# Patient Record
Sex: MALE | Race: OTHER | HISPANIC OR LATINO
[De-identification: names, ages, dates, MRNs, and addresses within clinical notes are randomized per-mention and may not be internally consistent; named-entity substitution may affect disease eponyms.]

---

## 2021-03-24 ENCOUNTER — APPOINTMENT (OUTPATIENT)
Dept: SURGERY | Facility: CLINIC | Age: 56
End: 2021-03-24
Payer: MEDICAID

## 2021-03-24 VITALS
SYSTOLIC BLOOD PRESSURE: 153 MMHG | HEART RATE: 85 BPM | HEIGHT: 71 IN | WEIGHT: 208 LBS | TEMPERATURE: 97.8 F | OXYGEN SATURATION: 97 % | DIASTOLIC BLOOD PRESSURE: 117 MMHG | BODY MASS INDEX: 29.12 KG/M2

## 2021-03-24 PROCEDURE — 99203 OFFICE O/P NEW LOW 30 MIN: CPT

## 2021-03-24 PROCEDURE — 99072 ADDL SUPL MATRL&STAF TM PHE: CPT

## 2021-03-24 RX ORDER — BICTEGRAVIR SODIUM, EMTRICITABINE, AND TENOFOVIR ALAFENAMIDE FUMARATE 30; 120; 15 MG/1; MG/1; MG/1
TABLET ORAL
Refills: 0 | Status: ACTIVE | COMMUNITY

## 2021-03-30 ENCOUNTER — OUTPATIENT (OUTPATIENT)
Dept: OUTPATIENT SERVICES | Facility: HOSPITAL | Age: 56
LOS: 1 days | Discharge: HOME | End: 2021-03-30
Payer: MEDICAID

## 2021-03-30 VITALS
RESPIRATION RATE: 18 BRPM | DIASTOLIC BLOOD PRESSURE: 100 MMHG | OXYGEN SATURATION: 96 % | HEART RATE: 81 BPM | HEIGHT: 71.5 IN | SYSTOLIC BLOOD PRESSURE: 140 MMHG | TEMPERATURE: 98 F | WEIGHT: 248.46 LBS

## 2021-03-30 DIAGNOSIS — E78.5 HYPERLIPIDEMIA, UNSPECIFIED: ICD-10-CM

## 2021-03-30 DIAGNOSIS — Z01.818 ENCOUNTER FOR OTHER PREPROCEDURAL EXAMINATION: ICD-10-CM

## 2021-03-30 DIAGNOSIS — K64.8 OTHER HEMORRHOIDS: ICD-10-CM

## 2021-03-30 DIAGNOSIS — I10 ESSENTIAL (PRIMARY) HYPERTENSION: ICD-10-CM

## 2021-03-30 DIAGNOSIS — E11.9 TYPE 2 DIABETES MELLITUS WITHOUT COMPLICATIONS: ICD-10-CM

## 2021-03-30 DIAGNOSIS — K64.9 UNSPECIFIED HEMORRHOIDS: ICD-10-CM

## 2021-03-30 LAB
ANION GAP SERPL CALC-SCNC: 5 MMOL/L — SIGNIFICANT CHANGE UP (ref 5–17)
APTT BLD: 32.5 SEC — SIGNIFICANT CHANGE UP (ref 27.5–35.5)
BASOPHILS # BLD AUTO: 0.04 K/UL — SIGNIFICANT CHANGE UP (ref 0–0.2)
BASOPHILS NFR BLD AUTO: 0.5 % — SIGNIFICANT CHANGE UP (ref 0–2)
BUN SERPL-MCNC: 15 MG/DL — SIGNIFICANT CHANGE UP (ref 7–23)
CALCIUM SERPL-MCNC: 9.5 MG/DL — SIGNIFICANT CHANGE UP (ref 8.5–10.1)
CHLORIDE SERPL-SCNC: 106 MMOL/L — SIGNIFICANT CHANGE UP (ref 96–108)
CO2 SERPL-SCNC: 30 MMOL/L — SIGNIFICANT CHANGE UP (ref 22–31)
CREAT SERPL-MCNC: 1.04 MG/DL — SIGNIFICANT CHANGE UP (ref 0.5–1.3)
EOSINOPHIL # BLD AUTO: 0.02 K/UL — SIGNIFICANT CHANGE UP (ref 0–0.5)
EOSINOPHIL NFR BLD AUTO: 0.2 % — SIGNIFICANT CHANGE UP (ref 0–6)
GLUCOSE SERPL-MCNC: 113 MG/DL — HIGH (ref 70–99)
HCT VFR BLD CALC: 47.4 % — SIGNIFICANT CHANGE UP (ref 39–50)
HGB BLD-MCNC: 15.6 G/DL — SIGNIFICANT CHANGE UP (ref 13–17)
IMM GRANULOCYTES NFR BLD AUTO: 0.5 % — SIGNIFICANT CHANGE UP (ref 0–1.5)
INR BLD: 1.05 RATIO — SIGNIFICANT CHANGE UP (ref 0.88–1.16)
LYMPHOCYTES # BLD AUTO: 3.66 K/UL — HIGH (ref 1–3.3)
LYMPHOCYTES # BLD AUTO: 44.2 % — HIGH (ref 13–44)
MCHC RBC-ENTMCNC: 29.4 PG — SIGNIFICANT CHANGE UP (ref 27–34)
MCHC RBC-ENTMCNC: 32.9 GM/DL — SIGNIFICANT CHANGE UP (ref 32–36)
MCV RBC AUTO: 89.4 FL — SIGNIFICANT CHANGE UP (ref 80–100)
MONOCYTES # BLD AUTO: 0.9 K/UL — SIGNIFICANT CHANGE UP (ref 0–0.9)
MONOCYTES NFR BLD AUTO: 10.9 % — SIGNIFICANT CHANGE UP (ref 2–14)
NEUTROPHILS # BLD AUTO: 3.62 K/UL — SIGNIFICANT CHANGE UP (ref 1.8–7.4)
NEUTROPHILS NFR BLD AUTO: 43.7 % — SIGNIFICANT CHANGE UP (ref 43–77)
NRBC # BLD: 0 /100 WBCS — SIGNIFICANT CHANGE UP (ref 0–0)
PLATELET # BLD AUTO: 255 K/UL — SIGNIFICANT CHANGE UP (ref 150–400)
POTASSIUM SERPL-MCNC: 3.9 MMOL/L — SIGNIFICANT CHANGE UP (ref 3.5–5.3)
POTASSIUM SERPL-SCNC: 3.9 MMOL/L — SIGNIFICANT CHANGE UP (ref 3.5–5.3)
PROTHROM AB SERPL-ACNC: 12.2 SEC — SIGNIFICANT CHANGE UP (ref 10.6–13.6)
RBC # BLD: 5.3 M/UL — SIGNIFICANT CHANGE UP (ref 4.2–5.8)
RBC # FLD: 13.6 % — SIGNIFICANT CHANGE UP (ref 10.3–14.5)
SODIUM SERPL-SCNC: 141 MMOL/L — SIGNIFICANT CHANGE UP (ref 135–145)
WBC # BLD: 8.28 K/UL — SIGNIFICANT CHANGE UP (ref 3.8–10.5)
WBC # FLD AUTO: 8.28 K/UL — SIGNIFICANT CHANGE UP (ref 3.8–10.5)

## 2021-03-30 PROCEDURE — 93010 ELECTROCARDIOGRAM REPORT: CPT

## 2021-03-30 NOTE — H&P PST ADULT - NSICDXPROBLEM_GEN_ALL_CORE_FT
PROBLEM DIAGNOSES  Problem: Hemorrhoids  Assessment and Plan: scheduled for hemorrhoidectomy    Problem: HTN (hypertension)  Assessment and Plan: continue meds      Problem: DM (diabetes mellitus)  Assessment and Plan: continue meds      Problem: HLD (hyperlipidemia)  Assessment and Plan: continue meds      Problem: Preoperative examination  Assessment and Plan: Preop instructions provided including NPO status. Hibiclens wash for infection control. Patient aware to stop NSAID, OTC herbals  for 7-10 days, needs to be accoumpanied by adult upon discharge.  Patient verbalized understanding  medical clearance  patient instructed on having covid19 swab 3 days prior to surgery

## 2021-03-30 NOTE — H&P PST ADULT - HISTORY OF PRESENT ILLNESS
56 yo male , PMH - htn, dm, hld, HIV c/o painful hemorrhoids - scheduled for hemorrhoidectomy    denies recent travels in the past 30 days. No fever, SOB, cough, flu like symptoms or body rash- covid screen

## 2021-03-30 NOTE — H&P PST ADULT - NSANTHOSAYNRD_GEN_A_CORE
denies/No. KACEY screening performed.  STOP BANG Legend: 0-2 = LOW Risk; 3-4 = INTERMEDIATE Risk; 5-8 = HIGH Risk

## 2021-03-30 NOTE — H&P PST ADULT - ASSESSMENT
hemorrhoid  CAPRINI SCORE    AGE RELATED RISK FACTORS                                                       MOBILITY RELATED FACTORS  [x ] Age 41-60 years                                            (1 Point)                  [ ] Bed rest                                                        (1 Point)  [ ] Age: 61-74 years                                           (2 Points)                [ ] Plaster cast                                                   (2 Points)  [ ] Age= 75 years                                              (3 Points)                 [ ] Bed bound for more than 72 hours                   (2 Points)    DISEASE RELATED RISK FACTORS                                               GENDER SPECIFIC FACTORS  [ ] Edema in the lower extremities                       (1 Point)                  [ ] Pregnancy                                                     (1 Point)  [ ] Varicose veins                                               (1 Point)                  [ ] Post-partum < 6 weeks                                   (1 Point)             [x ] BMI > 25 Kg/m2                                            (1 Point)                  [ ] Hormonal therapy  or oral contraception            (1 Point)                 [ ] Sepsis (in the previous month)                        (1 Point)                  [ ] History of pregnancy complications  [ ] Pneumonia or serious lung disease                                               [ ] Unexplained or recurrent                       (1 Point)           (in the previous month)                               (1 Point)  [ ] Abnormal pulmonary function test                     (1 Point)                 SURGERY RELATED RISK FACTORS  [ ] Acute myocardial infarction                              (1 Point)                 [ ]  Section                                            (1 Point)  [ ] Congestive heart failure (in the previous month)  (1 Point)                 [ ] Minor surgery                                                 (1 Point)   [ ] Inflammatory bowel disease                             (1 Point)                 [ ] Arthroscopic surgery                                        (2 Points)  [ ] Central venous access                                    (2 Points)                [ x] General surgery lasting more than 45 minutes   (2 Points)       [ ] Stroke (in the previous month)                          (5 Points)               [ ] Elective arthroplasty                                        (5 Points)                                                                                                                                               HEMATOLOGY RELATED FACTORS                                                 TRAUMA RELATED RISK FACTORS  [ ] Prior episodes of VTE                                     (3 Points)                 [ ] Fracture of the hip, pelvis, or leg                       (5 Points)  [ ] Positive family history for VTE                         (3 Points)                 [ ] Acute spinal cord injury (in the previous month)  (5 Points)  [ ] Prothrombin 12098 A                                      (3 Points)                 [ ] Paralysis  (less than 1 month)                          (5 Points)  [ ] Factor V Leiden                                             (3 Points)                 [ ] Multiple Trauma within 1 month                         (5 Points)  [ ] Lupus anticoagulants                                     (3 Points)                                                           [ ] Anticardiolipin antibodies                                (3 Points)                                                       [ ] High homocysteine in the blood                      (3 Points)                                             [ ] Other congenital or acquired thrombophilia       (3 Points)                                                [ ] Heparin induced thrombocytopenia                  (3 Points)                                          Total Score [    4      ]  Caprini Score 0-2: Low risk, No VTE Prophylaxis required for most patient's, encourage ambulation  Caprini Score 3-6: At Risk, Pharmacologic VTE prophylaxis is indicated for most patients ( in the absence of a contraindication)  Caprini Score Greater than or = 7: High Risk , pharmacologic VTE is indicated for most patients ( in the absence of a contraindication)    Caprini score indicates that the patient is high risk for VTE event ( score 6 or greater). Surgical patient's in this group will benefit from both pharmacologic prophylaxis and intermittent compression devices . Surgical team will determine the balance between VTE  risk and bleeding risk and other clinical considerations

## 2021-03-31 PROBLEM — I10 ESSENTIAL (PRIMARY) HYPERTENSION: Chronic | Status: ACTIVE | Noted: 2021-03-30

## 2021-03-31 PROBLEM — E78.5 HYPERLIPIDEMIA, UNSPECIFIED: Chronic | Status: ACTIVE | Noted: 2021-03-30

## 2021-04-03 ENCOUNTER — APPOINTMENT (OUTPATIENT)
Dept: DISASTER EMERGENCY | Facility: CLINIC | Age: 56
End: 2021-04-03

## 2021-04-04 LAB — SARS-COV-2 N GENE NPH QL NAA+PROBE: NOT DETECTED

## 2021-04-05 ENCOUNTER — TRANSCRIPTION ENCOUNTER (OUTPATIENT)
Age: 56
End: 2021-04-05

## 2021-04-06 ENCOUNTER — OUTPATIENT (OUTPATIENT)
Dept: OUTPATIENT SERVICES | Facility: HOSPITAL | Age: 56
LOS: 1 days | Discharge: HOME | End: 2021-04-06
Payer: MEDICAID

## 2021-04-06 ENCOUNTER — APPOINTMENT (OUTPATIENT)
Dept: SURGERY | Facility: HOSPITAL | Age: 56
End: 2021-04-06

## 2021-04-06 ENCOUNTER — RESULT REVIEW (OUTPATIENT)
Age: 56
End: 2021-04-06

## 2021-04-06 VITALS
WEIGHT: 248.46 LBS | HEIGHT: 70 IN | SYSTOLIC BLOOD PRESSURE: 157 MMHG | OXYGEN SATURATION: 99 % | HEART RATE: 87 BPM | DIASTOLIC BLOOD PRESSURE: 109 MMHG | RESPIRATION RATE: 14 BRPM | TEMPERATURE: 99 F

## 2021-04-06 VITALS
HEART RATE: 74 BPM | OXYGEN SATURATION: 99 % | RESPIRATION RATE: 18 BRPM | DIASTOLIC BLOOD PRESSURE: 90 MMHG | SYSTOLIC BLOOD PRESSURE: 145 MMHG

## 2021-04-06 LAB — GLUCOSE BLDC GLUCOMTR-MCNC: 110 MG/DL — HIGH (ref 70–99)

## 2021-04-06 PROCEDURE — 88305 TISSUE EXAM BY PATHOLOGIST: CPT | Mod: 26

## 2021-04-06 PROCEDURE — 45171 EXC RECT TUM TRANSANAL PART: CPT

## 2021-04-06 RX ORDER — SODIUM CHLORIDE 9 MG/ML
3 INJECTION INTRAMUSCULAR; INTRAVENOUS; SUBCUTANEOUS EVERY 8 HOURS
Refills: 0 | Status: DISCONTINUED | OUTPATIENT
Start: 2021-04-06 | End: 2021-04-06

## 2021-04-06 RX ORDER — SODIUM CHLORIDE 9 MG/ML
1000 INJECTION, SOLUTION INTRAVENOUS
Refills: 0 | Status: DISCONTINUED | OUTPATIENT
Start: 2021-04-06 | End: 2021-04-06

## 2021-04-06 RX ORDER — FENTANYL CITRATE 50 UG/ML
25 INJECTION INTRAVENOUS
Refills: 0 | Status: DISCONTINUED | OUTPATIENT
Start: 2021-04-06 | End: 2021-04-06

## 2021-04-06 RX ORDER — ONDANSETRON 8 MG/1
4 TABLET, FILM COATED ORAL ONCE
Refills: 0 | Status: DISCONTINUED | OUTPATIENT
Start: 2021-04-06 | End: 2021-04-06

## 2021-04-06 NOTE — ASU PATIENT PROFILE, ADULT - PMH
DM (diabetes mellitus)    HLD (hyperlipidemia)    HTN (hypertension)     Anxiety    Depressive disorder    DM (diabetes mellitus)    HIV infection    HLD (hyperlipidemia)    HTN (hypertension)

## 2021-04-06 NOTE — BRIEF OPERATIVE NOTE - NSICDXBRIEFPREOP_GEN_ALL_CORE_FT
PRE-OP DIAGNOSIS:  Hemorrhoids 06-Apr-2021 11:07:45  Dewayne Kelsey  Anal lesion 06-Apr-2021 11:08:01  Dewayne Kelsey

## 2021-04-06 NOTE — ASU DISCHARGE PLAN (ADULT/PEDIATRIC) - CALL YOUR DOCTOR IF YOU HAVE ANY OF THE FOLLOWING:
Bleeding that does not stop/Swelling that gets worse/Fever greater than (need to indicate Fahrenheit or Celsius)/Wound/Surgical Site with redness, or foul smelling discharge or pus/Nausea and vomiting that does not stop

## 2021-04-07 PROBLEM — B20 HUMAN IMMUNODEFICIENCY VIRUS [HIV] DISEASE: Chronic | Status: ACTIVE | Noted: 2021-04-06

## 2021-04-07 PROBLEM — F41.9 ANXIETY DISORDER, UNSPECIFIED: Chronic | Status: ACTIVE | Noted: 2021-04-06

## 2021-04-07 LAB — SURGICAL PATHOLOGY STUDY: SIGNIFICANT CHANGE UP

## 2021-04-19 DIAGNOSIS — E78.5 HYPERLIPIDEMIA, UNSPECIFIED: ICD-10-CM

## 2021-04-19 DIAGNOSIS — Z87.891 PERSONAL HISTORY OF NICOTINE DEPENDENCE: ICD-10-CM

## 2021-04-19 DIAGNOSIS — C44.520 SQUAMOUS CELL CARCINOMA OF ANAL SKIN: ICD-10-CM

## 2021-04-19 DIAGNOSIS — F41.9 ANXIETY DISORDER, UNSPECIFIED: ICD-10-CM

## 2021-04-19 DIAGNOSIS — E11.9 TYPE 2 DIABETES MELLITUS WITHOUT COMPLICATIONS: ICD-10-CM

## 2021-04-19 DIAGNOSIS — Z21 ASYMPTOMATIC HUMAN IMMUNODEFICIENCY VIRUS [HIV] INFECTION STATUS: ICD-10-CM

## 2021-04-19 DIAGNOSIS — Z79.84 LONG TERM (CURRENT) USE OF ORAL HYPOGLYCEMIC DRUGS: ICD-10-CM

## 2021-04-19 DIAGNOSIS — N40.0 BENIGN PROSTATIC HYPERPLASIA WITHOUT LOWER URINARY TRACT SYMPTOMS: ICD-10-CM

## 2021-04-19 DIAGNOSIS — E66.9 OBESITY, UNSPECIFIED: ICD-10-CM

## 2021-04-19 DIAGNOSIS — I10 ESSENTIAL (PRIMARY) HYPERTENSION: ICD-10-CM

## 2021-04-21 ENCOUNTER — APPOINTMENT (OUTPATIENT)
Dept: SURGERY | Facility: CLINIC | Age: 56
End: 2021-04-21
Payer: MEDICAID

## 2021-04-21 VITALS
HEIGHT: 71 IN | TEMPERATURE: 97.6 F | HEART RATE: 90 BPM | BODY MASS INDEX: 29.12 KG/M2 | OXYGEN SATURATION: 98 % | WEIGHT: 208 LBS | SYSTOLIC BLOOD PRESSURE: 146 MMHG | DIASTOLIC BLOOD PRESSURE: 100 MMHG

## 2021-04-21 PROCEDURE — 99024 POSTOP FOLLOW-UP VISIT: CPT

## 2021-05-04 ENCOUNTER — OUTPATIENT (OUTPATIENT)
Dept: OUTPATIENT SERVICES | Facility: HOSPITAL | Age: 56
LOS: 1 days | Discharge: HOME | End: 2021-05-04

## 2021-05-04 DIAGNOSIS — C18.9 MALIGNANT NEOPLASM OF COLON, UNSPECIFIED: ICD-10-CM

## 2021-05-05 ENCOUNTER — NON-APPOINTMENT (OUTPATIENT)
Age: 56
End: 2021-05-05

## 2021-05-05 ENCOUNTER — RESULT REVIEW (OUTPATIENT)
Age: 56
End: 2021-05-05

## 2021-05-05 ENCOUNTER — LABORATORY RESULT (OUTPATIENT)
Age: 56
End: 2021-05-05

## 2021-05-05 ENCOUNTER — APPOINTMENT (OUTPATIENT)
Dept: HEMATOLOGY ONCOLOGY | Facility: CLINIC | Age: 56
End: 2021-05-05
Payer: MEDICAID

## 2021-05-05 VITALS
BODY MASS INDEX: 34.41 KG/M2 | OXYGEN SATURATION: 99 % | DIASTOLIC BLOOD PRESSURE: 90 MMHG | RESPIRATION RATE: 16 BRPM | HEART RATE: 79 BPM | TEMPERATURE: 98 F | SYSTOLIC BLOOD PRESSURE: 156 MMHG | WEIGHT: 245.82 LBS | HEIGHT: 70.98 IN

## 2021-05-05 DIAGNOSIS — Z80.3 FAMILY HISTORY OF MALIGNANT NEOPLASM OF BREAST: ICD-10-CM

## 2021-05-05 DIAGNOSIS — Z80.42 FAMILY HISTORY OF MALIGNANT NEOPLASM OF PROSTATE: ICD-10-CM

## 2021-05-05 DIAGNOSIS — F12.90 CANNABIS USE, UNSPECIFIED, UNCOMPLICATED: ICD-10-CM

## 2021-05-05 LAB
ALBUMIN SERPL ELPH-MCNC: 4.7 G/DL
ALP BLD-CCNC: 69 U/L
ALT SERPL-CCNC: 25 U/L
ANION GAP SERPL CALC-SCNC: 12 MMOL/L
AST SERPL-CCNC: 20 U/L
BASOPHILS # BLD AUTO: 0.04 K/UL — SIGNIFICANT CHANGE UP (ref 0–0.2)
BASOPHILS NFR BLD AUTO: 0.5 % — SIGNIFICANT CHANGE UP (ref 0–2)
BILIRUB SERPL-MCNC: 0.3 MG/DL
BUN SERPL-MCNC: 14 MG/DL
CALCIUM SERPL-MCNC: 9.7 MG/DL
CD3 CELLS # BLD: 2985 /UL
CD3 CELLS NFR BLD: 86 %
CD3+CD4+ CELLS # BLD: 947 /UL
CD3+CD4+ CELLS NFR BLD: 27 %
CD3+CD4+ CELLS/CD3+CD8+ CLL SPEC: 0.47 RATIO
CD3+CD8+ CELLS # SPEC: 2011 /UL
CD3+CD8+ CELLS NFR BLD: 58 %
CHLORIDE SERPL-SCNC: 101 MMOL/L
CO2 SERPL-SCNC: 28 MMOL/L
CREAT SERPL-MCNC: 1.08 MG/DL
EOSINOPHIL # BLD AUTO: 0.02 K/UL — SIGNIFICANT CHANGE UP (ref 0–0.5)
EOSINOPHIL NFR BLD AUTO: 0.2 % — SIGNIFICANT CHANGE UP (ref 0–6)
GLUCOSE SERPL-MCNC: 103 MG/DL
HCT VFR BLD CALC: 46.4 % — SIGNIFICANT CHANGE UP (ref 39–50)
HGB BLD-MCNC: 15.5 G/DL — SIGNIFICANT CHANGE UP (ref 13–17)
IMM GRANULOCYTES NFR BLD AUTO: 0.4 % — SIGNIFICANT CHANGE UP (ref 0–1.5)
LYMPHOCYTES # BLD AUTO: 3.84 K/UL — HIGH (ref 1–3.3)
LYMPHOCYTES # BLD AUTO: 45.4 % — HIGH (ref 13–44)
MCHC RBC-ENTMCNC: 29.8 PG — SIGNIFICANT CHANGE UP (ref 27–34)
MCHC RBC-ENTMCNC: 33.4 G/DL — SIGNIFICANT CHANGE UP (ref 32–36)
MCV RBC AUTO: 89.1 FL — SIGNIFICANT CHANGE UP (ref 80–100)
MONOCYTES # BLD AUTO: 0.84 K/UL — SIGNIFICANT CHANGE UP (ref 0–0.9)
MONOCYTES NFR BLD AUTO: 9.9 % — SIGNIFICANT CHANGE UP (ref 2–14)
NEUTROPHILS # BLD AUTO: 3.69 K/UL — SIGNIFICANT CHANGE UP (ref 1.8–7.4)
NEUTROPHILS NFR BLD AUTO: 43.6 % — SIGNIFICANT CHANGE UP (ref 43–77)
NRBC # BLD: 0 /100 WBCS — SIGNIFICANT CHANGE UP (ref 0–0)
PLATELET # BLD AUTO: 226 K/UL — SIGNIFICANT CHANGE UP (ref 150–400)
POTASSIUM SERPL-SCNC: 4.1 MMOL/L
PROT SERPL-MCNC: 7.5 G/DL
RBC # BLD: 5.21 M/UL — SIGNIFICANT CHANGE UP (ref 4.2–5.8)
RBC # FLD: 13.7 % — SIGNIFICANT CHANGE UP (ref 10.3–14.5)
SODIUM SERPL-SCNC: 141 MMOL/L
WBC # BLD: 8.46 K/UL — SIGNIFICANT CHANGE UP (ref 3.8–10.5)
WBC # FLD AUTO: 8.46 K/UL — SIGNIFICANT CHANGE UP (ref 3.8–10.5)

## 2021-05-05 PROCEDURE — 99072 ADDL SUPL MATRL&STAF TM PHE: CPT

## 2021-05-05 PROCEDURE — 99205 OFFICE O/P NEW HI 60 MIN: CPT

## 2021-05-06 PROBLEM — Z80.3 FAMILY HISTORY OF MALIGNANT NEOPLASM OF BREAST: Status: ACTIVE | Noted: 2021-05-06

## 2021-05-06 PROBLEM — Z80.42 FAMILY HISTORY OF MALIGNANT NEOPLASM OF PROSTATE: Status: ACTIVE | Noted: 2021-05-06

## 2021-05-06 PROBLEM — F12.90 USES MARIJUANA: Status: ACTIVE | Noted: 2021-05-06

## 2021-05-06 LAB
HBV SURFACE AB SER QL: NONREACTIVE
HBV SURFACE AG SER QL: NONREACTIVE
HIV1 RNA # SERPL NAA+PROBE: NORMAL
HIV1 RNA # SERPL NAA+PROBE: NORMAL COPIES/ML
VIRAL LOAD INTERP: NORMAL
VIRAL LOAD LOG: NORMAL LG COP/ML

## 2021-05-06 RX ORDER — ALPRAZOLAM 2 MG/1
TABLET ORAL
Refills: 0 | Status: ACTIVE | COMMUNITY

## 2021-05-06 RX ORDER — ZOLPIDEM TARTRATE 10 MG/1
10 TABLET ORAL
Refills: 0 | Status: ACTIVE | COMMUNITY

## 2021-05-06 NOTE — CONSULT LETTER
[Dear  ___] : Dear  [unfilled], [Consult Letter:] : I had the pleasure of evaluating your patient, [unfilled]. [Please see my note below.] : Please see my note below. [Consult Closing:] : Thank you very much for allowing me to participate in the care of this patient.  If you have any questions, please do not hesitate to contact me. [Sincerely,] : Sincerely, [FreeTextEntry3] : Andres Smith MD, FACP \par  of Medicine \par Division of Hematology/Oncology\par [facility]\par [facility] \par [address]\[place] [address]\par Tel: [phone]\par Fax: [phone]\par \par \par

## 2021-05-06 NOTE — HISTORY OF PRESENT ILLNESS
[Disease: _____________________] : Disease: [unfilled] [T: ___] : T[unfilled] [N: ___] : N[unfilled] [M: ___] : M[unfilled] [de-identified] : In 2020 at age of 55 patient had his screening colonoscopy performed by Dr. Lawrence in June 2020 that as per patient was informed to be within normal limits.  Subsequently patient had felt a bump in the perirectal area and at the time was told by GI that this could be a hemorrhoid and was treated as such.\par His symptoms did improve and was eventually referred to Dr. Lipscomb for further evaluation and in March 2021 patient was seen by Dr. 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  Patient did not have full excision of the tumor rather a excisional biopsy.\par \par \par Of note patient has a history of prostate cancer treated with radiation in 2016 at the [facility] patient received 45 treatments at that time and did not go undergo any surgery and or chemotherapy.\par

## 2021-05-07 ENCOUNTER — NON-APPOINTMENT (OUTPATIENT)
Age: 56
End: 2021-05-07

## 2021-05-10 ENCOUNTER — APPOINTMENT (OUTPATIENT)
Dept: RADIATION ONCOLOGY | Facility: CLINIC | Age: 56
End: 2021-05-10
Payer: MEDICAID

## 2021-05-10 LAB
HBV CORE IGG+IGM SER QL: REACTIVE
HCV AB SER QL: ABNORMAL
HCV S/CO RATIO: 3.92 S/CO

## 2021-05-10 PROCEDURE — 99203 OFFICE O/P NEW LOW 30 MIN: CPT | Mod: 25,GC

## 2021-05-10 RX ORDER — OXYCODONE AND ACETAMINOPHEN 5; 325 MG/1; MG/1
5-325 TABLET ORAL
Qty: 20 | Refills: 0 | Status: DISCONTINUED | COMMUNITY
Start: 2021-04-06 | End: 2021-05-10

## 2021-05-10 NOTE — VITALS
[Maximal Pain Intensity: 0/10] : 0/10 [Least Pain Intensity: 0/10] : 0/10 [90: Able to carry normal activity; minor signs or symptoms of disease.] : 90: Able to carry normal activity; minor signs or symptoms of disease.  [8 - Distress Level] : Distress Level: 8

## 2021-05-12 ENCOUNTER — APPOINTMENT (OUTPATIENT)
Dept: CT IMAGING | Facility: CLINIC | Age: 56
End: 2021-05-12
Payer: MEDICAID

## 2021-05-12 ENCOUNTER — TRANSCRIPTION ENCOUNTER (OUTPATIENT)
Age: 56
End: 2021-05-12

## 2021-05-12 ENCOUNTER — OUTPATIENT (OUTPATIENT)
Dept: OUTPATIENT SERVICES | Facility: HOSPITAL | Age: 56
LOS: 1 days | End: 2021-05-12

## 2021-05-12 ENCOUNTER — RESULT REVIEW (OUTPATIENT)
Age: 56
End: 2021-05-12

## 2021-05-12 PROCEDURE — 74177 CT ABD & PELVIS W/CONTRAST: CPT | Mod: 26

## 2021-05-12 PROCEDURE — 71260 CT THORAX DX C+: CPT | Mod: 26

## 2021-05-15 NOTE — REASON FOR VISIT
[Spouse] : spouse [Consideration of Curative Therapy] : consideration of curative therapy for [Other: ___] : [unfilled]

## 2021-05-21 NOTE — PHYSICAL EXAM
[General Appearance - Alert] : alert [General Appearance - In No Acute Distress] : in no acute distress [Sclera] : the sclera and conjunctiva were normal [Hearing Threshold Finger Rub Not Glenn] : hearing was normal [] : no respiratory distress [Skin Color & Pigmentation] : normal skin color and pigmentation [Oriented To Time, Place, And Person] : oriented to person, place, and time [FreeTextEntry1] : 3 cm well lateralized left sided anal mass. condyloma

## 2021-05-21 NOTE — ADDENDUM
[FreeTextEntry1] : I saw and examined the patient with the resident/ACP.  In summary, Mr. CARL ROWLEY is a 55 year male with a history of prostate cancer s/p RT in 2016.  I reviewed the records, images, and repeated the key components of the physical exam.  We discussed the potential acute and chronic side effects of RT to the pelvis. He has no evidence of metastatic disease on re-staging.  We will await his MRI.  Given the previous RT it may be challenging to treat him to a definitive dose of RT.  We could potentially treat with external beam RT to a smaller volume (tumor plus a small margin) or consider endorectal brachytherapy.  We also discussed APR   We will discuss at tumor board. CHRISTOPHER\par

## 2021-05-21 NOTE — DISEASE MANAGEMENT
[Clinical] : TNM Stage: c [N/A] : Currently not applicable [FreeTextEntry4] : anal ca [TTNM] : x [NTNM] : x [MTNM] : x

## 2021-05-21 NOTE — HISTORY OF PRESENT ILLNESS
[FreeTextEntry1] : This is a 55 year old male with a history of prostate cancer treated with radiation in 2016 at the [facility] 81 gy / 45 Fractions who presented with a bump in the perirectal area in 2020 that was initially treated as a hemorrhoid , but eventually biopsied in 2021 revealing anal carcinoma. \par He had excisional biopsy 4/6 with doctor Andres Cortez.Pathology revealed invasive basaloid squamous carcinoma, Chloacoogenic carcinoma, 1 centimeter with focal surface erosion. The tumor involved the deep margin. \par pending staging CT CAP 5/12 and seeing Dr. monique 5/26\par Doctor Diego noted a plan for chemoradiation with xeloda and [facility]\par HIV + , hepatitis C weakly positive without RNA test to confirm 5/5/21, HBV core Ab +, \par \par \par Today: Notes diagnosis related anxiety. Has approx. 20 unintentional weight gain past year related to COVID. Denies GI/ symptoms.\par \par

## 2021-05-21 NOTE — REVIEW OF SYSTEMS
[Recent Change In Weight] : ~T recent weight change [Nocturia] : nocturia [Urinary Frequency] : urinary frequency [Anxiety] : anxiety [Negative] : Musculoskeletal [Fever] : no fever [Chills] : no chills [Fatigue] : no fatigue [Visual Disturbances] : no visual disturbances [Loss of Hearing] : no loss of hearing [Chest Pain] : no chest pain [Palpitations] : no palpitations [Shortness Of Breath] : no shortness of breath [Cough] : no cough [Swollen Glands] : no swollen glands [Skin Rash] : no skin rash

## 2021-06-03 ENCOUNTER — OUTPATIENT (OUTPATIENT)
Dept: OUTPATIENT SERVICES | Facility: HOSPITAL | Age: 56
LOS: 1 days | Discharge: HOME | End: 2021-06-03

## 2021-06-03 DIAGNOSIS — C18.9 MALIGNANT NEOPLASM OF COLON, UNSPECIFIED: ICD-10-CM

## 2021-06-04 ENCOUNTER — APPOINTMENT (OUTPATIENT)
Dept: HEMATOLOGY ONCOLOGY | Facility: CLINIC | Age: 56
End: 2021-06-04
Payer: MEDICAID

## 2021-06-04 VITALS
WEIGHT: 244.71 LBS | RESPIRATION RATE: 16 BRPM | SYSTOLIC BLOOD PRESSURE: 151 MMHG | HEART RATE: 100 BPM | TEMPERATURE: 98 F | OXYGEN SATURATION: 97 % | DIASTOLIC BLOOD PRESSURE: 102 MMHG | BODY MASS INDEX: 34.15 KG/M2

## 2021-06-04 VITALS — DIASTOLIC BLOOD PRESSURE: 95 MMHG | SYSTOLIC BLOOD PRESSURE: 141 MMHG

## 2021-06-04 PROCEDURE — 99215 OFFICE O/P EST HI 40 MIN: CPT

## 2021-06-04 NOTE — PHYSICAL EXAM
[Fully active, able to carry on all pre-disease performance without restriction] : Status 0 - Fully active, able to carry on all pre-disease performance without restriction [Obese] : obese [Normal] : affect appropriate [de-identified] : anicteric  [de-identified] : normal respiratory effort, no audible wheeze [de-identified] : reg rate

## 2021-06-04 NOTE — HISTORY OF PRESENT ILLNESS
[Disease: _____________________] : Disease: [unfilled] [T: ___] : T[unfilled] [N: ___] : N[unfilled] [M: ___] : M[unfilled] [AJCC Stage: ____] : AJCC Stage: [unfilled] [de-identified] : In 2020 at age of 55 patient had his screening colonoscopy performed by Dr. Lawrence in June 2020 that as per patient was informed to be within normal limits.  Subsequently patient had felt a bump in the perirectal area and at the time was told by GI that this could be a hemorrhoid and was treated as such.\par His symptoms did improve and was eventually referred to Dr. Lipscomb for further evaluation and in March 2021 patient was seen by Dr. 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  Patient did not have full excision of the tumor rather a excisional biopsy.\par \par \par Of note patient has a history of prostate cancer treated with radiation in 2016 at the [facility] patient received 45 treatments at that time and did not go undergo any surgery and or chemotherapy.\par  [de-identified] : Yoon comes with this wife for follow up and has no new complaints and is anxious about next steps\par no rectal pain

## 2021-06-07 ENCOUNTER — NON-APPOINTMENT (OUTPATIENT)
Age: 56
End: 2021-06-07

## 2021-06-10 ENCOUNTER — OUTPATIENT (OUTPATIENT)
Dept: OUTPATIENT SERVICES | Facility: HOSPITAL | Age: 56
LOS: 1 days | End: 2021-06-10

## 2021-06-10 ENCOUNTER — APPOINTMENT (OUTPATIENT)
Dept: MRI IMAGING | Facility: CLINIC | Age: 56
End: 2021-06-10
Payer: MEDICAID

## 2021-06-10 PROCEDURE — 72197 MRI PELVIS W/O & W/DYE: CPT | Mod: 26

## 2021-06-18 ENCOUNTER — OUTPATIENT (OUTPATIENT)
Dept: OUTPATIENT SERVICES | Facility: HOSPITAL | Age: 56
LOS: 1 days | Discharge: HOME | End: 2021-06-18
Payer: MEDICAID

## 2021-06-18 PROCEDURE — 77263 THER RADIOLOGY TX PLNG CPLX: CPT

## 2021-06-28 ENCOUNTER — NON-APPOINTMENT (OUTPATIENT)
Age: 56
End: 2021-06-28

## 2021-06-28 PROCEDURE — 77334 RADIATION TREATMENT AID(S): CPT | Mod: 26

## 2021-07-20 PROCEDURE — 77300 RADIATION THERAPY DOSE PLAN: CPT | Mod: 26

## 2021-07-20 PROCEDURE — 77338 DESIGN MLC DEVICE FOR IMRT: CPT | Mod: 26

## 2021-07-20 PROCEDURE — 77301 RADIOTHERAPY DOSE PLAN IMRT: CPT | Mod: 26

## 2021-07-27 ENCOUNTER — NON-APPOINTMENT (OUTPATIENT)
Age: 56
End: 2021-07-27

## 2021-07-27 ENCOUNTER — APPOINTMENT (OUTPATIENT)
Dept: HEMATOLOGY ONCOLOGY | Facility: CLINIC | Age: 56
End: 2021-07-27

## 2021-07-27 ENCOUNTER — RESULT REVIEW (OUTPATIENT)
Age: 56
End: 2021-07-27

## 2021-07-27 ENCOUNTER — OUTPATIENT (OUTPATIENT)
Dept: OUTPATIENT SERVICES | Facility: HOSPITAL | Age: 56
LOS: 1 days | Discharge: HOME | End: 2021-07-27

## 2021-07-27 DIAGNOSIS — C18.9 MALIGNANT NEOPLASM OF COLON, UNSPECIFIED: ICD-10-CM

## 2021-07-27 LAB
ALBUMIN SERPL ELPH-MCNC: 5 G/DL
ALP BLD-CCNC: 82 U/L
ALT SERPL-CCNC: 21 U/L
ANION GAP SERPL CALC-SCNC: 11 MMOL/L
AST SERPL-CCNC: 20 U/L
BASOPHILS # BLD AUTO: 0.04 K/UL — SIGNIFICANT CHANGE UP (ref 0–0.2)
BASOPHILS NFR BLD AUTO: 0.6 % — SIGNIFICANT CHANGE UP (ref 0–2)
BILIRUB SERPL-MCNC: 0.2 MG/DL
BUN SERPL-MCNC: 14 MG/DL
CALCIUM SERPL-MCNC: 10.5 MG/DL
CHLORIDE SERPL-SCNC: 100 MMOL/L
CO2 SERPL-SCNC: 30 MMOL/L
CREAT SERPL-MCNC: 0.99 MG/DL
EOSINOPHIL # BLD AUTO: 0.03 K/UL — SIGNIFICANT CHANGE UP (ref 0–0.5)
EOSINOPHIL NFR BLD AUTO: 0.4 % — SIGNIFICANT CHANGE UP (ref 0–6)
GLUCOSE SERPL-MCNC: 111 MG/DL
HCT VFR BLD CALC: 45.5 % — SIGNIFICANT CHANGE UP (ref 39–50)
HGB BLD-MCNC: 15.2 G/DL — SIGNIFICANT CHANGE UP (ref 13–17)
IMM GRANULOCYTES NFR BLD AUTO: 0.3 % — SIGNIFICANT CHANGE UP (ref 0–1.5)
LYMPHOCYTES # BLD AUTO: 2.94 K/UL — SIGNIFICANT CHANGE UP (ref 1–3.3)
LYMPHOCYTES # BLD AUTO: 41.1 % — SIGNIFICANT CHANGE UP (ref 13–44)
MCHC RBC-ENTMCNC: 29.4 PG — SIGNIFICANT CHANGE UP (ref 27–34)
MCHC RBC-ENTMCNC: 33.4 G/DL — SIGNIFICANT CHANGE UP (ref 32–36)
MCV RBC AUTO: 88 FL — SIGNIFICANT CHANGE UP (ref 80–100)
MONOCYTES # BLD AUTO: 0.64 K/UL — SIGNIFICANT CHANGE UP (ref 0–0.9)
MONOCYTES NFR BLD AUTO: 9 % — SIGNIFICANT CHANGE UP (ref 2–14)
NEUTROPHILS # BLD AUTO: 3.48 K/UL — SIGNIFICANT CHANGE UP (ref 1.8–7.4)
NEUTROPHILS NFR BLD AUTO: 48.6 % — SIGNIFICANT CHANGE UP (ref 43–77)
NRBC # BLD: 0 /100 WBCS — SIGNIFICANT CHANGE UP (ref 0–0)
PLATELET # BLD AUTO: 275 K/UL — SIGNIFICANT CHANGE UP (ref 150–400)
POTASSIUM SERPL-SCNC: 4.6 MMOL/L
PROT SERPL-MCNC: 7.5 G/DL
RBC # BLD: 5.17 M/UL — SIGNIFICANT CHANGE UP (ref 4.2–5.8)
RBC # FLD: 13 % — SIGNIFICANT CHANGE UP (ref 10.3–14.5)
SODIUM SERPL-SCNC: 140 MMOL/L
WBC # BLD: 7.15 K/UL — SIGNIFICANT CHANGE UP (ref 3.8–10.5)
WBC # FLD AUTO: 7.15 K/UL — SIGNIFICANT CHANGE UP (ref 3.8–10.5)

## 2021-07-27 PROCEDURE — 77014: CPT | Mod: 26

## 2021-07-28 ENCOUNTER — RESULT REVIEW (OUTPATIENT)
Age: 56
End: 2021-07-28

## 2021-07-28 ENCOUNTER — APPOINTMENT (OUTPATIENT)
Dept: INFUSION THERAPY | Facility: HOSPITAL | Age: 56
End: 2021-07-28

## 2021-07-28 LAB
BASOPHILS # BLD AUTO: 0.04 K/UL — SIGNIFICANT CHANGE UP (ref 0–0.2)
BASOPHILS NFR BLD AUTO: 0.6 % — SIGNIFICANT CHANGE UP (ref 0–2)
EOSINOPHIL # BLD AUTO: 0.02 K/UL — SIGNIFICANT CHANGE UP (ref 0–0.5)
EOSINOPHIL NFR BLD AUTO: 0.3 % — SIGNIFICANT CHANGE UP (ref 0–6)
HCT VFR BLD CALC: 44.9 % — SIGNIFICANT CHANGE UP (ref 39–50)
HGB BLD-MCNC: 15.7 G/DL — SIGNIFICANT CHANGE UP (ref 13–17)
IMM GRANULOCYTES NFR BLD AUTO: 0.6 % — SIGNIFICANT CHANGE UP (ref 0–1.5)
LYMPHOCYTES # BLD AUTO: 2.37 K/UL — SIGNIFICANT CHANGE UP (ref 1–3.3)
LYMPHOCYTES # BLD AUTO: 35.6 % — SIGNIFICANT CHANGE UP (ref 13–44)
MCHC RBC-ENTMCNC: 30 PG — SIGNIFICANT CHANGE UP (ref 27–34)
MCHC RBC-ENTMCNC: 35 G/DL — SIGNIFICANT CHANGE UP (ref 32–36)
MCV RBC AUTO: 85.9 FL — SIGNIFICANT CHANGE UP (ref 80–100)
MONOCYTES # BLD AUTO: 0.74 K/UL — SIGNIFICANT CHANGE UP (ref 0–0.9)
MONOCYTES NFR BLD AUTO: 11.1 % — SIGNIFICANT CHANGE UP (ref 2–14)
NEUTROPHILS # BLD AUTO: 3.45 K/UL — SIGNIFICANT CHANGE UP (ref 1.8–7.4)
NEUTROPHILS NFR BLD AUTO: 51.8 % — SIGNIFICANT CHANGE UP (ref 43–77)
NRBC # BLD: 0 /100 WBCS — SIGNIFICANT CHANGE UP (ref 0–0)
PLATELET # BLD AUTO: 262 K/UL — SIGNIFICANT CHANGE UP (ref 150–400)
RBC # BLD: 5.23 M/UL — SIGNIFICANT CHANGE UP (ref 4.2–5.8)
RBC # FLD: 12.8 % — SIGNIFICANT CHANGE UP (ref 10.3–14.5)
WBC # BLD: 6.66 K/UL — SIGNIFICANT CHANGE UP (ref 3.8–10.5)
WBC # FLD AUTO: 6.66 K/UL — SIGNIFICANT CHANGE UP (ref 3.8–10.5)

## 2021-07-28 PROCEDURE — 77014: CPT | Mod: 26

## 2021-07-29 ENCOUNTER — NON-APPOINTMENT (OUTPATIENT)
Age: 56
End: 2021-07-29

## 2021-07-29 DIAGNOSIS — R11.2 NAUSEA WITH VOMITING, UNSPECIFIED: ICD-10-CM

## 2021-07-29 DIAGNOSIS — Z51.11 ENCOUNTER FOR ANTINEOPLASTIC CHEMOTHERAPY: ICD-10-CM

## 2021-07-29 PROCEDURE — 77014: CPT | Mod: 26

## 2021-07-30 PROCEDURE — 77014: CPT | Mod: 26

## 2021-08-02 VITALS
HEART RATE: 99 BPM | RESPIRATION RATE: 17 BRPM | BODY MASS INDEX: 32.53 KG/M2 | SYSTOLIC BLOOD PRESSURE: 141 MMHG | TEMPERATURE: 94.4 F | DIASTOLIC BLOOD PRESSURE: 104 MMHG | OXYGEN SATURATION: 99 % | WEIGHT: 233.12 LBS

## 2021-08-02 PROCEDURE — 77014: CPT | Mod: 26

## 2021-08-02 PROCEDURE — 77427 RADIATION TX MANAGEMENT X5: CPT

## 2021-08-02 NOTE — REVIEW OF SYSTEMS
[Anal Pain: Grade 1 - Mild pain] : Anal Pain: Grade 1 - Mild pain [Constipation: Grade 1 - Occasional or intermittent symptoms; occasional use of stool softeners, laxatives, dietary modification, or enema] : Constipation: Grade 1 - Occasional or intermittent symptoms; occasional use of stool softeners, laxatives, dietary modification, or enema [Diarrhea: Grade 0] : Diarrhea: Grade 0 [Nausea: Grade 0] : Nausea: Grade 0 [Rectal Pain: Grade 1 - Mild pain] : Rectal Pain: Grade 1 - Mild pain [Vomiting: Grade 0] : Vomiting: Grade 0 [Fatigue: Grade 1 - Fatigue relieved by rest] : Fatigue: Grade 1 - Fatigue relieved by rest [Hematuria: Grade 0] : Hematuria: Grade 0 [Urinary Incontinence: Grade 0] : Urinary Incontinence: Grade 0  [Urinary Retention: Grade 0] : Urinary Retention: Grade 0 [Urinary Tract Pain: Grade 0] : Urinary Tract Pain: Grade 0 [Urinary Urgency: Grade 0] : Urinary Urgency: Grade 0 [Urinary Frequency: Grade 1 - Present] : Urinary Frequency: Grade 1 - Present [Skin Hyperpigmentation: Grade 0] : Skin Hyperpigmentation: Grade 0 [Dermatitis Radiation: Grade 0] : Dermatitis Radiation: Grade 0

## 2021-08-03 PROCEDURE — 77014: CPT | Mod: 26

## 2021-08-04 PROCEDURE — 77014: CPT | Mod: 26

## 2021-08-05 PROCEDURE — 77014: CPT | Mod: 26

## 2021-08-06 ENCOUNTER — RESULT REVIEW (OUTPATIENT)
Age: 56
End: 2021-08-06

## 2021-08-06 ENCOUNTER — APPOINTMENT (OUTPATIENT)
Dept: HEMATOLOGY ONCOLOGY | Facility: CLINIC | Age: 56
End: 2021-08-06
Payer: MEDICAID

## 2021-08-06 VITALS
DIASTOLIC BLOOD PRESSURE: 88 MMHG | RESPIRATION RATE: 16 BRPM | BODY MASS INDEX: 32.61 KG/M2 | WEIGHT: 233.69 LBS | OXYGEN SATURATION: 99 % | SYSTOLIC BLOOD PRESSURE: 136 MMHG | TEMPERATURE: 98 F | HEART RATE: 109 BPM

## 2021-08-06 LAB
BASOPHILS # BLD AUTO: 0 K/UL — SIGNIFICANT CHANGE UP (ref 0–0.2)
BASOPHILS NFR BLD AUTO: 0 % — SIGNIFICANT CHANGE UP (ref 0–2)
EOSINOPHIL # BLD AUTO: 0 K/UL — SIGNIFICANT CHANGE UP (ref 0–0.5)
EOSINOPHIL NFR BLD AUTO: 0 % — SIGNIFICANT CHANGE UP (ref 0–6)
HCT VFR BLD CALC: 42.9 % — SIGNIFICANT CHANGE UP (ref 39–50)
HGB BLD-MCNC: 14.5 G/DL — SIGNIFICANT CHANGE UP (ref 13–17)
LYMPHOCYTES # BLD AUTO: 0.91 K/UL — LOW (ref 1–3.3)
LYMPHOCYTES # BLD AUTO: 31 % — SIGNIFICANT CHANGE UP (ref 13–44)
MCHC RBC-ENTMCNC: 29.7 PG — SIGNIFICANT CHANGE UP (ref 27–34)
MCHC RBC-ENTMCNC: 33.8 G/DL — SIGNIFICANT CHANGE UP (ref 32–36)
MCV RBC AUTO: 87.9 FL — SIGNIFICANT CHANGE UP (ref 80–100)
MONOCYTES # BLD AUTO: 0.35 K/UL — SIGNIFICANT CHANGE UP (ref 0–0.9)
MONOCYTES NFR BLD AUTO: 12 % — SIGNIFICANT CHANGE UP (ref 2–14)
NEUTROPHILS # BLD AUTO: 1.66 K/UL — LOW (ref 1.8–7.4)
NEUTROPHILS NFR BLD AUTO: 57 % — SIGNIFICANT CHANGE UP (ref 43–77)
NRBC # BLD: 0 /100 — SIGNIFICANT CHANGE UP (ref 0–0)
NRBC # BLD: SIGNIFICANT CHANGE UP /100 WBCS (ref 0–0)
PLAT MORPH BLD: NORMAL — SIGNIFICANT CHANGE UP
PLATELET # BLD AUTO: 198 K/UL — SIGNIFICANT CHANGE UP (ref 150–400)
RBC # BLD: 4.88 M/UL — SIGNIFICANT CHANGE UP (ref 4.2–5.8)
RBC # FLD: 12.8 % — SIGNIFICANT CHANGE UP (ref 10.3–14.5)
RBC BLD AUTO: SIGNIFICANT CHANGE UP
WBC # BLD: 2.92 K/UL — LOW (ref 3.8–10.5)
WBC # FLD AUTO: 2.92 K/UL — LOW (ref 3.8–10.5)

## 2021-08-06 PROCEDURE — 77014: CPT | Mod: 26

## 2021-08-06 PROCEDURE — 99214 OFFICE O/P EST MOD 30 MIN: CPT

## 2021-08-09 LAB
ALBUMIN SERPL ELPH-MCNC: 4.5 G/DL
ALP BLD-CCNC: 73 U/L
ALT SERPL-CCNC: 26 U/L
ANION GAP SERPL CALC-SCNC: 12 MMOL/L
AST SERPL-CCNC: 21 U/L
BILIRUB SERPL-MCNC: 0.2 MG/DL
BUN SERPL-MCNC: 15 MG/DL
CALCIUM SERPL-MCNC: 9.4 MG/DL
CHLORIDE SERPL-SCNC: 103 MMOL/L
CO2 SERPL-SCNC: 26 MMOL/L
CREAT SERPL-MCNC: 0.78 MG/DL
GLUCOSE SERPL-MCNC: 108 MG/DL
POTASSIUM SERPL-SCNC: 4.2 MMOL/L
PROT SERPL-MCNC: 6.9 G/DL
SODIUM SERPL-SCNC: 140 MMOL/L

## 2021-08-09 PROCEDURE — 77014: CPT | Mod: 26

## 2021-08-09 NOTE — HISTORY OF PRESENT ILLNESS
[Disease: _____________________] : Disease: [unfilled] [T: ___] : T[unfilled] [N: ___] : N[unfilled] [M: ___] : M[unfilled] [AJCC Stage: ____] : AJCC Stage: [unfilled] [de-identified] : In 2020 at age of 55 patient had his screening colonoscopy performed by Dr. Lawrence in June 2020 that as per patient was informed to be within normal limits.  Subsequently patient had felt a bump in the perirectal area and at the time was told by GI that this could be a hemorrhoid and was treated as such.\par His symptoms did improve and was eventually referred to Dr. Avilez for further evaluation and in March 2021 patient was seen by Dr. 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  Patient did not have full excision of the tumor rather a excisional biopsy.\par \par \par Of note patient has a history of prostate cancer treated with radiation in 2016 at the [facility] patient received 45 treatments at that time and did not go undergo any surgery and or chemotherapy.\par  [de-identified] : Invasive basaloid squamous carcinoma [de-identified] : Patient started radiation therapy with Dr. Bolaños on 7/27/21 with a modified schedule due to previous radiation treatment to prostate.  Radiation treatments will continue ountil 8/16/21.  Patient started Xeloda and received Mitomycin on 7/28/21.  Given the modified XRT plan, patient will not receive the second dose of Gerry and will continue on the Xeloda for 2-3 weeks after completing the XRT.  Patient reports today that overall he feels well.  He admits to some mild kandy-anal burning discomfort that started this week.  Patient has his next appointment with Dr. Ley on 8/10 but is scheduled for XRT later today so it was encouraged that he let the staff at XRT know today about the discomfort he is feeling and let them recommend treatment.  Patient denies significant fatigue, abdominal pain, change in appetite, diarrhea, constipation, rectal bleeding.  Patient denies any skin changes or pain to hands or feet.  Patient has not started using Udderly Smooth cream yet and was encouraged to start today as a preventative measure.

## 2021-08-09 NOTE — PHYSICAL EXAM
[Fully active, able to carry on all pre-disease performance without restriction] : Status 0 - Fully active, able to carry on all pre-disease performance without restriction [Obese] : obese [Normal] : affect appropriate [de-identified] : anicteric  [de-identified] : normal respiratory effort, no audible wheeze [de-identified] : reg rate

## 2021-08-10 ENCOUNTER — NON-APPOINTMENT (OUTPATIENT)
Age: 56
End: 2021-08-10

## 2021-08-10 VITALS
TEMPERATURE: 97 F | SYSTOLIC BLOOD PRESSURE: 148 MMHG | OXYGEN SATURATION: 98 % | DIASTOLIC BLOOD PRESSURE: 98 MMHG | RESPIRATION RATE: 16 BRPM | HEART RATE: 99 BPM

## 2021-08-10 VITALS — BODY MASS INDEX: 32.41 KG/M2 | WEIGHT: 232.25 LBS

## 2021-08-10 PROCEDURE — 77014: CPT | Mod: 26

## 2021-08-10 PROCEDURE — 77427 RADIATION TX MANAGEMENT X5: CPT

## 2021-08-11 PROCEDURE — 77014: CPT | Mod: 26

## 2021-08-12 PROCEDURE — 77014: CPT | Mod: 26

## 2021-08-13 PROCEDURE — 77014: CPT | Mod: 26

## 2021-08-16 ENCOUNTER — NON-APPOINTMENT (OUTPATIENT)
Age: 56
End: 2021-08-16

## 2021-08-16 VITALS
SYSTOLIC BLOOD PRESSURE: 138 MMHG | OXYGEN SATURATION: 100 % | DIASTOLIC BLOOD PRESSURE: 96 MMHG | WEIGHT: 105.5 LBS | RESPIRATION RATE: 17 BRPM | BODY MASS INDEX: 32.22 KG/M2 | HEART RATE: 111 BPM

## 2021-08-16 PROCEDURE — 77014: CPT | Mod: 26

## 2021-08-16 PROCEDURE — 77427 RADIATION TX MANAGEMENT X5: CPT

## 2021-08-16 NOTE — REVIEW OF SYSTEMS
[Anal Pain: Grade 1 - Mild pain] : Anal Pain: Grade 1 - Mild pain [Constipation: Grade 1 - Occasional or intermittent symptoms; occasional use of stool softeners, laxatives, dietary modification, or enema] : Constipation: Grade 1 - Occasional or intermittent symptoms; occasional use of stool softeners, laxatives, dietary modification, or enema [Diarrhea: Grade 0] : Diarrhea: Grade 0 [Nausea: Grade 0] : Nausea: Grade 0 [Rectal Pain: Grade 1 - Mild pain] : Rectal Pain: Grade 1 - Mild pain [Vomiting: Grade 0] : Vomiting: Grade 0 [Fatigue: Grade 1 - Fatigue relieved by rest] : Fatigue: Grade 1 - Fatigue relieved by rest [Hematuria: Grade 0] : Hematuria: Grade 0 [Urinary Tract Pain: Grade 0] : Urinary Tract Pain: Grade 0 [Urinary Frequency: Grade 1 - Present] : Urinary Frequency: Grade 1 - Present [Skin Hyperpigmentation: Grade 0] : Skin Hyperpigmentation: Grade 0 [Dermatitis Radiation: Grade 0] : Dermatitis Radiation: Grade 0

## 2021-08-23 ENCOUNTER — APPOINTMENT (OUTPATIENT)
Dept: RADIATION ONCOLOGY | Facility: CLINIC | Age: 56
End: 2021-08-23
Payer: MEDICAID

## 2021-08-23 ENCOUNTER — NON-APPOINTMENT (OUTPATIENT)
Age: 56
End: 2021-08-23

## 2021-08-23 VITALS
HEART RATE: 112 BPM | OXYGEN SATURATION: 100 % | RESPIRATION RATE: 16 BRPM | WEIGHT: 225.2 LBS | HEIGHT: 70.98 IN | SYSTOLIC BLOOD PRESSURE: 123 MMHG | DIASTOLIC BLOOD PRESSURE: 90 MMHG | TEMPERATURE: 97.2 F | BODY MASS INDEX: 31.53 KG/M2

## 2021-08-23 PROCEDURE — 99024 POSTOP FOLLOW-UP VISIT: CPT | Mod: GC

## 2021-08-23 NOTE — HISTORY OF PRESENT ILLNESS
[FreeTextEntry1] : This is a 55 year old male with a history of prostate cancer treated with radiation in 2016 at the [facility] 81 gy / 45 Fractions who presented with a bump in the perirectal area in 2020 that was initially treated as a hemorrhoid , but eventually biopsied in 2021 revealing anal carcinoma. \par He had excisional biopsy 4/6 with doctor Andres Cortez.Pathology revealed invasive basaloid squamous carcinoma, Chloacogenic carcinoma, 1 centimeter with focal surface erosion. The tumor involved the deep margin. \par pending staging CT [facility] 5/12 and seeing Dr. monique 5/26\par \par 8/16/21: 15/15 fx  fatigue, rectal/anal pain 4-7/10 partially relieved with extra-strength tylenol, worsening urinary frequency, constipation relieved with colace. Appetite good, wt. stable. Plan to continue on capecitabine 2-3 wks post RT per med/onc.\par \par 8/10/21: Patient presents today for on treatment visit. He has completed 11/15 fx. Today he reports mild discomfort to his anus and has constipation.\par \par 8/2/21 Presents today for OTV completed 5/15 fx.

## 2021-08-23 NOTE — VITALS
[Least Pain Intensity: 0/10] : 0/10 [Pain Description/Quality: ___] : Pain description/quality: [unfilled] [Pain Duration: ___] : Pain duration: [unfilled] [Pain Location: ___] : Pain Location: [unfilled] [OTC] : OTC [90: Able to carry normal activity; minor signs or symptoms of disease.] : 90: Able to carry normal activity; minor signs or symptoms of disease.  [ECOG Performance Status: 1 - Restricted in physically strenuous activity but ambulatory and able to carry out work of a light or sedentary nature] : Performance Status: 1 - Restricted in physically strenuous activity but ambulatory and able to carry out work of a light or sedentary nature, e.g., light house work, office work [Maximal Pain Intensity: 4/10] : 4/10 [Pain Interferes with ADLs] : Pain does not interfere with activities of daily living

## 2021-08-23 NOTE — DISEASE MANAGEMENT
[Clinical] : TNM Stage: c [N/A] : Currently not applicable [FreeTextEntry4] : anal ca [TTNM] : x [NTNM] : x [de-identified] : Pelvis/Anus [MTNM] : x

## 2021-08-23 NOTE — PHYSICAL EXAM
[General Appearance - Alert] : alert [General Appearance - In No Acute Distress] : in no acute distress [de-identified] : mild erythema perianal area

## 2021-08-25 ENCOUNTER — APPOINTMENT (OUTPATIENT)
Dept: INFUSION THERAPY | Facility: HOSPITAL | Age: 56
End: 2021-08-25

## 2021-08-25 ENCOUNTER — NON-APPOINTMENT (OUTPATIENT)
Age: 56
End: 2021-08-25

## 2021-08-26 ENCOUNTER — OUTPATIENT (OUTPATIENT)
Dept: OUTPATIENT SERVICES | Facility: HOSPITAL | Age: 56
LOS: 1 days | Discharge: HOME | End: 2021-08-26

## 2021-08-26 DIAGNOSIS — C18.9 MALIGNANT NEOPLASM OF COLON, UNSPECIFIED: ICD-10-CM

## 2021-08-27 ENCOUNTER — RESULT REVIEW (OUTPATIENT)
Age: 56
End: 2021-08-27

## 2021-08-27 ENCOUNTER — APPOINTMENT (OUTPATIENT)
Dept: HEMATOLOGY ONCOLOGY | Facility: CLINIC | Age: 56
End: 2021-08-27
Payer: MEDICAID

## 2021-08-27 VITALS
SYSTOLIC BLOOD PRESSURE: 137 MMHG | DIASTOLIC BLOOD PRESSURE: 91 MMHG | TEMPERATURE: 98 F | RESPIRATION RATE: 16 BRPM | OXYGEN SATURATION: 98 % | HEART RATE: 108 BPM | BODY MASS INDEX: 31.69 KG/M2 | WEIGHT: 227.08 LBS

## 2021-08-27 DIAGNOSIS — G89.18 OTHER ACUTE POSTPROCEDURAL PAIN: ICD-10-CM

## 2021-08-27 LAB
ALBUMIN SERPL ELPH-MCNC: 4.3 G/DL
ALP BLD-CCNC: 72 U/L
ALT SERPL-CCNC: 48 U/L
ANION GAP SERPL CALC-SCNC: 9 MMOL/L
AST SERPL-CCNC: 24 U/L
BASOPHILS # BLD AUTO: 0.02 K/UL — SIGNIFICANT CHANGE UP (ref 0–0.2)
BASOPHILS NFR BLD AUTO: 0.3 % — SIGNIFICANT CHANGE UP (ref 0–2)
BILIRUB SERPL-MCNC: 0.2 MG/DL
BUN SERPL-MCNC: 18 MG/DL
CALCIUM SERPL-MCNC: 9.9 MG/DL
CHLORIDE SERPL-SCNC: 100 MMOL/L
CO2 SERPL-SCNC: 30 MMOL/L
CREAT SERPL-MCNC: 1 MG/DL
EOSINOPHIL # BLD AUTO: 0.02 K/UL — SIGNIFICANT CHANGE UP (ref 0–0.5)
EOSINOPHIL NFR BLD AUTO: 0.3 % — SIGNIFICANT CHANGE UP (ref 0–6)
GLUCOSE SERPL-MCNC: 130 MG/DL
HCT VFR BLD CALC: 40.8 % — SIGNIFICANT CHANGE UP (ref 39–50)
HGB BLD-MCNC: 14 G/DL — SIGNIFICANT CHANGE UP (ref 13–17)
IMM GRANULOCYTES NFR BLD AUTO: 0.9 % — SIGNIFICANT CHANGE UP (ref 0–1.5)
LYMPHOCYTES # BLD AUTO: 0.52 K/UL — LOW (ref 1–3.3)
LYMPHOCYTES # BLD AUTO: 8.2 % — LOW (ref 13–44)
MCHC RBC-ENTMCNC: 30.5 PG — SIGNIFICANT CHANGE UP (ref 27–34)
MCHC RBC-ENTMCNC: 34.3 G/DL — SIGNIFICANT CHANGE UP (ref 32–36)
MCV RBC AUTO: 88.9 FL — SIGNIFICANT CHANGE UP (ref 80–100)
MONOCYTES # BLD AUTO: 0.83 K/UL — SIGNIFICANT CHANGE UP (ref 0–0.9)
MONOCYTES NFR BLD AUTO: 13.1 % — SIGNIFICANT CHANGE UP (ref 2–14)
NEUTROPHILS # BLD AUTO: 4.9 K/UL — SIGNIFICANT CHANGE UP (ref 1.8–7.4)
NEUTROPHILS NFR BLD AUTO: 77.2 % — HIGH (ref 43–77)
NRBC # BLD: 0 /100 WBCS — SIGNIFICANT CHANGE UP (ref 0–0)
PLATELET # BLD AUTO: 190 K/UL — SIGNIFICANT CHANGE UP (ref 150–400)
POTASSIUM SERPL-SCNC: 4.4 MMOL/L
PROT SERPL-MCNC: 6.7 G/DL
RBC # BLD: 4.59 M/UL — SIGNIFICANT CHANGE UP (ref 4.2–5.8)
RBC # FLD: 14.3 % — SIGNIFICANT CHANGE UP (ref 10.3–14.5)
SODIUM SERPL-SCNC: 140 MMOL/L
WBC # BLD: 6.35 K/UL — SIGNIFICANT CHANGE UP (ref 3.8–10.5)
WBC # FLD AUTO: 6.35 K/UL — SIGNIFICANT CHANGE UP (ref 3.8–10.5)

## 2021-08-27 PROCEDURE — 99215 OFFICE O/P EST HI 40 MIN: CPT

## 2021-08-28 NOTE — ASSESSMENT
[Work-up necessary to assess local, regional or metastatic recurrence] : Work-up necessary to assess local, regional or metastatic recurrence [FreeTextEntry1] : PTE

## 2021-08-28 NOTE — HISTORY OF PRESENT ILLNESS
[FreeTextEntry1] : This is a 56 year old male with a history of prostate cancer treated with radiation in 2016 at the [facility] 81 gy / 45 Fractions who presented with a bump in the perirectal area in 2020 that was initially treated as a hemorrhoid , but eventually biopsied in 2021 revealing anal carcinoma. \par He had excisional biopsy 4/6 with doctor Andres Cortez.Pathology revealed invasive basaloid squamous carcinoma, Chloacogenic carcinoma, 1 centimeter with focal surface erosion. The tumor involved the deep margin. \par pending staging CT [facility] 5/12 and seeing Dr. monique 5/26\par \par Completed RT 8/16/21 to pelvis/anus with 5400 cGy.\par \par 8/19/21: Patient report 10/10 constant anal pain and pressure with skin peeling to beau BARGER. Rx for Oxycodone and silvadene given. Advised to take nsaids as well. F/u scheduled for 8/23/21\par \par Today: Patient still in pain.  Continue above and gave a steroid suppository, occasional bleeding.  Gave Rx for more pain meds, suggested he take nsaids with meals.  Will see back in a few weeks.  Dr. Cortez will see him this week.\par \par \par \par \par

## 2021-08-28 NOTE — PHYSICAL EXAM
[FreeTextEntry1] : He has erythema and mosit desquamation in the perianal area, slin in groin is healing well, no breakdown.  No other abnormalities seen.

## 2021-08-28 NOTE — VITALS
[Maximal Pain Intensity: 7/10] : 7/10 [Least Pain Intensity: 3/10] : 3/10 [OTC] : OTC [Opioid] : opioid [NSAID/Non-Opioid] : NSAID/Non-Opioid [80: Normal activity with effort; some signs or symptoms of disease.] : 80: Normal activity with effort; some signs or symptoms of disease.  [ECOG Performance Status: 1 - Restricted in physically strenuous activity but ambulatory and able to carry out work of a light or sedentary nature] : Performance Status: 1 - Restricted in physically strenuous activity but ambulatory and able to carry out work of a light or sedentary nature, e.g., light house work, office work

## 2021-08-31 PROBLEM — G89.18 POST-OP PAIN: Status: RESOLVED | Noted: 2021-04-06 | Resolved: 2021-08-31

## 2021-08-31 RX ORDER — OXYCODONE HYDROCHLORIDE 10 MG/1
10 TABLET, FILM COATED, EXTENDED RELEASE ORAL
Qty: 30 | Refills: 0 | Status: DISCONTINUED | COMMUNITY
Start: 2021-08-27 | End: 2021-08-31

## 2021-08-31 NOTE — HISTORY OF PRESENT ILLNESS
[Disease: _____________________] : Disease: [unfilled] [T: ___] : T[unfilled] [N: ___] : N[unfilled] [M: ___] : M[unfilled] [AJCC Stage: ____] : AJCC Stage: [unfilled] [Treatment Protocol] : Treatment Protocol [de-identified] : In 2020 at age of 55 patient had his screening colonoscopy performed by Dr. Lawrence in June 2020 that as per patient was informed to be within normal limits.  Subsequently patient had felt a bump in the perirectal area and at the time was told by GI that this could be a hemorrhoid and was treated as such.\par His symptoms did improve and was eventually referred to Dr. Avilez for further evaluation and in March 2021 patient was seen by Dr. 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  Patient did not have full excision of the tumor rather a excisional biopsy.\par \par \par Of note patient has a history of prostate cancer treated with radiation in 2016 at the [facility] patient received 45 treatments at that time and did not go undergo any surgery and or chemotherapy.\par  [FreeTextEntry1] : Mitomycin Day#1, modified schedule RT with Xeloda  [de-identified] : Invasive basaloid squamous carcinoma [de-identified] : Patient presents today after completing 15/15 RT on 8/16/21.  Patient had worsening kandy-anal pain with skin breakdown last week that required a short course of opioids and topical Silvadene.  Patient followed up with Dr. Bolaños on 8/23/21 using oxycodone

## 2021-08-31 NOTE — PHYSICAL EXAM
[Obese] : obese [Normal] : affect appropriate [Restricted in physically strenuous activity but ambulatory and able to carry out work of a light or sedentary nature] : Status 1- Restricted in physically strenuous activity but ambulatory and able to carry out work of a light or sedentary nature, e.g., light house work, office work [de-identified] : anicteric  [de-identified] : normal respiratory effort, no audible wheeze [de-identified] : reg rate

## 2021-09-02 NOTE — DISEASE MANAGEMENT
[Clinical] : TNM Stage: c [N/A] : Currently not applicable [FreeTextEntry4] : anal ca [TTNM] : x [NTNM] : x [MTNM] : x [de-identified] : 0014 [de-identified] : 2383 [de-identified] : Pelvis/Anus

## 2021-09-02 NOTE — HISTORY OF PRESENT ILLNESS
[FreeTextEntry1] : This is a 55 year old male with a history of prostate cancer treated with radiation in 2016 at the [facility] 81 gy / 45 Fractions who presented with a bump in the perirectal area in 2020 that was initially treated as a hemorrhoid , but eventually biopsied in 2021 revealing anal carcinoma. \par He had excisional biopsy 4/6 with doctor Andres Cortez.Pathology revealed invasive basaloid squamous carcinoma, Chloacoogenic carcinoma, 1 centimeter with focal surface erosion. The tumor involved the deep margin. \par pending staging CT CAP 5/12 and seeing Dr. monique 5/26\par \par 8/10/21: Patient presents today for on treatment visit. He has completed 11/15 fx. Today he reports mild discomfort to his anus and has constipation.\par \par 8/2/21 Presents today for OTV completed 5/15 fx.

## 2021-09-02 NOTE — VITALS
[Maximal Pain Intensity: 4/10] : 4/10 [Least Pain Intensity: 0/10] : 0/10 [Pain Description/Quality: ___] : Pain description/quality: [unfilled] [Pain Duration: ___] : Pain duration: [unfilled] [Pain Location: ___] : Pain Location: [unfilled] [NoTreatment Scheduled] : no treatment scheduled [90: Able to carry normal activity; minor signs or symptoms of disease.] : 90: Able to carry normal activity; minor signs or symptoms of disease.  [Pain Interferes with ADLs] : Pain does not interfere with activities of daily living

## 2021-09-02 NOTE — DISEASE MANAGEMENT
[Clinical] : TNM Stage: c [N/A] : Currently not applicable [FreeTextEntry4] : anal ca [TTNM] : x [NTNM] : x [MTNM] : x [de-identified] : 4275 [de-identified] : 8080 [de-identified] : Pelvis/Anus

## 2021-09-02 NOTE — HISTORY OF PRESENT ILLNESS
[FreeTextEntry1] : This is a 55 year old male with a history of prostate cancer treated with radiation in 2016 at the [facility] 81 gy / 45 Fractions who presented with a bump in the perirectal area in 2020 that was initially treated as a hemorrhoid , but eventually biopsied in 2021 revealing anal carcinoma. \par He had excisional biopsy 4/6 with doctor Andres Cortez.Pathology revealed invasive basaloid squamous carcinoma, Chloacoogenic carcinoma, 1 centimeter with focal surface erosion. The tumor involved the deep margin. \par pending staging CT CAP 5/12 and seeing Dr. monique 5/26\par \par Presents today for OTV completed 5/15 fx.

## 2021-09-02 NOTE — VITALS
[Least Pain Intensity: 0/10] : 0/10 [Pain Description/Quality: ___] : Pain description/quality: [unfilled] [Pain Duration: ___] : Pain duration: [unfilled] [Pain Location: ___] : Pain Location: [unfilled] [NoTreatment Scheduled] : no treatment scheduled [90: Able to carry normal activity; minor signs or symptoms of disease.] : 90: Able to carry normal activity; minor signs or symptoms of disease.  [Maximal Pain Intensity: 3/10] : 3/10 [OTC] : OTC [ECOG Performance Status: 1 - Restricted in physically strenuous activity but ambulatory and able to carry out work of a light or sedentary nature] : Performance Status: 1 - Restricted in physically strenuous activity but ambulatory and able to carry out work of a light or sedentary nature, e.g., light house work, office work [Pain Interferes with ADLs] : Pain does not interfere with activities of daily living

## 2021-09-08 ENCOUNTER — RESULT REVIEW (OUTPATIENT)
Age: 56
End: 2021-09-08

## 2021-09-08 ENCOUNTER — APPOINTMENT (OUTPATIENT)
Dept: HEMATOLOGY ONCOLOGY | Facility: CLINIC | Age: 56
End: 2021-09-08
Payer: MEDICAID

## 2021-09-08 ENCOUNTER — APPOINTMENT (OUTPATIENT)
Dept: RADIATION ONCOLOGY | Facility: CLINIC | Age: 56
End: 2021-09-08
Payer: MEDICAID

## 2021-09-08 VITALS
SYSTOLIC BLOOD PRESSURE: 143 MMHG | TEMPERATURE: 96.3 F | RESPIRATION RATE: 17 BRPM | WEIGHT: 230.38 LBS | HEART RATE: 92 BPM | OXYGEN SATURATION: 98 % | DIASTOLIC BLOOD PRESSURE: 99 MMHG | BODY MASS INDEX: 32.15 KG/M2

## 2021-09-08 VITALS
TEMPERATURE: 97.9 F | HEART RATE: 93 BPM | WEIGHT: 229.72 LBS | DIASTOLIC BLOOD PRESSURE: 93 MMHG | BODY MASS INDEX: 32.16 KG/M2 | OXYGEN SATURATION: 98 % | SYSTOLIC BLOOD PRESSURE: 130 MMHG | HEIGHT: 70.94 IN | RESPIRATION RATE: 18 BRPM

## 2021-09-08 LAB
ALBUMIN SERPL ELPH-MCNC: 4.3 G/DL
ALP BLD-CCNC: 88 U/L
ALT SERPL-CCNC: 28 U/L
ANION GAP SERPL CALC-SCNC: 10 MMOL/L
AST SERPL-CCNC: 24 U/L
BASOPHILS # BLD AUTO: 0 K/UL — SIGNIFICANT CHANGE UP (ref 0–0.2)
BASOPHILS NFR BLD AUTO: 0 % — SIGNIFICANT CHANGE UP (ref 0–2)
BILIRUB SERPL-MCNC: <0.2 MG/DL
BUN SERPL-MCNC: 22 MG/DL
CALCIUM SERPL-MCNC: 9.8 MG/DL
CHLORIDE SERPL-SCNC: 103 MMOL/L
CO2 SERPL-SCNC: 30 MMOL/L
CREAT SERPL-MCNC: 1 MG/DL
EOSINOPHIL # BLD AUTO: 0.1 K/UL — SIGNIFICANT CHANGE UP (ref 0–0.5)
EOSINOPHIL NFR BLD AUTO: 2 % — SIGNIFICANT CHANGE UP (ref 0–6)
GLUCOSE SERPL-MCNC: 109 MG/DL
HCT VFR BLD CALC: 41.5 % — SIGNIFICANT CHANGE UP (ref 39–50)
HGB BLD-MCNC: 13.8 G/DL — SIGNIFICANT CHANGE UP (ref 13–17)
LYMPHOCYTES # BLD AUTO: 0.65 K/UL — LOW (ref 1–3.3)
LYMPHOCYTES # BLD AUTO: 13 % — SIGNIFICANT CHANGE UP (ref 13–44)
MCHC RBC-ENTMCNC: 30.2 PG — SIGNIFICANT CHANGE UP (ref 27–34)
MCHC RBC-ENTMCNC: 33.3 G/DL — SIGNIFICANT CHANGE UP (ref 32–36)
MCV RBC AUTO: 90.8 FL — SIGNIFICANT CHANGE UP (ref 80–100)
METAMYELOCYTES # FLD: 1 % — HIGH (ref 0–0)
MONOCYTES # BLD AUTO: 0.45 K/UL — SIGNIFICANT CHANGE UP (ref 0–0.9)
MONOCYTES NFR BLD AUTO: 9 % — SIGNIFICANT CHANGE UP (ref 2–14)
MYELOCYTES NFR BLD: 1 % — HIGH (ref 0–0)
NEUTROPHILS # BLD AUTO: 3.71 K/UL — SIGNIFICANT CHANGE UP (ref 1.8–7.4)
NEUTROPHILS NFR BLD AUTO: 74 % — SIGNIFICANT CHANGE UP (ref 43–77)
NRBC # BLD: 0 /100 — SIGNIFICANT CHANGE UP (ref 0–0)
NRBC # BLD: SIGNIFICANT CHANGE UP /100 WBCS (ref 0–0)
PLAT MORPH BLD: NORMAL — SIGNIFICANT CHANGE UP
PLATELET # BLD AUTO: 216 K/UL — SIGNIFICANT CHANGE UP (ref 150–400)
POTASSIUM SERPL-SCNC: 4.7 MMOL/L
PROT SERPL-MCNC: 6.6 G/DL
RBC # BLD: 4.57 M/UL — SIGNIFICANT CHANGE UP (ref 4.2–5.8)
RBC # FLD: 14.7 % — HIGH (ref 10.3–14.5)
RBC BLD AUTO: SIGNIFICANT CHANGE UP
SODIUM SERPL-SCNC: 143 MMOL/L
WBC # BLD: 5.02 K/UL — SIGNIFICANT CHANGE UP (ref 3.8–10.5)
WBC # FLD AUTO: 5.02 K/UL — SIGNIFICANT CHANGE UP (ref 3.8–10.5)

## 2021-09-08 PROCEDURE — 99024 POSTOP FOLLOW-UP VISIT: CPT | Mod: GC

## 2021-09-08 PROCEDURE — 99214 OFFICE O/P EST MOD 30 MIN: CPT

## 2021-09-08 NOTE — REVIEW OF SYSTEMS
[Fatigue] : fatigue [Anxiety] : anxiety [Negative] : Allergic/Immunologic [FreeTextEntry7] : anal pain

## 2021-09-08 NOTE — HISTORY OF PRESENT ILLNESS
[Disease: _____________________] : Disease: [unfilled] [T: ___] : T[unfilled] [N: ___] : N[unfilled] [M: ___] : M[unfilled] [AJCC Stage: ____] : AJCC Stage: [unfilled] [Treatment Protocol] : Treatment Protocol [de-identified] : In 2020 at age of 55 patient had his screening colonoscopy performed by Dr. Lawrence in June 2020 that as per patient was informed to be within normal limits.  Subsequently patient had felt a bump in the perirectal area and at the time was told by GI that this could be a hemorrhoid and was treated as such.\par His symptoms did improve and was eventually referred to Dr. Avilez for further evaluation and in March 2021 patient was seen by Dr. 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  Patient did not have full excision of the tumor rather a excisional biopsy.\par \par \par Of note patient has a history of prostate cancer treated with radiation in 2016 at the [facility] patient received 45 treatments at that time and did not go undergo any surgery and or chemotherapy.\par  [de-identified] : Invasive basaloid squamous carcinoma [FreeTextEntry1] : Mitomycin Day#1, modified schedule RT with Xeloda completed 8/16 (condensed RT course) [de-identified] : pain better using fentanyl patch and prn oxycodone\par seen by rad onc earlier , cont topical therapy as instructed \par no fever or chills \par no diarrhea

## 2021-09-08 NOTE — VITALS
[Maximal Pain Intensity: 10/10] : 10/10 [Least Pain Intensity: 7/10] : 7/10 [Opioid] : opioid [70: Cares for self; unalbe to carry on normal activity or do active work.] : 70: Cares for self; unable to carry on normal activity or do active work.

## 2021-09-08 NOTE — REVIEW OF SYSTEMS
[Constipation: Grade 0] : Constipation: Grade 0 [Diarrhea: Grade 0] : Diarrhea: Grade 0 [Dysphagia: Grade 0] : Dysphagia: Grade 0 [Nausea: Grade 0] : Nausea: Grade 0 [Vomiting: Grade 0] : Vomiting: Grade 0 [Urinary Incontinence: Grade 0] : Urinary Incontinence: Grade 0  [Hematuria: Grade 0] : Hematuria: Grade 0

## 2021-09-08 NOTE — PHYSICAL EXAM
[Restricted in physically strenuous activity but ambulatory and able to carry out work of a light or sedentary nature] : Status 1- Restricted in physically strenuous activity but ambulatory and able to carry out work of a light or sedentary nature, e.g., light house work, office work [Obese] : obese [Normal] : affect appropriate [de-identified] : anicteric  [de-identified] : normal respiratory effort, no audible wheeze [de-identified] : reg rate

## 2021-09-14 NOTE — DISEASE MANAGEMENT
[Clinical] : TNM Stage: c [N/A] : Currently not applicable [FreeTextEntry4] : anal ca [TTNM] : x [NTNM] : x [MTNM] : x [de-identified] : 2801 [de-identified] : Pelvis/Anus

## 2021-09-14 NOTE — ADDENDUM
[FreeTextEntry1] : I reviewed above and agree.  Patient doing better, skin healng.  Continue supportive management and restart chemotherapy next week.  Se back in 4-6 weeks.\par CHRISTOPHER\par

## 2021-09-14 NOTE — HISTORY OF PRESENT ILLNESS
[FreeTextEntry1] : This is a 55 year old male with a history of prostate cancer treated with radiation in 2016 at the [facility] 81 gy / 45 Fractions who presented with a bump in the perirectal area in 2020 that was initially treated as a hemorrhoid , but eventually biopsied in 2021 revealing anal carcinoma. \par He had excisional biopsy 4/6 with doctor Andres Cortez.Pathology revealed invasive basaloid squamous carcinoma, Chloacoogenic carcinoma, 1 centimeter with focal surface erosion. The tumor involved the deep margin. \par pending staging CT CAP 5/12 and seeing Dr. monique 5/26\par \par Completed treatment on 8/16/21 5400cGy for anal cancer. \par \par Today he presents with 7 out of 10 pain on the perianal skin.  He is taking two 10 mg tablets of oxycodone a day.  He was prescribed to take 10 mg oxycodone q 6 hrs.  He states that his perianal skin is healing well.

## 2021-09-14 NOTE — PHYSICAL EXAM
[Normal] : well developed, well nourished, in no acute distress [FreeTextEntry1] : perianal hyperpigmentation and moist desquamation\par \par groin folds with hyper pigmentation. No moist desquamation.

## 2021-09-27 ENCOUNTER — APPOINTMENT (OUTPATIENT)
Dept: RADIATION ONCOLOGY | Facility: CLINIC | Age: 56
End: 2021-09-27

## 2021-10-06 ENCOUNTER — OUTPATIENT (OUTPATIENT)
Dept: OUTPATIENT SERVICES | Facility: HOSPITAL | Age: 56
LOS: 1 days | Discharge: HOME | End: 2021-10-06

## 2021-10-06 DIAGNOSIS — C18.9 MALIGNANT NEOPLASM OF COLON, UNSPECIFIED: ICD-10-CM

## 2021-10-08 ENCOUNTER — RESULT REVIEW (OUTPATIENT)
Age: 56
End: 2021-10-08

## 2021-10-08 ENCOUNTER — APPOINTMENT (OUTPATIENT)
Dept: HEMATOLOGY ONCOLOGY | Facility: CLINIC | Age: 56
End: 2021-10-08
Payer: MEDICAID

## 2021-10-08 VITALS
WEIGHT: 220 LBS | DIASTOLIC BLOOD PRESSURE: 98 MMHG | RESPIRATION RATE: 16 BRPM | TEMPERATURE: 98 F | HEART RATE: 87 BPM | OXYGEN SATURATION: 98 % | BODY MASS INDEX: 30.73 KG/M2 | SYSTOLIC BLOOD PRESSURE: 137 MMHG

## 2021-10-08 LAB
BASOPHILS # BLD AUTO: 0.04 K/UL — SIGNIFICANT CHANGE UP (ref 0–0.2)
BASOPHILS NFR BLD AUTO: 0.6 % — SIGNIFICANT CHANGE UP (ref 0–2)
EOSINOPHIL # BLD AUTO: 0.11 K/UL — SIGNIFICANT CHANGE UP (ref 0–0.5)
EOSINOPHIL NFR BLD AUTO: 1.5 % — SIGNIFICANT CHANGE UP (ref 0–6)
HCT VFR BLD CALC: 41.2 % — SIGNIFICANT CHANGE UP (ref 39–50)
HGB BLD-MCNC: 13.8 G/DL — SIGNIFICANT CHANGE UP (ref 13–17)
IMM GRANULOCYTES NFR BLD AUTO: 0.8 % — SIGNIFICANT CHANGE UP (ref 0–1.5)
LYMPHOCYTES # BLD AUTO: 1.79 K/UL — SIGNIFICANT CHANGE UP (ref 1–3.3)
LYMPHOCYTES # BLD AUTO: 25.1 % — SIGNIFICANT CHANGE UP (ref 13–44)
MCHC RBC-ENTMCNC: 30.9 PG — SIGNIFICANT CHANGE UP (ref 27–34)
MCHC RBC-ENTMCNC: 33.5 G/DL — SIGNIFICANT CHANGE UP (ref 32–36)
MCV RBC AUTO: 92.4 FL — SIGNIFICANT CHANGE UP (ref 80–100)
MONOCYTES # BLD AUTO: 1.06 K/UL — HIGH (ref 0–0.9)
MONOCYTES NFR BLD AUTO: 14.9 % — HIGH (ref 2–14)
NEUTROPHILS # BLD AUTO: 4.06 K/UL — SIGNIFICANT CHANGE UP (ref 1.8–7.4)
NEUTROPHILS NFR BLD AUTO: 57.1 % — SIGNIFICANT CHANGE UP (ref 43–77)
NRBC # BLD: 0 /100 WBCS — SIGNIFICANT CHANGE UP (ref 0–0)
PLATELET # BLD AUTO: 268 K/UL — SIGNIFICANT CHANGE UP (ref 150–400)
RBC # BLD: 4.46 M/UL — SIGNIFICANT CHANGE UP (ref 4.2–5.8)
RBC # FLD: 17.2 % — HIGH (ref 10.3–14.5)
WBC # BLD: 7.12 K/UL — SIGNIFICANT CHANGE UP (ref 3.8–10.5)
WBC # FLD AUTO: 7.12 K/UL — SIGNIFICANT CHANGE UP (ref 3.8–10.5)

## 2021-10-08 PROCEDURE — 99214 OFFICE O/P EST MOD 30 MIN: CPT

## 2021-10-08 RX ORDER — FENTANYL 25 UG/H
25 PATCH, EXTENDED RELEASE TRANSDERMAL
Qty: 5 | Refills: 0 | Status: DISCONTINUED | COMMUNITY
Start: 2021-08-31 | End: 2021-10-08

## 2021-10-09 LAB
ALBUMIN SERPL ELPH-MCNC: 4.5 G/DL
ALP BLD-CCNC: 85 U/L
ALT SERPL-CCNC: 31 U/L
ANION GAP SERPL CALC-SCNC: 11 MMOL/L
AST SERPL-CCNC: 23 U/L
BILIRUB SERPL-MCNC: 0.4 MG/DL
BUN SERPL-MCNC: 18 MG/DL
CALCIUM SERPL-MCNC: 9.8 MG/DL
CHLORIDE SERPL-SCNC: 102 MMOL/L
CO2 SERPL-SCNC: 28 MMOL/L
CREAT SERPL-MCNC: 1.08 MG/DL
GLUCOSE SERPL-MCNC: 116 MG/DL
POTASSIUM SERPL-SCNC: 4.9 MMOL/L
PROT SERPL-MCNC: 7.4 G/DL
SODIUM SERPL-SCNC: 141 MMOL/L

## 2021-10-11 ENCOUNTER — APPOINTMENT (OUTPATIENT)
Dept: RADIATION ONCOLOGY | Facility: CLINIC | Age: 56
End: 2021-10-11
Payer: MEDICAID

## 2021-10-11 PROCEDURE — 99024 POSTOP FOLLOW-UP VISIT: CPT | Mod: GC

## 2021-10-12 NOTE — PHYSICAL EXAM
[Normal] : well developed, well nourished, in no acute distress [de-identified] : limited due to telephonic visit

## 2021-10-12 NOTE — REVIEW OF SYSTEMS
[Constipation: Grade 0] : Constipation: Grade 0 [Diarrhea: Grade 0] : Diarrhea: Grade 0 [Dysphagia: Grade 0] : Dysphagia: Grade 0 [Nausea: Grade 0] : Nausea: Grade 0 [Vomiting: Grade 0] : Vomiting: Grade 0 [Hematuria: Grade 0] : Hematuria: Grade 0 [Urinary Incontinence: Grade 0] : Urinary Incontinence: Grade 0  [Patient Intake Form Reviewed] : Patient intake form was reviewed

## 2021-10-12 NOTE — HISTORY OF PRESENT ILLNESS
[Home] : at home, [unfilled] , at the time of the visit. [Medical Office: ([facility])___] : at the medical office located in  [Verbal consent obtained from patient] : the patient, [unfilled] [FreeTextEntry1] : This is a 56 year old male with a history of prostate cancer treated with radiation in 2016 at the [facility] 81 gy / 45 Fractions who presented with a bump in the perirectal area in 2020 that was initially treated as a hemorrhoid , but eventually biopsied in 2021 revealing anal carcinoma. He had excisional biopsy 4/6 with doctor Andres Cortez.Pathology revealed invasive basaloid squamous carcinoma, Chloacoogenic carcinoma, 1 centimeter with focal surface erosion. The tumor involved the deep margin. He completed completed treatment on 8/16/21 to 5400cGy/ 15 fractions for anal cancer. \par \par He reports that he still feels rectal pain especially with BM and also rectal pressure. Pain is improved but still requires 10 mg oxycodone q6. He was previously on fentanyl patch for a week which worked well. Reports that this brings the pain to 2/10. Notes burning sensation with bowel movements. No urinary complaints. Reports his per-anal skin is sensitive and dry.\par \par He is to start cycle of xeloda M-F x 2 weeks 9/20 - 10/1

## 2021-10-12 NOTE — VITALS
[Maximal Pain Intensity: 9/10] : 9/10 [Least Pain Intensity: 0/10] : 0/10 [Pain Duration: ___] : Pain duration: [unfilled] [90: Able to carry normal activity; minor signs or symptoms of disease.] : 90: Able to carry normal activity; minor signs or symptoms of disease.

## 2021-10-19 NOTE — HISTORY OF PRESENT ILLNESS
[Disease: _____________________] : Disease: [unfilled] [T: ___] : T[unfilled] [N: ___] : N[unfilled] [M: ___] : M[unfilled] [AJCC Stage: ____] : AJCC Stage: [unfilled] [Treatment Protocol] : Treatment Protocol [de-identified] : In 2020 at age of 55 patient had his screening colonoscopy performed by Dr. Lawrence in June 2020 that as per patient was informed to be within normal limits.  Subsequently patient had felt a bump in the perirectal area and at the time was told by GI that this could be a hemorrhoid and was treated as such.\par His symptoms did improve and was eventually referred to Dr. Avilez for further evaluation and in March 2021 patient was seen by Dr. 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  Patient did not have full excision of the tumor rather a excisional biopsy.\par \par \par Of note patient has a history of prostate cancer treated with radiation in 2016 at the [facility] patient received 45 treatments at that time and did not go undergo any surgery and or chemotherapy.\par  [de-identified] : Invasive basaloid squamous carcinoma [FreeTextEntry1] : Mitomycin Day#1, modified schedule RT with Xeloda completed 8/16 (condensed RT course) [de-identified] : rectal pain improved but not resolved , hasn't d/w rad onc , was under the impression he didn't need to see them anymore\par fatigue improving\par pain with BM \par

## 2021-10-19 NOTE — PHYSICAL EXAM
[Restricted in physically strenuous activity but ambulatory and able to carry out work of a light or sedentary nature] : Status 1- Restricted in physically strenuous activity but ambulatory and able to carry out work of a light or sedentary nature, e.g., light house work, office work [Obese] : obese [Normal] : affect appropriate [de-identified] : anicteric  [de-identified] : normal respiratory effort, no audible wheeze [de-identified] : reg rate

## 2021-10-27 ENCOUNTER — NON-APPOINTMENT (OUTPATIENT)
Age: 56
End: 2021-10-27

## 2021-10-28 ENCOUNTER — NON-APPOINTMENT (OUTPATIENT)
Age: 56
End: 2021-10-28

## 2021-11-11 ENCOUNTER — RESULT REVIEW (OUTPATIENT)
Age: 56
End: 2021-11-11

## 2021-11-11 ENCOUNTER — APPOINTMENT (OUTPATIENT)
Dept: MRI IMAGING | Facility: CLINIC | Age: 56
End: 2021-11-11
Payer: MEDICAID

## 2021-11-11 ENCOUNTER — OUTPATIENT (OUTPATIENT)
Dept: OUTPATIENT SERVICES | Facility: HOSPITAL | Age: 56
LOS: 1 days | End: 2021-11-11

## 2021-11-11 PROCEDURE — 74183 MRI ABD W/O CNTR FLWD CNTR: CPT | Mod: 26

## 2021-11-11 PROCEDURE — 72197 MRI PELVIS W/O & W/DYE: CPT | Mod: 26

## 2021-11-15 ENCOUNTER — APPOINTMENT (OUTPATIENT)
Dept: CT IMAGING | Facility: CLINIC | Age: 56
End: 2021-11-15
Payer: MEDICAID

## 2021-11-15 ENCOUNTER — RESULT REVIEW (OUTPATIENT)
Age: 56
End: 2021-11-15

## 2021-11-15 ENCOUNTER — OUTPATIENT (OUTPATIENT)
Dept: OUTPATIENT SERVICES | Facility: HOSPITAL | Age: 56
LOS: 1 days | Discharge: HOME | End: 2021-11-15

## 2021-11-15 ENCOUNTER — OUTPATIENT (OUTPATIENT)
Dept: OUTPATIENT SERVICES | Facility: HOSPITAL | Age: 56
LOS: 1 days | End: 2021-11-15

## 2021-11-15 DIAGNOSIS — C18.9 MALIGNANT NEOPLASM OF COLON, UNSPECIFIED: ICD-10-CM

## 2021-11-15 PROCEDURE — 71260 CT THORAX DX C+: CPT | Mod: 26

## 2021-11-17 ENCOUNTER — APPOINTMENT (OUTPATIENT)
Dept: SURGERY | Facility: CLINIC | Age: 56
End: 2021-11-17
Payer: MEDICAID

## 2021-11-17 VITALS
DIASTOLIC BLOOD PRESSURE: 101 MMHG | HEIGHT: 70 IN | BODY MASS INDEX: 31.5 KG/M2 | OXYGEN SATURATION: 97 % | WEIGHT: 220 LBS | HEART RATE: 98 BPM | SYSTOLIC BLOOD PRESSURE: 142 MMHG | TEMPERATURE: 98.7 F

## 2021-11-17 PROCEDURE — 99213 OFFICE O/P EST LOW 20 MIN: CPT

## 2021-11-18 ENCOUNTER — APPOINTMENT (OUTPATIENT)
Dept: HEMATOLOGY ONCOLOGY | Facility: CLINIC | Age: 56
End: 2021-11-18
Payer: MEDICAID

## 2021-11-18 VITALS
RESPIRATION RATE: 17 BRPM | DIASTOLIC BLOOD PRESSURE: 98 MMHG | BODY MASS INDEX: 30.83 KG/M2 | HEART RATE: 102 BPM | HEIGHT: 70.87 IN | OXYGEN SATURATION: 96 % | SYSTOLIC BLOOD PRESSURE: 148 MMHG | TEMPERATURE: 97.7 F | WEIGHT: 220.24 LBS

## 2021-11-18 PROCEDURE — 99203 OFFICE O/P NEW LOW 30 MIN: CPT

## 2021-11-18 RX ORDER — CAPECITABINE 500 MG/1
500 TABLET ORAL
Qty: 56 | Refills: 0 | Status: DISCONTINUED | COMMUNITY
Start: 2021-07-27 | End: 2021-11-18

## 2021-11-24 ENCOUNTER — OUTPATIENT (OUTPATIENT)
Dept: OUTPATIENT SERVICES | Facility: HOSPITAL | Age: 56
LOS: 1 days | Discharge: HOME | End: 2021-11-24
Payer: MEDICAID

## 2021-11-24 VITALS
WEIGHT: 223.99 LBS | RESPIRATION RATE: 18 BRPM | HEART RATE: 102 BPM | DIASTOLIC BLOOD PRESSURE: 91 MMHG | TEMPERATURE: 98 F | OXYGEN SATURATION: 98 % | SYSTOLIC BLOOD PRESSURE: 125 MMHG | HEIGHT: 71 IN

## 2021-11-24 DIAGNOSIS — B20 HUMAN IMMUNODEFICIENCY VIRUS [HIV] DISEASE: ICD-10-CM

## 2021-11-24 DIAGNOSIS — K60.3 ANAL FISTULA: ICD-10-CM

## 2021-11-24 DIAGNOSIS — K62.9 DISEASE OF ANUS AND RECTUM, UNSPECIFIED: Chronic | ICD-10-CM

## 2021-11-24 DIAGNOSIS — I10 ESSENTIAL (PRIMARY) HYPERTENSION: ICD-10-CM

## 2021-11-24 DIAGNOSIS — Z01.818 ENCOUNTER FOR OTHER PREPROCEDURAL EXAMINATION: ICD-10-CM

## 2021-11-24 DIAGNOSIS — E11.9 TYPE 2 DIABETES MELLITUS WITHOUT COMPLICATIONS: ICD-10-CM

## 2021-11-24 LAB
ANION GAP SERPL CALC-SCNC: 4 MMOL/L — LOW (ref 5–17)
BASOPHILS # BLD AUTO: 0.04 K/UL — SIGNIFICANT CHANGE UP (ref 0–0.2)
BASOPHILS NFR BLD AUTO: 0.6 % — SIGNIFICANT CHANGE UP (ref 0–2)
BUN SERPL-MCNC: 19 MG/DL — SIGNIFICANT CHANGE UP (ref 7–23)
CALCIUM SERPL-MCNC: 9 MG/DL — SIGNIFICANT CHANGE UP (ref 8.5–10.1)
CHLORIDE SERPL-SCNC: 107 MMOL/L — SIGNIFICANT CHANGE UP (ref 96–108)
CO2 SERPL-SCNC: 32 MMOL/L — HIGH (ref 22–31)
CREAT SERPL-MCNC: 1.02 MG/DL — SIGNIFICANT CHANGE UP (ref 0.5–1.3)
EOSINOPHIL # BLD AUTO: 0.1 K/UL — SIGNIFICANT CHANGE UP (ref 0–0.5)
EOSINOPHIL NFR BLD AUTO: 1.5 % — SIGNIFICANT CHANGE UP (ref 0–6)
GLUCOSE SERPL-MCNC: 128 MG/DL — HIGH (ref 70–99)
HCT VFR BLD CALC: 39 % — SIGNIFICANT CHANGE UP (ref 39–50)
HGB BLD-MCNC: 12.9 G/DL — LOW (ref 13–17)
IMM GRANULOCYTES NFR BLD AUTO: 0.4 % — SIGNIFICANT CHANGE UP (ref 0–1.5)
LYMPHOCYTES # BLD AUTO: 1.76 K/UL — SIGNIFICANT CHANGE UP (ref 1–3.3)
LYMPHOCYTES # BLD AUTO: 25.6 % — SIGNIFICANT CHANGE UP (ref 13–44)
MCHC RBC-ENTMCNC: 30.8 PG — SIGNIFICANT CHANGE UP (ref 27–34)
MCHC RBC-ENTMCNC: 33.1 GM/DL — SIGNIFICANT CHANGE UP (ref 32–36)
MCV RBC AUTO: 93.1 FL — SIGNIFICANT CHANGE UP (ref 80–100)
MONOCYTES # BLD AUTO: 0.9 K/UL — SIGNIFICANT CHANGE UP (ref 0–0.9)
MONOCYTES NFR BLD AUTO: 13.1 % — SIGNIFICANT CHANGE UP (ref 2–14)
NEUTROPHILS # BLD AUTO: 4.05 K/UL — SIGNIFICANT CHANGE UP (ref 1.8–7.4)
NEUTROPHILS NFR BLD AUTO: 58.8 % — SIGNIFICANT CHANGE UP (ref 43–77)
NRBC # BLD: 0 /100 WBCS — SIGNIFICANT CHANGE UP (ref 0–0)
PLATELET # BLD AUTO: 261 K/UL — SIGNIFICANT CHANGE UP (ref 150–400)
POTASSIUM SERPL-MCNC: 3.6 MMOL/L — SIGNIFICANT CHANGE UP (ref 3.5–5.3)
POTASSIUM SERPL-SCNC: 3.6 MMOL/L — SIGNIFICANT CHANGE UP (ref 3.5–5.3)
RBC # BLD: 4.19 M/UL — LOW (ref 4.2–5.8)
RBC # FLD: 13.6 % — SIGNIFICANT CHANGE UP (ref 10.3–14.5)
SODIUM SERPL-SCNC: 143 MMOL/L — SIGNIFICANT CHANGE UP (ref 135–145)
WBC # BLD: 6.88 K/UL — SIGNIFICANT CHANGE UP (ref 3.8–10.5)
WBC # FLD AUTO: 6.88 K/UL — SIGNIFICANT CHANGE UP (ref 3.8–10.5)

## 2021-11-24 PROCEDURE — 93010 ELECTROCARDIOGRAM REPORT: CPT

## 2021-11-24 RX ORDER — SODIUM CHLORIDE 9 MG/ML
3 INJECTION INTRAMUSCULAR; INTRAVENOUS; SUBCUTANEOUS EVERY 8 HOURS
Refills: 0 | Status: DISCONTINUED | OUTPATIENT
Start: 2021-11-26 | End: 2021-12-10

## 2021-11-24 NOTE — H&P PST ADULT - PROBLEM SELECTOR PLAN 4
Preop instructions provided including NPO status. Hibiclens wash for infection control. Patient aware to stop NSAID, OTC herbals  for 7-10 days, needs to be accompanied  by adult upon discharge.  Patient verbalized understanding  anesthesiologist to review pst labs, ekg, medical clearances and optimization for surgery

## 2021-11-24 NOTE — H&P PST ADULT - NSANTHOSAYNRD_GEN_A_CORE
No. KACEY screening performed.  STOP BANG Legend: 0-2 = LOW Risk; 3-4 = INTERMEDIATE Risk; 5-8 = HIGH Risk

## 2021-11-24 NOTE — H&P PST ADULT - ASSESSMENT
anal fistula   CAPRINI SCORE    AGE RELATED RISK FACTORS                                                       MOBILITY RELATED FACTORS  [x ] Age 41-60 years                                            (1 Point)                  [ ] Bed rest                                                        (1 Point)  [ ] Age: 61-74 years                                           (2 Points)                [ ] Plaster cast                                                   (2 Points)  [ ] Age= 75 years                                              (3 Points)                 [ ] Bed bound for more than 72 hours                   (2 Points)    DISEASE RELATED RISK FACTORS                                               GENDER SPECIFIC FACTORS  [ ] Edema in the lower extremities                       (1 Point)                  [ ] Pregnancy                                                     (1 Point)  [ ] Varicose veins                                               (1 Point)                  [ ] Post-partum < 6 weeks                                   (1 Point)             x[ ] BMI > 25 Kg/m2                                            (1 Point)                  [ ] Hormonal therapy  or oral contraception            (1 Point)                 [ ] Sepsis (in the previous month)                        (1 Point)                  [ ] History of pregnancy complications  [ ] Pneumonia or serious lung disease                                               [ ] Unexplained or recurrent                       (1 Point)           (in the previous month)                               (1 Point)  [ ] Abnormal pulmonary function test                     (1 Point)                 SURGERY RELATED RISK FACTORS  [ ] Acute myocardial infarction                              (1 Point)                 [ ]  Section                                            (1 Point)  [ ] Congestive heart failure (in the previous month)  (1 Point)                 x[ ] Minor surgery                                                 (1 Point)   [ ] Inflammatory bowel disease                             (1 Point)                 [ ] Arthroscopic surgery                                        (2 Points)  [ ] Central venous access                                    (2 Points)                [ ] General surgery lasting more than 45 minutes   (2 Points)       [ ] Stroke (in the previous month)                          (5 Points)               [ ] Elective arthroplasty                                        (5 Points)                                                                                                                                               HEMATOLOGY RELATED FACTORS                                                 TRAUMA RELATED RISK FACTORS  [ ] Prior episodes of VTE                                     (3 Points)                 [ ] Fracture of the hip, pelvis, or leg                       (5 Points)  [ ] Positive family history for VTE                         (3 Points)                 [ ] Acute spinal cord injury (in the previous month)  (5 Points)  [ ] Prothrombin 15434 A                                      (3 Points)                 [ ] Paralysis  (less than 1 month)                          (5 Points)  [ ] Factor V Leiden                                             (3 Points)                 [ ] Multiple Trauma within 1 month                         (5 Points)  [ ] Lupus anticoagulants                                     (3 Points)                                                           [ ] Anticardiolipin antibodies                                (3 Points)                                                       [ ] High homocysteine in the blood                      (3 Points)                                             [ ] Other congenital or acquired thrombophilia       (3 Points)                                                [ ] Heparin induced thrombocytopenia                  (3 Points)                                          Total Score [  3        ]  Caprini Score 0-2: Low risk, No VTE Prophylaxis required for most patient's, encourage ambulation  Caprini Score 3-6: At Risk, Pharmacologic VTE prophylaxis is indicated for most patients ( in the absence of a contraindication)  Caprini Score Greater than or = 7: High Risk , pharmacologic VTE is indicated for most patients ( in the absence of a contraindication)    Caprini score indicates that the patient is high risk for VTE event ( score 6 or greater). Surgical patient's in this group will benefit from both pharmacologic prophylaxis and intermittent compression devices . Surgical team will determine the balance between VTE  risk and bleeding risk and other clinical considerations

## 2021-11-24 NOTE — H&P PST ADULT - NSICDXPASTMEDICALHX_GEN_ALL_CORE_FT
PAST MEDICAL HISTORY:  Anxiety     Depressive disorder     DM (diabetes mellitus)     HIV infection     HLD (hyperlipidemia)     HTN (hypertension)

## 2021-11-24 NOTE — H&P PST ADULT - HISTORY OF PRESENT ILLNESS
55 yo male with anal fistula - scheduled for anal fistulotomy   denies recent travels in the past 30 days. No fever, SOB, cough, flu like symptoms or body rash- covid screen

## 2021-11-25 ENCOUNTER — TRANSCRIPTION ENCOUNTER (OUTPATIENT)
Age: 56
End: 2021-11-25

## 2021-11-26 ENCOUNTER — APPOINTMENT (OUTPATIENT)
Dept: SURGERY | Facility: HOSPITAL | Age: 56
End: 2021-11-26

## 2021-11-26 ENCOUNTER — RESULT REVIEW (OUTPATIENT)
Age: 56
End: 2021-11-26

## 2021-11-26 ENCOUNTER — OUTPATIENT (OUTPATIENT)
Dept: OUTPATIENT SERVICES | Facility: HOSPITAL | Age: 56
LOS: 1 days | Discharge: HOME | End: 2021-11-26
Payer: MEDICAID

## 2021-11-26 VITALS
HEART RATE: 91 BPM | OXYGEN SATURATION: 99 % | HEIGHT: 71 IN | RESPIRATION RATE: 14 BRPM | SYSTOLIC BLOOD PRESSURE: 118 MMHG | DIASTOLIC BLOOD PRESSURE: 83 MMHG | TEMPERATURE: 98 F | WEIGHT: 223.99 LBS

## 2021-11-26 VITALS
HEART RATE: 92 BPM | OXYGEN SATURATION: 98 % | TEMPERATURE: 99 F | SYSTOLIC BLOOD PRESSURE: 147 MMHG | DIASTOLIC BLOOD PRESSURE: 94 MMHG | RESPIRATION RATE: 14 BRPM

## 2021-11-26 DIAGNOSIS — K62.9 DISEASE OF ANUS AND RECTUM, UNSPECIFIED: Chronic | ICD-10-CM

## 2021-11-26 LAB — GLUCOSE BLDC GLUCOMTR-MCNC: 110 MG/DL — HIGH (ref 70–99)

## 2021-11-26 PROCEDURE — 45300 PROCTOSIGMOIDOSCOPY DX: CPT

## 2021-11-26 PROCEDURE — 45160 EXCISION OF RECTAL LESION: CPT

## 2021-11-26 PROCEDURE — 88305 TISSUE EXAM BY PATHOLOGIST: CPT | Mod: 26

## 2021-11-26 RX ORDER — LIDOCAINE 4 G/100G
1 CREAM TOPICAL
Qty: 30 | Refills: 0
Start: 2021-11-26

## 2021-11-26 RX ORDER — FENTANYL CITRATE 50 UG/ML
25 INJECTION INTRAVENOUS
Refills: 0 | Status: DISCONTINUED | OUTPATIENT
Start: 2021-11-26 | End: 2021-11-26

## 2021-11-26 RX ORDER — FENTANYL CITRATE 50 UG/ML
50 INJECTION INTRAVENOUS
Refills: 0 | Status: DISCONTINUED | OUTPATIENT
Start: 2021-11-26 | End: 2021-11-26

## 2021-11-26 RX ORDER — METOCLOPRAMIDE HCL 10 MG
10 TABLET ORAL ONCE
Refills: 0 | Status: DISCONTINUED | OUTPATIENT
Start: 2021-11-26 | End: 2021-11-26

## 2021-11-26 RX ORDER — METOPROLOL TARTRATE 50 MG
3 TABLET ORAL ONCE
Refills: 0 | Status: COMPLETED | OUTPATIENT
Start: 2021-11-26 | End: 2021-11-26

## 2021-11-26 RX ORDER — ACETAMINOPHEN 500 MG
1000 TABLET ORAL ONCE
Refills: 0 | Status: COMPLETED | OUTPATIENT
Start: 2021-11-26 | End: 2021-11-26

## 2021-11-26 RX ORDER — SODIUM CHLORIDE 9 MG/ML
1000 INJECTION, SOLUTION INTRAVENOUS
Refills: 0 | Status: DISCONTINUED | OUTPATIENT
Start: 2021-11-26 | End: 2021-11-26

## 2021-11-26 RX ADMIN — SODIUM CHLORIDE 75 MILLILITER(S): 9 INJECTION, SOLUTION INTRAVENOUS at 09:52

## 2021-11-26 RX ADMIN — Medication 400 MILLIGRAM(S): at 09:43

## 2021-11-26 RX ADMIN — FENTANYL CITRATE 25 MICROGRAM(S): 50 INJECTION INTRAVENOUS at 09:45

## 2021-11-26 RX ADMIN — FENTANYL CITRATE 25 MICROGRAM(S): 50 INJECTION INTRAVENOUS at 10:16

## 2021-11-26 RX ADMIN — Medication 1000 MILLIGRAM(S): at 10:16

## 2021-11-26 RX ADMIN — Medication 3 MILLIGRAM(S): at 09:46

## 2021-11-26 NOTE — BRIEF OPERATIVE NOTE - NSICDXBRIEFPROCEDURE_GEN_ALL_CORE_FT
PROCEDURES:  Rectal biopsy 26-Nov-2021 09:11:40  Dewayne Kelsey  Sigmoidoscopy 26-Nov-2021 09:11:54  Dewayne Kelsey

## 2021-11-26 NOTE — ASU DISCHARGE PLAN (ADULT/PEDIATRIC) - NURSING INSTRUCTIONS
Rest today, Follow up with Dr. Avilez as planned. Frequent hand washing advised, to prevent infection.

## 2021-11-29 ENCOUNTER — APPOINTMENT (OUTPATIENT)
Dept: RADIATION ONCOLOGY | Facility: CLINIC | Age: 56
End: 2021-11-29
Payer: MEDICAID

## 2021-11-29 LAB — SURGICAL PATHOLOGY STUDY: SIGNIFICANT CHANGE UP

## 2021-11-29 PROCEDURE — 99213 OFFICE O/P EST LOW 20 MIN: CPT

## 2021-11-29 NOTE — REVIEW OF SYSTEMS
[Anal Pain: Grade 2 - Moderate pain; limiting instrumental ADL] : Anal Pain: Grade 2 - Moderate pain; limiting instrumental ADL [Constipation: Grade 0] : Constipation: Grade 0 [Diarrhea: Grade 0] : Diarrhea: Grade 0 [Rectal Pain: Grade 2 - Moderate pain; limiting instrumental ADL] : Rectal Pain: Grade 2 - Moderate pain; limiting instrumental ADL

## 2021-11-29 NOTE — VITALS
[Maximal Pain Intensity: 10/10] : 10/10 [Least Pain Intensity: 3/10] : 3/10 [Pain Description/Quality: ___] : Pain description/quality: [unfilled] [Pain Duration: ___] : Pain duration: [unfilled] [Pain Location: ___] : Pain Location: [unfilled] [Pain Interferes with ADLs] : Pain interferes with activities of daily living. [Opioid] : opioid [70: Cares for self; unalbe to carry on normal activity or do active work.] : 70: Cares for self; unable to carry on normal activity or do active work.

## 2021-12-01 ENCOUNTER — APPOINTMENT (OUTPATIENT)
Dept: HEMATOLOGY ONCOLOGY | Facility: CLINIC | Age: 56
End: 2021-12-01
Payer: MEDICAID

## 2021-12-01 PROCEDURE — 99213 OFFICE O/P EST LOW 20 MIN: CPT | Mod: 95

## 2021-12-01 RX ORDER — OXYCODONE 10 MG/1
10 TABLET ORAL
Qty: 30 | Refills: 0 | Status: DISCONTINUED | COMMUNITY
Start: 2021-08-19 | End: 2021-12-01

## 2021-12-01 NOTE — PHYSICAL EXAM
[General Appearance - Alert] : alert [General Appearance - In No Acute Distress] : in no acute distress [Sclera] : the sclera and conjunctiva were normal [Normal Oral Mucosa] : normal oral mucosa [] : no respiratory distress [Auscultation Breath Sounds / Voice Sounds] : lungs were clear to auscultation bilaterally [Heart Rate And Rhythm] : heart rate was normal and rhythm regular [Heart Sounds] : normal S1 and S2 [Edema] : there was no peripheral edema [Bowel Sounds] : normal bowel sounds [Abdomen Soft] : soft [Abdomen Tenderness] : non-tender [Skin Color & Pigmentation] : normal skin color and pigmentation [No Focal Deficits] : no focal deficits [Oriented To Time, Place, And Person] : oriented to person, place, and time [Affect] : the affect was normal [FreeTextEntry1] : patient declined exam due to severe pain

## 2021-12-01 NOTE — HISTORY OF PRESENT ILLNESS
[FreeTextEntry1] : 56yoF with anal cancer presents for initial palliative care visit, referred by Oncology.  \par PMH significant for prostate cancer s/p RT in 2016, HIV on Biktarvy, \par \par \par In 2020 at age of 55 patient had his screening colonoscopy performed by Dr. Lawrence in June 2020 that as per patient was informed to be within normal limits. Subsequently patient had felt a bump in the perirectal area and at the time was told by GI that this could be a hemorrhoid and was treated as such.\par His symptoms did improve and was eventually referred to Dr. Avilez for further evaluation and in March 2021 patient was seen by Dr. 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 Patient did not have full excision of the tumor rather a excisional biopsy.\par \par Main reason for which patient is referred is pain which has been an issue for him for the last 3 months. The pain is localized to the anus/rectum. It is severe, not well controlled. Worse with defecation, sitting.  At its worst the pain is 10/10. Rates it as 8/10 presently. \par \par He is currently using Oxycodone IR 20mg PRN, takes this every 6 hours.  He was previously tried on transdermal fentanyl 25mcg/hr  and oxycontin but he states that neither helped his pain. He no longer uses either. \par Has tried using anusol suppository without relief. \par \par He was evaluated by colorectal surgery recently and will be going for a potential fistulotomy next week. \par \par ROS:\par +hot flushes from prostate ca therapy\par +uses dulcolax daily as well as metamucil, he has soft BMs daily\par +anxiety d/o - uses alprazolam 1mg PRN, olanzapine 10mg QHS\par +insomnia - is on long-standing ambien 10mg QHS \par Appetite is good\par \par Patient is , lives with his wife. \par He is not presently driving, uses a car service to get to medical appointments. \par Quit smoking tobacco about two years ago. No EtOH. \par Passes time by watching TV. \par \par Psych[facility]par \par I-Stop Ref#: [id number]

## 2021-12-01 NOTE — DATA REVIEWED
[FreeTextEntry1] : MRI Pelvis (11/2021): \par FINDINGS: No residual anal mass. There is focal T2 hypointense scarring at the region of anal mass, in the left anterior aspect (23:26).\par At the region of previous mass, there is focal thinning of the anal internal sphincter and a tubular fluid signal intensity, extending anteroinferiorly within the intersphincteric space, approximately 1.8 cm in length, without definite extension into perianal skin. Mild surrounding enhancement.\par \par No suspicious enlarged lymph nodes. No focal abnormality in the other visceral organs or osseous structures.\par \par IMPRESSION:\par No residual tumor, with focal scarring. No suspicious lymphadenopathy.\par Findings suspicious for active left anterior intersphincteric sinus tract, without definite skin opening. Clinical correlation is recommended.\par

## 2021-12-01 NOTE — ASSESSMENT
[FreeTextEntry1] : 56yoM with:\par \par 1. Anal Cancer - s/p 5400cGy/ 15 fractions completed on 8/16/21 with Mitomycin Day #1. Xeloda discontinued 2/2 GI issues. Follow up with Med Onc, Rad Onc. \par \par 2. Anal pain, acute, 2/2 possible fistula - Increase frequency of Oxycodone IR 20mg usage to [id number]. Patient to go to OR with Dr. Avilez for possible fistulotomy next week. \par \par 3. Anxiety d/o - follows closely with psychiatry. \par \par 4. Encounter for Palliative Care - Emotional support provided.\par \par Follow up in 2 weeks, call sooner with questions or issues

## 2021-12-01 NOTE — REASON FOR VISIT
[Follow-Up] : a follow-up visit [Home] : at home, [unfilled] , at the time of the visit. [Medical Office: ([facility])___] : at the medical office located in  [Verbal consent obtained from patient] : the patient, [unfilled]

## 2021-12-02 DIAGNOSIS — Z85.46 PERSONAL HISTORY OF MALIGNANT NEOPLASM OF PROSTATE: ICD-10-CM

## 2021-12-02 DIAGNOSIS — E66.9 OBESITY, UNSPECIFIED: ICD-10-CM

## 2021-12-02 DIAGNOSIS — K62.89 OTHER SPECIFIED DISEASES OF ANUS AND RECTUM: ICD-10-CM

## 2021-12-02 DIAGNOSIS — F32.A DEPRESSION, UNSPECIFIED: ICD-10-CM

## 2021-12-02 DIAGNOSIS — Z85.048 PERSONAL HISTORY OF OTHER MALIGNANT NEOPLASM OF RECTUM, RECTOSIGMOID JUNCTION, AND ANUS: ICD-10-CM

## 2021-12-02 DIAGNOSIS — E11.9 TYPE 2 DIABETES MELLITUS WITHOUT COMPLICATIONS: ICD-10-CM

## 2021-12-02 DIAGNOSIS — E78.5 HYPERLIPIDEMIA, UNSPECIFIED: ICD-10-CM

## 2021-12-02 DIAGNOSIS — Z79.84 LONG TERM (CURRENT) USE OF ORAL HYPOGLYCEMIC DRUGS: ICD-10-CM

## 2021-12-02 DIAGNOSIS — I10 ESSENTIAL (PRIMARY) HYPERTENSION: ICD-10-CM

## 2021-12-02 DIAGNOSIS — Z87.891 PERSONAL HISTORY OF NICOTINE DEPENDENCE: ICD-10-CM

## 2021-12-02 DIAGNOSIS — Z21 ASYMPTOMATIC HUMAN IMMUNODEFICIENCY VIRUS [HIV] INFECTION STATUS: ICD-10-CM

## 2021-12-02 DIAGNOSIS — Z79.82 LONG TERM (CURRENT) USE OF ASPIRIN: ICD-10-CM

## 2021-12-02 DIAGNOSIS — F41.9 ANXIETY DISORDER, UNSPECIFIED: ICD-10-CM

## 2021-12-02 NOTE — ADDENDUM
[FreeTextEntry1] : Patient was doing better but biopsy has caused pain again.  He has pain meds and understands it may take awhile for the area to heal.  No cancer on the biopsy.  Will follow along.\shreyas WHITE

## 2021-12-02 NOTE — HISTORY OF PRESENT ILLNESS
[FreeTextEntry1] : This is a 56 year old male with a history of prostate cancer treated with radiation in 2016 at the [facility] 81 gy / 45 Fractions who presented with a bump in the perirectal area in 2020 that was initially treated as a hemorrhoid , but eventually biopsied in 2021 revealing anal carcinoma. He had excisional biopsy 4/6 with doctor Andres Cortez.Pathology revealed invasive basaloid squamous carcinoma, Chloacoogenic carcinoma, 1 centimeter with focal surface erosion. The tumor involved the deep margin. He completed completed treatment on 8/16/21 to 5400cGy/ 15 fractions for anal cancer. \par \par He has started Xeloda M-F x 2 weeks 9/20 - 10/1.\par \par 11/29/2021 - \par Patient returns for followup.\par \par Interval Scans MRI from 11/11/2021 shows no residual tumor, with focal scarring. No suspicious lymphadenopathy. There was noted concern for left anterior intersphincteric sinus tract. CT Chest interim shows no metastatic disease. \par \par Patient seen by Dr. Sams for palliative care and pain w/ BM. Also seen by Dr. Avilez - Gen Surg and possible plan for fistulectomy. Reports his per-anal skin is sensitive and dry.  \par He has increase pain secondary to biopsy of the rectal ulcer last week.  Pathology shows benign colorectal tissue with ulcer, granulation tissue and fibrosis.  He reports that he still feels rectal pain especially with BM and also rectal pressure. Pain is improved but still requires 10 mg oxycodone q6.

## 2021-12-03 NOTE — HISTORY OF PRESENT ILLNESS
[FreeTextEntry1] : 56yoF with anal cancer presents for follow-up palliative care visit, referred by Oncology.  \par PMH significant for prostate cancer s/p RT in 2016, HIV on Biktarvy\par \par In 2020 at age of 55 patient had his screening colonoscopy performed by Dr. Lawrence in June 2020 that as per patient was informed to be within normal limits. Subsequently patient had felt a bump in the perirectal area and at the time was told by GI that this could be a hemorrhoid and was treated as such.\par His symptoms did improve and was eventually referred to Dr. Avilez for further evaluation and in March 2021 patient was seen by Dr. Avilez and underwent an exam and biopsy in April that revealed evidence of anal squamous cell carcinoma. Patient was subsequently referred for evaluation and treatment. Patient did not have full excision of the tumor rather a excisional biopsy.\par \par Main reason for which patient is referred is pain which has been an issue for him for the last 3 months. The pain is localized to the anus/rectum. It is severe, not well controlled. Worse with defecation, sitting. At its worst the pain is 10/10. Rates it as 8/10 presently. \par \par He is currently using Oxycodone IR 20mg PRN, takes this every 6 hours. He was previously tried on transdermal fentanyl 25mcg/hr and oxycontin but he states that neither helped his pain. He no longer uses either. \par Has tried using anusol suppository without relief. \par \par He was evaluated by colorectal surgery recently and will be going for a potential fistulotomy next week. \par \par Interval History (12/1/21):\par Patient presents for follow-up, seen via TeleMedicine.  \par He is s/p biopsy of rectal ulcer and sigmoidoscopy with Dr. Avilez on 11/26/21. He is awaiting results. Since the procedure, he has been experiencing a rise in rectal pain.  The pain necessitates the use of PRN oxycodone IR 20mg q3-4h ATC. Pain gets as high as 10/10, as low as 2/10 for approximately 3 hours after PRN use.\par \par ROS:\par + appetite is good\par +hot flushes from prostate ca therapy\par +uses dulcolax daily as well as metamucil, he has soft BMs daily\par +anxiety d/o - uses alprazolam 1mg PRN, olanzapine 10mg QHS\par +insomnia - is on long-standing ambien 10mg QHS \par Denies nausea/vomiting \par \par Patient is , lives with his wife. \par He is not presently driving, uses a car service to get to medical appointments. \par Quit smoking tobacco about two years ago. No EtOH. \par Passes time by watching TV. \par \par Psychiatrist: Dr. Eric Yin ([facility])\par Urologist: Dr. Jung \par \par I-Stop Ref#: [id number]

## 2021-12-03 NOTE — ASSESSMENT
[FreeTextEntry1] : 56yoM with:\par \par 1. Anal Cancer - s/p 5400cGy/ 15 fractions completed on 8/16/21 with Mitomycin Day #1. Xeloda discontinued 2/2 GI issues. Follow up with Med Onc, Rad Onc. \par \par 2. Anal pain, acute- Patient with increased rectal pain due to procedure.  Will c/w Oxycodone IR 20mg usage [id number] and will re-evaluate in 2 weeks.  Should PRN usage remain ATC, discussed consideration of need for long-acting opioid. \par \par 3. Anxiety d/o - Follows closely with psychiatry. \par \par 4. Encounter for Palliative Care - Emotional support provided.\par \par Follow up in 2 weeks, call sooner with questions or issues

## 2021-12-08 ENCOUNTER — APPOINTMENT (OUTPATIENT)
Dept: SURGERY | Facility: CLINIC | Age: 56
End: 2021-12-08
Payer: MEDICAID

## 2021-12-08 VITALS
HEART RATE: 94 BPM | OXYGEN SATURATION: 96 % | DIASTOLIC BLOOD PRESSURE: 91 MMHG | RESPIRATION RATE: 17 BRPM | WEIGHT: 220 LBS | SYSTOLIC BLOOD PRESSURE: 143 MMHG | TEMPERATURE: 98.1 F | HEIGHT: 70 IN | BODY MASS INDEX: 31.5 KG/M2

## 2021-12-08 PROCEDURE — 99024 POSTOP FOLLOW-UP VISIT: CPT

## 2021-12-10 ENCOUNTER — APPOINTMENT (OUTPATIENT)
Dept: HEMATOLOGY ONCOLOGY | Facility: CLINIC | Age: 56
End: 2021-12-10
Payer: MEDICAID

## 2021-12-10 PROCEDURE — 99214 OFFICE O/P EST MOD 30 MIN: CPT | Mod: 95

## 2021-12-12 NOTE — HISTORY OF PRESENT ILLNESS
[Disease: _____________________] : Disease: [unfilled] [T: ___] : T[unfilled] [N: ___] : N[unfilled] [M: ___] : M[unfilled] [AJCC Stage: ____] : AJCC Stage: [unfilled] [Treatment Protocol] : Treatment Protocol [Home] : at home, [unfilled] , at the time of the visit. [Medical Office: ([facility])___] : at the medical office located in  [Verbal consent obtained from patient] : the patient, [unfilled] [de-identified] : In 2020 at age of 55 patient had his screening colonoscopy performed by Dr. Lawrence in June 2020 that as per patient was informed to be within normal limits.  Subsequently patient had felt a bump in the perirectal area and at the time was told by GI that this could be a hemorrhoid and was treated as such.\par His symptoms did improve and was eventually referred to Dr. Avilez for further evaluation and in March 2021 patient was seen by Dr. 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  Patient did not have full excision of the tumor rather a excisional biopsy.\par \par \par Of note patient has a history of prostate cancer treated with radiation in 2016 at the [facility] patient received 45 treatments at that time and did not go undergo any surgery and or chemotherapy.\par  [de-identified] : Invasive basaloid squamous carcinoma [FreeTextEntry1] : Mitomycin Day#1, modified schedule RT with Xeloda completed 8/16 (condensed RT course) [de-identified] : Patient presents today for follow up after completing treatment.  He recently had an EUA by Dr. Avilez on 11/26/21 for evaluation of persistent kandy-anal pain and a 4 x 4 cm ulcer was found but no fissure or fistula.  A biopsy was performed and was negative for dysplasia or malignancy.  Patient states that he was told that sxs were related to his previous RT and he should follow up with Dr. Bolaños.  He had an appointment on 11/29/21 with Dr. Bolaños but states that no new recommendations were made.  He met with Dr. Galan's Palliative Care team last week and his opioids were increased with some relief but he still reports episodes of 10/10 pain.  He states that pain is worse with BM's and with standing for extended periods of time.  He continues on a bowel regimen and has soft BM's.  He also performs Sitz bathes 2-3 times a day which provide relief while in the bath only.  Denies abdominal pain, diarrhea, constipation, rectal bleeding or melena.\par

## 2021-12-12 NOTE — PHYSICAL EXAM
[Restricted in physically strenuous activity but ambulatory and able to carry out work of a light or sedentary nature] : Status 1- Restricted in physically strenuous activity but ambulatory and able to carry out work of a light or sedentary nature, e.g., light house work, office work [Obese] : obese [Normal] : affect appropriate [de-identified] : anicteric  [de-identified] : no JVD [de-identified] : normal respiratory effort, no audible wheeze

## 2021-12-15 ENCOUNTER — APPOINTMENT (OUTPATIENT)
Dept: HEMATOLOGY ONCOLOGY | Facility: CLINIC | Age: 56
End: 2021-12-15
Payer: MEDICAID

## 2021-12-15 PROCEDURE — 99213 OFFICE O/P EST LOW 20 MIN: CPT | Mod: 95

## 2021-12-15 NOTE — REASON FOR VISIT
[Follow-Up] : a follow-up visit [Home] : at home, [unfilled] , at the time of the visit. [Medical Office: ([facility])___] : at the medical office located in  [Verbal consent obtained from patient] : the patient, [unfilled]

## 2021-12-16 ENCOUNTER — RESULT REVIEW (OUTPATIENT)
Age: 56
End: 2021-12-16

## 2021-12-16 NOTE — HISTORY OF PRESENT ILLNESS
[FreeTextEntry1] : 56yoF with anal cancer presents for follow-up palliative care visit, referred by Oncology.  \par PMH significant for prostate cancer s/p RT in 2016, HIV on Biktarvy\par \par In 2020 at age of 55 patient had his screening colonoscopy performed by Dr. Lawrence in June 2020 that as per patient was informed to be within normal limits. Subsequently patient had felt a bump in the perirectal area and at the time was told by GI that this could be a hemorrhoid and was treated as such.\par His symptoms did improve and was eventually referred to Dr. Avilez for further evaluation and in March 2021 patient was seen by Dr. Avilez and underwent an exam and biopsy in April that revealed evidence of anal squamous cell carcinoma. Patient was subsequently referred for evaluation and treatment. Patient did not have full excision of the tumor rather a excisional biopsy.\par \par Main reason for which patient is referred is pain which has been an issue for him for the last 3 months. The pain is localized to the anus/rectum. It is severe, not well controlled. Worse with defecation, sitting. At its worst the pain is 10/10. Rates it as 8/10 presently. \par \par He is currently using Oxycodone IR 20mg PRN, takes this every 6 hours. He was previously tried on transdermal fentanyl 25mcg/hr and oxycontin but he states that neither helped his pain. He no longer uses either. \par Has tried using anusol suppository without relief. \par \par Interval History (12/15/21):\par Patient presents for follow-up, seen via TeleMedicine.  \par He recently had an EUA by Dr. Avilez on 11/26/21 for evaluation of persistent kandy-anal pain and a 4 x 4 cm ulcer was found but no fissure or fistula. A biopsy was performed and was negative for dysplasia or malignancy.  The pain continues to necessitate the use of PRN oxycodone IR 20mg q3-4h ATC. Pain gets as high as 10/10, as low as 2/10 for approximately 3 hours after PRN use.  He states that pain is worse with BM's and with standing for extended periods of time. He continues on a bowel regimen and has soft BM's. He also performs Sitz bathes 2-3 times a day which provide relief while in the bath only. \par \par ROS:\par + appetite is good\par +hot flushes from prostate ca therapy\par +uses dulcolax daily as well as metamucil, he has soft BMs daily\par +anxiety d/o - uses alprazolam 1mg PRN, olanzapine 10mg QHS\par +insomnia - is on long-standing ambien 10mg QHS \par Denies nausea/vomiting \par \par Patient is , lives with his wife. \par He is not presently driving, uses a car service to get to medical appointments. \par Quit smoking tobacco about two years ago. No EtOH. \par Passes time by watching TV. \par \par Psychiatrist: Dr. Eric Yin ([facility])\par Urologist: Dr. Jung \par \par I-Stop Ref#:  [id number]

## 2021-12-16 NOTE — ASSESSMENT
[FreeTextEntry1] : 56yoM with:\par \par 1. Anal Cancer - s/p 5400cGy/ 15 fractions completed on 8/16/21 with Mitomycin Day #1. Xeloda discontinued 2/2 GI issues. Follow up with Med Onc, Rad Onc. \par \par 2. Anal pain, acute- Given sustained ATC IR opioid requirements, will start MS ER 15mg BID.  C/w Oxycodone IR 20mg usage [id number].\par \par 3. Anxiety d/o - Follows closely with psychiatry. \par \par 4. Encounter for Palliative Care - Emotional support provided.\par \par Follow up in 2 weeks, call sooner with questions or issues

## 2021-12-23 ENCOUNTER — NON-APPOINTMENT (OUTPATIENT)
Age: 56
End: 2021-12-23

## 2021-12-23 ENCOUNTER — OUTPATIENT (OUTPATIENT)
Dept: OUTPATIENT SERVICES | Facility: HOSPITAL | Age: 56
LOS: 1 days | Discharge: HOME | End: 2021-12-23

## 2021-12-23 DIAGNOSIS — C18.9 MALIGNANT NEOPLASM OF COLON, UNSPECIFIED: ICD-10-CM

## 2021-12-23 DIAGNOSIS — K62.9 DISEASE OF ANUS AND RECTUM, UNSPECIFIED: Chronic | ICD-10-CM

## 2021-12-27 ENCOUNTER — APPOINTMENT (OUTPATIENT)
Dept: HEMATOLOGY ONCOLOGY | Facility: CLINIC | Age: 56
End: 2021-12-27
Payer: MEDICAID

## 2021-12-27 PROCEDURE — 99213 OFFICE O/P EST LOW 20 MIN: CPT | Mod: 95

## 2021-12-28 PROBLEM — Z00.00 ENCOUNTER FOR PREVENTIVE HEALTH EXAMINATION: Noted: 2021-12-28

## 2021-12-29 ENCOUNTER — APPOINTMENT (OUTPATIENT)
Dept: WOUND CARE | Facility: CLINIC | Age: 56
End: 2021-12-29
Payer: MEDICAID

## 2021-12-29 VITALS — HEIGHT: 71 IN | BODY MASS INDEX: 30.8 KG/M2 | TEMPERATURE: 97.1 F | WEIGHT: 220 LBS

## 2021-12-29 PROCEDURE — 99203 OFFICE O/P NEW LOW 30 MIN: CPT

## 2021-12-30 NOTE — HISTORY OF PRESENT ILLNESS
[FreeTextEntry1] : 56yoF with anal cancer presents for follow-up palliative care visit, referred by Oncology.  \par PMH significant for prostate cancer s/p RT in 2016, HIV on Biktarvy.\par \par In 2020 at age of 55 patient had his screening colonoscopy performed by Dr. Lawrence in June 2020 that as per patient was informed to be within normal limits. Subsequently patient had felt a bump in the perirectal area and at the time was told by GI that this could be a hemorrhoid and was treated as such.\par His symptoms did improve and was eventually referred to Dr. Avilez for further evaluation and in March 2021 patient was seen by Dr. Avilez and underwent an exam and biopsy in April that revealed evidence of anal squamous cell carcinoma. Patient was subsequently referred for evaluation and treatment. Patient did not have full excision of the tumor rather a excisional biopsy.\par \par Main reason for which patient is referred is pain which has been an issue for him for the last 3 months. The pain is localized to the anus/rectum. It is severe, not well controlled. Worse with defecation, sitting. At its worst the pain is 10/10. Rates it as 8/10 presently. \par \par He is currently using Oxycodone IR 20mg PRN, takes this every 6 hours. He was previously tried on transdermal fentanyl 25mcg/hr and oxycontin but he states that neither helped his pain. He no longer uses either. \par Has tried using anusol suppository without relief. \par \par Interval History (12/27/21):\par Patient presents for follow-up, seen via TeleMedicine.  \par He recently had an EUA by Dr. Avilez on 11/26/21 for evaluation of persistent kandy-anal pain and a 4 x 4 cm ulcer was found but no fissure or fistula. A biopsy was performed and was negative for dysplasia or malignancy.  He will be seeing Dr. Kim on 12/29/21 for evaluation of hyperbaric oxygen treatment.  \par \par He is now on MS ER 15mg BID and reports minimal improvement.  The pain continues to necessitate the use of PRN oxycodone IR 20mg q4h ATC. Pain gets as high as 10/10, as low as 2/10 for approximately 3 hours after PRN use.  He states that pain is worse with BM's and with standing for extended periods of time. He continues on a bowel regimen and has soft BM's. He also performs Sitz bathes 2-3 times a day which provide relief while in the bath only. \par \par ROS:\par + appetite is good\par +hot flushes from prostate ca therapy\par +uses dulcolax daily as well as metamucil, he has soft BMs daily\par +anxiety d/o - uses alprazolam 1mg PRN, olanzapine 10mg QHS\par +insomnia - is on long-standing ambien 10mg QHS \par Denies nausea/vomiting \par \par Patient is , lives with his wife. \par He is not presently driving, uses a car service to get to medical appointments. \par Quit smoking tobacco about two years ago. No EtOH. \par Passes time by watching TV. \par \par Psychiatrist: Dr. Eric Yin ([facility])\par Urologist: Dr. Jung \par \par I-Stop Ref#:  [id number]

## 2021-12-30 NOTE — ASSESSMENT
[FreeTextEntry1] : 56yoM with:\par \par 1. Anal Cancer - s/p 5400cGy/ 15 fractions completed on 8/16/21 with Mitomycin Day #1. Xeloda discontinued 2/2 GI issues. Follow up with Med Onc, Rad Onc. \par \par 2. Anal pain, acute- Given sustained ATC IR opioid requirements, will increase MS ER to 30mg BID.  C/w Oxycodone IR 20mg [id number].\par - He will be evaluated for potential hyperbaric treatment.\par \par 3. Anxiety d/o - Follows closely with psychiatry. \par \par 4. Encounter for Palliative Care - Emotional support provided.\par \par Follow up in 2 weeks, call sooner with questions or issues

## 2022-01-11 NOTE — REASON FOR VISIT
[Follow-Up] : a follow-up visit [Home] : at home, [unfilled] , at the time of the visit. [Medical Office: ([facility])___] : at the medical office located in  [Verbal consent obtained from patient] : the patient, [unfilled] other

## 2022-01-12 ENCOUNTER — APPOINTMENT (OUTPATIENT)
Dept: HEMATOLOGY ONCOLOGY | Facility: CLINIC | Age: 57
End: 2022-01-12
Payer: MEDICAID

## 2022-01-12 ENCOUNTER — APPOINTMENT (OUTPATIENT)
Dept: SURGERY | Facility: CLINIC | Age: 57
End: 2022-01-12
Payer: MEDICAID

## 2022-01-12 VITALS
TEMPERATURE: 97.1 F | OXYGEN SATURATION: 98 % | BODY MASS INDEX: 31.5 KG/M2 | DIASTOLIC BLOOD PRESSURE: 100 MMHG | WEIGHT: 220 LBS | SYSTOLIC BLOOD PRESSURE: 137 MMHG | HEIGHT: 70 IN | HEART RATE: 110 BPM

## 2022-01-12 PROCEDURE — 99213 OFFICE O/P EST LOW 20 MIN: CPT | Mod: 95

## 2022-01-12 PROCEDURE — 99024 POSTOP FOLLOW-UP VISIT: CPT

## 2022-01-12 RX ORDER — NALOXONE HYDROCHLORIDE 4 MG/.1ML
4 SPRAY NASAL
Qty: 1 | Refills: 0 | Status: ACTIVE | COMMUNITY
Start: 2022-01-12 | End: 1900-01-01

## 2022-01-12 NOTE — ASSESSMENT
[FreeTextEntry1] : 56yoM with:\par \par 1. Anal Cancer - s/p 5400cGy/ 15 fractions completed on 8/16/21 with Mitomycin Day #1. Xeloda discontinued 2/2 GI issues. Follow up with Med Onc, Rad Onc. \par \par 2. Anal pain, acute- Given sustained ATC IR opioid requirements, will increase MS ER to 30mg TD.  C/w Oxycodone IR 20mg [id number] PRN.\par - He will be evaluated for potential hyperbaric treatment. Has CT scan upcoming.\par \par 3. Anxiety d/o - Follows closely with psychiatry. \par \par 4. Encounter for Palliative Care - Emotional support provided.\par \par Follow up in 2 weeks, call sooner with questions or issues

## 2022-01-12 NOTE — HISTORY OF PRESENT ILLNESS
[FreeTextEntry1] : 56yoF with anal cancer presents for follow-up palliative care visit, referred by Oncology.  \par PMH significant for prostate cancer s/p RT in 2016, HIV on Biktarvy.\par \par In 2020 at age of 55 patient had his screening colonoscopy performed by Dr. Lawrence in June 2020 that as per patient was informed to be within normal limits. Subsequently patient had felt a bump in the perirectal area and at the time was told by GI that this could be a hemorrhoid and was treated as such.\par His symptoms did improve and was eventually referred to Dr. Avilez for further evaluation and in March 2021 patient was seen by Dr. Avilez and underwent an exam and biopsy in April that revealed evidence of anal squamous cell carcinoma. Patient was subsequently referred for evaluation and treatment. Patient did not have full excision of the tumor rather a excisional biopsy.\par \par Main reason for which patient is referred is pain which has been an issue for him for the last 3 months. The pain is localized to the anus/rectum. It is severe, not well controlled. Worse with defecation, sitting. At its worst the pain is 10/10. Rates it as 8/10 presently. \par \par He is currently using Oxycodone IR 20mg PRN, takes this every 6 hours. He was previously tried on transdermal fentanyl 25mcg/hr and oxycontin but he states that neither helped his pain. He no longer uses either. \par Has tried using anusol suppository without relief. \par \par Underwent EUA by Dr. Avilez on 11/26/21 for evaluation of persistent kandy-anal pain and a 4 x 4 cm ulcer was found but no fissure or fistula. A biopsy was performed and was negative for dysplasia or malignancy.  \par \par Interval History (1/12/22):\par Patient presents for follow-up, seen via TeleMedicine.  \par He saw Dr. Kim on 12/29/21 for evaluation of hyperbaric oxygen treatment. He was seen by Dr. Avilez today, underwent exam.  No active malignancy on bx from 11/26.  He is ordered for CT scan.  \par \par He is now on MS ER 30mg BID and continues to report minimal improvement.  The pain continues to necessitate the use of PRN oxycodone IR 20mg q4h ATC. Pain gets as high as 10/10, as low as 2/10 for approximately 3 hours after PRN use.  He states that pain is worse with BM's and with standing for extended periods of time. He continues on a bowel regimen and has soft BM's. He also performs Sitz bathes 2-3 times a day which provide relief while in the bath only. \par \par ROS:\par + appetite is good\par +hot flushes from prostate ca therapy\par +uses colace, dulcolax, prune juice daily as well as metamucil, he has soft BMs daily\par +anxiety d/o - uses alprazolam 1mg PRN, olanzapine 10mg QHS\par +insomnia - is on long-standing ambien 10mg QHS \par Denies nausea/vomiting \par \par Patient is , lives with his wife. \par He is not presently driving, uses a car service to get to medical appointments. \par Quit smoking tobacco about two years ago. No EtOH. \par Passes time by watching TV. \par \par Psychiatrist: Dr. Eric Yin ([facility])\par Urologist: Dr. Jung \par \par I-Stop Ref#:  [id number]

## 2022-01-18 ENCOUNTER — APPOINTMENT (OUTPATIENT)
Dept: CT IMAGING | Facility: IMAGING CENTER | Age: 57
End: 2022-01-18
Payer: MEDICAID

## 2022-01-18 ENCOUNTER — OUTPATIENT (OUTPATIENT)
Dept: OUTPATIENT SERVICES | Facility: HOSPITAL | Age: 57
LOS: 1 days | End: 2022-01-18
Payer: MEDICAID

## 2022-01-18 DIAGNOSIS — K62.9 DISEASE OF ANUS AND RECTUM, UNSPECIFIED: Chronic | ICD-10-CM

## 2022-01-18 DIAGNOSIS — C21.0 MALIGNANT NEOPLASM OF ANUS, UNSPECIFIED: ICD-10-CM

## 2022-01-18 PROCEDURE — 74177 CT ABD & PELVIS W/CONTRAST: CPT

## 2022-01-18 PROCEDURE — 74177 CT ABD & PELVIS W/CONTRAST: CPT | Mod: 26

## 2022-01-18 PROCEDURE — 82565 ASSAY OF CREATININE: CPT

## 2022-01-25 ENCOUNTER — OUTPATIENT (OUTPATIENT)
Dept: OUTPATIENT SERVICES | Facility: HOSPITAL | Age: 57
LOS: 1 days | Discharge: HOME | End: 2022-01-25

## 2022-01-25 ENCOUNTER — APPOINTMENT (OUTPATIENT)
Dept: RADIATION ONCOLOGY | Facility: CLINIC | Age: 57
End: 2022-01-25
Payer: MEDICAID

## 2022-01-25 VITALS
TEMPERATURE: 96.8 F | RESPIRATION RATE: 17 BRPM | HEART RATE: 112 BPM | WEIGHT: 215 LBS | BODY MASS INDEX: 30.85 KG/M2 | SYSTOLIC BLOOD PRESSURE: 138 MMHG | OXYGEN SATURATION: 97 % | DIASTOLIC BLOOD PRESSURE: 91 MMHG

## 2022-01-25 DIAGNOSIS — K62.9 DISEASE OF ANUS AND RECTUM, UNSPECIFIED: Chronic | ICD-10-CM

## 2022-01-25 DIAGNOSIS — C18.9 MALIGNANT NEOPLASM OF COLON, UNSPECIFIED: ICD-10-CM

## 2022-01-25 PROCEDURE — 99213 OFFICE O/P EST LOW 20 MIN: CPT | Mod: GC

## 2022-01-25 RX ORDER — HYDROCORTISONE 2.5% 25 MG/G
2.5 CREAM TOPICAL TWICE DAILY
Qty: 2 | Refills: 2 | Status: DISCONTINUED | COMMUNITY
Start: 2021-10-28 | End: 2022-01-25

## 2022-01-25 RX ORDER — HYDROCORTISONE ACETATE 25 MG/1
25 SUPPOSITORY RECTAL
Qty: 6 | Refills: 0 | Status: DISCONTINUED | COMMUNITY
Start: 2021-08-19 | End: 2022-01-25

## 2022-01-25 RX ORDER — SILVER SULFADIAZINE 10 MG/G
1 CREAM TOPICAL TWICE DAILY
Qty: 1 | Refills: 2 | Status: DISCONTINUED | COMMUNITY
Start: 2021-08-19 | End: 2022-01-25

## 2022-01-25 RX ORDER — HYDROCORTISONE ACETATE 25 MG/1
25 SUPPOSITORY RECTAL TWICE DAILY
Qty: 40 | Refills: 2 | Status: DISCONTINUED | COMMUNITY
Start: 2021-10-11 | End: 2022-01-25

## 2022-01-25 RX ORDER — ONDANSETRON 8 MG/1
8 TABLET ORAL
Qty: 15 | Refills: 0 | Status: DISCONTINUED | COMMUNITY
Start: 2021-07-27 | End: 2022-01-25

## 2022-01-25 NOTE — VITALS
[Maximal Pain Intensity: 9/10] : 9/10 [Pain Location: ___] : Pain Location: [unfilled] [Opioid] : opioid [70: Cares for self; unalbe to carry on normal activity or do active work.] : 70: Cares for self; unable to carry on normal activity or do active work.

## 2022-01-25 NOTE — REASON FOR VISIT
[Follow-Up] : a follow-up visit [Home] : at home, [unfilled] , at the time of the visit. [Medical Office: ([facility])___] : at the medical office located in  [Verbal consent obtained from patient] : the patient, [unfilled]

## 2022-01-26 ENCOUNTER — APPOINTMENT (OUTPATIENT)
Dept: HEMATOLOGY ONCOLOGY | Facility: CLINIC | Age: 57
End: 2022-01-26
Payer: MEDICAID

## 2022-01-26 PROCEDURE — 99214 OFFICE O/P EST MOD 30 MIN: CPT | Mod: 95

## 2022-01-26 NOTE — HISTORY OF PRESENT ILLNESS
[FreeTextEntry1] : 56yoF with anal cancer presents for follow-up palliative care visit, referred by Oncology.  \par PMH significant for prostate cancer s/p RT in 2016, HIV on Biktarvy.\par \par In 2020 at age of 55 patient had his screening colonoscopy performed by Dr. Lawrence in June 2020 that as per patient was informed to be within normal limits. Subsequently patient had felt a bump in the perirectal area and at the time was told by GI that this could be a hemorrhoid and was treated as such.\par His symptoms did improve and was eventually referred to Dr. Avilez for further evaluation and in March 2021 patient was seen by Dr. Avilez and underwent an exam and biopsy in April that revealed evidence of anal squamous cell carcinoma. Patient was subsequently referred for evaluation and treatment. Patient did not have full excision of the tumor rather a excisional biopsy.\par \par Main reason for which patient is referred is pain which has been an issue for him for the last 3 months. The pain is localized to the anus/rectum. It is severe, not well controlled. Worse with defecation, sitting. At its worst the pain is 10/10. Rates it as 8/10 presently. \par \par He is currently using Oxycodone IR 20mg PRN, takes this every 6 hours. He was previously tried on transdermal fentanyl 25mcg/hr and oxycontin but he states that neither helped his pain. He no longer uses either. \par Has tried using anusol suppository without relief. \par \par Underwent EUA by Dr. Avilez on 11/26/21 for evaluation of persistent kandy-anal pain and a 4 x 4 cm ulcer was found but no fissure or fistula. A biopsy was performed and was negative for dysplasia or malignancy.  \par \par Interval History:\par Patient presents for follow-up, seen via TeleMedicine.  He saw Dr. Kim on 12/29/21 for evaluation of hyperbaric oxygen treatment and is scheduled to follow-up with him tomorrow.  HBOT is pending insurance authorization. No active malignancy on bx from 11/26/21.\par \par Patient reports worsening pain in recent days.  He is now on MS ER 30mg TID and continues to report minimal improvement.  The pain continues to necessitate the use of PRN oxycodone IR 20mg q4h ATC. Pain gets as high as 10/10, as low as 4/10 for approximately 3 hours after PRN use. He states that pain is worse with BM's and with standing for extended periods of time. He continues on a bowel regimen and has soft BM's. He also performs Sitz bathes 2-3 times a day which provide relief while in the bath only. \par \par ROS:\par + appetite is good\par + hot flushes from prostate ca therapy\par +uses colace, dulcolax, prune juice daily as well as metamucil, he has soft BMs daily\par +anxiety d/o - uses alprazolam 1mg PRN, olanzapine 10mg QHS\par +insomnia - is on long-standing ambien 10mg QHS \par Denies nausea/vomiting.\par \par Patient is , lives with his wife. \par He is not presently driving, uses a car service to get to medical appointments. \par Quit smoking tobacco about two years ago. No EtOH. \par Passes time by watching TV. \par \par [facility]par Urologist: Dr. Jung \par \par I-Stop Ref#: [id number]

## 2022-01-26 NOTE — ASSESSMENT
[FreeTextEntry1] : 56yoM with:\par \par 1. Anal Cancer - s/p 5400cGy/ 15 fractions completed on 8/16/21 with Mitomycin Day #1. Xeloda discontinued 2/2 GI issues. Follow up with Med Onc, Rad Onc, wound care. \par \par 2. Anal pain, acute- Given sustained ATC IR opioid requirements, will increase MS ER to 45mg TID. Given worsening pain, may increase Oxycodone IR to 30mg q4h temporarily and until treatment is started.   If no improvement, will consider short acting opioid rotation.\par - Insurance is pending for potential hyperbaric treatment. \par \par 3. Anxiety d/o - Follows closely with psychiatry. \par \par 4. Encounter for Palliative Care - Emotional support provided.\par \par Follow up in 2 weeks, call sooner with questions or issues.

## 2022-01-27 ENCOUNTER — APPOINTMENT (OUTPATIENT)
Dept: WOUND CARE | Facility: CLINIC | Age: 57
End: 2022-01-27
Payer: MEDICAID

## 2022-01-27 PROCEDURE — 99204 OFFICE O/P NEW MOD 45 MIN: CPT

## 2022-01-27 NOTE — HISTORY OF PRESENT ILLNESS
[FreeTextEntry1] : 57 yo male was recently treated for anal Carcinoma using the Lorenzo Protocol\par He reports having a repeat anal biopsy by Dr Avilez several weeks ago that was negative for recurrent anal cancer\par He complains of localized pain and bleeding at anal site\par He inquires about role of HBO- he reports being refereed by RT MD\par He has had prior treatment with RT for prostate cancer and is currently using Lupron\par Reports that he has not visited Dr Avilez recently , but has an appointment in 2 wks

## 2022-01-27 NOTE — ASSESSMENT
[FreeTextEntry1] : advised to return to Dr held to ensure that anal ulcer is RT reklated and not a recurrent anal cancer \par \par Will RTO once this is done \par Discussed the role of HBO with RT related wounds and the length of treatment needed \par Denies a PMH of PTX , or seizures , or ear problems\par He reports some relief with use of Sitz baths , and we discussed the potential use of hydrocortisone suppositories

## 2022-01-27 NOTE — PHYSICAL EXAM
[de-identified] : well developed , good hygiene [de-identified] : not labored [de-identified] : with anal area effaced , a wound is noted at anal verge@ 6 o'clock,  [de-identified] : no bleeding noted from anal wound [de-identified] : ambulatory [de-identified] : laineytoos [de-identified] : conversant [Normal Thyroid] : the thyroid was normal [Normal Breath Sounds] : Normal breath sounds [Normal Heart Sounds] : normal heart sounds [Please See PDF for [employer]] : Please See PDF for [employer].

## 2022-01-29 LAB
ALBUMIN SERPL ELPH-MCNC: 4.3 G/DL
ALP BLD-CCNC: 97 U/L
ALT SERPL-CCNC: 9 U/L
ANION GAP SERPL CALC-SCNC: 17 MMOL/L
AST SERPL-CCNC: 16 U/L
BASOPHILS # BLD AUTO: 0.02 K/UL
BASOPHILS NFR BLD AUTO: 0.2 %
BILIRUB SERPL-MCNC: <0.2 MG/DL
BUN SERPL-MCNC: 13 MG/DL
CALCIUM SERPL-MCNC: 9.5 MG/DL
CEA SERPL-MCNC: 0.8 NG/ML
CHLORIDE SERPL-SCNC: 97 MMOL/L
CO2 SERPL-SCNC: 25 MMOL/L
CREAT SERPL-MCNC: 0.86 MG/DL
EOSINOPHIL # BLD AUTO: 0.01 K/UL
EOSINOPHIL NFR BLD AUTO: 0.1 %
GLUCOSE SERPL-MCNC: 142 MG/DL
HCT VFR BLD CALC: 40 %
HGB BLD-MCNC: 12.9 G/DL
IMM GRANULOCYTES NFR BLD AUTO: 0.5 %
LYMPHOCYTES # BLD AUTO: 1.35 K/UL
LYMPHOCYTES NFR BLD AUTO: 15.9 %
MAN DIFF?: NORMAL
MCHC RBC-ENTMCNC: 29.3 PG
MCHC RBC-ENTMCNC: 32.3 GM/DL
MCV RBC AUTO: 90.7 FL
MONOCYTES # BLD AUTO: 0.85 K/UL
MONOCYTES NFR BLD AUTO: 10 %
NEUTROPHILS # BLD AUTO: 6.23 K/UL
NEUTROPHILS NFR BLD AUTO: 73.3 %
PLATELET # BLD AUTO: 363 K/UL
POTASSIUM SERPL-SCNC: 3.6 MMOL/L
PROT SERPL-MCNC: 7.8 G/DL
RBC # BLD: 4.41 M/UL
RBC # FLD: 12.6 %
SODIUM SERPL-SCNC: 139 MMOL/L
WBC # FLD AUTO: 8.5 K/UL

## 2022-01-29 NOTE — HISTORY OF PRESENT ILLNESS
[FreeTextEntry1] : This is a 56 year old male with a history of prostate cancer treated with radiation in 2016 at the [facility] 81 gy / 45 Fractions who presented with a bump in the perirectal area in 2020 that was initially treated as a hemorrhoid , but eventually biopsied in 2021 revealing anal carcinoma. He had excisional biopsy 4/6 with doctor Andres Cortez.Pathology revealed invasive basaloid squamous carcinoma, Chloacoogenic carcinoma, 1 centimeter with focal surface erosion. The tumor involved the deep margin. He completed completed treatment on 8/16/21 to 5400cGy/ 15 fractions for anal cancer. \par \par He has started Xeloda M-F x 2 weeks 9/20 - 10/1.\par \par 11/29/2021 - \par Patient returns for followup.\par \par Interval Scans MRI from 11/11/2021 shows no residual tumor, with focal scarring. No suspicious lymphadenopathy. There was noted concern for left anterior intersphincteric sinus tract. CT Chest interim shows no metastatic disease. \par \par Patient seen by Dr. Sams for palliative care and pain w/ BM. Also seen by Dr. Avilez - Gen Surg and possible plan for fistulectomy. Reports his per-anal skin is sensitive and dry.  \par He has increase pain secondary to biopsy of the rectal ulcer last week.  Pathology shows benign colorectal tissue with ulcer, granulation tissue and fibrosis.  He reports that he still feels rectal pain especially with BM and also rectal pressure. Pain is improved but still requires 10 mg oxycodone q6.\par \par 1/25/22:  Mr Turcios presents today for routine follow up.  He is still experiencing rectal pain for which he is using MS ER, oxycodone and Sitz bathes.  He continues on his bowel regimen and soft BMs.\par

## 2022-01-29 NOTE — ADDENDUM
[FreeTextEntry1] : I was present with the Resident during the key/critical portions of the visit. I discussed the case with the Resident and agree with the findings and plan as documented in the Resident 's note, unless noted below.\par We will work with wound care to get him hyperbaric oxygen treatments.  In the interim we will avoid any biopsies unless there are changes seen on imaging.\par [facility]\par

## 2022-01-29 NOTE — REVIEW OF SYSTEMS
[Fatigue] : fatigue [Constipation: Grade 1 - Occasional or intermittent symptoms; occasional use of stool softeners, laxatives, dietary modification, or enema] : Constipation: Grade 1 - Occasional or intermittent symptoms; occasional use of stool softeners, laxatives, dietary modification, or enema [Fatigue: Grade 1 - Fatigue relieved by rest] : Fatigue: Grade 1 - Fatigue relieved by rest [Hematuria: Grade 0] : Hematuria: Grade 0 [Urinary Tract Pain: Grade 0] : Urinary Tract Pain: Grade 0 [Fever] : no fever [Chills] : no chills [Night Sweats] : no night sweats [Recent Change In Weight] : ~T no recent weight change

## 2022-01-29 NOTE — PHYSICAL EXAM
[Normal] : normal heart rate and rhythm, normal S1 and S2, and no murmurs present [de-identified] : standing due to rectal pain [FreeTextEntry1] : skin surrounding rectal area with mild erythema

## 2022-01-31 NOTE — ASSESSMENT
[FreeTextEntry1] : \par Patient here to day for Hyperbaric Oxygen treatment evaluation for delayed radiation injury to anal canal. \par \par  Patient reports skin at the anal canal is sensitive and dry. \par He has increase pain and bleeding.  Pathology shows benign colorectal tissue with ulcer, granulation tissue and fibrosis. He reports that he still feels rectal pain especially with BM and also rectal pressure. Pain  improved with  10 mg oxycodone q6.\par Patient is Candidate for HBO treatment\par Discussed the role of HBO with RT related wounds and the length of treatment needed \par Denies a PMH of PTX , or seizures , or ear problems\par He reports some relief with use of Sitz baths , and we discussed the potential use of hydrocortisone suppositories\par Chest Xray clear\par Thorough education and orientation provided to the patient\par Patient in agreement, signed all the consents\par Await or auth

## 2022-01-31 NOTE — HISTORY OF PRESENT ILLNESS
[FreeTextEntry1] : 55 yo male was recently treated for anal Carcinoma using the Lorenzo Protocol\par He reports having a repeat anal biopsy by Dr Avilez several weeks ago that was negative for recurrent anal cancer\par He complains of localized pain and bleeding at anal site\par He inquires about role of HBO- he reports being refereed by RT MD\par He has had prior treatment with RT for prostate cancer and is currently using Lupron\par Patient saw Dr. Bolaños radioncologist and was cleared to start HBO treatment\par \par

## 2022-01-31 NOTE — PHYSICAL EXAM
[de-identified] : WNL [de-identified] : SHERRIEL [de-identified] : well developed , good hygiene [de-identified] : not labored [de-identified] : with anal area effaced , a wound is noted at anal verge@ 6 o'clock,  [de-identified] : no bleeding noted from anal wound [de-identified] : ambulatory [de-identified] : laineytoos [de-identified] : conversant

## 2022-02-01 ENCOUNTER — NON-APPOINTMENT (OUTPATIENT)
Age: 57
End: 2022-02-01

## 2022-02-01 RX ORDER — SILVER SULFADIAZINE 10 MG/G
1 CREAM TOPICAL TWICE DAILY
Qty: 1 | Refills: 0 | Status: ACTIVE | COMMUNITY
Start: 2022-02-01 | End: 1900-01-01

## 2022-02-07 ENCOUNTER — OUTPATIENT (OUTPATIENT)
Dept: OUTPATIENT SERVICES | Facility: HOSPITAL | Age: 57
LOS: 1 days | End: 2022-02-07
Payer: MEDICAID

## 2022-02-07 ENCOUNTER — APPOINTMENT (OUTPATIENT)
Dept: HYPERBARIC MEDICINE | Facility: CLINIC | Age: 57
End: 2022-02-07
Payer: MEDICAID

## 2022-02-07 VITALS
OXYGEN SATURATION: 99 % | TEMPERATURE: 209.66 F | DIASTOLIC BLOOD PRESSURE: 90 MMHG | HEART RATE: 100 BPM | SYSTOLIC BLOOD PRESSURE: 154 MMHG | RESPIRATION RATE: 18 BRPM

## 2022-02-07 VITALS
OXYGEN SATURATION: 99 % | RESPIRATION RATE: 18 BRPM | HEART RATE: 80 BPM | SYSTOLIC BLOOD PRESSURE: 164 MMHG | TEMPERATURE: 209.66 F | DIASTOLIC BLOOD PRESSURE: 70 MMHG

## 2022-02-07 DIAGNOSIS — K62.9 DISEASE OF ANUS AND RECTUM, UNSPECIFIED: Chronic | ICD-10-CM

## 2022-02-07 PROCEDURE — 99183 HYPERBARIC OXYGEN THERAPY: CPT

## 2022-02-07 PROCEDURE — 82962 GLUCOSE BLOOD TEST: CPT

## 2022-02-07 PROCEDURE — G0277: CPT

## 2022-02-08 ENCOUNTER — APPOINTMENT (OUTPATIENT)
Dept: HYPERBARIC MEDICINE | Facility: CLINIC | Age: 57
End: 2022-02-08
Payer: MEDICAID

## 2022-02-08 ENCOUNTER — OUTPATIENT (OUTPATIENT)
Dept: OUTPATIENT SERVICES | Facility: HOSPITAL | Age: 57
LOS: 1 days | End: 2022-02-08
Payer: MEDICAID

## 2022-02-08 VITALS
RESPIRATION RATE: 18 BRPM | HEART RATE: 76 BPM | TEMPERATURE: 209.66 F | DIASTOLIC BLOOD PRESSURE: 88 MMHG | SYSTOLIC BLOOD PRESSURE: 132 MMHG | OXYGEN SATURATION: 98 %

## 2022-02-08 DIAGNOSIS — K62.9 DISEASE OF ANUS AND RECTUM, UNSPECIFIED: Chronic | ICD-10-CM

## 2022-02-08 DIAGNOSIS — M79.673 PAIN IN UNSPECIFIED FOOT: ICD-10-CM

## 2022-02-08 PROCEDURE — 99183 HYPERBARIC OXYGEN THERAPY: CPT

## 2022-02-08 PROCEDURE — G0277: CPT

## 2022-02-08 PROCEDURE — 82962 GLUCOSE BLOOD TEST: CPT

## 2022-02-08 NOTE — REASON FOR VISIT
[Follow-Up] : a follow-up visit [Home] : at home, [unfilled] , at the time of the visit. [Medical Office: ([facility])___] : at the medical office located in  [Verbal consent obtained from patient] : the patient, [unfilled]

## 2022-02-09 ENCOUNTER — APPOINTMENT (OUTPATIENT)
Dept: HYPERBARIC MEDICINE | Facility: CLINIC | Age: 57
End: 2022-02-09
Payer: MEDICAID

## 2022-02-09 ENCOUNTER — APPOINTMENT (OUTPATIENT)
Dept: HEMATOLOGY ONCOLOGY | Facility: CLINIC | Age: 57
End: 2022-02-09
Payer: MEDICAID

## 2022-02-09 ENCOUNTER — OUTPATIENT (OUTPATIENT)
Dept: OUTPATIENT SERVICES | Facility: HOSPITAL | Age: 57
LOS: 1 days | End: 2022-02-09
Payer: MEDICAID

## 2022-02-09 VITALS
RESPIRATION RATE: 16 BRPM | OXYGEN SATURATION: 98 % | TEMPERATURE: 208.76 F | HEART RATE: 100 BPM | DIASTOLIC BLOOD PRESSURE: 100 MMHG | SYSTOLIC BLOOD PRESSURE: 156 MMHG

## 2022-02-09 DIAGNOSIS — R22.1 LOCALIZED SWELLING, MASS AND LUMP, NECK: ICD-10-CM

## 2022-02-09 DIAGNOSIS — K62.9 DISEASE OF ANUS AND RECTUM, UNSPECIFIED: Chronic | ICD-10-CM

## 2022-02-09 PROCEDURE — G0277: CPT

## 2022-02-09 PROCEDURE — 82962 GLUCOSE BLOOD TEST: CPT

## 2022-02-09 PROCEDURE — 99214 OFFICE O/P EST MOD 30 MIN: CPT | Mod: 95

## 2022-02-09 PROCEDURE — 99183 HYPERBARIC OXYGEN THERAPY: CPT

## 2022-02-09 NOTE — ADDENDUM
[FreeTextEntry1] : pt was given brief intro about HBOT daily routine and how to minimize risk of Barotrauma using different methods by Technician prior to tx. \par Pt's TM evaluated by MD prior to tx.\par \par Pt's BGL low. MD notified.as per order  Pt given 23g of glucose via Apple juice prior to tx and dive for tx.\par Pt descended to 2.0 ANALIA @ 1.5 PSI/min without incident in chamber #3\par Pt resting @ depth with chest rise and fall observed throughout tx. \par Pt ascended from 2.0 ANALIA @ 1.5 PSI/min without incident. \par Pt tolerated tx well.\par \par

## 2022-02-09 NOTE — ASSESSMENT
[No change from previous assessment] : No change from previous assessment [Time MD/Provider assessed Patient:_______] : Time MD/Provider assessed Patient: [unfilled] [Patient prepared for dive] : Patient prepared for dive [Patient undergoing HBO treatment for __________] : Patient undergoing HBO treatment for [unfilled] [Patient descended without problem for 9 minutes] : Patient descended without problem for 9 minutes [No dizziness or thirst] :  No dizziness or thirst [No ear problems] : No ear problems [Vital signs stable] : Vital signs stable [Tolerating dive well] : Tolerating dive well [No Chest Pain, shortness of breath] : No Chest Pain, shortness of breath [Respiratory Rate Stable] : Respiratory Rate Stable [No chest pain, shortness of breath, or ear pain] :  No chest pain, shortness of breath, or ear pain  [Tolerated Ascent well] : Tolerated Ascent well [Vital Signs stable] : Vital Signs stable [A physician was present throughout the entire HBOT] : A physician was present throughout the entire HBOT [Yes] : Yes [Clinically Stable] : Clinically stable [Continue Treatment Plan] : Continue treatment plan

## 2022-02-09 NOTE — PROCEDURE
[Outpatient] : Outpatient [Ambulatory] : Patient is ambulatory. [THIS CHAMBER HAS BEEN CLEANED / DISINFECTED] : This chamber has been cleaned / disinfected according to local and hospital policy and procedure prior to this treatment. [Patient demonstrated and verbalized proper technique for using air break mask] : Patient demonstrated and verbalized proper technique for using air break mask [Patient educated on the risks of SMOKING prior to HBOT with understanding] : Patient educated on the risks of SMOKING prior to HBOT with understanding [Patient educated on the risks of CONSUMING ALCOHOL prior to HBOT with understanding] : Patient educated on the risks of CONSUMING ALCOHOL prior to HBOT with understanding [100% Cotton] : 100% cotton [Empty all pockets] : empty all pockets [No hair oils, wigs, hairpieces, pins] : no hair oils, wigs, hairpieces, pins  [Pre tx medications] : pre tx medications  [No make-up, creams] : no make-up, creams  [No jewelry] : no jewelry  [No matches, cigarettes, lighters] : no matches, cigarettes, lighters  [Hearing aid removed] : hearing aid removed [Dentures removed] : dentures removed [Ground bracelet on pt's wrist] : ground bracelet on pt's wrist  [Contacts removed] : contacts removed  [Remove nail polish] : remove nail polish  [No reading material] : no reading material  [Bra, undergarments removed] : bra, undergarments removed  [No contraindicated dressings] : no contraindicated dressings [Ground Wire - VISUAL Verification - Intact/Free of Obstruction] : Ground Wire - VISUAL Verification - Intact/Free of Obstruction  [Ground Continuity - Verified < 1 ohm w/ Wrist Strap Ernesto] : Ground Continuity - Verified < 1 ohm w/ Wrist Strap Ernesto [Number: ___] : Number: [unfilled] [Diagnosis: ___] : Diagnosis: [unfilled] [____] : Post-Dive: Time - [unfilled] [___] : Post-Dive: Value - [unfilled] mg/dL [Clear all fields] : clear all fields [I have examined and evaluated this patient today, including ears and lungs and have cleared the patient] : I have examined and evaluated this patient today, including ears and lungs and have cleared the patient to continue HBOT as prescribed.  [Time MD/Provider assessed Patient: _____] : Time MD/Provider assessed Patient: [unfilled] [I have ordered 1 HBOT session at the indicated protocol as seen below] : I have ordered 1 HBOT session at the indicated protocol as seen below [] : No [FreeTextEntry1] : 2.0 [FreeTextEntry3] : 90 mins [[facility]] : 7591 [facility]35 [FreeTextEntry9] : 8193 [de-identified] : [id number] [de-identified] : 110 Mins/ 4 Units [de-identified] : Md

## 2022-02-10 ENCOUNTER — APPOINTMENT (OUTPATIENT)
Dept: PAIN MANAGEMENT | Facility: CLINIC | Age: 57
End: 2022-02-10
Payer: MEDICAID

## 2022-02-10 ENCOUNTER — APPOINTMENT (OUTPATIENT)
Dept: HYPERBARIC MEDICINE | Facility: CLINIC | Age: 57
End: 2022-02-10
Payer: MEDICAID

## 2022-02-10 ENCOUNTER — OUTPATIENT (OUTPATIENT)
Dept: OUTPATIENT SERVICES | Facility: HOSPITAL | Age: 57
LOS: 1 days | End: 2022-02-10
Payer: MEDICAID

## 2022-02-10 VITALS
RESPIRATION RATE: 16 BRPM | DIASTOLIC BLOOD PRESSURE: 94 MMHG | HEART RATE: 116 BPM | SYSTOLIC BLOOD PRESSURE: 157 MMHG | TEMPERATURE: 209.48 F

## 2022-02-10 VITALS
HEART RATE: 66 BPM | OXYGEN SATURATION: 99 % | TEMPERATURE: 209.66 F | SYSTOLIC BLOOD PRESSURE: 122 MMHG | RESPIRATION RATE: 18 BRPM | DIASTOLIC BLOOD PRESSURE: 88 MMHG

## 2022-02-10 DIAGNOSIS — L59.8 OTHER SPECIFIED DISORDERS OF THE SKIN AND SUBCUTANEOUS TISSUE RELATED TO RADIATION: ICD-10-CM

## 2022-02-10 DIAGNOSIS — K62.9 DISEASE OF ANUS AND RECTUM, UNSPECIFIED: Chronic | ICD-10-CM

## 2022-02-10 PROCEDURE — 99214 OFFICE O/P EST MOD 30 MIN: CPT | Mod: 95

## 2022-02-10 PROCEDURE — 82962 GLUCOSE BLOOD TEST: CPT

## 2022-02-10 PROCEDURE — G0277: CPT

## 2022-02-10 PROCEDURE — 99183 HYPERBARIC OXYGEN THERAPY: CPT

## 2022-02-10 NOTE — DATA REVIEWED
[FreeTextEntry1] : EXAM: MR PELVIS RECTAL ANAL WAW IC\par \par PROCEDURE DATE: 11/11/2021\par \par \par IMPRESSION:\par No residual tumor, with focal scarring. No suspicious lymphadenopathy.\par Findings suspicious for active left anterior intersphincteric sinus tract, without definite skin opening. Clinical correlation is recommended.\par \par \par ---\par \par EXAM: CT ABDOMEN AND PELVIS OC IC\par \par \par PROCEDURE DATE: 01/18/2022\par \par \par \par INTERPRETATION: CLINICAL INFORMATION: Prostate cancer in 2016. Anal cancer status post treatment in 8/2021.\par \par  \par \par IMPRESSION:\par * Air and fluid in the region of the left intersphincteric space likely corresponding with intersphincteric tract present on prior MRI.\par * No evidence of metastatic disease or suspicious adenopathy in the abdomen and pelvis.\par \par \par \par ---\par \par \par EXAM: CT CHEST IC\par \par PROCEDURE DATE: 11/15/2021\par  \par IMPRESSION:\par No thoracic metastatic disease.

## 2022-02-10 NOTE — HISTORY OF PRESENT ILLNESS
[_______] : [unfilled] [8] : a current pain level of 8/10 [10] : a minimum pain level of 10/10 [7] : a maximum pain level of 7/10 [Sharp] : sharp [Burning] : burning [FreeTextEntry1] : Telehealth visit given COVID19 outbreak and precautions. \par \par Patient consented to discussing health information with myself and the MD and understands HIPAA limitations. \par \par HPI - Mr. CARL ROWLEY 56yoF referred by Dr Colon with anal cancer PMH significant for prostate cancer s/p RT in 2016, HIV (Viral load undetectable) on Biktarvy in March 2021 was seen by Dr. Avilez and underwent an exam and biopsy in April that revealed evidence of anal squamous cell carcinoma. Patient was subsequently referred for evaluation and treatment. Patient did not have full excision of the tumor rather a excisional biopsy. Main reason for which patient is referred is pain which has been an issue for him for the last 3 months. The pain is localized to the anus/rectum. It is severe, not well controlled. Worse with defecation, sitting. At its worst the pain is 10/10. Rates it as 8/10 presently. \par \par He is currently using Oxycodone 30 mg po q 4 hrs, but he states this does not adequately control the pain.  Has tried using anusol suppository without relief. \par \par Patient is , lives with his wife. He is not presently driving, uses a car service to get to medical appointments. Quit smoking tobacco about two years ago. No EtOH.  HBOT is pending insurance authorization. No active malignancy on bx from 11/26/21.\par \par Bowel movements noted to be irregular goes 3-4 days without BM at times, despite using stool softeners, lactulose\par \par \par

## 2022-02-10 NOTE — PROCEDURE
[FreeTextEntry1] : 2.0 [FreeTextEntry3] : 90 mins [FreeTextEntry5] : 1442 [facility] 0741 [FreeTextEntry9] : 8595 [de-identified] : 8958 [de-identified] : 110 Mins/ 4 Units [Outpatient] : Outpatient [Ambulatory] : Patient is ambulatory. [] : Yes [THIS CHAMBER HAS BEEN CLEANED / DISINFECTED] : This chamber has been cleaned / disinfected according to local and hospital policy and procedure prior to this treatment. [____] : Post-Dive: Time - [unfilled] [___] : Post-Dive: Value - [unfilled] mg/dL [I have examined and evaluated this patient today, including ears and lungs and have cleared the patient] : I have examined and evaluated this patient today, including ears and lungs and have cleared the patient to continue HBOT as prescribed.  [Time MD/Provider assessed Patient: _____] : Time MD/Provider assessed Patient: [unfilled] [I have ordered 1 HBOT session at the indicated protocol as seen below] : I have ordered 1 HBOT session at the indicated protocol as seen below [Patient demonstrated and verbalized proper technique for using air break mask] : Patient demonstrated and verbalized proper technique for using air break mask [Patient educated on the risks of SMOKING prior to HBOT with understanding] : Patient educated on the risks of SMOKING prior to HBOT with understanding [Patient educated on the risks of CONSUMING ALCOHOL prior to HBOT with understanding] : Patient educated on the risks of CONSUMING ALCOHOL prior to HBOT with understanding [100% Cotton] : 100% cotton [Empty all pockets] : empty all pockets [No hair oils, wigs, hairpieces, pins] : no hair oils, wigs, hairpieces, pins  [Pre tx medications] : pre tx medications  [No make-up, creams] : no make-up, creams  [No jewelry] : no jewelry  [No matches, cigarettes, lighters] : no matches, cigarettes, lighters  [Hearing aid removed] : hearing aid removed [Dentures removed] : dentures removed [Ground bracelet on pt's wrist] : ground bracelet on pt's wrist  [Contacts removed] : contacts removed  [Remove nail polish] : remove nail polish  [No reading material] : no reading material  [Bra, undergarments removed] : bra, undergarments removed  [No contraindicated dressings] : no contraindicated dressings [Ground Wire - VISUAL Verification - Intact/Free of Obstruction] : Ground Wire - VISUAL Verification - Intact/Free of Obstruction  [Ground Continuity - Verified < 1 ohm w/ Wrist Strap Ernesto] : Ground Continuity - Verified < 1 ohm w/ Wrist Strap Ernesto [Clear all fields] : clear all fields [Number: ___] : Number: [unfilled] [Diagnosis: ___] : Diagnosis: [unfilled]

## 2022-02-10 NOTE — ASSESSMENT
[FreeTextEntry1] : Mr. CARL ROWLEY is a 56 year M suffering from perianal and rectal pain, that based upon today's subjective complaints, physical examination, and imaging review, is likely secondary to anal cancer\par \par >> Medications\par \par Continue with oral analgesic regimen in place\par \par Trial Gabapentin 300 mg po qhs\par \par Relistor 150 mg po take 3 tablets orally 30 minutes before breakfast for opiate induced constipation\par  \par >> Interventions\par  \par Candidate for Ganglion of Impar block under fluoroscopic guidance. Success rate and possible complications discussed with patient in detail.  \par \par >> Therapy and Other Modalities\par \par Recommended foam donut cushion\par  \par >> Imaging and Other Studies\par \par I have personally reviewed the images in detail together with the patient today, and I have answered all questions regarding this condition to the best of my ability, to the patient's satisfaction. \par \par >> Consults\par \par None ordered\par \par Verbal consent was given by/on:Patient 2/10/22\par \par Patient location: Home\par Physician location: Office\par Reason for Telehealth visit: Unable to travel at this time\par This service took place using a two way audio and visual platform. The patient and Dr. Felipe were both able to see each other and communicate through video. There were no barriers to communication. Greater then 50% of the time spent in the encounter involved counseling and coordination of care. \par \par Time spent on visit: 30 minutes

## 2022-02-10 NOTE — PHYSICAL EXAM
Patient was poked in right eye at [phone] today. Redness noted to eye and reports vision \"looks white. \" Denies LOC.
[de-identified] : Physical Exam (Telemedicine): \par Gen: AAOX3, NAD\par HEENT: PERRLA, EOMI, NCAT, NP\par Pulmonary: No Signs of Respiratory Distress\par MSK: AROM WFL, Limitations none\par Neurological: Grossly neurologically intact, Noted deficits none\par Gait: Normal, Non-antalgic, Sans AD\par Derm: No Rash, (-)Lesions, (-)Erythema, (-)Cyanosis \par Psych: Calm, Cooperative, Conversational\par \par Disclaimer: This is a limited examination for the purposes of conducting a telemedicine visit during the COVID-19 pandemic. Physical exam maneuvers were modified as necessary to allow patient to self perform. A focused physician physical examination will be performed during in person visits and should be referred to to determine need for further testing, interventions or degree of disability.

## 2022-02-10 NOTE — CONSULT LETTER
[Dear  ___] : Dear  [unfilled], [Consult Letter:] : I had the pleasure of evaluating your patient, [unfilled]. [Please see my note below.] : Please see my note below. [Consult Closing:] : Thank you very much for allowing me to participate in the care of this patient.  If you have any questions, please do not hesitate to contact me. [Sincerely,] : Sincerely, [FreeTextEntry3] : Wagner Felipe DO

## 2022-02-10 NOTE — ADDENDUM
[FreeTextEntry1] : Pt descended to 2.0 ANALIA @ 1.5 PSI/min without incident in chamber #3\par Pt resting @ depth with chest rise and fall observed throughout tx. \par Pt ascended from 2.0 ANALIA @ 1.5 PSI/min without incident. \par Pt tolerated tx well.\par \par

## 2022-02-11 ENCOUNTER — APPOINTMENT (OUTPATIENT)
Dept: HYPERBARIC MEDICINE | Facility: CLINIC | Age: 57
End: 2022-02-11
Payer: MEDICAID

## 2022-02-11 ENCOUNTER — OUTPATIENT (OUTPATIENT)
Dept: OUTPATIENT SERVICES | Facility: HOSPITAL | Age: 57
LOS: 1 days | End: 2022-02-11
Payer: MEDICAID

## 2022-02-11 VITALS
RESPIRATION RATE: 18 BRPM | HEART RATE: 76 BPM | DIASTOLIC BLOOD PRESSURE: 78 MMHG | TEMPERATURE: 209.66 F | OXYGEN SATURATION: 98 % | SYSTOLIC BLOOD PRESSURE: 162 MMHG

## 2022-02-11 VITALS
DIASTOLIC BLOOD PRESSURE: 88 MMHG | SYSTOLIC BLOOD PRESSURE: 157 MMHG | HEART RATE: 100 BPM | TEMPERATURE: 209.66 F | RESPIRATION RATE: 18 BRPM | OXYGEN SATURATION: 99 %

## 2022-02-11 VITALS
SYSTOLIC BLOOD PRESSURE: 155 MMHG | RESPIRATION RATE: 18 BRPM | OXYGEN SATURATION: 98 % | HEART RATE: 88 BPM | DIASTOLIC BLOOD PRESSURE: 78 MMHG | TEMPERATURE: 209.66 F

## 2022-02-11 DIAGNOSIS — K62.9 DISEASE OF ANUS AND RECTUM, UNSPECIFIED: Chronic | ICD-10-CM

## 2022-02-11 PROBLEM — R22.1 THROAT SWELLING: Status: ACTIVE | Noted: 2022-02-11

## 2022-02-11 PROCEDURE — 82962 GLUCOSE BLOOD TEST: CPT

## 2022-02-11 PROCEDURE — 99183 HYPERBARIC OXYGEN THERAPY: CPT

## 2022-02-11 PROCEDURE — G0277: CPT

## 2022-02-11 NOTE — HISTORY OF PRESENT ILLNESS
[FreeTextEntry1] : 56yoF with anal cancer presents for follow-up palliative care visit, referred by Oncology.  \par PMH significant for prostate cancer s/p RT in 2016, HIV on Biktarvy.\par \par In 2020 at age of 55 patient had his screening colonoscopy performed by Dr. Lawrence in June 2020 that as per patient was informed to be within normal limits. Subsequently patient had felt a bump in the perirectal area and at the time was told by GI that this could be a hemorrhoid and was treated as such.\par His symptoms did improve and was eventually referred to Dr. Avilez for further evaluation and in March 2021 patient was seen by Dr. Avilez and underwent an exam and biopsy in April that revealed evidence of anal squamous cell carcinoma. Patient was subsequently referred for evaluation and treatment. Patient did not have full excision of the tumor rather a excisional biopsy.\par \par Main reason for which patient is referred is pain which has been an issue for him for the last 3 months. The pain is localized to the anus/rectum. It is severe, not well controlled. Worse with defecation, sitting. At its worst the pain is 10/10. Rates it as 8/10 presently. \par \par He is currently using Oxycodone IR 20mg PRN, takes this every 6 hours. He was previously tried on transdermal fentanyl 25mcg/hr and oxycontin but he states that neither helped his pain. He no longer uses either. \par Has tried using anusol suppository without relief. \par \par Underwent EUA by Dr. Avilez on 11/26/21 for evaluation of persistent kandy-anal pain and a 4 x 4 cm ulcer was found but no fissure or fistula. A biopsy was performed and was negative for dysplasia or malignancy.  \par \par Interval History:\par Patient presents for follow-up, seen via TeleMedicine.  No active malignancy on bx from 11/26/21.  He started HBOT yesterday. \par \par Rectal pain remains severe. He is now on MS ER 30/30/45mg and continues to report minimal improvement.  The pain continues to necessitate the use of PRN oxycodone IR 30mg q4h ATC. Pain gets as high as 10/10, as low as 4/10 for approximately 3 hours after PRN use. He states that pain is worse with BM's and with standing for extended periods of time. He also performs Sitz bathes 2-3 times a day which provide relief while in the bath only. \par \par Constipation has been an issue. He is requiring Lactulose QD. \par \par ROS:\par + appetite is good\par + hot flushes from prostate ca therapy\par +uses colace, dulcolax, prune juice daily and PRN Lactulose daily\par +anxiety d/o - uses alprazolam 1mg PRN, olanzapine 10mg QHS\par +insomnia - is on long-standing ambien 10mg QHS \par Denies nausea/vomiting.\par All other ROS as outlined or noncontributory. \par \par Patient is , lives with his wife. \par He is not presently driving, uses a car service to get to medical appointments. \par Quit smoking tobacco about two years ago. No EtOH. \par Passes time by watching TV. \par \par [facility]par Urologist: Dr. Jung \par \par I-Stop Ref#: [id number] no

## 2022-02-11 NOTE — ASSESSMENT
[FreeTextEntry1] : 56yoM with:\par \par 1. Anal Cancer - s/p 5400cGy/ 15 fractions completed on 8/16/21 with Mitomycin Day #1. Xeloda discontinued 2/2 GI issues. Follow up with Med Onc, Rad Onc, wound care. \par \par 2. Rectal pain, acute - receiving HBOT.\par -  Given sustained ATC IR opioid requirements, will increase MS ER to 45mg TID. Given worsening pain, may increase Oxycodone IR to 30mg q4h temporarily and until treatment is started.   If no improvement, will consider short acting opioid rotation.\par - Telepain consult previously requested for evaluation of nerve block. Patient is now in agreement to proceed.\par \par 3. Anxiety d/o - Follows closely with psychiatry. \par \par 4. Opioid-induced constipation -  C/w Lactulose daily. \par \par 5. Encounter for Palliative Care - Emotional support provided.\par \par Follow up in 2 weeks, call sooner with questions or issues.

## 2022-02-14 ENCOUNTER — APPOINTMENT (OUTPATIENT)
Dept: HYPERBARIC MEDICINE | Facility: CLINIC | Age: 57
End: 2022-02-14
Payer: MEDICAID

## 2022-02-14 ENCOUNTER — OUTPATIENT (OUTPATIENT)
Dept: OUTPATIENT SERVICES | Facility: HOSPITAL | Age: 57
LOS: 1 days | End: 2022-02-14
Payer: MEDICAID

## 2022-02-14 VITALS
RESPIRATION RATE: 18 BRPM | OXYGEN SATURATION: 99 % | SYSTOLIC BLOOD PRESSURE: 158 MMHG | TEMPERATURE: 209.12 F | DIASTOLIC BLOOD PRESSURE: 88 MMHG | HEART RATE: 78 BPM

## 2022-02-14 VITALS
TEMPERATURE: 208.76 F | RESPIRATION RATE: 18 BRPM | SYSTOLIC BLOOD PRESSURE: 170 MMHG | HEART RATE: 76 BPM | DIASTOLIC BLOOD PRESSURE: 88 MMHG | OXYGEN SATURATION: 99 %

## 2022-02-14 DIAGNOSIS — K62.9 DISEASE OF ANUS AND RECTUM, UNSPECIFIED: Chronic | ICD-10-CM

## 2022-02-14 PROCEDURE — G0277: CPT

## 2022-02-14 PROCEDURE — 82962 GLUCOSE BLOOD TEST: CPT

## 2022-02-14 PROCEDURE — 99183 HYPERBARIC OXYGEN THERAPY: CPT

## 2022-02-14 NOTE — PROCEDURE
[Outpatient] : Outpatient [Ambulatory] : Patient is ambulatory. [THIS CHAMBER HAS BEEN CLEANED / DISINFECTED] : This chamber has been cleaned / disinfected according to local and hospital policy and procedure prior to this treatment. [____] : Post-Dive: Time - [unfilled] [___] : Post-Dive: Value - [unfilled] mg/dL [I have examined and evaluated this patient today, including ears and lungs and have cleared the patient] : I have examined and evaluated this patient today, including ears and lungs and have cleared the patient to continue HBOT as prescribed.  [Time MD/Provider assessed Patient: _____] : Time MD/Provider assessed Patient: [unfilled] [I have ordered 1 HBOT session at the indicated protocol as seen below] : I have ordered 1 HBOT session at the indicated protocol as seen below [Patient demonstrated and verbalized proper technique for using air break mask] : Patient demonstrated and verbalized proper technique for using air break mask [Patient educated on the risks of SMOKING prior to HBOT with understanding] : Patient educated on the risks of SMOKING prior to HBOT with understanding [Patient educated on the risks of CONSUMING ALCOHOL prior to HBOT with understanding] : Patient educated on the risks of CONSUMING ALCOHOL prior to HBOT with understanding [100% Cotton] : 100% cotton [Empty all pockets] : empty all pockets [No hair oils, wigs, hairpieces, pins] : no hair oils, wigs, hairpieces, pins  [Pre tx medications] : pre tx medications  [No make-up, creams] : no make-up, creams  [No jewelry] : no jewelry  [No matches, cigarettes, lighters] : no matches, cigarettes, lighters  [Hearing aid removed] : hearing aid removed [Dentures removed] : dentures removed [Ground bracelet on pt's wrist] : ground bracelet on pt's wrist  [Contacts removed] : contacts removed  [Remove nail polish] : remove nail polish  [No reading material] : no reading material  [Bra, undergarments removed] : bra, undergarments removed  [No contraindicated dressings] : no contraindicated dressings [Ground Wire - VISUAL Verification - Intact/Free of Obstruction] : Ground Wire - VISUAL Verification - Intact/Free of Obstruction  [Ground Continuity - Verified < 1 ohm w/ Wrist Strap Ernesto] : Ground Continuity - Verified < 1 ohm w/ Wrist Strap Ernesto [Diagnosis: ___] : Diagnosis: [unfilled] [Number: ___] : Number: [unfilled] [Clear all fields] : clear all fields [] : No [FreeTextEntry1] : 2.0 [FreeTextEntry3] : 90 mins [FreeTextEntry5] : 9236 [FreeTextEntry7] : 9886 [FreeTextEntry9] : 7803 [de-identified] : 1546 [de-identified] : 110 Mins/ 4 Units

## 2022-02-14 NOTE — PROCEDURE
[Outpatient] : Outpatient [Ambulatory] : Patient is ambulatory. [THIS CHAMBER HAS BEEN CLEANED / DISINFECTED] : This chamber has been cleaned / disinfected according to local and hospital policy and procedure prior to this treatment. [____] : Post-Dive: Time - [unfilled] [___] : Post-Dive: Value - [unfilled] mg/dL [I have examined and evaluated this patient today, including ears and lungs and have cleared the patient] : I have examined and evaluated this patient today, including ears and lungs and have cleared the patient to continue HBOT as prescribed.  [Time MD/Provider assessed Patient: _____] : Time MD/Provider assessed Patient: [unfilled] [I have ordered 1 HBOT session at the indicated protocol as seen below] : I have ordered 1 HBOT session at the indicated protocol as seen below [Patient demonstrated and verbalized proper technique for using air break mask] : Patient demonstrated and verbalized proper technique for using air break mask [Patient educated on the risks of SMOKING prior to HBOT with understanding] : Patient educated on the risks of SMOKING prior to HBOT with understanding [Patient educated on the risks of CONSUMING ALCOHOL prior to HBOT with understanding] : Patient educated on the risks of CONSUMING ALCOHOL prior to HBOT with understanding [100% Cotton] : 100% cotton [Empty all pockets] : empty all pockets [No hair oils, wigs, hairpieces, pins] : no hair oils, wigs, hairpieces, pins  [Pre tx medications] : pre tx medications  [No make-up, creams] : no make-up, creams  [No jewelry] : no jewelry  [No matches, cigarettes, lighters] : no matches, cigarettes, lighters  [Hearing aid removed] : hearing aid removed [Dentures removed] : dentures removed [Ground bracelet on pt's wrist] : ground bracelet on pt's wrist  [Contacts removed] : contacts removed  [Remove nail polish] : remove nail polish  [No reading material] : no reading material  [Bra, undergarments removed] : bra, undergarments removed  [No contraindicated dressings] : no contraindicated dressings [Ground Continuity - Verified < 1 ohm w/ Wrist Strap Ernesto] : Ground Continuity - Verified < 1 ohm w/ Wrist Strap Ernesto [Ground Wire - VISUAL Verification - Intact/Free of Obstruction] : Ground Wire - VISUAL Verification - Intact/Free of Obstruction  [Clear all fields] : clear all fields [Diagnosis: ___] : Diagnosis: [unfilled] [Number: ___] : Number: [unfilled] [] : No [FreeTextEntry1] : 2.0 [FreeTextEntry3] : 90 Mins [FreeTextEntry5] : 7930 [FreeTextEntry7] : 7237 [de-identified] : 1542 [FreeTextEntry9] : 5027 [de-identified] : 110 Mins/ 4 Units

## 2022-02-14 NOTE — PROCEDURE
[Outpatient] : Outpatient [Ambulatory] : Patient is ambulatory. [THIS CHAMBER HAS BEEN CLEANED / DISINFECTED] : This chamber has been cleaned / disinfected according to local and hospital policy and procedure prior to this treatment. [____] : Post-Dive: Time - [unfilled] [___] : Post-Dive: Value - [unfilled] mg/dL [Patient demonstrated and verbalized proper technique for using air break mask] : Patient demonstrated and verbalized proper technique for using air break mask [Patient educated on the risks of SMOKING prior to HBOT with understanding] : Patient educated on the risks of SMOKING prior to HBOT with understanding [Patient educated on the risks of CONSUMING ALCOHOL prior to HBOT with understanding] : Patient educated on the risks of CONSUMING ALCOHOL prior to HBOT with understanding [100% Cotton] : 100% cotton [Empty all pockets] : empty all pockets [No hair oils, wigs, hairpieces, pins] : no hair oils, wigs, hairpieces, pins  [Pre tx medications] : pre tx medications  [No make-up, creams] : no make-up, creams  [No jewelry] : no jewelry  [No matches, cigarettes, lighters] : no matches, cigarettes, lighters  [Hearing aid removed] : hearing aid removed [Dentures removed] : dentures removed [Ground bracelet on pt's wrist] : ground bracelet on pt's wrist  [Contacts removed] : contacts removed  [Remove nail polish] : remove nail polish  [No reading material] : no reading material  [Bra, undergarments removed] : bra, undergarments removed  [No contraindicated dressings] : no contraindicated dressings [Ground Wire - VISUAL Verification - Intact/Free of Obstruction] : Ground Wire - VISUAL Verification - Intact/Free of Obstruction  [Ground Continuity - Verified < 1 ohm w/ Wrist Strap Ernesto] : Ground Continuity - Verified < 1 ohm w/ Wrist Strap Ernesto [Clear all fields] : clear all fields [Diagnosis: ___] : Diagnosis: [unfilled] [Number: ___] : Number: [unfilled] [I have examined and evaluated this patient today, including ears and lungs and have cleared the patient] : I have examined and evaluated this patient today, including ears and lungs and have cleared the patient to continue HBOT as prescribed.  [Time MD/Provider assessed Patient: _____] : Time MD/Provider assessed Patient: [unfilled] [I have ordered 1 HBOT session at the indicated protocol as seen below] : I have ordered 1 HBOT session at the indicated protocol as seen below [] : No [FreeTextEntry1] : 2.0 [FreeTextEntry3] : 90 mins [FreeTextEntry5] : 3794 [facility] : 2389 [FreeTextEntry9] : 8428 [de-identified] : 2127 [de-identified] : 110 Mins/ 4 Units

## 2022-02-14 NOTE — ADDENDUM
[FreeTextEntry1] : Pt's BGL low. MD notified.as per order  Pt given 23g of glucose via Apple juice prior to tx and dive for tx.\par \par Pt descended to 2.0 ANALIA @ 1.5 PSI/min without incident in chamber #3\par Pt resting @ depth with chest rise and fall observed throughout tx. \par Pt ascended from 2.0 ANALIA @ 1.5 PSI/min without incident. \par Pt tolerated tx well.\par \par

## 2022-02-15 ENCOUNTER — OUTPATIENT (OUTPATIENT)
Dept: OUTPATIENT SERVICES | Facility: HOSPITAL | Age: 57
LOS: 1 days | End: 2022-02-15
Payer: MEDICAID

## 2022-02-15 ENCOUNTER — APPOINTMENT (OUTPATIENT)
Dept: HYPERBARIC MEDICINE | Facility: CLINIC | Age: 57
End: 2022-02-15
Payer: MEDICAID

## 2022-02-15 VITALS
HEART RATE: 98 BPM | TEMPERATURE: 209.66 F | OXYGEN SATURATION: 98 % | SYSTOLIC BLOOD PRESSURE: 162 MMHG | DIASTOLIC BLOOD PRESSURE: 88 MMHG | RESPIRATION RATE: 18 BRPM

## 2022-02-15 VITALS
HEART RATE: 100 BPM | DIASTOLIC BLOOD PRESSURE: 88 MMHG | OXYGEN SATURATION: 99 % | RESPIRATION RATE: 18 BRPM | SYSTOLIC BLOOD PRESSURE: 158 MMHG | TEMPERATURE: 209.66 F

## 2022-02-15 DIAGNOSIS — K62.9 DISEASE OF ANUS AND RECTUM, UNSPECIFIED: Chronic | ICD-10-CM

## 2022-02-15 PROCEDURE — G0277: CPT

## 2022-02-15 PROCEDURE — 82962 GLUCOSE BLOOD TEST: CPT

## 2022-02-15 PROCEDURE — 99183 HYPERBARIC OXYGEN THERAPY: CPT

## 2022-02-15 NOTE — ADDENDUM
[FreeTextEntry1] : .Pt descended to 2.0 ANALIA @ 1.5 PSI/min without incident in chamber #3\par Pt resting @ depth with chest rise and fall observed throughout tx. \par Pt ascended from 2.0 ANALIA @ 1.5 PSI/min without incident. \par Pt tolerated tx well.\par \par  Yes

## 2022-02-15 NOTE — PROCEDURE
[Outpatient] : Outpatient [Ambulatory] : Patient is ambulatory. [THIS CHAMBER HAS BEEN CLEANED / DISINFECTED] : This chamber has been cleaned / disinfected according to local and hospital policy and procedure prior to this treatment. [____] : Post-Dive: Time - [unfilled] [___] : Post-Dive: Value - [unfilled] mg/dL [I have examined and evaluated this patient today, including ears and lungs and have cleared the patient] : I have examined and evaluated this patient today, including ears and lungs and have cleared the patient to continue HBOT as prescribed.  [Time MD/Provider assessed Patient: _____] : Time MD/Provider assessed Patient: [unfilled] [I have ordered 1 HBOT session at the indicated protocol as seen below] : I have ordered 1 HBOT session at the indicated protocol as seen below [Patient demonstrated and verbalized proper technique for using air break mask] : Patient demonstrated and verbalized proper technique for using air break mask [Patient educated on the risks of SMOKING prior to HBOT with understanding] : Patient educated on the risks of SMOKING prior to HBOT with understanding [Patient educated on the risks of CONSUMING ALCOHOL prior to HBOT with understanding] : Patient educated on the risks of CONSUMING ALCOHOL prior to HBOT with understanding [100% Cotton] : 100% cotton [Empty all pockets] : empty all pockets [No hair oils, wigs, hairpieces, pins] : no hair oils, wigs, hairpieces, pins  [Pre tx medications] : pre tx medications  [No make-up, creams] : no make-up, creams  [No jewelry] : no jewelry  [No matches, cigarettes, lighters] : no matches, cigarettes, lighters  [Hearing aid removed] : hearing aid removed [Dentures removed] : dentures removed [Ground bracelet on pt's wrist] : ground bracelet on pt's wrist  [Contacts removed] : contacts removed  [Remove nail polish] : remove nail polish  [No reading material] : no reading material  [Bra, undergarments removed] : bra, undergarments removed  [No contraindicated dressings] : no contraindicated dressings [Ground Wire - VISUAL Verification - Intact/Free of Obstruction] : Ground Wire - VISUAL Verification - Intact/Free of Obstruction  [Ground Continuity - Verified < 1 ohm w/ Wrist Strap Ernesto] : Ground Continuity - Verified < 1 ohm w/ Wrist Strap Ernesto [Clear all fields] : clear all fields [Number: ___] : Number: [unfilled] [Diagnosis: ___] : Diagnosis: [unfilled] [] : No [FreeTextEntry1] : 2.0 [FreeTextEntry3] : 90 mins [FreeTextEntry5] : 1359 [facility] : 4499 [FreeTextEntry9] : 4357 [de-identified] : 0396 [de-identified] : 110 Mins/ 4 Units

## 2022-02-15 NOTE — PROCEDURE
[Outpatient] : Outpatient [Ambulatory] : Patient is ambulatory. [THIS CHAMBER HAS BEEN CLEANED / DISINFECTED] : This chamber has been cleaned / disinfected according to local and hospital policy and procedure prior to this treatment. [____] : Post-Dive: Time - [unfilled] [___] : Post-Dive: Value - [unfilled] mg/dL [I have examined and evaluated this patient today, including ears and lungs and have cleared the patient] : I have examined and evaluated this patient today, including ears and lungs and have cleared the patient to continue HBOT as prescribed.  [Time MD/Provider assessed Patient: _____] : Time MD/Provider assessed Patient: [unfilled] [I have ordered 1 HBOT session at the indicated protocol as seen below] : I have ordered 1 HBOT session at the indicated protocol as seen below [Patient demonstrated and verbalized proper technique for using air break mask] : Patient demonstrated and verbalized proper technique for using air break mask [Patient educated on the risks of SMOKING prior to HBOT with understanding] : Patient educated on the risks of SMOKING prior to HBOT with understanding [Patient educated on the risks of CONSUMING ALCOHOL prior to HBOT with understanding] : Patient educated on the risks of CONSUMING ALCOHOL prior to HBOT with understanding [100% Cotton] : 100% cotton [Empty all pockets] : empty all pockets [No hair oils, wigs, hairpieces, pins] : no hair oils, wigs, hairpieces, pins  [Pre tx medications] : pre tx medications  [No make-up, creams] : no make-up, creams  [No jewelry] : no jewelry  [No matches, cigarettes, lighters] : no matches, cigarettes, lighters  [Hearing aid removed] : hearing aid removed [Dentures removed] : dentures removed [Ground bracelet on pt's wrist] : ground bracelet on pt's wrist  [Contacts removed] : contacts removed  [Remove nail polish] : remove nail polish  [No reading material] : no reading material  [Bra, undergarments removed] : bra, undergarments removed  [No contraindicated dressings] : no contraindicated dressings [Ground Wire - VISUAL Verification - Intact/Free of Obstruction] : Ground Wire - VISUAL Verification - Intact/Free of Obstruction  [Ground Continuity - Verified < 1 ohm w/ Wrist Strap Ernesto] : Ground Continuity - Verified < 1 ohm w/ Wrist Strap Ernesto [Number: ___] : Number: [unfilled] [Diagnosis: ___] : Diagnosis: [unfilled] [Clear all fields] : clear all fields [] : No [FreeTextEntry1] : 2.0 [FreeTextEntry3] : 90 Mins [FreeTextEntry5] : 9214 [FreeTextEntry7] : 0844 [FreeTextEntry9] : 7304 [de-identified] : 1549 [de-identified] : 110 Mins/ 4 Units

## 2022-02-15 NOTE — PROCEDURE
[Outpatient] : Outpatient [Ambulatory] : Patient is ambulatory. [THIS CHAMBER HAS BEEN CLEANED / DISINFECTED] : This chamber has been cleaned / disinfected according to local and hospital policy and procedure prior to this treatment. [____] : Post-Dive: Time - [unfilled] [___] : Post-Dive: Value - [unfilled] mg/dL [I have examined and evaluated this patient today, including ears and lungs and have cleared the patient] : I have examined and evaluated this patient today, including ears and lungs and have cleared the patient to continue HBOT as prescribed.  [Time MD/Provider assessed Patient: _____] : Time MD/Provider assessed Patient: [unfilled] [I have ordered 1 HBOT session at the indicated protocol as seen below] : I have ordered 1 HBOT session at the indicated protocol as seen below [Patient demonstrated and verbalized proper technique for using air break mask] : Patient demonstrated and verbalized proper technique for using air break mask [Patient educated on the risks of SMOKING prior to HBOT with understanding] : Patient educated on the risks of SMOKING prior to HBOT with understanding [Patient educated on the risks of CONSUMING ALCOHOL prior to HBOT with understanding] : Patient educated on the risks of CONSUMING ALCOHOL prior to HBOT with understanding [100% Cotton] : 100% cotton [Empty all pockets] : empty all pockets [No hair oils, wigs, hairpieces, pins] : no hair oils, wigs, hairpieces, pins  [Pre tx medications] : pre tx medications  [No make-up, creams] : no make-up, creams  [No jewelry] : no jewelry  [No matches, cigarettes, lighters] : no matches, cigarettes, lighters  [Hearing aid removed] : hearing aid removed [Dentures removed] : dentures removed [Ground bracelet on pt's wrist] : ground bracelet on pt's wrist  [Contacts removed] : contacts removed  [Remove nail polish] : remove nail polish  [No reading material] : no reading material  [Bra, undergarments removed] : bra, undergarments removed  [No contraindicated dressings] : no contraindicated dressings [Ground Wire - VISUAL Verification - Intact/Free of Obstruction] : Ground Wire - VISUAL Verification - Intact/Free of Obstruction  [Ground Continuity - Verified < 1 ohm w/ Wrist Strap Ernesto] : Ground Continuity - Verified < 1 ohm w/ Wrist Strap Ernesto [Clear all fields] : clear all fields [Number: ___] : Number: [unfilled] [Diagnosis: ___] : Diagnosis: [unfilled] [] : No [FreeTextEntry1] : 2.0 [FreeTextEntry3] : 90 mins [FreeTextEntry5] : 0660 [facility] : 0960 [FreeTextEntry9] : 1116 [de-identified] : 7019 [de-identified] : 110 Mins/ 4 Units

## 2022-02-16 ENCOUNTER — OUTPATIENT (OUTPATIENT)
Dept: OUTPATIENT SERVICES | Facility: HOSPITAL | Age: 57
LOS: 1 days | End: 2022-02-16
Payer: MEDICAID

## 2022-02-16 ENCOUNTER — APPOINTMENT (OUTPATIENT)
Dept: HYPERBARIC MEDICINE | Facility: CLINIC | Age: 57
End: 2022-02-16
Payer: MEDICAID

## 2022-02-16 VITALS
DIASTOLIC BLOOD PRESSURE: 88 MMHG | TEMPERATURE: 209.66 F | HEART RATE: 88 BPM | RESPIRATION RATE: 18 BRPM | SYSTOLIC BLOOD PRESSURE: 156 MMHG | OXYGEN SATURATION: 99 %

## 2022-02-16 VITALS
TEMPERATURE: 206.96 F | SYSTOLIC BLOOD PRESSURE: 136 MMHG | HEART RATE: 88 BPM | DIASTOLIC BLOOD PRESSURE: 88 MMHG | RESPIRATION RATE: 16 BRPM | OXYGEN SATURATION: 98 %

## 2022-02-16 DIAGNOSIS — K62.9 DISEASE OF ANUS AND RECTUM, UNSPECIFIED: Chronic | ICD-10-CM

## 2022-02-16 DIAGNOSIS — M79.673 PAIN IN UNSPECIFIED FOOT: ICD-10-CM

## 2022-02-16 PROCEDURE — G0277: CPT

## 2022-02-16 PROCEDURE — 82962 GLUCOSE BLOOD TEST: CPT

## 2022-02-16 PROCEDURE — 99183 HYPERBARIC OXYGEN THERAPY: CPT

## 2022-02-16 NOTE — ADDENDUM
[FreeTextEntry1] : .Pt descended to 2.0 ANALIA @ 1.5 PSI/min without incident in chamber #3\par Pt resting @ depth with chest rise and fall observed throughout tx. \par 58 mins into tx pt notified he has to use bathroom and would like to come out. NP notified. as per pt's request pt Pt ascended from 2.0 ANALIA @ 1.5 PSI/min .\par \par \par

## 2022-02-16 NOTE — PROCEDURE
[Outpatient] : Outpatient [Ambulatory] : Patient is ambulatory. [THIS CHAMBER HAS BEEN CLEANED / DISINFECTED] : This chamber has been cleaned / disinfected according to local and hospital policy and procedure prior to this treatment. [____] : Post-Dive: Time - [unfilled] [___] : Post-Dive: Value - [unfilled] mg/dL [I have examined and evaluated this patient today, including ears and lungs and have cleared the patient] : I have examined and evaluated this patient today, including ears and lungs and have cleared the patient to continue HBOT as prescribed.  [Time MD/Provider assessed Patient: _____] : Time MD/Provider assessed Patient: [unfilled] [I have ordered 1 HBOT session at the indicated protocol as seen below] : I have ordered 1 HBOT session at the indicated protocol as seen below [Patient demonstrated and verbalized proper technique for using air break mask] : Patient demonstrated and verbalized proper technique for using air break mask [Patient educated on the risks of SMOKING prior to HBOT with understanding] : Patient educated on the risks of SMOKING prior to HBOT with understanding [Patient educated on the risks of CONSUMING ALCOHOL prior to HBOT with understanding] : Patient educated on the risks of CONSUMING ALCOHOL prior to HBOT with understanding [100% Cotton] : 100% cotton [Empty all pockets] : empty all pockets [No hair oils, wigs, hairpieces, pins] : no hair oils, wigs, hairpieces, pins  [Pre tx medications] : pre tx medications  [No make-up, creams] : no make-up, creams  [No jewelry] : no jewelry  [No matches, cigarettes, lighters] : no matches, cigarettes, lighters  [Hearing aid removed] : hearing aid removed [Dentures removed] : dentures removed [Ground bracelet on pt's wrist] : ground bracelet on pt's wrist  [Contacts removed] : contacts removed  [Remove nail polish] : remove nail polish  [No reading material] : no reading material  [Bra, undergarments removed] : bra, undergarments removed  [No contraindicated dressings] : no contraindicated dressings [Ground Wire - VISUAL Verification - Intact/Free of Obstruction] : Ground Wire - VISUAL Verification - Intact/Free of Obstruction  [Ground Continuity - Verified < 1 ohm w/ Wrist Strap Ernesto] : Ground Continuity - Verified < 1 ohm w/ Wrist Strap Ernesto [Clear all fields] : clear all fields [Diagnosis: ___] : Diagnosis: [unfilled] [Number: ___] : Number: [unfilled] [] : No [FreeTextEntry1] : 2.0 [FreeTextEntry3] : 58 Mins [FreeTextEntry5] : 8083 [FreeTextEntry7] : 7424 [FreeTextEntry9] : 8262 [de-identified] : 6046 [de-identified] : 78  Mins/  3Units

## 2022-02-17 ENCOUNTER — OUTPATIENT (OUTPATIENT)
Dept: OUTPATIENT SERVICES | Facility: HOSPITAL | Age: 57
LOS: 1 days | End: 2022-02-17
Payer: MEDICAID

## 2022-02-17 ENCOUNTER — APPOINTMENT (OUTPATIENT)
Dept: HYPERBARIC MEDICINE | Facility: CLINIC | Age: 57
End: 2022-02-17
Payer: MEDICAID

## 2022-02-17 VITALS
SYSTOLIC BLOOD PRESSURE: 160 MMHG | TEMPERATURE: 208.94 F | OXYGEN SATURATION: 99 % | RESPIRATION RATE: 18 BRPM | DIASTOLIC BLOOD PRESSURE: 88 MMHG | HEART RATE: 102 BPM

## 2022-02-17 VITALS
TEMPERATURE: 208.58 F | HEART RATE: 106 BPM | DIASTOLIC BLOOD PRESSURE: 80 MMHG | SYSTOLIC BLOOD PRESSURE: 156 MMHG | OXYGEN SATURATION: 99 % | RESPIRATION RATE: 18 BRPM

## 2022-02-17 DIAGNOSIS — L59.8 OTHER SPECIFIED DISORDERS OF THE SKIN AND SUBCUTANEOUS TISSUE RELATED TO RADIATION: ICD-10-CM

## 2022-02-17 DIAGNOSIS — K62.9 DISEASE OF ANUS AND RECTUM, UNSPECIFIED: Chronic | ICD-10-CM

## 2022-02-17 PROCEDURE — G0277: CPT

## 2022-02-17 PROCEDURE — 82962 GLUCOSE BLOOD TEST: CPT

## 2022-02-17 PROCEDURE — 99183 HYPERBARIC OXYGEN THERAPY: CPT

## 2022-02-17 NOTE — PROCEDURE
[Outpatient] : Outpatient [Ambulatory] : Patient is ambulatory. [THIS CHAMBER HAS BEEN CLEANED / DISINFECTED] : This chamber has been cleaned / disinfected according to local and hospital policy and procedure prior to this treatment. [____] : Post-Dive: Time - [unfilled] [___] : Post-Dive: Value - [unfilled] mg/dL [I have examined and evaluated this patient today, including ears and lungs and have cleared the patient] : I have examined and evaluated this patient today, including ears and lungs and have cleared the patient to continue HBOT as prescribed.  [Time MD/Provider assessed Patient: _____] : Time MD/Provider assessed Patient: [unfilled] [I have ordered 1 HBOT session at the indicated protocol as seen below] : I have ordered 1 HBOT session at the indicated protocol as seen below [Patient demonstrated and verbalized proper technique for using air break mask] : Patient demonstrated and verbalized proper technique for using air break mask [Patient educated on the risks of SMOKING prior to HBOT with understanding] : Patient educated on the risks of SMOKING prior to HBOT with understanding [Patient educated on the risks of CONSUMING ALCOHOL prior to HBOT with understanding] : Patient educated on the risks of CONSUMING ALCOHOL prior to HBOT with understanding [100% Cotton] : 100% cotton [Empty all pockets] : empty all pockets [No hair oils, wigs, hairpieces, pins] : no hair oils, wigs, hairpieces, pins  [Pre tx medications] : pre tx medications  [No make-up, creams] : no make-up, creams  [No jewelry] : no jewelry  [No matches, cigarettes, lighters] : no matches, cigarettes, lighters  [Hearing aid removed] : hearing aid removed [Dentures removed] : dentures removed [Ground bracelet on pt's wrist] : ground bracelet on pt's wrist  [Contacts removed] : contacts removed  [Remove nail polish] : remove nail polish  [No reading material] : no reading material  [Bra, undergarments removed] : bra, undergarments removed  [No contraindicated dressings] : no contraindicated dressings [Ground Wire - VISUAL Verification - Intact/Free of Obstruction] : Ground Wire - VISUAL Verification - Intact/Free of Obstruction  [Ground Continuity - Verified < 1 ohm w/ Wrist Strap Ernesto] : Ground Continuity - Verified < 1 ohm w/ Wrist Strap Ernesto [Diagnosis: ___] : Diagnosis: [unfilled] [Number: ___] : Number: [unfilled] [Clear all fields] : clear all fields [] : No [FreeTextEntry1] : 2.0 [FreeTextEntry3] : 90 mins [FreeTextEntry5] : 1 [FreeTextEntry9] : 4414 [FreeTextEntry7] : 5358 [de-identified] : 8389 [de-identified] : 110  Mins/4 units

## 2022-02-17 NOTE — ADDENDUM
[FreeTextEntry1] : Pt descended to 2.0 ANALIA @ 1.75 PSI/min without incident in chamber #3\par Pt resting @ depth with chest rise and fall observed throughout tx. \par Pt ascended from 2.0 ANALAI @ 1.75 PSI/min without incident. \par Pt tolerated tx well.\par \par \par \par

## 2022-02-18 ENCOUNTER — APPOINTMENT (OUTPATIENT)
Dept: HYPERBARIC MEDICINE | Facility: CLINIC | Age: 57
End: 2022-02-18
Payer: MEDICAID

## 2022-02-18 ENCOUNTER — OUTPATIENT (OUTPATIENT)
Dept: OUTPATIENT SERVICES | Facility: HOSPITAL | Age: 57
LOS: 1 days | End: 2022-02-18
Payer: MEDICAID

## 2022-02-18 VITALS
SYSTOLIC BLOOD PRESSURE: 170 MMHG | OXYGEN SATURATION: 99 % | RESPIRATION RATE: 18 BRPM | DIASTOLIC BLOOD PRESSURE: 88 MMHG | TEMPERATURE: 209.84 F | HEART RATE: 90 BPM

## 2022-02-18 VITALS
OXYGEN SATURATION: 99 % | TEMPERATURE: 209.48 F | SYSTOLIC BLOOD PRESSURE: 172 MMHG | HEART RATE: 88 BPM | DIASTOLIC BLOOD PRESSURE: 88 MMHG | RESPIRATION RATE: 18 BRPM

## 2022-02-18 DIAGNOSIS — K62.9 DISEASE OF ANUS AND RECTUM, UNSPECIFIED: Chronic | ICD-10-CM

## 2022-02-18 PROCEDURE — G0277: CPT

## 2022-02-18 PROCEDURE — 82962 GLUCOSE BLOOD TEST: CPT

## 2022-02-18 PROCEDURE — 99183 HYPERBARIC OXYGEN THERAPY: CPT

## 2022-02-18 NOTE — ADDENDUM
[FreeTextEntry1] : Pt descended to 2.0 ANALIA @ 1.75 PSI/min without incident in chamber #3\par Pt resting @ depth with chest rise and fall observed throughout tx. \par Pt ascended from 2.0 ANALIA @ 1.75 PSI/min without incident. \par Pt tolerated tx well.\par \par \par \par

## 2022-02-18 NOTE — PROCEDURE
[Outpatient] : Outpatient [Ambulatory] : Patient is ambulatory. [THIS CHAMBER HAS BEEN CLEANED / DISINFECTED] : This chamber has been cleaned / disinfected according to local and hospital policy and procedure prior to this treatment. [____] : Post-Dive: Time - [unfilled] [___] : Post-Dive: Value - [unfilled] mg/dL [I have examined and evaluated this patient today, including ears and lungs and have cleared the patient] : I have examined and evaluated this patient today, including ears and lungs and have cleared the patient to continue HBOT as prescribed.  [Time MD/Provider assessed Patient: _____] : Time MD/Provider assessed Patient: [unfilled] [I have ordered 1 HBOT session at the indicated protocol as seen below] : I have ordered 1 HBOT session at the indicated protocol as seen below [Patient demonstrated and verbalized proper technique for using air break mask] : Patient demonstrated and verbalized proper technique for using air break mask [Patient educated on the risks of SMOKING prior to HBOT with understanding] : Patient educated on the risks of SMOKING prior to HBOT with understanding [Patient educated on the risks of CONSUMING ALCOHOL prior to HBOT with understanding] : Patient educated on the risks of CONSUMING ALCOHOL prior to HBOT with understanding [100% Cotton] : 100% cotton [Empty all pockets] : empty all pockets [No hair oils, wigs, hairpieces, pins] : no hair oils, wigs, hairpieces, pins  [Pre tx medications] : pre tx medications  [No make-up, creams] : no make-up, creams  [No jewelry] : no jewelry  [No matches, cigarettes, lighters] : no matches, cigarettes, lighters  [Hearing aid removed] : hearing aid removed [Dentures removed] : dentures removed [Ground bracelet on pt's wrist] : ground bracelet on pt's wrist  [Contacts removed] : contacts removed  [Remove nail polish] : remove nail polish  [No reading material] : no reading material  [Bra, undergarments removed] : bra, undergarments removed  [No contraindicated dressings] : no contraindicated dressings [Ground Wire - VISUAL Verification - Intact/Free of Obstruction] : Ground Wire - VISUAL Verification - Intact/Free of Obstruction  [Ground Continuity - Verified < 1 ohm w/ Wrist Strap Ernesto] : Ground Continuity - Verified < 1 ohm w/ Wrist Strap Ernesto [Diagnosis: ___] : Diagnosis: [unfilled] [Number: ___] : Number: [unfilled] [] : No [Clear all fields] : clear all fields [FreeTextEntry1] : 2.0 [FreeTextEntry3] : 90 mins [FreeTextEntry5] : 7486 [FreeTextEntry7] : 0099 [FreeTextEntry9] : 1520 [de-identified] : 1535 [de-identified] : 110  Mins/4 units

## 2022-02-22 ENCOUNTER — OUTPATIENT (OUTPATIENT)
Dept: OUTPATIENT SERVICES | Facility: HOSPITAL | Age: 57
LOS: 1 days | End: 2022-02-22
Payer: MEDICAID

## 2022-02-22 ENCOUNTER — APPOINTMENT (OUTPATIENT)
Dept: HYPERBARIC MEDICINE | Facility: CLINIC | Age: 57
End: 2022-02-22
Payer: MEDICAID

## 2022-02-22 VITALS
HEART RATE: 102 BPM | SYSTOLIC BLOOD PRESSURE: 158 MMHG | TEMPERATURE: 208.94 F | DIASTOLIC BLOOD PRESSURE: 78 MMHG | OXYGEN SATURATION: 98 % | RESPIRATION RATE: 16 BRPM

## 2022-02-22 VITALS
DIASTOLIC BLOOD PRESSURE: 90 MMHG | SYSTOLIC BLOOD PRESSURE: 142 MMHG | OXYGEN SATURATION: 99 % | RESPIRATION RATE: 18 BRPM | TEMPERATURE: 209.48 F | HEART RATE: 98 BPM

## 2022-02-22 DIAGNOSIS — K62.9 DISEASE OF ANUS AND RECTUM, UNSPECIFIED: Chronic | ICD-10-CM

## 2022-02-22 DIAGNOSIS — M79.673 PAIN IN UNSPECIFIED FOOT: ICD-10-CM

## 2022-02-22 PROCEDURE — G0277: CPT

## 2022-02-22 PROCEDURE — 99183 HYPERBARIC OXYGEN THERAPY: CPT

## 2022-02-22 PROCEDURE — 82962 GLUCOSE BLOOD TEST: CPT

## 2022-02-23 ENCOUNTER — APPOINTMENT (OUTPATIENT)
Dept: HEMATOLOGY ONCOLOGY | Facility: CLINIC | Age: 57
End: 2022-02-23
Payer: MEDICAID

## 2022-02-23 ENCOUNTER — APPOINTMENT (OUTPATIENT)
Dept: HYPERBARIC MEDICINE | Facility: CLINIC | Age: 57
End: 2022-02-23
Payer: MEDICAID

## 2022-02-23 ENCOUNTER — OUTPATIENT (OUTPATIENT)
Dept: OUTPATIENT SERVICES | Facility: HOSPITAL | Age: 57
LOS: 1 days | End: 2022-02-23
Payer: MEDICAID

## 2022-02-23 VITALS
OXYGEN SATURATION: 99 % | HEART RATE: 58 BPM | RESPIRATION RATE: 18 BRPM | TEMPERATURE: 208.94 F | SYSTOLIC BLOOD PRESSURE: 148 MMHG | DIASTOLIC BLOOD PRESSURE: 80 MMHG

## 2022-02-23 VITALS
TEMPERATURE: 209.66 F | HEART RATE: 68 BPM | RESPIRATION RATE: 18 BRPM | SYSTOLIC BLOOD PRESSURE: 140 MMHG | DIASTOLIC BLOOD PRESSURE: 90 MMHG | OXYGEN SATURATION: 99 %

## 2022-02-23 DIAGNOSIS — K62.9 DISEASE OF ANUS AND RECTUM, UNSPECIFIED: Chronic | ICD-10-CM

## 2022-02-23 PROCEDURE — G0277: CPT

## 2022-02-23 PROCEDURE — 82962 GLUCOSE BLOOD TEST: CPT

## 2022-02-23 PROCEDURE — 99183 HYPERBARIC OXYGEN THERAPY: CPT

## 2022-02-23 PROCEDURE — 99213 OFFICE O/P EST LOW 20 MIN: CPT | Mod: 95

## 2022-02-23 RX ORDER — DOCUSATE SODIUM 100 MG/1
100 CAPSULE ORAL 3 TIMES DAILY
Qty: 90 | Refills: 5 | Status: ACTIVE | COMMUNITY
Start: 2022-02-23 | End: 1900-01-01

## 2022-02-23 NOTE — REASON FOR VISIT
[Follow-Up] : a follow-up visit [Home] : at home, [unfilled] , at the time of the visit. [Medical Office: ([facility])___] : at the medical office located in  [Verbal consent obtained from patient] : the patient, [unfilled]

## 2022-02-23 NOTE — ASSESSMENT
[No change from previous assessment] : No change from previous assessment [Time MD/Provider assessed Patient:_______] : Time MD/Provider assessed Patient: [unfilled] [Patient prepared for dive] : Patient prepared for dive [Patient undergoing HBO treatment for __________] : Patient undergoing HBO treatment for [unfilled] [Patient descended without problem for 9 minutes] : Patient descended without problem for 9 minutes [No dizziness or thirst] :  No dizziness or thirst [No ear problems] : No ear problems [Vital signs stable] : Vital signs stable [Tolerating dive well] : Tolerating dive well [No Chest Pain, shortness of breath] : No Chest Pain, shortness of breath [Respiratory Rate Stable] : Respiratory Rate Stable [No chest pain, shortness of breath, or ear pain] :  No chest pain, shortness of breath, or ear pain  [Tolerated Ascent well] : Tolerated Ascent well [Vital Signs stable] : Vital Signs stable [A physician was present throughout the entire HBOT] : A physician was present throughout the entire HBOT [Yes] : Yes [Continue Treatment Plan] : Continue treatment plan [Clinically Stable] : Clinically stable

## 2022-02-23 NOTE — PROCEDURE
[Outpatient] : Outpatient [Ambulatory] : Patient is ambulatory. [THIS CHAMBER HAS BEEN CLEANED / DISINFECTED] : This chamber has been cleaned / disinfected according to local and hospital policy and procedure prior to this treatment. [____] : Post-Dive: Time - [unfilled] [___] : Post-Dive: Value - [unfilled] mg/dL [I have examined and evaluated this patient today, including ears and lungs and have cleared the patient] : I have examined and evaluated this patient today, including ears and lungs and have cleared the patient to continue HBOT as prescribed.  [Time MD/Provider assessed Patient: _____] : Time MD/Provider assessed Patient: [unfilled] [I have ordered 1 HBOT session at the indicated protocol as seen below] : I have ordered 1 HBOT session at the indicated protocol as seen below [Patient demonstrated and verbalized proper technique for using air break mask] : Patient demonstrated and verbalized proper technique for using air break mask [Patient educated on the risks of SMOKING prior to HBOT with understanding] : Patient educated on the risks of SMOKING prior to HBOT with understanding [Patient educated on the risks of CONSUMING ALCOHOL prior to HBOT with understanding] : Patient educated on the risks of CONSUMING ALCOHOL prior to HBOT with understanding [100% Cotton] : 100% cotton [Empty all pockets] : empty all pockets [No hair oils, wigs, hairpieces, pins] : no hair oils, wigs, hairpieces, pins  [Pre tx medications] : pre tx medications  [No make-up, creams] : no make-up, creams  [No jewelry] : no jewelry  [No matches, cigarettes, lighters] : no matches, cigarettes, lighters  [Hearing aid removed] : hearing aid removed [Dentures removed] : dentures removed [Ground bracelet on pt's wrist] : ground bracelet on pt's wrist  [Contacts removed] : contacts removed  [Remove nail polish] : remove nail polish  [No reading material] : no reading material  [Bra, undergarments removed] : bra, undergarments removed  [No contraindicated dressings] : no contraindicated dressings [Ground Wire - VISUAL Verification - Intact/Free of Obstruction] : Ground Wire - VISUAL Verification - Intact/Free of Obstruction  [Ground Continuity - Verified < 1 ohm w/ Wrist Strap Ernesto] : Ground Continuity - Verified < 1 ohm w/ Wrist Strap Ernesto [Diagnosis: ___] : Diagnosis: [unfilled] [Number: ___] : Number: [unfilled] [] : No [Clear all fields] : clear all fields [FreeTextEntry3] : 90 mins [FreeTextEntry1] : 2.0 [FreeTextEntry5] : 3778 [FreeTextEntry7] : 0388 [FreeTextEntry9] : 0243 [de-identified] : 1540 [de-identified] : 110  Mins/4 units

## 2022-02-23 NOTE — ASSESSMENT
[FreeTextEntry1] : 56yoM with:\par \par 1. Anal Cancer - s/p 5400cGy/ 15 fractions completed on 8/16/21 with Mitomycin Day #1. Xeloda discontinued 2/2 GI issues. Follow up with Med Onc, Rad Onc, wound care. \par \par 2. Rectal pain, acute - receiving HBOT.\par -Advised patient to increase MS ER to 45mg TID. c/w PRN Oxycodone IR 30mg, start Gabapentin 300mg QHS as recommended by Dr. Felipe.\par -Appreciate pain management input re: nerve block. Patient to follow up.  \par \par 3. Anxiety d/o - Follows closely with psychiatry. \par \par 4. Opioid-induced constipation -  C/w Lactulose daily. \par \par 5. Encounter for Palliative Care - Emotional support provided.\par \par Follow up in 2 weeks, call sooner with questions or issues.

## 2022-02-23 NOTE — HISTORY OF PRESENT ILLNESS
[FreeTextEntry1] : 56yoF with anal cancer presents for follow-up palliative care visit, referred by Oncology.  \par PMH significant for prostate cancer s/p RT in 2016, HIV on Biktarvy.\par \par In 2020 at age of 55 patient had his screening colonoscopy performed by Dr. Lawrence in June 2020 that as per patient was informed to be within normal limits. Subsequently patient had felt a bump in the perirectal area and at the time was told by GI that this could be a hemorrhoid and was treated as such.\par His symptoms did improve and was eventually referred to Dr. Avilez for further evaluation and in March 2021 patient was seen by Dr. Avilez and underwent an exam and biopsy in April that revealed evidence of anal squamous cell carcinoma. Patient was subsequently referred for evaluation and treatment. Patient did not have full excision of the tumor rather a excisional biopsy.\par \par Main reason for which patient is referred is pain which has been an issue for him for the last 3 months. The pain is localized to the anus/rectum. It is severe, not well controlled. Worse with defecation, sitting. At its worst the pain is 10/10. Rates it as 8/10 presently. \par \par He is currently using Oxycodone IR 20mg PRN, takes this every 6 hours. He was previously tried on transdermal fentanyl 25mcg/hr and oxycontin but he states that neither helped his pain. He no longer uses either. \par Has tried using anusol suppository without relief. \par \par Underwent EUA by Dr. Avilez on 11/26/21 for evaluation of persistent kandy-anal pain and a 4 x 4 cm ulcer was found but no fissure or fistula. A biopsy was performed and was negative for dysplasia or malignancy.  \par \par Interval History (2/23/22):\par Patient presents for follow-up, seen via TeleMedicine. He is on week 3 of HBOT, total of 6 weeks is the duration of treatment. \par \par Rectal pain remains severe. He remains on MS ER 30/30/45mg, did not increase to 45mg TID as previously recommended, and continues to report minimal improvement. The pain continues to necessitate the use of PRN oxycodone IR 30mg q4h ATC. Pain gets as high as 10/10, as low as 4/10 for approximately 3 hours after PRN use. He states that pain is worse with BM's and with standing for extended periods of time. He also performs Sitz bathes 2-3 times a day which provide relief while in the bath only. \par \par Met with telepain service, gabapentin 300mg QHS was prescribed, patient hasn't picked up yet. \par \par Constipation has been an issue. He is requiring Lactulose QD. \par \par ROS:\par + appetite is good\par + hot flushes from prostate ca therapy\par +uses colace, dulcolax, prune juice daily and PRN Lactulose daily\par +anxiety d/o - uses alprazolam 1mg PRN, olanzapine 10mg QHS\par +insomnia - is on long-standing ambien 10mg QHS \par Denies nausea/vomiting.\par All other ROS as outlined or noncontributory. \par \par Patient is , lives with his wife. \par He is not presently driving, uses a car service to get to medical appointments. \par Quit smoking tobacco about two years ago. No EtOH. \par Passes time by watching TV. \par \par [facility]par Urologist: Dr. Jung \par \par I-Stop Ref#: [id number]

## 2022-02-23 NOTE — ADDENDUM
[FreeTextEntry1] : Pt descended to 2.0 ANALIA @ 1.5 PSI/min without incident in chamber #3\par Pt resting @ depth with chest rise and fall observed throughout tx. \par Pt ascended from 2.0 ANALIA @ 1.5 PSI/min without incident. \par Pt tolerated tx well.\par \par \par \par

## 2022-02-24 ENCOUNTER — APPOINTMENT (OUTPATIENT)
Dept: HYPERBARIC MEDICINE | Facility: CLINIC | Age: 57
End: 2022-02-24
Payer: MEDICAID

## 2022-02-24 ENCOUNTER — OUTPATIENT (OUTPATIENT)
Dept: OUTPATIENT SERVICES | Facility: HOSPITAL | Age: 57
LOS: 1 days | End: 2022-02-24
Payer: MEDICAID

## 2022-02-24 VITALS
RESPIRATION RATE: 18 BRPM | DIASTOLIC BLOOD PRESSURE: 78 MMHG | OXYGEN SATURATION: 100 % | TEMPERATURE: 209.12 F | SYSTOLIC BLOOD PRESSURE: 122 MMHG | HEART RATE: 66 BPM

## 2022-02-24 VITALS
DIASTOLIC BLOOD PRESSURE: 88 MMHG | TEMPERATURE: 209.66 F | HEART RATE: 100 BPM | OXYGEN SATURATION: 98 % | RESPIRATION RATE: 18 BRPM | SYSTOLIC BLOOD PRESSURE: 146 MMHG

## 2022-02-24 DIAGNOSIS — L59.8 OTHER SPECIFIED DISORDERS OF THE SKIN AND SUBCUTANEOUS TISSUE RELATED TO RADIATION: ICD-10-CM

## 2022-02-24 DIAGNOSIS — K62.9 DISEASE OF ANUS AND RECTUM, UNSPECIFIED: Chronic | ICD-10-CM

## 2022-02-24 PROCEDURE — 99183 HYPERBARIC OXYGEN THERAPY: CPT

## 2022-02-24 PROCEDURE — G0277: CPT

## 2022-02-24 PROCEDURE — 82962 GLUCOSE BLOOD TEST: CPT

## 2022-02-24 NOTE — PROCEDURE
[Outpatient] : Outpatient [Ambulatory] : Patient is ambulatory. [THIS CHAMBER HAS BEEN CLEANED / DISINFECTED] : This chamber has been cleaned / disinfected according to local and hospital policy and procedure prior to this treatment. [____] : Post-Dive: Time - [unfilled] [___] : Post-Dive: Value - [unfilled] mg/dL [I have examined and evaluated this patient today, including ears and lungs and have cleared the patient] : I have examined and evaluated this patient today, including ears and lungs and have cleared the patient to continue HBOT as prescribed.  [Time MD/Provider assessed Patient: _____] : Time MD/Provider assessed Patient: [unfilled] [I have ordered 1 HBOT session at the indicated protocol as seen below] : I have ordered 1 HBOT session at the indicated protocol as seen below [Patient demonstrated and verbalized proper technique for using air break mask] : Patient demonstrated and verbalized proper technique for using air break mask [Patient educated on the risks of SMOKING prior to HBOT with understanding] : Patient educated on the risks of SMOKING prior to HBOT with understanding [Patient educated on the risks of CONSUMING ALCOHOL prior to HBOT with understanding] : Patient educated on the risks of CONSUMING ALCOHOL prior to HBOT with understanding [100% Cotton] : 100% cotton [Empty all pockets] : empty all pockets [No hair oils, wigs, hairpieces, pins] : no hair oils, wigs, hairpieces, pins  [Pre tx medications] : pre tx medications  [No make-up, creams] : no make-up, creams  [No jewelry] : no jewelry  [No matches, cigarettes, lighters] : no matches, cigarettes, lighters  [Hearing aid removed] : hearing aid removed [Dentures removed] : dentures removed [Ground bracelet on pt's wrist] : ground bracelet on pt's wrist  [Contacts removed] : contacts removed  [Remove nail polish] : remove nail polish  [No reading material] : no reading material  [Bra, undergarments removed] : bra, undergarments removed  [No contraindicated dressings] : no contraindicated dressings [Ground Wire - VISUAL Verification - Intact/Free of Obstruction] : Ground Wire - VISUAL Verification - Intact/Free of Obstruction  [Ground Continuity - Verified < 1 ohm w/ Wrist Strap Ernesto] : Ground Continuity - Verified < 1 ohm w/ Wrist Strap Ernesto [Diagnosis: ___] : Diagnosis: [unfilled] [Number: ___] : Number: [unfilled] [Clear all fields] : clear all fields [] : No [FreeTextEntry1] : 2.0 [FreeTextEntry3] : 90 mins [FreeTextEntry5] : 7940 [FreeTextEntry7] : 6268 [[facility]] : 3240 [de-identified] : 1544 [de-identified] : 110  Mins/4 units

## 2022-02-25 ENCOUNTER — OUTPATIENT (OUTPATIENT)
Dept: OUTPATIENT SERVICES | Facility: HOSPITAL | Age: 57
LOS: 1 days | End: 2022-02-25
Payer: MEDICAID

## 2022-02-25 ENCOUNTER — APPOINTMENT (OUTPATIENT)
Dept: HYPERBARIC MEDICINE | Facility: CLINIC | Age: 57
End: 2022-02-25
Payer: MEDICAID

## 2022-02-25 VITALS
RESPIRATION RATE: 18 BRPM | OXYGEN SATURATION: 99 % | DIASTOLIC BLOOD PRESSURE: 68 MMHG | SYSTOLIC BLOOD PRESSURE: 150 MMHG | HEART RATE: 78 BPM | TEMPERATURE: 208.94 F

## 2022-02-25 VITALS
DIASTOLIC BLOOD PRESSURE: 90 MMHG | OXYGEN SATURATION: 99 % | HEART RATE: 88 BPM | SYSTOLIC BLOOD PRESSURE: 135 MMHG | TEMPERATURE: 208.94 F | RESPIRATION RATE: 18 BRPM

## 2022-02-25 DIAGNOSIS — K62.9 DISEASE OF ANUS AND RECTUM, UNSPECIFIED: Chronic | ICD-10-CM

## 2022-02-25 DIAGNOSIS — M79.673 PAIN IN UNSPECIFIED FOOT: ICD-10-CM

## 2022-02-25 PROCEDURE — 99183 HYPERBARIC OXYGEN THERAPY: CPT

## 2022-02-25 PROCEDURE — G0277: CPT

## 2022-02-25 PROCEDURE — 82962 GLUCOSE BLOOD TEST: CPT

## 2022-02-28 ENCOUNTER — OUTPATIENT (OUTPATIENT)
Dept: OUTPATIENT SERVICES | Facility: HOSPITAL | Age: 57
LOS: 1 days | End: 2022-02-28
Payer: MEDICAID

## 2022-02-28 ENCOUNTER — APPOINTMENT (OUTPATIENT)
Dept: HYPERBARIC MEDICINE | Facility: CLINIC | Age: 57
End: 2022-02-28
Payer: MEDICAID

## 2022-02-28 VITALS
TEMPERATURE: 208.58 F | RESPIRATION RATE: 16 BRPM | SYSTOLIC BLOOD PRESSURE: 147 MMHG | DIASTOLIC BLOOD PRESSURE: 97 MMHG | HEART RATE: 102 BPM

## 2022-02-28 VITALS
OXYGEN SATURATION: 99 % | SYSTOLIC BLOOD PRESSURE: 156 MMHG | HEART RATE: 66 BPM | TEMPERATURE: 208.94 F | DIASTOLIC BLOOD PRESSURE: 88 MMHG | RESPIRATION RATE: 18 BRPM

## 2022-02-28 DIAGNOSIS — K62.9 DISEASE OF ANUS AND RECTUM, UNSPECIFIED: Chronic | ICD-10-CM

## 2022-02-28 PROCEDURE — 82962 GLUCOSE BLOOD TEST: CPT

## 2022-02-28 PROCEDURE — 99183 HYPERBARIC OXYGEN THERAPY: CPT

## 2022-02-28 PROCEDURE — G0277: CPT

## 2022-02-28 NOTE — PROCEDURE
[Outpatient] : Outpatient [Ambulatory] : Patient is ambulatory. [THIS CHAMBER HAS BEEN CLEANED / DISINFECTED] : This chamber has been cleaned / disinfected according to local and hospital policy and procedure prior to this treatment. [____] : Post-Dive: Time - [unfilled] [___] : Post-Dive: Value - [unfilled] mg/dL [I have examined and evaluated this patient today, including ears and lungs and have cleared the patient] : I have examined and evaluated this patient today, including ears and lungs and have cleared the patient to continue HBOT as prescribed.  [Time MD/Provider assessed Patient: _____] : Time MD/Provider assessed Patient: [unfilled] [I have ordered 1 HBOT session at the indicated protocol as seen below] : I have ordered 1 HBOT session at the indicated protocol as seen below [Patient demonstrated and verbalized proper technique for using air break mask] : Patient demonstrated and verbalized proper technique for using air break mask [Patient educated on the risks of SMOKING prior to HBOT with understanding] : Patient educated on the risks of SMOKING prior to HBOT with understanding [Patient educated on the risks of CONSUMING ALCOHOL prior to HBOT with understanding] : Patient educated on the risks of CONSUMING ALCOHOL prior to HBOT with understanding [100% Cotton] : 100% cotton [Empty all pockets] : empty all pockets [No hair oils, wigs, hairpieces, pins] : no hair oils, wigs, hairpieces, pins  [Pre tx medications] : pre tx medications  [No make-up, creams] : no make-up, creams  [No jewelry] : no jewelry  [No matches, cigarettes, lighters] : no matches, cigarettes, lighters  [Hearing aid removed] : hearing aid removed [Dentures removed] : dentures removed [Ground bracelet on pt's wrist] : ground bracelet on pt's wrist  [Contacts removed] : contacts removed  [Remove nail polish] : remove nail polish  [No reading material] : no reading material  [Bra, undergarments removed] : bra, undergarments removed  [No contraindicated dressings] : no contraindicated dressings [Ground Wire - VISUAL Verification - Intact/Free of Obstruction] : Ground Wire - VISUAL Verification - Intact/Free of Obstruction  [Ground Continuity - Verified < 1 ohm w/ Wrist Strap Ernesto] : Ground Continuity - Verified < 1 ohm w/ Wrist Strap Ernesto [Clear all fields] : clear all fields [Number: ___] : Number: [unfilled] [Diagnosis: ___] : Diagnosis: [unfilled] [] : No [FreeTextEntry1] : 2.0 [FreeTextEntry3] : 90 mins [FreeTextEntry5] : 0945 [FreeTextEntry7] : 9635 [FreeTextEntry9] : 1520 [de-identified] : 1533 [de-identified] : 110  Mins/4 units

## 2022-03-01 ENCOUNTER — APPOINTMENT (OUTPATIENT)
Dept: HYPERBARIC MEDICINE | Facility: CLINIC | Age: 57
End: 2022-03-01
Payer: MEDICAID

## 2022-03-01 ENCOUNTER — OUTPATIENT (OUTPATIENT)
Dept: OUTPATIENT SERVICES | Facility: HOSPITAL | Age: 57
LOS: 1 days | End: 2022-03-01
Payer: MEDICAID

## 2022-03-01 VITALS
RESPIRATION RATE: 18 BRPM | TEMPERATURE: 209.12 F | DIASTOLIC BLOOD PRESSURE: 80 MMHG | OXYGEN SATURATION: 99 % | SYSTOLIC BLOOD PRESSURE: 121 MMHG | HEART RATE: 108 BPM

## 2022-03-01 VITALS
TEMPERATURE: 209.12 F | DIASTOLIC BLOOD PRESSURE: 78 MMHG | HEART RATE: 88 BPM | SYSTOLIC BLOOD PRESSURE: 154 MMHG | RESPIRATION RATE: 18 BRPM

## 2022-03-01 DIAGNOSIS — L59.8 OTHER SPECIFIED DISORDERS OF THE SKIN AND SUBCUTANEOUS TISSUE RELATED TO RADIATION: ICD-10-CM

## 2022-03-01 DIAGNOSIS — K62.9 DISEASE OF ANUS AND RECTUM, UNSPECIFIED: Chronic | ICD-10-CM

## 2022-03-01 DIAGNOSIS — M79.673 PAIN IN UNSPECIFIED FOOT: ICD-10-CM

## 2022-03-01 PROCEDURE — 82962 GLUCOSE BLOOD TEST: CPT

## 2022-03-01 PROCEDURE — G0277: CPT

## 2022-03-01 PROCEDURE — 99183 HYPERBARIC OXYGEN THERAPY: CPT

## 2022-03-02 ENCOUNTER — APPOINTMENT (OUTPATIENT)
Dept: HYPERBARIC MEDICINE | Facility: CLINIC | Age: 57
End: 2022-03-02
Payer: MEDICAID

## 2022-03-02 ENCOUNTER — OUTPATIENT (OUTPATIENT)
Dept: OUTPATIENT SERVICES | Facility: HOSPITAL | Age: 57
LOS: 1 days | End: 2022-03-02
Payer: MEDICAID

## 2022-03-02 VITALS
OXYGEN SATURATION: 98 % | DIASTOLIC BLOOD PRESSURE: 90 MMHG | SYSTOLIC BLOOD PRESSURE: 148 MMHG | RESPIRATION RATE: 18 BRPM | HEART RATE: 94 BPM | TEMPERATURE: 209.48 F

## 2022-03-02 VITALS
OXYGEN SATURATION: 98 % | DIASTOLIC BLOOD PRESSURE: 70 MMHG | HEART RATE: 78 BPM | TEMPERATURE: 209.84 F | SYSTOLIC BLOOD PRESSURE: 110 MMHG | RESPIRATION RATE: 18 BRPM

## 2022-03-02 DIAGNOSIS — K62.9 DISEASE OF ANUS AND RECTUM, UNSPECIFIED: Chronic | ICD-10-CM

## 2022-03-02 PROCEDURE — 82962 GLUCOSE BLOOD TEST: CPT

## 2022-03-02 PROCEDURE — G0277: CPT

## 2022-03-02 PROCEDURE — 99183 HYPERBARIC OXYGEN THERAPY: CPT

## 2022-03-02 NOTE — PROCEDURE
[Outpatient] : Outpatient [Ambulatory] : Patient is ambulatory. [THIS CHAMBER HAS BEEN CLEANED / DISINFECTED] : This chamber has been cleaned / disinfected according to local and hospital policy and procedure prior to this treatment. [____] : Post-Dive: Time - [unfilled] [___] : Post-Dive: Value - [unfilled] mg/dL [I have examined and evaluated this patient today, including ears and lungs and have cleared the patient] : I have examined and evaluated this patient today, including ears and lungs and have cleared the patient to continue HBOT as prescribed.  [Time MD/Provider assessed Patient: _____] : Time MD/Provider assessed Patient: [unfilled] [I have ordered 1 HBOT session at the indicated protocol as seen below] : I have ordered 1 HBOT session at the indicated protocol as seen below [Patient demonstrated and verbalized proper technique for using air break mask] : Patient demonstrated and verbalized proper technique for using air break mask [Patient educated on the risks of SMOKING prior to HBOT with understanding] : Patient educated on the risks of SMOKING prior to HBOT with understanding [Patient educated on the risks of CONSUMING ALCOHOL prior to HBOT with understanding] : Patient educated on the risks of CONSUMING ALCOHOL prior to HBOT with understanding [100% Cotton] : 100% cotton [Empty all pockets] : empty all pockets [No hair oils, wigs, hairpieces, pins] : no hair oils, wigs, hairpieces, pins  [Pre tx medications] : pre tx medications  [No make-up, creams] : no make-up, creams  [No jewelry] : no jewelry  [No matches, cigarettes, lighters] : no matches, cigarettes, lighters  [Hearing aid removed] : hearing aid removed [Dentures removed] : dentures removed [Ground bracelet on pt's wrist] : ground bracelet on pt's wrist  [Contacts removed] : contacts removed  [Remove nail polish] : remove nail polish  [No reading material] : no reading material  [Bra, undergarments removed] : bra, undergarments removed  [No contraindicated dressings] : no contraindicated dressings [Ground Wire - VISUAL Verification - Intact/Free of Obstruction] : Ground Wire - VISUAL Verification - Intact/Free of Obstruction  [Ground Continuity - Verified < 1 ohm w/ Wrist Strap Ernesto] : Ground Continuity - Verified < 1 ohm w/ Wrist Strap Ernesto [Diagnosis: ___] : Diagnosis: [unfilled] [Number: ___] : Number: [unfilled] [] : No [Clear all fields] : clear all fields [FreeTextEntry1] : 2.0 [FreeTextEntry3] : 90 mins [FreeTextEntry5] : 2029 [FreeTextEntry7] : 9284 [FreeTextEntry9] : 2369 [de-identified] : 5113 [de-identified] : 110  Mins/4 units

## 2022-03-03 ENCOUNTER — APPOINTMENT (OUTPATIENT)
Dept: HYPERBARIC MEDICINE | Facility: CLINIC | Age: 57
End: 2022-03-03
Payer: MEDICAID

## 2022-03-03 ENCOUNTER — OUTPATIENT (OUTPATIENT)
Dept: OUTPATIENT SERVICES | Facility: HOSPITAL | Age: 57
LOS: 1 days | End: 2022-03-03
Payer: MEDICAID

## 2022-03-03 VITALS
SYSTOLIC BLOOD PRESSURE: 136 MMHG | DIASTOLIC BLOOD PRESSURE: 88 MMHG | HEART RATE: 88 BPM | OXYGEN SATURATION: 98 % | RESPIRATION RATE: 18 BRPM | TEMPERATURE: 208.94 F

## 2022-03-03 VITALS
RESPIRATION RATE: 18 BRPM | OXYGEN SATURATION: 98 % | HEART RATE: 88 BPM | DIASTOLIC BLOOD PRESSURE: 74 MMHG | TEMPERATURE: 208.94 F | SYSTOLIC BLOOD PRESSURE: 114 MMHG

## 2022-03-03 DIAGNOSIS — K62.9 DISEASE OF ANUS AND RECTUM, UNSPECIFIED: Chronic | ICD-10-CM

## 2022-03-03 DIAGNOSIS — L59.8 OTHER SPECIFIED DISORDERS OF THE SKIN AND SUBCUTANEOUS TISSUE RELATED TO RADIATION: ICD-10-CM

## 2022-03-03 PROCEDURE — 82962 GLUCOSE BLOOD TEST: CPT

## 2022-03-03 PROCEDURE — 99183 HYPERBARIC OXYGEN THERAPY: CPT

## 2022-03-03 PROCEDURE — G0277: CPT

## 2022-03-04 ENCOUNTER — APPOINTMENT (OUTPATIENT)
Dept: HYPERBARIC MEDICINE | Facility: CLINIC | Age: 57
End: 2022-03-04
Payer: MEDICAID

## 2022-03-04 ENCOUNTER — OUTPATIENT (OUTPATIENT)
Dept: OUTPATIENT SERVICES | Facility: HOSPITAL | Age: 57
LOS: 1 days | End: 2022-03-04
Payer: MEDICAID

## 2022-03-04 VITALS
RESPIRATION RATE: 18 BRPM | SYSTOLIC BLOOD PRESSURE: 128 MMHG | OXYGEN SATURATION: 99 % | DIASTOLIC BLOOD PRESSURE: 86 MMHG | TEMPERATURE: 209.66 F | HEART RATE: 88 BPM

## 2022-03-04 VITALS
OXYGEN SATURATION: 99 % | DIASTOLIC BLOOD PRESSURE: 98 MMHG | SYSTOLIC BLOOD PRESSURE: 144 MMHG | TEMPERATURE: 209.48 F | RESPIRATION RATE: 18 BRPM | HEART RATE: 92 BPM

## 2022-03-04 DIAGNOSIS — K62.9 DISEASE OF ANUS AND RECTUM, UNSPECIFIED: Chronic | ICD-10-CM

## 2022-03-04 PROCEDURE — 99183 HYPERBARIC OXYGEN THERAPY: CPT

## 2022-03-04 PROCEDURE — G0277: CPT

## 2022-03-04 PROCEDURE — 82962 GLUCOSE BLOOD TEST: CPT

## 2022-03-04 NOTE — PROCEDURE
[Outpatient] : Outpatient [Ambulatory] : Patient is ambulatory. [THIS CHAMBER HAS BEEN CLEANED / DISINFECTED] : This chamber has been cleaned / disinfected according to local and hospital policy and procedure prior to this treatment. [____] : Post-Dive: Time - [unfilled] [___] : Post-Dive: Value - [unfilled] mg/dL [I have examined and evaluated this patient today, including ears and lungs and have cleared the patient] : I have examined and evaluated this patient today, including ears and lungs and have cleared the patient to continue HBOT as prescribed.  [Time MD/Provider assessed Patient: _____] : Time MD/Provider assessed Patient: [unfilled] [I have ordered 1 HBOT session at the indicated protocol as seen below] : I have ordered 1 HBOT session at the indicated protocol as seen below [Patient demonstrated and verbalized proper technique for using air break mask] : Patient demonstrated and verbalized proper technique for using air break mask [Patient educated on the risks of SMOKING prior to HBOT with understanding] : Patient educated on the risks of SMOKING prior to HBOT with understanding [Patient educated on the risks of CONSUMING ALCOHOL prior to HBOT with understanding] : Patient educated on the risks of CONSUMING ALCOHOL prior to HBOT with understanding [100% Cotton] : 100% cotton [Empty all pockets] : empty all pockets [No hair oils, wigs, hairpieces, pins] : no hair oils, wigs, hairpieces, pins  [Pre tx medications] : pre tx medications  [No make-up, creams] : no make-up, creams  [No jewelry] : no jewelry  [No matches, cigarettes, lighters] : no matches, cigarettes, lighters  [Hearing aid removed] : hearing aid removed [Dentures removed] : dentures removed [Ground bracelet on pt's wrist] : ground bracelet on pt's wrist  [Contacts removed] : contacts removed  [Remove nail polish] : remove nail polish  [No reading material] : no reading material  [Bra, undergarments removed] : bra, undergarments removed  [No contraindicated dressings] : no contraindicated dressings [Ground Wire - VISUAL Verification - Intact/Free of Obstruction] : Ground Wire - VISUAL Verification - Intact/Free of Obstruction  [Ground Continuity - Verified < 1 ohm w/ Wrist Strap Ernesto] : Ground Continuity - Verified < 1 ohm w/ Wrist Strap Ernesto [Diagnosis: ___] : Diagnosis: [unfilled] [Number: ___] : Number: [unfilled] [Clear all fields] : clear all fields [] : No [FreeTextEntry1] : 2.0 [FreeTextEntry3] : 90 mins [[facility] : 5212 [[facility]] : 1676 [FreeTextEntry9] : 8599 [de-identified] : 8418 [de-identified] : 110  Mins/4 units

## 2022-03-04 NOTE — PROCEDURE
[Outpatient] : Outpatient [Ambulatory] : Patient is ambulatory. [THIS CHAMBER HAS BEEN CLEANED / DISINFECTED] : This chamber has been cleaned / disinfected according to local and hospital policy and procedure prior to this treatment. [____] : Post-Dive: Time - [unfilled] [___] : Post-Dive: Value - [unfilled] mg/dL [I have examined and evaluated this patient today, including ears and lungs and have cleared the patient] : I have examined and evaluated this patient today, including ears and lungs and have cleared the patient to continue HBOT as prescribed.  [Time MD/Provider assessed Patient: _____] : Time MD/Provider assessed Patient: [unfilled] [I have ordered 1 HBOT session at the indicated protocol as seen below] : I have ordered 1 HBOT session at the indicated protocol as seen below [Patient demonstrated and verbalized proper technique for using air break mask] : Patient demonstrated and verbalized proper technique for using air break mask [Patient educated on the risks of SMOKING prior to HBOT with understanding] : Patient educated on the risks of SMOKING prior to HBOT with understanding [Patient educated on the risks of CONSUMING ALCOHOL prior to HBOT with understanding] : Patient educated on the risks of CONSUMING ALCOHOL prior to HBOT with understanding [100% Cotton] : 100% cotton [Empty all pockets] : empty all pockets [No hair oils, wigs, hairpieces, pins] : no hair oils, wigs, hairpieces, pins  [Pre tx medications] : pre tx medications  [No make-up, creams] : no make-up, creams  [No jewelry] : no jewelry  [No matches, cigarettes, lighters] : no matches, cigarettes, lighters  [Hearing aid removed] : hearing aid removed [Dentures removed] : dentures removed [Ground bracelet on pt's wrist] : ground bracelet on pt's wrist  [Contacts removed] : contacts removed  [Remove nail polish] : remove nail polish  [No reading material] : no reading material  [Bra, undergarments removed] : bra, undergarments removed  [No contraindicated dressings] : no contraindicated dressings [Ground Wire - VISUAL Verification - Intact/Free of Obstruction] : Ground Wire - VISUAL Verification - Intact/Free of Obstruction  [Ground Continuity - Verified < 1 ohm w/ Wrist Strap Ernesto] : Ground Continuity - Verified < 1 ohm w/ Wrist Strap Ernesto [Diagnosis: ___] : Diagnosis: [unfilled] [Number: ___] : Number: [unfilled] [Clear all fields] : clear all fields [] : No [FreeTextEntry1] : 2.0 [FreeTextEntry3] : 90 mins [FreeTextEntry5] : 5752 [FreeTextEntry5] : 9193 [FreeTextEntry9] : 5727 [de-identified] : 3889 [de-identified] : 110  Mins/4 units

## 2022-03-04 NOTE — PROCEDURE
[Outpatient] : Outpatient [Ambulatory] : Patient is ambulatory. [THIS CHAMBER HAS BEEN CLEANED / DISINFECTED] : This chamber has been cleaned / disinfected according to local and hospital policy and procedure prior to this treatment. [____] : Post-Dive: Time - [unfilled] [___] : Post-Dive: Value - [unfilled] mg/dL [I have examined and evaluated this patient today, including ears and lungs and have cleared the patient] : I have examined and evaluated this patient today, including ears and lungs and have cleared the patient to continue HBOT as prescribed.  [Time MD/Provider assessed Patient: _____] : Time MD/Provider assessed Patient: [unfilled] [I have ordered 1 HBOT session at the indicated protocol as seen below] : I have ordered 1 HBOT session at the indicated protocol as seen below [Patient demonstrated and verbalized proper technique for using air break mask] : Patient demonstrated and verbalized proper technique for using air break mask [Patient educated on the risks of SMOKING prior to HBOT with understanding] : Patient educated on the risks of SMOKING prior to HBOT with understanding [Patient educated on the risks of CONSUMING ALCOHOL prior to HBOT with understanding] : Patient educated on the risks of CONSUMING ALCOHOL prior to HBOT with understanding [100% Cotton] : 100% cotton [Empty all pockets] : empty all pockets [No hair oils, wigs, hairpieces, pins] : no hair oils, wigs, hairpieces, pins  [Pre tx medications] : pre tx medications  [No make-up, creams] : no make-up, creams  [No jewelry] : no jewelry  [No matches, cigarettes, lighters] : no matches, cigarettes, lighters  [Hearing aid removed] : hearing aid removed [Dentures removed] : dentures removed [Ground bracelet on pt's wrist] : ground bracelet on pt's wrist  [Contacts removed] : contacts removed  [Remove nail polish] : remove nail polish  [No reading material] : no reading material  [Bra, undergarments removed] : bra, undergarments removed  [No contraindicated dressings] : no contraindicated dressings [Ground Wire - VISUAL Verification - Intact/Free of Obstruction] : Ground Wire - VISUAL Verification - Intact/Free of Obstruction  [Ground Continuity - Verified < 1 ohm w/ Wrist Strap Ernesto] : Ground Continuity - Verified < 1 ohm w/ Wrist Strap Ernesto [Diagnosis: ___] : Diagnosis: [unfilled] [Number: ___] : Number: [unfilled] [Clear all fields] : clear all fields [] : No [FreeTextEntry1] : 2.0 [FreeTextEntry3] : 90 mins [FreeTextEntry5] : 5935 [FreeTextEntry7] : 6103 [FreeTextEntry9] : 1528 [de-identified] : 1533 [de-identified] : 110  Mins/4 units

## 2022-03-04 NOTE — PROCEDURE
[Outpatient] : Outpatient [Ambulatory] : Patient is ambulatory. [THIS CHAMBER HAS BEEN CLEANED / DISINFECTED] : This chamber has been cleaned / disinfected according to local and hospital policy and procedure prior to this treatment. [____] : Post-Dive: Time - [unfilled] [___] : Post-Dive: Value - [unfilled] mg/dL [I have examined and evaluated this patient today, including ears and lungs and have cleared the patient] : I have examined and evaluated this patient today, including ears and lungs and have cleared the patient to continue HBOT as prescribed.  [Time MD/Provider assessed Patient: _____] : Time MD/Provider assessed Patient: [unfilled] [I have ordered 1 HBOT session at the indicated protocol as seen below] : I have ordered 1 HBOT session at the indicated protocol as seen below [Patient demonstrated and verbalized proper technique for using air break mask] : Patient demonstrated and verbalized proper technique for using air break mask [Patient educated on the risks of SMOKING prior to HBOT with understanding] : Patient educated on the risks of SMOKING prior to HBOT with understanding [Patient educated on the risks of CONSUMING ALCOHOL prior to HBOT with understanding] : Patient educated on the risks of CONSUMING ALCOHOL prior to HBOT with understanding [100% Cotton] : 100% cotton [Empty all pockets] : empty all pockets [No hair oils, wigs, hairpieces, pins] : no hair oils, wigs, hairpieces, pins  [Pre tx medications] : pre tx medications  [No make-up, creams] : no make-up, creams  [No jewelry] : no jewelry  [No matches, cigarettes, lighters] : no matches, cigarettes, lighters  [Hearing aid removed] : hearing aid removed [Dentures removed] : dentures removed [Ground bracelet on pt's wrist] : ground bracelet on pt's wrist  [Contacts removed] : contacts removed  [Remove nail polish] : remove nail polish  [No reading material] : no reading material  [Bra, undergarments removed] : bra, undergarments removed  [No contraindicated dressings] : no contraindicated dressings [Ground Wire - VISUAL Verification - Intact/Free of Obstruction] : Ground Wire - VISUAL Verification - Intact/Free of Obstruction  [Ground Continuity - Verified < 1 ohm w/ Wrist Strap Ernesto] : Ground Continuity - Verified < 1 ohm w/ Wrist Strap Ernesto [Clear all fields] : clear all fields [Diagnosis: ___] : Diagnosis: [unfilled] [Number: ___] : Number: [unfilled] [] : No [FreeTextEntry1] : 2.0 [FreeTextEntry3] : 90 mins [FreeTextEntry5] : 3517 [facility] : 8017 [FreeTextEntry9] : 6632 [de-identified] : 1718 [de-identified] : 110  Mins/4 units

## 2022-03-05 DIAGNOSIS — M79.673 PAIN IN UNSPECIFIED FOOT: ICD-10-CM

## 2022-03-07 ENCOUNTER — APPOINTMENT (OUTPATIENT)
Dept: HYPERBARIC MEDICINE | Facility: CLINIC | Age: 57
End: 2022-03-07
Payer: MEDICAID

## 2022-03-07 ENCOUNTER — APPOINTMENT (OUTPATIENT)
Dept: HEMATOLOGY ONCOLOGY | Facility: CLINIC | Age: 57
End: 2022-03-07

## 2022-03-07 ENCOUNTER — OUTPATIENT (OUTPATIENT)
Dept: OUTPATIENT SERVICES | Facility: HOSPITAL | Age: 57
LOS: 1 days | End: 2022-03-07
Payer: MEDICAID

## 2022-03-07 VITALS
OXYGEN SATURATION: 99 % | SYSTOLIC BLOOD PRESSURE: 138 MMHG | DIASTOLIC BLOOD PRESSURE: 78 MMHG | HEART RATE: 99 BPM | RESPIRATION RATE: 18 BRPM | TEMPERATURE: 208.94 F

## 2022-03-07 VITALS
DIASTOLIC BLOOD PRESSURE: 78 MMHG | TEMPERATURE: 208.94 F | HEART RATE: 94 BPM | RESPIRATION RATE: 18 BRPM | SYSTOLIC BLOOD PRESSURE: 154 MMHG | OXYGEN SATURATION: 98 %

## 2022-03-07 DIAGNOSIS — K62.9 DISEASE OF ANUS AND RECTUM, UNSPECIFIED: Chronic | ICD-10-CM

## 2022-03-07 PROCEDURE — G0277: CPT

## 2022-03-07 PROCEDURE — 82962 GLUCOSE BLOOD TEST: CPT

## 2022-03-07 PROCEDURE — 99183 HYPERBARIC OXYGEN THERAPY: CPT

## 2022-03-08 ENCOUNTER — OUTPATIENT (OUTPATIENT)
Dept: OUTPATIENT SERVICES | Facility: HOSPITAL | Age: 57
LOS: 1 days | End: 2022-03-08
Payer: MEDICAID

## 2022-03-08 ENCOUNTER — OUTPATIENT (OUTPATIENT)
Dept: OUTPATIENT SERVICES | Facility: HOSPITAL | Age: 57
LOS: 1 days | Discharge: HOME | End: 2022-03-08

## 2022-03-08 ENCOUNTER — APPOINTMENT (OUTPATIENT)
Dept: HYPERBARIC MEDICINE | Facility: CLINIC | Age: 57
End: 2022-03-08
Payer: MEDICAID

## 2022-03-08 VITALS
DIASTOLIC BLOOD PRESSURE: 80 MMHG | TEMPERATURE: 208.94 F | HEART RATE: 98 BPM | RESPIRATION RATE: 18 BRPM | OXYGEN SATURATION: 99 % | SYSTOLIC BLOOD PRESSURE: 124 MMHG

## 2022-03-08 VITALS
DIASTOLIC BLOOD PRESSURE: 74 MMHG | OXYGEN SATURATION: 98 % | SYSTOLIC BLOOD PRESSURE: 128 MMHG | HEART RATE: 88 BPM | TEMPERATURE: 208.76 F | RESPIRATION RATE: 18 BRPM

## 2022-03-08 DIAGNOSIS — L59.8 OTHER SPECIFIED DISORDERS OF THE SKIN AND SUBCUTANEOUS TISSUE RELATED TO RADIATION: ICD-10-CM

## 2022-03-08 DIAGNOSIS — K62.9 DISEASE OF ANUS AND RECTUM, UNSPECIFIED: Chronic | ICD-10-CM

## 2022-03-08 DIAGNOSIS — C18.9 MALIGNANT NEOPLASM OF COLON, UNSPECIFIED: ICD-10-CM

## 2022-03-08 PROCEDURE — G0277: CPT

## 2022-03-08 PROCEDURE — 82962 GLUCOSE BLOOD TEST: CPT

## 2022-03-08 PROCEDURE — 99183 HYPERBARIC OXYGEN THERAPY: CPT

## 2022-03-09 ENCOUNTER — APPOINTMENT (OUTPATIENT)
Dept: HYPERBARIC MEDICINE | Facility: CLINIC | Age: 57
End: 2022-03-09
Payer: MEDICAID

## 2022-03-09 ENCOUNTER — APPOINTMENT (OUTPATIENT)
Dept: HEMATOLOGY ONCOLOGY | Facility: CLINIC | Age: 57
End: 2022-03-09
Payer: MEDICAID

## 2022-03-09 ENCOUNTER — OUTPATIENT (OUTPATIENT)
Dept: OUTPATIENT SERVICES | Facility: HOSPITAL | Age: 57
LOS: 1 days | End: 2022-03-09
Payer: MEDICAID

## 2022-03-09 VITALS
DIASTOLIC BLOOD PRESSURE: 76 MMHG | HEART RATE: 98 BPM | SYSTOLIC BLOOD PRESSURE: 138 MMHG | OXYGEN SATURATION: 99 % | TEMPERATURE: 208.94 F | RESPIRATION RATE: 18 BRPM

## 2022-03-09 VITALS
DIASTOLIC BLOOD PRESSURE: 90 MMHG | OXYGEN SATURATION: 99 % | RESPIRATION RATE: 18 BRPM | SYSTOLIC BLOOD PRESSURE: 134 MMHG | HEART RATE: 88 BPM | TEMPERATURE: 209.66 F

## 2022-03-09 DIAGNOSIS — K62.9 DISEASE OF ANUS AND RECTUM, UNSPECIFIED: Chronic | ICD-10-CM

## 2022-03-09 PROCEDURE — 82962 GLUCOSE BLOOD TEST: CPT

## 2022-03-09 PROCEDURE — 99214 OFFICE O/P EST MOD 30 MIN: CPT | Mod: 95

## 2022-03-09 PROCEDURE — G0277: CPT

## 2022-03-09 PROCEDURE — 99183 HYPERBARIC OXYGEN THERAPY: CPT

## 2022-03-09 RX ORDER — OXYCODONE HYDROCHLORIDE 30 MG/1
30 TABLET ORAL
Qty: 90 | Refills: 0 | Status: DISCONTINUED | COMMUNITY
Start: 2021-11-08 | End: 2022-03-09

## 2022-03-09 RX ORDER — MORPHINE SULFATE 15 MG/1
15 TABLET, FILM COATED, EXTENDED RELEASE ORAL
Qty: 90 | Refills: 0 | Status: DISCONTINUED | COMMUNITY
Start: 2022-01-26 | End: 2022-03-09

## 2022-03-09 NOTE — PROCEDURE
[Outpatient] : Outpatient [Ambulatory] : Patient is ambulatory. [THIS CHAMBER HAS BEEN CLEANED / DISINFECTED] : This chamber has been cleaned / disinfected according to local and hospital policy and procedure prior to this treatment. [____] : Post-Dive: Time - [unfilled] [___] : Post-Dive: Value - [unfilled] mg/dL [I have examined and evaluated this patient today, including ears and lungs and have cleared the patient] : I have examined and evaluated this patient today, including ears and lungs and have cleared the patient to continue HBOT as prescribed.  [Time MD/Provider assessed Patient: _____] : Time MD/Provider assessed Patient: [unfilled] [I have ordered 1 HBOT session at the indicated protocol as seen below] : I have ordered 1 HBOT session at the indicated protocol as seen below [Patient demonstrated and verbalized proper technique for using air break mask] : Patient demonstrated and verbalized proper technique for using air break mask [Patient educated on the risks of SMOKING prior to HBOT with understanding] : Patient educated on the risks of SMOKING prior to HBOT with understanding [Patient educated on the risks of CONSUMING ALCOHOL prior to HBOT with understanding] : Patient educated on the risks of CONSUMING ALCOHOL prior to HBOT with understanding [100% Cotton] : 100% cotton [Empty all pockets] : empty all pockets [No hair oils, wigs, hairpieces, pins] : no hair oils, wigs, hairpieces, pins  [Pre tx medications] : pre tx medications  [No make-up, creams] : no make-up, creams  [No jewelry] : no jewelry  [No matches, cigarettes, lighters] : no matches, cigarettes, lighters  [Hearing aid removed] : hearing aid removed [Dentures removed] : dentures removed [Ground bracelet on pt's wrist] : ground bracelet on pt's wrist  [Contacts removed] : contacts removed  [Remove nail polish] : remove nail polish  [No reading material] : no reading material  [Bra, undergarments removed] : bra, undergarments removed  [No contraindicated dressings] : no contraindicated dressings [Ground Wire - VISUAL Verification - Intact/Free of Obstruction] : Ground Wire - VISUAL Verification - Intact/Free of Obstruction  [Ground Continuity - Verified < 1 ohm w/ Wrist Strap Ernesto] : Ground Continuity - Verified < 1 ohm w/ Wrist Strap Ernesto [Diagnosis: ___] : Diagnosis: [unfilled] [Number: ___] : Number: [unfilled] [Clear all fields] : clear all fields [] : No [FreeTextEntry1] : 2.0 [FreeTextEntry3] : 90 mins [FreeTextEntry7] : 4474 [FreeTextEntry5] : 0071 [FreeTextEntry9] : 2169 [de-identified] : 5488 [de-identified] : 110  Mins/4 units

## 2022-03-10 ENCOUNTER — OUTPATIENT (OUTPATIENT)
Dept: OUTPATIENT SERVICES | Facility: HOSPITAL | Age: 57
LOS: 1 days | End: 2022-03-10
Payer: MEDICAID

## 2022-03-10 ENCOUNTER — APPOINTMENT (OUTPATIENT)
Dept: HYPERBARIC MEDICINE | Facility: CLINIC | Age: 57
End: 2022-03-10
Payer: MEDICAID

## 2022-03-10 VITALS
HEART RATE: 86 BPM | TEMPERATURE: 208.76 F | SYSTOLIC BLOOD PRESSURE: 126 MMHG | RESPIRATION RATE: 18 BRPM | OXYGEN SATURATION: 98 % | DIASTOLIC BLOOD PRESSURE: 78 MMHG

## 2022-03-10 VITALS
TEMPERATURE: 210.02 F | SYSTOLIC BLOOD PRESSURE: 116 MMHG | HEART RATE: 90 BPM | OXYGEN SATURATION: 98 % | DIASTOLIC BLOOD PRESSURE: 76 MMHG | RESPIRATION RATE: 18 BRPM

## 2022-03-10 DIAGNOSIS — L59.8 OTHER SPECIFIED DISORDERS OF THE SKIN AND SUBCUTANEOUS TISSUE RELATED TO RADIATION: ICD-10-CM

## 2022-03-10 DIAGNOSIS — M79.673 PAIN IN UNSPECIFIED FOOT: ICD-10-CM

## 2022-03-10 DIAGNOSIS — K62.9 DISEASE OF ANUS AND RECTUM, UNSPECIFIED: Chronic | ICD-10-CM

## 2022-03-10 PROCEDURE — 82962 GLUCOSE BLOOD TEST: CPT

## 2022-03-10 PROCEDURE — G0277: CPT

## 2022-03-10 PROCEDURE — 99183 HYPERBARIC OXYGEN THERAPY: CPT

## 2022-03-11 ENCOUNTER — APPOINTMENT (OUTPATIENT)
Dept: HYPERBARIC MEDICINE | Facility: CLINIC | Age: 57
End: 2022-03-11
Payer: MEDICAID

## 2022-03-11 ENCOUNTER — OUTPATIENT (OUTPATIENT)
Dept: OUTPATIENT SERVICES | Facility: HOSPITAL | Age: 57
LOS: 1 days | End: 2022-03-11
Payer: MEDICAID

## 2022-03-11 VITALS
DIASTOLIC BLOOD PRESSURE: 78 MMHG | RESPIRATION RATE: 18 BRPM | HEART RATE: 88 BPM | TEMPERATURE: 208.76 F | OXYGEN SATURATION: 98 % | SYSTOLIC BLOOD PRESSURE: 154 MMHG

## 2022-03-11 VITALS
DIASTOLIC BLOOD PRESSURE: 78 MMHG | OXYGEN SATURATION: 98 % | RESPIRATION RATE: 18 BRPM | TEMPERATURE: 208.94 F | HEART RATE: 90 BPM | SYSTOLIC BLOOD PRESSURE: 124 MMHG

## 2022-03-11 DIAGNOSIS — K62.9 DISEASE OF ANUS AND RECTUM, UNSPECIFIED: Chronic | ICD-10-CM

## 2022-03-11 PROCEDURE — 99183 HYPERBARIC OXYGEN THERAPY: CPT

## 2022-03-11 PROCEDURE — G0277: CPT

## 2022-03-11 PROCEDURE — 82962 GLUCOSE BLOOD TEST: CPT

## 2022-03-11 NOTE — REASON FOR VISIT
[Follow-Up] : a follow-up visit [Home] : at home, [unfilled] , at the time of the visit. [Medical Office: ([facility])___] : at the medical office located in  [Verbal consent obtained from patient] : the patient, [unfilled] [Spouse] : spouse

## 2022-03-12 NOTE — ASSESSMENT
[FreeTextEntry1] : 56yoM with:\par \par 1. Anal Cancer - s/p 5400cGy/ 15 fractions completed on 8/16/21 with Mitomycin Day #1. Xeloda discontinued 2/2 GI issues. Follow up with Med Onc, Rad Onc, wound care. \par \par 2. Rectal pain, acute - receiving HBOT.\par - Increase MS ER to 60mg TID. D/c PRN Oxycodone IR 30mg, start Hydromorphone 4-8mg q4h PRN. Will adjust Gabapentin to 100/100/300mg.\par - Co-prescribed intranasal narcan.\par -Appreciate pain management input re: nerve block. Patient to follow up.  \par - Patient instructed to reach out to Dr. Avilez's office ASAP re: rectal bleeding.  Labs ordered.\par \par 3. Anxiety d/o - Follows closely with psychiatry. \par \par 4. Opioid-induced constipation -  C/w Relistor daily.\par \par 5. Encounter for Palliative Care - Emotional support provided.\par \par Follow up in 1 week, call sooner with questions or issues.

## 2022-03-12 NOTE — PROCEDURE
[Outpatient] : Outpatient [Ambulatory] : Patient is ambulatory. [THIS CHAMBER HAS BEEN CLEANED / DISINFECTED] : This chamber has been cleaned / disinfected according to local and hospital policy and procedure prior to this treatment. [____] : Post-Dive: Time - [unfilled] [___] : Post-Dive: Value - [unfilled] mg/dL [I have examined and evaluated this patient today, including ears and lungs and have cleared the patient] : I have examined and evaluated this patient today, including ears and lungs and have cleared the patient to continue HBOT as prescribed.  [Time MD/Provider assessed Patient: _____] : Time MD/Provider assessed Patient: [unfilled] [I have ordered 1 HBOT session at the indicated protocol as seen below] : I have ordered 1 HBOT session at the indicated protocol as seen below [Patient demonstrated and verbalized proper technique for using air break mask] : Patient demonstrated and verbalized proper technique for using air break mask [Patient educated on the risks of SMOKING prior to HBOT with understanding] : Patient educated on the risks of SMOKING prior to HBOT with understanding [Patient educated on the risks of CONSUMING ALCOHOL prior to HBOT with understanding] : Patient educated on the risks of CONSUMING ALCOHOL prior to HBOT with understanding [100% Cotton] : 100% cotton [Empty all pockets] : empty all pockets [No hair oils, wigs, hairpieces, pins] : no hair oils, wigs, hairpieces, pins  [Pre tx medications] : pre tx medications  [No make-up, creams] : no make-up, creams  [No jewelry] : no jewelry  [No matches, cigarettes, lighters] : no matches, cigarettes, lighters  [Hearing aid removed] : hearing aid removed [Dentures removed] : dentures removed [Ground bracelet on pt's wrist] : ground bracelet on pt's wrist  [Contacts removed] : contacts removed  [Remove nail polish] : remove nail polish  [No reading material] : no reading material  [Bra, undergarments removed] : bra, undergarments removed  [No contraindicated dressings] : no contraindicated dressings [Ground Wire - VISUAL Verification - Intact/Free of Obstruction] : Ground Wire - VISUAL Verification - Intact/Free of Obstruction  [Ground Continuity - Verified < 1 ohm w/ Wrist Strap Ernesto] : Ground Continuity - Verified < 1 ohm w/ Wrist Strap Ernesto [Diagnosis: ___] : Diagnosis: [unfilled] [Number: ___] : Number: [unfilled] [Clear all fields] : clear all fields [] : No [FreeTextEntry1] : 2.0 [FreeTextEntry3] : 90 mins [FreeTextEntry5] : 7007 [FreeTextEntry7] : 4479 [FreeTextEntry9] : 9421 [de-identified] : 7621 [de-identified] : 110  Mins/4 units

## 2022-03-12 NOTE — END OF VISIT
[FreeTextEntry3] : Pain remains high despite current opioid regimen. Will continue to titrate up dose slowly to meet patient's pain requirements.

## 2022-03-12 NOTE — PROCEDURE
[Outpatient] : Outpatient [Ambulatory] : Patient is ambulatory. [THIS CHAMBER HAS BEEN CLEANED / DISINFECTED] : This chamber has been cleaned / disinfected according to local and hospital policy and procedure prior to this treatment. [____] : Post-Dive: Time - [unfilled] [___] : Post-Dive: Value - [unfilled] mg/dL [I have examined and evaluated this patient today, including ears and lungs and have cleared the patient] : I have examined and evaluated this patient today, including ears and lungs and have cleared the patient to continue HBOT as prescribed.  [Time MD/Provider assessed Patient: _____] : Time MD/Provider assessed Patient: [unfilled] [I have ordered 1 HBOT session at the indicated protocol as seen below] : I have ordered 1 HBOT session at the indicated protocol as seen below [Patient demonstrated and verbalized proper technique for using air break mask] : Patient demonstrated and verbalized proper technique for using air break mask [Patient educated on the risks of SMOKING prior to HBOT with understanding] : Patient educated on the risks of SMOKING prior to HBOT with understanding [Patient educated on the risks of CONSUMING ALCOHOL prior to HBOT with understanding] : Patient educated on the risks of CONSUMING ALCOHOL prior to HBOT with understanding [100% Cotton] : 100% cotton [Empty all pockets] : empty all pockets [No hair oils, wigs, hairpieces, pins] : no hair oils, wigs, hairpieces, pins  [Pre tx medications] : pre tx medications  [No make-up, creams] : no make-up, creams  [No jewelry] : no jewelry  [No matches, cigarettes, lighters] : no matches, cigarettes, lighters  [Hearing aid removed] : hearing aid removed [Dentures removed] : dentures removed [Ground bracelet on pt's wrist] : ground bracelet on pt's wrist  [Contacts removed] : contacts removed  [Remove nail polish] : remove nail polish  [No reading material] : no reading material  [Bra, undergarments removed] : bra, undergarments removed  [No contraindicated dressings] : no contraindicated dressings [Ground Wire - VISUAL Verification - Intact/Free of Obstruction] : Ground Wire - VISUAL Verification - Intact/Free of Obstruction  [Ground Continuity - Verified < 1 ohm w/ Wrist Strap Ernesto] : Ground Continuity - Verified < 1 ohm w/ Wrist Strap Ernesto [Diagnosis: ___] : Diagnosis: [unfilled] [Number: ___] : Number: [unfilled] [Clear all fields] : clear all fields [] : No [FreeTextEntry1] : 2.0 [FreeTextEntry3] : 90 mins [FreeTextEntry5] : 1666 [facility] 6302 [FreeTextEntry9] : 1648 [de-identified] : 6725 [de-identified] : 110  Mins/4 units

## 2022-03-12 NOTE — PROCEDURE
[Outpatient] : Outpatient [Ambulatory] : Patient is ambulatory. [THIS CHAMBER HAS BEEN CLEANED / DISINFECTED] : This chamber has been cleaned / disinfected according to local and hospital policy and procedure prior to this treatment. [____] : Post-Dive: Time - [unfilled] [___] : Post-Dive: Value - [unfilled] mg/dL [I have examined and evaluated this patient today, including ears and lungs and have cleared the patient] : I have examined and evaluated this patient today, including ears and lungs and have cleared the patient to continue HBOT as prescribed.  [Time MD/Provider assessed Patient: _____] : Time MD/Provider assessed Patient: [unfilled] [I have ordered 1 HBOT session at the indicated protocol as seen below] : I have ordered 1 HBOT session at the indicated protocol as seen below [Patient demonstrated and verbalized proper technique for using air break mask] : Patient demonstrated and verbalized proper technique for using air break mask [Patient educated on the risks of SMOKING prior to HBOT with understanding] : Patient educated on the risks of SMOKING prior to HBOT with understanding [Patient educated on the risks of CONSUMING ALCOHOL prior to HBOT with understanding] : Patient educated on the risks of CONSUMING ALCOHOL prior to HBOT with understanding [100% Cotton] : 100% cotton [Empty all pockets] : empty all pockets [No hair oils, wigs, hairpieces, pins] : no hair oils, wigs, hairpieces, pins  [Pre tx medications] : pre tx medications  [No make-up, creams] : no make-up, creams  [No jewelry] : no jewelry  [No matches, cigarettes, lighters] : no matches, cigarettes, lighters  [Hearing aid removed] : hearing aid removed [Dentures removed] : dentures removed [Ground bracelet on pt's wrist] : ground bracelet on pt's wrist  [Contacts removed] : contacts removed  [Remove nail polish] : remove nail polish  [No reading material] : no reading material  [Bra, undergarments removed] : bra, undergarments removed  [No contraindicated dressings] : no contraindicated dressings [Ground Wire - VISUAL Verification - Intact/Free of Obstruction] : Ground Wire - VISUAL Verification - Intact/Free of Obstruction  [Ground Continuity - Verified < 1 ohm w/ Wrist Strap Ernesto] : Ground Continuity - Verified < 1 ohm w/ Wrist Strap Ernesto [Clear all fields] : clear all fields [Diagnosis: ___] : Diagnosis: [unfilled] [Number: ___] : Number: [unfilled] [] : No [FreeTextEntry1] : 2.0 [FreeTextEntry3] : 90 mins [FreeTextEntry5] : 7148 [FreeTextEntry7] : 0627 [FreeTextEntry9] : 4248 [de-identified] : 8343 [de-identified] : 110  Mins/4 units

## 2022-03-12 NOTE — HISTORY OF PRESENT ILLNESS
[FreeTextEntry1] : 56yoF with anal cancer presents for follow-up palliative care visit, referred by Oncology.  \par PMH significant for prostate cancer s/p RT in 2016, HIV on Biktarvy.\par \par In 2020 at age of 55 patient had his screening colonoscopy performed by Dr. Lawrence in June 2020 that as per patient was informed to be within normal limits. Subsequently patient had felt a bump in the perirectal area and at the time was told by GI that this could be a hemorrhoid and was treated as such.\par His symptoms did improve and was eventually referred to Dr. Avilez for further evaluation and in March 2021 patient was seen by Dr. Avilez and underwent an exam and biopsy in April that revealed evidence of anal squamous cell carcinoma. Patient was subsequently referred for evaluation and treatment. Patient did not have full excision of the tumor rather a excisional biopsy.\par \par Main reason for which patient is referred is pain which has been an issue for him for the last 3 months. The pain is localized to the anus/rectum. It is severe, not well controlled. Worse with defecation, sitting. At its worst the pain is 10/10. Rates it as 8/10 presently. \par \par He is currently using Oxycodone IR 20mg PRN, takes this every 6 hours. He was previously tried on transdermal fentanyl 25mcg/hr and oxycontin but he states that neither helped his pain. He no longer uses either. \par Has tried using anusol suppository without relief. \par \par Underwent EUA by Dr. Avilez on 11/26/21 for evaluation of persistent kandy-anal pain and a 4 x 4 cm ulcer was found but no fissure or fistula. A biopsy was performed and was negative for dysplasia or malignancy.  \par \par Interval History:\par Patient presents for follow-up, seen via TeleMedicine. He is on week 4 of HBOT, total of 6 weeks is the duration of treatment. \par \par Rectal pain remains severe. He has been taking MS ER 45mg q6h and Oxycodone IR  30mg every 4 hours but the pain has heightened.  Pain gets as high as 10/10, as low as 6/10 for approximately 3 hours after PRN use. He states that pain is worse with BM's and with standing for extended periods of time. He also performs Sitz bathes 2-3 times a day which provide relief while in the bath only. \par \par Met with telepain service, gabapentin 300mg QHS was prescribed, he trialed but stopped as he found no effect.\par \par ROS:\par + appetite is good\par + hot flushes from prostate ca therapy\par +constipation - prune juice and relistor daily\par +anxiety d/o - uses alprazolam 1mg PRN, olanzapine 10mg QHS\par +insomnia - is on long-standing ambien 10mg QHS \par Denies nausea/vomiting.\par All other ROS as outlined or noncontributory. \par \par Patient is , lives with his wife. \par He is not presently driving, uses a car service to get to medical appointments. \par Quit smoking tobacco about two years ago. No EtOH. \par Passes time by watching TV. \par \par Psychiatrist: Dr. Eric Yin ([facility])\par Urologist: Dr. Jung \par \par I-Stop Ref#:  [id number]

## 2022-03-14 ENCOUNTER — OUTPATIENT (OUTPATIENT)
Dept: OUTPATIENT SERVICES | Facility: HOSPITAL | Age: 57
LOS: 1 days | End: 2022-03-14
Payer: MEDICAID

## 2022-03-14 ENCOUNTER — APPOINTMENT (OUTPATIENT)
Dept: HYPERBARIC MEDICINE | Facility: CLINIC | Age: 57
End: 2022-03-14
Payer: MEDICAID

## 2022-03-14 VITALS
SYSTOLIC BLOOD PRESSURE: 134 MMHG | HEART RATE: 88 BPM | RESPIRATION RATE: 18 BRPM | DIASTOLIC BLOOD PRESSURE: 78 MMHG | OXYGEN SATURATION: 98 % | TEMPERATURE: 208.76 F

## 2022-03-14 DIAGNOSIS — M79.673 PAIN IN UNSPECIFIED FOOT: ICD-10-CM

## 2022-03-14 DIAGNOSIS — K62.9 DISEASE OF ANUS AND RECTUM, UNSPECIFIED: Chronic | ICD-10-CM

## 2022-03-14 PROCEDURE — 82962 GLUCOSE BLOOD TEST: CPT

## 2022-03-14 PROCEDURE — G0277: CPT

## 2022-03-14 PROCEDURE — 99183 HYPERBARIC OXYGEN THERAPY: CPT

## 2022-03-14 NOTE — REASON FOR VISIT
[Follow-Up] : a follow-up visit [Home] : at home, [unfilled] , at the time of the visit. [Medical Office: ([facility])___] : at the medical office located in  [Verbal consent obtained from patient] : the patient, [unfilled]

## 2022-03-14 NOTE — ADDENDUM
[FreeTextEntry1] : Pt's BGL low. MD notified.as per order  Pt given 23g of glucose via Apple juice prior to tx and dive for tx.\par \par Pt descended to 2.0 ANALIA @ 1.5 PSI/min without incident in chamber #3\par Pt resting @ depth with chest rise and fall observed throughout tx. \par Pt ascended from 2.0 ANALIA @ 1.5 PSI/min without incident. \par Pt tolerated tx well.\par \par \par \par

## 2022-03-14 NOTE — PROCEDURE
[Outpatient] : Outpatient [Ambulatory] : Patient is ambulatory. [THIS CHAMBER HAS BEEN CLEANED / DISINFECTED] : This chamber has been cleaned / disinfected according to local and hospital policy and procedure prior to this treatment. [____] : Post-Dive: Time - [unfilled] [___] : Post-Dive: Value - [unfilled] mg/dL [I have examined and evaluated this patient today, including ears and lungs and have cleared the patient] : I have examined and evaluated this patient today, including ears and lungs and have cleared the patient to continue HBOT as prescribed.  [Time MD/Provider assessed Patient: _____] : Time MD/Provider assessed Patient: [unfilled] [I have ordered 1 HBOT session at the indicated protocol as seen below] : I have ordered 1 HBOT session at the indicated protocol as seen below [Patient demonstrated and verbalized proper technique for using air break mask] : Patient demonstrated and verbalized proper technique for using air break mask [Patient educated on the risks of SMOKING prior to HBOT with understanding] : Patient educated on the risks of SMOKING prior to HBOT with understanding [Patient educated on the risks of CONSUMING ALCOHOL prior to HBOT with understanding] : Patient educated on the risks of CONSUMING ALCOHOL prior to HBOT with understanding [100% Cotton] : 100% cotton [Empty all pockets] : empty all pockets [No hair oils, wigs, hairpieces, pins] : no hair oils, wigs, hairpieces, pins  [Pre tx medications] : pre tx medications  [No make-up, creams] : no make-up, creams  [No jewelry] : no jewelry  [No matches, cigarettes, lighters] : no matches, cigarettes, lighters  [Hearing aid removed] : hearing aid removed [Dentures removed] : dentures removed [Ground bracelet on pt's wrist] : ground bracelet on pt's wrist  [Contacts removed] : contacts removed  [Remove nail polish] : remove nail polish  [No reading material] : no reading material  [Bra, undergarments removed] : bra, undergarments removed  [No contraindicated dressings] : no contraindicated dressings [Ground Wire - VISUAL Verification - Intact/Free of Obstruction] : Ground Wire - VISUAL Verification - Intact/Free of Obstruction  [Ground Continuity - Verified < 1 ohm w/ Wrist Strap Ernesto] : Ground Continuity - Verified < 1 ohm w/ Wrist Strap Ernesto [Diagnosis: ___] : Diagnosis: [unfilled] [Number: ___] : Number: [unfilled] [Clear all fields] : clear all fields [] : No [FreeTextEntry1] : 2.0 [FreeTextEntry3] : 90 mins [FreeTextEntry5] : 9225 [FreeTextEntry7] : 6961 [FreeTextEntry9] : 9302 [de-identified] : 9046 [de-identified] : 110  Mins/4 units

## 2022-03-15 ENCOUNTER — OUTPATIENT (OUTPATIENT)
Dept: OUTPATIENT SERVICES | Facility: HOSPITAL | Age: 57
LOS: 1 days | End: 2022-03-15
Payer: MEDICAID

## 2022-03-15 ENCOUNTER — APPOINTMENT (OUTPATIENT)
Dept: HEMATOLOGY ONCOLOGY | Facility: CLINIC | Age: 57
End: 2022-03-15
Payer: MEDICAID

## 2022-03-15 ENCOUNTER — APPOINTMENT (OUTPATIENT)
Dept: HYPERBARIC MEDICINE | Facility: CLINIC | Age: 57
End: 2022-03-15
Payer: MEDICAID

## 2022-03-15 VITALS
TEMPERATURE: 208.94 F | DIASTOLIC BLOOD PRESSURE: 84 MMHG | SYSTOLIC BLOOD PRESSURE: 136 MMHG | RESPIRATION RATE: 18 BRPM | OXYGEN SATURATION: 98 % | HEART RATE: 88 BPM

## 2022-03-15 VITALS
OXYGEN SATURATION: 98 % | SYSTOLIC BLOOD PRESSURE: 128 MMHG | TEMPERATURE: 208.76 F | DIASTOLIC BLOOD PRESSURE: 80 MMHG | HEART RATE: 102 BPM | RESPIRATION RATE: 18 BRPM

## 2022-03-15 DIAGNOSIS — L59.8 OTHER SPECIFIED DISORDERS OF THE SKIN AND SUBCUTANEOUS TISSUE RELATED TO RADIATION: ICD-10-CM

## 2022-03-15 DIAGNOSIS — K62.9 DISEASE OF ANUS AND RECTUM, UNSPECIFIED: Chronic | ICD-10-CM

## 2022-03-15 PROCEDURE — G0277: CPT

## 2022-03-15 PROCEDURE — 99214 OFFICE O/P EST MOD 30 MIN: CPT | Mod: 95

## 2022-03-15 PROCEDURE — 99183 HYPERBARIC OXYGEN THERAPY: CPT

## 2022-03-15 PROCEDURE — 82962 GLUCOSE BLOOD TEST: CPT

## 2022-03-15 NOTE — PROCEDURE
[Outpatient] : Outpatient [Ambulatory] : Patient is ambulatory. [THIS CHAMBER HAS BEEN CLEANED / DISINFECTED] : This chamber has been cleaned / disinfected according to local and hospital policy and procedure prior to this treatment. [____] : Post-Dive: Time - [unfilled] [___] : Post-Dive: Value - [unfilled] mg/dL [I have examined and evaluated this patient today, including ears and lungs and have cleared the patient] : I have examined and evaluated this patient today, including ears and lungs and have cleared the patient to continue HBOT as prescribed.  [Time MD/Provider assessed Patient: _____] : Time MD/Provider assessed Patient: [unfilled] [I have ordered 1 HBOT session at the indicated protocol as seen below] : I have ordered 1 HBOT session at the indicated protocol as seen below [Patient demonstrated and verbalized proper technique for using air break mask] : Patient demonstrated and verbalized proper technique for using air break mask [Patient educated on the risks of SMOKING prior to HBOT with understanding] : Patient educated on the risks of SMOKING prior to HBOT with understanding [Patient educated on the risks of CONSUMING ALCOHOL prior to HBOT with understanding] : Patient educated on the risks of CONSUMING ALCOHOL prior to HBOT with understanding [100% Cotton] : 100% cotton [Empty all pockets] : empty all pockets [No hair oils, wigs, hairpieces, pins] : no hair oils, wigs, hairpieces, pins  [Pre tx medications] : pre tx medications  [No make-up, creams] : no make-up, creams  [No jewelry] : no jewelry  [No matches, cigarettes, lighters] : no matches, cigarettes, lighters  [Hearing aid removed] : hearing aid removed [Dentures removed] : dentures removed [Ground bracelet on pt's wrist] : ground bracelet on pt's wrist  [Contacts removed] : contacts removed  [Remove nail polish] : remove nail polish  [No reading material] : no reading material  [Bra, undergarments removed] : bra, undergarments removed  [No contraindicated dressings] : no contraindicated dressings [Ground Wire - VISUAL Verification - Intact/Free of Obstruction] : Ground Wire - VISUAL Verification - Intact/Free of Obstruction  [Ground Continuity - Verified < 1 ohm w/ Wrist Strap Ernesto] : Ground Continuity - Verified < 1 ohm w/ Wrist Strap Ernesto [Diagnosis: ___] : Diagnosis: [unfilled] [Number: ___] : Number: [unfilled] [Clear all fields] : clear all fields [] : No [FreeTextEntry1] : 2.0 [FreeTextEntry3] : 90 mins [FreeTextEntry5] : 7539 [FreeTextEntry9] : 2447 [FreeTextEntry7] : 1207 [de-identified] : 9233 [de-identified] : 110  Mins/4 units

## 2022-03-16 ENCOUNTER — OUTPATIENT (OUTPATIENT)
Dept: OUTPATIENT SERVICES | Facility: HOSPITAL | Age: 57
LOS: 1 days | End: 2022-03-16
Payer: MEDICAID

## 2022-03-16 ENCOUNTER — APPOINTMENT (OUTPATIENT)
Dept: HYPERBARIC MEDICINE | Facility: CLINIC | Age: 57
End: 2022-03-16
Payer: MEDICAID

## 2022-03-16 VITALS
HEART RATE: 88 BPM | RESPIRATION RATE: 18 BRPM | TEMPERATURE: 209.48 F | OXYGEN SATURATION: 99 % | DIASTOLIC BLOOD PRESSURE: 90 MMHG | SYSTOLIC BLOOD PRESSURE: 136 MMHG

## 2022-03-16 VITALS
SYSTOLIC BLOOD PRESSURE: 111 MMHG | RESPIRATION RATE: 18 BRPM | OXYGEN SATURATION: 98 % | DIASTOLIC BLOOD PRESSURE: 74 MMHG | TEMPERATURE: 209.66 F | HEART RATE: 98 BPM

## 2022-03-16 DIAGNOSIS — M79.673 PAIN IN UNSPECIFIED FOOT: ICD-10-CM

## 2022-03-16 DIAGNOSIS — L59.8 OTHER SPECIFIED DISORDERS OF THE SKIN AND SUBCUTANEOUS TISSUE RELATED TO RADIATION: ICD-10-CM

## 2022-03-16 DIAGNOSIS — K62.9 DISEASE OF ANUS AND RECTUM, UNSPECIFIED: Chronic | ICD-10-CM

## 2022-03-16 PROCEDURE — 99183 HYPERBARIC OXYGEN THERAPY: CPT

## 2022-03-16 PROCEDURE — G0277: CPT

## 2022-03-16 PROCEDURE — 82962 GLUCOSE BLOOD TEST: CPT

## 2022-03-16 NOTE — PROCEDURE
[Outpatient] : Outpatient [Ambulatory] : Patient is ambulatory. [THIS CHAMBER HAS BEEN CLEANED / DISINFECTED] : This chamber has been cleaned / disinfected according to local and hospital policy and procedure prior to this treatment. [____] : Post-Dive: Time - [unfilled] [___] : Post-Dive: Value - [unfilled] mg/dL [I have examined and evaluated this patient today, including ears and lungs and have cleared the patient] : I have examined and evaluated this patient today, including ears and lungs and have cleared the patient to continue HBOT as prescribed.  [Time MD/Provider assessed Patient: _____] : Time MD/Provider assessed Patient: [unfilled] [I have ordered 1 HBOT session at the indicated protocol as seen below] : I have ordered 1 HBOT session at the indicated protocol as seen below [Patient demonstrated and verbalized proper technique for using air break mask] : Patient demonstrated and verbalized proper technique for using air break mask [Patient educated on the risks of SMOKING prior to HBOT with understanding] : Patient educated on the risks of SMOKING prior to HBOT with understanding [Patient educated on the risks of CONSUMING ALCOHOL prior to HBOT with understanding] : Patient educated on the risks of CONSUMING ALCOHOL prior to HBOT with understanding [100% Cotton] : 100% cotton [Empty all pockets] : empty all pockets [No hair oils, wigs, hairpieces, pins] : no hair oils, wigs, hairpieces, pins  [Pre tx medications] : pre tx medications  [No make-up, creams] : no make-up, creams  [No jewelry] : no jewelry  [No matches, cigarettes, lighters] : no matches, cigarettes, lighters  [Hearing aid removed] : hearing aid removed [Dentures removed] : dentures removed [Ground bracelet on pt's wrist] : ground bracelet on pt's wrist  [Contacts removed] : contacts removed  [Remove nail polish] : remove nail polish  [No reading material] : no reading material  [Bra, undergarments removed] : bra, undergarments removed  [No contraindicated dressings] : no contraindicated dressings [Ground Wire - VISUAL Verification - Intact/Free of Obstruction] : Ground Wire - VISUAL Verification - Intact/Free of Obstruction  [Ground Continuity - Verified < 1 ohm w/ Wrist Strap Ernesto] : Ground Continuity - Verified < 1 ohm w/ Wrist Strap Ernesto [Diagnosis: ___] : Diagnosis: [unfilled] [Number: ___] : Number: [unfilled] [Clear all fields] : clear all fields [] : No [FreeTextEntry1] : 2.0 [FreeTextEntry3] : 90 mins [FreeTextEntry5] : 8137 [FreeB[facility] Scott & White [facility] PflugervilletEntry7] : 8247 [FreeTextEntry9] : 9867 [de-identified] : 5547 [de-identified] : 110  Mins/4 units

## 2022-03-17 ENCOUNTER — APPOINTMENT (OUTPATIENT)
Dept: HYPERBARIC MEDICINE | Facility: CLINIC | Age: 57
End: 2022-03-17

## 2022-03-17 ENCOUNTER — INPATIENT (INPATIENT)
Facility: HOSPITAL | Age: 57
LOS: 15 days | Discharge: HOME | End: 2022-04-02
Attending: INTERNAL MEDICINE | Admitting: INTERNAL MEDICINE
Payer: MEDICAID

## 2022-03-17 ENCOUNTER — NON-APPOINTMENT (OUTPATIENT)
Age: 57
End: 2022-03-17

## 2022-03-17 VITALS
OXYGEN SATURATION: 94 % | DIASTOLIC BLOOD PRESSURE: 73 MMHG | HEIGHT: 71 IN | RESPIRATION RATE: 22 BRPM | HEART RATE: 113 BPM | SYSTOLIC BLOOD PRESSURE: 102 MMHG | TEMPERATURE: 99 F

## 2022-03-17 DIAGNOSIS — K62.9 DISEASE OF ANUS AND RECTUM, UNSPECIFIED: Chronic | ICD-10-CM

## 2022-03-17 DIAGNOSIS — L59.8 OTHER SPECIFIED DISORDERS OF THE SKIN AND SUBCUTANEOUS TISSUE RELATED TO RADIATION: ICD-10-CM

## 2022-03-17 DIAGNOSIS — A41.9 SEPSIS, UNSPECIFIED ORGANISM: ICD-10-CM

## 2022-03-17 LAB
ALBUMIN SERPL ELPH-MCNC: 3.6 G/DL — SIGNIFICANT CHANGE UP (ref 3.3–5)
ALP SERPL-CCNC: 83 U/L — SIGNIFICANT CHANGE UP (ref 40–120)
ALT FLD-CCNC: 10 U/L — SIGNIFICANT CHANGE UP (ref 4–41)
ANION GAP SERPL CALC-SCNC: 13 MMOL/L — SIGNIFICANT CHANGE UP (ref 7–14)
APPEARANCE UR: CLEAR — SIGNIFICANT CHANGE UP
APTT BLD: 31.4 SEC — SIGNIFICANT CHANGE UP (ref 27–36.3)
AST SERPL-CCNC: 16 U/L — SIGNIFICANT CHANGE UP (ref 4–40)
B PERT DNA SPEC QL NAA+PROBE: SIGNIFICANT CHANGE UP
B PERT+PARAPERT DNA PNL SPEC NAA+PROBE: SIGNIFICANT CHANGE UP
BASOPHILS # BLD AUTO: 0.02 K/UL — SIGNIFICANT CHANGE UP (ref 0–0.2)
BASOPHILS NFR BLD AUTO: 0.2 % — SIGNIFICANT CHANGE UP (ref 0–2)
BILIRUB SERPL-MCNC: 0.3 MG/DL — SIGNIFICANT CHANGE UP (ref 0.2–1.2)
BILIRUB UR-MCNC: NEGATIVE — SIGNIFICANT CHANGE UP
BLD GP AB SCN SERPL QL: NEGATIVE — SIGNIFICANT CHANGE UP
BLOOD GAS VENOUS COMPREHENSIVE RESULT: SIGNIFICANT CHANGE UP
BORDETELLA PARAPERTUSSIS (RAPRVP): SIGNIFICANT CHANGE UP
BUN SERPL-MCNC: 13 MG/DL — SIGNIFICANT CHANGE UP (ref 7–23)
C PNEUM DNA SPEC QL NAA+PROBE: SIGNIFICANT CHANGE UP
CALCIUM SERPL-MCNC: 9.7 MG/DL — SIGNIFICANT CHANGE UP (ref 8.4–10.5)
CHLORIDE SERPL-SCNC: 101 MMOL/L — SIGNIFICANT CHANGE UP (ref 98–107)
CO2 SERPL-SCNC: 26 MMOL/L — SIGNIFICANT CHANGE UP (ref 22–31)
COLOR SPEC: YELLOW — SIGNIFICANT CHANGE UP
CREAT SERPL-MCNC: 0.82 MG/DL — SIGNIFICANT CHANGE UP (ref 0.5–1.3)
DIFF PNL FLD: NEGATIVE — SIGNIFICANT CHANGE UP
EGFR: 104 ML/MIN/1.73M2 — SIGNIFICANT CHANGE UP
EOSINOPHIL # BLD AUTO: 0.01 K/UL — SIGNIFICANT CHANGE UP (ref 0–0.5)
EOSINOPHIL NFR BLD AUTO: 0.1 % — SIGNIFICANT CHANGE UP (ref 0–6)
FLUAV SUBTYP SPEC NAA+PROBE: SIGNIFICANT CHANGE UP
FLUBV RNA SPEC QL NAA+PROBE: SIGNIFICANT CHANGE UP
GLUCOSE BLDC GLUCOMTR-MCNC: 115 MG/DL — HIGH (ref 70–99)
GLUCOSE SERPL-MCNC: 104 MG/DL — HIGH (ref 70–99)
GLUCOSE UR QL: NEGATIVE — SIGNIFICANT CHANGE UP
HADV DNA SPEC QL NAA+PROBE: SIGNIFICANT CHANGE UP
HCOV 229E RNA SPEC QL NAA+PROBE: SIGNIFICANT CHANGE UP
HCOV HKU1 RNA SPEC QL NAA+PROBE: SIGNIFICANT CHANGE UP
HCOV NL63 RNA SPEC QL NAA+PROBE: SIGNIFICANT CHANGE UP
HCOV OC43 RNA SPEC QL NAA+PROBE: SIGNIFICANT CHANGE UP
HCT VFR BLD CALC: 36.2 % — LOW (ref 39–50)
HGB BLD-MCNC: 11.4 G/DL — LOW (ref 13–17)
HMPV RNA SPEC QL NAA+PROBE: SIGNIFICANT CHANGE UP
HPIV1 RNA SPEC QL NAA+PROBE: SIGNIFICANT CHANGE UP
HPIV2 RNA SPEC QL NAA+PROBE: SIGNIFICANT CHANGE UP
HPIV3 RNA SPEC QL NAA+PROBE: SIGNIFICANT CHANGE UP
HPIV4 RNA SPEC QL NAA+PROBE: SIGNIFICANT CHANGE UP
IANC: 5.93 K/UL — SIGNIFICANT CHANGE UP (ref 1.5–8.5)
IMM GRANULOCYTES NFR BLD AUTO: 0.7 % — SIGNIFICANT CHANGE UP (ref 0–1.5)
INR BLD: 1.26 RATIO — HIGH (ref 0.88–1.16)
KETONES UR-MCNC: NEGATIVE — SIGNIFICANT CHANGE UP
LEUKOCYTE ESTERASE UR-ACNC: NEGATIVE — SIGNIFICANT CHANGE UP
LYMPHOCYTES # BLD AUTO: 1.46 K/UL — SIGNIFICANT CHANGE UP (ref 1–3.3)
LYMPHOCYTES # BLD AUTO: 17.1 % — SIGNIFICANT CHANGE UP (ref 13–44)
M PNEUMO DNA SPEC QL NAA+PROBE: SIGNIFICANT CHANGE UP
MCHC RBC-ENTMCNC: 27.9 PG — SIGNIFICANT CHANGE UP (ref 27–34)
MCHC RBC-ENTMCNC: 31.5 GM/DL — LOW (ref 32–36)
MCV RBC AUTO: 88.7 FL — SIGNIFICANT CHANGE UP (ref 80–100)
MONOCYTES # BLD AUTO: 1.08 K/UL — HIGH (ref 0–0.9)
MONOCYTES NFR BLD AUTO: 12.6 % — SIGNIFICANT CHANGE UP (ref 2–14)
NEUTROPHILS # BLD AUTO: 5.93 K/UL — SIGNIFICANT CHANGE UP (ref 1.8–7.4)
NEUTROPHILS NFR BLD AUTO: 69.3 % — SIGNIFICANT CHANGE UP (ref 43–77)
NITRITE UR-MCNC: NEGATIVE — SIGNIFICANT CHANGE UP
NRBC # BLD: 0 /100 WBCS — SIGNIFICANT CHANGE UP
NRBC # FLD: 0 K/UL — SIGNIFICANT CHANGE UP
PH UR: 6.5 — SIGNIFICANT CHANGE UP (ref 5–8)
PLATELET # BLD AUTO: 413 K/UL — HIGH (ref 150–400)
POTASSIUM SERPL-MCNC: 4.1 MMOL/L — SIGNIFICANT CHANGE UP (ref 3.5–5.3)
POTASSIUM SERPL-SCNC: 4.1 MMOL/L — SIGNIFICANT CHANGE UP (ref 3.5–5.3)
PROT SERPL-MCNC: 7.3 G/DL — SIGNIFICANT CHANGE UP (ref 6–8.3)
PROT UR-MCNC: ABNORMAL
PROTHROM AB SERPL-ACNC: 14.7 SEC — HIGH (ref 10.5–13.4)
RAPID RVP RESULT: SIGNIFICANT CHANGE UP
RBC # BLD: 4.08 M/UL — LOW (ref 4.2–5.8)
RBC # FLD: 13.4 % — SIGNIFICANT CHANGE UP (ref 10.3–14.5)
RH IG SCN BLD-IMP: NEGATIVE — SIGNIFICANT CHANGE UP
RSV RNA SPEC QL NAA+PROBE: SIGNIFICANT CHANGE UP
RV+EV RNA SPEC QL NAA+PROBE: SIGNIFICANT CHANGE UP
SARS-COV-2 RNA SPEC QL NAA+PROBE: SIGNIFICANT CHANGE UP
SODIUM SERPL-SCNC: 140 MMOL/L — SIGNIFICANT CHANGE UP (ref 135–145)
SP GR SPEC: 1.02 — SIGNIFICANT CHANGE UP (ref 1–1.05)
UROBILINOGEN FLD QL: SIGNIFICANT CHANGE UP
WBC # BLD: 8.56 K/UL — SIGNIFICANT CHANGE UP (ref 3.8–10.5)
WBC # FLD AUTO: 8.56 K/UL — SIGNIFICANT CHANGE UP (ref 3.8–10.5)

## 2022-03-17 PROCEDURE — 99223 1ST HOSP IP/OBS HIGH 75: CPT | Mod: GC

## 2022-03-17 PROCEDURE — 74177 CT ABD & PELVIS W/CONTRAST: CPT | Mod: 26,MA

## 2022-03-17 PROCEDURE — 99285 EMERGENCY DEPT VISIT HI MDM: CPT

## 2022-03-17 RX ORDER — MORPHINE SULFATE 50 MG/1
60 CAPSULE, EXTENDED RELEASE ORAL EVERY 8 HOURS
Refills: 0 | Status: DISCONTINUED | OUTPATIENT
Start: 2022-03-17 | End: 2022-03-23

## 2022-03-17 RX ORDER — HYDROMORPHONE HYDROCHLORIDE 2 MG/ML
0.5 INJECTION INTRAMUSCULAR; INTRAVENOUS; SUBCUTANEOUS ONCE
Refills: 0 | Status: DISCONTINUED | OUTPATIENT
Start: 2022-03-17 | End: 2022-03-17

## 2022-03-17 RX ORDER — BICTEGRAVIR SODIUM, EMTRICITABINE, AND TENOFOVIR ALAFENAMIDE FUMARATE 30; 120; 15 MG/1; MG/1; MG/1
1 TABLET ORAL DAILY
Refills: 0 | Status: DISCONTINUED | OUTPATIENT
Start: 2022-03-18 | End: 2022-03-30

## 2022-03-17 RX ORDER — LOSARTAN POTASSIUM 100 MG/1
50 TABLET, FILM COATED ORAL DAILY
Refills: 0 | Status: DISCONTINUED | OUTPATIENT
Start: 2022-03-18 | End: 2022-03-18

## 2022-03-17 RX ORDER — DOCUSATE SODIUM 100 MG
0 CAPSULE ORAL
Qty: 0 | Refills: 0 | DISCHARGE

## 2022-03-17 RX ORDER — ZOLPIDEM TARTRATE 10 MG/1
5 TABLET ORAL AT BEDTIME
Refills: 0 | Status: DISCONTINUED | OUTPATIENT
Start: 2022-03-17 | End: 2022-03-24

## 2022-03-17 RX ORDER — SODIUM CHLORIDE 9 MG/ML
1000 INJECTION, SOLUTION INTRAVENOUS ONCE
Refills: 0 | Status: COMPLETED | OUTPATIENT
Start: 2022-03-17 | End: 2022-03-17

## 2022-03-17 RX ORDER — OLANZAPINE 15 MG/1
1 TABLET, FILM COATED ORAL
Qty: 0 | Refills: 0 | DISCHARGE

## 2022-03-17 RX ORDER — PIPERACILLIN AND TAZOBACTAM 4; .5 G/20ML; G/20ML
3.38 INJECTION, POWDER, LYOPHILIZED, FOR SOLUTION INTRAVENOUS ONCE
Refills: 0 | Status: COMPLETED | OUTPATIENT
Start: 2022-03-17 | End: 2022-03-17

## 2022-03-17 RX ORDER — SENNA PLUS 8.6 MG/1
2 TABLET ORAL
Refills: 0 | Status: DISCONTINUED | OUTPATIENT
Start: 2022-03-17 | End: 2022-03-30

## 2022-03-17 RX ORDER — ASPIRIN/CALCIUM CARB/MAGNESIUM 324 MG
81 TABLET ORAL DAILY
Refills: 0 | Status: DISCONTINUED | OUTPATIENT
Start: 2022-03-17 | End: 2022-03-18

## 2022-03-17 RX ORDER — HYDROMORPHONE HYDROCHLORIDE 2 MG/ML
8 INJECTION INTRAMUSCULAR; INTRAVENOUS; SUBCUTANEOUS EVERY 4 HOURS
Refills: 0 | Status: DISCONTINUED | OUTPATIENT
Start: 2022-03-17 | End: 2022-03-18

## 2022-03-17 RX ORDER — OLANZAPINE 15 MG/1
10 TABLET, FILM COATED ORAL AT BEDTIME
Refills: 0 | Status: DISCONTINUED | OUTPATIENT
Start: 2022-03-17 | End: 2022-03-30

## 2022-03-17 RX ORDER — HYDROMORPHONE HYDROCHLORIDE 2 MG/ML
0 INJECTION INTRAMUSCULAR; INTRAVENOUS; SUBCUTANEOUS
Qty: 0 | Refills: 0 | DISCHARGE

## 2022-03-17 RX ORDER — HYDROCHLOROTHIAZIDE 25 MG
25 TABLET ORAL DAILY
Refills: 0 | Status: DISCONTINUED | OUTPATIENT
Start: 2022-03-18 | End: 2022-03-18

## 2022-03-17 RX ORDER — GABAPENTIN 400 MG/1
300 CAPSULE ORAL AT BEDTIME
Refills: 0 | Status: DISCONTINUED | OUTPATIENT
Start: 2022-03-17 | End: 2022-03-22

## 2022-03-17 RX ORDER — IOHEXOL 300 MG/ML
30 INJECTION, SOLUTION INTRAVENOUS ONCE
Refills: 0 | Status: COMPLETED | OUTPATIENT
Start: 2022-03-17 | End: 2022-03-17

## 2022-03-17 RX ORDER — MORPHINE SULFATE 50 MG/1
0 CAPSULE, EXTENDED RELEASE ORAL
Qty: 0 | Refills: 0 | DISCHARGE

## 2022-03-17 RX ORDER — METOPROLOL TARTRATE 50 MG
50 TABLET ORAL DAILY
Refills: 0 | Status: DISCONTINUED | OUTPATIENT
Start: 2022-03-18 | End: 2022-03-30

## 2022-03-17 RX ORDER — ACETAMINOPHEN 500 MG
1000 TABLET ORAL ONCE
Refills: 0 | Status: COMPLETED | OUTPATIENT
Start: 2022-03-17 | End: 2022-03-17

## 2022-03-17 RX ORDER — VANCOMYCIN HCL 1 G
1000 VIAL (EA) INTRAVENOUS ONCE
Refills: 0 | Status: COMPLETED | OUTPATIENT
Start: 2022-03-17 | End: 2022-03-17

## 2022-03-17 RX ORDER — FENTANYL CITRATE 50 UG/ML
50 INJECTION INTRAVENOUS ONCE
Refills: 0 | Status: DISCONTINUED | OUTPATIENT
Start: 2022-03-17 | End: 2022-03-17

## 2022-03-17 RX ORDER — ATORVASTATIN CALCIUM 80 MG/1
10 TABLET, FILM COATED ORAL AT BEDTIME
Refills: 0 | Status: DISCONTINUED | OUTPATIENT
Start: 2022-03-17 | End: 2022-03-30

## 2022-03-17 RX ORDER — LACTULOSE 10 G/15ML
10 SOLUTION ORAL
Refills: 0 | Status: DISCONTINUED | OUTPATIENT
Start: 2022-03-17 | End: 2022-03-20

## 2022-03-17 RX ORDER — METFORMIN HYDROCHLORIDE 850 MG/1
1 TABLET ORAL
Qty: 0 | Refills: 0 | DISCHARGE

## 2022-03-17 RX ORDER — LOSARTAN POTASSIUM 100 MG/1
1 TABLET, FILM COATED ORAL
Qty: 0 | Refills: 0 | DISCHARGE

## 2022-03-17 RX ORDER — METOPROLOL TARTRATE 50 MG
1 TABLET ORAL
Qty: 0 | Refills: 0 | DISCHARGE

## 2022-03-17 RX ORDER — GABAPENTIN 400 MG/1
0 CAPSULE ORAL
Qty: 0 | Refills: 0 | DISCHARGE

## 2022-03-17 RX ORDER — POLYETHYLENE GLYCOL 3350 17 G/17G
17 POWDER, FOR SOLUTION ORAL
Refills: 0 | Status: DISCONTINUED | OUTPATIENT
Start: 2022-03-17 | End: 2022-03-28

## 2022-03-17 RX ORDER — GABAPENTIN 400 MG/1
200 CAPSULE ORAL
Refills: 0 | Status: DISCONTINUED | OUTPATIENT
Start: 2022-03-17 | End: 2022-03-22

## 2022-03-17 RX ORDER — HYDROMORPHONE HYDROCHLORIDE 2 MG/ML
1 INJECTION INTRAMUSCULAR; INTRAVENOUS; SUBCUTANEOUS
Qty: 0 | Refills: 0 | DISCHARGE

## 2022-03-17 RX ORDER — ALPRAZOLAM 0.25 MG
1 TABLET ORAL DAILY
Refills: 0 | Status: DISCONTINUED | OUTPATIENT
Start: 2022-03-17 | End: 2022-03-24

## 2022-03-17 RX ADMIN — FENTANYL CITRATE 50 MICROGRAM(S): 50 INJECTION INTRAVENOUS at 16:15

## 2022-03-17 RX ADMIN — FENTANYL CITRATE 50 MICROGRAM(S): 50 INJECTION INTRAVENOUS at 18:41

## 2022-03-17 RX ADMIN — PIPERACILLIN AND TAZOBACTAM 200 GRAM(S): 4; .5 INJECTION, POWDER, LYOPHILIZED, FOR SOLUTION INTRAVENOUS at 13:43

## 2022-03-17 RX ADMIN — Medication 250 MILLIGRAM(S): at 15:01

## 2022-03-17 RX ADMIN — SODIUM CHLORIDE 1000 MILLILITER(S): 9 INJECTION, SOLUTION INTRAVENOUS at 13:11

## 2022-03-17 RX ADMIN — HYDROMORPHONE HYDROCHLORIDE 0.5 MILLIGRAM(S): 2 INJECTION INTRAMUSCULAR; INTRAVENOUS; SUBCUTANEOUS at 20:08

## 2022-03-17 RX ADMIN — LACTULOSE 10 GRAM(S): 10 SOLUTION ORAL at 23:51

## 2022-03-17 RX ADMIN — Medication 1000 MILLIGRAM(S): at 15:05

## 2022-03-17 RX ADMIN — FENTANYL CITRATE 50 MICROGRAM(S): 50 INJECTION INTRAVENOUS at 13:08

## 2022-03-17 RX ADMIN — Medication 1000 MILLIGRAM(S): at 14:53

## 2022-03-17 RX ADMIN — HYDROMORPHONE HYDROCHLORIDE 8 MILLIGRAM(S): 2 INJECTION INTRAMUSCULAR; INTRAVENOUS; SUBCUTANEOUS at 23:52

## 2022-03-17 RX ADMIN — Medication 400 MILLIGRAM(S): at 13:25

## 2022-03-17 RX ADMIN — FENTANYL CITRATE 50 MICROGRAM(S): 50 INJECTION INTRAVENOUS at 15:40

## 2022-03-17 RX ADMIN — IOHEXOL 30 MILLILITER(S): 300 INJECTION, SOLUTION INTRAVENOUS at 13:25

## 2022-03-17 RX ADMIN — PIPERACILLIN AND TAZOBACTAM 3.38 GRAM(S): 4; .5 INJECTION, POWDER, LYOPHILIZED, FOR SOLUTION INTRAVENOUS at 14:53

## 2022-03-17 RX ADMIN — FENTANYL CITRATE 50 MICROGRAM(S): 50 INJECTION INTRAVENOUS at 15:05

## 2022-03-17 NOTE — H&P ADULT - HISTORY OF PRESENT ILLNESS
57 y/o M w/ PMH rectal cancer s/p radiation, prostate cancer s/p radiation, HIV on biktarvy, diverticulosis w/ h/o diverticulitis, anxiety, depression, HTN, chronic constipation presents with uncontrollable rectal pain, bleeding, subjective fevers, chills, diaphoresis, and difficulty ambulating. Patient has been told he is cancer free w/ last radiation therapy in August 2021. Since then he has been having increased rectal pain uncontrolled by pain regimen managed by Dr. Colon (60 mg morphine ER q8, dilaudid 8mg PO q4 PRN). Pain has progressively worsened over the past number of months and is now associated with new onset difficulty ambulating. At baseline, patient walks "slowly" on his own without assistance from his house to the car. Needs assistance from wife with everyday activities such as dressing himself. Over the past few days patient has required use of cane to ambulate. Patient endorses one episode of significant bowel incontinence about 3 weeks ago and multiple small episodes since then. Denies any urinary incontinence. Patient consistently has blood in the toilet after bowel movement and blood on toilet paper. Over the past 3 days patient has had subjective fevers, however has taken multiple measurements and highest has been . Additionally patient has been diaphoretic and has the chills.  55 y/o M w/ PMH rectal cancer s/p radiation, prostate cancer s/p radiation, HIV on biktarvy, diverticulosis w/ h/o diverticulitis, anxiety, depression, HTN, chronic constipation presents with uncontrollable rectal pain, bleeding, subjective fevers, chills, diaphoresis, and difficulty ambulating. Patient has been told he is cancer free w/ last radiation therapy in August 2021. Since then he has been having increased rectal pain uncontrolled by pain regimen managed by Dr. Colon (60 mg morphine ER q8, dilaudid 8mg PO q4 PRN). Pain has progressively worsened over the past number of months and is now associated with new onset difficulty ambulating. At baseline, patient walks "slowly" on his own without assistance from his house to the car. Needs assistance from wife with everyday activities such as dressing himself. Over the past few days patient has required use of cane to ambulate. Patient endorses one episode of significant bowel incontinence about 3 weeks ago and multiple small episodes since then. Denies any urinary incontinence. Patient consistently has blood in the toilet after bowel movement and blood on toilet paper. Over the past 3 days patient has had subjective fevers, however has taken multiple measurements and highest has been . Additionally patient has been diaphoretic and has the chills. Patient also has chronic constipation for which he is prescribed lactulose, however this has not worked for him. Has not moved his bowels in 3 days. Denies chest pain, SOB, cough, N/V, headache, rash, numbness including saddle anesthesia, dysuria, hematuria.  57 y/o M with PMH anal cancer (dx 4/2021) s/p radiation and chemo (last 8/2021) complicated by an ulcer, prostate cancer (dx 2016) s/p radiation (no longer requiring tx), HIV on Biktarvy, HTN, diverticulosis w/ h/o diverticulitis, anxiety, depression, and chronic constipation presents with uncontrollable rectal pain with bleeding, subjective fevers, chills, diaphoresis, and difficulty ambulating. Patient was told he is cancer free after last radiation therapy in August 2021. Since then, though, he has been having increased rectal pain uncontrolled by pain regimen managed by Dr. Colon (PO morphine ER 60 mg q8h, PO dilaudid 4-8 mg q4h PRN). Pain has progressively worsened over the past number of months and is now associated with new-onset difficulty ambulating. At baseline, patient walks "slowly" on his own without assistance from his house to the car. Needs assistance from wife with everyday activities such as dressing himself. Over the past few days, patient has required use of cane to ambulate. Patient endorses one episode of significant bowel incontinence about 3 weeks ago and multiple small episodes since then. Denies any urinary incontinence. Patient consistently has blood in the toilet bowl, no clots, and blood on toilet paper after bowel movements. Over the past 3 days, patient has had subjective fevers, however has taken multiple measurements of temp and highest has been 99-100F. Additionally patient has been having chills and diaphoresis. Patient also has chronic constipation for which he is prescribed lactulose, however this has not worked for him. Has not moved his bowels in 3 days. Denies chest pain, SOB, cough, N/V, headache, rash, numbness including saddle anesthesia, dysuria, or hematuria.

## 2022-03-17 NOTE — H&P ADULT - NSHPPHYSICALEXAM_GEN_ALL_CORE
Vital Signs Last 24 Hrs  T(C): 37.3 (17 Mar 2022 22:22), Max: 37.8 (17 Mar 2022 13:00)  T(F): 99.2 (17 Mar 2022 22:22), Max: 100 (17 Mar 2022 13:00)  HR: 114 (17 Mar 2022 22:22) (99 - 114)  BP: 134/94 (17 Mar 2022 22:22) (102/73 - 134/94)  BP(mean): --  RR: 17 (17 Mar 2022 22:22) (17 - 22)  SpO2: 97% (17 Mar 2022 22:22) (94% - 99%)    PHYSICAL EXAM:  GENERAL: Moderately distressed 2/2 pain  HEAD:  Atraumatic, Normocephalic  EYES: EOMI, PERRLA, conjunctiva and sclera clear  ENMT: No tonsillar erythema, exudates, or enlargement; Moist mucous membranes, Good dentition  NECK: Supple, No JVD  NERVOUS SYSTEM: AOX3, motor and sensation grossly intact in b/l UE and b/l LE  PSYCHIATRIC: Appropriate affect and mood  CHEST/LUNG: Clear to auscultation bilaterally; No rales, rhonchi, wheezing, or rubs  HEART: Regular rate and rhythm; No murmurs, rubs, or gallops. No LE edema  ABDOMEN: Soft, epigastric tenderness, bowel sounds present  RECTAL: uniformly surrounded bu erythematous indurated skin, yellow drainage around anal verge, ELISA deferred 2/2 pain  EXTREMITIES:  2+ Peripheral Pulses, No clubbing, cyanosis  SKIN: No rashes or lesions Vital Signs Last 24 Hrs  T(C): 37.3 (17 Mar 2022 22:22), Max: 37.8 (17 Mar 2022 13:00)  T(F): 99.2 (17 Mar 2022 22:22), Max: 100 (17 Mar 2022 13:00)  HR: 114 (17 Mar 2022 22:22) (99 - 114)  BP: 134/94 (17 Mar 2022 22:22) (102/73 - 134/94)  BP(mean): --  RR: 17 (17 Mar 2022 22:22) (17 - 22)  SpO2: 97% (17 Mar 2022 22:22) (94% - 99%)    PHYSICAL EXAM:  General: Awake and alert.  In moderate distress 2/2 rectal pain.  Head: Normocephalic, atraumatic.    Eyes: PERRL.  EOMI.  No scleral icterus.  No conjunctival pallor.  Mouth: Moist MM.  No oral thrush.  No oropharyngeal exudates.    Neck: Supple.  Full range of motion.  No JVD.  No LAD.  No thyromegaly.  Trachea midline.    Heart: Borderline tachycardic, regular rhythm.  Normal S1 and S2.  No murmurs, rubs, or gallops.  No LE edema b/l.   Lungs: Nonlabored breathing.  Good inspiratory effort.  CTAB.  No wheezes, crackles, or rhonchi.    Abdomen: BS+, soft, mild epigastric tenderness with no rebound or guarding, nondistended.  No hepatomegaly.   Rectal: Uniformly surrounded by erythematous indurated skin, no fluctuance, yellow drainage around anal verge, ELISA deferred 2/2 pain.  No gross blood.   Skin: Warm and dry.  No rashes.  Extremities: No cyanosis.  2+ peripheral pulses b/l.  Musculoskeletal: No joint deformities.  No spinal or paraspinal tenderness.  Neuro: A&Ox3.  CN II-XII intact.  5/5 motor strength in UE b/l and RLE, 4/5 motor strength in LLE.  Tactile sensation intact in UE and LE b/l.  No focal deficits.  Psychiatric: Full affect, normal mood, speech clear, good insight.  Denies SI or HI.

## 2022-03-17 NOTE — H&P ADULT - NSHPSOCIALHISTORY_GEN_ALL_CORE
Lives at home with wife. Needs assistance with some ADLs. Denies alcohol, tobacco, or recreational drug use.

## 2022-03-17 NOTE — CONSULT NOTE ADULT - ASSESSMENT
56 year old male with history of anal squamous cell carcinoma s/p resection (presumed hemorrhoid at the time Dr. Avilez 4/2021), dede protocol, c/b chronic ulcer and pain s/p biopsy (11/2021 Dr. Avilez) who presents with worsening pain and drainage. Pain uncontrolled with current management. CT with finding of possible fistula     PLAN:   - No surgical intervention  - Recommend symptomatic pain control  - Pelvic MRI to declinate fistula tract  - Plan discussed with Attending, Dr. Fatmata Ryan MD, PGY 3  A Team Surgery  [id number]

## 2022-03-17 NOTE — H&P ADULT - PROBLEM SELECTOR PLAN 9
continue atorvastatin 10 BPs soft upon arrival to ED. Will continue metoprolol 50 given tachycardia and hold HCTZ and losartan. -denies SI or HI    Plan:  -continue olanzapine 10 mg daily

## 2022-03-17 NOTE — ED PROVIDER NOTE - OBJECTIVE STATEMENT
56M hx of anal Ca has been on radiation and chemo last session was aug 2021, presents to the ED for rectal bleeding, pain, and yellowish discharge x months, acutely worse over past few weeks, now with decreased functional status (walks slowly with assistance) 56M hx of anal Ca has been on radiation and chemo last session was aug 2021, presents to the ED for rectal bleeding, pain, and yellowish discharge x months, acutely worse over past few weeks, now with decreased functional status (walks slowly with assistance). Denies bowel/bladder incontinence, saddle anesthesia, numbness/tingling of legs. Denies fevers at home. Took 81mg asa at 730am. Takes 60mg morphine ER q8, dialudid 4mg PO, gabapentin. Denies nausea, vomiting.

## 2022-03-17 NOTE — ED ADULT NURSE REASSESSMENT NOTE - NS ED NURSE REASSESS COMMENT FT1
Pt A&O x 4. Wife verbalized to writer that patient took his home Zyprexa, ambien and Potassium at 9:40. MD decker made aware. Wife encouraged to notify provider prior to taking meds. Admission dispo pending. Pain management in progress. Stretcher in lowest position, wheels locked, appropriate side rails in place, call bell in reach.

## 2022-03-17 NOTE — H&P ADULT - PROBLEM SELECTOR PLAN 10
DVT Prophylaxis: hold 2/2 anal bleed  Diet: regular  Code Status: full code  Dispo: pending clinical course continue atorvastatin 10 -continue Xanax 1 mg daily PRN

## 2022-03-17 NOTE — ED PROVIDER NOTE - PHYSICAL EXAMINATION
GENERAL: middle aged male, lying in bed, appears uncomfortable. Tachycardic otherwise Vital signs are within normal limits  EYES: Conjunctiva noninjected or pale, sclera anicteric  HENT: NC/AT, moist mucous membranes  NECK: Supple, trachea midline  LUNG: Nonlabored respirations, no wheezes, rales  CV: tachy rate, Pulses- Radial: 2+ bilateral and equal  ABDOMEN: Nondistended, nontender, no guarding., patient able to feel rectal thermometer and hold thermometer in place  RECTAL: Exam chaperoned by Dr. Marcano.  -Inspection: No external fissures. No external hemorrhoids. External radiation skin changes, loose anal sphincter but tone present.  MSK: No visible deformities, nontender extremities, able to move UE/LE w/o restriction  SKIN: No rashes, bruises  NEURO: AAOx4 (to person, place, time, event), no tremor, sensation intact to touch LE b/l  PSYCH: Normal mood and affect

## 2022-03-17 NOTE — CONSULT NOTE ADULT - SUBJECTIVE AND OBJECTIVE BOX
GENERAL SURGERY CONSULT NOTE  --------------------------------------------------------------------------------------------  HPI:  56 year old male with history of anal squamous cell carcinoma s/p resection (presumed hemorrhoid at the time Dr. Avilez 2021), dede protocol, c/b chronic ulcer and pain s/p biopsy (2021 Dr. Avilez) who presents with worsening pain and drainage. Patient reports pain has been ongoing since the treatments, progressively worsen to the point where he has pain with ambulation, moving bowels, associated with subjective fevers.       PAST MEDICAL & SURGICAL HISTORY:  DM (diabetes mellitus)  HTN (hypertension)  HLD (hyperlipidemia)  HIV infection  Depressive disorder  Anxiety  Anal lesion    FAMILY HISTORY:    SOCIAL HISTORY:   Denies smoking     ALLERGIES: No Known Allergies    HOME MEDICATIONS:    CURRENT MEDICATIONS  MEDICATIONS (STANDING):   MEDICATIONS (PRN):  --------------------------------------------------------------------------------------------    Vitals:   T(C): 37.8 (22 @ 13:00), Max: 37.8 (22 @ 13:00)  HR: 105 (22 @ 15:38) (99 - 113)  BP: 124/94 (22 @ 15:38) (102/73 - 124/94)  RR: 18 (22 @ 15:38) (18 - 22)  SpO2: 99% (22 @ 15:38) (94% - 99%)  CAPILLARY BLOOD GLUCOSE      POCT Blood Glucose.: 108 mg/dL (17 Mar 2022 12:35)    Height (cm): 180.3 ( @ 12:23)    PHYSICAL EXAM:   General: NAD, Lying in bed comfortably  Neuro: Alert and answering questions appropriately   HEENT: Grossly normal, EOMI  Cardio: tachycardia to 100-110s  Resp: Good effort, no signs of respiratory distress  Rectal: Too tender for ELISA, erythematous around anal verge, chronic skin changes, induration, yellow drainage from the verge, cannot visualize and fistulas. Ulcer at the verge.   Vascular: All 4 extremities warm  Musculoskeletal: All 4 extremities moving spontaneously, no limitations  --------------------------------------------------------------------------------------------    LABS  CBC ( 13:30)                              11.4<L>                         8.56    )----------------(  413<H>     69.3  % Neutrophils, 17.1  % Lymphocytes, ANC: 5.93                                36.2<L>    BMP ( @ 13:30)             140     |  101     |  13    		Ca++ --      Ca 9.7                ---------------------------------( 104<H>		Mg --                 4.1     |  26      |  0.82  			Ph --        LFTs ( 13:30)      TPro 7.3 / Alb 3.6 / TBili 0.3 / DBili -- / AST 16 / ALT 10 / AlkPhos 83    Coags ( @ 13:30)  aPTT 31.4 / INR 1.26<H> / PT 14.7<H>        VBG ( 13:30)     7.39 / 51 / 43 / 31<H> / 4.9<H> / 72.9%     Lactate: 1.5    --------------------------------------------------------------------------------------------    MICROBIOLOGY  Urinalysis ( @ 14:48):     Color: Yellow / Appearance: Clear / S.021 / pH: 6.5 / Gluc: Negative / Ketones: Negative / Bili: Negative / Urobili: <2 mg/dL / Protein :Trace<!> / Nitrites: Negative / Leuk.Est: Negative / RBC: 1 / WBC: 1 / Sq Epi:  / Non Sq Epi: 0 / Bacteria Negative   MANY MUCOUS THREADS      --------------------------------------------------------------------------------------------  IMAGING  < from: CT Abdomen and Pelvis w/ Oral Cont and w/ IV Cont (22 @ 15:54) >  IMPRESSION:  Limited evaluation for residual sinus tract. A small droplet of gas is   noted in the left intersphincteric space with associated edema, similar   in appearance to prior CT dated 2022. There is an appearance   concerning for residual tract or collection as described above. MRI is   recommended to better delineate possible fistulae.        --- End of Report ---    < end of copied text >      --------------------------------------------------------------------------------------------

## 2022-03-17 NOTE — H&P ADULT - NSHPREVIEWOFSYSTEMS_GEN_ALL_CORE
CONSTITUTIONAL: No objective fever, weight loss, or fatigue  EYES: No eye pain, visual disturbances, or discharge  ENMT:  No difficulty hearing, tinnitus, vertigo; No sinus or throat pain  RESPIRATORY: No cough, wheezing, chills or hemoptysis; No shortness of breath  CARDIOVASCULAR: No chest pain, palpitations, dizziness, or leg swelling  GASTROINTESTINAL: + abdominal pain. + hematochezia. + constipation. No nausea, vomiting, or hematemesis; No diarrhea. No melena   GENITOURINARY: No dysuria, frequency, hematuria, or incontinence  NEUROLOGICAL: No headaches, loss of strength, numbness, or tremors  SKIN: No itching, burning, rashes, or lesions   LYMPH NODES: No enlarged glands  ENDOCRINE: No heat or cold intolerance; No polydipsia or polyuria  MUSCULOSKELETAL: No joint pain or swelling;   HEME/LYMPH: No easy bruising, or bleeding gums  ALLERGY AND IMMUNOLOGIC: No hives or eczema Constitutional: +Subjective fevers, diaphoresis, and chills. No generalized weakness.  Eyes: No visual changes, double vision, or eye pain  Ears, Nose, Mouth, Throat: No runny nose, sinus pain, ear pain, tinnitus, sore throat, dysphagia, or odynophagia  Cardiovascular: No chest pain, palpitations, or LE edema  Respiratory: No cough, wheezing, hemoptysis, or shortness of breath  Gastrointestinal: +Rectal pain, hematochezia, bowel incontinence, chronic constipation. No abdominal pain, nausea/vomiting, diarrhea/constipation, hematemesis, melena, or BRBPR.  Genitourinary: No dysuria, frequency, urgency, or hematuria  Musculoskeletal: No back pain or joint pain, swelling, or decreased ROM  Skin: No pruritus or rashes  Neurologic: No syncope, seizures, headache, paresthesias, numbness, or limb weakness  Psychiatric: +Anxiety and depression. No SI, HI, or agitation.   Endocrine: No heat/cold intolerance, mood swings, sweats, polydipsia, or polyuria  Hematologic/lymphatic: No purpura, petechia, or prolonged or excessive bleeding after dental extraction / injury  Allergic/Immunologic: No anaphylaxis or allergic response to materials, foods, animals    Positives and pertinent negatives noted and all other systems negative.

## 2022-03-17 NOTE — H&P ADULT - PROBLEM SELECTOR PLAN 12
DVT Prophylaxis: hold pharmacologic ppx in setting of recent hematochezia  Diet: regular  Code Status: full code  Dispo: pending clinical course

## 2022-03-17 NOTE — ED ADULT NURSE NOTE - OBJECTIVE STATEMENT
Received pt to [facility] from home with c/o worsening rectal pain, bleeding and yellow discharge x several months. Pt has hx of prostate CA, and rectal CA last treatment August 2021. Pt reports pain radiating to legs causing difficulty ambulating secondary to pain. Pt is A&OX4, skin warm dry, + strong regular radial pulses bi laterally. #20g IV placed to L forearm, lab work collected as ordered. Pt reports hx of HTN, DM, HIV and has been compliant with medications. MD's at bedside for eval, will continue to monitor.

## 2022-03-17 NOTE — H&P ADULT - PROBLEM SELECTOR PLAN 6
continue home xanax 1mg PRN -continue olanzapine 10mg daily -patient compliant with Biktarvy    Plan:  -c/w Biktarvy, CD4 count 947 in 5/2021  -check CD4 count and HIV viral load in AM

## 2022-03-17 NOTE — H&P ADULT - PROBLEM SELECTOR PLAN 2
-patient meets SIRS criteria w/ tachycardia and tachypnea  -subjective fevers and chills at home, however afebrile in ED and per patient all measured temps at home <100  -no infectious symptoms  -blood and urine cultures drawn  -received 1 dose vanc/zosyn in ED    Plan: given HIV + would continue empiric antibiotics pending culture results -patient meets SIRS criteria w/ tachycardia and tachypnea  -subjective fevers and chills at home, however afebrile in ED and per patient all measured temps at home <100  -no infectious symptoms  -blood and urine cultures drawn  -received 1 dose vanc/zosyn in ED    Plan: given HIV and possible fistula would continue empiric antibiotics pending culture results with zosyn -pt reporting blood in toilet bowl and on toilet paper with BMs, though no clots, and associated with increased rectal pain  -no gross blood on rectal exam  -Hgb 11.4 on admission, was 12.9 on 1/25/22 and 11/24/21  -GI consult (emailed) for possible endoscopic evaluation  -monitor H/H  -keep active type and screen  -hold pt's home ASA and pharmacologic DVT ppx  -f/u MRI pelvis

## 2022-03-17 NOTE — H&P ADULT - ASSESSMENT
57 y/o M w/ PMH rectal cancer s/p radiation, prostate cancer s/p radiation, HIV on biktarvy, diverticulosis w/ h/o diverticulitis, anxiety, depression, HTN, chronic constipation presents with uncontrollable rectal pain, bleeding, subjective fevers, chills, diaphoresis, and difficulty ambulating. 57 y/o M with PMH anal cancer (dx 4/2021) s/p radiation and chemo (last 8/2021) complicated by an ulcer, prostate cancer (dx 2016) s/p radiation (no longer requiring tx), HIV on Biktarvy, HTN, diverticulosis w/ h/o diverticulitis, anxiety, depression, and chronic constipation presents with uncontrollable rectal pain with bleeding, subjective fevers, chills, diaphoresis, and difficulty ambulating.

## 2022-03-17 NOTE — H&P ADULT - NSHPLABSRESULTS_GEN_ALL_CORE
LABS:                        11.4   8.56  )-----------( 413      ( 17 Mar 2022 13:30 )             36.2     17 Mar 2022 13:30    140    |  101    |  13     ----------------------------<  104    4.1     |  26     |  0.82     Ca    9.7        17 Mar 2022 13:30    TPro  7.3    /  Alb  3.6    /  TBili  0.3    /  DBili  x      /  AST  16     /  ALT  10     /  AlkPhos  83     17 Mar 2022 13:30    PT/INR - ( 17 Mar 2022 13:30 )   PT: 14.7 sec;   INR: 1.26 ratio         PTT - ( 17 Mar 2022 13:30 )  PTT:31.4 sec  CAPILLARY BLOOD GLUCOSE      POCT Blood Glucose.: 115 mg/dL (17 Mar 2022 23:30)  POCT Blood Glucose.: 108 mg/dL (17 Mar 2022 12:35)    BLOOD CULTURE    RADIOLOGY & ADDITIONAL TESTS:    Imaging Personally Reviewed:  [ ] YES   CT A/P w/ ORAL AND IV CONTRAST    IMPRESSION:  Limited evaluation for residual sinus tract. A small droplet of gas is noted in the left intersphincteric space with associated edema, similar in appearance to prior CT dated 1/18/2022. There is an appearance concerning for residual tract or collection as described above. MRI is recommended to better delineate possible fistulae. EKG personally reviewed.  Sinus tach with frequent PVCs at 110 bpm, right axis deviation, QTc 465 ms.     Labs personally reviewed.                        11.4   8.56  )-----------( 413      ( 17 Mar 2022 13:30 )             36.2     17 Mar 2022 13:30    140    |  101    |  13     ----------------------------<  104    4.1     |  26     |  0.82     Ca    9.7        17 Mar 2022 13:30    TPro  7.3    /  Alb  3.6    /  TBili  0.3    /  DBili  x      /  AST  16     /  ALT  10     /  AlkPhos  83     17 Mar 2022 13:30    PT/INR - ( 17 Mar 2022 13:30 )   PT: 14.7 sec;   INR: 1.26 ratio     PTT - ( 17 Mar 2022 13:30 )  PTT:31.4 sec    CAPILLARY BLOOD GLUCOSE  POCT Blood Glucose.: 115 mg/dL (17 Mar 2022 23:30)  POCT Blood Glucose.: 108 mg/dL (17 Mar 2022 12:35)    Imaging personally reviewed.  CT A/P w/ ORAL AND IV CONTRAST    IMPRESSION:  Limited evaluation for residual sinus tract. A small droplet of gas is noted in the left intersphincteric space with associated edema, similar in appearance to prior CT dated 1/18/2022. There is an appearance concerning for residual tract or collection as described above. MRI is recommended to better delineate possible fistulae.

## 2022-03-17 NOTE — ED PROVIDER NOTE - ATTENDING CONTRIBUTION TO CARE
Dr. Marcano: 57 yo male with PMH prostate cancer (no longer being treated), separate anal cancer s/p resection and chemo/radiation (last given 7 months ago at [facility]), in ED with "months" of slow rectal bleeding and yellow discharge, worse recently, associated with severe pain that makes walking difficult.  At baseline pt has intermittent rectal incontinence due to post-surgical changes, and this is NOT worse today than at baseline.  He also has opioid-induced constipation and this is NOT worse today than at baseline.  He has no urinary incontinence or retention, no groin/saddle anesthesia, and no focal weakness.  Weakness in legs is attributed to pain, as pt has recently been able to ambulate short distances to get into doctor's office visits.  Wife feels that pt has had fever at home, but no measured fever at home.  On exam pt unwell appearing, in moderate distress due to pain, heart tachycardic, lungs CTAB, abd NTND, extremities without swelling, strength grossly intact in all extremities when laying in bed and skin without rash.  Rectal exam showing post-radiation changes to buttocks and post-surgical anal sphincter.  There is small amount of serous drainage from rectum, but no blood noted.  Rectal temp 100.0 in ED today.

## 2022-03-17 NOTE — H&P ADULT - PROBLEM SELECTOR PLAN 7
continue home zolpidem 10mg PRN continue home xanax 1mg PRN -BPs soft upon arrival to ED    Plan:  -c/w pt's home metoprolol with hold parameters but hold pt's home HCTZ and losartan pending clinical improvement

## 2022-03-17 NOTE — ED PROVIDER NOTE - CLINICAL SUMMARY MEDICAL DECISION MAKING FREE TEXT BOX
56M hx of anal cancer s/p chemo (08/2021) on RT here for rectal bleeding and discharge w/o symptomatic anemia. No fevers at home. Hemodynamically stable though tachycardic. No lower extremity motor or sensory changes, bowel or bladder dysfunction, saddle anesthesia. Visible scant yellowish discharge from rectum. Eval for pelvic deep space infection vs intrabad abscess. Patient meets sepsis criteria by vitals (rectal 100F). Unlikely central neurologic such as meninigitis or encephalitis. Eval for metabolic derrangement. Check labs, lactate, UA, CXs, CXR, CT a/p with IV and oral contrast, screening EKG, hydrate, empiric abxs -> antipyretics, additional crystalloid infusion as clinically warranted.

## 2022-03-17 NOTE — H&P ADULT - PROBLEM SELECTOR PLAN 1
11/1/2023      Leah Anderson MD  Physical Medicine and Rehabilitation  [address] [address]  Dept: [phone]  Dept Fax: [phone]        RE: Consultation for [facility]        Dear No primary care provider on file.,    Thank you very much for the opportunity to see your patient. Attached please find a summary from your patient's recent visit. I appreciate the chance to take care of your patient with you. Please feel free to call me with any questions or concerns. Sincerely,        Kristan Camp.  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 MRI for better evaluation of fistula  -GI consult (emailed) for possible endoscopic management of radiation proctitis  -Palliative consult for pain management -patient with acute on chronic rectal pain associated w/ hematochezia, yellow drainage, and constipation  -h/o anal cancer (invasive basaloid squamous carcinoma) s/p radiation and chemo (last 8/2021) complicated by occurrence of ulcer  -chronically on MS Contin 60 mg q8hrs and PO dilaudid 4-8 mg q4hrs PRN, which has not been adequately controlling the pain  -pain likely in setting of multiple radiation treatments to the area due to h/o anal cancer and prostate cancer, possibly radiation-induced proctitis  -CTAP on admission showed a tiny focus of gas in the left intersphincteric space with associated edema, similar   in appearance to prior CT dated 1/18/2022, and an appearance concerning for residual tract or collection, measuring approximately 3.2 x 1.7 cm.    Plan:   -pain control with MS Contin 60 mg q8hrs and IV dilaudid 0.5 mg q4hrs PRN for moderate pain and 1 mg q4hrs PRN for severe pain  -aggressive bowel regimen with Senna, Miralax, and lactulose, possibly add Relistor   -General surgery consulted, appreciate recs. MRI pelvis w/wo contrast for better evaluation of possible fistula.  -GI consult (emailed) for possible endoscopic management of radiation proctitis and evaluation for hematochezia  -Palliative consult for pain management  -s/p Vanc and Zosyn x1 dose each in ED, will c/w Vanc and Zosyn for now given subjective fevers/chills and possible fistula vs. abscess

## 2022-03-17 NOTE — PATIENT PROFILE ADULT - FALL HARM RISK - HARM RISK INTERVENTIONS
Assistance with ambulation/Assistance OOB with selected safe patient handling equipment/Communicate Risk of Fall with Harm to all staff/Discuss with provider need for PT consult/Monitor gait and stability/Provide patient with walking aids - walker, cane, crutches/Reinforce activity limits and safety measures with patient and family/Review medications for side effects contributing to fall risk/Sit up slowly, dangle for a short time, stand at bedside before walking/Tailored Fall Risk Interventions/Toileting schedule using arm’s reach rule for commode and bathroom/Visual Cue: Yellow wristband and red socks/Bed in lowest position, wheels locked, appropriate side rails in place/Call bell, personal items and telephone in reach/Instruct patient to call for assistance before getting out of bed or chair/Non-slip footwear when patient is out of bed/[place] to call system/Physically safe environment - no spills, clutter or unnecessary equipment/Purposeful Proactive Rounding/Room/bathroom lighting operational, light cord in reach

## 2022-03-17 NOTE — H&P ADULT - NSICDXPASTMEDICALHX_GEN_ALL_CORE_FT
PAST MEDICAL HISTORY:  Anal cancer     Anxiety     Depressive disorder     Diverticulosis     HIV infection     HLD (hyperlipidemia)     HTN (hypertension)     Prostate cancer

## 2022-03-17 NOTE — H&P ADULT - PROBLEM SELECTOR PLAN 3
-acute on chronic gait instability over the past 3 days  -neuro exam benign, 5/5 in UE b/l, 5/5 strength in RLE, and 4/5 strength in LLE which he states is his baseline  -usually ambulates without assistance, now is using cane  -more likely 2/2 anal pain radiating down legs, less likely due to other causes such as new mets    Plan: PT eval, monitor pending pain control, consider imaging or spine if symptoms don't improve -acute on chronic gait instability over the past 3 days  -neuro exam benign, 5/5 in UE b/l, 5/5 strength in RLE, and 4/5 strength in LLE which he states is his baseline  -usually ambulates without assistance, now is using cane  -more likely 2/2 anal pain radiating down legs, less likely due to other causes such as new mets    Plan: PT alaina, monitor pending pain control, MR lumbosacral spine -patient meets SIRS criteria w/ tachycardia and tachypnea  -subjective fevers and chills at home, however afebrile in ED and per patient all measured temps at home <100F  -blood and urine cultures drawn  -s/p Vanc and Zosyn x1 dose each in ED    Plan:   -will c/w Vanc and Zosyn for now given subjective fevers/chills and possible fistula vs. abscess  -f/u Vanc trough  -f/u blood and urine cultures  -f/u MRI pelvis and L spine w/wo contrast

## 2022-03-17 NOTE — H&P ADULT - PROBLEM SELECTOR PLAN 11
DVT Prophylaxis: hold 2/2 anal bleed  Diet: regular  Code Status: full code  Dispo: pending clinical course -continue home Zolpidem 10 mg qHS PRN

## 2022-03-17 NOTE — ED PROVIDER NOTE - NS ED ROS FT
CONSTITUTIONAL: No fevers, chills, fatigue  ENT: No nasal congestion, runny nose, sore throat  CV: No chest pain  PULM: No cough, shortness of breath  GI: + abdominal pain, rectal discharge, bloody stools. No nausea, vomiting, constipation  : No dysuria, polyuria, hematuria  SKIN: No rashes, swelling  MSK: No back pain, flank pain  NEURO: No headache  PSYCH: No SI, hallucinations

## 2022-03-17 NOTE — H&P ADULT - PROBLEM SELECTOR PLAN 8
-continue home metoprolol 50, hctz 25, and losartan 50 BPs soft upon arrival to ED. Will continue metoprolol 50 given tachycardia and hold HCTZ and losartan. continue home zolpidem 10mg PRN -continue atorvastatin 10 mg qHS

## 2022-03-17 NOTE — H&P ADULT - PROBLEM SELECTOR PLAN 5
-continue olanzapine 10mg daily -patient compliant with biktarvy, will continue inpatient. CD4 count 947 in 5/21  -CD4 count in AM -likely anemia of chronic disease from malignancy vs KAREN, however patient also with recent hematochezia, no active bleed  -Hgb 11.4 on admission, was 12.9 on 1/25/22 and 11/24/21    Plan:   -plan as above for hematochezia  -check iron studies, folate, vitamin B12

## 2022-03-17 NOTE — ED PROVIDER NOTE - PROGRESS NOTE DETAILS
Received pt on sign out. pt pending surgery evaluation. pt asking  for pain med Elias PGY3: surgery no intervention, given fever w/ tachycardia, concern for possible pelvic infection at level of purulence. Discussed w/ hospitalist, started pt on broad abx, plan for admission.

## 2022-03-17 NOTE — H&P ADULT - PROBLEM SELECTOR PLAN 4
-patient compliant with biktarvy, will continue inpatient  -CD4 count in AM -patient compliant with biktarvy, will continue inpatient. CD4 count 947 in 5/21  -CD4 count in AM -likely ACD from malignancy vs KAREN, however patient does have blood in toilet after bowel movements and blood on toilet paper  -Hgb 11.4 from 12.9 in 1/22, 13.8 in 10/21  -possible slow bleed from radiation proctitis    Plan: iron studies in AM, GI consult -acute on chronic gait instability over the past 3 days  -neuro exam benign, 5/5 in UE b/l, 5/5 strength in RLE, and 4/5 strength in LLE, which he states is his baseline  -usually ambulates without assistance, now is using cane  -more likely 2/2 rectal pain radiating down legs, less likely due to other causes such as new mets    Plan:   -f/u MRI pelvis and L spine w/wo contrast  -PT eval  -fall risk protocol

## 2022-03-17 NOTE — H&P ADULT - ATTENDING COMMENTS
57 y/o M with PMH anal cancer (dx 4/2021) s/p radiation and chemo (last 8/2021) complicated by an ulcer, prostate cancer (dx 2016) s/p radiation (no longer requiring tx), HIV on Biktarvy, HTN, diverticulosis w/ h/o diverticulitis, anxiety, depression, and chronic constipation presents with uncontrollable rectal pain with hematochezia. Surgery following. CTAP showing possible fistula vs. abscess. MRI pelvis pending. On Vanc and Zosyn. GI consult pending for possible endoscopic evaluation for hematochezia, likely from radiation proctitis. Palliative consult placed for pain management. MRI L spine also pending to r/o cord compression/cauda equina causing new gait instability with b/l LE pain.

## 2022-03-18 ENCOUNTER — APPOINTMENT (OUTPATIENT)
Dept: HYPERBARIC MEDICINE | Facility: CLINIC | Age: 57
End: 2022-03-18

## 2022-03-18 DIAGNOSIS — F41.9 ANXIETY DISORDER, UNSPECIFIED: ICD-10-CM

## 2022-03-18 DIAGNOSIS — K59.00 CONSTIPATION, UNSPECIFIED: ICD-10-CM

## 2022-03-18 DIAGNOSIS — F32.9 MAJOR DEPRESSIVE DISORDER, SINGLE EPISODE, UNSPECIFIED: ICD-10-CM

## 2022-03-18 DIAGNOSIS — B20 HUMAN IMMUNODEFICIENCY VIRUS [HIV] DISEASE: ICD-10-CM

## 2022-03-18 DIAGNOSIS — D64.9 ANEMIA, UNSPECIFIED: ICD-10-CM

## 2022-03-18 DIAGNOSIS — K60.3 ANAL FISTULA: ICD-10-CM

## 2022-03-18 DIAGNOSIS — E78.5 HYPERLIPIDEMIA, UNSPECIFIED: ICD-10-CM

## 2022-03-18 DIAGNOSIS — R26.81 UNSTEADINESS ON FEET: ICD-10-CM

## 2022-03-18 DIAGNOSIS — Z85.048 PERSONAL HISTORY OF OTHER MALIGNANT NEOPLASM OF RECTUM, RECTOSIGMOID JUNCTION, AND ANUS: ICD-10-CM

## 2022-03-18 DIAGNOSIS — G47.00 INSOMNIA, UNSPECIFIED: ICD-10-CM

## 2022-03-18 DIAGNOSIS — R65.10 SYSTEMIC INFLAMMATORY RESPONSE SYNDROME (SIRS) OF NON-INFECTIOUS ORIGIN WITHOUT ACUTE ORGAN DYSFUNCTION: ICD-10-CM

## 2022-03-18 DIAGNOSIS — Z29.9 ENCOUNTER FOR PROPHYLACTIC MEASURES, UNSPECIFIED: ICD-10-CM

## 2022-03-18 DIAGNOSIS — R53.81 OTHER MALAISE: ICD-10-CM

## 2022-03-18 DIAGNOSIS — K92.1 MELENA: ICD-10-CM

## 2022-03-18 DIAGNOSIS — I10 ESSENTIAL (PRIMARY) HYPERTENSION: ICD-10-CM

## 2022-03-18 DIAGNOSIS — K62.89 OTHER SPECIFIED DISEASES OF ANUS AND RECTUM: ICD-10-CM

## 2022-03-18 DIAGNOSIS — Z51.5 ENCOUNTER FOR PALLIATIVE CARE: ICD-10-CM

## 2022-03-18 LAB
4/8 RATIO: 0.28 RATIO — LOW (ref 0.9–3.6)
ABS CD8: 855 /UL — HIGH (ref 142–740)
ALBUMIN SERPL ELPH-MCNC: 3.4 G/DL — SIGNIFICANT CHANGE UP (ref 3.3–5)
ALP SERPL-CCNC: 69 U/L — SIGNIFICANT CHANGE UP (ref 40–120)
ALT FLD-CCNC: 9 U/L — SIGNIFICANT CHANGE UP (ref 4–41)
ANION GAP SERPL CALC-SCNC: 10 MMOL/L — SIGNIFICANT CHANGE UP (ref 7–14)
AST SERPL-CCNC: 14 U/L — SIGNIFICANT CHANGE UP (ref 4–40)
BASOPHILS # BLD AUTO: 0.03 K/UL — SIGNIFICANT CHANGE UP (ref 0–0.2)
BASOPHILS NFR BLD AUTO: 0.4 % — SIGNIFICANT CHANGE UP (ref 0–2)
BILIRUB SERPL-MCNC: 0.4 MG/DL — SIGNIFICANT CHANGE UP (ref 0.2–1.2)
BUN SERPL-MCNC: 10 MG/DL — SIGNIFICANT CHANGE UP (ref 7–23)
CALCIUM SERPL-MCNC: 9.1 MG/DL — SIGNIFICANT CHANGE UP (ref 8.4–10.5)
CD16+CD56+ CELLS NFR BLD: 4 % — LOW (ref 5–23)
CD16+CD56+ CELLS NFR SPEC: 51 /UL — LOW (ref 71–410)
CD19 BLASTS SPEC-ACNC: 6 % — SIGNIFICANT CHANGE UP (ref 6–24)
CD19 BLASTS SPEC-ACNC: 74 /UL — LOW (ref 84–469)
CD3 BLASTS SPEC-ACNC: 1071 /UL — SIGNIFICANT CHANGE UP (ref 672–1870)
CD3 BLASTS SPEC-ACNC: 90 % — HIGH (ref 59–83)
CD4 %: 20 % — LOW (ref 30–62)
CD8 %: 71 % — HIGH (ref 12–36)
CHLORIDE SERPL-SCNC: 102 MMOL/L — SIGNIFICANT CHANGE UP (ref 98–107)
CO2 SERPL-SCNC: 25 MMOL/L — SIGNIFICANT CHANGE UP (ref 22–31)
CREAT SERPL-MCNC: 0.68 MG/DL — SIGNIFICANT CHANGE UP (ref 0.5–1.3)
CULTURE RESULTS: NO GROWTH — SIGNIFICANT CHANGE UP
EGFR: 110 ML/MIN/1.73M2 — SIGNIFICANT CHANGE UP
EOSINOPHIL # BLD AUTO: 0 K/UL — SIGNIFICANT CHANGE UP (ref 0–0.5)
EOSINOPHIL NFR BLD AUTO: 0 % — SIGNIFICANT CHANGE UP (ref 0–6)
GLUCOSE SERPL-MCNC: 150 MG/DL — HIGH (ref 70–99)
HCT VFR BLD CALC: 32.9 % — LOW (ref 39–50)
HGB BLD-MCNC: 10.6 G/DL — LOW (ref 13–17)
IANC: 5.99 K/UL — SIGNIFICANT CHANGE UP (ref 1.5–8.5)
IMM GRANULOCYTES NFR BLD AUTO: 0.5 % — SIGNIFICANT CHANGE UP (ref 0–1.5)
LYMPHOCYTES # BLD AUTO: 1.14 K/UL — SIGNIFICANT CHANGE UP (ref 1–3.3)
LYMPHOCYTES # BLD AUTO: 14.1 % — SIGNIFICANT CHANGE UP (ref 13–44)
MAGNESIUM SERPL-MCNC: 2.1 MG/DL — SIGNIFICANT CHANGE UP (ref 1.6–2.6)
MCHC RBC-ENTMCNC: 27.7 PG — SIGNIFICANT CHANGE UP (ref 27–34)
MCHC RBC-ENTMCNC: 32.2 GM/DL — SIGNIFICANT CHANGE UP (ref 32–36)
MCV RBC AUTO: 85.9 FL — SIGNIFICANT CHANGE UP (ref 80–100)
MONOCYTES # BLD AUTO: 0.9 K/UL — SIGNIFICANT CHANGE UP (ref 0–0.9)
MONOCYTES NFR BLD AUTO: 11.1 % — SIGNIFICANT CHANGE UP (ref 2–14)
MRSA PCR RESULT.: SIGNIFICANT CHANGE UP
NEUTROPHILS # BLD AUTO: 5.99 K/UL — SIGNIFICANT CHANGE UP (ref 1.8–7.4)
NEUTROPHILS NFR BLD AUTO: 73.9 % — SIGNIFICANT CHANGE UP (ref 43–77)
NRBC # BLD: 0 /100 WBCS — SIGNIFICANT CHANGE UP
NRBC # FLD: 0 K/UL — SIGNIFICANT CHANGE UP
PHOSPHATE SERPL-MCNC: 3.6 MG/DL — SIGNIFICANT CHANGE UP (ref 2.5–4.5)
PLATELET # BLD AUTO: 374 K/UL — SIGNIFICANT CHANGE UP (ref 150–400)
POTASSIUM SERPL-MCNC: 3.8 MMOL/L — SIGNIFICANT CHANGE UP (ref 3.5–5.3)
POTASSIUM SERPL-SCNC: 3.8 MMOL/L — SIGNIFICANT CHANGE UP (ref 3.5–5.3)
PROCALCITONIN SERPL-MCNC: 0.02 NG/ML — SIGNIFICANT CHANGE UP (ref 0.02–0.1)
PROT SERPL-MCNC: 6.7 G/DL — SIGNIFICANT CHANGE UP (ref 6–8.3)
RBC # BLD: 3.83 M/UL — LOW (ref 4.2–5.8)
RBC # FLD: 13.3 % — SIGNIFICANT CHANGE UP (ref 10.3–14.5)
S AUREUS DNA NOSE QL NAA+PROBE: SIGNIFICANT CHANGE UP
SODIUM SERPL-SCNC: 137 MMOL/L — SIGNIFICANT CHANGE UP (ref 135–145)
SPECIMEN SOURCE: SIGNIFICANT CHANGE UP
T-CELL CD4 SUBSET PNL BLD: 239 /UL — LOW (ref 489–1457)
WBC # BLD: 8.1 K/UL — SIGNIFICANT CHANGE UP (ref 3.8–10.5)
WBC # FLD AUTO: 8.1 K/UL — SIGNIFICANT CHANGE UP (ref 3.8–10.5)

## 2022-03-18 PROCEDURE — 99222 1ST HOSP IP/OBS MODERATE 55: CPT | Mod: GC

## 2022-03-18 PROCEDURE — 99223 1ST HOSP IP/OBS HIGH 75: CPT

## 2022-03-18 PROCEDURE — 99233 SBSQ HOSP IP/OBS HIGH 50: CPT | Mod: GC

## 2022-03-18 RX ORDER — HYDROMORPHONE HYDROCHLORIDE 2 MG/ML
1 INJECTION INTRAMUSCULAR; INTRAVENOUS; SUBCUTANEOUS EVERY 4 HOURS
Refills: 0 | Status: DISCONTINUED | OUTPATIENT
Start: 2022-03-18 | End: 2022-03-18

## 2022-03-18 RX ORDER — HYDROMORPHONE HYDROCHLORIDE 2 MG/ML
0.5 INJECTION INTRAMUSCULAR; INTRAVENOUS; SUBCUTANEOUS
Refills: 0 | Status: DISCONTINUED | OUTPATIENT
Start: 2022-03-18 | End: 2022-03-20

## 2022-03-18 RX ORDER — VANCOMYCIN HCL 1 G
1500 VIAL (EA) INTRAVENOUS ONCE
Refills: 0 | Status: COMPLETED | OUTPATIENT
Start: 2022-03-18 | End: 2022-03-18

## 2022-03-18 RX ORDER — VANCOMYCIN HCL 1 G
1500 VIAL (EA) INTRAVENOUS EVERY 12 HOURS
Refills: 0 | Status: DISCONTINUED | OUTPATIENT
Start: 2022-03-18 | End: 2022-03-19

## 2022-03-18 RX ORDER — PSYLLIUM SEED (WITH DEXTROSE)
1 POWDER (GRAM) ORAL
Refills: 0 | Status: DISCONTINUED | OUTPATIENT
Start: 2022-03-18 | End: 2022-03-28

## 2022-03-18 RX ORDER — HYDROMORPHONE HYDROCHLORIDE 2 MG/ML
1 INJECTION INTRAMUSCULAR; INTRAVENOUS; SUBCUTANEOUS
Refills: 0 | Status: DISCONTINUED | OUTPATIENT
Start: 2022-03-18 | End: 2022-03-20

## 2022-03-18 RX ORDER — VANCOMYCIN HCL 1 G
VIAL (EA) INTRAVENOUS
Refills: 0 | Status: DISCONTINUED | OUTPATIENT
Start: 2022-03-18 | End: 2022-03-19

## 2022-03-18 RX ORDER — HYDROMORPHONE HYDROCHLORIDE 2 MG/ML
0.5 INJECTION INTRAMUSCULAR; INTRAVENOUS; SUBCUTANEOUS EVERY 4 HOURS
Refills: 0 | Status: DISCONTINUED | OUTPATIENT
Start: 2022-03-18 | End: 2022-03-18

## 2022-03-18 RX ORDER — ACETAMINOPHEN 500 MG
650 TABLET ORAL EVERY 6 HOURS
Refills: 0 | Status: DISCONTINUED | OUTPATIENT
Start: 2022-03-18 | End: 2022-03-30

## 2022-03-18 RX ORDER — HYDROMORPHONE HYDROCHLORIDE 2 MG/ML
0.5 INJECTION INTRAMUSCULAR; INTRAVENOUS; SUBCUTANEOUS ONCE
Refills: 0 | Status: DISCONTINUED | OUTPATIENT
Start: 2022-03-18 | End: 2022-03-18

## 2022-03-18 RX ORDER — METHYLNALTREXONE BROMIDE 12 MG/.6ML
8 INJECTION, SOLUTION SUBCUTANEOUS DAILY
Refills: 0 | Status: DISCONTINUED | OUTPATIENT
Start: 2022-03-18 | End: 2022-03-19

## 2022-03-18 RX ORDER — PIPERACILLIN AND TAZOBACTAM 4; .5 G/20ML; G/20ML
3.38 INJECTION, POWDER, LYOPHILIZED, FOR SOLUTION INTRAVENOUS EVERY 8 HOURS
Refills: 0 | Status: COMPLETED | OUTPATIENT
Start: 2022-03-18 | End: 2022-03-25

## 2022-03-18 RX ADMIN — Medication 300 MILLIGRAM(S): at 12:39

## 2022-03-18 RX ADMIN — BICTEGRAVIR SODIUM, EMTRICITABINE, AND TENOFOVIR ALAFENAMIDE FUMARATE 1 TABLET(S): 30; 120; 15 TABLET ORAL at 14:08

## 2022-03-18 RX ADMIN — SENNA PLUS 2 TABLET(S): 8.6 TABLET ORAL at 04:10

## 2022-03-18 RX ADMIN — Medication 1 PACKET(S): at 21:28

## 2022-03-18 RX ADMIN — ZOLPIDEM TARTRATE 5 MILLIGRAM(S): 10 TABLET ORAL at 23:09

## 2022-03-18 RX ADMIN — HYDROMORPHONE HYDROCHLORIDE 8 MILLIGRAM(S): 2 INJECTION INTRAMUSCULAR; INTRAVENOUS; SUBCUTANEOUS at 04:10

## 2022-03-18 RX ADMIN — LACTULOSE 10 GRAM(S): 10 SOLUTION ORAL at 05:18

## 2022-03-18 RX ADMIN — MORPHINE SULFATE 60 MILLIGRAM(S): 50 CAPSULE, EXTENDED RELEASE ORAL at 22:40

## 2022-03-18 RX ADMIN — POLYETHYLENE GLYCOL 3350 17 GRAM(S): 17 POWDER, FOR SOLUTION ORAL at 04:10

## 2022-03-18 RX ADMIN — LACTULOSE 10 GRAM(S): 10 SOLUTION ORAL at 22:40

## 2022-03-18 RX ADMIN — ATORVASTATIN CALCIUM 10 MILLIGRAM(S): 80 TABLET, FILM COATED ORAL at 22:23

## 2022-03-18 RX ADMIN — POLYETHYLENE GLYCOL 3350 17 GRAM(S): 17 POWDER, FOR SOLUTION ORAL at 17:49

## 2022-03-18 RX ADMIN — PIPERACILLIN AND TAZOBACTAM 25 GRAM(S): 4; .5 INJECTION, POWDER, LYOPHILIZED, FOR SOLUTION INTRAVENOUS at 15:05

## 2022-03-18 RX ADMIN — Medication 50 MILLIGRAM(S): at 04:10

## 2022-03-18 RX ADMIN — MORPHINE SULFATE 60 MILLIGRAM(S): 50 CAPSULE, EXTENDED RELEASE ORAL at 16:15

## 2022-03-18 RX ADMIN — HYDROMORPHONE HYDROCHLORIDE 1 MILLIGRAM(S): 2 INJECTION INTRAMUSCULAR; INTRAVENOUS; SUBCUTANEOUS at 14:30

## 2022-03-18 RX ADMIN — Medication 1 MILLIGRAM(S): at 02:03

## 2022-03-18 RX ADMIN — HYDROMORPHONE HYDROCHLORIDE 0.5 MILLIGRAM(S): 2 INJECTION INTRAMUSCULAR; INTRAVENOUS; SUBCUTANEOUS at 07:54

## 2022-03-18 RX ADMIN — MORPHINE SULFATE 60 MILLIGRAM(S): 50 CAPSULE, EXTENDED RELEASE ORAL at 05:18

## 2022-03-18 RX ADMIN — PIPERACILLIN AND TAZOBACTAM 25 GRAM(S): 4; .5 INJECTION, POWDER, LYOPHILIZED, FOR SOLUTION INTRAVENOUS at 04:11

## 2022-03-18 RX ADMIN — GABAPENTIN 300 MILLIGRAM(S): 400 CAPSULE ORAL at 22:23

## 2022-03-18 RX ADMIN — LACTULOSE 10 GRAM(S): 10 SOLUTION ORAL at 17:51

## 2022-03-18 RX ADMIN — HYDROMORPHONE HYDROCHLORIDE 1 MILLIGRAM(S): 2 INJECTION INTRAMUSCULAR; INTRAVENOUS; SUBCUTANEOUS at 21:55

## 2022-03-18 RX ADMIN — HYDROMORPHONE HYDROCHLORIDE 1 MILLIGRAM(S): 2 INJECTION INTRAMUSCULAR; INTRAVENOUS; SUBCUTANEOUS at 12:52

## 2022-03-18 RX ADMIN — HYDROMORPHONE HYDROCHLORIDE 1 MILLIGRAM(S): 2 INJECTION INTRAMUSCULAR; INTRAVENOUS; SUBCUTANEOUS at 16:55

## 2022-03-18 RX ADMIN — OLANZAPINE 10 MILLIGRAM(S): 15 TABLET, FILM COATED ORAL at 22:23

## 2022-03-18 RX ADMIN — MORPHINE SULFATE 60 MILLIGRAM(S): 50 CAPSULE, EXTENDED RELEASE ORAL at 14:08

## 2022-03-18 RX ADMIN — LACTULOSE 10 GRAM(S): 10 SOLUTION ORAL at 12:39

## 2022-03-18 RX ADMIN — HYDROMORPHONE HYDROCHLORIDE 1 MILLIGRAM(S): 2 INJECTION INTRAMUSCULAR; INTRAVENOUS; SUBCUTANEOUS at 21:25

## 2022-03-18 RX ADMIN — SENNA PLUS 2 TABLET(S): 8.6 TABLET ORAL at 17:48

## 2022-03-18 RX ADMIN — GABAPENTIN 200 MILLIGRAM(S): 400 CAPSULE ORAL at 12:40

## 2022-03-18 RX ADMIN — Medication 300 MILLIGRAM(S): at 21:55

## 2022-03-18 RX ADMIN — GABAPENTIN 200 MILLIGRAM(S): 400 CAPSULE ORAL at 08:02

## 2022-03-18 RX ADMIN — HYDROMORPHONE HYDROCHLORIDE 1 MILLIGRAM(S): 2 INJECTION INTRAMUSCULAR; INTRAVENOUS; SUBCUTANEOUS at 19:12

## 2022-03-18 RX ADMIN — METHYLNALTREXONE BROMIDE 8 MILLIGRAM(S): 12 INJECTION, SOLUTION SUBCUTANEOUS at 22:41

## 2022-03-18 NOTE — CONSULT NOTE ADULT - PROBLEM SELECTOR RECOMMENDATION 6
Thank you for allowing us to participate in your patient's care. We will continue to follow with you. Please page [phone] for any q's or c's.     Terri Roberts D.O.   Palliative Medicine

## 2022-03-18 NOTE — CONSULT NOTE ADULT - ASSESSMENT
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 Patient also w/ small amounts of bloody stool, possibly from radiation associated vascular ectasia. However would not perform endoscopic evaluation at this time, given 1) stable Hg; 2) recent radiation therapy (<1 year) makes risk of treatment (i.e. APC) leading to deep ulceration and fistulous tract; and 3) ongoing fistulous tract may be made worse by procedure. In the interim, patient may benefit from sucralfate enemas for RAVE, however unlikely to tolerate it due to pain.     Recommendations:  - MRI pelvis  - sucralfate enemas as tolerated   - no plans for endoscopic therapy  - f/u surgery recs; can consider colorectal surgery evaluation of fistulous tract  - ensure patients stools remain soft; can include metamucil 2 tbsp bid as needed  - rest of care per primary team    GI will continue to follow.     All recommendations are tentative until note is attested by attending.     Keith Kitchen, PGY5  Gastroenterology/Hepatology Fellow  Available on [employer] Teams  [phone] ([employer] Short Range Pager)  [phone] (Long Range Pager)    After 5pm, please contact the on-call GI fellow. [phone] 57 yo M with prostate cancer s/p XRT, anal squamous cell carcinoma s/p resection/radiation and chemo (last XRT 8/2021), HIV on HAART, diverticulosis, BMI > 25 who presented with worsening rectal pain.     #Rectal pain: likely from perianal fistula and/or chronic ulcer from prior treatment (radiation vs resection of SCC).   #Rectal bleeding: with small amounts of bloody stool, possibly from radiation associated vascular ectasia or ulceration.     Recommendations:  - MRI pelvis  - can consider sucralfate enemas for possible RAVE if ongoing/worsening bleeding; however, given minimal bleeding at this time and significant anorectal pain, would defer enemas for now  - f/u surgery recs; can consider colorectal surgery evaluation for additional input re: management of suspected fistulous tract  - ensure patients stools remain soft; can include metamucil 2 tbsp bid as needed  - would defer endoscopic evaluation at this time, given 1) stable Hg; 2) recent radiation therapy (<1 year) with increased risk of treatment (i.e. APC) leading to deep ulceration and fistulous tract formation; and 3) ongoing fistulous tract may be made worse by procedure  - rest of care per primary team    GI will continue to follow.     All recommendations are tentative until note is attested by attending.     Keith Kitchen, PGY5  Gastroenterology/Hepatology Fellow  Available on [employer] Teams  [phone] ([employer] Short Range Pager)  [phone] (Long Range Pager)    After 5pm, please contact the on-call GI fellow. [phone]

## 2022-03-18 NOTE — CONSULT NOTE ADULT - PROBLEM SELECTOR RECOMMENDATION 2
Last BM 4 days ago. Patient reports difficulty with BM despite aggressive bowel regimen medications.   > On relistor daily per outpatient records- 450mg PO qday in AM   > can continue miralax bid, senna 2 tabs bid

## 2022-03-18 NOTE — CHART NOTE - NSCHARTNOTEFT_GEN_A_CORE
03/09/2022	03/09/2022	morphine sulf er 60 mg tablet	90	30	PadronNeena zhu	[id number]	Insurance	[facility]  03/09/2022	03/09/2022	hydromorphone 4 mg tablet	90	7	PadNeena escalona	[id number]	Insurance	[facility]  02/09/2022	02/23/2022	morphine sulf er 30 mg tablet	90	30	PadronNeena zhu	[id number]	Insurance	[facility]  02/09/2022	02/10/2022	oxycodone hcl (ir) 30 mg tab	90	15	PadNeena escalona	[id number]	Insurance	[facility]  01/26/2022	01/31/2022	oxycodone hcl (ir) 30 mg tab	90	15	PadNeena escalona	[id number]	Insurance	[facility]  01/26/2022	01/31/2022	morphine sulf er 15 mg tablet	90	30	PadNeena escalona	[id number]	Insurance	[facility]  01/12/2022	01/24/2022	oxycodone hcl (ir) 20 mg tab	90	15	Patricia Colon)	[id number]	Insurance	[facility]  01/12/2022	01/24/2022	morphine sulf er 30 mg tablet	90	30	Patricia Colon)	[id number]	Insurance	[facility]  12/27/2021	01/03/2022	oxycodone hcl (ir) 20 mg tab	90	15	Neena Borrego	[id number]	Insurance	[facility]  12/27/2021	01/03/2022	morphine sulf er 30 mg tablet	60	30	Neena Borrego	[id number]	Insurance	[facility]  12/15/2021	12/20/2021	oxycodone hcl (ir) 20 mg tab	90	15	Neena Borrego	[id number]	Insurance	[facility]  12/15/2021	12/20/2021	morphine sulf er 15 mg tablet	60	30	Neena Borrego	[id number]	Insurance	[facility]  12/01/2021	12/03/2021	oxycodone hcl (ir) 20 mg tab	90	15	Neena Borrego	[id number]	Insurance	[facility]  11/18/2021	11/18/2021	oxycodone hcl (ir) 20 mg tab	90	15	Patricia Colon)	[id number]	Insurance	[facility]  10/08/2021	10/08/2021	oxycodone hcl 10 mg tablet	84	21	Andres Smith	[id number]	Insurance	[facility]  08/31/2021	09/02/2021	fentanyl 25 mcg/hr patch	5	15	Andres Smith	[id number]	[facility]  08/27/2021	08/31/2021	oxycodone hcl 10 mg tablet	84	21	Andres Smith	[id number]	[facility]  08/27/2021	08/31/2021	oxycontin er 10 mg tablet	30	15	Andres Smith	[id number]	[facility]    Patient Name: David Barroso Date: 1965  Address: [address] [place] [id number]: Male  Rx Written	Rx Dispensed	Drug	Quantity	Days Supply	Prescriber Name	Prescriber Olga #	Payment Method	Dispenser  10/26/2021	02/21/2022	zolpidem tartrate 10 mg tablet	30	30	Hanh Yin	[id number]	[facility]  02/10/2022	02/21/2022	alprazolam 1 mg tablet	90	30	Hanh Yin	[id number]	[facility]  10/26/2021	01/21/2022	zolpidem tartrate 10 mg tablet	30	30	Hanh Yin	[id number]	[facility]  01/11/2022	01/21/2022	alprazolam 1 mg tablet	90	30	Hanh Yin	[id number]	[facility]  10/26/2021	12/23/2021	zolpidem tartrate 10 mg tablet	30	30	Hanh Yin	[id number]	[facility]  12/02/2021	12/06/2021	alprazolam 1 mg tablet	90	30	Hanh [id number]	[facility]  10/26/2021	11/24/2021	zolpidem tartrate 10 mg tablet	30	30	Hanh Yin	[id number]	[facility]  10/26/2021	10/26/2021	zolpidem tartrate 10 mg tablet	30	30	AnnamariaHanh medina MD	[id number]	[facility]  10/26/2021	10/26/2021	alprazolam 1 mg tablet	90	30	Hanh Yin MD	[id number]	[facility]  08/18/2021	09/27/2021	zolpidem tartrate 10 mg tablet	30	30	AnnamariaHanh medina MD	[id number]	[facility]  09/23/2021	09/27/2021	alprazolam 1 mg tablet	90	30	AnnamariaHanh medina MD	[id number]	[facility]  08/18/2021	08/25/2021	alprazolam 1 mg tablet	90	30	Hanh Yin MD	[id number]	[facility]  08/18/2021	08/25/2021	zolpidem tartrate 10 mg tablet	30	30	AnnamariaHanh medina MD	[id number]	[facility]  03/31/2021	07/26/2021	zolpidem tartrate 10 mg tablet	30	30	AnnamariaHanh medina MD	[id number]	[facility]  07/07/2021	07/26/2021	alprazolam 1 mg tablet	90	30	Hanh Yin MD	[id number]	[facility]  03/31/2021	06/28/2021	zolpidem tartrate 10 mg tablet	30	30	Hanh Yin MD	[id number]	[facility]  06/03/2021	06/28/2021	alprazolam 1 mg tablet	90	30	Hanh Yin MD	[id number]	[facility]  03/31/2021	05/28/2021	zolpidem tartrate 10 mg tablet	30	30	AnnamariaHanh medina MD	[id number]	[facility]  04/29/2021	05/28/2021	alprazolam 1 mg tablet	90	30	Annamaria, Hanh N MD	[id number]	Insurance	[facility]  03/31/2021	04/26/2021	alprazolam 1 mg tablet	90	30	AnnamariaHanh medina	[id number]	Insurance	[facility]  03/31/2021	04/26/2021	zolpidem tartrate 10 mg tablet	30	30	AnnamariaHanh medina	[id number]	[facility]  11/05/2020	03/26/2021	zolpidem tartrate 10 mg tablet	30	30	AnnamariaHanh medina	[id number]	[facility]  03/03/2021	03/26/2021	alprazolam 1 mg tablet	90	30	AnnamariaHanh medina	[id number]	[facility]    Patient Name: David Barroso Date: 1965  Address: [address] [place] [id number]: Male  Rx Written	Rx Dispensed	Drug	Quantity	Days Supply	Prescriber Name	Prescriber Olga #	Payment Method	Dispenser  11/08/2021	11/09/2021	oxycodone hcl 10 mg tablet	30	7	Eh Valencia MD	[id number]	Medicaid	[id number]  10/28/2021	10/28/2021	oxycodone hcl 10 mg tablet	30	7	Estephanie Alvarez I, SAEED	[id number]	Medicaid	[id number]  08/25/2021	08/25/2021	oxycodone hcl 5 mg tablet	30	5	More Leonard Agpcnp-Bc	[id number]	Medicaid	[id number]  08/19/2021	08/19/2021	oxycodone hcl 5 mg tablet	30	5	Lou Merchant MD	[id number]	Medicaid	[id number]  04/06/2021	04/07/2021	oxycodone-acetaminophen 5-325 mg tablet	20	4	Dewayne Kelsey [id number]	Medicaid	[id number]

## 2022-03-18 NOTE — CONSULT NOTE ADULT - ASSESSMENT
57 y/o M w/ PMH rectal cancer s/p radiation, prostate cancer s/p radiation, HIV on biktarvy, diverticulosis w/ h/o diverticulitis, anxiety, depression, HTN, chronic constipation presents with uncontrollable rectal pain, bleeding, subjective fevers, chills, diaphoresis, and difficulty ambulating. Palliative consulted for symptom management in setting of rectal cancer.

## 2022-03-18 NOTE — PROGRESS NOTE ADULT - PROBLEM SELECTOR PLAN 11
DVT Prophylaxis: hold 2/2 anal bleed  Diet: regular  Code Status: full code  Dispo: pending clinical course

## 2022-03-18 NOTE — PHYSICAL THERAPY INITIAL EVALUATION ADULT - ADDITIONAL COMMENTS
Patient lives with wife in apartment, no steps to negotiate. patient was independent in ambulation with cane. patient was getting assist from wife with ADL.

## 2022-03-18 NOTE — CONSULT NOTE ADULT - PROBLEM SELECTOR RECOMMENDATION 4
>appreciate GI recs - consider sucralafate enemas if ongoing bleeding if patient can tolerate   > f/u MRI   > f/u colorectal surgery reconsult  > On IV abx   > pain management as above

## 2022-03-18 NOTE — PROGRESS NOTE ADULT - PROBLEM SELECTOR PLAN 2
-patient meets SIRS criteria w/ tachycardia and tachypnea  -subjective fevers and chills at home, however afebrile in ED and per patient all measured temps at home <100  -no infectious symptoms  -blood and urine cultures drawn  -received 1 dose vanc/zosyn in ED    Plan: given HIV and possible fistula would continue empiric antibiotics pending culture results with zosyn

## 2022-03-18 NOTE — PROGRESS NOTE ADULT - ASSESSMENT
55 y/o M w/ PMH rectal cancer s/p radiation, prostate cancer s/p radiation, HIV on biktarvy, diverticulosis w/ h/o diverticulitis, anxiety, depression, HTN, chronic constipation presents with uncontrollable rectal pain, bleeding, subjective fevers, chills, diaphoresis, and difficulty ambulating.

## 2022-03-18 NOTE — PROGRESS NOTE ADULT - ATTENDING COMMENTS
A 55 y/o M with PMH anal cancer (dx 4/2021) s/p radiation and chemo (last 8/2021) complicated by an ulcer, prostate cancer (dx 2016) s/p radiation (no longer requiring tx), HIV on Biktarvy, HTN, diverticulosis w/ h/o diverticulitis, anxiety, depression, and chronic constipation presents with uncontrollable rectal pain with hematochezia. Surgery following. CTAP showing possible fistula vs. abscess. MRI pelvis pending. On Vanc and Zosyn, will deescalate antibiotics pending blood cultures. GI consult pending for possible endoscopic evaluation for hematochezia, likely from radiation proctitis. Palliative consult placed for pain management. MRI L spine also pending to r/o cord compression/cauda equina causing new gait instability with b/l LE pain. DC pending hospital course.

## 2022-03-18 NOTE — PROGRESS NOTE ADULT - PROBLEM SELECTOR PLAN 3
-acute on chronic gait instability over the past 3 days  -neuro exam benign, 5/5 in UE b/l, 5/5 strength in RLE, and 4/5 strength in LLE which he states is his baseline  -usually ambulates without assistance, now is using cane  -more likely 2/2 anal pain radiating down legs, less likely due to other causes such as new mets    Plan: PT alaina, monitor pending pain control, MR lumbosacral spine -acute on chronic gait instability over the past 3 days  -neuro exam benign, 5/5 in UE b/l, 5/5 strength in RLE, and 4/5 strength in LLE which he states is his baseline  -usually ambulates without assistance, now is using cane  -more likely 2/2 anal pain radiating down legs, less likely due to other causes such as new mets    Plan: PT alaina, monitor pending pain control,

## 2022-03-18 NOTE — PROGRESS NOTE ADULT - PROBLEM SELECTOR PLAN 5
-patient compliant with biktarvy, will continue inpatient. CD4 count 947 in 5/21  -CD4 count in AM -patient compliant with biktarvy, will continue inpatient. CD4 count 947 in 5/21  F/u CD4 count

## 2022-03-18 NOTE — PROGRESS NOTE ADULT - SUBJECTIVE AND OBJECTIVE BOX
SURGERY PROGRESS NOTE:    ========================================  A TEAM PAGER   ========================================    Subjective:  Pt continues to have perianal pain this AM. Is passing flatus and having bowel movements.      Objective:    PE:  Gen: NAD  Resp: airway patent, respirations unlabored, no increased WoB  CVS: RRR  Abd: soft, ND, NT, no rebound or guarding  Ext: no edema, WWP  Neuro: AAOx3, no focal deficits    Vital Signs Last 24 Hrs  T(C): 37.3 (18 Mar 2022 04:34), Max: 37.8 (17 Mar 2022 13:00)  T(F): 99.1 (18 Mar 2022 04:34), Max: 100 (17 Mar 2022 13:00)  HR: 118 (18 Mar 2022 04:34) (99 - 118)  BP: 148/91 (18 Mar 2022 04:34) (102/73 - 148/91)  BP(mean): --  RR: 18 (18 Mar 2022 04:34) (17 - 22)  SpO2: 95% (18 Mar 2022 04:34) (94% - 99%)    I&O's Detail      Daily Height in cm: 182.88 (18 Mar 2022 00:12)    Daily     MEDICATIONS  (STANDING):  atorvastatin 10 milliGRAM(s) Oral at bedtime  bictegravir 50 mG/emtricitabine 200 mG/tenofovir alafenamide 25 mG (BIKTARVY) 1 Tablet(s) Oral daily  gabapentin 200 milliGRAM(s) Oral <User Schedule>  gabapentin 300 milliGRAM(s) Oral at bedtime  lactulose Syrup 10 Gram(s) Oral four times a day  metoprolol succinate ER 50 milliGRAM(s) Oral daily  morphine ER Tablet 60 milliGRAM(s) Oral every 8 hours  OLANZapine 10 milliGRAM(s) Oral at bedtime  piperacillin/tazobactam IVPB.. 3.375 Gram(s) IV Intermittent every 8 hours  polyethylene glycol 3350 17 Gram(s) Oral two times a day  senna 2 Tablet(s) Oral two times a day    MEDICATIONS  (PRN):  acetaminophen     Tablet .. 650 milliGRAM(s) Oral every 6 hours PRN Temp greater or equal to 38C (100.4F), Mild Pain (1 - 3)  ALPRAZolam 1 milliGRAM(s) Oral daily PRN anxiety  HYDROmorphone  Injectable 1 milliGRAM(s) IV Push every 4 hours PRN Severe Pain (7 - 10)  HYDROmorphone  Injectable 0.5 milliGRAM(s) IV Push every 4 hours PRN Moderate Pain (4 - 6)  zolpidem 5 milliGRAM(s) Oral at bedtime PRN Insomnia  zolpidem 5 milliGRAM(s) Oral at bedtime PRN Insomnia      LABS:                        11.4   8.56  )-----------( 413      ( 17 Mar 2022 13:30 )             36.2     03-17    140  |  101  |  13  ----------------------------<  104<H>  4.1   |  26  |  0.82    Ca    9.7      17 Mar 2022 13:30    TPro  7.3  /  Alb  3.6  /  TBili  0.3  /  DBili  x   /  AST  16  /  ALT  10  /  AlkPhos  83  03-17    PT/INR - ( 17 Mar 2022 13:30 )   PT: 14.7 sec;   INR: 1.26 ratio         PTT - ( 17 Mar 2022 13:30 )  PTT:31.4 sec  Urinalysis Basic - ( 17 Mar 2022 14:48 )    Color: Yellow / Appearance: Clear / S.021 / pH: x  Gluc: x / Ketone: Negative  / Bili: Negative / Urobili: <2 mg/dL   Blood: x / Protein: Trace / Nitrite: Negative   Leuk Esterase: Negative / RBC: 1 /HPF / WBC 1 /HPF   Sq Epi: x / Non Sq Epi: 0 /HPF / Bacteria: Negative        RADIOLOGY & ADDITIONAL STUDIES:

## 2022-03-18 NOTE — PROGRESS NOTE ADULT - ASSESSMENT
56yM w/ Possible perianal fistula    - c/w current medical management  - Pt will require pelvic MRI to assess fro fistula  - No acute surgical intervention at this time  - Please page [phone] w/ any questions    JW Holden PGY-3

## 2022-03-18 NOTE — CONSULT NOTE ADULT - SUBJECTIVE AND OBJECTIVE BOX
[facility] and Palliative Care  Terri Roberts, Palliative Care Attending  Contact Info: Page [phone] (including Nights/Weekends), message on Microsoft Teams (Terri Roberts), or leave  at Palliative Office [phone] (non-urgent)     HPI:  55 y/o M with PMH anal cancer (dx 2021) s/p radiation and chemo (last 2021) complicated by an ulcer, prostate cancer (dx 2016) s/p radiation (no longer requiring tx), HIV on Biktarvy, HTN, diverticulosis w/ h/o diverticulitis, anxiety, depression, and chronic constipation presents with uncontrollable rectal pain with bleeding, subjective fevers, chills, diaphoresis, and difficulty ambulating. Patient was told he is cancer free after last radiation therapy in 2021. Since then, though, he has been having increased rectal pain uncontrolled by pain regimen managed by Dr. Colon (PO morphine ER 60 mg q8h, PO dilaudid 4-8 mg q4h PRN). Pain has progressively worsened over the past number of months and is now associated with new-onset difficulty ambulating. At baseline, patient walks "slowly" on his own without assistance from his house to the car. Needs assistance from wife with everyday activities such as dressing himself. Over the past few days, patient has required use of cane to ambulate. Patient endorses one episode of significant bowel incontinence about 3 weeks ago and multiple small episodes since then. Denies any urinary incontinence. Patient consistently has blood in the toilet bowl, no clots, and blood on toilet paper after bowel movements. Over the past 3 days, patient has had subjective fevers, however has taken multiple measurements of temp and highest has been 99-100F. Additionally patient has been having chills and diaphoresis. Patient also has chronic constipation for which he is prescribed lactulose, however this has not worked for him. Has not moved his bowels in 3 days. Denies chest pain, SOB, cough, N/V, headache, rash, numbness including saddle anesthesia, dysuria, or hematuria.  (17 Mar 2022 23:24)    > 3/18: Met with patient at bedside, introduced team and role of palliative care in hospital. Patient expressed understanding. He shared that he has been experiencing rectal pain x4 months, described as sharp radiating to his groin, constant but is relieved with IV pain medications. Discussed trialing IV dilaudid 1mg as patient states 0.5mg is only partially alleviating his pain. Patient reports difficulty ambulating and chronic constipation.     Over the 24 hours, patient required PRNs of IV dilaudid 0.5mg x2, PO dilaudid 8mg x2, Xanax 1mg, IV fentanyl 50mcg x3.     PERTINENT PM/SXH:   DM (diabetes mellitus)    HTN (hypertension)    HLD (hyperlipidemia)    HIV infection    Depressive disorder    Anxiety    Prostate cancer    Anal cancer    Diverticulosis      No significant past surgical history    Anal lesion      FAMILY HISTORY:  No pertinent family history in first degree relatives      ITEMS NOT CHECKED ARE NOT PRESENT    SOCIAL HISTORY:   Significant other/partner[x ]  Children[ ]  Mu-ism/Spirituality:  Substance hx:  [ ]   Tobacco hx:  [ ]   Alcohol hx: [ ]   Home Opioid hx: see separate chart note [ ] I-Stop Reference No:  [id number]  Living Situation: [x ]Home  [ ]Long term care  [ ]Rehab [ ]Other    ADVANCE DIRECTIVES:    MOLST  [ ]  Living Will  [ ]   DECISION MAKER(s): Patient with decision making capacity at this time. He is , no children.   [ ] Health Care Proxy(s)  [ ] Surrogate(s)  [ ] Guardian           Name(s): Trinity (wife) Phone Number(s): [phone]    BASELINE (I)ADL(s) (prior to admission):  [place]: [ ]Total  [x ] Moderate [ ]Dependent    Allergies    No Known Allergies    Intolerances    MEDICATIONS  (STANDING):  atorvastatin 10 milliGRAM(s) Oral at bedtime  bictegravir 50 mG/emtricitabine 200 mG/tenofovir alafenamide 25 mG (BIKTARVY) 1 Tablet(s) Oral daily  gabapentin 200 milliGRAM(s) Oral <User Schedule>  gabapentin 300 milliGRAM(s) Oral at bedtime  lactulose Syrup 10 Gram(s) Oral four times a day  metoprolol succinate ER 50 milliGRAM(s) Oral daily  morphine ER Tablet 60 milliGRAM(s) Oral every 8 hours  OLANZapine 10 milliGRAM(s) Oral at bedtime  piperacillin/tazobactam IVPB.. 3.375 Gram(s) IV Intermittent every 8 hours  polyethylene glycol 3350 17 Gram(s) Oral two times a day  senna 2 Tablet(s) Oral two times a day  vancomycin  IVPB      vancomycin  IVPB 1500 milliGRAM(s) IV Intermittent every 12 hours    MEDICATIONS  (PRN):  acetaminophen     Tablet .. 650 milliGRAM(s) Oral every 6 hours PRN Temp greater or equal to 38C (100.4F), Mild Pain (1 - 3)  ALPRAZolam 1 milliGRAM(s) Oral daily PRN anxiety  HYDROmorphone  Injectable 1 milliGRAM(s) IV Push every 4 hours PRN Severe Pain (7 - 10)  HYDROmorphone  Injectable 0.5 milliGRAM(s) IV Push every 4 hours PRN Moderate Pain (4 - 6)  zolpidem 5 milliGRAM(s) Oral at bedtime PRN Insomnia  zolpidem 5 milliGRAM(s) Oral at bedtime PRN Insomnia    PRESENT SYMPTOMS: [ ]Unable to obtain due to poor mentation   Source if other than patient:  [ ]Family   [ ]Team     Pain: [x ]yes [ ]no  QOL impact - difficulty ambulating   Location -    rectal pain, b/l leg pain                Aggravating factors - pain is constant, no pattern identified   Quality - sharp and stabbing   Radiation - rectal pain radiates to anterior groin   Timing- constant   Severity (0-10 scale): did not describe   Minimal acceptable level (0-10 scale): did not describe     PAIN AD Score:     [email or web address] (press ctrl +  left click to view)    Dyspnea:                           [ ]Mild [ ]Moderate [ ]Severe  Anxiety:                             [ ]Mild [ ]Moderate [ ]Severe  Fatigue:                             [ ]Mild [ ]Moderate [ ]Severe  Nausea:                             [ ]Mild [ ]Moderate [ ]Severe  Loss of appetite:              [ ]Mild [ ]Moderate [ ]Severe  Constipation:                    [ ]Mild [ ]Moderate [ x]Severe    Other Symptoms:  [x ]All other review of systems negative     Palliative Performance Status Version 2:      60   %    http://npcrc.org/files/news/palliative_performance_scale_ppsv2.pdf  PHYSICAL EXAM:  Vital Signs Last 24 Hrs  T(C): 37.3 (18 Mar 2022 12:25), Max: 37.3 (17 Mar 2022 22:22)  T(F): 99.2 (18 Mar 2022 12:25), Max: 99.2 (17 Mar 2022 22:22)  HR: 103 (18 Mar 2022 12:25) (103 - 118)  BP: 125/87 (18 Mar 2022 12:25) (124/94 - 148/91)  BP(mean): --  RR: 18 (18 Mar 2022 12:25) (17 - 18)  SpO2: 99% (18 Mar 2022 12:25) (95% - 99%) I&O's Summary    GENERAL:  [x ]Alert  [x ]Oriented x4   [ ]Lethargic  [ ]Cachexia  [ ]Unarousable  [x ]Verbal  [ ]Non-Verbal  Behavioral:   [ ] Anxiety  [ ] Delirium [ ] Agitation [x ] Other  HEENT:  [ x]Normal   [ ]Dry mouth   [ ]ET Tube/Trach  [ ]Oral lesions  PULMONARY:   [x ]Clear [ ]Tachypnea  [ ]Audible excessive secretions   [ ]Rhonchi        [ ]Right [ ]Left [ ]Bilateral  [ ]Crackles        [ ]Right [ ]Left [ ]Bilateral  [ ]Wheezing     [ ]Right [ ]Left [ ]Bilateral  [ ]Diminished breath sounds [ ]right [ ]left [ ]bilateral  CARDIOVASCULAR:    [ ]Regular [ ]Irregular [x ]Tachy  [ ]Vladimir [ ]Murmur [ ]Other  GASTROINTESTINAL:  [ x]Soft  [ ]Distended   [ ]+BS  [ ]Non tender [ ]Tender  [ ]PEG [ ]OGT/ NGT  Last BM: 3/14   GENITOURINARY: Uniformly surrounded by erythematous indurated skin, no fluctuance, yellow drainage around anal verge [ [ [ [ ]Normal [ ] Incontinent   [ ]Oliguria/Anuria   [ ][place]ahu  MUSCULOSKELETAL:   [ ]Normal   [ ]Weakness  [ ]Bed/Wheelchair bound [ ]Edema  NEUROLOGIC:   [x ]No focal deficits  [ ]Cognitive impairment  [ ]Dysphagia [ ]Dysarthria [ ]Paresis [ ]Other   SKIN: please see flowsheets   [ ]Normal    [ ]Rash  [ ]Pressure ulcer(s)       Present on admission [ ]y [ ]n    CRITICAL CARE:  [ ] Shock Present  [ ]Septic [ ]Cardiogenic [ ]Neurologic [ ]Hypovolemic  [ ]  Vasopressors [ ]  Inotropes   [ ]Respiratory failure present [ ]Mechanical ventilation [ ]Non-invasive ventilatory support [ ]High flow  [ ]Acute  [ ]Chronic [ ]Hypoxic  [ ]Hypercarbic [ ]Other  [ ]Other organ failure     LABS:                        10.6   8.10  )-----------( 374      ( 18 Mar 2022 10:06 )             32.9   03-18    137  |  102  |  10  ----------------------------<  150<H>  3.8   |  25  |  0.68    Ca    9.1      18 Mar 2022 10:06  Phos  3.6     -  Mg     2.10     -18    TPro  6.7  /  Alb  3.4  /  TBili  0.4  /  DBili  x   /  AST  14  /  ALT  9   /  AlkPhos  69  -18  PT/INR - ( 17 Mar 2022 13:30 )   PT: 14.7 sec;   INR: 1.26 ratio         PTT - ( 17 Mar 2022 13:30 )  PTT:31.4 sec    Urinalysis Basic - ( 17 Mar 2022 14:48 )    Color: Yellow / Appearance: Clear / S.021 / pH: x  Gluc: x / Ketone: Negative  / Bili: Negative / Urobili: <2 mg/dL   Blood: x / Protein: Trace / Nitrite: Negative   Leuk Esterase: Negative / RBC: 1 /HPF / WBC 1 /HPF   Sq Epi: x / Non Sq Epi: 0 /HPF / Bacteria: Negative      RADIOLOGY & ADDITIONAL STUDIES: < from: CT Abdomen and Pelvis w/ Oral Cont and w/ IV Cont (22 @ 15:54) >    IMPRESSION:  Limited evaluation for residual sinus tract. A small droplet of gas is   noted in the left intersphincteric space with associated edema, similar   in appearance to prior CT dated 2022. There is an appearance   concerning for residual tract or collection as described above. MRI is   recommended to better delineate possible fistulae.    < end of copied text >      PROTEIN CALORIE MALNUTRITION PRESENT: [ ]mild [ ]moderate [ ]severe [ ]underweight [ ]morbid obesity  https://www.andeal.org/vault/5600/web/files/ONC/Table_Clinical%20Characteristics%20to%20Document%20Malnutrition-White%20JV%20et%20al%2020.pdf    Height (cm): 182.9 (22 @ 00:12), 180.3 (21 @ 06:50), 180.3 (21 @ 10:02)  Weight (kg): 95 (22 @ 00:12), 101.6 (21 @ 06:50), 101.6 (21 @ 10:02)  BMI (kg/m2): 28.4 (22 @ 00:12), 31.3 (21 @ 06:50), 31.3 (21 @ 10:02)    [ ]PPSV2 < or = to 30% [ ]significant weight loss  [ ]poor nutritional intake  [ ]anasarca      [ ]Artificial Nutrition      REFERRALS:   [ ]Chaplaincy  [ ]Hospice  [ ]Child Life  [ ]Social Work  [ ]Case management [ ]Holistic Therapy

## 2022-03-18 NOTE — CONSULT NOTE ADULT - SUBJECTIVE AND OBJECTIVE BOX
HPI:  55 yo M w/ PMHx prostate Ca (s/p XRT), rectal ca (s/p XRT, last 2021 w/ no further cancer directed therapy), HIV, diverticulosis, anxiety, depression, morbid obesity p/w worsening rectal pain x 2 months. Patient states for past few months having worsening rectal pain. Pain w/ bowel movements and w/o bowel movements. Has small amounts of blood mixed with soft stools during bowel movements. Patient also having mucous discharge even without bowel movements, prompting him to wear pads all day. No abd pain. No nausea/vomiting.   Patient had MRI 2021 c/f possible fistulous tract vs sinus from intersphincter source. CT 2022 showing same thing.     On admission patient tachycardic, otherweise HDS. No leukocytosis. Given vanc/zosyn and admitted to medicine.     Allergies:  No Known Allergies      Home Medications:    Hospital Medications:  acetaminophen     Tablet .. 650 milliGRAM(s) Oral every 6 hours PRN  ALPRAZolam 1 milliGRAM(s) Oral daily PRN  atorvastatin 10 milliGRAM(s) Oral at bedtime  bictegravir 50 mG/emtricitabine 200 mG/tenofovir alafenamide 25 mG (BIKTARVY) 1 Tablet(s) Oral daily  gabapentin 200 milliGRAM(s) Oral <User Schedule>  gabapentin 300 milliGRAM(s) Oral at bedtime  HYDROmorphone  Injectable 1 milliGRAM(s) IV Push every 4 hours PRN  HYDROmorphone  Injectable 0.5 milliGRAM(s) IV Push every 4 hours PRN  lactulose Syrup 10 Gram(s) Oral four times a day  metoprolol succinate ER 50 milliGRAM(s) Oral daily  morphine ER Tablet 60 milliGRAM(s) Oral every 8 hours  OLANZapine 10 milliGRAM(s) Oral at bedtime  piperacillin/tazobactam IVPB.. 3.375 Gram(s) IV Intermittent every 8 hours  polyethylene glycol 3350 17 Gram(s) Oral two times a day  senna 2 Tablet(s) Oral two times a day  vancomycin  IVPB      zolpidem 5 milliGRAM(s) Oral at bedtime PRN  zolpidem 5 milliGRAM(s) Oral at bedtime PRN      PMHX/PSHX:  DM (diabetes mellitus)    HTN (hypertension)    HLD (hyperlipidemia)    HIV infection    Depressive disorder    Anxiety    Prostate cancer    Anal cancer    Diverticulosis    No significant past surgical history    Anal lesion        Family history:  No pertinent family history in first degree relatives        Denies family history of colon cancer/polyps, stomach cancer/polyps, pancreatic cancer/masses, liver cancer/disease, ovarian cancer and endometrial cancer.    Social History:   Tob: Denies  EtOH: Denies  Illicit Drugs: Denies    ROS:     General:  No wt loss, fevers, chills, night sweats, fatigue  Eyes:  Good vision, no reported pain  ENT:  No sore throat, pain, runny nose, dysphagia  CV:  No pain, palpitations, hypo/hypertension  Pulm:  No dyspnea, cough, tachypnea, wheezing  GI:  see HPI  :  No pain, bleeding, incontinence, nocturia  Muscle:  No pain, weakness  Neuro:  No weakness, tingling, memory problems  Psych:  No fatigue, insomnia, mood problems, depression  Endocrine:  No polyuria, polydipsia, cold/heat intolerance  Heme:  No petechiae, ecchymosis, easy bruisability  Skin:  No rash, tattoos, scars, edema    PHYSICAL EXAM:     GENERAL:  No acute distress  HEENT:  NCAT, no scleral icterus   CHEST:  no respiratory distress  HEART:  Regular rate and rhythm  ABDOMEN:  Soft, non-tender, non-distended, normoactive bowel sounds,  no masses  RECTAL: perianal fistula w/ mucous discharge at 6 o'clock from anus; unable to tolerate ELISA due to pain  EXTREMITIES: No edema  SKIN:  No rash/erythema/ecchymoses/petechiae/wounds/abscess/warm/dry  NEURO:  Alert and oriented x 3, no asterixis    Vital Signs:  Vital Signs Last 24 Hrs  T(C): 37.3 (18 Mar 2022 04:34), Max: 37.8 (17 Mar 2022 13:00)  T(F): 99.1 (18 Mar 2022 04:34), Max: 100 (17 Mar 2022 13:00)  HR: 118 (18 Mar 2022 04:34) (99 - 118)  BP: 148/91 (18 Mar 2022 04:34) (102/73 - 148/91)  BP(mean): --  RR: 18 (18 Mar 2022 04:34) (17 - 22)  SpO2: 95% (18 Mar 2022 04:34) (94% - 99%)  Daily Height in cm: 182.88 (18 Mar 2022 00:12)    Daily     LABS:                        11.4   8.56  )-----------( 413      ( 17 Mar 2022 13:30 )             36.2     Mean Cell Volume: 88.7 fL (22 @ 13:30)        140  |  101  |  13  ----------------------------<  104<H>  4.1   |  26  |  0.82    Ca    9.7      17 Mar 2022 13:30    TPro  7.3  /  Alb  3.6  /  TBili  0.3  /  DBili  x   /  AST  16  /  ALT  10  /  AlkPhos  83      LIVER FUNCTIONS - ( 17 Mar 2022 13:30 )  Alb: 3.6 g/dL / Pro: 7.3 g/dL / ALK PHOS: 83 U/L / ALT: 10 U/L / AST: 16 U/L / GGT: x           PT/INR - ( 17 Mar 2022 13:30 )   PT: 14.7 sec;   INR: 1.26 ratio         PTT - ( 17 Mar 2022 13:30 )  PTT:31.4 sec  Urinalysis Basic - ( 17 Mar 2022 14:48 )    Color: Yellow / Appearance: Clear / S.021 / pH: x  Gluc: x / Ketone: Negative  / Bili: Negative / Urobili: <2 mg/dL   Blood: x / Protein: Trace / Nitrite: Negative   Leuk Esterase: Negative / RBC: 1 /HPF / WBC 1 /HPF   Sq Epi: x / Non Sq Epi: 0 /HPF / Bacteria: Negative                              11.4   8.56  )-----------( 413      ( 17 Mar 2022 13:30 )             36.2       Imaging:  CT A/P 3/17/22  FINDINGS:  LOWER CHEST: Linear atelectasis in the right middle lobe and lingula.    LIVER: Within normal limits.  BILE DUCTS: Normal caliber.  GALLBLADDER: Within normal limits.  SPLEEN: Within normal limits.  PANCREAS: Within normal limits.  ADRENALS: Within normal limits.  KIDNEYS/URETERS: No hydronephrosis. Bilateral cysts. Additional   subcentimeter hypodense foci are too small to characterize.    BLADDER: Within normal limits.  REPRODUCTIVE ORGANS: Prostate gland is within normal limits.    BOWEL: No bowel obstruction. Appendix is within normal limits. A tiny   focus of gas is again noted in the left intersphincteric space (2:133)   with associated edema. An appearance concerning for residual tract or   collection is noted, best seen on sagittal imaging (602:88), measuring   approximately 3.2 x 1.7 cm.  PERITONEUM: No ascites.  VESSELS: Atherosclerotic changes. IVC filter. Replaced right hepatic   artery arising from the superior mesenteric artery.  RETROPERITONEUM/LYMPH NODES: No lymphadenopathy.  ABDOMINAL WALL: Within normal limits.  BONES: Degenerative changes.    IMPRESSION:  Limited evaluation for residual sinus tract. A small droplet of gas is   noted in the left intersphincteric space with associated edema, similar   in appearance to prior CT dated 2022. There is an appearance   concerning for residual tract or collection as described above. MRI is   recommended to better delineate possible fistulae.           HPI:  57 yo M with prostate cancer s/p XRT, anal squamous cell carcinoma s/p resection/radiation and chemo (last XRT 2021), HIV on HAART, diverticulosis, BMI > 25 who presented with worsening rectal pain.     Patient states for past few months he has been having worsening rectal pain. Pain w/ bowel movements and w/o bowel movements. Has small amounts of blood mixed with soft stools during bowel movements. Patient also having mucous discharge even without bowel movements, prompting him to wear pads all day. No abd pain. No nausea/vomiting.   Patient had MRI 2021 c/f possible fistulous tract vs sinus from intersphincteric source. CT 2022 showing similar findings.     On admission patient tachycardic, otherwise HDS. No leukocytosis. Given vanc/zosyn and admitted to medicine.     Allergies:  No Known Allergies      Home Medications:    Hospital Medications:  acetaminophen     Tablet .. 650 milliGRAM(s) Oral every 6 hours PRN  ALPRAZolam 1 milliGRAM(s) Oral daily PRN  atorvastatin 10 milliGRAM(s) Oral at bedtime  bictegravir 50 mG/emtricitabine 200 mG/tenofovir alafenamide 25 mG (BIKTARVY) 1 Tablet(s) Oral daily  gabapentin 200 milliGRAM(s) Oral <User Schedule>  gabapentin 300 milliGRAM(s) Oral at bedtime  HYDROmorphone  Injectable 1 milliGRAM(s) IV Push every 4 hours PRN  HYDROmorphone  Injectable 0.5 milliGRAM(s) IV Push every 4 hours PRN  lactulose Syrup 10 Gram(s) Oral four times a day  metoprolol succinate ER 50 milliGRAM(s) Oral daily  morphine ER Tablet 60 milliGRAM(s) Oral every 8 hours  OLANZapine 10 milliGRAM(s) Oral at bedtime  piperacillin/tazobactam IVPB.. 3.375 Gram(s) IV Intermittent every 8 hours  polyethylene glycol 3350 17 Gram(s) Oral two times a day  senna 2 Tablet(s) Oral two times a day  vancomycin  IVPB      zolpidem 5 milliGRAM(s) Oral at bedtime PRN  zolpidem 5 milliGRAM(s) Oral at bedtime PRN      PMHX/PSHX:  DM (diabetes mellitus)    HTN (hypertension)    HLD (hyperlipidemia)    HIV infection    Depressive disorder    Anxiety    Prostate cancer    Anal cancer    Diverticulosis    No significant past surgical history    Anal lesion      Family history:  No pertinent family history in first degree relatives    Social History:   Tob: Denies  EtOH: Denies  Illicit Drugs: Denies    ROS:   14-point ROS reviewed and negative except as per HPI above    PHYSICAL EXAM:     GENERAL:  No acute distress  HEENT:  NCAT, no scleral icterus   CHEST:  no respiratory distress  HEART:  CTAB Regular rate and rhythm, no murmurs  ABDOMEN:  Soft, non-tender, non-distended, normoactive bowel sounds,  no masses  RECTAL: perianal fistula w/ mucous discharge at 6 o'clock from anus; unable to tolerate ELISA due to pain  EXTREMITIES: No edema  SKIN:  No rash/erythema/ecchymoses/petechiae/wounds/abscess/warm/dry  NEURO/PSYCH: Appropriate affect, calm, alert and oriented x 3, no asterixis    Vital Signs:  Vital Signs Last 24 Hrs  T(C): 37.3 (18 Mar 2022 04:34), Max: 37.8 (17 Mar 2022 13:00)  T(F): 99.1 (18 Mar 2022 04:34), Max: 100 (17 Mar 2022 13:00)  HR: 118 (18 Mar 2022 04:34) (99 - 118)  BP: 148/91 (18 Mar 2022 04:34) (102/73 - 148/91)  BP(mean): --  RR: 18 (18 Mar 2022 04:34) (17 - 22)  SpO2: 95% (18 Mar 2022 04:34) (94% - 99%)  Daily Height in cm: 182.88 (18 Mar 2022 00:12)    Daily     LABS:                        11.4   8.56  )-----------( 413      ( 17 Mar 2022 13:30 )             36.2     Mean Cell Volume: 88.7 fL (- @ 13:30)        140  |  101  |  13  ----------------------------<  104<H>  4.1   |  26  |  0.82    Ca    9.7      17 Mar 2022 13:30    TPro  7.3  /  Alb  3.6  /  TBili  0.3  /  DBili  x   /  AST  16  /  ALT  10  /  AlkPhos  83      LIVER FUNCTIONS - ( 17 Mar 2022 13:30 )  Alb: 3.6 g/dL / Pro: 7.3 g/dL / ALK PHOS: 83 U/L / ALT: 10 U/L / AST: 16 U/L / GGT: x           PT/INR - ( 17 Mar 2022 13:30 )   PT: 14.7 sec;   INR: 1.26 ratio         PTT - ( 17 Mar 2022 13:30 )  PTT:31.4 sec  Urinalysis Basic - ( 17 Mar 2022 14:48 )    Color: Yellow / Appearance: Clear / S.021 / pH: x  Gluc: x / Ketone: Negative  / Bili: Negative / Urobili: <2 mg/dL   Blood: x / Protein: Trace / Nitrite: Negative   Leuk Esterase: Negative / RBC: 1 /HPF / WBC 1 /HPF   Sq Epi: x / Non Sq Epi: 0 /HPF / Bacteria: Negative                              11.4   8.56  )-----------( 413      ( 17 Mar 2022 13:30 )             36.2       Imaging:  CT A/P 3/17/22  FINDINGS:  LOWER CHEST: Linear atelectasis in the right middle lobe and lingula.    LIVER: Within normal limits.  BILE DUCTS: Normal caliber.  GALLBLADDER: Within normal limits.  SPLEEN: Within normal limits.  PANCREAS: Within normal limits.  ADRENALS: Within normal limits.  KIDNEYS/URETERS: No hydronephrosis. Bilateral cysts. Additional   subcentimeter hypodense foci are too small to characterize.    BLADDER: Within normal limits.  REPRODUCTIVE ORGANS: Prostate gland is within normal limits.    BOWEL: No bowel obstruction. Appendix is within normal limits. A tiny   focus of gas is again noted in the left intersphincteric space (2:133)   with associated edema. An appearance concerning for residual tract or   collection is noted, best seen on sagittal imaging (602:88), measuring   approximately 3.2 x 1.7 cm.  PERITONEUM: No ascites.  VESSELS: Atherosclerotic changes. IVC filter. Replaced right hepatic   artery arising from the superior mesenteric artery.  RETROPERITONEUM/LYMPH NODES: No lymphadenopathy.  ABDOMINAL WALL: Within normal limits.  BONES: Degenerative changes.    IMPRESSION:  Limited evaluation for residual sinus tract. A small droplet of gas is   noted in the left intersphincteric space with associated edema, similar   in appearance to prior CT dated 2022. There is an appearance   concerning for residual tract or collection as described above. MRI is   recommended to better delineate possible fistulae.

## 2022-03-18 NOTE — PHYSICAL THERAPY INITIAL EVALUATION ADULT - PERTINENT HX OF CURRENT PROBLEM, REHAB EVAL
57 y/o M with PMH anal cancer (dx 4/2021) s/p radiation and chemo (last 8/2021) complicated by an ulcer, prostate cancer (dx 2016) s/p radiation (no longer requiring tx), HIV on Biktarvy, HTN, diverticulosis w/ h/o diverticulitis, anxiety, depression, and chronic constipation presents with uncontrollable rectal pain with bleeding, subjective fevers, chills, diaphoresis, and difficulty ambulating.

## 2022-03-18 NOTE — CONSULT NOTE ADULT - PROBLEM SELECTOR RECOMMENDATION 3
Patient with increasing difficulty walking. He requires assistance with ADLs  > Please assist with ADLs  > fall precautions

## 2022-03-18 NOTE — PROGRESS NOTE ADULT - PROBLEM SELECTOR PLAN 9
BPs soft upon arrival to ED. Will continue metoprolol 50 given tachycardia and hold HCTZ and losartan.

## 2022-03-18 NOTE — PROGRESS NOTE ADULT - SUBJECTIVE AND OBJECTIVE BOX
*************************************  Yeison Johnson M.D.| PGY-1  Department of Internal Medicine  *************************************      Patient is a 56y old  Male who presents with a chief complaint of rectal pain/difficulty ambulating (17 Mar 2022 23:24)      SUBJECTIVE / OVERNIGHT EVENTS:    MEDICATIONS  (STANDING):  atorvastatin 10 milliGRAM(s) Oral at bedtime  bictegravir 50 mG/emtricitabine 200 mG/tenofovir alafenamide 25 mG (BIKTARVY) 1 Tablet(s) Oral daily  gabapentin 200 milliGRAM(s) Oral <User Schedule>  gabapentin 300 milliGRAM(s) Oral at bedtime  HYDROmorphone   Tablet 8 milliGRAM(s) Oral every 4 hours  HYDROmorphone  Injectable 0.5 milliGRAM(s) IV Push once  lactulose Syrup 10 Gram(s) Oral four times a day  metoprolol succinate ER 50 milliGRAM(s) Oral daily  morphine ER Tablet 60 milliGRAM(s) Oral every 8 hours  OLANZapine 10 milliGRAM(s) Oral at bedtime  piperacillin/tazobactam IVPB.. 3.375 Gram(s) IV Intermittent every 8 hours  polyethylene glycol 3350 17 Gram(s) Oral two times a day  senna 2 Tablet(s) Oral two times a day    MEDICATIONS  (PRN):  acetaminophen     Tablet .. 650 milliGRAM(s) Oral every 6 hours PRN Temp greater or equal to 38C (100.4F), Mild Pain (1 - 3)  ALPRAZolam 1 milliGRAM(s) Oral daily PRN anxiety  zolpidem 5 milliGRAM(s) Oral at bedtime PRN Insomnia  zolpidem 5 milliGRAM(s) Oral at bedtime PRN Insomnia      CAPILLARY BLOOD GLUCOSE      POCT Blood Glucose.: 115 mg/dL (17 Mar 2022 23:30)  POCT Blood Glucose.: 108 mg/dL (17 Mar 2022 12:35)    I&O's Summary      Vital Signs Last 24 Hrs  T(C): 37.3 (18 Mar 2022 04:34), Max: 37.8 (17 Mar 2022 13:00)  T(F): 99.1 (18 Mar 2022 04:34), Max: 100 (17 Mar 2022 13:00)  HR: 118 (18 Mar 2022 04:34) (99 - 118)  BP: 148/91 (18 Mar 2022 04:34) (102/73 - 148/91)  BP(mean): --  RR: 18 (18 Mar 2022 04:34) (17 - 22)  SpO2: 95% (18 Mar 2022 04:34) (94% - 99%)    PHYSICAL EXAM:  GENERAL: NAD, well-developed, well-nourished  HEAD: Atraumatic, Normocephalic  EYES: EOMI, PERRLA, conjunctiva and sclera clear  NECK: Supple, No JVD  CHEST/LUNG: Clear to auscultation bilaterally; No wheezes or crackles  HEART: Normal S1/S2; Regular rate and rhythm; No murmurs, rubs, or gallops  ABDOMEN: Soft, Nontender, Nondistended; Bowel sounds present  EXTREMITIES: 2+ Peripheral Pulses; No clubbing, cyanosis, or edema  PSYCH: A&Ox3  NEUROLOGY: no focal neurologic deficit  SKIN: No rashes or lesions    LABS:                        11.4   8.56  )-----------( 413      ( 17 Mar 2022 13:30 )             36.2      03-17    140  |  101  |  13  ----------------------------<  104<H>  4.1   |  26  |  0.82    Ca    9.7      17 Mar 2022 13:30    TPro  7.3  /  Alb  3.6  /  TBili  0.3  /  DBili  x   /  AST  16  /  ALT  10  /  AlkPhos  83  03-17    PT/INR - ( 17 Mar 2022 13:30 )   PT: 14.7 sec;   INR: 1.26 ratio         PTT - ( 17 Mar 2022 13:30 )  PTT:31.4 sec      Urinalysis Basic - ( 17 Mar 2022 14:48 )    Color: Yellow / Appearance: Clear / S.021 / pH: x  Gluc: x / Ketone: Negative  / Bili: Negative / Urobili: <2 mg/dL   Blood: x / Protein: Trace / Nitrite: Negative   Leuk Esterase: Negative / RBC: 1 /HPF / WBC 1 /HPF   Sq Epi: x / Non Sq Epi: 0 /HPF / Bacteria: Negative        RADIOLOGY & ADDITIONAL TESTS:    Imaging Personally Reviewed:    Consultant(s) Notes Reviewed:      Care Discussed with Consultants/Other Providers:   09/25/19 1337 Sheets,Kenia
1326 PT ARRIVED TO PACU ON 10L VIA MASK, PT SHACKING. PT REACTIVE TO
TACTILE STIMULI.
 
1328 O2 REMOVED AND PT REPORTS BEING COLD, WARM BLACKETS GIVEN. PT
ALSO REPORT PAIN 3-4/10 AND TOLERABLE.
 
1336 PT RESTING IN BED AND REPORTS FEELING WARMER. PT AWAKE OFF AND
ON. PLAN OF CARE DISCUSSED. *************************************  Yeison Johnson M.D.| PGY-1  Department of Internal Medicine  *************************************      Patient is a 56y old  Male who presents with a chief complaint of rectal pain/difficulty ambulating (17 Mar 2022 23:24)      SUBJECTIVE / OVERNIGHT EVENTS: Patient feels much better than he has. He states that he has no pain at rest right now. Endoses no BM in 4 days, typically his BMs are with blood adn there is blood on toilet paper.         MEDICATIONS  (STANDING):  atorvastatin 10 milliGRAM(s) Oral at bedtime  bictegravir 50 mG/emtricitabine 200 mG/tenofovir alafenamide 25 mG (BIKTARVY) 1 Tablet(s) Oral daily  gabapentin 200 milliGRAM(s) Oral <User Schedule>  gabapentin 300 milliGRAM(s) Oral at bedtime  HYDROmorphone   Tablet 8 milliGRAM(s) Oral every 4 hours  HYDROmorphone  Injectable 0.5 milliGRAM(s) IV Push once  lactulose Syrup 10 Gram(s) Oral four times a day  metoprolol succinate ER 50 milliGRAM(s) Oral daily  morphine ER Tablet 60 milliGRAM(s) Oral every 8 hours  OLANZapine 10 milliGRAM(s) Oral at bedtime  piperacillin/tazobactam IVPB.. 3.375 Gram(s) IV Intermittent every 8 hours  polyethylene glycol 3350 17 Gram(s) Oral two times a day  senna 2 Tablet(s) Oral two times a day    MEDICATIONS  (PRN):  acetaminophen     Tablet .. 650 milliGRAM(s) Oral every 6 hours PRN Temp greater or equal to 38C (100.4F), Mild Pain (1 - 3)  ALPRAZolam 1 milliGRAM(s) Oral daily PRN anxiety  zolpidem 5 milliGRAM(s) Oral at bedtime PRN Insomnia  zolpidem 5 milliGRAM(s) Oral at bedtime PRN Insomnia      CAPILLARY BLOOD GLUCOSE      POCT Blood Glucose.: 115 mg/dL (17 Mar 2022 23:30)  POCT Blood Glucose.: 108 mg/dL (17 Mar 2022 12:35)    I&O's Summary      Vital Signs Last 24 Hrs  T(C): 37.3 (18 Mar 2022 04:34), Max: 37.8 (17 Mar 2022 13:00)  T(F): 99.1 (18 Mar 2022 04:34), Max: 100 (17 Mar 2022 13:00)  HR: 118 (18 Mar 2022 04:34) (99 - 118)  BP: 148/91 (18 Mar 2022 04:34) (102/73 - 148/91)  BP(mean): --  RR: 18 (18 Mar 2022 04:34) (17 - 22)  SpO2: 95% (18 Mar 2022 04:34) (94% - 99%)    PHYSICAL EXAM:  GENERAL: NAD, well-developed, well-nourished  HEAD: Atraumatic, Normocephalic  EYES: EOMI, PERRLA, conjunctiva and sclera clear  NECK: Supple, No JVD  CHEST/LUNG: Clear to auscultation bilaterally; No wheezes or crackles  HEART: Normal S1/S2; Regular rate and rhythm; No murmurs, rubs, or gallops  ABDOMEN: Soft, Nontender, Nondistended; Bowel sounds present  EXTREMITIES: 2+ Peripheral Pulses; No clubbing, cyanosis, or edema  PSYCH: A&Ox3  NEUROLOGY: no focal neurologic deficit  SKIN: No rashes or lesions  rectal Deferred,      LABS:                        11.4   8.56  )-----------( 413      ( 17 Mar 2022 13:30 )             36.2      03-17    140  |  101  |  13  ----------------------------<  104<H>  4.1   |  26  |  0.82    Ca    9.7      17 Mar 2022 13:30    TPro  7.3  /  Alb  3.6  /  TBili  0.3  /  DBili  x   /  AST  16  /  ALT  10  /  AlkPhos  83  03-17    PT/INR - ( 17 Mar 2022 13:30 )   PT: 14.7 sec;   INR: 1.26 ratio         PTT - ( 17 Mar 2022 13:30 )  PTT:31.4 sec      Urinalysis Basic - ( 17 Mar 2022 14:48 )    Color: Yellow / Appearance: Clear / S.021 / pH: x  Gluc: x / Ketone: Negative  / Bili: Negative / Urobili: <2 mg/dL   Blood: x / Protein: Trace / Nitrite: Negative   Leuk Esterase: Negative / RBC: 1 /HPF / WBC 1 /HPF   Sq Epi: x / Non Sq Epi: 0 /HPF / Bacteria: Negative        RADIOLOGY & ADDITIONAL TESTS:    Imaging Personally Reviewed:    Consultant(s) Notes Reviewed:      Care Discussed with Consultants/Other Providers:

## 2022-03-18 NOTE — PROGRESS NOTE ADULT - PROBLEM SELECTOR PLAN 1
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 MRI for better evaluation of fistula  -GI consult (emailed) for possible endoscopic management of radiation proctitis  -Palliative consult for pain management

## 2022-03-18 NOTE — CONSULT NOTE ADULT - PROBLEM SELECTOR RECOMMENDATION 5
PMH anal cancer (dx 4/2021) s/p radiation and chemo (last 8/2021) complicated by an ulcer  > Pt completed radiation and chemo (8/2021) at [facility]

## 2022-03-18 NOTE — CONSULT NOTE ADULT - PROBLEM SELECTOR RECOMMENDATION 9
Patient follows with outpatient palliative team: Dr. Colon and Neena Borrego at [facility].   > At home, patient on MS Contin 60mg q8hrs and PO dilaudid 4-8mg q4h prn. He was receiving hyperbaric oxygen treatments outpatient.   > Continue MS Contin 60mg tid   > Will liberalize frequency of IV dilaudid 0.5mg q3h prn moderate pain and IV dilaudid 1mg q3h prn severe pain. PLEASE ENSURE PATIENT IS PREMEDICATED PRIOR TO IMAGING.   > Patient's gabapentin was adjusted on 3/15 to 200mg/200mg/300mg. If neuropathic pain remains uncontrolled, can consider increase to 300mg tid possibly on 3/21.  > Pt may benefit from nerve block as outpatient for improved symptom control but he was receiving HBOT.

## 2022-03-18 NOTE — PROGRESS NOTE ADULT - PROBLEM SELECTOR PLAN 4
-likely ACD from malignancy vs KAREN, however patient does have blood in toilet after bowel movements and blood on toilet paper  -Hgb 11.4 from 12.9 in 1/22, 13.8 in 10/21  -possible slow bleed from radiation proctitis    Plan: iron studies in AM, GI consult -likely ACD from malignancy vs KAREN, however patient does have blood in toilet after bowel movements and blood on toilet paper  -Hgb 11.4 from 12.9 in 1/22, 13.8 in 10/21  -possible slow bleed from radiation proctitis    Plan:  F/u Iron Studies

## 2022-03-19 LAB
A1C WITH ESTIMATED AVERAGE GLUCOSE RESULT: 5.8 % — HIGH (ref 4–5.6)
ALBUMIN SERPL ELPH-MCNC: 3.3 G/DL — SIGNIFICANT CHANGE UP (ref 3.3–5)
ALP SERPL-CCNC: 69 U/L — SIGNIFICANT CHANGE UP (ref 40–120)
ALT FLD-CCNC: 11 U/L — SIGNIFICANT CHANGE UP (ref 4–41)
ANION GAP SERPL CALC-SCNC: 9 MMOL/L — SIGNIFICANT CHANGE UP (ref 7–14)
AST SERPL-CCNC: 14 U/L — SIGNIFICANT CHANGE UP (ref 4–40)
BASOPHILS # BLD AUTO: 0.03 K/UL — SIGNIFICANT CHANGE UP (ref 0–0.2)
BASOPHILS NFR BLD AUTO: 0.4 % — SIGNIFICANT CHANGE UP (ref 0–2)
BILIRUB SERPL-MCNC: 0.3 MG/DL — SIGNIFICANT CHANGE UP (ref 0.2–1.2)
BUN SERPL-MCNC: 8 MG/DL — SIGNIFICANT CHANGE UP (ref 7–23)
CALCIUM SERPL-MCNC: 9.2 MG/DL — SIGNIFICANT CHANGE UP (ref 8.4–10.5)
CHLORIDE SERPL-SCNC: 103 MMOL/L — SIGNIFICANT CHANGE UP (ref 98–107)
CO2 SERPL-SCNC: 27 MMOL/L — SIGNIFICANT CHANGE UP (ref 22–31)
CREAT SERPL-MCNC: 0.82 MG/DL — SIGNIFICANT CHANGE UP (ref 0.5–1.3)
EGFR: 104 ML/MIN/1.73M2 — SIGNIFICANT CHANGE UP
EOSINOPHIL # BLD AUTO: 0.02 K/UL — SIGNIFICANT CHANGE UP (ref 0–0.5)
EOSINOPHIL NFR BLD AUTO: 0.3 % — SIGNIFICANT CHANGE UP (ref 0–6)
ESTIMATED AVERAGE GLUCOSE: 120 — SIGNIFICANT CHANGE UP
FERRITIN SERPL-MCNC: 480 NG/ML — HIGH (ref 30–400)
FOLATE SERPL-MCNC: 20 NG/ML — HIGH (ref 3.1–17.5)
GLUCOSE SERPL-MCNC: 117 MG/DL — HIGH (ref 70–99)
HCT VFR BLD CALC: 31.5 % — LOW (ref 39–50)
HGB BLD-MCNC: 10.6 G/DL — LOW (ref 13–17)
IANC: 4.95 K/UL — SIGNIFICANT CHANGE UP (ref 1.5–8.5)
IMM GRANULOCYTES NFR BLD AUTO: 0.4 % — SIGNIFICANT CHANGE UP (ref 0–1.5)
IRON SATN MFR SERPL: 17 % — SIGNIFICANT CHANGE UP (ref 14–50)
IRON SATN MFR SERPL: 30 UG/DL — LOW (ref 45–165)
LYMPHOCYTES # BLD AUTO: 1.35 K/UL — SIGNIFICANT CHANGE UP (ref 1–3.3)
LYMPHOCYTES # BLD AUTO: 18.2 % — SIGNIFICANT CHANGE UP (ref 13–44)
MAGNESIUM SERPL-MCNC: 2.3 MG/DL — SIGNIFICANT CHANGE UP (ref 1.6–2.6)
MCHC RBC-ENTMCNC: 28.7 PG — SIGNIFICANT CHANGE UP (ref 27–34)
MCHC RBC-ENTMCNC: 33.7 GM/DL — SIGNIFICANT CHANGE UP (ref 32–36)
MCV RBC AUTO: 85.4 FL — SIGNIFICANT CHANGE UP (ref 80–100)
MONOCYTES # BLD AUTO: 1.02 K/UL — HIGH (ref 0–0.9)
MONOCYTES NFR BLD AUTO: 13.8 % — SIGNIFICANT CHANGE UP (ref 2–14)
NEUTROPHILS # BLD AUTO: 4.95 K/UL — SIGNIFICANT CHANGE UP (ref 1.8–7.4)
NEUTROPHILS NFR BLD AUTO: 66.9 % — SIGNIFICANT CHANGE UP (ref 43–77)
NRBC # BLD: 0 /100 WBCS — SIGNIFICANT CHANGE UP
NRBC # FLD: 0 K/UL — SIGNIFICANT CHANGE UP
PHOSPHATE SERPL-MCNC: 4.1 MG/DL — SIGNIFICANT CHANGE UP (ref 2.5–4.5)
PLATELET # BLD AUTO: 393 K/UL — SIGNIFICANT CHANGE UP (ref 150–400)
POTASSIUM SERPL-MCNC: 4.2 MMOL/L — SIGNIFICANT CHANGE UP (ref 3.5–5.3)
POTASSIUM SERPL-SCNC: 4.2 MMOL/L — SIGNIFICANT CHANGE UP (ref 3.5–5.3)
PROT SERPL-MCNC: 7 G/DL — SIGNIFICANT CHANGE UP (ref 6–8.3)
RBC # BLD: 3.69 M/UL — LOW (ref 4.2–5.8)
RBC # FLD: 13.2 % — SIGNIFICANT CHANGE UP (ref 10.3–14.5)
SODIUM SERPL-SCNC: 139 MMOL/L — SIGNIFICANT CHANGE UP (ref 135–145)
TIBC SERPL-MCNC: 175 UG/DL — LOW (ref 220–430)
TRANSFERRIN SERPL-MCNC: 156 MG/DL — LOW (ref 200–360)
UIBC SERPL-MCNC: 145 UG/DL — SIGNIFICANT CHANGE UP (ref 110–370)
VANCOMYCIN TROUGH SERPL-MCNC: 13.1 UG/ML — SIGNIFICANT CHANGE UP (ref 10–20)
VIT B12 SERPL-MCNC: 461 PG/ML — SIGNIFICANT CHANGE UP (ref 200–900)
WBC # BLD: 7.4 K/UL — SIGNIFICANT CHANGE UP (ref 3.8–10.5)
WBC # FLD AUTO: 7.4 K/UL — SIGNIFICANT CHANGE UP (ref 3.8–10.5)

## 2022-03-19 PROCEDURE — 99223 1ST HOSP IP/OBS HIGH 75: CPT

## 2022-03-19 PROCEDURE — 99222 1ST HOSP IP/OBS MODERATE 55: CPT

## 2022-03-19 PROCEDURE — 99233 SBSQ HOSP IP/OBS HIGH 50: CPT | Mod: GC

## 2022-03-19 RX ORDER — CHLORHEXIDINE GLUCONATE 213 G/1000ML
1 SOLUTION TOPICAL DAILY
Refills: 0 | Status: DISCONTINUED | OUTPATIENT
Start: 2022-03-19 | End: 2022-04-02

## 2022-03-19 RX ADMIN — HYDROMORPHONE HYDROCHLORIDE 1 MILLIGRAM(S): 2 INJECTION INTRAMUSCULAR; INTRAVENOUS; SUBCUTANEOUS at 10:13

## 2022-03-19 RX ADMIN — MORPHINE SULFATE 60 MILLIGRAM(S): 50 CAPSULE, EXTENDED RELEASE ORAL at 13:02

## 2022-03-19 RX ADMIN — ATORVASTATIN CALCIUM 10 MILLIGRAM(S): 80 TABLET, FILM COATED ORAL at 21:29

## 2022-03-19 RX ADMIN — GABAPENTIN 200 MILLIGRAM(S): 400 CAPSULE ORAL at 12:01

## 2022-03-19 RX ADMIN — MORPHINE SULFATE 60 MILLIGRAM(S): 50 CAPSULE, EXTENDED RELEASE ORAL at 06:24

## 2022-03-19 RX ADMIN — HYDROMORPHONE HYDROCHLORIDE 1 MILLIGRAM(S): 2 INJECTION INTRAMUSCULAR; INTRAVENOUS; SUBCUTANEOUS at 06:24

## 2022-03-19 RX ADMIN — HYDROMORPHONE HYDROCHLORIDE 1 MILLIGRAM(S): 2 INJECTION INTRAMUSCULAR; INTRAVENOUS; SUBCUTANEOUS at 10:30

## 2022-03-19 RX ADMIN — SENNA PLUS 2 TABLET(S): 8.6 TABLET ORAL at 05:59

## 2022-03-19 RX ADMIN — Medication 1 PACKET(S): at 05:59

## 2022-03-19 RX ADMIN — POLYETHYLENE GLYCOL 3350 17 GRAM(S): 17 POWDER, FOR SOLUTION ORAL at 06:00

## 2022-03-19 RX ADMIN — BICTEGRAVIR SODIUM, EMTRICITABINE, AND TENOFOVIR ALAFENAMIDE FUMARATE 1 TABLET(S): 30; 120; 15 TABLET ORAL at 21:29

## 2022-03-19 RX ADMIN — HYDROMORPHONE HYDROCHLORIDE 1 MILLIGRAM(S): 2 INJECTION INTRAMUSCULAR; INTRAVENOUS; SUBCUTANEOUS at 21:38

## 2022-03-19 RX ADMIN — MORPHINE SULFATE 60 MILLIGRAM(S): 50 CAPSULE, EXTENDED RELEASE ORAL at 13:03

## 2022-03-19 RX ADMIN — HYDROMORPHONE HYDROCHLORIDE 1 MILLIGRAM(S): 2 INJECTION INTRAMUSCULAR; INTRAVENOUS; SUBCUTANEOUS at 16:20

## 2022-03-19 RX ADMIN — PIPERACILLIN AND TAZOBACTAM 25 GRAM(S): 4; .5 INJECTION, POWDER, LYOPHILIZED, FOR SOLUTION INTRAVENOUS at 16:03

## 2022-03-19 RX ADMIN — HYDROMORPHONE HYDROCHLORIDE 1 MILLIGRAM(S): 2 INJECTION INTRAMUSCULAR; INTRAVENOUS; SUBCUTANEOUS at 22:38

## 2022-03-19 RX ADMIN — Medication 300 MILLIGRAM(S): at 06:24

## 2022-03-19 RX ADMIN — OLANZAPINE 10 MILLIGRAM(S): 15 TABLET, FILM COATED ORAL at 21:29

## 2022-03-19 RX ADMIN — Medication 50 MILLIGRAM(S): at 05:59

## 2022-03-19 RX ADMIN — PIPERACILLIN AND TAZOBACTAM 25 GRAM(S): 4; .5 INJECTION, POWDER, LYOPHILIZED, FOR SOLUTION INTRAVENOUS at 00:03

## 2022-03-19 RX ADMIN — PIPERACILLIN AND TAZOBACTAM 25 GRAM(S): 4; .5 INJECTION, POWDER, LYOPHILIZED, FOR SOLUTION INTRAVENOUS at 08:40

## 2022-03-19 RX ADMIN — HYDROMORPHONE HYDROCHLORIDE 1 MILLIGRAM(S): 2 INJECTION INTRAMUSCULAR; INTRAVENOUS; SUBCUTANEOUS at 07:24

## 2022-03-19 RX ADMIN — MORPHINE SULFATE 60 MILLIGRAM(S): 50 CAPSULE, EXTENDED RELEASE ORAL at 21:38

## 2022-03-19 RX ADMIN — CHLORHEXIDINE GLUCONATE 1 APPLICATION(S): 213 SOLUTION TOPICAL at 06:35

## 2022-03-19 RX ADMIN — HYDROMORPHONE HYDROCHLORIDE 1 MILLIGRAM(S): 2 INJECTION INTRAMUSCULAR; INTRAVENOUS; SUBCUTANEOUS at 16:03

## 2022-03-19 RX ADMIN — GABAPENTIN 300 MILLIGRAM(S): 400 CAPSULE ORAL at 21:29

## 2022-03-19 RX ADMIN — ZOLPIDEM TARTRATE 5 MILLIGRAM(S): 10 TABLET ORAL at 21:58

## 2022-03-19 RX ADMIN — GABAPENTIN 200 MILLIGRAM(S): 400 CAPSULE ORAL at 08:40

## 2022-03-19 RX ADMIN — LACTULOSE 10 GRAM(S): 10 SOLUTION ORAL at 05:59

## 2022-03-19 NOTE — PROGRESS NOTE ADULT - PROBLEM SELECTOR PLAN 4
-likely ACD from malignancy vs KAREN, however patient does have blood in toilet after bowel movements and blood on toilet paper  -Hgb 11.4 from 12.9 in 1/22, 13.8 in 10/21  -likely 2/2 to AOCD    Plan:  Trend Hb, transfuse for <7

## 2022-03-19 NOTE — PROGRESS NOTE ADULT - PROBLEM SELECTOR PLAN 3
-acute on chronic gait instability over the past 3 days  -neuro exam benign, 5/5 in UE b/l, 5/5 strength in RLE, and 4/5 strength in LLE which he states is his baseline  -usually ambulates without assistance, now is using cane  -more likely 2/2 anal pain radiating down legs, less likely due to other causes such as new mets    Plan: PT aliana, monitor pending pain control,

## 2022-03-19 NOTE — PROGRESS NOTE ADULT - PROBLEM SELECTOR PLAN 5
-patient compliant with biktarvy, will continue inpatient. CD4 count 947 in 5/21, now 239  - ID consulted, appreciate recs

## 2022-03-19 NOTE — PROGRESS NOTE ADULT - SUBJECTIVE AND OBJECTIVE BOX
Patient well known to me . Initially had rectal biopsy with diagnosis of squamous cell CA  Treated with Lorenzo  protocol. Subsequently developed pelvic/perianal pain for which he underwent EUA and biopsy of left sided  rectal ulcer at anal verge. There was no evidence of recurrent tumor or transsphinteric fistula . Patient continues to be treated for chronic pain and bleeding . Ct on this admission was reviewed with findings of inflammation/Fistula?. Awaiting MRI for further clarification. May consider repeat tEUA. DHeld

## 2022-03-19 NOTE — PROGRESS NOTE ADULT - PROBLEM SELECTOR PLAN 2
-when admitted, patient met SIRS criteria w/ tachycardia and tachypnea  -subjective fevers and chills at home, however afebrile in ED and per patient all measured temps at home <100  -no infectious symptoms  -received 1 dose vanc/zosyn in ED    Plan: given HIV and possible fistula would continue empiric antibiotics pending culture results with zosyn    [ ] assuming no new signs of infection, will DC abx after cultures come back negative

## 2022-03-19 NOTE — CONSULT NOTE ADULT - SUBJECTIVE AND OBJECTIVE BOX
Patient is a 56 year old male who presents with a chief complaint of rectal pain and bleeding, difficulty ambulating (19 Mar 2022 11:29)    HPI:  Patient is a 56 year old male with PMH anal cancer (dx 2021) s/p radiation and chemotherapy (last 2021) complicated by an ulcer, prostate cancer (dx 2016) s/p radiation (no longer requiring tx), HIV on Biktarvy, HTN, diverticulosis w/ h/o diverticulitis, anxiety, depression, and chronic constipation who presents with uncontrollable rectal pain with bleeding, subjective fevers, chills, diaphoresis, and difficulty ambulating. Patient was told he is cancer free after last radiation therapy in 2021. Since then, though, he has been having increased rectal pain uncontrolled by pain regimen. Pain has progressively worsened over the past number of months and is now associated with new-onset difficulty ambulating. At baseline, patient walks "slowly" on his own without assistance from his house to the car. Needs assistance from wife with everyday activities such as dressing himself. Over the past few days, patient has required use of cane to ambulate. Patient endorses one episode of significant bowel incontinence about 3 weeks ago and multiple small episodes since then. Denies any urinary incontinence. Patient consistently has blood in the toilet bowl, no clots, and blood on toilet paper after bowel movements. Over the past 3 days, patient has had subjective fevers, however has taken multiple measurements of temp and highest has been 99-100F. Additionally patient has been having chills and diaphoresis. He denies chest pain, SOB, cough, N/V, headache, rash, numbness including saddle anesthesia, dysuria, or hematuria.     Labs were unremarkable. Procalcitonin was normal. CD4 count was 239. UA negative. BCx x 2 showed NGTD. UCx negative. RVP negative. MRSA PCR negative. HIV viral load in process. CT abdomen/pelvis w/ PO contrast showed a residual sinus tract w/ a small droplet of gas is noted in the left intersphincteric space with associated edema, similar in appearance to prior CT dated 2022. There is an appearance concerning for residual tract or collection as described above. He was started on oral biktarvy, IV vancomycin, and IV zosyn. MRI of the L-spine and abdomen/pelvis w/ IV contrast is ordered.     prior hospital charts reviewed [  ]  primary team notes reviewed [  ]  other consultant notes reviewed [  ]    PAST MEDICAL & SURGICAL HISTORY:  HTN (hypertension)    HLD (hyperlipidemia)    HIV infection    Depressive disorder    Anxiety    Prostate cancer    Anal cancer    Diverticulosis    Anal lesion        Allergies  No Known Allergies    ANTIMICROBIALS (past 90 days)  MEDICATIONS  (STANDING):  bictegravir 50 mG/emtricitabine 200 mG/tenofovir alafenamide 25 mG (BIKTARVY)   1 Tablet(s) Oral (22 @ 14:08)    piperacillin/tazobactam IVPB..   25 mL/Hr IV Intermittent (22 @ 08:40)   25 mL/Hr IV Intermittent (22 @ 00:03)   25 mL/Hr IV Intermittent (22 @ 15:05)   25 mL/Hr IV Intermittent (22 @ 04:11)    piperacillin/tazobactam IVPB...   200 mL/Hr IV Intermittent (22 @ 13:43)    vancomycin  IVPB   300 mL/Hr IV Intermittent (22 @ 12:39)    vancomycin  IVPB   300 mL/Hr IV Intermittent (22 @ 06:24)   300 mL/Hr IV Intermittent (22 @ 21:55)    vancomycin  IVPB.   250 mL/Hr IV Intermittent (22 @ 15:01)        bictegravir 50 mG/emtricitabine 200 mG/tenofovir alafenamide 25 mG (BIKTARVY) 1 daily  piperacillin/tazobactam IVPB.. 3.375 every 8 hours  vancomycin  IVPB    vancomycin  IVPB 1500 every 12 hours    OTHER MEDS: MEDICATIONS  (STANDING):  acetaminophen     Tablet .. 650 every 6 hours PRN  ALPRAZolam 1 daily PRN  atorvastatin 10 at bedtime  gabapentin 200 <User Schedule>  gabapentin 300 at bedtime  HYDROmorphone  Injectable 1 every 3 hours PRN  HYDROmorphone  Injectable 0.5 every 3 hours PRN  lactulose Syrup 10 four times a day  metoprolol succinate ER 50 daily  morphine ER Tablet 60 every 8 hours  OLANZapine 10 at bedtime  polyethylene glycol 3350 17 two times a day  psyllium Powder 1 two times a day  senna 2 two times a day  zolpidem 5 at bedtime PRN  zolpidem 5 at bedtime PRN    SOCIAL HISTORY:       FAMILY HISTORY:  No pertinent family history in first degree relatives      REVIEW OF SYSTEMS  [  ] ROS unobtainable because:    [  ] All other systems negative except as noted below:	    Constitutional:  [ ] fever [ ] chills  [ ] weight loss  [ ] weakness  Skin:  [ ] rash [ ] phlebitis	  Eyes: [ ] icterus [ ] pain  [ ] discharge	  ENMT: [ ] sore throat  [ ] thrush [ ] ulcers [ ] exudates  Respiratory: [ ] dyspnea [ ] hemoptysis [ ] cough [ ] sputum	  Cardiovascular:  [ ] chest pain [ ] palpitations [ ] edema	  Gastrointestinal:  [ ] nausea [ ] vomiting [ ] diarrhea [ ] constipation [ ] pain	  Genitourinary:  [ ] dysuria [ ] frequency [ ] hematuria [ ] discharge [ ] flank pain  [ ] incontinence  Musculoskeletal:  [ ] myalgias [ ] arthralgias [ ] arthritis  [ ] back pain  Neurological:  [ ] headache [ ] seizures  [ ] confusion/altered mental status  Psychiatric:  [ ] anxiety [ ] depression	  Hematology/Lymphatics:  [ ] lymphadenopathy  Endocrine:  [ ] adrenal [ ] thyroid  Allergic/Immunologic:	 [ ] transplant [ ] seasonal    Vital Signs Last 24 Hrs  T(F): 98.4 (22 @ 05:50), Max: 100 (22 @ 13:00)  Vital Signs Last 24 Hrs  HR: 108 (22 @ 05:50) (96 - 108)  BP: 125/92 (22 @ 05:50) (125/87 - 131/90)  RR: 17 (22 @ 05:50)  SpO2: 97% (22 @ 05:50) (95% - 99%)  Wt(kg): --    PHYSICAL EXAM:  Constitutional: non-toxic, no distress  HEAD/EYES: anicteric, no conjunctival injection  ENT:  supple, no thrush  Cardiovascular:   normal S1, S2, no murmur, no edema  Respiratory:  clear BS bilaterally, no wheezes, no rales  GI:  soft, non-tender, normal bowel sounds  :  no davis, no CVA tenderness  Musculoskeletal:  no synovitis, normal ROM  Neurologic: awake and alert, normal strength, no focal findings  Skin:  no rash, no erythema, no phlebitis  Heme/Onc: no lymphadenopathy   Psychiatric:  awake, alert, appropriate mood                            10.6   7.40  )-----------( 393      ( 19 Mar 2022 05:34 )             31.5       139  |  103  |  8   ----------------------------<  117<H>  4.2   |  27  |  0.82    Ca    9.2      19 Mar 2022 05:34  Phos  4.1       Mg     2.30         TPro  7.0  /  Alb  3.3  /  TBili  0.3  /  DBili  x   /  AST  14  /  ALT  11  /  AlkPhos  69      Urinalysis Basic - ( 17 Mar 2022 14:48 )    Color: Yellow / Appearance: Clear / S.021 / pH: x  Gluc: x / Ketone: Negative  / Bili: Negative / Urobili: <2 mg/dL   Blood: x / Protein: Trace / Nitrite: Negative   Leuk Esterase: Negative / RBC: 1 /HPF / WBC 1 /HPF   Sq Epi: x / Non Sq Epi: 0 /HPF / Bacteria: Negative    MICROBIOLOGY:Vancomycin Level, Trough: 13.1 ( @ 05:34)    Culture - Urine (collected 17 Mar 2022 14:38)  Source: Clean Catch Clean Catch (Midstream)  Final Report (18 Mar 2022 13:29):    No growth    Culture - Blood (collected 17 Mar 2022 14:37)  Source: .Blood Blood-Peripheral  Preliminary Report (18 Mar 2022 15:01):    No growth to date.    Culture - Blood (collected 17 Mar 2022 14:37)  Source: .Blood Blood-Peripheral  Preliminary Report (18 Mar 2022 15:01):    No growth to date.              Rapid RVP Result: NotDetec ( @ 13:34)      RADIOLOGY:  imaging below personally reviewed and agree with findings Patient is a 56 year old male who presents with a chief complaint of rectal pain and bleeding, difficulty ambulating (19 Mar 2022 11:29)    HPI:  Patient is a 56 year old male with PMH anal cancer (dx 2021) s/p radiation and chemotherapy (last 2021) complicated by an ulcer, prostate cancer (dx 2016) s/p radiation (no longer requiring tx), HIV on Biktarvy, HTN, diverticulosis w/ h/o diverticulitis, anxiety, depression, and chronic constipation who presents with uncontrollable rectal pain with bleeding, subjective fevers, chills, diaphoresis, and difficulty ambulating. Patient was told he is cancer free after last radiation therapy in 2021. Since then, though, he has been having increased rectal pain uncontrolled by pain regimen. Pain has progressively worsened over the past number of months and is now associated with new-onset difficulty ambulating. At baseline, patient walks "slowly" on his own without assistance from his house to the car. Needs assistance from wife with everyday activities such as dressing himself. Over the past few days, patient has required use of cane to ambulate. Patient endorses one episode of significant bowel incontinence about 3 weeks ago and multiple small episodes since then. Denies any urinary incontinence. Patient consistently has blood in the toilet bowl, no clots, and blood on toilet paper after bowel movements. Over the past 3 days, patient has had subjective fevers, however has taken multiple measurements of temp and highest has been 99-100F.  Additionally patient has been having chills and diaphoresis. He denies chest pain, SOB, cough, N/V, headache, rash, numbness including saddle anesthesia, dysuria, or hematuria. tmax in the ED was 100.     Labs were unremarkable. Procalcitonin was normal. CD4 count was 239 w/ last CD4 cell count in May 2021 947, UA negative. BCx x 2 showed NGTD. UCx negative. RVP negative. MRSA PCR negative. HIV viral load in process. CT abdomen/pelvis w/ PO contrast showed a residual sinus tract w/ a small droplet of gas is noted in the left intersphincteric space with associated edema, similar in appearance to prior CT dated 2022. There is an appearance concerning for residual tract or collection as described above. He was started on oral biktarvy, IV vancomycin, and IV zosyn. MRI of the L-spine and abdomen/pelvis w/ IV contrast is ordered. ID was consulted for HIV management.     prior hospital charts reviewed [x]  primary team notes reviewed [x]  other consultant notes reviewed [x]    PAST MEDICAL & SURGICAL HISTORY:  HTN (hypertension)  HLD (hyperlipidemia)  HIV infection  Depressive disorder  Anxiety  Prostate cancer  Anal cancer  Diverticulosis  Anal lesion        Allergies  No Known Allergies    ANTIMICROBIALS (past 90 days)  MEDICATIONS  (STANDING):  bictegravir 50 mG/emtricitabine 200 mG/tenofovir alafenamide 25 mG (BIKTARVY)   1 Tablet(s) Oral (22 @ 14:08)    piperacillin/tazobactam IVPB..   25 mL/Hr IV Intermittent (22 @ 08:40)   25 mL/Hr IV Intermittent (22 @ 00:03)   25 mL/Hr IV Intermittent (22 @ 15:05)   25 mL/Hr IV Intermittent (22 @ 04:11)    piperacillin/tazobactam IVPB...   200 mL/Hr IV Intermittent (22 @ 13:43)    vancomycin  IVPB   300 mL/Hr IV Intermittent (22 @ 12:39)    vancomycin  IVPB   300 mL/Hr IV Intermittent (22 @ 06:24)   300 mL/Hr IV Intermittent (22 @ 21:55)    vancomycin  IVPB.   250 mL/Hr IV Intermittent (22 @ 15:01)        bictegravir 50 mG/emtricitabine 200 mG/tenofovir alafenamide 25 mG (BIKTARVY) 1 daily  piperacillin/tazobactam IVPB.. 3.375 every 8 hours  vancomycin  IVPB    vancomycin  IVPB 1500 every 12 hours    OTHER MEDS: MEDICATIONS  (STANDING):  acetaminophen     Tablet .. 650 every 6 hours PRN  ALPRAZolam 1 daily PRN  atorvastatin 10 at bedtime  gabapentin 200 <User Schedule>  gabapentin 300 at bedtime  HYDROmorphone  Injectable 1 every 3 hours PRN  HYDROmorphone  Injectable 0.5 every 3 hours PRN  lactulose Syrup 10 four times a day  metoprolol succinate ER 50 daily  morphine ER Tablet 60 every 8 hours  OLANZapine 10 at bedtime  polyethylene glycol 3350 17 two times a day  psyllium Powder 1 two times a day  senna 2 two times a day  zolpidem 5 at bedtime PRN  zolpidem 5 at bedtime PRN    SOCIAL HISTORY:   does not smoke, drink alcohol, or use recreational drugs     FAMILY HISTORY:  No pertinent family history in first degree relatives      REVIEW OF SYSTEMS  [  ] ROS unobtainable because:    [x] All other systems negative except as noted below:	    Constitutional:  [ ] fever [x] chills  [ ] weight loss  [x] weakness  Skin:  [ ] rash [ ] phlebitis	  Eyes: [ ] icterus [ ] pain  [ ] discharge	  ENMT: [ ] sore throat  [ ] thrush [ ] ulcers [ ] exudates  Respiratory: [ ] dyspnea [ ] hemoptysis [ ] cough [ ] sputum	  Cardiovascular:  [ ] chest pain [ ] palpitations [ ] edema	  Gastrointestinal:  [ ] nausea [ ] vomiting [ ] diarrhea [ ] constipation [ ] pain [x] rectal bleeding 	  Genitourinary:  [ ] dysuria [ ] frequency [ ] hematuria [ ] discharge [ ] flank pain  [ ] incontinence  Musculoskeletal:  [ ] myalgias [ ] arthralgias [ ] arthritis  [ ] back pain  Neurological:  [ ] headache [ ] seizures  [ ] confusion/altered mental status  Psychiatric:  [ ] anxiety [ ] depression	  Hematology/Lymphatics:  [ ] lymphadenopathy  Endocrine:  [ ] adrenal [ ] thyroid  Allergic/Immunologic:	 [ ] transplant [ ] seasonal    Vital Signs Last 24 Hrs  T(F): 98.4 (22 @ 05:50), Max: 100 (22 @ 13:00)  Vital Signs Last 24 Hrs  HR: 108 (22 @ 05:50) (96 - 108)  BP: 125/92 (22 @ 05:50) (125/87 - 131/90)  RR: 17 (22 @ 05:50)  SpO2: 97% (22 @ 05:50) (95% - 99%)  Wt(kg): --    PHYSICAL EXAM:  Constitutional: non-toxic, no distress  HEAD/EYES: anicteric, no conjunctival injection  ENT:  supple, no thrush  Cardiovascular:   normal S1, S2, no murmur, no edema  Respiratory:  clear BS bilaterally, no wheezes, no rales  GI:  soft, non-tender, normal bowel sounds  :  no CVA tenderness  Musculoskeletal:  no synovitis, normal ROM  Neurologic: awake and alert, normal strength, no focal findings  Skin:  no rash, no erythema, no phlebitis  Heme/Onc: no lymphadenopathy   Psychiatric:  awake, alert, appropriate mood                            10.6   7.40  )-----------( 393      ( 19 Mar 2022 05:34 )             31.5       139  |  103  |  8   ----------------------------<  117<H>  4.2   |  27  |  0.82    Ca    9.2      19 Mar 2022 05:34  Phos  4.1       Mg     2.30         TPro  7.0  /  Alb  3.3  /  TBili  0.3  /  DBili  x   /  AST  14  /  ALT  11  /  AlkPhos  69      Urinalysis Basic - ( 17 Mar 2022 14:48 )    Color: Yellow / Appearance: Clear / S.021 / pH: x  Gluc: x / Ketone: Negative  / Bili: Negative / Urobili: <2 mg/dL   Blood: x / Protein: Trace / Nitrite: Negative   Leuk Esterase: Negative / RBC: 1 /HPF / WBC 1 /HPF   Sq Epi: x / Non Sq Epi: 0 /HPF / Bacteria: Negative    MICROBIOLOGY:    Vancomycin Level, Trough: 13.1 ( @ 05:34)    Culture - Urine (collected 17 Mar 2022 14:38)  Source: Clean Catch Clean Catch (Midstream)  Final Report (18 Mar 2022 13:29):    No growth    Culture - Blood (collected 17 Mar 2022 14:37)  Source: .Blood Blood-Peripheral  Preliminary Report (18 Mar 2022 15:01):    No growth to date.    Culture - Blood (collected 17 Mar 2022 14:37)  Source: .Blood Blood-Peripheral  Preliminary Report (18 Mar 2022 15:01):    No growth to date.      Rapid RVP Result: NotDetec ( @ 13:34)    Prior microbiology    21 CD4 947   21 HBV sAb -, HBV sAg -, HBV cAb +, HCV Ab +        RADIOLOGY:    < from: CT Abdomen and Pelvis w/ Oral Cont and w/ IV Cont (22 @ 15:54) >  IMPRESSION:  Limited evaluation for residual sinus tract. A small droplet of gas is   noted in the left intersphincteric space with associated edema, similar   in appearance to prior CT dated 2022. There is an appearance   concerning for residual tract or collection as described above. MRI is   recommended to better delineate possible fistulae.        --- End of Report ---      < end of copied text >   Patient is a 56 year old male who presents with a chief complaint of rectal pain and bleeding, difficulty ambulating (19 Mar 2022 11:29)    HPI:  Patient is a 56 year old male with PMH anal cancer (dx 2021) s/p radiation and chemotherapy (last 2021) complicated by an ulcer, prostate cancer (dx 2016) s/p radiation (no longer requiring tx), HIV on Biktarvy, HTN, diverticulosis w/ h/o diverticulitis, anxiety, depression, and chronic constipation who presents with uncontrollable rectal pain with bleeding, subjective fevers, chills, diaphoresis, and difficulty ambulating. Patient was told he is cancer free after last radiation therapy in 2021. Since then, though, he has been having increased rectal pain uncontrolled by pain regimen. Pain has progressively worsened over the past number of months and is now associated with new-onset difficulty ambulating. At baseline, patient walks "slowly" on his own without assistance from his house to the car. Needs assistance from wife with everyday activities such as dressing himself. Over the past few days, patient has required use of cane to ambulate. Patient endorses one episode of significant bowel incontinence about 3 weeks ago and multiple small episodes since then. Denies any urinary incontinence. Patient consistently has blood in the toilet bowl, no clots, and blood on toilet paper after bowel movements. Over the past 3 days, patient has had subjective fevers, however has taken multiple measurements of temp and highest has been 99-100F.  Additionally patient has been having chills and diaphoresis. He denies chest pain, SOB, cough, N/V, headache, rash, numbness including saddle anesthesia, dysuria, or hematuria. tmax in the ED was 100.     Labs were unremarkable. Procalcitonin was normal. CD4 count was 239 w/ last CD4 cell count in May 2021 947, UA negative. BCx x 2 showed NGTD. UCx negative. RVP negative. MRSA PCR negative. HIV viral load in process. CT abdomen/pelvis w/ PO contrast showed a residual sinus tract w/ a small droplet of gas is noted in the left intersphincteric space with associated edema, similar in appearance to prior CT dated 2022. There is an appearance concerning for residual tract or collection as described above. He was started on oral biktarvy, IV vancomycin, and IV zosyn. MRI of the L-spine and abdomen/pelvis w/ IV contrast is ordered. ID was consulted for HIV management.     prior hospital charts reviewed [x]  primary team notes reviewed [x]  other consultant notes reviewed [x]    PAST MEDICAL & SURGICAL HISTORY:  HTN (hypertension)  HLD (hyperlipidemia)  HIV infection  Depressive disorder  Anxiety  Prostate cancer  Anal cancer  Diverticulosis  Anal lesion        Allergies  No Known Allergies    ANTIMICROBIALS (past 90 days)  MEDICATIONS  (STANDING):  bictegravir 50 mG/emtricitabine 200 mG/tenofovir alafenamide 25 mG (BIKTARVY)   1 Tablet(s) Oral (22 @ 14:08)    piperacillin/tazobactam IVPB..   25 mL/Hr IV Intermittent (22 @ 08:40)   25 mL/Hr IV Intermittent (22 @ 00:03)   25 mL/Hr IV Intermittent (22 @ 15:05)   25 mL/Hr IV Intermittent (22 @ 04:11)    piperacillin/tazobactam IVPB...   200 mL/Hr IV Intermittent (22 @ 13:43)    vancomycin  IVPB   300 mL/Hr IV Intermittent (22 @ 12:39)    vancomycin  IVPB   300 mL/Hr IV Intermittent (22 @ 06:24)   300 mL/Hr IV Intermittent (22 @ 21:55)    vancomycin  IVPB.   250 mL/Hr IV Intermittent (22 @ 15:01)        bictegravir 50 mG/emtricitabine 200 mG/tenofovir alafenamide 25 mG (BIKTARVY) 1 daily  piperacillin/tazobactam IVPB.. 3.375 every 8 hours  vancomycin  IVPB    vancomycin  IVPB 1500 every 12 hours    OTHER MEDS: MEDICATIONS  (STANDING):  acetaminophen     Tablet .. 650 every 6 hours PRN  ALPRAZolam 1 daily PRN  atorvastatin 10 at bedtime  gabapentin 200 <User Schedule>  gabapentin 300 at bedtime  HYDROmorphone  Injectable 1 every 3 hours PRN  HYDROmorphone  Injectable 0.5 every 3 hours PRN  lactulose Syrup 10 four times a day  metoprolol succinate ER 50 daily  morphine ER Tablet 60 every 8 hours  OLANZapine 10 at bedtime  polyethylene glycol 3350 17 two times a day  psyllium Powder 1 two times a day  senna 2 two times a day  zolpidem 5 at bedtime PRN  zolpidem 5 at bedtime PRN    SOCIAL HISTORY:   does not smoke, drink alcohol, or use recreational drugs     FAMILY HISTORY:  No pertinent family history in first degree relatives      REVIEW OF SYSTEMS  [  ] ROS unobtainable because:    [x] All other systems negative except as noted below:	    Constitutional:  [ ] fever [x] chills  [ ] weight loss  [x] weakness  Skin:  [ ] rash [ ] phlebitis	  Eyes: [ ] icterus [ ] pain  [ ] discharge	  ENMT: [ ] sore throat  [ ] thrush [ ] ulcers [ ] exudates  Respiratory: [ ] dyspnea [ ] hemoptysis [ ] cough [ ] sputum	  Cardiovascular:  [ ] chest pain [ ] palpitations [ ] edema	  Gastrointestinal:  [ ] nausea [ ] vomiting [ ] diarrhea [ ] constipation [ ] pain [x] rectal bleeding 	  Genitourinary:  [ ] dysuria [ ] frequency [ ] hematuria [ ] discharge [ ] flank pain  [ ] incontinence  Musculoskeletal:  [ ] myalgias [ ] arthralgias [ ] arthritis  [ ] back pain  Neurological:  [ ] headache [ ] seizures  [ ] confusion/altered mental status  Psychiatric:  [ ] anxiety [ ] depression	  Hematology/Lymphatics:  [ ] lymphadenopathy  Endocrine:  [ ] adrenal [ ] thyroid  Allergic/Immunologic:	 [ ] transplant [ ] seasonal    Vital Signs Last 24 Hrs  T(F): 98.4 (22 @ 05:50), Max: 100 (22 @ 13:00)  Vital Signs Last 24 Hrs  HR: 108 (22 @ 05:50) (96 - 108)  BP: 125/92 (22 @ 05:50) (125/87 - 131/90)  RR: 17 (22 @ 05:50)  SpO2: 97% (22 @ 05:50) (95% - 99%)  Wt(kg): --    PHYSICAL EXAM:  Constitutional: non-toxic, no distress  HEAD/EYES: anicteric, no conjunctival injection  ENT:  supple, no thrush  Cardiovascular:   normal S1, S2, no murmur, no edema  Respiratory:  clear BS bilaterally, no wheezes, no rales  GI:  soft, non-tender, normal bowel sounds  : + b/l groin tenderness to palpation   Musculoskeletal: gait instability due to pain   Neurologic: awake and alert, normal strength, no focal findings  Skin:  no rash, no erythema, no phlebitis  Heme/Onc: no lymphadenopathy   Psychiatric:  awake, alert, appropriate mood                            10.6   7.40  )-----------( 393      ( 19 Mar 2022 05:34 )             31.5       139  |  103  |  8   ----------------------------<  117<H>  4.2   |  27  |  0.82    Ca    9.2      19 Mar 2022 05:34  Phos  4.1       Mg     2.30         TPro  7.0  /  Alb  3.3  /  TBili  0.3  /  DBili  x   /  AST  14  /  ALT  11  /  AlkPhos  69      Urinalysis Basic - ( 17 Mar 2022 14:48 )    Color: Yellow / Appearance: Clear / S.021 / pH: x  Gluc: x / Ketone: Negative  / Bili: Negative / Urobili: <2 mg/dL   Blood: x / Protein: Trace / Nitrite: Negative   Leuk Esterase: Negative / RBC: 1 /HPF / WBC 1 /HPF   Sq Epi: x / Non Sq Epi: 0 /HPF / Bacteria: Negative    MICROBIOLOGY:    Vancomycin Level, Trough: 13.1 ( @ 05:34)    Culture - Urine (collected 17 Mar 2022 14:38)  Source: Clean Catch Clean Catch (Midstream)  Final Report (18 Mar 2022 13:29):    No growth    Culture - Blood (collected 17 Mar 2022 14:37)  Source: .Blood Blood-Peripheral  Preliminary Report (18 Mar 2022 15:01):    No growth to date.    Culture - Blood (collected 17 Mar 2022 14:37)  Source: .Blood Blood-Peripheral  Preliminary Report (18 Mar 2022 15:01):    No growth to date.      Rapid RVP Result: NotDetec ( @ 13:34)    Prior microbiology    21 CD4 947   21 HBV sAb -, HBV sAg -, HBV cAb +, HCV Ab +        RADIOLOGY:    < from: CT Abdomen and Pelvis w/ Oral Cont and w/ IV Cont (22 @ 15:54) >  IMPRESSION:  Limited evaluation for residual sinus tract. A small droplet of gas is   noted in the left intersphincteric space with associated edema, similar   in appearance to prior CT dated 2022. There is an appearance   concerning for residual tract or collection as described above. MRI is   recommended to better delineate possible fistulae.        --- End of Report ---      < end of copied text >   Patient is a 56 year old male who presents with a chief complaint of rectal pain and bleeding, difficulty ambulating (19 Mar 2022 11:29)    HPI:  Patient is a 56 year old male with PMH anal cancer (dx 2021) s/p radiation and chemotherapy (last 2021) complicated by an ulcer, prostate cancer (dx 2016) s/p radiation (no longer requiring tx), HIV on Biktarvy, HTN, diverticulosis w/ h/o diverticulitis, anxiety, depression, and chronic constipation who presents with uncontrollable rectal pain with bleeding, subjective fevers, chills, diaphoresis, and difficulty ambulating. Patient was told he is cancer free after last radiation therapy in 2021. Since then, though, he has been having increased rectal pain uncontrolled by pain regimen. Pain has progressively worsened over the past number of months and is now associated with new-onset difficulty ambulating. At baseline, patient walks "slowly" on his own without assistance from his house to the car. Needs assistance from wife with everyday activities such as dressing himself. Over the past few days, patient has required use of cane to ambulate. Patient endorses one episode of significant bowel incontinence about 3 weeks ago and multiple small episodes since then. Denies any urinary incontinence. Patient consistently has blood in the toilet bowl, no clots, and blood on toilet paper after bowel movements. Over the past 3 days, patient has had subjective fevers, however has taken multiple measurements of temp and highest has been 99-100F.  Additionally patient has been having chills and diaphoresis. He denies chest pain, SOB, cough, N/V, headache, rash, numbness including saddle anesthesia, dysuria, or hematuria. tmax in the ED was 100.     Labs were unremarkable. Procalcitonin was normal. CD4 count was 239 w/ last CD4 cell count in May 2021 947, UA negative. BCx x 2 showed NGTD. UCx negative. RVP negative. MRSA PCR negative. HIV viral load in process. CT abdomen/pelvis w/ PO contrast showed a residual sinus tract w/ a small droplet of gas is noted in the left intersphincteric space with associated edema, similar in appearance to prior CT dated 2022. There is an appearance concerning for residual tract or collection as described above. He was started on oral biktarvy, IV vancomycin, and IV zosyn. MRI of the L-spine and abdomen/pelvis w/ IV contrast is ordered. ID was consulted for HIV management.     prior hospital charts reviewed [x]  primary team notes reviewed [x]  other consultant notes reviewed [x]    PAST MEDICAL & SURGICAL HISTORY:  HTN (hypertension)  HLD (hyperlipidemia)  HIV infection  Depressive disorder  Anxiety  Prostate cancer  Anal cancer  Diverticulosis  Anal lesion        Allergies  No Known Allergies    ANTIMICROBIALS (past 90 days)  MEDICATIONS  (STANDING):  bictegravir 50 mG/emtricitabine 200 mG/tenofovir alafenamide 25 mG (BIKTARVY)   1 Tablet(s) Oral (22 @ 14:08)    piperacillin/tazobactam IVPB..   25 mL/Hr IV Intermittent (22 @ 08:40)   25 mL/Hr IV Intermittent (22 @ 00:03)   25 mL/Hr IV Intermittent (22 @ 15:05)   25 mL/Hr IV Intermittent (22 @ 04:11)    piperacillin/tazobactam IVPB...   200 mL/Hr IV Intermittent (22 @ 13:43)    vancomycin  IVPB   300 mL/Hr IV Intermittent (22 @ 12:39)    vancomycin  IVPB   300 mL/Hr IV Intermittent (22 @ 06:24)   300 mL/Hr IV Intermittent (22 @ 21:55)    vancomycin  IVPB.   250 mL/Hr IV Intermittent (22 @ 15:01)        bictegravir 50 mG/emtricitabine 200 mG/tenofovir alafenamide 25 mG (BIKTARVY) 1 daily  piperacillin/tazobactam IVPB.. 3.375 every 8 hours  vancomycin  IVPB    vancomycin  IVPB 1500 every 12 hours    OTHER MEDS: MEDICATIONS  (STANDING):  acetaminophen     Tablet .. 650 every 6 hours PRN  ALPRAZolam 1 daily PRN  atorvastatin 10 at bedtime  gabapentin 200 <User Schedule>  gabapentin 300 at bedtime  HYDROmorphone  Injectable 1 every 3 hours PRN  HYDROmorphone  Injectable 0.5 every 3 hours PRN  lactulose Syrup 10 four times a day  metoprolol succinate ER 50 daily  morphine ER Tablet 60 every 8 hours  OLANZapine 10 at bedtime  polyethylene glycol 3350 17 two times a day  psyllium Powder 1 two times a day  senna 2 two times a day  zolpidem 5 at bedtime PRN  zolpidem 5 at bedtime PRN    SOCIAL HISTORY:   , lives with wife, does not smoke, drink alcohol, or use recreational drugs     FAMILY HISTORY:  No recent febrile illness in family members      REVIEW OF SYSTEMS  [  ] ROS unobtainable because:    [x] All other systems negative except as noted below:	    Constitutional:  [ ] fever [x] chills  [ ] weight loss  [x] weakness  Skin:  [ ] rash [ ] phlebitis	  Eyes: [ ] icterus [ ] pain  [ ] discharge	  ENMT: [ ] sore throat  [ ] thrush [ ] ulcers [ ] exudates  Respiratory: [ ] dyspnea [ ] hemoptysis [ ] cough [ ] sputum	  Cardiovascular:  [ ] chest pain [ ] palpitations [ ] edema	  Gastrointestinal:  [ ] nausea [ ] vomiting [ ] diarrhea [ ] constipation [ ] pain [x] rectal bleeding 	  Genitourinary:  [ ] dysuria [ ] frequency [ ] hematuria [ ] discharge [ ] flank pain  [ ] incontinence  Musculoskeletal:  [ ] myalgias [ ] arthralgias [ ] arthritis  [ ] back pain  Neurological:  [ ] headache [ ] seizures  [ ] confusion/altered mental status  Psychiatric:  [ ] anxiety [ ] depression	  Hematology/Lymphatics:  [ ] lymphadenopathy  Endocrine:  [ ] adrenal [ ] thyroid  Allergic/Immunologic:	 [ ] transplant [ ] seasonal    Vital Signs Last 24 Hrs  T(F): 98.4 (22 @ 05:50), Max: 100 (22 @ 13:00)  Vital Signs Last 24 Hrs  HR: 108 (22 @ 05:50) (96 - 108)  BP: 125/92 (22 @ 05:50) (125/87 - 131/90)  RR: 17 (22 @ 05:50)  SpO2: 97% (22 @ 05:50) (95% - 99%)  Wt(kg): --    PHYSICAL EXAM:  Constitutional: non-toxic, no distress  HEAD: atraumatic, normocephalic  EYES: anicteric, no conjunctival injection  ENT:  supple, no thrush  Cardiovascular:   normal S1, S2, no murmur, no edema  Respiratory:  clear BS bilaterally, no wheezes, no rales  GI:  soft, non-tender, normal bowel sounds  : + b/l groin tenderness to palpation   Musculoskeletal: gait instability due to pain   Neurologic: awake and alert, normal strength, no focal findings  Skin:  no rash, no erythema  vascular: no phlebitis  Heme/Onc: no lymphadenopathy   Psychiatric:  awake, alert, appropriate mood                            10.6   7.40  )-----------( 393      ( 19 Mar 2022 05:34 )             31.5       139  |  103  |  8   ----------------------------<  117<H>  4.2   |  27  |  0.82    Ca    9.2      19 Mar 2022 05:34  Phos  4.1       Mg     2.30         TPro  7.0  /  Alb  3.3  /  TBili  0.3  /  DBili  x   /  AST  14  /  ALT  11  /  AlkPhos  69      Urinalysis Basic - ( 17 Mar 2022 14:48 )    Color: Yellow / Appearance: Clear / S.021 / pH: x  Gluc: x / Ketone: Negative  / Bili: Negative / Urobili: <2 mg/dL   Blood: x / Protein: Trace / Nitrite: Negative   Leuk Esterase: Negative / RBC: 1 /HPF / WBC 1 /HPF   Sq Epi: x / Non Sq Epi: 0 /HPF / Bacteria: Negative    MICROBIOLOGY:    Vancomycin Level, Trough: 13.1 ( @ 05:34)    Culture - Urine (collected 17 Mar 2022 14:38)  Source: Clean Catch Clean Catch (Midstream)  Final Report (18 Mar 2022 13:29):    No growth    Culture - Blood (collected 17 Mar 2022 14:37)  Source: .Blood Blood-Peripheral  Preliminary Report (18 Mar 2022 15:01):    No growth to date.    Culture - Blood (collected 17 Mar 2022 14:37)  Source: .Blood Blood-Peripheral  Preliminary Report (18 Mar 2022 15:01):    No growth to date.      Rapid RVP Result: NotDetec ( @ 13:34)    Prior microbiology    21 CD4 947   21 HBV sAb -, HBV sAg -, HBV cAb +, HCV Ab +        RADIOLOGY:    < from: CT Abdomen and Pelvis w/ Oral Cont and w/ IV Cont (22 @ 15:54) >  IMPRESSION:  Limited evaluation for residual sinus tract. A small droplet of gas is   noted in the left intersphincteric space with associated edema, similar   in appearance to prior CT dated 2022. There is an appearance   concerning for residual tract or collection as described above. MRI is   recommended to better delineate possible fistulae.        --- End of Report ---      < end of copied text >

## 2022-03-19 NOTE — CONSULT NOTE ADULT - ATTENDING COMMENTS
#Anal SCC s/p resection, RT, and chemo  #Prostate ca s/p XRT  #Acute on chronic anal pain attributed to chronic ulcer from SCC treatment    --MR pelvis to further evaluation perianal region given concern for possible fistulous tract  --Pain management per primary team/surgery  --No plan for endoscopic evaluation at this time, as noted above  --Consider colorectal surgery consultation for additional recommendations
56 m with HTN< HIV on Biktarvy, last VL:UD and CD4>900, prostate ca 2016 s/p radiation, anal cancer (dx 4/2021) s/p radiation and chemotherapy (last 8/2021) complicated by an ulcer,  p/w rectal bleeding and pain, also b/l inguinal pain  afebrile, tmax: 100 but tachy, WBC normal  CT: Limited evaluation for residual sinus tract. A small droplet of gas is noted in the left intersphincteric space with associated edema, similar in appearance to prior CT dated 1/18/2022. There is an appearance concerning for residual tract  blood and urine cx negative  CD4 now 239    HIV on Biktarvy  anal Ca s/p chemo and RT with rectal ulcer, now with rectal pain and bleeding, CT with unchanged gas and edema in the L intersphincteric space, ?residual tract  b/l inguinal pain, unclear etiology    * pelvis MRI with liberty  * c/w zosyn until the MRI is done  * c/w biktarvy  * discontinue vanco  * f/u the HIV VL  * f/u with surgery    The above assessment and plan was discussed with the primary team    Denise Gordon MD  contact on teams  After 5pm and on weekends call [phone]

## 2022-03-19 NOTE — PROGRESS NOTE ADULT - PROBLEM SELECTOR PLAN 11
DVT Prophylaxis: hold 2/2 anal bleed  Diet: regular  Code Status: full code  Dispo: pending clinical course, PT said Home with Home PT

## 2022-03-19 NOTE — PROGRESS NOTE ADULT - SUBJECTIVE AND OBJECTIVE BOX
*************************************  Yeison Johnson M.D.| PGY-1  Department of Internal Medicine  *************************************      Patient is a 56y old  Male who presents with a chief complaint of Rectal pain and bleeding, difficulty ambulating (19 Mar 2022 08:56)      SUBJECTIVE / OVERNIGHT EVENTS: Patient said his pain is well controlled. however, he is having multiple bowel movements which are bloody and painful (his baseline) We Dc the methylnaltrexone to dec # of bowel movements.     MEDICATIONS  (STANDING):  atorvastatin 10 milliGRAM(s) Oral at bedtime  bictegravir 50 mG/emtricitabine 200 mG/tenofovir alafenamide 25 mG (BIKTARVY) 1 Tablet(s) Oral daily  chlorhexidine 2% Cloths 1 Application(s) Topical daily  gabapentin 200 milliGRAM(s) Oral <User Schedule>  gabapentin 300 milliGRAM(s) Oral at bedtime  lactulose Syrup 10 Gram(s) Oral four times a day  methylnaltrexone Injectable 8 milliGRAM(s) SubCutaneous daily  metoprolol succinate ER 50 milliGRAM(s) Oral daily  morphine ER Tablet 60 milliGRAM(s) Oral every 8 hours  OLANZapine 10 milliGRAM(s) Oral at bedtime  piperacillin/tazobactam IVPB.. 3.375 Gram(s) IV Intermittent every 8 hours  polyethylene glycol 3350 17 Gram(s) Oral two times a day  psyllium Powder 1 Packet(s) Oral two times a day  senna 2 Tablet(s) Oral two times a day  vancomycin  IVPB      vancomycin  IVPB 1500 milliGRAM(s) IV Intermittent every 12 hours    MEDICATIONS  (PRN):  acetaminophen     Tablet .. 650 milliGRAM(s) Oral every 6 hours PRN Temp greater or equal to 38C (100.4F), Mild Pain (1 - 3)  ALPRAZolam 1 milliGRAM(s) Oral daily PRN anxiety  HYDROmorphone  Injectable 1 milliGRAM(s) IV Push every 3 hours PRN Severe Pain (7 - 10)  HYDROmorphone  Injectable 0.5 milliGRAM(s) IV Push every 3 hours PRN Moderate Pain (4 - 6)  zolpidem 5 milliGRAM(s) Oral at bedtime PRN Insomnia  zolpidem 5 milliGRAM(s) Oral at bedtime PRN Insomnia      CAPILLARY BLOOD GLUCOSE        I&O's Summary    18 Mar 2022 07:01  -  19 Mar 2022 07:00  --------------------------------------------------------  IN: 0 mL / OUT: 1 mL / NET: -1 mL        Vital Signs Last 24 Hrs  T(C): 36.9 (19 Mar 2022 05:50), Max: 37.3 (18 Mar 2022 12:25)  T(F): 98.4 (19 Mar 2022 05:50), Max: 99.2 (18 Mar 2022 12:25)  HR: 108 (19 Mar 2022 05:50) (96 - 108)  BP: 125/92 (19 Mar 2022 05:50) (125/87 - 131/90)  BP(mean): --  RR: 17 (19 Mar 2022 05:50) (17 - 18)  SpO2: 97% (19 Mar 2022 05:50) (95% - 99%)    PHYSICAL EXAM:  GENERAL: NAD, well-developed, well-nourished  HEAD: Atraumatic, Normocephalic  EYES: EOMI, PERRLA, conjunctiva and sclera clear  NECK: Supple, No JVD  CHEST/LUNG: Clear to auscultation bilaterally; No wheezes or crackles  HEART: Normal S1/S2; Regular rate and rhythm; No murmurs, rubs, or gallops  ABDOMEN: Soft, Nontender, Nondistended; Bowel sounds present  EXTREMITIES: 2+ Peripheral Pulses; No clubbing, cyanosis, or edema  PSYCH: A&Ox3  NEUROLOGY: no focal neurologic deficit  SKIN: No rashes or lesions    LABS:                        10.6   7.40  )-----------( 393      ( 19 Mar 2022 05:34 )             31.5      03-19    139  |  103  |  8   ----------------------------<  117<H>  4.2   |  27  |  0.82    Ca    9.2      19 Mar 2022 05:34  Phos  4.1     -  Mg     2.30         TPro  7.0  /  Alb  3.3  /  TBili  0.3  /  DBili  x   /  AST  14  /  ALT  11  /  AlkPhos  69  -    PT/INR - ( 17 Mar 2022 13:30 )   PT: 14.7 sec;   INR: 1.26 ratio         PTT - ( 17 Mar 2022 13:30 )  PTT:31.4 sec      Urinalysis Basic - ( 17 Mar 2022 14:48 )    Color: Yellow / Appearance: Clear / S.021 / pH: x  Gluc: x / Ketone: Negative  / Bili: Negative / Urobili: <2 mg/dL   Blood: x / Protein: Trace / Nitrite: Negative   Leuk Esterase: Negative / RBC: 1 /HPF / WBC 1 /HPF   Sq Epi: x / Non Sq Epi: 0 /HPF / Bacteria: Negative        RADIOLOGY & ADDITIONAL TESTS:    Imaging Personally Reviewed:    Consultant(s) Notes Reviewed:      Care Discussed with Consultants/Other Providers:

## 2022-03-19 NOTE — PROGRESS NOTE ADULT - ASSESSMENT
57 y/o M w/ PMH rectal cancer s/p radiation, prostate cancer s/p radiation, HIV on biktarvy, diverticulosis w/ h/o diverticulitis, anxiety, depression, HTN, chronic constipation presents with uncontrollable rectal pain, bleeding, subjective fevers, chills, diaphoresis, and difficulty ambulating.

## 2022-03-19 NOTE — CONSULT NOTE ADULT - ASSESSMENT
Assessment:  Patient is a 56 year old male with PMH anal cancer (dx 4/2021) s/p radiation and chemotherapy (last 8/2021) complicated by an ulcer, prostate cancer (dx 2016) s/p radiation (no longer requiring tx), HIV on Biktarvy, HTN, diverticulosis w/ h/o diverticulitis, anxiety, depression, and chronic constipation who presents with uncontrollable rectal pain with bleeding, subjective fevers, chills, diaphoresis, and difficulty ambulating. Patient was told he is cancer free after last radiation therapy in August 2021. Since then, though, he has been having increased rectal pain uncontrolled by pain regimen. Pain has progressively worsened over the past number of months and is now associated with new-onset difficulty ambulating. At baseline, patient walks "slowly" on his own without assistance from his house to the car. Needs assistance from wife with everyday activities such as dressing himself. Over the past few days, patient has required use of cane to ambulate. Patient endorses one episode of significant bowel incontinence about 3 weeks ago and multiple small episodes since then. Denies any urinary incontinence. Patient consistently has blood in the toilet bowl, no clots, and blood on toilet paper after bowel movements. Over the past 3 days, patient has had subjective fevers, however has taken multiple measurements of temp and highest has been 99-100F.  Additionally patient has been having chills and diaphoresis. ID was consulted for HIV management.     Plan:  # HIV infection  - c/w PO biktarvy for now  - HIV viral load in process  - check gonorrhea/chlamydia   - pending MRI of the abdomen/pelvis w/ and w/o contrast    # Suspected rectal fistula and ulcer  - c/w IV vancomycin and IV zosyn for now  - check GI PCR  - colorectal surgery and GI on board   - monitor fever curve  - follow white count and renal function daily  - ID will continue to follow    Case not yet discussed w/ attending      Wang Sims MD PGY-5  Fellow, Infectious Diseases   Pager: [phone]  If no response, after 5pm and on weekends: Call [phone]   Assessment:  Patient is a 56 year old male with PMH anal cancer (dx 4/2021) s/p radiation and chemotherapy (last 8/2021) complicated by an ulcer, prostate cancer (dx 2016) s/p radiation (no longer requiring tx), HIV on Biktarvy, HTN, diverticulosis w/ h/o diverticulitis, anxiety, depression, and chronic constipation who presents with uncontrollable rectal pain with bleeding, subjective fevers, chills, diaphoresis, and difficulty ambulating. Patient was told he is cancer free after last radiation therapy in August 2021. Since then, though, he has been having increased rectal pain uncontrolled by pain regimen. Pain has progressively worsened over the past number of months and is now associated with new-onset difficulty ambulating. At baseline, patient walks "slowly" on his own without assistance from his house to the car. Needs assistance from wife with everyday activities such as dressing himself. Over the past few days, patient has required use of cane to ambulate. Patient endorses one episode of significant bowel incontinence about 3 weeks ago and multiple small episodes since then. Denies any urinary incontinence. Patient consistently has blood in the toilet bowl, no clots, and blood on toilet paper after bowel movements. Over the past 3 days, patient has had subjective fevers, however has taken multiple measurements of temp and highest has been 99-100F.  Additionally patient has been having chills and diaphoresis. ID was consulted for HIV management.     Plan:  # HIV infection  - c/w PO biktarvy for now  - HIV viral load in process  - patient wants to follow in ID clinic on [address], can make appointment on discharge   - pending MRI of the abdomen/pelvis w/ and w/o contrast    # Suspected rectal fistula and ulcer  - c/w IV zosyn for now  - discontinue IV vancomycin  - colorectal surgery and GI on board   - monitor fever curve  - follow white count and renal function daily  - ID will continue to follow  - plan of care discussed w/ primary team     Patient seen w/ attending MD Wang Troy MD PGY-5  Fellow, Infectious Diseases   Pager: [phone]  If no response, after 5pm and on weekends: Call [phone]

## 2022-03-19 NOTE — CONSULT NOTE ADULT - REASON FOR ADMISSION
Rectal pain and bleeding, difficulty ambulating

## 2022-03-19 NOTE — PROGRESS NOTE ADULT - ATTENDING COMMENTS
A 55 y/o M with PMH anal cancer (dx 4/2021) s/p radiation and chemo (last 8/2021) complicated by an ulcer, prostate cancer (dx 2016) s/p radiation (no longer requiring tx), HIV on Biktarvy, HTN, diverticulosis w/ h/o diverticulitis, anxiety, depression, and chronic constipation presents with uncontrollable rectal pain with hematochezia. Surgery following. CTAP showing possible fistula vs. abscess. MRI pelvis pending- to f/u with Colorectal surgery once MRI results. IV consulted, appreciate recs- can d/c Vanco and continue empiric IV Zosyn for now given colonic fistula. Also following for low CD4 counts, continue Biktarvy. GI consult pending for possible endoscopic evaluation for hematochezia, likely from radiation proctitis. Palliative following for pain management. MRI L spine also pending to r/o cord compression/cauda equina (low suspicion for cauda equina, M/S intact) causing new gait instability with b/l LE pain. DC pending hospital course, PT evaluation.

## 2022-03-20 LAB
ALBUMIN SERPL ELPH-MCNC: 3.3 G/DL — SIGNIFICANT CHANGE UP (ref 3.3–5)
ALP SERPL-CCNC: 68 U/L — SIGNIFICANT CHANGE UP (ref 40–120)
ALT FLD-CCNC: 10 U/L — SIGNIFICANT CHANGE UP (ref 4–41)
ANION GAP SERPL CALC-SCNC: 11 MMOL/L — SIGNIFICANT CHANGE UP (ref 7–14)
AST SERPL-CCNC: 12 U/L — SIGNIFICANT CHANGE UP (ref 4–40)
BASOPHILS # BLD AUTO: 0.03 K/UL — SIGNIFICANT CHANGE UP (ref 0–0.2)
BASOPHILS NFR BLD AUTO: 0.4 % — SIGNIFICANT CHANGE UP (ref 0–2)
BILIRUB SERPL-MCNC: 0.3 MG/DL — SIGNIFICANT CHANGE UP (ref 0.2–1.2)
BUN SERPL-MCNC: 9 MG/DL — SIGNIFICANT CHANGE UP (ref 7–23)
CALCIUM SERPL-MCNC: 9.3 MG/DL — SIGNIFICANT CHANGE UP (ref 8.4–10.5)
CHLORIDE SERPL-SCNC: 103 MMOL/L — SIGNIFICANT CHANGE UP (ref 98–107)
CO2 SERPL-SCNC: 25 MMOL/L — SIGNIFICANT CHANGE UP (ref 22–31)
CREAT SERPL-MCNC: 0.85 MG/DL — SIGNIFICANT CHANGE UP (ref 0.5–1.3)
EGFR: 103 ML/MIN/1.73M2 — SIGNIFICANT CHANGE UP
EOSINOPHIL # BLD AUTO: 0.03 K/UL — SIGNIFICANT CHANGE UP (ref 0–0.5)
EOSINOPHIL NFR BLD AUTO: 0.4 % — SIGNIFICANT CHANGE UP (ref 0–6)
FERRITIN SERPL-MCNC: 488 NG/ML — HIGH (ref 30–400)
GLUCOSE SERPL-MCNC: 104 MG/DL — HIGH (ref 70–99)
HCT VFR BLD CALC: 32.9 % — LOW (ref 39–50)
HGB BLD-MCNC: 11 G/DL — LOW (ref 13–17)
IANC: 5.25 K/UL — SIGNIFICANT CHANGE UP (ref 1.5–8.5)
IMM GRANULOCYTES NFR BLD AUTO: 0.5 % — SIGNIFICANT CHANGE UP (ref 0–1.5)
IRON SATN MFR SERPL: 21 % — SIGNIFICANT CHANGE UP (ref 14–50)
IRON SATN MFR SERPL: 35 UG/DL — LOW (ref 45–165)
LYMPHOCYTES # BLD AUTO: 1.49 K/UL — SIGNIFICANT CHANGE UP (ref 1–3.3)
LYMPHOCYTES # BLD AUTO: 18.4 % — SIGNIFICANT CHANGE UP (ref 13–44)
MCHC RBC-ENTMCNC: 28.5 PG — SIGNIFICANT CHANGE UP (ref 27–34)
MCHC RBC-ENTMCNC: 33.4 GM/DL — SIGNIFICANT CHANGE UP (ref 32–36)
MCV RBC AUTO: 85.2 FL — SIGNIFICANT CHANGE UP (ref 80–100)
MONOCYTES # BLD AUTO: 1.26 K/UL — HIGH (ref 0–0.9)
MONOCYTES NFR BLD AUTO: 15.6 % — HIGH (ref 2–14)
NEUTROPHILS # BLD AUTO: 5.25 K/UL — SIGNIFICANT CHANGE UP (ref 1.8–7.4)
NEUTROPHILS NFR BLD AUTO: 64.7 % — SIGNIFICANT CHANGE UP (ref 43–77)
NRBC # BLD: 0 /100 WBCS — SIGNIFICANT CHANGE UP
NRBC # FLD: 0 K/UL — SIGNIFICANT CHANGE UP
PLATELET # BLD AUTO: 401 K/UL — HIGH (ref 150–400)
POTASSIUM SERPL-MCNC: 3.8 MMOL/L — SIGNIFICANT CHANGE UP (ref 3.5–5.3)
POTASSIUM SERPL-SCNC: 3.8 MMOL/L — SIGNIFICANT CHANGE UP (ref 3.5–5.3)
PROT SERPL-MCNC: 6.9 G/DL — SIGNIFICANT CHANGE UP (ref 6–8.3)
RBC # BLD: 3.86 M/UL — LOW (ref 4.2–5.8)
RBC # FLD: 13.4 % — SIGNIFICANT CHANGE UP (ref 10.3–14.5)
SODIUM SERPL-SCNC: 139 MMOL/L — SIGNIFICANT CHANGE UP (ref 135–145)
TIBC SERPL-MCNC: 169 UG/DL — LOW (ref 220–430)
UIBC SERPL-MCNC: 134 UG/DL — SIGNIFICANT CHANGE UP (ref 110–370)
WBC # BLD: 8.1 K/UL — SIGNIFICANT CHANGE UP (ref 3.8–10.5)
WBC # FLD AUTO: 8.1 K/UL — SIGNIFICANT CHANGE UP (ref 3.8–10.5)

## 2022-03-20 PROCEDURE — 99233 SBSQ HOSP IP/OBS HIGH 50: CPT | Mod: GC

## 2022-03-20 RX ORDER — LACTULOSE 10 G/15ML
10 SOLUTION ORAL
Refills: 0 | Status: DISCONTINUED | OUTPATIENT
Start: 2022-03-20 | End: 2022-03-20

## 2022-03-20 RX ORDER — HYDROMORPHONE HYDROCHLORIDE 2 MG/ML
1.5 INJECTION INTRAMUSCULAR; INTRAVENOUS; SUBCUTANEOUS
Refills: 0 | Status: DISCONTINUED | OUTPATIENT
Start: 2022-03-20 | End: 2022-03-22

## 2022-03-20 RX ORDER — HYDROMORPHONE HYDROCHLORIDE 2 MG/ML
1 INJECTION INTRAMUSCULAR; INTRAVENOUS; SUBCUTANEOUS
Refills: 0 | Status: DISCONTINUED | OUTPATIENT
Start: 2022-03-20 | End: 2022-03-24

## 2022-03-20 RX ORDER — METHYLNALTREXONE BROMIDE 12 MG/.6ML
12 INJECTION, SOLUTION SUBCUTANEOUS DAILY
Refills: 0 | Status: COMPLETED | OUTPATIENT
Start: 2022-03-20 | End: 2022-03-22

## 2022-03-20 RX ADMIN — ATORVASTATIN CALCIUM 10 MILLIGRAM(S): 80 TABLET, FILM COATED ORAL at 21:05

## 2022-03-20 RX ADMIN — HYDROMORPHONE HYDROCHLORIDE 1 MILLIGRAM(S): 2 INJECTION INTRAMUSCULAR; INTRAVENOUS; SUBCUTANEOUS at 16:27

## 2022-03-20 RX ADMIN — MORPHINE SULFATE 60 MILLIGRAM(S): 50 CAPSULE, EXTENDED RELEASE ORAL at 13:25

## 2022-03-20 RX ADMIN — OLANZAPINE 10 MILLIGRAM(S): 15 TABLET, FILM COATED ORAL at 21:05

## 2022-03-20 RX ADMIN — PIPERACILLIN AND TAZOBACTAM 25 GRAM(S): 4; .5 INJECTION, POWDER, LYOPHILIZED, FOR SOLUTION INTRAVENOUS at 23:43

## 2022-03-20 RX ADMIN — HYDROMORPHONE HYDROCHLORIDE 1.5 MILLIGRAM(S): 2 INJECTION INTRAMUSCULAR; INTRAVENOUS; SUBCUTANEOUS at 21:04

## 2022-03-20 RX ADMIN — PIPERACILLIN AND TAZOBACTAM 25 GRAM(S): 4; .5 INJECTION, POWDER, LYOPHILIZED, FOR SOLUTION INTRAVENOUS at 00:28

## 2022-03-20 RX ADMIN — MORPHINE SULFATE 60 MILLIGRAM(S): 50 CAPSULE, EXTENDED RELEASE ORAL at 22:15

## 2022-03-20 RX ADMIN — MORPHINE SULFATE 60 MILLIGRAM(S): 50 CAPSULE, EXTENDED RELEASE ORAL at 06:42

## 2022-03-20 RX ADMIN — Medication 50 MILLIGRAM(S): at 06:42

## 2022-03-20 RX ADMIN — BICTEGRAVIR SODIUM, EMTRICITABINE, AND TENOFOVIR ALAFENAMIDE FUMARATE 1 TABLET(S): 30; 120; 15 TABLET ORAL at 21:05

## 2022-03-20 RX ADMIN — HYDROMORPHONE HYDROCHLORIDE 1.5 MILLIGRAM(S): 2 INJECTION INTRAMUSCULAR; INTRAVENOUS; SUBCUTANEOUS at 21:19

## 2022-03-20 RX ADMIN — CHLORHEXIDINE GLUCONATE 1 APPLICATION(S): 213 SOLUTION TOPICAL at 11:34

## 2022-03-20 RX ADMIN — GABAPENTIN 300 MILLIGRAM(S): 400 CAPSULE ORAL at 21:05

## 2022-03-20 RX ADMIN — LACTULOSE 10 GRAM(S): 10 SOLUTION ORAL at 11:38

## 2022-03-20 RX ADMIN — SENNA PLUS 2 TABLET(S): 8.6 TABLET ORAL at 17:20

## 2022-03-20 RX ADMIN — GABAPENTIN 200 MILLIGRAM(S): 400 CAPSULE ORAL at 08:46

## 2022-03-20 RX ADMIN — HYDROMORPHONE HYDROCHLORIDE 1 MILLIGRAM(S): 2 INJECTION INTRAMUSCULAR; INTRAVENOUS; SUBCUTANEOUS at 05:53

## 2022-03-20 RX ADMIN — HYDROMORPHONE HYDROCHLORIDE 1 MILLIGRAM(S): 2 INJECTION INTRAMUSCULAR; INTRAVENOUS; SUBCUTANEOUS at 17:18

## 2022-03-20 RX ADMIN — MORPHINE SULFATE 60 MILLIGRAM(S): 50 CAPSULE, EXTENDED RELEASE ORAL at 13:31

## 2022-03-20 RX ADMIN — PIPERACILLIN AND TAZOBACTAM 25 GRAM(S): 4; .5 INJECTION, POWDER, LYOPHILIZED, FOR SOLUTION INTRAVENOUS at 07:58

## 2022-03-20 RX ADMIN — PIPERACILLIN AND TAZOBACTAM 25 GRAM(S): 4; .5 INJECTION, POWDER, LYOPHILIZED, FOR SOLUTION INTRAVENOUS at 16:14

## 2022-03-20 RX ADMIN — GABAPENTIN 200 MILLIGRAM(S): 400 CAPSULE ORAL at 12:44

## 2022-03-20 RX ADMIN — HYDROMORPHONE HYDROCHLORIDE 1 MILLIGRAM(S): 2 INJECTION INTRAMUSCULAR; INTRAVENOUS; SUBCUTANEOUS at 04:53

## 2022-03-20 NOTE — PROGRESS NOTE ADULT - ATTENDING COMMENTS
A 57 y/o M with PMH anal cancer (dx 4/2021) s/p radiation and chemo (last 8/2021) complicated by an ulcer, prostate cancer (dx 2016) s/p radiation (no longer requiring tx), HIV on Biktarvy, HTN, diverticulosis w/ h/o diverticulitis, anxiety, depression, and chronic constipation presents with uncontrollable rectal pain with hematochezia. Surgery following. CTAP showing possible fistula vs. abscess. MRI pelvis pending- to f/u with Colorectal surgery once MRI results. IV consulted, appreciate recs- can d/c Vanco and continue empiric IV Zosyn for now given colonic fistula. ID also following for low CD4 counts, continue Biktarvy. GI consult pending for possible endoscopic evaluation for hematochezia, likely from radiation proctitis. Palliative following for pain management. MRI L spine was also ordered given c/f gait problems (although gait issue likely d/t the pain limiting mobility, r/o cord compression, low suspicion for cauda equina, M/S intact), DC pending hospital course, PT evaluation. A 57 y/o M with PMH anal cancer (dx 4/2021) s/p radiation and chemo (last 8/2021) complicated by an ulcer, prostate cancer (dx 2016) s/p radiation (no longer requiring tx), HIV on Biktarvy, HTN, diverticulosis w/ h/o diverticulitis, anxiety, depression, and chronic constipation presents with uncontrollable rectal pain with hematochezia. GI/Surgery following. CTAP showing possible fistula vs. abscess. MRI pelvis pending- to f/u with Colorectal surgery once MRI results. IV consulted, appreciate recs- can d/c Vanco and continue empiric IV Zosyn for now given colonic fistula. ID also following for low CD4 counts, continue Biktarvy. Palliative following for pain management. MRI L spine was also ordered given c/f gait problems (although gait issue likely d/t the pain limiting mobility, r/o cord compression, low suspicion for cauda equina, M/S intact), DC pending hospital course, PT evaluation. A 57 y/o M with PMH anal cancer (dx 4/2021) s/p radiation and chemo (last 8/2021) complicated by an ulcer, prostate cancer (dx 2016) s/p radiation (no longer requiring tx), HIV on Biktarvy, HTN, diverticulosis w/ h/o diverticulitis, anxiety, depression, and chronic constipation presents with uncontrollable rectal pain with hematochezia. GI/Surgery following. CTAP showing possible fistula vs. abscess. MRI pelvis pending- to f/u with Colorectal surgery once MRI results. ID following, can d/c Vanco and continue empiric IV Zosyn for now given colonic fistula. ID also following for low CD4 counts, continue Biktarvy. Palliative following for pain management. MRI L spine was also ordered given c/f gait problems (although gait issue likely d/t the pain limiting mobility, r/o cord compression, low suspicion for cauda equina, M/S intact), DC pending hospital course, PT evaluation.

## 2022-03-20 NOTE — PROGRESS NOTE ADULT - PROBLEM SELECTOR PLAN 1
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 MRI for better evaluation of fistula  -GI consult (emailed) for possible endoscopic management of radiation proctitis  -Palliative consult for pain management    - cw bowel regimen (home relistor now given iv)

## 2022-03-20 NOTE — PROGRESS NOTE ADULT - PROBLEM SELECTOR PLAN 3
-acute on chronic gait instability over the past 3 days  -neuro exam benign, 5/5 in UE b/l, 5/5 strength in RLE, and 4/5 strength in LLE which he states is his baseline  -usually ambulates without assistance, now is using cane  -more likely 2/2 anal pain radiating down legs, less likely due to other causes such as new mets    Plan: PT alaina, monitor pending pain control,

## 2022-03-20 NOTE — PROGRESS NOTE ADULT - PROBLEM SELECTOR PLAN 2
-when admitted, patient met SIRS criteria w/ tachycardia and tachypnea  -subjective fevers and chills at home, however afebrile in ED and per patient all measured temps at home <100  -no infectious symptoms  -received 1 dose vanc/zosyn in ED    Plan: given HIV and possible fistula would continue empiric antibiotics pending culture results with zosyn    [ ] assuming no new signs of infection, will DC abx after cultures come back negative -when admitted, patient met SIRS criteria w/ tachycardia and tachypnea  -subjective fevers and chills at home, however afebrile in ED and per patient all measured temps at home <100  -no infectious symptoms  -received 1 dose vanc/zosyn in ED    Plan: given HIV and possible fistula would continue empiric antibiotics pending culture results with zosyn    [ ] assuming no new signs of infection, will DC abx after MRI  come back negative

## 2022-03-20 NOTE — DIETITIAN INITIAL EVALUATION ADULT. - PERTINENT MEDS FT
MEDICATIONS  (STANDING):  atorvastatin 10 milliGRAM(s) Oral at bedtime  bictegravir 50 mG/emtricitabine 200 mG/tenofovir alafenamide 25 mG (BIKTARVY) 1 Tablet(s) Oral daily  chlorhexidine 2% Cloths 1 Application(s) Topical daily  gabapentin 200 milliGRAM(s) Oral <User Schedule>  gabapentin 300 milliGRAM(s) Oral at bedtime  lactulose Syrup 10 Gram(s) Oral four times a day  metoprolol succinate ER 50 milliGRAM(s) Oral daily  morphine ER Tablet 60 milliGRAM(s) Oral every 8 hours  OLANZapine 10 milliGRAM(s) Oral at bedtime  piperacillin/tazobactam IVPB.. 3.375 Gram(s) IV Intermittent every 8 hours  polyethylene glycol 3350 17 Gram(s) Oral two times a day  psyllium Powder 1 Packet(s) Oral two times a day  senna 2 Tablet(s) Oral two times a day

## 2022-03-20 NOTE — DIETITIAN INITIAL EVALUATION ADULT. - OTHER INFO
57 y/o male admitted with dx sepsis. Visited with pt to obtain nutrition hx. Pt said he is eating well. NKFA. He has had chronic issues with constipation but said that the bowel regimen that he is ordered presently is giving him diarrhea; request reducing number of laxative medications to prevent ongoing episodes of diarrhea. Encouraged pt to drink plenty of fluids for both hydration and to prevent constipation. Pt said his UBW is 220 lbs as of a few months ago with current dosing weight of 209 lbs. Pt said his issue with constipation sometimes left him feeling "full" so he would reduce his oral intake at times. Pt presents at mild risk for malnutrition based on <75% of estimated nutrition needs met over 1 week PTA with 5% weight loss over 3 mos PTA. He declined ONS saying that he is eating well at present. Pt without additional nutrition related issues or concerns at this time. RDN services to remain available as needed.

## 2022-03-20 NOTE — PROGRESS NOTE ADULT - SUBJECTIVE AND OBJECTIVE BOX
Thalia Jara MD   PGY3 Internal Medicine  Available on [employer] Teams     SUBJECTIVE / OVERNIGHT EVENTS:      No acute events overnight. Patient seen and evaluated at bedside. No fever/chills.  Denies SOB at rest, chest pain, palpitations, abdominal pain, nausea/vomiting  Dilaudid 1 x 4 for pain   MEDICATIONS  (STANDING):  atorvastatin 10 milliGRAM(s) Oral at bedtime  bictegravir 50 mG/emtricitabine 200 mG/tenofovir alafenamide 25 mG (BIKTARVY) 1 Tablet(s) Oral daily  chlorhexidine 2% Cloths 1 Application(s) Topical daily  gabapentin 200 milliGRAM(s) Oral <User Schedule>  gabapentin 300 milliGRAM(s) Oral at bedtime  lactulose Syrup 10 Gram(s) Oral four times a day  metoprolol succinate ER 50 milliGRAM(s) Oral daily  morphine ER Tablet 60 milliGRAM(s) Oral every 8 hours  OLANZapine 10 milliGRAM(s) Oral at bedtime  piperacillin/tazobactam IVPB.. 3.375 Gram(s) IV Intermittent every 8 hours  polyethylene glycol 3350 17 Gram(s) Oral two times a day  psyllium Powder 1 Packet(s) Oral two times a day  senna 2 Tablet(s) Oral two times a day    MEDICATIONS  (PRN):  acetaminophen     Tablet .. 650 milliGRAM(s) Oral every 6 hours PRN Temp greater or equal to 38C (100.4F), Mild Pain (1 - 3)  ALPRAZolam 1 milliGRAM(s) Oral daily PRN anxiety  HYDROmorphone  Injectable 1 milliGRAM(s) IV Push every 3 hours PRN Severe Pain (7 - 10)  HYDROmorphone  Injectable 0.5 milliGRAM(s) IV Push every 3 hours PRN Moderate Pain (4 - 6)  zolpidem 5 milliGRAM(s) Oral at bedtime PRN Insomnia  zolpidem 5 milliGRAM(s) Oral at bedtime PRN Insomnia      CAPILLARY BLOOD GLUCOSE        I&O's Summary      PHYSICAL EXAM:  Vital Signs Last 24 Hrs  T(C): 36.9 (20 Mar 2022 06:35), Max: 37.2 (19 Mar 2022 21:15)  T(F): 98.5 (20 Mar 2022 06:35), Max: 98.9 (19 Mar 2022 21:15)  HR: 99 (20 Mar 2022 06:35) (96 - 109)  BP: 115/83 (20 Mar 2022 06:35) (115/83 - 140/94)  BP(mean): --  RR: 17 (20 Mar 2022 06:35) (17 - 18)  SpO2: 100% (20 Mar 2022 06:35) (97% - 100%)    ____________________  PHYSICAL EXAM:  · CONSTITUTIONAL:	Well-developed, well nourished  · NECK:	No bruits; no thyromegaly. No JVD  ·RESPIRATORY:   Clear to auscultation. Good air movement.  no rales,rhonchi or wheeze. No increased work of breathing.   · CARDIOVASCULAR	RRR  no m/r/g  . GASTROINTESTINAL:  Soft, non tender. No organomegaly. No guarding.  . GENITOURINARY:  No suprapubic tenderness. No costrovertrebral angle tenderness.   . EXTREMITIES: Warm. No cyanosis, clubbing or edema. DP/PT pulses intact.  · NEUROLOGICAL:   alert and oriented x 3; 5/5 strength in b/l extremities. No sensory deficits.   · SKIN:	No lesions; no rash  . LYMPH NODES:	No lymphadedenopathy  · MUSCULOSKELETAL:   No calf tenderness  no joint swelling    LABS:                        11.0   8.10  )-----------( 401      ( 20 Mar 2022 07:24 )             32.9     03-20    139  |  103  |  9   ----------------------------<  104<H>  3.8   |  25  |  0.85    Ca    9.3      20 Mar 2022 07:24  Phos  4.1     03-19  Mg     2.30     03-19    TPro  6.9  /  Alb  3.3  /  TBili  0.3  /  DBili  x   /  AST  12  /  ALT  10  /  AlkPhos  68  03-20              Culture - Urine (collected 17 Mar 2022 14:38)  Source: Clean Catch Clean Catch (Midstream)  Final Report (18 Mar 2022 13:29):    No growth    Culture - Blood (collected 17 Mar 2022 14:37)  Source: .Blood Blood-Peripheral  Preliminary Report (18 Mar 2022 15:01):    No growth to date.    Culture - Blood (collected 17 Mar 2022 14:37)  Source: .Blood Blood-Peripheral  Preliminary Report (18 Mar 2022 15:01):    No growth to date.

## 2022-03-21 ENCOUNTER — APPOINTMENT (OUTPATIENT)
Dept: HYPERBARIC MEDICINE | Facility: CLINIC | Age: 57
End: 2022-03-21

## 2022-03-21 LAB
ANION GAP SERPL CALC-SCNC: 9 MMOL/L — SIGNIFICANT CHANGE UP (ref 7–14)
BUN SERPL-MCNC: 10 MG/DL — SIGNIFICANT CHANGE UP (ref 7–23)
CALCIUM SERPL-MCNC: 8.9 MG/DL — SIGNIFICANT CHANGE UP (ref 8.4–10.5)
CHLORIDE SERPL-SCNC: 104 MMOL/L — SIGNIFICANT CHANGE UP (ref 98–107)
CO2 SERPL-SCNC: 27 MMOL/L — SIGNIFICANT CHANGE UP (ref 22–31)
CREAT SERPL-MCNC: 0.8 MG/DL — SIGNIFICANT CHANGE UP (ref 0.5–1.3)
EGFR: 105 ML/MIN/1.73M2 — SIGNIFICANT CHANGE UP
GLUCOSE SERPL-MCNC: 110 MG/DL — HIGH (ref 70–99)
HCT VFR BLD CALC: 33.6 % — LOW (ref 39–50)
HGB BLD-MCNC: 10.7 G/DL — LOW (ref 13–17)
HIV-1 VIRAL LOAD RESULT: SIGNIFICANT CHANGE UP
HIV1 RNA # SERPL NAA+PROBE: SIGNIFICANT CHANGE UP COPIES/ML
HIV1 RNA SER-IMP: SIGNIFICANT CHANGE UP
HIV1 RNA SERPL NAA+PROBE-ACNC: SIGNIFICANT CHANGE UP
HIV1 RNA SERPL NAA+PROBE-LOG#: SIGNIFICANT CHANGE UP LG COP/ML
MAGNESIUM SERPL-MCNC: 2.5 MG/DL — SIGNIFICANT CHANGE UP (ref 1.6–2.6)
MCHC RBC-ENTMCNC: 27.6 PG — SIGNIFICANT CHANGE UP (ref 27–34)
MCHC RBC-ENTMCNC: 31.8 GM/DL — LOW (ref 32–36)
MCV RBC AUTO: 86.8 FL — SIGNIFICANT CHANGE UP (ref 80–100)
NRBC # BLD: 0 /100 WBCS — SIGNIFICANT CHANGE UP
NRBC # FLD: 0 K/UL — SIGNIFICANT CHANGE UP
PHOSPHATE SERPL-MCNC: 4 MG/DL — SIGNIFICANT CHANGE UP (ref 2.5–4.5)
PLATELET # BLD AUTO: 388 K/UL — SIGNIFICANT CHANGE UP (ref 150–400)
POTASSIUM SERPL-MCNC: 3.8 MMOL/L — SIGNIFICANT CHANGE UP (ref 3.5–5.3)
POTASSIUM SERPL-SCNC: 3.8 MMOL/L — SIGNIFICANT CHANGE UP (ref 3.5–5.3)
RBC # BLD: 3.87 M/UL — LOW (ref 4.2–5.8)
RBC # FLD: 13.4 % — SIGNIFICANT CHANGE UP (ref 10.3–14.5)
SODIUM SERPL-SCNC: 140 MMOL/L — SIGNIFICANT CHANGE UP (ref 135–145)
WBC # BLD: 7.53 K/UL — SIGNIFICANT CHANGE UP (ref 3.8–10.5)
WBC # FLD AUTO: 7.53 K/UL — SIGNIFICANT CHANGE UP (ref 3.8–10.5)

## 2022-03-21 PROCEDURE — 72197 MRI PELVIS W/O & W/DYE: CPT | Mod: 26

## 2022-03-21 PROCEDURE — 99232 SBSQ HOSP IP/OBS MODERATE 35: CPT

## 2022-03-21 PROCEDURE — 99233 SBSQ HOSP IP/OBS HIGH 50: CPT

## 2022-03-21 PROCEDURE — 72158 MRI LUMBAR SPINE W/O & W/DYE: CPT | Mod: 26

## 2022-03-21 RX ADMIN — HYDROMORPHONE HYDROCHLORIDE 1.5 MILLIGRAM(S): 2 INJECTION INTRAMUSCULAR; INTRAVENOUS; SUBCUTANEOUS at 20:12

## 2022-03-21 RX ADMIN — HYDROMORPHONE HYDROCHLORIDE 1.5 MILLIGRAM(S): 2 INJECTION INTRAMUSCULAR; INTRAVENOUS; SUBCUTANEOUS at 06:39

## 2022-03-21 RX ADMIN — Medication 50 MILLIGRAM(S): at 05:34

## 2022-03-21 RX ADMIN — ATORVASTATIN CALCIUM 10 MILLIGRAM(S): 80 TABLET, FILM COATED ORAL at 21:03

## 2022-03-21 RX ADMIN — HYDROMORPHONE HYDROCHLORIDE 1.5 MILLIGRAM(S): 2 INJECTION INTRAMUSCULAR; INTRAVENOUS; SUBCUTANEOUS at 10:01

## 2022-03-21 RX ADMIN — GABAPENTIN 300 MILLIGRAM(S): 400 CAPSULE ORAL at 21:03

## 2022-03-21 RX ADMIN — METHYLNALTREXONE BROMIDE 12 MILLIGRAM(S): 12 INJECTION, SOLUTION SUBCUTANEOUS at 13:33

## 2022-03-21 RX ADMIN — Medication 1 PACKET(S): at 18:35

## 2022-03-21 RX ADMIN — HYDROMORPHONE HYDROCHLORIDE 1.5 MILLIGRAM(S): 2 INJECTION INTRAMUSCULAR; INTRAVENOUS; SUBCUTANEOUS at 13:42

## 2022-03-21 RX ADMIN — MORPHINE SULFATE 60 MILLIGRAM(S): 50 CAPSULE, EXTENDED RELEASE ORAL at 13:33

## 2022-03-21 RX ADMIN — HYDROMORPHONE HYDROCHLORIDE 1 MILLIGRAM(S): 2 INJECTION INTRAMUSCULAR; INTRAVENOUS; SUBCUTANEOUS at 16:16

## 2022-03-21 RX ADMIN — GABAPENTIN 200 MILLIGRAM(S): 400 CAPSULE ORAL at 12:03

## 2022-03-21 RX ADMIN — HYDROMORPHONE HYDROCHLORIDE 1.5 MILLIGRAM(S): 2 INJECTION INTRAMUSCULAR; INTRAVENOUS; SUBCUTANEOUS at 09:46

## 2022-03-21 RX ADMIN — MORPHINE SULFATE 60 MILLIGRAM(S): 50 CAPSULE, EXTENDED RELEASE ORAL at 14:03

## 2022-03-21 RX ADMIN — PIPERACILLIN AND TAZOBACTAM 25 GRAM(S): 4; .5 INJECTION, POWDER, LYOPHILIZED, FOR SOLUTION INTRAVENOUS at 08:50

## 2022-03-21 RX ADMIN — SENNA PLUS 2 TABLET(S): 8.6 TABLET ORAL at 18:34

## 2022-03-21 RX ADMIN — GABAPENTIN 200 MILLIGRAM(S): 400 CAPSULE ORAL at 08:50

## 2022-03-21 RX ADMIN — MORPHINE SULFATE 60 MILLIGRAM(S): 50 CAPSULE, EXTENDED RELEASE ORAL at 05:31

## 2022-03-21 RX ADMIN — POLYETHYLENE GLYCOL 3350 17 GRAM(S): 17 POWDER, FOR SOLUTION ORAL at 18:34

## 2022-03-21 RX ADMIN — BICTEGRAVIR SODIUM, EMTRICITABINE, AND TENOFOVIR ALAFENAMIDE FUMARATE 1 TABLET(S): 30; 120; 15 TABLET ORAL at 18:35

## 2022-03-21 RX ADMIN — PIPERACILLIN AND TAZOBACTAM 25 GRAM(S): 4; .5 INJECTION, POWDER, LYOPHILIZED, FOR SOLUTION INTRAVENOUS at 23:41

## 2022-03-21 RX ADMIN — OLANZAPINE 10 MILLIGRAM(S): 15 TABLET, FILM COATED ORAL at 21:02

## 2022-03-21 RX ADMIN — HYDROMORPHONE HYDROCHLORIDE 1 MILLIGRAM(S): 2 INJECTION INTRAMUSCULAR; INTRAVENOUS; SUBCUTANEOUS at 16:31

## 2022-03-21 RX ADMIN — PIPERACILLIN AND TAZOBACTAM 25 GRAM(S): 4; .5 INJECTION, POWDER, LYOPHILIZED, FOR SOLUTION INTRAVENOUS at 16:16

## 2022-03-21 RX ADMIN — MORPHINE SULFATE 60 MILLIGRAM(S): 50 CAPSULE, EXTENDED RELEASE ORAL at 21:02

## 2022-03-21 RX ADMIN — ZOLPIDEM TARTRATE 5 MILLIGRAM(S): 10 TABLET ORAL at 21:02

## 2022-03-21 RX ADMIN — HYDROMORPHONE HYDROCHLORIDE 1.5 MILLIGRAM(S): 2 INJECTION INTRAMUSCULAR; INTRAVENOUS; SUBCUTANEOUS at 13:57

## 2022-03-21 RX ADMIN — CHLORHEXIDINE GLUCONATE 1 APPLICATION(S): 213 SOLUTION TOPICAL at 12:03

## 2022-03-21 RX ADMIN — HYDROMORPHONE HYDROCHLORIDE 1.5 MILLIGRAM(S): 2 INJECTION INTRAMUSCULAR; INTRAVENOUS; SUBCUTANEOUS at 06:54

## 2022-03-21 RX ADMIN — HYDROMORPHONE HYDROCHLORIDE 1.5 MILLIGRAM(S): 2 INJECTION INTRAMUSCULAR; INTRAVENOUS; SUBCUTANEOUS at 19:57

## 2022-03-21 NOTE — PROGRESS NOTE ADULT - PROBLEM SELECTOR PLAN 5
- Patient compliant with biktarvy, will continue inpatient. CD4 count 947 in 5/21, now 239  - Undetectable viral load   - ID consulted, rec continuing Biktarvy

## 2022-03-21 NOTE — PROGRESS NOTE ADULT - PROBLEM SELECTOR PLAN 2
-when admitted, patient met SIRS criteria w/ tachycardia and tachypnea  -subjective fevers and chills at home, however afebrile in ED and per patient all measured temps at home <100  -no infectious symptoms  -received 1 dose vanc/zosyn in ED    Plan: given HIV and possible fistula would continue empiric antibiotics pending culture results with zosyn    [ ] assuming no new signs of infection, will DC abx after MRI come back negative

## 2022-03-21 NOTE — PROGRESS NOTE ADULT - PROBLEM SELECTOR PLAN 11
- SCDs for now given reported blood in his stool   - Will consider starting Lovenox MRI abdomen neg and

## 2022-03-21 NOTE — CHART NOTE - NSCHARTNOTEFT_GEN_A_CORE
GI following for hematochezia and rectal pain. As per previous note, no plans for endoscopic evaluation given stable Hg, relatively recent radiation therapy. Awaiting further workup w/ pelvic MRI and surgery follow up.     GI will sign off. Please reconsult as necessary.     All recommendations are tentative until note is attested by attending.     Keith Kitchen, PGY5  Gastroenterology/Hepatology Fellow  Available on [employer] Teams  [phone] ([employer] Short Range Pager)  [phone] (Long Range Pager)    After 5pm, please contact the on-call GI fellow. [phone]

## 2022-03-21 NOTE — PROGRESS NOTE ADULT - ASSESSMENT
56 m with HTN< HIV on Biktarvy, last VL:UD and CD4>900, prostate ca 2016 s/p radiation, anal cancer (dx 4/2021) s/p radiation and chemotherapy (last 8/2021) complicated by an ulcer,  p/w rectal bleeding and pain, also b/l inguinal pain  afebrile, tmax: 100 but tachy, WBC normal  CT: Limited evaluation for residual sinus tract. A small droplet of gas is noted in the left intersphincteric space with associated edema, similar in appearance to prior CT dated 1/18/2022. There is an appearance concerning for residual tract  blood and urine cx negative  CD4 now 239    HIV on Biktarvy, VL UD, CD4 now 239 (was >900 before)  anal Ca s/p chemo and RT with rectal ulcer, now with rectal pain and bleeding, CT with unchanged gas and edema in the L intersphincteric space, ?residual tract  b/l inguinal pain, unclear etiology, LS spine MRI with disc bulges with L4-L5, mild to mod spinal stenosis    * f/u the pelvis MRI with liberty  * c/w zosyn until the MRI is done, if no evidece of infection discontinue zosyn  * c/w biktarvy  * f/u the HIV VL  * f/u with surgery    The above assessment and plan was discussed with the primary team    Denise Gordon MD  contact on teams  After 5pm and on weekends call [phone] .

## 2022-03-21 NOTE — PROGRESS NOTE ADULT - SUBJECTIVE AND OBJECTIVE BOX
Follow Up:  HIV, rectal bleeding, ?collection    Interval History: pt afebrile, no more rectal bleeding, still with b/l inguinal pain    ROS:      All other systems negative    Constitutional: no fever, no chills  Cardiovascular:  no chest pain, no palpitation  Respiratory:  no SOB, no cough  GI:  no abd pain, no vomiting, no more rectal bleed  urinary: no dysuria, no hematuria, no flank pain  musculoskeletal: b/l inguinal pain  skin:  no rash  neurology:  no headache, no seizure      Allergies  No Known Allergies        ANTIMICROBIALS:  bictegravir 50 mG/emtricitabine 200 mG/tenofovir alafenamide 25 mG (BIKTARVY) 1 daily  piperacillin/tazobactam IVPB.. 3.375 every 8 hours      OTHER MEDS:  acetaminophen     Tablet .. 650 milliGRAM(s) Oral every 6 hours PRN  ALPRAZolam 1 milliGRAM(s) Oral daily PRN  atorvastatin 10 milliGRAM(s) Oral at bedtime  chlorhexidine 2% Cloths 1 Application(s) Topical daily  gabapentin 200 milliGRAM(s) Oral <User Schedule>  gabapentin 300 milliGRAM(s) Oral at bedtime  HYDROmorphone  Injectable 1.5 milliGRAM(s) IV Push every 3 hours PRN  HYDROmorphone  Injectable 1 milliGRAM(s) IV Push every 3 hours PRN  methylnaltrexone Injectable 12 milliGRAM(s) SubCutaneous daily  metoprolol succinate ER 50 milliGRAM(s) Oral daily  morphine ER Tablet 60 milliGRAM(s) Oral every 8 hours  OLANZapine 10 milliGRAM(s) Oral at bedtime  polyethylene glycol 3350 17 Gram(s) Oral two times a day  psyllium Powder 1 Packet(s) Oral two times a day  senna 2 Tablet(s) Oral two times a day  zolpidem 5 milliGRAM(s) Oral at bedtime PRN  zolpidem 5 milliGRAM(s) Oral at bedtime PRN      Vital Signs Last 24 Hrs  T(C): 36.6 (21 Mar 2022 09:23), Max: 36.9 (20 Mar 2022 21:11)  T(F): 97.9 (21 Mar 2022 09:23), Max: 98.5 (20 Mar 2022 21:11)  HR: 100 (21 Mar 2022 09:23) (100 - 109)  BP: 138/92 (21 Mar 2022 09:23) (138/92 - 145/90)  BP(mean): --  RR: 17 (21 Mar 2022 09:23) (16 - 18)  SpO2: 98% (21 Mar 2022 09:23) (98% - 99%)    Physical Exam:  General:    NAD,  non toxic  Cardio:     regular S1, S2  Respiratory:    clear b/l,    no wheezing  abd:     soft,   BS +,   no tenderness  :   no CVAT,  no suprapubic tenderness,   no  davis  Musculoskeletal:   no joint swelling  vascular: no phlebitis  Skin:    no rash                          10.7   7.53  )-----------( 388      ( 21 Mar 2022 06:39 )             33.6       03-21    140  |  104  |  10  ----------------------------<  110<H>  3.8   |  27  |  0.80    Ca    8.9      21 Mar 2022 06:39  Phos  4.0     03-21  Mg     2.50     03-21    TPro  6.9  /  Alb  3.3  /  TBili  0.3  /  DBili  x   /  AST  12  /  ALT  10  /  AlkPhos  68  03-20          MICROBIOLOGY:  v  Clean Catch Clean Catch (Midstream)  03-17-22   No growth  --  --      .Blood Blood-Peripheral  03-17-22   No growth to date.  --  --        HIV-1 RNA Quantitative, Viral Load Log: NOT DET. lg /mL (03-18-22 @ 12:31)    Rapid RVP Result: NotDetec (03-17 @ 13:34)        RADIOLOGY:  Images independently visualized and reviewed personally, findings as below  < from: MR Lumbar Spine w/wo IV Cont (03.21.22 @ 09:11) >  IMPRESSION:  Disc bulges with L4-L5 mild to moderate spinal stenosis.      < end of copied text >  < from: CT Abdomen and Pelvis w/ Oral Cont and w/ IV Cont (03.17.22 @ 15:54) >  IMPRESSION:  Limited evaluation for residual sinus tract. A small droplet of gas is   noted in the left intersphincteric space with associated edema, similar   in appearance to prior CT dated 1/18/2022. There is an appearance   concerning for residual tract or collection as described above. MRI is   recommended to better delineate possible fistulae.    < end of copied text >

## 2022-03-21 NOTE — PROGRESS NOTE ADULT - ASSESSMENT
55 y/o M w/ PMH rectal cancer s/p radiation, prostate cancer s/p radiation, HIV on biktarvy, diverticulosis w/ h/o diverticulitis, anxiety, depression, HTN, chronic constipation presents with uncontrollable rectal pain, bleeding, and difficulty ambulating.

## 2022-03-21 NOTE — HISTORY OF PRESENT ILLNESS
[FreeTextEntry1] : 56yoF with anal cancer presents for follow-up palliative care visit, referred by Oncology.  \par PMH significant for prostate cancer s/p RT in 2016, HIV on Biktarvy.\par \par In 2020 at age of 55 patient had his screening colonoscopy performed by Dr. Lawrence in June 2020 that as per patient was informed to be within normal limits. Subsequently patient had felt a bump in the perirectal area and at the time was told by GI that this could be a hemorrhoid and was treated as such.\par His symptoms did improve and was eventually referred to Dr. Avilez for further evaluation and in March 2021 patient was seen by Dr. Avilez and underwent an exam and biopsy in April that revealed evidence of anal squamous cell carcinoma. Patient was subsequently referred for evaluation and treatment. Patient did not have full excision of the tumor rather a excisional biopsy.\par \par Main reason for which patient is referred is pain which has been an issue for him for the last 3 months. The pain is localized to the anus/rectum. It is severe, not well controlled. Worse with defecation, sitting. At its worst the pain is 10/10. Rates it as 8/10 presently. \par \par He is currently using Oxycodone IR 20mg PRN, takes this every 6 hours. He was previously tried on transdermal fentanyl 25mcg/hr and oxycontin but he states that neither helped his pain. He no longer uses either. \par Has tried using anusol suppository without relief. \par \par Underwent EUA by Dr. Avilez on 11/26/21 for evaluation of persistent kandy-anal pain and a 4 x 4 cm ulcer was found but no fissure or fistula. A biopsy was performed and was negative for dysplasia or malignancy.  \par \par Interval History:\par Patient presents for follow-up, seen via TeleMedicine. He is on week 5 of HBOT, total of 6 weeks is the duration of treatment. \par \par Rectal pain remains severe. He has been taking MS ER 60mg q6h and Hydromorphone 4-8mg every 4 hours but the pain has heightened.  Pain gets as high as 10/10, as low as 6/10 for approximately 3 hours after PRN use. He states that pain is worse with BM's and with standing for extended periods of time. He also performs Sitz bathes 2-3 times a day which provide relief while in the bath only. \par \par Met with telepain service, gabapentin 300mg QHS was prescribed, he trialed but stopped as he found no effect.\par \par He has not seen Dr. Manuel since initiation of HBOT.\par \par Current pain regimen:\par MS ER 60mg TID\par PRN Hydromorphone 4-8mg q4h \par Gabapentin 100/100/300mg\par \par ROS:\par + appetite is good\par + hot flushes from prostate ca therapy\par +constipation - prune juice and relistor daily\par +anxiety d/o - uses alprazolam 1mg PRN, olanzapine 10mg QHS\par +insomnia - is on long-standing ambien 10mg QHS \par Denies nausea/vomiting.\par All other ROS as outlined or noncontributory. \par \par Patient is , lives with his wife. \par He is not presently driving, uses a car service to get to medical appointments. \par Quit smoking tobacco about two years ago. No EtOH. \par Passes time by watching TV. \par \par Psychiatrist: Dr. Eric Yin ([facility])\par Urologist: Dr. Jung \par \par I-Stop Ref#: [id number]

## 2022-03-21 NOTE — PROGRESS NOTE ADULT - SUBJECTIVE AND OBJECTIVE BOX
Patient is a 56y old  Male who presents with a chief complaint of Rectal pain and bleeding, difficulty ambulating (20 Mar 2022 11:27)      SUBJECTIVE / OVERNIGHT EVENTS: No acute events overnight. Pt has no new complaints- still having rectal pain. Denies abdominal pain, N/V    ADDITIONAL REVIEW OF SYSTEMS:    MEDICATIONS  (STANDING):  atorvastatin 10 milliGRAM(s) Oral at bedtime  bictegravir 50 mG/emtricitabine 200 mG/tenofovir alafenamide 25 mG (BIKTARVY) 1 Tablet(s) Oral daily  chlorhexidine 2% Cloths 1 Application(s) Topical daily  gabapentin 200 milliGRAM(s) Oral <User Schedule>  gabapentin 300 milliGRAM(s) Oral at bedtime  methylnaltrexone Injectable 12 milliGRAM(s) SubCutaneous daily  metoprolol succinate ER 50 milliGRAM(s) Oral daily  morphine ER Tablet 60 milliGRAM(s) Oral every 8 hours  OLANZapine 10 milliGRAM(s) Oral at bedtime  piperacillin/tazobactam IVPB.. 3.375 Gram(s) IV Intermittent every 8 hours  polyethylene glycol 3350 17 Gram(s) Oral two times a day  psyllium Powder 1 Packet(s) Oral two times a day  senna 2 Tablet(s) Oral two times a day    MEDICATIONS  (PRN):  acetaminophen     Tablet .. 650 milliGRAM(s) Oral every 6 hours PRN Temp greater or equal to 38C (100.4F), Mild Pain (1 - 3)  ALPRAZolam 1 milliGRAM(s) Oral daily PRN anxiety  HYDROmorphone  Injectable 1.5 milliGRAM(s) IV Push every 3 hours PRN Severe Pain (7 - 10)  HYDROmorphone  Injectable 1 milliGRAM(s) IV Push every 3 hours PRN Moderate Pain (4 - 6)  zolpidem 5 milliGRAM(s) Oral at bedtime PRN Insomnia  zolpidem 5 milliGRAM(s) Oral at bedtime PRN Insomnia      CAPILLARY BLOOD GLUCOSE        I&O's Summary    21 Mar 2022 07:01  -  21 Mar 2022 13:51  --------------------------------------------------------  IN: 300 mL / OUT: 0 mL / NET: 300 mL        PHYSICAL EXAM:  Vital Signs Last 24 Hrs  T(C): 36.6 (21 Mar 2022 09:23), Max: 36.9 (20 Mar 2022 21:11)  T(F): 97.9 (21 Mar 2022 09:23), Max: 98.5 (20 Mar 2022 21:11)  HR: 100 (21 Mar 2022 09:23) (100 - 109)  BP: 138/92 (21 Mar 2022 09:23) (138/92 - 145/90)  BP(mean): --  RR: 17 (21 Mar 2022 09:23) (16 - 18)  SpO2: 98% (21 Mar 2022 09:23) (98% - 99%)  CONSTITUTIONAL: NAD, well-developed, well-groomed  EYES: PERRLA; conjunctiva and sclera clear  ENMT: Moist oral mucosa, no pharyngeal injection or exudates; normal dentition  NECK: Supple, no palpable masses; no thyromegaly  RESPIRATORY: Normal respiratory effort; lungs are clear to auscultation bilaterally  CARDIOVASCULAR: Regular rate and rhythm, normal S1 and S2, no murmur/rub/gallop; No lower extremity edema; Peripheral pulses are 2+ bilaterally  ABDOMEN: Nontender to palpation, normoactive bowel sounds, no rebound/guarding; No hepatosplenomegaly  MUSCULOSKELETAL:  Normal gait; no clubbing or cyanosis of digits; no joint swelling or tenderness to palpation  PSYCH: A+O to person, place, and time; affect appropriate  NEUROLOGY: CN 2-12 are intact and symmetric; no gross sensory deficits   SKIN: No rashes; no palpable lesions    LABS:                        10.7   7.53  )-----------( 388      ( 21 Mar 2022 06:39 )             33.6     03-21    140  |  104  |  10  ----------------------------<  110<H>  3.8   |  27  |  0.80    Ca    8.9      21 Mar 2022 06:39  Phos  4.0     03-21  Mg     2.50     03-21    TPro  6.9  /  Alb  3.3  /  TBili  0.3  /  DBili  x   /  AST  12  /  ALT  10  /  AlkPhos  68  03-20                RADIOLOGY & ADDITIONAL TESTS:  Results Reviewed:   Imaging Personally Reviewed:  Electrocardiogram Personally Reviewed:    COORDINATION OF CARE:  Care Discussed with Consultants/Other Providers [Y/N]:  Prior or Outpatient Records Reviewed [Y/N]:

## 2022-03-21 NOTE — PROGRESS NOTE ADULT - PROBLEM SELECTOR PLAN 1
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 MRI for better evaluation of fistula  -GI consult (emailed) for possible endoscopic management of radiation proctitis  -Palliative consult for pain management  - cw bowel regimen (home relistor now given iv)

## 2022-03-21 NOTE — PROGRESS NOTE ADULT - PROBLEM SELECTOR PLAN 1
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 MRI for better evaluation of fistula  -Palliative consult for pain management  - c/w bowel regimen (home relistor now given iv)

## 2022-03-21 NOTE — PROGRESS NOTE ADULT - PROBLEM SELECTOR PLAN 2
-when admitted, patient met SIRS criteria w/ tachycardia and tachypnea  -subjective fevers and chills at home, however afebrile in ED and per patient all measured temps at home <100  -no infectious symptoms  -received 1 dose vanc/zosyn in ED    Plan: given HIV and possible fistula would continue empiric antibiotics pending culture results with zosyn.  ID consulted  Assuming no new signs of infection, will DC abx if MRI negative

## 2022-03-21 NOTE — PROGRESS NOTE ADULT - SUBJECTIVE AND OBJECTIVE BOX
%%%%INCOMPLETE NOTE%%%%%     Dr. Cristy Izquierdo, PGY-1    Patient is a 56y old  Male who presents with a chief complaint of Rectal pain and bleeding, difficulty ambulating (20 Mar 2022 11:27)    Subjective: No acute events overnight. Patient seen and examined at bedside. Denies chest pain, abdominal pain, cough, n/v, sob. Breathing comfortably on room air.    MEDICATIONS  (STANDING):  atorvastatin 10 milliGRAM(s) Oral at bedtime  bictegravir 50 mG/emtricitabine 200 mG/tenofovir alafenamide 25 mG (BIKTARVY) 1 Tablet(s) Oral daily  chlorhexidine 2% Cloths 1 Application(s) Topical daily  gabapentin 200 milliGRAM(s) Oral <User Schedule>  gabapentin 300 milliGRAM(s) Oral at bedtime  methylnaltrexone Injectable 12 milliGRAM(s) SubCutaneous daily  metoprolol succinate ER 50 milliGRAM(s) Oral daily  morphine ER Tablet 60 milliGRAM(s) Oral every 8 hours  OLANZapine 10 milliGRAM(s) Oral at bedtime  piperacillin/tazobactam IVPB.. 3.375 Gram(s) IV Intermittent every 8 hours  polyethylene glycol 3350 17 Gram(s) Oral two times a day  psyllium Powder 1 Packet(s) Oral two times a day  senna 2 Tablet(s) Oral two times a day    MEDICATIONS  (PRN):  acetaminophen     Tablet .. 650 milliGRAM(s) Oral every 6 hours PRN Temp greater or equal to 38C (100.4F), Mild Pain (1 - 3)  ALPRAZolam 1 milliGRAM(s) Oral daily PRN anxiety  HYDROmorphone  Injectable 1.5 milliGRAM(s) IV Push every 3 hours PRN Severe Pain (7 - 10)  HYDROmorphone  Injectable 1 milliGRAM(s) IV Push every 3 hours PRN Moderate Pain (4 - 6)  zolpidem 5 milliGRAM(s) Oral at bedtime PRN Insomnia  zolpidem 5 milliGRAM(s) Oral at bedtime PRN Insomnia        Objective:     Vitals: Vital Signs Last 24 Hrs  T(C): 36.7 (03-21-22 @ 05:58), Max: 36.9 (03-20-22 @ 13:16)  T(F): 98 (03-21-22 @ 05:58), Max: 98.5 (03-20-22 @ 13:16)  HR: 103 (03-21-22 @ 05:58) (89 - 109)  BP: 145/90 (03-21-22 @ 05:58) (120/78 - 145/90)  BP(mean): --  RR: 16 (03-21-22 @ 05:58) (16 - 18)  SpO2: 98% (03-21-22 @ 05:58) (97% - 99%)            I&O's Summary      PHYSICAL EXAM:  GENERAL: NAD, well-developed, well-nourished  HEAD: Atraumatic, Normocephalic  EYES: EOMI, PERRLA, conjunctiva and sclera clear  NECK: Supple, No JVD  CHEST/LUNG: Clear to auscultation bilaterally; No wheezes or crackles  HEART: Normal S1/S2; Regular rate and rhythm; No murmurs, rubs, or gallops  ABDOMEN: Soft, Nontender, Nondistended; Bowel sounds present  EXTREMITIES: 2+ Peripheral Pulses; No clubbing, cyanosis, or edema  PSYCH: A&Ox3  NEUROLOGY: no focal neurologic deficit  SKIN: No rashes or lesions    LABS:                        10.7   7.53  )-----------( 388      ( 21 Mar 2022 06:39 )             33.6                         11.0   8.10  )-----------( 401      ( 20 Mar 2022 07:24 )             32.9                         10.6   7.40  )-----------( 393      ( 19 Mar 2022 05:34 )             31.5     Hgb Trend: 10.7<--, 11.0<--, 10.6<--, 10.6<--, 11.4<--  03-20    139  |  103  |  9   ----------------------------<  104<H>  3.8   |  25  |  0.85  03-19    139  |  103  |  8   ----------------------------<  117<H>  4.2   |  27  |  0.82  03-18    137  |  102  |  10  ----------------------------<  150<H>  3.8   |  25  |  0.68    Ca    9.3      20 Mar 2022 07:24  Ca    9.2      19 Mar 2022 05:34  Ca    9.1      18 Mar 2022 10:06    TPro  6.9  /  Alb  3.3  /  TBili  0.3  /  DBili  x   /  AST  12  /  ALT  10  /  AlkPhos  68  03-20  TPro  7.0  /  Alb  3.3  /  TBili  0.3  /  DBili  x   /  AST  14  /  ALT  11  /  AlkPhos  69  03-19  TPro  6.7  /  Alb  3.4  /  TBili  0.4  /  DBili  x   /  AST  14  /  ALT  9   /  AlkPhos  69  03-18    Creatinine Trend: 0.85<--, 0.82<--, 0.68<--, 0.82<--                    CAPILLARY BLOOD GLUCOSE

## 2022-03-21 NOTE — ASSESSMENT
[FreeTextEntry1] : 56yoM with:\par \par 1. Anal Cancer - s/p 5400cGy/ 15 fractions completed on 8/16/21 with Mitomycin Day #1. Xeloda discontinued 2/2 GI issues. Follow up with Med Onc, Rad Onc, wound care. \par \par 2. Rectal pain, acute - Pain remains uncontrolled.\par - Currently receiving HBOT.\par - Patient instructed to contact Dr. Manuel's office to follow-up.  Information provided.  \par - C/w MS ER 60mg TID, PRN Hydromorphone 4-8mg q4h PRN. Will alter Gabapentin to 200/200/300mg.\par - Co-prescribed intranasal narcan.\par - Appreciate pain management input re: nerve block. Patient to follow up once HBOT complete. \par \par 3. Rectal bleeding - Patient previously instructed to reach out to Dr. Avilez's office re: rectal bleeding. Re-recommended patent contact Dr. Avilez's office.  Will confer with Dr. Avilez regarding recommendations.   Labs pending. \par \par 4. Anxiety d/o - Follows closely with psychiatry. \par \par 5. Opioid-induced constipation -  C/w Relistor daily.\par \par 6. Encounter for Palliative Care - Emotional support provided.\par \par Follow up in 1 week, call sooner with questions or issues.

## 2022-03-21 NOTE — PROGRESS NOTE ADULT - PROBLEM SELECTOR PLAN 3
-acute on chronic gait instability over the past 3 days  -neuro exam benign, 5/5 in UE b/l, 5/5 strength in RLE, and 4/5 strength in LLE which he states is his baseline  -usually ambulates without assistance, now is using cane  -more likely 2/2 anal pain radiating down legs, less likely due to other causes such as new mets    Plan: PT alaina, monitor pending pain control

## 2022-03-22 ENCOUNTER — APPOINTMENT (OUTPATIENT)
Dept: HYPERBARIC MEDICINE | Facility: CLINIC | Age: 57
End: 2022-03-22

## 2022-03-22 LAB
ALBUMIN SERPL ELPH-MCNC: 3.4 G/DL — SIGNIFICANT CHANGE UP (ref 3.3–5)
ALP SERPL-CCNC: 70 U/L — SIGNIFICANT CHANGE UP (ref 40–120)
ALT FLD-CCNC: 13 U/L — SIGNIFICANT CHANGE UP (ref 4–41)
ANION GAP SERPL CALC-SCNC: 10 MMOL/L — SIGNIFICANT CHANGE UP (ref 7–14)
AST SERPL-CCNC: 15 U/L — SIGNIFICANT CHANGE UP (ref 4–40)
BASOPHILS # BLD AUTO: 0.03 K/UL — SIGNIFICANT CHANGE UP (ref 0–0.2)
BASOPHILS NFR BLD AUTO: 0.4 % — SIGNIFICANT CHANGE UP (ref 0–2)
BILIRUB SERPL-MCNC: 0.2 MG/DL — SIGNIFICANT CHANGE UP (ref 0.2–1.2)
BLD GP AB SCN SERPL QL: NEGATIVE — SIGNIFICANT CHANGE UP
BUN SERPL-MCNC: 10 MG/DL — SIGNIFICANT CHANGE UP (ref 7–23)
CALCIUM SERPL-MCNC: 8.8 MG/DL — SIGNIFICANT CHANGE UP (ref 8.4–10.5)
CHLORIDE SERPL-SCNC: 102 MMOL/L — SIGNIFICANT CHANGE UP (ref 98–107)
CO2 SERPL-SCNC: 26 MMOL/L — SIGNIFICANT CHANGE UP (ref 22–31)
CREAT SERPL-MCNC: 0.88 MG/DL — SIGNIFICANT CHANGE UP (ref 0.5–1.3)
CULTURE RESULTS: SIGNIFICANT CHANGE UP
CULTURE RESULTS: SIGNIFICANT CHANGE UP
EGFR: 102 ML/MIN/1.73M2 — SIGNIFICANT CHANGE UP
EOSINOPHIL # BLD AUTO: 0.03 K/UL — SIGNIFICANT CHANGE UP (ref 0–0.5)
EOSINOPHIL NFR BLD AUTO: 0.4 % — SIGNIFICANT CHANGE UP (ref 0–6)
GLUCOSE SERPL-MCNC: 101 MG/DL — HIGH (ref 70–99)
HCT VFR BLD CALC: 33 % — LOW (ref 39–50)
HGB BLD-MCNC: 10.7 G/DL — LOW (ref 13–17)
IANC: 5.2 K/UL — SIGNIFICANT CHANGE UP (ref 1.5–8.5)
IMM GRANULOCYTES NFR BLD AUTO: 0.6 % — SIGNIFICANT CHANGE UP (ref 0–1.5)
LYMPHOCYTES # BLD AUTO: 1.54 K/UL — SIGNIFICANT CHANGE UP (ref 1–3.3)
LYMPHOCYTES # BLD AUTO: 19.5 % — SIGNIFICANT CHANGE UP (ref 13–44)
MAGNESIUM SERPL-MCNC: 2.3 MG/DL — SIGNIFICANT CHANGE UP (ref 1.6–2.6)
MCHC RBC-ENTMCNC: 28.2 PG — SIGNIFICANT CHANGE UP (ref 27–34)
MCHC RBC-ENTMCNC: 32.4 GM/DL — SIGNIFICANT CHANGE UP (ref 32–36)
MCV RBC AUTO: 86.8 FL — SIGNIFICANT CHANGE UP (ref 80–100)
MONOCYTES # BLD AUTO: 1.04 K/UL — HIGH (ref 0–0.9)
MONOCYTES NFR BLD AUTO: 13.2 % — SIGNIFICANT CHANGE UP (ref 2–14)
NEUTROPHILS # BLD AUTO: 5.2 K/UL — SIGNIFICANT CHANGE UP (ref 1.8–7.4)
NEUTROPHILS NFR BLD AUTO: 65.9 % — SIGNIFICANT CHANGE UP (ref 43–77)
NRBC # BLD: 0 /100 WBCS — SIGNIFICANT CHANGE UP
NRBC # FLD: 0 K/UL — SIGNIFICANT CHANGE UP
PHOSPHATE SERPL-MCNC: 4.1 MG/DL — SIGNIFICANT CHANGE UP (ref 2.5–4.5)
PLATELET # BLD AUTO: 398 K/UL — SIGNIFICANT CHANGE UP (ref 150–400)
POTASSIUM SERPL-MCNC: 3.5 MMOL/L — SIGNIFICANT CHANGE UP (ref 3.5–5.3)
POTASSIUM SERPL-SCNC: 3.5 MMOL/L — SIGNIFICANT CHANGE UP (ref 3.5–5.3)
PROT SERPL-MCNC: 6.9 G/DL — SIGNIFICANT CHANGE UP (ref 6–8.3)
RBC # BLD: 3.8 M/UL — LOW (ref 4.2–5.8)
RBC # FLD: 13.5 % — SIGNIFICANT CHANGE UP (ref 10.3–14.5)
RH IG SCN BLD-IMP: NEGATIVE — SIGNIFICANT CHANGE UP
SODIUM SERPL-SCNC: 138 MMOL/L — SIGNIFICANT CHANGE UP (ref 135–145)
SPECIMEN SOURCE: SIGNIFICANT CHANGE UP
SPECIMEN SOURCE: SIGNIFICANT CHANGE UP
WBC # BLD: 7.89 K/UL — SIGNIFICANT CHANGE UP (ref 3.8–10.5)
WBC # FLD AUTO: 7.89 K/UL — SIGNIFICANT CHANGE UP (ref 3.8–10.5)

## 2022-03-22 PROCEDURE — 99233 SBSQ HOSP IP/OBS HIGH 50: CPT

## 2022-03-22 PROCEDURE — 99232 SBSQ HOSP IP/OBS MODERATE 35: CPT

## 2022-03-22 RX ORDER — GABAPENTIN 400 MG/1
300 CAPSULE ORAL
Refills: 0 | Status: DISCONTINUED | OUTPATIENT
Start: 2022-03-22 | End: 2022-03-30

## 2022-03-22 RX ORDER — HYDROMORPHONE HYDROCHLORIDE 2 MG/ML
2 INJECTION INTRAMUSCULAR; INTRAVENOUS; SUBCUTANEOUS
Refills: 0 | Status: DISCONTINUED | OUTPATIENT
Start: 2022-03-22 | End: 2022-03-24

## 2022-03-22 RX ADMIN — GABAPENTIN 200 MILLIGRAM(S): 400 CAPSULE ORAL at 09:04

## 2022-03-22 RX ADMIN — HYDROMORPHONE HYDROCHLORIDE 2 MILLIGRAM(S): 2 INJECTION INTRAMUSCULAR; INTRAVENOUS; SUBCUTANEOUS at 22:10

## 2022-03-22 RX ADMIN — PIPERACILLIN AND TAZOBACTAM 25 GRAM(S): 4; .5 INJECTION, POWDER, LYOPHILIZED, FOR SOLUTION INTRAVENOUS at 07:56

## 2022-03-22 RX ADMIN — MORPHINE SULFATE 60 MILLIGRAM(S): 50 CAPSULE, EXTENDED RELEASE ORAL at 22:12

## 2022-03-22 RX ADMIN — HYDROMORPHONE HYDROCHLORIDE 1.5 MILLIGRAM(S): 2 INJECTION INTRAMUSCULAR; INTRAVENOUS; SUBCUTANEOUS at 02:31

## 2022-03-22 RX ADMIN — HYDROMORPHONE HYDROCHLORIDE 2 MILLIGRAM(S): 2 INJECTION INTRAMUSCULAR; INTRAVENOUS; SUBCUTANEOUS at 18:00

## 2022-03-22 RX ADMIN — HYDROMORPHONE HYDROCHLORIDE 2 MILLIGRAM(S): 2 INJECTION INTRAMUSCULAR; INTRAVENOUS; SUBCUTANEOUS at 22:25

## 2022-03-22 RX ADMIN — HYDROMORPHONE HYDROCHLORIDE 2 MILLIGRAM(S): 2 INJECTION INTRAMUSCULAR; INTRAVENOUS; SUBCUTANEOUS at 14:06

## 2022-03-22 RX ADMIN — GABAPENTIN 300 MILLIGRAM(S): 400 CAPSULE ORAL at 22:12

## 2022-03-22 RX ADMIN — ATORVASTATIN CALCIUM 10 MILLIGRAM(S): 80 TABLET, FILM COATED ORAL at 22:12

## 2022-03-22 RX ADMIN — MORPHINE SULFATE 60 MILLIGRAM(S): 50 CAPSULE, EXTENDED RELEASE ORAL at 05:37

## 2022-03-22 RX ADMIN — Medication 50 MILLIGRAM(S): at 05:40

## 2022-03-22 RX ADMIN — HYDROMORPHONE HYDROCHLORIDE 1.5 MILLIGRAM(S): 2 INJECTION INTRAMUSCULAR; INTRAVENOUS; SUBCUTANEOUS at 02:17

## 2022-03-22 RX ADMIN — OLANZAPINE 10 MILLIGRAM(S): 15 TABLET, FILM COATED ORAL at 22:12

## 2022-03-22 RX ADMIN — MORPHINE SULFATE 60 MILLIGRAM(S): 50 CAPSULE, EXTENDED RELEASE ORAL at 14:45

## 2022-03-22 RX ADMIN — ZOLPIDEM TARTRATE 5 MILLIGRAM(S): 10 TABLET ORAL at 22:21

## 2022-03-22 RX ADMIN — MORPHINE SULFATE 60 MILLIGRAM(S): 50 CAPSULE, EXTENDED RELEASE ORAL at 23:12

## 2022-03-22 RX ADMIN — HYDROMORPHONE HYDROCHLORIDE 2 MILLIGRAM(S): 2 INJECTION INTRAMUSCULAR; INTRAVENOUS; SUBCUTANEOUS at 10:15

## 2022-03-22 RX ADMIN — HYDROMORPHONE HYDROCHLORIDE 2 MILLIGRAM(S): 2 INJECTION INTRAMUSCULAR; INTRAVENOUS; SUBCUTANEOUS at 10:46

## 2022-03-22 RX ADMIN — HYDROMORPHONE HYDROCHLORIDE 1.5 MILLIGRAM(S): 2 INJECTION INTRAMUSCULAR; INTRAVENOUS; SUBCUTANEOUS at 09:19

## 2022-03-22 RX ADMIN — HYDROMORPHONE HYDROCHLORIDE 2 MILLIGRAM(S): 2 INJECTION INTRAMUSCULAR; INTRAVENOUS; SUBCUTANEOUS at 17:30

## 2022-03-22 RX ADMIN — GABAPENTIN 300 MILLIGRAM(S): 400 CAPSULE ORAL at 13:26

## 2022-03-22 RX ADMIN — METHYLNALTREXONE BROMIDE 12 MILLIGRAM(S): 12 INJECTION, SOLUTION SUBCUTANEOUS at 13:26

## 2022-03-22 RX ADMIN — HYDROMORPHONE HYDROCHLORIDE 1.5 MILLIGRAM(S): 2 INJECTION INTRAMUSCULAR; INTRAVENOUS; SUBCUTANEOUS at 09:40

## 2022-03-22 RX ADMIN — PIPERACILLIN AND TAZOBACTAM 25 GRAM(S): 4; .5 INJECTION, POWDER, LYOPHILIZED, FOR SOLUTION INTRAVENOUS at 17:32

## 2022-03-22 RX ADMIN — MORPHINE SULFATE 60 MILLIGRAM(S): 50 CAPSULE, EXTENDED RELEASE ORAL at 14:13

## 2022-03-22 RX ADMIN — BICTEGRAVIR SODIUM, EMTRICITABINE, AND TENOFOVIR ALAFENAMIDE FUMARATE 1 TABLET(S): 30; 120; 15 TABLET ORAL at 05:41

## 2022-03-22 RX ADMIN — HYDROMORPHONE HYDROCHLORIDE 2 MILLIGRAM(S): 2 INJECTION INTRAMUSCULAR; INTRAVENOUS; SUBCUTANEOUS at 14:45

## 2022-03-22 RX ADMIN — CHLORHEXIDINE GLUCONATE 1 APPLICATION(S): 213 SOLUTION TOPICAL at 11:40

## 2022-03-22 NOTE — PROGRESS NOTE ADULT - SUBJECTIVE AND OBJECTIVE BOX
[facility] Geriatrics and Palliative Care  Terri Roberts Palliative Care Attending  Contact Info: Page [phone] (including Nights/Weekends), message on Microsoft Teams (Terri Roberts), or leave VM at Palliative Office [phone] (non-urgent)     SUBJECTIVE AND OBJECTIVE:  INTERVAL HPI/OVERNIGHT EVENTS:    DNR on chart:   Allergies    No Known Allergies    Intolerances    MEDICATIONS  (STANDING):  atorvastatin 10 milliGRAM(s) Oral at bedtime  bictegravir 50 mG/emtricitabine 200 mG/tenofovir alafenamide 25 mG (BIKTARVY) 1 Tablet(s) Oral daily  chlorhexidine 2% Cloths 1 Application(s) Topical daily  gabapentin 200 milliGRAM(s) Oral <User Schedule>  gabapentin 300 milliGRAM(s) Oral at bedtime  methylnaltrexone Injectable 12 milliGRAM(s) SubCutaneous daily  metoprolol succinate ER 50 milliGRAM(s) Oral daily  morphine ER Tablet 60 milliGRAM(s) Oral every 8 hours  OLANZapine 10 milliGRAM(s) Oral at bedtime  piperacillin/tazobactam IVPB.. 3.375 Gram(s) IV Intermittent every 8 hours  polyethylene glycol 3350 17 Gram(s) Oral two times a day  psyllium Powder 1 Packet(s) Oral two times a day  senna 2 Tablet(s) Oral two times a day    MEDICATIONS  (PRN):  acetaminophen     Tablet .. 650 milliGRAM(s) Oral every 6 hours PRN Temp greater or equal to 38C (100.4F), Mild Pain (1 - 3)  ALPRAZolam 1 milliGRAM(s) Oral daily PRN anxiety  HYDROmorphone  Injectable 1 milliGRAM(s) IV Push every 3 hours PRN Moderate Pain (4 - 6)  HYDROmorphone  Injectable 2 milliGRAM(s) IV Push every 3 hours PRN Severe Pain (7 - 10)  zolpidem 5 milliGRAM(s) Oral at bedtime PRN Insomnia  zolpidem 5 milliGRAM(s) Oral at bedtime PRN Insomnia      ITEMS UNCHECKED ARE NOT PRESENT    PRESENT SYMPTOMS: [ ]Unable to obtain due to poor mentation   Source if other than patient:  [ ]Family   [ ]Team     Pain:  [ ]yes [ ]no  QOL impact -   Location -                    Aggravating factors -  Quality -  Radiation -  Timing-  Severity (0-10 scale):  Minimal acceptable level (0-10 scale):     Dyspnea:                           [ ]Mild [ ]Moderate [ ]Severe  Anxiety:                             [ ]Mild [ ]Moderate [ ]Severe  Fatigue:                             [ ]Mild [ ]Moderate [ ]Severe  Nausea:                             [ ]Mild [ ]Moderate [ ]Severe  Loss of appetite:              [ ]Mild [ ]Moderate [ ]Severe  Constipation:                    [ ]Mild [ ]Moderate [ ]Severe    PAIN AD Score:	  [email or web address]/[email or web address] (Ctrl + left click to view)    Other Symptoms:  [ ]All other review of systems negative     Palliative Performance Status Version 2:         %      [email or web address]/files/[email or web address]_[email or web address]  PHYSICAL EXAM:  Vital Signs Last 24 Hrs  T(C): 37.1 (22 Mar 2022 05:43), Max: 37.1 (22 Mar 2022 00:14)  T(F): 98.7 (22 Mar 2022 05:43), Max: 98.7 (22 Mar 2022 00:14)  HR: 103 (22 Mar 2022 05:43) (95 - 103)  BP: 121/78 (22 Mar 2022 05:43) (121/78 - 146/87)  BP(mean): --  RR: 16 (22 Mar 2022 05:43) (16 - 18)  SpO2: 96% (22 Mar 2022 05:43) (96% - 98%) I&O's Summary    21 Mar 2022 07:01  -  22 Mar 2022 07:00  --------------------------------------------------------  IN: 900 mL / OUT: 0 mL / NET: 900 mL    22 Mar 2022 07:01  -  22 Mar 2022 11:06  --------------------------------------------------------  IN: 300 mL / OUT: 0 mL / NET: 300 mL       GENERAL:  [ ]Alert  [ ]Oriented x   [ ]Lethargic  [ ]Cachexia  [ ]Unarousable  [ ]Verbal  [ ]Non-Verbal  Behavioral:   [ ]Anxiety  [ ]Delirium [ ]Agitation [ ]Other  HEENT:  [ ]Normal   [ ]Dry mouth   [ ]ET Tube/Trach  [ ]Oral lesions  PULMONARY:   [ ]Clear [ ]Tachypnea  [ ]Audible excessive secretions   [ ]Rhonchi        [ ]Right [ ]Left [ ]Bilateral  [ ]Crackles        [ ]Right [ ]Left [ ]Bilateral  [ ]Wheezing     [ ]Right [ ]Left [ ]Bilateral  [ ]Diminished BS [ ] Right [ ]Left [ ]Bilateral  CARDIOVASCULAR:    [ ]Regular [ ]Irregular [ ]Tachy  [ ]Vladimir [ ]Murmur [ ]Other  GASTROINTESTINAL:  [ ]Soft  [ ]Distended   [ ]+BS  [ ]Non tender [ ]Tender  [ ]PEG [ ]OGT/ NGT   Last BM:    GENITOURINARY:  [ ]Normal [ ]Incontinent   [ ]Oliguria/Anuria   [ ]Sahu  MUSCULOSKELETAL:   [ ]Normal   [ ]Weakness  [ ]Bed/Wheelchair bound [ ]Edema  NEUROLOGIC:   [ ]No focal deficits  [ ] Cognitive impairment  [ ] Dysphagia [ ]Dysarthria [ ] Paresis [ ]Other   SKIN:   [ ]Normal  [ ]Rash   [ ]Pressure ulcer(s) [ ]y [ ]n present on admission    CRITICAL CARE:  [ ]Shock Present  [ ]Septic [ ]Cardiogenic [ ]Neurologic [ ]Hypovolemic  [ ]Vasopressors [ ]Inotropes  [ ]Respiratory failure present [ ]Mechanical Ventilation [ ]Non-invasive ventilatory support [ ]High-Flow  [ ]Acute  [ ]Chronic [ ]Hypoxic  [ ]Hypercarbic [ ]Other  [ ]Other organ failure     LABS:                        10.7   7.89  )-----------( 398      ( 22 Mar 2022 06:57 )             33.0   03-22    138  |  102  |  10  ----------------------------<  101<H>  3.5   |  26  |  0.88    Ca    8.8      22 Mar 2022 06:57  Phos  4.1     03-22  Mg     2.30     03-22    TPro  6.9  /  Alb  3.4  /  TBili  0.2  /  DBili  x   /  AST  15  /  ALT  13  /  AlkPhos  70  03-22        RADIOLOGY & ADDITIONAL STUDIES:    Protein Calorie Malnutrition Present: [ ]mild [ ]moderate [ ]severe [ ]underweight [ ]morbid obesity  https://www.andeal.org/vault/2440/web/files/ONC/Table_Clinical%20Characteristics%20to%20Document%20Malnutrition-White%20JV%20et%20al%202012.pdf    Height (cm): 182.9 (03-18-22 @ 00:12), 180.3 (11-26-21 @ 06:50), 180.3 (11-24-21 @ 10:02)  Weight (kg): 95 (03-18-22 @ 00:12), 101.6 (11-26-21 @ 06:50), 101.6 (11-24-21 @ 10:02)  BMI (kg/m2): 28.4 (03-18-22 @ 00:12), 31.3 (11-26-21 @ 06:50), 31.3 (11-24-21 @ 10:02)    [ ]PPSV2 < or = 30%  [ ]significant weight loss [ ]poor nutritional intake [ ]anasarca    [ ]Artificial Nutrition    REFERRALS:   [ ]Chaplaincy  [ ]Hospice  [ ]Child Life  [ ]Social Work  [ ]Case management [ ]Holistic Therapy      [facility] Geriatrics and Palliative Care  Terri Roberts, Palliative Care Attending  Contact Info: Page [phone] (including Nights/Weekends), message on Microsoft Teams (Terri Roberts), or leave  at Palliative Office [phone] (non-urgent)     SUBJECTIVE AND OBJECTIVE: Patient seen this morning sitting in recliner as his bed was being changed. He appeared uncomfortable from the rectal pain and reports that it is painful to sit and ambulate. He had MRI this AM and is awaiting results.     INTERVAL HPI/OVERNIGHT EVENTS:  > 3/22: Over the past 24 hours, patient required PRNs of IV dilaudid 1.5mg x5, IV dilaudid 1mg x1, and ambien 5mg x1.     DNR on chart:   Allergies    No Known Allergies    Intolerances    MEDICATIONS  (STANDING):  atorvastatin 10 milliGRAM(s) Oral at bedtime  bictegravir 50 mG/emtricitabine 200 mG/tenofovir alafenamide 25 mG (BIKTARVY) 1 Tablet(s) Oral daily  chlorhexidine 2% Cloths 1 Application(s) Topical daily  gabapentin 200 milliGRAM(s) Oral <User Schedule>  gabapentin 300 milliGRAM(s) Oral at bedtime  methylnaltrexone Injectable 12 milliGRAM(s) SubCutaneous daily  metoprolol succinate ER 50 milliGRAM(s) Oral daily  morphine ER Tablet 60 milliGRAM(s) Oral every 8 hours  OLANZapine 10 milliGRAM(s) Oral at bedtime  piperacillin/tazobactam IVPB.. 3.375 Gram(s) IV Intermittent every 8 hours  polyethylene glycol 3350 17 Gram(s) Oral two times a day  psyllium Powder 1 Packet(s) Oral two times a day  senna 2 Tablet(s) Oral two times a day    MEDICATIONS  (PRN):  acetaminophen     Tablet .. 650 milliGRAM(s) Oral every 6 hours PRN Temp greater or equal to 38C (100.4F), Mild Pain (1 - 3)  ALPRAZolam 1 milliGRAM(s) Oral daily PRN anxiety  HYDROmorphone  Injectable 1 milliGRAM(s) IV Push every 3 hours PRN Moderate Pain (4 - 6)  HYDROmorphone  Injectable 2 milliGRAM(s) IV Push every 3 hours PRN Severe Pain (7 - 10)  zolpidem 5 milliGRAM(s) Oral at bedtime PRN Insomnia  zolpidem 5 milliGRAM(s) Oral at bedtime PRN Insomnia      ITEMS UNCHECKED ARE NOT PRESENT    PRESENT SYMPTOMS: [ ]Unable to obtain due to poor mentation   Source if other than patient:  [ ]Family   [ ]Team     Pain: [x ]yes [ ]no  QOL impact - difficulty ambulating   Location -    rectal pain, b/l leg pain                Aggravating factors - pain is constant, no pattern identified   Quality - sharp and stabbing   Radiation - rectal pain radiates to anterior groin   Timing- constant   Severity (0-10 scale): 9  Minimal acceptable level (0-10 scale): 3    Dyspnea:                           [ ]Mild [ ]Moderate [ ]Severe  Anxiety:                             [ ]Mild [ ]Moderate [ ]Severe  Fatigue:                             [ ]Mild [ ]Moderate [ ]Severe  Nausea:                             [ ]Mild [ ]Moderate [ ]Severe  Loss of appetite:              [ ]Mild [ ]Moderate [ ]Severe  Constipation:                    [ ]Mild [x ]Moderate [ ]Severe    PAIN AD Score:	  [email or web address]/cog/painad.pdf (Ctrl + left click to view)    Other Symptoms: difficulty sitting, ambulating   [ x]All other review of systems negative     Palliative Performance Status Version 2:     70    %      http://npcrc.org/files/news/palliative_performance_scale_[email or web address].pdf  PHYSICAL EXAM:  Vital Signs Last 24 Hrs  T(C): 37.1 (22 Mar 2022 05:43), Max: 37.1 (22 Mar 2022 00:14)  T(F): 98.7 (22 Mar 2022 05:43), Max: 98.7 (22 Mar 2022 00:14)  HR: 103 (22 Mar 2022 05:43) (95 - 103)  BP: 121/78 (22 Mar 2022 05:43) (121/78 - 146/87)  BP(mean): --  RR: 16 (22 Mar 2022 05:43) (16 - 18)  SpO2: 96% (22 Mar 2022 05:43) (96% - 98%) I&O's Summary    21 Mar 2022 07:01  -  22 Mar 2022 07:00  --------------------------------------------------------  IN: 900 mL / OUT: 0 mL / NET: 900 mL    22 Mar 2022 07:01  -  22 Mar 2022 11:06  --------------------------------------------------------  IN: 300 mL / OUT: 0 mL / NET: 300 mL       GENERAL:  [x ]Alert  [x ]Oriented x4   [ ]Lethargic  [ ]Cachexia  [ ]Unarousable  [x ]Verbal  [ ]Non-Verbal  Behavioral:   [ ] Anxiety  [ ] Delirium [ ] Agitation [x ] Other  HEENT:  [ x]Normal   [ ]Dry mouth   [ ]ET Tube/Trach  [ ]Oral lesions  PULMONARY:   [x ]Clear [ ]Tachypnea  [ ]Audible excessive secretions   [ ]Rhonchi        [ ]Right [ ]Left [ ]Bilateral  [ ]Crackles        [ ]Right [ ]Left [ ]Bilateral  [ ]Wheezing     [ ]Right [ ]Left [ ]Bilateral  [ ]Diminished breath sounds [ ]right [ ]left [ ]bilateral  CARDIOVASCULAR:    [ ]Regular [ ]Irregular [x ]Tachy  [ ]Vladimir [ ]Murmur [ ]Other  GASTROINTESTINAL:  [ x]Soft  [ ]Distended   [ ]+BS  [ ]Non tender [ ]Tender  [ ]PEG [ ]OGT/ NGT  Last BM: 3/18  GENITOURINARY: Uniformly surrounded by erythematous indurated skin, no fluctuance, yellow drainage around anal verge [ [ [ [ ]Normal [ ] Incontinent   [ ]Oliguria/Anuria   [ ]Sahu  MUSCULOSKELETAL:   [ x]Normal   [ ]Weakness  [ ]Bed/Wheelchair bound [ ]Edema  NEUROLOGIC:   [x ]No focal deficits  [ ]Cognitive impairment  [ ]Dysphagia [ ]Dysarthria [ ]Paresis [ ]Other   SKIN: please see flowsheets   [ ]Normal    [ ]Rash  [ ]Pressure ulcer(s)       Present on admission [ ]y [ ]n    CRITICAL CARE:  [ ] Shock Present  [ ]Septic [ ]Cardiogenic [ ]Neurologic [ ]Hypovolemic  [ ]  Vasopressors [ ]  Inotropes   [ ]Respiratory failure present [ ]Mechanical ventilation [ ]Non-invasive ventilatory support [ ]High flow  [ ]Acute  [ ]Chronic [ ]Hypoxic  [ ]Hypercarbic [ ]Other  [ ]Other organ failure     LABS:                        10.7   7.89  )-----------( 398      ( 22 Mar 2022 06:57 )             33.0   03-22    138  |  102  |  10  ----------------------------<  101<H>  3.5   |  26  |  0.88    Ca    8.8      22 Mar 2022 06:57  Phos  4.1     03-22  Mg     2.30     03-22    TPro  6.9  /  Alb  3.4  /  TBili  0.2  /  DBili  x   /  AST  15  /  ALT  13  /  AlkPhos  70  03-22        RADIOLOGY & ADDITIONAL STUDIES: < from: MR Lumbar Spine w/wo IV Cont (03.21.22 @ 09:11) >  FINDINGS:  VERTEBRAL BODIES AND DISCS:  Normal in height. Scattered Schmorl's nodes.   No abnormal enhancement. Increased T1 signal is seen involving L5 and the   visualized sacrum which may be secondary to prior radiation.  ALIGNMENT:  No subluxations.  L1-L2 LEVEL:  Normal.  L2-L3 LEVEL:  Normal.  L3-L4 LEVEL:  Mild disc bulge. Patent neural foramina  L4-L5 LEVEL:  Diffuse disc bulge with bilateral facet arthrosis. Mild   foraminal narrowing more prominent on the right. Mild to moderate   combined degenerative/developmental spinal stenosis.  L5-S1 LEVEL:  Mild disc bulge more prominent on the left. Facet arthrosis   with mild foraminal stenosis.  SPINAL CANAL:  No other intradural or extradural defects are seen. No   abnormal enhancement.  CONUS MEDULLARIS:  Normal. Terminates at the L1 level  MISCELLANEOUS:  Bilateral renal cysts      IMPRESSION:  Disc bulges with L4-L5 mild to moderate spinal stenosis.    < end of copied text >      Protein Calorie Malnutrition Present: [x ]mild per dietary recs (3/20) [ ]moderate [ ]severe [ ]underweight [ ]morbid obesity  https://www.andeal.org/vault/2440/web/files/ONC/Table_Clinical%20Characteristics%20to%20Document%20Malnutrition-White%20JV%20et%20al%202012.pdf    Height (cm): 182.9 (03-18-22 @ 00:12), 180.3 (11-26-21 @ 06:50), 180.3 (11-24-21 @ 10:02)  Weight (kg): 95 (03-18-22 @ 00:12), 101.6 (11-26-21 @ 06:50), 101.6 (11-24-21 @ 10:02)  BMI (kg/m2): 28.4 (03-18-22 @ 00:12), 31.3 (11-26-21 @ 06:50), 31.3 (11-24-21 @ 10:02)    [ ]PPSV2 < or = 30%  [ ]significant weight loss [ ]poor nutritional intake [ ]anasarca    [ ]Artificial Nutrition    REFERRALS:   [ ]Chaplaincy  [ ]Hospice  [ ]Child Life  [ ]Social Work  [ ]Case management [ ]Holistic Therapy

## 2022-03-22 NOTE — PROGRESS NOTE ADULT - ASSESSMENT
56yM w/ Possible perianal fistula    Recommendations:  - c/w current medical management  - Will review MRI  - No acute surgical intervention at this time    - Please page [phone] w/ any questions

## 2022-03-22 NOTE — PROGRESS NOTE ADULT - PROBLEM SELECTOR PLAN 5
PMH anal cancer (dx 4/2021) s/p radiation and chemo (last 8/2021) complicated by an ulcer  > Pt completed radiation and chemo (8/2021) at [facility].

## 2022-03-22 NOTE — PROGRESS NOTE ADULT - PROBLEM SELECTOR PLAN 1
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 f/u MRI read  -Palliative consult for pain management  - c/w bowel regimen (home relistor now given iv)

## 2022-03-22 NOTE — PROGRESS NOTE ADULT - PROBLEM SELECTOR PLAN 1
Patient follows with outpatient palliative team: Dr. Colon and Neena Borrego at [facility].   > At home, patient on MS Contin 60mg q8hrs and PO dilaudid 4-8mg q4h prn. He was receiving hyperbaric oxygen treatments outpatient.   > Continue MS Contin 60mg tid   > On IV dilaudid 1mg q3 hr prn moderate pain   > increase severe PRN to 2mg IV dilaudid q3 hr on 3/22   > added sitz baths   > increased gabapentin to 300mg tid (9am, 1pm, 9pm) on 3/22   > Pt may benefit from nerve block as outpatient for improved symptom control but he was receiving HBOT.

## 2022-03-22 NOTE — PROGRESS NOTE ADULT - PROBLEM SELECTOR PLAN 3
-acute on chronic gait instability over the past 3 days  -neuro exam benign, 5/5 in UE b/l, 5/5 strength in RLE, and 4/5 strength in LLE which he states is his baseline  -usually ambulates without assistance, now is using cane  -more likely 2/2 anal pain radiating down legs, less likely due to other causes such as new mets    Plan: PT eval rec home PT

## 2022-03-22 NOTE — PROGRESS NOTE ADULT - SUBJECTIVE AND OBJECTIVE BOX
A Team Progress Note  [id number]    24h Events:   - MRI performed    Subjective:   Patient seen at bedside this AM. Reports pain is stable, still having trouble sitting/walking, getting dilaudid 1.5mg prn, acetaminophen. Blood on toilet paper. +/+ bowel function, no n/v, no f/n/c.    Objective:  Vital Signs  T(C): 37.1 (03-22 @ 05:43), Max: 37.1 (03-22 @ 00:14)  HR: 103 (03-22 @ 05:43) (95 - 103)  BP: 121/78 (03-22 @ 05:43) (121/78 - 146/87)  RR: 16 (03-22 @ 05:43) (16 - 18)  SpO2: 96% (03-22 @ 05:43) (96% - 98%)      I&O's Detail    03-21-22 @ 07:01  -  03-22-22 @ 07:00  --------------------------------------------------------  IN: 900 mL / OUT: 0 mL / NET: 900 mL        Physical Exam:  GEN: resting in bed comfortably in NAD  RESP: no increased WOB  ABD: soft, non-distended, non-tender  EXTR: warm, well-perfused    Labs:                        10.7   7.89  )-----------( 398      ( 22 Mar 2022 06:57 )             33.0   03-22    138  |  102  |  10  ----------------------------<  101<H>  3.5   |  26  |  0.88    Ca    8.8      22 Mar 2022 06:57  Phos  4.1     03-22  Mg     2.30     03-22    TPro  6.9  /  Alb  3.4  /  TBili  0.2  /  DBili  x   /  AST  15  /  ALT  13  /  AlkPhos  70  03-22    CAPILLARY BLOOD GLUCOSE          Medications:   MEDICATIONS  (STANDING):  atorvastatin 10 milliGRAM(s) Oral at bedtime  bictegravir 50 mG/emtricitabine 200 mG/tenofovir alafenamide 25 mG (BIKTARVY) 1 Tablet(s) Oral daily  chlorhexidine 2% Cloths 1 Application(s) Topical daily  gabapentin 200 milliGRAM(s) Oral <User Schedule>  gabapentin 300 milliGRAM(s) Oral at bedtime  methylnaltrexone Injectable 12 milliGRAM(s) SubCutaneous daily  metoprolol succinate ER 50 milliGRAM(s) Oral daily  morphine ER Tablet 60 milliGRAM(s) Oral every 8 hours  OLANZapine 10 milliGRAM(s) Oral at bedtime  piperacillin/tazobactam IVPB.. 3.375 Gram(s) IV Intermittent every 8 hours  polyethylene glycol 3350 17 Gram(s) Oral two times a day  psyllium Powder 1 Packet(s) Oral two times a day  senna 2 Tablet(s) Oral two times a day    MEDICATIONS  (PRN):  acetaminophen     Tablet .. 650 milliGRAM(s) Oral every 6 hours PRN Temp greater or equal to 38C (100.4F), Mild Pain (1 - 3)  ALPRAZolam 1 milliGRAM(s) Oral daily PRN anxiety  HYDROmorphone  Injectable 1.5 milliGRAM(s) IV Push every 3 hours PRN Severe Pain (7 - 10)  HYDROmorphone  Injectable 1 milliGRAM(s) IV Push every 3 hours PRN Moderate Pain (4 - 6)  zolpidem 5 milliGRAM(s) Oral at bedtime PRN Insomnia  zolpidem 5 milliGRAM(s) Oral at bedtime PRN Insomnia      Imaging:

## 2022-03-22 NOTE — PROGRESS NOTE ADULT - PROBLEM SELECTOR PLAN 3
Patient with increasing difficulty walking. He requires assistance with ADLs  > Please assist with ADLs  > fall precautions.  > pain control as above

## 2022-03-22 NOTE — PROGRESS NOTE ADULT - NSPROGADDITIONALINFOA_GEN_ALL_CORE
3/22/22 3:30pm - Tried calling patient's wife Trinity to give updates (patient gave verbal consent to discuss his medical care in full) - no answer and left voicemail. Pt informed me that his wife will arrive later this afternoon. Will try calling again tomorrow to update. 3/22/22 3:30pm - Tried calling patient's wife Trinity to give updates (patient gave verbal consent to discuss his medical care in full) - no answer and left voicemail. Pt informed me that his wife will arrive later this afternoon. Will try calling again tomorrow to update if I don't see her today

## 2022-03-22 NOTE — PROGRESS NOTE ADULT - PROBLEM SELECTOR PLAN 2
Patient reports difficulty with BM despite aggressive bowel regimen medications.   > Last BM 3/18. continue to monitor BMs   > On relistor daily per outpatient records- 450mg PO qday in AM   > continue miralax bid, senna 2 tabs bid.

## 2022-03-22 NOTE — PROGRESS NOTE ADULT - SUBJECTIVE AND OBJECTIVE BOX
Patient is a 56y old  Male who presents with a chief complaint of Rectal pain and bleeding, difficulty ambulating (22 Mar 2022 11:06)      SUBJECTIVE / OVERNIGHT EVENTS: No acute events overnight. Pt has no new complaints. Pain well controlled. Still noticing some blood on toilet paper, none in stool per pt.     ADDITIONAL REVIEW OF SYSTEMS:    MEDICATIONS  (STANDING):  atorvastatin 10 milliGRAM(s) Oral at bedtime  bictegravir 50 mG/emtricitabine 200 mG/tenofovir alafenamide 25 mG (BIKTARVY) 1 Tablet(s) Oral daily  chlorhexidine 2% Cloths 1 Application(s) Topical daily  gabapentin 300 milliGRAM(s) Oral <User Schedule>  metoprolol succinate ER 50 milliGRAM(s) Oral daily  morphine ER Tablet 60 milliGRAM(s) Oral every 8 hours  OLANZapine 10 milliGRAM(s) Oral at bedtime  piperacillin/tazobactam IVPB.. 3.375 Gram(s) IV Intermittent every 8 hours  polyethylene glycol 3350 17 Gram(s) Oral two times a day  psyllium Powder 1 Packet(s) Oral two times a day  senna 2 Tablet(s) Oral two times a day    MEDICATIONS  (PRN):  acetaminophen     Tablet .. 650 milliGRAM(s) Oral every 6 hours PRN Temp greater or equal to 38C (100.4F), Mild Pain (1 - 3)  ALPRAZolam 1 milliGRAM(s) Oral daily PRN anxiety  HYDROmorphone  Injectable 1 milliGRAM(s) IV Push every 3 hours PRN Moderate Pain (4 - 6)  HYDROmorphone  Injectable 2 milliGRAM(s) IV Push every 3 hours PRN Severe Pain (7 - 10)  zolpidem 5 milliGRAM(s) Oral at bedtime PRN Insomnia  zolpidem 5 milliGRAM(s) Oral at bedtime PRN Insomnia      CAPILLARY BLOOD GLUCOSE        I&O's Summary    21 Mar 2022 07:01  -  22 Mar 2022 07:00  --------------------------------------------------------  IN: 900 mL / OUT: 0 mL / NET: 900 mL    22 Mar 2022 07:01  -  22 Mar 2022 13:57  --------------------------------------------------------  IN: 300 mL / OUT: 0 mL / NET: 300 mL        PHYSICAL EXAM:  Vital Signs Last 24 Hrs  T(C): 36.9 (22 Mar 2022 13:15), Max: 37.1 (22 Mar 2022 00:14)  T(F): 98.4 (22 Mar 2022 13:15), Max: 98.7 (22 Mar 2022 00:14)  HR: 97 (22 Mar 2022 13:15) (95 - 103)  BP: 127/81 (22 Mar 2022 13:15) (121/78 - 146/87)  BP(mean): --  RR: 16 (22 Mar 2022 13:15) (16 - 18)  SpO2: 99% (22 Mar 2022 13:15) (96% - 99%)  CONSTITUTIONAL: NAD, well-developed, well-groomed  EYES: PERRLA; conjunctiva and sclera clear  ENMT: Moist oral mucosa, no pharyngeal injection or exudates; normal dentition  NECK: Supple, no palpable masses; no thyromegaly  RESPIRATORY: Normal respiratory effort; lungs are clear to auscultation bilaterally  CARDIOVASCULAR: Regular rate and rhythm, normal S1 and S2, no murmur/rub/gallop; No lower extremity edema; Peripheral pulses are 2+ bilaterally  ABDOMEN: Nontender to palpation, normoactive bowel sounds, no rebound/guarding; No hepatosplenomegaly  MUSCULOSKELETAL:  Normal gait; no clubbing or cyanosis of digits; no joint swelling or tenderness to palpation  PSYCH: A+O to person, place, and time; affect appropriate  NEUROLOGY: CN 2-12 are intact and symmetric; no gross sensory deficits   SKIN: No rashes; no palpable lesions    LABS:                        10.7   7.89  )-----------( 398      ( 22 Mar 2022 06:57 )             33.0     03-22    138  |  102  |  10  ----------------------------<  101<H>  3.5   |  26  |  0.88    Ca    8.8      22 Mar 2022 06:57  Phos  4.1     03-22  Mg     2.30     03-22    TPro  6.9  /  Alb  3.4  /  TBili  0.2  /  DBili  x   /  AST  15  /  ALT  13  /  AlkPhos  70  03-22                RADIOLOGY & ADDITIONAL TESTS:  Results Reviewed:   Imaging Personally Reviewed:  Electrocardiogram Personally Reviewed:    COORDINATION OF CARE:  Care Discussed with Consultants/Other Providers [Y/N]:  Prior or Outpatient Records Reviewed [Y/N]:

## 2022-03-22 NOTE — PROGRESS NOTE ADULT - PROBLEM SELECTOR PLAN 4
>appreciate GI recs - consider sucralafate enemas if ongoing bleeding if patient can tolerate   > f/u colorectal surgery recs after MRI   > On IV abx   > pain management as above.

## 2022-03-22 NOTE — PROGRESS NOTE ADULT - PROBLEM SELECTOR PLAN 11
- SCDs for now given reported blood in his stool   - Will consider starting Lovenox if MRI abdomen neg

## 2022-03-22 NOTE — PROGRESS NOTE ADULT - ASSESSMENT
56 m with HTN< HIV on Biktarvy, last VL:UD and CD4>900, prostate ca 2016 s/p radiation, anal cancer (dx 4/2021) s/p radiation and chemotherapy (last 8/2021) complicated by an ulcer,  p/w rectal bleeding and pain, also b/l inguinal pain  afebrile, tmax: 100 but tachy, WBC normal  CT: Limited evaluation for residual sinus tract. A small droplet of gas is noted in the left intersphincteric space with associated edema, similar in appearance to prior CT dated 1/18/2022. There is an appearance concerning for residual tract  blood and urine cx negative  CD4 now 239    HIV on Biktarvy, VL UD, CD4 now 239 (was >900 before)  anal Ca s/p chemo and RT with rectal ulcer, now with rectal pain and bleeding, CT with unchanged gas and edema in the L intersphincteric space, ?residual tract  b/l inguinal pain, unclear etiology, LS spine MRI with disc bulges with L4-L5, mild to mod spinal stenosis    * f/u the pelvis MRI with liberty  *  if no evidence of infection on pelvis MRI, discontinue zosyn, started 3/18, now day 5  * c/w biktarvy  * f/u with surgery    The above assessment and plan was discussed with the primary team    Denise Gordon MD  contact on teams  After 5pm and on weekends call [phone]

## 2022-03-22 NOTE — PROGRESS NOTE ADULT - SUBJECTIVE AND OBJECTIVE BOX
Follow Up:  HIV, rectal bleeding, ?collection    Interval History: pt afebrile, no acute events    ROS:      All other systems negative    Constitutional: no fever, no chills  Cardiovascular:  no chest pain, no palpitation  Respiratory:  no SOB, no cough  GI:  no abd pain, no vomiting, no more rectal bleed  urinary: no dysuria, no hematuria, no flank pain  musculoskeletal: b/l inguinal pain  skin:  no rash  neurology:  no headache, no seizure      Allergies  No Known Allergies        ANTIMICROBIALS:  bictegravir 50 mG/emtricitabine 200 mG/tenofovir alafenamide 25 mG (BIKTARVY) 1 daily  piperacillin/tazobactam IVPB.. 3.375 every 8 hours      OTHER MEDS:  acetaminophen     Tablet .. 650 milliGRAM(s) Oral every 6 hours PRN  ALPRAZolam 1 milliGRAM(s) Oral daily PRN  atorvastatin 10 milliGRAM(s) Oral at bedtime  chlorhexidine 2% Cloths 1 Application(s) Topical daily  gabapentin 300 milliGRAM(s) Oral <User Schedule>  HYDROmorphone  Injectable 1 milliGRAM(s) IV Push every 3 hours PRN  HYDROmorphone  Injectable 2 milliGRAM(s) IV Push every 3 hours PRN  metoprolol succinate ER 50 milliGRAM(s) Oral daily  morphine ER Tablet 60 milliGRAM(s) Oral every 8 hours  OLANZapine 10 milliGRAM(s) Oral at bedtime  polyethylene glycol 3350 17 Gram(s) Oral two times a day  psyllium Powder 1 Packet(s) Oral two times a day  senna 2 Tablet(s) Oral two times a day  zolpidem 5 milliGRAM(s) Oral at bedtime PRN  zolpidem 5 milliGRAM(s) Oral at bedtime PRN      Vital Signs Last 24 Hrs  T(C): 36.9 (22 Mar 2022 13:15), Max: 37.1 (22 Mar 2022 00:14)  T(F): 98.4 (22 Mar 2022 13:15), Max: 98.7 (22 Mar 2022 00:14)  HR: 97 (22 Mar 2022 13:15) (95 - 103)  BP: 127/81 (22 Mar 2022 13:15) (121/78 - 146/87)  BP(mean): --  RR: 16 (22 Mar 2022 13:15) (16 - 18)  SpO2: 99% (22 Mar 2022 13:15) (96% - 99%)    Physical Exam:  General:    NAD,  non toxic  Cardio:     regular S1, S2  Respiratory:    clear b/l,    no wheezing  abd:     soft,   BS +,   no tenderness  :   no CVAT,  no suprapubic tenderness,   no  davis  Musculoskeletal:   no joint swelling  vascular: no phlebitis  Skin:    no rash                          10.7   7.89  )-----------( 398      ( 22 Mar 2022 06:57 )             33.0       03-22    138  |  102  |  10  ----------------------------<  101<H>  3.5   |  26  |  0.88    Ca    8.8      22 Mar 2022 06:57  Phos  4.1     03-22  Mg     2.30     03-22    TPro  6.9  /  Alb  3.4  /  TBili  0.2  /  DBili  x   /  AST  15  /  ALT  13  /  AlkPhos  70  03-22          MICROBIOLOGY:  v  Clean Catch Clean Catch (Midstream)  03-17-22   No growth  --  --      .Blood Blood-Peripheral  03-17-22   No growth to date.  --  --        HIV-1 RNA Quantitative, Viral Load Log: NOT DET. lg /mL (03-18-22 @ 12:31)    Rapid RVP Result: NotDetec (03-17 @ 13:34)        RADIOLOGY:  Images independently visualized and reviewed personally, findings as below  < from: MR Lumbar Spine w/wo IV Cont (03.21.22 @ 09:11) >  IMPRESSION:  Disc bulges with L4-L5 mild to moderate spinal stenosis.    < end of copied text >  < from: CT Abdomen and Pelvis w/ Oral Cont and w/ IV Cont (03.17.22 @ 15:54) >    IMPRESSION:  Limited evaluation for residual sinus tract. A small droplet of gas is   noted in the left intersphincteric space with associated edema, similar   in appearance to prior CT dated 1/18/2022. There is an appearance   concerning for residual tract or collection as described above. MRI is   recommended to better delineate possible fistulae.    < end of copied text >

## 2022-03-22 NOTE — PROGRESS NOTE ADULT - ASSESSMENT
complete note to follow  > increase severe PRN to 2mg IV dilaudid   > added sitz baths  57 y/o M w/ PMH rectal cancer s/p radiation, prostate cancer s/p radiation, HIV on biktarvy, diverticulosis w/ h/o diverticulitis, anxiety, depression, HTN, chronic constipation presents with uncontrollable rectal pain, bleeding, subjective fevers, chills, diaphoresis, and difficulty ambulating. Palliative consulted for symptom management in setting of rectal cancer.

## 2022-03-23 ENCOUNTER — APPOINTMENT (OUTPATIENT)
Dept: HYPERBARIC MEDICINE | Facility: CLINIC | Age: 57
End: 2022-03-23

## 2022-03-23 ENCOUNTER — APPOINTMENT (OUTPATIENT)
Dept: HEMATOLOGY ONCOLOGY | Facility: CLINIC | Age: 57
End: 2022-03-23

## 2022-03-23 DIAGNOSIS — K61.0 ANAL ABSCESS: ICD-10-CM

## 2022-03-23 LAB
ANION GAP SERPL CALC-SCNC: 10 MMOL/L — SIGNIFICANT CHANGE UP (ref 7–14)
BASOPHILS # BLD AUTO: 0.02 K/UL — SIGNIFICANT CHANGE UP (ref 0–0.2)
BASOPHILS NFR BLD AUTO: 0.3 % — SIGNIFICANT CHANGE UP (ref 0–2)
BUN SERPL-MCNC: 8 MG/DL — SIGNIFICANT CHANGE UP (ref 7–23)
CALCIUM SERPL-MCNC: 9 MG/DL — SIGNIFICANT CHANGE UP (ref 8.4–10.5)
CHLORIDE SERPL-SCNC: 104 MMOL/L — SIGNIFICANT CHANGE UP (ref 98–107)
CO2 SERPL-SCNC: 26 MMOL/L — SIGNIFICANT CHANGE UP (ref 22–31)
CREAT SERPL-MCNC: 0.8 MG/DL — SIGNIFICANT CHANGE UP (ref 0.5–1.3)
EGFR: 105 ML/MIN/1.73M2 — SIGNIFICANT CHANGE UP
EOSINOPHIL # BLD AUTO: 0.03 K/UL — SIGNIFICANT CHANGE UP (ref 0–0.5)
EOSINOPHIL NFR BLD AUTO: 0.4 % — SIGNIFICANT CHANGE UP (ref 0–6)
GLUCOSE SERPL-MCNC: 106 MG/DL — HIGH (ref 70–99)
HCT VFR BLD CALC: 32 % — LOW (ref 39–50)
HGB BLD-MCNC: 10.8 G/DL — LOW (ref 13–17)
IANC: 4.76 K/UL — SIGNIFICANT CHANGE UP (ref 1.5–8.5)
IMM GRANULOCYTES NFR BLD AUTO: 0.6 % — SIGNIFICANT CHANGE UP (ref 0–1.5)
LYMPHOCYTES # BLD AUTO: 1.23 K/UL — SIGNIFICANT CHANGE UP (ref 1–3.3)
LYMPHOCYTES # BLD AUTO: 17.6 % — SIGNIFICANT CHANGE UP (ref 13–44)
MAGNESIUM SERPL-MCNC: 2.3 MG/DL — SIGNIFICANT CHANGE UP (ref 1.6–2.6)
MCHC RBC-ENTMCNC: 28.8 PG — SIGNIFICANT CHANGE UP (ref 27–34)
MCHC RBC-ENTMCNC: 33.8 GM/DL — SIGNIFICANT CHANGE UP (ref 32–36)
MCV RBC AUTO: 85.3 FL — SIGNIFICANT CHANGE UP (ref 80–100)
MONOCYTES # BLD AUTO: 0.9 K/UL — SIGNIFICANT CHANGE UP (ref 0–0.9)
MONOCYTES NFR BLD AUTO: 12.9 % — SIGNIFICANT CHANGE UP (ref 2–14)
NEUTROPHILS # BLD AUTO: 4.76 K/UL — SIGNIFICANT CHANGE UP (ref 1.8–7.4)
NEUTROPHILS NFR BLD AUTO: 68.2 % — SIGNIFICANT CHANGE UP (ref 43–77)
NRBC # BLD: 0 /100 WBCS — SIGNIFICANT CHANGE UP
NRBC # FLD: 0 K/UL — SIGNIFICANT CHANGE UP
PHOSPHATE SERPL-MCNC: 3.7 MG/DL — SIGNIFICANT CHANGE UP (ref 2.5–4.5)
PLATELET # BLD AUTO: 388 K/UL — SIGNIFICANT CHANGE UP (ref 150–400)
POTASSIUM SERPL-MCNC: 3.9 MMOL/L — SIGNIFICANT CHANGE UP (ref 3.5–5.3)
POTASSIUM SERPL-SCNC: 3.9 MMOL/L — SIGNIFICANT CHANGE UP (ref 3.5–5.3)
RBC # BLD: 3.75 M/UL — LOW (ref 4.2–5.8)
RBC # FLD: 13.5 % — SIGNIFICANT CHANGE UP (ref 10.3–14.5)
SODIUM SERPL-SCNC: 140 MMOL/L — SIGNIFICANT CHANGE UP (ref 135–145)
WBC # BLD: 6.98 K/UL — SIGNIFICANT CHANGE UP (ref 3.8–10.5)
WBC # FLD AUTO: 6.98 K/UL — SIGNIFICANT CHANGE UP (ref 3.8–10.5)

## 2022-03-23 PROCEDURE — 99232 SBSQ HOSP IP/OBS MODERATE 35: CPT

## 2022-03-23 PROCEDURE — 99233 SBSQ HOSP IP/OBS HIGH 50: CPT

## 2022-03-23 RX ORDER — MORPHINE SULFATE 50 MG/1
90 CAPSULE, EXTENDED RELEASE ORAL EVERY 8 HOURS
Refills: 0 | Status: DISCONTINUED | OUTPATIENT
Start: 2022-03-23 | End: 2022-03-24

## 2022-03-23 RX ADMIN — MORPHINE SULFATE 90 MILLIGRAM(S): 50 CAPSULE, EXTENDED RELEASE ORAL at 21:14

## 2022-03-23 RX ADMIN — HYDROMORPHONE HYDROCHLORIDE 2 MILLIGRAM(S): 2 INJECTION INTRAMUSCULAR; INTRAVENOUS; SUBCUTANEOUS at 11:38

## 2022-03-23 RX ADMIN — HYDROMORPHONE HYDROCHLORIDE 2 MILLIGRAM(S): 2 INJECTION INTRAMUSCULAR; INTRAVENOUS; SUBCUTANEOUS at 13:30

## 2022-03-23 RX ADMIN — HYDROMORPHONE HYDROCHLORIDE 2 MILLIGRAM(S): 2 INJECTION INTRAMUSCULAR; INTRAVENOUS; SUBCUTANEOUS at 04:46

## 2022-03-23 RX ADMIN — CHLORHEXIDINE GLUCONATE 1 APPLICATION(S): 213 SOLUTION TOPICAL at 11:40

## 2022-03-23 RX ADMIN — MORPHINE SULFATE 60 MILLIGRAM(S): 50 CAPSULE, EXTENDED RELEASE ORAL at 05:51

## 2022-03-23 RX ADMIN — HYDROMORPHONE HYDROCHLORIDE 2 MILLIGRAM(S): 2 INJECTION INTRAMUSCULAR; INTRAVENOUS; SUBCUTANEOUS at 04:31

## 2022-03-23 RX ADMIN — HYDROMORPHONE HYDROCHLORIDE 2 MILLIGRAM(S): 2 INJECTION INTRAMUSCULAR; INTRAVENOUS; SUBCUTANEOUS at 09:00

## 2022-03-23 RX ADMIN — ZOLPIDEM TARTRATE 5 MILLIGRAM(S): 10 TABLET ORAL at 21:29

## 2022-03-23 RX ADMIN — BICTEGRAVIR SODIUM, EMTRICITABINE, AND TENOFOVIR ALAFENAMIDE FUMARATE 1 TABLET(S): 30; 120; 15 TABLET ORAL at 21:14

## 2022-03-23 RX ADMIN — PIPERACILLIN AND TAZOBACTAM 25 GRAM(S): 4; .5 INJECTION, POWDER, LYOPHILIZED, FOR SOLUTION INTRAVENOUS at 16:41

## 2022-03-23 RX ADMIN — HYDROMORPHONE HYDROCHLORIDE 2 MILLIGRAM(S): 2 INJECTION INTRAMUSCULAR; INTRAVENOUS; SUBCUTANEOUS at 16:41

## 2022-03-23 RX ADMIN — HYDROMORPHONE HYDROCHLORIDE 2 MILLIGRAM(S): 2 INJECTION INTRAMUSCULAR; INTRAVENOUS; SUBCUTANEOUS at 20:41

## 2022-03-23 RX ADMIN — GABAPENTIN 300 MILLIGRAM(S): 400 CAPSULE ORAL at 08:12

## 2022-03-23 RX ADMIN — MORPHINE SULFATE 60 MILLIGRAM(S): 50 CAPSULE, EXTENDED RELEASE ORAL at 06:51

## 2022-03-23 RX ADMIN — ATORVASTATIN CALCIUM 10 MILLIGRAM(S): 80 TABLET, FILM COATED ORAL at 21:15

## 2022-03-23 RX ADMIN — PIPERACILLIN AND TAZOBACTAM 25 GRAM(S): 4; .5 INJECTION, POWDER, LYOPHILIZED, FOR SOLUTION INTRAVENOUS at 08:11

## 2022-03-23 RX ADMIN — MORPHINE SULFATE 90 MILLIGRAM(S): 50 CAPSULE, EXTENDED RELEASE ORAL at 13:28

## 2022-03-23 RX ADMIN — PIPERACILLIN AND TAZOBACTAM 25 GRAM(S): 4; .5 INJECTION, POWDER, LYOPHILIZED, FOR SOLUTION INTRAVENOUS at 01:22

## 2022-03-23 RX ADMIN — HYDROMORPHONE HYDROCHLORIDE 2 MILLIGRAM(S): 2 INJECTION INTRAMUSCULAR; INTRAVENOUS; SUBCUTANEOUS at 08:17

## 2022-03-23 RX ADMIN — MORPHINE SULFATE 90 MILLIGRAM(S): 50 CAPSULE, EXTENDED RELEASE ORAL at 22:14

## 2022-03-23 RX ADMIN — HYDROMORPHONE HYDROCHLORIDE 2 MILLIGRAM(S): 2 INJECTION INTRAMUSCULAR; INTRAVENOUS; SUBCUTANEOUS at 20:56

## 2022-03-23 RX ADMIN — OLANZAPINE 10 MILLIGRAM(S): 15 TABLET, FILM COATED ORAL at 21:15

## 2022-03-23 RX ADMIN — HYDROMORPHONE HYDROCHLORIDE 2 MILLIGRAM(S): 2 INJECTION INTRAMUSCULAR; INTRAVENOUS; SUBCUTANEOUS at 17:00

## 2022-03-23 RX ADMIN — Medication 50 MILLIGRAM(S): at 05:55

## 2022-03-23 RX ADMIN — GABAPENTIN 300 MILLIGRAM(S): 400 CAPSULE ORAL at 12:51

## 2022-03-23 RX ADMIN — GABAPENTIN 300 MILLIGRAM(S): 400 CAPSULE ORAL at 21:14

## 2022-03-23 NOTE — PROGRESS NOTE ADULT - PROBLEM SELECTOR PLAN 1
-patient with acute on chronic rectal pain associated w/ hematochezia and constipation  -pain in setting of multiple radiation treatments to the area due to h/o anal cancer and prostate cancer  -chronically on MS contin 60mg q8 and dilaudid PO 8mg q4, which has not been adequately controlling the pain  -pain associated with yellow drainage from the area  -CT scan limited for evaluation of sinus tract  - MRI showing perianal abscess   -surgery consulted, f/u recs   -Palliative consulted for pain management  - c/w bowel regimen (home relistor now given iv)

## 2022-03-23 NOTE — PROGRESS NOTE ADULT - PROBLEM SELECTOR PLAN 4
>appreciate GI recs - consider sucralafate enemas if ongoing bleeding if patient can tolerate   >colorectal surgery recs appreciated: no surgical intervention at this time   > On IV abx. Appreciate ID recs. If no infection seen on MRI, can d/c IV abx.   > pain management as above. MRI a/p: A 2.9 cm left perianal abscess. No evidence of associated fistula.  >appreciate GI recs - consider sucralafate enemas if ongoing bleeding if patient can tolerate   > f/u colorectal surgery recs   > On IV abx   > pain management as above.

## 2022-03-23 NOTE — PROGRESS NOTE ADULT - PROBLEM SELECTOR PLAN 5
PMH anal cancer (dx 4/2021) s/p radiation and chemo (last 8/2021) complicated by an ulcer  > Pt completed radiation and chemo (8/2021) at [facility].

## 2022-03-23 NOTE — PROGRESS NOTE ADULT - ASSESSMENT
56yM w/ Possible perianal fistula    Recommendations:  - c/w current medical management  - Will review MRI  - No acute surgical intervention at this time    - Please page [phone] w/ any questions

## 2022-03-23 NOTE — CHART NOTE - NSCHARTNOTEFT_GEN_A_CORE
Per discussion with surgery this PM, tentative plan for OR for drainage of perianal abscess on 3/25.

## 2022-03-23 NOTE — PROGRESS NOTE ADULT - SUBJECTIVE AND OBJECTIVE BOX
[facility] Geriatrics and Palliative Care  Terri Roberts Palliative Care Attending  Contact Info: Page [phone] (including Nights/Weekends), message on Microsoft Teams (Terri Roberts), or leave  at Palliative Office [phone] (non-urgent)     SUBJECTIVE AND OBJECTIVE: Patient     INTERVAL HPI/OVERNIGHT EVENTS:  > 3/22: Over the past 24 hours, patient required PRNs of IV dilaudid 1.5mg x5, IV dilaudid 1mg x1, and ambien 5mg x1.   > 3/23: Over the past 24 hours, patient required PRNs of IV dilaudid 2mg x6.     DNR on chart:   Allergies    No Known Allergies    Intolerances    MEDICATIONS  (STANDING):  atorvastatin 10 milliGRAM(s) Oral at bedtime  bictegravir 50 mG/emtricitabine 200 mG/tenofovir alafenamide 25 mG (BIKTARVY) 1 Tablet(s) Oral daily  chlorhexidine 2% Cloths 1 Application(s) Topical daily  gabapentin 300 milliGRAM(s) Oral <User Schedule>  metoprolol succinate ER 50 milliGRAM(s) Oral daily  morphine ER Tablet 90 milliGRAM(s) Oral every 8 hours  OLANZapine 10 milliGRAM(s) Oral at bedtime  piperacillin/tazobactam IVPB.. 3.375 Gram(s) IV Intermittent every 8 hours  polyethylene glycol 3350 17 Gram(s) Oral two times a day  psyllium Powder 1 Packet(s) Oral two times a day  senna 2 Tablet(s) Oral two times a day    MEDICATIONS  (PRN):  acetaminophen     Tablet .. 650 milliGRAM(s) Oral every 6 hours PRN Temp greater or equal to 38C (100.4F), Mild Pain (1 - 3)  ALPRAZolam 1 milliGRAM(s) Oral daily PRN anxiety  HYDROmorphone  Injectable 1 milliGRAM(s) IV Push every 3 hours PRN Moderate Pain (4 - 6)  HYDROmorphone  Injectable 2 milliGRAM(s) IV Push every 3 hours PRN Severe Pain (7 - 10)  zolpidem 5 milliGRAM(s) Oral at bedtime PRN Insomnia  zolpidem 5 milliGRAM(s) Oral at bedtime PRN Insomnia      ITEMS UNCHECKED ARE NOT PRESENT    PRESENT SYMPTOMS: [ ]Unable to obtain due to poor mentation   Source if other than patient:  [ ]Family   [ ]Team     Pain:  [ ]yes [ ]no  QOL impact -   Location -                    Aggravating factors -  Quality -  Radiation -  Timing-  Severity (0-10 scale):  Minimal acceptable level (0-10 scale):     Dyspnea:                           [ ]Mild [ ]Moderate [ ]Severe  Anxiety:                             [ ]Mild [ ]Moderate [ ]Severe  Fatigue:                             [ ]Mild [ ]Moderate [ ]Severe  Nausea:                             [ ]Mild [ ]Moderate [ ]Severe  Loss of appetite:              [ ]Mild [ ]Moderate [ ]Severe  Constipation:                    [ ]Mild [ ]Moderate [ ]Severe    PAIN AD Score:	  [email or web address] (Ctrl + left click to view)    Other Symptoms:  [ ]All other review of systems negative     Palliative Performance Status Version 2:         %      http://npcrc.org/files/news/palliative_performance_scale_ppsv2.pdf  PHYSICAL EXAM:  Vital Signs Last 24 Hrs  T(C): 37.3 (23 Mar 2022 10:00), Max: 37.3 (23 Mar 2022 10:00)  T(F): 99.1 (23 Mar 2022 10:00), Max: 99.1 (23 Mar 2022 10:00)  HR: 102 (23 Mar 2022 10:00) (94 - 104)  BP: 132/90 (23 Mar 2022 10:00) (119/77 - 132/90)  BP(mean): 99 (23 Mar 2022 10:00) (99 - 99)  RR: 18 (23 Mar 2022 10:00) (16 - 18)  SpO2: 97% (23 Mar 2022 10:00) (96% - 99%) I&O's Summary    22 Mar 2022 07:01  -  23 Mar 2022 07:00  --------------------------------------------------------  IN: 900 mL / OUT: 550 mL / NET: 350 mL       GENERAL:  [ ]Alert  [ ]Oriented x   [ ]Lethargic  [ ]Cachexia  [ ]Unarousable  [ ]Verbal  [ ]Non-Verbal  Behavioral:   [ ]Anxiety  [ ]Delirium [ ]Agitation [ ]Other  HEENT:  [ ]Normal   [ ]Dry mouth   [ ]ET Tube/Trach  [ ]Oral lesions  PULMONARY:   [ ]Clear [ ]Tachypnea  [ ]Audible excessive secretions   [ ]Rhonchi        [ ]Right [ ]Left [ ]Bilateral  [ ]Crackles        [ ]Right [ ]Left [ ]Bilateral  [ ]Wheezing     [ ]Right [ ]Left [ ]Bilateral  [ ]Diminished BS [ ] Right [ ]Left [ ]Bilateral  CARDIOVASCULAR:    [ ]Regular [ ]Irregular [ ]Tachy  [ ]Vladimir [ ]Murmur [ ]Other  GASTROINTESTINAL:  [ ]Soft  [ ]Distended   [ ]+BS  [ ]Non tender [ ]Tender  [ ]PEG [ ]OGT/ NGT   Last BM:    GENITOURINARY:  [ ]Normal [ ]Incontinent   [ ]Oliguria/Anuria   [ ]Sahu  MUSCULOSKELETAL:   [ ]Normal   [ ]Weakness  [ ]Bed/Wheelchair bound [ ]Edema  NEUROLOGIC:   [ ]No focal deficits  [ ] Cognitive impairment  [ ] Dysphagia [ ]Dysarthria [ ] Paresis [ ]Other   SKIN:   [ ]Normal  [ ]Rash   [ ]Pressure ulcer(s) [ ]y [ ]n present on admission    CRITICAL CARE:  [ ]Shock Present  [ ]Septic [ ]Cardiogenic [ ]Neurologic [ ]Hypovolemic  [ ]Vasopressors [ ]Inotropes  [ ]Respiratory failure present [ ]Mechanical Ventilation [ ]Non-invasive ventilatory support [ ]High-Flow  [ ]Acute  [ ]Chronic [ ]Hypoxic  [ ]Hypercarbic [ ]Other  [ ]Other organ failure     LABS:                        10.8   6.98  )-----------( 388      ( 23 Mar 2022 06:34 )             32.0   03-23    140  |  104  |  8   ----------------------------<  106<H>  3.9   |  26  |  0.80    Ca    9.0      23 Mar 2022 06:34  Phos  3.7     03-23  Mg     2.30     03-23    TPro  6.9  /  Alb  3.4  /  TBili  0.2  /  DBili  x   /  AST  15  /  ALT  13  /  AlkPhos  70  03-22        RADIOLOGY & ADDITIONAL STUDIES:    Protein Calorie Malnutrition Present: [ ]mild [ ]moderate [ ]severe [ ]underweight [ ]morbid obesity  https://www.andeal.org/vault/8613/web/files/ONC/Table_Clinical%20Characteristics%20to%20Document%20Malnutrition-White%20JV%20et%20al%515228.pdf    Height (cm): 182.9 (03-18-22 @ 00:12), 180.3 (11-26-21 @ 06:50), 180.3 (11-24-21 @ 10:02)  Weight (kg): 95 (03-18-22 @ 00:12), 101.6 (11-26-21 @ 06:50), 101.6 (11-24-21 @ 10:02)  BMI (kg/m2): 28.4 (03-18-22 @ 00:12), 31.3 (11-26-21 @ 06:50), 31.3 (11-24-21 @ 10:02)    [ ]PPSV2 < or = 30%  [ ]significant weight loss [ ]poor nutritional intake [ ]anasarca    [ ]Artificial Nutrition    REFERRALS:   [ ]Chaplaincy  [ ]Hospice  [ ]Child Life  [ ]Social Work  [ ]Case management [ ]Holistic Therapy    [facility] Geriatrics and Palliative Care  Terri Roberts, Palliative Care Attending  Contact Info: Page [phone] (including Nights/Weekends), message on Microsoft Teams (Terri Roberts), or leave  at Palliative Office [phone] (non-urgent)     SUBJECTIVE AND OBJECTIVE: Patient seen this morning, lying in bed, NAD but reports rectal pain is severe but improved with IV dilaudid. Explained that MS Contin was increased this morning.     INTERVAL HPI/OVERNIGHT EVENTS:  > 3/22: Over the past 24 hours, patient required PRNs of IV dilaudid 1.5mg x5, IV dilaudid 1mg x1, and ambien 5mg x1.   > 3/23: Over the past 24 hours, patient required PRNs of IV dilaudid 2mg x6.     DNR on chart:   Allergies    No Known Allergies    Intolerances    MEDICATIONS  (STANDING):  atorvastatin 10 milliGRAM(s) Oral at bedtime  bictegravir 50 mG/emtricitabine 200 mG/tenofovir alafenamide 25 mG (BIKTARVY) 1 Tablet(s) Oral daily  chlorhexidine 2% Cloths 1 Application(s) Topical daily  gabapentin 300 milliGRAM(s) Oral <User Schedule>  metoprolol succinate ER 50 milliGRAM(s) Oral daily  morphine ER Tablet 90 milliGRAM(s) Oral every 8 hours  OLANZapine 10 milliGRAM(s) Oral at bedtime  piperacillin/tazobactam IVPB.. 3.375 Gram(s) IV Intermittent every 8 hours  polyethylene glycol 3350 17 Gram(s) Oral two times a day  psyllium Powder 1 Packet(s) Oral two times a day  senna 2 Tablet(s) Oral two times a day    MEDICATIONS  (PRN):  acetaminophen     Tablet .. 650 milliGRAM(s) Oral every 6 hours PRN Temp greater or equal to 38C (100.4F), Mild Pain (1 - 3)  ALPRAZolam 1 milliGRAM(s) Oral daily PRN anxiety  HYDROmorphone  Injectable 1 milliGRAM(s) IV Push every 3 hours PRN Moderate Pain (4 - 6)  HYDROmorphone  Injectable 2 milliGRAM(s) IV Push every 3 hours PRN Severe Pain (7 - 10)  zolpidem 5 milliGRAM(s) Oral at bedtime PRN Insomnia  zolpidem 5 milliGRAM(s) Oral at bedtime PRN Insomnia      ITEMS UNCHECKED ARE NOT PRESENT    PRESENT SYMPTOMS: [ ]Unable to obtain due to poor mentation   Source if other than patient:  [ ]Family   [ ]Team     Pain: [x ]yes [ ]no  QOL impact - difficulty ambulating   Location -    rectal pain, b/l leg pain                Aggravating factors - pain is constant, no pattern identified   Quality - sharp and stabbing   Radiation - rectal pain radiates to anterior groin   Timing- constant   Severity (0-10 scale): 9  Minimal acceptable level (0-10 scale):     Dyspnea:                           [ ]Mild [ ]Moderate [ ]Severe  Anxiety:                             [ ]Mild [ ]Moderate [ ]Severe  Fatigue:                             [ ]Mild [ ]Moderate [ ]Severe  Nausea:                             [ ]Mild [ ]Moderate [ ]Severe  Loss of appetite:              [ ]Mild [ ]Moderate [ ]Severe  Constipation:                    [ ]Mild [ ]Moderate [ ]Severe    PAIN AD Score:	  [email or web address] (Ctrl + left click to view)    Other Symptoms:  [ ]All other review of systems negative     Palliative Performance Status Version 2:   70      %      http://npcrc.org/files/news/palliative_performance_scale_ppsv2.pdf  PHYSICAL EXAM:  Vital Signs Last 24 Hrs  T(C): 37.3 (23 Mar 2022 10:00), Max: 37.3 (23 Mar 2022 10:00)  T(F): 99.1 (23 Mar 2022 10:00), Max: 99.1 (23 Mar 2022 10:00)  HR: 102 (23 Mar 2022 10:00) (94 - 104)  BP: 132/90 (23 Mar 2022 10:00) (119/77 - 132/90)  BP(mean): 99 (23 Mar 2022 10:00) (99 - 99)  RR: 18 (23 Mar 2022 10:00) (16 - 18)  SpO2: 97% (23 Mar 2022 10:00) (96% - 99%) I&O's Summary    22 Mar 2022 07:01  -  23 Mar 2022 07:00  --------------------------------------------------------  IN: 900 mL / OUT: 550 mL / NET: 350 mL       GENERAL:  [x ]Alert  [x ]Oriented x4   [ ]Lethargic  [ ]Cachexia  [ ]Unarousable  [x ]Verbal  [ ]Non-Verbal  Behavioral:   [ ] Anxiety  [ ] Delirium [ ] Agitation [x ] Other  HEENT:  [ x]Normal   [ ]Dry mouth   [ ]ET Tube/Trach  [ ]Oral lesions  PULMONARY:   [x ]Clear [ ]Tachypnea  [ ]Audible excessive secretions   [ ]Rhonchi        [ ]Right [ ]Left [ ]Bilateral  [ ]Crackles        [ ]Right [ ]Left [ ]Bilateral  [ ]Wheezing     [ ]Right [ ]Left [ ]Bilateral  [ ]Diminished breath sounds [ ]right [ ]left [ ]bilateral  CARDIOVASCULAR:    [ ]Regular [ ]Irregular [x ]Tachy  [ ]Vladimir [ ]Murmur [ ]Other  GASTROINTESTINAL:  [ x]Soft  [ ]Distended   [ ]+BS  [ ]Non tender [ ]Tender  [ ]PEG [ ]OGT/ NGT  Last BM: 3/22  GENITOURINARY: please see flowsheets   [ ]Normal [ ] Incontinent   [ ]Oliguria/Anuria   [ ]Sahu  MUSCULOSKELETAL:   [ x]Normal   [ ]Weakness  [ ]Bed/Wheelchair bound [ ]Edema  NEUROLOGIC:   [x ]No focal deficits  [ ]Cognitive impairment  [ ]Dysphagia [ ]Dysarthria [ ]Paresis [ ]Other   SKIN: please see flowsheets   [ ]Normal    [ ]Rash  [ ]Pressure ulcer(s)       Present on admission [ ]y [ ]n    CRITICAL CARE:  [ ] Shock Present  [ ]Septic [ ]Cardiogenic [ ]Neurologic [ ]Hypovolemic  [ ]  Vasopressors [ ]  Inotropes   [ ]Respiratory failure present [ ]Mechanical ventilation [ ]Non-invasive ventilatory support [ ]High flow  [ ]Acute  [ ]Chronic [ ]Hypoxic  [ ]Hypercarbic [ ]Other  [ ]Other organ failure     LABS:                        10.8   6.98  )-----------( 388      ( 23 Mar 2022 06:34 )             32.0   03-23    140  |  104  |  8   ----------------------------<  106<H>  3.9   |  26  |  0.80    Ca    9.0      23 Mar 2022 06:34  Phos  3.7     03-23  Mg     2.30     03-23    TPro  6.9  /  Alb  3.4  /  TBili  0.2  /  DBili  x   /  AST  15  /  ALT  13  /  AlkPhos  70  03-22        RADIOLOGY & ADDITIONAL STUDIES: < from: MR Pelvis/Rectal/Anal w/wo IV Cont (03.21.22 @ 09:11) >  A 2.9 cm left perianal abscess. No evidence of associated fistula.    Protein Calorie Malnutrition Present: [ x]mild [ ]moderate [ ]severe [ ]underweight [ ]morbid obesity  https://www.andeal.org/vault/2440/web/files/ONC/Table_Clinical%20Characteristics%20to%20Document%20Malnutrition-White%20JV%20et%20al%319683.pdf    Height (cm): 182.9 (03-18-22 @ 00:12), 180.3 (11-26-21 @ 06:50), 180.3 (11-24-21 @ 10:02)  Weight (kg): 95 (03-18-22 @ 00:12), 101.6 (11-26-21 @ 06:50), 101.6 (11-24-21 @ 10:02)  BMI (kg/m2): 28.4 (03-18-22 @ 00:12), 31.3 (11-26-21 @ 06:50), 31.3 (11-24-21 @ 10:02)    [ ]PPSV2 < or = 30%  [ ]significant weight loss [ ]poor nutritional intake [ ]anasarca    [ ]Artificial Nutrition    REFERRALS:   [ ]Chaplaincy  [ ]Hospice  [ ]Child Life  [ ]Social Work  [ ]Case management [ ]Holistic Therapy

## 2022-03-23 NOTE — PROGRESS NOTE ADULT - PROBLEM SELECTOR PLAN 1
Patient follows with outpatient palliative team: Dr. Colon and Neena Borrego at [facility].   > At home, patient on MS Contin 60mg q8hrs and PO dilaudid 4-8mg q4h prn. He was receiving hyperbaric oxygen treatments outpatient.   > Increase MS Contin 60mg tid to 90mg TID   > On IV dilaudid 1mg q3 hr prn moderate pain   > increase severe PRN to 2mg IV dilaudid q3 hr on 3/22. Has utilized PRNs x6.   > added sitz baths   > increased gabapentin to 300mg tid (9am, 1pm, 9pm) on 3/22   > Pt may benefit from nerve block as outpatient for improved symptom control but he was receiving HBOT. Patient follows with outpatient palliative team: Dr. Colon and Neena Borrego at [facility].   > At home, patient on MS Contin 60mg q8hrs and PO dilaudid 4-8mg q4h prn. He was receiving hyperbaric oxygen treatments outpatient.   > Increase MS Contin 60mg tid to 90mg TID   > On IV dilaudid 1mg q3 hr prn moderate pain   > Continue severe PRNs 2mg IV dilaudid q3 hr on 3/22. Has utilized PRNs x6.   > added sitz baths   > Continue gabapentin to 300mg tid (9am, 1pm, 9pm) on 3/22   > Pt may benefit from nerve block as outpatient for improved symptom control but he was receiving HBOT.

## 2022-03-23 NOTE — PROGRESS NOTE ADULT - SUBJECTIVE AND OBJECTIVE BOX
Patient is a 56y old  Male who presents with a chief complaint of Rectal pain and bleeding, difficulty ambulating (23 Mar 2022 13:36)      SUBJECTIVE / OVERNIGHT EVENTS:    ADDITIONAL REVIEW OF SYSTEMS:    MEDICATIONS  (STANDING):  atorvastatin 10 milliGRAM(s) Oral at bedtime  bictegravir 50 mG/emtricitabine 200 mG/tenofovir alafenamide 25 mG (BIKTARVY) 1 Tablet(s) Oral daily  chlorhexidine 2% Cloths 1 Application(s) Topical daily  gabapentin 300 milliGRAM(s) Oral <User Schedule>  metoprolol succinate ER 50 milliGRAM(s) Oral daily  morphine ER Tablet 90 milliGRAM(s) Oral every 8 hours  OLANZapine 10 milliGRAM(s) Oral at bedtime  piperacillin/tazobactam IVPB.. 3.375 Gram(s) IV Intermittent every 8 hours  polyethylene glycol 3350 17 Gram(s) Oral two times a day  psyllium Powder 1 Packet(s) Oral two times a day  senna 2 Tablet(s) Oral two times a day    MEDICATIONS  (PRN):  acetaminophen     Tablet .. 650 milliGRAM(s) Oral every 6 hours PRN Temp greater or equal to 38C (100.4F), Mild Pain (1 - 3)  ALPRAZolam 1 milliGRAM(s) Oral daily PRN anxiety  HYDROmorphone  Injectable 1 milliGRAM(s) IV Push every 3 hours PRN Moderate Pain (4 - 6)  HYDROmorphone  Injectable 2 milliGRAM(s) IV Push every 3 hours PRN Severe Pain (7 - 10)  zolpidem 5 milliGRAM(s) Oral at bedtime PRN Insomnia  zolpidem 5 milliGRAM(s) Oral at bedtime PRN Insomnia      CAPILLARY BLOOD GLUCOSE        I&O's Summary    22 Mar 2022 07:01  -  23 Mar 2022 07:00  --------------------------------------------------------  IN: 900 mL / OUT: 550 mL / NET: 350 mL        PHYSICAL EXAM:  Vital Signs Last 24 Hrs  T(C): 37.3 (23 Mar 2022 10:00), Max: 37.3 (23 Mar 2022 10:00)  T(F): 99.1 (23 Mar 2022 10:00), Max: 99.1 (23 Mar 2022 10:00)  HR: 102 (23 Mar 2022 10:00) (94 - 104)  BP: 132/90 (23 Mar 2022 10:00) (119/77 - 132/90)  BP(mean): 99 (23 Mar 2022 10:00) (99 - 99)  RR: 18 (23 Mar 2022 10:00) (16 - 18)  SpO2: 97% (23 Mar 2022 10:00) (96% - 98%)  CONSTITUTIONAL: NAD, well-developed, well-groomed  EYES: PERRLA; conjunctiva and sclera clear  ENMT: Moist oral mucosa, no pharyngeal injection or exudates; normal dentition  NECK: Supple, no palpable masses; no thyromegaly  RESPIRATORY: Normal respiratory effort; lungs are clear to auscultation bilaterally  CARDIOVASCULAR: Regular rate and rhythm, normal S1 and S2, no murmur/rub/gallop; No lower extremity edema; Peripheral pulses are 2+ bilaterally  ABDOMEN: Nontender to palpation, normoactive bowel sounds, no rebound/guarding; No hepatosplenomegaly  MUSCULOSKELETAL:  Normal gait; no clubbing or cyanosis of digits; no joint swelling or tenderness to palpation  PSYCH: A+O to person, place, and time; affect appropriate  NEUROLOGY: CN 2-12 are intact and symmetric; no gross sensory deficits   SKIN: No rashes; no palpable lesions    LABS:                        10.8   6.98  )-----------( 388      ( 23 Mar 2022 06:34 )             32.0     03-23    140  |  104  |  8   ----------------------------<  106<H>  3.9   |  26  |  0.80    Ca    9.0      23 Mar 2022 06:34  Phos  3.7     03-23  Mg     2.30     03-23    TPro  6.9  /  Alb  3.4  /  TBili  0.2  /  DBili  x   /  AST  15  /  ALT  13  /  AlkPhos  70  03-22                RADIOLOGY & ADDITIONAL TESTS:  Results Reviewed:   Imaging Personally Reviewed:  Electrocardiogram Personally Reviewed:    COORDINATION OF CARE:  Care Discussed with Consultants/Other Providers [Y/N]:  Prior or Outpatient Records Reviewed [Y/N]:

## 2022-03-23 NOTE — PROGRESS NOTE ADULT - ASSESSMENT
56 m with HTN< HIV on Biktarvy, last VL:UD and CD4>900, prostate ca 2016 s/p radiation, anal cancer (dx 4/2021) s/p radiation and chemotherapy (last 8/2021) complicated by an ulcer,  p/w rectal bleeding and pain, also b/l inguinal pain  afebrile, tmax: 100 but tachy, WBC normal  CT: Limited evaluation for residual sinus tract. A small droplet of gas is noted in the left intersphincteric space with associated edema, similar in appearance to prior CT dated 1/18/2022. There is an appearance concerning for residual tract  blood and urine cx negative  CD4 now 239    HIV on Biktarvy, VL UD, CD4 now 239 (was >900 before)  anal Ca s/p chemo and RT with rectal ulcer, now with rectal pain and bleeding, CT with unchanged gas and edema in the L intersphincteric space, ?residual tract, MRI showed a 2.9 cm perianal abscess, no fistula  b/l inguinal pain, unclear etiology, LS spine MRI with disc bulges with L4-L5, mild to mod spinal stenosis    * f/u with colorectal regarding perianal abscess and if needs to be drained  * c/w zosyn, started 3/18, now day 6, if no drainage will do a total 2 weeks when ready for discharge, can switch to PO cipro 500 q 12 and flagyl 500 q 12 to complete the course  * c/w biktarvy      The above assessment and plan was discussed with the primary team    Denise Gordon MD  contact on teams  After 5pm and on weekends call [phone]

## 2022-03-23 NOTE — PROGRESS NOTE ADULT - NSPROGADDITIONALINFOA_GEN_ALL_CORE
3/23/22 @ 2pm- I spoke with patient's wife Trinity via phone. Informed her of MRI results, including perianal abscess and L4-L5 disc bulges + spinal stenosis. Colorectal surgery recs still pending. ID following and discharge antibiotic recs received. PT rec home PT which wife is in agreement with

## 2022-03-23 NOTE — PROGRESS NOTE ADULT - PROBLEM SELECTOR PLAN 2
Patient reports difficulty with BM despite aggressive bowel regimen medications.   > Last BM 3/22. continue to monitor BMs   > On relistor daily per outpatient records- 450mg PO qday in AM   > continue miralax bid, senna 2 tabs bid.

## 2022-03-23 NOTE — PROGRESS NOTE ADULT - PROBLEM SELECTOR PLAN 6
Thank you for allowing us to participate in your patient's care. We will continue to follow with you. Please page [phone] for any q's or c's.     Terri Roberts D.O.   Palliative Medicine.

## 2022-03-23 NOTE — PROGRESS NOTE ADULT - SUBJECTIVE AND OBJECTIVE BOX
Follow Up:  HIV, rectal bleeding, ?collection    Interval History: pt afebrile, no acute events, pelvis MRI showed a 2.9 cm perianal abscess    ROS:      All other systems negative    Constitutional: no fever, no chills  Cardiovascular:  no chest pain, no palpitation  Respiratory:  no SOB, no cough  GI:  no abd pain, no vomiting, improved rectal pain  urinary: no dysuria, no hematuria, no flank pain  musculoskeletal: b/l inguinal pain  skin:  no rash  neurology:  no headache, no seizure          Allergies  No Known Allergies        ANTIMICROBIALS:  bictegravir 50 mG/emtricitabine 200 mG/tenofovir alafenamide 25 mG (BIKTARVY) 1 daily  piperacillin/tazobactam IVPB.. 3.375 every 8 hours      OTHER MEDS:  acetaminophen     Tablet .. 650 milliGRAM(s) Oral every 6 hours PRN  ALPRAZolam 1 milliGRAM(s) Oral daily PRN  atorvastatin 10 milliGRAM(s) Oral at bedtime  chlorhexidine 2% Cloths 1 Application(s) Topical daily  gabapentin 300 milliGRAM(s) Oral <User Schedule>  HYDROmorphone  Injectable 1 milliGRAM(s) IV Push every 3 hours PRN  HYDROmorphone  Injectable 2 milliGRAM(s) IV Push every 3 hours PRN  metoprolol succinate ER 50 milliGRAM(s) Oral daily  morphine ER Tablet 90 milliGRAM(s) Oral every 8 hours  OLANZapine 10 milliGRAM(s) Oral at bedtime  polyethylene glycol 3350 17 Gram(s) Oral two times a day  psyllium Powder 1 Packet(s) Oral two times a day  senna 2 Tablet(s) Oral two times a day  zolpidem 5 milliGRAM(s) Oral at bedtime PRN  zolpidem 5 milliGRAM(s) Oral at bedtime PRN      Vital Signs Last 24 Hrs  T(C): 37.3 (23 Mar 2022 10:00), Max: 37.3 (23 Mar 2022 10:00)  T(F): 99.1 (23 Mar 2022 10:00), Max: 99.1 (23 Mar 2022 10:00)  HR: 102 (23 Mar 2022 10:00) (94 - 104)  BP: 132/90 (23 Mar 2022 10:00) (119/77 - 132/90)  BP(mean): 99 (23 Mar 2022 10:00) (99 - 99)  RR: 18 (23 Mar 2022 10:00) (16 - 18)  SpO2: 97% (23 Mar 2022 10:00) (96% - 98%)    Physical Exam:  General:    NAD,  non toxic  Cardio:     regular S1, S2  Respiratory:    clear b/l,    no wheezing  abd:     soft,   BS +,   no tenderness  :   no CVAT,  no suprapubic tenderness,   no  davis  Musculoskeletal:   no joint swelling  vascular: no phlebitis  Skin:    no rash                        10.8   6.98  )-----------( 388      ( 23 Mar 2022 06:34 )             32.0       03-23    140  |  104  |  8   ----------------------------<  106<H>  3.9   |  26  |  0.80    Ca    9.0      23 Mar 2022 06:34  Phos  3.7     03-23  Mg     2.30     03-23    TPro  6.9  /  Alb  3.4  /  TBili  0.2  /  DBili  x   /  AST  15  /  ALT  13  /  AlkPhos  70  03-22          MICROBIOLOGY:  v  Clean Catch Clean Catch (Midstream)  03-17-22   No growth  --  --      .Blood Blood-Peripheral  03-17-22   No Growth Final  --  --      .Blood Blood-Peripheral  03-17-22   No Growth Final  --  --        HIV-1 RNA Quantitative, Viral Load Log: NOT DET. lg /mL (03-18-22 @ 12:31)    Rapid RVP Result: NotDetec (03-17 @ 13:34)        RADIOLOGY:  Images independently visualized and reviewed personally, findings as below  < from: MR Pelvis/Rectal/Anal w/wo IV Cont (03.21.22 @ 09:11) >  IMPRESSION:  A 2.9 cm left perianal abscess. No evidence of associated fistula.      < end of copied text >

## 2022-03-23 NOTE — PROGRESS NOTE ADULT - SUBJECTIVE AND OBJECTIVE BOX
A Team Progress Note  [id number]    Subjective:   Patient seen at bedside this AM. Reports pain is stable in perineum and perianal area. No n/v, tolerating diet.    Objective:  Vital Signs  T(C): 37 (03-23 @ 05:42), Max: 37.2 (03-22 @ 14:15)  HR: 97 (03-23 @ 05:42) (94 - 104)  BP: 119/77 (03-23 @ 05:42) (119/77 - 132/86)  RR: 16 (03-23 @ 05:42) (16 - 17)  SpO2: 98% (03-23 @ 05:42) (96% - 99%)  03-22-22 @ 07:01  -  03-23-22 @ 07:00  --------------------------------------------------------  IN:  Total IN: 0 mL    OUT:    Voided (mL): 550 mL  Total OUT: 550 mL    Total NET: -550 mL          I&O's Detail    03-22-22 @ 07:01  -  03-23-22 @ 07:00  --------------------------------------------------------  IN: 900 mL / OUT: 550 mL / NET: 350 mL        Physical Exam:  GEN: resting in bed comfortably in NAD  RESP: no increased WOB  ABD: soft, non-distended, non-tender  EXTR: warm, well-perfused    Labs:                        10.8   6.98  )-----------( 388      ( 23 Mar 2022 06:34 )             32.0   03-23    140  |  104  |  8   ----------------------------<  106<H>  3.9   |  26  |  0.80    Ca    9.0      23 Mar 2022 06:34  Phos  3.7     03-23  Mg     2.30     03-23    TPro  6.9  /  Alb  3.4  /  TBili  0.2  /  DBili  x   /  AST  15  /  ALT  13  /  AlkPhos  70  03-22    CAPILLARY BLOOD GLUCOSE          Medications:   MEDICATIONS  (STANDING):  atorvastatin 10 milliGRAM(s) Oral at bedtime  bictegravir 50 mG/emtricitabine 200 mG/tenofovir alafenamide 25 mG (BIKTARVY) 1 Tablet(s) Oral daily  chlorhexidine 2% Cloths 1 Application(s) Topical daily  gabapentin 300 milliGRAM(s) Oral <User Schedule>  metoprolol succinate ER 50 milliGRAM(s) Oral daily  morphine ER Tablet 60 milliGRAM(s) Oral every 8 hours  OLANZapine 10 milliGRAM(s) Oral at bedtime  piperacillin/tazobactam IVPB.. 3.375 Gram(s) IV Intermittent every 8 hours  polyethylene glycol 3350 17 Gram(s) Oral two times a day  psyllium Powder 1 Packet(s) Oral two times a day  senna 2 Tablet(s) Oral two times a day    MEDICATIONS  (PRN):  acetaminophen     Tablet .. 650 milliGRAM(s) Oral every 6 hours PRN Temp greater or equal to 38C (100.4F), Mild Pain (1 - 3)  ALPRAZolam 1 milliGRAM(s) Oral daily PRN anxiety  HYDROmorphone  Injectable 2 milliGRAM(s) IV Push every 3 hours PRN Severe Pain (7 - 10)  HYDROmorphone  Injectable 1 milliGRAM(s) IV Push every 3 hours PRN Moderate Pain (4 - 6)  zolpidem 5 milliGRAM(s) Oral at bedtime PRN Insomnia  zolpidem 5 milliGRAM(s) Oral at bedtime PRN Insomnia      Imaging:

## 2022-03-24 ENCOUNTER — TRANSCRIPTION ENCOUNTER (OUTPATIENT)
Age: 57
End: 2022-03-24

## 2022-03-24 ENCOUNTER — APPOINTMENT (OUTPATIENT)
Dept: HYPERBARIC MEDICINE | Facility: CLINIC | Age: 57
End: 2022-03-24

## 2022-03-24 LAB
ANION GAP SERPL CALC-SCNC: 9 MMOL/L — SIGNIFICANT CHANGE UP (ref 7–14)
BASOPHILS # BLD AUTO: 0.04 K/UL — SIGNIFICANT CHANGE UP (ref 0–0.2)
BASOPHILS NFR BLD AUTO: 0.5 % — SIGNIFICANT CHANGE UP (ref 0–2)
BUN SERPL-MCNC: 12 MG/DL — SIGNIFICANT CHANGE UP (ref 7–23)
CALCIUM SERPL-MCNC: 9.1 MG/DL — SIGNIFICANT CHANGE UP (ref 8.4–10.5)
CHLORIDE SERPL-SCNC: 99 MMOL/L — SIGNIFICANT CHANGE UP (ref 98–107)
CO2 SERPL-SCNC: 26 MMOL/L — SIGNIFICANT CHANGE UP (ref 22–31)
CREAT SERPL-MCNC: 0.85 MG/DL — SIGNIFICANT CHANGE UP (ref 0.5–1.3)
EGFR: 103 ML/MIN/1.73M2 — SIGNIFICANT CHANGE UP
EOSINOPHIL # BLD AUTO: 0.03 K/UL — SIGNIFICANT CHANGE UP (ref 0–0.5)
EOSINOPHIL NFR BLD AUTO: 0.4 % — SIGNIFICANT CHANGE UP (ref 0–6)
GLUCOSE SERPL-MCNC: 91 MG/DL — SIGNIFICANT CHANGE UP (ref 70–99)
HCT VFR BLD CALC: 33 % — LOW (ref 39–50)
HGB BLD-MCNC: 11.1 G/DL — LOW (ref 13–17)
IANC: 4.79 K/UL — SIGNIFICANT CHANGE UP (ref 1.8–7.4)
IMM GRANULOCYTES NFR BLD AUTO: 0.5 % — SIGNIFICANT CHANGE UP (ref 0–1.5)
LYMPHOCYTES # BLD AUTO: 1.75 K/UL — SIGNIFICANT CHANGE UP (ref 1–3.3)
LYMPHOCYTES # BLD AUTO: 23.2 % — SIGNIFICANT CHANGE UP (ref 13–44)
MAGNESIUM SERPL-MCNC: 2.3 MG/DL — SIGNIFICANT CHANGE UP (ref 1.6–2.6)
MCHC RBC-ENTMCNC: 28.8 PG — SIGNIFICANT CHANGE UP (ref 27–34)
MCHC RBC-ENTMCNC: 33.6 GM/DL — SIGNIFICANT CHANGE UP (ref 32–36)
MCV RBC AUTO: 85.7 FL — SIGNIFICANT CHANGE UP (ref 80–100)
MONOCYTES # BLD AUTO: 0.9 K/UL — SIGNIFICANT CHANGE UP (ref 0–0.9)
MONOCYTES NFR BLD AUTO: 11.9 % — SIGNIFICANT CHANGE UP (ref 2–14)
NEUTROPHILS # BLD AUTO: 4.79 K/UL — SIGNIFICANT CHANGE UP (ref 1.8–7.4)
NEUTROPHILS NFR BLD AUTO: 63.5 % — SIGNIFICANT CHANGE UP (ref 43–77)
NRBC # BLD: 0 /100 WBCS — SIGNIFICANT CHANGE UP
NRBC # FLD: 0 K/UL — SIGNIFICANT CHANGE UP
PHOSPHATE SERPL-MCNC: 3.9 MG/DL — SIGNIFICANT CHANGE UP (ref 2.5–4.5)
PLATELET # BLD AUTO: 409 K/UL — HIGH (ref 150–400)
POTASSIUM SERPL-MCNC: 3.7 MMOL/L — SIGNIFICANT CHANGE UP (ref 3.5–5.3)
POTASSIUM SERPL-SCNC: 3.7 MMOL/L — SIGNIFICANT CHANGE UP (ref 3.5–5.3)
RBC # BLD: 3.85 M/UL — LOW (ref 4.2–5.8)
RBC # FLD: 13.6 % — SIGNIFICANT CHANGE UP (ref 10.3–14.5)
SARS-COV-2 RNA SPEC QL NAA+PROBE: SIGNIFICANT CHANGE UP
SARS-COV-2 RNA SPEC QL NAA+PROBE: SIGNIFICANT CHANGE UP
SODIUM SERPL-SCNC: 134 MMOL/L — LOW (ref 135–145)
WBC # BLD: 7.55 K/UL — SIGNIFICANT CHANGE UP (ref 3.8–10.5)
WBC # FLD AUTO: 7.55 K/UL — SIGNIFICANT CHANGE UP (ref 3.8–10.5)

## 2022-03-24 PROCEDURE — 99233 SBSQ HOSP IP/OBS HIGH 50: CPT

## 2022-03-24 PROCEDURE — 99232 SBSQ HOSP IP/OBS MODERATE 35: CPT

## 2022-03-24 RX ORDER — HYDROMORPHONE HYDROCHLORIDE 2 MG/ML
1 INJECTION INTRAMUSCULAR; INTRAVENOUS; SUBCUTANEOUS
Refills: 0 | Status: DISCONTINUED | OUTPATIENT
Start: 2022-03-24 | End: 2022-03-28

## 2022-03-24 RX ORDER — MORPHINE SULFATE 50 MG/1
90 CAPSULE, EXTENDED RELEASE ORAL EVERY 8 HOURS
Refills: 0 | Status: DISCONTINUED | OUTPATIENT
Start: 2022-03-24 | End: 2022-03-30

## 2022-03-24 RX ORDER — HYDROMORPHONE HYDROCHLORIDE 2 MG/ML
2 INJECTION INTRAMUSCULAR; INTRAVENOUS; SUBCUTANEOUS
Refills: 0 | Status: DISCONTINUED | OUTPATIENT
Start: 2022-03-24 | End: 2022-03-25

## 2022-03-24 RX ORDER — ZOLPIDEM TARTRATE 10 MG/1
5 TABLET ORAL AT BEDTIME
Refills: 0 | Status: DISCONTINUED | OUTPATIENT
Start: 2022-03-24 | End: 2022-03-30

## 2022-03-24 RX ORDER — ALPRAZOLAM 0.25 MG
1 TABLET ORAL DAILY
Refills: 0 | Status: DISCONTINUED | OUTPATIENT
Start: 2022-03-24 | End: 2022-03-30

## 2022-03-24 RX ORDER — SODIUM CHLORIDE 9 MG/ML
1000 INJECTION INTRAMUSCULAR; INTRAVENOUS; SUBCUTANEOUS
Refills: 0 | Status: DISCONTINUED | OUTPATIENT
Start: 2022-03-24 | End: 2022-03-28

## 2022-03-24 RX ADMIN — MORPHINE SULFATE 90 MILLIGRAM(S): 50 CAPSULE, EXTENDED RELEASE ORAL at 05:42

## 2022-03-24 RX ADMIN — MORPHINE SULFATE 90 MILLIGRAM(S): 50 CAPSULE, EXTENDED RELEASE ORAL at 13:26

## 2022-03-24 RX ADMIN — HYDROMORPHONE HYDROCHLORIDE 2 MILLIGRAM(S): 2 INJECTION INTRAMUSCULAR; INTRAVENOUS; SUBCUTANEOUS at 13:54

## 2022-03-24 RX ADMIN — HYDROMORPHONE HYDROCHLORIDE 2 MILLIGRAM(S): 2 INJECTION INTRAMUSCULAR; INTRAVENOUS; SUBCUTANEOUS at 15:54

## 2022-03-24 RX ADMIN — PIPERACILLIN AND TAZOBACTAM 25 GRAM(S): 4; .5 INJECTION, POWDER, LYOPHILIZED, FOR SOLUTION INTRAVENOUS at 17:16

## 2022-03-24 RX ADMIN — ZOLPIDEM TARTRATE 5 MILLIGRAM(S): 10 TABLET ORAL at 22:24

## 2022-03-24 RX ADMIN — HYDROMORPHONE HYDROCHLORIDE 2 MILLIGRAM(S): 2 INJECTION INTRAMUSCULAR; INTRAVENOUS; SUBCUTANEOUS at 11:00

## 2022-03-24 RX ADMIN — GABAPENTIN 300 MILLIGRAM(S): 400 CAPSULE ORAL at 13:26

## 2022-03-24 RX ADMIN — BICTEGRAVIR SODIUM, EMTRICITABINE, AND TENOFOVIR ALAFENAMIDE FUMARATE 1 TABLET(S): 30; 120; 15 TABLET ORAL at 22:20

## 2022-03-24 RX ADMIN — HYDROMORPHONE HYDROCHLORIDE 2 MILLIGRAM(S): 2 INJECTION INTRAMUSCULAR; INTRAVENOUS; SUBCUTANEOUS at 01:16

## 2022-03-24 RX ADMIN — ATORVASTATIN CALCIUM 10 MILLIGRAM(S): 80 TABLET, FILM COATED ORAL at 22:20

## 2022-03-24 RX ADMIN — MORPHINE SULFATE 90 MILLIGRAM(S): 50 CAPSULE, EXTENDED RELEASE ORAL at 14:26

## 2022-03-24 RX ADMIN — HYDROMORPHONE HYDROCHLORIDE 2 MILLIGRAM(S): 2 INJECTION INTRAMUSCULAR; INTRAVENOUS; SUBCUTANEOUS at 06:31

## 2022-03-24 RX ADMIN — POLYETHYLENE GLYCOL 3350 17 GRAM(S): 17 POWDER, FOR SOLUTION ORAL at 06:05

## 2022-03-24 RX ADMIN — GABAPENTIN 300 MILLIGRAM(S): 400 CAPSULE ORAL at 21:00

## 2022-03-24 RX ADMIN — HYDROMORPHONE HYDROCHLORIDE 2 MILLIGRAM(S): 2 INJECTION INTRAMUSCULAR; INTRAVENOUS; SUBCUTANEOUS at 10:27

## 2022-03-24 RX ADMIN — PIPERACILLIN AND TAZOBACTAM 25 GRAM(S): 4; .5 INJECTION, POWDER, LYOPHILIZED, FOR SOLUTION INTRAVENOUS at 08:53

## 2022-03-24 RX ADMIN — HYDROMORPHONE HYDROCHLORIDE 2 MILLIGRAM(S): 2 INJECTION INTRAMUSCULAR; INTRAVENOUS; SUBCUTANEOUS at 18:37

## 2022-03-24 RX ADMIN — MORPHINE SULFATE 90 MILLIGRAM(S): 50 CAPSULE, EXTENDED RELEASE ORAL at 06:42

## 2022-03-24 RX ADMIN — HYDROMORPHONE HYDROCHLORIDE 2 MILLIGRAM(S): 2 INJECTION INTRAMUSCULAR; INTRAVENOUS; SUBCUTANEOUS at 06:16

## 2022-03-24 RX ADMIN — MORPHINE SULFATE 90 MILLIGRAM(S): 50 CAPSULE, EXTENDED RELEASE ORAL at 22:24

## 2022-03-24 RX ADMIN — OLANZAPINE 10 MILLIGRAM(S): 15 TABLET, FILM COATED ORAL at 22:20

## 2022-03-24 RX ADMIN — GABAPENTIN 300 MILLIGRAM(S): 400 CAPSULE ORAL at 08:53

## 2022-03-24 RX ADMIN — ZOLPIDEM TARTRATE 5 MILLIGRAM(S): 10 TABLET ORAL at 01:11

## 2022-03-24 RX ADMIN — PIPERACILLIN AND TAZOBACTAM 25 GRAM(S): 4; .5 INJECTION, POWDER, LYOPHILIZED, FOR SOLUTION INTRAVENOUS at 01:08

## 2022-03-24 RX ADMIN — Medication 1 MILLIGRAM(S): at 19:35

## 2022-03-24 RX ADMIN — CHLORHEXIDINE GLUCONATE 1 APPLICATION(S): 213 SOLUTION TOPICAL at 12:31

## 2022-03-24 RX ADMIN — Medication 50 MILLIGRAM(S): at 05:43

## 2022-03-24 RX ADMIN — HYDROMORPHONE HYDROCHLORIDE 2 MILLIGRAM(S): 2 INJECTION INTRAMUSCULAR; INTRAVENOUS; SUBCUTANEOUS at 01:31

## 2022-03-24 NOTE — DISCHARGE NOTE PROVIDER - NSDCMRMEDTOKEN_GEN_ALL_CORE_FT
ALPRAZOLAM 1MG TAB:   aspirin 81 mg oral tablet: 1 tab(s) orally once a day  atorvastatin 10 mg oral tablet: 1 tab(s) orally once a day  Biktarvy oral tablet: 1 tab(s) orally once a day  GABAPENTIN 100MG CAP: 2 cap(s) orally 2 times a day  GABAPENTIN 300MG CAP: 1 cap(s) orally once a day (at bedtime)  HYDROMORPHONE HCL 4MG TAB: 2 tab(s) orally every 4 hours  LACTULOSE 10 GM/15 ML SOLUTION: TAKE 15 ML EVERY 6 HOURS AS NEEDED UNTIL HAVING REGULAR BOWEL MOVEMENTS  losartan 25 mg oral tablet: 2 tab(s) orally once a day  metFORMIN:   metoprolol-hydrochlorothiazide 50 mg-25 mg oral tablet: 1 tab(s) orally once a day  MORPHINE SULFATE ER 60MG/12 TAB: 1 tab(s) orally every 8 hours  OLANZapine 10 mg oral tablet: 1 tab(s) orally once a day (at bedtime)  Relistor 150 mg oral tablet: 3 tab(s) orally once a day (in the morning)  STOOL SOFTENER 100MG CAP: 1 cap(s) orally 3 times a day  zolpidem 10 mg oral tablet: 1 tab(s) orally once a day (at bedtime)   ALPRAZOLAM 1MG TAB:   aspirin 81 mg oral tablet: 1 tab(s) orally once a day  atorvastatin 10 mg oral tablet: 1 tab(s) orally once a day  Biktarvy oral tablet: 1 tab(s) orally once a day  GABAPENTIN 100MG CAP: 2 cap(s) orally 2 times a day  GABAPENTIN 300MG CAP: 1 cap(s) orally once a day (at bedtime)  HYDROmorphone 4 mg oral tablet: 1 tab orally every 4 hours, As needed, Moderate Pain and/or severe pain  ISTOP [id number] MDD:6 tabs  HYDROMORPHONE HCL 4MG TAB: 2 tab(s) orally every 4 hours  LACTULOSE 10 GM/15 ML SOLUTION: TAKE 15 ML EVERY 6 HOURS AS NEEDED UNTIL HAVING REGULAR BOWEL MOVEMENTS  losartan 25 mg oral tablet: 2 tab(s) orally once a day  metFORMIN:   metoprolol-hydrochlorothiazide 50 mg-25 mg oral tablet: 1 tab(s) orally once a day  MORPHINE SULFATE ER 60MG/12 TAB: 1 tab(s) orally every 8 hours  MS Contin 30 mg oral tablet, extended release: 3 tabs orally every 8 hours   ISTOP [id number] MDD:9 tabs  PRICE CHECK [id number]  OLANZapine 10 mg oral tablet: 1 tab(s) orally once a day (at bedtime)  Relistor 150 mg oral tablet: 3 tab(s) orally once a day (in the morning)  STOOL SOFTENER 100MG CAP: 1 cap(s) orally 3 times a day  zolpidem 10 mg oral tablet: 1 tab(s) orally once a day (at bedtime)   acetaminophen 325 mg oral tablet: 2 tab(s) orally every 6 hours, As needed, Temp greater or equal to 38C (100.4F), Mild Pain (1 - 3)  ALPRAZolam 1 mg oral tablet: 1 tab(s) orally once a day, As needed, anxiety  atorvastatin 10 mg oral tablet: 1 tab(s) orally once a day  bictegravir/emtricitabine/tenofovir 50 mg-200 mg-25 mg oral tablet: 1 tab(s) orally once a day  gabapentin 300 mg oral capsule: 1 cap(s) orally 3 times a day   HYDROmorphone 4 mg oral tablet: 1 tab orally every 4 hours, As needed, Moderate Pain and/or severe pain  ISTOP [id number] MDD:6 tabs  metFORMIN:   metoprolol succinate 50 mg oral tablet, extended release: 1 tab(s) orally once a day  MS Contin 30 mg oral tablet, extended release: 3 tabs orally every 8 hours   ISTOP [id number] MDD:9 tabs  PRICE CHECK [id number]  OLANZapine 10 mg oral tablet: 1 tab(s) orally once a day (at bedtime)  polyethylene glycol 3350 oral powder for reconstitution: 17 gram(s) orally 2 times a day  senna oral tablet: 2 tab(s) orally once a day (at bedtime)  zolpidem 10 mg oral tablet: 1 tab(s) orally once a day (at bedtime)

## 2022-03-24 NOTE — DISCHARGE NOTE PROVIDER - NSFOLLOWUPCLINICS_GEN_ALL_ED_FT
[facility] - Infectious [facility]  Infectious Disease  [address], [place] [address]  Phone: [phone]  Fax:

## 2022-03-24 NOTE — PROGRESS NOTE ADULT - PROBLEM SELECTOR PLAN 1
-patient with acute on chronic rectal pain associated w/ hematochezia and constipation  -pain in setting of multiple radiation treatments to the area due to h/o anal cancer and prostate cancer  -chronically on MS contin 60mg q8 and dilaudid PO 8mg q4, which has not been adequately controlling the pain  -pain associated with yellow drainage from the area  -CT scan limited for evaluation of sinus tract  - MRI showing perianal abscess. colorectal surgery planning for EUA tomorrow  - Continue zosyn for now. ID rec Cipro and flagyl on discharge   - Continue IV Dilaudid prn. Will transition to PO after surgery   - Palliative consult following  for pain management  - c/w bowel regimen (home relistor now given iv)

## 2022-03-24 NOTE — PROGRESS NOTE ADULT - ASSESSMENT
56yM w/ Possible perianal fistula    Recommendations:  - c/w current medical management  - Will review MRI  - Plan for EUA tomorrow for perirectal abscess    - Please page [phone] w/ any questions   56yM w/ Possible perianal fistula    Recommendations:  - c/w current medical management  - Plan for EUA tomorrow for perirectal abscess    - Please page [phone] w/ any questions

## 2022-03-24 NOTE — DISCHARGE NOTE PROVIDER - HOSPITAL COURSE
55 y/o M w/ PMH rectal cancer s/p radiation, prostate cancer s/p radiation, HIV on biktarvy, diverticulosis w/ h/o diverticulitis, anxiety, depression, HTN, chronic constipation presents with uncontrollable rectal pain, bleeding, subjective fevers, chills, diaphoresis, and difficulty ambulating.    1. Rectal or anal pain secondary to anal & prostate cancer  - Acute on chronic rectal pain associated w/ hematochezia and constipation s/p multiple radiation treatments to the area due to h/o anal cancer and prostate cancer  - Chronically on MS contin 60mg q8 and Dilaudid PO 8mg q4, which has not been adequately controlling the pain  - MRI perianal abscess- OR 3/25 severe induration seen circumferentially with ulcerated mucosa and recessed cavity on left side inferior to true lumen. No definite abscess seen therefore no drainage or sphincterotomy was performed.   - S/p lap colostomy (3/30) POD # 2    - Completed IV course of Zosyn  - Continue taking MS CONTIN 90 mg TID and DILAUDID 4mg PO q4hr prn moderate to severe pain upon discharge   - Continue with sitz baths  - Continue gabapentin 300mg tid    - Continue with wound care at site of colostomy  - Follow up with Dr. Avilez from surgery in 2 weeks    2. SIRS (systemic inflammatory response syndrome)  - When admitted, patient met SIRS criteria w/ tachycardia and tachypnea; now improved  - Completed 7 day course of IV Zosyn (3/25 - 3/31)  - Seen by infectious disease specialist  - Bcx negative thus far     3. Gait instability  - Acute on chronic gait instability likely in the setting of anal pain radiating down from legs  - Neuro exam benign, 5/5 in UE b/l, 5/5 strength in RLE, and 4/5 strength in LLE which is patient's baseline  - Ambulating with cane  - Seen by PT; recommend home with home PT and rolling walker    Anemia  - likely ACD from malignancy vs KAREN, however patient does have blood in toilet after bowel movements and blood on toilet paper  - Hgb 11.4 from 12.9 in 1/22, 13.8 in 10/21  - likely 2/2 to AOCD    HIV disease  - patient compliant with biktarvy, will continue inpatient; CD4 count 947 in 5/21, now 239  - undetectable viral load   - ID following, recs continuing Biktarvy    Major depression  - continue olanzapine 10mg daily    Anxiety  - continue home xanax 1mg PRN    Insomnia   - continue home zolpidem 10mg PRN    HTN (hypertension)  - BPs soft upon arrival to ED; will continue metoprolol 50 given tachycardia and hold HCTZ and losartan    HLD (hyperlipidemia)  - continue atorvastatin 10    Prophylactic measure  - SCDs for now given reported blood in his stool   - will consider starting Lovenox if MRI abdomen neg   55 y/o M w/ PMH rectal cancer s/p radiation, prostate cancer s/p radiation, HIV on biktarvy, diverticulosis w/ h/o diverticulitis, anxiety, depression, HTN, chronic constipation presents with uncontrollable rectal pain, bleeding, subjective fevers, chills, diaphoresis, and difficulty ambulating.    1. Rectal or anal pain secondary to anal & prostate cancer  - Acute on chronic rectal pain associated w/ hematochezia and constipation s/p multiple radiation treatments to the area due to h/o anal cancer and prostate cancer  - Chronically on MS contin 60mg q8 and Dilaudid PO 8mg q4, which has not been adequately controlling the pain  - MRI perianal abscess- OR 3/25 severe induration seen circumferentially with ulcerated mucosa and recessed cavity on left side inferior to true lumen. No definite abscess seen therefore no drainage or sphincterotomy was performed.   - S/p lap colostomy (3/30)   - Completed IV course of Zosyn  - Continue taking MS CONTIN 90 mg TID and DILAUDID 4mg PO q4hr prn moderate to severe pain upon discharge   - Continue with sitz baths  - Continue gabapentin 300mg tid    - Seen by wound care nurse; continue with wound care at site of colostomy  - Follow up with Dr. Avilez from surgery in 2 weeks    2. SIRS (systemic inflammatory response syndrome)  - When admitted, patient met SIRS criteria w/ tachycardia and tachypnea; now improved  - Completed 7 day course of IV Zosyn (3/25 - 3/31)  - Seen by infectious disease specialist  - Bcx negative thus far     3. Gait instability  - Acute on chronic gait instability likely in the setting of anal pain radiating down from legs  - Neuro exam benign, 5/5 in UE b/l, 5/5 strength in RLE, and 4/5 strength in LLE which is patient's baseline  - Ambulating with cane  - Seen by PT; recommend home with home PT and rolling walker    4. HTN (hypertension)  - Continue metoprolol 50 mg  - Held HCTZ and losartan given tachycardia    5. Anemia  - Likely ACD from malignancy vs KAREN, however patient does have blood in toilet after bowel movements and blood on toilet paper  - Hgb 11.3 from 12.9 in 1/22, 13.8 in 10/21  - likely 2/2 to AOCD    6. HIV disease  - Patient compliant with biktarvy, will continue inpatient  - CD4 count 947 in 5/21, now 239  - Undetectable viral load   - Seen by jesse DODD continuing Biktarvy    7. Major depression  - continue olanzapine 10mg daily    8. Anxiety  - continue home xanax 1mg PRN    9. Insomnia   - continue home zolpidem 10mg PRN    10. HLD (hyperlipidemia)  - Continue atorvastatin 10 mg 55 y/o M w/ PMH rectal cancer s/p radiation, prostate cancer s/p radiation, HIV on biktarvy, diverticulosis w/ h/o diverticulitis, anxiety, depression, HTN, chronic constipation presents with uncontrollable rectal pain, bleeding, subjective fevers, chills, diaphoresis, and difficulty ambulating.    1. Rectal or anal pain secondary to anal & prostate cancer  - Acute on chronic rectal pain associated w/ hematochezia and constipation s/p multiple radiation treatments to the area due to h/o anal cancer and prostate cancer  - Chronically on MS contin 60mg q8 and Dilaudid PO 8mg q4, which has not been adequately controlling the pain  - MRI perianal abscess- OR 3/25 severe induration seen circumferentially with ulcerated mucosa and recessed cavity on left side inferior to true lumen. No definite abscess seen therefore no drainage or sphincterotomy was performed.   - S/p lap colostomy (3/30)   - Completed IV course of Zosyn  - Continue taking MS CONTIN 90 mg TID and DILAUDID 4mg PO q4hr prn moderate to severe pain upon discharge   - Continue with sitz baths  - Continue gabapentin 300mg tid    - Seen by wound care nurse; continue with wound care at site of colostomy  - Follow up with Dr. Avilez from surgery in 2 weeks    2. SIRS (systemic inflammatory response syndrome)  - When admitted, patient met SIRS criteria w/ tachycardia and tachypnea; now improved  - Completed 7 day course of IV Zosyn (3/25 - 3/31)  - Seen by infectious disease specialist  - Bcx negative thus far     3. Gait instability  - Acute on chronic gait instability likely in the setting of anal pain radiating down from legs  - Neuro exam benign, 5/5 in UE b/l, 5/5 strength in RLE, and 4/5 strength in LLE which is patient's baseline  - Ambulating with cane  - Seen by PT; recommend home with home PT and rolling walker    4. HTN (hypertension)  - Continue metoprolol 50 mg  - Held HCTZ and losartan given tachycardia    5. Anemia  - Likely ACD from malignancy vs KAREN, however patient does have blood in toilet after bowel movements and blood on toilet paper  - Hgb 11.3 from 12.9 in 1/22, 13.8 in 10/21  - likely 2/2 to AOCD    6. HIV disease  - Patient compliant with biktarvy, will continue inpatient  - CD4 count 947 in 5/21, now 239  - Undetectable viral load   - Seen by jesse DODD continuing Biktarvy    7. Major depression  - continue olanzapine 10mg daily    8. Anxiety  - continue home xanax 1mg PRN    9. Insomnia   - continue home zolpidem 10mg PRN    10. HLD (hyperlipidemia)  - Continue atorvastatin 10 mg    Discussed with  ___ on ____. Pt stable for discharge. Medications reviewed. 57 y/o M w/ PMH rectal cancer s/p radiation, prostate cancer s/p radiation, HIV on biktarvy, diverticulosis w/ h/o diverticulitis, anxiety, depression, HTN, chronic constipation presents with uncontrollable rectal pain, bleeding, subjective fevers, chills, diaphoresis, and difficulty ambulating.    1. Rectal or anal pain secondary to anal & prostate cancer  - Acute on chronic rectal pain associated w/ hematochezia and constipation s/p multiple radiation treatments to the area due to h/o anal cancer and prostate cancer  - Chronically on MS contin 60mg q8 and Dilaudid PO 8mg q4, which has not been adequately controlling the pain  - MRI perianal abscess- OR 3/25 severe induration seen circumferentially with ulcerated mucosa and recessed cavity on left side inferior to true lumen. No definite abscess seen therefore no drainage or sphincterotomy was performed.   - S/p lap colostomy (3/30)   - Completed IV course of Zosyn  - Continue taking MS CONTIN 90 mg TID and DILAUDID 4mg PO q4hr prn moderate to severe pain upon discharge   - Continue with sitz baths  - Continue gabapentin 300mg tid    - Seen by wound care nurse; continue with wound care at site of colostomy  - Follow up with Dr. Avilez from surgery in 2 weeks    2. SIRS (systemic inflammatory response syndrome)  - When admitted, patient met SIRS criteria w/ tachycardia and tachypnea; now improved  - Completed 7 day course of IV Zosyn (3/25 - 3/31)  - Seen by infectious disease specialist  - Bcx negative thus far     3. Gait instability  - Acute on chronic gait instability likely in the setting of anal pain radiating down from legs  - Neuro exam benign, 5/5 in UE b/l, 5/5 strength in RLE, and 4/5 strength in LLE which is patient's baseline  - Ambulating with cane  - Seen by PT; recommend home with home PT and rolling walker    4. HTN (hypertension)  - Continue metoprolol 50 mg  - Held HCTZ and losartan given tachycardia    5. Anemia  - Likely ACD from malignancy vs KAREN, however patient does have blood in toilet after bowel movements and blood on toilet paper  - Hgb 11.3 from 12.9 in 1/22, 13.8 in 10/21  - likely 2/2 to AOCD    6. HIV disease  - Patient compliant with biktarvy, will continue inpatient  - CD4 count 947 in 5/21, now 239  - Undetectable viral load   - Seen by jesse DODD continuing Biktarvy    7. Major depression  - continue olanzapine 10mg daily    8. Anxiety  - continue home xanax 1mg PRN    9. Insomnia   - continue home zolpidem 10mg PRN    10. HLD (hyperlipidemia)  - Continue atorvastatin 10 mg    Discussed with Dr. Caceres on 4/2/22. Pt stable for discharge. Medications reviewed.

## 2022-03-24 NOTE — DISCHARGE NOTE PROVIDER - NSDCCPCAREPLAN_GEN_ALL_CORE_FT
PRINCIPAL DISCHARGE DIAGNOSIS  Diagnosis: Rectal or anal pain  Assessment and Plan of Treatment: Your rectal/anal pain is likely secondary to your prostated and rectal cancer. It was found that you have an area of induration (hardness) and ulceration in your rectum area. As a result, you had a surgery done and had an ostomy bag placed so that your stool can come through there instead. Follow directions provided to you by wound care nurse for proper management of the ostomy bag. Make sure to note if the stool becomes bloody or liquidy and contact your provider immediately.   Continue taking you pain medication as needed to help.  Take sitz bath for extra relief as well.  Follow up with Dr. Avilez from surgery in 2 weeks.      SECONDARY DISCHARGE DIAGNOSES  Diagnosis: SIRS (systemic inflammatory response syndrome)  Assessment and Plan of Treatment: While you were at the hospital, you had a fever and your heart rate was high. You were on IV antibiotics during your stay and you completed the full course. Your blood cultueres came back negative which means the infection has cleared.  Continue following up with your outpatient provider for further evaluation.  If you develop fevers, chills, nausea, vomiting, dizziness, cough, or any other symptoms, contact your doctor immediately.    Diagnosis: Gait instability  Assessment and Plan of Treatment: Your difficulty in walking in likely secondary to the rectal pain you have that comes down your legs. This is all likely due to your cancer.  You were seen and evaluated by physical therapy while at the hospital. Continue to follow up with physical therapy outpatient.  Use your cane or rolling walker for assistance to walk.    Diagnosis: HTN (hypertension)  Assessment and Plan of Treatment: Continue taking your metoprolol as directed.  Your losartan and hydrochlorothiazide was held while in the hospital because your heart rate was high. Follow up with your primary care doctor in regards to continuing these medications.    Diagnosis: Anemia  Assessment and Plan of Treatment: Your hemoglobin (levels of blood in the body) was slightly lower than normal but not low to the point where you would need more blood. This is likely secondary to your cancer.  Follow up with your primary care doctor to ensure the values remain stable.   See your doctor immediately if you develop dizziness, weakness, or bleeding from any place.    Diagnosis: HIV disease  Assessment and Plan of Treatment: Continue taking your Biktarvy and follow up with your outpatient provider.    Diagnosis: Major depression  Assessment and Plan of Treatment: Continue taking Zolpidem as directed and follow up with your outpatient provider.    Diagnosis: Anxiety  Assessment and Plan of Treatment: Continue taking your Xanax as directed anf follow up with your outpatient provider.    Diagnosis: Insomnia  Assessment and Plan of Treatment: Continue with your Zolpidem. Follow up with your outpatient provider for further evalaution and treatment.    Diagnosis: HLD (hyperlipidemia)  Assessment and Plan of Treatment: Continue with your atorvastatin. Follow up with your outpatient provider for further evalaution and treatment.     PRINCIPAL DISCHARGE DIAGNOSIS  Diagnosis: Rectal or anal pain  Assessment and Plan of Treatment: Your rectal/anal pain is likely secondary to your prostate and rectal cancer. It was found that you have an area of induration (hardness) and ulceration in your rectum area. As a result, you had a surgery done and had an ostomy bag placed so that your stool can come through there instead. Follow directions provided to you by wound care nurse for proper management of the ostomy bag. Make sure to note if the stool becomes bloody or liquidy and contact your provider immediately.   Continue taking your pain medication as needed to help.  Take sitz bath for extra relief as well.  Follow up with Dr. Avilez from surgery in 2 weeks.      SECONDARY DISCHARGE DIAGNOSES  Diagnosis: SIRS (systemic inflammatory response syndrome)  Assessment and Plan of Treatment: While you were at the hospital, you had a fever and your heart rate was high. You were on IV antibiotics during your stay and you completed the full course. Your blood cultueres were negative meaning you did not have any bloodstream infection.   Continue following up with your outpatient provider for further evaluation.  If you develop fevers, chills, nausea, vomiting, dizziness, cough, or any other symptoms, contact your doctor immediately.    Diagnosis: Gait instability  Assessment and Plan of Treatment: Your difficulty in walking in likely secondary to the rectal pain you have that comes down your legs. This is all likely due to your cancer.  You were seen and evaluated by physical therapy while at the hospital. Continue to follow up with physical therapy outpatient.  Use your cane or rolling walker for assistance to walk.    Diagnosis: HTN (hypertension)  Assessment and Plan of Treatment: Continue taking your metoprolol as directed.  Your losartan and hydrochlorothiazide was held while in the hospital. Follow up with your primary care doctor in regards to restarting these medications.    Diagnosis: Anemia  Assessment and Plan of Treatment: Your hemoglobin (levels of blood in the body) was slightly lower than normal but not low to the point where you would need more blood. This is likely secondary to your cancer.  Follow up with your primary care doctor to ensure the values remain stable.   See your doctor immediately if you develop dizziness, weakness, or bleeding from any place.    Diagnosis: HIV disease  Assessment and Plan of Treatment: Continue taking your Biktarvy and follow up with your outpatient provider.    Diagnosis: Major depression  Assessment and Plan of Treatment: Continue taking Zolpidem as directed and follow up with your outpatient provider.    Diagnosis: Anxiety  Assessment and Plan of Treatment: Continue taking your Xanax as directed anf follow up with your outpatient provider.    Diagnosis: Insomnia  Assessment and Plan of Treatment: Continue with your Zolpidem. Follow up with your outpatient provider for further evalaution and treatment.    Diagnosis: HLD (hyperlipidemia)  Assessment and Plan of Treatment: Continue with your atorvastatin. Follow up with your outpatient provider for further evalaution and treatment.     PRINCIPAL DISCHARGE DIAGNOSIS  Diagnosis: Rectal or anal pain  Assessment and Plan of Treatment: Your rectal/anal pain is likely secondary to your prostate and rectal cancer. It was found that you have an area of induration (hardness) and ulceration in your rectum area. As a result, you had a surgery done and had an ostomy bag placed so that your stool can come through there instead. Follow directions provided to you by wound care nurse for proper management of the ostomy bag. Make sure to note if the stool becomes bloody or liquidy and contact your provider immediately.   Continue taking your pain medication as needed to help.  Take sitz bath for extra relief as well.  Follow up with Dr. Avilez from surgery in 2 weeks.      SECONDARY DISCHARGE DIAGNOSES  Diagnosis: HIV disease  Assessment and Plan of Treatment: Continue taking your Biktarvy and follow up with your outpatient provider.    Diagnosis: SIRS (systemic inflammatory response syndrome)  Assessment and Plan of Treatment: While you were at the hospital, you had a fever and your heart rate was high. You were on IV antibiotics during your stay and you completed the full course. Your blood cultueres were negative meaning you did not have any bloodstream infection.   Continue following up with your outpatient provider for further evaluation.  If you develop fevers, chills, nausea, vomiting, dizziness, cough, or any other symptoms, contact your doctor immediately.    Diagnosis: Major depression  Assessment and Plan of Treatment: Continue taking Zolpidem as directed and follow up with your outpatient provider.    Diagnosis: Anxiety  Assessment and Plan of Treatment: Continue taking your Xanax as directed anf follow up with your outpatient provider.    Diagnosis: Insomnia  Assessment and Plan of Treatment: Continue with your Zolpidem. Follow up with your outpatient provider for further evalaution and treatment.    Diagnosis: HTN (hypertension)  Assessment and Plan of Treatment: Continue taking your metoprolol as directed.  Your losartan and hydrochlorothiazide was held while in the hospital. Follow up with your primary care doctor in regards to restarting these medications.    Diagnosis: Gait instability  Assessment and Plan of Treatment: Your difficulty in walking in likely secondary to the rectal pain you have that comes down your legs. This is all likely due to your cancer.  You were seen and evaluated by physical therapy while at the hospital. Continue to follow up with physical therapy outpatient.  Use your cane or rolling walker for assistance to walk.    Diagnosis: Anemia  Assessment and Plan of Treatment: Your hemoglobin (levels of blood in the body) was slightly lower than normal but not low to the point where you would need more blood. This is likely secondary to your cancer.  Follow up with your primary care doctor to ensure the values remain stable.   See your doctor immediately if you develop dizziness, weakness, or bleeding from any place.    Diagnosis: HLD (hyperlipidemia)  Assessment and Plan of Treatment: Continue with your atorvastatin. Follow up with your outpatient provider for further evalaution and treatment.

## 2022-03-24 NOTE — DISCHARGE NOTE PROVIDER - CARE PROVIDERS DIRECT ADDRESSES
[email or web address]_Unknown,[email or web address]

## 2022-03-24 NOTE — DISCHARGE NOTE PROVIDER - DETAILS OF MALNUTRITION DIAGNOSIS/DIAGNOSES
This patient has been assessed with a concern for Malnutrition and was treated during this hospitalization for the following Nutrition diagnosis/diagnoses:     -  03/20/2022: Mild protein-calorie malnutrition

## 2022-03-24 NOTE — CHART NOTE - NSCHARTNOTEFT_GEN_A_CORE
Preop Dx: Transphincteric abscess  Surgeon: Dr. Avilez  Procedure: Exam under anesthesia, incision and drainage of abscess    Vital Signs Last 24 Hrs  T(C): 37.3 (24 Mar 2022 10:00), Max: 37.5 (23 Mar 2022 17:01)  T(F): 99.2 (24 Mar 2022 10:00), Max: 99.5 (23 Mar 2022 17:01)  HR: 100 (24 Mar 2022 10:00) (91 - 101)  BP: 126/76 (24 Mar 2022 10:00) (126/76 - 145/89)  BP(mean): --  RR: 18 (24 Mar 2022 10:00) (17 - 18)  SpO2: 100% (24 Mar 2022 10:00) (98% - 100%)                        11.1   7.55  )-----------( 409      ( 24 Mar 2022 07:45 )             33.0     03-24    134<L>  |  99  |  12  ----------------------------<  91  3.7   |  26  |  0.85    Ca    9.1      24 Mar 2022 07:45  Phos  3.9     03-24  Mg     2.30     03-24        Daily     Daily     Type and Screen: 3/22 (will need one drawn am 3/25)  Allergies: No Known Allergies      A/P: 56y Male     - OR 3/25/2022 for EUA with Dr. Avilez  - NPO past midnight, except medications  - IVF while NPO  - Consent to be signed  - Please draw coag panel, BMP with Mg, Ph, CBC, Type and Screen, and Have covid within 72 hours

## 2022-03-24 NOTE — PROGRESS NOTE ADULT - ASSESSMENT
56 m with HTN< HIV on Biktarvy, last VL:UD and CD4>900, prostate ca 2016 s/p radiation, anal cancer (dx 4/2021) s/p radiation and chemotherapy (last 8/2021) complicated by an ulcer,  p/w rectal bleeding and pain, also b/l inguinal pain  afebrile, tmax: 100 but tachy, WBC normal  CT: Limited evaluation for residual sinus tract. A small droplet of gas is noted in the left intersphincteric space with associated edema, similar in appearance to prior CT dated 1/18/2022. There is an appearance concerning for residual tract  blood and urine cx negative  CD4 now 239    HIV on Biktarvy, VL UD, CD4 now 239 (was >900 before)  anal Ca s/p chemo and RT with rectal ulcer, now with rectal pain and bleeding, CT with unchanged gas and edema in the L intersphincteric space, ?residual tract, MRI showed a 2.9 cm perianal abscess, no fistula  b/l inguinal pain, unclear etiology, LS spine MRI with disc bulges with L4-L5, mild to mod spinal stenosis      Overall HIV, perirectal abscess.     PLAN;   * S/P colorectal eval, plan for drainage, please send cx.   * c/w zosyn,  * c/w biktarvy      Deric Banerjee  Please contact through MS Teams   If no response or past 5 pm/weekend call [phone].

## 2022-03-24 NOTE — DISCHARGE NOTE PROVIDER - CARE PROVIDER_API CALL
Rick Avilez)  ColonRectal Surgery; Surgery  [address] [place] [address]  Phone: [phone]  Fax: [phone]  Established Patient  Follow Up Time: 2 weeks   Rick Avilez)  ColonRectal Surgery; Surgery  [address] [place] [address]  Phone: [phone]  Fax: [phone]  Established Patient  Follow Up Time: 2 weeks    Shirley Hopson  Phone: [phone]  Fax: (   )    -  Follow Up Time:    Rick Avilez)  ColonRectal Surgery; Surgery  [address] [place] [address]  Phone: [phone]  Fax: [phone]  Established Patient  Follow Up Time: 2 weeks    Shirley Hopson  Phone: [phone]  Fax: (   )    -  Follow Up Time:     Patricia Doty)  [facility]; Internal Medicine  [address] [place] [address]  Phone: [phone]  Fax: [phone]  Follow Up Time:

## 2022-03-24 NOTE — DISCHARGE NOTE PROVIDER - NSDCFUSCHEDAPPT_GEN_ALL_CORE_FT
CARL ROWLEY ; 03/24/2022 ; [facility] [address] ; 03/25/2022 ; [facility] [address] ; 03/28/2022 ; [facility] [address] ; 03/29/2022 ; [facility] [address] ; 03/30/2022 ; [facility] [address] ; 03/31/2022 ; [facility] [address] ; 04/01/2022 ; [facility] [address]

## 2022-03-24 NOTE — PROGRESS NOTE ADULT - PROBLEM SELECTOR PLAN 2
-when admitted, patient met SIRS criteria w/ tachycardia and tachypnea  -subjective fevers and chills at home, however afebrile in ED and per patient all measured temps at home <100  -no infectious symptoms  -received 1 dose vanc/zosyn in ED    Plan: given HIV and possible fistula would continue empiric antibiotics pending culture results with zosyn.  ID consulted

## 2022-03-24 NOTE — PROGRESS NOTE ADULT - SUBJECTIVE AND OBJECTIVE BOX
Patient is a 56y old  Male who presents with a chief complaint of Rectal pain and bleeding, difficulty ambulating (24 Mar 2022 12:20)      SUBJECTIVE / OVERNIGHT EVENTS: No acute events overnight. Pt has no new complaints     ADDITIONAL REVIEW OF SYSTEMS:    MEDICATIONS  (STANDING):  atorvastatin 10 milliGRAM(s) Oral at bedtime  bictegravir 50 mG/emtricitabine 200 mG/tenofovir alafenamide 25 mG (BIKTARVY) 1 Tablet(s) Oral daily  chlorhexidine 2% Cloths 1 Application(s) Topical daily  gabapentin 300 milliGRAM(s) Oral <User Schedule>  metoprolol succinate ER 50 milliGRAM(s) Oral daily  morphine ER Tablet 90 milliGRAM(s) Oral every 8 hours  OLANZapine 10 milliGRAM(s) Oral at bedtime  piperacillin/tazobactam IVPB.. 3.375 Gram(s) IV Intermittent every 8 hours  polyethylene glycol 3350 17 Gram(s) Oral two times a day  psyllium Powder 1 Packet(s) Oral two times a day  senna 2 Tablet(s) Oral two times a day    MEDICATIONS  (PRN):  acetaminophen     Tablet .. 650 milliGRAM(s) Oral every 6 hours PRN Temp greater or equal to 38C (100.4F), Mild Pain (1 - 3)  ALPRAZolam 1 milliGRAM(s) Oral daily PRN anxiety  HYDROmorphone  Injectable 1 milliGRAM(s) IV Push every 3 hours PRN Moderate Pain (4 - 6)  HYDROmorphone  Injectable 2 milliGRAM(s) IV Push every 3 hours PRN Severe Pain (7 - 10)  zolpidem 5 milliGRAM(s) Oral at bedtime PRN Insomnia      CAPILLARY BLOOD GLUCOSE        I&O's Summary      PHYSICAL EXAM:  Vital Signs Last 24 Hrs  T(C): 37.3 (24 Mar 2022 10:00), Max: 37.5 (23 Mar 2022 17:01)  T(F): 99.2 (24 Mar 2022 10:00), Max: 99.5 (23 Mar 2022 17:01)  HR: 100 (24 Mar 2022 10:00) (91 - 101)  BP: 126/76 (24 Mar 2022 10:00) (126/76 - 145/89)  BP(mean): --  RR: 18 (24 Mar 2022 10:00) (17 - 18)  SpO2: 100% (24 Mar 2022 10:00) (98% - 100%)  CONSTITUTIONAL: NAD, well-developed, well-groomed  EYES: PERRLA; conjunctiva and sclera clear  ENMT: Moist oral mucosa, no pharyngeal injection or exudates; normal dentition  NECK: Supple, no palpable masses; no thyromegaly  RESPIRATORY: Normal respiratory effort; lungs are clear to auscultation bilaterally  CARDIOVASCULAR: Regular rate and rhythm, normal S1 and S2, no murmur/rub/gallop; No lower extremity edema; Peripheral pulses are 2+ bilaterally  ABDOMEN: Nontender to palpation, normoactive bowel sounds, no rebound/guarding; No hepatosplenomegaly  MUSCULOSKELETAL:  Normal gait; no clubbing or cyanosis of digits; no joint swelling or tenderness to palpation  PSYCH: A+O to person, place, and time; affect appropriate  NEUROLOGY: CN 2-12 are intact and symmetric; no gross sensory deficits   SKIN: No rashes; no palpable lesions    LABS:                        11.1   7.55  )-----------( 409      ( 24 Mar 2022 07:45 )             33.0     03-24    134<L>  |  99  |  12  ----------------------------<  91  3.7   |  26  |  0.85    Ca    9.1      24 Mar 2022 07:45  Phos  3.9     03-24  Mg     2.30     03-24                  RADIOLOGY & ADDITIONAL TESTS:  Results Reviewed:   Imaging Personally Reviewed:  Electrocardiogram Personally Reviewed:    COORDINATION OF CARE:  Care Discussed with Consultants/Other Providers [Y/N]:  Prior or Outpatient Records Reviewed [Y/N]:

## 2022-03-24 NOTE — DISCHARGE NOTE PROVIDER - PROVIDER TOKENS
PROVIDER:[TOKEN:[[id number]],FOLLOWUP:[2 weeks],ESTABLISHEDPATIENT:[T]] PROVIDER:[TOKEN:[[id number]],FOLLOWUP:[2 weeks],ESTABLISHEDPATIENT:[T]],FREE:[LAST:[Mark Anthony],FIRST:[Shirley],PHONE:[phone]],FAX:[(   )    -]] PROVIDER:[TOKEN:[[id number]],FOLLOWUP:[2 weeks],ESTABLISHEDPATIENT:[T]],FREE:[LAST:[Mark Anthony],FIRST:[Ogryan],PHONE:[phone]],FAX:[(   )    -]],PROVIDER:[TOKEN:[[id number]]]

## 2022-03-24 NOTE — PROGRESS NOTE ADULT - SUBJECTIVE AND OBJECTIVE BOX
A Team Progress Note  [id number]    Subjective:   Patient seen at bedside this AM. Pain is stable, last bm day before yesterday. No n/v, tolerating diet, no f/n/c, chest pain or shortness of breath.    Objective:  Vital Signs  T(C): 37.3 (03-24 @ 10:00), Max: 37.5 (03-23 @ 17:01)  HR: 100 (03-24 @ 10:00) (91 - 101)  BP: 126/76 (03-24 @ 10:00) (126/76 - 145/89)  RR: 18 (03-24 @ 10:00) (17 - 18)  SpO2: 100% (03-24 @ 10:00) (98% - 100%)    I&O's Detail      Physical Exam:  GEN: resting in bed comfortably in NAD  RESP: no increased WOB  ABD: non-distended  EXTR: warm, well-perfused    Labs:                        11.1   7.55  )-----------( 409      ( 24 Mar 2022 07:45 )             33.0   03-24    134<L>  |  99  |  12  ----------------------------<  91  3.7   |  26  |  0.85    Ca    9.1      24 Mar 2022 07:45  Phos  3.9     03-24  Mg     2.30     03-24      CAPILLARY BLOOD GLUCOSE          Medications:   MEDICATIONS  (STANDING):  atorvastatin 10 milliGRAM(s) Oral at bedtime  bictegravir 50 mG/emtricitabine 200 mG/tenofovir alafenamide 25 mG (BIKTARVY) 1 Tablet(s) Oral daily  chlorhexidine 2% Cloths 1 Application(s) Topical daily  gabapentin 300 milliGRAM(s) Oral <User Schedule>  metoprolol succinate ER 50 milliGRAM(s) Oral daily  morphine ER Tablet 90 milliGRAM(s) Oral every 8 hours  OLANZapine 10 milliGRAM(s) Oral at bedtime  piperacillin/tazobactam IVPB.. 3.375 Gram(s) IV Intermittent every 8 hours  polyethylene glycol 3350 17 Gram(s) Oral two times a day  psyllium Powder 1 Packet(s) Oral two times a day  senna 2 Tablet(s) Oral two times a day    MEDICATIONS  (PRN):  acetaminophen     Tablet .. 650 milliGRAM(s) Oral every 6 hours PRN Temp greater or equal to 38C (100.4F), Mild Pain (1 - 3)  ALPRAZolam 1 milliGRAM(s) Oral daily PRN anxiety  HYDROmorphone  Injectable 1 milliGRAM(s) IV Push every 3 hours PRN Moderate Pain (4 - 6)  HYDROmorphone  Injectable 2 milliGRAM(s) IV Push every 3 hours PRN Severe Pain (7 - 10)  zolpidem 5 milliGRAM(s) Oral at bedtime PRN Insomnia      Imaging:

## 2022-03-24 NOTE — PROGRESS NOTE ADULT - SUBJECTIVE AND OBJECTIVE BOX
56y old  Male who presents with a chief complaint of Rectal pain and bleeding, difficulty ambulating (24 Mar 2022 13:27)      Interval history:  Afebrile, pain in rectal area, constipated because of pain.       Allergies:   No Known Allergies      Antimicrobials:  bictegravir 50 mG/emtricitabine 200 mG/tenofovir alafenamide 25 mG (BIKTARVY) 1 Tablet(s) Oral daily  piperacillin/tazobactam IVPB.. 3.375 Gram(s) IV Intermittent every 8 hours      REVIEW OF SYSTEMS:  No chest pain   No SOB  No N/V  No rash.       Vital Signs Last 24 Hrs  T(C): 37.3 (03-24-22 @ 10:00), Max: 37.3 (03-24-22 @ 05:47)  T(F): 99.2 (03-24-22 @ 10:00), Max: 99.2 (03-24-22 @ 05:47)  HR: 100 (03-24-22 @ 10:00) (92 - 101)  BP: 126/76 (03-24-22 @ 10:00) (126/76 - 143/60)  BP(mean): --  RR: 18 (03-24-22 @ 10:00) (17 - 18)  SpO2: 100% (03-24-22 @ 10:00) (98% - 100%)      PHYSICAL EXAM:  Patient in no acute distress. Alert, awake.  Cardiovascular: S1S2 normal.  Lungs: + air entry B/L lung fields.  Gastrointestinal: soft, nontender, nondistended.  Extremities: no edema.  IV sites not inflamed.                             11.1   7.55  )-----------( 409      ( 24 Mar 2022 07:45 )             33.0   03-24    134<L>  |  99  |  12  ----------------------------<  91  3.7   |  26  |  0.85    Ca    9.1      24 Mar 2022 07:45  Phos  3.9     03-24  Mg     2.30     03-24

## 2022-03-25 ENCOUNTER — APPOINTMENT (OUTPATIENT)
Dept: HYPERBARIC MEDICINE | Facility: CLINIC | Age: 57
End: 2022-03-25

## 2022-03-25 ENCOUNTER — RESULT REVIEW (OUTPATIENT)
Age: 57
End: 2022-03-25

## 2022-03-25 DIAGNOSIS — K62.6 ULCER OF ANUS AND RECTUM: ICD-10-CM

## 2022-03-25 LAB
ALBUMIN SERPL ELPH-MCNC: 3 G/DL — LOW (ref 3.3–5)
ALP SERPL-CCNC: 72 U/L — SIGNIFICANT CHANGE UP (ref 40–120)
ALT FLD-CCNC: 11 U/L — SIGNIFICANT CHANGE UP (ref 4–41)
ANION GAP SERPL CALC-SCNC: 9 MMOL/L — SIGNIFICANT CHANGE UP (ref 7–14)
AST SERPL-CCNC: 12 U/L — SIGNIFICANT CHANGE UP (ref 4–40)
BASOPHILS # BLD AUTO: 0.03 K/UL — SIGNIFICANT CHANGE UP (ref 0–0.2)
BASOPHILS NFR BLD AUTO: 0.4 % — SIGNIFICANT CHANGE UP (ref 0–2)
BILIRUB SERPL-MCNC: 0.3 MG/DL — SIGNIFICANT CHANGE UP (ref 0.2–1.2)
BLD GP AB SCN SERPL QL: NEGATIVE — SIGNIFICANT CHANGE UP
BUN SERPL-MCNC: 9 MG/DL — SIGNIFICANT CHANGE UP (ref 7–23)
CALCIUM SERPL-MCNC: 9.2 MG/DL — SIGNIFICANT CHANGE UP (ref 8.4–10.5)
CHLORIDE SERPL-SCNC: 105 MMOL/L — SIGNIFICANT CHANGE UP (ref 98–107)
CO2 SERPL-SCNC: 25 MMOL/L — SIGNIFICANT CHANGE UP (ref 22–31)
CREAT SERPL-MCNC: 0.81 MG/DL — SIGNIFICANT CHANGE UP (ref 0.5–1.3)
EGFR: 104 ML/MIN/1.73M2 — SIGNIFICANT CHANGE UP
EOSINOPHIL # BLD AUTO: 0.04 K/UL — SIGNIFICANT CHANGE UP (ref 0–0.5)
EOSINOPHIL NFR BLD AUTO: 0.5 % — SIGNIFICANT CHANGE UP (ref 0–6)
GLUCOSE SERPL-MCNC: 102 MG/DL — HIGH (ref 70–99)
HCT VFR BLD CALC: 34.5 % — LOW (ref 39–50)
HGB BLD-MCNC: 11 G/DL — LOW (ref 13–17)
IANC: 4.96 K/UL — SIGNIFICANT CHANGE UP (ref 1.8–7.4)
IMM GRANULOCYTES NFR BLD AUTO: 0.7 % — SIGNIFICANT CHANGE UP (ref 0–1.5)
INR BLD: 1.3 RATIO — HIGH (ref 0.88–1.16)
LYMPHOCYTES # BLD AUTO: 2.24 K/UL — SIGNIFICANT CHANGE UP (ref 1–3.3)
LYMPHOCYTES # BLD AUTO: 27 % — SIGNIFICANT CHANGE UP (ref 13–44)
MAGNESIUM SERPL-MCNC: 2.2 MG/DL — SIGNIFICANT CHANGE UP (ref 1.6–2.6)
MCHC RBC-ENTMCNC: 28.1 PG — SIGNIFICANT CHANGE UP (ref 27–34)
MCHC RBC-ENTMCNC: 31.9 GM/DL — LOW (ref 32–36)
MCV RBC AUTO: 88.2 FL — SIGNIFICANT CHANGE UP (ref 80–100)
MONOCYTES # BLD AUTO: 0.96 K/UL — HIGH (ref 0–0.9)
MONOCYTES NFR BLD AUTO: 11.6 % — SIGNIFICANT CHANGE UP (ref 2–14)
NEUTROPHILS # BLD AUTO: 4.96 K/UL — SIGNIFICANT CHANGE UP (ref 1.8–7.4)
NEUTROPHILS NFR BLD AUTO: 59.8 % — SIGNIFICANT CHANGE UP (ref 43–77)
NRBC # BLD: 0 /100 WBCS — SIGNIFICANT CHANGE UP
NRBC # FLD: 0 K/UL — SIGNIFICANT CHANGE UP
PHOSPHATE SERPL-MCNC: 3.9 MG/DL — SIGNIFICANT CHANGE UP (ref 2.5–4.5)
PLATELET # BLD AUTO: 374 K/UL — SIGNIFICANT CHANGE UP (ref 150–400)
POTASSIUM SERPL-MCNC: 4 MMOL/L — SIGNIFICANT CHANGE UP (ref 3.5–5.3)
POTASSIUM SERPL-SCNC: 4 MMOL/L — SIGNIFICANT CHANGE UP (ref 3.5–5.3)
PROT SERPL-MCNC: 6.4 G/DL — SIGNIFICANT CHANGE UP (ref 6–8.3)
PROTHROM AB SERPL-ACNC: 15.1 SEC — HIGH (ref 10.5–13.4)
RBC # BLD: 3.91 M/UL — LOW (ref 4.2–5.8)
RBC # FLD: 13.6 % — SIGNIFICANT CHANGE UP (ref 10.3–14.5)
RH IG SCN BLD-IMP: NEGATIVE — SIGNIFICANT CHANGE UP
SODIUM SERPL-SCNC: 139 MMOL/L — SIGNIFICANT CHANGE UP (ref 135–145)
WBC # BLD: 8.29 K/UL — SIGNIFICANT CHANGE UP (ref 3.8–10.5)
WBC # FLD AUTO: 8.29 K/UL — SIGNIFICANT CHANGE UP (ref 3.8–10.5)

## 2022-03-25 PROCEDURE — 99233 SBSQ HOSP IP/OBS HIGH 50: CPT

## 2022-03-25 PROCEDURE — 88305 TISSUE EXAM BY PATHOLOGIST: CPT | Mod: 26

## 2022-03-25 PROCEDURE — 88342 IMHCHEM/IMCYTCHM 1ST ANTB: CPT | Mod: 26

## 2022-03-25 PROCEDURE — 88312 SPECIAL STAINS GROUP 1: CPT | Mod: 26

## 2022-03-25 PROCEDURE — 45300 PROCTOSIGMOIDOSCOPY DX: CPT | Mod: GC

## 2022-03-25 RX ORDER — FENTANYL CITRATE 50 UG/ML
25 INJECTION INTRAVENOUS
Refills: 0 | Status: DISCONTINUED | OUTPATIENT
Start: 2022-03-25 | End: 2022-03-25

## 2022-03-25 RX ORDER — LANOLIN ALCOHOL/MO/W.PET/CERES
3 CREAM (GRAM) TOPICAL AT BEDTIME
Refills: 0 | Status: DISCONTINUED | OUTPATIENT
Start: 2022-03-25 | End: 2022-03-30

## 2022-03-25 RX ORDER — ONDANSETRON 8 MG/1
4 TABLET, FILM COATED ORAL ONCE
Refills: 0 | Status: DISCONTINUED | OUTPATIENT
Start: 2022-03-25 | End: 2022-03-25

## 2022-03-25 RX ORDER — FENTANYL CITRATE 50 UG/ML
50 INJECTION INTRAVENOUS
Refills: 0 | Status: DISCONTINUED | OUTPATIENT
Start: 2022-03-25 | End: 2022-03-25

## 2022-03-25 RX ORDER — HYDROMORPHONE HYDROCHLORIDE 2 MG/ML
3 INJECTION INTRAMUSCULAR; INTRAVENOUS; SUBCUTANEOUS
Refills: 0 | Status: DISCONTINUED | OUTPATIENT
Start: 2022-03-25 | End: 2022-03-30

## 2022-03-25 RX ORDER — HYDROMORPHONE HYDROCHLORIDE 2 MG/ML
0.5 INJECTION INTRAMUSCULAR; INTRAVENOUS; SUBCUTANEOUS
Refills: 0 | Status: DISCONTINUED | OUTPATIENT
Start: 2022-03-25 | End: 2022-03-25

## 2022-03-25 RX ADMIN — SENNA PLUS 2 TABLET(S): 8.6 TABLET ORAL at 19:38

## 2022-03-25 RX ADMIN — GABAPENTIN 300 MILLIGRAM(S): 400 CAPSULE ORAL at 11:36

## 2022-03-25 RX ADMIN — PIPERACILLIN AND TAZOBACTAM 25 GRAM(S): 4; .5 INJECTION, POWDER, LYOPHILIZED, FOR SOLUTION INTRAVENOUS at 01:24

## 2022-03-25 RX ADMIN — MORPHINE SULFATE 90 MILLIGRAM(S): 50 CAPSULE, EXTENDED RELEASE ORAL at 21:58

## 2022-03-25 RX ADMIN — HYDROMORPHONE HYDROCHLORIDE 3 MILLIGRAM(S): 2 INJECTION INTRAMUSCULAR; INTRAVENOUS; SUBCUTANEOUS at 12:01

## 2022-03-25 RX ADMIN — SODIUM CHLORIDE 75 MILLILITER(S): 9 INJECTION INTRAMUSCULAR; INTRAVENOUS; SUBCUTANEOUS at 00:00

## 2022-03-25 RX ADMIN — OLANZAPINE 10 MILLIGRAM(S): 15 TABLET, FILM COATED ORAL at 21:53

## 2022-03-25 RX ADMIN — CHLORHEXIDINE GLUCONATE 1 APPLICATION(S): 213 SOLUTION TOPICAL at 11:37

## 2022-03-25 RX ADMIN — ZOLPIDEM TARTRATE 5 MILLIGRAM(S): 10 TABLET ORAL at 21:58

## 2022-03-25 RX ADMIN — HYDROMORPHONE HYDROCHLORIDE 3 MILLIGRAM(S): 2 INJECTION INTRAMUSCULAR; INTRAVENOUS; SUBCUTANEOUS at 20:04

## 2022-03-25 RX ADMIN — Medication 50 MILLIGRAM(S): at 05:57

## 2022-03-25 RX ADMIN — ATORVASTATIN CALCIUM 10 MILLIGRAM(S): 80 TABLET, FILM COATED ORAL at 21:53

## 2022-03-25 RX ADMIN — HYDROMORPHONE HYDROCHLORIDE 2 MILLIGRAM(S): 2 INJECTION INTRAMUSCULAR; INTRAVENOUS; SUBCUTANEOUS at 00:05

## 2022-03-25 RX ADMIN — MORPHINE SULFATE 90 MILLIGRAM(S): 50 CAPSULE, EXTENDED RELEASE ORAL at 14:59

## 2022-03-25 RX ADMIN — HYDROMORPHONE HYDROCHLORIDE 3 MILLIGRAM(S): 2 INJECTION INTRAMUSCULAR; INTRAVENOUS; SUBCUTANEOUS at 16:08

## 2022-03-25 RX ADMIN — HYDROMORPHONE HYDROCHLORIDE 3 MILLIGRAM(S): 2 INJECTION INTRAMUSCULAR; INTRAVENOUS; SUBCUTANEOUS at 19:20

## 2022-03-25 RX ADMIN — GABAPENTIN 300 MILLIGRAM(S): 400 CAPSULE ORAL at 21:05

## 2022-03-25 RX ADMIN — Medication 1 PACKET(S): at 19:37

## 2022-03-25 RX ADMIN — HYDROMORPHONE HYDROCHLORIDE 3 MILLIGRAM(S): 2 INJECTION INTRAMUSCULAR; INTRAVENOUS; SUBCUTANEOUS at 22:49

## 2022-03-25 RX ADMIN — HYDROMORPHONE HYDROCHLORIDE 2 MILLIGRAM(S): 2 INJECTION INTRAMUSCULAR; INTRAVENOUS; SUBCUTANEOUS at 05:07

## 2022-03-25 RX ADMIN — HYDROMORPHONE HYDROCHLORIDE 3 MILLIGRAM(S): 2 INJECTION INTRAMUSCULAR; INTRAVENOUS; SUBCUTANEOUS at 23:49

## 2022-03-25 RX ADMIN — HYDROMORPHONE HYDROCHLORIDE 2 MILLIGRAM(S): 2 INJECTION INTRAMUSCULAR; INTRAVENOUS; SUBCUTANEOUS at 01:05

## 2022-03-25 RX ADMIN — MORPHINE SULFATE 90 MILLIGRAM(S): 50 CAPSULE, EXTENDED RELEASE ORAL at 05:58

## 2022-03-25 RX ADMIN — BICTEGRAVIR SODIUM, EMTRICITABINE, AND TENOFOVIR ALAFENAMIDE FUMARATE 1 TABLET(S): 30; 120; 15 TABLET ORAL at 21:53

## 2022-03-25 RX ADMIN — HYDROMORPHONE HYDROCHLORIDE 2 MILLIGRAM(S): 2 INJECTION INTRAMUSCULAR; INTRAVENOUS; SUBCUTANEOUS at 06:07

## 2022-03-25 NOTE — PROGRESS NOTE ADULT - SUBJECTIVE AND OBJECTIVE BOX
[facility] and Palliative Care  Terri Roberts, Palliative Care Attending  Contact Info: Page [phone] (including Nights/Weekends), message on Microsoft Teams (Terri Roberts), or leave  at Palliative Office [phone] (non-urgent)     SUBJECTIVE AND OBJECTIVE: Patient seen this AM after going to OR. He reports severe rectal pain and needs to have a BM but is afraid that he will push gauze out.     INTERVAL HPI/OVERNIGHT EVENTS:  > 3/22: Over the past 24 hours, patient required PRNs of IV dilaudid 1.5mg x5, IV dilaudid 1mg x1, and ambien 5mg x1.   > 3/23: Over the past 24 hours, patient required PRNs of IV dilaudid 2mg x6.   > 3/25: s/p possible perianal abscess. Over the past 24 hours, patient required PRNs of IV dilaudid 2mg x5, ambien x1, xanax x1.     DNR on chart:   Allergies    No Known Allergies    Intolerances    MEDICATIONS  (STANDING):  atorvastatin 10 milliGRAM(s) Oral at bedtime  bictegravir 50 mG/emtricitabine 200 mG/tenofovir alafenamide 25 mG (BIKTARVY) 1 Tablet(s) Oral daily  chlorhexidine 2% Cloths 1 Application(s) Topical daily  gabapentin 300 milliGRAM(s) Oral <User Schedule>  metoprolol succinate ER 50 milliGRAM(s) Oral daily  morphine ER Tablet 90 milliGRAM(s) Oral every 8 hours  OLANZapine 10 milliGRAM(s) Oral at bedtime  polyethylene glycol 3350 17 Gram(s) Oral two times a day  psyllium Powder 1 Packet(s) Oral two times a day  senna 2 Tablet(s) Oral two times a day  sodium chloride 0.9%. 1000 milliLiter(s) (75 mL/Hr) IV Continuous <Continuous>    MEDICATIONS  (PRN):  acetaminophen     Tablet .. 650 milliGRAM(s) Oral every 6 hours PRN Temp greater or equal to 38C (100.4F), Mild Pain (1 - 3)  ALPRAZolam 1 milliGRAM(s) Oral daily PRN anxiety  fentaNYL    Injectable 25 MICROGram(s) IV Push every 5 minutes PRN Mild Pain (1 - 3)  fentaNYL    Injectable 50 MICROGram(s) IV Push every 5 minutes PRN Severe Pain (7 - 10)  HYDROmorphone  Injectable 0.5 milliGRAM(s) IV Push every 10 minutes PRN Moderate Pain (4 - 6)  HYDROmorphone  Injectable 3 milliGRAM(s) IV Push every 3 hours PRN Severe Pain (7 - 10)  HYDROmorphone  Injectable 1 milliGRAM(s) IV Push every 3 hours PRN Moderate Pain (4 - 6)  ondansetron Injectable 4 milliGRAM(s) IV Push once PRN Nausea and/or Vomiting  zolpidem 5 milliGRAM(s) Oral at bedtime PRN Insomnia      ITEMS UNCHECKED ARE NOT PRESENT    PRESENT SYMPTOMS: [ ]Unable to obtain due to poor mentation   Source if other than patient:  [ ]Family   [ ]Team     Pain: [x ]yes [ ]no  QOL impact - difficulty ambulating   Location -    rectal pain, b/l leg pain                Aggravating factors - pain is constant, no pattern identified   Quality - sharp and stabbing   Radiation - rectal pain radiates to anterior groin   Timing- constant   Severity (0-10 scale): 9  Minimal acceptable level (0-10 scale): 2    Dyspnea:                           [ ]Mild [ ]Moderate [ ]Severe  Anxiety:                             [ ]Mild [ ]Moderate [ ]Severe  Fatigue:                             [ ]Mild [ ]Moderate [ ]Severe  Nausea:                             [ ]Mild [ ]Moderate [ ]Severe  Loss of appetite:              [ ]Mild [ ]Moderate [ ]Severe  Constipation:                    [ ]Mild [ ]Moderate [ ]Severe    PAIN AD Score:	  http://geriatrictoolkit.[email or web address]/painad.pdf (Ctrl + left click to view)    Other Symptoms:  [x ]All other review of systems negative     Palliative Performance Status Version 2:     70    %      http://npcrc.org/files/news/palliative_performance_scale_ppsv2.pdf  PHYSICAL EXAM:  Vital Signs Last 24 Hrs  T(C): 36.3 (25 Mar 2022 10:55), Max: 37.4 (24 Mar 2022 21:22)  T(F): 97.3 (25 Mar 2022 10:55), Max: 99.4 (24 Mar 2022 21:22)  HR: 106 (25 Mar 2022 10:55) (93 - 112)  BP: 151/94 (25 Mar 2022 10:55) (118/80 - 167/106)  BP(mean): 111 (25 Mar 2022 10:00) (108 - 126)  RR: 18 (25 Mar 2022 10:55) (13 - 22)  SpO2: 97% (25 Mar 2022 10:55) (95% - 100%) I&O's Summary     GENERAL:  [x ]Alert  [x ]Oriented x4   [ ]Lethargic  [ ]Cachexia  [ ]Unarousable  [x ]Verbal  [ ]Non-Verbal  Behavioral:   [ ] Anxiety  [ ] Delirium [ ] Agitation [x ] Other  HEENT:  [ x]Normal   [ ]Dry mouth   [ ]ET Tube/Trach  [ ]Oral lesions  PULMONARY:   [x ]Clear [ ]Tachypnea  [ ]Audible excessive secretions   [ ]Rhonchi        [ ]Right [ ]Left [ ]Bilateral  [ ]Crackles        [ ]Right [ ]Left [ ]Bilateral  [ ]Wheezing     [ ]Right [ ]Left [ ]Bilateral  [ ]Diminished breath sounds [ ]right [ ]left [ ]bilateral  CARDIOVASCULAR:    [ ]Regular [ ]Irregular [x ]Tachy  [ ]Vladimir [ ]Murmur [ ]Other  GASTROINTESTINAL:  [ x]Soft  [ ]Distended   [ ]+BS  [ ]Non tender [ ]Tender  [ ]PEG [ ]OGT/ NGT  Last BM: 3/22  GENITOURINARY: please see flowsheets   [ ]Normal [ ] Incontinent   [ ]Oliguria/Anuria   [ ]Sahu  MUSCULOSKELETAL:   [ x]Normal   [ ]Weakness  [ ]Bed/Wheelchair bound [ ]Edema  NEUROLOGIC:   [x ]No focal deficits  [ ]Cognitive impairment  [ ]Dysphagia [ ]Dysarthria [ ]Paresis [ ]Other   SKIN: please see flowsheets   [ ]Normal    [ ]Rash  [ ]Pressure ulcer(s)       Present on admission [ ]y [ ]n    CRITICAL CARE:  [ ] Shock Present  [ ]Septic [ ]Cardiogenic [ ]Neurologic [ ]Hypovolemic  [ ]  Vasopressors [ ]  Inotropes   [ ]Respiratory failure present [ ]Mechanical ventilation [ ]Non-invasive ventilatory support [ ]High flow  [ ]Acute  [ ]Chronic [ ]Hypoxic  [ ]Hypercarbic [ ]Other  [ ]Other organ failure     LABS:                        11.0   8.29  )-----------( 374      ( 25 Mar 2022 05:12 )             34.5   03-25    139  |  105  |  9   ----------------------------<  102<H>  4.0   |  25  |  0.81    Ca    9.2      25 Mar 2022 05:12  Phos  3.9     03-25  Mg     2.20     03-25    TPro  6.4  /  Alb  3.0<L>  /  TBili  0.3  /  DBili  x   /  AST  12  /  ALT  11  /  AlkPhos  72  03-25  PT/INR - ( 25 Mar 2022 05:12 )   PT: 15.1 sec;   INR: 1.30 ratio         RADIOLOGY & ADDITIONAL STUDIES: n/a     Protein Calorie Malnutrition Present: [ ]mild [ ]moderate [ ]severe [ ]underweight [ ]morbid obesity  https://www.andeal.org/vault/2440/web/files/ONC/Table_Clinical%20Characteristics%20to%20Document%20Malnutrition-White%20JV%20et%20al%202012.pdf    Height (cm): 180.3 (03-25-22 @ 07:42), 180.3 (11-26-21 @ 06:50), 180.3 (11-24-21 @ 10:02)  Weight (kg): 95.3 (03-25-22 @ 07:42), 101.6 (11-26-21 @ 06:50), 101.6 (11-24-21 @ 10:02)  BMI (kg/m2): 29.3 (03-25-22 @ 07:42), 31.3 (11-26-21 @ 06:50), 31.3 (11-24-21 @ 10:02)    [ ]PPSV2 < or = 30%  [ ]significant weight loss [ ]poor nutritional intake [ ]anasarca    [ ]Artificial Nutrition    REFERRALS:   [ ]Chaplaincy  [ ]Hospice  [ ]Child Life  [ x]Social Work  [ ]Case management [ ]Holistic Therapy

## 2022-03-25 NOTE — BRIEF OPERATIVE NOTE - OPERATION/FINDINGS
Exam under anesthesia with rectal biopsy and rigid sigmoidoscopy  Severe induration seen circumferentially with ulcerated mucosa and recessed cavity on left side inferior to true lumen. No definite abscess seen therefore no drainage or sphincterotomy was performed.

## 2022-03-25 NOTE — PROGRESS NOTE ADULT - NSPROGADDITIONALINFOA_GEN_ALL_CORE
3/25/22 @1:30 pm- I spoke to patient's wife, Trinity. I informed her of findings in OR today. I also told her that pt likely will need to go for second procedure, ? diverting colostomy. Still waiting for follow up recs from Surgery and definitive plan.

## 2022-03-25 NOTE — CHART NOTE - NSCHARTNOTEFT_GEN_A_CORE
POST-OPERATIVE NOTE    Subjective:  Patient is s/p EUA with sigmoidoscopy. Patient denies acute onset pain, fevers, NVD, chills, lightheadedness, SOB, CP. Recovering appropriately.     Vital Signs Last 24 Hrs  T(C): 36.3 (25 Mar 2022 10:55), Max: 37.4 (24 Mar 2022 21:22)  T(F): 97.3 (25 Mar 2022 10:55), Max: 99.4 (24 Mar 2022 21:22)  HR: 106 (25 Mar 2022 10:55) (93 - 112)  BP: 151/94 (25 Mar 2022 10:55) (118/80 - 167/106)  BP(mean): 111 (25 Mar 2022 10:00) (108 - 126)  RR: 18 (25 Mar 2022 10:55) (13 - 22)  SpO2: 97% (25 Mar 2022 10:55) (95% - 100%)  I&O's Detail    bictegravir 50 mG/emtricitabine 200 mG/tenofovir alafenamide 25 mG (BIKTARVY) 1  bictegravir 50 mG/emtricitabine 200 mG/tenofovir alafenamide 25 mG (BIKTARVY) 1  metoprolol succinate ER 50    PAST MEDICAL & SURGICAL HISTORY:  HTN (hypertension)    HLD (hyperlipidemia)    HIV infection    Depressive disorder    Anxiety    Prostate cancer    Anal cancer    Diverticulosis    Anal lesion          Physical Exam:  General: NAD, resting comfortably in bed  Pulmonary: Nonlabored breathing, no respiratory distress  Cardiovascular: NSR  Abdominal: soft, NT/ND  Extremities: WWP      LABS:                        11.0   8.29  )-----------( 374      ( 25 Mar 2022 05:12 )             34.5     03-25    139  |  105  |  9   ----------------------------<  102<H>  4.0   |  25  |  0.81    Ca    9.2      25 Mar 2022 05:12  Phos  3.9     03-25  Mg     2.20     03-25    TPro  6.4  /  Alb  3.0<L>  /  TBili  0.3  /  DBili  x   /  AST  12  /  ALT  11  /  AlkPhos  72  03-25    PT/INR - ( 25 Mar 2022 05:12 )   PT: 15.1 sec;   INR: 1.30 ratio           CAPILLARY BLOOD GLUCOSE          Radiology and Additional Studies:    Assessment:  The patient is a 56y Male who is now several hours post-op from an EUA. no sphincterotomy was performed and no abscess was appreciated or drained.     Plan:  - Pain control as needed  - DVT ppx  - OOB and ambulating as tolerated  - F/u AM labs      A Team   Pager [phone]

## 2022-03-25 NOTE — PROGRESS NOTE ADULT - PROBLEM SELECTOR PLAN 1
Patient follows with outpatient palliative team: Dr. Colon and Neena Borrego at [facility].   > At home, patient on MS Contin 60mg q8hrs and PO dilaudid 4-8mg q4h prn. He was receiving hyperbaric oxygen treatments outpatient.   > Continue MS Contin 60mg tid to 90mg TID   > On IV dilaudid 1mg q3 hr prn moderate pain   > Increase severe PRNs to 3mg IV dilaudid q3 hr on 3/25. Has utilized PRNs x5  > added sitz baths   > Continue gabapentin to 300mg tid (9am, 1pm, 9pm) on 3/22   > Pt may benefit from nerve block as outpatient for improved symptom control but he was receiving HBOT.

## 2022-03-25 NOTE — PROGRESS NOTE ADULT - SUBJECTIVE AND OBJECTIVE BOX
A Team Progress Note  [id number]    Subjective:   No acute events overnight  Stable pain, no bowel movement overnight, passing gas, no  n/v, no f/n/c, no chest pain or shortness of breath.    Objective:  Vital Signs  T(C): 36.6 (03-25 @ 07:38), Max: 37.4 (03-24 @ 21:22)  HR: 99 (03-25 @ 07:38) (93 - 101)  BP: 135/91 (03-25 @ 07:38) (118/80 - 138/94)  RR: 16 (03-25 @ 07:38) (16 - 18)  SpO2: 96% (03-25 @ 07:38) (96% - 100%)    I&O's Detail      Physical Exam:  GEN: resting in bed comfortably in NAD  RESP: no increased WOB  ABD: soft, non-distended, non-tender  EXTR: warm, well-perfused    Labs:                        11.0   8.29  )-----------( 374      ( 25 Mar 2022 05:12 )             34.5   03-25    139  |  105  |  9   ----------------------------<  102<H>  4.0   |  25  |  0.81    Ca    9.2      25 Mar 2022 05:12  Phos  3.9     03-25  Mg     2.20     03-25    TPro  6.4  /  Alb  3.0<L>  /  TBili  0.3  /  DBili  x   /  AST  12  /  ALT  11  /  AlkPhos  72  03-25    CAPILLARY BLOOD GLUCOSE          Medications:   MEDICATIONS  (STANDING):  atorvastatin 10 milliGRAM(s) Oral at bedtime  bictegravir 50 mG/emtricitabine 200 mG/tenofovir alafenamide 25 mG (BIKTARVY) 1 Tablet(s) Oral daily  chlorhexidine 2% Cloths 1 Application(s) Topical daily  gabapentin 300 milliGRAM(s) Oral <User Schedule>  metoprolol succinate ER 50 milliGRAM(s) Oral daily  morphine ER Tablet 90 milliGRAM(s) Oral every 8 hours  OLANZapine 10 milliGRAM(s) Oral at bedtime  polyethylene glycol 3350 17 Gram(s) Oral two times a day  psyllium Powder 1 Packet(s) Oral two times a day  senna 2 Tablet(s) Oral two times a day  sodium chloride 0.9%. 1000 milliLiter(s) (75 mL/Hr) IV Continuous <Continuous>    MEDICATIONS  (PRN):  acetaminophen     Tablet .. 650 milliGRAM(s) Oral every 6 hours PRN Temp greater or equal to 38C (100.4F), Mild Pain (1 - 3)  ALPRAZolam 1 milliGRAM(s) Oral daily PRN anxiety  HYDROmorphone  Injectable 1 milliGRAM(s) IV Push every 3 hours PRN Moderate Pain (4 - 6)  HYDROmorphone  Injectable 2 milliGRAM(s) IV Push every 3 hours PRN Severe Pain (7 - 10)  zolpidem 5 milliGRAM(s) Oral at bedtime PRN Insomnia      Imaging:

## 2022-03-25 NOTE — PROGRESS NOTE ADULT - PROBLEM SELECTOR PLAN 1
-patient with acute on chronic rectal pain associated w/ hematochezia and constipation  -pain in setting of multiple radiation treatments to the area due to h/o anal cancer and prostate cancer  -chronically on MS contin 60mg q8 and Dilaudid PO 8mg q4, which has not been adequately controlling the pain  -pain associated with yellow drainage from the area  - MRI showing perianal abscess- pt went to OR today 3/25- per OR note - severe induration seen circumferentially with ulcerated mucosa and recessed cavity on left side inferior to true lumen. No definite abscess seen therefore no drainage or sphincterotomy was performed. Surgery spoke with ACP stating pt will need diverting colostomy  - Continue zosyn for now. ID following  - Continue IV Dilaudid prn  - Palliative consult following  for pain management  - c/w bowel regimen (home relistor now given iv)

## 2022-03-25 NOTE — PROGRESS NOTE ADULT - PROBLEM SELECTOR PLAN 4
MRI a/p: A 2.9 cm left perianal abscess. No evidence of associated fistula.  > Colorectal surgery note reviewed- rectal ulcer found intraoperatively   > appreciate GI recs - consider sucralafate enemas if ongoing bleeding if patient can tolerate   > f/u colorectal surgery recs   > On IV abx   > pain management as above.

## 2022-03-25 NOTE — PROGRESS NOTE ADULT - SUBJECTIVE AND OBJECTIVE BOX
Patient is a 56y old  Male who presents with a chief complaint of Rectal pain and bleeding, difficulty ambulating (25 Mar 2022 09:06)      SUBJECTIVE / OVERNIGHT EVENTS: Pt went to OR today for drainage of perianal abscess. Per operative note -> severe induration seen circumferentially with ulcerated mucosa and recessed cavity on left side inferior to true lumen. No definite abscess seen therefore no drainage or sphincterotomy was performed.    Surgery consult spoke with ACP stating pt will need diverting colostomy     ADDITIONAL REVIEW OF SYSTEMS:    MEDICATIONS  (STANDING):  atorvastatin 10 milliGRAM(s) Oral at bedtime  bictegravir 50 mG/emtricitabine 200 mG/tenofovir alafenamide 25 mG (BIKTARVY) 1 Tablet(s) Oral daily  chlorhexidine 2% Cloths 1 Application(s) Topical daily  gabapentin 300 milliGRAM(s) Oral <User Schedule>  metoprolol succinate ER 50 milliGRAM(s) Oral daily  morphine ER Tablet 90 milliGRAM(s) Oral every 8 hours  OLANZapine 10 milliGRAM(s) Oral at bedtime  polyethylene glycol 3350 17 Gram(s) Oral two times a day  psyllium Powder 1 Packet(s) Oral two times a day  senna 2 Tablet(s) Oral two times a day  sodium chloride 0.9%. 1000 milliLiter(s) (75 mL/Hr) IV Continuous <Continuous>    MEDICATIONS  (PRN):  acetaminophen     Tablet .. 650 milliGRAM(s) Oral every 6 hours PRN Temp greater or equal to 38C (100.4F), Mild Pain (1 - 3)  ALPRAZolam 1 milliGRAM(s) Oral daily PRN anxiety  fentaNYL    Injectable 25 MICROGram(s) IV Push every 5 minutes PRN Mild Pain (1 - 3)  fentaNYL    Injectable 50 MICROGram(s) IV Push every 5 minutes PRN Severe Pain (7 - 10)  HYDROmorphone  Injectable 0.5 milliGRAM(s) IV Push every 10 minutes PRN Moderate Pain (4 - 6)  HYDROmorphone  Injectable 3 milliGRAM(s) IV Push every 3 hours PRN Severe Pain (7 - 10)  HYDROmorphone  Injectable 1 milliGRAM(s) IV Push every 3 hours PRN Moderate Pain (4 - 6)  ondansetron Injectable 4 milliGRAM(s) IV Push once PRN Nausea and/or Vomiting  zolpidem 5 milliGRAM(s) Oral at bedtime PRN Insomnia      CAPILLARY BLOOD GLUCOSE        I&O's Summary      PHYSICAL EXAM:  Vital Signs Last 24 Hrs  T(C): 36.3 (25 Mar 2022 10:55), Max: 37.4 (24 Mar 2022 21:22)  T(F): 97.3 (25 Mar 2022 10:55), Max: 99.4 (24 Mar 2022 21:22)  HR: 106 (25 Mar 2022 10:55) (93 - 112)  BP: 151/94 (25 Mar 2022 10:55) (118/80 - 167/106)  BP(mean): 111 (25 Mar 2022 10:00) (108 - 126)  RR: 18 (25 Mar 2022 10:55) (13 - 22)  SpO2: 97% (25 Mar 2022 10:55) (95% - 100%)  CONSTITUTIONAL: NAD, well-developed, well-groomed  EYES: PERRLA; conjunctiva and sclera clear  ENMT: Moist oral mucosa, no pharyngeal injection or exudates; normal dentition  NECK: Supple, no palpable masses; no thyromegaly  RESPIRATORY: Normal respiratory effort; lungs are clear to auscultation bilaterally  CARDIOVASCULAR: Regular rate and rhythm, normal S1 and S2, no murmur/rub/gallop; No lower extremity edema; Peripheral pulses are 2+ bilaterally  ABDOMEN: Nontender to palpation, normoactive bowel sounds, no rebound/guarding; No hepatosplenomegaly  MUSCULOSKELETAL:  Normal gait; no clubbing or cyanosis of digits; no joint swelling or tenderness to palpation  PSYCH: A+O to person, place, and time; affect appropriate  NEUROLOGY: CN 2-12 are intact and symmetric; no gross sensory deficits   SKIN: No rashes; no palpable lesions    LABS:                        11.0   8.29  )-----------( 374      ( 25 Mar 2022 05:12 )             34.5     03-25    139  |  105  |  9   ----------------------------<  102<H>  4.0   |  25  |  0.81    Ca    9.2      25 Mar 2022 05:12  Phos  3.9     03-25  Mg     2.20     03-25    TPro  6.4  /  Alb  3.0<L>  /  TBili  0.3  /  DBili  x   /  AST  12  /  ALT  11  /  AlkPhos  72  03-25    PT/INR - ( 25 Mar 2022 05:12 )   PT: 15.1 sec;   INR: 1.30 ratio                     RADIOLOGY & ADDITIONAL TESTS:  Results Reviewed:   Imaging Personally Reviewed:  Electrocardiogram Personally Reviewed:    COORDINATION OF CARE:  Care Discussed with Consultants/Other Providers [Y/N]:  Prior or Outpatient Records Reviewed [Y/N]:

## 2022-03-25 NOTE — PROGRESS NOTE ADULT - ASSESSMENT
56yM w/ Possible perianal fistula    Recommendations:  - OR today  - Maintain antibiotics per ID    - Please page [phone] w/ any questions

## 2022-03-25 NOTE — PROGRESS NOTE ADULT - PROBLEM SELECTOR PLAN 2
Patient reports difficulty with BM. Currently needs to have BM but is hesitant to go post operatively.   > Last BM 3/23. continue to monitor BMs   > On relistor daily per outpatient records- 450mg PO qday in AM   > continue miralax bid, senna 2 tabs bid.

## 2022-03-25 NOTE — PROGRESS NOTE ADULT - PROBLEM SELECTOR PLAN 5
PMH anal cancer (dx 4/2021) s/p radiation and chemo (last 8/2021) complicated by an ulcer  > Pt completed radiation and chemo (8/2021) at [facility].

## 2022-03-25 NOTE — PROGRESS NOTE ADULT - PROBLEM SELECTOR PLAN 6
Thank you for allowing us to participate in your patient's care. We will continue to follow with you. Please page [phone] for any q's or c's.     Terri Roberts D.O.   Palliative Medicine.

## 2022-03-25 NOTE — PRE-OP CHECKLIST - LOOSE TEETH
Pre-Visit Chart Review  For Appointment Scheduled on 5-26-17    There are no preventive care reminders to display for this patient.                
no

## 2022-03-26 LAB
ANION GAP SERPL CALC-SCNC: 10 MMOL/L — SIGNIFICANT CHANGE UP (ref 7–14)
BUN SERPL-MCNC: 8 MG/DL — SIGNIFICANT CHANGE UP (ref 7–23)
C TRACH RRNA SPEC QL NAA+PROBE: SIGNIFICANT CHANGE UP
CALCIUM SERPL-MCNC: 9.1 MG/DL — SIGNIFICANT CHANGE UP (ref 8.4–10.5)
CHLORIDE SERPL-SCNC: 101 MMOL/L — SIGNIFICANT CHANGE UP (ref 98–107)
CO2 SERPL-SCNC: 27 MMOL/L — SIGNIFICANT CHANGE UP (ref 22–31)
CREAT SERPL-MCNC: 0.79 MG/DL — SIGNIFICANT CHANGE UP (ref 0.5–1.3)
EGFR: 105 ML/MIN/1.73M2 — SIGNIFICANT CHANGE UP
GLUCOSE SERPL-MCNC: 107 MG/DL — HIGH (ref 70–99)
HCT VFR BLD CALC: 32.6 % — LOW (ref 39–50)
HGB BLD-MCNC: 10.7 G/DL — LOW (ref 13–17)
MAGNESIUM SERPL-MCNC: 2.3 MG/DL — SIGNIFICANT CHANGE UP (ref 1.6–2.6)
MCHC RBC-ENTMCNC: 28.3 PG — SIGNIFICANT CHANGE UP (ref 27–34)
MCHC RBC-ENTMCNC: 32.8 GM/DL — SIGNIFICANT CHANGE UP (ref 32–36)
MCV RBC AUTO: 86.2 FL — SIGNIFICANT CHANGE UP (ref 80–100)
N GONORRHOEA RRNA SPEC QL NAA+PROBE: SIGNIFICANT CHANGE UP
NRBC # BLD: 0 /100 WBCS — SIGNIFICANT CHANGE UP
NRBC # FLD: 0 K/UL — SIGNIFICANT CHANGE UP
PHOSPHATE SERPL-MCNC: 3.8 MG/DL — SIGNIFICANT CHANGE UP (ref 2.5–4.5)
PLATELET # BLD AUTO: 391 K/UL — SIGNIFICANT CHANGE UP (ref 150–400)
POTASSIUM SERPL-MCNC: 3.7 MMOL/L — SIGNIFICANT CHANGE UP (ref 3.5–5.3)
POTASSIUM SERPL-SCNC: 3.7 MMOL/L — SIGNIFICANT CHANGE UP (ref 3.5–5.3)
RBC # BLD: 3.78 M/UL — LOW (ref 4.2–5.8)
RBC # FLD: 13.5 % — SIGNIFICANT CHANGE UP (ref 10.3–14.5)
SODIUM SERPL-SCNC: 138 MMOL/L — SIGNIFICANT CHANGE UP (ref 135–145)
SPECIMEN SOURCE: SIGNIFICANT CHANGE UP
WBC # BLD: 7.86 K/UL — SIGNIFICANT CHANGE UP (ref 3.8–10.5)
WBC # FLD AUTO: 7.86 K/UL — SIGNIFICANT CHANGE UP (ref 3.8–10.5)

## 2022-03-26 PROCEDURE — 99232 SBSQ HOSP IP/OBS MODERATE 35: CPT

## 2022-03-26 RX ORDER — PIPERACILLIN AND TAZOBACTAM 4; .5 G/20ML; G/20ML
3.38 INJECTION, POWDER, LYOPHILIZED, FOR SOLUTION INTRAVENOUS EVERY 8 HOURS
Refills: 0 | Status: DISCONTINUED | OUTPATIENT
Start: 2022-03-26 | End: 2022-03-30

## 2022-03-26 RX ADMIN — SENNA PLUS 2 TABLET(S): 8.6 TABLET ORAL at 06:43

## 2022-03-26 RX ADMIN — HYDROMORPHONE HYDROCHLORIDE 3 MILLIGRAM(S): 2 INJECTION INTRAMUSCULAR; INTRAVENOUS; SUBCUTANEOUS at 02:06

## 2022-03-26 RX ADMIN — OLANZAPINE 10 MILLIGRAM(S): 15 TABLET, FILM COATED ORAL at 21:41

## 2022-03-26 RX ADMIN — HYDROMORPHONE HYDROCHLORIDE 3 MILLIGRAM(S): 2 INJECTION INTRAMUSCULAR; INTRAVENOUS; SUBCUTANEOUS at 20:17

## 2022-03-26 RX ADMIN — POLYETHYLENE GLYCOL 3350 17 GRAM(S): 17 POWDER, FOR SOLUTION ORAL at 19:22

## 2022-03-26 RX ADMIN — HYDROMORPHONE HYDROCHLORIDE 3 MILLIGRAM(S): 2 INJECTION INTRAMUSCULAR; INTRAVENOUS; SUBCUTANEOUS at 23:51

## 2022-03-26 RX ADMIN — MORPHINE SULFATE 90 MILLIGRAM(S): 50 CAPSULE, EXTENDED RELEASE ORAL at 18:11

## 2022-03-26 RX ADMIN — MORPHINE SULFATE 90 MILLIGRAM(S): 50 CAPSULE, EXTENDED RELEASE ORAL at 21:41

## 2022-03-26 RX ADMIN — HYDROMORPHONE HYDROCHLORIDE 3 MILLIGRAM(S): 2 INJECTION INTRAMUSCULAR; INTRAVENOUS; SUBCUTANEOUS at 12:31

## 2022-03-26 RX ADMIN — GABAPENTIN 300 MILLIGRAM(S): 400 CAPSULE ORAL at 13:06

## 2022-03-26 RX ADMIN — PIPERACILLIN AND TAZOBACTAM 25 GRAM(S): 4; .5 INJECTION, POWDER, LYOPHILIZED, FOR SOLUTION INTRAVENOUS at 13:09

## 2022-03-26 RX ADMIN — MORPHINE SULFATE 90 MILLIGRAM(S): 50 CAPSULE, EXTENDED RELEASE ORAL at 06:51

## 2022-03-26 RX ADMIN — Medication 1 PACKET(S): at 06:43

## 2022-03-26 RX ADMIN — GABAPENTIN 300 MILLIGRAM(S): 400 CAPSULE ORAL at 21:42

## 2022-03-26 RX ADMIN — PIPERACILLIN AND TAZOBACTAM 25 GRAM(S): 4; .5 INJECTION, POWDER, LYOPHILIZED, FOR SOLUTION INTRAVENOUS at 21:43

## 2022-03-26 RX ADMIN — PIPERACILLIN AND TAZOBACTAM 25 GRAM(S): 4; .5 INJECTION, POWDER, LYOPHILIZED, FOR SOLUTION INTRAVENOUS at 02:32

## 2022-03-26 RX ADMIN — HYDROMORPHONE HYDROCHLORIDE 3 MILLIGRAM(S): 2 INJECTION INTRAMUSCULAR; INTRAVENOUS; SUBCUTANEOUS at 03:06

## 2022-03-26 RX ADMIN — CHLORHEXIDINE GLUCONATE 1 APPLICATION(S): 213 SOLUTION TOPICAL at 13:05

## 2022-03-26 RX ADMIN — HYDROMORPHONE HYDROCHLORIDE 3 MILLIGRAM(S): 2 INJECTION INTRAMUSCULAR; INTRAVENOUS; SUBCUTANEOUS at 16:40

## 2022-03-26 RX ADMIN — SENNA PLUS 2 TABLET(S): 8.6 TABLET ORAL at 19:22

## 2022-03-26 RX ADMIN — HYDROMORPHONE HYDROCHLORIDE 3 MILLIGRAM(S): 2 INJECTION INTRAMUSCULAR; INTRAVENOUS; SUBCUTANEOUS at 13:00

## 2022-03-26 RX ADMIN — HYDROMORPHONE HYDROCHLORIDE 3 MILLIGRAM(S): 2 INJECTION INTRAMUSCULAR; INTRAVENOUS; SUBCUTANEOUS at 09:21

## 2022-03-26 RX ADMIN — HYDROMORPHONE HYDROCHLORIDE 3 MILLIGRAM(S): 2 INJECTION INTRAMUSCULAR; INTRAVENOUS; SUBCUTANEOUS at 14:01

## 2022-03-26 RX ADMIN — HYDROMORPHONE HYDROCHLORIDE 3 MILLIGRAM(S): 2 INJECTION INTRAMUSCULAR; INTRAVENOUS; SUBCUTANEOUS at 18:11

## 2022-03-26 RX ADMIN — ZOLPIDEM TARTRATE 5 MILLIGRAM(S): 10 TABLET ORAL at 21:52

## 2022-03-26 RX ADMIN — Medication 50 MILLIGRAM(S): at 06:43

## 2022-03-26 RX ADMIN — MORPHINE SULFATE 90 MILLIGRAM(S): 50 CAPSULE, EXTENDED RELEASE ORAL at 15:27

## 2022-03-26 RX ADMIN — GABAPENTIN 300 MILLIGRAM(S): 400 CAPSULE ORAL at 09:24

## 2022-03-26 RX ADMIN — Medication 1 PACKET(S): at 19:22

## 2022-03-26 RX ADMIN — MORPHINE SULFATE 90 MILLIGRAM(S): 50 CAPSULE, EXTENDED RELEASE ORAL at 14:11

## 2022-03-26 RX ADMIN — ATORVASTATIN CALCIUM 10 MILLIGRAM(S): 80 TABLET, FILM COATED ORAL at 21:43

## 2022-03-26 RX ADMIN — BICTEGRAVIR SODIUM, EMTRICITABINE, AND TENOFOVIR ALAFENAMIDE FUMARATE 1 TABLET(S): 30; 120; 15 TABLET ORAL at 21:41

## 2022-03-26 RX ADMIN — HYDROMORPHONE HYDROCHLORIDE 3 MILLIGRAM(S): 2 INJECTION INTRAMUSCULAR; INTRAVENOUS; SUBCUTANEOUS at 20:02

## 2022-03-26 NOTE — PROGRESS NOTE ADULT - PROBLEM SELECTOR PLAN 2
-when admitted, patient met SIRS criteria w/ tachycardia and tachypnea  -subjective fevers and chills at home, however afebrile in ED and per patient all measured temps at home <100  -no infectious symptoms  -received 1 dose vanc/zosyn in ED  - given HIV and possible fistula was continued on empiric antibiotics, completed 7d course of zosyn on 3/25, Final bcx 3/17 neg    - Had fever 3/25 night, Bcx ordered.   - Pt recently completed 7d course of zosyn on 3/25 but zosyn was re-ordered per rec of ID & Colorect sx.   -ID following f/u for further rec

## 2022-03-26 NOTE — PROGRESS NOTE ADULT - SUBJECTIVE AND OBJECTIVE BOX
Patient is a 56y old  Male who presents with a chief complaint of Rectal pain and bleeding, difficulty ambulating (25 Mar 2022 09:06)      SUBJECTIVE / OVERNIGHT EVENTS:  Pt noted to have fever O/N (100.4), Bcx ordered. Pt recently completed 7d course of zosyn on 3/25 but zosyn was re-ordered per rec of ID & Colorect sx.   At time of encounter pt afebrile, has low grade tachycardia       ADDITIONAL REVIEW OF SYSTEMS:    MEDICATIONS  (STANDING):  atorvastatin 10 milliGRAM(s) Oral at bedtime  bictegravir 50 mG/emtricitabine 200 mG/tenofovir alafenamide 25 mG (BIKTARVY) 1 Tablet(s) Oral daily  chlorhexidine 2% Cloths 1 Application(s) Topical daily  gabapentin 300 milliGRAM(s) Oral <User Schedule>  metoprolol succinate ER 50 milliGRAM(s) Oral daily  morphine ER Tablet 90 milliGRAM(s) Oral every 8 hours  OLANZapine 10 milliGRAM(s) Oral at bedtime  piperacillin/tazobactam IVPB.. 3.375 Gram(s) IV Intermittent every 8 hours  polyethylene glycol 3350 17 Gram(s) Oral two times a day  psyllium Powder 1 Packet(s) Oral two times a day  senna 2 Tablet(s) Oral two times a day  sodium chloride 0.9%. 1000 milliLiter(s) (75 mL/Hr) IV Continuous <Continuous>    MEDICATIONS  (PRN):  acetaminophen     Tablet .. 650 milliGRAM(s) Oral every 6 hours PRN Temp greater or equal to 38C (100.4F), Mild Pain (1 - 3)  ALPRAZolam 1 milliGRAM(s) Oral daily PRN anxiety  HYDROmorphone  Injectable 1 milliGRAM(s) IV Push every 3 hours PRN Moderate Pain (4 - 6)  HYDROmorphone  Injectable 3 milliGRAM(s) IV Push every 3 hours PRN Severe Pain (7 - 10)  melatonin 3 milliGRAM(s) Oral at bedtime PRN Insomnia  zolpidem 5 milliGRAM(s) Oral at bedtime PRN Insomnia      CAPILLARY BLOOD GLUCOSE        I&O's Summary      PHYSICAL EXAM:  Vital Signs Last 24 Hrs  T(C): 37.2 (26 Mar 2022 13:00), Max: 38 (26 Mar 2022 01:36)  T(F): 99 (26 Mar 2022 13:00), Max: 100.4 (26 Mar 2022 01:36)  HR: 100 (26 Mar 2022 13:00) (96 - 105)  BP: 141/85 (26 Mar 2022 13:00) (129/94 - 149/101)  BP(mean): --  RR: 18 (26 Mar 2022 13:00) (17 - 18)  SpO2: 98% (26 Mar 2022 13:00) (95% - 98%)    CONSTITUTIONAL: NAD, well-developed, well-groomed  EYES: PERRLA; conjunctiva and sclera clear  ENMT: Moist oral mucosa, no pharyngeal injection or exudates; normal dentition  NECK: Supple, no palpable masses; no thyromegaly  RESPIRATORY: Normal respiratory effort; lungs are clear to auscultation bilaterally  CARDIOVASCULAR: low grade tachycardia, normal S1 and S2, no murmur/rub/gallop; No lower extremity edema; Peripheral pulses are 2+ bilaterally  ABDOMEN: Nontender to palpation, normoactive bowel sounds, no rebound/guarding; No hepatosplenomegaly  MUSCULOSKELETAL:  Normal gait; no clubbing or cyanosis of digits; no joint swelling or tenderness to palpation  PSYCH: A+O to person, place, and time; affect appropriate  NEUROLOGY: CN 2-12 are intact and symmetric; no gross sensory deficits   SKIN: No rashes; no palpable lesions    LABS:                             10.7   7.86  )-----------( 391      ( 26 Mar 2022 07:14 )             32.6     03-26    138  |  101  |  8   ----------------------------<  107<H>  3.7   |  27  |  0.79    Ca    9.1      26 Mar 2022 07:14  Phos  3.8     03-26  Mg     2.30     03-26    TPro  6.4  /  Alb  3.0<L>  /  TBili  0.3  /  DBili  x   /  AST  12  /  ALT  11  /  AlkPhos  72  03-25    PT/INR - ( 25 Mar 2022 05:12 )   PT: 15.1 sec;   INR: 1.30 ratio               RADIOLOGY & ADDITIONAL TESTS:  Results Reviewed:   Imaging Personally Reviewed:  Electrocardiogram Personally Reviewed:    COORDINATION OF CARE:  Care Discussed with Consultants/Other Providers [Y/N]:  Prior or Outpatient Records Reviewed [Y/N]:

## 2022-03-26 NOTE — PROGRESS NOTE ADULT - PROBLEM SELECTOR PLAN 1
-patient with acute on chronic rectal pain associated w/ hematochezia and constipation  -pain in setting of multiple radiation treatments to the area due to h/o anal cancer and prostate cancer  -chronically on MS contin 60mg q8 and Dilaudid PO 8mg q4, which has not been adequately controlling the pain  -pain associated with yellow drainage from the area  - MRI showing perianal abscess- pt went to OR today 3/25- per OR note - severe induration seen circumferentially with ulcerated mucosa and recessed cavity on left side inferior to true lumen. No definite abscess seen therefore no drainage or sphincterotomy was performed. Surgery spoke with ACP stating pt will need diverting colostomy  - Continue zosyn for now. ID following  - Continue IV Dilaudid prn  - Palliative consult following  for pain management  - c/w bowel regimen (home relistor now given iv)    - Waiting for follow up recs from Surgery and definitive plan rg diverting colostomy

## 2022-03-26 NOTE — CHART NOTE - NSCHARTNOTEFT_GEN_A_CORE
Notified by RN that patient  has an oral temp of 100.4 F at 0135 and repeat temp at  0148 am is 99 F. 55 y/o M w/ PMH rectal cancer s/p radiation, prostate cancer s/p radiation, HIV on biktarvy, diverticulosis w/ h/o diverticulitis, anxiety, depression, HTN, chronic constipation presents with uncontrollable rectal pain, bleeding, subjective fevers, chills, diaphoresis, and difficulty ambulating. MRI pelvis, rectal and Anal showed perianal abscess. Patient is s/p Exam under anesthesia with rectal biopsy and rigid sigmoidoscopy which showed severe induration seen circumferentially with ulcerated mucosa and recessed cavity on left side inferior to true lumen. No definite abscess seen therefore no drainage or sphincterotomy was performed. Patient was on Zosyn empirically and was completed 7 day course on 3/25/22.  Chart reviewed, ID  and colorectal surgery has recommended to continue Zosyn.  Will send blood culture x2 . Reordered Zosyn. Recommended RN to repeat vital signs after an hour. Team to follow up with ID in Am.    T(C): 37.2 (26 Mar 2022 01:48), Max: 38 (26 Mar 2022 01:36)  T(F): 99 (26 Mar 2022 01:48), Max: 100.4 (26 Mar 2022 01:36)  HR: 96 (26 Mar 2022 01:36) (96 - 112)  BP: 133/95 (26 Mar 2022 01:36) (118/80 - 167/106)  BP(mean): 111 (25 Mar 2022 10:00) (108 - 126)  RR: 18 (26 Mar 2022 01:36) (13 - 22)  SpO2: 95% (26 Mar 2022 01:36) (95% - 98%)

## 2022-03-26 NOTE — PROGRESS NOTE ADULT - PROBLEM SELECTOR PLAN 3
-acute on chronic gait instability over the past 3 days  -neuro exam benign, 5/5 in UE b/l, 5/5 strength in RLE, and 4/5 strength in LLE which he states is his baseline  -usually ambulates without assistance, now is using cane  -more likely 2/2 anal pain radiating down legs, less likely due to other causes such as new mets  - PT rec home PT

## 2022-03-26 NOTE — PROGRESS NOTE ADULT - PROBLEM SELECTOR PLAN 4
-likely AOCD from malignancy vs KAREN, however patient does have blood in toilet after bowel movements and blood on toilet paper  -Trend Hb, transfuse for <7

## 2022-03-26 NOTE — PROGRESS NOTE ADULT - ASSESSMENT
56yM s/p eua showing no abscess but showing cavity inferior to true lumen, induration, ulceration.    Recommendations:  - abx per ID  - fu fever workup  - patient will likely need diverting ostomy for healing, to be discussed with patient by attending    - Please page [phone] w/ any questions

## 2022-03-26 NOTE — PROGRESS NOTE ADULT - SUBJECTIVE AND OBJECTIVE BOX
A Team Progress Note  [id number]    Subjective:   Patient seen at bedside this AM. Pain is improving, no bowel movement, tolerating diet. Had fever 100.4, no chest pain or shortness of breath.    Objective:  Vital Signs  T(C): 37.4 (03-26 @ 06:37), Max: 38 (03-26 @ 01:36)  HR: 105 (03-26 @ 06:37) (96 - 106)  BP: 146/89 (03-26 @ 06:37) (133/95 - 154/92)  RR: 18 (03-26 @ 06:37) (13 - 22)  SpO2: 96% (03-26 @ 06:37) (95% - 98%)    I&O's Detail      Physical Exam:  GEN: resting in bed comfortably in NAD  RESP: no increased WOB  ABD: dressing taken off, no bleeding  EXTR: warm, well-perfused    Labs:                        10.7   7.86  )-----------( 391      ( 26 Mar 2022 07:14 )             32.6   03-26    138  |  101  |  8   ----------------------------<  107<H>  3.7   |  27  |  0.79    Ca    9.1      26 Mar 2022 07:14  Phos  3.8     03-26  Mg     2.30     03-26    TPro  6.4  /  Alb  3.0<L>  /  TBili  0.3  /  DBili  x   /  AST  12  /  ALT  11  /  AlkPhos  72  03-25    CAPILLARY BLOOD GLUCOSE          Medications:   MEDICATIONS  (STANDING):  atorvastatin 10 milliGRAM(s) Oral at bedtime  bictegravir 50 mG/emtricitabine 200 mG/tenofovir alafenamide 25 mG (BIKTARVY) 1 Tablet(s) Oral daily  chlorhexidine 2% Cloths 1 Application(s) Topical daily  gabapentin 300 milliGRAM(s) Oral <User Schedule>  metoprolol succinate ER 50 milliGRAM(s) Oral daily  morphine ER Tablet 90 milliGRAM(s) Oral every 8 hours  OLANZapine 10 milliGRAM(s) Oral at bedtime  piperacillin/tazobactam IVPB.. 3.375 Gram(s) IV Intermittent every 8 hours  polyethylene glycol 3350 17 Gram(s) Oral two times a day  psyllium Powder 1 Packet(s) Oral two times a day  senna 2 Tablet(s) Oral two times a day  sodium chloride 0.9%. 1000 milliLiter(s) (75 mL/Hr) IV Continuous <Continuous>    MEDICATIONS  (PRN):  acetaminophen     Tablet .. 650 milliGRAM(s) Oral every 6 hours PRN Temp greater or equal to 38C (100.4F), Mild Pain (1 - 3)  ALPRAZolam 1 milliGRAM(s) Oral daily PRN anxiety  HYDROmorphone  Injectable 1 milliGRAM(s) IV Push every 3 hours PRN Moderate Pain (4 - 6)  HYDROmorphone  Injectable 3 milliGRAM(s) IV Push every 3 hours PRN Severe Pain (7 - 10)  melatonin 3 milliGRAM(s) Oral at bedtime PRN Insomnia  zolpidem 5 milliGRAM(s) Oral at bedtime PRN Insomnia      Imaging:

## 2022-03-27 LAB
ANION GAP SERPL CALC-SCNC: 11 MMOL/L — SIGNIFICANT CHANGE UP (ref 7–14)
BUN SERPL-MCNC: 8 MG/DL — SIGNIFICANT CHANGE UP (ref 7–23)
CALCIUM SERPL-MCNC: 9.2 MG/DL — SIGNIFICANT CHANGE UP (ref 8.4–10.5)
CHLORIDE SERPL-SCNC: 101 MMOL/L — SIGNIFICANT CHANGE UP (ref 98–107)
CO2 SERPL-SCNC: 26 MMOL/L — SIGNIFICANT CHANGE UP (ref 22–31)
CREAT SERPL-MCNC: 0.76 MG/DL — SIGNIFICANT CHANGE UP (ref 0.5–1.3)
EGFR: 106 ML/MIN/1.73M2 — SIGNIFICANT CHANGE UP
GLUCOSE SERPL-MCNC: 110 MG/DL — HIGH (ref 70–99)
HCT VFR BLD CALC: 33.2 % — LOW (ref 39–50)
HGB BLD-MCNC: 10.8 G/DL — LOW (ref 13–17)
MAGNESIUM SERPL-MCNC: 2.3 MG/DL — SIGNIFICANT CHANGE UP (ref 1.6–2.6)
MCHC RBC-ENTMCNC: 28.1 PG — SIGNIFICANT CHANGE UP (ref 27–34)
MCHC RBC-ENTMCNC: 32.5 GM/DL — SIGNIFICANT CHANGE UP (ref 32–36)
MCV RBC AUTO: 86.5 FL — SIGNIFICANT CHANGE UP (ref 80–100)
NRBC # BLD: 0 /100 WBCS — SIGNIFICANT CHANGE UP
NRBC # FLD: 0 K/UL — SIGNIFICANT CHANGE UP
PHOSPHATE SERPL-MCNC: 3.4 MG/DL — SIGNIFICANT CHANGE UP (ref 2.5–4.5)
PLATELET # BLD AUTO: 387 K/UL — SIGNIFICANT CHANGE UP (ref 150–400)
POTASSIUM SERPL-MCNC: 4.1 MMOL/L — SIGNIFICANT CHANGE UP (ref 3.5–5.3)
POTASSIUM SERPL-SCNC: 4.1 MMOL/L — SIGNIFICANT CHANGE UP (ref 3.5–5.3)
RBC # BLD: 3.84 M/UL — LOW (ref 4.2–5.8)
RBC # FLD: 13.7 % — SIGNIFICANT CHANGE UP (ref 10.3–14.5)
SODIUM SERPL-SCNC: 138 MMOL/L — SIGNIFICANT CHANGE UP (ref 135–145)
WBC # BLD: 8.55 K/UL — SIGNIFICANT CHANGE UP (ref 3.8–10.5)
WBC # FLD AUTO: 8.55 K/UL — SIGNIFICANT CHANGE UP (ref 3.8–10.5)

## 2022-03-27 PROCEDURE — 99232 SBSQ HOSP IP/OBS MODERATE 35: CPT

## 2022-03-27 RX ADMIN — GABAPENTIN 300 MILLIGRAM(S): 400 CAPSULE ORAL at 21:14

## 2022-03-27 RX ADMIN — GABAPENTIN 300 MILLIGRAM(S): 400 CAPSULE ORAL at 13:14

## 2022-03-27 RX ADMIN — GABAPENTIN 300 MILLIGRAM(S): 400 CAPSULE ORAL at 09:35

## 2022-03-27 RX ADMIN — ZOLPIDEM TARTRATE 5 MILLIGRAM(S): 10 TABLET ORAL at 22:31

## 2022-03-27 RX ADMIN — BICTEGRAVIR SODIUM, EMTRICITABINE, AND TENOFOVIR ALAFENAMIDE FUMARATE 1 TABLET(S): 30; 120; 15 TABLET ORAL at 22:29

## 2022-03-27 RX ADMIN — HYDROMORPHONE HYDROCHLORIDE 3 MILLIGRAM(S): 2 INJECTION INTRAMUSCULAR; INTRAVENOUS; SUBCUTANEOUS at 18:27

## 2022-03-27 RX ADMIN — OLANZAPINE 10 MILLIGRAM(S): 15 TABLET, FILM COATED ORAL at 21:14

## 2022-03-27 RX ADMIN — HYDROMORPHONE HYDROCHLORIDE 3 MILLIGRAM(S): 2 INJECTION INTRAMUSCULAR; INTRAVENOUS; SUBCUTANEOUS at 13:30

## 2022-03-27 RX ADMIN — HYDROMORPHONE HYDROCHLORIDE 3 MILLIGRAM(S): 2 INJECTION INTRAMUSCULAR; INTRAVENOUS; SUBCUTANEOUS at 09:58

## 2022-03-27 RX ADMIN — POLYETHYLENE GLYCOL 3350 17 GRAM(S): 17 POWDER, FOR SOLUTION ORAL at 17:30

## 2022-03-27 RX ADMIN — HYDROMORPHONE HYDROCHLORIDE 3 MILLIGRAM(S): 2 INJECTION INTRAMUSCULAR; INTRAVENOUS; SUBCUTANEOUS at 13:15

## 2022-03-27 RX ADMIN — Medication 1 PACKET(S): at 05:37

## 2022-03-27 RX ADMIN — SENNA PLUS 2 TABLET(S): 8.6 TABLET ORAL at 17:28

## 2022-03-27 RX ADMIN — MORPHINE SULFATE 90 MILLIGRAM(S): 50 CAPSULE, EXTENDED RELEASE ORAL at 05:29

## 2022-03-27 RX ADMIN — MORPHINE SULFATE 90 MILLIGRAM(S): 50 CAPSULE, EXTENDED RELEASE ORAL at 21:45

## 2022-03-27 RX ADMIN — HYDROMORPHONE HYDROCHLORIDE 3 MILLIGRAM(S): 2 INJECTION INTRAMUSCULAR; INTRAVENOUS; SUBCUTANEOUS at 00:06

## 2022-03-27 RX ADMIN — PIPERACILLIN AND TAZOBACTAM 25 GRAM(S): 4; .5 INJECTION, POWDER, LYOPHILIZED, FOR SOLUTION INTRAVENOUS at 05:36

## 2022-03-27 RX ADMIN — PIPERACILLIN AND TAZOBACTAM 25 GRAM(S): 4; .5 INJECTION, POWDER, LYOPHILIZED, FOR SOLUTION INTRAVENOUS at 13:19

## 2022-03-27 RX ADMIN — Medication 1 PACKET(S): at 17:31

## 2022-03-27 RX ADMIN — ATORVASTATIN CALCIUM 10 MILLIGRAM(S): 80 TABLET, FILM COATED ORAL at 21:14

## 2022-03-27 RX ADMIN — HYDROMORPHONE HYDROCHLORIDE 3 MILLIGRAM(S): 2 INJECTION INTRAMUSCULAR; INTRAVENOUS; SUBCUTANEOUS at 22:38

## 2022-03-27 RX ADMIN — POLYETHYLENE GLYCOL 3350 17 GRAM(S): 17 POWDER, FOR SOLUTION ORAL at 05:37

## 2022-03-27 RX ADMIN — Medication 3 MILLIGRAM(S): at 21:14

## 2022-03-27 RX ADMIN — Medication 50 MILLIGRAM(S): at 05:35

## 2022-03-27 RX ADMIN — HYDROMORPHONE HYDROCHLORIDE 3 MILLIGRAM(S): 2 INJECTION INTRAMUSCULAR; INTRAVENOUS; SUBCUTANEOUS at 05:29

## 2022-03-27 RX ADMIN — PIPERACILLIN AND TAZOBACTAM 25 GRAM(S): 4; .5 INJECTION, POWDER, LYOPHILIZED, FOR SOLUTION INTRAVENOUS at 21:14

## 2022-03-27 RX ADMIN — MORPHINE SULFATE 90 MILLIGRAM(S): 50 CAPSULE, EXTENDED RELEASE ORAL at 13:14

## 2022-03-27 RX ADMIN — HYDROMORPHONE HYDROCHLORIDE 3 MILLIGRAM(S): 2 INJECTION INTRAMUSCULAR; INTRAVENOUS; SUBCUTANEOUS at 05:44

## 2022-03-27 RX ADMIN — SENNA PLUS 2 TABLET(S): 8.6 TABLET ORAL at 05:37

## 2022-03-27 RX ADMIN — HYDROMORPHONE HYDROCHLORIDE 3 MILLIGRAM(S): 2 INJECTION INTRAMUSCULAR; INTRAVENOUS; SUBCUTANEOUS at 09:43

## 2022-03-27 RX ADMIN — CHLORHEXIDINE GLUCONATE 1 APPLICATION(S): 213 SOLUTION TOPICAL at 13:19

## 2022-03-27 NOTE — CHART NOTE - NSCHARTNOTEFT_GEN_A_CORE
56M admitted w/ uncontrollable rectal pain, bleeding, subjective fevers, chills, diaphoresis, and difficulty ambulating.  Followed by GI and ID, completed 7d course IV Zosyn (3/17-3/25)    Patient febrile 100.4 on 3/26 early AM, BCx sent (NGTD), restarted IV Zosyn.  Patient has remained afebrile with Temp 98-99.6 since that time.    ID contacted today for further recommendations, recs per Dr. Lawler as follows:  - Continue IV Zosyn for now  - ID to follow tomorrow for further recs    Will continue to monitor    Celsa Gonzalez NP-BC  Department of Medicine  In House Pager #[phone]

## 2022-03-27 NOTE — PROGRESS NOTE ADULT - PROBLEM SELECTOR PLAN 9
BP saft on arrival, currently stable   Continue metoprolol 50 given tachycardia  Hold HCTZ and losartan.

## 2022-03-27 NOTE — PROGRESS NOTE ADULT - SUBJECTIVE AND OBJECTIVE BOX
Patient is a 56y old  Male who presents with a chief complaint of Rectal pain and bleeding, difficulty ambulating (25 Mar 2022 09:06)      SUBJECTIVE / OVERNIGHT EVENTS:  Afebrile today,  s/p fever on night of 3/25.  Continued on Abx. Pt reports no compliants otherwise     ADDITIONAL REVIEW OF SYSTEMS:    MEDICATIONS  (STANDING):  atorvastatin 10 milliGRAM(s) Oral at bedtime  bictegravir 50 mG/emtricitabine 200 mG/tenofovir alafenamide 25 mG (BIKTARVY) 1 Tablet(s) Oral daily  chlorhexidine 2% Cloths 1 Application(s) Topical daily  gabapentin 300 milliGRAM(s) Oral <User Schedule>  metoprolol succinate ER 50 milliGRAM(s) Oral daily  morphine ER Tablet 90 milliGRAM(s) Oral every 8 hours  OLANZapine 10 milliGRAM(s) Oral at bedtime  piperacillin/tazobactam IVPB.. 3.375 Gram(s) IV Intermittent every 8 hours  polyethylene glycol 3350 17 Gram(s) Oral two times a day  psyllium Powder 1 Packet(s) Oral two times a day  senna 2 Tablet(s) Oral two times a day  sodium chloride 0.9%. 1000 milliLiter(s) (75 mL/Hr) IV Continuous <Continuous>    MEDICATIONS  (PRN):  acetaminophen     Tablet .. 650 milliGRAM(s) Oral every 6 hours PRN Temp greater or equal to 38C (100.4F), Mild Pain (1 - 3)  ALPRAZolam 1 milliGRAM(s) Oral daily PRN anxiety  HYDROmorphone  Injectable 1 milliGRAM(s) IV Push every 3 hours PRN Moderate Pain (4 - 6)  HYDROmorphone  Injectable 3 milliGRAM(s) IV Push every 3 hours PRN Severe Pain (7 - 10)  melatonin 3 milliGRAM(s) Oral at bedtime PRN Insomnia  zolpidem 5 milliGRAM(s) Oral at bedtime PRN Insomnia      CAPILLARY BLOOD GLUCOSE        I&O's Summary      PHYSICAL EXAM:  Vital Signs Last 24 Hrs  T(C): 37.4 (27 Mar 2022 05:29), Max: 37.6 (26 Mar 2022 20:00)  T(F): 99.4 (27 Mar 2022 05:29), Max: 99.6 (26 Mar 2022 20:00)  HR: 101 (27 Mar 2022 06:41) (100 - 110)  BP: 131/92 (27 Mar 2022 05:29) (116/86 - 147/93)  BP(mean): --  RR: 17 (27 Mar 2022 05:29) (17 - 18)  SpO2: 97% (27 Mar 2022 05:29) (95% - 98%)      CONSTITUTIONAL: NAD, well-developed, well-groomed  EYES: PERRLA; conjunctiva and sclera clear  ENMT: Moist oral mucosa, no pharyngeal injection or exudates; normal dentition  NECK: Supple, no palpable masses; no thyromegaly  RESPIRATORY: Normal respiratory effort; lungs are clear to auscultation bilaterally  CARDIOVASCULAR: low grade tachycardia, normal S1 and S2, no murmur/rub/gallop; No lower extremity edema; Peripheral pulses are 2+ bilaterally  ABDOMEN: Nontender to palpation, normoactive bowel sounds, no rebound/guarding; No hepatosplenomegaly  MUSCULOSKELETAL:  Normal gait; no clubbing or cyanosis of digits; no joint swelling or tenderness to palpation  PSYCH: A+O to person, place, and time; affect appropriate  NEUROLOGY: CN 2-12 are intact and symmetric; no gross sensory deficits   SKIN: No rashes; no palpable lesions    LABS:                               10.8   8.55  )-----------( 387      ( 27 Mar 2022 08:19 )             33.2     03-27    138  |  101  |  8   ----------------------------<  110<H>  4.1   |  26  |  0.76    Ca    9.2      27 Mar 2022 07:42  Phos  3.4     03-27  Mg     2.30     03-27        RADIOLOGY & ADDITIONAL TESTS:  Results Reviewed:   Imaging Personally Reviewed:  Electrocardiogram Personally Reviewed:    COORDINATION OF CARE:  Care Discussed with Consultants/Other Providers [Y/N]:  Prior or Outpatient Records Reviewed [Y/N]:

## 2022-03-27 NOTE — PROGRESS NOTE ADULT - PROBLEM SELECTOR PLAN 1
-patient with acute on chronic rectal pain associated w/ hematochezia and constipation  -pain in setting of multiple radiation treatments to the area due to h/o anal cancer and prostate cancer  -chronically on MS contin 60mg q8 and Dilaudid PO 8mg q4, which has not been adequately controlling the pain  -pain associated with yellow drainage from the area  - MRI showing perianal abscess- pt went to OR today 3/25- per OR note - severe induration seen circumferentially with ulcerated mucosa and recessed cavity on left side inferior to true lumen. No definite abscess seen therefore no drainage or sphincterotomy was performed. Surgery spoke with ACP stating pt will need diverting colostomy, f/u Surg rg when  - Continue zosyn for now. ID following and will re-eval in AM   - Continue IV Dilaudid prn  - Palliative consult following  for pain management  - c/w bowel regimen (home relistor now given iv)

## 2022-03-27 NOTE — PROGRESS NOTE ADULT - PROBLEM SELECTOR PLAN 2
-when admitted, patient met SIRS criteria w/ tachycardia and tachypnea  -subjective fevers and chills at home, however afebrile in ED and per patient all measured temps at home <100  -no infectious symptoms  -received 1 dose vanc/zosyn in ED  - given HIV and possible fistula was continued on empiric antibiotics, completed 7d course of zosyn on 3/25, Final bcx 3/17 neg  - Had fever on night of 3/25, Bcx ordered, neg thus far   - Pt recently completed 7d course of zosyn on 3/25 but zosyn was re-ordered per rec of ID & Colorect sx.   -ID following f/u, ID to re-eval need for continued zosyn

## 2022-03-28 ENCOUNTER — APPOINTMENT (OUTPATIENT)
Dept: HYPERBARIC MEDICINE | Facility: CLINIC | Age: 57
End: 2022-03-28

## 2022-03-28 LAB
ANION GAP SERPL CALC-SCNC: 8 MMOL/L — SIGNIFICANT CHANGE UP (ref 7–14)
BUN SERPL-MCNC: 10 MG/DL — SIGNIFICANT CHANGE UP (ref 7–23)
CALCIUM SERPL-MCNC: 9 MG/DL — SIGNIFICANT CHANGE UP (ref 8.4–10.5)
CHLORIDE SERPL-SCNC: 102 MMOL/L — SIGNIFICANT CHANGE UP (ref 98–107)
CO2 SERPL-SCNC: 26 MMOL/L — SIGNIFICANT CHANGE UP (ref 22–31)
CREAT SERPL-MCNC: 0.77 MG/DL — SIGNIFICANT CHANGE UP (ref 0.5–1.3)
EGFR: 106 ML/MIN/1.73M2 — SIGNIFICANT CHANGE UP
GLUCOSE SERPL-MCNC: 102 MG/DL — HIGH (ref 70–99)
HCT VFR BLD CALC: 31.6 % — LOW (ref 39–50)
HGB BLD-MCNC: 10.4 G/DL — LOW (ref 13–17)
MAGNESIUM SERPL-MCNC: 2.1 MG/DL — SIGNIFICANT CHANGE UP (ref 1.6–2.6)
MCHC RBC-ENTMCNC: 28.4 PG — SIGNIFICANT CHANGE UP (ref 27–34)
MCHC RBC-ENTMCNC: 32.9 GM/DL — SIGNIFICANT CHANGE UP (ref 32–36)
MCV RBC AUTO: 86.3 FL — SIGNIFICANT CHANGE UP (ref 80–100)
NRBC # BLD: 0 /100 WBCS — SIGNIFICANT CHANGE UP
NRBC # FLD: 0 K/UL — SIGNIFICANT CHANGE UP
PHOSPHATE SERPL-MCNC: 3.6 MG/DL — SIGNIFICANT CHANGE UP (ref 2.5–4.5)
PLATELET # BLD AUTO: 393 K/UL — SIGNIFICANT CHANGE UP (ref 150–400)
POTASSIUM SERPL-MCNC: 3.9 MMOL/L — SIGNIFICANT CHANGE UP (ref 3.5–5.3)
POTASSIUM SERPL-SCNC: 3.9 MMOL/L — SIGNIFICANT CHANGE UP (ref 3.5–5.3)
RBC # BLD: 3.66 M/UL — LOW (ref 4.2–5.8)
RBC # FLD: 13.7 % — SIGNIFICANT CHANGE UP (ref 10.3–14.5)
SODIUM SERPL-SCNC: 136 MMOL/L — SIGNIFICANT CHANGE UP (ref 135–145)
WBC # BLD: 8.05 K/UL — SIGNIFICANT CHANGE UP (ref 3.8–10.5)
WBC # FLD AUTO: 8.05 K/UL — SIGNIFICANT CHANGE UP (ref 3.8–10.5)

## 2022-03-28 PROCEDURE — 99232 SBSQ HOSP IP/OBS MODERATE 35: CPT

## 2022-03-28 PROCEDURE — 99233 SBSQ HOSP IP/OBS HIGH 50: CPT

## 2022-03-28 RX ORDER — POLYETHYLENE GLYCOL 3350 17 G/17G
17 POWDER, FOR SOLUTION ORAL
Refills: 0 | Status: DISCONTINUED | OUTPATIENT
Start: 2022-03-28 | End: 2022-03-30

## 2022-03-28 RX ORDER — HYDROMORPHONE HYDROCHLORIDE 2 MG/ML
8 INJECTION INTRAMUSCULAR; INTRAVENOUS; SUBCUTANEOUS EVERY 4 HOURS
Refills: 0 | Status: DISCONTINUED | OUTPATIENT
Start: 2022-03-28 | End: 2022-03-30

## 2022-03-28 RX ORDER — LACTULOSE 10 G/15ML
10 SOLUTION ORAL EVERY 6 HOURS
Refills: 0 | Status: DISCONTINUED | OUTPATIENT
Start: 2022-03-28 | End: 2022-03-30

## 2022-03-28 RX ADMIN — HYDROMORPHONE HYDROCHLORIDE 3 MILLIGRAM(S): 2 INJECTION INTRAMUSCULAR; INTRAVENOUS; SUBCUTANEOUS at 02:40

## 2022-03-28 RX ADMIN — HYDROMORPHONE HYDROCHLORIDE 3 MILLIGRAM(S): 2 INJECTION INTRAMUSCULAR; INTRAVENOUS; SUBCUTANEOUS at 09:01

## 2022-03-28 RX ADMIN — HYDROMORPHONE HYDROCHLORIDE 3 MILLIGRAM(S): 2 INJECTION INTRAMUSCULAR; INTRAVENOUS; SUBCUTANEOUS at 12:14

## 2022-03-28 RX ADMIN — MORPHINE SULFATE 90 MILLIGRAM(S): 50 CAPSULE, EXTENDED RELEASE ORAL at 22:20

## 2022-03-28 RX ADMIN — HYDROMORPHONE HYDROCHLORIDE 3 MILLIGRAM(S): 2 INJECTION INTRAMUSCULAR; INTRAVENOUS; SUBCUTANEOUS at 12:30

## 2022-03-28 RX ADMIN — ATORVASTATIN CALCIUM 10 MILLIGRAM(S): 80 TABLET, FILM COATED ORAL at 21:59

## 2022-03-28 RX ADMIN — GABAPENTIN 300 MILLIGRAM(S): 400 CAPSULE ORAL at 21:52

## 2022-03-28 RX ADMIN — SENNA PLUS 2 TABLET(S): 8.6 TABLET ORAL at 17:07

## 2022-03-28 RX ADMIN — OLANZAPINE 10 MILLIGRAM(S): 15 TABLET, FILM COATED ORAL at 21:59

## 2022-03-28 RX ADMIN — POLYETHYLENE GLYCOL 3350 17 GRAM(S): 17 POWDER, FOR SOLUTION ORAL at 17:07

## 2022-03-28 RX ADMIN — GABAPENTIN 300 MILLIGRAM(S): 400 CAPSULE ORAL at 08:43

## 2022-03-28 RX ADMIN — POLYETHYLENE GLYCOL 3350 17 GRAM(S): 17 POWDER, FOR SOLUTION ORAL at 21:59

## 2022-03-28 RX ADMIN — HYDROMORPHONE HYDROCHLORIDE 3 MILLIGRAM(S): 2 INJECTION INTRAMUSCULAR; INTRAVENOUS; SUBCUTANEOUS at 23:05

## 2022-03-28 RX ADMIN — MORPHINE SULFATE 90 MILLIGRAM(S): 50 CAPSULE, EXTENDED RELEASE ORAL at 13:07

## 2022-03-28 RX ADMIN — MORPHINE SULFATE 90 MILLIGRAM(S): 50 CAPSULE, EXTENDED RELEASE ORAL at 06:14

## 2022-03-28 RX ADMIN — Medication 50 MILLIGRAM(S): at 06:06

## 2022-03-28 RX ADMIN — HYDROMORPHONE HYDROCHLORIDE 3 MILLIGRAM(S): 2 INJECTION INTRAMUSCULAR; INTRAVENOUS; SUBCUTANEOUS at 22:05

## 2022-03-28 RX ADMIN — HYDROMORPHONE HYDROCHLORIDE 3 MILLIGRAM(S): 2 INJECTION INTRAMUSCULAR; INTRAVENOUS; SUBCUTANEOUS at 19:20

## 2022-03-28 RX ADMIN — ZOLPIDEM TARTRATE 5 MILLIGRAM(S): 10 TABLET ORAL at 23:01

## 2022-03-28 RX ADMIN — SENNA PLUS 2 TABLET(S): 8.6 TABLET ORAL at 05:38

## 2022-03-28 RX ADMIN — CHLORHEXIDINE GLUCONATE 1 APPLICATION(S): 213 SOLUTION TOPICAL at 12:19

## 2022-03-28 RX ADMIN — LACTULOSE 10 GRAM(S): 10 SOLUTION ORAL at 19:23

## 2022-03-28 RX ADMIN — HYDROMORPHONE HYDROCHLORIDE 3 MILLIGRAM(S): 2 INJECTION INTRAMUSCULAR; INTRAVENOUS; SUBCUTANEOUS at 05:39

## 2022-03-28 RX ADMIN — POLYETHYLENE GLYCOL 3350 17 GRAM(S): 17 POWDER, FOR SOLUTION ORAL at 05:38

## 2022-03-28 RX ADMIN — Medication 1 PACKET(S): at 05:38

## 2022-03-28 RX ADMIN — HYDROMORPHONE HYDROCHLORIDE 3 MILLIGRAM(S): 2 INJECTION INTRAMUSCULAR; INTRAVENOUS; SUBCUTANEOUS at 15:37

## 2022-03-28 RX ADMIN — PIPERACILLIN AND TAZOBACTAM 25 GRAM(S): 4; .5 INJECTION, POWDER, LYOPHILIZED, FOR SOLUTION INTRAVENOUS at 22:49

## 2022-03-28 RX ADMIN — HYDROMORPHONE HYDROCHLORIDE 3 MILLIGRAM(S): 2 INJECTION INTRAMUSCULAR; INTRAVENOUS; SUBCUTANEOUS at 15:55

## 2022-03-28 RX ADMIN — PIPERACILLIN AND TAZOBACTAM 25 GRAM(S): 4; .5 INJECTION, POWDER, LYOPHILIZED, FOR SOLUTION INTRAVENOUS at 05:40

## 2022-03-28 RX ADMIN — BICTEGRAVIR SODIUM, EMTRICITABINE, AND TENOFOVIR ALAFENAMIDE FUMARATE 1 TABLET(S): 30; 120; 15 TABLET ORAL at 21:59

## 2022-03-28 RX ADMIN — HYDROMORPHONE HYDROCHLORIDE 3 MILLIGRAM(S): 2 INJECTION INTRAMUSCULAR; INTRAVENOUS; SUBCUTANEOUS at 08:44

## 2022-03-28 RX ADMIN — GABAPENTIN 300 MILLIGRAM(S): 400 CAPSULE ORAL at 12:14

## 2022-03-28 RX ADMIN — HYDROMORPHONE HYDROCHLORIDE 3 MILLIGRAM(S): 2 INJECTION INTRAMUSCULAR; INTRAVENOUS; SUBCUTANEOUS at 19:05

## 2022-03-28 RX ADMIN — PIPERACILLIN AND TAZOBACTAM 25 GRAM(S): 4; .5 INJECTION, POWDER, LYOPHILIZED, FOR SOLUTION INTRAVENOUS at 13:08

## 2022-03-28 NOTE — PROGRESS NOTE ADULT - ASSESSMENT
56yM s/p eua showing no abscess but showing cavity inferior to true lumen, induration, ulceration. Discussion had with patient to have a colostomy for diversion of stool to allow area to heal. Patient in agreement.    Recommendations:  - abx per ID  - patient on schedule for a laparoscopic loop colostomy on Wednesday 3/30  - please optimize  - Preop laps COVID and type and screen within 72 hours, bmp with mg, ph and cbc    - Please page [phone] w/ any questions

## 2022-03-28 NOTE — PROGRESS NOTE ADULT - SUBJECTIVE AND OBJECTIVE BOX
A Team Progress Note  [id number]    Subjective:   Patient seen at bedside this AM. Stable amount of pain. no f/n/c, no chest pain or shortness of breath. No bowel movement since Friday 3/25. No n/v, passing gas.    Objective:  Vital Signs  T(C): 37.4 (03-28 @ 05:14), Max: 37.4 (03-28 @ 05:14)  HR: 107 (03-28 @ 05:14) (98 - 107)  BP: 126/95 (03-28 @ 06:36) (117/71 - 153/98)  RR: 18 (03-28 @ 05:14) (18 - 18)  SpO2: 100% (03-28 @ 05:14) (97% - 100%)    I&O's Detail      Physical Exam:  GEN: resting in bed comfortably in NAD  RESP: no increased WOB  EXTR: warm, well-perfused    Labs:                        10.4   8.05  )-----------( 393      ( 28 Mar 2022 06:29 )             31.6   03-28    136  |  102  |  10  ----------------------------<  102<H>  3.9   |  26  |  0.77    Ca    9.0      28 Mar 2022 06:29  Phos  3.6     03-28  Mg     2.10     03-28      CAPILLARY BLOOD GLUCOSE          Medications:   MEDICATIONS  (STANDING):  atorvastatin 10 milliGRAM(s) Oral at bedtime  bictegravir 50 mG/emtricitabine 200 mG/tenofovir alafenamide 25 mG (BIKTARVY) 1 Tablet(s) Oral daily  chlorhexidine 2% Cloths 1 Application(s) Topical daily  gabapentin 300 milliGRAM(s) Oral <User Schedule>  metoprolol succinate ER 50 milliGRAM(s) Oral daily  morphine ER Tablet 90 milliGRAM(s) Oral every 8 hours  OLANZapine 10 milliGRAM(s) Oral at bedtime  piperacillin/tazobactam IVPB.. 3.375 Gram(s) IV Intermittent every 8 hours  polyethylene glycol 3350 17 Gram(s) Oral two times a day  psyllium Powder 1 Packet(s) Oral two times a day  senna 2 Tablet(s) Oral two times a day    MEDICATIONS  (PRN):  acetaminophen     Tablet .. 650 milliGRAM(s) Oral every 6 hours PRN Temp greater or equal to 38C (100.4F), Mild Pain (1 - 3)  ALPRAZolam 1 milliGRAM(s) Oral daily PRN anxiety  HYDROmorphone  Injectable 1 milliGRAM(s) IV Push every 3 hours PRN Moderate Pain (4 - 6)  HYDROmorphone  Injectable 3 milliGRAM(s) IV Push every 3 hours PRN Severe Pain (7 - 10)  melatonin 3 milliGRAM(s) Oral at bedtime PRN Insomnia  zolpidem 5 milliGRAM(s) Oral at bedtime PRN Insomnia      Imaging:

## 2022-03-28 NOTE — PROGRESS NOTE ADULT - PROBLEM SELECTOR PLAN 2
-when admitted, patient met SIRS criteria w/ tachycardia and tachypnea  -subjective fevers and chills at home, however afebrile in ED and per patient all measured temps at home <100  -no infectious symptoms  -received 1 dose vanc/zosyn in ED  - given HIV and possible fistula was continued on empiric antibiotics, completed 7d course of zosyn on 3/25, Final bcx 3/17 neg  - Had fever on night of 3/25, Bcx ordered, neg thus far   - Pt recently completed 7d course of zosyn on 3/25 but zosyn was re-ordered per rec of ID & Colorect sx.   -ID following f/u, ID to re-eval need for continued zosyn no BM 3 days - increase miralax to tid, ATC lactulose until BM, c/w Senna; d/c Metamucil

## 2022-03-28 NOTE — CHART NOTE - NSCHARTNOTEFT_GEN_A_CORE
Source: Patient [x ]    Family [ ]     other [x ] chart review    Patient seen for nutrition f/u for mild malnutrition. 56 year old male with a PMH of rectal cancer s/p radiation, prostate cancer s/p radiation, HIV on biktarvy, diverticulosis w/ h/o diverticulitis, anxiety, depression, HTN, chronic constipation presents with uncontrollable rectal pain, bleeding, subjective fevers, chills, diaphoresis, and difficulty ambulating, plan for laparoscopic loop colostomy (3/30) per chart.    Patient reports good appetite, noted with intakes 51-75% and % per RN flow sheet. Patient amenable to consume ensure max (150 kcal, 30 g pro) to meet protein needs. Reports no N/V. Reports constipation, on bowel regimen and psyllium per chart. No chewing/swallowing difficulties reported. No edema or pressure injuries noted per RN flow sheet.    Diet : Diet, Regular (03-18-22 @ 02:25)    Current Weight: Weight (kg): 95.3 (03-25 @ 07:42)  95 kg (3/18)  Weight Change: stable weight    Pertinent Medications: MEDICATIONS  (STANDING):  atorvastatin 10 milliGRAM(s) Oral at bedtime  bictegravir 50 mG/emtricitabine 200 mG/tenofovir alafenamide 25 mG (BIKTARVY) 1 Tablet(s) Oral daily  chlorhexidine 2% Cloths 1 Application(s) Topical daily  gabapentin 300 milliGRAM(s) Oral <User Schedule>  metoprolol succinate ER 50 milliGRAM(s) Oral daily  morphine ER Tablet 90 milliGRAM(s) Oral every 8 hours  OLANZapine 10 milliGRAM(s) Oral at bedtime  piperacillin/tazobactam IVPB.. 3.375 Gram(s) IV Intermittent every 8 hours  polyethylene glycol 3350 17 Gram(s) Oral two times a day  psyllium Powder 1 Packet(s) Oral two times a day  senna 2 Tablet(s) Oral two times a day    MEDICATIONS  (PRN):  acetaminophen     Tablet .. 650 milliGRAM(s) Oral every 6 hours PRN Temp greater or equal to 38C (100.4F), Mild Pain (1 - 3)  ALPRAZolam 1 milliGRAM(s) Oral daily PRN anxiety  HYDROmorphone  Injectable 1 milliGRAM(s) IV Push every 3 hours PRN Moderate Pain (4 - 6)  HYDROmorphone  Injectable 3 milliGRAM(s) IV Push every 3 hours PRN Severe Pain (7 - 10)  melatonin 3 milliGRAM(s) Oral at bedtime PRN Insomnia  zolpidem 5 milliGRAM(s) Oral at bedtime PRN Insomnia    Pertinent Labs:  03-28 Na136 mmol/L Glu 102 mg/dL<H> K+ 3.9 mmol/L Cr  0.77 mg/dL BUN 10 mg/dL 03-28 Phos 3.6 mg/dL 03-25 Alb 3.0 g/dL<L>    Estimated Needs:   [x ] no change since previous assessment  [ ] recalculated:     Previous Nutrition Diagnosis: Mild malnutrition    Nutrition Diagnosis is [x ] ongoing  [ ] resolved [ ] not applicable     Recommend  - continue diet as ordered. Consider low fiber therapeutic diet after planned colostomy.  - consider addition of ensure max 1x daily (150 kcal, 30 g pro)  - obtain weekly weight and document PO intake to monitor trend    Monitoring and Evaluation:   [x ] PO intake [ x] Tolerance to diet prescription [x ] weights [x ] follow up per protocol

## 2022-03-28 NOTE — PROGRESS NOTE ADULT - ASSESSMENT
55 y/o M w/ PMH rectal cancer s/p radiation, prostate cancer s/p radiation, HIV on biktarvy, diverticulosis w/ h/o diverticulitis, anxiety, depression, HTN, chronic constipation presents with uncontrollable rectal pain, bleeding, subjective fevers, chills, diaphoresis, and difficulty ambulating. Palliative consulted for symptom management in setting of rectal cancer.

## 2022-03-28 NOTE — PROGRESS NOTE ADULT - SUBJECTIVE AND OBJECTIVE BOX
56y old  Male who presents with a chief complaint of Rectal pain and bleeding, difficulty ambulating (28 Mar 2022 17:32)      Interval history:  Afebrile, still with significant pain in rectal area but improving, + constipation.       Allergies:   No Known Allergies      Antimicrobials:  bictegravir 50 mG/emtricitabine 200 mG/tenofovir alafenamide 25 mG (BIKTARVY) 1 Tablet(s) Oral daily  piperacillin/tazobactam IVPB.. 3.375 Gram(s) IV Intermittent every 8 hours      REVIEW OF SYSTEMS:  No chest pain   No SOB  No abdominal pain  No rash.       Vital Signs Last 24 Hrs  T(C): 37.1 (03-28-22 @ 17:47), Max: 37.4 (03-28-22 @ 05:14)  T(F): 98.7 (03-28-22 @ 17:47), Max: 99.3 (03-28-22 @ 05:14)  HR: 94 (03-28-22 @ 17:47) (94 - 107)  BP: 132/90 (03-28-22 @ 17:47) (121/81 - 153/98)  BP(mean): --  RR: 18 (03-28-22 @ 17:47) (18 - 18)  SpO2: 98% (03-28-22 @ 17:47) (95% - 100%)      PHYSICAL EXAM:  Patient in no acute distress. Alert, awake.  Cardiovascular: S1S2 normal.  Lungs: + air entry B/L lung fields.  Gastrointestinal: soft, nontender, nondistended.  perirectal area with significant induration, tender.   Extremities: no edema.  IV sites not inflamed.                           10.4   8.05  )-----------( 393      ( 28 Mar 2022 06:29 )             31.6   03-28    136  |  102  |  10  ----------------------------<  102<H>  3.9   |  26  |  0.77    Ca    9.0      28 Mar 2022 06:29  Phos  3.6     03-28  Mg     2.10     03-28        Culture - Blood (collected 26 Mar 2022 06:53)  Source: .Blood Blood-Venous  Preliminary Report (27 Mar 2022 07:01):    No growth to date.    Culture - Blood (collected 26 Mar 2022 06:53)  Source: .Blood Blood-Peripheral  Preliminary Report (27 Mar 2022 07:01):    No growth to date.

## 2022-03-28 NOTE — PROGRESS NOTE ADULT - PROBLEM SELECTOR PLAN 1
-patient with acute on chronic rectal pain associated w/ hematochezia and constipation  -pain in setting of multiple radiation treatments to the area due to h/o anal cancer and prostate cancer  -chronically on MS contin 60mg q8 and Dilaudid PO 8mg q4, which has not been adequately controlling the pain  -pain associated with yellow drainage from the area  - MRI showing perianal abscess- pt went to OR today 3/25- per OR note - severe induration seen circumferentially with ulcerated mucosa and recessed cavity on left side inferior to true lumen. No definite abscess seen therefore no drainage or sphincterotomy was performed. Surgery spoke with ACP stating pt will need diverting colostomy, f/u Surg rg when  - Continue zosyn for now. ID following and will re-eval in AM   - Continue IV Dilaudid prn  - Palliative consult following  for pain management  - c/w bowel regimen (home relistor now given iv) -patient with acute on chronic rectal pain associated w/ hematochezia and constipation  -pain in setting of multiple radiation treatments to the area due to h/o anal cancer and prostate cancer  -chronically on MS contin 60mg q8 and Dilaudid PO 8mg q4, which has not been adequately controlling the pain  -pain associated with yellow drainage from the area  - MRI showing perianal abscess- pt went to OR today 3/25- per OR note - severe induration seen circumferentially with ulcerated mucosa and recessed cavity on left side inferior to true lumen. No definite abscess seen therefore no drainage or sphincterotomy was performed. Surgery recommend diverting colostomy -  pt agrees  - Continue zosyn for now  - Continue IV Dilaudid prn  - Palliative consult following  for pain management  - c/w bowel regimen (home relistor now given iv) ????

## 2022-03-28 NOTE — PROGRESS NOTE ADULT - PROBLEM SELECTOR PLAN 1
Patient follows with outpatient palliative team: Dr. Colon and Neena Borrego at [facility].   > At home, patient on MS Contin 60mg q8hrs and PO dilaudid 4-8mg q4h prn. He was receiving hyperbaric oxygen treatments outpatient.   > Continue MS Contin 60mg tid to 90mg TID   > Transitioned moderate PRN to his home dilaudid of 8mg q4hr prn   > Continue severe PRNs to 3mg IV dilaudid q3 hr on 3/25. Has utilized PRNs x7  > added sitz baths   > Continue gabapentin to 300mg tid (9am, 1pm, 9pm)   > Pt may benefit from nerve block as outpatient for improved symptom control but he was receiving HBOT.

## 2022-03-28 NOTE — PROGRESS NOTE ADULT - PROBLEM SELECTOR PLAN 5
- Patient compliant with biktarvy, will continue inpatient. CD4 count 947 in 5/21, now 239  - Undetectable viral load   - ID consulted, rec continuing Biktarvy -likely AOCD from malignancy vs KAREN, however patient does have blood in toilet after bowel movements and blood on toilet paper  -Trend Hb, transfuse for <7

## 2022-03-28 NOTE — PROGRESS NOTE ADULT - SUBJECTIVE AND OBJECTIVE BOX
[facility] and Palliative [facility]  Terri Roberts, Palliative Care Attending  Contact Info: Page [phone] (including Nights/Weekends), message on Microsoft Teams (Terri Roberts), or leave  at Palliative Office [phone] (non-urgent)     SUBJECTIVE AND OBJECTIVE: Patient seen this morning and in the afternoon with wife, Trinity, at bedside. Patient reports improved pain control but is having constipation. He states he has not had a BM for over 3 days.     INTERVAL HPI/OVERNIGHT EVENTS:  > 3/22: Over the past 24 hours, patient required PRNs of IV dilaudid 1.5mg x5, IV dilaudid 1mg x1, and ambien 5mg x1.   > 3/23: Over the past 24 hours, patient required PRNs of IV dilaudid 2mg x6.   > 3/25: s/p possible perianal abscess. Over the past 24 hours, patient required PRNs of IV dilaudid 2mg x5, ambien x1, xanax x1.   > 3/28: Over the past 24 hours, patient required PRNs of 3mg IV dilaudid x7.     DNR on chart:   Allergies    No Known Allergies    Intolerances    MEDICATIONS  (STANDING):  atorvastatin 10 milliGRAM(s) Oral at bedtime  bictegravir 50 mG/emtricitabine 200 mG/tenofovir alafenamide 25 mG (BIKTARVY) 1 Tablet(s) Oral daily  chlorhexidine 2% Cloths 1 Application(s) Topical daily  gabapentin 300 milliGRAM(s) Oral <User Schedule>  metoprolol succinate ER 50 milliGRAM(s) Oral daily  morphine ER Tablet 90 milliGRAM(s) Oral every 8 hours  OLANZapine 10 milliGRAM(s) Oral at bedtime  piperacillin/tazobactam IVPB.. 3.375 Gram(s) IV Intermittent every 8 hours  polyethylene glycol 3350 17 Gram(s) Oral two times a day  psyllium Powder 1 Packet(s) Oral two times a day  senna 2 Tablet(s) Oral two times a day    MEDICATIONS  (PRN):  acetaminophen     Tablet .. 650 milliGRAM(s) Oral every 6 hours PRN Temp greater or equal to 38C (100.4F), Mild Pain (1 - 3)  ALPRAZolam 1 milliGRAM(s) Oral daily PRN anxiety  HYDROmorphone  Injectable 3 milliGRAM(s) IV Push every 3 hours PRN Severe Pain (7 - 10)  HYDROmorphone  Injectable 1 milliGRAM(s) IV Push every 3 hours PRN Moderate Pain (4 - 6)  melatonin 3 milliGRAM(s) Oral at bedtime PRN Insomnia  zolpidem 5 milliGRAM(s) Oral at bedtime PRN Insomnia      ITEMS UNCHECKED ARE NOT PRESENT    PRESENT SYMPTOMS: [ ]Unable to obtain due to poor mentation   Source if other than patient:  [ ]Family   [ ]Team     Pain: [x ]yes [ ]no  QOL impact - difficulty ambulating   Location -    rectal pain, b/l leg pain                Aggravating factors - pain is constant, no pattern identified   Quality - sharp and stabbing   Radiation - rectal pain radiates to anterior groin   Timing- constant   Severity (0-10 scale): 8  Minimal acceptable level (0-10 scale): 2    Dyspnea:                           [ ]Mild [ ]Moderate [ ]Severe  Anxiety:                             [ ]Mild [ ]Moderate [ ]Severe  Fatigue:                             [ ]Mild [ ]Moderate [ ]Severe  Nausea:                             [ ]Mild [ ]Moderate [ ]Severe  Loss of appetite:              [ ]Mild [ ]Moderate [ ]Severe  Constipation:                    [ ]Mild [ ]Moderate [ ]Severe    PAIN AD Score:	  [email or web address] (Ctrl + left click to view)    Other Symptoms:  [ x]All other review of systems negative     Palliative Performance Status Version 2:  70       %      [email or web address]/[email or web address]_[email or web address]  PHYSICAL EXAM:  Vital Signs Last 24 Hrs  T(C): 36.7 (28 Mar 2022 14:18), Max: 37.4 (28 Mar 2022 05:14)  T(F): 98.1 (28 Mar 2022 14:18), Max: 99.3 (28 Mar 2022 05:14)  HR: 98 (28 Mar 2022 14:18) (98 - 107)  BP: 142/92 (28 Mar 2022 17:00) (121/81 - 153/98)  BP(mean): --  RR: 18 (28 Mar 2022 14:18) (18 - 18)  SpO2: 95% (28 Mar 2022 14:18) (95% - 100%) I&O's Summary     GENERAL:  [x ]Alert  [x ]Oriented x4   [ ]Lethargic  [ ]Cachexia  [ ]Unarousable  [x ]Verbal  [ ]Non-Verbal  Behavioral:   [ ] Anxiety  [ ] Delirium [ ] Agitation [x ] Other  HEENT:  [ x]Normal   [ ]Dry mouth   [ ]ET Tube/Trach  [ ]Oral lesions  PULMONARY:   [x ]Clear [ ]Tachypnea  [ ]Audible excessive secretions   [ ]Rhonchi        [ ]Right [ ]Left [ ]Bilateral  [ ]Crackles        [ ]Right [ ]Left [ ]Bilateral  [ ]Wheezing     [ ]Right [ ]Left [ ]Bilateral  [ ]Diminished breath sounds [ ]right [ ]left [ ]bilateral  CARDIOVASCULAR:    [ ]Regular [ ]Irregular [x ]Tachy  [ ]Vladimir [ ]Murmur [ ]Other  GASTROINTESTINAL:  [ x]Soft  [ ]Distended   [ ]+BS  [ ]Non tender [ ]Tender  [ ]PEG [ ]OGT/ NGT  Last BM: 3/24  GENITOURINARY: please see flowsheets   [ ]Normal [ ] Incontinent   [ ]Oliguria/Anuria   [ ]Sahu  MUSCULOSKELETAL:   [ x]Normal   [ ]Weakness  [ ]Bed/Wheelchair bound [ ]Edema  NEUROLOGIC:   [x ]No focal deficits  [ ]Cognitive impairment  [ ]Dysphagia [ ]Dysarthria [ ]Paresis [ ]Other   SKIN: please see flowsheets   [ ]Normal    [ ]Rash  [ ]Pressure ulcer(s)       Present on admission [ ]y [ ]n    CRITICAL CARE:  [ ] Shock Present  [ ]Septic [ ]Cardiogenic [ ]Neurologic [ ]Hypovolemic  [ ]  Vasopressors [ ]  Inotropes   [ ]Respiratory failure present [ ]Mechanical ventilation [ ]Non-invasive ventilatory support [ ]High flow  [ ]Acute  [ ]Chronic [ ]Hypoxic  [ ]Hypercarbic [ ]Other  [ ]Other organ failure     LABS:                        10.4   8.05  )-----------( 393      ( 28 Mar 2022 06:29 )             31.6   03-28    136  |  102  |  10  ----------------------------<  102<H>  3.9   |  26  |  0.77    Ca    9.0      28 Mar 2022 06:29  Phos  3.6     03-28  Mg     2.10     03-28    RADIOLOGY & ADDITIONAL STUDIES: n/a    Protein Calorie Malnutrition Present: [x ]mild [ ]moderate [ ]severe [ ]underweight [ ]morbid obesity  https://www.andeal.org/vault/2440/web/files/ONC/Table_Clinical%20Characteristics%20to%20Document%20Malnutrition-White%20JV%20et%20al%202012.pdf    Height (cm): 180.3 (03-25-22 @ 07:42), 180.3 (11-26-21 @ 06:50), 180.3 (11-24-21 @ 10:02)  Weight (kg): 95.3 (03-25-22 @ 07:42), 101.6 (11-26-21 @ 06:50), 101.6 (11-24-21 @ 10:02)  BMI (kg/m2): 29.3 (03-25-22 @ 07:42), 31.3 (11-26-21 @ 06:50), 31.3 (11-24-21 @ 10:02)    [ ]PPSV2 < or = 30%  [ ]significant weight loss [ ]poor nutritional intake [ ]anasarca    [ ]Artificial Nutrition    REFERRALS:   [ ]Chaplaincy  [ ]Hospice  [ ]Child Life  [ ]Social Work  [ ]Case management [ ]Holistic Therapy

## 2022-03-28 NOTE — PROGRESS NOTE ADULT - PROBLEM SELECTOR PLAN 4
MRI a/p: A 2.9 cm left perianal abscess. No evidence of associated fistula.  > Colorectal surgery note reviewed- rectal ulcer found intraoperatively   > appreciate GI recs - consider sucralafate enemas if ongoing bleeding if patient can tolerate   > f/u colorectal surgery recs- plan for possible diverting colostomy on 3/30   > On IV abx- appreciate ID recs   > pain management as above.

## 2022-03-28 NOTE — PROGRESS NOTE ADULT - PROBLEM SELECTOR PLAN 6
-continue olanzapine 10mg daily - Patient compliant with biktarvy, will continue inpatient. CD4 count 947 in 5/21, now 239  - Undetectable viral load   - ID consulted, rec continuing Biktarvy

## 2022-03-28 NOTE — PROGRESS NOTE ADULT - PROBLEM SELECTOR PLAN 2
Patient reports difficulty with BM. Currently needs to have BM but is hesitant to go post operatively.   > Last BM 3/24. continue to monitor BMs   > On relistor daily per outpatient records- 450mg PO qday in AM   > continue miralax bid, senna 2 tabs bid  > added lactulose 10grams PO q6hrs (Special instructions: please give until patient has BM)   > d/c metamucil

## 2022-03-28 NOTE — PROGRESS NOTE ADULT - PROBLEM SELECTOR PLAN 6
Thank you for allowing us to participate in your patient's care. We will continue to follow with you. Please page [phone] for any q's or c's.     Terri Roberts D.O.   Palliative Medicine.

## 2022-03-28 NOTE — PROGRESS NOTE ADULT - SUBJECTIVE AND OBJECTIVE BOX
Patient is a 56y old  Male who presents with a chief complaint of Rectal pain and bleeding, difficulty ambulating (27 Mar 2022 13:24)      SUBJECTIVE / OVERNIGHT EVENTS:  Restarted Zosyn over weekend....    MEDICATIONS  (STANDING):  atorvastatin 10 milliGRAM(s) Oral at bedtime  bictegravir 50 mG/emtricitabine 200 mG/tenofovir alafenamide 25 mG (BIKTARVY) 1 Tablet(s) Oral daily  chlorhexidine 2% Cloths 1 Application(s) Topical daily  gabapentin 300 milliGRAM(s) Oral <User Schedule>  metoprolol succinate ER 50 milliGRAM(s) Oral daily  morphine ER Tablet 90 milliGRAM(s) Oral every 8 hours  OLANZapine 10 milliGRAM(s) Oral at bedtime  piperacillin/tazobactam IVPB.. 3.375 Gram(s) IV Intermittent every 8 hours  polyethylene glycol 3350 17 Gram(s) Oral two times a day  psyllium Powder 1 Packet(s) Oral two times a day  senna 2 Tablet(s) Oral two times a day    MEDICATIONS  (PRN):  acetaminophen     Tablet .. 650 milliGRAM(s) Oral every 6 hours PRN Temp greater or equal to 38C (100.4F), Mild Pain (1 - 3)  ALPRAZolam 1 milliGRAM(s) Oral daily PRN anxiety  HYDROmorphone  Injectable 1 milliGRAM(s) IV Push every 3 hours PRN Moderate Pain (4 - 6)  HYDROmorphone  Injectable 3 milliGRAM(s) IV Push every 3 hours PRN Severe Pain (7 - 10)  melatonin 3 milliGRAM(s) Oral at bedtime PRN Insomnia  zolpidem 5 milliGRAM(s) Oral at bedtime PRN Insomnia      CAPILLARY BLOOD GLUCOSE          I&O's Summary      Vital Signs Last 24 Hrs  T(C): 37.4 (28 Mar 2022 05:14), Max: 37.4 (28 Mar 2022 05:14)  T(F): 99.3 (28 Mar 2022 05:14), Max: 99.3 (28 Mar 2022 05:14)  HR: 107 (28 Mar 2022 05:14) (98 - 107)  BP: 126/95 (28 Mar 2022 06:36) (117/71 - 153/98)  BP(mean): --  RR: 18 (28 Mar 2022 05:14) (17 - 18)  SpO2: 100% (28 Mar 2022 05:14) (97% - 100%)    CONSTITUTIONAL: ....  EYES: PERRLA; conjunctiva and sclera clear  ENMT: Moist oral mucosa, no pharyngeal injection or exudates; normal dentition  NECK: Supple, no palpable masses; no thyromegaly  RESPIRATORY: Normal respiratory effort; lungs are clear to auscultation bilaterally  CARDIOVASCULAR: low grade tachycardia, normal S1 and S2, no murmur/rub/gallop; No lower extremity edema; Peripheral pulses are 2+ bilaterally  ABDOMEN: Nontender to palpation, normoactive bowel sounds, no rebound/guarding; No hepatosplenomegaly  MUSCULOSKELETAL:  Normal gait; no clubbing or cyanosis of digits; no joint swelling or tenderness to palpation  PSYCH: A+O to person, place, and time; affect appropriate  NEUROLOGY: CN 2-12 are intact and symmetric; no gross sensory deficits   SKIN: No rashes; no palpable lesions    LABS:                        10.4   8.05  )-----------( 393      ( 28 Mar 2022 06:29 )             31.6     03-28    136  |  102  |  10  ----------------------------<  102<H>  3.9   |  26  |  0.77    Ca    9.0      28 Mar 2022 06:29  Phos  3.6     03-28  Mg     2.10     03-28                RADIOLOGY & ADDITIONAL TESTS:    Imaging Personally Reviewed:    Consultant(s) Notes Reviewed:      Care Discussed with Consultants/Other Providers: RN, SW, CM, ACP re overall care  Patient is a 56y old  Male who presents with a chief complaint of Rectal pain and bleeding, difficulty ambulating (27 Mar 2022 13:24)      SUBJECTIVE / OVERNIGHT EVENTS:  Restarted Zosyn over weekend.  Seen earlier, c/o very painful rectal area, radiates into groin. No BM 3 days, very worried about painful BM.   No chest pain, SOB, nausea, vomiting, dysuria. Tolerating PO.    MEDICATIONS  (STANDING):  atorvastatin 10 milliGRAM(s) Oral at bedtime  bictegravir 50 mG/emtricitabine 200 mG/tenofovir alafenamide 25 mG (BIKTARVY) 1 Tablet(s) Oral daily  chlorhexidine 2% Cloths 1 Application(s) Topical daily  gabapentin 300 milliGRAM(s) Oral <User Schedule>  metoprolol succinate ER 50 milliGRAM(s) Oral daily  morphine ER Tablet 90 milliGRAM(s) Oral every 8 hours  OLANZapine 10 milliGRAM(s) Oral at bedtime  piperacillin/tazobactam IVPB.. 3.375 Gram(s) IV Intermittent every 8 hours  polyethylene glycol 3350 17 Gram(s) Oral two times a day  psyllium Powder 1 Packet(s) Oral two times a day  senna 2 Tablet(s) Oral two times a day    MEDICATIONS  (PRN):  acetaminophen     Tablet .. 650 milliGRAM(s) Oral every 6 hours PRN Temp greater or equal to 38C (100.4F), Mild Pain (1 - 3)  ALPRAZolam 1 milliGRAM(s) Oral daily PRN anxiety  HYDROmorphone  Injectable 1 milliGRAM(s) IV Push every 3 hours PRN Moderate Pain (4 - 6)  HYDROmorphone  Injectable 3 milliGRAM(s) IV Push every 3 hours PRN Severe Pain (7 - 10)  melatonin 3 milliGRAM(s) Oral at bedtime PRN Insomnia  zolpidem 5 milliGRAM(s) Oral at bedtime PRN Insomnia      CAPILLARY BLOOD GLUCOSE          I&O's Summary      Vital Signs Last 24 Hrs  T(C): 37.4 (28 Mar 2022 05:14), Max: 37.4 (28 Mar 2022 05:14)  T(F): 99.3 (28 Mar 2022 05:14), Max: 99.3 (28 Mar 2022 05:14)  HR: 107 (28 Mar 2022 05:14) (98 - 107)  BP: 126/95 (28 Mar 2022 06:36) (117/71 - 153/98)  BP(mean): --  RR: 18 (28 Mar 2022 05:14) (17 - 18)  SpO2: 100% (28 Mar 2022 05:14) (97% - 100%)    CONSTITUTIONAL: lying on side in bed, resting  RESPIRATORY: Normal respiratory effort; lungs are clear to auscultation bilaterally  CARDIOVASCULAR: low grade tachycardia, normal S1 and S2, no murmur/rub/gallop; No lower extremity edema; Peripheral pulses are 2+ bilaterally  ABDOMEN: Nontender to palpation, normoactive bowel sounds, no rebound/guarding  MUSCULOSKELETAL:  no clubbing or cyanosis of digits; no joint swelling or tenderness to palpation  PSYCH: A+O to person, place, and time; affect appropriate  NEUROLOGY: CN 2-12 are intact and symmetric; no gross sensory deficits   SKIN: No rashes; no palpable lesions    LABS:                        10.4   8.05  )-----------( 393      ( 28 Mar 2022 06:29 )             31.6     03-28    136  |  102  |  10  ----------------------------<  102<H>  3.9   |  26  |  0.77    Ca    9.0      28 Mar 2022 06:29  Phos  3.6     03-28  Mg     2.10     03-28                RADIOLOGY & ADDITIONAL TESTS:    Imaging Personally Reviewed:    Consultant(s) Notes Reviewed:      Care Discussed with Consultants/Other Providers: RN, SW, CM, ACP re overall care

## 2022-03-28 NOTE — PROGRESS NOTE ADULT - ASSESSMENT
56M rectal cancer s/p radiation, prostate cancer s/p radiation, HIV on biktarvy, diverticulosis w/ h/o diverticulitis, anxiety, depression, HTN, chronic constipation presents with uncontrollable rectal pain, bleeding, subjective fevers, chills, diaphoresis, and difficulty ambulating.

## 2022-03-28 NOTE — PROGRESS NOTE ADULT - PROBLEM SELECTOR PLAN 4
-likely AOCD from malignancy vs KAREN, however patient does have blood in toilet after bowel movements and blood on toilet paper  -Trend Hb, transfuse for <7 -acute on chronic gait instability over the past 3 days  -neuro exam benign, 5/5 in UE b/l, 5/5 strength in RLE, and 4/5 strength in LLE which he states is his baseline  -usually ambulates without assistance, now is using cane  -more likely 2/2 anal pain radiating down legs, less likely due to other causes such as new mets  - PT rec home PT

## 2022-03-28 NOTE — PROGRESS NOTE ADULT - PROBLEM SELECTOR PLAN 3
-acute on chronic gait instability over the past 3 days  -neuro exam benign, 5/5 in UE b/l, 5/5 strength in RLE, and 4/5 strength in LLE which he states is his baseline  -usually ambulates without assistance, now is using cane  -more likely 2/2 anal pain radiating down legs, less likely due to other causes such as new mets  - PT rec home PT given HIV and possible fistula was continued on empiric antibiotics, completed 7d course of zosyn on 3/25, Final bcx 3/17 neg  - Had fever on night of 3/25, Bcx ordered, neg thus far   - Pt recently completed 7d course of zosyn on 3/25 but zosyn reordered 3/26 for low grade temp --> d/w ID continue for now, may still proceed with surgery as planned Wed as long as remains stable

## 2022-03-28 NOTE — PROGRESS NOTE ADULT - PROBLEM SELECTOR PLAN 5
PMH anal cancer (dx 4/2021) s/p radiation and chemo (last 8/2021) complicated by an ulcer  > Pt completed radiation and chemo (8/2021) at [facility].  > f/u path results from rectal ulcer biopsy

## 2022-03-28 NOTE — PROGRESS NOTE ADULT - PROBLEM SELECTOR PLAN 9
BP saft on arrival, currently stable   Continue metoprolol 50 given tachycardia  Hold HCTZ and losartan. continue home zolpidem 10mg PRN

## 2022-03-28 NOTE — PROGRESS NOTE ADULT - ASSESSMENT
56 m with HTN< HIV on Biktarvy, last VL:UD and CD4>900, prostate ca 2016 s/p radiation, anal cancer (dx 4/2021) s/p radiation and chemotherapy (last 8/2021) complicated by an ulcer,  p/w rectal bleeding and pain, also b/l inguinal pain  afebrile, tmax: 100 but tachy, WBC normal  CT: Limited evaluation for residual sinus tract. A small droplet of gas is noted in the left intersphincteric space with associated edema, similar in appearance to prior CT dated 1/18/2022. There is an appearance concerning for residual tract  blood and urine cx negative  CD4 now 239    HIV on Biktarvy, VL UD, CD4 now 239 (was >900 before)  anal Ca s/p chemo and RT with rectal ulcer, now with rectal pain and bleeding, CT with unchanged gas and edema in the L intersphincteric space, ?residual tract, MRI showed a 2.9 cm perianal abscess, no fistula  b/l inguinal pain, unclear etiology, LS spine MRI with disc bulges with L4-L5, mild to mod spinal stenosis      Overall HIV, perirectal abscess,   pt went for drainage of perirectal abscess, discussed with colorectal, they did not find abscess but a sinus tract with ulceration of the rectal area.       PLAN;   * colorectal following, plan for ostomy.   * c/w zosyn,  * c/w biktarvy  * urine gc/chl negative   * repeat blood cx in lab.       Plan discussed with Medicine and surgery team.     Deric Banerjee  Please contact through MS Teams   If no response or past 5 pm/weekend call [phone].

## 2022-03-29 ENCOUNTER — APPOINTMENT (OUTPATIENT)
Dept: HYPERBARIC MEDICINE | Facility: CLINIC | Age: 57
End: 2022-03-29

## 2022-03-29 ENCOUNTER — TRANSCRIPTION ENCOUNTER (OUTPATIENT)
Age: 57
End: 2022-03-29

## 2022-03-29 DIAGNOSIS — R00.0 TACHYCARDIA, UNSPECIFIED: ICD-10-CM

## 2022-03-29 LAB
ANION GAP SERPL CALC-SCNC: 11 MMOL/L — SIGNIFICANT CHANGE UP (ref 7–14)
BASOPHILS # BLD AUTO: 0.03 K/UL — SIGNIFICANT CHANGE UP (ref 0–0.2)
BASOPHILS NFR BLD AUTO: 0.4 % — SIGNIFICANT CHANGE UP (ref 0–2)
BLD GP AB SCN SERPL QL: NEGATIVE — SIGNIFICANT CHANGE UP
BUN SERPL-MCNC: 8 MG/DL — SIGNIFICANT CHANGE UP (ref 7–23)
CALCIUM SERPL-MCNC: 8.8 MG/DL — SIGNIFICANT CHANGE UP (ref 8.4–10.5)
CHLORIDE SERPL-SCNC: 101 MMOL/L — SIGNIFICANT CHANGE UP (ref 98–107)
CO2 SERPL-SCNC: 26 MMOL/L — SIGNIFICANT CHANGE UP (ref 22–31)
CREAT SERPL-MCNC: 0.76 MG/DL — SIGNIFICANT CHANGE UP (ref 0.5–1.3)
EGFR: 106 ML/MIN/1.73M2 — SIGNIFICANT CHANGE UP
EOSINOPHIL # BLD AUTO: 0.04 K/UL — SIGNIFICANT CHANGE UP (ref 0–0.5)
EOSINOPHIL NFR BLD AUTO: 0.5 % — SIGNIFICANT CHANGE UP (ref 0–6)
GLUCOSE SERPL-MCNC: 98 MG/DL — SIGNIFICANT CHANGE UP (ref 70–99)
HCT VFR BLD CALC: 32.2 % — LOW (ref 39–50)
HGB BLD-MCNC: 10.2 G/DL — LOW (ref 13–17)
IANC: 4.92 K/UL — SIGNIFICANT CHANGE UP (ref 1.8–7.4)
IMM GRANULOCYTES NFR BLD AUTO: 0.5 % — SIGNIFICANT CHANGE UP (ref 0–1.5)
LYMPHOCYTES # BLD AUTO: 1.57 K/UL — SIGNIFICANT CHANGE UP (ref 1–3.3)
LYMPHOCYTES # BLD AUTO: 20.7 % — SIGNIFICANT CHANGE UP (ref 13–44)
MAGNESIUM SERPL-MCNC: 2.1 MG/DL — SIGNIFICANT CHANGE UP (ref 1.6–2.6)
MCHC RBC-ENTMCNC: 27.7 PG — SIGNIFICANT CHANGE UP (ref 27–34)
MCHC RBC-ENTMCNC: 31.7 GM/DL — LOW (ref 32–36)
MCV RBC AUTO: 87.5 FL — SIGNIFICANT CHANGE UP (ref 80–100)
MONOCYTES # BLD AUTO: 0.98 K/UL — HIGH (ref 0–0.9)
MONOCYTES NFR BLD AUTO: 12.9 % — SIGNIFICANT CHANGE UP (ref 2–14)
NEUTROPHILS # BLD AUTO: 4.92 K/UL — SIGNIFICANT CHANGE UP (ref 1.8–7.4)
NEUTROPHILS NFR BLD AUTO: 65 % — SIGNIFICANT CHANGE UP (ref 43–77)
NRBC # BLD: 0 /100 WBCS — SIGNIFICANT CHANGE UP
NRBC # FLD: 0 K/UL — SIGNIFICANT CHANGE UP
PHOSPHATE SERPL-MCNC: 3.5 MG/DL — SIGNIFICANT CHANGE UP (ref 2.5–4.5)
PLATELET # BLD AUTO: 368 K/UL — SIGNIFICANT CHANGE UP (ref 150–400)
POTASSIUM SERPL-MCNC: 3.8 MMOL/L — SIGNIFICANT CHANGE UP (ref 3.5–5.3)
POTASSIUM SERPL-SCNC: 3.8 MMOL/L — SIGNIFICANT CHANGE UP (ref 3.5–5.3)
RBC # BLD: 3.68 M/UL — LOW (ref 4.2–5.8)
RBC # FLD: 13.6 % — SIGNIFICANT CHANGE UP (ref 10.3–14.5)
RH IG SCN BLD-IMP: NEGATIVE — SIGNIFICANT CHANGE UP
SARS-COV-2 RNA SPEC QL NAA+PROBE: SIGNIFICANT CHANGE UP
SODIUM SERPL-SCNC: 138 MMOL/L — SIGNIFICANT CHANGE UP (ref 135–145)
WBC # BLD: 7.58 K/UL — SIGNIFICANT CHANGE UP (ref 3.8–10.5)
WBC # FLD AUTO: 7.58 K/UL — SIGNIFICANT CHANGE UP (ref 3.8–10.5)

## 2022-03-29 PROCEDURE — 99232 SBSQ HOSP IP/OBS MODERATE 35: CPT

## 2022-03-29 PROCEDURE — 93970 EXTREMITY STUDY: CPT | Mod: 26

## 2022-03-29 PROCEDURE — 99232 SBSQ HOSP IP/OBS MODERATE 35: CPT | Mod: GC,57

## 2022-03-29 RX ADMIN — HYDROMORPHONE HYDROCHLORIDE 3 MILLIGRAM(S): 2 INJECTION INTRAMUSCULAR; INTRAVENOUS; SUBCUTANEOUS at 09:15

## 2022-03-29 RX ADMIN — POLYETHYLENE GLYCOL 3350 17 GRAM(S): 17 POWDER, FOR SOLUTION ORAL at 13:17

## 2022-03-29 RX ADMIN — HYDROMORPHONE HYDROCHLORIDE 3 MILLIGRAM(S): 2 INJECTION INTRAMUSCULAR; INTRAVENOUS; SUBCUTANEOUS at 08:52

## 2022-03-29 RX ADMIN — PIPERACILLIN AND TAZOBACTAM 25 GRAM(S): 4; .5 INJECTION, POWDER, LYOPHILIZED, FOR SOLUTION INTRAVENOUS at 22:20

## 2022-03-29 RX ADMIN — PIPERACILLIN AND TAZOBACTAM 25 GRAM(S): 4; .5 INJECTION, POWDER, LYOPHILIZED, FOR SOLUTION INTRAVENOUS at 05:49

## 2022-03-29 RX ADMIN — HYDROMORPHONE HYDROCHLORIDE 3 MILLIGRAM(S): 2 INJECTION INTRAMUSCULAR; INTRAVENOUS; SUBCUTANEOUS at 18:16

## 2022-03-29 RX ADMIN — HYDROMORPHONE HYDROCHLORIDE 3 MILLIGRAM(S): 2 INJECTION INTRAMUSCULAR; INTRAVENOUS; SUBCUTANEOUS at 18:33

## 2022-03-29 RX ADMIN — HYDROMORPHONE HYDROCHLORIDE 3 MILLIGRAM(S): 2 INJECTION INTRAMUSCULAR; INTRAVENOUS; SUBCUTANEOUS at 15:15

## 2022-03-29 RX ADMIN — ATORVASTATIN CALCIUM 10 MILLIGRAM(S): 80 TABLET, FILM COATED ORAL at 22:23

## 2022-03-29 RX ADMIN — MORPHINE SULFATE 90 MILLIGRAM(S): 50 CAPSULE, EXTENDED RELEASE ORAL at 13:35

## 2022-03-29 RX ADMIN — HYDROMORPHONE HYDROCHLORIDE 3 MILLIGRAM(S): 2 INJECTION INTRAMUSCULAR; INTRAVENOUS; SUBCUTANEOUS at 22:18

## 2022-03-29 RX ADMIN — Medication 50 MILLIGRAM(S): at 06:36

## 2022-03-29 RX ADMIN — HYDROMORPHONE HYDROCHLORIDE 3 MILLIGRAM(S): 2 INJECTION INTRAMUSCULAR; INTRAVENOUS; SUBCUTANEOUS at 04:21

## 2022-03-29 RX ADMIN — GABAPENTIN 300 MILLIGRAM(S): 400 CAPSULE ORAL at 09:41

## 2022-03-29 RX ADMIN — HYDROMORPHONE HYDROCHLORIDE 3 MILLIGRAM(S): 2 INJECTION INTRAMUSCULAR; INTRAVENOUS; SUBCUTANEOUS at 11:55

## 2022-03-29 RX ADMIN — OLANZAPINE 10 MILLIGRAM(S): 15 TABLET, FILM COATED ORAL at 22:20

## 2022-03-29 RX ADMIN — GABAPENTIN 300 MILLIGRAM(S): 400 CAPSULE ORAL at 12:21

## 2022-03-29 RX ADMIN — HYDROMORPHONE HYDROCHLORIDE 3 MILLIGRAM(S): 2 INJECTION INTRAMUSCULAR; INTRAVENOUS; SUBCUTANEOUS at 05:21

## 2022-03-29 RX ADMIN — CHLORHEXIDINE GLUCONATE 1 APPLICATION(S): 213 SOLUTION TOPICAL at 12:05

## 2022-03-29 RX ADMIN — GABAPENTIN 300 MILLIGRAM(S): 400 CAPSULE ORAL at 21:18

## 2022-03-29 RX ADMIN — HYDROMORPHONE HYDROCHLORIDE 3 MILLIGRAM(S): 2 INJECTION INTRAMUSCULAR; INTRAVENOUS; SUBCUTANEOUS at 02:14

## 2022-03-29 RX ADMIN — BICTEGRAVIR SODIUM, EMTRICITABINE, AND TENOFOVIR ALAFENAMIDE FUMARATE 1 TABLET(S): 30; 120; 15 TABLET ORAL at 22:20

## 2022-03-29 RX ADMIN — MORPHINE SULFATE 90 MILLIGRAM(S): 50 CAPSULE, EXTENDED RELEASE ORAL at 06:36

## 2022-03-29 RX ADMIN — MORPHINE SULFATE 90 MILLIGRAM(S): 50 CAPSULE, EXTENDED RELEASE ORAL at 22:24

## 2022-03-29 RX ADMIN — HYDROMORPHONE HYDROCHLORIDE 3 MILLIGRAM(S): 2 INJECTION INTRAMUSCULAR; INTRAVENOUS; SUBCUTANEOUS at 12:15

## 2022-03-29 RX ADMIN — SENNA PLUS 2 TABLET(S): 8.6 TABLET ORAL at 06:32

## 2022-03-29 RX ADMIN — POLYETHYLENE GLYCOL 3350 17 GRAM(S): 17 POWDER, FOR SOLUTION ORAL at 09:40

## 2022-03-29 RX ADMIN — HYDROMORPHONE HYDROCHLORIDE 3 MILLIGRAM(S): 2 INJECTION INTRAMUSCULAR; INTRAVENOUS; SUBCUTANEOUS at 01:14

## 2022-03-29 RX ADMIN — SENNA PLUS 2 TABLET(S): 8.6 TABLET ORAL at 18:20

## 2022-03-29 RX ADMIN — HYDROMORPHONE HYDROCHLORIDE 3 MILLIGRAM(S): 2 INJECTION INTRAMUSCULAR; INTRAVENOUS; SUBCUTANEOUS at 15:32

## 2022-03-29 RX ADMIN — ZOLPIDEM TARTRATE 5 MILLIGRAM(S): 10 TABLET ORAL at 22:23

## 2022-03-29 RX ADMIN — HYDROMORPHONE HYDROCHLORIDE 3 MILLIGRAM(S): 2 INJECTION INTRAMUSCULAR; INTRAVENOUS; SUBCUTANEOUS at 21:18

## 2022-03-29 RX ADMIN — PIPERACILLIN AND TAZOBACTAM 25 GRAM(S): 4; .5 INJECTION, POWDER, LYOPHILIZED, FOR SOLUTION INTRAVENOUS at 13:17

## 2022-03-29 NOTE — PROGRESS NOTE ADULT - PROBLEM SELECTOR PLAN 1
-patient with acute on chronic rectal pain associated w/ hematochezia and constipation  -pain in setting of multiple radiation treatments to the area due to h/o anal cancer and prostate cancer  -chronically on MS contin 60mg q8 and Dilaudid PO 8mg q4, which has not been adequately controlling the pain  -pain associated with yellow drainage from the area  - MRI showing perianal abscess- pt went to OR today 3/25- per OR note - severe induration seen circumferentially with ulcerated mucosa and recessed cavity on left side inferior to true lumen. No definite abscess seen therefore no drainage or sphincterotomy was performed. Surgery recommend diverting colostomy -  pt agrees  - Continue zosyn for now  - Continue IV Dilaudid prn  - Palliative consult following  for pain management  - c/w bowel regimen (home relistor now given iv) ???? acute on chronic rectal pain associated w/ hematochezia and constipation, s/p multiple radiation treatments to the area due to h/o anal cancer and prostate cancer; chronically on MS contin 60mg q8 and Dilaudid PO 8mg q4, which has not been adequately controlling the pain  - MRI perianal abscess- OR 3/25 severe induration seen circumferentially with ulcerated mucosa and recessed cavity on left side inferior to true lumen. No definite abscess seen therefore no drainage or sphincterotomy was performed. Surgery recommend diverting colostomy -  pt agrees, scheduled for 3/20  - Continue zosyn for now  - Palliative consult helping optimize pain management  - c/w bowel regimen (was on home Relistor)

## 2022-03-29 NOTE — PROGRESS NOTE ADULT - PROBLEM SELECTOR PLAN 2
no BM 3 days - increase miralax to tid, ATC lactulose until BM, c/w Senna; d/c Metamucil intermittent, most likely d/t pain  will check BLE US preop (d/w US [phone], will take down now....) --> if US negative for DVT, pt will be optimized for this important surgery

## 2022-03-29 NOTE — PROGRESS NOTE ADULT - ASSESSMENT
56 m with HTN< HIV on Biktarvy, last VL:UD and CD4>900, prostate ca 2016 s/p radiation, anal cancer (dx 4/2021) s/p radiation and chemotherapy (last 8/2021) complicated by an ulcer,  p/w rectal bleeding and pain, also b/l inguinal pain  afebrile, tmax: 100 but tachy, WBC normal  CT: Limited evaluation for residual sinus tract. A small droplet of gas is noted in the left intersphincteric space with associated edema, similar in appearance to prior CT dated 1/18/2022. There is an appearance concerning for residual tract  blood and urine cx negative  CD4 now 239    HIV on Biktarvy, VL UD, CD4 now 239 (was >900 before)  anal Ca s/p chemo and RT with rectal ulcer, now with rectal pain and bleeding, CT with unchanged gas and edema in the L intersphincteric space, ?residual tract, MRI showed a 2.9 cm perianal abscess, no fistula  b/l inguinal pain, unclear etiology, LS spine MRI with disc bulges with L4-L5, mild to mod spinal stenosis  s/p Exam under anesthesia with rectal biopsy and rigid sigmoidoscopy 3/25, Severe induration seen circumferentially with ulcerated mucosa and recessed cavity on left side inferior to true lumen. No definite abscess seen therefore no drainage or sphincterotomy was performed, but now plan for loop colostomy tomorrow      * c/w zosyn for now as there is plan for OR tomorrow, started 3/18, if no evidence of infection in OR then will discontinue antibiotics soon  * c/w biktarvy      The above assessment and plan was discussed with the primary team    Denise Gordon MD  contact on teams  After 5pm and on weekends call [phone]

## 2022-03-29 NOTE — PROGRESS NOTE ADULT - PROBLEM SELECTOR PLAN 3
given HIV and possible fistula was continued on empiric antibiotics, completed 7d course of zosyn on 3/25, Final bcx 3/17 neg  - Had fever on night of 3/25, Bcx ordered, neg thus far   - Pt recently completed 7d course of zosyn on 3/25 but zosyn reordered 3/26 for low grade temp --> d/w ID continue for now, may still proceed with surgery as planned Wed as long as remains stable +BM 3/28 - c/w miralax tid, Senna

## 2022-03-29 NOTE — PROGRESS NOTE ADULT - SUBJECTIVE AND OBJECTIVE BOX
Patient is a 56y old  Male who presents with a chief complaint of Rectal pain and bleeding, difficulty ambulating (28 Mar 2022 18:00)    SUBJECTIVE / OVERNIGHT EVENTS:  ....    MEDICATIONS  (STANDING):  atorvastatin 10 milliGRAM(s) Oral at bedtime  bictegravir 50 mG/emtricitabine 200 mG/tenofovir alafenamide 25 mG (BIKTARVY) 1 Tablet(s) Oral daily  chlorhexidine 2% Cloths 1 Application(s) Topical daily  gabapentin 300 milliGRAM(s) Oral <User Schedule>  lactulose Syrup 10 Gram(s) Oral every 6 hours  metoprolol succinate ER 50 milliGRAM(s) Oral daily  morphine ER Tablet 90 milliGRAM(s) Oral every 8 hours  OLANZapine 10 milliGRAM(s) Oral at bedtime  piperacillin/tazobactam IVPB.. 3.375 Gram(s) IV Intermittent every 8 hours  polyethylene glycol 3350 17 Gram(s) Oral <User Schedule>  senna 2 Tablet(s) Oral two times a day    MEDICATIONS  (PRN):  acetaminophen     Tablet .. 650 milliGRAM(s) Oral every 6 hours PRN Temp greater or equal to 38C (100.4F), Mild Pain (1 - 3)  ALPRAZolam 1 milliGRAM(s) Oral daily PRN anxiety  HYDROmorphone   Tablet 8 milliGRAM(s) Oral every 4 hours PRN Moderate Pain (4 - 6)  HYDROmorphone  Injectable 3 milliGRAM(s) IV Push every 3 hours PRN Severe Pain (7 - 10)  melatonin 3 milliGRAM(s) Oral at bedtime PRN Insomnia  zolpidem 5 milliGRAM(s) Oral at bedtime PRN Insomnia    Vital Signs Last 24 Hrs  T(C): 37.3 (29 Mar 2022 08:53), Max: 37.3 (28 Mar 2022 10:00)  T(F): 99.1 (29 Mar 2022 08:53), Max: 99.2 (28 Mar 2022 10:00)  HR: 104 (29 Mar 2022 08:53) (94 - 108)  BP: 129/85 (29 Mar 2022 08:53) (121/81 - 142/92)  BP(mean): --  RR: 18 (29 Mar 2022 08:53) (18 - 18)  SpO2: 97% (29 Mar 2022 08:53) (95% - 98%)    CONSTITUTIONAL: lying on side in bed, resting  RESPIRATORY: Normal respiratory effort; lungs are clear to auscultation bilaterally  CARDIOVASCULAR: low grade tachycardia, normal S1 and S2, no murmur/rub/gallop; No lower extremity edema; Peripheral pulses are 2+ bilaterally  ABDOMEN: Nontender to palpation, normoactive bowel sounds, no rebound/guarding  MUSCULOSKELETAL:  no clubbing or cyanosis of digits; no joint swelling or tenderness to palpation  PSYCH: A+O to person, place, and time; affect appropriate  NEUROLOGY: CN 2-12 are intact and symmetric; no gross sensory deficits   SKIN: No rashes; no palpable lesions    LABS:                        10.2   7.58  )-----------( 368      ( 29 Mar 2022 07:34 )             32.2     03-29    138  |  101  |  8   ----------------------------<  98  3.8   |  26  |  0.76    Ca    8.8      29 Mar 2022 07:34  Phos  3.5     03-29  Mg     2.10     03-29    RADIOLOGY & ADDITIONAL TESTS:    Imaging Personally Reviewed:    Consultant(s) Notes Reviewed:      Care Discussed with Consultants/Other Providers: RN, SW, CM, ACP re overall care  Patient is a 56y old  Male who presents with a chief complaint of Rectal pain and bleeding, difficulty ambulating (28 Mar 2022 18:00)    SUBJECTIVE / OVERNIGHT EVENTS:  In good spirits this morning. Had liquidy BMs, were painful but feels some relief. No chest pain, SOB, nausea, vomiting, dysuria. Eating/drinking normally.    MEDICATIONS  (STANDING):  atorvastatin 10 milliGRAM(s) Oral at bedtime  bictegravir 50 mG/emtricitabine 200 mG/tenofovir alafenamide 25 mG (BIKTARVY) 1 Tablet(s) Oral daily  chlorhexidine 2% Cloths 1 Application(s) Topical daily  gabapentin 300 milliGRAM(s) Oral <User Schedule>  lactulose Syrup 10 Gram(s) Oral every 6 hours  metoprolol succinate ER 50 milliGRAM(s) Oral daily  morphine ER Tablet 90 milliGRAM(s) Oral every 8 hours  OLANZapine 10 milliGRAM(s) Oral at bedtime  piperacillin/tazobactam IVPB.. 3.375 Gram(s) IV Intermittent every 8 hours  polyethylene glycol 3350 17 Gram(s) Oral <User Schedule>  senna 2 Tablet(s) Oral two times a day    MEDICATIONS  (PRN):  acetaminophen     Tablet .. 650 milliGRAM(s) Oral every 6 hours PRN Temp greater or equal to 38C (100.4F), Mild Pain (1 - 3)  ALPRAZolam 1 milliGRAM(s) Oral daily PRN anxiety  HYDROmorphone   Tablet 8 milliGRAM(s) Oral every 4 hours PRN Moderate Pain (4 - 6)  HYDROmorphone  Injectable 3 milliGRAM(s) IV Push every 3 hours PRN Severe Pain (7 - 10)  melatonin 3 milliGRAM(s) Oral at bedtime PRN Insomnia  zolpidem 5 milliGRAM(s) Oral at bedtime PRN Insomnia    Vital Signs Last 24 Hrs  T(C): 37.3 (29 Mar 2022 08:53), Max: 37.3 (28 Mar 2022 10:00)  T(F): 99.1 (29 Mar 2022 08:53), Max: 99.2 (28 Mar 2022 10:00)  HR: 104 (29 Mar 2022 08:53) (94 - 108)  BP: 129/85 (29 Mar 2022 08:53) (121/81 - 142/92)  BP(mean): --  RR: 18 (29 Mar 2022 08:53) (18 - 18)  SpO2: 97% (29 Mar 2022 08:53) (95% - 98%)    CONSTITUTIONAL: lying on side in bed, resting  RESPIRATORY: Normal respiratory effort; lungs are clear to auscultation bilaterally  CARDIOVASCULAR: low grade tachycardia, normal S1 and S2, no murmur/rub/gallop; No lower extremity edema; Peripheral pulses are 2+ bilaterally  ABDOMEN: Nontender to palpation, normoactive bowel sounds, no rebound/guarding  MUSCULOSKELETAL:  no clubbing or cyanosis of digits; no joint swelling or tenderness to palpation  PSYCH: A+O to person, place, and time; affect appropriate  NEUROLOGY: CN 2-12 are intact and symmetric; no gross sensory deficits   SKIN: No rashes; no palpable lesions    LABS:                        10.2   7.58  )-----------( 368      ( 29 Mar 2022 07:34 )             32.2     03-29    138  |  101  |  8   ----------------------------<  98  3.8   |  26  |  0.76    Ca    8.8      29 Mar 2022 07:34  Phos  3.5     03-29  Mg     2.10     03-29    RADIOLOGY & ADDITIONAL TESTS:    Imaging Personally Reviewed:    Consultant(s) Notes Reviewed:      Care Discussed with Consultants/Other Providers: RN, SW, CM, ACP re overall care

## 2022-03-29 NOTE — PROGRESS NOTE ADULT - SUBJECTIVE AND OBJECTIVE BOX
Follow Up:  HIV, rectal bleeding, ?collection    Interval History: s/p Exam under anesthesia with rectal biopsy and rigid sigmoidoscopy 3/25, Severe induration seen circumferentially with ulcerated mucosa and recessed cavity on left side inferior to true lumen. No definite abscess seen therefore no drainage or sphincterotomy was performed, but now plan for loop colostomy tomorrow    ROS:      All other systems negative    Constitutional: no fever, no chills  Cardiovascular:  no chest pain, no palpitation  Respiratory:  no SOB, no cough  GI:  no abd pain, no vomiting, improved rectal pain  urinary: no dysuria, no hematuria, no flank pain  musculoskeletal: b/l inguinal pain  skin:  no rash  neurology:  no headache, no seizure  Allergies  No Known Allergies        ANTIMICROBIALS:  bictegravir 50 mG/emtricitabine 200 mG/tenofovir alafenamide 25 mG (BIKTARVY) 1 daily  piperacillin/tazobactam IVPB.. 3.375 every 8 hours      OTHER MEDS:  acetaminophen     Tablet .. 650 milliGRAM(s) Oral every 6 hours PRN  ALPRAZolam 1 milliGRAM(s) Oral daily PRN  atorvastatin 10 milliGRAM(s) Oral at bedtime  chlorhexidine 2% Cloths 1 Application(s) Topical daily  gabapentin 300 milliGRAM(s) Oral <User Schedule>  HYDROmorphone   Tablet 8 milliGRAM(s) Oral every 4 hours PRN  HYDROmorphone  Injectable 3 milliGRAM(s) IV Push every 3 hours PRN  lactulose Syrup 10 Gram(s) Oral every 6 hours  melatonin 3 milliGRAM(s) Oral at bedtime PRN  metoprolol succinate ER 50 milliGRAM(s) Oral daily  morphine ER Tablet 90 milliGRAM(s) Oral every 8 hours  OLANZapine 10 milliGRAM(s) Oral at bedtime  polyethylene glycol 3350 17 Gram(s) Oral <User Schedule>  senna 2 Tablet(s) Oral two times a day  zolpidem 5 milliGRAM(s) Oral at bedtime PRN      Vital Signs Last 24 Hrs  T(C): 37.1 (29 Mar 2022 15:14), Max: 37.3 (29 Mar 2022 08:53)  T(F): 98.8 (29 Mar 2022 15:14), Max: 99.1 (29 Mar 2022 08:53)  HR: 105 (29 Mar 2022 15:14) (94 - 115)  BP: 140/88 (29 Mar 2022 15:14) (127/92 - 142/97)  BP(mean): --  RR: 17 (29 Mar 2022 15:14) (17 - 20)  SpO2: 97% (29 Mar 2022 15:14) (96% - 98%)    Physical Exam:  General:    NAD,  non toxic  Cardio:     regular S1, S2  Respiratory:    clear b/l,    no wheezing  abd:     soft,   BS +,   no tenderness  :   no CVAT,  no suprapubic tenderness,   no  davis  Musculoskeletal:   no joint swelling  vascular: no phlebitis  Skin:    no rash                        10.2   7.58  )-----------( 368      ( 29 Mar 2022 07:34 )             32.2       03-29    138  |  101  |  8   ----------------------------<  98  3.8   |  26  |  0.76    Ca    8.8      29 Mar 2022 07:34  Phos  3.5     03-29  Mg     2.10     03-29            MICROBIOLOGY:  v  .Blood Blood-Peripheral  03-26-22   No growth to date.  --  --      Clean Catch Clean Catch (Midstream)  03-17-22   No growth  --  --      .Blood Blood-Peripheral  03-17-22   No Growth Final  --  --      .Blood Blood-Peripheral  03-17-22   No Growth Final  --  --        HIV-1 RNA Quantitative, Viral Load Log: NOT DET. lg /mL (03-18-22 @ 12:31)          RADIOLOGY:  Images independently visualized and reviewed personally, findings as below  < from: MR Pelvis/Rectal/Anal w/wo IV Cont (03.21.22 @ 09:11) >  IMPRESSION:  A 2.9 cm left perianal abscess. No evidence of associated fistula.      < end of copied text >

## 2022-03-30 ENCOUNTER — APPOINTMENT (OUTPATIENT)
Dept: HYPERBARIC MEDICINE | Facility: CLINIC | Age: 57
End: 2022-03-30

## 2022-03-30 LAB
ANION GAP SERPL CALC-SCNC: 11 MMOL/L — SIGNIFICANT CHANGE UP (ref 7–14)
APTT BLD: 31.9 SEC — SIGNIFICANT CHANGE UP (ref 27–36.3)
BUN SERPL-MCNC: 10 MG/DL — SIGNIFICANT CHANGE UP (ref 7–23)
CALCIUM SERPL-MCNC: 9.2 MG/DL — SIGNIFICANT CHANGE UP (ref 8.4–10.5)
CHLORIDE SERPL-SCNC: 102 MMOL/L — SIGNIFICANT CHANGE UP (ref 98–107)
CO2 SERPL-SCNC: 26 MMOL/L — SIGNIFICANT CHANGE UP (ref 22–31)
CREAT SERPL-MCNC: 0.78 MG/DL — SIGNIFICANT CHANGE UP (ref 0.5–1.3)
EGFR: 105 ML/MIN/1.73M2 — SIGNIFICANT CHANGE UP
GLUCOSE SERPL-MCNC: 117 MG/DL — HIGH (ref 70–99)
HCT VFR BLD CALC: 34.3 % — LOW (ref 39–50)
HGB BLD-MCNC: 11 G/DL — LOW (ref 13–17)
INR BLD: 1.29 RATIO — HIGH (ref 0.88–1.16)
MAGNESIUM SERPL-MCNC: 2.1 MG/DL — SIGNIFICANT CHANGE UP (ref 1.6–2.6)
MCHC RBC-ENTMCNC: 27.7 PG — SIGNIFICANT CHANGE UP (ref 27–34)
MCHC RBC-ENTMCNC: 32.1 GM/DL — SIGNIFICANT CHANGE UP (ref 32–36)
MCV RBC AUTO: 86.4 FL — SIGNIFICANT CHANGE UP (ref 80–100)
NRBC # BLD: 0 /100 WBCS — SIGNIFICANT CHANGE UP
NRBC # FLD: 0 K/UL — SIGNIFICANT CHANGE UP
PHOSPHATE SERPL-MCNC: 3.7 MG/DL — SIGNIFICANT CHANGE UP (ref 2.5–4.5)
PLATELET # BLD AUTO: 387 K/UL — SIGNIFICANT CHANGE UP (ref 150–400)
POTASSIUM SERPL-MCNC: 3.9 MMOL/L — SIGNIFICANT CHANGE UP (ref 3.5–5.3)
POTASSIUM SERPL-SCNC: 3.9 MMOL/L — SIGNIFICANT CHANGE UP (ref 3.5–5.3)
PROTHROM AB SERPL-ACNC: 15 SEC — HIGH (ref 10.5–13.4)
RBC # BLD: 3.97 M/UL — LOW (ref 4.2–5.8)
RBC # FLD: 13.7 % — SIGNIFICANT CHANGE UP (ref 10.3–14.5)
SODIUM SERPL-SCNC: 139 MMOL/L — SIGNIFICANT CHANGE UP (ref 135–145)
WBC # BLD: 9.23 K/UL — SIGNIFICANT CHANGE UP (ref 3.8–10.5)
WBC # FLD AUTO: 9.23 K/UL — SIGNIFICANT CHANGE UP (ref 3.8–10.5)

## 2022-03-30 PROCEDURE — 44320 COLOSTOMY: CPT | Mod: GC

## 2022-03-30 PROCEDURE — 99232 SBSQ HOSP IP/OBS MODERATE 35: CPT

## 2022-03-30 PROCEDURE — 99232 SBSQ HOSP IP/OBS MODERATE 35: CPT | Mod: GC

## 2022-03-30 RX ORDER — HYDROMORPHONE HYDROCHLORIDE 2 MG/ML
1 INJECTION INTRAMUSCULAR; INTRAVENOUS; SUBCUTANEOUS
Refills: 0 | Status: DISCONTINUED | OUTPATIENT
Start: 2022-03-30 | End: 2022-03-30

## 2022-03-30 RX ORDER — FENTANYL CITRATE 50 UG/ML
50 INJECTION INTRAVENOUS ONCE
Refills: 0 | Status: DISCONTINUED | OUTPATIENT
Start: 2022-03-30 | End: 2022-03-30

## 2022-03-30 RX ORDER — METOPROLOL TARTRATE 50 MG
50 TABLET ORAL DAILY
Refills: 0 | Status: DISCONTINUED | OUTPATIENT
Start: 2022-03-30 | End: 2022-04-02

## 2022-03-30 RX ORDER — HYDROMORPHONE HYDROCHLORIDE 2 MG/ML
2 INJECTION INTRAMUSCULAR; INTRAVENOUS; SUBCUTANEOUS ONCE
Refills: 0 | Status: DISCONTINUED | OUTPATIENT
Start: 2022-03-30 | End: 2022-03-30

## 2022-03-30 RX ORDER — LACTULOSE 10 G/15ML
10 SOLUTION ORAL EVERY 6 HOURS
Refills: 0 | Status: DISCONTINUED | OUTPATIENT
Start: 2022-03-30 | End: 2022-03-31

## 2022-03-30 RX ORDER — SENNA PLUS 8.6 MG/1
2 TABLET ORAL
Refills: 0 | Status: DISCONTINUED | OUTPATIENT
Start: 2022-03-30 | End: 2022-03-31

## 2022-03-30 RX ORDER — GABAPENTIN 400 MG/1
300 CAPSULE ORAL
Refills: 0 | Status: DISCONTINUED | OUTPATIENT
Start: 2022-03-30 | End: 2022-04-02

## 2022-03-30 RX ORDER — MORPHINE SULFATE 50 MG/1
90 CAPSULE, EXTENDED RELEASE ORAL ONCE
Refills: 0 | Status: DISCONTINUED | OUTPATIENT
Start: 2022-03-30 | End: 2022-03-30

## 2022-03-30 RX ORDER — HYDROMORPHONE HYDROCHLORIDE 2 MG/ML
8 INJECTION INTRAMUSCULAR; INTRAVENOUS; SUBCUTANEOUS EVERY 4 HOURS
Refills: 0 | Status: DISCONTINUED | OUTPATIENT
Start: 2022-03-30 | End: 2022-03-31

## 2022-03-30 RX ORDER — PIPERACILLIN AND TAZOBACTAM 4; .5 G/20ML; G/20ML
3.38 INJECTION, POWDER, LYOPHILIZED, FOR SOLUTION INTRAVENOUS EVERY 8 HOURS
Refills: 0 | Status: DISCONTINUED | OUTPATIENT
Start: 2022-03-30 | End: 2022-03-31

## 2022-03-30 RX ORDER — HYDROMORPHONE HYDROCHLORIDE 2 MG/ML
0.5 INJECTION INTRAMUSCULAR; INTRAVENOUS; SUBCUTANEOUS
Refills: 0 | Status: DISCONTINUED | OUTPATIENT
Start: 2022-03-30 | End: 2022-03-30

## 2022-03-30 RX ORDER — OLANZAPINE 15 MG/1
10 TABLET, FILM COATED ORAL AT BEDTIME
Refills: 0 | Status: DISCONTINUED | OUTPATIENT
Start: 2022-03-30 | End: 2022-04-02

## 2022-03-30 RX ORDER — POLYETHYLENE GLYCOL 3350 17 G/17G
17 POWDER, FOR SOLUTION ORAL
Refills: 0 | Status: DISCONTINUED | OUTPATIENT
Start: 2022-03-30 | End: 2022-03-31

## 2022-03-30 RX ORDER — LANOLIN ALCOHOL/MO/W.PET/CERES
3 CREAM (GRAM) TOPICAL AT BEDTIME
Refills: 0 | Status: DISCONTINUED | OUTPATIENT
Start: 2022-03-30 | End: 2022-04-02

## 2022-03-30 RX ORDER — ZOLPIDEM TARTRATE 10 MG/1
5 TABLET ORAL AT BEDTIME
Refills: 0 | Status: DISCONTINUED | OUTPATIENT
Start: 2022-03-30 | End: 2022-03-31

## 2022-03-30 RX ORDER — ONDANSETRON 8 MG/1
4 TABLET, FILM COATED ORAL ONCE
Refills: 0 | Status: DISCONTINUED | OUTPATIENT
Start: 2022-03-30 | End: 2022-03-30

## 2022-03-30 RX ORDER — MORPHINE SULFATE 50 MG/1
90 CAPSULE, EXTENDED RELEASE ORAL EVERY 8 HOURS
Refills: 0 | Status: DISCONTINUED | OUTPATIENT
Start: 2022-03-30 | End: 2022-03-31

## 2022-03-30 RX ORDER — BICTEGRAVIR SODIUM, EMTRICITABINE, AND TENOFOVIR ALAFENAMIDE FUMARATE 30; 120; 15 MG/1; MG/1; MG/1
1 TABLET ORAL DAILY
Refills: 0 | Status: DISCONTINUED | OUTPATIENT
Start: 2022-03-30 | End: 2022-04-02

## 2022-03-30 RX ORDER — ALPRAZOLAM 0.25 MG
1 TABLET ORAL DAILY
Refills: 0 | Status: DISCONTINUED | OUTPATIENT
Start: 2022-03-30 | End: 2022-03-31

## 2022-03-30 RX ORDER — HYDROMORPHONE HYDROCHLORIDE 2 MG/ML
3 INJECTION INTRAMUSCULAR; INTRAVENOUS; SUBCUTANEOUS
Refills: 0 | Status: DISCONTINUED | OUTPATIENT
Start: 2022-03-30 | End: 2022-03-31

## 2022-03-30 RX ORDER — ONDANSETRON 8 MG/1
4 TABLET, FILM COATED ORAL ONCE
Refills: 0 | Status: COMPLETED | OUTPATIENT
Start: 2022-03-30 | End: 2022-03-30

## 2022-03-30 RX ORDER — ATORVASTATIN CALCIUM 80 MG/1
10 TABLET, FILM COATED ORAL AT BEDTIME
Refills: 0 | Status: DISCONTINUED | OUTPATIENT
Start: 2022-03-30 | End: 2022-04-02

## 2022-03-30 RX ORDER — ACETAMINOPHEN 500 MG
650 TABLET ORAL EVERY 6 HOURS
Refills: 0 | Status: DISCONTINUED | OUTPATIENT
Start: 2022-03-30 | End: 2022-04-02

## 2022-03-30 RX ADMIN — HYDROMORPHONE HYDROCHLORIDE 2 MILLIGRAM(S): 2 INJECTION INTRAMUSCULAR; INTRAVENOUS; SUBCUTANEOUS at 17:45

## 2022-03-30 RX ADMIN — ATORVASTATIN CALCIUM 10 MILLIGRAM(S): 80 TABLET, FILM COATED ORAL at 22:44

## 2022-03-30 RX ADMIN — Medication 50 MILLIGRAM(S): at 06:23

## 2022-03-30 RX ADMIN — HYDROMORPHONE HYDROCHLORIDE 3 MILLIGRAM(S): 2 INJECTION INTRAMUSCULAR; INTRAVENOUS; SUBCUTANEOUS at 12:59

## 2022-03-30 RX ADMIN — MORPHINE SULFATE 90 MILLIGRAM(S): 50 CAPSULE, EXTENDED RELEASE ORAL at 06:23

## 2022-03-30 RX ADMIN — FENTANYL CITRATE 50 MICROGRAM(S): 50 INJECTION INTRAVENOUS at 17:45

## 2022-03-30 RX ADMIN — HYDROMORPHONE HYDROCHLORIDE 3 MILLIGRAM(S): 2 INJECTION INTRAMUSCULAR; INTRAVENOUS; SUBCUTANEOUS at 01:43

## 2022-03-30 RX ADMIN — HYDROMORPHONE HYDROCHLORIDE 3 MILLIGRAM(S): 2 INJECTION INTRAMUSCULAR; INTRAVENOUS; SUBCUTANEOUS at 13:20

## 2022-03-30 RX ADMIN — HYDROMORPHONE HYDROCHLORIDE 3 MILLIGRAM(S): 2 INJECTION INTRAMUSCULAR; INTRAVENOUS; SUBCUTANEOUS at 04:49

## 2022-03-30 RX ADMIN — HYDROMORPHONE HYDROCHLORIDE 3 MILLIGRAM(S): 2 INJECTION INTRAMUSCULAR; INTRAVENOUS; SUBCUTANEOUS at 21:34

## 2022-03-30 RX ADMIN — HYDROMORPHONE HYDROCHLORIDE 3 MILLIGRAM(S): 2 INJECTION INTRAMUSCULAR; INTRAVENOUS; SUBCUTANEOUS at 03:49

## 2022-03-30 RX ADMIN — HYDROMORPHONE HYDROCHLORIDE 3 MILLIGRAM(S): 2 INJECTION INTRAMUSCULAR; INTRAVENOUS; SUBCUTANEOUS at 08:49

## 2022-03-30 RX ADMIN — Medication 50 MILLIGRAM(S): at 18:55

## 2022-03-30 RX ADMIN — PIPERACILLIN AND TAZOBACTAM 25 GRAM(S): 4; .5 INJECTION, POWDER, LYOPHILIZED, FOR SOLUTION INTRAVENOUS at 06:04

## 2022-03-30 RX ADMIN — HYDROMORPHONE HYDROCHLORIDE 2 MILLIGRAM(S): 2 INJECTION INTRAMUSCULAR; INTRAVENOUS; SUBCUTANEOUS at 18:00

## 2022-03-30 RX ADMIN — GABAPENTIN 300 MILLIGRAM(S): 400 CAPSULE ORAL at 21:33

## 2022-03-30 RX ADMIN — PIPERACILLIN AND TAZOBACTAM 25 GRAM(S): 4; .5 INJECTION, POWDER, LYOPHILIZED, FOR SOLUTION INTRAVENOUS at 22:58

## 2022-03-30 RX ADMIN — ONDANSETRON 4 MILLIGRAM(S): 8 TABLET, FILM COATED ORAL at 21:55

## 2022-03-30 RX ADMIN — BICTEGRAVIR SODIUM, EMTRICITABINE, AND TENOFOVIR ALAFENAMIDE FUMARATE 1 TABLET(S): 30; 120; 15 TABLET ORAL at 22:44

## 2022-03-30 RX ADMIN — HYDROMORPHONE HYDROCHLORIDE 3 MILLIGRAM(S): 2 INJECTION INTRAMUSCULAR; INTRAVENOUS; SUBCUTANEOUS at 09:15

## 2022-03-30 RX ADMIN — HYDROMORPHONE HYDROCHLORIDE 1 MILLIGRAM(S): 2 INJECTION INTRAMUSCULAR; INTRAVENOUS; SUBCUTANEOUS at 17:15

## 2022-03-30 RX ADMIN — HYDROMORPHONE HYDROCHLORIDE 3 MILLIGRAM(S): 2 INJECTION INTRAMUSCULAR; INTRAVENOUS; SUBCUTANEOUS at 22:34

## 2022-03-30 RX ADMIN — FENTANYL CITRATE 50 MICROGRAM(S): 50 INJECTION INTRAVENOUS at 17:30

## 2022-03-30 RX ADMIN — PIPERACILLIN AND TAZOBACTAM 25 GRAM(S): 4; .5 INJECTION, POWDER, LYOPHILIZED, FOR SOLUTION INTRAVENOUS at 13:09

## 2022-03-30 RX ADMIN — HYDROMORPHONE HYDROCHLORIDE 3 MILLIGRAM(S): 2 INJECTION INTRAMUSCULAR; INTRAVENOUS; SUBCUTANEOUS at 00:43

## 2022-03-30 RX ADMIN — HYDROMORPHONE HYDROCHLORIDE 1 MILLIGRAM(S): 2 INJECTION INTRAMUSCULAR; INTRAVENOUS; SUBCUTANEOUS at 17:00

## 2022-03-30 RX ADMIN — MORPHINE SULFATE 90 MILLIGRAM(S): 50 CAPSULE, EXTENDED RELEASE ORAL at 17:48

## 2022-03-30 RX ADMIN — ZOLPIDEM TARTRATE 5 MILLIGRAM(S): 10 TABLET ORAL at 22:57

## 2022-03-30 RX ADMIN — HYDROMORPHONE HYDROCHLORIDE 1 MILLIGRAM(S): 2 INJECTION INTRAMUSCULAR; INTRAVENOUS; SUBCUTANEOUS at 16:58

## 2022-03-30 RX ADMIN — CHLORHEXIDINE GLUCONATE 1 APPLICATION(S): 213 SOLUTION TOPICAL at 12:59

## 2022-03-30 RX ADMIN — OLANZAPINE 10 MILLIGRAM(S): 15 TABLET, FILM COATED ORAL at 22:44

## 2022-03-30 RX ADMIN — HYDROMORPHONE HYDROCHLORIDE 1 MILLIGRAM(S): 2 INJECTION INTRAMUSCULAR; INTRAVENOUS; SUBCUTANEOUS at 16:44

## 2022-03-30 NOTE — ADVANCED PRACTICE NURSE CONSULT - ASSESSMENT
Patient AxOX4, overview of surgery with need for colostomy reviewed, stoma and pouch samples shown and discussed. Frequency of pouch change and emptying reviewed. Educational resources provided. Questions answered.     Abdomen: Round with folds by the belt line, no scar tissue noted. Patient with rectal pain, difficulty sitting or having bed elevated if on back.   Stoma nery placed to LUQ and LLQ away from creases and folds, within rectus muscle, away from belt line, within patients visual field.     Patient AxOX4, overview of surgery with need for colostomy reviewed, stoma and pouch samples shown and discussed. Frequency of pouch change and emptying reviewed. Educational resources provided. Questions answered. Consent signed in chart.    Abdomen: Round with folds by the belt line, no scar tissue noted. Patient with rectal pain, difficulty sitting or having bed elevated if on back.   Stoma nery placed to LUQ and LLQ away from creases and folds, within rectus muscle, away from belt line, within patients visual field.

## 2022-03-30 NOTE — PROGRESS NOTE ADULT - PROBLEM SELECTOR PLAN 4
given HIV and possible fistula was continued on empiric antibiotics, completed 7d course of zosyn on 3/25, Final bcx 3/17 neg  - Had fever on night of 3/25, Bcx ordered, neg thus far   - Pt recently completed 7d course of zosyn on 3/25 but zosyn reordered 3/26 for low grade temp --> d/w ID continue for now, may still proceed with surgery as planned Wed as long as remains stable given HIV and possible fistula was continued on empiric antibiotics, completed 7d course of zosyn on 3/25, Final bcx 3/17 neg  - Had fever on night of 3/25, Bcx ordered, neg thus far   - Pt recently completed 7d course of zosyn on 3/25 but zosyn reordered 3/26 for low grade temp --> appreciate ID f/u stable on zosyn for OR

## 2022-03-30 NOTE — PROGRESS NOTE ADULT - PROBLEM SELECTOR PLAN 1
acute on chronic rectal pain associated w/ hematochezia and constipation, s/p multiple radiation treatments to the area due to h/o anal cancer and prostate cancer; chronically on MS contin 60mg q8 and Dilaudid PO 8mg q4, which has not been adequately controlling the pain  - MRI perianal abscess- OR 3/25 severe induration seen circumferentially with ulcerated mucosa and recessed cavity on left side inferior to true lumen. No definite abscess seen therefore no drainage or sphincterotomy was performed. Surgery recommend diverting colostomy -  pt agrees, scheduled for 3/20  - Continue zosyn for now  - Palliative consult helping optimize pain management  - c/w bowel regimen (was on home Relistor) acute on chronic rectal pain associated w/ hematochezia and constipation, s/p multiple radiation treatments to the area due to h/o anal cancer and prostate cancer; chronically on MS contin 60mg q8 and Dilaudid PO 8mg q4, which has not been adequately controlling the pain  - MRI perianal abscess- OR 3/25 severe induration seen circumferentially with ulcerated mucosa and recessed cavity on left side inferior to true lumen. No definite abscess seen therefore no drainage or sphincterotomy was performed. Surgery recommend diverting colostomy -  pt agrees, scheduled for 3/30 - pt optimized for procedure  - Continue zosyn for now  - Palliative consult helping optimize pain management  - c/w bowel regimen (was on home Relistor)

## 2022-03-30 NOTE — PROGRESS NOTE ADULT - SUBJECTIVE AND OBJECTIVE BOX
Follow Up:  HIV, rectal bleeding, ?collection    Interval History: pt afebrile and plan for OR today    ROS:      All other systems negative    Constitutional: no fever, no chills  Cardiovascular:  no chest pain, no palpitation  Respiratory:  no SOB, no cough  GI:  no abd pain, no vomiting, improved rectal pain  urinary: no dysuria, no hematuria, no flank pain  musculoskeletal: b/l inguinal pain  skin:  no rash  neurology:  no headache, no seizure      Allergies  No Known Allergies        ANTIMICROBIALS:  bictegravir 50 mG/emtricitabine 200 mG/tenofovir alafenamide 25 mG (BIKTARVY) 1 daily  piperacillin/tazobactam IVPB.. 3.375 every 8 hours      OTHER MEDS:  acetaminophen     Tablet .. 650 milliGRAM(s) Oral every 6 hours PRN  ALPRAZolam 1 milliGRAM(s) Oral daily PRN  atorvastatin 10 milliGRAM(s) Oral at bedtime  chlorhexidine 2% Cloths 1 Application(s) Topical daily  gabapentin 300 milliGRAM(s) Oral <User Schedule>  HYDROmorphone   Tablet 8 milliGRAM(s) Oral every 4 hours PRN  HYDROmorphone  Injectable 0.5 milliGRAM(s) IV Push every 10 minutes PRN  HYDROmorphone  Injectable 1 milliGRAM(s) IV Push every 10 minutes PRN  HYDROmorphone  Injectable 3 milliGRAM(s) IV Push every 3 hours PRN  lactulose Syrup 10 Gram(s) Oral every 6 hours  melatonin 3 milliGRAM(s) Oral at bedtime PRN  metoprolol succinate ER 50 milliGRAM(s) Oral daily  morphine ER Tablet 90 milliGRAM(s) Oral every 8 hours  OLANZapine 10 milliGRAM(s) Oral at bedtime  ondansetron Injectable 4 milliGRAM(s) IV Push once PRN  polyethylene glycol 3350 17 Gram(s) Oral <User Schedule>  senna 2 Tablet(s) Oral two times a day  zolpidem 5 milliGRAM(s) Oral at bedtime PRN      Vital Signs Last 24 Hrs  T(C): 37.2 (30 Mar 2022 13:41), Max: 37.8 (29 Mar 2022 22:02)  T(F): 98.9 (30 Mar 2022 13:41), Max: 100 (29 Mar 2022 22:02)  HR: 106 (30 Mar 2022 13:45) (96 - 112)  BP: 132/88 (30 Mar 2022 13:45) (131/92 - 186/91)  BP(mean): --  RR: 16 (30 Mar 2022 13:45) (16 - 19)  SpO2: 97% (30 Mar 2022 13:45) (96% - 98%)    Physical Exam:  General:    NAD,  non toxic  Cardio:     regular S1, S2  Respiratory:    clear b/l,    no wheezing  abd:     soft,   BS +,   no tenderness  :   no CVAT,  no suprapubic tenderness,   no  davis  Musculoskeletal:   no joint swelling  vascular: no phlebitis  Skin:    no rash                          11.0   9.23  )-----------( 387      ( 30 Mar 2022 03:52 )             34.3       03-30    139  |  102  |  10  ----------------------------<  117<H>  3.9   |  26  |  0.78    Ca    9.2      30 Mar 2022 03:52  Phos  3.7     03-30  Mg     2.10     03-30            MICROBIOLOGY:  v  .Blood Blood-Peripheral  03-26-22   No growth to date.  --  --      Clean Catch Clean Catch (Midstream)  03-17-22   No growth  --  --      .Blood Blood-Peripheral  03-17-22   No Growth Final  --  --      .Blood Blood-Peripheral  03-17-22   No Growth Final  --  --        HIV-1 RNA Quantitative, Viral Load Log: NOT DET. lg /mL (03-18-22 @ 12:31)          RADIOLOGY:  Images independently visualized and reviewed personally, findings as below  < from: US Duplex Venous Lower Ext Complete, Bilateral (03.29.22 @ 17:51) >  IMPRESSION:  No evidence of deep venous thrombosis in either lower extremity.    < end of copied text >  < from: MR Pelvis/Rectal/Anal w/wo IV Cont (03.21.22 @ 09:11) >  IMPRESSION:  A 2.9 cm left perianal abscess. No evidence of associated fistula.      < end of copied text >

## 2022-03-30 NOTE — BRIEF OPERATIVE NOTE - NSICDXBRIEFPROCEDURE_GEN_ALL_CORE_FT
PROCEDURES:  Laparoscopic colostomy 30-Mar-2022 16:03:32  Irving De La Fuente  
PROCEDURES:  Exam under anesthesia, anus 25-Mar-2022 09:15:00  Job Santamaria  Sigmoidoscopy 25-Mar-2022 09:15:11  Job Santamaria  Rectal biopsy 25-Mar-2022 09:15:19  Job Santamaria

## 2022-03-30 NOTE — PROGRESS NOTE ADULT - SUBJECTIVE AND OBJECTIVE BOX
Patient is a 56y old  Male who presents with a chief complaint of Rectal pain and bleeding, difficulty ambulating (30 Mar 2022 07:37)    SUBJECTIVE / OVERNIGHT EVENTS:  ...    MEDICATIONS  (STANDING):  atorvastatin 10 milliGRAM(s) Oral at bedtime  bictegravir 50 mG/emtricitabine 200 mG/tenofovir alafenamide 25 mG (BIKTARVY) 1 Tablet(s) Oral daily  chlorhexidine 2% Cloths 1 Application(s) Topical daily  gabapentin 300 milliGRAM(s) Oral <User Schedule>  lactulose Syrup 10 Gram(s) Oral every 6 hours  metoprolol succinate ER 50 milliGRAM(s) Oral daily  morphine ER Tablet 90 milliGRAM(s) Oral every 8 hours  OLANZapine 10 milliGRAM(s) Oral at bedtime  piperacillin/tazobactam IVPB.. 3.375 Gram(s) IV Intermittent every 8 hours  polyethylene glycol 3350 17 Gram(s) Oral <User Schedule>  senna 2 Tablet(s) Oral two times a day    MEDICATIONS  (PRN):  acetaminophen     Tablet .. 650 milliGRAM(s) Oral every 6 hours PRN Temp greater or equal to 38C (100.4F), Mild Pain (1 - 3)  ALPRAZolam 1 milliGRAM(s) Oral daily PRN anxiety  HYDROmorphone   Tablet 8 milliGRAM(s) Oral every 4 hours PRN Moderate Pain (4 - 6)  HYDROmorphone  Injectable 3 milliGRAM(s) IV Push every 3 hours PRN Severe Pain (7 - 10)  melatonin 3 milliGRAM(s) Oral at bedtime PRN Insomnia  zolpidem 5 milliGRAM(s) Oral at bedtime PRN Insomnia    I&O's Summary    29 Mar 2022 07:01  -  30 Mar 2022 07:00  --------------------------------------------------------  IN: 920 mL / OUT: 625 mL / NET: 295 mL    Vital Signs Last 24 Hrs  T(C): 37.3 (30 Mar 2022 06:02), Max: 37.8 (29 Mar 2022 22:02)  T(F): 99.2 (30 Mar 2022 06:02), Max: 100 (29 Mar 2022 22:02)  HR: 107 (30 Mar 2022 06:02) (96 - 115)  BP: 131/92 (30 Mar 2022 06:02) (131/92 - 142/97)  BP(mean): --  RR: 17 (30 Mar 2022 06:02) (17 - 20)  SpO2: 98% (30 Mar 2022 06:02) (96% - 98%)    CONSTITUTIONAL: lying on side in bed, resting  RESPIRATORY: Normal respiratory effort; lungs are clear to auscultation bilaterally  CARDIOVASCULAR: low grade tachycardia, normal S1 and S2, no murmur/rub/gallop; No lower extremity edema; Peripheral pulses are 2+ bilaterally  ABDOMEN: Nontender to palpation, normoactive bowel sounds, no rebound/guarding  MUSCULOSKELETAL:  no clubbing or cyanosis of digits; no joint swelling or tenderness to palpation  PSYCH: A+O to person, place, and time; affect appropriate  NEUROLOGY: CN 2-12 are intact and symmetric; no gross sensory deficits   SKIN: No rashes; no palpable lesions    LABS:                        11.0   9.23  )-----------( 387      ( 30 Mar 2022 03:52 )             34.3     03-30    139  |  102  |  10  ----------------------------<  117<H>  3.9   |  26  |  0.78    Ca    9.2      30 Mar 2022 03:52  Phos  3.7     03-30  Mg     2.10     03-30    PT/INR - ( 30 Mar 2022 03:52 )   PT: 15.0 sec;   INR: 1.29 ratio    PTT - ( 30 Mar 2022 03:52 )  PTT:31.9 sec    RADIOLOGY & ADDITIONAL TESTS:    Imaging Personally Reviewed:    Consultant(s) Notes Reviewed:      Care Discussed with Consultants/Other Providers: RN, SW, CM, ACP re overall care  Patient is a 56y old  Male who presents with a chief complaint of Rectal pain and bleeding, difficulty ambulating (30 Mar 2022 07:37)    SUBJECTIVE / OVERNIGHT EVENTS:  No problems reported over night. Seen earlier, was in pain after having liquidy BM. Eager for surgery.    MEDICATIONS  (STANDING):  atorvastatin 10 milliGRAM(s) Oral at bedtime  bictegravir 50 mG/emtricitabine 200 mG/tenofovir alafenamide 25 mG (BIKTARVY) 1 Tablet(s) Oral daily  chlorhexidine 2% Cloths 1 Application(s) Topical daily  gabapentin 300 milliGRAM(s) Oral <User Schedule>  lactulose Syrup 10 Gram(s) Oral every 6 hours  metoprolol succinate ER 50 milliGRAM(s) Oral daily  morphine ER Tablet 90 milliGRAM(s) Oral every 8 hours  OLANZapine 10 milliGRAM(s) Oral at bedtime  piperacillin/tazobactam IVPB.. 3.375 Gram(s) IV Intermittent every 8 hours  polyethylene glycol 3350 17 Gram(s) Oral <User Schedule>  senna 2 Tablet(s) Oral two times a day    MEDICATIONS  (PRN):  acetaminophen     Tablet .. 650 milliGRAM(s) Oral every 6 hours PRN Temp greater or equal to 38C (100.4F), Mild Pain (1 - 3)  ALPRAZolam 1 milliGRAM(s) Oral daily PRN anxiety  HYDROmorphone   Tablet 8 milliGRAM(s) Oral every 4 hours PRN Moderate Pain (4 - 6)  HYDROmorphone  Injectable 3 milliGRAM(s) IV Push every 3 hours PRN Severe Pain (7 - 10)  melatonin 3 milliGRAM(s) Oral at bedtime PRN Insomnia  zolpidem 5 milliGRAM(s) Oral at bedtime PRN Insomnia    I&O's Summary    29 Mar 2022 07:01  -  30 Mar 2022 07:00  --------------------------------------------------------  IN: 920 mL / OUT: 625 mL / NET: 295 mL    Vital Signs Last 24 Hrs  T(C): 37.3 (30 Mar 2022 06:02), Max: 37.8 (29 Mar 2022 22:02)  T(F): 99.2 (30 Mar 2022 06:02), Max: 100 (29 Mar 2022 22:02)  HR: 107 (30 Mar 2022 06:02) (96 - 115)  BP: 131/92 (30 Mar 2022 06:02) (131/92 - 142/97)  BP(mean): --  RR: 17 (30 Mar 2022 06:02) (17 - 20)  SpO2: 98% (30 Mar 2022 06:02) (96% - 98%)    CONSTITUTIONAL: lying on side in bed, uncomfortable from pain  RESPIRATORY: Normal respiratory effort; lungs are clear to auscultation bilaterally  CARDIOVASCULAR: low grade tachycardia, normal S1 and S2, no murmur/rub/gallop; No lower extremity edema; Peripheral pulses are 2+ bilaterally  ABDOMEN: Nontender to palpation, normoactive bowel sounds, no rebound/guarding  MUSCULOSKELETAL:  no clubbing or cyanosis of digits; no joint swelling or tenderness to palpation  PSYCH: A+O to person, place, and time; affect appropriate  NEUROLOGY: CN 2-12 are intact and symmetric; no gross sensory deficits   SKIN: No rashes; no palpable lesions    LABS:                        11.0   9.23  )-----------( 387      ( 30 Mar 2022 03:52 )             34.3     03-30    139  |  102  |  10  ----------------------------<  117<H>  3.9   |  26  |  0.78    Ca    9.2      30 Mar 2022 03:52  Phos  3.7     03-30  Mg     2.10     03-30    PT/INR - ( 30 Mar 2022 03:52 )   PT: 15.0 sec;   INR: 1.29 ratio    PTT - ( 30 Mar 2022 03:52 )  PTT:31.9 sec    RADIOLOGY & ADDITIONAL TESTS:    Imaging Personally Reviewed:    Consultant(s) Notes Reviewed:      Care Discussed with Consultants/Other Providers: RN, SW, CM, ACP re overall care

## 2022-03-30 NOTE — PROGRESS NOTE ADULT - ASSESSMENT
56yM s/p eua showing no abscess but showing cavity inferior to true lumen, induration, ulceration. Discussion had with patient to have a colostomy for diversion of stool to allow area to heal. Patient in agreement.    Recommendations:  - abx per ID  - patient on schedule for a laparoscopic loop colostomy on Wednesday 3/30  - NPO  - Will ask ostomy nurses to see pt    - Please page [phone] w/ any questions

## 2022-03-30 NOTE — PROGRESS NOTE ADULT - ASSESSMENT
56 m with HTN< HIV on Biktarvy, last VL:UD and CD4>900, prostate ca 2016 s/p radiation, anal cancer (dx 4/2021) s/p radiation and chemotherapy (last 8/2021) complicated by an ulcer,  p/w rectal bleeding and pain, also b/l inguinal pain  afebrile, tmax: 100 but tachy, WBC normal  CT: Limited evaluation for residual sinus tract. A small droplet of gas is noted in the left intersphincteric space with associated edema, similar in appearance to prior CT dated 1/18/2022. There is an appearance concerning for residual tract  blood and urine cx negative  CD4 now 239    HIV on Biktarvy, VL UD, CD4 now 239 (was >900 before)  anal Ca s/p chemo and RT with rectal ulcer, now with rectal pain and bleeding, CT with unchanged gas and edema in the L intersphincteric space, ?residual tract, MRI showed a 2.9 cm perianal abscess, no fistula  b/l inguinal pain, unclear etiology, LS spine MRI with disc bulges with L4-L5, mild to mod spinal stenosis  s/p Exam under anesthesia with rectal biopsy and rigid sigmoidoscopy 3/25, Severe induration seen circumferentially with ulcerated mucosa and recessed cavity on left side inferior to true lumen. No definite abscess seen therefore no drainage or sphincterotomy was performed, but now plan for loop colostomy today      * c/w zosyn for now as pt is going to OR for loop colostomy but if no evidence of infection will discontinue soon as there was no abscess when pt went for exam under anesthesia and sigmoidoscopy on 3/25  * c/w biktarvy      The above assessment and plan was discussed with the primary team    Denise Gordon MD  contact on teams  After 5pm and on weekends call [phone]

## 2022-03-30 NOTE — BRIEF OPERATIVE NOTE - NSICDXBRIEFPOSTOP_GEN_ALL_CORE_FT
POST-OP DIAGNOSIS:  Rectal ulcer 25-Mar-2022 09:16:33  Job Santamaria  
POST-OP DIAGNOSIS:  Rectal ulcer 25-Mar-2022 09:16:33  Job Santamaria

## 2022-03-30 NOTE — BRIEF OPERATIVE NOTE - NSICDXBRIEFPREOP_GEN_ALL_CORE_FT
PRE-OP DIAGNOSIS:  Rectal ulcer 30-Mar-2022 16:03:43  Irving De La Fuente  
PRE-OP DIAGNOSIS:  Abscess, perirectal 25-Mar-2022 09:16:05  Job Santamaria

## 2022-03-30 NOTE — BRIEF OPERATIVE NOTE - OPERATION/FINDINGS
Laparoscopic mobilization of sigmoid colon, which was able to be brought to abdominal wall without tension  Ostomy created  Sigmoid brought up through ostomy  Sigmoid colostomy matured

## 2022-03-30 NOTE — PROGRESS NOTE ADULT - PROBLEM SELECTOR PLAN 2
intermittent, most likely d/t pain  will check BLE US preop (d/w US [id number], will take down now....) --> if US negative for DVT, pt will be optimized for this important surgery intermittent, most likely d/t pain  BLE US negative

## 2022-03-30 NOTE — PROGRESS NOTE ADULT - SUBJECTIVE AND OBJECTIVE BOX
A Team Progress Note  [id number]    Subjective:   Patient seen at bedside this AM. Reports stable symptoms, had a small bm. NPO since midnight. No fevers subjectively.    Objective:  Vital Signs  T(C): 37.3 (03-30 @ 06:02), Max: 37.8 (03-29 @ 22:02)  HR: 107 (03-30 @ 06:02) (96 - 115)  BP: 131/92 (03-30 @ 06:02) (129/85 - 142/97)  RR: 17 (03-30 @ 06:02) (17 - 20)  SpO2: 98% (03-30 @ 06:02) (96% - 98%)  03-29-22 @ 07:01  -  03-30-22 @ 07:00  --------------------------------------------------------  IN:  Total IN: 0 mL    OUT:    Voided (mL): 625 mL  Total OUT: 625 mL    Total NET: -625 mL          I&O's Detail    03-29-22 @ 07:01  -  03-30-22 @ 07:00  --------------------------------------------------------  IN: 920 mL / OUT: 625 mL / NET: 295 mL        Physical Exam:  GEN: resting in bed comfortably in NAD  RESP: no increased WOB  ABD: non distended  EXTR: warm, well-perfused    Labs:                        11.0   9.23  )-----------( 387      ( 30 Mar 2022 03:52 )             34.3   03-30    139  |  102  |  10  ----------------------------<  117<H>  3.9   |  26  |  0.78    Ca    9.2      30 Mar 2022 03:52  Phos  3.7     03-30  Mg     2.10     03-30      CAPILLARY BLOOD GLUCOSE          Medications:   MEDICATIONS  (STANDING):  atorvastatin 10 milliGRAM(s) Oral at bedtime  bictegravir 50 mG/emtricitabine 200 mG/tenofovir alafenamide 25 mG (BIKTARVY) 1 Tablet(s) Oral daily  chlorhexidine 2% Cloths 1 Application(s) Topical daily  gabapentin 300 milliGRAM(s) Oral <User Schedule>  lactulose Syrup 10 Gram(s) Oral every 6 hours  metoprolol succinate ER 50 milliGRAM(s) Oral daily  morphine ER Tablet 90 milliGRAM(s) Oral every 8 hours  OLANZapine 10 milliGRAM(s) Oral at bedtime  piperacillin/tazobactam IVPB.. 3.375 Gram(s) IV Intermittent every 8 hours  polyethylene glycol 3350 17 Gram(s) Oral <User Schedule>  senna 2 Tablet(s) Oral two times a day    MEDICATIONS  (PRN):  acetaminophen     Tablet .. 650 milliGRAM(s) Oral every 6 hours PRN Temp greater or equal to 38C (100.4F), Mild Pain (1 - 3)  ALPRAZolam 1 milliGRAM(s) Oral daily PRN anxiety  HYDROmorphone   Tablet 8 milliGRAM(s) Oral every 4 hours PRN Moderate Pain (4 - 6)  HYDROmorphone  Injectable 3 milliGRAM(s) IV Push every 3 hours PRN Severe Pain (7 - 10)  melatonin 3 milliGRAM(s) Oral at bedtime PRN Insomnia  zolpidem 5 milliGRAM(s) Oral at bedtime PRN Insomnia      Imaging:

## 2022-03-31 ENCOUNTER — APPOINTMENT (OUTPATIENT)
Dept: HYPERBARIC MEDICINE | Facility: CLINIC | Age: 57
End: 2022-03-31

## 2022-03-31 LAB
ANION GAP SERPL CALC-SCNC: 12 MMOL/L — SIGNIFICANT CHANGE UP (ref 7–14)
BASOPHILS # BLD AUTO: 0.01 K/UL — SIGNIFICANT CHANGE UP (ref 0–0.2)
BASOPHILS NFR BLD AUTO: 0.1 % — SIGNIFICANT CHANGE UP (ref 0–2)
BUN SERPL-MCNC: 12 MG/DL — SIGNIFICANT CHANGE UP (ref 7–23)
CALCIUM SERPL-MCNC: 9.3 MG/DL — SIGNIFICANT CHANGE UP (ref 8.4–10.5)
CHLORIDE SERPL-SCNC: 100 MMOL/L — SIGNIFICANT CHANGE UP (ref 98–107)
CO2 SERPL-SCNC: 26 MMOL/L — SIGNIFICANT CHANGE UP (ref 22–31)
CREAT SERPL-MCNC: 0.73 MG/DL — SIGNIFICANT CHANGE UP (ref 0.5–1.3)
CULTURE RESULTS: SIGNIFICANT CHANGE UP
CULTURE RESULTS: SIGNIFICANT CHANGE UP
EGFR: 107 ML/MIN/1.73M2 — SIGNIFICANT CHANGE UP
EOSINOPHIL # BLD AUTO: 0 K/UL — SIGNIFICANT CHANGE UP (ref 0–0.5)
EOSINOPHIL NFR BLD AUTO: 0 % — SIGNIFICANT CHANGE UP (ref 0–6)
GLUCOSE SERPL-MCNC: 130 MG/DL — HIGH (ref 70–99)
HCT VFR BLD CALC: 34.5 % — LOW (ref 39–50)
HGB BLD-MCNC: 11.2 G/DL — LOW (ref 13–17)
IANC: 9.25 K/UL — HIGH (ref 1.8–7.4)
IMM GRANULOCYTES NFR BLD AUTO: 0.5 % — SIGNIFICANT CHANGE UP (ref 0–1.5)
LYMPHOCYTES # BLD AUTO: 1.14 K/UL — SIGNIFICANT CHANGE UP (ref 1–3.3)
LYMPHOCYTES # BLD AUTO: 9.9 % — LOW (ref 13–44)
MAGNESIUM SERPL-MCNC: 2.1 MG/DL — SIGNIFICANT CHANGE UP (ref 1.6–2.6)
MCHC RBC-ENTMCNC: 27.8 PG — SIGNIFICANT CHANGE UP (ref 27–34)
MCHC RBC-ENTMCNC: 32.5 GM/DL — SIGNIFICANT CHANGE UP (ref 32–36)
MCV RBC AUTO: 85.6 FL — SIGNIFICANT CHANGE UP (ref 80–100)
MONOCYTES # BLD AUTO: 1.02 K/UL — HIGH (ref 0–0.9)
MONOCYTES NFR BLD AUTO: 8.9 % — SIGNIFICANT CHANGE UP (ref 2–14)
NEUTROPHILS # BLD AUTO: 9.25 K/UL — HIGH (ref 1.8–7.4)
NEUTROPHILS NFR BLD AUTO: 80.6 % — HIGH (ref 43–77)
NRBC # BLD: 0 /100 WBCS — SIGNIFICANT CHANGE UP
NRBC # FLD: 0 K/UL — SIGNIFICANT CHANGE UP
PHOSPHATE SERPL-MCNC: 3.5 MG/DL — SIGNIFICANT CHANGE UP (ref 2.5–4.5)
PLATELET # BLD AUTO: 420 K/UL — HIGH (ref 150–400)
POTASSIUM SERPL-MCNC: 3.8 MMOL/L — SIGNIFICANT CHANGE UP (ref 3.5–5.3)
POTASSIUM SERPL-SCNC: 3.8 MMOL/L — SIGNIFICANT CHANGE UP (ref 3.5–5.3)
RBC # BLD: 4.03 M/UL — LOW (ref 4.2–5.8)
RBC # FLD: 13.6 % — SIGNIFICANT CHANGE UP (ref 10.3–14.5)
SODIUM SERPL-SCNC: 138 MMOL/L — SIGNIFICANT CHANGE UP (ref 135–145)
SPECIMEN SOURCE: SIGNIFICANT CHANGE UP
SPECIMEN SOURCE: SIGNIFICANT CHANGE UP
WBC # BLD: 11.48 K/UL — HIGH (ref 3.8–10.5)
WBC # FLD AUTO: 11.48 K/UL — HIGH (ref 3.8–10.5)

## 2022-03-31 PROCEDURE — 99233 SBSQ HOSP IP/OBS HIGH 50: CPT

## 2022-03-31 PROCEDURE — 99232 SBSQ HOSP IP/OBS MODERATE 35: CPT

## 2022-03-31 RX ORDER — ENOXAPARIN SODIUM 100 MG/ML
40 INJECTION SUBCUTANEOUS EVERY 24 HOURS
Refills: 0 | Status: DISCONTINUED | OUTPATIENT
Start: 2022-04-01 | End: 2022-04-02

## 2022-03-31 RX ORDER — HYDROMORPHONE HYDROCHLORIDE 2 MG/ML
4 INJECTION INTRAMUSCULAR; INTRAVENOUS; SUBCUTANEOUS
Refills: 0 | Status: DISCONTINUED | OUTPATIENT
Start: 2022-03-31 | End: 2022-04-01

## 2022-03-31 RX ORDER — ACETAMINOPHEN 500 MG
1000 TABLET ORAL ONCE
Refills: 0 | Status: COMPLETED | OUTPATIENT
Start: 2022-03-31 | End: 2022-03-31

## 2022-03-31 RX ORDER — SENNA PLUS 8.6 MG/1
2 TABLET ORAL AT BEDTIME
Refills: 0 | Status: DISCONTINUED | OUTPATIENT
Start: 2022-03-31 | End: 2022-04-02

## 2022-03-31 RX ORDER — HYDROMORPHONE HYDROCHLORIDE 2 MG/ML
8 INJECTION INTRAMUSCULAR; INTRAVENOUS; SUBCUTANEOUS EVERY 4 HOURS
Refills: 0 | Status: DISCONTINUED | OUTPATIENT
Start: 2022-03-31 | End: 2022-04-01

## 2022-03-31 RX ORDER — HYDROMORPHONE HYDROCHLORIDE 2 MG/ML
3 INJECTION INTRAMUSCULAR; INTRAVENOUS; SUBCUTANEOUS
Refills: 0 | Status: DISCONTINUED | OUTPATIENT
Start: 2022-03-31 | End: 2022-03-31

## 2022-03-31 RX ORDER — ZOLPIDEM TARTRATE 10 MG/1
5 TABLET ORAL AT BEDTIME
Refills: 0 | Status: DISCONTINUED | OUTPATIENT
Start: 2022-03-31 | End: 2022-04-02

## 2022-03-31 RX ORDER — POLYETHYLENE GLYCOL 3350 17 G/17G
17 POWDER, FOR SOLUTION ORAL
Refills: 0 | Status: DISCONTINUED | OUTPATIENT
Start: 2022-03-31 | End: 2022-04-02

## 2022-03-31 RX ORDER — MORPHINE SULFATE 50 MG/1
90 CAPSULE, EXTENDED RELEASE ORAL EVERY 8 HOURS
Refills: 0 | Status: DISCONTINUED | OUTPATIENT
Start: 2022-03-31 | End: 2022-04-02

## 2022-03-31 RX ORDER — ENOXAPARIN SODIUM 100 MG/ML
40 INJECTION SUBCUTANEOUS ONCE
Refills: 0 | Status: COMPLETED | OUTPATIENT
Start: 2022-03-31 | End: 2022-03-31

## 2022-03-31 RX ORDER — ALPRAZOLAM 0.25 MG
1 TABLET ORAL DAILY
Refills: 0 | Status: DISCONTINUED | OUTPATIENT
Start: 2022-03-31 | End: 2022-04-02

## 2022-03-31 RX ADMIN — PIPERACILLIN AND TAZOBACTAM 25 GRAM(S): 4; .5 INJECTION, POWDER, LYOPHILIZED, FOR SOLUTION INTRAVENOUS at 06:24

## 2022-03-31 RX ADMIN — Medication 50 MILLIGRAM(S): at 06:30

## 2022-03-31 RX ADMIN — MORPHINE SULFATE 90 MILLIGRAM(S): 50 CAPSULE, EXTENDED RELEASE ORAL at 09:07

## 2022-03-31 RX ADMIN — HYDROMORPHONE HYDROCHLORIDE 3 MILLIGRAM(S): 2 INJECTION INTRAMUSCULAR; INTRAVENOUS; SUBCUTANEOUS at 00:38

## 2022-03-31 RX ADMIN — PIPERACILLIN AND TAZOBACTAM 25 GRAM(S): 4; .5 INJECTION, POWDER, LYOPHILIZED, FOR SOLUTION INTRAVENOUS at 13:54

## 2022-03-31 RX ADMIN — HYDROMORPHONE HYDROCHLORIDE 3 MILLIGRAM(S): 2 INJECTION INTRAMUSCULAR; INTRAVENOUS; SUBCUTANEOUS at 06:23

## 2022-03-31 RX ADMIN — ENOXAPARIN SODIUM 40 MILLIGRAM(S): 100 INJECTION SUBCUTANEOUS at 19:47

## 2022-03-31 RX ADMIN — POLYETHYLENE GLYCOL 3350 17 GRAM(S): 17 POWDER, FOR SOLUTION ORAL at 13:55

## 2022-03-31 RX ADMIN — LACTULOSE 10 GRAM(S): 10 SOLUTION ORAL at 06:31

## 2022-03-31 RX ADMIN — SENNA PLUS 2 TABLET(S): 8.6 TABLET ORAL at 06:23

## 2022-03-31 RX ADMIN — GABAPENTIN 300 MILLIGRAM(S): 400 CAPSULE ORAL at 13:54

## 2022-03-31 RX ADMIN — MORPHINE SULFATE 90 MILLIGRAM(S): 50 CAPSULE, EXTENDED RELEASE ORAL at 18:32

## 2022-03-31 RX ADMIN — HYDROMORPHONE HYDROCHLORIDE 8 MILLIGRAM(S): 2 INJECTION INTRAMUSCULAR; INTRAVENOUS; SUBCUTANEOUS at 04:16

## 2022-03-31 RX ADMIN — HYDROMORPHONE HYDROCHLORIDE 3 MILLIGRAM(S): 2 INJECTION INTRAMUSCULAR; INTRAVENOUS; SUBCUTANEOUS at 10:37

## 2022-03-31 RX ADMIN — HYDROMORPHONE HYDROCHLORIDE 4 MILLIGRAM(S): 2 INJECTION INTRAMUSCULAR; INTRAVENOUS; SUBCUTANEOUS at 14:09

## 2022-03-31 RX ADMIN — ATORVASTATIN CALCIUM 10 MILLIGRAM(S): 80 TABLET, FILM COATED ORAL at 22:04

## 2022-03-31 RX ADMIN — Medication 400 MILLIGRAM(S): at 03:09

## 2022-03-31 RX ADMIN — MORPHINE SULFATE 90 MILLIGRAM(S): 50 CAPSULE, EXTENDED RELEASE ORAL at 23:11

## 2022-03-31 RX ADMIN — GABAPENTIN 300 MILLIGRAM(S): 400 CAPSULE ORAL at 08:56

## 2022-03-31 RX ADMIN — Medication 3 MILLIGRAM(S): at 22:04

## 2022-03-31 RX ADMIN — HYDROMORPHONE HYDROCHLORIDE 3 MILLIGRAM(S): 2 INJECTION INTRAMUSCULAR; INTRAVENOUS; SUBCUTANEOUS at 11:02

## 2022-03-31 RX ADMIN — CHLORHEXIDINE GLUCONATE 1 APPLICATION(S): 213 SOLUTION TOPICAL at 11:49

## 2022-03-31 RX ADMIN — SENNA PLUS 2 TABLET(S): 8.6 TABLET ORAL at 19:07

## 2022-03-31 RX ADMIN — HYDROMORPHONE HYDROCHLORIDE 4 MILLIGRAM(S): 2 INJECTION INTRAMUSCULAR; INTRAVENOUS; SUBCUTANEOUS at 19:04

## 2022-03-31 RX ADMIN — HYDROMORPHONE HYDROCHLORIDE 4 MILLIGRAM(S): 2 INJECTION INTRAMUSCULAR; INTRAVENOUS; SUBCUTANEOUS at 18:32

## 2022-03-31 RX ADMIN — MORPHINE SULFATE 90 MILLIGRAM(S): 50 CAPSULE, EXTENDED RELEASE ORAL at 23:41

## 2022-03-31 RX ADMIN — Medication 1000 MILLIGRAM(S): at 04:09

## 2022-03-31 RX ADMIN — HYDROMORPHONE HYDROCHLORIDE 3 MILLIGRAM(S): 2 INJECTION INTRAMUSCULAR; INTRAVENOUS; SUBCUTANEOUS at 01:38

## 2022-03-31 RX ADMIN — HYDROMORPHONE HYDROCHLORIDE 4 MILLIGRAM(S): 2 INJECTION INTRAMUSCULAR; INTRAVENOUS; SUBCUTANEOUS at 22:13

## 2022-03-31 RX ADMIN — GABAPENTIN 300 MILLIGRAM(S): 400 CAPSULE ORAL at 22:04

## 2022-03-31 RX ADMIN — MORPHINE SULFATE 90 MILLIGRAM(S): 50 CAPSULE, EXTENDED RELEASE ORAL at 00:38

## 2022-03-31 RX ADMIN — HYDROMORPHONE HYDROCHLORIDE 3 MILLIGRAM(S): 2 INJECTION INTRAMUSCULAR; INTRAVENOUS; SUBCUTANEOUS at 05:23

## 2022-03-31 RX ADMIN — SENNA PLUS 2 TABLET(S): 8.6 TABLET ORAL at 22:05

## 2022-03-31 RX ADMIN — POLYETHYLENE GLYCOL 3350 17 GRAM(S): 17 POWDER, FOR SOLUTION ORAL at 08:56

## 2022-03-31 RX ADMIN — BICTEGRAVIR SODIUM, EMTRICITABINE, AND TENOFOVIR ALAFENAMIDE FUMARATE 1 TABLET(S): 30; 120; 15 TABLET ORAL at 11:49

## 2022-03-31 RX ADMIN — HYDROMORPHONE HYDROCHLORIDE 4 MILLIGRAM(S): 2 INJECTION INTRAMUSCULAR; INTRAVENOUS; SUBCUTANEOUS at 15:05

## 2022-03-31 RX ADMIN — HYDROMORPHONE HYDROCHLORIDE 8 MILLIGRAM(S): 2 INJECTION INTRAMUSCULAR; INTRAVENOUS; SUBCUTANEOUS at 03:16

## 2022-03-31 RX ADMIN — ZOLPIDEM TARTRATE 5 MILLIGRAM(S): 10 TABLET ORAL at 23:14

## 2022-03-31 RX ADMIN — OLANZAPINE 10 MILLIGRAM(S): 15 TABLET, FILM COATED ORAL at 22:04

## 2022-03-31 RX ADMIN — HYDROMORPHONE HYDROCHLORIDE 4 MILLIGRAM(S): 2 INJECTION INTRAMUSCULAR; INTRAVENOUS; SUBCUTANEOUS at 22:28

## 2022-03-31 NOTE — PROGRESS NOTE ADULT - ASSESSMENT
56 m with HTN< HIV on Biktarvy, last VL:UD and CD4>900, prostate ca 2016 s/p radiation, anal cancer (dx 4/2021) s/p radiation and chemotherapy (last 8/2021) complicated by an ulcer,  p/w rectal bleeding and pain, also b/l inguinal pain  afebrile, tmax: 100 but tachy, WBC normal  CT: Limited evaluation for residual sinus tract. A small droplet of gas is noted in the left intersphincteric space with associated edema, similar in appearance to prior CT dated 1/18/2022. There is an appearance concerning for residual tract  blood and urine cx negative  CD4 now 239    HIV on Biktarvy, VL UD, CD4 now 239 (was >900 before)  anal Ca s/p chemo and RT with rectal ulcer, now with rectal pain and bleeding, CT with unchanged gas and edema in the L intersphincteric space, ?residual tract, MRI showed a 2.9 cm perianal abscess, no fistula  b/l inguinal pain, unclear etiology, LS spine MRI with disc bulges with L4-L5, mild to mod spinal stenosis  s/p Exam under anesthesia with rectal biopsy and rigid sigmoidoscopy 3/25, Severe induration seen circumferentially with ulcerated mucosa and recessed cavity on left side inferior to true lumen. No definite abscess seen therefore no drainage or sphincterotomy was performed, but now plan for loop colostomy today      * s/p OR for loop colostomy 3/30   * there was ?abscess on CT and MRI so pt has been on zosyn but the exam under anesthesia and sigmoidoscopy 3/25 did show any abscess so no drainage was done, anyway pt has been on zosyn since 3/17, will complete a 2 week course today,  discontinue the zosyn  * c/w biktarvy  * will sign off, please call with questions      The above assessment and plan was discussed with the primary team    Denise Gordon MD  contact on teams  After 5pm and on weekends call [phone]

## 2022-03-31 NOTE — PROGRESS NOTE ADULT - SUBJECTIVE AND OBJECTIVE BOX
Patient is a 56y old  Male who presents with a chief complaint of Rectal pain and bleeding, difficulty ambulating (30 Mar 2022 16:11)    SUBJECTIVE / OVERNIGHT EVENTS:  S/p colostomy yesterday, blood from ostomy, pain....    MEDICATIONS  (STANDING):  atorvastatin 10 milliGRAM(s) Oral at bedtime  bictegravir 50 mG/emtricitabine 200 mG/tenofovir alafenamide 25 mG (BIKTARVY) 1 Tablet(s) Oral daily  chlorhexidine 2% Cloths 1 Application(s) Topical daily  gabapentin 300 milliGRAM(s) Oral <User Schedule>  metoprolol succinate ER 50 milliGRAM(s) Oral daily  morphine ER Tablet 90 milliGRAM(s) Oral every 8 hours  OLANZapine 10 milliGRAM(s) Oral at bedtime  piperacillin/tazobactam IVPB.. 3.375 Gram(s) IV Intermittent every 8 hours  polyethylene glycol 3350 17 Gram(s) Oral <User Schedule>  senna 2 Tablet(s) Oral two times a day    MEDICATIONS  (PRN):  acetaminophen     Tablet .. 650 milliGRAM(s) Oral every 6 hours PRN Temp greater or equal to 38C (100.4F), Mild Pain (1 - 3)  ALPRAZolam 1 milliGRAM(s) Oral daily PRN anxiety  HYDROmorphone   Tablet 8 milliGRAM(s) Oral every 4 hours PRN Moderate Pain (4 - 6)  HYDROmorphone  Injectable 3 milliGRAM(s) IV Push every 3 hours PRN Severe Pain (7 - 10)  melatonin 3 milliGRAM(s) Oral at bedtime PRN Insomnia  zolpidem 5 milliGRAM(s) Oral at bedtime PRN Insomnia    I&O's Summary    30 Mar 2022 07:01  -  31 Mar 2022 07:00  --------------------------------------------------------  IN: 0 mL / OUT: 420 mL / NET: -420 mL    Vital Signs Last 24 Hrs  T(C): 36.7 (31 Mar 2022 06:20), Max: 37.2 (30 Mar 2022 13:41)  T(F): 98.1 (31 Mar 2022 06:20), Max: 98.9 (30 Mar 2022 13:41)  HR: 90 (31 Mar 2022 06:20) (90 - 114)  BP: 106/76 (31 Mar 2022 06:20) (106/76 - 186/91)  BP(mean): 121 (30 Mar 2022 19:00) (104 - 129)  RR: 18 (31 Mar 2022 06:20) (12 - 39)  SpO2: 100% (31 Mar 2022 06:20) (94% - 100%)    CONSTITUTIONAL: lying on side in bed, uncomfortable from pain  RESPIRATORY: Normal respiratory effort; lungs are clear to auscultation bilaterally  CARDIOVASCULAR: low grade tachycardia, normal S1 and S2, no murmur/rub/gallop; No lower extremity edema; Peripheral pulses are 2+ bilaterally  ABDOMEN: Nontender to palpation, normoactive bowel sounds, no rebound/guarding  MUSCULOSKELETAL:  no clubbing or cyanosis of digits; no joint swelling or tenderness to palpation  PSYCH: A+O to person, place, and time; affect appropriate  NEUROLOGY: CN 2-12 are intact and symmetric; no gross sensory deficits   SKIN: No rashes; no palpable lesions    LABS:                        11.0   9.23  )-----------( 387      ( 30 Mar 2022 03:52 )             34.3     03-30    139  |  102  |  10  ----------------------------<  117<H>  3.9   |  26  |  0.78    Ca    9.2      30 Mar 2022 03:52  Phos  3.7     03-30  Mg     2.10     03-30    PT/INR - ( 30 Mar 2022 03:52 )   PT: 15.0 sec;   INR: 1.29 ratio    PTT - ( 30 Mar 2022 03:52 )  PTT:31.9 sec    RADIOLOGY & ADDITIONAL TESTS:    Imaging Personally Reviewed:    Consultant(s) Notes Reviewed:      Care Discussed with Consultants/Other Providers: RN, SW, CM, ACP, surgery re overall care  Patient is a 56y old  Male who presents with a chief complaint of Rectal pain and bleeding, difficulty ambulating (30 Mar 2022 16:11)    SUBJECTIVE / OVERNIGHT EVENTS:  S/p colostomy yesterday, blood from ostomy, pain from surgical site. No other complaints.    MEDICATIONS  (STANDING):  atorvastatin 10 milliGRAM(s) Oral at bedtime  bictegravir 50 mG/emtricitabine 200 mG/tenofovir alafenamide 25 mG (BIKTARVY) 1 Tablet(s) Oral daily  chlorhexidine 2% Cloths 1 Application(s) Topical daily  gabapentin 300 milliGRAM(s) Oral <User Schedule>  metoprolol succinate ER 50 milliGRAM(s) Oral daily  morphine ER Tablet 90 milliGRAM(s) Oral every 8 hours  OLANZapine 10 milliGRAM(s) Oral at bedtime  piperacillin/tazobactam IVPB.. 3.375 Gram(s) IV Intermittent every 8 hours  polyethylene glycol 3350 17 Gram(s) Oral <User Schedule>  senna 2 Tablet(s) Oral two times a day    MEDICATIONS  (PRN):  acetaminophen     Tablet .. 650 milliGRAM(s) Oral every 6 hours PRN Temp greater or equal to 38C (100.4F), Mild Pain (1 - 3)  ALPRAZolam 1 milliGRAM(s) Oral daily PRN anxiety  HYDROmorphone   Tablet 8 milliGRAM(s) Oral every 4 hours PRN Moderate Pain (4 - 6)  HYDROmorphone  Injectable 3 milliGRAM(s) IV Push every 3 hours PRN Severe Pain (7 - 10)  melatonin 3 milliGRAM(s) Oral at bedtime PRN Insomnia  zolpidem 5 milliGRAM(s) Oral at bedtime PRN Insomnia    I&O's Summary    30 Mar 2022 07:01  -  31 Mar 2022 07:00  --------------------------------------------------------  IN: 0 mL / OUT: 420 mL / NET: -420 mL    Vital Signs Last 24 Hrs  T(C): 36.7 (31 Mar 2022 06:20), Max: 37.2 (30 Mar 2022 13:41)  T(F): 98.1 (31 Mar 2022 06:20), Max: 98.9 (30 Mar 2022 13:41)  HR: 90 (31 Mar 2022 06:20) (90 - 114)  BP: 106/76 (31 Mar 2022 06:20) (106/76 - 186/91)  BP(mean): 121 (30 Mar 2022 19:00) (104 - 129)  RR: 18 (31 Mar 2022 06:20) (12 - 39)  SpO2: 100% (31 Mar 2022 06:20) (94% - 100%)    CONSTITUTIONAL: lying on side in bed, uncomfortable from pain  RESPIRATORY: Normal respiratory effort; lungs are clear to auscultation bilaterally  CARDIOVASCULAR: low grade tachycardia, normal S1 and S2, no murmur/rub/gallop; No lower extremity edema; Peripheral pulses are 2+ bilaterally  ABDOMEN: +BS, ND, +ostomy with brown stool in pouch, mild tenderness  MUSCULOSKELETAL:  no clubbing or cyanosis of digits; no joint swelling or tenderness to palpation  PSYCH: A+O to person, place, and time; affect appropriate  NEUROLOGY: CN 2-12 are intact and symmetric; no gross sensory deficits   SKIN: No rashes; no palpable lesions    LABS:                        11.0   9.23  )-----------( 387      ( 30 Mar 2022 03:52 )             34.3     03-30    139  |  102  |  10  ----------------------------<  117<H>  3.9   |  26  |  0.78    Ca    9.2      30 Mar 2022 03:52  Phos  3.7     03-30  Mg     2.10     03-30    PT/INR - ( 30 Mar 2022 03:52 )   PT: 15.0 sec;   INR: 1.29 ratio    PTT - ( 30 Mar 2022 03:52 )  PTT:31.9 sec    RADIOLOGY & ADDITIONAL TESTS:    Imaging Personally Reviewed:    Consultant(s) Notes Reviewed:      Care Discussed with Consultants/Other Providers: RN, SW, CM, ACP, surgery re overall care

## 2022-03-31 NOTE — PROGRESS NOTE ADULT - ASSESSMENT
56M rectal cancer s/p radiation, prostate cancer s/p radiation, HIV on biktarvy, diverticulosis w/ h/o diverticulitis, anxiety, depression, HTN, chronic constipation presents with uncontrollable rectal pain, bleeding, subjective fevers, chills, diaphoresis, and difficulty ambulating.  3/30 s/p lap colostomy

## 2022-03-31 NOTE — PROGRESS NOTE ADULT - PROBLEM SELECTOR PLAN 6
Thank you for allowing us to participate in your patient's care. We will continue to follow with you. Please page [phone] for any q's or c's.     Terri Roberts D.O.   Palliative Medicine.

## 2022-03-31 NOTE — PROGRESS NOTE ADULT - ASSESSMENT
56yM s/p eua showing no abscess but showing cavity inferior to true lumen, induration, ulceration. Patient now POD 1 s/p sigmoid colostomy creation    Recommendations:  - abx per ID  - advance to CLD  -monitor UOP  -mointor ostomy output  -OOB      - Please page [phone] w/ any questions

## 2022-03-31 NOTE — PROGRESS NOTE ADULT - SUBJECTIVE AND OBJECTIVE BOX
Follow Up:  HIV, rectal bleeding, ?collection    Interval History: s/p OR and ostomy creation, had some bleeding in the ostomy today    ROS:      All other systems negative    Constitutional: no fever, no chills  Cardiovascular:  no chest pain, no palpitation  Respiratory:  no SOB, no cough  GI: some bleeding in the osteomy  urinary: no dysuria, no hematuria, no flank pain  musculoskeletal: b/l inguinal pain  skin:  no rash  neurology:  no headache, no seizure      Allergies  No Known Allergies        ANTIMICROBIALS:  bictegravir 50 mG/emtricitabine 200 mG/tenofovir alafenamide 25 mG (BIKTARVY) 1 daily  piperacillin/tazobactam IVPB.. 3.375 every 8 hours      OTHER MEDS:  acetaminophen     Tablet .. 650 milliGRAM(s) Oral every 6 hours PRN  ALPRAZolam 1 milliGRAM(s) Oral daily PRN  atorvastatin 10 milliGRAM(s) Oral at bedtime  chlorhexidine 2% Cloths 1 Application(s) Topical daily  gabapentin 300 milliGRAM(s) Oral <User Schedule>  HYDROmorphone   Tablet 8 milliGRAM(s) Oral every 4 hours PRN  HYDROmorphone  Injectable 4 milliGRAM(s) IV Push every 3 hours PRN  melatonin 3 milliGRAM(s) Oral at bedtime PRN  metoprolol succinate ER 50 milliGRAM(s) Oral daily  morphine ER Tablet 90 milliGRAM(s) Oral every 8 hours  OLANZapine 10 milliGRAM(s) Oral at bedtime  polyethylene glycol 3350 17 Gram(s) Oral <User Schedule>  senna 2 Tablet(s) Oral two times a day  zolpidem 5 milliGRAM(s) Oral at bedtime PRN      Vital Signs Last 24 Hrs  T(C): 37.3 (31 Mar 2022 13:49), Max: 37.3 (31 Mar 2022 13:49)  T(F): 99.1 (31 Mar 2022 13:49), Max: 99.1 (31 Mar 2022 13:49)  HR: 94 (31 Mar 2022 13:49) (90 - 114)  BP: 153/99 (31 Mar 2022 13:49) (106/76 - 174/97)  BP(mean): 121 (30 Mar 2022 19:00) (104 - 129)  RR: 18 (31 Mar 2022 13:49) (12 - 39)  SpO2: 97% (31 Mar 2022 13:49) (94% - 100%)    Physical Exam:  General:    NAD,  non toxic  Cardio:     regular S1, S2  Respiratory:    clear b/l,    no wheezing  abd:     soft,   BS +,   no tenderness  :   no CVAT,  no suprapubic tenderness,   no  davis  Musculoskeletal:   no joint swelling  vascular: no phlebitis  Skin:    no rash                          11.2   11.48 )-----------( 420      ( 31 Mar 2022 08:30 )             34.5       03-31    138  |  100  |  12  ----------------------------<  130<H>  3.8   |  26  |  0.73    Ca    9.3      31 Mar 2022 08:30  Phos  3.5     03-31  Mg     2.10     03-31            MICROBIOLOGY:  v  .Blood Blood-Peripheral  03-26-22   No Growth Final  --  --      Clean Catch Clean Catch (Midstream)  03-17-22   No growth  --  --      .Blood Blood-Peripheral  03-17-22   No Growth Final  --  --      .Blood Blood-Peripheral  03-17-22   No Growth Final  --  --        HIV-1 RNA Quantitative, Viral Load Log: NOT DET. lg /mL (03-18-22 @ 12:31)          RADIOLOGY:  Images independently visualized and reviewed personally, findings as below  < from: US Duplex Venous Lower Ext Complete, Bilateral (03.29.22 @ 17:51) >  IMPRESSION:  No evidence of deep venous thrombosis in either lower extremity.    < end of copied text >  < from: MR Pelvis/Rectal/Anal w/wo IV Cont (03.21.22 @ 09:11) >    IMPRESSION:  A 2.9 cm left perianal abscess. No evidence of associated fistula.    < end of copied text >

## 2022-03-31 NOTE — ADVANCED PRACTICE NURSE CONSULT - ASSESSMENT
Patient AxOx4, ready and willing to learn. Actively participating in pouch change. Overview of surgical procedure and need of ostomy reinforced. Terms defined utilized: Ostomy, Colostomy, wafer, pouch, stoma. Frequency of pouch change and emptying discussed, teach back method utilized. Stool consistency with Colostomy reviewed. Importance of hydration reinforced.  Patient able to show back how to open, empty and close pouch. Removed ostomy pouch using pull and push technique, cleansed skin with water, patted dry. Taught patient how to properly assess stoma viability and peristomal skin. Stoma difficulty to see due to location, advised to use a mirror during pouch change. Patient actively participated in stoma measurement, creating oval template, cutting wafer, applying barrier ring, applying pouch. Educated on need for rubber cathter to anchor stoma and timeframe for removal. Demonstrated use of powder and LBF for irritated peristomal skin. Reviewed s/s to report to MD. Informed that visiting nurse would follow up after discharge to set up account for supplies.     Stoma size: 3/4" x 1 3/4" See A&I flowsheet for full stoma assessment details.

## 2022-03-31 NOTE — PROGRESS NOTE ADULT - PROBLEM SELECTOR PLAN 1
acute on chronic rectal pain associated w/ hematochezia and constipation, s/p multiple radiation treatments to the area due to h/o anal cancer and prostate cancer; chronically on MS contin 60mg q8 and Dilaudid PO 8mg q4, which has not been adequately controlling the pain  - MRI perianal abscess- OR 3/25 severe induration seen circumferentially with ulcerated mucosa and recessed cavity on left side inferior to true lumen. No definite abscess seen therefore no drainage or sphincterotomy was performed. Surgery recommend diverting colostomy -  pt agrees, scheduled for 3/30 - pt optimized for procedure  - Continue zosyn for now  - Palliative consult helping optimize pain management  - c/w bowel regimen (was on home Relistor) acute on chronic rectal pain associated w/ hematochezia and constipation, s/p multiple radiation treatments to the area due to h/o anal cancer and prostate cancer; chronically on MS contin 60mg q8 and Dilaudid PO 8mg q4, which has not been adequately controlling the pain  - MRI perianal abscess- OR 3/25 severe induration seen circumferentially with ulcerated mucosa and recessed cavity on left side inferior to true lumen. No definite abscess seen therefore no drainage or sphincterotomy was performed.   3/30 s/p lap colostomy --> post op management per surgery, advance diet as tolerates  - Palliative consult helping optimize pain management acute on chronic rectal pain associated w/ hematochezia and constipation, s/p multiple radiation treatments to the area due to h/o anal cancer and prostate cancer; chronically on MS contin 60mg q8 and Dilaudid PO 8mg q4, which has not been adequately controlling the pain  - MRI perianal abscess- OR 3/25 severe induration seen circumferentially with ulcerated mucosa and recessed cavity on left side inferior to true lumen. No definite abscess seen therefore no drainage or sphincterotomy was performed.   3/30 s/p lap colostomy --> post op management per surgery, advance diet as tolerates  - d/w surgery team - ok to start lovenox for DVT prophylaxis  - Palliative consult helping optimize pain management

## 2022-03-31 NOTE — PROGRESS NOTE ADULT - SUBJECTIVE AND OBJECTIVE BOX
A Team (General Surgery) Daily Progress Note    SUBJECTIVE:  Pt seen and examined in AM. No acute events overnight. Patient denies acute onset abdominal pain, NVD, fevers, chills, lightheadedness, SOB, CP. -flatus/-BM. NPO    OBJECTIVE:  Vital Signs Last 24 Hrs  T(C): 37.3 (31 Mar 2022 13:49), Max: 37.3 (31 Mar 2022 13:49)  T(F): 99.1 (31 Mar 2022 13:49), Max: 99.1 (31 Mar 2022 13:49)  HR: 94 (31 Mar 2022 13:49) (90 - 114)  BP: 153/99 (31 Mar 2022 13:49) (106/76 - 174/97)  BP(mean): 121 (30 Mar 2022 19:00) (104 - 129)  RR: 18 (31 Mar 2022 13:49) (12 - 32)  SpO2: 97% (31 Mar 2022 13:49) (94% - 100%)    I&O's Detail    30 Mar 2022 07:01  -  31 Mar 2022 07:00  --------------------------------------------------------  IN:  Total IN: 0 mL    OUT:    Voided (mL): 420 mL  Total OUT: 420 mL    Total NET: -420 mL        Exam:  GEN: A&Ox3, NAD  HEENT: atraumatic, normocephalic  CV: no JVD  RESP: no increased work of breathing, no use of accessory muscles  GI/ABD: soft, appropriately tender, mildly distended, ostomy viable and pink, incisions c/d/i  EXTREMITIES: warm, pink, well-perfused                        11.2   11.48 )-----------( 420      ( 31 Mar 2022 08:30 )             34.5       03-31    138  |  100  |  12  ----------------------------<  130<H>  3.8   |  26  |  0.73    Ca    9.3      31 Mar 2022 08:30  Phos  3.5     03-31  Mg     2.10     03-31        PT/INR - ( 30 Mar 2022 03:52 )   PT: 15.0 sec;   INR: 1.29 ratio         PTT - ( 30 Mar 2022 03:52 )  PTT:31.9 sec           A Team (General Surgery) Daily Progress Note    SUBJECTIVE:  Pt seen and examined in AM. No acute events overnight. Patient denies acute onset abdominal pain, NVD, fevers, chills, lightheadedness, SOB, CP. +flatus/+BM. NPO    OBJECTIVE:  Vital Signs Last 24 Hrs  T(C): 37.3 (31 Mar 2022 13:49), Max: 37.3 (31 Mar 2022 13:49)  T(F): 99.1 (31 Mar 2022 13:49), Max: 99.1 (31 Mar 2022 13:49)  HR: 94 (31 Mar 2022 13:49) (90 - 114)  BP: 153/99 (31 Mar 2022 13:49) (106/76 - 174/97)  BP(mean): 121 (30 Mar 2022 19:00) (104 - 129)  RR: 18 (31 Mar 2022 13:49) (12 - 32)  SpO2: 97% (31 Mar 2022 13:49) (94% - 100%)    I&O's Detail    30 Mar 2022 07:01  -  31 Mar 2022 07:00  --------------------------------------------------------  IN:  Total IN: 0 mL    OUT:    Voided (mL): 420 mL  Total OUT: 420 mL    Total NET: -420 mL        Exam:  GEN: A&Ox3, NAD  HEENT: atraumatic, normocephalic  CV: no JVD  RESP: no increased work of breathing, no use of accessory muscles  GI/ABD: soft, appropriately tender, mildly distended, ostomy viable and pink, incisions c/d/i  EXTREMITIES: warm, pink, well-perfused                        11.2   11.48 )-----------( 420      ( 31 Mar 2022 08:30 )             34.5       03-31    138  |  100  |  12  ----------------------------<  130<H>  3.8   |  26  |  0.73    Ca    9.3      31 Mar 2022 08:30  Phos  3.5     03-31  Mg     2.10     03-31        PT/INR - ( 30 Mar 2022 03:52 )   PT: 15.0 sec;   INR: 1.29 ratio         PTT - ( 30 Mar 2022 03:52 )  PTT:31.9 sec

## 2022-03-31 NOTE — PROGRESS NOTE ADULT - SUBJECTIVE AND OBJECTIVE BOX
Minimal incisional pain. Gas in stoma bag.,  For stoma training and eventual discharge. Advance diet. DH

## 2022-03-31 NOTE — PROGRESS NOTE ADULT - PROBLEM SELECTOR PLAN 5
PMH anal cancer (dx 4/2021) s/p radiation and chemo (last 8/2021) complicated by an ulcer  > Pt completed radiation and chemo (8/2021) at [facility].  > f/u path results from rectal ulcer biopsy 3/25

## 2022-03-31 NOTE — PROGRESS NOTE ADULT - PROBLEM SELECTOR PLAN 4
given HIV and possible fistula was continued on empiric antibiotics, completed 7d course of zosyn on 3/25, Final bcx 3/17 neg  - Had fever on night of 3/25, Bcx ordered, neg thus far   - Pt recently completed 7d course of zosyn on 3/25 but zosyn reordered 3/26 for low grade temp --> appreciate ID f/u stable on zosyn for OR given HIV and possible fistula was continued on empiric antibiotics, completed 7d course of zosyn on 3/25, Final bcx 3/17 neg  - Had fever on night of 3/25, Bcx ordered, neg thus far   - Pt recently completed 7d course of zosyn on 3/25 but zosyn reordered 3/26 for low grade temp --> appreciate ID - d/c zosyn today

## 2022-03-31 NOTE — PROGRESS NOTE ADULT - PROBLEM SELECTOR PLAN 2
Patient reports difficulty with BM. Currently needs to have BM but is hesitant to go post operatively.   > Last BM 3/24, but now s/p diverting colostomy 3/31   > On relistor daily per outpatient records- 450mg PO qday in AM   > continue miralax bid, senna 2 tabs bid  > added lactulose 10grams PO q6hrs (Special instructions: please give until patient has BM)   > d/c metamucil

## 2022-03-31 NOTE — PROGRESS NOTE ADULT - PROBLEM SELECTOR PLAN 4
MRI a/p: A 2.9 cm left perianal abscess. No evidence of associated fistula.  > Colorectal surgery note reviewed- rectal ulcer found intraoperatively   > appreciate GI recs - consider sucralafate enemas if ongoing bleeding if patient can tolerate   > s/p diverting colostomy on 3/30   > On IV abx- appreciate ID recs   > pain management as above.

## 2022-03-31 NOTE — PROGRESS NOTE ADULT - PROBLEM SELECTOR PLAN 3
+BM 3/28 - c/w miralax tid, Senna improved on Miralax, senna  now with colostomy, taper down aldo/senna

## 2022-03-31 NOTE — PROGRESS NOTE ADULT - SUBJECTIVE AND OBJECTIVE BOX
[facility] Geriatrics and Palliative Care  Terri Roberts, Palliative Care Attending  Contact Info: Page [phone] (including Nights/Weekends), message on Microsoft Teams (Terri Roberts), or leave  at Palliative Office [phone] (non-urgent)     SUBJECTIVE AND OBJECTIVE: Patient seen this morning, c/o burning pain as his colostomy bag was leaking and was just cleaned up. He c/o pain and discomfort at site of colostomy.     INTERVAL HPI/OVERNIGHT EVENTS:  > 3/22: Over the past 24 hours, patient required PRNs of IV dilaudid 1.5mg x5, IV dilaudid 1mg x1, and ambien 5mg x1.   > 3/23: Over the past 24 hours, patient required PRNs of IV dilaudid 2mg x6.   > 3/25: s/p possible perianal abscess. Over the past 24 hours, patient required PRNs of IV dilaudid 2mg x5, ambien x1, xanax x1.   > 3/28: Over the past 24 hours, patient required PRNs of 3mg IV dilaudid x7.   > 3/31: s/p diverting colostomy 3/20. Over the past 24 hours, patient required PRNs of IV dilaudid 3mg x5.     DNR on chart:   Allergies    No Known Allergies    Intolerances    MEDICATIONS  (STANDING):  atorvastatin 10 milliGRAM(s) Oral at bedtime  bictegravir 50 mG/emtricitabine 200 mG/tenofovir alafenamide 25 mG (BIKTARVY) 1 Tablet(s) Oral daily  chlorhexidine 2% Cloths 1 Application(s) Topical daily  gabapentin 300 milliGRAM(s) Oral <User Schedule>  metoprolol succinate ER 50 milliGRAM(s) Oral daily  morphine ER Tablet 90 milliGRAM(s) Oral every 8 hours  OLANZapine 10 milliGRAM(s) Oral at bedtime  piperacillin/tazobactam IVPB.. 3.375 Gram(s) IV Intermittent every 8 hours  polyethylene glycol 3350 17 Gram(s) Oral <User Schedule>  senna 2 Tablet(s) Oral two times a day    MEDICATIONS  (PRN):  acetaminophen     Tablet .. 650 milliGRAM(s) Oral every 6 hours PRN Temp greater or equal to 38C (100.4F), Mild Pain (1 - 3)  ALPRAZolam 1 milliGRAM(s) Oral daily PRN anxiety  HYDROmorphone   Tablet 8 milliGRAM(s) Oral every 4 hours PRN Moderate Pain (4 - 6)  HYDROmorphone  Injectable 4 milliGRAM(s) IV Push every 3 hours PRN Severe Pain (7 - 10)  melatonin 3 milliGRAM(s) Oral at bedtime PRN Insomnia  zolpidem 5 milliGRAM(s) Oral at bedtime PRN Insomnia      ITEMS UNCHECKED ARE NOT PRESENT    PRESENT SYMPTOMS: [ ]Unable to obtain due to poor mentation   Source if other than patient:  [ ]Family   [ ]Team     Pain: [x ]yes [ ]no  QOL impact - difficulty ambulating   Location -    rectal pain, b/l leg pain                Aggravating factors - pain is constant, no pattern identified   Quality - sharp and stabbing   Radiation - rectal pain radiates to anterior groin   Timing- constant   Severity (0-10 scale): 8  Minimal acceptable level (0-10 scale): 2    Dyspnea:                           [ ]Mild [ ]Moderate [ ]Severe  Anxiety:                             [ ]Mild [ ]Moderate [ ]Severe  Fatigue:                             [ ]Mild [ ]Moderate [ ]Severe  Nausea:                             [ ]Mild [ ]Moderate [ ]Severe  Loss of appetite:              [ ]Mild [ ]Moderate [ ]Severe  Constipation:                    [ ]Mild [ ]Moderate [ ]Severe    PAIN AD Score:	  [email or web address] (Ctrl + left click to view)    Other Symptoms:  [ ]All other review of systems negative     Palliative Performance Status Version 2:   70      %      [email or web address]/files/[email or web address]_performance_scale_[email or web address]  PHYSICAL EXAM:  Vital Signs Last 24 Hrs  T(C): 37.3 (31 Mar 2022 13:49), Max: 37.3 (31 Mar 2022 13:49)  T(F): 99.1 (31 Mar 2022 13:49), Max: 99.1 (31 Mar 2022 13:49)  HR: 94 (31 Mar 2022 13:49) (90 - 114)  BP: 153/99 (31 Mar 2022 13:49) (106/76 - 174/97)  BP(mean): 121 (30 Mar 2022 19:00) (104 - 129)  RR: 18 (31 Mar 2022 13:49) (12 - 26)  SpO2: 97% (31 Mar 2022 13:49) (94% - 100%) I&O's Summary    30 Mar 2022 07:01  -  31 Mar 2022 07:00  --------------------------------------------------------  IN: 0 mL / OUT: 420 mL / NET: -420 mL       GENERAL:  [x ]Alert  [x ]Oriented x4   [ ]Lethargic  [ ]Cachexia  [ ]Unarousable  [x ]Verbal  [ ]Non-Verbal  Behavioral:   [ ] Anxiety  [ ] Delirium [ ] Agitation [x ] Other  HEENT:  [ x]Normal   [ ]Dry mouth   [ ]ET Tube/Trach  [ ]Oral lesions  PULMONARY:   [x ]Clear [ ]Tachypnea  [ ]Audible excessive secretions   [ ]Rhonchi        [ ]Right [ ]Left [ ]Bilateral  [ ]Crackles        [ ]Right [ ]Left [ ]Bilateral  [ ]Wheezing     [ ]Right [ ]Left [ ]Bilateral  [ ]Diminished breath sounds [ ]right [ ]left [ ]bilateral  CARDIOVASCULAR:    [ ]Regular [ ]Irregular [x ]Tachy  [ ]Vladimir [ ]Murmur [ ]Other  GASTROINTESTINAL: +colostomy   [ x]Soft  [ ]Distended   [ ]+BS  [ ]Non tender [ ]Tender  [ ]PEG [ ]OGT/ NGT  Last BM: 3/24  GENITOURINARY: please see flowsheets   [ ]Normal [ ] Incontinent   [ ]Oliguria/Anuria   [ ]Sahu  MUSCULOSKELETAL:   [ x]Normal   [ ]Weakness  [ ]Bed/Wheelchair bound [ ]Edema  NEUROLOGIC:   [x ]No focal deficits  [ ]Cognitive impairment  [ ]Dysphagia [ ]Dysarthria [ ]Paresis [ ]Other   SKIN: please see flowsheets   [ ]Normal    [ ]Rash  [ ]Pressure ulcer(s)       Present on admission [ ]y [ ]n    CRITICAL CARE:  [ ] Shock Present  [ ]Septic [ ]Cardiogenic [ ]Neurologic [ ]Hypovolemic  [ ]  Vasopressors [ ]  Inotropes   [ ]Respiratory failure present [ ][facility]  LABS:                        11.2   11.48 )-----------( 420      ( 31 Mar 2022 08:30 )             34.5   03-31    138  |  100  |  12  ----------------------------<  130<H>  3.8   |  26  |  0.73    Ca    9.3      31 Mar 2022 08:30  Phos  3.5     03-31  Mg     2.10     03-31    PT/INR - ( 30 Mar 2022 03:52 )   PT: 15.0 sec;   INR: 1.29 ratio         PTT - ( 30 Mar 2022 03:52 )  PTT:31.9 sec      RADIOLOGY & ADDITIONAL STUDIES: n/a     Protein Calorie Malnutrition Present: [ ]mild [ ]moderate [ ]severe [ ]underweight [ ]morbid obesity  https://www.andeal.org/vault/2440/web/files/ONC/Table_Clinical%20Characteristics%20to%20Document%20Malnutrition-White%20JV%20et%20al%202012.pdf    Height (cm): 180.3 (03-30-22 @ 13:35), 180.3 (11-26-21 @ 06:50), 180.3 (11-24-21 @ 10:02)  Weight (kg): 95.3 (03-30-22 @ 13:35), 101.6 (11-26-21 @ 06:50), 101.6 (11-24-21 @ 10:02)  BMI (kg/m2): 29.3 (03-30-22 @ 13:35), 31.3 (11-26-21 @ 06:50), 31.3 (11-24-21 @ 10:02)    [ ]PPSV2 < or = 30%  [ ]significant weight loss [ ]poor nutritional intake [ ]anasarca    [ ]Artificial Nutrition    REFERRALS:   [ ]Chaplaincy  [ ]Hospice  [ ]Child Life  [ ]Social Work  [ ]Case management [ ]Holistic Therapy

## 2022-03-31 NOTE — PROGRESS NOTE ADULT - PROBLEM SELECTOR PLAN 1
Patient follows with outpatient palliative team: Dr. Colon and Neena Borrego at [facility].   > At home, patient on MS Contin 60mg q8hrs and PO dilaudid 4-8mg q4h prn. He was receiving hyperbaric oxygen treatments outpatient.   > Continue MS Contin 90mg TID   > Transitioned moderate PRN to his home dilaudid of 8mg q4hr prn   > Increase PRNs to IV dilaudid 4mg q3hr prn severe pain   > added sitz baths   > Continue gabapentin to 300mg tid (9am, 1pm, 9pm)   > Pt may benefit from nerve block as outpatient for improved symptom control but he was receiving HBOT.

## 2022-03-31 NOTE — CHART NOTE - NSCHARTNOTEFT_GEN_A_CORE
Notified by RN that Pt noted w/ bleeding from Colostomy. Pt assessed at bedside, he continues to have 8/10 pain around colostomy site and is hungry but otherwise asymptomatic. Denies F/C, N/V, CP, SOB, Dizziness, diaphoresis. Colostomy bag without stool or air, dressing clean, man underneath patient w/ moderate amount of blood likely from colostomy given Dry blood noted on abdomen inferior to colostomy though no blood in colostomy bag itself. Site appropriately tender to palpation. Pt in NAD, VSS. Surgery made aware, will f/u recs. Will continue to monitor. Notified by RN that Pt noted w/ bleeding from Colostomy. Pt assessed at bedside, he continues to have 8/10 pain around colostomy site and is hungry but otherwise asymptomatic. Denies F/C, N/V, CP, SOB, Dizziness, diaphoresis. Colostomy bag without stool or air, dressing clean, man underneath patient w/ moderate amount of blood likely from colostomy given Dry blood noted on abdomen inferior to colostomy though no blood in colostomy bag itself. No active bleeding. Site appropriately tender to palpation. Pt in NAD, VSS. Surgery made aware, will f/u recs. Will continue to monitor. Notified by RN that Pt noted w/ bleeding from Colostomy. Pt assessed at bedside, he continues to have 8/10 pain around colostomy site and is hungry but otherwise asymptomatic. Denies F/C, N/V, CP, SOB, Dizziness, diaphoresis. Colostomy bag without stool or air, dressing clean, man underneath patient w/ moderate amount of blood likely from colostomy given Dry blood noted on abdomen inferior to colostomy though no blood in colostomy bag itself. Dressing incompletely adheres to abdomen. No active bleeding. Site appropriately tender to palpation. Pt in NAD, VSS. Surgery made aware, will f/u recs. Will continue to monitor.

## 2022-04-01 ENCOUNTER — TRANSCRIPTION ENCOUNTER (OUTPATIENT)
Age: 57
End: 2022-04-01

## 2022-04-01 ENCOUNTER — APPOINTMENT (OUTPATIENT)
Dept: HYPERBARIC MEDICINE | Facility: CLINIC | Age: 57
End: 2022-04-01

## 2022-04-01 LAB
ANION GAP SERPL CALC-SCNC: 12 MMOL/L — SIGNIFICANT CHANGE UP (ref 7–14)
BASOPHILS # BLD AUTO: 0.04 K/UL — SIGNIFICANT CHANGE UP (ref 0–0.2)
BASOPHILS NFR BLD AUTO: 0.4 % — SIGNIFICANT CHANGE UP (ref 0–2)
BUN SERPL-MCNC: 10 MG/DL — SIGNIFICANT CHANGE UP (ref 7–23)
CALCIUM SERPL-MCNC: 9.2 MG/DL — SIGNIFICANT CHANGE UP (ref 8.4–10.5)
CHLORIDE SERPL-SCNC: 99 MMOL/L — SIGNIFICANT CHANGE UP (ref 98–107)
CO2 SERPL-SCNC: 25 MMOL/L — SIGNIFICANT CHANGE UP (ref 22–31)
CREAT SERPL-MCNC: 0.79 MG/DL — SIGNIFICANT CHANGE UP (ref 0.5–1.3)
EGFR: 105 ML/MIN/1.73M2 — SIGNIFICANT CHANGE UP
EOSINOPHIL # BLD AUTO: 0.02 K/UL — SIGNIFICANT CHANGE UP (ref 0–0.5)
EOSINOPHIL NFR BLD AUTO: 0.2 % — SIGNIFICANT CHANGE UP (ref 0–6)
GLUCOSE SERPL-MCNC: 101 MG/DL — HIGH (ref 70–99)
HCT VFR BLD CALC: 34.7 % — LOW (ref 39–50)
HGB BLD-MCNC: 11.3 G/DL — LOW (ref 13–17)
IANC: 7 K/UL — SIGNIFICANT CHANGE UP (ref 1.8–7.4)
IMM GRANULOCYTES NFR BLD AUTO: 0.5 % — SIGNIFICANT CHANGE UP (ref 0–1.5)
LYMPHOCYTES # BLD AUTO: 2.1 K/UL — SIGNIFICANT CHANGE UP (ref 1–3.3)
LYMPHOCYTES # BLD AUTO: 20.1 % — SIGNIFICANT CHANGE UP (ref 13–44)
MAGNESIUM SERPL-MCNC: 2 MG/DL — SIGNIFICANT CHANGE UP (ref 1.6–2.6)
MCHC RBC-ENTMCNC: 28.2 PG — SIGNIFICANT CHANGE UP (ref 27–34)
MCHC RBC-ENTMCNC: 32.6 GM/DL — SIGNIFICANT CHANGE UP (ref 32–36)
MCV RBC AUTO: 86.5 FL — SIGNIFICANT CHANGE UP (ref 80–100)
MONOCYTES # BLD AUTO: 1.22 K/UL — HIGH (ref 0–0.9)
MONOCYTES NFR BLD AUTO: 11.7 % — SIGNIFICANT CHANGE UP (ref 2–14)
NEUTROPHILS # BLD AUTO: 7 K/UL — SIGNIFICANT CHANGE UP (ref 1.8–7.4)
NEUTROPHILS NFR BLD AUTO: 67.1 % — SIGNIFICANT CHANGE UP (ref 43–77)
NRBC # BLD: 0 /100 WBCS — SIGNIFICANT CHANGE UP
NRBC # FLD: 0 K/UL — SIGNIFICANT CHANGE UP
PHOSPHATE SERPL-MCNC: 3.6 MG/DL — SIGNIFICANT CHANGE UP (ref 2.5–4.5)
PLATELET # BLD AUTO: 408 K/UL — HIGH (ref 150–400)
POTASSIUM SERPL-MCNC: 3.7 MMOL/L — SIGNIFICANT CHANGE UP (ref 3.5–5.3)
POTASSIUM SERPL-SCNC: 3.7 MMOL/L — SIGNIFICANT CHANGE UP (ref 3.5–5.3)
RBC # BLD: 4.01 M/UL — LOW (ref 4.2–5.8)
RBC # FLD: 13.9 % — SIGNIFICANT CHANGE UP (ref 10.3–14.5)
SODIUM SERPL-SCNC: 136 MMOL/L — SIGNIFICANT CHANGE UP (ref 135–145)
WBC # BLD: 10.43 K/UL — SIGNIFICANT CHANGE UP (ref 3.8–10.5)
WBC # FLD AUTO: 10.43 K/UL — SIGNIFICANT CHANGE UP (ref 3.8–10.5)

## 2022-04-01 PROCEDURE — 99233 SBSQ HOSP IP/OBS HIGH 50: CPT

## 2022-04-01 RX ORDER — HYDROMORPHONE HYDROCHLORIDE 2 MG/ML
1 INJECTION INTRAMUSCULAR; INTRAVENOUS; SUBCUTANEOUS
Qty: 42 | Refills: 0
Start: 2022-04-01 | End: 2022-04-07

## 2022-04-01 RX ORDER — HYDROMORPHONE HYDROCHLORIDE 2 MG/ML
4 INJECTION INTRAMUSCULAR; INTRAVENOUS; SUBCUTANEOUS
Refills: 0 | Status: DISCONTINUED | OUTPATIENT
Start: 2022-04-01 | End: 2022-04-02

## 2022-04-01 RX ORDER — HYDROMORPHONE HYDROCHLORIDE 2 MG/ML
4 INJECTION INTRAMUSCULAR; INTRAVENOUS; SUBCUTANEOUS EVERY 4 HOURS
Refills: 0 | Status: DISCONTINUED | OUTPATIENT
Start: 2022-04-01 | End: 2022-04-02

## 2022-04-01 RX ORDER — HYDROMORPHONE HYDROCHLORIDE 2 MG/ML
8 INJECTION INTRAMUSCULAR; INTRAVENOUS; SUBCUTANEOUS EVERY 4 HOURS
Refills: 0 | Status: DISCONTINUED | OUTPATIENT
Start: 2022-04-01 | End: 2022-04-02

## 2022-04-01 RX ORDER — MORPHINE SULFATE 50 MG/1
3 CAPSULE, EXTENDED RELEASE ORAL
Qty: 90 | Refills: 0
Start: 2022-04-01 | End: 2022-04-10

## 2022-04-01 RX ADMIN — CHLORHEXIDINE GLUCONATE 1 APPLICATION(S): 213 SOLUTION TOPICAL at 11:01

## 2022-04-01 RX ADMIN — BICTEGRAVIR SODIUM, EMTRICITABINE, AND TENOFOVIR ALAFENAMIDE FUMARATE 1 TABLET(S): 30; 120; 15 TABLET ORAL at 11:00

## 2022-04-01 RX ADMIN — MORPHINE SULFATE 90 MILLIGRAM(S): 50 CAPSULE, EXTENDED RELEASE ORAL at 07:39

## 2022-04-01 RX ADMIN — ZOLPIDEM TARTRATE 5 MILLIGRAM(S): 10 TABLET ORAL at 21:54

## 2022-04-01 RX ADMIN — HYDROMORPHONE HYDROCHLORIDE 8 MILLIGRAM(S): 2 INJECTION INTRAMUSCULAR; INTRAVENOUS; SUBCUTANEOUS at 22:02

## 2022-04-01 RX ADMIN — GABAPENTIN 300 MILLIGRAM(S): 400 CAPSULE ORAL at 08:59

## 2022-04-01 RX ADMIN — HYDROMORPHONE HYDROCHLORIDE 8 MILLIGRAM(S): 2 INJECTION INTRAMUSCULAR; INTRAVENOUS; SUBCUTANEOUS at 21:47

## 2022-04-01 RX ADMIN — ATORVASTATIN CALCIUM 10 MILLIGRAM(S): 80 TABLET, FILM COATED ORAL at 21:48

## 2022-04-01 RX ADMIN — HYDROMORPHONE HYDROCHLORIDE 8 MILLIGRAM(S): 2 INJECTION INTRAMUSCULAR; INTRAVENOUS; SUBCUTANEOUS at 06:25

## 2022-04-01 RX ADMIN — ENOXAPARIN SODIUM 40 MILLIGRAM(S): 100 INJECTION SUBCUTANEOUS at 21:47

## 2022-04-01 RX ADMIN — SENNA PLUS 2 TABLET(S): 8.6 TABLET ORAL at 21:48

## 2022-04-01 RX ADMIN — GABAPENTIN 300 MILLIGRAM(S): 400 CAPSULE ORAL at 21:47

## 2022-04-01 RX ADMIN — HYDROMORPHONE HYDROCHLORIDE 8 MILLIGRAM(S): 2 INJECTION INTRAMUSCULAR; INTRAVENOUS; SUBCUTANEOUS at 05:55

## 2022-04-01 RX ADMIN — HYDROMORPHONE HYDROCHLORIDE 4 MILLIGRAM(S): 2 INJECTION INTRAMUSCULAR; INTRAVENOUS; SUBCUTANEOUS at 09:15

## 2022-04-01 RX ADMIN — Medication 50 MILLIGRAM(S): at 06:14

## 2022-04-01 RX ADMIN — HYDROMORPHONE HYDROCHLORIDE 4 MILLIGRAM(S): 2 INJECTION INTRAMUSCULAR; INTRAVENOUS; SUBCUTANEOUS at 03:43

## 2022-04-01 RX ADMIN — POLYETHYLENE GLYCOL 3350 17 GRAM(S): 17 POWDER, FOR SOLUTION ORAL at 05:55

## 2022-04-01 RX ADMIN — HYDROMORPHONE HYDROCHLORIDE 4 MILLIGRAM(S): 2 INJECTION INTRAMUSCULAR; INTRAVENOUS; SUBCUTANEOUS at 09:00

## 2022-04-01 RX ADMIN — HYDROMORPHONE HYDROCHLORIDE 4 MILLIGRAM(S): 2 INJECTION INTRAMUSCULAR; INTRAVENOUS; SUBCUTANEOUS at 03:28

## 2022-04-01 RX ADMIN — GABAPENTIN 300 MILLIGRAM(S): 400 CAPSULE ORAL at 12:09

## 2022-04-01 RX ADMIN — HYDROMORPHONE HYDROCHLORIDE 4 MILLIGRAM(S): 2 INJECTION INTRAMUSCULAR; INTRAVENOUS; SUBCUTANEOUS at 12:09

## 2022-04-01 RX ADMIN — MORPHINE SULFATE 90 MILLIGRAM(S): 50 CAPSULE, EXTENDED RELEASE ORAL at 23:50

## 2022-04-01 RX ADMIN — OLANZAPINE 10 MILLIGRAM(S): 15 TABLET, FILM COATED ORAL at 21:48

## 2022-04-01 RX ADMIN — HYDROMORPHONE HYDROCHLORIDE 8 MILLIGRAM(S): 2 INJECTION INTRAMUSCULAR; INTRAVENOUS; SUBCUTANEOUS at 18:04

## 2022-04-01 RX ADMIN — HYDROMORPHONE HYDROCHLORIDE 8 MILLIGRAM(S): 2 INJECTION INTRAMUSCULAR; INTRAVENOUS; SUBCUTANEOUS at 17:04

## 2022-04-01 RX ADMIN — MORPHINE SULFATE 90 MILLIGRAM(S): 50 CAPSULE, EXTENDED RELEASE ORAL at 07:09

## 2022-04-01 RX ADMIN — MORPHINE SULFATE 90 MILLIGRAM(S): 50 CAPSULE, EXTENDED RELEASE ORAL at 14:54

## 2022-04-01 RX ADMIN — HYDROMORPHONE HYDROCHLORIDE 4 MILLIGRAM(S): 2 INJECTION INTRAMUSCULAR; INTRAVENOUS; SUBCUTANEOUS at 12:23

## 2022-04-01 NOTE — PROGRESS NOTE ADULT - SUBJECTIVE AND OBJECTIVE BOX
[facility] Geriatrics and Palliative Care  Terri Roberts, Palliative Care Attending  Contact Info: Page [phone] (including Nights/Weekends), message on Microsoft Teams (Terri Roberts), or leave  at Palliative Office [phone] (non-urgent)     SUBJECTIVE AND OBJECTIVE: Patient seen this morning lying in bed, no acute distress, stated pain is better controlled. Encouraged patient to take PO meds.     INTERVAL HPI/OVERNIGHT EVENTS:  > 3/22: Over the past 24 hours, patient required PRNs of IV dilaudid 1.5mg x5, IV dilaudid 1mg x1, and ambien 5mg x1.   > 3/23: Over the past 24 hours, patient required PRNs of IV dilaudid 2mg x6.   > 3/25: s/p possible perianal abscess. Over the past 24 hours, patient required PRNs of IV dilaudid 2mg x5, ambien x1, xanax x1.   > 3/28: Over the past 24 hours, patient required PRNs of 3mg IV dilaudid x7.   > 3/31: s/p diverting colostomy 3/20. Over the past 24 hours, patient required PRNs of IV dilaudid 3mg x5.   > 4/1: Over the past 24 hours, patient required PRNs of IV dilaudid 4mg x4 and 3mg IV dilaudid x1.     Allergies    No Known Allergies    Intolerances    MEDICATIONS  (STANDING):  atorvastatin 10 milliGRAM(s) Oral at bedtime  bictegravir 50 mG/emtricitabine 200 mG/tenofovir alafenamide 25 mG (BIKTARVY) 1 Tablet(s) Oral daily  chlorhexidine 2% Cloths 1 Application(s) Topical daily  enoxaparin Injectable 40 milliGRAM(s) SubCutaneous every 24 hours  gabapentin 300 milliGRAM(s) Oral <User Schedule>  metoprolol succinate ER 50 milliGRAM(s) Oral daily  morphine ER Tablet 90 milliGRAM(s) Oral every 8 hours  OLANZapine 10 milliGRAM(s) Oral at bedtime  polyethylene glycol 3350 17 Gram(s) Oral two times a day  senna 2 Tablet(s) Oral at bedtime    MEDICATIONS  (PRN):  acetaminophen     Tablet .. 650 milliGRAM(s) Oral every 6 hours PRN Temp greater or equal to 38C (100.4F), Mild Pain (1 - 3)  ALPRAZolam 1 milliGRAM(s) Oral daily PRN anxiety  HYDROmorphone   Tablet 8 milliGRAM(s) Oral every 4 hours PRN Severe Pain (7 - 10)  HYDROmorphone   Tablet 4 milliGRAM(s) Oral every 4 hours PRN Moderate Pain (4 - 6)  HYDROmorphone  Injectable 4 milliGRAM(s) IV Push every 3 hours PRN severe refractory pain  melatonin 3 milliGRAM(s) Oral at bedtime PRN Insomnia  zolpidem 5 milliGRAM(s) Oral at bedtime PRN Insomnia      ITEMS UNCHECKED ARE NOT PRESENT    PRESENT SYMPTOMS: [ ]Unable to obtain due to poor mentation   Source if other than patient:  [ ]Family   [ ]Team     Pain: [x ]yes [ ]no  QOL impact - difficulty ambulating   Location -    rectal pain, b/l leg pain                Aggravating factors - pain is constant, no pattern identified   Quality - sharp and stabbing   Radiation - rectal pain radiates to anterior groin   Timing- constant   Severity (0-10 scale): 8  Minimal acceptable level (0-10 scale): 0    Dyspnea:                           [ ]Mild [ ]Moderate [ ]Severe  Anxiety:                             [ ]Mild [ ]Moderate [ ]Severe  Fatigue:                             [ ]Mild [ ]Moderate [ ]Severe  Nausea:                             [ ]Mild [ ]Moderate [ ]Severe  Loss of appetite:              [ ]Mild [ ]Moderate [ ]Severe  Constipation:                    [ ]Mild [ ]Moderate [ ]Severe    PAIN AD Score:	  [email or web address]/[email or web address] (Ctrl + left click to view)    Other Symptoms:  [ x]All other review of systems negative     Palliative Performance Status Version 2:    70     %      http://npcrc.org/files/news/palliative_performance_scale_ppsv2.pdf  PHYSICAL EXAM:  Vital Signs Last 24 Hrs  T(C): 37 (01 Apr 2022 12:09), Max: 37.4 (31 Mar 2022 21:52)  T(F): 98.6 (01 Apr 2022 12:09), Max: 99.3 (31 Mar 2022 21:52)  HR: 101 (01 Apr 2022 12:09) (89 - 103)  BP: 141/98 (01 Apr 2022 12:09) (118/79 - 157/96)  BP(mean): --  RR: 18 (01 Apr 2022 12:09) (16 - 18)  SpO2: 96% (01 Apr 2022 12:09) (95% - 100%) I&O's Summary    31 Mar 2022 07:01  -  01 Apr 2022 07:00  --------------------------------------------------------  IN: 480 mL / OUT: 800 mL / NET: -320 mL       GENERAL:  [x ]Alert  [x ]Oriented x4   [ ]Lethargic  [ ]Cachexia  [ ]Unarousable  [x ]Verbal  [ ]Non-Verbal  Behavioral:   [ ] Anxiety  [ ] Delirium [ ] Agitation [x ] Other  HEENT:  [ x]Normal   [ ]Dry mouth   [ ]ET Tube/Trach  [ ]Oral lesions  PULMONARY:   [x ]Clear [ ]Tachypnea  [ ]Audible excessive secretions   [ ]Rhonchi        [ ]Right [ ]Left [ ]Bilateral  [ ]Crackles        [ ]Right [ ]Left [ ]Bilateral  [ ]Wheezing     [ ]Right [ ]Left [ ]Bilateral  [ ]Diminished breath sounds [ ]right [ ]left [ ]bilateral  CARDIOVASCULAR:    [ ]Regular [ ]Irregular [x ]Tachy  [ ]Vladimir [ ]Murmur [ ]Other  GASTROINTESTINAL: +colostomy   [ x]Soft  [ ]Distended   [ ]+BS  [ ]Non tender [ ]Tender  [ ]PEG [ ]OGT/ NGT  Last BM: +colostomy bag in place with stool noted   GENITOURINARY: please see flowsheets   [ ]Normal [ ] Incontinent   [ ]Oliguria/Anuria   [ ]Sahu  MUSCULOSKELETAL:   [ x]Normal   [ ]Weakness  [ ]Bed/Wheelchair bound [ ]Edema  NEUROLOGIC:   [x ]No focal deficits  [ ]Cognitive impairment  [ ]Dysphagia [ ]Dysarthria [ ]Paresis [ ]Other   SKIN: please see flowsheets   [ ]Normal    [ ]Rash  [ ]Pressure ulcer(s)       Present on admission [ ]y [ ]n    CRITICAL CARE:  [ ] Shock Present  [ ]Septic [ ]Cardiogenic [ ]Neurologic [ ]Hypovolemic  [ ]  Vasopressors [ ]  Inotropes   [ ]Respiratory failure present [ ][facility]    LABS:                        11.3   10.43 )-----------( 408      ( 01 Apr 2022 07:45 )             34.7   04-01    136  |  99  |  10  ----------------------------<  101<H>  3.7   |  25  |  0.79    Ca    9.2      01 Apr 2022 07:45  Phos  3.6     04-01  Mg     2.00     04-01      RADIOLOGY & ADDITIONAL STUDIES: n/a     Protein Calorie Malnutrition Present: [ ]mild [ ]moderate [ ]severe [ ]underweight [ ]morbid obesity  https://www.andeal.org/vault/2440/web/files/ONC/Table_Clinical%20Characteristics%20to%20Document%20Malnutrition-White%20JV%20et%20al%202012.pdf    Height (cm): 180.3 (03-30-22 @ 13:35), 180.3 (11-26-21 @ 06:50), 180.3 (11-24-21 @ 10:02)  Weight (kg): 95.3 (03-30-22 @ 13:35), 101.6 (11-26-21 @ 06:50), 101.6 (11-24-21 @ 10:02)  BMI (kg/m2): 29.3 (03-30-22 @ 13:35), 31.3 (11-26-21 @ 06:50), 31.3 (11-24-21 @ 10:02)    [ ]PPSV2 < or = 30%  [ ]significant weight loss [ ]poor nutritional intake [ ]anasarca    [ ]Artificial Nutrition    REFERRALS:   [ ]Chaplaincy  [ ]Hospice  [ ]Child Life  [ ]Social Work  [ ]Case management [ ]Holistic Therapy

## 2022-04-01 NOTE — PROGRESS NOTE ADULT - PROBLEM SELECTOR PLAN 1
acute on chronic rectal pain associated w/ hematochezia and constipation, s/p multiple radiation treatments to the area due to h/o anal cancer and prostate cancer; chronically on MS contin 60mg q8 and Dilaudid PO 8mg q4, which has not been adequately controlling the pain  - MRI perianal abscess- OR 3/25 severe induration seen circumferentially with ulcerated mucosa and recessed cavity on left side inferior to true lumen. No definite abscess seen therefore no drainage or sphincterotomy was performed.   3/30 s/p lap colostomy --> post op management per surgery, advance diet as tolerates  - d/w surgery team - ok to start lovenox for DVT prophylaxis  - Palliative consult helping optimize pain management

## 2022-04-01 NOTE — CHART NOTE - NSCHARTNOTEFT_GEN_A_CORE
Patient tolerating regular diet without N/V and has output in ostomy, bowel function. From surgical standpoint patient may be discharged with outpatient follow up with Dr. Avilez in two weeks.  Patient has confirmed he received ostomy education postoperatively.    Discussed with team and Dr. Avilez.    Job NICK Team Surgery  [id number]

## 2022-04-01 NOTE — PROGRESS NOTE ADULT - PROBLEM SELECTOR PLAN 3
Patient ambulating out of bed better. Pain improved.   > Please assist with ADLs  > fall precautions.  > pain control as above

## 2022-04-01 NOTE — PROGRESS NOTE ADULT - SUBJECTIVE AND OBJECTIVE BOX
A Team Progress Note  [id number]    Subjective:   Patient seen at bedside this AM. Patient reports no solid food yet but has had gas in ostomy bag as well as stool. No n/v. No f/n/c. Pain is stable but improving at the surgical site.    Objective:  Vital Signs  T(C): 37.3 (04-01 @ 05:50), Max: 37.4 (03-31 @ 21:52)  HR: 100 (04-01 @ 05:50) (89 - 101)  BP: 136/96 (04-01 @ 05:50) (118/79 - 157/96)  RR: 18 (04-01 @ 05:50) (16 - 18)  SpO2: 96% (04-01 @ 05:50) (96% - 100%)  03-31-22 @ 07:01  -  04-01-22 @ 07:00  --------------------------------------------------------  IN:  Total IN: 0 mL    OUT:    Voided (mL): 800 mL  Total OUT: 800 mL    Total NET: -800 mL          I&O's Detail    03-31-22 @ 07:01  -  04-01-22 @ 07:00  --------------------------------------------------------  IN: 480 mL / OUT: 800 mL / NET: -320 mL        Physical Exam:  GEN: resting in bed comfortably in NAD  RESP: no increased WOB  ABD: soft, non-distended, tender near ostomy bag, gas in ostomy bag  EXTR: warm, well-perfused    Labs:                        11.2   11.48 )-----------( 420      ( 31 Mar 2022 08:30 )             34.5   03-31    138  |  100  |  12  ----------------------------<  130<H>  3.8   |  26  |  0.73    Ca    9.3      31 Mar 2022 08:30  Phos  3.5     03-31  Mg     2.10     03-31      CAPILLARY BLOOD GLUCOSE          Medications:   MEDICATIONS  (STANDING):  atorvastatin 10 milliGRAM(s) Oral at bedtime  bictegravir 50 mG/emtricitabine 200 mG/tenofovir alafenamide 25 mG (BIKTARVY) 1 Tablet(s) Oral daily  chlorhexidine 2% Cloths 1 Application(s) Topical daily  enoxaparin Injectable 40 milliGRAM(s) SubCutaneous every 24 hours  gabapentin 300 milliGRAM(s) Oral <User Schedule>  metoprolol succinate ER 50 milliGRAM(s) Oral daily  morphine ER Tablet 90 milliGRAM(s) Oral every 8 hours  OLANZapine 10 milliGRAM(s) Oral at bedtime  polyethylene glycol 3350 17 Gram(s) Oral two times a day  senna 2 Tablet(s) Oral at bedtime    MEDICATIONS  (PRN):  acetaminophen     Tablet .. 650 milliGRAM(s) Oral every 6 hours PRN Temp greater or equal to 38C (100.4F), Mild Pain (1 - 3)  ALPRAZolam 1 milliGRAM(s) Oral daily PRN anxiety  HYDROmorphone   Tablet 8 milliGRAM(s) Oral every 4 hours PRN Moderate Pain (4 - 6)  HYDROmorphone  Injectable 4 milliGRAM(s) IV Push every 3 hours PRN Severe Pain (7 - 10)  melatonin 3 milliGRAM(s) Oral at bedtime PRN Insomnia  zolpidem 5 milliGRAM(s) Oral at bedtime PRN Insomnia      Imaging:

## 2022-04-01 NOTE — CHART NOTE - NSCHARTNOTESELECT_GEN_ALL_CORE
Event Note
GI/Event Note
ISTOP
Nutrition Services
Post Operative Check
Surgery Update
postop check/Event Note
preop

## 2022-04-01 NOTE — PROGRESS NOTE ADULT - SUBJECTIVE AND OBJECTIVE BOX
Patient is a 56y old  Male who presents with a chief complaint of Rectal pain and bleeding, difficulty ambulating (01 Apr 2022 12:43)      INTERVAL HPI/OVERNIGHT EVENTS:  Seen by me this morning, doing well, wife at bedside. Having some pain. Completed zosyn yesterday per ID recs.    Review of Systems: 12 point review of systems otherwise negative    MEDICATIONS  (STANDING):  atorvastatin 10 milliGRAM(s) Oral at bedtime  bictegravir 50 mG/emtricitabine 200 mG/tenofovir alafenamide 25 mG (BIKTARVY) 1 Tablet(s) Oral daily  chlorhexidine 2% Cloths 1 Application(s) Topical daily  enoxaparin Injectable 40 milliGRAM(s) SubCutaneous every 24 hours  gabapentin 300 milliGRAM(s) Oral <User Schedule>  metoprolol succinate ER 50 milliGRAM(s) Oral daily  morphine ER Tablet 90 milliGRAM(s) Oral every 8 hours  OLANZapine 10 milliGRAM(s) Oral at bedtime  polyethylene glycol 3350 17 Gram(s) Oral two times a day  senna 2 Tablet(s) Oral at bedtime    MEDICATIONS  (PRN):  acetaminophen     Tablet .. 650 milliGRAM(s) Oral every 6 hours PRN Temp greater or equal to 38C (100.4F), Mild Pain (1 - 3)  ALPRAZolam 1 milliGRAM(s) Oral daily PRN anxiety  HYDROmorphone   Tablet 8 milliGRAM(s) Oral every 4 hours PRN Severe Pain (7 - 10)  HYDROmorphone   Tablet 4 milliGRAM(s) Oral every 4 hours PRN Moderate Pain (4 - 6)  HYDROmorphone  Injectable 4 milliGRAM(s) IV Push every 3 hours PRN severe refractory pain  melatonin 3 milliGRAM(s) Oral at bedtime PRN Insomnia  zolpidem 5 milliGRAM(s) Oral at bedtime PRN Insomnia      Allergies    No Known Allergies    Intolerances          Vital Signs Last 24 Hrs  T(C): 37.3 (01 Apr 2022 17:35), Max: 37.4 (31 Mar 2022 21:52)  T(F): 99.1 (01 Apr 2022 17:35), Max: 99.3 (31 Mar 2022 21:52)  HR: 103 (01 Apr 2022 17:35) (93 - 103)  BP: 145/92 (01 Apr 2022 17:35) (118/89 - 145/92)  BP(mean): --  RR: 18 (01 Apr 2022 17:35) (16 - 18)  SpO2: 96% (01 Apr 2022 17:35) (95% - 100%)  CAPILLARY BLOOD GLUCOSE          03-31 @ 07:01  -  04-01 @ 07:00  --------------------------------------------------------  IN: 480 mL / OUT: 800 mL / NET: -320 mL        Physical Exam:    Daily     Daily   General:  Well appearing, NAD, not cachetic  HEENT:  Nonicteric, PERRLA  CV:  RRR, no murmur, no JVD  Lungs:  CTA B/L, no wheezes, rales, rhonchi  Abdomen:  Soft, non-tender, no distended, positive BS, functioning colostomy  Extremities:  2+ pulses, no c/c, no edema  Skin:  Warm and dry, no rashes  :  No davis  Neuro:  AAOx3, non-focal, CN II-XII grossly intact  No Restraints    LABS:                        11.3   10.43 )-----------( 408      ( 01 Apr 2022 07:45 )             34.7     04-01    136  |  99  |  10  ----------------------------<  101<H>  3.7   |  25  |  0.79    Ca    9.2      01 Apr 2022 07:45  Phos  3.6     04-01  Mg     2.00     04-01              RADIOLOGY & ADDITIONAL TESTS:  Reviewed by me

## 2022-04-01 NOTE — ADVANCED PRACTICE NURSE CONSULT - RECOMMEDATIONS
Supplies for home at bedside.     Please contact Wound/Ostomy Care Service Line if we can be of further assistance (ext [phone]). 
Please reconsult post op for colostomy education.     Please contact Wound/Ostomy Care Service Line if we can be of further assistance (ext [phone]). 
Supplies for home provided at bedside:   Stoma powder- item #[id number]  for home use  Liquid barrier film  for home use  Skin barrier ring- item # [id number]  One piece drainable pouch- item #[id number]    Appointment scheduled for tomorrow with wife.   Please contact Wound/Ostomy Care Service Line if we can be of further assistance (ext [phone]).

## 2022-04-01 NOTE — PROGRESS NOTE ADULT - PROBLEM SELECTOR PLAN 6
Thank you for allowing us to participate in your patient's care. We will continue to follow with you. Please page [phone] for any q's or c's.     Terri Roberts D.O.   Palliative Medicine.

## 2022-04-01 NOTE — PROGRESS NOTE ADULT - PROBLEM SELECTOR PLAN 4
given HIV and possible fistula was continued on empiric antibiotics, completed 7d course of zosyn on 3/25, Final bcx 3/17 neg  - Had fever on night of 3/25, Bcx ordered, neg thus far   - Pt recently completed 7d course of zosyn on 3/25 but zosyn reordered 3/26 for low grade temp --> appreciate ID - d/c zosyn today

## 2022-04-01 NOTE — PROGRESS NOTE ADULT - PROBLEM SELECTOR PLAN 2
Patient now s/p colostomy 3/31, doing well. Stool noted in bag.   > continue miralax bid, senna 2 tabs bid while patient is on opioids

## 2022-04-01 NOTE — PROGRESS NOTE ADULT - PROBLEM SELECTOR PLAN 1
Patient follows with outpatient palliative team: Dr. Colon and Neena Borrego at [facility].   > At home, patient on MS Contin 60mg q8hrs and PO dilaudid 4-8mg q4h prn. He was receiving hyperbaric oxygen treatments outpatient.   > Continue MS Contin 90mg TID   > Transitioned moderate PRN to 4mg PO Dilaudid q4hr, severe PRN to 8mg PO dilaudid q4hhr, severe refractory pain 4mg IV dilaudid q3hr. Patient encouraged to take PO meds.   > ANTICIPATORY DISCHARGE MEDS: MS CONTIN 90MG TID, DILAUDID 4mg PO q4hr prn moderate to severe pain. PLEASE SEND TO [facility] IF BEING DISCHARGED   > added sitz baths   > Continue gabapentin to 300mg tid (9am, 1pm, 9pm)   > Pt may benefit from nerve block as outpatient for improved symptom control but he was receiving HBOT.

## 2022-04-01 NOTE — PROGRESS NOTE ADULT - PROBLEM SELECTOR PLAN 4
MRI a/p: A 2.9 cm left perianal abscess. No evidence of associated fistula.  > Colorectal surgery note reviewed- rectal ulcer found intraoperatively   > appreciate GI recs - consider sucralafate enemas if ongoing bleeding if patient can tolerate   > s/p diverting colostomy on 3/30   > completed course of IV abx   > pain management as above.

## 2022-04-01 NOTE — PROGRESS NOTE ADULT - PROBLEM SELECTOR PLAN 5
PMH anal cancer (dx 4/2021) s/p radiation and chemo (last 8/2021) complicated by an ulcer  > Pt completed radiation and chemo (8/2021) at [facility].  > f/u path results from rectal ulcer biopsy 3/25

## 2022-04-01 NOTE — DISCHARGE NOTE NURSING/CASE MANAGEMENT/SOCIAL WORK - NSDCPEFALRISK_GEN_ALL_CORE
For information on Fall & Injury Prevention, visit: [email or web address] OR  [email or web address] OR  https://www.cdc.gov/steadi/patient.html

## 2022-04-01 NOTE — DISCHARGE NOTE NURSING/CASE MANAGEMENT/SOCIAL WORK - FLU SEASON?
Yes... No pt presented for eval of a left sided headache in setting of hx of migraines and 12 week pregnant. neuro exam wnl, pt felt better after meds. pt ambulated in ed, eager for dc

## 2022-04-01 NOTE — ADVANCED PRACTICE NURSE CONSULT - ASSESSMENT
Patient emptied pouch into toilet independently. Patient able to change pouch with minimal reinforcement, wife included in teaching.  Removed ostomy pouch, cleansed skin with water, patted dry. Reinforced patient how to properly assess stoma viability and peristomal skin. Patient actively participated in stoma measurement, creating oval template, cutting waffer, applying barrier ring, applying pouch. Intimacy and traveling with an ostomy reviewed.     Stoma size: 3/4" x 1 3/4" See A&I flowsheet for full stoma assessment details.

## 2022-04-01 NOTE — ADVANCED PRACTICE NURSE CONSULT - REASON FOR CONSULT
Patient seen for follow up colostomy teaching, wife, Trinity, at bedside participating in care. 
Patient seen for pre-op marking. Patient with H/O HTN< HIV on Biktarvy, last VL:UD and CD4>900, prostate ca 2016 s/p radiation, anal cancer (dx 4/2021) s/p radiation and chemotherapy (last 8/2021) complicated by an ulcer,  p/w rectal bleeding and pain, also b/l inguinal pain scheduled for colostomy. 
Patient seen for initial colostomy teaching.

## 2022-04-01 NOTE — DISCHARGE NOTE NURSING/CASE MANAGEMENT/SOCIAL WORK - PATIENT PORTAL LINK FT
You can access the FollowMyHealth Patient Portal offered by [facility] by registering at the following website: [email or web address]. By joining [facility]’s FollowMyHealth portal, you will also be able to view your health information using other applications (apps) compatible with our system.

## 2022-04-01 NOTE — PROGRESS NOTE ADULT - ASSESSMENT
56yM s/p eua showing no abscess but showing cavity inferior to true lumen, induration, ulceration. s/p 3/30 sigmoid colostomy creation    Recommendations:  - abx per ID  - regular diet  - monitor UOP  - mointor ostomy output  - OOB      - Please page [phone] w/ any questions   56yM s/p eua showing no abscess but showing cavity inferior to true lumen, induration, ulceration. s/p 3/30 sigmoid colostomy creation    Recommendations:  - Continue LRD  - Care per primary team  - No contraindication to discharge with pain regimen from surgical perspective  - Please have patient follow up with Dr. Avilez as an outpatient    - Please page [phone] w/ any questions

## 2022-04-02 VITALS
SYSTOLIC BLOOD PRESSURE: 139 MMHG | HEART RATE: 114 BPM | DIASTOLIC BLOOD PRESSURE: 91 MMHG | TEMPERATURE: 98 F | OXYGEN SATURATION: 96 % | RESPIRATION RATE: 18 BRPM

## 2022-04-02 LAB
ANION GAP SERPL CALC-SCNC: 11 MMOL/L — SIGNIFICANT CHANGE UP (ref 7–14)
BASOPHILS # BLD AUTO: 0.04 K/UL — SIGNIFICANT CHANGE UP (ref 0–0.2)
BASOPHILS NFR BLD AUTO: 0.5 % — SIGNIFICANT CHANGE UP (ref 0–2)
BUN SERPL-MCNC: 13 MG/DL — SIGNIFICANT CHANGE UP (ref 7–23)
CALCIUM SERPL-MCNC: 9 MG/DL — SIGNIFICANT CHANGE UP (ref 8.4–10.5)
CHLORIDE SERPL-SCNC: 101 MMOL/L — SIGNIFICANT CHANGE UP (ref 98–107)
CO2 SERPL-SCNC: 26 MMOL/L — SIGNIFICANT CHANGE UP (ref 22–31)
CREAT SERPL-MCNC: 0.82 MG/DL — SIGNIFICANT CHANGE UP (ref 0.5–1.3)
EGFR: 104 ML/MIN/1.73M2 — SIGNIFICANT CHANGE UP
EOSINOPHIL # BLD AUTO: 0.03 K/UL — SIGNIFICANT CHANGE UP (ref 0–0.5)
EOSINOPHIL NFR BLD AUTO: 0.4 % — SIGNIFICANT CHANGE UP (ref 0–6)
GLUCOSE SERPL-MCNC: 99 MG/DL — SIGNIFICANT CHANGE UP (ref 70–99)
HCT VFR BLD CALC: 33.2 % — LOW (ref 39–50)
HGB BLD-MCNC: 10.6 G/DL — LOW (ref 13–17)
IANC: 5.56 K/UL — SIGNIFICANT CHANGE UP (ref 1.8–7.4)
IMM GRANULOCYTES NFR BLD AUTO: 0.6 % — SIGNIFICANT CHANGE UP (ref 0–1.5)
LYMPHOCYTES # BLD AUTO: 1.37 K/UL — SIGNIFICANT CHANGE UP (ref 1–3.3)
LYMPHOCYTES # BLD AUTO: 17.1 % — SIGNIFICANT CHANGE UP (ref 13–44)
MAGNESIUM SERPL-MCNC: 2 MG/DL — SIGNIFICANT CHANGE UP (ref 1.6–2.6)
MCHC RBC-ENTMCNC: 28 PG — SIGNIFICANT CHANGE UP (ref 27–34)
MCHC RBC-ENTMCNC: 31.9 GM/DL — LOW (ref 32–36)
MCV RBC AUTO: 87.8 FL — SIGNIFICANT CHANGE UP (ref 80–100)
MONOCYTES # BLD AUTO: 0.98 K/UL — HIGH (ref 0–0.9)
MONOCYTES NFR BLD AUTO: 12.2 % — SIGNIFICANT CHANGE UP (ref 2–14)
NEUTROPHILS # BLD AUTO: 5.56 K/UL — SIGNIFICANT CHANGE UP (ref 1.8–7.4)
NEUTROPHILS NFR BLD AUTO: 69.2 % — SIGNIFICANT CHANGE UP (ref 43–77)
NRBC # BLD: 0 /100 WBCS — SIGNIFICANT CHANGE UP
NRBC # FLD: 0 K/UL — SIGNIFICANT CHANGE UP
PHOSPHATE SERPL-MCNC: 3.2 MG/DL — SIGNIFICANT CHANGE UP (ref 2.5–4.5)
PLATELET # BLD AUTO: 389 K/UL — SIGNIFICANT CHANGE UP (ref 150–400)
POTASSIUM SERPL-MCNC: 3.6 MMOL/L — SIGNIFICANT CHANGE UP (ref 3.5–5.3)
POTASSIUM SERPL-SCNC: 3.6 MMOL/L — SIGNIFICANT CHANGE UP (ref 3.5–5.3)
RBC # BLD: 3.78 M/UL — LOW (ref 4.2–5.8)
RBC # FLD: 13.9 % — SIGNIFICANT CHANGE UP (ref 10.3–14.5)
SODIUM SERPL-SCNC: 138 MMOL/L — SIGNIFICANT CHANGE UP (ref 135–145)
WBC # BLD: 8.03 K/UL — SIGNIFICANT CHANGE UP (ref 3.8–10.5)
WBC # FLD AUTO: 8.03 K/UL — SIGNIFICANT CHANGE UP (ref 3.8–10.5)

## 2022-04-02 PROCEDURE — 99239 HOSP IP/OBS DSCHRG MGMT >30: CPT

## 2022-04-02 RX ORDER — ASPIRIN/CALCIUM CARB/MAGNESIUM 324 MG
1 TABLET ORAL
Qty: 0 | Refills: 0 | DISCHARGE

## 2022-04-02 RX ORDER — ALPRAZOLAM 0.25 MG
0 TABLET ORAL
Qty: 0 | Refills: 0 | DISCHARGE

## 2022-04-02 RX ORDER — METOPROLOL SUCCINATE AND HYDROCHLOROTHIAZIDE 100; 12.5 MG/1; MG/1
1 TABLET, FILM COATED ORAL
Qty: 0 | Refills: 0 | DISCHARGE

## 2022-04-02 RX ORDER — LOSARTAN POTASSIUM 100 MG/1
2 TABLET, FILM COATED ORAL
Qty: 0 | Refills: 0 | DISCHARGE

## 2022-04-02 RX ORDER — METOPROLOL TARTRATE 50 MG
1 TABLET ORAL
Qty: 30 | Refills: 0
Start: 2022-04-02 | End: 2022-05-01

## 2022-04-02 RX ORDER — GABAPENTIN 400 MG/1
1 CAPSULE ORAL
Qty: 0 | Refills: 0 | DISCHARGE

## 2022-04-02 RX ORDER — SENNA PLUS 8.6 MG/1
2 TABLET ORAL
Qty: 0 | Refills: 0 | DISCHARGE
Start: 2022-04-02

## 2022-04-02 RX ORDER — LACTULOSE 10 G/15ML
0 SOLUTION ORAL
Qty: 0 | Refills: 0 | DISCHARGE

## 2022-04-02 RX ORDER — MORPHINE SULFATE 50 MG/1
1 CAPSULE, EXTENDED RELEASE ORAL
Qty: 0 | Refills: 0 | DISCHARGE

## 2022-04-02 RX ORDER — ALPRAZOLAM 0.25 MG
1 TABLET ORAL
Refills: 0 | DISCHARGE
Start: 2022-04-02

## 2022-04-02 RX ORDER — METHYLNALTREXONE BROMIDE 12 MG/.6ML
3 INJECTION, SOLUTION SUBCUTANEOUS
Qty: 0 | Refills: 0 | DISCHARGE

## 2022-04-02 RX ORDER — BICTEGRAVIR SODIUM, EMTRICITABINE, AND TENOFOVIR ALAFENAMIDE FUMARATE 30; 120; 15 MG/1; MG/1; MG/1
1 TABLET ORAL
Qty: 0 | Refills: 0 | DISCHARGE

## 2022-04-02 RX ORDER — POLYETHYLENE GLYCOL 3350 17 G/17G
17 POWDER, FOR SOLUTION ORAL
Qty: 0 | Refills: 0 | DISCHARGE
Start: 2022-04-02

## 2022-04-02 RX ORDER — DOCUSATE SODIUM 100 MG
1 CAPSULE ORAL
Qty: 0 | Refills: 0 | DISCHARGE

## 2022-04-02 RX ORDER — BICTEGRAVIR SODIUM, EMTRICITABINE, AND TENOFOVIR ALAFENAMIDE FUMARATE 30; 120; 15 MG/1; MG/1; MG/1
1 TABLET ORAL
Qty: 0 | Refills: 0 | DISCHARGE
Start: 2022-04-02

## 2022-04-02 RX ORDER — ALPRAZOLAM 0.25 MG
1 TABLET ORAL
Qty: 0 | Refills: 0 | DISCHARGE
Start: 2022-04-02

## 2022-04-02 RX ORDER — HYDROMORPHONE HYDROCHLORIDE 2 MG/ML
2 INJECTION INTRAMUSCULAR; INTRAVENOUS; SUBCUTANEOUS
Qty: 0 | Refills: 0 | DISCHARGE

## 2022-04-02 RX ORDER — GABAPENTIN 400 MG/1
2 CAPSULE ORAL
Qty: 0 | Refills: 0 | DISCHARGE

## 2022-04-02 RX ORDER — ACETAMINOPHEN 500 MG
2 TABLET ORAL
Qty: 0 | Refills: 0 | DISCHARGE
Start: 2022-04-02

## 2022-04-02 RX ORDER — GABAPENTIN 400 MG/1
1 CAPSULE ORAL
Qty: 90 | Refills: 0
Start: 2022-04-02 | End: 2022-05-01

## 2022-04-02 RX ADMIN — GABAPENTIN 300 MILLIGRAM(S): 400 CAPSULE ORAL at 08:48

## 2022-04-02 RX ADMIN — MORPHINE SULFATE 90 MILLIGRAM(S): 50 CAPSULE, EXTENDED RELEASE ORAL at 15:33

## 2022-04-02 RX ADMIN — MORPHINE SULFATE 90 MILLIGRAM(S): 50 CAPSULE, EXTENDED RELEASE ORAL at 06:57

## 2022-04-02 RX ADMIN — HYDROMORPHONE HYDROCHLORIDE 8 MILLIGRAM(S): 2 INJECTION INTRAMUSCULAR; INTRAVENOUS; SUBCUTANEOUS at 01:53

## 2022-04-02 RX ADMIN — CHLORHEXIDINE GLUCONATE 1 APPLICATION(S): 213 SOLUTION TOPICAL at 11:49

## 2022-04-02 RX ADMIN — HYDROMORPHONE HYDROCHLORIDE 8 MILLIGRAM(S): 2 INJECTION INTRAMUSCULAR; INTRAVENOUS; SUBCUTANEOUS at 02:08

## 2022-04-02 RX ADMIN — HYDROMORPHONE HYDROCHLORIDE 8 MILLIGRAM(S): 2 INJECTION INTRAMUSCULAR; INTRAVENOUS; SUBCUTANEOUS at 13:04

## 2022-04-02 RX ADMIN — HYDROMORPHONE HYDROCHLORIDE 8 MILLIGRAM(S): 2 INJECTION INTRAMUSCULAR; INTRAVENOUS; SUBCUTANEOUS at 09:48

## 2022-04-02 RX ADMIN — HYDROMORPHONE HYDROCHLORIDE 8 MILLIGRAM(S): 2 INJECTION INTRAMUSCULAR; INTRAVENOUS; SUBCUTANEOUS at 14:04

## 2022-04-02 RX ADMIN — BICTEGRAVIR SODIUM, EMTRICITABINE, AND TENOFOVIR ALAFENAMIDE FUMARATE 1 TABLET(S): 30; 120; 15 TABLET ORAL at 11:48

## 2022-04-02 RX ADMIN — HYDROMORPHONE HYDROCHLORIDE 8 MILLIGRAM(S): 2 INJECTION INTRAMUSCULAR; INTRAVENOUS; SUBCUTANEOUS at 08:48

## 2022-04-02 RX ADMIN — GABAPENTIN 300 MILLIGRAM(S): 400 CAPSULE ORAL at 13:04

## 2022-04-02 RX ADMIN — Medication 50 MILLIGRAM(S): at 05:44

## 2022-04-02 RX ADMIN — POLYETHYLENE GLYCOL 3350 17 GRAM(S): 17 POWDER, FOR SOLUTION ORAL at 05:40

## 2022-04-02 NOTE — PROGRESS NOTE ADULT - PROBLEM SELECTOR PLAN 5
-acute on chronic gait instability over the past 3 days  -neuro exam benign, 5/5 in UE b/l, 5/5 strength in RLE, and 4/5 strength in LLE which he states is his baseline  -usually ambulates without assistance, now is using cane  -more likely 2/2 anal pain radiating down legs, less likely due to other causes such as new mets  - PT rec home PT with RW

## 2022-04-02 NOTE — PROVIDER CONTACT NOTE (OTHER) - NAME OF MD/NP/PA/DO NOTIFIED:
Amando TIPTON
Team A pager
Amando, NP
Amy Diaz ACP
Dr Johnson
MONICA - Le Siddiqui
MONICA - Le Siddiqui
ACP Bernice
Nadiya Wade

## 2022-04-02 NOTE — PROVIDER CONTACT NOTE (OTHER) - BACKGROUND
Pt admitted to unit for Neutropenia.
patient admitted for sepsis
Dx: sepsis  PMH: HIV, HLD, prostate ca
Dx: sepsis  PMH: HIV, HLD, prostate ca
Patient admitted for sepsis  PMH prostate cancer, anal cancer, HTN
Pt had rectal EUA today.
admitted with uncontrollable rectal pain/bleeding
pt has hypertension
Patient admitted for sepsis  PMH of prostate cancer, anal cancer, HTN

## 2022-04-02 NOTE — PROVIDER CONTACT NOTE (OTHER) - SITUATION
blood pressure 141/103
pt previously constipated, now on aggressive bowel regimen complaining of multiple BMs, asking to d/c some medication
patient tachycardic with a HR of 106
RN received phone call from lab regarding a cancelled test Pneumocystis J PCR. Cancel quantity not sufficient. JOSE Queen Made informed.
Pulse 115
/100
HR: 104
HR: 115

## 2022-04-02 NOTE — PROVIDER CONTACT NOTE (OTHER) - ASSESSMENT
Dressing is soiled.
HR: 104 - pt in pain, vital taken prior to administration of Dilaudid
HR: 104 - pt in pain, vital taken prior to administration of Dilaudid
Patient assessed, in no acute distress
pt denies headache, pt is in pain
Patient AxOx4. No complaints of dizziness, or shortness of breath. . /84
Patient assessed, in no acute distress
Pt currently stable awaiting discharge sometime today.
A&Ox4, reports multiple soft BMs, denies diarrhea/watery stool

## 2022-04-02 NOTE — PROGRESS NOTE ADULT - SUBJECTIVE AND OBJECTIVE BOX
Patient is a 56y old  Male who presents with a chief complaint of Rectal pain and bleeding, difficulty ambulating (01 Apr 2022 21:36)      INTERVAL HPI/OVERNIGHT EVENTS:  Seen by me this morning, doing well. Some pain in abdomen but overall under control with current pain regimen. Patient and wife learned how to take care of ostomy (supplies given as well). Eager to going home today.    Review of Systems: 12 point review of systems otherwise negative    MEDICATIONS  (STANDING):  atorvastatin 10 milliGRAM(s) Oral at bedtime  bictegravir 50 mG/emtricitabine 200 mG/tenofovir alafenamide 25 mG (BIKTARVY) 1 Tablet(s) Oral daily  chlorhexidine 2% Cloths 1 Application(s) Topical daily  enoxaparin Injectable 40 milliGRAM(s) SubCutaneous every 24 hours  gabapentin 300 milliGRAM(s) Oral <User Schedule>  metoprolol succinate ER 50 milliGRAM(s) Oral daily  morphine ER Tablet 90 milliGRAM(s) Oral every 8 hours  OLANZapine 10 milliGRAM(s) Oral at bedtime  polyethylene glycol 3350 17 Gram(s) Oral two times a day  senna 2 Tablet(s) Oral at bedtime    MEDICATIONS  (PRN):  acetaminophen     Tablet .. 650 milliGRAM(s) Oral every 6 hours PRN Temp greater or equal to 38C (100.4F), Mild Pain (1 - 3)  ALPRAZolam 1 milliGRAM(s) Oral daily PRN anxiety  HYDROmorphone   Tablet 8 milliGRAM(s) Oral every 4 hours PRN Severe Pain (7 - 10)  HYDROmorphone   Tablet 4 milliGRAM(s) Oral every 4 hours PRN Moderate Pain (4 - 6)  HYDROmorphone  Injectable 4 milliGRAM(s) IV Push every 3 hours PRN severe refractory pain  melatonin 3 milliGRAM(s) Oral at bedtime PRN Insomnia  zolpidem 5 milliGRAM(s) Oral at bedtime PRN Insomnia      Allergies    No Known Allergies    Intolerances          Vital Signs Last 24 Hrs  T(C): 36.7 (02 Apr 2022 10:42), Max: 37.5 (01 Apr 2022 21:38)  T(F): 98.1 (02 Apr 2022 10:42), Max: 99.5 (01 Apr 2022 21:38)  HR: 115 (02 Apr 2022 10:42) (103 - 115)  BP: 130/92 (02 Apr 2022 10:42) (126/94 - 156/100)  BP(mean): --  RR: 17 (02 Apr 2022 10:42) (17 - 18)  SpO2: 98% (02 Apr 2022 10:42) (96% - 98%)  CAPILLARY BLOOD GLUCOSE            Physical Exam:    Daily     Daily   General:  Well appearing, NAD, not cachetic  HEENT:  Nonicteric, PERRLA  CV:  RRR, no murmur, no JVD  Lungs:  CTA B/L, no wheezes, rales, rhonchi  Abdomen:  Soft, non-tender, no distended, positive BS, functioning ostomy  Extremities:  2+ pulses, no c/c, no edema  Skin:  Warm and dry, no rashes  :  No davis  Neuro:  AAOx3, non-focal, CN II-XII grossly intact  No Restraints    LABS:                        10.6   8.03  )-----------( 389      ( 02 Apr 2022 06:29 )             33.2     04-02    138  |  101  |  13  ----------------------------<  99  3.6   |  26  |  0.82    Ca    9.0      02 Apr 2022 06:29  Phos  3.2     04-02  Mg     2.00     04-02              RADIOLOGY & ADDITIONAL TESTS:  Reviewed by me

## 2022-04-02 NOTE — PROGRESS NOTE ADULT - PROBLEM SELECTOR PROBLEM 1
Rectal or anal pain

## 2022-04-02 NOTE — PROGRESS NOTE ADULT - PROBLEM SELECTOR PLAN 12
continue atorvastatin 10    # DVT PPx  LMWH    D/w ACP and patient  Planned for discharge home today with HPT/RW/HC for ostomy care  CORS f/up as outpatient in 2 weeks

## 2022-04-02 NOTE — PROVIDER CONTACT NOTE (OTHER) - REASON
Cancel Test
Pt's dressing is soiled with blood. Pt is wondering how he will have BM
/100
HR: 104
HR: 115
Pulse 115
blood pressure 141/103
patient tachycardic
pt complaining of multiple BMs

## 2022-04-02 NOTE — PROVIDER CONTACT NOTE (OTHER) - RECOMMENDATIONS
Notify Provider
Provider returned page and said it is okay to change dressing -- gauze and paper tape. Pt can have a BM as he usually does.
Notify MD
continue to monitor
continue to monitor
continue to monitor patient's HR and status
relieve pain with pain medication

## 2022-04-02 NOTE — PROGRESS NOTE ADULT - REASON FOR ADMISSION
Rectal pain and bleeding, difficulty ambulating
rectal pain/difficulty ambulating
Rectal pain and bleeding, difficulty ambulating
rectal pain/difficulty ambulating
Rectal pain and bleeding, difficulty ambulating

## 2022-04-02 NOTE — PROGRESS NOTE ADULT - NUTRITIONAL ASSESSMENT
This patient has been assessed with a concern for Malnutrition and has been determined to have a diagnosis/diagnoses of Mild protein-calorie malnutrition.    This patient is being managed with:   Diet NPO after Midnight-     NPO Start Date: 24-Mar-2022   NPO Start Time: 23:59  Entered: Mar 24 2022 12:18PM    Diet Regular-  Entered: Mar 18 2022  2:25AM    
This patient has been assessed with a concern for Malnutrition and has been determined to have a diagnosis/diagnoses of Mild protein-calorie malnutrition.    This patient is being managed with:   Diet Regular-  Entered: Mar 18 2022  2:25AM    
This patient has been assessed with a concern for Malnutrition and has been determined to have a diagnosis/diagnoses of Mild protein-calorie malnutrition.    This patient is being managed with:   Diet Regular-  Entered: Mar 18 2022  2:25AM    
This patient has been assessed with a concern for Malnutrition and has been determined to have a diagnosis/diagnoses of Mild protein-calorie malnutrition.    This patient is being managed with:   Diet Clear Liquid-  Supplement Feeding Modality:  Oral  Ensure Clear Cans or Servings Per Day:  3       Frequency:  Three Times a day  Entered: Mar 30 2022  4:02PM    
This patient has been assessed with a concern for Malnutrition and has been determined to have a diagnosis/diagnoses of Mild protein-calorie malnutrition.    This patient is being managed with:   Diet Clear Liquid-  Supplement Feeding Modality:  Oral  Ensure Clear Cans or Servings Per Day:  3       Frequency:  Three Times a day  Entered: Mar 30 2022  4:02PM    
This patient has been assessed with a concern for Malnutrition and has been determined to have a diagnosis/diagnoses of Mild protein-calorie malnutrition.    This patient is being managed with:   Diet Regular-  Entered: Mar 18 2022  2:25AM    
This patient has been assessed with a concern for Malnutrition and has been determined to have a diagnosis/diagnoses of Mild protein-calorie malnutrition.    This patient is being managed with:   Diet Regular-  Entered: Mar 18 2022  2:25AM    
This patient has been assessed with a concern for Malnutrition and has been determined to have a diagnosis/diagnoses of Mild protein-calorie malnutrition.    This patient is being managed with:   Diet Clear Liquid-  Supplement Feeding Modality:  Oral  Ensure Clear Cans or Servings Per Day:  3       Frequency:  Three Times a day  Entered: Mar 30 2022  4:02PM    
This patient has been assessed with a concern for Malnutrition and has been determined to have a diagnosis/diagnoses of Mild protein-calorie malnutrition.    This patient is being managed with:   Diet NPO after Midnight-     NPO Start Date: 24-Mar-2022   NPO Start Time: 23:59  Entered: Mar 24 2022 12:18PM    Diet Regular-  Entered: Mar 18 2022  2:25AM    
This patient has been assessed with a concern for Malnutrition and has been determined to have a diagnosis/diagnoses of Mild protein-calorie malnutrition.    This patient is being managed with:   Diet NPO after Midnight-     NPO Start Date: 29-Mar-2022   NPO Start Time: 23:59  Entered: Mar 29 2022  7:12PM    Diet Regular-  Supplement Feeding Modality:  Oral  Ensure Max Cans or Servings Per Day:  1       Frequency:  Daily  Entered: Mar 28 2022  6:27PM    
This patient has been assessed with a concern for Malnutrition and has been determined to have a diagnosis/diagnoses of Mild protein-calorie malnutrition.    This patient is being managed with:   Diet Regular-  Entered: Mar 18 2022  2:25AM    
This patient has been assessed with a concern for Malnutrition and has been determined to have a diagnosis/diagnoses of Mild protein-calorie malnutrition.    This patient is being managed with:   Diet Regular-  Fiber/Residue Restricted (LOWFIBER)  Entered: Mar 31 2022  5:52PM    
This patient has been assessed with a concern for Malnutrition and has been determined to have a diagnosis/diagnoses of Mild protein-calorie malnutrition.    This patient is being managed with:   Diet Regular-  Entered: Mar 18 2022  2:25AM    
This patient has been assessed with a concern for Malnutrition and has been determined to have a diagnosis/diagnoses of Mild protein-calorie malnutrition.    This patient is being managed with:   Diet NPO after Midnight-     NPO Start Date: 24-Mar-2022   NPO Start Time: 23:59  Entered: Mar 24 2022 12:18PM    Diet Regular-  Entered: Mar 18 2022  2:25AM    
This patient has been assessed with a concern for Malnutrition and has been determined to have a diagnosis/diagnoses of Mild protein-calorie malnutrition.    This patient is being managed with:   Diet Regular-  Entered: Mar 18 2022  2:25AM    
This patient has been assessed with a concern for Malnutrition and has been determined to have a diagnosis/diagnoses of Mild protein-calorie malnutrition.    This patient is being managed with:   Diet Regular-  Entered: Mar 18 2022  2:25AM    
This patient has been assessed with a concern for Malnutrition and has been determined to have a diagnosis/diagnoses of Mild protein-calorie malnutrition.    This patient is being managed with:   Diet Regular-  Supplement Feeding Modality:  Oral  Ensure Max Cans or Servings Per Day:  1       Frequency:  Daily  Entered: Mar 28 2022  6:27PM    
This patient has been assessed with a concern for Malnutrition and has been determined to have a diagnosis/diagnoses of Mild protein-calorie malnutrition.    This patient is being managed with:   Diet Regular-  Entered: Mar 18 2022  2:25AM    
This patient has been assessed with a concern for Malnutrition and has been determined to have a diagnosis/diagnoses of Mild protein-calorie malnutrition.    This patient is being managed with:   Diet Regular-  Fiber/Residue Restricted (LOWFIBER)  Entered: Mar 31 2022  5:52PM    
This patient has been assessed with a concern for Malnutrition and has been determined to have a diagnosis/diagnoses of Mild protein-calorie malnutrition.    This patient is being managed with:   Diet Regular-  Fiber/Residue Restricted (LOWFIBER)  Entered: Mar 31 2022  5:52PM    
This patient has been assessed with a concern for Malnutrition and has been determined to have a diagnosis/diagnoses of Mild protein-calorie malnutrition.    This patient is being managed with:   Diet NPO after Midnight-     NPO Start Date: 29-Mar-2022   NPO Start Time: 23:59  Entered: Mar 29 2022  7:12PM    Diet Regular-  Supplement Feeding Modality:  Oral  Ensure Max Cans or Servings Per Day:  1       Frequency:  Daily  Entered: Mar 28 2022  6:27PM    
This patient has been assessed with a concern for Malnutrition and has been determined to have a diagnosis/diagnoses of Mild protein-calorie malnutrition.    This patient is being managed with:   Diet Regular-  Entered: Mar 18 2022  2:25AM    

## 2022-04-02 NOTE — PROGRESS NOTE ADULT - PROBLEM SELECTOR PLAN 4
given HIV and possible fistula was continued on empiric antibiotics, completed 7d course of zosyn on 3/25, Final bcx 3/17 neg  - Had fever on night of 3/25, Bcx ordered, neg thus far   - Pt recently completed 7d course of zosyn on 3/25 but zosyn reordered 3/26 for low grade temp --> appreciate ID - d/c zosyn 3/31

## 2022-04-02 NOTE — PROGRESS NOTE ADULT - PROVIDER SPECIALTY LIST ADULT
Infectious Disease
Surgery
Colorectal Surgery
Infectious Disease
Palliative Care
Palliative Care
Surgery
Colorectal Surgery
Infectious Disease
Infectious Disease
Colorectal Surgery
Colorectal Surgery
Infectious Disease
Infectious Disease
Surgery
Colorectal Surgery
Colorectal Surgery
Hospitalist
Palliative Care
Palliative Care
Hospitalist
Palliative Care
Palliative Care
Hospitalist
Hospitalist
Internal Medicine
Internal Medicine
Hospitalist
Internal Medicine
Hospitalist
Internal Medicine
Hospitalist

## 2022-04-02 NOTE — PROGRESS NOTE ADULT - PROBLEM SELECTOR PLAN 1
acute on chronic rectal pain associated w/ hematochezia and constipation, s/p multiple radiation treatments to the area due to h/o anal cancer and prostate cancer; chronically on MS contin 60mg q8 and Dilaudid PO 8mg q4, which has not been adequately controlling the pain  - MRI perianal abscess- OR 3/25 severe induration seen circumferentially with ulcerated mucosa and recessed cavity on left side inferior to true lumen. No definite abscess seen therefore no drainage or sphincterotomy was performed.   3/30 s/p lap colostomy --> post op management per surgery, advance diet as tolerated  - apprec CORS recs - ok for discharge home with outpatient follow up in 2 weeks  - Palliative consult helping optimize pain management-apprec recs, to be discharged on MS contin and dilaudid

## 2022-04-02 NOTE — PROVIDER CONTACT NOTE (OTHER) - DATE AND TIME:
02-Apr-2022 03:30
02-Apr-2022 09:45
29-Mar-2022 08:55
29-Mar-2022 11:57
24-Mar-2022 13:43
28-Mar-2022 15:29
02-Apr-2022 10:46
19-Mar-2022 10:46

## 2022-04-02 NOTE — PROGRESS NOTE ADULT - PROBLEM SELECTOR PLAN 11
Continue metoprolol XL 50 given tachycardia  Hold HCTZ and losartan (to be held on discharge as well)

## 2022-04-06 ENCOUNTER — OUTPATIENT (OUTPATIENT)
Dept: OUTPATIENT SERVICES | Facility: HOSPITAL | Age: 57
LOS: 1 days | Discharge: HOME | End: 2022-04-06

## 2022-04-06 DIAGNOSIS — K62.9 DISEASE OF ANUS AND RECTUM, UNSPECIFIED: Chronic | ICD-10-CM

## 2022-04-06 DIAGNOSIS — C18.9 MALIGNANT NEOPLASM OF COLON, UNSPECIFIED: ICD-10-CM

## 2022-04-11 ENCOUNTER — APPOINTMENT (OUTPATIENT)
Dept: HEMATOLOGY ONCOLOGY | Facility: CLINIC | Age: 57
End: 2022-04-11
Payer: MEDICAID

## 2022-04-11 PROCEDURE — 99214 OFFICE O/P EST MOD 30 MIN: CPT | Mod: 95

## 2022-04-11 RX ORDER — GABAPENTIN 100 MG/1
100 CAPSULE ORAL
Qty: 60 | Refills: 1 | Status: DISCONTINUED | COMMUNITY
Start: 2022-03-09 | End: 2022-04-11

## 2022-04-11 NOTE — REASON FOR VISIT
[Follow-Up] : a follow-up visit [Home] : at home, [unfilled] , at the time of the visit. [Medical Office: ([facility])___] : at the medical office located in  [Verbal consent obtained from patient] : the patient, [unfilled]

## 2022-04-13 ENCOUNTER — APPOINTMENT (OUTPATIENT)
Dept: SURGERY | Facility: CLINIC | Age: 57
End: 2022-04-13
Payer: MEDICAID

## 2022-04-13 VITALS
HEART RATE: 136 BPM | SYSTOLIC BLOOD PRESSURE: 152 MMHG | TEMPERATURE: 208.04 F | DIASTOLIC BLOOD PRESSURE: 124 MMHG | WEIGHT: 215 LBS | OXYGEN SATURATION: 98 % | HEIGHT: 70 IN | BODY MASS INDEX: 30.78 KG/M2

## 2022-04-13 PROCEDURE — 99024 POSTOP FOLLOW-UP VISIT: CPT

## 2022-04-13 RX ORDER — BLOOD SUGAR DIAGNOSTIC
STRIP MISCELLANEOUS
Qty: 50 | Refills: 0 | Status: ACTIVE | COMMUNITY
Start: 2021-10-25

## 2022-04-13 RX ORDER — DOCUSATE SODIUM 100 MG/1
100 TABLET ORAL
Qty: 90 | Refills: 0 | Status: ACTIVE | COMMUNITY
Start: 2022-02-23

## 2022-04-13 RX ORDER — BICALUTAMIDE 50 MG/1
50 TABLET ORAL
Qty: 30 | Refills: 0 | Status: ACTIVE | COMMUNITY
Start: 2021-10-25

## 2022-04-13 RX ORDER — ISOPROPYL ALCOHOL 70 ML/100ML
70 SWAB TOPICAL
Qty: 100 | Refills: 0 | Status: ACTIVE | COMMUNITY
Start: 2021-10-25

## 2022-04-19 ENCOUNTER — APPOINTMENT (OUTPATIENT)
Dept: RADIATION ONCOLOGY | Facility: CLINIC | Age: 57
End: 2022-04-19
Payer: MEDICAID

## 2022-04-19 VITALS
OXYGEN SATURATION: 98 % | TEMPERATURE: 95.18 F | DIASTOLIC BLOOD PRESSURE: 115 MMHG | WEIGHT: 215 LBS | HEART RATE: 144 BPM | RESPIRATION RATE: 20 BRPM | HEIGHT: 70 IN | SYSTOLIC BLOOD PRESSURE: 165 MMHG | BODY MASS INDEX: 30.78 KG/M2

## 2022-04-19 PROCEDURE — 99211 OFF/OP EST MAY X REQ PHY/QHP: CPT

## 2022-04-19 NOTE — VITALS
[Maximal Pain Intensity: 8/10] : 8/10 [Pain Location: ___] : Pain Location: [unfilled] [Pain Interferes with ADLs] : Pain interferes with activities of daily living. [Opioid] : opioid [70: Cares for self; unalbe to carry on normal activity or do active work.] : 70: Cares for self; unable to carry on normal activity or do active work. [ECOG Performance Status: 1 - Restricted in physically strenuous activity but ambulatory and able to carry out work of a light or sedentary nature] : Performance Status: 1 - Restricted in physically strenuous activity but ambulatory and able to carry out work of a light or sedentary nature, e.g., light house work, office work

## 2022-04-20 ENCOUNTER — FORM ENCOUNTER (OUTPATIENT)
Age: 57
End: 2022-04-20

## 2022-04-21 ENCOUNTER — APPOINTMENT (OUTPATIENT)
Dept: INFECTIOUS DISEASE | Facility: CLINIC | Age: 57
End: 2022-04-21
Payer: MEDICAID

## 2022-04-21 ENCOUNTER — OUTPATIENT (OUTPATIENT)
Dept: OUTPATIENT SERVICES | Facility: HOSPITAL | Age: 57
LOS: 1 days | End: 2022-04-21
Payer: MEDICAID

## 2022-04-21 ENCOUNTER — LABORATORY RESULT (OUTPATIENT)
Age: 57
End: 2022-04-21

## 2022-04-21 ENCOUNTER — APPOINTMENT (OUTPATIENT)
Dept: INFECTIOUS DISEASE | Facility: CLINIC | Age: 57
End: 2022-04-21

## 2022-04-21 VITALS
DIASTOLIC BLOOD PRESSURE: 99 MMHG | HEART RATE: 119 BPM | WEIGHT: 215 LBS | HEIGHT: 70 IN | TEMPERATURE: 99.5 F | BODY MASS INDEX: 30.78 KG/M2 | OXYGEN SATURATION: 97 % | SYSTOLIC BLOOD PRESSURE: 150 MMHG | RESPIRATION RATE: 20 BRPM

## 2022-04-21 DIAGNOSIS — Z87.891 PERSONAL HISTORY OF NICOTINE DEPENDENCE: ICD-10-CM

## 2022-04-21 DIAGNOSIS — B97.89 OTHER VIRAL AGENTS AS THE CAUSE OF DISEASES CLASSIFIED ELSEWHERE: ICD-10-CM

## 2022-04-21 DIAGNOSIS — Z85.46 PERSONAL HISTORY OF MALIGNANT NEOPLASM OF PROSTATE: ICD-10-CM

## 2022-04-21 DIAGNOSIS — K62.5 HEMORRHAGE OF ANUS AND RECTUM: ICD-10-CM

## 2022-04-21 DIAGNOSIS — K62.9 DISEASE OF ANUS AND RECTUM, UNSPECIFIED: Chronic | ICD-10-CM

## 2022-04-21 PROCEDURE — 99215 OFFICE O/P EST HI 40 MIN: CPT

## 2022-04-21 PROCEDURE — 87900 PHENOTYPE INFECT AGENT DRUG: CPT

## 2022-04-21 PROCEDURE — 86481 TB AG RESPONSE T-CELL SUSP: CPT

## 2022-04-21 PROCEDURE — 80053 COMPREHEN METABOLIC PANEL: CPT

## 2022-04-21 PROCEDURE — 86359 T CELLS TOTAL COUNT: CPT

## 2022-04-21 PROCEDURE — 87536 HIV-1 QUANT&REVRSE TRNSCRPJ: CPT

## 2022-04-21 PROCEDURE — 87591 N.GONORRHOEAE DNA AMP PROB: CPT

## 2022-04-21 PROCEDURE — 83036 HEMOGLOBIN GLYCOSYLATED A1C: CPT

## 2022-04-21 PROCEDURE — 85025 COMPLETE CBC W/AUTO DIFF WBC: CPT

## 2022-04-21 PROCEDURE — 87491 CHLMYD TRACH DNA AMP PROBE: CPT

## 2022-04-21 PROCEDURE — 86803 HEPATITIS C AB TEST: CPT

## 2022-04-21 PROCEDURE — 81381 HLA I TYPING 1 ALLELE HR: CPT

## 2022-04-21 PROCEDURE — 90732 PPSV23 VACC 2 YRS+ SUBQ/IM: CPT

## 2022-04-21 PROCEDURE — G0463: CPT | Mod: 25

## 2022-04-21 PROCEDURE — 86709 HEPATITIS A IGM ANTIBODY: CPT

## 2022-04-21 PROCEDURE — 86706 HEP B SURFACE ANTIBODY: CPT

## 2022-04-21 PROCEDURE — 80061 LIPID PANEL: CPT

## 2022-04-21 PROCEDURE — 36415 COLL VENOUS BLD VENIPUNCTURE: CPT

## 2022-04-21 PROCEDURE — 86704 HEP B CORE ANTIBODY TOTAL: CPT

## 2022-04-21 PROCEDURE — 82306 VITAMIN D 25 HYDROXY: CPT

## 2022-04-21 PROCEDURE — 87521 HEPATITIS C PROBE&RVRS TRNSC: CPT

## 2022-04-21 PROCEDURE — 86593 SYPHILIS TEST NON-TREP QUANT: CPT

## 2022-04-21 PROCEDURE — 86708 HEPATITIS A ANTIBODY: CPT

## 2022-04-21 PROCEDURE — G0009: CPT

## 2022-04-21 PROCEDURE — 87340 HEPATITIS B SURFACE AG IA: CPT

## 2022-04-21 PROCEDURE — 86360 T CELL ABSOLUTE COUNT/RATIO: CPT

## 2022-04-21 PROCEDURE — 86592 SYPHILIS TEST NON-TREP QUAL: CPT

## 2022-04-21 PROCEDURE — 86780 TREPONEMA PALLIDUM: CPT

## 2022-04-21 NOTE — PHYSICAL EXAM
[General Appearance - Alert] : alert [General Appearance - In No Acute Distress] : in no acute distress [Sclera] : the sclera and conjunctiva were normal [Outer Ear] : the ears and nose were normal in appearance [Neck Appearance] : the appearance of the neck was normal [] : no respiratory distress [Auscultation Breath Sounds / Voice Sounds] : lungs were clear to auscultation bilaterally [Heart Sounds] : normal S1 and S2 [Edema] : there was no peripheral edema [Bowel Sounds] : normal bowel sounds [Abdomen Soft] : soft [Costovertebral Angle Tenderness] : no CVA tenderness [FreeTextEntry1] : ostomy [No Palpable Adenopathy] : no palpable adenopathy [Musculoskeletal - Swelling] : no joint swelling [No Focal Deficits] : no focal deficits [Affect] : the affect was normal

## 2022-04-21 NOTE — HISTORY OF PRESENT ILLNESS
[FreeTextEntry1] : 56 m with HTN, HIV diagnosed 1992 on Biktarvy, last VL:UD and CD4>900, prostate ca 2016 s/p radiation, anal cancer (dx 4/2021) s/p radiation and chemotherapy (last 8/2021) complicated by an ulcer,  was admitted forrectal bleeding and pain, also inguinal pain\par CT: Limited evaluation for residual sinus tract. A small droplet of gas is noted in the left intersphincteric space with associated edema, similar in appearance to prior CT dated 1/18/2022. There is an appearance concerning for residual tract\par LS spine MRI with disc bulges with L4-L5, mild to mod spinal stenosis\par s/p Exam under anesthesia with rectal biopsy and rigid sigmoidoscopy 3/25, Severe induration seen circumferentially with ulcerated mucosa and recessed cavity on left side inferior to true lumen. No definite abscess seen therefore no drainage or sphincterotomy was performed, but pt received a 2 week course of zosyn anyway\par s/p loop colostomy 3/30/22 now here for f/u still with some rectal bleeding and pain, his HIV doctor retired and he wants to f/u here\par

## 2022-04-21 NOTE — ASSESSMENT
[FreeTextEntry1] : 56 m with HTN, HIV diagnosed 1992 on Biktarvy, last VL:UD and CD4>900, prostate ca 2016 s/p radiation, anal cancer (dx 4/2021) s/p radiation and chemotherapy (last 8/2021) complicated by an ulcer,  was admitted forrectal bleeding and pain, also inguinal pain\par CT: Limited evaluation for residual sinus tract. A small droplet of gas is noted in the left intersphincteric space with associated edema, similar in appearance to prior CT dated 1/18/2022. There is an appearance concerning for residual tract\par LS spine MRI with disc bulges with L4-L5, mild to mod spinal stenosis\par s/p Exam under anesthesia with rectal biopsy and rigid sigmoidoscopy 3/25, Severe induration seen circumferentially with ulcerated mucosa and recessed cavity on left side inferior to true lumen. No definite abscess seen therefore no drainage or sphincterotomy was performed, but pt received a 2 week course of zosyn anyway\par s/p loop colostomy 3/30/22 now here for f/u still with some rectal bleeding and pain, his HIV doctor retired and he wants to f/u here\par \par \par HIV on Biktarvy, VL UD, CD4 now 239 (was >900 before)\par will check annual labs and c/w biktarvy\par \par anal Ca s/p chemo and RT with rectal ulcer, was admitted for rectal pain and bleeding, there was ?abscess so s/p 2 weeks of zosyn in the hospital but exam under anesthesia just showed ulcer and induration no abscess s/o laparoscopic colostomy\par still has some bleeding and pain but needs f/u with surgery there was no abscess on exam in the hospital and received antibiotics, will not give antibiotics now\par \par HTN, will refer to medicine\par \par HCM: new to us\par s/p flu and COVID vaccine\par will give prevnar today\par

## 2022-04-21 NOTE — REVIEW OF SYSTEMS
[Fever] : no fever [Chills] : no chills [Eye Pain] : no eye pain [Red Eyes] : eyes not red [Earache] : no earache [Loss Of Hearing] : no hearing loss [Chest Pain] : no chest pain [Palpitations] : no palpitations [Shortness Of Breath] : no shortness of breath [Wheezing] : no wheezing [Abdominal Pain] : no abdominal pain [Vomiting] : no vomiting [Dysuria] : no dysuria [Joint Swelling] : no joint swelling [Skin Lesions] : no skin lesions [Confused] : no confusion [Convulsions] : no convulsions [Suicidal] : not suicidal [Easy Bleeding] : no tendency for easy bleeding [Easy Bruising] : no tendency for easy bruising [Negative] : Heme/Lymph [FreeTextEntry7] : still with slight rectal bleeding and pain

## 2022-04-24 NOTE — ASSESSMENT
[FreeTextEntry1] : 56yoM with:\par \par 1. Anal Cancer - s/p 5400cGy/ 15 fractions completed on 8/16/21 with Mitomycin Day #1. Xeloda discontinued 2/2 GI issues. Follow up with Med Onc, Rad Onc, wound care. \par \par 2. Rectal pain\par - Increase MS ER to 100mg TID. C/w PRN Hydromorphone 8mg q4h PRN, Gabapentin 300mg TID. Will monitor pain closely and adjust opioids as pain allows\par - Appreciate pain management input re: nerve block. Patient to follow up once HBOT complete. \par - Narcan co-prescribed previously. \par   \par 3. Perineal ulcer/abscess - follow-up with Dr. Avilez. \par \par 4. Anxiety d/o - Follows closely with psychiatry. \par \par 5. Opioid-induced constipation -  C/w Relistor daily.\par \par 6. Encounter for Palliative Care - Emotional support provided.\par \par Follow up in 2 weeks, call sooner with questions or issues.

## 2022-04-24 NOTE — DATA REVIEWED
[FreeTextEntry1] : MR Lumbar Spine 03/21/2022\par \par FINDINGS:\par VERTEBRAL BODIES AND DISCS: Normal in height. Scattered Schmorl's nodes. No abnormal enhancement. Increased T1 signal is seen involving L5 and the visualized sacrum which may be secondary to prior radiation.\par ALIGNMENT: No subluxations.\par L1-L2 LEVEL: Normal.\par L2-L3 LEVEL: Normal.\par L3-L4 LEVEL: Mild disc bulge. Patent neural foramina\par L4-L5 LEVEL: Diffuse disc bulge with bilateral facet arthrosis. Mild foraminal narrowing more prominent on the right. Mild to moderate combined degenerative/developmental spinal stenosis.\par L5-S1 LEVEL: Mild disc bulge more prominent on the left. Facet arthrosis with mild foraminal stenosis.\par SPINAL CANAL: No other intradural or extradural defects are seen. No abnormal enhancement.\par CONUS MEDULLARIS: Normal. Terminates at the L1 level\par MISCELLANEOUS: Bilateral renal cysts\par \par \par IMPRESSION:\par Disc bulges with L4-L5 mild to moderate spinal stenosis.\par \par \par MR PELVIS RECTAL ANAL WAW IC 03/21/2022\par \par \par BOWEL: No evidence of a perianal fistula. No evidence of recurrent tumor in the anal canal. A 2.9 x 2.4 x 2.1 cm collection of fluid and gas in the left intersphincteric space (7: 24, 8:22) consistent with a perirenal abscess.\par \par REPRODUCTIVE ORGANS: Within normal limits.\par BLADDER: Within normal limits.\par LYMPH NODES: No pelvic lymphadenopathy.\par \par VISUALIZED PORTIONS:\par PERITONEUM: No ascites.\par VESSELS: Within normal limits.\par ABDOMINAL WALL: Subcutaneous edema in the musculature of the pelvis, perineum and subcutaneous tissues of the buttocks.\par BONES: Within normal limits.\par \par IMPRESSION:\par A 2.9 cm left perianal abscess. No evidence of associated fistula.

## 2022-04-24 NOTE — HISTORY OF PRESENT ILLNESS
[FreeTextEntry1] : 56yoF with anal cancer presents for follow-up palliative care visit, referred by Oncology.  \par PMH significant for prostate cancer s/p RT in 2016, HIV on Biktarvy.\par \par In 2020 at age of 55 patient had his screening colonoscopy performed by Dr. Lawrence in June 2020 that as per patient was informed to be within normal limits. Subsequently patient had felt a bump in the perirectal area and at the time was told by GI that this could be a hemorrhoid and was treated as such.\par His symptoms did improve and was eventually referred to Dr. Avilez for further evaluation and in March 2021 patient was seen by Dr. Avilez and underwent an exam and biopsy in April that revealed evidence of anal squamous cell carcinoma. Patient was subsequently referred for evaluation and treatment. Patient did not have full excision of the tumor rather a excisional biopsy.\par \par Main reason for which patient is referred is pain which has been an issue for him for the last 3 months. The pain is localized to the anus/rectum. It is severe, not well controlled. Worse with defecation, sitting. At its worst the pain is 10/10. Rates it as 8/10 presently. \par \par He is currently using Oxycodone IR 20mg PRN, takes this every 6 hours. He was previously tried on transdermal fentanyl 25mcg/hr and oxycontin but he states that neither helped his pain. He no longer uses either. \par Has tried using anusol suppository without relief. \par \par Underwent EUA by Dr. Avilez on 11/26/21 for evaluation of persistent kandy-anal pain and a 4 x 4 cm ulcer was found but no fissure or fistula. A biopsy was performed and was negative for dysplasia or malignancy.  \par \par Interval History:\par Patient presents for follow-up, seen via TeleMedicine. He was admitted [facility] (3/17-4/2/2022) for acute on chronic rectal pain associated w/ hematochezia and constipation.  MRI c/f perianal abscess.  He went to the OR on 3/25; severe induration seen circumferentially with ulcerated mucosa and recessed cavity on left side. No definite abscess seen therefore no drainage or sphincterotomy performed. He is s/p diverting colostomy 3/30.  He was discharged home with [facility] in place. \par \par Rectal pain remains high but is improved.  He is now on MS ER 90mg TID with PRN Hydromorphone 8mg every 4 hours.   He also performs Sitz bathes 2-3 times a day which provide relief while in the bath only. \par \par He will be starting PT.\par \par Current pain regimen:\par MS ER 90mg TID\par PRN Hydromorphone 8mg q4h \par Gabapentin 300mg TID\par Sitz baths\par \par ROS:\par + appetite is good\par +constipation - prune juice and relistor daily\par +anxiety d/o - uses alprazolam 1mg PRN, olanzapine 10mg QHS\par +insomnia - is on long-standing ambien 10mg QHS \par Denies nausea/vomiting.\par All other ROS as outlined or noncontributory. \par \par Patient is , lives with his wife. \par He is not presently driving, uses a car service to get to medical appointments. \par Quit smoking tobacco about two years ago. No EtOH. \par Passes time by watching TV. \par \par Psychiatrist: Dr. Eric Yin ([facility])\par Urologist: Dr. Jung \par \par I-Stop Ref#: [id number]

## 2022-04-24 NOTE — END OF VISIT
[Time Spent: ___ minutes] : I have spent [unfilled] minutes of time on the encounter. [FreeTextEntry3] : Agree with NP assessment and plan as outlined above.

## 2022-04-25 ENCOUNTER — APPOINTMENT (OUTPATIENT)
Dept: HEMATOLOGY ONCOLOGY | Facility: CLINIC | Age: 57
End: 2022-04-25
Payer: MEDICAID

## 2022-04-25 ENCOUNTER — APPOINTMENT (OUTPATIENT)
Dept: HEMATOLOGY ONCOLOGY | Facility: CLINIC | Age: 57
End: 2022-04-25

## 2022-04-25 ENCOUNTER — NON-APPOINTMENT (OUTPATIENT)
Age: 57
End: 2022-04-25

## 2022-04-25 PROCEDURE — 99213 OFFICE O/P EST LOW 20 MIN: CPT | Mod: 95

## 2022-04-25 NOTE — REASON FOR VISIT
[Follow-Up] : a follow-up visit [Home] : at home, [unfilled] , at the time of the visit. [Medical Office: ([facility])___] : at the medical office located in  [Verbal consent obtained from patient] : the patient, [unfilled]

## 2022-04-26 DIAGNOSIS — Z85.46 PERSONAL HISTORY OF MALIGNANT NEOPLASM OF PROSTATE: ICD-10-CM

## 2022-04-26 DIAGNOSIS — Z23 ENCOUNTER FOR IMMUNIZATION: ICD-10-CM

## 2022-04-26 DIAGNOSIS — C21.0 MALIGNANT NEOPLASM OF ANUS, UNSPECIFIED: ICD-10-CM

## 2022-04-26 DIAGNOSIS — B20 HUMAN IMMUNODEFICIENCY VIRUS [HIV] DISEASE: ICD-10-CM

## 2022-04-26 DIAGNOSIS — K62.5 HEMORRHAGE OF ANUS AND RECTUM: ICD-10-CM

## 2022-04-26 DIAGNOSIS — Z87.891 PERSONAL HISTORY OF NICOTINE DEPENDENCE: ICD-10-CM

## 2022-04-27 NOTE — PROVIDER CONTACT NOTE (OTHER) - ACTION/TREATMENT ORDERED:
Patient is here in f/u He is here with his wife AN  service was used  He was recently adm to the [facility] hsevere anemia and rectal bleed Flex sig showed rectal varices He underwent embolization of the superior rectal artery He also recived 2 units of blood  He was then admitted for worsening ascites He was found to be pancytopenic Paracentesis was held off  He now presents with worsening edema feet and ascites
team aware, relistor D/C. Pt educated he is able to refuse stool softeners but they are beneficial due to his s/e of his pain medications. Pt will remain on standing bowel regimen and can refuse PRN
No additional orders.
Provider aware, no intervention at this time
ACP notified and states no interventions at this time. Will continue to monitor
ACP notified and states no interventions at this time. Will continue to monitor
MD aware, will continue to monitor BP trend. Recheck BP in 1 hr
continue to monitor patient's HR and status
relieve pain with pain medication
As per PA Bernice will f/u and inform RN.

## 2022-04-28 NOTE — HISTORY OF PRESENT ILLNESS
[FreeTextEntry1] : 56yoF with anal cancer presents for follow-up palliative care visit, referred by Oncology.  \par PMH significant for prostate cancer s/p RT in 2016, HIV on Biktarvy.\par \par In 2020 at age of 55 patient had his screening colonoscopy performed by Dr. Lawrence in June 2020 that as per patient was informed to be within normal limits. Subsequently patient had felt a bump in the perirectal area and at the time was told by GI that this could be a hemorrhoid and was treated as such.\par His symptoms did improve and was eventually referred to Dr. Avilez for further evaluation and in March 2021 patient was seen by Dr. Avilez and underwent an exam and biopsy in April that revealed evidence of anal squamous cell carcinoma. Patient was subsequently referred for evaluation and treatment. Patient did not have full excision of the tumor rather a excisional biopsy.\par \par Main reason for which patient is referred is pain which has been an issue for him for the last 3 months. The pain is localized to the anus/rectum. It is severe, not well controlled. Worse with defecation, sitting. At its worst the pain is 10/10. Rates it as 8/10 presently. \par \par He is currently using Oxycodone IR 20mg PRN, takes this every 6 hours. He was previously tried on transdermal fentanyl 25mcg/hr and oxycontin but he states that neither helped his pain. He no longer uses either. \par Has tried using anusol suppository without relief. \par \shreyas Underwent EUA by Dr. Avilez on 11/26/21 for evaluation of persistent kandy-anal pain and a 4 x 4 cm ulcer was found but no fissure or fistula. A biopsy was performed and was negative for dysplasia or malignancy.  \par \par  He was admitted [facility] (3/17-4/2/2022) for acute on chronic rectal pain associated w/ hematochezia and constipation.  MRI c/f perianal abscess.  He went to the OR on 3/25; severe induration seen circumferentially with ulcerated mucosa and recessed cavity on left side. No definite abscess seen therefore no drainage or sphincterotomy performed. He is s/p diverting colostomy 3/30. \par \par Interval History (4/25/22): \par Patient presents for follow-up, seen via TeleMedicine. Rectal pain remains high but is improved from previous.  He is now on MS ER 100mg TID with PRN Hydromorphone 8mg every 4 hours.   He also performs Sitz bathes 2-3 times a day which provide relief while in the bath only.  Was informed by home care PT services that he does not require their services. \par \par Current pain regimen:\par MS ER 100mg TID\par PRN Hydromorphone 8mg q4h \par Gabapentin 300mg TID\par Sitz baths\par \par ROS:\par + appetite is good\par +constipation - prune juice and miralax, sometimes his stool is hard coming out into the ostomy\par +anxiety d/o - uses alprazolam 1mg PRN, olanzapine 10mg QHS\par +insomnia - is on long-standing ambien 10mg QHS \par Denies nausea/vomiting.\par All other ROS as outlined or noncontributory. \par \par Patient is , lives with his wife. \par He is not presently driving, uses a car service to get to medical appointments. \par Quit smoking tobacco about two years ago. No EtOH. \par Passes time by watching TV. \par \par Psychiatrist: Dr. Eric Yin ([facility])\par Urologist: Dr. Jung \par \par I-Stop Ref#: [id number]

## 2022-04-28 NOTE — ASSESSMENT
[FreeTextEntry1] : 56yoM with:\par \par 1. Anal Cancer - s/p 5400cGy/ 15 fractions completed on 8/16/21 with Mitomycin Day #1. Xeloda discontinued 2/2 GI issues. Follow up with Med Onc, Rad Onc, wound care. \par \par 2. Rectal pain\par - C/w MS ER 100mg TID. C/w PRN Hydromorphone 8mg q4h PRN,Increase gabapentin regimen to 300/300/600.\par - Appreciate pain management input re: nerve block. Discussed this with patient but he wishes to defer any interventional procedure at this time, he is hoping that this will improve with time.\par - Narcan co-prescribed previously. \par   \par 3. Perineal ulcer/abscess - follow-up with Dr. Avilez. \par \par 4. Anxiety d/o - Follows closely with psychiatry. \par \par 5. Opioid-induced constipation -  C/w Relistor daily.\par \par 6. Encounter for Palliative Care - Emotional support provided.\par \par Follow up in 2 weeks, call sooner with questions or issues.

## 2022-05-01 NOTE — PHYSICAL EXAM
[Normal] : well developed, well nourished, in no acute distress [de-identified] : sitting uncomfortably.

## 2022-05-01 NOTE — REVIEW OF SYSTEMS
[Fatigue] : fatigue [Constipation: Grade 1 - Occasional or intermittent symptoms; occasional use of stool softeners, laxatives, dietary modification, or enema] : Constipation: Grade 1 - Occasional or intermittent symptoms; occasional use of stool softeners, laxatives, dietary modification, or enema [Fatigue: Grade 1 - Fatigue relieved by rest] : Fatigue: Grade 1 - Fatigue relieved by rest [Hematuria: Grade 0] : Hematuria: Grade 0 [Urinary Tract Pain: Grade 0] : Urinary Tract Pain: Grade 0 [Negative] : Allergic/Immunologic [Diarrhea: Grade 0] : Diarrhea: Grade 0 [Dyspepsia: Grade 0] : Dyspepsia: Grade 0 [Dysphagia: Grade 0] : Dysphagia: Grade 0 [Esophagitis: Grade 0] : Esophagitis: Grade 0 [Fecal Incontinence: Grade 0] : Fecal Incontinence: Grade 0 [Gastroparesis: Grade 0] : Gastroparesis: Grade 0 [Nausea: Grade 0] : Nausea: Grade 0 [Proctitis: Grade 0] : Proctitis: Grade 0 [Rectal Pain: Grade 3 - Severe pain; limiting self care ADL] : Rectal Pain: Grade 3 - Severe pain; limiting self care ADL [Small Intestinal Obstruction: Grade 0] : Small Intestinal Obstruction: Grade 0 [Vomiting: Grade 0] : Vomiting: Grade 0 [Fever] : no fever [Chills] : no chills [Night Sweats] : no night sweats [Recent Change In Weight] : ~T no recent weight change [FreeTextEntry7] : LLQ ostomy

## 2022-05-01 NOTE — ADDENDUM
[FreeTextEntry1] : I was present with the Resident during the key/critical portions of the visit. I discussed the case with the Resident and agree with the findings and plan as documented in the Resident 's note, unless noted below.\par Unfortunately, the HB treatment did not seem to make much of an improvement.  He will continue to follow-up with palliative care.\par ROGE

## 2022-05-01 NOTE — HISTORY OF PRESENT ILLNESS
[FreeTextEntry1] : This is a 56 year old male with a history of prostate cancer treated with radiation in 2016 at the [facility] 81 gy / 45 Fractions who presented with a bump in the perirectal area in 2020 that was initially treated as a hemorrhoid , but eventually biopsied in 2021 revealing anal carcinoma. He had excisional biopsy 4/6 with doctor Andres Cortez.Pathology revealed invasive basaloid squamous carcinoma, Chloacoogenic carcinoma, 1 centimeter with focal surface erosion. The tumor involved the deep margin. He completed completed treatment on 8/16/21 to 5400cGy/ 15 fractions for anal cancer. Post tx biopsies negative, \par \par He has started Xeloda M-F x 2 weeks 9/20 - 10/1. d/c 2/2 Gi issues. \par \par 11/29/2021 - \par Patient returns for followup.\par \par Interval Scans MRI from 11/11/2021 shows no residual tumor, with focal scarring. No suspicious lymphadenopathy. There was noted concern for left anterior intersphincteric sinus tract. CT Chest interim shows no metastatic disease. \par \par Patient seen by Dr. Sams for palliative care and pain w/ BM. Also seen by Dr. Avilez - Gen Surg and possible plan for fistulectomy. Reports his per-anal skin is sensitive and dry.  \par He has increase pain secondary to biopsy of the rectal ulcer last week.  Pathology shows benign colorectal tissue with ulcer, granulation tissue and fibrosis.  He reports that he still feels rectal pain especially with BM and also rectal pressure. Pain is improved but still requires 10 mg oxycodone q6.\par \par 1/25/22:  Mr Turcios presents today for routine follow up.  He is still experiencing rectal pain for which he is using MS ER, oxycodone and Sitz bathes.  He continues on his bowel regimen and soft BMs.\par \par 4/19/22 - continued 8/10 pain on oxy/ hydromorphone 8mg q4h, gabapentin 300 TID, having HBO. 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 Managing colostomy ok. Endorses malodorous, purulent drainage (wearing Depends pad). Still able to complete ADL.

## 2022-05-04 ENCOUNTER — APPOINTMENT (OUTPATIENT)
Dept: INTERNAL MEDICINE | Facility: CLINIC | Age: 57
End: 2022-05-04
Payer: MEDICAID

## 2022-05-04 VITALS
BODY MASS INDEX: 30.49 KG/M2 | SYSTOLIC BLOOD PRESSURE: 162 MMHG | OXYGEN SATURATION: 97 % | HEART RATE: 106 BPM | WEIGHT: 213 LBS | HEIGHT: 70 IN | DIASTOLIC BLOOD PRESSURE: 102 MMHG | TEMPERATURE: 97.8 F

## 2022-05-04 DIAGNOSIS — M79.2 NEURALGIA AND NEURITIS, UNSPECIFIED: ICD-10-CM

## 2022-05-04 DIAGNOSIS — T66.XXXS RADIATION SICKNESS, UNSPECIFIED, SEQUELA: ICD-10-CM

## 2022-05-04 PROCEDURE — 99205 OFFICE O/P NEW HI 60 MIN: CPT

## 2022-05-05 NOTE — ASSESSMENT
[FreeTextEntry1] : multiple ongoing medical issues as noted\par receives comprehensive follow-up\par BP has been elevated and is high today

## 2022-05-05 NOTE — HISTORY OF PRESENT ILLNESS
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

## 2022-05-05 NOTE — PHYSICAL EXAM
[No Acute Distress] : no acute distress [No JVD] : no jugular venous distention [Clear to Auscultation] : lungs were clear to auscultation bilaterally [Regular Rhythm] : with a regular rhythm [No Edema] : there was no peripheral edema [Soft] : abdomen soft [No CVA Tenderness] : no CVA  tenderness [No Joint Swelling] : no joint swelling [No Rash] : no rash [No Focal Deficits] : no focal deficits [Alert and Oriented x3] : oriented to person, place, and time [de-identified] : colostomy lower abdomen [FreeTextEntry1] : dsg intact

## 2022-05-05 NOTE — HEALTH RISK ASSESSMENT
[Former] : Former [No] : In the past 12 months have you used drugs other than those required for medical reasons? No [No falls in past year] : Patient reported no falls in the past year [0] : 2) Feeling down, depressed, or hopeless: Not at all (0) [YearQuit] : 2019 [Audit-CScore] : 0 [de-identified] : None [de-identified] : Regular [[id number]] : 0

## 2022-05-05 NOTE — REVIEW OF SYSTEMS
[Fever] : no fever [Earache] : no earache [Chest Pain] : no chest pain [Shortness Of Breath] : no shortness of breath [Abdominal Pain] : no abdominal pain [Joint Pain] : no joint pain [Itching] : no itching [Headache] : no headache

## 2022-05-05 NOTE — PLAN
[FreeTextEntry1] : to start norvasc for management of HTN.\par labs done at ID clinic were reviewed\par EKG done previously with sinus rhythm RBBB\par RTC 6 weeks

## 2022-05-09 ENCOUNTER — NON-APPOINTMENT (OUTPATIENT)
Age: 57
End: 2022-05-09

## 2022-05-11 ENCOUNTER — OUTPATIENT (OUTPATIENT)
Dept: OUTPATIENT SERVICES | Facility: HOSPITAL | Age: 57
LOS: 1 days | Discharge: HOME | End: 2022-05-11

## 2022-05-11 DIAGNOSIS — K62.9 DISEASE OF ANUS AND RECTUM, UNSPECIFIED: Chronic | ICD-10-CM

## 2022-05-11 DIAGNOSIS — C18.9 MALIGNANT NEOPLASM OF COLON, UNSPECIFIED: ICD-10-CM

## 2022-05-16 ENCOUNTER — APPOINTMENT (OUTPATIENT)
Dept: HEMATOLOGY ONCOLOGY | Facility: CLINIC | Age: 57
End: 2022-05-16

## 2022-05-16 ENCOUNTER — APPOINTMENT (OUTPATIENT)
Dept: HEMATOLOGY ONCOLOGY | Facility: CLINIC | Age: 57
End: 2022-05-16
Payer: MEDICAID

## 2022-05-16 PROCEDURE — 99214 OFFICE O/P EST MOD 30 MIN: CPT | Mod: 95

## 2022-05-16 RX ORDER — MORPHINE SULFATE 30 MG/1
30 TABLET, FILM COATED, EXTENDED RELEASE ORAL
Qty: 90 | Refills: 0 | Status: DISCONTINUED | COMMUNITY
Start: 2022-01-21 | End: 2022-05-16

## 2022-05-16 RX ORDER — METHYLPREDNISOLONE 4 MG/1
4 TABLET ORAL
Qty: 1 | Refills: 0 | Status: DISCONTINUED | COMMUNITY
Start: 2022-02-11 | End: 2022-05-16

## 2022-05-16 RX ORDER — LACTULOSE 10 G/15ML
10 SOLUTION ORAL
Qty: 1 | Refills: 0 | Status: DISCONTINUED | COMMUNITY
Start: 2022-02-01 | End: 2022-05-16

## 2022-05-16 RX ORDER — HYDROMORPHONE HYDROCHLORIDE 4 MG/1
4 TABLET ORAL
Qty: 42 | Refills: 0 | Status: DISCONTINUED | COMMUNITY
Start: 2022-04-01 | End: 2022-05-16

## 2022-05-16 RX ORDER — OXYCODONE 20 MG/1
20 TABLET ORAL
Qty: 90 | Refills: 0 | Status: DISCONTINUED | COMMUNITY
Start: 2022-01-17 | End: 2022-05-16

## 2022-05-16 RX ORDER — IBUPROFEN 600 MG/1
600 TABLET, FILM COATED ORAL
Qty: 90 | Refills: 0 | Status: DISCONTINUED | COMMUNITY
Start: 2021-12-06 | End: 2022-05-16

## 2022-05-20 NOTE — ASSESSMENT
[FreeTextEntry1] : 56yoM with:\par \par 1. Anal Cancer - s/p 5400cGy/ 15 fractions completed on 8/16/21 with Mitomycin Day #1. Xeloda discontinued 2/2 GI issues. Follow up with Med Onc, Rad Onc, wound care. \par \par 2. Rectal pain\par - C/w MS ER 100mg TID. C/w PRN Hydromorphone 8mg q4h.  Increase gabapentin regimen to 600/300/600mg.  Will monitor pain closely and assess ability to decrease opioids.\par - Appreciate pain management input re: nerve block. Discussed this with patient as a current option but he wishes to defer any interventional procedure at this time, he is hoping that this will improve with time.  \par - Narcan co-prescribed previously. \par   \par 3. Perineal ulcer/abscess - follow-up with Dr. Avilez. \par \par 4. Anxiety d/o - Follows closely with psychiatry. \par \par 5. Opioid-induced constipation -  C/w prune juice and colace as this is keeping the ostomy output soft.\par \par 6. Encounter for Palliative Care - Emotional support provided.\par \par Follow up in 2 weeks, call sooner with questions or issues.

## 2022-05-20 NOTE — HISTORY OF PRESENT ILLNESS
[FreeTextEntry1] : 56yoF with anal cancer presents for follow-up palliative care visit, referred by Oncology.  \par PMH significant for prostate cancer s/p RT in 2016, HIV on Biktarvy.\par \par In 2020 at age of 55 patient had his screening colonoscopy performed by Dr. Lawrence in June 2020 that as per patient was informed to be within normal limits. Subsequently patient had felt a bump in the perirectal area and at the time was told by GI that this could be a hemorrhoid and was treated as such.\par His symptoms did improve and was eventually referred to Dr. Avilez for further evaluation and in March 2021 patient was seen by Dr. Avilez and underwent an exam and biopsy in April that revealed evidence of anal squamous cell carcinoma. Patient was subsequently referred for evaluation and treatment. Patient did not have full excision of the tumor rather a excisional biopsy.\par \par Main reason for which patient is referred is pain which has been an issue for him for the last 3 months. The pain is localized to the anus/rectum. It is severe, not well controlled. Worse with defecation, sitting. At its worst the pain is 10/10. Rates it as 8/10 presently. \par \par He is currently using Oxycodone IR 20mg PRN, takes this every 6 hours. He was previously tried on transdermal fentanyl 25mcg/hr and oxycontin but he states that neither helped his pain. He no longer uses either. \par Has tried using anusol suppository without relief. \par \shreyas Underwent EUA by Dr. Avilez on 11/26/21 for evaluation of persistent kandy-anal pain and a 4 x 4 cm ulcer was found but no fissure or fistula. A biopsy was performed and was negative for dysplasia or malignancy.  \par \par  He was admitted [facility] (3/17-4/2/2022) for acute on chronic rectal pain associated w/ hematochezia and constipation.  MRI c/f perianal abscess.  He went to the OR on 3/25; severe induration seen circumferentially with ulcerated mucosa and recessed cavity on left side. No definite abscess seen therefore no drainage or sphincterotomy performed. He is s/p diverting colostomy 3/30. \par \par Interval History:\par Patient presents for follow-up, seen via TeleMedicine. Rectal and left leg pain remains high but has improved overall since last visit.   He remains on MS ER 100mg TID with PRN Hydromorphone 8mg every 4 hours.  He tolerated dose increase of Gabapentin 300/300/600mg and would like to trial increase of morning dose. He also performs Sitz bathes 2-3 times a day which provide relief while in the bath only.  \par \par Current pain regimen:\par MS ER 100mg TID\par PRN Hydromorphone 8mg q4h \par Gabapentin 300/300/600mg \par Sitz baths\par \par ROS:\par + appetite is good\par + constipation - improved with ostomy- using prune juice and docusate (no longer using Relistor)\par + anxiety d/o - uses alprazolam 1mg PRN, olanzapine 10mg QHS\par + insomnia - is on long-standing ambien 10mg QHS \par Denies nausea/vomiting.\par All other ROS as outlined or noncontributory. \par \par Patient is , lives with his wife. \par He is not presently driving, uses a car service to get to medical appointments. \par Quit smoking tobacco about two years ago. No EtOH. \par Passes time by watching TV. \par \par Psychiatrist: Dr. Eric Yin ([facility])\par Urologist: Dr. Jung \par \par I-Stop Ref#: [id number]

## 2022-05-30 NOTE — DATA REVIEWED
[FreeTextEntry1] : MRI Pelvis (11/2021): \par FINDINGS: No residual anal mass. There is focal T2 hypointense scarring at the region of anal mass, in the left anterior aspect (23:26).\par At the region of previous mass, there is focal thinning of the anal internal sphincter and a tubular fluid signal intensity, extending anteroinferiorly within the intersphincteric space, approximately 1.8 cm in length, without definite extension into perianal skin. Mild surrounding enhancement.\par \par No suspicious enlarged lymph nodes. No focal abnormality in the other visceral organs or osseous structures.\par \par IMPRESSION:\par No residual tumor, with focal scarring. No suspicious lymphadenopathy.\par Findings suspicious for active left anterior intersphincteric sinus tract, without definite skin opening. Clinical correlation is recommended.\par  No

## 2022-05-31 ENCOUNTER — APPOINTMENT (OUTPATIENT)
Dept: HEMATOLOGY ONCOLOGY | Facility: CLINIC | Age: 57
End: 2022-05-31
Payer: MEDICAID

## 2022-05-31 PROCEDURE — 99214 OFFICE O/P EST MOD 30 MIN: CPT | Mod: 95

## 2022-05-31 RX ORDER — HYDROMORPHONE HYDROCHLORIDE 2 MG/1
2 TABLET ORAL
Qty: 30 | Refills: 0 | Status: ACTIVE | COMMUNITY
Start: 2022-05-31 | End: 1900-01-01

## 2022-05-31 RX ORDER — MORPHINE SULFATE 30 MG/1
30 TABLET, FILM COATED, EXTENDED RELEASE ORAL 3 TIMES DAILY
Qty: 42 | Refills: 0 | Status: DISCONTINUED | COMMUNITY
Start: 2021-12-15 | End: 2022-05-31

## 2022-05-31 RX ORDER — HYDROMORPHONE HYDROCHLORIDE 8 MG/1
8 TABLET ORAL
Qty: 84 | Refills: 0 | Status: DISCONTINUED | COMMUNITY
Start: 2022-03-09 | End: 2022-05-31

## 2022-06-01 NOTE — REASON FOR VISIT
[Follow-Up] : a follow-up visit [Home] : at home, [unfilled] , at the time of the visit. [Medical Office: ([facility])___] : at the medical office located in  [Verbal consent obtained from patient] : the patient, [unfilled] [Spouse] : spouse

## 2022-06-02 NOTE — HISTORY OF PRESENT ILLNESS
[FreeTextEntry1] : 56yoF with anal cancer presents for follow-up palliative care visit, referred by Oncology.  \par PMH significant for prostate cancer s/p RT in 2016, HIV on Biktarvy.\par \par In 2020 at age of 55 patient had his screening colonoscopy performed by Dr. Lawrence in June 2020 that as per patient was informed to be within normal limits. Subsequently patient had felt a bump in the perirectal area and at the time was told by GI that this could be a hemorrhoid and was treated as such.\par His symptoms did improve and was eventually referred to Dr. Avilez for further evaluation and in March 2021 patient was seen by Dr. Avilez and underwent an exam and biopsy in April that revealed evidence of anal squamous cell carcinoma. Patient was subsequently referred for evaluation and treatment. Patient did not have full excision of the tumor rather a excisional biopsy.\par \par Main reason for which patient is referred is pain which has been an issue for him for the last 3 months. The pain is localized to the anus/rectum. It is severe, not well controlled. Worse with defecation, sitting. At its worst the pain is 10/10. Rates it as 8/10 presently. \par \par He is currently using Oxycodone IR 20mg PRN, takes this every 6 hours. He was previously tried on transdermal fentanyl 25mcg/hr and oxycontin but he states that neither helped his pain. He no longer uses either. \par Has tried using anusol suppository without relief. \par \par Underwent EUA by Dr. Avilez on 11/26/21 for evaluation of persistent kandy-anal pain and a 4 x 4 cm ulcer was found but no fissure or fistula. A biopsy was performed and was negative for dysplasia or malignancy.  \par \par  He was admitted [facility] (3/17-4/2/2022) for acute on chronic rectal pain associated w/ hematochezia and constipation.  MRI c/f perianal abscess.  He went to the OR on 3/25; severe induration seen circumferentially with ulcerated mucosa and recessed cavity on left side. No definite abscess seen therefore no drainage or sphincterotomy performed. He is s/p diverting colostomy 3/30. \par \par Interval History:\par Patient presents for follow-up, seen via TeleMedicine. \par Pain has improved significantly.  He has initiated opioid weaning on his own accord.  He decreased MS ER 100mg TID to BID.  Spouse reports episode of nausea and diarrhea yesterday.   Has use one dose of PRN Hydromorphone in last few days.  Patient denies rectal pain, endorses "discomfort".  Currently 0/10.\par \par Current pain regimen:\par MS ER 100mg BID\par PRN Hydromorphone 8mg q4h (has not used) \par Gabapentin 300/300/600mg \par Sitz baths\par \par ROS:\par + appetite is good\par + constipation - improved with ostomy- using prune juice and docusate (no longer using Relistor)\par + anxiety d/o - uses alprazolam 1mg PRN, olanzapine 10mg QHS\par + insomnia - is on long-standing ambien 10mg QHS \par Denies nausea/vomiting.\par All other ROS as outlined or noncontributory. \par \par Patient is , lives with his wife. \par He is not presently driving, uses a car service to get to medical appointments. \par Quit smoking tobacco about two years ago. No EtOH. \par Passes time by watching TV. \par \par Psychiatrist: Dr. Eric Yin ([facility])\par Urologist: Dr. Jung \par \par I-Stop Ref#: [id number]

## 2022-06-02 NOTE — ASSESSMENT
[FreeTextEntry1] : 56yoM with:\par \par 1. Anal Cancer - s/p 5400cGy/ 15 fractions completed on 8/16/21 with Mitomycin Day #1. Xeloda discontinued 2/2 GI issues. Follow up with Med Onc, Rad Onc, wound care. \par \par 2. Rectal pain - Significant improvement\par - Patient has initiated opioid taper but appears to have experienced withdrawal symptoms yesterday.   Discussed recommendations for slow taper.  Decrease MS ER to 60mg TID x1 week.  \par - May utilize PRN Hydromorphone 2mg. \par - Discussed S/S of opioid withdrawal. \par - C/w Gabapentin regimen to 600/300/600mg.  \par - Narcan co-prescribed previously. \par   \par 3. Perineal ulcer/abscess - follow-up with Dr. Avilez. \par \par 4. Anxiety d/o - Follows closely with psychiatry. \par \par 5. Opioid-induced constipation -  C/w prune juice and colace as this is keeping the ostomy output soft.\par \par 6. Encounter for Palliative Care - Emotional support provided.\par \par Follow up in 1 week, call sooner with questions or issues.

## 2022-06-07 ENCOUNTER — APPOINTMENT (OUTPATIENT)
Dept: HEMATOLOGY ONCOLOGY | Facility: CLINIC | Age: 57
End: 2022-06-07
Payer: MEDICAID

## 2022-06-07 PROCEDURE — 99213 OFFICE O/P EST LOW 20 MIN: CPT | Mod: 95

## 2022-06-07 NOTE — HISTORY OF PRESENT ILLNESS
[FreeTextEntry1] : 56yoF with anal cancer presents for follow-up palliative care visit, referred by Oncology.  \par PMH significant for prostate cancer s/p RT in 2016, HIV on Biktarvy.\par \par In 2020 at age of 55 patient had his screening colonoscopy performed by Dr. Lawrence in June 2020 that as per patient was informed to be within normal limits. Subsequently patient had felt a bump in the perirectal area and at the time was told by GI that this could be a hemorrhoid and was treated as such.\par His symptoms did improve and was eventually referred to Dr. Avilez for further evaluation and in March 2021 patient was seen by Dr. Avilez and underwent an exam and biopsy in April that revealed evidence of anal squamous cell carcinoma. Patient was subsequently referred for evaluation and treatment. Patient did not have full excision of the tumor rather a excisional biopsy.\par \par Main reason for which patient is referred is pain which has been an issue for him for the last 3 months. The pain is localized to the anus/rectum. It is severe, not well controlled. Worse with defecation, sitting. At its worst the pain is 10/10. Rates it as 8/10 presently. \par \par He is currently using Oxycodone IR 20mg PRN, takes this every 6 hours. He was previously tried on transdermal fentanyl 25mcg/hr and oxycontin but he states that neither helped his pain. He no longer uses either. \par Has tried using anusol suppository without relief. \par \par Underwent EUA by Dr. Avilez on 11/26/21 for evaluation of persistent kandy-anal pain and a 4 x 4 cm ulcer was found but no fissure or fistula. A biopsy was performed and was negative for dysplasia or malignancy.  \par \par  He was admitted [facility] (3/17-4/2/2022) for acute on chronic rectal pain associated w/ hematochezia and constipation.  MRI c/f perianal abscess.  He went to the OR on 3/25; severe induration seen circumferentially with ulcerated mucosa and recessed cavity on left side. No definite abscess seen therefore no drainage or sphincterotomy performed. He is s/p diverting colostomy 3/30. \par \par Interval History:\par Patient presents for follow-up, seen via TeleMedicine. \par Pain has improved significantly and he is eager to wean opioids as tolerated.  MS ER 60mg TID was recommended x1 week. However, he was able to decrease to BID dosing. Denies withdrawal symptoms   Patient denies rectal pain, endorses "discomfort".  Currently 0/10.\par \par Current pain regimen:\par MS ER 60mg BID\par PRN Hydromorphone 2mg q4h (using once or twice a day) \par Gabapentin 600mg BID\par Sitz baths\par \par ROS:\par + appetite is good\par + constipation - improved with ostomy- using prune juice and docusate (no longer using Relistor)\par + anxiety d/o - uses alprazolam 1mg PRN, olanzapine 10mg QHS\par + insomnia - is on long-standing ambien 10mg QHS \par Denies nausea/vomiting, diarrhea.\par All other ROS as outlined or noncontributory. \par \par Patient is , lives with his wife. \par He is not presently driving, uses a car service to get to medical appointments. \par Quit smoking tobacco about two years ago. No EtOH. \par Passes time by watching TV. \par \par Psychiatrist: Dr. Eric Yin ([facility])\par Urologist: Dr. Jung \par \par I-Stop Ref#: [id number]

## 2022-06-07 NOTE — ASSESSMENT
[FreeTextEntry1] : 56yoM with:\par \par 1. Anal Cancer - s/p 5400cGy/ 15 fractions completed on 8/16/21 with Mitomycin Day #1. Xeloda discontinued 2/2 GI issues. Follow up with Med Onc, Rad Onc, wound care. \par \par 2. Rectal pain - Significant improvement\par - Recommend patient decrease MS ER to 30mg TID but he wishes to decrease to MS ER 30mg BID.  Discussed how this may impact pain and to alter to MS ER 30mg TID should pain levels rise.\par - May utilize PRN Hydromorphone 2mg. \par - Discussed S/S of opioid withdrawal. \par - C/w Gabapentin regimen to 600/300/600mg.  \par - Narcan co-prescribed previously. \par \par 3. Physical debility - would benefit from PT.  Patient defers currently but will consider.\par   \par 4. Perineal ulcer/abscess - follow-up with Dr. Avilez. \par \par 5. Anxiety d/o - Follows closely with psychiatry. \par \par 6. Opioid-induced constipation -  C/w prune juice and colace as this is keeping the ostomy output soft.\par \par 7. Encounter for Palliative Care - Emotional support provided.\par \par Follow up in 1-2 weeks, call sooner with questions or issues.

## 2022-06-07 NOTE — REASON FOR VISIT
[Follow-Up] : a follow-up visit [Spouse] : spouse [Home] : at home, [unfilled] , at the time of the visit. [Medical Office: ([facility])___] : at the medical office located in  [Verbal consent obtained from patient] : the patient, [unfilled]

## 2022-06-10 ENCOUNTER — APPOINTMENT (OUTPATIENT)
Dept: INTERNAL MEDICINE | Facility: CLINIC | Age: 57
End: 2022-06-10
Payer: MEDICAID

## 2022-06-10 ENCOUNTER — OUTPATIENT (OUTPATIENT)
Dept: OUTPATIENT SERVICES | Facility: HOSPITAL | Age: 57
LOS: 1 days | Discharge: HOME | End: 2022-06-10

## 2022-06-10 VITALS
HEART RATE: 138 BPM | DIASTOLIC BLOOD PRESSURE: 89 MMHG | SYSTOLIC BLOOD PRESSURE: 128 MMHG | TEMPERATURE: 96.3 F | OXYGEN SATURATION: 97 %

## 2022-06-10 DIAGNOSIS — K62.9 DISEASE OF ANUS AND RECTUM, UNSPECIFIED: Chronic | ICD-10-CM

## 2022-06-10 DIAGNOSIS — M89.8X5 OTHER SPECIFIED DISORDERS OF BONE, THIGH: ICD-10-CM

## 2022-06-10 DIAGNOSIS — C18.9 MALIGNANT NEOPLASM OF COLON, UNSPECIFIED: ICD-10-CM

## 2022-06-10 PROCEDURE — 99214 OFFICE O/P EST MOD 30 MIN: CPT | Mod: 25

## 2022-06-12 NOTE — ASSESSMENT
[FreeTextEntry1] : etiology L femur in not clear\par given hx anal cancer will check x ray L remur\par blood sent for routine labs

## 2022-06-12 NOTE — HISTORY OF PRESENT ILLNESS
[FreeTextEntry1] : c/o several week hx Lfemur pain - hx trauma\par still has intermittent drainage rectai  region and plans on seeing proctologist again

## 2022-06-12 NOTE — REVIEW OF SYSTEMS
[Fever] : no fever [Chest Pain] : no chest pain [Orthopena] : no orthopnea [Shortness Of Breath] : no shortness of breath [Joint Pain] : joint pain

## 2022-06-12 NOTE — HEALTH RISK ASSESSMENT
[Never] : Never [No] : In the past 12 months have you used drugs other than those required for medical reasons? No [No falls in past year] : Patient reported no falls in the past year [0] : 2) Feeling down, depressed, or hopeless: Not at all (0) [Audit-CScore] : 0 [de-identified] : little walking [de-identified] : healty [[id number]] : 0

## 2022-06-13 ENCOUNTER — NON-APPOINTMENT (OUTPATIENT)
Age: 57
End: 2022-06-13

## 2022-06-14 ENCOUNTER — NON-APPOINTMENT (OUTPATIENT)
Age: 57
End: 2022-06-14

## 2022-06-15 ENCOUNTER — APPOINTMENT (OUTPATIENT)
Dept: SURGERY | Facility: CLINIC | Age: 57
End: 2022-06-15

## 2022-06-17 ENCOUNTER — APPOINTMENT (OUTPATIENT)
Dept: HEMATOLOGY ONCOLOGY | Facility: CLINIC | Age: 57
End: 2022-06-17

## 2022-06-22 ENCOUNTER — APPOINTMENT (OUTPATIENT)
Dept: SURGERY | Facility: CLINIC | Age: 57
End: 2022-06-22

## 2022-06-22 VITALS
HEIGHT: 70 IN | SYSTOLIC BLOOD PRESSURE: 130 MMHG | HEART RATE: 130 BPM | OXYGEN SATURATION: 96 % | WEIGHT: 213 LBS | DIASTOLIC BLOOD PRESSURE: 95 MMHG | TEMPERATURE: 97.5 F | BODY MASS INDEX: 30.49 KG/M2

## 2022-06-22 PROCEDURE — 99024 POSTOP FOLLOW-UP VISIT: CPT

## 2022-06-27 RX ORDER — MORPHINE SULFATE 30 MG/1
30 TABLET, FILM COATED, EXTENDED RELEASE ORAL
Qty: 84 | Refills: 0 | Status: DISCONTINUED | COMMUNITY
Start: 2022-05-31 | End: 2022-06-27

## 2022-07-04 LAB
ALBUMIN SERPL ELPH-MCNC: 4 G/DL
ALP BLD-CCNC: 110 U/L
ALT SERPL-CCNC: 10 U/L
ANION GAP SERPL CALC-SCNC: 12 MMOL/L
AST SERPL-CCNC: 12 U/L
BASOPHILS # BLD AUTO: 0.04 K/UL
BASOPHILS NFR BLD AUTO: 0.5 %
BILIRUB SERPL-MCNC: 0.2 MG/DL
BUN SERPL-MCNC: 13 MG/DL
CALCIUM SERPL-MCNC: 9.3 MG/DL
CHLORIDE SERPL-SCNC: 101 MMOL/L
CHOLEST SERPL-MCNC: 119 MG/DL
CO2 SERPL-SCNC: 26 MMOL/L
CREAT SERPL-MCNC: 0.87 MG/DL
EGFR: 102 ML/MIN/1.73M2
EOSINOPHIL # BLD AUTO: 0.03 K/UL
EOSINOPHIL NFR BLD AUTO: 0.3 %
GLUCOSE SERPL-MCNC: 109 MG/DL
HCT VFR BLD CALC: 37.7 %
HDLC SERPL-MCNC: 33 MG/DL
HGB BLD-MCNC: 11.7 G/DL
IMM GRANULOCYTES NFR BLD AUTO: 0.5 %
LDLC SERPL CALC-MCNC: 60 MG/DL
LYMPHOCYTES # BLD AUTO: 1.58 K/UL
LYMPHOCYTES NFR BLD AUTO: 17.9 %
MAN DIFF?: NORMAL
MCHC RBC-ENTMCNC: 27.2 PG
MCHC RBC-ENTMCNC: 31 GM/DL
MCV RBC AUTO: 87.7 FL
MONOCYTES # BLD AUTO: 0.95 K/UL
MONOCYTES NFR BLD AUTO: 10.8 %
NEUTROPHILS # BLD AUTO: 6.18 K/UL
NEUTROPHILS NFR BLD AUTO: 70 %
NONHDLC SERPL-MCNC: 86 MG/DL
PLATELET # BLD AUTO: 423 K/UL
POTASSIUM SERPL-SCNC: 4.6 MMOL/L
PROT SERPL-MCNC: 7.1 G/DL
RBC # BLD: 4.3 M/UL
RBC # FLD: 15.9 %
SODIUM SERPL-SCNC: 139 MMOL/L
T4 FREE SERPL-MCNC: 1.2 NG/DL
TRIGL SERPL-MCNC: 131 MG/DL
TSH SERPL-ACNC: 2.26 UIU/ML
WBC # FLD AUTO: 8.82 K/UL

## 2022-07-05 ENCOUNTER — APPOINTMENT (OUTPATIENT)
Dept: COLORECTAL SURGERY | Facility: CLINIC | Age: 57
End: 2022-07-05

## 2022-07-05 VITALS
RESPIRATION RATE: 16 BRPM | HEART RATE: 102 BPM | WEIGHT: 213 LBS | DIASTOLIC BLOOD PRESSURE: 87 MMHG | HEIGHT: 71 IN | OXYGEN SATURATION: 98 % | SYSTOLIC BLOOD PRESSURE: 116 MMHG | TEMPERATURE: 96.9 F | BODY MASS INDEX: 29.82 KG/M2

## 2022-07-05 PROCEDURE — 99204 OFFICE O/P NEW MOD 45 MIN: CPT

## 2022-07-05 RX ORDER — METRONIDAZOLE 375 MG/1
375 CAPSULE ORAL
Qty: 14 | Refills: 0 | Status: DISCONTINUED | OUTPATIENT
Start: 2022-04-25 | End: 2022-07-05

## 2022-07-05 RX ORDER — METOPROLOL TARTRATE 75 MG/1
TABLET, FILM COATED ORAL
Refills: 0 | Status: DISCONTINUED | COMMUNITY
End: 2022-07-05

## 2022-07-05 RX ORDER — LACTULOSE 10 G/15ML
10 SOLUTION ORAL; RECTAL
Qty: 473 | Refills: 0 | Status: DISCONTINUED | COMMUNITY
Start: 2022-03-09 | End: 2022-07-05

## 2022-07-05 RX ORDER — LOSARTAN POTASSIUM 50 MG/1
50 TABLET, FILM COATED ORAL
Refills: 0 | Status: DISCONTINUED | COMMUNITY
End: 2022-07-05

## 2022-07-05 RX ORDER — PRAMOXINE HYDROCHLORIDE 150 MG/15G
1 AEROSOL, FOAM RECTAL TWICE DAILY
Qty: 1 | Refills: 3 | Status: DISCONTINUED | COMMUNITY
Start: 2022-04-19 | End: 2022-07-05

## 2022-07-05 RX ORDER — METFORMIN HYDROCHLORIDE 500 MG/1
500 TABLET, COATED ORAL
Refills: 0 | Status: DISCONTINUED | COMMUNITY
End: 2022-07-05

## 2022-07-05 NOTE — CONSULT LETTER
[Dear  ___] : Dear  [unfilled], [Consult Letter:] : I had the pleasure of evaluating your patient, [unfilled]. [Please see my note below.] : Please see my note below. [Consult Closing:] : Thank you very much for allowing me to participate in the care of this patient.  If you have any questions, please do not hesitate to contact me. [Sincerely,] : Sincerely, [FreeTextEntry2] : Rick Avilez [FreeTextEntry3] : Angel Betancourt MD FACS\par Chief Colon and Rectal Surgery\par [facility]

## 2022-07-05 NOTE — REVIEW OF SYSTEMS
[Chills] : chills [Feeling Poorly] : feeling poorly [Shortness Of Breath] : shortness of breath [As Noted in HPI] : as noted in HPI [Anxiety] : anxiety [Depression] : depression [Negative] : Heme/Lymph [FreeTextEntry5] : HTN, elevated cholesterol [FreeTextEntry8] : Urinary frequency, Hx prostate cancer [FreeTextEntry9] : Leg pain

## 2022-07-05 NOTE — HISTORY OF PRESENT ILLNESS
[FreeTextEntry1] : Patient is a 57 yo male here per Dr. Avilez for consistent rectal pain.  He states that back in 2020 he developed rectal pain and initially thought it was a hemorrhoid.  After treatments did not work the area was biopsied in 2021 and found to be anal cancer.  He did have the tumor excised followed by chemo and radiation.  He also has a history of prostate cancer back in 2016 for which he had radiation also.  After these treatments he began to develop painful lumps.  They were evaluated by Dr. Avilez but no treatment was done to them.  The area continued to become progressively worse and he eventually had a diverting ostomy done this year in March.  The patient still complains of severe pain, fevers, discharge with foul odor.  The discharge is mixed with blood at times.  He is also currently getting Lupron injections for recurrent prostate cancer.\par \par Colostomy functioning without event. Patient reports intermittent fevers no specific aggravating factors

## 2022-07-05 NOTE — PHYSICAL EXAM
[Normal Breath Sounds] : Normal breath sounds [Normal Heart Sounds] : normal heart sounds [Alert] : alert [Oriented to Person] : oriented to person [Oriented to Place] : oriented to place [Oriented to Time] : oriented to time [Anxious] : anxious [de-identified] : round, +BS, +Ostomy [de-identified] : Normal Male [de-identified] : NC/AT [de-identified] : Ambulates with cane [de-identified] : Intact

## 2022-07-05 NOTE — ASSESSMENT
[FreeTextEntry1] : Severe perianal pain and discomfort after chemoradiation for anal cancer. Patient with history of radiation therapy for prostate cancer\par -No obvious infection causing acute perianal disease.\par -Patient has attempted hyperbaric oxygen therapy with no relief\par -Changes likely secondary to radiation.\par -Will perform examination under anesthesia to determine any internal connection\par -CT scan of abdomen and pelvis for possible underlying disease\par -We discussed the possibility of proctectomy flap closure. We'll discuss further after imaging and examination under anesthesia\par -I discussed the case with Dr. Avilez.  Patient requested examination under anesthesia by me prior to following up with Dr. Avilez So that full recommendations could be

## 2022-07-05 NOTE — REASON FOR VISIT
[Consultation] : a consultation visit [Family Member] : family member [FreeTextEntry1] : Patient intake forms

## 2022-07-10 ENCOUNTER — APPOINTMENT (OUTPATIENT)
Dept: CT IMAGING | Facility: IMAGING CENTER | Age: 57
End: 2022-07-10

## 2022-07-10 ENCOUNTER — RESULT REVIEW (OUTPATIENT)
Age: 57
End: 2022-07-10

## 2022-07-10 ENCOUNTER — OUTPATIENT (OUTPATIENT)
Dept: OUTPATIENT SERVICES | Facility: HOSPITAL | Age: 57
LOS: 1 days | End: 2022-07-10
Payer: MEDICAID

## 2022-07-10 DIAGNOSIS — C21.0 MALIGNANT NEOPLASM OF ANUS, UNSPECIFIED: ICD-10-CM

## 2022-07-10 DIAGNOSIS — K62.9 DISEASE OF ANUS AND RECTUM, UNSPECIFIED: Chronic | ICD-10-CM

## 2022-07-10 PROCEDURE — 74177 CT ABD & PELVIS W/CONTRAST: CPT

## 2022-07-10 PROCEDURE — 74177 CT ABD & PELVIS W/CONTRAST: CPT | Mod: 26

## 2022-07-12 LAB — SARS-COV-2 N GENE NPH QL NAA+PROBE: NOT DETECTED

## 2022-07-14 ENCOUNTER — APPOINTMENT (OUTPATIENT)
Dept: COLORECTAL SURGERY | Facility: CLINIC | Age: 57
End: 2022-07-14

## 2022-07-14 PROCEDURE — 45330 DIAGNOSTIC SIGMOIDOSCOPY: CPT

## 2022-07-14 NOTE — HISTORY OF PRESENT ILLNESS
[FreeTextEntry1] : Patient is a 55 yo male here per Dr. Avilez for consistent rectal pain.  He states that back in 2020 he developed rectal pain and initially thought it was a hemorrhoid.  After treatments did not work the area was biopsied in 2021 and found to be anal cancer.  He did have the tumor excised followed by chemo and radiation.  He also has a history of prostate cancer back in 2016 for which he had radiation also.  After these treatments he began to develop painful lumps.  They were evaluated by Dr. Avilez but no treatment was done to them.  The area continued to become progressively worse and he eventually had a diverting ostomy done this year in March.  The patient still complains of severe pain, fevers, discharge with foul odor.  The discharge is mixed with blood at times.  He is also currently getting Lupron injections for recurrent prostate cancer.\par \par Colostomy functioning without event. Patient reports intermittent fevers no specific aggravating factors\par \par July 14, 2022-patient presents today for examination under anesthesia and sigmoidoscopy to evaluate anal canal. No change since previous visit.

## 2022-07-14 NOTE — ASSESSMENT
[FreeTextEntry1] : Anal cancer with pelvic cavity/abscess\par -Cavity likely secondary to radiation treatment and progression of tumor. This is a severe cavity that likely will not heal. Given patient's severe symptomatology, I believe the only treatment option would be abdominal perineal resection with flap closure\par -We'll discuss further with patient to determine if he wishes to proceed\par -Patient will require plastic surgery evaluation\par -All questions answered\par -Case discussed with Dr. maria

## 2022-07-14 NOTE — PROCEDURE
[FreeTextEntry1] : External anal exam-no mass or lesion\par Digital rectal exam large cavity noted in the left lateral posterior region chronic and indurated\par Flexible sigmoidoscopy-procedure performed in standard fashion under direct vision with an adult colonoscope. Patient tolerated without event there were no complications\par -Sigmoidoscope introduced in the anus and advanced to the sigmoid colon proximal colon appeared viable and healthy\par -Distal colon showed separation of the mucosa with the underlying cavity likely to the sacrum

## 2022-07-25 ENCOUNTER — APPOINTMENT (OUTPATIENT)
Dept: INTERNAL MEDICINE | Facility: CLINIC | Age: 57
End: 2022-07-25

## 2022-07-25 VITALS
WEIGHT: 189 LBS | SYSTOLIC BLOOD PRESSURE: 124 MMHG | OXYGEN SATURATION: 100 % | DIASTOLIC BLOOD PRESSURE: 89 MMHG | HEIGHT: 71 IN | BODY MASS INDEX: 26.46 KG/M2 | HEART RATE: 115 BPM | TEMPERATURE: 97.2 F

## 2022-07-25 DIAGNOSIS — Z01.818 ENCOUNTER FOR OTHER PREPROCEDURAL EXAMINATION: ICD-10-CM

## 2022-07-25 PROCEDURE — 99214 OFFICE O/P EST MOD 30 MIN: CPT

## 2022-07-25 NOTE — ASSESSMENT
[Patient Requires Further Testing] : Patient requires further testing [Cardiology consultation] : Cardiology consultation [FreeTextEntry4] : preop- BP stable\par EKG reviewed - abnormal- will need cardiac clearance\par labs from one month ago normal\par will follow\par

## 2022-07-25 NOTE — HISTORY OF PRESENT ILLNESS
[No Pertinent Pulmonary History] : no history of asthma, COPD, sleep apnea, or smoking [No Adverse Anesthesia Reaction] : no adverse anesthesia reaction in self or family member [Aortic Stenosis] : no aortic stenosis [Atrial Fibrillation] : no atrial fibrillation [Coronary Artery Disease] : no coronary artery disease [Recent Myocardial Infarction] : no recent myocardial infarction [Implantable Device/Pacemaker] : no implantable device/pacemaker [Chronic Anticoagulation] : no chronic anticoagulation [Chronic Kidney Disease] : no chronic kidney disease [Diabetes] : no diabetes [FreeTextEntry1] : Colostomy  [FreeTextEntry2] : 08/15/2022 [FreeTextEntry3] : Dr. Angel Betancourt [FreeTextEntry4] : preop for colostomy to be made permanent + history or rectal cancer\par PMHx HTN, HIV, prostate cancer, rectal cancer\par \par   [FreeTextEntry7] : EKG- abnormal\par will need cariology clearance\par

## 2022-07-25 NOTE — PHYSICAL EXAM
[No Acute Distress] : no acute distress [Well Nourished] : well nourished [Well Developed] : well developed [Well-Appearing] : well-appearing [Normal Sclera/Conjunctiva] : normal sclera/conjunctiva [PERRL] : pupils equal round and reactive to light [EOMI] : extraocular movements intact [Normal Outer Ear/Nose] : the outer ears and nose were normal in appearance [Normal Oropharynx] : the oropharynx was normal [No JVD] : no jugular venous distention [No Lymphadenopathy] : no lymphadenopathy [Supple] : supple [Thyroid Normal, No Nodules] : the thyroid was normal and there were no nodules present [No Respiratory Distress] : no respiratory distress  [No Accessory Muscle Use] : no accessory muscle use [Clear to Auscultation] : lungs were clear to auscultation bilaterally [Normal Rate] : normal rate  [Regular Rhythm] : with a regular rhythm [Normal S1, S2] : normal S1 and S2 [No Murmur] : no murmur heard [No Carotid Bruits] : no carotid bruits [No Abdominal Bruit] : a ~M bruit was not heard ~T in the abdomen [No Varicosities] : no varicosities [Pedal Pulses Present] : the pedal pulses are present [No Edema] : there was no peripheral edema [No Palpable Aorta] : no palpable aorta [No Extremity Clubbing/Cyanosis] : no extremity clubbing/cyanosis [Soft] : abdomen soft [Non Tender] : non-tender [Non-distended] : non-distended [No Masses] : no abdominal mass palpated [No HSM] : no HSM [Normal Bowel Sounds] : normal bowel sounds [Normal Posterior Cervical Nodes] : no posterior cervical lymphadenopathy [Normal Anterior Cervical Nodes] : no anterior cervical lymphadenopathy [No CVA Tenderness] : no CVA  tenderness [No Spinal Tenderness] : no spinal tenderness [No Joint Swelling] : no joint swelling [Grossly Normal Strength/Tone] : grossly normal strength/tone [No Rash] : no rash [Coordination Grossly Intact] : coordination grossly intact [No Focal Deficits] : no focal deficits [Normal Gait] : normal gait [Deep Tendon Reflexes (DTR)] : deep tendon reflexes were 2+ and symmetric [Normal Affect] : the affect was normal [Normal Insight/Judgement] : insight and judgment were intact [de-identified] : rectal ulcers

## 2022-07-26 ENCOUNTER — APPOINTMENT (OUTPATIENT)
Dept: CARDIOLOGY | Facility: CLINIC | Age: 57
End: 2022-07-26

## 2022-07-27 ENCOUNTER — APPOINTMENT (OUTPATIENT)
Dept: CARDIOLOGY | Facility: CLINIC | Age: 57
End: 2022-07-27

## 2022-07-28 ENCOUNTER — NON-APPOINTMENT (OUTPATIENT)
Age: 57
End: 2022-07-28

## 2022-08-02 ENCOUNTER — APPOINTMENT (OUTPATIENT)
Dept: CARDIOLOGY | Facility: CLINIC | Age: 57
End: 2022-08-02

## 2022-08-02 ENCOUNTER — APPOINTMENT (OUTPATIENT)
Dept: COLORECTAL SURGERY | Facility: CLINIC | Age: 57
End: 2022-08-02

## 2022-08-02 VITALS
BODY MASS INDEX: 26.46 KG/M2 | OXYGEN SATURATION: 96 % | HEART RATE: 112 BPM | WEIGHT: 189 LBS | TEMPERATURE: 97.3 F | SYSTOLIC BLOOD PRESSURE: 132 MMHG | DIASTOLIC BLOOD PRESSURE: 84 MMHG | HEIGHT: 71 IN

## 2022-08-02 PROCEDURE — 93000 ELECTROCARDIOGRAM COMPLETE: CPT

## 2022-08-02 PROCEDURE — 99205 OFFICE O/P NEW HI 60 MIN: CPT | Mod: 25

## 2022-08-02 PROCEDURE — 93306 TTE W/DOPPLER COMPLETE: CPT

## 2022-08-02 PROCEDURE — 99442: CPT

## 2022-08-02 NOTE — HISTORY OF PRESENT ILLNESS
[Preoperative Visit] : for a medical evaluation prior to surgery [Scheduled Procedure ___] : a [unfilled] [Date of Surgery ___] : on [unfilled] [FreeTextEntry1] : CARL ROWLEY 57 year M HIV positive, anal ca, prostate ca treated with XRT and chemo though no anthracyclines. No prior cardiac hx. No DM but prior longstanding smoker now abated. Denies exertional chest pain. Mild  dyspnea, (NYHA class 2-3) likely pulmonary origin; no chest pain (CCS class ); No palpitations; No syncope/presyncope; no claudication No peripheral edema. Lengthy discussion regarding pharmacologic stress nuclear testing. Not currently symptomatic. Of greater concern is his wheezing and potentially adverse pulmonary status and outcomes. RCRI score is 1-2 (associated with moderate periop MACE risk) and there are no prohibitive cardiac contraindications to contemplated colostomy revision. Preop pulmonary/anesthesia evaluation may be helpful. Echo shows no SHYANN or RVH despite EKG right axis and possible RVH voltage. LVEF >60% thickened pericardium without effusion possibly residual pricarditis scarring.

## 2022-08-02 NOTE — ASSESSMENT
[FreeTextEntry1] : CARL ROWLEY 57 year M HIV positive, anal ca, prostate ca treated with XRT and chemo though no anthracyclines. No prior cardiac hx. No DM but prior longstanding smoker now abated. Denies exertional chest pain. Mild  dyspnea, (NYHA class 2-3) likely pulmonary origin; no chest pain (CCS class ); No palpitations; No syncope/presyncope; no claudication No peripheral edema. Lengthy discussion regarding pharmacologic stress nuclear testing. Not currently symptomatic. Of greater concern is his wheezing and potentially adverse pulmonary status and outcomes. RCRI score is 1-2 (associated with moderate periop MACE risk) and there are no prohibitive cardiac contraindications to contemplated colostomy revision. Preop pulmonary/anesthesia evaluation may be helpful. Echo shows no SHYANN or RVH despite EKG right axis and possible RVH voltage. LVEF >60% thickened pericardium without effusion possibly residual pricarditis scarring.

## 2022-08-02 NOTE — PHYSICAL EXAM
[General Appearance - Well Developed] : well developed [Normal Appearance] : normal appearance [Well Groomed] : well groomed [General Appearance - Well Nourished] : well nourished [No Deformities] : no deformities [General Appearance - In No Acute Distress] : no acute distress [Normal Conjunctiva] : the conjunctiva exhibited no abnormalities [Eyelids - No Xanthelasma] : the eyelids demonstrated no xanthelasmas [Normal Oral Mucosa] : normal oral mucosa [No Oral Pallor] : no oral pallor [No Oral Cyanosis] : no oral cyanosis [Exaggerated Use Of Accessory Muscles For Inspiration] : no accessory muscle use [Heart Rate And Rhythm] : heart rate and rhythm were normal [Heart Sounds] : normal S1 and S2 [Murmurs] : no murmurs present [Nail Clubbing] : no clubbing of the fingernails [Cyanosis, Localized] : no localized cyanosis [Petechial Hemorrhages (___cm)] : no petechial hemorrhages [] : no ischemic changes [FreeTextEntry1] : no rub, no JVD

## 2022-08-04 ENCOUNTER — APPOINTMENT (OUTPATIENT)
Dept: PLASTIC SURGERY | Facility: CLINIC | Age: 57
End: 2022-08-04

## 2022-08-04 VITALS
BODY MASS INDEX: 27.44 KG/M2 | WEIGHT: 196 LBS | HEIGHT: 71 IN | TEMPERATURE: 98.1 F | HEART RATE: 107 BPM | SYSTOLIC BLOOD PRESSURE: 134 MMHG | DIASTOLIC BLOOD PRESSURE: 83 MMHG | OXYGEN SATURATION: 100 %

## 2022-08-04 PROCEDURE — 99204 OFFICE O/P NEW MOD 45 MIN: CPT

## 2022-08-09 NOTE — CONSULT LETTER
[Dear  ___] : Dear  [unfilled], [Consult Letter:] : I had the pleasure of evaluating your patient, [unfilled]. [Please see my note below.] : Please see my note below. [Consult Closing:] : Thank you very much for allowing me to participate in the care of this patient.  If you have any questions, please do not hesitate to contact me. [Sincerely,] : Sincerely, [FreeTextEntry3] : German Riggs MD, FACS

## 2022-08-15 RX ORDER — NEOMYCIN SULFATE 500 MG/1
500 TABLET ORAL
Qty: 3 | Refills: 0 | Status: ACTIVE | COMMUNITY
Start: 2022-08-15 | End: 1900-01-01

## 2022-08-16 ENCOUNTER — NON-APPOINTMENT (OUTPATIENT)
Age: 57
End: 2022-08-16

## 2022-08-16 ENCOUNTER — INPATIENT (INPATIENT)
Facility: HOSPITAL | Age: 57
LOS: 3 days | Discharge: HOME | End: 2022-08-20
Attending: STUDENT IN AN ORGANIZED HEALTH CARE EDUCATION/TRAINING PROGRAM | Admitting: STUDENT IN AN ORGANIZED HEALTH CARE EDUCATION/TRAINING PROGRAM

## 2022-08-16 VITALS
SYSTOLIC BLOOD PRESSURE: 129 MMHG | DIASTOLIC BLOOD PRESSURE: 78 MMHG | RESPIRATION RATE: 18 BRPM | OXYGEN SATURATION: 99 % | HEART RATE: 140 BPM | TEMPERATURE: 99 F

## 2022-08-16 DIAGNOSIS — K62.9 DISEASE OF ANUS AND RECTUM, UNSPECIFIED: Chronic | ICD-10-CM

## 2022-08-16 DIAGNOSIS — K62.5 HEMORRHAGE OF ANUS AND RECTUM: ICD-10-CM

## 2022-08-16 LAB
ALBUMIN SERPL ELPH-MCNC: 2.6 G/DL — LOW (ref 3.3–5)
ALP SERPL-CCNC: 71 U/L — SIGNIFICANT CHANGE UP (ref 40–120)
ALT FLD-CCNC: 11 U/L — SIGNIFICANT CHANGE UP (ref 4–41)
ANION GAP SERPL CALC-SCNC: 11 MMOL/L — SIGNIFICANT CHANGE UP (ref 7–14)
ANISOCYTOSIS BLD QL: SLIGHT — SIGNIFICANT CHANGE UP
AST SERPL-CCNC: 13 U/L — SIGNIFICANT CHANGE UP (ref 4–40)
BASE EXCESS BLDV CALC-SCNC: -3.5 MMOL/L — LOW (ref -2–3)
BASOPHILS # BLD AUTO: 0 K/UL — SIGNIFICANT CHANGE UP (ref 0–0.2)
BASOPHILS NFR BLD AUTO: 0 % — SIGNIFICANT CHANGE UP (ref 0–2)
BILIRUB SERPL-MCNC: 1.1 MG/DL — SIGNIFICANT CHANGE UP (ref 0.2–1.2)
BLOOD GAS VENOUS COMPREHENSIVE RESULT: SIGNIFICANT CHANGE UP
BUN SERPL-MCNC: 9 MG/DL — SIGNIFICANT CHANGE UP (ref 7–23)
CALCIUM SERPL-MCNC: 7.9 MG/DL — LOW (ref 8.4–10.5)
CHLORIDE BLDV-SCNC: 104 MMOL/L — SIGNIFICANT CHANGE UP (ref 96–108)
CHLORIDE SERPL-SCNC: 101 MMOL/L — SIGNIFICANT CHANGE UP (ref 98–107)
CO2 BLDV-SCNC: 23.4 MMOL/L — SIGNIFICANT CHANGE UP (ref 22–26)
CO2 SERPL-SCNC: 22 MMOL/L — SIGNIFICANT CHANGE UP (ref 22–31)
CREAT SERPL-MCNC: 0.91 MG/DL — SIGNIFICANT CHANGE UP (ref 0.5–1.3)
EGFR: 99 ML/MIN/1.73M2 — SIGNIFICANT CHANGE UP
EOSINOPHIL # BLD AUTO: 0 K/UL — SIGNIFICANT CHANGE UP (ref 0–0.5)
EOSINOPHIL NFR BLD AUTO: 0 % — SIGNIFICANT CHANGE UP (ref 0–6)
FLUAV AG NPH QL: SIGNIFICANT CHANGE UP
FLUBV AG NPH QL: SIGNIFICANT CHANGE UP
GAS PNL BLDV: 133 MMOL/L — LOW (ref 136–145)
GAS PNL BLDV: SIGNIFICANT CHANGE UP
GLUCOSE BLDV-MCNC: 229 MG/DL — HIGH (ref 70–99)
GLUCOSE SERPL-MCNC: 218 MG/DL — HIGH (ref 70–99)
HCO3 BLDV-SCNC: 22 MMOL/L — SIGNIFICANT CHANGE UP (ref 22–29)
HCT VFR BLD CALC: 31.6 % — LOW (ref 39–50)
HCT VFR BLD CALC: 32.5 % — LOW (ref 39–50)
HCT VFR BLDA CALC: 37 % — LOW (ref 39–51)
HGB BLD CALC-MCNC: 12.2 G/DL — LOW (ref 13–17)
HGB BLD-MCNC: 10.6 G/DL — LOW (ref 13–17)
HGB BLD-MCNC: 10.7 G/DL — LOW (ref 13–17)
HYPOCHROMIA BLD QL: SLIGHT — SIGNIFICANT CHANGE UP
IANC: 23.52 K/UL — HIGH (ref 1.8–7.4)
LACTATE BLDV-MCNC: 2.3 MMOL/L — HIGH (ref 0.5–2)
LIDOCAIN IGE QN: 14 U/L — SIGNIFICANT CHANGE UP (ref 7–60)
LYMPHOCYTES # BLD AUTO: 0.66 K/UL — LOW (ref 1–3.3)
LYMPHOCYTES # BLD AUTO: 2.6 % — LOW (ref 13–44)
MACROCYTES BLD QL: SLIGHT — SIGNIFICANT CHANGE UP
MCHC RBC-ENTMCNC: 28 PG — SIGNIFICANT CHANGE UP (ref 27–34)
MCHC RBC-ENTMCNC: 28.6 PG — SIGNIFICANT CHANGE UP (ref 27–34)
MCHC RBC-ENTMCNC: 32.9 GM/DL — SIGNIFICANT CHANGE UP (ref 32–36)
MCHC RBC-ENTMCNC: 33.5 GM/DL — SIGNIFICANT CHANGE UP (ref 32–36)
MCV RBC AUTO: 85.1 FL — SIGNIFICANT CHANGE UP (ref 80–100)
MCV RBC AUTO: 85.2 FL — SIGNIFICANT CHANGE UP (ref 80–100)
MONOCYTES # BLD AUTO: 0.66 K/UL — SIGNIFICANT CHANGE UP (ref 0–0.9)
MONOCYTES NFR BLD AUTO: 2.6 % — SIGNIFICANT CHANGE UP (ref 2–14)
NEUTROPHILS # BLD AUTO: 24.23 K/UL — HIGH (ref 1.8–7.4)
NEUTROPHILS NFR BLD AUTO: 94.8 % — HIGH (ref 43–77)
NRBC # BLD: 0 /100 WBCS — SIGNIFICANT CHANGE UP (ref 0–0)
NRBC # FLD: 0 K/UL — SIGNIFICANT CHANGE UP (ref 0–0)
PCO2 BLDV: 41 MMHG — LOW (ref 42–55)
PH BLDV: 7.34 — SIGNIFICANT CHANGE UP (ref 7.32–7.43)
PLAT MORPH BLD: NORMAL — SIGNIFICANT CHANGE UP
PLATELET # BLD AUTO: 243 K/UL — SIGNIFICANT CHANGE UP (ref 150–400)
PLATELET # BLD AUTO: 309 K/UL — SIGNIFICANT CHANGE UP (ref 150–400)
PLATELET COUNT - ESTIMATE: NORMAL — SIGNIFICANT CHANGE UP
PO2 BLDV: 35 MMHG — SIGNIFICANT CHANGE UP
POIKILOCYTOSIS BLD QL AUTO: SIGNIFICANT CHANGE UP
POLYCHROMASIA BLD QL SMEAR: SLIGHT — SIGNIFICANT CHANGE UP
POTASSIUM BLDV-SCNC: 4.4 MMOL/L — SIGNIFICANT CHANGE UP (ref 3.5–5.1)
POTASSIUM SERPL-MCNC: 4 MMOL/L — SIGNIFICANT CHANGE UP (ref 3.5–5.3)
POTASSIUM SERPL-SCNC: 4 MMOL/L — SIGNIFICANT CHANGE UP (ref 3.5–5.3)
PROT SERPL-MCNC: 5.7 G/DL — LOW (ref 6–8.3)
RBC # BLD: 3.71 M/UL — LOW (ref 4.2–5.8)
RBC # BLD: 3.82 M/UL — LOW (ref 4.2–5.8)
RBC # FLD: 14.6 % — HIGH (ref 10.3–14.5)
RBC # FLD: 15.1 % — HIGH (ref 10.3–14.5)
RBC BLD AUTO: ABNORMAL
RSV RNA NPH QL NAA+NON-PROBE: SIGNIFICANT CHANGE UP
SAO2 % BLDV: 71.1 % — SIGNIFICANT CHANGE UP
SARS-COV-2 RNA SPEC QL NAA+PROBE: SIGNIFICANT CHANGE UP
SODIUM SERPL-SCNC: 134 MMOL/L — LOW (ref 135–145)
TROPONIN T, HIGH SENSITIVITY RESULT: 11 NG/L — SIGNIFICANT CHANGE UP
WBC # BLD: 18.21 K/UL — HIGH (ref 3.8–10.5)
WBC # BLD: 25.56 K/UL — HIGH (ref 3.8–10.5)
WBC # FLD AUTO: 18.21 K/UL — HIGH (ref 3.8–10.5)
WBC # FLD AUTO: 25.56 K/UL — HIGH (ref 3.8–10.5)

## 2022-08-16 PROCEDURE — 71045 X-RAY EXAM CHEST 1 VIEW: CPT | Mod: 26

## 2022-08-16 PROCEDURE — 99222 1ST HOSP IP/OBS MODERATE 55: CPT

## 2022-08-16 PROCEDURE — 99291 CRITICAL CARE FIRST HOUR: CPT

## 2022-08-16 RX ORDER — BICTEGRAVIR SODIUM, EMTRICITABINE, AND TENOFOVIR ALAFENAMIDE FUMARATE 30; 120; 15 MG/1; MG/1; MG/1
1 TABLET ORAL DAILY
Refills: 0 | Status: DISCONTINUED | OUTPATIENT
Start: 2022-08-16 | End: 2022-08-20

## 2022-08-16 RX ORDER — OLANZAPINE 15 MG/1
5 TABLET, FILM COATED ORAL DAILY
Refills: 0 | Status: DISCONTINUED | OUTPATIENT
Start: 2022-08-16 | End: 2022-08-17

## 2022-08-16 RX ORDER — CALCIUM GLUCONATE 100 MG/ML
2 VIAL (ML) INTRAVENOUS ONCE
Refills: 0 | Status: COMPLETED | OUTPATIENT
Start: 2022-08-16 | End: 2022-08-16

## 2022-08-16 RX ORDER — HYDROMORPHONE HYDROCHLORIDE 2 MG/ML
1 INJECTION INTRAMUSCULAR; INTRAVENOUS; SUBCUTANEOUS EVERY 4 HOURS
Refills: 0 | Status: DISCONTINUED | OUTPATIENT
Start: 2022-08-16 | End: 2022-08-19

## 2022-08-16 RX ORDER — ACETAMINOPHEN 500 MG
1000 TABLET ORAL EVERY 6 HOURS
Refills: 0 | Status: COMPLETED | OUTPATIENT
Start: 2022-08-16 | End: 2022-08-17

## 2022-08-16 RX ORDER — HYDROMORPHONE HYDROCHLORIDE 2 MG/ML
0.5 INJECTION INTRAMUSCULAR; INTRAVENOUS; SUBCUTANEOUS EVERY 4 HOURS
Refills: 0 | Status: DISCONTINUED | OUTPATIENT
Start: 2022-08-16 | End: 2022-08-19

## 2022-08-16 RX ORDER — SODIUM CHLORIDE 9 MG/ML
1000 INJECTION, SOLUTION INTRAVENOUS
Refills: 0 | Status: DISCONTINUED | OUTPATIENT
Start: 2022-08-16 | End: 2022-08-18

## 2022-08-16 RX ORDER — CHLORHEXIDINE GLUCONATE 213 G/1000ML
1 SOLUTION TOPICAL DAILY
Refills: 0 | Status: DISCONTINUED | OUTPATIENT
Start: 2022-08-16 | End: 2022-08-17

## 2022-08-16 RX ADMIN — Medication 1000 MILLIGRAM(S): at 23:48

## 2022-08-16 RX ADMIN — HYDROMORPHONE HYDROCHLORIDE 1 MILLIGRAM(S): 2 INJECTION INTRAMUSCULAR; INTRAVENOUS; SUBCUTANEOUS at 16:30

## 2022-08-16 RX ADMIN — HYDROMORPHONE HYDROCHLORIDE 1 MILLIGRAM(S): 2 INJECTION INTRAMUSCULAR; INTRAVENOUS; SUBCUTANEOUS at 21:19

## 2022-08-16 RX ADMIN — Medication 400 MILLIGRAM(S): at 23:34

## 2022-08-16 RX ADMIN — OLANZAPINE 5 MILLIGRAM(S): 15 TABLET, FILM COATED ORAL at 22:03

## 2022-08-16 RX ADMIN — Medication 400 MILLIGRAM(S): at 17:09

## 2022-08-16 RX ADMIN — Medication 200 GRAM(S): at 16:09

## 2022-08-16 RX ADMIN — HYDROMORPHONE HYDROCHLORIDE 1 MILLIGRAM(S): 2 INJECTION INTRAMUSCULAR; INTRAVENOUS; SUBCUTANEOUS at 16:45

## 2022-08-16 RX ADMIN — HYDROMORPHONE HYDROCHLORIDE 1 MILLIGRAM(S): 2 INJECTION INTRAMUSCULAR; INTRAVENOUS; SUBCUTANEOUS at 21:45

## 2022-08-16 RX ADMIN — BICTEGRAVIR SODIUM, EMTRICITABINE, AND TENOFOVIR ALAFENAMIDE FUMARATE 1 TABLET(S): 30; 120; 15 TABLET ORAL at 22:03

## 2022-08-16 NOTE — H&P ADULT - NSHPPHYSICALEXAM_GEN_ALL_CORE
PHYSICAL EXAM:  GENERAL: NAD, well-groomed, well-developed  HEAD:  Atraumatic, Normocephalic  EYES: EOMI, PERRLA, conjunctiva and sclera clear  ENMT: No tonsillar erythema, exudates, or enlargement; Moist mucous membranes  NECK: Supple, No JVD, Normal thyroid  HEART: Regular rate and rhythm; No murmurs, rubs, or gallops  RESPIRATORY: CTA B/L, No W/R/R  ABDOMEN: Soft, Nontender, ostomy with SS bowel sweat  Rectum: Large clot removed from rectum, clot in rectal vault, no arterial spurting noted, packing with surgicel and kerlix at bedside.

## 2022-08-16 NOTE — ED ADULT NURSE NOTE - NSIMPLEMENTINTERV_GEN_ALL_ED
Implemented All Universal Safety Interventions:  [place] to call system. Call bell, personal items and telephone within reach. Instruct patient to call for assistance. Room bathroom lighting operational. Non-slip footwear when patient is off stretcher. Physically safe environment: no spills, clutter or unnecessary equipment. Stretcher in lowest position, wheels locked, appropriate side rails in place.

## 2022-08-16 NOTE — PATIENT PROFILE ADULT - FALL HARM RISK - RISK INTERVENTIONS

## 2022-08-16 NOTE — ED ADULT TRIAGE NOTE - CHIEF COMPLAINT QUOTE
Pt brought in by EMS from [facility] as a transfer for a GI bleed. Pt has a hx rectal ca, pt received 2 units of blood prior to ED arrival. Pt brought directly to room 18

## 2022-08-16 NOTE — ED PROVIDER NOTE - CLINICAL SUMMARY MEDICAL DECISION MAKING FREE TEXT BOX
Pt is a 58 yo m hx of colon ca s/p resection with ostomy , HIV who presents as transfer from [facility] for rectal bleeding. will attempt to control bleeding and consulted IR and surgery. Will get blood transfusion.

## 2022-08-16 NOTE — H&P ADULT - ASSESSMENT
57 year old male with Hx rectal/prostate cancer s/p radiation, hx of sigmoid colostomy in March with Dr. Avilez, now presents with lower GI bleed that began at 9pm yesterday 8/15    - admit to Dr. Betancourt, [facility]  - SICU admission  - hemorrhage watch for serial CBC  - monitor packing in rectum (kerlix and surgicel) and change as necessary  - hold off on repeat CTA for now (OSH shows no blush); however if patient continuing to bleed obtain CTA  - transfuse PRN  - hold DVT ppx  - IR consult for possible embolization  - no acute surgical intervention at this time.     d/w Dr. Betancourt, CRS attending    [facility], [id number]

## 2022-08-16 NOTE — ED PROVIDER NOTE - PROGRESS NOTE DETAILS
Sharath Goldberg, PGY1 Surgery consulted and now at bedside. Rectum was packed and bleeding was controlled. Sharath Goldberg, PGY1 Spoke with IR who was unaware of pt transfer. pt is not currently an IR candidate in setting of nec fac found on outside CT. Sharath Goldberg, PGY1 Massive transfusion protocol called and beign set up

## 2022-08-16 NOTE — PATIENT PROFILE ADULT - NSPROHMSYMPCOND_GEN_A_NUR
[FreeTextEntry1] : Complete 10 days of antibiotic. Provide ibuprofen as needed for pain or fever. If no improvement within 48 hours return for re-evaluation. Follow up in 2-3 wks for tympanometry.\par D/C Omnicef and start Augmentin as Rx none

## 2022-08-16 NOTE — CONSULT NOTE ADULT - CONSULT REQUESTED BY NAME
COMMENTS:  Receiving full enteral fortified feeds and MCT oil. On Vitamin D supplementation (initiated on 9/7). Last alk phos (10/3) increased to 573 and further increase to 723 on 10/17. Normal Ca and phosp    PLANS:  - Continue to maximize nutrition as able  - Continue vitamin D supplementation dose at  600u/day.  - Follow nutritional labs in one week- ordered for 10/24   Dre Serafin

## 2022-08-16 NOTE — ED ADULT NURSE REASSESSMENT NOTE - NS ED NURSE REASSESS COMMENT FT1
Mobile Critical Care RN: patient is 57/M with PMHx of HIV, rectal and prostate ca s/p sigmoid colostomy in March. patient transferred from [facility] for lower GI bleed. patient received 2UPRBC prior to arrival and upon arrival to ER, patient tachycardic to 150s and bleeding from rectum, pale and diaphoretic. mass transfusion protocol begun. patient received additional 2UPRBC, 2 FFP and 1 PLT. patient remained a&o x4 complaining of leg pain. on 3LNC with no apparent respiratory distress. now sinus tach to 120, normotensive 120s/80s. PIV x3. LR at 125 ml/hr. blood bowel movement and bloody output noted from colostomy. voids to urinal. to be transferred to SICU.

## 2022-08-16 NOTE — ED ADULT NURSE NOTE - OBJECTIVE STATEMENT
A04 56 y/o male transferred from [facility] for GI bleed. Patient has been having a hx of rectal bleeding s/p radiation for prostate and rectal CA. Patent have bloody diarrhea. Blood also noted to ostomy bag. Patient complains of abdominal pain. Patient received 2 units prior to arrival to ED. Patient pale and tachycardic. Patient denies shortness of breath, chest pain, dizziness, nausea, vomiting. Labs sent. Transfusion started.

## 2022-08-16 NOTE — CONSULT NOTE ADULT - ASSESSMENT
ASSESSMENT:  57y Male with Hx rectal cancer s/p radiation, now presents with active lower GI bleeding requiring 4pRBC, 1 FFP, 1 plt. SICU consulted for hemodynamic monitoring.     NEUROLOGIC   - Pain control:   -     RESPIRATORY   - Monitor SpO2 goal >92%    CARDIOVASCULAR   - S/p 4pRBC, 1 FFP, 1 plt  - Monitor hemodynamics and transfuse as needed  - MAP <65    GASTROINTESTINAL   - Diet: NPO    /RENAL   - IV fluids: LR @ 125cc/hr  - Strict I/Os  - Monitor BMP qd  - Maintain davis catheter, strict Is/Os  - Monitor electrolytes, replete PRN    HEMATOLOGIC  - Monitor H/H   - Holding DVT prophylaxis 2/2 active bleed    INFECTIOUS DISEASE  - Febrile to 100.8F  - Tylenol ATC    ENDOCRINE  - Monitor gluc    LINES  - IJ CVC (  /  )  - A line (  /  )  - Davis (  /  )  - PIV     DISPO: SICU     ASSESSMENT:  57y Male with Hx rectal cancer s/p radiation, now presents with active lower GI bleeding requiring 4pRBC, 1 FFP, 1 plt. SICU consulted for hemodynamic monitoring.     NEUROLOGIC   - Pain control: Dilaudid PRN    RESPIRATORY   - Monitor SpO2 goal >92%    CARDIOVASCULAR   - S/p 4pRBC, 1 FFP, 1 plt  - Monitor hemodynamics and transfuse as needed  - MAP <65    GASTROINTESTINAL   - Diet: NPO except meds    /RENAL   - IV fluids: LR @ 125cc/hr  - Strict I/Os  - Monitor BMP qd  - Monitor electrolytes, replete PRN    HEMATOLOGIC  - Monitor H/H   - Holding DVT prophylaxis 2/2 active bleed    INFECTIOUS DISEASE  - Febrile to 100.8F  - Tylenol ATC    ENDOCRINE  - Monitor gluc    LINES  - PIV     DISPO: SICU

## 2022-08-16 NOTE — ED PROVIDER NOTE - OBJECTIVE STATEMENT
Pt is a 58 yo m hx of colon ca s/p resection with ostomy , HIV who presents as transfer from [facility] for rectal bleeding. Pt received 2 units of prbc, was normotensive but tachy to 140's. Pt has blood from rectum and ostomy bad, and feels week. Hgb was 8.7 and transferred for IR alaina in [facility] - dr nguyen and ir accepting

## 2022-08-16 NOTE — CONSULT NOTE ADULT - SUBJECTIVE AND OBJECTIVE BOX
SICU Consult Note       	    HPI: 57 year old male with Hx rectal/prostate cancer s/p radiation, now presents with lower GI bleed that began at 9pm yesterday 8/15. Patient was transferred from [facility] s/p 2 unit PRBC. MTP initiated and patient received additional 2 PRBC, 1 FFP, and 1 plt. Patient awaiting abdominal peritoneal resection with Dr. Betancourt on August 24th. SICU consulted for hemodynamic monitoring.    --------------------------------------------------------------------------------------    VITAL SIGNS:  ICU Vital Signs Last 24 Hrs  T(C): 37.2 (16 Aug 2022 13:38), Max: 37.2 (16 Aug 2022 13:38)  T(F): 98.9 (16 Aug 2022 13:38), Max: 98.9 (16 Aug 2022 13:38)  HR: 140 (16 Aug 2022 13:38) (140 - 140)  BP: 129/78 (16 Aug 2022 13:38) (129/78 - 129/78)  BP(mean): --  ABP: --  ABP(mean): --  RR: 18 (16 Aug 2022 13:38) (18 - 18)  SpO2: 99% (16 Aug 2022 13:38) (99% - 99%)    O2 Parameters below as of 16 Aug 2022 13:38  Patient On (Oxygen Delivery Method): nasal cannula    --------------------------------------------------------------------------------------    EXAM    NEURO: A/O x4  HEENT: NC/AT  RESPIRATORY: nonlabored respirations, normal CW expansion  CARDIO: RRR, S1S2  ABDOMEN: soft, appropriately tender, ostomy bag with clotted blood, active bleeding from rectum tamponaded with surgicel and kerflix  EXTREMITIES: normal strength, no deformities

## 2022-08-16 NOTE — H&P ADULT - HISTORY OF PRESENT ILLNESS
57 year old male with Hx rectal/prostate cancer s/p radiation, hx of sigmoid colostomy in March with Dr. Avilez, now presents with lower GI bleed that began at 9pm yesterday 8/15. Patient was transferred from [facility], s/p 2 unit PRBC. MTP initiated and patient received additional 2 PRBC, 1 FFP, and 1 plt. At time of encounter, tachycardic to 130s, SBP 100s. Mentating appropriately, denies dizziness. Large clot from rectum removed, and rectum packed with kerlix wrapped in surgicel. Patient scheduled abdominal peritoneal resection with Dr. Betancourt on August 24th.

## 2022-08-16 NOTE — ED ADULT NURSE NOTE - CHIEF COMPLAINT QUOTE
Pt brought in by EMS from [facility] as a transfer for a GI bleed. Pt has a hx rectal ca, pt received 2 units of blood prior to ED arrival. Pt brought directly to room 18

## 2022-08-16 NOTE — ED PROVIDER NOTE - ATTENDING CONTRIBUTION TO CARE
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

## 2022-08-17 LAB
ANION GAP SERPL CALC-SCNC: 14 MMOL/L — SIGNIFICANT CHANGE UP (ref 7–14)
ANION GAP SERPL CALC-SCNC: 9 MMOL/L — SIGNIFICANT CHANGE UP (ref 7–14)
APTT BLD: 27.6 SEC — SIGNIFICANT CHANGE UP (ref 27–36.3)
BUN SERPL-MCNC: 7 MG/DL — SIGNIFICANT CHANGE UP (ref 7–23)
BUN SERPL-MCNC: 9 MG/DL — SIGNIFICANT CHANGE UP (ref 7–23)
CALCIUM SERPL-MCNC: 8.3 MG/DL — LOW (ref 8.4–10.5)
CALCIUM SERPL-MCNC: 9 MG/DL — SIGNIFICANT CHANGE UP (ref 8.4–10.5)
CHLORIDE SERPL-SCNC: 104 MMOL/L — SIGNIFICANT CHANGE UP (ref 98–107)
CHLORIDE SERPL-SCNC: 99 MMOL/L — SIGNIFICANT CHANGE UP (ref 98–107)
CO2 SERPL-SCNC: 24 MMOL/L — SIGNIFICANT CHANGE UP (ref 22–31)
CO2 SERPL-SCNC: 24 MMOL/L — SIGNIFICANT CHANGE UP (ref 22–31)
CREAT SERPL-MCNC: 0.53 MG/DL — SIGNIFICANT CHANGE UP (ref 0.5–1.3)
CREAT SERPL-MCNC: 0.67 MG/DL — SIGNIFICANT CHANGE UP (ref 0.5–1.3)
EGFR: 110 ML/MIN/1.73M2 — SIGNIFICANT CHANGE UP
EGFR: 118 ML/MIN/1.73M2 — SIGNIFICANT CHANGE UP
GLUCOSE SERPL-MCNC: 133 MG/DL — HIGH (ref 70–99)
GLUCOSE SERPL-MCNC: 142 MG/DL — HIGH (ref 70–99)
HCT VFR BLD CALC: 28.3 % — LOW (ref 39–50)
HCT VFR BLD CALC: 32.1 % — LOW (ref 39–50)
HGB BLD-MCNC: 10.8 G/DL — LOW (ref 13–17)
HGB BLD-MCNC: 9.7 G/DL — LOW (ref 13–17)
INR BLD: 1.82 RATIO — HIGH (ref 0.88–1.16)
MAGNESIUM SERPL-MCNC: 1.7 MG/DL — SIGNIFICANT CHANGE UP (ref 1.6–2.6)
MAGNESIUM SERPL-MCNC: 1.9 MG/DL — SIGNIFICANT CHANGE UP (ref 1.6–2.6)
MCHC RBC-ENTMCNC: 28.2 PG — SIGNIFICANT CHANGE UP (ref 27–34)
MCHC RBC-ENTMCNC: 28.8 PG — SIGNIFICANT CHANGE UP (ref 27–34)
MCHC RBC-ENTMCNC: 33.6 GM/DL — SIGNIFICANT CHANGE UP (ref 32–36)
MCHC RBC-ENTMCNC: 34.3 GM/DL — SIGNIFICANT CHANGE UP (ref 32–36)
MCV RBC AUTO: 83.8 FL — SIGNIFICANT CHANGE UP (ref 80–100)
MCV RBC AUTO: 84 FL — SIGNIFICANT CHANGE UP (ref 80–100)
NRBC # BLD: 0 /100 WBCS — SIGNIFICANT CHANGE UP (ref 0–0)
NRBC # BLD: 0 /100 WBCS — SIGNIFICANT CHANGE UP (ref 0–0)
NRBC # FLD: 0 K/UL — SIGNIFICANT CHANGE UP (ref 0–0)
NRBC # FLD: 0 K/UL — SIGNIFICANT CHANGE UP (ref 0–0)
PHOSPHATE SERPL-MCNC: 3.2 MG/DL — SIGNIFICANT CHANGE UP (ref 2.5–4.5)
PHOSPHATE SERPL-MCNC: 3.7 MG/DL — SIGNIFICANT CHANGE UP (ref 2.5–4.5)
PLATELET # BLD AUTO: 267 K/UL — SIGNIFICANT CHANGE UP (ref 150–400)
PLATELET # BLD AUTO: 282 K/UL — SIGNIFICANT CHANGE UP (ref 150–400)
POTASSIUM SERPL-MCNC: 3.3 MMOL/L — LOW (ref 3.5–5.3)
POTASSIUM SERPL-MCNC: 3.7 MMOL/L — SIGNIFICANT CHANGE UP (ref 3.5–5.3)
POTASSIUM SERPL-SCNC: 3.3 MMOL/L — LOW (ref 3.5–5.3)
POTASSIUM SERPL-SCNC: 3.7 MMOL/L — SIGNIFICANT CHANGE UP (ref 3.5–5.3)
PROTHROM AB SERPL-ACNC: 21.2 SEC — HIGH (ref 10.5–13.4)
RBC # BLD: 3.37 M/UL — LOW (ref 4.2–5.8)
RBC # BLD: 3.83 M/UL — LOW (ref 4.2–5.8)
RBC # FLD: 14.7 % — HIGH (ref 10.3–14.5)
RBC # FLD: 15.1 % — HIGH (ref 10.3–14.5)
SODIUM SERPL-SCNC: 137 MMOL/L — SIGNIFICANT CHANGE UP (ref 135–145)
SODIUM SERPL-SCNC: 137 MMOL/L — SIGNIFICANT CHANGE UP (ref 135–145)
WBC # BLD: 14.65 K/UL — HIGH (ref 3.8–10.5)
WBC # BLD: 15.56 K/UL — HIGH (ref 3.8–10.5)
WBC # FLD AUTO: 14.65 K/UL — HIGH (ref 3.8–10.5)
WBC # FLD AUTO: 15.56 K/UL — HIGH (ref 3.8–10.5)

## 2022-08-17 PROCEDURE — 99232 SBSQ HOSP IP/OBS MODERATE 35: CPT

## 2022-08-17 PROCEDURE — 99232 SBSQ HOSP IP/OBS MODERATE 35: CPT | Mod: GC

## 2022-08-17 RX ORDER — POTASSIUM CHLORIDE 20 MEQ
10 PACKET (EA) ORAL
Refills: 0 | Status: COMPLETED | OUTPATIENT
Start: 2022-08-17 | End: 2022-08-17

## 2022-08-17 RX ORDER — MAGNESIUM SULFATE 500 MG/ML
2 VIAL (ML) INJECTION ONCE
Refills: 0 | Status: COMPLETED | OUTPATIENT
Start: 2022-08-17 | End: 2022-08-17

## 2022-08-17 RX ORDER — ONDANSETRON 8 MG/1
4 TABLET, FILM COATED ORAL EVERY 6 HOURS
Refills: 0 | Status: DISCONTINUED | OUTPATIENT
Start: 2022-08-17 | End: 2022-08-19

## 2022-08-17 RX ORDER — LANOLIN ALCOHOL/MO/W.PET/CERES
3 CREAM (GRAM) TOPICAL AT BEDTIME
Refills: 0 | Status: DISCONTINUED | OUTPATIENT
Start: 2022-08-17 | End: 2022-08-20

## 2022-08-17 RX ORDER — OLANZAPINE 15 MG/1
5 TABLET, FILM COATED ORAL AT BEDTIME
Refills: 0 | Status: DISCONTINUED | OUTPATIENT
Start: 2022-08-17 | End: 2022-08-20

## 2022-08-17 RX ORDER — AMLODIPINE BESYLATE 2.5 MG/1
5 TABLET ORAL DAILY
Refills: 0 | Status: DISCONTINUED | OUTPATIENT
Start: 2022-08-17 | End: 2022-08-20

## 2022-08-17 RX ORDER — METOPROLOL TARTRATE 50 MG
50 TABLET ORAL DAILY
Refills: 0 | Status: DISCONTINUED | OUTPATIENT
Start: 2022-08-17 | End: 2022-08-20

## 2022-08-17 RX ORDER — ATORVASTATIN CALCIUM 80 MG/1
10 TABLET, FILM COATED ORAL AT BEDTIME
Refills: 0 | Status: DISCONTINUED | OUTPATIENT
Start: 2022-08-17 | End: 2022-08-20

## 2022-08-17 RX ORDER — ENOXAPARIN SODIUM 100 MG/ML
40 INJECTION SUBCUTANEOUS EVERY 24 HOURS
Refills: 0 | Status: DISCONTINUED | OUTPATIENT
Start: 2022-08-17 | End: 2022-08-20

## 2022-08-17 RX ADMIN — Medication 25 GRAM(S): at 04:12

## 2022-08-17 RX ADMIN — CHLORHEXIDINE GLUCONATE 1 APPLICATION(S): 213 SOLUTION TOPICAL at 11:17

## 2022-08-17 RX ADMIN — HYDROMORPHONE HYDROCHLORIDE 1 MILLIGRAM(S): 2 INJECTION INTRAMUSCULAR; INTRAVENOUS; SUBCUTANEOUS at 09:52

## 2022-08-17 RX ADMIN — Medication 50 MILLIGRAM(S): at 06:18

## 2022-08-17 RX ADMIN — HYDROMORPHONE HYDROCHLORIDE 0.5 MILLIGRAM(S): 2 INJECTION INTRAMUSCULAR; INTRAVENOUS; SUBCUTANEOUS at 17:03

## 2022-08-17 RX ADMIN — Medication 1000 MILLIGRAM(S): at 06:40

## 2022-08-17 RX ADMIN — Medication 100 MILLIEQUIVALENT(S): at 06:18

## 2022-08-17 RX ADMIN — OLANZAPINE 5 MILLIGRAM(S): 15 TABLET, FILM COATED ORAL at 22:26

## 2022-08-17 RX ADMIN — BICTEGRAVIR SODIUM, EMTRICITABINE, AND TENOFOVIR ALAFENAMIDE FUMARATE 1 TABLET(S): 30; 120; 15 TABLET ORAL at 11:17

## 2022-08-17 RX ADMIN — HYDROMORPHONE HYDROCHLORIDE 0.5 MILLIGRAM(S): 2 INJECTION INTRAMUSCULAR; INTRAVENOUS; SUBCUTANEOUS at 20:55

## 2022-08-17 RX ADMIN — Medication 100 MILLIEQUIVALENT(S): at 05:14

## 2022-08-17 RX ADMIN — ONDANSETRON 4 MILLIGRAM(S): 8 TABLET, FILM COATED ORAL at 16:40

## 2022-08-17 RX ADMIN — Medication 100 MILLIEQUIVALENT(S): at 04:12

## 2022-08-17 RX ADMIN — HYDROMORPHONE HYDROCHLORIDE 1 MILLIGRAM(S): 2 INJECTION INTRAMUSCULAR; INTRAVENOUS; SUBCUTANEOUS at 02:35

## 2022-08-17 RX ADMIN — HYDROMORPHONE HYDROCHLORIDE 0.5 MILLIGRAM(S): 2 INJECTION INTRAMUSCULAR; INTRAVENOUS; SUBCUTANEOUS at 16:33

## 2022-08-17 RX ADMIN — ATORVASTATIN CALCIUM 10 MILLIGRAM(S): 80 TABLET, FILM COATED ORAL at 22:27

## 2022-08-17 RX ADMIN — Medication 3 MILLIGRAM(S): at 23:03

## 2022-08-17 RX ADMIN — Medication 400 MILLIGRAM(S): at 11:16

## 2022-08-17 RX ADMIN — HYDROMORPHONE HYDROCHLORIDE 1 MILLIGRAM(S): 2 INJECTION INTRAMUSCULAR; INTRAVENOUS; SUBCUTANEOUS at 10:15

## 2022-08-17 RX ADMIN — Medication 400 MILLIGRAM(S): at 06:18

## 2022-08-17 RX ADMIN — HYDROMORPHONE HYDROCHLORIDE 1 MILLIGRAM(S): 2 INJECTION INTRAMUSCULAR; INTRAVENOUS; SUBCUTANEOUS at 02:50

## 2022-08-17 RX ADMIN — AMLODIPINE BESYLATE 5 MILLIGRAM(S): 2.5 TABLET ORAL at 14:05

## 2022-08-17 RX ADMIN — HYDROMORPHONE HYDROCHLORIDE 0.5 MILLIGRAM(S): 2 INJECTION INTRAMUSCULAR; INTRAVENOUS; SUBCUTANEOUS at 21:25

## 2022-08-17 NOTE — PROGRESS NOTE ADULT - SUBJECTIVE AND OBJECTIVE BOX
Subjective:  No acute overnight events.  Patient seen and examined at bedside with surgical team during morning rounds.  Pain well controlled. No nausea/vomiting.    Exam:  General: lying in bed in NAD  Skin: No pallor or jaundice noted.  Head: NCAT, no visible lesions   Chest: no visible deformity, nonlabored respirations   Rectum: multiple clots in rectal vault, no arterial spurting noted, packing with surgicel and kerlix at bedside.    T(C): 36.9 (08-17-22 @ 08:00), Max: 39.1 (08-16-22 @ 14:40)  HR: 95 (08-17-22 @ 08:00) (95 - 140)  BP: 125/97 (08-17-22 @ 08:00) (93/66 - 154/97)  RR: 15 (08-17-22 @ 08:00) (14 - 21)  SpO2: 95% (08-17-22 @ 08:00) (93% - 100%)      08-16-22 @ 07:01  -  08-17-22 @ 07:00  --------------------------------------------------------  IN: 2325 mL / OUT: 2750 mL / NET: -425 mL    08-17-22 @ 07:01  -  08-17-22 @ 09:56  --------------------------------------------------------  IN: 125 mL / OUT: 0 mL / NET: 125 mL        LABS:  cret                        9.7    14.65 )-----------( 267      ( 17 Aug 2022 01:00 )             28.3     08-17    137  |  104  |  9   ----------------------------<  142<H>  3.7   |  24  |  0.67    Ca    8.3<L>      17 Aug 2022 01:00  Phos  3.7     08-17  Mg     1.70     08-17    TPro  5.7<L>  /  Alb  2.6<L>  /  TBili  1.1  /  DBili  x   /  AST  13  /  ALT  11  /  AlkPhos  71  08-16    PT/INR - ( 17 Aug 2022 01:00 )   PT: 21.2 sec;   INR: 1.82 ratio         PTT - ( 17 Aug 2022 01:00 )  PTT:27.6 sec      acetaminophen   IVPB .. 1000 milliGRAM(s) IV Intermittent every 6 hours  atorvastatin 10 milliGRAM(s) Oral at bedtime  bictegravir 50 mG/emtricitabine 200 mG/tenofovir alafenamide 25 mG (BIKTARVY) 1 Tablet(s) Oral daily  chlorhexidine 4% Liquid 1 Application(s) Topical daily  HYDROmorphone  Injectable 1 milliGRAM(s) IV Push every 4 hours PRN  HYDROmorphone  Injectable 0.5 milliGRAM(s) IV Push every 4 hours PRN  lactated ringers. 1000 milliLiter(s) IV Continuous <Continuous>  metoprolol succinate ER 50 milliGRAM(s) Oral daily  OLANZapine 5 milliGRAM(s) Oral daily

## 2022-08-17 NOTE — PROGRESS NOTE ADULT - ATTENDING COMMENTS
GI bleed  a.  No further bleeding observed  b.  Restart SQH    Acute posthemorrhage anemia  a.  S/P 4 units pRBC  b.  Hgb 9.8    Malnutrition  a.  NPO  b.  Discuss with primary team re: restarting diet    Hypertension  a.  Restart lopressor, amlodipine

## 2022-08-17 NOTE — CHART NOTE - NSCHARTNOTEFT_GEN_A_CORE
Others' Prescriptions  Patient Name: David TurciosBirth Date: 1965  Address: [address] [place] [id number]: Male  Rx Written	Rx Dispensed	Drug	Quantity	Days Supply	Prescriber Name	Prescriber Olga #	Payment Method	Dispenser  04/05/2022	04/09/2022	hydromorphone 8 mg tablet	50	8	Patricia Colon)	[id number]	Medicaid	[id number]  11/08/2021	11/09/2021	oxycodone hcl 10 mg tablet	30	7	Eh Valencia MD	[id number]	Medicaid	[id number]  10/28/2021	10/28/2021	oxycodone hcl 10 mg tablet	30	7	Estephanie Alvarez I, NP	[id number]	Medicaid	[id number]  08/25/2021	08/25/2021	oxycodone hcl 5 mg tablet	30	5	More Leonard Agpcnp-Bc	[id number]	Medicaid	[id number]  08/19/2021	08/19/2021	oxycodone hcl 5 mg tablet	30	5	Lou Merchant MD	[id number]	Medicaid	[id number]    Patient Name: David TurciosBirth Date: 1965  Address: [address] [place] [id number]: Male  Rx Written	Rx Dispensed	Drug	Quantity	Days Supply	Prescriber Name	Prescriber Olga #	Payment Method	Dispenser  05/31/2022	06/03/2022	morphine sulf er 30 mg tablet	84	14	Neena Borrego	[id number]	Insurance	[facility]  05/31/2022	06/03/2022	hydromorphone 2 mg tablet	30	3	Neena Borrego	[id number]	Insurance	[facility]  05/16/2022	05/18/2022	hydromorphone 8 mg tablet	84	14	Neena Borrego	[id number]	Insurance	[facility]  05/16/2022	05/18/2022	morphine sulf er 100 mg tablet	90	30	PadNeena escalona	[id number]	Insurance	[facility]  04/25/2022	05/03/2022	hydromorphone 8 mg tablet	84	14	Patricia Colon)	[id number]	Insurance	[facility]  04/11/2022	04/18/2022	morphine sulf er 100 mg tablet	90	30	Neena Borrego	[id number]	Insurance	[facility]  04/11/2022	04/18/2022	hydromorphone 8 mg tablet	84	14	Neena Borrego	[id number]	Insurance	[facility]  03/09/2022	03/09/2022	morphine sulf er 60 mg tablet	90	30	PadNeena escalona	[id number]	Insurance	[facility]  03/09/2022	03/09/2022	hydromorphone 4 mg tablet	90	7	Neena Borrego	[id number]	Insurance	[facility]  02/09/2022	02/23/2022	morphine sulf er 30 mg tablet	90	30	Neena Borrego	[id number]	Insurance	[facility]  02/09/2022	02/10/2022	oxycodone hcl (ir) 30 mg tab	90	15	Neena Borrego	[id number]	Insurance	[facility]  01/26/2022	01/31/2022	oxycodone hcl (ir) 30 mg tab	90	15	Neena Borrego	[id number]	Insurance	[facility]  01/26/2022	01/31/2022	morphine sulf er 15 mg tablet	90	30	Neena Borrego	[id number]	Insurance	[facility]  01/12/2022	01/24/2022	oxycodone hcl (ir) 20 mg tab	90	15	Patricia Colon)	[id number]	Insurance	[facility]  01/12/2022	01/24/2022	morphine sulf er 30 mg tablet	90	30	Patricia Colon)	[id number]	Insurance	[facility]  12/27/2021	01/03/2022	oxycodone hcl (ir) 20 mg tab	90	15	Neena Borrego	[id number]	Insurance	[facility]  12/27/2021	01/03/2022	morphine sulf er 30 mg tablet	60	30	PadronNeena zhu	[id number]	Insurance	[facility]  12/15/2021	12/20/2021	oxycodone hcl (ir) 20 mg tab	90	15	ElianNeena zhu	[id number]	Insurance	[facility]  12/15/2021	12/20/2021	morphine sulf er 15 mg tablet	60	30	ElianNeena zhu	[id number]	Insurance	[facility]  12/01/2021	12/03/2021	oxycodone hcl (ir) 20 mg tab	90	15	AlbertoNeena escalona	[id number]	Insurance	[facility]  11/18/2021	11/18/2021	oxycodone hcl (ir) 20 mg tab	90	15	Patricia Colon)	[id number]	[facility]  10/08/2021	10/08/2021	oxycodone hcl 10 mg tablet	84	21	Andres Smith	[id number]	Insurance	[facility]  08/31/2021	09/02/2021	fentanyl 25 mcg/hr patch	5	15	Andres Smith	[id number]	[facility]  08/27/2021	08/31/2021	oxycodone hcl (ir) 10 mg tab	84	21	Andres Smith	[id number]	[facility]  08/27/2021	08/31/2021	oxycontin er 10 mg tablet	30	15	Andres Smith	[id number]	[place]	[facility]    Patient Name: David TurciosBirth Date: 1965  Address: [address] [place] [id number]: Male  Rx Written	Rx Dispensed	Drug	Quantity	Days Supply	Prescriber Name	Prescriber Olga #	Payment Method	Dispenser  04/01/2022	04/02/2022	morphine sulf er 30 mg tablet	90	10	Bernice Longoria)	[id number]	[facility]  04/01/2022	04/02/2022	hydromorphone 4 mg tablet	42	7	Bernice Longoria)	[id number]	Insurance	[facility]    Patient Name: David TurciosBirth Date: 1965  Address: [address] [place] [id number]: Male  Rx Written	Rx Dispensed	Drug	Quantity	Days Supply	Prescriber Name	Prescriber Olga #	Payment Method	Dispenser  06/21/2022	07/29/2022	zolpidem tartrate 10 mg tablet	30	30	Hanh [id number]	[facility]  07/22/2022	07/29/2022	alprazolam 1 mg tablet	120	30	Hanh Yin	[id number]	[facility]  06/21/2022	06/27/2022	zolpidem tartrate 10 mg tablet	30	30	AnnamariaHanh medina	[id number]	[facility]  06/21/2022	06/27/2022	alprazolam 1 mg tablet	90	30	Hanh Yin	[id number]	[facility]  03/15/2022	05/25/2022	zolpidem tartrate 10 mg tablet	30	30	AnnamariaHanh medina	[id number]	[facility]  05/24/2022	05/25/2022	alprazolam 1 mg tablet	90	30	AnnamariaHanh medina	[id number]	[facility]  03/15/2022	05/04/2022	zolpidem tartrate 10 mg tablet	30	30	AnnamariaHanh [id number]	[facility]  04/15/2022	05/04/2022	alprazolam 1 mg tablet	90	30	AnnamariaaHnh medina	[id number]	[facility]  03/15/2022	03/23/2022	alprazolam 1 mg tablet	90	30	AnnamariaHanh medina	[id number]	[facility]  03/15/2022	03/23/2022	zolpidem tartrate 10 mg tablet	30	30	AnnamariaHanh [id number]	[facility]  10/26/2021	02/21/2022	zolpidem tartrate 10 mg tablet	30	30	AnnamariaHanh medina	[id number]	[facility]  02/10/2022	02/21/2022	alprazolam 1 mg tablet	90	30	AnnamariaHanh medina	[id number]	[facility]  10/26/2021	01/21/2022	zolpidem tartrate 10 mg tablet	30	30	Hanh Yin	[id number]	[facility]  01/11/2022	01/21/2022	alprazolam 1 mg tablet	90	30	Hanh Yin	[id number]	[facility]  10/26/2021	12/23/2021	zolpidem tartrate 10 mg tablet	30	30	Hanh Yin	[id number]	[facility]  12/02/2021	12/06/2021	alprazolam 1 mg tablet	90	30	Hanh Yin	[id number]	[facility]  10/26/2021	11/24/2021	zolpidem tartrate 10 mg tablet	30	30	Hanh Yin	[id number]	[facility]  10/26/2021	10/26/2021	zolpidem tartrate 10 mg tablet	30	30	AnnamariaHanh medina MD	[id number]	[facility]  10/26/2021	10/26/2021	alprazolam 1 mg tablet	90	30	AnnamariaHanh medina MD	[id number]	[facility]  08/18/2021	09/27/2021	zolpidem tartrate 10 mg tablet	30	30	AnnamariaHanh medina MD	[id number]	[facility]  09/23/2021	09/27/2021	alprazolam 1 mg tablet	90	30	Hanh Yin MD	[id number]	[facility]  08/18/2021	08/25/2021	alprazolam 1 mg tablet	90	30	Hanh Yin MD	[id number]	[facility]  08/18/2021	08/25/2021	zolpidem tartrate 10 mg tablet	30	30	AnnamariaHanh medina MD	[id number]	[facility]
Surgery Transfer Acceptance Note    Patient seen in 8S 865A after transfer from SICU. Patient is stable on the floor and in no acute distress at this time. Due to recent bleeding activity, will hold DVT prophylaxis for 24 hrs.    SUBJECTIVE:   Pt seen and examined at bedside. Patient comfortable and in no-apparent distress. Reports being hungry and thirsty as his main complaints at this time. No nausea, vomiting. Pain is controlled. +Gas/+BM in ostomy bag. Currently NPO    OBJECTIVE:  Vital Signs Last 24 Hrs  T(C): 36.8 (17 Aug 2022 13:48), Max: 39.1 (16 Aug 2022 14:40)  T(F): 98.3 (17 Aug 2022 13:48), Max: 102.4 (16 Aug 2022 14:40)  HR: 109 (17 Aug 2022 13:48) (95 - 125)  BP: 149/98 (17 Aug 2022 13:48) (93/66 - 154/97)  BP(mean): 105 (17 Aug 2022 12:00) (77 - 113)  RR: 17 (17 Aug 2022 13:48) (14 - 21)  SpO2: 100% (17 Aug 2022 13:48) (93% - 100%)    Physical Exam:  General Appearance: Appears well, NAD  Respiratory: No labored breathing  CV: Pulse regularly present  Abdomen: Soft, nontender, stool/gas in ostomy bag, Anal packing was changed this AM, Now moderately stained with blood.    LABS:                        9.7    14.65 )-----------( 267      ( 17 Aug 2022 01:00 )             28.3     08-17    137  |  104  |  9   ----------------------------<  142<H>  3.7   |  24  |  0.67    Ca    8.3<L>      17 Aug 2022 01:00  Phos  3.7     08-17  Mg     1.70     08-17    TPro  5.7<L>  /  Alb  2.6<L>  /  TBili  1.1  /  DBili  x   /  AST  13  /  ALT  11  /  AlkPhos  71  08-16    PT/INR - ( 17 Aug 2022 01:00 )   PT: 21.2 sec;   INR: 1.82 ratio   PTT - ( 17 Aug 2022 01:00 )  PTT:27.6 sec    MEDICATIONS  (STANDING):  amLODIPine   Tablet 5 milliGRAM(s) Oral daily  atorvastatin 10 milliGRAM(s) Oral at bedtime  bictegravir 50 mG/emtricitabine 200 mG/tenofovir alafenamide 25 mG (BIKTARVY) 1 Tablet(s) Oral daily  lactated ringers. 1000 milliLiter(s) (100 mL/Hr) IV Continuous <Continuous>  metoprolol succinate ER 50 milliGRAM(s) Oral daily  OLANZapine 5 milliGRAM(s) Oral at bedtime    MEDICATIONS  (PRN):  HYDROmorphone  Injectable 1 milliGRAM(s) IV Push every 4 hours PRN Severe Pain (7 - 10)  HYDROmorphone  Injectable 0.5 milliGRAM(s) IV Push every 4 hours PRN Moderate Pain (4 - 6)    Assessment:   Patient is a 57 year old male with Hx rectal/prostate cancer s/p radiation, hx of sigmoid colostomy in March with Dr. Avilez, now presents with lower GI bleed that began at 9pm yesterday 8/15    Plan:  - monitor packing in rectum (kerlix and surgicel) and change as necessary  - hold off on repeat CTA for now (OSH shows no blush); however if patient continuing to bleed obtain CTA  - transfuse PRN; currently hemodynamically stable  - hold DVT ppx for 24 hours
Case discussed with IR attendings. Dr Pagan discussed case with Dr. Avilez with discussion with Dr. Kumar. CTA was reviewed and there is no active extravasation to target for embolization at this time. Please reach out to IR if patient condition destabilizes and obtain another CTA.
Patient will be transferred from SICU to [address] [place] [address], signed out to [facility]. Per primary team, continue holding DVT prophylaxis x 24 hours.

## 2022-08-17 NOTE — PROGRESS NOTE ADULT - SUBJECTIVE AND OBJECTIVE BOX
Reason for consult: h/o prostate cancer    HPI:  57 year old male with Hx rectal/prostate cancer s/p radiation, hx of sigmoid colostomy in March with Dr. Avilez, now presents with lower GI bleed that began at 9pm yesterday 8/15. Patient was transferred from [facility], s/p 2 unit PRBC. MTP initiated and patient received additional 2 PRBC, 1 FFP, and 1 plt. At time of encounter, tachycardic to 130s, SBP 100s. Mentating appropriately, denies dizziness. Large clot from rectum removed, and rectum packed with kerlix wrapped in surgicel. Patient scheduled abdominal peritoneal resection with Dr. Betancourt on August 24th.    (16 Aug 2022 15:07)    Hematology/Oncology called to see patient who is under care of Dr. Manuel Mathews of [facility] for the management of prostate cancer. Patient had a history of [place] 7 prostate cancer in 04/2014 s/p IMRT, with recurrence in 01/2021. He has been receiving Lupron every 3 months and his PSA has been undetectable since 10/2021. Patient seen this afternoon, reports feeling fine aside from rectal bleeding.       PAST MEDICAL & SURGICAL HISTORY:      FAMILY HISTORY:      Alochol: Denied  Smoking: Nonsmoker  Drug Use: Denied  Marital Status:         Allergies    Allergy Status Unknown    Intolerances        MEDICATIONS  (STANDING):  amLODIPine   Tablet 5 milliGRAM(s) Oral daily  atorvastatin 10 milliGRAM(s) Oral at bedtime  bictegravir 50 mG/emtricitabine 200 mG/tenofovir alafenamide 25 mG (BIKTARVY) 1 Tablet(s) Oral daily  lactated ringers. 1000 milliLiter(s) (100 mL/Hr) IV Continuous <Continuous>  metoprolol succinate ER 50 milliGRAM(s) Oral daily  OLANZapine 5 milliGRAM(s) Oral at bedtime    MEDICATIONS  (PRN):  HYDROmorphone  Injectable 1 milliGRAM(s) IV Push every 4 hours PRN Severe Pain (7 - 10)  HYDROmorphone  Injectable 0.5 milliGRAM(s) IV Push every 4 hours PRN Moderate Pain (4 - 6)      ROS  No fever, sweats, chills  No epistaxis, HA, sore throat  No CP, SOB, cough, sputum  No n/v/d, abd pain,  +Rectal bleeding   No edema  No rash  No anxiety  No back pain, joint pain  No dysuria, hematuria    T(C): 37.1 (08-17-22 @ 15:31), Max: 37.8 (08-17-22 @ 00:00)  HR: 112 (08-17-22 @ 15:31) (95 - 112)  BP: 144/80 (08-17-22 @ 15:31) (93/66 - 154/97)  RR: 16 (08-17-22 @ 15:31) (14 - 21)  SpO2: 100% (08-17-22 @ 15:31) (93% - 100%)  Wt(kg): --    PE  NAD  Awake, alert  Anicteric, MMM  RRR  CTAB  Abd soft, NT, ND. Colostomy bag in place, stoma clean and dry   No c/c/e  No rash grossly  FROM                          9.7    14.65 )-----------( 267      ( 17 Aug 2022 01:00 )             28.3       08-17    137  |  104  |  9   ----------------------------<  142<H>  3.7   |  24  |  0.67    Ca    8.3<L>      17 Aug 2022 01:00  Phos  3.7     08-17  Mg     1.70     08-17    TPro  5.7<L>  /  Alb  2.6<L>  /  TBili  1.1  /  DBili  x   /  AST  13  /  ALT  11  /  AlkPhos  71  08-16   Patient requests all Lab and Radiology Results on their Discharge Instructions

## 2022-08-17 NOTE — PROGRESS NOTE ADULT - ASSESSMENT
57 year old male with Hx rectal/prostate cancer s/p radiation, hx of sigmoid colostomy in March with Dr. Avilez, now presents with lower GI bleed that began at 9pm yesterday 8/15    Plan:  - monitor packing in rectum (kerlix and surgicel) and change as necessary  - hold off on repeat CTA for now (OSH shows no blush); however if patient continuing to bleed obtain CTA  - transfuse PRN  - hold DVT ppx for 24 hours   - no acute surgical intervention at this time.  - appreciate SICU care    [facility], [id number]

## 2022-08-17 NOTE — PROGRESS NOTE ADULT - ASSESSMENT
57 year old male with Hx rectal/prostate cancer s/p radiation, hx of sigmoid colostomy in March with Dr. Avilez, now presents with lower GI bleed that began at 9pm yesterday 8/15. Patient was transferred from [facility], s/p 2 unit PRBC. MTP initiated and patient received additional 2 PRBC, 1 FFP, and 1 plt. At time of encounter, tachycardic to 130s, SBP 100s. Mentating appropriately, denies dizziness. Large clot from rectum removed, and rectum packed with kerlix wrapped in surgicel. Patient scheduled abdominal peritoneal resection with Dr. Betancourt on August 24th.     Hematology/Oncology called to see patient who is under care of Dr. Manuel Mathews of [facility] for the management of prostate cancer. Patient had a history of [place] 7 prostate cancer in 04/2014 s/p IMRT, with recurrence in 01/2021. He has been receiving Lupron every 3 months and his PSA has been undetectable since 10/2021. Patient seen this afternoon, reports feeling fine aside from rectal bleeding.     1. Prostate cancer with history of recurrence   - Receives Lupron q3 months, last dose 07/13/22  - PSA undetectable since 10/2021   - Continued management with Dr. Mathews after discharge     2. Anemia, lower GI bleed, h/o sigmoid colostomy    - Transferred to [facility] after multiple transfusions at [facility]   - Per IR no embolization at this time   - Per colorectal surgery no acute surgical intervention at this time   - Transfuse to maintain hgb >7.0 grams     Constance Brush PA-C  Hematology/Oncology  [facility]  [phone] (office)  [phone] (alt office)  Evenings and weekends please call MD on call or office

## 2022-08-17 NOTE — PROGRESS NOTE ADULT - SUBJECTIVE AND OBJECTIVE BOX
INTERVAL EVENTS:  - No transfusion overnight  - Rectal Packing remained in place  - Resumed biktarvy, olanzapine, metoprolol, atorvastatin    SUBJECTIVE/ROS:  [ ] A ten-point review of systems was otherwise negative except as noted.  [ ] Due to altered mental status/intubation, subjective information were not able to be obtained from the patient. History was obtained, to the extent possible, from review of the chart and collateral sources of information.    ICU Vital Signs Last 24 Hrs  T(C): 37.1 (16 Aug 2022 20:00), Max: 39.1 (16 Aug 2022 14:40)  T(F): 98.7 (16 Aug 2022 20:00), Max: 102.4 (16 Aug 2022 14:40)  HR: 106 (16 Aug 2022 23:00) (104 - 140)  BP: 107/71 (16 Aug 2022 23:00) (93/66 - 154/97)  BP(mean): 83 (16 Aug 2022 23:00) (77 - 113)  ABP: --  ABP(mean): --  RR: 16 (16 Aug 2022 23:00) (15 - 21)  SpO2: 96% (16 Aug 2022 23:00) (96% - 100%)    O2 Parameters below as of 16 Aug 2022 19:00  Patient On (Oxygen Delivery Method): room air        I&O's Detail    16 Aug 2022 07:01  -  17 Aug 2022 01:35  --------------------------------------------------------  IN:    IV PiggyBack: 200 mL    Lactated Ringers: 1125 mL  Total IN: 1325 mL    OUT:    Colostomy (mL): 50 mL    Voided (mL): 1750 mL  Total OUT: 1800 mL    Total NET: -475 mL        PHYSICAL EXAM:  GENERAL: NAD, well-groomed, well-developed  HEAD:  Atraumatic, Normocephalic  EYES: EOMI, PERRLA, conjunctiva and sclera clear  ENMT: No tonsillar erythema, exudates, or enlargement; Moist mucous membranes  NECK: Supple, No JVD, Normal thyroid  HEART: Regular rate and rhythm; No murmurs, rubs, or gallops  RESPIRATORY: CTA B/L, No W/R/R  ABDOMEN: Soft, Nontender, ostomy with SS bowel sweat  Rectum: Large clot removed from rectum, clot in rectal vault, no arterial spurting noted, packing with surgicel and kerlix at bedside.      ACCESS DEVICES:  [ x] Peripheral IV  [ ] Central Venous Line	[ ] R	[ ] L	[ ] IJ	[ ] Fem	[ ] SC	Placed:   [ ] Arterial Line		[ ] R	[ ] L	[ ] Fem	[ ] Rad	[ ] Ax	Placed:   [ ] PICC:					[ ] Mediport  [ ] Urinary Catheter, Date Placed:   [ ] Necessity of urinary, arterial, and venous catheters discussed    OTHER MEDICATIONS:  chlorhexidine 4% Liquid 1 Application(s) Topical daily      CODE STATUS:     IMAGING:

## 2022-08-17 NOTE — PROGRESS NOTE ADULT - ASSESSMENT
ASSESSMENT:  57y Male with Hx rectal cancer s/p radiation, now presents with active lower GI bleeding requiring 4pRBC, 1 FFP, 1 plt. SICU consulted for hemodynamic monitoring.     NEUROLOGIC   - Pain control: Dilaudid PRN  - olanzapine (home med)    RESPIRATORY   - Monitor SpO2 goal >92%    CARDIOVASCULAR   - S/p 4pRBC, 1 FFP, 1 plt  - No further transfusion overnight   - Monitor hemodynamics and transfuse as needed  - MAP <65 unsupported   - Tachy 100s, home metoprolol restated  - Home atorvastatin restarted   - Home Amlodipine HELD     GASTROINTESTINAL   - Diet: NPO except meds    /RENAL   - IV fluids: LR @ 125cc/hr  - Strict I/Os  - Monitor BMP qd  - Monitor electrolytes, replete PRN    HEMATOLOGIC  - Monitor H/H   - Holding DVT prophylaxis 2/2 active bleed    INFECTIOUS DISEASE  - Afebrile since 8/16  - Home Biktarvy restarted   - Tylenol ATC    ENDOCRINE  - Monitor gluc    LINES  - PIV     DISPO: SICU ASSESSMENT:  57y Male with Hx rectal cancer s/p radiation, now presents with active lower GI bleeding requiring 4pRBC, 1 FFP, 1 plt. SICU consulted for hemodynamic monitoring.     NEUROLOGIC   - Pain control: Dilaudid PRN  - olanzapine (home med)    RESPIRATORY   - Monitor SpO2 goal >92%    CARDIOVASCULAR   - S/p 4pRBC, 1 FFP, 1 plt  - No further transfusion overnight   - Monitor hemodynamics and transfuse as needed  - MAP <65 unsupported   - Tachy 100s, home metoprolol restated  - Home atorvastatin restarted   - Home Amlodipine HELD     GASTROINTESTINAL   - Diet: NPO except meds    /RENAL   - IV fluids: LR @ 125cc/hr  - Strict I/Os  - Monitor BMP qd  - Monitor electrolytes, replete PRN    HEMATOLOGIC  - Monitor H/H   - consider restarting DVT prophylaxis    INFECTIOUS DISEASE  - Afebrile since 8/16  - Home Biktarvy restarted     ENDOCRINE  - Monitor gluc    LINES  - PIV     DISPO: SICU

## 2022-08-18 DIAGNOSIS — F41.9 ANXIETY DISORDER, UNSPECIFIED: ICD-10-CM

## 2022-08-18 DIAGNOSIS — I10 ESSENTIAL (PRIMARY) HYPERTENSION: ICD-10-CM

## 2022-08-18 DIAGNOSIS — Z29.9 ENCOUNTER FOR PROPHYLACTIC MEASURES, UNSPECIFIED: ICD-10-CM

## 2022-08-18 DIAGNOSIS — B20 HUMAN IMMUNODEFICIENCY VIRUS [HIV] DISEASE: ICD-10-CM

## 2022-08-18 DIAGNOSIS — C61 MALIGNANT NEOPLASM OF PROSTATE: ICD-10-CM

## 2022-08-18 DIAGNOSIS — K92.2 GASTROINTESTINAL HEMORRHAGE, UNSPECIFIED: ICD-10-CM

## 2022-08-18 DIAGNOSIS — C20 MALIGNANT NEOPLASM OF RECTUM: ICD-10-CM

## 2022-08-18 LAB
A1C WITH ESTIMATED AVERAGE GLUCOSE RESULT: 5.3 % — SIGNIFICANT CHANGE UP (ref 4–5.6)
ANION GAP SERPL CALC-SCNC: 13 MMOL/L — SIGNIFICANT CHANGE UP (ref 7–14)
APTT BLD: 26.9 SEC — LOW (ref 27–36.3)
BUN SERPL-MCNC: 10 MG/DL — SIGNIFICANT CHANGE UP (ref 7–23)
CALCIUM SERPL-MCNC: 8.9 MG/DL — SIGNIFICANT CHANGE UP (ref 8.4–10.5)
CHLORIDE SERPL-SCNC: 99 MMOL/L — SIGNIFICANT CHANGE UP (ref 98–107)
CO2 SERPL-SCNC: 25 MMOL/L — SIGNIFICANT CHANGE UP (ref 22–31)
CREAT SERPL-MCNC: 0.56 MG/DL — SIGNIFICANT CHANGE UP (ref 0.5–1.3)
EGFR: 116 ML/MIN/1.73M2 — SIGNIFICANT CHANGE UP
ESTIMATED AVERAGE GLUCOSE: 105 — SIGNIFICANT CHANGE UP
GLUCOSE SERPL-MCNC: 110 MG/DL — HIGH (ref 70–99)
HCT VFR BLD CALC: 29.8 % — LOW (ref 39–50)
HGB BLD-MCNC: 9.8 G/DL — LOW (ref 13–17)
INR BLD: 1.55 RATIO — HIGH (ref 0.88–1.16)
MAGNESIUM SERPL-MCNC: 1.9 MG/DL — SIGNIFICANT CHANGE UP (ref 1.6–2.6)
MCHC RBC-ENTMCNC: 27.8 PG — SIGNIFICANT CHANGE UP (ref 27–34)
MCHC RBC-ENTMCNC: 32.9 GM/DL — SIGNIFICANT CHANGE UP (ref 32–36)
MCV RBC AUTO: 84.4 FL — SIGNIFICANT CHANGE UP (ref 80–100)
NRBC # BLD: 0 /100 WBCS — SIGNIFICANT CHANGE UP (ref 0–0)
NRBC # FLD: 0 K/UL — SIGNIFICANT CHANGE UP (ref 0–0)
PHOSPHATE SERPL-MCNC: 3.4 MG/DL — SIGNIFICANT CHANGE UP (ref 2.5–4.5)
PLATELET # BLD AUTO: 301 K/UL — SIGNIFICANT CHANGE UP (ref 150–400)
POTASSIUM SERPL-MCNC: 3.2 MMOL/L — LOW (ref 3.5–5.3)
POTASSIUM SERPL-SCNC: 3.2 MMOL/L — LOW (ref 3.5–5.3)
PROTHROM AB SERPL-ACNC: 18.1 SEC — HIGH (ref 10.5–13.4)
RBC # BLD: 3.53 M/UL — LOW (ref 4.2–5.8)
RBC # FLD: 15.1 % — HIGH (ref 10.3–14.5)
SODIUM SERPL-SCNC: 137 MMOL/L — SIGNIFICANT CHANGE UP (ref 135–145)
WBC # BLD: 13.15 K/UL — HIGH (ref 3.8–10.5)
WBC # FLD AUTO: 13.15 K/UL — HIGH (ref 3.8–10.5)

## 2022-08-18 PROCEDURE — 93010 ELECTROCARDIOGRAM REPORT: CPT

## 2022-08-18 PROCEDURE — 99223 1ST HOSP IP/OBS HIGH 75: CPT | Mod: GC

## 2022-08-18 RX ORDER — ACETAMINOPHEN 500 MG
1000 TABLET ORAL EVERY 6 HOURS
Refills: 0 | Status: COMPLETED | OUTPATIENT
Start: 2022-08-18 | End: 2022-08-18

## 2022-08-18 RX ORDER — POTASSIUM CHLORIDE 20 MEQ
40 PACKET (EA) ORAL EVERY 4 HOURS
Refills: 0 | Status: COMPLETED | OUTPATIENT
Start: 2022-08-18 | End: 2022-08-18

## 2022-08-18 RX ADMIN — Medication 400 MILLIGRAM(S): at 06:21

## 2022-08-18 RX ADMIN — HYDROMORPHONE HYDROCHLORIDE 0.5 MILLIGRAM(S): 2 INJECTION INTRAMUSCULAR; INTRAVENOUS; SUBCUTANEOUS at 02:12

## 2022-08-18 RX ADMIN — Medication 40 MILLIEQUIVALENT(S): at 16:25

## 2022-08-18 RX ADMIN — Medication 50 MILLIGRAM(S): at 05:06

## 2022-08-18 RX ADMIN — HYDROMORPHONE HYDROCHLORIDE 0.5 MILLIGRAM(S): 2 INJECTION INTRAMUSCULAR; INTRAVENOUS; SUBCUTANEOUS at 06:13

## 2022-08-18 RX ADMIN — AMLODIPINE BESYLATE 5 MILLIGRAM(S): 2.5 TABLET ORAL at 13:58

## 2022-08-18 RX ADMIN — ENOXAPARIN SODIUM 40 MILLIGRAM(S): 100 INJECTION SUBCUTANEOUS at 11:19

## 2022-08-18 RX ADMIN — HYDROMORPHONE HYDROCHLORIDE 0.5 MILLIGRAM(S): 2 INJECTION INTRAMUSCULAR; INTRAVENOUS; SUBCUTANEOUS at 06:43

## 2022-08-18 RX ADMIN — Medication 1000 MILLIGRAM(S): at 12:37

## 2022-08-18 RX ADMIN — Medication 400 MILLIGRAM(S): at 23:27

## 2022-08-18 RX ADMIN — Medication 1000 MILLIGRAM(S): at 23:57

## 2022-08-18 RX ADMIN — Medication 400 MILLIGRAM(S): at 17:17

## 2022-08-18 RX ADMIN — BICTEGRAVIR SODIUM, EMTRICITABINE, AND TENOFOVIR ALAFENAMIDE FUMARATE 1 TABLET(S): 30; 120; 15 TABLET ORAL at 11:55

## 2022-08-18 RX ADMIN — Medication 1000 MILLIGRAM(S): at 18:02

## 2022-08-18 RX ADMIN — Medication 40 MILLIEQUIVALENT(S): at 11:19

## 2022-08-18 RX ADMIN — Medication 400 MILLIGRAM(S): at 11:20

## 2022-08-18 RX ADMIN — Medication 1000 MILLIGRAM(S): at 06:51

## 2022-08-18 RX ADMIN — ATORVASTATIN CALCIUM 10 MILLIGRAM(S): 80 TABLET, FILM COATED ORAL at 23:27

## 2022-08-18 RX ADMIN — HYDROMORPHONE HYDROCHLORIDE 0.5 MILLIGRAM(S): 2 INJECTION INTRAMUSCULAR; INTRAVENOUS; SUBCUTANEOUS at 01:42

## 2022-08-18 RX ADMIN — Medication 3 MILLIGRAM(S): at 23:27

## 2022-08-18 RX ADMIN — OLANZAPINE 5 MILLIGRAM(S): 15 TABLET, FILM COATED ORAL at 23:27

## 2022-08-18 NOTE — CONSULT NOTE ADULT - PROBLEM SELECTOR RECOMMENDATION 7
- cont atorvastatin 10 mg QHS  - would hold off aspirin given recent GIB, pending procedure, and no significant cardiac history currently

## 2022-08-18 NOTE — PROGRESS NOTE ADULT - SUBJECTIVE AND OBJECTIVE BOX
Overnight Events:   A TEAM Surgery Progress Note  Patient is a 57y old  Male who presents with a chief complaint of Rectal Bleeding (18 Aug 2022 09:12)    INTERVAL EVENTS: No acute events overnight.    SUBJECTIVE: Patient seen and examined at bedside with surgical team, patient without complaints. Denies abdominal pain. Denies nausea, vomiting. Denies blood per rectum o/n.  Denies fever, chills, CP, SOB.    OBJECTIVE:    Vital Signs Last 24 Hrs  T(C): 37.5 (18 Aug 2022 09:53), Max: 37.5 (18 Aug 2022 09:53)  T(F): 99.5 (18 Aug 2022 09:53), Max: 99.5 (18 Aug 2022 09:53)  HR: 94 (18 Aug 2022 09:53) (94 - 113)  BP: 133/100 (18 Aug 2022 09:53) (127/87 - 149/98)  BP(mean): --  RR: 18 (18 Aug 2022 09:53) (16 - 18)  SpO2: 100% (18 Aug 2022 09:53) (99% - 100%)    Parameters below as of 18 Aug 2022 09:53  Patient On (Oxygen Delivery Method): room air    I&O's Detail    17 Aug 2022 07:01  -  18 Aug 2022 07:00  --------------------------------------------------------  IN:    IV PiggyBack: 50 mL    Lactated Ringers: 525 mL  Total IN: 575 mL    OUT:    Colostomy (mL): 50 mL    Voided (mL): 2500 mL  Total OUT: 2550 mL    Total NET: -1975 mL      MEDICATIONS  (STANDING):  acetaminophen   IVPB .. 1000 milliGRAM(s) IV Intermittent every 6 hours  amLODIPine   Tablet 5 milliGRAM(s) Oral daily  atorvastatin 10 milliGRAM(s) Oral at bedtime  bictegravir 50 mG/emtricitabine 200 mG/tenofovir alafenamide 25 mG (BIKTARVY) 1 Tablet(s) Oral daily  enoxaparin Injectable 40 milliGRAM(s) SubCutaneous every 24 hours  lactated ringers. 1000 milliLiter(s) (100 mL/Hr) IV Continuous <Continuous>  melatonin 3 milliGRAM(s) Oral at bedtime  metoprolol succinate ER 50 milliGRAM(s) Oral daily  OLANZapine 5 milliGRAM(s) Oral at bedtime  potassium chloride   Powder 40 milliEquivalent(s) Oral every 4 hours    MEDICATIONS  (PRN):  HYDROmorphone  Injectable 1 milliGRAM(s) IV Push every 4 hours PRN Severe Pain (7 - 10)  HYDROmorphone  Injectable 0.5 milliGRAM(s) IV Push every 4 hours PRN Moderate Pain (4 - 6)  ondansetron Injectable 4 milliGRAM(s) IV Push every 6 hours PRN Nausea and/or Vomiting      PHYSICAL EXAM:  Constitutional: A&Ox3, NAD  Respiratory: Unlabored breathing  Abdomen: Soft, nondistended, NTTP. No rebound or guarding. Ostomy pink with gas, scant stool in bag.  Extremities: WWP, VASQUEZ spontaneously    LABS:                        9.8    13.15 )-----------( 301      ( 18 Aug 2022 06:05 )             29.8     08-18    137  |  99  |  10  ----------------------------<  110<H>  3.2<L>   |  25  |  0.56    Ca    8.9      18 Aug 2022 06:05  Phos  3.4     08-18  Mg     1.90     08-18    TPro  5.7<L>  /  Alb  2.6<L>  /  TBili  1.1  /  DBili  x   /  AST  13  /  ALT  11  /  AlkPhos  71  08-16    PT/INR - ( 18 Aug 2022 06:05 )   PT: 18.1 sec;   INR: 1.55 ratio         PTT - ( 18 Aug 2022 06:05 )  PTT:26.9 sec  LIVER FUNCTIONS - ( 16 Aug 2022 14:00 )  Alb: 2.6 g/dL / Pro: 5.7 g/dL / ALK PHOS: 71 U/L / ALT: 11 U/L / AST: 13 U/L / GGT: x             ABO Interpretation: O (08-18-22 @ 07:35)

## 2022-08-18 NOTE — CONSULT NOTE ADULT - PROBLEM SELECTOR RECOMMENDATION 9
Pt with BRBPR noted during initial admission and in his ostomy bag during initial admission, likely lower GIB requiring MTP at outside hospital, now s/p 4 units pRBC, 1 unit FFP, and 1 unit PLT. Patient denies having prior episodes of GIB in his past history, no new medications nor NSAID usage to exacerbate a bleed  - Transfuse PRN Hgb>7  - Had some clots noted in rectum initially after transfer  - Has not needed transfusions since transfer to [facility], Hgb stabilized around 10s currently, no blood noted in patient's ostomy bag this morning  - Planned for abdominal peritoneal resection and permanent colostomy    #Medical Clearance  RCRI score of Pt with BRBPR noted during initial admission and in his ostomy bag during initial admission, likely lower GIB requiring MTP at outside hospital, now s/p 4 units pRBC, 1 unit FFP, and 1 unit PLT. Patient denies having prior episodes of GIB in his past history, no new medications nor NSAID usage to exacerbate a bleed  - Transfuse PRN Hgb>7  - Had some clots noted in rectum initially after transfer  - Has not needed transfusions since transfer to [facility], Hgb stabilized around 10s currently, no blood noted in patient's ostomy bag this morning  - Planned for abdominal peritoneal resection and permanent colostomy    #Medical Clearance  RCRI score of 1 (Class II Risk, 6.0% 30-day risk of death, MI, or cardiac arrest). Patient able to walk up 2 flights of stairs without difficulties. No significant cardiac nor pulmonary past medical history for patient. Per outpatient chart review, patient was also seen by cardiology outpatient for EKG c/f right axis deviation. Cleared by cardiology outpatient with echo showing LVEF>60%, no RVH, and thickened pericardium possibly representing residual pericarditis scarring.   Patient is medically optimized for abdominal peritoneal resection and permanent colostomy.

## 2022-08-18 NOTE — CONSULT NOTE ADULT - ASSESSMENT
57M with HIV (on Biktarvy), hx of rectal/prostate cancer s/p radiation and sigmoid colostomy in March presenting as a transfer from [place] for lower GI Bleed, s/p 4 units of pRBC, 1 unit FFP, and 1 unit of PLT at outside hospital, requiring no transfusions since transfer, planned for abdominal peritoneal resection on August 24th, Medicine consulted for medical clearance.    ***INCOMPLETE NOTE until attending signature*** 57M with HIV (on Biktarvy), hx of rectal/prostate cancer s/p radiation and sigmoid colostomy in March presenting as a transfer from [place] for lower GI Bleed, s/p 4 units of pRBC, 1 unit FFP, and 1 unit of PLT at outside hospital, requiring no transfusions since transfer, planned for abdominal peritoneal resection and permanent colostomy, Medicine consulted for medical clearance.    ***INCOMPLETE NOTE until attending signature***

## 2022-08-18 NOTE — CONSULT NOTE ADULT - PROBLEM/RECOMMENDATION-4
Problem: INFECTION - ADULT  Goal: Absence or prevention of progression during hospitalization  Description: INTERVENTIONS:  - Assess and monitor for signs and symptoms of infection  - Monitor lab/diagnostic results  - Monitor all insertion sites, i e  indwelling lines, tubes, and drains  - Monitor endotracheal if appropriate and nasal secretions for changes in amount and color  - [place] appropriate cooling/warming therapies per order  - Administer medications as ordered  - Instruct and encourage patient and family to use good hand hygiene technique  - Identify and instruct in appropriate isolation precautions for identified infection/condition  Outcome: Progressing     Problem: Knowledge Deficit  Goal: Patient/family/caregiver demonstrates understanding of disease process, treatment plan, medications, and discharge instructions  Description: Complete learning assessment and assess knowledge base    Interventions:  - Provide teaching at level of understanding  - Provide teaching via preferred learning methods  Outcome: Progressing
DISPLAY PLAN FREE TEXT

## 2022-08-18 NOTE — PROGRESS NOTE ADULT - ASSESSMENT
57 year old male with Hx rectal/prostate cancer s/p radiation, hx of sigmoid colostomy in March with Dr. Avilez, now presents with lower GI bleed that began at 9pm 8/15. patient was admitted to SICU for hemodynamic monitoring s/p- S/p 4pRBC, 1 FFP, 1 plt. Patient stable and transferred to floor on 8/18    Plan:  - monitor packing in rectum (kerlix and surgicel) and change as necessary  -IVF: LR 100ml/hr  -Pain Control with PRN dilaudid  -Nausea control PRN   -Diet: NPO  -DVT Prophylaxis: Lovenox  -OOB and ambulate  -Dispo:     A Team Surgery, [id number]   57 year old male with Hx rectal/prostate cancer s/p radiation, hx of sigmoid colostomy in March with Dr. Avilez, now presents with lower GI bleed that began at 9pm 8/15. patient was admitted to SICU for hemodynamic monitoring s/p- S/p 4pRBC, 1 FFP, 1 plt. Patient stable and transferred to floor on 8/18    Plan:  - OR planning, medicine consult for clearance  - Monitor packing in rectum (kerlix and surgicel) and change daily  - IVF: LR 100ml/hr  - Pain Control with Tylenol, PRN dilaudid  - Nausea control PRN   - Diet: NPO  - DVT Prophylaxis: Lovenox  - OOB and ambulate  - Dispo:     A Team Surgery  [id number]

## 2022-08-18 NOTE — CONSULT NOTE ADULT - SUBJECTIVE AND OBJECTIVE BOX
CARL ROWLEY  [id number]    HISTORY OF PRESENT ILLNESS:  Patient is a 57 year-old male with HIV (on Biktarvy), hx of rectal/prostate cancer s/p radiation and sigmoid colostomy in March presenting as a transfer from [place] for lower GI Bleed. Per H&P, patient reportedly received 2 units of pRBC initially at [facility], with subsequent activation of MTP and an additional 2 units of pRBC, 1 FFP, and 1 PLT. After his transfer, patient was seen by surgery and noted to be tachycardic to 130s and SBP of 100s but mentating appropriately and without significant complaints of dizziness. A large clot was removed from his rectum and rectum packed with Kerlix wrapped in Surgicel.     HOSPITAL COURSE:  Patient initially admitted to SICU for hemorrhage watch. Multi-specialty discussion held where it was determined that there was no active extravasation seen on patient's CTA to target for embolization by IR.  DVT prophylaxis held for 24 hours due to recent bleeding and patient transferred to floors from SICU after no acute events.   Per heme/onc note, patient is scheduled for abdominal peritoneal resection with Dr. Betancourt on August 24th.     PAST MEDICAL & SURGICAL HISTORY:  History of [place] 7 Prostate cancer in 2014 s/p IMRT, with recurrence in 2021 on Lupron every 3 months with undetectable PSA.  History of rectal cancer also s/p radiation therapy.  History of HIV on Biktarvy.    Surgical history of sigmoid colostomy (March 2022).      Review of Systems:  Gen:  No fevers/chills, weight loss  HEENT: No blurry vision, no difficulty swallowing, no oral or nasal ulcers  CVS: No chest pain/palpitations  Resp: No SOB/wheezing  GI: No N/V/C/D/abdominal pain  MSK:  Skin: No new rashes  Neuro: No headaches    MEDICATIONS  (STANDING):  acetaminophen   IVPB .. 1000 milliGRAM(s) IV Intermittent every 6 hours  amLODIPine   Tablet 5 milliGRAM(s) Oral daily  atorvastatin 10 milliGRAM(s) Oral at bedtime  bictegravir 50 mG/emtricitabine 200 mG/tenofovir alafenamide 25 mG (BIKTARVY) 1 Tablet(s) Oral daily  enoxaparin Injectable 40 milliGRAM(s) SubCutaneous every 24 hours  lactated ringers. 1000 milliLiter(s) (100 mL/Hr) IV Continuous <Continuous>  melatonin 3 milliGRAM(s) Oral at bedtime  metoprolol succinate ER 50 milliGRAM(s) Oral daily  OLANZapine 5 milliGRAM(s) Oral at bedtime    MEDICATIONS  (PRN):  HYDROmorphone  Injectable 1 milliGRAM(s) IV Push every 4 hours PRN Severe Pain (7 - 10)  HYDROmorphone  Injectable 0.5 milliGRAM(s) IV Push every 4 hours PRN Moderate Pain (4 - 6)  ondansetron Injectable 4 milliGRAM(s) IV Push every 6 hours PRN Nausea and/or Vomiting      Allergies    Allergy Status Unknown    Intolerances        PERTINENT MEDICATION HISTORY:  Alprazolam 0.5 mg TID PRN  Amlodipine 5 mg QD  ASA 81 mg QD  Atorvastatin 10 mg QHS  Biktarvy [id number] QD  Hydromorphone 8 mg Q4H PRN  Metoprolol Succinate ER 50 mg QD  Olanzapine 5 mg QD  Zolpidem 10 mg QHS      SOCIAL HISTORY:  OCCUPATION:  TRAVEL HISTORY:    FAMILY HISTORY:      Vital Signs Last 24 Hrs  T(C): 37.1 (18 Aug 2022 05:01), Max: 37.3 (17 Aug 2022 21:30)  T(F): 98.8 (18 Aug 2022 05:01), Max: 99.2 (17 Aug 2022 21:30)  HR: 106 (18 Aug 2022 05:01) (100 - 113)  BP: 127/87 (18 Aug 2022 05:01) (127/87 - 149/98)  BP(mean): 105 (17 Aug 2022 12:00) (103 - 105)  RR: 18 (18 Aug 2022 05:01) (16 - 18)  SpO2: 99% (18 Aug 2022 05:01) (96% - 100%)    Parameters below as of 18 Aug 2022 05:01  Patient On (Oxygen Delivery Method): room air      Physical Exam:  General: No apparent distress  HEENT: EOMI, MMM  CVS: +S1/S2, RRR, no murmurs/rubs/gallops  Resp: CTA b/l. No crackles/wheezing  GI: Soft, NT/ND +BS  MSK:  Neuro: AAOx3  Skin: no visible rashes    LABS:                        9.8    13.15 )-----------( 301      ( 18 Aug 2022 06:05 )             29.8     08-18    137  |  99  |  10  ----------------------------<  110<H>  3.2<L>   |  25  |  0.56    Ca    8.9      18 Aug 2022 06:05  Phos  3.4     08-18  Mg     1.90     08-18    TPro  5.7<L>  /  Alb  2.6<L>  /  TBili  1.1  /  DBili  x   /  AST  13  /  ALT  11  /  AlkPhos  71  08-16    PT/INR - ( 18 Aug 2022 06:05 )   PT: 18.1 sec;   INR: 1.55 ratio         PTT - ( 18 Aug 2022 06:05 )  PTT:26.9 sec      RADIOLOGY & ADDITIONAL STUDIES:     CARL ROWLEY  [id number]    HISTORY OF PRESENT ILLNESS:  Patient is a 57 year-old male with HIV (on Biktarvy), anxiety, hx of rectal/prostate cancer s/p radiation and sigmoid colostomy in March presenting as a transfer from [place] for lower GI Bleed. Per H&P, patient reportedly received 2 units of pRBC initially at [facility], with subsequent activation of MTP and an additional 2 units of pRBC, 1 FFP, and 1 PLT. After his transfer, patient was seen by surgery and noted to be tachycardic to 130s and SBP of 100s but mentating appropriately and without significant complaints of dizziness. A large clot was removed from his rectum and rectum packed with Kerlix wrapped in Surgicel.     HOSPITAL COURSE:  Patient initially admitted to SICU for hemorrhage watch. Multi-specialty discussion held where it was determined that there was no active extravasation seen on patient's CTA to target for embolization by IR.  DVT prophylaxis held for 24 hours due to recent bleeding and patient transferred to floors from SICU after no acute events.   Per heme/onc note, patient is scheduled for abdominal peritoneal resection with Dr. Betancourt on August 24th.     PAST MEDICAL & SURGICAL HISTORY:  History of [place] 7 Prostate cancer in 2014 s/p IMRT, with recurrence in 2021 on Lupron every 3 months with undetectable PSA.  History of rectal cancer also s/p radiation therapy.  History of HIV on Biktarvy.    Surgical history of sigmoid colostomy (March 2022).      Review of Systems:  Gen:  No fevers/chills, weight loss; +fatigue, tiredness  HEENT: No blurry vision, no difficulty swallowing, no oral or nasal ulcers  CVS: No chest pain/palpitations  Resp: No SOB/wheezing  GI: No current nausea/vomiting (+nonbloody vomiting on original transfer day), +BRBPR  MSK: No joint pain, no joint stiffness  Skin: No new rashes, itchiness  Neuro: No headaches, dizziness    MEDICATIONS  (STANDING):  acetaminophen   IVPB .. 1000 milliGRAM(s) IV Intermittent every 6 hours  amLODIPine   Tablet 5 milliGRAM(s) Oral daily  atorvastatin 10 milliGRAM(s) Oral at bedtime  bictegravir 50 mG/emtricitabine 200 mG/tenofovir alafenamide 25 mG (BIKTARVY) 1 Tablet(s) Oral daily  enoxaparin Injectable 40 milliGRAM(s) SubCutaneous every 24 hours  lactated ringers. 1000 milliLiter(s) (100 mL/Hr) IV Continuous <Continuous>  melatonin 3 milliGRAM(s) Oral at bedtime  metoprolol succinate ER 50 milliGRAM(s) Oral daily  OLANZapine 5 milliGRAM(s) Oral at bedtime    MEDICATIONS  (PRN):  HYDROmorphone  Injectable 1 milliGRAM(s) IV Push every 4 hours PRN Severe Pain (7 - 10)  HYDROmorphone  Injectable 0.5 milliGRAM(s) IV Push every 4 hours PRN Moderate Pain (4 - 6)  ondansetron Injectable 4 milliGRAM(s) IV Push every 6 hours PRN Nausea and/or Vomiting      Allergies    Allergy Status Unknown    Intolerances        PERTINENT MEDICATION HISTORY:  Alprazolam 0.5 mg TID PRN  Amlodipine 5 mg QD  ASA 81 mg QD  Atorvastatin 10 mg QHS  Biktarvy [id number] QD  Hydromorphone 8 mg Q4H PRN  Metoprolol Succinate ER 50 mg QD  Olanzapine 5 mg QD  Zolpidem 10 mg QHS      SOCIAL HISTORY:  History of smoking (quit 3 years ago, around 1 ppd until then, unknown pack years)  Denies history of alcohol use  History of IVDU (heroin, last used around 15 years ago)    OCCUPATION:  TRAVEL HISTORY:    FAMILY HISTORY:  No significant FH    Vital Signs Last 24 Hrs  T(C): 37.1 (18 Aug 2022 05:01), Max: 37.3 (17 Aug 2022 21:30)  T(F): 98.8 (18 Aug 2022 05:01), Max: 99.2 (17 Aug 2022 21:30)  HR: 106 (18 Aug 2022 05:01) (100 - 113)  BP: 127/87 (18 Aug 2022 05:01) (127/87 - 149/98)  BP(mean): 105 (17 Aug 2022 12:00) (103 - 105)  RR: 18 (18 Aug 2022 05:01) (16 - 18)  SpO2: 99% (18 Aug 2022 05:01) (96% - 100%)    Parameters below as of 18 Aug 2022 05:01  Patient On (Oxygen Delivery Method): room air      Physical Exam:  General: No apparent distress, was sleeping and easily awoken  HEENT: EOMI, MMM  CVS: +S1/S2, RRR, no murmurs/rubs/gallops  Resp: CTA b/l. No crackles/wheezing  GI: Soft, NT/ND; relatively empty colostomy bag with stool, no blood/dark stools seen in bag  MSK: No noticeable joint swelling  Neuro: AAOx3  Skin: no visible rashes    LABS:                        9.8    13.15 )-----------( 301      ( 18 Aug 2022 06:05 )             29.8     08-18    137  |  99  |  10  ----------------------------<  110<H>  3.2<L>   |  25  |  0.56    Ca    8.9      18 Aug 2022 06:05  Phos  3.4     08-18  Mg     1.90     08-18    TPro  5.7<L>  /  Alb  2.6<L>  /  TBili  1.1  /  DBili  x   /  AST  13  /  ALT  11  /  AlkPhos  71  08-16    PT/INR - ( 18 Aug 2022 06:05 )   PT: 18.1 sec;   INR: 1.55 ratio         PTT - ( 18 Aug 2022 06:05 )  PTT:26.9 sec      RADIOLOGY & ADDITIONAL STUDIES:     CARL ROWLEY  [id number]    HISTORY OF PRESENT ILLNESS:  Patient is a 57 year-old male with HIV (on Biktarvy), anxiety, hx of rectal/prostate cancer s/p radiation and sigmoid colostomy in March and drainage of a rectal abscess presenting as a transfer from [place] for lower GI Bleed. Per H&P, patient reportedly received 2 units of pRBC initially at [facility], with subsequent activation of MTP and an additional 2 units of pRBC, 1 FFP, and 1 PLT. After his transfer, patient was seen by surgery and noted to be tachycardic to 130s and SBP of 100s but mentating appropriately and without significant complaints of dizziness. A large clot was removed from his rectum and rectum packed with Kerlix wrapped in Surgicel.     HOSPITAL COURSE:  Patient initially admitted to SICU for hemorrhage watch. Multi-specialty discussion held where it was determined that there was no active extravasation seen on patient's CTA to target for embolization by IR.  DVT prophylaxis held for 24 hours due to recent bleeding and patient transferred to floors from SICU after no acute events.   Per heme/onc note, patient is scheduled for abdominal peritoneal resection with Dr. Betancourt on August 24th.     PAST MEDICAL & SURGICAL HISTORY:  History of [place] 7 Prostate cancer in 2014 s/p IMRT, with recurrence in 2021 on Lupron every 3 months with undetectable PSA.  History of rectal cancer also s/p radiation therapy.  History of HIV on Biktarvy.    Surgical history of sigmoid colostomy (March 2022).      Review of Systems:  Gen:  No fevers/chills, weight loss; +fatigue, tiredness  HEENT: No blurry vision, no difficulty swallowing, no oral or nasal ulcers  CVS: No chest pain/palpitations  Resp: No SOB/wheezing  GI: No current nausea/vomiting (+nonbloody vomiting on original transfer day), +BRBPR  MSK: No joint pain, no joint stiffness  Skin: No new rashes, itchiness  Neuro: No headaches, dizziness    MEDICATIONS  (STANDING):  acetaminophen   IVPB .. 1000 milliGRAM(s) IV Intermittent every 6 hours  amLODIPine   Tablet 5 milliGRAM(s) Oral daily  atorvastatin 10 milliGRAM(s) Oral at bedtime  bictegravir 50 mG/emtricitabine 200 mG/tenofovir alafenamide 25 mG (BIKTARVY) 1 Tablet(s) Oral daily  enoxaparin Injectable 40 milliGRAM(s) SubCutaneous every 24 hours  lactated ringers. 1000 milliLiter(s) (100 mL/Hr) IV Continuous <Continuous>  melatonin 3 milliGRAM(s) Oral at bedtime  metoprolol succinate ER 50 milliGRAM(s) Oral daily  OLANZapine 5 milliGRAM(s) Oral at bedtime    MEDICATIONS  (PRN):  HYDROmorphone  Injectable 1 milliGRAM(s) IV Push every 4 hours PRN Severe Pain (7 - 10)  HYDROmorphone  Injectable 0.5 milliGRAM(s) IV Push every 4 hours PRN Moderate Pain (4 - 6)  ondansetron Injectable 4 milliGRAM(s) IV Push every 6 hours PRN Nausea and/or Vomiting      Allergies    Allergy Status Unknown    Intolerances        PERTINENT MEDICATION HISTORY:  Alprazolam 0.5 mg TID PRN  Amlodipine 5 mg QD  ASA 81 mg QD  Atorvastatin 10 mg QHS  Biktarvy [id number] QD  Hydromorphone 8 mg Q4H PRN  Metoprolol Succinate ER 50 mg QD  Olanzapine 5 mg QD  Zolpidem 10 mg QHS      SOCIAL HISTORY:  History of smoking (quit 3 years ago, around 1 ppd until then, unknown pack years)  Denies history of alcohol use  History of IVDU (heroin, last used around 15 years ago)    OCCUPATION:  TRAVEL HISTORY:    FAMILY HISTORY:  No significant FH    Vital Signs Last 24 Hrs  T(C): 37.1 (18 Aug 2022 05:01), Max: 37.3 (17 Aug 2022 21:30)  T(F): 98.8 (18 Aug 2022 05:01), Max: 99.2 (17 Aug 2022 21:30)  HR: 106 (18 Aug 2022 05:01) (100 - 113)  BP: 127/87 (18 Aug 2022 05:01) (127/87 - 149/98)  BP(mean): 105 (17 Aug 2022 12:00) (103 - 105)  RR: 18 (18 Aug 2022 05:01) (16 - 18)  SpO2: 99% (18 Aug 2022 05:01) (96% - 100%)    Parameters below as of 18 Aug 2022 05:01  Patient On (Oxygen Delivery Method): room air      Physical Exam:  General: No apparent distress, was sleeping and easily awoken  HEENT: EOMI, MMM  CVS: +S1/S2, RRR, no murmurs/rubs/gallops  Resp: CTA b/l. No crackles/wheezing  GI: Soft, NT/ND; relatively empty colostomy bag with stool, no blood/dark stools seen in bag  MSK: No noticeable joint swelling  Neuro: AAOx3  Skin: no visible rashes    LABS:                        9.8    13.15 )-----------( 301      ( 18 Aug 2022 06:05 )             29.8     08-18    137  |  99  |  10  ----------------------------<  110<H>  3.2<L>   |  25  |  0.56    Ca    8.9      18 Aug 2022 06:05  Phos  3.4     08-18  Mg     1.90     08-18    TPro  5.7<L>  /  Alb  2.6<L>  /  TBili  1.1  /  DBili  x   /  AST  13  /  ALT  11  /  AlkPhos  71  08-16    PT/INR - ( 18 Aug 2022 06:05 )   PT: 18.1 sec;   INR: 1.55 ratio         PTT - ( 18 Aug 2022 06:05 )  PTT:26.9 sec      RADIOLOGY & ADDITIONAL STUDIES:

## 2022-08-18 NOTE — CONSULT NOTE ADULT - ATTENDING COMMENTS
I agree with the detailed interval history, physical, and plan, which I have reviewed and edited where appropriate'; also agree with notes/assessment with my team on service.  I have personally examined the patient.  I was physically present for the key portions of the evaluation and management (E/M) service provided.  I reviewed all the pertinent data.  The patient is a critical care patient with life threatening hemodynamic and metabolic instability in SICU.  The SICU team has a constant risk benefit analyzes discussion and coordinating care with the primary team and all consultants.   The patient is in SICU with the chief complaint and diagnosis mentioned in the note.   The plan will be specified in the note.  57y Male with Hx rectal cancer s/p radiation with active lower GI bleed. SICU consulted for hemodynamic monitoring.   EXAM  NEURO: A/O x4  RESPIRATORY: clear  CARDIO: RRR,  ABDOMEN: soft, appropriately tender, ostomy bag with clotted blood,   EXTREMITIES: normal   NEUROLOGIC; Pain control:  -  Dilaudid PRN  RESPIRATORY   - Monitor SpO2 goal >92%  CARDIOVASCULAR   - MAP <65  GASTROINTESTINAL   - Diet: NPO except meds  /RENAL   - IV fluids: LR @ 125cc/hr  HEMATOLOGIC; acute blood loss anemia  - Holding DVT prophylaxis   INFECTIOUS DISEASE  - Tylenol ATC  ENDOCRINE  - Monitor gluc  DISPO: SICU
57 y.o male with HIV (on Biktarvy), hx of rectal/prostate cancer s/p radiation and sigmoid colostomy in March presenting as a transfer from [place] for rectal bleeding, s/p multiple blood products, now bleeding resolved and Hgb is stable. Planned for abdominoperineal resection this hospitalization.  I reviewed recent EKG and TTE. Seen by cardiology OP recently for cardiac clearance. He denies any chest pain, shortness of breath with METS>4 and RCRI score of 1 (6.0% 30day risk of MACE). Per NSQIP calculator, he remained average/above average risk for other complications (sepsis, pneumonia, ileus) due to his systemic illness and type of surgery.   -Patient does not need further cardiac workup and can proceed with planned surgical intervention.  -He is medically optimized from chronic medical conditions. Recommend continuing amlodipine and metoprolol perioperatively.   -Post operative incentive spirometry, dvt ppx per primary team.   -Rest of medical conditions per resident note.

## 2022-08-18 NOTE — CONSULT NOTE ADULT - PROBLEM SELECTOR RECOMMENDATION 6
History of anxiety for which he takes History of anxiety and insomnia for which he takes alprazolam  - cont Alprazolam 1 mg TID PRN  - cont Zyprexa 5 mg QHS if QTc<500  - cont Zolpidem 10mg QHS PRN insomnia

## 2022-08-18 NOTE — CONSULT NOTE ADULT - PROBLEM SELECTOR RECOMMENDATION 3
Patient currently receiving Lupron Q3 months with undetectable PSA since 2021  - continued f/u with heme/onc outpatient (Dr. Mathews)

## 2022-08-18 NOTE — CONSULT NOTE ADULT - PROBLEM SELECTOR RECOMMENDATION 2
Patient with history of anal/rectal cancer s/p radiation and sigmoid colostomy. Patient with history of anal/rectal cancer s/p tumor resection and subsequent chemotherapy and radiation therapy, with continuing pain afterwards d/t draining abscess now s/p abscess drainage and diverting loop ileostomy in March  - see above for medical clearance for abdominal peritoneal resection and permanent colostomy  - Dilaudid PRN   - f/u heme/onc Patient with history of anal/rectal cancer s/p tumor resection and subsequent chemotherapy and radiation therapy, with continuing pain afterwards d/t draining abscess now s/p abscess drainage and diverting loop ileostomy in March  - see above for medical clearance for abdominal peritoneal resection and permanent colostomy  - can continue with dilaudid 8 mg oral tablet Q4H PRN for longer pain control, IV 2 mg for breakthrough pain  - f/u heme/onc

## 2022-08-19 ENCOUNTER — TRANSCRIPTION ENCOUNTER (OUTPATIENT)
Age: 57
End: 2022-08-19

## 2022-08-19 LAB
ANION GAP SERPL CALC-SCNC: 12 MMOL/L — SIGNIFICANT CHANGE UP (ref 7–14)
BUN SERPL-MCNC: 10 MG/DL — SIGNIFICANT CHANGE UP (ref 7–23)
CALCIUM SERPL-MCNC: 8.9 MG/DL — SIGNIFICANT CHANGE UP (ref 8.4–10.5)
CHLORIDE SERPL-SCNC: 99 MMOL/L — SIGNIFICANT CHANGE UP (ref 98–107)
CO2 SERPL-SCNC: 25 MMOL/L — SIGNIFICANT CHANGE UP (ref 22–31)
CREAT SERPL-MCNC: 0.56 MG/DL — SIGNIFICANT CHANGE UP (ref 0.5–1.3)
EGFR: 116 ML/MIN/1.73M2 — SIGNIFICANT CHANGE UP
GLUCOSE SERPL-MCNC: 113 MG/DL — HIGH (ref 70–99)
HCT VFR BLD CALC: 31.8 % — LOW (ref 39–50)
HGB BLD-MCNC: 10.8 G/DL — LOW (ref 13–17)
MAGNESIUM SERPL-MCNC: 2 MG/DL — SIGNIFICANT CHANGE UP (ref 1.6–2.6)
MCHC RBC-ENTMCNC: 28.6 PG — SIGNIFICANT CHANGE UP (ref 27–34)
MCHC RBC-ENTMCNC: 34 GM/DL — SIGNIFICANT CHANGE UP (ref 32–36)
MCV RBC AUTO: 84.4 FL — SIGNIFICANT CHANGE UP (ref 80–100)
NRBC # BLD: 0 /100 WBCS — SIGNIFICANT CHANGE UP (ref 0–0)
NRBC # FLD: 0 K/UL — SIGNIFICANT CHANGE UP (ref 0–0)
PHOSPHATE SERPL-MCNC: 3 MG/DL — SIGNIFICANT CHANGE UP (ref 2.5–4.5)
PLATELET # BLD AUTO: 349 K/UL — SIGNIFICANT CHANGE UP (ref 150–400)
POTASSIUM SERPL-MCNC: 3.2 MMOL/L — LOW (ref 3.5–5.3)
POTASSIUM SERPL-SCNC: 3.2 MMOL/L — LOW (ref 3.5–5.3)
RBC # BLD: 3.77 M/UL — LOW (ref 4.2–5.8)
RBC # FLD: 14.9 % — HIGH (ref 10.3–14.5)
SODIUM SERPL-SCNC: 136 MMOL/L — SIGNIFICANT CHANGE UP (ref 135–145)
WBC # BLD: 8.75 K/UL — SIGNIFICANT CHANGE UP (ref 3.8–10.5)
WBC # FLD AUTO: 8.75 K/UL — SIGNIFICANT CHANGE UP (ref 3.8–10.5)

## 2022-08-19 RX ORDER — ACETAMINOPHEN 500 MG
975 TABLET ORAL EVERY 6 HOURS
Refills: 0 | Status: DISCONTINUED | OUTPATIENT
Start: 2022-08-19 | End: 2022-08-20

## 2022-08-19 RX ORDER — ASPIRIN/CALCIUM CARB/MAGNESIUM 324 MG
81 TABLET ORAL DAILY
Refills: 0 | Status: DISCONTINUED | OUTPATIENT
Start: 2022-08-19 | End: 2022-08-20

## 2022-08-19 RX ORDER — POTASSIUM CHLORIDE 20 MEQ
40 PACKET (EA) ORAL EVERY 4 HOURS
Refills: 0 | Status: COMPLETED | OUTPATIENT
Start: 2022-08-19 | End: 2022-08-19

## 2022-08-19 RX ORDER — OXYCODONE HYDROCHLORIDE 5 MG/1
2.5 TABLET ORAL EVERY 6 HOURS
Refills: 0 | Status: DISCONTINUED | OUTPATIENT
Start: 2022-08-19 | End: 2022-08-20

## 2022-08-19 RX ADMIN — AMLODIPINE BESYLATE 5 MILLIGRAM(S): 2.5 TABLET ORAL at 13:21

## 2022-08-19 RX ADMIN — Medication 3 MILLIGRAM(S): at 21:27

## 2022-08-19 RX ADMIN — Medication 81 MILLIGRAM(S): at 12:13

## 2022-08-19 RX ADMIN — ENOXAPARIN SODIUM 40 MILLIGRAM(S): 100 INJECTION SUBCUTANEOUS at 12:11

## 2022-08-19 RX ADMIN — Medication 975 MILLIGRAM(S): at 06:15

## 2022-08-19 RX ADMIN — Medication 40 MILLIEQUIVALENT(S): at 13:21

## 2022-08-19 RX ADMIN — ATORVASTATIN CALCIUM 10 MILLIGRAM(S): 80 TABLET, FILM COATED ORAL at 21:28

## 2022-08-19 RX ADMIN — Medication 975 MILLIGRAM(S): at 06:45

## 2022-08-19 RX ADMIN — BICTEGRAVIR SODIUM, EMTRICITABINE, AND TENOFOVIR ALAFENAMIDE FUMARATE 1 TABLET(S): 30; 120; 15 TABLET ORAL at 12:11

## 2022-08-19 RX ADMIN — OLANZAPINE 5 MILLIGRAM(S): 15 TABLET, FILM COATED ORAL at 21:28

## 2022-08-19 RX ADMIN — Medication 40 MILLIEQUIVALENT(S): at 09:16

## 2022-08-19 RX ADMIN — Medication 50 MILLIGRAM(S): at 05:49

## 2022-08-19 NOTE — PROGRESS NOTE ADULT - ASSESSMENT
57 year old male with Hx rectal/prostate cancer s/p radiation, hx of sigmoid colostomy in March with Dr. Avilez, now presents with lower GI bleed that began at 9pm 8/15. patient was admitted to SICU for hemodynamic monitoring s/p- S/p 4pRBC, 1 FFP, 1 plt. Patient stable and transferred to floor on 8/18    Plan:  - OR planning for next week LAR, cleared by medicine   - Monitor packing in rectum (kerlix and surgicel) and change daily  - IVF: LR 100ml/hr  - Pain Control with Tylenol, PRN dilaudid  - Nausea control PRN   - Diet: Reg  - DVT Prophylaxis: Lovenox  - OOB and ambulate  - Dispo:     A Team Surgery  [id number]    57 year old male with Hx rectal/prostate cancer s/p radiation, hx of sigmoid colostomy in March with Dr. Avilez, now presents with lower GI bleed that began at 9pm 8/15. patient was admitted to SICU for hemodynamic monitoring s/p- S/p 4pRBC, 1 FFP, 1 plt. Patient stable and transferred to floor on 8/18    PLAN:  - OR planning for next week LAR, cleared by medicine   - Remove rectal packing today and monitor for bleeding  - Pain Control with Tylenol, PRN oxycodone  - OOB/ Ambulate  - Diet: Reg  - DVT Prophylaxis: Lovenox    A Team Surgery  [id number]    57 year old male with Hx rectal/prostate cancer s/p radiation, hx of sigmoid colostomy in March with Dr. Avilez, now presents with lower GI bleed that began at 9pm 8/15. patient was admitted to SICU for hemodynamic monitoring s/p- S/p 4pRBC, 1 FFP, 1 plt. Patient stable and transferred to floor on 8/18    PLAN:  - OR planning for next week APR, cleared by medicine   - Remove rectal packing today and monitor for bleeding  - Pain Control with Tylenol, PRN oxycodone  - OOB/ Ambulate  - Diet: Reg  - DVT Prophylaxis: Lovenox    A Team Surgery  [id number]

## 2022-08-19 NOTE — PROGRESS NOTE ADULT - SUBJECTIVE AND OBJECTIVE BOX
Patient is a 57y old  Male who presents with a chief complaint of Rectal Bleeding (19 Aug 2022 09:37)      MEDICATIONS  (STANDING):  acetaminophen     Tablet .. 975 milliGRAM(s) Oral every 6 hours  amLODIPine   Tablet 5 milliGRAM(s) Oral daily  aspirin enteric coated 81 milliGRAM(s) Oral daily  atorvastatin 10 milliGRAM(s) Oral at bedtime  bictegravir 50 mG/emtricitabine 200 mG/tenofovir alafenamide 25 mG (BIKTARVY) 1 Tablet(s) Oral daily  enoxaparin Injectable 40 milliGRAM(s) SubCutaneous every 24 hours  melatonin 3 milliGRAM(s) Oral at bedtime  metoprolol succinate ER 50 milliGRAM(s) Oral daily  OLANZapine 5 milliGRAM(s) Oral at bedtime    MEDICATIONS  (PRN):  oxyCODONE    IR 2.5 milliGRAM(s) Oral every 6 hours PRN Moderate Pain (4 - 6)      ROS  No fever, sweats, chills  No epistaxis, HA, sore throat  No CP, SOB, cough, sputum  No n/v/d, abd pain, melena, hematochezia  No edema  No rash  No anxiety  No back pain, joint pain  No bleeding, bruising  No dysuria, hematuria    Vital Signs Last 24 Hrs  T(C): 36.7 (19 Aug 2022 13:29), Max: 37.2 (18 Aug 2022 17:14)  T(F): 98 (19 Aug 2022 13:29), Max: 98.9 (18 Aug 2022 17:14)  HR: 97 (19 Aug 2022 13:29) (91 - 99)  BP: 134/97 (19 Aug 2022 13:29) (128/97 - 137/106)  BP(mean): --  RR: 18 (19 Aug 2022 13:29) (17 - 18)  SpO2: 99% (19 Aug 2022 13:29) (98% - 100%)    Parameters below as of 19 Aug 2022 13:29  Patient On (Oxygen Delivery Method): room air        PE  NAD  Awake, alert  Anicteric, MMM  RRR  CTAB  Abd soft, NT, ND  No c/c/e  No rash grossly  FROM                          10.8   8.75  )-----------( 349      ( 19 Aug 2022 06:15 )             31.8       08-19    136  |  99  |  10  ----------------------------<  113<H>  3.2<L>   |  25  |  0.56    Ca    8.9      19 Aug 2022 06:15  Phos  3.0     08-19  Mg     2.00     08-19

## 2022-08-19 NOTE — PROGRESS NOTE ADULT - ASSESSMENT
57 year old male   with prostate cancer:  Hematology/Oncology called to see patient who is under care of Dr. Manuel Mathews of [facility] for the management of prostate cancer. Patient had a history of [place] 7 prostate cancer in 04/2014 s/p IMRT, with recurrence in 01/2021. He has been receiving Lupron every 3 months and his PSA has been undetectable since 10/2021. Patient seen this afternoon, reports feeling fine aside from rectal bleeding.     with anorectal squamous cell cancer diagnosed in 2021 treated with RT and mitomycin/xeloda    1. Prostate cancer with history of recurrence   - Receives Lupron q3 months, last dose 07/13/22  - PSA undetectable since 10/2021   - Continued management with Dr. Mathews after discharge     2. Anemia, lower GI bleed, h/o sigmoid colostomy    hx of radiation proctitis? from tx of anal cancer  f/u CRS recommendations        Alphonse De Leon  Hematology/Oncology  [facility]  [phone] (office)

## 2022-08-19 NOTE — DISCHARGE NOTE PROVIDER - NSCORESITESY/N_GEN_A_CORE_RD
Encounter Date: 1/14/2019       History     Chief Complaint   Patient presents with    Allergies     pt's mother reports pt has bilateral eye swelling & itching every morning for 1 week; pt has been recieving Loratadine every morning which get rid of the symptoms throughout the day; pt currently denies any problems & no swelling noted; pt's mother reports that pt needs to see allergy specialist     Chief complaint:  Itchy watery eyes    History of present illness:  Patient is a 10-year-old female who is presented by her mother for 1 week of bilateral eye itching and crusting in the mornings.  Reports this is intermittent and resolved throughout the day.  She is currently using loratadine 10 mg p.o. q.d. for this problem.  Denies fever, chills, runny nose congestion, cough, ear pain, rash. Reports associated sneezing.          Review of patient's allergies indicates:  No Known Allergies  No past medical history on file.  No past surgical history on file.  Family History   Problem Relation Age of Onset    No Known Problems Mother     No Known Problems Father     Hypertension Maternal Grandmother      Social History     Tobacco Use    Smoking status: Never Smoker    Tobacco comment: Pt is not a passive smoker.   Substance Use Topics    Alcohol use: Not on file    Drug use: Not on file     Review of Systems   Constitutional: Negative for appetite change, chills and fever.   HENT: Negative for congestion, ear discharge, ear pain, nosebleeds, postnasal drip, rhinorrhea, sinus pressure, sneezing, sore throat and voice change.    Eyes: Positive for discharge and itching. Negative for pain, redness and visual disturbance.   Respiratory: Negative for cough, shortness of breath and wheezing.    Cardiovascular: Negative for chest pain, palpitations and leg swelling.   Gastrointestinal: Negative for abdominal pain, constipation, diarrhea, nausea and vomiting.   Endocrine: Negative for polydipsia, polyphagia and polyuria.    Genitourinary: Negative for dysuria, frequency, hematuria, urgency, vaginal bleeding, vaginal discharge and vaginal pain.   Musculoskeletal: Negative for arthralgias and myalgias.   Skin: Negative for rash and wound.   Neurological: Negative for dizziness, weakness and headaches.   Hematological: Negative for adenopathy. Does not bruise/bleed easily.       Physical Exam     Initial Vitals [01/14/19 2257]   BP Pulse Resp Temp SpO2   (!) 127/66 83 22 98.5 °F (36.9 °C) 98 %      MAP       --         Physical Exam    Nursing note and vitals reviewed.  Constitutional: Vital signs are normal. She appears well-developed and well-nourished.   HENT:   Head: Normocephalic and atraumatic. No signs of injury.   Right Ear: Tympanic membrane and external ear normal.   Left Ear: Tympanic membrane and external ear normal.   Nose: Nose normal. No nasal discharge.   Mouth/Throat: Mucous membranes are moist. Dentition is normal. No dental caries. No tonsillar exudate. Oropharynx is clear. Pharynx is normal.   Eyes: Conjunctivae, EOM and lids are normal. Pupils are equal, round, and reactive to light. Right eye exhibits no discharge. Left eye exhibits no discharge.   Visual acuity:  Left equals 20/25; right 20/25; both 20/20   Neck: Full passive range of motion without pain. Neck supple. No neck rigidity.   Cardiovascular: Normal rate, regular rhythm, S1 normal and S2 normal.   No murmur heard.  Pulmonary/Chest: Effort normal and breath sounds normal. No stridor. No respiratory distress. Air movement is not decreased. She has no wheezes. She has no rhonchi. She has no rales. She exhibits no retraction.   Abdominal: Soft. She exhibits no distension.   Musculoskeletal: She exhibits no edema, tenderness, deformity or signs of injury.   Lymphadenopathy: No occipital adenopathy is present.     She has no cervical adenopathy.   Neurological: She is alert.   Skin: Skin is warm and dry. Capillary refill takes less than 2 seconds.         ED  Course   Procedures  Labs Reviewed - No data to display       Imaging Results    None                APC / Resident Notes:   Initial assessment:  10-year-old female with itchy watery eyes in the a.m. only, no visual changes, no photophobia, no foreign body sensation    Differential diagnosis includes conjunctivitis, foreign body, corneal abrasion, corneal ulceration    Physical assessment reveals an atraumatic afebrile 10-year-old female with a grossly normal eye exam.  She denies photophobia or foreign body sensation.  I feel corneal abrasion or ulceration or unlikely as this foreign body.  As the patient has a new cat I believe this is most likely allergic conjunctivitis.  Mother was previously dosing the patient with 10 mg of loratadine q.day.  We have directed her to use Zyrtec 2.5 mg q.day instead.  She should also use over-the-counter Zaditor drops as directed on the package.  Return for any worsening or changes in condition.  Follow up with pediatric allergy service referral provided.    Care is provided jointly by Dr. Burnham and MAHESH.                 Clinical Impression:   The encounter diagnosis was Allergic conjunctivitis, unspecified laterality.      Disposition:   Disposition: Discharged  Condition: Stable                        Олег Bhat, [facility]  01/15/19 0059     No

## 2022-08-19 NOTE — PROGRESS NOTE ADULT - SUBJECTIVE AND OBJECTIVE BOX
A TEAM Surgery Progress Note  Patient is a 57y old  Male who presents with a chief complaint of Rectal Bleeding (19 Aug 2022 03:14)    INTERVAL EVENTS: Patient is HD#3 here with rectal bleeding, now resolved. No acute events overnight.    SUBJECTIVE: Patient seen and examined at bedside with surgical team, patient without complaints. Denies further bleeding per rectum. Tolerating regular diet. Ostomy functioning. Denies fever, chills, CP, SOB nausea, vomiting, abdominal pain.    OBJECTIVE:    Vital Signs Last 24 Hrs  T(C): 36.8 (19 Aug 2022 05:30), Max: 37.5 (18 Aug 2022 09:53)  T(F): 98.3 (19 Aug 2022 05:30), Max: 99.5 (18 Aug 2022 09:53)  HR: 91 (19 Aug 2022 05:30) (88 - 98)  BP: 130/82 (19 Aug 2022 05:30) (128/97 - 139/90)  BP(mean): --  RR: 18 (19 Aug 2022 05:30) (17 - 18)  SpO2: 100% (19 Aug 2022 05:30) (98% - 100%)    Parameters below as of 19 Aug 2022 05:30  Patient On (Oxygen Delivery Method): room air    I&O's Detail    18 Aug 2022 07:01  -  19 Aug 2022 07:00  --------------------------------------------------------  IN:    IV PiggyBack: 100 mL    Oral Fluid: 480 mL  Total IN: 580 mL    OUT:    Colostomy (mL): 10 mL    Voided (mL): 1700 mL  Total OUT: 1710 mL    Total NET: -1130 mL      MEDICATIONS  (STANDING):  acetaminophen     Tablet .. 975 milliGRAM(s) Oral every 6 hours  amLODIPine   Tablet 5 milliGRAM(s) Oral daily  aspirin enteric coated 81 milliGRAM(s) Oral daily  atorvastatin 10 milliGRAM(s) Oral at bedtime  bictegravir 50 mG/emtricitabine 200 mG/tenofovir alafenamide 25 mG (BIKTARVY) 1 Tablet(s) Oral daily  enoxaparin Injectable 40 milliGRAM(s) SubCutaneous every 24 hours  melatonin 3 milliGRAM(s) Oral at bedtime  metoprolol succinate ER 50 milliGRAM(s) Oral daily  OLANZapine 5 milliGRAM(s) Oral at bedtime  potassium chloride    Tablet ER 40 milliEquivalent(s) Oral every 4 hours    MEDICATIONS  (PRN):  oxyCODONE    IR 2.5 milliGRAM(s) Oral every 6 hours PRN Moderate Pain (4 - 6)      PHYSICAL EXAM:  Constitutional: A&Ox3, NAD  Respiratory: Unlabored breathing  Abdomen: Soft, nondistended, NTTP. No rebound or guarding. Ostomy pink with gas and stool in bag.  Extremities: WWP, VASQUEZ spontaneously    LABS:                        10.8   8.75  )-----------( 349      ( 19 Aug 2022 06:15 )             31.8     08-19    136  |  99  |  10  ----------------------------<  113<H>  3.2<L>   |  25  |  0.56    Ca    8.9      19 Aug 2022 06:15  Phos  3.0     08-19  Mg     2.00     08-19      PT/INR - ( 18 Aug 2022 06:05 )   PT: 18.1 sec;   INR: 1.55 ratio         PTT - ( 18 Aug 2022 06:05 )  PTT:26.9 sec          IMAGING:

## 2022-08-19 NOTE — DISCHARGE NOTE PROVIDER - NSDCCPCAREPLAN_GEN_ALL_CORE_FT
PRINCIPAL DISCHARGE DIAGNOSIS  Diagnosis: Rectal bleeding  Assessment and Plan of Treatment: ************  DIET: Return to your usual diet.  NOTIFY YOUR SURGEON IF YOU HAVE: any bleeding that does not stop, any pus draining from your wound(s), any fever (over 100.4 F) persistent nausea/vomiting, or if your pain is not controlled on your discharge pain medications, unable to urinate.  FOLLOW UP:  1. Please follow up with your primary care physician in one week regarding your hospitalization, bring copies of your discharge paperwork.  2. Please follow up with your surgeon, Dr. Betancourt. Call [phone] to make an appointment.       PRINCIPAL DISCHARGE DIAGNOSIS  Diagnosis: Rectal bleeding  Assessment and Plan of Treatment: Patient is currently hemostatic without need for packing. Labs have remained stable and he is hemodynamically stable.  DIET: Return to your usual diet.  NOTIFY YOUR SURGEON IF YOU HAVE: any bleeding that does not stop, any pus draining from your wound(s), any fever (over 100.4 F) persistent nausea/vomiting, or if your pain is not controlled on your discharge pain medications, unable to urinate.  FOLLOW UP:  1. Please follow up with your primary care physician in one week regarding your hospitalization, bring copies of your discharge paperwork.  2. Please follow up with your surgeon, Dr. Betancourt. Call [phone] to make an appointment.

## 2022-08-19 NOTE — DISCHARGE NOTE PROVIDER - NSDCMRMEDTOKEN_GEN_ALL_CORE_FT
ALPRAZolam 0.5 mg oral tablet: 1 tab(s) orally 3 times a day, As Needed  amLODIPine 5 mg oral tablet: 1 tab(s) orally once a day  aspirin 81 mg oral tablet:   atorvastatin 10 mg oral tablet: 1 tab(s) orally once a day  Biktarvy 30 mg-120 mg-15 mg oral tablet: 1 tab(s) orally once a day  HYDROmorphone 8 mg oral tablet: 1 tab(s) orally every 4 hours, As Needed  metoprolol succinate 50 mg oral tablet, extended release: 1 tab(s) orally once a day  OLANZapine 5 mg oral tablet: 1 tab(s) orally once a day  zolpidem 10 mg oral tablet: 1 tab(s) orally once a day (at bedtime)   acetaminophen 325 mg oral tablet: 3 tab(s) orally every 6 hours MDD:4 grams as needed for moderate  ALPRAZolam 0.5 mg oral tablet: 1 tab(s) orally 3 times a day, As Needed  amLODIPine 5 mg oral tablet: 1 tab(s) orally once a day  aspirin 81 mg oral tablet: orally once a day  atorvastatin 10 mg oral tablet: 1 tab(s) orally once a day  Biktarvy 30 mg-120 mg-15 mg oral tablet: 1 tab(s) orally once a day  HYDROmorphone 8 mg oral tablet: 1 tab(s) orally every 4 hours, As Needed  metoprolol succinate 50 mg oral tablet, extended release: 1 tab(s) orally once a day  OLANZapine 5 mg oral tablet: 1 tab(s) orally once a day  zolpidem 10 mg oral tablet: 1 tab(s) orally once a day (at bedtime)

## 2022-08-19 NOTE — DISCHARGE NOTE PROVIDER - HOSPITAL COURSE
This patient is a 57 year old male with Hx rectal/prostate cancer s/p radiation, hx of sigmoid colostomy in March with Dr. Avilez, scheduled for abdominal peritoneal resection with Dr. Betancourt on August 24th.  Presented to [facility] ED on 8/16/22 with lower GI bleed that began at 9pm 8/15. Patient was transferred from [facility], s/p 2 unit PRBC. MTP initiated and patient received additional 2 PRBC, 1 FFP, and 1 plt. In [facility] ED, patient tachycardic to 130s, SBP 100s. Mentating appropriately, denies dizziness. Large clot from rectum removed, and rectum packed with kerlix wrapped in surgicel. Admitted to colorectal surgery service and transferred to SICU for hemorrhage watch. Hospital day#1-2 patient remained hemodynamically stable without further rectal bleeding. Patient deemed stable from transfer from ICU to floors HD#2. Rectal packing changed and replaced HD#2, no blood per rectum. Hospital day#3 diet advanced to regular which he tolerated welll. Now patient is ambulating, voiding, tolerating a regular diet.     *****INCOMPLETE     Patient has been deemed stable for discharge home with follow up as an outpatient.    This patient is a 57 year old male with Hx rectal/prostate cancer s/p radiation, hx of sigmoid colostomy in March with Dr. Avilez, scheduled for abdominal peritoneal resection with Dr. Betancourt on August 24th.  Presented to [facility] ED on 8/16/22 with lower GI bleed that began at 9pm 8/15. Patient was transferred from [facility], s/p 2 unit PRBC. MTP initiated and patient received additional 2 PRBC, 1 FFP, and 1 plt. In [facility] ED, patient tachycardic to 130s, SBP 100s. Mentating appropriately, denies dizziness. Large clot from rectum removed, and rectum packed with kerlix wrapped in surgicel. Admitted to colorectal surgery service and transferred to SICU for hemorrhage watch. Hospital day#1-2 patient remained hemodynamically stable without further rectal bleeding. Patient deemed stable from transfer from ICU to floors HD#2. Rectal packing changed and replaced HD#2, no blood per rectum. Hospital day#3 diet advanced to regular which he tolerated welll. On the day of discharge patient is ambulating, voiding, tolerating a regular diet and has been hemostatic.      Patient has been deemed stable for discharge home with follow up as an outpatient.     `ICU Vital Signs Last 24 Hrs  T(C): 36.8 (20 Aug 2022 13:24), Max: 37 (20 Aug 2022 06:00)  T(F): 98.2 (20 Aug 2022 13:24), Max: 98.6 (20 Aug 2022 06:00)  HR: 100 (20 Aug 2022 13:24) (90 - 100)  BP: 116/93 (20 Aug 2022 13:24) (116/93 - 154/101)  BP(mean): --  ABP: --  ABP(mean): --  RR: 18 (20 Aug 2022 13:24) (18 - 19)  SpO2: 100% (20 Aug 2022 13:24) (96% - 100%)    O2 Parameters below as of 20 Aug 2022 13:24  Patient On (Oxygen Delivery Method): room air

## 2022-08-19 NOTE — DISCHARGE NOTE PROVIDER - NSDCCAREPROVSEEN_GEN_ALL_CORE_FT
ID Focus Note    The antibiotic plan for discharge will be as follows:    Cefepime 2 grams IV Q 24 hours  Vancomycin 750 mg IV Q 48 hours with next dose due on 8/31    CBC, creat, vancomycin trough Q Mon and Thurs  Goal trough for the vancomycin will be between 10-15   Fax the results to Dr Spangler at [phone]    Mell Farias PA-C     Angel Betancourt

## 2022-08-19 NOTE — DISCHARGE NOTE PROVIDER - CARE PROVIDER_API CALL
Angel Betancourt)  ColonRectal Surgery; Surgery  [facility], [address] [place] [address]  Phone: [phone]  Fax: [phone]  Follow Up Time: 1 week

## 2022-08-20 ENCOUNTER — TRANSCRIPTION ENCOUNTER (OUTPATIENT)
Age: 57
End: 2022-08-20

## 2022-08-20 VITALS
SYSTOLIC BLOOD PRESSURE: 116 MMHG | HEART RATE: 100 BPM | TEMPERATURE: 98 F | RESPIRATION RATE: 18 BRPM | DIASTOLIC BLOOD PRESSURE: 93 MMHG | OXYGEN SATURATION: 100 %

## 2022-08-20 LAB
ANION GAP SERPL CALC-SCNC: 10 MMOL/L — SIGNIFICANT CHANGE UP (ref 7–14)
BUN SERPL-MCNC: 10 MG/DL — SIGNIFICANT CHANGE UP (ref 7–23)
CALCIUM SERPL-MCNC: 9 MG/DL — SIGNIFICANT CHANGE UP (ref 8.4–10.5)
CHLORIDE SERPL-SCNC: 100 MMOL/L — SIGNIFICANT CHANGE UP (ref 98–107)
CO2 SERPL-SCNC: 26 MMOL/L — SIGNIFICANT CHANGE UP (ref 22–31)
CREAT SERPL-MCNC: 0.57 MG/DL — SIGNIFICANT CHANGE UP (ref 0.5–1.3)
EGFR: 115 ML/MIN/1.73M2 — SIGNIFICANT CHANGE UP
GLUCOSE SERPL-MCNC: 104 MG/DL — HIGH (ref 70–99)
HCT VFR BLD CALC: 33.2 % — LOW (ref 39–50)
HGB BLD-MCNC: 11.1 G/DL — LOW (ref 13–17)
MAGNESIUM SERPL-MCNC: 2 MG/DL — SIGNIFICANT CHANGE UP (ref 1.6–2.6)
MCHC RBC-ENTMCNC: 28.4 PG — SIGNIFICANT CHANGE UP (ref 27–34)
MCHC RBC-ENTMCNC: 33.4 GM/DL — SIGNIFICANT CHANGE UP (ref 32–36)
MCV RBC AUTO: 84.9 FL — SIGNIFICANT CHANGE UP (ref 80–100)
NRBC # BLD: 0 /100 WBCS — SIGNIFICANT CHANGE UP (ref 0–0)
NRBC # FLD: 0 K/UL — SIGNIFICANT CHANGE UP (ref 0–0)
PHOSPHATE SERPL-MCNC: 3.3 MG/DL — SIGNIFICANT CHANGE UP (ref 2.5–4.5)
PLATELET # BLD AUTO: 376 K/UL — SIGNIFICANT CHANGE UP (ref 150–400)
POTASSIUM SERPL-MCNC: 3.4 MMOL/L — LOW (ref 3.5–5.3)
POTASSIUM SERPL-SCNC: 3.4 MMOL/L — LOW (ref 3.5–5.3)
RBC # BLD: 3.91 M/UL — LOW (ref 4.2–5.8)
RBC # FLD: 15.1 % — HIGH (ref 10.3–14.5)
SODIUM SERPL-SCNC: 136 MMOL/L — SIGNIFICANT CHANGE UP (ref 135–145)
WBC # BLD: 6.87 K/UL — SIGNIFICANT CHANGE UP (ref 3.8–10.5)
WBC # FLD AUTO: 6.87 K/UL — SIGNIFICANT CHANGE UP (ref 3.8–10.5)

## 2022-08-20 RX ORDER — ACETAMINOPHEN 500 MG
3 TABLET ORAL
Qty: 84 | Refills: 0
Start: 2022-08-20 | End: 2022-08-26

## 2022-08-20 RX ORDER — POTASSIUM CHLORIDE 20 MEQ
20 PACKET (EA) ORAL EVERY 4 HOURS
Refills: 0 | Status: COMPLETED | OUTPATIENT
Start: 2022-08-20 | End: 2022-08-20

## 2022-08-20 RX ADMIN — Medication 20 MILLIEQUIVALENT(S): at 13:07

## 2022-08-20 RX ADMIN — BICTEGRAVIR SODIUM, EMTRICITABINE, AND TENOFOVIR ALAFENAMIDE FUMARATE 1 TABLET(S): 30; 120; 15 TABLET ORAL at 12:39

## 2022-08-20 RX ADMIN — Medication 20 MILLIEQUIVALENT(S): at 17:21

## 2022-08-20 RX ADMIN — ENOXAPARIN SODIUM 40 MILLIGRAM(S): 100 INJECTION SUBCUTANEOUS at 11:27

## 2022-08-20 RX ADMIN — Medication 50 MILLIGRAM(S): at 05:45

## 2022-08-20 RX ADMIN — Medication 975 MILLIGRAM(S): at 06:14

## 2022-08-20 RX ADMIN — OXYCODONE HYDROCHLORIDE 2.5 MILLIGRAM(S): 5 TABLET ORAL at 03:12

## 2022-08-20 RX ADMIN — AMLODIPINE BESYLATE 5 MILLIGRAM(S): 2.5 TABLET ORAL at 13:07

## 2022-08-20 RX ADMIN — Medication 975 MILLIGRAM(S): at 05:44

## 2022-08-20 RX ADMIN — Medication 975 MILLIGRAM(S): at 00:41

## 2022-08-20 RX ADMIN — Medication 975 MILLIGRAM(S): at 00:11

## 2022-08-20 RX ADMIN — Medication 81 MILLIGRAM(S): at 11:27

## 2022-08-20 RX ADMIN — OXYCODONE HYDROCHLORIDE 2.5 MILLIGRAM(S): 5 TABLET ORAL at 03:42

## 2022-08-20 NOTE — PROGRESS NOTE ADULT - REASON FOR ADMISSION
Rectal Bleeding

## 2022-08-20 NOTE — DISCHARGE NOTE NURSING/CASE MANAGEMENT/SOCIAL WORK - NSDCPEFALRISK_GEN_ALL_CORE
For information on Fall & Injury Prevention, visit: [email or web address] OR  [email or web address] OR  https://www.cdc.gov/steadi/patient.html

## 2022-08-20 NOTE — PROGRESS NOTE ADULT - SUBJECTIVE AND OBJECTIVE BOX
Overnight events:  -None GENERAL SURGERY PROGRESS NOTE      SUBJECTIVE  Patient was resting comfortably in bed. Denies pain. ostomy is +/+. Denies bleeding from below    OVERNIGHT EVENTS:  NAEON.    10-point review of systems completed and negative except as noted above.      OBJECTIVE    MEDICATIONS  acetaminophen     Tablet .. 975 milliGRAM(s) Oral every 6 hours  amLODIPine   Tablet 5 milliGRAM(s) Oral daily  aspirin enteric coated 81 milliGRAM(s) Oral daily  atorvastatin 10 milliGRAM(s) Oral at bedtime  bictegravir 50 mG/emtricitabine 200 mG/tenofovir alafenamide 25 mG (BIKTARVY) 1 Tablet(s) Oral daily  enoxaparin Injectable 40 milliGRAM(s) SubCutaneous every 24 hours  melatonin 3 milliGRAM(s) Oral at bedtime  metoprolol succinate ER 50 milliGRAM(s) Oral daily  OLANZapine 5 milliGRAM(s) Oral at bedtime  oxyCODONE    IR 2.5 milliGRAM(s) Oral every 6 hours PRN      PHYSICAL EXAM  T(C): 37 (08-20-22 @ 06:00), Max: 37 (08-20-22 @ 06:00)  HR: 100 (08-20-22 @ 06:00) (90 - 100)  BP: 126/85 (08-20-22 @ 06:00) (126/85 - 154/101)  RR: 19 (08-20-22 @ 06:00) (18 - 19)  SpO2: 98% (08-20-22 @ 06:00) (96% - 100%)    08-19-22 @ 07:01  -  08-20-22 @ 07:00  --------------------------------------------------------  IN: 500 mL / OUT: 1510 mL / NET: -1010 mL        General: Appears well, NAD  Neuro: AAOx3  CHEST: Clear to auscultation bilaterally  CV: Regular rate and rhythm  Abdomen: soft, nontender, nondistended, no rebound or guarding. Ostomy Bag in place with gas and stool.   Rectum: Hemostatic.   Extremities: Grossly symmetric    LABS                        11.1   6.87  )-----------( 376      ( 20 Aug 2022 06:00 )             33.2     08-20    136  |  100  |  10  ----------------------------<  104<H>  3.4<L>   |  26  |  0.57    Ca    9.0      20 Aug 2022 06:00  Phos  3.3     08-20  Mg     2.00     08-20            RADIOLOGY & ADDITIONAL STUDIES

## 2022-08-20 NOTE — PROGRESS NOTE ADULT - ASSESSMENT
57 year old male with Hx rectal/prostate cancer s/p radiation, hx of sigmoid colostomy in March with Dr. Avilez, now presents with lower GI bleed that began at 9pm 8/15. patient was admitted to SICU for hemodynamic monitoring s/p- S/p 4pRBC, 1 FFP, 1 plt. Patient stable and transferred to floor on 8/18    PLAN:  - OR planning for next week APR, cleared by medicine   - Remove rectal packing today and monitor for bleeding  - Pain Control with Tylenol, PRN oxycodone  - OOB/ Ambulate  - Diet: Reg  - DVT Prophylaxis: Lovenox    A Team Surgery  [id number]    57 year old male with Hx rectal/prostate cancer s/p radiation, hx of sigmoid colostomy in March with Dr. Avilez, now presents with lower GI bleed that began at 9pm 8/15. patient was admitted to SICU for hemodynamic monitoring s/p- S/p 4pRBC, 1 FFP, 1 plt. Patient stable and transferred to floor on 8/18    PLAN:  - OR planning for next week APR, cleared by medicine   - Rectum hemostatic.  - Pain Control with Tylenol, PRN oxycodone  - OOB/ Ambulate  - Diet: Reg  - DVT Prophylaxis: Lovenox    A Team Surgery  [id number]

## 2022-08-20 NOTE — DISCHARGE NOTE NURSING/CASE MANAGEMENT/SOCIAL WORK - PATIENT PORTAL LINK FT
You can access the FollowMyHealth Patient Portal offered by [facility] by registering at the following website: [email or web address]. By joining [employer]’s FollowMyHealth portal, you will also be able to view your health information using other applications (apps) compatible with our system.

## 2022-08-21 LAB
CULTURE RESULTS: SIGNIFICANT CHANGE UP
CULTURE RESULTS: SIGNIFICANT CHANGE UP
SPECIMEN SOURCE: SIGNIFICANT CHANGE UP
SPECIMEN SOURCE: SIGNIFICANT CHANGE UP

## 2022-08-23 ENCOUNTER — TRANSCRIPTION ENCOUNTER (OUTPATIENT)
Age: 57
End: 2022-08-23

## 2022-08-24 ENCOUNTER — INPATIENT (INPATIENT)
Facility: HOSPITAL | Age: 57
LOS: 8 days | Discharge: HOME | End: 2022-09-02
Attending: STUDENT IN AN ORGANIZED HEALTH CARE EDUCATION/TRAINING PROGRAM | Admitting: STUDENT IN AN ORGANIZED HEALTH CARE EDUCATION/TRAINING PROGRAM

## 2022-08-24 ENCOUNTER — TRANSCRIPTION ENCOUNTER (OUTPATIENT)
Age: 57
End: 2022-08-24

## 2022-08-24 ENCOUNTER — NON-APPOINTMENT (OUTPATIENT)
Age: 57
End: 2022-08-24

## 2022-08-24 ENCOUNTER — APPOINTMENT (OUTPATIENT)
Dept: PLASTIC SURGERY | Facility: HOSPITAL | Age: 57
End: 2022-08-24

## 2022-08-24 ENCOUNTER — APPOINTMENT (OUTPATIENT)
Dept: COLORECTAL SURGERY | Facility: HOSPITAL | Age: 57
End: 2022-08-24

## 2022-08-24 ENCOUNTER — RESULT REVIEW (OUTPATIENT)
Age: 57
End: 2022-08-24

## 2022-08-24 VITALS
WEIGHT: 186.07 LBS | HEART RATE: 129 BPM | SYSTOLIC BLOOD PRESSURE: 122 MMHG | TEMPERATURE: 98 F | DIASTOLIC BLOOD PRESSURE: 82 MMHG | RESPIRATION RATE: 16 BRPM | HEIGHT: 71 IN | OXYGEN SATURATION: 97 %

## 2022-08-24 DIAGNOSIS — C21.0 MALIGNANT NEOPLASM OF ANUS, UNSPECIFIED: ICD-10-CM

## 2022-08-24 DIAGNOSIS — K62.9 DISEASE OF ANUS AND RECTUM, UNSPECIFIED: Chronic | ICD-10-CM

## 2022-08-24 LAB
APTT BLD: 24.8 SEC — LOW (ref 27–36.3)
GAS PNL BLDA: SIGNIFICANT CHANGE UP
GLUCOSE BLDC GLUCOMTR-MCNC: 117 MG/DL — HIGH (ref 70–99)
INR BLD: 1.4 RATIO — HIGH (ref 0.88–1.16)
PROTHROM AB SERPL-ACNC: 16.3 SEC — HIGH (ref 10.5–13.4)

## 2022-08-24 PROCEDURE — 44146 PARTIAL REMOVAL OF COLON: CPT | Mod: 22

## 2022-08-24 PROCEDURE — 93010 ELECTROCARDIOGRAM REPORT: CPT

## 2022-08-24 PROCEDURE — 88307 TISSUE EXAM BY PATHOLOGIST: CPT | Mod: 26

## 2022-08-24 PROCEDURE — 88304 TISSUE EXAM BY PATHOLOGIST: CPT | Mod: 26

## 2022-08-24 PROCEDURE — 15734 MUSCLE-SKIN GRAFT TRUNK: CPT

## 2022-08-24 DEVICE — CLIP APPLIER ETHICON LIGACLIP 11.5" MEDIUM: Type: IMPLANTABLE DEVICE | Status: FUNCTIONAL

## 2022-08-24 DEVICE — STAPLER COVIDIEN GIA 80-3.0MM PURPLE: Type: IMPLANTABLE DEVICE | Status: FUNCTIONAL

## 2022-08-24 DEVICE — CLIP APPLIER COVIDIEN SURGICLIP III 9" SM: Type: IMPLANTABLE DEVICE | Status: FUNCTIONAL

## 2022-08-24 DEVICE — LIGATING CLIPS WECK HORIZON MEDIUM (BLUE) 24: Type: IMPLANTABLE DEVICE | Status: FUNCTIONAL

## 2022-08-24 DEVICE — LIGATING CLIPS WECK HORIZON LARGE (ORANGE) 24: Type: IMPLANTABLE DEVICE | Status: FUNCTIONAL

## 2022-08-24 DEVICE — STAPLER COVIDIEN GIA 80-3.0MM PURPLE RELOAD: Type: IMPLANTABLE DEVICE | Status: FUNCTIONAL

## 2022-08-24 RX ORDER — HYDROMORPHONE HYDROCHLORIDE 2 MG/ML
0.5 INJECTION INTRAMUSCULAR; INTRAVENOUS; SUBCUTANEOUS
Refills: 0 | Status: DISCONTINUED | OUTPATIENT
Start: 2022-08-24 | End: 2022-08-25

## 2022-08-24 RX ORDER — NALOXONE HYDROCHLORIDE 4 MG/.1ML
0.1 SPRAY NASAL
Refills: 0 | Status: DISCONTINUED | OUTPATIENT
Start: 2022-08-24 | End: 2022-08-27

## 2022-08-24 RX ORDER — CHLORHEXIDINE GLUCONATE 213 G/1000ML
1 SOLUTION TOPICAL DAILY
Refills: 0 | Status: DISCONTINUED | OUTPATIENT
Start: 2022-08-24 | End: 2022-09-02

## 2022-08-24 RX ORDER — PIPERACILLIN AND TAZOBACTAM 4; .5 G/20ML; G/20ML
3.38 INJECTION, POWDER, LYOPHILIZED, FOR SOLUTION INTRAVENOUS EVERY 8 HOURS
Refills: 0 | Status: DISCONTINUED | OUTPATIENT
Start: 2022-08-25 | End: 2022-09-02

## 2022-08-24 RX ORDER — ATORVASTATIN CALCIUM 80 MG/1
1 TABLET, FILM COATED ORAL
Qty: 0 | Refills: 0 | DISCHARGE

## 2022-08-24 RX ORDER — ONDANSETRON 8 MG/1
4 TABLET, FILM COATED ORAL ONCE
Refills: 0 | Status: DISCONTINUED | OUTPATIENT
Start: 2022-08-24 | End: 2022-08-25

## 2022-08-24 RX ORDER — ONDANSETRON 8 MG/1
4 TABLET, FILM COATED ORAL EVERY 6 HOURS
Refills: 0 | Status: DISCONTINUED | OUTPATIENT
Start: 2022-08-24 | End: 2022-08-27

## 2022-08-24 RX ORDER — SODIUM CHLORIDE 9 MG/ML
1000 INJECTION, SOLUTION INTRAVENOUS
Refills: 0 | Status: DISCONTINUED | OUTPATIENT
Start: 2022-08-24 | End: 2022-08-25

## 2022-08-24 RX ORDER — HYDROMORPHONE HYDROCHLORIDE 2 MG/ML
30 INJECTION INTRAMUSCULAR; INTRAVENOUS; SUBCUTANEOUS
Refills: 0 | Status: DISCONTINUED | OUTPATIENT
Start: 2022-08-24 | End: 2022-08-24

## 2022-08-24 RX ORDER — SODIUM CHLORIDE 9 MG/ML
1000 INJECTION, SOLUTION INTRAVENOUS
Refills: 0 | Status: DISCONTINUED | OUTPATIENT
Start: 2022-08-24 | End: 2022-08-26

## 2022-08-24 RX ORDER — HEPARIN SODIUM 5000 [USP'U]/ML
5000 INJECTION INTRAVENOUS; SUBCUTANEOUS EVERY 12 HOURS
Refills: 0 | Status: DISCONTINUED | OUTPATIENT
Start: 2022-08-24 | End: 2022-08-31

## 2022-08-24 RX ORDER — ONDANSETRON 8 MG/1
4 TABLET, FILM COATED ORAL EVERY 6 HOURS
Refills: 0 | Status: DISCONTINUED | OUTPATIENT
Start: 2022-08-24 | End: 2022-09-02

## 2022-08-24 RX ORDER — FENTANYL CITRATE 50 UG/ML
25 INJECTION INTRAVENOUS
Refills: 0 | Status: DISCONTINUED | OUTPATIENT
Start: 2022-08-24 | End: 2022-08-25

## 2022-08-24 RX ORDER — HYDROMORPHONE HYDROCHLORIDE 2 MG/ML
30 INJECTION INTRAMUSCULAR; INTRAVENOUS; SUBCUTANEOUS
Refills: 0 | Status: DISCONTINUED | OUTPATIENT
Start: 2022-08-24 | End: 2022-08-25

## 2022-08-24 RX ORDER — NALOXONE HYDROCHLORIDE 4 MG/.1ML
0.1 SPRAY NASAL
Refills: 0 | Status: DISCONTINUED | OUTPATIENT
Start: 2022-08-24 | End: 2022-09-02

## 2022-08-24 RX ORDER — PIPERACILLIN AND TAZOBACTAM 4; .5 G/20ML; G/20ML
3.38 INJECTION, POWDER, LYOPHILIZED, FOR SOLUTION INTRAVENOUS ONCE
Refills: 0 | Status: COMPLETED | OUTPATIENT
Start: 2022-08-24 | End: 2022-08-24

## 2022-08-24 RX ORDER — ASPIRIN/CALCIUM CARB/MAGNESIUM 324 MG
0 TABLET ORAL
Qty: 0 | Refills: 0 | DISCHARGE

## 2022-08-24 RX ADMIN — PIPERACILLIN AND TAZOBACTAM 200 GRAM(S): 4; .5 INJECTION, POWDER, LYOPHILIZED, FOR SOLUTION INTRAVENOUS at 23:17

## 2022-08-24 RX ADMIN — CHLORHEXIDINE GLUCONATE 1 APPLICATION(S): 213 SOLUTION TOPICAL at 13:16

## 2022-08-24 RX ADMIN — SODIUM CHLORIDE 30 MILLILITER(S): 9 INJECTION, SOLUTION INTRAVENOUS at 13:16

## 2022-08-24 RX ADMIN — HYDROMORPHONE HYDROCHLORIDE 30 MILLILITER(S): 2 INJECTION INTRAMUSCULAR; INTRAVENOUS; SUBCUTANEOUS at 22:41

## 2022-08-24 NOTE — H&P ADULT - NSHPPHYSICALEXAM_GEN_ALL_CORE
PHYSICAL EXAM:  Constitutional: A&Ox3, NAD  Respiratory: Unlabored breathing  Abdomen: Soft, nondistended, NTTP. No rebound or guarding. Ostomy pink with gas and stool in bag.      EKG in PACU showing sinus tachycardia 121 BPM

## 2022-08-24 NOTE — BRIEF OPERATIVE NOTE - OPERATION/FINDINGS
Lower midline laparotomy incision. The loop ileostomy was stapled on the distal nonfucntional end approximately 5 cm from the fascia. The peritoneal attachments of the sigmoid colon and rectum were divided bilaterally. The bilateral ureters were identified and protected throughout the duration of the operation. Once the sacral promontory was reached, the rectum was divided posteriorly along the mesorectal excision plane. The lateral stalks were taken as well as the anterior pelvic attachments. The dissection was carried out approximately to the levators. The perineal portion of the procedure commenced and the anus and rectum were dissected free from the ischiorectal fossa. Anteriorly there was a large abscess cavity that was identified preoperatively. After 360 degree dissection was accomplished, the rectum and sigmoid colon were delivered through the perineal wound. The specimen was sent to pathology.    Our plastic surgery colleagues then proceeded with VRAM flap coverage, this will be dictated separately. Lower midline laparotomy incision. The loop ileostomy was stapled on the distal nonfucntional end approximately 5 cm from the fascia. The peritoneal attachments of the sigmoid colon and rectum were divided bilaterally. The bilateral ureters were identified and protected throughout the duration of the operation. Once the sacral promontory was reached, the rectum was divided posteriorly along the mesorectal excision plane. The lateral stalks were taken as well as the anterior pelvic attachments. The dissection was carried out approximately to the levators. The perineal portion of the procedure commenced and the anus and rectum were dissected free from the ischiorectal fossa. Anteriorly there was a large abscess cavity that was identified preoperatively. After 360 degree dissection was accomplished, the rectum and sigmoid colon were delivered through the perineal wound. The specimen was sent to pathology.    The old colostomy was taken down, approximately 6 cm excised, and a new end colostomy was fashioned in Breanna manner.    Our plastic surgery colleagues then proceeded with VRAM flap coverage, this will be dictated separately.

## 2022-08-24 NOTE — H&P ADULT - ASSESSMENT
57 year old male with Hx rectal/prostate cancer s/p radiation, hx of sigmoid colostomy in March with Dr. Avilez, s/p hospitalization and SICU admission for lower GIB, discharged 8/19, now presenting for scheduled APR.    Plan:  - plan for APR today  - monitor vitals and tachycardia     A Team Surgery [id number]

## 2022-08-24 NOTE — H&P ADULT - HISTORY OF PRESENT ILLNESS
57M with Hx rectal/prostate cancer s/p radiation, hx of sigmoid colostomy in March with Dr. Avilez presented to [facility] ED on 8/16/22 with lower GI bleed that began at 9pm 8/15. Patient was transferred from [facility], s/p 2 unit PRBC. MTP initiated and patient received additional 2 PRBC, 1 FFP, and 1 plt. In [facility] ED, patient tachycardic to 130s, SBP 100s. Mentating appropriately, denies dizziness. Large clot from rectum removed, and rectum packed with kerlix wrapped in surgicel. Admitted to colorectal surgery service and transferred to SICU for hemorrhage watch. Patient discharged from hospital and now presents to [facility] for scheduled abdominal peritoneal resection with Dr. Betancourt.  Patient in usual state of health per himself and wife at bedside and has no complaints at this time.     57M with Hx rectal/prostate cancer s/p radiation, hx of sigmoid colostomy in March with Dr. Avilez presented to [facility] ED on 8/16/22 with lower GI bleed that began at 9pm 8/15. Patient was transferred from [facility], s/p 2 unit PRBC. MTP initiated, amitted to colorectal surgery service and transferred to SICU for hemorrhage watch. Patient discharged from hospital and now presents to [facility] for scheduled abdominal peritoneal resection with Dr. Betancourt.  Patient in usual state of health per himself and wife at bedside and has no complaints at this time.

## 2022-08-24 NOTE — H&P ADULT - ATTENDING COMMENTS
s/p Chemoradiation for anal cancer and radiation for prostate cancer.  Pt now with severe perianal pain, mucus drainage, bleeding and perianal abscess.  Pt is s/p colostomy/proximal diversion with minimal symptomatic relief  -Palliative APR today w/ flap reconstruction per plastic surgery  -risks and benefits have been reviewed at length  -all questions answered

## 2022-08-25 ENCOUNTER — NON-APPOINTMENT (OUTPATIENT)
Age: 57
End: 2022-08-25

## 2022-08-25 LAB
ANION GAP SERPL CALC-SCNC: 11 MMOL/L — SIGNIFICANT CHANGE UP (ref 7–14)
BUN SERPL-MCNC: 7 MG/DL — SIGNIFICANT CHANGE UP (ref 7–23)
CALCIUM SERPL-MCNC: 8.6 MG/DL — SIGNIFICANT CHANGE UP (ref 8.4–10.5)
CHLORIDE SERPL-SCNC: 100 MMOL/L — SIGNIFICANT CHANGE UP (ref 98–107)
CO2 SERPL-SCNC: 25 MMOL/L — SIGNIFICANT CHANGE UP (ref 22–31)
CREAT SERPL-MCNC: 0.61 MG/DL — SIGNIFICANT CHANGE UP (ref 0.5–1.3)
EGFR: 113 ML/MIN/1.73M2 — SIGNIFICANT CHANGE UP
GLUCOSE SERPL-MCNC: 174 MG/DL — HIGH (ref 70–99)
HCT VFR BLD CALC: 31.3 % — LOW (ref 39–50)
HGB BLD-MCNC: 10.2 G/DL — LOW (ref 13–17)
MAGNESIUM SERPL-MCNC: 2.1 MG/DL — SIGNIFICANT CHANGE UP (ref 1.6–2.6)
MCHC RBC-ENTMCNC: 28.3 PG — SIGNIFICANT CHANGE UP (ref 27–34)
MCHC RBC-ENTMCNC: 32.6 GM/DL — SIGNIFICANT CHANGE UP (ref 32–36)
MCV RBC AUTO: 86.7 FL — SIGNIFICANT CHANGE UP (ref 80–100)
NRBC # BLD: 0 /100 WBCS — SIGNIFICANT CHANGE UP (ref 0–0)
NRBC # FLD: 0 K/UL — SIGNIFICANT CHANGE UP (ref 0–0)
PHOSPHATE SERPL-MCNC: 2.3 MG/DL — LOW (ref 2.5–4.5)
PLATELET # BLD AUTO: 432 K/UL — HIGH (ref 150–400)
POTASSIUM SERPL-MCNC: 3.8 MMOL/L — SIGNIFICANT CHANGE UP (ref 3.5–5.3)
POTASSIUM SERPL-SCNC: 3.8 MMOL/L — SIGNIFICANT CHANGE UP (ref 3.5–5.3)
RBC # BLD: 3.61 M/UL — LOW (ref 4.2–5.8)
RBC # FLD: 15.9 % — HIGH (ref 10.3–14.5)
SODIUM SERPL-SCNC: 136 MMOL/L — SIGNIFICANT CHANGE UP (ref 135–145)
WBC # BLD: 16.39 K/UL — HIGH (ref 3.8–10.5)
WBC # FLD AUTO: 16.39 K/UL — HIGH (ref 3.8–10.5)

## 2022-08-25 RX ORDER — HYDROMORPHONE HYDROCHLORIDE 2 MG/ML
0.25 INJECTION INTRAMUSCULAR; INTRAVENOUS; SUBCUTANEOUS ONCE
Refills: 0 | Status: DISCONTINUED | OUTPATIENT
Start: 2022-08-25 | End: 2022-08-25

## 2022-08-25 RX ORDER — LANOLIN ALCOHOL/MO/W.PET/CERES
3 CREAM (GRAM) TOPICAL ONCE
Refills: 0 | Status: COMPLETED | OUTPATIENT
Start: 2022-08-25 | End: 2022-08-25

## 2022-08-25 RX ORDER — ACETAMINOPHEN 500 MG
1000 TABLET ORAL EVERY 6 HOURS
Refills: 0 | Status: COMPLETED | OUTPATIENT
Start: 2022-08-25 | End: 2022-08-26

## 2022-08-25 RX ORDER — GABAPENTIN 400 MG/1
300 CAPSULE ORAL THREE TIMES A DAY
Refills: 0 | Status: DISCONTINUED | OUTPATIENT
Start: 2022-08-25 | End: 2022-08-27

## 2022-08-25 RX ORDER — MORPHINE SULFATE 50 MG/1
60 CAPSULE, EXTENDED RELEASE ORAL EVERY 8 HOURS
Refills: 0 | Status: DISCONTINUED | OUTPATIENT
Start: 2022-08-25 | End: 2022-08-28

## 2022-08-25 RX ORDER — HYDROMORPHONE HYDROCHLORIDE 2 MG/ML
4 INJECTION INTRAMUSCULAR; INTRAVENOUS; SUBCUTANEOUS EVERY 4 HOURS
Refills: 0 | Status: DISCONTINUED | OUTPATIENT
Start: 2022-08-25 | End: 2022-08-28

## 2022-08-25 RX ORDER — POTASSIUM CHLORIDE 20 MEQ
10 PACKET (EA) ORAL ONCE
Refills: 0 | Status: COMPLETED | OUTPATIENT
Start: 2022-08-25 | End: 2022-08-25

## 2022-08-25 RX ORDER — HYDROMORPHONE HYDROCHLORIDE 2 MG/ML
1 INJECTION INTRAMUSCULAR; INTRAVENOUS; SUBCUTANEOUS EVERY 4 HOURS
Refills: 0 | Status: DISCONTINUED | OUTPATIENT
Start: 2022-08-25 | End: 2022-08-28

## 2022-08-25 RX ADMIN — HYDROMORPHONE HYDROCHLORIDE 4 MILLIGRAM(S): 2 INJECTION INTRAMUSCULAR; INTRAVENOUS; SUBCUTANEOUS at 11:42

## 2022-08-25 RX ADMIN — PIPERACILLIN AND TAZOBACTAM 25 GRAM(S): 4; .5 INJECTION, POWDER, LYOPHILIZED, FOR SOLUTION INTRAVENOUS at 22:39

## 2022-08-25 RX ADMIN — Medication 63.75 MILLIMOLE(S): at 14:04

## 2022-08-25 RX ADMIN — GABAPENTIN 300 MILLIGRAM(S): 400 CAPSULE ORAL at 22:42

## 2022-08-25 RX ADMIN — HYDROMORPHONE HYDROCHLORIDE 1 MILLIGRAM(S): 2 INJECTION INTRAMUSCULAR; INTRAVENOUS; SUBCUTANEOUS at 19:10

## 2022-08-25 RX ADMIN — Medication 100 MILLIEQUIVALENT(S): at 13:03

## 2022-08-25 RX ADMIN — HYDROMORPHONE HYDROCHLORIDE 30 MILLILITER(S): 2 INJECTION INTRAMUSCULAR; INTRAVENOUS; SUBCUTANEOUS at 02:28

## 2022-08-25 RX ADMIN — HYDROMORPHONE HYDROCHLORIDE 4 MILLIGRAM(S): 2 INJECTION INTRAMUSCULAR; INTRAVENOUS; SUBCUTANEOUS at 13:11

## 2022-08-25 RX ADMIN — HYDROMORPHONE HYDROCHLORIDE 4 MILLIGRAM(S): 2 INJECTION INTRAMUSCULAR; INTRAVENOUS; SUBCUTANEOUS at 18:22

## 2022-08-25 RX ADMIN — HYDROMORPHONE HYDROCHLORIDE 0.25 MILLIGRAM(S): 2 INJECTION INTRAMUSCULAR; INTRAVENOUS; SUBCUTANEOUS at 07:30

## 2022-08-25 RX ADMIN — HEPARIN SODIUM 5000 UNIT(S): 5000 INJECTION INTRAVENOUS; SUBCUTANEOUS at 05:26

## 2022-08-25 RX ADMIN — HYDROMORPHONE HYDROCHLORIDE 1 MILLIGRAM(S): 2 INJECTION INTRAMUSCULAR; INTRAVENOUS; SUBCUTANEOUS at 14:51

## 2022-08-25 RX ADMIN — PIPERACILLIN AND TAZOBACTAM 25 GRAM(S): 4; .5 INJECTION, POWDER, LYOPHILIZED, FOR SOLUTION INTRAVENOUS at 14:04

## 2022-08-25 RX ADMIN — Medication 400 MILLIGRAM(S): at 09:57

## 2022-08-25 RX ADMIN — MORPHINE SULFATE 60 MILLIGRAM(S): 50 CAPSULE, EXTENDED RELEASE ORAL at 13:07

## 2022-08-25 RX ADMIN — HYDROMORPHONE HYDROCHLORIDE 30 MILLILITER(S): 2 INJECTION INTRAMUSCULAR; INTRAVENOUS; SUBCUTANEOUS at 08:26

## 2022-08-25 RX ADMIN — HYDROMORPHONE HYDROCHLORIDE 30 MILLILITER(S): 2 INJECTION INTRAMUSCULAR; INTRAVENOUS; SUBCUTANEOUS at 07:15

## 2022-08-25 RX ADMIN — PIPERACILLIN AND TAZOBACTAM 25 GRAM(S): 4; .5 INJECTION, POWDER, LYOPHILIZED, FOR SOLUTION INTRAVENOUS at 05:27

## 2022-08-25 RX ADMIN — Medication 3 MILLIGRAM(S): at 01:07

## 2022-08-25 RX ADMIN — HYDROMORPHONE HYDROCHLORIDE 4 MILLIGRAM(S): 2 INJECTION INTRAMUSCULAR; INTRAVENOUS; SUBCUTANEOUS at 15:47

## 2022-08-25 RX ADMIN — HYDROMORPHONE HYDROCHLORIDE 0.5 MILLIGRAM(S): 2 INJECTION INTRAMUSCULAR; INTRAVENOUS; SUBCUTANEOUS at 10:24

## 2022-08-25 RX ADMIN — HYDROMORPHONE HYDROCHLORIDE 1 MILLIGRAM(S): 2 INJECTION INTRAMUSCULAR; INTRAVENOUS; SUBCUTANEOUS at 14:04

## 2022-08-25 RX ADMIN — HYDROMORPHONE HYDROCHLORIDE 0.25 MILLIGRAM(S): 2 INJECTION INTRAMUSCULAR; INTRAVENOUS; SUBCUTANEOUS at 05:51

## 2022-08-25 RX ADMIN — HYDROMORPHONE HYDROCHLORIDE 1 MILLIGRAM(S): 2 INJECTION INTRAMUSCULAR; INTRAVENOUS; SUBCUTANEOUS at 20:08

## 2022-08-25 RX ADMIN — MORPHINE SULFATE 60 MILLIGRAM(S): 50 CAPSULE, EXTENDED RELEASE ORAL at 22:38

## 2022-08-25 RX ADMIN — Medication 400 MILLIGRAM(S): at 17:36

## 2022-08-25 RX ADMIN — HEPARIN SODIUM 5000 UNIT(S): 5000 INJECTION INTRAVENOUS; SUBCUTANEOUS at 17:37

## 2022-08-25 RX ADMIN — HYDROMORPHONE HYDROCHLORIDE 1 MILLIGRAM(S): 2 INJECTION INTRAMUSCULAR; INTRAVENOUS; SUBCUTANEOUS at 23:27

## 2022-08-25 NOTE — PROGRESS NOTE ADULT - SUBJECTIVE AND OBJECTIVE BOX
Patient is a 57y old  Male who presents with a chief complaint of scheduled APR (25 Aug 2022 11:34)    Patient known to [facility] Dr. Mathews for the management of prostate cancer. Seen this morning, s/p APR 08/24. He feels well but of course with post-op abdominal pain. No other complaints.     MEDICATIONS  (STANDING):  acetaminophen   IVPB .. 1000 milliGRAM(s) IV Intermittent every 6 hours  chlorhexidine 2% Cloths 1 Application(s) Topical daily  heparin   Injectable 5000 Unit(s) SubCutaneous every 12 hours  lactated ringers. 1000 milliLiter(s) (100 mL/Hr) IV Continuous <Continuous>  morphine ER Tablet 60 milliGRAM(s) Oral every 8 hours  piperacillin/tazobactam IVPB.. 3.375 Gram(s) IV Intermittent every 8 hours    MEDICATIONS  (PRN):  HYDROmorphone   Tablet 4 milliGRAM(s) Oral every 4 hours PRN Moderate- Severe Pain (4 - 10)  HYDROmorphone  Injectable 1 milliGRAM(s) IV Push every 4 hours PRN Severe Breakthrough Pain (7 - 10)  naloxone Injectable 0.1 milliGRAM(s) IV Push every 3 minutes PRN For ANY of the following changes in patient status:  A. RR LESS THAN 10 breaths per minute, B. Oxygen saturation LESS THAN 90%, C. Sedation score of 6  naloxone Injectable 0.1 milliGRAM(s) IV Push every 3 minutes PRN For ANY of the following changes in patient status:  A. RR LESS THAN 10 breaths per minute, B. Oxygen saturation LESS THAN 90%, C. Sedation score of 6  ondansetron Injectable 4 milliGRAM(s) IV Push every 6 hours PRN Nausea  ondansetron Injectable 4 milliGRAM(s) IV Push every 6 hours PRN Nausea        Vital Signs Last 24 Hrs  T(C): 37.1 (25 Aug 2022 14:00), Max: 37.3 (25 Aug 2022 06:00)  T(F): 98.8 (25 Aug 2022 14:00), Max: 99.1 (25 Aug 2022 06:00)  HR: 108 (25 Aug 2022 14:00) (88 - 108)  BP: 126/88 (25 Aug 2022 14:00) (117/75 - 138/91)  BP(mean): 92 (25 Aug 2022 02:00) (86 - 103)  RR: 20 (25 Aug 2022 14:00) (12 - 20)  SpO2: 99% (25 Aug 2022 14:00) (97% - 100%)    Parameters below as of 25 Aug 2022 14:00  Patient On (Oxygen Delivery Method): room air        PE  NAD  Awake, alert  Anicteric, MMM  RRR  CTAB  Abdomen with dressing and ostomy, clean, dry, and intact  No c/c/e  No rash grossly                          10.2   16.39 )-----------( 432      ( 25 Aug 2022 09:45 )             31.3       08-25    136  |  100  |  7   ----------------------------<  174<H>  3.8   |  25  |  0.61    Ca    8.6      25 Aug 2022 09:45  Phos  2.3     08-25  Mg     2.10     08-25

## 2022-08-25 NOTE — PROGRESS NOTE ADULT - ASSESSMENT
A/P: 57y Male with Hx rectal/prostate cancer s/p radiation, hx of sigmoid colostomy in March with Dr. Avilez, s/p hospitalization and SICU admission for lower GIB, discharged 8/19, now s/p APR with end colostomy creation, VRAM flap closure.    -Strict bedrest  -DVT prophylaxis w/ SQDH.   -Zosyn  -Strict I&O's  -Monitor TALI drain output  -Analgesia -PCA  -LR 100ml/hr  -Sahu  -Diet:CLD A/P: 57y Male with Hx rectal/prostate cancer s/p radiation, hx of sigmoid colostomy in March with Dr. Avilez, s/p hospitalization and SICU admission for lower GIB, discharged 8/19, now s/p APR with end colostomy creation, VRAM flap closure.    - Pain service for increase vs change of PCA  - instructed on use of IS  - Diet: CLD  - await GI function   - Strict I&O's  - Monitor TALI drain output  - LR 100ml/hr  - continue Sahu due to low anastamosis   - Zosyn  - Strict bedrest as per plastics   - DVT prophylaxis w/ SQDH.       A Team Surgery   [id number]

## 2022-08-25 NOTE — CHART NOTE - NSCHARTNOTEFT_GEN_A_CORE
General Surgery Post op Check    Pt seen and examined without complaints. Pain is controlled. Denies SOB/CP/N/V.     Vital Signs Last 24 Hrs  T(C): 37.2 (25 Aug 2022 02:52), Max: 37.2 (25 Aug 2022 02:52)  T(F): 99 (25 Aug 2022 02:52), Max: 99 (25 Aug 2022 02:52)  HR: 97 (25 Aug 2022 02:52) (88 - 129)  BP: 134/83 (25 Aug 2022 02:52) (117/75 - 138/91)  BP(mean): 92 (25 Aug 2022 02:00) (86 - 103)  RR: 18 (25 Aug 2022 02:52) (12 - 20)  SpO2: 98% (25 Aug 2022 02:52) (97% - 100%)    Parameters below as of 25 Aug 2022 02:52  Patient On (Oxygen Delivery Method): room air        I&O's Summary    24 Aug 2022 07:01  -  25 Aug 2022 03:05  --------------------------------------------------------  IN: 550 mL / OUT: 835 mL / NET: -285 mL        Physical Exam  Gen: NAD, A&Ox3  Pulm: No respiratory distress, no subcostal retractions  CV: RRR, no JVD  Abd: Soft, appropriately tender in RLQ and LLQ, ND, midline abthera holding suction  Drains: TALI x 2 with SS output  Extremities:  FROM, warm and well perfused, equal bilateral muscle strength      A/P: 57y Male with Hx rectal/prostate cancer s/p radiation, hx of sigmoid colostomy in March with Dr. Avilez, s/p hospitalization and SICU admission for lower GIB, discharged 8/19, now s/p APR with end colostomy creation, VRAM flap closure.    -Strict bedrest  -DVT prophylaxis w/ SQDH.   -Zosyn  -Strict I&O's  -Monitor TALI drain output  -Analgesia -PCA  -LR 100ml/hr  -Sahu  -Diet:CLD      Robbin Gamboa, PGY-1

## 2022-08-25 NOTE — PROGRESS NOTE ADULT - ASSESSMENT
1. Prostate cancer with history of recurrence   - Receives Lupron q3 months, last dose 07/13/22  - PSA undetectable since 10/2021   - Continued management with Dr. Mathews after discharge     2. Anorectal squamous cell ca, lower GI bleed   - Dx 2021, treated with RT and mitomycin/xeloda  - s/p APR 08/24  - Continued management per CRS  - Follow up with oncologist after discharge       Constance Brush PA-C  Hematology/Oncology  [facility]  [phone] (office)  [phone] (alt office)  Evenings and weekends please call MD on call or office

## 2022-08-25 NOTE — PROGRESS NOTE ADULT - SUBJECTIVE AND OBJECTIVE BOX
General Surgery Post op Check    Pt seen and examined without complaints. Pain is controlled. Denies SOB/CP/N/V.     Vital Signs Last 24 Hrs  T(C): 37.2 (25 Aug 2022 02:52), Max: 37.2 (25 Aug 2022 02:52)  T(F): 99 (25 Aug 2022 02:52), Max: 99 (25 Aug 2022 02:52)  HR: 97 (25 Aug 2022 02:52) (88 - 129)  BP: 134/83 (25 Aug 2022 02:52) (117/75 - 138/91)  BP(mean): 92 (25 Aug 2022 02:00) (86 - 103)  RR: 18 (25 Aug 2022 02:52) (12 - 20)  SpO2: 98% (25 Aug 2022 02:52) (97% - 100%)    Parameters below as of 25 Aug 2022 02:52  Patient On (Oxygen Delivery Method): room air        I&O's Summary    24 Aug 2022 07:01  -  25 Aug 2022 03:05  --------------------------------------------------------  IN: 550 mL / OUT: 835 mL / NET: -285 mL        Physical Exam  Gen: NAD, A&Ox3  Pulm: No respiratory distress, no subcostal retractions  CV: RRR, no JVD  Abd: Soft, appropriately tender in RLQ and LLQ, ND, midline abthera holding suction  Drains: TALI x 2 with SS output  Extremities:  FROM, warm and well perfused, equal bilateral muscle strength      A/P: 57y Male with Hx rectal/prostate cancer s/p radiation, hx of sigmoid colostomy in March with Dr. Avilez, s/p hospitalization and SICU admission for lower GIB, discharged 8/19, now s/p APR with end colostomy creation, VRAM flap closure.    -Strict bedrest  -DVT prophylaxis w/ SQDH.   -Zosyn  -Strict I&O's  -Monitor TALI drain output  -Analgesia -PCA  -LR 100ml/hr  -Sahu  -Diet:CLD      Robbin Gamboa, PGY-1     Overnight events:  - POC  - PCA stopped function at 4:50 and pt was in pain, given 0.25 dilaudid until PCA can be replaced by pain service A Team Surgery Progress Note     Overnight events:  - POC  - PCA stopped function at 4:50 and pt was in pain, given 0.25 dilaudid until PCA can be replaced by pain service.    S: Patient resting comfortably on morning rounds. Post-operative abdominal pain as expected. PCA not covering post-surgical pain well enough. Denies nausea or vomiting. No new symptoms reported. No flatus or BM overnight.     MEDICATIONS  (STANDING):  acetaminophen   IVPB .. 1000 milliGRAM(s) IV Intermittent every 6 hours  chlorhexidine 2% Cloths 1 Application(s) Topical daily  heparin   Injectable 5000 Unit(s) SubCutaneous every 12 hours  HYDROmorphone PCA (1 mG/mL) 30 milliLiter(s) PCA Continuous PCA Continuous  lactated ringers. 1000 milliLiter(s) (30 mL/Hr) IV Continuous <Continuous>  lactated ringers. 1000 milliLiter(s) (100 mL/Hr) IV Continuous <Continuous>  piperacillin/tazobactam IVPB.. 3.375 Gram(s) IV Intermittent every 8 hours    MEDICATIONS  (PRN):  HYDROmorphone PCA (1 mG/mL) Rescue Clinician Bolus 0.5 milliGRAM(s) IV Push every 15 minutes PRN for Pain Scale GREATER THAN 6  naloxone Injectable 0.1 milliGRAM(s) IV Push every 3 minutes PRN For ANY of the following changes in patient status:  A. RR LESS THAN 10 breaths per minute, B. Oxygen saturation LESS THAN 90%, C. Sedation score of 6  naloxone Injectable 0.1 milliGRAM(s) IV Push every 3 minutes PRN For ANY of the following changes in patient status:  A. RR LESS THAN 10 breaths per minute, B. Oxygen saturation LESS THAN 90%, C. Sedation score of 6  ondansetron Injectable 4 milliGRAM(s) IV Push every 6 hours PRN Nausea  ondansetron Injectable 4 milliGRAM(s) IV Push every 6 hours PRN Nausea      Physical Exam:    T(C): 37.3 (08-25-22 @ 06:00), Max: 37.3 (08-25-22 @ 06:00)  HR: 98 (08-25-22 @ 06:00) (88 - 129)  BP: 133/84 (08-25-22 @ 06:00) (117/75 - 138/91)  RR: 17 (08-25-22 @ 06:00) (12 - 20)  SpO2: 98% (08-25-22 @ 06:00) (97% - 100%)      08-24-22 @ 07:01  -  08-25-22 @ 07:00  --------------------------------------------------------  IN: 550 mL / OUT: 1165 mL / NET: -615 mL      General: NAD, AOx3    Abdominal: Soft, minimally distended, appropriately tender to palpation, dressings C/D/I         LABS:    Pending

## 2022-08-25 NOTE — PROGRESS NOTE ADULT - SUBJECTIVE AND OBJECTIVE BOX
Anesthesia Pain Management Service- Attending Addendum    SUBJECTIVE: Patient's pain control inadequate    Therapy:	  [ X] IV PCA	   [ ] Epidural           [ ] s/p Spinal Opoid              [ ] Postpartum infusion	  [ ] Patient controlled regional anesthesia (PCRA)    [ ] prn Analgesics    Allergies    Allergy Status Unknown  No Known Allergies    Intolerances      MEDICATIONS  (STANDING):  acetaminophen   IVPB .. 1000 milliGRAM(s) IV Intermittent every 6 hours  chlorhexidine 2% Cloths 1 Application(s) Topical daily  heparin   Injectable 5000 Unit(s) SubCutaneous every 12 hours  lactated ringers. 1000 milliLiter(s) (100 mL/Hr) IV Continuous <Continuous>  morphine ER Tablet 60 milliGRAM(s) Oral every 8 hours  piperacillin/tazobactam IVPB.. 3.375 Gram(s) IV Intermittent every 8 hours  potassium chloride  10 mEq/100 mL IVPB 10 milliEquivalent(s) IV Intermittent once    MEDICATIONS  (PRN):  HYDROmorphone   Tablet 4 milliGRAM(s) Oral every 4 hours PRN Moderate- Severe Pain (4 - 10)  HYDROmorphone  Injectable 1 milliGRAM(s) IV Push every 4 hours PRN Severe Breakthrough Pain (7 - 10)  naloxone Injectable 0.1 milliGRAM(s) IV Push every 3 minutes PRN For ANY of the following changes in patient status:  A. RR LESS THAN 10 breaths per minute, B. Oxygen saturation LESS THAN 90%, C. Sedation score of 6  naloxone Injectable 0.1 milliGRAM(s) IV Push every 3 minutes PRN For ANY of the following changes in patient status:  A. RR LESS THAN 10 breaths per minute, B. Oxygen saturation LESS THAN 90%, C. Sedation score of 6  ondansetron Injectable 4 milliGRAM(s) IV Push every 6 hours PRN Nausea  ondansetron Injectable 4 milliGRAM(s) IV Push every 6 hours PRN Nausea      OBJECTIVE:   [X] No new signs     [ ] Other:    Side Effects:  [X ] None			[ ] Other:      ASSESSMENT/PLAN  -Discontinue current therapy. Patient follows with palliative care taking up to 100 mg MSContin TID. He likely will need his baseline pain medication with supplemental PRNs. Will d/c the PCA to provide long acting opiates and supplement with PRN oxycodone. Please contact his palliative care doctor for further pain recommendations.     [ ] Therapy changed to:    [ ] IV PCA       [ ] Epidural     [ X] prn Analgesics     Comments: Pain management per primary team, APS to sign off    Note entered after patient seen

## 2022-08-25 NOTE — PATIENT PROFILE ADULT - FALL HARM RISK - RISK INTERVENTIONS
Assistance OOB with selected safe patient handling equipment/Assistance with ambulation/Communicate Fall Risk and Risk Factors to all staff, patient, and family/Monitor gait and stability/Reinforce activity limits and safety measures with patient and family/Sit up slowly, dangle for a short time, stand at bedside before walking/Use of alarms - bed, chair and/or voice tab/Visual Cue: Yellow wristband/Bed in lowest position, wheels locked, appropriate side rails in place/Call bell, personal items and telephone in reach/Instruct patient to call for assistance before getting out of bed or chair/Non-slip footwear when patient is out of bed/[place] to call system/Physically safe environment - no spills, clutter or unnecessary equipment/Purposeful Proactive Rounding/Room/bathroom lighting operational, light cord in reach

## 2022-08-25 NOTE — PROGRESS NOTE ADULT - SUBJECTIVE AND OBJECTIVE BOX
Anesthesia Pain Management Service    SUBJECTIVE: Patient reports pain is uncontrolled and IV PCA is not helping. States he takes MSContin ER 100mg TID at home for cancer pain and prescribed by an oncology provider at [facility]. He also states he takes Xanax and smokes marijuana about once a week to help manage the cancer-related pain. Patient tolerating clear liquid diet, no nausea or vomiting.    Pain Scale Score	At rest: _9/10__ 	With Activity: ___ 	[X ] Refer to charted pain scores    THERAPY:    [ ] IV PCA Morphine		[ ] 5 mg/mL	[ ] 1 mg/mL  [X ] IV PCA Hydromorphone	[ ] 5 mg/mL	[X ] 1 mg/mL  [ ] IV PCA Fentanyl		[ ] 50 micrograms/mL    Demand dose __0.2_ lockout __6_ (minutes) Continuous Rate _0__ Total: _4.8__   mg used (in past 24 hrs)      MEDICATIONS  (STANDING):  acetaminophen   IVPB .. 1000 milliGRAM(s) IV Intermittent every 6 hours  chlorhexidine 2% Cloths 1 Application(s) Topical daily  heparin   Injectable 5000 Unit(s) SubCutaneous every 12 hours  lactated ringers. 1000 milliLiter(s) (30 mL/Hr) IV Continuous <Continuous>  lactated ringers. 1000 milliLiter(s) (100 mL/Hr) IV Continuous <Continuous>  morphine ER Tablet 60 milliGRAM(s) Oral every 8 hours  piperacillin/tazobactam IVPB.. 3.375 Gram(s) IV Intermittent every 8 hours    MEDICATIONS  (PRN):  HYDROmorphone   Tablet 4 milliGRAM(s) Oral every 4 hours PRN Moderate- Severe Pain (4 - 10)  HYDROmorphone  Injectable 1 milliGRAM(s) IV Push every 4 hours PRN Severe Breakthrough Pain (7 - 10)  naloxone Injectable 0.1 milliGRAM(s) IV Push every 3 minutes PRN For ANY of the following changes in patient status:  A. RR LESS THAN 10 breaths per minute, B. Oxygen saturation LESS THAN 90%, C. Sedation score of 6  naloxone Injectable 0.1 milliGRAM(s) IV Push every 3 minutes PRN For ANY of the following changes in patient status:  A. RR LESS THAN 10 breaths per minute, B. Oxygen saturation LESS THAN 90%, C. Sedation score of 6  ondansetron Injectable 4 milliGRAM(s) IV Push every 6 hours PRN Nausea  ondansetron Injectable 4 milliGRAM(s) IV Push every 6 hours PRN Nausea      OBJECTIVE: Patient laying in bed.    Sedation Score:	[ X] Alert	[ ] Drowsy 	[ ] Arousable	[ ] Asleep	[ ] Unresponsive    Side Effects:	[X ] None	[ ] Nausea	[ ] Vomiting	[ ] Pruritus  		[ ] Other:    Vital Signs Last 24 Hrs  T(C): 36.5 (25 Aug 2022 09:20), Max: 37.3 (25 Aug 2022 06:00)  T(F): 97.7 (25 Aug 2022 09:20), Max: 99.1 (25 Aug 2022 06:00)  HR: 99 (25 Aug 2022 09:20) (88 - 129)  BP: 118/80 (25 Aug 2022 09:20) (117/75 - 138/91)  BP(mean): 92 (25 Aug 2022 02:00) (86 - 103)  RR: 18 (25 Aug 2022 09:20) (12 - 20)  SpO2: 99% (25 Aug 2022 09:20) (97% - 100%)    Parameters below as of 25 Aug 2022 09:20  Patient On (Oxygen Delivery Method): room air        ASSESSMENT/ PLAN    Therapy to  be:	[ ] Continue   [ X] Discontinued   [X ] Change to prn Analgesics    Documentation and Verification of current medications:   [X] Done	[ ] Not done, not elligible    Comments: After reviewing IStop, patient has multiple prescriptions for opioids which does show previous prescription for MSContin 100mg TID last dispensed 05/18/2022 and PO Dilaudid 8mg Q4H. Discussed patient with Anesthesia pain attending. Discussed patient with primary team, IV PCA discontinued and recommended transfer to Palliative care for cancer pain management. Ordered MSContin 60mg Q8H, PO Dilaudid 4mg Q4H PRN moderate to severe pain, and Dilaudid 1mg IVP Q4H PRN severe breakthrough pain. PRN Oral/IV opioids and/or Adjuvant non-opioid medication to be ordered at this point.    Progress Note written now but Patient was seen earlier. Anesthesia Pain Management Service    SUBJECTIVE: Patient reports pain is uncontrolled and IV PCA is not helping. States he takes MSContin ER 100mg TID at home for cancer pain and prescribed by an oncology provider at [facility]. He also states he takes Xanax and smokes marijuana about once a week to help manage the cancer-related pain. Patient tolerating clear liquid diet, no nausea or vomiting.    Pain Scale Score	At rest: _9/10__ 	With Activity: ___ 	[X ] Refer to charted pain scores    THERAPY:    [ ] IV PCA Morphine		[ ] 5 mg/mL	[ ] 1 mg/mL  [X ] IV PCA Hydromorphone	[ ] 5 mg/mL	[X ] 1 mg/mL  [ ] IV PCA Fentanyl		[ ] 50 micrograms/mL    Demand dose __0.2_ lockout __6_ (minutes) Continuous Rate _0__ Total: _4.8__   mg used (in past 24 hrs)      MEDICATIONS  (STANDING):  acetaminophen   IVPB .. 1000 milliGRAM(s) IV Intermittent every 6 hours  chlorhexidine 2% Cloths 1 Application(s) Topical daily  heparin   Injectable 5000 Unit(s) SubCutaneous every 12 hours  lactated ringers. 1000 milliLiter(s) (30 mL/Hr) IV Continuous <Continuous>  lactated ringers. 1000 milliLiter(s) (100 mL/Hr) IV Continuous <Continuous>  morphine ER Tablet 60 milliGRAM(s) Oral every 8 hours  piperacillin/tazobactam IVPB.. 3.375 Gram(s) IV Intermittent every 8 hours    MEDICATIONS  (PRN):  HYDROmorphone   Tablet 4 milliGRAM(s) Oral every 4 hours PRN Moderate- Severe Pain (4 - 10)  HYDROmorphone  Injectable 1 milliGRAM(s) IV Push every 4 hours PRN Severe Breakthrough Pain (7 - 10)  naloxone Injectable 0.1 milliGRAM(s) IV Push every 3 minutes PRN For ANY of the following changes in patient status:  A. RR LESS THAN 10 breaths per minute, B. Oxygen saturation LESS THAN 90%, C. Sedation score of 6  naloxone Injectable 0.1 milliGRAM(s) IV Push every 3 minutes PRN For ANY of the following changes in patient status:  A. RR LESS THAN 10 breaths per minute, B. Oxygen saturation LESS THAN 90%, C. Sedation score of 6  ondansetron Injectable 4 milliGRAM(s) IV Push every 6 hours PRN Nausea  ondansetron Injectable 4 milliGRAM(s) IV Push every 6 hours PRN Nausea      OBJECTIVE: Patient laying in bed.    Sedation Score:	[ X] Alert	[ ] Drowsy 	[ ] Arousable	[ ] Asleep	[ ] Unresponsive    Side Effects:	[X ] None	[ ] Nausea	[ ] Vomiting	[ ] Pruritus  		[ ] Other:    Vital Signs Last 24 Hrs  T(C): 36.5 (25 Aug 2022 09:20), Max: 37.3 (25 Aug 2022 06:00)  T(F): 97.7 (25 Aug 2022 09:20), Max: 99.1 (25 Aug 2022 06:00)  HR: 99 (25 Aug 2022 09:20) (88 - 129)  BP: 118/80 (25 Aug 2022 09:20) (117/75 - 138/91)  BP(mean): 92 (25 Aug 2022 02:00) (86 - 103)  RR: 18 (25 Aug 2022 09:20) (12 - 20)  SpO2: 99% (25 Aug 2022 09:20) (97% - 100%)    Parameters below as of 25 Aug 2022 09:20  Patient On (Oxygen Delivery Method): room air        ASSESSMENT/ PLAN    Therapy to  be:	[ ] Continue   [ X] Discontinued   [X ] Change to prn Analgesics    Documentation and Verification of current medications:   [X] Done	[ ] Not done, not elligible    Comments: After reviewing IStop, patient has multiple prescriptions for opioids which does show previous prescription for MSContin 100mg TID last dispensed 05/18/2022 and PO Dilaudid 8mg Q4H. Discussed patient with Anesthesia pain attending. Discussed patient with primary team, IV PCA discontinued and recommended transfer to Palliative care for cancer pain management. Ordered MSContin 60mg Q8H, PO Dilaudid 4mg Q4H PRN moderate to severe pain, and Dilaudid 1mg IVP Q4H PRN severe breakthrough pain. PRN Oral/IV opioids and/or Adjuvant non-opioid medication to be ordered at this point.    [x] [place]  Reviewed  Zolpidem tartrate 10mg tablet. Quantity 30 tablets for 30 days. Last dispensed 07/29/2022.  Alprazolam 1mg tablet. Quantity 120 tablets for 30 days. Last dispensed 07/29/2022.  Morphine sulfate ER 30mg tablet. Quantity 84 tablets for 14 days. Last dispensed 06/03/2022.  Hydromorphone 2mg tablet. Quantity 30 tablets for 3 days. Last dispensed 06/03/2022.  Hydromorphone 8mg tablet. Quantity 84 tablets for 14 days. Last dispensed 05/18/2022.  Morphine sulfate ER 100mg tablet. Quantity 90 tablets for 30 days. Last dispensed 05/18/2022.  Hydromorphone 4mg tablet. Quantity 42 tablets for 7 days. Last dispensed 04/02/2022.  Morphine sulfate ER 60mg tablet. Quantity 90 tablets for 30 days. Last dispensed 03/09/2022.  Oxycodone hcl IR 30mg tablet. Quantity 90 tablets for 15 days. Last dispensed 02/10/2022.  Morphine sulfate ER 15mg tablet. Quantity 90 tablets for 30 days. Last dispensed 01/31/2022.  Oxycodone hcl IR 20mg tablet. Quantity 90 tablets for 15 days. Last dispensed 01/24/2022.  Fentanyl 25mcg/hr patch. Quantity 5 patches for 15 days. Last dispensed 09/02/2021.  Oxycodone hcl IR 10mg tablet. Quantity 84 tablets for 21 days. Last dispensed 08/31/2021.  OxyContin ER 10mg tablet. Quantity 30 tablets for 15 days. Last dispensed 08/31/2021.    Progress Note written now but Patient was seen earlier.

## 2022-08-26 LAB
ANION GAP SERPL CALC-SCNC: 8 MMOL/L — SIGNIFICANT CHANGE UP (ref 7–14)
BUN SERPL-MCNC: 5 MG/DL — LOW (ref 7–23)
CALCIUM SERPL-MCNC: 8.7 MG/DL — SIGNIFICANT CHANGE UP (ref 8.4–10.5)
CHLORIDE SERPL-SCNC: 101 MMOL/L — SIGNIFICANT CHANGE UP (ref 98–107)
CO2 SERPL-SCNC: 29 MMOL/L — SIGNIFICANT CHANGE UP (ref 22–31)
CREAT SERPL-MCNC: 0.7 MG/DL — SIGNIFICANT CHANGE UP (ref 0.5–1.3)
EGFR: 108 ML/MIN/1.73M2 — SIGNIFICANT CHANGE UP
GLUCOSE SERPL-MCNC: 129 MG/DL — HIGH (ref 70–99)
HCT VFR BLD CALC: 31 % — LOW (ref 39–50)
HGB BLD-MCNC: 9.8 G/DL — LOW (ref 13–17)
MAGNESIUM SERPL-MCNC: 2 MG/DL — SIGNIFICANT CHANGE UP (ref 1.6–2.6)
MCHC RBC-ENTMCNC: 27.8 PG — SIGNIFICANT CHANGE UP (ref 27–34)
MCHC RBC-ENTMCNC: 31.6 GM/DL — LOW (ref 32–36)
MCV RBC AUTO: 87.8 FL — SIGNIFICANT CHANGE UP (ref 80–100)
NRBC # BLD: 0 /100 WBCS — SIGNIFICANT CHANGE UP (ref 0–0)
NRBC # FLD: 0 K/UL — SIGNIFICANT CHANGE UP (ref 0–0)
PHOSPHATE SERPL-MCNC: 2.8 MG/DL — SIGNIFICANT CHANGE UP (ref 2.5–4.5)
PLATELET # BLD AUTO: 426 K/UL — HIGH (ref 150–400)
POTASSIUM SERPL-MCNC: 4.5 MMOL/L — SIGNIFICANT CHANGE UP (ref 3.5–5.3)
POTASSIUM SERPL-SCNC: 4.5 MMOL/L — SIGNIFICANT CHANGE UP (ref 3.5–5.3)
RBC # BLD: 3.53 M/UL — LOW (ref 4.2–5.8)
RBC # FLD: 16.2 % — HIGH (ref 10.3–14.5)
SODIUM SERPL-SCNC: 138 MMOL/L — SIGNIFICANT CHANGE UP (ref 135–145)
WBC # BLD: 16.41 K/UL — HIGH (ref 3.8–10.5)
WBC # FLD AUTO: 16.41 K/UL — HIGH (ref 3.8–10.5)

## 2022-08-26 PROCEDURE — 93010 ELECTROCARDIOGRAM REPORT: CPT

## 2022-08-26 RX ORDER — ACETAMINOPHEN 500 MG
1000 TABLET ORAL EVERY 6 HOURS
Refills: 0 | Status: COMPLETED | OUTPATIENT
Start: 2022-08-26 | End: 2022-08-27

## 2022-08-26 RX ADMIN — HYDROMORPHONE HYDROCHLORIDE 4 MILLIGRAM(S): 2 INJECTION INTRAMUSCULAR; INTRAVENOUS; SUBCUTANEOUS at 16:21

## 2022-08-26 RX ADMIN — PIPERACILLIN AND TAZOBACTAM 25 GRAM(S): 4; .5 INJECTION, POWDER, LYOPHILIZED, FOR SOLUTION INTRAVENOUS at 06:53

## 2022-08-26 RX ADMIN — GABAPENTIN 300 MILLIGRAM(S): 400 CAPSULE ORAL at 13:11

## 2022-08-26 RX ADMIN — Medication 400 MILLIGRAM(S): at 21:30

## 2022-08-26 RX ADMIN — HYDROMORPHONE HYDROCHLORIDE 1 MILLIGRAM(S): 2 INJECTION INTRAMUSCULAR; INTRAVENOUS; SUBCUTANEOUS at 06:00

## 2022-08-26 RX ADMIN — HEPARIN SODIUM 5000 UNIT(S): 5000 INJECTION INTRAVENOUS; SUBCUTANEOUS at 05:26

## 2022-08-26 RX ADMIN — HYDROMORPHONE HYDROCHLORIDE 1 MILLIGRAM(S): 2 INJECTION INTRAMUSCULAR; INTRAVENOUS; SUBCUTANEOUS at 23:05

## 2022-08-26 RX ADMIN — HYDROMORPHONE HYDROCHLORIDE 1 MILLIGRAM(S): 2 INJECTION INTRAMUSCULAR; INTRAVENOUS; SUBCUTANEOUS at 00:03

## 2022-08-26 RX ADMIN — Medication 400 MILLIGRAM(S): at 06:56

## 2022-08-26 RX ADMIN — HYDROMORPHONE HYDROCHLORIDE 4 MILLIGRAM(S): 2 INJECTION INTRAMUSCULAR; INTRAVENOUS; SUBCUTANEOUS at 10:00

## 2022-08-26 RX ADMIN — MORPHINE SULFATE 60 MILLIGRAM(S): 50 CAPSULE, EXTENDED RELEASE ORAL at 21:30

## 2022-08-26 RX ADMIN — MORPHINE SULFATE 60 MILLIGRAM(S): 50 CAPSULE, EXTENDED RELEASE ORAL at 05:21

## 2022-08-26 RX ADMIN — GABAPENTIN 300 MILLIGRAM(S): 400 CAPSULE ORAL at 21:30

## 2022-08-26 RX ADMIN — HYDROMORPHONE HYDROCHLORIDE 1 MILLIGRAM(S): 2 INJECTION INTRAMUSCULAR; INTRAVENOUS; SUBCUTANEOUS at 16:55

## 2022-08-26 RX ADMIN — HEPARIN SODIUM 5000 UNIT(S): 5000 INJECTION INTRAVENOUS; SUBCUTANEOUS at 19:40

## 2022-08-26 RX ADMIN — HYDROMORPHONE HYDROCHLORIDE 4 MILLIGRAM(S): 2 INJECTION INTRAMUSCULAR; INTRAVENOUS; SUBCUTANEOUS at 15:22

## 2022-08-26 RX ADMIN — GABAPENTIN 300 MILLIGRAM(S): 400 CAPSULE ORAL at 05:26

## 2022-08-26 RX ADMIN — Medication 400 MILLIGRAM(S): at 13:11

## 2022-08-26 RX ADMIN — Medication 400 MILLIGRAM(S): at 00:07

## 2022-08-26 RX ADMIN — HYDROMORPHONE HYDROCHLORIDE 1 MILLIGRAM(S): 2 INJECTION INTRAMUSCULAR; INTRAVENOUS; SUBCUTANEOUS at 23:25

## 2022-08-26 RX ADMIN — MORPHINE SULFATE 60 MILLIGRAM(S): 50 CAPSULE, EXTENDED RELEASE ORAL at 13:10

## 2022-08-26 RX ADMIN — HYDROMORPHONE HYDROCHLORIDE 4 MILLIGRAM(S): 2 INJECTION INTRAMUSCULAR; INTRAVENOUS; SUBCUTANEOUS at 09:08

## 2022-08-26 RX ADMIN — PIPERACILLIN AND TAZOBACTAM 25 GRAM(S): 4; .5 INJECTION, POWDER, LYOPHILIZED, FOR SOLUTION INTRAVENOUS at 23:05

## 2022-08-26 RX ADMIN — PIPERACILLIN AND TAZOBACTAM 25 GRAM(S): 4; .5 INJECTION, POWDER, LYOPHILIZED, FOR SOLUTION INTRAVENOUS at 15:23

## 2022-08-26 RX ADMIN — HYDROMORPHONE HYDROCHLORIDE 4 MILLIGRAM(S): 2 INJECTION INTRAMUSCULAR; INTRAVENOUS; SUBCUTANEOUS at 19:39

## 2022-08-26 RX ADMIN — HYDROMORPHONE HYDROCHLORIDE 1 MILLIGRAM(S): 2 INJECTION INTRAMUSCULAR; INTRAVENOUS; SUBCUTANEOUS at 05:27

## 2022-08-26 RX ADMIN — HYDROMORPHONE HYDROCHLORIDE 1 MILLIGRAM(S): 2 INJECTION INTRAMUSCULAR; INTRAVENOUS; SUBCUTANEOUS at 16:40

## 2022-08-26 RX ADMIN — Medication 63.75 MILLIMOLE(S): at 15:22

## 2022-08-26 NOTE — PHYSICAL THERAPY INITIAL EVALUATION ADULT - GENERAL OBSERVATIONS, REHAB EVAL
Pt encountered in semisupine position, no distress, AxOX4, with +IV, +davis, and abduction pillow. Pt agreeable to participate in PT evaluation.

## 2022-08-26 NOTE — PROGRESS NOTE ADULT - SUBJECTIVE AND OBJECTIVE BOX
Plastic Surgery Progress Note (pg [facility]: [id number], [facility]: [phone])    SUBJECTIVE  The patient was seen and examined.     OBJECTIVE  ___________________________________________________  VITAL SIGNS / I&O's   Vital Signs Last 24 Hrs  T(C): 37.6 (26 Aug 2022 05:38), Max: 37.6 (26 Aug 2022 05:38)  T(F): 99.6 (26 Aug 2022 05:38), Max: 99.6 (26 Aug 2022 05:38)  HR: 110 (26 Aug 2022 05:38) (99 - 111)  BP: 124/80 (26 Aug 2022 05:38) (116/65 - 135/84)  BP(mean): --  RR: 18 (26 Aug 2022 05:38) (18 - 20)  SpO2: 96% (26 Aug 2022 05:38) (96% - 99%)    Parameters below as of 26 Aug 2022 05:38  Patient On (Oxygen Delivery Method): room air          25 Aug 2022 07:01  -  26 Aug 2022 07:00  --------------------------------------------------------  IN:  Total IN: 0 mL    OUT:    Bulb (mL): 70 mL    Bulb (mL): 15 mL    Indwelling Catheter - Urethral (mL): 3550 mL  Total OUT: 3635 mL    Total NET: -3635 mL        ___________________________________________________  PHYSICAL EXAM    -- CONSTITUTIONAL: NAD, lying in bed  -- NEURO: Awake, alert  -- PULM: Non-labored respirations  -- ABDOMEN: incision is c/d/i, no erythema, JPx2 to suction s/s output,   -- PERINEUM: flap warm, incisions intact, good color and cap refill, healthy appearing, area of darker skin on posterior surface from prior tattoo, dressing c/d/i      ___________________________________________________  LABS                        9.8    16.41 )-----------( [id number]      ( 26 Aug 2022 05:38 )             31.0     26 Aug 2022 05:38    138    |  101    |  5      ----------------------------<  129    4.5     |  29     |  0.70     Ca    8.7        26 Aug 2022 05:38  Phos  2.8       26 Aug 2022 05:38  Mg     2.00      26 Aug 2022 05:38      PT/INR - ( 24 Aug 2022 14:39 )   PT: 16.3 sec;   INR: 1.40 ratio         PTT - ( 24 Aug 2022 14:39 )  PTT:24.8 sec  CAPILLARY BLOOD GLUCOSE              ___________________________________________________  MEDICATIONS  (STANDING):  chlorhexidine 2% Cloths 1 Application(s) Topical daily  gabapentin 300 milliGRAM(s) Oral three times a day  heparin   Injectable 5000 Unit(s) SubCutaneous every 12 hours  lactated ringers. 1000 milliLiter(s) (100 mL/Hr) IV Continuous <Continuous>  morphine ER Tablet 60 milliGRAM(s) Oral every 8 hours  piperacillin/tazobactam IVPB.. 3.375 Gram(s) IV Intermittent every 8 hours    MEDICATIONS  (PRN):  HYDROmorphone   Tablet 4 milliGRAM(s) Oral every 4 hours PRN Moderate- Severe Pain (4 - 10)  HYDROmorphone  Injectable 1 milliGRAM(s) IV Push every 4 hours PRN Severe Breakthrough Pain (7 - 10)  naloxone Injectable 0.1 milliGRAM(s) IV Push every 3 minutes PRN For ANY of the following changes in patient status:  A. RR LESS THAN 10 breaths per minute, B. Oxygen saturation LESS THAN 90%, C. Sedation score of 6  naloxone Injectable 0.1 milliGRAM(s) IV Push every 3 minutes PRN For ANY of the following changes in patient status:  A. RR LESS THAN 10 breaths per minute, B. Oxygen saturation LESS THAN 90%, C. Sedation score of 6  ondansetron Injectable 4 milliGRAM(s) IV Push every 6 hours PRN Nausea  ondansetron Injectable 4 milliGRAM(s) IV Push every 6 hours PRN Nausea          Assessment & Plan   57y Male with Hx rectal/prostate cancer s/p radiation and colostomy, lower GIB, now s/p APR with end colostomy creation, VRAM flap closure on 8/24    - flap healthy-appearing  - bacitracin, xeroform, abd pads over flap  - JPx2 suction, drain care  - NO SITTING- patient may lay on back or stand  - assistance with ambulating and moving from laying to standing  - continue excellent care per primary team        Shaye Funez  Plastic & Reconstructive Surgery, PGY-1  #[id number]

## 2022-08-26 NOTE — PHYSICAL THERAPY INITIAL EVALUATION ADULT - DID THE PATIENT HAVE SURGERY?
s/p Lower midline laparotomy incision,  The perineal portion of the procedure commenced and the anus and rectum were dissected free from the ischiorectal fossa, The old colostomy was taken down, approximately 6 cm excised, and a new end colostomy was fashioned in Breanna manner./yes

## 2022-08-26 NOTE — PROVIDER CONTACT NOTE (OTHER) - ASSESSMENT
AOX4, resting in bed, no c/o chest pain, palpitation, dizziness or SOB. No acute distress noted. c/o pain at surgical site. IV Diluadid given for pain.

## 2022-08-26 NOTE — PHYSICAL THERAPY INITIAL EVALUATION ADULT - NSPTDISCHREC_GEN_A_CORE
Home; no skilled PT required; will keep pt on PT Program while @ [facility] to prevent weakness/deconditioning.

## 2022-08-26 NOTE — PHYSICAL THERAPY INITIAL EVALUATION ADULT - PERTINENT HX OF CURRENT PROBLEM, REHAB EVAL
57 year old male with Hx rectal/prostate cancer s/p radiation, hx of sigmoid colostomy in March with Dr. Avilez, s/p hospitalization and SICU admission for lower GIB, discharged 8/19, now presenting for scheduled APR.

## 2022-08-26 NOTE — PROVIDER CONTACT NOTE (OTHER) - ASSESSMENT
Pt. A&OX4; resting comfortably in bed; laying flat. Denies any chest pain, palpiations, SOB or any dizziness.  B/P: 114/84  HR: 115  RR: 18  O2: 97% on RA  Temp.: 98.4 Pt. A&OX4; resting comfortably in bed; laying flat. Denies any chest pain, palpitations, SOB or any dizziness.  B/P: 114/84  HR: 115  RR: 18  O2: 97% on RA  Temp.: 98.4 Pt. A&OX4; resting comfortably in bed; laying flat. Denies any chest pain, palpitations, SOB or any dizziness.  B/P: 114/84  HR: 115  RR: 18  O2 sat: 97% on RA  Temp.: 98.4  RR: 18  O2: 97% on RA

## 2022-08-26 NOTE — PHYSICAL THERAPY INITIAL EVALUATION ADULT - PATIENT PROFILE REVIEW, REHAB EVAL
ACTIVITY: Ambulate with Assistance- CANNOT SIT; please assist from moving from lying to standing to minimize time patient is sitting on graft during transition from lying to standing and back to lying on back; Spoke with RN Megan Pineda prior to PT evaluation--> Pt OK for PT consult/OOB activity./yes

## 2022-08-26 NOTE — PHYSICAL THERAPY INITIAL EVALUATION ADULT - BALANCE DISTURBANCE, IDENTIFIED IMPAIRMENT CONTRIBUTE, REHAB EVAL
Attempted to reach the group home/Ms. Arzola/Jenny and received VM.  Left message to call back.     decreased strength

## 2022-08-26 NOTE — PROGRESS NOTE ADULT - SUBJECTIVE AND OBJECTIVE BOX
Surgery Progress Note    SUBJECTIVE:   Pt seen and examined at bedside. Patient comfortable and in no-apparent distress. No nausea, vomiting. Pain is better controlled than yesterday. Tolerating CLD. Patient would like to work with PT today for OOB to standing per PRS recommendations.    OBJECTIVE:  Vital Signs Last 24 Hrs  T(C): 37.6 (26 Aug 2022 05:38), Max: 37.6 (26 Aug 2022 05:38)  T(F): 99.6 (26 Aug 2022 05:38), Max: 99.6 (26 Aug 2022 05:38)  HR: 110 (26 Aug 2022 05:38) (99 - 111)  BP: 124/80 (26 Aug 2022 05:38) (116/65 - 135/84)  BP(mean): --  RR: 18 (26 Aug 2022 05:38) (18 - 20)  SpO2: 96% (26 Aug 2022 05:38) (96% - 99%)    Parameters below as of 26 Aug 2022 05:38  Patient On (Oxygen Delivery Method): room air    Physical Exam:  General Appearance: Appears well, NAD  Respiratory: No labored breathing  CV: Pulse regularly present  Abdomen: Soft, appropriately tender to palpation around the incision, Incision c/d/i, uncovered, Ostomy pink with no output this AM    LABS:                        9.8    16.41 )-----------( 426      ( 26 Aug 2022 05:38 )             31.0     08-26    138  |  101  |  5<L>  ----------------------------<  129<H>  4.5   |  29  |  0.70    Ca    8.7      26 Aug 2022 05:38  Phos  2.8     08-26  Mg     2.00     08-26      PT/INR - ( 24 Aug 2022 14:39 )   PT: 16.3 sec;   INR: 1.40 ratio    PTT - ( 24 Aug 2022 14:39 )  PTT:24.8 sec      RADIOLOGY & ADDITIONAL STUDIES:      INs and OUTs:    08-25-22 @ 07:01  -  08-26-22 @ 07:00  --------------------------------------------------------  IN: 0 mL / OUT: 3635 mL / NET: -3635 mL        MEDICATIONS  (STANDING):  chlorhexidine 2% Cloths 1 Application(s) Topical daily  gabapentin 300 milliGRAM(s) Oral three times a day  heparin   Injectable 5000 Unit(s) SubCutaneous every 12 hours  lactated ringers. 1000 milliLiter(s) (100 mL/Hr) IV Continuous <Continuous>  morphine ER Tablet 60 milliGRAM(s) Oral every 8 hours  piperacillin/tazobactam IVPB.. 3.375 Gram(s) IV Intermittent every 8 hours    MEDICATIONS  (PRN):  HYDROmorphone   Tablet 4 milliGRAM(s) Oral every 4 hours PRN Moderate- Severe Pain (4 - 10)  HYDROmorphone  Injectable 1 milliGRAM(s) IV Push every 4 hours PRN Severe Breakthrough Pain (7 - 10)  naloxone Injectable 0.1 milliGRAM(s) IV Push every 3 minutes PRN For ANY of the following changes in patient status:  A. RR LESS THAN 10 breaths per minute, B. Oxygen saturation LESS THAN 90%, C. Sedation score of 6  naloxone Injectable 0.1 milliGRAM(s) IV Push every 3 minutes PRN For ANY of the following changes in patient status:  A. RR LESS THAN 10 breaths per minute, B. Oxygen saturation LESS THAN 90%, C. Sedation score of 6  ondansetron Injectable 4 milliGRAM(s) IV Push every 6 hours PRN Nausea  ondansetron Injectable 4 milliGRAM(s) IV Push every 6 hours PRN Nausea

## 2022-08-26 NOTE — PHYSICAL THERAPY INITIAL EVALUATION ADULT - ADDITIONAL COMMENTS
Pt lives in an apartment with his wife with no steps to negotiate; bedroom is on the ground floor. Prior to hospital admission pt was completely independent and used a single axis cane with ambulation. Pt's wife assist him with his ADL's. He denies any recent falls.    Pt left comfortable in bed, NAD, all lines intact, all precautions maintained, with call bell in reach, and RN aware.

## 2022-08-26 NOTE — PROGRESS NOTE ADULT - ASSESSMENT
A/P: 57y Male with Hx rectal/prostate cancer s/p radiation, hx of sigmoid colostomy in March with Dr. Avilez, s/p hospitalization and SICU admission for lower GIB, discharged 8/19, now s/p APR with end colostomy creation, VRAM flap closure.    Plan:  - Work with PT for OOB WITHOUT sitting per PRS recommendations  - Diet: LRD for lunch  - await GI function   - Strict I&O's  - Monitor TALI drain output  - LR 100ml/hr  - continue Sahu due to low anastamosis   - Zosyn  - DVT prophylaxis w/ SQDH.

## 2022-08-27 LAB
ANION GAP SERPL CALC-SCNC: 9 MMOL/L — SIGNIFICANT CHANGE UP (ref 7–14)
ANION GAP SERPL CALC-SCNC: 9 MMOL/L — SIGNIFICANT CHANGE UP (ref 7–14)
APPEARANCE UR: CLEAR — SIGNIFICANT CHANGE UP
BILIRUB UR-MCNC: NEGATIVE — SIGNIFICANT CHANGE UP
BUN SERPL-MCNC: 5 MG/DL — LOW (ref 7–23)
BUN SERPL-MCNC: 6 MG/DL — LOW (ref 7–23)
CALCIUM SERPL-MCNC: 8.2 MG/DL — LOW (ref 8.4–10.5)
CALCIUM SERPL-MCNC: 8.5 MG/DL — SIGNIFICANT CHANGE UP (ref 8.4–10.5)
CHLORIDE SERPL-SCNC: 100 MMOL/L — SIGNIFICANT CHANGE UP (ref 98–107)
CHLORIDE SERPL-SCNC: 99 MMOL/L — SIGNIFICANT CHANGE UP (ref 98–107)
CO2 SERPL-SCNC: 26 MMOL/L — SIGNIFICANT CHANGE UP (ref 22–31)
CO2 SERPL-SCNC: 27 MMOL/L — SIGNIFICANT CHANGE UP (ref 22–31)
COLOR SPEC: COLORLESS — SIGNIFICANT CHANGE UP
CREAT SERPL-MCNC: 0.45 MG/DL — LOW (ref 0.5–1.3)
CREAT SERPL-MCNC: 0.5 MG/DL — SIGNIFICANT CHANGE UP (ref 0.5–1.3)
DIFF PNL FLD: ABNORMAL
EGFR: 120 ML/MIN/1.73M2 — SIGNIFICANT CHANGE UP
EGFR: 124 ML/MIN/1.73M2 — SIGNIFICANT CHANGE UP
GLUCOSE SERPL-MCNC: 112 MG/DL — HIGH (ref 70–99)
GLUCOSE SERPL-MCNC: 122 MG/DL — HIGH (ref 70–99)
GLUCOSE UR QL: NEGATIVE — SIGNIFICANT CHANGE UP
HCT VFR BLD CALC: 27.4 % — LOW (ref 39–50)
HCT VFR BLD CALC: 28.4 % — LOW (ref 39–50)
HGB BLD-MCNC: 8.9 G/DL — LOW (ref 13–17)
HGB BLD-MCNC: 9.2 G/DL — LOW (ref 13–17)
KETONES UR-MCNC: NEGATIVE — SIGNIFICANT CHANGE UP
LEUKOCYTE ESTERASE UR-ACNC: NEGATIVE — SIGNIFICANT CHANGE UP
MAGNESIUM SERPL-MCNC: 1.9 MG/DL — SIGNIFICANT CHANGE UP (ref 1.6–2.6)
MAGNESIUM SERPL-MCNC: 1.9 MG/DL — SIGNIFICANT CHANGE UP (ref 1.6–2.6)
MCHC RBC-ENTMCNC: 28.2 PG — SIGNIFICANT CHANGE UP (ref 27–34)
MCHC RBC-ENTMCNC: 28.7 PG — SIGNIFICANT CHANGE UP (ref 27–34)
MCHC RBC-ENTMCNC: 32.4 GM/DL — SIGNIFICANT CHANGE UP (ref 32–36)
MCHC RBC-ENTMCNC: 32.5 GM/DL — SIGNIFICANT CHANGE UP (ref 32–36)
MCV RBC AUTO: 86.7 FL — SIGNIFICANT CHANGE UP (ref 80–100)
MCV RBC AUTO: 88.5 FL — SIGNIFICANT CHANGE UP (ref 80–100)
NITRITE UR-MCNC: NEGATIVE — SIGNIFICANT CHANGE UP
NRBC # BLD: 0 /100 WBCS — SIGNIFICANT CHANGE UP (ref 0–0)
NRBC # BLD: 0 /100 WBCS — SIGNIFICANT CHANGE UP (ref 0–0)
NRBC # FLD: 0 K/UL — SIGNIFICANT CHANGE UP (ref 0–0)
NRBC # FLD: 0 K/UL — SIGNIFICANT CHANGE UP (ref 0–0)
PH UR: 7.5 — SIGNIFICANT CHANGE UP (ref 5–8)
PHOSPHATE SERPL-MCNC: 2.1 MG/DL — LOW (ref 2.5–4.5)
PHOSPHATE SERPL-MCNC: 2.7 MG/DL — SIGNIFICANT CHANGE UP (ref 2.5–4.5)
PLATELET # BLD AUTO: 384 K/UL — SIGNIFICANT CHANGE UP (ref 150–400)
PLATELET # BLD AUTO: 395 K/UL — SIGNIFICANT CHANGE UP (ref 150–400)
POTASSIUM SERPL-MCNC: 3.3 MMOL/L — LOW (ref 3.5–5.3)
POTASSIUM SERPL-MCNC: 3.6 MMOL/L — SIGNIFICANT CHANGE UP (ref 3.5–5.3)
POTASSIUM SERPL-SCNC: 3.3 MMOL/L — LOW (ref 3.5–5.3)
POTASSIUM SERPL-SCNC: 3.6 MMOL/L — SIGNIFICANT CHANGE UP (ref 3.5–5.3)
PROT UR-MCNC: ABNORMAL
RBC # BLD: 3.16 M/UL — LOW (ref 4.2–5.8)
RBC # BLD: 3.21 M/UL — LOW (ref 4.2–5.8)
RBC # FLD: 15.8 % — HIGH (ref 10.3–14.5)
RBC # FLD: 16 % — HIGH (ref 10.3–14.5)
SODIUM SERPL-SCNC: 135 MMOL/L — SIGNIFICANT CHANGE UP (ref 135–145)
SODIUM SERPL-SCNC: 135 MMOL/L — SIGNIFICANT CHANGE UP (ref 135–145)
SP GR SPEC: 1.01 — SIGNIFICANT CHANGE UP (ref 1.01–1.05)
UROBILINOGEN FLD QL: SIGNIFICANT CHANGE UP
WBC # BLD: 16.28 K/UL — HIGH (ref 3.8–10.5)
WBC # BLD: 18.22 K/UL — HIGH (ref 3.8–10.5)
WBC # FLD AUTO: 16.28 K/UL — HIGH (ref 3.8–10.5)
WBC # FLD AUTO: 18.22 K/UL — HIGH (ref 3.8–10.5)

## 2022-08-27 PROCEDURE — 71045 X-RAY EXAM CHEST 1 VIEW: CPT | Mod: 26

## 2022-08-27 RX ORDER — BICTEGRAVIR SODIUM, EMTRICITABINE, AND TENOFOVIR ALAFENAMIDE FUMARATE 30; 120; 15 MG/1; MG/1; MG/1
1 TABLET ORAL DAILY
Refills: 0 | Status: DISCONTINUED | OUTPATIENT
Start: 2022-08-27 | End: 2022-09-02

## 2022-08-27 RX ORDER — GABAPENTIN 400 MG/1
600 CAPSULE ORAL
Refills: 0 | Status: DISCONTINUED | OUTPATIENT
Start: 2022-08-27 | End: 2022-09-02

## 2022-08-27 RX ORDER — METOPROLOL TARTRATE 50 MG
50 TABLET ORAL DAILY
Refills: 0 | Status: DISCONTINUED | OUTPATIENT
Start: 2022-08-27 | End: 2022-09-02

## 2022-08-27 RX ORDER — AMLODIPINE BESYLATE 2.5 MG/1
5 TABLET ORAL DAILY
Refills: 0 | Status: DISCONTINUED | OUTPATIENT
Start: 2022-08-27 | End: 2022-09-02

## 2022-08-27 RX ORDER — GABAPENTIN 400 MG/1
300 CAPSULE ORAL
Refills: 0 | Status: DISCONTINUED | OUTPATIENT
Start: 2022-08-27 | End: 2022-09-02

## 2022-08-27 RX ORDER — METOPROLOL TARTRATE 50 MG
50 TABLET ORAL DAILY
Refills: 0 | Status: DISCONTINUED | OUTPATIENT
Start: 2022-08-27 | End: 2022-08-27

## 2022-08-27 RX ORDER — AMLODIPINE BESYLATE 2.5 MG/1
5 TABLET ORAL DAILY
Refills: 0 | Status: DISCONTINUED | OUTPATIENT
Start: 2022-08-27 | End: 2022-08-27

## 2022-08-27 RX ORDER — POTASSIUM CHLORIDE 20 MEQ
20 PACKET (EA) ORAL
Refills: 0 | Status: COMPLETED | OUTPATIENT
Start: 2022-08-27 | End: 2022-08-27

## 2022-08-27 RX ADMIN — GABAPENTIN 600 MILLIGRAM(S): 400 CAPSULE ORAL at 17:43

## 2022-08-27 RX ADMIN — GABAPENTIN 300 MILLIGRAM(S): 400 CAPSULE ORAL at 06:02

## 2022-08-27 RX ADMIN — BICTEGRAVIR SODIUM, EMTRICITABINE, AND TENOFOVIR ALAFENAMIDE FUMARATE 1 TABLET(S): 30; 120; 15 TABLET ORAL at 15:04

## 2022-08-27 RX ADMIN — HYDROMORPHONE HYDROCHLORIDE 4 MILLIGRAM(S): 2 INJECTION INTRAMUSCULAR; INTRAVENOUS; SUBCUTANEOUS at 12:36

## 2022-08-27 RX ADMIN — HYDROMORPHONE HYDROCHLORIDE 1 MILLIGRAM(S): 2 INJECTION INTRAMUSCULAR; INTRAVENOUS; SUBCUTANEOUS at 09:35

## 2022-08-27 RX ADMIN — PIPERACILLIN AND TAZOBACTAM 25 GRAM(S): 4; .5 INJECTION, POWDER, LYOPHILIZED, FOR SOLUTION INTRAVENOUS at 07:56

## 2022-08-27 RX ADMIN — Medication 400 MILLIGRAM(S): at 03:22

## 2022-08-27 RX ADMIN — HEPARIN SODIUM 5000 UNIT(S): 5000 INJECTION INTRAVENOUS; SUBCUTANEOUS at 06:02

## 2022-08-27 RX ADMIN — MORPHINE SULFATE 60 MILLIGRAM(S): 50 CAPSULE, EXTENDED RELEASE ORAL at 23:07

## 2022-08-27 RX ADMIN — HYDROMORPHONE HYDROCHLORIDE 4 MILLIGRAM(S): 2 INJECTION INTRAMUSCULAR; INTRAVENOUS; SUBCUTANEOUS at 17:44

## 2022-08-27 RX ADMIN — GABAPENTIN 300 MILLIGRAM(S): 400 CAPSULE ORAL at 23:08

## 2022-08-27 RX ADMIN — HYDROMORPHONE HYDROCHLORIDE 4 MILLIGRAM(S): 2 INJECTION INTRAMUSCULAR; INTRAVENOUS; SUBCUTANEOUS at 02:30

## 2022-08-27 RX ADMIN — MORPHINE SULFATE 60 MILLIGRAM(S): 50 CAPSULE, EXTENDED RELEASE ORAL at 05:57

## 2022-08-27 RX ADMIN — HEPARIN SODIUM 5000 UNIT(S): 5000 INJECTION INTRAVENOUS; SUBCUTANEOUS at 17:43

## 2022-08-27 RX ADMIN — HYDROMORPHONE HYDROCHLORIDE 4 MILLIGRAM(S): 2 INJECTION INTRAMUSCULAR; INTRAVENOUS; SUBCUTANEOUS at 18:25

## 2022-08-27 RX ADMIN — Medication 400 MILLIGRAM(S): at 09:15

## 2022-08-27 RX ADMIN — Medication 20 MILLIEQUIVALENT(S): at 07:57

## 2022-08-27 RX ADMIN — MORPHINE SULFATE 60 MILLIGRAM(S): 50 CAPSULE, EXTENDED RELEASE ORAL at 15:10

## 2022-08-27 RX ADMIN — HYDROMORPHONE HYDROCHLORIDE 4 MILLIGRAM(S): 2 INJECTION INTRAMUSCULAR; INTRAVENOUS; SUBCUTANEOUS at 13:30

## 2022-08-27 RX ADMIN — HYDROMORPHONE HYDROCHLORIDE 4 MILLIGRAM(S): 2 INJECTION INTRAMUSCULAR; INTRAVENOUS; SUBCUTANEOUS at 01:33

## 2022-08-27 RX ADMIN — Medication 85 MILLIMOLE(S): at 12:36

## 2022-08-27 RX ADMIN — HYDROMORPHONE HYDROCHLORIDE 1 MILLIGRAM(S): 2 INJECTION INTRAMUSCULAR; INTRAVENOUS; SUBCUTANEOUS at 09:14

## 2022-08-27 RX ADMIN — HYDROMORPHONE HYDROCHLORIDE 1 MILLIGRAM(S): 2 INJECTION INTRAMUSCULAR; INTRAVENOUS; SUBCUTANEOUS at 15:04

## 2022-08-27 RX ADMIN — PIPERACILLIN AND TAZOBACTAM 25 GRAM(S): 4; .5 INJECTION, POWDER, LYOPHILIZED, FOR SOLUTION INTRAVENOUS at 15:09

## 2022-08-27 RX ADMIN — Medication 20 MILLIEQUIVALENT(S): at 05:57

## 2022-08-27 RX ADMIN — PIPERACILLIN AND TAZOBACTAM 25 GRAM(S): 4; .5 INJECTION, POWDER, LYOPHILIZED, FOR SOLUTION INTRAVENOUS at 23:08

## 2022-08-27 RX ADMIN — HYDROMORPHONE HYDROCHLORIDE 1 MILLIGRAM(S): 2 INJECTION INTRAMUSCULAR; INTRAVENOUS; SUBCUTANEOUS at 15:30

## 2022-08-27 NOTE — PROGRESS NOTE ADULT - ASSESSMENT
A/P: 57y Male with Hx rectal/prostate cancer s/p radiation, hx of sigmoid colostomy in March with Dr. Avilez, s/p hospitalization and SICU admission for lower GIB, discharged 8/19, now s/p APR with end colostomy creation, VRAM flap closure on 8/24.    Plan:  - Work with PT for OOB WITHOUT sitting per PRS recommendations  - Diet: LRD for lunch  - +BM, + Flatus  - Strict I&O's  - Monitor TALI drain output  - LR 100ml/hr  - continue Sahu due to low anastamosis   - Zosyn  - Restarting home BP and HIV medications.  - DVT prophylaxis w/ SQDH.

## 2022-08-27 NOTE — PROGRESS NOTE ADULT - SUBJECTIVE AND OBJECTIVE BOX
GENERAL SURGERY PROGRESS NOTE    SUBJECTIVE  Reports feeling better this morning but was unable to sleep well. +/+, denies nausea or vomiting. Sahu is in place, seen by PT yesterday.    OVERNIGHT EVENTS:  Was febrile to 100.9 that self resolved 99.1 on repeat temp  Tachycardia to 132; remains Tachy 125. Currently on Tylenol, Gabapentin, Dilauded for pain; LR - 100  Sepsis work up: U/A was negative, CXR, BCx x2,     10-point review of systems completed and negative except as noted above.      OBJECTIVE    MEDICATIONS  chlorhexidine 2% Cloths 1 Application(s) Topical daily  gabapentin 300 milliGRAM(s) Oral three times a day  heparin   Injectable 5000 Unit(s) SubCutaneous every 12 hours  HYDROmorphone   Tablet 4 milliGRAM(s) Oral every 4 hours PRN  HYDROmorphone  Injectable 1 milliGRAM(s) IV Push every 4 hours PRN  morphine ER Tablet 60 milliGRAM(s) Oral every 8 hours  naloxone Injectable 0.1 milliGRAM(s) IV Push every 3 minutes PRN  naloxone Injectable 0.1 milliGRAM(s) IV Push every 3 minutes PRN  ondansetron Injectable 4 milliGRAM(s) IV Push every 6 hours PRN  ondansetron Injectable 4 milliGRAM(s) IV Push every 6 hours PRN  piperacillin/tazobactam IVPB.. 3.375 Gram(s) IV Intermittent every 8 hours  sodium phosphate IVPB 30 milliMole(s) IV Intermittent once      PHYSICAL EXAM  T(C): 37.5 (22 @ 10:34), Max: 38.3 (22 @ 22:05)  HR: 126 (22 @ 10:34) (109 - 132)  BP: 118/83 (22 @ 10:34) (115/79 - 136/79)  RR: 18 (22 @ 10:34) (17 - 19)  SpO2: 98% (22 @ 10:34) (98% - 99%)    22 @ 07:01  -  22 @ 07:00  --------------------------------------------------------  IN: 0 mL / OUT: 2818 mL / NET: -2818 mL    22 @ 07:01  -  22 @ 11:43  --------------------------------------------------------  IN: 360 mL / OUT: 600 mL / NET: -240 mL        Physical Exam:  General Appearance: Appears well, NAD  Respiratory: No labored breathing  CV: Pulse regularly present  Abdomen: Soft, appropriately tender to palpation around the incision, Incision c/d/i, uncovered, Ostomy pink with no output this AM    LABS                        8.9    . )-----------( 395      ( 27 Aug 2022 07:32 )             27.4     08    135  |  100  |  5<L>  ----------------------------<  112<H>  3.6   |  26  |  0.45<L>    Ca    8.5      27 Aug 2022 07:32  Phos  2.1       Mg     1.90             Urinalysis Basic - ( 27 Aug 2022 03:30 )    Color: Colorless / Appearance: Clear / S.010 / pH: x  Gluc: x / Ketone: Negative  / Bili: Negative / Urobili: <2 mg/dL   Blood: x / Protein: Trace / Nitrite: Negative   Leuk Esterase: Negative / RBC: 35 /HPF / WBC 2 /HPF   Sq Epi: x / Non Sq Epi: 2 /HPF / Bacteria: Negative        RADIOLOGY & ADDITIONAL STUDIES

## 2022-08-27 NOTE — PROGRESS NOTE ADULT - ASSESSMENT
57y Male with Hx rectal/prostate cancer s/p radiation and colostomy, lower GIB, now s/p APR with end colostomy creation, VRAM flap closure on 8/24    - flap healthy-appearing  - bacitracin, xeroform, abd pads over flap  - JPx2 suction, drain care  - NO SITTING- patient may lay on back or stand  - assistance with ambulating and moving from laying to standing  - f/u fever workup  - please re start home meds    PRS  [id number]

## 2022-08-27 NOTE — PROGRESS NOTE ADULT - SUBJECTIVE AND OBJECTIVE BOX
Plastic Surgery Progress Note (pg [facility]: [id number], [facility]: [phone])    SUBJECTIVE  The patient was seen and examined. pt with fever, tachycardiac     OBJECTIVE  ___________________________________________________  VITAL SIGNS / I&O's   Vital Signs Last 24 Hrs  T(C): 37.4 (27 Aug 2022 05:58), Max: 38.3 (26 Aug 2022 22:05)  T(F): 99.3 (27 Aug 2022 05:58), Max: 100.9 (26 Aug 2022 22:05)  HR: 125 (27 Aug 2022 05:58) (109 - 132)  BP: 136/79 (27 Aug 2022 05:58) (115/79 - 136/79)  BP(mean): --  RR: 19 (27 Aug 2022 05:58) (17 - 19)  SpO2: 98% (27 Aug 2022 05:58) (98% - 99%)    Parameters below as of 27 Aug 2022 05:58  Patient On (Oxygen Delivery Method): room air          26 Aug 2022 07:01  -  27 Aug 2022 07:00  --------------------------------------------------------  IN:  Total IN: 0 mL    OUT:    Bulb (mL): 24 mL    Bulb (mL): 19 mL    Indwelling Catheter - Urethral (mL): 2775 mL  Total OUT: 2818 mL    Total NET: -2818 mL        ___________________________________________________  PHYSICAL EXAM    -- CONSTITUTIONAL: NAD, lying in bed  -- NEURO: Awake, alert  -- PULM: Non-labored respirations  -- ABDOMEN: incision is c/d/i, no erythema, JPx2 to suction s/s output,   -- PERINEUM: flap warm, incisions intact, good color and cap refill, healthy appearing, area of darker skin on posterior surface from prior tattoo, dressing c/d/i    ___________________________________________________  LABS                        8.9    18.22 )-----------( 395      ( 27 Aug 2022 07:32 )             27.4     27 Aug 2022 07:32    135    |  100    |  5      ----------------------------<  112    3.6     |  26     |  0.45     Ca    8.5        27 Aug 2022 07:32  Phos  2.1       27 Aug 2022 07:32  Mg     1.90      27 Aug 2022 07:32        CAPILLARY BLOOD GLUCOSE            Urinalysis Basic - ( 27 Aug 2022 03:30 )    Color: Colorless / Appearance: Clear / S.010 / pH: x  Gluc: x / Ketone: Negative  / Bili: Negative / Urobili: <2 mg/dL   Blood: x / Protein: Trace / Nitrite: Negative   Leuk Esterase: Negative / RBC: 35 /HPF / WBC 2 /HPF   Sq Epi: x / Non Sq Epi: 2 /HPF / Bacteria: Negative      ___________________________________________________  MICRO  Recent Cultures:    ___________________________________________________  MEDICATIONS  (STANDING):  chlorhexidine 2% Cloths 1 Application(s) Topical daily  gabapentin 300 milliGRAM(s) Oral three times a day  heparin   Injectable 5000 Unit(s) SubCutaneous every 12 hours  morphine ER Tablet 60 milliGRAM(s) Oral every 8 hours  piperacillin/tazobactam IVPB.. 3.375 Gram(s) IV Intermittent every 8 hours    MEDICATIONS  (PRN):  HYDROmorphone   Tablet 4 milliGRAM(s) Oral every 4 hours PRN Moderate- Severe Pain (4 - 10)  HYDROmorphone  Injectable 1 milliGRAM(s) IV Push every 4 hours PRN Severe Breakthrough Pain (7 - 10)  naloxone Injectable 0.1 milliGRAM(s) IV Push every 3 minutes PRN For ANY of the following changes in patient status:  A. RR LESS THAN 10 breaths per minute, B. Oxygen saturation LESS THAN 90%, C. Sedation score of 6  naloxone Injectable 0.1 milliGRAM(s) IV Push every 3 minutes PRN For ANY of the following changes in patient status:  A. RR LESS THAN 10 breaths per minute, B. Oxygen saturation LESS THAN 90%, C. Sedation score of 6  ondansetron Injectable 4 milliGRAM(s) IV Push every 6 hours PRN Nausea  ondansetron Injectable 4 milliGRAM(s) IV Push every 6 hours PRN Nausea

## 2022-08-28 LAB
ALBUMIN SERPL ELPH-MCNC: 2.5 G/DL — LOW (ref 3.3–5)
ALP SERPL-CCNC: 76 U/L — SIGNIFICANT CHANGE UP (ref 40–120)
ALT FLD-CCNC: 5 U/L — SIGNIFICANT CHANGE UP (ref 4–41)
ANION GAP SERPL CALC-SCNC: 7 MMOL/L — SIGNIFICANT CHANGE UP (ref 7–14)
AST SERPL-CCNC: 9 U/L — SIGNIFICANT CHANGE UP (ref 4–40)
BILIRUB SERPL-MCNC: 0.2 MG/DL — SIGNIFICANT CHANGE UP (ref 0.2–1.2)
BUN SERPL-MCNC: 5 MG/DL — LOW (ref 7–23)
CALCIUM SERPL-MCNC: 8.6 MG/DL — SIGNIFICANT CHANGE UP (ref 8.4–10.5)
CHLORIDE SERPL-SCNC: 101 MMOL/L — SIGNIFICANT CHANGE UP (ref 98–107)
CO2 SERPL-SCNC: 29 MMOL/L — SIGNIFICANT CHANGE UP (ref 22–31)
CREAT SERPL-MCNC: 0.49 MG/DL — LOW (ref 0.5–1.3)
EGFR: 120 ML/MIN/1.73M2 — SIGNIFICANT CHANGE UP
GLUCOSE SERPL-MCNC: 112 MG/DL — HIGH (ref 70–99)
HCT VFR BLD CALC: 29.8 % — LOW (ref 39–50)
HGB BLD-MCNC: 9.4 G/DL — LOW (ref 13–17)
MAGNESIUM SERPL-MCNC: 2 MG/DL — SIGNIFICANT CHANGE UP (ref 1.6–2.6)
MCHC RBC-ENTMCNC: 27.6 PG — SIGNIFICANT CHANGE UP (ref 27–34)
MCHC RBC-ENTMCNC: 31.5 GM/DL — LOW (ref 32–36)
MCV RBC AUTO: 87.6 FL — SIGNIFICANT CHANGE UP (ref 80–100)
NRBC # BLD: 0 /100 WBCS — SIGNIFICANT CHANGE UP (ref 0–0)
NRBC # FLD: 0 K/UL — SIGNIFICANT CHANGE UP (ref 0–0)
PHOSPHATE SERPL-MCNC: 2.8 MG/DL — SIGNIFICANT CHANGE UP (ref 2.5–4.5)
PLATELET # BLD AUTO: 421 K/UL — HIGH (ref 150–400)
POTASSIUM SERPL-MCNC: 3.6 MMOL/L — SIGNIFICANT CHANGE UP (ref 3.5–5.3)
POTASSIUM SERPL-SCNC: 3.6 MMOL/L — SIGNIFICANT CHANGE UP (ref 3.5–5.3)
PROT SERPL-MCNC: 6.1 G/DL — SIGNIFICANT CHANGE UP (ref 6–8.3)
RBC # BLD: 3.4 M/UL — LOW (ref 4.2–5.8)
RBC # FLD: 15.8 % — HIGH (ref 10.3–14.5)
SODIUM SERPL-SCNC: 137 MMOL/L — SIGNIFICANT CHANGE UP (ref 135–145)
WBC # BLD: 16.59 K/UL — HIGH (ref 3.8–10.5)
WBC # FLD AUTO: 16.59 K/UL — HIGH (ref 3.8–10.5)

## 2022-08-28 PROCEDURE — 93010 ELECTROCARDIOGRAM REPORT: CPT

## 2022-08-28 RX ORDER — HYDROMORPHONE HYDROCHLORIDE 2 MG/ML
8 INJECTION INTRAMUSCULAR; INTRAVENOUS; SUBCUTANEOUS EVERY 6 HOURS
Refills: 0 | Status: DISCONTINUED | OUTPATIENT
Start: 2022-08-28 | End: 2022-09-02

## 2022-08-28 RX ORDER — MORPHINE SULFATE 50 MG/1
100 CAPSULE, EXTENDED RELEASE ORAL EVERY 12 HOURS
Refills: 0 | Status: DISCONTINUED | OUTPATIENT
Start: 2022-08-28 | End: 2022-09-02

## 2022-08-28 RX ORDER — HYDROMORPHONE HYDROCHLORIDE 2 MG/ML
1 INJECTION INTRAMUSCULAR; INTRAVENOUS; SUBCUTANEOUS EVERY 6 HOURS
Refills: 0 | Status: DISCONTINUED | OUTPATIENT
Start: 2022-08-28 | End: 2022-09-02

## 2022-08-28 RX ORDER — LIDOCAINE 4 G/100G
1 CREAM TOPICAL EVERY 24 HOURS
Refills: 0 | Status: COMPLETED | OUTPATIENT
Start: 2022-08-28 | End: 2022-09-01

## 2022-08-28 RX ADMIN — HYDROMORPHONE HYDROCHLORIDE 8 MILLIGRAM(S): 2 INJECTION INTRAMUSCULAR; INTRAVENOUS; SUBCUTANEOUS at 18:10

## 2022-08-28 RX ADMIN — BICTEGRAVIR SODIUM, EMTRICITABINE, AND TENOFOVIR ALAFENAMIDE FUMARATE 1 TABLET(S): 30; 120; 15 TABLET ORAL at 11:45

## 2022-08-28 RX ADMIN — HYDROMORPHONE HYDROCHLORIDE 1 MILLIGRAM(S): 2 INJECTION INTRAMUSCULAR; INTRAVENOUS; SUBCUTANEOUS at 11:55

## 2022-08-28 RX ADMIN — HYDROMORPHONE HYDROCHLORIDE 4 MILLIGRAM(S): 2 INJECTION INTRAMUSCULAR; INTRAVENOUS; SUBCUTANEOUS at 11:55

## 2022-08-28 RX ADMIN — MORPHINE SULFATE 100 MILLIGRAM(S): 50 CAPSULE, EXTENDED RELEASE ORAL at 21:08

## 2022-08-28 RX ADMIN — Medication 50 MILLIGRAM(S): at 06:14

## 2022-08-28 RX ADMIN — HYDROMORPHONE HYDROCHLORIDE 4 MILLIGRAM(S): 2 INJECTION INTRAMUSCULAR; INTRAVENOUS; SUBCUTANEOUS at 09:05

## 2022-08-28 RX ADMIN — HYDROMORPHONE HYDROCHLORIDE 1 MILLIGRAM(S): 2 INJECTION INTRAMUSCULAR; INTRAVENOUS; SUBCUTANEOUS at 11:44

## 2022-08-28 RX ADMIN — HYDROMORPHONE HYDROCHLORIDE 8 MILLIGRAM(S): 2 INJECTION INTRAMUSCULAR; INTRAVENOUS; SUBCUTANEOUS at 15:32

## 2022-08-28 RX ADMIN — HEPARIN SODIUM 5000 UNIT(S): 5000 INJECTION INTRAVENOUS; SUBCUTANEOUS at 18:56

## 2022-08-28 RX ADMIN — MORPHINE SULFATE 60 MILLIGRAM(S): 50 CAPSULE, EXTENDED RELEASE ORAL at 13:33

## 2022-08-28 RX ADMIN — AMLODIPINE BESYLATE 5 MILLIGRAM(S): 2.5 TABLET ORAL at 06:15

## 2022-08-28 RX ADMIN — HEPARIN SODIUM 5000 UNIT(S): 5000 INJECTION INTRAVENOUS; SUBCUTANEOUS at 06:15

## 2022-08-28 RX ADMIN — PIPERACILLIN AND TAZOBACTAM 25 GRAM(S): 4; .5 INJECTION, POWDER, LYOPHILIZED, FOR SOLUTION INTRAVENOUS at 15:33

## 2022-08-28 RX ADMIN — GABAPENTIN 600 MILLIGRAM(S): 400 CAPSULE ORAL at 13:42

## 2022-08-28 RX ADMIN — MORPHINE SULFATE 60 MILLIGRAM(S): 50 CAPSULE, EXTENDED RELEASE ORAL at 06:15

## 2022-08-28 RX ADMIN — GABAPENTIN 300 MILLIGRAM(S): 400 CAPSULE ORAL at 21:09

## 2022-08-28 RX ADMIN — LIDOCAINE 1 PATCH: 4 CREAM TOPICAL at 21:08

## 2022-08-28 RX ADMIN — PIPERACILLIN AND TAZOBACTAM 25 GRAM(S): 4; .5 INJECTION, POWDER, LYOPHILIZED, FOR SOLUTION INTRAVENOUS at 06:15

## 2022-08-28 RX ADMIN — PIPERACILLIN AND TAZOBACTAM 25 GRAM(S): 4; .5 INJECTION, POWDER, LYOPHILIZED, FOR SOLUTION INTRAVENOUS at 22:33

## 2022-08-28 RX ADMIN — GABAPENTIN 300 MILLIGRAM(S): 400 CAPSULE ORAL at 06:15

## 2022-08-28 NOTE — PROGRESS NOTE ADULT - SUBJECTIVE AND OBJECTIVE BOX
INTERVAL HPI/OVERNIGHT EVENTS:  Patient is a 57yMale  still c/o pain  vandana diet    Vital Signs Last 24 Hrs  T(C): 36.9 (28 Aug 2022 09:10), Max: 37.5 (27 Aug 2022 10:34)  T(F): 98.5 (28 Aug 2022 09:10), Max: 99.5 (27 Aug 2022 10:34)  HR: 114 (28 Aug 2022 09:10) (114 - 128)  BP: 114/90 (28 Aug 2022 09:10) (114/90 - 147/88)  BP(mean): --  RR: 18 (28 Aug 2022 09:10) (16 - 18)  SpO2: 98% (28 Aug 2022 09:10) (95% - 99%)    Parameters below as of 28 Aug 2022 09:10  Patient On (Oxygen Delivery Method): room air       @ 07:01  -   @ 07:00  --------------------------------------------------------  IN: 720 mL / OUT: 4380 mL / NET: -3660 mL        PHYSICAL EXAM:  Constitutional: well developed, well nourished, NAD  Eyes: anicteric  ENMT: normal facies, symmetric  Neck: supple  Respiratory: CTA bilat  Cardiovascular: RRR  Gastrointestinal: abdomen soft, nondistended. No obvious masses. No peritonitis, incision c/d/i, colostomy pink/viable  Extremities: FROM, warm  Neurological: intact, non-focal  Skin: no gross lesions  Lymph Nodes: no gross adenopathy  Musculoskeletal: equal strength bilateral upper and lower extremities  Psychiatric: oriented x 3; appropriate          LABS:                        9.4    16.59 )-----------( 421      ( 28 Aug 2022 08:27 )             29.8         137  |  101  |  5<L>  ----------------------------<  112<H>  3.6   |  29  |  0.49<L>    Ca    8.6      28 Aug 2022 07:03  Phos  2.8       Mg     2.00         TPro  6.1  /  Alb  2.5<L>  /  TBili  0.2  /  DBili  x   /  AST  9   /  ALT  5   /  AlkPhos  76        Urinalysis Basic - ( 27 Aug 2022 03:30 )    Color: Colorless / Appearance: Clear / S.010 / pH: x  Gluc: x / Ketone: Negative  / Bili: Negative / Urobili: <2 mg/dL   Blood: x / Protein: Trace / Nitrite: Negative   Leuk Esterase: Negative / RBC: 35 /HPF / WBC 2 /HPF   Sq Epi: x / Non Sq Epi: 2 /HPF / Bacteria: Negative      Magnesium, Serum: 2.00 mg/dL ( @ 07:03)  Phosphorus Level, Serum: 2.8 mg/dL ( @ 07:03)

## 2022-08-28 NOTE — PROGRESS NOTE ADULT - SUBJECTIVE AND OBJECTIVE BOX
Follow up consult for Acute Pain Management     SUBJECTIVE:  The patient states he is having 8/10 pain at surgical site. Wife is at bedside. Patient states he is still having pain and the PO Dilaudid 4mg helps for 2-3 hours, IVP Dilaudid 1mg helps for 3 hours. Patient's wife states he takes MS Contin 100mg 1-2x/day PRN at home and not as ordered which is TID. He also takes PO Dilaudid 8mg 2x/day at most but it is prescribed 6x/day for him. Patient's opioids are prescribed by his oncology team at [facility] and has mostly tele visits which is why patient does not have a specific oncologist he follows. Patient and his wife state he wants to eventually wean off all the opioids which is why he does not take the medications frequently at home and while inpatient.   		  OBJECTIVE: Pain service called by [facility] for assistance with uncontrolled pain. Patient is sitting up in bed with wife present. Patient awake and alert this time as he was uncertain what medications he has been taking at home when last seen by pain service on 8/25. Patient's wife was able to clarify and confirm home medications.    Pain Score:   (X) Refer to pain scores    Therapy:	[ ] IV PCA	[ ] Epidural   [ ] s/p Spinal Opioid	[ ] Peripheral nerve block  (x) PRN Oral/IV opioids and or Adjuvant non-opioid medications  	  Vital Signs Last 24 Hrs  T(C): 36.7 (28 Aug 2022 13:41), Max: 37.3 (27 Aug 2022 17:53)  T(F): 98.1 (28 Aug 2022 13:41), Max: 99.2 (27 Aug 2022 17:53)  HR: 119 (28 Aug 2022 13:41) (114 - 128)  BP: 140/91 (28 Aug 2022 13:41) (114/90 - 147/88)  BP(mean): --  RR: 18 (28 Aug 2022 13:41) (16 - 18)  SpO2: 98% (28 Aug 2022 13:41) (95% - 99%)    Parameters below as of 28 Aug 2022 13:41  Patient On (Oxygen Delivery Method): room air        ( x) Alert & Oriented     ( ) No motor/sensory block     ( ) Nausea     ( ) Pruritis     ( ) Headache    ASSESSMENT/ PLAN    Therapy to  be:	[x ] Continue   [ ] Discontinued      Documentation and Verification of current medications:   [X] Done	[ ] Not done, not elligible    Comments: Discussed patient with Anesthesia pain attending. MSContin increased to 100mg Q12H, PO Dilaudid increased to 8mg Q6H PRN moderate-severe pain, IVP Dilaudid 1mg Q6H PRN severe breakthrough pain ordered. Recommend continuous pulse oximetry. Recommend palliative care consult. PRN Oral/IV opioids and/or Adjuvant non-opioid medication to be ordered at this point.  Pain service to sign off, no further recommendations for pain medications, at this time.  May call pain service if needed.    Progress Note written now but Patient was seen earlier.

## 2022-08-28 NOTE — PROGRESS NOTE ADULT - SUBJECTIVE AND OBJECTIVE BOX
Plastic Surgery Progress Note (pg [facility]: [id number], [facility]: [phone])    SUBJECTIVE  The patient was seen and examined. No acute events overnight.    OBJECTIVE  ___________________________________________________  VITAL SIGNS / I&O's   Vital Signs Last 24 Hrs  T(C): 37.1 (28 Aug 2022 06:24), Max: 37.5 (27 Aug 2022 10:34)  T(F): 98.7 (28 Aug 2022 06:24), Max: 99.5 (27 Aug 2022 10:34)  HR: 123 (28 Aug 2022 06:24) (120 - 128)  BP: 123/90 (28 Aug 2022 06:24) (118/83 - 147/88)  BP(mean): --  RR: 16 (28 Aug 2022 06:24) (16 - 18)  SpO2: 97% (28 Aug 2022 06:24) (95% - 99%)    Parameters below as of 28 Aug 2022 06:24  Patient On (Oxygen Delivery Method): room air          27 Aug 2022 07:01  -  28 Aug 2022 07:00  --------------------------------------------------------  IN:    Oral Fluid: 720 mL  Total IN: 720 mL    OUT:    Bulb (mL): 10 mL    Bulb (mL): 10 mL    Indwelling Catheter - Urethral (mL): 4360 mL  Total OUT: 4380 mL    Total NET: -3660 mL        ___________________________________________________  PHYSICAL EXAM    -- CONSTITUTIONAL: NAD, lying in bed  -- NEURO: Awake, alert  -- PULM: Non-labored respirations  -- ABDOMEN: incision is c/d/i, no erythema, JPx2 to suction s/s output,   -- PERINEUM: flap warm, incisions intact, stable good color and cap refill, healthy appearing, area of darker skin on posterior surface from prior tattoo, dressing c/d/i    ___________________________________________________  LABS                        8.9    18.22 )-----------( 395      ( 27 Aug 2022 07:32 )             27.4     28 Aug 2022 07:03    137    |  101    |  5      ----------------------------<  112    3.6     |  29     |  0.49     Ca    8.6        28 Aug 2022 07:03  Phos  2.8       28 Aug 2022 07:03  Mg     2.00      28 Aug 2022 07:03    TPro  6.1    /  Alb  2.5    /  TBili  0.2    /  DBili  x      /  AST  9      /  ALT  5      /  AlkPhos  76     28 Aug 2022 07:03      CAPILLARY BLOOD GLUCOSE            Urinalysis Basic - ( 27 Aug 2022 03:30 )    Color: Colorless / Appearance: Clear / S.010 / pH: x  Gluc: x / Ketone: Negative  / Bili: Negative / Urobili: <2 mg/dL   Blood: x / Protein: Trace / Nitrite: Negative   Leuk Esterase: Negative / RBC: 35 /HPF / WBC 2 /HPF   Sq Epi: x / Non Sq Epi: 2 /HPF / Bacteria: Negative      ___________________________________________________  MICRO  Recent Cultures:    ___________________________________________________  MEDICATIONS  (STANDING):  amLODIPine   Tablet 5 milliGRAM(s) Oral daily  bictegravir 50 mG/emtricitabine 200 mG/tenofovir alafenamide 25 mG (BIKTARVY) 1 Tablet(s) Oral daily  chlorhexidine 2% Cloths 1 Application(s) Topical daily  gabapentin 600 milliGRAM(s) Oral <User Schedule>  gabapentin 300 milliGRAM(s) Oral <User Schedule>  heparin   Injectable 5000 Unit(s) SubCutaneous every 12 hours  metoprolol succinate ER 50 milliGRAM(s) Oral daily  morphine ER Tablet 60 milliGRAM(s) Oral every 8 hours  piperacillin/tazobactam IVPB.. 3.375 Gram(s) IV Intermittent every 8 hours    MEDICATIONS  (PRN):  HYDROmorphone   Tablet 4 milliGRAM(s) Oral every 4 hours PRN Moderate- Severe Pain (4 - 10)  HYDROmorphone  Injectable 1 milliGRAM(s) IV Push every 4 hours PRN Severe Breakthrough Pain (7 - 10)  naloxone Injectable 0.1 milliGRAM(s) IV Push every 3 minutes PRN For ANY of the following changes in patient status:  A. RR LESS THAN 10 breaths per minute, B. Oxygen saturation LESS THAN 90%, C. Sedation score of 6  ondansetron Injectable 4 milliGRAM(s) IV Push every 6 hours PRN Nausea

## 2022-08-28 NOTE — PROGRESS NOTE ADULT - ASSESSMENT
57y Male with Hx rectal/prostate cancer s/p radiation and colostomy, lower GIB, now s/p APR with end colostomy creation, VRAM flap closure on 8/24    - flap healthy-appearing  - bacitracin, xeroform, abd pads over flap  - JPx2 suction, drain care  - NO SITTING- patient may lay on back or stand  - assistance with ambulating and moving from laying to standing  - f/u fever workup?CT ABd  - please re start home meds    PRS  [id number]

## 2022-08-29 LAB
ALBUMIN SERPL ELPH-MCNC: 2.6 G/DL — LOW (ref 3.3–5)
ALP SERPL-CCNC: 80 U/L — SIGNIFICANT CHANGE UP (ref 40–120)
ALT FLD-CCNC: 12 U/L — SIGNIFICANT CHANGE UP (ref 4–41)
ANION GAP SERPL CALC-SCNC: 9 MMOL/L — SIGNIFICANT CHANGE UP (ref 7–14)
APPEARANCE UR: ABNORMAL
AST SERPL-CCNC: 17 U/L — SIGNIFICANT CHANGE UP (ref 4–40)
BACTERIA # UR AUTO: ABNORMAL
BILIRUB SERPL-MCNC: 0.2 MG/DL — SIGNIFICANT CHANGE UP (ref 0.2–1.2)
BILIRUB UR-MCNC: NEGATIVE — SIGNIFICANT CHANGE UP
BUN SERPL-MCNC: 8 MG/DL — SIGNIFICANT CHANGE UP (ref 7–23)
CALCIUM SERPL-MCNC: 8.5 MG/DL — SIGNIFICANT CHANGE UP (ref 8.4–10.5)
CHLORIDE SERPL-SCNC: 100 MMOL/L — SIGNIFICANT CHANGE UP (ref 98–107)
CO2 SERPL-SCNC: 27 MMOL/L — SIGNIFICANT CHANGE UP (ref 22–31)
COLOR SPEC: SIGNIFICANT CHANGE UP
CREAT SERPL-MCNC: 0.52 MG/DL — SIGNIFICANT CHANGE UP (ref 0.5–1.3)
DIFF PNL FLD: ABNORMAL
EGFR: 118 ML/MIN/1.73M2 — SIGNIFICANT CHANGE UP
EPI CELLS # UR: 5 /HPF — SIGNIFICANT CHANGE UP (ref 0–5)
GLUCOSE SERPL-MCNC: 108 MG/DL — HIGH (ref 70–99)
GLUCOSE UR QL: ABNORMAL
HCT VFR BLD CALC: 27.3 % — LOW (ref 39–50)
HGB BLD-MCNC: 8.8 G/DL — LOW (ref 13–17)
HYALINE CASTS # UR AUTO: 9 /LPF — HIGH (ref 0–7)
KETONES UR-MCNC: NEGATIVE — SIGNIFICANT CHANGE UP
LEUKOCYTE ESTERASE UR-ACNC: NEGATIVE — SIGNIFICANT CHANGE UP
MAGNESIUM SERPL-MCNC: 2 MG/DL — SIGNIFICANT CHANGE UP (ref 1.6–2.6)
MCHC RBC-ENTMCNC: 28 PG — SIGNIFICANT CHANGE UP (ref 27–34)
MCHC RBC-ENTMCNC: 32.2 GM/DL — SIGNIFICANT CHANGE UP (ref 32–36)
MCV RBC AUTO: 86.9 FL — SIGNIFICANT CHANGE UP (ref 80–100)
NITRITE UR-MCNC: NEGATIVE — SIGNIFICANT CHANGE UP
NRBC # BLD: 0 /100 WBCS — SIGNIFICANT CHANGE UP (ref 0–0)
NRBC # FLD: 0 K/UL — SIGNIFICANT CHANGE UP (ref 0–0)
PH UR: 7 — SIGNIFICANT CHANGE UP (ref 5–8)
PHOSPHATE SERPL-MCNC: 3.1 MG/DL — SIGNIFICANT CHANGE UP (ref 2.5–4.5)
PLATELET # BLD AUTO: 406 K/UL — HIGH (ref 150–400)
POTASSIUM SERPL-MCNC: 4 MMOL/L — SIGNIFICANT CHANGE UP (ref 3.5–5.3)
POTASSIUM SERPL-SCNC: 4 MMOL/L — SIGNIFICANT CHANGE UP (ref 3.5–5.3)
PROT SERPL-MCNC: 6.1 G/DL — SIGNIFICANT CHANGE UP (ref 6–8.3)
PROT UR-MCNC: ABNORMAL
RBC # BLD: 3.14 M/UL — LOW (ref 4.2–5.8)
RBC # FLD: 15.9 % — HIGH (ref 10.3–14.5)
RBC CASTS # UR COMP ASSIST: 23 /HPF — HIGH (ref 0–4)
SODIUM SERPL-SCNC: 136 MMOL/L — SIGNIFICANT CHANGE UP (ref 135–145)
SP GR SPEC: 1.01 — SIGNIFICANT CHANGE UP (ref 1.01–1.05)
UROBILINOGEN FLD QL: SIGNIFICANT CHANGE UP
WBC # BLD: 17.42 K/UL — HIGH (ref 3.8–10.5)
WBC # FLD AUTO: 17.42 K/UL — HIGH (ref 3.8–10.5)
WBC UR QL: 6 /HPF — HIGH (ref 0–5)

## 2022-08-29 PROCEDURE — 99222 1ST HOSP IP/OBS MODERATE 55: CPT

## 2022-08-29 PROCEDURE — 71045 X-RAY EXAM CHEST 1 VIEW: CPT | Mod: 26

## 2022-08-29 RX ORDER — ZOLPIDEM TARTRATE 10 MG/1
5 TABLET ORAL AT BEDTIME
Refills: 0 | Status: DISCONTINUED | OUTPATIENT
Start: 2022-08-29 | End: 2022-09-02

## 2022-08-29 RX ORDER — ACETAMINOPHEN 500 MG
650 TABLET ORAL EVERY 6 HOURS
Refills: 0 | Status: DISCONTINUED | OUTPATIENT
Start: 2022-08-29 | End: 2022-09-02

## 2022-08-29 RX ADMIN — ZOLPIDEM TARTRATE 5 MILLIGRAM(S): 10 TABLET ORAL at 22:28

## 2022-08-29 RX ADMIN — LIDOCAINE 1 PATCH: 4 CREAM TOPICAL at 06:47

## 2022-08-29 RX ADMIN — LIDOCAINE 1 PATCH: 4 CREAM TOPICAL at 21:10

## 2022-08-29 RX ADMIN — AMLODIPINE BESYLATE 5 MILLIGRAM(S): 2.5 TABLET ORAL at 06:26

## 2022-08-29 RX ADMIN — GABAPENTIN 300 MILLIGRAM(S): 400 CAPSULE ORAL at 21:11

## 2022-08-29 RX ADMIN — Medication 650 MILLIGRAM(S): at 02:07

## 2022-08-29 RX ADMIN — Medication 650 MILLIGRAM(S): at 21:10

## 2022-08-29 RX ADMIN — Medication 650 MILLIGRAM(S): at 02:50

## 2022-08-29 RX ADMIN — HYDROMORPHONE HYDROCHLORIDE 8 MILLIGRAM(S): 2 INJECTION INTRAMUSCULAR; INTRAVENOUS; SUBCUTANEOUS at 02:07

## 2022-08-29 RX ADMIN — Medication 650 MILLIGRAM(S): at 13:51

## 2022-08-29 RX ADMIN — GABAPENTIN 600 MILLIGRAM(S): 400 CAPSULE ORAL at 13:50

## 2022-08-29 RX ADMIN — PIPERACILLIN AND TAZOBACTAM 25 GRAM(S): 4; .5 INJECTION, POWDER, LYOPHILIZED, FOR SOLUTION INTRAVENOUS at 15:42

## 2022-08-29 RX ADMIN — HYDROMORPHONE HYDROCHLORIDE 8 MILLIGRAM(S): 2 INJECTION INTRAMUSCULAR; INTRAVENOUS; SUBCUTANEOUS at 03:05

## 2022-08-29 RX ADMIN — Medication 50 MILLIGRAM(S): at 06:26

## 2022-08-29 RX ADMIN — HEPARIN SODIUM 5000 UNIT(S): 5000 INJECTION INTRAVENOUS; SUBCUTANEOUS at 06:26

## 2022-08-29 RX ADMIN — LIDOCAINE 1 PATCH: 4 CREAM TOPICAL at 19:17

## 2022-08-29 RX ADMIN — HYDROMORPHONE HYDROCHLORIDE 8 MILLIGRAM(S): 2 INJECTION INTRAMUSCULAR; INTRAVENOUS; SUBCUTANEOUS at 15:13

## 2022-08-29 RX ADMIN — BICTEGRAVIR SODIUM, EMTRICITABINE, AND TENOFOVIR ALAFENAMIDE FUMARATE 1 TABLET(S): 30; 120; 15 TABLET ORAL at 11:37

## 2022-08-29 RX ADMIN — PIPERACILLIN AND TAZOBACTAM 25 GRAM(S): 4; .5 INJECTION, POWDER, LYOPHILIZED, FOR SOLUTION INTRAVENOUS at 06:25

## 2022-08-29 RX ADMIN — GABAPENTIN 300 MILLIGRAM(S): 400 CAPSULE ORAL at 06:26

## 2022-08-29 RX ADMIN — MORPHINE SULFATE 100 MILLIGRAM(S): 50 CAPSULE, EXTENDED RELEASE ORAL at 21:09

## 2022-08-29 RX ADMIN — MORPHINE SULFATE 100 MILLIGRAM(S): 50 CAPSULE, EXTENDED RELEASE ORAL at 09:26

## 2022-08-29 RX ADMIN — ZOLPIDEM TARTRATE 5 MILLIGRAM(S): 10 TABLET ORAL at 02:19

## 2022-08-29 RX ADMIN — PIPERACILLIN AND TAZOBACTAM 25 GRAM(S): 4; .5 INJECTION, POWDER, LYOPHILIZED, FOR SOLUTION INTRAVENOUS at 22:27

## 2022-08-29 RX ADMIN — Medication 650 MILLIGRAM(S): at 22:15

## 2022-08-29 RX ADMIN — HYDROMORPHONE HYDROCHLORIDE 8 MILLIGRAM(S): 2 INJECTION INTRAMUSCULAR; INTRAVENOUS; SUBCUTANEOUS at 13:51

## 2022-08-29 RX ADMIN — HEPARIN SODIUM 5000 UNIT(S): 5000 INJECTION INTRAVENOUS; SUBCUTANEOUS at 17:26

## 2022-08-29 NOTE — CONSULT NOTE ADULT - ATTENDING COMMENTS
I personally reviewed the patient's outpt echo. He has LVH with a hyperdynamic LV at baseline. I suspect his Sinus tachycardia is largely the result of volume depletion. Pain and fever may also be playing a role. In general, sinus tachycardia is not a primary cardiac dysrhythmia and is responding to an underlying general medical issue.

## 2022-08-29 NOTE — PROGRESS NOTE ADULT - ASSESSMENT
57M with PMHx rectal/prostate cancer s/p radiation, hx of sigmoid colostomy in March with Dr. Avilez, s/p hospitalization and SICU admission for lower GIB, discharged 8/19, now s/p APR with end colostomy creation, VRAM flap closure on 8/24.    Plan:  - Work with PT for OOB WITHOUT sitting per PRS recommendations  - Diet: LRD  - +BM, + Flatus  - Strict I&O's  - Monitor TALI drain output  - LR 100ml/hr  - Continue Sahu due to low anastamosis   - Zosyn  - Restarting home BP and HIV medications.  - DVT prophylaxis: SubQ Heparin    Team A  P# [id number]

## 2022-08-29 NOTE — CONSULT NOTE ADULT - ASSESSMENT
Mr. Turcios is a 57yoM  rectal/prostate cancer s/p radiation and colostomy, lower GIB, now s/p APR with end colostomy creation, VRAM flap closure on 8/24 now with sinus tachycardia.     #Sinus Tachycardia  Sinus Tachycardia is a normal response rhythm to some insult. TTE results from 8/2/22 with normal LVEF. Patient have cancer, HIV, just had substantial surgery with post op pain, climbing WBC's with fevers, worsening anemia from admission, meets SIRS criteria. All of which could contribute to but is not limited to tachycardia.   -Do not recommend specific management of sinus tachycardia.   -Further evaluation of non-cardiac causes of sinus tachycardia per primary team.

## 2022-08-29 NOTE — PROGRESS NOTE ADULT - ASSESSMENT
57y Male with Hx rectal/prostate cancer s/p radiation and colostomy, lower GIB, now s/p APR with end colostomy creation, VRAM flap closure on 8/24    - flap healthy-appearing  - bacitracin, xeroform, abd pads over flap  - JPx2 suction, drain care  - NO SITTING- patient may lay on back or stand  - assistance with ambulating and moving from laying to standing  - f/u fever workup; recommend CT A/P and PE r/o  -appreciate great care from primary team    Maximino Frazier  PRS  [id number]

## 2022-08-29 NOTE — PROGRESS NOTE ADULT - SUBJECTIVE AND OBJECTIVE BOX
COLORECTAL SURGERY PROGRESS NOTE    STATUS POST:    POST OPERATIVE DAY #:       SUBJECTIVE    OVERNIGHT EVENTS: Patient evaluated for concern temp 100.5F and persistently tachy. Patient asymptomatic. Abd is soft nondistended, pain in left quadrant. Ostomy w/stool. Given 650mg tyelnol prn for fever. Encouraged IS at bedside. Patient requested home ambien 10mg, given.    Patient is still endorsing some pain, resting comfortably in bed. Tolerating diet. Denies N/V.     10-point review of systems completed and negative except as noted above.      OBJECTIVE    MEDICATIONS  acetaminophen     Tablet .. 650 milliGRAM(s) Oral every 6 hours PRN  amLODIPine   Tablet 5 milliGRAM(s) Oral daily  bictegravir 50 mG/emtricitabine 200 mG/tenofovir alafenamide 25 mG (BIKTARVY) 1 Tablet(s) Oral daily  chlorhexidine 2% Cloths 1 Application(s) Topical daily  gabapentin 600 milliGRAM(s) Oral <User Schedule>  gabapentin 300 milliGRAM(s) Oral <User Schedule>  heparin   Injectable 5000 Unit(s) SubCutaneous every 12 hours  HYDROmorphone   Tablet 8 milliGRAM(s) Oral every 6 hours PRN  HYDROmorphone  Injectable 1 milliGRAM(s) IV Push every 6 hours PRN  lidocaine   4% Patch 1 Patch Transdermal every 24 hours  metoprolol succinate ER 50 milliGRAM(s) Oral daily  morphine ER Tablet 100 milliGRAM(s) Oral every 12 hours  naloxone Injectable 0.1 milliGRAM(s) IV Push every 3 minutes PRN  ondansetron Injectable 4 milliGRAM(s) IV Push every 6 hours PRN  piperacillin/tazobactam IVPB.. 3.375 Gram(s) IV Intermittent every 8 hours  zolpidem 5 milliGRAM(s) Oral at bedtime PRN  zolpidem 5 milliGRAM(s) Oral at bedtime PRN      PHYSICAL EXAM  T(C): 37 (08-29-22 @ 06:16), Max: 38.1 (08-29-22 @ 01:50)  HR: 117 (08-29-22 @ 06:16) (114 - 125)  BP: 129/81 (08-29-22 @ 06:16) (114/90 - 140/91)  RR: 19 (08-29-22 @ 06:16) (18 - 19)  SpO2: 99% (08-29-22 @ 06:16) (98% - 99%)    08-28-22 @ 07:01  -  08-29-22 @ 07:00  --------------------------------------------------------  IN: 0 mL / OUT: 4300 mL / NET: -4300 mL        General: Appears well, NAD  General Appearance: Appears well, NAD  Respiratory: No labored breathing  CV: Pulse regularly present  Abdomen: Soft, appropriately tender to palpation around the incision, Incision c/d/i, uncovered, Ostomy pink.      LABS                        9.4    16.59 )-----------( 421      ( 28 Aug 2022 08:27 )             29.8     08-29    136  |  100  |  8   ----------------------------<  108<H>  4.0   |  27  |  0.52    Ca    8.5      29 Aug 2022 05:30  Phos  3.1     08-29  Mg     2.00     08-29    TPro  6.1  /  Alb  2.6<L>  /  TBili  0.2  /  DBili  x   /  AST  17  /  ALT  12  /  AlkPhos  80  08-29          RADIOLOGY & ADDITIONAL STUDIES

## 2022-08-29 NOTE — CONSULT NOTE ADULT - SUBJECTIVE AND OBJECTIVE BOX
Patient seen and evaluated at bedside    Reason for consult: sinus tachycardia.     HPI:  57M with Hx rectal/prostate cancer s/p radiation, hx of sigmoid colostomy in March with Dr. Avilez presented to [facility] ED on 8/16/22 with lower GI bleed that began at 9pm 8/15. Patient was transferred from [facility], s/p 2 unit PRBC. MTP initiated, amitted to colorectal surgery service and transferred to SICU for hemorrhage watch. Patient discharged from hospital and now presents to [facility] for scheduled abdominal peritoneal resection with Dr. Betancourt.  Patient in usual state of health per himself and wife at bedside and has no complaints at this time.     (24 Aug 2022 12:51)      PMHx:   DM (diabetes mellitus)    HTN (hypertension)    HLD (hyperlipidemia)    HIV infection    Depressive disorder    Anxiety    Prostate cancer    Anal cancer    Diverticulosis        PSHx:   No significant past surgical history    Anal lesion        Allergies:  Allergy Status Unknown  No Known Allergies      Home Meds:    Current Medications:   acetaminophen     Tablet .. 650 milliGRAM(s) Oral every 6 hours PRN  amLODIPine   Tablet 5 milliGRAM(s) Oral daily  bictegravir 50 mG/emtricitabine 200 mG/tenofovir alafenamide 25 mG (BIKTARVY) 1 Tablet(s) Oral daily  chlorhexidine 2% Cloths 1 Application(s) Topical daily  gabapentin 600 milliGRAM(s) Oral <User Schedule>  gabapentin 300 milliGRAM(s) Oral <User Schedule>  heparin   Injectable 5000 Unit(s) SubCutaneous every 12 hours  HYDROmorphone   Tablet 8 milliGRAM(s) Oral every 6 hours PRN  HYDROmorphone  Injectable 1 milliGRAM(s) IV Push every 6 hours PRN  lidocaine   4% Patch 1 Patch Transdermal every 24 hours  metoprolol succinate ER 50 milliGRAM(s) Oral daily  morphine ER Tablet 100 milliGRAM(s) Oral every 12 hours  naloxone Injectable 0.1 milliGRAM(s) IV Push every 3 minutes PRN  ondansetron Injectable 4 milliGRAM(s) IV Push every 6 hours PRN  piperacillin/tazobactam IVPB.. 3.375 Gram(s) IV Intermittent every 8 hours  zolpidem 5 milliGRAM(s) Oral at bedtime PRN  zolpidem 5 milliGRAM(s) Oral at bedtime PRN      FAMILY HISTORY:  No pertinent family history in first degree relatives        Social History:  Smoking History:  Alcohol Use:  Drug Use:    Review of Systems:  REVIEW OF SYSTEMS:  CONSTITUTIONAL: No weakness, fevers or chills  EYES/ENT: No visual changes;  No dysphagia  NECK: No pain or stiffness  RESPIRATORY: No cough, wheezing, hemoptysis; No shortness of breath  CARDIOVASCULAR: No chest pain or palpitations; No lower extremity edema  GASTROINTESTINAL: No abdominal or epigastric pain. No nausea, vomiting, or hematemesis; No diarrhea or constipation. No melena or hematochezia.  BACK: No back pain  GENITOURINARY: No dysuria, frequency or hematuria  NEUROLOGICAL: No numbness or weakness  SKIN: No itching, burning, rashes, or lesions   All other review of systems is negative unless indicated above.    [ ] All other systems negative  [ ] Unable to assess ROS due to    Physical Exam:  T(F): 100.7 (08-29), Max: 100.7 (08-29)  HR: 123 (08-29) (117 - 125)  BP: 124/76 (08-29) (111/74 - 139/93)  RR: 20 (08-29)  SpO2: 98% (08-29)  GENERAL: No acute distress, well-developed  HEAD:  Atraumatic, Normocephalic  ENT: EOMI, PERRLA, conjunctiva and sclera clear, Neck supple, No JVD, moist mucosa  CHEST/LUNG: Clear to auscultation bilaterally; No wheeze, equal breath sounds bilaterally   BACK: No spinal tenderness  HEART: Regular rate and rhythm; No murmurs, rubs, or gallops  ABDOMEN: Soft, Nontender, Nondistended; Bowel sounds present  EXTREMITIES:  No clubbing, cyanosis, or edema  PSYCH: Nl behavior, nl affect  NEUROLOGY: AAOx3, non-focal, cranial nerves intact  SKIN: Normal color, No rashes or lesions  LINES:    Cardiovascular Diagnostic Testing:    CXR: Personally reviewed    Labs: Personally reviewed                        8.8    17.42 )-----------( 406      ( 29 Aug 2022 10:33 )             27.3     08-29    136  |  100  |  8   ----------------------------<  108<H>  4.0   |  27  |  0.52    Ca    8.5      29 Aug 2022 05:30  Phos  3.1     08-29  Mg     2.00     08-29    TPro  6.1  /  Alb  2.6<L>  /  TBili  0.2  /  DBili  x   /  AST  17  /  ALT  12  /  AlkPhos  80  08-29

## 2022-08-29 NOTE — PROVIDER CONTACT NOTE (OTHER) - ACTION/TREATMENT ORDERED:
team notified, no interventions at this time except for administration of Tylenol. fever workup already done.
EKG Ordered. MD will come to bedside to assess
BHUPINDER Kwok MD A team notified. MD states he will visit pt. @ bedside. No further orders at this time. Will continue to monitor.

## 2022-08-29 NOTE — CHART NOTE - NSCHARTNOTEFT_GEN_A_CORE
Pt seen at bedside w/concern for temp 100.5F and persistently tachy, pt has no complaints. Abd is soft nondistended, pain in left quadrant. Ostomy w/stool. Given 650mg tyelnol prn for fever. Encouraged IS at bedside. Pt seen at bedside w/concern for temp 100.5F and persistently tachy, pt has no complaints. Abd is soft nondistended, pain in left quadrant. Ostomy w/stool. Given 650mg tyelnol prn for fever. Encouraged IS at bedside.   Pt requested home ambien 10mg, given.

## 2022-08-29 NOTE — PROVIDER CONTACT NOTE (OTHER) - BACKGROUND
S/P Myocutaneous flap of trunk & colostomy creation
S/P Myocutaneous flap of trunk & colostomy creation
s/p Colostomy take down with new end created and Myocutaneous flap of trunk.

## 2022-08-30 LAB
ANION GAP SERPL CALC-SCNC: 8 MMOL/L — SIGNIFICANT CHANGE UP (ref 7–14)
BUN SERPL-MCNC: 8 MG/DL — SIGNIFICANT CHANGE UP (ref 7–23)
CALCIUM SERPL-MCNC: 8.5 MG/DL — SIGNIFICANT CHANGE UP (ref 8.4–10.5)
CHLORIDE SERPL-SCNC: 97 MMOL/L — LOW (ref 98–107)
CO2 SERPL-SCNC: 26 MMOL/L — SIGNIFICANT CHANGE UP (ref 22–31)
CREAT SERPL-MCNC: 0.52 MG/DL — SIGNIFICANT CHANGE UP (ref 0.5–1.3)
EGFR: 118 ML/MIN/1.73M2 — SIGNIFICANT CHANGE UP
GLUCOSE SERPL-MCNC: 106 MG/DL — HIGH (ref 70–99)
HCT VFR BLD CALC: 27.1 % — LOW (ref 39–50)
HGB BLD-MCNC: 8.7 G/DL — LOW (ref 13–17)
MAGNESIUM SERPL-MCNC: 1.9 MG/DL — SIGNIFICANT CHANGE UP (ref 1.6–2.6)
MCHC RBC-ENTMCNC: 27.9 PG — SIGNIFICANT CHANGE UP (ref 27–34)
MCHC RBC-ENTMCNC: 32.1 GM/DL — SIGNIFICANT CHANGE UP (ref 32–36)
MCV RBC AUTO: 86.9 FL — SIGNIFICANT CHANGE UP (ref 80–100)
NRBC # BLD: 0 /100 WBCS — SIGNIFICANT CHANGE UP (ref 0–0)
NRBC # FLD: 0 K/UL — SIGNIFICANT CHANGE UP (ref 0–0)
PHOSPHATE SERPL-MCNC: 3.1 MG/DL — SIGNIFICANT CHANGE UP (ref 2.5–4.5)
PLATELET # BLD AUTO: 390 K/UL — SIGNIFICANT CHANGE UP (ref 150–400)
POTASSIUM SERPL-MCNC: 3.8 MMOL/L — SIGNIFICANT CHANGE UP (ref 3.5–5.3)
POTASSIUM SERPL-SCNC: 3.8 MMOL/L — SIGNIFICANT CHANGE UP (ref 3.5–5.3)
RBC # BLD: 3.12 M/UL — LOW (ref 4.2–5.8)
RBC # FLD: 16.1 % — HIGH (ref 10.3–14.5)
SODIUM SERPL-SCNC: 131 MMOL/L — LOW (ref 135–145)
WBC # BLD: 17.51 K/UL — HIGH (ref 3.8–10.5)
WBC # FLD AUTO: 17.51 K/UL — HIGH (ref 3.8–10.5)

## 2022-08-30 PROCEDURE — 74177 CT ABD & PELVIS W/CONTRAST: CPT | Mod: 26

## 2022-08-30 PROCEDURE — 71275 CT ANGIOGRAPHY CHEST: CPT | Mod: 26

## 2022-08-30 RX ORDER — SODIUM CHLORIDE 9 MG/ML
1000 INJECTION, SOLUTION INTRAVENOUS ONCE
Refills: 0 | Status: COMPLETED | OUTPATIENT
Start: 2022-08-30 | End: 2022-08-30

## 2022-08-30 RX ORDER — POTASSIUM CHLORIDE 20 MEQ
20 PACKET (EA) ORAL ONCE
Refills: 0 | Status: COMPLETED | OUTPATIENT
Start: 2022-08-30 | End: 2022-08-30

## 2022-08-30 RX ORDER — SODIUM CHLORIDE 9 MG/ML
1000 INJECTION, SOLUTION INTRAVENOUS
Refills: 0 | Status: DISCONTINUED | OUTPATIENT
Start: 2022-08-30 | End: 2022-08-31

## 2022-08-30 RX ADMIN — PIPERACILLIN AND TAZOBACTAM 25 GRAM(S): 4; .5 INJECTION, POWDER, LYOPHILIZED, FOR SOLUTION INTRAVENOUS at 06:15

## 2022-08-30 RX ADMIN — HYDROMORPHONE HYDROCHLORIDE 8 MILLIGRAM(S): 2 INJECTION INTRAMUSCULAR; INTRAVENOUS; SUBCUTANEOUS at 18:30

## 2022-08-30 RX ADMIN — HEPARIN SODIUM 5000 UNIT(S): 5000 INJECTION INTRAVENOUS; SUBCUTANEOUS at 05:43

## 2022-08-30 RX ADMIN — MORPHINE SULFATE 100 MILLIGRAM(S): 50 CAPSULE, EXTENDED RELEASE ORAL at 22:15

## 2022-08-30 RX ADMIN — SODIUM CHLORIDE 1000 MILLILITER(S): 9 INJECTION, SOLUTION INTRAVENOUS at 09:15

## 2022-08-30 RX ADMIN — CHLORHEXIDINE GLUCONATE 1 APPLICATION(S): 213 SOLUTION TOPICAL at 11:58

## 2022-08-30 RX ADMIN — PIPERACILLIN AND TAZOBACTAM 25 GRAM(S): 4; .5 INJECTION, POWDER, LYOPHILIZED, FOR SOLUTION INTRAVENOUS at 22:16

## 2022-08-30 RX ADMIN — Medication 50 MILLIGRAM(S): at 05:43

## 2022-08-30 RX ADMIN — Medication 20 MILLIEQUIVALENT(S): at 10:58

## 2022-08-30 RX ADMIN — MORPHINE SULFATE 100 MILLIGRAM(S): 50 CAPSULE, EXTENDED RELEASE ORAL at 10:20

## 2022-08-30 RX ADMIN — LIDOCAINE 1 PATCH: 4 CREAM TOPICAL at 06:46

## 2022-08-30 RX ADMIN — BICTEGRAVIR SODIUM, EMTRICITABINE, AND TENOFOVIR ALAFENAMIDE FUMARATE 1 TABLET(S): 30; 120; 15 TABLET ORAL at 11:56

## 2022-08-30 RX ADMIN — LIDOCAINE 1 PATCH: 4 CREAM TOPICAL at 09:36

## 2022-08-30 RX ADMIN — GABAPENTIN 300 MILLIGRAM(S): 400 CAPSULE ORAL at 05:43

## 2022-08-30 RX ADMIN — GABAPENTIN 600 MILLIGRAM(S): 400 CAPSULE ORAL at 13:12

## 2022-08-30 RX ADMIN — ZOLPIDEM TARTRATE 5 MILLIGRAM(S): 10 TABLET ORAL at 05:46

## 2022-08-30 RX ADMIN — HEPARIN SODIUM 5000 UNIT(S): 5000 INJECTION INTRAVENOUS; SUBCUTANEOUS at 18:17

## 2022-08-30 RX ADMIN — SODIUM CHLORIDE 100 MILLILITER(S): 9 INJECTION, SOLUTION INTRAVENOUS at 11:55

## 2022-08-30 RX ADMIN — AMLODIPINE BESYLATE 5 MILLIGRAM(S): 2.5 TABLET ORAL at 05:43

## 2022-08-30 RX ADMIN — PIPERACILLIN AND TAZOBACTAM 25 GRAM(S): 4; .5 INJECTION, POWDER, LYOPHILIZED, FOR SOLUTION INTRAVENOUS at 14:55

## 2022-08-30 RX ADMIN — GABAPENTIN 300 MILLIGRAM(S): 400 CAPSULE ORAL at 22:14

## 2022-08-30 RX ADMIN — ZOLPIDEM TARTRATE 5 MILLIGRAM(S): 10 TABLET ORAL at 22:14

## 2022-08-30 RX ADMIN — LIDOCAINE 1 PATCH: 4 CREAM TOPICAL at 22:15

## 2022-08-30 RX ADMIN — MORPHINE SULFATE 100 MILLIGRAM(S): 50 CAPSULE, EXTENDED RELEASE ORAL at 09:20

## 2022-08-30 NOTE — PROGRESS NOTE ADULT - ASSESSMENT
57M with PMHx rectal/prostate cancer s/p radiation, hx of sigmoid colostomy in March with Dr. Avilez, s/p hospitalization and SICU admission for lower GIB, discharged 8/19, now s/p APR with end colostomy creation, VRAM flap closure on 8/24.    Plan:  - Work with PT for OOB WITHOUT sitting per PRS recommendations  - Diet: LRD  - +BM, + Flatus  - Strict I&O's  - Monitor TALI drain output  - LR 100ml/hr and 1L LR bolus   - Continue Sahu due to low anastamosis   - Zosyn  - Restarting home BP and HIV medications  - DVT prophylaxis: SubQ Heparin    Team A  P# [id number]

## 2022-08-30 NOTE — PROGRESS NOTE ADULT - ASSESSMENT
57y Male with Hx rectal/prostate cancer s/p radiation and colostomy, lower GIB, now s/p APR with end colostomy creation, VRAM flap closure on 8/24    - flap healthy-appearing  - bacitracin, xeroform, abd pads over flap  - JPx2 suction, drain care  - NO SITTING- patient may lay on back or stand  - assistance with ambulating and moving from laying to standing  - f/u fever workup; f/u CT A/P and PE r/o results  -okay to remove davis, but patient cannot sit to urinate  -appreciate great care from primary team    Maximino Frazier  PRS  [id number]

## 2022-08-30 NOTE — CONSULT NOTE ADULT - ASSESSMENT
Interventional Radiology    Evaluate for Procedure: Left thigh abscess aspiration +/- drainage    HPI: 57y Male with hx rectal cancer sp radiation and colostomy, lower GIB, now sp APR w end colostomy creation. CTAP 8/30 demonstrating 6 x 3 cm abscess medial L upper thigh. IR consulted for aspiration +/- drainage.     Allergies:   Medications (Abx/Cardiac/Anticoagulation/Blood Products)    amLODIPine   Tablet: 5 milliGRAM(s) Oral (08-30 @ 05:43)  bictegravir 50 mG/emtricitabine 200 mG/tenofovir alafenamide 25 mG (BIKTARVY): 1 Tablet(s) Oral (08-30 @ 11:56)  heparin   Injectable: 5000 Unit(s) SubCutaneous (08-30 @ 18:17)  metoprolol succinate ER: 50 milliGRAM(s) Oral (08-30 @ 05:43)  piperacillin/tazobactam IVPB..: 25 mL/Hr IV Intermittent (08-30 @ 14:55)    Data:    T(C): 37.3  HR: 120  BP: 124/84  RR: 18  SpO2: 100%    -WBC 17.51 / HgB 8.7 / Hct 27.1 / Plt 390  -Na 131 / Cl 97 / BUN 8 / Glucose 106  -K 3.8 / CO2 26 / Cr 0.52  -ALT -- / Alk Phos -- / T.Bili --  -INR 1.40 / PTT 24.8      Radiology:   Imaging reviewed     Assessment/Plan:   57y Male with hx rectal cancer sp radiation and colostomy, lower GIB, now sp APR w end colostomy creation. CTAP 8/30 demonstrating 6 x 3 cm abscess medial L upper thigh. IR consulted for aspiration +/- drainage.     -- IR will plan to perform aspiration +/- drainage 8/31  -- NPO at midnight on 8/30  -- hold a.m. anticoagulation on 8/31  -- AM labs 8/31: CBC, BMP, Coags  -- please place IR procedure request order under Dr. Tolbert     Interventional Radiology    Evaluate for Procedure: Left thigh abscess aspiration +/- drainage    HPI: 57y Male with hx rectal cancer sp radiation and colostomy, lower GIB, now sp APR w end colostomy creation. CTAP 8/30 demonstrating 6 x 3 cm abscess medial L upper thigh. IR consulted for aspiration +/- drainage.     Allergies:   Medications (Abx/Cardiac/Anticoagulation/Blood Products)    amLODIPine   Tablet: 5 milliGRAM(s) Oral (08-30 @ 05:43)  bictegravir 50 mG/emtricitabine 200 mG/tenofovir alafenamide 25 mG (BIKTARVY): 1 Tablet(s) Oral (08-30 @ 11:56)  heparin   Injectable: 5000 Unit(s) SubCutaneous (08-30 @ 18:17)  metoprolol succinate ER: 50 milliGRAM(s) Oral (08-30 @ 05:43)  piperacillin/tazobactam IVPB..: 25 mL/Hr IV Intermittent (08-30 @ 14:55)    Data:    T(C): 37.3  HR: 120  BP: 124/84  RR: 18  SpO2: 100%    -WBC 17.51 / HgB 8.7 / Hct 27.1 / Plt 390  -Na 131 / Cl 97 / BUN 8 / Glucose 106  -K 3.8 / CO2 26 / Cr 0.52  -ALT -- / Alk Phos -- / T.Bili --  -INR 1.40 / PTT 24.8      Radiology:   Imaging reviewed     Assessment/Plan:   57y Male with hx rectal cancer sp radiation and colostomy, lower GIB, now sp APR w end colostomy creation. CTAP 8/30 demonstrating 6 x 3 cm abscess medial L upper thigh. IR consulted for aspiration +/- drainage.     -- IR will plan to perform aspiration +/- drainage 8/31  -- NPO at midnight on 8/30  -- hold a.m. anticoagulation on 8/31  -- AM labs 8/31: CBC, BMP, Coags   -- please place IR procedure request order under Dr. Tolbert

## 2022-08-30 NOTE — PROGRESS NOTE ADULT - SUBJECTIVE AND OBJECTIVE BOX
Plastic Surgery Daily Progress Note  =====================================================    SUBJECTIVE: Patient seen and examined at bedside on AM rounds. Patient reports that they're feeling well. Pt febrile to 100.9 overnight and tachycardic to 110s.    PMH:   APR w/ flap closure, VRAM flap    ALLERGIES:  Allergy Status Unknown  No Known Allergies  --------------------------------------------------------------------------------------    MEDICATIONS:    Neurologic Medications  acetaminophen     Tablet .. 650 milliGRAM(s) Oral every 6 hours PRN Temp greater or equal to 38C (100.4F)  gabapentin 600 milliGRAM(s) Oral <User Schedule>  gabapentin 300 milliGRAM(s) Oral <User Schedule>  HYDROmorphone   Tablet 8 milliGRAM(s) Oral every 6 hours PRN Moderate- Severe Pain (4 - 10)  HYDROmorphone  Injectable 1 milliGRAM(s) IV Push every 6 hours PRN Severe Breakthrough Pain (7 - 10)  morphine ER Tablet 100 milliGRAM(s) Oral every 12 hours  ondansetron Injectable 4 milliGRAM(s) IV Push every 6 hours PRN Nausea  zolpidem 5 milliGRAM(s) Oral at bedtime PRN Insomnia  zolpidem 5 milliGRAM(s) Oral at bedtime PRN Insomnia    Respiratory Medications    Cardiovascular Medications  amLODIPine   Tablet 5 milliGRAM(s) Oral daily  metoprolol succinate ER 50 milliGRAM(s) Oral daily    Gastrointestinal Medications    Genitourinary Medications    Hematologic/Oncologic Medications  heparin   Injectable 5000 Unit(s) SubCutaneous every 12 hours    Antimicrobial/Immunologic Medications  bictegravir 50 mG/emtricitabine 200 mG/tenofovir alafenamide 25 mG (BIKTARVY) 1 Tablet(s) Oral daily  piperacillin/tazobactam IVPB.. 3.375 Gram(s) IV Intermittent every 8 hours    Endocrine/Metabolic Medications    Topical/Other Medications  chlorhexidine 2% Cloths 1 Application(s) Topical daily  lidocaine   4% Patch 1 Patch Transdermal every 24 hours  naloxone Injectable 0.1 milliGRAM(s) IV Push every 3 minutes PRN For ANY of the following changes in patient status:  A. RR LESS THAN 10 breaths per minute, B. Oxygen saturation LESS THAN 90%, C. Sedation score of 6    --------------------------------------------------------------------------------------    VITAL SIGNS:  T(C): 37.4 (30 Aug 2022 09:57), Max: 38.3 (29 Aug 2022 21:05)  T(F): 99.4 (30 Aug 2022 09:57), Max: 100.9 (29 Aug 2022 21:05)  HR: 119 (30 Aug 2022 09:57) (112 - 119)  BP: 123/71 (30 Aug 2022 09:57) (122/78 - 150/99)  BP(mean): --  ABP: --  ABP(mean): --  RR: 18 (30 Aug 2022 09:57) (18 - 18)  SpO2: 100% (30 Aug 2022 09:57) (99% - 100%)    O2 Parameters below as of 30 Aug 2022 09:57  Patient On (Oxygen Delivery Method): room air    --------------------------------------------------------------------------------------    EXAM    -- CONSTITUTIONAL: NAD, lying in bed  -- NEURO: Awake, alert  -- PULM: Non-labored respirations  -- ABDOMEN: incision is c/d/i, no erythema, JPx2 to suction s/s output,   -- PERINEUM: flap warm, incisions intact, stable good color and cap refill, healthy appearing, area of darker skin on posterior surface from prior tattoo, dressing c/d/i  --: susan in      --------------------------------------------------------------------------------------    LABS                                   8.7    17.51 )-----------( 390      ( 30 Aug 2022 06:15 )             27.1   08-30    131<L>  |  97<L>  |  8   ----------------------------<  106<H>  3.8   |  26  |  0.52    Ca    8.5      30 Aug 2022 06:15  Phos  3.1     08-30  Mg     1.90     08-30    TPro  6.1  /  Alb  2.6<L>  /  TBili  0.2  /  DBili  x   /  AST  17  /  ALT  12  /  AlkPhos  80  08-29        --------------------------------------------------------------------------------------    INS AND OUTS:  I&O's Detail    29 Aug 2022 07:01  -  30 Aug 2022 07:00  --------------------------------------------------------  IN:  Total IN: 0 mL    OUT:    Bulb (mL): 17.5 mL    Bulb (mL): 10 mL    Colostomy (mL): 125 mL    Indwelling Catheter - Urethral (mL): 1600 mL  Total OUT: 1752.5 mL    Total NET: -1752.5 mL      30 Aug 2022 07:01  -  30 Aug 2022 14:56  --------------------------------------------------------  IN:  Total IN: 0 mL    OUT:    Indwelling Catheter - Urethral (mL): 1100 mL  Total OUT: 1100 mL    Total NET: -1100 mL    --------------------------------------------------------------------------------------

## 2022-08-30 NOTE — PROGRESS NOTE ADULT - SUBJECTIVE AND OBJECTIVE BOX
COLORECTAL SURGERY PROGRESS NOTE    SUBJECTIVE    OVERNIGHT EVENTS: Patient continues with consistent low grade temps and sinus tachycardia. Otherwise has no complaints, continues to tolerate a diet.     10-point review of systems completed and negative except as noted above.      OBJECTIVE    MEDICATIONS  acetaminophen     Tablet .. 650 milliGRAM(s) Oral every 6 hours PRN  amLODIPine   Tablet 5 milliGRAM(s) Oral daily  bictegravir 50 mG/emtricitabine 200 mG/tenofovir alafenamide 25 mG (BIKTARVY) 1 Tablet(s) Oral daily  chlorhexidine 2% Cloths 1 Application(s) Topical daily  gabapentin 600 milliGRAM(s) Oral <User Schedule>  gabapentin 300 milliGRAM(s) Oral <User Schedule>  heparin   Injectable 5000 Unit(s) SubCutaneous every 12 hours  HYDROmorphone   Tablet 8 milliGRAM(s) Oral every 6 hours PRN  HYDROmorphone  Injectable 1 milliGRAM(s) IV Push every 6 hours PRN  lidocaine   4% Patch 1 Patch Transdermal every 24 hours  metoprolol succinate ER 50 milliGRAM(s) Oral daily  morphine ER Tablet 100 milliGRAM(s) Oral every 12 hours  naloxone Injectable 0.1 milliGRAM(s) IV Push every 3 minutes PRN  ondansetron Injectable 4 milliGRAM(s) IV Push every 6 hours PRN  piperacillin/tazobactam IVPB.. 3.375 Gram(s) IV Intermittent every 8 hours  zolpidem 5 milliGRAM(s) Oral at bedtime PRN  zolpidem 5 milliGRAM(s) Oral at bedtime PRN      PHYSICAL EXAM  Vital Signs Last 24 Hrs  T(C): 37.5 (30 Aug 2022 05:46), Max: 38.3 (29 Aug 2022 21:05)  T(F): 99.5 (30 Aug 2022 05:46), Max: 100.9 (29 Aug 2022 21:05)  HR: 115 (30 Aug 2022 05:46) (112 - 123)  BP: 127/68 (30 Aug 2022 05:46) (111/74 - 150/99)  RR: 18 (30 Aug 2022 05:46) (18 - 20)  SpO2: 99% (30 Aug 2022 05:46) (98% - 100%)    O2 Parameters below as of 30 Aug 2022 05:46  Patient On (Oxygen Delivery Method): room air      General Appearance: Appears well, NAD  Respiratory: No labored breathing  CV: Pulse regularly present  Abdomen: Soft, appropriately tender to palpation around the incision, Incision c/d/i, uncovered, Ostomy pink and viable, stool noted within bag      LABS                                   8.7    17.51 )-----------( 390      ( 30 Aug 2022 06:15 )             27.1   08-30    131<L>  |  97<L>  |  8   ----------------------------<  106<H>  3.8   |  26  |  0.52    Ca    8.5      30 Aug 2022 06:15  Phos  3.1     08-30  Mg     1.90     08-30    TPro  6.1  /  Alb  2.6<L>  /  TBili  0.2  /  DBili  x   /  AST  17  /  ALT  12  /  AlkPhos  80  08-29      RADIOLOGY & ADDITIONAL STUDIES

## 2022-08-31 LAB
ANION GAP SERPL CALC-SCNC: 11 MMOL/L — SIGNIFICANT CHANGE UP (ref 7–14)
APTT BLD: 28.4 SEC — SIGNIFICANT CHANGE UP (ref 27–36.3)
BUN SERPL-MCNC: 8 MG/DL — SIGNIFICANT CHANGE UP (ref 7–23)
CALCIUM SERPL-MCNC: 8.7 MG/DL — SIGNIFICANT CHANGE UP (ref 8.4–10.5)
CHLORIDE SERPL-SCNC: 99 MMOL/L — SIGNIFICANT CHANGE UP (ref 98–107)
CO2 SERPL-SCNC: 26 MMOL/L — SIGNIFICANT CHANGE UP (ref 22–31)
CREAT SERPL-MCNC: 0.59 MG/DL — SIGNIFICANT CHANGE UP (ref 0.5–1.3)
EGFR: 114 ML/MIN/1.73M2 — SIGNIFICANT CHANGE UP
GLUCOSE SERPL-MCNC: 108 MG/DL — HIGH (ref 70–99)
GRAM STN FLD: SIGNIFICANT CHANGE UP
HCT VFR BLD CALC: 25.7 % — LOW (ref 39–50)
HGB BLD-MCNC: 8.4 G/DL — LOW (ref 13–17)
INR BLD: 1.4 RATIO — HIGH (ref 0.88–1.16)
MAGNESIUM SERPL-MCNC: 2 MG/DL — SIGNIFICANT CHANGE UP (ref 1.6–2.6)
MCHC RBC-ENTMCNC: 28.7 PG — SIGNIFICANT CHANGE UP (ref 27–34)
MCHC RBC-ENTMCNC: 32.7 GM/DL — SIGNIFICANT CHANGE UP (ref 32–36)
MCV RBC AUTO: 87.7 FL — SIGNIFICANT CHANGE UP (ref 80–100)
NRBC # BLD: 0 /100 WBCS — SIGNIFICANT CHANGE UP (ref 0–0)
NRBC # FLD: 0 K/UL — SIGNIFICANT CHANGE UP (ref 0–0)
PHOSPHATE SERPL-MCNC: 3.4 MG/DL — SIGNIFICANT CHANGE UP (ref 2.5–4.5)
PLATELET # BLD AUTO: 413 K/UL — HIGH (ref 150–400)
POTASSIUM SERPL-MCNC: 3.8 MMOL/L — SIGNIFICANT CHANGE UP (ref 3.5–5.3)
POTASSIUM SERPL-SCNC: 3.8 MMOL/L — SIGNIFICANT CHANGE UP (ref 3.5–5.3)
PROTHROM AB SERPL-ACNC: 16.3 SEC — HIGH (ref 10.5–13.4)
RBC # BLD: 2.93 M/UL — LOW (ref 4.2–5.8)
RBC # FLD: 16 % — HIGH (ref 10.3–14.5)
SARS-COV-2 RNA SPEC QL NAA+PROBE: SIGNIFICANT CHANGE UP
SODIUM SERPL-SCNC: 136 MMOL/L — SIGNIFICANT CHANGE UP (ref 135–145)
SPECIMEN SOURCE: SIGNIFICANT CHANGE UP
WBC # BLD: 16.49 K/UL — HIGH (ref 3.8–10.5)
WBC # FLD AUTO: 16.49 K/UL — HIGH (ref 3.8–10.5)

## 2022-08-31 PROCEDURE — 10030 IMG GID FLU COLL DRG SFT TIS: CPT

## 2022-08-31 RX ORDER — POTASSIUM CHLORIDE 20 MEQ
10 PACKET (EA) ORAL
Refills: 0 | Status: COMPLETED | OUTPATIENT
Start: 2022-08-31 | End: 2022-08-31

## 2022-08-31 RX ORDER — HEPARIN SODIUM 5000 [USP'U]/ML
5000 INJECTION INTRAVENOUS; SUBCUTANEOUS EVERY 12 HOURS
Refills: 0 | Status: DISCONTINUED | OUTPATIENT
Start: 2022-08-31 | End: 2022-09-02

## 2022-08-31 RX ORDER — DEXTROSE MONOHYDRATE, SODIUM CHLORIDE, AND POTASSIUM CHLORIDE 50; .745; 4.5 G/1000ML; G/1000ML; G/1000ML
1000 INJECTION, SOLUTION INTRAVENOUS
Refills: 0 | Status: DISCONTINUED | OUTPATIENT
Start: 2022-08-31 | End: 2022-09-01

## 2022-08-31 RX ADMIN — Medication 50 MILLIGRAM(S): at 06:17

## 2022-08-31 RX ADMIN — DEXTROSE MONOHYDRATE, SODIUM CHLORIDE, AND POTASSIUM CHLORIDE 100 MILLILITER(S): 50; .745; 4.5 INJECTION, SOLUTION INTRAVENOUS at 15:34

## 2022-08-31 RX ADMIN — ZOLPIDEM TARTRATE 5 MILLIGRAM(S): 10 TABLET ORAL at 21:49

## 2022-08-31 RX ADMIN — HEPARIN SODIUM 5000 UNIT(S): 5000 INJECTION INTRAVENOUS; SUBCUTANEOUS at 17:17

## 2022-08-31 RX ADMIN — PIPERACILLIN AND TAZOBACTAM 25 GRAM(S): 4; .5 INJECTION, POWDER, LYOPHILIZED, FOR SOLUTION INTRAVENOUS at 15:35

## 2022-08-31 RX ADMIN — AMLODIPINE BESYLATE 5 MILLIGRAM(S): 2.5 TABLET ORAL at 06:17

## 2022-08-31 RX ADMIN — LIDOCAINE 1 PATCH: 4 CREAM TOPICAL at 08:52

## 2022-08-31 RX ADMIN — GABAPENTIN 300 MILLIGRAM(S): 400 CAPSULE ORAL at 21:49

## 2022-08-31 RX ADMIN — SODIUM CHLORIDE 100 MILLILITER(S): 9 INJECTION, SOLUTION INTRAVENOUS at 06:18

## 2022-08-31 RX ADMIN — MORPHINE SULFATE 100 MILLIGRAM(S): 50 CAPSULE, EXTENDED RELEASE ORAL at 13:50

## 2022-08-31 RX ADMIN — BICTEGRAVIR SODIUM, EMTRICITABINE, AND TENOFOVIR ALAFENAMIDE FUMARATE 1 TABLET(S): 30; 120; 15 TABLET ORAL at 13:50

## 2022-08-31 RX ADMIN — LIDOCAINE 1 PATCH: 4 CREAM TOPICAL at 21:48

## 2022-08-31 RX ADMIN — Medication 100 MILLIEQUIVALENT(S): at 08:44

## 2022-08-31 RX ADMIN — GABAPENTIN 600 MILLIGRAM(S): 400 CAPSULE ORAL at 13:55

## 2022-08-31 RX ADMIN — Medication 100 MILLIEQUIVALENT(S): at 09:41

## 2022-08-31 RX ADMIN — ZOLPIDEM TARTRATE 5 MILLIGRAM(S): 10 TABLET ORAL at 02:22

## 2022-08-31 RX ADMIN — PIPERACILLIN AND TAZOBACTAM 25 GRAM(S): 4; .5 INJECTION, POWDER, LYOPHILIZED, FOR SOLUTION INTRAVENOUS at 06:22

## 2022-08-31 RX ADMIN — GABAPENTIN 300 MILLIGRAM(S): 400 CAPSULE ORAL at 06:17

## 2022-08-31 RX ADMIN — PIPERACILLIN AND TAZOBACTAM 25 GRAM(S): 4; .5 INJECTION, POWDER, LYOPHILIZED, FOR SOLUTION INTRAVENOUS at 22:32

## 2022-08-31 NOTE — DIETITIAN INITIAL EVALUATION ADULT - OTHER INFO
Per chart,    Nutrition interview: No recent episodes of nausea, vomiting, diarrhea or constipation, BM noted on ___ per RN flowsheets. Denies any chewing/swallowing difficulties. No food allergies. Stated UBW: ___. Food preferences explored and noted. Intake is __-__% per RN flowsheets and per pt. Feeding skills:  Per chart, 58 y/o Male with Hx rectal/prostate cancer s/p radiation and colostomy, lower GIB, now s/p APR with end colostomy creation, VRAM flap closure on 8/24    Nutrition interview: No recent episodes of nausea, vomiting, diarrhea or constipation, BM noted today per pt. Denies any chewing/swallowing difficulties. No food allergies. Stated UBW: ~240# x3 months, current weight taken today via bedscale by RD (8/31): 196#. Pt with significant, unintentional wt loss x3 months (-18.3%). Likely r/t poor appetite/intake at that time. Food preferences explored and noted. Intake is 100% per pt. Feeding skills: set up tray required from staff. Pt declined offered nutrition oral supplements.     Pt currently NPO at time of visit for IR procedure (drain to Left thigh for abscess).

## 2022-08-31 NOTE — PROGRESS NOTE ADULT - SUBJECTIVE AND OBJECTIVE BOX
COLORECTAL SURGERY PROGRESS NOTE    SUBJECTIVE  Patient seen and examined at bedside, resting comfortably, no complaints at this time. Feels a little nervous for procedure today, reassured and explained procedure to him.    10-point review of systems completed and negative except as noted above.      OBJECTIVE    PHYSICAL EXAM  Vital Signs Last 24 Hrs  T(C): 37.1 (22 @ 06:11), Max: 37.8 (22 @ 15:09)  HR: 111 (22 @ 06:11) (111 - 120)  BP: 133/75 (22 @ 06:11) (117/69 - 133/75)  BP(mean): --  ABP: --  ABP(mean): --  RR: 18 (22 @ 06:11) (18 - 18)  SpO2: 96% (22 @ 06:11) (96% - 100%)  Wt(kg): --  CVP(mm Hg): --  CI: --  CAPILLARY BLOOD GLUCOSE       N/A       @ 07:01  -   @ 07:00  --------------------------------------------------------  IN:    Oral Fluid: 240 mL  Total IN: 240 mL    OUT:    Bulb (mL): 7.5 mL    Bulb (mL): 7.5 mL    Colostomy (mL): 400 mL    Indwelling Catheter - Urethral (mL): 1400 mL    Voided (mL): 2000 mL  Total OUT: 3815 mL    Total NET: -3575 mL      EXAM  General Appearance: Appears well, NAD  Respiratory: No labored breathing  CV: Pulse regularly present  Abdomen: Soft, appropriately tender to palpation around the incision, Incision c/d/i, uncovered, Ostomy pink and viable, stool noted within bag      LABS      CBC ( @ 05:50)                              8.4<L>                         16.49<H>  )----------------(  413<H>     --    % Neutrophils, --    % Lymphocytes, ANC: --                                  25.7<L>  CBC ( @ 06:15)                              8.7<L>                         17.51<H>  )----------------(  390        --    % Neutrophils, --    % Lymphocytes, ANC: --                                  27.1<L>    BMP ( @ 05:50)             136     |  99      |  8     		Ca++ --      Ca 8.7                ---------------------------------( 108<H>		Mg 2.00               3.8     |  26      |  0.59  			Ph 3.4     BMP ( @ 06:15)             131<L>  |  97<L>   |  8     		Ca++ --      Ca 8.5                ---------------------------------( 106<H>		Mg 1.90               3.8     |  26      |  0.52  			Ph 3.1         Coags ( @ 05:50)  aPTT 28.4 / INR 1.40<H> / PT 16.3<H>          Urinalysis ( @ 09:15):     Color: Light Yellow / Appearance: Slightly Turbid<!> / S.015 / pH: 7.0 / Gluc: 100 mg/dL<!> / Ketones: Negative / Bili: Negative / Urobili: <2 mg/dL / Protein :Trace<!> / Nitrites: Negative / Leuk.Est: Negative / RBC: 23<H> / WBC: 6<H> / Sq Epi:  / Non Sq Epi: 5 / Bacteria Few<!>       -> .Blood Blood Culture ( @ 17:30)     NG    NG    No growth to date.    -> .Blood Blood Culture ( @ 16:15)     NG    NG    No growth to date.    -> .Blood Blood-Venous Culture ( @ 06:28)     NG    NG    No growth to date.                        Assessment and Plan:   · Assessment	  57M with PMHx rectal/prostate cancer s/p radiation, hx of sigmoid colostomy in March with Dr. Avilez, s/p hospitalization and SICU admission for lower GIB, discharged , now s/p APR with end colostomy creation, VRAM flap closure on .    Plan:  - IR today for drainage of thigh collection  - Work with PT for OOB WITHOUT sitting per PRS recommendations  - Diet: NPO p mn for procedure, will resume LRD post procedure  - Monitor TALI drain output  - LR 100ml/hr  - Zosyn  - continue home BP and HIV medications  - DVT prophylaxis: SubQ Heparin    Team A  P# [id number]

## 2022-08-31 NOTE — DIETITIAN INITIAL EVALUATION ADULT - PERTINENT LABORATORY DATA
08-31    136  |  99  |  8   ----------------------------<  108<H>  3.8   |  26  |  0.59    Ca    8.7      31 Aug 2022 05:50  Phos  3.4     08-31  Mg     2.00     08-31    A1C with Estimated Average Glucose Result: 5.3 % (08-18-22 @ 06:05)  A1C with Estimated Average Glucose Result: 6.0 % (04-21-22 @ 15:21)  A1C with Estimated Average Glucose Result: 5.8 % (03-19-22 @ 05:34)   08-31 Na 136 mmol/L Glu 108 mg/dL<H> K+ 3.8 mmol/L Cr 0.59 mg/dL BUN 8 mg/dL Phos 3.4 mg/dL      A1C with Estimated Average Glucose Result: 5.3 % (08-18-22 @ 06:05)  A1C with Estimated Average Glucose Result: 6.0 % (04-21-22 @ 15:21)  A1C with Estimated Average Glucose Result: 5.8 % (03-19-22 @ 05:34)

## 2022-08-31 NOTE — PROGRESS NOTE ADULT - SUBJECTIVE AND OBJECTIVE BOX
Patient is a 57y old  Male who presents with a chief complaint of Malignant neoplasm of anus    Patient seen this afternoon after drainage of left thigh fluid collection by IR. He feels okay, with occasional abdominal pain but overall feeling better.     MEDICATIONS  (STANDING):  amLODIPine   Tablet 5 milliGRAM(s) Oral daily  bictegravir 50 mG/emtricitabine 200 mG/tenofovir alafenamide 25 mG (BIKTARVY) 1 Tablet(s) Oral daily  chlorhexidine 2% Cloths 1 Application(s) Topical daily  gabapentin 600 milliGRAM(s) Oral <User Schedule>  gabapentin 300 milliGRAM(s) Oral <User Schedule>  heparin   Injectable 5000 Unit(s) SubCutaneous every 12 hours  lactated ringers. 1000 milliLiter(s) (100 mL/Hr) IV Continuous <Continuous>  lidocaine   4% Patch 1 Patch Transdermal every 24 hours  metoprolol succinate ER 50 milliGRAM(s) Oral daily  morphine ER Tablet 100 milliGRAM(s) Oral every 12 hours  piperacillin/tazobactam IVPB.. 3.375 Gram(s) IV Intermittent every 8 hours    MEDICATIONS  (PRN):  acetaminophen     Tablet .. 650 milliGRAM(s) Oral every 6 hours PRN Temp greater or equal to 38C (100.4F)  HYDROmorphone   Tablet 8 milliGRAM(s) Oral every 6 hours PRN Moderate- Severe Pain (4 - 10)  HYDROmorphone  Injectable 1 milliGRAM(s) IV Push every 6 hours PRN Severe Breakthrough Pain (7 - 10)  naloxone Injectable 0.1 milliGRAM(s) IV Push every 3 minutes PRN For ANY of the following changes in patient status:  A. RR LESS THAN 10 breaths per minute, B. Oxygen saturation LESS THAN 90%, C. Sedation score of 6  ondansetron Injectable 4 milliGRAM(s) IV Push every 6 hours PRN Nausea  zolpidem 5 milliGRAM(s) Oral at bedtime PRN Insomnia  zolpidem 5 milliGRAM(s) Oral at bedtime PRN Insomnia        Vital Signs Last 24 Hrs  T(C): 37.2 (31 Aug 2022 14:16), Max: 37.8 (30 Aug 2022 15:09)  T(F): 99 (31 Aug 2022 14:16), Max: 100 (30 Aug 2022 15:09)  HR: 107 (31 Aug 2022 14:16) (107 - 120)  BP: 118/74 (31 Aug 2022 14:16) (117/69 - 133/75)  BP(mean): --  RR: 17 (31 Aug 2022 14:16) (17 - 18)  SpO2: 97% (31 Aug 2022 14:16) (96% - 100%)    Parameters below as of 31 Aug 2022 14:16  Patient On (Oxygen Delivery Method): room air        PE  NAD  Awake, alert  Anicteric, MMM  No c/c/e  No rash grossly                          8.4    16.49 )-----------( 413      ( 31 Aug 2022 05:50 )             25.7       08-31    136  |  99  |  8   ----------------------------<  108<H>  3.8   |  26  |  0.59    Ca    8.7      31 Aug 2022 05:50  Phos  3.4     08-31  Mg     2.00     08-31      ACC: [id number] EXAM:  CT ABDOMEN AND PELVIS                         ACC: [id number] [facility]                          PROCEDURE DATE:  08/30/2022          INTERPRETATION:  CLINICAL INFORMATION: Tachycardia. History of anal and   prostate cancer. Status post abdominoperineal resection with end   colostomy creation, vertical rectus abdominis musculocutaneous flap   closure on 8/24.      COMPARISON: CT chest 11/15/2021, CT abdomen and pelvis 7/10/2022    CONTRAST/COMPLICATIONS:  IV Contrast: 66 cc of Omnipaque 350 were administered, 4 cc were discarded  Oral Contrast: NONE  Complications: None reported at time of study completion    PROCEDURE:  CT Angiography of the Chest was performed followed by portal venous phase   imaging of the Abdomen and Pelvis.  Sagittal and coronal reformats were performed as well as 3D (MIP)   reconstructions.    FINDINGS:  CHEST:  LUNGS AND LARGE AIRWAYS: Mucus impacted peripheral right upper lobe   airway. Right middle lobe, lingular, and bilateral lower lobe   bronchiolectasis. Lung parenchymal mosaicism. No focal consolidation.  PLEURA: No pleural effusion.  VESSELS: Limited evaluation for detection of pulmonary embolism due to   suboptimal contrast opacification (main pulmonary artery HU is 111). No   saddle embolus. Lobar/interlobar and more distal pulmonary arterial   branches are not adequately assessed.  HEART: Heart size is normal. No pericardial effusion.  MEDIASTINUM AND SEAN: No lymphadenopathy.  CHEST WALL AND LOWER NECK: Bilateral gynecomastia.    ABDOMEN AND PELVIS:  LIVER: Within normal limits.  BILE DUCTS: Normal caliber.  GALLBLADDER: Underdistended  SPLEEN: Within normal limits.  PANCREAS: Within normal limits.  ADRENALS: Within normal limits.  KIDNEYS/URETERS: Mild bilateral hydronephrosis, new since 7/10/2020.   Multiple bilateral renal cysts and subcentimeter hypodensities too small   to characterize, but unchanged.    BLADDER: Collapsed with Sahu in place.  REPRODUCTIVE ORGANS: Prostate is not well evaluated.    BOWEL: Status post abdominoperineal resection with end colostomy   creation, vertical rectus abdominis musculocutaneous flap closure.  No   bowel obstruction.  PERITONEUM: Small focus of free intraperitoneal air (), likely   postoperative changes . Fluid and air in the right and left periranal   space extending into the medial left thigh was present on prior study.   Collection in the medial left thigh measuring 6 x 3 cm (image 180, series   10). Rounded area with air in the mid pelvis () likely reflecting   postoperative changes. Drain terminating in mid pelvis.  VESSELS: IVC filter. Atherosclerotic changes.  RETROPERITONEUM/LYMPH NODES: No lymphadenopathy.  ABDOMINAL WALL: Postoperative changes.  BONES: Within normal limits.    IMPRESSION:    No saddle pulmonary embolism. Lobar/interlobar and more distal pulmonary   arterial branches are not adequately assessed due to suboptimal contrast   opacification.    Mild bilateral hydronephrosis.    6 x 3 cm fluid and gas collection in the medial and upper left thigh;   infectious etiology cannot be excluded.    Postoperative changes in the abdomen and pelvis as described above.        --- End of Report ---

## 2022-08-31 NOTE — PROGRESS NOTE ADULT - ASSESSMENT
1. Prostate cancer with history of recurrence   - Receives Lupron q3 months, last dose 07/13/22  - PSA undetectable since 10/2021   - Continued management with Dr. Mathews after discharge     2. Anorectal squamous cell ca, lower GI bleed   - Dx 2021, treated with RT and mitomycin/xeloda  - s/p APR 08/24  - Continued management per CRS  - Follow up with oncologist after discharge     3. Left upper thigh abscess  - s/p drainage by IR today, f/u cultures       Constance Brush PA-C  Hematology/Oncology  [facility]  [phone] (office)  [phone] (alt office)  Evenings and weekends please call MD on call or office

## 2022-08-31 NOTE — PRE PROCEDURE NOTE - PRE PROCEDURE EVALUATION
Interventional Radiology Pre-Procedure Note    This is a 57y  Male with symptomatic left thigh abscess    HPI:  57M with Hx rectal/prostate cancer s/p radiation, hx of sigmoid colostomy in March with Dr. Avilez presented to [facility] ED on 8/16/22 with lower GI bleed that began at 9pm 8/15. Patient was transferred from [facility], s/p 2 unit PRBC. MTP initiated, amitted to colorectal surgery service and transferred to SICU for hemorrhage watch. Patient discharged from hospital and now presents to [facility] for scheduled abdominal peritoneal resection with Dr. Betancourt.  Patient in usual state of health per himself and wife at bedside and has no complaints at this time.     (24 Aug 2022 12:51)      PAST MEDICAL & SURGICAL HISTORY:  HTN (hypertension)      HLD (hyperlipidemia)      HIV infection      Depressive disorder      Anxiety      Prostate cancer      Anal cancer      Diverticulosis      Anal lesion          Social History:     FAMILY HISTORY:  No pertinent family history in first degree relatives        Allergies: Allergy Status Unknown  No Known Allergies      Current Medications: acetaminophen     Tablet .. 650 milliGRAM(s) Oral every 6 hours PRN  amLODIPine   Tablet 5 milliGRAM(s) Oral daily  bictegravir 50 mG/emtricitabine 200 mG/tenofovir alafenamide 25 mG (BIKTARVY) 1 Tablet(s) Oral daily  chlorhexidine 2% Cloths 1 Application(s) Topical daily  gabapentin 600 milliGRAM(s) Oral <User Schedule>  gabapentin 300 milliGRAM(s) Oral <User Schedule>  HYDROmorphone   Tablet 8 milliGRAM(s) Oral every 6 hours PRN  HYDROmorphone  Injectable 1 milliGRAM(s) IV Push every 6 hours PRN  lactated ringers. 1000 milliLiter(s) IV Continuous <Continuous>  lidocaine   4% Patch 1 Patch Transdermal every 24 hours  metoprolol succinate ER 50 milliGRAM(s) Oral daily  morphine ER Tablet 100 milliGRAM(s) Oral every 12 hours  naloxone Injectable 0.1 milliGRAM(s) IV Push every 3 minutes PRN  ondansetron Injectable 4 milliGRAM(s) IV Push every 6 hours PRN  piperacillin/tazobactam IVPB.. 3.375 Gram(s) IV Intermittent every 8 hours  zolpidem 5 milliGRAM(s) Oral at bedtime PRN  zolpidem 5 milliGRAM(s) Oral at bedtime PRN      Labs:                         8.4    16.49 )-----------( 413      ( 31 Aug 2022 05:50 )             25.7       08-31    136  |  99  |  8   ----------------------------<  108<H>  3.8   |  26  |  0.59    Ca    8.7      31 Aug 2022 05:50  Phos  3.4     08-31  Mg     2.00     08-31        Assessment/Plan:   This is a 57y  Male  presents with left thigh abscess   Plan is for drainage  Procedure/ risks/ benefits/ goals/ alternatives were explained. All questions answered. Informed content obtained from patient. Consent placed in chart.

## 2022-08-31 NOTE — DIETITIAN INITIAL EVALUATION ADULT - PERTINENT MEDS FT
MEDICATIONS  (STANDING):  amLODIPine   Tablet 5 milliGRAM(s) Oral daily  bictegravir 50 mG/emtricitabine 200 mG/tenofovir alafenamide 25 mG (BIKTARVY) 1 Tablet(s) Oral daily  chlorhexidine 2% Cloths 1 Application(s) Topical daily  gabapentin 600 milliGRAM(s) Oral <User Schedule>  gabapentin 300 milliGRAM(s) Oral <User Schedule>  lactated ringers. 1000 milliLiter(s) (100 mL/Hr) IV Continuous <Continuous>  lidocaine   4% Patch 1 Patch Transdermal every 24 hours  metoprolol succinate ER 50 milliGRAM(s) Oral daily  morphine ER Tablet 100 milliGRAM(s) Oral every 12 hours  piperacillin/tazobactam IVPB.. 3.375 Gram(s) IV Intermittent every 8 hours    MEDICATIONS  (PRN):  acetaminophen     Tablet .. 650 milliGRAM(s) Oral every 6 hours PRN Temp greater or equal to 38C (100.4F)  HYDROmorphone   Tablet 8 milliGRAM(s) Oral every 6 hours PRN Moderate- Severe Pain (4 - 10)  HYDROmorphone  Injectable 1 milliGRAM(s) IV Push every 6 hours PRN Severe Breakthrough Pain (7 - 10)  naloxone Injectable 0.1 milliGRAM(s) IV Push every 3 minutes PRN For ANY of the following changes in patient status:  A. RR LESS THAN 10 breaths per minute, B. Oxygen saturation LESS THAN 90%, C. Sedation score of 6  ondansetron Injectable 4 milliGRAM(s) IV Push every 6 hours PRN Nausea  zolpidem 5 milliGRAM(s) Oral at bedtime PRN Insomnia  zolpidem 5 milliGRAM(s) Oral at bedtime PRN Insomnia

## 2022-08-31 NOTE — DIETITIAN INITIAL EVALUATION ADULT - ORAL INTAKE PTA/DIET HISTORY
Pt lives at home with his wife. They cook together at home. No difficulty obtaining groceries. No specific diet followed PTA. Was taking Multivitamin once daily PTA

## 2022-08-31 NOTE — DIETITIAN INITIAL EVALUATION ADULT - NSFNSGIIOFT_GEN_A_CORE
08-30-22 @ 07:01  -  08-31-22 @ 07:00  --------------------------------------------------------  OUT:    Colostomy (mL): 400 mL  Total OUT: 400 mL    Total NET: -400 mL      08-31-22 @ 07:01  -  08-31-22 @ 11:48  --------------------------------------------------------  OUT:    Colostomy (mL): 0 mL  Total OUT: 0 mL    Total NET: 0 mL

## 2022-08-31 NOTE — PROGRESS NOTE ADULT - NS ATTEND AMEND GEN_ALL_CORE FT
IR drainage today.
58 y/o male with history of prostate cancer as well as anorectal cancer, admitted for scheduled APR.    - On ADT for recurrent prostate cancer. Last PSA in July 2022 undetectable.  - Patient previously followed at [facility] for anal cancer. Follow-up on pathology from surgery.

## 2022-08-31 NOTE — PROGRESS NOTE ADULT - ASSESSMENT
57y Male with Hx rectal/prostate cancer s/p radiation and colostomy, lower GIB, now s/p APR with end colostomy creation, VRAM flap closure on 8/24    - flap healthy-appearing  - bacitracin, xeroform, abd pads over flap  - JPx2 suction, drain care  - NO SITTING- patient may lay on back or stand  - assistance with ambulating and moving from laying to standing  - f/u fever workup; f/u CT A/P and PE r/o results  -okay to remove davis, but patient cannot sit to urinate  -F/U IR drainage procedure today  -appreciate great care from primary team    Maximino Frazier  PRS  [id number]

## 2022-08-31 NOTE — DIETITIAN INITIAL EVALUATION ADULT - ADD RECOMMEND
1) Resume regular, low fiber diet when medically feasible   2) Encourage PO intake and honor food preferences as able.  3) Continue to Monitor weights, labs, BM's, skin integrity, p.o. intake.   4) RD to f/u prn.

## 2022-08-31 NOTE — PROGRESS NOTE ADULT - SUBJECTIVE AND OBJECTIVE BOX
Plastic Surgery Daily Progress Note  =====================================================    SUBJECTIVE: Patient seen and examined at bedside on AM rounds. Patient reports that they're feeling well. No fevers for last 24 hours.    PMH:   APR w/ flap closure, VRAM flap    ALLERGIES:  Allergy Status Unknown  No Known Allergies  --------------------------------------------------------------------------------------    MEDICATIONS:    Neurologic Medications  acetaminophen     Tablet .. 650 milliGRAM(s) Oral every 6 hours PRN Temp greater or equal to 38C (100.4F)  gabapentin 600 milliGRAM(s) Oral <User Schedule>  gabapentin 300 milliGRAM(s) Oral <User Schedule>  HYDROmorphone   Tablet 8 milliGRAM(s) Oral every 6 hours PRN Moderate- Severe Pain (4 - 10)  HYDROmorphone  Injectable 1 milliGRAM(s) IV Push every 6 hours PRN Severe Breakthrough Pain (7 - 10)  morphine ER Tablet 100 milliGRAM(s) Oral every 12 hours  ondansetron Injectable 4 milliGRAM(s) IV Push every 6 hours PRN Nausea  zolpidem 5 milliGRAM(s) Oral at bedtime PRN Insomnia  zolpidem 5 milliGRAM(s) Oral at bedtime PRN Insomnia    Respiratory Medications    Cardiovascular Medications  amLODIPine   Tablet 5 milliGRAM(s) Oral daily  metoprolol succinate ER 50 milliGRAM(s) Oral daily    Gastrointestinal Medications    Genitourinary Medications    Hematologic/Oncologic Medications  heparin   Injectable 5000 Unit(s) SubCutaneous every 12 hours    Antimicrobial/Immunologic Medications  bictegravir 50 mG/emtricitabine 200 mG/tenofovir alafenamide 25 mG (BIKTARVY) 1 Tablet(s) Oral daily  piperacillin/tazobactam IVPB.. 3.375 Gram(s) IV Intermittent every 8 hours    Endocrine/Metabolic Medications    Topical/Other Medications  chlorhexidine 2% Cloths 1 Application(s) Topical daily  lidocaine   4% Patch 1 Patch Transdermal every 24 hours  naloxone Injectable 0.1 milliGRAM(s) IV Push every 3 minutes PRN For ANY of the following changes in patient status:  A. RR LESS THAN 10 breaths per minute, B. Oxygen saturation LESS THAN 90%, C. Sedation score of 6    --------------------------------------------------------------------------------------    VITAL SIGNS:  ICU Vital Signs Last 24 Hrs  T(C): 37 (31 Aug 2022 10:14), Max: 37.8 (30 Aug 2022 15:09)  T(F): 98.6 (31 Aug 2022 10:14), Max: 100 (30 Aug 2022 15:09)  HR: 109 (31 Aug 2022 10:14) (109 - 120)  BP: 121/84 (31 Aug 2022 10:14) (117/69 - 133/75)  BP(mean): --  ABP: --  ABP(mean): --  RR: 18 (31 Aug 2022 10:14) (18 - 18)  SpO2: 97% (31 Aug 2022 10:14) (96% - 100%)    O2 Parameters below as of 31 Aug 2022 10:14  Patient On (Oxygen Delivery Method): room air    --------------------------------------------------------------------------------------    EXAM    -- CONSTITUTIONAL: NAD, lying in bed  -- NEURO: Awake, alert  -- PULM: Non-labored respirations  -- ABDOMEN: incision is c/d/i, no erythema, JPx2 to suction s/s output,   -- PERINEUM: flap warm, incisions intact, stable good color and cap refill, healthy appearing, area of darker skin on posterior surface from prior tattoo, dressing c/d/i      --------------------------------------------------------------------------------------    LABS                                   8.7    17.51 )-----------( 390      ( 30 Aug 2022 06:15 )             27.1   08-30    131<L>  |  97<L>  |  8   ----------------------------<  106<H>  3.8   |  26  |  0.52    Ca    8.5      30 Aug 2022 06:15  Phos  3.1     08-30  Mg     1.90     08-30    TPro  6.1  /  Alb  2.6<L>  /  TBili  0.2  /  DBili  x   /  AST  17  /  ALT  12  /  AlkPhos  80  08-29        --------------------------------------------------------------------------------------    INS AND OUTS:  I&O's Detail    29 Aug 2022 07:01  -  30 Aug 2022 07:00  --------------------------------------------------------  IN:  Total IN: 0 mL    OUT:    Bulb (mL): 17.5 mL    Bulb (mL): 10 mL    Colostomy (mL): 125 mL    Indwelling Catheter - Urethral (mL): 1600 mL  Total OUT: 1752.5 mL    Total NET: -1752.5 mL      30 Aug 2022 07:01  -  30 Aug 2022 14:56  --------------------------------------------------------  IN:  Total IN: 0 mL    OUT:    Indwelling Catheter - Urethral (mL): 1100 mL  Total OUT: 1100 mL    Total NET: -1100 mL    --------------------------------------------------------------------------------------

## 2022-08-31 NOTE — CHART NOTE - NSCHARTNOTEFT_GEN_A_CORE
A team surgery Post Procedure check    Patient s/p IR drainage of left thigh collection - 60mL purulent fluid, drain left in place, cultures sent    Patient seen and examined at bedside. Patient resting comfortably, is happy his procedure is complete, denies pain, N/V, or any other complaints at this time    Vitals  T(C): 37.2 (22 @ 14:16), Max: 37.8 (22 @ 15:09)  HR: 107 (22 @ 14:16) (107 - 120)  BP: 118/74 (22 @ 14:16) (117/69 - 133/75)  BP(mean): --  ABP: --  ABP(mean): --  RR: 17 (22 @ 14:16) (17 - 18)  SpO2: 97% (22 @ 14:16) (96% - 100%)  Wt(kg): --  CVP(mm Hg): --  CI: --  CAPILLARY BLOOD GLUCOSE       N/A       @ 07:01  -   @ 07:00  --------------------------------------------------------  IN:    Lactated Ringers: 1100 mL    Oral Fluid: 240 mL  Total IN: 1340 mL    OUT:    Bulb (mL): 7.5 mL    Bulb (mL): 7.5 mL    Colostomy (mL): 400 mL    Indwelling Catheter - Urethral (mL): 1400 mL    Voided (mL): 2000 mL  Total OUT: 3815 mL    Total NET: -2475 mL       @ 07:01  -   @ 14:35  --------------------------------------------------------  IN:  Total IN: 0 mL    OUT:    Bulb (mL): 8 mL    Bulb (mL): 25 mL    Bulb (mL): 8 mL    Colostomy (mL): 0 mL    Voided (mL): 350 mL  Total OUT: 391 mL    Total NET: -391 mL      EXAM  Gen: NAD  Abd: soft NT ND ostomy pink   Ext: IR drain on left posterior thigh with serosanguinous output    Labs  CBC ( @ 05:50)                              8.4<L>                         16.49<H>  )----------------(  413<H>     --    % Neutrophils, --    % Lymphocytes, ANC: --                                  25.7<L>  CBC ( @ 06:15)                              8.7<L>                         17.51<H>  )----------------(  390        --    % Neutrophils, --    % Lymphocytes, ANC: --                                  27.1<L>    BMP ( @ 05:50)             136     |  99      |  8     		Ca++ --      Ca 8.7                ---------------------------------( 108<H>		Mg 2.00               3.8     |  26      |  0.59  			Ph 3.4     BMP ( @ 06:15)             131<L>  |  97<L>   |  8     		Ca++ --      Ca 8.5                ---------------------------------( 106<H>		Mg 1.90               3.8     |  26      |  0.52  			Ph 3.1         Coags ( @ 05:50)  aPTT 28.4 / INR 1.40<H> / PT 16.3<H>          Urinalysis ( @ 09:15):     Color: Light Yellow / Appearance: Slightly Turbid<!> / S.015 / pH: 7.0 / Gluc: 100 mg/dL<!> / Ketones: Negative / Bili: Negative / Urobili: <2 mg/dL / Protein :Trace<!> / Nitrites: Negative / Leuk.Est: Negative / RBC: 23<H> / WBC: 6<H> / Sq Epi:  / Non Sq Epi: 5 / Bacteria Few<!>       -> .Blood Blood Culture ( @ 17:30)     NG    NG    No growth to date.    -> .Blood Blood Culture ( @ 16:15)     NG    NG    No growth to date.    -> .Blood Blood-Venous Culture ( @ 06:28)     NG    NG    No growth to date.    57M with PMHx rectal/prostate cancer s/p radiation, hx of sigmoid colostomy in March with Dr. Avilez, s/p hospitalization and SICU admission for lower GIB, discharged , now s/p APR with end colostomy creation, VRAM flap closure on . s/p 60ml purulent fluid IR drainage of left thigh abscess on  and drain left in place    Plan:  - Work with PT for OOB WITHOUT sitting per PRS recommendations  - Diet: resume LRD  - Monitor TALI drain output  - follow up IR drain cultures  - LR 100ml/hr  - Zosyn  - continue home BP and HIV medications  - DVT prophylaxis: SubQ Heparin    Team A  P# [id number]

## 2022-08-31 NOTE — CHART NOTE - NSCHARTNOTEFT_GEN_A_CORE
Pre-Interventional Radiology Procedure Note    57yMale with left thigh abscess    Procedure: left thigh abscess drainage      Interventional Radiology Attending Physician: Dr. Tolbert    Ordering Attending Physician: Dr. Betancourt    PAST MEDICAL & SURGICAL HISTORY:  HTN (hypertension)  HLD (hyperlipidemia)  HIV infection  Depressive disorder  Anxiety  Prostate cancer  Anal cancer  Diverticulosis  Anal lesion      CBC ( @ 05:50)                              8.4<L>                         16.49<H>  )----------------(  413<H>     --    % Neutrophils, --    % Lymphocytes, ANC: --                                  25.7<L>  CBC ( @ 06:15)                              8.7<L>                         17.51<H>  )----------------(  390        --    % Neutrophils, --    % Lymphocytes, ANC: --                                  27.1<L>    BMP ( @ 05:50)             136     |  99      |  8     		Ca++ --      Ca 8.7                ---------------------------------( 108<H>		Mg 2.00               3.8     |  26      |  0.59  			Ph 3.4     BMP ( @ 06:15)             131<L>  |  97<L>   |  8     		Ca++ --      Ca 8.5                ---------------------------------( 106<H>		Mg 1.90               3.8     |  26      |  0.52  			Ph 3.1         Coags ( @ 05:50)  aPTT 28.4 / INR 1.40<H> / PT 16.3<H>          Urinalysis ( @ 09:15):     Color: Light Yellow / Appearance: Slightly Turbid<!> / S.015 / pH: 7.0 / Gluc: 100 mg/dL<!> / Ketones: Negative / Bili: Negative / Urobili: <2 mg/dL / Protein :Trace<!> / Nitrites: Negative / Leuk.Est: Negative / RBC: 23<H> / WBC: 6<H> / Sq Epi:  / Non Sq Epi: 5 / Bacteria Few<!>       -> .Blood Blood Culture ( @ 17:30)     NG    NG    No growth to date.    -> .Blood Blood Culture ( @ 16:15)     NG    NG    No growth to date.    -> .Blood Blood-Venous Culture ( @ 06:28)     NG    NG    No growth to date.        PT/INR - ( 31 Aug 2022 05:50 )   PT: 16.3 sec;   INR: 1.40 ratio         PTT - ( 31 Aug 2022 05:50 )  PTT:28.4 sec

## 2022-09-01 ENCOUNTER — TRANSCRIPTION ENCOUNTER (OUTPATIENT)
Age: 57
End: 2022-09-01

## 2022-09-01 LAB
ANION GAP SERPL CALC-SCNC: 10 MMOL/L — SIGNIFICANT CHANGE UP (ref 7–14)
BUN SERPL-MCNC: 8 MG/DL — SIGNIFICANT CHANGE UP (ref 7–23)
CALCIUM SERPL-MCNC: 8.5 MG/DL — SIGNIFICANT CHANGE UP (ref 8.4–10.5)
CHLORIDE SERPL-SCNC: 98 MMOL/L — SIGNIFICANT CHANGE UP (ref 98–107)
CO2 SERPL-SCNC: 26 MMOL/L — SIGNIFICANT CHANGE UP (ref 22–31)
CREAT SERPL-MCNC: 0.61 MG/DL — SIGNIFICANT CHANGE UP (ref 0.5–1.3)
CULTURE RESULTS: SIGNIFICANT CHANGE UP
CULTURE RESULTS: SIGNIFICANT CHANGE UP
EGFR: 113 ML/MIN/1.73M2 — SIGNIFICANT CHANGE UP
GLUCOSE SERPL-MCNC: 125 MG/DL — HIGH (ref 70–99)
HCT VFR BLD CALC: 25.9 % — LOW (ref 39–50)
HGB BLD-MCNC: 8.5 G/DL — LOW (ref 13–17)
MAGNESIUM SERPL-MCNC: 2 MG/DL — SIGNIFICANT CHANGE UP (ref 1.6–2.6)
MCHC RBC-ENTMCNC: 28.8 PG — SIGNIFICANT CHANGE UP (ref 27–34)
MCHC RBC-ENTMCNC: 32.8 GM/DL — SIGNIFICANT CHANGE UP (ref 32–36)
MCV RBC AUTO: 87.8 FL — SIGNIFICANT CHANGE UP (ref 80–100)
NRBC # BLD: 0 /100 WBCS — SIGNIFICANT CHANGE UP (ref 0–0)
NRBC # FLD: 0 K/UL — SIGNIFICANT CHANGE UP (ref 0–0)
PHOSPHATE SERPL-MCNC: 3.3 MG/DL — SIGNIFICANT CHANGE UP (ref 2.5–4.5)
PLATELET # BLD AUTO: 416 K/UL — HIGH (ref 150–400)
POTASSIUM SERPL-MCNC: 3.9 MMOL/L — SIGNIFICANT CHANGE UP (ref 3.5–5.3)
POTASSIUM SERPL-SCNC: 3.9 MMOL/L — SIGNIFICANT CHANGE UP (ref 3.5–5.3)
RBC # BLD: 2.95 M/UL — LOW (ref 4.2–5.8)
RBC # FLD: 16 % — HIGH (ref 10.3–14.5)
SODIUM SERPL-SCNC: 134 MMOL/L — LOW (ref 135–145)
SPECIMEN SOURCE: SIGNIFICANT CHANGE UP
SPECIMEN SOURCE: SIGNIFICANT CHANGE UP
WBC # BLD: 10.69 K/UL — HIGH (ref 3.8–10.5)
WBC # FLD AUTO: 10.69 K/UL — HIGH (ref 3.8–10.5)

## 2022-09-01 RX ORDER — POTASSIUM CHLORIDE 20 MEQ
10 PACKET (EA) ORAL ONCE
Refills: 0 | Status: COMPLETED | OUTPATIENT
Start: 2022-09-01 | End: 2022-09-01

## 2022-09-01 RX ADMIN — PIPERACILLIN AND TAZOBACTAM 25 GRAM(S): 4; .5 INJECTION, POWDER, LYOPHILIZED, FOR SOLUTION INTRAVENOUS at 15:41

## 2022-09-01 RX ADMIN — HYDROMORPHONE HYDROCHLORIDE 8 MILLIGRAM(S): 2 INJECTION INTRAMUSCULAR; INTRAVENOUS; SUBCUTANEOUS at 05:22

## 2022-09-01 RX ADMIN — GABAPENTIN 600 MILLIGRAM(S): 400 CAPSULE ORAL at 13:16

## 2022-09-01 RX ADMIN — MORPHINE SULFATE 100 MILLIGRAM(S): 50 CAPSULE, EXTENDED RELEASE ORAL at 09:50

## 2022-09-01 RX ADMIN — BICTEGRAVIR SODIUM, EMTRICITABINE, AND TENOFOVIR ALAFENAMIDE FUMARATE 1 TABLET(S): 30; 120; 15 TABLET ORAL at 13:15

## 2022-09-01 RX ADMIN — PIPERACILLIN AND TAZOBACTAM 25 GRAM(S): 4; .5 INJECTION, POWDER, LYOPHILIZED, FOR SOLUTION INTRAVENOUS at 06:14

## 2022-09-01 RX ADMIN — LIDOCAINE 1 PATCH: 4 CREAM TOPICAL at 06:23

## 2022-09-01 RX ADMIN — ZOLPIDEM TARTRATE 5 MILLIGRAM(S): 10 TABLET ORAL at 21:44

## 2022-09-01 RX ADMIN — GABAPENTIN 300 MILLIGRAM(S): 400 CAPSULE ORAL at 21:44

## 2022-09-01 RX ADMIN — Medication 50 MILLIGRAM(S): at 05:24

## 2022-09-01 RX ADMIN — HEPARIN SODIUM 5000 UNIT(S): 5000 INJECTION INTRAVENOUS; SUBCUTANEOUS at 18:43

## 2022-09-01 RX ADMIN — Medication 100 MILLIEQUIVALENT(S): at 09:50

## 2022-09-01 RX ADMIN — HYDROMORPHONE HYDROCHLORIDE 8 MILLIGRAM(S): 2 INJECTION INTRAMUSCULAR; INTRAVENOUS; SUBCUTANEOUS at 04:37

## 2022-09-01 RX ADMIN — GABAPENTIN 300 MILLIGRAM(S): 400 CAPSULE ORAL at 05:25

## 2022-09-01 RX ADMIN — HEPARIN SODIUM 5000 UNIT(S): 5000 INJECTION INTRAVENOUS; SUBCUTANEOUS at 05:24

## 2022-09-01 RX ADMIN — PIPERACILLIN AND TAZOBACTAM 25 GRAM(S): 4; .5 INJECTION, POWDER, LYOPHILIZED, FOR SOLUTION INTRAVENOUS at 22:39

## 2022-09-01 RX ADMIN — AMLODIPINE BESYLATE 5 MILLIGRAM(S): 2.5 TABLET ORAL at 05:24

## 2022-09-01 NOTE — DISCHARGE NOTE PROVIDER - PROVIDER TOKENS
PROVIDER:[TOKEN:[[id number]],FOLLOWUP:[1 week]],PROVIDER:[TOKEN:[[id number]],FOLLOWUP:[1 week]]

## 2022-09-01 NOTE — DISCHARGE NOTE PROVIDER - NSDCFUADDINST_GEN_ALL_CORE_FT
- bacitracin, xeroform, abd pads over flap daily  - NO SITTING- patient may lay on back or stand  - assistance with ambulating and moving from laying to standing - bacitracin, xeroform, abd pads over flap daily  - NO SITTING- patient may lay on back or stand until you follow up with Dr Riggs  - assistance with ambulating and moving from laying to standing  -Can used Sitz bath/shower to clean himself if discharging

## 2022-09-01 NOTE — DISCHARGE NOTE PROVIDER - NSDCCPCAREPLAN_GEN_ALL_CORE_FT
PRINCIPAL DISCHARGE DIAGNOSIS  Diagnosis: Rectal cancer  Assessment and Plan of Treatment: s/p APR with end colostomy creation, VRAM flap closure on 8/24  WOUND CARE:  Please keep incisions clean and dry. Please do not Scrub or rub incisions. DO NOT use lotion or powder on incisions.   Please follow up with Interventional radiology to have your drain evaluated one week from discharge.  Please call today, to schedule an appointment (for one week from discharge date) [phone].   Location is in [facility] on the second floor radiology Room 263. You can park at [employer] garage. Continue to empty and record the drain output daily as you have been taught.  BATHING: You may shower and/or sponge bathe. You may use warm soapy water in the shower and rinse, pat dry. NO baths no pools for 6 weeks.  ACTIVITY: No heavy lifting or straining. Otherwise, you may return to your usual level of physical activity.   Take Tylenol 500mg every 6 hours as needed for mild to moderate pain if your pain continues take oxycodone 5 mg. If you are taking narcotic pain (oxycodone) medication DO NOT drive a car, operate machinery or make important decisions.  DIET: Return to your usual diet.  NOTIFY YOUR SURGEON IF YOU HAVE: any bleeding that does not stop, any pus draining from your wound(s), any fever (over 100.4 F) persistent nausea/vomiting, or if your pain is not controlled on your discharge pain medications, unable to urinate.  Please follow up with your primary care physician in one week regarding your hospitalization, bring copies of your discharge paperwork.  Please follow up with your surgeon, Dr. Betancourt and Dr. Riggs       PRINCIPAL DISCHARGE DIAGNOSIS  Diagnosis: Rectal cancer  Assessment and Plan of Treatment: s/p APR with end colostomy creation, VRAM flap closure on 8/24  WOUND CARE:  Please keep incisions clean and dry. Please do not Scrub or rub incisions. DO NOT use lotion or powder on incisions.   -Please follow up with Interventional radiology to have your drain evaluated one week from discharge.  Please call today, to schedule an appointment (for one week from discharge date) [phone].   Location is in [facility] on the second floor radiology Room 263. You can park at [employer] garage. Continue to empty and record the drain output daily as you have been taught.  - Flush drain 5cc NS qd  - You Will need noncontrast CT + IR tube check once outputs is less than 10cc/24hr, can be arranged as outpatient follow-up. Outpatient IR office [phone]  BATHING: You may shower and/or sponge bathe. You may use warm soapy water in the shower and rinse, pat dry. NO baths no pools for 6 weeks.  ACTIVITY: No heavy lifting or straining. Otherwise, you may return to your usual level of physical activity.   Take Tylenol 500mg every 6 hours as needed for mild to moderate pain if your pain continues take oxycodone 5 mg. If you are taking narcotic pain (oxycodone) medication DO NOT drive a car, operate machinery or make important decisions.  DIET: Return to your usual diet.  NOTIFY YOUR SURGEON IF YOU HAVE: any bleeding that does not stop, any pus draining from your wound(s), any fever (over 100.4 F) persistent nausea/vomiting, or if your pain is not controlled on your discharge pain medications, unable to urinate.  Please follow up with your primary care physician in one week regarding your hospitalization, bring copies of your discharge paperwork.  Please follow up with your surgeon, Dr. Betancourt and Dr. Riggs       PRINCIPAL DISCHARGE DIAGNOSIS  Diagnosis: Rectal cancer  Assessment and Plan of Treatment: s/p APR with end colostomy creation, VRAM flap closure on 8/24  WOUND CARE:  Please keep incisions clean and dry. Please do not Scrub or rub incisions. DO NOT use lotion or powder on incisions.   -Please follow up with Interventional radiology to have your drain evaluated one week from discharge.  Please call today, to schedule an appointment (for one week from discharge date) [phone].   Location is in [facility] on the second floor radiology Room 263. You can park at [employer] garage. Continue to empty and record the drain output daily as you have been taught.  - Flush drain 5cc NS qd  - You Will need noncontrast CT + IR tube check once outputs is less than 10cc/24hr, can be arranged as outpatient follow-up. Outpatient IR office [phone]  BATHING: You may shower and/or sponge bathe. You may use warm soapy water in the shower and rinse, pat dry. NO baths no pools for 6 weeks.  ACTIVITY: No heavy lifting or straining. Otherwise, you may return to your usual level of physical activity.   Take Tylenol 500mg every 6 hours as needed for mild to moderate pain if your pain continues take oxycodone 5 mg. If you are taking narcotic pain (oxycodone) medication DO NOT drive a car, operate machinery or make important decisions.  DIET: Return to your usual diet.  NOTIFY YOUR SURGEON IF YOU HAVE: any bleeding that does not stop, any pus draining from your wound(s), any fever (over 100.4 F) persistent nausea/vomiting, or if your pain is not controlled on your discharge pain medications, unable to urinate.  Please follow up with your primary care physician in one week regarding your hospitalization, bring copies of your discharge paperwork.  Please follow up with your surgeon, Dr. Betancourt and Dr. Riggs      SECONDARY DISCHARGE DIAGNOSES  Diagnosis: Prostate cancer  Assessment and Plan of Treatment: Continued management with Dr. Mathews after discharge

## 2022-09-01 NOTE — DISCHARGE NOTE PROVIDER - NSDCMRMEDTOKEN_GEN_ALL_CORE_FT
acetaminophen 325 mg oral tablet: 3 tab(s) orally every 6 hours MDD:4 grams as needed for moderate  acetaminophen 325 mg oral tablet: 2 tab(s) orally every 6 hours, As needed, Temp greater or equal to 38C (100.4F), Mild Pain (1 - 3)  ALPRAZolam 0.5 mg oral tablet: 1 tab(s) orally 3 times a day, As Needed  ALPRAZolam 1 mg oral tablet: 1 tab(s) orally once a day, As needed, anxiety  amLODIPine 5 mg oral tablet: 1 tab(s) orally once a day  aspirin 81 mg oral tablet: orally once a day  atorvastatin 10 mg oral tablet: 1 tab(s) orally once a day  atorvastatin 10 mg oral tablet: 1 tab(s) orally once a day  bictegravir/emtricitabine/tenofovir 50 mg-200 mg-25 mg oral tablet: 1 tab(s) orally once a day  Biktarvy 30 mg-120 mg-15 mg oral tablet: 1 tab(s) orally once a day  gabapentin 300 mg oral capsule: 1 cap(s) orally 3 times a day   HYDROmorphone 4 mg oral tablet: 1 tab orally every 4 hours, As needed, Moderate Pain and/or severe pain  ISTOP [id number] MDD:6 tabs  HYDROmorphone 8 mg oral tablet: 1 tab(s) orally every 4 hours, As Needed  metFORMIN:   metoprolol succinate 50 mg oral tablet, extended release: 1 tab(s) orally once a day  metoprolol succinate 50 mg oral tablet, extended release: 1 tab(s) orally once a day  MS Contin 30 mg oral tablet, extended release: 3 tabs orally every 8 hours   ISTOP [id number] MDD:9 tabs  PRICE CHECK [id number]  OLANZapine 10 mg oral tablet: 1 tab(s) orally once a day (at bedtime)  OLANZapine 5 mg oral tablet: 1 tab(s) orally once a day  polyethylene glycol 3350 oral powder for reconstitution: 17 gram(s) orally 2 times a day  senna oral tablet: 2 tab(s) orally once a day (at bedtime)  zolpidem 10 mg oral tablet: 1 tab(s) orally once a day (at bedtime)  zolpidem 10 mg oral tablet: 1 tab(s) orally once a day (at bedtime)   acetaminophen 325 mg oral tablet: 3 tab(s) orally every 6 hours MDD:4 grams as needed for moderate  acetaminophen 325 mg oral tablet: 2 tab(s) orally every 6 hours, As needed, Temp greater or equal to 38C (100.4F), Mild Pain (1 - 3)  ALPRAZolam 0.5 mg oral tablet: 1 tab(s) orally 3 times a day, As Needed  ALPRAZolam 1 mg oral tablet: 1 tab(s) orally once a day, As needed, anxiety  amLODIPine 5 mg oral tablet: 1 tab(s) orally once a day  amoxicillin-clavulanate 875 mg-125 mg oral tablet: 1 tab(s) orally every 12 hours MDD:2  aspirin 81 mg oral tablet: orally once a day  atorvastatin 10 mg oral tablet: 1 tab(s) orally once a day  atorvastatin 10 mg oral tablet: 1 tab(s) orally once a day  bictegravir/emtricitabine/tenofovir 50 mg-200 mg-25 mg oral tablet: 1 tab(s) orally once a day  Biktarvy 30 mg-120 mg-15 mg oral tablet: 1 tab(s) orally once a day  gabapentin 300 mg oral capsule: 1 cap(s) orally 3 times a day   HYDROmorphone 4 mg oral tablet: 1 tab orally every 4 hours, As needed, Moderate Pain and/or severe pain  ISTOP [id number] MDD:6 tabs  HYDROmorphone 8 mg oral tablet: 1 tab(s) orally every 4 hours, As Needed  metFORMIN:   metoprolol succinate 50 mg oral tablet, extended release: 1 tab(s) orally once a day  metoprolol succinate 50 mg oral tablet, extended release: 1 tab(s) orally once a day  MS Contin 30 mg oral tablet, extended release: 3 tabs orally every 8 hours   ISTOP [id number] MDD:9 tabs  PRICE CHECK [id number]  OLANZapine 10 mg oral tablet: 1 tab(s) orally once a day (at bedtime)  OLANZapine 5 mg oral tablet: 1 tab(s) orally once a day  polyethylene glycol 3350 oral powder for reconstitution: 17 gram(s) orally 2 times a day  senna oral tablet: 2 tab(s) orally once a day (at bedtime)  zolpidem 10 mg oral tablet: 1 tab(s) orally once a day (at bedtime)  zolpidem 10 mg oral tablet: 1 tab(s) orally once a day (at bedtime)   acetaminophen 325 mg oral tablet: 3 tab(s) orally every 6 hours MDD:4 grams as needed for moderate  ALPRAZolam 0.5 mg oral tablet: 1 tab(s) orally 3 times a day, As Needed  ALPRAZolam 1 mg oral tablet: 1 tab(s) orally once a day, As needed, anxiety  amLODIPine 5 mg oral tablet: 1 tab(s) orally once a day  amoxicillin-clavulanate 875 mg-125 mg oral tablet: 1 tab(s) orally every 12 hours MDD:2  aspirin 81 mg oral tablet: orally once a day  atorvastatin 10 mg oral tablet: 1 tab(s) orally once a day  bictegravir/emtricitabine/tenofovir 50 mg-200 mg-25 mg oral tablet: 1 tab(s) orally once a day  Biktarvy 30 mg-120 mg-15 mg oral tablet: 1 tab(s) orally once a day  gabapentin 300 mg oral capsule: 1 cap(s) orally 3 times a day   HYDROmorphone 8 mg oral tablet: 1 tab(s) orally every 6 hours, As Needed MDD:4  metFORMIN:   metoprolol succinate 50 mg oral tablet, extended release: 1 tab(s) orally once a day  MS Contin 30 mg oral tablet, extended release: 3 tabs orally every 8 hours   ISTOP [id number] MDD:9 tabs  PRICE CHECK [id number]  OLANZapine 10 mg oral tablet: 1 tab(s) orally once a day (at bedtime)  OLANZapine 5 mg oral tablet: 1 tab(s) orally once a day  polyethylene glycol 3350 oral powder for reconstitution: 17 gram(s) orally 2 times a day  senna oral tablet: 2 tab(s) orally once a day (at bedtime)  zolpidem 10 mg oral tablet: 1 tab(s) orally once a day (at bedtime)

## 2022-09-01 NOTE — PROGRESS NOTE ADULT - ASSESSMENT
57y Male with Hx rectal/prostate cancer s/p radiation and colostomy, lower GIB, now s/p APR with end colostomy creation, VRAM flap closure on 8/24    - flap healthy-appearing  - bacitracin, xeroform, abd pads over flap  - JPx2 suction, drain care -- plan to remove lower/deep TALI today, will monitor outputs of upper TALI  - NO SITTING- patient may lay on back or stand  - assistance with ambulating and moving from laying to standing  -IR drained abscess - 60cc removed 10F drain placed  -Recommend abx for discharge if dispo planning  -Must continue not to sit upon discharge until follow up with Plastics outpatient  -Can used Sitz bath/shower to clean himself if discharging  -okay to remove davis, but patient cannot sit to urinate  -appreciate great care from primary team    Maximino Frazier  PRS  [id number]  57y Male with Hx rectal/prostate cancer s/p radiation and colostomy, lower GIB, now s/p APR with end colostomy creation, VRAM flap closure on 8/24    - flap healthy-appearing  - bacitracin, xeroform, abd pads over flap  - JPx2 suction, drain care -- plan to remove lower/deep TALI today, will monitor outputs of upper TALI  - NO SITTING- patient may lay on back or stand  - assistance with ambulating and moving from laying to standing  -IR drained abscess - 60cc removed 10F drain placed, managed by IR  -Recommend abx for discharge if dispo planning  -Must continue not to sit upon discharge until follow up with Plastics outpatient  -Can used Sitz bath/shower to clean himself if discharging  -okay to remove davis, but patient cannot sit to urinate  -appreciate great care from primary team    Maximino Frazier  PRS  [id number]

## 2022-09-01 NOTE — DISCHARGE NOTE PROVIDER - HOSPITAL COURSE
57M with Hx rectal/prostate cancer s/p radiation, hx of sigmoid colostomy presents to [facility] for scheduled abdominal peritoneal resection with Dr. Betancourt. He went to the OR and is s/p APR with end colostomy creation, VRAM flap closure on 8/24 Patient tolerated the procedure well, without complication. Patient remained hemodynamically stable in the PACU and transferred to the surgical floor. Post op patient had a leukocytosis and a tachycardia and was febrile, he had a CT scan revealing a thigh abscess that was drained by IR. Diet was restarted and advanced as tolerated. Pain control was transitioned from IV to PO pain meds. At this time, patient is currently ambulating, voiding, tolerating a regular diet. Pain well controlled on PO pain meds. Patient has been deemed stable for discharge home with follow up as an outpatient. 57M with Hx rectal/prostate cancer s/p radiation, hx of sigmoid colostomy presents to [facility] for scheduled abdominal peritoneal resection with Dr. Betancourt. He went to the OR and is s/p APR with end colostomy creation, VRAM flap closure on 8/24 Patient tolerated the procedure well, without complication. Patient remained hemodynamically stable in the PACU and transferred to the surgical floor. Post op patient had a leukocytosis and a tachycardia and was febrile, he had a CT scan revealing a thigh abscess that was drained by IR. Diet was restarted and advanced as tolerated. Pain control was transitioned from IV to PO pain meds. At this time, patient is currently ambulating, voiding, tolerating a regular diet. Pain well controlled on PO pain meds. Patient has been deemed stable for discharge home with follow up as an outpatient with 12 more days of augmentin.

## 2022-09-01 NOTE — PROGRESS NOTE ADULT - SUBJECTIVE AND OBJECTIVE BOX
57y Male s/p left thigh drainage on 8/31/22 in Interventional Radiology. Patient seen and examined bedside resting comfortably. No complaints offered.    T(F): 98.4 (09-01-22 @ 09:42), Max: 99.4 (08-31-22 @ 18:15)  HR: 102 (09-01-22 @ 09:42) (102 - 113)  BP: 129/77 (09-01-22 @ 09:42) (114/77 - 129/77)  RR: 18 (09-01-22 @ 09:42) (16 - 18)  SpO2: 99% (09-01-22 @ 09:42) (97% - 99%)  Wt(kg): --    LABS:                        8.5    10.69 )-----------( 416      ( 01 Sep 2022 06:04 )             25.9     09-01    134<L>  |  98  |  8   ----------------------------<  125<H>  3.9   |  26  |  0.61    Ca    8.5      01 Sep 2022 06:04  Phos  3.3     09-01  Mg     2.00     09-01      PT/INR - ( 31 Aug 2022 05:50 )   PT: 16.3 sec;   INR: 1.40 ratio         PTT - ( 31 Aug 2022 05:50 )  PTT:28.4 sec  I&O's Detail    31 Aug 2022 07:01  -  01 Sep 2022 07:00  --------------------------------------------------------  IN:  Total IN: 0 mL    OUT:    Bulb (mL): 105 mL    Bulb (mL): 18 mL    Bulb (mL): 12 mL    Colostomy (mL): 350 mL    Voided (mL): 1850 mL  Total OUT: 2335 mL    Total NET: -2335 mL      01 Sep 2022 07:01  -  01 Sep 2022 12:09  --------------------------------------------------------  IN:  Total IN: 0 mL    OUT:    Bulb (mL): 12 mL    Bulb (mL): 5 mL    Bulb (mL): 2.5 mL    Voided (mL): 400 mL  Total OUT: 419.5 mL    Total NET: -419.5 mL            PHYSICAL EXAM:  General: Nontoxic, in NAD  Drain c/d/i, serosanguinous output, flushed with 5cc NS

## 2022-09-01 NOTE — DISCHARGE NOTE PROVIDER - CARE PROVIDER_API CALL
Angel Betancourt)  ColonRectal Surgery; Surgery  [facility], [address] [place] [address]  Phone: [phone]  Fax: [phone]  Follow Up Time: 1 week    German Riggs)  ColonRectal Surgery; Plastic Surgery; Surgery; Surgery of the Hand  [address] [facility], [address]  [place] [address]  Phone: [phone]  Fax: [phone]  Follow Up Time: 1 week

## 2022-09-01 NOTE — PROGRESS NOTE ADULT - ASSESSMENT
57M with PMHx rectal/prostate cancer s/p radiation, hx of sigmoid colostomy in March with Dr. Avilez, s/p hospitalization and SICU admission for lower GIB, discharged 8/19, now s/p APR with end colostomy creation, VRAM flap closure on 8/24 and IR aspiration of drainage of L medial thigh collection 8/31.     Plan:  - Work with PT for OOB WITHOUT sitting per PRS recommendations  - Diet: LRD  - +BM, + Flatus  - Strict I&O's  - Monitor drains x3 output   - LR 100ml/hr. Will evaluate need to continue IVF given adequate UOP   - Continue Zosyn  - Continue home BP and HIV medications  - DVT prophylaxis: SubQ Heparin      Team A  P# [id number]

## 2022-09-01 NOTE — DISCHARGE NOTE PROVIDER - DETAILS OF MALNUTRITION DIAGNOSIS/DIAGNOSES
This patient has been assessed with a concern for Malnutrition and was treated during this hospitalization for the following Nutrition diagnosis/diagnoses:     -  08/31/2022: Severe protein-calorie malnutrition

## 2022-09-01 NOTE — PROGRESS NOTE ADULT - ASSESSMENT
57y Male s/p left thigh drainage on 8/31/22 in Interventional Radiology.    Plan:  - Continue drainage, monitor output  - Can be discharged home with drain if outputs remain high  - Flush drain 5cc NS qd  - Will need noncontrast CT + IR tube check once outputs < 10cc/24hr, can be arranged as outpatient follow-up. Outpatient IR office [phone]    [phone]

## 2022-09-01 NOTE — DISCHARGE NOTE PROVIDER - NSDCFUSCHEDAPPT_GEN_ALL_CORE_FT
Ronald Gil  [facility] Physician [facility]  INTMED [id number]  Scheduled Appointment: 09/14/2022    Denise Gordon  [facility]  INFDISEASE [address]  Scheduled Appointment: 09/22/2022    Kenny Lim  [facility]  CARDIOLOGY [address]  Scheduled Appointment: 11/02/2022

## 2022-09-01 NOTE — PROGRESS NOTE ADULT - SUBJECTIVE AND OBJECTIVE BOX
Plastic Surgery Daily Progress Note  =====================================================    SUBJECTIVE: Patient seen and examined at bedside on AM rounds. Patient reports that they're feeling well. No fevers for last 24 hours. IR drained abscess 9/1.    PMH:   APR w/ flap closure, VRAM flap    ALLERGIES:  Allergy Status Unknown  No Known Allergies  --------------------------------------------------------------------------------------    MEDICATIONS:    Neurologic Medications  acetaminophen     Tablet .. 650 milliGRAM(s) Oral every 6 hours PRN Temp greater or equal to 38C (100.4F)  gabapentin 600 milliGRAM(s) Oral <User Schedule>  gabapentin 300 milliGRAM(s) Oral <User Schedule>  HYDROmorphone   Tablet 8 milliGRAM(s) Oral every 6 hours PRN Moderate- Severe Pain (4 - 10)  HYDROmorphone  Injectable 1 milliGRAM(s) IV Push every 6 hours PRN Severe Breakthrough Pain (7 - 10)  morphine ER Tablet 100 milliGRAM(s) Oral every 12 hours  ondansetron Injectable 4 milliGRAM(s) IV Push every 6 hours PRN Nausea  zolpidem 5 milliGRAM(s) Oral at bedtime PRN Insomnia  zolpidem 5 milliGRAM(s) Oral at bedtime PRN Insomnia    Respiratory Medications    Cardiovascular Medications  amLODIPine   Tablet 5 milliGRAM(s) Oral daily  metoprolol succinate ER 50 milliGRAM(s) Oral daily    Gastrointestinal Medications    Genitourinary Medications    Hematologic/Oncologic Medications  heparin   Injectable 5000 Unit(s) SubCutaneous every 12 hours    Antimicrobial/Immunologic Medications  bictegravir 50 mG/emtricitabine 200 mG/tenofovir alafenamide 25 mG (BIKTARVY) 1 Tablet(s) Oral daily  piperacillin/tazobactam IVPB.. 3.375 Gram(s) IV Intermittent every 8 hours    Endocrine/Metabolic Medications    Topical/Other Medications  chlorhexidine 2% Cloths 1 Application(s) Topical daily  lidocaine   4% Patch 1 Patch Transdermal every 24 hours  naloxone Injectable 0.1 milliGRAM(s) IV Push every 3 minutes PRN For ANY of the following changes in patient status:  A. RR LESS THAN 10 breaths per minute, B. Oxygen saturation LESS THAN 90%, C. Sedation score of 6    --------------------------------------------------------------------------------------    VITAL SIGNS:  T(C): 36.9 (01 Sep 2022 09:42), Max: 37.4 (31 Aug 2022 18:15)  T(F): 98.4 (01 Sep 2022 09:42), Max: 99.4 (31 Aug 2022 18:15)  HR: 102 (01 Sep 2022 09:42) (102 - 113)  BP: 129/77 (01 Sep 2022 09:42) (114/77 - 129/77)  BP(mean): --  ABP: --  ABP(mean): --  RR: 18 (01 Sep 2022 09:42) (16 - 18)  SpO2: 99% (01 Sep 2022 09:42) (97% - 99%)    O2 Parameters below as of 01 Sep 2022 09:42  Patient On (Oxygen Delivery Method): room air  --------------------------------------------------------------------------------------    EXAM    -- CONSTITUTIONAL: NAD, lying in bed  -- NEURO: Awake, alert  -- PULM: Non-labored respirations  -- ABDOMEN: incision is c/d/i, no erythema, JPx2 to suction s/s output,   -- PERINEUM: flap warm, incisions intact, stable good color and cap refill, healthy appearing, area of darker skin on posterior surface from prior tattoo, dressing c/d/i      --------------------------------------------------------------------------------------    LABS                        8.5    10.69 )-----------( 416      ( 01 Sep 2022 06:04 )             25.9   09-01    134<L>  |  98  |  8   ----------------------------<  125<H>  3.9   |  26  |  0.61    Ca    8.5      01 Sep 2022 06:04  Phos  3.3     09-01  Mg     2.00     09-01    --------------------------------------------------------------------------------------    INS AND OUTS:  I&O's Detail    31 Aug 2022 07:01  -  01 Sep 2022 07:00  --------------------------------------------------------  IN:  Total IN: 0 mL    OUT:    Bulb (mL): 105 mL    Bulb (mL): 18 mL    Bulb (mL): 12 mL    Colostomy (mL): 350 mL    Voided (mL): 1850 mL  Total OUT: 2335 mL    Total NET: -2335 mL      01 Sep 2022 07:01  -  01 Sep 2022 11:08  --------------------------------------------------------  IN:  Total IN: 0 mL    OUT:    Bulb (mL): 12 mL    Bulb (mL): 5 mL    Bulb (mL): 2.5 mL    Voided (mL): 400 mL  Total OUT: 419.5 mL    Total NET: -419.5 mL    -------------------------------------------------------------------------------------- Plastic Surgery Daily Progress Note  =====================================================    SUBJECTIVE: Patient seen and examined at bedside on AM rounds. Patient reports that they're feeling well. No fevers for last 24 hours. IR drained abscess 9/1.    PMH:   APR w/ flap closure, VRAM flap    ALLERGIES:  Allergy Status Unknown  No Known Allergies  --------------------------------------------------------------------------------------    MEDICATIONS:    Neurologic Medications  acetaminophen     Tablet .. 650 milliGRAM(s) Oral every 6 hours PRN Temp greater or equal to 38C (100.4F)  gabapentin 600 milliGRAM(s) Oral <User Schedule>  gabapentin 300 milliGRAM(s) Oral <User Schedule>  HYDROmorphone   Tablet 8 milliGRAM(s) Oral every 6 hours PRN Moderate- Severe Pain (4 - 10)  HYDROmorphone  Injectable 1 milliGRAM(s) IV Push every 6 hours PRN Severe Breakthrough Pain (7 - 10)  morphine ER Tablet 100 milliGRAM(s) Oral every 12 hours  ondansetron Injectable 4 milliGRAM(s) IV Push every 6 hours PRN Nausea  zolpidem 5 milliGRAM(s) Oral at bedtime PRN Insomnia  zolpidem 5 milliGRAM(s) Oral at bedtime PRN Insomnia    Respiratory Medications    Cardiovascular Medications  amLODIPine   Tablet 5 milliGRAM(s) Oral daily  metoprolol succinate ER 50 milliGRAM(s) Oral daily    Gastrointestinal Medications    Genitourinary Medications    Hematologic/Oncologic Medications  heparin   Injectable 5000 Unit(s) SubCutaneous every 12 hours    Antimicrobial/Immunologic Medications  bictegravir 50 mG/emtricitabine 200 mG/tenofovir alafenamide 25 mG (BIKTARVY) 1 Tablet(s) Oral daily  piperacillin/tazobactam IVPB.. 3.375 Gram(s) IV Intermittent every 8 hours    Endocrine/Metabolic Medications    Topical/Other Medications  chlorhexidine 2% Cloths 1 Application(s) Topical daily  lidocaine   4% Patch 1 Patch Transdermal every 24 hours  naloxone Injectable 0.1 milliGRAM(s) IV Push every 3 minutes PRN For ANY of the following changes in patient status:  A. RR LESS THAN 10 breaths per minute, B. Oxygen saturation LESS THAN 90%, C. Sedation score of 6    --------------------------------------------------------------------------------------    VITAL SIGNS:  T(C): 36.9 (01 Sep 2022 09:42), Max: 37.4 (31 Aug 2022 18:15)  T(F): 98.4 (01 Sep 2022 09:42), Max: 99.4 (31 Aug 2022 18:15)  HR: 102 (01 Sep 2022 09:42) (102 - 113)  BP: 129/77 (01 Sep 2022 09:42) (114/77 - 129/77)  BP(mean): --  ABP: --  ABP(mean): --  RR: 18 (01 Sep 2022 09:42) (16 - 18)  SpO2: 99% (01 Sep 2022 09:42) (97% - 99%)    O2 Parameters below as of 01 Sep 2022 09:42  Patient On (Oxygen Delivery Method): room air  --------------------------------------------------------------------------------------    EXAM    -- CONSTITUTIONAL: NAD, lying in bed  -- NEURO: Awake, alert  -- PULM: Non-labored respirations  -- ABDOMEN: incision is c/d/i, no erythema, JPx2 to suction s/s output,   -- PERINEUM: flap warm, incisions intact, stable good color and cap refill, healthy appearing, area of darker skin on posterior surface from prior tattoo, dressing c/d/i; IR drain output SS with some purulence      --------------------------------------------------------------------------------------    LABS                        8.5    10.69 )-----------( 416      ( 01 Sep 2022 06:04 )             25.9   09-01    134<L>  |  98  |  8   ----------------------------<  125<H>  3.9   |  26  |  0.61    Ca    8.5      01 Sep 2022 06:04  Phos  3.3     09-01  Mg     2.00     09-01    --------------------------------------------------------------------------------------    INS AND OUTS:  I&O's Detail    31 Aug 2022 07:01  -  01 Sep 2022 07:00  --------------------------------------------------------  IN:  Total IN: 0 mL    OUT:    Bulb (mL): 105 mL    Bulb (mL): 18 mL    Bulb (mL): 12 mL    Colostomy (mL): 350 mL    Voided (mL): 1850 mL  Total OUT: 2335 mL    Total NET: -2335 mL      01 Sep 2022 07:01  -  01 Sep 2022 11:08  --------------------------------------------------------  IN:  Total IN: 0 mL    OUT:    Bulb (mL): 12 mL    Bulb (mL): 5 mL    Bulb (mL): 2.5 mL    Voided (mL): 400 mL  Total OUT: 419.5 mL    Total NET: -419.5 mL    --------------------------------------------------------------------------------------

## 2022-09-01 NOTE — PROGRESS NOTE ADULT - SUBJECTIVE AND OBJECTIVE BOX
COLORECTAL SURGERY PROGRESS NOTE    SUBJECTIVE    OVERNIGHT EVENTS: Patient no longer with low grade temps. He continues to have sinus tachycardia into the 110s.     Patient states he tolerated IR procedure yesterday well. Otherwise has no complaints, continues to tolerate a diet.     10-point review of systems completed and negative except as noted above.      OBJECTIVE    MEDICATIONS  acetaminophen     Tablet .. 650 milliGRAM(s) Oral every 6 hours PRN  amLODIPine   Tablet 5 milliGRAM(s) Oral daily  bictegravir 50 mG/emtricitabine 200 mG/tenofovir alafenamide 25 mG (BIKTARVY) 1 Tablet(s) Oral daily  chlorhexidine 2% Cloths 1 Application(s) Topical daily  gabapentin 600 milliGRAM(s) Oral <User Schedule>  gabapentin 300 milliGRAM(s) Oral <User Schedule>  heparin   Injectable 5000 Unit(s) SubCutaneous every 12 hours  HYDROmorphone   Tablet 8 milliGRAM(s) Oral every 6 hours PRN  HYDROmorphone  Injectable 1 milliGRAM(s) IV Push every 6 hours PRN  lidocaine   4% Patch 1 Patch Transdermal every 24 hours  metoprolol succinate ER 50 milliGRAM(s) Oral daily  morphine ER Tablet 100 milliGRAM(s) Oral every 12 hours  naloxone Injectable 0.1 milliGRAM(s) IV Push every 3 minutes PRN  ondansetron Injectable 4 milliGRAM(s) IV Push every 6 hours PRN  piperacillin/tazobactam IVPB.. 3.375 Gram(s) IV Intermittent every 8 hours  zolpidem 5 milliGRAM(s) Oral at bedtime PRN  zolpidem 5 milliGRAM(s) Oral at bedtime PRN      PHYSICAL EXAM  Vital Signs Last 24 Hrs  T(C): 37.5 (30 Aug 2022 05:46), Max: 38.3 (29 Aug 2022 21:05)  T(F): 99.5 (30 Aug 2022 05:46), Max: 100.9 (29 Aug 2022 21:05)  HR: 115 (30 Aug 2022 05:46) (112 - 123)  BP: 127/68 (30 Aug 2022 05:46) (111/74 - 150/99)  RR: 18 (30 Aug 2022 05:46) (18 - 20)  SpO2: 99% (30 Aug 2022 05:46) (98% - 100%)    O2 Parameters below as of 30 Aug 2022 05:46  Patient On (Oxygen Delivery Method): room air      General Appearance: Appears well, NAD  Respiratory: No labored breathing  CV: Pulse regularly present  Abdomen: Soft, appropriately tender to palpation around the incision, Incision c/d/i, uncovered, Ostomy pink and viable, stool noted within bag. L buttock IR drain with ss output. PRS drains x2 serous output.       LABS                                   8.7    17.51 )-----------( 390      ( 30 Aug 2022 06:15 )             27.1   08-30    131<L>  |  97<L>  |  8   ----------------------------<  106<H>  3.8   |  26  |  0.52    Ca    8.5      30 Aug 2022 06:15  Phos  3.1     08-30  Mg     1.90     08-30    TPro  6.1  /  Alb  2.6<L>  /  TBili  0.2  /  DBili  x   /  AST  17  /  ALT  12  /  AlkPhos  80  08-29      RADIOLOGY & ADDITIONAL STUDIES

## 2022-09-02 ENCOUNTER — TRANSCRIPTION ENCOUNTER (OUTPATIENT)
Age: 57
End: 2022-09-02

## 2022-09-02 VITALS
RESPIRATION RATE: 18 BRPM | SYSTOLIC BLOOD PRESSURE: 110 MMHG | OXYGEN SATURATION: 98 % | DIASTOLIC BLOOD PRESSURE: 77 MMHG | HEART RATE: 111 BPM | TEMPERATURE: 99 F

## 2022-09-02 LAB
ANION GAP SERPL CALC-SCNC: 9 MMOL/L — SIGNIFICANT CHANGE UP (ref 7–14)
BUN SERPL-MCNC: 6 MG/DL — LOW (ref 7–23)
CALCIUM SERPL-MCNC: 9.1 MG/DL — SIGNIFICANT CHANGE UP (ref 8.4–10.5)
CHLORIDE SERPL-SCNC: 100 MMOL/L — SIGNIFICANT CHANGE UP (ref 98–107)
CO2 SERPL-SCNC: 29 MMOL/L — SIGNIFICANT CHANGE UP (ref 22–31)
CREAT SERPL-MCNC: 0.64 MG/DL — SIGNIFICANT CHANGE UP (ref 0.5–1.3)
EGFR: 111 ML/MIN/1.73M2 — SIGNIFICANT CHANGE UP
GLUCOSE SERPL-MCNC: 108 MG/DL — HIGH (ref 70–99)
HCT VFR BLD CALC: 28.4 % — LOW (ref 39–50)
HGB BLD-MCNC: 8.9 G/DL — LOW (ref 13–17)
MAGNESIUM SERPL-MCNC: 2 MG/DL — SIGNIFICANT CHANGE UP (ref 1.6–2.6)
MCHC RBC-ENTMCNC: 28 PG — SIGNIFICANT CHANGE UP (ref 27–34)
MCHC RBC-ENTMCNC: 31.3 GM/DL — LOW (ref 32–36)
MCV RBC AUTO: 89.3 FL — SIGNIFICANT CHANGE UP (ref 80–100)
NRBC # BLD: 0 /100 WBCS — SIGNIFICANT CHANGE UP (ref 0–0)
NRBC # FLD: 0 K/UL — SIGNIFICANT CHANGE UP (ref 0–0)
PHOSPHATE SERPL-MCNC: 4.1 MG/DL — SIGNIFICANT CHANGE UP (ref 2.5–4.5)
PLATELET # BLD AUTO: 471 K/UL — HIGH (ref 150–400)
POTASSIUM SERPL-MCNC: 4 MMOL/L — SIGNIFICANT CHANGE UP (ref 3.5–5.3)
POTASSIUM SERPL-SCNC: 4 MMOL/L — SIGNIFICANT CHANGE UP (ref 3.5–5.3)
RBC # BLD: 3.18 M/UL — LOW (ref 4.2–5.8)
RBC # FLD: 16 % — HIGH (ref 10.3–14.5)
SODIUM SERPL-SCNC: 138 MMOL/L — SIGNIFICANT CHANGE UP (ref 135–145)
WBC # BLD: 8.58 K/UL — SIGNIFICANT CHANGE UP (ref 3.8–10.5)
WBC # FLD AUTO: 8.58 K/UL — SIGNIFICANT CHANGE UP (ref 3.8–10.5)

## 2022-09-02 RX ORDER — HYDROMORPHONE HYDROCHLORIDE 2 MG/ML
1 INJECTION INTRAMUSCULAR; INTRAVENOUS; SUBCUTANEOUS
Qty: 12 | Refills: 0
Start: 2022-09-02 | End: 2022-09-04

## 2022-09-02 RX ORDER — ZOLPIDEM TARTRATE 10 MG/1
1 TABLET ORAL
Qty: 0 | Refills: 0 | DISCHARGE

## 2022-09-02 RX ORDER — HYDROMORPHONE HYDROCHLORIDE 2 MG/ML
1 INJECTION INTRAMUSCULAR; INTRAVENOUS; SUBCUTANEOUS
Qty: 0 | Refills: 0 | DISCHARGE

## 2022-09-02 RX ORDER — ATORVASTATIN CALCIUM 80 MG/1
1 TABLET, FILM COATED ORAL
Qty: 0 | Refills: 0 | DISCHARGE

## 2022-09-02 RX ORDER — METOPROLOL TARTRATE 50 MG
1 TABLET ORAL
Qty: 0 | Refills: 0 | DISCHARGE

## 2022-09-02 RX ADMIN — HYDROMORPHONE HYDROCHLORIDE 8 MILLIGRAM(S): 2 INJECTION INTRAMUSCULAR; INTRAVENOUS; SUBCUTANEOUS at 05:00

## 2022-09-02 RX ADMIN — PIPERACILLIN AND TAZOBACTAM 25 GRAM(S): 4; .5 INJECTION, POWDER, LYOPHILIZED, FOR SOLUTION INTRAVENOUS at 06:15

## 2022-09-02 RX ADMIN — MORPHINE SULFATE 100 MILLIGRAM(S): 50 CAPSULE, EXTENDED RELEASE ORAL at 12:41

## 2022-09-02 RX ADMIN — MORPHINE SULFATE 100 MILLIGRAM(S): 50 CAPSULE, EXTENDED RELEASE ORAL at 11:41

## 2022-09-02 RX ADMIN — HYDROMORPHONE HYDROCHLORIDE 8 MILLIGRAM(S): 2 INJECTION INTRAMUSCULAR; INTRAVENOUS; SUBCUTANEOUS at 04:09

## 2022-09-02 RX ADMIN — BICTEGRAVIR SODIUM, EMTRICITABINE, AND TENOFOVIR ALAFENAMIDE FUMARATE 1 TABLET(S): 30; 120; 15 TABLET ORAL at 11:41

## 2022-09-02 RX ADMIN — GABAPENTIN 300 MILLIGRAM(S): 400 CAPSULE ORAL at 06:21

## 2022-09-02 RX ADMIN — Medication 50 MILLIGRAM(S): at 06:14

## 2022-09-02 RX ADMIN — AMLODIPINE BESYLATE 5 MILLIGRAM(S): 2.5 TABLET ORAL at 06:14

## 2022-09-02 RX ADMIN — HEPARIN SODIUM 5000 UNIT(S): 5000 INJECTION INTRAVENOUS; SUBCUTANEOUS at 06:15

## 2022-09-02 NOTE — PROGRESS NOTE ADULT - REASON FOR ADMISSION
scheduled APR

## 2022-09-02 NOTE — DISCHARGE NOTE NURSING/CASE MANAGEMENT/SOCIAL WORK - PATIENT PORTAL LINK FT
You can access the FollowMyHealth Patient Portal offered by [facility] by registering at the following website: [email or web address]. By joining [facility]’s FollowMyHealth portal, you will also be able to view your health information using other applications (apps) compatible with our system.

## 2022-09-02 NOTE — DISCHARGE NOTE NURSING/CASE MANAGEMENT/SOCIAL WORK - NSDPDISTO_GEN_ALL_CORE
stable, tolerating diet, voiding adequatel/Home stable, tolerating diet, voiding adequately, left buttock dressing intact, Left TALI in place, RLQ colostomy in place, functioning, appliance intact/Home

## 2022-09-02 NOTE — PROGRESS NOTE ADULT - NUTRITIONAL ASSESSMENT
This patient has been assessed with a concern for Malnutrition and has been determined to have a diagnosis/diagnoses of Severe protein-calorie malnutrition.    This patient is being managed with:   Diet Regular-  Fiber/Residue Restricted (LOWFIBER)  Entered: Aug 31 2022  2:14PM    

## 2022-09-02 NOTE — PROGRESS NOTE ADULT - PROVIDER SPECIALTY LIST ADULT
Colorectal Surgery
Heme/Onc
Plastic Surgery
Colorectal Surgery
Heme/Onc
Pain Medicine
Pain Medicine
Plastic Surgery
Colorectal Surgery
Intervent Radiology
Pain Medicine
Plastic Surgery
Plastic Surgery
Surgery
Plastic Surgery
Plastic Surgery
Colorectal Surgery

## 2022-09-02 NOTE — DISCHARGE NOTE NURSING/CASE MANAGEMENT/SOCIAL WORK - NSDCPNINST_GEN_ALL_CORE
Notify Dr Betancourt if you experience any increase in pain not relieved with medication, any persistent nausea vomiting, any redness, drainage or swelling around incision, any fever >100.5. Flush drain with 5ccs of NS every day.  Wash your hands with soap and water, clean area with alcohol wipe, expel air out of prefilled syringe, attach syringe to tube to be flushed, gently instill fluid, remove syringe, clean with alcohol and reattach bulb.  no heavy lifting or straining.  colostomy care as instructed.  Complete the course of antibiotics as prescribed.  Change dressing daily Notify Dr Betancourt if you experience any increase in pain not relieved with medication, any persistent nausea vomiting, any redness, drainage or swelling around incision, any fever >100.5. Flush drain with 5ccs of NS every day.  Wash your hands with soap and water, clean area with alcohol wipe, expel air out of prefilled syringe, attach syringe to tube to be flushed, gently instill fluid, remove syringe, clean with alcohol and reattach bulb.  no heavy lifting or straining.  colostomy care as instructed.  Complete the course of antibiotics as prescribed.  Change left buttock dressing daily.

## 2022-09-02 NOTE — PROGRESS NOTE ADULT - ASSESSMENT
57M with PMHx rectal/prostate cancer s/p radiation, hx of sigmoid colostomy in March with Dr. Avilez, s/p hospitalization and SICU admission for lower GIB, discharged 8/19, now s/p APR with end colostomy creation, VRAM flap closure on 8/24 and IR aspiration of drainage of L medial thigh collection 8/31 ready for DC    Plan:  - Diet: LRD  - Monitor drains   - Continue Zosyn, f/u with plastics on outpatient abx  - Continue home BP and HIV medications  - DVT prophylaxis: SubQ Heparin      Team A  P# [id number] 57M with PMHx rectal/prostate cancer s/p radiation, hx of sigmoid colostomy in March with Dr. Avilez, s/p hospitalization and SICU admission for lower GIB, discharged 8/19, now s/p APR with end colostomy creation, VRAM flap closure on 8/24 and IR aspiration of drainage of L medial thigh collection 8/31 ready for DC    Plan:  - Diet: LRD  - Monitor drains   - Continue Zosyn, dc with augmentin x 12 more days  - Continue home BP and HIV medications  - DVT prophylaxis: SubQ Heparin  - Seen and discussed with A team  - DC today    Team A  P# [id number]

## 2022-09-02 NOTE — PROGRESS NOTE ADULT - ASSESSMENT
Subjective        Chief Complaint   Patient presents with   • Tailbone Pain           Trey Kimble is a 54 y.o. male who presents for    Patient Active Problem List   Diagnosis   • Essential hypertension   • Other hyperlipidemia   • Anxiety   • Fatty liver   • Other cirrhosis of liver (HCC)   • Thrombocytopenia (HCC)   • Disorder of duodenum   • Type 2 diabetes mellitus without complication, with long-term current use of insulin (HCC)   • Microalbuminuria       History of Present Illness     He slipped in the snow in [place] and he hit his tail bone. He hit it on a slab of concrete. Advil does not help a lot. It hurts to sit. He says it is worse when he gets up and moves around. He has no back or abdominal pain.  Allergies   Allergen Reactions   • Niacin Myalgia       Current Outpatient Medications on File Prior to Visit   Medication Sig Dispense Refill   • amLODIPine (NORVASC) 10 MG tablet TAKE ONE TABLET BY MOUTH DAILY 30 tablet 4   • atorvastatin (LIPITOR) 20 MG tablet TAKE ONE TABLET BY MOUTH DAILY 30 tablet 5   • BD Pen Needle Magda U/F 32G X 4 MM misc USE ONCE DAILY WITH LANTUS  each 10   • Continuous Blood Gluc  (FREESTYLE HAYDEE READER) device USE AS DIRECTED DAILY 1 each 0   • Continuous Blood Gluc Sensor (FreeStyle Haydee 14 Day Sensor) misc USE ONE SENSOR EVERY 14 DAYS 2 each 11   • glipizide (GLUCOTROL XL) 5 MG ER tablet TAKE ONE TABLET BY MOUTH EVERY MORNING BEFORE BREAKFAST 30 tablet 5   • glucose blood test strip 1 each by Other route As Needed. Use as instructed     • Janumet XR  MG tablet TAKE TWO TABLETS BY MOUTH DAILY 60 tablet 3   • Lantus SoloStar 100 UNIT/ML injection pen INJECT 65 UNITS UNDER THE SKIN INTO THE APPROPRIATE AREA DAILY AS DIRECTED 3 pen 1   • losartan (COZAAR) 50 MG tablet TAKE ONE TABLET BY MOUTH DAILY 30 tablet 3   • Multiple Vitamin (MULTIVITAMIN) tablet Take 1 tablet by mouth Daily.     • sertraline (ZOLOFT) 100 MG tablet TAKE TWO TABLETS BY MOUTH DAILY  "60 tablet 3     No current facility-administered medications on file prior to visit.       Past Medical History:   Diagnosis Date   • Biceps tendinitis of left upper extremity    • COVID-19 01/2021   • Essential hypertriglyceridemia    • Kidney stone        Past Surgical History:   Procedure Laterality Date   • COLONOSCOPY  10/03/2019   • CYSTOSCOPY BLADDER STONE LITHOTRIPSY  2019   • ENDOSCOPY  10/03/2019       Family History   Problem Relation Age of Onset   • Hypertension Mother    • Heart failure Father    • Skin cancer Father    • Other Father         rheumatic fever       Social History     Socioeconomic History   • Marital status:    Tobacco Use   • Smoking status: Never Smoker   • Smokeless tobacco: Never Used   Substance and Sexual Activity   • Alcohol use: No   • Drug use: No   • Sexual activity: Defer           The following portions of the patient's history were reviewed and updated as appropriate: problem list, allergies, current medications, past medical history, past family history, past social history and past surgical history.    Review of Systems    Immunization History   Administered Date(s) Administered   • COVID-19 (PFIZER) PURPLE CAP 04/23/2021, 05/14/2021, 12/17/2021   • Flu Vaccine Quad PF >18YRS 10/05/2018, 10/15/2021   • FluLaval/Fluarix/Fluzone >6 10/05/2020   • Hepatitis A 03/08/2018, 11/07/2018   • Hepatitis B 03/08/2018, 04/11/2018, 11/07/2018   • Hepatitis B Vaccine Adult IM 10/10/2019   • Pneumococcal Polysaccharide (PPSV23) 02/09/2016   • Tdap 01/01/2009, 06/06/2019   • flucelvax quad pfs =>4 YRS 09/19/2019       Objective   Vitals:    03/24/22 1154   Temp: 97.8 °F (36.6 °C)   Weight: 117 kg (257 lb)   Height: 180.3 cm (70.98\")     Body mass index is 35.86 kg/m².  Physical Exam  Musculoskeletal:      Comments: Tender above gluteal cleft to palpation.    Large ecchymosis on top each buttocks and to right flank and down right buttocks.   Neurological:      Mental Status: He is " alert.         Procedures    Assessment/Plan   Diagnoses and all orders for this visit:    1. Sacral pain (Primary)  Comments:  Discussed imaging; I don't think it would change what we would do short term. He will use APAP, heat and lidocaine patches.                 No follow-ups on file.   57y Male with Hx rectal/prostate cancer s/p radiation and colostomy, lower GIB, now s/p APR with end colostomy creation, VRAM flap closure on 8/24    - flap healthy-appearing  - For discharge: bacitracin, xeroform, abd pads over flap   - Removed both JPs, remaining drain per IR  - NO SITTING- patient may lay on back or stand  - assistance with ambulating and moving from laying to standing  -IR drained abscess - 60cc removed 10F drain placed, managed by IR  -Recommend abx for discharge if dispo planning  -Must continue not to sit upon discharge until follow up with Plastics outpatient  -Can used Sitz bath/shower to clean himself if discharging  -okay to remove davis, but patient cannot sit to urinate  -appreciate great care from primary team  - OK for d/c from plastics perspective     PRS  [id number]

## 2022-09-02 NOTE — PROGRESS NOTE ADULT - SUBJECTIVE AND OBJECTIVE BOX
Plastic Surgery Progress Note (pg [facility]: [id number], [facility]: [phone])    SUBJECTIVE  The patient was seen and examined. No acute events overnight.    OBJECTIVE  ___________________________________________________  VITAL SIGNS / I&O's   Vital Signs Last 24 Hrs  T(C): 37 (02 Sep 2022 06:00), Max: 37.3 (01 Sep 2022 21:30)  T(F): 98.6 (02 Sep 2022 06:00), Max: 99.1 (01 Sep 2022 21:30)  HR: 97 (02 Sep 2022 06:00) (91 - 105)  BP: 126/89 (02 Sep 2022 06:00) (122/96 - 130/78)  BP(mean): --  RR: 17 (02 Sep 2022 06:00) (17 - 18)  SpO2: 99% (02 Sep 2022 06:00) (99% - 100%)    Parameters below as of 02 Sep 2022 06:00  Patient On (Oxygen Delivery Method): room air          01 Sep 2022 07:01  -  02 Sep 2022 07:00  --------------------------------------------------------  IN:  Total IN: 0 mL    OUT:    Bulb (mL): 2.5 mL    Bulb (mL): 69.5 mL    Bulb (mL): 12 mL    Colostomy (mL): 100 mL    Voided (mL): 2250 mL  Total OUT: 2434 mL    Total NET: -2434 mL        ___________________________________________________  PHYSICAL EXAM    -- CONSTITUTIONAL: NAD, lying in bed  -- NEURO: Awake, alert  -- PULM: Non-labored respirations  -- ABDOMEN: incision is c/d/i, no erythema, TALI s/s, removed  -- PERINEUM: flap warm, incisions intact, stable good color and cap refill, healthy appearing, area of darker skin on posterior surface from prior tattoo, dressing c/d/i; IR drain output SS with some purulence    ___________________________________________________  LABS                        8.5    10.69 )-----------( 416      ( 01 Sep 2022 06:04 )             25.9     01 Sep 2022 06:04    134    |  98     |  8      ----------------------------<  125    3.9     |  26     |  0.61     Ca    8.5        01 Sep 2022 06:04  Phos  3.3       01 Sep 2022 06:04  Mg     2.00      01 Sep 2022 06:04        CAPILLARY BLOOD GLUCOSE              ___________________________________________________  MICRO  Recent Cultures:  Specimen Source: .Body Fluid LEFT TIGH DRAINAGE, 08-31 @ 12:15; Results   Few Corynebacterium species "Susceptibilities not performed"; Gram Stain:   Moderate polymorphonuclear leukocytes per low power field  Rare Gram positive cocci in pairs per oil power field; Organism: --  Specimen Source: .Blood Blood, 08-29 @ 17:30; Results   No growth to date.; Gram Stain: --; Organism: --  Specimen Source: .Blood Blood, 08-29 @ 16:15; Results   No growth to date.; Gram Stain: --; Organism: --  Specimen Source: .Blood Blood-Venous, 08-27 @ 06:28; Results   No Growth Final; Gram Stain: --; Organism: --    ___________________________________________________  MEDICATIONS  (STANDING):  amLODIPine   Tablet 5 milliGRAM(s) Oral daily  bictegravir 50 mG/emtricitabine 200 mG/tenofovir alafenamide 25 mG (BIKTARVY) 1 Tablet(s) Oral daily  chlorhexidine 2% Cloths 1 Application(s) Topical daily  gabapentin 600 milliGRAM(s) Oral <User Schedule>  gabapentin 300 milliGRAM(s) Oral <User Schedule>  heparin   Injectable 5000 Unit(s) SubCutaneous every 12 hours  metoprolol succinate ER 50 milliGRAM(s) Oral daily  morphine ER Tablet 100 milliGRAM(s) Oral every 12 hours  piperacillin/tazobactam IVPB.. 3.375 Gram(s) IV Intermittent every 8 hours    MEDICATIONS  (PRN):  acetaminophen     Tablet .. 650 milliGRAM(s) Oral every 6 hours PRN Temp greater or equal to 38C (100.4F)  HYDROmorphone   Tablet 8 milliGRAM(s) Oral every 6 hours PRN Moderate- Severe Pain (4 - 10)  HYDROmorphone  Injectable 1 milliGRAM(s) IV Push every 6 hours PRN Severe Breakthrough Pain (7 - 10)  naloxone Injectable 0.1 milliGRAM(s) IV Push every 3 minutes PRN For ANY of the following changes in patient status:  A. RR LESS THAN 10 breaths per minute, B. Oxygen saturation LESS THAN 90%, C. Sedation score of 6  ondansetron Injectable 4 milliGRAM(s) IV Push every 6 hours PRN Nausea  zolpidem 5 milliGRAM(s) Oral at bedtime PRN Insomnia  zolpidem 5 milliGRAM(s) Oral at bedtime PRN Insomnia

## 2022-09-05 LAB
CULTURE RESULTS: SIGNIFICANT CHANGE UP
SPECIMEN SOURCE: SIGNIFICANT CHANGE UP

## 2022-09-06 ENCOUNTER — APPOINTMENT (OUTPATIENT)
Dept: PLASTIC SURGERY | Facility: CLINIC | Age: 57
End: 2022-09-06

## 2022-09-06 VITALS
TEMPERATURE: 97.9 F | SYSTOLIC BLOOD PRESSURE: 122 MMHG | HEART RATE: 136 BPM | WEIGHT: 196 LBS | HEIGHT: 71 IN | OXYGEN SATURATION: 99 % | DIASTOLIC BLOOD PRESSURE: 89 MMHG | BODY MASS INDEX: 27.44 KG/M2

## 2022-09-06 DIAGNOSIS — K62.6 ULCER OF ANUS AND RECTUM: ICD-10-CM

## 2022-09-06 LAB — SURGICAL PATHOLOGY STUDY: SIGNIFICANT CHANGE UP

## 2022-09-06 PROCEDURE — 99024 POSTOP FOLLOW-UP VISIT: CPT

## 2022-09-07 ENCOUNTER — NON-APPOINTMENT (OUTPATIENT)
Age: 57
End: 2022-09-07

## 2022-09-12 PROBLEM — K62.6 ANAL ULCER: Status: ACTIVE | Noted: 2021-12-29

## 2022-09-13 ENCOUNTER — APPOINTMENT (OUTPATIENT)
Dept: COLORECTAL SURGERY | Facility: CLINIC | Age: 57
End: 2022-09-13

## 2022-09-13 VITALS — TEMPERATURE: 100.7 F

## 2022-09-13 PROCEDURE — 99024 POSTOP FOLLOW-UP VISIT: CPT

## 2022-09-13 NOTE — HISTORY OF PRESENT ILLNESS
[FreeTextEntry1] : 57-year-old male status post abdominal perineal resection with rectus flap reconstruction for recurrent anal cancer after radiation for prostate and anal cancer. Patient's hospital course was complicated by left thigh abscess likely related to previous perforation in this site. A percutaneous drain was placed in this abscess. Drainage output was approximately 30-40 cc per day per patient's wife. Drain was removed by plastic surgery at office visit. Since that time patient reports increased swelling and discomfort at the site and recurrent low-grade fevers to 100.7. He is currently tolerating diet stoma is functioning without event. He reports some burning with urination.

## 2022-09-13 NOTE — ASSESSMENT
[FreeTextEntry1] : 57-year-old male status post abdominal perineal resection with rectus flap reconstruction\par -Patient with left thigh induration and swelling after removal of percutaneous drain. Patient is scheduled for interventional radiology drain checked on Thursday, however drains been removed. The area was aspirated with no purulent discharge. I am highly suspicious of reaccumulation of abscess in his left thigh likely from previous radionecrosis. Recommended patient presented to the emergency room for repeat imaging earlier and likely replacement of drain. Patient stated he would rather followup with plastic surgery tomorrow and proceed with CT scan as scheduled her drain check and possible replacement.\par -I recommended he restart his Augmentin and continue current Bactrim If he is not proceeding to the emergency room\par -Patient to contact the office with issues\par -All questions answered

## 2022-09-14 ENCOUNTER — APPOINTMENT (OUTPATIENT)
Dept: PLASTIC SURGERY | Facility: CLINIC | Age: 57
End: 2022-09-14

## 2022-09-14 VITALS
DIASTOLIC BLOOD PRESSURE: 79 MMHG | HEART RATE: 123 BPM | BODY MASS INDEX: 27.44 KG/M2 | OXYGEN SATURATION: 97 % | TEMPERATURE: 98.6 F | WEIGHT: 196 LBS | HEIGHT: 71 IN | SYSTOLIC BLOOD PRESSURE: 111 MMHG

## 2022-09-14 PROCEDURE — 99024 POSTOP FOLLOW-UP VISIT: CPT

## 2022-09-15 ENCOUNTER — RESULT REVIEW (OUTPATIENT)
Age: 57
End: 2022-09-15

## 2022-09-15 ENCOUNTER — APPOINTMENT (OUTPATIENT)
Dept: CT IMAGING | Facility: HOSPITAL | Age: 57
End: 2022-09-15

## 2022-09-15 ENCOUNTER — OUTPATIENT (OUTPATIENT)
Dept: OUTPATIENT SERVICES | Facility: HOSPITAL | Age: 57
LOS: 1 days | End: 2022-09-15

## 2022-09-15 ENCOUNTER — INPATIENT (INPATIENT)
Facility: HOSPITAL | Age: 57
LOS: 3 days | Discharge: HOME | End: 2022-09-19
Attending: PLASTIC SURGERY | Admitting: PLASTIC SURGERY

## 2022-09-15 VITALS
DIASTOLIC BLOOD PRESSURE: 79 MMHG | RESPIRATION RATE: 18 BRPM | HEIGHT: 71 IN | TEMPERATURE: 98 F | SYSTOLIC BLOOD PRESSURE: 107 MMHG | OXYGEN SATURATION: 100 % | HEART RATE: 135 BPM

## 2022-09-15 DIAGNOSIS — K62.9 DISEASE OF ANUS AND RECTUM, UNSPECIFIED: Chronic | ICD-10-CM

## 2022-09-15 DIAGNOSIS — M79.89 OTHER SPECIFIED SOFT TISSUE DISORDERS: ICD-10-CM

## 2022-09-15 DIAGNOSIS — L02.419 CUTANEOUS ABSCESS OF LIMB, UNSPECIFIED: ICD-10-CM

## 2022-09-15 LAB
ALBUMIN SERPL ELPH-MCNC: 3.3 G/DL — SIGNIFICANT CHANGE UP (ref 3.3–5)
ALP SERPL-CCNC: 130 U/L — HIGH (ref 40–120)
ALT FLD-CCNC: 20 U/L — SIGNIFICANT CHANGE UP (ref 4–41)
ANION GAP SERPL CALC-SCNC: 13 MMOL/L — SIGNIFICANT CHANGE UP (ref 7–14)
AST SERPL-CCNC: 13 U/L — SIGNIFICANT CHANGE UP (ref 4–40)
BILIRUB SERPL-MCNC: 0.3 MG/DL — SIGNIFICANT CHANGE UP (ref 0.2–1.2)
BUN SERPL-MCNC: 13 MG/DL — SIGNIFICANT CHANGE UP (ref 7–23)
CALCIUM SERPL-MCNC: 9.9 MG/DL — SIGNIFICANT CHANGE UP (ref 8.4–10.5)
CHLORIDE SERPL-SCNC: 99 MMOL/L — SIGNIFICANT CHANGE UP (ref 98–107)
CO2 SERPL-SCNC: 26 MMOL/L — SIGNIFICANT CHANGE UP (ref 22–31)
CREAT SERPL-MCNC: 0.77 MG/DL — SIGNIFICANT CHANGE UP (ref 0.5–1.3)
EGFR: 105 ML/MIN/1.73M2 — SIGNIFICANT CHANGE UP
GLUCOSE SERPL-MCNC: 139 MG/DL — HIGH (ref 70–99)
HCT VFR BLD CALC: 30.9 % — LOW (ref 39–50)
HGB BLD-MCNC: 9.3 G/DL — LOW (ref 13–17)
MAGNESIUM SERPL-MCNC: 2.2 MG/DL — SIGNIFICANT CHANGE UP (ref 1.6–2.6)
MCHC RBC-ENTMCNC: 26.9 PG — LOW (ref 27–34)
MCHC RBC-ENTMCNC: 30.1 GM/DL — LOW (ref 32–36)
MCV RBC AUTO: 89.3 FL — SIGNIFICANT CHANGE UP (ref 80–100)
NRBC # BLD: 0 /100 WBCS — SIGNIFICANT CHANGE UP (ref 0–0)
NRBC # FLD: 0 K/UL — SIGNIFICANT CHANGE UP (ref 0–0)
PLATELET # BLD AUTO: 510 K/UL — HIGH (ref 150–400)
POTASSIUM SERPL-MCNC: 4.4 MMOL/L — SIGNIFICANT CHANGE UP (ref 3.5–5.3)
POTASSIUM SERPL-SCNC: 4.4 MMOL/L — SIGNIFICANT CHANGE UP (ref 3.5–5.3)
PROT SERPL-MCNC: 7.9 G/DL — SIGNIFICANT CHANGE UP (ref 6–8.3)
RBC # BLD: 3.46 M/UL — LOW (ref 4.2–5.8)
RBC # FLD: 16.5 % — HIGH (ref 10.3–14.5)
SARS-COV-2 RNA SPEC QL NAA+PROBE: SIGNIFICANT CHANGE UP
SODIUM SERPL-SCNC: 138 MMOL/L — SIGNIFICANT CHANGE UP (ref 135–145)
WBC # BLD: 13.16 K/UL — HIGH (ref 3.8–10.5)
WBC # FLD AUTO: 13.16 K/UL — HIGH (ref 3.8–10.5)

## 2022-09-15 PROCEDURE — 99285 EMERGENCY DEPT VISIT HI MDM: CPT

## 2022-09-15 PROCEDURE — 93010 ELECTROCARDIOGRAM REPORT: CPT

## 2022-09-15 PROCEDURE — 72192 CT PELVIS W/O DYE: CPT | Mod: 26

## 2022-09-15 PROCEDURE — 73701 CT LOWER EXTREMITY W/DYE: CPT | Mod: 26,LT

## 2022-09-15 RX ORDER — METOPROLOL TARTRATE 50 MG
50 TABLET ORAL DAILY
Refills: 0 | Status: DISCONTINUED | OUTPATIENT
Start: 2022-09-15 | End: 2022-09-19

## 2022-09-15 RX ORDER — ATORVASTATIN CALCIUM 80 MG/1
10 TABLET, FILM COATED ORAL DAILY
Refills: 0 | Status: DISCONTINUED | OUTPATIENT
Start: 2022-09-15 | End: 2022-09-15

## 2022-09-15 RX ORDER — AMLODIPINE BESYLATE 2.5 MG/1
5 TABLET ORAL DAILY
Refills: 0 | Status: DISCONTINUED | OUTPATIENT
Start: 2022-09-15 | End: 2022-09-19

## 2022-09-15 RX ORDER — GABAPENTIN 400 MG/1
300 CAPSULE ORAL THREE TIMES A DAY
Refills: 0 | Status: DISCONTINUED | OUTPATIENT
Start: 2022-09-15 | End: 2022-09-15

## 2022-09-15 RX ORDER — PIPERACILLIN AND TAZOBACTAM 4; .5 G/20ML; G/20ML
3.38 INJECTION, POWDER, LYOPHILIZED, FOR SOLUTION INTRAVENOUS ONCE
Refills: 0 | Status: COMPLETED | OUTPATIENT
Start: 2022-09-15 | End: 2022-09-15

## 2022-09-15 RX ORDER — POLYETHYLENE GLYCOL 3350 17 G/17G
17 POWDER, FOR SOLUTION ORAL
Refills: 0 | Status: DISCONTINUED | OUTPATIENT
Start: 2022-09-15 | End: 2022-09-19

## 2022-09-15 RX ORDER — ASPIRIN/CALCIUM CARB/MAGNESIUM 324 MG
81 TABLET ORAL DAILY
Refills: 0 | Status: DISCONTINUED | OUTPATIENT
Start: 2022-09-15 | End: 2022-09-19

## 2022-09-15 RX ORDER — SENNA PLUS 8.6 MG/1
2 TABLET ORAL AT BEDTIME
Refills: 0 | Status: DISCONTINUED | OUTPATIENT
Start: 2022-09-15 | End: 2022-09-15

## 2022-09-15 RX ORDER — ALPRAZOLAM 0.25 MG
0.5 TABLET ORAL THREE TIMES A DAY
Refills: 0 | Status: DISCONTINUED | OUTPATIENT
Start: 2022-09-15 | End: 2022-09-19

## 2022-09-15 RX ORDER — ACETAMINOPHEN 500 MG
975 TABLET ORAL EVERY 6 HOURS
Refills: 0 | Status: DISCONTINUED | OUTPATIENT
Start: 2022-09-15 | End: 2022-09-19

## 2022-09-15 RX ORDER — SODIUM CHLORIDE 9 MG/ML
1000 INJECTION, SOLUTION INTRAVENOUS
Refills: 0 | Status: DISCONTINUED | OUTPATIENT
Start: 2022-09-15 | End: 2022-09-16

## 2022-09-15 RX ORDER — IBUPROFEN 200 MG
400 TABLET ORAL EVERY 6 HOURS
Refills: 0 | Status: DISCONTINUED | OUTPATIENT
Start: 2022-09-15 | End: 2022-09-19

## 2022-09-15 RX ORDER — HYDROMORPHONE HYDROCHLORIDE 2 MG/ML
1 INJECTION INTRAMUSCULAR; INTRAVENOUS; SUBCUTANEOUS ONCE
Refills: 0 | Status: DISCONTINUED | OUTPATIENT
Start: 2022-09-15 | End: 2022-09-15

## 2022-09-15 RX ORDER — OLANZAPINE 15 MG/1
10 TABLET, FILM COATED ORAL AT BEDTIME
Refills: 0 | Status: DISCONTINUED | OUTPATIENT
Start: 2022-09-15 | End: 2022-09-19

## 2022-09-15 RX ORDER — LANOLIN ALCOHOL/MO/W.PET/CERES
3 CREAM (GRAM) TOPICAL AT BEDTIME
Refills: 0 | Status: DISCONTINUED | OUTPATIENT
Start: 2022-09-15 | End: 2022-09-19

## 2022-09-15 RX ORDER — ATORVASTATIN CALCIUM 80 MG/1
10 TABLET, FILM COATED ORAL AT BEDTIME
Refills: 0 | Status: DISCONTINUED | OUTPATIENT
Start: 2022-09-15 | End: 2022-09-19

## 2022-09-15 RX ORDER — OXYCODONE HYDROCHLORIDE 5 MG/1
10 TABLET ORAL EVERY 4 HOURS
Refills: 0 | Status: DISCONTINUED | OUTPATIENT
Start: 2022-09-15 | End: 2022-09-19

## 2022-09-15 RX ORDER — BICTEGRAVIR SODIUM, EMTRICITABINE, AND TENOFOVIR ALAFENAMIDE FUMARATE 30; 120; 15 MG/1; MG/1; MG/1
1 TABLET ORAL DAILY
Refills: 0 | Status: DISCONTINUED | OUTPATIENT
Start: 2022-09-15 | End: 2022-09-19

## 2022-09-15 RX ORDER — SENNA PLUS 8.6 MG/1
2 TABLET ORAL AT BEDTIME
Refills: 0 | Status: DISCONTINUED | OUTPATIENT
Start: 2022-09-15 | End: 2022-09-19

## 2022-09-15 RX ORDER — PIPERACILLIN AND TAZOBACTAM 4; .5 G/20ML; G/20ML
3.38 INJECTION, POWDER, LYOPHILIZED, FOR SOLUTION INTRAVENOUS EVERY 8 HOURS
Refills: 0 | Status: DISCONTINUED | OUTPATIENT
Start: 2022-09-16 | End: 2022-09-19

## 2022-09-15 RX ORDER — ZOLPIDEM TARTRATE 10 MG/1
5 TABLET ORAL AT BEDTIME
Refills: 0 | Status: DISCONTINUED | OUTPATIENT
Start: 2022-09-15 | End: 2022-09-19

## 2022-09-15 RX ORDER — POLYETHYLENE GLYCOL 3350 17 G/17G
17 POWDER, FOR SOLUTION ORAL
Refills: 0 | Status: DISCONTINUED | OUTPATIENT
Start: 2022-09-15 | End: 2022-09-15

## 2022-09-15 RX ORDER — INFLUENZA VIRUS VACCINE 15; 15; 15; 15 UG/.5ML; UG/.5ML; UG/.5ML; UG/.5ML
0.5 SUSPENSION INTRAMUSCULAR ONCE
Refills: 0 | Status: DISCONTINUED | OUTPATIENT
Start: 2022-09-15 | End: 2022-09-19

## 2022-09-15 RX ORDER — OXYCODONE HYDROCHLORIDE 5 MG/1
5 TABLET ORAL EVERY 4 HOURS
Refills: 0 | Status: DISCONTINUED | OUTPATIENT
Start: 2022-09-15 | End: 2022-09-19

## 2022-09-15 RX ADMIN — Medication 975 MILLIGRAM(S): at 17:10

## 2022-09-15 RX ADMIN — HYDROMORPHONE HYDROCHLORIDE 1 MILLIGRAM(S): 2 INJECTION INTRAMUSCULAR; INTRAVENOUS; SUBCUTANEOUS at 16:48

## 2022-09-15 RX ADMIN — Medication 400 MILLIGRAM(S): at 17:10

## 2022-09-15 RX ADMIN — PIPERACILLIN AND TAZOBACTAM 200 GRAM(S): 4; .5 INJECTION, POWDER, LYOPHILIZED, FOR SOLUTION INTRAVENOUS at 17:05

## 2022-09-15 RX ADMIN — PIPERACILLIN AND TAZOBACTAM 25 GRAM(S): 4; .5 INJECTION, POWDER, LYOPHILIZED, FOR SOLUTION INTRAVENOUS at 21:51

## 2022-09-15 RX ADMIN — OXYCODONE HYDROCHLORIDE 10 MILLIGRAM(S): 5 TABLET ORAL at 21:48

## 2022-09-15 NOTE — ED ADULT NURSE REASSESSMENT NOTE - NS ED NURSE REASSESS COMMENT FT1
report rcd from rn rox. pt a&ox4 ambulatory with cane c/o pain consistent with chief complaint. pt s/p rectal surgery with swelling to area. pt skin breakdown. abdomen soft and nondistended. ostomy site clean and in tact. pt respirations even and unlabored with no accessory muscle use. 18g to the LAC with no redness or swelling. pt in NAD and resting in stretcher.\

## 2022-09-15 NOTE — ED PROVIDER NOTE - CADM POA PRESS ULCER
81M with hx prostate CA, HLD, HTN, PAD, renal artery stenosis s/p left ptra, CKD stage 3-4, hx of vertigo who presents from home with emesis and dizziness No

## 2022-09-15 NOTE — CONSULT NOTE ADULT - ASSESSMENT
57y Male with Hx rectal/prostate cancer s/p radiation and colostomy, lower GIB, now s/p APR with end colostomy creation, VRAM flap closure on 8/24 c/b L thigh collection that was drained and re-accumulated. Pt sent to ED for collection drainage with interventional radiology.    - no acute intervention from colorectal surgery standpoint  - CT L thigh with IV contrast  - IR consult, will be scheduled for drainage per IR tomorrow  - Reg diet as tolerated. NPO @ midnight if IR procedure    Discussed with Dr. Serafin Bhakta PGY3  A team surgery  [id number] 57y Male with Hx rectal/prostate cancer s/p radiation and colostomy, lower GIB, now s/p APR with end colostomy creation, VRAM flap closure on 8/24 c/b L thigh collection that was drained and re-accumulated. Pt sent to ED for collection drainage with interventional radiology.    - no acute intervention from colorectal surgery standpoint, ostomy functioning well  - CT L thigh with IV contrast  - IR consult, will be scheduled for drainage per IR tomorrow  - Reg diet as tolerated. NPO @ midnight if IR procedure    Discussed with Dr. Serafin Bhakta PGY3  A team surgery  [id number]

## 2022-09-15 NOTE — CONSULT NOTE ADULT - SUBJECTIVE AND OBJECTIVE BOX
CC: 57y old Male admitted with a chief complaint of IR Drain, now    HPI: 57y Male with Hx tachycardia, HIV, DMII, rectal/prostate cancer s/p radiation and colostomy, lower GIB, now s/p APR with end colostomy creation, VRAM flap closure on 8/24 presents for drainage of collection. Patient initially developed L thigh collection post-operatively, which was drained during admission by interventional radiology on 8/30 with the drain left in.  Patient was initially discharged on 9/2 and seen outpatient, where a the drain was removed. Patient developed recollection and could not tolerate outpatient drainage, and was advised to come to the emergency room and for admission for collection drainage by interventional radiology. Patient complains of thigh tenderness, but denies other symptoms including fever, nausea, PO tolerance.     PMHx: DM (diabetes mellitus)  HTN (hypertension)  HLD (hyperlipidemia)  HIV infection  Depressive disorder  Anxiety  Prostate cancer  Anal cancer  Diverticulosis      PSHx: No significant past surgical history  Anal lesion      Medications (inpatient): lactated ringers. 1000 milliLiter(s) IV Continuous <Continuous>    Medications (PRN):  Allergies: Allergy Status Unknown  (Intolerances: )  Social Hx:   Family Hx: No pertinent family history in first degree relatives        Physical Exam  T(C): 36.9  HR: 135 (135 - 135)  BP: 107/79 (107/79 - 107/79)  RR: 18 (18 - 18)  SpO2: 100% (100% - 100%)  Tmax: T(C): , Max: 36.9 (09-15-22 @ 13:18)    General: well developed, well nourished, NAD  Neuro: alert and oriented, no focal deficits, moves all extremities spontaneously  HEENT: NCAT, EOMI, anicteric, mucosa moist  Respiratory: airway patent, respirations unlabored  CVS: regular rate and rhythm  Abdomen: soft, nontender, nondistended  Extremities: no edema, sensation and movement grossly intact  Skin: warm, dry, appropriate color    Labs:            Imaging and other studies: CC: 57y old Male admitted with a chief complaint of IR Drain, now    HPI: 57y Male with Hx tachycardia, HIV, DMII, rectal/prostate cancer s/p radiation and colostomy, lower GIB, now s/p APR with end colostomy creation, VRAM flap closure on 8/24 presents for drainage of collection. Patient initially developed L thigh collection post-operatively, which was drained during admission by interventional radiology on 8/30 with the drain left in.  Patient was initially discharged on 9/2 and seen outpatient, where a the drain was removed. Patient developed recollection and could not tolerate outpatient drainage, and was advised to come to the emergency room and for admission for collection drainage by interventional radiology. Patient complains of thigh tenderness, but denies other symptoms including fever, nausea, PO tolerance.     PMHx: DM (diabetes mellitus)  HTN (hypertension)  HLD (hyperlipidemia)  HIV infection  Depressive disorder  Anxiety  Prostate cancer  Anal cancer  Diverticulosis      PSHx: No significant past surgical history  Anal lesion      Medications (inpatient): lactated ringers. 1000 milliLiter(s) IV Continuous <Continuous>    Medications (PRN):  Allergies: Allergy Status Unknown  (Intolerances: )  Social Hx:   Family Hx: No pertinent family history in first degree relatives        Physical Exam  T(C): 36.9  HR: 135 (135 - 135)  BP: 107/79 (107/79 - 107/79)  RR: 18 (18 - 18)  SpO2: 100% (100% - 100%)  Tmax: T(C): , Max: 36.9 (09-15-22 @ 13:18)    General: well developed, well nourished, NAD  Neuro: alert and oriented, no focal deficits, moves all extremities spontaneously  HEENT: NCAT, EOMI, anicteric, mucosa moist  Respiratory: airway patent, respirations unlabored  CVS: regular rate and rhythm  Abdomen: soft, nontender, nondistended, ostomy pink and functioning  Extremities: left medial upper thigh indurated and painful, no drainage  Skin: warm, dry, appropriate color    Labs:      Imaging and other studies:

## 2022-09-15 NOTE — ED ADULT TRIAGE NOTE - CHIEF COMPLAINT QUOTE
Pt presents to ED via wheelchair from IR. Pt was scheduled for outpatient CT Scan today but pt is to be admitted for drain placement for collection to L thigh. Pt has hx of rectal CA. Glo in IR (Spectralink [id number]) came down with patient and advised pt is to be admitted to plastics (Pager [phone]).

## 2022-09-15 NOTE — ED PROVIDER NOTE - OBJECTIVE STATEMENT
57y Male with Hx tachycardia, HIV, DMII, rectal/prostate cancer s/p radiation and colostomy, lower GIB, now s/p APR with end colostomy creation, VRAM flap closure on 8/24 presents for drainage of collection. Patient initially developed L thigh collection post-operatively, which was drained during admission by interventional radiology on 8/30 with the drain left in.  Patient was initially discharged on 9/2 and seen outpatient, where a the drain was removed. Patient developed recollection and could not tolerate outpatient drainage, and was advised to come to the emergency room and for admission for collection drainage by interventional radiology. Endorses fevers since yesterday but no CP, SOB, abd pain, NV. Colostomy bag draining stools. +urinary incontinence since prior admit.

## 2022-09-15 NOTE — ED ADULT NURSE NOTE - OBJECTIVE STATEMENT
Received pt in bed A and OX  3in NAD resting comfortably, c/o rectal pain and swelling, s/p rectal surgery and bulb drain removal, swelling noted ot the affected area , no exudate, skin breakdown, site is red, pt with colostomy bad to mid abdominal area, site is clean, soft formed stools noted,  no redness or leakage to surrounding area. abd soft non distended non tender, pt's skin color is pale for ethnicity. orders noted and completed.

## 2022-09-15 NOTE — ED PROVIDER NOTE - PHYSICAL EXAMINATION
CONSTITUTIONAL: NAD, tired appearing  SKIN: warm dry; cresent of swelling, induration at innerfold of L thigh about ~12" long TTP w/ warmth but not significantly erythematous, +induration in site  HEAD: NCAT  EYES: NL inspection  ENT: MMM  NECK: Supple  CARD: RRR  RESP: CTAB  ABD: S/NT no R/G; +colostomy bag in place, +rectal prolapse noted  : no lesions on testicles/penile shaft noted  EXT: no pedal edema  NEURO: Grossly unremarkable  PSYCH: Cooperative, appropriate.

## 2022-09-15 NOTE — H&P ADULT - BIRTH SEX
Problem: Patient Care Overview  Goal: Plan of Care Review  Outcome: Ongoing (interventions implemented as appropriate)  POC reviewed with pt at bedside. Pt verbalized understanding. Questions and concerns addressed. Pt had one episode of wheezing and requested her PRN breathing treatment. After the breathing treatment the wheezing had gone away. Pt progressing toward goals. Will continue to monitor. See flowsheets for full assessment and VS info            Male

## 2022-09-15 NOTE — ED ADULT NURSE NOTE - CHIEF COMPLAINT QUOTE
Pt presents to ED via wheelchair from IR. Pt was scheduled for outpatient CT Scan today but pt is to be admitted for drain placement for collection to L thigh. Pt has hx of rectal CA. Glo in IR (Spectralink [id number]) came down with patient and advised pt is to be admitted to plastics (Pager [phone]).

## 2022-09-15 NOTE — ED PROVIDER NOTE - CLINICAL SUMMARY MEDICAL DECISION MAKING FREE TEXT BOX
Genaro PGY2: 57y Male with Hx tachycardia, HIV, DMII, rectal/prostate cancer s/p radiation and colostomy, lower GIB, now s/p APR with end colostomy creation, VRAM flap closure on 8/24 presents for drainage of collection. Plastics ordered labs and CT scans. They want admission to Dr Riggs for drainage tube tmw. Genaro PGY2: 57y Male with Hx tachycardia, HIV, DMII, rectal/prostate cancer s/p radiation and colostomy, lower GIB, now s/p APR with end colostomy creation, VRAM flap closure on 8/24 presents for drainage of collection. Plastics ordered labs and CT scans. They want admission to Dr Riggs for drainage tube tmw. abx as per Plastics , cultures sent

## 2022-09-15 NOTE — ED PROVIDER NOTE - PROGRESS NOTE DETAILS
Genaro PGY2: spoke to plastics and IR. Admit to Dr Riggs. Made them aware of fever - offered BCx but they declined the need for it at this time.

## 2022-09-15 NOTE — ED PROVIDER NOTE - NS ED ROS FT
Constitutional:  See HPI; +fever  Eyes:  No visual changes  ENMT: No neck pain or stiffness  Cardiac:  No chest pain  Respiratory:  No cough or respiratory distress.   GI:  No nausea, vomiting, diarrhea or abdominal pain. +rectal prolapse at baseline, no perineal pain  :  no dysuria, +incontinence since prior admission/davis  MS:  No back pain.  Neuro:  No headache   Skin:  +L thigh fluid collection, TTP   Except as documented in the HPI,  all other systems are negative

## 2022-09-15 NOTE — ED PROVIDER NOTE - ATTENDING CONTRIBUTION TO CARE
58 y/o M with PMH HIV, DMII, rectal/prostate cancer s/p radiation and colostomy,  s/p APR with end colostomy creation, VRAM flap closure on 8/24 p/w drainage from flap . pt had IR placed drain on  8/30 with the drain for post operative fluid collection w/ subsequent drain removal outpatient p/w increased drainage. She denies diarrhea, dysuria, He states he had a low grade fever last night. Pt sent in by plastics for IR driange of fluid collections.  denies fever, +chills, chest pain, SOB, abdominal pain, diarrhea, dysuria, syncope, bleeding, new rash,weakness, numbness, blurred vision  + drainage   ROS  otherwise negative as per HPI  Gen: Awake, Alert, WD, WN, NAD  Head:  NC/AT  Eyes:  PERRL, EOMI, Conjunctiva pink, lids normal, no scleral icterus  ENT: moist mucus membranes  Neck: trachea midline  Cardiac/CV:  S1 S2, RRR, no M/G/R  Respiratory/Pulm:  CTAB, good air movement,  Gastrointestinal/Abdomen:  Soft, nontender, colostomy     Ext:  warm, well perfused, moving all extremities spontaneously, no peripheral edema, distal pulses intact  Skin:  cresent of swelling, induration at innerfold of L thigh about ~12" long TTP w/ warmth but not significantly erythematous, +induration in site  Neuro:  AAOx3, sensation intact, motor 5/5 x 4 extremities,   mdm as above

## 2022-09-15 NOTE — ED CLERICAL - NS ED CLERK NOTE PRE-ARRIVAL INFORMATION; ADDITIONAL PRE-ARRIVAL INFORMATION
This patient is enrolled in the [facility] readmission reduction program and has active care navigation. This patient can be followed up by the care navigation team within 24 hours. To arrange close follow-up or to obtain additional clinical information about this patient, please call the contact number above.

## 2022-09-15 NOTE — H&P ADULT - ASSESSMENT
57y Male with Hx rectal/prostate cancer s/p radiation and colostomy, lower GIB, now s/p APR with end colostomy creation, VRAM flap closure on 8/24 c/b L thigh collection that was drained and re-accumulated. Pt sent to ED for collection drainage with interventional radiology.    - flap healthy-appearing, daily dressing change  - CT L thigh with IV contrast  - IR consult, will be scheduled for drainage per IR tomorrow  - Home medications  - F/u CBC, CMP, coags  - NPO @ midnight  - to discuss with Dr. Riggs    PRS  [id number] 57y Male with Hx rectal/prostate cancer s/p radiation and colostomy, lower GIB, now s/p APR with end colostomy creation, VRAM flap closure on 8/24 c/b L thigh collection that was drained and re-accumulated. Pt sent to ED for collection drainage with interventional radiology.    - flap healthy-appearing, daily baci  - CT L thigh with IV contrast  - IR consult, will be scheduled for drainage per IR tomorrow  - colorectal consult  - urology consult for incontinence  - Admit to plastic surgery, Dr. Riggs  - Home medications  - F/u CBC, CMP, coags  - NPO @ midnight  - to discuss with Dr. Riggs    PRS  [id number] 57y Male with Hx rectal/prostate cancer s/p radiation and colostomy, lower GIB, now s/p APR with end colostomy creation, VRAM flap closure on 8/24 c/b L thigh collection that was drained and re-accumulated. Pt sent to ED for collection drainage with interventional radiology.    - flap healthy-appearing, daily baci  - CT L thigh with IV contrast  - IR consult, will be scheduled for drainage per IR tomorrow  - Hold lovenox per IR  - colorectal consult  - urology consult for incontinence  - Admit to plastic surgery, Dr. Riggs  - Home medications  - F/u CBC, CMP, coags  - NPO @ midnight  - to discuss with Dr. Riggs    PRS  [id number]

## 2022-09-15 NOTE — CHART NOTE - NSCHARTNOTEFT_GEN_A_CORE
Patient presented to IR as outpatient for CT noncontrast / tube check of left thigh collection drain. Patient with history of rectal/prostate cancer s/p radiation and colostomy, lower GIB, now s/p APR with end colostomy creation, VRAM flap closure on 8/24 complicated by left thigh collection status post IR drain placement (8/31) now removed in plastic surgery office. Wife states patient was having ~60cc daily output before drain was removed. Since removal, patient experiencing leg pain and fevers. Completed outpatient abx course. CT pelvis noncontrast (9/15) demonstrating small persistent residual left thigh collection. Patient brought to ER for admission to plastic surgery service.     Plan:  -Please obtain CT L thigh with IV contrast   -Place IR procedure under Dr. Pitts for left thigh drain placement pending CT results - tentatively over 9/16/22   -pre-IR note   -9/16 4AM CBC/CMP/Coags   -NPO P MN on 9/15  -COVID swab within 5 days  -D/w plastics     [id number]

## 2022-09-15 NOTE — PATIENT PROFILE ADULT - FALL HARM RISK - RISK INTERVENTIONS

## 2022-09-15 NOTE — CHART NOTE - NSCHARTNOTEFT_GEN_A_CORE
PRE-INTERVENTIONAL RADIOLOGY PROCEDURE NOTE      Patient Age: 58 yo    Patient Gender: M    Procedure: Left thigh IR drain placement    Diagnosis/Indication: Left thigh abscess    Interventional Radiology Attending Physician: Dr. Quijano    Pertinent Medical History: s/p APR with VRAM with IR drain placed. IR drain removed on 9/12. Patient became febrile and tender at left thigh. Concern for reaccumulation of abscess.    Pertinent labs:                    Patient and Family Aware ? Yes PRE-INTERVENTIONAL RADIOLOGY PROCEDURE NOTE      Patient Age: 58 yo    Patient Gender: M    Procedure: Left thigh IR drain placement    Diagnosis/Indication: Left thigh abscess    Interventional Radiology Attending Physician: Dr. Quijano    Pertinent Medical History: s/p APR with VRAM with IR drain placed. IR drain removed on 9/12. Patient became febrile and tender at left thigh. Concern for reaccumulation of abscess.    Pertinent labs:                        9.3    13.16 )-----------( 510      ( 15 Sep 2022 16:30 )             30.9   09-15    138  |  99  |  13  ----------------------------<  139<H>  4.4   |  26  |  0.77    Ca    9.9      15 Sep 2022 16:30  Mg     2.20     09-15    TPro  7.9  /  Alb  3.3  /  TBili  0.3  /  DBili  x   /  AST  13  /  ALT  20  /  AlkPhos  130<H>  09-15                    Patient and Family Aware ? Yes

## 2022-09-15 NOTE — H&P ADULT - HISTORY OF PRESENT ILLNESS
57y Male with Hx tachycardia, HIV, DMII, rectal/prostate cancer s/p radiation and colostomy, lower GIB, now s/p APR with end colostomy creation, VRAM flap closure on 8/24 presents for drainage of collection. Patient initially developed L thigh collection post-operatively, which was drained during admission by interventional radiology on 8/30 with the drain left in.  Patient was initially discharged on 9/2 and seen outpatient, where a the drain was removed. Patient developed recollection and could not tolerate outpatient drainage, and was advised to come to the emergency room and for admission for collection drainage by interventional radiology. Patient complains of thigh tenderness, but denies other symptoms including fever, nausea, PO tolerance.  57y Male with Hx tachycardia, HIV, DMII, rectal/prostate cancer s/p radiation and colostomy, lower GIB, now s/p APR with end colostomy creation, VRAM flap closure on 8/24 presents for drainage of collection. Patient initially developed L thigh collection post-operatively, which was drained during admission by interventional radiology on 8/30 with the drain left in.  Patient was initially discharged on 9/2 and seen outpatient, where a the drain was removed. Patient developed recollection and could not tolerate outpatient drainage, and was advised to come to the emergency room and for admission for collection drainage by interventional radiology. Patient complains of thigh tenderness and also notes urinary incontinence since discharge, but denies other symptoms including fever, nausea, PO tolerance.                   PAST MEDICAL & SURGICAL HISTORY:  HTN (hypertension)      HLD (hyperlipidemia)      HIV infection      Depressive disorder      Anxiety      Prostate cancer      Anal cancer      Diverticulosis      Anal lesion          Home Medications:  ALPRAZolam 0.5 mg oral tablet: 1 tab(s) orally 3 times a day, As Needed (24 Aug 2022 12:58)  ALPRAZolam 1 mg oral tablet: 1 tab(s) orally once a day, As needed, anxiety (24 Aug 2022 12:59)  amLODIPine 5 mg oral tablet: 1 tab(s) orally once a day (24 Aug 2022 12:59)  aspirin 81 mg oral tablet: orally once a day (24 Aug 2022 12:59)  atorvastatin 10 mg oral tablet: 1 tab(s) orally once a day (24 Aug 2022 12:59)  bictegravir/emtricitabine/tenofovir 50 mg-200 mg-25 mg oral tablet: 1 tab(s) orally once a day (24 Aug 2022 12:59)  Biktarvy 30 mg-120 mg-15 mg oral tablet: 1 tab(s) orally once a day (24 Aug 2022 12:59)  metFORMIN:  (24 Aug 2022 12:58)  OLANZapine 10 mg oral tablet: 1 tab(s) orally once a day (at bedtime) (24 Aug 2022 12:59)  OLANZapine 5 mg oral tablet: 1 tab(s) orally once a day (24 Aug 2022 12:59)  polyethylene glycol 3350 oral powder for reconstitution: 17 gram(s) orally 2 times a day (24 Aug 2022 12:59)  senna oral tablet: 2 tab(s) orally once a day (at bedtime) (24 Aug 2022 12:59)  zolpidem 10 mg oral tablet: 1 tab(s) orally once a day (at bedtime) (24 Aug 2022 12:59)          Vitals (Last 12 Hours)  T(F): 98.3 (09-15-22 @ 14:54), Max: 98.5 (09-15-22 @ 13:18)  HR:  (112 - 135)  BP:  (107/79 - 115/68)  RR:  (16 - 18)  SpO2:  (100% - 100%)        PHYSICAL EXAM:  General: NAD, resting comfortably  Pulmonary: Nonlabored breathing, no respiratory distress  Abdominal: Incision is clean, dry, intact, abd soft, NT, ostomy bag place  Buttock: flap viable, good flap refill, incision c/d/i  Extremities: LLE: groin/upper medial thigh with palpable collection, induraction               LABS:                    CAPILLARY BLOOD GLUCOSE        CAPILLARY BLOOD GLUCOSE            Microbiology:        RADIOLOGY & ADDITIONAL STUDIES:

## 2022-09-16 LAB
ALBUMIN SERPL ELPH-MCNC: 3.1 G/DL — LOW (ref 3.3–5)
ALP SERPL-CCNC: 112 U/L — SIGNIFICANT CHANGE UP (ref 40–120)
ALT FLD-CCNC: 19 U/L — SIGNIFICANT CHANGE UP (ref 4–41)
ANION GAP SERPL CALC-SCNC: 9 MMOL/L — SIGNIFICANT CHANGE UP (ref 7–14)
APTT BLD: 27 SEC — SIGNIFICANT CHANGE UP (ref 27–36.3)
AST SERPL-CCNC: 14 U/L — SIGNIFICANT CHANGE UP (ref 4–40)
BILIRUB SERPL-MCNC: 0.2 MG/DL — SIGNIFICANT CHANGE UP (ref 0.2–1.2)
BUN SERPL-MCNC: 16 MG/DL — SIGNIFICANT CHANGE UP (ref 7–23)
CALCIUM SERPL-MCNC: 9.2 MG/DL — SIGNIFICANT CHANGE UP (ref 8.4–10.5)
CHLORIDE SERPL-SCNC: 99 MMOL/L — SIGNIFICANT CHANGE UP (ref 98–107)
CO2 SERPL-SCNC: 27 MMOL/L — SIGNIFICANT CHANGE UP (ref 22–31)
CREAT SERPL-MCNC: 0.74 MG/DL — SIGNIFICANT CHANGE UP (ref 0.5–1.3)
EGFR: 106 ML/MIN/1.73M2 — SIGNIFICANT CHANGE UP
GLUCOSE SERPL-MCNC: 99 MG/DL — SIGNIFICANT CHANGE UP (ref 70–99)
HCT VFR BLD CALC: 27.1 % — LOW (ref 39–50)
HGB BLD-MCNC: 8.2 G/DL — LOW (ref 13–17)
INR BLD: 1.35 RATIO — HIGH (ref 0.88–1.16)
MCHC RBC-ENTMCNC: 26.7 PG — LOW (ref 27–34)
MCHC RBC-ENTMCNC: 30.3 GM/DL — LOW (ref 32–36)
MCV RBC AUTO: 88.3 FL — SIGNIFICANT CHANGE UP (ref 80–100)
NRBC # BLD: 0 /100 WBCS — SIGNIFICANT CHANGE UP (ref 0–0)
NRBC # FLD: 0 K/UL — SIGNIFICANT CHANGE UP (ref 0–0)
PLATELET # BLD AUTO: 452 K/UL — HIGH (ref 150–400)
POTASSIUM SERPL-MCNC: 3.8 MMOL/L — SIGNIFICANT CHANGE UP (ref 3.5–5.3)
POTASSIUM SERPL-SCNC: 3.8 MMOL/L — SIGNIFICANT CHANGE UP (ref 3.5–5.3)
PROT SERPL-MCNC: 7.2 G/DL — SIGNIFICANT CHANGE UP (ref 6–8.3)
PROTHROM AB SERPL-ACNC: 15.7 SEC — HIGH (ref 10.5–13.4)
RBC # BLD: 3.07 M/UL — LOW (ref 4.2–5.8)
RBC # FLD: 16.5 % — HIGH (ref 10.3–14.5)
SODIUM SERPL-SCNC: 135 MMOL/L — SIGNIFICANT CHANGE UP (ref 135–145)
WBC # BLD: 9.01 K/UL — SIGNIFICANT CHANGE UP (ref 3.8–10.5)
WBC # FLD AUTO: 9.01 K/UL — SIGNIFICANT CHANGE UP (ref 3.8–10.5)

## 2022-09-16 PROCEDURE — 49406 IMAGE CATH FLUID PERI/RETRO: CPT

## 2022-09-16 PROCEDURE — 99222 1ST HOSP IP/OBS MODERATE 55: CPT

## 2022-09-16 RX ORDER — OXYCODONE HYDROCHLORIDE 5 MG/1
5 TABLET ORAL ONCE
Refills: 0 | Status: DISCONTINUED | OUTPATIENT
Start: 2022-09-16 | End: 2022-09-16

## 2022-09-16 RX ORDER — TAMSULOSIN HYDROCHLORIDE 0.4 MG/1
0.4 CAPSULE ORAL AT BEDTIME
Refills: 0 | Status: DISCONTINUED | OUTPATIENT
Start: 2022-09-16 | End: 2022-09-19

## 2022-09-16 RX ADMIN — Medication 400 MILLIGRAM(S): at 21:27

## 2022-09-16 RX ADMIN — SODIUM CHLORIDE 100 MILLILITER(S): 9 INJECTION, SOLUTION INTRAVENOUS at 01:51

## 2022-09-16 RX ADMIN — PIPERACILLIN AND TAZOBACTAM 25 GRAM(S): 4; .5 INJECTION, POWDER, LYOPHILIZED, FOR SOLUTION INTRAVENOUS at 06:00

## 2022-09-16 RX ADMIN — Medication 81 MILLIGRAM(S): at 12:02

## 2022-09-16 RX ADMIN — ATORVASTATIN CALCIUM 10 MILLIGRAM(S): 80 TABLET, FILM COATED ORAL at 21:29

## 2022-09-16 RX ADMIN — TAMSULOSIN HYDROCHLORIDE 0.4 MILLIGRAM(S): 0.4 CAPSULE ORAL at 21:29

## 2022-09-16 RX ADMIN — Medication 50 MILLIGRAM(S): at 10:15

## 2022-09-16 RX ADMIN — OLANZAPINE 10 MILLIGRAM(S): 15 TABLET, FILM COATED ORAL at 21:28

## 2022-09-16 RX ADMIN — OXYCODONE HYDROCHLORIDE 5 MILLIGRAM(S): 5 TABLET ORAL at 19:40

## 2022-09-16 RX ADMIN — Medication 975 MILLIGRAM(S): at 10:14

## 2022-09-16 RX ADMIN — PIPERACILLIN AND TAZOBACTAM 25 GRAM(S): 4; .5 INJECTION, POWDER, LYOPHILIZED, FOR SOLUTION INTRAVENOUS at 21:28

## 2022-09-16 RX ADMIN — ZOLPIDEM TARTRATE 5 MILLIGRAM(S): 10 TABLET ORAL at 21:34

## 2022-09-16 RX ADMIN — BICTEGRAVIR SODIUM, EMTRICITABINE, AND TENOFOVIR ALAFENAMIDE FUMARATE 1 TABLET(S): 30; 120; 15 TABLET ORAL at 12:01

## 2022-09-16 RX ADMIN — Medication 975 MILLIGRAM(S): at 01:51

## 2022-09-16 RX ADMIN — Medication 975 MILLIGRAM(S): at 02:52

## 2022-09-16 RX ADMIN — Medication 400 MILLIGRAM(S): at 12:01

## 2022-09-16 NOTE — ADVANCED PRACTICE NURSE CONSULT - RECOMMEDATIONS
Supplies used and ordered for patient:    3M liquid barrier film-    2" Murali moisture barrier rings- [id number]   Two piece drainable pouch - 2 3/4"= #[id number]  Flat wafer ( flat skin barrier) - 2 3/4" = #[id number]     Plan of care and supplies discussed with primary RN Shayla Lane.    Please contact Wound/Ostomy Care Service Line if we can be of further assistance (ext [phone]).

## 2022-09-16 NOTE — CONSULT NOTE ADULT - ATTENDING COMMENTS
56 yo man with HIV, DM, rectal and prostate ca s/p resection and flap closure 8/24 c/b abscess which was drained 8/30 now re-accumulating.  CT also raised possibility of ischial OM.    For IR drain today.  Agree with empiric zosyn for now while await cultures.    ID service available over weekend

## 2022-09-16 NOTE — PRE PROCEDURE NOTE - PRE PROCEDURE EVALUATION
Vascular & Interventional Radiology Pre-Procedure Note    Procedure Name: L thigh/pelvis abscess drainage    HPI: 57y Male with hx rectal cancer sp radiation and colostomy, lower GIB, now sp APR w end colostomy creation s/p abscess drainage 8/31 now with recurrent abscess presents for drainage.    Allergies: NKDA    Medications:     aspirin  chewable: 81 milliGRAM(s) Oral (09-16 @ 12:02)  bictegravir 50 mG/emtricitabine 200 mG/tenofovir alafenamide 25 mG (BIKTARVY): 1 Tablet(s) Oral (09-16 @ 12:01)  metoprolol succinate ER: 50 milliGRAM(s) Oral (09-16 @ 10:15)  piperacillin/tazobactam IVPB.: 200 mL/Hr IV Intermittent (09-15 @ 17:05)  piperacillin/tazobactam IVPB.-: 25 mL/Hr IV Intermittent (09-15 @ 21:51)  piperacillin/tazobactam IVPB..: 25 mL/Hr IV Intermittent (09-16 @ 06:00)      Data:  Vital Signs Last 24 Hrs  T(C): 36.3 (16 Sep 2022 14:43), Max: 37.1 (16 Sep 2022 09:43)  T(F): 97.4 (16 Sep 2022 14:43), Max: 98.7 (16 Sep 2022 09:43)  HR: 106 (16 Sep 2022 14:43) (102 - 111)  BP: 117/89 (16 Sep 2022 14:43) (97/82 - 117/89)  BP(mean): --  RR: 20 (16 Sep 2022 14:43) (16 - 20)  SpO2: 97% (16 Sep 2022 14:43) (97% - 100%)    Parameters below as of 16 Sep 2022 14:43  Patient On (Oxygen Delivery Method): room air        LABS:                        8.2    9.01  )-----------( 452      ( 16 Sep 2022 06:08 )             27.1     09-16    135  |  99  |  16  ----------------------------<  99  3.8   |  27  |  0.74    Ca    9.2      16 Sep 2022 06:08  Mg     2.20     09-15    TPro  7.2  /  Alb  3.1<L>  /  TBili  0.2  /  DBili  x   /  AST  14  /  ALT  19  /  AlkPhos  112  09-16    PT/INR - ( 16 Sep 2022 06:08 )   PT: 15.7 sec;   INR: 1.35 ratio         PTT - ( 16 Sep 2022 06:08 )  PTT:27.0 sec    Plan:   -57y Male presents for L thigh/pelvis abscess drainage  -Risks/Benefits/alternatives explained with the patient and witnessed informed consent obtained.

## 2022-09-16 NOTE — ADVANCED PRACTICE NURSE CONSULT - ASSESSMENT
Patient AxOx4, with detaching from skin pouch, with soft, formed stool in the pouch. Patient verbalized that he is knowledgeable on ostomy care and pouch changes, however, needed proper supplies. Patient actively participating in pouch change. Overview of surgical procedure and need of ostomy reviewed. Terms defined utilized: Ostomy, Colostomy, wafer, pouch, stoma. Frequency of pouch change and emptying discussed, teach back method utilized. Stool consistency with Colostomy reviewed. Importance of hydration reinforced.  Patient able to show back how to open, empty and close pouch. Removed ostomy pouch using pull and push technique, cleansed skin with water, patted dry. Reviewed with patient how to properly assess stoma viability and peristomal skin. Patient actively participated in stoma measurement, creating oval template, cutting wafer, applying barrier ring, applying pouch. Reviewed s/s to report to MD.    Stoma size: top to bottom 1" and side to side 1 3/4" See A&I flowsheet for full stoma assessment details.

## 2022-09-16 NOTE — PROGRESS NOTE ADULT - ASSESSMENT
57y Male with Hx rectal/prostate cancer s/p radiation and colostomy, lower GIB, now s/p APR with end colostomy creation, VRAM flap closure on 8/24 c/b L thigh collection that was drained and re-accumulated. Pt sent to ED for collection drainage with interventional radiology.    - flap healthy-appearing, daily baci  - F/u CT L thigh with IV contrast read  - IR drainage today  - Hold lovenox per IR  - f.u colorectal plans  - bladder scan this AM  - Home medications  - F/u CBC, CMP, coags  - NPO    PRS  [id number]

## 2022-09-16 NOTE — CONSULT NOTE ADULT - ASSESSMENT
57 year old Male with PMHx of HIV on Biktarvy , DMII, rectal/prostate cancer s/p radiation and colostomy, lower GIB, now s/p APR with end colostomy creation, VRAM flap closure on 8/24 presents for drainage of collection. Patient initially developed L thigh collection post-operatively, which was drained during admission by interventional radiology on 8/30 with the drain left in.  Patient was initially discharged on 9/2 and seen outpatient, where a the drain was removed. Patient developed recollection and could not tolerate outpatient drainage, and was advised to come to the emergency room and for admission for collection drainage by interventional radiology.    Afebrile  Leukocytosis on 9/15 to 13.16K - trended normal     Abscess Cx from previous admission on 8/30: Corynebacterium species, Bacteroids species, Clostridium Cadaveris   Blood Cx from previous admission: NG    CT L LE with IV contrast: s/p abdominal perineal resection with flap reconstruction, pelvic/presacral abscess is again seen extending into the medial left thigh with involvement of the left gracilis and adductor stacy muscles, stable in size, increased cortical erosive change of the left ischial tuberosity adjacent to the abscess collection consistent with osteomyelitis.    Plastic surgery consulted recommended abscess drainage   IR consulted with plan for abscess drainage today   Started on Zosyn on 9/15-->    #pelvic/presacral abscess   #Ischial bone erosion adjacent to abscess - represent OM   #HIV - Last VL <30, CD4 count 400 April 2022    Recommendations:         PT TO BE SEEN. PRELIM NOTE  PENDING RECS. PLEASE WAIT FOR FINAL RECS AFTER DISCUSSION WITH ATTENDING#         57 year old Male with PMHx of HIV on Biktarvy , DMII, rectal/prostate cancer s/p radiation and colostomy, lower GIB, now s/p APR with end colostomy creation, VRAM flap closure on 8/24 presents for drainage of collection. Patient initially developed L thigh collection post-operatively, which was drained during admission by interventional radiology on 8/30 with the drain left in.  Patient was initially discharged on 9/2 and seen outpatient, where a the drain was removed. Patient developed recollection and could not tolerate outpatient drainage, and was advised to come to the emergency room and for admission for collection drainage by interventional radiology.    Afebrile  Leukocytosis on 9/15 to 13.16K - trended normal     Abscess Cx from previous admission on 8/30: Corynebacterium species, Bacteroids species, Clostridium Cadaveris   Blood Cx from previous admission: NG    CT L LE with IV contrast: s/p abdominal perineal resection with flap reconstruction, pelvic/presacral abscess is again seen extending into the medial left thigh with involvement of the left gracilis and adductor stacy muscles, stable in size, increased cortical erosive change of the left ischial tuberosity adjacent to the abscess collection consistent with osteomyelitis.    Plastic surgery consulted recommended abscess drainage   IR consulted with plan for abscess drainage today   Started on Zosyn on 9/15-->    #pelvic/presacral abscess   #Ischial bone erosion adjacent to abscess - represent OM   #HIV - Last VL <30, CD4 count 400 April 2022    Recommendations:   Continue Zosyn 3.375 g IV q 8hrs   IR for abscess drainage today   Send abscess Cx   Follow Blood Cx X2  Continue Biktarvy  Monitor T curve and WBC trend     Seen and discussed with Dr Solomon Mcduffie MD, PGY4  ID fellow  [employer] Teams Preferred  After 5pm/weekends call [phone]

## 2022-09-16 NOTE — CONSULT NOTE ADULT - SUBJECTIVE AND OBJECTIVE BOX
HPI:    57 year old Male with PMHx of HIV on Biktarvy , DMII, rectal/prostate cancer s/p radiation and colostomy, lower GIB, now s/p APR with end colostomy creation, VRAM flap closure on 8/24 presents for drainage of collection. Patient initially developed L thigh collection post-operatively, which was drained during admission by interventional radiology on 8/30 with the drain left in.  Patient was initially discharged on 9/2 and seen outpatient, where a the drain was removed. Patient developed recollection and could not tolerate outpatient drainage, and was advised to come to the emergency room and for admission for collection drainage by interventional radiology. Patient complains of thigh tenderness and also notes urinary incontinence since discharge, but denies other symptoms including fever, nausea, PO tolerance.     REVIEW OF SYSTEMS  [  ] ROS unobtainable because:    [  ] All other systems negative except as noted below    Constitutional:  [ ] fever [ ] chills  [ ] weight loss  [ ]night sweat  [ ]poor appetite/PO intake [ ]fatigue   Skin:  [ ] rash [ ] phlebitis	  Eyes: [ ] icterus [ ] pain  [ ] discharge	  ENMT: [ ] sore throat  [ ] thrush [ ] ulcers [ ] exudates [ ]anosmia  Respiratory: [ ] dyspnea [ ] hemoptysis [ ] cough [ ] sputum	  Cardiovascular:  [ ] chest pain [ ] palpitations [ ] edema	  Gastrointestinal:  [ ] nausea [ ] vomiting [ ] diarrhea [ ] constipation [ ] pain	  Genitourinary:  [ ] dysuria [ ] frequency [ ] hematuria [ ] discharge [ ] flank pain  [ ] incontinence  Musculoskeletal:  [ ] myalgias [ ] arthralgias [ ] arthritis  [ ] back pain  Neurological:  [ ] headache [ ] weakness [ ] seizures  [ ] confusion/altered mental status    prior hospital charts reviewed [V]  primary team notes reviewed [V]  other consultant notes reviewed [V]    PAST MEDICAL & SURGICAL HISTORY:  HTN (hypertension)    HLD (hyperlipidemia)    HIV infection    Depressive disorder    Anxiety    Prostate cancer    Anal cancer    Diverticulosis    Anal lesion    SOCIAL HISTORY:  - Denied smoking/vaping/alcohol/recreational drug use    FAMILY HISTORY:    Allergies  Allergy Status Unknown    ANTIMICROBIALS:  bictegravir 50 mG/emtricitabine 200 mG/tenofovir alafenamide 25 mG (BIKTARVY) 1 daily  piperacillin/tazobactam IVPB.. 3.375 every 8 hours    ANTIMICROBIALS (past 90 days):  MEDICATIONS  (STANDING):    piperacillin/tazobactam IVPB.   200 mL/Hr IV Intermittent (09-15-22 @ 17:05)    piperacillin/tazobactam IVPB.-   25 mL/Hr IV Intermittent (09-15-22 @ 21:51)    piperacillin/tazobactam IVPB..   25 mL/Hr IV Intermittent (09-16-22 @ 06:00)    OTHER MEDS:   MEDICATIONS  (STANDING):  acetaminophen     Tablet .. 975 every 6 hours  ALPRAZolam 0.5 three times a day PRN  amLODIPine   Tablet 5 daily  aspirin  chewable 81 daily  atorvastatin 10 at bedtime  ibuprofen  Tablet. 400 every 6 hours  influenza   Vaccine 0.5 once  melatonin 3 at bedtime PRN  metoprolol succinate ER 50 daily  OLANZapine 10 at bedtime  oxyCODONE    IR 5 every 4 hours PRN  oxyCODONE    IR 10 every 4 hours PRN  polyethylene glycol 3350 17 two times a day PRN  senna 2 at bedtime PRN  tamsulosin 0.4 at bedtime  zolpidem 5 at bedtime PRN    VITALS:  Vital Signs Last 24 Hrs  T(F): 98.7 (09-16-22 @ 09:43), Max: 98.7 (09-16-22 @ 09:43)    Vital Signs Last 24 Hrs  HR: 108 (09-16-22 @ 09:43) (102 - 135)  BP: 107/66 (09-16-22 @ 09:43) (97/82 - 115/68)  RR: 18 (09-16-22 @ 09:43)  SpO2: 99% (09-16-22 @ 09:43) (98% - 100%)  Wt(kg): --    EXAM:  Physical Exam:  Constitutional:  well preserved, comfortable  Head/Eyes: no icterus, PERRL, EOMI  ENT:  supple; no thrush  LUNGS:  CTA  CVS:  normal S1, S2, no murmur  Abd:  soft, non-tender; non-distended  Ext:  no edema  Vascular:  IV site no erythema tenderness or discharge  MSK:  joints without swelling  Neuro: AAO X 3, non- focal    Labs:                        8.2    9.01  )-----------( 452      ( 16 Sep 2022 06:08 )             27.1     09-16    135  |  99  |  16  ----------------------------<  99  3.8   |  27  |  0.74    Ca    9.2      16 Sep 2022 06:08  Mg     2.20     09-15    TPro  7.2  /  Alb  3.1<L>  /  TBili  0.2  /  DBili  x   /  AST  14  /  ALT  19  /  AlkPhos  112  09-16    WBC Trend:  WBC Count: 9.01 (09-16-22 @ 06:08)  WBC Count: 13.16 (09-15-22 @ 16:30)    Auto Neutrophil #: 24.23 K/uL (08-16-22 @ 14:00)  Auto Neutrophil #: 4.61 K/uL (04-21-22 @ 15:21)  Auto Neutrophil #: 5.56 K/uL (04-02-22 @ 06:29)  Auto Neutrophil #: 7.00 K/uL (04-01-22 @ 07:45)  Auto Neutrophil #: 9.25 K/uL (03-31-22 @ 08:30)    Creatine Trend:  Creatinine, Serum: 0.74 (09-16)  Creatinine, Serum: 0.77 (09-15)    Liver Biochemical Testing Trend:  Alanine Aminotransferase (ALT/SGPT): 19 (09-16)  Alanine Aminotransferase (ALT/SGPT): 20 (09-15)  Alanine Aminotransferase (ALT/SGPT): 12 (08-29)  Alanine Aminotransferase (ALT/SGPT): 5 (08-28)  Alanine Aminotransferase (ALT/SGPT): 11 (08-16)  Aspartate Aminotransferase (AST/SGOT): 14 (09-16-22 @ 06:08)  Aspartate Aminotransferase (AST/SGOT): 13 (09-15-22 @ 16:30)  Aspartate Aminotransferase (AST/SGOT): 17 (08-29-22 @ 05:30)  Aspartate Aminotransferase (AST/SGOT): 9 (08-28-22 @ 07:03)  Aspartate Aminotransferase (AST/SGOT): 13 (08-16-22 @ 14:00)  Bilirubin Total, Serum: 0.2 (09-16)  Bilirubin Total, Serum: 0.3 (09-15)  Bilirubin Total, Serum: 0.2 (08-29)  Bilirubin Total, Serum: 0.2 (08-28)  Bilirubin Total, Serum: 1.1 (08-16)    Trend LDH    MICROBIOLOGY:    MRSA PCR Result.: NotDetec (03-18-22 @ 12:19)    Culture - Fungal, Body Fluid (collected 31 Aug 2022 12:15)  Source: .Body Fluid LEFT TIGH DRAINAGE  Preliminary Report:    No growth    Culture - Body Fluid with Gram Stain (collected 31 Aug 2022 12:15)  Source: .Body Fluid LEFT TIGH DRAINAGE  Final Report:    Few Corynebacterium species "Susceptibilities not performed"    Few Bacteroides vulgatus group "Susceptibilities not performed"    Rare Clostridium cadaveris "Susceptibilities not performed"    Organism seen in Gram stain is non-viable after prolonged    incubation and repeated subculture.    Culture - Blood (collected 29 Aug 2022 17:30)  Source: .Blood Blood  Final Report:    No Growth Final    Culture - Blood (collected 29 Aug 2022 16:15)  Source: .Blood Blood  Final Report:    No Growth Final    Culture - Blood (collected 27 Aug 2022 06:28)  Source: .Blood Blood-Peripheral  Final Report:    No Growth Final    Culture - Blood (collected 27 Aug 2022 06:28)  Source: .Blood Blood-Venous  Final Report:    No Growth Final    Culture - Blood (collected 16 Aug 2022 14:15)  Source: .Blood Blood-Peripheral  Final Report:    No Growth Final    Culture - Blood (collected 16 Aug 2022 14:00)  Source: .Blood Blood-Peripheral  Final Report:    No Growth Final    Culture - Blood (collected 26 Mar 2022 02:20)  Source: .Blood Blood-Venous  Final Report:    No Growth Final    Culture - Blood (collected 26 Mar 2022 02:20)  Source: .Blood Blood-Peripheral  Final Report:    No Growth Final    ABS CD4: 400 /uL (04-21-22 @ 18:04)  ABS CD4: 239 /uL (03-18-22 @ 13:49)    HIV-1 RNA Quantitative, Viral Load: DET. <30 copies/mL (04-21-22 @ 15:23)  HIV-1 Viral Load Result: DET. (04-21-22 @ 15:23)  HIV-1 RNA Quantitative, Viral Load: NOT DET. copies/mL (03-18-22 @ 12:31)  HIV-1 Viral Load Result: NOT DET. (03-18-22 @ 12:31)    COVID-19 PCR: NotDetec (09-15-22 @ 16:44)  COVID-19 PCR: NotDetec (08-31-22 @ 08:08)    A1C with Estimated Average Glucose Result: 5.3 % (08-18-22 @ 06:05)  A1C with Estimated Average Glucose Result: 6.0 % (04-21-22 @ 15:21)    RADIOLOGY:  imaging below personally reviewed      < from: CT Lower Extremity w/ IV Cont, Left (09.15.22 @ 19:47) >    IMPRESSION:  1. Patient status post abdominal perineal resection with vertical rectus   abdominis musculocutaneous flap reconstruction.  2. Associated pelvic/presacral abscess is again seen extending into the   medial left thigh with involvement of the left gracilis and adductor   stacy muscles. Thisis relatively stable in size.  3. There is increased cortical erosive change of the left ischial   tuberosity adjacent to the abscess collection consistent with   osteomyelitis.    < end of copied text >           HPI:    57 year old Male with PMHx of HIV on Biktarvy , DMII, rectal/prostate cancer s/p radiation and colostomy, lower GIB, now s/p APR with end colostomy creation, VRAM flap closure on 8/24 presents for drainage of collection. Patient initially developed L thigh collection post-operatively, which was drained during admission by interventional radiology on 8/30 with the drain left in.  Patient was initially discharged on 9/2 and seen outpatient, where a the drain was removed. Patient developed recollection and could not tolerate outpatient drainage, and was advised to come to the emergency room and for admission for collection drainage by interventional radiology. Patient complains of thigh tenderness and also notes urinary incontinence since discharge, but denies other symptoms including fever, nausea, PO tolerance.     REVIEW OF SYSTEMS  [  ] ROS unobtainable because:    [X] All other systems negative except as noted below    Constitutional:  [ ] fever [ ] chills  [ ] weight loss  [ ]night sweat  [ ]poor appetite/PO intake [ ]fatigue   Skin:  [ ] rash [ ] phlebitis [x] left groin abscess	  Eyes: [ ] icterus [ ] pain  [ ] discharge	  ENMT: [ ] sore throat  [ ] thrush [ ] ulcers [ ] exudates [ ]anosmia  Respiratory: [ ] dyspnea [ ] hemoptysis [ ] cough [ ] sputum	  Cardiovascular:  [ ] chest pain [ ] palpitations [ ] edema	  Gastrointestinal:  [ ] nausea [ ] vomiting [ ] diarrhea [ ] constipation [ ] pain	  Genitourinary:  [ ] dysuria [ ] frequency [ ] hematuria [ ] discharge [ ] flank pain  [ ] incontinence  Musculoskeletal:  [ ] myalgias [ ] arthralgias [ ] arthritis  [ ] back pain  Neurological:  [ ] headache [ ] weakness [ ] seizures  [ ] confusion/altered mental status    prior hospital charts reviewed [V]  primary team notes reviewed [V]  other consultant notes reviewed [V]    PAST MEDICAL & SURGICAL HISTORY:  HTN (hypertension)    HLD (hyperlipidemia)    HIV infection    Depressive disorder    Anxiety    Prostate cancer    Anal cancer    Diverticulosis    Anal lesion    SOCIAL HISTORY:  - Denied smoking/vaping/alcohol/recreational drug use    FAMILY HISTORY:    Allergies  Allergy Status Unknown    ANTIMICROBIALS:  bictegravir 50 mG/emtricitabine 200 mG/tenofovir alafenamide 25 mG (BIKTARVY) 1 daily  piperacillin/tazobactam IVPB.. 3.375 every 8 hours    ANTIMICROBIALS (past 90 days):  MEDICATIONS  (STANDING):    piperacillin/tazobactam IVPB.   200 mL/Hr IV Intermittent (09-15-22 @ 17:05)    piperacillin/tazobactam IVPB.-   25 mL/Hr IV Intermittent (09-15-22 @ 21:51)    piperacillin/tazobactam IVPB..   25 mL/Hr IV Intermittent (09-16-22 @ 06:00)    OTHER MEDS:   MEDICATIONS  (STANDING):  acetaminophen     Tablet .. 975 every 6 hours  ALPRAZolam 0.5 three times a day PRN  amLODIPine   Tablet 5 daily  aspirin  chewable 81 daily  atorvastatin 10 at bedtime  ibuprofen  Tablet. 400 every 6 hours  influenza   Vaccine 0.5 once  melatonin 3 at bedtime PRN  metoprolol succinate ER 50 daily  OLANZapine 10 at bedtime  oxyCODONE    IR 5 every 4 hours PRN  oxyCODONE    IR 10 every 4 hours PRN  polyethylene glycol 3350 17 two times a day PRN  senna 2 at bedtime PRN  tamsulosin 0.4 at bedtime  zolpidem 5 at bedtime PRN    VITALS:  Vital Signs Last 24 Hrs  T(F): 98.7 (09-16-22 @ 09:43), Max: 98.7 (09-16-22 @ 09:43)    Vital Signs Last 24 Hrs  HR: 108 (09-16-22 @ 09:43) (102 - 135)  BP: 107/66 (09-16-22 @ 09:43) (97/82 - 115/68)  RR: 18 (09-16-22 @ 09:43)  SpO2: 99% (09-16-22 @ 09:43) (98% - 100%)  Wt(kg): --    EXAM:  Physical Exam:  Constitutional:  well preserved, comfortable  Head/Eyes: no icterus, PERRL, EOMI  ENT:  supple; no thrush  LUNGS:  CTA  CVS:  normal S1, S2, no murmur  Abd: left groin mass and purulent drainage   Ext:  no edema  Vascular:  IV site no erythema tenderness or discharge  MSK:  joints without swelling  Neuro: AAO X 3, non- focal    Labs:                        8.2    9.01  )-----------( 452      ( 16 Sep 2022 06:08 )             27.1     09-16    135  |  99  |  16  ----------------------------<  99  3.8   |  27  |  0.74    Ca    9.2      16 Sep 2022 06:08  Mg     2.20     09-15    TPro  7.2  /  Alb  3.1<L>  /  TBili  0.2  /  DBili  x   /  AST  14  /  ALT  19  /  AlkPhos  112  09-16    WBC Trend:  WBC Count: 9.01 (09-16-22 @ 06:08)  WBC Count: 13.16 (09-15-22 @ 16:30)    Auto Neutrophil #: 24.23 K/uL (08-16-22 @ 14:00)  Auto Neutrophil #: 4.61 K/uL (04-21-22 @ 15:21)  Auto Neutrophil #: 5.56 K/uL (04-02-22 @ 06:29)  Auto Neutrophil #: 7.00 K/uL (04-01-22 @ 07:45)  Auto Neutrophil #: 9.25 K/uL (03-31-22 @ 08:30)    Creatine Trend:  Creatinine, Serum: 0.74 (09-16)  Creatinine, Serum: 0.77 (09-15)    Liver Biochemical Testing Trend:  Alanine Aminotransferase (ALT/SGPT): 19 (09-16)  Alanine Aminotransferase (ALT/SGPT): 20 (09-15)  Alanine Aminotransferase (ALT/SGPT): 12 (08-29)  Alanine Aminotransferase (ALT/SGPT): 5 (08-28)  Alanine Aminotransferase (ALT/SGPT): 11 (08-16)  Aspartate Aminotransferase (AST/SGOT): 14 (09-16-22 @ 06:08)  Aspartate Aminotransferase (AST/SGOT): 13 (09-15-22 @ 16:30)  Aspartate Aminotransferase (AST/SGOT): 17 (08-29-22 @ 05:30)  Aspartate Aminotransferase (AST/SGOT): 9 (08-28-22 @ 07:03)  Aspartate Aminotransferase (AST/SGOT): 13 (08-16-22 @ 14:00)  Bilirubin Total, Serum: 0.2 (09-16)  Bilirubin Total, Serum: 0.3 (09-15)  Bilirubin Total, Serum: 0.2 (08-29)  Bilirubin Total, Serum: 0.2 (08-28)  Bilirubin Total, Serum: 1.1 (08-16)    Trend LDH    MICROBIOLOGY:    MRSA PCR Result.: NotDetec (03-18-22 @ 12:19)    Culture - Fungal, Body Fluid (collected 31 Aug 2022 12:15)  Source: .Body Fluid LEFT TIGH DRAINAGE  Preliminary Report:    No growth    Culture - Body Fluid with Gram Stain (collected 31 Aug 2022 12:15)  Source: .Body Fluid LEFT TIGH DRAINAGE  Final Report:    Few Corynebacterium species "Susceptibilities not performed"    Few Bacteroides vulgatus group "Susceptibilities not performed"    Rare Clostridium cadaveris "Susceptibilities not performed"    Organism seen in Gram stain is non-viable after prolonged    incubation and repeated subculture.    Culture - Blood (collected 29 Aug 2022 17:30)  Source: .Blood Blood  Final Report:    No Growth Final    Culture - Blood (collected 29 Aug 2022 16:15)  Source: .Blood Blood  Final Report:    No Growth Final    Culture - Blood (collected 27 Aug 2022 06:28)  Source: .Blood Blood-Peripheral  Final Report:    No Growth Final    Culture - Blood (collected 27 Aug 2022 06:28)  Source: .Blood Blood-Venous  Final Report:    No Growth Final    Culture - Blood (collected 16 Aug 2022 14:15)  Source: .Blood Blood-Peripheral  Final Report:    No Growth Final    Culture - Blood (collected 16 Aug 2022 14:00)  Source: .Blood Blood-Peripheral  Final Report:    No Growth Final    Culture - Blood (collected 26 Mar 2022 02:20)  Source: .Blood Blood-Venous  Final Report:    No Growth Final    Culture - Blood (collected 26 Mar 2022 02:20)  Source: .Blood Blood-Peripheral  Final Report:    No Growth Final    ABS CD4: 400 /uL (04-21-22 @ 18:04)  ABS CD4: 239 /uL (03-18-22 @ 13:49)    HIV-1 RNA Quantitative, Viral Load: DET. <30 copies/mL (04-21-22 @ 15:23)  HIV-1 Viral Load Result: DET. (04-21-22 @ 15:23)  HIV-1 RNA Quantitative, Viral Load: NOT DET. copies/mL (03-18-22 @ 12:31)  HIV-1 Viral Load Result: NOT DET. (03-18-22 @ 12:31)    COVID-19 PCR: NotDetec (09-15-22 @ 16:44)  COVID-19 PCR: NotDetec (08-31-22 @ 08:08)    A1C with Estimated Average Glucose Result: 5.3 % (08-18-22 @ 06:05)  A1C with Estimated Average Glucose Result: 6.0 % (04-21-22 @ 15:21)    RADIOLOGY:  imaging below personally reviewed      < from: CT Lower Extremity w/ IV Cont, Left (09.15.22 @ 19:47) >    IMPRESSION:  1. Patient status post abdominal perineal resection with vertical rectus   abdominis musculocutaneous flap reconstruction.  2. Associated pelvic/presacral abscess is again seen extending into the   medial left thigh with involvement of the left gracilis and adductor   stacy muscles. Thisis relatively stable in size.  3. There is increased cortical erosive change of the left ischial   tuberosity adjacent to the abscess collection consistent with   osteomyelitis.    < end of copied text >

## 2022-09-17 RX ORDER — ENOXAPARIN SODIUM 100 MG/ML
40 INJECTION SUBCUTANEOUS EVERY 24 HOURS
Refills: 0 | Status: DISCONTINUED | OUTPATIENT
Start: 2022-09-17 | End: 2022-09-19

## 2022-09-17 RX ADMIN — Medication 400 MILLIGRAM(S): at 03:00

## 2022-09-17 RX ADMIN — OXYCODONE HYDROCHLORIDE 10 MILLIGRAM(S): 5 TABLET ORAL at 21:00

## 2022-09-17 RX ADMIN — Medication 975 MILLIGRAM(S): at 20:06

## 2022-09-17 RX ADMIN — ENOXAPARIN SODIUM 40 MILLIGRAM(S): 100 INJECTION SUBCUTANEOUS at 07:46

## 2022-09-17 RX ADMIN — Medication 400 MILLIGRAM(S): at 02:32

## 2022-09-17 RX ADMIN — OLANZAPINE 10 MILLIGRAM(S): 15 TABLET, FILM COATED ORAL at 21:32

## 2022-09-17 RX ADMIN — OXYCODONE HYDROCHLORIDE 10 MILLIGRAM(S): 5 TABLET ORAL at 07:46

## 2022-09-17 RX ADMIN — PIPERACILLIN AND TAZOBACTAM 25 GRAM(S): 4; .5 INJECTION, POWDER, LYOPHILIZED, FOR SOLUTION INTRAVENOUS at 05:45

## 2022-09-17 RX ADMIN — Medication 975 MILLIGRAM(S): at 07:45

## 2022-09-17 RX ADMIN — AMLODIPINE BESYLATE 5 MILLIGRAM(S): 2.5 TABLET ORAL at 05:46

## 2022-09-17 RX ADMIN — Medication 81 MILLIGRAM(S): at 12:43

## 2022-09-17 RX ADMIN — OXYCODONE HYDROCHLORIDE 10 MILLIGRAM(S): 5 TABLET ORAL at 13:30

## 2022-09-17 RX ADMIN — ZOLPIDEM TARTRATE 5 MILLIGRAM(S): 10 TABLET ORAL at 21:30

## 2022-09-17 RX ADMIN — OXYCODONE HYDROCHLORIDE 10 MILLIGRAM(S): 5 TABLET ORAL at 20:05

## 2022-09-17 RX ADMIN — BICTEGRAVIR SODIUM, EMTRICITABINE, AND TENOFOVIR ALAFENAMIDE FUMARATE 1 TABLET(S): 30; 120; 15 TABLET ORAL at 12:43

## 2022-09-17 RX ADMIN — Medication 975 MILLIGRAM(S): at 09:00

## 2022-09-17 RX ADMIN — PIPERACILLIN AND TAZOBACTAM 25 GRAM(S): 4; .5 INJECTION, POWDER, LYOPHILIZED, FOR SOLUTION INTRAVENOUS at 21:31

## 2022-09-17 RX ADMIN — ATORVASTATIN CALCIUM 10 MILLIGRAM(S): 80 TABLET, FILM COATED ORAL at 21:31

## 2022-09-17 RX ADMIN — OXYCODONE HYDROCHLORIDE 10 MILLIGRAM(S): 5 TABLET ORAL at 12:42

## 2022-09-17 RX ADMIN — TAMSULOSIN HYDROCHLORIDE 0.4 MILLIGRAM(S): 0.4 CAPSULE ORAL at 21:31

## 2022-09-17 RX ADMIN — PIPERACILLIN AND TAZOBACTAM 25 GRAM(S): 4; .5 INJECTION, POWDER, LYOPHILIZED, FOR SOLUTION INTRAVENOUS at 14:05

## 2022-09-17 RX ADMIN — Medication 975 MILLIGRAM(S): at 02:32

## 2022-09-17 RX ADMIN — OXYCODONE HYDROCHLORIDE 10 MILLIGRAM(S): 5 TABLET ORAL at 09:00

## 2022-09-17 RX ADMIN — Medication 50 MILLIGRAM(S): at 05:47

## 2022-09-17 RX ADMIN — Medication 975 MILLIGRAM(S): at 03:00

## 2022-09-17 RX ADMIN — Medication 975 MILLIGRAM(S): at 21:00

## 2022-09-17 NOTE — PROGRESS NOTE ADULT - ASSESSMENT
57y Male with Hx rectal/prostate cancer s/p radiation and colostomy, lower GIB, now s/p APR with end colostomy creation, VRAM flap closure on 8/24 c/b L thigh collection that was drained and re-accumulated. Pt sent to ED for collection drainage with interventional radiology.    - flap healthy-appearing, daily baci  - F/u CT L thigh with IV contrast read  - IR drainage today  - SQH  - f.u colorectal plans  - bladder scan residual: <30, started on flomax. F/u uro outpatient  - Home medications  - F/u CBC, CMP, coags  - consistent carb diet    PRS  [id number]

## 2022-09-17 NOTE — PROVIDER CONTACT NOTE (OTHER) - SITUATION
MD Ventura informed that patient's hr is 115. No c/o dizziness . Patient however c/o pain at surgical site

## 2022-09-17 NOTE — PROVIDER CONTACT NOTE (OTHER) - ACTION/TREATMENT ORDERED:
No intervention at this time for the elevated HR. Patient medicated for pain. Will continue to monitor.

## 2022-09-18 LAB
HCT VFR BLD CALC: 31.2 % — LOW (ref 39–50)
HGB BLD-MCNC: 9.6 G/DL — LOW (ref 13–17)
MCHC RBC-ENTMCNC: 27 PG — SIGNIFICANT CHANGE UP (ref 27–34)
MCHC RBC-ENTMCNC: 30.8 GM/DL — LOW (ref 32–36)
MCV RBC AUTO: 87.9 FL — SIGNIFICANT CHANGE UP (ref 80–100)
NRBC # BLD: 0 /100 WBCS — SIGNIFICANT CHANGE UP (ref 0–0)
NRBC # FLD: 0 K/UL — SIGNIFICANT CHANGE UP (ref 0–0)
PLATELET # BLD AUTO: 510 K/UL — HIGH (ref 150–400)
RBC # BLD: 3.55 M/UL — LOW (ref 4.2–5.8)
RBC # FLD: 15.8 % — HIGH (ref 10.3–14.5)
WBC # BLD: 4.58 K/UL — SIGNIFICANT CHANGE UP (ref 3.8–10.5)
WBC # FLD AUTO: 4.58 K/UL — SIGNIFICANT CHANGE UP (ref 3.8–10.5)

## 2022-09-18 RX ORDER — ZOLPIDEM TARTRATE 10 MG/1
5 TABLET ORAL ONCE
Refills: 0 | Status: DISCONTINUED | OUTPATIENT
Start: 2022-09-18 | End: 2022-09-18

## 2022-09-18 RX ADMIN — Medication 975 MILLIGRAM(S): at 14:26

## 2022-09-18 RX ADMIN — ZOLPIDEM TARTRATE 5 MILLIGRAM(S): 10 TABLET ORAL at 02:26

## 2022-09-18 RX ADMIN — Medication 400 MILLIGRAM(S): at 02:27

## 2022-09-18 RX ADMIN — OLANZAPINE 10 MILLIGRAM(S): 15 TABLET, FILM COATED ORAL at 21:36

## 2022-09-18 RX ADMIN — Medication 400 MILLIGRAM(S): at 09:15

## 2022-09-18 RX ADMIN — TAMSULOSIN HYDROCHLORIDE 0.4 MILLIGRAM(S): 0.4 CAPSULE ORAL at 21:35

## 2022-09-18 RX ADMIN — OXYCODONE HYDROCHLORIDE 10 MILLIGRAM(S): 5 TABLET ORAL at 11:52

## 2022-09-18 RX ADMIN — ENOXAPARIN SODIUM 40 MILLIGRAM(S): 100 INJECTION SUBCUTANEOUS at 06:53

## 2022-09-18 RX ADMIN — OXYCODONE HYDROCHLORIDE 10 MILLIGRAM(S): 5 TABLET ORAL at 05:42

## 2022-09-18 RX ADMIN — OXYCODONE HYDROCHLORIDE 10 MILLIGRAM(S): 5 TABLET ORAL at 12:30

## 2022-09-18 RX ADMIN — Medication 975 MILLIGRAM(S): at 08:24

## 2022-09-18 RX ADMIN — Medication 975 MILLIGRAM(S): at 02:27

## 2022-09-18 RX ADMIN — Medication 975 MILLIGRAM(S): at 15:00

## 2022-09-18 RX ADMIN — PIPERACILLIN AND TAZOBACTAM 25 GRAM(S): 4; .5 INJECTION, POWDER, LYOPHILIZED, FOR SOLUTION INTRAVENOUS at 21:34

## 2022-09-18 RX ADMIN — OXYCODONE HYDROCHLORIDE 10 MILLIGRAM(S): 5 TABLET ORAL at 18:36

## 2022-09-18 RX ADMIN — OXYCODONE HYDROCHLORIDE 10 MILLIGRAM(S): 5 TABLET ORAL at 19:16

## 2022-09-18 RX ADMIN — Medication 81 MILLIGRAM(S): at 11:36

## 2022-09-18 RX ADMIN — Medication 400 MILLIGRAM(S): at 14:26

## 2022-09-18 RX ADMIN — PIPERACILLIN AND TAZOBACTAM 25 GRAM(S): 4; .5 INJECTION, POWDER, LYOPHILIZED, FOR SOLUTION INTRAVENOUS at 14:26

## 2022-09-18 RX ADMIN — BICTEGRAVIR SODIUM, EMTRICITABINE, AND TENOFOVIR ALAFENAMIDE FUMARATE 1 TABLET(S): 30; 120; 15 TABLET ORAL at 11:36

## 2022-09-18 RX ADMIN — AMLODIPINE BESYLATE 5 MILLIGRAM(S): 2.5 TABLET ORAL at 05:42

## 2022-09-18 RX ADMIN — Medication 50 MILLIGRAM(S): at 05:42

## 2022-09-18 RX ADMIN — Medication 975 MILLIGRAM(S): at 03:15

## 2022-09-18 RX ADMIN — PIPERACILLIN AND TAZOBACTAM 25 GRAM(S): 4; .5 INJECTION, POWDER, LYOPHILIZED, FOR SOLUTION INTRAVENOUS at 05:42

## 2022-09-18 RX ADMIN — Medication 400 MILLIGRAM(S): at 15:00

## 2022-09-18 RX ADMIN — Medication 400 MILLIGRAM(S): at 03:15

## 2022-09-18 RX ADMIN — Medication 400 MILLIGRAM(S): at 08:23

## 2022-09-18 RX ADMIN — ZOLPIDEM TARTRATE 5 MILLIGRAM(S): 10 TABLET ORAL at 21:34

## 2022-09-18 RX ADMIN — ATORVASTATIN CALCIUM 10 MILLIGRAM(S): 80 TABLET, FILM COATED ORAL at 21:36

## 2022-09-18 RX ADMIN — Medication 975 MILLIGRAM(S): at 09:15

## 2022-09-18 RX ADMIN — OXYCODONE HYDROCHLORIDE 10 MILLIGRAM(S): 5 TABLET ORAL at 06:35

## 2022-09-18 NOTE — PROGRESS NOTE ADULT - ASSESSMENT
57y Male with Hx rectal/prostate cancer s/p radiation and colostomy, lower GIB, now s/p APR with end colostomy creation, VRAM flap closure on 8/24 c/b L thigh collection that was drained and re-accumulated. Pt sent to ED for collection drainage with interventional radiology.    - flap healthy-appearing, daily baci  - F/u CT L thigh with IV contrast read  - IR drainage today  - SQH  - f.u colorectal plans  - bladder scan residual: <30, started on flomax. F/u uro outpatient  - Home medications  - F/u CBC, CMP, coags  - consistent carb diet    PRS  [id number]

## 2022-09-19 ENCOUNTER — TRANSCRIPTION ENCOUNTER (OUTPATIENT)
Age: 57
End: 2022-09-19

## 2022-09-19 VITALS — HEART RATE: 116 BPM

## 2022-09-19 PROCEDURE — 99232 SBSQ HOSP IP/OBS MODERATE 35: CPT

## 2022-09-19 PROCEDURE — 93010 ELECTROCARDIOGRAM REPORT: CPT

## 2022-09-19 RX ORDER — TAMSULOSIN HYDROCHLORIDE 0.4 MG/1
1 CAPSULE ORAL
Qty: 30 | Refills: 0
Start: 2022-09-19 | End: 2022-10-18

## 2022-09-19 RX ORDER — METFORMIN HYDROCHLORIDE 850 MG/1
0 TABLET ORAL
Qty: 0 | Refills: 0 | DISCHARGE

## 2022-09-19 RX ORDER — METRONIDAZOLE 500 MG
1 TABLET ORAL
Qty: 56 | Refills: 0
Start: 2022-09-19 | End: 2022-10-16

## 2022-09-19 RX ORDER — TAMSULOSIN HYDROCHLORIDE 0.4 MG/1
1 CAPSULE ORAL
Qty: 0 | Refills: 0 | DISCHARGE
Start: 2022-09-19

## 2022-09-19 RX ADMIN — OXYCODONE HYDROCHLORIDE 10 MILLIGRAM(S): 5 TABLET ORAL at 10:07

## 2022-09-19 RX ADMIN — AMLODIPINE BESYLATE 5 MILLIGRAM(S): 2.5 TABLET ORAL at 06:15

## 2022-09-19 RX ADMIN — Medication 975 MILLIGRAM(S): at 07:53

## 2022-09-19 RX ADMIN — OXYCODONE HYDROCHLORIDE 10 MILLIGRAM(S): 5 TABLET ORAL at 06:57

## 2022-09-19 RX ADMIN — Medication 975 MILLIGRAM(S): at 08:23

## 2022-09-19 RX ADMIN — PIPERACILLIN AND TAZOBACTAM 25 GRAM(S): 4; .5 INJECTION, POWDER, LYOPHILIZED, FOR SOLUTION INTRAVENOUS at 14:03

## 2022-09-19 RX ADMIN — PIPERACILLIN AND TAZOBACTAM 25 GRAM(S): 4; .5 INJECTION, POWDER, LYOPHILIZED, FOR SOLUTION INTRAVENOUS at 06:12

## 2022-09-19 RX ADMIN — BICTEGRAVIR SODIUM, EMTRICITABINE, AND TENOFOVIR ALAFENAMIDE FUMARATE 1 TABLET(S): 30; 120; 15 TABLET ORAL at 12:40

## 2022-09-19 RX ADMIN — Medication 81 MILLIGRAM(S): at 12:39

## 2022-09-19 RX ADMIN — Medication 50 MILLIGRAM(S): at 06:15

## 2022-09-19 RX ADMIN — Medication 400 MILLIGRAM(S): at 07:53

## 2022-09-19 RX ADMIN — OXYCODONE HYDROCHLORIDE 10 MILLIGRAM(S): 5 TABLET ORAL at 06:14

## 2022-09-19 RX ADMIN — Medication 400 MILLIGRAM(S): at 08:23

## 2022-09-19 RX ADMIN — ENOXAPARIN SODIUM 40 MILLIGRAM(S): 100 INJECTION SUBCUTANEOUS at 06:19

## 2022-09-19 RX ADMIN — OXYCODONE HYDROCHLORIDE 10 MILLIGRAM(S): 5 TABLET ORAL at 10:37

## 2022-09-19 NOTE — PATIENT PROFILE ADULT - FUNCTIONAL ASSESSMENT - BASIC MOBILITY 2.
3 = A little assistance Posterior Auricular Interpolation Flap Text: A decision was made to reconstruct the defect utilizing an interpolation axial flap and a staged reconstruction.  A telfa template was made of the defect.  This telfa template was then used to outline the posterior auricular interpolation flap.  The donor area for the pedicle flap was then injected with anesthesia.  The flap was excised through the skin and subcutaneous tissue down to the layer of the underlying musculature.  The pedicle flap was carefully excised within this deep plane to maintain its blood supply.  The edges of the donor site were undermined.   The donor site was closed in a primary fashion.  The pedicle was then rotated into position and sutured.  Once the tube was sutured into place, adequate blood supply was confirmed with blanching and refill.  The pedicle was then wrapped with xeroform gauze and dressed appropriately with a telfa and gauze bandage to ensure continued blood supply and protect the attached pedicle.

## 2022-09-19 NOTE — DISCHARGE NOTE PROVIDER - NPI NUMBER (FOR SYSADMIN USE ONLY) :
[[id number]],[[id number]] [[id number]],[[id number]] [[id number]],[UNKNOWN],[[id number]]

## 2022-09-19 NOTE — PROGRESS NOTE ADULT - PROVIDER SPECIALTY LIST ADULT
Plastic Surgery
Plastic Surgery
Intervent Radiology
Plastic Surgery
Colorectal Surgery
Plastic Surgery
Infectious Disease

## 2022-09-19 NOTE — DISCHARGE NOTE NURSING/CASE MANAGEMENT/SOCIAL WORK - NSDCPEFALRISK_GEN_ALL_CORE
For information on Fall & Injury Prevention, visit: [email or web address] OR  [email or web address] OR  https://www.cdc.gov/steadi/patient.html

## 2022-09-19 NOTE — PROGRESS NOTE ADULT - ASSESSMENT
57y Male with Hx rectal/prostate cancer s/p radiation and colostomy, lower GIB, now s/p APR with end colostomy creation, VRAM flap closure on 8/24 c/b L thigh collection that was drained and re-accumulated. Pt sent to ED for collection drainage with interventional radiology, s/p draining 9/16.    - no acute intervention from colorectal surgery standpoint, ostomy functioning well  - IR drain in place, monitor output  - Reg diet as tolerated.  - dispo per PRS    Discussed with Dr. Serafin Bhakta PGY3  A team surgery  [id number] 57y Male with Hx rectal/prostate cancer s/p radiation and colostomy, lower GIB, now s/p APR with end colostomy creation, VRAM flap closure on 8/24 c/b L thigh collection that was drained and re-accumulated. Pt sent to ED for collection drainage with interventional radiology, s/p drain placement 9/16.    - no intervention from colorectal surgery standpoint, ostomy functioning well  - IR drain in place, monitor output  - Reg diet as tolerated.  - dispo per PRS    Discussed with Dr. Serafin Bhakta PGY3  A team surgery  [id number]

## 2022-09-19 NOTE — DISCHARGE NOTE PROVIDER - NSDCMRMEDTOKEN_GEN_ALL_CORE_FT
ALPRAZolam 0.5 mg oral tablet: 1 tab(s) orally 3 times a day, As Needed  ALPRAZolam 1 mg oral tablet: 1 tab(s) orally once a day, As needed, anxiety  amLODIPine 5 mg oral tablet: 1 tab(s) orally once a day  aspirin 81 mg oral tablet: orally once a day  atorvastatin 10 mg oral tablet: 1 tab(s) orally once a day  bictegravir/emtricitabine/tenofovir 50 mg-200 mg-25 mg oral tablet: 1 tab(s) orally once a day  Biktarvy 30 mg-120 mg-15 mg oral tablet: 1 tab(s) orally once a day  gabapentin 300 mg oral capsule: 1 cap(s) orally 3 times a day   metFORMIN:   metoprolol succinate 50 mg oral tablet, extended release: 1 tab(s) orally once a day  MS Contin 30 mg oral tablet, extended release: 3 tabs orally every 8 hours   ISTOP [id number] MDD:9 tabs  PRICE CHECK [id number]  OLANZapine 10 mg oral tablet: 1 tab(s) orally once a day (at bedtime)  OLANZapine 5 mg oral tablet: 1 tab(s) orally once a day  polyethylene glycol 3350 oral powder for reconstitution: 17 gram(s) orally 2 times a day  senna oral tablet: 2 tab(s) orally once a day (at bedtime)  tamsulosin 0.4 mg oral capsule: 1 cap(s) orally once a day (at bedtime)  zolpidem 10 mg oral tablet: 1 tab(s) orally once a day (at bedtime)   ALPRAZolam 0.5 mg oral tablet: 1 tab(s) orally 3 times a day, As Needed  ALPRAZolam 1 mg oral tablet: 1 tab(s) orally once a day, As needed, anxiety  amLODIPine 5 mg oral tablet: 1 tab(s) orally once a day  aspirin 81 mg oral tablet: orally once a day  atorvastatin 10 mg oral tablet: 1 tab(s) orally once a day  bictegravir/emtricitabine/tenofovir 50 mg-200 mg-25 mg oral tablet: 1 tab(s) orally once a day  Biktarvy 30 mg-120 mg-15 mg oral tablet: 1 tab(s) orally once a day  gabapentin 300 mg oral capsule: 1 cap(s) orally 3 times a day   levoFLOXacin 500 mg oral tablet: 1 tab(s) orally once a day   metFORMIN:   metoprolol succinate 50 mg oral tablet, extended release: 1 tab(s) orally once a day  metroNIDAZOLE 500 mg oral tablet: 1 tab(s) orally 2 times a day   MS Contin 30 mg oral tablet, extended release: 3 tabs orally every 8 hours   ISTOP [id number] MDD:9 tabs  PRICE CHECK [id number]  OLANZapine 10 mg oral tablet: 1 tab(s) orally once a day (at bedtime)  OLANZapine 5 mg oral tablet: 1 tab(s) orally once a day  polyethylene glycol 3350 oral powder for reconstitution: 17 gram(s) orally 2 times a day  senna oral tablet: 2 tab(s) orally once a day (at bedtime)  tamsulosin 0.4 mg oral capsule: 1 cap(s) orally once a day (at bedtime)  zolpidem 10 mg oral tablet: 1 tab(s) orally once a day (at bedtime)   ALPRAZolam 0.5 mg oral tablet: 1 tab(s) orally 3 times a day, As Needed  ALPRAZolam 1 mg oral tablet: 1 tab(s) orally once a day, As needed, anxiety  amLODIPine 5 mg oral tablet: 1 tab(s) orally once a day  aspirin 81 mg oral tablet: orally once a day  atorvastatin 10 mg oral tablet: 1 tab(s) orally once a day  bictegravir/emtricitabine/tenofovir 50 mg-200 mg-25 mg oral tablet: 1 tab(s) orally once a day  Biktarvy 30 mg-120 mg-15 mg oral tablet: 1 tab(s) orally once a day  levoFLOXacin 500 mg oral tablet: 1 tab(s) orally once a day   metoprolol succinate 50 mg oral tablet, extended release: 1 tab(s) orally once a day  metroNIDAZOLE 500 mg oral tablet: 1 tab(s) orally 2 times a day   MS Contin 30 mg oral tablet, extended release: 3 tabs orally every 8 hours   ISTOP [id number] MDD:9 tabs  PRICE CHECK [id number]  OLANZapine 10 mg oral tablet: 1 tab(s) orally once a day (at bedtime)  OLANZapine 5 mg oral tablet: 1 tab(s) orally once a day  polyethylene glycol 3350 oral powder for reconstitution: 17 gram(s) orally 2 times a day  senna oral tablet: 2 tab(s) orally once a day (at bedtime)  tamsulosin 0.4 mg oral capsule: 1 cap(s) orally once a day (at bedtime)  zolpidem 10 mg oral tablet: 1 tab(s) orally once a day (at bedtime)

## 2022-09-19 NOTE — DISCHARGE NOTE PROVIDER - CARE PROVIDER_API CALL
German Riggs (MD)  ColonRectal Surgery; Plastic Surgery; Surgery; Surgery of the Hand  [address] [facility], [address]  [place] [address]  Phone: [phone]  Fax: [phone]  Follow Up Time: 1 week    Angel Betancourt)  ColonRectal Surgery; Surgery  [facility], [address] [place] [address]  Phone: [phone]  Fax: [phone]  Follow Up Time: 2 weeks   German Riggs)  ColonRectal Surgery; Plastic Surgery; Surgery; Surgery of the Hand  [address] [facility], [address]  [place] [address]  Phone: [phone]  Fax: [phone]  Follow Up Time: 1 week    Angel Betancourt)  ColonRectal Surgery; Surgery  [facility], [address] [place] [address]  Phone: [phone]  Fax: [phone]  Follow Up Time: 2 weeks    [facility],   Phone: [phone]  Fax: (   )    -  Follow Up Time: 2 weeks   German Riggs (MD)  ColonRectal Surgery; Plastic Surgery; Surgery; Surgery of the Hand  [address] [facility], [address]  [place] [address]  Phone: [phone]  Fax: [phone]  Follow Up Time: 1 week    Angel Betancourt)  ColonRectal Surgery; Surgery  [facility], [address] [place] [address]  Phone: [phone]  Fax: [phone]  Follow Up Time: 2 weeks    [facility],   Phone: [phone]  Fax: (   )    -  Follow Up Time: 2 weeks    Britany Carbajal)  Infectious Disease; Internal Medicine  [address] [place] [address]  Phone: [phone]  Fax: [phone]  Follow Up Time: 2 weeks

## 2022-09-19 NOTE — DISCHARGE NOTE PROVIDER - NSDCCPTREATMENT_GEN_ALL_CORE_FT
PRINCIPAL PROCEDURE  Procedure: Drainage, thigh, left, with US guidance  Findings and Treatment:

## 2022-09-19 NOTE — PROGRESS NOTE ADULT - ASSESSMENT
57y Male with Hx rectal/prostate cancer s/p radiation and colostomy, lower GIB, now s/p APR with end colostomy creation, VRAM flap closure on 8/24 c/b L thigh collection that was drained and re-accumulated. Pt sent to ED for collection drainage with interventional radiology.    - flap healthy-appearing, daily baci  - CT showed osteo/abscess   - IR drainage on 9/16, drain placed  - Abx: Zosyn, appreciated ID recs for outpt abx  - lovenox ppx  - f.u colorectal plans  - bladder scan residual: <30, started on flomax. F/u uro outpatient  - Home medications  - consistent carb diet    Maximino Frazier  PRS  [id number]

## 2022-09-19 NOTE — DISCHARGE NOTE PROVIDER - NSDCFUADDINST_GEN_ALL_CORE_FT
[place] Ching drain (TALI Drain)   You are being discharged with a TALI drain. It is important that you measure and record the fluid that is in it (output) on the output record sheet daily. Please bring the output record sheet to your follow-up appointment. Keep the drain secured to your clothing with a safety pin at all times to avoid accidental displacement. Monitor the insertion site for redness, pain, swelling, or purulent drainage.  You may shower with the drain. Wash around the drain with soap and water, pat dry, and reapply dressing. If you noticed a sudden change in the color or amount of fluid in the drain, please contact your surgeon’s office.  Your drain will be removed at an outpatient follow up appointment.    WOUND CARE:  Please keep incisions clean and dry. Please do not Scrub or rub incisions. Do not use lotion or powder on incisions.   BATHING: You may shower and/or sponge bathe. You may use warm soapy water in the shower and rinse, pat dry.  ACTIVITY: No heavy lifting or straining. Otherwise, you may return to your usual level of physical activity. If you are taking narcotic pain medication DO NOT drive a car, operate machinery or make important decisions.  DIET: Return to your usual diet.  NOTIFY YOUR SURGEON IF YOU HAVE: any bleeding that does not stop, any pus draining from your wound(s), any fever (over 100.4 F) persistent nausea/vomiting, or if your pain is not controlled on your discharge pain medications, unable to urinate. Stone Ching drain (TALI Drain)   You are being discharged with a TALI drain. It is important that you measure and record the fluid that is in it (output) on the output record sheet daily. Please bring the output record sheet to your follow-up appointment. Keep the drain secured to your clothing with a safety pin at all times to avoid accidental displacement. Monitor the insertion site for redness, pain, swelling, or purulent drainage.  You may shower with the drain. Wash around the drain with soap and water, pat dry, and reapply dressing. If you noticed a sudden change in the color or amount of fluid in the drain, please contact your surgeon’s office. You will follow up with the Interventional Radiology (IR) office for removal of your new drain. When the output of your drain is less than 10cc in one day, you can arrange for an outpatient appointment. You will need a tube check by the IR team prior to removal of the drain.     WOUND CARE:  Please keep incisions clean and dry. Please do not Scrub or rub incisions. Do not use lotion or powder on incisions.   BATHING: You may shower and/or sponge bathe. You may use warm soapy water in the shower and rinse, pat dry.  ACTIVITY: No heavy lifting or straining. Otherwise, you may return to your usual level of physical activity. If you are taking narcotic pain medication DO NOT drive a car, operate machinery or make important decisions.  DIET: Return to your usual diet.  NOTIFY YOUR SURGEON IF YOU HAVE: any bleeding that does not stop, any pus draining from your wound(s), any fever (over 100.4 F) persistent nausea/vomiting, or if your pain is not controlled on your discharge pain medications, unable to urinate. Stone Ching drain (TALI Drain)   You are being discharged with a TALI drain. It is important that you measure and record the fluid that is in it (output) on the output record sheet daily. Please bring the output record sheet to your follow-up appointment. Keep the drain secured to your clothing with a safety pin at all times to avoid accidental displacement. Monitor the insertion site for redness, pain, swelling, or purulent drainage. You should flush your TALI drain with 5cc of normal saline once per day. You may shower with the drain. Wash around the drain with soap and water, pat dry, and reapply dressing. If you noticed a sudden change in the color or amount of fluid in the drain, please contact your surgeon’s office. You will follow up with the Interventional Radiology (IR) office for removal of your new drain. When the output of your drain is less than 10cc in one day, you can arrange for an outpatient appointment. You will need a tube check by the IR team prior to removal of the drain.     WOUND CARE:  Please keep incisions clean and dry. Please do not Scrub or rub incisions. Do not use lotion or powder on incisions.   BATHING: You may shower and/or sponge bathe. You may use warm soapy water in the shower and rinse, pat dry.  ACTIVITY: No heavy lifting or straining. Otherwise, you may return to your usual level of physical activity. If you are taking narcotic pain medication DO NOT drive a car, operate machinery or make important decisions.  DIET: Return to your usual diet.  NOTIFY YOUR SURGEON IF YOU HAVE: any bleeding that does not stop, any pus draining from your wound(s), any fever (over 100.4 F) persistent nausea/vomiting, or if your pain is not controlled on your discharge pain medications, unable to urinate. Stone Ching drain (TALI Drain)   You are being discharged with a TALI drain. It is important that you measure and record the fluid that is in it (output) on the output record sheet daily. Please bring the output record sheet to your follow-up appointment. Keep the drain secured to your clothing with a safety pin at all times to avoid accidental displacement. Monitor the insertion site for redness, pain, swelling, or purulent drainage. You should flush your TALI drain with 5cc of normal saline once per day. You may shower with the drain. Wash around the drain with soap and water, pat dry, and reapply dressing. If you noticed a sudden change in the color or amount of fluid in the drain, please contact your surgeon’s office. You will follow up with the Interventional Radiology (IR) office for removal of your new drain. When the output of your drain is less than 10cc in one day, you can arrange for an outpatient appointment. You will need a tube check by the IR team prior to removal of the drain.     You will be prescribed Augmentin for 28 days. Please follow up with the infectious disease team in 2 weeks. They will organize an outpatient MRI of your left leg and pelvis.      WOUND CARE:  Please keep incisions clean and dry. Please do not Scrub or rub incisions. Do not use lotion or powder on incisions.   BATHING: You may shower and/or sponge bathe. You may use warm soapy water in the shower and rinse, pat dry.  ACTIVITY: No heavy lifting or straining. Otherwise, you may return to your usual level of physical activity. If you are taking narcotic pain medication DO NOT drive a car, operate machinery or make important decisions.  DIET: Return to your usual diet.  NOTIFY YOUR SURGEON IF YOU HAVE: any bleeding that does not stop, any pus draining from your wound(s), any fever (over 100.4 F) persistent nausea/vomiting, or if your pain is not controlled on your discharge pain medications, unable to urinate. [place]ing drain (TALI Drain)   You are being discharged with a TALI drain. It is important that you measure and record the fluid that is in it (output) on the output record sheet daily. Please bring the output record sheet to your follow-up appointment. Keep the drain secured to your clothing with a safety pin at all times to avoid accidental displacement. Monitor the insertion site for redness, pain, swelling, or purulent drainage. You should flush your TALI drain with 5cc of normal saline once per day. You may shower with the drain. Wash around the drain with soap and water, pat dry, and reapply dressing. If you noticed a sudden change in the color or amount of fluid in the drain, please contact your surgeon’s office. You will follow up with the Interventional Radiology (IR) office for removal of your new drain. When the output of your drain is less than 10cc in one day, you can arrange for an outpatient appointment. You will need a tube check by the IR team prior to removal of the drain.     You will be prescribed Augmentin for 28 days. Please follow up with the infectious disease team in 2 weeks.       WOUND CARE:  Please keep incisions clean and dry. Please do not Scrub or rub incisions. Do not use lotion or powder on incisions.   BATHING: You may shower and/or sponge bathe. You may use warm soapy water in the shower and rinse, pat dry.  ACTIVITY: No heavy lifting or straining. Otherwise, you may return to your usual level of physical activity. If you are taking narcotic pain medication DO NOT drive a car, operate machinery or make important decisions.  DIET: Return to your usual diet.  NOTIFY YOUR SURGEON IF YOU HAVE: any bleeding that does not stop, any pus draining from your wound(s), any fever (over 100.4 F) persistent nausea/vomiting, or if your pain is not controlled on your discharge pain medications, unable to urinate. Stone Ching drain (TALI Drain)   You are being discharged with a TALI drain. It is important that you measure and record the fluid that is in it (output) on the output record sheet daily. Please bring the output record sheet to your follow-up appointment. Keep the drain secured to your clothing with a safety pin at all times to avoid accidental displacement. Monitor the insertion site for redness, pain, swelling, or purulent drainage. You should flush your TALI drain with 5cc of normal saline once per day. You may shower with the drain. Wash around the drain with soap and water, pat dry, and reapply dressing. If you noticed a sudden change in the color or amount of fluid in the drain, please contact your surgeon’s office. You will follow up with the Interventional Radiology (IR) office for removal of your new drain. When the output of your drain is less than 10cc in one day, you can arrange for an outpatient appointment. You will need a tube check by the IR team prior to removal of the drain.     You will be prescribed Augmentin for 28 days. Please follow up with the infectious disease team in 2 weeks.       WOUND CARE:  Please keep incisions clean and dry. Please do not Scrub or rub incisions. Do not use lotion or powder on incisions.   BATHING: You may use sitz bath/shower to clean yourself.  ACTIVITY: No heavy lifting or straining. NO SITTING- patient may lay on back or stand until you follow up with Dr Riggs. Please use assistance with ambulating and moving from laying to standing  DIET: Return to your usual diet.  NOTIFY YOUR SURGEON IF YOU HAVE: any bleeding that does not stop, any pus draining from your wound(s), any fever (over 100.4 F) persistent nausea/vomiting, or if your pain is not controlled on your discharge pain medications, unable to urinate. Stone [place]ing drain (TALI Drain)   You are being discharged with a TALI drain. It is important that you measure and record the fluid that is in it (output) on the output record sheet daily. Please bring the output record sheet to your follow-up appointment. Keep the drain secured to your clothing with a safety pin at all times to avoid accidental displacement. Monitor the insertion site for redness, pain, swelling, or purulent drainage. You should flush your TALI drain with 5cc of normal saline once per day. You may shower with the drain. Wash around the drain with soap and water, pat dry, and reapply dressing. If you noticed a sudden change in the color or amount of fluid in the drain, please contact your surgeon’s office. You will follow up with the Interventional Radiology (IR) office for removal of your new drain. When the output of your drain is less than 10cc in one day, you can arrange for an outpatient appointment. You will need a tube check by the IR team prior to removal of the drain.     You will be prescribed Flagyl/Levofloxacin for 28 days. Please follow up with the infectious disease team outpatient on 10/20/22.       WOUND CARE:  Please keep incisions clean and dry. Please do not Scrub or rub incisions. Do not use lotion or powder on incisions.   BATHING: You may use sitz bath/shower to clean yourself.  ACTIVITY: No heavy lifting or straining. NO SITTING- patient may lay on back or stand until you follow up with Dr Riggs. Please use assistance with ambulating and moving from laying to standing  DIET: Return to your usual diet.  NOTIFY YOUR SURGEON IF YOU HAVE: any bleeding that does not stop, any pus draining from your wound(s), any fever (over 100.4 F) persistent nausea/vomiting, or if your pain is not controlled on your discharge pain medications, unable to urinate.

## 2022-09-19 NOTE — DISCHARGE NOTE PROVIDER - HOSPITAL COURSE
David presented to the ED with a chief complaint of new left groin pain and subjective fevers at home. In the ED, a CT left lower extremity showed a recurrent left groin abscess as well as changes suggestive of osteomyelitis of the left ischial tuberosity. Both interventional radiology and infectious disease were consulted. IR placed a new groin drain on 9/16. The procedure was uncomplicated as was the post procedural course. ID recommended starting on Zosyn and recommended *** for outpatient antibiotics regimen. By discharge, the patient's pain was well controlled, they were eating, stooling, and urinating. They were discharged to home with appropriate follow up and the patient was comfortable with the plan. The patient will follow up with Dr. Riggs in 1 week. David presented to the ED with a chief complaint of new left groin pain and subjective fevers at home. In the ED, a CT left lower extremity showed a recurrent left groin abscess as well as changes suggestive of osteomyelitis of the left ischial tuberosity. Both interventional radiology and infectious disease were consulted. IR placed a new groin drain on 9/16. The procedure was uncomplicated as was the post procedural course. ID recommended starting on Zosyn and recommended *** for outpatient antibiotics regimen. By discharge, the patient's pain was well controlled, they were eating, stooling, and urinating. They were discharged to home with appropriate follow up and the patient was comfortable with the plan. The patient will follow up with Dr. Riggs in 1 week. The patient will also follow up with the Interventional Radiology team when the drain output is less than 10cc in a 24 hour period. David presented to the ED with a chief complaint of new left groin pain and subjective fevers at home. In the ED, a CT left lower extremity showed a recurrent left groin abscess as well as changes suggestive of osteomyelitis of the left ischial tuberosity. Both interventional radiology and infectious disease were consulted. IR placed a new groin drain on 9/16. The procedure was uncomplicated as was the post procedural course. ID recommended starting on Zosyn in patient and then recommended Augmentin for 28 days for outpatient antibiotics regimen. By discharge, the patient's pain was well controlled, they were on a PO diet, stooling, ambulating, and urinating. They were discharged to home with appropriate follow up and the patient was comfortable with the plan. The patient will follow up with Dr. Riggs in 1 week. The patient will also follow up with the Interventional Radiology team when the drain output is less than 10cc in a 24 hour period. David presented to the ED with a chief complaint of new left groin pain and subjective fevers at home. In the ED, a CT left lower extremity showed a recurrent left groin abscess as well as changes suggestive of osteomyelitis of the left ischial tuberosity. Both interventional radiology and infectious disease were consulted. IR placed a new groin drain on 9/16. The procedure was uncomplicated as was the post procedural course. ID recommended starting on Zosyn in patient and then recommended Flagyl/Levofloxacin for 28 days for outpatient antibiotics regimen. By discharge, the patient's pain was well controlled, they were on a PO diet, stooling, ambulating, and urinating. They were discharged to home with appropriate follow up and the patient was comfortable with the plan. The patient will follow up with Dr. Riggs in 1 week. The patient will also follow up with the Interventional Radiology team when the drain output is less than 10cc in a 24 hour period.

## 2022-09-19 NOTE — CHART NOTE - NSCHARTNOTEFT_GEN_A_CORE
QTC reported [id number]  can change zosyn to flagyl 500 mg po q 12 and levofloxacin 500 mg po q 24 h x 28 days, then re-evaluate for additional tx   c/w jacques  has f/u appointment in ID clinic 10/20

## 2022-09-19 NOTE — DISCHARGE NOTE PROVIDER - NSDCCPCAREPLAN_GEN_ALL_CORE_FT
PRINCIPAL DISCHARGE DIAGNOSIS  Diagnosis: Swelling of thigh  Assessment and Plan of Treatment:

## 2022-09-19 NOTE — DISCHARGE NOTE NURSING/CASE MANAGEMENT/SOCIAL WORK - PATIENT PORTAL LINK FT
You can access the FollowMyHealth Patient Portal offered by [facility] by registering at the following website: [email or web address]. By joining [employer]’s FollowMyHealth portal, you will also be able to view your health information using other applications (apps) compatible with our system.

## 2022-09-19 NOTE — PROGRESS NOTE ADULT - ASSESSMENT
57y Male with recurrent abscess s/p left thigh/pelvis abscess drain placement on 9/16 in Interventional Radiology.     Plan:  - Continue drainage, monitor output  - Can be discharged home with drain if outputs remain high  - Flush drain 5cc NS qd  - Will need noncontrast CT + IR tube check once outputs < 10cc/24hr, can be arranged as outpatient follow-up. Outpatient IR office [phone]    [id number]

## 2022-09-19 NOTE — DISCHARGE NOTE PROVIDER - CARE PROVIDERS DIRECT ADDRESSES
,[email or web address],DirectAddress_Unknown,[email or web address]

## 2022-09-19 NOTE — PROGRESS NOTE ADULT - SUBJECTIVE AND OBJECTIVE BOX
Follow Up:  pelvic abscess    Interval History/ROS:  feels ok  s/p IR drain 9/15.  able to ambulate w/o pain    Allergies  Allergy Status Unknown        ANTIMICROBIALS:  bictegravir 50 mG/emtricitabine 200 mG/tenofovir alafenamide 25 mG (BIKTARVY) 1 daily  piperacillin/tazobactam IVPB.. 3.375 every 8 hours      OTHER MEDS:  acetaminophen     Tablet .. 975 milliGRAM(s) Oral every 6 hours  ALPRAZolam 0.5 milliGRAM(s) Oral three times a day PRN  amLODIPine   Tablet 5 milliGRAM(s) Oral daily  aspirin  chewable 81 milliGRAM(s) Oral daily  atorvastatin 10 milliGRAM(s) Oral at bedtime  enoxaparin Injectable 40 milliGRAM(s) SubCutaneous every 24 hours  ibuprofen  Tablet. 400 milliGRAM(s) Oral every 6 hours  influenza   Vaccine 0.5 milliLiter(s) IntraMuscular once  melatonin 3 milliGRAM(s) Oral at bedtime PRN  metoprolol succinate ER 50 milliGRAM(s) Oral daily  OLANZapine 10 milliGRAM(s) Oral at bedtime  oxyCODONE    IR 5 milliGRAM(s) Oral every 4 hours PRN  oxyCODONE    IR 10 milliGRAM(s) Oral every 4 hours PRN  polyethylene glycol 3350 17 Gram(s) Oral two times a day PRN  senna 2 Tablet(s) Oral at bedtime PRN  tamsulosin 0.4 milliGRAM(s) Oral at bedtime  zolpidem 5 milliGRAM(s) Oral at bedtime PRN      Vital Signs Last 24 Hrs  T(C): 36.7 (19 Sep 2022 10:15), Max: 37.2 (19 Sep 2022 06:10)  T(F): 98 (19 Sep 2022 10:15), Max: 99 (19 Sep 2022 06:10)  HR: 124 (19 Sep 2022 10:15) (95 - 124)  BP: 115/82 (19 Sep 2022 10:15) (115/82 - 128/94)  BP(mean): --  RR: 17 (19 Sep 2022 10:15) (17 - 20)  SpO2: 98% (19 Sep 2022 10:15) (97% - 99%)    Parameters below as of 19 Sep 2022 10:15  Patient On (Oxygen Delivery Method): room air        PHYSICAL EXAM:  Constitutional: Not in acute distress  Eyes: No icterus.  Oral cavity: Clear, no lesions  Neck: Supple  RS: Chest clear   CVS: S1, S2   Abdomen: Soft. No guarding/rigidity/tenderness.  left groin drain   Extremities: Warm. No pedal edema  Skin: No lesions noted  Vascular: No evidence of phlebitis  Neuro: Alert, oriented to time/place/person  Cranial nerves 2-12 grossly normal. No focal abnormalities                          9.6    4.58  )-----------( 510      ( 18 Sep 2022 09:18 )             31.2                   MICROBIOLOGY:  v  .Abscess LEFT PELVIC ABS  09-16-22   No growth  --  --      .Blood Blood-Peripheral  09-15-22   No growth to date.  --  --      .Body Fluid LEFT TIGH DRAINAGE  08-31-22   Few Corynebacterium species "Susceptibilities not performed"  Few Bacteroides vulgatus group "Susceptibilities not performed"  Rare Clostridium cadaveris "Susceptibilities not performed"  Organism seen in Gram stain is non-viable after prolonged  incubation and repeated subculture.  --    Moderate polymorphonuclear leukocytes per low power field  Rare Gram positive cocci in pairs per oil power field      .Blood Blood  08-29-22   No Growth Final  --  --      .Blood Blood  08-29-22   No Growth Final  --  --      .Blood Blood-Venous  08-27-22   No Growth Final  --  --        HIV-1 RNA Quantitative, Viral Load Log: DET.  <1.47 lg /mL (04-21-22 @ 15:23)  HIV-1 RNA Quantitative, Viral Load Log: NOT DET. lg /mL (03-18-22 @ 12:31)          RADIOLOGY:    < from: CT Lower Extremity w/ IV Cont, Left (09.15.22 @ 19:47) >    ACC: [id number] EXAM:  CT LWR EXT IC LT                          PROCEDURE DATE:  09/15/2022          INTERPRETATION:  CT LEFT FEMUR WITH IV CONTRAST    HISTORY: Pain. Left thigh abscess.    TECHNIQUE: Contiguous axial imaging was performed through the left femur   with 90 cc Omnipaque 350 intravenous contrast. Femoral condyles are not   included within the field-of-view. Coronal and sagittal reformatting was   utilized.    COMPARISON:  CT of the pelvis from the same day and CT of the chest   abdomen and pelvis dated 8/30/2022.    FINDINGS    OSSEOUS STRUCTURES    Fractures:  None.    Erosions:  There are increased cortical erosive changes of the left   ischial tuberosity adjacent to the pelvic abscess consistent with   osteomyelitis.    VISUALIZED PELVIC JOINTS    Symphysis Pubis:  Preserved.    Visualized Sacroiliac Joint:  Preserved.    HIP JOINT    Morphology: Normal.    Avascular Necrosis: None.    Joint Space: Maintained.    Intra-articular Bodies: None.    Effusion/Synovitis:  None.    SOFT TISSUES    Neurovascular:  Mild atherosclerotic calcifications are present.    Pelvis/Abdomen: Patient is status post abdominal perineal resection   with left lower quadrant colostomy and rectal rectus abdominis   musculocutaneous flap reconstruction. Surrounding presacral and pelvic   abscess collection is again seen with interval removal of the drainage   catheter. There are curvilinear tubular components extend from the   presacral region to the anterior abdominal wall with the largest   component in the presacral region measuring approximately 2.2 x 6.7 cm   axially. This collection extends through the left pelvic floor and into   the posterior flap and left medial thigh with involvement of the gracilis   and adductor stacy muscles measuring up to 6.9 x 3.0 cm axially within   the medial thigh. Together the collection measures at least 22.6 cm   craniocaudally along the anterior margin of the sacrum and coccyx into   the medial thigh. Changes are similar to the prior.    Musculature:  Postsurgical changes are present involving the anterior   abdominal wall.    Subcutaneous Tissues:  Mild subcutaneous edema is present. There is   moderate to severe scrotal wall edema.    IMPRESSION:  1. Patient status post abdominal perineal resection with vertical rectus   abdominis musculocutaneous flap reconstruction.  2. Associated pelvic/presacral abscess is again seen extending into the   medial left thigh with involvement of the left gracilis and adductor   stacy muscles. Thisis relatively stable in size.  3. There is increased cortical erosive change of the left ischial   tuberosity adjacent to the abscess collection consistent with   osteomyelitis.    --- End of Report ---            GABRIEL RAJAN MD; Attending Radiologist  This document has been electronically signed. Sep 16 2022  8:42AM    < end of copied text >  
Plastic Surgery    SUBJECTIVE: Pt seen and examined on rounds with team. No acute events overnight.        OBJECTIVE    PHYSICAL EXAM:   General: NAD, resting comfortably  Pulmonary: Nonlabored breathing, no respiratory distress  Abdominal: Incision is clean, dry, intact, abd soft, NT, ostomy bag place  Buttock: flap viable, good flap refill, incision c/d/i, IR drain in place, dressing cdi  Extremities: LLE: groin/upper medial thigh now soft without collection    VITALS  Vital Signs Last 24 Hrs  T(C): 37.2 (19 Sep 2022 06:10), Max: 37.2 (19 Sep 2022 06:10)  T(F): 99 (19 Sep 2022 06:10), Max: 99 (19 Sep 2022 06:10)  HR: 102 (19 Sep 2022 06:10) (95 - 107)  BP: 122/85 (19 Sep 2022 06:10) (118/84 - 128/94)  BP(mean): --  ABP: --  ABP(mean): --  RR: 18 (19 Sep 2022 06:10) (18 - 20)  SpO2: 99% (19 Sep 2022 06:10) (97% - 99%)    O2 Parameters below as of 19 Sep 2022 06:10  Patient On (Oxygen Delivery Method): room air      MEDICATIONS (STANDING): acetaminophen     Tablet .. 975 milliGRAM(s) Oral every 6 hours  amLODIPine   Tablet 5 milliGRAM(s) Oral daily  aspirin  chewable 81 milliGRAM(s) Oral daily  I&O's Detail    18 Sep 2022 07:01  -  19 Sep 2022 07:00  --------------------------------------------------------  IN:    IV PiggyBack: 200 mL    Oral Fluid: 1080 mL  Total IN: 1280 mL    OUT:    Drain (mL): 70 mL    Voided (mL): 2500 mL  Total OUT: 2570 mL    Total NET: -1290 mL      atorvastatin 10 milliGRAM(s) Oral at bedtime  bictegravir 50 mG/emtricitabine 200 mG/tenofovir alafenamide 25 mG (BIKTARVY) 1 Tablet(s) Oral daily  enoxaparin Injectable 40 milliGRAM(s) SubCutaneous every 24 hours  ibuprofen  Tablet. 400 milliGRAM(s) Oral every 6 hours  influenza   Vaccine 0.5 milliLiter(s) IntraMuscular once  metoprolol succinate ER 50 milliGRAM(s) Oral daily  OLANZapine 10 milliGRAM(s) Oral at bedtime  piperacillin/tazobactam IVPB.. 3.375 Gram(s) IV Intermittent every 8 hours  tamsulosin 0.4 milliGRAM(s) Oral at bedtime    MEDICATIONS (PRN):ALPRAZolam 0.5 milliGRAM(s) Oral three times a day PRN anxiety  melatonin 3 milliGRAM(s) Oral at bedtime PRN Sleep  oxyCODONE    IR 5 milliGRAM(s) Oral every 4 hours PRN Moderate Pain (4 - 6)  oxyCODONE    IR 10 milliGRAM(s) Oral every 4 hours PRN Severe Pain (7 - 10)  polyethylene glycol 3350 17 Gram(s) Oral two times a day PRN Constipation  senna 2 Tablet(s) Oral at bedtime PRN Constipation  zolpidem 5 milliGRAM(s) Oral at bedtime PRN Insomnia      LABS                          9.6    4.58  )-----------( 510      ( 18 Sep 2022 09:18 )             31.2       IMAGING STUDIES    
Plastic Surgery Progress Note (pg [facility]: [id number], [place]: [phone])    SUBJECTIVE  The patient was seen and examined. No acute events overnight.    OBJECTIVE  ___________________________________________________  VITAL SIGNS / I&O's   Vital Signs Last 24 Hrs  T(C): 36.9 (16 Sep 2022 06:00), Max: 36.9 (15 Sep 2022 13:18)  T(F): 98.4 (16 Sep 2022 06:00), Max: 98.5 (15 Sep 2022 13:18)  HR: 102 (16 Sep 2022 06:00) (102 - 135)  BP: 97/82 (16 Sep 2022 06:00) (97/82 - 115/68)  BP(mean): --  RR: 18 (16 Sep 2022 06:00) (16 - 18)  SpO2: 98% (16 Sep 2022 06:00) (98% - 100%)    Parameters below as of 16 Sep 2022 06:00  Patient On (Oxygen Delivery Method): room air          15 Sep 2022 07:01  -  16 Sep 2022 06:59  --------------------------------------------------------  IN:    Lactated Ringers: 700 mL    Oral Fluid: 340 mL  Total IN: 1040 mL    OUT:    Voided (mL): 300 mL  Total OUT: 300 mL    Total NET: 740 mL        ___________________________________________________  PHYSICAL EXAM    PHYSICAL EXAM:  General: NAD, resting comfortably  Pulmonary: Nonlabored breathing, no respiratory distress  Abdominal: Incision is clean, dry, intact, abd soft, NT, ostomy bag place  Buttock: flap viable, good flap refill, incision c/d/i  Extremities: LLE: groin/upper medial thigh with palpable collection, induration    ___________________________________________________  LABS                        9.3    13.16 )-----------( 510      ( 15 Sep 2022 16:30 )             30.9     15 Sep 2022 16:30    138    |  99     |  13     ----------------------------<  139    4.4     |  26     |  0.77     Ca    9.9        15 Sep 2022 16:30  Mg     2.20      15 Sep 2022 16:30    TPro  7.9    /  Alb  3.3    /  TBili  0.3    /  DBili  x      /  AST  13     /  ALT  20     /  AlkPhos  130    15 Sep 2022 16:30      CAPILLARY BLOOD GLUCOSE              ___________________________________________________  MICRO  Recent Cultures:    ___________________________________________________  MEDICATIONS  (STANDING):  acetaminophen     Tablet .. 975 milliGRAM(s) Oral every 6 hours  amLODIPine   Tablet 5 milliGRAM(s) Oral daily  aspirin  chewable 81 milliGRAM(s) Oral daily  atorvastatin 10 milliGRAM(s) Oral at bedtime  bictegravir 50 mG/emtricitabine 200 mG/tenofovir alafenamide 25 mG (BIKTARVY) 1 Tablet(s) Oral daily  ibuprofen  Tablet. 400 milliGRAM(s) Oral every 6 hours  influenza   Vaccine 0.5 milliLiter(s) IntraMuscular once  lactated ringers. 1000 milliLiter(s) (100 mL/Hr) IV Continuous <Continuous>  metoprolol succinate ER 50 milliGRAM(s) Oral daily  OLANZapine 10 milliGRAM(s) Oral at bedtime  piperacillin/tazobactam IVPB.. 3.375 Gram(s) IV Intermittent every 8 hours    MEDICATIONS  (PRN):  ALPRAZolam 0.5 milliGRAM(s) Oral three times a day PRN anxiety  melatonin 3 milliGRAM(s) Oral at bedtime PRN Sleep  oxyCODONE    IR 5 milliGRAM(s) Oral every 4 hours PRN Moderate Pain (4 - 6)  oxyCODONE    IR 10 milliGRAM(s) Oral every 4 hours PRN Severe Pain (7 - 10)  polyethylene glycol 3350 17 Gram(s) Oral two times a day PRN Constipation  senna 2 Tablet(s) Oral at bedtime PRN Constipation  zolpidem 5 milliGRAM(s) Oral at bedtime PRN Insomnia  
Plastic Surgery    SUBJECTIVE: Pt seen and examined on rounds with team. No acute events overnight.        OBJECTIVE    PHYSICAL EXAM:   General: NAD, resting comfortably  Pulmonary: Nonlabored breathing, no respiratory distress  Abdominal: Incision is clean, dry, intact, abd soft, NT, ostomy bag place  Buttock: flap viable, good flap refill, incision c/d/i  Extremities: LLE: groin/upper medial thigh with palpable collection, induration    VITALS  T(C): 37.1 (09-18-22 @ 05:40), Max: 37.4 (09-17-22 @ 10:30)  HR: 103 (09-18-22 @ 05:40) (100 - 109)  BP: 120/87 (09-18-22 @ 05:40) (97/55 - 124/87)  RR: 18 (09-18-22 @ 05:40) (18 - 18)  SpO2: 96% (09-18-22 @ 05:40) (96% - 100%)  CAPILLARY BLOOD GLUCOSE          Is/Os    09-17 @ 07:01  -  09-18 @ 07:00  --------------------------------------------------------  IN:    IV PiggyBack: 200 mL    Oral Fluid: 630 mL  Total IN: 830 mL    OUT:    Drain (mL): 30 mL    Voided (mL): 1050 mL  Total OUT: 1080 mL    Total NET: -250 mL          MEDICATIONS (STANDING): acetaminophen     Tablet .. 975 milliGRAM(s) Oral every 6 hours  amLODIPine   Tablet 5 milliGRAM(s) Oral daily  aspirin  chewable 81 milliGRAM(s) Oral daily  atorvastatin 10 milliGRAM(s) Oral at bedtime  bictegravir 50 mG/emtricitabine 200 mG/tenofovir alafenamide 25 mG (BIKTARVY) 1 Tablet(s) Oral daily  enoxaparin Injectable 40 milliGRAM(s) SubCutaneous every 24 hours  ibuprofen  Tablet. 400 milliGRAM(s) Oral every 6 hours  influenza   Vaccine 0.5 milliLiter(s) IntraMuscular once  metoprolol succinate ER 50 milliGRAM(s) Oral daily  OLANZapine 10 milliGRAM(s) Oral at bedtime  piperacillin/tazobactam IVPB.. 3.375 Gram(s) IV Intermittent every 8 hours  tamsulosin 0.4 milliGRAM(s) Oral at bedtime    MEDICATIONS (PRN):ALPRAZolam 0.5 milliGRAM(s) Oral three times a day PRN anxiety  melatonin 3 milliGRAM(s) Oral at bedtime PRN Sleep  oxyCODONE    IR 5 milliGRAM(s) Oral every 4 hours PRN Moderate Pain (4 - 6)  oxyCODONE    IR 10 milliGRAM(s) Oral every 4 hours PRN Severe Pain (7 - 10)  polyethylene glycol 3350 17 Gram(s) Oral two times a day PRN Constipation  senna 2 Tablet(s) Oral at bedtime PRN Constipation  zolpidem 5 milliGRAM(s) Oral at bedtime PRN Insomnia      LABS                  IMAGING STUDIES    
Plastic Surgery    SUBJECTIVE: Pt seen and examined on rounds with team. No acute events overnight.        OBJECTIVE    PHYSICAL EXAM:  General: NAD, resting comfortably  Pulmonary: Nonlabored breathing, no respiratory distress  Abdominal: Incision is clean, dry, intact, abd soft, NT, ostomy bag place  Buttock: flap viable, good flap refill, incision c/d/i  Extremities: LLE: groin/upper medial thigh with palpable collection, induration    VITALS  T(C): 36.9 (09-17-22 @ 05:52), Max: 37.4 (09-17-22 @ 01:47)  HR: 112 (09-17-22 @ 05:52) (100 - 115)  BP: 124/66 (09-17-22 @ 05:52) (107/66 - 128/91)  RR: 18 (09-17-22 @ 05:52) (18 - 20)  SpO2: 97% (09-17-22 @ 05:52) (97% - 100%)  CAPILLARY BLOOD GLUCOSE          Is/Os    09-16 @ 07:01  -  09-17 @ 07:00  --------------------------------------------------------  IN:    IV PiggyBack: 100 mL  Total IN: 100 mL    OUT:    Drain (mL): 150 mL    Oral Fluid: 0 mL    Voided (mL): 850 mL  Total OUT: 1000 mL    Total NET: -900 mL          MEDICATIONS (STANDING): acetaminophen     Tablet .. 975 milliGRAM(s) Oral every 6 hours  amLODIPine   Tablet 5 milliGRAM(s) Oral daily  aspirin  chewable 81 milliGRAM(s) Oral daily  atorvastatin 10 milliGRAM(s) Oral at bedtime  bictegravir 50 mG/emtricitabine 200 mG/tenofovir alafenamide 25 mG (BIKTARVY) 1 Tablet(s) Oral daily  enoxaparin Injectable 40 milliGRAM(s) SubCutaneous every 24 hours  ibuprofen  Tablet. 400 milliGRAM(s) Oral every 6 hours  influenza   Vaccine 0.5 milliLiter(s) IntraMuscular once  metoprolol succinate ER 50 milliGRAM(s) Oral daily  OLANZapine 10 milliGRAM(s) Oral at bedtime  piperacillin/tazobactam IVPB.. 3.375 Gram(s) IV Intermittent every 8 hours  tamsulosin 0.4 milliGRAM(s) Oral at bedtime    MEDICATIONS (PRN):ALPRAZolam 0.5 milliGRAM(s) Oral three times a day PRN anxiety  melatonin 3 milliGRAM(s) Oral at bedtime PRN Sleep  oxyCODONE    IR 5 milliGRAM(s) Oral every 4 hours PRN Moderate Pain (4 - 6)  oxyCODONE    IR 10 milliGRAM(s) Oral every 4 hours PRN Severe Pain (7 - 10)  polyethylene glycol 3350 17 Gram(s) Oral two times a day PRN Constipation  senna 2 Tablet(s) Oral at bedtime PRN Constipation  zolpidem 5 milliGRAM(s) Oral at bedtime PRN Insomnia      LABS  CBC (09-16 @ 06:08)                              8.2<L>                         9.01    )----------------(  452<H>     --    % Neutrophils, --    % Lymphocytes, ANC: --                                  27.1<L>    BMP (09-16 @ 06:08)             135     |  99      |  16    		Ca++ --      Ca 9.2                ---------------------------------( 99    		Mg --                 3.8     |  27      |  0.74  			Ph --        LFTs (09-16 @ 06:08)      TPro 7.2 / Alb 3.1<L> / TBili 0.2 / DBili -- / AST 14 / ALT 19 / AlkPhos 112    Coags (09-16 @ 06:08)  aPTT 27.0 / INR 1.35<H> / PT 15.7<H>            IMAGING STUDIES    
  57y Male s/p left thigh/pelvis abscess drain placement on 9/16 in Interventional Radiology.   Patient seen and examined bedside resting comfortably. No complaints offered.    T(F): 98 (09-19-22 @ 10:15), Max: 99 (09-19-22 @ 06:10)  HR: 124 (09-19-22 @ 10:15) (95 - 124)  BP: 115/82 (09-19-22 @ 10:15) (115/82 - 128/94)  RR: 17 (09-19-22 @ 10:15) (17 - 20)  SpO2: 98% (09-19-22 @ 10:15) (97% - 99%)  Wt(kg): --    LABS:                        9.6    4.58  )-----------( 510      ( 18 Sep 2022 09:18 )             31.2             I&O's Detail    18 Sep 2022 07:01  -  19 Sep 2022 07:00  --------------------------------------------------------  IN:    IV PiggyBack: 200 mL    Oral Fluid: 1080 mL  Total IN: 1280 mL    OUT:    Drain (mL): 70 mL    Voided (mL): 2500 mL  Total OUT: 2570 mL    Total NET: -1290 mL      19 Sep 2022 07:01  -  19 Sep 2022 11:55  --------------------------------------------------------  IN:    Oral Fluid: 360 mL  Total IN: 360 mL    OUT:    Voided (mL): 500 mL  Total OUT: 500 mL    Total NET: -140 mL            PHYSICAL EXAM:  General: Nontoxic, in NAD  Left pelvic drain: dressing c/d/i, flushed with 5cc NS        
CC: 57y old Male admitted with a chief complaint of IR Drain, now    HPI: 57y Male with Hx tachycardia, HIV, DMII, rectal/prostate cancer s/p radiation and colostomy, lower GIB, now s/p APR with end colostomy creation, VRAM flap closure on 8/24 presents for drainage of collection. Patient initially developed L thigh collection post-operatively, which was drained during admission by interventional radiology on 8/30 with the drain left in.  Patient was initially discharged on 9/2 and seen outpatient, where a the drain was removed. Patient developed recollection and could not tolerate outpatient drainage, and was advised to come to the emergency room and for admission for collection drainage by interventional radiology. Patient complains of thigh tenderness, but denies other symptoms including fever, nausea, PO tolerance.     Subjective: Patient seen and examined. No events overnight. No complaints of pain, N,V. Tolerating diet.    Physical Exam  T(C): 37.2 (09-19-22 @ 06:10), Max: 37.2 (09-19-22 @ 06:10)  HR: 102 (09-19-22 @ 06:10) (95 - 107)  BP: 122/85 (09-19-22 @ 06:10) (118/84 - 128/94)  RR: 18 (09-19-22 @ 06:10) (18 - 20)  SpO2: 99% (09-19-22 @ 06:10) (97% - 99%)  Wt(kg): --    09-18 @ 07:01  -  09-19 @ 07:00  --------------------------------------------------------  IN:    IV PiggyBack: 200 mL    Oral Fluid: 1080 mL  Total IN: 1280 mL    OUT:    Drain (mL): 70 mL    Voided (mL): 2500 mL  Total OUT: 2570 mL    Total NET: -1290 mL        General: well developed, well nourished, NAD  Neuro: alert and oriented, no focal deficits, moves all extremities spontaneously  HEENT: NCAT, EOMI, anicteric, mucosa moist  Respiratory: airway patent, respirations unlabored  CVS: regular rate and rhythm  Abdomen: soft, nontender, nondistended, ostomy pink and functioning  Extremities: left medial upper thigh induration improving, IR drain in place draining seropurulent. No pain.  Skin: warm, dry, appropriate color    Labs:      Imaging and other studies:

## 2022-09-19 NOTE — DISCHARGE NOTE PROVIDER - PROVIDER TOKENS
PROVIDER:[TOKEN:[[id number]],FOLLOWUP:[1 week]],PROVIDER:[TOKEN:[[id number]],FOLLOWUP:[2 weeks]] PROVIDER:[TOKEN:[[id number]],FOLLOWUP:[1 week]],PROVIDER:[TOKEN:[[id number]],FOLLOWUP:[2 weeks]],FREE:[LAST:[[facility]],PHONE:[phone]],FAX:[(   )    -],FOLLOWUP:[2 weeks]] PROVIDER:[TOKEN:[[id number]],FOLLOWUP:[1 week]],PROVIDER:[TOKEN:[[id number]],FOLLOWUP:[2 weeks]],FREE:[LAST:[[facility]],PHONE:[phone]],FAX:[(   )    -],FOLLOWUP:[2 weeks]],PROVIDER:[TOKEN:[[id number]],FOLLOWUP:[2 weeks]]

## 2022-09-19 NOTE — DISCHARGE NOTE PROVIDER - NSDCFUSCHEDAPPT_GEN_ALL_CORE_FT
[facility] Physician [facility]  PLASTICSUR [address]  Scheduled Appointment: 09/22/2022    Ronald Gil  [facility] Physician [facility]  INTMED [id number]  Scheduled Appointment: 09/23/2022    Denise Gordon  [facility]  INFDISEASE [address]  Scheduled Appointment: 10/20/2022    Kenny Lim  [facility]  CARDIOLOGY [id number]  Scheduled Appointment: 11/02/2022

## 2022-09-19 NOTE — PROGRESS NOTE ADULT - ASSESSMENT
57 year old Male with PMHx of HIV on Biktarvy , DMII, rectal/prostate cancer s/p radiation and colostomy, lower GIB, now s/p APR with end colostomy creation, VRAM flap closure on 8/24 presents for drainage of collection. Patient initially developed L thigh collection post-operatively, which was drained during admission by interventional radiology on 8/30 with the drain left in.  Patient was initially discharged on 9/2 and seen outpatient, where a the drain was removed. Patient developed recollection and could not tolerate outpatient drainage, and was advised to come to the emergency room and for admission for collection drainage by interventional radiology.    Afebrile  Leukocytosis on 9/15 to 13.16K - trended normal     Abscess Cx from previous admission on 8/30: Corynebacterium species, Bacteroids species, Clostridium Cadaveris   Blood Cx from previous admission: NG    CT L LE with IV contrast: s/p abdominal perineal resection with flap reconstruction, pelvic/presacral abscess is again seen extending into the medial left thigh with involvement of the left gracilis and adductor stacy muscles, stable in size, increased cortical erosive change of the left ischial tuberosity adjacent to the abscess collection consistent with osteomyelitis.      s/p IR for abscess drainage 9/15- culture no growth to date  Started on Zosyn on 9/15-->    #pelvic/presacral abscess   #Ischial bone erosion adjacent to abscess - represent OM   #HIV - Last VL <30, CD4 count 400 April 2022    Recommendations:   Continue Zosyn 3.375 g IV q 8hrs   Continue Biktarvy  check EKG for QT interval; if normal range flagyl and levaquin po

## 2022-09-20 ENCOUNTER — APPOINTMENT (OUTPATIENT)
Dept: CT IMAGING | Facility: HOSPITAL | Age: 57
End: 2022-09-20

## 2022-09-20 ENCOUNTER — NON-APPOINTMENT (OUTPATIENT)
Age: 57
End: 2022-09-20

## 2022-09-21 LAB
CULTURE RESULTS: SIGNIFICANT CHANGE UP
SPECIMEN SOURCE: SIGNIFICANT CHANGE UP

## 2022-09-23 ENCOUNTER — APPOINTMENT (OUTPATIENT)
Dept: INTERNAL MEDICINE | Facility: CLINIC | Age: 57
End: 2022-09-23

## 2022-09-23 ENCOUNTER — LABORATORY RESULT (OUTPATIENT)
Age: 57
End: 2022-09-23

## 2022-09-23 VITALS
TEMPERATURE: 97.5 F | SYSTOLIC BLOOD PRESSURE: 112 MMHG | OXYGEN SATURATION: 99 % | WEIGHT: 189 LBS | DIASTOLIC BLOOD PRESSURE: 87 MMHG | HEART RATE: 120 BPM | HEIGHT: 71 IN | BODY MASS INDEX: 26.46 KG/M2

## 2022-09-23 DIAGNOSIS — Z23 ENCOUNTER FOR IMMUNIZATION: ICD-10-CM

## 2022-09-23 PROCEDURE — 99496 TRANSJ CARE MGMT HIGH F2F 7D: CPT | Mod: 25

## 2022-09-23 PROCEDURE — 36415 COLL VENOUS BLD VENIPUNCTURE: CPT

## 2022-09-23 PROCEDURE — 90686 IIV4 VACC NO PRSV 0.5 ML IM: CPT

## 2022-09-23 PROCEDURE — G0008: CPT

## 2022-09-23 RX ORDER — LEVOFLOXACIN 500 MG/1
500 TABLET, FILM COATED ORAL
Refills: 0 | Status: ACTIVE | COMMUNITY
Start: 2022-09-23

## 2022-09-24 NOTE — PHYSICAL EXAM
[Normal Sclera/Conjunctiva] : normal sclera/conjunctiva [Soft] : abdomen soft [Normal] : no joint swelling and grossly normal strength and tone [de-identified] : Laying on his right side, unable to sit b/c of left drain on left glute [de-identified] : ostomy on RLQ

## 2022-09-24 NOTE — HEALTH RISK ASSESSMENT
[Intercurrent ED visits] : went to ED [Former] : Former [Never (0 pts)] : Never (0 points) [No] : In the past 12 months have you used drugs other than those required for medical reasons? No [No falls in past year] : Patient reported no falls in the past year [0] : 2) Feeling down, depressed, or hopeless: Not at all (0) [de-identified] : [facility] 9/15/2022 IR Drain  [de-identified] : Dr Riggs(Plastic surgeon) and Dr Betancourt Colon Surgeon  [Audit-CScore] : 0 [de-identified] : not active  [de-identified] : good  [[id number]] : 0

## 2022-09-24 NOTE — HISTORY OF PRESENT ILLNESS
[Spouse] : spouse [FreeTextEntry1] : Post discharge follow-up [de-identified] :  David Turcios with PMHx of Anal Cancer, colon cancer, HIV, HTN,   present with spouse for post discharge follow-up. Patient has no medical complaint. History is given by wife. Patient had 3 hospitalizations last discharge was Sept 18. Patient was given 8 unit of blood. Had a VRAM FLAP CLOSURE ON 8/24. He has drain bag, he retained fluid on left upper groin due to osteomyelitis and abscess. bag might be removed soon \par

## 2022-09-24 NOTE — ASSESSMENT
[FreeTextEntry1] : 58 yo male with multiple health conditions presents with wife for post discharge follow-up. \par Patient was laying on his right side on the exam table.\par Patient is in no acute distress, asymptomatic.\par Patient has a permanent ostomy bag on RLQ, a drain bag on left glutea\par Blood work collected\par Flu shot given

## 2022-09-28 NOTE — PROVIDER CONTACT NOTE (OTHER) - ASSESSMENT
Arranged [facility] taxi through [facility] call Inv# [id number]. P/U 6:15.
Patient axox4. VSS however HR elevated. Left buttock dsg intact and drain with serosanguinous drainage
28-Sep-2022 00:56

## 2022-09-29 ENCOUNTER — APPOINTMENT (OUTPATIENT)
Dept: PLASTIC SURGERY | Facility: CLINIC | Age: 57
End: 2022-09-29

## 2022-09-29 ENCOUNTER — NON-APPOINTMENT (OUTPATIENT)
Age: 57
End: 2022-09-29

## 2022-09-29 PROCEDURE — 99024 POSTOP FOLLOW-UP VISIT: CPT

## 2022-09-30 ENCOUNTER — OUTPATIENT (OUTPATIENT)
Dept: OUTPATIENT SERVICES | Facility: HOSPITAL | Age: 57
LOS: 1 days | End: 2022-09-30

## 2022-09-30 ENCOUNTER — RESULT REVIEW (OUTPATIENT)
Age: 57
End: 2022-09-30

## 2022-09-30 ENCOUNTER — APPOINTMENT (OUTPATIENT)
Dept: CT IMAGING | Facility: HOSPITAL | Age: 57
End: 2022-09-30

## 2022-09-30 VITALS
OXYGEN SATURATION: 100 % | RESPIRATION RATE: 20 BRPM | SYSTOLIC BLOOD PRESSURE: 125 MMHG | TEMPERATURE: 98 F | HEART RATE: 107 BPM | DIASTOLIC BLOOD PRESSURE: 95 MMHG

## 2022-09-30 VITALS
DIASTOLIC BLOOD PRESSURE: 81 MMHG | HEART RATE: 116 BPM | SYSTOLIC BLOOD PRESSURE: 101 MMHG | OXYGEN SATURATION: 100 % | TEMPERATURE: 98 F | RESPIRATION RATE: 20 BRPM

## 2022-09-30 DIAGNOSIS — K62.9 DISEASE OF ANUS AND RECTUM, UNSPECIFIED: Chronic | ICD-10-CM

## 2022-09-30 DIAGNOSIS — C20 MALIGNANT NEOPLASM OF RECTUM: ICD-10-CM

## 2022-09-30 PROCEDURE — 76080 X-RAY EXAM OF FISTULA: CPT | Mod: 26

## 2022-09-30 PROCEDURE — 49424 ASSESS CYST CONTRAST INJECT: CPT

## 2022-10-01 LAB
CULTURE RESULTS: SIGNIFICANT CHANGE UP
SPECIMEN SOURCE: SIGNIFICANT CHANGE UP

## 2022-10-06 DIAGNOSIS — Z46.82 ENCOUNTER FOR FITTING AND ADJUSTMENT OF NON-VASCULAR CATHETER: ICD-10-CM

## 2022-10-07 ENCOUNTER — RESULT REVIEW (OUTPATIENT)
Age: 57
End: 2022-10-07

## 2022-10-07 ENCOUNTER — OUTPATIENT (OUTPATIENT)
Dept: OUTPATIENT SERVICES | Facility: HOSPITAL | Age: 57
LOS: 1 days | End: 2022-10-07

## 2022-10-07 VITALS
HEART RATE: 101 BPM | OXYGEN SATURATION: 98 % | RESPIRATION RATE: 20 BRPM | SYSTOLIC BLOOD PRESSURE: 122 MMHG | TEMPERATURE: 98 F | DIASTOLIC BLOOD PRESSURE: 96 MMHG

## 2022-10-07 VITALS
RESPIRATION RATE: 18 BRPM | OXYGEN SATURATION: 98 % | HEART RATE: 99 BPM | DIASTOLIC BLOOD PRESSURE: 54 MMHG | SYSTOLIC BLOOD PRESSURE: 115 MMHG

## 2022-10-07 DIAGNOSIS — K62.9 DISEASE OF ANUS AND RECTUM, UNSPECIFIED: Chronic | ICD-10-CM

## 2022-10-07 PROCEDURE — 49424 ASSESS CYST CONTRAST INJECT: CPT

## 2022-10-07 PROCEDURE — 76080 X-RAY EXAM OF FISTULA: CPT | Mod: 26

## 2022-10-09 LAB
ALBUMIN SERPL ELPH-MCNC: 3.4 G/DL
ALP BLD-CCNC: 106 U/L
ALT SERPL-CCNC: 17 U/L
ANION GAP SERPL CALC-SCNC: 10 MMOL/L
AST SERPL-CCNC: 13 U/L
BASOPHILS # BLD AUTO: 0 K/UL
BASOPHILS NFR BLD AUTO: 0 %
BILIRUB SERPL-MCNC: <0.2 MG/DL
BUN SERPL-MCNC: 13 MG/DL
CALCIUM SERPL-MCNC: 9.1 MG/DL
CHLORIDE SERPL-SCNC: 106 MMOL/L
CHOLEST SERPL-MCNC: 106 MG/DL
CO2 SERPL-SCNC: 26 MMOL/L
CREAT SERPL-MCNC: 0.62 MG/DL
EGFR: 112 ML/MIN/1.73M2
EOSINOPHIL # BLD AUTO: 0.11 K/UL
EOSINOPHIL NFR BLD AUTO: 1.6 %
GLUCOSE SERPL-MCNC: 106 MG/DL
HCT VFR BLD CALC: 30.5 %
HDLC SERPL-MCNC: 32 MG/DL
HGB BLD-MCNC: 9.1 G/DL
LDLC SERPL CALC-MCNC: 37 MG/DL
LYMPHOCYTES # BLD AUTO: 1.61 K/UL
LYMPHOCYTES NFR BLD AUTO: 24.2 %
MAN DIFF?: NORMAL
MCHC RBC-ENTMCNC: 27.4 PG
MCHC RBC-ENTMCNC: 29.8 GM/DL
MCV RBC AUTO: 91.9 FL
MONOCYTES # BLD AUTO: 0.52 K/UL
MONOCYTES NFR BLD AUTO: 7.8 %
NEUTROPHILS # BLD AUTO: 4.2 K/UL
NEUTROPHILS NFR BLD AUTO: 63.3 %
NONHDLC SERPL-MCNC: 74 MG/DL
PLATELET # BLD AUTO: 540 K/UL
POTASSIUM SERPL-SCNC: 4 MMOL/L
PROT SERPL-MCNC: 6.4 G/DL
RBC # BLD: 3.32 M/UL
RBC # FLD: 17.2 %
SODIUM SERPL-SCNC: 142 MMOL/L
T4 SERPL-MCNC: 7.2 UG/DL
TRIGL SERPL-MCNC: 183 MG/DL
TSH SERPL-ACNC: 3.27 UIU/ML
WBC # FLD AUTO: 6.64 K/UL

## 2022-10-11 ENCOUNTER — APPOINTMENT (OUTPATIENT)
Dept: COLORECTAL SURGERY | Facility: CLINIC | Age: 57
End: 2022-10-11

## 2022-10-11 PROCEDURE — 99024 POSTOP FOLLOW-UP VISIT: CPT

## 2022-10-11 NOTE — HISTORY OF PRESENT ILLNESS
[FreeTextEntry1] : Status post abdominal perineal resection for perforated anus/rectum after radiation therapy for prostate and anal cancer. Patient progressing well. Tolerating diet. Stoma functioning. Draining left thigh wound with decreased output

## 2022-10-11 NOTE — ASSESSMENT
[FreeTextEntry1] : Anal cancer\par -Patient progressing well\par -Continue percutaneous drain per interventional radiology\par -Diet as tolerated\par -Polyp plastic surgery\par -Follow up in 6 weeks for wound check

## 2022-10-13 ENCOUNTER — APPOINTMENT (OUTPATIENT)
Dept: INTERNAL MEDICINE | Facility: CLINIC | Age: 57
End: 2022-10-13

## 2022-10-13 DIAGNOSIS — Z46.82 ENCOUNTER FOR FITTING AND ADJUSTMENT OF NON-VASCULAR CATHETER: ICD-10-CM

## 2022-10-13 PROCEDURE — 36415 COLL VENOUS BLD VENIPUNCTURE: CPT

## 2022-10-15 LAB
CULTURE RESULTS: SIGNIFICANT CHANGE UP
SPECIMEN SOURCE: SIGNIFICANT CHANGE UP

## 2022-10-19 ENCOUNTER — FORM ENCOUNTER (OUTPATIENT)
Age: 57
End: 2022-10-19

## 2022-10-20 ENCOUNTER — OUTPATIENT (OUTPATIENT)
Dept: OUTPATIENT SERVICES | Facility: HOSPITAL | Age: 57
LOS: 1 days | End: 2022-10-20
Payer: MEDICAID

## 2022-10-20 ENCOUNTER — LABORATORY RESULT (OUTPATIENT)
Age: 57
End: 2022-10-20

## 2022-10-20 ENCOUNTER — APPOINTMENT (OUTPATIENT)
Dept: INFECTIOUS DISEASE | Facility: CLINIC | Age: 57
End: 2022-10-20

## 2022-10-20 VITALS
HEIGHT: 71 IN | OXYGEN SATURATION: 98 % | DIASTOLIC BLOOD PRESSURE: 98 MMHG | TEMPERATURE: 97.6 F | WEIGHT: 195 LBS | SYSTOLIC BLOOD PRESSURE: 146 MMHG | BODY MASS INDEX: 27.3 KG/M2 | HEART RATE: 65 BPM

## 2022-10-20 DIAGNOSIS — B97.89 OTHER VIRAL AGENTS AS THE CAUSE OF DISEASES CLASSIFIED ELSEWHERE: ICD-10-CM

## 2022-10-20 DIAGNOSIS — K62.9 DISEASE OF ANUS AND RECTUM, UNSPECIFIED: Chronic | ICD-10-CM

## 2022-10-20 LAB
4/8 RATIO: 0.29 RATIO — LOW (ref 0.9–3.6)
ABS CD8: 1773 /UL — HIGH (ref 142–740)
ALBUMIN SERPL ELPH-MCNC: 4.1 G/DL — SIGNIFICANT CHANGE UP (ref 3.3–5)
ALP SERPL-CCNC: 98 U/L — SIGNIFICANT CHANGE UP (ref 40–120)
ALT FLD-CCNC: 11 U/L — SIGNIFICANT CHANGE UP (ref 10–45)
ANION GAP SERPL CALC-SCNC: 10 MMOL/L — SIGNIFICANT CHANGE UP (ref 5–17)
AST SERPL-CCNC: 15 U/L — SIGNIFICANT CHANGE UP (ref 10–40)
BASOPHILS # BLD AUTO: 0.04 K/UL — SIGNIFICANT CHANGE UP (ref 0–0.2)
BASOPHILS NFR BLD AUTO: 0.5 % — SIGNIFICANT CHANGE UP (ref 0–2)
BILIRUB SERPL-MCNC: 0.2 MG/DL — SIGNIFICANT CHANGE UP (ref 0.2–1.2)
BUN SERPL-MCNC: 16 MG/DL — SIGNIFICANT CHANGE UP (ref 7–23)
CALCIUM SERPL-MCNC: 10.4 MG/DL — SIGNIFICANT CHANGE UP (ref 8.4–10.5)
CD3 BLASTS SPEC-ACNC: 2286 /UL — HIGH (ref 672–1870)
CD3 BLASTS SPEC-ACNC: 92 % — HIGH (ref 59–83)
CD4 %: 21 % — LOW (ref 30–62)
CD8 %: 71 % — HIGH (ref 12–36)
CHLORIDE SERPL-SCNC: 103 MMOL/L — SIGNIFICANT CHANGE UP (ref 96–108)
CO2 SERPL-SCNC: 31 MMOL/L — SIGNIFICANT CHANGE UP (ref 22–31)
CREAT SERPL-MCNC: 0.98 MG/DL — SIGNIFICANT CHANGE UP (ref 0.5–1.3)
EGFR: 90 ML/MIN/1.73M2 — SIGNIFICANT CHANGE UP
EOSINOPHIL # BLD AUTO: 0.01 K/UL — SIGNIFICANT CHANGE UP (ref 0–0.5)
EOSINOPHIL NFR BLD AUTO: 0.1 % — SIGNIFICANT CHANGE UP (ref 0–6)
GLUCOSE SERPL-MCNC: 109 MG/DL — HIGH (ref 70–99)
HCT VFR BLD CALC: 41.8 % — SIGNIFICANT CHANGE UP (ref 39–50)
HGB BLD-MCNC: 13 G/DL — SIGNIFICANT CHANGE UP (ref 13–17)
IMM GRANULOCYTES NFR BLD AUTO: 0.2 % — SIGNIFICANT CHANGE UP (ref 0–0.9)
LYMPHOCYTES # BLD AUTO: 2.84 K/UL — SIGNIFICANT CHANGE UP (ref 1–3.3)
LYMPHOCYTES # BLD AUTO: 35.1 % — SIGNIFICANT CHANGE UP (ref 13–44)
MCHC RBC-ENTMCNC: 28.1 PG — SIGNIFICANT CHANGE UP (ref 27–34)
MCHC RBC-ENTMCNC: 31.1 GM/DL — LOW (ref 32–36)
MCV RBC AUTO: 90.5 FL — SIGNIFICANT CHANGE UP (ref 80–100)
MONOCYTES # BLD AUTO: 1.04 K/UL — HIGH (ref 0–0.9)
MONOCYTES NFR BLD AUTO: 12.8 % — SIGNIFICANT CHANGE UP (ref 2–14)
NEUTROPHILS # BLD AUTO: 4.15 K/UL — SIGNIFICANT CHANGE UP (ref 1.8–7.4)
NEUTROPHILS NFR BLD AUTO: 51.3 % — SIGNIFICANT CHANGE UP (ref 43–77)
PLATELET # BLD AUTO: 394 K/UL — SIGNIFICANT CHANGE UP (ref 150–400)
POTASSIUM SERPL-MCNC: 4.6 MMOL/L — SIGNIFICANT CHANGE UP (ref 3.5–5.3)
POTASSIUM SERPL-SCNC: 4.6 MMOL/L — SIGNIFICANT CHANGE UP (ref 3.5–5.3)
PROT SERPL-MCNC: 7.6 G/DL — SIGNIFICANT CHANGE UP (ref 6–8.3)
RBC # BLD: 4.62 M/UL — SIGNIFICANT CHANGE UP (ref 4.2–5.8)
RBC # FLD: 17.5 % — HIGH (ref 10.3–14.5)
SODIUM SERPL-SCNC: 144 MMOL/L — SIGNIFICANT CHANGE UP (ref 135–145)
T-CELL CD4 SUBSET PNL BLD: 511 /UL — SIGNIFICANT CHANGE UP (ref 489–1457)
WBC # BLD: 8.1 K/UL — SIGNIFICANT CHANGE UP (ref 3.8–10.5)
WBC # FLD AUTO: 8.1 K/UL — SIGNIFICANT CHANGE UP (ref 3.8–10.5)

## 2022-10-20 PROCEDURE — 36415 COLL VENOUS BLD VENIPUNCTURE: CPT

## 2022-10-20 PROCEDURE — 87536 HIV-1 QUANT&REVRSE TRNSCRPJ: CPT

## 2022-10-20 PROCEDURE — ZZZZZ: CPT

## 2022-10-20 PROCEDURE — 86359 T CELLS TOTAL COUNT: CPT

## 2022-10-20 PROCEDURE — 86360 T CELL ABSOLUTE COUNT/RATIO: CPT

## 2022-10-20 PROCEDURE — 80053 COMPREHEN METABOLIC PANEL: CPT

## 2022-10-20 PROCEDURE — 90834 PSYTX W PT 45 MINUTES: CPT

## 2022-10-20 PROCEDURE — G0463: CPT

## 2022-10-20 PROCEDURE — 99215 OFFICE O/P EST HI 40 MIN: CPT

## 2022-10-20 PROCEDURE — 85025 COMPLETE CBC W/AUTO DIFF WBC: CPT

## 2022-10-20 NOTE — ASSESSMENT
[FreeTextEntry1] : 57 m with HTN, HIV diagnosed 1992 on Biktarvy, last VL:UD and CD4>900, prostate ca 2016 s/p radiation, anal cancer (dx 4/2021) s/p radiation and chemotherapy (last 8/2021) complicated by an ulcer, abscess and rectal bleeding\par s/p loop colostomy 3/30/22 and then s/p APR with end colostomy creation, VRAM flap closure, 8/24 found to have a large abscess as well, abscess cx with clostridium, bacteroides \par again was admitted for GIB and pelvic/groin abscess s/p drain placement and CT also showed L ischial tuberosity osteo so pt received zosyn in the hospital and was discharged with PO levo and flagyl to finish a 6 week course\par biktarvy was held during the hospitalization for some time in view of OR and GIB but now compliant no fever, still has a drain plan for IR tube check tomorrow\par \par \par \par HIV on Biktarvy, VL<30, CD4 400\par c/w biktarvy and repeat VL, CD4\par \par anal Ca s/p chemo and RT with rectal ulcer, abscess and bleed s/p loop colostomy 3/30/22 and then s/p APR with end colostomy creation, VRAM flap closure, 8/24 found to have a large abscess as well, abscess cx with clostridium, bacteroides \par again was admitted for GIB and pelvic/groin abscess s/p drain placement and CT also showed L ischial tuberosity osteo so pt received zosyn in the hospital and was discharged with PO levo and flagyl to finish a 6 week course\par biktarvy was held during the hospitalization for some time in view of OR and GIB but now compliant no fever, still has a drain plan for IR tube check tomorrow\par \par \par HTN, will refer to medicine\par \par HCM: \par s/p flu and COVID vaccine\par s/p prevnar 2022\par

## 2022-10-20 NOTE — HISTORY OF PRESENT ILLNESS
[FreeTextEntry1] : 57 m with HTN, HIV diagnosed 1992 on Biktarvy, last VL:UD and CD4>900, prostate ca 2016 s/p radiation, anal cancer (dx 4/2021) s/p radiation and chemotherapy (last 8/2021) complicated by an ulcer, abscess and rectal bleeding\par s/p loop colostomy 3/30/22 and then s/p APR with end colostomy creation, VRAM flap closure, 8/24 found to have a large abscess as well, abscess cx with clostridium, bacteroides \par again was admitted for GIB and pelvic/groin abscess s/p drain placement and CT also showed L ischial tuberosity osteo so pt received zosyn in the hospital and was discharged with PO levo and flagyl to finish a 6 week course\par biktarvy was held during the hospitalization for some time in view of OR and GIB but now compliant no fever, still has a drain plan for IR tube check tomorrow\par \par

## 2022-10-20 NOTE — REVIEW OF SYSTEMS
[Fever] : no fever [Chills] : no chills [Eye Pain] : no eye pain [Red Eyes] : eyes not red [Earache] : no earache [Loss Of Hearing] : no hearing loss [Chest Pain] : no chest pain [Palpitations] : no palpitations [Shortness Of Breath] : no shortness of breath [Wheezing] : no wheezing [Abdominal Pain] : no abdominal pain [Vomiting] : no vomiting [Dysuria] : no dysuria [Joint Swelling] : no joint swelling [Skin Lesions] : no skin lesions [Confused] : no confusion [Convulsions] : no convulsions [Suicidal] : not suicidal [Easy Bleeding] : no tendency for easy bleeding [Easy Bruising] : no tendency for easy bruising [Negative] : Heme/Lymph [FreeTextEntry7] : some pain at the incision site [FreeTextEntry8] : weak stream

## 2022-10-20 NOTE — PHYSICAL EXAM
[General Appearance - Alert] : alert [General Appearance - In No Acute Distress] : in no acute distress [Sclera] : the sclera and conjunctiva were normal [Outer Ear] : the ears and nose were normal in appearance [Neck Appearance] : the appearance of the neck was normal [] : no respiratory distress [Auscultation Breath Sounds / Voice Sounds] : lungs were clear to auscultation bilaterally [Heart Sounds] : normal S1 and S2 [Edema] : there was no peripheral edema [Bowel Sounds] : normal bowel sounds [Abdomen Soft] : soft [Costovertebral Angle Tenderness] : no CVA tenderness [FreeTextEntry1] : ostomy with brown stool, midline incision well healed, drain in the thigh [No Palpable Adenopathy] : no palpable adenopathy [Musculoskeletal - Swelling] : no joint swelling [No Focal Deficits] : no focal deficits [Affect] : the affect was normal

## 2022-10-21 ENCOUNTER — RESULT REVIEW (OUTPATIENT)
Age: 57
End: 2022-10-21

## 2022-10-21 ENCOUNTER — OUTPATIENT (OUTPATIENT)
Dept: OUTPATIENT SERVICES | Facility: HOSPITAL | Age: 57
LOS: 1 days | End: 2022-10-21

## 2022-10-21 VITALS
HEART RATE: 117 BPM | OXYGEN SATURATION: 98 % | DIASTOLIC BLOOD PRESSURE: 96 MMHG | RESPIRATION RATE: 20 BRPM | SYSTOLIC BLOOD PRESSURE: 139 MMHG | TEMPERATURE: 98 F

## 2022-10-21 VITALS
RESPIRATION RATE: 20 BRPM | DIASTOLIC BLOOD PRESSURE: 104 MMHG | HEART RATE: 100 BPM | SYSTOLIC BLOOD PRESSURE: 131 MMHG | OXYGEN SATURATION: 98 %

## 2022-10-21 DIAGNOSIS — K62.9 DISEASE OF ANUS AND RECTUM, UNSPECIFIED: Chronic | ICD-10-CM

## 2022-10-21 DIAGNOSIS — C20 MALIGNANT NEOPLASM OF RECTUM: ICD-10-CM

## 2022-10-21 LAB
HIV-1 VIRAL LOAD RESULT: SIGNIFICANT CHANGE UP
HIV1 RNA # SERPL NAA+PROBE: SIGNIFICANT CHANGE UP COPIES/ML
HIV1 RNA SER-IMP: SIGNIFICANT CHANGE UP
HIV1 RNA SERPL NAA+PROBE-ACNC: SIGNIFICANT CHANGE UP
HIV1 RNA SERPL NAA+PROBE-LOG#: SIGNIFICANT CHANGE UP LG COP/ML

## 2022-10-21 PROCEDURE — 49424 ASSESS CYST CONTRAST INJECT: CPT

## 2022-10-21 PROCEDURE — 76080 X-RAY EXAM OF FISTULA: CPT | Mod: 26

## 2022-10-24 LAB
BASOPHILS # BLD AUTO: 0.03 K/UL
BASOPHILS NFR BLD AUTO: 0.3 %
EOSINOPHIL # BLD AUTO: 0.02 K/UL
EOSINOPHIL NFR BLD AUTO: 0.2 %
FOLATE SERPL-MCNC: 13 NG/ML
HCT VFR BLD CALC: 41.1 %
HGB BLD-MCNC: 12.2 G/DL
IMM GRANULOCYTES NFR BLD AUTO: 0.3 %
LYMPHOCYTES # BLD AUTO: 2.95 K/UL
LYMPHOCYTES NFR BLD AUTO: 29.4 %
MAN DIFF?: NORMAL
MCHC RBC-ENTMCNC: 28.6 PG
MCHC RBC-ENTMCNC: 29.7 GM/DL
MCV RBC AUTO: 96.5 FL
MONOCYTES # BLD AUTO: 1.15 K/UL
MONOCYTES NFR BLD AUTO: 11.5 %
NEUTROPHILS # BLD AUTO: 5.86 K/UL
NEUTROPHILS NFR BLD AUTO: 58.3 %
PLATELET # BLD AUTO: 372 K/UL
RBC # BLD: 4.26 M/UL
RBC # FLD: 18.6 %
VIT B12 SERPL-MCNC: 355 PG/ML
WBC # FLD AUTO: 10.04 K/UL

## 2022-10-26 DIAGNOSIS — T81.49XA INFECTION FOLLOWING A PROCEDURE, OTHER SURGICAL SITE, INITIAL ENCOUNTER: ICD-10-CM

## 2022-10-26 DIAGNOSIS — Z46.82 ENCOUNTER FOR FITTING AND ADJUSTMENT OF NON-VASCULAR CATHETER: ICD-10-CM

## 2022-10-28 DIAGNOSIS — B20 HUMAN IMMUNODEFICIENCY VIRUS [HIV] DISEASE: ICD-10-CM

## 2022-11-02 ENCOUNTER — APPOINTMENT (OUTPATIENT)
Dept: CARDIOLOGY | Facility: CLINIC | Age: 57
End: 2022-11-02

## 2022-11-02 VITALS
TEMPERATURE: 97.6 F | OXYGEN SATURATION: 98 % | WEIGHT: 193 LBS | SYSTOLIC BLOOD PRESSURE: 123 MMHG | HEART RATE: 106 BPM | DIASTOLIC BLOOD PRESSURE: 85 MMHG | BODY MASS INDEX: 27.02 KG/M2 | HEIGHT: 71 IN

## 2022-11-02 PROCEDURE — 99215 OFFICE O/P EST HI 40 MIN: CPT

## 2022-11-02 NOTE — HISTORY OF PRESENT ILLNESS
[FreeTextEntry1] : CARL ROWLEY 57 year M HIV positive, anal ca, prostate ca treated with XRT and chemo though no anthracyclines. No prior cardiac hx. No DM but prior longstanding smoker now abated. Denies exertional chest pain. Mild  dyspnea, (NYHA class 2-3) likely pulmonary origin; no chest pain; No palpitations; No syncope/presyncope; no claudication No peripheral edema. Lengthy discussion regarding pharmacologic stress nuclear testing. Not currently symptomatic. EKG NSR 75 BPM SHYANN RVH. Of greater concern is his wheezing and potentially adverse pulmonary status and outcomes. RCRI score is 1-2 (associated with moderate periop MACE risk) and there are no prohibitive cardiac contraindications to contemplated colostomy revision. Preop pulmonary/anesthesia evaluation may be helpful. Echo now agreeable. Colostomy revision uncomplicated. Amlodipine, atorvastatin ASA. No interval complaints. Tolerating medications well. Total visit time 45min.\par

## 2022-11-10 ENCOUNTER — APPOINTMENT (OUTPATIENT)
Dept: PLASTIC SURGERY | Facility: CLINIC | Age: 57
End: 2022-11-10

## 2022-11-10 VITALS
OXYGEN SATURATION: 99 % | HEART RATE: 97 BPM | BODY MASS INDEX: 27.3 KG/M2 | DIASTOLIC BLOOD PRESSURE: 119 MMHG | TEMPERATURE: 97.2 F | SYSTOLIC BLOOD PRESSURE: 145 MMHG | HEIGHT: 71 IN | WEIGHT: 195 LBS

## 2022-11-10 PROCEDURE — 99024 POSTOP FOLLOW-UP VISIT: CPT

## 2022-11-11 ENCOUNTER — INPATIENT (INPATIENT)
Facility: HOSPITAL | Age: 57
LOS: 9 days | Discharge: HOME HEALTH | End: 2022-11-21
Attending: HOSPITALIST | Admitting: HOSPITALIST

## 2022-11-11 ENCOUNTER — OUTPATIENT (OUTPATIENT)
Dept: OUTPATIENT SERVICES | Facility: HOSPITAL | Age: 57
LOS: 1 days | End: 2022-11-11

## 2022-11-11 ENCOUNTER — RESULT REVIEW (OUTPATIENT)
Age: 57
End: 2022-11-11

## 2022-11-11 VITALS
RESPIRATION RATE: 18 BRPM | TEMPERATURE: 98 F | OXYGEN SATURATION: 99 % | SYSTOLIC BLOOD PRESSURE: 134 MMHG | DIASTOLIC BLOOD PRESSURE: 95 MMHG | HEART RATE: 97 BPM

## 2022-11-11 VITALS
TEMPERATURE: 98 F | OXYGEN SATURATION: 97 % | RESPIRATION RATE: 18 BRPM | HEART RATE: 90 BPM | DIASTOLIC BLOOD PRESSURE: 94 MMHG | SYSTOLIC BLOOD PRESSURE: 142 MMHG

## 2022-11-11 VITALS
RESPIRATION RATE: 16 BRPM | HEART RATE: 94 BPM | OXYGEN SATURATION: 99 % | TEMPERATURE: 99 F | HEIGHT: 71 IN | SYSTOLIC BLOOD PRESSURE: 148 MMHG | DIASTOLIC BLOOD PRESSURE: 103 MMHG

## 2022-11-11 DIAGNOSIS — N30.40 IRRADIATION CYSTITIS WITHOUT HEMATURIA: ICD-10-CM

## 2022-11-11 DIAGNOSIS — K62.9 DISEASE OF ANUS AND RECTUM, UNSPECIFIED: Chronic | ICD-10-CM

## 2022-11-11 DIAGNOSIS — N39.0 URINARY TRACT INFECTION, SITE NOT SPECIFIED: ICD-10-CM

## 2022-11-11 LAB
ALBUMIN SERPL ELPH-MCNC: 4.2 G/DL — SIGNIFICANT CHANGE UP (ref 3.3–5)
ALP SERPL-CCNC: 105 U/L — SIGNIFICANT CHANGE UP (ref 40–120)
ALT FLD-CCNC: 9 U/L — SIGNIFICANT CHANGE UP (ref 4–41)
ANION GAP SERPL CALC-SCNC: 11 MMOL/L — SIGNIFICANT CHANGE UP (ref 7–14)
APPEARANCE UR: ABNORMAL
AST SERPL-CCNC: 14 U/L — SIGNIFICANT CHANGE UP (ref 4–40)
BACTERIA # UR AUTO: NEGATIVE — SIGNIFICANT CHANGE UP
BASOPHILS # BLD AUTO: 0.05 K/UL — SIGNIFICANT CHANGE UP (ref 0–0.2)
BASOPHILS NFR BLD AUTO: 0.6 % — SIGNIFICANT CHANGE UP (ref 0–2)
BILIRUB SERPL-MCNC: <0.2 MG/DL — SIGNIFICANT CHANGE UP (ref 0.2–1.2)
BILIRUB UR-MCNC: NEGATIVE — SIGNIFICANT CHANGE UP
BUN SERPL-MCNC: 19 MG/DL — SIGNIFICANT CHANGE UP (ref 7–23)
CALCIUM SERPL-MCNC: 10.2 MG/DL — SIGNIFICANT CHANGE UP (ref 8.4–10.5)
CHLORIDE SERPL-SCNC: 100 MMOL/L — SIGNIFICANT CHANGE UP (ref 98–107)
CO2 SERPL-SCNC: 28 MMOL/L — SIGNIFICANT CHANGE UP (ref 22–31)
COLOR SPEC: ABNORMAL
CREAT SERPL-MCNC: 1.38 MG/DL — HIGH (ref 0.5–1.3)
DIFF PNL FLD: ABNORMAL
EGFR: 60 ML/MIN/1.73M2 — SIGNIFICANT CHANGE UP
EOSINOPHIL # BLD AUTO: 0.02 K/UL — SIGNIFICANT CHANGE UP (ref 0–0.5)
EOSINOPHIL NFR BLD AUTO: 0.2 % — SIGNIFICANT CHANGE UP (ref 0–6)
EPI CELLS # UR: 5 /HPF — SIGNIFICANT CHANGE UP (ref 0–5)
GLUCOSE SERPL-MCNC: 99 MG/DL — SIGNIFICANT CHANGE UP (ref 70–99)
GLUCOSE UR QL: NEGATIVE — SIGNIFICANT CHANGE UP
HCT VFR BLD CALC: 40.3 % — SIGNIFICANT CHANGE UP (ref 39–50)
HGB BLD-MCNC: 12.9 G/DL — LOW (ref 13–17)
HYALINE CASTS # UR AUTO: ABNORMAL (ref 0–7)
IANC: 4.82 K/UL — SIGNIFICANT CHANGE UP (ref 1.8–7.4)
IMM GRANULOCYTES NFR BLD AUTO: 0.4 % — SIGNIFICANT CHANGE UP (ref 0–0.9)
KETONES UR-MCNC: NEGATIVE — SIGNIFICANT CHANGE UP
LEUKOCYTE ESTERASE UR-ACNC: ABNORMAL
LYMPHOCYTES # BLD AUTO: 2.45 K/UL — SIGNIFICANT CHANGE UP (ref 1–3.3)
LYMPHOCYTES # BLD AUTO: 29.7 % — SIGNIFICANT CHANGE UP (ref 13–44)
MCHC RBC-ENTMCNC: 28 PG — SIGNIFICANT CHANGE UP (ref 27–34)
MCHC RBC-ENTMCNC: 32 GM/DL — SIGNIFICANT CHANGE UP (ref 32–36)
MCV RBC AUTO: 87.6 FL — SIGNIFICANT CHANGE UP (ref 80–100)
MONOCYTES # BLD AUTO: 0.89 K/UL — SIGNIFICANT CHANGE UP (ref 0–0.9)
MONOCYTES NFR BLD AUTO: 10.8 % — SIGNIFICANT CHANGE UP (ref 2–14)
NEUTROPHILS # BLD AUTO: 4.82 K/UL — SIGNIFICANT CHANGE UP (ref 1.8–7.4)
NEUTROPHILS NFR BLD AUTO: 58.3 % — SIGNIFICANT CHANGE UP (ref 43–77)
NITRITE UR-MCNC: NEGATIVE — SIGNIFICANT CHANGE UP
NRBC # BLD: 0 /100 WBCS — SIGNIFICANT CHANGE UP (ref 0–0)
NRBC # FLD: 0 K/UL — SIGNIFICANT CHANGE UP (ref 0–0)
PH UR: 6.5 — SIGNIFICANT CHANGE UP (ref 5–8)
PLATELET # BLD AUTO: 360 K/UL — SIGNIFICANT CHANGE UP (ref 150–400)
POTASSIUM SERPL-MCNC: 3.9 MMOL/L — SIGNIFICANT CHANGE UP (ref 3.5–5.3)
POTASSIUM SERPL-SCNC: 3.9 MMOL/L — SIGNIFICANT CHANGE UP (ref 3.5–5.3)
PROT SERPL-MCNC: 8.9 G/DL — HIGH (ref 6–8.3)
PROT UR-MCNC: ABNORMAL
RBC # BLD: 4.6 M/UL — SIGNIFICANT CHANGE UP (ref 4.2–5.8)
RBC # FLD: 15.4 % — HIGH (ref 10.3–14.5)
RBC CASTS # UR COMP ASSIST: >720 /HPF — HIGH (ref 0–4)
SODIUM SERPL-SCNC: 139 MMOL/L — SIGNIFICANT CHANGE UP (ref 135–145)
SP GR SPEC: 1.01 — SIGNIFICANT CHANGE UP (ref 1.01–1.05)
UROBILINOGEN FLD QL: SIGNIFICANT CHANGE UP
WBC # BLD: 8.26 K/UL — SIGNIFICANT CHANGE UP (ref 3.8–10.5)
WBC # FLD AUTO: 8.26 K/UL — SIGNIFICANT CHANGE UP (ref 3.8–10.5)
WBC UR QL: 13 /HPF — HIGH (ref 0–5)

## 2022-11-11 PROCEDURE — 49423 EXCHANGE DRAINAGE CATHETER: CPT

## 2022-11-11 PROCEDURE — 99285 EMERGENCY DEPT VISIT HI MDM: CPT

## 2022-11-11 PROCEDURE — 75984 XRAY CONTROL CATHETER CHANGE: CPT | Mod: 26

## 2022-11-11 PROCEDURE — 74177 CT ABD & PELVIS W/CONTRAST: CPT | Mod: 26,MA

## 2022-11-11 RX ORDER — SODIUM CHLORIDE 9 MG/ML
1000 INJECTION INTRAMUSCULAR; INTRAVENOUS; SUBCUTANEOUS ONCE
Refills: 0 | Status: COMPLETED | OUTPATIENT
Start: 2022-11-11 | End: 2022-11-11

## 2022-11-11 RX ORDER — CEFTRIAXONE 500 MG/1
1000 INJECTION, POWDER, FOR SOLUTION INTRAMUSCULAR; INTRAVENOUS ONCE
Refills: 0 | Status: COMPLETED | OUTPATIENT
Start: 2022-11-11 | End: 2022-11-11

## 2022-11-11 RX ORDER — CEFPODOXIME PROXETIL 100 MG
1 TABLET ORAL
Qty: 20 | Refills: 0
Start: 2022-11-11 | End: 2022-11-20

## 2022-11-11 RX ADMIN — SODIUM CHLORIDE 1000 MILLILITER(S): 9 INJECTION INTRAMUSCULAR; INTRAVENOUS; SUBCUTANEOUS at 14:17

## 2022-11-11 RX ADMIN — CEFTRIAXONE 100 MILLIGRAM(S): 500 INJECTION, POWDER, FOR SOLUTION INTRAMUSCULAR; INTRAVENOUS at 15:18

## 2022-11-11 NOTE — ED PROVIDER NOTE - ATTENDING CONTRIBUTION TO CARE
57y Male with Hx tachycardia, HIV, DMII, rectal/prostate cancer s/p radiation and colostomy, lower GIB, now s/p APR with end colostomy creation, VRAM flap closure here for hematuria and dysuria. Patient is well-appearing, states he has had intermittent nausea vomiting over the last few weeks, currently does not feel nauseous and denies any abdominal pain or back pain.  UA showing signs of UTI and given the patient is symptomatic we will treat empirically.  No evidence of sepsis or concern to admit patient to hospital for further management.  Commend follow-up with PMD for repeat in the next week.  Return precautions to ED discussed with patient.

## 2022-11-11 NOTE — ED PROVIDER NOTE - OBJECTIVE STATEMENT
57y Male with Hx tachycardia, HIV, DMII, rectal/prostate cancer s/p radiation and colostomy, lower GIB, now s/p APR with end colostomy creation, VRAM flap closure here for hematuria. Pt stating since dc from hospital last month, having pain after urination, and intermittent pink urine. Pt otherwise denies cp, sob, abdominal pain, or issues with bowel.

## 2022-11-11 NOTE — ED PROVIDER NOTE - PHYSICAL EXAMINATION
General: NAD  HEENT: NCAT  Cardiac: RRR, 2+ radial pulses  Chest: CTA  Abdomen: soft, non-distended, no ttp, no rebound or guarding, +ostomy, pink and well perfused, -cva ttp  : +normal   Extremities: no peripheral edema, calf tenderness, or leg size discrepancies  Skin: no rashes  Neuro: AAOx3, motor and sensory grossly intact  Psych: mood and affect appropriate

## 2022-11-11 NOTE — ED PROVIDER NOTE - PROGRESS NOTE DETAILS
DEANNA Merchant (PGY-3) - pt w/ mild uti on ua. Will treat. d/w pt his elevated creatinine. He will follow up with his doctor. Marlo BARGER: Received signout from my colleague Dr. Adams Patient is a 57-year-old gentleman with a history of HIV diabetes prostate / colon ca, complicated surgical hx, Left lower extremity. Fluid collection that followed by IR.  Patient presents with hematuria today.  Just prior to discharge patient reported had a second episode again of hematuria and wife at bedside notes drain from left lower extremity area of been putting out pink discharge for the last day as well.  Was seen by IR earlier today and had drain advanced. Patient has not had hematuria before.  Unclear if new episodes are connected.  Consider possible fistula. Will get CT.

## 2022-11-11 NOTE — CONSULT NOTE PEDS - SUBJECTIVE AND OBJECTIVE BOX
HPI:  56 y/o male with a medical history of HIV, DMII, prostate cancer s/p radiation ~7 years ago, currently on Lupron (followed by outpatient Urologist Manuel Mathews), rectal ca s/p colostomy, lower GIB, now s/p APR with end colostomy creation, VRAM flap closure on , s/p left thigh/pelvis abscess drain placement on  via IR, presenting with gross hematuria x 1 week.  Reports urine started off as blood tinged and pink in color and has darkened to a punch color with occasional clots.  Denies prior h/o hematuria.  Denies abdominal pain or flank pain.  Denies dysuria, increase in urgency/frequency or retention.  Denies fevers.      PAST MEDICAL & SURGICAL HISTORY:   HTN (hypertension)    HLD (hyperlipidemia)    HIV infection    Depressive disorder    Anxiety    Prostate cancer    Anal cancer    Diverticulosis    Anal lesion    MEDICATIONS:  Home Medications:  ALPRAZolam 0.5 mg oral tablet: 1 tab(s) orally 3 times a day, As Needed (24 Aug 2022 12:58)  ALPRAZolam 1 mg oral tablet: 1 tab(s) orally once a day, As needed, anxiety (24 Aug 2022 12:59)  amLODIPine 5 mg oral tablet: 1 tab(s) orally once a day (24 Aug 2022 12:59)  aspirin 81 mg oral tablet: orally once a day (24 Aug 2022 12:59)  atorvastatin 10 mg oral tablet: 1 tab(s) orally once a day (24 Aug 2022 12:59)  bictegravir/emtricitabine/tenofovir 50 mg-200 mg-25 mg oral tablet: 1 tab(s) orally once a day (24 Aug 2022 12:59)  Biktarvy 30 mg-120 mg-15 mg oral tablet: 1 tab(s) orally once a day (24 Aug 2022 12:59)  OLANZapine 10 mg oral tablet: 1 tab(s) orally once a day (at bedtime) (24 Aug 2022 12:59)  OLANZapine 5 mg oral tablet: 1 tab(s) orally once a day (24 Aug 2022 12:59)  polyethylene glycol 3350 oral powder for reconstitution: 17 gram(s) orally 2 times a day (24 Aug 2022 12:59)  senna oral tablet: 2 tab(s) orally once a day (at bedtime) (24 Aug 2022 12:59)  zolpidem 10 mg oral tablet: 1 tab(s) orally once a day (at bedtime) (24 Aug 2022 12:59)    FAMILY HISTORY:  h/o prostate ca in uncles x2    Allergies  NKDA    SOCIAL HISTORY:  Denies toxic habits    REVIEW OF SYSTEMS: Otherwise negative as stated in HPI    Vital Signs Last 24 Hrs  T(C): 36.8 (2022 19:12), Max: 37.1 (2022 12:59)  T(F): 98.3 (2022 19:12), Max: 98.7 (2022 12:59)  HR: 81 (2022 19:12) (81 - 104)  BP: 138/88 (2022 19:12) (134/95 - 149/86)  BP(mean): --  RR: 17 (2022 19:12) (16 - 18)  SpO2: 100% (2022 19:12) (97% - 100%)    Parameters below as of 2022 19:12  Patient On (Oxygen Delivery Method): room air    PHYSICAL EXAM:  General: Awake and Alert in no acute distress    Respiratory and Thorax: no resp distress   	  Cardiovascular: regular     Gastrointestinal: soft, non tender, no CVAT, well healed midline scar.     Genitourinary: able to void spontaneously, ~200cc of translucent punch colored urine, minimal small clots present.  PVR obtained 50-75cc    LABS:                        12.9   8.26  )-----------( 360      ( 2022 14:28 )             40.3     11-11    139  |  100  |  19  ----------------------------<  99  3.9   |  28  |  1.38<H>    Ca    10.2      2022 14:28    TPro  8.9<H>  /  Alb  4.2  /  TBili  <0.2  /  DBili  x   /  AST  14  /  ALT  9   /  AlkPhos  105  11-11    Urinalysis Basic - ( 2022 14:28 )  Color: Brown / Appearance: Slightly Turbid / S.015 / pH: x  Gluc: x / Ketone: Negative  / Bili: Negative / Urobili: <2 mg/dL   Blood: x / Protein: 100 mg/dL / Nitrite: Negative   Leuk Esterase: Small / RBC: >720 /HPF / WBC 13 /HPF   Sq Epi: x / Non Sq Epi: 5 /HPF / Bacteria: Negative    RADIOLOGY:  < from: CT Abdomen and Pelvis w/ IV Cont (22 @ 19:05) >  IMPRESSION:  1.  Status post APR with vertical rectus abdominis musculocutaneous flap   reconstruction. Previous pelvic presacral collection is collapsed around   the left transgluteal pigtail drain. No definite fistulization is seen.    2.  Bladder wall thickening, bilateral urothelial enhancement and   increased perinephric inflammation, suggestive of urinary tract   infection/pyelonephritis. The progressed inflammatory changes surrounding   the bilateral mid ureters causing worsening moderate bilateral   hydroureteronephrosis.    --- End of Report ---    CAREY COLÓN MD; Attending Radiologist  This document has been electronicallysigned. 2022  7:38PM    < end of copied text >

## 2022-11-11 NOTE — ED PROVIDER NOTE - NSFOLLOWUPINSTRUCTIONS_ED_ALL_ED_FT
Urinary Tract Infection, Adult       A urinary tract infection (UTI) is an infection of any part of the urinary tract. The urinary tract includes the kidneys, ureters, bladder, and urethra. These organs make, store, and get rid of urine in the body.    An upper UTI affects the ureters and kidneys. A lower UTI affects the bladder and urethra.      What are the causes?    Most urinary tract infections are caused by bacteria in your genital area around your urethra, where urine leaves your body. These bacteria grow and cause inflammation of your urinary tract.      What increases the risk?    You are more likely to develop this condition if:  •You have a urinary catheter that stays in place.      •You are not able to control when you urinate or have a bowel movement (incontinence).    •You are female and you:  •Use a spermicide or diaphragm for birth control.      •Have low estrogen levels.      •Are pregnant.        •You have certain genes that increase your risk.      •You are sexually active.      •You take antibiotic medicines.    •You have a condition that causes your flow of urine to slow down, such as:  •An enlarged prostate, if you are male.      •Blockage in your urethra.      •A kidney stone.      •A nerve condition that affects your bladder control (neurogenic bladder).      •Not getting enough to drink, or not urinating often.      •You have certain medical conditions, such as:  •Diabetes.      •A weak disease-fighting system (immunesystem).      •Sickle cell disease.      •Gout.      •Spinal cord injury.          What are the signs or symptoms?    Symptoms of this condition include:  •Needing to urinate right away (urgency).      •Frequent urination. This may include small amounts of urine each time you urinate.      •Pain or burning with urination.      •Blood in the urine.      •Urine that smells bad or unusual.      •Trouble urinating.      •Cloudy urine.      •Vaginal discharge, if you are female.      •Pain in the abdomen or the lower back.      You may also have:  •Vomiting or a decreased appetite.      •Confusion.      •Irritability or tiredness.      •A fever or chills.      •Diarrhea.      The first symptom in older adults may be confusion. In some cases, they may not have any symptoms until the infection has worsened.      How is this diagnosed?    This condition is diagnosed based on your medical history and a physical exam. You may also have other tests, including:  •Urine tests.      •Blood tests.      •Tests for STIs (sexually transmitted infections).      If you have had more than one UTI, a cystoscopy or imaging studies may be done to determine the cause of the infections.      How is this treated?    Treatment for this condition includes:  •Antibiotic medicine.      •Over-the-counter medicines to treat discomfort.      •Drinking enough water to stay hydrated.      If you have frequent infections or have other conditions such as a kidney stone, you may need to see a health care provider who specializes in the urinary tract (urologist).    In rare cases, urinary tract infections can cause sepsis. Sepsis is a life-threatening condition that occurs when the body responds to an infection. Sepsis is treated in the hospital with IV antibiotics, fluids, and other medicines.      Follow these instructions at home:     Medicines     •Take over-the-counter and prescription medicines only as told by your health care provider.      •If you were prescribed an antibiotic medicine, take it as told by your health care provider. Do not stop using the antibiotic even if you start to feel better.      General instructions   •Make sure you:  •Empty your bladder often and completely. Do not hold urine for long periods of time.      •Empty your bladder after sex.      •Wipe from front to back after urinating or having a bowel movement if you are female. Use each tissue only one time when you wipe.        •Drink enough fluid to keep your urine pale yellow.      •Keep all follow-up visits. This is important.        Contact a health care provider if:    •Your symptoms do not get better after 1–2 days.      •Your symptoms go away and then return.        Get help right away if:    •You have severe pain in your back or your lower abdomen.      •You have a fever or chills.      •You have nausea or vomiting.        Summary    •A urinary tract infection (UTI) is an infection of any part of the urinary tract, which includes the kidneys, ureters, bladder, and urethra.      •Most urinary tract infections are caused by bacteria in your genital area.      •Treatment for this condition often includes antibiotic medicines.      •If you were prescribed an antibiotic medicine, take it as told by your health care provider. Do not stop using the antibiotic even if you start to feel better.      •Keep all follow-up visits. This is important.      This information is not intended to replace advice given to you by your health care provider. Make sure you discuss any questions you have with your health care provider. Your urine shows that you have a likely urinary tract infection and you are prescribed antibiotics.  Please follow-up with your primary care doctor in the next few days.  In addition your blood test showed that your creatinine level which is a marker of your kidney function is a little bit elevated from your baseline.  This requires follow-up with your primary care doctor next week. See attached results to bring to your appointment.     If you develop a fever, vomiting, pain with urination, abdominal pain or back pain please return to the emergency department immediately.

## 2022-11-11 NOTE — ED ADULT NURSE NOTE - OBJECTIVE STATEMENT
Patient presents to ED from home for hematuria and dysuria for past 2 weeks. As per patient, 1 month ago, he had rectal cancer resection surgery. TALI drain in place draining clear reddish fluid s/p surgery. Colostomy with bag right abdomen. He is AA&Ox4, in no acute distress. Respirations equal, unlabored. Denies acute pain, CP, SOB, N/V. Hx. diverticulosis, anal cancer, HIV infection, HTN, HLD.

## 2022-11-11 NOTE — ED PROVIDER NOTE - CLINICAL SUMMARY MEDICAL DECISION MAKING FREE TEXT BOX
Pt w here for 1 month of dysuria and hematuria. VSS. Exam w/ benign abdomen. Concern for UTI. Will obtain ua/ucx.

## 2022-11-11 NOTE — CONSULT NOTE PEDS - PROBLEM SELECTOR RECOMMENDATION 9
-Monitor urine output and color closely, confirming color does not worsen, and patient does not develop large clots and develop retention.  -Color is appropriate at this time, and no sign of retention, no CBI indicated at this time.   -Suggest Medical admission for medical management of infection/inflammation.    -trend creatinine  -Renal u/s in 72hours, to monitor hydro  -Suspect hydro and KATY should improve after treatment with antibiotics, if condition does not improve or worsens, stent placement can be considered.   -Discussed with Dr. Allred -Monitor urine output and color closely, confirming color does not worsen, and patient does not develop large clots and develop retention.  -Color is appropriate at this time, and no sign of retention, no CBI indicated at this time.   -Suggest Medical admission for medical management of infection/inflammation with antibiotics.   -trend creatinine  -Renal u/s in 72hours, to monitor hydro  -Suspect hydro and KATY should improve after treatment with antibiotics, if condition does not improve or worsens, stent placement can be considered.   -Discussed with Dr. Allred

## 2022-11-11 NOTE — ED PROVIDER NOTE - PATIENT PORTAL LINK FT
You can access the FollowMyHealth Patient Portal offered by [facility] by registering at the following website: [email or web address]. By joining [facility]’s FollowMyHealth portal, you will also be able to view your health information using other applications (apps) compatible with our system.

## 2022-11-11 NOTE — ED PROVIDER NOTE - NSFOLLOWUPCLINICS_GEN_ALL_ED_FT
[facility]  [address] [place] [address]  Phone: [phone]  Fax:   Follow Up Time: 4-6 Days

## 2022-11-11 NOTE — CONSULT NOTE PEDS - ASSESSMENT
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

## 2022-11-12 DIAGNOSIS — I10 ESSENTIAL (PRIMARY) HYPERTENSION: ICD-10-CM

## 2022-11-12 DIAGNOSIS — N10 ACUTE PYELONEPHRITIS: ICD-10-CM

## 2022-11-12 DIAGNOSIS — F41.9 ANXIETY DISORDER, UNSPECIFIED: ICD-10-CM

## 2022-11-12 DIAGNOSIS — B20 HUMAN IMMUNODEFICIENCY VIRUS [HIV] DISEASE: ICD-10-CM

## 2022-11-12 DIAGNOSIS — N17.9 ACUTE KIDNEY FAILURE, UNSPECIFIED: ICD-10-CM

## 2022-11-12 LAB
ANION GAP SERPL CALC-SCNC: 12 MMOL/L — SIGNIFICANT CHANGE UP (ref 7–14)
BUN SERPL-MCNC: 17 MG/DL — SIGNIFICANT CHANGE UP (ref 7–23)
CALCIUM SERPL-MCNC: 9.4 MG/DL — SIGNIFICANT CHANGE UP (ref 8.4–10.5)
CHLORIDE SERPL-SCNC: 102 MMOL/L — SIGNIFICANT CHANGE UP (ref 98–107)
CO2 SERPL-SCNC: 25 MMOL/L — SIGNIFICANT CHANGE UP (ref 22–31)
CREAT SERPL-MCNC: 1.28 MG/DL — SIGNIFICANT CHANGE UP (ref 0.5–1.3)
CULTURE RESULTS: SIGNIFICANT CHANGE UP
EGFR: 66 ML/MIN/1.73M2 — SIGNIFICANT CHANGE UP
FLUAV AG NPH QL: SIGNIFICANT CHANGE UP
FLUBV AG NPH QL: SIGNIFICANT CHANGE UP
GLUCOSE SERPL-MCNC: 114 MG/DL — HIGH (ref 70–99)
HCT VFR BLD CALC: 36.3 % — LOW (ref 39–50)
HGB BLD-MCNC: 11.7 G/DL — LOW (ref 13–17)
MAGNESIUM SERPL-MCNC: 2 MG/DL — SIGNIFICANT CHANGE UP (ref 1.6–2.6)
MCHC RBC-ENTMCNC: 27.9 PG — SIGNIFICANT CHANGE UP (ref 27–34)
MCHC RBC-ENTMCNC: 32.2 GM/DL — SIGNIFICANT CHANGE UP (ref 32–36)
MCV RBC AUTO: 86.6 FL — SIGNIFICANT CHANGE UP (ref 80–100)
NRBC # BLD: 0 /100 WBCS — SIGNIFICANT CHANGE UP (ref 0–0)
NRBC # FLD: 0 K/UL — SIGNIFICANT CHANGE UP (ref 0–0)
PHOSPHATE SERPL-MCNC: 3.7 MG/DL — SIGNIFICANT CHANGE UP (ref 2.5–4.5)
PLATELET # BLD AUTO: 315 K/UL — SIGNIFICANT CHANGE UP (ref 150–400)
POTASSIUM SERPL-MCNC: 3.6 MMOL/L — SIGNIFICANT CHANGE UP (ref 3.5–5.3)
POTASSIUM SERPL-SCNC: 3.6 MMOL/L — SIGNIFICANT CHANGE UP (ref 3.5–5.3)
RBC # BLD: 4.19 M/UL — LOW (ref 4.2–5.8)
RBC # FLD: 15.4 % — HIGH (ref 10.3–14.5)
RSV RNA NPH QL NAA+NON-PROBE: SIGNIFICANT CHANGE UP
SARS-COV-2 RNA SPEC QL NAA+PROBE: SIGNIFICANT CHANGE UP
SODIUM SERPL-SCNC: 139 MMOL/L — SIGNIFICANT CHANGE UP (ref 135–145)
SPECIMEN SOURCE: SIGNIFICANT CHANGE UP
WBC # BLD: 5.62 K/UL — SIGNIFICANT CHANGE UP (ref 3.8–10.5)
WBC # FLD AUTO: 5.62 K/UL — SIGNIFICANT CHANGE UP (ref 3.8–10.5)

## 2022-11-12 PROCEDURE — 99223 1ST HOSP IP/OBS HIGH 75: CPT

## 2022-11-12 RX ORDER — BICTEGRAVIR SODIUM, EMTRICITABINE, AND TENOFOVIR ALAFENAMIDE FUMARATE 30; 120; 15 MG/1; MG/1; MG/1
1 TABLET ORAL DAILY
Refills: 0 | Status: DISCONTINUED | OUTPATIENT
Start: 2022-11-12 | End: 2022-11-21

## 2022-11-12 RX ORDER — AMLODIPINE BESYLATE 2.5 MG/1
5 TABLET ORAL DAILY
Refills: 0 | Status: DISCONTINUED | OUTPATIENT
Start: 2022-11-12 | End: 2022-11-14

## 2022-11-12 RX ORDER — ASPIRIN/CALCIUM CARB/MAGNESIUM 324 MG
81 TABLET ORAL DAILY
Refills: 0 | Status: DISCONTINUED | OUTPATIENT
Start: 2022-11-12 | End: 2022-11-16

## 2022-11-12 RX ORDER — ZOLPIDEM TARTRATE 10 MG/1
5 TABLET ORAL AT BEDTIME
Refills: 0 | Status: DISCONTINUED | OUTPATIENT
Start: 2022-11-12 | End: 2022-11-18

## 2022-11-12 RX ORDER — OLANZAPINE 15 MG/1
10 TABLET, FILM COATED ORAL AT BEDTIME
Refills: 0 | Status: DISCONTINUED | OUTPATIENT
Start: 2022-11-12 | End: 2022-11-21

## 2022-11-12 RX ORDER — OLANZAPINE 15 MG/1
10 TABLET, FILM COATED ORAL ONCE
Refills: 0 | Status: COMPLETED | OUTPATIENT
Start: 2022-11-12 | End: 2022-11-12

## 2022-11-12 RX ORDER — ZOLPIDEM TARTRATE 10 MG/1
10 TABLET ORAL ONCE
Refills: 0 | Status: DISCONTINUED | OUTPATIENT
Start: 2022-11-12 | End: 2022-11-12

## 2022-11-12 RX ORDER — METOPROLOL TARTRATE 50 MG
50 TABLET ORAL DAILY
Refills: 0 | Status: DISCONTINUED | OUTPATIENT
Start: 2022-11-12 | End: 2022-11-19

## 2022-11-12 RX ORDER — CEFTRIAXONE 500 MG/1
1000 INJECTION, POWDER, FOR SOLUTION INTRAMUSCULAR; INTRAVENOUS EVERY 24 HOURS
Refills: 0 | Status: DISCONTINUED | OUTPATIENT
Start: 2022-11-12 | End: 2022-11-14

## 2022-11-12 RX ORDER — SODIUM CHLORIDE 9 MG/ML
1000 INJECTION INTRAMUSCULAR; INTRAVENOUS; SUBCUTANEOUS
Refills: 0 | Status: DISCONTINUED | OUTPATIENT
Start: 2022-11-12 | End: 2022-11-13

## 2022-11-12 RX ORDER — ZOLPIDEM TARTRATE 10 MG/1
5 TABLET ORAL AT BEDTIME
Refills: 0 | Status: DISCONTINUED | OUTPATIENT
Start: 2022-11-12 | End: 2022-11-19

## 2022-11-12 RX ORDER — AMLODIPINE BESYLATE 2.5 MG/1
2.5 TABLET ORAL ONCE
Refills: 0 | Status: COMPLETED | OUTPATIENT
Start: 2022-11-12 | End: 2022-11-12

## 2022-11-12 RX ORDER — ATORVASTATIN CALCIUM 80 MG/1
10 TABLET, FILM COATED ORAL AT BEDTIME
Refills: 0 | Status: DISCONTINUED | OUTPATIENT
Start: 2022-11-12 | End: 2022-11-21

## 2022-11-12 RX ORDER — BICTEGRAVIR SODIUM, EMTRICITABINE, AND TENOFOVIR ALAFENAMIDE FUMARATE 30; 120; 15 MG/1; MG/1; MG/1
1 TABLET ORAL
Qty: 0 | Refills: 0 | DISCHARGE

## 2022-11-12 RX ORDER — ALPRAZOLAM 0.25 MG
1 TABLET ORAL
Qty: 0 | Refills: 0 | DISCHARGE

## 2022-11-12 RX ORDER — OLANZAPINE 15 MG/1
1 TABLET, FILM COATED ORAL
Qty: 0 | Refills: 0 | DISCHARGE

## 2022-11-12 RX ORDER — ALPRAZOLAM 0.25 MG
1 TABLET ORAL THREE TIMES A DAY
Refills: 0 | Status: DISCONTINUED | OUTPATIENT
Start: 2022-11-12 | End: 2022-11-12

## 2022-11-12 RX ORDER — ACETAMINOPHEN 500 MG
650 TABLET ORAL EVERY 6 HOURS
Refills: 0 | Status: DISCONTINUED | OUTPATIENT
Start: 2022-11-12 | End: 2022-11-21

## 2022-11-12 RX ADMIN — ZOLPIDEM TARTRATE 5 MILLIGRAM(S): 10 TABLET ORAL at 21:37

## 2022-11-12 RX ADMIN — CEFTRIAXONE 100 MILLIGRAM(S): 500 INJECTION, POWDER, FOR SOLUTION INTRAMUSCULAR; INTRAVENOUS at 14:28

## 2022-11-12 RX ADMIN — SODIUM CHLORIDE 100 MILLILITER(S): 9 INJECTION INTRAMUSCULAR; INTRAVENOUS; SUBCUTANEOUS at 18:54

## 2022-11-12 RX ADMIN — Medication 81 MILLIGRAM(S): at 11:11

## 2022-11-12 RX ADMIN — Medication 50 MILLIGRAM(S): at 06:59

## 2022-11-12 RX ADMIN — ATORVASTATIN CALCIUM 10 MILLIGRAM(S): 80 TABLET, FILM COATED ORAL at 21:35

## 2022-11-12 RX ADMIN — OLANZAPINE 10 MILLIGRAM(S): 15 TABLET, FILM COATED ORAL at 21:34

## 2022-11-12 RX ADMIN — BICTEGRAVIR SODIUM, EMTRICITABINE, AND TENOFOVIR ALAFENAMIDE FUMARATE 1 TABLET(S): 30; 120; 15 TABLET ORAL at 11:11

## 2022-11-12 RX ADMIN — AMLODIPINE BESYLATE 2.5 MILLIGRAM(S): 2.5 TABLET ORAL at 18:24

## 2022-11-12 RX ADMIN — OLANZAPINE 10 MILLIGRAM(S): 15 TABLET, FILM COATED ORAL at 03:35

## 2022-11-12 RX ADMIN — SODIUM CHLORIDE 100 MILLILITER(S): 9 INJECTION INTRAMUSCULAR; INTRAVENOUS; SUBCUTANEOUS at 06:59

## 2022-11-12 RX ADMIN — AMLODIPINE BESYLATE 5 MILLIGRAM(S): 2.5 TABLET ORAL at 06:59

## 2022-11-12 RX ADMIN — ZOLPIDEM TARTRATE 10 MILLIGRAM(S): 10 TABLET ORAL at 03:34

## 2022-11-12 NOTE — H&P ADULT - PROBLEM SELECTOR PLAN 3
CD4 and VL from October 20 2022 were 511 and undetectable  - continue Biktarvy   - Follows regularly with ID, Dr Gordon

## 2022-11-12 NOTE — H&P ADULT - HISTORY OF PRESENT ILLNESS
58 y/o M with pmh of HIV on HAART, DMII, rectal/prostate cancer s/p radiation and abdominoperitoneal resection with end colostomy p/w hematuria.  Pt reports 5 days ago, he started passing slightly red urine. Today, urine became more bloody (fruit punch colored).  He has had dysuria, and low grade temp to 100F.  He reports nausea and vomiting yesterday.  No flank/back pain.      In the ED, pt had positive UA. he was given ceftriaxone and 1L NS.    56 y/o M with pmh of HIV on HAART, DMII, rectal/prostate cancer s/p radiation with perforation of recum/anus s/p abdominoperitoneal resection with end colostomy, and recently placed drain for thigh collection p/w hematuria.  Pt reports 5 days ago, he started passing slightly red urine. Today, urine became more bloody (fruit punch colored).  He has had dysuria, and low grade temp to 100F.  He reports nausea and vomiting yesterday.  No flank/back pain.      In the ED, pt had positive UA. he was given ceftriaxone and 1L NS.

## 2022-11-12 NOTE — H&P ADULT - NSHPPHYSICALEXAM_GEN_ALL_CORE
Vital Signs Last 24 Hrs  T(C): 36.7 (12 Nov 2022 05:38), Max: 37.1 (11 Nov 2022 12:59)  T(F): 98 (12 Nov 2022 05:38), Max: 98.7 (11 Nov 2022 12:59)  HR: 99 (12 Nov 2022 05:38) (81 - 104)  BP: 147/104 (12 Nov 2022 05:38) (134/95 - 154/110)  BP(mean): --  RR: 17 (12 Nov 2022 05:38) (16 - 18)  SpO2: 100% (12 Nov 2022 05:38) (97% - 100%)    Parameters below as of 12 Nov 2022 05:38  Patient On (Oxygen Delivery Method): room air        PHYSICAL EXAM:  GENERAL: No Acute Distress  EYES: conjunctiva and sclera clear  ENMT: Moist mucous membranes   NECK: Supple  PULMONARY: Clear to auscultation bilaterally  CARDIAC: Regular rate and rhythm; No murmurs, rubs, or gallops  GASTROINTESTINAL: Abdomen soft, Nontender, Nondistended; Bowel sounds normal  : No CVA tenderenss  EXTREMITIES:   No clubbing, cyanosis, or pedal edema  PSYCH: Normal Affect, Normal Behavior  NEUROLOGY:   - Mental status A&O x 3  SKIN: No rashes or lesions: Thigh drain draining scant about of serosanguinous fluid  MUSCULOSKELETAL: No joint swelling

## 2022-11-12 NOTE — PATIENT PROFILE ADULT - CONTRAINDICATIONS & PRECAUTIONS (SELECT ALL THAT APPLY)
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

## 2022-11-12 NOTE — H&P ADULT - NSHPREVIEWOFSYSTEMS_GEN_ALL_CORE
Review of Systems:   CONSTITUTIONAL: fever,  chills  EYES: No eye pain, visual disturbances, or discharge  ENMT:  No difficulty hearing, no throat pain  NECK: No pain or stiffness  RESPIRATORY: No cough, No shortness of breath  CARDIOVASCULAR: No chest pain, palpitations, dizziness, or leg swelling  GASTROINTESTINAL: No abdominal pain. + nausea, vomiting  GENITOURINARY: dysuria,  hematuria  NEUROLOGICAL: No headaches, weakness, or numbness  SKIN: No rashes, or lesions   LYMPH NODES: No enlarged glands  ENDOCRINE: No heat or cold intolerance  MUSCULOSKELETAL: No joint pain or swelling  PSYCHIATRIC: No depression or anxiety  HEME/LYMPH: No easy bruising, or bleeding  ALLERGY AND IMMUNOLOGIC: No hives or eczema

## 2022-11-12 NOTE — CHART NOTE - NSCHARTNOTEFT_GEN_A_CORE
Patient seen and examined   Urine sample at bedside red/orange without clots   Denies any suprapubic/abdominal pain  Good PO intake   PE: Drain from thigh in place   Labs: Cr 1.28 from 1.38, Ucx pending  Patient only received 1L IVF yesterday, started on cont IVF this AM, will re-check Cr tomorrow, if no improvement- will have to re-engage Urology regarding hydro   Remainder of plan per H&P

## 2022-11-12 NOTE — ED ADULT NURSE REASSESSMENT NOTE - NS ED NURSE REASSESS COMMENT FT1
pt a&ox4. medicine MD at bedside for eval and aware of vitals. as per MD pt okay to have PO intake. pt respirations even and unlabored with no accessory muscle use. pt in NAD and sitting in stretcher.
received report from  HA celestin. Pt is a/o x 4. pt has no complaints at this time. no complaints of chest pain, headache, nausea, dizziness, vomiting, SOB, fever, chills  verbalized. Pt has 20g iv placed to left hand with no redness or swelling noted. pt right side ostomy in tact. pt drain to left side in tact. pt respirations even and unlabored with no accessory muscle use. pt in NAD
report given to essu 2 rn leslie. pt no complaints. pt respirations even and unlabored with no accessory muscle use. pt stable and moved to essu 2. iv in tact.
Patient at baseline mental status. Denies chest pain, headache and dizziness. Breathing even and unlabored. No pallor or diaphoresis noted at this time.  #20g placed to L hand. Labs drawn and sent as per MD order.
Patient at baseline mental status. Denies chest pain, headache and dizziness. Breathing even and unlabored. No pallor or diaphoresis noted at this time. Patient awaiting CTr.

## 2022-11-12 NOTE — H&P ADULT - NSVTERISKASSESS_GEN_ALL_CORE FT
Medical Assessment Completed on: 12-Nov-2022 03:51 Medical Assessment Completed on: 12-Nov-2022 06:21

## 2022-11-12 NOTE — H&P ADULT - ASSESSMENT
56 y/o M with pmh of HIV on HAART, DMII, rectal/prostate cancer s/p radiation with perforation of recum/anus s/p abdominoperitoneal resection with end colostomy, and recently placed drain for thigh collection p/w hematuria with positive UA and imaging c/w pyelonephritis.

## 2022-11-12 NOTE — H&P ADULT - NSICDXPASTMEDICALHX_GEN_ALL_CORE_FT
PAST MEDICAL HISTORY:  Anal cancer     Anxiety     Depressive disorder     Diverticulosis     HIV infection     HLD (hyperlipidemia)     HTN (hypertension)     Prostate cancer      PAST MEDICAL HISTORY:  Anal cancer     Anxiety     Depressive disorder     Diverticulosis     HIV infection     HLD (hyperlipidemia)     HTN (hypertension)     Lung cancer     Prostate cancer

## 2022-11-12 NOTE — PATIENT PROFILE ADULT - FALL HARM RISK - HARM RISK INTERVENTIONS

## 2022-11-12 NOTE — H&P ADULT - NSHPLABSRESULTS_GEN_ALL_CORE
139  |  100  |  19  ----------------------------<  99  3.9   |  28  |  1.38<H>    Ca    10.2      2022 14:28    TPro  8.9<H>  /  Alb  4.2  /  TBili  <0.2  /  DBili  x   /  AST  14  /  ALT  9   /  AlkPhos  105                              12.9   8.26  )-----------( 360      ( 2022 14:28 )             40.3                   Urinalysis Basic - ( 2022 14:28 )    Color: Brown / Appearance: Slightly Turbid / S.015 / pH: x  Gluc: x / Ketone: Negative  / Bili: Negative / Urobili: <2 mg/dL   Blood: x / Protein: 100 mg/dL / Nitrite: Negative   Leuk Esterase: Small / RBC: >720 /HPF / WBC 13 /HPF   Sq Epi: x / Non Sq Epi: 5 /HPF / Bacteria: Negative      < from: CT Abdomen and Pelvis w/ IV Cont (22 @ 19:05) >    1.  Status post APR with vertical rectus abdominis musculocutaneous flap   reconstruction. Previous pelvic presacral collection is collapsed around   the left transgluteal pigtail drain. No definite fistulization is seen.    2.  Bladder wall thickening, bilateral urothelial enhancement and   increased perinephric inflammation, suggestive of urinary tract   infection/pyelonephritis. The progressed inflammatory changes surrounding   the bilateral mid ureters causing worsening moderate bilateral   hydroureteronephrosis.    < end of copied text >

## 2022-11-12 NOTE — H&P ADULT - PROBLEM SELECTOR PLAN 1
- continue ceftriaxone  - reviewed urology consult  - possible IR for ureteral stents if Cr not improving, however no s/s of obstruction

## 2022-11-13 LAB
ANION GAP SERPL CALC-SCNC: 9 MMOL/L — SIGNIFICANT CHANGE UP (ref 7–14)
BASOPHILS # BLD AUTO: 0.05 K/UL — SIGNIFICANT CHANGE UP (ref 0–0.2)
BASOPHILS NFR BLD AUTO: 0.8 % — SIGNIFICANT CHANGE UP (ref 0–2)
BUN SERPL-MCNC: 16 MG/DL — SIGNIFICANT CHANGE UP (ref 7–23)
CALCIUM SERPL-MCNC: 9.5 MG/DL — SIGNIFICANT CHANGE UP (ref 8.4–10.5)
CHLORIDE SERPL-SCNC: 105 MMOL/L — SIGNIFICANT CHANGE UP (ref 98–107)
CO2 SERPL-SCNC: 26 MMOL/L — SIGNIFICANT CHANGE UP (ref 22–31)
CREAT SERPL-MCNC: 1.36 MG/DL — HIGH (ref 0.5–1.3)
EGFR: 61 ML/MIN/1.73M2 — SIGNIFICANT CHANGE UP
EOSINOPHIL # BLD AUTO: 0.05 K/UL — SIGNIFICANT CHANGE UP (ref 0–0.5)
EOSINOPHIL NFR BLD AUTO: 0.8 % — SIGNIFICANT CHANGE UP (ref 0–6)
GLUCOSE SERPL-MCNC: 93 MG/DL — SIGNIFICANT CHANGE UP (ref 70–99)
HCT VFR BLD CALC: 37.1 % — LOW (ref 39–50)
HGB BLD-MCNC: 11.8 G/DL — LOW (ref 13–17)
IANC: 3.54 K/UL — SIGNIFICANT CHANGE UP (ref 1.8–7.4)
IMM GRANULOCYTES NFR BLD AUTO: 0.2 % — SIGNIFICANT CHANGE UP (ref 0–0.9)
LYMPHOCYTES # BLD AUTO: 2.07 K/UL — SIGNIFICANT CHANGE UP (ref 1–3.3)
LYMPHOCYTES # BLD AUTO: 31.8 % — SIGNIFICANT CHANGE UP (ref 13–44)
MAGNESIUM SERPL-MCNC: 2.1 MG/DL — SIGNIFICANT CHANGE UP (ref 1.6–2.6)
MCHC RBC-ENTMCNC: 27.4 PG — SIGNIFICANT CHANGE UP (ref 27–34)
MCHC RBC-ENTMCNC: 31.8 GM/DL — LOW (ref 32–36)
MCV RBC AUTO: 86.1 FL — SIGNIFICANT CHANGE UP (ref 80–100)
MONOCYTES # BLD AUTO: 0.78 K/UL — SIGNIFICANT CHANGE UP (ref 0–0.9)
MONOCYTES NFR BLD AUTO: 12 % — SIGNIFICANT CHANGE UP (ref 2–14)
NEUTROPHILS # BLD AUTO: 3.54 K/UL — SIGNIFICANT CHANGE UP (ref 1.8–7.4)
NEUTROPHILS NFR BLD AUTO: 54.4 % — SIGNIFICANT CHANGE UP (ref 43–77)
NRBC # BLD: 0 /100 WBCS — SIGNIFICANT CHANGE UP (ref 0–0)
NRBC # FLD: 0 K/UL — SIGNIFICANT CHANGE UP (ref 0–0)
PHOSPHATE SERPL-MCNC: 4.3 MG/DL — SIGNIFICANT CHANGE UP (ref 2.5–4.5)
PLATELET # BLD AUTO: 327 K/UL — SIGNIFICANT CHANGE UP (ref 150–400)
POTASSIUM SERPL-MCNC: 3.4 MMOL/L — LOW (ref 3.5–5.3)
POTASSIUM SERPL-SCNC: 3.4 MMOL/L — LOW (ref 3.5–5.3)
RBC # BLD: 4.31 M/UL — SIGNIFICANT CHANGE UP (ref 4.2–5.8)
RBC # FLD: 15.1 % — HIGH (ref 10.3–14.5)
SODIUM SERPL-SCNC: 140 MMOL/L — SIGNIFICANT CHANGE UP (ref 135–145)
WBC # BLD: 6.5 K/UL — SIGNIFICANT CHANGE UP (ref 3.8–10.5)
WBC # FLD AUTO: 6.5 K/UL — SIGNIFICANT CHANGE UP (ref 3.8–10.5)

## 2022-11-13 PROCEDURE — 99233 SBSQ HOSP IP/OBS HIGH 50: CPT

## 2022-11-13 RX ORDER — SODIUM CHLORIDE 9 MG/ML
1000 INJECTION INTRAMUSCULAR; INTRAVENOUS; SUBCUTANEOUS
Refills: 0 | Status: DISCONTINUED | OUTPATIENT
Start: 2022-11-13 | End: 2022-11-14

## 2022-11-13 RX ORDER — SENNA PLUS 8.6 MG/1
2 TABLET ORAL AT BEDTIME
Refills: 0 | Status: DISCONTINUED | OUTPATIENT
Start: 2022-11-13 | End: 2022-11-21

## 2022-11-13 RX ORDER — HYDRALAZINE HCL 50 MG
2.5 TABLET ORAL ONCE
Refills: 0 | Status: COMPLETED | OUTPATIENT
Start: 2022-11-13 | End: 2022-11-13

## 2022-11-13 RX ORDER — POTASSIUM CHLORIDE 20 MEQ
40 PACKET (EA) ORAL EVERY 4 HOURS
Refills: 0 | Status: COMPLETED | OUTPATIENT
Start: 2022-11-13 | End: 2022-11-13

## 2022-11-13 RX ORDER — LABETALOL HCL 100 MG
2.5 TABLET ORAL ONCE
Refills: 0 | Status: DISCONTINUED | OUTPATIENT
Start: 2022-11-13 | End: 2022-11-13

## 2022-11-13 RX ADMIN — OLANZAPINE 10 MILLIGRAM(S): 15 TABLET, FILM COATED ORAL at 21:25

## 2022-11-13 RX ADMIN — SENNA PLUS 2 TABLET(S): 8.6 TABLET ORAL at 21:24

## 2022-11-13 RX ADMIN — ZOLPIDEM TARTRATE 5 MILLIGRAM(S): 10 TABLET ORAL at 22:29

## 2022-11-13 RX ADMIN — Medication 40 MILLIEQUIVALENT(S): at 10:42

## 2022-11-13 RX ADMIN — Medication 81 MILLIGRAM(S): at 11:56

## 2022-11-13 RX ADMIN — SODIUM CHLORIDE 100 MILLILITER(S): 9 INJECTION INTRAMUSCULAR; INTRAVENOUS; SUBCUTANEOUS at 13:50

## 2022-11-13 RX ADMIN — Medication 50 MILLIGRAM(S): at 05:29

## 2022-11-13 RX ADMIN — ZOLPIDEM TARTRATE 5 MILLIGRAM(S): 10 TABLET ORAL at 21:24

## 2022-11-13 RX ADMIN — Medication 40 MILLIEQUIVALENT(S): at 13:50

## 2022-11-13 RX ADMIN — Medication 2.5 MILLIGRAM(S): at 20:26

## 2022-11-13 RX ADMIN — BICTEGRAVIR SODIUM, EMTRICITABINE, AND TENOFOVIR ALAFENAMIDE FUMARATE 1 TABLET(S): 30; 120; 15 TABLET ORAL at 11:56

## 2022-11-13 RX ADMIN — ATORVASTATIN CALCIUM 10 MILLIGRAM(S): 80 TABLET, FILM COATED ORAL at 21:25

## 2022-11-13 RX ADMIN — AMLODIPINE BESYLATE 5 MILLIGRAM(S): 2.5 TABLET ORAL at 05:29

## 2022-11-13 RX ADMIN — CEFTRIAXONE 100 MILLIGRAM(S): 500 INJECTION, POWDER, FOR SOLUTION INTRAMUSCULAR; INTRAVENOUS at 15:04

## 2022-11-13 NOTE — PROGRESS NOTE ADULT - SUBJECTIVE AND OBJECTIVE BOX
[facility]  Division of Hospital Medicine  Jocelyne Hu MD  Pager: [phone]      Patient is a 57y old  Male who presents with a chief complaint of Hematuria (2022 03:52)      SUBJECTIVE / OVERNIGHT EVENTS: Patient denies dysuria reports continued hematuria. Reports history of kidney stones.   ADDITIONAL REVIEW OF SYSTEMS:    MEDICATIONS  (STANDING):  amLODIPine   Tablet 5 milliGRAM(s) Oral daily  aspirin enteric coated 81 milliGRAM(s) Oral daily  atorvastatin 10 milliGRAM(s) Oral at bedtime  bictegravir 50 mG/emtricitabine 200 mG/tenofovir alafenamide 25 mG (BIKTARVY) 1 Tablet(s) Oral daily  cefTRIAXone   IVPB 1000 milliGRAM(s) IV Intermittent every 24 hours  metoprolol succinate ER 50 milliGRAM(s) Oral daily  OLANZapine 10 milliGRAM(s) Oral at bedtime  potassium chloride    Tablet ER 40 milliEquivalent(s) Oral every 4 hours  sodium chloride 0.9%. 1000 milliLiter(s) (100 mL/Hr) IV Continuous <Continuous>    MEDICATIONS  (PRN):  acetaminophen     Tablet .. 650 milliGRAM(s) Oral every 6 hours PRN Temp greater or equal to 38C (100.4F), Mild Pain (1 - 3)  ALPRAZolam 1 milliGRAM(s) Oral three times a day PRN anxiety  zolpidem 5 milliGRAM(s) Oral at bedtime PRN Insomnia  zolpidem 5 milliGRAM(s) Oral at bedtime PRN Insomnia      CAPILLARY BLOOD GLUCOSE        I&O's Summary    2022 07:01  -  2022 07:00  --------------------------------------------------------  IN: 0 mL / OUT: 900 mL / NET: -900 mL        PHYSICAL EXAM:  Vital Signs Last 24 Hrs  T(C): 37.2 (2022 11:56), Max: 37.2 (2022 11:56)  T(F): 98.9 (2022 11:56), Max: 98.9 (2022 11:56)  HR: 85 (2022 11:56) (85 - 93)  BP: 140/96 (2022 11:56) (131/102 - 151/108)  BP(mean): --  RR: 18 (2022 11:56) (18 - 20)  SpO2: 99% (2022 11:56) (97% - 99%)    Parameters below as of 2022 11:56  Patient On (Oxygen Delivery Method): room air      CONSTITUTIONAL: Middle age male in  NAD, well-developed, well-groomed  RESPIRATORY: Normal respiratory effort; lungs are clear to auscultation bilaterally  CARDIOVASCULAR: Regular rate and rhythm, normal S1 and S2, no murmur/rub/gallop; No lower extremity edema; Peripheral pulses are 2+ bilaterally  ABDOMEN: Nontender to palpation, normoactive bowel sounds, no rebound/guarding; No hepatosplenomegaly  PSYCH: A+O to person, place, and time; affect appropriate  NEUROLOGY: no gross sensory deficits   SKIN: No rashes; no palpable lesions    LABS:                        11.8   6.50  )-----------( 327      ( 2022 05:50 )             37.1     11-13    140  |  105  |  16  ----------------------------<  93  3.4<L>   |  26  |  1.36<H>    Ca    9.5      2022 05:50  Phos  4.3     11-13  Mg     2.10     11-13    TPro  8.9<H>  /  Alb  4.2  /  TBili  <0.2  /  DBili  x   /  AST  14  /  ALT  9   /  AlkPhos  105  11-11          Urinalysis Basic - ( 2022 14:28 )    Color: Brown / Appearance: Slightly Turbid / S.015 / pH: x  Gluc: x / Ketone: Negative  / Bili: Negative / Urobili: <2 mg/dL   Blood: x / Protein: 100 mg/dL / Nitrite: Negative   Leuk Esterase: Small / RBC: >720 /HPF / WBC 13 /HPF   Sq Epi: x / Non Sq Epi: 5 /HPF / Bacteria: Negative        Culture - Urine (collected 2022 14:07)  Source: Clean Catch Clean Catch (Midstream)  Final Report (2022 13:32):    <10,000 CFU/mL Normal Urogenital Genet        RADIOLOGY & ADDITIONAL TESTS:  Results Reviewed:   Imaging Personally Reviewed:  Electrocardiogram Personally Reviewed:    COORDINATION OF CARE:  Care Discussed with Consultants/Other Providers [Y/N]:  Prior or Outpatient Records Reviewed [Y/N]:

## 2022-11-13 NOTE — PROGRESS NOTE ADULT - PROBLEM SELECTOR PLAN 1
- continue ceftriaxone  [ ] F/u urine culture   [ ] F/u renal ultrasound   - reviewed urology consult  - possible IR for ureteral stents if Cr not improving, however no s/s of obstruction

## 2022-11-13 NOTE — PROGRESS NOTE ADULT - ASSESSMENT
58 y/o M with pmh of HIV on HAART, DMII, rectal/prostate cancer s/p radiation with perforation of recum/anus s/p abdominoperitoneal resection with end colostomy, and recently placed drain for thigh collection p/w hematuria with positive UA and imaging c/w pyelonephritis.

## 2022-11-13 NOTE — CHART NOTE - NSCHARTNOTEFT_GEN_A_CORE
Cr 1.36 from 1.28 yesterday (from 1.38 on admission). Consider renal US tomorrow if Cr increases again on AM labs.    For gross hematuria, patient will need evaluation including cystoscopy and cross-sectional imaging including CT urogram if Cr improves (if not already done by outside urologist). This evaluation may be pursued as an outpatient with patient's outside urologist. Cr 1.36 from 1.28 yesterday (from 1.38 on admission). Consider renal US tomorrow if Cr increases again on AM labs.    For gross hematuria, patient will need evaluation including cystoscopy and cross-sectional imaging including CT urogram if Cr improves (if not already done by outside urologist). This evaluation may be pursued as an outpatient with patient's outside urologist.    Consider TB testing (quantiferon gold) given widespread  inflammation on CTAP.

## 2022-11-14 ENCOUNTER — NON-APPOINTMENT (OUTPATIENT)
Age: 57
End: 2022-11-14

## 2022-11-14 DIAGNOSIS — Z29.9 ENCOUNTER FOR PROPHYLACTIC MEASURES, UNSPECIFIED: ICD-10-CM

## 2022-11-14 LAB
ANION GAP SERPL CALC-SCNC: 13 MMOL/L — SIGNIFICANT CHANGE UP (ref 7–14)
BASOPHILS # BLD AUTO: 0.04 K/UL — SIGNIFICANT CHANGE UP (ref 0–0.2)
BASOPHILS NFR BLD AUTO: 0.5 % — SIGNIFICANT CHANGE UP (ref 0–2)
BUN SERPL-MCNC: 11 MG/DL — SIGNIFICANT CHANGE UP (ref 7–23)
CALCIUM SERPL-MCNC: 9.7 MG/DL — SIGNIFICANT CHANGE UP (ref 8.4–10.5)
CHLORIDE SERPL-SCNC: 103 MMOL/L — SIGNIFICANT CHANGE UP (ref 98–107)
CO2 SERPL-SCNC: 24 MMOL/L — SIGNIFICANT CHANGE UP (ref 22–31)
CREAT SERPL-MCNC: 1.31 MG/DL — HIGH (ref 0.5–1.3)
EGFR: 64 ML/MIN/1.73M2 — SIGNIFICANT CHANGE UP
EOSINOPHIL # BLD AUTO: 0.09 K/UL — SIGNIFICANT CHANGE UP (ref 0–0.5)
EOSINOPHIL NFR BLD AUTO: 1.2 % — SIGNIFICANT CHANGE UP (ref 0–6)
GLUCOSE SERPL-MCNC: 102 MG/DL — HIGH (ref 70–99)
HCT VFR BLD CALC: 40.5 % — SIGNIFICANT CHANGE UP (ref 39–50)
HGB BLD-MCNC: 13.2 G/DL — SIGNIFICANT CHANGE UP (ref 13–17)
IANC: 4.62 K/UL — SIGNIFICANT CHANGE UP (ref 1.8–7.4)
IMM GRANULOCYTES NFR BLD AUTO: 0.3 % — SIGNIFICANT CHANGE UP (ref 0–0.9)
LYMPHOCYTES # BLD AUTO: 2.17 K/UL — SIGNIFICANT CHANGE UP (ref 1–3.3)
LYMPHOCYTES # BLD AUTO: 28.1 % — SIGNIFICANT CHANGE UP (ref 13–44)
MAGNESIUM SERPL-MCNC: 2.1 MG/DL — SIGNIFICANT CHANGE UP (ref 1.6–2.6)
MCHC RBC-ENTMCNC: 27.8 PG — SIGNIFICANT CHANGE UP (ref 27–34)
MCHC RBC-ENTMCNC: 32.6 GM/DL — SIGNIFICANT CHANGE UP (ref 32–36)
MCV RBC AUTO: 85.3 FL — SIGNIFICANT CHANGE UP (ref 80–100)
MONOCYTES # BLD AUTO: 0.79 K/UL — SIGNIFICANT CHANGE UP (ref 0–0.9)
MONOCYTES NFR BLD AUTO: 10.2 % — SIGNIFICANT CHANGE UP (ref 2–14)
NEUTROPHILS # BLD AUTO: 4.62 K/UL — SIGNIFICANT CHANGE UP (ref 1.8–7.4)
NEUTROPHILS NFR BLD AUTO: 59.7 % — SIGNIFICANT CHANGE UP (ref 43–77)
NRBC # BLD: 0 /100 WBCS — SIGNIFICANT CHANGE UP (ref 0–0)
NRBC # FLD: 0 K/UL — SIGNIFICANT CHANGE UP (ref 0–0)
PHOSPHATE SERPL-MCNC: 4 MG/DL — SIGNIFICANT CHANGE UP (ref 2.5–4.5)
PLATELET # BLD AUTO: 271 K/UL — SIGNIFICANT CHANGE UP (ref 150–400)
POTASSIUM SERPL-MCNC: 4 MMOL/L — SIGNIFICANT CHANGE UP (ref 3.5–5.3)
POTASSIUM SERPL-SCNC: 4 MMOL/L — SIGNIFICANT CHANGE UP (ref 3.5–5.3)
RBC # BLD: 4.75 M/UL — SIGNIFICANT CHANGE UP (ref 4.2–5.8)
RBC # FLD: 15.3 % — HIGH (ref 10.3–14.5)
SODIUM SERPL-SCNC: 140 MMOL/L — SIGNIFICANT CHANGE UP (ref 135–145)
WBC # BLD: 7.73 K/UL — SIGNIFICANT CHANGE UP (ref 3.8–10.5)
WBC # FLD AUTO: 7.73 K/UL — SIGNIFICANT CHANGE UP (ref 3.8–10.5)

## 2022-11-14 PROCEDURE — 99233 SBSQ HOSP IP/OBS HIGH 50: CPT

## 2022-11-14 PROCEDURE — 76775 US EXAM ABDO BACK WALL LIM: CPT | Mod: 26

## 2022-11-14 PROCEDURE — 99221 1ST HOSP IP/OBS SF/LOW 40: CPT

## 2022-11-14 PROCEDURE — 99222 1ST HOSP IP/OBS MODERATE 55: CPT

## 2022-11-14 RX ORDER — AMLODIPINE BESYLATE 2.5 MG/1
10 TABLET ORAL DAILY
Refills: 0 | Status: DISCONTINUED | OUTPATIENT
Start: 2022-11-14 | End: 2022-11-20

## 2022-11-14 RX ORDER — SODIUM CHLORIDE 9 MG/ML
1000 INJECTION, SOLUTION INTRAVENOUS
Refills: 0 | Status: DISCONTINUED | OUTPATIENT
Start: 2022-11-14 | End: 2022-11-15

## 2022-11-14 RX ADMIN — ZOLPIDEM TARTRATE 5 MILLIGRAM(S): 10 TABLET ORAL at 22:18

## 2022-11-14 RX ADMIN — Medication 650 MILLIGRAM(S): at 18:18

## 2022-11-14 RX ADMIN — Medication 650 MILLIGRAM(S): at 16:48

## 2022-11-14 RX ADMIN — Medication 50 MILLIGRAM(S): at 05:10

## 2022-11-14 RX ADMIN — SENNA PLUS 2 TABLET(S): 8.6 TABLET ORAL at 22:21

## 2022-11-14 RX ADMIN — ATORVASTATIN CALCIUM 10 MILLIGRAM(S): 80 TABLET, FILM COATED ORAL at 22:18

## 2022-11-14 RX ADMIN — SODIUM CHLORIDE 75 MILLILITER(S): 9 INJECTION, SOLUTION INTRAVENOUS at 09:22

## 2022-11-14 RX ADMIN — OLANZAPINE 10 MILLIGRAM(S): 15 TABLET, FILM COATED ORAL at 22:18

## 2022-11-14 RX ADMIN — Medication 81 MILLIGRAM(S): at 12:09

## 2022-11-14 RX ADMIN — BICTEGRAVIR SODIUM, EMTRICITABINE, AND TENOFOVIR ALAFENAMIDE FUMARATE 1 TABLET(S): 30; 120; 15 TABLET ORAL at 12:09

## 2022-11-14 RX ADMIN — AMLODIPINE BESYLATE 10 MILLIGRAM(S): 2.5 TABLET ORAL at 14:56

## 2022-11-14 RX ADMIN — AMLODIPINE BESYLATE 5 MILLIGRAM(S): 2.5 TABLET ORAL at 05:09

## 2022-11-14 NOTE — PROGRESS NOTE ADULT - PROBLEM SELECTOR PLAN 3
CD4 and VL from October 20 2022 were 511 and undetectable  - continue Biktarvy   - follows with Dr. Gordon

## 2022-11-14 NOTE — PROGRESS NOTE ADULT - SUBJECTIVE AND OBJECTIVE BOX
Mickie Cooper MD  [facility] Division of Hospital Medicine  Pager [phone] (M-F 8AM-5PM)  Other Times: [phone]    Patient is a 57y old  Male who presents with a chief complaint of Hematuria (14 Nov 2022 13:38)    SUBJECTIVE / OVERNIGHT EVENTS: patient feeling well this morning, no complaints    MEDICATIONS  (STANDING):  amLODIPine   Tablet 10 milliGRAM(s) Oral daily  aspirin enteric coated 81 milliGRAM(s) Oral daily  atorvastatin 10 milliGRAM(s) Oral at bedtime  bictegravir 50 mG/emtricitabine 200 mG/tenofovir alafenamide 25 mG (BIKTARVY) 1 Tablet(s) Oral daily  cefTRIAXone   IVPB 1000 milliGRAM(s) IV Intermittent every 24 hours  lactated ringers. 1000 milliLiter(s) (75 mL/Hr) IV Continuous <Continuous>  metoprolol succinate ER 50 milliGRAM(s) Oral daily  OLANZapine 10 milliGRAM(s) Oral at bedtime  senna 2 Tablet(s) Oral at bedtime    MEDICATIONS  (PRN):  acetaminophen     Tablet .. 650 milliGRAM(s) Oral every 6 hours PRN Temp greater or equal to 38C (100.4F), Mild Pain (1 - 3)  ALPRAZolam 1 milliGRAM(s) Oral three times a day PRN anxiety  zolpidem 5 milliGRAM(s) Oral at bedtime PRN Insomnia  zolpidem 5 milliGRAM(s) Oral at bedtime PRN Insomnia      PHYSICAL EXAM:  Vital Signs Last 24 Hrs  T(C): 37.3 (14 Nov 2022 10:50), Max: 37.4 (14 Nov 2022 05:09)  T(F): 99.2 (14 Nov 2022 10:50), Max: 99.3 (14 Nov 2022 05:09)  HR: 106 (14 Nov 2022 10:50) (97 - 110)  BP: 146/101 (14 Nov 2022 10:50) (125/102 - 160/112)  RR: 19 (14 Nov 2022 10:50) (18 - 19)  SpO2: 99% (14 Nov 2022 10:50) (98% - 100%)    Parameters below as of 14 Nov 2022 10:50  Patient On (Oxygen Delivery Method): room air        CONSTITUTIONAL: NAD, well-developed, well-groomed  RESPIRATORY: Normal respiratory effort; lungs are clear to auscultation bilaterally  CARDIOVASCULAR: Regular rate and rhythm, normal S1 and S2, no murmur/rub/gallop; No lower extremity edema  GASTROINTESTINAL: Nontender to palpation, normoactive bowel sounds, no rebound/guarding; No hepatosplenomegaly  MUSCULOSKELETAL:  no clubbing or cyanosis of digits; no joint swelling or tenderness to palpation  NEUROLOGY: non-focal; no gross sensory deficits   PSYCH: A+O to person, place, and time; affect appropriate  SKIN: No rashes; warm     LABS:                        13.2   7.73  )-----------( 271      ( 14 Nov 2022 07:35 )             40.5     11-14    140  |  103  |  11  ----------------------------<  102<H>  4.0   |  24  |  1.31<H>    Ca    9.7      14 Nov 2022 07:35  Phos  4.0     11-14  Mg     2.10     11-14                  RADIOLOGY & ADDITIONAL TESTS:  Results Reviewed:   Imaging Personally Reviewed:  Electrocardiogram Personally Reviewed:    COORDINATION OF CARE:  Care Discussed with Consultants/Other Providers [Y/N]:  Prior or Outpatient Records Reviewed [Y/N]:

## 2022-11-14 NOTE — CHART NOTE - NSCHARTNOTEFT_GEN_A_CORE
Infectious disease consulted.     Russell Walker PA-C,   Internal Medicine ACP   In house pager #[phone]

## 2022-11-14 NOTE — PROGRESS NOTE ADULT - ASSESSMENT
57M with hx of HIV on HAART, rectal/prostate cancer s/p radiation with perforation of recum/anus s/p abdominoperitoneal resection with end colostomy, and recently placed drain for thigh collection p/w hematuria with positive UA and imaging c/w pyelonephritis.

## 2022-11-14 NOTE — PROGRESS NOTE ADULT - PROBLEM SELECTOR PLAN 1
presented with hematuria  - UA positive, UCx negative  - CTAP: bilateral hydro and perinephric stranding   - c/w ceftriaxone for now, given hx of HIV on HAART c/s ID re abx given neg UCx  - monitor fever curve

## 2022-11-14 NOTE — CONSULT NOTE ADULT - SUBJECTIVE AND OBJECTIVE BOX
HPI:  58 y/o M with pmh of HIV on HAART, DMII, rectal/prostate cancer s/p radiation with perforation of recum/anus s/p abdominoperitoneal resection with end colostomy, and recently placed drain for thigh collection p/w hematuria.  Pt reports 5 days ago, he started passing slightly red urine. Today, urine became more bloody (fruit punch colored).  He has had dysuria, and low grade temp to 100F.  He reports nausea and vomiting yesterday.  No flank/back pain.      In the ED, pt had positive UA. he was given ceftriaxone and 1L NS.    (12 Nov 2022 03:52)      PAST MEDICAL & SURGICAL HISTORY:  HTN (hypertension)      HLD (hyperlipidemia)      HIV infection      Depressive disorder      Anxiety      Prostate cancer      Anal cancer      Diverticulosis      Lung cancer      Anal lesion          Allergies    No Known Allergies    Intolerances        ANTIMICROBIALS:  bictegravir 50 mG/emtricitabine 200 mG/tenofovir alafenamide 25 mG (BIKTARVY) 1 daily  cefTRIAXone   IVPB 1000 every 24 hours      OTHER MEDS:  acetaminophen     Tablet .. 650 milliGRAM(s) Oral every 6 hours PRN  ALPRAZolam 1 milliGRAM(s) Oral three times a day PRN  amLODIPine   Tablet 10 milliGRAM(s) Oral daily  aspirin enteric coated 81 milliGRAM(s) Oral daily  atorvastatin 10 milliGRAM(s) Oral at bedtime  lactated ringers. 1000 milliLiter(s) IV Continuous <Continuous>  metoprolol succinate ER 50 milliGRAM(s) Oral daily  OLANZapine 10 milliGRAM(s) Oral at bedtime  senna 2 Tablet(s) Oral at bedtime  zolpidem 5 milliGRAM(s) Oral at bedtime PRN  zolpidem 5 milliGRAM(s) Oral at bedtime PRN      SOCIAL HISTORY:  , lives with wife  no smoking, alcohol or drug abuse  no recent travel    FAMILY HISTORY:  FH: prostate cancer    FH: breast cancer        ROS:    All other systems negative     Constitutional: no fever, no chills  Eye: no eye pain, no redness, no vision changes  ENT:  no sore throat, no rhinorrhea  Cardiovascular:  no chest pain, no palpitation  Respiratory:  no SOB, no cough  GI:  no abd pain, no vomiting, no diarrhea  urinary: no dysuria, + hematuria, no flank pain  : no penile discharge or bleeding  musculoskeletal:  no joint pain, no joint swelling  skin:  no rash  neurology:  no headache, no seizure, no change in mental status  psych: no anxiety, no depression     Physical Exam:    General:    NAD, non toxic  Head: atraumatic, normocephalic  Eyes: normal sclera and conjunctiva  ENT:   no oropharyngeal lesions, no LAD, neck supple  Cardio:    regular S1,S2, no murmur  Respiratory:   clear b/l, no wheezing  abd:   soft, BS +, not tender, ostomy  :     no CVAT, no suprapubic tenderness, no davis  Musculoskeletal : no joint swelling, no edema, IR drain  Skin:    no rash  vascular: no phlebitis  Neurologic:     no focal deficits  psych: normal affect      Drug Dosing Weight  Height (cm): 180.3 (11 Nov 2022 12:59)  Weight (kg): 83 (12 Nov 2022 06:30)  BMI (kg/m2): 25.5 (12 Nov 2022 06:30)  BSA (m2): 2.03 (12 Nov 2022 06:30)    Vital Signs Last 24 Hrs  T(F): 99.2 (11-14-22 @ 10:50), Max: 99.3 (11-14-22 @ 05:09)    Vital Signs Last 24 Hrs  HR: 106 (11-14-22 @ 10:50) (97 - 110)  BP: 146/101 (11-14-22 @ 10:50) (125/102 - 160/112)  RR: 19 (11-14-22 @ 10:50)  SpO2: 99% (11-14-22 @ 10:50) (98% - 100%)  Wt(kg): --                          13.2   7.73  )-----------( 271      ( 14 Nov 2022 07:35 )             40.5       11-14    140  |  103  |  11  ----------------------------<  102<H>  4.0   |  24  |  1.31<H>    Ca    9.7      14 Nov 2022 07:35  Phos  4.0     11-14  Mg     2.10     11-14            MICROBIOLOGY:  v  Clean Catch Clean Catch (Midstream)  11-11-22   <10,000 CFU/mL Normal Urogenital Genet  --  --        HIV-1 RNA Quantitative, Viral Load Log: NOT DET. lg /mL (10-20-22 @ 14:47)  HIV-1 RNA Quantitative, Viral Load Log: DET.  <1.47 lg /mL (04-21-22 @ 15:23)  HIV-1 RNA Quantitative, Viral Load Log: NOT DET. lg /mL (03-18-22 @ 12:31)            RADIOLOGY:    Images independently visualized and reviewed personally,  findings as below    < from: US Renal (11.14.22 @ 08:35) >  IMPRESSION:  Moderate bilateral hydronephrosis, unchanged.    < end of copied text >  < from: CT Abdomen and Pelvis w/ IV Cont (11.11.22 @ 19:05) >  IMPRESSION:  1.  Status post APR with vertical rectus abdominis musculocutaneous flap   reconstruction. Previous pelvic presacral collection is collapsed around   the left transgluteal pigtail drain. No definite fistulization is seen.    2.  Bladder wall thickening, bilateral urothelial enhancement and   increased perinephric inflammation, suggestive of urinary tract   infection/pyelonephritis. The progressed inflammatory changes surrounding   the bilateral mid ureters causing worsening moderate bilateral   hydroureteronephrosis.    < end of copied text >

## 2022-11-15 LAB
ANION GAP SERPL CALC-SCNC: 12 MMOL/L — SIGNIFICANT CHANGE UP (ref 7–14)
BUN SERPL-MCNC: 12 MG/DL — SIGNIFICANT CHANGE UP (ref 7–23)
CALCIUM SERPL-MCNC: 9.6 MG/DL — SIGNIFICANT CHANGE UP (ref 8.4–10.5)
CHLORIDE SERPL-SCNC: 100 MMOL/L — SIGNIFICANT CHANGE UP (ref 98–107)
CO2 SERPL-SCNC: 24 MMOL/L — SIGNIFICANT CHANGE UP (ref 22–31)
CREAT SERPL-MCNC: 1.38 MG/DL — HIGH (ref 0.5–1.3)
EGFR: 60 ML/MIN/1.73M2 — SIGNIFICANT CHANGE UP
GAMMA INTERFERON BACKGROUND BLD IA-ACNC: 0.03 IU/ML — SIGNIFICANT CHANGE UP
GLUCOSE SERPL-MCNC: 111 MG/DL — HIGH (ref 70–99)
M TB IFN-G BLD-IMP: NEGATIVE — SIGNIFICANT CHANGE UP
M TB IFN-G CD4+ BCKGRND COR BLD-ACNC: 0 IU/ML — SIGNIFICANT CHANGE UP
M TB IFN-G CD4+CD8+ BCKGRND COR BLD-ACNC: 0.01 IU/ML — SIGNIFICANT CHANGE UP
MAGNESIUM SERPL-MCNC: 2.1 MG/DL — SIGNIFICANT CHANGE UP (ref 1.6–2.6)
PHOSPHATE SERPL-MCNC: 4 MG/DL — SIGNIFICANT CHANGE UP (ref 2.5–4.5)
POTASSIUM SERPL-MCNC: 3.8 MMOL/L — SIGNIFICANT CHANGE UP (ref 3.5–5.3)
POTASSIUM SERPL-SCNC: 3.8 MMOL/L — SIGNIFICANT CHANGE UP (ref 3.5–5.3)
QUANT TB PLUS MITOGEN MINUS NIL: >10 IU/ML — SIGNIFICANT CHANGE UP
SARS-COV-2 RNA SPEC QL NAA+PROBE: SIGNIFICANT CHANGE UP
SODIUM SERPL-SCNC: 136 MMOL/L — SIGNIFICANT CHANGE UP (ref 135–145)

## 2022-11-15 PROCEDURE — 99233 SBSQ HOSP IP/OBS HIGH 50: CPT

## 2022-11-15 RX ORDER — OXYCODONE HYDROCHLORIDE 5 MG/1
5 TABLET ORAL ONCE
Refills: 0 | Status: DISCONTINUED | OUTPATIENT
Start: 2022-11-15 | End: 2022-11-15

## 2022-11-15 RX ADMIN — OLANZAPINE 10 MILLIGRAM(S): 15 TABLET, FILM COATED ORAL at 22:49

## 2022-11-15 RX ADMIN — ZOLPIDEM TARTRATE 5 MILLIGRAM(S): 10 TABLET ORAL at 22:54

## 2022-11-15 RX ADMIN — BICTEGRAVIR SODIUM, EMTRICITABINE, AND TENOFOVIR ALAFENAMIDE FUMARATE 1 TABLET(S): 30; 120; 15 TABLET ORAL at 12:32

## 2022-11-15 RX ADMIN — OXYCODONE HYDROCHLORIDE 5 MILLIGRAM(S): 5 TABLET ORAL at 14:57

## 2022-11-15 RX ADMIN — Medication 50 MILLIGRAM(S): at 05:49

## 2022-11-15 RX ADMIN — Medication 650 MILLIGRAM(S): at 12:47

## 2022-11-15 RX ADMIN — Medication 81 MILLIGRAM(S): at 12:32

## 2022-11-15 RX ADMIN — OXYCODONE HYDROCHLORIDE 5 MILLIGRAM(S): 5 TABLET ORAL at 15:57

## 2022-11-15 RX ADMIN — Medication 650 MILLIGRAM(S): at 14:17

## 2022-11-15 RX ADMIN — SENNA PLUS 2 TABLET(S): 8.6 TABLET ORAL at 22:49

## 2022-11-15 RX ADMIN — ZOLPIDEM TARTRATE 5 MILLIGRAM(S): 10 TABLET ORAL at 22:53

## 2022-11-15 RX ADMIN — AMLODIPINE BESYLATE 10 MILLIGRAM(S): 2.5 TABLET ORAL at 05:49

## 2022-11-15 RX ADMIN — SODIUM CHLORIDE 75 MILLILITER(S): 9 INJECTION, SOLUTION INTRAVENOUS at 05:49

## 2022-11-15 RX ADMIN — ATORVASTATIN CALCIUM 10 MILLIGRAM(S): 80 TABLET, FILM COATED ORAL at 22:49

## 2022-11-15 NOTE — PROGRESS NOTE ADULT - SUBJECTIVE AND OBJECTIVE BOX
Mickie Cooper MD  [facility] Division of Hospital Medicine  Pager [phone] (M-F 8AM-5PM)  Other Times: [phone]    Patient is a 57y old  Male who presents with a chief complaint of Hematuria (14 Nov 2022 14:10)    SUBJECTIVE / OVERNIGHT EVENTS: no acute events overnight    MEDICATIONS  (STANDING):  amLODIPine   Tablet 10 milliGRAM(s) Oral daily  aspirin enteric coated 81 milliGRAM(s) Oral daily  atorvastatin 10 milliGRAM(s) Oral at bedtime  bictegravir 50 mG/emtricitabine 200 mG/tenofovir alafenamide 25 mG (BIKTARVY) 1 Tablet(s) Oral daily  metoprolol succinate ER 50 milliGRAM(s) Oral daily  OLANZapine 10 milliGRAM(s) Oral at bedtime  senna 2 Tablet(s) Oral at bedtime    MEDICATIONS  (PRN):  acetaminophen     Tablet .. 650 milliGRAM(s) Oral every 6 hours PRN Temp greater or equal to 38C (100.4F), Mild Pain (1 - 3)  ALPRAZolam 1 milliGRAM(s) Oral three times a day PRN anxiety  zolpidem 5 milliGRAM(s) Oral at bedtime PRN Insomnia  zolpidem 5 milliGRAM(s) Oral at bedtime PRN Insomnia      PHYSICAL EXAM:  Vital Signs Last 24 Hrs  T(C): 37.5 (15 Nov 2022 12:30), Max: 37.5 (15 Nov 2022 12:30)  T(F): 99.5 (15 Nov 2022 12:30), Max: 99.5 (15 Nov 2022 12:30)  HR: 106 (15 Nov 2022 12:30) (102 - 113)  BP: 118/86 (15 Nov 2022 12:30) (118/86 - 136/106)  BP(mean): --  RR: 18 (15 Nov 2022 12:30) (18 - 18)  SpO2: 100% (15 Nov 2022 12:30) (99% - 100%)    Parameters below as of 15 Nov 2022 12:30  Patient On (Oxygen Delivery Method): room air        CONSTITUTIONAL: NAD, well-developed, well-groomed  RESPIRATORY: Normal respiratory effort; lungs are clear to auscultation bilaterally  CARDIOVASCULAR: Regular rate and rhythm, normal S1 and S2, no murmur/rub/gallop; No lower extremity edema  GASTROINTESTINAL: Nontender to palpation, normoactive bowel sounds, no rebound/guarding; No hepatosplenomegaly  MUSCULOSKELETAL:  no clubbing or cyanosis of digits; no joint swelling or tenderness to palpation  NEUROLOGY: non-focal; no gross sensory deficits   PSYCH: A+O to person, place, and time; affect appropriate  SKIN: No rashes; warm     LABS:                        13.2   7.73  )-----------( 271      ( 14 Nov 2022 07:35 )             40.5     11-15    136  |  100  |  12  ----------------------------<  111<H>  3.8   |  24  |  1.38<H>    Ca    9.6      15 Nov 2022 06:48  Phos  4.0     11-15  Mg     2.10     11-15                  RADIOLOGY & ADDITIONAL TESTS:  Results Reviewed:   Imaging Personally Reviewed:  Electrocardiogram Personally Reviewed:    COORDINATION OF CARE:  Care Discussed with Consultants/Other Providers [Y/N]:  Prior or Outpatient Records Reviewed [Y/N]:

## 2022-11-15 NOTE — PROGRESS NOTE ADULT - SUBJECTIVE AND OBJECTIVE BOX
Interval events:   No acute events        OBJECTIVE:  Vital Signs Last 24 Hrs  T(C): 37.5 (15 Nov 2022 12:30), Max: 37.5 (15 Nov 2022 12:30)  T(F): 99.5 (15 Nov 2022 12:30), Max: 99.5 (15 Nov 2022 12:30)  HR: 106 (15 Nov 2022 12:30) (102 - 113)  BP: 118/86 (15 Nov 2022 12:30) (118/86 - 136/106)  BP(mean): --  RR: 18 (15 Nov 2022 12:30) (18 - 18)  SpO2: 100% (15 Nov 2022 12:30) (99% - 100%)    Parameters below as of 15 Nov 2022 12:30  Patient On (Oxygen Delivery Method): room air        LABS:                        13.2   7.73  )-----------( 271      ( 14 Nov 2022 07:35 )             40.5       11-15    136  |  100  |  12  ----------------------------<  111<H>  3.8   |  24  |  1.38<H>    Ca    9.6      15 Nov 2022 06:48  Phos  4.0     11-15  Mg     2.10     11-15

## 2022-11-15 NOTE — PROGRESS NOTE ADULT - ASSESSMENT
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     - IR consult for bilateral NT   - Continue to trend Cr  - Will follow     Maggi Alves   Available on Teams

## 2022-11-15 NOTE — PROGRESS NOTE ADULT - PROBLEM SELECTOR PLAN 1
presented with hematuria  - UA positive, UCx negative  - CTAP: bilateral hydro and perinephric stranding   - appreciate ID recs - can d/c abx, urine AFB pending   - monitor fever curve

## 2022-11-16 ENCOUNTER — RX RENEWAL (OUTPATIENT)
Age: 57
End: 2022-11-16

## 2022-11-16 ENCOUNTER — APPOINTMENT (OUTPATIENT)
Dept: CARDIOLOGY | Facility: CLINIC | Age: 57
End: 2022-11-16

## 2022-11-16 LAB
ANION GAP SERPL CALC-SCNC: 11 MMOL/L — SIGNIFICANT CHANGE UP (ref 7–14)
APTT BLD: 30.1 SEC — SIGNIFICANT CHANGE UP (ref 27–36.3)
BLD GP AB SCN SERPL QL: NEGATIVE — SIGNIFICANT CHANGE UP
BUN SERPL-MCNC: 13 MG/DL — SIGNIFICANT CHANGE UP (ref 7–23)
CALCIUM SERPL-MCNC: 9.4 MG/DL — SIGNIFICANT CHANGE UP (ref 8.4–10.5)
CHLORIDE SERPL-SCNC: 103 MMOL/L — SIGNIFICANT CHANGE UP (ref 98–107)
CO2 SERPL-SCNC: 23 MMOL/L — SIGNIFICANT CHANGE UP (ref 22–31)
CREAT SERPL-MCNC: 1.38 MG/DL — HIGH (ref 0.5–1.3)
EGFR: 60 ML/MIN/1.73M2 — SIGNIFICANT CHANGE UP
GLUCOSE SERPL-MCNC: 155 MG/DL — HIGH (ref 70–99)
HCT VFR BLD CALC: 36.3 % — LOW (ref 39–50)
HGB BLD-MCNC: 11.9 G/DL — LOW (ref 13–17)
INR BLD: 1.26 RATIO — HIGH (ref 0.88–1.16)
MAGNESIUM SERPL-MCNC: 2.1 MG/DL — SIGNIFICANT CHANGE UP (ref 1.6–2.6)
MCHC RBC-ENTMCNC: 28.1 PG — SIGNIFICANT CHANGE UP (ref 27–34)
MCHC RBC-ENTMCNC: 32.8 GM/DL — SIGNIFICANT CHANGE UP (ref 32–36)
MCV RBC AUTO: 85.6 FL — SIGNIFICANT CHANGE UP (ref 80–100)
NIGHT BLUE STAIN TISS: SIGNIFICANT CHANGE UP
NRBC # BLD: 0 /100 WBCS — SIGNIFICANT CHANGE UP (ref 0–0)
NRBC # FLD: 0 K/UL — SIGNIFICANT CHANGE UP (ref 0–0)
PHOSPHATE SERPL-MCNC: 3.8 MG/DL — SIGNIFICANT CHANGE UP (ref 2.5–4.5)
PLATELET # BLD AUTO: 305 K/UL — SIGNIFICANT CHANGE UP (ref 150–400)
POTASSIUM SERPL-MCNC: 3.8 MMOL/L — SIGNIFICANT CHANGE UP (ref 3.5–5.3)
POTASSIUM SERPL-SCNC: 3.8 MMOL/L — SIGNIFICANT CHANGE UP (ref 3.5–5.3)
PROTHROM AB SERPL-ACNC: 14.7 SEC — HIGH (ref 10.5–13.4)
RBC # BLD: 4.24 M/UL — SIGNIFICANT CHANGE UP (ref 4.2–5.8)
RBC # FLD: 15.1 % — HIGH (ref 10.3–14.5)
RH IG SCN BLD-IMP: NEGATIVE — SIGNIFICANT CHANGE UP
SODIUM SERPL-SCNC: 137 MMOL/L — SIGNIFICANT CHANGE UP (ref 135–145)
SPECIMEN SOURCE: SIGNIFICANT CHANGE UP
WBC # BLD: 8.66 K/UL — SIGNIFICANT CHANGE UP (ref 3.8–10.5)
WBC # FLD AUTO: 8.66 K/UL — SIGNIFICANT CHANGE UP (ref 3.8–10.5)

## 2022-11-16 PROCEDURE — 99232 SBSQ HOSP IP/OBS MODERATE 35: CPT | Mod: GC

## 2022-11-16 PROCEDURE — 99233 SBSQ HOSP IP/OBS HIGH 50: CPT

## 2022-11-16 PROCEDURE — 99231 SBSQ HOSP IP/OBS SF/LOW 25: CPT

## 2022-11-16 RX ORDER — OXYCODONE AND ACETAMINOPHEN 5; 325 MG/1; MG/1
1 TABLET ORAL EVERY 6 HOURS
Refills: 0 | Status: DISCONTINUED | OUTPATIENT
Start: 2022-11-16 | End: 2022-11-17

## 2022-11-16 RX ADMIN — ZOLPIDEM TARTRATE 5 MILLIGRAM(S): 10 TABLET ORAL at 21:29

## 2022-11-16 RX ADMIN — Medication 650 MILLIGRAM(S): at 10:39

## 2022-11-16 RX ADMIN — ATORVASTATIN CALCIUM 10 MILLIGRAM(S): 80 TABLET, FILM COATED ORAL at 21:29

## 2022-11-16 RX ADMIN — OXYCODONE AND ACETAMINOPHEN 1 TABLET(S): 5; 325 TABLET ORAL at 12:34

## 2022-11-16 RX ADMIN — OXYCODONE AND ACETAMINOPHEN 1 TABLET(S): 5; 325 TABLET ORAL at 13:34

## 2022-11-16 RX ADMIN — OLANZAPINE 10 MILLIGRAM(S): 15 TABLET, FILM COATED ORAL at 21:30

## 2022-11-16 RX ADMIN — AMLODIPINE BESYLATE 10 MILLIGRAM(S): 2.5 TABLET ORAL at 06:09

## 2022-11-16 RX ADMIN — Medication 50 MILLIGRAM(S): at 06:09

## 2022-11-16 RX ADMIN — BICTEGRAVIR SODIUM, EMTRICITABINE, AND TENOFOVIR ALAFENAMIDE FUMARATE 1 TABLET(S): 30; 120; 15 TABLET ORAL at 12:34

## 2022-11-16 NOTE — PROGRESS NOTE ADULT - SUBJECTIVE AND OBJECTIVE BOX
Follow Up:  HIV, hematuria, hydro    Interval History/ROS: pt has been off antibiotics, afebrile, no dysuria or hematuria, no vomiting, has some back pain, plan for nephrostomy        Allergies  No Known Allergies        ANTIMICROBIALS:  bictegravir 50 mG/emtricitabine 200 mG/tenofovir alafenamide 25 mG (BIKTARVY) 1 daily      OTHER MEDS:  acetaminophen     Tablet .. 650 milliGRAM(s) Oral every 6 hours PRN  ALPRAZolam 1 milliGRAM(s) Oral three times a day PRN  amLODIPine   Tablet 10 milliGRAM(s) Oral daily  atorvastatin 10 milliGRAM(s) Oral at bedtime  metoprolol succinate ER 50 milliGRAM(s) Oral daily  OLANZapine 10 milliGRAM(s) Oral at bedtime  oxycodone    5 mG/acetaminophen 325 mG 1 Tablet(s) Oral every 6 hours PRN  senna 2 Tablet(s) Oral at bedtime  zolpidem 5 milliGRAM(s) Oral at bedtime PRN  zolpidem 5 milliGRAM(s) Oral at bedtime PRN      Vital Signs Last 24 Hrs  T(C): 36.6 (16 Nov 2022 06:54), Max: 37.5 (15 Nov 2022 12:30)  T(F): 97.8 (16 Nov 2022 06:54), Max: 99.5 (15 Nov 2022 12:30)  HR: 83 (16 Nov 2022 06:54) (83 - 106)  BP: 130/87 (16 Nov 2022 06:54) (118/86 - 130/87)  BP(mean): --  RR: 18 (16 Nov 2022 06:54) (17 - 18)  SpO2: 99% (16 Nov 2022 06:54) (99% - 100%)    Parameters below as of 16 Nov 2022 06:54  Patient On (Oxygen Delivery Method): room air        Physical Exam:  General:    NAD, non toxic  Respiratory:   comfortable on RA  abd:   soft, BS +, not tender, ostomy  :     no CVAT, no suprapubic tenderness, no davis  Musculoskeletal : no joint swelling, no edema, IR drain  Skin:    no rash  vascular: no phlebitis                          11.9   8.66  )-----------( 305      ( 16 Nov 2022 10:25 )             36.3       11-16    137  |  103  |  13  ----------------------------<  155<H>  3.8   |  23  |  1.38<H>    Ca    9.4      16 Nov 2022 10:25  Phos  3.8     11-16  Mg     2.10     11-16            MICROBIOLOGY:  v  .Urine Urine  11-15-22 --  --  --      Clean Catch Clean Catch (Midstream)  11-11-22   <10,000 CFU/mL Normal Urogenital Genet  --  --        HIV-1 RNA Quantitative, Viral Load Log: NOT DET. lg /mL (10-20-22 @ 14:47)  HIV-1 RNA Quantitative, Viral Load Log: DET.  <1.47 lg /mL (04-21-22 @ 15:23)  HIV-1 RNA Quantitative, Viral Load Log: NOT DET. lg /mL (03-18-22 @ 12:31)          RADIOLOGY:  Images independently visualized and reviewed personally, findings as below  < from: US Renal (11.14.22 @ 08:35) >  IMPRESSION:  Moderate bilateral hydronephrosis, unchanged.    < end of copied text >  < from: CT Abdomen and Pelvis w/ IV Cont (11.11.22 @ 19:05) >  IMPRESSION:  1.  Status post APR with vertical rectus abdominis musculocutaneous flap   reconstruction. Previous pelvic presacral collection is collapsed around   the left transgluteal pigtail drain. No definite fistulization is seen.    2.  Bladder wall thickening, bilateral urothelial enhancement and   increased perinephric inflammation, suggestive of urinary tract   infection/pyelonephritis. The progressed inflammatory changes surrounding   the bilateral mid ureters causing worsening moderate bilateral   hydroureteronephrosis.    < end of copied text >

## 2022-11-16 NOTE — CHART NOTE - NSCHARTNOTEFT_GEN_A_CORE
INTERVENTIONAL RADIOLOGY FOLLOW UP NOTE    Patient is not NPO. As patient clinically doing well will proceed to place nephrostomy tubes tomorrow.    NPO midnight tonight   Stat 4 am cbc, bmp, coags, type and screen  active covid within 5 days of procedure  hold am AC

## 2022-11-16 NOTE — PROGRESS NOTE ADULT - ASSESSMENT
58 y/o male with a medical history of HIV, DMII, prostate cancer s/p radiation ~7 years ago, currently on Lupron, rectal ca s/p colostomy, lower GIB, now s/p APR with end colostomy creation, VRAM flap closure on 8/24, s/p left thigh/pelvis abscess drain placement on 9/16 via IR, presenting with gross hematuria x 1 week, b/l hydro and KATY in setting of inflammation, likely radiation cystitis.     - IR consult for bilateral NT   - Prefer NT over stents given ability to perform cap trial in the future and radiation cystitis likely to be worsened by cysto   - Continue to trend Cr  - Will follow     Maggi Alves   Available on Teams

## 2022-11-16 NOTE — PROGRESS NOTE ADULT - SUBJECTIVE AND OBJECTIVE BOX
Mickie Cooper MD  [facility] Division of Hospital Medicine  Pager [phone] (M-F 8AM-5PM)  Other Times: [phone]    Patient is a 57y old  Male who presents with a chief complaint of Hematuria (16 Nov 2022 12:18)    SUBJECTIVE / OVERNIGHT EVENTS: no acute events overnight    MEDICATIONS  (STANDING):  amLODIPine   Tablet 10 milliGRAM(s) Oral daily  atorvastatin 10 milliGRAM(s) Oral at bedtime  bictegravir 50 mG/emtricitabine 200 mG/tenofovir alafenamide 25 mG (BIKTARVY) 1 Tablet(s) Oral daily  metoprolol succinate ER 50 milliGRAM(s) Oral daily  OLANZapine 10 milliGRAM(s) Oral at bedtime  senna 2 Tablet(s) Oral at bedtime    MEDICATIONS  (PRN):  acetaminophen     Tablet .. 650 milliGRAM(s) Oral every 6 hours PRN Temp greater or equal to 38C (100.4F), Mild Pain (1 - 3)  ALPRAZolam 1 milliGRAM(s) Oral three times a day PRN anxiety  oxycodone    5 mG/acetaminophen 325 mG 1 Tablet(s) Oral every 6 hours PRN Severe Pain (7 - 10)  zolpidem 5 milliGRAM(s) Oral at bedtime PRN Insomnia  zolpidem 5 milliGRAM(s) Oral at bedtime PRN Insomnia      PHYSICAL EXAM:  Vital Signs Last 24 Hrs  T(C): 37.8 (16 Nov 2022 12:26), Max: 37.8 (16 Nov 2022 12:26)  T(F): 100 (16 Nov 2022 12:26), Max: 100 (16 Nov 2022 12:26)  HR: 111 (16 Nov 2022 12:26) (83 - 111)  BP: 115/91 (16 Nov 2022 12:26) (115/91 - 130/87)  RR: 18 (16 Nov 2022 12:26) (17 - 18)  SpO2: 98% (16 Nov 2022 12:26) (98% - 100%)    Parameters below as of 16 Nov 2022 12:26  Patient On (Oxygen Delivery Method): room air    CONSTITUTIONAL: NAD, well-developed, well-groomed  RESPIRATORY: Normal respiratory effort; lungs are clear to auscultation bilaterally  CARDIOVASCULAR: Regular rate and rhythm, normal S1 and S2, no murmur/rub/gallop; No lower extremity edema  GASTROINTESTINAL: Nontender to palpation, normoactive bowel sounds, no rebound/guarding; No hepatosplenomegaly  MUSCULOSKELETAL:  no clubbing or cyanosis of digits; no joint swelling or tenderness to palpation  NEUROLOGY: non-focal; no gross sensory deficits   PSYCH: A+O to person, place, and time; affect appropriate  SKIN: No rashes; warm     LABS:                        11.9   8.66  )-----------( 305      ( 16 Nov 2022 10:25 )             36.3     11-16    137  |  103  |  13  ----------------------------<  155<H>  3.8   |  23  |  1.38<H>    Ca    9.4      16 Nov 2022 10:25  Phos  3.8     11-16  Mg     2.10     11-16      PT/INR - ( 16 Nov 2022 10:25 )   PT: 14.7 sec;   INR: 1.26 ratio         PTT - ( 16 Nov 2022 10:25 )  PTT:30.1 sec          Culture - Acid Fast - Urine (collected 15 Nov 2022 12:35)  Source: .Urine Urine        RADIOLOGY & ADDITIONAL TESTS:  Results Reviewed:   Imaging Personally Reviewed:  Electrocardiogram Personally Reviewed:    COORDINATION OF CARE:  Care Discussed with Consultants/Other Providers [Y/N]:  Prior or Outpatient Records Reviewed [Y/N]:

## 2022-11-16 NOTE — CONSULT NOTE ADULT - ASSESSMENT
57 m with HTN, HIV Biktarvy, prostate ca 2016 s/p radiation, anal cancer (dx 4/2021) s/p radiation and chemotherapy (last 8/2021) complicated by an ulcer, abscess and rectal bleeding  s/p loop colostomy 3/30/22 and then s/p APR with end colostomy creation, VRAM flap closure, 8/24 found to have a large abscess as well, abscess cx with clostridium, bacteroides   again was admitted for GIB and pelvic/groin abscess s/p drain placement and CT also showed L ischial tuberosity osteo so pt received zosyn in the hospital and completed with PO levo and flagyl to finish a 6 week course, still has the thigh IR drain  now p/w hematuria but denied fever, dysuria or flank pain  here afebrile, WBC normal and  u/a with hematuria  KATY with Cr: 1.38  abd/pelvis CT:  Status post APR with vertical rectus abdominis musculocutaneous flap reconstruction. Previous pelvic presacral collection is collapsed around the left transgluteal pigtail drain.   Bladder wall thickening, bilateral urothelial enhancement and increased perinephric inflammation, suggestive of urinary tract infection/pyelonephritis. The progressed inflammatory changes surrounding the bilateral mid ureters causing worsening moderate bilateral hydroureteronephrosis.  urine cx negative    HIV controlled on Biktarvy, last CD4: 511 and VL ND, prostate ca s/p RT, anal ca s/p chemo and RT c/b anal ulcer, abscess s/p loop colostomy 3/30/22 and then APR with end colostomy creation, VRAM flap closure, 8/24 found to have a large abscess as well, abscess cx with clostridium, bacteroides, s/p IR drain still in place but abscess resolved, also ischial osteo s/p 6 weeks of antibiotics  now with hematuria, KATY and evidence of bladder and urothelial enhancement but urine cx negative and no fever or WBC, likely non infectious and ?radiation induced      * urology raised concern for TB but pt had negative quantiferon before so unlikely and also afebrile, will check urine AFB   * s/p 4 days of ceftriaxone but urine cx negative, discontinue  * f/u with urology    The above assessment and plan was discussed with the primary team    Denise Gordon MD  contact on teams  After 5pm and on weekends call [phone]    
Interventional Radiology    Evaluate for Procedure: Bilateral nephrostomy tubes    HPI: 58 y/o male with a medical history of HIV, DMII, prostate cancer s/p radiation ~7 years ago, currently on Lupron, rectal ca s/p colostomy, lower GIB, now s/p APR with end colostomy creation, VRAM flap closure on 8/24, s/p left thigh/pelvis abscess drain placement on 9/16 via IR, presenting with gross hematuria x 1 week, b/l hydro and KATY in setting of inflammation, likely radiation cystitis.   - IR consult for bilateral NT    Allergies:   Medications (Abx/Cardiac/Anticoagulation/Blood Products)    amLODIPine   Tablet: 10 milliGRAM(s) Oral (11-16 @ 06:09)  aspirin enteric coated: 81 milliGRAM(s) Oral (11-15 @ 12:32)  bictegravir 50 mG/emtricitabine 200 mG/tenofovir alafenamide 25 mG (BIKTARVY): 1 Tablet(s) Oral (11-15 @ 12:32)  metoprolol succinate ER: 50 milliGRAM(s) Oral (11-16 @ 06:09)    Data:    T(C): 36.6  HR: 83  BP: 130/87  RR: 18  SpO2: 99%    -WBC 7.73 / HgB 13.2 / Hct 40.5 / Plt 271  -Na 136 / Cl 100 / BUN 12 / Glucose 111  -K 3.8 / CO2 24 / Cr 1.38  -ALT -- / Alk Phos -- / T.Bili --  -INR 1.35 / PTT 27.0      Radiology:   imaging reviewed    Assessment/Plan:   58 y/o male with a medical history of HIV, DMII, prostate cancer s/p radiation ~7 years ago, currently on Lupron, rectal ca s/p colostomy, lower GIB, now s/p APR with end colostomy creation, VRAM flap closure on 8/24, s/p left thigh/pelvis abscess drain placement on 9/16 via IR, presenting with gross hematuria x 1 week, b/l hydro and KATY in setting of inflammation, likely radiation cystitis. IR consult for bilateral NT    -- IR will plan to place nephrostomy tubes today  -- NPO   -- active covid pcr within 5 days  -- stat cbc, bmp, coags, type and screen now  -- hold anticoagulation  -- please place IR procedure request order under Dr. Ramírez

## 2022-11-16 NOTE — PROGRESS NOTE ADULT - ASSESSMENT
57 m with HTN, HIV Biktarvy, prostate ca 2016 s/p radiation, anal cancer (dx 4/2021) s/p radiation and chemotherapy (last 8/2021) complicated by an ulcer, abscess and rectal bleeding  s/p loop colostomy 3/30/22 and then s/p APR with end colostomy creation, VRAM flap closure, 8/24 found to have a large abscess as well, abscess cx with clostridium, bacteroides   again was admitted for GIB and pelvic/groin abscess s/p drain placement and CT also showed L ischial tuberosity osteo so pt received zosyn in the hospital and completed with PO levo and flagyl to finish a 6 week course, still has the thigh IR drain  now p/w hematuria but denied fever, dysuria or flank pain  here afebrile, WBC normal and  u/a with hematuria  KATY with Cr: 1.38  abd/pelvis CT:  Status post APR with vertical rectus abdominis musculocutaneous flap reconstruction. Previous pelvic presacral collection is collapsed around the left transgluteal pigtail drain.   Bladder wall thickening, bilateral urothelial enhancement and increased perinephric inflammation, suggestive of urinary tract infection/pyelonephritis. The progressed inflammatory changes surrounding the bilateral mid ureters causing worsening moderate bilateral hydroureteronephrosis.  urine cx negative    HIV controlled on Biktarvy, last CD4: 511 and VL ND, prostate ca s/p RT, anal ca s/p chemo and RT c/b anal ulcer, abscess s/p loop colostomy 3/30/22 and then APR with end colostomy creation, VRAM flap closure, 8/24 found to have a large abscess as well, abscess cx with clostridium, bacteroides, s/p IR drain still in place but abscess resolved, also ischial osteo s/p 6 weeks of antibiotics  now with hematuria, KATY and evidence of bladder and urothelial enhancement but urine cx negative and no fever or WBC, likely non infectious and ?radiation induced    plan for nephrostomy for hydro  * urology raised concern for TB but pt had negative quantiferon before and also now so unlikely and also afebrile, f/u the urine AFB   * s/p 4 days of ceftriaxone and discontinued as the urine cx was negative  * f/u with urology    The above assessment and plan was discussed with the primary team    Denise Gordon MD  contact on teams  After 5pm and on weekends call [phone]

## 2022-11-16 NOTE — CHART NOTE - NSCHARTNOTEFT_GEN_A_CORE
Interventional Radiology Follow Up Note:    58 y/o male with a medical history of HIV, DMII, prostate cancer s/p radiation ~7 years ago, currently on Lupron, rectal ca s/p colostomy, lower GIB, now s/p APR with end colostomy creation, VRAM flap closure on 8/24, s/p left thigh/pelvis abscess drain placement on 9/16 via IR, presenting with gross hematuria x 1 week, b/l hydro and KATY in setting of inflammation, likely radiation cystitis. Planned for b/l nephrostomy tube placement tomorrow.    IR was reconsulted for left thigh/pelvis abscess drain check.    -no fluid collection seen on CT abd/pelvis today  -however no available outputs  -monitor outputs overnight and will assess tomorrow

## 2022-11-16 NOTE — CHART NOTE - NSCHARTNOTEFT_GEN_A_CORE
Interventional Radiology Pre-Procedure Checklist     Patient Age: 57 years     Patient Gender: Male     Procedure (including site / side if known): 56 y/o male with a medical history of HIV, DMII, prostate cancer s/p radiation ~7 years ago, currently on Lupron, rectal ca s/p colostomy, lower GIB, now s/p APR with end colostomy creation, VRAM flap closure on 8/24, s/p left thigh/pelvis abscess drain placement on 9/16 via IR, presenting with gross hematuria x 1 week, b/l hydro and KATY in setting of inflammation, likely radiation cystitis.     Diagnosis / Indication: Bilateral hydro with KATY     Interventional Radiology Attending Physician: Dr. Ramírez     Ordering Attending Physician: Dr. Cooper     Pertinent medical history:    Pertinent labs:  11-15    136  |  100  |  12  ----------------------------<  111<H>  3.8   |  24  |  1.38<H>    Ca    9.6      15 Nov 2022 06:48  Phos  4.0     11-15  Mg     2.10     11-15      Patient and Family aware? Yes

## 2022-11-16 NOTE — PROGRESS NOTE ADULT - ATTENDING COMMENTS
He was seen and examined, above noted, discussed the benefits of stent vs nephrostomy tubes again, he agrees to proceed with placement of nephrostomy tubes.
He was seen with family at bedside, above was reviewed with them. Continued observation, bilateral ureteral stents and bilateral neph. tubes were discussed as options. If stents are placed, he will require general anesthesia for stent exchanges and also difficult to tell how long he will require stents. With neph. tubes there is the option of capping neph. tubes in the future to see if hydro has resolved. He agrees with neph. tubes placement, pending IR evaluation.

## 2022-11-16 NOTE — PROGRESS NOTE ADULT - SUBJECTIVE AND OBJECTIVE BOX
Interval events:   No acute events          OBJECTIVE:  Vital Signs Last 24 Hrs  T(C): 36.6 (15 Nov 2022 18:40), Max: 37.5 (15 Nov 2022 12:30)  T(F): 97.8 (15 Nov 2022 18:40), Max: 99.5 (15 Nov 2022 12:30)  HR: 90 (15 Nov 2022 18:40) (90 - 106)  BP: 128/85 (15 Nov 2022 18:40) (118/86 - 128/85)  BP(mean): --  RR: 17 (15 Nov 2022 18:40) (17 - 18)  SpO2: 100% (15 Nov 2022 18:40) (100% - 100%)    Parameters below as of 15 Nov 2022 18:40  Patient On (Oxygen Delivery Method): room air            LABS:                        13.2   7.73  )-----------( 271      ( 14 Nov 2022 07:35 )             40.5       11-15    136  |  100  |  12  ----------------------------<  111<H>  3.8   |  24  |  1.38<H>    Ca    9.6      15 Nov 2022 06:48  Phos  4.0     11-15  Mg     2.10     11-15

## 2022-11-17 DIAGNOSIS — C20 MALIGNANT NEOPLASM OF RECTUM: ICD-10-CM

## 2022-11-17 LAB
ANION GAP SERPL CALC-SCNC: 11 MMOL/L — SIGNIFICANT CHANGE UP (ref 7–14)
APPEARANCE UR: CLEAR — SIGNIFICANT CHANGE UP
BACTERIA # UR AUTO: NEGATIVE — SIGNIFICANT CHANGE UP
BASOPHILS # BLD AUTO: 0.03 K/UL — SIGNIFICANT CHANGE UP (ref 0–0.2)
BASOPHILS NFR BLD AUTO: 0.4 % — SIGNIFICANT CHANGE UP (ref 0–2)
BILIRUB UR-MCNC: NEGATIVE — SIGNIFICANT CHANGE UP
BLD GP AB SCN SERPL QL: NEGATIVE — SIGNIFICANT CHANGE UP
BUN SERPL-MCNC: 15 MG/DL — SIGNIFICANT CHANGE UP (ref 7–23)
CALCIUM SERPL-MCNC: 9.5 MG/DL — SIGNIFICANT CHANGE UP (ref 8.4–10.5)
CHLORIDE SERPL-SCNC: 101 MMOL/L — SIGNIFICANT CHANGE UP (ref 98–107)
CO2 SERPL-SCNC: 24 MMOL/L — SIGNIFICANT CHANGE UP (ref 22–31)
COLOR SPEC: SIGNIFICANT CHANGE UP
CREAT SERPL-MCNC: 1.42 MG/DL — HIGH (ref 0.5–1.3)
DIFF PNL FLD: ABNORMAL
EGFR: 58 ML/MIN/1.73M2 — LOW
EOSINOPHIL # BLD AUTO: 0.02 K/UL — SIGNIFICANT CHANGE UP (ref 0–0.5)
EOSINOPHIL NFR BLD AUTO: 0.2 % — SIGNIFICANT CHANGE UP (ref 0–6)
EPI CELLS # UR: 3 /HPF — SIGNIFICANT CHANGE UP (ref 0–5)
GLUCOSE SERPL-MCNC: 98 MG/DL — SIGNIFICANT CHANGE UP (ref 70–99)
GLUCOSE UR QL: NEGATIVE — SIGNIFICANT CHANGE UP
HCT VFR BLD CALC: 35.3 % — LOW (ref 39–50)
HCT VFR BLD CALC: 41.5 % — SIGNIFICANT CHANGE UP (ref 39–50)
HGB BLD-MCNC: 11.6 G/DL — LOW (ref 13–17)
HGB BLD-MCNC: 13.6 G/DL — SIGNIFICANT CHANGE UP (ref 13–17)
HYALINE CASTS # UR AUTO: 1 /LPF — SIGNIFICANT CHANGE UP (ref 0–7)
IANC: 5.12 K/UL — SIGNIFICANT CHANGE UP (ref 1.8–7.4)
IMM GRANULOCYTES NFR BLD AUTO: 0.5 % — SIGNIFICANT CHANGE UP (ref 0–0.9)
KETONES UR-MCNC: NEGATIVE — SIGNIFICANT CHANGE UP
LEUKOCYTE ESTERASE UR-ACNC: ABNORMAL
LYMPHOCYTES # BLD AUTO: 2.27 K/UL — SIGNIFICANT CHANGE UP (ref 1–3.3)
LYMPHOCYTES # BLD AUTO: 27.6 % — SIGNIFICANT CHANGE UP (ref 13–44)
MAGNESIUM SERPL-MCNC: 2.1 MG/DL — SIGNIFICANT CHANGE UP (ref 1.6–2.6)
MCHC RBC-ENTMCNC: 27.7 PG — SIGNIFICANT CHANGE UP (ref 27–34)
MCHC RBC-ENTMCNC: 28.2 PG — SIGNIFICANT CHANGE UP (ref 27–34)
MCHC RBC-ENTMCNC: 32.8 GM/DL — SIGNIFICANT CHANGE UP (ref 32–36)
MCHC RBC-ENTMCNC: 32.9 GM/DL — SIGNIFICANT CHANGE UP (ref 32–36)
MCV RBC AUTO: 84.2 FL — SIGNIFICANT CHANGE UP (ref 80–100)
MCV RBC AUTO: 85.9 FL — SIGNIFICANT CHANGE UP (ref 80–100)
MONOCYTES # BLD AUTO: 0.73 K/UL — SIGNIFICANT CHANGE UP (ref 0–0.9)
MONOCYTES NFR BLD AUTO: 8.9 % — SIGNIFICANT CHANGE UP (ref 2–14)
NEUTROPHILS # BLD AUTO: 5.12 K/UL — SIGNIFICANT CHANGE UP (ref 1.8–7.4)
NEUTROPHILS NFR BLD AUTO: 62.4 % — SIGNIFICANT CHANGE UP (ref 43–77)
NIGHT BLUE STAIN TISS: SIGNIFICANT CHANGE UP
NITRITE UR-MCNC: NEGATIVE — SIGNIFICANT CHANGE UP
NRBC # BLD: 0 /100 WBCS — SIGNIFICANT CHANGE UP (ref 0–0)
NRBC # BLD: 0 /100 WBCS — SIGNIFICANT CHANGE UP (ref 0–0)
NRBC # FLD: 0 K/UL — SIGNIFICANT CHANGE UP (ref 0–0)
NRBC # FLD: 0 K/UL — SIGNIFICANT CHANGE UP (ref 0–0)
PH UR: 6 — SIGNIFICANT CHANGE UP (ref 5–8)
PHOSPHATE SERPL-MCNC: 4.1 MG/DL — SIGNIFICANT CHANGE UP (ref 2.5–4.5)
PLATELET # BLD AUTO: 316 K/UL — SIGNIFICANT CHANGE UP (ref 150–400)
PLATELET # BLD AUTO: 357 K/UL — SIGNIFICANT CHANGE UP (ref 150–400)
POTASSIUM SERPL-MCNC: 3.7 MMOL/L — SIGNIFICANT CHANGE UP (ref 3.5–5.3)
POTASSIUM SERPL-SCNC: 3.7 MMOL/L — SIGNIFICANT CHANGE UP (ref 3.5–5.3)
PROT UR-MCNC: ABNORMAL
RBC # BLD: 4.19 M/UL — LOW (ref 4.2–5.8)
RBC # BLD: 4.83 M/UL — SIGNIFICANT CHANGE UP (ref 4.2–5.8)
RBC # FLD: 14.8 % — HIGH (ref 10.3–14.5)
RBC # FLD: 14.8 % — HIGH (ref 10.3–14.5)
RBC CASTS # UR COMP ASSIST: 54 /HPF — HIGH (ref 0–4)
RH IG SCN BLD-IMP: NEGATIVE — SIGNIFICANT CHANGE UP
SODIUM SERPL-SCNC: 136 MMOL/L — SIGNIFICANT CHANGE UP (ref 135–145)
SP GR SPEC: 1.01 — SIGNIFICANT CHANGE UP (ref 1.01–1.05)
SPECIMEN SOURCE: SIGNIFICANT CHANGE UP
UROBILINOGEN FLD QL: SIGNIFICANT CHANGE UP
WBC # BLD: 7.39 K/UL — SIGNIFICANT CHANGE UP (ref 3.8–10.5)
WBC # BLD: 8.21 K/UL — SIGNIFICANT CHANGE UP (ref 3.8–10.5)
WBC # FLD AUTO: 7.39 K/UL — SIGNIFICANT CHANGE UP (ref 3.8–10.5)
WBC # FLD AUTO: 8.21 K/UL — SIGNIFICANT CHANGE UP (ref 3.8–10.5)
WBC UR QL: 6 /HPF — HIGH (ref 0–5)

## 2022-11-17 PROCEDURE — 99233 SBSQ HOSP IP/OBS HIGH 50: CPT

## 2022-11-17 PROCEDURE — 50432 PLMT NEPHROSTOMY CATHETER: CPT | Mod: 50

## 2022-11-17 PROCEDURE — 99232 SBSQ HOSP IP/OBS MODERATE 35: CPT

## 2022-11-17 PROCEDURE — 49424 ASSESS CYST CONTRAST INJECT: CPT

## 2022-11-17 PROCEDURE — 76080 X-RAY EXAM OF FISTULA: CPT | Mod: 26

## 2022-11-17 RX ORDER — OXYCODONE HYDROCHLORIDE 5 MG/1
5 TABLET ORAL EVERY 6 HOURS
Refills: 0 | Status: DISCONTINUED | OUTPATIENT
Start: 2022-11-17 | End: 2022-11-21

## 2022-11-17 RX ORDER — FENTANYL CITRATE 50 UG/ML
50 INJECTION INTRAVENOUS
Refills: 0 | Status: DISCONTINUED | OUTPATIENT
Start: 2022-11-17 | End: 2022-11-17

## 2022-11-17 RX ORDER — OXYCODONE HYDROCHLORIDE 5 MG/1
5 TABLET ORAL EVERY 6 HOURS
Refills: 0 | Status: DISCONTINUED | OUTPATIENT
Start: 2022-11-17 | End: 2022-11-17

## 2022-11-17 RX ORDER — CEFTRIAXONE 500 MG/1
INJECTION, POWDER, FOR SOLUTION INTRAMUSCULAR; INTRAVENOUS
Refills: 0 | Status: DISCONTINUED | OUTPATIENT
Start: 2022-11-17 | End: 2022-11-17

## 2022-11-17 RX ORDER — CEFTRIAXONE 500 MG/1
2000 INJECTION, POWDER, FOR SOLUTION INTRAMUSCULAR; INTRAVENOUS EVERY 24 HOURS
Refills: 0 | Status: DISCONTINUED | OUTPATIENT
Start: 2022-11-17 | End: 2022-11-17

## 2022-11-17 RX ADMIN — BICTEGRAVIR SODIUM, EMTRICITABINE, AND TENOFOVIR ALAFENAMIDE FUMARATE 1 TABLET(S): 30; 120; 15 TABLET ORAL at 12:38

## 2022-11-17 RX ADMIN — OXYCODONE AND ACETAMINOPHEN 1 TABLET(S): 5; 325 TABLET ORAL at 12:38

## 2022-11-17 RX ADMIN — OLANZAPINE 10 MILLIGRAM(S): 15 TABLET, FILM COATED ORAL at 21:42

## 2022-11-17 RX ADMIN — Medication 50 MILLIGRAM(S): at 05:52

## 2022-11-17 RX ADMIN — FENTANYL CITRATE 50 MICROGRAM(S): 50 INJECTION INTRAVENOUS at 11:30

## 2022-11-17 RX ADMIN — CEFTRIAXONE 100 MILLIGRAM(S): 500 INJECTION, POWDER, FOR SOLUTION INTRAMUSCULAR; INTRAVENOUS at 11:40

## 2022-11-17 RX ADMIN — ATORVASTATIN CALCIUM 10 MILLIGRAM(S): 80 TABLET, FILM COATED ORAL at 21:43

## 2022-11-17 RX ADMIN — AMLODIPINE BESYLATE 10 MILLIGRAM(S): 2.5 TABLET ORAL at 05:54

## 2022-11-17 RX ADMIN — OXYCODONE HYDROCHLORIDE 5 MILLIGRAM(S): 5 TABLET ORAL at 18:23

## 2022-11-17 NOTE — CHART NOTE - NSCHARTNOTEFT_GEN_A_CORE
Pt c/o L flank pain following bilateral nephrostomy tubes placed today by IR. Pt states the pain is rated 8/10 on L flank and that the right side is not causing discomfort. Upon exam, VSS, R NT dressing C/D/I, no bleeding or hematoma, draining well with blood tinged urine in bag. L NT dressing C/D/I, site without bleeding or hematoma, no urine outpt in bag. Pt given pain control with no improvement. IR contacted and evaluated L NT, tubing was not attached correctly, adjusted and now clear urine draining well into bag. Re-evaluated Pt shortly after and he reports pain is unchanged, still 8/10. Discussed with Dr. Tran, recommend to check urgent non-con CT abd/pelvis and CBC/T+S ordered STAT, Pt aware and agrees to plan.

## 2022-11-17 NOTE — CHART NOTE - NSCHARTNOTEFT_GEN_A_CORE
IR called for Pt experiencing pain at left PCN site.     Pt seen at bedside and states he is having left flank pain 8/10 and has no urine draining into bag, states that has emptied right PCN bag twice since procedure. Focused physical exam - left PCN dressings clean/dry/intact, tender to palpation at the left flank. Upon examination the tubing to the drainage bag was incorrectly attached. After being corrected, it then began to drain clear urine into the bag. Pain possibly secondary to pressure from hydronephrosis. Would observe to see if pain decreases now that it is draining appropriately.     - Continue to monitor output  - Will follow

## 2022-11-17 NOTE — PROGRESS NOTE ADULT - SUBJECTIVE AND OBJECTIVE BOX
Mickie Cooper MD  [facility] Division of Hospital Medicine  Pager [phone] (M-F 8AM-5PM)  Other Times: [phone]    Patient is a 57y old  Male who presents with a chief complaint of Hematuria (2022 13:32)    SUBJECTIVE / OVERNIGHT EVENTS: no acute events overnight     MEDICATIONS  (STANDING):  amLODIPine   Tablet 10 milliGRAM(s) Oral daily  atorvastatin 10 milliGRAM(s) Oral at bedtime  bictegravir 50 mG/emtricitabine 200 mG/tenofovir alafenamide 25 mG (BIKTARVY) 1 Tablet(s) Oral daily  metoprolol succinate ER 50 milliGRAM(s) Oral daily  OLANZapine 10 milliGRAM(s) Oral at bedtime  senna 2 Tablet(s) Oral at bedtime    MEDICATIONS  (PRN):  acetaminophen     Tablet .. 650 milliGRAM(s) Oral every 6 hours PRN Temp greater or equal to 38C (100.4F), Mild Pain (1 - 3)  ALPRAZolam 1 milliGRAM(s) Oral three times a day PRN anxiety  oxycodone    5 mG/acetaminophen 325 mG 1 Tablet(s) Oral every 6 hours PRN Severe Pain (7 - 10)  zolpidem 5 milliGRAM(s) Oral at bedtime PRN Insomnia  zolpidem 5 milliGRAM(s) Oral at bedtime PRN Insomnia      PHYSICAL EXAM:  Vital Signs Last 24 Hrs  T(C): 36.6 (2022 06:00), Max: 37.8 (2022 12:26)  T(F): 97.9 (2022 06:00), Max: 100 (2022 12:26)  HR: 112 (2022 06:00) (100 - 112)  BP: 133/99 (2022 06:00) (115/91 - 134/97)  BP(mean): --  RR: 18 (2022 06:00) (18 - 18)  SpO2: 97% (2022 06:00) (97% - 98%)    Parameters below as of 2022 06:00  Patient On (Oxygen Delivery Method): room air        CONSTITUTIONAL: NAD, well-developed, well-groomed  RESPIRATORY: Normal respiratory effort; lungs are clear to auscultation bilaterally  CARDIOVASCULAR: Regular rate and rhythm, normal S1 and S2, no murmur/rub/gallop; No lower extremity edema  GASTROINTESTINAL: Nontender to palpation, normoactive bowel sounds, no rebound/guarding; No hepatosplenomegaly  MUSCULOSKELETAL:  no clubbing or cyanosis of digits; no joint swelling or tenderness to palpation  NEUROLOGY: non-focal; no gross sensory deficits   PSYCH: A+O to person, place, and time; affect appropriate  SKIN: No rashes; warm     LABS:                        11.6   7.39  )-----------( 316      ( 2022 06:43 )             35.3     11-17    136  |  101  |  15  ----------------------------<  98  3.7   |  24  |  1.42<H>    Ca    9.5      2022 06:43  Phos  4.1       Mg     2.10     -17      PT/INR - ( 2022 10:25 )   PT: 14.7 sec;   INR: 1.26 ratio         PTT - ( 2022 10:25 )  PTT:30.1 sec      Urinalysis Basic - ( 2022 06:43 )    Color: Light Yellow / Appearance: Clear / S.013 / pH: x  Gluc: x / Ketone: Negative  / Bili: Negative / Urobili: <2 mg/dL   Blood: x / Protein: 30 mg/dL / Nitrite: Negative   Leuk Esterase: Small / RBC: 54 /HPF / WBC 6 /HPF   Sq Epi: x / Non Sq Epi: 3 /HPF / Bacteria: Negative        Culture - Acid Fast - Urine (collected 15 Nov 2022 12:35)  Source: .Urine Urine        RADIOLOGY & ADDITIONAL TESTS:  Results Reviewed:   Imaging Personally Reviewed:  Electrocardiogram Personally Reviewed:    COORDINATION OF CARE:  Care Discussed with Consultants/Other Providers [Y/N]:  Prior or Outpatient Records Reviewed [Y/N]:

## 2022-11-17 NOTE — CHART NOTE - NSCHARTNOTEFT_GEN_A_CORE
IR will remove pelvic tube while patient here for nephrostomy tubes. CT with no remaining collection and no outputs from drain.

## 2022-11-17 NOTE — PROGRESS NOTE ADULT - PROBLEM SELECTOR PLAN 4
rectal/prostate cancer s/p radiation with perforation of recum/anus s/p abdominoperitoneal resection with end colostomy, and recently placed drain for thigh collection  - follows with colorectal surgery  - CTAP with no remaining fluid collection   - left thigh/pelvis drain with no output - reviewed by IR - IR to remove drain 11/17

## 2022-11-17 NOTE — CHART NOTE - NSCHARTNOTEFT_GEN_A_CORE
PRE-INTERVENTIONAL RADIOLOGY  PROCEDURE NOTE  Patient Age: 56yo   Patient Gender: M  Procedure (including site / side if known): b/l nephrostomy tubes and removal of left thigh/pelvis drain with no output   Diagnosis / Indication: moderate bilateral hydronephrosis with KATY and pyelonephritis s/p ABx  Interventional Radiology Attending Physician: Dr. Ramírez  Ordering Attending Physician: Dr. Tran  Pertinent medical history: HIV on HAART, rectal/prostate cancer s/p radiation with perforation of recum/anus s/p abdominoperitoneal resection with end colostomy, and recently placed drain for L thigh/pelvis collection p/w hematuria and KATY with pyelonephritis w/ b/l moderate hydronephrosis   Pertinent labs:                        11.6   7.39  )-----------( 316      ( 17 Nov 2022 06:43 )             35.3   11-17    136  |  101  |  15  ----------------------------<  98  3.7   |  24  |  1.42<H>    Ca    9.5      17 Nov 2022 06:43  Phos  4.1     11-17  Mg     2.10     11-17      Patient and Family aware? Yes

## 2022-11-17 NOTE — PROCEDURE NOTE - PROCEDURE FINDINGS AND DETAILS
Limited bilateral renal ultrasound shows moderate b/l hydronephrosis. 8.5 Fr left PCN placed and pigtail confirmed in the renal pelvis with contrast injection. 10.5 Fr right PCN placed and pigtail confirmed in the renal pelvis with contrast injection. Full report to follow.

## 2022-11-17 NOTE — PRE PROCEDURE NOTE - PRE PROCEDURE EVALUATION
Interventional Radiology Pre-Procedure Note    Procedure: bilateral percutaneous nephrostomy tube placement    Diagnosis/Indication: Patient is a 57y old  Male who presents with a chief complaint of Hematuria (17 Nov 2022 09:13) 2/2 radiation cystitis after RT for prostate ca with b/l hydronephrosis and KATY.       PAST MEDICAL & SURGICAL HISTORY:  HTN (hypertension)      HLD (hyperlipidemia)      HIV infection      Depressive disorder      Anxiety      Prostate cancer      Anal cancer      Diverticulosis      Lung cancer      Anal lesion           Allergies: No Known Allergies      LABS:                        11.6   7.39  )-----------( 316      ( 17 Nov 2022 06:43 )             35.3     11-17    136  |  101  |  15  ----------------------------<  98  3.7   |  24  |  1.42<H>    Ca    9.5      17 Nov 2022 06:43  Phos  4.1     11-17  Mg     2.10     11-17      PT/INR - ( 16 Nov 2022 10:25 )   PT: 14.7 sec;   INR: 1.26 ratio         PTT - ( 16 Nov 2022 10:25 )  PTT:30.1 sec    Procedure/ risks/ benefits were explained, informed consent obtained from patient, verbalizes understanding.

## 2022-11-17 NOTE — PROGRESS NOTE ADULT - SUBJECTIVE AND OBJECTIVE BOX
Follow Up:  HIV, hematuria, hydro    Interval History/ROS: pt has been off antibiotics, afebrile, no dysuria or hematuria, no vomiting, s/p nephrostomy today, has pain at the L side          Allergies  No Known Allergies        ANTIMICROBIALS:  bictegravir 50 mG/emtricitabine 200 mG/tenofovir alafenamide 25 mG (BIKTARVY) 1 daily      OTHER MEDS:  acetaminophen     Tablet .. 650 milliGRAM(s) Oral every 6 hours PRN  ALPRAZolam 1 milliGRAM(s) Oral three times a day PRN  amLODIPine   Tablet 10 milliGRAM(s) Oral daily  atorvastatin 10 milliGRAM(s) Oral at bedtime  metoprolol succinate ER 50 milliGRAM(s) Oral daily  OLANZapine 10 milliGRAM(s) Oral at bedtime  oxycodone    5 mG/acetaminophen 325 mG 1 Tablet(s) Oral every 6 hours PRN  senna 2 Tablet(s) Oral at bedtime  zolpidem 5 milliGRAM(s) Oral at bedtime PRN  zolpidem 5 milliGRAM(s) Oral at bedtime PRN      Vital Signs Last 24 Hrs  T(C): 36.9 (2022 12:20), Max: 36.9 (2022 12:20)  T(F): 98.4 (2022 12:20), Max: 98.4 (2022 12:20)  HR: 94 (2022 12:20) (94 - 112)  BP: 136/89 (2022 12:20) (133/99 - 136/89)  BP(mean): --  RR: 16 (2022 12:20) (16 - 18)  SpO2: 100% (2022 12:20) (97% - 100%)    Parameters below as of 2022 12:20  Patient On (Oxygen Delivery Method): room air        Physical Exam:  General:    NAD, non toxic  Respiratory:   comfortable on RA  abd:   soft, BS +, not tender, ostomy  :   s/p b/l nephrostomy  Musculoskeletal : no joint swelling, no edema  Skin:    no rash  vascular: no phlebitis                          11.6   7.39  )-----------( 316      ( 2022 06:43 )             35.3       11-17    136  |  101  |  15  ----------------------------<  98  3.7   |  24  |  1.42<H>    Ca    9.5      2022 06:43  Phos  4.1       Mg     2.10             Urinalysis Basic - ( 2022 06:43 )    Color: Light Yellow / Appearance: Clear / S.013 / pH: x  Gluc: x / Ketone: Negative  / Bili: Negative / Urobili: <2 mg/dL   Blood: x / Protein: 30 mg/dL / Nitrite: Negative   Leuk Esterase: Small / RBC: 54 /HPF / WBC 6 /HPF   Sq Epi: x / Non Sq Epi: 3 /HPF / Bacteria: Negative        MICROBIOLOGY:  v  .Urine Urine  11-15-22 --  --  --      Clean Catch Clean Catch (Midstream)  22   <10,000 CFU/mL Normal Urogenital Genet  --  --        HIV-1 RNA Quantitative, Viral Load Log: NOT DET. lg /mL (10-20-22 @ 14:47)  HIV-1 RNA Quantitative, Viral Load Log: DET.  <1.47 lg /mL (22 @ 15:23)  HIV-1 RNA Quantitative, Viral Load Log: NOT DET. lg /mL (22 @ 12:31)          RADIOLOGY:  Images independently visualized and reviewed personally, findings as below  < from: US Renal (22 @ 08:35) >  IMPRESSION:  Moderate bilateral hydronephrosis, unchanged.      < end of copied text >  < from: CT Abdomen and Pelvis w/ IV Cont (22 @ 19:05) >  IMPRESSION:  1.  Status post APR with vertical rectus abdominis musculocutaneous flap   reconstruction. Previous pelvic presacral collection is collapsed around   the left transgluteal pigtail drain. No definite fistulization is seen.    2.  Bladder wall thickening, bilateral urothelial enhancement and   increased perinephric inflammation, suggestive of urinary tract   infection/pyelonephritis. The progressed inflammatory changes surrounding   the bilateral mid ureters causing worsening moderate bilateral   hydroureteronephrosis.    < end of copied text >  Limited bilateral renal ultrasound shows moderate b/l hydronephrosis. 8.5 Fr left PCN placed and pigtail confirmed in the renal pelvis with contrast injection. 10.5 Fr right PCN placed and pigtail confirmed in the renal pelvis with contrast injection

## 2022-11-17 NOTE — PROGRESS NOTE ADULT - ASSESSMENT
57 m with HTN, HIV Biktarvy, prostate ca 2016 s/p radiation, anal cancer (dx 4/2021) s/p radiation and chemotherapy (last 8/2021) complicated by an ulcer, abscess and rectal bleeding  s/p loop colostomy 3/30/22 and then s/p APR with end colostomy creation, VRAM flap closure, 8/24 found to have a large abscess as well, abscess cx with clostridium, bacteroides   again was admitted for GIB and pelvic/groin abscess s/p drain placement and CT also showed L ischial tuberosity osteo so pt received zosyn in the hospital and completed with PO levo and flagyl to finish a 6 week course, still has the thigh IR drain  now p/w hematuria but denied fever, dysuria or flank pain  here afebrile, WBC normal and  u/a with hematuria  KATY with Cr: 1.38  abd/pelvis CT:  Status post APR with vertical rectus abdominis musculocutaneous flap reconstruction. Previous pelvic presacral collection is collapsed around the left transgluteal pigtail drain.   Bladder wall thickening, bilateral urothelial enhancement and increased perinephric inflammation, suggestive of urinary tract infection/pyelonephritis. The progressed inflammatory changes surrounding the bilateral mid ureters causing worsening moderate bilateral hydroureteronephrosis.  urine cx negative    HIV controlled on Biktarvy, last CD4: 511 and VL ND, prostate ca s/p RT, anal ca s/p chemo and RT c/b anal ulcer, abscess s/p loop colostomy 3/30/22 and then APR with end colostomy creation, VRAM flap closure, 8/24 found to have a large abscess as well, abscess cx with clostridium, bacteroides, s/p IR drain still in place but abscess resolved, also ischial osteo s/p 6 weeks of antibiotics  now with hematuria, KATY and evidence of bladder and urothelial enhancement but urine cx negative and no fever or WBC, likely non infectious and ?radiation induced    b/l hydro s/p nephrostomy 11/17  * f/u the nephrostomy cx  * urology raised concern for TB but pt had negative quantiferon before and also now so unlikely and also afebrile, f/u the urine AFB   * s/p 4 days of ceftriaxone and discontinued as the urine cx was negative  * f/u with urology    The above assessment and plan was discussed with the primary team    Denise Gordon MD  contact on teams  After 5pm and on weekends call [phone]

## 2022-11-18 ENCOUNTER — TRANSCRIPTION ENCOUNTER (OUTPATIENT)
Age: 57
End: 2022-11-18

## 2022-11-18 ENCOUNTER — APPOINTMENT (OUTPATIENT)
Dept: CT IMAGING | Facility: HOSPITAL | Age: 57
End: 2022-11-18

## 2022-11-18 DIAGNOSIS — Z46.82 ENCOUNTER FOR FITTING AND ADJUSTMENT OF NON-VASCULAR CATHETER: ICD-10-CM

## 2022-11-18 DIAGNOSIS — N13.30 UNSPECIFIED HYDRONEPHROSIS: ICD-10-CM

## 2022-11-18 LAB
ANION GAP SERPL CALC-SCNC: 15 MMOL/L — HIGH (ref 7–14)
BUN SERPL-MCNC: 14 MG/DL — SIGNIFICANT CHANGE UP (ref 7–23)
CALCIUM SERPL-MCNC: 9.7 MG/DL — SIGNIFICANT CHANGE UP (ref 8.4–10.5)
CHLORIDE SERPL-SCNC: 98 MMOL/L — SIGNIFICANT CHANGE UP (ref 98–107)
CO2 SERPL-SCNC: 24 MMOL/L — SIGNIFICANT CHANGE UP (ref 22–31)
CREAT SERPL-MCNC: 1.05 MG/DL — SIGNIFICANT CHANGE UP (ref 0.5–1.3)
CULTURE RESULTS: NO GROWTH — SIGNIFICANT CHANGE UP
CULTURE RESULTS: NO GROWTH — SIGNIFICANT CHANGE UP
EGFR: 83 ML/MIN/1.73M2 — SIGNIFICANT CHANGE UP
GLUCOSE SERPL-MCNC: 98 MG/DL — SIGNIFICANT CHANGE UP (ref 70–99)
HCT VFR BLD CALC: 37.5 % — LOW (ref 39–50)
HGB BLD-MCNC: 12.2 G/DL — LOW (ref 13–17)
MAGNESIUM SERPL-MCNC: 1.9 MG/DL — SIGNIFICANT CHANGE UP (ref 1.6–2.6)
MCHC RBC-ENTMCNC: 27.7 PG — SIGNIFICANT CHANGE UP (ref 27–34)
MCHC RBC-ENTMCNC: 32.5 GM/DL — SIGNIFICANT CHANGE UP (ref 32–36)
MCV RBC AUTO: 85 FL — SIGNIFICANT CHANGE UP (ref 80–100)
NRBC # BLD: 0 /100 WBCS — SIGNIFICANT CHANGE UP (ref 0–0)
NRBC # FLD: 0 K/UL — SIGNIFICANT CHANGE UP (ref 0–0)
PHOSPHATE SERPL-MCNC: 4.3 MG/DL — SIGNIFICANT CHANGE UP (ref 2.5–4.5)
PLATELET # BLD AUTO: 340 K/UL — SIGNIFICANT CHANGE UP (ref 150–400)
POTASSIUM SERPL-MCNC: 3.4 MMOL/L — LOW (ref 3.5–5.3)
POTASSIUM SERPL-SCNC: 3.4 MMOL/L — LOW (ref 3.5–5.3)
RBC # BLD: 4.41 M/UL — SIGNIFICANT CHANGE UP (ref 4.2–5.8)
RBC # FLD: 14.7 % — HIGH (ref 10.3–14.5)
SODIUM SERPL-SCNC: 137 MMOL/L — SIGNIFICANT CHANGE UP (ref 135–145)
SPECIMEN SOURCE: SIGNIFICANT CHANGE UP
SPECIMEN SOURCE: SIGNIFICANT CHANGE UP
WBC # BLD: 6.35 K/UL — SIGNIFICANT CHANGE UP (ref 3.8–10.5)
WBC # FLD AUTO: 6.35 K/UL — SIGNIFICANT CHANGE UP (ref 3.8–10.5)

## 2022-11-18 PROCEDURE — 99233 SBSQ HOSP IP/OBS HIGH 50: CPT

## 2022-11-18 PROCEDURE — 74176 CT ABD & PELVIS W/O CONTRAST: CPT | Mod: 26

## 2022-11-18 PROCEDURE — 99232 SBSQ HOSP IP/OBS MODERATE 35: CPT

## 2022-11-18 RX ORDER — POTASSIUM CHLORIDE 20 MEQ
20 PACKET (EA) ORAL ONCE
Refills: 0 | Status: COMPLETED | OUTPATIENT
Start: 2022-11-18 | End: 2022-11-18

## 2022-11-18 RX ADMIN — Medication 50 MILLIGRAM(S): at 05:56

## 2022-11-18 RX ADMIN — ATORVASTATIN CALCIUM 10 MILLIGRAM(S): 80 TABLET, FILM COATED ORAL at 22:18

## 2022-11-18 RX ADMIN — BICTEGRAVIR SODIUM, EMTRICITABINE, AND TENOFOVIR ALAFENAMIDE FUMARATE 1 TABLET(S): 30; 120; 15 TABLET ORAL at 11:42

## 2022-11-18 RX ADMIN — OXYCODONE HYDROCHLORIDE 5 MILLIGRAM(S): 5 TABLET ORAL at 05:56

## 2022-11-18 RX ADMIN — Medication 20 MILLIEQUIVALENT(S): at 09:03

## 2022-11-18 RX ADMIN — OXYCODONE HYDROCHLORIDE 5 MILLIGRAM(S): 5 TABLET ORAL at 06:56

## 2022-11-18 RX ADMIN — OLANZAPINE 10 MILLIGRAM(S): 15 TABLET, FILM COATED ORAL at 22:18

## 2022-11-18 RX ADMIN — ZOLPIDEM TARTRATE 5 MILLIGRAM(S): 10 TABLET ORAL at 22:20

## 2022-11-18 RX ADMIN — AMLODIPINE BESYLATE 10 MILLIGRAM(S): 2.5 TABLET ORAL at 06:01

## 2022-11-18 NOTE — DISCHARGE NOTE PROVIDER - NSDCFUADDINST_GEN_ALL_CORE_FT
See above.     For new or worsening symptoms, contact your physician or return to the hospital.  - Flush bilateral nephrostomy tubes with 5cc of normal saline daily. For new or worsening symptoms, contact your physician or return to the hospital.

## 2022-11-18 NOTE — DISCHARGE NOTE PROVIDER - CARE PROVIDER_API CALL
Denise Gordon)  Internal Medicine  [address] [place] [address]  Phone: [phone]  Fax: [phone]  Follow Up Time:

## 2022-11-18 NOTE — DISCHARGE NOTE PROVIDER - NSDCMRMEDTOKEN_GEN_ALL_CORE_FT
ALPRAZolam 1 mg oral tablet: 1 tab(s) orally 3 times a day, As Needed  amLODIPine 5 mg oral tablet: 1 tab(s) orally once a day  aspirin 81 mg oral tablet: orally once a day  atorvastatin 10 mg oral tablet: 1 tab(s) orally once a day  bictegravir/emtricitabine/tenofovir 50 mg-200 mg-25 mg oral tablet: 1 tab(s) orally once a day  metoprolol succinate 50 mg oral tablet, extended release: 1 tab(s) orally once a day  OLANZapine 10 mg oral tablet: 1 tab(s) orally once a day (at bedtime)  zolpidem 10 mg oral tablet: 1 tab(s) orally once a day (at bedtime)   ALPRAZolam 1 mg oral tablet: 1 tab(s) orally 3 times a day, As Needed  amLODIPine 5 mg oral tablet: 1 tab(s) orally once a day  aspirin 81 mg oral tablet: orally once a day  atorvastatin 10 mg oral tablet: 1 tab(s) orally once a day  bictegravir/emtricitabine/tenofovir 50 mg-200 mg-25 mg oral tablet: 1 tab(s) orally once a day  metoprolol succinate 50 mg oral tablet, extended release: 1 tab(s) orally once a day  OLANZapine 10 mg oral tablet: 1 tab(s) orally once a day (at bedtime)  tamsulosin 0.4 mg oral capsule: 1 cap(s) orally once a day (at bedtime)  zolpidem 10 mg oral tablet: 1 tab(s) orally once a day (at bedtime)   ALPRAZolam 1 mg oral tablet: 1 tab(s) orally 3 times a day, As Needed  amLODIPine 5 mg oral tablet: 1 tab(s) orally once a day  atorvastatin 10 mg oral tablet: 1 tab(s) orally once a day  bictegravir/emtricitabine/tenofovir 50 mg-200 mg-25 mg oral tablet: 1 tab(s) orally once a day  metoprolol succinate 50 mg oral tablet, extended release: 1 tab(s) orally once a day  OLANZapine 10 mg oral tablet: 1 tab(s) orally once a day (at bedtime)  tamsulosin 0.4 mg oral capsule: 1 cap(s) orally once a day (at bedtime)  zolpidem 10 mg oral tablet: 1 tab(s) orally once a day (at bedtime)

## 2022-11-18 NOTE — PROGRESS NOTE ADULT - PROBLEM SELECTOR PLAN 4
rectal/prostate cancer s/p radiation with perforation of recum/anus s/p abdominoperitoneal resection with end colostomy, and recently placed drain for thigh collection  - follows with colorectal surgery  - CTAP with no remaining fluid collection   - left thigh/pelvis drain with no output - reviewed by IR - removed drain 11/17

## 2022-11-18 NOTE — DISCHARGE NOTE PROVIDER - PROVIDER TOKENS
What Type Of Note Output Would You Prefer (Optional)?: Standard Output
What Is The Reason For Today's Visit?: Full Body Skin Examination
PROVIDER:[TOKEN:[[id number]]]

## 2022-11-18 NOTE — DISCHARGE NOTE PROVIDER - NSDCFUADDAPPT_GEN_ALL_CORE_FT
Follow up at the [facility] for Urology- when you call, say you were discharged from [facility] with nephrostomy tubes and need to be seen within a week.   [address] [place], [place] [address] [phone] Follow up at the [facility] for [facility]- when you call, say you were discharged from [facility] with nephrostomy tubes and need to be seen within a week.   [address] [place] [address]   [phone]    Follow up with the interventional radiologist toshia for Routine 3 month exchange of nephrostomy tubes. Outpatient IR office # [phone]   Follow up at the [facility] for Urology- when you call, say you were discharged from [facility] with nephrostomy tubes and need to be seen within a week.   [address] [place] [address]   [phone]    Follow up with the interventional radiologist toshia for Routine 3 month exchange of nephrostomy tubes. Outpatient IR office # [phone]    Follow up with Dr. Gordon as regularly scheduled    Follow up at the [facility] for Urology- when you call, say you were discharged from [facility] with nephrostomy tubes and need to be seen within a week.   [address] [place] [address]   [phone]    Follow up with the interventional radiologist office for Routine 3 month exchange of nephrostomy tubes. Outpatient IR office # [phone]    Follow up with Dr. Gordon as regularly scheduled

## 2022-11-18 NOTE — DISCHARGE NOTE PROVIDER - NSDCFUSCHEDAPPT_GEN_ALL_CORE_FT
[facility]  CATSCAN [address] OP 7  Scheduled Appointment: 11/18/2022    Angel Betancourt  [facility]  COLOSURG [address] [facility]  Scheduled Appointment: 11/22/2022    German Riggs  [facility]  PLASTICSUR [address] [facility]  Scheduled Appointment: 01/05/2023    Denise Gordon  [facility]  INFDISEASE [address]  Scheduled Appointment: 01/23/2023     Angel Betancourt  [facility] Physician [facility]  COLOSURG 900 [facility]  Scheduled Appointment: 11/22/2022    German Riggs  [facility] Physician [facility]  PLASTICSUR 600 [facility]  Scheduled Appointment: 01/05/2023    Denise Gordon  [facility]  INFDISEASE [address]  Scheduled Appointment: 01/23/2023     German Riggs  [facility] Physician [facility]  PLASTICSUR [address] [facility]  Scheduled Appointment: 01/05/2023    Denise Gordon  [facility]  INFDISEASE [address]  Scheduled Appointment: 01/23/2023

## 2022-11-18 NOTE — PROGRESS NOTE ADULT - PROBLEM SELECTOR PLAN 1
Imaging with bilateral hydronephrosis  - presented with hematuria now resolved ?radiation cystitis  - was initially on abx, but culture negative, per ID can dc abx  - appreciate uro/IR recs - s/p bilateral NT placement with IR 11/17  - flank pain post IR procedure, Hb stable, f/u CTAP non con  - monitor creatinine, I/Os

## 2022-11-18 NOTE — PROGRESS NOTE ADULT - SUBJECTIVE AND OBJECTIVE BOX
57y Male s/p bilateral percutaneous nephrostomy tube placement on 11/17 in Interventional Radiology.     Patient seen and examined bedside resting comfortably. Pt endorses left flank pain, pain controlled with oxycodone.     T(F): 98.5 (11-18-22 @ 11:30), Max: 98.5 (11-17-22 @ 21:37)  HR: 111 (11-18-22 @ 11:30) (100 - 111)  BP: 129/94 (11-18-22 @ 11:30) (129/94 - 147/99)  RR: 17 (11-18-22 @ 11:30) (17 - 18)  SpO2: 97% (11-18-22 @ 11:30) (96% - 98%)  Wt(kg): --    LABS:                        12.2   6.35  )-----------( 340      ( 18 Nov 2022 04:58 )             37.5     11-18    137  |  98  |  14  ----------------------------<  98  3.4<L>   |  24  |  1.05    Ca    9.7      18 Nov 2022 04:58  Phos  4.3     11-18  Mg     1.90     11-18        I&O's Detail    17 Nov 2022 07:01  -  18 Nov 2022 07:00  --------------------------------------------------------  IN:    Oral Fluid: 200 mL  Total IN: 200 mL    OUT:    Nephrostomy Tube (mL): 775 mL    Nephrostomy Tube (mL): 475 mL    Voided (mL): 300 mL  Total OUT: 1550 mL    Total NET: -1350 mL      18 Nov 2022 07:01  -  18 Nov 2022 12:24  --------------------------------------------------------  IN:  Total IN: 0 mL    OUT:    Nephrostomy Tube (mL): 225 mL    Nephrostomy Tube (mL): 375 mL  Total OUT: 600 mL    Total NET: -600 mL      PHYSICAL EXAM:  General: NAD, resting comfortably in bed  Flank: B/L PCN dressing are C/D/I. No pain upon palpation of b/l sites.

## 2022-11-18 NOTE — DIETITIAN INITIAL EVALUATION ADULT - OTHER CALCULATIONS
Ideal Body Weight: 172 lbs / 78.2 kg +/-10%   Weight history, per HIE: (11/12 dosing) 182.9 lbs, (10/20) 195 lbs, (9/16) 196 lbs, (8/2) 189 lbs, (4/13) 215 lbs, (1/12) 220 lbs.

## 2022-11-18 NOTE — PROGRESS NOTE ADULT - SUBJECTIVE AND OBJECTIVE BOX
eMERGENCY dEPARTMENT eNCOUnter      CHIEF COMPLAINT    Chief Complaint   Patient presents with   • Back Pain   • Shoulder Problem   • Neck Pain       HPI    Kayla Boston is a 74 year old female who presents with left-sided neck pain that's worsened for the past 3 weeks. It occurred around New Year's Alison. She thought she may have pulled something. It has been intermittent but worse in the last week. She was seen at [facility] and was started on Naprosyn on Tuesday. It has slightly improved. She's been taking Tylenol. It seemed to get worse today. It starts sharp shooting pain in the left neck and radiates down the arm. No weakness or numbness. She hasn't had any imaging done. No trauma or fall. He denies any chest pain. No swelling to the arm. No anterior chest pain or shortness of breath. No cardiac problems in the past. She called [facility] and she came here for further evaluation since the pain was not improving. No rash.    ALLERGIES    ALLERGIES:  No Known Allergies    CURRENT MEDICATIONS    No current facility-administered medications for this encounter.      Current Outpatient Prescriptions   Medication Sig Dispense Refill   • methylPREDNISolone (MEDROL DOSEPAK) 4 MG tablet follow package directions 21 tablet 0   • traMADol (ULTRAM) 50 MG tablet Take 1 tablet by mouth every 4 hours as needed for Pain. 10 tablet 0   • cyclobenzaprine (FLEXERIL) 10 MG tablet Take 1 tablet by mouth 3 times daily as needed for Muscle spasms. 15 tablet 0   • lisinopril (PRINIVIL,ZESTRIL) 30 MG tablet Take 1 tablet by mouth daily. 90 tablet 4   • simvastatin (ZOCOR) 40 MG tablet Take 1 tablet by mouth daily. 90 tablet 4   • BABY ASPIRIN PO      • Calcium Carbonate-Vitamin D (CALCIUM + D PO)      • Lutein 20 MG TABS Take 1 tablet by mouth daily.     • cholecalciferol (VITAMIN D3) 1000 UNITS tablet Take 1,000 Units by mouth daily.     • MULTIPLE VITAMIN PO Take  by mouth.         PAST MEDICAL HISTORY    Past Medical History:    Diagnosis Date   • Diverticulosis of colon 11/14/2016   • History of colon polyps 11/14/2016    Colonoscopy 11/14/2016. Normal exam. Follow-up in 5-7 years.   • HTN (hypertension)    • Hyperlipidemia        SURGICAL HISTORY    Past Surgical History:   Procedure Laterality Date   • BREAST SURGERY     • COLONOSCOPY  11/14/2016    repeat 5 to 7 years-Felicia   • COLONOSCOPY DIAGNOSTIC  08/24/2005    Colonoscopy, Dx   • COLONOSCOPY W/ POLYPECTOMY  05/05/2010       SOCIAL HISTORY    Social History     Social History   • Marital status:      Spouse name: N/A   • Number of children: N/A   • Years of education: N/A     Social History Main Topics   • Smoking status: Never Smoker   • Smokeless tobacco: Never Used   • Alcohol use No   • Drug use: No   • Sexual activity: Not Asked     Other Topics Concern   • None     Social History Narrative   • None       FAMILY HISTORY    Family History   Problem Relation Age of Onset   • Heart failure Mother    • Hypertension Mother    • Breast cancer Sister        REVIEW OF SYSTEMS    See history of present illness but remainder review of systems negative  Constitutional:  Denies fever or chills.   Eyes:  Denies change in visual acuity.   HENT:  See HPI   Respiratory:  Denies cough or shortness of breath.   Cardiovascular:  Denies chest pain or edema.   GI:  Denies abdominal pain, nausea, vomiting, bloody stools or diarrhea.   Musculoskeletal:  See HPI   Integument:  Denies rash.   Neurologic:  See HPI     PHYSICAL EXAM    ED Triage Vitals   BP    Pulse    Resp    Temp    SpO2      Pulse Ox Interpretation: 95%  Constitutional:  Well developed, well nourished. No acute distress, non-toxic appearance.   Eyes:  PERRL, conjunctivae normal.   HENT:  Normocephalic. Atraumatic. Normal nares. Normal pharynx.  Neck: Patient has point tenderness in the mid C-spine around C4-C5. She does have some period spinal tenderness on the left side. It is otherwise supple. No step-off deformity. She has  good carotid pulses. No rashes.  Respiratory:  No respiratory distress, normal breath sounds. No rales. No wheezing.   Cardiovascular:  Normal rate, normal rhythm. No murmurs, gallops, or rubs.  Musculoskeletal:  No other spinal tenderness in the thoracic or lumbar spine. No other edema or swelling in the lower or upper extremities. Good pulses in upper and lower extremity is noted.   Integument:  Well hydrated, no rash.   Neurologic:  Alert awake oriented ×3. Cranial nerves II-12 intact. Motor is 5 out of 5 in upper and lower extremity. Sensory intact to light touch Babinski's downgoing.    EKG    EKG INTERPRETATION:  Time performed: 11:25 AM  Interpreted by: Dr. Edilberto Rdz  Rhythm: Normal sinus   Rate: 80-90 / min  Axis: Normal  Ectopy: None  Conduction: Normal  ST Segments: No acute change  T Waves: No acute changes  Q Waves: None    Clinical Impression: Normal Sinus Rhythm      RADIOLOGY    Imaging Results          XR Cervical Spine 4-5 Views (Final result)  Result time 01/12/18 12:33:15    Final result                 Impression:    IMPRESSION:     1. Disc space degenerative changes with some uncovertebral osteophyte  minimal narrowing of exit foramina as described. No fracture or  subluxation.  2. Mild straightening of normal cervical lordosis.               Narrative:    XR CERVICAL SPINE 4-5 VIEWS    INDICATION:  Left-sided cervical radiculopathy    COMPARISON:  None.    FINDINGS:   There is some disc degenerative disease with disc space narrowing at the  C5-6 level and minimally at the C6-7 level. Oblique views show slight exit  foraminal narrowing by uncovertebral osteophytes on the right at C5 and on  the left at C5 and C6. There is adequate room for the nerve roots exit.    There are mild facet degenerative changes. These are noted on the left at  C4-5 and C5-6.    No fractures seen. No subluxation is noted. No bone destructive lesions are  seen. Prevertebral soft tissues are normal.                                   LABS    none    PROCEDURES    none    ED COURSE & MEDICAL DECISION MAKING    Pt presents with left-sided neck pain that radiate down left arm. Likely it's more cervical radiculopathy. She likely has more inflammation and spasm. She's been taking Naprosyn. No other cardiac complaints. EKG was unremarkable. I did order cervical spine x-ray. I ordered Ultram and Flexeril to get her more comfortable. Neurologic she is intact. Patient agrees and understands plan.    ED Course/MDM:    1:16 PM    Patient is feeling better. Cervical spine x-rays revealed mostly DJD. No acute fracture. Likely this more cervical ectopic. She will likely benefit from Medrol Dosepak for anti-inflammatory. She continue Naprosyn. I will also prescribe Ultram and Flexeril. I did send her prescriptions to [facility]. I sent it electronically. She will follow-up with her PMD for recheck in one week. She may need further imaging later. I recommended no lifting with her left arm weakness to decrease symptoms. Patient agrees and understands plan. Patient discharged.    Diagnosis  The encounter diagnosis was Cervical radiculopathy.    Follow Up:  Raul Clarke MD  [address] [place] [address]  [phone]    Schedule an appointment as soon as possible for a visit in 1 week  For reevaluation, If symptoms worsen       New Prescriptions    CYCLOBENZAPRINE (FLEXERIL) 10 MG TABLET    Take 1 tablet by mouth 3 times daily as needed for Muscle spasms.    METHYLPREDNISOLONE (MEDROL DOSEPAK) 4 MG TABLET    follow package directions    TRAMADOL (ULTRAM) 50 MG TABLET    Take 1 tablet by mouth every 4 hours as needed for Pain.       Pt is discharged to home/self care in stable condition.     FINAL IMPRESSION    ED Diagnosis        Final diagnosis    Cervical radiculopathy                Edilberto Rdz MD  01/12/18 [id number]       Follow Up:  HIV, hematuria, hydro    Interval History/ROS: pt still has pain at the nephrostomy site and some hematuria but no fever, vomiting, cough          Allergies  No Known Allergies        ANTIMICROBIALS:  bictegravir 50 mG/emtricitabine 200 mG/tenofovir alafenamide 25 mG (BIKTARVY) 1 daily      OTHER MEDS:  acetaminophen     Tablet .. 650 milliGRAM(s) Oral every 6 hours PRN  ALPRAZolam 1 milliGRAM(s) Oral three times a day PRN  amLODIPine   Tablet 10 milliGRAM(s) Oral daily  atorvastatin 10 milliGRAM(s) Oral at bedtime  metoprolol succinate ER 50 milliGRAM(s) Oral daily  OLANZapine 10 milliGRAM(s) Oral at bedtime  oxyCODONE    IR 5 milliGRAM(s) Oral every 6 hours PRN  senna 2 Tablet(s) Oral at bedtime  zolpidem 5 milliGRAM(s) Oral at bedtime PRN  zolpidem 5 milliGRAM(s) Oral at bedtime PRN      Vital Signs Last 24 Hrs  T(C): 36.9 (2022 11:30), Max: 36.9 (2022 21:37)  T(F): 98.5 (2022 11:30), Max: 98.5 (2022 21:37)  HR: 111 (2022 11:30) (100 - 111)  BP: 129/94 (2022 11:30) (129/94 - 147/99)  BP(mean): --  RR: 17 (2022 11:30) (17 - 18)  SpO2: 97% (2022 11:30) (96% - 98%)    Parameters below as of 2022 11:30  Patient On (Oxygen Delivery Method): room air        Physical Exam:  General:    NAD, non toxic  Respiratory:   comfortable on RA  abd:   soft, BS +, not tender, ostomy  :   s/p b/l nephrostomy with some tenderness at both sites   Musculoskeletal : no joint swelling, no edema  Skin:    no rash  vascular: no phlebitis                        12.2   6.35  )-----------( 340      ( 2022 04:58 )             37.5       18    137  |  98  |  14  ----------------------------<  98  3.4<L>   |  24  |  1.05    Ca    9.7      2022 04:58  Phos  4.3       Mg     1.90             Urinalysis Basic - ( 2022 06:43 )    Color: Light Yellow / Appearance: Clear / S.013 / pH: x  Gluc: x / Ketone: Negative  / Bili: Negative / Urobili: <2 mg/dL   Blood: x / Protein: 30 mg/dL / Nitrite: Negative   Leuk Esterase: Small / RBC: 54 /HPF / WBC 6 /HPF   Sq Epi: x / Non Sq Epi: 3 /HPF / Bacteria: Negative        MICROBIOLOGY:  v  .Other RT NEPHROSTOMY TUBE  22   Testing in progress  --  --      .Urine Urine  11-15-22 --  --  --      Clean Catch Clean Catch (Midstream)  22   <10,000 CFU/mL Normal Urogenital Genet  --  --        HIV-1 RNA Quantitative, Viral Load Log: NOT DET. lg /mL (10-20-22 @ 14:47)  HIV-1 RNA Quantitative, Viral Load Log: DET.  <1.47 lg /mL (22 @ 15:23)  HIV-1 RNA Quantitative, Viral Load Log: NOT DET. lg /mL (22 @ 12:31)          RADIOLOGY:  Images independently visualized and reviewed personally, findings as below  < from: CT Abdomen and Pelvis No Cont (22 @ 13:32) >  IMPRESSION:  New bilateral nephrostomy tubes in appropriate position. No evidence of   perinephric fluid collection.    Resolution of bilateral hydronephrosis present on prior examination   2022.    Mild right hydroureter with 1 to 2 mm stone at the UVJ.    < end of copied text >

## 2022-11-18 NOTE — PROGRESS NOTE ADULT - SUBJECTIVE AND OBJECTIVE BOX
Mickie Cooper MD  [facility] Division of Hospital Medicine  Pager [phone] (M-F 8AM-5PM)  Other Times: [phone]    Patient is a 57y old  Male who presents with a chief complaint of Hematuria (2022 05:23)    SUBJECTIVE / OVERNIGHT EVENTS: complaining of left flank pain after IR procedure     MEDICATIONS  (STANDING):  amLODIPine   Tablet 10 milliGRAM(s) Oral daily  atorvastatin 10 milliGRAM(s) Oral at bedtime  bictegravir 50 mG/emtricitabine 200 mG/tenofovir alafenamide 25 mG (BIKTARVY) 1 Tablet(s) Oral daily  metoprolol succinate ER 50 milliGRAM(s) Oral daily  OLANZapine 10 milliGRAM(s) Oral at bedtime  potassium chloride    Tablet ER 20 milliEquivalent(s) Oral once  senna 2 Tablet(s) Oral at bedtime    MEDICATIONS  (PRN):  acetaminophen     Tablet .. 650 milliGRAM(s) Oral every 6 hours PRN Temp greater or equal to 38C (100.4F), Mild Pain (1 - 3)  ALPRAZolam 1 milliGRAM(s) Oral three times a day PRN anxiety  oxyCODONE    IR 5 milliGRAM(s) Oral every 6 hours PRN Moderate to Severe Pain  zolpidem 5 milliGRAM(s) Oral at bedtime PRN Insomnia  zolpidem 5 milliGRAM(s) Oral at bedtime PRN Insomnia      PHYSICAL EXAM:  Vital Signs Last 24 Hrs  T(C): 36.9 (2022 05:45), Max: 36.9 (2022 12:20)  T(F): 98.5 (2022 05:45), Max: 98.5 (2022 21:37)  HR: 100 (2022 05:45) (94 - 105)  BP: 147/99 (2022 05:45) (136/89 - 147/99)  RR: 18 (2022 05:45) (16 - 18)  SpO2: 96% (2022 05:45) (96% - 100%)    Parameters below as of 2022 05:45  Patient On (Oxygen Delivery Method): room air        CONSTITUTIONAL: NAD, well-developed, well-groomed  RESPIRATORY: Normal respiratory effort; lungs are clear to auscultation bilaterally  CARDIOVASCULAR: Regular rate and rhythm, normal S1 and S2, no murmur/rub/gallop; No lower extremity edema  GASTROINTESTINAL: Nontender to palpation, normoactive bowel sounds, no rebound/guarding; No hepatosplenomegaly  MUSCULOSKELETAL:  no clubbing or cyanosis of digits; no joint swelling or tenderness to palpation  NEUROLOGY: non-focal; no gross sensory deficits   PSYCH: A+O to person, place, and time; affect appropriate  SKIN: No rashes; warm     LABS:                        12.2   6.35  )-----------( 340      ( 2022 04:58 )             37.5     11-18    137  |  98  |  14  ----------------------------<  98  3.4<L>   |  24  |  1.05    Ca    9.7      2022 04:58  Phos  4.3     11-18  Mg     1.90     18      PT/INR - ( 2022 10:25 )   PT: 14.7 sec;   INR: 1.26 ratio         PTT - ( 2022 10:25 )  PTT:30.1 sec      Urinalysis Basic - ( 2022 06:43 )    Color: Light Yellow / Appearance: Clear / S.013 / pH: x  Gluc: x / Ketone: Negative  / Bili: Negative / Urobili: <2 mg/dL   Blood: x / Protein: 30 mg/dL / Nitrite: Negative   Leuk Esterase: Small / RBC: 54 /HPF / WBC 6 /HPF   Sq Epi: x / Non Sq Epi: 3 /HPF / Bacteria: Negative        Culture - Fungal, Other (collected 2022 11:40)  Source: .Other LEFT NEPHROSTOMY TUBE  Preliminary Report (2022 07:19):    Testing in progress    Culture - Acid Fast - Urine (collected 2022 11:40)  Source: .Urine    Culture - Fungal, Other (collected 2022 11:40)  Source: .Other RT NEPHROSTOMY TUBE  Preliminary Report (2022 07:19):    Testing in progress    Culture - Acid Fast - Urine (collected 15 Nov 2022 12:35)  Source: .Urine Urine        RADIOLOGY & ADDITIONAL TESTS:  Results Reviewed:   Imaging Personally Reviewed:  Electrocardiogram Personally Reviewed:    COORDINATION OF CARE:  Care Discussed with Consultants/Other Providers [Y/N]:  Prior or Outpatient Records Reviewed [Y/N]:

## 2022-11-18 NOTE — PROGRESS NOTE ADULT - ASSESSMENT
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     - s/p bl NT placement by IR   - Patient can follow up outpatient for further management of NT.  - No further inpatient urologic intervention     Urology to sign off. Please reconsult as needed     [facility]  [address] [place] [id number] [phone]      Maggi Alves   Available on Teams

## 2022-11-18 NOTE — PROGRESS NOTE ADULT - ASSESSMENT
57y Male with gross hematuria, b/l hydro and KATY s/p bilateral percutaneous nephrostomy tube placement on 11/17 in Interventional Radiology.     Plan:  - Continue drainage, monitor output  - Flush drain 5cc NS qd  - Routine 3 month exchange. Outpatient IR office [phone]    [phone]

## 2022-11-18 NOTE — DIETITIAN INITIAL EVALUATION ADULT - OTHER INFO
Per chart, pt is 57 year old male PMH HIV on HAART, rectal/prostate cancer s/p radiation with perforation of rectum/anus s/p abdominoperitoneal resection with end colostomy, recently placed drain for thigh collection presenting with hematuria found to have bilateral moderate hydronephrosis s/p NT placement with IR (11/17). COVID exposure (11/14).     Pt confirms NKFA, denies difficulties chewing/swallowing. Pt reports generally good appetite/PO intake PTA, denies issues with access to food PTA. Colostomy in place. Pt denies history of diabetes, previous HbA1c (8/18) 5.3%.     Pt reports good PO intake since admission, requests regular diet. Labs notable for low K+, repleted.

## 2022-11-18 NOTE — PROGRESS NOTE ADULT - ASSESSMENT
57M with hx of HIV on HAART, rectal/prostate cancer s/p radiation with perforation of recum/anus s/p abdominoperitoneal resection with end colostomy, and recently placed drain for thigh collection p/w hematuria found to have bilateral moderate hydronephrosis s/p NT placement.

## 2022-11-18 NOTE — DIETITIAN INITIAL EVALUATION ADULT - NSICDXPASTMEDICALHX_GEN_ALL_CORE_FT
PAST MEDICAL HISTORY:  Anal cancer     Anxiety     Depressive disorder     Diverticulosis     HIV infection     HLD (hyperlipidemia)     HTN (hypertension)     Lung cancer     Prostate cancer

## 2022-11-18 NOTE — PROGRESS NOTE ADULT - SUBJECTIVE AND OBJECTIVE BOX
Interval events:   s/p bl NT placement by IR           OBJECTIVE:  Vital Signs Last 24 Hrs  T(C): 36.9 (17 Nov 2022 21:37), Max: 36.9 (17 Nov 2022 12:20)  T(F): 98.5 (17 Nov 2022 21:37), Max: 98.5 (17 Nov 2022 21:37)  HR: 105 (17 Nov 2022 21:37) (94 - 112)  BP: 139/100 (17 Nov 2022 21:37) (133/99 - 139/100)  BP(mean): --  RR: 18 (17 Nov 2022 21:37) (16 - 18)  SpO2: 98% (17 Nov 2022 21:37) (97% - 100%)    Parameters below as of 17 Nov 2022 21:37  Patient On (Oxygen Delivery Method): room air          LABS:                        13.6   8.21  )-----------( 357      ( 17 Nov 2022 18:35 )             41.5       11-17    136  |  101  |  15  ----------------------------<  98  3.7   |  24  |  1.42<H>    Ca    9.5      17 Nov 2022 06:43  Phos  4.1     11-17  Mg     2.10     11-17

## 2022-11-18 NOTE — DISCHARGE NOTE PROVIDER - NSDCCPCAREPLAN_GEN_ALL_CORE_FT
PRINCIPAL DISCHARGE DIAGNOSIS  Diagnosis: Hydronephrosis  Assessment and Plan of Treatment: You were found to have hydronephrosis in both kidneys which is the back up of fluid into the kidneys. This elevated your kidney function (creatinine). Your kidney function is now improving. You had nephrostomy tubes placed to help clear out the urine from your body. These nephrostomy tubes are currently draining a CLEAR RED fluid. It is normal to see blood in the urine upto 72 Hours following the procedure. You have to keep the nephrostomy tubes clean and flush them with 5-10 mL of normal saline daily. You should follow up with UROLOGY within one week of discharge. A visiting nurse will come to the home to help you care for your nephrostomy tubes.      SECONDARY DISCHARGE DIAGNOSES  Diagnosis: Acute UTI  Assessment and Plan of Treatment:     Diagnosis: Rectal cancer  Assessment and Plan of Treatment: You had a drain placed for a thigh collection of fluid. Your ami showed no output and interventional radiology removed the drain on 11/17/22. Please follow up with your colorectal surgeon when discharged.        PRINCIPAL DISCHARGE DIAGNOSIS  Diagnosis: Hydronephrosis  Assessment and Plan of Treatment: You were found to have hydronephrosis in both kidneys which is the back up of fluid into the kidneys. This elevated your kidney function (creatinine). Your kidney function is now improving. You had nephrostomy tubes placed to help clear out the urine from your body. These nephrostomy tubes are currently draining a CLEAR RED fluid. It is normal to see blood in the urine upto 72 Hours following the procedure. You have to keep the nephrostomy tubes clean and flush them with 5-10 mL of normal saline daily. You should follow up with UROLOGY within one week of discharge. A visiting nurse will come to the home to help you care for your nephrostomy tubes. Your CT scan showed a 1-2 mm stone in your right kidney/ ureter. Stay well hydrated.      SECONDARY DISCHARGE DIAGNOSES  Diagnosis: Acute UTI  Assessment and Plan of Treatment: You were treated for a urine infection with antibiotics and have improved. No further antibiotics are required at this time. Report any pain or burning with urination to your physician.    Diagnosis: Rectal cancer  Assessment and Plan of Treatment: You had a drain placed for a thigh collection of fluid. Your ami showed no output and interventional radiology removed the drain on 11/17/22. Please follow up with your colorectal surgeon when discharged.        PRINCIPAL DISCHARGE DIAGNOSIS  Diagnosis: Hydronephrosis  Assessment and Plan of Treatment: You were found to have hydronephrosis in both kidneys which is the back up of fluid into the kidneys. This elevated your kidney function (creatinine). Your kidney function is now improving. You had nephrostomy tubes placed to help clear out the urine from your body. These nephrostomy tubes are currently draining a CLEAR RED fluid. It is normal to see blood in the urine upto 72 Hours following the procedure. You have to keep the nephrostomy tubes clean and flush them with 5-10 mL of normal saline daily. You should follow up with UROLOGY within one week of discharge,a s well as the interventional radiology clinic for tube exchanges every 3 months. A visiting nurse will come to the home to help you care for your nephrostomy tubes. Your CT scan showed a 1-2 mm stone in your right kidney/ ureter. Continue taking Fomax, and stay well hydrated.      SECONDARY DISCHARGE DIAGNOSES  Diagnosis: Acute UTI  Assessment and Plan of Treatment: You were treated for a urine infection with antibiotics and have improved. No further antibiotics are required at this time. Report any pain or burning with urination to your physician.    Diagnosis: HIV infection  Assessment and Plan of Treatment: Please follow up with Dr. Gordon as regularly scheduled    Diagnosis: Rectal cancer  Assessment and Plan of Treatment: You had a drain placed for a thigh collection of fluid. Your ami showed no output and interventional radiology removed the drain on 11/17/22. Please follow up with your colorectal surgeon when discharged.        PRINCIPAL DISCHARGE DIAGNOSIS  Diagnosis: Hydronephrosis  Assessment and Plan of Treatment: You were found to have hydronephrosis in both kidneys which is the back up of fluid into the kidneys. This elevated your kidney function (creatinine). Your kidney function is now improving. You had nephrostomy tubes placed to help clear out the urine from your body. These nephrostomy tubes are currently draining a CLEAR RED fluid. It is normal to see blood in the urine upto 72 Hours following the procedure. You have to keep the nephrostomy tubes clean and flush them with 5-10 mL of normal saline daily. You should follow up with UROLOGY within one week of discharge,a s well as the interventional radiology clinic for tube exchanges every 3 months. A visiting nurse will come to the home to help you care for your nephrostomy tubes. Your CT scan showed a 1-2 mm stone in your right kidney/ ureter. Continue taking Fomax, and stay well hydrated.      SECONDARY DISCHARGE DIAGNOSES  Diagnosis: Acute UTI  Assessment and Plan of Treatment: You were treated for a urine infection with antibiotics and have improved. No further antibiotics are required at this time. Report any pain or burning with urination to your physician.    Diagnosis: HIV infection  Assessment and Plan of Treatment: Please follow up with Dr. Gordon as regularly scheduled    Diagnosis: Rectal cancer  Assessment and Plan of Treatment: You had a drain placed for a thigh collection of fluid. Your ami showed no output and interventional radiology removed the drain on 11/17/22. Please follow up with your colorectal surgeon when discharged.       Diagnosis: Tachycardia  Assessment and Plan of Treatment: You had elevated heart rate in the hospital, and we increased your metoprolol medication-- follow up with your primary car hitesh within 1 week of discharge to assess your medication regimen     PRINCIPAL DISCHARGE DIAGNOSIS  Diagnosis: Hydronephrosis  Assessment and Plan of Treatment: You were found to have hydronephrosis in both kidneys which is the back up of fluid into the kidneys. This elevated your kidney function (creatinine). Your kidney function is now improving. You had nephrostomy tubes placed to help clear out the urine from your body. These nephrostomy tubes are currently draining a CLEAR RED fluid. It is normal to see blood in the urine upto 72 Hours following the procedure. You have to keep the nephrostomy tubes clean and flush them with 5 mL of normal saline daily. You should follow up with UROLOGY within one week of discharge,a s well as the interventional radiology clinic for tube exchanges every 3 months. A visiting nurse will come to the home to help you care for your nephrostomy tubes. Your CT scan showed a 1-2 mm stone in your right kidney/ ureter. Continue taking Fomax, and stay well hydrated.      SECONDARY DISCHARGE DIAGNOSES  Diagnosis: Acute UTI  Assessment and Plan of Treatment: You were treated for a urine infection with antibiotics and have improved. No further antibiotics are required at this time. Report any pain or burning with urination to your physician.    Diagnosis: HIV infection  Assessment and Plan of Treatment: Please follow up with Dr. Gordon as regularly scheduled    Diagnosis: Rectal cancer  Assessment and Plan of Treatment: You had a drain placed for a thigh collection of fluid. Your ami showed no output and interventional radiology removed the drain on 11/17/22. Please follow up with your colorectal surgeon when discharged.       Diagnosis: Tachycardia  Assessment and Plan of Treatment: You had elevated heart rate in the hospital, and we increased your metoprolol medication-- follow up with your primary car hitesh within 1 week of discharge to assess your medication regimen     PRINCIPAL DISCHARGE DIAGNOSIS  Diagnosis: Hydronephrosis  Assessment and Plan of Treatment: You were found to have hydronephrosis in both kidneys which is the back up of fluid into the kidneys. This elevated your kidney function (creatinine). Your kidney function is now improving. You had nephrostomy tubes placed to help clear out the urine from your body. These nephrostomy tubes are currently draining a CLEAR RED fluid. It is normal to see blood in the urine upto 72 Hours following the procedure. You have to keep the nephrostomy tubes clean and flush them with 5 mL of normal saline daily. You should follow up with UROLOGY within one week of discharge,a s well as the interventional radiology clinic for tube exchanges every 3 months. A visiting nurse will come to the home to help you care for your nephrostomy tubes. Your CT scan showed a 1-2 mm stone in your right kidney/ ureter. Continue taking Fomax, and stay well hydrated. You baby aspirin was also stopped in the hospital due to blood in your urine and after your nephrostomy tube placement-- You still have some blood in your urine, so aspirin is still held. Please follow up outpatient for when to resume this medication.      SECONDARY DISCHARGE DIAGNOSES  Diagnosis: Acute UTI  Assessment and Plan of Treatment: You were treated for a urine infection with antibiotics and have improved. No further antibiotics are required at this time. Report any pain or burning with urination to your physician.    Diagnosis: HIV infection  Assessment and Plan of Treatment: Please follow up with Dr. Gordon as regularly scheduled    Diagnosis: Rectal cancer  Assessment and Plan of Treatment: You had a drain placed for a thigh collection of fluid. Your ami showed no output and interventional radiology removed the drain on 11/17/22. Please follow up with your colorectal surgeon when discharged.       Diagnosis: Tachycardia  Assessment and Plan of Treatment: You had elevated heart rate in the hospital, and had a normal heart rhythm-- follow up with your primary care provider within 1 week of discharge to assess your medication regimen

## 2022-11-18 NOTE — PROGRESS NOTE ADULT - ASSESSMENT
57 m with HTN, HIV Biktarvy, prostate ca 2016 s/p radiation, anal cancer (dx 4/2021) s/p radiation and chemotherapy (last 8/2021) complicated by an ulcer, abscess and rectal bleeding  s/p loop colostomy 3/30/22 and then s/p APR with end colostomy creation, VRAM flap closure, 8/24 found to have a large abscess as well, abscess cx with clostridium, bacteroides   again was admitted for GIB and pelvic/groin abscess s/p drain placement and CT also showed L ischial tuberosity osteo so pt received zosyn in the hospital and completed with PO levo and flagyl to finish a 6 week course, still has the thigh IR drain  now p/w hematuria but denied fever, dysuria or flank pain  here afebrile, WBC normal and  u/a with hematuria  KATY with Cr: 1.38  abd/pelvis CT:  Status post APR with vertical rectus abdominis musculocutaneous flap reconstruction. Previous pelvic presacral collection is collapsed around the left transgluteal pigtail drain.   Bladder wall thickening, bilateral urothelial enhancement and increased perinephric inflammation, suggestive of urinary tract infection/pyelonephritis. The progressed inflammatory changes surrounding the bilateral mid ureters causing worsening moderate bilateral hydroureteronephrosis.  urine cx negative    HIV controlled on Biktarvy, last CD4: 511 and VL ND, prostate ca s/p RT, anal ca s/p chemo and RT c/b anal ulcer, abscess s/p loop colostomy 3/30/22 and then APR with end colostomy creation, VRAM flap closure, 8/24 found to have a large abscess as well, abscess cx with clostridium, bacteroides, s/p IR drain still in place but abscess resolved, also ischial osteo s/p 6 weeks of antibiotics  now with hematuria, KATY and evidence of bladder and urothelial enhancement but urine cx negative and no fever or WBC, likely non infectious and ?radiation induced    b/l hydro s/p nephrostomy 11/17 with pain at the PCN sites , repeat CT showed resolved hydro, no collection  * f/u the nephrostomy cx and AFB  * urology raised concern for TB but pt had negative quantiferon before and also now so unlikely and also afebrile, f/u the urine AFB cx ( will take 6 weeks so will be followed as outpatient)  * s/p 4 days of ceftriaxone and discontinued as the urine cx was negative  * f/u with urology    The above assessment and plan was discussed with the primary team    Denise Gordon MD  contact on teams  After 5pm and on weekends call [phone]

## 2022-11-19 LAB
A1C WITH ESTIMATED AVERAGE GLUCOSE RESULT: 5.5 % — SIGNIFICANT CHANGE UP (ref 4–5.6)
ANION GAP SERPL CALC-SCNC: 13 MMOL/L — SIGNIFICANT CHANGE UP (ref 7–14)
BUN SERPL-MCNC: 14 MG/DL — SIGNIFICANT CHANGE UP (ref 7–23)
CALCIUM SERPL-MCNC: 9.5 MG/DL — SIGNIFICANT CHANGE UP (ref 8.4–10.5)
CHLORIDE SERPL-SCNC: 102 MMOL/L — SIGNIFICANT CHANGE UP (ref 98–107)
CO2 SERPL-SCNC: 22 MMOL/L — SIGNIFICANT CHANGE UP (ref 22–31)
CREAT SERPL-MCNC: 0.94 MG/DL — SIGNIFICANT CHANGE UP (ref 0.5–1.3)
EGFR: 95 ML/MIN/1.73M2 — SIGNIFICANT CHANGE UP
ESTIMATED AVERAGE GLUCOSE: 111 — SIGNIFICANT CHANGE UP
GLUCOSE SERPL-MCNC: 98 MG/DL — SIGNIFICANT CHANGE UP (ref 70–99)
HCT VFR BLD CALC: 39.2 % — SIGNIFICANT CHANGE UP (ref 39–50)
HGB BLD-MCNC: 12.7 G/DL — LOW (ref 13–17)
MAGNESIUM SERPL-MCNC: 1.9 MG/DL — SIGNIFICANT CHANGE UP (ref 1.6–2.6)
MCHC RBC-ENTMCNC: 27.7 PG — SIGNIFICANT CHANGE UP (ref 27–34)
MCHC RBC-ENTMCNC: 32.4 GM/DL — SIGNIFICANT CHANGE UP (ref 32–36)
MCV RBC AUTO: 85.4 FL — SIGNIFICANT CHANGE UP (ref 80–100)
NRBC # BLD: 0 /100 WBCS — SIGNIFICANT CHANGE UP (ref 0–0)
NRBC # FLD: 0 K/UL — SIGNIFICANT CHANGE UP (ref 0–0)
PHOSPHATE SERPL-MCNC: 3.9 MG/DL — SIGNIFICANT CHANGE UP (ref 2.5–4.5)
PLATELET # BLD AUTO: 337 K/UL — SIGNIFICANT CHANGE UP (ref 150–400)
POTASSIUM SERPL-MCNC: 4.4 MMOL/L — SIGNIFICANT CHANGE UP (ref 3.5–5.3)
POTASSIUM SERPL-SCNC: 4.4 MMOL/L — SIGNIFICANT CHANGE UP (ref 3.5–5.3)
RBC # BLD: 4.59 M/UL — SIGNIFICANT CHANGE UP (ref 4.2–5.8)
RBC # FLD: 14.6 % — HIGH (ref 10.3–14.5)
SODIUM SERPL-SCNC: 137 MMOL/L — SIGNIFICANT CHANGE UP (ref 135–145)
WBC # BLD: 5.49 K/UL — SIGNIFICANT CHANGE UP (ref 3.8–10.5)
WBC # FLD AUTO: 5.49 K/UL — SIGNIFICANT CHANGE UP (ref 3.8–10.5)

## 2022-11-19 PROCEDURE — 99233 SBSQ HOSP IP/OBS HIGH 50: CPT

## 2022-11-19 RX ORDER — METOPROLOL TARTRATE 50 MG
75 TABLET ORAL DAILY
Refills: 0 | Status: DISCONTINUED | OUTPATIENT
Start: 2022-11-20 | End: 2022-11-21

## 2022-11-19 RX ORDER — METOPROLOL TARTRATE 50 MG
25 TABLET ORAL ONCE
Refills: 0 | Status: COMPLETED | OUTPATIENT
Start: 2022-11-19 | End: 2022-11-19

## 2022-11-19 RX ORDER — TAMSULOSIN HYDROCHLORIDE 0.4 MG/1
0.4 CAPSULE ORAL AT BEDTIME
Refills: 0 | Status: DISCONTINUED | OUTPATIENT
Start: 2022-11-19 | End: 2022-11-21

## 2022-11-19 RX ADMIN — TAMSULOSIN HYDROCHLORIDE 0.4 MILLIGRAM(S): 0.4 CAPSULE ORAL at 21:51

## 2022-11-19 RX ADMIN — OXYCODONE HYDROCHLORIDE 5 MILLIGRAM(S): 5 TABLET ORAL at 13:56

## 2022-11-19 RX ADMIN — BICTEGRAVIR SODIUM, EMTRICITABINE, AND TENOFOVIR ALAFENAMIDE FUMARATE 1 TABLET(S): 30; 120; 15 TABLET ORAL at 11:35

## 2022-11-19 RX ADMIN — ATORVASTATIN CALCIUM 10 MILLIGRAM(S): 80 TABLET, FILM COATED ORAL at 21:51

## 2022-11-19 RX ADMIN — AMLODIPINE BESYLATE 10 MILLIGRAM(S): 2.5 TABLET ORAL at 05:20

## 2022-11-19 RX ADMIN — OLANZAPINE 10 MILLIGRAM(S): 15 TABLET, FILM COATED ORAL at 21:51

## 2022-11-19 RX ADMIN — ZOLPIDEM TARTRATE 5 MILLIGRAM(S): 10 TABLET ORAL at 21:51

## 2022-11-19 RX ADMIN — OXYCODONE HYDROCHLORIDE 5 MILLIGRAM(S): 5 TABLET ORAL at 12:56

## 2022-11-19 RX ADMIN — SENNA PLUS 2 TABLET(S): 8.6 TABLET ORAL at 21:51

## 2022-11-19 RX ADMIN — Medication 25 MILLIGRAM(S): at 12:33

## 2022-11-19 RX ADMIN — Medication 50 MILLIGRAM(S): at 05:20

## 2022-11-19 NOTE — PROGRESS NOTE ADULT - SUBJECTIVE AND OBJECTIVE BOX
Mickie Cooper MD  [facility] Division of Hospital Medicine  Pager [phone] (M-F 8AM-5PM)  Other Times: [phone]    Patient is a 57y old  Male who presents with a chief complaint of Hematuria (18 Nov 2022 15:42)    SUBJECTIVE / OVERNIGHT EVENTS: left flank pain improved     MEDICATIONS  (STANDING):  amLODIPine   Tablet 10 milliGRAM(s) Oral daily  atorvastatin 10 milliGRAM(s) Oral at bedtime  bictegravir 50 mG/emtricitabine 200 mG/tenofovir alafenamide 25 mG (BIKTARVY) 1 Tablet(s) Oral daily  OLANZapine 10 milliGRAM(s) Oral at bedtime  senna 2 Tablet(s) Oral at bedtime  tamsulosin 0.4 milliGRAM(s) Oral at bedtime    MEDICATIONS  (PRN):  acetaminophen     Tablet .. 650 milliGRAM(s) Oral every 6 hours PRN Temp greater or equal to 38C (100.4F), Mild Pain (1 - 3)  ALPRAZolam 1 milliGRAM(s) Oral three times a day PRN anxiety  oxyCODONE    IR 5 milliGRAM(s) Oral every 6 hours PRN Moderate to Severe Pain  zolpidem 5 milliGRAM(s) Oral at bedtime PRN Insomnia  zolpidem 5 milliGRAM(s) Oral at bedtime PRN Insomnia      PHYSICAL EXAM:  Vital Signs Last 24 Hrs  T(C): 36.9 (19 Nov 2022 12:20), Max: 37.5 (18 Nov 2022 17:00)  T(F): 98.4 (19 Nov 2022 12:20), Max: 99.5 (18 Nov 2022 17:00)  HR: 114 (19 Nov 2022 12:20) (100 - 135)  BP: 132/97 (19 Nov 2022 12:20) (124/102 - 137/89)  RR: 18 (19 Nov 2022 12:20) (17 - 18)  SpO2: 100% (19 Nov 2022 12:20) (98% - 100%)    Parameters below as of 19 Nov 2022 12:20  Patient On (Oxygen Delivery Method): room air        CONSTITUTIONAL: NAD, well-developed, well-groomed  RESPIRATORY: Normal respiratory effort; lungs are clear to auscultation bilaterally  CARDIOVASCULAR: Regular rate and rhythm, normal S1 and S2, no murmur/rub/gallop; No lower extremity edema  GASTROINTESTINAL: Nontender to palpation, normoactive bowel sounds, no rebound/guarding; No hepatosplenomegaly  MUSCULOSKELETAL:  no clubbing or cyanosis of digits; no joint swelling or tenderness to palpation  NEUROLOGY: non-focal; no gross sensory deficits   PSYCH: A+O to person, place, and time; affect appropriate  SKIN: No rashes; warm     LABS:                        12.7   5.49  )-----------( 337      ( 19 Nov 2022 05:21 )             39.2     11-19    137  |  102  |  14  ----------------------------<  98  4.4   |  22  |  0.94    Ca    9.5      19 Nov 2022 05:21  Phos  3.9     11-19  Mg     1.90     11-19                Culture - Fungal, Other (collected 17 Nov 2022 11:40)  Source: .Other LEFT NEPHROSTOMY TUBE  Preliminary Report (18 Nov 2022 07:19):    Testing in progress    Culture - Acid Fast - Urine (collected 17 Nov 2022 11:40)  Source: .Urine    Culture - Urine (collected 17 Nov 2022 11:40)  Source: .Urine LEFT NEPHROSTOMY TUBE  Final Report (18 Nov 2022 17:48):    No growth    Culture - Fungal, Other (collected 17 Nov 2022 11:40)  Source: .Other RT NEPHROSTOMY TUBE  Preliminary Report (18 Nov 2022 07:19):    Testing in progress    Culture - Urine (collected 17 Nov 2022 11:40)  Source: .Urine RT NEPHROSTOMY TUBE  Final Report (18 Nov 2022 17:48):    No growth        RADIOLOGY & ADDITIONAL TESTS:  Results Reviewed:   Imaging Personally Reviewed:  Electrocardiogram Personally Reviewed:    COORDINATION OF CARE:  Care Discussed with Consultants/Other Providers [Y/N]:  Prior or Outpatient Records Reviewed [Y/N]:

## 2022-11-19 NOTE — PROVIDER CONTACT NOTE (OTHER) - ACTION/TREATMENT ORDERED:
JOSE Wade aware. Will continue to monitor.
PA notified, pending further recommendation
JOSE Wade aware. Will continue to monitor.
PA made aware, pending further recommendations
IR to follow up in AM
JOSE Moya aware. Will continue to monitor.
PA made aware. Awaiting for new orders. Report given to day nurse
provider made aware. reasses BP and administer labetalol. all safety maintained
As per PA, continue to monitor at this time
JOSE Wade aware. Will continue to monitor.
JOSE Wade aware. Will continue to monitor.
shelly Valadez [phone] was notified, recommendation is to repeat the blood pressure manually in an hour to the nurse on 4N, [place].
provider made aware. reasses BP in 30 minutes. all safety maintained

## 2022-11-19 NOTE — PROVIDER CONTACT NOTE (OTHER) - ASSESSMENT
patient complains of left flank pain
Patient hypertensive, /108. Patient denies any distress or discomfort.
bp 125/102. pt a&ox4. Vitals per flowsheet. Patient denies any distress or discomfort.
/100. Patient not on tele, is medicine patient. Denies pain or distress.
Patient diastolic BP high. /106. Patient denies any distress or discomfort. as per patient this is his baseline
bp 156/108. pt a&ox4. Vitals per flowsheet. Patient denies any distress or discomfort.
, /99. Patient not on tele, is medicine patient. Denies pain or distress.
/101,  during vital signs. Pt ambulating in room at this time. Patient not on tele, is medicine patient. Denies pain or distress.
/101 HR 88 after BP meds given. Patient stated feeling well. No symptoms reported.
/97, . STAT metoprolol given as ordered. Patient not on tele, is medicine patient. Denies pain or distress.
Patient with blood tinged urine and small clots present. Patient denies any distress or discomfort. VS stable
Patient's blood pressure is 147/104, patient is asymptomatic with no reports of headache or dizziness.
 during vital signs. Patient not on tele, is medicine patient. Denies pain or distress.

## 2022-11-19 NOTE — PROVIDER CONTACT NOTE (OTHER) - RECOMMENDATIONS
JOSE Moya aware. Will continue to monitor.
shelly Valadez [phone] was notified, recommendation is to repeat the blood pressure manually in an hour to the nurse on 4N, [place].
JOSE Wade aware. Will continue to monitor.
JOSE Wade aware. Will continue to monitor.
PA made aware, pending further recommendations
notify provider
JOSE Wade aware. Will continue to monitor.
As per PA, continue to monitor at this time
JOSE Wade aware. Will continue to monitor.
PA made aware. Awaiting for new orders. Report given to day nurse
PA notified, pending further recommendation
notify provider

## 2022-11-19 NOTE — PROVIDER CONTACT NOTE (OTHER) - BACKGROUND
Patient came for UTI, hx of HTN, HIV
Patient is AO x4, with history of rectal/prostate cancer, HIV, and colostomy.
Patient admitted for UTI. PMH anal cancer, prostate cancer, HTN, HIV infection.
Patient admitted for UTI. PMH anal cancer, prostate cancer, HTN, HIV infection.
Patient came for UTI, hx of HTN, HIV
Patient came for UTI, hx of HTN, HIV
Patient admitted for UTI. PMH anal cancer, prostate cancer, HTN, HIV infection.
Patient came for UTI, hx of HTN, HIV
Patient admitted for UTI. PMH anal cancer, prostate cancer, HTN, HIV infection.
Patient came for UTI, hx of HTN, HIV
Patient came for UTI, hx of HTN

## 2022-11-19 NOTE — PROVIDER CONTACT NOTE (OTHER) - DATE AND TIME:
12-Nov-2022 07:39
19-Nov-2022 08:45
19-Nov-2022 11:30
12-Nov-2022 12:17
18-Nov-2022 11:40
14-Nov-2022 06:19
12-Nov-2022 05:50
12-Nov-2022 18:05
18-Nov-2022 06:15
13-Nov-2022 21:40
19-Nov-2022 12:30
12-Nov-2022 12:10
19-Nov-2022 17:06

## 2022-11-19 NOTE — PROVIDER CONTACT NOTE (OTHER) - REASON
/100
bp 125/102
, /99
/97, 
Patient with blood tinged urine
/101 HR 88 after BP meds given
Patient hypertensive
bp 156/108
 during vital signs
/101,  during vital signs
Patient diastolic BP high
patient's blood pressure is 147/104

## 2022-11-19 NOTE — PROGRESS NOTE ADULT - PROBLEM SELECTOR PLAN 1
Imaging with bilateral hydronephrosis  - presented with hematuria now resolved ?radiation cystitis  - was initially on abx, but culture negative, per ID can dc abx  - appreciate uro/IR recs - s/p bilateral NT placement with IR 11/17  - flank pain post IR procedure now resolved, CTAP 11/18 - NT in place, no perinephric fluid collection, resolution of bilateral hydronephrosis, mild right hydroureter with 1 to 2 mm stone at the UVJ (d/w urology will pass, no intervention needed)   - monitor creatinine, I/Os Imaging with bilateral hydronephrosis  - presented with hematuria now resolved ?radiation cystitis  - was initially on abx, but culture negative, per ID can dc abx  - appreciate uro/IR recs - s/p bilateral NT placement with IR 11/17  - flank pain post IR procedure now resolved, CTAP 11/18 - NT in place, no perinephric fluid collection, resolution of bilateral hydronephrosis, mild right hydroureter with 1 to 2 mm stone at the UVJ (d/w urology will pass, no intervention needed, trial flomax)  - monitor creatinine, I/Os

## 2022-11-19 NOTE — PROVIDER CONTACT NOTE (OTHER) - SITUATION
patient bp 156/108
 during vital signs
patient's blood pressure is 147/104
/101 HR 88 after BP meds given
/97, 
Patient with blood tinged urine
patient bp 125/102 1 hour after morning BP medications
/101,  during vital signs
Patient diastolic BP high
patient noted with bright red blood in urine for the right nephrostomy tube and pink tinged urine for the left nephrostomy tube
/100
, /99
Patient hypertensive

## 2022-11-20 DIAGNOSIS — R00.0 TACHYCARDIA, UNSPECIFIED: ICD-10-CM

## 2022-11-20 LAB
ANION GAP SERPL CALC-SCNC: 10 MMOL/L — SIGNIFICANT CHANGE UP (ref 7–14)
BUN SERPL-MCNC: 17 MG/DL — SIGNIFICANT CHANGE UP (ref 7–23)
CALCIUM SERPL-MCNC: 9.3 MG/DL — SIGNIFICANT CHANGE UP (ref 8.4–10.5)
CHLORIDE SERPL-SCNC: 104 MMOL/L — SIGNIFICANT CHANGE UP (ref 98–107)
CO2 SERPL-SCNC: 24 MMOL/L — SIGNIFICANT CHANGE UP (ref 22–31)
CREAT SERPL-MCNC: 0.93 MG/DL — SIGNIFICANT CHANGE UP (ref 0.5–1.3)
EGFR: 96 ML/MIN/1.73M2 — SIGNIFICANT CHANGE UP
GLUCOSE SERPL-MCNC: 116 MG/DL — HIGH (ref 70–99)
HCT VFR BLD CALC: 38.2 % — LOW (ref 39–50)
HGB BLD-MCNC: 12.5 G/DL — LOW (ref 13–17)
MAGNESIUM SERPL-MCNC: 1.8 MG/DL — SIGNIFICANT CHANGE UP (ref 1.6–2.6)
MCHC RBC-ENTMCNC: 28 PG — SIGNIFICANT CHANGE UP (ref 27–34)
MCHC RBC-ENTMCNC: 32.7 GM/DL — SIGNIFICANT CHANGE UP (ref 32–36)
MCV RBC AUTO: 85.7 FL — SIGNIFICANT CHANGE UP (ref 80–100)
NRBC # BLD: 0 /100 WBCS — SIGNIFICANT CHANGE UP (ref 0–0)
NRBC # FLD: 0 K/UL — SIGNIFICANT CHANGE UP (ref 0–0)
PHOSPHATE SERPL-MCNC: 3.9 MG/DL — SIGNIFICANT CHANGE UP (ref 2.5–4.5)
PLATELET # BLD AUTO: 348 K/UL — SIGNIFICANT CHANGE UP (ref 150–400)
POTASSIUM SERPL-MCNC: 3.4 MMOL/L — LOW (ref 3.5–5.3)
POTASSIUM SERPL-SCNC: 3.4 MMOL/L — LOW (ref 3.5–5.3)
RBC # BLD: 4.46 M/UL — SIGNIFICANT CHANGE UP (ref 4.2–5.8)
RBC # FLD: 14.4 % — SIGNIFICANT CHANGE UP (ref 10.3–14.5)
SODIUM SERPL-SCNC: 138 MMOL/L — SIGNIFICANT CHANGE UP (ref 135–145)
TSH SERPL-MCNC: 3.94 UIU/ML — SIGNIFICANT CHANGE UP (ref 0.27–4.2)
WBC # BLD: 5.19 K/UL — SIGNIFICANT CHANGE UP (ref 3.8–10.5)
WBC # FLD AUTO: 5.19 K/UL — SIGNIFICANT CHANGE UP (ref 3.8–10.5)

## 2022-11-20 PROCEDURE — 99233 SBSQ HOSP IP/OBS HIGH 50: CPT

## 2022-11-20 PROCEDURE — 93010 ELECTROCARDIOGRAM REPORT: CPT

## 2022-11-20 RX ORDER — AMLODIPINE BESYLATE 2.5 MG/1
5 TABLET ORAL DAILY
Refills: 0 | Status: DISCONTINUED | OUTPATIENT
Start: 2022-11-21 | End: 2022-11-21

## 2022-11-20 RX ORDER — ZOLPIDEM TARTRATE 10 MG/1
5 TABLET ORAL ONCE
Refills: 0 | Status: DISCONTINUED | OUTPATIENT
Start: 2022-11-20 | End: 2022-11-20

## 2022-11-20 RX ORDER — POTASSIUM CHLORIDE 20 MEQ
40 PACKET (EA) ORAL ONCE
Refills: 0 | Status: DISCONTINUED | OUTPATIENT
Start: 2022-11-20 | End: 2022-11-20

## 2022-11-20 RX ORDER — TAMSULOSIN HYDROCHLORIDE 0.4 MG/1
1 CAPSULE ORAL
Qty: 30 | Refills: 0
Start: 2022-11-20 | End: 2022-12-19

## 2022-11-20 RX ORDER — SODIUM CHLORIDE 9 MG/ML
500 INJECTION INTRAMUSCULAR; INTRAVENOUS; SUBCUTANEOUS ONCE
Refills: 0 | Status: COMPLETED | OUTPATIENT
Start: 2022-11-20 | End: 2022-11-20

## 2022-11-20 RX ORDER — POTASSIUM CHLORIDE 20 MEQ
40 PACKET (EA) ORAL ONCE
Refills: 0 | Status: COMPLETED | OUTPATIENT
Start: 2022-11-20 | End: 2022-11-20

## 2022-11-20 RX ADMIN — BICTEGRAVIR SODIUM, EMTRICITABINE, AND TENOFOVIR ALAFENAMIDE FUMARATE 1 TABLET(S): 30; 120; 15 TABLET ORAL at 12:21

## 2022-11-20 RX ADMIN — Medication 75 MILLIGRAM(S): at 05:21

## 2022-11-20 RX ADMIN — Medication 40 MILLIEQUIVALENT(S): at 12:20

## 2022-11-20 RX ADMIN — AMLODIPINE BESYLATE 10 MILLIGRAM(S): 2.5 TABLET ORAL at 05:19

## 2022-11-20 RX ADMIN — ZOLPIDEM TARTRATE 5 MILLIGRAM(S): 10 TABLET ORAL at 21:41

## 2022-11-20 RX ADMIN — SODIUM CHLORIDE 500 MILLILITER(S): 9 INJECTION INTRAMUSCULAR; INTRAVENOUS; SUBCUTANEOUS at 13:12

## 2022-11-20 RX ADMIN — SENNA PLUS 2 TABLET(S): 8.6 TABLET ORAL at 21:42

## 2022-11-20 RX ADMIN — ATORVASTATIN CALCIUM 10 MILLIGRAM(S): 80 TABLET, FILM COATED ORAL at 21:42

## 2022-11-20 RX ADMIN — OLANZAPINE 10 MILLIGRAM(S): 15 TABLET, FILM COATED ORAL at 21:42

## 2022-11-20 RX ADMIN — TAMSULOSIN HYDROCHLORIDE 0.4 MILLIGRAM(S): 0.4 CAPSULE ORAL at 21:42

## 2022-11-20 NOTE — PROGRESS NOTE ADULT - PROBLEM SELECTOR PLAN 3
CD4 and VL from October 20 2022 were 511 and undetectable  - continue Biktarvy   - follows with Dr. Gordon patient with intermittent tachycardia 100s-110s  - remainder VSS, Hb stable post IR NT placement, CTAP post procedure stable as well  - pain controlled, check EKG, TSH  - increased home metoprolol from 50 to 75mg daily  - trend HR patient with intermittent tachycardia 100s-110s  - remainder VSS, Hb stable post IR NT placement, CTAP post procedure stable as well  - pain controlled, check EKG, TSH  - increased home metoprolol from 50 to 75mg daily  - monitor vitals, monitor on tele

## 2022-11-20 NOTE — PROGRESS NOTE ADULT - PROBLEM SELECTOR PLAN 5
- continue Xanax rectal/prostate cancer s/p radiation with perforation of recum/anus s/p abdominoperitoneal resection with end colostomy, and recently placed drain for thigh collection  - follows with colorectal surgery  - CTAP with no remaining fluid collection   - left thigh/pelvis drain with no output - reviewed by IR - removed drain 11/17

## 2022-11-20 NOTE — PROGRESS NOTE ADULT - PROBLEM SELECTOR PLAN 6
BP stable  - c/w amlodipine  - intermittently tachycardic, remainder VSS, pain controlled, increased home metoprolol to 75mg daily  - monitor vital signs - continue Xanax

## 2022-11-20 NOTE — PROGRESS NOTE ADULT - PROBLEM SELECTOR PLAN 4
rectal/prostate cancer s/p radiation with perforation of recum/anus s/p abdominoperitoneal resection with end colostomy, and recently placed drain for thigh collection  - follows with colorectal surgery  - CTAP with no remaining fluid collection   - left thigh/pelvis drain with no output - reviewed by IR - removed drain 11/17 CD4 and VL from October 20 2022 were 511 and undetectable  - continue Biktarvy   - follows with Dr. Gordon

## 2022-11-20 NOTE — PROGRESS NOTE ADULT - SUBJECTIVE AND OBJECTIVE BOX
Mickie Cooper MD  [facility] Division of Hospital Medicine  Pager [phone] (M-F 8AM-5PM)  Other Times: [id number]    Patient is a 57y old  Male who presents with a chief complaint of Hematuria (19 Nov 2022 14:00)    SUBJECTIVE / OVERNIGHT EVENTS: patient feels well, no complaints    MEDICATIONS  (STANDING):  atorvastatin 10 milliGRAM(s) Oral at bedtime  bictegravir 50 mG/emtricitabine 200 mG/tenofovir alafenamide 25 mG (BIKTARVY) 1 Tablet(s) Oral daily  metoprolol succinate ER 75 milliGRAM(s) Oral daily  OLANZapine 10 milliGRAM(s) Oral at bedtime  potassium chloride    Tablet ER 40 milliEquivalent(s) Oral once  senna 2 Tablet(s) Oral at bedtime  tamsulosin 0.4 milliGRAM(s) Oral at bedtime    MEDICATIONS  (PRN):  acetaminophen     Tablet .. 650 milliGRAM(s) Oral every 6 hours PRN Temp greater or equal to 38C (100.4F), Mild Pain (1 - 3)  oxyCODONE    IR 5 milliGRAM(s) Oral every 6 hours PRN Moderate to Severe Pain      PHYSICAL EXAM:  Vital Signs Last 24 Hrs  T(C): 37 (20 Nov 2022 09:29), Max: 37 (20 Nov 2022 05:00)  T(F): 98.6 (20 Nov 2022 09:29), Max: 98.6 (20 Nov 2022 05:00)  HR: 109 (20 Nov 2022 09:29) (100 - 135)  BP: 107/86 (20 Nov 2022 09:29) (107/86 - 140/89)  RR: 18 (20 Nov 2022 09:29) (17 - 18)  SpO2: 99% (20 Nov 2022 09:29) (97% - 100%)    Parameters below as of 20 Nov 2022 09:29  Patient On (Oxygen Delivery Method): room air    CONSTITUTIONAL: NAD, well-developed, well-groomed  RESPIRATORY: Normal respiratory effort; lungs are clear to auscultation bilaterally  CARDIOVASCULAR: Regular rate and rhythm, normal S1 and S2, no murmur/rub/gallop; No lower extremity edema  GASTROINTESTINAL: Nontender to palpation, normoactive bowel sounds, no rebound/guarding; No hepatosplenomegaly  MUSCULOSKELETAL:  no clubbing or cyanosis of digits; no joint swelling or tenderness to palpation  NEUROLOGY: non-focal; no gross sensory deficits   PSYCH: A+O to person, place, and time; affect appropriate  SKIN: No rashes; warm     LABS:                        12.5   5.19  )-----------( 348      ( 20 Nov 2022 06:25 )             38.2     11-20    138  |  104  |  17  ----------------------------<  116<H>  3.4<L>   |  24  |  0.93    Ca    9.3      20 Nov 2022 06:25  Phos  3.9     11-20  Mg     1.80     11-20                Culture - Fungal, Other (collected 17 Nov 2022 11:40)  Source: .Other LEFT NEPHROSTOMY TUBE  Preliminary Report (18 Nov 2022 07:19):    Testing in progress    Culture - Acid Fast - Urine (collected 17 Nov 2022 11:40)  Source: .Urine  Preliminary Report (19 Nov 2022 15:05):    Culture is being performed.    Culture - Urine (collected 17 Nov 2022 11:40)  Source: .Urine LEFT NEPHROSTOMY TUBE  Final Report (18 Nov 2022 17:48):    No growth    Culture - Fungal, Other (collected 17 Nov 2022 11:40)  Source: .Other RT NEPHROSTOMY TUBE  Preliminary Report (18 Nov 2022 07:19):    Testing in progress    Culture - Urine (collected 17 Nov 2022 11:40)  Source: .Urine RT NEPHROSTOMY TUBE  Final Report (18 Nov 2022 17:48):    No growth        RADIOLOGY & ADDITIONAL TESTS:  Results Reviewed:   Imaging Personally Reviewed:  Electrocardiogram Personally Reviewed:    COORDINATION OF CARE:  Care Discussed with Consultants/Other Providers [Y/N]:  Prior or Outpatient Records Reviewed [Y/N]:

## 2022-11-20 NOTE — PROGRESS NOTE ADULT - PROBLEM SELECTOR PLAN 1
Imaging with bilateral hydronephrosis  - presented with hematuria now resolved ?radiation cystitis  - was initially on abx, but culture negative, per ID can dc abx  - appreciate uro/IR recs - s/p bilateral NT placement with IR 11/17  - flank pain post IR procedure now resolved, CTAP 11/18 - NT in place, no perinephric fluid collection, resolution of bilateral hydronephrosis, mild right hydroureter with 1 to 2 mm stone at the UVJ (d/w urology will pass, no intervention needed, trial flomax)  - monitor creatinine, I/Os

## 2022-11-20 NOTE — PROGRESS NOTE ADULT - PROBLEM SELECTOR PLAN 7
DVT ppx: SCDs, pharm ppx on hold given hematuria on admission  DIET: DASH  DISPO: Home BP stable  - c/w amlodipine  - intermittently tachycardic, remainder VSS, pain controlled, increased home metoprolol to 75mg daily  - monitor vital signs

## 2022-11-21 ENCOUNTER — TRANSCRIPTION ENCOUNTER (OUTPATIENT)
Age: 57
End: 2022-11-21

## 2022-11-21 VITALS
OXYGEN SATURATION: 98 % | HEART RATE: 109 BPM | DIASTOLIC BLOOD PRESSURE: 94 MMHG | TEMPERATURE: 98 F | RESPIRATION RATE: 18 BRPM | SYSTOLIC BLOOD PRESSURE: 119 MMHG

## 2022-11-21 LAB
ANION GAP SERPL CALC-SCNC: 11 MMOL/L — SIGNIFICANT CHANGE UP (ref 7–14)
BUN SERPL-MCNC: 12 MG/DL — SIGNIFICANT CHANGE UP (ref 7–23)
CALCIUM SERPL-MCNC: 9.3 MG/DL — SIGNIFICANT CHANGE UP (ref 8.4–10.5)
CHLORIDE SERPL-SCNC: 104 MMOL/L — SIGNIFICANT CHANGE UP (ref 98–107)
CO2 SERPL-SCNC: 24 MMOL/L — SIGNIFICANT CHANGE UP (ref 22–31)
CREAT SERPL-MCNC: 0.95 MG/DL — SIGNIFICANT CHANGE UP (ref 0.5–1.3)
EGFR: 94 ML/MIN/1.73M2 — SIGNIFICANT CHANGE UP
GLUCOSE SERPL-MCNC: 106 MG/DL — HIGH (ref 70–99)
HCT VFR BLD CALC: 37.6 % — LOW (ref 39–50)
HGB BLD-MCNC: 12.2 G/DL — LOW (ref 13–17)
MAGNESIUM SERPL-MCNC: 1.8 MG/DL — SIGNIFICANT CHANGE UP (ref 1.6–2.6)
MCHC RBC-ENTMCNC: 27.9 PG — SIGNIFICANT CHANGE UP (ref 27–34)
MCHC RBC-ENTMCNC: 32.4 GM/DL — SIGNIFICANT CHANGE UP (ref 32–36)
MCV RBC AUTO: 85.8 FL — SIGNIFICANT CHANGE UP (ref 80–100)
NRBC # BLD: 0 /100 WBCS — SIGNIFICANT CHANGE UP (ref 0–0)
NRBC # FLD: 0 K/UL — SIGNIFICANT CHANGE UP (ref 0–0)
PHOSPHATE SERPL-MCNC: 4 MG/DL — SIGNIFICANT CHANGE UP (ref 2.5–4.5)
PLATELET # BLD AUTO: 356 K/UL — SIGNIFICANT CHANGE UP (ref 150–400)
POTASSIUM SERPL-MCNC: 3.9 MMOL/L — SIGNIFICANT CHANGE UP (ref 3.5–5.3)
POTASSIUM SERPL-SCNC: 3.9 MMOL/L — SIGNIFICANT CHANGE UP (ref 3.5–5.3)
RBC # BLD: 4.38 M/UL — SIGNIFICANT CHANGE UP (ref 4.2–5.8)
RBC # FLD: 14.4 % — SIGNIFICANT CHANGE UP (ref 10.3–14.5)
SODIUM SERPL-SCNC: 139 MMOL/L — SIGNIFICANT CHANGE UP (ref 135–145)
WBC # BLD: 5.17 K/UL — SIGNIFICANT CHANGE UP (ref 3.8–10.5)
WBC # FLD AUTO: 5.17 K/UL — SIGNIFICANT CHANGE UP (ref 3.8–10.5)

## 2022-11-21 PROCEDURE — 99239 HOSP IP/OBS DSCHRG MGMT >30: CPT

## 2022-11-21 RX ORDER — ASPIRIN/CALCIUM CARB/MAGNESIUM 324 MG
0 TABLET ORAL
Qty: 0 | Refills: 0 | DISCHARGE

## 2022-11-21 RX ADMIN — BICTEGRAVIR SODIUM, EMTRICITABINE, AND TENOFOVIR ALAFENAMIDE FUMARATE 1 TABLET(S): 30; 120; 15 TABLET ORAL at 12:22

## 2022-11-21 RX ADMIN — AMLODIPINE BESYLATE 5 MILLIGRAM(S): 2.5 TABLET ORAL at 05:42

## 2022-11-21 RX ADMIN — Medication 75 MILLIGRAM(S): at 05:42

## 2022-11-21 NOTE — PROGRESS NOTE ADULT - PROBLEM SELECTOR PLAN 5
rectal/prostate cancer s/p radiation with perforation of recum/anus s/p abdominoperitoneal resection with end colostomy, and recently placed drain for thigh collection  - follows with colorectal surgery  - CTAP with no remaining fluid collection   - left thigh/pelvis drain with no output - reviewed by IR - removed drain 11/17 rectal/prostate cancer s/p radiation with perforation of rectum/anus s/p abdominoperitoneal resection with end colostomy, and recently placed drain for thigh collection  - follows with colorectal surgery  - CTAP with no remaining fluid collection   - left thigh/pelvis drain with no output - reviewed by IR - removed drain 11/17

## 2022-11-21 NOTE — PROGRESS NOTE ADULT - PROVIDER SPECIALTY LIST ADULT
Hospitalist
Infectious Disease
Intervent Radiology
Urology
Hospitalist
Hospitalist
Infectious Disease
Infectious Disease
Urology
Urology
Hospitalist

## 2022-11-21 NOTE — CHART NOTE - NSCHARTNOTESELECT_GEN_ALL_CORE
Event Note
Event Note
IR note/Event Note
Interventional Radiology/Event Note
Interventional radiology/Event Note
Event Note
IR follow up note/Event Note
Interventional Radiology/Event Note
Pre-IR/Event Note
Urology/Event Note

## 2022-11-21 NOTE — PROGRESS NOTE ADULT - PROBLEM SELECTOR PLAN 2
Resolved   - US renal with bilateral moderate hydronephrosis  - s/p bilateral NT placement with IR 11/17  - monitor UOP, renally dose meds
Resolved   - US renal with bilateral moderate hydronephrosis  - s/p bilateral NT placement with IR 11/17  - monitor UOP, renally dose meds
SCr 1.38 (baseline ~0.7)  - US renal with bilateral moderate hydronephrosis  - appreciate urology recs - recommending IR NT placement (pending 11/17)  - monitor UOP, renally dose meds
SCr 1.4 (baseline ~0.7)  - US renal with bilateral moderate hydronephrosis  - appreciate urology recs - recommending IR NT placement (pending 11/17)  - monitor UOP, renally dose meds
SCr 1.38 (baseline ~0.7)  - US renal with bilateral moderate hydronephrosis  - f/u urology recommendations ?may need IR NT placement   - monitor UOP, renally dose meds
- trend creatinine  - IV hydration
SCr 1.3 (baseline ~0.7)  - US renal with bilateral moderate hydronephrosis  - Urology following - will follow up given US renal findings  - monitor UOP, renally dose meds
Resolved   - US renal with bilateral moderate hydronephrosis  - s/p bilateral NT placement with IR 11/17  - monitor UOP, renally dose meds
Resolved   - US renal with bilateral moderate hydronephrosis  - s/p bilateral NT placement with IR 11/17  - monitor UOP, renally dose meds

## 2022-11-21 NOTE — PROGRESS NOTE ADULT - REASON FOR ADMISSION
Hematuria

## 2022-11-21 NOTE — PROGRESS NOTE ADULT - PROBLEM SELECTOR PROBLEM 2
KATY (acute kidney injury)

## 2022-11-21 NOTE — PROGRESS NOTE ADULT - PROBLEM SELECTOR PLAN 3
patient with intermittent tachycardia 100s-110s  - remainder VSS, Hb stable post IR NT placement, CTAP post procedure stable as well  - pain controlled, check EKG, TSH  - increased home metoprolol from 50 to 75mg daily  - monitor vitals, monitor on tele patient with intermittent tachycardia 100s-110s  - remainder VSS, Hb stable post IR NT placement, CTAP post procedure stable as well  - pain controlled.  - increased home metoprolol from 50 to 75mg daily but didn't affect HR. So with decreased back down to home dose.  -Likely physiologic tachycardia from illness.

## 2022-11-21 NOTE — PROGRESS NOTE ADULT - PROBLEM SELECTOR PLAN 7
BP stable  - c/w amlodipine  - intermittently tachycardic, remainder VSS, pain controlled, increased home metoprolol to 75mg daily  - monitor vital signs BP stable  - c/w amlodipine  - c/w home metoprolol dose  - monitor vital signs

## 2022-11-21 NOTE — PROGRESS NOTE ADULT - PROBLEM SELECTOR PROBLEM 1
Acute pyelonephritis
Acute pyelonephritis
Radiation cystitis
Acute pyelonephritis
Hydronephrosis
Acute pyelonephritis
Acute pyelonephritis
Hydronephrosis

## 2022-11-21 NOTE — DISCHARGE NOTE NURSING/CASE MANAGEMENT/SOCIAL WORK - PATIENT PORTAL LINK FT
You can access the FollowMyHealth Patient Portal offered by [facility] by registering at the following website: [email or web address]. By joining [facility]’s FollowMyHealth portal, you will also be able to view your health information using other applications (apps) compatible with our system.

## 2022-11-21 NOTE — DISCHARGE NOTE NURSING/CASE MANAGEMENT/SOCIAL WORK - NSDCPEFALRISK_GEN_ALL_CORE
For information on Fall & Injury Prevention, visit: [email or web address] OR  [email or web address] OR  https://www.cdc.gov/steadi/patient.html

## 2022-11-21 NOTE — DISCHARGE NOTE NURSING/CASE MANAGEMENT/SOCIAL WORK - NSDCFUADDAPPT_GEN_ALL_CORE_FT
Follow up at the [facility] for Urology- when you call, say you were discharged from [facility] with nephrostomy tubes and need to be seen within a week.   [address] [place] [address]   [phone]    Follow up with the interventional radiologist office for Routine 3 month exchange of nephrostomy tubes. Outpatient IR office # [phone]    Follow up with Dr. Gordon as regularly scheduled

## 2022-11-21 NOTE — PROGRESS NOTE ADULT - PROBLEM SELECTOR PLAN 8
DVT ppx: SCDs, pharm ppx on hold given hematuria on admission  DIET: DASH  DISPO: Home DVT ppx: SCDs, pharm ppx on hold given hematuria on admission  DIET: DASH  DISPO: Home today.

## 2022-11-21 NOTE — PROGRESS NOTE ADULT - SUBJECTIVE AND OBJECTIVE BOX
Patient is a 57y old  Male who presents with a chief complaint of Hematuria (20 Nov 2022 09:37)      SUBJECTIVE / OVERNIGHT EVENTS:  Patient has no new complaints. He says his HR is usually in the 110s. Denies cp, SOB, abdominal pain, N/V/D     MEDICATIONS  (STANDING):  amLODIPine   Tablet 5 milliGRAM(s) Oral daily  atorvastatin 10 milliGRAM(s) Oral at bedtime  bictegravir 50 mG/emtricitabine 200 mG/tenofovir alafenamide 25 mG (BIKTARVY) 1 Tablet(s) Oral daily  metoprolol succinate ER 75 milliGRAM(s) Oral daily  OLANZapine 10 milliGRAM(s) Oral at bedtime  senna 2 Tablet(s) Oral at bedtime  tamsulosin 0.4 milliGRAM(s) Oral at bedtime    MEDICATIONS  (PRN):  acetaminophen     Tablet .. 650 milliGRAM(s) Oral every 6 hours PRN Temp greater or equal to 38C (100.4F), Mild Pain (1 - 3)  oxyCODONE    IR 5 milliGRAM(s) Oral every 6 hours PRN Moderate to Severe Pain      Vital Signs Last 24 Hrs  T(C): 36.9 (21 Nov 2022 05:40), Max: 37.2 (20 Nov 2022 16:48)  T(F): 98.4 (21 Nov 2022 05:40), Max: 98.9 (20 Nov 2022 16:48)  HR: 107 (21 Nov 2022 05:40) (103 - 116)  BP: 113/85 (21 Nov 2022 05:40) (113/85 - 123/91)  BP(mean): --  RR: 18 (21 Nov 2022 05:40) (17 - 18)  SpO2: 100% (21 Nov 2022 05:40) (99% - 100%)    Parameters below as of 21 Nov 2022 05:40  Patient On (Oxygen Delivery Method): room air      CAPILLARY BLOOD GLUCOSE        I&O's Summary    20 Nov 2022 07:01  -  21 Nov 2022 07:00  --------------------------------------------------------  IN: 450 mL / OUT: 1875 mL / NET: -1425 mL        PHYSICAL EXAM:  GENERAL: NAD, well-developed  HEAD:  Atraumatic, Normocephalic  EYES: EOMI, PERRLA, conjunctiva and sclera clear  NECK: Supple, No JVD  CHEST/LUNG: Clear to auscultation bilaterally; No wheeze  HEART: Regular rate and rhythm; No murmurs, rubs, or gallops  ABDOMEN: Soft, Nontender, Nondistended; Bowel sounds present  EXTREMITIES:  2+ Peripheral Pulses, No clubbing, cyanosis, or edema  PSYCH: AAOx3  NEUROLOGY: non-focal  SKIN: No rashes or lesions    LABS:                        12.2   5.17  )-----------( 356      ( 21 Nov 2022 06:05 )             37.6     11-21    139  |  104  |  12  ----------------------------<  106<H>  3.9   |  24  |  0.95    Ca    9.3      21 Nov 2022 06:05  Phos  4.0     11-21  Mg     1.80     11-21                RADIOLOGY & ADDITIONAL TESTS:    Imaging Personally Reviewed:    Consultant(s) Notes Reviewed:      Care Discussed with Consultants/Other Providers:   Patient is a 57y old  Male who presents with a chief complaint of Hematuria (20 Nov 2022 09:37)      SUBJECTIVE / OVERNIGHT EVENTS:  Patient has no new complaints. He says his HR is usually in the 110s. Denies cp, SOB, abdominal pain, N/V/D     MEDICATIONS  (STANDING):  amLODIPine   Tablet 5 milliGRAM(s) Oral daily  atorvastatin 10 milliGRAM(s) Oral at bedtime  bictegravir 50 mG/emtricitabine 200 mG/tenofovir alafenamide 25 mG (BIKTARVY) 1 Tablet(s) Oral daily  metoprolol succinate ER 75 milliGRAM(s) Oral daily  OLANZapine 10 milliGRAM(s) Oral at bedtime  senna 2 Tablet(s) Oral at bedtime  tamsulosin 0.4 milliGRAM(s) Oral at bedtime    MEDICATIONS  (PRN):  acetaminophen     Tablet .. 650 milliGRAM(s) Oral every 6 hours PRN Temp greater or equal to 38C (100.4F), Mild Pain (1 - 3)  oxyCODONE    IR 5 milliGRAM(s) Oral every 6 hours PRN Moderate to Severe Pain      Vital Signs Last 24 Hrs  T(C): 36.9 (21 Nov 2022 05:40), Max: 37.2 (20 Nov 2022 16:48)  T(F): 98.4 (21 Nov 2022 05:40), Max: 98.9 (20 Nov 2022 16:48)  HR: 107 (21 Nov 2022 05:40) (103 - 116)  BP: 113/85 (21 Nov 2022 05:40) (113/85 - 123/91)  BP(mean): --  RR: 18 (21 Nov 2022 05:40) (17 - 18)  SpO2: 100% (21 Nov 2022 05:40) (99% - 100%)    Parameters below as of 21 Nov 2022 05:40  Patient On (Oxygen Delivery Method): room air      CAPILLARY BLOOD GLUCOSE        I&O's Summary    20 Nov 2022 07:01  -  21 Nov 2022 07:00  --------------------------------------------------------  IN: 450 mL / OUT: 1875 mL / NET: -1425 mL        PHYSICAL EXAM:  GENERAL: NAD, well-developed  HEAD:  Atraumatic, Normocephalic  EYES: EOMI, PERRLA, conjunctiva and sclera clear  NECK: Supple, No JVD  CHEST/LUNG: Clear to auscultation bilaterally; No wheeze  HEART: Regular rate and rhythm; No murmurs, rubs, or gallops  ABDOMEN: Soft, Nontender, Nondistended; Bowel sounds present B/L posterior NT draining clear urine.   EXTREMITIES:  2+ Peripheral Pulses, No clubbing, cyanosis, or edema  PSYCH: AAOx3  NEUROLOGY: non-focal  SKIN: No rashes or lesions    LABS:                        12.2   5.17  )-----------( 356      ( 21 Nov 2022 06:05 )             37.6     11-21    139  |  104  |  12  ----------------------------<  106<H>  3.9   |  24  |  0.95    Ca    9.3      21 Nov 2022 06:05  Phos  4.0     11-21  Mg     1.80     11-21                RADIOLOGY & ADDITIONAL TESTS:    Imaging Personally Reviewed:    Consultant(s) Notes Reviewed:      Care Discussed with Consultants/Other Providers:

## 2022-11-21 NOTE — PROGRESS NOTE ADULT - ASSESSMENT
57 y.o. Male  w/ hx of HIV on HAART, rectal/prostate cancer s/p radiation with perforation of recum/anus s/p abdominoperitoneal resection with end colostomy, and recently placed drain for thigh collection p/w hematuria found to have bilateral moderate hydronephrosis s/p NT placement.  57 y.o. Male  w/ hx of HIV on HAART, rectal/prostate cancer s/p radiation with perforation of recum/anus s/p abdominoperitoneal resection with end colostomy, and recently placed drain for thigh collection p/w hematuria found to have bilateral moderate hydronephrosis s/p NT placement. D/C 40 minutes.

## 2022-11-22 ENCOUNTER — APPOINTMENT (OUTPATIENT)
Dept: COLORECTAL SURGERY | Facility: CLINIC | Age: 57
End: 2022-11-22

## 2022-11-23 ENCOUNTER — APPOINTMENT (OUTPATIENT)
Dept: INTERNAL MEDICINE | Facility: CLINIC | Age: 57
End: 2022-11-23

## 2022-11-29 ENCOUNTER — APPOINTMENT (OUTPATIENT)
Dept: UROLOGY | Facility: CLINIC | Age: 57
End: 2022-11-29

## 2022-11-29 VITALS
SYSTOLIC BLOOD PRESSURE: 131 MMHG | TEMPERATURE: 97.6 F | DIASTOLIC BLOOD PRESSURE: 91 MMHG | BODY MASS INDEX: 27.3 KG/M2 | WEIGHT: 195 LBS | HEART RATE: 99 BPM | HEIGHT: 71 IN | OXYGEN SATURATION: 100 %

## 2022-11-29 PROCEDURE — 99213 OFFICE O/P EST LOW 20 MIN: CPT

## 2022-11-29 NOTE — ASSESSMENT
[FreeTextEntry1] : 56y/o male with recent bilateral hydronephrosis and nephrostomy positioning.\par Discharge from hospital on 11/21/22\par \par Patient reports occasional hematuria in the nephrostomy bags.\par \par CT scan reviewed with the patient, revealing a resolution of bilateral hydronephrosis present on prior examination 11/11/2022. Mild right hydroureter with 1 to 2 mm stone at the UVJ.\par \par Collecting blood for creatinine check\par \par The patient is advised about the possibility of performing a bilateral ureteroscopy with laser lithotripsy, aimed to nephrostomy removal. Pros and cons are explained.\par The patient is booked for surgery.

## 2022-11-29 NOTE — REVIEW OF SYSTEMS
[Chills] : chills [Feeling Tired] : feeling tired [Recent Weight Gain (___ Lbs)] : recent [unfilled] ~Ulb weight gain [Palpitations] : palpitations [Abdominal Pain] : abdominal pain [Constipation] : constipation [Loss of interest] : loss of interest in sexual activity [No erections] : no erections [Urine Infection (bladder/kidney)] : bladder/kidney infection [Blood in urine that you can see] : blood visible in urine [Told you have blood in urine on a urine test] : told blood was present in a urine test [History of kidney stones] : history of kidney stones [Wake up at night to urinate  How many times?  ___] : wakes up to urinate [unfilled] times during the night [Slow urine stream] : slow urine stream [Bladder fullness after urinating] : bladder fullness after urinating [Difficulty Walking] : difficulty walking [Anxiety] : anxiety [Depression] : depression [Hot Flashes] : hot flashes [Feelings Of Weakness] : feelings of weakness [Negative] : Heme/Lymph [FreeTextEntry3] : Strain urge to urinate causing leak of urine\par Dribbling of urine

## 2022-11-29 NOTE — HISTORY OF PRESENT ILLNESS
[FreeTextEntry1] : 58y/o male with recent bilateral hydronephrosis and nephrostomy positioning.\par Discharge from hospital on 11/21/22

## 2022-11-29 NOTE — PHYSICAL EXAM
[General Appearance - Well Developed] : well developed [General Appearance - Well Nourished] : well nourished [Normal Appearance] : normal appearance [Well Groomed] : well groomed [General Appearance - In No Acute Distress] : no acute distress [Edema] : no peripheral edema [Respiration, Rhythm And Depth] : normal respiratory rhythm and effort [Exaggerated Use Of Accessory Muscles For Inspiration] : no accessory muscle use [Abdomen Soft] : soft [Abdomen Tenderness] : non-tender [Costovertebral Angle Tenderness] : no ~M costovertebral angle tenderness [FreeTextEntry1] : nephrostomies in place [Urethral Meatus] : meatus normal [Urinary Bladder Findings] : the bladder was normal on palpation [Scrotum] : the scrotum was normal [Testes Mass (___cm)] : there were no testicular masses [No Prostate Nodules] : no prostate nodules [Normal Station and Gait] : the gait and station were normal for the patient's age [] : no rash [No Focal Deficits] : no focal deficits [Oriented To Time, Place, And Person] : oriented to person, place, and time [Affect] : the affect was normal [Mood] : the mood was normal [Not Anxious] : not anxious [No Palpable Adenopathy] : no palpable adenopathy

## 2022-11-30 LAB
ALBUMIN SERPL ELPH-MCNC: 4.2 G/DL
ANION GAP SERPL CALC-SCNC: 12 MMOL/L
BUN SERPL-MCNC: 16 MG/DL
CALCIUM SERPL-MCNC: 9.9 MG/DL
CHLORIDE SERPL-SCNC: 103 MMOL/L
CO2 SERPL-SCNC: 26 MMOL/L
CREAT SERPL-MCNC: 0.74 MG/DL
EGFR: 106 ML/MIN/1.73M2
GLUCOSE SERPL-MCNC: 108 MG/DL
PHOSPHATE SERPL-MCNC: 4.6 MG/DL
POTASSIUM SERPL-SCNC: 4.5 MMOL/L
PSA SERPL-MCNC: <0.01 NG/ML
SODIUM SERPL-SCNC: 140 MMOL/L

## 2022-12-15 ENCOUNTER — OUTPATIENT (OUTPATIENT)
Dept: OUTPATIENT SERVICES | Facility: HOSPITAL | Age: 57
LOS: 1 days | End: 2022-12-15
Payer: MEDICAID

## 2022-12-15 ENCOUNTER — RESULT REVIEW (OUTPATIENT)
Age: 57
End: 2022-12-15

## 2022-12-15 ENCOUNTER — APPOINTMENT (OUTPATIENT)
Dept: UROLOGY | Facility: HOSPITAL | Age: 57
End: 2022-12-15

## 2022-12-15 VITALS
RESPIRATION RATE: 18 BRPM | OXYGEN SATURATION: 99 % | DIASTOLIC BLOOD PRESSURE: 99 MMHG | TEMPERATURE: 98 F | SYSTOLIC BLOOD PRESSURE: 128 MMHG | HEART RATE: 100 BPM

## 2022-12-15 DIAGNOSIS — C20 MALIGNANT NEOPLASM OF RECTUM: ICD-10-CM

## 2022-12-15 DIAGNOSIS — K62.9 DISEASE OF ANUS AND RECTUM, UNSPECIFIED: Chronic | ICD-10-CM

## 2022-12-15 PROCEDURE — 50435 EXCHANGE NEPHROSTOMY CATH: CPT | Mod: RT

## 2022-12-22 ENCOUNTER — APPOINTMENT (OUTPATIENT)
Dept: INTERNAL MEDICINE | Facility: CLINIC | Age: 57
End: 2022-12-22

## 2022-12-22 ENCOUNTER — APPOINTMENT (OUTPATIENT)
Dept: CARDIOLOGY | Facility: CLINIC | Age: 57
End: 2022-12-22

## 2022-12-22 ENCOUNTER — NON-APPOINTMENT (OUTPATIENT)
Age: 57
End: 2022-12-22

## 2022-12-22 VITALS
HEIGHT: 71 IN | HEART RATE: 105 BPM | BODY MASS INDEX: 25.9 KG/M2 | DIASTOLIC BLOOD PRESSURE: 90 MMHG | SYSTOLIC BLOOD PRESSURE: 145 MMHG | TEMPERATURE: 97.3 F | OXYGEN SATURATION: 100 % | WEIGHT: 185 LBS

## 2022-12-22 VITALS
TEMPERATURE: 97.3 F | DIASTOLIC BLOOD PRESSURE: 90 MMHG | HEIGHT: 71 IN | HEART RATE: 105 BPM | OXYGEN SATURATION: 100 % | SYSTOLIC BLOOD PRESSURE: 130 MMHG | WEIGHT: 185 LBS | BODY MASS INDEX: 25.9 KG/M2

## 2022-12-22 PROCEDURE — 99214 OFFICE O/P EST MOD 30 MIN: CPT | Mod: 25

## 2022-12-22 PROCEDURE — 93000 ELECTROCARDIOGRAM COMPLETE: CPT | Mod: 59

## 2022-12-22 PROCEDURE — 93000 ELECTROCARDIOGRAM COMPLETE: CPT

## 2022-12-22 PROCEDURE — 99215 OFFICE O/P EST HI 40 MIN: CPT | Mod: 25

## 2022-12-22 NOTE — HISTORY OF PRESENT ILLNESS
[FreeTextEntry1] : CARL ROWLEY 57 year M HIV positive, anal ca, prostate ca treated with XRT and chemo though no anthracyclines. No prior cardiac hx. No DM but prior longstanding smoker now abated. Denies exertional chest pain. Mild  dyspnea, (NYHA class 2-3) likely pulmonary origin;  Not currently symptomatic. EKG NSR 75 BPM SHYANN RVH. Wheezing has markedly diminished with tobacco cessation. RCRI score is 1-2 low-moderate periop MACE risk;   no prohibitive cardiac contraindications to contemplated renal calculus removal procedure.  Echo no RVH SHYANN. LVEF  >60%. BP borderline encourage pharma compliance.  Amlodipine, metorpolol, atorvastatin ASA. No interval complaints. Tolerating medications well. Total visit time 45min.\par

## 2022-12-23 DIAGNOSIS — N13.30 UNSPECIFIED HYDRONEPHROSIS: ICD-10-CM

## 2022-12-23 DIAGNOSIS — C20 MALIGNANT NEOPLASM OF RECTUM: ICD-10-CM

## 2022-12-23 DIAGNOSIS — T83.032A LEAKAGE OF NEPHROSTOMY CATHETER, INITIAL ENCOUNTER: ICD-10-CM

## 2022-12-28 LAB
ALBUMIN SERPL ELPH-MCNC: 4.1 G/DL
ALP BLD-CCNC: 108 U/L
ALT SERPL-CCNC: 8 U/L
ANION GAP SERPL CALC-SCNC: 10 MMOL/L
APTT BLD: 36.8 SEC
AST SERPL-CCNC: 12 U/L
BACTERIA UR CULT: ABNORMAL
BASOPHILS # BLD AUTO: 0.04 K/UL
BASOPHILS NFR BLD AUTO: 0.7 %
BILIRUB SERPL-MCNC: <0.2 MG/DL
BUN SERPL-MCNC: 13 MG/DL
CALCIUM SERPL-MCNC: 10.3 MG/DL
CHLORIDE SERPL-SCNC: 104 MMOL/L
CO2 SERPL-SCNC: 28 MMOL/L
CREAT SERPL-MCNC: 0.87 MG/DL
CULTURE RESULTS: SIGNIFICANT CHANGE UP
EGFR: 101 ML/MIN/1.73M2
EOSINOPHIL # BLD AUTO: 0.06 K/UL
EOSINOPHIL NFR BLD AUTO: 1 %
GLUCOSE SERPL-MCNC: 99 MG/DL
HCT VFR BLD CALC: 41 %
HGB BLD-MCNC: 13 G/DL
IMM GRANULOCYTES NFR BLD AUTO: 0.2 %
INR PPP: 1.07 RATIO
LYMPHOCYTES # BLD AUTO: 1.93 K/UL
LYMPHOCYTES NFR BLD AUTO: 32.5 %
MAN DIFF?: NORMAL
MCHC RBC-ENTMCNC: 28.6 PG
MCHC RBC-ENTMCNC: 31.7 GM/DL
MCV RBC AUTO: 90.1 FL
MONOCYTES # BLD AUTO: 0.79 K/UL
MONOCYTES NFR BLD AUTO: 13.3 %
NEUTROPHILS # BLD AUTO: 3.11 K/UL
NEUTROPHILS NFR BLD AUTO: 52.3 %
PLATELET # BLD AUTO: 392 K/UL
POTASSIUM SERPL-SCNC: 5.2 MMOL/L
PROT SERPL-MCNC: 7.7 G/DL
PT BLD: 12.5 SEC
RBC # BLD: 4.55 M/UL
RBC # FLD: 15.2 %
SODIUM SERPL-SCNC: 142 MMOL/L
SPECIMEN SOURCE: SIGNIFICANT CHANGE UP
WBC # FLD AUTO: 5.94 K/UL

## 2022-12-28 RX ORDER — SULFAMETHOXAZOLE AND TRIMETHOPRIM 800; 160 MG/1; MG/1
800-160 TABLET ORAL TWICE DAILY
Qty: 14 | Refills: 0 | Status: ACTIVE | COMMUNITY
Start: 2022-12-28 | End: 1900-01-01

## 2022-12-28 NOTE — ASSESSMENT
[Patient Optimized for Surgery] : Patient optimized for surgery [FreeTextEntry4] : vitals stable\par EKG-SINUS rhythm AFB rate 96 bpm\par urine culture +\par sent him antibiotics\par CARL ROWLEY is medically optimized for proposed surgical procedure and anesthesia.\par \par

## 2022-12-28 NOTE — HISTORY OF PRESENT ILLNESS
[No Pertinent Cardiac History] : no history of aortic stenosis, atrial fibrillation, coronary artery disease, recent myocardial infarction, or implantable device/pacemaker [No Pertinent Pulmonary History] : no history of asthma, COPD, sleep apnea, or smoking [No Adverse Anesthesia Reaction] : no adverse anesthesia reaction in self or family member [(Patient denies any chest pain, claudication, dyspnea on exertion, orthopnea, palpitations or syncope)] : Patient denies any chest pain, claudication, dyspnea on exertion, orthopnea, palpitations or syncope [Chronic Anticoagulation] : no chronic anticoagulation [Chronic Kidney Disease] : no chronic kidney disease [Diabetes] : no diabetes [FreeTextEntry1] : kidney stone removal and possible nephrostomy bag removal [FreeTextEntry2] : 01/25/2023 [FreeTextEntry3] :  [FreeTextEntry4] : kidney stone removal and possible nephrostomy bag removal\par \par history of HIV, prostate cancer, anal cancer and hydronephrosis \par

## 2022-12-31 LAB
CULTURE RESULTS: SIGNIFICANT CHANGE UP
SPECIMEN SOURCE: SIGNIFICANT CHANGE UP

## 2023-01-05 ENCOUNTER — APPOINTMENT (OUTPATIENT)
Dept: PLASTIC SURGERY | Facility: CLINIC | Age: 58
End: 2023-01-05
Payer: MEDICAID

## 2023-01-05 VITALS
BODY MASS INDEX: 25.9 KG/M2 | OXYGEN SATURATION: 99 % | RESPIRATION RATE: 17 BRPM | DIASTOLIC BLOOD PRESSURE: 89 MMHG | HEIGHT: 71 IN | TEMPERATURE: 97.6 F | WEIGHT: 185 LBS | SYSTOLIC BLOOD PRESSURE: 128 MMHG | HEART RATE: 89 BPM

## 2023-01-05 PROCEDURE — 99211 OFF/OP EST MAY X REQ PHY/QHP: CPT

## 2023-01-06 ENCOUNTER — NON-APPOINTMENT (OUTPATIENT)
Age: 58
End: 2023-01-06

## 2023-01-11 ENCOUNTER — OUTPATIENT (OUTPATIENT)
Dept: OUTPATIENT SERVICES | Facility: HOSPITAL | Age: 58
LOS: 1 days | Discharge: HOME | End: 2023-01-11
Payer: MEDICAID

## 2023-01-11 VITALS
SYSTOLIC BLOOD PRESSURE: 132 MMHG | DIASTOLIC BLOOD PRESSURE: 74 MMHG | HEIGHT: 71 IN | WEIGHT: 202.38 LBS | TEMPERATURE: 98 F | OXYGEN SATURATION: 99 % | RESPIRATION RATE: 16 BRPM | HEART RATE: 90 BPM

## 2023-01-11 DIAGNOSIS — C61 MALIGNANT NEOPLASM OF PROSTATE: ICD-10-CM

## 2023-01-11 DIAGNOSIS — Z93.3 COLOSTOMY STATUS: Chronic | ICD-10-CM

## 2023-01-11 DIAGNOSIS — Z01.812 ENCOUNTER FOR PREPROCEDURAL LABORATORY EXAMINATION: ICD-10-CM

## 2023-01-11 DIAGNOSIS — N20.0 CALCULUS OF KIDNEY: ICD-10-CM

## 2023-01-11 DIAGNOSIS — B20 HUMAN IMMUNODEFICIENCY VIRUS [HIV] DISEASE: ICD-10-CM

## 2023-01-11 DIAGNOSIS — Z01.818 ENCOUNTER FOR OTHER PREPROCEDURAL EXAMINATION: ICD-10-CM

## 2023-01-11 DIAGNOSIS — I10 ESSENTIAL (PRIMARY) HYPERTENSION: ICD-10-CM

## 2023-01-11 DIAGNOSIS — K62.9 DISEASE OF ANUS AND RECTUM, UNSPECIFIED: Chronic | ICD-10-CM

## 2023-01-11 DIAGNOSIS — Z85.048 PERSONAL HISTORY OF OTHER MALIGNANT NEOPLASM OF RECTUM, RECTOSIGMOID JUNCTION, AND ANUS: ICD-10-CM

## 2023-01-11 LAB
ANION GAP SERPL CALC-SCNC: 7 MMOL/L — SIGNIFICANT CHANGE UP (ref 5–17)
APPEARANCE UR: ABNORMAL
BACTERIA # UR AUTO: ABNORMAL
BILIRUB UR-MCNC: NEGATIVE — SIGNIFICANT CHANGE UP
BUN SERPL-MCNC: 13 MG/DL — SIGNIFICANT CHANGE UP (ref 7–23)
CALCIUM SERPL-MCNC: 9.8 MG/DL — SIGNIFICANT CHANGE UP (ref 8.5–10.1)
CHLORIDE SERPL-SCNC: 107 MMOL/L — SIGNIFICANT CHANGE UP (ref 96–108)
CO2 SERPL-SCNC: 26 MMOL/L — SIGNIFICANT CHANGE UP (ref 22–31)
COLOR SPEC: YELLOW — SIGNIFICANT CHANGE UP
CREAT SERPL-MCNC: 0.81 MG/DL — SIGNIFICANT CHANGE UP (ref 0.5–1.3)
DIFF PNL FLD: ABNORMAL
EGFR: 103 ML/MIN/1.73M2 — SIGNIFICANT CHANGE UP
GLUCOSE SERPL-MCNC: 103 MG/DL — HIGH (ref 70–99)
GLUCOSE UR QL: NEGATIVE MG/DL — SIGNIFICANT CHANGE UP
HCT VFR BLD CALC: 41.5 % — SIGNIFICANT CHANGE UP (ref 39–50)
HGB BLD-MCNC: 13.3 G/DL — SIGNIFICANT CHANGE UP (ref 13–17)
KETONES UR-MCNC: NEGATIVE — SIGNIFICANT CHANGE UP
LEUKOCYTE ESTERASE UR-ACNC: ABNORMAL
MCHC RBC-ENTMCNC: 27.9 PG — SIGNIFICANT CHANGE UP (ref 27–34)
MCHC RBC-ENTMCNC: 32 G/DL — SIGNIFICANT CHANGE UP (ref 32–36)
MCV RBC AUTO: 87 FL — SIGNIFICANT CHANGE UP (ref 80–100)
NITRITE UR-MCNC: POSITIVE
NRBC # BLD: 0 /100 WBCS — SIGNIFICANT CHANGE UP (ref 0–0)
PH UR: 7 — SIGNIFICANT CHANGE UP (ref 5–8)
PLATELET # BLD AUTO: 285 K/UL — SIGNIFICANT CHANGE UP (ref 150–400)
POTASSIUM SERPL-MCNC: 4.4 MMOL/L — SIGNIFICANT CHANGE UP (ref 3.5–5.3)
POTASSIUM SERPL-SCNC: 4.4 MMOL/L — SIGNIFICANT CHANGE UP (ref 3.5–5.3)
PROT UR-MCNC: 100 MG/DL
RBC # BLD: 4.77 M/UL — SIGNIFICANT CHANGE UP (ref 4.2–5.8)
RBC # FLD: 14.6 % — HIGH (ref 10.3–14.5)
RBC CASTS # UR COMP ASSIST: ABNORMAL /HPF (ref 0–4)
SODIUM SERPL-SCNC: 140 MMOL/L — SIGNIFICANT CHANGE UP (ref 135–145)
SP GR SPEC: 1.01 — SIGNIFICANT CHANGE UP (ref 1.01–1.02)
UROBILINOGEN FLD QL: NEGATIVE MG/DL — SIGNIFICANT CHANGE UP
WBC # BLD: 8.07 K/UL — SIGNIFICANT CHANGE UP (ref 3.8–10.5)
WBC # FLD AUTO: 8.07 K/UL — SIGNIFICANT CHANGE UP (ref 3.8–10.5)
WBC UR QL: ABNORMAL

## 2023-01-11 PROCEDURE — 71046 X-RAY EXAM CHEST 2 VIEWS: CPT | Mod: 26

## 2023-01-11 RX ORDER — SODIUM CHLORIDE 9 MG/ML
3 INJECTION INTRAMUSCULAR; INTRAVENOUS; SUBCUTANEOUS EVERY 8 HOURS
Refills: 0 | Status: DISCONTINUED | OUTPATIENT
Start: 2023-01-25 | End: 2023-02-08

## 2023-01-11 NOTE — H&P PST ADULT - PROBLEM SELECTOR PLAN 1
ureteroscopy and laser lithotripsy of 2 small stones on the left side, ureteroscopy on the right side with imaging  Pre-op instructions given by RN, patient verbalized understanding  Chlorhexidine wash instructions given   medical clearance  Anesthesiologist to review PST labs, EKG, required clearances and optimization for surgery.

## 2023-01-11 NOTE — H&P PST ADULT - HISTORY OF PRESENT ILLNESS
............57 yo male with anal fistula - scheduled for anal fistulotomy   denies recent travels in the past 30 days. No fever, SOB, cough, flu like symptoms or body rash- covid screen   57M pmhx HIV (on Biktarvy), htn, prostate CA (s/p RT), anal CA (s/p RT/chemo), anxiety with calculus of kidney here for PST for scheduled Ureteroscopy and laser lithotripsy   This patient denies any fever, cough, sob, flu like symptoms or travel outside of the [place] in the past 30 days

## 2023-01-11 NOTE — H&P PST ADULT - ASSESSMENT
57M pmhx HIV (on Biktarvy), htn, prostate CA (s/p RT), anal CA (s/p RT/chemo), anxiety with calculus of kidney here for PST for scheduled Ureteroscopy and laser lithotripsy   CAPRINI SCORE [CLOT]    AGE RELATED RISK FACTORS                                                       MOBILITY RELATED FACTORS  [ x] Age 41-60 years                                            (1 Point)                  [ ] Bed rest                                                        (1 Point)  [ ] Age: 61-74 years                                           (2 Points)                 [ ] Plaster cast                                                   (2 Points)  [ ] Age= 75 years                                              (3 Points)                 [ ] Bed bound for more than 72 hours                 (2 Points)    DISEASE RELATED RISK FACTORS                                               GENDER SPECIFIC FACTORS  [ ] Edema in the lower extremities                       (1 Point)                  [ ] Pregnancy                                                     (1 Point)  [ ] Varicose veins                                               (1 Point)                  [ ] Post-partum < 6 weeks                                   (1 Point)             [ x] BMI > 25 Kg/m2                                            (1 Point)                  [ ] Hormonal therapy  or oral contraception          (1 Point)                 [ ] Sepsis (in the previous month)                        (1 Point)                  [ ] History of pregnancy complications                 (1 point)  [ ] Pneumonia or serious lung disease                                               [ ] Unexplained or recurrent                     (1 Point)           (in the previous month)                               (1 Point)  [ ] Abnormal pulmonary function test                     (1 Point)                 SURGERY RELATED RISK FACTORS  [ ] Acute myocardial infarction                              (1 Point)                 [ ]  Section                                             (1 Point)  [ ] Congestive heart failure (in the previous month)  (1 Point)               [ ] Minor surgery                                                  (1 Point)   [ ] Inflammatory bowel disease                             (1 Point)                 [ ] Arthroscopic surgery                                        (2 Points)  [ ] Central venous access                                      (2 Points)                [ x] General surgery lasting more than 45 minutes   (2 Points)       [ ] Stroke (in the previous month)                          (5 Points)               [ ] Elective arthroplasty                                         (5 Points)                                                                                                                                               HEMATOLOGY RELATED FACTORS                                                 TRAUMA RELATED RISK FACTORS  [ ] Prior episodes of VTE                                     (3 Points)                [ ] Fracture of the hip, pelvis, or leg                       (5 Points)  [ ] Positive family history for VTE                         (3 Points)                 [ ] Acute spinal cord injury (in the previous month)  (5 Points)  [ ] Prothrombin 27358 A                                     (3 Points)                 [ ] Paralysis  (less than 1 month)                             (5 Points)  [ ] Factor V Leiden                                             (3 Points)                  [ ] Multiple Trauma within 1 month                        (5 Points)  [ ] Lupus anticoagulants                                     (3 Points)                                                           [ ] Anticardiolipin antibodies                               (3 Points)                                                       [ ] High homocysteine in the blood                      (3 Points)                                             [ ] Other congenital or acquired thrombophilia      (3 Points)                                                [ ] Heparin induced thrombocytopenia                  (3 Points)                                          Total Score [   4       ]    Caprini Score 0 - 2:  Low Risk, No VTE Prophylaxis required for most patients, encourage ambulation  Caprini Score 3 - 6:  At Risk, pharmacologic VTE prophylaxis is indicated for most patients (in the absence of a contraindication)  Caprini Score Greater than or = 7:  High Risk, pharmacologic VTE prophylaxis is indicated for most patients (in the absence of a contraindication)

## 2023-01-13 RX ORDER — CIPROFLOXACIN HYDROCHLORIDE 500 MG/1
500 TABLET, FILM COATED ORAL
Qty: 20 | Refills: 0 | Status: ACTIVE | COMMUNITY
Start: 2023-01-13 | End: 1900-01-01

## 2023-01-14 LAB
-  AMIKACIN: SIGNIFICANT CHANGE UP
-  AMOXICILLIN/CLAVULANIC ACID: SIGNIFICANT CHANGE UP
-  AMPICILLIN/SULBACTAM: SIGNIFICANT CHANGE UP
-  AMPICILLIN: SIGNIFICANT CHANGE UP
-  AZTREONAM: SIGNIFICANT CHANGE UP
-  CEFAZOLIN: SIGNIFICANT CHANGE UP
-  CEFEPIME: SIGNIFICANT CHANGE UP
-  CEFOXITIN: SIGNIFICANT CHANGE UP
-  CEFTRIAXONE: SIGNIFICANT CHANGE UP
-  CEFUROXIME: SIGNIFICANT CHANGE UP
-  CIPROFLOXACIN: SIGNIFICANT CHANGE UP
-  ERTAPENEM: SIGNIFICANT CHANGE UP
-  GENTAMICIN: SIGNIFICANT CHANGE UP
-  IMIPENEM: SIGNIFICANT CHANGE UP
-  LEVOFLOXACIN: SIGNIFICANT CHANGE UP
-  MEROPENEM: SIGNIFICANT CHANGE UP
-  NITROFURANTOIN: SIGNIFICANT CHANGE UP
-  PIPERACILLIN/TAZOBACTAM: SIGNIFICANT CHANGE UP
-  TOBRAMYCIN: SIGNIFICANT CHANGE UP
-  TRIMETHOPRIM/SULFAMETHOXAZOLE: SIGNIFICANT CHANGE UP
METHOD TYPE: SIGNIFICANT CHANGE UP

## 2023-01-15 LAB
-  AMPICILLIN: SIGNIFICANT CHANGE UP
-  CIPROFLOXACIN: SIGNIFICANT CHANGE UP
-  LEVOFLOXACIN: SIGNIFICANT CHANGE UP
-  NITROFURANTOIN: SIGNIFICANT CHANGE UP
-  TETRACYCLINE: SIGNIFICANT CHANGE UP
-  VANCOMYCIN: SIGNIFICANT CHANGE UP
CULTURE RESULTS: SIGNIFICANT CHANGE UP
METHOD TYPE: SIGNIFICANT CHANGE UP
ORGANISM # SPEC MICROSCOPIC CNT: SIGNIFICANT CHANGE UP
SPECIMEN SOURCE: SIGNIFICANT CHANGE UP

## 2023-01-23 ENCOUNTER — APPOINTMENT (OUTPATIENT)
Dept: INFECTIOUS DISEASE | Facility: CLINIC | Age: 58
End: 2023-01-23

## 2023-01-24 ENCOUNTER — TRANSCRIPTION ENCOUNTER (OUTPATIENT)
Age: 58
End: 2023-01-24

## 2023-01-25 ENCOUNTER — TRANSCRIPTION ENCOUNTER (OUTPATIENT)
Age: 58
End: 2023-01-25

## 2023-01-25 ENCOUNTER — OUTPATIENT (OUTPATIENT)
Dept: OUTPATIENT SERVICES | Facility: HOSPITAL | Age: 58
LOS: 1 days | Discharge: HOME | End: 2023-01-25
Payer: MEDICAID

## 2023-01-25 ENCOUNTER — APPOINTMENT (OUTPATIENT)
Dept: UROLOGY | Facility: HOSPITAL | Age: 58
End: 2023-01-25

## 2023-01-25 ENCOUNTER — RESULT REVIEW (OUTPATIENT)
Age: 58
End: 2023-01-25

## 2023-01-25 VITALS
SYSTOLIC BLOOD PRESSURE: 120 MMHG | HEIGHT: 71 IN | RESPIRATION RATE: 17 BRPM | OXYGEN SATURATION: 98 % | TEMPERATURE: 98 F | DIASTOLIC BLOOD PRESSURE: 89 MMHG | WEIGHT: 201.06 LBS | HEART RATE: 98 BPM

## 2023-01-25 VITALS
TEMPERATURE: 98 F | DIASTOLIC BLOOD PRESSURE: 105 MMHG | RESPIRATION RATE: 16 BRPM | SYSTOLIC BLOOD PRESSURE: 147 MMHG | HEART RATE: 98 BPM

## 2023-01-25 DIAGNOSIS — K62.9 DISEASE OF ANUS AND RECTUM, UNSPECIFIED: Chronic | ICD-10-CM

## 2023-01-25 DIAGNOSIS — Z93.3 COLOSTOMY STATUS: Chronic | ICD-10-CM

## 2023-01-25 PROCEDURE — 88300 SURGICAL PATH GROSS: CPT | Mod: 26

## 2023-01-25 PROCEDURE — 52356 CYSTO/URETERO W/LITHOTRIPSY: CPT | Mod: 50

## 2023-01-25 DEVICE — URETERAL STENT PERCUFLEX PLUS 7FR 26CM
Type: IMPLANTABLE DEVICE | Status: NON-FUNCTIONAL
Removed: 2023-01-25

## 2023-01-25 RX ORDER — CIPROFLOXACIN LACTATE 400MG/40ML
1 VIAL (ML) INTRAVENOUS
Qty: 6 | Refills: 0
Start: 2023-01-25 | End: 2023-01-27

## 2023-01-25 RX ORDER — HYDROMORPHONE HYDROCHLORIDE 2 MG/ML
0.5 INJECTION INTRAMUSCULAR; INTRAVENOUS; SUBCUTANEOUS
Refills: 0 | Status: DISCONTINUED | OUTPATIENT
Start: 2023-01-25 | End: 2023-01-26

## 2023-01-25 RX ORDER — SODIUM CHLORIDE 9 MG/ML
1000 INJECTION, SOLUTION INTRAVENOUS
Refills: 0 | Status: DISCONTINUED | OUTPATIENT
Start: 2023-01-25 | End: 2023-01-26

## 2023-01-25 RX ORDER — HYDRALAZINE HCL 50 MG
10 TABLET ORAL ONCE
Refills: 0 | Status: COMPLETED | OUTPATIENT
Start: 2023-01-25 | End: 2023-01-25

## 2023-01-25 RX ADMIN — HYDROMORPHONE HYDROCHLORIDE 0.5 MILLIGRAM(S): 2 INJECTION INTRAMUSCULAR; INTRAVENOUS; SUBCUTANEOUS at 17:09

## 2023-01-25 RX ADMIN — Medication 10 MILLIGRAM(S): at 17:26

## 2023-01-25 RX ADMIN — SODIUM CHLORIDE 75 MILLILITER(S): 9 INJECTION, SOLUTION INTRAVENOUS at 16:08

## 2023-01-25 RX ADMIN — HYDROMORPHONE HYDROCHLORIDE 0.5 MILLIGRAM(S): 2 INJECTION INTRAMUSCULAR; INTRAVENOUS; SUBCUTANEOUS at 17:15

## 2023-01-25 NOTE — ASU PATIENT PROFILE, ADULT - FALL HARM RISK - RISK INTERVENTIONS

## 2023-01-25 NOTE — ASU DISCHARGE PLAN (ADULT/PEDIATRIC) - NS MD DC FALL RISK RISK
For information on Fall & Injury Prevention, visit: [email or web address] OR  [email or web address] OR  https://www.cdc.gov/steadi/patient.html

## 2023-01-25 NOTE — ASU DISCHARGE PLAN (ADULT/PEDIATRIC) - ASU DC SPECIAL INSTRUCTIONSFT
Keep Nephrostomy tubes clamped, if unable to urinate or fever/chill please unclamp tubes and notify Dr. Mathew.

## 2023-01-26 ENCOUNTER — EMERGENCY (EMERGENCY)
Facility: HOSPITAL | Age: 58
LOS: 0 days | Discharge: HOME | End: 2023-01-26
Attending: STUDENT IN AN ORGANIZED HEALTH CARE EDUCATION/TRAINING PROGRAM
Payer: MEDICAID

## 2023-01-26 ENCOUNTER — NON-APPOINTMENT (OUTPATIENT)
Age: 58
End: 2023-01-26

## 2023-01-26 VITALS
DIASTOLIC BLOOD PRESSURE: 117 MMHG | TEMPERATURE: 99 F | SYSTOLIC BLOOD PRESSURE: 165 MMHG | RESPIRATION RATE: 18 BRPM | HEIGHT: 71 IN | OXYGEN SATURATION: 97 % | HEART RATE: 95 BPM | WEIGHT: 201.94 LBS

## 2023-01-26 VITALS
RESPIRATION RATE: 16 BRPM | SYSTOLIC BLOOD PRESSURE: 159 MMHG | TEMPERATURE: 99 F | HEART RATE: 84 BPM | OXYGEN SATURATION: 99 % | DIASTOLIC BLOOD PRESSURE: 99 MMHG

## 2023-01-26 DIAGNOSIS — B20 HUMAN IMMUNODEFICIENCY VIRUS [HIV] DISEASE: ICD-10-CM

## 2023-01-26 DIAGNOSIS — E78.5 HYPERLIPIDEMIA, UNSPECIFIED: ICD-10-CM

## 2023-01-26 DIAGNOSIS — Z85.048 PERSONAL HISTORY OF OTHER MALIGNANT NEOPLASM OF RECTUM, RECTOSIGMOID JUNCTION, AND ANUS: ICD-10-CM

## 2023-01-26 DIAGNOSIS — F32.9 MAJOR DEPRESSIVE DISORDER, SINGLE EPISODE, UNSPECIFIED: ICD-10-CM

## 2023-01-26 DIAGNOSIS — Z20.822 CONTACT WITH AND (SUSPECTED) EXPOSURE TO COVID-19: ICD-10-CM

## 2023-01-26 DIAGNOSIS — N39.0 URINARY TRACT INFECTION, SITE NOT SPECIFIED: ICD-10-CM

## 2023-01-26 DIAGNOSIS — Z85.118 PERSONAL HISTORY OF OTHER MALIGNANT NEOPLASM OF BRONCHUS AND LUNG: ICD-10-CM

## 2023-01-26 DIAGNOSIS — R50.9 FEVER, UNSPECIFIED: ICD-10-CM

## 2023-01-26 DIAGNOSIS — I49.3 VENTRICULAR PREMATURE DEPOLARIZATION: ICD-10-CM

## 2023-01-26 DIAGNOSIS — Z93.3 COLOSTOMY STATUS: ICD-10-CM

## 2023-01-26 DIAGNOSIS — F41.9 ANXIETY DISORDER, UNSPECIFIED: ICD-10-CM

## 2023-01-26 DIAGNOSIS — I10 ESSENTIAL (PRIMARY) HYPERTENSION: ICD-10-CM

## 2023-01-26 DIAGNOSIS — Z92.3 PERSONAL HISTORY OF IRRADIATION: ICD-10-CM

## 2023-01-26 DIAGNOSIS — Z85.46 PERSONAL HISTORY OF MALIGNANT NEOPLASM OF PROSTATE: ICD-10-CM

## 2023-01-26 DIAGNOSIS — Z93.6 OTHER ARTIFICIAL OPENINGS OF URINARY TRACT STATUS: ICD-10-CM

## 2023-01-26 DIAGNOSIS — Z93.3 COLOSTOMY STATUS: Chronic | ICD-10-CM

## 2023-01-26 DIAGNOSIS — K62.9 DISEASE OF ANUS AND RECTUM, UNSPECIFIED: Chronic | ICD-10-CM

## 2023-01-26 DIAGNOSIS — Z96.0 PRESENCE OF UROGENITAL IMPLANTS: ICD-10-CM

## 2023-01-26 LAB
ALBUMIN SERPL ELPH-MCNC: 4.1 G/DL — SIGNIFICANT CHANGE UP (ref 3.3–5)
ALP SERPL-CCNC: 98 U/L — SIGNIFICANT CHANGE UP (ref 40–120)
ALT FLD-CCNC: 17 U/L — SIGNIFICANT CHANGE UP (ref 12–78)
ANION GAP SERPL CALC-SCNC: 8 MMOL/L — SIGNIFICANT CHANGE UP (ref 5–17)
APPEARANCE UR: ABNORMAL
APTT BLD: 31.7 SEC — SIGNIFICANT CHANGE UP (ref 27.5–35.5)
AST SERPL-CCNC: 15 U/L — SIGNIFICANT CHANGE UP (ref 15–37)
BACTERIA # UR AUTO: ABNORMAL
BASOPHILS # BLD AUTO: 0.01 K/UL — SIGNIFICANT CHANGE UP (ref 0–0.2)
BASOPHILS NFR BLD AUTO: 0.1 % — SIGNIFICANT CHANGE UP (ref 0–2)
BILIRUB SERPL-MCNC: 0.5 MG/DL — SIGNIFICANT CHANGE UP (ref 0.2–1.2)
BILIRUB UR-MCNC: NEGATIVE — SIGNIFICANT CHANGE UP
BUN SERPL-MCNC: 14 MG/DL — SIGNIFICANT CHANGE UP (ref 7–23)
CALCIUM SERPL-MCNC: 9.9 MG/DL — SIGNIFICANT CHANGE UP (ref 8.5–10.1)
CHLORIDE SERPL-SCNC: 105 MMOL/L — SIGNIFICANT CHANGE UP (ref 96–108)
CO2 SERPL-SCNC: 27 MMOL/L — SIGNIFICANT CHANGE UP (ref 22–31)
COLOR SPEC: ABNORMAL
CREAT SERPL-MCNC: 1.02 MG/DL — SIGNIFICANT CHANGE UP (ref 0.5–1.3)
DIFF PNL FLD: ABNORMAL
EGFR: 86 ML/MIN/1.73M2 — SIGNIFICANT CHANGE UP
EOSINOPHIL # BLD AUTO: 0 K/UL — SIGNIFICANT CHANGE UP (ref 0–0.5)
EOSINOPHIL NFR BLD AUTO: 0 % — SIGNIFICANT CHANGE UP (ref 0–6)
EPI CELLS # UR: SIGNIFICANT CHANGE UP
FLUAV AG NPH QL: SIGNIFICANT CHANGE UP
FLUBV AG NPH QL: SIGNIFICANT CHANGE UP
GLUCOSE SERPL-MCNC: 126 MG/DL — HIGH (ref 70–99)
GLUCOSE UR QL: NEGATIVE MG/DL — SIGNIFICANT CHANGE UP
HCT VFR BLD CALC: 40.6 % — SIGNIFICANT CHANGE UP (ref 39–50)
HGB BLD-MCNC: 13.4 G/DL — SIGNIFICANT CHANGE UP (ref 13–17)
IMM GRANULOCYTES NFR BLD AUTO: 0.7 % — SIGNIFICANT CHANGE UP (ref 0–0.9)
INR BLD: 1.11 RATIO — SIGNIFICANT CHANGE UP (ref 0.88–1.16)
KETONES UR-MCNC: NEGATIVE — SIGNIFICANT CHANGE UP
LACTATE SERPL-SCNC: 2.4 MMOL/L — HIGH (ref 0.7–2)
LACTATE SERPL-SCNC: 2.5 MMOL/L — HIGH (ref 0.7–2)
LACTATE SERPL-SCNC: 2.7 MMOL/L — HIGH (ref 0.7–2)
LEUKOCYTE ESTERASE UR-ACNC: ABNORMAL
LYMPHOCYTES # BLD AUTO: 17.6 % — SIGNIFICANT CHANGE UP (ref 13–44)
LYMPHOCYTES # BLD AUTO: 2.17 K/UL — SIGNIFICANT CHANGE UP (ref 1–3.3)
MCHC RBC-ENTMCNC: 28.5 PG — SIGNIFICANT CHANGE UP (ref 27–34)
MCHC RBC-ENTMCNC: 33 G/DL — SIGNIFICANT CHANGE UP (ref 32–36)
MCV RBC AUTO: 86.4 FL — SIGNIFICANT CHANGE UP (ref 80–100)
MONOCYTES # BLD AUTO: 1.37 K/UL — HIGH (ref 0–0.9)
MONOCYTES NFR BLD AUTO: 11.1 % — SIGNIFICANT CHANGE UP (ref 2–14)
NEUTROPHILS # BLD AUTO: 8.67 K/UL — HIGH (ref 1.8–7.4)
NEUTROPHILS NFR BLD AUTO: 70.5 % — SIGNIFICANT CHANGE UP (ref 43–77)
NITRITE UR-MCNC: NEGATIVE — SIGNIFICANT CHANGE UP
NRBC # BLD: 0 /100 WBCS — SIGNIFICANT CHANGE UP (ref 0–0)
PH UR: 7 — SIGNIFICANT CHANGE UP (ref 5–8)
PLATELET # BLD AUTO: 338 K/UL — SIGNIFICANT CHANGE UP (ref 150–400)
POTASSIUM SERPL-MCNC: 3.8 MMOL/L — SIGNIFICANT CHANGE UP (ref 3.5–5.3)
POTASSIUM SERPL-SCNC: 3.8 MMOL/L — SIGNIFICANT CHANGE UP (ref 3.5–5.3)
PROT SERPL-MCNC: 9 GM/DL — HIGH (ref 6–8.3)
PROT UR-MCNC: 100 MG/DL
PROTHROM AB SERPL-ACNC: 13.2 SEC — SIGNIFICANT CHANGE UP (ref 10.5–13.4)
RBC # BLD: 4.7 M/UL — SIGNIFICANT CHANGE UP (ref 4.2–5.8)
RBC # FLD: 15.3 % — HIGH (ref 10.3–14.5)
RBC CASTS # UR COMP ASSIST: ABNORMAL /HPF (ref 0–4)
SARS-COV-2 RNA SPEC QL NAA+PROBE: SIGNIFICANT CHANGE UP
SODIUM SERPL-SCNC: 140 MMOL/L — SIGNIFICANT CHANGE UP (ref 135–145)
SP GR SPEC: 1 — LOW (ref 1.01–1.02)
UROBILINOGEN FLD QL: NEGATIVE MG/DL — SIGNIFICANT CHANGE UP
WBC # BLD: 12.3 K/UL — HIGH (ref 3.8–10.5)
WBC # FLD AUTO: 12.3 K/UL — HIGH (ref 3.8–10.5)
WBC UR QL: ABNORMAL

## 2023-01-26 PROCEDURE — 99285 EMERGENCY DEPT VISIT HI MDM: CPT

## 2023-01-26 PROCEDURE — 93010 ELECTROCARDIOGRAM REPORT: CPT

## 2023-01-26 PROCEDURE — 71045 X-RAY EXAM CHEST 1 VIEW: CPT | Mod: 26

## 2023-01-26 RX ORDER — SODIUM CHLORIDE 9 MG/ML
1000 INJECTION INTRAMUSCULAR; INTRAVENOUS; SUBCUTANEOUS ONCE
Refills: 0 | Status: COMPLETED | OUTPATIENT
Start: 2023-01-26 | End: 2023-01-26

## 2023-01-26 RX ORDER — SODIUM CHLORIDE 9 MG/ML
1000 INJECTION, SOLUTION INTRAVENOUS ONCE
Refills: 0 | Status: COMPLETED | OUTPATIENT
Start: 2023-01-26 | End: 2023-01-26

## 2023-01-26 RX ORDER — VANCOMYCIN HCL 1 G
1000 VIAL (EA) INTRAVENOUS ONCE
Refills: 0 | Status: COMPLETED | OUTPATIENT
Start: 2023-01-26 | End: 2023-01-26

## 2023-01-26 RX ORDER — CEFEPIME 1 G/1
2000 INJECTION, POWDER, FOR SOLUTION INTRAMUSCULAR; INTRAVENOUS ONCE
Refills: 0 | Status: COMPLETED | OUTPATIENT
Start: 2023-01-26 | End: 2023-01-26

## 2023-01-26 RX ADMIN — SODIUM CHLORIDE 1000 MILLILITER(S): 9 INJECTION INTRAMUSCULAR; INTRAVENOUS; SUBCUTANEOUS at 14:41

## 2023-01-26 RX ADMIN — SODIUM CHLORIDE 1000 MILLILITER(S): 9 INJECTION, SOLUTION INTRAVENOUS at 18:46

## 2023-01-26 RX ADMIN — Medication 250 MILLIGRAM(S): at 14:30

## 2023-01-26 RX ADMIN — CEFEPIME 100 MILLIGRAM(S): 1 INJECTION, POWDER, FOR SOLUTION INTRAMUSCULAR; INTRAVENOUS at 15:42

## 2023-01-26 NOTE — ED PROVIDER NOTE - CLINICAL SUMMARY MEDICAL DECISION MAKING FREE TEXT BOX
Presenting with fever in setting of recent uretal stent placements.  D/c on abx but has minimal urinary output and b/l nephrostomies again draining.  Had uti prior, reviewed sensitivities and culture and will treat as likely source.  Denies infectious symptoms otherwise.  Plan for labs, cultures, abx, d/w uro, reassess.

## 2023-01-26 NOTE — ED PROVIDER NOTE - PHYSICAL EXAMINATION
General appearance: Nontoxic appearing, conversant, afebrile    Eyes: anicteric sclerae, JULIANN, EOMI   HENT: Atraumatic; oropharynx clear, MMM and no ulcerations, no pharyngeal erythema or exudate   Neck: Trachea midline; Full range of motion, supple   Pulm: CTA bl, normal respiratory effort and no intercostal retractions, normal work of breathing   CV: RRR, No murmurs, rubs, or gallops   Abdomen: Soft, non-tender, non-distended; no guarding or rebound   Back: No midline ttp, step offs, deformities, no cvat, b/l nephrostomy tubes in place   Extremities: No peripheral edema, no gross deformities, FROM x4   Skin: Dry, normal temperature, turgor and texture; no rash   Psych: Appropriate affect, cooperative

## 2023-01-26 NOTE — ED PROVIDER NOTE - PATIENT PORTAL LINK FT
You can access the FollowMyHealth Patient Portal offered by [facility] by registering at the following website: [email or web address]. By joining [email or web address]’s FollowMyHealth portal, you will also be able to view your health information using other applications (apps) compatible with our system.

## 2023-01-26 NOTE — ED PROVIDER NOTE - PROGRESS NOTE DETAILS
Gustavo: Seen by urology.  Nephrostomy tubes not clamped upon arrival here and plan after procedure was for clamping of tubes for patient to urinate normally after stent placement.  Tubes clamped by urology, patient at this time urinating normally without nephrostomy output, and urology recommending dc.  Labs with slight leukocytosis, possibly reactive.  Lactate down trending.  + UA prior to procedure and sensitive to cipro which he was dc with - abx was not picked up since dc.  Given dose of iv abx here and sent cultures.  Instructed on need to take abx at home and verbalized understanding.   Offered admission for closer monitoring but patient and family definitely want to go home and monitor symptoms there.  Urology and myself discussed strict return precautions with patient and family at bedside.  Will follow up with Dr. Mathew and return for new or worsening symptoms.

## 2023-01-26 NOTE — ED ADULT TRIAGE NOTE - ESI TRIAGE ACUITY LEVEL, MLM
X Size Of Lesion In Cm (Optional): 0 Detail Level: Detailed Introduction Text (Please End With A Colon): The following procedure was deferred: 3

## 2023-01-26 NOTE — ED ADULT NURSE NOTE - OBJECTIVE STATEMENT
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  Yesterday had b/l uretal stent placements and nephrostomy tubes clamped.  Wife states had fever 100 orally this morning and minimal urethral urination and b/l nephrostomy tubes have been leaking.  Gave tylenol pta.  Patient only endorsing slight penile pain after procedure.

## 2023-01-26 NOTE — ED ADULT TRIAGE NOTE - CHIEF COMPLAINT QUOTE
c/o increased drainage from r nephrostomy tube site s/p b/l nephrostomy tubes placed yesterday c/o fever today tylenol taken at 0930 c/o pain at b/l sites and urethral pain secondary to catheterization yesterday

## 2023-01-26 NOTE — CHART NOTE - NSCHARTNOTEFT_GEN_A_CORE
58yo male with pmh HIV on HAART, rectal/prostate cancer s/p radiation with perforation of recum/anus s/p abdominoperitoneal resection with end colostomy, b/l ureteral stenosis with b/l nephrostomy tubes presenting with fever.  Yesterday had b/l ureteral stent placement and was instructed that he should unclamp nephrostomy tubes if that happens. Family member at bedside states tubes have been draining all night as it appears that cap covering tubes has a hole in their center, pt states she came to ed to evaluate tubes. Pt has not taken any of abx prescribed after procedure as they were unable to  at pharmacy and they returned to Ed.     Pt seen and examined at bedside. B/l nephrostomy draining into gauze, caps over tube allows for drainage, tubes were never clamped. Pt afebrile since arrival to ED. Discussed with Dr. Mathew, b/l tubes capped, pt and family instructed that now that nephrostomy tubes are clamped they should monitor for fevers/chills. Family member at bedside instructed how to unclamp and attach drainage bag should pt require it. Pt received ABX and bolus while in ED pt should cont abx as prescribed. Pt instructed to follow-up with Dr. Mathew in office.  plan discussed with Gustavo Chan

## 2023-01-26 NOTE — ED PROVIDER NOTE - NSFOLLOWUPINSTRUCTIONS_ED_ALL_ED_FT
Rest, drink plenty of fluids  Advance activity as tolerated  Continue all previously prescribed medications as directed  Follow up with your PMD - bring copies of your results  Return to the ER for fever, chills, decreased urination, chest pain, difficulty breathing, or other new or concerning symptoms  Take antibiotics as prescribed  Follow up with Dr. Mathew

## 2023-01-27 ENCOUNTER — NON-APPOINTMENT (OUTPATIENT)
Age: 58
End: 2023-01-27

## 2023-01-27 LAB
CULTURE RESULTS: NO GROWTH — SIGNIFICANT CHANGE UP
LACTATE SERPL-SCNC: 0.7 MMOL/L — SIGNIFICANT CHANGE UP (ref 0.7–2)
SPECIMEN SOURCE: SIGNIFICANT CHANGE UP
SURGICAL PATHOLOGY STUDY: SIGNIFICANT CHANGE UP

## 2023-01-30 ENCOUNTER — NON-APPOINTMENT (OUTPATIENT)
Age: 58
End: 2023-01-30

## 2023-01-31 DIAGNOSIS — I10 ESSENTIAL (PRIMARY) HYPERTENSION: ICD-10-CM

## 2023-01-31 DIAGNOSIS — Z72.0 TOBACCO USE: ICD-10-CM

## 2023-01-31 DIAGNOSIS — F41.9 ANXIETY DISORDER, UNSPECIFIED: ICD-10-CM

## 2023-01-31 DIAGNOSIS — Z85.118 PERSONAL HISTORY OF OTHER MALIGNANT NEOPLASM OF BRONCHUS AND LUNG: ICD-10-CM

## 2023-01-31 DIAGNOSIS — Z85.048 PERSONAL HISTORY OF OTHER MALIGNANT NEOPLASM OF RECTUM, RECTOSIGMOID JUNCTION, AND ANUS: ICD-10-CM

## 2023-01-31 DIAGNOSIS — Z21 ASYMPTOMATIC HUMAN IMMUNODEFICIENCY VIRUS [HIV] INFECTION STATUS: ICD-10-CM

## 2023-01-31 DIAGNOSIS — N20.1 CALCULUS OF URETER: ICD-10-CM

## 2023-01-31 DIAGNOSIS — N13.5 CROSSING VESSEL AND STRICTURE OF URETER WITHOUT HYDRONEPHROSIS: ICD-10-CM

## 2023-01-31 DIAGNOSIS — F32.A DEPRESSION, UNSPECIFIED: ICD-10-CM

## 2023-01-31 DIAGNOSIS — Z85.46 PERSONAL HISTORY OF MALIGNANT NEOPLASM OF PROSTATE: ICD-10-CM

## 2023-02-03 ENCOUNTER — APPOINTMENT (OUTPATIENT)
Dept: UROLOGY | Facility: CLINIC | Age: 58
End: 2023-02-03
Payer: MEDICAID

## 2023-02-03 VITALS
SYSTOLIC BLOOD PRESSURE: 132 MMHG | DIASTOLIC BLOOD PRESSURE: 87 MMHG | HEART RATE: 88 BPM | OXYGEN SATURATION: 98 % | TEMPERATURE: 98.1 F

## 2023-02-03 LAB — NIDUS STONE QN: SIGNIFICANT CHANGE UP

## 2023-02-03 PROCEDURE — 99212 OFFICE O/P EST SF 10 MIN: CPT

## 2023-02-03 PROCEDURE — 76775 US EXAM ABDO BACK WALL LIM: CPT

## 2023-02-03 NOTE — PHYSICAL EXAM
[General Appearance - Well Developed] : well developed [General Appearance - Well Nourished] : well nourished [Normal Appearance] : normal appearance [Well Groomed] : well groomed [General Appearance - In No Acute Distress] : no acute distress [Abdomen Soft] : soft [Abdomen Tenderness] : non-tender [Costovertebral Angle Tenderness] : no ~M costovertebral angle tenderness [Urethral Meatus] : meatus normal [FreeTextEntry1] : nephrostomies in place, clamped [Urinary Bladder Findings] : the bladder was normal on palpation [Scrotum] : the scrotum was normal [Testes Mass (___cm)] : there were no testicular masses [No Prostate Nodules] : no prostate nodules [Edema] : no peripheral edema [] : no respiratory distress [Respiration, Rhythm And Depth] : normal respiratory rhythm and effort [Exaggerated Use Of Accessory Muscles For Inspiration] : no accessory muscle use [Oriented To Time, Place, And Person] : oriented to person, place, and time [Affect] : the affect was normal [Mood] : the mood was normal [Not Anxious] : not anxious [Normal Station and Gait] : the gait and station were normal for the patient's age [No Focal Deficits] : no focal deficits [No Palpable Adenopathy] : no palpable adenopathy

## 2023-02-03 NOTE — HISTORY OF PRESENT ILLNESS
[FreeTextEntry1] : 58y/o male with recent bilateral hydronephrosis and nephrostomy positioning.\par Underwent bilateral stent placement with evidence of ureteral strictures.\par Goes home with clamped nephrostomies, comes for US + creat check.

## 2023-02-03 NOTE — ASSESSMENT
[FreeTextEntry1] : 56y/o male with recent bilateral hydronephrosis and nephrostomy positioning.\par Underwent bilateral stent placement with evidence of ureteral strictures.\par Goes home with clamped nephrostomies, comes for US + creat check.\par \par Last creat was OK.\par \par US renal show no hydro.\par Drawing blood for creatinine.\par \par will f/u to remove nephrostomies in case of regular creat.\par Then will change stents every 6 months + evaluation for Allium device.

## 2023-02-06 LAB
ALBUMIN SERPL ELPH-MCNC: 4.1 G/DL
ANION GAP SERPL CALC-SCNC: 11 MMOL/L
BUN SERPL-MCNC: 17 MG/DL
CALCIUM SERPL-MCNC: 9.7 MG/DL
CHLORIDE SERPL-SCNC: 104 MMOL/L
CO2 SERPL-SCNC: 26 MMOL/L
CREAT SERPL-MCNC: 0.84 MG/DL
EGFR: 102 ML/MIN/1.73M2
GLUCOSE SERPL-MCNC: 110 MG/DL
PHOSPHATE SERPL-MCNC: 3.8 MG/DL
POTASSIUM SERPL-SCNC: 4.3 MMOL/L
SODIUM SERPL-SCNC: 140 MMOL/L

## 2023-02-09 ENCOUNTER — LABORATORY RESULT (OUTPATIENT)
Age: 58
End: 2023-02-09

## 2023-02-09 ENCOUNTER — APPOINTMENT (OUTPATIENT)
Dept: INFECTIOUS DISEASE | Facility: CLINIC | Age: 58
End: 2023-02-09
Payer: MEDICAID

## 2023-02-09 ENCOUNTER — OUTPATIENT (OUTPATIENT)
Dept: OUTPATIENT SERVICES | Facility: HOSPITAL | Age: 58
LOS: 1 days | End: 2023-02-09
Payer: MEDICAID

## 2023-02-09 VITALS
BODY MASS INDEX: 28.14 KG/M2 | SYSTOLIC BLOOD PRESSURE: 139 MMHG | HEIGHT: 71 IN | WEIGHT: 201 LBS | DIASTOLIC BLOOD PRESSURE: 101 MMHG | HEART RATE: 103 BPM | OXYGEN SATURATION: 98 % | TEMPERATURE: 98 F

## 2023-02-09 DIAGNOSIS — K62.9 DISEASE OF ANUS AND RECTUM, UNSPECIFIED: Chronic | ICD-10-CM

## 2023-02-09 DIAGNOSIS — B20 HUMAN IMMUNODEFICIENCY VIRUS [HIV] DISEASE: ICD-10-CM

## 2023-02-09 DIAGNOSIS — Z93.3 COLOSTOMY STATUS: Chronic | ICD-10-CM

## 2023-02-09 LAB
4/8 RATIO: 0.31 RATIO — LOW (ref 0.9–3.6)
ABS CD8: 1452 CELLS/UL — HIGH (ref 142–740)
ALBUMIN SERPL ELPH-MCNC: 4.2 G/DL — SIGNIFICANT CHANGE UP (ref 3.3–5)
ALP SERPL-CCNC: 105 U/L — SIGNIFICANT CHANGE UP (ref 40–120)
ALT FLD-CCNC: 8 U/L — LOW (ref 10–45)
ANION GAP SERPL CALC-SCNC: 11 MMOL/L — SIGNIFICANT CHANGE UP (ref 5–17)
APPEARANCE UR: ABNORMAL
AST SERPL-CCNC: 14 U/L — SIGNIFICANT CHANGE UP (ref 10–40)
BACTERIA # UR AUTO: NEGATIVE — SIGNIFICANT CHANGE UP
BILIRUB SERPL-MCNC: 0.2 MG/DL — SIGNIFICANT CHANGE UP (ref 0.2–1.2)
BILIRUB UR-MCNC: NEGATIVE — SIGNIFICANT CHANGE UP
BUN SERPL-MCNC: 10 MG/DL — SIGNIFICANT CHANGE UP (ref 7–23)
CALCIUM SERPL-MCNC: 10.3 MG/DL — SIGNIFICANT CHANGE UP (ref 8.4–10.5)
CD3 BLASTS SPEC-ACNC: 1907 CELLS/UL — HIGH (ref 672–1870)
CD3 BLASTS SPEC-ACNC: 88 % — HIGH (ref 59–83)
CD4 %: 21 % — LOW (ref 30–62)
CD8 %: 67 % — HIGH (ref 12–36)
CHLORIDE SERPL-SCNC: 102 MMOL/L — SIGNIFICANT CHANGE UP (ref 96–108)
CO2 SERPL-SCNC: 28 MMOL/L — SIGNIFICANT CHANGE UP (ref 22–31)
COLOR SPEC: SIGNIFICANT CHANGE UP
CREAT SERPL-MCNC: 0.89 MG/DL — SIGNIFICANT CHANGE UP (ref 0.5–1.3)
DIFF PNL FLD: ABNORMAL
EGFR: 101 ML/MIN/1.73M2 — SIGNIFICANT CHANGE UP
EPI CELLS # UR: 3 /HPF — SIGNIFICANT CHANGE UP (ref 0–5)
GLUCOSE SERPL-MCNC: 107 MG/DL — HIGH (ref 70–99)
GLUCOSE UR QL: NEGATIVE — SIGNIFICANT CHANGE UP
HCT VFR BLD CALC: 39.8 % — SIGNIFICANT CHANGE UP (ref 39–50)
HCV AB S/CO SERPL IA: 2.27 S/CO — HIGH (ref 0–0.99)
HCV AB SERPL-IMP: ABNORMAL
HCV RNA FLD QL NAA+PROBE: SIGNIFICANT CHANGE UP
HCV RNA SPEC QL PROBE+SIG AMP: SIGNIFICANT CHANGE UP
HGB BLD-MCNC: 12.5 G/DL — LOW (ref 13–17)
HYALINE CASTS # UR AUTO: 1 /LPF — SIGNIFICANT CHANGE UP (ref 0–7)
KETONES UR-MCNC: NEGATIVE — SIGNIFICANT CHANGE UP
LEUKOCYTE ESTERASE UR-ACNC: ABNORMAL
MCHC RBC-ENTMCNC: 28.9 PG — SIGNIFICANT CHANGE UP (ref 27–34)
MCHC RBC-ENTMCNC: 31.4 GM/DL — LOW (ref 32–36)
MCV RBC AUTO: 92.1 FL — SIGNIFICANT CHANGE UP (ref 80–100)
NITRITE UR-MCNC: NEGATIVE — SIGNIFICANT CHANGE UP
PH UR: 7 — SIGNIFICANT CHANGE UP (ref 5–8)
PLATELET # BLD AUTO: 376 K/UL — SIGNIFICANT CHANGE UP (ref 150–400)
POTASSIUM SERPL-MCNC: 5 MMOL/L — SIGNIFICANT CHANGE UP (ref 3.5–5.3)
POTASSIUM SERPL-SCNC: 5 MMOL/L — SIGNIFICANT CHANGE UP (ref 3.5–5.3)
PROT SERPL-MCNC: 7 G/DL — SIGNIFICANT CHANGE UP (ref 6–8.3)
PROT UR-MCNC: ABNORMAL
RBC # BLD: 4.32 M/UL — SIGNIFICANT CHANGE UP (ref 4.2–5.8)
RBC # FLD: 15 % — HIGH (ref 10.3–14.5)
RBC CASTS # UR COMP ASSIST: 17 /HPF — HIGH (ref 0–4)
SODIUM SERPL-SCNC: 140 MMOL/L — SIGNIFICANT CHANGE UP (ref 135–145)
SP GR SPEC: 1 — LOW (ref 1.01–1.02)
T-CELL CD4 SUBSET PNL BLD: 447 CELLS/UL — LOW (ref 489–1457)
UROBILINOGEN FLD QL: SIGNIFICANT CHANGE UP
WBC # BLD: 7.99 K/UL — SIGNIFICANT CHANGE UP (ref 3.8–10.5)
WBC # FLD AUTO: 7.99 K/UL — SIGNIFICANT CHANGE UP (ref 3.8–10.5)
WBC UR QL: 13 /HPF — HIGH (ref 0–5)

## 2023-02-09 PROCEDURE — 86359 T CELLS TOTAL COUNT: CPT

## 2023-02-09 PROCEDURE — 99215 OFFICE O/P EST HI 40 MIN: CPT

## 2023-02-09 PROCEDURE — 87536 HIV-1 QUANT&REVRSE TRNSCRPJ: CPT

## 2023-02-09 PROCEDURE — 86360 T CELL ABSOLUTE COUNT/RATIO: CPT

## 2023-02-09 PROCEDURE — 87086 URINE CULTURE/COLONY COUNT: CPT

## 2023-02-09 PROCEDURE — 36415 COLL VENOUS BLD VENIPUNCTURE: CPT

## 2023-02-09 PROCEDURE — 86592 SYPHILIS TEST NON-TREP QUAL: CPT

## 2023-02-09 PROCEDURE — 86780 TREPONEMA PALLIDUM: CPT

## 2023-02-09 PROCEDURE — 86480 TB TEST CELL IMMUN MEASURE: CPT

## 2023-02-09 PROCEDURE — G0463: CPT

## 2023-02-09 PROCEDURE — 85027 COMPLETE CBC AUTOMATED: CPT

## 2023-02-09 PROCEDURE — 86803 HEPATITIS C AB TEST: CPT

## 2023-02-09 PROCEDURE — 81001 URINALYSIS AUTO W/SCOPE: CPT

## 2023-02-09 PROCEDURE — 80053 COMPREHEN METABOLIC PANEL: CPT

## 2023-02-09 PROCEDURE — 87521 HEPATITIS C PROBE&RVRS TRNSC: CPT

## 2023-02-09 NOTE — ASSESSMENT
[FreeTextEntry1] : 57 m with HTN, HIV diagnosed 1992 on Biktarvy,  prostate ca 2016 s/p radiation, anal cancer (dx 4/2021) s/p radiation and chemotherapy (last 8/2021) complicated by an ulcer, abscess and rectal bleeding\par s/p loop colostomy 3/30/22 and then s/p APR with end colostomy creation, VRAM flap closure, 8/24 found to have a large abscess as well, abscess cx with clostridium, bacteroides \par again was admitted for GIB and pelvic/groin abscess s/p drain placement and CT also showed L ischial tuberosity osteo so pt received zosyn in the hospital and was discharged with PO levo and flagyl to finish a 6 week course\par biktarvy was held during the hospitalization for some time in view of OR and GIB but now compliant \par he was admitted 11/2022 with hematuria and hydro, urine cx and AFB were negative, s/p b/l nephrostomy, and then had laser lithotripsy and stent placement 1/25/23, the nephrostomies were clamped\par pt is doing well, no fever, chills, nausea, vomiting but has some dysuria\par \par \par \par \par \par HIV on Biktarvy, VL:UD, CD4 511\par c/w biktarvy and repeat VL, CD4\par \par anal Ca s/p chemo and RT with rectal ulcer, abscess and bleed s/p loop colostomy 3/30/22 and then s/p APR with end colostomy creation, VRAM flap closure, 8/24 found to have a large abscess as well, abscess cx with clostridium, bacteroides \par again was admitted for GIB and pelvic/groin abscess s/p drain placement and CT also showed L ischial tuberosity osteo so pt received zosyn in the hospital and was discharged with PO levo and flagyl to finish a 6 week course\par biktarvy was held during the hospitalization for some time in view of OR and GIB but now compliant no fever, still has a drain plan for IR tube check tomorrow\par \par hematuria, hydro, nephrolithiasis \par admitted 11/2022 with hematuria and hydro, urine cx and AFB were negative, s/p b/l nephrostomy, and then had laser lithotripsy and stent placement 1/25/23, the nephrostomies were clamped\par pt is doing well, no fever, chills, nausea, vomiting but has some dysuria\par will check u/a and urine cx, he had negative blood and urine cx prior to procedure\par \par HTN, follows with medicine and medications were just adjusted but still has elevated BP, will follow with medicine\par \par HCM: \par will check annuals\par s/p flu and COVID vaccine\par s/p prevnar 2022\par f/u with dental and ophthalmology\par

## 2023-02-09 NOTE — HISTORY OF PRESENT ILLNESS
[FreeTextEntry1] : 57 m with HTN, HIV diagnosed 1992 on Biktarvy,  prostate ca 2016 s/p radiation, anal cancer (dx 4/2021) s/p radiation and chemotherapy (last 8/2021) complicated by an ulcer, abscess and rectal bleeding\par s/p loop colostomy 3/30/22 and then s/p APR with end colostomy creation, VRAM flap closure, 8/24 found to have a large abscess as well, abscess cx with clostridium, bacteroides \par again was admitted for GIB and pelvic/groin abscess s/p drain placement and CT also showed L ischial tuberosity osteo so pt received zosyn in the hospital and was discharged with PO levo and flagyl to finish a 6 week course\par biktarvy was held during the hospitalization for some time in view of OR and GIB but now compliant \par he was admitted 11/2022 with hematuria and hydro, urine cx and AFB were negative, s/p b/l nephrostomy, and then had laser lithotripsy and stent placement 1/25/23, the nephrostomies were clamped\par pt is doing well, no fever, chills, nausea, vomiting but has some dysuria\par \par \par \par

## 2023-02-09 NOTE — REVIEW OF SYSTEMS
[Fever] : no fever [Chills] : no chills [Eye Pain] : no eye pain [Red Eyes] : eyes not red [Earache] : no earache [Loss Of Hearing] : no hearing loss [Chest Pain] : no chest pain [Palpitations] : no palpitations [Shortness Of Breath] : no shortness of breath [Wheezing] : no wheezing [Abdominal Pain] : no abdominal pain [Vomiting] : no vomiting [Constipation] : constipation [Dysuria] : dysuria [Joint Swelling] : no joint swelling [Skin Lesions] : no skin lesions [Confused] : no confusion [Convulsions] : no convulsions [Suicidal] : not suicidal [Easy Bleeding] : no tendency for easy bleeding [Easy Bruising] : no tendency for easy bruising [Negative] : Heme/Lymph [FreeTextEntry7] : slight pain at ostomy

## 2023-02-09 NOTE — PHYSICAL EXAM
[General Appearance - Alert] : alert [General Appearance - In No Acute Distress] : in no acute distress [Sclera] : the sclera and conjunctiva were normal [Outer Ear] : the ears and nose were normal in appearance [Neck Appearance] : the appearance of the neck was normal [] : no respiratory distress [Auscultation Breath Sounds / Voice Sounds] : lungs were clear to auscultation bilaterally [Heart Sounds] : normal S1 and S2 [Edema] : there was no peripheral edema [Bowel Sounds] : normal bowel sounds [Abdomen Soft] : soft [Costovertebral Angle Tenderness] : no CVA tenderness [FreeTextEntry1] : clamped nephrostomies  [No Palpable Adenopathy] : no palpable adenopathy [Musculoskeletal - Swelling] : no joint swelling [No Focal Deficits] : no focal deficits [Affect] : the affect was normal

## 2023-02-10 ENCOUNTER — NON-APPOINTMENT (OUTPATIENT)
Age: 58
End: 2023-02-10

## 2023-02-10 LAB
CULTURE RESULTS: SIGNIFICANT CHANGE UP
SPECIMEN SOURCE: SIGNIFICANT CHANGE UP

## 2023-02-11 LAB
GAMMA INTERFERON BACKGROUND BLD IA-ACNC: 0.02 IU/ML — SIGNIFICANT CHANGE UP
HIV-1 VIRAL LOAD RESULT: SIGNIFICANT CHANGE UP
HIV1 RNA # SERPL NAA+PROBE: SIGNIFICANT CHANGE UP COPIES/ML
HIV1 RNA SER-IMP: SIGNIFICANT CHANGE UP
HIV1 RNA SERPL NAA+PROBE-ACNC: SIGNIFICANT CHANGE UP
HIV1 RNA SERPL NAA+PROBE-LOG#: SIGNIFICANT CHANGE UP LG COP/ML
M TB IFN-G BLD-IMP: NEGATIVE — SIGNIFICANT CHANGE UP
M TB IFN-G CD4+ BCKGRND COR BLD-ACNC: 0 IU/ML — SIGNIFICANT CHANGE UP
M TB IFN-G CD4+CD8+ BCKGRND COR BLD-ACNC: 0 IU/ML — SIGNIFICANT CHANGE UP
QUANT TB PLUS MITOGEN MINUS NIL: >10 IU/ML — SIGNIFICANT CHANGE UP

## 2023-02-12 LAB
RPR SERPL-ACNC: SIGNIFICANT CHANGE UP
T PALLIDUM AB TITR SER: POSITIVE
T PALLIDUM IGG SER QL IF: SIGNIFICANT CHANGE UP

## 2023-02-13 ENCOUNTER — APPOINTMENT (OUTPATIENT)
Dept: UROLOGY | Facility: CLINIC | Age: 58
End: 2023-02-13
Payer: MEDICAID

## 2023-02-13 VITALS
DIASTOLIC BLOOD PRESSURE: 89 MMHG | HEART RATE: 101 BPM | OXYGEN SATURATION: 98 % | TEMPERATURE: 97.3 F | SYSTOLIC BLOOD PRESSURE: 126 MMHG

## 2023-02-13 DIAGNOSIS — Z98.890 OTHER SPECIFIED POSTPROCEDURAL STATES: ICD-10-CM

## 2023-02-13 PROCEDURE — 50435Z: CUSTOM

## 2023-02-13 NOTE — HISTORY OF PRESENT ILLNESS
[FreeTextEntry1] : 56y/o male with recent bilateral hydronephrosis and nephrostomy positioning.\par Underwent bilateral stent placement with evidence of ureteral strictures.\par HAd clamped nephrostomies, comes for removal after creat check.

## 2023-02-13 NOTE — ASSESSMENT
[FreeTextEntry1] : 56y/o male with recent bilateral hydronephrosis and nephrostomy positioning.\par Underwent bilateral stent placement with evidence of ureteral strictures.\par Had clamped nephrostomies, comes for removal after creat check.\par \par On last check creat was stable. Nephrostomies are removed.\par \par Will change stents every 6 months + evaluation for Allium device.

## 2023-02-13 NOTE — PHYSICAL EXAM
[General Appearance - Well Developed] : well developed [General Appearance - Well Nourished] : well nourished [Normal Appearance] : normal appearance [Well Groomed] : well groomed [General Appearance - In No Acute Distress] : no acute distress [Abdomen Soft] : soft [Abdomen Tenderness] : non-tender [Costovertebral Angle Tenderness] : no ~M costovertebral angle tenderness [Urethral Meatus] : meatus normal [Urinary Bladder Findings] : the bladder was normal on palpation [Scrotum] : the scrotum was normal [Testes Mass (___cm)] : there were no testicular masses [No Prostate Nodules] : no prostate nodules [Edema] : no peripheral edema [] : no respiratory distress [Respiration, Rhythm And Depth] : normal respiratory rhythm and effort [Exaggerated Use Of Accessory Muscles For Inspiration] : no accessory muscle use [Oriented To Time, Place, And Person] : oriented to person, place, and time [Affect] : the affect was normal [Mood] : the mood was normal [Not Anxious] : not anxious [Normal Station and Gait] : the gait and station were normal for the patient's age [No Focal Deficits] : no focal deficits [No Palpable Adenopathy] : no palpable adenopathy [FreeTextEntry1] : nephrostomies in place, clamped

## 2023-02-15 DIAGNOSIS — K65.1 PERITONEAL ABSCESS: ICD-10-CM

## 2023-02-15 DIAGNOSIS — N20.0 CALCULUS OF KIDNEY: ICD-10-CM

## 2023-03-01 ENCOUNTER — NON-APPOINTMENT (OUTPATIENT)
Age: 58
End: 2023-03-01

## 2023-03-06 ENCOUNTER — APPOINTMENT (OUTPATIENT)
Dept: UROLOGY | Facility: CLINIC | Age: 58
End: 2023-03-06
Payer: MEDICAID

## 2023-03-06 VITALS
WEIGHT: 202 LBS | BODY MASS INDEX: 28.17 KG/M2 | OXYGEN SATURATION: 100 % | HEART RATE: 93 BPM | TEMPERATURE: 98 F | SYSTOLIC BLOOD PRESSURE: 127 MMHG | DIASTOLIC BLOOD PRESSURE: 92 MMHG

## 2023-03-06 PROCEDURE — 99213 OFFICE O/P EST LOW 20 MIN: CPT

## 2023-03-06 NOTE — ASSESSMENT
[FreeTextEntry1] : 56y/o male with recent bilateral hydronephrosis and nephrostomy positioning.\par Underwent bilateral stent placement with evidence of ureteral strictures.\par Had nephrostomies removed.\par \par Comes for back pain.\par \par On last check creat was stable. \par \par Performing US kidney + urine collection for culture + blood draw for renal panel.\par \par Findings: Bilateral simple cysts seen, all without flow. There is a 8 mm echogenic focus in the mid pole of the left kidney. Both kidneys are normal in size and echogenicity without solid masses visualized \par \par Ordering CT scan for renal stone hunt for eventual surgical evaluation.

## 2023-03-06 NOTE — HISTORY OF PRESENT ILLNESS
[FreeTextEntry1] : 56y/o male with recent bilateral hydronephrosis and nephrostomy positioning.\par Underwent bilateral stent placement with evidence of ureteral strictures.\par Had nephrostomies removed.\par \par Comes for back pain.\par \par

## 2023-03-06 NOTE — PHYSICAL EXAM
[General Appearance - Well Developed] : well developed [General Appearance - Well Nourished] : well nourished [Normal Appearance] : normal appearance [Well Groomed] : well groomed [General Appearance - In No Acute Distress] : no acute distress [Abdomen Soft] : soft [Abdomen Tenderness] : non-tender [Costovertebral Angle Tenderness] : no ~M costovertebral angle tenderness [Urethral Meatus] : meatus normal [Urinary Bladder Findings] : the bladder was normal on palpation [Scrotum] : the scrotum was normal [Testes Mass (___cm)] : there were no testicular masses [No Prostate Nodules] : no prostate nodules [Edema] : no peripheral edema [] : no respiratory distress [Exaggerated Use Of Accessory Muscles For Inspiration] : no accessory muscle use [Respiration, Rhythm And Depth] : normal respiratory rhythm and effort [Affect] : the affect was normal [Oriented To Time, Place, And Person] : oriented to person, place, and time [Mood] : the mood was normal [Not Anxious] : not anxious [Normal Station and Gait] : the gait and station were normal for the patient's age [No Focal Deficits] : no focal deficits [No Palpable Adenopathy] : no palpable adenopathy [FreeTextEntry1] : nephrostomies in place, clamped

## 2023-03-07 LAB
ALBUMIN SERPL ELPH-MCNC: 4.3 G/DL
ANION GAP SERPL CALC-SCNC: 16 MMOL/L
BUN SERPL-MCNC: 11 MG/DL
CALCIUM SERPL-MCNC: 10.2 MG/DL
CHLORIDE SERPL-SCNC: 104 MMOL/L
CO2 SERPL-SCNC: 22 MMOL/L
CREAT SERPL-MCNC: 0.87 MG/DL
EGFR: 101 ML/MIN/1.73M2
GLUCOSE SERPL-MCNC: 101 MG/DL
PHOSPHATE SERPL-MCNC: 4.6 MG/DL
POTASSIUM SERPL-SCNC: 4.8 MMOL/L
SODIUM SERPL-SCNC: 143 MMOL/L

## 2023-03-09 NOTE — PHYSICAL EXAM
[Normal] : normoactive bowel sounds, soft and nontender, no hepatosplenomegaly or masses appreciated
24

## 2023-03-13 ENCOUNTER — OUTPATIENT (OUTPATIENT)
Dept: OUTPATIENT SERVICES | Facility: HOSPITAL | Age: 58
LOS: 1 days | End: 2023-03-13
Payer: MEDICAID

## 2023-03-13 ENCOUNTER — APPOINTMENT (OUTPATIENT)
Dept: CT IMAGING | Facility: CLINIC | Age: 58
End: 2023-03-13
Payer: MEDICAID

## 2023-03-13 DIAGNOSIS — Z93.3 COLOSTOMY STATUS: Chronic | ICD-10-CM

## 2023-03-13 DIAGNOSIS — K62.9 DISEASE OF ANUS AND RECTUM, UNSPECIFIED: Chronic | ICD-10-CM

## 2023-03-13 DIAGNOSIS — M54.9 DORSALGIA, UNSPECIFIED: ICD-10-CM

## 2023-03-13 PROCEDURE — 74176 CT ABD & PELVIS W/O CONTRAST: CPT | Mod: 26

## 2023-03-13 PROCEDURE — 74176 CT ABD & PELVIS W/O CONTRAST: CPT

## 2023-03-17 ENCOUNTER — APPOINTMENT (OUTPATIENT)
Dept: UROLOGY | Facility: CLINIC | Age: 58
End: 2023-03-17
Payer: MEDICAID

## 2023-03-17 VITALS
OXYGEN SATURATION: 100 % | DIASTOLIC BLOOD PRESSURE: 91 MMHG | HEART RATE: 100 BPM | TEMPERATURE: 97.2 F | SYSTOLIC BLOOD PRESSURE: 126 MMHG

## 2023-03-17 PROCEDURE — 99213 OFFICE O/P EST LOW 20 MIN: CPT

## 2023-03-17 NOTE — ASSESSMENT
[FreeTextEntry1] : 58y/o male with recent bilateral hydronephrosis and nephrostomy positioning.\par Underwent bilateral stent placement with evidence of ureteral strictures.\par Had nephrostomies removed.\par \par Comes to discuss CT scan\par \par On last check creat was stable. \par \par On CT scan Mild new left hydronephrosis with stent in place. The ureter is not dilated but there are bilateral proximal periureteral soft tissue thickening/inflammatory changes, new since prior and indeterminate. No renal or ureteral calculi.\par \par No further treatment is needed DARIEN

## 2023-03-17 NOTE — PHYSICAL EXAM
[General Appearance - Well Developed] : well developed [General Appearance - Well Nourished] : well nourished [Normal Appearance] : normal appearance [Well Groomed] : well groomed [Abdomen Soft] : soft [General Appearance - In No Acute Distress] : no acute distress [Abdomen Tenderness] : non-tender [Costovertebral Angle Tenderness] : no ~M costovertebral angle tenderness [FreeTextEntry1] : nephrostomies in place, clamped [Urethral Meatus] : meatus normal [Urinary Bladder Findings] : the bladder was normal on palpation [Scrotum] : the scrotum was normal [No Prostate Nodules] : no prostate nodules [Testes Mass (___cm)] : there were no testicular masses [Edema] : no peripheral edema [] : no respiratory distress [Respiration, Rhythm And Depth] : normal respiratory rhythm and effort [Exaggerated Use Of Accessory Muscles For Inspiration] : no accessory muscle use [Oriented To Time, Place, And Person] : oriented to person, place, and time [Affect] : the affect was normal [Mood] : the mood was normal [Not Anxious] : not anxious [Normal Station and Gait] : the gait and station were normal for the patient's age [No Focal Deficits] : no focal deficits [No Palpable Adenopathy] : no palpable adenopathy

## 2023-03-17 NOTE — REVIEW OF SYSTEMS
[Chills] : chills [Feeling Tired] : feeling tired [Recent Weight Gain (___ Lbs)] : recent [unfilled] ~Ulb weight gain [Palpitations] : palpitations [Abdominal Pain] : abdominal pain [Loss of interest] : loss of interest in sexual activity [Constipation] : constipation [No erections] : no erections [Urine Infection (bladder/kidney)] : bladder/kidney infection [Blood in urine that you can see] : blood visible in urine [Told you have blood in urine on a urine test] : told blood was present in a urine test [History of kidney stones] : history of kidney stones [Wake up at night to urinate  How many times?  ___] : wakes up to urinate [unfilled] times during the night [Slow urine stream] : slow urine stream [Difficulty Walking] : difficulty walking [Bladder fullness after urinating] : bladder fullness after urinating [Anxiety] : anxiety [Depression] : depression [Hot Flashes] : hot flashes [Feelings Of Weakness] : feelings of weakness [Negative] : Heme/Lymph [FreeTextEntry3] : Strain urge to urinate causing leak of urine\par Dribbling of urine

## 2023-03-17 NOTE — HISTORY OF PRESENT ILLNESS
[FreeTextEntry1] : 58y/o male with recent bilateral hydronephrosis and nephrostomy positioning.\par Underwent bilateral stent placement with evidence of ureteral strictures.\par Had nephrostomies removed.\par \par Comes to discuss CT scan\par \par

## 2023-03-22 ENCOUNTER — RX RENEWAL (OUTPATIENT)
Age: 58
End: 2023-03-22

## 2023-03-22 ENCOUNTER — APPOINTMENT (OUTPATIENT)
Dept: CARDIOLOGY | Facility: CLINIC | Age: 58
End: 2023-03-22
Payer: MEDICAID

## 2023-03-22 VITALS
WEIGHT: 202 LBS | TEMPERATURE: 97.2 F | HEART RATE: 104 BPM | OXYGEN SATURATION: 100 % | SYSTOLIC BLOOD PRESSURE: 130 MMHG | DIASTOLIC BLOOD PRESSURE: 91 MMHG | BODY MASS INDEX: 28.28 KG/M2 | HEIGHT: 71 IN

## 2023-03-22 PROCEDURE — 99214 OFFICE O/P EST MOD 30 MIN: CPT

## 2023-03-22 RX ORDER — AMLODIPINE BESYLATE 5 MG/1
5 TABLET ORAL
Qty: 90 | Refills: 1 | Status: DISCONTINUED | COMMUNITY
Start: 2022-05-04 | End: 2023-03-22

## 2023-03-23 ENCOUNTER — RX RENEWAL (OUTPATIENT)
Age: 58
End: 2023-03-23

## 2023-03-28 ENCOUNTER — LABORATORY RESULT (OUTPATIENT)
Age: 58
End: 2023-03-28

## 2023-03-28 ENCOUNTER — APPOINTMENT (OUTPATIENT)
Dept: INTERNAL MEDICINE | Facility: CLINIC | Age: 58
End: 2023-03-28
Payer: MEDICAID

## 2023-03-28 VITALS
WEIGHT: 197 LBS | TEMPERATURE: 97.3 F | DIASTOLIC BLOOD PRESSURE: 81 MMHG | HEART RATE: 89 BPM | SYSTOLIC BLOOD PRESSURE: 113 MMHG | OXYGEN SATURATION: 98 % | HEIGHT: 71 IN | BODY MASS INDEX: 27.58 KG/M2

## 2023-03-28 PROCEDURE — 99396 PREV VISIT EST AGE 40-64: CPT | Mod: 25

## 2023-03-28 NOTE — ASSESSMENT
[FreeTextEntry1] : annual exam\par \par has urinary stents with hematuria- saw the urologist \par said it was the stents and needs to check cbc as he is losing blood and is fatigued\par \par BP- saw cardiology changed amlodipine to dilt\par \par \par Blood work drawn in office today\par

## 2023-03-28 NOTE — HISTORY OF PRESENT ILLNESS
[FreeTextEntry1] : annual exam\par  [de-identified] : annual exam\par \par has urinary stents with hematuria- saw the urologist \par said it was the stents and needs to check cbc as he is losing blood and is fatigued\par \par BP- saw cardiology changed amlodipine to dilt\par

## 2023-03-28 NOTE — HEALTH RISK ASSESSMENT
[Fair] : ~his/her~ current health as fair  [Good] : ~his/her~  mood as  good [No] : No [Yes] : In the past 12 months have you used drugs other than those required for medical reasons? Yes [No falls in past year] : Patient reported no falls in the past year [0] : 1) Little interest or pleasure doing things: Not at all (0) [1] : 2) Feeling down, depressed, or hopeless for several days (1) [With Significant Other] : lives with significant other [Unemployed] : unemployed [On disability] : on disability [] :  [Fully functional (bathing, dressing, toileting, transferring, walking, feeding)] : Fully functional (bathing, dressing, toileting, transferring, walking, feeding) [Fully functional (using the telephone, shopping, preparing meals, housekeeping, doing laundry, using] : Fully functional and needs no help or supervision to perform IADLs (using the telephone, shopping, preparing meals, housekeeping, doing laundry, using transportation, managing medications and managing finances) [Smoke Detector] : smoke detector [Carbon Monoxide Detector] : carbon monoxide detector [Safety elements used in home] : safety elements used in home [Seat Belt] :  uses seat belt [Sunscreen] : uses sunscreen [FreeTextEntry1] : pain/ blood in urine [PHQ-2 Negative - No further assessment needed] : PHQ-2 Negative - No further assessment needed [Audit-CScore] : 0 [de-identified] : weed [de-identified] : no [de-identified] : no appetite [[id number]] : 1 [Patient reported colonoscopy was normal] : Patient reported colonoscopy was normal [Change in mental status noted] : No change in mental status noted [Reports changes in hearing] : Reports no changes in hearing [Reports changes in vision] : Reports no changes in vision [Reports changes in dental health] : Reports no changes in dental health [ColonoscopyDate] : 2021 [ColonoscopyComments] : 2021 [Former] : Former [15-19] : 15-19 [< 15 Years] : < 15 Years [de-identified] : 1/2 ppd for 30year 15 year pack history

## 2023-03-29 LAB — BACTERIA UR CULT: NORMAL

## 2023-03-30 LAB
ALBUMIN SERPL ELPH-MCNC: 4.2 G/DL
ALP BLD-CCNC: 107 U/L
ALT SERPL-CCNC: 9 U/L
ANION GAP SERPL CALC-SCNC: 10 MMOL/L
APPEARANCE: ABNORMAL
AST SERPL-CCNC: 15 U/L
BASOPHILS # BLD AUTO: 0.05 K/UL
BASOPHILS NFR BLD AUTO: 0.7 %
BILIRUB SERPL-MCNC: <0.2 MG/DL
BILIRUBIN URINE: NEGATIVE
BLOOD URINE: ABNORMAL
BUN SERPL-MCNC: 14 MG/DL
CALCIUM SERPL-MCNC: 9.7 MG/DL
CHLORIDE SERPL-SCNC: 105 MMOL/L
CHOLEST SERPL-MCNC: 162 MG/DL
CO2 SERPL-SCNC: 27 MMOL/L
COLOR: ABNORMAL
CREAT SERPL-MCNC: 1 MG/DL
EGFR: 88 ML/MIN/1.73M2
EOSINOPHIL # BLD AUTO: 0.09 K/UL
EOSINOPHIL NFR BLD AUTO: 1.2 %
ESTIMATED AVERAGE GLUCOSE: 117 MG/DL
FERRITIN SERPL-MCNC: 213 NG/ML
FOLATE SERPL-MCNC: >20 NG/ML
GLUCOSE QUALITATIVE U: NEGATIVE
GLUCOSE SERPL-MCNC: 108 MG/DL
HBA1C MFR BLD HPLC: 5.7 %
HCT VFR BLD CALC: 42.3 %
HDLC SERPL-MCNC: 35 MG/DL
HGB BLD-MCNC: 13.1 G/DL
IMM GRANULOCYTES NFR BLD AUTO: 0.3 %
IRON SATN MFR SERPL: 16 %
IRON SERPL-MCNC: 47 UG/DL
KETONES URINE: NEGATIVE
LDLC SERPL CALC-MCNC: 81 MG/DL
LEUKOCYTE ESTERASE URINE: ABNORMAL
LYMPHOCYTES # BLD AUTO: 2.75 K/UL
LYMPHOCYTES NFR BLD AUTO: 36.2 %
MAN DIFF?: NORMAL
MCHC RBC-ENTMCNC: 29.6 PG
MCHC RBC-ENTMCNC: 31 GM/DL
MCV RBC AUTO: 95.7 FL
MONOCYTES # BLD AUTO: 0.8 K/UL
MONOCYTES NFR BLD AUTO: 10.5 %
NEUTROPHILS # BLD AUTO: 3.88 K/UL
NEUTROPHILS NFR BLD AUTO: 51.1 %
NITRITE URINE: NEGATIVE
NONHDLC SERPL-MCNC: 128 MG/DL
PH URINE: 7
PLATELET # BLD AUTO: 329 K/UL
POTASSIUM SERPL-SCNC: 4.4 MMOL/L
PROT SERPL-MCNC: 6.9 G/DL
PROTEIN URINE: ABNORMAL
RBC # BLD: 4.42 M/UL
RBC # FLD: 13.7 %
SODIUM SERPL-SCNC: 141 MMOL/L
SPECIFIC GRAVITY URINE: 1.01
TIBC SERPL-MCNC: 289 UG/DL
TRIGL SERPL-MCNC: 230 MG/DL
TSH SERPL-ACNC: 3.08 UIU/ML
UIBC SERPL-MCNC: 242 UG/DL
UROBILINOGEN URINE: NORMAL
VIT B12 SERPL-MCNC: 561 PG/ML
WBC # FLD AUTO: 7.59 K/UL

## 2023-04-12 ENCOUNTER — EMERGENCY (EMERGENCY)
Facility: HOSPITAL | Age: 58
LOS: 0 days | Discharge: HOME | End: 2023-04-12
Attending: EMERGENCY MEDICINE
Payer: MEDICAID

## 2023-04-12 ENCOUNTER — NON-APPOINTMENT (OUTPATIENT)
Age: 58
End: 2023-04-12

## 2023-04-12 VITALS
HEART RATE: 71 BPM | RESPIRATION RATE: 17 BRPM | TEMPERATURE: 97 F | OXYGEN SATURATION: 97 % | DIASTOLIC BLOOD PRESSURE: 92 MMHG | SYSTOLIC BLOOD PRESSURE: 150 MMHG

## 2023-04-12 VITALS
HEART RATE: 100 BPM | OXYGEN SATURATION: 98 % | HEIGHT: 71 IN | WEIGHT: 203.05 LBS | RESPIRATION RATE: 18 BRPM | TEMPERATURE: 98 F | DIASTOLIC BLOOD PRESSURE: 82 MMHG | SYSTOLIC BLOOD PRESSURE: 113 MMHG

## 2023-04-12 DIAGNOSIS — C61 MALIGNANT NEOPLASM OF PROSTATE: ICD-10-CM

## 2023-04-12 DIAGNOSIS — F41.9 ANXIETY DISORDER, UNSPECIFIED: ICD-10-CM

## 2023-04-12 DIAGNOSIS — F32.A DEPRESSION, UNSPECIFIED: ICD-10-CM

## 2023-04-12 DIAGNOSIS — Z20.822 CONTACT WITH AND (SUSPECTED) EXPOSURE TO COVID-19: ICD-10-CM

## 2023-04-12 DIAGNOSIS — I10 ESSENTIAL (PRIMARY) HYPERTENSION: ICD-10-CM

## 2023-04-12 DIAGNOSIS — K62.9 DISEASE OF ANUS AND RECTUM, UNSPECIFIED: Chronic | ICD-10-CM

## 2023-04-12 DIAGNOSIS — Z96.0 PRESENCE OF UROGENITAL IMPLANTS: ICD-10-CM

## 2023-04-12 DIAGNOSIS — Z87.19 PERSONAL HISTORY OF OTHER DISEASES OF THE DIGESTIVE SYSTEM: ICD-10-CM

## 2023-04-12 DIAGNOSIS — Z93.3 COLOSTOMY STATUS: ICD-10-CM

## 2023-04-12 DIAGNOSIS — Z93.3 COLOSTOMY STATUS: Chronic | ICD-10-CM

## 2023-04-12 DIAGNOSIS — Z85.048 PERSONAL HISTORY OF OTHER MALIGNANT NEOPLASM OF RECTUM, RECTOSIGMOID JUNCTION, AND ANUS: ICD-10-CM

## 2023-04-12 DIAGNOSIS — N39.0 URINARY TRACT INFECTION, SITE NOT SPECIFIED: ICD-10-CM

## 2023-04-12 DIAGNOSIS — B20 HUMAN IMMUNODEFICIENCY VIRUS [HIV] DISEASE: ICD-10-CM

## 2023-04-12 DIAGNOSIS — R10.9 UNSPECIFIED ABDOMINAL PAIN: ICD-10-CM

## 2023-04-12 DIAGNOSIS — Z87.442 PERSONAL HISTORY OF URINARY CALCULI: ICD-10-CM

## 2023-04-12 DIAGNOSIS — E78.5 HYPERLIPIDEMIA, UNSPECIFIED: ICD-10-CM

## 2023-04-12 DIAGNOSIS — Z85.118 PERSONAL HISTORY OF OTHER MALIGNANT NEOPLASM OF BRONCHUS AND LUNG: ICD-10-CM

## 2023-04-12 LAB
ALBUMIN SERPL ELPH-MCNC: 3.2 G/DL — LOW (ref 3.3–5)
ALP SERPL-CCNC: 94 U/L — SIGNIFICANT CHANGE UP (ref 40–120)
ALT FLD-CCNC: 16 U/L — SIGNIFICANT CHANGE UP (ref 12–78)
AMPHET UR-MCNC: NEGATIVE — SIGNIFICANT CHANGE UP
ANION GAP SERPL CALC-SCNC: 0 MMOL/L — LOW (ref 5–17)
APAP SERPL-MCNC: < 2 UG/ML (ref 10–30)
APPEARANCE UR: ABNORMAL
AST SERPL-CCNC: 20 U/L — SIGNIFICANT CHANGE UP (ref 15–37)
BACTERIA # UR AUTO: ABNORMAL
BARBITURATES UR SCN-MCNC: NEGATIVE — SIGNIFICANT CHANGE UP
BASOPHILS # BLD AUTO: 0.04 K/UL — SIGNIFICANT CHANGE UP (ref 0–0.2)
BASOPHILS NFR BLD AUTO: 0.7 % — SIGNIFICANT CHANGE UP (ref 0–2)
BENZODIAZ UR-MCNC: POSITIVE — SIGNIFICANT CHANGE UP
BILIRUB SERPL-MCNC: 0.3 MG/DL — SIGNIFICANT CHANGE UP (ref 0.2–1.2)
BILIRUB UR-MCNC: NEGATIVE — SIGNIFICANT CHANGE UP
BUN SERPL-MCNC: 15 MG/DL — SIGNIFICANT CHANGE UP (ref 7–23)
CALCIUM SERPL-MCNC: 9.3 MG/DL — SIGNIFICANT CHANGE UP (ref 8.5–10.1)
CHLORIDE SERPL-SCNC: 111 MMOL/L — HIGH (ref 96–108)
CO2 SERPL-SCNC: 31 MMOL/L — SIGNIFICANT CHANGE UP (ref 22–31)
COCAINE METAB.OTHER UR-MCNC: NEGATIVE — SIGNIFICANT CHANGE UP
COLOR SPEC: YELLOW — SIGNIFICANT CHANGE UP
CREAT SERPL-MCNC: 0.9 MG/DL — SIGNIFICANT CHANGE UP (ref 0.5–1.3)
DIFF PNL FLD: ABNORMAL
EGFR: 100 ML/MIN/1.73M2 — SIGNIFICANT CHANGE UP
EOSINOPHIL # BLD AUTO: 0.07 K/UL — SIGNIFICANT CHANGE UP (ref 0–0.5)
EOSINOPHIL NFR BLD AUTO: 1.2 % — SIGNIFICANT CHANGE UP (ref 0–6)
ETHANOL SERPL-MCNC: <10 MG/DL — SIGNIFICANT CHANGE UP (ref 0–10)
FLUAV AG NPH QL: SIGNIFICANT CHANGE UP
FLUBV AG NPH QL: SIGNIFICANT CHANGE UP
GLUCOSE SERPL-MCNC: 107 MG/DL — HIGH (ref 70–99)
GLUCOSE UR QL: NEGATIVE MG/DL — SIGNIFICANT CHANGE UP
HCT VFR BLD CALC: 36.5 % — LOW (ref 39–50)
HGB BLD-MCNC: 12 G/DL — LOW (ref 13–17)
IMM GRANULOCYTES NFR BLD AUTO: 0.5 % — SIGNIFICANT CHANGE UP (ref 0–0.9)
KETONES UR-MCNC: NEGATIVE — SIGNIFICANT CHANGE UP
LEUKOCYTE ESTERASE UR-ACNC: ABNORMAL
LYMPHOCYTES # BLD AUTO: 1.63 K/UL — SIGNIFICANT CHANGE UP (ref 1–3.3)
LYMPHOCYTES # BLD AUTO: 27.3 % — SIGNIFICANT CHANGE UP (ref 13–44)
MCHC RBC-ENTMCNC: 29 PG — SIGNIFICANT CHANGE UP (ref 27–34)
MCHC RBC-ENTMCNC: 32.9 G/DL — SIGNIFICANT CHANGE UP (ref 32–36)
MCV RBC AUTO: 88.2 FL — SIGNIFICANT CHANGE UP (ref 80–100)
METHADONE UR-MCNC: NEGATIVE — SIGNIFICANT CHANGE UP
MONOCYTES # BLD AUTO: 0.76 K/UL — SIGNIFICANT CHANGE UP (ref 0–0.9)
MONOCYTES NFR BLD AUTO: 12.8 % — SIGNIFICANT CHANGE UP (ref 2–14)
NEUTROPHILS # BLD AUTO: 3.43 K/UL — SIGNIFICANT CHANGE UP (ref 1.8–7.4)
NEUTROPHILS NFR BLD AUTO: 57.5 % — SIGNIFICANT CHANGE UP (ref 43–77)
NITRITE UR-MCNC: NEGATIVE — SIGNIFICANT CHANGE UP
NRBC # BLD: 0 /100 WBCS — SIGNIFICANT CHANGE UP (ref 0–0)
OPIATES UR-MCNC: NEGATIVE — SIGNIFICANT CHANGE UP
PCP SPEC-MCNC: SIGNIFICANT CHANGE UP
PCP UR-MCNC: NEGATIVE — SIGNIFICANT CHANGE UP
PH UR: 7 — SIGNIFICANT CHANGE UP (ref 5–8)
PLATELET # BLD AUTO: 262 K/UL — SIGNIFICANT CHANGE UP (ref 150–400)
POTASSIUM SERPL-MCNC: 4.5 MMOL/L — SIGNIFICANT CHANGE UP (ref 3.5–5.3)
POTASSIUM SERPL-SCNC: 4.5 MMOL/L — SIGNIFICANT CHANGE UP (ref 3.5–5.3)
PROT SERPL-MCNC: 6.9 GM/DL — SIGNIFICANT CHANGE UP (ref 6–8.3)
PROT UR-MCNC: 100 MG/DL
RBC # BLD: 4.14 M/UL — LOW (ref 4.2–5.8)
RBC # FLD: 13.4 % — SIGNIFICANT CHANGE UP (ref 10.3–14.5)
RBC CASTS # UR COMP ASSIST: >50 /HPF (ref 0–4)
SALICYLATES SERPL-MCNC: 3.1 MG/DL — SIGNIFICANT CHANGE UP (ref 2.8–20)
SARS-COV-2 RNA SPEC QL NAA+PROBE: SIGNIFICANT CHANGE UP
SODIUM SERPL-SCNC: 142 MMOL/L — SIGNIFICANT CHANGE UP (ref 135–145)
SP GR SPEC: 1.01 — SIGNIFICANT CHANGE UP (ref 1.01–1.02)
THC UR QL: POSITIVE — SIGNIFICANT CHANGE UP
UROBILINOGEN FLD QL: NEGATIVE MG/DL — SIGNIFICANT CHANGE UP
WBC # BLD: 5.96 K/UL — SIGNIFICANT CHANGE UP (ref 3.8–10.5)
WBC # FLD AUTO: 5.96 K/UL — SIGNIFICANT CHANGE UP (ref 3.8–10.5)
WBC UR QL: >50

## 2023-04-12 PROCEDURE — 71045 X-RAY EXAM CHEST 1 VIEW: CPT | Mod: 26

## 2023-04-12 PROCEDURE — 90792 PSYCH DIAG EVAL W/MED SRVCS: CPT | Mod: 1L,95

## 2023-04-12 PROCEDURE — 93010 ELECTROCARDIOGRAM REPORT: CPT

## 2023-04-12 PROCEDURE — 99284 EMERGENCY DEPT VISIT MOD MDM: CPT

## 2023-04-12 PROCEDURE — 74176 CT ABD & PELVIS W/O CONTRAST: CPT | Mod: 26,MA

## 2023-04-12 RX ORDER — CIPROFLOXACIN LACTATE 400MG/40ML
500 VIAL (ML) INTRAVENOUS ONCE
Refills: 0 | Status: COMPLETED | OUTPATIENT
Start: 2023-04-12 | End: 2023-04-12

## 2023-04-12 RX ORDER — CIPROFLOXACIN LACTATE 400MG/40ML
2 VIAL (ML) INTRAVENOUS
Qty: 20 | Refills: 0
Start: 2023-04-12 | End: 2023-04-21

## 2023-04-12 RX ADMIN — Medication 500 MILLIGRAM(S): at 19:14

## 2023-04-12 NOTE — ED PROVIDER NOTE - PATIENT PORTAL LINK FT
You can access the FollowMyHealth Patient Portal offered by [facility] by registering at the following website: [email or web address]. By joining [facility]’s FollowMyHealth portal, you will also be able to view your health information using other applications (apps) compatible with our system.

## 2023-04-12 NOTE — ED BEHAVIORAL HEALTH ASSESSMENT NOTE - HPI (INCLUDE ILLNESS QUALITY, SEVERITY, DURATION, TIMING, CONTEXT, MODIFYING FACTORS, ASSOCIATED SIGNS AND SYMPTOMS)
The patient is a 56 yo male, domiciled with wife, with PMH HIV, rectal/prostate cancer s/p radiation c/b perforation s/p abdominoperitoneal resection with end colostomy, uretal stenosis s/p stenting, and psychiatric history of MDD and unspecified anxiety, one prior psychiatric hospitalization approx 5y ago, no previous suicide attempts, engaged in outpatient treatment w/ Dr. Yin, presenting with wife at recommendation of GI surgeon due to acute abdominal pain, weakness, dizziness for 4 days. Psych consulted for depression.    On interview the patient is very pleasant and cooperative. He denies any sort of suicidal ideation or hopelessness. He reports coming primarily for the physical symptoms and describes being in a lot of pain, including have significant pelvic pain while walking. He is still able to ambulate himself. Patient does endorse increased symptoms of depression in the past year since his medical condition got worse. He reports anhedonia, decreased appetite for several months, insomnia w/ multiple nightime awakenings, and increased overall anxiety (describes mostly internal restlessness). However, he very firmly denies any SI and is maintaining overall self-care and interest in getting better. He is compliant with outpatient treatment.    I spoke to patient's wife at bedside who also corroborates that they presented today after speaking to the surgeon who established the patient's colostomy in November. Wife acknowledges patient is going through depression but denies any concern about any suicidal ideation. She feels totally safe with patient returning home once medically treated, and when asked what she thinks would be most helpful psychiatrically, says outpatient therapy. She requests referrals.

## 2023-04-12 NOTE — ED ADULT NURSE REASSESSMENT NOTE - NS ED NURSE REASSESS COMMENT FT1
Patient alert and verbally responsive, no noted distress.  Noted drinking coffee with wife. Stable.
A&Ox4, pt resting in bed with wife at bedside. Pt calm, co-operative and interactive. Pt denies any SI/HI.

## 2023-04-12 NOTE — ED PROVIDER NOTE - OBJECTIVE STATEMENT
57-year-old male complaining of abdominal pain today patient has multiple medical problems rectal cancer and prostate cancer undergoing treatment for prostate cancer patient had treatment for rectal cancer which resulted in a permanent colostomy.  Patient also has bilateral ureteral stents placed by Dr. Mathew here at [facility].  Patient also complains of deep depressed mood as per wife no suicidal ideation.

## 2023-04-12 NOTE — ED ADULT TRIAGE NOTE - HAVE YOU RECEIVED AT LEAST TWO PFIZER AND/OR MODERNA VACCINATIONS (IN ANY COMBINATION) AND/OR ONE JOHNSON & JOHNSON VACCINATION?
Yes PAST MEDICAL HISTORY:  Arthritis     Diabetes     Hepatitis Untreated Hepatitis C    HTN (hypertension)

## 2023-04-12 NOTE — ED BEHAVIORAL HEALTH ASSESSMENT NOTE - OTHER PAST PSYCHIATRIC HISTORY (INCLUDE DETAILS REGARDING ONSET, COURSE OF ILLNESS, INPATIENT/OUTPATIENT TREATMENT)
has one lifetime hospitalization for severe depressive episode  no suicide attempts  has been seeing psychiatrist Dr. Yin close to 7 years

## 2023-04-12 NOTE — ED PROVIDER NOTE - CARE PLAN
1 Assessment and plan of treatment:	Patient underwent psychiatric evaluation and evaluation for abdominal pain.  Patient had findings of infected urine.  In light of instrumentation by urology patient will have urology consult and CAT scan   Principal Discharge DX:	Acute UTI  Assessment and plan of treatment:	Patient underwent psychiatric evaluation and evaluation for abdominal pain.  Patient had findings of infected urine.  In light of instrumentation by urology patient will have urology consult and CAT scan

## 2023-04-12 NOTE — ED ADULT NURSE NOTE - OBJECTIVE STATEMENT
Patient noted with c/o abdominal pain and constipation for passed 3 days. pt has a colostomy. No vomiting or nausea. As per wife, pt is expressing suicidal thoughts at home. Notes that he has been taking a lot of different medication and worries about an over dose. Pt seems depressed about current medical condition. Placed on close observation to safety.

## 2023-04-12 NOTE — ED BEHAVIORAL HEALTH ASSESSMENT NOTE - SUMMARY
The patient is a 58 yo male, domiciled with wife, with PMH HIV, rectal/prostate cancer s/p radiation c/b perforation s/p abdominoperitoneal resection with end colostomy, uretal stenosis s/p stenting, and psychiatric history of MDD and unspecified anxiety, one prior psychiatric hospitalization approx 5y ago, no previous suicide attempts, engaged in outpatient treatment w/ Dr. Yin, presenting with wife at recommendation of GI surgeon due to acute abdominal pain, weakness, dizziness for 4 days. Psych consulted for depression.    Patient does endorse symptoms consistent with depression (anhedonia, sleep disturbance, decreased appetite, fatigue, anxiety), in the context of accumulating medical issues and acute pain, although some of these factors are difficult to distinguish in etiology from medical causes as patient has diagnosis of cancer. Regardless, the patient is denying hopelessness or SI, exhibits good insight into his condition and motivation for treatment, demonstrates good impulse control, overall has intact functioning, has a good support system, and is engaged in outpatient care. Wife corroborates all of these and denies any safety concerns, and thinks patient would most benefit from outpatient therapy. The patient also has no history of any past self-harm. He does not require psychiatric hospitalization at this time.    Plan:  - psychiatrically cleared  - continue with medical treatment  - pt will f/u with outpatient psychiatrist. Additional resources also provided to explore therapy options.

## 2023-04-12 NOTE — ED ADULT TRIAGE NOTE - CHIEF COMPLAINT QUOTE
pt c/o diffuse abdominal pain and constipation for 3 days. pt has a colostomy. denies nausea or vomiting . As per wife, pt is expressing suicidal thoughts at home. she states, "he is taking a lot of medication and I'm concerned about a possible overdose." pt denies these claims.  history of HIV and mood disorder.

## 2023-04-12 NOTE — ED BEHAVIORAL HEALTH ASSESSMENT NOTE - NS_RISKASSESSMENTINTER_PSY_ALL_CORE
[FreeTextEntry1] : 58 year old woman with chronic insomnia and possible JAYSHREE, obesity and poorly controlled HTN here for evaluation of sleep problems.\par \par Insomnia - chronic greater than 30 years problems both with sleep initiation and sleep maintenance. Does not report day time sleepiness feels like she wants to keep moving. However she does report dozing off at work after she eats dinner. Has never tried sleeping pills and is not interested in them at this time. Possible associated anxiety, "nerves" feels that her mind is constantly going and that is why she cannot go to sleep. I have recommended CBT and given her information on how to schedule an appointment in addition. \par Reviewed sleep hygiene techniques with her including exercise, decreasing caffeine and worrying in bed.\par \par JAYSHREE - she is at risk for JAYSHREE given that she has HTN and morbid obesity I have requested a home sleep study to make the diagnosis.\par \par Obesity - the patient has managed to lose over 20 lbs with change in her diet. I have given her information for the [facility] weight management center for her to follow up. \par \par BP - elevated BP today patient denies any symptoms, reports that she did not take her medications today and that her BP is always that high. \par \par follow up after her sleep study is complete. Low Acute Suicide Risk

## 2023-04-12 NOTE — ED PROVIDER NOTE - CLINICAL SUMMARY MEDICAL DECISION MAKING FREE TEXT BOX
Patient is awaiting CT scan results Patient is awaiting CT scan results    Patient went extensive work-up including CT scan psychiatric evaluation urology consult.  Noted to have UTI with bilateral ureteral stents in place.

## 2023-04-12 NOTE — CONSULT NOTE ADULT - SUBJECTIVE AND OBJECTIVE BOX
HPI:  56 yo M with HIV (on Biktarvy), htn, prostate CA (s/p RT), anal CA (s/p RT/chemo) s/p colostomy, renal stones s/p b/l ureteral stent placement on 2023 with Dr. Mathew, now presenting with abdominal pain. Per patient, his pain began 4 days ago and is located on the right side of his abdomen radiating to R flank. Admits to intermittent hematuria since stent placement. Patient denies any fever, chills, N/V/D, CP, SOB.     PAST MEDICAL & SURGICAL HISTORY:  HTN (hypertension)      HLD (hyperlipidemia)      HIV infection      Depressive disorder      Anxiety      Prostate cancer      Anal cancer      Diverticulosis      Lung cancer      Anal lesion      S/P colostomy      Review of Systems:  contained within HPI    MEDICATIONS  (STANDING):    MEDICATIONS  (PRN):      Allergies    No Known Allergies    Intolerances        SOCIAL HISTORY          Smoking: Yes [ ]  No [X]   ______pk yrs          ETOH  Yes [ ]  No [X]  Social [ ]          DRUGS:  Yes [ ]  No [X]  if so what______________    FAMILY HISTORY:  FH: prostate cancer    FH: breast cancer    Vital Signs Last 24 Hrs  T(C): 36.7 (2023 15:44), Max: 36.7 (2023 15:44)  T(F): 98.1 (2023 15:44), Max: 98.1 (2023 15:44)  HR: 71 (2023 15:44) (71 - 100)  BP: 107/72 (2023 15:44) (107/72 - 113/82)  RR: 18 (2023 15:44) (18 - 18)  SpO2: 98% (2023 15:44) (98% - 98%)    Parameters below as of 2023 11:45  Patient On (Oxygen Delivery Method): room air    Physical Exam:  General:  Appears stated age, well-groomed, well-nourished, no distress  Eyes: CHRISTOPHER  HENT: WNL, no JVD  Chest: clear breath sounds  Cardiovascular: Regular rate & rhythm  Abdomen: soft, minimally tender at RLQ, non distended. Colostomy pink and viable, minimal brown stool in bag with no noted air  : no suprapubic tenderness or distention  Extremities: non edematous extremities  Skin: No rash  Musculoskeletal: normal strength  Neuro/Psych: AAO x3      LABS:                        12.0   5.96  )-----------( 262      ( 2023 12:37 )             36.5     04-12    142  |  111<H>  |  15  ----------------------------<  107<H>  4.5   |  31  |  0.90    Ca    9.3      2023 12:37    TPro  6.9  /  Alb  3.2<L>  /  TBili  0.3  /  DBili  x   /  AST  20  /  ALT  16  /  AlkPhos  94        Urinalysis Basic - ( 2023 12:30 )    Color: Yellow / Appearance: Slightly Turbid / S.010 / pH: x  Gluc: x / Ketone: Negative  / Bili: Negative / Urobili: Negative mg/dL   Blood: x / Protein: 100 mg/dL / Nitrite: Negative   Leuk Esterase: Moderate / RBC: >50 /HPF / WBC >50   Sq Epi: x / Non Sq Epi: x / Bacteria: Many    RADIOLOGY & ADDITIONAL STUDIES:  < from: CT Abdomen and Pelvis No Cont (23 @ 18:17) >  ACC: [id number] EXAM:  CT ABDOMEN AND PELVIS   ORDERED BY: MADINA ELAM     PROCEDURE DATE:  2023          INTERPRETATION:  CLINICAL INFORMATION: Generalized abdominal pain    COMPARISON: CT abdomen and pelvis 3/13/2023    CONTRAST/COMPLICATIONS:  IV Contrast: NONE  Oral Contrast: NONE  Complications: None reported at time of study completion    PROCEDURE:  CT of the Abdomen and Pelvis was performed.  Sagittal and coronal reformats were performed.    FINDINGS:  LOWER CHEST: Clear.    LIVER: Normal.  BILE DUCTS: Nondilated.  GALLBLADDER: Normal.  SPLEEN: Normal.  PANCREAS: Normal.  ADRENALS: Normal.  KIDNEYS/URETERS: Bilateral ureteral stents in place. No urinary tract   calculi. Mild right hydronephrosis and moderate left hydronephrosis is   slightly worse compared to one month prior. Persistent perinephric and   periureteral stranding.    BLADDER: Underdistended limiting evaluation.  REPRODUCTIVE ORGANS: Not well seen.    BOWEL: Status post APR and left lower quadrant colostomy.Moderate fecal   retention in the colon. No bowel obstruction. Normal appendix.  PERITONEUM: No free air or ascites.  VESSELS: Normal caliber aorta. IVC filter.  RETROPERITONEUM/LYMPH NODES: No adenopathy.  ABDOMINAL WALL: Postsurgical changes.  BONES: No acute bony abnormality.    IMPRESSION:  *  Bilateral ureteral stents in place. No urinary tract calculi. Mild   right hydronephrosis and moderate left hydronephrosis is slightly worse   compared to one month prior.  *  Persistent perinephric and periureteral stranding. Correlate for   urinary tract infection.    A/P  56 yo M with HIV (on Biktarvy), htn, prostate CA (s/p RT), anal CA (s/p RT/chemo) s/p colostomy, renal stones s/p b/l ureteral stent placement on 2023 with Dr. Mathew, now presenting with abdominal pain.  Bilateral ureteral stents in place with no stones  WBC and Cr WNL. Afebrile    - abx per ED  - OP f/u with Dr. Mathew  - no  intervention at this time  - d/w Dr. Mathew

## 2023-04-12 NOTE — ED PROVIDER NOTE - PHYSICAL EXAMINATION
Vázquez:  General: No distress.  Mentation at baseline.   HEENT: WNL  Chest/Lungs: CTAB, No wheeze, No retractions, No increased work of breathing, Normal rate  Heart: S1S2 RRR, No M/R/G, Pules equal Bilaterally in upper and lower extremities distally  Abd: soft, NT/ND, No guarding, No rebound.  No hernias, no palpable masses.  Extrem: FROM in all joints, no significant edema noted, No ulcers.  Cap refil < 2sec.  Skin: No rash noted, warm dry.  Neuro:  Grossly normal.  No difficulty ambulating. No focal deficits.  Psychiatric: No evidence of delusions. No SI/HI.

## 2023-04-12 NOTE — ED PROVIDER NOTE - PLAN OF CARE
Patient underwent psychiatric evaluation and evaluation for abdominal pain.  Patient had findings of infected urine.  In light of instrumentation by urology patient will have urology consult and CAT scan

## 2023-04-13 ENCOUNTER — NON-APPOINTMENT (OUTPATIENT)
Age: 58
End: 2023-04-13

## 2023-04-17 ENCOUNTER — APPOINTMENT (OUTPATIENT)
Dept: UROLOGY | Facility: CLINIC | Age: 58
End: 2023-04-17
Payer: MEDICAID

## 2023-04-17 VITALS
WEIGHT: 202 LBS | HEART RATE: 82 BPM | RESPIRATION RATE: 17 BRPM | OXYGEN SATURATION: 98 % | HEIGHT: 71 IN | BODY MASS INDEX: 28.28 KG/M2 | DIASTOLIC BLOOD PRESSURE: 83 MMHG | TEMPERATURE: 98.5 F | SYSTOLIC BLOOD PRESSURE: 113 MMHG

## 2023-04-17 PROCEDURE — 99213 OFFICE O/P EST LOW 20 MIN: CPT

## 2023-04-17 NOTE — PHYSICAL EXAM
[General Appearance - Well Developed] : well developed [General Appearance - Well Nourished] : well nourished [Well Groomed] : well groomed [Normal Appearance] : normal appearance [Abdomen Soft] : soft [General Appearance - In No Acute Distress] : no acute distress [Abdomen Tenderness] : non-tender [Costovertebral Angle Tenderness] : no ~M costovertebral angle tenderness [Urethral Meatus] : meatus normal [Urinary Bladder Findings] : the bladder was normal on palpation [Scrotum] : the scrotum was normal [Testes Mass (___cm)] : there were no testicular masses [No Prostate Nodules] : no prostate nodules [Edema] : no peripheral edema [] : no respiratory distress [Respiration, Rhythm And Depth] : normal respiratory rhythm and effort [Exaggerated Use Of Accessory Muscles For Inspiration] : no accessory muscle use [Oriented To Time, Place, And Person] : oriented to person, place, and time [Affect] : the affect was normal [Mood] : the mood was normal [Not Anxious] : not anxious [Normal Station and Gait] : the gait and station were normal for the patient's age [No Focal Deficits] : no focal deficits [No Palpable Adenopathy] : no palpable adenopathy [FreeTextEntry1] : nephrostomies in place, clamped

## 2023-04-17 NOTE — HISTORY OF PRESENT ILLNESS
[FreeTextEntry1] : 56y/o male with recent bilateral hydronephrosis and nephrostomy positioning.\par Underwent bilateral stent placement with evidence of ureteral strictures.\par The patient comes to office after episode of excruciating abdominal pain, for which he referred to the ER.\par Now denies pain.

## 2023-04-17 NOTE — ASSESSMENT
[FreeTextEntry1] : 56y/o male with recent bilateral hydronephrosis and nephrostomy positioning.\par Underwent bilateral stent placement with evidence of ureteral strictures.\par The patient comes to office after episode of excruciating abdominal pain, for which he referred to the ER.\par Now denies pain. \par \par Creatinine was regular on hospital check.\par \par Performing US kidney, which reveals unvaried situation from the last check.\par \par Will perform stent replacement in 4 months.\par F/U in case of flank pain.

## 2023-04-26 ENCOUNTER — APPOINTMENT (OUTPATIENT)
Dept: COLORECTAL SURGERY | Facility: CLINIC | Age: 58
End: 2023-04-26
Payer: MEDICAID

## 2023-04-26 VITALS
BODY MASS INDEX: 28.28 KG/M2 | HEIGHT: 71 IN | HEART RATE: 92 BPM | WEIGHT: 202 LBS | SYSTOLIC BLOOD PRESSURE: 140 MMHG | DIASTOLIC BLOOD PRESSURE: 110 MMHG

## 2023-04-26 PROCEDURE — 99213 OFFICE O/P EST LOW 20 MIN: CPT

## 2023-04-26 NOTE — HISTORY OF PRESENT ILLNESS
[FreeTextEntry1] : Status post abdominal perineal resection for perforated anus/rectum after radiation therapy for prostate and anal cancer. Patient progressing well. Tolerating diet. Stoma functioning. Draining left thigh wound with decreased output\par \par 4/26/23-58 yo male s/p APR for anal cancer w/ radiation x2.  Pt with intermittent abdominal pain.  noted to have ureteral strictures requiring stents b/l secondary to radiation.  Pt recently presented to ER and was noted to have UTI.  Pt had CT which otherwise w/ wnl.  Reports constipation. Takes miralax daily.  no fevers or chills no n/v.  No aggravating factors

## 2023-04-26 NOTE — ASSESSMENT
[FreeTextEntry1] : Anal cancer s/p APR.  Ureteral stricture b/l likely secondary radiation\par -CT images and report reviewed.  No radiologic reason for abdominal pain\par -Miralax daily \par -Metamucil\par -f/u in 6 weeks if persistent pain

## 2023-04-28 NOTE — DATA REVIEWED
[FreeTextEntry1] : MRI Pelvis (11/2021): \par FINDINGS: No residual anal mass. There is focal T2 hypointense scarring at the region of anal mass, in the left anterior aspect (23:26).\par At the region of previous mass, there is focal thinning of the anal internal sphincter and a tubular fluid signal intensity, extending anteroinferiorly within the intersphincteric space, approximately 1.8 cm in length, without definite extension into perianal skin. Mild surrounding enhancement.\par \par No suspicious enlarged lymph nodes. No focal abnormality in the other visceral organs or osseous structures.\par \par IMPRESSION:\par No residual tumor, with focal scarring. No suspicious lymphadenopathy.\par Findings suspicious for active left anterior intersphincteric sinus tract, without definite skin opening. Clinical correlation is recommended.\par 
Unknown if ever smoked

## 2023-05-01 NOTE — H&P PST ADULT - PROBLEM SELECTOR PLAN 4
For Your Information   · If you are in need of a medication refill please use one of the following options. You can expect your medication to be filled within 2-3 business days.   1. Call your pharmacy for all medication refills and renewals.   2. [email or web address]- [email or web address]  3. Call your providers office    · If your provider ordered any imaging for you today. Our pre-scheduling services will be reaching out to you within 2 business days to schedule this. [facility] can be reached by calling [phone]     · If your provider ordered testing today, you will be notified of your test results within 3-5 business days unless specified otherwise. If you have not received your results within 5 business days please call your provider's office.    · You may be receiving a survey.  Please take the time to complete this, as your feedback is very important to us!  We strive to make your experience exceptional and your comments help us with that goal.  We look forward to hearing from you!    · For all future appointments please arrive 15 minutes prior to your scheduled visit.     · Patient Contact Center Business Office: assistance with medical billing & financial inquires [phone]          Medicare Wellness Visit  Plan for Preventive Care    A good way for you to stay healthy is to use preventive care.  Medicare covers many services that can help you stay healthy.* The goal of these services is to find any health problems as quickly as possible. Finding problems early can help make them easier to treat.  Your personal plan below lists the services you may need and when they are due.      Health Maintenance Summary     Diabetes Eye Exam (Yearly)  Overdue - never done    Diabetes Foot Exam (Yearly)  Overdue - never done    COVID-19 Vaccine (3 - Booster for Pfizer series)  Overdue since 9/7/2022    Medicare Advantage- Medicare Wellness Visit (Yearly - January to December)  Overdue - never  done    Shingles Vaccine (2 of 2)  Overdue since 5/1/2023    Diabetes A1C (Every 6 Months)  Next due on 9/7/2023    Lung Cancer Screening (Yearly)  Next due on 2/24/2024    DM/CKD GFR (Yearly)  Next due on 3/7/2024    DM/CKD Microalbumin (Yearly)  Next due on 3/10/2024    Colorectal Cancer Screen- (Cologuard - Every 3 Years)  Next due on 3/30/2026    DTaP/Tdap/Td Vaccine (2 - Td or Tdap)  Next due on 10/5/2027    Pneumococcal Vaccine 0-64 (3 - PPSV23 if available, else PCV20)  Next due on 12/27/2033    Hepatitis B Vaccine   Completed    Influenza Vaccine   Completed    Meningococcal Vaccine   Aged Out    HPV Vaccine   Aged Out           Preventive Care for Women and Men    Heart Screenings (Cardiovascular):  · Blood tests are used to check your cholesterol, lipid and triglyceride levels. High levels can increase your risk for heart disease and stroke. High levels can be treated with medications, diet and exercise. Lowering your levels can help keep your heart and blood vessels healthy.  Your provider will order these tests if they are needed.    · An ultrasound is done to see if you have an abdominal aortic aneurysm (AAA).  This is an enlargement of one of the main blood vessels that delivers blood to the body.   In the [place], 9,000 deaths are caused by AAA.  You may not even know you have this problem and as many as 1 in 3 people will have a serious problem if it is not treated.  Early diagnosis allows for more effective treatment and cure.  If you have a family history of AAA or are a male age 65-75 who has smoked, you are at higher risk of an AAA.  Your provider can order this test, if needed.    Colorectal Screening:  · There are many tests that are used to check for cancer of your colon and rectum. You and your provider should discuss what test is best for you and when to have it done.  Options include:  · Screening Colonoscopy: exam of the entire colon, seen through a flexible lighted tube.  · Flexible  Sigmoidoscopy: exam of the last third (sigmoid portion) of the colon and rectum, seen through a flexible lighted tube.  · Cologuard DNA stool test: a sample of your stool is used to screen for cancer and unseen blood in your stool.  · Fecal Occult Blood Test: a sample of your stool is studied to find any unseen blood    Flu Shot:  · An immunization that helps to prevent influenza (the flu). You should get this every year. The best time to get the shot is in the fall.    Pneumococcal Shot:  • Vaccines help prevent pneumococcal disease, which is any type of illness caused by Streptococcus pneumoniae bacteria. There are two kinds of pneumococcal vaccines available in the [place]:   o Pneumococcal conjugate vaccines (PCV20 or Tazsbbw63®)  o Pneumococcal polysaccharide vaccine (PPSV23 or Ijprrjrfu95®)  · For those who have never received any pneumococcal conjugate vaccine, CDC recommends PVC20 for adults 65 years or older and adults 19 through 64 years old with certain medical conditions or risk factors.   · For those who have previously received PCV13, this should be followed by a dose of PPSV23.     Hepatitis B Shot:  · An immunization that helps to protect people from getting Hepatitis B. Hepatitis B is a virus that spreads through contact with infected blood or body fluids. Many people with the virus do not have symptoms.  The virus can lead to serious problems, such as liver disease. Some people are at higher risk than others. Your doctor will tell you if you need this shot.     Diabetes Screening:  · A test to measure sugar (glucose) in your blood is called a fasting blood sugar. Fasting means you cannot have food or drink for at least 8 hours before the test. This test can detect diabetes long before you may notice symptoms.    Glaucoma Screening:  · Glaucoma screening is performed by your eye doctor. The test measures the fluid pressure inside your eyes to determine if you have glaucoma.     Hepatitis C  Screening:  · A blood test to see if you have the hepatitis C virus.  Hepatitis C attacks the liver and is a major cause of chronic liver disease.  Medicare will cover a single screening for all adults born between 1945 & 1965, or high risk patients (people who have injected illegal drugs or people who have had blood transfusions).  High risk patients who continue to inject illegal drugs can be screened for Hepatitis C every year.    Smoking and Tobacco-Use Cessation Counseling:  · Tobacco is the single greatest cause of disease and early death in our country today. Medication and counseling together can increase a person’s chance of quitting for good.   · Medicare covers two quitting attempts per year, with four counseling sessions per attempt (eight sessions in a 12 month period)    Preventive Screening tests for Women    Screening Mammograms and Breast Exams:  · An x-ray of your breasts to check for breast cancer before you or your doctor may be able to feel it.  If breast cancer is found early it can usually be treated with success.    Pelvic Exams and Pap Tests:  · An exam to check for cervical and vaginal cancer. A Pap test is a lab test in which cells are taken from your cervix and sent to the lab to look for signs of cervical cancer. If cancer of the cervix is found early, chances for a cure are good. Testing can generally end at age 65, or if a woman has a hysterectomy for a benign condition. Your provider may recommend more frequent testing if certain abnormal results are found.    Bone Mass Measurements:  · A painless x-ray of your bone density to see if you are at risk for a broken bone. Bone density refers to the thickness of bones or how tightly the bone tissue is packed.    Preventive Screening tests for Men    Prostate Screening:  · Should you have a prostate cancer test (PSA)?  It is up to you to decide if you want a prostate cancer test. Talk to your clinician to find out if the test is right for  you.  Things for you to consider and talk about should include:  · Benefits and harms of the test  · Your family history  · How your race/ethnicity may influence the test  · If the test may impact other medical conditions you have  · Your values on screenings and treatments    *Medicare pays for many preventive services to keep you healthy. For some of these services, you might have to pay a deductible, coinsurance, and / or copayment.  The amounts vary depending on the type of services you need and the kind of Medicare health plan you have.    For further details on screenings offered by Medicare please visit: https://www.medicare.gov/coverage/preventive-screening-services    s/p RT ~8years ago  medical clearance

## 2023-05-11 ENCOUNTER — LABORATORY RESULT (OUTPATIENT)
Age: 58
End: 2023-05-11

## 2023-05-11 ENCOUNTER — OUTPATIENT (OUTPATIENT)
Dept: OUTPATIENT SERVICES | Facility: HOSPITAL | Age: 58
LOS: 1 days | End: 2023-05-11
Payer: MEDICAID

## 2023-05-11 ENCOUNTER — APPOINTMENT (OUTPATIENT)
Dept: INFECTIOUS DISEASE | Facility: CLINIC | Age: 58
End: 2023-05-11
Payer: MEDICAID

## 2023-05-11 VITALS
BODY MASS INDEX: 28 KG/M2 | OXYGEN SATURATION: 98 % | SYSTOLIC BLOOD PRESSURE: 145 MMHG | WEIGHT: 200 LBS | HEIGHT: 71 IN | DIASTOLIC BLOOD PRESSURE: 114 MMHG | TEMPERATURE: 97.7 F | HEART RATE: 86 BPM

## 2023-05-11 DIAGNOSIS — B20 HUMAN IMMUNODEFICIENCY VIRUS [HIV] DISEASE: ICD-10-CM

## 2023-05-11 DIAGNOSIS — Z93.3 COLOSTOMY STATUS: Chronic | ICD-10-CM

## 2023-05-11 LAB
4/8 RATIO: 0.32 RATIO — LOW (ref 0.9–3.6)
ABS CD8: 1560 CELLS/UL — HIGH (ref 142–740)
ALBUMIN SERPL ELPH-MCNC: 4.4 G/DL — SIGNIFICANT CHANGE UP (ref 3.3–5)
ALP SERPL-CCNC: 115 U/L — SIGNIFICANT CHANGE UP (ref 40–120)
ALT FLD-CCNC: 8 U/L — LOW (ref 10–45)
ANION GAP SERPL CALC-SCNC: 14 MMOL/L — SIGNIFICANT CHANGE UP (ref 5–17)
AST SERPL-CCNC: 14 U/L — SIGNIFICANT CHANGE UP (ref 10–40)
BASOPHILS # BLD AUTO: 0.04 K/UL — SIGNIFICANT CHANGE UP (ref 0–0.2)
BASOPHILS NFR BLD AUTO: 0.6 % — SIGNIFICANT CHANGE UP (ref 0–2)
BILIRUB SERPL-MCNC: 0.2 MG/DL — SIGNIFICANT CHANGE UP (ref 0.2–1.2)
BUN SERPL-MCNC: 14 MG/DL — SIGNIFICANT CHANGE UP (ref 7–23)
CALCIUM SERPL-MCNC: 10.2 MG/DL — SIGNIFICANT CHANGE UP (ref 8.4–10.5)
CD3 BLASTS SPEC-ACNC: 2061 CELLS/UL — HIGH (ref 672–1870)
CD3 BLASTS SPEC-ACNC: 87 % — HIGH (ref 59–83)
CD4 %: 21 % — LOW (ref 30–62)
CD8 %: 66 % — HIGH (ref 12–36)
CHLORIDE SERPL-SCNC: 105 MMOL/L — SIGNIFICANT CHANGE UP (ref 96–108)
CO2 SERPL-SCNC: 27 MMOL/L — SIGNIFICANT CHANGE UP (ref 22–31)
CREAT SERPL-MCNC: 0.95 MG/DL — SIGNIFICANT CHANGE UP (ref 0.5–1.3)
EGFR: 94 ML/MIN/1.73M2 — SIGNIFICANT CHANGE UP
EOSINOPHIL # BLD AUTO: 0.05 K/UL — SIGNIFICANT CHANGE UP (ref 0–0.5)
EOSINOPHIL NFR BLD AUTO: 0.8 % — SIGNIFICANT CHANGE UP (ref 0–6)
GLUCOSE SERPL-MCNC: 121 MG/DL — HIGH (ref 70–99)
HCT VFR BLD CALC: 42.3 % — SIGNIFICANT CHANGE UP (ref 39–50)
HGB BLD-MCNC: 13.7 G/DL — SIGNIFICANT CHANGE UP (ref 13–17)
IMM GRANULOCYTES NFR BLD AUTO: 0.2 % — SIGNIFICANT CHANGE UP (ref 0–0.9)
LYMPHOCYTES # BLD AUTO: 2.38 K/UL — SIGNIFICANT CHANGE UP (ref 1–3.3)
LYMPHOCYTES # BLD AUTO: 36.5 % — SIGNIFICANT CHANGE UP (ref 13–44)
MCHC RBC-ENTMCNC: 29.1 PG — SIGNIFICANT CHANGE UP (ref 27–34)
MCHC RBC-ENTMCNC: 32.4 GM/DL — SIGNIFICANT CHANGE UP (ref 32–36)
MCV RBC AUTO: 90 FL — SIGNIFICANT CHANGE UP (ref 80–100)
MONOCYTES # BLD AUTO: 0.88 K/UL — SIGNIFICANT CHANGE UP (ref 0–0.9)
MONOCYTES NFR BLD AUTO: 13.5 % — SIGNIFICANT CHANGE UP (ref 2–14)
NEUTROPHILS # BLD AUTO: 3.16 K/UL — SIGNIFICANT CHANGE UP (ref 1.8–7.4)
NEUTROPHILS NFR BLD AUTO: 48.4 % — SIGNIFICANT CHANGE UP (ref 43–77)
PLATELET # BLD AUTO: 281 K/UL — SIGNIFICANT CHANGE UP (ref 150–400)
POTASSIUM SERPL-MCNC: 4.5 MMOL/L — SIGNIFICANT CHANGE UP (ref 3.5–5.3)
POTASSIUM SERPL-SCNC: 4.5 MMOL/L — SIGNIFICANT CHANGE UP (ref 3.5–5.3)
PROT SERPL-MCNC: 7.3 G/DL — SIGNIFICANT CHANGE UP (ref 6–8.3)
RBC # BLD: 4.7 M/UL — SIGNIFICANT CHANGE UP (ref 4.2–5.8)
RBC # FLD: 13.5 % — SIGNIFICANT CHANGE UP (ref 10.3–14.5)
SODIUM SERPL-SCNC: 145 MMOL/L — SIGNIFICANT CHANGE UP (ref 135–145)
T-CELL CD4 SUBSET PNL BLD: 499 CELLS/UL — SIGNIFICANT CHANGE UP (ref 489–1457)
WBC # BLD: 6.52 K/UL — SIGNIFICANT CHANGE UP (ref 3.8–10.5)
WBC # FLD AUTO: 6.52 K/UL — SIGNIFICANT CHANGE UP (ref 3.8–10.5)

## 2023-05-11 PROCEDURE — 36415 COLL VENOUS BLD VENIPUNCTURE: CPT

## 2023-05-11 PROCEDURE — 86360 T CELL ABSOLUTE COUNT/RATIO: CPT

## 2023-05-11 PROCEDURE — 87536 HIV-1 QUANT&REVRSE TRNSCRPJ: CPT

## 2023-05-11 PROCEDURE — 80053 COMPREHEN METABOLIC PANEL: CPT

## 2023-05-11 PROCEDURE — 86359 T CELLS TOTAL COUNT: CPT

## 2023-05-11 PROCEDURE — G0009: CPT

## 2023-05-11 PROCEDURE — 90732 PPSV23 VACC 2 YRS+ SUBQ/IM: CPT

## 2023-05-11 PROCEDURE — 85025 COMPLETE CBC W/AUTO DIFF WBC: CPT

## 2023-05-11 PROCEDURE — 99214 OFFICE O/P EST MOD 30 MIN: CPT

## 2023-05-11 PROCEDURE — G0463: CPT | Mod: 25

## 2023-05-11 NOTE — HISTORY OF PRESENT ILLNESS
[FreeTextEntry1] : 57 m with HTN, HIV diagnosed 1992 on Biktarvy,  prostate ca 2016 s/p radiation, anal cancer (dx 4/2021) s/p radiation and chemotherapy (last 8/2021) complicated by an ulcer, abscess and rectal bleeding\par s/p loop colostomy 3/30/22 and then s/p APR with end colostomy creation, VRAM flap closure, 8/24 found to have a large abscess as well, abscess cx with clostridium, bacteroides \par again was admitted for GIB and pelvic/groin abscess s/p drain placement and CT also showed L ischial tuberosity osteo so pt received zosyn in the hospital and was discharged with PO levo and flagyl to finish a 6 week course\par biktarvy was held during the hospitalization for some time in view of OR and GIB but now compliant \par he was admitted 11/2022 with hematuria and hydro, urine cx and AFB were negative, s/p b/l nephrostomy, and then had laser lithotripsy and stent placement 1/25/23, the nephrostomies were clamped and removed now with stents, just has intermittent abd pain and occasional dysuria, no fever, chills, compliant with neds\par \par \par \par \par

## 2023-05-11 NOTE — PHYSICAL EXAM
[General Appearance - Alert] : alert [General Appearance - In No Acute Distress] : in no acute distress [Sclera] : the sclera and conjunctiva were normal [Outer Ear] : the ears and nose were normal in appearance [Neck Appearance] : the appearance of the neck was normal [Auscultation Breath Sounds / Voice Sounds] : lungs were clear to auscultation bilaterally [Heart Sounds] : normal S1 and S2 [Edema] : there was no peripheral edema [Bowel Sounds] : normal bowel sounds [Abdomen Soft] : soft [Costovertebral Angle Tenderness] : no CVA tenderness [FreeTextEntry1] : ostomy  [No Palpable Adenopathy] : no palpable adenopathy [Musculoskeletal - Swelling] : no joint swelling [] : no rash [No Focal Deficits] : no focal deficits [Affect] : the affect was normal

## 2023-05-11 NOTE — ASSESSMENT
[FreeTextEntry1] : 57 m with HTN, HIV diagnosed 1992 on Biktarvy,  prostate ca 2016 s/p radiation, anal cancer (dx 4/2021) s/p radiation and chemotherapy (last 8/2021) complicated by an ulcer, abscess and rectal bleeding\par s/p loop colostomy 3/30/22 and then s/p APR with end colostomy creation, VRAM flap closure, 8/24 found to have a large abscess as well, abscess cx with clostridium, bacteroides \par again was admitted for GIB and pelvic/groin abscess s/p drain placement and CT also showed L ischial tuberosity osteo so pt received zosyn in the hospital and was discharged with PO levo and flagyl to finish a 6 week course\par biktarvy was held during the hospitalization for some time in view of OR and GIB but now compliant \par he was admitted 11/2022 with hematuria and hydro, urine cx and AFB were negative, s/p b/l nephrostomy, and then had laser lithotripsy and stent placement 1/25/23, the nephrostomies were clamped and removed now with b/l stents, has abd pain and occasional dysuria\par \par \par \par \par \par \par HIV on Biktarvy, VL:UD, CD4 477, ration 0.31\par c/w biktarvy and repeat VL, CD4\par \par anal Ca s/p chemo and RT with rectal ulcer, abscess and bleed s/p loop colostomy 3/30/22 and then s/p APR with end colostomy creation, VRAM flap closure, 8/24 found to have a large abscess as well, abscess cx with clostridium, bacteroides \par again was admitted for GIB and pelvic/groin abscess s/p drain placement and CT also showed L ischial tuberosity osteo so pt received zosyn in the hospital and was discharged with PO levo and flagyl to finish a 6 week course\par \par hematuria, hydro, nephrolithiasis \par admitted 11/2022 with hematuria and hydro, urine cx and AFB were negative, s/p b/l nephrostomy, and then had laser lithotripsy and stent placement 1/25/23, the nephrostomies were clamped and removed now with b/l stents\par has occasional dysuria and was treated for UTI outside\par f/u with urology\par \par HTN, follows with medicine and medications were just adjusted but still has elevated BP, will follow with medicine\par \par HCM: \par will give pneumovax\par s/p flu and COVID vaccine\par s/p prevnar 2022\par f/u with dental and ophthalmology\par

## 2023-05-11 NOTE — REVIEW OF SYSTEMS
[Fever] : no fever [Chills] : no chills [Eye Pain] : no eye pain [Red Eyes] : eyes not red [Earache] : no earache [Loss Of Hearing] : no hearing loss [Chest Pain] : no chest pain [Palpitations] : no palpitations [Shortness Of Breath] : no shortness of breath [Wheezing] : no wheezing [Abdominal Pain] : abdominal pain [Vomiting] : no vomiting [Dysuria] : dysuria [Joint Swelling] : no joint swelling [Skin Lesions] : no skin lesions [Confused] : no confusion [Convulsions] : no convulsions [Suicidal] : not suicidal [Easy Bleeding] : no tendency for easy bleeding [Easy Bruising] : no tendency for easy bruising [Negative] : Heme/Lymph [FreeTextEntry8] : occasional dysuria

## 2023-05-12 NOTE — PROGRESS NOTE ADULT - PROBLEM SELECTOR PLAN 8
DVT ppx: SCDs, pharm ppx on hold given hematuria on admission  DIET: DASH  DISPO: Home Gabapentin Counseling: I discussed with the patient the risks of gabapentin including but not limited to dizziness, somnolence, fatigue and ataxia.

## 2023-05-15 ENCOUNTER — APPOINTMENT (OUTPATIENT)
Dept: UROLOGY | Facility: CLINIC | Age: 58
End: 2023-05-15

## 2023-05-15 VITALS
TEMPERATURE: 98.5 F | SYSTOLIC BLOOD PRESSURE: 144 MMHG | DIASTOLIC BLOOD PRESSURE: 104 MMHG | OXYGEN SATURATION: 100 % | WEIGHT: 200 LBS | RESPIRATION RATE: 17 BRPM | HEART RATE: 92 BPM | HEIGHT: 71 IN | BODY MASS INDEX: 28 KG/M2

## 2023-05-17 DIAGNOSIS — K65.1 PERITONEAL ABSCESS: ICD-10-CM

## 2023-05-17 DIAGNOSIS — C21.0 MALIGNANT NEOPLASM OF ANUS, UNSPECIFIED: ICD-10-CM

## 2023-05-17 DIAGNOSIS — Z23 ENCOUNTER FOR IMMUNIZATION: ICD-10-CM

## 2023-05-18 ENCOUNTER — APPOINTMENT (OUTPATIENT)
Dept: CARDIOLOGY | Facility: CLINIC | Age: 58
End: 2023-05-18
Payer: MEDICAID

## 2023-05-18 VITALS
HEART RATE: 92 BPM | BODY MASS INDEX: 28 KG/M2 | DIASTOLIC BLOOD PRESSURE: 89 MMHG | TEMPERATURE: 97.8 F | SYSTOLIC BLOOD PRESSURE: 147 MMHG | WEIGHT: 200 LBS | OXYGEN SATURATION: 99 % | HEIGHT: 71 IN

## 2023-05-18 PROCEDURE — 99214 OFFICE O/P EST MOD 30 MIN: CPT

## 2023-05-18 NOTE — HISTORY OF PRESENT ILLNESS
[FreeTextEntry1] : CARL ROWLEY 57 year M HIV positive, anal ca, prostate ca treated with XRT and chemo though no anthracyclines. No prior cardiac hx. No DM but prior longstanding smoker now abated.  Mild chronic dyspnea, (NYHA class 2-3) likely pulmonary origin;  12/22/22  EKG NSR 96BPM SHYANN RVH. Wheezing has markedly diminished with tobacco cessation.  Echo no RVH SHYANN. LVEF  >60%.  Total visit time 35min. Ostomy site discomfort and flank pain, prior UTIs, requested stopping statin for possible myalgia. Zetia substituted. Meds ASA, zetia 10/d, metoprolol 100/d, cardizem 240/d. RTC 3m 147/89 HR 92/min improved. RTC 3m\par

## 2023-05-19 ENCOUNTER — APPOINTMENT (OUTPATIENT)
Dept: INTERNAL MEDICINE | Facility: CLINIC | Age: 58
End: 2023-05-19
Payer: MEDICAID

## 2023-05-19 VITALS
OXYGEN SATURATION: 96 % | TEMPERATURE: 97.6 F | HEIGHT: 71 IN | SYSTOLIC BLOOD PRESSURE: 147 MMHG | HEART RATE: 89 BPM | DIASTOLIC BLOOD PRESSURE: 120 MMHG | WEIGHT: 200 LBS | BODY MASS INDEX: 28 KG/M2

## 2023-05-19 PROCEDURE — 99214 OFFICE O/P EST MOD 30 MIN: CPT

## 2023-05-19 NOTE — PHYSICAL EXAM
[No Acute Distress] : no acute distress [Well Nourished] : well nourished [Well Developed] : well developed [Well-Appearing] : well-appearing [Normal Sclera/Conjunctiva] : normal sclera/conjunctiva [PERRL] : pupils equal round and reactive to light [EOMI] : extraocular movements intact [Normal Outer Ear/Nose] : the outer ears and nose were normal in appearance [Normal Oropharynx] : the oropharynx was normal [No JVD] : no jugular venous distention [No Lymphadenopathy] : no lymphadenopathy [Supple] : supple [Thyroid Normal, No Nodules] : the thyroid was normal and there were no nodules present [No Respiratory Distress] : no respiratory distress  [No Accessory Muscle Use] : no accessory muscle use [Clear to Auscultation] : lungs were clear to auscultation bilaterally [Normal Rate] : normal rate  [Regular Rhythm] : with a regular rhythm [Normal S1, S2] : normal S1 and S2 [No Murmur] : no murmur heard [No Carotid Bruits] : no carotid bruits [No Abdominal Bruit] : a ~M bruit was not heard ~T in the abdomen [No Varicosities] : no varicosities [Pedal Pulses Present] : the pedal pulses are present [No Edema] : there was no peripheral edema [No Palpable Aorta] : no palpable aorta [No Extremity Clubbing/Cyanosis] : no extremity clubbing/cyanosis [Soft] : abdomen soft [Non Tender] : non-tender [Non-distended] : non-distended [No Masses] : no abdominal mass palpated [No HSM] : no HSM [Normal Bowel Sounds] : normal bowel sounds [No CVA Tenderness] : no CVA  tenderness [No Spinal Tenderness] : no spinal tenderness [No Joint Swelling] : no joint swelling [Grossly Normal Strength/Tone] : grossly normal strength/tone [No Rash] : no rash [Coordination Grossly Intact] : coordination grossly intact [No Focal Deficits] : no focal deficits [Normal Gait] : normal gait [Deep Tendon Reflexes (DTR)] : deep tendon reflexes were 2+ and symmetric [Normal Affect] : the affect was normal [Normal Insight/Judgement] : insight and judgment were intact [de-identified] : colostomy bag in place- no sign of infection

## 2023-05-19 NOTE — ASSESSMENT
[FreeTextEntry1] : abdominal pain- likey constipation\par has tried all the otc\par will try lactulose\par GI referal\par increase fiber in diet\par 3 day supply of perccet ordered\par

## 2023-05-19 NOTE — HISTORY OF PRESENT ILLNESS
[FreeTextEntry8] : Stomach pains on and off for about a year \par last week has been bad\par has a colostomy bag\par that is where the pain is\par \par hx of anal cancer and now the bowel is redirected \par + constipation\par diet low in fiber\par \par

## 2023-05-29 ENCOUNTER — INPATIENT (INPATIENT)
Facility: HOSPITAL | Age: 58
LOS: 2 days | Discharge: HOME | End: 2023-06-01
Attending: STUDENT IN AN ORGANIZED HEALTH CARE EDUCATION/TRAINING PROGRAM | Admitting: STUDENT IN AN ORGANIZED HEALTH CARE EDUCATION/TRAINING PROGRAM
Payer: MEDICAID

## 2023-05-29 VITALS
HEART RATE: 109 BPM | DIASTOLIC BLOOD PRESSURE: 92 MMHG | HEIGHT: 71 IN | RESPIRATION RATE: 10 BRPM | SYSTOLIC BLOOD PRESSURE: 120 MMHG | OXYGEN SATURATION: 100 % | TEMPERATURE: 98 F

## 2023-05-29 DIAGNOSIS — R10.9 UNSPECIFIED ABDOMINAL PAIN: ICD-10-CM

## 2023-05-29 DIAGNOSIS — Z93.3 COLOSTOMY STATUS: Chronic | ICD-10-CM

## 2023-05-29 DIAGNOSIS — K62.9 DISEASE OF ANUS AND RECTUM, UNSPECIFIED: Chronic | ICD-10-CM

## 2023-05-29 LAB
ALBUMIN SERPL ELPH-MCNC: 4 G/DL — SIGNIFICANT CHANGE UP (ref 3.3–5)
ALP SERPL-CCNC: 96 U/L — SIGNIFICANT CHANGE UP (ref 40–120)
ALT FLD-CCNC: 12 U/L — SIGNIFICANT CHANGE UP (ref 4–41)
ANION GAP SERPL CALC-SCNC: 14 MMOL/L — SIGNIFICANT CHANGE UP (ref 7–14)
APPEARANCE UR: ABNORMAL
AST SERPL-CCNC: 16 U/L — SIGNIFICANT CHANGE UP (ref 4–40)
BACTERIA # UR AUTO: ABNORMAL
BASOPHILS # BLD AUTO: 0.04 K/UL — SIGNIFICANT CHANGE UP (ref 0–0.2)
BASOPHILS NFR BLD AUTO: 0.6 % — SIGNIFICANT CHANGE UP (ref 0–2)
BILIRUB SERPL-MCNC: <0.2 MG/DL — SIGNIFICANT CHANGE UP (ref 0.2–1.2)
BILIRUB UR-MCNC: NEGATIVE — SIGNIFICANT CHANGE UP
BUN SERPL-MCNC: 18 MG/DL — SIGNIFICANT CHANGE UP (ref 7–23)
CALCIUM SERPL-MCNC: 9.3 MG/DL — SIGNIFICANT CHANGE UP (ref 8.4–10.5)
CHLORIDE SERPL-SCNC: 105 MMOL/L — SIGNIFICANT CHANGE UP (ref 98–107)
CO2 SERPL-SCNC: 23 MMOL/L — SIGNIFICANT CHANGE UP (ref 22–31)
COD CRY URNS QL: ABNORMAL
COLOR SPEC: ABNORMAL
CREAT SERPL-MCNC: 0.98 MG/DL — SIGNIFICANT CHANGE UP (ref 0.5–1.3)
DIFF PNL FLD: ABNORMAL
EGFR: 90 ML/MIN/1.73M2 — SIGNIFICANT CHANGE UP
EOSINOPHIL # BLD AUTO: 0.06 K/UL — SIGNIFICANT CHANGE UP (ref 0–0.5)
EOSINOPHIL NFR BLD AUTO: 0.9 % — SIGNIFICANT CHANGE UP (ref 0–6)
EPI CELLS # UR: SIGNIFICANT CHANGE UP
GLUCOSE SERPL-MCNC: 130 MG/DL — HIGH (ref 70–99)
GLUCOSE UR QL: ABNORMAL
HCT VFR BLD CALC: 39 % — SIGNIFICANT CHANGE UP (ref 39–50)
HGB BLD-MCNC: 12.7 G/DL — LOW (ref 13–17)
IANC: 4.29 K/UL — SIGNIFICANT CHANGE UP (ref 1.8–7.4)
IMM GRANULOCYTES NFR BLD AUTO: 0.4 % — SIGNIFICANT CHANGE UP (ref 0–0.9)
KETONES UR-MCNC: NEGATIVE — SIGNIFICANT CHANGE UP
LEUKOCYTE ESTERASE UR-ACNC: ABNORMAL
LYMPHOCYTES # BLD AUTO: 1.76 K/UL — SIGNIFICANT CHANGE UP (ref 1–3.3)
LYMPHOCYTES # BLD AUTO: 25.7 % — SIGNIFICANT CHANGE UP (ref 13–44)
MCHC RBC-ENTMCNC: 28.9 PG — SIGNIFICANT CHANGE UP (ref 27–34)
MCHC RBC-ENTMCNC: 32.6 GM/DL — SIGNIFICANT CHANGE UP (ref 32–36)
MCV RBC AUTO: 88.6 FL — SIGNIFICANT CHANGE UP (ref 80–100)
MONOCYTES # BLD AUTO: 0.67 K/UL — SIGNIFICANT CHANGE UP (ref 0–0.9)
MONOCYTES NFR BLD AUTO: 9.8 % — SIGNIFICANT CHANGE UP (ref 2–14)
NEUTROPHILS # BLD AUTO: 4.29 K/UL — SIGNIFICANT CHANGE UP (ref 1.8–7.4)
NEUTROPHILS NFR BLD AUTO: 62.6 % — SIGNIFICANT CHANGE UP (ref 43–77)
NITRITE UR-MCNC: NEGATIVE — SIGNIFICANT CHANGE UP
NRBC # BLD: 0 /100 WBCS — SIGNIFICANT CHANGE UP (ref 0–0)
NRBC # FLD: 0 K/UL — SIGNIFICANT CHANGE UP (ref 0–0)
PH UR: 6 — SIGNIFICANT CHANGE UP (ref 5–8)
PLATELET # BLD AUTO: 270 K/UL — SIGNIFICANT CHANGE UP (ref 150–400)
POTASSIUM SERPL-MCNC: 3.8 MMOL/L — SIGNIFICANT CHANGE UP (ref 3.5–5.3)
POTASSIUM SERPL-SCNC: 3.8 MMOL/L — SIGNIFICANT CHANGE UP (ref 3.5–5.3)
PROT SERPL-MCNC: 7.1 G/DL — SIGNIFICANT CHANGE UP (ref 6–8.3)
PROT UR-MCNC: ABNORMAL
RBC # BLD: 4.4 M/UL — SIGNIFICANT CHANGE UP (ref 4.2–5.8)
RBC # FLD: 13.3 % — SIGNIFICANT CHANGE UP (ref 10.3–14.5)
RBC CASTS # UR COMP ASSIST: SIGNIFICANT CHANGE UP /HPF (ref 0–4)
SODIUM SERPL-SCNC: 142 MMOL/L — SIGNIFICANT CHANGE UP (ref 135–145)
SP GR SPEC: 1.02 — SIGNIFICANT CHANGE UP (ref 1.01–1.05)
UROBILINOGEN FLD QL: SIGNIFICANT CHANGE UP
WBC # BLD: 6.85 K/UL — SIGNIFICANT CHANGE UP (ref 3.8–10.5)
WBC # FLD AUTO: 6.85 K/UL — SIGNIFICANT CHANGE UP (ref 3.8–10.5)
WBC UR QL: 25 /HPF — HIGH (ref 0–5)

## 2023-05-29 PROCEDURE — 99223 1ST HOSP IP/OBS HIGH 75: CPT

## 2023-05-29 PROCEDURE — 76870 US EXAM SCROTUM: CPT | Mod: 26

## 2023-05-29 PROCEDURE — 99221 1ST HOSP IP/OBS SF/LOW 40: CPT

## 2023-05-29 PROCEDURE — 74177 CT ABD & PELVIS W/CONTRAST: CPT | Mod: 26,MA

## 2023-05-29 PROCEDURE — 99285 EMERGENCY DEPT VISIT HI MDM: CPT

## 2023-05-29 RX ORDER — ACETAMINOPHEN 500 MG
650 TABLET ORAL EVERY 6 HOURS
Refills: 0 | Status: DISCONTINUED | OUTPATIENT
Start: 2023-05-29 | End: 2023-06-01

## 2023-05-29 RX ORDER — CEFTRIAXONE 500 MG/1
2000 INJECTION, POWDER, FOR SOLUTION INTRAMUSCULAR; INTRAVENOUS EVERY 24 HOURS
Refills: 0 | Status: DISCONTINUED | OUTPATIENT
Start: 2023-05-29 | End: 2023-05-29

## 2023-05-29 RX ORDER — VANCOMYCIN HCL 1 G
1000 VIAL (EA) INTRAVENOUS EVERY 12 HOURS
Refills: 0 | Status: DISCONTINUED | OUTPATIENT
Start: 2023-05-29 | End: 2023-05-30

## 2023-05-29 RX ORDER — MORPHINE SULFATE 50 MG/1
4 CAPSULE, EXTENDED RELEASE ORAL ONCE
Refills: 0 | Status: DISCONTINUED | OUTPATIENT
Start: 2023-05-29 | End: 2023-05-29

## 2023-05-29 RX ORDER — ACETAMINOPHEN 500 MG
1000 TABLET ORAL ONCE
Refills: 0 | Status: COMPLETED | OUTPATIENT
Start: 2023-05-29 | End: 2023-05-29

## 2023-05-29 RX ORDER — CEFTRIAXONE 500 MG/1
1000 INJECTION, POWDER, FOR SOLUTION INTRAMUSCULAR; INTRAVENOUS ONCE
Refills: 0 | Status: COMPLETED | OUTPATIENT
Start: 2023-05-29 | End: 2023-05-29

## 2023-05-29 RX ORDER — CEFTRIAXONE 500 MG/1
1000 INJECTION, POWDER, FOR SOLUTION INTRAMUSCULAR; INTRAVENOUS EVERY 24 HOURS
Refills: 0 | Status: DISCONTINUED | OUTPATIENT
Start: 2023-05-30 | End: 2023-06-01

## 2023-05-29 RX ORDER — LANOLIN ALCOHOL/MO/W.PET/CERES
3 CREAM (GRAM) TOPICAL AT BEDTIME
Refills: 0 | Status: DISCONTINUED | OUTPATIENT
Start: 2023-05-29 | End: 2023-06-01

## 2023-05-29 RX ADMIN — MORPHINE SULFATE 4 MILLIGRAM(S): 50 CAPSULE, EXTENDED RELEASE ORAL at 15:50

## 2023-05-29 RX ADMIN — Medication 400 MILLIGRAM(S): at 09:47

## 2023-05-29 RX ADMIN — MORPHINE SULFATE 4 MILLIGRAM(S): 50 CAPSULE, EXTENDED RELEASE ORAL at 09:48

## 2023-05-29 RX ADMIN — CEFTRIAXONE 100 MILLIGRAM(S): 500 INJECTION, POWDER, FOR SOLUTION INTRAMUSCULAR; INTRAVENOUS at 12:00

## 2023-05-29 NOTE — ED PROVIDER NOTE - CLINICAL SUMMARY MEDICAL DECISION MAKING FREE TEXT BOX
Attending MD Arenas.  Pt chronically ill appearing with discomfort on arrival to ED.  No distention/exquisite TTP, suspect chronic vs. acute on chronic process.  With 1 wk L testicular pain without swelling/change in lie and with complicated abdominal/pelvic hx, planned CT to begin.  Will defer on sono at present. Attending MD Arenas.  Pt chronically ill appearing with discomfort on arrival to ED.  No distention/exquisite TTP, suspect chronic vs. acute on chronic process.  With 1 wk L testicular pain without swelling/change in lie and with complicated abdominal/pelvic hx, planned CT to begin.  Will defer on sono at present..

## 2023-05-29 NOTE — ED PROVIDER NOTE - PROGRESS NOTE DETAILS
Kathrine Flannery PGY1: CT shows severe hydronephrosis on right side and moderate on left. Urology consulted and evaluated pt. Urology recommends admission for IR consultation for possible b/l nephrostomy tube placement. They also recommend getting a PVR to assess for retention due to ongoing hematuria. Pt TBA to medicine.

## 2023-05-29 NOTE — ED ADULT NURSE NOTE - OBJECTIVE STATEMENT
Receive pt. in Intake room 5 alert and oriented x 4, presenting to the ER with complaints of lower abdominal pain and testicular pain. Pt. stated " for the past week my lower abdomen, and has been hurting, and I have been constipated for more than a week". Medicated as ordered, labs sent. Right lower abdomen with colostomy in place draining scant amount of loose brown stool. Call bell place within reach.

## 2023-05-29 NOTE — CONSULT NOTE ADULT - SUBJECTIVE AND OBJECTIVE BOX
HPI    58 y/o male with a medical history of HIV, DMII, prostate cancer s/p radiation ~7 years ago, currently on Lupron, rectal ca s/p colostomy, lower GIB, now s/p APR with end colostomy creation. 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 Patient had seen Dr. Saldaña for stent placement in January and subsequent NT removal in February.     Today patient presents with bilateral flank pain and left testicular pain. He also reports urinary urgency and dysuria.     PAST MEDICAL & SURGICAL HISTORY:  HTN (hypertension)      HLD (hyperlipidemia)      HIV infection      Depressive disorder      Anxiety      Prostate cancer      Anal cancer      Diverticulosis      Lung cancer      Anal lesion      S/P colostomy          MEDICATIONS  (STANDING):    MEDICATIONS  (PRN):      FAMILY HISTORY:  FH: prostate cancer    FH: breast cancer        Allergies    No Known Allergies    Intolerances        SOCIAL HISTORY:    REVIEW OF SYSTEMS:   Otherwise negative as stated in HPI    Physical Exam  Vital signs  T(C): 36.6 (23 @ 15:49), Max: 36.6 (23 @ 08:48)  HR: 91 (23 @ 15:49)  BP: 114/83 (23 @ 15:49)  SpO2: 100% (23 @ 15:49)  Wt(kg): --    Output      Gen:  NAD    Pulm:  No respiratory distress  	  CV:  RRR    GI:  S/ND/NT    :  deferred patient in hallway    :      LABS:       @ 10:10    WBC 6.85  / Hct 39.0  / SCr 0.98         142  |  105  |  18  ----------------------------<  130<H>  3.8   |  23  |  0.98    Ca    9.3      29 May 2023 10:10    TPro  7.1  /  Alb  4.0  /  TBili  <0.2  /  DBili  x   /  AST  16  /  ALT  12  /  AlkPhos  96        Urinalysis Basic - ( 29 May 2023 10:10 )    Color: Light Red / Appearance: Turbid / S.021 / pH: x  Gluc: x / Ketone: Negative  / Bili: Negative / Urobili: <2 mg/dL   Blood: x / Protein: 300 mg/dL / Nitrite: Negative   Leuk Esterase: Large / RBC: MANY /HPF / WBC 25 /HPF   Sq Epi: x / Non Sq Epi: x / Bacteria: Few        Urine Cx:  Blood Cx:    RADIOLOGY:

## 2023-05-29 NOTE — ED PROVIDER NOTE - ATTENDING CONTRIBUTION TO CARE
Attending MD Arenas:  I performed a history and physical exam of the patient and discussed their management with the resident. I reviewed the resident's note and agree with the documented findings and plan of care. My medical decision making and observations are found above.

## 2023-05-29 NOTE — ED PROVIDER NOTE - PHYSICAL EXAMINATION
Abdomen soft, non-distended, surgical scars clean, intact, TTP over RUQ and LLQ    Pt abdomen soft, non-distended.  Ostomy in place and clean.  No blood in bag.  Scant soft brown stool.  Site intact without surrounding erythema.  LLQ and RUQ TTP on exam.  No inguinal TTP.  Penis uncircumsized, foreskin easily reducible.  Meatus non-erythematous.  PT has sensation of genitals (had endorsed numbness), BL testicles normal lie, no edema, non-tender spermatocord.  L testicle proper mildly TTP on manipulation.  No palpable hernias.

## 2023-05-29 NOTE — ED PROVIDER NOTE - CPE EDP HEME LYMPH NORM
Has diabetic ulcer on L foot x3 months which had been heeling, x3 days pt states wound has been more painful, erythematous, and is draining blood. Denies any fevers/ chills/ other complaints.
normal...

## 2023-05-29 NOTE — CONSULT NOTE ADULT - ATTENDING COMMENTS
JJ stent in place - increased hydronephrosis  no KATY / leukocytosis  patient due for stent exchange - will attempt to coordinate during this admission

## 2023-05-29 NOTE — ED ADULT TRIAGE NOTE - CHIEF COMPLAINT QUOTE
pt c/o left testicle pain and lower abd pain with constipation x several weeks.  denies n/v/d.  Hx: HTN, HIV, rectal ca, prostate ca., colostomy

## 2023-05-29 NOTE — CONSULT NOTE ADULT - ASSESSMENT
57 year old male with hx prostate and anal cancer s/p radiation now with radiation cystitis, urinary obstruction 2/2 bilateral ureteral strictures currently managed with ureteral stents.     AF and clinically stable     - Imaging reviewed: CT demonstrates interval increase in hydronephrosis with stents in place as well as periureteral stranding   - scrotal US consistent with orchitis   - UA and urine culture   - medicine admission for tx of complicated UTI   - patient will likely need IR consult for placement of bilateral nephrostomy tubes considering stent failure     [facility]  [address] [place] [id number] [phone]   57 year old male with hx prostate and anal cancer s/p radiation now with radiation cystitis, urinary obstruction 2/2 bilateral ureteral strictures currently managed with ureteral stents.     AF and clinically stable     - Imaging reviewed: CT demonstrates interval increase in hydronephrosis with stents in place as well as periureteral stranding   - scrotal US consistent with orchitis   - UA and urine culture   - medicine admission for tx of complicated UTI   - will follow     [facility]  [address] [place] [phone]

## 2023-05-29 NOTE — ED ADULT NURSE NOTE - NSFALLUNIVINTERV_ED_ALL_ED
Bed/Stretcher in lowest position, wheels locked, appropriate side rails in place/Call bell, personal items and telephone in reach/Instruct patient to call for assistance before getting out of bed/chair/stretcher/Non-slip footwear applied when patient is off stretcher/[place] to call system/Physically safe environment - no spills, clutter or unnecessary equipment/Purposeful proactive rounding/Room/bathroom lighting operational, light cord in reach

## 2023-05-29 NOTE — ED PROVIDER NOTE - OBJECTIVE STATEMENT
Attending MD Arenas.  Pt is a 58 yo male with pmhx of lung cancer, diverticulosos, anal cancer, prostate cancer, anxiety, depression, HIV (last VL undetectable, CD4+ >200, HLD, HTN with colostomy who presents to ED with acute on chronic abdominal pain at site of colostomy (pain since placement in Nov/22 however inc recently) and ~1 wk increasing L testicular pain.  No trauma.  Denies fevers.  Pt denies abdominal distention/blood in ostomy.  Has had blood in urine long-standing and had ureteral stents for this.  No urinary obstruction endorsed.  No CP/SOB.  Pt abdomen soft, non-distended.  Ostomy in place and clean.  No blood in bag.  Scant soft brown stool.  Site intact without surrounding erythema.  LLQ and RUQ TTP on exam.  No inguinal TTP.  Penis uncircumsized, foreskin easily reducible.  Meatus non-erythematous.  PT has sensation of genitals (had endorsed numbness), BL testicles normal lie, no edema, non-tender spermatocord.  L testicle proper mildly TTP on manipulation.  No palpable hernias.

## 2023-05-30 DIAGNOSIS — Z29.9 ENCOUNTER FOR PROPHYLACTIC MEASURES, UNSPECIFIED: ICD-10-CM

## 2023-05-30 DIAGNOSIS — I10 ESSENTIAL (PRIMARY) HYPERTENSION: ICD-10-CM

## 2023-05-30 DIAGNOSIS — N12 TUBULO-INTERSTITIAL NEPHRITIS, NOT SPECIFIED AS ACUTE OR CHRONIC: ICD-10-CM

## 2023-05-30 DIAGNOSIS — C21.0 MALIGNANT NEOPLASM OF ANUS, UNSPECIFIED: ICD-10-CM

## 2023-05-30 DIAGNOSIS — N13.30 UNSPECIFIED HYDRONEPHROSIS: ICD-10-CM

## 2023-05-30 DIAGNOSIS — N45.2 ORCHITIS: ICD-10-CM

## 2023-05-30 DIAGNOSIS — B20 HUMAN IMMUNODEFICIENCY VIRUS [HIV] DISEASE: ICD-10-CM

## 2023-05-30 LAB
ALBUMIN SERPL ELPH-MCNC: 3.8 G/DL — SIGNIFICANT CHANGE UP (ref 3.3–5)
ALP SERPL-CCNC: 99 U/L — SIGNIFICANT CHANGE UP (ref 40–120)
ALT FLD-CCNC: 10 U/L — SIGNIFICANT CHANGE UP (ref 4–41)
ANION GAP SERPL CALC-SCNC: 15 MMOL/L — HIGH (ref 7–14)
AST SERPL-CCNC: 16 U/L — SIGNIFICANT CHANGE UP (ref 4–40)
BASOPHILS # BLD AUTO: 0.04 K/UL — SIGNIFICANT CHANGE UP (ref 0–0.2)
BASOPHILS NFR BLD AUTO: 0.7 % — SIGNIFICANT CHANGE UP (ref 0–2)
BILIRUB SERPL-MCNC: 0.4 MG/DL — SIGNIFICANT CHANGE UP (ref 0.2–1.2)
BUN SERPL-MCNC: 13 MG/DL — SIGNIFICANT CHANGE UP (ref 7–23)
CALCIUM SERPL-MCNC: 9.4 MG/DL — SIGNIFICANT CHANGE UP (ref 8.4–10.5)
CHLORIDE SERPL-SCNC: 102 MMOL/L — SIGNIFICANT CHANGE UP (ref 98–107)
CO2 SERPL-SCNC: 23 MMOL/L — SIGNIFICANT CHANGE UP (ref 22–31)
CREAT SERPL-MCNC: 0.81 MG/DL — SIGNIFICANT CHANGE UP (ref 0.5–1.3)
CULTURE RESULTS: NO GROWTH — SIGNIFICANT CHANGE UP
EGFR: 103 ML/MIN/1.73M2 — SIGNIFICANT CHANGE UP
EOSINOPHIL # BLD AUTO: 0.07 K/UL — SIGNIFICANT CHANGE UP (ref 0–0.5)
EOSINOPHIL NFR BLD AUTO: 1.2 % — SIGNIFICANT CHANGE UP (ref 0–6)
GLUCOSE SERPL-MCNC: 103 MG/DL — HIGH (ref 70–99)
HCT VFR BLD CALC: 37.9 % — LOW (ref 39–50)
HGB BLD-MCNC: 12.5 G/DL — LOW (ref 13–17)
IANC: 2.51 K/UL — SIGNIFICANT CHANGE UP (ref 1.8–7.4)
IMM GRANULOCYTES NFR BLD AUTO: 0.2 % — SIGNIFICANT CHANGE UP (ref 0–0.9)
LYMPHOCYTES # BLD AUTO: 2.71 K/UL — SIGNIFICANT CHANGE UP (ref 1–3.3)
LYMPHOCYTES # BLD AUTO: 45.4 % — HIGH (ref 13–44)
MAGNESIUM SERPL-MCNC: 2.3 MG/DL — SIGNIFICANT CHANGE UP (ref 1.6–2.6)
MCHC RBC-ENTMCNC: 28.2 PG — SIGNIFICANT CHANGE UP (ref 27–34)
MCHC RBC-ENTMCNC: 33 GM/DL — SIGNIFICANT CHANGE UP (ref 32–36)
MCV RBC AUTO: 85.4 FL — SIGNIFICANT CHANGE UP (ref 80–100)
MONOCYTES # BLD AUTO: 0.63 K/UL — SIGNIFICANT CHANGE UP (ref 0–0.9)
MONOCYTES NFR BLD AUTO: 10.6 % — SIGNIFICANT CHANGE UP (ref 2–14)
NEUTROPHILS # BLD AUTO: 2.51 K/UL — SIGNIFICANT CHANGE UP (ref 1.8–7.4)
NEUTROPHILS NFR BLD AUTO: 41.9 % — LOW (ref 43–77)
NRBC # BLD: 0 /100 WBCS — SIGNIFICANT CHANGE UP (ref 0–0)
NRBC # FLD: 0 K/UL — SIGNIFICANT CHANGE UP (ref 0–0)
PHOSPHATE SERPL-MCNC: 3.8 MG/DL — SIGNIFICANT CHANGE UP (ref 2.5–4.5)
PLATELET # BLD AUTO: 276 K/UL — SIGNIFICANT CHANGE UP (ref 150–400)
POTASSIUM SERPL-MCNC: 4 MMOL/L — SIGNIFICANT CHANGE UP (ref 3.5–5.3)
POTASSIUM SERPL-SCNC: 4 MMOL/L — SIGNIFICANT CHANGE UP (ref 3.5–5.3)
PROT SERPL-MCNC: 7 G/DL — SIGNIFICANT CHANGE UP (ref 6–8.3)
RBC # BLD: 4.44 M/UL — SIGNIFICANT CHANGE UP (ref 4.2–5.8)
RBC # FLD: 13.1 % — SIGNIFICANT CHANGE UP (ref 10.3–14.5)
SODIUM SERPL-SCNC: 140 MMOL/L — SIGNIFICANT CHANGE UP (ref 135–145)
SPECIMEN SOURCE: SIGNIFICANT CHANGE UP
WBC # BLD: 5.97 K/UL — SIGNIFICANT CHANGE UP (ref 3.8–10.5)
WBC # FLD AUTO: 5.97 K/UL — SIGNIFICANT CHANGE UP (ref 3.8–10.5)

## 2023-05-30 PROCEDURE — 12345: CPT | Mod: NC

## 2023-05-30 PROCEDURE — 71045 X-RAY EXAM CHEST 1 VIEW: CPT | Mod: 26

## 2023-05-30 RX ORDER — MORPHINE SULFATE 50 MG/1
2 CAPSULE, EXTENDED RELEASE ORAL ONCE
Refills: 0 | Status: DISCONTINUED | OUTPATIENT
Start: 2023-05-30 | End: 2023-05-30

## 2023-05-30 RX ORDER — TAMSULOSIN HYDROCHLORIDE 0.4 MG/1
0.4 CAPSULE ORAL AT BEDTIME
Refills: 0 | Status: DISCONTINUED | OUTPATIENT
Start: 2023-05-30 | End: 2023-06-01

## 2023-05-30 RX ORDER — MORPHINE SULFATE 50 MG/1
2 CAPSULE, EXTENDED RELEASE ORAL EVERY 4 HOURS
Refills: 0 | Status: DISCONTINUED | OUTPATIENT
Start: 2023-05-30 | End: 2023-05-30

## 2023-05-30 RX ORDER — ATORVASTATIN CALCIUM 80 MG/1
1 TABLET, FILM COATED ORAL
Qty: 0 | Refills: 0 | DISCHARGE

## 2023-05-30 RX ORDER — BICTEGRAVIR SODIUM, EMTRICITABINE, AND TENOFOVIR ALAFENAMIDE FUMARATE 30; 120; 15 MG/1; MG/1; MG/1
1 TABLET ORAL DAILY
Refills: 0 | Status: DISCONTINUED | OUTPATIENT
Start: 2023-05-30 | End: 2023-06-01

## 2023-05-30 RX ORDER — HEPARIN SODIUM 5000 [USP'U]/ML
5000 INJECTION INTRAVENOUS; SUBCUTANEOUS EVERY 12 HOURS
Refills: 0 | Status: DISCONTINUED | OUTPATIENT
Start: 2023-05-30 | End: 2023-06-01

## 2023-05-30 RX ORDER — OLANZAPINE 15 MG/1
10 TABLET, FILM COATED ORAL AT BEDTIME
Refills: 0 | Status: DISCONTINUED | OUTPATIENT
Start: 2023-05-30 | End: 2023-06-01

## 2023-05-30 RX ORDER — ZOLPIDEM TARTRATE 10 MG/1
5 TABLET ORAL AT BEDTIME
Refills: 0 | Status: DISCONTINUED | OUTPATIENT
Start: 2023-05-30 | End: 2023-06-01

## 2023-05-30 RX ORDER — MORPHINE SULFATE 50 MG/1
4 CAPSULE, EXTENDED RELEASE ORAL EVERY 4 HOURS
Refills: 0 | Status: DISCONTINUED | OUTPATIENT
Start: 2023-05-30 | End: 2023-06-01

## 2023-05-30 RX ORDER — ALPRAZOLAM 0.25 MG
1 TABLET ORAL THREE TIMES A DAY
Refills: 0 | Status: DISCONTINUED | OUTPATIENT
Start: 2023-05-30 | End: 2023-06-01

## 2023-05-30 RX ORDER — LACTULOSE 10 G/15ML
15 SOLUTION ORAL DAILY
Refills: 0 | Status: DISCONTINUED | OUTPATIENT
Start: 2023-05-30 | End: 2023-06-01

## 2023-05-30 RX ORDER — VANCOMYCIN HCL 1 G
1250 VIAL (EA) INTRAVENOUS EVERY 12 HOURS
Refills: 0 | Status: DISCONTINUED | OUTPATIENT
Start: 2023-05-30 | End: 2023-05-30

## 2023-05-30 RX ADMIN — MORPHINE SULFATE 2 MILLIGRAM(S): 50 CAPSULE, EXTENDED RELEASE ORAL at 13:49

## 2023-05-30 RX ADMIN — LACTULOSE 15 GRAM(S): 10 SOLUTION ORAL at 11:27

## 2023-05-30 RX ADMIN — TAMSULOSIN HYDROCHLORIDE 0.4 MILLIGRAM(S): 0.4 CAPSULE ORAL at 22:31

## 2023-05-30 RX ADMIN — MORPHINE SULFATE 2 MILLIGRAM(S): 50 CAPSULE, EXTENDED RELEASE ORAL at 18:40

## 2023-05-30 RX ADMIN — MORPHINE SULFATE 2 MILLIGRAM(S): 50 CAPSULE, EXTENDED RELEASE ORAL at 09:52

## 2023-05-30 RX ADMIN — Medication 166.67 MILLIGRAM(S): at 06:31

## 2023-05-30 RX ADMIN — MORPHINE SULFATE 2 MILLIGRAM(S): 50 CAPSULE, EXTENDED RELEASE ORAL at 10:24

## 2023-05-30 RX ADMIN — Medication 650 MILLIGRAM(S): at 16:50

## 2023-05-30 RX ADMIN — MORPHINE SULFATE 4 MILLIGRAM(S): 50 CAPSULE, EXTENDED RELEASE ORAL at 22:30

## 2023-05-30 RX ADMIN — OLANZAPINE 10 MILLIGRAM(S): 15 TABLET, FILM COATED ORAL at 22:31

## 2023-05-30 RX ADMIN — HEPARIN SODIUM 5000 UNIT(S): 5000 INJECTION INTRAVENOUS; SUBCUTANEOUS at 17:46

## 2023-05-30 RX ADMIN — MORPHINE SULFATE 2 MILLIGRAM(S): 50 CAPSULE, EXTENDED RELEASE ORAL at 17:49

## 2023-05-30 RX ADMIN — MORPHINE SULFATE 4 MILLIGRAM(S): 50 CAPSULE, EXTENDED RELEASE ORAL at 23:30

## 2023-05-30 RX ADMIN — BICTEGRAVIR SODIUM, EMTRICITABINE, AND TENOFOVIR ALAFENAMIDE FUMARATE 1 TABLET(S): 30; 120; 15 TABLET ORAL at 11:26

## 2023-05-30 RX ADMIN — Medication 650 MILLIGRAM(S): at 17:45

## 2023-05-30 RX ADMIN — Medication 650 MILLIGRAM(S): at 06:32

## 2023-05-30 RX ADMIN — HEPARIN SODIUM 5000 UNIT(S): 5000 INJECTION INTRAVENOUS; SUBCUTANEOUS at 05:36

## 2023-05-30 RX ADMIN — MORPHINE SULFATE 2 MILLIGRAM(S): 50 CAPSULE, EXTENDED RELEASE ORAL at 14:30

## 2023-05-30 RX ADMIN — CEFTRIAXONE 100 MILLIGRAM(S): 500 INJECTION, POWDER, FOR SOLUTION INTRAMUSCULAR; INTRAVENOUS at 11:30

## 2023-05-30 RX ADMIN — ZOLPIDEM TARTRATE 5 MILLIGRAM(S): 10 TABLET ORAL at 00:42

## 2023-05-30 RX ADMIN — Medication 1 TABLET(S): at 11:26

## 2023-05-30 RX ADMIN — ZOLPIDEM TARTRATE 5 MILLIGRAM(S): 10 TABLET ORAL at 03:21

## 2023-05-30 NOTE — CHART NOTE - NSCHARTNOTEFT_GEN_A_CORE
IR recommendations reviewed. Urology updated and will evaluate to consider exhange of ureteral stents.

## 2023-05-30 NOTE — H&P ADULT - NSHPREVIEWOFSYSTEMS_GEN_ALL_CORE
REVIEW OF SYSTEMS:    CONSTITUTIONAL: No weakness, fevers or chills  EYES/ENT: No visual changes;  No dysphagia; No sore throat; No rhinorrhea; No sinus pain/pressure  NECK: No pain or stiffness  RESPIRATORY: No cough, wheezing, hemoptysis; No shortness of breath  CARDIOVASCULAR: No chest pain or palpitations; No lower extremity edema  GASTROINTESTINAL: + abdominal pain. No nausea, vomiting, or hematemesis; No diarrhea or constipation. No melena or hematochezia.  GENITOURINARY: + dysuria, no frequency, + hematuria  NEUROLOGICAL: No numbness or weakness  MSK: ambulates without assistance  SKIN: No itching, burning, rashes, or lesions   All other review of systems is negative unless indicated above.

## 2023-05-30 NOTE — CONSULT NOTE ADULT - ASSESSMENT
Interventional Radiology    Evaluate for Procedure:     HPI: 57y Male with HIV (VL undetectable, CD4 499), hx prostate and anal cancer s/p radiation now with radiation cystitis, urinary obstruction 2/2 bilateral ureteral strictures currently managed with ureteral stents. Stents placed 2023. Nephrostomy tubes removed 2023.    Allergies: No Known Allergies    Medications (Abx/Cardiac/Anticoagulation/Blood Products)    bictegravir 50 mG/emtricitabine 200 mG/tenofovir alafenamide 25 mG (BIKTARVY): 1 Tablet(s) Oral ( @ 11:26)  cefTRIAXone   IVPB: 100 mL/Hr IV Intermittent ( @ 11:30)  cefTRIAXone   IVPB: 100 mL/Hr IV Intermittent ( @ 12:00)  heparin   Injectable: 5000 Unit(s) SubCutaneous ( @ 17:46)  trimethoprim  160 mG/sulfamethoxazole 800 m Tablet(s) Oral ( @ 11:26)  vancomycin  IVPB: 166.67 mL/Hr IV Intermittent ( @ 06:31)    Data:    T(C): 36.8  HR: 93  BP: 139/105  RR: 18  SpO2: 100%    -WBC 5.97 / HgB 12.5 / Hct 37.9 / Plt 276  -Na 140 / Cl 102 / BUN 13 / Glucose 103  -K 4.0 / CO2 23 / Cr 0.81  -ALT 10 / Alk Phos 99 / T.Bili 0.4  -INR 1.11 / PTT 31.7      Radiology:     Assessment/Plan: 57y Male with HIV (VL undetectable, CD4 499), hx prostate and anal cancer s/p radiation now with radiation cystitis, urinary obstruction 2/2 bilateral ureteral strictures currently managed with ureteral stents. Stents placed 2023. Nephrostomy tubes removed 2023. CT  with moderate to severe hydronephrosis, increased from prior. IR consulted for bilateral nephrostomy tube placement.    -- Given ureteral stents placed approximately 4 months ago, recommend stent exchange at this time. Nephrostomy tube placement not currently indicated.  -- Plan discussed with provider JOSE House.  -- IR to sign off at this time, please re consult as needed.

## 2023-05-30 NOTE — H&P ADULT - PROBLEM SELECTOR PLAN 3
Assessment:  - patient complaining of L testicular pain  - CT shows L scrotal edema and enlargement, U/S unremarkable    Plan:  - abx as above

## 2023-05-30 NOTE — H&P ADULT - HISTORY OF PRESENT ILLNESS
This is a 58 y/o M with pmhx of  lung cancer, diverticulosos, anal cancer, prostate cancer, anxiety, depression, HIV (last VL undetectable, CD4+ >200, HLD, HTN with colostomy, presented to the ED for new onset abdominal pain for 2 weeks. The patient denies diarrhea. Had sharp pain on the b/l flank areas. Also endorsed dysuria with bloody urine. The patient denies nausea and vomiting. He also complains of L testicular pain, denies trauma to the area. Denies fevers. He has known ureteral stents in the past because of anal cancer and the side effects he received from radiation therapy. He had a colostomy bag from anal cancer surgery. Complaint with medications.

## 2023-05-30 NOTE — CONSULT NOTE ADULT - REASON FOR ADMISSION
abdominal pain <<----- Click to add NO pertinent Past Medical History No pertinent past medical history

## 2023-05-30 NOTE — H&P ADULT - NSICDXPASTMEDICALHX_GEN_ALL_CORE_FT


- no collado, No CVA or SP tenderness





DIAGNOSIS/ASSESSMENT


Assessment & Plan


YAYA 











TIARA MARIE MD Sep 7, 2018 12:01 PAST MEDICAL HISTORY:  Anal cancer     Anxiety     Depressive disorder     Diverticulosis     HIV infection     HLD (hyperlipidemia)     HTN (hypertension)     Lung cancer     Prostate cancer

## 2023-05-30 NOTE — H&P ADULT - PROBLEM SELECTOR PLAN 1
Assessment:  - patient presented to the ED for dysuria  - CT shows Persistent periureteral and perinephric stranding b/l  - UCx previously grew E faecalis and klebsella pneumonia    Plan:  - c/w vanco and ceftriaxone for now for empiric abx  - blood cultures and urine cultures pending  - monitor for sepsis  - ID consult

## 2023-05-30 NOTE — H&P ADULT - NSHPPHYSICALEXAM_GEN_ALL_CORE
PHYSICAL EXAM:  VITALS: Vital Signs Last 24 Hrs  T(C): 36.4 (29 May 2023 21:30), Max: 36.6 (29 May 2023 08:48)  T(F): 97.5 (29 May 2023 21:30), Max: 97.8 (29 May 2023 08:48)  HR: 92 (29 May 2023 21:30) (80 - 109)  BP: 135/97 (29 May 2023 21:30) (114/83 - 135/97)  BP(mean): --  RR: 18 (29 May 2023 21:30) (10 - 18)  SpO2: 100% (29 May 2023 21:30) (100% - 100%)    Parameters below as of 29 May 2023 21:30  Patient On (Oxygen Delivery Method): room air      GENERAL: NAD, comfortable at bedside  HEAD:  Atraumatic, Normocephalic  EYES: EOMI, PERRL, conjunctiva and sclera clear  ENT: Moist Mucus Membranes present, no ulcers appreciated  NECK: Supple, No JVD  CHEST/LUNG: Clear to auscultation bilaterally; No wheezes, rales or rhonchi, no accessory muscle use  HEART: Regular rate and rhythm; No murmurs, rubs, or gallops, (+)S1, S2  ABDOMEN: Soft, Nontender, Nondistended; Normal Bowel sounds, + colostomy bag  EXTREMITIES:  2+ Peripheral Pulses, No clubbing, cyanosis, or edema  PSYCH: normal mood and affect  NEUROLOGY: AAOx3, non-focal  SKIN: No rashes or lesions  : mild tenderness on the L testicle, no swelling, no erythema, no penile discharge

## 2023-05-30 NOTE — H&P ADULT - PROBLEM SELECTOR PLAN 2
Assessment:  - patient found to have b/l worsening hydronephrosis  - urology consulted - recommend IR consult for nephrostomy tube placement    Plan:  - IR consult

## 2023-05-30 NOTE — H&P ADULT - NSHPLABSRESULTS_GEN_ALL_CORE
12.7   6.85  )-----------( 270      ( 29 May 2023 10:10 )             39.0           142  |  105  |  18  ----------------------------<  130<H>  3.8   |  23  |  0.98    Ca    9.3      29 May 2023 10:10    TPro  7.1  /  Alb  4.0  /  TBili  <0.2  /  DBili  x   /  AST  16  /  ALT  12  /  AlkPhos  96                      Urinalysis Basic - ( 29 May 2023 10:10 )    Color: Light Red / Appearance: Turbid / S.021 / pH: x  Gluc: x / Ketone: Negative  / Bili: Negative / Urobili: <2 mg/dL   Blood: x / Protein: 300 mg/dL / Nitrite: Negative   Leuk Esterase: Large / RBC: MANY /HPF / WBC 25 /HPF   Sq Epi: x / Non Sq Epi: x / Bacteria: Few            CXR: As per my read - pending, Will wait for prelim/official read    EKG: As per my read - pending 12.7   6.85  )-----------( 270      ( 29 May 2023 10:10 )             39.0           142  |  105  |  18  ----------------------------<  130<H>  3.8   |  23  |  0.98    Ca    9.3      29 May 2023 10:10    TPro  7.1  /  Alb  4.0  /  TBili  <0.2  /  DBili  x   /  AST  16  /  ALT  12  /  AlkPhos  96                      Urinalysis Basic - ( 29 May 2023 10:10 )    Color: Light Red / Appearance: Turbid / S.021 / pH: x  Gluc: x / Ketone: Negative  / Bili: Negative / Urobili: <2 mg/dL   Blood: x / Protein: 300 mg/dL / Nitrite: Negative   Leuk Esterase: Large / RBC: MANY /HPF / WBC 25 /HPF   Sq Epi: x / Non Sq Epi: x / Bacteria: Few            CXR: As per my read - no obvious opacities, Will wait for prelim/official read    EKG: As per my read - pending

## 2023-05-31 ENCOUNTER — TRANSCRIPTION ENCOUNTER (OUTPATIENT)
Age: 58
End: 2023-05-31

## 2023-05-31 ENCOUNTER — NON-APPOINTMENT (OUTPATIENT)
Age: 58
End: 2023-05-31

## 2023-05-31 LAB
ANION GAP SERPL CALC-SCNC: 11 MMOL/L — SIGNIFICANT CHANGE UP (ref 7–14)
BUN SERPL-MCNC: 13 MG/DL — SIGNIFICANT CHANGE UP (ref 7–23)
CALCIUM SERPL-MCNC: 9.2 MG/DL — SIGNIFICANT CHANGE UP (ref 8.4–10.5)
CHLORIDE SERPL-SCNC: 104 MMOL/L — SIGNIFICANT CHANGE UP (ref 98–107)
CO2 SERPL-SCNC: 25 MMOL/L — SIGNIFICANT CHANGE UP (ref 22–31)
CREAT SERPL-MCNC: 0.94 MG/DL — SIGNIFICANT CHANGE UP (ref 0.5–1.3)
EGFR: 95 ML/MIN/1.73M2 — SIGNIFICANT CHANGE UP
GLUCOSE SERPL-MCNC: 92 MG/DL — SIGNIFICANT CHANGE UP (ref 70–99)
HCT VFR BLD CALC: 37.2 % — LOW (ref 39–50)
HGB BLD-MCNC: 12.1 G/DL — LOW (ref 13–17)
MAGNESIUM SERPL-MCNC: 2.1 MG/DL — SIGNIFICANT CHANGE UP (ref 1.6–2.6)
MCHC RBC-ENTMCNC: 28.1 PG — SIGNIFICANT CHANGE UP (ref 27–34)
MCHC RBC-ENTMCNC: 32.5 GM/DL — SIGNIFICANT CHANGE UP (ref 32–36)
MCV RBC AUTO: 86.3 FL — SIGNIFICANT CHANGE UP (ref 80–100)
NRBC # BLD: 0 /100 WBCS — SIGNIFICANT CHANGE UP (ref 0–0)
NRBC # FLD: 0 K/UL — SIGNIFICANT CHANGE UP (ref 0–0)
PHOSPHATE SERPL-MCNC: 4.5 MG/DL — SIGNIFICANT CHANGE UP (ref 2.5–4.5)
PLATELET # BLD AUTO: 255 K/UL — SIGNIFICANT CHANGE UP (ref 150–400)
POTASSIUM SERPL-MCNC: 3.5 MMOL/L — SIGNIFICANT CHANGE UP (ref 3.5–5.3)
POTASSIUM SERPL-SCNC: 3.5 MMOL/L — SIGNIFICANT CHANGE UP (ref 3.5–5.3)
RBC # BLD: 4.31 M/UL — SIGNIFICANT CHANGE UP (ref 4.2–5.8)
RBC # FLD: 13.2 % — SIGNIFICANT CHANGE UP (ref 10.3–14.5)
SODIUM SERPL-SCNC: 140 MMOL/L — SIGNIFICANT CHANGE UP (ref 135–145)
WBC # BLD: 4.75 K/UL — SIGNIFICANT CHANGE UP (ref 3.8–10.5)
WBC # FLD AUTO: 4.75 K/UL — SIGNIFICANT CHANGE UP (ref 3.8–10.5)

## 2023-05-31 PROCEDURE — 99233 SBSQ HOSP IP/OBS HIGH 50: CPT

## 2023-05-31 PROCEDURE — 93010 ELECTROCARDIOGRAM REPORT: CPT

## 2023-05-31 RX ORDER — METOPROLOL TARTRATE 50 MG
50 TABLET ORAL
Refills: 0 | Status: DISCONTINUED | OUTPATIENT
Start: 2023-05-31 | End: 2023-06-01

## 2023-05-31 RX ORDER — POLYETHYLENE GLYCOL 3350 17 G/17G
17 POWDER, FOR SOLUTION ORAL DAILY
Refills: 0 | Status: DISCONTINUED | OUTPATIENT
Start: 2023-05-31 | End: 2023-06-01

## 2023-05-31 RX ORDER — SENNA PLUS 8.6 MG/1
2 TABLET ORAL AT BEDTIME
Refills: 0 | Status: DISCONTINUED | OUTPATIENT
Start: 2023-05-31 | End: 2023-06-01

## 2023-05-31 RX ADMIN — MORPHINE SULFATE 4 MILLIGRAM(S): 50 CAPSULE, EXTENDED RELEASE ORAL at 19:14

## 2023-05-31 RX ADMIN — CEFTRIAXONE 100 MILLIGRAM(S): 500 INJECTION, POWDER, FOR SOLUTION INTRAMUSCULAR; INTRAVENOUS at 11:26

## 2023-05-31 RX ADMIN — OLANZAPINE 10 MILLIGRAM(S): 15 TABLET, FILM COATED ORAL at 22:25

## 2023-05-31 RX ADMIN — MORPHINE SULFATE 4 MILLIGRAM(S): 50 CAPSULE, EXTENDED RELEASE ORAL at 23:46

## 2023-05-31 RX ADMIN — MORPHINE SULFATE 4 MILLIGRAM(S): 50 CAPSULE, EXTENDED RELEASE ORAL at 23:28

## 2023-05-31 RX ADMIN — TAMSULOSIN HYDROCHLORIDE 0.4 MILLIGRAM(S): 0.4 CAPSULE ORAL at 22:25

## 2023-05-31 RX ADMIN — BICTEGRAVIR SODIUM, EMTRICITABINE, AND TENOFOVIR ALAFENAMIDE FUMARATE 1 TABLET(S): 30; 120; 15 TABLET ORAL at 11:26

## 2023-05-31 RX ADMIN — HEPARIN SODIUM 5000 UNIT(S): 5000 INJECTION INTRAVENOUS; SUBCUTANEOUS at 18:22

## 2023-05-31 RX ADMIN — Medication 50 MILLIGRAM(S): at 15:07

## 2023-05-31 RX ADMIN — MORPHINE SULFATE 4 MILLIGRAM(S): 50 CAPSULE, EXTENDED RELEASE ORAL at 16:00

## 2023-05-31 RX ADMIN — MORPHINE SULFATE 4 MILLIGRAM(S): 50 CAPSULE, EXTENDED RELEASE ORAL at 09:44

## 2023-05-31 RX ADMIN — MORPHINE SULFATE 4 MILLIGRAM(S): 50 CAPSULE, EXTENDED RELEASE ORAL at 15:08

## 2023-05-31 RX ADMIN — SENNA PLUS 2 TABLET(S): 8.6 TABLET ORAL at 22:25

## 2023-05-31 RX ADMIN — HEPARIN SODIUM 5000 UNIT(S): 5000 INJECTION INTRAVENOUS; SUBCUTANEOUS at 06:34

## 2023-05-31 RX ADMIN — Medication 1 TABLET(S): at 11:26

## 2023-05-31 RX ADMIN — MORPHINE SULFATE 4 MILLIGRAM(S): 50 CAPSULE, EXTENDED RELEASE ORAL at 10:40

## 2023-05-31 RX ADMIN — LACTULOSE 15 GRAM(S): 10 SOLUTION ORAL at 11:25

## 2023-05-31 RX ADMIN — POLYETHYLENE GLYCOL 3350 17 GRAM(S): 17 POWDER, FOR SOLUTION ORAL at 12:21

## 2023-05-31 NOTE — PROVIDER CONTACT NOTE (OTHER) - ASSESSMENT
AOx4. Denies pain, chest pain, SOB at this time. Oral temp 98.2
AOx4. Denies chest pain, SOB, headache, dizziness, blurry vision. Endorses 8/10 abdominal pain (PRN meds given)

## 2023-06-01 ENCOUNTER — TRANSCRIPTION ENCOUNTER (OUTPATIENT)
Age: 58
End: 2023-06-01

## 2023-06-01 VITALS
HEART RATE: 102 BPM | OXYGEN SATURATION: 100 % | SYSTOLIC BLOOD PRESSURE: 118 MMHG | DIASTOLIC BLOOD PRESSURE: 81 MMHG | TEMPERATURE: 98 F | RESPIRATION RATE: 18 BRPM

## 2023-06-01 LAB
ALBUMIN SERPL ELPH-MCNC: 3.6 G/DL — SIGNIFICANT CHANGE UP (ref 3.3–5)
ALP SERPL-CCNC: 88 U/L — SIGNIFICANT CHANGE UP (ref 40–120)
ALT FLD-CCNC: 10 U/L — SIGNIFICANT CHANGE UP (ref 4–41)
ANION GAP SERPL CALC-SCNC: 14 MMOL/L — SIGNIFICANT CHANGE UP (ref 7–14)
AST SERPL-CCNC: 12 U/L — SIGNIFICANT CHANGE UP (ref 4–40)
BASOPHILS # BLD AUTO: 0.04 K/UL — SIGNIFICANT CHANGE UP (ref 0–0.2)
BASOPHILS NFR BLD AUTO: 0.8 % — SIGNIFICANT CHANGE UP (ref 0–2)
BILIRUB SERPL-MCNC: 0.3 MG/DL — SIGNIFICANT CHANGE UP (ref 0.2–1.2)
BUN SERPL-MCNC: 14 MG/DL — SIGNIFICANT CHANGE UP (ref 7–23)
CALCIUM SERPL-MCNC: 9.2 MG/DL — SIGNIFICANT CHANGE UP (ref 8.4–10.5)
CHLORIDE SERPL-SCNC: 104 MMOL/L — SIGNIFICANT CHANGE UP (ref 98–107)
CO2 SERPL-SCNC: 23 MMOL/L — SIGNIFICANT CHANGE UP (ref 22–31)
CREAT SERPL-MCNC: 1.06 MG/DL — SIGNIFICANT CHANGE UP (ref 0.5–1.3)
EGFR: 82 ML/MIN/1.73M2 — SIGNIFICANT CHANGE UP
EOSINOPHIL # BLD AUTO: 0.08 K/UL — SIGNIFICANT CHANGE UP (ref 0–0.5)
EOSINOPHIL NFR BLD AUTO: 1.6 % — SIGNIFICANT CHANGE UP (ref 0–6)
GLUCOSE SERPL-MCNC: 83 MG/DL — SIGNIFICANT CHANGE UP (ref 70–99)
HCT VFR BLD CALC: 35.8 % — LOW (ref 39–50)
HGB BLD-MCNC: 11.8 G/DL — LOW (ref 13–17)
IANC: 2.03 K/UL — SIGNIFICANT CHANGE UP (ref 1.8–7.4)
IMM GRANULOCYTES NFR BLD AUTO: 0.4 % — SIGNIFICANT CHANGE UP (ref 0–0.9)
LYMPHOCYTES # BLD AUTO: 2.03 K/UL — SIGNIFICANT CHANGE UP (ref 1–3.3)
LYMPHOCYTES # BLD AUTO: 41.2 % — SIGNIFICANT CHANGE UP (ref 13–44)
MAGNESIUM SERPL-MCNC: 2.1 MG/DL — SIGNIFICANT CHANGE UP (ref 1.6–2.6)
MCHC RBC-ENTMCNC: 29.2 PG — SIGNIFICANT CHANGE UP (ref 27–34)
MCHC RBC-ENTMCNC: 33 GM/DL — SIGNIFICANT CHANGE UP (ref 32–36)
MCV RBC AUTO: 88.6 FL — SIGNIFICANT CHANGE UP (ref 80–100)
MONOCYTES # BLD AUTO: 0.73 K/UL — SIGNIFICANT CHANGE UP (ref 0–0.9)
MONOCYTES NFR BLD AUTO: 14.8 % — HIGH (ref 2–14)
NEUTROPHILS # BLD AUTO: 2.03 K/UL — SIGNIFICANT CHANGE UP (ref 1.8–7.4)
NEUTROPHILS NFR BLD AUTO: 41.2 % — LOW (ref 43–77)
NRBC # BLD: 0 /100 WBCS — SIGNIFICANT CHANGE UP (ref 0–0)
NRBC # FLD: 0 K/UL — SIGNIFICANT CHANGE UP (ref 0–0)
PHOSPHATE SERPL-MCNC: 4.4 MG/DL — SIGNIFICANT CHANGE UP (ref 2.5–4.5)
PLATELET # BLD AUTO: 242 K/UL — SIGNIFICANT CHANGE UP (ref 150–400)
POTASSIUM SERPL-MCNC: 3.6 MMOL/L — SIGNIFICANT CHANGE UP (ref 3.5–5.3)
POTASSIUM SERPL-SCNC: 3.6 MMOL/L — SIGNIFICANT CHANGE UP (ref 3.5–5.3)
PROT SERPL-MCNC: 6.5 G/DL — SIGNIFICANT CHANGE UP (ref 6–8.3)
RBC # BLD: 4.04 M/UL — LOW (ref 4.2–5.8)
RBC # FLD: 13.3 % — SIGNIFICANT CHANGE UP (ref 10.3–14.5)
SODIUM SERPL-SCNC: 141 MMOL/L — SIGNIFICANT CHANGE UP (ref 135–145)
WBC # BLD: 4.93 K/UL — SIGNIFICANT CHANGE UP (ref 3.8–10.5)
WBC # FLD AUTO: 4.93 K/UL — SIGNIFICANT CHANGE UP (ref 3.8–10.5)

## 2023-06-01 PROCEDURE — 52332 CYSTOSCOPY AND TREATMENT: CPT | Mod: 50

## 2023-06-01 PROCEDURE — 99239 HOSP IP/OBS DSCHRG MGMT >30: CPT

## 2023-06-01 DEVICE — GUIDEWIRE SENSOR NITINOL ANGLED .038" X 150CM: Type: IMPLANTABLE DEVICE | Status: FUNCTIONAL

## 2023-06-01 DEVICE — URETERAL CATH AXXCESS OPEN END 6FR 70CM: Type: IMPLANTABLE DEVICE | Status: FUNCTIONAL

## 2023-06-01 DEVICE — KIT URET PERFLX PLUS 6FRX26CM: Type: IMPLANTABLE DEVICE | Status: FUNCTIONAL

## 2023-06-01 RX ORDER — HYDROMORPHONE HYDROCHLORIDE 2 MG/ML
0.5 INJECTION INTRAMUSCULAR; INTRAVENOUS; SUBCUTANEOUS
Refills: 0 | Status: DISCONTINUED | OUTPATIENT
Start: 2023-06-01 | End: 2023-06-01

## 2023-06-01 RX ORDER — ACETAMINOPHEN 500 MG
2 TABLET ORAL
Qty: 0 | Refills: 0 | DISCHARGE
Start: 2023-06-01

## 2023-06-01 RX ORDER — AMLODIPINE BESYLATE 2.5 MG/1
1 TABLET ORAL
Qty: 0 | Refills: 0 | DISCHARGE

## 2023-06-01 RX ORDER — ACETAMINOPHEN 500 MG
1000 TABLET ORAL ONCE
Refills: 0 | Status: COMPLETED | OUTPATIENT
Start: 2023-06-01 | End: 2023-06-01

## 2023-06-01 RX ORDER — SENNA PLUS 8.6 MG/1
2 TABLET ORAL
Qty: 60 | Refills: 0
Start: 2023-06-01 | End: 2023-06-30

## 2023-06-01 RX ORDER — POLYETHYLENE GLYCOL 3350 17 G/17G
17 POWDER, FOR SOLUTION ORAL
Qty: 170 | Refills: 0
Start: 2023-06-01 | End: 2023-06-10

## 2023-06-01 RX ORDER — LEVOFLOXACIN 5 MG/ML
1 INJECTION, SOLUTION INTRAVENOUS
Qty: 4 | Refills: 0
Start: 2023-06-01 | End: 2023-06-04

## 2023-06-01 RX ADMIN — Medication 1 TABLET(S): at 12:31

## 2023-06-01 RX ADMIN — MORPHINE SULFATE 4 MILLIGRAM(S): 50 CAPSULE, EXTENDED RELEASE ORAL at 05:54

## 2023-06-01 RX ADMIN — LACTULOSE 15 GRAM(S): 10 SOLUTION ORAL at 12:21

## 2023-06-01 RX ADMIN — Medication 50 MILLIGRAM(S): at 17:16

## 2023-06-01 RX ADMIN — HEPARIN SODIUM 5000 UNIT(S): 5000 INJECTION INTRAVENOUS; SUBCUTANEOUS at 05:54

## 2023-06-01 RX ADMIN — HEPARIN SODIUM 5000 UNIT(S): 5000 INJECTION INTRAVENOUS; SUBCUTANEOUS at 17:16

## 2023-06-01 RX ADMIN — POLYETHYLENE GLYCOL 3350 17 GRAM(S): 17 POWDER, FOR SOLUTION ORAL at 12:32

## 2023-06-01 RX ADMIN — Medication 1000 MILLIGRAM(S): at 12:38

## 2023-06-01 RX ADMIN — Medication 50 MILLIGRAM(S): at 05:54

## 2023-06-01 RX ADMIN — BICTEGRAVIR SODIUM, EMTRICITABINE, AND TENOFOVIR ALAFENAMIDE FUMARATE 1 TABLET(S): 30; 120; 15 TABLET ORAL at 12:32

## 2023-06-01 RX ADMIN — MORPHINE SULFATE 4 MILLIGRAM(S): 50 CAPSULE, EXTENDED RELEASE ORAL at 12:15

## 2023-06-01 RX ADMIN — Medication 400 MILLIGRAM(S): at 12:23

## 2023-06-01 RX ADMIN — MORPHINE SULFATE 4 MILLIGRAM(S): 50 CAPSULE, EXTENDED RELEASE ORAL at 12:30

## 2023-06-01 NOTE — DISCHARGE NOTE NURSING/CASE MANAGEMENT/SOCIAL WORK - PATIENT PORTAL LINK FT
You can access the FollowMyHealth Patient Portal offered by [facility] by registering at the following website: [email or web address]. By joining [employer]’s FollowMyHealth portal, you will also be able to view your health information using other applications (apps) compatible with our system.

## 2023-06-01 NOTE — DISCHARGE NOTE PROVIDER - NSDCMRMEDTOKEN_GEN_ALL_CORE_FT
ALPRAZolam 1 mg oral tablet: 1 tab(s) orally 3 times a day, As Needed  amLODIPine 5 mg oral tablet: 1 tab(s) orally once a day  Bactrim  mg-160 mg oral tablet: 1 tab(s) orally once a day  bictegravir/emtricitabine/tenofovir 50 mg-200 mg-25 mg oral tablet: 1 tab(s) orally once a day  ciprofloxacin 500 mg oral tablet: 2 tab(s) orally once a day  DilTIAZem (Eqv-Cardizem CD) 240 mg/24 hours oral capsule, extended release: 1 cap(s) orally once a day  ezetimibe 10 mg oral tablet: 1 tab(s) orally once a day  lactulose 10 g/15 mL oral syrup: 15 gram(s) orally once a day  metoprolol succinate 100 mg oral tablet, extended release: 1 tab(s) orally once a day  OLANZapine 10 mg oral tablet: 1 tab(s) orally once a day (at bedtime)  tamsulosin 0.4 mg oral capsule: 1 cap(s) orally once a day (at bedtime)  zolpidem 10 mg oral tablet: 1 tab(s) orally once a day (at bedtime)   acetaminophen 325 mg oral tablet: 2 tab(s) orally every 6 hours As needed Temp greater or equal to 38C (100.4F), Mild Pain (1 - 3)  ALPRAZolam 1 mg oral tablet: 1 tab(s) orally 3 times a day, As Needed  Bactrim  mg-160 mg oral tablet: 1 tab(s) orally once a day  bictegravir/emtricitabine/tenofovir 50 mg-200 mg-25 mg oral tablet: 1 tab(s) orally once a day  DilTIAZem (Eqv-Cardizem CD) 240 mg/24 hours oral capsule, extended release: 1 cap(s) orally once a day  ezetimibe 10 mg oral tablet: 1 tab(s) orally once a day  lactulose 10 g/15 mL oral syrup: 15 gram(s) orally once a day  levoFLOXacin 750 mg oral tablet: 1 tab(s) orally every 24 hours stop after 4 days  metoprolol succinate 100 mg oral tablet, extended release: 1 tab(s) orally once a day  OLANZapine 10 mg oral tablet: 1 tab(s) orally once a day (at bedtime)  polyethylene glycol 3350 oral powder for reconstitution: 17 gram(s) orally once a day  senna leaf extract oral tablet: 2 tab(s) orally once a day (at bedtime)  tamsulosin 0.4 mg oral capsule: 1 cap(s) orally once a day (at bedtime)  zolpidem 10 mg oral tablet: 1 tab(s) orally once a day (at bedtime)

## 2023-06-01 NOTE — DISCHARGE NOTE PROVIDER - NSDCCPCAREPLAN_GEN_ALL_CORE_FT
PRINCIPAL DISCHARGE DIAGNOSIS  Diagnosis: Pyelonephritis  Assessment and Plan of Treatment: You were admitted for a kidney infection likely related to failure of your ureteral stents. You had your stents replaced on June 1st. Follow up with urology in 3 months. Complete levaquin for treatment of infection.  Continue to take medications as prescribed. Return to the ED if you have fevers, chills, severe pain, nausea, or any other concerning symptoms.

## 2023-06-01 NOTE — DISCHARGE NOTE PROVIDER - PROVIDER TOKENS
PROVIDER:[TOKEN:[[id number]],FOLLOWUP:[2 months]] PROVIDER:[TOKEN:[[id number]],FOLLOWUP:[2 months]],FREE:[LAST:[Dr. Ronald Gil MD],PHONE:[phone]],FAX:[(   )    -],ADDRESS:[Internist  [address] [address]]]

## 2023-06-01 NOTE — DISCHARGE NOTE PROVIDER - HOSPITAL COURSE
57M with HIV (VL undetectable, CD4 499), HLD, HTN, diverticulosis, rectal/prostate cancer s/p radiation with perforation of recum/anus s/p abdominoperitoneal resection with end colostomy, left groin abscess s/p IR drain (Sept 2022), b/l ureteral stent placement for radiation cystitis, presents with abdominal pain, dysuria and hydronephrosis 2/2 likely pyelonephritis.    Pt had a CT which was significant for worsening hydronephrosis and testicular US consistent with orchitis. Urology and IR were consulted for evaluation and management of hydronephrosis. Pt is s/p b/l ureteral stent exchange with urology on 6/1 without complication.     UA was consistent with pyelonephritis, and urine culture had no growth to date. Pt was started on CTX while inpatient and was transitioned to oral levaquin to complete a 7 day course.     Pt was found to have tachycardia in the setting of holding home metoprolol and cardizem. Metoprolol was resumed and HR improved.     Pt was discharged home in stable condition. All questions answered. 57M with HIV (VL undetectable, CD4 499), HLD, HTN, diverticulosis, rectal/prostate cancer s/p radiation with perforation of recum/anus s/p abdominoperitoneal resection with end colostomy, left groin abscess s/p IR drain (Sept 2022), b/l ureteral stent placement for radiation cystitis, presents with abdominal pain, dysuria and hydronephrosis 2/2 likely pyelonephritis.    Pt had a CT which was significant for worsening hydronephrosis and testicular US consistent with orchitis. Urology and IR were consulted for evaluation and management of hydronephrosis. Pt is s/p b/l ureteral stent exchange with urology on 6/1 without complication.     UA was consistent with pyelonephritis, and urine culture had no growth to date. Pt was started on CTX while inpatient and was transitioned to oral levaquin to complete a 7 day course.     Pt was found to have tachycardia in the setting of holding home metoprolol and cardizem. Metoprolol was resumed and HR improved.     Pt was discharged home in stable condition. All questions answered.     New Medications: Levaquin x4 days  Discontinued Medications: None  Follow up: Outpatient urology in 3 months

## 2023-06-01 NOTE — DISCHARGE NOTE PROVIDER - CARE PROVIDER_API CALL
Ravi Mathew  Urology  [address]  [place] [address]  Phone: [phone]  Fax: [phone]  Follow Up Time: 2 months   Ravi Mathew  Urology  [address]  [place] [id number]  Phone: [phone]  Fax: [phone]  Follow Up Time: 2 months    Dr. Ronald Gil MD,   Internist  [address]  [address]  [place] [address]  Phone: [phone]  Fax: (   )    -  Follow Up Time:

## 2023-06-01 NOTE — PROGRESS NOTE ADULT - PROBLEM SELECTOR PLAN 4
- c/w biktarvy  - CD4 500, VL undetectable

## 2023-06-01 NOTE — DISCHARGE NOTE PROVIDER - NSDCCPTREATMENT_GEN_ALL_CORE_FT
PRINCIPAL PROCEDURE  Procedure: CT abdomen  Findings and Treatment: IMPRESSION:  Bilateral ureteral stents with interval worsening of moderate severe   right and moderate left hydronephrosis.  Persistent periureteral and perinephric stranding. Cannot exclude   infection/inflammation of the ureters  Left scrotal edema and enlargement. Scrotal ultrasound may be considered   for further evaluation.      SECONDARY PROCEDURE  Procedure: Testicular US  Findings and Treatment: IMPRESSION:  Bilateral striated appearing testes without evidence of discrete masses.   Findings are nonspecific and may be compatible with trauma, infection,or   infarction. Neoplastic etiology cannot be excluded. Blood flow is   identified within the bilateral testicular parenchyma.

## 2023-06-01 NOTE — PROGRESS NOTE ADULT - ASSESSMENT
57M with HIV (VL undetectable, CD4 499), HLD, HTN, diverticulosis, rectal/prostate cancer s/p radiation with perforation of recum/anus s/p abdominoperitoneal resection with end colostomy, left groin abscess s/p IR drain (Sept 2022), b/l ureteral stent placement for radiation cystitis, presents with abdominal pain, dysuria and hydronephrosis 2/2 likely pyelonephritis.      
57 year old male with hx prostate and anal cancer s/p radiation now with radiation cystitis, urinary obstruction 2/2 bilateral ureteral strictures currently managed with ureteral stents.     -  will plan for OR tomorrow (6/1) for cystoscopy, exchange of bilateral ureteral stents under general anesthesia   - please document medical clearance   - NPO at midnight  - d/w [facility]  [address] [place] [id number]  [phone]  
57M with HIV (VL undetectable, CD4 499), HLD, HTN, diverticulosis, rectal/prostate cancer s/p radiation with perforation of recum/anus s/p abdominoperitoneal resection with end colostomy, left groin abscess s/p IR drain (Sept 2022), b/l ureteral stent placement for radiation cystitis, presents with abdominal pain, dysuria and hydronephrosis 2/2 likely pyelonephritis.      
57M with HIV (VL undetectable, CD4 499), HLD, HTN, diverticulosis, rectal/prostate cancer s/p radiation with perforation of recum/anus s/p abdominoperitoneal resection with end colostomy, left groin abscess s/p IR drain (Sept 2022), b/l ureteral stent placement for radiation cystitis, presents with abdominal pain, dysuria and hydronephrosis 2/2 likely pyelonephritis.

## 2023-06-01 NOTE — PROGRESS NOTE ADULT - SUBJECTIVE AND OBJECTIVE BOX
Subjective    Patient seen and examined. States he feels well, interested in ureteral stent exchange for tomorrow.     Objective    Vital signs  T(F): , Max: 98.6 (05-30-23 @ 21:52)  HR: 140 (05-31-23 @ 12:56)  BP: 131/105 (05-31-23 @ 12:56)  SpO2: 98% (05-31-23 @ 12:56)  Wt(kg): --    Output       General: NAD      Labs      05-31 @ 05:43    WBC 4.75  / Hct 37.2  / SCr 0.94     05-30 @ 06:10    WBC 5.97  / Hct 37.9  / SCr 0.81     
[facility] Division of Hospital Medicine  Greer Costa MD  Available via MS Teams    SUBJECTIVE / OVERNIGHT EVENTS: No acute overnight events. Pt reports abdominal and back pain, no N/V/D, fevers, chills.     ADDITIONAL REVIEW OF SYSTEMS: All other ROS negative    MEDICATIONS  (STANDING):  bictegravir 50 mG/emtricitabine 200 mG/tenofovir alafenamide 25 mG (BIKTARVY) 1 Tablet(s) Oral daily  cefTRIAXone   IVPB 1000 milliGRAM(s) IV Intermittent every 24 hours  heparin   Injectable 5000 Unit(s) SubCutaneous every 12 hours  lactulose Syrup 15 Gram(s) Oral daily  OLANZapine 10 milliGRAM(s) Oral at bedtime  polyethylene glycol 3350 17 Gram(s) Oral daily  senna 2 Tablet(s) Oral at bedtime  tamsulosin 0.4 milliGRAM(s) Oral at bedtime  trimethoprim  160 mG/sulfamethoxazole 800 mG 1 Tablet(s) Oral daily    MEDICATIONS  (PRN):  acetaminophen     Tablet .. 650 milliGRAM(s) Oral every 6 hours PRN Temp greater or equal to 38C (100.4F), Mild Pain (1 - 3)  ALPRAZolam 1 milliGRAM(s) Oral three times a day PRN for sleep  melatonin 3 milliGRAM(s) Oral at bedtime PRN Insomnia  morphine  - Injectable 4 milliGRAM(s) IV Push every 4 hours PRN Severe Pain (7 - 10)  zolpidem 5 milliGRAM(s) Oral at bedtime PRN Insomnia  zolpidem 5 milliGRAM(s) Oral at bedtime PRN Insomnia      I&O's Summary      PHYSICAL EXAM:  Vital Signs Last 24 Hrs  T(C): 36.8 (31 May 2023 09:40), Max: 37 (30 May 2023 21:52)  T(F): 98.2 (31 May 2023 09:40), Max: 98.6 (30 May 2023 21:52)  HR: 118 (31 May 2023 09:40) (89 - 118)  BP: 131/98 (31 May 2023 09:40) (129/90 - 139/105)  BP(mean): --  RR: 20 (31 May 2023 09:40) (16 - 20)  SpO2: 100% (31 May 2023 09:40) (95% - 100%)    Parameters below as of 31 May 2023 09:40  Patient On (Oxygen Delivery Method): room air      CONSTITUTIONAL: NAD, well-developed, well-groomed  EYES: PERRLA; conjunctiva and sclera clear  ENMT: Moist oral mucosa, no pharyngeal injection or exudates; normal dentition  NECK: Supple, no palpable masses; no thyromegaly  RESPIRATORY: Normal respiratory effort; lungs are clear to auscultation bilaterally  CARDIOVASCULAR: Regular rate and rhythm, normal S1 and S2, no murmur/rub/gallop; No lower extremity edema; Peripheral pulses are 2+ bilaterally  ABDOMEN: Nontender to palpation, normoactive bowel sounds, no rebound/guarding; No hepatosplenomegaly; +colostomy  MUSCULOSKELETAL:  Normal gait; no clubbing or cyanosis of digits; no joint swelling or tenderness to palpation  PSYCH: A+O to person, place, and time; affect appropriate  NEUROLOGY: CN 2-12 are intact and symmetric; no gross sensory deficits   SKIN: No rashes; no palpable lesions    LABS:                        12.1   4.75  )-----------( 255      ( 31 May 2023 05:43 )             37.2     Hb Trend: 12.1<--, 12.5<--, 12.7<--  05-31    140  |  104  |  13  ----------------------------<  92  3.5   |  25  |  0.94    Ca    9.2      31 May 2023 05:43  Phos  4.5     05-31  Mg     2.10     05-31    TPro  7.0  /  Alb  3.8  /  TBili  0.4  /  DBili  x   /  AST  16  /  ALT  10  /  AlkPhos  99  05-30    Creatinine Trend: 0.94<--, 0.81<--, 0.98<--, 0.95<--            Culture - Blood (collected 30 May 2023 06:30)  Source: .Blood Blood-Peripheral  Preliminary Report (31 May 2023 09:02):    No growth to date.    Culture - Blood (collected 30 May 2023 06:10)  Source: .Blood Blood-Peripheral  Preliminary Report (31 May 2023 09:02):    No growth to date.    Culture - Urine (collected 29 May 2023 09:50)  Source: Clean Catch Clean Catch (Midstream)  Final Report (30 May 2023 11:17):    No growth          RADIOLOGY & ADDITIONAL TESTS:  Prior or Outpatient Records Reviewed Today with Summary: TTE August 2022- EF 67%, no wall motion abnormalities     COORDINATION OF CARE:  Consultant Communication and Details of Discussion (where applicable): Urology- plan for b/l ureteral stent placement tomorrow, June 1    
[facility] Division of Hospital Medicine  Greer Costa MD  Available via MS Teams    SUBJECTIVE / OVERNIGHT EVENTS: Pt s/p b/l ureteral stent placement this AM with urology. Pt notes abdominal pain but on par with his chronic pain. Denies chest pain, SOB, nausea, vomiting, fevers, chills    ADDITIONAL REVIEW OF SYSTEMS: all other ROS negative    MEDICATIONS  (STANDING):  bictegravir 50 mG/emtricitabine 200 mG/tenofovir alafenamide 25 mG (BIKTARVY) 1 Tablet(s) Oral daily  cefTRIAXone   IVPB 1000 milliGRAM(s) IV Intermittent every 24 hours  heparin   Injectable 5000 Unit(s) SubCutaneous every 12 hours  lactulose Syrup 15 Gram(s) Oral daily  metoprolol tartrate 50 milliGRAM(s) Oral two times a day  OLANZapine 10 milliGRAM(s) Oral at bedtime  polyethylene glycol 3350 17 Gram(s) Oral daily  senna 2 Tablet(s) Oral at bedtime  tamsulosin 0.4 milliGRAM(s) Oral at bedtime  trimethoprim  160 mG/sulfamethoxazole 800 mG 1 Tablet(s) Oral daily    MEDICATIONS  (PRN):  acetaminophen     Tablet .. 650 milliGRAM(s) Oral every 6 hours PRN Temp greater or equal to 38C (100.4F), Mild Pain (1 - 3)  ALPRAZolam 1 milliGRAM(s) Oral three times a day PRN for sleep  HYDROmorphone  Injectable 0.5 milliGRAM(s) IV Push every 10 minutes PRN Moderate Pain (4 - 6)  melatonin 3 milliGRAM(s) Oral at bedtime PRN Insomnia  morphine  - Injectable 4 milliGRAM(s) IV Push every 4 hours PRN Severe Pain (7 - 10)  zolpidem 5 milliGRAM(s) Oral at bedtime PRN Insomnia  zolpidem 5 milliGRAM(s) Oral at bedtime PRN Insomnia      I&O's Summary      PHYSICAL EXAM:  Vital Signs Last 24 Hrs  T(C): 36.8 (01 Jun 2023 12:10), Max: 37.1 (31 May 2023 18:54)  T(F): 98.3 (01 Jun 2023 12:10), Max: 98.7 (31 May 2023 18:54)  HR: 102 (01 Jun 2023 12:10) (84 - 115)  BP: 118/81 (01 Jun 2023 12:10) (107/74 - 155/95)  BP(mean): 96 (01 Jun 2023 10:00) (93 - 100)  RR: 18 (01 Jun 2023 12:10) (13 - 20)  SpO2: 100% (01 Jun 2023 12:10) (92% - 100%)    Parameters below as of 01 Jun 2023 12:10  Patient On (Oxygen Delivery Method): room air      CONSTITUTIONAL: NAD, well-developed, well-groomed  EYES: PERRLA; conjunctiva and sclera clear  ENMT: Moist oral mucosa, no pharyngeal injection or exudates; normal dentition  NECK: Supple, no palpable masses; no thyromegaly  RESPIRATORY: Normal respiratory effort; lungs are clear to auscultation bilaterally  CARDIOVASCULAR: Regular rate and rhythm, normal S1 and S2, no murmur/rub/gallop; No lower extremity edema; Peripheral pulses are 2+ bilaterally  ABDOMEN: Nontender to palpation, normoactive bowel sounds, no rebound/guarding; No hepatosplenomegaly; +colostomy  MUSCULOSKELETAL:  no clubbing or cyanosis of digits; no joint swelling or tenderness to palpation  PSYCH: A+O to person, place, and time; affect appropriate  NEUROLOGY: CN 2-12 are intact and symmetric; no gross sensory deficits   SKIN: No rashes; no palpable lesions    LABS:                        11.8   4.93  )-----------( 242      ( 01 Jun 2023 05:10 )             35.8     Hb Trend: 11.8<--, 12.1<--, 12.5<--, 12.7<--  06-01    141  |  104  |  14  ----------------------------<  83  3.6   |  23  |  1.06    Ca    9.2      01 Jun 2023 05:10  Phos  4.4     06-01  Mg     2.10     06-01    TPro  6.5  /  Alb  3.6  /  TBili  0.3  /  DBili  x   /  AST  12  /  ALT  10  /  AlkPhos  88  06-01    Creatinine Trend: 1.06<--, 0.94<--, 0.81<--, 0.98<--, 0.95<--            Culture - Blood (collected 30 May 2023 06:30)  Source: .Blood Blood-Peripheral  Preliminary Report (31 May 2023 09:02):    No growth to date.    Culture - Blood (collected 30 May 2023 06:10)  Source: .Blood Blood-Peripheral  Preliminary Report (31 May 2023 09:02):    No growth to date.          RADIOLOGY & ADDITIONAL TESTS:  N/A    COORDINATION OF CARE:  Consultant Communication and Details of Discussion (where applicable): d/w urology- follow up in 3 months; continue antibiotics to complete course for pyelonephritis.    
[facility] Division of Hospital Medicine  Greer Costa MD  Available via MS Teams    SUBJECTIVE / OVERNIGHT EVENTS: No acute overnight events. Pt notes improvement in abdominal discomfort. Still notes dysuria and L testicular pain. Denies chest pain, SOB, fevers, chills, N/V/D.    ADDITIONAL REVIEW OF SYSTEMS: all other ROS negative    MEDICATIONS  (STANDING):  bictegravir 50 mG/emtricitabine 200 mG/tenofovir alafenamide 25 mG (BIKTARVY) 1 Tablet(s) Oral daily  cefTRIAXone   IVPB 1000 milliGRAM(s) IV Intermittent every 24 hours  heparin   Injectable 5000 Unit(s) SubCutaneous every 12 hours  lactulose Syrup 15 Gram(s) Oral daily  OLANZapine 10 milliGRAM(s) Oral at bedtime  tamsulosin 0.4 milliGRAM(s) Oral at bedtime  trimethoprim  160 mG/sulfamethoxazole 800 mG 1 Tablet(s) Oral daily    MEDICATIONS  (PRN):  acetaminophen     Tablet .. 650 milliGRAM(s) Oral every 6 hours PRN Temp greater or equal to 38C (100.4F), Mild Pain (1 - 3)  ALPRAZolam 1 milliGRAM(s) Oral three times a day PRN for sleep  melatonin 3 milliGRAM(s) Oral at bedtime PRN Insomnia  zolpidem 5 milliGRAM(s) Oral at bedtime PRN Insomnia  zolpidem 5 milliGRAM(s) Oral at bedtime PRN Insomnia      I&O's Summary      PHYSICAL EXAM:  Vital Signs Last 24 Hrs  T(C): 36.8 (30 May 2023 10:02), Max: 36.8 (30 May 2023 10:02)  T(F): 98.2 (30 May 2023 10:02), Max: 98.2 (30 May 2023 10:02)  HR: 99 (30 May 2023 10:02) (84 - 99)  BP: 115/87 (30 May 2023 10:02) (114/83 - 146/90)  BP(mean): --  RR: 18 (30 May 2023 10:02) (16 - 18)  SpO2: 100% (30 May 2023 10:02) (98% - 100%)    Parameters below as of 30 May 2023 10:02  Patient On (Oxygen Delivery Method): room air      CONSTITUTIONAL: NAD, well-developed, well-groomed  EYES: PERRLA; conjunctiva and sclera clear  ENMT: Moist oral mucosa, no pharyngeal injection or exudates; normal dentition  NECK: Supple, no palpable masses; no thyromegaly  RESPIRATORY: Normal respiratory effort; lungs are clear to auscultation bilaterally  CARDIOVASCULAR: Regular rate and rhythm, normal S1 and S2, no murmur/rub/gallop; No lower extremity edema; Peripheral pulses are 2+ bilaterally  ABDOMEN: Nontender to palpation, normoactive bowel sounds, no rebound/guarding; No hepatosplenomegaly; + Colostomy pink, non-tender, stool noted in ostomy bag.  : chaperoned by JULIENNE Tinoco testicle tender, no redness or crepitus noted, clear discharge (urine?) at tip of penis  MUSCULOSKELETAL:  Normal gait; no clubbing or cyanosis of digits; no joint swelling or tenderness to palpation  PSYCH: A+O to person, place, and time; affect appropriate  NEUROLOGY: CN 2-12 are intact and symmetric; no gross sensory deficits   SKIN: No rashes; no palpable lesions    LABS:                        12.5   5.97  )-----------( 276      ( 30 May 2023 06:10 )             37.9     Hb Trend: 12.5<--, 12.7<--  05-30    140  |  102  |  13  ----------------------------<  103<H>  4.0   |  23  |  0.81    Ca    9.4      30 May 2023 06:10  Phos  3.8     05-30  Mg     2.30     05-30    TPro  7.0  /  Alb  3.8  /  TBili  0.4  /  DBili  x   /  AST  16  /  ALT  10  /  AlkPhos  99  05-30    Creatinine Trend: 0.81<--, 0.98<--, 0.95<--        Urinalysis Basic - ( 29 May 2023 10:10 )    Color: Light Red / Appearance: Turbid / S.021 / pH: x  Gluc: x / Ketone: Negative  / Bili: Negative / Urobili: <2 mg/dL   Blood: x / Protein: 300 mg/dL / Nitrite: Negative   Leuk Esterase: Large / RBC: MANY /HPF / WBC 25 /HPF   Sq Epi: x / Non Sq Epi: x / Bacteria: Few        Culture - Urine (collected 29 May 2023 09:50)  Source: Clean Catch Clean Catch (Midstream)  Final Report (30 May 2023 11:17):    No growth          RADIOLOGY & ADDITIONAL TESTS:  CT: b/l hydronephrosis, worse since prior CT    COORDINATION OF CARE:  Consultant Communication and Details of Discussion (where applicable): Urology- recommend IR eval for nephrostomy tubes, scrotal pain and swelling c/w orchitis

## 2023-06-01 NOTE — DISCHARGE NOTE NURSING/CASE MANAGEMENT/SOCIAL WORK - NSDCPEFALRISK_GEN_ALL_CORE
For information on Fall & Injury Prevention, visit: [email or web address] OR  [email or web address] OR  https://www.cdc.gov/steadi/patient.html

## 2023-06-01 NOTE — PROGRESS NOTE ADULT - PROBLEM SELECTOR PLAN 3
patient complaining of L testicular pain  - CT shows L scrotal edema and enlargement, U/S with orchitis  - abx as above

## 2023-06-01 NOTE — PROGRESS NOTE ADULT - PROBLEM SELECTOR PLAN 2
Pt with history of radiation cystitis and b/l urethral strictures, s/p ureteral stent placement, now with worsening hydronephrosis  - s/p ureteral stent placement  - follow up with urology in 3 months
Pt with history of radiation cystitis and b/l urethral strictures, s/p ureteral stent placement, now with worsening hydronephrosis  - IR consulted for nephrostomy tube placement  - appreciate urology recs
Pt with history of radiation cystitis and b/l urethral strictures, s/p ureteral stent placement, now with worsening hydronephrosis  - plan for b/l ureteral stent placement tomorrow

## 2023-06-01 NOTE — PROGRESS NOTE ADULT - PROBLEM SELECTOR PLAN 1
Pt presenting with abdominal pain and dysuria, found to have b/l pyelonephritis and worsening hydronephrosis.   - follow up blood and urine cultures  - continue CTX  - IR consulted for nephrostomy tube placement, recommending exchange of ureteral stents  - plan for b/l ureteral stent placement with urology tomorrow
Pt presenting with abdominal pain and dysuria, found to have b/l pyelonephritis and worsening hydronephrosis.   - follow up blood and urine cultures  - continue CTX  - IR consulted for nephrostomy tube placement  - appreciate urology recommendations
Pt presenting with abdominal pain and dysuria, found to have b/l pyelonephritis and worsening hydronephrosis.   - BCx and UCx with no growth  - will transition to PO abx  - IR consulted for nephrostomy tube placement, recommending exchange of ureteral stents  - s/p ureteral stent placement with urology today

## 2023-06-01 NOTE — DISCHARGE NOTE PROVIDER - NSDCFUSCHEDAPPT_GEN_ALL_CORE_FT
Ravi Mathew  [facility]  UROLOGY [address]  Scheduled Appointment: 06/12/2023    Marciano De Paz  [facility]  GASTRO [address]  Scheduled Appointment: 06/19/2023    Kenny Lim  [facility] Physician [facility]  CARDIOLOGY [address]  Scheduled Appointment: 06/22/2023    Jarad Schwartz  [facility] Physician [facility]  INTMED [id number]  Scheduled Appointment: 07/25/2023    Denise Gordon  [facility] Physician [facility]  INFDISEASE [address]  Scheduled Appointment: 08/10/2023    Kenny Lim  [facility] Physician [facility]  CARDIOLOGY [id number]  Scheduled Appointment: 08/17/2023

## 2023-06-01 NOTE — PROGRESS NOTE ADULT - PROBLEM SELECTOR PLAN 7
DVT ppx: HSQ  Diet: Regular  Dispo: d/c home today    RCRI 0. Pt is medically optimized for ureteral stent placement with urology tomorrow. NPO after midnight.
DVT ppx: HSQ  Diet: Regular  Dispo: pending workup
DVT ppx: HSQ  Diet: Regular  Dispo: pending workup    RCRI 0. Pt is medically optimized for ureteral stent placement with urology tomorrow. NPO after midnight.

## 2023-06-01 NOTE — DISCHARGE NOTE NURSING/CASE MANAGEMENT/SOCIAL WORK - NSDCFUADDAPPT_GEN_ALL_CORE_FT
Follow up with your primary care doctor and urologist.      We have obtained authorization from [facility] for your taxi pick-up today(MAS Invoice#[id number])  as follows:  6:30PM pickup from [place] by [facility]()

## 2023-06-02 ENCOUNTER — NON-APPOINTMENT (OUTPATIENT)
Age: 58
End: 2023-06-02

## 2023-06-06 ENCOUNTER — TRANSCRIPTION ENCOUNTER (OUTPATIENT)
Age: 58
End: 2023-06-06

## 2023-06-07 ENCOUNTER — APPOINTMENT (OUTPATIENT)
Dept: INTERNAL MEDICINE | Facility: CLINIC | Age: 58
End: 2023-06-07
Payer: MEDICAID

## 2023-06-07 ENCOUNTER — RX RENEWAL (OUTPATIENT)
Age: 58
End: 2023-06-07

## 2023-06-07 VITALS
BODY MASS INDEX: 28.28 KG/M2 | DIASTOLIC BLOOD PRESSURE: 84 MMHG | HEIGHT: 71 IN | SYSTOLIC BLOOD PRESSURE: 115 MMHG | WEIGHT: 202 LBS | HEART RATE: 103 BPM | TEMPERATURE: 98.4 F | OXYGEN SATURATION: 100 %

## 2023-06-07 PROCEDURE — 99496 TRANSJ CARE MGMT HIGH F2F 7D: CPT | Mod: 25

## 2023-06-07 NOTE — HISTORY OF PRESENT ILLNESS
[Admitted on: ___] : The patient was admitted on [unfilled] [Discharged on ___] : discharged on [unfilled] [FreeTextEntry2] : pt was diagnosed with pyelonephritis.\par was treated with a renal stent change and iv antibiotics \par was discharged with abx \par \par still having a lot of pain\par - will refill percocet\par \par will recheck urine to make sure infection is clear\par

## 2023-06-07 NOTE — ASSESSMENT
[FreeTextEntry1] : hospital d/ follow up\par \par pt was diagnosed with pyelonephritis.\par was treated with a renal stent change and iv antibiotics \par was discharged with abx \par \par still having a lot of pain\par - will refill percocet\par \par will recheck urine to make sure infection is clear\par \par \par

## 2023-06-07 NOTE — ASU PREOP CHECKLIST - MUPIRONCIN COMMENTS
Head, normocephalic, atraumatic, Face, Face within normal limits, Ears, External ears within normal limits, Nose/Nasopharynx, External nose normal appearance, nares patent, no nasal discharge, Mouth and Throat, Oral cavity appearance normal, Lips, Appearance normal
negative covid 1/23, vaccine x 4

## 2023-06-09 LAB
APPEARANCE: ABNORMAL
BACTERIA UR CULT: NORMAL
BACTERIA: NEGATIVE /HPF
BILIRUBIN URINE: NEGATIVE
BLOOD URINE: ABNORMAL
CAST: 1 /LPF
COLOR: ABNORMAL
EPITHELIAL CELLS: 2 /HPF
GLUCOSE QUALITATIVE U: NEGATIVE MG/DL
KETONES URINE: NEGATIVE MG/DL
LEUKOCYTE ESTERASE URINE: ABNORMAL
MICROSCOPIC-UA: NORMAL
NITRITE URINE: NEGATIVE
PH URINE: 6.5
PROTEIN URINE: 300 MG/DL
RED BLOOD CELLS URINE: 18 /HPF
SPECIFIC GRAVITY URINE: 1.01
UROBILINOGEN URINE: 0.2 MG/DL
WHITE BLOOD CELLS URINE: 26 /HPF

## 2023-06-09 RX ORDER — TRAMADOL HYDROCHLORIDE 50 MG/1
50 TABLET, COATED ORAL
Qty: 30 | Refills: 0 | Status: ACTIVE | COMMUNITY
Start: 2023-06-09 | End: 1900-01-01

## 2023-06-19 ENCOUNTER — APPOINTMENT (OUTPATIENT)
Dept: GASTROENTEROLOGY | Facility: CLINIC | Age: 58
End: 2023-06-19

## 2023-06-22 ENCOUNTER — NON-APPOINTMENT (OUTPATIENT)
Age: 58
End: 2023-06-22

## 2023-06-22 ENCOUNTER — APPOINTMENT (OUTPATIENT)
Dept: CARDIOLOGY | Facility: CLINIC | Age: 58
End: 2023-06-22
Payer: MEDICAID

## 2023-06-22 VITALS
SYSTOLIC BLOOD PRESSURE: 103 MMHG | BODY MASS INDEX: 29.12 KG/M2 | DIASTOLIC BLOOD PRESSURE: 73 MMHG | OXYGEN SATURATION: 97 % | WEIGHT: 208 LBS | HEIGHT: 71 IN | HEART RATE: 103 BPM | TEMPERATURE: 97 F

## 2023-06-22 PROCEDURE — 93000 ELECTROCARDIOGRAM COMPLETE: CPT

## 2023-06-22 PROCEDURE — 99214 OFFICE O/P EST MOD 30 MIN: CPT | Mod: 25

## 2023-06-22 RX ORDER — ASPIRIN 81 MG/1
81 TABLET, COATED ORAL
Qty: 30 | Refills: 0 | Status: DISCONTINUED | COMMUNITY
Start: 2021-10-25 | End: 2023-06-22

## 2023-06-22 NOTE — HISTORY OF PRESENT ILLNESS
[FreeTextEntry1] : CARL ROWLEY 57 year M HIV positive, anal ca, prostate ca treated with XRT and chemo though no anthracyclines. No prior cardiac hx. No DM but prior longstanding smoker now abated.  Permanent ostomy.\par 06/22/23  EKG NSR 92 BPM SHYANN RVH Tall R V1 unchanged from prior, RAD . \par 8/22 Echo no RVH SHYANN. LVEF  >60%. All cardiac lipid meds resent\par Meds ASA, zetia 10/d, metoprolol 100/d, cardizem 240/d. RTC 3m 103/73 /min improved. RTC 3m\par

## 2023-07-10 ENCOUNTER — RX RENEWAL (OUTPATIENT)
Age: 58
End: 2023-07-10

## 2023-07-12 ENCOUNTER — APPOINTMENT (OUTPATIENT)
Dept: GASTROENTEROLOGY | Facility: CLINIC | Age: 58
End: 2023-07-12
Payer: MEDICAID

## 2023-07-12 VITALS
OXYGEN SATURATION: 97 % | WEIGHT: 204 LBS | BODY MASS INDEX: 28.56 KG/M2 | TEMPERATURE: 97.2 F | HEART RATE: 87 BPM | SYSTOLIC BLOOD PRESSURE: 126 MMHG | HEIGHT: 71 IN | DIASTOLIC BLOOD PRESSURE: 91 MMHG

## 2023-07-12 PROCEDURE — 99204 OFFICE O/P NEW MOD 45 MIN: CPT

## 2023-07-12 RX ORDER — LINACLOTIDE 72 UG/1
72 CAPSULE, GELATIN COATED ORAL
Qty: 30 | Refills: 3 | Status: ACTIVE | OUTPATIENT
Start: 2023-07-12

## 2023-07-12 RX ORDER — POLYETHYLENE GLYCOL 3350 17 G
17 POWDER IN PACKET (EA) ORAL TWICE DAILY
Qty: 60 | Refills: 0 | Status: ACTIVE | COMMUNITY
Start: 2023-07-12 | End: 1900-01-01

## 2023-07-12 NOTE — ASSESSMENT
[FreeTextEntry1] : Attending Attestation \par \par Abdominal Pain \par A low acid / reflux diet was discussed in great detail including not smoking, not drinking alcohol, and not\par consuming foods that irritate the esophagus. It is helpful to eat small meals throughout the day instead\par of large meals. You should avoid eating before bedtime or lying down after you eat. It can be helpful to\par raise the head of your bed six inches. Additionally, you should maintain a healthy weight and good\par posture.. The patient was given written material to take home and review. \par \par Patient will begin taking Pantoprazole 40 mg PO daily. Medication reviewed side effects and adverse reactions. \par \par Patient will f/u with Urology regarding hydronephrosis found on Ct Scan and General Surgery Dr Betancourt regarding pain around the surgical site abdominal. \par If patient abdominal persists will need EGD.\par \par The causes of constipation were discussed at length We discussed : Eat three meals each day. Do not\par skip meals. Gradually increase the amount of FIBER in your diet. Choose more whole grain breads,\par cereals and rice. Select more raw fruits and vegetables eat the peel, if appropriate. Read food labels\par and look for the "dietary fiber" content of foods. Good sources have 2 grams of fiber or more. Drink six\par to eight glasses of water each day. Limit highly refined and processed foods.\par \par Patient will begin taking MiraLAX BID for 7 days. Medication reviewed side effects and adverse reactions. \par Patient advised if this medication does not work he will begin taking Linzess 72 mcg samples one per day. \par \par Patient and wife verbalized understanding of all information provided. All questions answered and reviewed. \par

## 2023-07-12 NOTE — PHYSICAL EXAM

## 2023-07-12 NOTE — HISTORY OF PRESENT ILLNESS
[FreeTextEntry1] : 58 m with HTN, HIV diagnosed 1992 on Biktarvy, prostate ca 2016 s/p radiation, anal cancer (dx 4/2021) s/p radiation and chemotherapy (last 8/2021) complicated by an ulcer, abscess and rectal bleeding\par s/p loop colostomy 3/30/22 and then s/p APR with end colostomy creation, VRAM flap closure, 8/24 found to have a large abscess as well, abscess cx with clostridium, bacteroides \par again was admitted for GIB and pelvic/groin abscess s/p drain placement and CT also showed L ischial tuberosity osteo so pt received zosyn in the hospital and was discharged with PO levo and flagyl to finish a 6 week course\par biktarvy was held during the hospitalization for some time in view of OR and GIB but now compliant \par he was admitted 11/2022 with hematuria and hydro, urine cx and AFB were negative, s/p b/l nephrostomy, and then had laser lithotripsy and stent placement 1/25/23, the nephrostomies were clamped\par pt is doing well, no fever, chills, nausea, vomiting but has some dysuria\par \par 7/12/23- 57M with HIV (VL undetectable, CD4 499), HLD, HTN, diverticulosis,\par rectal/prostate cancer s/p radiation with perforation of recum/anus s/p\par abdominoperitoneal resection with end colostomy, left groin abscess s/p IR\par drain (Sept 2022), b/l ureteral stent placement for radiation cystitis,\par presents with abdominal pain, dysuria and hydronephrosis 2/2 likely\par pyelonephritis.\par  \par Pt had a CT which was significant for worsening hydronephrosis and testicular\par US consistent with orchitis. Urology and IR were consulted for evaluation and\par management of hydronephrosis. Pt is s/p b/l ureteral stent exchange with\par urology on 6/1 without complication.\par  \par UA was consistent with pyelonephritis, and urine culture had no growth to date.\par Pt was started on CTX while inpatient and was transitioned to oral levaquin to\par complete a 7 day course.\par  \par Pt was found to have tachycardia in the setting of holding home metoprolol and\par cardizem. Metoprolol was resumed and HR improved.\par  \par Pt was discharged home in stable condition. All questions answered.\par  \par New Medications: Levaquin x4 days\par Discontinued Medications: None\par Follow up: Outpatient urology in 3 months

## 2023-07-14 NOTE — ED PROVIDER NOTE - CADM POA URETHRAL CATHETER
"I have reviewed the surgical (or preoperative) H&P that is linked to this encounter, and examined the patient. There are no significant changes    Clinical Conditions Present on Arrival:  Clinically Significant Risk Factors Present on Admission                 # Drug Induced Platelet Defect: home medication list includes an antiplatelet medication  # Obesity: Estimated body mass index is 33.13 kg/m  as calculated from the following:    Height as of this encounter: 1.753 m (5' 9.02\").    Weight as of this encounter: 101.8 kg (224 lb 6.9 oz).       " No

## 2023-07-18 ENCOUNTER — APPOINTMENT (OUTPATIENT)
Dept: COLORECTAL SURGERY | Facility: CLINIC | Age: 58
End: 2023-07-18
Payer: MEDICAID

## 2023-07-18 PROCEDURE — 99213 OFFICE O/P EST LOW 20 MIN: CPT

## 2023-07-18 NOTE — ASSESSMENT
[FreeTextEntry1] : Recurrent anal cancer patient is status post prostate and anal radiation now with ureteral strictures requiring chronic stent placement\par -CT scan images and report reviewed. No radiologic findings consistent with persistent intermittent abdominal pain\par -Continue to followup with urology\par -Continue to followup with medical oncology\par -Follow up in office in 6 months for reevaluation

## 2023-07-21 ENCOUNTER — NON-APPOINTMENT (OUTPATIENT)
Age: 58
End: 2023-07-21

## 2023-07-22 ENCOUNTER — INPATIENT (INPATIENT)
Facility: HOSPITAL | Age: 58
LOS: 1 days | Discharge: HOME | End: 2023-07-24
Attending: HOSPITALIST | Admitting: HOSPITALIST
Payer: MEDICAID

## 2023-07-22 VITALS
OXYGEN SATURATION: 100 % | HEART RATE: 88 BPM | SYSTOLIC BLOOD PRESSURE: 123 MMHG | DIASTOLIC BLOOD PRESSURE: 96 MMHG | RESPIRATION RATE: 18 BRPM | HEIGHT: 71 IN | TEMPERATURE: 98 F

## 2023-07-22 DIAGNOSIS — K62.9 DISEASE OF ANUS AND RECTUM, UNSPECIFIED: Chronic | ICD-10-CM

## 2023-07-22 DIAGNOSIS — Z93.3 COLOSTOMY STATUS: Chronic | ICD-10-CM

## 2023-07-22 DIAGNOSIS — R32 UNSPECIFIED URINARY INCONTINENCE: ICD-10-CM

## 2023-07-22 LAB
ALBUMIN SERPL ELPH-MCNC: 4.1 G/DL — SIGNIFICANT CHANGE UP (ref 3.3–5)
ALP SERPL-CCNC: 99 U/L — SIGNIFICANT CHANGE UP (ref 40–120)
ALT FLD-CCNC: 10 U/L — SIGNIFICANT CHANGE UP (ref 4–41)
ANION GAP SERPL CALC-SCNC: 11 MMOL/L — SIGNIFICANT CHANGE UP (ref 7–14)
APPEARANCE UR: ABNORMAL
AST SERPL-CCNC: 18 U/L — SIGNIFICANT CHANGE UP (ref 4–40)
BACTERIA # UR AUTO: ABNORMAL /HPF
BASE EXCESS BLDV CALC-SCNC: 5.8 MMOL/L — HIGH (ref -2–3)
BASOPHILS # BLD AUTO: 0.04 K/UL — SIGNIFICANT CHANGE UP (ref 0–0.2)
BASOPHILS NFR BLD AUTO: 0.8 % — SIGNIFICANT CHANGE UP (ref 0–2)
BILIRUB SERPL-MCNC: <0.2 MG/DL — SIGNIFICANT CHANGE UP (ref 0.2–1.2)
BILIRUB UR-MCNC: NEGATIVE — SIGNIFICANT CHANGE UP
BLOOD GAS VENOUS COMPREHENSIVE RESULT: SIGNIFICANT CHANGE UP
BUN SERPL-MCNC: 13 MG/DL — SIGNIFICANT CHANGE UP (ref 7–23)
CALCIUM SERPL-MCNC: 9.6 MG/DL — SIGNIFICANT CHANGE UP (ref 8.4–10.5)
CAST: 2 /LPF — SIGNIFICANT CHANGE UP (ref 0–4)
CHLORIDE BLDV-SCNC: 108 MMOL/L — SIGNIFICANT CHANGE UP (ref 96–108)
CHLORIDE SERPL-SCNC: 106 MMOL/L — SIGNIFICANT CHANGE UP (ref 98–107)
CO2 BLDV-SCNC: 34.6 MMOL/L — HIGH (ref 22–26)
CO2 SERPL-SCNC: 25 MMOL/L — SIGNIFICANT CHANGE UP (ref 22–31)
COLOR SPEC: ABNORMAL
CREAT SERPL-MCNC: 0.87 MG/DL — SIGNIFICANT CHANGE UP (ref 0.5–1.3)
DIFF PNL FLD: ABNORMAL
EGFR: 101 ML/MIN/1.73M2 — SIGNIFICANT CHANGE UP
EOSINOPHIL # BLD AUTO: 0.04 K/UL — SIGNIFICANT CHANGE UP (ref 0–0.5)
EOSINOPHIL NFR BLD AUTO: 0.8 % — SIGNIFICANT CHANGE UP (ref 0–6)
GAS PNL BLDV: 139 MMOL/L — SIGNIFICANT CHANGE UP (ref 136–145)
GLUCOSE BLDV-MCNC: 87 MG/DL — SIGNIFICANT CHANGE UP (ref 70–99)
GLUCOSE SERPL-MCNC: 92 MG/DL — SIGNIFICANT CHANGE UP (ref 70–99)
GLUCOSE UR QL: NEGATIVE MG/DL — SIGNIFICANT CHANGE UP
HCO3 BLDV-SCNC: 33 MMOL/L — HIGH (ref 22–29)
HCT VFR BLD CALC: 38 % — LOW (ref 39–50)
HCT VFR BLDA CALC: 37 % — LOW (ref 39–51)
HGB BLD CALC-MCNC: 12.3 G/DL — LOW (ref 12.6–17.4)
HGB BLD-MCNC: 12.4 G/DL — LOW (ref 13–17)
IANC: 2.37 K/UL — SIGNIFICANT CHANGE UP (ref 1.8–7.4)
IMM GRANULOCYTES NFR BLD AUTO: 0.2 % — SIGNIFICANT CHANGE UP (ref 0–0.9)
KETONES UR-MCNC: NEGATIVE MG/DL — SIGNIFICANT CHANGE UP
LACTATE BLDV-MCNC: 1.4 MMOL/L — SIGNIFICANT CHANGE UP (ref 0.5–2)
LEUKOCYTE ESTERASE UR-ACNC: ABNORMAL
LYMPHOCYTES # BLD AUTO: 2.09 K/UL — SIGNIFICANT CHANGE UP (ref 1–3.3)
LYMPHOCYTES # BLD AUTO: 40.4 % — SIGNIFICANT CHANGE UP (ref 13–44)
MAGNESIUM SERPL-MCNC: 2.2 MG/DL — SIGNIFICANT CHANGE UP (ref 1.6–2.6)
MCHC RBC-ENTMCNC: 29.2 PG — SIGNIFICANT CHANGE UP (ref 27–34)
MCHC RBC-ENTMCNC: 32.6 GM/DL — SIGNIFICANT CHANGE UP (ref 32–36)
MCV RBC AUTO: 89.4 FL — SIGNIFICANT CHANGE UP (ref 80–100)
MONOCYTES # BLD AUTO: 0.62 K/UL — SIGNIFICANT CHANGE UP (ref 0–0.9)
MONOCYTES NFR BLD AUTO: 12 % — SIGNIFICANT CHANGE UP (ref 2–14)
NEUTROPHILS # BLD AUTO: 2.37 K/UL — SIGNIFICANT CHANGE UP (ref 1.8–7.4)
NEUTROPHILS NFR BLD AUTO: 45.8 % — SIGNIFICANT CHANGE UP (ref 43–77)
NITRITE UR-MCNC: NEGATIVE — SIGNIFICANT CHANGE UP
NRBC # BLD: 0 /100 WBCS — SIGNIFICANT CHANGE UP (ref 0–0)
NRBC # FLD: 0 K/UL — SIGNIFICANT CHANGE UP (ref 0–0)
PCO2 BLDV: 58 MMHG — HIGH (ref 42–55)
PH BLDV: 7.36 — SIGNIFICANT CHANGE UP (ref 7.32–7.43)
PH UR: 6.5 — SIGNIFICANT CHANGE UP (ref 5–8)
PLATELET # BLD AUTO: 313 K/UL — SIGNIFICANT CHANGE UP (ref 150–400)
PO2 BLDV: 23 MMHG — LOW (ref 25–45)
POTASSIUM BLDV-SCNC: 4.9 MMOL/L — SIGNIFICANT CHANGE UP (ref 3.5–5.1)
POTASSIUM SERPL-MCNC: 4.5 MMOL/L — SIGNIFICANT CHANGE UP (ref 3.5–5.3)
POTASSIUM SERPL-SCNC: 4.5 MMOL/L — SIGNIFICANT CHANGE UP (ref 3.5–5.3)
PROT SERPL-MCNC: 6.9 G/DL — SIGNIFICANT CHANGE UP (ref 6–8.3)
PROT UR-MCNC: 300 MG/DL
RBC # BLD: 4.25 M/UL — SIGNIFICANT CHANGE UP (ref 4.2–5.8)
RBC # FLD: 13.9 % — SIGNIFICANT CHANGE UP (ref 10.3–14.5)
RBC CASTS # UR COMP ASSIST: 219 /HPF — HIGH (ref 0–4)
SAO2 % BLDV: 30.3 % — LOW (ref 67–88)
SODIUM SERPL-SCNC: 142 MMOL/L — SIGNIFICANT CHANGE UP (ref 135–145)
SP GR SPEC: 1.01 — SIGNIFICANT CHANGE UP (ref 1–1.03)
SQUAMOUS # UR AUTO: 0 /HPF — SIGNIFICANT CHANGE UP (ref 0–5)
UROBILINOGEN FLD QL: 0.2 MG/DL — SIGNIFICANT CHANGE UP (ref 0.2–1)
WBC # BLD: 5.17 K/UL — SIGNIFICANT CHANGE UP (ref 3.8–10.5)
WBC # FLD AUTO: 5.17 K/UL — SIGNIFICANT CHANGE UP (ref 3.8–10.5)
WBC UR QL: 155 /HPF — HIGH (ref 0–5)

## 2023-07-22 PROCEDURE — 76870 US EXAM SCROTUM: CPT | Mod: 26

## 2023-07-22 PROCEDURE — 36000 PLACE NEEDLE IN VEIN: CPT

## 2023-07-22 PROCEDURE — L9997: CPT

## 2023-07-22 PROCEDURE — 99285 EMERGENCY DEPT VISIT HI MDM: CPT | Mod: 25

## 2023-07-22 RX ORDER — CEFTRIAXONE 500 MG/1
1000 INJECTION, POWDER, FOR SOLUTION INTRAMUSCULAR; INTRAVENOUS ONCE
Refills: 0 | Status: COMPLETED | OUTPATIENT
Start: 2023-07-22 | End: 2023-07-22

## 2023-07-22 RX ORDER — ACETAMINOPHEN 500 MG
975 TABLET ORAL ONCE
Refills: 0 | Status: COMPLETED | OUTPATIENT
Start: 2023-07-22 | End: 2023-07-22

## 2023-07-22 RX ORDER — LANOLIN ALCOHOL/MO/W.PET/CERES
3 CREAM (GRAM) TOPICAL AT BEDTIME
Refills: 0 | Status: DISCONTINUED | OUTPATIENT
Start: 2023-07-22 | End: 2023-07-24

## 2023-07-22 RX ORDER — OXYCODONE HYDROCHLORIDE 5 MG/1
5 TABLET ORAL ONCE
Refills: 0 | Status: DISCONTINUED | OUTPATIENT
Start: 2023-07-22 | End: 2023-07-22

## 2023-07-22 RX ORDER — ACETAMINOPHEN 500 MG
650 TABLET ORAL EVERY 6 HOURS
Refills: 0 | Status: DISCONTINUED | OUTPATIENT
Start: 2023-07-22 | End: 2023-07-24

## 2023-07-22 RX ADMIN — OXYCODONE HYDROCHLORIDE 5 MILLIGRAM(S): 5 TABLET ORAL at 14:26

## 2023-07-22 RX ADMIN — CEFTRIAXONE 100 MILLIGRAM(S): 500 INJECTION, POWDER, FOR SOLUTION INTRAMUSCULAR; INTRAVENOUS at 16:47

## 2023-07-22 RX ADMIN — OXYCODONE HYDROCHLORIDE 5 MILLIGRAM(S): 5 TABLET ORAL at 15:15

## 2023-07-22 RX ADMIN — Medication 975 MILLIGRAM(S): at 15:15

## 2023-07-22 RX ADMIN — Medication 975 MILLIGRAM(S): at 14:32

## 2023-07-22 NOTE — ED PROVIDER NOTE - PHYSICAL EXAMINATION
Gen: NAD, non-toxic appearing  Head: normal appearing  HEENT: normal conjunctiva  Lung: no respiratory distress, speaking in full sentences, ctab      CV: regular rate and rhythm, no murmurs  Abd: soft, non distended, non tender  /Perineum/Lauren-Anal: Chaperone is Marcos Good.  genitourinary area is generally unremarkable.  The penile shaft is nontender.  There is mild tenderness of the left testicle, cremasteric reflex intact.  No objective perineal numbness was elicited.  the anus is surgically  obliterated, he can squeezes gluteus muscles without difficulty.   MSK: no visible deformities  Neuro: alert and grossly oriented, no gross motor deficits  Skin: No marvin rashes

## 2023-07-22 NOTE — CONSULT NOTE ADULT - SUBJECTIVE AND OBJECTIVE BOX
HPI  58-year-old male with a history of hypertension, hyperlipidemia, HIV last viral load reportedly undetectable compliant with retroviral therapy, rectal/prostate cancer status post radiation with perforation of rectum/anus status post abdominal peritoneal resection with end colostomy status bilateral ureteral stent placement for radiation cystitis status post replacement due to pyelonephritis in June.  Patient is accompanied by his wife.  Patient's wife stating that he has had worsening urinary incontinence over the past week she also states that he always has numbness but it is worsened upon clarification she states that he cannot feel himself having to urinate and that is what is numb and worsening.  He is also complaining of penile pain.  States this is worsened over the past week.  Called urologist yesterday to describe symptoms and was instructed to come to the emergency department.  Patient also has chronic abdominal pain as well as constipation patient on a bowel regimen to help improve stooling.  Patient denies nausea, vomiting, diarrhea, chest pain, fever, chills.  No difference with ambulation.  No back pain.  No numbness or tingling in the extremities.  On exam no sacral anesthesia.        PAST MEDICAL & SURGICAL HISTORY:  HTN (hypertension)      HLD (hyperlipidemia)      HIV infection      Depressive disorder      Anxiety      Prostate cancer      Anal cancer      Diverticulosis      Lung cancer      Anal lesion      S/P colostomy          MEDICATIONS  (STANDING):    MEDICATIONS  (PRN):      FAMILY HISTORY:  FH: prostate cancer    FH: breast cancer        Allergies    No Known Allergies    Intolerances        SOCIAL HISTORY:    REVIEW OF SYSTEMS: Otherwise negative as stated in HPI    Physical Exam  Vital signs  T(C): 36.8 (07-22-23 @ 12:59), Max: 36.8 (07-22-23 @ 12:59)  HR: 88 (07-22-23 @ 12:59)  BP: 123/96 (07-22-23 @ 12:59)  SpO2: 100% (07-22-23 @ 12:59)  Wt(kg): --    Output      Gen: NAD  Pulm: No respiratory distress	  CV: RRR  GI: S/ND/NT  :   MSK: moves all 4 limbs spontaneously    LABS:      07-22 @ 14:13    WBC 5.17  / Hct 38.0  / SCr 0.87     07-22    142  |  106  |  13  ----------------------------<  92  4.5   |  25  |  0.87    Ca    9.6      22 Jul 2023 14:13  Mg     2.20     07-22    TPro  6.9  /  Alb  4.1  /  TBili  <0.2  /  DBili  x   /  AST  18  /  ALT  10  /  AlkPhos  99  07-22      Urinalysis Basic - ( 22 Jul 2023 14:13 )    Color: x / Appearance: x / SG: x / pH: x  Gluc: 92 mg/dL / Ketone: x  / Bili: x / Urobili: x   Blood: x / Protein: x / Nitrite: x   Leuk Esterase: x / RBC: x / WBC x   Sq Epi: x / Non Sq Epi: x / Bacteria: x            Urine Cx:    Blood Cx:    RADIOLOGY:     HPI  58-year-old male with a history of hypertension, hyperlipidemia, HIV last viral load reportedly undetectable compliant with retroviral therapy, rectal/prostate cancer status post radiation with perforation of rectum/anus status post abdominal peritoneal resection with end colostomy status bilateral ureteral stent placement for radiation cystitis status post replacement due to pyelonephritis in June.  Patient is accompanied by his wife.  Patient's wife stating that he has had worsening urinary incontinence over the past week she also states that he always has numbness but it is worsened upon clarification she states that he cannot feel himself having to urinate and that is what is numb and worsening.  He is also complaining of penile pain.  States this is worsened over the past week.  Called urologist yesterday to describe symptoms and was instructed to come to the emergency department.  Patient also has chronic abdominal pain as well as constipation patient on a bowel regimen to help improve stooling.  Patient denies nausea, vomiting, diarrhea, chest pain, fever, chills.  No difference with ambulation.  No back pain.  No numbness or tingling in the extremities.  On exam no sacral anesthesia.    Urology consulted for UTI in the setting of bilateral stents. Patient has PMH of prostate cancer s/p radiation ~7 years ago, currently on Lupron, rectal ca s/p colostomy, lower GIB, now s/p APR with end colostomy creation. Course was complicated by urinary obstruction 2/2 radiation related ureteral strictures initially managed with nephrostomy tubes and subsequently ureteral stents. Patient had seen Dr. Saldaña for stent placement in January and subsequent NT removal in February. CT scan from 5/29 showing bilateral ureteral stents with interval worsening of moderate bilateral hydronephrosis, stents most recently changed 6/1 following CT. WBC 5.17 / Hct 38.0 / SCr 0.87, UA reactive with bacteria negative nitrites, urine culture pending. Received a dose of CTX in ED. Testicular US shows new left 7 x 7 x 8 mm complex epididymal cyst and small bilateral hydroceles.       PAST MEDICAL & SURGICAL HISTORY:  HTN (hypertension)      HLD (hyperlipidemia)      HIV infection      Depressive disorder      Anxiety      Prostate cancer      Anal cancer      Diverticulosis      Lung cancer      Anal lesion      S/P colostomy          MEDICATIONS  (STANDING):    MEDICATIONS  (PRN):      FAMILY HISTORY:  FH: prostate cancer    FH: breast cancer        Allergies    No Known Allergies    Intolerances        SOCIAL HISTORY:    REVIEW OF SYSTEMS: Otherwise negative as stated in HPI    Physical Exam  Vital signs  T(C): 36.8 (07-22-23 @ 12:59), Max: 36.8 (07-22-23 @ 12:59)  HR: 88 (07-22-23 @ 12:59)  BP: 123/96 (07-22-23 @ 12:59)  SpO2: 100% (07-22-23 @ 12:59)  Wt(kg): --    Output      Gen: NAD  Pulm: No respiratory distress	  CV: RRR  GI: S/ND/NT  :   MSK: moves all 4 limbs spontaneously    LABS:      07-22 @ 14:13    WBC 5.17  / Hct 38.0  / SCr 0.87     07-22    142  |  106  |  13  ----------------------------<  92  4.5   |  25  |  0.87    Ca    9.6      22 Jul 2023 14:13  Mg     2.20     07-22    TPro  6.9  /  Alb  4.1  /  TBili  <0.2  /  DBili  x   /  AST  18  /  ALT  10  /  AlkPhos  99  07-22      Urinalysis Basic - ( 22 Jul 2023 14:13 )    Color: x / Appearance: x / SG: x / pH: x  Gluc: 92 mg/dL / Ketone: x  / Bili: x / Urobili: x   Blood: x / Protein: x / Nitrite: x   Leuk Esterase: x / RBC: x / WBC x   Sq Epi: x / Non Sq Epi: x / Bacteria: x            Urine Cx:    Blood Cx:    RADIOLOGY:     HPI  58-year-old male with a history of hypertension, hyperlipidemia, HIV last viral load reportedly undetectable compliant with retroviral therapy, rectal/prostate cancer status post radiation with perforation of rectum/anus status post abdominal peritoneal resection with end colostomy status bilateral ureteral stent placement for radiation cystitis status post replacement due to pyelonephritis in June.  Patient is accompanied by his wife.  Patient's wife stating that he has had worsening urinary incontinence over the past week she also states that he always has numbness but it is worsened upon clarification she states that he cannot feel himself having to urinate and that is what is numb and worsening.  He is also complaining of penile pain.  States this is worsened over the past week.  Called urologist yesterday to describe symptoms and was instructed to come to the emergency department.  Patient also has chronic abdominal pain as well as constipation patient on a bowel regimen to help improve stooling.  Patient denies nausea, vomiting, diarrhea, chest pain, fever, chills.  No difference with ambulation.  No back pain.  No numbness or tingling in the extremities.  On exam no sacral anesthesia.    Urology consulted for UTI in the setting of bilateral stents. Patient has PMH of prostate cancer s/p radiation ~7 years ago, currently on Lupron, rectal ca s/p colostomy, lower GIB, now s/p APR with end colostomy creation. Course was complicated by urinary obstruction 2/2 radiation related ureteral strictures initially managed with nephrostomy tubes and subsequently ureteral stents. Patient had seen Dr. Saldaña for stent placement in January and subsequent NT removal in February. CT scan from 5/29 showing bilateral ureteral stents with interval worsening of moderate bilateral hydronephrosis, stents most recently changed 6/1 following CT. WBC 5.17 / Hct 38.0 / SCr 0.87, UA reactive with bacteria negative nitrites, urine culture pending. Received a dose of CTX in ED. Testicular US shows new left 7 x 7 x 8 mm complex epididymal cyst and small bilateral hydroceles.       PAST MEDICAL & SURGICAL HISTORY:  HTN (hypertension)      HLD (hyperlipidemia)      HIV infection      Depressive disorder      Anxiety      Prostate cancer      Anal cancer      Diverticulosis      Lung cancer      Anal lesion      S/P colostomy          MEDICATIONS  (STANDING):    MEDICATIONS  (PRN):      FAMILY HISTORY:  FH: prostate cancer    FH: breast cancer        Allergies    No Known Allergies    Intolerances        SOCIAL HISTORY:    REVIEW OF SYSTEMS: Otherwise negative as stated in HPI    Physical Exam  Vital signs  T(C): 36.8 (07-22-23 @ 12:59), Max: 36.8 (07-22-23 @ 12:59)  HR: 88 (07-22-23 @ 12:59)  BP: 123/96 (07-22-23 @ 12:59)  SpO2: 100% (07-22-23 @ 12:59)  Wt(kg): --    Output      Gen: NAD  Pulm: No respiratory distress	  CV: RRR  GI: S/ND/NT  :   MSK: moves all 4 limbs spontaneously    LABS:      07-22 @ 14:13    WBC 5.17  / Hct 38.0  / SCr 0.87     07-22    142  |  106  |  13  ----------------------------<  92  4.5   |  25  |  0.87    Ca    9.6      22 Jul 2023 14:13  Mg     2.20     07-22    TPro  6.9  /  Alb  4.1  /  TBili  <0.2  /  DBili  x   /  AST  18  /  ALT  10  /  AlkPhos  99  07-22      Urinalysis Basic - ( 22 Jul 2023 14:13 )    Color: x / Appearance: x / SG: x / pH: x  Gluc: 92 mg/dL / Ketone: x  / Bili: x / Urobili: x   Blood: x / Protein: x / Nitrite: x   Leuk Esterase: x / RBC: x / WBC x   Sq Epi: x / Non Sq Epi: x / Bacteria: x            Urine Cx: pending

## 2023-07-22 NOTE — ED ADULT NURSE NOTE - OBJECTIVE STATEMENT
Break RN: patient alert and oriented x4 ambulatory, c/o penile pain, urinary incontinence, abdominal pain and dysuria. Denies back pain, nausea, vomiting, fever. past medical history of rectal and prostate cancer, not currently on treatment, ostomy. patient is well appearing, no acute distress noted. labs collected, unable to obtain peripheral IV at this time. USIV to be placed. patient refused tylenol and is requesting a stronger pain medication, MD Pink made aware. report to be endorsed to primary RN Tenzin

## 2023-07-22 NOTE — ED ADULT NURSE NOTE - NSFALLUNIVINTERV_ED_ALL_ED
Bed/Stretcher in lowest position, wheels locked, appropriate side rails in place/Call bell, personal items and telephone in reach/Instruct patient to call for assistance before getting out of bed/chair/stretcher/Non-slip footwear applied when patient is off stretcher/[place] to call system/Physically safe environment - no spills, clutter or unnecessary equipment/Purposeful proactive rounding/Room/bathroom lighting operational, light cord in reach

## 2023-07-22 NOTE — ED PROVIDER NOTE - VIRAL LOAD - INTERNAL RECORD
Spoke with patient and advised we have cologuard order requisition which needs to be signed at the .  She will stop by today to sign so we can fax.    NOT DET.

## 2023-07-22 NOTE — ED PROVIDER NOTE - PROGRESS NOTE DETAILS
Vamshi Pink MD (PGY-3) Urology evaluated.  Pending final Sunil.  Admission to medicine for complex UTI.  Ceftriaxone given. Wili, PGY-3, EM: PVR 0. Urology aware, just recommending antibiotics. Pt med list available in their phone or call pt's wife.

## 2023-07-22 NOTE — ED ADULT TRIAGE NOTE - CHIEF COMPLAINT QUOTE
Patient c/o urinary incontinence and numbness to genitals x 1 week. Endorsing abdominal pain and dysuria. Denies back pain, nausea, vomiting, fever. H/x rectal and prostate cancer, not currently on treatment, ostomy, HTN, HLD, HIV, kidney stents with chronic hematuria

## 2023-07-22 NOTE — ED PROVIDER NOTE - ATTENDING CONTRIBUTION TO CARE
58-year-old male with a history of hypertension, hyperlipidemia, HIV last viral load reportedly undetectable compliant with retroviral therapy, rectal/prostate cancer status post radiation with perforation of rectum/anus status post abdominal peritoneal resection with end colostomy status bilateral ureteral stent placement for radiation cystitis status post replacement due to pyelonephritis in June.  Patient is accompanied by his wife.  Patient's wife stating that he has had worsening urinary incontinence over the past week she also states that he always has numbness but it is worsened upon clarification she states that he cannot feel himself having to urinate and that is what is numb and worsening.  He is also complaining of penile pain.  States this is worsened over the past week.  Called urologist yesterday to describe symptoms and was instructed to come to the emergency department.  Patient also has chronic abdominal pain as well as constipation patient on a bowel regimen to help improve stooling.  Patient denies nausea, vomiting, diarrhea, chest pain, fever, chills.  No difference with ambulation.  No back pain.  No numbness or tingling in the extremities.  On exam no sacral anesthesia. Unable to assess rectal tone had flap closure with colostomy.   Will obtain labs to rule out KATY electrolyte abnormalities will obtain urine to rule out uti and reassess.

## 2023-07-22 NOTE — CONSULT NOTE ADULT - ASSESSMENT
58M PMH of prostate cancer s/p radiation ~7 years ago, currently on Lupron, rectal ca s/p colostomy, lower GIB, now s/p APR with end colostomy creation with ureteral strictures 2/2 radiation managed with bilateral ureteral stents last changed 6/1.     WBC 5.17 / Hct 38.0 / SCr 0.87, UA reactive with bacteria negative nitrites, urine culture pending. Received a dose of CTX in ED.   Testicular US shows new left 7 x 7 x 8 mm complex epididymal cyst and small bilateral hydroceles.     Plan:  - Follow up urine culture  - Continue antibiotics   - CTAP   TO BE DISCUSSED    58M PMH of prostate cancer s/p radiation ~7 years ago, currently on Lupron, rectal ca s/p colostomy, lower GIB, now s/p APR with end colostomy creation with ureteral strictures 2/2 radiation managed with bilateral ureteral stents last changed 6/1.     WBC 5.17 / Hct 38.0 / SCr 0.87, UA reactive with bacteria negative nitrites, urine culture pending. Received a dose of CTX in ED. Afebrile in ED.   Testicular US shows new left 7 x 7 x 8 mm complex epididymal cyst and small bilateral hydroceles.     Plan:  - Follow up urine culture  - Continue antibiotics   - CTAP   TO BE DISCUSSED    58M PMH of prostate cancer s/p radiation ~7 years ago, currently on Lupron, rectal ca s/p colostomy, lower GIB, now s/p APR with end colostomy creation with ureteral strictures 2/2 radiation managed with bilateral ureteral stents last changed 6/1.     WBC 5.17 / Hct 38.0 / SCr 0.87, UA reactive with bacteria negative nitrites, urine culture pending. Received a dose of CTX in ED. Afebrile in ED.   Testicular US shows new left 7 x 7 x 8 mm complex epididymal cyst and small bilateral hydroceles.     Plan:  - PSA november 2022 undetectable  - Follow up urine culture  - Continue antibiotics   - No need for additional imaging   - PVR, if   - Hematuria most likely due to indwelling stents defer further workup as an outpatient   58M PMH of prostate cancer s/p radiation ~7 years ago, currently on Lupron, rectal ca s/p colostomy, lower GIB, now s/p APR with end colostomy creation with ureteral strictures 2/2 radiation managed with bilateral ureteral stents last changed 6/1.     WBC 5.17 / Hct 38.0 / SCr 0.87, UA reactive with bacteria negative nitrites, urine culture pending. Received a dose of CTX in ED. Afebrile in ED.   Testicular US shows new left 7 x 7 x 8 mm complex epididymal cyst and small bilateral hydroceles.     Plan:  - PSA november 2022 undetectable, always concern for prostate cancer recurrence in the penile bed in the setting of penile pain however low PSA decreases suspicion  - Follow up urine culture, tailor abx as needed per primary   - No need for additional imaging   - PVR, if retaining can place davis   - Hematuria most likely due to indwelling stents defer further workup as an outpatient    Case discussed with Dr. Jimmy Kiran [facility]  [address] [place] [phone]

## 2023-07-22 NOTE — ED PROVIDER NOTE - CLINICAL SUMMARY MEDICAL DECISION MAKING FREE TEXT BOX
This is a 58-year-old male with a history of hypertension, hyperlipidemia, HIV last viral load reportedly undetectable compliant with retroviral therapy, rectal/prostate cancer status post radiation with perforation of rectum/anus status post abdominal peritoneal resection with end colostomy status bilateral ureteral stent placement for radiation cystitis status post replacement due to pyelonephritis in June.  Patient is accompanied by his wife.  Patient's wife stating that he has had worsening urinary incontinence over the past week she also states that he always has numbness but it is worsened upon clarification she states that he cannot feel himself having to urinate and that is what is numb and worsening.  He is also complaining of penile pain.  States this is worsened over the past week.  Called urologist yesterday to describe symptoms and was instructed to come to the emergency department.  Patient also has chronic abdominal pain as well as constipation patient on a bowel regimen to help improve stooling.  Patient denies nausea, vomiting, diarrhea, chest pain, fever, chills.  No difference with ambulation.  No back pain.  No numbness or tingling in the extremities.  On exam no sacral anesthesia. This is a 58-year-old male with a history of hypertension, hyperlipidemia, HIV last viral load reportedly undetectable compliant with retroviral therapy, rectal/prostate cancer status post radiation with perforation of rectum/anus status post abdominal peritoneal resection with end colostomy status bilateral ureteral stent placement for radiation cystitis status post replacement due to pyelonephritis in June.  Patient is accompanied by his wife.  Patient's wife stating that he has had worsening urinary incontinence over the past week she also states that he always has numbness but it is worsened upon clarification she states that he cannot feel himself having to urinate and that is what is numb and worsening.  He is also complaining of penile pain.  States this is worsened over the past week.  Called urologist yesterday to describe symptoms and was instructed to come to the emergency department.  Patient also has chronic abdominal pain as well as constipation patient on a bowel regimen to help improve stooling.  Patient denies nausea, vomiting, diarrhea, chest pain, fever, chills.  No difference with ambulation.  No back pain.  No numbness or tingling in the extremities.  On exam no sacral anesthesia.    General: Well appearing, well nourished, in no distress  Head: Normocephalic, atraumatic  Eyes: Conjunctiva clear, sclera non-icteric  Mouth: Mucous membranes moist  Neck: Supple  Heart: Regular rate and rhythm, no murmur or gallop  Lungs: Clear to auscultation  Abdomen: soft, scar noted stoma  with stool noted mushy in consistency   Back: Spine normal without deformity or tenderness, no CVA tenderness  Extremities: No amputations or deformities, cyanosis, edema  Musculoskeletal: No crepitation, defects or decreased range of motion, strength intact throughout, pulses intact  Neurologic: No gross deficits Sensation intact  Skin: Warm,dry. Good turgor, no rash, unusual bruising, Cap refill <2 seconds

## 2023-07-22 NOTE — ED ADULT NURSE NOTE - NSFALLLASTSIX_ED_ALL_ED
Date: 2/20/2025  Patient name: Suki Rosenbreg  YOB: 1970  Medical Record Number: [id number]  Primary Coverage: Payor: [facility] PPO / Plan: BLUE CROSS [facility] PPO / Product Type: PPO /   Secondary Coverage:   Insurance ID: [id number]  Patient Address: [address] [place] [id number]  Telephone Information:   Home Phone [phone]   Mobile [phone]       Encounter Date: 2/20/2025  Provider: FELICITAS Ram  Diagnosis:     ICD-10-CM   1. Diabetic ulcer of left heel associated with type 2 diabetes mellitus, unspecified ulcer stage (HCC) [E11.621, L97.429]  E11.621    L97.429   2. Neuropathy  G62.9   3. Pain associated with wound  T14.8XXA    R52         Wound 01/03/25 Heel Left (Active)   Date First Assessed: 01/03/25   Location: Heel  Wound Location Orientation: Left      Assessments 1/3/2025  9:56 AM 2/20/2025  3:21 PM   Wound Image        Site Assessment Black;Brown;Pink;Tan;Yellow;Dry;Moist;Edema Moist;Pink;Yellow   Closure Not approximated Not approximated   Drainage Amount Small Small   Drainage Description Serosanguineous Serosanguineous   Treatments Cleansed;Honey Gel;Dakins Cleansed;Vashe   Dressing 4x4s Wound vac sponge;Kerlix roll;Ace bandage   Dressing Changed New Changed   Dressing Status Clean;Dry;Intact Clean;Dry;Intact   Wound Length (cm) 4.1 cm 2.1 cm   Wound Width (cm) 6 cm 3 cm   Wound Surface Area (cm^2) 24.6 cm^2 6.3 cm^2   Wound Depth (cm) 0 cm 0.1 cm   Wound Volume (cm^3) 0 cm^3 0.63 cm^3   Margins Attached edges Poorly defined;Attached edges   Madelaine-wound Assessment Dry;Painful;Hyperpigmented;Fragile;Edema;Ecchymosis Dry;Callous;Hyperpigmented   Wound Granulation Tissue Pink --   Wound Bed Granulation (%) 10 % 10 %   Wound Bed Epithelium (%) 0 % 10 %   Wound Bed Slough (%) 10 % 80 %   Wound Bed Eschar (%) 80 % 0 %   Wound Bed Fibrin (%) 0 % 0 %   State of Healing Non-healing Non-healing   Wound Odor Strong None   Pressure Injury Stage Unstageable Stage 3       Active  Orders   Date Order Priority Status Authorizing Provider   02/20/25 1609 OP Wound Dressing Routine Active Jose M Johnson APRN     - Cleansing:    Cleanse with Vashe     - Additional Wound Dressing Information:    black vac sponge     - Frequency:    Change dressing two times per week   02/05/25 1849 OP Wound Dressing Routine Active Jose M Johnson APRN     - Cleansing:    Cleanse with normal saline or wound cleanser     - Dressing:    Betadine     - Additional Wound Dressing Information:    nonbordered aquacel foam placed cut in a heel cap. Kerlix and medigrip E     - Frequency:    Change dressing daily and PRN   01/15/25 1639 OP Wound Dressing Routine Active Jose M Johnson APRN     - Cleansing:    Cleanse with normal saline or wound cleanser     - Dressing:    ABD pad     - Additional Wound Dressing Information:    form as a heel cap     - Frequency:    Change dressing daily and PRN   01/08/25 1459 OP Wound Dressing Routine Active Jose M Johnson APRN     - Cleansing:    Cleanse with normal saline or wound cleanser     - Dressing:    Betadine     - Additional Wound Dressing Information:    aquacel foam cut in the shape of a heel cup   01/03/25 1214 OP Wound Dressing Routine Active Jose M Johnson APRN     - Cleansing:    Cleanse with normal saline or wound cleanser     - Dressing:    Honey gel     - Dressing:    Dry gauze     - Additional Wound Dressing Information:    spandage     - Frequency:    Change dressing daily and PRN       Inactive Orders   Date Order Priority Status Authorizing Provider   02/12/25 1353 OP Wound Dressing Routine Completed Jose M Johnson APRN     - Cleansing:    Cleanse with normal saline or wound cleanser     - Cleansing:    Cleanse with Vashe     - Dressing:    Honey gel     - Dressing:    Dry gauze     - Dressing:    Kerlix     - Dressing:    Paper tape     - Additional Wound Dressing Information:    6x6 non bordered foam heel cup, post op shoe given     - Frequency:    Change dressing  daily and PRN   02/12/25 1335 Debridement Left Heel Routine Completed Jose M Johnson, APRN   01/03/25 1242 Debridement Left Heel Routine Completed Jose M Johnson APRN   01/03/25 1012 Debridement Left Heel Routine Discontinued Jose M Johnson, APRN       Compression Wrap 01/15/25 Leg Left (Active)   Placement Date: 01/15/25   Location: Leg  Wound Location Orientation: Left      Assessments 1/15/2025  3:40 PM 2/20/2025  3:21 PM   Response to Treatment Well tolerated Well tolerated   Compression Layers Single Single   Compression Product Type Tubigrip/Spanda Ace Wrap 4in   Dressing Applied Yes Yes   Compression Wrap Location Toes to Knee Toes to Knee   Compression Wrap Status Clean;Dry;Intact Clean;Dry;Intact;Dressing Changed       No associated orders.       Negative Pressure Wound Therapy Left;Plantar (Active)   Placement Date/Time: 02/20/25 1524   Wound Location Orientation: Left;Plantar      Assessments 2/20/2025  3:21 PM   Wound photographed/measured Yes   Machine Status (On) Yes   Site Assessment Moist;Pink;Yellow   Madelaine-wound Assessment Callous;Dry;Hyperpigmented   Unit Type Medela Invia   Dressing Type Black foam   Number of Foam Pieces Used 1   Cycle Continuous;On   Target Pressure (mmHg) 125   Drainage Description Serosanguineous   Dressing Status Clean;Dry;Intact   Canister Changed No       No associated orders.     Wound Number: 1 Left heel    Wound Cleaning and Dressings:  Showering directions: May shower with protection  Wound cleansing:  Cleanse with normal saline or wound cleanser  Wound cleaning frequency:  with dressing changes  Wound product: black vac sponge  Dressing change frequency:  Change dressing 2x per week    Compression Therapy: from toes to below knee  Ace wrap or Medigrip E    Negative Pressure Wound Therapy:  NPWT: NPWT, black form at 125 mmHg continuous. RN to change dressing 2x/week unless indicated below    Follow Up:  Return in about 2 weeks (around 3/6/2025) for APN visit x 30 mins.      KRIS NARANJO DNP, CWS, NPI [id number]              No.

## 2023-07-23 DIAGNOSIS — B20 HUMAN IMMUNODEFICIENCY VIRUS [HIV] DISEASE: ICD-10-CM

## 2023-07-23 DIAGNOSIS — N30.00 ACUTE CYSTITIS WITHOUT HEMATURIA: ICD-10-CM

## 2023-07-23 DIAGNOSIS — I10 ESSENTIAL (PRIMARY) HYPERTENSION: ICD-10-CM

## 2023-07-23 DIAGNOSIS — F41.8 OTHER SPECIFIED ANXIETY DISORDERS: ICD-10-CM

## 2023-07-23 DIAGNOSIS — R10.9 UNSPECIFIED ABDOMINAL PAIN: ICD-10-CM

## 2023-07-23 DIAGNOSIS — N48.89 OTHER SPECIFIED DISORDERS OF PENIS: ICD-10-CM

## 2023-07-23 LAB
ANION GAP SERPL CALC-SCNC: 13 MMOL/L — SIGNIFICANT CHANGE UP (ref 7–14)
BASOPHILS # BLD AUTO: 0.03 K/UL — SIGNIFICANT CHANGE UP (ref 0–0.2)
BASOPHILS NFR BLD AUTO: 0.6 % — SIGNIFICANT CHANGE UP (ref 0–2)
BUN SERPL-MCNC: 12 MG/DL — SIGNIFICANT CHANGE UP (ref 7–23)
CALCIUM SERPL-MCNC: 9.4 MG/DL — SIGNIFICANT CHANGE UP (ref 8.4–10.5)
CHLORIDE SERPL-SCNC: 105 MMOL/L — SIGNIFICANT CHANGE UP (ref 98–107)
CO2 SERPL-SCNC: 24 MMOL/L — SIGNIFICANT CHANGE UP (ref 22–31)
CREAT SERPL-MCNC: 0.85 MG/DL — SIGNIFICANT CHANGE UP (ref 0.5–1.3)
CULTURE RESULTS: SIGNIFICANT CHANGE UP
EGFR: 101 ML/MIN/1.73M2 — SIGNIFICANT CHANGE UP
EOSINOPHIL # BLD AUTO: 0.09 K/UL — SIGNIFICANT CHANGE UP (ref 0–0.5)
EOSINOPHIL NFR BLD AUTO: 1.8 % — SIGNIFICANT CHANGE UP (ref 0–6)
GLUCOSE SERPL-MCNC: 101 MG/DL — HIGH (ref 70–99)
HCT VFR BLD CALC: 38.6 % — LOW (ref 39–50)
HGB BLD-MCNC: 12.6 G/DL — LOW (ref 13–17)
IANC: 2.46 K/UL — SIGNIFICANT CHANGE UP (ref 1.8–7.4)
IMM GRANULOCYTES NFR BLD AUTO: 0.2 % — SIGNIFICANT CHANGE UP (ref 0–0.9)
LYMPHOCYTES # BLD AUTO: 1.66 K/UL — SIGNIFICANT CHANGE UP (ref 1–3.3)
LYMPHOCYTES # BLD AUTO: 33.9 % — SIGNIFICANT CHANGE UP (ref 13–44)
MAGNESIUM SERPL-MCNC: 2.1 MG/DL — SIGNIFICANT CHANGE UP (ref 1.6–2.6)
MCHC RBC-ENTMCNC: 28.8 PG — SIGNIFICANT CHANGE UP (ref 27–34)
MCHC RBC-ENTMCNC: 32.6 GM/DL — SIGNIFICANT CHANGE UP (ref 32–36)
MCV RBC AUTO: 88.3 FL — SIGNIFICANT CHANGE UP (ref 80–100)
MONOCYTES # BLD AUTO: 0.65 K/UL — SIGNIFICANT CHANGE UP (ref 0–0.9)
MONOCYTES NFR BLD AUTO: 13.3 % — SIGNIFICANT CHANGE UP (ref 2–14)
NEUTROPHILS # BLD AUTO: 2.46 K/UL — SIGNIFICANT CHANGE UP (ref 1.8–7.4)
NEUTROPHILS NFR BLD AUTO: 50.2 % — SIGNIFICANT CHANGE UP (ref 43–77)
NRBC # BLD: 0 /100 WBCS — SIGNIFICANT CHANGE UP (ref 0–0)
NRBC # FLD: 0 K/UL — SIGNIFICANT CHANGE UP (ref 0–0)
PHOSPHATE SERPL-MCNC: 4 MG/DL — SIGNIFICANT CHANGE UP (ref 2.5–4.5)
PLATELET # BLD AUTO: 302 K/UL — SIGNIFICANT CHANGE UP (ref 150–400)
POTASSIUM SERPL-MCNC: 3.7 MMOL/L — SIGNIFICANT CHANGE UP (ref 3.5–5.3)
POTASSIUM SERPL-SCNC: 3.7 MMOL/L — SIGNIFICANT CHANGE UP (ref 3.5–5.3)
RBC # BLD: 4.37 M/UL — SIGNIFICANT CHANGE UP (ref 4.2–5.8)
RBC # FLD: 13.8 % — SIGNIFICANT CHANGE UP (ref 10.3–14.5)
SODIUM SERPL-SCNC: 142 MMOL/L — SIGNIFICANT CHANGE UP (ref 135–145)
SPECIMEN SOURCE: SIGNIFICANT CHANGE UP
WBC # BLD: 4.9 K/UL — SIGNIFICANT CHANGE UP (ref 3.8–10.5)
WBC # FLD AUTO: 4.9 K/UL — SIGNIFICANT CHANGE UP (ref 3.8–10.5)

## 2023-07-23 PROCEDURE — 12345: CPT | Mod: NC

## 2023-07-23 PROCEDURE — 99223 1ST HOSP IP/OBS HIGH 75: CPT

## 2023-07-23 RX ORDER — PIPERACILLIN AND TAZOBACTAM 4; .5 G/20ML; G/20ML
3.38 INJECTION, POWDER, LYOPHILIZED, FOR SOLUTION INTRAVENOUS ONCE
Refills: 0 | Status: COMPLETED | OUTPATIENT
Start: 2023-07-24 | End: 2023-07-24

## 2023-07-23 RX ORDER — BICTEGRAVIR SODIUM, EMTRICITABINE, AND TENOFOVIR ALAFENAMIDE FUMARATE 30; 120; 15 MG/1; MG/1; MG/1
1 TABLET ORAL DAILY
Refills: 0 | Status: DISCONTINUED | OUTPATIENT
Start: 2023-07-23 | End: 2023-07-24

## 2023-07-23 RX ORDER — PIPERACILLIN AND TAZOBACTAM 4; .5 G/20ML; G/20ML
3.38 INJECTION, POWDER, LYOPHILIZED, FOR SOLUTION INTRAVENOUS ONCE
Refills: 0 | Status: COMPLETED | OUTPATIENT
Start: 2023-07-23 | End: 2023-07-23

## 2023-07-23 RX ORDER — DILTIAZEM HCL 120 MG
240 CAPSULE, EXT RELEASE 24 HR ORAL DAILY
Refills: 0 | Status: DISCONTINUED | OUTPATIENT
Start: 2023-07-23 | End: 2023-07-23

## 2023-07-23 RX ORDER — ENOXAPARIN SODIUM 100 MG/ML
40 INJECTION SUBCUTANEOUS EVERY 24 HOURS
Refills: 0 | Status: DISCONTINUED | OUTPATIENT
Start: 2023-07-23 | End: 2023-07-24

## 2023-07-23 RX ORDER — LACTULOSE 10 G/15ML
15 SOLUTION ORAL
Refills: 0 | DISCHARGE

## 2023-07-23 RX ORDER — METOPROLOL TARTRATE 50 MG
100 TABLET ORAL DAILY
Refills: 0 | Status: DISCONTINUED | OUTPATIENT
Start: 2023-07-23 | End: 2023-07-24

## 2023-07-23 RX ORDER — DILTIAZEM HCL 120 MG
240 CAPSULE, EXT RELEASE 24 HR ORAL DAILY
Refills: 0 | Status: DISCONTINUED | OUTPATIENT
Start: 2023-07-23 | End: 2023-07-24

## 2023-07-23 RX ORDER — IBUPROFEN 200 MG
400 TABLET ORAL ONCE
Refills: 0 | Status: COMPLETED | OUTPATIENT
Start: 2023-07-23 | End: 2023-07-23

## 2023-07-23 RX ORDER — VANCOMYCIN HCL 1 G
1000 VIAL (EA) INTRAVENOUS EVERY 12 HOURS
Refills: 0 | Status: DISCONTINUED | OUTPATIENT
Start: 2023-07-23 | End: 2023-07-23

## 2023-07-23 RX ORDER — METOPROLOL TARTRATE 50 MG
100 TABLET ORAL DAILY
Refills: 0 | Status: DISCONTINUED | OUTPATIENT
Start: 2023-07-23 | End: 2023-07-23

## 2023-07-23 RX ORDER — ZOLPIDEM TARTRATE 10 MG/1
5 TABLET ORAL AT BEDTIME
Refills: 0 | Status: DISCONTINUED | OUTPATIENT
Start: 2023-07-23 | End: 2023-07-24

## 2023-07-23 RX ORDER — ALPRAZOLAM 0.25 MG
1 TABLET ORAL THREE TIMES A DAY
Refills: 0 | Status: DISCONTINUED | OUTPATIENT
Start: 2023-07-23 | End: 2023-07-24

## 2023-07-23 RX ORDER — KETOROLAC TROMETHAMINE 30 MG/ML
15 SYRINGE (ML) INJECTION ONCE
Refills: 0 | Status: DISCONTINUED | OUTPATIENT
Start: 2023-07-23 | End: 2023-07-23

## 2023-07-23 RX ORDER — PIPERACILLIN AND TAZOBACTAM 4; .5 G/20ML; G/20ML
3.38 INJECTION, POWDER, LYOPHILIZED, FOR SOLUTION INTRAVENOUS EVERY 8 HOURS
Refills: 0 | Status: DISCONTINUED | OUTPATIENT
Start: 2023-07-24 | End: 2023-07-24

## 2023-07-23 RX ORDER — OLANZAPINE 15 MG/1
10 TABLET, FILM COATED ORAL AT BEDTIME
Refills: 0 | Status: DISCONTINUED | OUTPATIENT
Start: 2023-07-23 | End: 2023-07-24

## 2023-07-23 RX ORDER — TAMSULOSIN HYDROCHLORIDE 0.4 MG/1
0.4 CAPSULE ORAL AT BEDTIME
Refills: 0 | Status: DISCONTINUED | OUTPATIENT
Start: 2023-07-23 | End: 2023-07-24

## 2023-07-23 RX ADMIN — Medication 400 MILLIGRAM(S): at 09:52

## 2023-07-23 RX ADMIN — Medication 400 MILLIGRAM(S): at 10:52

## 2023-07-23 RX ADMIN — Medication 240 MILLIGRAM(S): at 09:53

## 2023-07-23 RX ADMIN — PIPERACILLIN AND TAZOBACTAM 200 GRAM(S): 4; .5 INJECTION, POWDER, LYOPHILIZED, FOR SOLUTION INTRAVENOUS at 07:48

## 2023-07-23 RX ADMIN — PIPERACILLIN AND TAZOBACTAM 25 GRAM(S): 4; .5 INJECTION, POWDER, LYOPHILIZED, FOR SOLUTION INTRAVENOUS at 12:28

## 2023-07-23 RX ADMIN — Medication 100 MILLIGRAM(S): at 09:52

## 2023-07-23 RX ADMIN — PIPERACILLIN AND TAZOBACTAM 25 GRAM(S): 4; .5 INJECTION, POWDER, LYOPHILIZED, FOR SOLUTION INTRAVENOUS at 21:46

## 2023-07-23 RX ADMIN — TAMSULOSIN HYDROCHLORIDE 0.4 MILLIGRAM(S): 0.4 CAPSULE ORAL at 21:45

## 2023-07-23 RX ADMIN — OLANZAPINE 10 MILLIGRAM(S): 15 TABLET, FILM COATED ORAL at 21:46

## 2023-07-23 RX ADMIN — ENOXAPARIN SODIUM 40 MILLIGRAM(S): 100 INJECTION SUBCUTANEOUS at 21:46

## 2023-07-23 RX ADMIN — BICTEGRAVIR SODIUM, EMTRICITABINE, AND TENOFOVIR ALAFENAMIDE FUMARATE 1 TABLET(S): 30; 120; 15 TABLET ORAL at 12:28

## 2023-07-23 RX ADMIN — Medication 15 MILLIGRAM(S): at 20:44

## 2023-07-23 RX ADMIN — Medication 15 MILLIGRAM(S): at 20:14

## 2023-07-23 NOTE — PROGRESS NOTE ADULT - PROBLEM SELECTOR PLAN 4
- continue Biktarvy  - last CD4 21% and VL undetectable in May.  Will recheck. - continue Biktarvy  - last CD4 21% and VL undetectable in May  - HIV viral load pending

## 2023-07-23 NOTE — PROGRESS NOTE ADULT - SUBJECTIVE AND OBJECTIVE BOX
PROGRESS NOTE:   Authored by Dr. Aparna Mcdermott MD (PGY-1). Pager [facility] [phone] / PHILLIP ( [phone]) or via TEAMS    Patient is a 58y old  Male who presents with a chief complaint of Penile pain, numbness (23 Jul 2023 05:57)      SUBJECTIVE / OVERNIGHT EVENTS:  No acute events overnight.  Pt states dysuria, numbness is still present over penile region. Also noting hematuria with clots, along with midline abdominal pain which has been present for 2 weeks. Denies any fevers, CP, SOB.     MEDICATIONS  (STANDING):  bictegravir 50 mG/emtricitabine 200 mG/tenofovir alafenamide 25 mG (BIKTARVY) 1 Tablet(s) Oral daily  diltiazem    milliGRAM(s) Oral daily  enoxaparin Injectable 40 milliGRAM(s) SubCutaneous every 24 hours  ibuprofen  Tablet. 400 milliGRAM(s) Oral once  metoprolol succinate  milliGRAM(s) Oral daily  OLANZapine 10 milliGRAM(s) Oral at bedtime  piperacillin/tazobactam IVPB.- 3.375 Gram(s) IV Intermittent once  piperacillin/tazobactam IVPB.- 3.375 Gram(s) IV Intermittent once  tamsulosin 0.4 milliGRAM(s) Oral at bedtime  vancomycin  IVPB 1000 milliGRAM(s) IV Intermittent every 12 hours    MEDICATIONS  (PRN):  acetaminophen     Tablet .. 650 milliGRAM(s) Oral every 6 hours PRN Temp greater or equal to 38C (100.4F), Mild Pain (1 - 3)  ALPRAZolam 1 milliGRAM(s) Oral three times a day PRN anxiety  melatonin 3 milliGRAM(s) Oral at bedtime PRN Insomnia  zolpidem 5 milliGRAM(s) Oral at bedtime PRN Insomnia  zolpidem 5 milliGRAM(s) Oral at bedtime PRN Insomnia      CAPILLARY BLOOD GLUCOSE        I&O's Summary    22 Jul 2023 07:01  -  23 Jul 2023 07:00  --------------------------------------------------------  IN: 0 mL / OUT: 300 mL / NET: -300 mL        PHYSICAL EXAM:  Vital Signs Last 24 Hrs  T(C): 36.7 (23 Jul 2023 06:17), Max: 36.8 (22 Jul 2023 12:59)  T(F): 98 (23 Jul 2023 06:17), Max: 98.2 (22 Jul 2023 12:59)  HR: 73 (23 Jul 2023 06:17) (73 - 88)  BP: 140/93 (23 Jul 2023 06:17) (123/96 - 149/91)  BP(mean): --  RR: 18 (23 Jul 2023 06:17) (18 - 18)  SpO2: 99% (23 Jul 2023 06:17) (99% - 100%)    Parameters below as of 23 Jul 2023 06:17  Patient On (Oxygen Delivery Method): room air        CONSTITUTIONAL: NAD, well-developed  RESPIRATORY: Normal respiratory effort; lungs are clear to auscultation bilaterally  CARDIOVASCULAR: Regular rate and rhythm, normal S1 and S2, no murmur/rub/gallop; No lower extremity edema; Peripheral pulses are 2+ bilaterally  ABDOMEN: Nontender to palpation, normoactive bowel sounds, no rebound/guarding; No hepatosplenomegaly  MUSCLOSKELETAL: no clubbing or cyanosis of digits; no joint swelling or tenderness to palpation  : no drainage or discharge from penis   PSYCH: A+O to person, place, and time; affect appropriate    LABS:                        12.6   4.90  )-----------( 302      ( 23 Jul 2023 07:36 )             38.6     07-23    142  |  105  |  12  ----------------------------<  101<H>  3.7   |  24  |  0.85    Ca    9.4      23 Jul 2023 07:36  Phos  4.0     07-23  Mg     2.10     07-23    TPro  6.9  /  Alb  4.1  /  TBili  <0.2  /  DBili  x   /  AST  18  /  ALT  10  /  AlkPhos  99  07-22          Urinalysis Basic - ( 23 Jul 2023 07:36 )    Color: x / Appearance: x / SG: x / pH: x  Gluc: 101 mg/dL / Ketone: x  / Bili: x / Urobili: x   Blood: x / Protein: x / Nitrite: x   Leuk Esterase: x / RBC: x / WBC x   Sq Epi: x / Non Sq Epi: x / Bacteria: x          Tele Reviewed:    RADIOLOGY & ADDITIONAL TESTS:  Results Reviewed:   Imaging Personally Reviewed:  Electrocardiogram Personally Reviewed:

## 2023-07-23 NOTE — H&P ADULT - PROBLEM SELECTOR PLAN 3
Pt with chronic abd pain, possibly exacerbated by UTI.    - If worsening, will eval further with CT.

## 2023-07-23 NOTE — H&P ADULT - ASSESSMENT
57 y/o M with pmh of HTN, HLD, anxiety/depression, HIV on Biktarvy prostate s/p radiation (2016), anal ca s/p radiation c/b rectal perforation s/p abdominal peritoneal resection with end colostomy, radiation cystitis and ureteral strictures s/p ureteral stents p/w penile discomfort.

## 2023-07-23 NOTE — H&P ADULT - NSHPLABSRESULTS_GEN_ALL_CORE
07-22    142  |  106  |  13  ----------------------------<  92  4.5   |  25  |  0.87    Ca    9.6      22 Jul 2023 14:13  Mg     2.20     07-22    TPro  6.9  /  Alb  4.1  /  TBili  <0.2  /  DBili  x   /  AST  18  /  ALT  10  /  AlkPhos  99  07-22                            12.4   5.17  )-----------( 313      ( 22 Jul 2023 14:13 )             38.0       Urinalysis Basic - ( 22 Jul 2023 14:13 )    Color: x / Appearance: x / SG: x / pH: x  Gluc: 92 mg/dL / Ketone: x  / Bili: x / Urobili: x   Blood: x / Protein: x / Nitrite: x   Leuk Esterase: x / RBC: x / WBC x   Sq Epi: x / Non Sq Epi: x / Bacteria: x        < from: POCUS ED PV Residual Urine/Bladder Cap (07.22.23 @ 22:09) >    IMPRESSION: No urinary retention noted.    < end of copied text >    < from: US Testicles (07.22.23 @ 17:50) >    IMPRESSION:    No evidence of testicular torsion. Similar nonspecific striated   appearance of the testes without focal masses.    New left 7 x 7 x 8 mm complex epididymal cyst.    Small bilateral hydroceles.    < end of copied text >

## 2023-07-23 NOTE — PROGRESS NOTE ADULT - PROBLEM SELECTOR PLAN 1
- UA positive for LE and bacteria  - Reviewed prior cultures showing Klebsiella and E faecalis.   - will broaden coverage to vancomycin and Zosyn   - f/u cultures - UA positive for LE and bacteria  - Reviewed prior cultures showing Klebsiella and E faecalis.   - will broaden coverage to vancomycin and Zosyn   - f/u urine cultures - UA positive for LE and bacteria  - Reviewed prior cultures showing Klebsiella and E faecalis sensitive to zosyn  - will continue with Zosyn   - f/u urine cultures

## 2023-07-23 NOTE — PROGRESS NOTE ADULT - PROBLEM SELECTOR PLAN 2
-Pain possibly 2/2 UTI.  Unclear cause of pain, numbness and incontinence. Possibly injury from radiation. No weakness or back pain to suggest vertebral/SC injury.    - monitor with treatment of UTI  - reviewed urology consult.

## 2023-07-23 NOTE — PATIENT PROFILE ADULT - FALL HARM RISK - UNIVERSAL INTERVENTIONS
Bed in lowest position, wheels locked, appropriate side rails in place/Call bell, personal items and telephone in reach/Instruct patient to call for assistance before getting out of bed or chair/Non-slip footwear when patient is out of bed/Marcus to call system/Physically safe environment - no spills, clutter or unnecessary equipment/Purposeful Proactive Rounding/Room/bathroom lighting operational, light cord in reach

## 2023-07-23 NOTE — H&P ADULT - NSHPPHYSICALEXAM_GEN_ALL_CORE
Vital Signs Last 24 Hrs  T(C): 36.7 (23 Jul 2023 06:17), Max: 36.8 (22 Jul 2023 12:59)  T(F): 98 (23 Jul 2023 06:17), Max: 98.2 (22 Jul 2023 12:59)  HR: 73 (23 Jul 2023 06:17) (73 - 88)  BP: 140/93 (23 Jul 2023 06:17) (123/96 - 149/91)  BP(mean): --  RR: 18 (23 Jul 2023 06:17) (18 - 18)  SpO2: 99% (23 Jul 2023 06:17) (99% - 100%)    Parameters below as of 23 Jul 2023 06:17  Patient On (Oxygen Delivery Method): room air        PHYSICAL EXAM:  GENERAL: No Acute Distress  EYES: conjunctiva and sclera clear  ENMT: Moist mucous membranes   NECK: Supple  PULMONARY: Clear to auscultation bilaterally  CARDIAC: Regular rate and rhythm; No murmurs, rubs, or gallops  GASTROINTESTINAL: Abdomen soft, tender over lower abdomen, Nondistended; Bowel sounds normal  EXTREMITIES:   No clubbing, cyanosis, or pedal edema  PSYCH: Normal Affect, Normal Behavior  NEUROLOGY:   - Mental status A&O x 3  SKIN: No rashes or lesions  MUSCULOSKELETAL: No joint swelling

## 2023-07-23 NOTE — H&P ADULT - PROBLEM SELECTOR PLAN 2
Pain possibly 2/2 UTI.  Unclear cause of pain, numbness and incontinence. Possibly injury from radiation. No weakness or back pain to suggest vertebral/SC injury.    - monitor with treatment of UTI  - reviewed urology consult.

## 2023-07-23 NOTE — H&P ADULT - HISTORY OF PRESENT ILLNESS
59 y/o M with pmh of HTN, HLD, anxiety/depression, HIV on Biktarvy prostate s/p radiation (2016), anal ca s/p radiation c/b rectal perforation s/p abdominal peritoneal resection with end colostomy, radiation cystitis and ureteral strictures s/p ureteral stents p/w penile discomfort.  Pt reports 2-3 weeks of penile pain and numbness.  He also developed dysuria and hematuria.  Pt has had some incontinence of urine.  He reports no fever or chills.  No flank pain, or lower back pain.  He has sharp midline abd pain.  No nausea, or vomiting.  No change in ostomy output.      In the ED, pt was given ceftriaxone, Tylenol, and oxycodone.

## 2023-07-23 NOTE — H&P ADULT - PROBLEM SELECTOR PLAN 1
Reviewed prior cultures showing Klebsiella and E faecalis.   - will broaden coverage to vancomycin and Zosyn   - f/u cultures

## 2023-07-23 NOTE — H&P ADULT - NSHPREVIEWOFSYSTEMS_GEN_ALL_CORE
Review of Systems:   CONSTITUTIONAL: No fever or chills  EYES: No eye pain, visual disturbances, or discharge  ENMT:  No difficulty hearing, no throat pain  NECK: No pain or stiffness  RESPIRATORY: No cough, No shortness of breath  CARDIOVASCULAR: No chest pain, palpitations, dizziness, or leg swelling  GASTROINTESTINAL: No abdominal pain, nausea, vomiting or diarrhea  GENITOURINARY: Penile discomfort, numbness, dysuria, and hematuria  NEUROLOGICAL: No headaches, weakness, or numbness  SKIN: No rashes, or lesions   LYMPH NODES: No enlarged glands  ENDOCRINE: No heat or cold intolerance  MUSCULOSKELETAL: No joint pain or swelling  PSYCHIATRIC: No depression or anxiety  HEME/LYMPH: No easy bruising, or bleeding  ALLERGY AND IMMUNOLOGIC: No hives or eczema

## 2023-07-24 ENCOUNTER — NON-APPOINTMENT (OUTPATIENT)
Age: 58
End: 2023-07-24

## 2023-07-24 ENCOUNTER — TRANSCRIPTION ENCOUNTER (OUTPATIENT)
Age: 58
End: 2023-07-24

## 2023-07-24 ENCOUNTER — APPOINTMENT (OUTPATIENT)
Dept: UROLOGY | Facility: CLINIC | Age: 58
End: 2023-07-24

## 2023-07-24 ENCOUNTER — RX RENEWAL (OUTPATIENT)
Age: 58
End: 2023-07-24

## 2023-07-24 VITALS
DIASTOLIC BLOOD PRESSURE: 84 MMHG | RESPIRATION RATE: 17 BRPM | OXYGEN SATURATION: 99 % | HEART RATE: 75 BPM | SYSTOLIC BLOOD PRESSURE: 126 MMHG | TEMPERATURE: 98 F

## 2023-07-24 LAB
4/8 RATIO: 0.41 RATIO — LOW (ref 0.9–3.6)
ABS CD8: 896 CELLS/UL — HIGH (ref 142–740)
ALBUMIN SERPL ELPH-MCNC: 3.8 G/DL — SIGNIFICANT CHANGE UP (ref 3.3–5)
ALP SERPL-CCNC: 90 U/L — SIGNIFICANT CHANGE UP (ref 40–120)
ALT FLD-CCNC: 11 U/L — SIGNIFICANT CHANGE UP (ref 4–41)
ANION GAP SERPL CALC-SCNC: 10 MMOL/L — SIGNIFICANT CHANGE UP (ref 7–14)
AST SERPL-CCNC: 13 U/L — SIGNIFICANT CHANGE UP (ref 4–40)
BASOPHILS # BLD AUTO: 0.04 K/UL — SIGNIFICANT CHANGE UP (ref 0–0.2)
BASOPHILS NFR BLD AUTO: 1 % — SIGNIFICANT CHANGE UP (ref 0–2)
BILIRUB SERPL-MCNC: 0.3 MG/DL — SIGNIFICANT CHANGE UP (ref 0.2–1.2)
BUN SERPL-MCNC: 13 MG/DL — SIGNIFICANT CHANGE UP (ref 7–23)
CALCIUM SERPL-MCNC: 9.3 MG/DL — SIGNIFICANT CHANGE UP (ref 8.4–10.5)
CD3 BLASTS SPEC-ACNC: 1274 CELLS/UL — SIGNIFICANT CHANGE UP (ref 672–1870)
CD3 BLASTS SPEC-ACNC: 81 % — SIGNIFICANT CHANGE UP (ref 59–83)
CD4 %: 23 % — LOW (ref 30–62)
CD8 %: 57 % — HIGH (ref 12–36)
CHLORIDE SERPL-SCNC: 105 MMOL/L — SIGNIFICANT CHANGE UP (ref 98–107)
CO2 SERPL-SCNC: 25 MMOL/L — SIGNIFICANT CHANGE UP (ref 22–31)
CREAT SERPL-MCNC: 0.87 MG/DL — SIGNIFICANT CHANGE UP (ref 0.5–1.3)
EGFR: 101 ML/MIN/1.73M2 — SIGNIFICANT CHANGE UP
EOSINOPHIL # BLD AUTO: 0.07 K/UL — SIGNIFICANT CHANGE UP (ref 0–0.5)
EOSINOPHIL NFR BLD AUTO: 1.8 % — SIGNIFICANT CHANGE UP (ref 0–6)
GLUCOSE SERPL-MCNC: 95 MG/DL — SIGNIFICANT CHANGE UP (ref 70–99)
HCT VFR BLD CALC: 37.8 % — LOW (ref 39–50)
HGB BLD-MCNC: 12.5 G/DL — LOW (ref 13–17)
HIV-1 VIRAL LOAD RESULT: SIGNIFICANT CHANGE UP
HIV1 RNA # SERPL NAA+PROBE: SIGNIFICANT CHANGE UP COPIES/ML
HIV1 RNA SER-IMP: SIGNIFICANT CHANGE UP
HIV1 RNA SERPL NAA+PROBE-ACNC: SIGNIFICANT CHANGE UP
HIV1 RNA SERPL NAA+PROBE-LOG#: SIGNIFICANT CHANGE UP LG COP/ML
IANC: 1.51 K/UL — LOW (ref 1.8–7.4)
IMM GRANULOCYTES NFR BLD AUTO: 0.3 % — SIGNIFICANT CHANGE UP (ref 0–0.9)
LYMPHOCYTES # BLD AUTO: 1.67 K/UL — SIGNIFICANT CHANGE UP (ref 1–3.3)
LYMPHOCYTES # BLD AUTO: 43 % — SIGNIFICANT CHANGE UP (ref 13–44)
MAGNESIUM SERPL-MCNC: 2 MG/DL — SIGNIFICANT CHANGE UP (ref 1.6–2.6)
MCHC RBC-ENTMCNC: 29.1 PG — SIGNIFICANT CHANGE UP (ref 27–34)
MCHC RBC-ENTMCNC: 33.1 GM/DL — SIGNIFICANT CHANGE UP (ref 32–36)
MCV RBC AUTO: 87.9 FL — SIGNIFICANT CHANGE UP (ref 80–100)
MONOCYTES # BLD AUTO: 0.58 K/UL — SIGNIFICANT CHANGE UP (ref 0–0.9)
MONOCYTES NFR BLD AUTO: 14.9 % — HIGH (ref 2–14)
NEUTROPHILS # BLD AUTO: 1.51 K/UL — LOW (ref 1.8–7.4)
NEUTROPHILS NFR BLD AUTO: 39 % — LOW (ref 43–77)
NRBC # BLD: 0 /100 WBCS — SIGNIFICANT CHANGE UP (ref 0–0)
NRBC # FLD: 0 K/UL — SIGNIFICANT CHANGE UP (ref 0–0)
PHOSPHATE SERPL-MCNC: 4.3 MG/DL — SIGNIFICANT CHANGE UP (ref 2.5–4.5)
PLATELET # BLD AUTO: 294 K/UL — SIGNIFICANT CHANGE UP (ref 150–400)
POTASSIUM SERPL-MCNC: 3.6 MMOL/L — SIGNIFICANT CHANGE UP (ref 3.5–5.3)
POTASSIUM SERPL-SCNC: 3.6 MMOL/L — SIGNIFICANT CHANGE UP (ref 3.5–5.3)
PROT SERPL-MCNC: 6.6 G/DL — SIGNIFICANT CHANGE UP (ref 6–8.3)
RBC # BLD: 4.3 M/UL — SIGNIFICANT CHANGE UP (ref 4.2–5.8)
RBC # FLD: 13.7 % — SIGNIFICANT CHANGE UP (ref 10.3–14.5)
SODIUM SERPL-SCNC: 140 MMOL/L — SIGNIFICANT CHANGE UP (ref 135–145)
T-CELL CD4 SUBSET PNL BLD: 368 CELLS/UL — LOW (ref 489–1457)
WBC # BLD: 3.88 K/UL — SIGNIFICANT CHANGE UP (ref 3.8–10.5)
WBC # FLD AUTO: 3.88 K/UL — SIGNIFICANT CHANGE UP (ref 3.8–10.5)

## 2023-07-24 PROCEDURE — 99239 HOSP IP/OBS DSCHRG MGMT >30: CPT | Mod: GC

## 2023-07-24 RX ORDER — IBUPROFEN 200 MG
400 TABLET ORAL ONCE
Refills: 0 | Status: COMPLETED | OUTPATIENT
Start: 2023-07-24 | End: 2023-07-24

## 2023-07-24 RX ORDER — CIPROFLOXACIN LACTATE 400MG/40ML
1 VIAL (ML) INTRAVENOUS
Qty: 3 | Refills: 0
Start: 2023-07-24 | End: 2023-07-26

## 2023-07-24 RX ADMIN — PIPERACILLIN AND TAZOBACTAM 25 GRAM(S): 4; .5 INJECTION, POWDER, LYOPHILIZED, FOR SOLUTION INTRAVENOUS at 05:18

## 2023-07-24 RX ADMIN — BICTEGRAVIR SODIUM, EMTRICITABINE, AND TENOFOVIR ALAFENAMIDE FUMARATE 1 TABLET(S): 30; 120; 15 TABLET ORAL at 11:20

## 2023-07-24 RX ADMIN — Medication 400 MILLIGRAM(S): at 13:02

## 2023-07-24 RX ADMIN — Medication 400 MILLIGRAM(S): at 12:04

## 2023-07-24 RX ADMIN — Medication 100 MILLIGRAM(S): at 05:18

## 2023-07-24 RX ADMIN — Medication 240 MILLIGRAM(S): at 05:18

## 2023-07-24 NOTE — DISCHARGE NOTE NURSING/CASE MANAGEMENT/SOCIAL WORK - PATIENT PORTAL LINK FT
You can access the FollowMyHealth Patient Portal offered by [facility] by registering at the following website: [email or web address]. By joining [employer]’s FollowMyHealth portal, you will also be able to view your health information using other applications (apps) compatible with our system.

## 2023-07-24 NOTE — DISCHARGE NOTE PROVIDER - HOSPITAL COURSE
57 y/o M with pmh of HTN, HLD, anxiety/depression, HIV on Biktarvy prostate s/p radiation (2016), anal ca s/p radiation c/b rectal perforation s/p abdominal peritoneal resection with end colostomy, radiation cystitis and ureteral strictures s/p ureteral stents p/w penile discomfort.  Pt reports 2-3 weeks of penile pain and numbness.  He also developed dysuria and hematuria.  Pt has had some incontinence of urine.  He reports no fever or chills.  No flank pain, or lower back pain.  He has sharp midline abd pain.  No nausea, or vomiting.  No change in ostomy output.  In the ED, pt was given ceftriaxone, Tylenol, and oxycodone.  Pt's UA found to be positive for LE and bacteria, previous urine cultures growing klebsiella and e faecalis sensitive to zosyn. Urine cultures indeterminate. Pt was still noting hematuria/ dysuria, however making urine and no signs of retention. As per urology recs- can follow up as outpatient. Pt's chronic abdominal pain has remained at baseline and managed with tylenol. He was continued on metoprolol and diltiazem for his HTN, as well as olanzapine and xanax for anxiety.        57 y/o M with pmh of HTN, HLD, anxiety/depression, HIV on Biktarvy prostate s/p radiation (2016), anal ca s/p radiation c/b rectal perforation s/p abdominal peritoneal resection with end colostomy, radiation cystitis and ureteral strictures s/p ureteral stents p/w penile discomfort.  Pt reports 2-3 weeks of penile pain and numbness.  He also developed dysuria and hematuria.  Pt has had some incontinence of urine.  He reports no fever or chills.  No flank pain, or lower back pain.  He has sharp midline abd pain.  No nausea, or vomiting.  No change in ostomy output.  In the ED, pt was given ceftriaxone, Tylenol, and oxycodone.  Pt's UA found to be positive for LE and bacteria, previous urine cultures growing klebsiella and e faecalis sensitive to zosyn. Urine cultures here noniformative. Pt was still noting hematuria/ dysuria, however making urine and no signs of retention. As per urology recs- can follow up as outpatient, no objection to discharge. Patient transitioned from Zosyn to PO cipro to complete course of treatment for acute cystitis.. Pt's chronic abdominal pain has remained at baseline and managed with tylenol. He was continued on metoprolol and diltiazem for his HTN, as well as olanzapine and xanax for anxiety, biktarvy for his HIV (VL undetectable).

## 2023-07-24 NOTE — DISCHARGE NOTE PROVIDER - NSDCFUSCHEDAPPT_GEN_ALL_CORE_FT
Denise Gordon  [facility] Physician [facility]  [address]ISEASE [address]  Scheduled Appointment: 08/10/2023    Ravi Mathew  [facility]  UROLOGY [address]  Scheduled Appointment: 08/14/2023    Kenny Lim  [facility]  CARDIOLOGY [id number] [facility]  Scheduled Appointment: 08/17/2023    Kenny Lim  [facility]  CARDIOLOGY [id number] [facility]  Scheduled Appointment: 09/21/2023     Denise Gordon  [facility]  IN[address]ASE [address]  Scheduled Appointment: 08/10/2023    [facility]  ONCPAINMGT [address]   Scheduled Appointment: 08/15/2023    Kenny Lim  [facility]  CARDIOLOGY [id number]  Scheduled Appointment: 09/21/2023

## 2023-07-24 NOTE — DISCHARGE NOTE PROVIDER - PROVIDER TOKENS
FREE:[LAST:[Jimmy],PHONE:[(   )    -],FAX:[(   )    -],ADDRESS:[[facility]  [address] [place] [address]  [phone]],FOLLOWUP:[1 week]]

## 2023-07-24 NOTE — DISCHARGE NOTE PROVIDER - NSDCCPTREATMENT_GEN_ALL_CORE_FT
PRINCIPAL PROCEDURE  Procedure: Ultrasound, bladder  Findings and Treatment: Procedure was performed in the Emergency Department by a credentialed   Emergency Medicine Attending Physician  EXAM:  ER PVR URINE US NO IMG    ORDER COMMENTS: PVR   PROCEDURE DATE:  07/22/2023    FOCUSED ED ULTRASOUND REPORT  INTERPRETATION:  Indication: Concern for urinary retention.  Findings: Grayscale imaging of the bladder performed in multiple planes   postvoid.  No distended bladder is noted.  IMPRESSION: No urinary retention noted.

## 2023-07-24 NOTE — PROGRESS NOTE ADULT - PROBLEM SELECTOR PLAN 3
- Pt with chronic abd pain, possibly exacerbated by UTI.    - tylenol prn pain   - If worsening, will eval further with CT.
- Pt with chronic abd pain, possibly exacerbated by UTI.    - tylenol prn pain   - If worsening, will eval further with CT.

## 2023-07-24 NOTE — DISCHARGE NOTE NURSING/CASE MANAGEMENT/SOCIAL WORK - NSDCPEFALRISK_GEN_ALL_CORE
For information on Fall & Injury Prevention, visit: [email or web address] OR  [email or web address] OR  https://www.cdc.gov/steadi/patient.html

## 2023-07-24 NOTE — PROGRESS NOTE ADULT - ATTENDING COMMENTS
57 y/o M with pmh of HTN, HLD, anxiety/depression, HIV on Biktarvy (prior undetectable, awaiting viral load this admission) prostate s/p radiation (2016) and received Lupron, anal ca s/p radiation c/b rectal perforation s/p abdominal peritoneal resection with end colostomy, radiation cystitis and ureteral strictures s/p ureteral stents (last changed 6/1) p/w penile discomfort, dysuria and hematuria x2weeks w/ associated abdominal pain. Patient found to have UA w/ bacteria, negative nitrites, leuk esterase positive. Ucx pending. Patient on ceftriaxone. Urology following no need for additional imaging, recommending follow up Ucx to tailor abx. Attributing hematuria due to indwelling stents and deferring further workup as outpatient.     Discharge pending Ucx results and clinical improvement    Patient seen as 2nd touch H&P after midnight. Plan of care discussed w/ resident team.
Patient seen and examined, d/w Dr. Mcdermott, agree w/ above.     57 yo M w/ HIV (controlled, on Biktarvy, VL undetectable), HTN, HLD, anxiety/depression, prostate and anal cancer, s/p radiation, c/b rectal perforation, abdominal peritoneal resection with end colostomy, radiation cystitis, ureteral strictures s/p ureteral stents, p/w penile discomfort, dysuria and hematuria, being treated for suspected UTI, now clinically improved, feels comfortable going home today (team d/w urology, no objection to discharge). Ucx here non-informative, thus will base antibiotic regimen on prior culture data (had klebsiella and E. faecalis previously, both sensitive to cipro). Anticipatory guidance provided, patient to followup with urology as outpatient, is in agreement with discharge plan, no other questions/concerns at this time.     Discharge time 31 minutes.

## 2023-07-24 NOTE — PROGRESS NOTE ADULT - PROBLEM SELECTOR PLAN 1
- UA positive for LE and bacteria  - Reviewed prior cultures showing Klebsiella and E faecalis sensitive to zosyn  - will continue with Zosyn   - f/u urine cultures - UA positive for LE and bacteria  - Reviewed prior cultures showing Klebsiella and E faecalis sensitive to zosyn  - will continue with Zosyn, consider PO ciprofloxacin  - urine cultures non-conclusive - UA positive for LE and bacteria  - Reviewed prior cultures showing Klebsiella and E faecalis sensitive to zosyn  - currently on zosyn, consider PO ciprofloxacin  - urine cultures non-conclusive - UA positive for LE and bacteria  - urine cultures here non-informative, patient clinically improved, will use prior data to guide therapy  - Reviewed prior cultures showing Klebsiella and E faecalis sensitive to zosyn (and cipro)  - currently on zosyn, consider PO ciprofloxacin to complete course

## 2023-07-24 NOTE — PROGRESS NOTE ADULT - SUBJECTIVE AND OBJECTIVE BOX
PROGRESS NOTE:   Authored by Dr. Aparna Mcdermott MD (PGY-1). Pager [facility] [phone] / PHILLIP ( [phone]) or via TEAMS    Patient is a 58y old  Male who presents with a chief complaint of Penile pain, numbness (23 Jul 2023 09:34)      SUBJECTIVE / OVERNIGHT EVENTS:  No acute events overnight.     ADDITIONAL REVIEW OF SYSTEMS:  Patient denies fevers, chills, chest pain, shortness of breath, nausea, abdominal pain, diarrhea, constipation, dysuria, leg swelling, headache, light headedness.    MEDICATIONS  (STANDING):  bictegravir 50 mG/emtricitabine 200 mG/tenofovir alafenamide 25 mG (BIKTARVY) 1 Tablet(s) Oral daily  diltiazem    milliGRAM(s) Oral daily  enoxaparin Injectable 40 milliGRAM(s) SubCutaneous every 24 hours  metoprolol succinate  milliGRAM(s) Oral daily  OLANZapine 10 milliGRAM(s) Oral at bedtime  piperacillin/tazobactam IVPB.. 3.375 Gram(s) IV Intermittent every 8 hours  tamsulosin 0.4 milliGRAM(s) Oral at bedtime    MEDICATIONS  (PRN):  acetaminophen     Tablet .. 650 milliGRAM(s) Oral every 6 hours PRN Temp greater or equal to 38C (100.4F), Mild Pain (1 - 3)  ALPRAZolam 1 milliGRAM(s) Oral three times a day PRN anxiety  melatonin 3 milliGRAM(s) Oral at bedtime PRN Insomnia  zolpidem 5 milliGRAM(s) Oral at bedtime PRN Insomnia  zolpidem 5 milliGRAM(s) Oral at bedtime PRN Insomnia      CAPILLARY BLOOD GLUCOSE        I&O's Summary      PHYSICAL EXAM:  Vital Signs Last 24 Hrs  T(C): 36.9 (24 Jul 2023 05:19), Max: 36.9 (24 Jul 2023 05:19)  T(F): 98.4 (24 Jul 2023 05:19), Max: 98.4 (24 Jul 2023 05:19)  HR: 77 (24 Jul 2023 05:19) (72 - 88)  BP: 140/100 (24 Jul 2023 05:19) (133/66 - 150/95)  BP(mean): --  RR: 17 (24 Jul 2023 05:19) (17 - 18)  SpO2: 98% (24 Jul 2023 05:19) (95% - 98%)    Parameters below as of 24 Jul 2023 05:19  Patient On (Oxygen Delivery Method): room air        CONSTITUTIONAL: NAD, well-developed  RESPIRATORY: Normal respiratory effort; lungs are clear to auscultation bilaterally  CARDIOVASCULAR: Regular rate and rhythm, normal S1 and S2, no murmur/rub/gallop; No lower extremity edema; Peripheral pulses are 2+ bilaterally  ABDOMEN: Nontender to palpation, normoactive bowel sounds, no rebound/guarding; No hepatosplenomegaly  MUSCLOSKELETAL: no clubbing or cyanosis of digits; no joint swelling or tenderness to palpation  PSYCH: A+O to person, place, and time; affect appropriate    LABS:                        12.6   4.90  )-----------( 302      ( 23 Jul 2023 07:36 )             38.6     07-23    142  |  105  |  12  ----------------------------<  101<H>  3.7   |  24  |  0.85    Ca    9.4      23 Jul 2023 07:36  Phos  4.0     07-23  Mg     2.10     07-23    TPro  6.9  /  Alb  4.1  /  TBili  <0.2  /  DBili  x   /  AST  18  /  ALT  10  /  AlkPhos  99  07-22          Urinalysis Basic - ( 23 Jul 2023 07:36 )    Color: x / Appearance: x / SG: x / pH: x  Gluc: 101 mg/dL / Ketone: x  / Bili: x / Urobili: x   Blood: x / Protein: x / Nitrite: x   Leuk Esterase: x / RBC: x / WBC x   Sq Epi: x / Non Sq Epi: x / Bacteria: x        Culture - Urine (collected 22 Jul 2023 14:35)  Source: Clean Catch Clean Catch (Midstream)  Final Report (23 Jul 2023 15:45):    <10,000 CFU/mL Normal Urogenital Genet        Tele Reviewed:    RADIOLOGY & ADDITIONAL TESTS:  Results Reviewed:   Imaging Personally Reviewed:  Electrocardiogram Personally Reviewed:     PROGRESS NOTE:   Authored by Dr. Aparna Mcdermott MD (PGY-1). Pager [facility] [phone] / PHILLIP ( [phone]) or via TEAMS    Patient is a 58y old  Male who presents with a chief complaint of Penile pain, numbness (23 Jul 2023 09:34)      SUBJECTIVE / OVERNIGHT EVENTS:  No acute events overnight. Still notes ongoing dysuria, hematuria, numbness to penile region.     ADDITIONAL REVIEW OF SYSTEMS:  Patient denies fevers, chills, chest pain, shortness of breath, nausea, abdominal pain, diarrhea, constipation, leg swelling, headache, light headedness.    MEDICATIONS  (STANDING):  bictegravir 50 mG/emtricitabine 200 mG/tenofovir alafenamide 25 mG (BIKTARVY) 1 Tablet(s) Oral daily  diltiazem    milliGRAM(s) Oral daily  enoxaparin Injectable 40 milliGRAM(s) SubCutaneous every 24 hours  metoprolol succinate  milliGRAM(s) Oral daily  OLANZapine 10 milliGRAM(s) Oral at bedtime  piperacillin/tazobactam IVPB.. 3.375 Gram(s) IV Intermittent every 8 hours  tamsulosin 0.4 milliGRAM(s) Oral at bedtime    MEDICATIONS  (PRN):  acetaminophen     Tablet .. 650 milliGRAM(s) Oral every 6 hours PRN Temp greater or equal to 38C (100.4F), Mild Pain (1 - 3)  ALPRAZolam 1 milliGRAM(s) Oral three times a day PRN anxiety  melatonin 3 milliGRAM(s) Oral at bedtime PRN Insomnia  zolpidem 5 milliGRAM(s) Oral at bedtime PRN Insomnia  zolpidem 5 milliGRAM(s) Oral at bedtime PRN Insomnia      CAPILLARY BLOOD GLUCOSE        I&O's Summary      PHYSICAL EXAM:  Vital Signs Last 24 Hrs  T(C): 36.9 (24 Jul 2023 05:19), Max: 36.9 (24 Jul 2023 05:19)  T(F): 98.4 (24 Jul 2023 05:19), Max: 98.4 (24 Jul 2023 05:19)  HR: 77 (24 Jul 2023 05:19) (72 - 88)  BP: 140/100 (24 Jul 2023 05:19) (133/66 - 150/95)  BP(mean): --  RR: 17 (24 Jul 2023 05:19) (17 - 18)  SpO2: 98% (24 Jul 2023 05:19) (95% - 98%)    Parameters below as of 24 Jul 2023 05:19  Patient On (Oxygen Delivery Method): room air        CONSTITUTIONAL: NAD, well-developed  RESPIRATORY: Normal respiratory effort; lungs are clear to auscultation bilaterally  CARDIOVASCULAR: Regular rate and rhythm, normal S1 and S2, no murmur/rub/gallop; No lower extremity edema; Peripheral pulses are 2+ bilaterally  ABDOMEN: Nontender to palpation, normoactive bowel sounds, no rebound/guarding; No hepatosplenomegaly  MUSCLOSKELETAL: no clubbing or cyanosis of digits; no joint swelling or tenderness to palpation  PSYCH: A+O to person, place, and time; affect appropriate    LABS:                        12.6   4.90  )-----------( 302      ( 23 Jul 2023 07:36 )             38.6     07-23    142  |  105  |  12  ----------------------------<  101<H>  3.7   |  24  |  0.85    Ca    9.4      23 Jul 2023 07:36  Phos  4.0     07-23  Mg     2.10     07-23    TPro  6.9  /  Alb  4.1  /  TBili  <0.2  /  DBili  x   /  AST  18  /  ALT  10  /  AlkPhos  99  07-22          Urinalysis Basic - ( 23 Jul 2023 07:36 )    Color: x / Appearance: x / SG: x / pH: x  Gluc: 101 mg/dL / Ketone: x  / Bili: x / Urobili: x   Blood: x / Protein: x / Nitrite: x   Leuk Esterase: x / RBC: x / WBC x   Sq Epi: x / Non Sq Epi: x / Bacteria: x        Culture - Urine (collected 22 Jul 2023 14:35)  Source: Clean Catch Clean Catch (Midstream)  Final Report (23 Jul 2023 15:45):    <10,000 CFU/mL Normal Urogenital Genet        Tele Reviewed:    RADIOLOGY & ADDITIONAL TESTS:  Results Reviewed:   Imaging Personally Reviewed:  Electrocardiogram Personally Reviewed:     PROGRESS NOTE:   Authored by Dr. Aparna Mcdermott MD (PGY-1). Pager [facility] [phone] / PHILLIP ( [phone]) or via TEAMS    Patient is a 58y old  Male who presents with a chief complaint of Penile pain, numbness (23 Jul 2023 09:34)      SUBJECTIVE / OVERNIGHT EVENTS:  No acute events overnight. Still notes ongoing dysuria, hematuria, numbness to penile region.     ADDITIONAL REVIEW OF SYSTEMS:  Patient denies fevers, chills, chest pain, shortness of breath, nausea, abdominal pain, diarrhea, constipation, leg swelling, headache, lightheadedness.     MEDICATIONS  (STANDING):  bictegravir 50 mG/emtricitabine 200 mG/tenofovir alafenamide 25 mG (BIKTARVY) 1 Tablet(s) Oral daily  diltiazem    milliGRAM(s) Oral daily  enoxaparin Injectable 40 milliGRAM(s) SubCutaneous every 24 hours  metoprolol succinate  milliGRAM(s) Oral daily  OLANZapine 10 milliGRAM(s) Oral at bedtime  piperacillin/tazobactam IVPB.. 3.375 Gram(s) IV Intermittent every 8 hours  tamsulosin 0.4 milliGRAM(s) Oral at bedtime    MEDICATIONS  (PRN):  acetaminophen     Tablet .. 650 milliGRAM(s) Oral every 6 hours PRN Temp greater or equal to 38C (100.4F), Mild Pain (1 - 3)  ALPRAZolam 1 milliGRAM(s) Oral three times a day PRN anxiety  melatonin 3 milliGRAM(s) Oral at bedtime PRN Insomnia  zolpidem 5 milliGRAM(s) Oral at bedtime PRN Insomnia  zolpidem 5 milliGRAM(s) Oral at bedtime PRN Insomnia      CAPILLARY BLOOD GLUCOSE        I&O's Summary      PHYSICAL EXAM:  Vital Signs Last 24 Hrs  T(C): 36.9 (24 Jul 2023 05:19), Max: 36.9 (24 Jul 2023 05:19)  T(F): 98.4 (24 Jul 2023 05:19), Max: 98.4 (24 Jul 2023 05:19)  HR: 77 (24 Jul 2023 05:19) (72 - 88)  BP: 140/100 (24 Jul 2023 05:19) (133/66 - 150/95)  BP(mean): --  RR: 17 (24 Jul 2023 05:19) (17 - 18)  SpO2: 98% (24 Jul 2023 05:19) (95% - 98%)    Parameters below as of 24 Jul 2023 05:19  Patient On (Oxygen Delivery Method): room air        CONSTITUTIONAL: NAD, well-developed  RESPIRATORY: Normal respiratory effort; lungs are clear to auscultation bilaterally  CARDIOVASCULAR: Regular rate and rhythm, normal S1 and S2, no murmur/rub/gallop; No lower extremity edema; Peripheral pulses are 2+ bilaterally  ABDOMEN: Nontender to palpation, normoactive bowel sounds, no rebound/guarding; No hepatosplenomegaly  MUSCULOSKELETAL: no clubbing or cyanosis of digits; no joint swelling or tenderness to palpation  PSYCH: A+O to person, place, and time; affect appropriate  SKIN: +tattoos    LABS:                                   12.5   3.88  )-----------( 294      ( 24 Jul 2023 05:58 )             37.8                  12.6   4.90  )-----------( 302      ( 23 Jul 2023 07:36 )             38.6     07-24    140  |  105  |  13  ----------------------------<  95  3.6   |  25  |  0.87    Ca    9.3      24 Jul 2023 05:58  Phos  4.3     07-24  Mg     2.00     07-24    TPro  6.6  /  Alb  3.8  /  TBili  0.3  /  DBili  x   /  AST  13  /  ALT  11  /  AlkPhos  90  07-24      07-23    142  |  105  |  12  ----------------------------<  101<H>  3.7   |  24  |  0.85    Ca    9.4      23 Jul 2023 07:36  Phos  4.0     07-23  Mg     2.10     07-23    TPro  6.9  /  Alb  4.1  /  TBili  <0.2  /  DBili  x   /  AST  18  /  ALT  10  /  AlkPhos  99  07-22          Urinalysis Basic - ( 23 Jul 2023 07:36 )    Color: x / Appearance: x / SG: x / pH: x  Gluc: 101 mg/dL / Ketone: x  / Bili: x / Urobili: x   Blood: x / Protein: x / Nitrite: x   Leuk Esterase: x / RBC: x / WBC x   Sq Epi: x / Non Sq Epi: x / Bacteria: x        Culture - Urine (collected 22 Jul 2023 14:35)  Source: Clean Catch Clean Catch (Midstream)  Final Report (23 Jul 2023 15:45):    <10,000 CFU/mL Normal Urogenital Genet    Team d/w Urology: no objection to d/c home today    Tele Reviewed:    RADIOLOGY & ADDITIONAL TESTS:  Results Reviewed:   Imaging Personally Reviewed:  Electrocardiogram Personally Reviewed:

## 2023-07-24 NOTE — PROGRESS NOTE ADULT - PROBLEM SELECTOR PLAN 4
- continue Biktarvy  - last CD4 21% and VL undetectable in May  - HIV viral load pending - continue Biktarvy  - last CD4 21% and VL undetectable in May  - HIV viral load not detected

## 2023-07-24 NOTE — DISCHARGE NOTE PROVIDER - NSDCCPCAREPLAN_GEN_ALL_CORE_FT
PRINCIPAL DISCHARGE DIAGNOSIS  Diagnosis: Acute cystitis  Assessment and Plan of Treatment: You were seen and treated for a Urinary tract infection. You were treated with 3 days of IV antibiotics with some resolution of your symptoms. Your lab values were within normal limits. You were still complaining of burning with urination and dark urine for which you can follow up outpatient Urology.   Follow these instructions at home:  Medicines  Take over-the-counter and prescription medicines only as told by your health care provider.  If you were prescribed an antibiotic medicine, take it as told by your health care provider. Do not stop using the antibiotic even if you start to feel better.  General instructions  Monitor your condition for any changes.  Drink enough fluid to keep your urine pale yellow.  Urinate more often to keep your bladder empty.  If you are female, keep the area around your vagina and rectum clean. Wipe from front to back after urinating or having a bowel movement. Use each piece of toilet paper only once.  Keep all follow-up visits. This is important.  Contact a health care provider if:  You have symptoms of a urine infection, such as:  A burning sensation, or pain when you urinate.  A strong need to urinate, or urinating more often.  Urine turning discolored or cloudy.  Blood in your urine.  Urine that smells bad.  Get help right away if:  You develop signs of a kidney infection, such as:  Back pain or pelvic pain.  A fever or chills.  Nausea or vomiting.  Severe pain that cannot be controlled with medicine.  Summary  Asymptomatic bacteriuria is the presence of a large number of bacteria in the urine without the usual symptoms of burning or frequent urination.  Usually, treatment is not needed for this condition. Treating the condition can lead to other problems, such as a yeast infection or the growth of bacteria that do not respond to treatment.  Some people do need treatment. Treatment is needed if you are pregnant, if you are having a procedure that affects the urinary tract, or if you have had a kidney transplant.  If you were prescribed an antibiotic med

## 2023-07-24 NOTE — DISCHARGE NOTE PROVIDER - CARE PROVIDER_API CALL
Jimmy   [facility]  [address] [place] [id number]  [phone]  Phone: (   )    -  Fax: (   )    -  Follow Up Time: 1 week

## 2023-07-24 NOTE — PROGRESS NOTE ADULT - PROBLEM SELECTOR PLAN 2
-Pain possibly 2/2 UTI.  Unclear cause of pain, numbness and incontinence. Possibly injury from radiation. No weakness or back pain to suggest vertebral/SC injury.    - monitor with treatment of UTI  - reviewed urology consult. -Pain possibly 2/2 UTI.  Unclear cause of pain, numbness and incontinence. Possibly injury from radiation. No weakness or back pain to suggest vertebral/SC injury.    - monitor with treatment of UTI  - reviewed urology consult- outpatient urology follow up

## 2023-07-24 NOTE — DISCHARGE NOTE PROVIDER - NSDCMRMEDTOKEN_GEN_ALL_CORE_FT
acetaminophen 325 mg oral tablet: 2 tab(s) orally every 6 hours As needed Temp greater or equal to 38C (100.4F), Mild Pain (1 - 3)  ALPRAZolam 1 mg oral tablet: 1 tab(s) orally 3 times a day, As Needed  bictegravir/emtricitabine/tenofovir 50 mg-200 mg-25 mg oral tablet: 1 tab(s) orally once a day  DilTIAZem (Eqv-Cardizem CD) 240 mg/24 hours oral capsule, extended release: 1 cap(s) orally once a day  ezetimibe 10 mg oral tablet: 1 tab(s) orally once a day  metoprolol succinate 100 mg oral tablet, extended release: 1 tab(s) orally once a day  OLANZapine 10 mg oral tablet: 1 tab(s) orally once a day (at bedtime)  tamsulosin 0.4 mg oral capsule: 1 cap(s) orally once a day (at bedtime)  zolpidem 10 mg oral tablet: 1 tab(s) orally once a day (at bedtime)   ALPRAZolam 1 mg oral tablet: 1 tab(s) orally 3 times a day, As Needed  bictegravir/emtricitabine/tenofovir 50 mg-200 mg-25 mg oral tablet: 1 tab(s) orally once a day  ciprofloxacin 500 mg oral tablet, extended release: 1 tab(s) orally once a day  DilTIAZem (Eqv-Cardizem CD) 240 mg/24 hours oral capsule, extended release: 1 cap(s) orally once a day  ezetimibe 10 mg oral tablet: 1 tab(s) orally once a day  metoprolol succinate 100 mg oral tablet, extended release: 1 tab(s) orally once a day  OLANZapine 10 mg oral tablet: 1 tab(s) orally once a day (at bedtime)  tamsulosin 0.4 mg oral capsule: 1 cap(s) orally once a day (at bedtime)  zolpidem 10 mg oral tablet: 1 tab(s) orally once a day (at bedtime)   ALPRAZolam 1 mg oral tablet: 1 tab(s) orally 3 times a day, As Needed  bictegravir/emtricitabine/tenofovir 50 mg-200 mg-25 mg oral tablet: 1 tab(s) orally once a day  ciprofloxacin 500 mg oral tablet: 1 tab(s) orally once a day  ciprofloxacin 500 mg oral tablet, extended release: 1 tab(s) orally once a day  DilTIAZem (Eqv-Cardizem CD) 240 mg/24 hours oral capsule, extended release: 1 cap(s) orally once a day  ezetimibe 10 mg oral tablet: 1 tab(s) orally once a day  metoprolol succinate 100 mg oral tablet, extended release: 1 tab(s) orally once a day  OLANZapine 10 mg oral tablet: 1 tab(s) orally once a day (at bedtime)  tamsulosin 0.4 mg oral capsule: 1 cap(s) orally once a day (at bedtime)  zolpidem 10 mg oral tablet: 1 tab(s) orally once a day (at bedtime)

## 2023-07-24 NOTE — PROGRESS NOTE ADULT - ASSESSMENT
57 y/o M with pmh of HTN, HLD, anxiety/depression, HIV on Biktarvy prostate s/p radiation (2016), anal ca s/p radiation c/b rectal perforation s/p abdominal peritoneal resection with end colostomy, radiation cystitis and ureteral strictures s/p ureteral stents p/w penile discomfort, dysuria and hematuria x2weeks. 
59 y/o M with pmh of HTN, HLD, anxiety/depression, HIV on Biktarvy prostate s/p radiation (2016), anal ca s/p radiation c/b rectal perforation s/p abdominal peritoneal resection with end colostomy, radiation cystitis and ureteral strictures s/p ureteral stents p/w penile discomfort, dysuria and hematuria x2weeks.

## 2023-07-25 ENCOUNTER — APPOINTMENT (OUTPATIENT)
Dept: INTERNAL MEDICINE | Facility: CLINIC | Age: 58
End: 2023-07-25

## 2023-07-25 ENCOUNTER — NON-APPOINTMENT (OUTPATIENT)
Age: 58
End: 2023-07-25

## 2023-07-25 RX ORDER — CIPROFLOXACIN LACTATE 400MG/40ML
1 VIAL (ML) INTRAVENOUS
Qty: 4 | Refills: 0
Start: 2023-07-25 | End: 2023-07-28

## 2023-07-28 ENCOUNTER — APPOINTMENT (OUTPATIENT)
Dept: UROLOGY | Facility: CLINIC | Age: 58
End: 2023-07-28
Payer: MEDICAID

## 2023-07-28 VITALS
TEMPERATURE: 97 F | HEART RATE: 105 BPM | OXYGEN SATURATION: 99 % | DIASTOLIC BLOOD PRESSURE: 81 MMHG | SYSTOLIC BLOOD PRESSURE: 116 MMHG

## 2023-07-28 PROCEDURE — 99213 OFFICE O/P EST LOW 20 MIN: CPT

## 2023-07-28 NOTE — ASSESSMENT
[FreeTextEntry1] : 59y/o male, recent  bilateral hydronephrosis mostly on the left side possibly related to colostomy,\par underwent replacement of bilateral ureteral stents 06/01/23\par \par Patient c/o numbness in penis and chronic abdominal pain\par \par New PSA/Renal panel drawn today\par A voided urine specimen was obtained and sent for ua/ucx \par Will F/U in case of positive results\par  \par Will revaluate in 4 months replacing existing stents with either metal or allium device\par \par Referring patient to Neurologist for numbness in penis \par Referring patient to pain management for chronic abdominal pain \par Advising patient to f/u with onco for anal and prostate CA\par

## 2023-07-28 NOTE — HISTORY OF PRESENT ILLNESS
[FreeTextEntry1] : 57y/o male, recent  bilateral hydronephrosis mostly on the left side possibly related to colostomy,\par underwent replacement of bilateral ureteral stents 06/01/23\par \par Patient c/o numbness in penis and chronic abdominal pain\par \par \par

## 2023-08-03 ENCOUNTER — OUTPATIENT (OUTPATIENT)
Dept: OUTPATIENT SERVICES | Facility: HOSPITAL | Age: 58
LOS: 1 days | Discharge: HOME | End: 2023-08-03

## 2023-08-03 DIAGNOSIS — K62.9 DISEASE OF ANUS AND RECTUM, UNSPECIFIED: Chronic | ICD-10-CM

## 2023-08-03 DIAGNOSIS — C18.9 MALIGNANT NEOPLASM OF COLON, UNSPECIFIED: ICD-10-CM

## 2023-08-03 DIAGNOSIS — Z93.3 COLOSTOMY STATUS: Chronic | ICD-10-CM

## 2023-08-04 ENCOUNTER — TRANSCRIPTION ENCOUNTER (OUTPATIENT)
Age: 58
End: 2023-08-04

## 2023-08-06 LAB
ALBUMIN SERPL ELPH-MCNC: 4.5 G/DL
ANION GAP SERPL CALC-SCNC: 15 MMOL/L
APPEARANCE: CLEAR
BACTERIA UR CULT: NORMAL
BACTERIA: NEGATIVE /HPF
BILIRUBIN URINE: NEGATIVE
BLOOD URINE: ABNORMAL
BUN SERPL-MCNC: 16 MG/DL
CALCIUM SERPL-MCNC: 10 MG/DL
CAST: 1 /LPF
CHLORIDE SERPL-SCNC: 104 MMOL/L
CO2 SERPL-SCNC: 22 MMOL/L
COLOR: YELLOW
CREAT SERPL-MCNC: 0.81 MG/DL
EGFR: 103 ML/MIN/1.73M2
EPITHELIAL CELLS: 0 /HPF
GLUCOSE QUALITATIVE U: NEGATIVE MG/DL
GLUCOSE SERPL-MCNC: 106 MG/DL
KETONES URINE: NEGATIVE MG/DL
LEUKOCYTE ESTERASE URINE: ABNORMAL
MICROSCOPIC-UA: NORMAL
NITRITE URINE: NEGATIVE
PH URINE: 6.5
PHOSPHATE SERPL-MCNC: 4 MG/DL
POTASSIUM SERPL-SCNC: 4.4 MMOL/L
PROTEIN URINE: 30 MG/DL
PSA SERPL-MCNC: <0.01 NG/ML
RED BLOOD CELLS URINE: 2 /HPF
REVIEW: NORMAL
SODIUM SERPL-SCNC: 141 MMOL/L
SPECIFIC GRAVITY URINE: 1.01
UROBILINOGEN URINE: 0.2 MG/DL
WHITE BLOOD CELLS URINE: 30 /HPF

## 2023-08-07 ENCOUNTER — APPOINTMENT (OUTPATIENT)
Dept: HEMATOLOGY ONCOLOGY | Facility: CLINIC | Age: 58
End: 2023-08-07
Payer: MEDICAID

## 2023-08-07 VITALS
HEIGHT: 70.98 IN | OXYGEN SATURATION: 98 % | BODY MASS INDEX: 28.92 KG/M2 | SYSTOLIC BLOOD PRESSURE: 142 MMHG | RESPIRATION RATE: 18 BRPM | HEART RATE: 92 BPM | TEMPERATURE: 97.7 F | DIASTOLIC BLOOD PRESSURE: 93 MMHG | WEIGHT: 206.57 LBS

## 2023-08-07 PROCEDURE — 99213 OFFICE O/P EST LOW 20 MIN: CPT

## 2023-08-07 RX ORDER — OXYCODONE AND ACETAMINOPHEN 5; 325 MG/1; MG/1
5-325 TABLET ORAL 3 TIMES DAILY
Qty: 42 | Refills: 0 | Status: ACTIVE | COMMUNITY
Start: 2023-05-19 | End: 1900-01-01

## 2023-08-07 NOTE — HISTORY OF PRESENT ILLNESS
[Disease: _____________________] : Disease: [unfilled] [T: ___] : T[unfilled] [N: ___] : N[unfilled] [M: ___] : M[unfilled] [AJCC Stage: ____] : AJCC Stage: [unfilled] [Treatment Protocol] : Treatment Protocol [de-identified] : In 2020 at age of 55 patient had his screening colonoscopy performed by Dr. Lawrence in June 2020 that as per patient was informed to be within normal limits.  Subsequently patient had felt a bump in the perirectal area and at the time was told by GI that this could be a hemorrhoid and was treated as such.\par  His symptoms did improve and was eventually referred to Dr. Avilez for further evaluation and in March 2021 patient was seen by Dr. Avilez and underwent an exam and biopsy in April that revealed evidence of anal squamous cell carcinoma.\par  Patient was subsequently referred for evaluation and treatment.  Patient did not have full excision of the tumor rather a excisional biopsy.\par  \par  \par  Of note patient has a history of prostate cancer treated with radiation in 2016 at the [facility] patient received 45 treatments at that time and did not go undergo any surgery and or chemotherapy.\par   [de-identified] : Invasive basaloid squamous carcinoma [FreeTextEntry1] : Mitomycin Day#1, modified schedule RT with Xeloda completed 8/16/21 (condensed RT course) [de-identified] : hasn't been seen since Dec 21(pt states he didn't know he needed follow up) hasn't seen pall care since 2022 (states they didn't call with more appts and didn't call for appt either) being followed by Surgery and Urology  notes penile edema and numbness

## 2023-08-07 NOTE — REVIEW OF SYSTEMS
[Fatigue] : fatigue [Anxiety] : anxiety [Negative] : Psychiatric [FreeTextEntry7] : pelvic pain [FreeTextEntry8] : penile numbness

## 2023-08-07 NOTE — PHYSICAL EXAM
[Restricted in physically strenuous activity but ambulatory and able to carry out work of a light or sedentary nature] : Status 1- Restricted in physically strenuous activity but ambulatory and able to carry out work of a light or sedentary nature, e.g., light house work, office work [Obese] : obese [Normal] : affect appropriate [de-identified] : anicteric  [de-identified] : normal respiratory effort, no audible wheeze [de-identified] : reg rate

## 2023-08-10 ENCOUNTER — LABORATORY RESULT (OUTPATIENT)
Age: 58
End: 2023-08-10

## 2023-08-10 ENCOUNTER — APPOINTMENT (OUTPATIENT)
Dept: INFECTIOUS DISEASE | Facility: CLINIC | Age: 58
End: 2023-08-10
Payer: MEDICAID

## 2023-08-10 ENCOUNTER — NON-APPOINTMENT (OUTPATIENT)
Age: 58
End: 2023-08-10

## 2023-08-10 ENCOUNTER — OUTPATIENT (OUTPATIENT)
Dept: OUTPATIENT SERVICES | Facility: HOSPITAL | Age: 58
LOS: 1 days | End: 2023-08-10
Payer: MEDICAID

## 2023-08-10 ENCOUNTER — RESULT REVIEW (OUTPATIENT)
Age: 58
End: 2023-08-10

## 2023-08-10 VITALS
BODY MASS INDEX: 29.12 KG/M2 | HEART RATE: 103 BPM | OXYGEN SATURATION: 97 % | WEIGHT: 208 LBS | TEMPERATURE: 97.6 F | HEIGHT: 70.98 IN | DIASTOLIC BLOOD PRESSURE: 84 MMHG | SYSTOLIC BLOOD PRESSURE: 126 MMHG

## 2023-08-10 DIAGNOSIS — K62.9 DISEASE OF ANUS AND RECTUM, UNSPECIFIED: Chronic | ICD-10-CM

## 2023-08-10 DIAGNOSIS — B20 HUMAN IMMUNODEFICIENCY VIRUS [HIV] DISEASE: ICD-10-CM

## 2023-08-10 DIAGNOSIS — N20.0 CALCULUS OF KIDNEY: ICD-10-CM

## 2023-08-10 DIAGNOSIS — K65.1 PERITONEAL ABSCESS: ICD-10-CM

## 2023-08-10 LAB
4/8 RATIO: 0.32 RATIO — LOW (ref 0.9–3.6)
ABS CD8: 1838 CELLS/UL — HIGH (ref 142–740)
ALBUMIN SERPL ELPH-MCNC: 4.3 G/DL — SIGNIFICANT CHANGE UP (ref 3.3–5)
ALP SERPL-CCNC: 116 U/L — SIGNIFICANT CHANGE UP (ref 40–120)
ALT FLD-CCNC: 8 U/L — LOW (ref 10–45)
ANION GAP SERPL CALC-SCNC: 12 MMOL/L — SIGNIFICANT CHANGE UP (ref 5–17)
APPEARANCE UR: ABNORMAL
AST SERPL-CCNC: 11 U/L — SIGNIFICANT CHANGE UP (ref 10–40)
BACTERIA # UR AUTO: ABNORMAL /HPF
BASOPHILS # BLD AUTO: 0.04 K/UL — SIGNIFICANT CHANGE UP (ref 0–0.2)
BASOPHILS NFR BLD AUTO: 0.4 % — SIGNIFICANT CHANGE UP (ref 0–2)
BILIRUB SERPL-MCNC: 0.2 MG/DL — SIGNIFICANT CHANGE UP (ref 0.2–1.2)
BILIRUB UR-MCNC: NEGATIVE — SIGNIFICANT CHANGE UP
BUN SERPL-MCNC: 14 MG/DL — SIGNIFICANT CHANGE UP (ref 7–23)
CALCIUM SERPL-MCNC: 9.6 MG/DL — SIGNIFICANT CHANGE UP (ref 8.4–10.5)
CAST: 3 /LPF — SIGNIFICANT CHANGE UP (ref 0–4)
CD3 BLASTS SPEC-ACNC: 2441 CELLS/UL — HIGH (ref 672–1870)
CD3 BLASTS SPEC-ACNC: 86 % — HIGH (ref 59–83)
CD4 %: 21 % — LOW (ref 30–62)
CD8 %: 65 % — HIGH (ref 12–36)
CHLORIDE SERPL-SCNC: 104 MMOL/L — SIGNIFICANT CHANGE UP (ref 96–108)
CO2 SERPL-SCNC: 26 MMOL/L — SIGNIFICANT CHANGE UP (ref 22–31)
COLOR SPEC: ABNORMAL
CREAT SERPL-MCNC: 0.97 MG/DL — SIGNIFICANT CHANGE UP (ref 0.5–1.3)
DIFF PNL FLD: ABNORMAL
EGFR: 91 ML/MIN/1.73M2 — SIGNIFICANT CHANGE UP
EOSINOPHIL # BLD AUTO: 0.09 K/UL — SIGNIFICANT CHANGE UP (ref 0–0.5)
EOSINOPHIL NFR BLD AUTO: 1 % — SIGNIFICANT CHANGE UP (ref 0–6)
GLUCOSE SERPL-MCNC: 113 MG/DL — HIGH (ref 70–99)
GLUCOSE UR QL: NEGATIVE MG/DL — SIGNIFICANT CHANGE UP
HCT VFR BLD CALC: 38.5 % — LOW (ref 39–50)
HGB BLD-MCNC: 12.3 G/DL — LOW (ref 13–17)
HYALINE CASTS # UR AUTO: PRESENT
IMM GRANULOCYTES NFR BLD AUTO: 0.2 % — SIGNIFICANT CHANGE UP (ref 0–0.9)
KETONES UR-MCNC: NEGATIVE MG/DL — SIGNIFICANT CHANGE UP
LEUKOCYTE ESTERASE UR-ACNC: ABNORMAL
LYMPHOCYTES # BLD AUTO: 2.72 K/UL — SIGNIFICANT CHANGE UP (ref 1–3.3)
LYMPHOCYTES # BLD AUTO: 29.2 % — SIGNIFICANT CHANGE UP (ref 13–44)
MCHC RBC-ENTMCNC: 29.6 PG — SIGNIFICANT CHANGE UP (ref 27–34)
MCHC RBC-ENTMCNC: 31.9 GM/DL — LOW (ref 32–36)
MCV RBC AUTO: 92.8 FL — SIGNIFICANT CHANGE UP (ref 80–100)
MONOCYTES # BLD AUTO: 1.08 K/UL — HIGH (ref 0–0.9)
MONOCYTES NFR BLD AUTO: 11.6 % — SIGNIFICANT CHANGE UP (ref 2–14)
MUCOUS THREADS # UR AUTO: PRESENT
NEUTROPHILS # BLD AUTO: 5.36 K/UL — SIGNIFICANT CHANGE UP (ref 1.8–7.4)
NEUTROPHILS NFR BLD AUTO: 57.6 % — SIGNIFICANT CHANGE UP (ref 43–77)
NITRITE UR-MCNC: NEGATIVE — SIGNIFICANT CHANGE UP
PH UR: 6 — SIGNIFICANT CHANGE UP (ref 5–8)
PLATELET # BLD AUTO: 344 K/UL — SIGNIFICANT CHANGE UP (ref 150–400)
POTASSIUM SERPL-MCNC: 5 MMOL/L — SIGNIFICANT CHANGE UP (ref 3.5–5.3)
POTASSIUM SERPL-SCNC: 5 MMOL/L — SIGNIFICANT CHANGE UP (ref 3.5–5.3)
PROT SERPL-MCNC: 7 G/DL — SIGNIFICANT CHANGE UP (ref 6–8.3)
PROT UR-MCNC: 100 MG/DL
RBC # BLD: 4.15 M/UL — LOW (ref 4.2–5.8)
RBC # FLD: 13.9 % — SIGNIFICANT CHANGE UP (ref 10.3–14.5)
RBC CASTS # UR COMP ASSIST: 438 /HPF — HIGH (ref 0–4)
REVIEW: SIGNIFICANT CHANGE UP
SODIUM SERPL-SCNC: 142 MMOL/L — SIGNIFICANT CHANGE UP (ref 135–145)
SP GR SPEC: 1.01 — SIGNIFICANT CHANGE UP (ref 1–1.03)
SQUAMOUS # UR AUTO: 1 /HPF — SIGNIFICANT CHANGE UP (ref 0–5)
T-CELL CD4 SUBSET PNL BLD: 594 CELLS/UL — SIGNIFICANT CHANGE UP (ref 489–1457)
UROBILINOGEN FLD QL: 0.2 MG/DL — SIGNIFICANT CHANGE UP (ref 0.2–1)
WBC # BLD: 9.31 K/UL — SIGNIFICANT CHANGE UP (ref 3.8–10.5)
WBC # FLD AUTO: 9.31 K/UL — SIGNIFICANT CHANGE UP (ref 3.8–10.5)
WBC UR QL: 195 /HPF — HIGH (ref 0–5)

## 2023-08-10 PROCEDURE — 86359 T CELLS TOTAL COUNT: CPT

## 2023-08-10 PROCEDURE — 87536 HIV-1 QUANT&REVRSE TRNSCRPJ: CPT

## 2023-08-10 PROCEDURE — 99215 OFFICE O/P EST HI 40 MIN: CPT

## 2023-08-10 PROCEDURE — 85025 COMPLETE CBC W/AUTO DIFF WBC: CPT

## 2023-08-10 PROCEDURE — 80053 COMPREHEN METABOLIC PANEL: CPT

## 2023-08-10 PROCEDURE — G0463: CPT

## 2023-08-10 PROCEDURE — 87086 URINE CULTURE/COLONY COUNT: CPT

## 2023-08-10 PROCEDURE — 81001 URINALYSIS AUTO W/SCOPE: CPT

## 2023-08-10 PROCEDURE — 86360 T CELL ABSOLUTE COUNT/RATIO: CPT

## 2023-08-10 NOTE — REVIEW OF SYSTEMS
[Fever] : no fever [Chills] : no chills [Eye Pain] : no eye pain [Red Eyes] : eyes not red [Earache] : no earache [Loss Of Hearing] : no hearing loss [Chest Pain] : no chest pain [Palpitations] : no palpitations [Shortness Of Breath] : no shortness of breath [Wheezing] : no wheezing [Abdominal Pain] : abdominal pain [Vomiting] : no vomiting [Dysuria] : dysuria [Skin Lesions] : no skin lesions [Confused] : no confusion [Convulsions] : no convulsions [Suicidal] : not suicidal [Easy Bleeding] : no tendency for easy bleeding [Easy Bruising] : no tendency for easy bruising [Negative] : Heme/Lymph [FreeTextEntry8] : penile and scrotal numbness  [FreeTextEntry9] : back pain

## 2023-08-10 NOTE — ASSESSMENT
[FreeTextEntry1] : 57 m with HTN, HIV diagnosed 1992 on Biktarvy,  prostate ca 2016 s/p radiation, anal cancer (dx 4/2021) s/p radiation and chemotherapy (last 8/2021) complicated by an ulcer, abscess and rectal bleeding s/p loop colostomy 3/30/22 and then s/p APR with end colostomy creation, VRAM flap closure, 8/24 found to have a large abscess as well, abscess cx with clostridium, bacteroides  again was admitted for GIB and pelvic/groin abscess s/p drain placement and CT also showed L ischial tuberosity osteo so pt received zosyn in the hospital and was discharged with PO levo and flagyl to finish a 6 week course biktarvy was held during the hospitalization for some time in view of OR and GIB but now compliant  he was admitted 11/2022 with hematuria and hydro, urine cx and AFB were negative, s/p b/l nephrostomy, and then had laser lithotripsy and stent placement 1/25/23, the nephrostomies were clamped and removed now with b/l stents, now here for f/u has severe pain while sitting down at the flap site, dysuria and penile/scrotal numbness, pt had multiple ER visits for pain and last one 7/2023, was told he has UTI but urine cx was negative he also has back pain and LE weakness as well       HIV on Biktarvy, VL:UD, CD4 368 c/w biktarvy and repeat VL, CD4  anal Ca s/p chemo and RT with rectal ulcer, abscess and bleed s/p loop colostomy 3/30/22 and then s/p APR with end colostomy creation, VRAM flap closure, 8/24 found to have a large abscess as well, abscess cx with clostridium, bacteroides  again was admitted for GIB and pelvic/groin abscess s/p drain placement and CT also showed L ischial tuberosity osteo so pt received zosyn in the hospital and was discharged with PO levo and flagyl to finish a 6 week course  penile and scrotal numbness with incontinency pt stated this started after surgery so likely a surgical complication but now also has back pain will get LS spine MRI and pelvic MRI   hematuria, hydro, nephrolithiasis  admitted 11/2022 with hematuria and hydro, urine cx and AFB were negative, s/p b/l nephrostomy, and then had laser lithotripsy and stent placement 1/25/23, the nephrostomies were clamped and removed now with b/l stents has occasional dysuria and was treated for UTI outside  again has hematuria and dysuria but last urine cx was negative repeat u/a and urine cx f/u with urology  HTN, follows with medicine and medications were just adjusted but still has elevated BP, will follow with medicine  HCM:   s/p flu and COVID vaccine s/p prevnar 2022, pneumovax 2023 f/u with dental and ophthalmology

## 2023-08-10 NOTE — PHYSICAL EXAM
[General Appearance - Alert] : alert [General Appearance - In No Acute Distress] : in no acute distress [Sclera] : the sclera and conjunctiva were normal [Outer Ear] : the ears and nose were normal in appearance [Neck Appearance] : the appearance of the neck was normal [Auscultation Breath Sounds / Voice Sounds] : lungs were clear to auscultation bilaterally [Heart Sounds] : normal S1 and S2 [Edema] : there was no peripheral edema [Bowel Sounds] : normal bowel sounds [Abdomen Soft] : soft [Costovertebral Angle Tenderness] : no CVA tenderness [FreeTextEntry1] : ostomy  EMT/paramedic [No Palpable Adenopathy] : no palpable adenopathy [Musculoskeletal - Swelling] : no joint swelling [] : no rash [No Focal Deficits] : no focal deficits [Affect] : the affect was normal

## 2023-08-11 LAB
CULTURE RESULTS: NO GROWTH — SIGNIFICANT CHANGE UP
HIV-1 VIRAL LOAD RESULT: SIGNIFICANT CHANGE UP
HIV1 RNA # SERPL NAA+PROBE: SIGNIFICANT CHANGE UP COPIES/ML
HIV1 RNA SER-IMP: SIGNIFICANT CHANGE UP
HIV1 RNA SERPL NAA+PROBE-ACNC: SIGNIFICANT CHANGE UP
HIV1 RNA SERPL NAA+PROBE-LOG#: SIGNIFICANT CHANGE UP LG COP/ML
SPECIMEN SOURCE: SIGNIFICANT CHANGE UP

## 2023-08-15 ENCOUNTER — NON-APPOINTMENT (OUTPATIENT)
Age: 58
End: 2023-08-15

## 2023-08-15 ENCOUNTER — APPOINTMENT (OUTPATIENT)
Dept: PAIN MANAGEMENT | Facility: CLINIC | Age: 58
End: 2023-08-15
Payer: MEDICAID

## 2023-08-15 VITALS
OXYGEN SATURATION: 96 % | TEMPERATURE: 97.8 F | HEART RATE: 99 BPM | HEIGHT: 70 IN | DIASTOLIC BLOOD PRESSURE: 88 MMHG | RESPIRATION RATE: 17 BRPM | WEIGHT: 208 LBS | SYSTOLIC BLOOD PRESSURE: 129 MMHG | BODY MASS INDEX: 29.78 KG/M2

## 2023-08-15 PROCEDURE — 99203 OFFICE O/P NEW LOW 30 MIN: CPT

## 2023-08-15 NOTE — ADDENDUM
[FreeTextEntry1] : I spent 30 minutes in patient care time today, consisting of data review, patient examination, and discussion of plan of care with the patient.  Nick Reyes M.D., M.A.

## 2023-08-15 NOTE — PHYSICAL EXAM
[Normal muscle bulk without asymmetry] : normal muscle bulk without asymmetry [Normal] : Normal affect [de-identified] : No jaundice appreciated. [de-identified] : Cranial nerves grossly intact.  No focal deficits appreciated.

## 2023-08-15 NOTE — ASSESSMENT
[FreeTextEntry1] : 58 year-old male with history of anal cancer s/p surgical/chemo/radiation therapy reports he is currently considered disease-free, but with persistent anal and penile region pain which has been refractory to opioid therapy.  Plan:  1.  We discussed giving him a trial of pregabalin therapy to treat this pain, which may be secondary to radiation and might prove amenable to anti-neuropathic treatment. 2.  We also discussed the possibility of a ganglion impar block should medical therapy be unsuccessful.  He wishes to proceed with pharmaceutical therapy at this time. 3.  We will see him again in clinic in 6-8 weeks to evaluate his progress on the new medication.

## 2023-08-15 NOTE — HISTORY OF PRESENT ILLNESS
[FreeTextEntry1] : 58 year-old male with history of anal cancer s/p resection and chemo/RT presents with persistent anal and penile region pain.  He reports the pain has been refractory to opioid therapy which has been prescribed in the past.  He denies exposure to anti-neuropathic agents such as duloxetine or pregabalin.  The pain is described as 8/10 and constant in nature.  The patient reports a history of depression and is on Zyprexa per his psychiatrist. [Sharp] : sharp [Aching] : aching [Throbbing] : throbbing

## 2023-08-17 ENCOUNTER — APPOINTMENT (OUTPATIENT)
Dept: CARDIOLOGY | Facility: CLINIC | Age: 58
End: 2023-08-17

## 2023-08-17 ENCOUNTER — EMERGENCY (EMERGENCY)
Facility: HOSPITAL | Age: 58
LOS: 1 days | Discharge: HOME | End: 2023-08-17
Attending: STUDENT IN AN ORGANIZED HEALTH CARE EDUCATION/TRAINING PROGRAM | Admitting: STUDENT IN AN ORGANIZED HEALTH CARE EDUCATION/TRAINING PROGRAM
Payer: MEDICAID

## 2023-08-17 VITALS
TEMPERATURE: 98 F | SYSTOLIC BLOOD PRESSURE: 150 MMHG | RESPIRATION RATE: 17 BRPM | HEART RATE: 73 BPM | DIASTOLIC BLOOD PRESSURE: 99 MMHG | OXYGEN SATURATION: 100 %

## 2023-08-17 VITALS
HEIGHT: 71 IN | SYSTOLIC BLOOD PRESSURE: 141 MMHG | OXYGEN SATURATION: 100 % | RESPIRATION RATE: 18 BRPM | HEART RATE: 82 BPM | TEMPERATURE: 98 F | DIASTOLIC BLOOD PRESSURE: 102 MMHG

## 2023-08-17 DIAGNOSIS — K62.9 DISEASE OF ANUS AND RECTUM, UNSPECIFIED: Chronic | ICD-10-CM

## 2023-08-17 DIAGNOSIS — Z93.3 COLOSTOMY STATUS: Chronic | ICD-10-CM

## 2023-08-17 LAB
ALBUMIN SERPL ELPH-MCNC: 3.9 G/DL — SIGNIFICANT CHANGE UP (ref 3.3–5)
ALP SERPL-CCNC: 114 U/L — SIGNIFICANT CHANGE UP (ref 40–120)
ALT FLD-CCNC: 7 U/L — SIGNIFICANT CHANGE UP (ref 4–41)
ANION GAP SERPL CALC-SCNC: 7 MMOL/L — SIGNIFICANT CHANGE UP (ref 7–14)
APTT BLD: 34.1 SEC — SIGNIFICANT CHANGE UP (ref 24.5–35.6)
AST SERPL-CCNC: 16 U/L — SIGNIFICANT CHANGE UP (ref 4–40)
BASE EXCESS BLDV CALC-SCNC: 5.1 MMOL/L — HIGH (ref -2–3)
BASOPHILS # BLD AUTO: 0.04 K/UL — SIGNIFICANT CHANGE UP (ref 0–0.2)
BASOPHILS NFR BLD AUTO: 0.7 % — SIGNIFICANT CHANGE UP (ref 0–2)
BILIRUB SERPL-MCNC: <0.2 MG/DL — SIGNIFICANT CHANGE UP (ref 0.2–1.2)
BLD GP AB SCN SERPL QL: NEGATIVE — SIGNIFICANT CHANGE UP
BLOOD GAS VENOUS COMPREHENSIVE RESULT: SIGNIFICANT CHANGE UP
BUN SERPL-MCNC: 14 MG/DL — SIGNIFICANT CHANGE UP (ref 7–23)
CALCIUM SERPL-MCNC: 9.5 MG/DL — SIGNIFICANT CHANGE UP (ref 8.4–10.5)
CHLORIDE BLDV-SCNC: 105 MMOL/L — SIGNIFICANT CHANGE UP (ref 96–108)
CHLORIDE SERPL-SCNC: 105 MMOL/L — SIGNIFICANT CHANGE UP (ref 98–107)
CO2 BLDV-SCNC: 34.6 MMOL/L — HIGH (ref 22–26)
CO2 SERPL-SCNC: 29 MMOL/L — SIGNIFICANT CHANGE UP (ref 22–31)
CREAT SERPL-MCNC: 0.88 MG/DL — SIGNIFICANT CHANGE UP (ref 0.5–1.3)
EGFR: 100 ML/MIN/1.73M2 — SIGNIFICANT CHANGE UP
EOSINOPHIL # BLD AUTO: 0.06 K/UL — SIGNIFICANT CHANGE UP (ref 0–0.5)
EOSINOPHIL NFR BLD AUTO: 1.1 % — SIGNIFICANT CHANGE UP (ref 0–6)
GAS PNL BLDV: 139 MMOL/L — SIGNIFICANT CHANGE UP (ref 136–145)
GLUCOSE BLDV-MCNC: 88 MG/DL — SIGNIFICANT CHANGE UP (ref 70–99)
GLUCOSE SERPL-MCNC: 89 MG/DL — SIGNIFICANT CHANGE UP (ref 70–99)
HCO3 BLDV-SCNC: 33 MMOL/L — HIGH (ref 22–29)
HCT VFR BLD CALC: 36.9 % — LOW (ref 39–50)
HCT VFR BLDA CALC: 37 % — LOW (ref 39–51)
HGB BLD CALC-MCNC: 12.2 G/DL — LOW (ref 12.6–17.4)
HGB BLD-MCNC: 12.2 G/DL — LOW (ref 13–17)
IANC: 2.37 K/UL — SIGNIFICANT CHANGE UP (ref 1.8–7.4)
IMM GRANULOCYTES NFR BLD AUTO: 0.4 % — SIGNIFICANT CHANGE UP (ref 0–0.9)
INR BLD: 1.05 RATIO — SIGNIFICANT CHANGE UP (ref 0.85–1.18)
LACTATE BLDV-MCNC: 1.2 MMOL/L — SIGNIFICANT CHANGE UP (ref 0.5–2)
LYMPHOCYTES # BLD AUTO: 2.22 K/UL — SIGNIFICANT CHANGE UP (ref 1–3.3)
LYMPHOCYTES # BLD AUTO: 41.2 % — SIGNIFICANT CHANGE UP (ref 13–44)
MAGNESIUM SERPL-MCNC: 2.3 MG/DL — SIGNIFICANT CHANGE UP (ref 1.6–2.6)
MCHC RBC-ENTMCNC: 29.7 PG — SIGNIFICANT CHANGE UP (ref 27–34)
MCHC RBC-ENTMCNC: 33.1 GM/DL — SIGNIFICANT CHANGE UP (ref 32–36)
MCV RBC AUTO: 89.8 FL — SIGNIFICANT CHANGE UP (ref 80–100)
MONOCYTES # BLD AUTO: 0.68 K/UL — SIGNIFICANT CHANGE UP (ref 0–0.9)
MONOCYTES NFR BLD AUTO: 12.6 % — SIGNIFICANT CHANGE UP (ref 2–14)
NEUTROPHILS # BLD AUTO: 2.37 K/UL — SIGNIFICANT CHANGE UP (ref 1.8–7.4)
NEUTROPHILS NFR BLD AUTO: 44 % — SIGNIFICANT CHANGE UP (ref 43–77)
NRBC # BLD: 0 /100 WBCS — SIGNIFICANT CHANGE UP (ref 0–0)
NRBC # FLD: 0 K/UL — SIGNIFICANT CHANGE UP (ref 0–0)
PCO2 BLDV: 62 MMHG — HIGH (ref 42–55)
PH BLDV: 7.33 — SIGNIFICANT CHANGE UP (ref 7.32–7.43)
PLATELET # BLD AUTO: 442 K/UL — HIGH (ref 150–400)
PO2 BLDV: 23 MMHG — LOW (ref 25–45)
POTASSIUM BLDV-SCNC: 4.7 MMOL/L — SIGNIFICANT CHANGE UP (ref 3.5–5.1)
POTASSIUM SERPL-MCNC: 4.9 MMOL/L — SIGNIFICANT CHANGE UP (ref 3.5–5.3)
POTASSIUM SERPL-SCNC: 4.9 MMOL/L — SIGNIFICANT CHANGE UP (ref 3.5–5.3)
PROT SERPL-MCNC: 7.3 G/DL — SIGNIFICANT CHANGE UP (ref 6–8.3)
PROTHROM AB SERPL-ACNC: 11.9 SEC — SIGNIFICANT CHANGE UP (ref 9.5–13)
RBC # BLD: 4.11 M/UL — LOW (ref 4.2–5.8)
RBC # FLD: 13.1 % — SIGNIFICANT CHANGE UP (ref 10.3–14.5)
RH IG SCN BLD-IMP: NEGATIVE — SIGNIFICANT CHANGE UP
SAO2 % BLDV: 21.6 % — LOW (ref 67–88)
SODIUM SERPL-SCNC: 141 MMOL/L — SIGNIFICANT CHANGE UP (ref 135–145)
WBC # BLD: 5.39 K/UL — SIGNIFICANT CHANGE UP (ref 3.8–10.5)
WBC # FLD AUTO: 5.39 K/UL — SIGNIFICANT CHANGE UP (ref 3.8–10.5)

## 2023-08-17 PROCEDURE — 99285 EMERGENCY DEPT VISIT HI MDM: CPT

## 2023-08-17 PROCEDURE — 74177 CT ABD & PELVIS W/CONTRAST: CPT | Mod: 26,MA

## 2023-08-17 RX ORDER — MORPHINE SULFATE 50 MG/1
4 CAPSULE, EXTENDED RELEASE ORAL ONCE
Refills: 0 | Status: DISCONTINUED | OUTPATIENT
Start: 2023-08-17 | End: 2023-08-17

## 2023-08-17 RX ORDER — PIPERACILLIN AND TAZOBACTAM 4; .5 G/20ML; G/20ML
3.38 INJECTION, POWDER, LYOPHILIZED, FOR SOLUTION INTRAVENOUS ONCE
Refills: 0 | Status: COMPLETED | OUTPATIENT
Start: 2023-08-17 | End: 2023-08-17

## 2023-08-17 RX ORDER — CIPROFLOXACIN LACTATE 400MG/40ML
1 VIAL (ML) INTRAVENOUS
Qty: 10 | Refills: 0
Start: 2023-08-17 | End: 2023-08-21

## 2023-08-17 RX ADMIN — PIPERACILLIN AND TAZOBACTAM 200 GRAM(S): 4; .5 INJECTION, POWDER, LYOPHILIZED, FOR SOLUTION INTRAVENOUS at 15:27

## 2023-08-17 RX ADMIN — MORPHINE SULFATE 4 MILLIGRAM(S): 50 CAPSULE, EXTENDED RELEASE ORAL at 12:36

## 2023-08-17 NOTE — ED PROVIDER NOTE - NSFOLLOWUPINSTRUCTIONS_ED_ALL_ED_FT
You were seen in the emergency department and had an abscess near your rectum drained.    Antibiotics have been prescribed to you, please take them as indicated on the bottle.     Follow up with Dr. Betancourt in 5 days. Your wound was also packed. This packing might fall out on its own. This is ok. Please keep the area around the rectum as clean and dry as possible.     Please return to the emergency department with any new or worsening symptoms, including but not limited to:  -Severe pain  -Worsening rectal bleeding  -High fevers  -Nausea and vomiting  -Chest pain  -Shortness of breath    Abscess    WHAT YOU NEED TO KNOW:  A warm compress may help your abscess drain. Your healthcare provider may make a cut in the abscess so it can drain. You may need surgery to remove an abscess that is on your hands or buttocks.    DISCHARGE INSTRUCTIONS:  Return to the emergency department if:  The area around your abscess becomes very painful, warm, or has red streaks.  You have a fever and chills.  Your heart is beating faster than usual.  You feel faint or confused.  Contact your healthcare provider if:  Your abscess gets bigger or does not get better.  Your abscess returns.  You have questions or concerns about your condition or care.  Medicines: You may need any of the following:  Antibiotics help treat a bacterial infection.  Acetaminophen decreases pain and fever. It is available without a doctor's order. Ask how much to take and how often to take it. Follow directions. Acetaminophen can cause liver damage if not taken correctly.  NSAIDs, such as ibuprofen, help decrease swelling, pain, and fever. This medicine is available with or without a doctor's order. NSAIDs can cause stomach bleeding or kidney problems in certain people. If you take blood thinner medicine, always ask your healthcare provider if NSAIDs are safe for you. Always read the medicine label and follow directions.  Take your medicine as directed. Contact your healthcare provider if you think your medicine is not helping or if you have side effects. Tell him or her if you are allergic to any medicine. Keep a list of the medicines, vitamins, and herbs you take. Include the amounts, and when and why you take them. Bring the list or the pill bottles to follow-up visits. Carry your medicine list with you in case of an emergency.  Self-care:  Apply a warm compress to your abscess. This will help it open and drain. Wet a washcloth in warm, but not hot, water. Apply the compress for 10 minutes. Repeat this 4 times each day. Do not press on an abscess or try to open it with a needle. You may push the bacteria deeper or into your blood.  Do not share your clothes, towels, or sheets with anyone. This can spread the infection to others.  Wash your hands often. This can help prevent the spread of germs. Use soap and water or an alcohol-based hand rub.  Care for your wound after it is drained:  Care for your wound as directed. If your healthcare provider says it is okay, carefully remove the bandage and gauze packing. You may need to soak the gauze to get it out of your wound. Clean your wound and the area around it as directed. Dry the area and put on new, clean bandages. Change your bandages when they get wet or dirty.  Ask your healthcare provider how to change the gauze in your wound. Keep track of how many pieces of gauze are placed inside the wound. Do not put too much packing in the wound. Do not pack the gauze too tightly in your wound.  Follow up with your healthcare provider in 1 to 3 days: You may need to have your packing removed or your bandage changed. Write down your questions so you remember to ask them during your visits.

## 2023-08-17 NOTE — ED PROVIDER NOTE - ATTENDING CONTRIBUTION TO CARE
57 y/o M with pmh of HTN, HLD, anxiety/depression, HIV on Biktarvy prostate s/p radiation (2016), anal ca s/p radiation c/b rectal perforation s/p abdominal peritoneal resection with end colostomy and flap placed by plastics, radiation cystitis and ureteral strictures s/p ureteral stents pw rectal pain and bleeding. called his plastic surgeon and was told to go to ed for eval, no fevers or chills, has chronic abd pain   on exam rectal with tenderness and fluctuance, concern for abscess, will get CT labs, pain control and reassess

## 2023-08-17 NOTE — ED ADULT NURSE NOTE - OBJECTIVE STATEMENT
A&Ox4. ambulatory. c/o anal pain and bleeding from rectum. NAD. pt denies SOB, chest pain, dizziness, weakness, urinary symptoms, HA, n/v/d, fevers, chills. respirations are even and un labored. ABD is soft and non tender. skin intact. IV placed. awaiting provider orders. safety precautions maintained. call bell at bedside.

## 2023-08-17 NOTE — ED PROVIDER NOTE - PHYSICAL EXAMINATION
GENERAL: Awake, alert, NAD  HEENT: NC/AT, moist mucous membranes, PERRL, EOMI  LUNGS: CTAB, no wheezes or crackles   CARDIAC: RRR, no m/r/g  ABDOMEN: Soft, normal BS, non tender, non distended, no rebound, no guarding  RECTAL: Chaperoned by Dr. Gibson - fluctuance and pain to perineum, R>L, no overlying cellulitis, no brisk rectal bleeding  BACK: No midline spinal tenderness, no CVA tenderness  EXT: No edema, no calf tenderness, 2+ DP pulses bilaterally, no deformities.  NEURO: A&Ox3. Moving all extremities.  SKIN: Warm and dry. No rash.  PSYCH: Normal affect.

## 2023-08-17 NOTE — ED ADULT TRIAGE NOTE - CHIEF COMPLAINT QUOTE
c/o pain and bleeding in rectal area reports rectum was closed  due to  ostomy use,  hx of prostate CA and anal CA not on chemo or radiation, HIV, HTN, lung CA

## 2023-08-17 NOTE — ED PROVIDER NOTE - CARE PLAN
1 Principal Discharge DX:	Rectal pain  Secondary Diagnosis:	Rectal bleeding  Secondary Diagnosis:	Rectal abscess

## 2023-08-17 NOTE — ED PROVIDER NOTE - OBJECTIVE STATEMENT
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  He called his surgeon who told him to come to the ED for further evaluation.  Recently saw his infectious disease doctor, has undetectable viral load and normal CD4 counts.  Denies fever/chills, dysuria, hematuria, nausea/vomiting/diarrhea, cough, chest pain, shortness of breath.

## 2023-08-17 NOTE — ED PROVIDER NOTE - NS ED ROS FT
CONST: no fevers, no chills  EYES: no pain, no vision changes  ENT: no sore throat, no ear pain, no change in hearing  CV: no chest pain, no leg swelling  RESP: no shortness of breath, no cough  ABD: no abdominal pain, no nausea, no vomiting, no diarrhea, +rectal pain, +rectal bleeding  : no dysuria, no flank pain, no hematuria  MSK: no back pain, no extremity pain  NEURO: no headache or additional neurologic complaints  HEME: no easy bleeding  SKIN:  no rash

## 2023-08-17 NOTE — ED ADULT TRIAGE NOTE - PAIN: PRESENCE, MLM
CC:  Winnie VELÁSQUEZ Trip is here today for Physical (Pap 7/10/2020)    Medications: medications verified and updated  Added preferred pharmacy  Refills needed today?  NO  denies Latex allergy or sensitivity  Health Maintenance Due   Topic Date Due   • Medicare Wellness Visit  Never done   • Pneumococcal Vaccine 65+ (2 of 2 - PPSV23) 07/10/2021       Patient is due for the topics as listed above and wishes to proceed with them. Orders placed for           Patient would like communication of their results via:        Cell Phone:   Telephone Information:   Mobile [phone]     Okay to leave a message containing results? Yes                                complains of pain/discomfort

## 2023-08-17 NOTE — ED ADULT NURSE NOTE - NSFALLUNIVINTERV_ED_ALL_ED
Bed/Stretcher in lowest position, wheels locked, appropriate side rails in place/Call bell, personal items and telephone in reach/Instruct patient to call for assistance before getting out of bed/chair/stretcher/Non-slip footwear applied when patient is off stretcher/[place] to call system/Physically safe environment - no spills, clutter or unnecessary equipment/Purposeful proactive rounding/Room/bathroom lighting operational, light cord in reach

## 2023-08-17 NOTE — CONSULT NOTE ADULT - ASSESSMENT
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 he states this started on Saturday and has been increasing in pain the past several days. At one point was bleeding according to wife. Recently saw his infectious disease doctor, has undetectable viral load and normal CD4 counts.  Denies fever/chills, dysuria, hematuria, nausea/vomiting/diarrhea, cough, chest pain, shortness of breath. In ED, patient AVSS, WBC WNL, CT scan shows sperifical collection. CRS asking if plastics has objection to abscess drainage    Plan  - no objection to CRS draining abscess given proximity from flap  - no further PRS intervention  - mgmnt per CRS    d/w attending Dr. Keely Rutledge MD  Plastic and Reconstructive Surgery, PGY3  Available on [employer] Teams  [facility]: [id number], [facility]: [phone]

## 2023-08-17 NOTE — ED PROVIDER NOTE - CLINICAL SUMMARY MEDICAL DECISION MAKING FREE TEXT BOX
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 Hemodynamically stable, afebrile, non toxic appearing. Rectal exam without brisk bleeding, concern for perineal abscess. Will get labs, CT A/P, pain control. +/- surgery consult. Dispo per findings.

## 2023-08-17 NOTE — CONSULT NOTE ADULT - SUBJECTIVE AND OBJECTIVE BOX
Plastic Surgery Consult Note  (pg [facility]: [id number], [facility]: [phone])    HPI:    58M, s/p APR with VRAM reconstruction in 8/2022, PMH HTN, HLD, anxiety/depression, HIV on Biktarvy prostate s/p radiation (2016), anal ca s/p radiation c/b rectal perforation, radiation cystitis and ureteral strictures s/p ureteral stents presenting with rectal abscess and pain. he states this started on Saturday and has been increasing in pain the past several days. At one point was bleeding according to wife. Recently saw his infectious disease doctor, has undetectable viral load and normal CD4 counts.  Denies fever/chills, dysuria, hematuria, nausea/vomiting/diarrhea, cough, chest pain, shortness of breath. In ED, patient AVSS, WBC WNL, CT scan shows sperifical collection. CRS asking if plastics has objection to abscess drainage    PAST MEDICAL & SURGICAL HISTORY:  HTN (hypertension)      HLD (hyperlipidemia)      HIV infection      Depressive disorder      Anxiety      Prostate cancer      Anal cancer      Diverticulosis      Lung cancer      Anal lesion      S/P colostomy        Allergies    No Known Allergies    Intolerances      Home Medications:  ALPRAZolam 1 mg oral tablet: 1 tab(s) orally 3 times a day, As Needed (23 Jul 2023 06:01)  bictegravir/emtricitabine/tenofovir 50 mg-200 mg-25 mg oral tablet: 1 tab(s) orally once a day (23 Jul 2023 06:01)  DilTIAZem (Eqv-Cardizem CD) 240 mg/24 hours oral capsule, extended release: 1 cap(s) orally once a day (23 Jul 2023 06:01)  ezetimibe 10 mg oral tablet: 1 tab(s) orally once a day (23 Jul 2023 06:01)  metoprolol succinate 100 mg oral tablet, extended release: 1 tab(s) orally once a day (23 Jul 2023 06:01)  OLANZapine 10 mg oral tablet: 1 tab(s) orally once a day (at bedtime) (23 Jul 2023 06:01)  zolpidem 10 mg oral tablet: 1 tab(s) orally once a day (at bedtime) (23 Jul 2023 06:01)    MEDICATIONS  (STANDING):      SOCIAL HISTORY:  FAMILY HISTORY:  FH: prostate cancer    FH: breast cancer        ___________________________________________  OBJECTIVE:  Vital Signs Last 24 Hrs  T(C): 36.8 (17 Aug 2023 18:50), Max: 36.8 (17 Aug 2023 11:20)  T(F): 98.2 (17 Aug 2023 18:50), Max: 98.3 (17 Aug 2023 11:20)  HR: 73 (17 Aug 2023 18:50) (73 - 82)  BP: 150/99 (17 Aug 2023 18:50) (135/88 - 150/99)  BP(mean): --  RR: 17 (17 Aug 2023 18:50) (17 - 18)  SpO2: 100% (17 Aug 2023 18:50) (100% - 100%)    Parameters below as of 17 Aug 2023 18:50  Patient On (Oxygen Delivery Method): room air    CAPILLARY BLOOD GLUCOSE        I&O's Detail    General: Well developed, well nourished, NAD  Neuro: Alert and oriented, no focal deficits, moves all extremities spontaneously  HEENT: NCAT, EOMI, anicteric, mucosa moist  BUTTOCK:  tender, erythematous mass superior to flap border  mild pus expressible   flap well appearing, approrpiate capillary refill, suture line intact  ____________________________________________  LABS:  CBC Full  -  ( 17 Aug 2023 12:17 )  WBC Count : 5.39 K/uL  RBC Count : 4.11 M/uL  Hemoglobin : 12.2 g/dL  Hematocrit : 36.9 %  Platelet Count - Automated : 442 K/uL  Mean Cell Volume : 89.8 fL  Mean Cell Hemoglobin : 29.7 pg  Mean Cell Hemoglobin Concentration : 33.1 gm/dL  Auto Neutrophil # : 2.37 K/uL  Auto Lymphocyte # : 2.22 K/uL  Auto Monocyte # : 0.68 K/uL  Auto Eosinophil # : 0.06 K/uL  Auto Basophil # : 0.04 K/uL  Auto Neutrophil % : 44.0 %  Auto Lymphocyte % : 41.2 %  Auto Monocyte % : 12.6 %  Auto Eosinophil % : 1.1 %  Auto Basophil % : 0.7 %    08-17    141  |  105  |  14  ----------------------------<  89  4.9   |  29  |  0.88    Ca    9.5      17 Aug 2023 12:17  Mg     2.30     08-17    TPro  7.3  /  Alb  3.9  /  TBili  <0.2  /  DBili  x   /  AST  16  /  ALT  7   /  AlkPhos  114  08-17    LIVER FUNCTIONS - ( 17 Aug 2023 12:17 )  Alb: 3.9 g/dL / Pro: 7.3 g/dL / ALK PHOS: 114 U/L / ALT: 7 U/L / AST: 16 U/L / GGT: x           PT/INR - ( 17 Aug 2023 12:17 )   PT: 11.9 sec;   INR: 1.05 ratio         PTT - ( 17 Aug 2023 12:17 )  PTT:34.1 sec  Urinalysis Basic - ( 17 Aug 2023 12:17 )    Color: x / Appearance: x / SG: x / pH: x  Gluc: 89 mg/dL / Ketone: x  / Bili: x / Urobili: x   Blood: x / Protein: x / Nitrite: x   Leuk Esterase: x / RBC: x / WBC x   Sq Epi: x / Non Sq Epi: x / Bacteria: x            ____________________________________________  MICRO:  RECENT CULTURES:    ____________________________________________  RADIOLOGY:

## 2023-08-17 NOTE — CONSULT NOTE ADULT - SUBJECTIVE AND OBJECTIVE BOX
58 M PMH of HIV on Biktarvy and s/p diverting loop sigmoid colostomy (Dr. Avilez 03/22), abdominal perineal resection w/ pelvic drainage, colostomy takedown, resection, colostomy creation for rectal cancer with Dr. Betancourt with plastic surgery myocutaneous flap creation in August 2022. Pt presents with a 2cm perineal abscess since Sunday. Some blood and pus has drained from abscess. No fevers, chills, shortness of breath, chest pain, nausea, or vomiting.    PAST MEDICAL & SURGICAL HISTORY:  HTN (hypertension)  HLD (hyperlipidemia)  HIV infection  Depressive disorder  Anxiety  Prostate cancer  Anal cancer  Diverticulosis  Lung cancer  Anal lesion  S/P colostomy    REVIEW OF SYSTEMS  In HPI.     Allergic/Immunologic:	 No Anaphylaxis/ Intolerance/ Recent illnesses    MEDICATIONS  (STANDING):    MEDICATIONS  (PRN):    Allergies  No Known Allergies    Intolerances    SOCIAL HISTORY:  Smoking Hx: denies  Etoh Hx: denies  IVDA Hx: denies    FAMILY HISTORY:  FH: prostate cancer    FH: breast cancer  unless noted, no significant family hx with Mother, Father, Siblings    Vital Signs Last 24 Hrs  T(C): 36.6 (17 Aug 2023 15:31), Max: 36.8 (17 Aug 2023 11:20)  T(F): 97.9 (17 Aug 2023 15:31), Max: 98.3 (17 Aug 2023 11:20)  HR: 75 (17 Aug 2023 15:31) (75 - 82)  BP: 135/88 (17 Aug 2023 15:31) (135/88 - 141/102)  BP(mean): --  RR: 17 (17 Aug 2023 15:31) (17 - 18)  SpO2: 100% (17 Aug 2023 15:31) (100% - 100%)    Parameters below as of 17 Aug 2023 15:31  Patient On (Oxygen Delivery Method): room air    General:  NAD  Neurology: Awake, VASQUEZ x 4  Eyes: Gross vision intact  ENT: Gross hearing intact  Neck: Neck supple  Respiratory: CTA B/L  CV: RRR, S1S2  Abdominal: Soft, NT, ND   Perineum: small 2 cm abscess just lateral to skin flap, erythema, pus present    LABS:                        12.2   5.39  )-----------( 442      ( 17 Aug 2023 12:17 )             36.9     08-17    141  |  105  |  14  ----------------------------<  89  4.9   |  29  |  0.88    Ca    9.5      17 Aug 2023 12:17  Mg     2.30     08-17    TPro  7.3  /  Alb  3.9  /  TBili  <0.2  /  DBili  x   /  AST  16  /  ALT  7   /  AlkPhos  114  08-17    PT/INR - ( 17 Aug 2023 12:17 )   PT: 11.9 sec;   INR: 1.05 ratio         PTT - ( 17 Aug 2023 12:17 )  PTT:34.1 sec  Urinalysis Basic - ( 17 Aug 2023 12:17 )    Color: x / Appearance: x / SG: x / pH: x  Gluc: 89 mg/dL / Ketone: x  / Bili: x / Urobili: x   Blood: x / Protein: x / Nitrite: x   Leuk Esterase: x / RBC: x / WBC x   Sq Epi: x / Non Sq Epi: x / Bacteria: x    RADIOLOGY & ADDITIONAL STUDIES:  ACC: [id number] EXAM:  CT ABDOMEN AND PELVIS IC   ORDERED BY: MIRYAM DURAN     PROCEDURE DATE:  08/17/2023      INTERPRETATION:  CLINICAL INFORMATION: Rectal pain and bleeding. Concern   for abscess.    COMPARISON: CT abdomen and pelvis 5/29/2023.    CONTRAST/COMPLICATIONS:  IV Contrast: Omnipaque 350  90 cc administered   10 cc discarded  Oral Contrast: NONE  Complications: None reported at time of study completion    PROCEDURE:  CT of the Abdomen and Pelvis was performed.  Sagittal and coronal reformats were performed.    FINDINGS:  LOWER CHEST: Within normal limits.    LIVER: Within normal limits.  BILE DUCTS: Normal caliber.  GALLBLADDER: Within normal limits.  SPLEEN: Within normal limits.  PANCREAS: Within normal limits.  ADRENALS: Within normal limits.  KIDNEYS/URETERS: Bilateral renal cysts. Bilateral double-J ureteral   stents in place, both proximal coils located in their respective renal   pelvises and both distal coils terminating within the urinary bladder.   Unchanged moderate to severe bilateral hydronephrosis. Unchanged   bilateral urothelial thickening and surrounding inflammatory changes   involving the collecting systems and ureters.    BLADDER: Within normal limits.  REPRODUCTIVE ORGANS: Prostate within normal limits.    BOWEL: Status post abdominoperineal resection. Left abdominal colostomy.   Moderate colonic stool burden. No bowel obstruction.  PERITONEUM: No ascites.  VESSELS: IVC filter. Mild atherosclerotic changes.  RETROPERITONEUM/LYMPH NODES: No lymphadenopathy.  ABDOMINAL WALL: New small loculated collection measuring 2.4 x 1.8 x 1.7   cm within the abdominoperineal resection operative bed, in the inferior   intergluteal cleft immediately inferior to the coccyx and approximately   1.0 cm deep to the skin surface (series 601 image 94, series 301 image   122). Postsurgical changes in the ventral abdominal wall. Left abdominal   colostomy. Unchanged nonspecific nodular focus of infiltration in the   right buttock subcutaneous soft tissue (series 301 image 109).  BONES: Degenerative changes.    IMPRESSION:  New small collection measuring 2.4 cm in the inferior aspect of the   intergluteal cleft immediately inferior to the coccyx, which may   represent abscess.    Unchanged moderate to severe bilateral hydronephrosis despite the   presence of bilateral ureteral stents. Unchanged urothelial thickening   and surrounding inflammatory changes involving the renal collecting   systems and ureters, which could be due to reactive inflammation or an   ascending urinary tract infection.    --- End of Report ---    TIM CONNER MD; Attending Radiologist  This document has been electronically signed. Aug 17 2023  2:34PM     58 M PMH of HIV on Biktarvy and s/p diverting loop sigmoid colostomy (Dr. Avilez 03/22), abdominal perineal resection w/ pelvic drainage, colostomy takedown, resection, colostomy creation for rectal cancer with Dr. Betancourt with plastic surgery myocutaneous flap creation in August 2022. Pt presents with a 2cm perineal abscess since Sunday. Some blood and pus has drained from abscess. No fevers, chills, shortness of breath, chest pain, nausea, or vomiting.    PAST MEDICAL & SURGICAL HISTORY:  HTN (hypertension)  HLD (hyperlipidemia)  HIV infection  Depressive disorder  Anxiety  Prostate cancer  Anal cancer  Diverticulosis  Lung cancer  Anal lesion  S/P colostomy    REVIEW OF SYSTEMS  In HPI.     Allergic/Immunologic:	 No Anaphylaxis/ Intolerance/ Recent illnesses    MEDICATIONS  (STANDING):    MEDICATIONS  (PRN):    Allergies  No Known Allergies    Intolerances    SOCIAL HISTORY:  Smoking Hx: denies  Etoh Hx: denies  IVDA Hx: denies    FAMILY HISTORY:  FH: prostate cancer    FH: breast cancer  unless noted, no significant family hx with Mother, Father, Siblings    Vital Signs Last 24 Hrs  T(C): 36.6 (17 Aug 2023 15:31), Max: 36.8 (17 Aug 2023 11:20)  T(F): 97.9 (17 Aug 2023 15:31), Max: 98.3 (17 Aug 2023 11:20)  HR: 75 (17 Aug 2023 15:31) (75 - 82)  BP: 135/88 (17 Aug 2023 15:31) (135/88 - 141/102)  BP(mean): --  RR: 17 (17 Aug 2023 15:31) (17 - 18)  SpO2: 100% (17 Aug 2023 15:31) (100% - 100%)    Parameters below as of 17 Aug 2023 15:31  Patient On (Oxygen Delivery Method): room air    General:  NAD  Neurology: Awake, VASQUEZ x 4  Eyes: Gross vision intact  ENT: Gross hearing intact  Neck: Neck supple  Respiratory: CTA B/L  CV: RRR, S1S2  Abdominal: Soft, NT, ND, colostomy  Perineum: small 2 cm abscess just lateral to skin flap, erythema, pus present    LABS:                        12.2   5.39  )-----------( 442      ( 17 Aug 2023 12:17 )             36.9     08-17    141  |  105  |  14  ----------------------------<  89  4.9   |  29  |  0.88    Ca    9.5      17 Aug 2023 12:17  Mg     2.30     08-17    TPro  7.3  /  Alb  3.9  /  TBili  <0.2  /  DBili  x   /  AST  16  /  ALT  7   /  AlkPhos  114  08-17    PT/INR - ( 17 Aug 2023 12:17 )   PT: 11.9 sec;   INR: 1.05 ratio         PTT - ( 17 Aug 2023 12:17 )  PTT:34.1 sec  Urinalysis Basic - ( 17 Aug 2023 12:17 )    Color: x / Appearance: x / SG: x / pH: x  Gluc: 89 mg/dL / Ketone: x  / Bili: x / Urobili: x   Blood: x / Protein: x / Nitrite: x   Leuk Esterase: x / RBC: x / WBC x   Sq Epi: x / Non Sq Epi: x / Bacteria: x    RADIOLOGY & ADDITIONAL STUDIES:  ACC: [id number] EXAM:  CT ABDOMEN AND PELVIS IC   ORDERED BY: MIRYAM DURAN     PROCEDURE DATE:  08/17/2023      INTERPRETATION:  CLINICAL INFORMATION: Rectal pain and bleeding. Concern   for abscess.    COMPARISON: CT abdomen and pelvis 5/29/2023.    CONTRAST/COMPLICATIONS:  IV Contrast: Omnipaque 350  90 cc administered   10 cc discarded  Oral Contrast: NONE  Complications: None reported at time of study completion    PROCEDURE:  CT of the Abdomen and Pelvis was performed.  Sagittal and coronal reformats were performed.    FINDINGS:  LOWER CHEST: Within normal limits.    LIVER: Within normal limits.  BILE DUCTS: Normal caliber.  GALLBLADDER: Within normal limits.  SPLEEN: Within normal limits.  PANCREAS: Within normal limits.  ADRENALS: Within normal limits.  KIDNEYS/URETERS: Bilateral renal cysts. Bilateral double-J ureteral   stents in place, both proximal coils located in their respective renal   pelvises and both distal coils terminating within the urinary bladder.   Unchanged moderate to severe bilateral hydronephrosis. Unchanged   bilateral urothelial thickening and surrounding inflammatory changes   involving the collecting systems and ureters.    BLADDER: Within normal limits.  REPRODUCTIVE ORGANS: Prostate within normal limits.    BOWEL: Status post abdominoperineal resection. Left abdominal colostomy.   Moderate colonic stool burden. No bowel obstruction.  PERITONEUM: No ascites.  VESSELS: IVC filter. Mild atherosclerotic changes.  RETROPERITONEUM/LYMPH NODES: No lymphadenopathy.  ABDOMINAL WALL: New small loculated collection measuring 2.4 x 1.8 x 1.7   cm within the abdominoperineal resection operative bed, in the inferior   intergluteal cleft immediately inferior to the coccyx and approximately   1.0 cm deep to the skin surface (series 601 image 94, series 301 image   122). Postsurgical changes in the ventral abdominal wall. Left abdominal   colostomy. Unchanged nonspecific nodular focus of infiltration in the   right buttock subcutaneous soft tissue (series 301 image 109).  BONES: Degenerative changes.    IMPRESSION:  New small collection measuring 2.4 cm in the inferior aspect of the   intergluteal cleft immediately inferior to the coccyx, which may   represent abscess.    Unchanged moderate to severe bilateral hydronephrosis despite the   presence of bilateral ureteral stents. Unchanged urothelial thickening   and surrounding inflammatory changes involving the renal collecting   systems and ureters, which could be due to reactive inflammation or an   ascending urinary tract infection.    --- End of Report ---    TIM CONNER MD; Attending Radiologist  This document has been electronically signed. Aug 17 2023  2:34PM

## 2023-08-17 NOTE — ED PROVIDER NOTE - PROGRESS NOTE DETAILS
Megan Zaldivar PGY-3: CT with new 2.4cm collection near coccyx. Venkat almeida. Megan Zaldivar PGY-3: CT with new 2.4cm collection near coccyx. Surgery paged. Megan Zaldivar PGY-3: surgery to see patient. Megan Zaldivar PGY-3: Surgery to bedside I&D patient. To send wound culture, dc on abx.

## 2023-08-17 NOTE — ED PROVIDER NOTE - PATIENT PORTAL LINK FT
You can access the FollowMyHealth Patient Portal offered by [facility] by registering at the following website: [email or web address]. By joining [employer]’s FollowMyHealth portal, you will also be able to view your health information using other applications (apps) compatible with our system.

## 2023-08-21 ENCOUNTER — RX RENEWAL (OUTPATIENT)
Age: 58
End: 2023-08-21

## 2023-08-23 LAB
CULTURE RESULTS: SIGNIFICANT CHANGE UP
SPECIMEN SOURCE: SIGNIFICANT CHANGE UP

## 2023-08-24 ENCOUNTER — APPOINTMENT (OUTPATIENT)
Dept: PLASTIC SURGERY | Facility: CLINIC | Age: 58
End: 2023-08-24
Payer: MEDICAID

## 2023-08-24 ENCOUNTER — RX RENEWAL (OUTPATIENT)
Age: 58
End: 2023-08-24

## 2023-08-24 VITALS
DIASTOLIC BLOOD PRESSURE: 106 MMHG | HEART RATE: 73 BPM | WEIGHT: 204 LBS | OXYGEN SATURATION: 98 % | TEMPERATURE: 97.2 F | BODY MASS INDEX: 28.56 KG/M2 | HEIGHT: 71 IN | SYSTOLIC BLOOD PRESSURE: 160 MMHG

## 2023-08-24 DIAGNOSIS — K62.89 OTHER SPECIFIED DISEASES OF ANUS AND RECTUM: ICD-10-CM

## 2023-08-24 PROCEDURE — 99213 OFFICE O/P EST LOW 20 MIN: CPT

## 2023-08-29 PROBLEM — K62.89 ANAL PAIN: Status: ACTIVE | Noted: 2021-11-18

## 2023-08-30 NOTE — PROGRESS NOTE ADULT - NSPROGADDITIONALINFOA_GEN_ALL_CORE
3/24/22 @ 2:50 pm -  I spoke with patient's wife and informed her that pt will be going for surgery tomorrow to drain abscess with colorectal surgery. I also informed her that he will likely be ready for discharge either later that day or over the weekend after the procedure. She expressed concern that he has difficulty walking due to the pain from the abscess. Pt was seen by PT on 3/18 and rec home PT. Will have PT re-evaluate him postop to see if any needs. I also informed her that we will transition his IV pain meds to PO tomorrow Assistance OOB with selected safe patient handling equipment if applicable/Assistance with ambulation/Communicate fall risk and risk factors to all staff, patient, and family/Monitor gait and stability/Provide visual cue: yellow wristband, yellow gown, etc/Reinforce activity limits and safety measures with patient and family/Call bell, personal items and telephone in reach/Instruct patient to call for assistance before getting out of bed/chair/stretcher/Non-slip footwear applied when patient is off stretcher/[place] to call system/Physically safe environment - no spills, clutter or unnecessary equipment/Purposeful Proactive Rounding/Room/bathroom lighting operational, light cord in reach

## 2023-08-31 ENCOUNTER — APPOINTMENT (OUTPATIENT)
Dept: GASTROENTEROLOGY | Facility: CLINIC | Age: 58
End: 2023-08-31
Payer: MEDICAID

## 2023-08-31 VITALS
WEIGHT: 210 LBS | BODY MASS INDEX: 29.29 KG/M2 | DIASTOLIC BLOOD PRESSURE: 90 MMHG | HEART RATE: 78 BPM | OXYGEN SATURATION: 99 % | SYSTOLIC BLOOD PRESSURE: 137 MMHG

## 2023-08-31 PROCEDURE — 99214 OFFICE O/P EST MOD 30 MIN: CPT

## 2023-08-31 NOTE — PHYSICAL EXAM
[Alert] : alert [Normal Voice/Communication] : normal voice/communication [Healthy Appearing] : healthy appearing [No Acute Distress] : no acute distress [Sclera] : the sclera and conjunctiva were normal [Hearing Threshold Finger Rub Not [place]] : hearing was normal [Normal Lips/Gums] : the lips and gums were normal [Oropharynx] : the oropharynx was normal [Normal Appearance] : the appearance of the neck was normal [No Neck Mass] : no neck mass was observed [No Respiratory Distress] : no respiratory distress [No Acc Muscle Use] : no accessory muscle use [Respiration, Rhythm And Depth] : normal respiratory rhythm and effort [Auscultation Breath Sounds / Voice Sounds] : lungs were clear to auscultation bilaterally [Heart Rate And Rhythm] : heart rate was normal and rhythm regular [Normal S1, S2] : normal S1 and S2 [Murmurs] : no murmurs [Bowel Sounds] : normal bowel sounds [No Masses] : no abdominal mass palpated [Abdomen Soft] : soft [] : no hepatosplenomegaly [Other: ___] : [unfilled] [Oriented To Time, Place, And Person] : oriented to person, place, and time

## 2023-08-31 NOTE — ASSESSMENT
[FreeTextEntry1] : A low acid / reflux diet was discussed in great detail including not smoking, not drinking alcohol, and not consuming foods that irritate the esophagus. It is helpful to eat small meals throughout the day instead of large meals. You should avoid eating before bedtime or lying down after you eat. It can be helpful to raise the head of your bed six inches. Additionally, you should maintain a healthy weight and good posture.. The patient was given written material to take home and review.  Patient will have EGD as per Dr Brunner recommendations. Patient will require PSTs prior to procedure.  The risks benefits alternatives and complications of the procedure/s were explained to the patient at length. The patient was agreeable and we will proceed.   Patient and wife both advised if abdominal pain persists and EGD is negative will need second opinion and will be referred to another GI specialist.  Patient will be started on Famotidine 40 mg PO BID. Medication reviewed side effects and adverse reactions.  Patient and wife both verbalized understanding of all information provided. All questions answered and reviewed.  I spent 45 minutes with the patient as well as reviewing documents prior to and after the office visit

## 2023-08-31 NOTE — HISTORY OF PRESENT ILLNESS
[FreeTextEntry1] : 58 m with HTN, HIV diagnosed 1992 on Biktarvy, prostate ca 2016 s/p radiation, anal cancer (dx 4/2021) s/p radiation and chemotherapy (last 8/2021) complicated by an ulcer, abscess and rectal bleeding  s/p loop colostomy 3/30/22 and then s/p APR with end colostomy creation, VRAM flap closure, 8/24 found to have a large abscess as well, abscess cx with clostridium, bacteroides  again was admitted for GIB and pelvic/groin abscess s/p drain placement and CT also showed L ischial tuberosity osteo so pt received zosyn in the hospital and was discharged with PO levo and flagyl to finish a 6 week course  biktarvy was held during the hospitalization for some time in view of OR and GIB but now compliant  he was admitted 11/2022 with hematuria and hydro, urine cx and AFB were negative, s/p b/l nephrostomy, and then had laser lithotripsy and stent placement 1/25/23, the nephrostomies were clamped  pt is doing well, no fever, chills, nausea, vomiting but has some dysuria    7/12/23- 57M with HIV (VL undetectable, CD4 499), HLD, HTN, diverticulosis,  rectal/prostate cancer s/p radiation with perforation of recum/anus s/p  abdominoperitoneal resection with end colostomy, left groin abscess s/p IR  drain (Sept 2022), 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    Pt had a CT which was significant for worsening hydronephrosis and testicular  US consistent with orchitis. Urology and IR were consulted for evaluation and  management of hydronephrosis. Pt is s/p b/l ureteral stent exchange with  urology on 6/1 without complication.    UA was consistent with pyelonephritis, and urine culture had no growth to date.  Pt was started on CTX while inpatient and was transitioned to oral levaquin to  complete a 7 day course.   Pt was found to have tachycardia in the setting of holding home metoprolol and  cardizem. Metoprolol was resumed and HR improved    7/12/23  saw plastic surgery regarding his perineum. He is s/p resection of anal lesions with VRAM flap reconstruction of perineal and anal wound DOS: 8/24/22, s/p XRT.  Patient was seen at [facility] ED july for onset of bleeding and pain from his prior surgical site. An abscess was found over the area, which was drained. He was started on IV Cipro at the ED and was d/c with a 10 day course of PO Cipro, which he has completed. Today, patient reports the pain from the abscess is much improved, but he c/o ongoing pain more "inside" the flap, especially when sitting. He is unable to sit for long periods of time and states that the area feels hard sometimes. He has been sitting on pillows. Patient has an appointment w/ GI next week to schedule an endoscopy. He has also been having urinary incontinence. Pt describes numbness of his lower extremities. Otherwise no other complaints or concerns; denies fever, sweats, or chills.   Patient states he continues to have abdominal pain despite taking Pantoprazole as prescribed daily. Pain is consistent and in the middle of the abdomen. Patient has been seen by his surgeon Dr Betancourt who states abdominal pain is not related to surgery. He was advised to f/u with GI. Patient also has intermittent espisodes of constipation but has improved slightly.

## 2023-09-06 ENCOUNTER — APPOINTMENT (OUTPATIENT)
Dept: INTERNAL MEDICINE | Facility: CLINIC | Age: 58
End: 2023-09-06

## 2023-09-13 ENCOUNTER — EMERGENCY (EMERGENCY)
Facility: HOSPITAL | Age: 58
LOS: 1 days | Discharge: HOME | End: 2023-09-13
Attending: EMERGENCY MEDICINE | Admitting: EMERGENCY MEDICINE
Payer: MEDICAID

## 2023-09-13 VITALS
TEMPERATURE: 99 F | HEIGHT: 71 IN | DIASTOLIC BLOOD PRESSURE: 107 MMHG | OXYGEN SATURATION: 96 % | RESPIRATION RATE: 18 BRPM | SYSTOLIC BLOOD PRESSURE: 146 MMHG | HEART RATE: 117 BPM

## 2023-09-13 DIAGNOSIS — Z93.3 COLOSTOMY STATUS: Chronic | ICD-10-CM

## 2023-09-13 DIAGNOSIS — K62.9 DISEASE OF ANUS AND RECTUM, UNSPECIFIED: Chronic | ICD-10-CM

## 2023-09-13 LAB
ALBUMIN SERPL ELPH-MCNC: 4.2 G/DL — SIGNIFICANT CHANGE UP (ref 3.3–5)
ALP SERPL-CCNC: 103 U/L — SIGNIFICANT CHANGE UP (ref 40–120)
ALT FLD-CCNC: 8 U/L — SIGNIFICANT CHANGE UP (ref 4–41)
ANION GAP SERPL CALC-SCNC: 9 MMOL/L — SIGNIFICANT CHANGE UP (ref 7–14)
APPEARANCE UR: ABNORMAL
AST SERPL-CCNC: 13 U/L — SIGNIFICANT CHANGE UP (ref 4–40)
B PERT DNA SPEC QL NAA+PROBE: SIGNIFICANT CHANGE UP
B PERT+PARAPERT DNA PNL SPEC NAA+PROBE: SIGNIFICANT CHANGE UP
BASOPHILS # BLD AUTO: 0.03 K/UL — SIGNIFICANT CHANGE UP (ref 0–0.2)
BASOPHILS NFR BLD AUTO: 0.3 % — SIGNIFICANT CHANGE UP (ref 0–2)
BILIRUB SERPL-MCNC: 0.5 MG/DL — SIGNIFICANT CHANGE UP (ref 0.2–1.2)
BILIRUB UR-MCNC: NEGATIVE — SIGNIFICANT CHANGE UP
BORDETELLA PARAPERTUSSIS (RAPRVP): SIGNIFICANT CHANGE UP
BUN SERPL-MCNC: 14 MG/DL — SIGNIFICANT CHANGE UP (ref 7–23)
C PNEUM DNA SPEC QL NAA+PROBE: SIGNIFICANT CHANGE UP
CALCIUM SERPL-MCNC: 9.5 MG/DL — SIGNIFICANT CHANGE UP (ref 8.4–10.5)
CHLORIDE SERPL-SCNC: 104 MMOL/L — SIGNIFICANT CHANGE UP (ref 98–107)
CO2 SERPL-SCNC: 28 MMOL/L — SIGNIFICANT CHANGE UP (ref 22–31)
COLOR SPEC: ABNORMAL
CREAT SERPL-MCNC: 0.85 MG/DL — SIGNIFICANT CHANGE UP (ref 0.5–1.3)
DIFF PNL FLD: ABNORMAL
EGFR: 101 ML/MIN/1.73M2 — SIGNIFICANT CHANGE UP
EOSINOPHIL # BLD AUTO: 0 K/UL — SIGNIFICANT CHANGE UP (ref 0–0.5)
EOSINOPHIL NFR BLD AUTO: 0 % — SIGNIFICANT CHANGE UP (ref 0–6)
FLUAV SUBTYP SPEC NAA+PROBE: SIGNIFICANT CHANGE UP
FLUBV RNA SPEC QL NAA+PROBE: SIGNIFICANT CHANGE UP
GAS PNL BLDV: SIGNIFICANT CHANGE UP
GLUCOSE SERPL-MCNC: 126 MG/DL — HIGH (ref 70–99)
GLUCOSE UR QL: NEGATIVE MG/DL — SIGNIFICANT CHANGE UP
HADV DNA SPEC QL NAA+PROBE: SIGNIFICANT CHANGE UP
HCOV 229E RNA SPEC QL NAA+PROBE: SIGNIFICANT CHANGE UP
HCOV HKU1 RNA SPEC QL NAA+PROBE: SIGNIFICANT CHANGE UP
HCOV NL63 RNA SPEC QL NAA+PROBE: SIGNIFICANT CHANGE UP
HCOV OC43 RNA SPEC QL NAA+PROBE: SIGNIFICANT CHANGE UP
HCT VFR BLD CALC: 35.3 % — LOW (ref 39–50)
HGB BLD-MCNC: 11.7 G/DL — LOW (ref 13–17)
HMPV RNA SPEC QL NAA+PROBE: SIGNIFICANT CHANGE UP
HPIV1 RNA SPEC QL NAA+PROBE: SIGNIFICANT CHANGE UP
HPIV2 RNA SPEC QL NAA+PROBE: SIGNIFICANT CHANGE UP
HPIV3 RNA SPEC QL NAA+PROBE: SIGNIFICANT CHANGE UP
HPIV4 RNA SPEC QL NAA+PROBE: SIGNIFICANT CHANGE UP
IANC: 7.11 K/UL — SIGNIFICANT CHANGE UP (ref 1.8–7.4)
IMM GRANULOCYTES NFR BLD AUTO: 0.3 % — SIGNIFICANT CHANGE UP (ref 0–0.9)
KETONES UR-MCNC: NEGATIVE MG/DL — SIGNIFICANT CHANGE UP
LEUKOCYTE ESTERASE UR-ACNC: ABNORMAL
LIDOCAIN IGE QN: 14 U/L — SIGNIFICANT CHANGE UP (ref 7–60)
LYMPHOCYTES # BLD AUTO: 1.4 K/UL — SIGNIFICANT CHANGE UP (ref 1–3.3)
LYMPHOCYTES # BLD AUTO: 14.7 % — SIGNIFICANT CHANGE UP (ref 13–44)
M PNEUMO DNA SPEC QL NAA+PROBE: SIGNIFICANT CHANGE UP
MCHC RBC-ENTMCNC: 29.3 PG — SIGNIFICANT CHANGE UP (ref 27–34)
MCHC RBC-ENTMCNC: 33.1 GM/DL — SIGNIFICANT CHANGE UP (ref 32–36)
MCV RBC AUTO: 88.3 FL — SIGNIFICANT CHANGE UP (ref 80–100)
MONOCYTES # BLD AUTO: 0.96 K/UL — HIGH (ref 0–0.9)
MONOCYTES NFR BLD AUTO: 10.1 % — SIGNIFICANT CHANGE UP (ref 2–14)
NEUTROPHILS # BLD AUTO: 7.11 K/UL — SIGNIFICANT CHANGE UP (ref 1.8–7.4)
NEUTROPHILS NFR BLD AUTO: 74.6 % — SIGNIFICANT CHANGE UP (ref 43–77)
NITRITE UR-MCNC: NEGATIVE — SIGNIFICANT CHANGE UP
NRBC # BLD: 0 /100 WBCS — SIGNIFICANT CHANGE UP (ref 0–0)
NRBC # FLD: 0 K/UL — SIGNIFICANT CHANGE UP (ref 0–0)
PH UR: 7 — SIGNIFICANT CHANGE UP (ref 5–8)
PLATELET # BLD AUTO: 262 K/UL — SIGNIFICANT CHANGE UP (ref 150–400)
POTASSIUM SERPL-MCNC: 3.8 MMOL/L — SIGNIFICANT CHANGE UP (ref 3.5–5.3)
POTASSIUM SERPL-SCNC: 3.8 MMOL/L — SIGNIFICANT CHANGE UP (ref 3.5–5.3)
PROT SERPL-MCNC: 7.4 G/DL — SIGNIFICANT CHANGE UP (ref 6–8.3)
PROT UR-MCNC: 300 MG/DL
RAPID RVP RESULT: DETECTED
RBC # BLD: 4 M/UL — LOW (ref 4.2–5.8)
RBC # FLD: 13.8 % — SIGNIFICANT CHANGE UP (ref 10.3–14.5)
RSV RNA SPEC QL NAA+PROBE: SIGNIFICANT CHANGE UP
RV+EV RNA SPEC QL NAA+PROBE: DETECTED
SARS-COV-2 RNA SPEC QL NAA+PROBE: SIGNIFICANT CHANGE UP
SODIUM SERPL-SCNC: 141 MMOL/L — SIGNIFICANT CHANGE UP (ref 135–145)
SP GR SPEC: 1.02 — SIGNIFICANT CHANGE UP (ref 1–1.03)
UROBILINOGEN FLD QL: 0.2 MG/DL — SIGNIFICANT CHANGE UP (ref 0.2–1)
WBC # BLD: 9.53 K/UL — SIGNIFICANT CHANGE UP (ref 3.8–10.5)
WBC # FLD AUTO: 9.53 K/UL — SIGNIFICANT CHANGE UP (ref 3.8–10.5)

## 2023-09-13 PROCEDURE — 93010 ELECTROCARDIOGRAM REPORT: CPT

## 2023-09-13 PROCEDURE — 99285 EMERGENCY DEPT VISIT HI MDM: CPT

## 2023-09-13 PROCEDURE — 71045 X-RAY EXAM CHEST 1 VIEW: CPT | Mod: 26

## 2023-09-13 RX ORDER — MORPHINE SULFATE 50 MG/1
4 CAPSULE, EXTENDED RELEASE ORAL ONCE
Refills: 0 | Status: DISCONTINUED | OUTPATIENT
Start: 2023-09-13 | End: 2023-09-13

## 2023-09-13 RX ORDER — SODIUM CHLORIDE 9 MG/ML
1000 INJECTION INTRAMUSCULAR; INTRAVENOUS; SUBCUTANEOUS ONCE
Refills: 0 | Status: COMPLETED | OUTPATIENT
Start: 2023-09-13 | End: 2023-09-13

## 2023-09-13 RX ADMIN — SODIUM CHLORIDE 1000 MILLILITER(S): 9 INJECTION INTRAMUSCULAR; INTRAVENOUS; SUBCUTANEOUS at 20:47

## 2023-09-13 RX ADMIN — MORPHINE SULFATE 4 MILLIGRAM(S): 50 CAPSULE, EXTENDED RELEASE ORAL at 23:09

## 2023-09-13 RX ADMIN — MORPHINE SULFATE 4 MILLIGRAM(S): 50 CAPSULE, EXTENDED RELEASE ORAL at 20:47

## 2023-09-13 NOTE — ED PROVIDER NOTE - PATIENT PORTAL LINK FT
You can access the FollowMyHealth Patient Portal offered by [facility] by registering at the following website: [email or web address]. By joining [employer]’s FollowMyHealth portal, you will also be able to view your health information using other applications (apps) compatible with our system.

## 2023-09-13 NOTE — ED PROVIDER NOTE - OBJECTIVE STATEMENT
58 year old male, HIV+, hx of prostate and anal cancer with ostomy presents with 2 days of worsening chills, abdominal pain, penile pain and rectal pain. Pt complains of intermittent, diffuse, stabbing abdominal pain with nausea and 3 bouts of nonbloody emesis yesterday. Pt is incontinent at baseline and cannot feel when he urinates so unable to determine if dysuria is present. Pt also complains of cough but denies any CP or SOB. Pt has been following with oncologist and GI and has an endoscopy due in october.

## 2023-09-13 NOTE — ED ADULT NURSE NOTE - NSFALLUNIVINTERV_ED_ALL_ED
Bed/Stretcher in lowest position, wheels locked, appropriate side rails in place/Call bell, personal items and telephone in reach/Instruct patient to call for assistance before getting out of bed/chair/stretcher/Non-slip footwear applied when patient is off stretcher/[place] to call system/Physically safe environment - no spills, clutter or unnecessary equipment/Purposeful proactive rounding/Room/bathroom lighting operational, light cord in reach

## 2023-09-13 NOTE — ED ADULT NURSE NOTE - OBJECTIVE STATEMENT
Pt arrives to the ED aaox4, ambulatory, breathing even and unlabored in bed. Pt came in with c/o penile and lower abd pain for the last three days. Pt states he has been having bloody urine, pain and burning with urine, with associated sudden abd pain. Pt has left sided ostomy bag from 2022. Pt states that the pain suddenly began and is 10/10. Pt denies chest pain, SOB, dizziness, headache, blurry vision, chills. #20G IV placed in right AC, labs drawn and sent. Bed in lowest position, call bell within reach.

## 2023-09-13 NOTE — ED PROVIDER NOTE - CLINICAL SUMMARY MEDICAL DECISION MAKING FREE TEXT BOX
58 year old male, HIV+, pmhx of prostate and anal cancer with an ostomy. Pt has had 2 days of worsening chills, abdominal pain and penile pain. Pt has a history of recurrent abscesses despite having his anus sewed shut. Pt also has a nonproductive cough and has vomited 3x yesterday. Pt is tachycardic and rectal temp cannot be obtained but looks diaphoretic, plan is to initiate septic workup, CXR due to cough checking for pneumonia, CT scan with IV contrast to assess for an abscess or cancer reoccurrence/progression. Urinalysis to assess for UTI. Also start ibuprofen and tylenol for pain control. [place] att: 58 year old male, HIV+, pmhx of prostate and anal cancer with an ostomy. Pt has had 2 days of worsening chills, abdominal pain and penile pain. Pt has a history of recurrent abscesses despite having his anus sewed shut. Pt also has a nonproductive cough and has vomited 3x yesterday. Pt is tachycardic and rectal temp cannot be obtained but looks diaphoretic, plan is to initiate septic workup, CXR due to cough checking for pneumonia, CT scan with IV contrast to assess for an abscess or cancer reoccurrence/progression. Urinalysis to assess for UTI. Also start ibuprofen and tylenol for pain control.

## 2023-09-13 NOTE — ED PROVIDER NOTE - NSFOLLOWUPINSTRUCTIONS_ED_ALL_ED_FT
Urinary Tract Infection in Men    AMBULATORY CARE:    A urinary tract infection (UTI) is caused by bacteria that get inside your urinary tract. Your urinary tract includes your kidneys, ureters, bladder, and urethra. A UTI is more common in your lower urinary tract, which includes your bladder and urethra.      Common symptoms include the following:    Urinating more often than usual, leaking urine, or waking from sleep to urinate    Pain or burning when you urinate    Pain or pressure in your lower abdomen or back    Urine that smells bad    Blood in your urine  Seek care immediately if:    You are urinating very little or not at all.    You have a high fever with shaking chills.    You have side or back pain that gets worse.  Call your doctor if:    You have a fever.    You do not feel better after 2 days of taking antibiotics.    You are vomiting.    You have new symptoms, such as blood or pus in your urine.    You have questions or concerns about your condition or care.  Treatment for a UTI may include medicines to treat a bacterial infection. You may also need medicines to decrease pain and burning, or decrease the urge to urinate often.    Prevent a UTI:    Empty your bladder often. Urinate and empty your bladder as soon as you feel the need. Do not hold your urine for long periods of time.    Drink liquids as directed. Ask how much liquid to drink each day and which liquids are best for you. You may need to drink more liquids than usual to help flush out the bacteria. Do not drink alcohol, caffeine, or citrus juices. These can irritate your bladder and increase your symptoms. Your healthcare provider may recommend cranberry juice to help prevent a UTI.    Urinate after you have sex. This can help flush out bacteria passed during sex.    Do pelvic muscle exercises often. Pelvic muscle exercises may help you start and stop urinating. Strong pelvic muscles may help you empty your bladder easier. Squeeze these muscles tightly for 5 seconds like you are trying to hold back urine. Then relax for 5 seconds. Gradually work up to squeezing for 10 seconds. Do 3 sets of 15 repetitions a day, or as directed.  Follow up with your doctor as directed: Write down your questions so you remember to ask them during your visits.

## 2023-09-13 NOTE — ED ADULT TRIAGE NOTE - CHIEF COMPLAINT QUOTE
Pt presents to ED ambulatory from home with c/o abdominal, rectal and penile pain. Pt reports hx of rectal and prostate CA finished chemo last year. Pt reports this is chronic pain worsening over past day.

## 2023-09-13 NOTE — ED PROVIDER NOTE - PROGRESS NOTE DETAILS
Tracey De La Garza, PGY-2 DO:  Patient re-evaluated at bedside and is now currently feeling better after treatment. Patient UA positive for UTI, will d/c patient. Patient agreeable to plan.

## 2023-09-13 NOTE — ED PROVIDER NOTE - ATTENDING CONTRIBUTION TO CARE
I performed a history and physical exam of the patient and discussed their management with the resident. I reviewed the resident's note and agree with the documented findings and plan of care. My medical decision making and observations are found above. I performed a history and physical exam of the patient and discussed their management with the resident. I reviewed the resident's note and agree with the documented findings and plan of care. My medical decision making and observations are found above.    DR. REDD, ATTENDING MD-  I have reviewed and discussed the medical student’s documentation and findings with the student. After personally examining the patient, my findings have been added to this documentation.

## 2023-09-14 VITALS
TEMPERATURE: 99 F | SYSTOLIC BLOOD PRESSURE: 167 MMHG | HEART RATE: 92 BPM | DIASTOLIC BLOOD PRESSURE: 90 MMHG | RESPIRATION RATE: 18 BRPM | OXYGEN SATURATION: 97 %

## 2023-09-14 PROCEDURE — 74177 CT ABD & PELVIS W/CONTRAST: CPT | Mod: 26,MA

## 2023-09-14 RX ORDER — CEPHALEXIN 500 MG
1 CAPSULE ORAL
Qty: 28 | Refills: 0
Start: 2023-09-14 | End: 2023-09-27

## 2023-09-14 RX ORDER — MORPHINE SULFATE 50 MG/1
4 CAPSULE, EXTENDED RELEASE ORAL ONCE
Refills: 0 | Status: DISCONTINUED | OUTPATIENT
Start: 2023-09-14 | End: 2023-09-14

## 2023-09-14 RX ORDER — OXYCODONE HYDROCHLORIDE 5 MG/1
1 TABLET ORAL
Qty: 6 | Refills: 0
Start: 2023-09-14 | End: 2023-09-15

## 2023-09-14 RX ADMIN — MORPHINE SULFATE 4 MILLIGRAM(S): 50 CAPSULE, EXTENDED RELEASE ORAL at 00:11

## 2023-09-14 RX ADMIN — MORPHINE SULFATE 4 MILLIGRAM(S): 50 CAPSULE, EXTENDED RELEASE ORAL at 01:54

## 2023-09-14 NOTE — ED ADULT NURSE REASSESSMENT NOTE - PAIN RATING/NUMBER SCALE (0-10): ACTIVITY
0 (no pain/absence of nonverbal indicators of pain)
8 (severe pain)
0 (no pain/absence of nonverbal indicators of pain)

## 2023-09-14 NOTE — ED ADULT NURSE REASSESSMENT NOTE - NS ED NURSE REASSESS COMMENT FT1
Break Coverage RN: Pt A&Ox4, respirations equal and unlabored. Pt resting in stretcher at this time, offers c/o penile pain. MD aware and medicated as per EMR orders. IV patent. Vitals performed. Pending CT. No acute distress noted. Safety maintained, bed in lowest position, side rails raised, call bell in reach.
Pt at baseline mental status, breathing even and unlabored in bed. Pt states the morphine wore off and he is in 8/10 abd pain. MD De La Garza made aware. Pt to be medicated as per eMar.
Pt at baseline mental status, breathing even and unlabored in bed. Pt reports a decrease in his pain to a tolerable level. Pt awaiting CTr
Pt states his pain decreased to a comfortable level.

## 2023-09-17 ENCOUNTER — OUTPATIENT (OUTPATIENT)
Dept: OUTPATIENT SERVICES | Facility: HOSPITAL | Age: 58
LOS: 1 days | End: 2023-09-17
Payer: MEDICAID

## 2023-09-17 ENCOUNTER — APPOINTMENT (OUTPATIENT)
Dept: MRI IMAGING | Facility: IMAGING CENTER | Age: 58
End: 2023-09-17
Payer: MEDICAID

## 2023-09-17 DIAGNOSIS — Z93.3 COLOSTOMY STATUS: Chronic | ICD-10-CM

## 2023-09-17 DIAGNOSIS — B20 HUMAN IMMUNODEFICIENCY VIRUS [HIV] DISEASE: ICD-10-CM

## 2023-09-17 PROCEDURE — 72158 MRI LUMBAR SPINE W/O & W/DYE: CPT | Mod: 26

## 2023-09-17 PROCEDURE — 72158 MRI LUMBAR SPINE W/O & W/DYE: CPT

## 2023-09-17 PROCEDURE — A9585: CPT

## 2023-09-20 ENCOUNTER — RX RENEWAL (OUTPATIENT)
Age: 58
End: 2023-09-20

## 2023-09-20 LAB
-  AMIKACIN: SIGNIFICANT CHANGE UP
-  AMOXICILLIN/CLAVULANIC ACID: SIGNIFICANT CHANGE UP
-  AMPICILLIN/SULBACTAM: SIGNIFICANT CHANGE UP
-  AMPICILLIN: SIGNIFICANT CHANGE UP
-  AZTREONAM: SIGNIFICANT CHANGE UP
-  CEFAZOLIN: SIGNIFICANT CHANGE UP
-  CEFEPIME: SIGNIFICANT CHANGE UP
-  CEFOXITIN: SIGNIFICANT CHANGE UP
-  CEFTRIAXONE: SIGNIFICANT CHANGE UP
-  CEFUROXIME: SIGNIFICANT CHANGE UP
-  CIPROFLOXACIN: SIGNIFICANT CHANGE UP
-  ERTAPENEM: SIGNIFICANT CHANGE UP
-  GENTAMICIN: SIGNIFICANT CHANGE UP
-  LEVOFLOXACIN: SIGNIFICANT CHANGE UP
-  MEROPENEM: SIGNIFICANT CHANGE UP
-  NITROFURANTOIN: SIGNIFICANT CHANGE UP
-  PIPERACILLIN/TAZOBACTAM: SIGNIFICANT CHANGE UP
-  TOBRAMYCIN: SIGNIFICANT CHANGE UP
-  TRIMETHOPRIM/SULFAMETHOXAZOLE: SIGNIFICANT CHANGE UP
CULTURE RESULTS: SIGNIFICANT CHANGE UP
METHOD TYPE: SIGNIFICANT CHANGE UP
ORGANISM # SPEC MICROSCOPIC CNT: SIGNIFICANT CHANGE UP
ORGANISM # SPEC MICROSCOPIC CNT: SIGNIFICANT CHANGE UP
SPECIMEN SOURCE: SIGNIFICANT CHANGE UP

## 2023-09-20 RX ORDER — POLYETHYLENE GLYCOL 3350 17 G/17G
17 POWDER, FOR SOLUTION ORAL
Qty: 510 | Refills: 0 | Status: ACTIVE | COMMUNITY
Start: 2023-07-24 | End: 1900-01-01

## 2023-09-21 ENCOUNTER — APPOINTMENT (OUTPATIENT)
Dept: CARDIOLOGY | Facility: CLINIC | Age: 58
End: 2023-09-21
Payer: MEDICAID

## 2023-09-21 VITALS
BODY MASS INDEX: 28.7 KG/M2 | WEIGHT: 205 LBS | DIASTOLIC BLOOD PRESSURE: 89 MMHG | HEART RATE: 117 BPM | SYSTOLIC BLOOD PRESSURE: 126 MMHG | HEIGHT: 71 IN | TEMPERATURE: 98 F | OXYGEN SATURATION: 96 %

## 2023-09-21 PROCEDURE — 99215 OFFICE O/P EST HI 40 MIN: CPT

## 2023-09-22 ENCOUNTER — APPOINTMENT (OUTPATIENT)
Dept: MRI IMAGING | Facility: IMAGING CENTER | Age: 58
End: 2023-09-22
Payer: MEDICAID

## 2023-09-22 ENCOUNTER — OUTPATIENT (OUTPATIENT)
Dept: OUTPATIENT SERVICES | Facility: HOSPITAL | Age: 58
LOS: 1 days | End: 2023-09-22
Payer: MEDICAID

## 2023-09-22 DIAGNOSIS — Z00.8 ENCOUNTER FOR OTHER GENERAL EXAMINATION: ICD-10-CM

## 2023-09-22 DIAGNOSIS — Z93.3 COLOSTOMY STATUS: Chronic | ICD-10-CM

## 2023-09-22 DIAGNOSIS — B20 HUMAN IMMUNODEFICIENCY VIRUS [HIV] DISEASE: ICD-10-CM

## 2023-09-22 DIAGNOSIS — K62.9 DISEASE OF ANUS AND RECTUM, UNSPECIFIED: Chronic | ICD-10-CM

## 2023-09-22 PROCEDURE — 72197 MRI PELVIS W/O & W/DYE: CPT | Mod: 26

## 2023-09-22 PROCEDURE — 72197 MRI PELVIS W/O & W/DYE: CPT

## 2023-09-22 PROCEDURE — A9585: CPT

## 2023-09-27 ENCOUNTER — APPOINTMENT (OUTPATIENT)
Dept: PAIN MANAGEMENT | Facility: CLINIC | Age: 58
End: 2023-09-27
Payer: MEDICAID

## 2023-09-27 VITALS
HEIGHT: 71 IN | BODY MASS INDEX: 29.4 KG/M2 | SYSTOLIC BLOOD PRESSURE: 112 MMHG | TEMPERATURE: 98.1 F | WEIGHT: 210 LBS | HEART RATE: 86 BPM | DIASTOLIC BLOOD PRESSURE: 81 MMHG | OXYGEN SATURATION: 98 % | RESPIRATION RATE: 16 BRPM

## 2023-09-27 PROCEDURE — 99214 OFFICE O/P EST MOD 30 MIN: CPT

## 2023-10-02 ENCOUNTER — APPOINTMENT (OUTPATIENT)
Dept: CARDIOLOGY | Facility: CLINIC | Age: 58
End: 2023-10-02
Payer: MEDICAID

## 2023-10-02 PROCEDURE — 78452 HT MUSCLE IMAGE SPECT MULT: CPT

## 2023-10-02 PROCEDURE — A9500: CPT

## 2023-10-06 ENCOUNTER — INPATIENT (INPATIENT)
Facility: HOSPITAL | Age: 58
LOS: 3 days | Discharge: HOME | End: 2023-10-10
Attending: STUDENT IN AN ORGANIZED HEALTH CARE EDUCATION/TRAINING PROGRAM | Admitting: STUDENT IN AN ORGANIZED HEALTH CARE EDUCATION/TRAINING PROGRAM
Payer: MEDICAID

## 2023-10-06 ENCOUNTER — APPOINTMENT (OUTPATIENT)
Dept: INTERNAL MEDICINE | Facility: CLINIC | Age: 58
End: 2023-10-06

## 2023-10-06 VITALS
DIASTOLIC BLOOD PRESSURE: 95 MMHG | SYSTOLIC BLOOD PRESSURE: 129 MMHG | HEART RATE: 137 BPM | OXYGEN SATURATION: 95 % | HEIGHT: 71 IN | TEMPERATURE: 100 F | RESPIRATION RATE: 20 BRPM

## 2023-10-06 DIAGNOSIS — N12 TUBULO-INTERSTITIAL NEPHRITIS, NOT SPECIFIED AS ACUTE OR CHRONIC: ICD-10-CM

## 2023-10-06 DIAGNOSIS — K62.9 DISEASE OF ANUS AND RECTUM, UNSPECIFIED: Chronic | ICD-10-CM

## 2023-10-06 DIAGNOSIS — C21.0 MALIGNANT NEOPLASM OF ANUS, UNSPECIFIED: ICD-10-CM

## 2023-10-06 DIAGNOSIS — Z29.9 ENCOUNTER FOR PROPHYLACTIC MEASURES, UNSPECIFIED: ICD-10-CM

## 2023-10-06 DIAGNOSIS — C20 MALIGNANT NEOPLASM OF RECTUM: ICD-10-CM

## 2023-10-06 DIAGNOSIS — N13.30 UNSPECIFIED HYDRONEPHROSIS: ICD-10-CM

## 2023-10-06 DIAGNOSIS — A41.9 SEPSIS, UNSPECIFIED ORGANISM: ICD-10-CM

## 2023-10-06 DIAGNOSIS — B20 HUMAN IMMUNODEFICIENCY VIRUS [HIV] DISEASE: ICD-10-CM

## 2023-10-06 DIAGNOSIS — F41.9 ANXIETY DISORDER, UNSPECIFIED: ICD-10-CM

## 2023-10-06 DIAGNOSIS — I10 ESSENTIAL (PRIMARY) HYPERTENSION: ICD-10-CM

## 2023-10-06 DIAGNOSIS — Z93.3 COLOSTOMY STATUS: Chronic | ICD-10-CM

## 2023-10-06 LAB
ALBUMIN SERPL ELPH-MCNC: 3.2 G/DL — LOW (ref 3.3–5)
ALBUMIN SERPL ELPH-MCNC: 3.7 G/DL — SIGNIFICANT CHANGE UP (ref 3.3–5)
ALP SERPL-CCNC: 69 U/L — SIGNIFICANT CHANGE UP (ref 40–120)
ALP SERPL-CCNC: 83 U/L — SIGNIFICANT CHANGE UP (ref 40–120)
ALT FLD-CCNC: 18 U/L — SIGNIFICANT CHANGE UP (ref 4–41)
ALT FLD-CCNC: 22 U/L — SIGNIFICANT CHANGE UP (ref 4–41)
ANION GAP SERPL CALC-SCNC: 11 MMOL/L — SIGNIFICANT CHANGE UP (ref 7–14)
ANION GAP SERPL CALC-SCNC: 15 MMOL/L — HIGH (ref 7–14)
APPEARANCE UR: ABNORMAL
APTT BLD: 31.5 SEC — SIGNIFICANT CHANGE UP (ref 24.5–35.6)
AST SERPL-CCNC: 17 U/L — SIGNIFICANT CHANGE UP (ref 4–40)
AST SERPL-CCNC: 27 U/L — SIGNIFICANT CHANGE UP (ref 4–40)
B PERT DNA SPEC QL NAA+PROBE: SIGNIFICANT CHANGE UP
B PERT+PARAPERT DNA PNL SPEC NAA+PROBE: SIGNIFICANT CHANGE UP
BACTERIA # UR AUTO: ABNORMAL /HPF
BASE EXCESS BLDV CALC-SCNC: 4.4 MMOL/L — HIGH (ref -2–3)
BASOPHILS # BLD AUTO: 0.03 K/UL — SIGNIFICANT CHANGE UP (ref 0–0.2)
BASOPHILS NFR BLD AUTO: 0.3 % — SIGNIFICANT CHANGE UP (ref 0–2)
BILIRUB SERPL-MCNC: 0.2 MG/DL — SIGNIFICANT CHANGE UP (ref 0.2–1.2)
BILIRUB SERPL-MCNC: 0.3 MG/DL — SIGNIFICANT CHANGE UP (ref 0.2–1.2)
BILIRUB UR-MCNC: NEGATIVE — SIGNIFICANT CHANGE UP
BLOOD GAS VENOUS COMPREHENSIVE RESULT: SIGNIFICANT CHANGE UP
BORDETELLA PARAPERTUSSIS (RAPRVP): SIGNIFICANT CHANGE UP
BUN SERPL-MCNC: 15 MG/DL — SIGNIFICANT CHANGE UP (ref 7–23)
BUN SERPL-MCNC: 18 MG/DL — SIGNIFICANT CHANGE UP (ref 7–23)
C PNEUM DNA SPEC QL NAA+PROBE: SIGNIFICANT CHANGE UP
CALCIUM SERPL-MCNC: 8.3 MG/DL — LOW (ref 8.4–10.5)
CALCIUM SERPL-MCNC: 9.3 MG/DL — SIGNIFICANT CHANGE UP (ref 8.4–10.5)
CAST: 1 /LPF — SIGNIFICANT CHANGE UP (ref 0–4)
CHLORIDE BLDV-SCNC: 104 MMOL/L — SIGNIFICANT CHANGE UP (ref 96–108)
CHLORIDE SERPL-SCNC: 102 MMOL/L — SIGNIFICANT CHANGE UP (ref 98–107)
CHLORIDE SERPL-SCNC: 111 MMOL/L — HIGH (ref 98–107)
CO2 BLDV-SCNC: 29.6 MMOL/L — HIGH (ref 22–26)
CO2 SERPL-SCNC: 22 MMOL/L — SIGNIFICANT CHANGE UP (ref 22–31)
CO2 SERPL-SCNC: 22 MMOL/L — SIGNIFICANT CHANGE UP (ref 22–31)
COLOR SPEC: YELLOW — SIGNIFICANT CHANGE UP
CREAT SERPL-MCNC: 0.9 MG/DL — SIGNIFICANT CHANGE UP (ref 0.5–1.3)
CREAT SERPL-MCNC: 0.99 MG/DL — SIGNIFICANT CHANGE UP (ref 0.5–1.3)
DIFF PNL FLD: ABNORMAL
EGFR: 100 ML/MIN/1.73M2 — SIGNIFICANT CHANGE UP
EGFR: 89 ML/MIN/1.73M2 — SIGNIFICANT CHANGE UP
EOSINOPHIL # BLD AUTO: 0.01 K/UL — SIGNIFICANT CHANGE UP (ref 0–0.5)
EOSINOPHIL NFR BLD AUTO: 0.1 % — SIGNIFICANT CHANGE UP (ref 0–6)
FLUAV SUBTYP SPEC NAA+PROBE: SIGNIFICANT CHANGE UP
FLUBV RNA SPEC QL NAA+PROBE: SIGNIFICANT CHANGE UP
GAS PNL BLDV: 139 MMOL/L — SIGNIFICANT CHANGE UP (ref 136–145)
GIANT PLATELETS BLD QL SMEAR: PRESENT — SIGNIFICANT CHANGE UP
GLUCOSE BLDV-MCNC: 127 MG/DL — HIGH (ref 70–99)
GLUCOSE SERPL-MCNC: 122 MG/DL — HIGH (ref 70–99)
GLUCOSE SERPL-MCNC: 126 MG/DL — HIGH (ref 70–99)
GLUCOSE UR QL: NEGATIVE MG/DL — SIGNIFICANT CHANGE UP
HADV DNA SPEC QL NAA+PROBE: SIGNIFICANT CHANGE UP
HCO3 BLDV-SCNC: 28 MMOL/L — SIGNIFICANT CHANGE UP (ref 22–29)
HCOV 229E RNA SPEC QL NAA+PROBE: SIGNIFICANT CHANGE UP
HCOV HKU1 RNA SPEC QL NAA+PROBE: SIGNIFICANT CHANGE UP
HCOV NL63 RNA SPEC QL NAA+PROBE: SIGNIFICANT CHANGE UP
HCOV OC43 RNA SPEC QL NAA+PROBE: SIGNIFICANT CHANGE UP
HCT VFR BLD CALC: 34.5 % — LOW (ref 39–50)
HCT VFR BLDA CALC: 36 % — LOW (ref 39–51)
HGB BLD CALC-MCNC: 11.9 G/DL — LOW (ref 12.6–17.4)
HGB BLD-MCNC: 11.4 G/DL — LOW (ref 13–17)
HMPV RNA SPEC QL NAA+PROBE: SIGNIFICANT CHANGE UP
HPIV1 RNA SPEC QL NAA+PROBE: SIGNIFICANT CHANGE UP
HPIV2 RNA SPEC QL NAA+PROBE: SIGNIFICANT CHANGE UP
HPIV3 RNA SPEC QL NAA+PROBE: SIGNIFICANT CHANGE UP
HPIV4 RNA SPEC QL NAA+PROBE: SIGNIFICANT CHANGE UP
IANC: 8.28 K/UL — HIGH (ref 1.8–7.4)
IMM GRANULOCYTES NFR BLD AUTO: 0.6 % — SIGNIFICANT CHANGE UP (ref 0–0.9)
INR BLD: 1.21 RATIO — HIGH (ref 0.85–1.18)
KETONES UR-MCNC: NEGATIVE MG/DL — SIGNIFICANT CHANGE UP
LACTATE BLDV-MCNC: 1.3 MMOL/L — SIGNIFICANT CHANGE UP (ref 0.5–2)
LEUKOCYTE ESTERASE UR-ACNC: ABNORMAL
LYMPHOCYTES # BLD AUTO: 1.72 K/UL — SIGNIFICANT CHANGE UP (ref 1–3.3)
LYMPHOCYTES # BLD AUTO: 14.4 % — SIGNIFICANT CHANGE UP (ref 13–44)
M PNEUMO DNA SPEC QL NAA+PROBE: SIGNIFICANT CHANGE UP
MANUAL SMEAR VERIFICATION: SIGNIFICANT CHANGE UP
MCHC RBC-ENTMCNC: 29.5 PG — SIGNIFICANT CHANGE UP (ref 27–34)
MCHC RBC-ENTMCNC: 33 GM/DL — SIGNIFICANT CHANGE UP (ref 32–36)
MCV RBC AUTO: 89.4 FL — SIGNIFICANT CHANGE UP (ref 80–100)
MONOCYTES # BLD AUTO: 1.84 K/UL — HIGH (ref 0–0.9)
MONOCYTES NFR BLD AUTO: 15.4 % — HIGH (ref 2–14)
MYELOCYTES NFR BLD: 1.7 % — HIGH (ref 0–0)
NEUTROPHILS # BLD AUTO: 8.28 K/UL — HIGH (ref 1.8–7.4)
NEUTROPHILS NFR BLD AUTO: 78.1 % — HIGH (ref 43–77)
NEUTS BAND # BLD: 0.9 % — SIGNIFICANT CHANGE UP (ref 0–6)
NITRITE UR-MCNC: NEGATIVE — SIGNIFICANT CHANGE UP
NRBC # BLD: 0 /100 WBCS — SIGNIFICANT CHANGE UP (ref 0–0)
NRBC # FLD: 0 K/UL — SIGNIFICANT CHANGE UP (ref 0–0)
PCO2 BLDV: 39 MMHG — LOW (ref 42–55)
PH BLDV: 7.47 — HIGH (ref 7.32–7.43)
PH UR: 8.5 (ref 5–8)
PLAT MORPH BLD: NORMAL — SIGNIFICANT CHANGE UP
PLATELET # BLD AUTO: 207 K/UL — SIGNIFICANT CHANGE UP (ref 150–400)
PLATELET COUNT - ESTIMATE: NORMAL — SIGNIFICANT CHANGE UP
PO2 BLDV: 67 MMHG — HIGH (ref 25–45)
POTASSIUM BLDV-SCNC: 3.7 MMOL/L — SIGNIFICANT CHANGE UP (ref 3.5–5.1)
POTASSIUM SERPL-MCNC: 3.5 MMOL/L — SIGNIFICANT CHANGE UP (ref 3.5–5.3)
POTASSIUM SERPL-MCNC: 3.7 MMOL/L — SIGNIFICANT CHANGE UP (ref 3.5–5.3)
POTASSIUM SERPL-SCNC: 3.5 MMOL/L — SIGNIFICANT CHANGE UP (ref 3.5–5.3)
POTASSIUM SERPL-SCNC: 3.7 MMOL/L — SIGNIFICANT CHANGE UP (ref 3.5–5.3)
PROT SERPL-MCNC: 6 G/DL — SIGNIFICANT CHANGE UP (ref 6–8.3)
PROT SERPL-MCNC: 6.9 G/DL — SIGNIFICANT CHANGE UP (ref 6–8.3)
PROT UR-MCNC: 300 MG/DL
PROTHROM AB SERPL-ACNC: 13.6 SEC — HIGH (ref 9.5–13)
RAPID RVP RESULT: SIGNIFICANT CHANGE UP
RBC # BLD: 3.86 M/UL — LOW (ref 4.2–5.8)
RBC # FLD: 14.2 % — SIGNIFICANT CHANGE UP (ref 10.3–14.5)
RBC BLD AUTO: NORMAL — SIGNIFICANT CHANGE UP
RBC CASTS # UR COMP ASSIST: 248 /HPF — HIGH (ref 0–4)
RSV RNA SPEC QL NAA+PROBE: SIGNIFICANT CHANGE UP
RV+EV RNA SPEC QL NAA+PROBE: SIGNIFICANT CHANGE UP
SAO2 % BLDV: 94 % — HIGH (ref 67–88)
SARS-COV-2 RNA SPEC QL NAA+PROBE: SIGNIFICANT CHANGE UP
SODIUM SERPL-SCNC: 139 MMOL/L — SIGNIFICANT CHANGE UP (ref 135–145)
SODIUM SERPL-SCNC: 144 MMOL/L — SIGNIFICANT CHANGE UP (ref 135–145)
SP GR SPEC: 1.02 — SIGNIFICANT CHANGE UP (ref 1–1.03)
SQUAMOUS # UR AUTO: 0 /HPF — SIGNIFICANT CHANGE UP (ref 0–5)
UROBILINOGEN FLD QL: 0.2 MG/DL — SIGNIFICANT CHANGE UP (ref 0.2–1)
VARIANT LYMPHS # BLD: 1.7 % — SIGNIFICANT CHANGE UP (ref 0–6)
WBC # BLD: 11.95 K/UL — HIGH (ref 3.8–10.5)
WBC # FLD AUTO: 11.95 K/UL — HIGH (ref 3.8–10.5)
WBC UR QL: 247 /HPF — HIGH (ref 0–5)

## 2023-10-06 PROCEDURE — 99285 EMERGENCY DEPT VISIT HI MDM: CPT | Mod: 25

## 2023-10-06 PROCEDURE — 99223 1ST HOSP IP/OBS HIGH 75: CPT

## 2023-10-06 PROCEDURE — 71045 X-RAY EXAM CHEST 1 VIEW: CPT | Mod: 26

## 2023-10-06 PROCEDURE — 74177 CT ABD & PELVIS W/CONTRAST: CPT | Mod: 26,MA

## 2023-10-06 PROCEDURE — 99221 1ST HOSP IP/OBS SF/LOW 40: CPT

## 2023-10-06 RX ORDER — INFLUENZA VIRUS VACCINE 15; 15; 15; 15 UG/.5ML; UG/.5ML; UG/.5ML; UG/.5ML
0.5 SUSPENSION INTRAMUSCULAR ONCE
Refills: 0 | Status: DISCONTINUED | OUTPATIENT
Start: 2023-10-06 | End: 2023-10-10

## 2023-10-06 RX ORDER — ALPRAZOLAM 0.25 MG
1 TABLET ORAL THREE TIMES A DAY
Refills: 0 | Status: DISCONTINUED | OUTPATIENT
Start: 2023-10-06 | End: 2023-10-10

## 2023-10-06 RX ORDER — EZETIMIBE 10 MG/1
1 TABLET ORAL
Refills: 0 | DISCHARGE

## 2023-10-06 RX ORDER — SODIUM CHLORIDE 9 MG/ML
1000 INJECTION INTRAMUSCULAR; INTRAVENOUS; SUBCUTANEOUS ONCE
Refills: 0 | Status: COMPLETED | OUTPATIENT
Start: 2023-10-06 | End: 2023-10-06

## 2023-10-06 RX ORDER — CEFTRIAXONE 500 MG/1
1000 INJECTION, POWDER, FOR SOLUTION INTRAMUSCULAR; INTRAVENOUS EVERY 12 HOURS
Refills: 0 | Status: DISCONTINUED | OUTPATIENT
Start: 2023-10-06 | End: 2023-10-06

## 2023-10-06 RX ORDER — OXYCODONE HYDROCHLORIDE 5 MG/1
5 TABLET ORAL EVERY 4 HOURS
Refills: 0 | Status: DISCONTINUED | OUTPATIENT
Start: 2023-10-06 | End: 2023-10-07

## 2023-10-06 RX ORDER — DILTIAZEM HCL 120 MG
240 CAPSULE, EXT RELEASE 24 HR ORAL DAILY
Refills: 0 | Status: DISCONTINUED | OUTPATIENT
Start: 2023-10-06 | End: 2023-10-10

## 2023-10-06 RX ORDER — ZOLPIDEM TARTRATE 10 MG/1
1 TABLET ORAL
Qty: 0 | Refills: 0 | DISCHARGE

## 2023-10-06 RX ORDER — PIPERACILLIN AND TAZOBACTAM 4; .5 G/20ML; G/20ML
3.38 INJECTION, POWDER, LYOPHILIZED, FOR SOLUTION INTRAVENOUS ONCE
Refills: 0 | Status: COMPLETED | OUTPATIENT
Start: 2023-10-06 | End: 2023-10-06

## 2023-10-06 RX ORDER — TAMSULOSIN HYDROCHLORIDE 0.4 MG/1
0.4 CAPSULE ORAL AT BEDTIME
Refills: 0 | Status: DISCONTINUED | OUTPATIENT
Start: 2023-10-06 | End: 2023-10-10

## 2023-10-06 RX ORDER — ACETAMINOPHEN 500 MG
1000 TABLET ORAL ONCE
Refills: 0 | Status: COMPLETED | OUTPATIENT
Start: 2023-10-06 | End: 2023-10-06

## 2023-10-06 RX ORDER — CEFTRIAXONE 500 MG/1
1000 INJECTION, POWDER, FOR SOLUTION INTRAMUSCULAR; INTRAVENOUS EVERY 24 HOURS
Refills: 0 | Status: DISCONTINUED | OUTPATIENT
Start: 2023-10-07 | End: 2023-10-07

## 2023-10-06 RX ORDER — OLANZAPINE 15 MG/1
1 TABLET, FILM COATED ORAL
Qty: 0 | Refills: 0 | DISCHARGE

## 2023-10-06 RX ORDER — ENOXAPARIN SODIUM 100 MG/ML
40 INJECTION SUBCUTANEOUS EVERY 24 HOURS
Refills: 0 | Status: DISCONTINUED | OUTPATIENT
Start: 2023-10-06 | End: 2023-10-10

## 2023-10-06 RX ORDER — BICTEGRAVIR SODIUM, EMTRICITABINE, AND TENOFOVIR ALAFENAMIDE FUMARATE 30; 120; 15 MG/1; MG/1; MG/1
1 TABLET ORAL DAILY
Refills: 0 | Status: DISCONTINUED | OUTPATIENT
Start: 2023-10-06 | End: 2023-10-10

## 2023-10-06 RX ORDER — KETOROLAC TROMETHAMINE 30 MG/ML
30 SYRINGE (ML) INJECTION ONCE
Refills: 0 | Status: DISCONTINUED | OUTPATIENT
Start: 2023-10-06 | End: 2023-10-06

## 2023-10-06 RX ORDER — METOPROLOL TARTRATE 50 MG
100 TABLET ORAL DAILY
Refills: 0 | Status: DISCONTINUED | OUTPATIENT
Start: 2023-10-06 | End: 2023-10-10

## 2023-10-06 RX ORDER — MORPHINE SULFATE 50 MG/1
4 CAPSULE, EXTENDED RELEASE ORAL ONCE
Refills: 0 | Status: DISCONTINUED | OUTPATIENT
Start: 2023-10-06 | End: 2023-10-06

## 2023-10-06 RX ORDER — ATORVASTATIN CALCIUM 80 MG/1
10 TABLET, FILM COATED ORAL AT BEDTIME
Refills: 0 | Status: DISCONTINUED | OUTPATIENT
Start: 2023-10-06 | End: 2023-10-10

## 2023-10-06 RX ORDER — LANOLIN ALCOHOL/MO/W.PET/CERES
3 CREAM (GRAM) TOPICAL AT BEDTIME
Refills: 0 | Status: DISCONTINUED | OUTPATIENT
Start: 2023-10-06 | End: 2023-10-10

## 2023-10-06 RX ORDER — ACETAMINOPHEN 500 MG
975 TABLET ORAL ONCE
Refills: 0 | Status: COMPLETED | OUTPATIENT
Start: 2023-10-06 | End: 2023-10-06

## 2023-10-06 RX ORDER — IBUPROFEN 200 MG
600 TABLET ORAL ONCE
Refills: 0 | Status: COMPLETED | OUTPATIENT
Start: 2023-10-06 | End: 2023-10-06

## 2023-10-06 RX ORDER — ONDANSETRON 8 MG/1
4 TABLET, FILM COATED ORAL EVERY 8 HOURS
Refills: 0 | Status: DISCONTINUED | OUTPATIENT
Start: 2023-10-06 | End: 2023-10-10

## 2023-10-06 RX ORDER — ACETAMINOPHEN 500 MG
650 TABLET ORAL EVERY 6 HOURS
Refills: 0 | Status: DISCONTINUED | OUTPATIENT
Start: 2023-10-06 | End: 2023-10-10

## 2023-10-06 RX ORDER — VANCOMYCIN HCL 1 G
1000 VIAL (EA) INTRAVENOUS ONCE
Refills: 0 | Status: COMPLETED | OUTPATIENT
Start: 2023-10-06 | End: 2023-10-06

## 2023-10-06 RX ORDER — SODIUM CHLORIDE 9 MG/ML
1000 INJECTION, SOLUTION INTRAVENOUS
Refills: 0 | Status: COMPLETED | OUTPATIENT
Start: 2023-10-06 | End: 2023-10-06

## 2023-10-06 RX ADMIN — MORPHINE SULFATE 4 MILLIGRAM(S): 50 CAPSULE, EXTENDED RELEASE ORAL at 02:31

## 2023-10-06 RX ADMIN — Medication 1000 MILLIGRAM(S): at 11:00

## 2023-10-06 RX ADMIN — Medication 650 MILLIGRAM(S): at 22:33

## 2023-10-06 RX ADMIN — Medication 650 MILLIGRAM(S): at 21:32

## 2023-10-06 RX ADMIN — Medication 250 MILLIGRAM(S): at 02:23

## 2023-10-06 RX ADMIN — OXYCODONE HYDROCHLORIDE 5 MILLIGRAM(S): 5 TABLET ORAL at 22:33

## 2023-10-06 RX ADMIN — ENOXAPARIN SODIUM 40 MILLIGRAM(S): 100 INJECTION SUBCUTANEOUS at 21:23

## 2023-10-06 RX ADMIN — PIPERACILLIN AND TAZOBACTAM 200 GRAM(S): 4; .5 INJECTION, POWDER, LYOPHILIZED, FOR SOLUTION INTRAVENOUS at 01:42

## 2023-10-06 RX ADMIN — CEFTRIAXONE 100 MILLIGRAM(S): 500 INJECTION, POWDER, FOR SOLUTION INTRAMUSCULAR; INTRAVENOUS at 07:25

## 2023-10-06 RX ADMIN — SODIUM CHLORIDE 100 MILLILITER(S): 9 INJECTION, SOLUTION INTRAVENOUS at 21:33

## 2023-10-06 RX ADMIN — MORPHINE SULFATE 4 MILLIGRAM(S): 50 CAPSULE, EXTENDED RELEASE ORAL at 11:59

## 2023-10-06 RX ADMIN — Medication 600 MILLIGRAM(S): at 16:25

## 2023-10-06 RX ADMIN — Medication 30 MILLIGRAM(S): at 06:15

## 2023-10-06 RX ADMIN — SODIUM CHLORIDE 100 MILLILITER(S): 9 INJECTION, SOLUTION INTRAVENOUS at 07:28

## 2023-10-06 RX ADMIN — OXYCODONE HYDROCHLORIDE 5 MILLIGRAM(S): 5 TABLET ORAL at 21:45

## 2023-10-06 RX ADMIN — OXYCODONE HYDROCHLORIDE 5 MILLIGRAM(S): 5 TABLET ORAL at 18:38

## 2023-10-06 RX ADMIN — MORPHINE SULFATE 4 MILLIGRAM(S): 50 CAPSULE, EXTENDED RELEASE ORAL at 11:23

## 2023-10-06 RX ADMIN — Medication 975 MILLIGRAM(S): at 01:42

## 2023-10-06 RX ADMIN — TAMSULOSIN HYDROCHLORIDE 0.4 MILLIGRAM(S): 0.4 CAPSULE ORAL at 21:32

## 2023-10-06 RX ADMIN — OXYCODONE HYDROCHLORIDE 5 MILLIGRAM(S): 5 TABLET ORAL at 17:39

## 2023-10-06 RX ADMIN — Medication 600 MILLIGRAM(S): at 15:25

## 2023-10-06 RX ADMIN — SODIUM CHLORIDE 1000 MILLILITER(S): 9 INJECTION INTRAMUSCULAR; INTRAVENOUS; SUBCUTANEOUS at 06:15

## 2023-10-06 RX ADMIN — SODIUM CHLORIDE 1000 MILLILITER(S): 9 INJECTION INTRAMUSCULAR; INTRAVENOUS; SUBCUTANEOUS at 01:42

## 2023-10-06 RX ADMIN — ATORVASTATIN CALCIUM 10 MILLIGRAM(S): 80 TABLET, FILM COATED ORAL at 22:50

## 2023-10-06 RX ADMIN — Medication 400 MILLIGRAM(S): at 10:04

## 2023-10-06 NOTE — ED ADULT NURSE REASSESSMENT NOTE - NS ED NURSE REASSESS COMMENT FT1
Spoke to Dr. Brian TREJO at [id number]. MD made aware of pt's pain and that Of[facility] did not change pain. Dr. Torres states will have a team member come to  ED to evaluate pt to determine what further medication can be given for pain.

## 2023-10-06 NOTE — ED ADULT NURSE NOTE - OBJECTIVE STATEMENT
pt received to room 9. A&Ox4, amb at baseline with cane. Pmh of anal cancer prostate cancer not on chemo or radiation, HTN, HLD, colostomy bag, anxiety and depression. Pt c/o "penis pain" associated with dysuria, burning upon urination and hematuria for a few days assocaited with abdominal pain and groin pain. Pt states symptoms come and go, but have worsened. Pt endorses kidney stent are what is causing pt's pain. 2x 20G to R hand placed, septic labs sent, medicated for symptoms. Sinus tach to cardiac monitor, satting 100% on room air, safety maintained. denies chest pain, SOB, n/v, headache, dizziness, numbness/tingling to hands/feet. Awaiting orders

## 2023-10-06 NOTE — CONSULT NOTE ADULT - ASSESSMENT
58 y male with likely cystitis / ascending UTI r/o Urosepsis 58 y male with hx of ureteral strictures with b/l ureteral stents presenting with likely cystitis / ascending UTI r/o Urosepsis

## 2023-10-06 NOTE — ED ADULT NURSE REASSESSMENT NOTE - NS ED NURSE REASSESS COMMENT FT1
Pt resting comfortably in bed, respirations are even and unlabored. Pt noted to be orally febrile at this time. MD Hartley notified. Pt given medication as ordered

## 2023-10-06 NOTE — ED ADULT NURSE NOTE - NSFALLRISKFACTORS_ED_ALL_ED
metastatic cnacer/Bone Condition: Including osteoporosis, prolonged steroid use or metastatic bone disease/cancer/Other

## 2023-10-06 NOTE — ED PROVIDER NOTE - BIRTH SEX
Encourage fluid intake.  Supportive care with over-the-counter Tylenol ibuprofen as needed.  Cool-mist humidifier at bedside.  Throat lozenges, warm salt water gargles, popsicles as needed and tolerated.  Soft foods to help with swallowing.  Monitor for symptoms.  Throat culture is pending and you will be notified of results in 2 to 3 days.  You may go to emergency department with any worsening symptoms.  Follow-up with pediatrician as needed.     Male

## 2023-10-06 NOTE — H&P ADULT - PROBLEM SELECTOR PLAN 5
-c/w home alprazolam PRN
Albuterol PRN, Ascorbic Acid 500mg daily, Vitamin D 1000 U daily, DDAVP 0.2mg HS (listed for SIADH but possibly used for nocturnal enuresis), Colace 200mg HS, Lovenox 40mg SubQ HS, Synthroid 175mcg daily, MOM 30mL Q4H PRN, MVT daily, Nystatin cream [id number] Units/GM to groin area topically BID, Barrier Cream to B/L Buttock QShift, Oxycodone 5mg Q4H PRN pain, Miralax 17GM Q24H PRN, Vitamin b6 (Pyridoxine) 50mg daily, Ranitidine 300mg HS, Senna 8.6mg 2 tabs Q24H PRN, Toprol XL 50mg daily, Depakote 750mg daily, Cogentin 1mg BID, Prolixin 5mg BID, and Melatonin 5mg HS.

## 2023-10-06 NOTE — ED ADULT NURSE REASSESSMENT NOTE - NS ED NURSE REASSESS COMMENT FT1
Pt laying in stretcher, does not appear in acute distress but c/o 10/10 pain to abdomen. Pt states pain is chronic and was increased in his dosage of lyrica at home but has not been effective. Abdomen soft/nondistended. Colostomy stoma with bag noted to mid to left abdominal area. Draining formed brown stool. Stoma beefy red.

## 2023-10-06 NOTE — PATIENT PROFILE ADULT - NSPROMEDSADMININFO_GEN_A_NUR
Patients wife is calling and would like to have Dr. Olvera order a Gloucose tolerence test at patients appointment tomorrow.     
Pt scheduled for TCM appt tomorrow 3/9/23  Requesting orders at this appt for glucose tolerance test.   
no concerns

## 2023-10-06 NOTE — ED ADULT NURSE REASSESSMENT NOTE - CONDITION
received pt in room 9. c/o penile pain, temp 101.6, hr 117.  given meds as ordered.  report given to ramo pradhan.  for trasfer to floor.

## 2023-10-06 NOTE — H&P ADULT - NSHPPHYSICALEXAM_GEN_ALL_CORE
.  VITAL SIGNS:  T(C): 39.1 (10-06-23 @ 14:25), Max: 39.3 (10-06-23 @ 05:56)  T(F): 102.4 (10-06-23 @ 14:25), Max: 102.7 (10-06-23 @ 05:56)  HR: 109 (10-06-23 @ 14:25) (107 - 137)  BP: 143/100 (10-06-23 @ 14:25) (108/68 - 143/100)  BP(mean): --  RR: 20 (10-06-23 @ 14:25) (16 - 20)  SpO2: 100% (10-06-23 @ 14:25) (95% - 100%)  Wt(kg): --    PHYSICAL EXAM:    Constitutional: resting comfortably in bed; NAD  Head: NC/AT  Eyes: PERRL, EOMI, anicteric sclera  ENT: no nasal discharge; uvula midline, no oropharyngeal erythema or exudates; MMM  Respiratory: CTA B/L; no W/R/R, no retractions  Cardiac: +S1/S2; RRR; no M/R/G; PMI non-displaced  Gastrointestinal: abdomen soft, NT/ND; no rebound or guarding; +BSx4; no CVAT B/L. mild suprapubic tenderness  Extremities: WWP, no clubbing or cyanosis; no peripheral edema  Musculoskeletal: NROM x4; no joint swelling, tenderness or erythema  Vascular: 2+ radial, femoral, DP/PT pulses B/L  Dermatologic: skin warm, dry and intact; no rashes, wounds, or scars  Neurologic: AAOx3; CNII-XII grossly intact; no focal deficits  Psychiatric: affect and characteristics of appearance, verbalizations, behaviors are appropriate

## 2023-10-06 NOTE — ED ADULT NURSE REASSESSMENT NOTE - NS ED NURSE REASSESS COMMENT FT1
no distress noted. Pt watching show on cell phone. Pt smiling. States pain is so much better, rating at 3/10 pain to abdomen now. Acceptable for pt at this time.

## 2023-10-06 NOTE — H&P ADULT - NSHPREVIEWOFSYSTEMS_GEN_ALL_CORE
REVIEW OF SYSTEMS:    CONSTITUTIONAL: fevers +  EYES/ENT: No visual changes;  No vertigo or throat pain   NECK: No pain or stiffness  RESPIRATORY: No cough, wheezing, hemoptysis; No shortness of breath  CARDIOVASCULAR: No chest pain or palpitations  GASTROINTESTINAL: No abdominal or epigastric pain. No nausea, vomiting, or hematemesis; No diarrhea or constipation. No melena or hematochezia.  GENITOURINARY: as above  NEUROLOGICAL: No numbness or weakness  SKIN: No itching, rashes

## 2023-10-06 NOTE — ED PROVIDER NOTE - CLINICAL SUMMARY MEDICAL DECISION MAKING FREE TEXT BOX
58-year-old male history HTN, HLD, HIV, prostate cancer, anal cancer with colostomy, lung cancer, walks with cane at baseline, presenting with dysuria, hematuria, abdominal pain and fever.  Patient's has b/l ureteral stents, beleives symptoms are related, symptoms intermittent, worsened today.  Abdominal pain is generalized, worse lower than upper. Denies N/V/D, CP, SOB, dizziness paresthesia. Febrile, tachy, normotensive. Abd soft, nd, +diffuse ttp (lower>upper), +guarding, no rebound, +suprapubic ttp. c/f intraabdominal infection, most likely UTI/Pyelonephritis, possible infected stone vs renal abscess. Will get labs, UA, cultures, CT, give ivf, anti-pyretic, pain meds, abx, and reassess.

## 2023-10-06 NOTE — ED PROVIDER NOTE - OBJECTIVE STATEMENT
58-year-old male history HTN, HLD, HIV, prostate cancer, anal cancer with colostomy, lung cancer, walks with cane at baseline, presenting with dysuria, hematuria, abdominal pain and fever.  Patient's has b/l ureteral stents, beleives symptoms are related, symptoms intermittent, worsened today.  Abdominal pain is generalized, worse lower than upper. Denies N/V/D, CP, SOB, dizziness paresthesia.

## 2023-10-06 NOTE — PATIENT PROFILE ADULT - FALL HARM RISK - UNIVERSAL INTERVENTIONS
Bed in lowest position, wheels locked, appropriate side rails in place/Call bell, personal items and telephone in reach/Instruct patient to call for assistance before getting out of bed or chair/Non-slip footwear when patient is out of bed/[place] to call system/Physically safe environment - no spills, clutter or unnecessary equipment/Purposeful Proactive Rounding/Room/bathroom lighting operational, light cord in reach

## 2023-10-06 NOTE — H&P ADULT - HISTORY OF PRESENT ILLNESS
57 y/o M with pmh of HTN, HLD, anxiety/depression, HIV on Biktarvy prostate s/p radiation (2016), anal ca s/p radiation c/b rectal perforation s/p abdominal peritoneal resection with end colostomy, radiation cystitis and ureteral strictures s/p ureteral stents presents with dysuria, burning with urination, abdominal pain, penile pain.     Patient states he always have penile pain but his urinary symptoms are worsening for the last few days, and the pain brought him in. He states has been peeing very frequent small amounts, looked normal in color, no changes in smell. He noted new burning with urination, feels like not all the urine is coming out. He states does not take any pain medications at home currently. He is frustrated about his pain, and is hoping this will go away. Also notes fevers at home, highest 102. Denies any cough, sick contacts, chills, nausea, vomiting, penile discharge. Ostomy output has been stable.     In the ED patient febrile to 102.4, , /100. Laboratories wbc 11.9, normal renal function. U/A positive for leukocytes and bacteria. CT abdomen with redemonstrated moderate hydronephrosis with bilateral urothelial thickening and adjacent inflammatory stranding, which may be due to reactive inflammation or ascending urinary tract infection, Patient given IV Ceftriaxone, admitted for Urosepsis.

## 2023-10-06 NOTE — ED PROVIDER NOTE - PROGRESS NOTE DETAILS
Cornelius Hartley PGY3: pt with pyelonephritis given UA and CT. TBA pending CMP, CMP machine was down per lab, now back up, will be delay with results. Pt stable. additional pain medication and ivf ordered. Arabella Johns MD (PGY3): Patient endorsed to hospitalist. maintenance fluids and abx ordered.

## 2023-10-06 NOTE — ED PROVIDER NOTE - ATTENDING CONTRIBUTION TO CARE
59 y/o M with h/o HTN, hyperlipidemia, HIV on biktarvy, prostate ca s/p radiation, anal ca s/p resection and colostomy here with fever, abd pain, dysuria.  Pt reports chronic abd pain since his colostomy over 1 year ago, but acutely worsening over the past 1 week.  Pt also reports chronic penile pain, but now with worsening pain with urination.  Pt developed fevers to 102F at home today.  No ha, neck pain or stffness, rash, cp, cough, sob, uri sxs, n/v, changes in ostomy output testicular pain or swelling.  He was seen in ED 1 month prior to similar penile and abd pain and found to have a UTI.  Pt completed course of abx approx 2 weeks ago.  No other recent illness or hospitalizations.    Pt lying comfortably in stretcher, awake and alert, nontoxic.  Febrile, tachycardic, VSS.  NCAT neck supple, no nuchal rigidity.  Lungs cta bl.  Cards nl S1/S2, tachy reg, no MRG.  Abd soft diffusely tender without rebound or guarding (+)ostomy in place.  No pedal edema or calf tenderness.    Pt meets SIRS criteria for sepsis - concern for intraabd or  etiology in this immunocompromised pt.  Will start hydration, broad spectrum abx, eval for viral uri with RVP, pna with cxr, ua, labs, incl cultures, ct a/p, admit.

## 2023-10-06 NOTE — H&P ADULT - PROBLEM SELECTOR PLAN 1
Febrile 102.4, , wbc 11.9. Positive U/A, with urinary symptoms.  CXR clear, CT abdomen with concerns for ascending urinary tract infection  lactate negative  -s/p 2L IVFs  -c/w Ceftriaxone, previous cultures reviewed with pansensitive Proteus  -f.u Bcx, Ucx  -monitor fever curve, trend wbc/ vital signs

## 2023-10-06 NOTE — H&P ADULT - PROBLEM SELECTOR PLAN 4
s/p radiation c/b rectal perforation s/p abdominal peritoneal resection with end colostomy  -ostomy care

## 2023-10-06 NOTE — CONSULT NOTE ADULT - PROBLEM SELECTOR RECOMMENDATION 9
-F/U Blood and Urine Cx's  -Continue broad spectrum abx and narrow scope as per cx's  -Antipyretics  -Since Hydronephrosis and creatinine are stable would not change stents emergently  -Obtain Post Void Residual and place davis cath if urinary retention is present  -Will follow with you  -D/W Dr. Mathew -F/U Blood and Urine Cx's  -Continue broad spectrum abx and narrow scope as per cx's  -Antipyretics  -No urological intervention indicated at this time  -Obtain Post Void Residual to ensure patient is voiding   -Urology to follow along  -D/W with Dr. Mathew

## 2023-10-06 NOTE — ED ADULT NURSE REASSESSMENT NOTE - NS ED NURSE REASSESS COMMENT FT1
pt incontinent of urine. Also c/o chills. Rigors noted when in room with pt. Oral temp obtained was 102.4F. Wife assisted pt with changing of sheets and gown. MAYA Torres ordered Ibuprofen. Given to pt.

## 2023-10-06 NOTE — H&P ADULT - ASSESSMENT
57 y/o M with pmh of HTN, HLD, anxiety/depression, HIV on Biktarvy, prostate ca s/p radiation (2016), anal ca s/p radiation c/b rectal perforation s/p abdominal peritoneal resection with end colostomy, radiation cystitis and ureteral strictures s/p ureteral stents presents with dysuria, burning with urination, abdominal pain, penile pain, found to be septic due to UTI with moderate bilateral hydronephrosis.

## 2023-10-06 NOTE — CONSULT NOTE ADULT - ATTENDING COMMENTS
Patient seen and examined. Agree with assessment and plan.   Follow urne and blood culture  Discussed with with Dr. Mathew.

## 2023-10-06 NOTE — CONSULT NOTE ADULT - SUBJECTIVE AND OBJECTIVE BOX
HPI:  57 y/o M with pmh of HTN, HLD, anxiety/depression, HIV on Biktarvy prostate s/p radiation (2016), anal ca s/p radiation c/b rectal perforation s/p abdominal peritoneal resection with end colostomy, radiation cystitis and ureteral strictures s/p ureteral stents presents with dysuria, burning with urination, abdominal pain, penile pain.     Patient states he always have penile pain but his urinary symptoms are worsening for the last few days, and the pain brought him in. He states has been peeing very frequent small amounts, looked normal in color, no changes in smell. He noted new burning with urination, feels like not all the urine is coming out. He states does not take any pain medications at home currently. He is frustrated about his pain, and is hoping this will go away. Also notes fevers at home, highest 102. Denies any cough, sick contacts, chills, nausea, vomiting, penile discharge. Ostomy output has been stable.     In the ED patient febrile to 102.4, , /100. Laboratories wbc 11.9, normal renal function. U/A positive for leukocytes and bacteria. CT abdomen with redemonstrated moderate hydronephrosis with bilateral urothelial thickening and adjacent inflammatory stranding, which may be due to reactive inflammation or ascending urinary tract infection, Patient given IV Ceftriaxone, admitted for Urosepsis.    (06 Oct 2023 17:21)      PAST MEDICAL & SURGICAL HISTORY:  HTN (hypertension)      HLD (hyperlipidemia)      HIV infection      Depressive disorder      Anxiety      Prostate cancer      Anal cancer      Diverticulosis      Lung cancer      Anal lesion      S/P colostomy          MEDICATIONS  (STANDING):  atorvastatin 10 milliGRAM(s) Oral at bedtime  bictegravir 50 mG/emtricitabine 200 mG/tenofovir alafenamide 25 mG (BIKTARVY) 1 Tablet(s) Oral daily  diltiazem    milliGRAM(s) Oral daily  enoxaparin Injectable 40 milliGRAM(s) SubCutaneous every 24 hours  lactated ringers. 1000 milliLiter(s) (100 mL/Hr) IV Continuous <Continuous>  metoprolol succinate  milliGRAM(s) Oral daily  tamsulosin 0.4 milliGRAM(s) Oral at bedtime    MEDICATIONS  (PRN):  acetaminophen     Tablet .. 650 milliGRAM(s) Oral every 6 hours PRN Temp greater or equal to 38C (100.4F), Mild Pain (1 - 3)  ALPRAZolam 1 milliGRAM(s) Oral three times a day PRN anxiety  aluminum hydroxide/magnesium hydroxide/simethicone Suspension 30 milliLiter(s) Oral every 4 hours PRN Dyspepsia  melatonin 3 milliGRAM(s) Oral at bedtime PRN Insomnia  ondansetron Injectable 4 milliGRAM(s) IV Push every 8 hours PRN Nausea and/or Vomiting  oxyCODONE    IR 5 milliGRAM(s) Oral every 4 hours PRN moderate/severe pain      FAMILY HISTORY:  FH: prostate cancer    FH: breast cancer        Allergies    No Known Allergies    Intolerances        SOCIAL HISTORY:    REVIEW OF SYSTEMS: Otherwise negative as stated in HPI      Vital Signs Last 24 Hrs  T(C): 39.1 (06 Oct 2023 14:25), Max: 39.3 (06 Oct 2023 05:56)  T(F): 102.4 (06 Oct 2023 14:25), Max: 102.7 (06 Oct 2023 05:56)  HR: 109 (06 Oct 2023 14:25) (107 - 137)  BP: 143/100 (06 Oct 2023 14:25) (108/68 - 143/100)  BP(mean): --  RR: 20 (06 Oct 2023 14:25) (16 - 20)  SpO2: 100% (06 Oct 2023 14:25) (95% - 100%)    Parameters below as of 06 Oct 2023 14:25  Patient On (Oxygen Delivery Method): room air        PHYSICAL EXAM:    General:	[ ] Awake and Alert in no acute distress    Respiratory and Thorax: [  ] no resp distress   	  Cardiovascular: [ ] S1,S2 Reg Rate    Gastrointestinal: [ ]soft  [ ] non tender [ ] +BS  [ ] scars,   CVAT [ ]Y  [ ]N  /    [ ]L  [ ]R     Genitourinary: Glans Circumcised [ ]Y  [ ]N, [ ]lesions     Meatus Discharge [ ]Y  [ ] N,  Blood [ ]Y [ ] N                               Testes  Descended [ ]Y  [ ]N,    Tender [ ]Y  [ ]N,   Epididymis Tender  [ ]Y [ ]N,    Cremasteric [ ]Y  [ ]N                        	    Musculoskeletal / Extremities:  Edema [ ]Y [ ]N,  AROM x 4 [ ]Y  [ ]N  	          LABS:                        11.4   11.95 )-----------( 207      ( 06 Oct 2023 01:43 )             34.5     10-06    144  |  111<H>  |  15  ----------------------------<  122<H>  3.5   |  22  |  0.90    Ca    8.3<L>      06 Oct 2023 07:23    TPro  6.0  /  Alb  3.2<L>  /  TBili  0.3  /  DBili  x   /  AST  17  /  ALT  18  /  AlkPhos  69  10-06    PT/INR - ( 06 Oct 2023 01:43 )   PT: 13.6 sec;   INR: 1.21 ratio         PTT - ( 06 Oct 2023 01:43 )  PTT:31.5 sec  Urinalysis Basic - ( 06 Oct 2023 07:23 )    Color: x / Appearance: x / SG: x / pH: x  Gluc: 122 mg/dL / Ketone: x  / Bili: x / Urobili: x   Blood: x / Protein: x / Nitrite: x   Leuk Esterase: x / RBC: x / WBC x   Sq Epi: x / Non Sq Epi: x / Bacteria: x      URINE CX:    RADIOLOGY:    ASSESSMENT & PLAN:   HPI:  57 y/o M with pmh of HTN, HLD, anxiety/depression, HIV on Biktarvy prostate s/p radiation (2016), anal ca s/p radiation c/b rectal perforation s/p abdominal peritoneal resection with end colostomy, radiation cystitis and ureteral strictures managed initially with b/l nephrostomy tubes in 11/2022 and then in 3/2023 changed to b/l ureteral stents, (last exchanged 6/1/2023), presents with dysuria, burning with urination, abdominal pain, penile pain.     Patient states he always have penile pain but his urinary symptoms are worsening for the last few days, and the pain brought him in. He states has been peeing very frequent small amounts, looked normal in color, no changes in smell. He noted new burning with urination, feels like not all the urine is coming out. He states does not take any pain medications at home currently. He is frustrated about his pain, and is hoping this will go away. Also notes fevers at home, highest 102 for the past 5 days,  Denies any cough, sick contacts, chills, nausea, vomiting, penile discharge. Ostomy output has been stable.     In the ED patient febrile to 102.4, , /100. Laboratories wbc 11.9, normal renal function. U/A positive for leukocytes and bacteria. CT abdomen with redemonstrated moderate hydronephrosis with bilateral urothelial thickening and adjacent inflammatory stranding, which may be due to reactive inflammation or ascending urinary tract infection,   Patient given IV Ceftriaxone, admitted to [facility] for Urosepsis.     Urology Called to evaluate      PAST MEDICAL & SURGICAL HISTORY:    HTN (hypertension)    HLD (hyperlipidemia)    HIV infection      Depressive disorder      Anxiety      Prostate cancer      Anal cancer      Diverticulosis      Lung cancer      Anal lesion      S/P colostomy          MEDICATIONS  (STANDING):  atorvastatin 10 milliGRAM(s) Oral at bedtime  bictegravir 50 mG/emtricitabine 200 mG/tenofovir alafenamide 25 mG (BIKTARVY) 1 Tablet(s) Oral daily  diltiazem    milliGRAM(s) Oral daily  enoxaparin Injectable 40 milliGRAM(s) SubCutaneous every 24 hours  lactated ringers. 1000 milliLiter(s) (100 mL/Hr) IV Continuous <Continuous>  metoprolol succinate  milliGRAM(s) Oral daily  tamsulosin 0.4 milliGRAM(s) Oral at bedtime    MEDICATIONS  (PRN):  acetaminophen     Tablet .. 650 milliGRAM(s) Oral every 6 hours PRN Temp greater or equal to 38C (100.4F), Mild Pain (1 - 3)  ALPRAZolam 1 milliGRAM(s) Oral three times a day PRN anxiety  aluminum hydroxide/magnesium hydroxide/simethicone Suspension 30 milliLiter(s) Oral every 4 hours PRN Dyspepsia  melatonin 3 milliGRAM(s) Oral at bedtime PRN Insomnia  ondansetron Injectable 4 milliGRAM(s) IV Push every 8 hours PRN Nausea and/or Vomiting  oxyCODONE    IR 5 milliGRAM(s) Oral every 4 hours PRN moderate/severe pain      FAMILY HISTORY:    FH: prostate cancer    FH: breast cancer        Allergies    No Known Allergies      SOCIAL HISTORY:      Denies Smoking      REVIEW OF SYSTEMS: Otherwise negative as stated in HPI      Vital Signs Last 24 Hrs  T(C): 39.1 (06 Oct 2023 14:25), Max: 39.3 (06 Oct 2023 05:56)  T(F): 102.4 (06 Oct 2023 14:25), Max: 102.7 (06 Oct 2023 05:56)  HR: 109 (06 Oct 2023 14:25) (107 - 137)  BP: 143/100 (06 Oct 2023 14:25) (108/68 - 143/100)  BP(mean): --  RR: 20 (06 Oct 2023 14:25) (16 - 20)  SpO2: 100% (06 Oct 2023 14:25) (95% - 100%)    Parameters below as of 06 Oct 2023 14:25  Patient On (Oxygen Delivery Method): room air        PHYSICAL EXAM:    General: [x ] Awake and Alert in no acute distress    Respiratory and Thorax: [ x ] no resp distress   	  Cardiovascular: [x ] S1,S2 Reg Rate    Gastrointestinal: [x ]soft  [x ] non tender,  mild CVAT [ x]Y  [ ]N  /    [x ]L  [ ]R     Genitourinary: Glans Circumcised [ ]Y  [x ]N, [ ]lesions     Meatus Discharge [ ]Y  [x ] N,  Blood [ ]Y [x ] N                               Testes  Descended [x ]Y  [ ]N,    Tender [x ]Y  [ ]N,                          	  Musculoskeletal / Extremities:  Edema [ ]Y [x ]N,  AROM x 4 [x ]Y  [ ]N  	          LABS:                        11.4   11.95 )-----------( 207      ( 06 Oct 2023 01:43 )             34.5     10-06    144  |  111<H>  |  15  ----------------------------<  122<H>  3.5   |  22  |  0.90    Ca    8.3<L>      06 Oct 2023 07:23    TPro  6.0  /  Alb  3.2<L>  /  TBili  0.3  /  DBili  x   /  AST  17  /  ALT  18  /  AlkPhos  69  10-06    PT/INR - ( 06 Oct 2023 01:43 )   PT: 13.6 sec;   INR: 1.21 ratio         PTT - ( 06 Oct 2023 01:43 )  PTT:31.5 sec  Urinalysis Basic - ( 06 Oct 2023 07:23 )    Color: x / Appearance: x / SG: x / pH: x  Gluc: 122 mg/dL / Ketone: x  / Bili: x / Urobili: x   Blood: x / Protein: x / Nitrite: x   Leuk Esterase: x / RBC: x / WBC x   Sq Epi: x / Non Sq Epi: x / Bacteria: x      URINE CX: pend    RADIOLOGY:    < from: CT Abdomen and Pelvis w/ IV Cont (10.06.23 @ 02:09) >    ACC: [id number] EXAM:  CT ABDOMEN AND PELVIS IC   ORDERED BY: WANDA FLOWERS     PROCEDURE DATE:  10/06/2023          INTERPRETATION:  CLINICAL INFORMATION: Fever    COMPARISON: CT abdomen pelvis 9/14/2023    CONTRAST/COMPLICATIONS:  IV Contrast: Omnipaque 350  90 cc administered   10 cc discarded  Oral Contrast: NONE  Complications: None reported at time of study completion    PROCEDURE:  CT of the Abdomen and Pelvis was performed.  Sagittal and coronal reformats were performed.    FINDINGS:  LOWER CHEST: Right-sided gynecomastia.    LIVER: Within normal limits.  BILE DUCTS: Normal caliber.  GALLBLADDER: Within normal limits.  SPLEEN: Within normal limits.  PANCREAS: Fatty atrophy  ADRENALS: Within normal limits.  KIDNEYS/URETERS: Moderate lateral hydronephrosis. Bilateral multicystic   kidneys. Bilateral nephroureteral stents terminating in the bladder   lumen. Long segmental urothelial thickening and adjacent inflammatory   stranding is seen    BLADDER: Underdistended  REPRODUCTIVE ORGANS:Prostate within normal limits.    BOWEL: Abdominoperineal resection with right lower quadrant ostomy. No   bowel obstruction. Prominent stool burden. The appendix is normal.  PERITONEUM: No ascites.  VESSELS: Mild atherosclerotic changes. IVC filter.  RETROPERITONEUM/LYMPH NODES: No lymphadenopathy.  ABDOMINAL WALL: Postsurgical changes. Right lower quadrant ostomy. Again   seen is a VRAM flap. BONES: Degenerative changes.    IMPRESSION:  Moderate hydronephrosis is again redemonstrated, with bilateral   urothelial thickening and adjacent inflammatory stranding, which may be   due to reactive inflammation or ascending urinary tract infection,   correlate with urinalysis.          --- End of Report ---          KANDY MCDANIEL MD; Resident Radiologist  This document has been electronically signed.  LAYNE GARCIA MD; Attending Radiologist  This document has been electronically signed. Oct  6 2023  3:29AM    < end of copied text >

## 2023-10-06 NOTE — H&P ADULT - PROBLEM SELECTOR PLAN 2
moderate hydronephrosis, appears chronic  pt has bilateral urethral stents  -Urology consulted  -c/w flomax

## 2023-10-06 NOTE — ED ADULT NURSE NOTE - NSFALLRISKINTERV_ED_ALL_ED

## 2023-10-06 NOTE — ED ADULT TRIAGE NOTE - CHIEF COMPLAINT QUOTE
Pt c/o fever x 1 day. Also endorsing abdominal pain, groin pain, back pain, dysuria, hematuria. PMHx HTN, HLD, HIV+, hx of prostate and anal cancer with ostomy

## 2023-10-06 NOTE — ED PROVIDER NOTE - ADMIT DISPOSITION PRESENT ON ADMISSION SEPSIS Q1 - RE-EVALUATED PATIENT FLUID AND VITAL SIGNS
Home meds: lantus 45, humalog 40 TID  Plan:  - lantus 25 at night decreased to 20, sugars this morning 200 after eating, will increase lantus to 23   - sliding scale coverage for now    #Diarrhea  PLAN:  - c/w PO diet DASH/TLC  - c/w 2mg loperamide Q3hrs PRN as pt was having diarrhea   - diarrhea improved while on loperamide I have re-evaluated the patient's fluid status and reviewed vital signs. Clinical perfusion assessment was performed.

## 2023-10-06 NOTE — H&P ADULT - NSHPLABSRESULTS_GEN_ALL_CORE
.  LABS:                         11.4   11.95 )-----------( 207      ( 06 Oct 2023 01:43 )             34.5     10-06    144  |  111<H>  |  15  ----------------------------<  122<H>  3.5   |  22  |  0.90    Ca    8.3<L>      06 Oct 2023 07:23    TPro  6.0  /  Alb  3.2<L>  /  TBili  0.3  /  DBili  x   /  AST  17  /  ALT  18  /  AlkPhos  69  10-06    PT/INR - ( 06 Oct 2023 01:43 )   PT: 13.6 sec;   INR: 1.21 ratio         PTT - ( 06 Oct 2023 01:43 )  PTT:31.5 sec  Urinalysis Basic - ( 06 Oct 2023 07:23 )    Color: x / Appearance: x / SG: x / pH: x  Gluc: 122 mg/dL / Ketone: x  / Bili: x / Urobili: x   Blood: x / Protein: x / Nitrite: x   Leuk Esterase: x / RBC: x / WBC x   Sq Epi: x / Non Sq Epi: x / Bacteria: x                RADIOLOGY, EKG & ADDITIONAL TESTS:   EKG personally reviewed: sinus tachycardia , right axis deviation, RVH, Qtc 422- unchanged from prior    < from: CT Abdomen and Pelvis w/ IV Cont (10.06.23 @ 02:09) >    IMPRESSION:  Moderate hydronephrosis is again redemonstrated, with bilateral   urothelial thickening and adjacent inflammatory stranding, which may be   due to reactive inflammation or ascending urinary tract infection,   correlate with urinalysis.

## 2023-10-07 LAB
ANION GAP SERPL CALC-SCNC: 9 MMOL/L — SIGNIFICANT CHANGE UP (ref 7–14)
BASOPHILS # BLD AUTO: 0.02 K/UL — SIGNIFICANT CHANGE UP (ref 0–0.2)
BASOPHILS NFR BLD AUTO: 0.3 % — SIGNIFICANT CHANGE UP (ref 0–2)
BUN SERPL-MCNC: 12 MG/DL — SIGNIFICANT CHANGE UP (ref 7–23)
CALCIUM SERPL-MCNC: 9 MG/DL — SIGNIFICANT CHANGE UP (ref 8.4–10.5)
CHLORIDE SERPL-SCNC: 105 MMOL/L — SIGNIFICANT CHANGE UP (ref 98–107)
CO2 SERPL-SCNC: 26 MMOL/L — SIGNIFICANT CHANGE UP (ref 22–31)
CREAT SERPL-MCNC: 0.84 MG/DL — SIGNIFICANT CHANGE UP (ref 0.5–1.3)
EGFR: 102 ML/MIN/1.73M2 — SIGNIFICANT CHANGE UP
EOSINOPHIL # BLD AUTO: 0 K/UL — SIGNIFICANT CHANGE UP (ref 0–0.5)
EOSINOPHIL NFR BLD AUTO: 0 % — SIGNIFICANT CHANGE UP (ref 0–6)
GLUCOSE SERPL-MCNC: 109 MG/DL — HIGH (ref 70–99)
HCT VFR BLD CALC: 31.4 % — LOW (ref 39–50)
HGB BLD-MCNC: 10.2 G/DL — LOW (ref 13–17)
IANC: 4.86 K/UL — SIGNIFICANT CHANGE UP (ref 1.8–7.4)
IMM GRANULOCYTES NFR BLD AUTO: 0.4 % — SIGNIFICANT CHANGE UP (ref 0–0.9)
LYMPHOCYTES # BLD AUTO: 1.31 K/UL — SIGNIFICANT CHANGE UP (ref 1–3.3)
LYMPHOCYTES # BLD AUTO: 18.1 % — SIGNIFICANT CHANGE UP (ref 13–44)
MAGNESIUM SERPL-MCNC: 1.8 MG/DL — SIGNIFICANT CHANGE UP (ref 1.6–2.6)
MCHC RBC-ENTMCNC: 29.1 PG — SIGNIFICANT CHANGE UP (ref 27–34)
MCHC RBC-ENTMCNC: 32.5 GM/DL — SIGNIFICANT CHANGE UP (ref 32–36)
MCV RBC AUTO: 89.7 FL — SIGNIFICANT CHANGE UP (ref 80–100)
MONOCYTES # BLD AUTO: 1.03 K/UL — HIGH (ref 0–0.9)
MONOCYTES NFR BLD AUTO: 14.2 % — HIGH (ref 2–14)
MRSA PCR RESULT.: SIGNIFICANT CHANGE UP
NEUTROPHILS # BLD AUTO: 4.86 K/UL — SIGNIFICANT CHANGE UP (ref 1.8–7.4)
NEUTROPHILS NFR BLD AUTO: 67 % — SIGNIFICANT CHANGE UP (ref 43–77)
NRBC # BLD: 0 /100 WBCS — SIGNIFICANT CHANGE UP (ref 0–0)
NRBC # FLD: 0 K/UL — SIGNIFICANT CHANGE UP (ref 0–0)
PHOSPHATE SERPL-MCNC: 2.2 MG/DL — LOW (ref 2.5–4.5)
PLATELET # BLD AUTO: 170 K/UL — SIGNIFICANT CHANGE UP (ref 150–400)
POTASSIUM SERPL-MCNC: 3.5 MMOL/L — SIGNIFICANT CHANGE UP (ref 3.5–5.3)
POTASSIUM SERPL-SCNC: 3.5 MMOL/L — SIGNIFICANT CHANGE UP (ref 3.5–5.3)
RBC # BLD: 3.5 M/UL — LOW (ref 4.2–5.8)
RBC # FLD: 14 % — SIGNIFICANT CHANGE UP (ref 10.3–14.5)
S AUREUS DNA NOSE QL NAA+PROBE: SIGNIFICANT CHANGE UP
SODIUM SERPL-SCNC: 140 MMOL/L — SIGNIFICANT CHANGE UP (ref 135–145)
WBC # BLD: 7.25 K/UL — SIGNIFICANT CHANGE UP (ref 3.8–10.5)
WBC # FLD AUTO: 7.25 K/UL — SIGNIFICANT CHANGE UP (ref 3.8–10.5)

## 2023-10-07 PROCEDURE — 99233 SBSQ HOSP IP/OBS HIGH 50: CPT

## 2023-10-07 RX ORDER — POTASSIUM CHLORIDE 20 MEQ
20 PACKET (EA) ORAL
Refills: 0 | Status: COMPLETED | OUTPATIENT
Start: 2023-10-07 | End: 2023-10-07

## 2023-10-07 RX ORDER — SODIUM CHLORIDE 9 MG/ML
500 INJECTION INTRAMUSCULAR; INTRAVENOUS; SUBCUTANEOUS
Refills: 0 | Status: DISCONTINUED | OUTPATIENT
Start: 2023-10-07 | End: 2023-10-10

## 2023-10-07 RX ORDER — PIPERACILLIN AND TAZOBACTAM 4; .5 G/20ML; G/20ML
3.38 INJECTION, POWDER, LYOPHILIZED, FOR SOLUTION INTRAVENOUS ONCE
Refills: 0 | Status: COMPLETED | OUTPATIENT
Start: 2023-10-07 | End: 2023-10-07

## 2023-10-07 RX ORDER — CHLORHEXIDINE GLUCONATE 213 G/1000ML
1 SOLUTION TOPICAL DAILY
Refills: 0 | Status: DISCONTINUED | OUTPATIENT
Start: 2023-10-07 | End: 2023-10-10

## 2023-10-07 RX ORDER — OXYCODONE HYDROCHLORIDE 5 MG/1
10 TABLET ORAL EVERY 4 HOURS
Refills: 0 | Status: DISCONTINUED | OUTPATIENT
Start: 2023-10-07 | End: 2023-10-10

## 2023-10-07 RX ORDER — PIPERACILLIN AND TAZOBACTAM 4; .5 G/20ML; G/20ML
3.38 INJECTION, POWDER, LYOPHILIZED, FOR SOLUTION INTRAVENOUS EVERY 8 HOURS
Refills: 0 | Status: DISCONTINUED | OUTPATIENT
Start: 2023-10-08 | End: 2023-10-10

## 2023-10-07 RX ORDER — SODIUM,POTASSIUM PHOSPHATES 278-250MG
1 POWDER IN PACKET (EA) ORAL
Refills: 0 | Status: COMPLETED | OUTPATIENT
Start: 2023-10-07 | End: 2023-10-07

## 2023-10-07 RX ADMIN — Medication 650 MILLIGRAM(S): at 15:22

## 2023-10-07 RX ADMIN — Medication 1 PACKET(S): at 08:55

## 2023-10-07 RX ADMIN — Medication 650 MILLIGRAM(S): at 16:54

## 2023-10-07 RX ADMIN — Medication 650 MILLIGRAM(S): at 05:48

## 2023-10-07 RX ADMIN — Medication 20 MILLIEQUIVALENT(S): at 08:55

## 2023-10-07 RX ADMIN — OXYCODONE HYDROCHLORIDE 5 MILLIGRAM(S): 5 TABLET ORAL at 16:16

## 2023-10-07 RX ADMIN — Medication 1 PACKET(S): at 17:34

## 2023-10-07 RX ADMIN — CEFTRIAXONE 100 MILLIGRAM(S): 500 INJECTION, POWDER, FOR SOLUTION INTRAMUSCULAR; INTRAVENOUS at 06:06

## 2023-10-07 RX ADMIN — BICTEGRAVIR SODIUM, EMTRICITABINE, AND TENOFOVIR ALAFENAMIDE FUMARATE 1 TABLET(S): 30; 120; 15 TABLET ORAL at 12:27

## 2023-10-07 RX ADMIN — Medication 650 MILLIGRAM(S): at 06:16

## 2023-10-07 RX ADMIN — OXYCODONE HYDROCHLORIDE 5 MILLIGRAM(S): 5 TABLET ORAL at 20:38

## 2023-10-07 RX ADMIN — OXYCODONE HYDROCHLORIDE 5 MILLIGRAM(S): 5 TABLET ORAL at 15:27

## 2023-10-07 RX ADMIN — CHLORHEXIDINE GLUCONATE 1 APPLICATION(S): 213 SOLUTION TOPICAL at 12:30

## 2023-10-07 RX ADMIN — PIPERACILLIN AND TAZOBACTAM 25 GRAM(S): 4; .5 INJECTION, POWDER, LYOPHILIZED, FOR SOLUTION INTRAVENOUS at 19:39

## 2023-10-07 RX ADMIN — ENOXAPARIN SODIUM 40 MILLIGRAM(S): 100 INJECTION SUBCUTANEOUS at 21:23

## 2023-10-07 RX ADMIN — OXYCODONE HYDROCHLORIDE 5 MILLIGRAM(S): 5 TABLET ORAL at 08:47

## 2023-10-07 RX ADMIN — Medication 240 MILLIGRAM(S): at 05:57

## 2023-10-07 RX ADMIN — PIPERACILLIN AND TAZOBACTAM 200 GRAM(S): 4; .5 INJECTION, POWDER, LYOPHILIZED, FOR SOLUTION INTRAVENOUS at 16:41

## 2023-10-07 RX ADMIN — Medication 650 MILLIGRAM(S): at 22:06

## 2023-10-07 RX ADMIN — SODIUM CHLORIDE 75 MILLILITER(S): 9 INJECTION INTRAMUSCULAR; INTRAVENOUS; SUBCUTANEOUS at 16:51

## 2023-10-07 RX ADMIN — Medication 100 MILLIGRAM(S): at 05:49

## 2023-10-07 RX ADMIN — Medication 650 MILLIGRAM(S): at 21:23

## 2023-10-07 RX ADMIN — OXYCODONE HYDROCHLORIDE 5 MILLIGRAM(S): 5 TABLET ORAL at 19:38

## 2023-10-07 RX ADMIN — TAMSULOSIN HYDROCHLORIDE 0.4 MILLIGRAM(S): 0.4 CAPSULE ORAL at 21:23

## 2023-10-07 RX ADMIN — OXYCODONE HYDROCHLORIDE 5 MILLIGRAM(S): 5 TABLET ORAL at 09:47

## 2023-10-07 RX ADMIN — Medication 20 MILLIEQUIVALENT(S): at 12:27

## 2023-10-07 RX ADMIN — ATORVASTATIN CALCIUM 10 MILLIGRAM(S): 80 TABLET, FILM COATED ORAL at 21:24

## 2023-10-07 NOTE — CHART NOTE - NSCHARTNOTEFT_GEN_A_CORE
[facility] ACP COVERAGE    Search Terms: David Turcios, 1965  Search Date: 10/07/2023 19:42:33 PM    This report was requested by: Kelly Golden | Reference #: [id number]    Practitioner Count: 4  Pharmacy Count: 2  Current Opioid Prescriptions: 0  Current Benzodiazepine Prescriptions: 1  Current Stimulant Prescriptions: 0      Patient Demographic Information (PDI)       PDI	First Name	Last Name	Birth Date	Gender	Street Address	[facility]	Zip Code  A	David Turcios	1965	Male	[address] [facility] [address]	[place]	[address]  B	David Turcios	1965	Unknown	[address]	[address]  C	David Turcios	1965	Unknown	[address]	[address]  D	David Turcios	1965	Male	[address]	[address]  E	David Turcios	1965	Male	[phone]	[address]    Prescription Information      PDI Filter:    PDI	Current Rx	Drug Type	Rx Written	Rx Dispensed	Drug	Quantity	Days Supply  A	N		08/15/2023	08/15/2023	pregabalin 75 mg capsule	60	30  Prescriber Name Nick Reyes MD  Prescriber NEFTALY # [id number]  Payment Method Medicaid Dispenser [facility]  A	N	O	08/07/2023	08/07/2023	oxycodone-acetaminophen 5-325 mg tablet	42	14  Prescriber Name Andres Smith  Prescriber NEFTALY # [id number]  Payment Method Medicaid Dispenser [facility]  B	N		02/01/2023	03/03/2023	vireo whole flower 0.0 mg - 1mg thc:<0.5mg cbd/inh	1	3  Prescriber Name Naeem Tucker (MSN)  Prescriber NEFTALY # [id number]  Payment Method [place]  Dispenser [facility]  B	N		02/01/2023	03/03/2023	vireo whole flower 0.0 mg - 1mg thc:<0.5mg cbd/inh	1	3  Prescriber Name Naeem Tucker (MSN)  Prescriber NEFTALY # [id number]  Payment Method [place]  Dispenser [facility]  B	N		02/01/2023	03/03/2023	vireo whole flower 0.0 mg - 1mg thc:<0.5mg cbd/inh	1	3  Prescriber Name Naeem Tucker (MSN)  Prescriber NEFTALY # [id number]  Payment Method [place]  Dispenser [facility]  B	N		02/01/2023	03/03/2023	vireo whole flower 0.0 mg - 1mg thc:<0.5mg cbd/inh	1	3  Prescriber Name Naeem Tucker (MSN)  Prescriber NEFTALY # [id number]  Payment Method [place]  Dispenser [facility]  B	N		02/01/2023	02/20/2023	vireo hicolor chews 10mg thc:10mg cbd/chew sully	1	3  Prescriber Name Naeem Tucker (MSN)  Prescriber NEFTALY # [id number]  Payment Method [place]  Dispenser [facility]  B	N		02/01/2023	02/20/2023	vireo whole flower 0.0 mg - 1mg thc:<0.5mg cbd/inh	1	3  Prescriber Name Naeem Tucker (MSN)  Prescriber NEFTALY # [id number]  Payment Method [place]  Dispenser [facility]  B	N		02/01/2023	02/20/2023	vireo whole flower 0.0 mg - 1mg thc:<0.5mg cbd/inh	1	3  Prescriber Name Naeem Tucker (MSN)  Prescriber NEFTALY # [id number]  Payment Method [place]  Dispenser [facility]  B	N		02/01/2023	02/20/2023	vireo whole flower 0.0 mg - 1mg thc:<0.5mg cbd/inh	1	3  Prescriber Name Naeem Tucker (MSN)  Prescriber NEFTALY # [id number]  Payment Method [place]  Dispenser [facility]  B	N		02/01/2023	02/20/2023	vireo whole flower 0.0 mg - 1mg thc:<0.5mg cbd/inh	1	3  Prescriber Name Naeem Tucker (MSN)  Prescriber NEFTALY # [id number]  Payment Method [place]  Dispenser [facility]  C	N		02/01/2023	02/09/2023	vireo hicolor dream blackberry chew 10mg thc:10mg cbn/chew	1	3  Prescriber Name Naeem Tucker (MSN)  Prescriber NEFTALY # [id number]  Payment Method [place]  Dispenser [facility]  C	N		02/01/2023	02/09/2023	vireo whole flower 0.0 mg - 1mg thc:<0.5mg cbd/inh	1	3  Prescriber Name Naeem Tucker (MSN)  Prescriber NEFTALY # [id number]  Payment Method [place]  Dispenser [facility]  C	N		02/01/2023	02/09/2023	vireo whole flower 0.0 mg - 1mg thc:<0.5mg cbd/inh	1	3  Prescriber Name Naeem Tucker (MSN)  Prescriber NEFTALY # [id number]  [facility] Method [facility]  D	Y		09/28/2023	10/01/2023	pregabalin 150 mg capsule	60	30  Prescriber Name Nick Reyes MD  Prescriber NEFTALY # [id number]  Payment Method Medicaid Dispenser [facility] #[id number]  D	Y		09/26/2023	09/27/2023	zolpidem tartrate 10 mg tablet	30	30  Prescriber Name Hanh Yin  Prescriber NEFTALY # [id number]  Payment Method Medicaid Dispenser [facility] #[id number]  D	Y	B	09/26/2023	09/26/2023	alprazolam 1 mg tablet	120	30  Prescriber Name Hanh Yin  Prescriber NEFTALY # [id number]  Payment Method Medicaid Dispenser [facility][id number]  D	N	O	09/14/2023	09/14/2023	oxycodone hcl (ir) 5 mg tablet	6	2  Prescriber Name Nick Mendes)  Prescriber NEFTALY # [id number]  Payment Method Medicaid Dispenser [facility] #[id number]  D	N		03/22/2023	08/24/2023	zolpidem tartrate 10 mg tablet	30	30  Prescriber Name Hanh Yin  Prescriber NEFTALY # [id number]  Payment Method Medicaid Dispenser [facility] #[id number]  D	N	B	08/17/2023	08/18/2023	alprazolam 1 mg tablet	120	30  Prescriber Name Hanh Yin  Prescriber NEFTALY # [id number]  Payment Method Medicaid Dispenser [facility] #[id number]  D	N		02/16/2023	07/24/2023	zolpidem tartrate 10 mg tablet	30	30  Prescriber Name Hanh Yin  Prescriber NEFTALY # [id number]  Payment Method Medicaid Dispenser [facility] #[id number]  D	N		12/28/2022	06/15/2023	zolpidem tartrate 10 mg tablet	30	30  Prescriber Name Hanh Yin  Prescriber NEFTALY # [id number]  Payment Method Medicaid Dispenser [facility] #[id number]  D	N	B	06/13/2023	06/13/2023	alprazolam 1 mg tablet	120	30  Prescriber Name Hanh Yin  Prescriber NEFTALY # [id number]  Payment Method Medicaid Dispenser [facility] #[id number]  D	N	O	06/09/2023	06/09/2023	tramadol hcl 50 mg tablet	30	30  Prescriber Name Jarad Schwartz  Prescriber NEFTALY # [id number]  Payment Method Medicaid Dispenser [facility] #[id number]  D	N	O	05/19/2023	05/19/2023	oxycodone-acetaminophen 5-325 mg tablet	9	3  Prescriber Name Jarad Schwartz  Prescriber NEFTALY # [id number]  Payment Method Medicaid Dispenser [facility][id number]  D	N		03/22/2023	05/17/2023	zolpidem tartrate 10 mg tablet	30	30  Prescriber Name Hanh Yin  Prescriber NEFTALY # [id number]  Payment Method Medicaid Dispenser [facility] #[id number]  D	N	B	05/05/2023	05/05/2023	alprazolam 1 mg tablet	120	30  Prescriber Name Hanh Yin  Prescriber NEFTALY # [id number]  Payment Method Medicaid Dispenser [facility] #[id number]  D	N		03/22/2023	04/19/2023	zolpidem tartrate 10 mg tablet	30	30  Prescriber Name Hanh Yin  Prescriber NEFTALY # [id number]  Payment Method Medicaid Dispenser [facility] #[id number]  D	N		03/22/2023	03/22/2023	zolpidem tartrate 10 mg tablet	30	30  Prescriber Name Hanh Yin  Prescriber NEFTALY # [id number]  Payment Method Medicaid Dispenser [facility][id number]  D	N	B	02/16/2023	03/05/2023	alprazolam 1 mg tablet	120	30  Prescriber Name Hanh Yin  Prescriber NEFTALY # [id number]  Payment Method Medicaid Dispenser [facility] #[id number]  D	N		02/16/2023	02/17/2023	zolpidem tartrate 10 mg tablet	30	30  Prescriber Name Hanh Yin  Prescriber NEFTALY # [id number]  Payment Method Medicaid Dispenser [facility] #[id number]  D	N	B	02/02/2023	02/04/2023	alprazolam 1 mg tablet	120	30  Prescriber Name Hanh Yin  Prescriber NEFTALY # [id number]  Payment Method Medicaid Dispenser [facility] #[id number]  D	N		12/28/2022	12/28/2022	zolpidem tartrate 10 mg tablet	30	30  Prescriber Name Hanh Yin  Prescriber NEFTALY # [id number]  Payment Method Medicaid Dispenser [facility] #[id number]  D	N	B	12/28/2022	12/28/2022	alprazolam 1 mg tablet	120	30  Prescriber Name Hanh Yin  Prescriber NEFTALY # [id number]  Payment Method Medicaid Dispenser [facility][id number]  D	N	B	11/30/2022	12/01/2022	alprazolam 1 mg tablet	120	30  Prescriber Name Hanh Yin  Prescriber NEFTALY # [id number]  Payment Method Medicaid Dispenser [facility] #[id number]  E	N		06/21/2022	11/21/2022	zolpidem tartrate 10 mg tablet	30	30  Prescriber Name Hanh Yin  Prescriber NEFTALY # [id number]  Payment Method [facility]

## 2023-10-08 LAB
ANION GAP SERPL CALC-SCNC: 15 MMOL/L — HIGH (ref 7–14)
BUN SERPL-MCNC: 11 MG/DL — SIGNIFICANT CHANGE UP (ref 7–23)
CALCIUM SERPL-MCNC: 8.7 MG/DL — SIGNIFICANT CHANGE UP (ref 8.4–10.5)
CHLORIDE SERPL-SCNC: 100 MMOL/L — SIGNIFICANT CHANGE UP (ref 98–107)
CO2 SERPL-SCNC: 24 MMOL/L — SIGNIFICANT CHANGE UP (ref 22–31)
CREAT SERPL-MCNC: 0.84 MG/DL — SIGNIFICANT CHANGE UP (ref 0.5–1.3)
EGFR: 102 ML/MIN/1.73M2 — SIGNIFICANT CHANGE UP
GLUCOSE SERPL-MCNC: 100 MG/DL — HIGH (ref 70–99)
HCT VFR BLD CALC: 31.5 % — LOW (ref 39–50)
HGB BLD-MCNC: 10.3 G/DL — LOW (ref 13–17)
MAGNESIUM SERPL-MCNC: 2 MG/DL — SIGNIFICANT CHANGE UP (ref 1.6–2.6)
MCHC RBC-ENTMCNC: 29.1 PG — SIGNIFICANT CHANGE UP (ref 27–34)
MCHC RBC-ENTMCNC: 32.7 GM/DL — SIGNIFICANT CHANGE UP (ref 32–36)
MCV RBC AUTO: 89 FL — SIGNIFICANT CHANGE UP (ref 80–100)
NRBC # BLD: 0 /100 WBCS — SIGNIFICANT CHANGE UP (ref 0–0)
NRBC # FLD: 0 K/UL — SIGNIFICANT CHANGE UP (ref 0–0)
PHOSPHATE SERPL-MCNC: 2.9 MG/DL — SIGNIFICANT CHANGE UP (ref 2.5–4.5)
PLATELET # BLD AUTO: 205 K/UL — SIGNIFICANT CHANGE UP (ref 150–400)
POTASSIUM SERPL-MCNC: 3.8 MMOL/L — SIGNIFICANT CHANGE UP (ref 3.5–5.3)
POTASSIUM SERPL-SCNC: 3.8 MMOL/L — SIGNIFICANT CHANGE UP (ref 3.5–5.3)
RBC # BLD: 3.54 M/UL — LOW (ref 4.2–5.8)
RBC # FLD: 13.7 % — SIGNIFICANT CHANGE UP (ref 10.3–14.5)
SODIUM SERPL-SCNC: 139 MMOL/L — SIGNIFICANT CHANGE UP (ref 135–145)
WBC # BLD: 7.49 K/UL — SIGNIFICANT CHANGE UP (ref 3.8–10.5)
WBC # FLD AUTO: 7.49 K/UL — SIGNIFICANT CHANGE UP (ref 3.8–10.5)

## 2023-10-08 PROCEDURE — 99233 SBSQ HOSP IP/OBS HIGH 50: CPT

## 2023-10-08 RX ADMIN — PIPERACILLIN AND TAZOBACTAM 25 GRAM(S): 4; .5 INJECTION, POWDER, LYOPHILIZED, FOR SOLUTION INTRAVENOUS at 13:32

## 2023-10-08 RX ADMIN — PIPERACILLIN AND TAZOBACTAM 25 GRAM(S): 4; .5 INJECTION, POWDER, LYOPHILIZED, FOR SOLUTION INTRAVENOUS at 21:03

## 2023-10-08 RX ADMIN — OXYCODONE HYDROCHLORIDE 10 MILLIGRAM(S): 5 TABLET ORAL at 13:31

## 2023-10-08 RX ADMIN — Medication 650 MILLIGRAM(S): at 06:06

## 2023-10-08 RX ADMIN — BICTEGRAVIR SODIUM, EMTRICITABINE, AND TENOFOVIR ALAFENAMIDE FUMARATE 1 TABLET(S): 30; 120; 15 TABLET ORAL at 12:06

## 2023-10-08 RX ADMIN — OXYCODONE HYDROCHLORIDE 10 MILLIGRAM(S): 5 TABLET ORAL at 08:52

## 2023-10-08 RX ADMIN — TAMSULOSIN HYDROCHLORIDE 0.4 MILLIGRAM(S): 0.4 CAPSULE ORAL at 21:02

## 2023-10-08 RX ADMIN — Medication 240 MILLIGRAM(S): at 05:35

## 2023-10-08 RX ADMIN — CHLORHEXIDINE GLUCONATE 1 APPLICATION(S): 213 SOLUTION TOPICAL at 12:06

## 2023-10-08 RX ADMIN — SODIUM CHLORIDE 75 MILLILITER(S): 9 INJECTION INTRAMUSCULAR; INTRAVENOUS; SUBCUTANEOUS at 06:02

## 2023-10-08 RX ADMIN — OXYCODONE HYDROCHLORIDE 10 MILLIGRAM(S): 5 TABLET ORAL at 04:36

## 2023-10-08 RX ADMIN — OXYCODONE HYDROCHLORIDE 10 MILLIGRAM(S): 5 TABLET ORAL at 14:31

## 2023-10-08 RX ADMIN — OXYCODONE HYDROCHLORIDE 10 MILLIGRAM(S): 5 TABLET ORAL at 22:45

## 2023-10-08 RX ADMIN — OXYCODONE HYDROCHLORIDE 10 MILLIGRAM(S): 5 TABLET ORAL at 19:09

## 2023-10-08 RX ADMIN — OXYCODONE HYDROCHLORIDE 10 MILLIGRAM(S): 5 TABLET ORAL at 23:45

## 2023-10-08 RX ADMIN — ENOXAPARIN SODIUM 40 MILLIGRAM(S): 100 INJECTION SUBCUTANEOUS at 21:02

## 2023-10-08 RX ADMIN — Medication 650 MILLIGRAM(S): at 06:50

## 2023-10-08 RX ADMIN — PIPERACILLIN AND TAZOBACTAM 25 GRAM(S): 4; .5 INJECTION, POWDER, LYOPHILIZED, FOR SOLUTION INTRAVENOUS at 05:34

## 2023-10-08 RX ADMIN — OXYCODONE HYDROCHLORIDE 10 MILLIGRAM(S): 5 TABLET ORAL at 03:36

## 2023-10-08 RX ADMIN — OXYCODONE HYDROCHLORIDE 10 MILLIGRAM(S): 5 TABLET ORAL at 09:52

## 2023-10-08 RX ADMIN — Medication 100 MILLIGRAM(S): at 05:35

## 2023-10-08 RX ADMIN — ATORVASTATIN CALCIUM 10 MILLIGRAM(S): 80 TABLET, FILM COATED ORAL at 21:02

## 2023-10-08 RX ADMIN — OXYCODONE HYDROCHLORIDE 10 MILLIGRAM(S): 5 TABLET ORAL at 18:09

## 2023-10-08 NOTE — PROGRESS NOTE ADULT - PROBLEM SELECTOR PLAN 1
Febrile 102.4, , wbc 11.9. Positive U/A, with urinary symptoms.  CXR clear, CT abdomen with concerns for ascending urinary tract infection  -c/w Ceftriaxone, previous cultures reviewed with pansensitive Proteus  - UCx with GNR, BCx NGTD  -monitor fever curve, trend wbc/ vital signs

## 2023-10-09 LAB
-  AMIKACIN: SIGNIFICANT CHANGE UP
-  AMOXICILLIN/CLAVULANIC ACID: SIGNIFICANT CHANGE UP
-  AMPICILLIN/SULBACTAM: SIGNIFICANT CHANGE UP
-  AMPICILLIN: SIGNIFICANT CHANGE UP
-  AZTREONAM: SIGNIFICANT CHANGE UP
-  CEFAZOLIN: SIGNIFICANT CHANGE UP
-  CEFEPIME: SIGNIFICANT CHANGE UP
-  CEFOXITIN: SIGNIFICANT CHANGE UP
-  CEFTRIAXONE: SIGNIFICANT CHANGE UP
-  CEFUROXIME: SIGNIFICANT CHANGE UP
-  CIPROFLOXACIN: SIGNIFICANT CHANGE UP
-  ERTAPENEM: SIGNIFICANT CHANGE UP
-  GENTAMICIN: SIGNIFICANT CHANGE UP
-  LEVOFLOXACIN: SIGNIFICANT CHANGE UP
-  MEROPENEM: SIGNIFICANT CHANGE UP
-  NITROFURANTOIN: SIGNIFICANT CHANGE UP
-  PIPERACILLIN/TAZOBACTAM: SIGNIFICANT CHANGE UP
-  TOBRAMYCIN: SIGNIFICANT CHANGE UP
-  TRIMETHOPRIM/SULFAMETHOXAZOLE: SIGNIFICANT CHANGE UP
ANION GAP SERPL CALC-SCNC: 11 MMOL/L — SIGNIFICANT CHANGE UP (ref 7–14)
BUN SERPL-MCNC: 9 MG/DL — SIGNIFICANT CHANGE UP (ref 7–23)
CALCIUM SERPL-MCNC: 8.7 MG/DL — SIGNIFICANT CHANGE UP (ref 8.4–10.5)
CHLORIDE SERPL-SCNC: 103 MMOL/L — SIGNIFICANT CHANGE UP (ref 98–107)
CO2 SERPL-SCNC: 26 MMOL/L — SIGNIFICANT CHANGE UP (ref 22–31)
CREAT SERPL-MCNC: 0.82 MG/DL — SIGNIFICANT CHANGE UP (ref 0.5–1.3)
CULTURE RESULTS: SIGNIFICANT CHANGE UP
EGFR: 102 ML/MIN/1.73M2 — SIGNIFICANT CHANGE UP
GLUCOSE SERPL-MCNC: 114 MG/DL — HIGH (ref 70–99)
HCT VFR BLD CALC: 30.5 % — LOW (ref 39–50)
HGB BLD-MCNC: 10.2 G/DL — LOW (ref 13–17)
MAGNESIUM SERPL-MCNC: 2 MG/DL — SIGNIFICANT CHANGE UP (ref 1.6–2.6)
MCHC RBC-ENTMCNC: 29.5 PG — SIGNIFICANT CHANGE UP (ref 27–34)
MCHC RBC-ENTMCNC: 33.4 GM/DL — SIGNIFICANT CHANGE UP (ref 32–36)
MCV RBC AUTO: 88.2 FL — SIGNIFICANT CHANGE UP (ref 80–100)
METHOD TYPE: SIGNIFICANT CHANGE UP
NRBC # BLD: 0 /100 WBCS — SIGNIFICANT CHANGE UP (ref 0–0)
NRBC # FLD: 0 K/UL — SIGNIFICANT CHANGE UP (ref 0–0)
ORGANISM # SPEC MICROSCOPIC CNT: SIGNIFICANT CHANGE UP
ORGANISM # SPEC MICROSCOPIC CNT: SIGNIFICANT CHANGE UP
PHOSPHATE SERPL-MCNC: 3.4 MG/DL — SIGNIFICANT CHANGE UP (ref 2.5–4.5)
PLATELET # BLD AUTO: 220 K/UL — SIGNIFICANT CHANGE UP (ref 150–400)
POTASSIUM SERPL-MCNC: 3.6 MMOL/L — SIGNIFICANT CHANGE UP (ref 3.5–5.3)
POTASSIUM SERPL-SCNC: 3.6 MMOL/L — SIGNIFICANT CHANGE UP (ref 3.5–5.3)
RBC # BLD: 3.46 M/UL — LOW (ref 4.2–5.8)
RBC # FLD: 13.5 % — SIGNIFICANT CHANGE UP (ref 10.3–14.5)
SODIUM SERPL-SCNC: 140 MMOL/L — SIGNIFICANT CHANGE UP (ref 135–145)
SPECIMEN SOURCE: SIGNIFICANT CHANGE UP
WBC # BLD: 5.37 K/UL — SIGNIFICANT CHANGE UP (ref 3.8–10.5)
WBC # FLD AUTO: 5.37 K/UL — SIGNIFICANT CHANGE UP (ref 3.8–10.5)

## 2023-10-09 PROCEDURE — 99222 1ST HOSP IP/OBS MODERATE 55: CPT

## 2023-10-09 PROCEDURE — 99232 SBSQ HOSP IP/OBS MODERATE 35: CPT

## 2023-10-09 RX ORDER — LACTOBACILLUS ACIDOPHILUS 100MM CELL
1 CAPSULE ORAL DAILY
Refills: 0 | Status: DISCONTINUED | OUTPATIENT
Start: 2023-10-09 | End: 2023-10-10

## 2023-10-09 RX ADMIN — PIPERACILLIN AND TAZOBACTAM 25 GRAM(S): 4; .5 INJECTION, POWDER, LYOPHILIZED, FOR SOLUTION INTRAVENOUS at 13:44

## 2023-10-09 RX ADMIN — OXYCODONE HYDROCHLORIDE 10 MILLIGRAM(S): 5 TABLET ORAL at 05:58

## 2023-10-09 RX ADMIN — OXYCODONE HYDROCHLORIDE 10 MILLIGRAM(S): 5 TABLET ORAL at 10:35

## 2023-10-09 RX ADMIN — BICTEGRAVIR SODIUM, EMTRICITABINE, AND TENOFOVIR ALAFENAMIDE FUMARATE 1 TABLET(S): 30; 120; 15 TABLET ORAL at 16:57

## 2023-10-09 RX ADMIN — ONDANSETRON 4 MILLIGRAM(S): 8 TABLET, FILM COATED ORAL at 12:07

## 2023-10-09 RX ADMIN — Medication 100 MILLIGRAM(S): at 05:01

## 2023-10-09 RX ADMIN — ENOXAPARIN SODIUM 40 MILLIGRAM(S): 100 INJECTION SUBCUTANEOUS at 23:36

## 2023-10-09 RX ADMIN — OXYCODONE HYDROCHLORIDE 10 MILLIGRAM(S): 5 TABLET ORAL at 17:30

## 2023-10-09 RX ADMIN — OXYCODONE HYDROCHLORIDE 10 MILLIGRAM(S): 5 TABLET ORAL at 16:56

## 2023-10-09 RX ADMIN — OXYCODONE HYDROCHLORIDE 10 MILLIGRAM(S): 5 TABLET ORAL at 05:01

## 2023-10-09 RX ADMIN — Medication 1 TABLET(S): at 16:57

## 2023-10-09 RX ADMIN — SODIUM CHLORIDE 75 MILLILITER(S): 9 INJECTION INTRAMUSCULAR; INTRAVENOUS; SUBCUTANEOUS at 13:45

## 2023-10-09 RX ADMIN — CHLORHEXIDINE GLUCONATE 1 APPLICATION(S): 213 SOLUTION TOPICAL at 12:07

## 2023-10-09 RX ADMIN — Medication 240 MILLIGRAM(S): at 05:51

## 2023-10-09 RX ADMIN — TAMSULOSIN HYDROCHLORIDE 0.4 MILLIGRAM(S): 0.4 CAPSULE ORAL at 23:37

## 2023-10-09 RX ADMIN — PIPERACILLIN AND TAZOBACTAM 25 GRAM(S): 4; .5 INJECTION, POWDER, LYOPHILIZED, FOR SOLUTION INTRAVENOUS at 05:01

## 2023-10-09 RX ADMIN — PIPERACILLIN AND TAZOBACTAM 25 GRAM(S): 4; .5 INJECTION, POWDER, LYOPHILIZED, FOR SOLUTION INTRAVENOUS at 23:36

## 2023-10-09 RX ADMIN — ATORVASTATIN CALCIUM 10 MILLIGRAM(S): 80 TABLET, FILM COATED ORAL at 23:37

## 2023-10-09 RX ADMIN — OXYCODONE HYDROCHLORIDE 10 MILLIGRAM(S): 5 TABLET ORAL at 09:55

## 2023-10-09 NOTE — PROGRESS NOTE ADULT - PROBLEM SELECTOR PLAN 1
Febrile 102.4, , wbc 11.9. Positive U/A, with urinary symptoms.  CXR clear, CT abdomen with concerns for ascending urinary tract infection  -c/w Ceftriaxone, previous cultures reviewed with pansensitive Proteus  - UCx with GNR, BCx NGTD  -monitor fever curve, trend wbc/ vital signs Febrile 102.4, , wbc 11.9. Positive U/A, with urinary symptoms.  CXR clear, CT abdomen with concerns for ascending urinary tract infection  -c/w Ceftriaxone, previous cultures reviewed with pansensitive Proteus  - UCx with GNR, BCx NGTD, can treat with augmentin on dc   -monitor fever curve, trend wbc/ vital signs  patient w/ n/v now, will wait for resolution prior to discharge

## 2023-10-09 NOTE — PROGRESS NOTE ADULT - SUBJECTIVE AND OBJECTIVE BOX
Hilton Collazo MD  Academic Hospitalist  Pager [phone]  Email: [email or web address]  Available on [employer] Teams        PROGRESS NOTE:     Patient is a 58y old  Male who presents with a chief complaint of Urosepsis (09 Oct 2023 13:45)      SUBJECTIVE / OVERNIGHT EVENTS:  Patient seen and examined this morning. Some episodes of n/v.  ADDITIONAL REVIEW OF SYSTEMS:  last fever yesterday morning      MEDICATIONS  (STANDING):  atorvastatin 10 milliGRAM(s) Oral at bedtime  bictegravir 50 mG/emtricitabine 200 mG/tenofovir alafenamide 25 mG (BIKTARVY) 1 Tablet(s) Oral daily  chlorhexidine 2% Cloths 1 Application(s) Topical daily  diltiazem    milliGRAM(s) Oral daily  enoxaparin Injectable 40 milliGRAM(s) SubCutaneous every 24 hours  influenza   Vaccine 0.5 milliLiter(s) IntraMuscular once  lactobacillus acidophilus 1 Tablet(s) Oral daily  metoprolol succinate  milliGRAM(s) Oral daily  piperacillin/tazobactam IVPB.. 3.375 Gram(s) IV Intermittent every 8 hours  sodium chloride 0.9%. 500 milliLiter(s) (75 mL/Hr) IV Continuous <Continuous>  tamsulosin 0.4 milliGRAM(s) Oral at bedtime    MEDICATIONS  (PRN):  acetaminophen     Tablet .. 650 milliGRAM(s) Oral every 6 hours PRN Temp greater or equal to 38C (100.4F), Mild Pain (1 - 3)  ALPRAZolam 1 milliGRAM(s) Oral three times a day PRN anxiety  melatonin 3 milliGRAM(s) Oral at bedtime PRN Insomnia  ondansetron Injectable 4 milliGRAM(s) IV Push every 8 hours PRN Nausea and/or Vomiting  oxyCODONE    IR 10 milliGRAM(s) Oral every 4 hours PRN Severe Pain (7 - 10)      CAPILLARY BLOOD GLUCOSE        I&O's Summary    08 Oct 2023 07:01  -  09 Oct 2023 07:00  --------------------------------------------------------  IN: 1575 mL / OUT: 0 mL / NET: 1575 mL        PHYSICAL EXAM:  Vital Signs Last 24 Hrs  T(C): 37.4 (09 Oct 2023 13:02), Max: 37.5 (08 Oct 2023 21:41)  T(F): 99.3 (09 Oct 2023 13:02), Max: 99.5 (08 Oct 2023 21:41)  HR: 89 (09 Oct 2023 13:02) (84 - 90)  BP: 138/87 (09 Oct 2023 13:02) (126/82 - 142/97)  BP(mean): --  RR: 17 (09 Oct 2023 13:02) (17 - 18)  SpO2: 95% (09 Oct 2023 13:02) (95% - 97%)    Parameters below as of 09 Oct 2023 13:02  Patient On (Oxygen Delivery Method): room air        CONSTITUTIONAL: NAD, pleasant  RESPIRATORY: Normal respiratory effort; no respiratory distress, CTAB  CARDIOVASCULAR: No visible JVD, No lower extremity edema; S1S2, no m,r,g  ABDOMEN: Not guarding, does not appear distended, BS+  MUSCLOSKELETAL: no clubbing or cyanosis of digits; no joint swelling   PSYCH: AOx3    LABS:                        10.2   5.37  )-----------( 220      ( 09 Oct 2023 04:26 )             30.5     10-09    140  |  103  |  9   ----------------------------<  114<H>  3.6   |  26  |  0.82    Ca    8.7      09 Oct 2023 04:26  Phos  3.4     10-09  Mg     2.00     10-09            Urinalysis Basic - ( 09 Oct 2023 04:26 )    Color: x / Appearance: x / SG: x / pH: x  Gluc: 114 mg/dL / Ketone: x  / Bili: x / Urobili: x   Blood: x / Protein: x / Nitrite: x   Leuk Esterase: x / RBC: x / WBC x   Sq Epi: x / Non Sq Epi: x / Bacteria: x          RADIOLOGY & ADDITIONAL TESTS:  Results Reviewed:   Imaging Personally Reviewed:  Electrocardiogram Personally Reviewed:    COORDINATION OF CARE:  Care Discussed with Consultants/Other Providers [Y/N]: Case discussed during interdisciplinary rounds with social work and case management  Prior or Outpatient Records Reviewed [Y/N]:  
DAILY PROGRESS NOTE:         24 hr events:  naeon  pvr 0cc     Objective:    Vital Signs Last 24 Hrs  T(C): 37.4 (07 Oct 2023 11:11), Max: 39.4 (07 Oct 2023 05:50)  T(F): 99.4 (07 Oct 2023 11:11), Max: 103 (07 Oct 2023 05:50)  HR: 101 (07 Oct 2023 09:00) (101 - 129)  BP: 128/92 (07 Oct 2023 09:00) (111/80 - 163/89)  BP(mean): --  RR: 16 (07 Oct 2023 09:00) (16 - 20)  SpO2: 95% (07 Oct 2023 09:00) (95% - 100%)    Parameters below as of 07 Oct 2023 09:00  Patient On (Oxygen Delivery Method): room air        I&O's Detail    07 Oct 2023 07:01  -  07 Oct 2023 12:50  --------------------------------------------------------  IN:    Oral Fluid: 200 mL  Total IN: 200 mL    OUT:    Voided (mL): 300 mL  Total OUT: 300 mL    Total NET: -100 mL          Physical Exam:    General: NAD, well-nourished  Resp: Breathing comfortably on RA  CV: regular rate   : voiding, pvr 0cc     Laboratory Results:                          10.2   7.25  )-----------( 170      ( 07 Oct 2023 06:17 )             31.4     10-07    140  |  105  |  12  ----------------------------<  109<H>  3.5   |  26  |  0.84    Ca    9.0      07 Oct 2023 06:17  Phos  2.2     10-07  Mg     1.80     10-07    TPro  6.0  /  Alb  3.2<L>  /  TBili  0.3  /  DBili  x   /  AST  17  /  ALT  18  /  AlkPhos  69  10-06    PT/INR - ( 06 Oct 2023 01:43 )   PT: 13.6 sec;   INR: 1.21 ratio         PTT - ( 06 Oct 2023 01:43 )  PTT:31.5 sec  Urinalysis Basic - ( 07 Oct 2023 06:17 )    Color: x / Appearance: x / SG: x / pH: x  Gluc: 109 mg/dL / Ketone: x  / Bili: x / Urobili: x   Blood: x / Protein: x / Nitrite: x   Leuk Esterase: x / RBC: x / WBC x   Sq Epi: x / Non Sq Epi: x / Bacteria: x                    
Patient is a 58y old  Male who presents with a chief complaint of Urosepsis (07 Oct 2023 12:50)      SUBJECTIVE / OVERNIGHT EVENTS: No acute events overnight. Pt has no new complaints    ADDITIONAL REVIEW OF SYSTEMS:    MEDICATIONS  (STANDING):  atorvastatin 10 milliGRAM(s) Oral at bedtime  bictegravir 50 mG/emtricitabine 200 mG/tenofovir alafenamide 25 mG (BIKTARVY) 1 Tablet(s) Oral daily  cefTRIAXone   IVPB 1000 milliGRAM(s) IV Intermittent every 24 hours  chlorhexidine 2% Cloths 1 Application(s) Topical daily  diltiazem    milliGRAM(s) Oral daily  enoxaparin Injectable 40 milliGRAM(s) SubCutaneous every 24 hours  influenza   Vaccine 0.5 milliLiter(s) IntraMuscular once  metoprolol succinate  milliGRAM(s) Oral daily  potassium phosphate / sodium phosphate Powder (PHOS-NaK) 1 Packet(s) Oral two times a day  tamsulosin 0.4 milliGRAM(s) Oral at bedtime    MEDICATIONS  (PRN):  acetaminophen     Tablet .. 650 milliGRAM(s) Oral every 6 hours PRN Temp greater or equal to 38C (100.4F), Mild Pain (1 - 3)  ALPRAZolam 1 milliGRAM(s) Oral three times a day PRN anxiety  aluminum hydroxide/magnesium hydroxide/simethicone Suspension 30 milliLiter(s) Oral every 4 hours PRN Dyspepsia  melatonin 3 milliGRAM(s) Oral at bedtime PRN Insomnia  ondansetron Injectable 4 milliGRAM(s) IV Push every 8 hours PRN Nausea and/or Vomiting  oxyCODONE    IR 5 milliGRAM(s) Oral every 4 hours PRN moderate/severe pain      CAPILLARY BLOOD GLUCOSE        I&O's Summary    07 Oct 2023 07:01  -  07 Oct 2023 13:14  --------------------------------------------------------  IN: 200 mL / OUT: 300 mL / NET: -100 mL        PHYSICAL EXAM:  Vital Signs Last 24 Hrs  T(C): 37.4 (07 Oct 2023 11:11), Max: 39.4 (07 Oct 2023 05:50)  T(F): 99.4 (07 Oct 2023 11:11), Max: 103 (07 Oct 2023 05:50)  HR: 101 (07 Oct 2023 09:00) (101 - 129)  BP: 128/92 (07 Oct 2023 09:00) (111/80 - 163/89)  BP(mean): --  RR: 16 (07 Oct 2023 09:00) (16 - 20)  SpO2: 95% (07 Oct 2023 09:00) (95% - 100%)    Parameters below as of 07 Oct 2023 09:00  Patient On (Oxygen Delivery Method): room air      CONSTITUTIONAL: NAD  EYES: PERRLA; conjunctiva and sclera clear  ENMT: Moist oral mucosa, no pharyngeal injection or exudates; normal dentition  NECK: Supple, no palpable masses; no thyromegaly  RESPIRATORY: Normal respiratory effort; lungs are clear to auscultation bilaterally  CARDIOVASCULAR: Regular rate and rhythm, normal S1 and S2, no murmur/rub/gallop; No lower extremity edema; Peripheral pulses are 2+ bilaterally  ABDOMEN: Nontender to palpation, normoactive bowel sounds, no rebound/guarding; No hepatosplenomegaly  MUSCULOSKELETAL:  Normal gait; no clubbing or cyanosis of digits; no joint swelling or tenderness to palpation  PSYCH: A+O to person, place, and time; affect appropriate  NEUROLOGY: CN 2-12 are intact and symmetric; no gross sensory deficits   SKIN: No rashes; no palpable lesions    LABS:                        10.2   7.25  )-----------( 170      ( 07 Oct 2023 06:17 )             31.4     10-07    140  |  105  |  12  ----------------------------<  109<H>  3.5   |  26  |  0.84    Ca    9.0      07 Oct 2023 06:17  Phos  2.2     10-07  Mg     1.80     10-07    TPro  6.0  /  Alb  3.2<L>  /  TBili  0.3  /  DBili  x   /  AST  17  /  ALT  18  /  AlkPhos  69  10-06    PT/INR - ( 06 Oct 2023 01:43 )   PT: 13.6 sec;   INR: 1.21 ratio         PTT - ( 06 Oct 2023 01:43 )  PTT:31.5 sec      Urinalysis Basic - ( 07 Oct 2023 06:17 )    Color: x / Appearance: x / SG: x / pH: x  Gluc: 109 mg/dL / Ketone: x  / Bili: x / Urobili: x   Blood: x / Protein: x / Nitrite: x   Leuk Esterase: x / RBC: x / WBC x   Sq Epi: x / Non Sq Epi: x / Bacteria: x        Culture - Blood (collected 06 Oct 2023 01:43)  Source: .Blood Blood-Peripheral  Preliminary Report (07 Oct 2023 07:02):    No growth at 24 hours    Culture - Blood (collected 06 Oct 2023 01:30)  Source: .Blood Blood-Peripheral  Preliminary Report (07 Oct 2023 07:02):    No growth at 24 hours        RADIOLOGY & ADDITIONAL TESTS:  Results Reviewed:   Imaging Personally Reviewed:  Electrocardiogram Personally Reviewed:    COORDINATION OF CARE:  Care Discussed with Consultants/Other Providers [Y/N]:  Prior or Outpatient Records Reviewed [Y/N]:  
Patient is a 58y old  Male who presents with a chief complaint of Urosepsis (07 Oct 2023 13:14)      SUBJECTIVE / OVERNIGHT EVENTS: No acute events overnight. Pt has no new complaints. Still having low grade fevers     ADDITIONAL REVIEW OF SYSTEMS:    MEDICATIONS  (STANDING):  atorvastatin 10 milliGRAM(s) Oral at bedtime  bictegravir 50 mG/emtricitabine 200 mG/tenofovir alafenamide 25 mG (BIKTARVY) 1 Tablet(s) Oral daily  chlorhexidine 2% Cloths 1 Application(s) Topical daily  diltiazem    milliGRAM(s) Oral daily  enoxaparin Injectable 40 milliGRAM(s) SubCutaneous every 24 hours  influenza   Vaccine 0.5 milliLiter(s) IntraMuscular once  metoprolol succinate  milliGRAM(s) Oral daily  piperacillin/tazobactam IVPB.. 3.375 Gram(s) IV Intermittent every 8 hours  sodium chloride 0.9%. 500 milliLiter(s) (75 mL/Hr) IV Continuous <Continuous>  tamsulosin 0.4 milliGRAM(s) Oral at bedtime    MEDICATIONS  (PRN):  acetaminophen     Tablet .. 650 milliGRAM(s) Oral every 6 hours PRN Temp greater or equal to 38C (100.4F), Mild Pain (1 - 3)  ALPRAZolam 1 milliGRAM(s) Oral three times a day PRN anxiety  aluminum hydroxide/magnesium hydroxide/simethicone Suspension 30 milliLiter(s) Oral every 4 hours PRN Dyspepsia  melatonin 3 milliGRAM(s) Oral at bedtime PRN Insomnia  ondansetron Injectable 4 milliGRAM(s) IV Push every 8 hours PRN Nausea and/or Vomiting  oxyCODONE    IR 10 milliGRAM(s) Oral every 4 hours PRN Severe Pain (7 - 10)      CAPILLARY BLOOD GLUCOSE        I&O's Summary    07 Oct 2023 07:01  -  08 Oct 2023 07:00  --------------------------------------------------------  IN: 1225 mL / OUT: 300 mL / NET: 925 mL    08 Oct 2023 07:01  -  08 Oct 2023 14:08  --------------------------------------------------------  IN: 250 mL / OUT: 0 mL / NET: 250 mL        PHYSICAL EXAM:  Vital Signs Last 24 Hrs  T(C): 36.9 (08 Oct 2023 12:33), Max: 39.4 (07 Oct 2023 15:22)  T(F): 98.5 (08 Oct 2023 12:33), Max: 103 (07 Oct 2023 15:22)  HR: 93 (08 Oct 2023 12:33) (93 - 120)  BP: 119/79 (08 Oct 2023 12:33) (119/79 - 134/86)  BP(mean): --  RR: 18 (08 Oct 2023 12:33) (16 - 18)  SpO2: 96% (08 Oct 2023 12:33) (96% - 99%)    Parameters below as of 08 Oct 2023 12:33  Patient On (Oxygen Delivery Method): room air      CONSTITUTIONAL: NAD  RESPIRATORY: Normal respiratory effort; lungs are clear to auscultation bilaterally  CARDIOVASCULAR: Regular rate and rhythm, normal S1 and S2, no murmur/rub/gallop; No lower extremity edema; Peripheral pulses are 2+ bilaterally  ABDOMEN: Nontender to palpation, normoactive bowel sounds, no rebound/guarding; No hepatosplenomegaly  MUSCULOSKELETAL:  No clubbing or cyanosis of digits; no joint swelling or tenderness to palpation  PSYCH: A+O to person, place, and time; affect appropriate  NEUROLOGY: CN 2-12 are intact and symmetric; no gross sensory deficits   SKIN: No rashes; no palpable lesions    LABS:                        10.3   7.49  )-----------( 205      ( 08 Oct 2023 05:27 )             31.5     10-08    139  |  100  |  11  ----------------------------<  100<H>  3.8   |  24  |  0.84    Ca    8.7      08 Oct 2023 05:27  Phos  2.9     10-08  Mg     2.00     10-08            Urinalysis Basic - ( 08 Oct 2023 05:27 )    Color: x / Appearance: x / SG: x / pH: x  Gluc: 100 mg/dL / Ketone: x  / Bili: x / Urobili: x   Blood: x / Protein: x / Nitrite: x   Leuk Esterase: x / RBC: x / WBC x   Sq Epi: x / Non Sq Epi: x / Bacteria: x        Culture - Urine (collected 06 Oct 2023 02:39)  Source: Clean Catch Clean Catch (Midstream)  Preliminary Report (08 Oct 2023 14:04):    >100,000 CFU/ml Proteus mirabilis    Culture - Blood (collected 06 Oct 2023 01:43)  Source: .Blood Blood-Peripheral  Preliminary Report (08 Oct 2023 07:01):    No growth at 48 Hours    Culture - Blood (collected 06 Oct 2023 01:30)  Source: .Blood Blood-Peripheral  Preliminary Report (08 Oct 2023 07:01):    No growth at 48 Hours        RADIOLOGY & ADDITIONAL TESTS:  Results Reviewed:   Imaging Personally Reviewed:  Electrocardiogram Personally Reviewed:    COORDINATION OF CARE:  Care Discussed with Consultants/Other Providers [Y/N]:  Prior or Outpatient Records Reviewed [Y/N]:

## 2023-10-09 NOTE — CONSULT NOTE ADULT - ASSESSMENT
57 m with HTN, HIV on Biktarvy, prostate ca 2016 s/p radiation, anal cancer (dx 4/2021) s/p radiation and chemotherapy (last 8/2021) complicated by an ulcer, abscess and rectal bleeding  s/p loop colostomy 3/30/22 and then s/p APR with end colostomy creation, VRAM flap closure, 8/24/22 found to have a large abscess as well, abscess cx with clostridium, bacteroides  again was admitted for GIB and pelvic/groin abscess s/p drain placement and CT also showed L ischial tuberosity osteo so pt received zosyn in the hospital and was discharged with PO levo and flagyl to finish a 6 week course, he was admitted 11/2022 with hematuria and hydro, urine cx and AFB were negative, s/p b/l nephrostomy, and then had laser lithotripsy and stent placement 1/25/23, the nephrostomies were clamped and removed now with b/l stents, he still had severe pain while sitting down at the flap site, dysuria and penile/scrotal numbness, pt had multiple ER visits for pain but urine cx negative, he c/o penile numbness 8/2023 so LS spine and pelvic MRI was done 9/22 which showed 1.5 cm fat rim-enhancing collection at the left posterolateral aspect of the perineal flap most likely representing a small abscess., then pt developed fever, abd pain, back pain and dysuria so came to the hospital, the penile numbness has resolved  here febrile to 102.4, tachy, wbc 11.9, u/a positive  CTL: redemonstrated moderate hydronephrosis with bilateral urothelial thickening and adjacent inflammatory stranding, which may be due to reactive inflammation or ascending UTI  blood cx negative  urine cx: proteus mirabilis (R to nitrofurantoin)    HIV pt with prostate and anal ca s/p chemo and RT c/o abscess and rectal bleeding s/p ostomy and flap closure and again large abscess with clostridium and bacteroides and ischial tuberosity osteo, s/p long courses of antibioticss, also nephrolithiasis and hydro s/p nephrostomy before now only with stent, had penile numbness so MRi was done 9/22 which showed a 1.3 cm colleciton at the L posterolateral perianal flap which could be an abscess, but was afebrile then, now with fever, dysuria and proteus UTI    * blood cx negative and pt clinically better  * c/w zosyn while in the hospital  * on discharge switch to Augmentin 875 bid to complete a total 14 day course    The above assessment and plan was discussed with the primary team    Denise Gordon MD  contact on teams  After 5pm and on weekends call [phone]

## 2023-10-09 NOTE — CONSULT NOTE ADULT - SUBJECTIVE AND OBJECTIVE BOX
HPI:  57 m with HTN, HIV on Biktarvy, prostate ca 2016 s/p radiation, anal cancer (dx 4/2021) s/p radiation and chemotherapy (last 8/2021) complicated by an ulcer, abscess and rectal bleeding  s/p loop colostomy 3/30/22 and then s/p APR with end colostomy creation, VRAM flap closure, 8/24/22 found to have a large abscess as well, abscess cx with clostridium, bacteroides  again was admitted for GIB and pelvic/groin abscess s/p drain placement and CT also showed L ischial tuberosity osteo so pt received zosyn in the hospital and was discharged with PO levo and flagyl to finish a 6 week course, he was admitted 11/2022 with hematuria and hydro, urine cx and AFB were negative, s/p b/l nephrostomy, and then had laser lithotripsy and stent placement 1/25/23, the nephrostomies were clamped and removed now with b/l stents, he still had severe pain while sitting down at the flap site, dysuria and penile/scrotal numbness, pt had multiple ER visits for pain but urine cx negative, he c/o penile numbness 8/2023 so LS spine and pelvic MRI was done 9/22 which showed 1.5 cm fat rim-enhancing collection at the left posterolateral aspect of the perineal flap most likely representing a small abscess., then pt developed fever, abd pain, back pain and dysuria so came to the hospital, the penile numbness has resolved  here febrile to 102.4, tachy, wbc 11.9, u/a positive  CTL: redemonstrated moderate hydronephrosis with bilateral urothelial thickening and adjacent inflammatory stranding, which may be due to reactive inflammation or ascending UTI  blood cx negative  urine cx: proteus mirabilis (R to nitrofurantoin)    PAST MEDICAL & SURGICAL HISTORY:  HTN (hypertension)      HLD (hyperlipidemia)      HIV infection      Depressive disorder      Anxiety      Prostate cancer      Anal cancer      Diverticulosis      Lung cancer      Anal lesion      S/P colostomy          Allergies    No Known Allergies    Intolerances        ANTIMICROBIALS:  bictegravir 50 mG/emtricitabine 200 mG/tenofovir alafenamide 25 mG (BIKTARVY) 1 daily  piperacillin/tazobactam IVPB.. 3.375 every 8 hours      OTHER MEDS:  acetaminophen     Tablet .. 650 milliGRAM(s) Oral every 6 hours PRN  ALPRAZolam 1 milliGRAM(s) Oral three times a day PRN  aluminum hydroxide/magnesium hydroxide/simethicone Suspension 30 milliLiter(s) Oral every 4 hours PRN  atorvastatin 10 milliGRAM(s) Oral at bedtime  chlorhexidine 2% Cloths 1 Application(s) Topical daily  diltiazem    milliGRAM(s) Oral daily  enoxaparin Injectable 40 milliGRAM(s) SubCutaneous every 24 hours  influenza   Vaccine 0.5 milliLiter(s) IntraMuscular once  melatonin 3 milliGRAM(s) Oral at bedtime PRN  metoprolol succinate  milliGRAM(s) Oral daily  ondansetron Injectable 4 milliGRAM(s) IV Push every 8 hours PRN  oxyCODONE    IR 10 milliGRAM(s) Oral every 4 hours PRN  sodium chloride 0.9%. 500 milliLiter(s) IV Continuous <Continuous>  tamsulosin 0.4 milliGRAM(s) Oral at bedtime      SOCIAL HISTORY:  , lives with wife  no smoking, alcohol or drug abuse  no recent travel    FAMILY HISTORY:  FH: prostate cancer    FH: breast cancer        ROS:    All other systems negative     Constitutional: had fever, now resolved  Eye: no eye pain, no redness, no vision changes  ENT:  no sore throat, no rhinorrhea  Cardiovascular:  no chest pain, no palpitation  Respiratory:  no SOB, no cough  GI:  +abd pain, no vomiting, no diarrhea  urinary: +dysuria  : resolved penile numbness  musculoskeletal:  no joint pain, no joint swelling  skin:  no rash  neurology:  no headache, no seizure, no change in mental status  psych: no anxiety, no depression     Physical Exam:    General:    NAD, non toxic  Head: atraumatic, normocephalic  Eyes: normal sclera and conjunctiva  ENT:   no oropharyngeal lesions, no LAD, neck supple  Cardio:    regular S1,S2  Respiratory:   clear b/l, no wheezing  abd:   soft, BS +, not tender, ostomy  :   slight suprapubic tenderness, no davis  Musculoskeletal : no joint swelling, no edema  Skin:    no rash  vascular: no phlebitis  Neurologic:     no focal deficits  psych: normal affect      Drug Dosing Weight  Height (cm): 180.3 (06 Oct 2023 21:40)  Weight (kg): 96.7 (06 Oct 2023 21:40)  BMI (kg/m2): 29.7 (06 Oct 2023 21:40)  BSA (m2): 2.17 (06 Oct 2023 21:40)    Vital Signs Last 24 Hrs  T(F): 98.5 (10-09-23 @ 04:53), Max: 103 (10-07-23 @ 05:50)    Vital Signs Last 24 Hrs  HR: 84 (10-09-23 @ 04:53) (84 - 90)  BP: 136/94 (10-09-23 @ 04:53) (126/82 - 142/97)  RR: 18 (10-09-23 @ 04:53)  SpO2: 96% (10-09-23 @ 04:53) (96% - 96%)  Wt(kg): --                          10.2   5.37  )-----------( 220      ( 09 Oct 2023 04:26 )             30.5       10-09    140  |  103  |  9   ----------------------------<  114<H>  3.6   |  26  |  0.82    Ca    8.7      09 Oct 2023 04:26  Phos  3.4     10-09  Mg     2.00     10-09        Urinalysis Basic - ( 09 Oct 2023 04:26 )    Color: x / Appearance: x / SG: x / pH: x  Gluc: 114 mg/dL / Ketone: x  / Bili: x / Urobili: x   Blood: x / Protein: x / Nitrite: x   Leuk Esterase: x / RBC: x / WBC x   Sq Epi: x / Non Sq Epi: x / Bacteria: x        MICROBIOLOGY:  v  Clean Catch Clean Catch (Midstream)  10-06-23   >100,000 CFU/ml Proteus mirabilis  --  Proteus mirabilis      .Blood Blood-Peripheral  10-06-23   No growth at 72 Hours  --  --      .Blood Blood-Peripheral  10-06-23   No growth at 72 Hours  --  --      Clean Catch Clean Catch (Midstream)  09-13-23   10,000 - 49,000 CFU/mL Proteus mirabilis  <10,000 CFU/ml Normal Urogenital jonny present  --  Proteus mirabilis      .Blood Blood-Peripheral  09-13-23   No growth at 5 days  --  --      .Blood Blood-Peripheral  09-13-23   No growth at 5 days  --  --        HIV-1 RNA Quantitative, Viral Load Log: NOT DET. lg /mL (08-10-23 @ 14:55)  HIV-1 RNA Quantitative, Viral Load Log: NOT DET. lg /mL (07-23-23 @ 07:36)  HIV-1 RNA Quantitative, Viral Load Log: NOT DET. lg /mL (05-11-23 @ 15:18)  HIV-1 RNA Quantitative, Viral Load Log: NOT DET. lg /mL (02-09-23 @ 11:00)  HIV-1 RNA Quantitative, Viral Load Log: NOT DET. lg /mL (10-20-22 @ 14:47)    Rapid RVP Result: NotDetec (10-06 @ 02:40)          RADIOLOGY:    Images independently visualized and reviewed personally,  findings as below    < from: CT Abdomen and Pelvis w/ IV Cont (10.06.23 @ 02:09) >    IMPRESSION:  Moderate hydronephrosis is again redemonstrated, with bilateral   urothelial thickening and adjacent inflammatory stranding, which may be   due to reactive inflammation or ascending urinary tract infection,   correlate with urinalysis.    < end of copied text >  < from: MR Pelvis/Rectal/Anal w/wo IV Cont (09.22.23 @ 12:40) >  IMPRESSION:    1.5 cm fat rim-enhancing collection at the left posterolateral aspect of   the perineal flap most likely representing a small abscess.    Previously seen collection at adjacent to the coccyx is no longer   present. Mild persistent enhancement and phlegmonous change.    < end of copied text >  < from: MR Lumbar Spine w/wo IV Cont (09.17.23 @ 13:29) >  IMPRESSION:    Grossly unchanged multilevel lumbar spondylosis superimposed on   congenital spinal canal narrowing. Findings are again most prominent at   the level of L4-L5 where there is unchanged marked effacement of the   thecal sac and bilateral neural foraminal narrowing, moderate on the   right and mild on the left. No focal osseous lesion. No abnormal   intramedullary or leptomeningeal enhancement.    < end of copied text >

## 2023-10-09 NOTE — PROGRESS NOTE ADULT - ASSESSMENT
58 y male with hx of ureteral strictures with b/l ureteral stents presenting with likely cystitis / ascending UTI r/o Urosepsis    --BCx NGTD, urine pending  --Cw abx, narrow w/ cx  --PVR 0cc  --No urologic intervention needed at this time. 
57 y/o M with pmh of HTN, HLD, anxiety/depression, HIV on Biktarvy, prostate ca s/p radiation (2016), anal ca s/p radiation c/b rectal perforation s/p abdominal peritoneal resection with end colostomy, radiation cystitis and ureteral strictures s/p ureteral stents presents with dysuria, burning with urination, abdominal pain, penile pain, found to be septic due to UTI with moderate bilateral hydronephrosis. 
59 y/o M with pmh of HTN, HLD, anxiety/depression, HIV on Biktarvy, prostate ca s/p radiation (2016), anal ca s/p radiation c/b rectal perforation s/p abdominal peritoneal resection with end colostomy, radiation cystitis and ureteral strictures s/p ureteral stents presents with dysuria, burning with urination, abdominal pain, penile pain, found to be septic due to UTI with moderate bilateral hydronephrosis. 
59 y/o M with pmh of HTN, HLD, anxiety/depression, HIV on Biktarvy, prostate ca s/p radiation (2016), anal ca s/p radiation c/b rectal perforation s/p abdominal peritoneal resection with end colostomy, radiation cystitis and ureteral strictures s/p ureteral stents presents with dysuria, burning with urination, abdominal pain, penile pain, found to be septic due to UTI with moderate bilateral hydronephrosis.

## 2023-10-09 NOTE — PROGRESS NOTE ADULT - PROBLEM SELECTOR PLAN 2
moderate hydronephrosis, appears chronic  pt has bilateral urethral stents  -Urology consulted- no intervention   -c/w flomax

## 2023-10-10 ENCOUNTER — TRANSCRIPTION ENCOUNTER (OUTPATIENT)
Age: 58
End: 2023-10-10

## 2023-10-10 VITALS
HEART RATE: 90 BPM | SYSTOLIC BLOOD PRESSURE: 137 MMHG | OXYGEN SATURATION: 99 % | DIASTOLIC BLOOD PRESSURE: 88 MMHG | TEMPERATURE: 98 F | RESPIRATION RATE: 18 BRPM

## 2023-10-10 LAB
ANION GAP SERPL CALC-SCNC: 9 MMOL/L — SIGNIFICANT CHANGE UP (ref 7–14)
BUN SERPL-MCNC: 11 MG/DL — SIGNIFICANT CHANGE UP (ref 7–23)
CALCIUM SERPL-MCNC: 9 MG/DL — SIGNIFICANT CHANGE UP (ref 8.4–10.5)
CHLORIDE SERPL-SCNC: 106 MMOL/L — SIGNIFICANT CHANGE UP (ref 98–107)
CO2 SERPL-SCNC: 26 MMOL/L — SIGNIFICANT CHANGE UP (ref 22–31)
CREAT SERPL-MCNC: 0.83 MG/DL — SIGNIFICANT CHANGE UP (ref 0.5–1.3)
EGFR: 102 ML/MIN/1.73M2 — SIGNIFICANT CHANGE UP
GLUCOSE SERPL-MCNC: 93 MG/DL — SIGNIFICANT CHANGE UP (ref 70–99)
HCT VFR BLD CALC: 32.3 % — LOW (ref 39–50)
HGB BLD-MCNC: 10.7 G/DL — LOW (ref 13–17)
MAGNESIUM SERPL-MCNC: 2.2 MG/DL — SIGNIFICANT CHANGE UP (ref 1.6–2.6)
MCHC RBC-ENTMCNC: 29.2 PG — SIGNIFICANT CHANGE UP (ref 27–34)
MCHC RBC-ENTMCNC: 33.1 GM/DL — SIGNIFICANT CHANGE UP (ref 32–36)
MCV RBC AUTO: 88.3 FL — SIGNIFICANT CHANGE UP (ref 80–100)
NRBC # BLD: 0 /100 WBCS — SIGNIFICANT CHANGE UP (ref 0–0)
NRBC # FLD: 0 K/UL — SIGNIFICANT CHANGE UP (ref 0–0)
PHOSPHATE SERPL-MCNC: 3.3 MG/DL — SIGNIFICANT CHANGE UP (ref 2.5–4.5)
PLATELET # BLD AUTO: 291 K/UL — SIGNIFICANT CHANGE UP (ref 150–400)
POTASSIUM SERPL-MCNC: 3.4 MMOL/L — LOW (ref 3.5–5.3)
POTASSIUM SERPL-SCNC: 3.4 MMOL/L — LOW (ref 3.5–5.3)
RBC # BLD: 3.66 M/UL — LOW (ref 4.2–5.8)
RBC # FLD: 13.2 % — SIGNIFICANT CHANGE UP (ref 10.3–14.5)
SODIUM SERPL-SCNC: 141 MMOL/L — SIGNIFICANT CHANGE UP (ref 135–145)
WBC # BLD: 5.08 K/UL — SIGNIFICANT CHANGE UP (ref 3.8–10.5)
WBC # FLD AUTO: 5.08 K/UL — SIGNIFICANT CHANGE UP (ref 3.8–10.5)

## 2023-10-10 PROCEDURE — 99239 HOSP IP/OBS DSCHRG MGMT >30: CPT

## 2023-10-10 RX ORDER — LANOLIN ALCOHOL/MO/W.PET/CERES
1 CREAM (GRAM) TOPICAL
Qty: 0 | Refills: 0 | DISCHARGE
Start: 2023-10-10

## 2023-10-10 RX ORDER — LACTOBACILLUS ACIDOPHILUS 100MM CELL
2 CAPSULE ORAL
Qty: 60 | Refills: 0
Start: 2023-10-10 | End: 2023-11-08

## 2023-10-10 RX ORDER — POTASSIUM CHLORIDE 20 MEQ
40 PACKET (EA) ORAL ONCE
Refills: 0 | Status: COMPLETED | OUTPATIENT
Start: 2023-10-10 | End: 2023-10-10

## 2023-10-10 RX ORDER — ACETAMINOPHEN 500 MG
2 TABLET ORAL
Qty: 0 | Refills: 0 | DISCHARGE
Start: 2023-10-10

## 2023-10-10 RX ADMIN — Medication 40 MILLIEQUIVALENT(S): at 10:15

## 2023-10-10 RX ADMIN — PIPERACILLIN AND TAZOBACTAM 25 GRAM(S): 4; .5 INJECTION, POWDER, LYOPHILIZED, FOR SOLUTION INTRAVENOUS at 06:44

## 2023-10-10 RX ADMIN — OXYCODONE HYDROCHLORIDE 10 MILLIGRAM(S): 5 TABLET ORAL at 10:15

## 2023-10-10 RX ADMIN — CHLORHEXIDINE GLUCONATE 1 APPLICATION(S): 213 SOLUTION TOPICAL at 13:09

## 2023-10-10 RX ADMIN — Medication 1 TABLET(S): at 13:23

## 2023-10-10 RX ADMIN — OXYCODONE HYDROCHLORIDE 10 MILLIGRAM(S): 5 TABLET ORAL at 11:05

## 2023-10-10 RX ADMIN — Medication 100 MILLIGRAM(S): at 06:44

## 2023-10-10 RX ADMIN — BICTEGRAVIR SODIUM, EMTRICITABINE, AND TENOFOVIR ALAFENAMIDE FUMARATE 1 TABLET(S): 30; 120; 15 TABLET ORAL at 13:08

## 2023-10-10 RX ADMIN — Medication 240 MILLIGRAM(S): at 06:45

## 2023-10-10 NOTE — DISCHARGE NOTE PROVIDER - CARE PROVIDER_API CALL
Jarad Schwartz (DO)  Family Medicine  [address] [place] [address]  Phone: [phone]  Fax: [phone]  Follow Up Time:

## 2023-10-10 NOTE — DISCHARGE NOTE PROVIDER - HOSPITAL COURSE
58M PMH of HTN, HLD, anxiety/depression, HIV on Biktarvy, prostate cancer s/p radiation (2016), anal cancer s/p radiation c/b rectal perforation s/p abdominal peritoneal resection with end colostomy, radiation cystitis and ureteral strictures s/p ureteral stents presents with dysuria, burning with urination, abdominal pain, penile pain, found to be septic due to UTI with moderate bilateral hydronephrosis.    ID consulted, s/p Zosyn while admitted, will continue with Augmentin on discharge to complete a 14 day course.  Urology consulted, no acute intervention; outpatient urology follow up.    Case discussed with Dr. Collazo on 10/10. Patient is medically stable and optimized for discharge home as per attending. All medications were reviewed and prescriptions were sent to a mutually agreed upon pharmacy. Discharge plan reviewed with patient.

## 2023-10-10 NOTE — DISCHARGE NOTE PROVIDER - NSDCFUADDAPPT_GEN_ALL_CORE_FT
Follow up with your primary care provider in 1-2 weeks of discharge.    Follow up with urology and infectious disease as scheduled on discharge.

## 2023-10-10 NOTE — DISCHARGE NOTE PROVIDER - NSDCFUSCHEDAPPT_GEN_ALL_CORE_FT
Brunner, Robert J [facility]  LVS PreAdmits  Scheduled Appointment: 10/13/2023    [facility]  ONCPAINMGT [address]   Scheduled Appointment: 10/17/2023    Brunner, Robert J [facility]  LVS PreAdmits  Scheduled Appointment: 10/27/2023    Brunner, Robert J  [facility]  GASTRO SNOW [address]  Scheduled Appointment: 10/27/2023    Ravi Mathew  [facility]  UROLOGY [address]  Scheduled Appointment: 11/17/2023    Denise Gordon  [facility]  INFDISEASE [address]  Scheduled Appointment: 11/20/2023    Kenia Turner  [facility]  NEUROLOGY [address] [facility]  Scheduled Appointment: 11/30/2023

## 2023-10-10 NOTE — DISCHARGE NOTE PROVIDER - NSDCCPCAREPLAN_GEN_ALL_CORE_FT
PRINCIPAL DISCHARGE DIAGNOSIS  Diagnosis: Pyelonephritis  Assessment and Plan of Treatment: You came to the hospital due to abdominal pain, burning with urination. You were found to have a urinary tract infection and pyelonephritis (UTI that has traveled to the kidneys). You were seen by the urology team and infectious disease team. Continue with antibiotics as prescribed on discharge. Follow up with your primary care provider, urology, and infectious disease on discharge.

## 2023-10-10 NOTE — DISCHARGE NOTE NURSING/CASE MANAGEMENT/SOCIAL WORK - NSDCPEFALRISK_GEN_ALL_CORE
For information on Fall & Injury Prevention, visit: [email or web address]/[email or web address]-prevention-protects-and-maintains-health-and-mobility OR  [email or web address]://www.[facility]/news/fall-prevention-tips-to-avoid-injury OR  https://www.cdc.gov/steadi/patient.html

## 2023-10-10 NOTE — DISCHARGE NOTE PROVIDER - NSDCMRMEDTOKEN_GEN_ALL_CORE_FT
acetaminophen 325 mg oral tablet: 2 tab(s) orally every 6 hours As needed Temp greater or equal to 38C (100.4F), Mild Pain (1 - 3)  ALPRAZolam 1 mg oral tablet: 1 tab(s) orally 3 times a day, As Needed  amoxicillin-clavulanate 875 mg-125 mg oral tablet: 875 milligram(s) orally 2 times a day  atorvastatin 10 mg oral tablet: 1 tab(s) orally once a day  bictegravir/emtricitabine/tenofovir 50 mg-200 mg-25 mg oral tablet: 1 tab(s) orally once a day  DilTIAZem (Eqv-Cardizem CD) 240 mg/24 hours oral capsule, extended release: 1 cap(s) orally once a day  ezetimibe 10 mg oral tablet: 1 tab(s) orally once a day  lactobacillus acidophilus oral tablet: 2 tab(s) orally once a day  melatonin 3 mg oral tablet: 1 tab(s) orally once a day (at bedtime) As needed Insomnia  metoprolol succinate 100 mg oral tablet, extended release: 1 tab(s) orally once a day  tamsulosin 0.4 mg oral capsule: 1 cap(s) orally once a day (at bedtime)

## 2023-10-10 NOTE — DISCHARGE NOTE PROVIDER - NSDCQMAMI_CARD_ALL_CORE
4/3/2023 2:16 PM    Ms. Dara Wall  [address] [place] [address]    To Whom It May Concern,    Dara Wall is under the care of [facility]. She has a history of lymphedema of right lower extremity. She has difficulty ambulating for long distances and would benefit from a mobility scooter. She is not able to use canes and walkers/rollators due to decreased endurance and mobility limitations due to diagnosis. If you have any questions, please contact the office at [phone].     Sincerely,        Jocy Mathew NP
No

## 2023-10-13 ENCOUNTER — OUTPATIENT (OUTPATIENT)
Dept: OUTPATIENT SERVICES | Facility: HOSPITAL | Age: 58
LOS: 1 days | Discharge: HOME | End: 2023-10-13

## 2023-10-13 VITALS
TEMPERATURE: 98 F | HEIGHT: 71 IN | WEIGHT: 216.27 LBS | OXYGEN SATURATION: 97 % | DIASTOLIC BLOOD PRESSURE: 93 MMHG | RESPIRATION RATE: 17 BRPM | SYSTOLIC BLOOD PRESSURE: 118 MMHG | HEART RATE: 95 BPM

## 2023-10-13 DIAGNOSIS — K62.9 DISEASE OF ANUS AND RECTUM, UNSPECIFIED: Chronic | ICD-10-CM

## 2023-10-13 DIAGNOSIS — Z01.818 ENCOUNTER FOR OTHER PREPROCEDURAL EXAMINATION: ICD-10-CM

## 2023-10-13 DIAGNOSIS — I10 ESSENTIAL (PRIMARY) HYPERTENSION: ICD-10-CM

## 2023-10-13 DIAGNOSIS — R10.9 UNSPECIFIED ABDOMINAL PAIN: ICD-10-CM

## 2023-10-13 DIAGNOSIS — Z93.3 COLOSTOMY STATUS: Chronic | ICD-10-CM

## 2023-10-13 DIAGNOSIS — F41.9 ANXIETY DISORDER, UNSPECIFIED: ICD-10-CM

## 2023-10-13 DIAGNOSIS — B20 HUMAN IMMUNODEFICIENCY VIRUS [HIV] DISEASE: ICD-10-CM

## 2023-10-13 DIAGNOSIS — E78.5 HYPERLIPIDEMIA, UNSPECIFIED: ICD-10-CM

## 2023-10-13 DIAGNOSIS — Z01.812 ENCOUNTER FOR PREPROCEDURAL LABORATORY EXAMINATION: ICD-10-CM

## 2023-10-13 LAB
ALBUMIN SERPL ELPH-MCNC: 3.1 G/DL — LOW (ref 3.3–5)
ALP SERPL-CCNC: 97 U/L — SIGNIFICANT CHANGE UP (ref 40–120)
ALT FLD-CCNC: 34 U/L — SIGNIFICANT CHANGE UP (ref 12–78)
ANION GAP SERPL CALC-SCNC: 7 MMOL/L — SIGNIFICANT CHANGE UP (ref 5–17)
AST SERPL-CCNC: 19 U/L — SIGNIFICANT CHANGE UP (ref 15–37)
BASOPHILS # BLD AUTO: 0.05 K/UL — SIGNIFICANT CHANGE UP (ref 0–0.2)
BASOPHILS NFR BLD AUTO: 0.7 % — SIGNIFICANT CHANGE UP (ref 0–2)
BILIRUB SERPL-MCNC: <0.1 MG/DL — LOW (ref 0.2–1.2)
BUN SERPL-MCNC: 19 MG/DL — SIGNIFICANT CHANGE UP (ref 7–23)
CALCIUM SERPL-MCNC: 8.6 MG/DL — SIGNIFICANT CHANGE UP (ref 8.5–10.1)
CHLORIDE SERPL-SCNC: 109 MMOL/L — HIGH (ref 96–108)
CO2 SERPL-SCNC: 28 MMOL/L — SIGNIFICANT CHANGE UP (ref 22–31)
CREAT SERPL-MCNC: 0.94 MG/DL — SIGNIFICANT CHANGE UP (ref 0.5–1.3)
EGFR: 95 ML/MIN/1.73M2 — SIGNIFICANT CHANGE UP
EOSINOPHIL # BLD AUTO: 0.09 K/UL — SIGNIFICANT CHANGE UP (ref 0–0.5)
EOSINOPHIL NFR BLD AUTO: 1.3 % — SIGNIFICANT CHANGE UP (ref 0–6)
GLUCOSE SERPL-MCNC: 88 MG/DL — SIGNIFICANT CHANGE UP (ref 70–99)
HCT VFR BLD CALC: 37.1 % — LOW (ref 39–50)
HGB BLD-MCNC: 11.6 G/DL — LOW (ref 13–17)
IMM GRANULOCYTES NFR BLD AUTO: 1.7 % — HIGH (ref 0–0.9)
LYMPHOCYTES # BLD AUTO: 2.27 K/UL — SIGNIFICANT CHANGE UP (ref 1–3.3)
LYMPHOCYTES # BLD AUTO: 31.8 % — SIGNIFICANT CHANGE UP (ref 13–44)
MCHC RBC-ENTMCNC: 28.8 PG — SIGNIFICANT CHANGE UP (ref 27–34)
MCHC RBC-ENTMCNC: 31.3 G/DL — LOW (ref 32–36)
MCV RBC AUTO: 92.1 FL — SIGNIFICANT CHANGE UP (ref 80–100)
MONOCYTES # BLD AUTO: 0.67 K/UL — SIGNIFICANT CHANGE UP (ref 0–0.9)
MONOCYTES NFR BLD AUTO: 9.4 % — SIGNIFICANT CHANGE UP (ref 2–14)
NEUTROPHILS # BLD AUTO: 3.93 K/UL — SIGNIFICANT CHANGE UP (ref 1.8–7.4)
NEUTROPHILS NFR BLD AUTO: 55.1 % — SIGNIFICANT CHANGE UP (ref 43–77)
NRBC # BLD: 0 /100 WBCS — SIGNIFICANT CHANGE UP (ref 0–0)
PLATELET # BLD AUTO: 574 K/UL — HIGH (ref 150–400)
POTASSIUM SERPL-MCNC: 4.2 MMOL/L — SIGNIFICANT CHANGE UP (ref 3.5–5.3)
POTASSIUM SERPL-SCNC: 4.2 MMOL/L — SIGNIFICANT CHANGE UP (ref 3.5–5.3)
PROT SERPL-MCNC: 7.4 GM/DL — SIGNIFICANT CHANGE UP (ref 6–8.3)
RBC # BLD: 4.03 M/UL — LOW (ref 4.2–5.8)
RBC # FLD: 14 % — SIGNIFICANT CHANGE UP (ref 10.3–14.5)
SODIUM SERPL-SCNC: 144 MMOL/L — SIGNIFICANT CHANGE UP (ref 135–145)
WBC # BLD: 7.13 K/UL — SIGNIFICANT CHANGE UP (ref 3.8–10.5)
WBC # FLD AUTO: 7.13 K/UL — SIGNIFICANT CHANGE UP (ref 3.8–10.5)

## 2023-10-13 NOTE — H&P PST ADULT - MUSCULOSKELETAL
negative no calf tenderness ROM intact/no calf tenderness/normal gait/strength 5/5 bilateral upper extremities/strength 5/5 bilateral lower extremities

## 2023-10-13 NOTE — H&P PST ADULT - PATIENT'S PREFERRED PRONOUN
Return to the ER if you develop:    Fever/chills, change in activity, change in appetite, reduction in wet diapers, rash, green/yellow discharge from the eye.
Him/He

## 2023-10-13 NOTE — H&P PST ADULT - NEGATIVE GASTROINTESTINAL SYMPTOMS
no nausea/no vomiting/no diarrhea/no constipation/no melena/no hematochezia/no steatorrhea/no jaundice/no hiccoughs

## 2023-10-13 NOTE — H&P PST ADULT - NSICDXPASTMEDICALHX_GEN_ALL_CORE_FT
PAST MEDICAL HISTORY:  Anal cancer     Anxiety     Depressive disorder     Diverticulosis     HIV infection     HLD (hyperlipidemia)     HTN (hypertension)     Lung cancer     Prostate cancer      PAST MEDICAL HISTORY:  Anal cancer     Anxiety     Depressive disorder     Diverticulosis     HIV infection     HLD (hyperlipidemia)     HTN (hypertension)     Lung cancer     Prostate cancer     Unspecified abdominal pain

## 2023-10-13 NOTE — H&P PST ADULT - PROBLEM SELECTOR PLAN 1
Pre-op instructions given. Pt verbalized understanding  Pt instructed to hold all NSAIDs + herbal supplements/vitamins 7 days before surgery  Pending: lab result  Pending: M/C for abnormal ecg + copy of echo/stress results

## 2023-10-13 NOTE — H&P PST ADULT - HISTORY OF PRESENT ILLNESS
57M pmhx HIV (on Biktarvy), htn, prostate CA (s/p RT), anal CA (s/p RT/chemo), anxiety with calculus of kidney here for PST for scheduled Ureteroscopy and laser lithotripsy   This patient denies any fever, cough, sob, flu like symptoms or travel outside of the [place] in the past 30 days    59y/o with medical h/o HIV (on Biktarvy), htn, prostate CA (s/p RT), anal CA (s/p RT/chemo), anxiety with calculus of kidney, reports Abdominal pain, presents today for PST for scheduled Gastroscopy on 10/27/2023

## 2023-10-17 ENCOUNTER — RX RENEWAL (OUTPATIENT)
Age: 58
End: 2023-10-17

## 2023-10-17 ENCOUNTER — APPOINTMENT (OUTPATIENT)
Dept: INTERNAL MEDICINE | Facility: CLINIC | Age: 58
End: 2023-10-17
Payer: MEDICAID

## 2023-10-17 ENCOUNTER — APPOINTMENT (OUTPATIENT)
Dept: PAIN MANAGEMENT | Facility: CLINIC | Age: 58
End: 2023-10-17
Payer: MEDICAID

## 2023-10-17 VITALS
HEART RATE: 92 BPM | SYSTOLIC BLOOD PRESSURE: 127 MMHG | BODY MASS INDEX: 29.01 KG/M2 | TEMPERATURE: 97.3 F | DIASTOLIC BLOOD PRESSURE: 85 MMHG | WEIGHT: 208 LBS | OXYGEN SATURATION: 98 %

## 2023-10-17 DIAGNOSIS — K62.89 OTHER SPECIFIED DISEASES OF ANUS AND RECTUM: ICD-10-CM

## 2023-10-17 PROCEDURE — 99213 OFFICE O/P EST LOW 20 MIN: CPT | Mod: 95

## 2023-10-17 PROCEDURE — 99214 OFFICE O/P EST MOD 30 MIN: CPT

## 2023-10-17 RX ORDER — PANTOPRAZOLE 40 MG/1
40 TABLET, DELAYED RELEASE ORAL DAILY
Qty: 30 | Refills: 3 | Status: ACTIVE | COMMUNITY
Start: 2023-07-12 | End: 1900-01-01

## 2023-10-23 ENCOUNTER — TRANSCRIPTION ENCOUNTER (OUTPATIENT)
Age: 58
End: 2023-10-23

## 2023-10-23 ENCOUNTER — RX RENEWAL (OUTPATIENT)
Age: 58
End: 2023-10-23

## 2023-10-24 ENCOUNTER — RX RENEWAL (OUTPATIENT)
Age: 58
End: 2023-10-24

## 2023-10-24 RX ORDER — FAMOTIDINE 40 MG/1
40 TABLET, FILM COATED ORAL TWICE DAILY
Qty: 30 | Refills: 3 | Status: ACTIVE | COMMUNITY
Start: 2023-08-31 | End: 1900-01-01

## 2023-10-26 ENCOUNTER — APPOINTMENT (OUTPATIENT)
Dept: GERIATRICS | Facility: CLINIC | Age: 58
End: 2023-10-26
Payer: MEDICAID

## 2023-10-26 PROCEDURE — 99213 OFFICE O/P EST LOW 20 MIN: CPT | Mod: 95

## 2023-10-26 NOTE — CONSULT NOTE ADULT - ASSESSMENT
Subjective     Patient ID: Dilshad is a 5 year old female.    Referring Provider: Tara Medina*  PCP: Tara Carney MD    Dilshad is accompanied by Mother    If person accompanying child is not their legal guardian, then is their name listed on Authorization for Treatment of Minor by Delegated Persons? NA    Chief Complaint   Patient presents with   • Right Elbow - Follow-up   • Office Visit         Visit Type: FRACTURE CARE    Chief Complaint:  Right elbow occult fracture    Date of Injury: September 30, 2023    History:  Patient is a 5-year-old female who sustained a right elbow fracture when she fell onto elbow while playing with sister. Patient was treated in long arm, soft roll fiberglass cast at the last visit. Patient tolerated cast well. Patient has no complaints of pain and has not required any pain medication. Patient has no reports of numbness or tingling. Patient has no other complaints.     ROS: pt notes no new musculoskeletal, neurologic or vascular conditions    Exam:  pt is well developed and has normal affect    Right ELBOW: Normal appearance. No tenderness. Limited AROM. Decreased power in all movements. No effusion. No laxity. No crepitation. (+) pain with weightbearing and pushing. No tenderness or Tinel sign at ulnar nerve.  Skin, sensation, and circulation  are normal.                                                         Impression/ diagnosis:  Healing right elbow occult fracture    Discussion / plan:  The clinical findings were discussed with the patient's parent. Explained to father that by now, the tiny fracture would be fully healed. It is safe for patient to continue without a cast. Patient may use a sling to injured upper extremity for comfort. May take sling off at home and work on gentle range of motion. May also take sling off to shower and sleep. Advised father to let patient return to her activities as tolerated over the next few weeks. Patient may  favor arm for a few days but it is okay for her to increase back to normal.  Educated parent that patient may become sore over next 1-2 weeks while increasing activities back to normal. May take tylenol or motrin as needed. If patient has trouble returning to full, normal activity, advised parent to return for re-evaluation. Otherwise, follow up as needed.     This patient was seen in conjunction with COLE Armstrong, for an established plan of care. I personallly obtained history, examined the patient, and reviewed relevant imaging and determined the assessment and plan.  I personally performed all the medical decision making and communicated with the patient and family. I determined the diagnosis and the treatment plan for resumption of activities, fu prn   58 M PMH of HIV on Biktarvy and s/p diverting loop sigmoid colostomy (Dr. Avilez 03/22), abdominal perineal resection w/ pelvic drainage, colostomy takedown, resection, colostomy creation for rectal cancer with Dr. Betancourt with plastic surgery myocutaneous flap creation in August 2022. Pt presents with a perineal abscess since Sunday. Some blood and pus has drained from abscess. CT A/P showing new small collection measuring 2.4 cm in the inferior aspect of the intergluteal cleft immediately inferior to the coccyx, which may represent abscess.      PLAN:  - Consult plastic surgery for abscess drainage, if not colorectal will drain  - Above discussed with Dr. Carnes, on behalf of Dr. Townsend 58 M PMH of HIV on Biktarvy and s/p diverting loop sigmoid colostomy (Dr. Avilez 03/22), abdominal perineal resection w/ pelvic drainage, colostomy takedown, resection, colostomy creation for rectal cancer with Dr. Betancourt with plastic surgery myocutaneous flap creation in August 2022. Pt presents with a perineal abscess since Sunday. Some blood and pus has drained from abscess. CT A/P showing new small collection measuring 2.4 cm in the inferior aspect of the intergluteal cleft immediately inferior to the coccyx, which may represent abscess.      PLAN:  - Consult plastic surgery for abscess drainage, if not colorectal will drain  - Above discussed with Dr. Carnes, on behalf of Dr. Townsend    _____  Addendum:  Abscess drained at bedside, refer to procedure note for details.  Stable for discharge home with 5 days of cipro  Outpatient follow up with Dr. Betancourt    D/w colorectal fellow    STELLA Washington, PGY-4  A Team Surgery   [id number]

## 2023-10-27 ENCOUNTER — OUTPATIENT (OUTPATIENT)
Dept: OUTPATIENT SERVICES | Facility: HOSPITAL | Age: 58
LOS: 1 days | Discharge: HOME | End: 2023-10-27
Payer: MEDICAID

## 2023-10-27 ENCOUNTER — TRANSCRIPTION ENCOUNTER (OUTPATIENT)
Age: 58
End: 2023-10-27

## 2023-10-27 ENCOUNTER — RESULT REVIEW (OUTPATIENT)
Age: 58
End: 2023-10-27

## 2023-10-27 ENCOUNTER — APPOINTMENT (OUTPATIENT)
Dept: GASTROENTEROLOGY | Facility: HOSPITAL | Age: 58
End: 2023-10-27
Payer: MEDICAID

## 2023-10-27 VITALS
HEIGHT: 71 IN | HEART RATE: 84 BPM | SYSTOLIC BLOOD PRESSURE: 118 MMHG | TEMPERATURE: 98 F | DIASTOLIC BLOOD PRESSURE: 85 MMHG | RESPIRATION RATE: 16 BRPM | WEIGHT: 216.27 LBS | OXYGEN SATURATION: 97 %

## 2023-10-27 VITALS — RESPIRATION RATE: 14 BRPM | OXYGEN SATURATION: 100 % | HEART RATE: 74 BPM

## 2023-10-27 DIAGNOSIS — K62.9 DISEASE OF ANUS AND RECTUM, UNSPECIFIED: Chronic | ICD-10-CM

## 2023-10-27 DIAGNOSIS — Z93.3 COLOSTOMY STATUS: Chronic | ICD-10-CM

## 2023-10-27 PROCEDURE — 43239 EGD BIOPSY SINGLE/MULTIPLE: CPT

## 2023-10-27 PROCEDURE — 88312 SPECIAL STAINS GROUP 1: CPT | Mod: 26

## 2023-10-27 PROCEDURE — 88305 TISSUE EXAM BY PATHOLOGIST: CPT | Mod: 26

## 2023-10-27 RX ORDER — SODIUM CHLORIDE 9 MG/ML
1000 INJECTION, SOLUTION INTRAVENOUS
Refills: 0 | Status: DISCONTINUED | OUTPATIENT
Start: 2023-10-27 | End: 2023-10-30

## 2023-10-27 RX ORDER — ONDANSETRON 8 MG/1
4 TABLET, FILM COATED ORAL ONCE
Refills: 0 | Status: DISCONTINUED | OUTPATIENT
Start: 2023-10-27 | End: 2023-10-30

## 2023-10-27 RX ADMIN — SODIUM CHLORIDE 100 MILLILITER(S): 9 INJECTION, SOLUTION INTRAVENOUS at 10:10

## 2023-10-27 NOTE — ASU PATIENT PROFILE, ADULT - NSICDXPASTMEDICALHX_GEN_ALL_CORE_FT
PAST MEDICAL HISTORY:  Anal cancer     Anxiety     Depressive disorder     Diverticulosis     HIV infection     HLD (hyperlipidemia)     HTN (hypertension)     Lung cancer     Prostate cancer     Unspecified abdominal pain

## 2023-10-30 LAB
SURGICAL PATHOLOGY STUDY: SIGNIFICANT CHANGE UP
SURGICAL PATHOLOGY STUDY: SIGNIFICANT CHANGE UP

## 2023-11-02 ENCOUNTER — APPOINTMENT (OUTPATIENT)
Dept: INTERNAL MEDICINE | Facility: CLINIC | Age: 58
End: 2023-11-02
Payer: MEDICAID

## 2023-11-02 ENCOUNTER — LABORATORY RESULT (OUTPATIENT)
Age: 58
End: 2023-11-02

## 2023-11-02 VITALS
DIASTOLIC BLOOD PRESSURE: 92 MMHG | SYSTOLIC BLOOD PRESSURE: 134 MMHG | TEMPERATURE: 97.3 F | WEIGHT: 218 LBS | BODY MASS INDEX: 30.52 KG/M2 | OXYGEN SATURATION: 98 % | HEART RATE: 100 BPM | HEIGHT: 71 IN

## 2023-11-02 DIAGNOSIS — R60.0 LOCALIZED EDEMA: ICD-10-CM

## 2023-11-02 DIAGNOSIS — K21.9 GASTRO-ESOPHAGEAL REFLUX DISEASE WITHOUT ESOPHAGITIS: ICD-10-CM

## 2023-11-02 DIAGNOSIS — K31.89 OTHER DISEASES OF STOMACH AND DUODENUM: ICD-10-CM

## 2023-11-02 DIAGNOSIS — R21 RASH AND OTHER NONSPECIFIC SKIN ERUPTION: ICD-10-CM

## 2023-11-02 DIAGNOSIS — K44.9 DIAPHRAGMATIC HERNIA WITHOUT OBSTRUCTION OR GANGRENE: ICD-10-CM

## 2023-11-02 DIAGNOSIS — I10 ESSENTIAL (PRIMARY) HYPERTENSION: ICD-10-CM

## 2023-11-02 PROCEDURE — 99214 OFFICE O/P EST MOD 30 MIN: CPT | Mod: 25

## 2023-11-02 RX ORDER — PREGABALIN 75 MG/1
75 CAPSULE ORAL
Refills: 0 | Status: DISCONTINUED | COMMUNITY
End: 2023-11-02

## 2023-11-02 RX ORDER — PREGABALIN 75 MG/1
75 CAPSULE ORAL TWICE DAILY
Qty: 60 | Refills: 2 | Status: DISCONTINUED | COMMUNITY
Start: 2023-08-15 | End: 2023-11-02

## 2023-11-02 RX ORDER — GABAPENTIN 300 MG/1
300 CAPSULE ORAL
Qty: 150 | Refills: 0 | Status: DISCONTINUED | COMMUNITY
Start: 2022-02-10 | End: 2023-11-02

## 2023-11-02 RX ORDER — PREGABALIN 150 MG/1
150 CAPSULE ORAL TWICE DAILY
Qty: 60 | Refills: 0 | Status: DISCONTINUED | COMMUNITY
Start: 2023-09-27 | End: 2023-11-02

## 2023-11-06 ENCOUNTER — APPOINTMENT (OUTPATIENT)
Dept: GERIATRICS | Facility: CLINIC | Age: 58
End: 2023-11-06
Payer: MEDICAID

## 2023-11-06 VITALS
HEART RATE: 127 BPM | RESPIRATION RATE: 16 BRPM | DIASTOLIC BLOOD PRESSURE: 98 MMHG | WEIGHT: 216.71 LBS | BODY MASS INDEX: 30.34 KG/M2 | TEMPERATURE: 98 F | OXYGEN SATURATION: 97 % | HEIGHT: 71 IN | SYSTOLIC BLOOD PRESSURE: 146 MMHG

## 2023-11-06 VITALS — DIASTOLIC BLOOD PRESSURE: 106 MMHG | SYSTOLIC BLOOD PRESSURE: 148 MMHG

## 2023-11-06 DIAGNOSIS — N50.82 SCROTAL PAIN: ICD-10-CM

## 2023-11-06 LAB
ALBUMIN SERPL ELPH-MCNC: 4.3 G/DL
ALP BLD-CCNC: 108 U/L
ALT SERPL-CCNC: 11 U/L
ANION GAP SERPL CALC-SCNC: 10 MMOL/L
APPEARANCE: ABNORMAL
AST SERPL-CCNC: 12 U/L
BASOPHILS # BLD AUTO: 0.03 K/UL
BASOPHILS NFR BLD AUTO: 0.4 %
BILIRUB SERPL-MCNC: 0.2 MG/DL
BILIRUBIN URINE: NEGATIVE
BLOOD URINE: ABNORMAL
BUN SERPL-MCNC: 14 MG/DL
CALCIUM SERPL-MCNC: 9.6 MG/DL
CHLORIDE SERPL-SCNC: 103 MMOL/L
CO2 SERPL-SCNC: 29 MMOL/L
COLOR: ABNORMAL
CREAT SERPL-MCNC: 0.9 MG/DL
EGFR: 100 ML/MIN/1.73M2
EOSINOPHIL # BLD AUTO: 0.04 K/UL
EOSINOPHIL NFR BLD AUTO: 0.6 %
GLUCOSE QUALITATIVE U: NEGATIVE MG/DL
GLUCOSE SERPL-MCNC: 133 MG/DL
HCT VFR BLD CALC: 40.1 %
HGB BLD-MCNC: 12.8 G/DL
IMM GRANULOCYTES NFR BLD AUTO: 0.3 %
KETONES URINE: NEGATIVE MG/DL
LEUKOCYTE ESTERASE URINE: ABNORMAL
LYMPHOCYTES # BLD AUTO: 2.45 K/UL
LYMPHOCYTES NFR BLD AUTO: 36 %
MAN DIFF?: NORMAL
MCHC RBC-ENTMCNC: 29.2 PG
MCHC RBC-ENTMCNC: 31.9 GM/DL
MCV RBC AUTO: 91.3 FL
MONOCYTES # BLD AUTO: 0.73 K/UL
MONOCYTES NFR BLD AUTO: 10.7 %
NEUTROPHILS # BLD AUTO: 3.53 K/UL
NEUTROPHILS NFR BLD AUTO: 52 %
NITRITE URINE: NEGATIVE
NT-PROBNP SERPL-MCNC: 61 PG/ML
PH URINE: 6
PLATELET # BLD AUTO: 233 K/UL
POTASSIUM SERPL-SCNC: 4.6 MMOL/L
PROT SERPL-MCNC: 7 G/DL
PROTEIN URINE: 100 MG/DL
RBC # BLD: 4.39 M/UL
RBC # FLD: 14.3 %
SODIUM SERPL-SCNC: 142 MMOL/L
SPECIFIC GRAVITY URINE: 1.01
UROBILINOGEN URINE: 0.2 MG/DL
WBC # FLD AUTO: 6.8 K/UL

## 2023-11-06 PROCEDURE — 99214 OFFICE O/P EST MOD 30 MIN: CPT

## 2023-11-06 RX ORDER — NALOXONE HYDROCHLORIDE 4 MG/.1ML
4 SPRAY NASAL
Qty: 1 | Refills: 0 | Status: ACTIVE | COMMUNITY
Start: 2023-11-06 | End: 1900-01-01

## 2023-11-08 ENCOUNTER — APPOINTMENT (OUTPATIENT)
Dept: CARDIOLOGY | Facility: CLINIC | Age: 58
End: 2023-11-08

## 2023-11-08 ENCOUNTER — APPOINTMENT (OUTPATIENT)
Dept: CARDIOLOGY | Facility: CLINIC | Age: 58
End: 2023-11-08
Payer: MEDICAID

## 2023-11-08 VITALS
OXYGEN SATURATION: 96 % | BODY MASS INDEX: 30.24 KG/M2 | TEMPERATURE: 97 F | HEIGHT: 71 IN | DIASTOLIC BLOOD PRESSURE: 73 MMHG | HEART RATE: 96 BPM | WEIGHT: 216 LBS | SYSTOLIC BLOOD PRESSURE: 110 MMHG

## 2023-11-08 PROCEDURE — 93000 ELECTROCARDIOGRAM COMPLETE: CPT

## 2023-11-08 PROCEDURE — 99215 OFFICE O/P EST HI 40 MIN: CPT | Mod: 25

## 2023-11-09 ENCOUNTER — RX RENEWAL (OUTPATIENT)
Age: 58
End: 2023-11-09

## 2023-11-09 NOTE — ED ADULT NURSE NOTE - NSSEPSISNEWALTERMENTAL_ED_A_ED
Writer met with mother briefly on discharge to ensure there are no remaining questions and wish her well. Writer also provided discharge feedback to pt and wished her well.    Writer provided handoff to [facility] Program staff via email.    Nilayr sent email to Dr. Cates at [facility] with CSe letter signed by mother and discharge summary.
No

## 2023-11-13 ENCOUNTER — APPOINTMENT (OUTPATIENT)
Dept: INFECTIOUS DISEASE | Facility: CLINIC | Age: 58
End: 2023-11-13

## 2023-11-15 ENCOUNTER — APPOINTMENT (OUTPATIENT)
Dept: ULTRASOUND IMAGING | Facility: CLINIC | Age: 58
End: 2023-11-15
Payer: MEDICAID

## 2023-11-15 ENCOUNTER — OUTPATIENT (OUTPATIENT)
Dept: OUTPATIENT SERVICES | Facility: HOSPITAL | Age: 58
LOS: 1 days | End: 2023-11-15
Payer: MEDICAID

## 2023-11-15 DIAGNOSIS — N48.89 OTHER SPECIFIED DISORDERS OF PENIS: ICD-10-CM

## 2023-11-15 DIAGNOSIS — N50.82 SCROTAL PAIN: ICD-10-CM

## 2023-11-15 DIAGNOSIS — Z93.3 COLOSTOMY STATUS: Chronic | ICD-10-CM

## 2023-11-15 DIAGNOSIS — K62.9 DISEASE OF ANUS AND RECTUM, UNSPECIFIED: Chronic | ICD-10-CM

## 2023-11-15 PROCEDURE — 76870 US EXAM SCROTUM: CPT

## 2023-11-15 PROCEDURE — 76870 US EXAM SCROTUM: CPT | Mod: 26

## 2023-11-17 ENCOUNTER — APPOINTMENT (OUTPATIENT)
Dept: UROLOGY | Facility: CLINIC | Age: 58
End: 2023-11-17
Payer: MEDICAID

## 2023-11-17 VITALS
OXYGEN SATURATION: 98 % | DIASTOLIC BLOOD PRESSURE: 72 MMHG | HEART RATE: 85 BPM | TEMPERATURE: 96.3 F | SYSTOLIC BLOOD PRESSURE: 105 MMHG

## 2023-11-17 DIAGNOSIS — L59.8 OTHER SPECIFIED DISORDERS OF THE SKIN AND SUBCUTANEOUS TISSUE RELATED TO RADIATION: ICD-10-CM
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
## 2023-11-17 PROCEDURE — 99214 OFFICE O/P EST MOD 30 MIN: CPT

## 2023-11-27 ENCOUNTER — RX RENEWAL (OUTPATIENT)
Age: 58
End: 2023-11-27

## 2023-11-29 ENCOUNTER — RX RENEWAL (OUTPATIENT)
Age: 58
End: 2023-11-29

## 2023-11-29 ENCOUNTER — APPOINTMENT (OUTPATIENT)
Dept: CARDIOLOGY | Facility: CLINIC | Age: 58
End: 2023-11-29
Payer: MEDICAID

## 2023-11-29 ENCOUNTER — APPOINTMENT (OUTPATIENT)
Dept: INTERNAL MEDICINE | Facility: CLINIC | Age: 58
End: 2023-11-29
Payer: MEDICAID

## 2023-11-29 ENCOUNTER — NON-APPOINTMENT (OUTPATIENT)
Age: 58
End: 2023-11-29

## 2023-11-29 VITALS
HEART RATE: 88 BPM | OXYGEN SATURATION: 96 % | BODY MASS INDEX: 30.24 KG/M2 | SYSTOLIC BLOOD PRESSURE: 106 MMHG | HEIGHT: 71 IN | DIASTOLIC BLOOD PRESSURE: 77 MMHG | TEMPERATURE: 97.5 F | WEIGHT: 216 LBS

## 2023-11-29 DIAGNOSIS — K59.09 OTHER CONSTIPATION: ICD-10-CM

## 2023-11-29 DIAGNOSIS — F17.201 NICOTINE DEPENDENCE, UNSPECIFIED, IN REMISSION: ICD-10-CM

## 2023-11-29 DIAGNOSIS — R94.31 ABNORMAL ELECTROCARDIOGRAM [ECG] [EKG]: ICD-10-CM

## 2023-11-29 DIAGNOSIS — Z01.818 ENCOUNTER FOR OTHER PREPROCEDURAL EXAMINATION: ICD-10-CM

## 2023-11-29 PROCEDURE — 99215 OFFICE O/P EST HI 40 MIN: CPT | Mod: 25

## 2023-11-29 PROCEDURE — 93000 ELECTROCARDIOGRAM COMPLETE: CPT

## 2023-11-29 PROCEDURE — 99214 OFFICE O/P EST MOD 30 MIN: CPT | Mod: 25

## 2023-11-30 ENCOUNTER — APPOINTMENT (OUTPATIENT)
Dept: NEUROLOGY | Facility: CLINIC | Age: 58
End: 2023-11-30

## 2023-12-01 ENCOUNTER — OUTPATIENT (OUTPATIENT)
Dept: OUTPATIENT SERVICES | Facility: HOSPITAL | Age: 58
LOS: 1 days | Discharge: HOME | End: 2023-12-01

## 2023-12-01 VITALS
WEIGHT: 215.61 LBS | HEIGHT: 71 IN | HEART RATE: 83 BPM | TEMPERATURE: 99 F | RESPIRATION RATE: 18 BRPM | DIASTOLIC BLOOD PRESSURE: 63 MMHG | SYSTOLIC BLOOD PRESSURE: 116 MMHG | OXYGEN SATURATION: 98 %

## 2023-12-01 DIAGNOSIS — G47.00 INSOMNIA, UNSPECIFIED: ICD-10-CM

## 2023-12-01 DIAGNOSIS — E78.5 HYPERLIPIDEMIA, UNSPECIFIED: ICD-10-CM

## 2023-12-01 DIAGNOSIS — K62.9 DISEASE OF ANUS AND RECTUM, UNSPECIFIED: Chronic | ICD-10-CM

## 2023-12-01 DIAGNOSIS — I10 ESSENTIAL (PRIMARY) HYPERTENSION: ICD-10-CM

## 2023-12-01 DIAGNOSIS — Z96.0 PRESENCE OF UROGENITAL IMPLANTS: Chronic | ICD-10-CM

## 2023-12-01 DIAGNOSIS — Z96.0 PRESENCE OF UROGENITAL IMPLANTS: ICD-10-CM

## 2023-12-01 DIAGNOSIS — Z93.3 COLOSTOMY STATUS: Chronic | ICD-10-CM

## 2023-12-01 DIAGNOSIS — F41.9 ANXIETY DISORDER, UNSPECIFIED: ICD-10-CM

## 2023-12-01 DIAGNOSIS — Z01.818 ENCOUNTER FOR OTHER PREPROCEDURAL EXAMINATION: ICD-10-CM

## 2023-12-01 DIAGNOSIS — C61 MALIGNANT NEOPLASM OF PROSTATE: ICD-10-CM

## 2023-12-01 DIAGNOSIS — Z98.890 OTHER SPECIFIED POSTPROCEDURAL STATES: Chronic | ICD-10-CM

## 2023-12-01 DIAGNOSIS — R10.9 UNSPECIFIED ABDOMINAL PAIN: ICD-10-CM

## 2023-12-01 DIAGNOSIS — B20 HUMAN IMMUNODEFICIENCY VIRUS [HIV] DISEASE: ICD-10-CM

## 2023-12-01 LAB
ALBUMIN SERPL ELPH-MCNC: 3.1 G/DL — LOW (ref 3.3–5)
ALBUMIN SERPL ELPH-MCNC: 3.1 G/DL — LOW (ref 3.3–5)
ALP SERPL-CCNC: 100 U/L — SIGNIFICANT CHANGE UP (ref 40–120)
ALP SERPL-CCNC: 100 U/L — SIGNIFICANT CHANGE UP (ref 40–120)
ALT FLD-CCNC: 14 U/L — SIGNIFICANT CHANGE UP (ref 12–78)
ALT FLD-CCNC: 14 U/L — SIGNIFICANT CHANGE UP (ref 12–78)
ANION GAP SERPL CALC-SCNC: 6 MMOL/L — SIGNIFICANT CHANGE UP (ref 5–17)
ANION GAP SERPL CALC-SCNC: 6 MMOL/L — SIGNIFICANT CHANGE UP (ref 5–17)
APPEARANCE UR: ABNORMAL
APPEARANCE UR: ABNORMAL
AST SERPL-CCNC: 7 U/L — LOW (ref 15–37)
AST SERPL-CCNC: 7 U/L — LOW (ref 15–37)
BACTERIA # UR AUTO: ABNORMAL /HPF
BACTERIA # UR AUTO: ABNORMAL /HPF
BASOPHILS # BLD AUTO: 0.04 K/UL — SIGNIFICANT CHANGE UP (ref 0–0.2)
BASOPHILS # BLD AUTO: 0.04 K/UL — SIGNIFICANT CHANGE UP (ref 0–0.2)
BASOPHILS NFR BLD AUTO: 0.7 % — SIGNIFICANT CHANGE UP (ref 0–2)
BASOPHILS NFR BLD AUTO: 0.7 % — SIGNIFICANT CHANGE UP (ref 0–2)
BILIRUB SERPL-MCNC: 0.2 MG/DL — SIGNIFICANT CHANGE UP (ref 0.2–1.2)
BILIRUB SERPL-MCNC: 0.2 MG/DL — SIGNIFICANT CHANGE UP (ref 0.2–1.2)
BILIRUB UR-MCNC: NEGATIVE — SIGNIFICANT CHANGE UP
BILIRUB UR-MCNC: NEGATIVE — SIGNIFICANT CHANGE UP
BUN SERPL-MCNC: 16 MG/DL — SIGNIFICANT CHANGE UP (ref 7–23)
BUN SERPL-MCNC: 16 MG/DL — SIGNIFICANT CHANGE UP (ref 7–23)
CALCIUM SERPL-MCNC: 8.8 MG/DL — SIGNIFICANT CHANGE UP (ref 8.5–10.1)
CALCIUM SERPL-MCNC: 8.8 MG/DL — SIGNIFICANT CHANGE UP (ref 8.5–10.1)
CHLORIDE SERPL-SCNC: 111 MMOL/L — HIGH (ref 96–108)
CHLORIDE SERPL-SCNC: 111 MMOL/L — HIGH (ref 96–108)
CO2 SERPL-SCNC: 27 MMOL/L — SIGNIFICANT CHANGE UP (ref 22–31)
CO2 SERPL-SCNC: 27 MMOL/L — SIGNIFICANT CHANGE UP (ref 22–31)
COLOR SPEC: ABNORMAL
COLOR SPEC: ABNORMAL
CREAT SERPL-MCNC: 0.98 MG/DL — SIGNIFICANT CHANGE UP (ref 0.5–1.3)
CREAT SERPL-MCNC: 0.98 MG/DL — SIGNIFICANT CHANGE UP (ref 0.5–1.3)
DIFF PNL FLD: ABNORMAL
DIFF PNL FLD: ABNORMAL
EGFR: 90 ML/MIN/1.73M2 — SIGNIFICANT CHANGE UP
EGFR: 90 ML/MIN/1.73M2 — SIGNIFICANT CHANGE UP
EOSINOPHIL # BLD AUTO: 0.05 K/UL — SIGNIFICANT CHANGE UP (ref 0–0.5)
EOSINOPHIL # BLD AUTO: 0.05 K/UL — SIGNIFICANT CHANGE UP (ref 0–0.5)
EOSINOPHIL NFR BLD AUTO: 0.9 % — SIGNIFICANT CHANGE UP (ref 0–6)
EOSINOPHIL NFR BLD AUTO: 0.9 % — SIGNIFICANT CHANGE UP (ref 0–6)
EPI CELLS # UR: PRESENT
EPI CELLS # UR: PRESENT
GLUCOSE SERPL-MCNC: 128 MG/DL — HIGH (ref 70–99)
GLUCOSE SERPL-MCNC: 128 MG/DL — HIGH (ref 70–99)
GLUCOSE UR QL: NEGATIVE MG/DL — SIGNIFICANT CHANGE UP
GLUCOSE UR QL: NEGATIVE MG/DL — SIGNIFICANT CHANGE UP
HCT VFR BLD CALC: 38 % — LOW (ref 39–50)
HCT VFR BLD CALC: 38 % — LOW (ref 39–50)
HGB BLD-MCNC: 12.4 G/DL — LOW (ref 13–17)
HGB BLD-MCNC: 12.4 G/DL — LOW (ref 13–17)
IMM GRANULOCYTES NFR BLD AUTO: 0.2 % — SIGNIFICANT CHANGE UP (ref 0–0.9)
IMM GRANULOCYTES NFR BLD AUTO: 0.2 % — SIGNIFICANT CHANGE UP (ref 0–0.9)
KETONES UR-MCNC: NEGATIVE MG/DL — SIGNIFICANT CHANGE UP
KETONES UR-MCNC: NEGATIVE MG/DL — SIGNIFICANT CHANGE UP
LEUKOCYTE ESTERASE UR-ACNC: ABNORMAL
LEUKOCYTE ESTERASE UR-ACNC: ABNORMAL
LYMPHOCYTES # BLD AUTO: 2.01 K/UL — SIGNIFICANT CHANGE UP (ref 1–3.3)
LYMPHOCYTES # BLD AUTO: 2.01 K/UL — SIGNIFICANT CHANGE UP (ref 1–3.3)
LYMPHOCYTES # BLD AUTO: 36.1 % — SIGNIFICANT CHANGE UP (ref 13–44)
LYMPHOCYTES # BLD AUTO: 36.1 % — SIGNIFICANT CHANGE UP (ref 13–44)
MCHC RBC-ENTMCNC: 28.8 PG — SIGNIFICANT CHANGE UP (ref 27–34)
MCHC RBC-ENTMCNC: 28.8 PG — SIGNIFICANT CHANGE UP (ref 27–34)
MCHC RBC-ENTMCNC: 32.6 G/DL — SIGNIFICANT CHANGE UP (ref 32–36)
MCHC RBC-ENTMCNC: 32.6 G/DL — SIGNIFICANT CHANGE UP (ref 32–36)
MCV RBC AUTO: 88.2 FL — SIGNIFICANT CHANGE UP (ref 80–100)
MCV RBC AUTO: 88.2 FL — SIGNIFICANT CHANGE UP (ref 80–100)
MONOCYTES # BLD AUTO: 0.73 K/UL — SIGNIFICANT CHANGE UP (ref 0–0.9)
MONOCYTES # BLD AUTO: 0.73 K/UL — SIGNIFICANT CHANGE UP (ref 0–0.9)
MONOCYTES NFR BLD AUTO: 13.1 % — SIGNIFICANT CHANGE UP (ref 2–14)
MONOCYTES NFR BLD AUTO: 13.1 % — SIGNIFICANT CHANGE UP (ref 2–14)
NEUTROPHILS # BLD AUTO: 2.73 K/UL — SIGNIFICANT CHANGE UP (ref 1.8–7.4)
NEUTROPHILS # BLD AUTO: 2.73 K/UL — SIGNIFICANT CHANGE UP (ref 1.8–7.4)
NEUTROPHILS NFR BLD AUTO: 49 % — SIGNIFICANT CHANGE UP (ref 43–77)
NEUTROPHILS NFR BLD AUTO: 49 % — SIGNIFICANT CHANGE UP (ref 43–77)
NITRITE UR-MCNC: NEGATIVE — SIGNIFICANT CHANGE UP
NITRITE UR-MCNC: NEGATIVE — SIGNIFICANT CHANGE UP
NRBC # BLD: 0 /100 WBCS — SIGNIFICANT CHANGE UP (ref 0–0)
NRBC # BLD: 0 /100 WBCS — SIGNIFICANT CHANGE UP (ref 0–0)
PH UR: 5.5 — SIGNIFICANT CHANGE UP (ref 5–8)
PH UR: 5.5 — SIGNIFICANT CHANGE UP (ref 5–8)
PLATELET # BLD AUTO: 286 K/UL — SIGNIFICANT CHANGE UP (ref 150–400)
PLATELET # BLD AUTO: 286 K/UL — SIGNIFICANT CHANGE UP (ref 150–400)
POTASSIUM SERPL-MCNC: 3.8 MMOL/L — SIGNIFICANT CHANGE UP (ref 3.5–5.3)
POTASSIUM SERPL-MCNC: 3.8 MMOL/L — SIGNIFICANT CHANGE UP (ref 3.5–5.3)
POTASSIUM SERPL-SCNC: 3.8 MMOL/L — SIGNIFICANT CHANGE UP (ref 3.5–5.3)
POTASSIUM SERPL-SCNC: 3.8 MMOL/L — SIGNIFICANT CHANGE UP (ref 3.5–5.3)
PROT SERPL-MCNC: 7.2 GM/DL — SIGNIFICANT CHANGE UP (ref 6–8.3)
PROT SERPL-MCNC: 7.2 GM/DL — SIGNIFICANT CHANGE UP (ref 6–8.3)
PROT UR-MCNC: 300 MG/DL
PROT UR-MCNC: 300 MG/DL
RBC # BLD: 4.31 M/UL — SIGNIFICANT CHANGE UP (ref 4.2–5.8)
RBC # BLD: 4.31 M/UL — SIGNIFICANT CHANGE UP (ref 4.2–5.8)
RBC # FLD: 13.8 % — SIGNIFICANT CHANGE UP (ref 10.3–14.5)
RBC # FLD: 13.8 % — SIGNIFICANT CHANGE UP (ref 10.3–14.5)
RBC CASTS # UR COMP ASSIST: ABNORMAL /HPF
RBC CASTS # UR COMP ASSIST: ABNORMAL /HPF
SODIUM SERPL-SCNC: 144 MMOL/L — SIGNIFICANT CHANGE UP (ref 135–145)
SODIUM SERPL-SCNC: 144 MMOL/L — SIGNIFICANT CHANGE UP (ref 135–145)
SP GR SPEC: 1.02 — SIGNIFICANT CHANGE UP (ref 1–1.03)
SP GR SPEC: 1.02 — SIGNIFICANT CHANGE UP (ref 1–1.03)
UROBILINOGEN FLD QL: 0.2 MG/DL — SIGNIFICANT CHANGE UP (ref 0.2–1)
UROBILINOGEN FLD QL: 0.2 MG/DL — SIGNIFICANT CHANGE UP (ref 0.2–1)
WBC # BLD: 5.57 K/UL — SIGNIFICANT CHANGE UP (ref 3.8–10.5)
WBC # BLD: 5.57 K/UL — SIGNIFICANT CHANGE UP (ref 3.8–10.5)
WBC # FLD AUTO: 5.57 K/UL — SIGNIFICANT CHANGE UP (ref 3.8–10.5)
WBC # FLD AUTO: 5.57 K/UL — SIGNIFICANT CHANGE UP (ref 3.8–10.5)
WBC UR QL: 25 /HPF — HIGH (ref 0–5)
WBC UR QL: 25 /HPF — HIGH (ref 0–5)

## 2023-12-01 RX ORDER — SODIUM CHLORIDE 9 MG/ML
3 INJECTION INTRAMUSCULAR; INTRAVENOUS; SUBCUTANEOUS EVERY 8 HOURS
Refills: 0 | Status: DISCONTINUED | OUTPATIENT
Start: 2023-12-13 | End: 2023-12-13

## 2023-12-01 RX ORDER — SODIUM CHLORIDE 9 MG/ML
3 INJECTION INTRAMUSCULAR; INTRAVENOUS; SUBCUTANEOUS EVERY 8 HOURS
Refills: 0 | Status: DISCONTINUED | OUTPATIENT
Start: 2023-12-01 | End: 2023-12-01

## 2023-12-01 RX ORDER — SODIUM CHLORIDE 9 MG/ML
1000 INJECTION, SOLUTION INTRAVENOUS
Refills: 0 | Status: DISCONTINUED | OUTPATIENT
Start: 2023-12-01 | End: 2023-12-01

## 2023-12-01 RX ORDER — ATORVASTATIN CALCIUM 80 MG/1
1 TABLET, FILM COATED ORAL
Refills: 0 | DISCHARGE

## 2023-12-01 NOTE — H&P PST ADULT - NSICDXPASTMEDICALHX_GEN_ALL_CORE_FT
PAST MEDICAL HISTORY:  Anal cancer     Anxiety     Depressive disorder     Diverticulosis     HIV infection     HLD (hyperlipidemia)     HTN (hypertension)     Lung cancer     Prostate cancer     Unspecified abdominal pain      PAST MEDICAL HISTORY:  Abdominal pain     Anal cancer     Anxiety     BPH (benign prostatic hyperplasia)     Depressive disorder     Diverticulosis     HIV infection     HLD (hyperlipidemia)     HTN (hypertension)     Insomnia     Lung cancer     Prostate cancer     Unspecified abdominal pain

## 2023-12-01 NOTE — H&P PST ADULT - HISTORY OF PRESENT ILLNESS
57y/o with medical h/o HIV (on Biktarvy), htn, prostate CA (s/p RT), anal CA (s/p RT/chemo), anxiety with calculus of kidney, reports Abdominal pain, presents today for PST for scheduled Gastroscopy on 10/27/2023   59y/o male came to dept with wife, with medical h/o HIV, HDL, HTN, Anxiety, BPH, Chronic abdominal pain on morphine and oxycodone - recently underwent Gastroscopy, pending results.  Pt reports Calculus of kidney w/bilateral stents, and presents today for PST for scheduled Cystoscopy, Bilateral Stent Replacement on 12/15/2023

## 2023-12-01 NOTE — H&P PST ADULT - NSICDXPASTSURGICALHX_GEN_ALL_CORE_FT
PAST SURGICAL HISTORY:  Anal lesion     S/P colostomy      PAST SURGICAL HISTORY:  Anal lesion     History of gastroscopy     S/P colostomy     Ureteral stent present

## 2023-12-01 NOTE — H&P PST ADULT - PROBLEM SELECTOR PLAN 1
Pre-op instructions given. Pt verbalized understanding  Pt instructed to hold all NSAIDs + herbal supplements/vitamins 7 days before surgery  Pending: lab result  Pending: Cardiology clearance + M/C as requested by surgeon

## 2023-12-01 NOTE — H&P PST ADULT - NEGATIVE PSYCHIATRIC SYMPTOMS
no suicidal ideation/no insomnia/no memory loss/no paranoia/no mood swings/no agitation no suicidal ideation/no memory loss/no paranoia/no mood swings/no agitation

## 2023-12-01 NOTE — H&P PST ADULT - NSSUBSTANCEUSE_GEN_ALL_CORE_SD
Decreased PO intake, generalized weakness since she returned from [place] last month denies illicit drug use/caffeine

## 2023-12-04 ENCOUNTER — LABORATORY RESULT (OUTPATIENT)
Age: 58
End: 2023-12-04

## 2023-12-05 ENCOUNTER — APPOINTMENT (OUTPATIENT)
Dept: DERMATOLOGY | Facility: CLINIC | Age: 58
End: 2023-12-05
Payer: MEDICAID

## 2023-12-05 PROBLEM — N40.0 BENIGN PROSTATIC HYPERPLASIA WITHOUT LOWER URINARY TRACT SYMPTOMS: Chronic | Status: ACTIVE | Noted: 2023-12-01

## 2023-12-05 PROCEDURE — 99203 OFFICE O/P NEW LOW 30 MIN: CPT

## 2023-12-06 ENCOUNTER — APPOINTMENT (OUTPATIENT)
Dept: GERIATRICS | Facility: CLINIC | Age: 58
End: 2023-12-06
Payer: MEDICAID

## 2023-12-06 PROCEDURE — 99215 OFFICE O/P EST HI 40 MIN: CPT | Mod: 95

## 2023-12-11 ENCOUNTER — LABORATORY RESULT (OUTPATIENT)
Age: 58
End: 2023-12-11

## 2023-12-11 ENCOUNTER — OUTPATIENT (OUTPATIENT)
Dept: OUTPATIENT SERVICES | Facility: HOSPITAL | Age: 58
LOS: 1 days | End: 2023-12-11
Payer: MEDICAID

## 2023-12-11 ENCOUNTER — APPOINTMENT (OUTPATIENT)
Dept: INFECTIOUS DISEASE | Facility: CLINIC | Age: 58
End: 2023-12-11
Payer: MEDICAID

## 2023-12-11 VITALS
HEART RATE: 85 BPM | SYSTOLIC BLOOD PRESSURE: 122 MMHG | OXYGEN SATURATION: 96 % | WEIGHT: 214 LBS | TEMPERATURE: 98.5 F | DIASTOLIC BLOOD PRESSURE: 89 MMHG | BODY MASS INDEX: 29.96 KG/M2 | HEIGHT: 71 IN

## 2023-12-11 DIAGNOSIS — Z96.0 PRESENCE OF UROGENITAL IMPLANTS: Chronic | ICD-10-CM

## 2023-12-11 DIAGNOSIS — C21.0 MALIGNANT NEOPLASM OF ANUS, UNSPECIFIED: ICD-10-CM

## 2023-12-11 DIAGNOSIS — N20.0 CALCULUS OF KIDNEY: ICD-10-CM

## 2023-12-11 DIAGNOSIS — B20 HUMAN IMMUNODEFICIENCY VIRUS [HIV] DISEASE: ICD-10-CM

## 2023-12-11 DIAGNOSIS — Z98.890 OTHER SPECIFIED POSTPROCEDURAL STATES: Chronic | ICD-10-CM

## 2023-12-11 DIAGNOSIS — N48.89 OTHER SPECIFIED DISORDERS OF PENIS: ICD-10-CM

## 2023-12-11 DIAGNOSIS — Z23 ENCOUNTER FOR IMMUNIZATION: ICD-10-CM

## 2023-12-11 PROCEDURE — 99215 OFFICE O/P EST HI 40 MIN: CPT

## 2023-12-11 PROCEDURE — G0463: CPT | Mod: 25

## 2023-12-11 PROCEDURE — 90688 IIV4 VACCINE SPLT 0.5 ML IM: CPT

## 2023-12-11 PROCEDURE — G0008: CPT

## 2023-12-11 PROCEDURE — 81001 URINALYSIS AUTO W/SCOPE: CPT

## 2023-12-11 PROCEDURE — 86480 TB TEST CELL IMMUN MEASURE: CPT

## 2023-12-11 PROCEDURE — 87086 URINE CULTURE/COLONY COUNT: CPT

## 2023-12-11 PROCEDURE — 87536 HIV-1 QUANT&REVRSE TRNSCRPJ: CPT

## 2023-12-11 PROCEDURE — 82306 VITAMIN D 25 HYDROXY: CPT

## 2023-12-11 PROCEDURE — 36415 COLL VENOUS BLD VENIPUNCTURE: CPT

## 2023-12-11 PROCEDURE — 80061 LIPID PANEL: CPT

## 2023-12-11 PROCEDURE — 86360 T CELL ABSOLUTE COUNT/RATIO: CPT

## 2023-12-11 PROCEDURE — 86803 HEPATITIS C AB TEST: CPT

## 2023-12-11 PROCEDURE — 83036 HEMOGLOBIN GLYCOSYLATED A1C: CPT

## 2023-12-11 PROCEDURE — 87521 HEPATITIS C PROBE&RVRS TRNSC: CPT

## 2023-12-11 PROCEDURE — 86359 T CELLS TOTAL COUNT: CPT

## 2023-12-12 DIAGNOSIS — E55.9 VITAMIN D DEFICIENCY, UNSPECIFIED: ICD-10-CM

## 2023-12-12 LAB
24R-OH-CALCIDIOL SERPL-MCNC: 21.8 NG/ML — LOW (ref 30–80)
24R-OH-CALCIDIOL SERPL-MCNC: 21.8 NG/ML — LOW (ref 30–80)
4/8 RATIO: 0.3 RATIO — LOW (ref 0.9–3.6)
4/8 RATIO: 0.3 RATIO — LOW (ref 0.9–3.6)
A1C WITH ESTIMATED AVERAGE GLUCOSE RESULT: 5.8 % — HIGH (ref 4–5.6)
A1C WITH ESTIMATED AVERAGE GLUCOSE RESULT: 5.8 % — HIGH (ref 4–5.6)
ABS CD8: 1942 CELLS/UL — HIGH (ref 142–740)
ABS CD8: 1942 CELLS/UL — HIGH (ref 142–740)
APPEARANCE UR: ABNORMAL
APPEARANCE UR: ABNORMAL
BACTERIA # UR AUTO: NEGATIVE /HPF — SIGNIFICANT CHANGE UP
BACTERIA # UR AUTO: NEGATIVE /HPF — SIGNIFICANT CHANGE UP
BILIRUB UR-MCNC: NEGATIVE — SIGNIFICANT CHANGE UP
BILIRUB UR-MCNC: NEGATIVE — SIGNIFICANT CHANGE UP
CAST: 3 /LPF — SIGNIFICANT CHANGE UP (ref 0–4)
CAST: 3 /LPF — SIGNIFICANT CHANGE UP (ref 0–4)
CD3 BLASTS SPEC-ACNC: 2526 CELLS/UL — HIGH (ref 672–1870)
CD3 BLASTS SPEC-ACNC: 2526 CELLS/UL — HIGH (ref 672–1870)
CD3 BLASTS SPEC-ACNC: 89 % — HIGH (ref 59–83)
CD3 BLASTS SPEC-ACNC: 89 % — HIGH (ref 59–83)
CD4 %: 20 % — LOW (ref 30–62)
CD4 %: 20 % — LOW (ref 30–62)
CD8 %: 68 % — HIGH (ref 12–36)
CD8 %: 68 % — HIGH (ref 12–36)
CHOLEST SERPL-MCNC: 176 MG/DL — SIGNIFICANT CHANGE UP
CHOLEST SERPL-MCNC: 176 MG/DL — SIGNIFICANT CHANGE UP
COLOR SPEC: YELLOW — SIGNIFICANT CHANGE UP
COLOR SPEC: YELLOW — SIGNIFICANT CHANGE UP
CULTURE RESULTS: SIGNIFICANT CHANGE UP
CULTURE RESULTS: SIGNIFICANT CHANGE UP
DIFF PNL FLD: ABNORMAL
DIFF PNL FLD: ABNORMAL
ESTIMATED AVERAGE GLUCOSE: 120 MG/DL — HIGH (ref 68–114)
ESTIMATED AVERAGE GLUCOSE: 120 MG/DL — HIGH (ref 68–114)
GLUCOSE UR QL: NEGATIVE MG/DL — SIGNIFICANT CHANGE UP
GLUCOSE UR QL: NEGATIVE MG/DL — SIGNIFICANT CHANGE UP
HCV AB S/CO SERPL IA: 2.78 S/CO — HIGH (ref 0–0.99)
HCV AB S/CO SERPL IA: 2.78 S/CO — HIGH (ref 0–0.99)
HCV AB SERPL-IMP: ABNORMAL
HCV AB SERPL-IMP: ABNORMAL
HCV RNA FLD QL NAA+PROBE: SIGNIFICANT CHANGE UP
HCV RNA FLD QL NAA+PROBE: SIGNIFICANT CHANGE UP
HCV RNA SPEC QL PROBE+SIG AMP: SIGNIFICANT CHANGE UP
HCV RNA SPEC QL PROBE+SIG AMP: SIGNIFICANT CHANGE UP
HDLC SERPL-MCNC: 29 MG/DL — LOW
HDLC SERPL-MCNC: 29 MG/DL — LOW
HIV-1 VIRAL LOAD RESULT: SIGNIFICANT CHANGE UP
HIV-1 VIRAL LOAD RESULT: SIGNIFICANT CHANGE UP
HIV1 RNA # SERPL NAA+PROBE: SIGNIFICANT CHANGE UP COPIES/ML
HIV1 RNA # SERPL NAA+PROBE: SIGNIFICANT CHANGE UP COPIES/ML
HIV1 RNA SER-IMP: SIGNIFICANT CHANGE UP
HIV1 RNA SER-IMP: SIGNIFICANT CHANGE UP
HIV1 RNA SERPL NAA+PROBE-ACNC: SIGNIFICANT CHANGE UP
HIV1 RNA SERPL NAA+PROBE-ACNC: SIGNIFICANT CHANGE UP
HIV1 RNA SERPL NAA+PROBE-LOG#: SIGNIFICANT CHANGE UP LG COP/ML
HIV1 RNA SERPL NAA+PROBE-LOG#: SIGNIFICANT CHANGE UP LG COP/ML
KETONES UR-MCNC: NEGATIVE MG/DL — SIGNIFICANT CHANGE UP
KETONES UR-MCNC: NEGATIVE MG/DL — SIGNIFICANT CHANGE UP
LEUKOCYTE ESTERASE UR-ACNC: ABNORMAL
LEUKOCYTE ESTERASE UR-ACNC: ABNORMAL
LIPID PNL WITH DIRECT LDL SERPL: 106 MG/DL — HIGH
LIPID PNL WITH DIRECT LDL SERPL: 106 MG/DL — HIGH
NITRITE UR-MCNC: NEGATIVE — SIGNIFICANT CHANGE UP
NITRITE UR-MCNC: NEGATIVE — SIGNIFICANT CHANGE UP
NON HDL CHOLESTEROL: 146 MG/DL — HIGH
NON HDL CHOLESTEROL: 146 MG/DL — HIGH
PH UR: 6.5 — SIGNIFICANT CHANGE UP (ref 5–8)
PH UR: 6.5 — SIGNIFICANT CHANGE UP (ref 5–8)
PROT UR-MCNC: 300 MG/DL
PROT UR-MCNC: 300 MG/DL
RBC CASTS # UR COMP ASSIST: 416 /HPF — HIGH (ref 0–4)
RBC CASTS # UR COMP ASSIST: 416 /HPF — HIGH (ref 0–4)
SP GR SPEC: 1.02 — SIGNIFICANT CHANGE UP (ref 1–1.03)
SP GR SPEC: 1.02 — SIGNIFICANT CHANGE UP (ref 1–1.03)
SPECIMEN SOURCE: SIGNIFICANT CHANGE UP
SPECIMEN SOURCE: SIGNIFICANT CHANGE UP
SQUAMOUS # UR AUTO: 4 /HPF — SIGNIFICANT CHANGE UP (ref 0–5)
SQUAMOUS # UR AUTO: 4 /HPF — SIGNIFICANT CHANGE UP (ref 0–5)
T-CELL CD4 SUBSET PNL BLD: 579 CELLS/UL — SIGNIFICANT CHANGE UP (ref 489–1457)
T-CELL CD4 SUBSET PNL BLD: 579 CELLS/UL — SIGNIFICANT CHANGE UP (ref 489–1457)
TRIGL SERPL-MCNC: 233 MG/DL — HIGH
TRIGL SERPL-MCNC: 233 MG/DL — HIGH
UROBILINOGEN FLD QL: 0.2 MG/DL — SIGNIFICANT CHANGE UP (ref 0.2–1)
UROBILINOGEN FLD QL: 0.2 MG/DL — SIGNIFICANT CHANGE UP (ref 0.2–1)
WBC UR QL: 68 /HPF — HIGH (ref 0–5)
WBC UR QL: 68 /HPF — HIGH (ref 0–5)

## 2023-12-12 RX ORDER — BICTEGRAVIR SODIUM, EMTRICITABINE, AND TENOFOVIR ALAFENAMIDE FUMARATE 50; 200; 25 MG/1; MG/1; MG/1
50-200-25 TABLET ORAL
Qty: 30 | Refills: 6 | Status: ACTIVE | COMMUNITY
Start: 2022-10-20 | End: 1900-01-01

## 2023-12-13 ENCOUNTER — OUTPATIENT (OUTPATIENT)
Dept: OUTPATIENT SERVICES | Facility: HOSPITAL | Age: 58
LOS: 1 days | Discharge: HOME | End: 2023-12-13
Payer: MEDICAID

## 2023-12-13 ENCOUNTER — TRANSCRIPTION ENCOUNTER (OUTPATIENT)
Age: 58
End: 2023-12-13

## 2023-12-13 ENCOUNTER — APPOINTMENT (OUTPATIENT)
Dept: UROLOGY | Facility: HOSPITAL | Age: 58
End: 2023-12-13

## 2023-12-13 VITALS
HEART RATE: 92 BPM | TEMPERATURE: 98 F | RESPIRATION RATE: 18 BRPM | OXYGEN SATURATION: 98 % | DIASTOLIC BLOOD PRESSURE: 82 MMHG | SYSTOLIC BLOOD PRESSURE: 134 MMHG

## 2023-12-13 VITALS
HEIGHT: 71 IN | HEART RATE: 83 BPM | DIASTOLIC BLOOD PRESSURE: 83 MMHG | TEMPERATURE: 98 F | WEIGHT: 215.61 LBS | RESPIRATION RATE: 17 BRPM | SYSTOLIC BLOOD PRESSURE: 115 MMHG | OXYGEN SATURATION: 97 %

## 2023-12-13 DIAGNOSIS — K62.9 DISEASE OF ANUS AND RECTUM, UNSPECIFIED: Chronic | ICD-10-CM

## 2023-12-13 DIAGNOSIS — Z96.0 PRESENCE OF UROGENITAL IMPLANTS: Chronic | ICD-10-CM

## 2023-12-13 DIAGNOSIS — Z98.890 OTHER SPECIFIED POSTPROCEDURAL STATES: Chronic | ICD-10-CM

## 2023-12-13 DIAGNOSIS — Z93.3 COLOSTOMY STATUS: Chronic | ICD-10-CM

## 2023-12-13 PROCEDURE — 52332 CYSTOSCOPY AND TREATMENT: CPT | Mod: 50

## 2023-12-13 PROCEDURE — 74420 UROGRAPHY RTRGR +-KUB: CPT | Mod: 26

## 2023-12-13 DEVICE — URETERAL CATH OPEN END FLEXI-TIP 5FR .038" X 70CM: Type: IMPLANTABLE DEVICE | Site: BILATERAL | Status: FUNCTIONAL

## 2023-12-13 DEVICE — IMPLANTABLE DEVICE: Type: IMPLANTABLE DEVICE | Site: BILATERAL | Status: FUNCTIONAL

## 2023-12-13 RX ORDER — FUROSEMIDE 40 MG
1 TABLET ORAL
Refills: 0 | DISCHARGE

## 2023-12-13 RX ORDER — ACETAMINOPHEN 500 MG
1000 TABLET ORAL ONCE
Refills: 0 | Status: DISCONTINUED | OUTPATIENT
Start: 2023-12-13 | End: 2023-12-15

## 2023-12-13 RX ORDER — SODIUM CHLORIDE 9 MG/ML
1000 INJECTION, SOLUTION INTRAVENOUS
Refills: 0 | Status: DISCONTINUED | OUTPATIENT
Start: 2023-12-13 | End: 2023-12-15

## 2023-12-13 RX ORDER — OXYCODONE HYDROCHLORIDE 5 MG/1
1 TABLET ORAL
Refills: 0 | DISCHARGE

## 2023-12-13 RX ORDER — HYDROMORPHONE HYDROCHLORIDE 2 MG/ML
1 INJECTION INTRAMUSCULAR; INTRAVENOUS; SUBCUTANEOUS
Refills: 0 | Status: DISCONTINUED | OUTPATIENT
Start: 2023-12-13 | End: 2023-12-15

## 2023-12-13 RX ORDER — CIPROFLOXACIN LACTATE 400MG/40ML
1 VIAL (ML) INTRAVENOUS
Qty: 4 | Refills: 0
Start: 2023-12-13 | End: 2023-12-14

## 2023-12-13 RX ORDER — MORPHINE SULFATE 50 MG/1
0 CAPSULE, EXTENDED RELEASE ORAL
Refills: 0 | DISCHARGE

## 2023-12-13 RX ORDER — HYDROMORPHONE HYDROCHLORIDE 2 MG/ML
0.5 INJECTION INTRAMUSCULAR; INTRAVENOUS; SUBCUTANEOUS
Refills: 0 | Status: DISCONTINUED | OUTPATIENT
Start: 2023-12-13 | End: 2023-12-15

## 2023-12-13 RX ORDER — ONDANSETRON 8 MG/1
4 TABLET, FILM COATED ORAL ONCE
Refills: 0 | Status: DISCONTINUED | OUTPATIENT
Start: 2023-12-13 | End: 2023-12-15

## 2023-12-13 RX ADMIN — SODIUM CHLORIDE 125 MILLILITER(S): 9 INJECTION, SOLUTION INTRAVENOUS at 17:18

## 2023-12-13 RX ADMIN — HYDROMORPHONE HYDROCHLORIDE 0.5 MILLIGRAM(S): 2 INJECTION INTRAMUSCULAR; INTRAVENOUS; SUBCUTANEOUS at 17:18

## 2023-12-13 RX ADMIN — SODIUM CHLORIDE 3 MILLILITER(S): 9 INJECTION INTRAMUSCULAR; INTRAVENOUS; SUBCUTANEOUS at 13:49

## 2023-12-13 NOTE — ASU PATIENT PROFILE, ADULT - NSICDXPASTSURGICALHX_GEN_ALL_CORE_FT
PAST SURGICAL HISTORY:  Anal lesion     History of gastroscopy     S/P colostomy     Ureteral stent present

## 2023-12-13 NOTE — BRIEF OPERATIVE NOTE - NSICDXBRIEFPOSTOP_GEN_ALL_CORE_FT
POST-OP DIAGNOSIS:  Ureteral stricture 13-Dec-2023 17:05:34  Rvai Mathew   POST-OP DIAGNOSIS:  Ureteral stricture 13-Dec-2023 17:05:34  Ravi Mathew

## 2023-12-13 NOTE — ASU DISCHARGE PLAN (ADULT/PEDIATRIC) - CARE PROVIDER_API CALL
Ravi Mathew  Urology  [address] [facility]  [place] [address]  Phone: [phone]  Fax: [phone]  Follow Up Time: 1 week   Ravi Mathew  Urology  [address] [facility]  [place] [id number]  Phone: [phone]  Fax: [phone]  Follow Up Time: 1 week

## 2023-12-13 NOTE — ASU PATIENT PROFILE, ADULT - FALL HARM RISK - UNIVERSAL INTERVENTIONS
Bed in lowest position, wheels locked, appropriate side rails in place/Call bell, personal items and telephone in reach/Instruct patient to call for assistance before getting out of bed or chair/Non-slip footwear when patient is out of bed/[place] to call system/Physically safe environment - no spills, clutter or unnecessary equipment/Purposeful Proactive Rounding/Room/bathroom lighting operational, light cord in reach Bed in lowest position, wheels locked, appropriate side rails in place/Call bell, personal items and telephone in reach/Instruct patient to call for assistance before getting out of bed or chair/Non-slip footwear when patient is out of bed/[place] to call system/Physically safe environment - no spills, clutter or unnecessary equipment/Purposeful Proactive Rounding/Room/bathroom lighting operational, light cord in reach

## 2023-12-13 NOTE — ASU DISCHARGE PLAN (ADULT/PEDIATRIC) - PROVIDER TOKENS
PROVIDER:[TOKEN:[[id number]],FOLLOWUP:[1 week]] PROVIDER:[TOKEN:[[id number]],FOLLOWUP:[1 week]]

## 2023-12-13 NOTE — ASU DISCHARGE PLAN (ADULT/PEDIATRIC) - NS MD DC FALL RISK RISK
For information on Fall & Injury Prevention, visit: [email or web address]://www.[email or web address]/[email or web address]-prevention-protects-and-maintains-health-and-mobility OR  https://www.[facility]/news/fall-prevention-tips-[email or web address]-injury OR  https://www.cdc.gov/steadi/patient.html For information on Fall & Injury Prevention, visit: [email or web address]://www.[facility]/news/fall-prevention-protects-and-maintains-health-and-mobility OR  https://www.[facility]/news/fall-prevention-tips-to-avoid-injury OR  https://www.cdc.gov/steadi/patient.html

## 2023-12-13 NOTE — ASU PATIENT PROFILE, ADULT - NSICDXPASTMEDICALHX_GEN_ALL_CORE_FT
PAST MEDICAL HISTORY:  Abdominal pain     Anal cancer     Anxiety     BPH (benign prostatic hyperplasia)     Depressive disorder     Diverticulosis     HIV infection     HLD (hyperlipidemia)     HTN (hypertension)     Insomnia     Lung cancer     Prostate cancer     Unspecified abdominal pain

## 2023-12-14 LAB
GAMMA INTERFERON BACKGROUND BLD IA-ACNC: 0.05 IU/ML — SIGNIFICANT CHANGE UP
GAMMA INTERFERON BACKGROUND BLD IA-ACNC: 0.05 IU/ML — SIGNIFICANT CHANGE UP
M TB IFN-G BLD-IMP: NEGATIVE — SIGNIFICANT CHANGE UP
M TB IFN-G BLD-IMP: NEGATIVE — SIGNIFICANT CHANGE UP
M TB IFN-G CD4+ BCKGRND COR BLD-ACNC: -0.01 IU/ML — SIGNIFICANT CHANGE UP
M TB IFN-G CD4+ BCKGRND COR BLD-ACNC: -0.01 IU/ML — SIGNIFICANT CHANGE UP
M TB IFN-G CD4+CD8+ BCKGRND COR BLD-ACNC: 0 IU/ML — SIGNIFICANT CHANGE UP
M TB IFN-G CD4+CD8+ BCKGRND COR BLD-ACNC: 0 IU/ML — SIGNIFICANT CHANGE UP
QUANT TB PLUS MITOGEN MINUS NIL: 8.89 IU/ML — SIGNIFICANT CHANGE UP
QUANT TB PLUS MITOGEN MINUS NIL: 8.89 IU/ML — SIGNIFICANT CHANGE UP

## 2023-12-14 NOTE — ED ADULT TRIAGE NOTE - HEIGHT IN INCHES
AMBULATORY CASE MANAGEMENT NOTE    Name and Relationship of Patient/Support Person: ZULMA SELF - Emergency Contact    CCM Interim Update    Spoke briefly with patient's daughter Zulma. Explained purpose of call. She is interested but is at work and they are short handed. She requested return call tomorrow in morning.         Education Documentation  No documentation found.        Xochitl SHAW  Ambulatory Case Management    12/14/2023, 14:30 EST  
11

## 2023-12-18 DIAGNOSIS — F32.A DEPRESSION, UNSPECIFIED: ICD-10-CM

## 2023-12-18 DIAGNOSIS — F41.9 ANXIETY DISORDER, UNSPECIFIED: ICD-10-CM

## 2023-12-18 DIAGNOSIS — Z85.46 PERSONAL HISTORY OF MALIGNANT NEOPLASM OF PROSTATE: ICD-10-CM

## 2023-12-18 DIAGNOSIS — I10 ESSENTIAL (PRIMARY) HYPERTENSION: ICD-10-CM

## 2023-12-18 DIAGNOSIS — N13.5 CROSSING VESSEL AND STRICTURE OF URETER WITHOUT HYDRONEPHROSIS: ICD-10-CM

## 2023-12-18 DIAGNOSIS — Z85.118 PERSONAL HISTORY OF OTHER MALIGNANT NEOPLASM OF BRONCHUS AND LUNG: ICD-10-CM

## 2023-12-18 DIAGNOSIS — N40.0 BENIGN PROSTATIC HYPERPLASIA WITHOUT LOWER URINARY TRACT SYMPTOMS: ICD-10-CM

## 2023-12-18 DIAGNOSIS — Z85.048 PERSONAL HISTORY OF OTHER MALIGNANT NEOPLASM OF RECTUM, RECTOSIGMOID JUNCTION, AND ANUS: ICD-10-CM

## 2023-12-18 DIAGNOSIS — Z21 ASYMPTOMATIC HUMAN IMMUNODEFICIENCY VIRUS [HIV] INFECTION STATUS: ICD-10-CM

## 2023-12-18 DIAGNOSIS — E78.5 HYPERLIPIDEMIA, UNSPECIFIED: ICD-10-CM

## 2023-12-20 ENCOUNTER — APPOINTMENT (OUTPATIENT)
Dept: GERIATRICS | Facility: CLINIC | Age: 58
End: 2023-12-20
Payer: MEDICAID

## 2023-12-20 PROCEDURE — 99214 OFFICE O/P EST MOD 30 MIN: CPT | Mod: 95

## 2023-12-20 RX ORDER — OXYCODONE 10 MG/1
10 TABLET ORAL EVERY 4 HOURS
Qty: 90 | Refills: 0 | Status: ACTIVE | COMMUNITY
Start: 2023-10-17 | End: 1900-01-01

## 2023-12-20 NOTE — REASON FOR VISIT
[Follow-Up] : a follow-up visit [Spouse] : spouse [Home] : at home, [unfilled] , at the time of the visit. [Medical Office: ([facility])___] : at the medical office located in  [Verbal consent obtained from patient] : the patient, [unfilled]

## 2023-12-21 ENCOUNTER — RX RENEWAL (OUTPATIENT)
Age: 58
End: 2023-12-21

## 2023-12-21 NOTE — HISTORY OF PRESENT ILLNESS
[FreeTextEntry1] : 58yoM with anal cancer presents for follow-up palliative care visit, referred by Oncology.   PMH significant for prostate cancer s/p RT in 2016 (on Lupron), HIV on Biktarvy.  In 2020 at age of 55 patient had his screening colonoscopy performed by Dr. Lawrence in June 2020 that as per patient was informed to be within normal limits. Subsequently patient had felt a bump in the perirectal area and at the time was told by GI that this could be a hemorrhoid and was treated as such. His symptoms did improve and was eventually referred to Dr. Avilez for further evaluation and in March 2021 patient was seen by Dr. Avilez and underwent an exam and biopsy in April that revealed evidence of anal squamous cell carcinoma. Patient was subsequently referred for evaluation and treatment. Patient did not have full excision of the tumor rather a excisional biopsy.  Main reason for which patient is referred is pain which has been an issue for him for the last 3 months. The pain is localized to the anus/rectum. It is severe, not well controlled. Worse with defecation, sitting. At its worst the pain is 10/10. Rates it as 8/10 presently.   He is currently using Oxycodone IR 20mg PRN, takes this every 6 hours. He was previously tried on transdermal fentanyl 25mcg/hr and oxycontin but he states that neither helped his pain. He no longer uses either.  Has tried using anusol suppository without relief.   Underwent EUA by Dr. Avilez on 11/26/21 for evaluation of persistent kandy-anal pain and a 4 x 4 cm ulcer was found but no fissure or fistula. A biopsy was performed and was negative for dysplasia or malignancy.     He was admitted [facility] (3/17-4/2/2022) for acute on chronic rectal pain associated w/ hematochezia and constipation.  MRI c/f perianal abscess.  He went to the OR on 3/25; severe induration seen circumferentially with ulcerated mucosa and recessed cavity on left side. No definite abscess seen therefore no drainage or sphincterotomy performed. He is s/p diverting colostomy 3/30.   Interval History (12/20/23): Patient seen for follow up visit via telemedicine. He is s/p abdominoperitoneal resection with end colostomy (August 2022) with ongoing post-surgical pain.  His pain is localized to the mid abdomen, at the incision line from his laparotomy.  The pain is sharp, knife-like. The pain is constant, although worsens with movement and eating. He finds pain relief with laying on his side with his legs to his abdomen. Pain reaches 8/10, PRN analgesics bring the pain level down to 4/10.  His pain regimen was recently adjusted which has provided him better pain control. He is using MS ER TID to 15/15/30mg and PRN Oxycodone IR 10mg Q4h, which he requires two to three times a day. He reports running out of MS ER a few days ago and has been getting by with PRN oxycodone in the meantime. He supplements with PRN Tylenol 1000mg.  Additionally, he reports constant penile pain, numbness and a feeling of swelling that has been an issue for him for the last few months. He is scheduled for a neurologist evaluation to evaluate numbness and penile pain on January 9th. He has been having urinary incontinence, wears Depends. He was following with his urologist Dr. Mathews in [place], who is not affiliated with [facility].  He is s/p a stent exchange with his [facility] Urologist Dr. Mathew.  Sleep disturbances 2/2 pain, he requires a dose of PRN analgesia overnight several times a week. Uses Zyprexa 10mg QHS and Ambien QHS. Anxiety managed with PRN alprazolam 1mg, sparingly.    ROS: + appetite is poor + anxiety d/o - uses alprazolam 1mg PRN, olanzapine 10mg QHS + insomnia - is on long-standing ambien 10mg QHS  +constipation - uses lactulose daily, stools have been loose +b/l pedal edema - was started on furosemide 20mg by PCP, has taken for about 3 days, did not notice any improvement +BP has been high due to his uncontrolled pain +hematuria - ongoing since b/l stent placement Denies nausea/vomiting, diarrhea. All other ROS as outlined or noncontributory.   Patient is , lives with his wife.  He is not presently driving, uses a car service to get to medical appointments.  Quit smoking tobacco about two years ago. No EtOH.  Passes time by watching TV.   PCP: Dr. Jarad Schwartz Psychiatrist: Dr. Eric Yin ([facility]) Urologist: Dr. Mathew ([facility]) Dr. Manuel Mathews ([phone])  I-Stop Ref#: [id number]

## 2023-12-21 NOTE — DATA REVIEWED
[FreeTextEntry1] : MR PELVIS RECTAL ANAL WAW IC  (09/22/2023):   COMPARISON: CT 9/14/2023, 8/17/2023, 9/30/2022  FINDINGS:  BLADDER: Relatively decompressed. Bilateral ureteral stents are partially visualized with distal pigtails in the bladder lumen. REPRODUCTIVE ORGANS: Normal sized prostate gland. Diffuse T2 hypointense signal throughout the prostate peripheral zone likely represent sequela of age-indeterminate infection or inflammation. Seminal vesicles are decompressed.  BOWEL: Status post abdominoperineal resection with VRAM flap perineal reconstruction. Along the left posterolateral aspect of the flap, there is a thin rim-enhancing T2 hyperintense 1.5 cm collection that demonstrates restricted diffusion, possibly representing small abscess (3; 11). In retrospect, this is similar to CT 9/14/2023. PERITONEUM: No ascites. VESSELS: Within normal limits. RETROPERITONEUM/LYMPH NODES: No lymphadenopathy. ABDOMINAL WALL: Postsurgical changes in the anterior abdominal wall. Mild enhancement in the subcutaneous soft tissues adjacent to the coccyx. Previously seen collection on CT 8/17/2023 is no longer present. BONES: Within normal limits.  IMPRESSION:  1.5 cm fat rim-enhancing collection at the left posterolateral aspect of the perineal flap most likely representing a small abscess.  Previously seen collection at adjacent to the coccyx is no longer present. Mild persistent enhancement and phlegmonous change.

## 2023-12-21 NOTE — ASSESSMENT
[FreeTextEntry1] : 58yoM with:  # Anal Cancer - s/p 5400cGy/ 15 fractions completed on 8/16/21 with Mitomycin Day #1. Xeloda discontinued 2/2 GI issues. Follow up with Med Onc, Rad Onc,   # Abdominal pain - Etiology of this pain remains unclear. He appears to have developed hypersensitivity, hyperalgesia/hyperesthesia along the surgical incision line.   - C/w MS ER TID to 15/15/30mg and PRN Oxycodone IR 10mg Q4h. Patient is to log usage of PRNs. - Communication previously sent to patient's gastroenterologist, Dr. Robert Brunner.   # Penile pain and swelling - S/p US testicles 11/15; Unknown etiology. Follow up with urologist, Dr. Mathew on 12/13; appreciate urology input.  Appreciate neurologist input.   # Anxiety d/o - On PRN alprazolam 1mg. Follows closely with psychiatry.  # Encounter for Palliative Care - Emotional support provided.  Follow up in 2 weeks, call sooner with questions or issues.

## 2023-12-21 NOTE — PHYSICAL EXAM
[General Appearance - Alert] : alert [General Appearance - In No Acute Distress] : in no acute distress [Oriented To Time, Place, And Person] : oriented to person, place, and time [Affect] : the affect was normal [FreeTextEntry1] : flat affect

## 2024-01-02 NOTE — REASON FOR VISIT
[Follow-Up] : a follow-up visit [Spouse] : spouse [Home] : at home, [unfilled] , at the time of the visit. [Medical Office: ([facility])___] : at the medical office located in  [Verbal consent obtained from patient] : the patient, [unfilled]

## 2024-01-03 ENCOUNTER — APPOINTMENT (OUTPATIENT)
Dept: GERIATRICS | Facility: CLINIC | Age: 59
End: 2024-01-03
Payer: MEDICAID

## 2024-01-03 DIAGNOSIS — Z51.5 ENCOUNTER FOR PALLIATIVE CARE: ICD-10-CM

## 2024-01-03 PROCEDURE — 99214 OFFICE O/P EST MOD 30 MIN: CPT | Mod: 95

## 2024-01-03 RX ORDER — MORPHINE SULFATE 15 MG/1
15 TABLET, FILM COATED, EXTENDED RELEASE ORAL
Qty: 150 | Refills: 0 | Status: ACTIVE | COMMUNITY
Start: 2023-11-06 | End: 1900-01-01

## 2024-01-04 PROBLEM — Z51.5 ENCOUNTER FOR PALLIATIVE CARE: Status: ACTIVE | Noted: 2021-12-02

## 2024-01-04 NOTE — ASSESSMENT
[FreeTextEntry1] : 58yoM with:  # Anal Cancer - s/p 5400cGy/ 15 fractions completed on 8/16/21 with Mitomycin Day #1. Xeloda discontinued 2/2 GI issues. Follow up with Med Onc, Rad Onc,   # Abdominal pain - Etiology of this pain remains unclear. He appears to have developed hypersensitivity, hyperalgesia/hyperesthesia along the surgical incision line.   - Increase MS ER TID to 30/15/30mg and PRN Oxycodone IR 10mg Q4h. Patient is to log usage of PRNs. - Communication previously sent to patient's gastroenterologist, Dr. Robert Brunner.  - Educated on the importance of maintaining bowel regularity in light of opioid use.  # Penile pain and swelling - S/p US testicles 11/15; Unknown etiology. Follow up with urologist, Dr. Mathew on 12/13; appreciate urology input.  Appreciate neurologist input.   # Anxiety d/o - On PRN alprazolam 1mg. Follows closely with psychiatry.  # Encounter for Palliative Care - Emotional support provided.  Follow up in 1 month, call sooner with questions or issues.

## 2024-01-04 NOTE — PHYSICAL EXAM
[General Appearance - Alert] : alert [General Appearance - In No Acute Distress] : in no acute distress [Affect] : the affect was normal [Oriented To Time, Place, And Person] : oriented to person, place, and time [General Appearance - Well Developed] : well developed [FreeTextEntry1] : flat affect

## 2024-01-04 NOTE — HISTORY OF PRESENT ILLNESS
[FreeTextEntry1] : 58yoM with anal cancer presents for follow-up palliative care visit, referred by Oncology.   PMH significant for prostate cancer s/p RT in 2016 (on Lupron), HIV on Biktarvy.  In 2020 at age of 55 patient had his screening colonoscopy performed by Dr. Lawrence in June 2020 that as per patient was informed to be within normal limits. Subsequently patient had felt a bump in the perirectal area and at the time was told by GI that this could be a hemorrhoid and was treated as such. His symptoms did improve and was eventually referred to Dr. Avilez for further evaluation and in March 2021 patient was seen by Dr. Avilez and underwent an exam and biopsy in April that revealed evidence of anal squamous cell carcinoma. Patient was subsequently referred for evaluation and treatment. Patient did not have full excision of the tumor rather a excisional biopsy.  Main reason for which patient is referred is pain which has been an issue for him for the last 3 months. The pain is localized to the anus/rectum. It is severe, not well controlled. Worse with defecation, sitting. At its worst the pain is 10/10. Rates it as 8/10 presently.   He is currently using Oxycodone IR 20mg PRN, takes this every 6 hours. He was previously tried on transdermal fentanyl 25mcg/hr and oxycontin but he states that neither helped his pain. He no longer uses either.  Has tried using anusol suppository without relief.   Underwent EUA by Dr. Avilez on 11/26/21 for evaluation of persistent kandy-anal pain and a 4 x 4 cm ulcer was found but no fissure or fistula. A biopsy was performed and was negative for dysplasia or malignancy.     He was admitted [facility] (3/17-4/2/2022) for acute on chronic rectal pain associated w/ hematochezia and constipation.  MRI c/f perianal abscess.  He went to the OR on 3/25; severe induration seen circumferentially with ulcerated mucosa and recessed cavity on left side. No definite abscess seen therefore no drainage or sphincterotomy performed. He is s/p diverting colostomy 3/30.   Interval History (1/3/24): Patient seen for follow up visit via telemedicine. He is s/p abdominoperitoneal resection with end colostomy (August 2022) with ongoing post-surgical pain.  His pain is localized to the mid abdomen, at the incision line from his laparotomy. The pain is sharp, knife-like. The pain is constant, although worsens with laying on his side. Pain reaches 8/10, PRN analgesics bring the pain level down to 4/10.  He remains on MS ER TID to 15/15/30mg and PRN Oxycodone IR 10mg Q4h, he uses a dose twice a day. He reports good relief however the duration is short lived. He supplements with PRN Tylenol 1000mg, he does not find this to be effective.  The penile pain has decreased significantly, he reports for a few days he experienced no pain at all. He continues with numbness to the area.  He is scheduled for a neurologist evaluation to evaluate numbness and penile pain this afternoon.  He has been having intermittent urinary incontinence, wears Depends. He feels this has improved. He was following with his urologist Dr. Mathews in [place], who is not affiliated with [facility].  He is s/p a stent exchange with his [facility] Urologist Dr. Mathew.  Sleep disturbances 2/2 pain, he requires a dose of PRN analgesia overnight several times a week. Uses Zyprexa 10mg QHS and Ambien QHS. Anxiety managed with PRN alprazolam 1mg, sparingly.    ROS: + appetite is poor; improved + anxiety d/o - uses alprazolam 1mg PRN, olanzapine 10mg QHS + insomnia - is on long-standing ambien 10mg QHS  +constipation - uses lactulose daily, stools have been loose +b/l pedal edema - was started on furosemide 20mg by PCP, has taken for about 3 days, did not notice any improvement +BP has been high due to his uncontrolled pain +hematuria - ongoing since b/l stent placement Denies nausea/vomiting, diarrhea. All other ROS as outlined or noncontributory.   Patient is , lives with his wife.  He is not presently driving, uses a car service to get to medical appointments.  Quit smoking tobacco about two years ago. No EtOH.  Passes time by watching TV.   PCP: Dr. Jarad Schwartz Psychiatrist: Dr. Eric Yin ([facility]) Urologist: Dr. Mathew ([facility]) Dr. Manuel Mathews ([phone])  I-Stop Ref#: [id number]

## 2024-01-08 NOTE — ED ADULT NURSE NOTE - FINAL NURSING ELECTRONIC SIGNATURE
"[facility] - Med Surg  Adult Nutrition  Progress Note    SUMMARY       Recommendations  Recommendation:   1. Continue Diabetic/cardiac diet   2. Continue Boost Glucose Control TID    3. Monitor need for additional protein  4. Monitor weight and labs    Goals: Pt will consume 50-75% of meals by RD follow up    Nutrition Goal Status: new  Communication of RD Recs: other (comment) (POC)    Assessment and Plan  Nutrition Problem  Increased protein needs    Related to (etiology):   Wound healing    Signs and Symptoms (as evidenced by):   Right heel DM foot ulcer, left leg incision     Interventions:  Collaboration with other providers   Everyone Counts beverage    Nutrition Diagnosis Status:   Continues      Malnutrition Assessment  Weight Loss (Malnutrition):  (Noted 4% weight loss since 10/31)     Reason for Assessment  Reason For Assessment: RD follow-up  Diagnosis: other (see comments) (Staphylococcus aureus bacteremia)  Relevant Medical History: HTN, DM2, May-thurner syndrome  Interdisciplinary Rounds: did not attend  General Information Comments: Pt is currently on a diabetic diet 2000 kcals w/ Boost Glucose Control TID. Noted 0% meal intake per chart review. Ashish- 15/ incision left leg, right heel DM foot ulcer. Per chart review pt had N/V and poor PO intake 1 week ago. Unable to conduct NFPE at this time due to pt doing on isolation. Noted weight gain of 9 lbs since 2/7.  Nutrition Discharge Planning: d/c on diabetic/cardiac diet    Nutrition Risk Screen  Nutrition Risk Screen: no indicators present    Nutrition/Diet History  Patient Reported Diet/Restrictions/Preferences: diabetic diet, heart healthy  Spiritual, Cultural Beliefs, Mandaeism Practices, Values that Affect Care: no  Factors Affecting Nutritional Intake: nausea/vomiting, NPO, constipation    Anthropometrics  Temp: 98.1 °F (36.7 °C)  Height: 6' 1" (185.4 cm)  Height (inches): 73 in  Weight Method: Bed Scale  Weight: 115.1 kg (253 lb 12 oz)  Weight (lb): " 253.75 lb  Ideal Body Weight (IBW), Male: 184 lb  % Ideal Body Weight, Male (lb): 137.91 %  BMI (Calculated): 33.5  BMI Grade: 30 - 34.9- obesity - grade I  Usual Body Weight (UBW), k kg (2/7)  % Usual Body Weight: 103.91  % Weight Change From Usual Weight: 3.69 %     Lab/Procedures/Meds  Pertinent Labs Reviewed: reviewed  Pertinent Labs Comments: hgb 9.2L, hct 28.6L, na 125L, BG 134H  Pertinent Medications Reviewed: reviewed  Pertinent Medications Comments: insulin, pantaprazole    Estimated/Assessed Needs  Weight Used For Calorie Calculations: 83.6 kg (184 lb 4.9 oz) (IBW)  Energy Calorie Requirements (kcal): 2508 kcals (30 kcals/kg IBW)  Energy Need Method: Kcal/kg  Protein Requirements: 100g (1.2 g/kg IBW)  Weight Used For Protein Calculations: 83.6 kg (184 lb 4.9 oz) (IBW)     Estimated Fluid Requirement Method: RDA Method  RDA Method (mL): 2508  CHO Requirement: 300g    Nutrition Prescription Ordered  Current Diet Order: Diabetic diet  Oral Nutrition Supplement: Boost Glucose Control TID    Evaluation of Received Nutrient/Fluid Intake  I/O: 424/1650  Energy Calories Required: not meeting needs  Protein Required: not meeting needs  Fluid Required: not meeting needs  Comments: LBM- 1/8  Tolerance: not tolerating  % Intake of Estimated Energy Needs: 0 - 25 %  % Meal Intake: 0 - 25 %    Nutrition Risk  Level of Risk/Frequency of Follow-up: moderate - high (2x/weekly)     Monitor and Evaluation  Food and Nutrient Intake: energy intake, food and beverage intake  Food and Nutrient Adminstration: diet order  Anthropometric Measurements: weight, weight change, body mass index  Biochemical Data, Medical Tests and Procedures: gastrointestinal profile, electrolyte and renal panel, glucose/endocrine profile, lipid profile     Nutrition Follow-Up  RD Follow-up?: Yes       26-Jan-2023 21:32

## 2024-01-09 ENCOUNTER — APPOINTMENT (OUTPATIENT)
Dept: NEUROLOGY | Facility: CLINIC | Age: 59
End: 2024-01-09
Payer: MEDICAID

## 2024-01-09 ENCOUNTER — LABORATORY RESULT (OUTPATIENT)
Age: 59
End: 2024-01-09

## 2024-01-09 VITALS
HEART RATE: 82 BPM | HEIGHT: 71 IN | DIASTOLIC BLOOD PRESSURE: 102 MMHG | TEMPERATURE: 97.7 F | OXYGEN SATURATION: 99 % | SYSTOLIC BLOOD PRESSURE: 143 MMHG | BODY MASS INDEX: 29.96 KG/M2 | WEIGHT: 214 LBS

## 2024-01-09 DIAGNOSIS — N48.89 OTHER SPECIFIED DISORDERS OF PENIS: ICD-10-CM

## 2024-01-09 DIAGNOSIS — R20.0 ANESTHESIA OF SKIN: ICD-10-CM

## 2024-01-09 DIAGNOSIS — R10.0 ACUTE ABDOMEN: ICD-10-CM

## 2024-01-09 PROCEDURE — 99205 OFFICE O/P NEW HI 60 MIN: CPT

## 2024-01-09 NOTE — DATA REVIEWED
[de-identified] : pain management noted urology pain noted HbaC 5.8 HIV VL non detectable, ABSO CD4 579 cells/uL  EXAM: [id number] - MR SPINE LUMBAR WAW IC  - ORDERED BY: DONELL GANDHI   PROCEDURE DATE:  09/17/2023    INTERPRETATION:  CLINICAL INFORMATION: Chronic back pain. Renal cancer now with penile numbness and back pain.  ADDITIONAL CLINICAL INFORMATION: Cancer C80.1  TECHNIQUE: Multiplanar, multisequence MRI was performed of the lumbar spine. IV Contrast: Gadavist  9.5 cc administered   0.5 cc discarded  PRIOR STUDIES: MRI of the lumbar spine from March 21, 2022.  FINDINGS:  LOCALIZER: No additional findings. BONES: Hyperintense T1 marrow signal seen with L5 and the imaged upper sacrum consistent with the sequela prior radiation therapy. There is no focal osseous lesion or abnormal osseous enhancement. There is multilevel chronic Schmorl's node formation. ALIGNMENT: There is slight left curvature of the lumbar spine. There is trace retrolisthesis of L2 on L3, L3 on L4, and L5 on S1 which is unchanged. SACROILIAC JOINTS/SACRUM: There is no sacral fracture. The SI joints are partially visualized but are intact. CONUS AND CAUDA EQUINA: The distal cord and conus are normal in signal. Conus terminates at L1. There is no abnormal intramedullary or leptomeningeal enhancement. VISUALIZED INTRAPELVIC/INTRA-ABDOMINAL SOFT TISSUES: Postsurgical changes are seen within the lower pelvis. There is thickening of the urinary bladder. There is bilateral hydronephrosis. There are bilateral renal cysts. PARASPINAL SOFT TISSUES: Normal.   INDIVIDUAL LEVELS:  Spinal canal is narrowed on a congenital basis.  LOWER THORACIC SPINE: No spinal canal or neuroforaminal stenosis.  L1-L2: No spinal canal or neuroforaminal stenosis. L2-L3: There is bilateral facet arthrosis. There is minimal disc bulge. There is minimal bilateral neural foraminal narrowing. There is no spinal canal narrowing. Findings are unchanged. L3-L4: There is minimal disc bulge. There is mild bilateral facet arthrosis. There is mild bilateral neural foraminal narrowing. There is slight flattening of the ventral thecal sac. Findings are grossly unchanged. L4-L5: There is prominence of the posterior epidural fat. There is a diffuse disc bulge. There is advanced bilateral facet arthrosis and ligamentum flavum hypertrophy. Findings result in marked effacement of the thecal sac which is grossly similar compared to the prior examination. There is moderate right and mild left neural foraminal narrowing. Findings are grossly unchanged. L5-S1: There is a mild diffuse disc bulge posterior osseous ridging. There is bilateral facet arthrosis. There is moderate left and mild right neural foraminal narrowing. There is no spinal canal narrowing. Findings are grossly unchanged.   IMPRESSION:  Grossly unchanged multilevel lumbar spondylosis superimposed on congenital spinal canal narrowing. Findings are again most prominent at the level of L4-L5 where there is unchanged marked effacement of the thecal sac and bilateral neural foraminal narrowing, moderate on the right and mild on the left. No focal osseous lesion. No abnormal intramedullary or leptomeningeal enhancement.  --- End of Report ---       KERI CHILEL MD; Attending Radiologist This document has been electronically signed. Sep 20 2023 12:16PM

## 2024-01-09 NOTE — CONSULT LETTER
[Dear  ___] : Dear  [unfilled], [Consult Letter:] : I had the pleasure of evaluating your patient, [unfilled]. [Please see my note below.] : Please see my note below. [Consult Closing:] : Thank you very much for allowing me to participate in the care of this patient.  If you have any questions, please do not hesitate to contact me. [Sincerely,] : Sincerely, [FreeTextEntry3] : Kenia Turner MD, MPH

## 2024-01-09 NOTE — HISTORY OF PRESENT ILLNESS
[FreeTextEntry1] : 58 m with HTN, HIV diagnosed 1992 on Biktarvy, prostate ca 2016 s/p radiation, anal cancer (dx 4/2021) s/p radiation and chemotherapy (last 8/2021) complicated by an ulcer, abscess and rectal bleedings/p loop colostomy 3/30/22 and then s/p APR with end colostomy creation, VRAM flap closure, 8/24 found to have a large abscess as well, abscess cx with clostridium, bacteroides again was admitted for GIB and pelvic/groin abscess s/p drain placement and CT also showed L ischial tuberosity osteo so pt received zosyn in the hospital and was discharged with PO levo and flagyl patient is referred here for penile numbness.  The patient denies any numbness in his testicle or penis.  He defecated pain chest testicles and penis.  He is unable to describe.  The pain is not present when he does not pass his genital area.  He denies back pain.  No weakness in the leg numbness and tingling in the legs.  No radicular pain from the back to the legs, no groins.  The patient complains of abdominal pain is incisional site.

## 2024-01-09 NOTE — PHYSICAL EXAM
[General Appearance - Alert] : alert [General Appearance - In No Acute Distress] : in no acute distress [Person] : oriented to person [Place] : oriented to place [Time] : oriented to time [Concentration Intact] : normal concentrating ability [Naming Objects] : no difficulty naming common objects [Fluency] : fluency intact [Comprehension] : comprehension intact [Vocabulary] : adequate range of vocabulary [Cranial Nerves Optic (II)] : visual acuity intact bilaterally,  visual fields full to confrontation, pupils equal round and reactive to light [Cranial Nerves Oculomotor (III)] : extraocular motion intact [Cranial Nerves Trigeminal (V)] : facial sensation intact symmetrically [Cranial Nerves Facial (VII)] : face symmetrical [Cranial Nerves Vestibulocochlear (VIII)] : hearing was intact bilaterally [Motor Tone] : muscle tone was normal in all four extremities [Motor Strength] : muscle strength was normal in all four extremities [Involuntary Movements] : no involuntary movements were seen [Sensation Tactile Decrease] : light touch was intact [Sensation Pain / Temperature Decrease] : pain and temperature was intact [Sensation Vibration Decrease] : vibration was intact [Proprioception] : proprioception was intact [Abnormal Walk] : normal gait [Balance] : balance was intact [1+] : Ankle jerk left 1+ [Coordination - Dysmetria Impaired Finger-to-Nose Bilateral] : not present [Coordination - Dysmetria Impaired Heel-to-Shin Bilateral] : not present [Plantar Reflex Right Only] : normal on the right [Plantar Reflex Left Only] : normal on the left

## 2024-01-09 NOTE — ASSESSMENT
[FreeTextEntry1] : The patient is here for evaluation of penile pain as well as testicular pain as well as penile numbness.  He denies back pain.  Patient's symptoms are likely due to neurological etiology.  Will however obtain an MRI of the lumbosacral plexus to evaluate for any abnormalities that may be affecting that area.  I am unable to do a nerve conduction EMG to evaluate for neuropathies affecting the genital area.  MRI of the lumbar spine shows s multilevel chronic Schmorl's node formation. here is trace retrolisthesis of L2 on L3, L3 on L4, and L5 on S1 which is unchanged. L2-L3: There is bilateral facet arthrosis. There is minimal disc bulge. There is minimal bilateral neural foraminal narrowing. There is no spinal canal narrowing. Findings are unchanged. L3-L4: There is minimal disc bulge. There is mild bilateral facet arthrosis. There is mild bilateral neural foraminal narrowing. There is slight flattening of the ventral thecal sac. Findings are grossly unchanged. L4-L5: There is prominence of the posterior epidural fat. There is a diffuse disc bulge. There is advanced bilateral facet arthrosis and ligamentum flavum hypertrophy. Findings result in marked effacement of the thecal sac which is grossly similar compared to the prior examination. There is moderate right and mild left neural foraminal narrowing. Findings are grossly unchanged. L5-S1: There is a mild diffuse disc bulge posterior osseous ridging. There is bilateral facet arthrosis. There is moderate left and mild right neural foraminal narrowing. There is no spinal canal narrowing. Findings are grossly unchanged.  Advised patient to follow-up with his surgeon or pain management for pain at the surgical area of the abdomen. Patient appears sedated to his wife, Likely secondary to opiates.  I spent the time noted on the day of this patient encounter preparing for, providing and documenting the above E/M service and counseling and educate patient on differential, workup, disease course, and treatment/management. Education was provided to the patient during this encounter. All questions and concerns were answered and addressed in detail. The patient verbalized understanding and agreed to plan. Patient was advised to continue to monitor for neurologic symptoms and to notify my office or go to the nearest emergency room if there are any changes. Any orders/referrals and communications were provided as well.   Side effects of the above medications were discussed in detail including but not limited to applicable black box warning and teratogenicity as appropriate.  For patients with seizures, I discussed risk of SUDEP with patient and advised that SUDEP risk can be minimized with good seizure control through medication compliance and other measures. Patient was advised to bring previous records to my office, including CD of imaging, when applicable. 03-Aug-2020 19:12

## 2024-01-10 ENCOUNTER — APPOINTMENT (OUTPATIENT)
Dept: DERMATOLOGY | Facility: CLINIC | Age: 59
End: 2024-01-10
Payer: MEDICAID

## 2024-01-10 DIAGNOSIS — B35.3 TINEA PEDIS: ICD-10-CM

## 2024-01-10 DIAGNOSIS — D48.5 NEOPLASM OF UNCERTAIN BEHAVIOR OF SKIN: ICD-10-CM

## 2024-01-10 DIAGNOSIS — L60.3 NAIL DYSTROPHY: ICD-10-CM

## 2024-01-10 DIAGNOSIS — L30.9 DERMATITIS, UNSPECIFIED: ICD-10-CM

## 2024-01-10 LAB
ANION GAP SERPL CALC-SCNC: 17 MMOL/L
BUN SERPL-MCNC: 17 MG/DL
CALCIUM SERPL-MCNC: 9.6 MG/DL
CHLORIDE SERPL-SCNC: 102 MMOL/L
CO2 SERPL-SCNC: 24 MMOL/L
CREAT SERPL-MCNC: 1.16 MG/DL
EGFR: 73 ML/MIN/1.73M2
GLUCOSE SERPL-MCNC: 92 MG/DL
POTASSIUM SERPL-SCNC: 4 MMOL/L
SODIUM SERPL-SCNC: 143 MMOL/L

## 2024-01-10 PROCEDURE — 11104 PUNCH BX SKIN SINGLE LESION: CPT

## 2024-01-10 PROCEDURE — 99213 OFFICE O/P EST LOW 20 MIN: CPT | Mod: 25

## 2024-01-10 RX ORDER — TRIAMCINOLONE ACETONIDE 1 MG/G
0.1 OINTMENT TOPICAL
Qty: 1 | Refills: 2 | Status: ACTIVE | COMMUNITY
Start: 2024-01-10 | End: 1900-01-01

## 2024-01-11 PROBLEM — L60.3 ONYCHODYSTROPHY: Status: ACTIVE | Noted: 2023-12-06

## 2024-01-11 PROBLEM — B35.3 TINEA PEDIS OF BOTH FEET: Status: ACTIVE | Noted: 2023-12-05

## 2024-01-11 PROBLEM — D48.5 NEOPLASM OF UNCERTAIN BEHAVIOR OF SKIN: Status: ACTIVE | Noted: 2024-01-11

## 2024-01-11 NOTE — CONSULT LETTER
[Dear  ___] : Dear  [unfilled], [Consult Letter:] : I had the pleasure of evaluating your patient, [unfilled]. [Please see my note below.] : Please see my note below. [Consult Closing:] : Thank you very much for allowing me to participate in the care of this patient.  If you have any questions, please do not hesitate to contact me. [Sincerely,] : Sincerely, [FreeTextEntry3] : Leana Theodore MD Department of Dermatology [place] and [facility] at [facility]

## 2024-01-11 NOTE — ASSESSMENT
[FreeTextEntry1] : #Rash, b/l feet no longer scaly s/p ketoconazole cream favor tinea pedis, erick given tinea pedis on R 4-5th toe webspace and likely onychomycosis of multiple toenails ddx includes PPD vs granulomatous vs eczematous  - switch to terbinafine cream to AA BID fungal cx 12/5/23 neg  RTC 4 weeks  #dermatitis, trunk, R arm ddx eczematous vs granulomatous vs other bx today as below Biopsy by  [facility].  The risks/benefits/alternatives of skin biopsy were explained to the patient, which include and are not limited to bleeding, infection, scarring or discoloration of skin, and recurrence of lesion. Patient expressed understanding of these risks and provided consent to the procedure. Time out with verification of patient and lesion site was performed. Site was prepped with rubbing alcohol, lidocaine with epinephrine was injected for anesthesia, and biopsy was performed. Specimen sent to path. Procedure was without complication and well tolerated. Wound care was discussed.  #onychodystrophy favor onychomycosis discussed nature and course of condition, including treatment options risks/benefits/alternatives involved (PO terbinafine vs topical antifungals which have low efficacy) removal/treatment deferred at this time reassurance provided

## 2024-01-11 NOTE — HISTORY OF PRESENT ILLNESS
[FreeTextEntry1] : fp rash [de-identified] : Referred by: Dr. Schwartz   Mr. CARL ROWLEY  is a 58 year old M w/ h/o anal ca s/p RT 2021, prostate cancer s/p RT in 2016 (on Lupron), HIV (VL UD,CD4 579 12/2023) on Biktarvy. here for evaluation of below  #asx rash on feet - deny improvement with ketoconazole cream also asking about rash on rest of body , present for a few yrs, asx. thinks he got a new one on R arm hx: - present about 1 yr - untreated   no personal or family h/o skin cancer

## 2024-01-11 NOTE — PHYSICAL EXAM
[Alert] : alert [Oriented x 3] : ~L oriented x 3 [Well Nourished] : well nourished [Conjunctiva Non-injected] : conjunctiva non-injected [No Visual Lymphadenopathy] : no visual  lymphadenopathy [No Clubbing] : no clubbing [No Edema] : no edema [No Bromhidrosis] : no bromhidrosis [No Chromhidrosis] : no chromhidrosis [FreeTextEntry3] : few scattered smooth erythematous thin plaques on trunk, R arm multiple smooth pink annular and round thin patches on b/l dorsal feet b/l 4-5th toeweb spaces with some maceration multiple toenails (erick the b/l 1st toenails) with hyperkeratosis and subungual debris, green discoloration of R 1st toenail

## 2024-01-12 ENCOUNTER — NON-APPOINTMENT (OUTPATIENT)
Age: 59
End: 2024-01-12

## 2024-01-14 ENCOUNTER — EMERGENCY (EMERGENCY)
Facility: HOSPITAL | Age: 59
LOS: 0 days | Discharge: HOME | End: 2024-01-14
Attending: STUDENT IN AN ORGANIZED HEALTH CARE EDUCATION/TRAINING PROGRAM
Payer: MEDICAID

## 2024-01-14 VITALS
HEART RATE: 108 BPM | RESPIRATION RATE: 18 BRPM | OXYGEN SATURATION: 95 % | SYSTOLIC BLOOD PRESSURE: 122 MMHG | DIASTOLIC BLOOD PRESSURE: 89 MMHG | WEIGHT: 214.07 LBS | TEMPERATURE: 99 F | HEIGHT: 71 IN

## 2024-01-14 VITALS
SYSTOLIC BLOOD PRESSURE: 134 MMHG | TEMPERATURE: 99 F | DIASTOLIC BLOOD PRESSURE: 98 MMHG | HEART RATE: 88 BPM | RESPIRATION RATE: 18 BRPM | OXYGEN SATURATION: 98 %

## 2024-01-14 DIAGNOSIS — Z92.3 PERSONAL HISTORY OF IRRADIATION: ICD-10-CM

## 2024-01-14 DIAGNOSIS — Z85.048 PERSONAL HISTORY OF OTHER MALIGNANT NEOPLASM OF RECTUM, RECTOSIGMOID JUNCTION, AND ANUS: ICD-10-CM

## 2024-01-14 DIAGNOSIS — N43.3 HYDROCELE, UNSPECIFIED: ICD-10-CM

## 2024-01-14 DIAGNOSIS — N39.0 URINARY TRACT INFECTION, SITE NOT SPECIFIED: ICD-10-CM

## 2024-01-14 DIAGNOSIS — N48.89 OTHER SPECIFIED DISORDERS OF PENIS: ICD-10-CM

## 2024-01-14 DIAGNOSIS — R10.9 UNSPECIFIED ABDOMINAL PAIN: ICD-10-CM

## 2024-01-14 DIAGNOSIS — Z93.3 COLOSTOMY STATUS: ICD-10-CM

## 2024-01-14 DIAGNOSIS — Z20.822 CONTACT WITH AND (SUSPECTED) EXPOSURE TO COVID-19: ICD-10-CM

## 2024-01-14 DIAGNOSIS — Z98.890 OTHER SPECIFIED POSTPROCEDURAL STATES: ICD-10-CM

## 2024-01-14 DIAGNOSIS — R50.9 FEVER, UNSPECIFIED: ICD-10-CM

## 2024-01-14 DIAGNOSIS — E78.5 HYPERLIPIDEMIA, UNSPECIFIED: ICD-10-CM

## 2024-01-14 DIAGNOSIS — Z93.3 COLOSTOMY STATUS: Chronic | ICD-10-CM

## 2024-01-14 DIAGNOSIS — I10 ESSENTIAL (PRIMARY) HYPERTENSION: ICD-10-CM

## 2024-01-14 DIAGNOSIS — N50.3 CYST OF EPIDIDYMIS: ICD-10-CM

## 2024-01-14 DIAGNOSIS — N13.30 UNSPECIFIED HYDRONEPHROSIS: ICD-10-CM

## 2024-01-14 DIAGNOSIS — Z96.0 PRESENCE OF UROGENITAL IMPLANTS: Chronic | ICD-10-CM

## 2024-01-14 DIAGNOSIS — K62.9 DISEASE OF ANUS AND RECTUM, UNSPECIFIED: Chronic | ICD-10-CM

## 2024-01-14 DIAGNOSIS — Z87.448 PERSONAL HISTORY OF OTHER DISEASES OF URINARY SYSTEM: ICD-10-CM

## 2024-01-14 DIAGNOSIS — Z85.46 PERSONAL HISTORY OF MALIGNANT NEOPLASM OF PROSTATE: ICD-10-CM

## 2024-01-14 DIAGNOSIS — E11.9 TYPE 2 DIABETES MELLITUS WITHOUT COMPLICATIONS: ICD-10-CM

## 2024-01-14 DIAGNOSIS — Z98.890 OTHER SPECIFIED POSTPROCEDURAL STATES: Chronic | ICD-10-CM

## 2024-01-14 DIAGNOSIS — Z21 ASYMPTOMATIC HUMAN IMMUNODEFICIENCY VIRUS [HIV] INFECTION STATUS: ICD-10-CM

## 2024-01-14 DIAGNOSIS — Z96.0 PRESENCE OF UROGENITAL IMPLANTS: ICD-10-CM

## 2024-01-14 LAB
ALBUMIN SERPL ELPH-MCNC: 3.3 G/DL — SIGNIFICANT CHANGE UP (ref 3.3–5)
ALBUMIN SERPL ELPH-MCNC: 3.3 G/DL — SIGNIFICANT CHANGE UP (ref 3.3–5)
ALP SERPL-CCNC: 87 U/L — SIGNIFICANT CHANGE UP (ref 40–120)
ALP SERPL-CCNC: 87 U/L — SIGNIFICANT CHANGE UP (ref 40–120)
ALT FLD-CCNC: 18 U/L — SIGNIFICANT CHANGE UP (ref 12–78)
ALT FLD-CCNC: 18 U/L — SIGNIFICANT CHANGE UP (ref 12–78)
ANION GAP SERPL CALC-SCNC: 2 MMOL/L — LOW (ref 5–17)
ANION GAP SERPL CALC-SCNC: 2 MMOL/L — LOW (ref 5–17)
APPEARANCE UR: ABNORMAL
APPEARANCE UR: ABNORMAL
APTT BLD: 30.5 SEC — SIGNIFICANT CHANGE UP (ref 24.5–35.6)
APTT BLD: 30.5 SEC — SIGNIFICANT CHANGE UP (ref 24.5–35.6)
AST SERPL-CCNC: 18 U/L — SIGNIFICANT CHANGE UP (ref 15–37)
AST SERPL-CCNC: 18 U/L — SIGNIFICANT CHANGE UP (ref 15–37)
BASOPHILS # BLD AUTO: 0.03 K/UL — SIGNIFICANT CHANGE UP (ref 0–0.2)
BASOPHILS # BLD AUTO: 0.03 K/UL — SIGNIFICANT CHANGE UP (ref 0–0.2)
BASOPHILS NFR BLD AUTO: 0.4 % — SIGNIFICANT CHANGE UP (ref 0–2)
BASOPHILS NFR BLD AUTO: 0.4 % — SIGNIFICANT CHANGE UP (ref 0–2)
BILIRUB SERPL-MCNC: 0.2 MG/DL — SIGNIFICANT CHANGE UP (ref 0.2–1.2)
BILIRUB SERPL-MCNC: 0.2 MG/DL — SIGNIFICANT CHANGE UP (ref 0.2–1.2)
BILIRUB UR-MCNC: NEGATIVE — SIGNIFICANT CHANGE UP
BILIRUB UR-MCNC: NEGATIVE — SIGNIFICANT CHANGE UP
BLD GP AB SCN SERPL QL: SIGNIFICANT CHANGE UP
BLD GP AB SCN SERPL QL: SIGNIFICANT CHANGE UP
BUN SERPL-MCNC: 13 MG/DL — SIGNIFICANT CHANGE UP (ref 7–23)
BUN SERPL-MCNC: 13 MG/DL — SIGNIFICANT CHANGE UP (ref 7–23)
CALCIUM SERPL-MCNC: 9.3 MG/DL — SIGNIFICANT CHANGE UP (ref 8.5–10.1)
CALCIUM SERPL-MCNC: 9.3 MG/DL — SIGNIFICANT CHANGE UP (ref 8.5–10.1)
CHLORIDE SERPL-SCNC: 109 MMOL/L — HIGH (ref 96–108)
CHLORIDE SERPL-SCNC: 109 MMOL/L — HIGH (ref 96–108)
CO2 SERPL-SCNC: 32 MMOL/L — HIGH (ref 22–31)
CO2 SERPL-SCNC: 32 MMOL/L — HIGH (ref 22–31)
COLOR SPEC: YELLOW — SIGNIFICANT CHANGE UP
COLOR SPEC: YELLOW — SIGNIFICANT CHANGE UP
CREAT SERPL-MCNC: 1.29 MG/DL — SIGNIFICANT CHANGE UP (ref 0.5–1.3)
CREAT SERPL-MCNC: 1.29 MG/DL — SIGNIFICANT CHANGE UP (ref 0.5–1.3)
DIFF PNL FLD: ABNORMAL
DIFF PNL FLD: ABNORMAL
EGFR: 65 ML/MIN/1.73M2 — SIGNIFICANT CHANGE UP
EGFR: 65 ML/MIN/1.73M2 — SIGNIFICANT CHANGE UP
EOSINOPHIL # BLD AUTO: 0.01 K/UL — SIGNIFICANT CHANGE UP (ref 0–0.5)
EOSINOPHIL # BLD AUTO: 0.01 K/UL — SIGNIFICANT CHANGE UP (ref 0–0.5)
EOSINOPHIL NFR BLD AUTO: 0.1 % — SIGNIFICANT CHANGE UP (ref 0–6)
EOSINOPHIL NFR BLD AUTO: 0.1 % — SIGNIFICANT CHANGE UP (ref 0–6)
FLUAV AG NPH QL: SIGNIFICANT CHANGE UP
FLUAV AG NPH QL: SIGNIFICANT CHANGE UP
FLUBV AG NPH QL: SIGNIFICANT CHANGE UP
FLUBV AG NPH QL: SIGNIFICANT CHANGE UP
GLUCOSE SERPL-MCNC: 114 MG/DL — HIGH (ref 70–99)
GLUCOSE SERPL-MCNC: 114 MG/DL — HIGH (ref 70–99)
GLUCOSE UR QL: NEGATIVE MG/DL — SIGNIFICANT CHANGE UP
GLUCOSE UR QL: NEGATIVE MG/DL — SIGNIFICANT CHANGE UP
HCT VFR BLD CALC: 36.6 % — LOW (ref 39–50)
HCT VFR BLD CALC: 36.6 % — LOW (ref 39–50)
HGB BLD-MCNC: 11.6 G/DL — LOW (ref 13–17)
HGB BLD-MCNC: 11.6 G/DL — LOW (ref 13–17)
IMM GRANULOCYTES NFR BLD AUTO: 0.7 % — SIGNIFICANT CHANGE UP (ref 0–0.9)
IMM GRANULOCYTES NFR BLD AUTO: 0.7 % — SIGNIFICANT CHANGE UP (ref 0–0.9)
INR BLD: 1.01 RATIO — SIGNIFICANT CHANGE UP (ref 0.85–1.18)
INR BLD: 1.01 RATIO — SIGNIFICANT CHANGE UP (ref 0.85–1.18)
KETONES UR-MCNC: ABNORMAL MG/DL
KETONES UR-MCNC: ABNORMAL MG/DL
LACTATE SERPL-SCNC: 1.6 MMOL/L — SIGNIFICANT CHANGE UP (ref 0.7–2)
LACTATE SERPL-SCNC: 1.6 MMOL/L — SIGNIFICANT CHANGE UP (ref 0.7–2)
LEUKOCYTE ESTERASE UR-ACNC: ABNORMAL
LEUKOCYTE ESTERASE UR-ACNC: ABNORMAL
LYMPHOCYTES # BLD AUTO: 2.01 K/UL — SIGNIFICANT CHANGE UP (ref 1–3.3)
LYMPHOCYTES # BLD AUTO: 2.01 K/UL — SIGNIFICANT CHANGE UP (ref 1–3.3)
LYMPHOCYTES # BLD AUTO: 27.8 % — SIGNIFICANT CHANGE UP (ref 13–44)
LYMPHOCYTES # BLD AUTO: 27.8 % — SIGNIFICANT CHANGE UP (ref 13–44)
MCHC RBC-ENTMCNC: 28.4 PG — SIGNIFICANT CHANGE UP (ref 27–34)
MCHC RBC-ENTMCNC: 28.4 PG — SIGNIFICANT CHANGE UP (ref 27–34)
MCHC RBC-ENTMCNC: 31.7 G/DL — LOW (ref 32–36)
MCHC RBC-ENTMCNC: 31.7 G/DL — LOW (ref 32–36)
MCV RBC AUTO: 89.5 FL — SIGNIFICANT CHANGE UP (ref 80–100)
MCV RBC AUTO: 89.5 FL — SIGNIFICANT CHANGE UP (ref 80–100)
MONOCYTES # BLD AUTO: 1.04 K/UL — HIGH (ref 0–0.9)
MONOCYTES # BLD AUTO: 1.04 K/UL — HIGH (ref 0–0.9)
MONOCYTES NFR BLD AUTO: 14.4 % — HIGH (ref 2–14)
MONOCYTES NFR BLD AUTO: 14.4 % — HIGH (ref 2–14)
NEUTROPHILS # BLD AUTO: 4.1 K/UL — SIGNIFICANT CHANGE UP (ref 1.8–7.4)
NEUTROPHILS # BLD AUTO: 4.1 K/UL — SIGNIFICANT CHANGE UP (ref 1.8–7.4)
NEUTROPHILS NFR BLD AUTO: 56.6 % — SIGNIFICANT CHANGE UP (ref 43–77)
NEUTROPHILS NFR BLD AUTO: 56.6 % — SIGNIFICANT CHANGE UP (ref 43–77)
NITRITE UR-MCNC: POSITIVE
NITRITE UR-MCNC: POSITIVE
NRBC # BLD: 0 /100 WBCS — SIGNIFICANT CHANGE UP (ref 0–0)
NRBC # BLD: 0 /100 WBCS — SIGNIFICANT CHANGE UP (ref 0–0)
PH UR: 6.5 — SIGNIFICANT CHANGE UP (ref 5–8)
PH UR: 6.5 — SIGNIFICANT CHANGE UP (ref 5–8)
PLATELET # BLD AUTO: 328 K/UL — SIGNIFICANT CHANGE UP (ref 150–400)
PLATELET # BLD AUTO: 328 K/UL — SIGNIFICANT CHANGE UP (ref 150–400)
POTASSIUM SERPL-MCNC: 4 MMOL/L — SIGNIFICANT CHANGE UP (ref 3.5–5.3)
POTASSIUM SERPL-MCNC: 4 MMOL/L — SIGNIFICANT CHANGE UP (ref 3.5–5.3)
POTASSIUM SERPL-SCNC: 4 MMOL/L — SIGNIFICANT CHANGE UP (ref 3.5–5.3)
POTASSIUM SERPL-SCNC: 4 MMOL/L — SIGNIFICANT CHANGE UP (ref 3.5–5.3)
PROT SERPL-MCNC: 8 GM/DL — SIGNIFICANT CHANGE UP (ref 6–8.3)
PROT SERPL-MCNC: 8 GM/DL — SIGNIFICANT CHANGE UP (ref 6–8.3)
PROT UR-MCNC: 300 MG/DL
PROT UR-MCNC: 300 MG/DL
PROTHROM AB SERPL-ACNC: 12 SEC — SIGNIFICANT CHANGE UP (ref 9.5–13)
PROTHROM AB SERPL-ACNC: 12 SEC — SIGNIFICANT CHANGE UP (ref 9.5–13)
RBC # BLD: 4.09 M/UL — LOW (ref 4.2–5.8)
RBC # BLD: 4.09 M/UL — LOW (ref 4.2–5.8)
RBC # FLD: 14 % — SIGNIFICANT CHANGE UP (ref 10.3–14.5)
RBC # FLD: 14 % — SIGNIFICANT CHANGE UP (ref 10.3–14.5)
SARS-COV-2 RNA SPEC QL NAA+PROBE: SIGNIFICANT CHANGE UP
SARS-COV-2 RNA SPEC QL NAA+PROBE: SIGNIFICANT CHANGE UP
SODIUM SERPL-SCNC: 143 MMOL/L — SIGNIFICANT CHANGE UP (ref 135–145)
SODIUM SERPL-SCNC: 143 MMOL/L — SIGNIFICANT CHANGE UP (ref 135–145)
SP GR SPEC: 1.01 — SIGNIFICANT CHANGE UP (ref 1–1.03)
SP GR SPEC: 1.01 — SIGNIFICANT CHANGE UP (ref 1–1.03)
UROBILINOGEN FLD QL: 0.2 MG/DL — SIGNIFICANT CHANGE UP (ref 0.2–1)
UROBILINOGEN FLD QL: 0.2 MG/DL — SIGNIFICANT CHANGE UP (ref 0.2–1)
WBC # BLD: 7.24 K/UL — SIGNIFICANT CHANGE UP (ref 3.8–10.5)
WBC # BLD: 7.24 K/UL — SIGNIFICANT CHANGE UP (ref 3.8–10.5)
WBC # FLD AUTO: 7.24 K/UL — SIGNIFICANT CHANGE UP (ref 3.8–10.5)
WBC # FLD AUTO: 7.24 K/UL — SIGNIFICANT CHANGE UP (ref 3.8–10.5)

## 2024-01-14 PROCEDURE — 93010 ELECTROCARDIOGRAM REPORT: CPT

## 2024-01-14 PROCEDURE — 99285 EMERGENCY DEPT VISIT HI MDM: CPT

## 2024-01-14 PROCEDURE — 74177 CT ABD & PELVIS W/CONTRAST: CPT | Mod: 26,MA

## 2024-01-14 RX ORDER — CEFTRIAXONE 500 MG/1
1000 INJECTION, POWDER, FOR SOLUTION INTRAMUSCULAR; INTRAVENOUS ONCE
Refills: 0 | Status: COMPLETED | OUTPATIENT
Start: 2024-01-14 | End: 2024-01-14

## 2024-01-14 RX ORDER — SODIUM CHLORIDE 9 MG/ML
1000 INJECTION INTRAMUSCULAR; INTRAVENOUS; SUBCUTANEOUS ONCE
Refills: 0 | Status: COMPLETED | OUTPATIENT
Start: 2024-01-14 | End: 2024-01-14

## 2024-01-14 RX ORDER — HYDROMORPHONE HYDROCHLORIDE 2 MG/ML
1 INJECTION INTRAMUSCULAR; INTRAVENOUS; SUBCUTANEOUS ONCE
Refills: 0 | Status: DISCONTINUED | OUTPATIENT
Start: 2024-01-14 | End: 2024-01-14

## 2024-01-14 RX ORDER — MORPHINE SULFATE 50 MG/1
4 CAPSULE, EXTENDED RELEASE ORAL ONCE
Refills: 0 | Status: DISCONTINUED | OUTPATIENT
Start: 2024-01-14 | End: 2024-01-14

## 2024-01-14 RX ORDER — CEFPODOXIME PROXETIL 100 MG
1 TABLET ORAL
Qty: 14 | Refills: 0
Start: 2024-01-14 | End: 2024-01-20

## 2024-01-14 RX ADMIN — HYDROMORPHONE HYDROCHLORIDE 1 MILLIGRAM(S): 2 INJECTION INTRAMUSCULAR; INTRAVENOUS; SUBCUTANEOUS at 13:54

## 2024-01-14 RX ADMIN — SODIUM CHLORIDE 1000 MILLILITER(S): 9 INJECTION INTRAMUSCULAR; INTRAVENOUS; SUBCUTANEOUS at 12:23

## 2024-01-14 RX ADMIN — CEFTRIAXONE 100 MILLIGRAM(S): 500 INJECTION, POWDER, FOR SOLUTION INTRAMUSCULAR; INTRAVENOUS at 11:22

## 2024-01-14 RX ADMIN — MORPHINE SULFATE 4 MILLIGRAM(S): 50 CAPSULE, EXTENDED RELEASE ORAL at 11:23

## 2024-01-14 RX ADMIN — MORPHINE SULFATE 4 MILLIGRAM(S): 50 CAPSULE, EXTENDED RELEASE ORAL at 12:23

## 2024-01-14 RX ADMIN — SODIUM CHLORIDE 1000 MILLILITER(S): 9 INJECTION INTRAMUSCULAR; INTRAVENOUS; SUBCUTANEOUS at 11:23

## 2024-01-14 RX ADMIN — CEFTRIAXONE 1000 MILLIGRAM(S): 500 INJECTION, POWDER, FOR SOLUTION INTRAMUSCULAR; INTRAVENOUS at 12:00

## 2024-01-14 NOTE — ED PROVIDER NOTE - ATTENDING CONTRIBUTION TO CARE
Patient is a 58 year-old-male with history of DM, HTN, HLD, HIV+, anal cancer s/p resection with colostomy bag c/b ureteral stricture with recent BL ureteral JJ stent placement presents with fever/abdominal/penile pain. Patient is a 58 year-old-male with history of DM, HTN, HLD, HIV+, anal cancer s/p resection with colostomy bag c/b ureteral stricture with recent BL ureteral JJ stent placement presents with fever/abdominal/penile pain. Patient states that his pain is chronic but that he has been febrile for the past several days.  Was advised to come to the ED by his urologist at the [facility]. general abd pain. no penile discharge or erythema. will check labs, urine, ct abd. ddx includes but is not limited to UTI, pyelo, issues with stents, post surg complication Patient is a 58 year-old-male with history of DM, HTN, HLD, HIV+, anal cancer s/p resection with colostomy bag c/b ureteral stricture with recent BL ureteral JJ stent placement presents with fever/abdominal/penile pain. Patient states that his pain is chronic but that he has been febrile for the past several days.  Was advised to come to the ED by his urologist at the [facility]. general abd pain. no penile discharge or erythema. will check labs, urine, ct abd. ddx includes but is not limited to UTI, pyelo, issues with stents, post surg complication Patient is a 58 year-old-male with history of DM, HTN, HLD, HIV+, anal cancer s/p resection with colostomy bag c/b ureteral stricture with recent BL ureteral JJ stent placement presents with fever/abdominal/penile pain. Patient states that his pain is chronic but that he has been febrile for the past several days.  Was advised to come to the ED by his urologist at the [facility]. general abd pain. no penile discharge or erythema. will check labs, urine, ct abd. ddx includes but is not limited to UTI, pyelo, issues with stents, post surg complication    Phyllis Nicholson MD  GENERAL: Patient awake alert NAD.  HEENT: NC/AT, Moist mucous membranes, EOMI.  LUNGS: CTAB, no wheezes or crackles.   CARDIAC: RRR, no m/r/g.    ABDOMEN: Soft, +generally tender, ND, No rebound, guarding. +mild bilat CVA tenderness.    no penile discharge or edema or erythema  EXT: No edema. No calf tenderness.   MSK: No spinal tenderness, no pain with movement, no deformities.  NEURO: A&Ox3. Moving all extremities.  SKIN: Warm and dry. No rash.  PSYCH: Normal affect. Patient is a 58 year-old-male with history of DM, HTN, HLD, HIV+, anal cancer s/p resection with colostomy bag c/b ureteral stricture with recent BL ureteral JJ stent placement presents with fever/abdominal/penile pain. Patient states that his pain is chronic but that he has been febrile for the past several days.  Was advised to come to the ED by his urologist at the [facility]. general abd pain. no penile discharge or erythema. will check labs, urine, ct abd. ddx includes but is not limited to UTI, pyelo, issues with stents, post surg complication    Phyllis Nicholson MD  GENERAL: Patient awake alert NAD.  HEENT: NC/AT, Moist mucous membranes, EOMI.  LUNGS: CTAB, no wheezes or crackles.   CARDIAC: RRR, no m/r/g.    ABDOMEN: Soft, +generally tender, ND, No rebound, guarding. +mild bilat CVA tenderness.    no penile discharge or edema or erythema  EXT: No edema. No calf tenderness.   MSK: No spinal tenderness, no pain with movement, no deformities.  NEURO: A&Ox3. Moving all extremities.  SKIN: Warm and dry. No rash.  PSYCH: Normal affect.

## 2024-01-14 NOTE — ED ADULT TRIAGE NOTE - CHIEF COMPLAINT QUOTE
pt c/o abdominal and penile pain x 1 year, s/p kidney metal stent placement (dec 2023), continue to complain of pain. pt took oxycodone at 8am with no relief. temp 103 at home last night. hx: UTI

## 2024-01-14 NOTE — ED PROVIDER NOTE - CARE PROVIDER_API CALL
Ravi Mathew  Urology  [address], [address]  [place] [address]  Phone: [phone]  Fax: [phone]  Follow Up Time:    Ravi Mathew  Urology  [address]  [place] [id number]  Phone: [phone]  Fax: [phone]  Follow Up Time:

## 2024-01-14 NOTE — ED PROVIDER NOTE - PHYSICAL EXAMINATION
Vitals: I have reviewed the patients vital signs  General: non toxic appearing   HEENT: Atraumatic, normocephalic, airway patent  Eyes: EOMI, tracking appropriately  Neck: no tracheal deviation, no JVD  Chest/Lungs: symmetric chest rise, speaking in complete sentences, no WOB  Heart: skin and extremities well perfused, regular rate and rhythm  Abdomen: soft, colostomy at bedside, lower abdominal tenderness, cloudy urine at bedside   Neuro: A+Ox3, ambulating without difficulty, CN grossly intact  MSK: strength at baseline in all extremities, no muscle wasting or atrophy  Skin: no cyanosis, no jaundice, no new emergent lesions

## 2024-01-14 NOTE — ED PROVIDER NOTE - CLINICAL SUMMARY MEDICAL DECISION MAKING FREE TEXT BOX
Patient is a 58 year-old-male with history of DM, HTN, HLD, HIV+, anal cancer s/p resection with colostomy bag c/b ureteral stricture with recent BL ureteral JJ stent placement presents with fever/abdominal/penile pain. Cloudy urine at bedside. Concerning for UTI. Workup includes labs, UA/UC, CTAP to evaluate for stent. Fluids, pain control and ceftriaxone for UTI coverage.

## 2024-01-14 NOTE — CONSULT NOTE ADULT - SUBJECTIVE AND OBJECTIVE BOX
Chief Complaint:  Patient is a 58y old  Male who presents with a chief complaint of fevers    HPI:     59 Y/O male with a PMHx of Anal ca, prostate ca s/p radiation therapy, HTN, HLD, with a PSHx of Suarez's, stent placement and most recent stent exchange with Dr. Mathew on 2023 presents to the ED for fevers. He states that he began having fevers over a week ago with the highest recorded as 103.1F. States that he took 5days of Augmentin and Tylenol to help control the fevers with no relief. Patient has a known history of chronic penile pain, burning, tingling, urinary incontinence with use of depends. He denies chills, N/V/D, chest pain, SOB, hematuria.       PMH/PSH:PAST MEDICAL & SURGICAL HISTORY:  HTN (hypertension)      HLD (hyperlipidemia)      HIV infection      Depressive disorder      Anxiety      Prostate cancer      Anal cancer      Diverticulosis      Lung cancer      Unspecified abdominal pain      Abdominal pain      BPH (benign prostatic hyperplasia)      Insomnia      Anal lesion      S/P colostomy      History of gastroscopy      Ureteral stent present          Allergies:  No Known Allergies      Medications:      Review of Systems:  Negative except for HPI    Relevant Family History:   FAMILY HISTORY:  FH: prostate cancer    FH: breast cancer        Relevant Social History: Alcohol ( -) , Tobacco ( -) , Illicit drugs (- )     Physical Exam:    Vital Signs:  Vital Signs Last 24 Hrs  T(C): 37.2 (2024 13:28), Max: 37.2 (2024 13:28)  T(F): 98.9 (2024 13:28), Max: 98.9 (2024 13:28)  HR: 88 (2024 13:28) (88 - 108)  BP: 134/98 (2024 13:28) (122/89 - 134/98)  BP(mean): --  RR: 18 (2024 13:28) (18 - 18)  SpO2: 98% (2024 13:28) (95% - 98%)    Parameters below as of 2024 13:28  Patient On (Oxygen Delivery Method): room air      Daily Height in cm: 180.34 (2024 09:45)    Daily     General:  Appears stated age, no distress  HEENT:  NC/AT,  conjunctivae clear and pink  Chest:  Full & symmetric excursion, no increased effort  Cardiovascular:  Regular rhythm, S1, S2  Abdomen:  Soft, non tender, non distended, ostomy pink, viable with gas in bag  : Uncircumcised phallus with TTP of bilateral testicles, no discharge from penis   Extremities:  no cyanosis, clubbing or edema  Skin:  No rash/erythema/ecchymoses/petechiae/wounds/abscess/warm/dry  Neuro/Psych:  Alert, oriented, no asterixis, no tremor, no encephalopathy    Laboratory:                          11.6   7.24  )-----------( 328      ( 2024 11:15 )             36.6     -14    143  |  109<H>  |  13  ----------------------------<  114<H>  4.0   |  32<H>  |  1.29    Ca    9.3      2024 11:15    TPro  8.0  /  Alb  3.3  /  TBili  0.2  /  DBili  x   /  AST  18  /  ALT  18  /  AlkPhos  87  -14    LIVER FUNCTIONS - ( 2024 11:15 )  Alb: 3.3 g/dL / Pro: 8.0 gm/dL / ALK PHOS: 87 U/L / ALT: 18 U/L / AST: 18 U/L / GGT: x           PT/INR - ( 2024 11:15 )   PT: 12.0 sec;   INR: 1.01 ratio         PTT - ( 2024 11:15 )  PTT:30.5 sec  Urinalysis Basic - ( 2024 11:15 )    Color: Yellow / Appearance: Turbid / S.015 / pH: x  Gluc: 114 mg/dL / Ketone: Trace mg/dL  / Bili: Negative / Urobili: 0.2 mg/dL   Blood: x / Protein: 300 mg/dL / Nitrite: Positive   Leuk Esterase: Large / RBC: 25-50 /HPF / WBC TNTC /HPF   Sq Epi: x / Non Sq Epi: x / Bacteria: Many /HPF                Imaging:      < from: CT Abdomen and Pelvis w/ IV Cont (24 @ 13:03) >    ACC: [id number] EXAM:  CT ABDOMEN AND PELVIS IC   ORDERED BY: ROSANNA MANRIQUEZ     PROCEDURE DATE:  2024          INTERPRETATION:  CLINICAL INFORMATION: Bilateral ureteral stent placement.    COMPARISON: CT abdomen/pelvis 10/6/2023    CONTRAST/COMPLICATIONS:  IV Contrast: Omnipaque 350  95 cc administered   05 cc discarded  Oral Contrast: NONE  Complications: None reported at time of study completion    PROCEDURE:  CT of the Abdomen and Pelvis was performed.  Sagittal and coronal reformatswere performed.    FINDINGS:  LOWER CHEST: Within normal limits.    LIVER: Within normal limits.  BILE DUCTS: Normal caliber.  GALLBLADDER: Within normal limits.  SPLEEN: Within normal limits.  PANCREAS: Within normal limits.  ADRENALS: Within normallimits.  KIDNEYS/URETERS: Worsening moderate to severe bilateral   hydroureteronephrosis despite the presence of bilateral ureteral stents,   with proximal stents coiled in the renal pelvis and distal stent is   coiled within the bladder. There is bilateral ureteral wall thickening.   Bilateral renal cysts measuring up to 6 cm in the right kidney.    BLADDER: Diffuse bladder wall thickening and trabeculation.  REPRODUCTIVE ORGANS: Prostate within normal limits.    BOWEL: No bowel obstruction. Status post abdominal perineal resection.   Left lower quadrant ostomy.  PERITONEUM: No ascites.  VESSELS: Atherosclerotic changes. IVC filter.  RETROPERITONEUM/LYMPH NODES: Mild retroperitoneal lymphadenopathy  .  ABDOMINAL WALL: Postsurgical changes.  BONES: Degenerative changes.    IMPRESSION:  1.  Worsened moderate to severe bilateral hydroureteronephrosis despite   the presence of bilateral ureteral stents in appropriate position.   Correlate for stent malfunction.    2.  Ureteral and bladder wall thickening, which may be a combination of   instrumentation and cystitis/ascending urinary tract infection.    3.  Mild retroperitoneal lymphadenopathy, likely reactive.        --- End of Report ---            CAREY COLÓN MD; Attending Radiologist  This document has been electronically signed. 2024  1:22PM    < end of copied text >  < from: US Testicles (11.15. @ 18:20) >    EXAM: [id number] - US SCROTUM AND CONTENTS  - ORDERED BY: MALATHI ACUÑA      PROCEDURE DATE:  11/15/2023           INTERPRETATION:  CLINICAL INFORMATION: Scrotal pain and swelling    COMPARISON: Scrotal ultrasound 2023    TECHNIQUE: Testicular ultrasound utilizing color and spectral Doppler.    FINDINGS:    RIGHT:  Right testis: 3.4 cm x 2.1 cm x 3.0 cm. Normal echogenicity and   echotexture with no masses or areas of architectural distortion. Normal   arterial and venous blood flow pattern.  Right epididymis: Within normal limits aside from an 8 mm cyst.  Right hydrocele: Small  Right varicocele: None.    LEFT:  Left testis: 3.3 x 2.0 cm x 2.8 cm. Normal echogenicity and echotexture   with no masses or areas of architectural distortion. Normal arterial and   venous blood flow pattern.  Left epididymis: Within normal limits.  Left hydrocele: Small  Left varicocele: None.    IMPRESSION:    8 mm right epididymal cyst.  Left epididymal cyst no longer seen.  Small bilateral hydroceles again noted.      --- End of Report ---               MOLLY FUENTES MD; Attending Radiologist   This document has been electronically signed. 2023 10:24PM    < end of copied text >     Chief Complaint:  Patient is a 58y old  Male who presents with a chief complaint of fevers    HPI:     59 Y/O male with a PMHx of Anal ca, prostate ca s/p radiation therapy, HTN, HLD, with a PSHx of Suarez's, stent placement and most recent stent exchange with Dr. Mathew on 2023 presents to the ED for fevers. He states that he began having fevers over a week ago with the highest recorded as 103.1F. States that he took 5days of Augmentin and Tylenol to help control the fevers with no relief. Patient has a known history of chronic penile pain, burning, tingling, urinary incontinence with use of depends. He denies chills, N/V/D, chest pain, SOB, hematuria.       PMH/PSH:PAST MEDICAL & SURGICAL HISTORY:  HTN (hypertension)      HLD (hyperlipidemia)      HIV infection      Depressive disorder      Anxiety      Prostate cancer      Anal cancer      Diverticulosis      Lung cancer      Unspecified abdominal pain      Abdominal pain      BPH (benign prostatic hyperplasia)      Insomnia      Anal lesion      S/P colostomy      History of gastroscopy      Ureteral stent present          Allergies:  No Known Allergies      Medications:      Review of Systems:  Negative except for HPI    Relevant Family History:   FAMILY HISTORY:  FH: prostate cancer    FH: breast cancer        Relevant Social History: Alcohol ( -) , Tobacco ( -) , Illicit drugs (- )     Physical Exam:    Vital Signs:  Vital Signs Last 24 Hrs  T(C): 37.2 (2024 13:28), Max: 37.2 (2024 13:28)  T(F): 98.9 (2024 13:28), Max: 98.9 (2024 13:28)  HR: 88 (2024 13:28) (88 - 108)  BP: 134/98 (2024 13:28) (122/89 - 134/98)  BP(mean): --  RR: 18 (2024 13:28) (18 - 18)  SpO2: 98% (2024 13:28) (95% - 98%)    Parameters below as of 2024 13:28  Patient On (Oxygen Delivery Method): room air      Daily Height in cm: 180.34 (2024 09:45)    Daily     General:  Appears stated age, no distress  HEENT:  NC/AT,  conjunctivae clear and pink  Chest:  Full & symmetric excursion, no increased effort  Cardiovascular:  Regular rhythm, S1, S2  Abdomen:  Soft, non tender, non distended, ostomy pink, viable with gas in bag  : Uncircumcised phallus with TTP of bilateral testicles, no discharge from penis   Extremities:  no cyanosis, clubbing or edema  Skin:  No rash/erythema/ecchymoses/petechiae/wounds/abscess/warm/dry  Neuro/Psych:  Alert, oriented, no asterixis, no tremor, no encephalopathy    Laboratory:                          11.6   7.24  )-----------( 328      ( 2024 11:15 )             36.6     -14    143  |  109<H>  |  13  ----------------------------<  114<H>  4.0   |  32<H>  |  1.29    Ca    9.3      2024 11:15    TPro  8.0  /  Alb  3.3  /  TBili  0.2  /  DBili  x   /  AST  18  /  ALT  18  /  AlkPhos  87  -14    LIVER FUNCTIONS - ( 2024 11:15 )  Alb: 3.3 g/dL / Pro: 8.0 gm/dL / ALK PHOS: 87 U/L / ALT: 18 U/L / AST: 18 U/L / GGT: x           PT/INR - ( 2024 11:15 )   PT: 12.0 sec;   INR: 1.01 ratio         PTT - ( 2024 11:15 )  PTT:30.5 sec  Urinalysis Basic - ( 2024 11:15 )    Color: Yellow / Appearance: Turbid / S.015 / pH: x  Gluc: 114 mg/dL / Ketone: Trace mg/dL  / Bili: Negative / Urobili: 0.2 mg/dL   Blood: x / Protein: 300 mg/dL / Nitrite: Positive   Leuk Esterase: Large / RBC: 25-50 /HPF / WBC TNTC /HPF   Sq Epi: x / Non Sq Epi: x / Bacteria: Many /HPF                Imaging:      < from: CT Abdomen and Pelvis w/ IV Cont (24 @ 13:03) >    ACC: [id number] EXAM:  CT ABDOMEN AND PELVIS IC   ORDERED BY: ROSANNA MANRIQUEZ     PROCEDURE DATE:  2024          INTERPRETATION:  CLINICAL INFORMATION: Bilateral ureteral stent placement.    COMPARISON: CT abdomen/pelvis 10/6/2023    CONTRAST/COMPLICATIONS:  IV Contrast: Omnipaque 350  95 cc administered   05 cc discarded  Oral Contrast: NONE  Complications: None reported at time of study completion    PROCEDURE:  CT of the Abdomen and Pelvis was performed.  Sagittal and coronal reformatswere performed.    FINDINGS:  LOWER CHEST: Within normal limits.    LIVER: Within normal limits.  BILE DUCTS: Normal caliber.  GALLBLADDER: Within normal limits.  SPLEEN: Within normal limits.  PANCREAS: Within normal limits.  ADRENALS: Within normallimits.  KIDNEYS/URETERS: Worsening moderate to severe bilateral   hydroureteronephrosis despite the presence of bilateral ureteral stents,   with proximal stents coiled in the renal pelvis and distal stent is   coiled within the bladder. There is bilateral ureteral wall thickening.   Bilateral renal cysts measuring up to 6 cm in the right kidney.    BLADDER: Diffuse bladder wall thickening and trabeculation.  REPRODUCTIVE ORGANS: Prostate within normal limits.    BOWEL: No bowel obstruction. Status post abdominal perineal resection.   Left lower quadrant ostomy.  PERITONEUM: No ascites.  VESSELS: Atherosclerotic changes. IVC filter.  RETROPERITONEUM/LYMPH NODES: Mild retroperitoneal lymphadenopathy  .  ABDOMINAL WALL: Postsurgical changes.  BONES: Degenerative changes.    IMPRESSION:  1.  Worsened moderate to severe bilateral hydroureteronephrosis despite   the presence of bilateral ureteral stents in appropriate position.   Correlate for stent malfunction.    2.  Ureteral and bladder wall thickening, which may be a combination of   instrumentation and cystitis/ascending urinary tract infection.    3.  Mild retroperitoneal lymphadenopathy, likely reactive.        --- End of Report ---            CAREY COLÓN MD; Attending Radiologist  This document has been electronically signed. 2024  1:22PM    < end of copied text >  < from: US Testicles (11.15. @ 18:20) >    EXAM: [id number] - US SCROTUM AND CONTENTS  - ORDERED BY: MALATHI ACUÑA      PROCEDURE DATE:  11/15/2023           INTERPRETATION:  CLINICAL INFORMATION: Scrotal pain and swelling    COMPARISON: Scrotal ultrasound 2023    TECHNIQUE: Testicular ultrasound utilizing color and spectral Doppler.    FINDINGS:    RIGHT:  Right testis: 3.4 cm x 2.1 cm x 3.0 cm. Normal echogenicity and   echotexture with no masses or areas of architectural distortion. Normal   arterial and venous blood flow pattern.  Right epididymis: Within normal limits aside from an 8 mm cyst.  Right hydrocele: Small  Right varicocele: None.    LEFT:  Left testis: 3.3 x 2.0 cm x 2.8 cm. Normal echogenicity and echotexture   with no masses or areas of architectural distortion. Normal arterial and   venous blood flow pattern.  Left epididymis: Within normal limits.  Left hydrocele: Small  Left varicocele: None.    IMPRESSION:    8 mm right epididymal cyst.  Left epididymal cyst no longer seen.  Small bilateral hydroceles again noted.      --- End of Report ---               MOLLY FUENTES MD; Attending Radiologist   This document has been electronically signed. 2023 10:24PM    < end of copied text >     Chief Complaint:  Patient is a 58y old  Male who presents with a chief complaint of fevers    HPI:     59 Y/O male with a PMHx of Anal ca, prostate ca s/p radiation therapy, HTN, HLD, with a PSHx of Suarez's, stent placement and most recent stent exchange with Dr. Mathew on 2023 presents to the ED for fevers. He states that he began having fevers over a week ago with the highest recorded as 103.1F. States that he took 5days of Augmentin and Tylenol to help control the fevers with no relief. Patient has a known history of chronic penile pain, burning, tingling, urinary incontinence with use of depends. He denies chills, N/V/D, chest pain, SOB, hematuria.       PMH/PSH:PAST MEDICAL & SURGICAL HISTORY:  HTN (hypertension)      HLD (hyperlipidemia)      HIV infection      Depressive disorder      Anxiety      Prostate cancer      Anal cancer      Diverticulosis      Lung cancer      Unspecified abdominal pain      Abdominal pain      BPH (benign prostatic hyperplasia)      Insomnia      Anal lesion      S/P colostomy      History of gastroscopy      Ureteral stent present          Allergies:  No Known Allergies      Medications:      Review of Systems:  Negative except for HPI    Relevant Family History:   FAMILY HISTORY:  FH: prostate cancer    FH: breast cancer        Relevant Social History: Alcohol ( -) , Tobacco ( -) , Illicit drugs (- )     Physical Exam:    Vital Signs:  Vital Signs Last 24 Hrs  T(C): 37.2 (2024 13:28), Max: 37.2 (2024 13:28)  T(F): 98.9 (2024 13:28), Max: 98.9 (2024 13:28)  HR: 88 (2024 13:28) (88 - 108)  BP: 134/98 (2024 13:28) (122/89 - 134/98)  BP(mean): --  RR: 18 (2024 13:28) (18 - 18)  SpO2: 98% (2024 13:28) (95% - 98%)    Parameters below as of 2024 13:28  Patient On (Oxygen Delivery Method): room air      Daily Height in cm: 180.34 (2024 09:45)    Daily     General:  Appears stated age, no distress  HEENT:  NC/AT,  conjunctivae clear and pink  Chest:  Full & symmetric excursion, no increased effort  Cardiovascular:  Regular rhythm, S1, S2  Abdomen:  Soft, non tender, non distended, ostomy pink, viable with gas in bag  : Uncircumcised phallus with TTP of bilateral testicles, no discharge from penis   Extremities:  no cyanosis, clubbing or edema  Skin:  No rash/erythema/ecchymoses/petechiae/wounds/abscess/warm/dry  Neuro/Psych:  Alert, oriented, no asterixis, no tremor, no encephalopathy    Laboratory:                          11.6   7.24  )-----------( 328      ( 2024 11:15 )             36.6     -14    143  |  109<H>  |  13  ----------------------------<  114<H>  4.0   |  32<H>  |  1.29    Ca    9.3      2024 11:15    TPro  8.0  /  Alb  3.3  /  TBili  0.2  /  DBili  x   /  AST  18  /  ALT  18  /  AlkPhos  87  -14    LIVER FUNCTIONS - ( 2024 11:15 )  Alb: 3.3 g/dL / Pro: 8.0 gm/dL / ALK PHOS: 87 U/L / ALT: 18 U/L / AST: 18 U/L / GGT: x           PT/INR - ( 2024 11:15 )   PT: 12.0 sec;   INR: 1.01 ratio         PTT - ( 2024 11:15 )  PTT:30.5 sec  Urinalysis Basic - ( 2024 11:15 )    Color: Yellow / Appearance: Turbid / S.015 / pH: x  Gluc: 114 mg/dL / Ketone: Trace mg/dL  / Bili: Negative / Urobili: 0.2 mg/dL   Blood: x / Protein: 300 mg/dL / Nitrite: Positive   Leuk Esterase: Large / RBC: 25-50 /HPF / WBC TNTC /HPF   Sq Epi: x / Non Sq Epi: x / Bacteria: Many /HPF                Imaging:      < from: CT Abdomen and Pelvis w/ IV Cont (24 @ 13:03) >    ACC: [id number] EXAM:  CT ABDOMEN AND PELVIS IC   ORDERED BY: ROSANNA MANRIQUEZ     PROCEDURE DATE:  2024          INTERPRETATION:  CLINICAL INFORMATION: Bilateral ureteral stent placement.    COMPARISON: CT abdomen/pelvis 10/6/2023    CONTRAST/COMPLICATIONS:  IV Contrast: Omnipaque 350  95 cc administered   05 cc discarded  Oral Contrast: NONE  Complications: None reported at time of study completion    PROCEDURE:  CT of the Abdomen and Pelvis was performed.  Sagittal and coronal reformatswere performed.    FINDINGS:  LOWER CHEST: Within normal limits.    LIVER: Within normal limits.  BILE DUCTS: Normal caliber.  GALLBLADDER: Within normal limits.  SPLEEN: Within normal limits.  PANCREAS: Within normal limits.  ADRENALS: Within normallimits.  KIDNEYS/URETERS: Worsening moderate to severe bilateral   hydroureteronephrosis despite the presence of bilateral ureteral stents,   with proximal stents coiled in the renal pelvis and distal stent is   coiled within the bladder. There is bilateral ureteral wall thickening.   Bilateral renal cysts measuring up to 6 cm in the right kidney.    BLADDER: Diffuse bladder wall thickening and trabeculation.  REPRODUCTIVE ORGANS: Prostate within normal limits.    BOWEL: No bowel obstruction. Status post abdominal perineal resection.   Left lower quadrant ostomy.  PERITONEUM: No ascites.  VESSELS: Atherosclerotic changes. IVC filter.  RETROPERITONEUM/LYMPH NODES: Mild retroperitoneal lymphadenopathy  .  ABDOMINAL WALL: Postsurgical changes.  BONES: Degenerative changes.    IMPRESSION:  1.  Worsened moderate to severe bilateral hydroureteronephrosis despite   the presence of bilateral ureteral stents in appropriate position.   Correlate for stent malfunction.    2.  Ureteral and bladder wall thickening, which may be a combination of   instrumentation and cystitis/ascending urinary tract infection.    3.  Mild retroperitoneal lymphadenopathy, likely reactive.        --- End of Report ---            CAREY COLÓN MD; Attending Radiologist  This document has been electronically signed. 2024  1:22PM    < end of copied text >  < from: US Testicles (11.15. @ 18:20) >    EXAM: [id number] - US SCROTUM AND CONTENTS  - ORDERED BY: MALATHI ACUÑA      PROCEDURE DATE:  11/15/2023           INTERPRETATION:  CLINICAL INFORMATION: Scrotal pain and swelling    COMPARISON: Scrotal ultrasound 2023    TECHNIQUE: Testicular ultrasound utilizing color and spectral Doppler.    FINDINGS:    RIGHT:  Right testis: 3.4 cm x 2.1 cm x 3.0 cm. Normal echogenicity and   echotexture with no masses or areas of architectural distortion. Normal   arterial and venous blood flow pattern.  Right epididymis: Within normal limits aside from an 8 mm cyst.  Right hydrocele: Small  Right varicocele: None.    LEFT:  Left testis: 3.3 x 2.0 cm x 2.8 cm. Normal echogenicity and echotexture   with no masses or areas of architectural distortion. Normal arterial and   venous blood flow pattern.  Left epididymis: Within normal limits.  Left hydrocele: Small  Left varicocele: None.    IMPRESSION:    8 mm right epididymal cyst.  Left epididymal cyst no longer seen.  Small bilateral hydroceles again noted.      --- End of Report ---               MOLLY FUENTES MD; Attending Radiologist   This document has been electronically signed. 2023 10:24PM    < end of copied text >

## 2024-01-14 NOTE — ED PROVIDER NOTE - NSFOLLOWUPINSTRUCTIONS_ED_ALL_ED_FT
You were seen in the emergency department for fever and abdominal pain.   Your workup in the emergency department includes bloodwork, urine study and CT scan of abdomen and pelvis.   The presumed diagnosis is urinary tract infection.   You can find the results of all the tests in this discharge packet.   Please follow up with your primary care doctor within 48 hours for continuation of care.   Please follow up with Dr. Mathew on Tuesday for further management.     Return to the emergency department if you experience any new/concerning/worsening symptoms such as but not limited to: fever (>100.3F), intractable nausea, vomiting, chest pain, shortness of breath, worsening abdominal pain.     You are prescribed:  1) Cefpodoxime: take 200mg every 12 hours for 7 days

## 2024-01-14 NOTE — ED PROVIDER NOTE - CONDITION AT DISCHARGE:
-medication(s) as prescribed or directed  -discontinue other OTC cold medications as discussed  -symptomatic care as discussed/outlined in AVS     Follow-up with primary care provider in the next 2-3 days for re-evaluation. Seek immediate medical attention at the nearest Emergency Room for persistent, new, worsening or concerning symptoms as discussed and/or indicated/outlined below in AVS.    Please review additional instructions as documented in the AVS below.    Thank you for visiting [facility].   
Improved

## 2024-01-14 NOTE — ED PROVIDER NOTE - PROGRESS NOTE DETAILS
spoke to urology, they will see pt. CT shows possible stent malfunction. Charles PGY3   Discussed with urology - imaging reviewed by Dr. Mathew and reports that stent appear to be in place; recommended discharge with antibiotics and he will see the patient on Tuesday. Reviewed results with patient and wife at bedside - they are agreeable with the plan. Return precautions reviewed.

## 2024-01-14 NOTE — CONSULT NOTE ADULT - ASSESSMENT
57 Y/O male with a PMHx of Anal ca, prostate ca s/p radiation therapy, HTN, HLD, with a PSHx of Suarez's, stent placement and most recent stent exchange with Dr. Mathew on 12/13/2023 presents to the ED for fevers. He states that he began having fevers over a week ago with the highest recorded as 103.1F. States that he took 5days of Augmentin and Tylenol to help control the fevers with no relief.  CT reveals moderate bilateral hydronephrosis. Previous CT scans reviewed revealing chronic bilateral hydronephrosis. US of testicles unremarkable.   No leukocytosis at present. BUN/Cr WNL. UA reveals patient with UTI.       PLAN:     - Discharge home with course of ABx   - Maintain outpatient F/U appointment with Dr. Mathew on TUE   - Drink plenty of fluids to remain hydrated  - Discussed with

## 2024-01-14 NOTE — ED ADULT NURSE NOTE - NSFALLHARMRISKINTERV_ED_ALL_ED
Assistance OOB with selected safe patient handling equipment if applicable/Assistance with ambulation/Communicate risk of Fall with Harm to all staff, patient, and family/Monitor gait and stability/Provide patient with walking aids/Provide visual cue: red socks, yellow wristband, yellow gown, etc/Reinforce activity limits and safety measures with patient and family/Bed in lowest position, wheels locked, appropriate side rails in place/Call bell, personal items and telephone in reach/Instruct patient to call for assistance before getting out of bed/chair/stretcher/Non-slip footwear applied when patient is off stretcher/[place] to call system/Physically safe environment - no spills, clutter or unnecessary equipment/Purposeful Proactive Rounding/Room/bathroom lighting operational, light cord in reach Assistance OOB with selected safe patient handling equipment if applicable/Assistance with ambulation/Communicate risk of Fall with Harm to all staff, patient, and family/Monitor gait and stability/Provide patient with walking aids/Provide visual cue: red socks, yellow wristband, yellow gown, etc/Reinforce activity limits and safety measures with patient and family/Bed in lowest position, wheels locked, appropriate side rails in place/Call bell, personal items and telephone in reach/Instruct patient to call for assistance before getting out of bed/chair/stretcher/Non-slip footwear applied when patient is off stretcher/[place] to call system/Physically safe environment - no spills, clutter or unnecessary equipment/Purposeful Proactive Rounding/Room/bathroom lighting operational, light cord in reach

## 2024-01-14 NOTE — ED PROVIDER NOTE - PATIENT PORTAL LINK FT
You can access the FollowMyHealth Patient Portal offered by [facility] by registering at the following website: [email or web address]. By joining [employer]’s FollowMyHealth portal, you will also be able to view your health information using other applications (apps) compatible with our system. You can access the FollowMyHealth Patient Portal offered by [facility] by registering at the following website: [email or web address]. By joining [employer]’s FollowMyHealth portal, you will also be able to view your health information using other applications (apps) compatible with our system.

## 2024-01-14 NOTE — ED ADULT NURSE NOTE - OBJECTIVE STATEMENT
Pt presents to the ed c/o worsening abdominal pain and penile pain. Pt was noted to have LLQ ostomy with no stool in bag. Pt states he last emptied his ostomy bag yesterday and that he has frequent constipation. Pt was noted to have large round abdomin with hypoactive bowel sounds and upper abdominal tenderness on palpation. pt denies nausea and vomiting. pt c/o penile pain, urinary frequency, urgency, dysuria and urinary incontinence. Pt reports having "metal stents" placed in Dec 2023. Pt was noted to be wearing adult diaper and having urinary dribbling. Pt voided and urine was noted to be cloudy. pt reports fever for 2 weeks. pt denies chest pain or palpitations.

## 2024-01-14 NOTE — ED PROVIDER NOTE - OBJECTIVE STATEMENT
Patient is a 58 year-old-male with history of DM, HTN, HLD, HIV+, anal cancer s/p resection with colostomy bag c/b ureteral stricture with recent BL ureteral JJ stent placement presents with fever/abdominal/penile pain. Reports that he has been having persistent pain since the procedure with intermittent fever at home, with urinary urgency, +dysuria, +urinary frequency. Denies chest pain/sob.

## 2024-01-16 ENCOUNTER — APPOINTMENT (OUTPATIENT)
Dept: COLORECTAL SURGERY | Facility: CLINIC | Age: 59
End: 2024-01-16
Payer: MEDICAID

## 2024-01-16 ENCOUNTER — APPOINTMENT (OUTPATIENT)
Dept: UROLOGY | Facility: CLINIC | Age: 59
End: 2024-01-16
Payer: MEDICAID

## 2024-01-16 VITALS
SYSTOLIC BLOOD PRESSURE: 109 MMHG | HEART RATE: 86 BPM | TEMPERATURE: 97.8 F | DIASTOLIC BLOOD PRESSURE: 77 MMHG | OXYGEN SATURATION: 100 %

## 2024-01-16 PROCEDURE — 99213 OFFICE O/P EST LOW 20 MIN: CPT

## 2024-01-16 RX ORDER — CEFPODOXIME PROXETIL 200 MG/1
200 TABLET, FILM COATED ORAL TWICE DAILY
Qty: 14 | Refills: 0 | Status: ACTIVE | COMMUNITY
Start: 2024-01-16 | End: 1900-01-01

## 2024-01-16 RX ORDER — SULFAMETHOXAZOLE AND TRIMETHOPRIM 800; 160 MG/1; MG/1
800-160 TABLET ORAL TWICE DAILY
Qty: 14 | Refills: 0 | Status: ACTIVE | COMMUNITY
Start: 2024-01-16 | End: 1900-01-01

## 2024-01-16 NOTE — ASSESSMENT
[FreeTextEntry1] : Recurrent anal cancer status post abdominal perineal resection with flap repair radiation x2, patient now with ureteral stricture Bilaterally -Continue pain control per palliative care -Follow up with urology regarding metal stents, cystitis and hydronephrosis -Continue to follow up with medical oncology -Followup in 6 months for reevaluation -Recent CT scan images and report reviewed

## 2024-01-16 NOTE — PHYSICAL EXAM
[FreeTextEntry1] : Alert and oriented x3 Moves all extremities no edema Abdomen soft stoma functioningIncision healed No rashes External perineal exam well-healed rectus flap

## 2024-01-16 NOTE — HISTORY OF PRESENT ILLNESS
[FreeTextEntry1] : 59y/o male, recent  bilateral hydronephrosis mostly on the left side possibly related to colostomy, underwent bilateral metal stent placement on 12/12/2023. Referred recently to ER for UTI.  Patient c/o numbness in penis and chronic abdominal pain Referred patient to Neurologist for numbness in penis, still visit pending. Referred patient to pain management for chronic abdominal pain Advised patient to f/u with onco for anal and prostate CA  Comes to office for F/U and abx prescription.

## 2024-01-16 NOTE — HISTORY OF PRESENT ILLNESS
[FreeTextEntry1] : Status post abdominal perineal resection for perforated anus/rectum after radiation therapy for prostate and anal cancer. Patient progressing well. Tolerating diet. Stoma functioning. Draining left thigh wound with decreased output  4/26/23-56 yo male s/p APR for anal cancer w/ radiation x2.  Pt with intermittent abdominal pain.  noted to have ureteral strictures requiring stents b/l secondary to radiation.  Pt recently presented to ER and was noted to have UTI.  Pt had CT which otherwise w/ wnl.  Reports constipation. Takes miralax daily.  no fevers or chills no n/v.  No aggravating factors  July 18, 2023-patient with persistent intermittent abdominal pain of unclear origin. Patient with bilateral stent placement for ureteral strictures and bilateral hydronephrosis. No fevers or chills currently no nausea or vomiting. Tolerating diet stoma functioning without event no specific aggravating factors  January 16, 2024-patient presents today for routine followup. Persistent abdominal pain requiring multiple hospital visits to the emergency room. Most recent on January 4, 2024. CT scan shows no signs of recurrent disease, likely cystitis and increased hydronephrosis despite bilateral ureteral stents. Patient is tolerating diet with chronic constipation due to pain medication. No fevers or chills no nausea or vomiting  no aggravating factors

## 2024-01-16 NOTE — ASSESSMENT
[FreeTextEntry1] : 59y/o male, recent  bilateral hydronephrosis mostly on the left side possibly related to colostomy, underwent bilateral metal stent placement on 12/12/2023. Referred recently to ER for UTI.  Patient c/o numbness in penis and chronic abdominal pain Referred patient to Neurologist for numbness in penis, still visit pending. Referred patient to pain management for chronic abdominal pain Advised patient to f/u with onco for anal and prostate CA  Comes to office for F/U and abx prescription.  Prescribing ABX. F/U in 2 wks with new urine cx.

## 2024-01-17 LAB
-  AMIKACIN: SIGNIFICANT CHANGE UP
-  AZTREONAM: SIGNIFICANT CHANGE UP
-  CEFEPIME: SIGNIFICANT CHANGE UP
-  CEFTAZIDIME: SIGNIFICANT CHANGE UP
-  CIPROFLOXACIN: SIGNIFICANT CHANGE UP
-  IMIPENEM: SIGNIFICANT CHANGE UP
-  LEVOFLOXACIN: SIGNIFICANT CHANGE UP
-  MEROPENEM: SIGNIFICANT CHANGE UP
-  PIPERACILLIN/TAZOBACTAM: SIGNIFICANT CHANGE UP
CULTURE RESULTS: ABNORMAL
METHOD TYPE: SIGNIFICANT CHANGE UP
ORGANISM # SPEC MICROSCOPIC CNT: ABNORMAL
ORGANISM # SPEC MICROSCOPIC CNT: SIGNIFICANT CHANGE UP
SPECIMEN SOURCE: SIGNIFICANT CHANGE UP

## 2024-01-18 ENCOUNTER — APPOINTMENT (OUTPATIENT)
Dept: INTERNAL MEDICINE | Facility: CLINIC | Age: 59
End: 2024-01-18
Payer: MEDICAID

## 2024-01-18 VITALS
TEMPERATURE: 97.2 F | OXYGEN SATURATION: 96 % | WEIGHT: 208 LBS | DIASTOLIC BLOOD PRESSURE: 73 MMHG | HEART RATE: 100 BPM | HEIGHT: 71 IN | BODY MASS INDEX: 29.12 KG/M2 | SYSTOLIC BLOOD PRESSURE: 103 MMHG

## 2024-01-18 DIAGNOSIS — T40.2X5A DRUG INDUCED CONSTIPATION: ICD-10-CM

## 2024-01-18 DIAGNOSIS — K59.03 DRUG INDUCED CONSTIPATION: ICD-10-CM

## 2024-01-18 DIAGNOSIS — Z00.00 ENCOUNTER FOR GENERAL ADULT MEDICAL EXAMINATION W/OUT ABNORMAL FINDINGS: ICD-10-CM

## 2024-01-18 PROCEDURE — G0444 DEPRESSION SCREEN ANNUAL: CPT | Mod: 59

## 2024-01-18 PROCEDURE — 99396 PREV VISIT EST AGE 40-64: CPT | Mod: 25

## 2024-01-18 RX ORDER — CIPROFLOXACIN LACTATE 400MG/40ML
1 VIAL (ML) INTRAVENOUS
Qty: 14 | Refills: 0
Start: 2024-01-18 | End: 2024-01-24

## 2024-01-18 NOTE — REVIEW OF SYSTEMS
[Negative] : Heme/Lymph [FreeTextEntry8] : testicular pain, penile pain [FreeTextEntry9] : back pain

## 2024-01-18 NOTE — HISTORY OF PRESENT ILLNESS
[FreeTextEntry1] : annual exam  [de-identified] : annual exam  Patient with multiple medical issues HIV, anal cancer, pain, urinary issues s/p stents radiation caused a lot of problems - needs urinary stents and had a colostomy  had a recent UTI with fevers went to ER got iv abx and discharged with home abx  followed up with urology and now has seen neurology  psych issues- concerned that olanzapine is causing constipation and stopped abruptly advised to contact psych and possibly ween off the medication

## 2024-01-18 NOTE — ASSESSMENT
[FreeTextEntry1] : annual exam  Patient with multiple medical issues HIV, anal cancer, pain, urinary issues s/p stents  had a recent UTI with fevers went to ER got iv abx and discharged with home abx  followed up with urology and now has seen neurology  psych issues- concerned that olanzapine is causing constipation and stopped abruptly advised to contact psych and possibly ween off the medication  opiod induced constipation- relistor ordered hopefully this will provide some relief   health maintenance- due for colonoscopy questionable with the colostomy weather that can get done

## 2024-01-18 NOTE — ED POST DISCHARGE NOTE - RESULT SUMMARY
spoke with patient's wife regarding urine cx and resistance to PO antibiotics.  She states it is very difficult to get him into the hospital given his medical conditions, cipro with intermediate sensitivity, she elected to trial this for now and speak with urologist tomorrow and monitor him closely.  understands cipro might not treat infection sufficiently -Darci Maria PA-C

## 2024-01-18 NOTE — HEALTH RISK ASSESSMENT
[Poor] : ~his/her~ mood as  poor [Intercurrent Urgi Care visits] : went to urgent care [No] : In the past 12 months have you used drugs other than those required for medical reasons? No [No falls in past year] : Patient reported no falls in the past year [Little interest or pleasure doing things] : 1) Little interest or pleasure doing things [3] : 1) Little interest or pleasure doing things for nearly every day (3) [Feeling down, depressed, or hopeless] : 2) Feeling down, depressed, or hopeless [1] : 2) Feeling down, depressed, or hopeless for several days (1) [Learning/Retaining New Information] : difficulty learning/retaining new information [With Significant Other] : lives with significant other [On disability] : on disability [] :  [Feels Safe at Home] : Feels safe at home [Smoke Detector] : smoke detector [Carbon Monoxide Detector] : carbon monoxide detector [Safety elements used in home] : safety elements used in home [Seat Belt] :  uses seat belt [Former] : Former [< 15 Years] : < 15 Years [FreeTextEntry1] : stomach and penis hurts [1/2 of Days or More (2)] : 3.) Trouble falling asleep, or sleeping too much? Half the days or more [Several Days (1)] : 7.) Trouble concentrating on things, such as reading a newspaper or watching television? Several days [Nearly Every Day (3)] : 8.) Moving or speaking so slowly that other people could have noticed, or the opposite, moving or speaking faster than usual? Nearly every day [Very Difficult] : How difficult have these problems made it for you to do your work, take care of things at home, or get along with people? Very difficult [PHQ-9 Positive] : PHQ-9 Positive [I have developed a follow-up plan documented below in the note.] : I have developed a follow-up plan documented below in the note. [de-identified] : visited on 01/14/2024 for pain in penis and stomach result- an acute UTI [de-identified] : visited the urologist and surgeon on 01/16/2024 for a check up on current condition. [de-identified] : none [de-identified] : fair [[id number]] : 4 [[id number]] : 15 [Change in mental status noted] : No change in mental status noted [Language] : denies difficulty with language [Behavior] : denies difficulty with behavior [Handling Complex Tasks] : denies difficulty handling complex tasks [Reasoning] : denies difficulty with reasoning [Spatial Ability and Orientation] : denies difficulty with spatial ability and orientation [Sexually Active] : not sexually active [Reports changes in hearing] : Reports no changes in hearing [Reports changes in vision] : Reports no changes in vision [Reports changes in dental health] : Reports no changes in dental health [Sunscreen] : does not use sunscreen [ColonoscopyComments] : never had one- + anal cancer [de-identified] : unable to retain new information

## 2024-01-19 ENCOUNTER — RX RENEWAL (OUTPATIENT)
Age: 59
End: 2024-01-19

## 2024-01-19 ENCOUNTER — INPATIENT (INPATIENT)
Facility: HOSPITAL | Age: 59
LOS: 6 days | Discharge: HOME | End: 2024-01-26
Attending: HOSPITALIST | Admitting: HOSPITALIST
Payer: MEDICAID

## 2024-01-19 VITALS
WEIGHT: 209 LBS | RESPIRATION RATE: 18 BRPM | HEART RATE: 94 BPM | HEIGHT: 71 IN | TEMPERATURE: 99 F | DIASTOLIC BLOOD PRESSURE: 75 MMHG | SYSTOLIC BLOOD PRESSURE: 104 MMHG | OXYGEN SATURATION: 97 %

## 2024-01-19 DIAGNOSIS — K62.9 DISEASE OF ANUS AND RECTUM, UNSPECIFIED: Chronic | ICD-10-CM

## 2024-01-19 DIAGNOSIS — Z96.0 PRESENCE OF UROGENITAL IMPLANTS: Chronic | ICD-10-CM

## 2024-01-19 DIAGNOSIS — Z93.3 COLOSTOMY STATUS: Chronic | ICD-10-CM

## 2024-01-19 DIAGNOSIS — Z98.890 OTHER SPECIFIED POSTPROCEDURAL STATES: Chronic | ICD-10-CM

## 2024-01-19 LAB
ALBUMIN SERPL ELPH-MCNC: 3.1 G/DL — LOW (ref 3.3–5)
ALP SERPL-CCNC: 96 U/L — SIGNIFICANT CHANGE UP (ref 40–120)
ALT FLD-CCNC: 14 U/L — SIGNIFICANT CHANGE UP (ref 12–78)
ANION GAP SERPL CALC-SCNC: 4 MMOL/L — LOW (ref 5–17)
APPEARANCE UR: ABNORMAL
AST SERPL-CCNC: 7 U/L — LOW (ref 15–37)
BACTERIA # UR AUTO: ABNORMAL /HPF
BASOPHILS # BLD AUTO: 0.04 K/UL — SIGNIFICANT CHANGE UP (ref 0–0.2)
BASOPHILS NFR BLD AUTO: 0.5 % — SIGNIFICANT CHANGE UP (ref 0–2)
BILIRUB SERPL-MCNC: <0.1 MG/DL — LOW (ref 0.2–1.2)
BILIRUB UR-MCNC: NEGATIVE — SIGNIFICANT CHANGE UP
BUN SERPL-MCNC: 16 MG/DL — SIGNIFICANT CHANGE UP (ref 7–23)
CALCIUM SERPL-MCNC: 9.3 MG/DL — SIGNIFICANT CHANGE UP (ref 8.5–10.1)
CHLORIDE SERPL-SCNC: 109 MMOL/L — HIGH (ref 96–108)
CO2 SERPL-SCNC: 27 MMOL/L — SIGNIFICANT CHANGE UP (ref 22–31)
COLOR SPEC: ABNORMAL
CREAT SERPL-MCNC: 1.66 MG/DL — HIGH (ref 0.5–1.3)
CULTURE RESULTS: SIGNIFICANT CHANGE UP
CULTURE RESULTS: SIGNIFICANT CHANGE UP
DIFF PNL FLD: ABNORMAL
EGFR: 48 ML/MIN/1.73M2 — LOW
EOSINOPHIL # BLD AUTO: 0.05 K/UL — SIGNIFICANT CHANGE UP (ref 0–0.5)
EOSINOPHIL NFR BLD AUTO: 0.6 % — SIGNIFICANT CHANGE UP (ref 0–6)
GLUCOSE SERPL-MCNC: 103 MG/DL — HIGH (ref 70–99)
GLUCOSE UR QL: NEGATIVE MG/DL — SIGNIFICANT CHANGE UP
HCT VFR BLD CALC: 33.7 % — LOW (ref 39–50)
HGB BLD-MCNC: 10.8 G/DL — LOW (ref 13–17)
IMM GRANULOCYTES NFR BLD AUTO: 0.5 % — SIGNIFICANT CHANGE UP (ref 0–0.9)
KETONES UR-MCNC: NEGATIVE MG/DL — SIGNIFICANT CHANGE UP
LACTATE SERPL-SCNC: 1.3 MMOL/L — SIGNIFICANT CHANGE UP (ref 0.7–2)
LEUKOCYTE ESTERASE UR-ACNC: ABNORMAL
LIDOCAIN IGE QN: 23 U/L — SIGNIFICANT CHANGE UP (ref 13–75)
LYMPHOCYTES # BLD AUTO: 1.77 K/UL — SIGNIFICANT CHANGE UP (ref 1–3.3)
LYMPHOCYTES # BLD AUTO: 20.3 % — SIGNIFICANT CHANGE UP (ref 13–44)
MCHC RBC-ENTMCNC: 28.5 PG — SIGNIFICANT CHANGE UP (ref 27–34)
MCHC RBC-ENTMCNC: 32 G/DL — SIGNIFICANT CHANGE UP (ref 32–36)
MCV RBC AUTO: 88.9 FL — SIGNIFICANT CHANGE UP (ref 80–100)
MONOCYTES # BLD AUTO: 0.63 K/UL — SIGNIFICANT CHANGE UP (ref 0–0.9)
MONOCYTES NFR BLD AUTO: 7.2 % — SIGNIFICANT CHANGE UP (ref 2–14)
NEUTROPHILS # BLD AUTO: 6.21 K/UL — SIGNIFICANT CHANGE UP (ref 1.8–7.4)
NEUTROPHILS NFR BLD AUTO: 70.9 % — SIGNIFICANT CHANGE UP (ref 43–77)
NITRITE UR-MCNC: POSITIVE
NRBC # BLD: 0 /100 WBCS — SIGNIFICANT CHANGE UP (ref 0–0)
PH UR: 6.5 — SIGNIFICANT CHANGE UP (ref 5–8)
PLATELET # BLD AUTO: 484 K/UL — HIGH (ref 150–400)
POTASSIUM SERPL-MCNC: 4.5 MMOL/L — SIGNIFICANT CHANGE UP (ref 3.5–5.3)
POTASSIUM SERPL-SCNC: 4.5 MMOL/L — SIGNIFICANT CHANGE UP (ref 3.5–5.3)
PROT SERPL-MCNC: 9 GM/DL — HIGH (ref 6–8.3)
PROT UR-MCNC: 100 MG/DL
RBC # BLD: 3.79 M/UL — LOW (ref 4.2–5.8)
RBC # FLD: 13.8 % — SIGNIFICANT CHANGE UP (ref 10.3–14.5)
RBC CASTS # UR COMP ASSIST: ABNORMAL /HPF
SODIUM SERPL-SCNC: 140 MMOL/L — SIGNIFICANT CHANGE UP (ref 135–145)
SP GR SPEC: 1.01 — SIGNIFICANT CHANGE UP (ref 1–1.03)
SPECIMEN SOURCE: SIGNIFICANT CHANGE UP
SPECIMEN SOURCE: SIGNIFICANT CHANGE UP
UROBILINOGEN FLD QL: 0.2 MG/DL — SIGNIFICANT CHANGE UP (ref 0.2–1)
WBC # BLD: 8.74 K/UL — SIGNIFICANT CHANGE UP (ref 3.8–10.5)
WBC # FLD AUTO: 8.74 K/UL — SIGNIFICANT CHANGE UP (ref 3.8–10.5)
WBC UR QL: ABNORMAL /HPF (ref 0–5)

## 2024-01-19 PROCEDURE — 76775 US EXAM ABDO BACK WALL LIM: CPT | Mod: 26

## 2024-01-19 PROCEDURE — 99223 1ST HOSP IP/OBS HIGH 75: CPT

## 2024-01-19 PROCEDURE — 76870 US EXAM SCROTUM: CPT | Mod: 26

## 2024-01-19 PROCEDURE — 99285 EMERGENCY DEPT VISIT HI MDM: CPT

## 2024-01-19 RX ORDER — LANOLIN ALCOHOL/MO/W.PET/CERES
3 CREAM (GRAM) TOPICAL AT BEDTIME
Refills: 0 | Status: DISCONTINUED | OUTPATIENT
Start: 2024-01-19 | End: 2024-01-26

## 2024-01-19 RX ORDER — ACETAMINOPHEN 500 MG
1000 TABLET ORAL ONCE
Refills: 0 | Status: COMPLETED | OUTPATIENT
Start: 2024-01-19 | End: 2024-01-19

## 2024-01-19 RX ORDER — ZOLPIDEM TARTRATE 10 MG/1
10 TABLET ORAL AT BEDTIME
Refills: 0 | Status: DISCONTINUED | OUTPATIENT
Start: 2024-01-19 | End: 2024-01-25

## 2024-01-19 RX ORDER — DILTIAZEM HCL 120 MG
1 CAPSULE, EXT RELEASE 24 HR ORAL
Refills: 0 | DISCHARGE

## 2024-01-19 RX ORDER — ATORVASTATIN CALCIUM 80 MG/1
1 TABLET, FILM COATED ORAL
Refills: 0 | DISCHARGE

## 2024-01-19 RX ORDER — SENNA PLUS 8.6 MG/1
2 TABLET ORAL AT BEDTIME
Refills: 0 | Status: DISCONTINUED | OUTPATIENT
Start: 2024-01-19 | End: 2024-01-26

## 2024-01-19 RX ORDER — LACTULOSE 10 G/15ML
10 SOLUTION ORAL DAILY
Qty: 473 | Refills: 0 | Status: ACTIVE | COMMUNITY
Start: 2023-05-19 | End: 1900-01-01

## 2024-01-19 RX ORDER — CEFEPIME 1 G/1
2000 INJECTION, POWDER, FOR SOLUTION INTRAMUSCULAR; INTRAVENOUS EVERY 12 HOURS
Refills: 0 | Status: DISCONTINUED | OUTPATIENT
Start: 2024-01-19 | End: 2024-01-26

## 2024-01-19 RX ORDER — METOPROLOL TARTRATE 50 MG
100 TABLET ORAL DAILY
Refills: 0 | Status: DISCONTINUED | OUTPATIENT
Start: 2024-01-19 | End: 2024-01-26

## 2024-01-19 RX ORDER — ALPRAZOLAM 0.25 MG
1 TABLET ORAL THREE TIMES A DAY
Refills: 0 | Status: DISCONTINUED | OUTPATIENT
Start: 2024-01-19 | End: 2024-01-19

## 2024-01-19 RX ORDER — MORPHINE SULFATE 50 MG/1
15 CAPSULE, EXTENDED RELEASE ORAL ONCE
Refills: 0 | Status: DISCONTINUED | OUTPATIENT
Start: 2024-01-19 | End: 2024-01-20

## 2024-01-19 RX ORDER — ACETAMINOPHEN 500 MG
650 TABLET ORAL EVERY 6 HOURS
Refills: 0 | Status: DISCONTINUED | OUTPATIENT
Start: 2024-01-19 | End: 2024-01-26

## 2024-01-19 RX ORDER — TAMSULOSIN HYDROCHLORIDE 0.4 MG/1
0.4 CAPSULE ORAL AT BEDTIME
Refills: 0 | Status: DISCONTINUED | OUTPATIENT
Start: 2024-01-19 | End: 2024-01-19

## 2024-01-19 RX ORDER — MORPHINE SULFATE 50 MG/1
15 CAPSULE, EXTENDED RELEASE ORAL EVERY 6 HOURS
Refills: 0 | Status: DISCONTINUED | OUTPATIENT
Start: 2024-01-19 | End: 2024-01-20

## 2024-01-19 RX ORDER — OXYCODONE HYDROCHLORIDE 5 MG/1
10 TABLET ORAL EVERY 4 HOURS
Refills: 0 | Status: DISCONTINUED | OUTPATIENT
Start: 2024-01-19 | End: 2024-01-19

## 2024-01-19 RX ORDER — ATORVASTATIN CALCIUM 80 MG/1
10 TABLET, FILM COATED ORAL AT BEDTIME
Refills: 0 | Status: DISCONTINUED | OUTPATIENT
Start: 2024-01-19 | End: 2024-01-19

## 2024-01-19 RX ORDER — ALPRAZOLAM 0.25 MG
1 TABLET ORAL EVERY 6 HOURS
Refills: 0 | Status: DISCONTINUED | OUTPATIENT
Start: 2024-01-19 | End: 2024-01-19

## 2024-01-19 RX ORDER — BICTEGRAVIR SODIUM, EMTRICITABINE, AND TENOFOVIR ALAFENAMIDE FUMARATE 30; 120; 15 MG/1; MG/1; MG/1
1 TABLET ORAL DAILY
Refills: 0 | Status: DISCONTINUED | OUTPATIENT
Start: 2024-01-19 | End: 2024-01-26

## 2024-01-19 RX ORDER — DILTIAZEM HCL 120 MG
240 CAPSULE, EXT RELEASE 24 HR ORAL DAILY
Refills: 0 | Status: DISCONTINUED | OUTPATIENT
Start: 2024-01-19 | End: 2024-01-26

## 2024-01-19 RX ORDER — LACTULOSE 10 G/15ML
10 SOLUTION ORAL DAILY
Refills: 0 | Status: DISCONTINUED | OUTPATIENT
Start: 2024-01-19 | End: 2024-01-26

## 2024-01-19 RX ORDER — CEFEPIME 1 G/1
1000 INJECTION, POWDER, FOR SOLUTION INTRAMUSCULAR; INTRAVENOUS ONCE
Refills: 0 | Status: COMPLETED | OUTPATIENT
Start: 2024-01-19 | End: 2024-01-19

## 2024-01-19 RX ORDER — OXYCODONE HYDROCHLORIDE 5 MG/1
10 TABLET ORAL ONCE
Refills: 0 | Status: DISCONTINUED | OUTPATIENT
Start: 2024-01-19 | End: 2024-01-19

## 2024-01-19 RX ORDER — SODIUM CHLORIDE 9 MG/ML
1000 INJECTION INTRAMUSCULAR; INTRAVENOUS; SUBCUTANEOUS ONCE
Refills: 0 | Status: COMPLETED | OUTPATIENT
Start: 2024-01-19 | End: 2024-01-19

## 2024-01-19 RX ORDER — ONDANSETRON 8 MG/1
4 TABLET, FILM COATED ORAL EVERY 8 HOURS
Refills: 0 | Status: DISCONTINUED | OUTPATIENT
Start: 2024-01-19 | End: 2024-01-26

## 2024-01-19 RX ADMIN — CEFEPIME 100 MILLIGRAM(S): 1 INJECTION, POWDER, FOR SOLUTION INTRAMUSCULAR; INTRAVENOUS at 14:11

## 2024-01-19 RX ADMIN — SODIUM CHLORIDE 1000 MILLILITER(S): 9 INJECTION INTRAMUSCULAR; INTRAVENOUS; SUBCUTANEOUS at 18:21

## 2024-01-19 RX ADMIN — CEFEPIME 1000 MILLIGRAM(S): 1 INJECTION, POWDER, FOR SOLUTION INTRAMUSCULAR; INTRAVENOUS at 18:21

## 2024-01-19 RX ADMIN — SODIUM CHLORIDE 1000 MILLILITER(S): 9 INJECTION INTRAMUSCULAR; INTRAVENOUS; SUBCUTANEOUS at 13:17

## 2024-01-19 RX ADMIN — ZOLPIDEM TARTRATE 10 MILLIGRAM(S): 10 TABLET ORAL at 22:55

## 2024-01-19 RX ADMIN — Medication 400 MILLIGRAM(S): at 18:22

## 2024-01-19 RX ADMIN — MORPHINE SULFATE 15 MILLIGRAM(S): 50 CAPSULE, EXTENDED RELEASE ORAL at 23:03

## 2024-01-19 RX ADMIN — OXYCODONE HYDROCHLORIDE 10 MILLIGRAM(S): 5 TABLET ORAL at 18:21

## 2024-01-19 RX ADMIN — OXYCODONE HYDROCHLORIDE 10 MILLIGRAM(S): 5 TABLET ORAL at 14:11

## 2024-01-19 RX ADMIN — SENNA PLUS 2 TABLET(S): 8.6 TABLET ORAL at 22:55

## 2024-01-19 NOTE — ED ADULT NURSE NOTE - OBJECTIVE STATEMENT
pt presents to the ED for abnormal lab result. Pt states he was told to come into ED for IV abx after having +urine cultures. Pt had +UTI on Sunday. States he continues to have dysuria and burning on urination. Hx HTN, HIV+

## 2024-01-19 NOTE — PATIENT PROFILE ADULT - OVER THE PAST TWO WEEKS HAVE YOU FELT DOWN, DEPRESSED OR HOPELESS?
Pt here for rabies vaccine, bats found inside the house, no evidence of bites or breaks in the skin. Pt denies any complaints or pain at this time. no

## 2024-01-19 NOTE — ED ADULT TRIAGE NOTE - GLASGOW COMA SCALE: EYE OPENING, MLM
Continue present care no changes meds refilled.  
I rechecked her BP and it was in fact high at 172/88.  I am not making any changes, her BP fluctuates and she is taking Fludrocortisone prescribed by her cardiologist to increase her BP due to some issues she was having with low BP.   I will let them decide whether this needs to be discontinued.   
She had been taking potassium, ran out and it was not refilled so she decided to try eating bananas to keep her potassium up, however her potassium was low when it was checked last week so I am putting her back on her potassium and will have her get BW again next week when she comes in to see cardiology.   
This is stable and she is not requiring any inhalers.    
This remains stable and followed by cardiology, she has an appt with them next Tuesday.    
(E4) spontaneous

## 2024-01-19 NOTE — ED ADULT NURSE NOTE - CHIEF COMPLAINT QUOTE
seen in ED on sunday and dx with UTI. sent back to ED for positive urine culture and IV abx. pt c/o abdominal, left testicular pain.

## 2024-01-19 NOTE — H&P ADULT - ASSESSMENT
David Turcios is a 58 year old male with PMHx of HTN, HLD, anal CA (s/p chemotherapy, XRT, resection and colostomy creation in 3/2022), ureteral stricture (s/p b/l ureteral JJ stent with last exchange by Dr. Mathew in 12/2023), HIV, and anxiety who presented to the ED on 1/19/24 for being called back by ER and admitted for Pseudomonas UTI.    Pseudomonas UTI  Complaints of fevers since 1/3/24 with associated chronic dysuria and urinary frequency x 1 year  Evaluated in ER on 1/14/24, discharged home after feeling better with IV antibiotics and cleared by urology  Called back for urine culture +Pseudomonas  U/A with large blood, large leuks, +nitrites, WBC TNTC, RBC TNTC, many bacteria  S/p cefepime 1 g IV and acetaminophen 1 g IV in the ED  Cefepime 2 g q12 continued  F/u blood cultures, urine culture  Monitor fever curve and WBC trend    KATY, suspect etiology postrenal secondary to b/l hydronephrosis  In pt with recent b/l ureteral stent exchange in 12/2023  Cr 1.66 on admission, baseline Cr ~0.9-1.1  U/S renal with severe b/l hydronephrosis and b/l ureteral stents grossly in satisfactory position  U/S testicles with normal testicles and small b/l hydroceles  S/p 1L NS bolus in the ED  Avoid nephrotoxins, renally dose meds  Encourage PO hydration  F/u bladder scan, plan to straight cath if bladder scan > 300 cc  No current plans for surgical intervention as per urology  Can consider IR consult for possible nephrostomy tubes?  Urology recommendations appreciated    Thrombocytosis, likely reactive  Plts 484K on admission  No signs of bleeding  Monitor plts       Chronic medical conditions:  HTN: PTA metoprolol succinate 100 mg, diltiazem  mg  HLD: PTA ezetimibe 10 mg held given not on formulary  HIV: last viral load undetectable (on 12/11/23): PTA bictegravir / emtricitabine / tenofovir 50 / 200 / 25 mg  Anxiety: PTA alprazolam 1 mg q6 PRN  Chronic abdominal pain: outpatient palliative care notes reviewed,  checked, PTA morphine 15 mg q6 PRN  Opiate-induced constipation: PTA lactulose 10 g daily, senna 2 tabs qhs  Normocytic anemia, appears chronic: hgb 10.8 on admission, appears around baseline, no signs of bleeding, monitor hgb    Medication reconciliation completed using med list provided by patient.    Plan of care discussed with wife at bedside. David Turcios is a 58 year old male with PMHx of HTN, HLD, anal CA (s/p chemotherapy, XRT, resection and colostomy creation in 3/2022), ureteral stricture (s/p b/l ureteral JJ stent with last exchange by Dr. Mathew in 12/2023), HIV, and anxiety who presented to the ED on 1/19/24 for being called back by ER and admitted for Pseudomonas UTI.    Pseudomonas UTI  Complaints of fevers since 1/3/24 with associated chronic dysuria and urinary frequency x 1 year  Evaluated in ER on 1/14/24, discharged home after feeling better with IV antibiotics and cleared by urology  Called back for urine culture (on 1/14/24) > 100,000 CFU Pseudomonas  U/A with large blood, large leuks, +nitrites, WBC TNTC, RBC TNTC, many bacteria  S/p cefepime 1 g IV and acetaminophen 1 g IV in the ED  Cefepime 2 g q12 continued  F/u blood cultures, urine culture  Monitor fever curve and WBC trend    KATY, suspect etiology postrenal secondary to b/l hydronephrosis  In pt with recent b/l ureteral stent exchange in 12/2023  Cr 1.66 on admission, baseline Cr ~0.9-1.1  U/S renal with severe b/l hydronephrosis and b/l ureteral stents grossly in satisfactory position  U/S testicles with normal testicles and small b/l hydroceles  S/p 1L NS bolus in the ED  Avoid nephrotoxins, renally dose meds  Encourage PO hydration  F/u bladder scan, plan to straight cath if bladder scan > 300 cc  No current plans for surgical intervention as per urology  Can consider IR consult for possible nephrostomy tubes?  Urology recommendations appreciated    Thrombocytosis, likely reactive  Plts 484K on admission  No signs of bleeding  Monitor plts       Chronic medical conditions:  HTN: PTA metoprolol succinate 100 mg, diltiazem  mg  HLD: PTA ezetimibe 10 mg held given not on formulary  HIV: last viral load undetectable (on 12/11/23): PTA bictegravir / emtricitabine / tenofovir 50 / 200 / 25 mg  Anxiety: PTA alprazolam 1 mg q6 PRN  Chronic abdominal pain: outpatient palliative care notes reviewed,  checked, PTA morphine 15 mg q6 PRN  Opiate-induced constipation: PTA lactulose 10 g daily, senna 2 tabs qhs  Normocytic anemia, appears chronic: hgb 10.8 on admission, appears around baseline, no signs of bleeding, monitor hgb    Medication reconciliation completed using med list provided by patient.    Plan of care discussed with wife at bedside.

## 2024-01-19 NOTE — ED ADULT NURSE NOTE - NSFALLUNIVINTERV_ED_ALL_ED
Bed/Stretcher in lowest position, wheels locked, appropriate side rails in place/Call bell, personal items and telephone in reach/Instruct patient to call for assistance before getting out of bed/chair/stretcher/Non-slip footwear applied when patient is off stretcher/[place] to call system/Physically safe environment - no spills, clutter or unnecessary equipment/Purposeful proactive rounding/Room/bathroom lighting operational, light cord in reach

## 2024-01-19 NOTE — ED PROVIDER NOTE - PROGRESS NOTE DETAILS
Goodman DO: pain currently controlled, awaiting urology consult recommendations and admission Goodman DO: pt seen by urology, pending official consult, preliminary spoke w/ JOSUÉ who stated no acute intervention, pt requesting home medications, I stop  Reference #: [id number] confirms takes oxycodone 10mg every 4 hours and morphine 15mg ER every 12 hours , d/w Dr Brush for admission, pt well appearing

## 2024-01-19 NOTE — H&P ADULT - TIME BILLING
coordination of care with ER physician and urology PA, obtaining and reviewing history, performing a physical examination, explaining the diagnosis and treatment plan with patient and wife, completing medication reconciliation, interpreting and reviewing labs and imaging, and medical decision making.

## 2024-01-19 NOTE — H&P ADULT - NSHPPHYSICALEXAM_GEN_ALL_CORE
T(C): 36.9 (01-20-24 @ 00:01), Max: 37.2 (01-19-24 @ 22:25)  HR: 79 (01-20-24 @ 00:01) (73 - 94)  BP: 127/81 (01-20-24 @ 00:01) (104/75 - 142/94)  RR: 17 (01-20-24 @ 00:01) (17 - 18)  SpO2: 97% (01-20-24 @ 00:01) (96% - 97%)    CONSTITUTIONAL: Well groomed, no apparent distress  EYES: PERRLA and symmetric, EOMI  ENMT: Oral mucosa with moist membranes  RESP: No respiratory distress, no use of accessory muscles; CTA b/l  CV: RRR  GI: Soft, NT, ND, LLQ with colostomy bag present, stoma pink

## 2024-01-19 NOTE — CONSULT NOTE ADULT - SUBJECTIVE AND OBJECTIVE BOX
UROLOGY PA CONSULT NOTE:    CHIEF COMPLAINT:  Patient is a 58y old  Male who presents with a chief complaint of fevers    HPI:   Patient is a 58 year old male with PMHx Anal ca, prostate ca s/p radiation therapy, HTN, HLD, with a PSHx of Suarez's, stent placement and most recent stent exchange with Dr. Mathew on 2023, with recent ED evaluation on  for fevers.  At that time, patient was found to have UTI, given IV abx in the ED and assessed to be be stable for discharge home with outpatient antibiotics.  Patient evaluated with patient's wife.  Per patient's wife, upon leaving the ED the prescription was not properly sent to the pharmacy and patient was unable to start antibiotics.  He presented to Dr. Mathew's office for follow up on  and was given a script for antibiotics.  Patient received follow up call from initial ED evaluation, with urine culture results.    Patient reports since starting the outpatient antibiotics she has felt overall better since .        PAST MEDICAL HISTORY:  PAST MEDICAL & SURGICAL HISTORY:  HTN (hypertension)      HLD (hyperlipidemia)      HIV infection      Depressive disorder      Anxiety      Prostate cancer      Anal cancer      Diverticulosis      Lung cancer      Unspecified abdominal pain      Abdominal pain      BPH (benign prostatic hyperplasia)      Insomnia      Anal lesion      S/P colostomy      History of gastroscopy      Ureteral stent present          PAST SURGICAL HISTORY:    REVIEW OF SYSTEMS:  General/Constitutional: No acute distress, no headache, weakness, fevers, or chills   HEENT: Denies auditory or visual changes/disturbances, no vertigo, no throat pain, no dysphagia    Neck: Denies neck pain/stiffness, denies swelling/lumps/hoarseness   Lymphatic: Denies lumps or swelling in the axillae, groin, or neck bilaterally   Respiratory: Denies cough/hemoptysis, denies wheezing/SOB/dyspnea  Cardiac: Denies chest pain, palpitations  Abdomen: Denies abdominal bloating/fullness, nausea or vomiting, denies abdominal pain  Extremities: Denies sores, swelling, discoloration bilat UE/LE  Genitourinary: Denies urinary issues or complaints, denies dysuria/hematuria  Neuro: Denies weakness, paraesthesias, paralysis, syncope, loss of vision  Skin: Denies pruritus, pain, rashes  Psych: Denies hallucinations, visual disturbances, or depression    MEDICATIONS:  Home Medications:  ALPRAZolam 1 mg oral tablet: 1 tab(s) orally 3 times a day, As Needed (2024 10:47)  Ambien 10 mg oral tablet: 1 tab(s) orally once a day (at bedtime) (2024 10:47)  atorvastatin 10 mg oral tablet: 1 tab(s) orally once a day (2024 19:19)  bictegravir/emtricitabine/tenofovir 50 mg-200 mg-25 mg oral tablet: 1 tab(s) orally once a day (2024 10:47)  DilTIAZem (Eqv-Cardizem CD) 240 mg/24 hours oral capsule, extended release: 1 cap(s) orally once a day (2024 10:47)  ezetimibe 10 mg oral tablet: 1 tab(s) orally once a day (2024 10:47)  metoprolol succinate 100 mg oral tablet, extended release: 1 tab(s) orally once a day (2024 10:47)    MEDICATIONS  (STANDING):  atorvastatin 10 milliGRAM(s) Oral at bedtime  bictegravir 50 mG/emtricitabine 200 mG/tenofovir alafenamide 25 mG (BIKTARVY) 1 Tablet(s) Oral daily  cefepime   IVPB 2000 milliGRAM(s) IV Intermittent every 12 hours  diltiazem    milliGRAM(s) Oral daily  metoprolol succinate  milliGRAM(s) Oral daily  morphine ER Tablet 15 milliGRAM(s) Oral once  tamsulosin 0.4 milliGRAM(s) Oral at bedtime    MEDICATIONS  (PRN):  acetaminophen     Tablet .. 650 milliGRAM(s) Oral every 6 hours PRN Temp greater or equal to 38C (100.4F), Mild Pain (1 - 3)  ALPRAZolam 1 milliGRAM(s) Oral three times a day PRN anxiety  aluminum hydroxide/magnesium hydroxide/simethicone Suspension 30 milliLiter(s) Oral every 4 hours PRN Dyspepsia  melatonin 3 milliGRAM(s) Oral at bedtime PRN Insomnia  ondansetron Injectable 4 milliGRAM(s) IV Push every 8 hours PRN Nausea and/or Vomiting  oxyCODONE    IR 10 milliGRAM(s) Oral every 4 hours PRN Severe Pain (7 - 10)  zolpidem 10 milliGRAM(s) Oral at bedtime PRN Insomnia      ALLERGIES:  Allergies    No Known Allergies    Intolerances        SOCIAL HISTORY:  Social History:      FAMILY HISTORY:  FAMILY HISTORY:  FH: prostate cancer    FH: breast cancer        VITAL SIGNS:  Vital Signs Last 24 Hrs  T(C): 37.1 (2024 11:13), Max: 37.1 (2024 11:13)  T(F): 98.8 (2024 11:13), Max: 98.8 (2024 11:13)  HR: 94 (2024 11:13) (94 - 94)  BP: 104/75 (2024 11:13) (104/75 - 104/75)  BP(mean): --  RR: 18 (2024 11:13) (18 - 18)  SpO2: 97% (2024 11:13) (97% - 97%)    Parameters below as of 2024 11:13  Patient On (Oxygen Delivery Method): room air        PHYSICAL EXAM:  General: No acute distress, appears comfortable,  Chest: Lungs are clear to P&A, no wheezing, no rales, no ronchi, with good inspiratory effort  Heart: Heart rhythm regular, no murmurs  Abdomen: Soft, non tender, good bowel sounds present in all four quadrants.  No guarding, rebound, and no peritoneal signs.  +Colostomy in place with stool in bag.    Extremity: No swelling, or open sores, no gross deformities,  good range of motion, 2+ peripheral pulses bilat UE/LE, no edema,  negative Carmela's sign, no lymphadenopathy  Neuro: Alert and oriented x3, motor and sensory intact  Skin: Good color, turgor, texture with no gross lesions, no eruptions, no rashes, no subcutaneous nodules and normal temperature.     LABS:                        10.8   8.74  )-----------( 484      ( 2024 13:15 )             33.7     01-19    140  |  109<H>  |  16  ----------------------------<  103<H>  4.5   |  27  |  1.66<H>    Ca    9.3      2024 13:15    TPro  9.0<H>  /  Alb  3.1<L>  /  TBili  <0.1<L>  /  DBili  x   /  AST  7<L>  /  ALT  14  /  AlkPhos  96        Urinalysis Basic - ( 2024 13:27 )    Color: Orange / Appearance: Turbid / S.013 / pH: x  Gluc: x / Ketone: Negative mg/dL  / Bili: Negative / Urobili: 0.2 mg/dL   Blood: x / Protein: 100 mg/dL / Nitrite: Positive   Leuk Esterase: Large / RBC: Too Numerous to count /HPF / WBC Too Numerous to count /HPF   Sq Epi: x / Non Sq Epi: x / Bacteria: Many /HPF      LIVER FUNCTIONS - ( 2024 13:15 )  Alb: 3.1 g/dL / Pro: 9.0 gm/dL / ALK PHOS: 96 U/L / ALT: 14 U/L / AST: 7 U/L / GGT: x               RADIOLOGY & ADDITIONAL STUDIES:    ACC: [id number] EXAM:  US SCROTUM AND CONTENTS   ORDERED BY: CAROLINA AYERS     PROCEDURE DATE:  2024          INTERPRETATION:  CLINICAL INFORMATION: Persistent left testicular pain    COMPARISON: None available.    TECHNIQUE: Testicular ultrasound utilizing color and spectral Doppler.    FINDINGS:    RIGHT:  Right testis: 4.1 cm x 1.6 cm x 2.9 cm. Normal echogenicity and   echotexture with no masses or areas of architectural distortion. Normal   arterial and venous blood flow pattern.  Right epididymis: Within normal limits a side from a 6 mm simple cyst.  Right hydrocele: Small.  Right varicocele: None.    LEFT:  Left testis: 3.3 x 2.8 x 2.3 cm. Normal echogenicity and echotexture with   no masses or areas of architectural distortion. Normal arterial and   venous blood flow pattern.  Left epididymis: Within normal limits.  Left hydrocele: Small.  Left varicocele: None.    IMPRESSION:    Normal testicles.    Small bilateral hydroceles    SALOME BALTAZAR; Attending Radiologist  This document has been electronically signed. 2024  4:53PM      ACC: [id number] EXAM:  US KIDNEY(S)   ORDERED BY: CAROLINA AYERS     PROCEDURE DATE:  2024          INTERPRETATION:  CLINICAL INFORMATION: Bilateral renal stents.    COMPARISON: CT abdomen pelvis 2024.    TECHNIQUE: Sonography of the kidneys and bladder.    FINDINGS:  Right kidney: 14.4 cm. Severe hydronephrosis as noted on prior CT.   Proximal portion of ureteral stent visualized with tip in the renal   pelvis. A 5.8 cm simple upper pole cyst.    Left kidney: 14.8 cm. severe hydronephrosis. Proximal portion of a   ureteral stent is visualized with tip in the renal pelvis.. A 5.1 cm   simple upper pole cyst. A 1.4 cm lower pole simple cyst.    Urinary bladder: Distal portion of bilateral ureteral stents noted in the   bladder lumen. Prevoid bladder volume of 165 cc.. Bilateral ureteral jets   are seen.    IMPRESSION:  Severe bilateral hydronephrosis.  Bilateral ureteral stents grossly in satisfactory position. Correlate for   stent malfunction.    SALOME MISHRA MD; Attending Radiologist  This document has been electronically signed. 2024  3:33PM        ASSESSMENT:  57 Y/O male with a PMHx of Anal ca, prostate ca s/p radiation therapy, HTN, HLD, with a PSHx of Suarez's, stent placement and most recent stent exchange with Dr. Mathew on 2023, with recent evaluation in [facility] ED for fevers found to have a UTI and started on antibiotics.  Patient returns to ED for admission with IV Abx after urine culture resulted.      PLAN:  - IV abx  - Monitor UOP  - analgesia prn  - Discussed with Dr. Bautista  UROLOGY PA CONSULT NOTE:    CHIEF COMPLAINT:  Patient is a 58y old  Male who presents with a chief complaint of fevers    HPI:   Patient is a 58 year old male with PMHx Anal ca, prostate ca s/p radiation therapy, HTN, HLD, with a PSHx of Suarez's, stent placement and most recent stent exchange with Dr. Mathew on 2023, with recent ED evaluation on  for fevers.  At that time, patient was found to have UTI, given IV abx in the ED and assessed to be be stable for discharge home with outpatient antibiotics.  Patient evaluated with patient's wife.  Per patient's wife, upon leaving the ED the prescription was not properly sent to the pharmacy and patient was unable to start antibiotics.  He presented to Dr. Mathew's office for follow up on  and was given a script for antibiotics.  Patient received follow up call from initial ED evaluation, with urine culture results.    Patient reports feeling improved since arriving in the ED.        PAST MEDICAL HISTORY:  PAST MEDICAL & SURGICAL HISTORY:  HTN (hypertension)      HLD (hyperlipidemia)      HIV infection      Depressive disorder      Anxiety      Prostate cancer      Anal cancer      Diverticulosis      Lung cancer      Unspecified abdominal pain      Abdominal pain      BPH (benign prostatic hyperplasia)      Insomnia      Anal lesion      S/P colostomy      History of gastroscopy      Ureteral stent present          PAST SURGICAL HISTORY:    REVIEW OF SYSTEMS:  General/Constitutional: No acute distress, no headache, weakness, fevers, or chills   HEENT: Denies auditory or visual changes/disturbances, no vertigo, no throat pain, no dysphagia    Neck: Denies neck pain/stiffness, denies swelling/lumps/hoarseness   Lymphatic: Denies lumps or swelling in the axillae, groin, or neck bilaterally   Respiratory: Denies cough/hemoptysis, denies wheezing/SOB/dyspnea  Cardiac: Denies chest pain, palpitations  Abdomen: Denies abdominal bloating/fullness, nausea or vomiting, denies abdominal pain  Extremities: Denies sores, swelling, discoloration bilat UE/LE  Genitourinary: Denies urinary issues or complaints, denies dysuria/hematuria  Neuro: Denies weakness, paraesthesias, paralysis, syncope, loss of vision  Skin: Denies pruritus, pain, rashes  Psych: Denies hallucinations, visual disturbances, or depression    MEDICATIONS:  Home Medications:  ALPRAZolam 1 mg oral tablet: 1 tab(s) orally 3 times a day, As Needed (2024 10:47)  Ambien 10 mg oral tablet: 1 tab(s) orally once a day (at bedtime) (2024 10:47)  atorvastatin 10 mg oral tablet: 1 tab(s) orally once a day (2024 19:19)  bictegravir/emtricitabine/tenofovir 50 mg-200 mg-25 mg oral tablet: 1 tab(s) orally once a day (2024 10:47)  DilTIAZem (Eqv-Cardizem CD) 240 mg/24 hours oral capsule, extended release: 1 cap(s) orally once a day (2024 10:47)  ezetimibe 10 mg oral tablet: 1 tab(s) orally once a day (2024 10:47)  metoprolol succinate 100 mg oral tablet, extended release: 1 tab(s) orally once a day (2024 10:47)    MEDICATIONS  (STANDING):  atorvastatin 10 milliGRAM(s) Oral at bedtime  bictegravir 50 mG/emtricitabine 200 mG/tenofovir alafenamide 25 mG (BIKTARVY) 1 Tablet(s) Oral daily  cefepime   IVPB 2000 milliGRAM(s) IV Intermittent every 12 hours  diltiazem    milliGRAM(s) Oral daily  metoprolol succinate  milliGRAM(s) Oral daily  morphine ER Tablet 15 milliGRAM(s) Oral once  tamsulosin 0.4 milliGRAM(s) Oral at bedtime    MEDICATIONS  (PRN):  acetaminophen     Tablet .. 650 milliGRAM(s) Oral every 6 hours PRN Temp greater or equal to 38C (100.4F), Mild Pain (1 - 3)  ALPRAZolam 1 milliGRAM(s) Oral three times a day PRN anxiety  aluminum hydroxide/magnesium hydroxide/simethicone Suspension 30 milliLiter(s) Oral every 4 hours PRN Dyspepsia  melatonin 3 milliGRAM(s) Oral at bedtime PRN Insomnia  ondansetron Injectable 4 milliGRAM(s) IV Push every 8 hours PRN Nausea and/or Vomiting  oxyCODONE    IR 10 milliGRAM(s) Oral every 4 hours PRN Severe Pain (7 - 10)  zolpidem 10 milliGRAM(s) Oral at bedtime PRN Insomnia      ALLERGIES:  Allergies    No Known Allergies    Intolerances        SOCIAL HISTORY:  Social History:      FAMILY HISTORY:  FAMILY HISTORY:  FH: prostate cancer    FH: breast cancer        VITAL SIGNS:  Vital Signs Last 24 Hrs  T(C): 37.1 (2024 11:13), Max: 37.1 (2024 11:13)  T(F): 98.8 (2024 11:13), Max: 98.8 (2024 11:13)  HR: 94 (2024 11:13) (94 - 94)  BP: 104/75 (2024 11:13) (104/75 - 104/75)  BP(mean): --  RR: 18 (2024 11:13) (18 - 18)  SpO2: 97% (2024 11:13) (97% - 97%)    Parameters below as of 2024 11:13  Patient On (Oxygen Delivery Method): room air        PHYSICAL EXAM:  General: No acute distress, appears comfortable,  Chest: Lungs are clear to P&A, no wheezing, no rales, no ronchi, with good inspiratory effort  Heart: Heart rhythm regular, no murmurs  Abdomen: Soft, non tender, good bowel sounds present in all four quadrants.  No guarding, rebound, and no peritoneal signs.  +Colostomy in place with stool in bag.    Extremity: No swelling, or open sores, no gross deformities,  good range of motion, 2+ peripheral pulses bilat UE/LE, no edema,  negative Carmela's sign, no lymphadenopathy  Neuro: Alert and oriented x3, motor and sensory intact  Skin: Good color, turgor, texture with no gross lesions, no eruptions, no rashes, no subcutaneous nodules and normal temperature.     LABS:                        10.8   8.74  )-----------( 484      ( 2024 13:15 )             33.7     01-19    140  |  109<H>  |  16  ----------------------------<  103<H>  4.5   |  27  |  1.66<H>    Ca    9.3      2024 13:15    TPro  9.0<H>  /  Alb  3.1<L>  /  TBili  <0.1<L>  /  DBili  x   /  AST  7<L>  /  ALT  14  /  AlkPhos  96        Urinalysis Basic - ( 2024 13:27 )    Color: Orange / Appearance: Turbid / S.013 / pH: x  Gluc: x / Ketone: Negative mg/dL  / Bili: Negative / Urobili: 0.2 mg/dL   Blood: x / Protein: 100 mg/dL / Nitrite: Positive   Leuk Esterase: Large / RBC: Too Numerous to count /HPF / WBC Too Numerous to count /HPF   Sq Epi: x / Non Sq Epi: x / Bacteria: Many /HPF      LIVER FUNCTIONS - ( 2024 13:15 )  Alb: 3.1 g/dL / Pro: 9.0 gm/dL / ALK PHOS: 96 U/L / ALT: 14 U/L / AST: 7 U/L / GGT: x               RADIOLOGY & ADDITIONAL STUDIES:    ACC: [id number] EXAM:  US SCROTUM AND CONTENTS   ORDERED BY: CAROLINA AYERS     PROCEDURE DATE:  2024          INTERPRETATION:  CLINICAL INFORMATION: Persistent left testicular pain    COMPARISON: None available.    TECHNIQUE: Testicular ultrasound utilizing color and spectral Doppler.    FINDINGS:    RIGHT:  Right testis: 4.1 cm x 1.6 cm x 2.9 cm. Normal echogenicity and   echotexture with no masses or areas of architectural distortion. Normal   arterial and venous blood flow pattern.  Right epididymis: Within normal limits a side from a 6 mm simple cyst.  Right hydrocele: Small.  Right varicocele: None.    LEFT:  Left testis: 3.3 x 2.8 x 2.3 cm. Normal echogenicity and echotexture with   no masses or areas of architectural distortion. Normal arterial and   venous blood flow pattern.  Left epididymis: Within normal limits.  Left hydrocele: Small.  Left varicocele: None.    IMPRESSION:    Normal testicles.    Small bilateral hydroceles    SALOME BALTAZAR; Attending Radiologist  This document has been electronically signed. 2024  4:53PM      ACC: [id number] EXAM:  US KIDNEY(S)   ORDERED BY: CAROLINA AYERS     PROCEDURE DATE:  2024          INTERPRETATION:  CLINICAL INFORMATION: Bilateral renal stents.    COMPARISON: CT abdomen pelvis 2024.    TECHNIQUE: Sonography of the kidneys and bladder.    FINDINGS:  Right kidney: 14.4 cm. Severe hydronephrosis as noted on prior CT.   Proximal portion of ureteral stent visualized with tip in the renal   pelvis. A 5.8 cm simple upper pole cyst.    Left kidney: 14.8 cm. severe hydronephrosis. Proximal portion of a   ureteral stent is visualized with tip in the renal pelvis.. A 5.1 cm   simple upper pole cyst. A 1.4 cm lower pole simple cyst.    Urinary bladder: Distal portion of bilateral ureteral stents noted in the   bladder lumen. Prevoid bladder volume of 165 cc.. Bilateral ureteral jets   are seen.    IMPRESSION:  Severe bilateral hydronephrosis.  Bilateral ureteral stents grossly in satisfactory position. Correlate for   stent malfunction.    SALOME MISHRA MD; Attending Radiologist  This document has been electronically signed. 2024  3:33PM        ASSESSMENT:  57 Y/O male with a PMHx of Anal ca, prostate ca s/p radiation therapy, HTN, HLD, with a PSHx of Suarez's, stent placement and most recent stent exchange with Dr. Mathew on 2023, with recent evaluation in [facility] ED for fevers found to have a UTI and started on antibiotics.  Patient returns to ED for admission with IV Abx after urine culture resulted.      PLAN:  - IV abx  - Monitor UOP  - analgesia prn  - Discussed with Dr. Bautista

## 2024-01-19 NOTE — CHART NOTE - NSCHARTNOTEFT_GEN_A_CORE
Confidential Drug Utilization Report  Search Terms: David Turcios, 1965Search Date: 01/19/2024 21:23:49 PM  Searching on behalf of: Myself  The Drug Utilization Report below displays all of the controlled substance prescriptions, if any, that your patient has filled in the last twelve months. The information displayed on this report is compiled from pharmacy submissions to the Department, and accurately reflects the information as submitted by the pharmacies.    This report was requested by: Sonia Brush | Reference #: [id number]    You have not added a NEFTALY number. Keeping your NEFTALY number(s) up to date on the My NEFTALY # tab will enable the separation of your prescriptions from others in the search results.    Practitioner Count: 3  Pharmacy Count: 1  Current Opioid Prescriptions: 1  Current Benzodiazepine Prescriptions: 1  Current Stimulant Prescriptions: 0      Patient Demographic Information (PDI)       PDI	First Name	Last Name	Birth Date	Gender	Street Address	[facility]  A	David Turcios	1965	Male	[address] [facility] [address]	[facility]	[id number]  B	David uTrcios	1965	Unknown	[address] [facility]	[address]  C	David Turcios	1965	Male	[address] [facility]	[address]  D	David Turcios	1965	Unknown	[address] [facility]	[id number]    Prescription Information      PDI Filter:    PDI	Current Rx	Drug Type	Rx Written	Rx Dispensed	Drug	Quantity	Days Supply	Prescriber Name	Prescriber NEFTALY #	Payment Method	Dispenser  A	N		08/15/2023	08/15/2023	pregabalin 75 mg capsule	60	30	Nick Reyes MD	[id number]	Medicaid	[facility]  A	N	O	08/07/2023	08/07/2023	oxycodone-acetaminophen 5-325 mg tablet	42	14	Andres Smith	[id number]	Medicaid	[facility]  B	N		02/01/2023	02/09/2023	vireo hicolor dream blackberry chew 10mg thc:10mg cbn/chew	1	3	Naeem Tucker (MSN)	[id number]	[facility]  B	N		02/01/2023	02/09/2023	vireo whole flower 0.0 mg - 1mg thc:<0.5mg cbd/inh	1	3	Naeem Tucker (MSN)	[id number]	[facility]  B	N		02/01/2023	02/09/2023	vireo whole flower 0.0 mg - 1mg thc:<0.5mg cbd/inh	1	3	Naeem Tucker (MSN)	[id number]	[facility]  C	Y		09/26/2023	01/04/2024	zolpidem tartrate 10 mg tablet	30	30	Hanh Yin	[id number]	Medicaid	[id number]  C	Y	O	12/20/2023	12/23/2023	morphine sulf er 15 mg tablet	120	30	Tasha Perez	[id number]	Medicaid	[id number]  C	N	O	12/20/2023	12/20/2023	oxycodone hcl (ir) 10 mg tab	90	15	Tasha Perez	[id number]	Medicaid	[id number]  C	Y	B	12/20/2023	12/20/2023	alprazolam 1 mg tablet	120	30	Hanh Yin	[id number]	Medicaid	[id number]  C	N		09/26/2023	12/04/2023	zolpidem tartrate 10 mg tablet	30	30	Hanh Yin	[id number]	Medicaid	[id number]  C	N	O	11/06/2023	11/15/2023	morphine sulf er 15 mg tablet	90	30	Patricia Colon)	[id number]	Medicaid	[id number]  C	N	B	11/14/2023	11/14/2023	alprazolam 1 mg tablet	120	30	Hanh Yin	[id number]	Medicaid	[id number]  C	N	O	11/06/2023	11/09/2023	oxycodone hcl (ir) 10 mg tab	90	15	Patricia Colon)	[id number]	Medicaid	[id number]  C	N	O	10/26/2023	10/27/2023	oxycodone hcl (ir) 10 mg tab	60	10	Patricia Colon)	[id number]	Medicaid	[id number]  C	N		09/26/2023	10/24/2023	zolpidem tartrate 10 mg tablet	30	30	Hanh Yin	[id number]	Medicaid	[id number]  C	N	O	10/17/2023	10/17/2023	oxycodone hcl (ir) 5 mg tablet	42	7	Jarad Schwartz	[id number]	Medicaid	[id number]  C	N		09/28/2023	10/01/2023	pregabalin 150 mg capsule	60	30	Nick Reyes MD	[id number]	Medicaid	[id number]  C	N		09/26/2023	09/27/2023	zolpidem tartrate 10 mg tablet	30	30	Hanh Yin	[id number]	Medicaid	[id number]  C	N	B	09/26/2023	09/26/2023	alprazolam 1 mg tablet	120	30	Hanh Yin	[id number]	Medicaid	[id number]  C	N	O	09/14/2023	09/14/2023	oxycodone hcl (ir) 5 mg tablet	6	2	Nick Mendes (MD)	[id number]	Medicaid	[id number]  C	N		03/22/2023	08/24/2023	zolpidem tartrate 10 mg tablet	30	30	Hanh Yin	[id number]	Medicaid	[id number]  C	N	B	08/17/2023	08/18/2023	alprazolam 1 mg tablet	120	30	Hanh Yin	[id number]	Medicaid	[id number]  C	N		02/16/2023	07/24/2023	zolpidem tartrate 10 mg tablet	30	30	AnnamariaHanh medina	[id number]	Medicaid	[id number]  C	N		12/28/2022	06/15/2023	zolpidem tartrate 10 mg tablet	30	30	Hanh Yin	[id number]	Medicaid	[id number]  C	N	B	06/13/2023	06/13/2023	alprazolam 1 mg tablet	120	30	Hanh Yin	[id number]	Medicaid	[id number]  C	N	O	06/09/2023	06/09/2023	tramadol hcl 50 mg tablet	30	30	Jarad Schwartz	[id number]	Medicaid	[id number]  C	N	O	05/19/2023	05/19/2023	oxycodone-acetaminophen 5-325 mg tablet	9	3	Jarad Schwartz	[id number]	Medicaid	[id number]  C	N		03/22/2023	05/17/2023	zolpidem tartrate 10 mg tablet	30	30	AnnamariaHanh feldman	[id number]	Medicaid	[id number]  C	N	B	05/05/2023	05/05/2023	alprazolam 1 mg tablet	120	30	AnnamariaEric feldmana	[id number]	Medicaid	[id number]  C	N		03/22/2023	04/19/2023	zolpidem tartrate 10 mg tablet	30	30	AnnamariaErica	[id number]	Medicaid	[id number]  C	N		03/22/2023	03/22/2023	zolpidem tartrate 10 mg tablet	30	30	AnnamariaErica	[id number]	Medicaid	[id number]  C	N	B	02/16/2023	03/05/2023	alprazolam 1 mg tablet	120	30	AnnamariaErica	[id number]	Medicaid	[id number]  C	N		02/16/2023	02/17/2023	zolpidem tartrate 10 mg tablet	30	30	Annamaria Hanh	[id number]	Medicaid	[id number]  C	N	B	02/02/2023	02/04/2023	alprazolam 1 mg tablet	120	30	AnnamariaErica	[id number]	Medicaid	[id number]  D	N		02/01/2023	03/03/2023	vireo whole flower 0.0 mg - 1mg thc:<0.5mg cbd/inh	1	3	Naeem Tucker (MSN)	[id number]	[facility]  D	N		02/01/2023	03/03/2023	vireo whole flower 0.0 mg - 1mg thc:<0.5mg cbd/inh	1	3	Naeem Tucker (MSN)	[id number]	[facility]  D	N		02/01/2023	03/03/2023	vireo whole flower 0.0 mg - 1mg thc:<0.5mg cbd/inh	1	3	Naeem Tucker (MSN)	[id number]	[facility]  D	N		02/01/2023	03/03/2023	vireo whole flower 0.0 mg - 1mg thc:<0.5mg cbd/inh	1	3	[facility]	[id number]	[facility]  [facility]	N		02/01/2023	02/20/2023	vireo hicolor chews 10mg thc:10mg cbd/chew sully	1	3	Naeem Tucker (MSN)	[id number]	[facility]  D	N		02/01/2023	02/20/2023	vireo whole flower 0.0 mg - 1mg thc:<0.5mg cbd/inh	1	3	Naeem Tucker (MSN)	[id number]	[facility]  D	N		02/01/2023	02/20/2023	vireo whole flower 0.0 mg - 1mg thc:<0.5mg cbd/inh	1	3	Naeem Tucker (MSN)	[id number]	[facility]  D	N		02/01/2023	02/20/2023	vireo whole flower 0.0 mg - 1mg thc:<0.5mg cbd/inh	1	3	Naeem Tucker (MSN)	[id number]	[facility]  D	N		02/01/2023	02/20/2023	vireo whole flower 0.0 mg - 1mg thc:<0.5mg cbd/inh	1	3	Naeem Tucker (MSN)	[id number]	[facility]    * - Details of Drug Type : O = Opioid, B = Benzodiazepine, S = Stimulant    * - Drugs marked with an asterisk are compound drugs. If the compound drug is made up of more than one controlled substance, then each controlled substance will be a separate row in the table.

## 2024-01-19 NOTE — H&P ADULT - HISTORY OF PRESENT ILLNESS
David Turcios is a 58 year old male with PMHx of HTN, HLD, anal CA (s/p chemotherapy, XRT, resection and colostomy creation in 3/2022), ureteral stricture (s/p b/l ureteral JJ stent with last exchange by Dr. Mathew in 12/2023), HIV, and anxiety who presented to the ED on 1/19/24 for being called back by ER.    Patient reports he has been having persistent fevers at home since 1/3/24. Also complaints of dysuria and urinary frequency for the past year so he did not think anything of his urinary symptoms. However, he procrastinated because he did not want to come to the ER as he is frequently hospitalized and wanted to stay home if possible. Came to ER on 1/14/24. Found to have fever, UTI, and moderate bilateral hydronephrosis on CT. U/S testicles normal at that time. Evaluated by urology. Symptoms improved after IV antibiotics and discharged home on cefpodoxime 200 mg BID. Patient reports his insurance requires a pre-authorization for cefpodoxime so he was unable to pick it up from [facility] pharmacy. Went to see urologist, Dr. Mathew on 1/16/24 and wife states they stayed in his office until "any antibiotic was sent to [facility] and it was confirmed." Started on Bactrim but with persistent symptoms. Last night, received a phone call from someone in the ER advising him to come back as his urine culture returned for Pseudomonas.     In the ED, VSS. Hgb 10.8, plts 484K, Cr 1.66. U/A with large blood, large leuks, +nitrites, WBC TNTC, RBC TNTC, many bacteria. U/S renal with severe b/l hydronephrosis and b/l ureteral stents grossly in satisfactory position. U/S testicles with normal testicles and small b/l hydroceles. Received 1L NS bolus, cefepime 1 g IV, acetaminophen 1 g IV. Evaluated by urology who recommends IV antibiotics.  yes

## 2024-01-19 NOTE — ED PROVIDER NOTE - PHYSICAL EXAMINATION
Gen: AOx3, NAD  Head: NCAT  ENT: Airway patent, dry mucous membranes, nasal passageways clear   Cardiac: Normal rate, normal rhythm,   Respiratory: Lungs CTA B/L  Gastrointestinal: Abdomen soft, mild TTP LLQ, nondistended, no rebound, no guarding  MSK: No gross abnormalities, FROM of all four extremities, no edema  HEME: Extremities warm and well perfused   Skin: No rashes, no lesions  Neuro: No gross neurologic deficits, normal speech, no gait abnormality

## 2024-01-19 NOTE — ED PROVIDER NOTE - CLINICAL SUMMARY MEDICAL DECISION MAKING FREE TEXT BOX
58M pmhx DM, HTN, HLD, HIV+, anal cancer sp resection with colostomy in place, , ureteral stricture w/ hx b/l ureteral JJ stent placement (last exchange dec 2023 with Dr Mathew), who presents for persistent LLQ pain, dysuria, left testicular pain ongoing since evaluation in ED 5 days ago (1/14/24) where he was found to have fever, UTI, and moderate b/l hydronephrosis on CT and US testicles wnl - seen by urology and improved after IV abx and discharged. Pt states he had difficulty obtaining cefpodoxime that was sent to his pharmacy so had no abx for 3 days, then he was started on bactrim x 2 days by urologist, however symptoms are not improving. Denies persistent fever but has had fatigue, loss of appetite and persistent urinary discomfort. He was called back due to urine culture with pseudomonas with intermediate resistance to oral fluoroquinolone ciprofloxacin/levaquin  - due to persistent symptoms, will consider obs vs admission for IV abx, repeat UA/ UCx, bcx as pt appears fatigued, has indwelling ureteral stents and went without abx for 3 days, eval for metabolic derangements, continue home pain regimen,

## 2024-01-19 NOTE — ED PROVIDER NOTE - OBJECTIVE STATEMENT
58M pmhx DM, HTN, HLD, HIV+, anal cancer sp resection with colostomy in place, , ureteral stricture w/ hx b/l ureteral JJ stent placement (last exchange dec 2023 with Dr Mathew), who presents for persistent LLQ pain, dysuria, left testicular pain ongoing since evaluation in ED 5 days ago (1/14/24) where he was found to have fever, UTI, and moderate b/l hydronephrosis on CT and US testicles wnl - seen by urology and improved after IV abx and discharged. Pt states he had difficulty obtaining cefpodoxime that was sent to his pharmacy so had no abx for 3 days, then he was started on bactrim x 2 days by urologist, however symptoms are not improving. Denies persistent fever but has had fatigue, loss of appetite and persistent urinary discomfort. He was called back due to urine culture with pseudomonas with intermediate resistance to oral fluoroquinolone ciprofloxacin/levaquin

## 2024-01-19 NOTE — H&P ADULT - NSICDXPASTMEDICALHX_GEN_ALL_CORE_FT
PAST MEDICAL HISTORY:  Anxiety     BPH (benign prostatic hyperplasia)     History of anal cancer     HIV infection     HLD (hyperlipidemia)     HTN (hypertension)

## 2024-01-19 NOTE — H&P ADULT - NSHPLABSRESULTS_GEN_ALL_CORE
10.8   8.74  )-----------( 484      ( 2024 13:15 )             33.7       140  |  109<H>  |  16  ----------------------------<  103<H>  4.5   |  27  |  1.66<H>    Ca    9.3      2024 13:15    TPro  9.0<H>  /  Alb  3.1<L>  /  TBili  <0.1<L>  /  DBili  x   /  AST  7<L>  /  ALT  14  /  AlkPhos  96      Urinalysis Basic - ( 2024 13:27 )    Color: Orange / Appearance: Turbid / S.013 / pH: x  Gluc: x / Ketone: Negative mg/dL  / Bili: Negative / Urobili: 0.2 mg/dL   Blood: x / Protein: 100 mg/dL / Nitrite: Positive   Leuk Esterase: Large / RBC: Too Numerous to count /HPF / WBC Too Numerous to count /HPF   Sq Epi: x / Non Sq Epi: x / Bacteria: Many /HPF    U/S renal 24  IMPRESSION:  Severe bilateral hydronephrosis.  Bilateral ureteral stents grossly in satisfactory position. Correlate for stent malfunction.    U/S testicles 24  IMPRESSION:  Normal testicles.  Small bilateral hydroceles

## 2024-01-20 DIAGNOSIS — B20 HUMAN IMMUNODEFICIENCY VIRUS [HIV] DISEASE: ICD-10-CM

## 2024-01-20 DIAGNOSIS — N39.0 URINARY TRACT INFECTION, SITE NOT SPECIFIED: ICD-10-CM

## 2024-01-20 DIAGNOSIS — I10 ESSENTIAL (PRIMARY) HYPERTENSION: ICD-10-CM

## 2024-01-20 DIAGNOSIS — D64.9 ANEMIA, UNSPECIFIED: ICD-10-CM

## 2024-01-20 DIAGNOSIS — F41.9 ANXIETY DISORDER, UNSPECIFIED: ICD-10-CM

## 2024-01-20 DIAGNOSIS — R10.9 UNSPECIFIED ABDOMINAL PAIN: ICD-10-CM

## 2024-01-20 DIAGNOSIS — E78.5 HYPERLIPIDEMIA, UNSPECIFIED: ICD-10-CM

## 2024-01-20 DIAGNOSIS — N17.9 ACUTE KIDNEY FAILURE, UNSPECIFIED: ICD-10-CM

## 2024-01-20 DIAGNOSIS — N13.30 UNSPECIFIED HYDRONEPHROSIS: ICD-10-CM

## 2024-01-20 DIAGNOSIS — D75.839 THROMBOCYTOSIS, UNSPECIFIED: ICD-10-CM

## 2024-01-20 LAB
ANION GAP SERPL CALC-SCNC: 6 MMOL/L — SIGNIFICANT CHANGE UP (ref 5–17)
APPEARANCE: ABNORMAL
BACTERIA UR CULT: ABNORMAL
BACTERIA: ABNORMAL /HPF
BILIRUBIN URINE: NEGATIVE
BLOOD URINE: ABNORMAL
BUN SERPL-MCNC: 11 MG/DL — SIGNIFICANT CHANGE UP (ref 7–23)
CALCIUM SERPL-MCNC: 9 MG/DL — SIGNIFICANT CHANGE UP (ref 8.5–10.1)
CAST: 1 /LPF
CHLORIDE SERPL-SCNC: 110 MMOL/L — HIGH (ref 96–108)
CO2 SERPL-SCNC: 24 MMOL/L — SIGNIFICANT CHANGE UP (ref 22–31)
COLOR: YELLOW
CREAT SERPL-MCNC: 1 MG/DL — SIGNIFICANT CHANGE UP (ref 0.5–1.3)
EGFR: 88 ML/MIN/1.73M2 — SIGNIFICANT CHANGE UP
EPITHELIAL CELLS: 1 /HPF
GLUCOSE QUALITATIVE U: NEGATIVE MG/DL
GLUCOSE SERPL-MCNC: 97 MG/DL — SIGNIFICANT CHANGE UP (ref 70–99)
HCT VFR BLD CALC: 33.2 % — LOW (ref 39–50)
HGB BLD-MCNC: 10.7 G/DL — LOW (ref 13–17)
KETONES URINE: NEGATIVE MG/DL
LEUKOCYTE ESTERASE URINE: ABNORMAL
MCHC RBC-ENTMCNC: 28.4 PG — SIGNIFICANT CHANGE UP (ref 27–34)
MCHC RBC-ENTMCNC: 32.2 G/DL — SIGNIFICANT CHANGE UP (ref 32–36)
MCV RBC AUTO: 88.1 FL — SIGNIFICANT CHANGE UP (ref 80–100)
MICROSCOPIC-UA: NORMAL
NITRITE URINE: POSITIVE
NRBC # BLD: 0 /100 WBCS — SIGNIFICANT CHANGE UP (ref 0–0)
PH URINE: 6
PLATELET # BLD AUTO: 448 K/UL — HIGH (ref 150–400)
POTASSIUM SERPL-MCNC: 3.9 MMOL/L — SIGNIFICANT CHANGE UP (ref 3.5–5.3)
POTASSIUM SERPL-SCNC: 3.9 MMOL/L — SIGNIFICANT CHANGE UP (ref 3.5–5.3)
PROTEIN URINE: 100 MG/DL
RBC # BLD: 3.77 M/UL — LOW (ref 4.2–5.8)
RBC # FLD: 13.6 % — SIGNIFICANT CHANGE UP (ref 10.3–14.5)
RED BLOOD CELLS URINE: 435 /HPF
REVIEW: NORMAL
SODIUM SERPL-SCNC: 140 MMOL/L — SIGNIFICANT CHANGE UP (ref 135–145)
SPECIFIC GRAVITY URINE: 1.02
UROBILINOGEN URINE: 0.2 MG/DL
WBC # BLD: 5.21 K/UL — SIGNIFICANT CHANGE UP (ref 3.8–10.5)
WBC # FLD AUTO: 5.21 K/UL — SIGNIFICANT CHANGE UP (ref 3.8–10.5)
WBC CLUMPS: PRESENT
WHITE BLOOD CELLS URINE: >998 /HPF
YEAST-LIKE CELLS: PRESENT

## 2024-01-20 PROCEDURE — 99232 SBSQ HOSP IP/OBS MODERATE 35: CPT

## 2024-01-20 RX ORDER — MORPHINE SULFATE 50 MG/1
2 CAPSULE, EXTENDED RELEASE ORAL EVERY 4 HOURS
Refills: 0 | Status: DISCONTINUED | OUTPATIENT
Start: 2024-01-20 | End: 2024-01-26

## 2024-01-20 RX ADMIN — LACTULOSE 10 GRAM(S): 10 SOLUTION ORAL at 14:36

## 2024-01-20 RX ADMIN — Medication 240 MILLIGRAM(S): at 05:14

## 2024-01-20 RX ADMIN — MORPHINE SULFATE 15 MILLIGRAM(S): 50 CAPSULE, EXTENDED RELEASE ORAL at 00:38

## 2024-01-20 RX ADMIN — CEFEPIME 100 MILLIGRAM(S): 1 INJECTION, POWDER, FOR SOLUTION INTRAMUSCULAR; INTRAVENOUS at 05:15

## 2024-01-20 RX ADMIN — MORPHINE SULFATE 15 MILLIGRAM(S): 50 CAPSULE, EXTENDED RELEASE ORAL at 09:39

## 2024-01-20 RX ADMIN — ZOLPIDEM TARTRATE 10 MILLIGRAM(S): 10 TABLET ORAL at 21:21

## 2024-01-20 RX ADMIN — MORPHINE SULFATE 15 MILLIGRAM(S): 50 CAPSULE, EXTENDED RELEASE ORAL at 10:39

## 2024-01-20 RX ADMIN — CEFEPIME 100 MILLIGRAM(S): 1 INJECTION, POWDER, FOR SOLUTION INTRAMUSCULAR; INTRAVENOUS at 18:42

## 2024-01-20 RX ADMIN — Medication 100 MILLIGRAM(S): at 05:14

## 2024-01-20 RX ADMIN — BICTEGRAVIR SODIUM, EMTRICITABINE, AND TENOFOVIR ALAFENAMIDE FUMARATE 1 TABLET(S): 30; 120; 15 TABLET ORAL at 14:37

## 2024-01-20 NOTE — PROGRESS NOTE ADULT - SUBJECTIVE AND OBJECTIVE BOX
Patient seen and  examined at bedside resting comfortably.   Offers no complaints, voiding well on own. PVR 0  Denies fever, chills, N/V/D, CP, SOB, suprapubic discomfort.     Vital Signs Last 24 Hrs  T(F): 98 (01-20-24 @ 11:30), Max: 99 (01-19-24 @ 22:25)  HR: 77 (01-20-24 @ 11:30)  BP: 156/65 (01-20-24 @ 11:30)  RR: 18 (01-20-24 @ 11:30)  SpO2: 98% (01-20-24 @ 11:30)    PHYSICAL EXAM:  GENERAL: Alert, NAD  CHEST/LUNG: Clear to auscultation bilaterally, respirations nonlabored  HEART: Regular rate and rhythm; S1 & S2 appreciated  ABDOMEN: + Bowel sounds, soft, Nontender, Nondistended  : no suprapubic tenderness or distention noted   EXTREMITIES:  no calf tenderness, No edema    I&O's Detail      LABS:                        10.7   5.21  )-----------( 448      ( 20 Jan 2024 07:50 )             33.2     01-20    140  |  110<H>  |  11  ----------------------------<  97  3.9   |  24  |  1.00    Ca    9.0      20 Jan 2024 07:50    TPro  9.0<H>  /  Alb  3.1<L>  /  TBili  <0.1<L>  /  DBili  x   /  AST  7<L>  /  ALT  14  /  AlkPhos  96  01-19        RADIOLOGY & ADDITIONAL STUDIES:    A/P  57 Y/O male with a PMHx of Anal ca, prostate ca s/p radiation therapy, HTN, HLD, with a PSHx of Suarez's, stent placement and most recent stent exchange with Dr. Mathew on 12/13/2023, with recent evaluation in [facility] ED for fevers found to have a UTI and started on antibiotics.  Patient returns to ED for admission with IV Abx after urine culture resulted.    Found with small left hydrocele and severe bilateral hydronephrosis on US. Bilateral ureteral stents in place   No leukocytosis, KATY resolved.   Pre peacock Ucx: GNR     - f/u final C&S, continue IV abx  - monitor UOP, voiding well on own   - continue care per primary team   - will d/w urologist

## 2024-01-20 NOTE — PROGRESS NOTE ADULT - ASSESSMENT
58 year old male with PMHx of HTN, HLD, anal CA (s/p chemotherapy, XRT, resection and colostomy creation in 3/2022), ureteral stricture (s/p b/l ureteral JJ stent with last exchange by Dr. Mathew in 12/2023), HIV, and anxiety who presented to the ED on 1/19/24 for being called back by ER and admitted for Pseudomonas UTI.    Pseudomonas UTI  Complaints of fevers since 1/3/24 with associated chronic dysuria and urinary frequency x 1 year  Evaluated in ER on 1/14/24, discharged home after feeling better with IV antibiotics and cleared by urology  Called back for urine culture (on 1/14/24) > 100,000 CFU Pseudomonas  U/A with large blood, large leuks, +nitrites, WBC TNTC, RBC TNTC, many bacteria  S/p cefepime 1 g IV and acetaminophen 1 g IV in the ED  Cefepime 2 g q12 continued  F/u blood cultures, urine culture  Monitor fever curve and WBC trend    KATY, suspect etiology postrenal secondary to b/l hydronephrosis  In pt with recent b/l ureteral stent exchange in 12/2023  Cr 1.66 on admission, baseline Cr ~0.9-1.1  U/S renal with severe b/l hydronephrosis and b/l ureteral stents grossly in satisfactory position  U/S testicles with normal testicles and small b/l hydroceles  S/p 1L NS bolus in the ED  Avoid nephrotoxins, renally dose meds  bladder scan PVR 0cc   Urology following     Thrombocytosis, likely reactive  Plts 484K on admission  No signs of bleeding or bruising   Monitor plts       Chronic medical conditions:  HTN: PTA metoprolol succinate 100 mg, diltiazem  mg  HLD: PTA ezetimibe 10 mg held given not on formulary  HIV: last viral load undetectable (on 12/11/23): PTA bictegravir / emtricitabine / tenofovir 50 / 200 / 25 mg  Anxiety: PTA alprazolam 1 mg q6 PRN  Chronic abdominal pain: outpatient palliative care notes reviewed,  checked, PTA morphine 15 mg q6 PRN  Opiate-induced constipation: PTA lactulose 10 g daily, senna 2 tabs qhs  Normocytic anemia, appears chronic: hgb 10.8 on admission, appears around baseline, no signs of bleeding, monitor hgb    Medication reconciliation completed using med list provided by patient.

## 2024-01-20 NOTE — PROGRESS NOTE ADULT - SUBJECTIVE AND OBJECTIVE BOX
PROGRESS NOTE:     Patient is a 58y old  Male who presents with a chief complaint of Pseudomonas UTI, KATY (20 Jan 2024 16:41)        SUBJECTIVE & OBJECTIVE:   Pt seen and examined at bedside in AM    no overnight events.   no new complaints    REVIEW OF SYSTEMS: remaining ROS negative     PHYSICAL EXAM:  T(C): 36.8 (01-20-24 @ 17:24), Max: 36.9 (01-20-24 @ 00:01)  HR: 88 (01-20-24 @ 17:24) (77 - 88)  BP: 112/79 (01-20-24 @ 17:24) (112/79 - 156/65)  RR: 18 (01-20-24 @ 17:24) (17 - 18)  SpO2: 96% (01-20-24 @ 17:24) (96% - 98%)  Wt(kg): --     I&O's Detail    20 Jan 2024 07:01  -  20 Jan 2024 23:14  --------------------------------------------------------  IN:    Oral Fluid: 455 mL  Total IN: 455 mL    OUT:  Total OUT: 0 mL    Total NET: 455 mL          20 Jan 2024 07:01  -  20 Jan 2024 23:14  --------------------------------------------------------  IN:    Oral Fluid: 455 mL  Total IN: 455 mL    OUT:  Total OUT: 0 mL    Total NET: 455 mL          GENERAL: NAD, no increased WOB  HEAD:  Atraumatic, Normocephalic  EYES: EOMI, PERRLA, conjunctiva and sclera clear  ENMT: Moist mucous membranes  NECK: Supple, No JVD  NERVOUS SYSTEM:  Alert & Oriented X3, no focal neuro deficits   CHEST/LUNG: Clear to auscultation bilaterally; No rales, rhonchi, wheezing, or rubs  HEART: Regular rate and rhythm; No murmurs, rubs, or gallops  ABDOMEN: Soft, Nontender, Nondistended; Bowel sounds present; + Suprapubic pain on palpation   EXTREMITIES:  2+ Peripheral Pulses b/l, No clubbing, cyanosis, calf tenderness or edema b/l    MEDICATIONS  (STANDING):  bictegravir 50 mG/emtricitabine 200 mG/tenofovir alafenamide 25 mG (BIKTARVY) 1 Tablet(s) Oral daily  cefepime   IVPB 2000 milliGRAM(s) IV Intermittent every 12 hours  diltiazem    milliGRAM(s) Oral daily  lactulose Syrup 10 Gram(s) Oral daily  metoprolol succinate  milliGRAM(s) Oral daily  senna 2 Tablet(s) Oral at bedtime    MEDICATIONS  (PRN):  acetaminophen     Tablet .. 650 milliGRAM(s) Oral every 6 hours PRN Temp greater or equal to 38C (100.4F), Mild Pain (1 - 3)  ALPRAZolam 1 milliGRAM(s) Oral every 6 hours PRN anxiety  aluminum hydroxide/magnesium hydroxide/simethicone Suspension 30 milliLiter(s) Oral every 4 hours PRN Dyspepsia  melatonin 3 milliGRAM(s) Oral at bedtime PRN Insomnia  morphine  - Injectable 2 milliGRAM(s) IV Push every 4 hours PRN for breakthrough pain  ondansetron Injectable 4 milliGRAM(s) IV Push every 8 hours PRN Nausea and/or Vomiting  oxycodone    5 mG/acetaminophen 325 mG 2 Tablet(s) Oral every 6 hours PRN Severe Pain (7 - 10)  zolpidem 10 milliGRAM(s) Oral at bedtime PRN Insomnia      LABS:                        10.7   5.21  )-----------( 448      ( 20 Jan 2024 07:50 )             33.2     01-20    140  |  110<H>  |  11  ----------------------------<  97  3.9   |  24  |  1.00    Ca    9.0      20 Jan 2024 07:50    TPro  9.0<H>  /  Alb  3.1<L>  /  TBili  <0.1<L>  /  DBili  x   /  AST  7<L>  /  ALT  14  /  AlkPhos  96  01-19      Urinalysis Basic - ( 20 Jan 2024 07:50 )    Color: x / Appearance: x / SG: x / pH: x  Gluc: 97 mg/dL / Ketone: x  / Bili: x / Urobili: x   Blood: x / Protein: x / Nitrite: x   Leuk Esterase: x / RBC: x / WBC x   Sq Epi: x / Non Sq Epi: x / Bacteria: x        CAPILLARY BLOOD GLUCOSE                RECENT CULTURES:  Urine culture:  01-19 @ 13:27 --   10,000 - 49,000 CFU/mL Gram Negative Rods  Urine culture:  01-19 @ 13:15 --   No growth at 24 hours  Urine culture:  01-19 @ 13:05 --   No growth at 24 hours    Clean Catch Clean Catch (Midstream)  01-19 @ 13:27   10,000 - 49,000 CFU/mL Gram Negative Rods  --  --      .Blood Blood-Peripheral  01-19 @ 13:15   No growth at 24 hours  --  --      .Blood Blood-Venous  01-19 @ 13:05   No growth at 24 hours  --  --      .Blood Blood-Peripheral  01-14 @ 11:25   No growth at 5 days  --  --      .Blood Blood-Peripheral  01-14 @ 11:16   No growth at 5 days  --  Pseudomonas aeruginosa      .Urine  12-11 @ 21:15   <10,000 CFU/mL Normal Urogenital Genet  --  --      Clean Catch Clean Catch (Midstream)  12-01 @ 12:05   <10,000 CFU/mL Normal Urogenital Genet  --  --            RADIOLOGY & ADDITIONAL TESTS:          Radiology reports read and imaging reviewed  :  [ x] YES  [ ] NO  (I am not a radiologist and therefore rely on Radiologist reports to facilitate with diagnosis and treatment plans)    Consultant(s) Notes Reviewed:  [x ] YES  [ ] NO    Care Discussed with Consultants/Other Providers [x ] YES  [ ] NO  Care plan and all findings were discussed in detail with patient.  All questions and concerns addressed

## 2024-01-21 PROCEDURE — 99232 SBSQ HOSP IP/OBS MODERATE 35: CPT

## 2024-01-21 RX ADMIN — BICTEGRAVIR SODIUM, EMTRICITABINE, AND TENOFOVIR ALAFENAMIDE FUMARATE 1 TABLET(S): 30; 120; 15 TABLET ORAL at 12:52

## 2024-01-21 RX ADMIN — Medication 240 MILLIGRAM(S): at 05:55

## 2024-01-21 RX ADMIN — Medication 100 MILLIGRAM(S): at 05:55

## 2024-01-21 RX ADMIN — SENNA PLUS 2 TABLET(S): 8.6 TABLET ORAL at 21:24

## 2024-01-21 RX ADMIN — CEFEPIME 100 MILLIGRAM(S): 1 INJECTION, POWDER, FOR SOLUTION INTRAMUSCULAR; INTRAVENOUS at 05:55

## 2024-01-21 RX ADMIN — LACTULOSE 10 GRAM(S): 10 SOLUTION ORAL at 13:52

## 2024-01-21 RX ADMIN — CEFEPIME 100 MILLIGRAM(S): 1 INJECTION, POWDER, FOR SOLUTION INTRAMUSCULAR; INTRAVENOUS at 18:58

## 2024-01-21 RX ADMIN — Medication 3 MILLIGRAM(S): at 21:24

## 2024-01-21 NOTE — PROGRESS NOTE ADULT - SUBJECTIVE AND OBJECTIVE BOX
PROGRESS NOTE:     Patient is a 58y old  Male who presents with a chief complaint of Pseudomonas UTI, KATY (20 Jan 2024 16:41)        SUBJECTIVE & OBJECTIVE:   Pt seen and examined at bedside in AM    no overnight events.   no new complaints  states pain improved today     REVIEW OF SYSTEMS: remaining ROS negative     PHYSICAL EXAM:    Vital Signs Last 24 Hrs  T(C): 36.9 (21 Jan 2024 17:48), Max: 37.1 (20 Jan 2024 23:38)  T(F): 98.4 (21 Jan 2024 17:48), Max: 98.8 (20 Jan 2024 23:38)  HR: 73 (21 Jan 2024 17:48) (72 - 78)  BP: 133/90 (21 Jan 2024 17:48) (103/72 - 134/94)  BP(mean): --  RR: 18 (21 Jan 2024 17:48) (17 - 18)  SpO2: 98% (21 Jan 2024 17:48) (96% - 98%)    Parameters below as of 21 Jan 2024 17:48  Patient On (Oxygen Delivery Method): room air            GENERAL: NAD, no increased WOB  HEAD:  Atraumatic, Normocephalic  EYES: EOMI, PERRLA, conjunctiva and sclera clear  ENMT: Moist mucous membranes  NECK: Supple, No JVD  NERVOUS SYSTEM:  Alert & Oriented X3, no focal neuro deficits   CHEST/LUNG: Clear to auscultation bilaterally; No rales, rhonchi, wheezing, or rubs  HEART: Regular rate and rhythm; No murmurs, rubs, or gallops  ABDOMEN: Soft, Nontender, Nondistended; Bowel sounds present; + Suprapubic pain on palpation >>resolved   EXTREMITIES:  2+ Peripheral Pulses b/l, No clubbing, cyanosis, calf tenderness or edema b/l    MEDICATIONS  (STANDING):  bictegravir 50 mG/emtricitabine 200 mG/tenofovir alafenamide 25 mG (BIKTARVY) 1 Tablet(s) Oral daily  cefepime   IVPB 2000 milliGRAM(s) IV Intermittent every 12 hours  diltiazem    milliGRAM(s) Oral daily  lactulose Syrup 10 Gram(s) Oral daily  metoprolol succinate  milliGRAM(s) Oral daily  senna 2 Tablet(s) Oral at bedtime    MEDICATIONS  (PRN):  acetaminophen     Tablet .. 650 milliGRAM(s) Oral every 6 hours PRN Temp greater or equal to 38C (100.4F), Mild Pain (1 - 3)  ALPRAZolam 1 milliGRAM(s) Oral every 6 hours PRN anxiety  aluminum hydroxide/magnesium hydroxide/simethicone Suspension 30 milliLiter(s) Oral every 4 hours PRN Dyspepsia  melatonin 3 milliGRAM(s) Oral at bedtime PRN Insomnia  morphine  - Injectable 2 milliGRAM(s) IV Push every 4 hours PRN for breakthrough pain  ondansetron Injectable 4 milliGRAM(s) IV Push every 8 hours PRN Nausea and/or Vomiting  oxycodone    5 mG/acetaminophen 325 mG 2 Tablet(s) Oral every 6 hours PRN Severe Pain (7 - 10)  zolpidem 10 milliGRAM(s) Oral at bedtime PRN Insomnia      LABS:                                   10.7   5.21  )-----------( 448      ( 20 Jan 2024 07:50 )             33.2   01-20    140  |  110<H>  |  11  ----------------------------<  97  3.9   |  24  |  1.00    Ca    9.0      20 Jan 2024 07:50        Urinalysis Basic - ( 20 Jan 2024 07:50 )    Color: x / Appearance: x / SG: x / pH: x  Gluc: 97 mg/dL / Ketone: x  / Bili: x / Urobili: x   Blood: x / Protein: x / Nitrite: x   Leuk Esterase: x / RBC: x / WBC x   Sq Epi: x / Non Sq Epi: x / Bacteria: x        CAPILLARY BLOOD GLUCOSE                RECENT CULTURES:  Urine culture:  01-19 @ 13:27 --   10,000 - 49,000 CFU/mL Gram Negative Rods  Urine culture:  01-19 @ 13:15 --   No growth at 24 hours  Urine culture:  01-19 @ 13:05 --   No growth at 24 hours    Clean Catch Clean Catch (Midstream)  01-19 @ 13:27   10,000 - 49,000 CFU/mL Gram Negative Rods  --  --      .Blood Blood-Peripheral  01-19 @ 13:15   No growth at 24 hours  --  --      .Blood Blood-Venous  01-19 @ 13:05   No growth at 24 hours  --  --      .Blood Blood-Peripheral  01-14 @ 11:25   No growth at 5 days  --  --      .Blood Blood-Peripheral  01-14 @ 11:16   No growth at 5 days  --  Pseudomonas aeruginosa      .Urine  12-11 @ 21:15   <10,000 CFU/mL Normal Urogenital Genet  --  --      Clean Catch Clean Catch (Midstream)  12-01 @ 12:05   <10,000 CFU/mL Normal Urogenital Genet  --  --            RADIOLOGY & ADDITIONAL TESTS:          Radiology reports read and imaging reviewed  :  [ x] YES  [ ] NO  (I am not a radiologist and therefore rely on Radiologist reports to facilitate with diagnosis and treatment plans)    Consultant(s) Notes Reviewed:  [x ] YES  [ ] NO    Care Discussed with Consultants/Other Providers [x ] YES  [ ] NO  Care plan and all findings were discussed in detail with patient.  All questions and concerns addressed

## 2024-01-21 NOTE — PROGRESS NOTE ADULT - SUBJECTIVE AND OBJECTIVE BOX
SURGERY PROGRESS HPI:  Pt seen and examined at bedside. Resting comfortably. Pt denies complaints. No acute events overnight.       Vital Signs Last 24 Hrs  T(C): 36.9 (21 Jan 2024 17:48), Max: 37.1 (20 Jan 2024 23:38)  T(F): 98.4 (21 Jan 2024 17:48), Max: 98.8 (20 Jan 2024 23:38)  HR: 73 (21 Jan 2024 17:48) (72 - 78)  BP: 133/90 (21 Jan 2024 17:48) (103/72 - 134/94)  BP(mean): --  RR: 18 (21 Jan 2024 17:48) (17 - 18)  SpO2: 98% (21 Jan 2024 17:48) (96% - 98%)    Parameters below as of 21 Jan 2024 17:48  Patient On (Oxygen Delivery Method): room air          PHYSICAL EXAM:    GENERAL: NAD  HEAD:  Atraumatic, Normocephalic  CHEST/LUNG: Normal work of breathing   HEART: RRR   ABDOMEN: non distended, soft, non tender, no guarding  : : no suprapubic tenderness or distention noted     I&O's Detail    20 Jan 2024 07:01  -  21 Jan 2024 07:00  --------------------------------------------------------  IN:    Oral Fluid: 655 mL  Total IN: 655 mL    OUT:  Total OUT: 0 mL    Total NET: 655 mL

## 2024-01-21 NOTE — PROGRESS NOTE ADULT - ASSESSMENT
57 Y/O male with a PMHx of Anal ca, prostate ca s/p radiation therapy, HTN, HLD, with a PSHx of Suarez's, stent placement and most recent stent exchange with Dr. Mathew on 12/13/2023, with recent evaluation in [facility] ED for fevers found to have a UTI and started on antibiotics.  Patient returns to ED for admission with IV Abx after urine culture resulted.    Found with small left hydrocele and severe bilateral hydronephrosis on US. Bilateral ureteral stents in place   No leukocytosis, KATY resolved.   Pre peacock Ucx: GNR     still pending final C&S, continue IV cefepime  monitor UOP, voiding well on own   continue care per primary team

## 2024-01-22 LAB
-  AMIKACIN: SIGNIFICANT CHANGE UP
-  AZTREONAM: SIGNIFICANT CHANGE UP
-  CEFEPIME: SIGNIFICANT CHANGE UP
-  CEFTAZIDIME: SIGNIFICANT CHANGE UP
-  CIPROFLOXACIN: SIGNIFICANT CHANGE UP
-  IMIPENEM: SIGNIFICANT CHANGE UP
-  LEVOFLOXACIN: SIGNIFICANT CHANGE UP
-  MEROPENEM: SIGNIFICANT CHANGE UP
-  PIPERACILLIN/TAZOBACTAM: SIGNIFICANT CHANGE UP
CULTURE RESULTS: ABNORMAL
FLUAV AG NPH QL: SIGNIFICANT CHANGE UP
FLUBV AG NPH QL: SIGNIFICANT CHANGE UP
METHOD TYPE: SIGNIFICANT CHANGE UP
ORGANISM # SPEC MICROSCOPIC CNT: ABNORMAL
ORGANISM # SPEC MICROSCOPIC CNT: SIGNIFICANT CHANGE UP
SARS-COV-2 RNA SPEC QL NAA+PROBE: SIGNIFICANT CHANGE UP
SPECIMEN SOURCE: SIGNIFICANT CHANGE UP

## 2024-01-22 PROCEDURE — 99222 1ST HOSP IP/OBS MODERATE 55: CPT

## 2024-01-22 PROCEDURE — 99232 SBSQ HOSP IP/OBS MODERATE 35: CPT

## 2024-01-22 RX ADMIN — Medication 1000 MILLIGRAM(S): at 08:22

## 2024-01-22 RX ADMIN — Medication 240 MILLIGRAM(S): at 05:51

## 2024-01-22 RX ADMIN — CEFEPIME 100 MILLIGRAM(S): 1 INJECTION, POWDER, FOR SOLUTION INTRAMUSCULAR; INTRAVENOUS at 17:00

## 2024-01-22 RX ADMIN — SENNA PLUS 2 TABLET(S): 8.6 TABLET ORAL at 22:09

## 2024-01-22 RX ADMIN — MORPHINE SULFATE 2 MILLIGRAM(S): 50 CAPSULE, EXTENDED RELEASE ORAL at 16:55

## 2024-01-22 RX ADMIN — Medication 100 MILLIGRAM(S): at 05:51

## 2024-01-22 RX ADMIN — MORPHINE SULFATE 2 MILLIGRAM(S): 50 CAPSULE, EXTENDED RELEASE ORAL at 17:30

## 2024-01-22 RX ADMIN — CEFEPIME 100 MILLIGRAM(S): 1 INJECTION, POWDER, FOR SOLUTION INTRAMUSCULAR; INTRAVENOUS at 05:51

## 2024-01-22 RX ADMIN — ZOLPIDEM TARTRATE 10 MILLIGRAM(S): 10 TABLET ORAL at 22:09

## 2024-01-22 RX ADMIN — BICTEGRAVIR SODIUM, EMTRICITABINE, AND TENOFOVIR ALAFENAMIDE FUMARATE 1 TABLET(S): 30; 120; 15 TABLET ORAL at 11:56

## 2024-01-22 RX ADMIN — LACTULOSE 10 GRAM(S): 10 SOLUTION ORAL at 11:55

## 2024-01-22 NOTE — CONSULT NOTE ADULT - SUBJECTIVE AND OBJECTIVE BOX
[facility]  INFECTIOUS DISEASES   [address] [place] [address]  Tel: [phone]     Fax: [phone]  ==============================================================================  DO Olman Fernandez MD Alexandra Gutman, NP   ==============================================================================    DAVID ROWLEY  [id number]  Male  58y ([birth year])        Patient is a 58y old  Male who presents with a chief complaint of Pseudomonas UTI, KATY (21 Jan 2024 20:00)      HPI:  David Rowley is a 58 year old male with PMHx of HTN, HLD, anal CA (s/p chemotherapy, XRT, resection and colostomy creation in 3/2022), ureteral stricture (s/p b/l ureteral JJ stent with last exchange by Dr. Mathew in 12/2023), HIV, and anxiety who presented to the ED on 1/19/24 for being called back by ER.    Patient reports he has been having persistent fevers at home since 1/3/24. Also complaints of dysuria and urinary frequency for the past year so he did not think anything of his urinary symptoms. However, he procrastinated because he did not want to come to the ER as he is frequently hospitalized and wanted to stay home if possible. Came to ER on 1/14/24. Found to have fever, UTI, and moderate bilateral hydronephrosis on CT. U/S testicles normal at that time. Evaluated by urology. Symptoms improved after IV antibiotics and discharged home on cefpodoxime 200 mg BID. Patient reports his insurance requires a pre-authorization for cefpodoxime so he was unable to pick it up from [facility] pharmacy. Went to see urologist, Dr. Mathew on 1/16/24 and wife states they stayed in his office until "any antibiotic was sent to [facility] and it was confirmed." Started on Bactrim but with persistent symptoms. Last night, received a phone call from someone in the ER advising him to come back as his urine culture returned for Pseudomonas.     In the ED, VSS. Hgb 10.8, plts 484K, Cr 1.66. U/A with large blood, large leuks, +nitrites, WBC TNTC, RBC TNTC, many bacteria. U/S renal with severe b/l hydronephrosis and b/l ureteral stents grossly in satisfactory position. U/S testicles with normal testicles and small b/l hydroceles. Received 1L NS bolus, cefepime 1 g IV, acetaminophen 1 g IV. Evaluated by urology who recommends IV antibiotics.  (19 Jan 2024 23:57)    agree with above. HIV is well controlled. . Has pseudomonas in urine which is R to quinolones. He reports feeling better since arriving and starting on cefepime   ID consulted for workup and antibiotic management     PAST MEDICAL & SURGICAL HISTORY:  HTN (hypertension)      HLD (hyperlipidemia)      HIV infection      Anxiety      BPH (benign prostatic hyperplasia)      History of anal cancer      Anal lesion      S/P colostomy      History of gastroscopy      Ureteral stent present          Allergies  No Known Allergies        ANTIMICROBIALS:  bictegravir 50 mG/emtricitabine 200 mG/tenofovir alafenamide 25 mG (BIKTARVY) 1 daily  cefepime   IVPB 2000 every 12 hours      MEDICATIONS  (STANDING):  bictegravir 50 mG/emtricitabine 200 mG/tenofovir alafenamide 25 mG (BIKTARVY)   1 Tablet(s) Oral (01-22-24 @ 11:56)   1 Tablet(s) Oral (01-21-24 @ 12:52)   1 Tablet(s) Oral (01-20-24 @ 14:37)    cefepime   IVPB   100 mL/Hr IV Intermittent (01-19-24 @ 14:11)    cefepime   IVPB   100 mL/Hr IV Intermittent (01-22-24 @ 05:51)   100 mL/Hr IV Intermittent (01-21-24 @ 18:58)   100 mL/Hr IV Intermittent (01-21-24 @ 05:55)   100 mL/Hr IV Intermittent (01-20-24 @ 18:42)   100 mL/Hr IV Intermittent (01-20-24 @ 05:15)        OTHER MEDS: MEDICATIONS  (STANDING):  acetaminophen     Tablet .. 650 every 6 hours PRN  ALPRAZolam 1 every 6 hours PRN  aluminum hydroxide/magnesium hydroxide/simethicone Suspension 30 every 4 hours PRN  diltiazem    daily  lactulose Syrup 10 daily  melatonin 3 at bedtime PRN  metoprolol succinate  daily  morphine  - Injectable 2 every 4 hours PRN  ondansetron Injectable 4 every 8 hours PRN  oxycodone    5 mG/acetaminophen 325 mG 2 every 6 hours PRN  senna 2 at bedtime  zolpidem 10 at bedtime PRN      SOCIAL HISTORY:     Smoking Cigarettes [ ]Active [ ] Former [ x]Denies   ETOH [x ]denies [ ]Former [ ]Current Use denies   Drug Use [xNever [ ] Former [ ] Active     FAMILY HISTORY:  FH: prostate cancer    FH: breast cancer        REVIEW OF SYSTEMS  [  ] ROS unobtainable because:    [  x] All other systems negative except as noted below:	    Constitutional:  [ ] fever [ ] chills  [ ] weight loss  [ ] weakness  Skin:  [ ] rash [ ] phlebitis	  Eyes: [ ] icterus [ ] pain  [ ] discharge	  ENMT: [ ] sore throat  [ ] thrush [ ] ulcers [ ] exudates  Respiratory: [ ] dyspnea [ ] hemoptysis [ ] cough [ ] sputum	  Cardiovascular:  [ ] chest pain [ ] palpitations [ ] edema	  Gastrointestinal:  [ ] nausea [ ] vomiting [ ] diarrhea [ ] constipation [ ] pain	  Genitourinary:  [x ] dysuria [ ] frequency [ ] hematuria [ ] discharge [ ] flank pain  [ ] incontinence  Musculoskeletal:  [ ] myalgias [ ] arthralgias [ ] arthritis  [ ] back pain  Neurological:  [ ] headache [ ] seizures  [ ] confusion/altered mental status  Psychiatric:  [ ] anxiety [ ] depression	  Hematology/Lymphatics:  [ ] lymphadenopathy  Endocrine:  [ ] adrenal [ ] thyroid  Allergic/Immunologic:	 [ ] transplant [ ] seasonal    Vital Signs Last 24 Hrs  T(F): 98.1 (01-22-24 @ 11:12), Max: 99 (01-19-24 @ 22:25)    Vital Signs Last 24 Hrs  HR: 83 (01-22-24 @ 11:12) (73 - 83)  BP: 119/84 (01-22-24 @ 11:12) (109/74 - 133/90)  RR: 17 (01-22-24 @ 11:12)  SpO2: 96% (01-22-24 @ 11:12) (95% - 98%)  Wt(kg): --    PHYSICAL EXAM:  Constitutional: non-toxic, no distress  HEAD/EYES: anicteric, no conjunctival injection  ENT:  supple, no thrush  Cardiovascular:   normal S1, S2, no murmur, no edema  Respiratory:  clear BS bilaterally, no wheezes, no rales  GI:  soft, non-tender, normal bowel sounds,  +ostomy present  :  no davis, no CVA tenderness, meatus appears to have radiation  Musculoskeletal:  no synovitis, normal ROM  Neurologic: awake and alert x3, normal strength, no focal findings  Skin:  no rash, no erythema, no phlebitis  Heme/Onc: no lymphadenopathy   Psychiatric:  awake, alert, appropriate mood          WBC Count: 5.21 K/uL (01-20 @ 07:50)  WBC Count: 8.74 K/uL (01-19 @ 13:15)      Auto Neutrophil %: 70.9 % (01-19-24 @ 13:15)  Auto Neutrophil #: 6.21 K/uL (01-19-24 @ 13:15)      Creatinine Trend: 1.00<--, 1.66<--, 1.29<--      Lipase: 23 U/L (01-19-24 @ 13:15)    MICROBIOLOGY:  Clean Catch Clean Catch (Midstream)  01-19-24   10,000 - 49,000 CFU/mL Pseudomonas aeruginosa  --  Pseudomonas aeruginosa      .Blood Blood-Peripheral  01-19-24   No growth at 48 Hours  --  --      .Blood Blood-Venous  01-19-24   No growth at 48 Hours  --  --      .Blood Blood-Peripheral  01-14-24   No growth at 5 days  --  --      .Blood Blood-Peripheral  01-14-24   No growth at 5 days  --  Pseudomonas aeruginosa        SARS-CoV-2: NotDetec (06 Oct 2023 02:40)  SARS-CoV-2: NotDetec (13 Sep 2023 21:20)      RADIOLOGY:  < from: CT Abdomen and Pelvis w/ IV Cont (01.14.24 @ 13:03) >  IMPRESSION:  1.  Worsened moderate to severe bilateral hydroureteronephrosis despite   the presence of bilateral ureteral stents in appropriate position.   Correlate for stent malfunction.    2.  Ureteral and bladder wall thickening, which may be a combination of   instrumentation and cystitis/ascending urinary tract infection.    3.  Mild retroperitoneal lymphadenopathy, likely reactive.    < end of copied text >    I have personally reviewed the above imaging

## 2024-01-22 NOTE — PROGRESS NOTE ADULT - SUBJECTIVE AND OBJECTIVE BOX
Patient seen and examined at bedside with Dr. Bautista offering no complaints.   Denies pain, nausea/ vomiting, chills, SOB, chest pain, calf tenderness.          Vital Signs Last 24 Hrs  T(F): 97.6 (01-22-24 @ 17:39), Max: 98.8 (01-22-24 @ 05:34)  HR: 85 (01-22-24 @ 17:39)  BP: 138/97 (01-22-24 @ 17:39)  RR: 18 (01-22-24 @ 17:39)  SpO2: 97% (01-22-24 @ 17:39)  Wt(kg): --   CAPILLARY BLOOD GLUCOSE          GENERAL: Alert, NAD  CHEST/LUNG: Respirations non labored, good inspiratory effort  HEART: S1S2  HEENT: NCAT, EOMI, conjunctiva clear   ABDOMEN: Nondistended, + bowel sounds, nontender  : uncircumcised phallus with no discharge, testicles present in scrotal sac bilaterally with no TTP or swelling   EXTREMITIES:  No calf tenderness, no edema    I&O's Detail    21 Jan 2024 07:01  -  22 Jan 2024 07:00  --------------------------------------------------------  IN:    IV PiggyBack: 50 mL    Oral Fluid: 1090 mL  Total IN: 1140 mL    OUT:  Total OUT: 0 mL    Total NET: 1140 mL          LABS:              RADIOLOGY & ADDITIONAL STUDIES:    < from: US Testicles (01.19.24 @ 16:17) >    ACC: [id number] EXAM:  US SCROTUM AND CONTENTS   ORDERED BY: CAROLINA AYERS     PROCEDURE DATE:  01/19/2024          INTERPRETATION:  CLINICAL INFORMATION: Persistent left testicular pain    COMPARISON: None available.    TECHNIQUE: Testicular ultrasound utilizing color and spectral Doppler.    FINDINGS:    RIGHT:  Right testis: 4.1 cm x 1.6 cm x 2.9 cm. Normal echogenicity and   echotexture with no masses or areas of architectural distortion. Normal   arterial and venous blood flow pattern.  Right epididymis: Within normal limits a side from a 6 mm simple cyst.  Right hydrocele: Small.  Right varicocele: None.    LEFT:  Left testis: 3.3 x 2.8 x 2.3 cm. Normal echogenicity and echotexture with   no masses or areas of architectural distortion. Normal arterial and   venous blood flow pattern.  Left epididymis: Within normal limits.  Left hydrocele: Small.  Left varicocele: None.    IMPRESSION:    Normal testicles.    Small bilateral hydroceles    --- End of Report ---            SALOME BALTAZAR; Attending Radiologist  This document has been electronically signed. Jan 19 2024  4:53PM    < end of copied text >  < from: US Renal (01.19.24 @ 15:10) >    ACC: [id number] EXAM:  US KIDNEY(S)   ORDERED BY: CAROLINA AYERS     PROCEDURE DATE:  01/19/2024          INTERPRETATION:  CLINICAL INFORMATION: Bilateral renal stents.    COMPARISON: CT abdomen pelvis 1/14/2024.    TECHNIQUE: Sonography of the kidneys and bladder.    FINDINGS:  Right kidney: 14.4 cm. Severe hydronephrosis as noted on prior CT.   Proximal portion of ureteral stent visualized with tip in the renal   pelvis. A 5.8 cm simple upper pole cyst.    Left kidney: 14.8 cm. severe hydronephrosis. Proximal portion of a   ureteral stent is visualized with tip in the renal pelvis.. A 5.1 cm   simple upper pole cyst. A 1.4 cm lower pole simple cyst.    Urinary bladder: Distal portion of bilateral ureteral stents noted in the   bladder lumen. Prevoid bladder volume of 165 cc.. Bilateral ureteral jets   are seen.    IMPRESSION:  Severe bilateral hydronephrosis.  Bilateral ureteral stents grossly in satisfactory position. Correlate for   stent malfunction.    --- End of Report ---            SALOME MISHRA MD; Attending Radiologist  This document has been electronically signed. Jan 19 2024  3:33PM    < end of copied text >

## 2024-01-22 NOTE — PROGRESS NOTE ADULT - SUBJECTIVE AND OBJECTIVE BOX
PROGRESS NOTE:     Patient is a 58y old  Male who presents with a chief complaint of Pseudomonas UTI, KATY (20 Jan 2024 16:41)        SUBJECTIVE & OBJECTIVE:   Pt seen and examined at bedside in AM    no overnight events.   no new complaints      REVIEW OF SYSTEMS: remaining ROS negative     PHYSICAL EXAM:    Vital Signs Last 24 Hrs  T(C): 36.9 (21 Jan 2024 17:48), Max: 37.1 (20 Jan 2024 23:38)  T(F): 98.4 (21 Jan 2024 17:48), Max: 98.8 (20 Jan 2024 23:38)  HR: 73 (21 Jan 2024 17:48) (72 - 78)  BP: 133/90 (21 Jan 2024 17:48) (103/72 - 134/94)  BP(mean): --  RR: 18 (21 Jan 2024 17:48) (17 - 18)  SpO2: 98% (21 Jan 2024 17:48) (96% - 98%)    Parameters below as of 21 Jan 2024 17:48  Patient On (Oxygen Delivery Method): room air            GENERAL: NAD, no increased WOB  HEAD:  Atraumatic, Normocephalic  EYES: EOMI, PERRLA, conjunctiva and sclera clear  ENMT: Moist mucous membranes  NECK: Supple, No JVD  NERVOUS SYSTEM:  Alert & Oriented X3, no focal neuro deficits   CHEST/LUNG: Clear to auscultation bilaterally; No rales, rhonchi, wheezing, or rubs  HEART: Regular rate and rhythm; No murmurs, rubs, or gallops  ABDOMEN: Soft, Nontender, Nondistended; Bowel sounds present; + Suprapubic pain on palpation >>resolved   EXTREMITIES:  2+ Peripheral Pulses b/l, No clubbing, cyanosis, calf tenderness or edema b/l    MEDICATIONS  (STANDING):  bictegravir 50 mG/emtricitabine 200 mG/tenofovir alafenamide 25 mG (BIKTARVY) 1 Tablet(s) Oral daily  cefepime   IVPB 2000 milliGRAM(s) IV Intermittent every 12 hours  diltiazem    milliGRAM(s) Oral daily  lactulose Syrup 10 Gram(s) Oral daily  metoprolol succinate  milliGRAM(s) Oral daily  senna 2 Tablet(s) Oral at bedtime    MEDICATIONS  (PRN):  acetaminophen     Tablet .. 650 milliGRAM(s) Oral every 6 hours PRN Temp greater or equal to 38C (100.4F), Mild Pain (1 - 3)  ALPRAZolam 1 milliGRAM(s) Oral every 6 hours PRN anxiety  aluminum hydroxide/magnesium hydroxide/simethicone Suspension 30 milliLiter(s) Oral every 4 hours PRN Dyspepsia  melatonin 3 milliGRAM(s) Oral at bedtime PRN Insomnia  morphine  - Injectable 2 milliGRAM(s) IV Push every 4 hours PRN for breakthrough pain  ondansetron Injectable 4 milliGRAM(s) IV Push every 8 hours PRN Nausea and/or Vomiting  oxycodone    5 mG/acetaminophen 325 mG 2 Tablet(s) Oral every 6 hours PRN Severe Pain (7 - 10)  zolpidem 10 milliGRAM(s) Oral at bedtime PRN Insomnia      LABS:                                   10.7   5.21  )-----------( 448      ( 20 Jan 2024 07:50 )             33.2   01-20    140  |  110<H>  |  11  ----------------------------<  97  3.9   |  24  |  1.00    Ca    9.0      20 Jan 2024 07:50        Urinalysis Basic - ( 20 Jan 2024 07:50 )    Color: x / Appearance: x / SG: x / pH: x  Gluc: 97 mg/dL / Ketone: x  / Bili: x / Urobili: x   Blood: x / Protein: x / Nitrite: x   Leuk Esterase: x / RBC: x / WBC x   Sq Epi: x / Non Sq Epi: x / Bacteria: x        CAPILLARY BLOOD GLUCOSE                RECENT CULTURES:  Urine culture:  01-19 @ 13:27 --   10,000 - 49,000 CFU/mL Gram Negative Rods  Urine culture:  01-19 @ 13:15 --   No growth at 24 hours  Urine culture:  01-19 @ 13:05 --   No growth at 24 hours    Clean Catch Clean Catch (Midstream)  01-19 @ 13:27   10,000 - 49,000 CFU/mL Gram Negative Rods  --  --      .Blood Blood-Peripheral  01-19 @ 13:15   No growth at 24 hours  --  --      .Blood Blood-Venous  01-19 @ 13:05   No growth at 24 hours  --  --      .Blood Blood-Peripheral  01-14 @ 11:25   No growth at 5 days  --  --      .Blood Blood-Peripheral  01-14 @ 11:16   No growth at 5 days  --  Pseudomonas aeruginosa      .Urine  12-11 @ 21:15   <10,000 CFU/mL Normal Urogenital Genet  --  --      Clean Catch Clean Catch (Midstream)  12-01 @ 12:05   <10,000 CFU/mL Normal Urogenital Genet  --  --            RADIOLOGY & ADDITIONAL TESTS:          Radiology reports read and imaging reviewed  :  [ x] YES  [ ] NO  (I am not a radiologist and therefore rely on Radiologist reports to facilitate with diagnosis and treatment plans)    Consultant(s) Notes Reviewed:  [x ] YES  [ ] NO    Care Discussed with Consultants/Other Providers [x ] YES  [ ] NO  Care plan and all findings were discussed in detail with patient.  All questions and concerns addressed

## 2024-01-22 NOTE — CONSULT NOTE ADULT - ASSESSMENT
David Turcios is a 58 year old male with PMHx of HTN, HLD, anal CA (s/p chemotherapy, XRT, resection and colostomy creation in 3/2022), ureteral stricture (s/p b/l ureteral JJ stent with last exchange by Dr. Mathew in 12/2023), HIV, and anxiety who presented to the ED on 1/19/24 for being called back by ER. Found to have pseudomonas in urine. blood cultures negative. Afebrile, UA positive and symptomatic. CT from 1/14 shows worsening hydro even with stents. Pseudomonas would not be covered by the vantin prescribed Neither would it be covered by cipro/levaquin.  Agree with cefepime for a minimum of 7 days. Stents in an infected space may make it difficult to fully eradicate infection.     Plan:  continue cefepime for 7 days total unless urologic procedure pending   No PO option available   urology following  For HIV can continue Biktarvy (Bictegravir/emtricitibine/tenofavir alafenamide)   he should continue to follow with his HIV provider outpatient  no need for HIV VL or T cell subset, well controlled in December     Discussed with Dr. Gaye Jones, DO  Infectious Disease Attending  Reachable via Microsoft Teams or ID office: [phone]  After 5pm/weekends please call [phone] for all inquiries and new consults

## 2024-01-22 NOTE — PROGRESS NOTE ADULT - ASSESSMENT
57 Y/O male with a PMHx of Anal ca, prostate ca s/p radiation therapy, HTN, HLD, with a PSHx of Suarez's, stent placement and most recent stent exchange with Dr. Mathew on 12/13/2023, with recent evaluation in [facility] ED for fevers found to have a UTI and started on antibiotics.  Patient returns to ED for admission with IV Abx after urine culture resulted.    Found with small left hydrocele and severe bilateral hydronephrosis on US. Bilateral ureteral stents in place. No leukocytosis, KATY resolved.   Ucx: pseudomonas. ABx sensitive to cefipime. Once infection clears, plan to have stents replaced in OR due to stents as possible nidus for infection.     PLAN:     - Continue ABx per ID   - Monitor UOP, voiding well on own   - F/U AM labs   - Stent replacement planning   - Continue care per primary team   - Discussed with Dr. Bautista

## 2024-01-23 PROCEDURE — 99232 SBSQ HOSP IP/OBS MODERATE 35: CPT

## 2024-01-23 RX ORDER — CHOLECALCIFEROL (VITAMIN D3) 125 MCG
1000 CAPSULE ORAL DAILY
Refills: 0 | Status: DISCONTINUED | OUTPATIENT
Start: 2024-01-23 | End: 2024-01-26

## 2024-01-23 RX ADMIN — MORPHINE SULFATE 2 MILLIGRAM(S): 50 CAPSULE, EXTENDED RELEASE ORAL at 04:31

## 2024-01-23 RX ADMIN — MORPHINE SULFATE 2 MILLIGRAM(S): 50 CAPSULE, EXTENDED RELEASE ORAL at 21:20

## 2024-01-23 RX ADMIN — MORPHINE SULFATE 2 MILLIGRAM(S): 50 CAPSULE, EXTENDED RELEASE ORAL at 04:16

## 2024-01-23 RX ADMIN — MORPHINE SULFATE 2 MILLIGRAM(S): 50 CAPSULE, EXTENDED RELEASE ORAL at 21:05

## 2024-01-23 RX ADMIN — CEFEPIME 100 MILLIGRAM(S): 1 INJECTION, POWDER, FOR SOLUTION INTRAMUSCULAR; INTRAVENOUS at 05:57

## 2024-01-23 RX ADMIN — ZOLPIDEM TARTRATE 10 MILLIGRAM(S): 10 TABLET ORAL at 22:17

## 2024-01-23 RX ADMIN — BICTEGRAVIR SODIUM, EMTRICITABINE, AND TENOFOVIR ALAFENAMIDE FUMARATE 1 TABLET(S): 30; 120; 15 TABLET ORAL at 11:06

## 2024-01-23 RX ADMIN — MORPHINE SULFATE 2 MILLIGRAM(S): 50 CAPSULE, EXTENDED RELEASE ORAL at 14:19

## 2024-01-23 RX ADMIN — LACTULOSE 10 GRAM(S): 10 SOLUTION ORAL at 11:06

## 2024-01-23 RX ADMIN — Medication 240 MILLIGRAM(S): at 05:57

## 2024-01-23 RX ADMIN — MORPHINE SULFATE 2 MILLIGRAM(S): 50 CAPSULE, EXTENDED RELEASE ORAL at 10:35

## 2024-01-23 RX ADMIN — CEFEPIME 100 MILLIGRAM(S): 1 INJECTION, POWDER, FOR SOLUTION INTRAMUSCULAR; INTRAVENOUS at 17:24

## 2024-01-23 RX ADMIN — Medication 100 MILLIGRAM(S): at 05:57

## 2024-01-23 RX ADMIN — MORPHINE SULFATE 2 MILLIGRAM(S): 50 CAPSULE, EXTENDED RELEASE ORAL at 09:35

## 2024-01-23 NOTE — PHARMACOTHERAPY INTERVENTION NOTE - COMMENTS
Mylanta can cause decreased absorption of Biktarvy if administered concomitantly. Mylanta is ordered PRN and pt has not needed any doses. Recommended to dc Mylanta,

## 2024-01-23 NOTE — PROGRESS NOTE ADULT - ASSESSMENT
59 Y/O male with a PMHx of Anal ca, prostate ca s/p radiation therapy, HTN, HLD, with a PSHx of Suarez's, stent placement and most recent stent exchange with Dr. Mathew on 12/13/2023, with recent evaluation in [facility] ED for fevers found to have a UTI and started on antibiotics.  Patient returns to ED for admission with IV Abx after urine culture resulted.    Found with small left hydrocele and severe bilateral hydronephrosis on US. Bilateral ureteral stents in place. No leukocytosis, KATY resolved.   Ucx: pseudomonas. ABx sensitive to cefipime. Once infection clears, plan to have stents replaced in OR due to stents as possible nidus for infection. Awaiting final BCx.       PLAN:     - Continue ABx per ID   - Monitor UOP, voiding well on own   - F/U AM labs   - Stent replacement planning   - Continue care per primary team  59 Y/O male with a PMHx of Anal ca, prostate ca s/p radiation therapy, HTN, HLD, with a PSHx of Suarez's, stent placement and most recent stent exchange with Dr. Mathew on 12/13/2023, with recent evaluation in [facility] ED for fevers found to have a UTI and started on antibiotics.  Patient returns to ED for admission with IV Abx after urine culture resulted.    Found with small left hydrocele and severe bilateral hydronephrosis on US. Bilateral ureteral stents in place. No leukocytosis, KATY resolved.   Ucx: pseudomonas. ABx sensitive to cefipime. Awaiting final BCx.   Currently planning for PCN vs bilateral stent replacement.      PLAN:     - Continue ABx per ID   - Monitor UOP, voiding well on own   - F/U AM labs   - PCN vs Stent replacement planning   - Continue care per primary team   - Discussed with Dr. Recinos

## 2024-01-23 NOTE — PROGRESS NOTE ADULT - SUBJECTIVE AND OBJECTIVE BOX
PROGRESS NOTE:     Patient is a 58y old  Male who presents with a chief complaint of Pseudomonas UTI, KATY (20 Jan 2024 16:41)        SUBJECTIVE & OBJECTIVE:   Pt seen and examined at bedside in AM    no overnight events.   no new complaints      REVIEW OF SYSTEMS: remaining ROS negative     PHYSICAL EXAM:    Vital Signs Last 24 Hrs  T(C): 37.1 (23 Jan 2024 11:21), Max: 37.1 (23 Jan 2024 05:21)  T(F): 98.8 (23 Jan 2024 11:21), Max: 98.8 (23 Jan 2024 11:21)  HR: 90 (23 Jan 2024 11:21) (76 - 90)  BP: 131/89 (23 Jan 2024 11:21) (127/83 - 147/99)  BP(mean): --  RR: 18 (23 Jan 2024 05:21) (17 - 18)  SpO2: 97% (23 Jan 2024 11:21) (97% - 99%)    Parameters below as of 22 Jan 2024 17:39  Patient On (Oxygen Delivery Method): room air            GENERAL: NAD, no increased WOB  HEAD:  Atraumatic, Normocephalic  EYES: EOMI, PERRLA, conjunctiva and sclera clear  ENMT: Moist mucous membranes  NECK: Supple, No JVD  NERVOUS SYSTEM:  Alert & Oriented X3, no focal neuro deficits   CHEST/LUNG: Clear to auscultation bilaterally; No rales, rhonchi, wheezing, or rubs  HEART: Regular rate and rhythm; No murmurs, rubs, or gallops  ABDOMEN: Soft, Nontender, Nondistended; Bowel sounds present; + Suprapubic pain on palpation >>resolved   EXTREMITIES:  2+ Peripheral Pulses b/l, No clubbing, cyanosis, calf tenderness or edema b/l    MEDICATIONS  (STANDING):  bictegravir 50 mG/emtricitabine 200 mG/tenofovir alafenamide 25 mG (BIKTARVY) 1 Tablet(s) Oral daily  cefepime   IVPB 2000 milliGRAM(s) IV Intermittent every 12 hours  diltiazem    milliGRAM(s) Oral daily  lactulose Syrup 10 Gram(s) Oral daily  metoprolol succinate  milliGRAM(s) Oral daily  senna 2 Tablet(s) Oral at bedtime    MEDICATIONS  (PRN):  acetaminophen     Tablet .. 650 milliGRAM(s) Oral every 6 hours PRN Temp greater or equal to 38C (100.4F), Mild Pain (1 - 3)  ALPRAZolam 1 milliGRAM(s) Oral every 6 hours PRN anxiety  aluminum hydroxide/magnesium hydroxide/simethicone Suspension 30 milliLiter(s) Oral every 4 hours PRN Dyspepsia  melatonin 3 milliGRAM(s) Oral at bedtime PRN Insomnia  morphine  - Injectable 2 milliGRAM(s) IV Push every 4 hours PRN for breakthrough pain  ondansetron Injectable 4 milliGRAM(s) IV Push every 8 hours PRN Nausea and/or Vomiting  oxycodone    5 mG/acetaminophen 325 mG 2 Tablet(s) Oral every 6 hours PRN Severe Pain (7 - 10)  zolpidem 10 milliGRAM(s) Oral at bedtime PRN Insomnia      LABS:                        no new labs       Urinalysis Basic - ( 20 Jan 2024 07:50 )    Color: x / Appearance: x / SG: x / pH: x  Gluc: 97 mg/dL / Ketone: x  / Bili: x / Urobili: x   Blood: x / Protein: x / Nitrite: x   Leuk Esterase: x / RBC: x / WBC x   Sq Epi: x / Non Sq Epi: x / Bacteria: x        CAPILLARY BLOOD GLUCOSE                RECENT CULTURES:  Urine culture:  01-19 @ 13:27 --   10,000 - 49,000 CFU/mL Gram Negative Rods  Urine culture:  01-19 @ 13:15 --   No growth at 24 hours  Urine culture:  01-19 @ 13:05 --   No growth at 24 hours    Clean Catch Clean Catch (Midstream)  01-19 @ 13:27   10,000 - 49,000 CFU/mL Gram Negative Rods  --  --      .Blood Blood-Peripheral  01-19 @ 13:15   No growth at 24 hours  --  --      .Blood Blood-Venous  01-19 @ 13:05   No growth at 24 hours  --  --      .Blood Blood-Peripheral  01-14 @ 11:25   No growth at 5 days  --  --      .Blood Blood-Peripheral  01-14 @ 11:16   No growth at 5 days  --  Pseudomonas aeruginosa      .Urine  12-11 @ 21:15   <10,000 CFU/mL Normal Urogenital Genet  --  --      Clean Catch Clean Catch (Midstream)  12-01 @ 12:05   <10,000 CFU/mL Normal Urogenital Genet  --  --            RADIOLOGY & ADDITIONAL TESTS:          Radiology reports read and imaging reviewed  :  [ x] YES  [ ] NO  (I am not a radiologist and therefore rely on Radiologist reports to facilitate with diagnosis and treatment plans)    Consultant(s) Notes Reviewed:  [x ] YES  [ ] NO    Care Discussed with Consultants/Other Providers [x ] YES  [ ] NO  Care plan and all findings were discussed in detail with patient.  All questions and concerns addressed

## 2024-01-23 NOTE — PROGRESS NOTE ADULT - ASSESSMENT
58 year old male with PMHx of HTN, HLD, anal CA (s/p chemotherapy, XRT, resection and colostomy creation in 3/2022), ureteral stricture (s/p b/l ureteral JJ stent with last exchange by Dr. Mathew in 12/2023), HIV, and anxiety who presented to the ED on 1/19/24 for being called back by ER and admitted for Pseudomonas UTI.    Patient reports he has been having persistent fevers at home since 1/3/24. Also complaints of dysuria and urinary frequency for the past year so he did not think anything of his urinary symptoms. However, he procrastinated because he did not want to come to the ER as he is frequently hospitalized and wanted to stay home if possible. Came to ER on 1/14/24. Found to have fever, UTI, and moderate bilateral hydronephrosis on CT. U/S testicles normal at that time. Evaluated by urology. Symptoms improved after IV antibiotics and discharged home on cefpodoxime 200 mg BID. Patient reports his insurance requires a pre-authorization for cefpodoxime so he was unable to pick it up from [facility] pharmacy. Went to see urologist, Dr. Mathew on 1/16/24 and wife states they stayed in his office until "any antibiotic was sent to [facility] and it was confirmed." Started on Bactrim but with persistent symptoms. Last night, received a phone call from someone in the ER advising him to come back as his urine culture returned for Pseudomonas.       Pseudomonas UTI  U/A with large blood, large leuks, +nitrites, WBC TNTC, RBC TNTC, many bacteria  S/p cefepime 1 g IV and acetaminophen 1 g IV in the ED  U/S renal with severe b/l hydronephrosis and b/l ureteral stents grossly in satisfactory position. U/S testicles with normal testicles and small b/l hydroceles. Received 1L NS bolus, cefepime 1 g IV, acetaminophen 1 g IV.   patient voiding well on his own   ID consult appreciated   per urology, plan for stent exchange >> Once infection clears, plan to have stents replaced in OR due to stents as possible nidus for infection.   Cefepime 2 g q12 continued will need 7 days , no PO option per sensitivities ; unless urologic procedure pending       KATY,  POA >>improved   S/p 1L NS bolus in the ED  Avoid nephrotoxins, renally dose meds  bladder scan PVR 0cc   Urology following  avoid nephrotoxic meds, dose all meds per eGFR        Thrombocytosis, likely reactive    HTN: PTA metoprolol succinate 100 mg, diltiazem  mg  HLD: PTA ezetimibe 10 mg held given not on formulary  HIV: last viral load undetectable (on 12/11/23), CD4 579: PTA bictegravir / emtricitabine / tenofovir 50 / 200 / 25 mg; outpatient follow-up with [facility]   Anxiety: controlled,  PTA alprazolam 1 mg q6 PRN  Chronic abdominal pain: outpatient palliative care notes reviewed,  checked, PTA morphine 15 mg q6 PRN  Opiate-induced constipation: PTA lactulose 10 g daily, senna 2 tabs qhs  Normocytic anemia, appears chronic: hgb 10.8 on admission, appears around baseline, no signs of bleeding, H/H stable     Medication reconciliation completed by my colleague  using med list provided by patient.   58 year old male with PMHx of HTN, HLD, anal CA (s/p chemotherapy, XRT, resection and colostomy creation in 3/2022), ureteral stricture (s/p b/l ureteral JJ stent with last exchange by Dr. Mathew in 12/2023), HIV, and anxiety who presented to the ED on 1/19/24 for being called back by ER and admitted for Pseudomonas UTI.    Patient reports he has been having persistent fevers at home since 1/3/24. Also complaints of dysuria and urinary frequency for the past year so he did not think anything of his urinary symptoms. However, he procrastinated because he did not want to come to the ER as he is frequently hospitalized and wanted to stay home if possible. Came to ER on 1/14/24. Found to have fever, UTI, and moderate bilateral hydronephrosis on CT. U/S testicles normal at that time. Evaluated by urology. Symptoms improved after IV antibiotics and discharged home on cefpodoxime 200 mg BID. Patient reports his insurance requires a pre-authorization for cefpodoxime so he was unable to pick it up from [facility] pharmacy. Went to see urologist, Dr. Mathew on 1/16/24 and wife states they stayed in his office until "any antibiotic was sent to [facility] and it was confirmed." Started on Bactrim but with persistent symptoms. Last night, received a phone call from someone in the ER advising him to come back as his urine culture returned for Pseudomonas.       Pseudomonas UTI  U/A with large blood, large leuks, +nitrites, WBC TNTC, RBC TNTC, many bacteria  S/p cefepime 1 g IV and acetaminophen 1 g IV in the ED  U/S renal with severe b/l hydronephrosis and b/l ureteral stents grossly in satisfactory position. U/S testicles with normal testicles and small b/l hydroceles. Received 1L NS bolus, cefepime 1 g IV, acetaminophen 1 g IV.   patient voiding well on his own   ID consult appreciated   per urology, plan for stent exchange >> Once infection clears, plan to have stents replaced in OR due to stents as possible nidus for infection.   Cefepime 2 g q12 continued will need 7 days (end date 1/26/24), no PO option per sensitivities ; unless urologic procedure pending       KATY,  POA >>improved   S/p 1L NS bolus in the ED  Avoid nephrotoxins, renally dose meds  bladder scan PVR 0cc   Urology following  avoid nephrotoxic meds, dose all meds per eGFR        Thrombocytosis, likely reactive    HTN: PTA metoprolol succinate 100 mg, diltiazem  mg  HLD: PTA ezetimibe 10 mg held given not on formulary  HIV: last viral load undetectable (on 12/11/23), CD4 579: PTA bictegravir / emtricitabine / tenofovir 50 / 200 / 25 mg; outpatient follow-up with [facility]   Anxiety: controlled,  PTA alprazolam 1 mg q6 PRN  Chronic abdominal pain: outpatient palliative care notes reviewed,  checked, PTA morphine 15 mg q6 PRN  Opiate-induced constipation: PTA lactulose 10 g daily, senna 2 tabs qhs  Normocytic anemia, appears chronic: hgb 10.8 on admission, appears around baseline, no signs of bleeding, H/H stable     Medication reconciliation completed by my colleague  using med list provided by patient.

## 2024-01-23 NOTE — PROGRESS NOTE ADULT - ASSESSMENT
David Turcios is a 58 year old male with PMHx of HTN, HLD, anal CA (s/p chemotherapy, XRT, resection and colostomy creation in 3/2022), ureteral stricture (s/p b/l ureteral JJ stent with last exchange by Dr. Mathew in 12/2023), HIV, and anxiety who presented to the ED on 1/19/24 for being called back by ER. Found to have pseudomonas in urine. blood cultures negative. Afebrile, UA positive and symptomatic. CT from 1/14 shows worsening hydro even with stents. Pseudomonas would not be covered by the vantin prescribed Neither would it be covered by cipro/levaquin.  Agree with cefepime for a minimum of 7 days. Stents in an infected space may make it difficult to fully eradicate infection.     1/23: afebrile, RA, no leukocytosis on 1/20, no new cbc since then, BCs with no growth to date, UC grew pseudomonas, Cefepime IV continued, will need 7 days total, pt's stent will be replaced once infection will clear, as per urology. HIV medication continued.     Plan:  continue cefepime for 7 days total unless urologic procedure pending   No PO option available   urology follow up is appreciated   For HIV can continue Biktarvy (Bictegravir/emtricitibine/tenofavir alafenamide)   he should continue to follow with his HIV provider outpatient  no need for HIV VL or T cell subset, well controlled in December     will discuss with Dr. Jones    David Turcios is a 58 year old male with PMHx of HTN, HLD, anal CA (s/p chemotherapy, XRT, resection and colostomy creation in 3/2022), ureteral stricture (s/p b/l ureteral JJ stent with last exchange by Dr. Mathew in 12/2023), HIV, and anxiety who presented to the ED on 1/19/24 for being called back by ER. Found to have pseudomonas in urine. blood cultures negative. Afebrile, UA positive and symptomatic. CT from 1/14 shows worsening hydro even with stents. Pseudomonas would not be covered by the vantin prescribed Neither would it be covered by cipro/levaquin.  Agree with cefepime for a minimum of 7 days. Stents in an infected space may make it difficult to fully eradicate infection.     1/23: afebrile, RA, no leukocytosis on 1/20, no new cbc since then, BCs with no growth to date, UC grew pseudomonas, Cefepime IV continued, will need 7 days total, pt's stent will be replaced once infection will clear, as per urology. HIV medication continued.     Plan:  continue cefepime for 7 days total unless urologic procedure pending   No PO option available   urology follow up is appreciated   For HIV can continue Biktarvy (Bictegravir/emtricitibine/tenofavir alafenamide)   he should continue to follow with his HIV provider outpatient  no need for HIV VL or T cell subset, well controlled in December   no objection to discharge upon completion of antibiotics    Signing off please call with question

## 2024-01-23 NOTE — PROGRESS NOTE ADULT - SUBJECTIVE AND OBJECTIVE BOX
HALLEYCEZARCARL  MRN-[id number]    Follow Up:  HIV, UTI    Interval History: the pt was seen and examined earlier, not in acute distress, awake and alert, ambulates, no fevers, RA, no new cbc, denies any urinary symptoms during today's exam, reports having loose bowel movements in his colostomy.      PAST MEDICAL & SURGICAL HISTORY:  HTN (hypertension)      HLD (hyperlipidemia)      HIV infection      Anxiety      BPH (benign prostatic hyperplasia)      History of anal cancer      Anal lesion      S/P colostomy      History of gastroscopy      Ureteral stent present          ROS:    [ ] Unobtainable because:  [x ] All other systems negative    Constitutional: no fever, no chills  Head: no trauma  Eyes: no vision changes, no eye pain  ENT:  no sore throat, no rhinorrhea  Cardiovascular:  no chest pain, no palpitation  Respiratory:  no SOB, no cough  GI:  no abd pain, no vomiting, no diarrhea  urinary: no dysuria, no hematuria, no flank pain  musculoskeletal:  no joint pain, no joint swelling  skin:  no rash  neurology:  no headache, no seizure, no change in mental status  psych: no anxiety, no depression         Allergies  No Known Allergies        ANTIMICROBIALS:  bictegravir 50 mG/emtricitabine 200 mG/tenofovir alafenamide 25 mG (BIKTARVY) 1 daily  cefepime   IVPB 2000 every 12 hours      OTHER MEDS:  acetaminophen     Tablet .. 650 milliGRAM(s) Oral every 6 hours PRN  ALPRAZolam 1 milliGRAM(s) Oral every 6 hours PRN  cholecalciferol 1000 Unit(s) Oral daily  diltiazem    milliGRAM(s) Oral daily  lactulose Syrup 10 Gram(s) Oral daily  melatonin 3 milliGRAM(s) Oral at bedtime PRN  metoprolol succinate  milliGRAM(s) Oral daily  morphine  - Injectable 2 milliGRAM(s) IV Push every 4 hours PRN  ondansetron Injectable 4 milliGRAM(s) IV Push every 8 hours PRN  oxycodone    5 mG/acetaminophen 325 mG 2 Tablet(s) Oral every 6 hours PRN  senna 2 Tablet(s) Oral at bedtime  zolpidem 10 milliGRAM(s) Oral at bedtime PRN      Vital Signs Last 24 Hrs  T(C): 37.1 (23 Jan 2024 11:21), Max: 37.1 (23 Jan 2024 05:21)  T(F): 98.8 (23 Jan 2024 11:21), Max: 98.8 (23 Jan 2024 11:21)  HR: 90 (23 Jan 2024 11:21) (76 - 90)  BP: 131/89 (23 Jan 2024 11:21) (127/83 - 147/99)  BP(mean): --  RR: 18 (23 Jan 2024 05:21) (17 - 18)  SpO2: 97% (23 Jan 2024 11:21) (97% - 99%)    Parameters below as of 22 Jan 2024 17:39  Patient On (Oxygen Delivery Method): room air        Physical Exam:  Constitutional: non-toxic, no distress  HEAD/EYES: anicteric, no conjunctival injection  ENT:  supple, no thrush  Cardiovascular:   normal S1, S2, no murmur, no edema  Respiratory:  clear BS bilaterally, no wheezes, no rales  GI:  soft, non-tender, normal bowel sounds,  +ostomy present  :  no davis, no CVA tenderness, meatus appears to have radiation  Musculoskeletal:  no synovitis, normal ROM  Neurologic: awake and alert x3, normal strength, no focal findings  Skin:  no rash, no erythema, no phlebitis  Heme/Onc: no lymphadenopathy   Psychiatric:  awake, alert, appropriate mood    WBC Count: 5.21 K/uL (01-20 @ 07:50)  WBC Count: 8.74 K/uL (01-19 @ 13:15)    Creatinine Trend: 1.00<--, 1.66<--, 1.29<--      MICROBIOLOGY:  v  Clean Catch Clean Catch (Midstream)  01-19-24   10,000 - 49,000 CFU/mL Pseudomonas aeruginosa  --  Pseudomonas aeruginosa      .Blood Blood-Peripheral  01-19-24   No growth at 72 Hours  --  --      .Blood Blood-Venous  01-19-24   No growth at 72 Hours  --  --      .Blood Blood-Peripheral  01-14-24   No growth at 5 days  --  --      .Blood Blood-Peripheral  01-14-24   No growth at 5 days  --  Pseudomonas aeruginosa        HIV-1 RNA Quantitative, Viral Load Log: NOT DET. lg /mL (12-11-23 @ 21:15)  HIV-1 RNA Quantitative, Viral Load Log: NOT DET. lg /mL (08-10-23 @ 14:55)  HIV-1 RNA Quantitative, Viral Load Log: NOT DET. lg /mL (07-23-23 @ 07:36)  HIV-1 RNA Quantitative, Viral Load Log: NOT DET. lg /mL (05-11-23 @ 15:18)  HIV-1 RNA Quantitative, Viral Load Log: NOT DET. lg /mL (02-09-23 @ 11:00)      SARS-CoV-2 Result: NotDetec (01-22-24 @ 18:11)    SARS-CoV-2: NotDetec (06 Oct 2023 02:40)  SARS-CoV-2: NotDetec (13 Sep 2023 21:20)    RADIOLOGY:

## 2024-01-23 NOTE — PROGRESS NOTE ADULT - NS ATTEND AMEND GEN_ALL_CORE FT
I have reviewed all pertinent clinical information and agree with the NP's note.   continue cefepime for 7 days total  No PO option available   For HIV can continue Biktarvy (Bictegravir/emtricitibine/tenofavir alafenamide)   he should continue to follow with his HIV provider outpatient  no need for HIV VL or T cell subset, well controlled in December 2023  no objection to discharge upon completion of antibiotics    Will sign off. Please call with questions.     El Jones, DO  Infectious Disease Attending  Reachable via Microsoft Teams or ID office: [phone]  After 5pm/weekends please call [phone] for all inquiries and new consult(s)
In this case, we might simply consider full abx treatment course and see if it can be cleared.    He had been discharged and off abx for a few days as insurance company didn't cover the appropriate antibiotic to cover his infection.  It is rare to develop pyelo/sepsis with metallic stents, erick when I excellent position.    IF infection cannot be cleared, should have PCN's, even though normal renal function.    Right now, has thickened renal pelves bilaterally, due to iflammation/infection, so appears obstructed with persistent bilateral hydro.    Monitor cx's, Vitals

## 2024-01-23 NOTE — PROGRESS NOTE ADULT - SUBJECTIVE AND OBJECTIVE BOX
Patient seen and examined at bedside with no complaints. States his penile pain has improved.   Denies pain, nausea/ vomiting, chills, SOB, chest pain, calf tenderness.          Vital Signs Last 24 Hrs  T(F): 98.8 (01-23-24 @ 11:21), Max: 98.8 (01-23-24 @ 11:21)  HR: 90 (01-23-24 @ 11:21)  BP: 131/89 (01-23-24 @ 11:21)  RR: 18 (01-23-24 @ 05:21)  SpO2: 97% (01-23-24 @ 11:21)  Wt(kg): --   CAPILLARY BLOOD GLUCOSE          GENERAL: Alert, NAD  CHEST/LUNG: Respirations non labored, good inspiratory effort  HEART: S1S2  HEENT: NCAT, EOMI, conjunctiva clear   ABDOMEN: Nondistended, + bowel sounds, nontender  : Uncircumcised penis with no discharge at the meatus   EXTREMITIES:  No calf tenderness, no edema    I&O's Detail    22 Jan 2024 07:01  -  23 Jan 2024 07:00  --------------------------------------------------------  IN:    IV PiggyBack: 50 mL    Oral Fluid: 220 mL  Total IN: 270 mL    OUT:    Colostomy (mL): 0 mL  Total OUT: 0 mL    Total NET: 270 mL          LABS:              RADIOLOGY & ADDITIONAL STUDIES:    < from: US Testicles (01.19.24 @ 16:17) >    ACC: [id number] EXAM:  US SCROTUM AND CONTENTS   ORDERED BY: CAROLINA AYERS     PROCEDURE DATE:  01/19/2024          INTERPRETATION:  CLINICAL INFORMATION: Persistent left testicular pain    COMPARISON: None available.    TECHNIQUE: Testicular ultrasound utilizing color and spectral Doppler.    FINDINGS:    RIGHT:  Right testis: 4.1 cm x 1.6 cm x 2.9 cm. Normal echogenicity and   echotexture with no masses or areas of architectural distortion. Normal   arterial and venous blood flow pattern.  Right epididymis: Within normal limits a side from a 6 mm simple cyst.  Right hydrocele: Small.  Right varicocele: None.    LEFT:  Left testis: 3.3 x 2.8 x 2.3 cm. Normal echogenicity and echotexture with   no masses or areas of architectural distortion. Normal arterial and   venous blood flow pattern.  Left epididymis: Within normal limits.  Left hydrocele: Small.  Left varicocele: None.    IMPRESSION:    Normal testicles.    Small bilateral hydroceles    --- End of Report ---            SALOME BALTAZAR; Attending Radiologist  This document has been electronically signed. Jan 19 2024  4:53PM    < end of copied text >  < from: US Renal (01.19.24 @ 15:10) >    ACC: [id number] EXAM:  US KIDNEY(S)   ORDERED BY: CAROLINA AYERS     PROCEDURE DATE:  01/19/2024          INTERPRETATION:  CLINICAL INFORMATION: Bilateral renal stents.    COMPARISON: CT abdomen pelvis 1/14/2024.    TECHNIQUE: Sonography of the kidneys and bladder.    FINDINGS:  Right kidney: 14.4 cm. Severe hydronephrosis as noted on prior CT.   Proximal portion of ureteral stent visualized with tip in the renal   pelvis. A 5.8 cm simple upper pole cyst.    Left kidney: 14.8 cm. severe hydronephrosis. Proximal portion of a   ureteral stent is visualized with tip in the renal pelvis.. A 5.1 cm   simple upper pole cyst. A 1.4 cm lower pole simple cyst.    Urinary bladder: Distal portion of bilateral ureteral stents noted in the   bladder lumen. Prevoid bladder volume of 165 cc.. Bilateral ureteral jets   are seen.    IMPRESSION:  Severe bilateral hydronephrosis.  Bilateral ureteral stents grossly in satisfactory position. Correlate for   stent malfunction.    --- End of Report ---            SALOME MISHRA MD; Attending Radiologist  This document has been electronically signed. Jan 19 2024  3:33PM    < end of copied text >  < from: CT Abdomen and Pelvis w/ IV Cont (01.14.24 @ 13:03) >      < end of copied text >       Patient seen and examined at bedside with no complaints. States his penile pain has improved. He states that he does not follow an outpatient oncologist, rather his PCP to manage his cancer.   Denies pain, nausea/ vomiting, chills, SOB, chest pain, calf tenderness.          Vital Signs Last 24 Hrs  T(F): 98.8 (01-23-24 @ 11:21), Max: 98.8 (01-23-24 @ 11:21)  HR: 90 (01-23-24 @ 11:21)  BP: 131/89 (01-23-24 @ 11:21)  RR: 18 (01-23-24 @ 05:21)  SpO2: 97% (01-23-24 @ 11:21)  Wt(kg): --   CAPILLARY BLOOD GLUCOSE          GENERAL: Alert, NAD  CHEST/LUNG: Respirations non labored, good inspiratory effort  HEART: S1S2  HEENT: NCAT, EOMI, conjunctiva clear   ABDOMEN: Nondistended, + bowel sounds, nontender  : Uncircumcised penis with no discharge at the meatus   EXTREMITIES:  No calf tenderness, no edema    I&O's Detail    22 Jan 2024 07:01  -  23 Jan 2024 07:00  --------------------------------------------------------  IN:    IV PiggyBack: 50 mL    Oral Fluid: 220 mL  Total IN: 270 mL    OUT:    Colostomy (mL): 0 mL  Total OUT: 0 mL    Total NET: 270 mL          LABS:              RADIOLOGY & ADDITIONAL STUDIES:    < from: US Testicles (01.19.24 @ 16:17) >    ACC: [id number] EXAM:  US SCROTUM AND CONTENTS   ORDERED BY: CAROLINA AYERS     PROCEDURE DATE:  01/19/2024          INTERPRETATION:  CLINICAL INFORMATION: Persistent left testicular pain    COMPARISON: None available.    TECHNIQUE: Testicular ultrasound utilizing color and spectral Doppler.    FINDINGS:    RIGHT:  Right testis: 4.1 cm x 1.6 cm x 2.9 cm. Normal echogenicity and   echotexture with no masses or areas of architectural distortion. Normal   arterial and venous blood flow pattern.  Right epididymis: Within normal limits a side from a 6 mm simple cyst.  Right hydrocele: Small.  Right varicocele: None.    LEFT:  Left testis: 3.3 x 2.8 x 2.3 cm. Normal echogenicity and echotexture with   no masses or areas of architectural distortion. Normal arterial and   venous blood flow pattern.  Left epididymis: Within normal limits.  Left hydrocele: Small.  Left varicocele: None.    IMPRESSION:    Normal testicles.    Small bilateral hydroceles    --- End of Report ---            SALOME BALTAZAR; Attending Radiologist  This document has been electronically signed. Jan 19 2024  4:53PM    < end of copied text >  < from: US Renal (01.19.24 @ 15:10) >    ACC: [id number] EXAM:  US KIDNEY(S)   ORDERED BY: CAROLINA AYERS     PROCEDURE DATE:  01/19/2024          INTERPRETATION:  CLINICAL INFORMATION: Bilateral renal stents.    COMPARISON: CT abdomen pelvis 1/14/2024.    TECHNIQUE: Sonography of the kidneys and bladder.    FINDINGS:  Right kidney: 14.4 cm. Severe hydronephrosis as noted on prior CT.   Proximal portion of ureteral stent visualized with tip in the renal   pelvis. A 5.8 cm simple upper pole cyst.    Left kidney: 14.8 cm. severe hydronephrosis. Proximal portion of a   ureteral stent is visualized with tip in the renal pelvis.. A 5.1 cm   simple upper pole cyst. A 1.4 cm lower pole simple cyst.    Urinary bladder: Distal portion of bilateral ureteral stents noted in the   bladder lumen. Prevoid bladder volume of 165 cc.. Bilateral ureteral jets   are seen.    IMPRESSION:  Severe bilateral hydronephrosis.  Bilateral ureteral stents grossly in satisfactory position. Correlate for   stent malfunction.    --- End of Report ---            SALOME MISHRA MD; Attending Radiologist  This document has been electronically signed. Jan 19 2024  3:33PM    < end of copied text >  < from: CT Abdomen and Pelvis w/ IV Cont (01.14.24 @ 13:03) >      < end of copied text >

## 2024-01-24 LAB
ANION GAP SERPL CALC-SCNC: 6 MMOL/L — SIGNIFICANT CHANGE UP (ref 5–17)
BUN SERPL-MCNC: 18 MG/DL — SIGNIFICANT CHANGE UP (ref 7–23)
CALCIUM SERPL-MCNC: 9.2 MG/DL — SIGNIFICANT CHANGE UP (ref 8.5–10.1)
CHLORIDE SERPL-SCNC: 107 MMOL/L — SIGNIFICANT CHANGE UP (ref 96–108)
CO2 SERPL-SCNC: 26 MMOL/L — SIGNIFICANT CHANGE UP (ref 22–31)
CREAT SERPL-MCNC: 1.04 MG/DL — SIGNIFICANT CHANGE UP (ref 0.5–1.3)
CULTURE RESULTS: SIGNIFICANT CHANGE UP
CULTURE RESULTS: SIGNIFICANT CHANGE UP
EGFR: 84 ML/MIN/1.73M2 — SIGNIFICANT CHANGE UP
GLUCOSE SERPL-MCNC: 104 MG/DL — HIGH (ref 70–99)
HCT VFR BLD CALC: 33.9 % — LOW (ref 39–50)
HGB BLD-MCNC: 11.4 G/DL — LOW (ref 13–17)
MCHC RBC-ENTMCNC: 28.9 PG — SIGNIFICANT CHANGE UP (ref 27–34)
MCHC RBC-ENTMCNC: 33.6 G/DL — SIGNIFICANT CHANGE UP (ref 32–36)
MCV RBC AUTO: 85.8 FL — SIGNIFICANT CHANGE UP (ref 80–100)
NRBC # BLD: 0 /100 WBCS — SIGNIFICANT CHANGE UP (ref 0–0)
PLATELET # BLD AUTO: 467 K/UL — HIGH (ref 150–400)
POTASSIUM SERPL-MCNC: 4 MMOL/L — SIGNIFICANT CHANGE UP (ref 3.5–5.3)
POTASSIUM SERPL-SCNC: 4 MMOL/L — SIGNIFICANT CHANGE UP (ref 3.5–5.3)
RBC # BLD: 3.95 M/UL — LOW (ref 4.2–5.8)
RBC # FLD: 13.6 % — SIGNIFICANT CHANGE UP (ref 10.3–14.5)
SODIUM SERPL-SCNC: 139 MMOL/L — SIGNIFICANT CHANGE UP (ref 135–145)
SPECIMEN SOURCE: SIGNIFICANT CHANGE UP
SPECIMEN SOURCE: SIGNIFICANT CHANGE UP
WBC # BLD: 7.67 K/UL — SIGNIFICANT CHANGE UP (ref 3.8–10.5)
WBC # FLD AUTO: 7.67 K/UL — SIGNIFICANT CHANGE UP (ref 3.8–10.5)

## 2024-01-24 PROCEDURE — 99232 SBSQ HOSP IP/OBS MODERATE 35: CPT

## 2024-01-24 RX ADMIN — BICTEGRAVIR SODIUM, EMTRICITABINE, AND TENOFOVIR ALAFENAMIDE FUMARATE 1 TABLET(S): 30; 120; 15 TABLET ORAL at 12:21

## 2024-01-24 RX ADMIN — MORPHINE SULFATE 2 MILLIGRAM(S): 50 CAPSULE, EXTENDED RELEASE ORAL at 11:05

## 2024-01-24 RX ADMIN — MORPHINE SULFATE 2 MILLIGRAM(S): 50 CAPSULE, EXTENDED RELEASE ORAL at 19:00

## 2024-01-24 RX ADMIN — Medication 100 MILLIGRAM(S): at 05:27

## 2024-01-24 RX ADMIN — MORPHINE SULFATE 2 MILLIGRAM(S): 50 CAPSULE, EXTENDED RELEASE ORAL at 10:33

## 2024-01-24 RX ADMIN — CEFEPIME 100 MILLIGRAM(S): 1 INJECTION, POWDER, FOR SOLUTION INTRAMUSCULAR; INTRAVENOUS at 05:26

## 2024-01-24 RX ADMIN — LACTULOSE 10 GRAM(S): 10 SOLUTION ORAL at 12:22

## 2024-01-24 RX ADMIN — MORPHINE SULFATE 2 MILLIGRAM(S): 50 CAPSULE, EXTENDED RELEASE ORAL at 18:24

## 2024-01-24 RX ADMIN — MORPHINE SULFATE 2 MILLIGRAM(S): 50 CAPSULE, EXTENDED RELEASE ORAL at 05:41

## 2024-01-24 RX ADMIN — MORPHINE SULFATE 2 MILLIGRAM(S): 50 CAPSULE, EXTENDED RELEASE ORAL at 05:26

## 2024-01-24 RX ADMIN — Medication 240 MILLIGRAM(S): at 05:27

## 2024-01-24 RX ADMIN — SENNA PLUS 2 TABLET(S): 8.6 TABLET ORAL at 21:15

## 2024-01-24 RX ADMIN — Medication 1000 UNIT(S): at 12:21

## 2024-01-24 RX ADMIN — CEFEPIME 100 MILLIGRAM(S): 1 INJECTION, POWDER, FOR SOLUTION INTRAMUSCULAR; INTRAVENOUS at 18:10

## 2024-01-24 NOTE — PROGRESS NOTE ADULT - ASSESSMENT
58 year old male with PMHx of HTN, HLD, anal CA (s/p chemotherapy, XRT, resection and colostomy creation in 3/2022), ureteral stricture (s/p b/l ureteral JJ stent with last exchange by Dr. Mathew in 12/2023), HIV, and anxiety who presented to the ED on 1/19/24 for being called back by ER and admitted for Pseudomonas UTI.    Patient reports he has been having persistent fevers at home since 1/3/24. Also complaints of dysuria and urinary frequency for the past year so he did not think anything of his urinary symptoms. However, he procrastinated because he did not want to come to the ER as he is frequently hospitalized and wanted to stay home if possible. Came to ER on 1/14/24. Found to have fever, UTI, and moderate bilateral hydronephrosis on CT. U/S testicles normal at that time. Evaluated by urology. Symptoms improved after IV antibiotics and discharged home on cefpodoxime 200 mg BID. Patient reports his insurance requires a pre-authorization for cefpodoxime so he was unable to pick it up from [facility] pharmacy. Went to see urologist, Dr. Mathew on 1/16/24 and wife states they stayed in his office until "any antibiotic was sent to [facility] and it was confirmed." Started on Bactrim but with persistent symptoms. Last night, received a phone call from someone in the ER advising him to come back as his urine culture returned for Pseudomonas.       Pseudomonas UTI  U/A with large blood, large leuks, +nitrites, WBC TNTC, RBC TNTC, many bacteria  S/p cefepime 1 g IV and acetaminophen 1 g IV in the ED  U/S renal with severe b/l hydronephrosis and b/l ureteral stents grossly in satisfactory position. U/S testicles with normal testicles and small b/l hydroceles. Received 1L NS bolus, cefepime 1 g IV, acetaminophen 1 g IV.   patient voiding well on his own   ID consult appreciated   per urology, no need  for stent exchange >> Once infection clears, they can do PCN if needed vd dc home   Cefepime 2 g q12 continued will need 7 days (end date 1/26/24), no PO option per sensitivities ; unless urologic procedure pending       KATY,  POA >>improved   S/p 1L NS bolus in the ED  Avoid nephrotoxins, renally dose meds  bladder scan PVR 0cc   Urology following  avoid nephrotoxic meds, dose all meds per eGFR        Thrombocytosis, likely reactive    HTN: PTA metoprolol succinate 100 mg, diltiazem  mg  HLD: PTA ezetimibe 10 mg held given not on formulary  HIV: last viral load undetectable (on 12/11/23), CD4 579: PTA bictegravir / emtricitabine / tenofovir 50 / 200 / 25 mg; outpatient follow-up with [facility]   Anxiety: controlled,  PTA alprazolam 1 mg q6 PRN  Chronic abdominal pain: outpatient palliative care notes reviewed,  checked, PTA morphine 15 mg q6 PRN  Opiate-induced constipation: PTA lactulose 10 g daily, senna 2 tabs qhs  Normocytic anemia, appears chronic: hgb 10.8 on admission, appears around baseline, no signs of bleeding, H/H stable     Medication reconciliation completed by my colleague  using med list provided by patient.

## 2024-01-24 NOTE — PROGRESS NOTE ADULT - SUBJECTIVE AND OBJECTIVE BOX
SURGERY PROGRESS HPI:  Pt seen and examined at bedside.  Resting comfortably.  Denies complaints.      Vital Signs Last 24 Hrs  T(C): 36.4 (24 Jan 2024 10:52), Max: 36.9 (23 Jan 2024 17:19)  T(F): 97.5 (24 Jan 2024 10:52), Max: 98.4 (23 Jan 2024 17:19)  HR: 78 (24 Jan 2024 10:52) (59 - 82)  BP: 120/71 (24 Jan 2024 10:52) (120/71 - 140/94)  BP(mean): --  RR: 17 (24 Jan 2024 10:52) (16 - 18)  SpO2: 97% (24 Jan 2024 10:52) (96% - 99%)    Parameters below as of 24 Jan 2024 10:52  Patient On (Oxygen Delivery Method): room air          PHYSICAL EXAM:  GENERAL: Alert, NAD  CHEST/LUNG: Respirations non labored, good inspiratory effort  HEART: S1S2  ABDOMEN: Nondistended, nontender  : Uncircumcised penis with no discharge at the meatus   EXTREMITIES:  No calf tenderness, no edema    I&O's Detail    23 Jan 2024 07:01  -  24 Jan 2024 07:00  --------------------------------------------------------  IN:    IV PiggyBack: 50 mL    Oral Fluid: 220 mL  Total IN: 270 mL    OUT:  Total OUT: 0 mL    Total NET: 270 mL

## 2024-01-24 NOTE — PROGRESS NOTE ADULT - SUBJECTIVE AND OBJECTIVE BOX
PROGRESS NOTE:     Patient is a 58y old  Male who presents with a chief complaint of Pseudomonas UTI, KATY (20 Jan 2024 16:41)        SUBJECTIVE & OBJECTIVE:   Pt seen and examined at bedside in AM    no overnight events.   no new complaints      REVIEW OF SYSTEMS: remaining ROS negative     PHYSICAL EXAM:    Vital Signs Last 24 Hrs  T(C): 36.4 (24 Jan 2024 10:52), Max: 36.9 (23 Jan 2024 17:19)  T(F): 97.5 (24 Jan 2024 10:52), Max: 98.4 (23 Jan 2024 17:19)  HR: 78 (24 Jan 2024 10:52) (59 - 82)  BP: 120/71 (24 Jan 2024 10:52) (120/71 - 140/94)  BP(mean): --  RR: 17 (24 Jan 2024 10:52) (16 - 18)  SpO2: 97% (24 Jan 2024 10:52) (96% - 99%)    Parameters below as of 24 Jan 2024 10:52  Patient On (Oxygen Delivery Method): room air            GENERAL: NAD, no increased WOB  HEAD:  Atraumatic, Normocephalic  EYES: EOMI, PERRLA, conjunctiva and sclera clear  ENMT: Moist mucous membranes  NECK: Supple, No JVD  NERVOUS SYSTEM:  Alert & Oriented X3, no focal neuro deficits   CHEST/LUNG: Clear to auscultation bilaterally; No rales, rhonchi, wheezing, or rubs  HEART: Regular rate and rhythm; No murmurs, rubs, or gallops  ABDOMEN: Soft, Nontender, Nondistended; Bowel sounds present; + Suprapubic pain on palpation >>resolved   EXTREMITIES:  2+ Peripheral Pulses b/l, No clubbing, cyanosis, calf tenderness or edema b/l    MEDICATIONS  (STANDING):  bictegravir 50 mG/emtricitabine 200 mG/tenofovir alafenamide 25 mG (BIKTARVY) 1 Tablet(s) Oral daily  cefepime   IVPB 2000 milliGRAM(s) IV Intermittent every 12 hours  cholecalciferol 1000 Unit(s) Oral daily  diltiazem    milliGRAM(s) Oral daily  lactulose Syrup 10 Gram(s) Oral daily  metoprolol succinate  milliGRAM(s) Oral daily  senna 2 Tablet(s) Oral at bedtime    MEDICATIONS  (PRN):  acetaminophen     Tablet .. 650 milliGRAM(s) Oral every 6 hours PRN Temp greater or equal to 38C (100.4F), Mild Pain (1 - 3)  ALPRAZolam 1 milliGRAM(s) Oral every 6 hours PRN anxiety  melatonin 3 milliGRAM(s) Oral at bedtime PRN Insomnia  morphine  - Injectable 2 milliGRAM(s) IV Push every 4 hours PRN for breakthrough pain  ondansetron Injectable 4 milliGRAM(s) IV Push every 8 hours PRN Nausea and/or Vomiting  oxycodone    5 mG/acetaminophen 325 mG 2 Tablet(s) Oral every 6 hours PRN Severe Pain (7 - 10)  zolpidem 10 milliGRAM(s) Oral at bedtime PRN Insomnia    LABS:                        no new labs       Urinalysis Basic - ( 20 Jan 2024 07:50 )    Color: x / Appearance: x / SG: x / pH: x  Gluc: 97 mg/dL / Ketone: x  / Bili: x / Urobili: x   Blood: x / Protein: x / Nitrite: x   Leuk Esterase: x / RBC: x / WBC x   Sq Epi: x / Non Sq Epi: x / Bacteria: x        CAPILLARY BLOOD GLUCOSE                RECENT CULTURES:  Urine culture:  01-19 @ 13:27 --   10,000 - 49,000 CFU/mL Gram Negative Rods  Urine culture:  01-19 @ 13:15 --   No growth at 24 hours  Urine culture:  01-19 @ 13:05 --   No growth at 24 hours    Clean Catch Clean Catch (Midstream)  01-19 @ 13:27   10,000 - 49,000 CFU/mL Gram Negative Rods  --  --      .Blood Blood-Peripheral  01-19 @ 13:15   No growth at 24 hours  --  --      .Blood Blood-Venous  01-19 @ 13:05   No growth at 24 hours  --  --      .Blood Blood-Peripheral  01-14 @ 11:25   No growth at 5 days  --  --      .Blood Blood-Peripheral  01-14 @ 11:16   No growth at 5 days  --  Pseudomonas aeruginosa      .Urine  12-11 @ 21:15   <10,000 CFU/mL Normal Urogenital Genet  --  --      Clean Catch Clean Catch (Midstream)  12-01 @ 12:05   <10,000 CFU/mL Normal Urogenital Genet  --  --            RADIOLOGY & ADDITIONAL TESTS:          Radiology reports read and imaging reviewed  :  [ x] YES  [ ] NO  (I am not a radiologist and therefore rely on Radiologist reports to facilitate with diagnosis and treatment plans)    Consultant(s) Notes Reviewed:  [x ] YES  [ ] NO    Care Discussed with Consultants/Other Providers [x ] YES  [ ] NO  Care plan and all findings were discussed in detail with patient.  All questions and concerns addressed

## 2024-01-24 NOTE — PROGRESS NOTE ADULT - ASSESSMENT
59 Y/O male with a PMHx of Anal ca, prostate ca s/p radiation therapy, HTN, HLD, with a PSHx of Suarez's, stent placement and most recent stent exchange with Dr. Mathew on 12/13/2023, with recent evaluation in [facility] ED for fevers found to have a UTI and started on antibiotics.  Patient returns to ED for admission with IV Abx after urine culture resulted.    Found with small left hydrocele and severe bilateral hydronephrosis on US. Bilateral ureteral stents in place. No leukocytosis, KATY resolved.   Ucx: pseudomonas. ABx sensitive to cefipime.      PLAN:     - appreciate ID recs, continue ABx per ID   - with metal stents no indication for stent exchange or removal  - if infection persists may need PCN's, will monitor and see how pt responds to abx course  - discussed with Dr Mathew

## 2024-01-25 PROCEDURE — 99232 SBSQ HOSP IP/OBS MODERATE 35: CPT

## 2024-01-25 RX ADMIN — CEFEPIME 100 MILLIGRAM(S): 1 INJECTION, POWDER, FOR SOLUTION INTRAMUSCULAR; INTRAVENOUS at 17:24

## 2024-01-25 RX ADMIN — ZOLPIDEM TARTRATE 10 MILLIGRAM(S): 10 TABLET ORAL at 21:38

## 2024-01-25 RX ADMIN — MORPHINE SULFATE 2 MILLIGRAM(S): 50 CAPSULE, EXTENDED RELEASE ORAL at 21:00

## 2024-01-25 RX ADMIN — MORPHINE SULFATE 2 MILLIGRAM(S): 50 CAPSULE, EXTENDED RELEASE ORAL at 16:58

## 2024-01-25 RX ADMIN — MORPHINE SULFATE 2 MILLIGRAM(S): 50 CAPSULE, EXTENDED RELEASE ORAL at 12:19

## 2024-01-25 RX ADMIN — Medication 100 MILLIGRAM(S): at 05:17

## 2024-01-25 RX ADMIN — MORPHINE SULFATE 2 MILLIGRAM(S): 50 CAPSULE, EXTENDED RELEASE ORAL at 11:05

## 2024-01-25 RX ADMIN — CEFEPIME 100 MILLIGRAM(S): 1 INJECTION, POWDER, FOR SOLUTION INTRAMUSCULAR; INTRAVENOUS at 05:16

## 2024-01-25 RX ADMIN — MORPHINE SULFATE 2 MILLIGRAM(S): 50 CAPSULE, EXTENDED RELEASE ORAL at 15:29

## 2024-01-25 RX ADMIN — Medication 240 MILLIGRAM(S): at 05:17

## 2024-01-25 RX ADMIN — MORPHINE SULFATE 2 MILLIGRAM(S): 50 CAPSULE, EXTENDED RELEASE ORAL at 20:01

## 2024-01-25 RX ADMIN — SENNA PLUS 2 TABLET(S): 8.6 TABLET ORAL at 21:37

## 2024-01-25 NOTE — PROGRESS NOTE ADULT - SUBJECTIVE AND OBJECTIVE BOX
PROGRESS NOTE:     Patient is a 58y old  Male who presents with a chief complaint of Pseudomonas UTI, KATY (20 Jan 2024 16:41)        SUBJECTIVE & OBJECTIVE:   Pt seen and examined at bedside in AM    no overnight events.   no new complaints      REVIEW OF SYSTEMS: remaining ROS negative     MEDICATIONS  (STANDING):  bictegravir 50 mG/emtricitabine 200 mG/tenofovir alafenamide 25 mG (BIKTARVY) 1 Tablet(s) Oral daily  cefepime   IVPB 2000 milliGRAM(s) IV Intermittent every 12 hours  cholecalciferol 1000 Unit(s) Oral daily  diltiazem    milliGRAM(s) Oral daily  lactulose Syrup 10 Gram(s) Oral daily  metoprolol succinate  milliGRAM(s) Oral daily  senna 2 Tablet(s) Oral at bedtime    MEDICATIONS  (PRN):  acetaminophen     Tablet .. 650 milliGRAM(s) Oral every 6 hours PRN Temp greater or equal to 38C (100.4F), Mild Pain (1 - 3)  ALPRAZolam 1 milliGRAM(s) Oral every 6 hours PRN anxiety  melatonin 3 milliGRAM(s) Oral at bedtime PRN Insomnia  morphine  - Injectable 2 milliGRAM(s) IV Push every 4 hours PRN for breakthrough pain  ondansetron Injectable 4 milliGRAM(s) IV Push every 8 hours PRN Nausea and/or Vomiting  oxycodone    5 mG/acetaminophen 325 mG 2 Tablet(s) Oral every 6 hours PRN Severe Pain (7 - 10)  zolpidem 10 milliGRAM(s) Oral at bedtime PRN Insomnia      PHYSICAL EXAM:    Vital Signs Last 24 Hrs  T(C): 36.7 (01-25-24 @ 11:34), Max: 37.1 (01-25-24 @ 05:07)  T(F): 98 (01-25-24 @ 11:34), Max: 98.8 (01-25-24 @ 05:07)  HR: 78 (01-25-24 @ 11:34) (78 - 88)  BP: 112/64 (01-25-24 @ 11:34) (112/64 - 141/89)  BP(mean): --  RR: 18 (01-25-24 @ 11:34) (17 - 18)  SpO2: 98% (01-25-24 @ 11:34) (97% - 98%)                GENERAL: NAD, no increased WOB  HEAD:  Atraumatic, Normocephalic  EYES: EOMI, PERRLA, conjunctiva and sclera clear  ENMT: Moist mucous membranes  NECK: Supple, No JVD  NERVOUS SYSTEM:  Alert & Oriented X3, no focal neuro deficits   CHEST/LUNG: Clear to auscultation bilaterally; No rales, rhonchi, wheezing, or rubs  HEART: Regular rate and rhythm; No murmurs, rubs, or gallops  ABDOMEN: Soft, Nontender, Nondistended; Bowel sounds present; + Suprapubic pain on palpation >>resolved   EXTREMITIES:  2+ Peripheral Pulses b/l, No clubbing, cyanosis, calf tenderness or edema b/l      LABS:                        no new labs       Urinalysis Basic - ( 20 Jan 2024 07:50 )    Color: x / Appearance: x / SG: x / pH: x  Gluc: 97 mg/dL / Ketone: x  / Bili: x / Urobili: x   Blood: x / Protein: x / Nitrite: x   Leuk Esterase: x / RBC: x / WBC x   Sq Epi: x / Non Sq Epi: x / Bacteria: x        CAPILLARY BLOOD GLUCOSE                RECENT CULTURES:  Urine culture:  01-19 @ 13:27 --   10,000 - 49,000 CFU/mL Gram Negative Rods  Urine culture:  01-19 @ 13:15 --   No growth at 24 hours  Urine culture:  01-19 @ 13:05 --   No growth at 24 hours    Clean Catch Clean Catch (Midstream)  01-19 @ 13:27   10,000 - 49,000 CFU/mL Gram Negative Rods  --  --      .Blood Blood-Peripheral  01-19 @ 13:15   No growth at 24 hours  --  --      .Blood Blood-Venous  01-19 @ 13:05   No growth at 24 hours  --  --      .Blood Blood-Peripheral  01-14 @ 11:25   No growth at 5 days  --  --      .Blood Blood-Peripheral  01-14 @ 11:16   No growth at 5 days  --  Pseudomonas aeruginosa      .Urine  12-11 @ 21:15   <10,000 CFU/mL Normal Urogenital Genet  --  --      Clean Catch Clean Catch (Midstream)  12-01 @ 12:05   <10,000 CFU/mL Normal Urogenital Genet  --  --            RADIOLOGY & ADDITIONAL TESTS:          Radiology reports read and imaging reviewed  :  [ x] YES  [ ] NO  (I am not a radiologist and therefore rely on Radiologist reports to facilitate with diagnosis and treatment plans)    Consultant(s) Notes Reviewed:  [x ] YES  [ ] NO    Care Discussed with Consultants/Other Providers [x ] YES  [ ] NO  Care plan and all findings were discussed in detail with patient.  All questions and concerns addressed

## 2024-01-25 NOTE — PROGRESS NOTE ADULT - ASSESSMENT
58 year old male with PMHx of HTN, HLD, anal CA (s/p chemotherapy, XRT, resection and colostomy creation in 3/2022), ureteral stricture (s/p b/l ureteral JJ stent with last exchange by Dr. Mathew in 12/2023), HIV, and anxiety who presented to the ED on 1/19/24 for being called back by ER and admitted for Pseudomonas UTI.    Patient reports he has been having persistent fevers at home since 1/3/24. Also complaints of dysuria and urinary frequency for the past year so he did not think anything of his urinary symptoms. However, he procrastinated because he did not want to come to the ER as he is frequently hospitalized and wanted to stay home if possible. Came to ER on 1/14/24. Found to have fever, UTI, and moderate bilateral hydronephrosis on CT. U/S testicles normal at that time. Evaluated by urology. Symptoms improved after IV antibiotics and discharged home on cefpodoxime 200 mg BID. Patient reports his insurance requires a pre-authorization for cefpodoxime so he was unable to pick it up from [facility] pharmacy. Went to see urologist, Dr. Mathew on 1/16/24 and wife states they stayed in his office until "any antibiotic was sent to [facility] and it was confirmed." Started on Bactrim but with persistent symptoms. Last night, received a phone call from someone in the ER advising him to come back as his urine culture returned for Pseudomonas.       Pseudomonas UTI  U/A with large blood, large leuks, +nitrites, WBC TNTC, RBC TNTC, many bacteria  S/p cefepime 1 g IV and acetaminophen 1 g IV in the ED  U/S renal with severe b/l hydronephrosis and b/l ureteral stents grossly in satisfactory position. U/S testicles with normal testicles and small b/l hydroceles. Received 1L NS bolus, cefepime 1 g IV, acetaminophen 1 g IV.   patient voiding well on his own   ID consult appreciated   per urology, no need  for stent exchange >> Once infection clears, they can do PCN if needed vs dc home   Cefepime 2 g q12 continued will need 7 days (end date 1/26/24), no PO option per sensitivities   likely dc tomorrow     KATY,  POA >>improved   S/p 1L NS bolus in the ED  Avoid nephrotoxins, renally dose meds  bladder scan PVR 0cc   Urology following  avoid nephrotoxic meds, dose all meds per eGFR        Thrombocytosis, likely reactive    HTN: PTA metoprolol succinate 100 mg, diltiazem  mg  HLD: PTA ezetimibe 10 mg held given not on formulary  HIV: last viral load undetectable (on 12/11/23), CD4 579: PTA bictegravir / emtricitabine / tenofovir 50 / 200 / 25 mg; outpatient follow[facility]up with [facility]   Anxiety: controlled,  PTA alprazolam 1 mg q6 PRN  Chronic abdominal pain: outpatient palliative care notes reviewed,  checked, PTA morphine 15 mg q6 PRN  Opiate-induced constipation: PTA lactulose 10 g daily, senna 2 tabs qhs  Normocytic anemia, appears chronic: hgb 10.8 on admission, appears around baseline, no signs of bleeding, H/H stable     Medication reconciliation completed by my colleague  using med list provided by patient.

## 2024-01-25 NOTE — PROGRESS NOTE ADULT - ASSESSMENT
57 Y/O male with a PMHx of Anal ca, prostate ca s/p radiation therapy, HTN, HLD, with a PSHx of Suarez's, stent placement and most recent stent exchange with Dr. Mathew on 12/13/2023, with recent evaluation in [facility] ED for fevers found to have a UTI and started on antibiotics.  Patient returns to ED for admission with IV Abx after urine culture resulted.    Found with small left hydrocele and severe bilateral hydronephrosis on US. Bilateral ureteral stents in place. No leukocytosis, KATY resolved.   Ucx: pseudomonas. ABx sensitive to cefipime.    Pt had not taken antibiotics when he was originally dischared  with metal stents no indication for stent exchange or removal    pt improving with antibiotics, per ID tomorrow is last day.  If continues to improved can go home with OP f/u in 1-2 weeks for repeat urine culture.

## 2024-01-25 NOTE — PROGRESS NOTE ADULT - SUBJECTIVE AND OBJECTIVE BOX
SURGERY PROGRESS HPI:  Pt seen and examined at bedside. Pain is well controlled on pain medication. Pt denies complaints. No acute events overnight. Pt tolerating diet. Pt denies nausea and vomiting. Voiding well.  Vital Signs Last 24 Hrs  T(C): 36.7 (25 Jan 2024 11:34), Max: 37.1 (25 Jan 2024 05:07)  T(F): 98 (25 Jan 2024 11:34), Max: 98.8 (25 Jan 2024 05:07)  HR: 78 (25 Jan 2024 11:34) (78 - 88)  BP: 112/64 (25 Jan 2024 11:34) (112/64 - 123/84)  BP(mean): --  RR: 18 (25 Jan 2024 11:34) (17 - 18)  SpO2: 98% (25 Jan 2024 11:34) (97% - 98%)    Parameters below as of 25 Jan 2024 11:34  Patient On (Oxygen Delivery Method): room air          PHYSICAL EXAM:    GENERAL: NAD  HEAD:  Atraumatic, Normocephalic  CHEST/LUNG: normal work of breathing  HEART: RRR   ABDOMEN: non distended, soft, non tender, no guarding, midline scar, ostomy with stool in bag      I&O's Detail    24 Jan 2024 07:01  -  25 Jan 2024 07:00  --------------------------------------------------------  IN:    IV PiggyBack: 50 mL    Oral Fluid: 300 mL  Total IN: 350 mL    OUT:    Colostomy (mL): 50 mL  Total OUT: 50 mL    Total NET: 300 mL          LABS:                        11.4   7.67  )-----------( 467      ( 24 Jan 2024 08:00 )             33.9     01-24    139  |  107  |  18  ----------------------------<  104<H>  4.0   |  26  |  1.04    Ca    9.2      24 Jan 2024 08:00        Urinalysis Basic - ( 24 Jan 2024 08:00 )    Color: x / Appearance: x / SG: x / pH: x  Gluc: 104 mg/dL / Ketone: x  / Bili: x / Urobili: x   Blood: x / Protein: x / Nitrite: x   Leuk Esterase: x / RBC: x / WBC x   Sq Epi: x / Non Sq Epi: x / Bacteria: x

## 2024-01-25 NOTE — PROGRESS NOTE ADULT - REASON FOR ADMISSION
Pseudomonas UTI, KATY

## 2024-01-25 NOTE — PROGRESS NOTE ADULT - PROVIDER SPECIALTY LIST ADULT
Hospitalist
Urology
Hospitalist
Urology
Infectious Disease
Urology
Urology
Hospitalist

## 2024-01-26 ENCOUNTER — TRANSCRIPTION ENCOUNTER (OUTPATIENT)
Age: 59
End: 2024-01-26

## 2024-01-26 VITALS
TEMPERATURE: 99 F | RESPIRATION RATE: 18 BRPM | SYSTOLIC BLOOD PRESSURE: 124 MMHG | HEART RATE: 89 BPM | DIASTOLIC BLOOD PRESSURE: 97 MMHG | OXYGEN SATURATION: 98 %

## 2024-01-26 PROCEDURE — 99231 SBSQ HOSP IP/OBS SF/LOW 25: CPT

## 2024-01-26 PROCEDURE — 99239 HOSP IP/OBS DSCHRG MGMT >30: CPT

## 2024-01-26 RX ORDER — EZETIMIBE 10 MG/1
1 TABLET ORAL
Refills: 0 | DISCHARGE

## 2024-01-26 RX ADMIN — MORPHINE SULFATE 2 MILLIGRAM(S): 50 CAPSULE, EXTENDED RELEASE ORAL at 10:45

## 2024-01-26 RX ADMIN — Medication 100 MILLIGRAM(S): at 05:13

## 2024-01-26 RX ADMIN — MORPHINE SULFATE 2 MILLIGRAM(S): 50 CAPSULE, EXTENDED RELEASE ORAL at 02:35

## 2024-01-26 RX ADMIN — MORPHINE SULFATE 2 MILLIGRAM(S): 50 CAPSULE, EXTENDED RELEASE ORAL at 09:45

## 2024-01-26 RX ADMIN — Medication 240 MILLIGRAM(S): at 05:13

## 2024-01-26 RX ADMIN — BICTEGRAVIR SODIUM, EMTRICITABINE, AND TENOFOVIR ALAFENAMIDE FUMARATE 1 TABLET(S): 30; 120; 15 TABLET ORAL at 11:36

## 2024-01-26 RX ADMIN — CEFEPIME 100 MILLIGRAM(S): 1 INJECTION, POWDER, FOR SOLUTION INTRAMUSCULAR; INTRAVENOUS at 05:16

## 2024-01-26 RX ADMIN — LACTULOSE 10 GRAM(S): 10 SOLUTION ORAL at 11:36

## 2024-01-26 RX ADMIN — MORPHINE SULFATE 2 MILLIGRAM(S): 50 CAPSULE, EXTENDED RELEASE ORAL at 03:28

## 2024-01-26 NOTE — DISCHARGE NOTE PROVIDER - PROVIDER TOKENS
FREE:[LAST:[Your PCP],PHONE:[(   )    -],FAX:[(   )    -],FOLLOWUP:[2 weeks]],PROVIDER:[TOKEN:[[id number]],FOLLOWUP:[1 week]]

## 2024-01-26 NOTE — DISCHARGE NOTE PROVIDER - DETAILS OF MALNUTRITION DIAGNOSIS/DIAGNOSES
This patient has been assessed with a concern for Malnutrition and was treated during this hospitalization for the following Nutrition diagnosis/diagnoses:     -  01/26/2024: Moderate protein-calorie malnutrition

## 2024-01-26 NOTE — DIETITIAN INITIAL EVALUATION ADULT - ETIOLOGY
Inadequate protein-energy intake related to hx of anal cancer (s/p chemo, XRT, resection and colostomy creation)

## 2024-01-26 NOTE — DISCHARGE NOTE PROVIDER - CARE PROVIDER_API CALL
Your PCP,   Phone: (   )    -  Fax: (   )    -  Follow Up Time: 2 weeks    Ross Bautista  Urology  [address] [place] [address]  Phone: [phone]  Fax: [phone]  Follow Up Time: 1 week

## 2024-01-26 NOTE — DISCHARGE NOTE PROVIDER - NSDCCPCAREPLAN_GEN_ALL_CORE_FT
PRINCIPAL DISCHARGE DIAGNOSIS  Diagnosis: Acute UTI  Assessment and Plan of Treatment: You were found to have urinary tract infection and treated with IV antibiotics.  Please follow up with your PCP and Urologist after discharge for continue care.      SECONDARY DISCHARGE DIAGNOSES  Diagnosis: Bilateral hydronephrosis  Assessment and Plan of Treatment:

## 2024-01-26 NOTE — DISCHARGE NOTE NURSING/CASE MANAGEMENT/SOCIAL WORK - NSDCPEFALRISK_GEN_ALL_CORE
For information on Fall & Injury Prevention, visit: [email or web address] OR  [email or web address] OR  https://www.cdc.gov/steadi/patient.html

## 2024-01-26 NOTE — DIETITIAN INITIAL EVALUATION ADULT - OTHER INFO
Pt states mostly good appetite and good PO intake PTA. States he follows low Na diet due to hx of HTN. Pt currently consuming mostly % of meals during LOS and tolerating/managing consistency well without issues. Amenable to Ensure Plus High Protein supplement to optimize intake; prefers strawberry flavor, kitchen made aware. Denies any nausea or vomiting; states some diarrhea.  Pt reports wt loss over past year; noted below.

## 2024-01-26 NOTE — DISCHARGE NOTE NURSING/CASE MANAGEMENT/SOCIAL WORK - PATIENT PORTAL LINK FT
You can access the FollowMyHealth Patient Portal offered by [facility] by registering at the following website: [email or web address]. By joining [facility]’s FollowMyHealth portal, you will also be able to view your health information using other applications (apps) compatible with our system.

## 2024-01-26 NOTE — DISCHARGE NOTE PROVIDER - NSDCMRMEDTOKEN_GEN_ALL_CORE_FT
ALPRAZolam 1 mg oral tablet: 1 tab(s) orally every 6 hours as needed for  anxiety  Ambien 10 mg oral tablet: 1 tab(s) orally once a day (at bedtime)  bictegravir/emtricitabine/tenofovir 50 mg-200 mg-25 mg oral tablet: 1 tab(s) orally once a day  cholecalciferol 1250 mcg (50,000 intl units) oral capsule: 1 cap(s) orally once a week on Sunday  DilTIAZem (Eqv-Cardizem CD) 240 mg/24 hours oral capsule, extended release: 1 cap(s) orally once a day  ezetimibe 10 mg oral tablet: 1 tab(s) orally once a day  lactulose 10 g/15 mL oral syrup: 15 milliliter(s) orally once a day  metoprolol succinate 100 mg oral tablet, extended release: 1 tab(s) orally once a day  morphine 15 mg oral tablet: 1 tab(s) orally every 6 hours as needed for  severe pain  senna (sennosides) 8.6 mg oral tablet: 2 tab(s) orally once a day (at bedtime)

## 2024-01-26 NOTE — DISCHARGE NOTE PROVIDER - HOSPITAL COURSE
58 year old male with PMHx of HTN, HLD, anal CA (s/p chemotherapy, XRT, resection and colostomy creation in 3/2022), ureteral stricture (s/p b/l ureteral JJ stent with last exchange by Dr. Mathew in 12/2023), HIV, and anxiety who presented to the ED on 1/19/24 for being called back by ER and admitted for Pseudomonas UTI.    Patient reports he has been having persistent fevers at home since 1/3/24. Also complaints of dysuria and urinary frequency for the past year so he did not think anything of his urinary symptoms. However, he procrastinated because he did not want to come to the ER as he is frequently hospitalized and wanted to stay home if possible. Came to ER on 1/14/24. Found to have fever, UTI, and moderate bilateral hydronephrosis on CT. U/S testicles normal at that time. Evaluated by urology. Symptoms improved after IV antibiotics and discharged home on cefpodoxime 200 mg BID. Patient reports his insurance requires a pre-authorization for cefpodoxime so he was unable to pick it up from [facility] pharmacy. Went to see urologist, Dr. Mathew on 1/16/24 and wife states they stayed in his office until "any antibiotic was sent to [facility] and it was confirmed." Started on Bactrim but with persistent symptoms. Last night, received a phone call from someone in the ER advising him to come back as his urine culture returned for Pseudomonas.     #Pseudomonas UTI  -U/A with large blood, large leuks, +nitrites, WBC TNTC, RBC TNTC, many bacteria  -S/p cefepime 1 g IV and acetaminophen 1 g IV in the ED  -U/S renal with severe b/l hydronephrosis and b/l ureteral stents grossly in satisfactory position. U/S testicles with normal testicles and small b/l hydroceles. Received 1L NS bolus, -cefepime 1 g IV, acetaminophen 1 g IV.   -patient voiding well on his own   -ID consult appreciated   -per urology, no need  for stent exchange >> Once infection clears, they can do PCN if needed vs dc home   -Cefepime 2 g q12 continued will need 7 days (end date 1/26/24), no PO option per sensitivities   -Completed course of Cefepime inpatient    #KATY,  POA >>improved   -S/p 1L NS bolus in the ED  -Avoid nephrotoxins, renally dose meds  -bladder scan PVR 0cc   -Urology following  -avoid nephrotoxic meds, dose all meds per eGFR   -Patient to follow up with Urology after discharge    #Thrombocytosis, likely reactive    HTN: PTA metoprolol succinate 100 mg, diltiazem  mg  HLD: PTA ezetimibe 10 mg held given not on formulary  HIV: last viral load undetectable (on 12/11/23), CD4 579: PTA bictegravir / emtricitabine / tenofovir 50 / 200 / 25 mg; outpatient follow-up with [facility]   Anxiety: controlled,  PTA alprazolam 1 mg q6 PRN  Chronic abdominal pain: outpatient palliative care notes reviewed,  checked, PTA morphine 15 mg q6 PRN  Opiate-induced constipation: PTA lactulose 10 g daily, senna 2 tabs qhs  Normocytic anemia, appears chronic: hgb 10.8 on admission, appears around baseline, no signs of bleeding, H/H stable     Case reviewed by attending, Dr. Gibbs and patient deemed clinically stable for discharge home

## 2024-01-26 NOTE — DIETITIAN INITIAL EVALUATION ADULT - PERTINENT MEDS FT
MEDICATIONS  (STANDING):  bictegravir 50 mG/emtricitabine 200 mG/tenofovir alafenamide 25 mG (BIKTARVY) 1 Tablet(s) Oral daily  cefepime   IVPB 2000 milliGRAM(s) IV Intermittent every 12 hours  cholecalciferol 1000 Unit(s) Oral daily  diltiazem    milliGRAM(s) Oral daily  lactulose Syrup 10 Gram(s) Oral daily  metoprolol succinate  milliGRAM(s) Oral daily  senna 2 Tablet(s) Oral at bedtime    MEDICATIONS  (PRN):  acetaminophen     Tablet .. 650 milliGRAM(s) Oral every 6 hours PRN Temp greater or equal to 38C (100.4F), Mild Pain (1 - 3)  ALPRAZolam 1 milliGRAM(s) Oral every 6 hours PRN anxiety  melatonin 3 milliGRAM(s) Oral at bedtime PRN Insomnia  morphine  - Injectable 2 milliGRAM(s) IV Push every 4 hours PRN for breakthrough pain  ondansetron Injectable 4 milliGRAM(s) IV Push every 8 hours PRN Nausea and/or Vomiting  oxycodone    5 mG/acetaminophen 325 mG 2 Tablet(s) Oral every 6 hours PRN Severe Pain (7 - 10)  zolpidem 10 milliGRAM(s) Oral at bedtime PRN Insomnia

## 2024-01-26 NOTE — DIETITIAN NUTRITION RISK NOTIFICATION - TREATMENT: THE FOLLOWING DIET HAS BEEN RECOMMENDED
Diet, DASH/TLC:   Sodium & Cholesterol Restricted  Supplement Feeding Modality:  Oral  Ensure Plus High Protein Cans or Servings Per Day:  1       Frequency:  Daily (01-26-24 @ 11:53) [Pending Verification By Attending]  Diet, Regular:   DASH/TLC {Sodium & Cholesterol Restricted}  Low Sodium (01-20-24 @ 23:20) [Active]

## 2024-01-26 NOTE — DISCHARGE NOTE PROVIDER - NSDCFUSCHEDAPPT_GEN_ALL_CORE_FT
Patricia Colon  [facility] Physician [facility]  GERIATRICS [address]   Scheduled Appointment: 02/12/2024    [facility] Physician [facility]  DERM [address]  Scheduled Appointment: 02/13/2024    Noreen Sapp  [facility] Physician [facility]  PODIATRY [address] [facility] [address]  Scheduled Appointment: 02/16/2024    Robb Gonzalez  [facility] Physician [facility]  HEARTVASC [address] [facility]  Scheduled Appointment: 02/23/2024    Denise Gordon  [facility] Physician [facility]  INFDISEASE [address]  Scheduled Appointment: 03/18/2024    Jarad Schwartz  [facility] Physician [facility]  INTMED [id number]  Scheduled Appointment: 04/18/2024

## 2024-01-26 NOTE — DISCHARGE NOTE PROVIDER - CARE PROVIDERS DIRECT ADDRESSES
,DirectAddress_[email or web address]

## 2024-01-29 PROBLEM — Z85.048 PERSONAL HISTORY OF OTHER MALIGNANT NEOPLASM OF RECTUM, RECTOSIGMOID JUNCTION, AND ANUS: Chronic | Status: ACTIVE | Noted: 2024-01-20

## 2024-01-30 DIAGNOSIS — N40.0 BENIGN PROSTATIC HYPERPLASIA WITHOUT LOWER URINARY TRACT SYMPTOMS: ICD-10-CM

## 2024-01-30 DIAGNOSIS — T40.2X5A ADVERSE EFFECT OF OTHER OPIOIDS, INITIAL ENCOUNTER: ICD-10-CM

## 2024-01-30 DIAGNOSIS — F41.9 ANXIETY DISORDER, UNSPECIFIED: ICD-10-CM

## 2024-01-30 DIAGNOSIS — N13.6 PYONEPHROSIS: ICD-10-CM

## 2024-01-30 DIAGNOSIS — Z21 ASYMPTOMATIC HUMAN IMMUNODEFICIENCY VIRUS [HIV] INFECTION STATUS: ICD-10-CM

## 2024-01-30 DIAGNOSIS — I10 ESSENTIAL (PRIMARY) HYPERTENSION: ICD-10-CM

## 2024-01-30 DIAGNOSIS — N43.3 HYDROCELE, UNSPECIFIED: ICD-10-CM

## 2024-01-30 DIAGNOSIS — Z85.46 PERSONAL HISTORY OF MALIGNANT NEOPLASM OF PROSTATE: ICD-10-CM

## 2024-01-30 DIAGNOSIS — Z85.048 PERSONAL HISTORY OF OTHER MALIGNANT NEOPLASM OF RECTUM, RECTOSIGMOID JUNCTION, AND ANUS: ICD-10-CM

## 2024-01-30 DIAGNOSIS — E78.5 HYPERLIPIDEMIA, UNSPECIFIED: ICD-10-CM

## 2024-01-30 DIAGNOSIS — N17.9 ACUTE KIDNEY FAILURE, UNSPECIFIED: ICD-10-CM

## 2024-01-30 DIAGNOSIS — B96.5 PSEUDOMONAS (AERUGINOSA) (MALLEI) (PSEUDOMALLEI) AS THE CAUSE OF DISEASES CLASSIFIED ELSEWHERE: ICD-10-CM

## 2024-01-30 DIAGNOSIS — N39.0 URINARY TRACT INFECTION, SITE NOT SPECIFIED: ICD-10-CM

## 2024-01-30 DIAGNOSIS — Z93.3 COLOSTOMY STATUS: ICD-10-CM

## 2024-01-30 DIAGNOSIS — D75.838 OTHER THROMBOCYTOSIS: ICD-10-CM

## 2024-01-30 DIAGNOSIS — D64.9 ANEMIA, UNSPECIFIED: ICD-10-CM

## 2024-01-30 DIAGNOSIS — K59.03 DRUG INDUCED CONSTIPATION: ICD-10-CM

## 2024-01-30 DIAGNOSIS — E44.0 MODERATE PROTEIN-CALORIE MALNUTRITION: ICD-10-CM

## 2024-02-03 NOTE — PHYSICAL EXAM
[General Appearance - Alert] : alert [General Appearance - In No Acute Distress] : in no acute distress [Oriented To Time, Place, And Person] : oriented to person, place, and time [Affect] : the affect was normal spontaneous

## 2024-02-05 ENCOUNTER — LABORATORY RESULT (OUTPATIENT)
Age: 59
End: 2024-02-05

## 2024-02-07 ENCOUNTER — NON-APPOINTMENT (OUTPATIENT)
Age: 59
End: 2024-02-07

## 2024-02-07 LAB — T PALLIDUM AB SER QL IA: POSITIVE

## 2024-02-10 LAB — BACTERIA UR CULT: ABNORMAL

## 2024-02-10 RX ORDER — CIPROFLOXACIN HYDROCHLORIDE 500 MG/1
500 TABLET, FILM COATED ORAL
Qty: 14 | Refills: 0 | Status: ACTIVE | COMMUNITY
Start: 2024-02-10 | End: 1900-01-01

## 2024-02-12 ENCOUNTER — LABORATORY RESULT (OUTPATIENT)
Age: 59
End: 2024-02-12

## 2024-02-12 ENCOUNTER — APPOINTMENT (OUTPATIENT)
Dept: INFECTIOUS DISEASE | Facility: CLINIC | Age: 59
End: 2024-02-12
Payer: MEDICAID

## 2024-02-12 ENCOUNTER — APPOINTMENT (OUTPATIENT)
Dept: GERIATRICS | Facility: CLINIC | Age: 59
End: 2024-02-12

## 2024-02-12 ENCOUNTER — OUTPATIENT (OUTPATIENT)
Dept: OUTPATIENT SERVICES | Facility: HOSPITAL | Age: 59
LOS: 1 days | End: 2024-02-12
Payer: MEDICAID

## 2024-02-12 VITALS
DIASTOLIC BLOOD PRESSURE: 82 MMHG | OXYGEN SATURATION: 99 % | HEIGHT: 71 IN | TEMPERATURE: 97.8 F | SYSTOLIC BLOOD PRESSURE: 133 MMHG | WEIGHT: 205 LBS | HEART RATE: 118 BPM | BODY MASS INDEX: 28.7 KG/M2

## 2024-02-12 DIAGNOSIS — Z98.890 OTHER SPECIFIED POSTPROCEDURAL STATES: Chronic | ICD-10-CM

## 2024-02-12 DIAGNOSIS — Z93.3 COLOSTOMY STATUS: Chronic | ICD-10-CM

## 2024-02-12 DIAGNOSIS — B20 HUMAN IMMUNODEFICIENCY VIRUS [HIV] DISEASE: ICD-10-CM

## 2024-02-12 DIAGNOSIS — N39.0 URINARY TRACT INFECTION, SITE NOT SPECIFIED: ICD-10-CM

## 2024-02-12 DIAGNOSIS — Z96.0 PRESENCE OF UROGENITAL IMPLANTS: Chronic | ICD-10-CM

## 2024-02-12 DIAGNOSIS — A53.9 SYPHILIS, UNSPECIFIED: ICD-10-CM

## 2024-02-12 DIAGNOSIS — C21.0 MALIGNANT NEOPLASM OF ANUS, UNSPECIFIED: ICD-10-CM

## 2024-02-12 LAB
4/8 RATIO: 0.41 RATIO — LOW (ref 0.9–3.6)
ABS CD8: 1478 CELLS/UL — HIGH (ref 142–740)
ALBUMIN SERPL ELPH-MCNC: 4.3 G/DL — SIGNIFICANT CHANGE UP (ref 3.3–5)
ALP SERPL-CCNC: 105 U/L — SIGNIFICANT CHANGE UP (ref 40–120)
ALT FLD-CCNC: 15 U/L — SIGNIFICANT CHANGE UP (ref 10–45)
ANION GAP SERPL CALC-SCNC: 9 MMOL/L — SIGNIFICANT CHANGE UP (ref 5–17)
APPEARANCE UR: ABNORMAL
AST SERPL-CCNC: 11 U/L — SIGNIFICANT CHANGE UP (ref 10–40)
BACTERIA # UR AUTO: ABNORMAL /HPF
BILIRUB SERPL-MCNC: <0.2 MG/DL — SIGNIFICANT CHANGE UP (ref 0.2–1.2)
BILIRUB UR-MCNC: NEGATIVE — SIGNIFICANT CHANGE UP
BUN SERPL-MCNC: 15 MG/DL — SIGNIFICANT CHANGE UP (ref 7–23)
CALCIUM SERPL-MCNC: 9.6 MG/DL — SIGNIFICANT CHANGE UP (ref 8.4–10.5)
CAST: SIGNIFICANT CHANGE UP /LPF
CD3 BLASTS SPEC-ACNC: 2074 CELLS/UL — HIGH (ref 672–1870)
CD3 BLASTS SPEC-ACNC: 86 % — HIGH (ref 59–83)
CD4 %: 25 % — LOW (ref 30–62)
CD8 %: 61 % — HIGH (ref 12–36)
CHLORIDE SERPL-SCNC: 101 MMOL/L — SIGNIFICANT CHANGE UP (ref 96–108)
CO2 SERPL-SCNC: 28 MMOL/L — SIGNIFICANT CHANGE UP (ref 22–31)
COLOR SPEC: YELLOW — SIGNIFICANT CHANGE UP
CREAT SERPL-MCNC: 1.13 MG/DL — SIGNIFICANT CHANGE UP (ref 0.5–1.3)
DIFF PNL FLD: ABNORMAL
EGFR: 76 ML/MIN/1.73M2 — SIGNIFICANT CHANGE UP
GLUCOSE SERPL-MCNC: 92 MG/DL — SIGNIFICANT CHANGE UP (ref 70–99)
GLUCOSE UR QL: NEGATIVE MG/DL — SIGNIFICANT CHANGE UP
HCT VFR BLD CALC: 37 % — LOW (ref 39–50)
HGB BLD-MCNC: 11.8 G/DL — LOW (ref 13–17)
KETONES UR-MCNC: NEGATIVE MG/DL — SIGNIFICANT CHANGE UP
LEUKOCYTE ESTERASE UR-ACNC: ABNORMAL
MCHC RBC-ENTMCNC: 28.3 PG — SIGNIFICANT CHANGE UP (ref 27–34)
MCHC RBC-ENTMCNC: 31.9 GM/DL — LOW (ref 32–36)
MCV RBC AUTO: 88.7 FL — SIGNIFICANT CHANGE UP (ref 80–100)
MUCOUS THREADS # UR AUTO: PRESENT
NITRITE UR-MCNC: POSITIVE
PH UR: 6.5 — SIGNIFICANT CHANGE UP (ref 5–8)
PLATELET # BLD AUTO: 400 K/UL — SIGNIFICANT CHANGE UP (ref 150–400)
POTASSIUM SERPL-MCNC: 5.2 MMOL/L — SIGNIFICANT CHANGE UP (ref 3.5–5.3)
POTASSIUM SERPL-SCNC: 5.2 MMOL/L — SIGNIFICANT CHANGE UP (ref 3.5–5.3)
PROT SERPL-MCNC: 7.5 G/DL — SIGNIFICANT CHANGE UP (ref 6–8.3)
PROT UR-MCNC: 300 MG/DL
RBC # BLD: 4.17 M/UL — LOW (ref 4.2–5.8)
RBC # FLD: 14.6 % — HIGH (ref 10.3–14.5)
RBC CASTS # UR COMP ASSIST: 89 /HPF — HIGH (ref 0–4)
REVIEW: SIGNIFICANT CHANGE UP
SODIUM SERPL-SCNC: 138 MMOL/L — SIGNIFICANT CHANGE UP (ref 135–145)
SP GR SPEC: 1.02 — SIGNIFICANT CHANGE UP (ref 1–1.03)
SQUAMOUS # UR AUTO: 3 /HPF — SIGNIFICANT CHANGE UP (ref 0–5)
T-CELL CD4 SUBSET PNL BLD: 608 CELLS/UL — SIGNIFICANT CHANGE UP (ref 489–1457)
UROBILINOGEN FLD QL: 0.2 MG/DL — SIGNIFICANT CHANGE UP (ref 0.2–1)
WBC # BLD: 7.21 K/UL — SIGNIFICANT CHANGE UP (ref 3.8–10.5)
WBC # FLD AUTO: 7.21 K/UL — SIGNIFICANT CHANGE UP (ref 3.8–10.5)
WBC CLUMPS # UR AUTO: PRESENT
WBC UR QL: >998 /HPF — HIGH (ref 0–5)

## 2024-02-12 PROCEDURE — 80053 COMPREHEN METABOLIC PANEL: CPT

## 2024-02-12 PROCEDURE — 81001 URINALYSIS AUTO W/SCOPE: CPT

## 2024-02-12 PROCEDURE — 87077 CULTURE AEROBIC IDENTIFY: CPT

## 2024-02-12 PROCEDURE — 86360 T CELL ABSOLUTE COUNT/RATIO: CPT

## 2024-02-12 PROCEDURE — 86780 TREPONEMA PALLIDUM: CPT

## 2024-02-12 PROCEDURE — G0463: CPT | Mod: 25

## 2024-02-12 PROCEDURE — 87086 URINE CULTURE/COLONY COUNT: CPT

## 2024-02-12 PROCEDURE — 86592 SYPHILIS TEST NON-TREP QUAL: CPT

## 2024-02-12 PROCEDURE — 86593 SYPHILIS TEST NON-TREP QUANT: CPT

## 2024-02-12 PROCEDURE — 87186 SC STD MICRODIL/AGAR DIL: CPT

## 2024-02-12 PROCEDURE — 85027 COMPLETE CBC AUTOMATED: CPT

## 2024-02-12 PROCEDURE — 99214 OFFICE O/P EST MOD 30 MIN: CPT | Mod: 25

## 2024-02-12 PROCEDURE — 87536 HIV-1 QUANT&REVRSE TRNSCRPJ: CPT

## 2024-02-12 PROCEDURE — 86359 T CELLS TOTAL COUNT: CPT

## 2024-02-12 PROCEDURE — 96372 THER/PROPH/DIAG INJ SC/IM: CPT

## 2024-02-12 RX ORDER — CIPROFLOXACIN HYDROCHLORIDE 500 MG/1
500 TABLET, FILM COATED ORAL
Qty: 10 | Refills: 0 | Status: ACTIVE | COMMUNITY
Start: 2024-02-12 | End: 1900-01-01

## 2024-02-12 RX ORDER — PENICILLIN G BENZATHINE 2400000 [IU]/4ML
2400000 INJECTION, SUSPENSION INTRAMUSCULAR
Qty: 0 | Refills: 0 | Status: COMPLETED | OUTPATIENT
Start: 2024-02-12

## 2024-02-12 RX ADMIN — PENICILLIN G BENZATHINE 0 UNIT/4ML: 1200000 INJECTION, SUSPENSION INTRAMUSCULAR at 00:00

## 2024-02-12 NOTE — REVIEW OF SYSTEMS
[Fever] : no fever [Chills] : no chills [Eye Pain] : no eye pain [Red Eyes] : eyes not red [Earache] : no earache [Loss Of Hearing] : no hearing loss [Chest Pain] : no chest pain [Palpitations] : no palpitations [Shortness Of Breath] : no shortness of breath [Wheezing] : no wheezing [Vomiting] : no vomiting [Dysuria] : dysuria [Joint Swelling] : no joint swelling [Suicidal] : not suicidal [Easy Bleeding] : no tendency for easy bleeding [Easy Bruising] : no tendency for easy bruising [Negative] : Heme/Lymph [FreeTextEntry8] : penile and scrotal pain/numbness  [FreeTextEntry9] : back pain [de-identified] : rash on feet and also macular on torso [de-identified] : forgetful and sleepy

## 2024-02-12 NOTE — PHYSICAL EXAM
[General Appearance - In No Acute Distress] : in no acute distress [Sclera] : the sclera and conjunctiva were normal [Outer Ear] : the ears and nose were normal in appearance [Neck Appearance] : the appearance of the neck was normal [] : no respiratory distress [Auscultation Breath Sounds / Voice Sounds] : lungs were clear to auscultation bilaterally [Heart Sounds] : normal S1 and S2 [Edema] : there was no peripheral edema [Bowel Sounds] : normal bowel sounds [Abdomen Soft] : soft [Costovertebral Angle Tenderness] : no CVA tenderness [No Palpable Adenopathy] : no palpable adenopathy [Musculoskeletal - Swelling] : no joint swelling [No Focal Deficits] : no focal deficits [FreeTextEntry1] : pt appears slow and distant [Affect] : the affect was normal

## 2024-02-12 NOTE — ASSESSMENT
[FreeTextEntry1] : 58 m with HTN, HIV diagnosed 1992 on Biktarvy, prostate ca 2016 s/p radiation, anal cancer (dx 4/2021) s/p radiation and chemotherapy (last 8/2021) complicated by an ulcer, abscess and rectal bleeding s/p loop colostomy 3/30/22 and then s/p APR with end colostomy creation, VRAM flap closure, 8/24 found to have a large abscess as well, abscess cx with clostridium, bacteroides again was admitted for GIB and pelvic/groin abscess s/p drain placement and CT also showed L ischial tuberosity osteo so pt received zosyn in the hospital and was discharged with PO levo and flagyl to finish a 6 week course he was admitted 11/2022 with hematuria and hydro, urine cx and AFB were negative, s/p b/l nephrostomy, and then had laser lithotripsy and stent placement 1/25/23, the nephrostomies were clamped and removed now with b/l stents, he still had severe pain while sitting down at the flap site, dysuria and penile/scrotal numbness, pt had multiple ER visits for pain but urine cx negative, he c/o penile numbness 8/2023 so LS spine and pelvic MRI was done 9/22 which showed 1.5 cm fat rim-enhancing collection at the left posterolateral aspect of the perineal flap most likely representing a small abscess, then pt developed fever, abd pain, back pain, dysuria and was admitted with sepsis, proteus UTI CT: redemonstrated moderate hydronephrosis with bilateral urothelial thickening and adjacent inflammatory stranding, which may be due to reactive inflammation or ascending UTI s/p 2 weeks of zosym => augmentin now has a rash that was being followed by derm s/p biopsy and showed syphilis, pt was treated for syphilis multiple times before and this does not appear to be a new infection either (as pt has not been sexually active, in and out of hospital for surgeries) was also seen by urology the urine cx last week showed a pseudomonas which was I to cipro and levo  but pt was given augmentin with no improvment    HIV on Biktarvy, VL:UD, CD4 594 c/w biktarvy and repeat VL, CD4   rash s/p biopsy by derm which showed syphilis 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  anal Ca s/p chemo and RT with rectal ulcer, abscess and bleed s/p loop colostomy 3/30/22 and then s/p APR with end colostomy creation, VRAM flap closure, 8/24 found to have a large abscess as well, abscess cx with clostridium, bacteroides again was admitted for GIB and pelvic/groin abscess s/p drain placement and CT also showed L ischial tuberosity osteo so pt received zosyn in the hospital and was discharged with PO levo and flagyl to finish a 6 week course pelvic MRI 9/2023 showed a small abscess in the flap, again s/p a 2 week course of zosyn, augmentin  penile and scrotal numbness with incontinency pt stated this started after surgery so likely a surgical complication, LS spine MRI and pelvic MRI 9/2023 did not show anything to explain this problem, was also seen by urology and was sent to neuro  hematuria, hydro, nephrolithiasis admitted 11/2022 with hematuria and hydro, urine cx and AFB were negative, s/p b/l nephrostomy, and then had laser lithotripsy and stent placement 1/25/23, the nephrostomies were clamped and removed now with b/l stents multiple UTIs after, last urine cx negative and going for cysoscopy and stent exchange in 2 days but has some dysuria, and last urine showed pseudomonas I to cipro/levo and urology gave him augmentin I explained that the picuture is s/o cystitis no pyelo and intermdate cipro should still work for cystitis but will reevaluate after the course and if not symptomatically better and the urine cx still positive will do IV  HTN, follows with medicine and medications were just adjusted but still has elevated BP, will follow with medicine  HCM: s/p flu and COVID vaccine s/p prevnar 2022, pneumovax 2023 f/u with dental and ophthalmology.

## 2024-02-13 ENCOUNTER — APPOINTMENT (OUTPATIENT)
Dept: DERMATOLOGY | Facility: CLINIC | Age: 59
End: 2024-02-13
Payer: MEDICAID

## 2024-02-13 LAB
HIV-1 VIRAL LOAD RESULT: SIGNIFICANT CHANGE UP
HIV1 RNA # SERPL NAA+PROBE: SIGNIFICANT CHANGE UP COPIES/ML
HIV1 RNA SER-IMP: SIGNIFICANT CHANGE UP
HIV1 RNA SERPL NAA+PROBE-ACNC: SIGNIFICANT CHANGE UP
HIV1 RNA SERPL NAA+PROBE-LOG#: SIGNIFICANT CHANGE UP LG COP/ML
RPR SER-TITR: (no result)
RPR SERPL-ACNC: REACTIVE
T PALLIDUM AB TITR SER: POSITIVE

## 2024-02-14 ENCOUNTER — APPOINTMENT (OUTPATIENT)
Dept: INTERNAL MEDICINE | Facility: CLINIC | Age: 59
End: 2024-02-14

## 2024-02-15 ENCOUNTER — APPOINTMENT (OUTPATIENT)
Dept: DERMATOLOGY | Facility: CLINIC | Age: 59
End: 2024-02-15
Payer: MEDICAID

## 2024-02-15 ENCOUNTER — APPOINTMENT (OUTPATIENT)
Dept: NEUROLOGY | Facility: CLINIC | Age: 59
End: 2024-02-15

## 2024-02-15 DIAGNOSIS — D18.01 HEMANGIOMA OF SKIN AND SUBCUTANEOUS TISSUE: ICD-10-CM

## 2024-02-15 DIAGNOSIS — D22.9 MELANOCYTIC NEVI, UNSPECIFIED: ICD-10-CM

## 2024-02-15 DIAGNOSIS — Z12.83 ENCOUNTER FOR SCREENING FOR MALIGNANT NEOPLASM OF SKIN: ICD-10-CM

## 2024-02-15 LAB
-  AMIKACIN: SIGNIFICANT CHANGE UP
-  AZTREONAM: SIGNIFICANT CHANGE UP
-  CEFEPIME: SIGNIFICANT CHANGE UP
-  CEFTAZIDIME: SIGNIFICANT CHANGE UP
-  CIPROFLOXACIN: SIGNIFICANT CHANGE UP
-  IMIPENEM: SIGNIFICANT CHANGE UP
-  LEVOFLOXACIN: SIGNIFICANT CHANGE UP
-  MEROPENEM: SIGNIFICANT CHANGE UP
-  PIPERACILLIN/TAZOBACTAM: SIGNIFICANT CHANGE UP
CULTURE RESULTS: ABNORMAL
METHOD TYPE: SIGNIFICANT CHANGE UP
ORGANISM # SPEC MICROSCOPIC CNT: ABNORMAL
ORGANISM # SPEC MICROSCOPIC CNT: ABNORMAL
SPECIMEN SOURCE: SIGNIFICANT CHANGE UP

## 2024-02-15 PROCEDURE — 99213 OFFICE O/P EST LOW 20 MIN: CPT

## 2024-02-16 ENCOUNTER — APPOINTMENT (OUTPATIENT)
Dept: PODIATRY | Facility: CLINIC | Age: 59
End: 2024-02-16
Payer: MEDICAID

## 2024-02-16 VITALS
HEIGHT: 71 IN | TEMPERATURE: 97.3 F | BODY MASS INDEX: 28.7 KG/M2 | DIASTOLIC BLOOD PRESSURE: 95 MMHG | WEIGHT: 205 LBS | HEART RATE: 79 BPM | OXYGEN SATURATION: 96 % | SYSTOLIC BLOOD PRESSURE: 131 MMHG

## 2024-02-16 DIAGNOSIS — B35.1 TINEA UNGUIUM: ICD-10-CM

## 2024-02-16 PROBLEM — D22.9 MULTIPLE BENIGN NEVI: Status: ACTIVE | Noted: 2024-02-16

## 2024-02-16 PROBLEM — Z12.83 SCREENING EXAM FOR SKIN CANCER: Status: ACTIVE | Noted: 2024-02-16

## 2024-02-16 PROBLEM — D18.01 CHERRY ANGIOMA: Status: ACTIVE | Noted: 2024-02-16

## 2024-02-16 PROCEDURE — 99203 OFFICE O/P NEW LOW 30 MIN: CPT

## 2024-02-16 RX ORDER — CICLOPIROX 80 MG/ML
8 SOLUTION TOPICAL
Qty: 1 | Refills: 2 | Status: ACTIVE | COMMUNITY
Start: 2024-02-16 | End: 1900-01-01

## 2024-02-16 NOTE — CONSULT LETTER
[Dear  ___] : Dear  [unfilled], [Consult Letter:] : I had the pleasure of evaluating your patient, [unfilled]. [Please see my note below.] : Please see my note below. [Consult Closing:] : Thank you very much for allowing me to participate in the care of this patient.  If you have any questions, please do not hesitate to contact me. [Sincerely,] : Sincerely, [FreeTextEntry3] : Leana Theodore MD Department of Dermatology [place] and [facility] at [facility]

## 2024-02-16 NOTE — HISTORY OF PRESENT ILLNESS
[FreeTextEntry1] : fp rash [de-identified] : Referred by: Dr. Schwartz   Mr. CARL ROWLEY  is a 58 year old M w/ h/o anal ca s/p RT 2021, prostate cancer s/p RT in 2016 (on Lupron), HIV (VL UD,CD4 608 2/2024) on Biktarvy. here for evaluation of below. of note since , pt was recently hospitalized jan 2024 w/ b/l nephritis and tx w/ IV abx.  #asx rash on feet - deny improvement with ketoconazole cream - at  noted rash on rest of body , present for a few yrs, asx. thought he got a new one on R arm - biopsied on body at  susp for syphilis , serum testing post. I emailed Dr. Gordon who was able to see him a few days ago and administer penicillin tx.  and patient endorses a known h/o syphilis in past for which he has received tx in past. he remembers testing pos in 2021 and receiving tx at that time. was unaware that he also tested pos in 2023 and does not recall receiving any tx for syphilis at that time. denies any H/A, dizziness, changes to his vision hx: - present about 1 yr - untreated   no personal or family h/o skin cancer

## 2024-02-16 NOTE — PHYSICAL EXAM
[Alert] : alert [Oriented x 3] : ~L oriented x 3 [Well Nourished] : well nourished [Conjunctiva Non-injected] : conjunctiva non-injected [No Visual Lymphadenopathy] : no visual  lymphadenopathy [No Clubbing] : no clubbing [No Edema] : no edema [No Bromhidrosis] : no bromhidrosis [No Chromhidrosis] : no chromhidrosis [Full Body Skin Exam Performed] : performed [FreeTextEntry3] : few scattered smooth erythematous thin plaques on trunk, R arm multiple smooth pink annular and round thin patches on b/l dorsal feet - ALL IMPROVING since LV.  few brown macules scattered on trunk, face, extremities and red papules on trunk

## 2024-02-16 NOTE — ASSESSMENT
[External notes review: [ enter provider(s) name(s) ] :____] : As part of my evaluation, I have reviewed prior clinical note(s) from provider(s) outside of my group practice. The name(s) are: [unfilled] [FreeTextEntry1] : #secondary syphilis, b/l feet and trunk, in pt with HIV and anal and prostate carcinoma biopsied on trunk at   1/10/24 c/w syphilis history of syphilis in the past. he reports last tx in 2021 last syphilis testing per chart review was pos in 2/2023 no signs/symptoms c/f tertiary syphilis at this time serologic testing on 2/5/24 pos RPR and trepab received IM penicillin 2/12 w/ Dr. Gordon recommend f/u and repeat testing with Dr. Gordon to ensure no treatment failure/recalcitrant disease which can be seen in HIV/otherwise immunocompromised individuals recommend serologic testing of his wife  #nevi #cherry angioma - Counseled about clinically and dermatoscopically benign lesions - No treatment indicated at this time - Counseled patient to notify us of any changes  - TBSE performed today - Advised sun protection, broad spectrum SPF 30 or higher daily and reapply often, sun protective clothing - Counseled patient to monitor for changes - rtc q1yr for repeat skin exam or sooner if new/concerning lesion  NOT DISCUSSED #onychodystrophy favor onychomycosis discussed nature and course of condition, including treatment options risks/benefits/alternatives involved (PO terbinafine vs topical antifungals which have low efficacy) removal/treatment deferred at this time reassurance provided

## 2024-02-22 ENCOUNTER — INPATIENT (INPATIENT)
Facility: HOSPITAL | Age: 59
LOS: 6 days | Discharge: HOME | End: 2024-02-29
Attending: HOSPITALIST | Admitting: HOSPITALIST
Payer: MEDICAID

## 2024-02-22 VITALS
DIASTOLIC BLOOD PRESSURE: 78 MMHG | RESPIRATION RATE: 20 BRPM | WEIGHT: 214.95 LBS | HEIGHT: 71 IN | OXYGEN SATURATION: 96 % | HEART RATE: 98 BPM | TEMPERATURE: 99 F | SYSTOLIC BLOOD PRESSURE: 108 MMHG

## 2024-02-22 DIAGNOSIS — N39.0 URINARY TRACT INFECTION, SITE NOT SPECIFIED: ICD-10-CM

## 2024-02-22 DIAGNOSIS — Z98.890 OTHER SPECIFIED POSTPROCEDURAL STATES: Chronic | ICD-10-CM

## 2024-02-22 DIAGNOSIS — K59.03 DRUG INDUCED CONSTIPATION: ICD-10-CM

## 2024-02-22 DIAGNOSIS — I10 ESSENTIAL (PRIMARY) HYPERTENSION: ICD-10-CM

## 2024-02-22 DIAGNOSIS — K62.9 DISEASE OF ANUS AND RECTUM, UNSPECIFIED: Chronic | ICD-10-CM

## 2024-02-22 DIAGNOSIS — Z93.3 COLOSTOMY STATUS: Chronic | ICD-10-CM

## 2024-02-22 DIAGNOSIS — B20 HUMAN IMMUNODEFICIENCY VIRUS [HIV] DISEASE: ICD-10-CM

## 2024-02-22 DIAGNOSIS — Z96.0 PRESENCE OF UROGENITAL IMPLANTS: Chronic | ICD-10-CM

## 2024-02-22 DIAGNOSIS — E78.5 HYPERLIPIDEMIA, UNSPECIFIED: ICD-10-CM

## 2024-02-22 LAB
ALBUMIN SERPL ELPH-MCNC: 3.2 G/DL — LOW (ref 3.3–5)
ALP SERPL-CCNC: 96 U/L — SIGNIFICANT CHANGE UP (ref 40–120)
ALT FLD-CCNC: 13 U/L — SIGNIFICANT CHANGE UP (ref 12–78)
ANION GAP SERPL CALC-SCNC: 4 MMOL/L — LOW (ref 5–17)
APPEARANCE UR: ABNORMAL
APTT BLD: 33.8 SEC — SIGNIFICANT CHANGE UP (ref 24.5–35.6)
AST SERPL-CCNC: 17 U/L — SIGNIFICANT CHANGE UP (ref 15–37)
BACTERIA # UR AUTO: ABNORMAL /HPF
BASOPHILS # BLD AUTO: 0.03 K/UL — SIGNIFICANT CHANGE UP (ref 0–0.2)
BASOPHILS NFR BLD AUTO: 0.3 % — SIGNIFICANT CHANGE UP (ref 0–2)
BILIRUB SERPL-MCNC: 0.3 MG/DL — SIGNIFICANT CHANGE UP (ref 0.2–1.2)
BILIRUB UR-MCNC: NEGATIVE — SIGNIFICANT CHANGE UP
BUN SERPL-MCNC: 13 MG/DL — SIGNIFICANT CHANGE UP (ref 7–23)
CALCIUM SERPL-MCNC: 9.1 MG/DL — SIGNIFICANT CHANGE UP (ref 8.5–10.1)
CHLORIDE SERPL-SCNC: 112 MMOL/L — HIGH (ref 96–108)
CO2 SERPL-SCNC: 27 MMOL/L — SIGNIFICANT CHANGE UP (ref 22–31)
COLOR SPEC: YELLOW — SIGNIFICANT CHANGE UP
CREAT SERPL-MCNC: 1.15 MG/DL — SIGNIFICANT CHANGE UP (ref 0.5–1.3)
DIFF PNL FLD: ABNORMAL
EGFR: 74 ML/MIN/1.73M2 — SIGNIFICANT CHANGE UP
EOSINOPHIL # BLD AUTO: 0.03 K/UL — SIGNIFICANT CHANGE UP (ref 0–0.5)
EOSINOPHIL NFR BLD AUTO: 0.3 % — SIGNIFICANT CHANGE UP (ref 0–6)
GLUCOSE SERPL-MCNC: 117 MG/DL — HIGH (ref 70–99)
GLUCOSE UR QL: NEGATIVE MG/DL — SIGNIFICANT CHANGE UP
HCT VFR BLD CALC: 37.7 % — LOW (ref 39–50)
HGB BLD-MCNC: 12.1 G/DL — LOW (ref 13–17)
IMM GRANULOCYTES NFR BLD AUTO: 0.2 % — SIGNIFICANT CHANGE UP (ref 0–0.9)
INR BLD: 1.03 RATIO — SIGNIFICANT CHANGE UP (ref 0.85–1.18)
KETONES UR-MCNC: NEGATIVE MG/DL — SIGNIFICANT CHANGE UP
LACTATE SERPL-SCNC: 1.7 MMOL/L — SIGNIFICANT CHANGE UP (ref 0.7–2)
LACTATE SERPL-SCNC: 2.3 MMOL/L — HIGH (ref 0.7–2)
LEUKOCYTE ESTERASE UR-ACNC: ABNORMAL
LYMPHOCYTES # BLD AUTO: 1.37 K/UL — SIGNIFICANT CHANGE UP (ref 1–3.3)
LYMPHOCYTES # BLD AUTO: 15.1 % — SIGNIFICANT CHANGE UP (ref 13–44)
MCHC RBC-ENTMCNC: 28.3 PG — SIGNIFICANT CHANGE UP (ref 27–34)
MCHC RBC-ENTMCNC: 32.1 G/DL — SIGNIFICANT CHANGE UP (ref 32–36)
MCV RBC AUTO: 88.1 FL — SIGNIFICANT CHANGE UP (ref 80–100)
MONOCYTES # BLD AUTO: 0.82 K/UL — SIGNIFICANT CHANGE UP (ref 0–0.9)
MONOCYTES NFR BLD AUTO: 9 % — SIGNIFICANT CHANGE UP (ref 2–14)
NEUTROPHILS # BLD AUTO: 6.8 K/UL — SIGNIFICANT CHANGE UP (ref 1.8–7.4)
NEUTROPHILS NFR BLD AUTO: 75.1 % — SIGNIFICANT CHANGE UP (ref 43–77)
NITRITE UR-MCNC: POSITIVE
NRBC # BLD: 0 /100 WBCS — SIGNIFICANT CHANGE UP (ref 0–0)
PH UR: 6.5 — SIGNIFICANT CHANGE UP (ref 5–8)
PLATELET # BLD AUTO: 355 K/UL — SIGNIFICANT CHANGE UP (ref 150–400)
POTASSIUM SERPL-MCNC: 4.1 MMOL/L — SIGNIFICANT CHANGE UP (ref 3.5–5.3)
POTASSIUM SERPL-SCNC: 4.1 MMOL/L — SIGNIFICANT CHANGE UP (ref 3.5–5.3)
PROT SERPL-MCNC: 7.4 GM/DL — SIGNIFICANT CHANGE UP (ref 6–8.3)
PROT UR-MCNC: 300 MG/DL
PROTHROM AB SERPL-ACNC: 12.2 SEC — SIGNIFICANT CHANGE UP (ref 9.5–13)
RBC # BLD: 4.28 M/UL — SIGNIFICANT CHANGE UP (ref 4.2–5.8)
RBC # FLD: 14.2 % — SIGNIFICANT CHANGE UP (ref 10.3–14.5)
RBC CASTS # UR COMP ASSIST: ABNORMAL /HPF
SODIUM SERPL-SCNC: 143 MMOL/L — SIGNIFICANT CHANGE UP (ref 135–145)
SP GR SPEC: 1.02 — SIGNIFICANT CHANGE UP (ref 1–1.03)
UROBILINOGEN FLD QL: 0.2 MG/DL — SIGNIFICANT CHANGE UP (ref 0.2–1)
WBC # BLD: 9.07 K/UL — SIGNIFICANT CHANGE UP (ref 3.8–10.5)
WBC # FLD AUTO: 9.07 K/UL — SIGNIFICANT CHANGE UP (ref 3.8–10.5)
WBC UR QL: ABNORMAL /HPF (ref 0–5)

## 2024-02-22 PROCEDURE — 99223 1ST HOSP IP/OBS HIGH 75: CPT

## 2024-02-22 PROCEDURE — 99222 1ST HOSP IP/OBS MODERATE 55: CPT

## 2024-02-22 PROCEDURE — 71046 X-RAY EXAM CHEST 2 VIEWS: CPT | Mod: 26

## 2024-02-22 PROCEDURE — 50435 EXCHANGE NEPHROSTOMY CATH: CPT | Mod: 50

## 2024-02-22 PROCEDURE — 99285 EMERGENCY DEPT VISIT HI MDM: CPT

## 2024-02-22 PROCEDURE — 93010 ELECTROCARDIOGRAM REPORT: CPT

## 2024-02-22 PROCEDURE — 74176 CT ABD & PELVIS W/O CONTRAST: CPT | Mod: 26,MA

## 2024-02-22 RX ORDER — ACETAMINOPHEN 500 MG
650 TABLET ORAL EVERY 6 HOURS
Refills: 0 | Status: DISCONTINUED | OUTPATIENT
Start: 2024-02-22 | End: 2024-02-28

## 2024-02-22 RX ORDER — PIPERACILLIN AND TAZOBACTAM 4; .5 G/20ML; G/20ML
3.38 INJECTION, POWDER, LYOPHILIZED, FOR SOLUTION INTRAVENOUS ONCE
Refills: 0 | Status: DISCONTINUED | OUTPATIENT
Start: 2024-02-22 | End: 2024-02-22

## 2024-02-22 RX ORDER — DILTIAZEM HCL 120 MG
240 CAPSULE, EXT RELEASE 24 HR ORAL DAILY
Refills: 0 | Status: DISCONTINUED | OUTPATIENT
Start: 2024-02-22 | End: 2024-02-28

## 2024-02-22 RX ORDER — SENNA PLUS 8.6 MG/1
2 TABLET ORAL AT BEDTIME
Refills: 0 | Status: DISCONTINUED | OUTPATIENT
Start: 2024-02-22 | End: 2024-02-28

## 2024-02-22 RX ORDER — PIPERACILLIN AND TAZOBACTAM 4; .5 G/20ML; G/20ML
3.38 INJECTION, POWDER, LYOPHILIZED, FOR SOLUTION INTRAVENOUS EVERY 8 HOURS
Refills: 0 | Status: DISCONTINUED | OUTPATIENT
Start: 2024-02-22 | End: 2024-02-28

## 2024-02-22 RX ORDER — ONDANSETRON 8 MG/1
4 TABLET, FILM COATED ORAL EVERY 8 HOURS
Refills: 0 | Status: DISCONTINUED | OUTPATIENT
Start: 2024-02-22 | End: 2024-02-28

## 2024-02-22 RX ORDER — ENOXAPARIN SODIUM 100 MG/ML
40 INJECTION SUBCUTANEOUS EVERY 24 HOURS
Refills: 0 | Status: DISCONTINUED | OUTPATIENT
Start: 2024-02-22 | End: 2024-02-25

## 2024-02-22 RX ORDER — METOPROLOL TARTRATE 50 MG
100 TABLET ORAL DAILY
Refills: 0 | Status: DISCONTINUED | OUTPATIENT
Start: 2024-02-22 | End: 2024-02-28

## 2024-02-22 RX ORDER — SODIUM CHLORIDE 9 MG/ML
2300 INJECTION INTRAMUSCULAR; INTRAVENOUS; SUBCUTANEOUS ONCE
Refills: 0 | Status: COMPLETED | OUTPATIENT
Start: 2024-02-22 | End: 2024-02-22

## 2024-02-22 RX ORDER — ALPRAZOLAM 0.25 MG
1 TABLET ORAL EVERY 6 HOURS
Refills: 0 | Status: DISCONTINUED | OUTPATIENT
Start: 2024-02-22 | End: 2024-02-28

## 2024-02-22 RX ORDER — HYDROMORPHONE HYDROCHLORIDE 2 MG/ML
1 INJECTION INTRAMUSCULAR; INTRAVENOUS; SUBCUTANEOUS EVERY 4 HOURS
Refills: 0 | Status: DISCONTINUED | OUTPATIENT
Start: 2024-02-22 | End: 2024-02-28

## 2024-02-22 RX ORDER — OXYCODONE HYDROCHLORIDE 5 MG/1
10 TABLET ORAL EVERY 6 HOURS
Refills: 0 | Status: DISCONTINUED | OUTPATIENT
Start: 2024-02-22 | End: 2024-02-28

## 2024-02-22 RX ORDER — PIPERACILLIN AND TAZOBACTAM 4; .5 G/20ML; G/20ML
3.38 INJECTION, POWDER, LYOPHILIZED, FOR SOLUTION INTRAVENOUS ONCE
Refills: 0 | Status: COMPLETED | OUTPATIENT
Start: 2024-02-22 | End: 2024-02-22

## 2024-02-22 RX ORDER — HYDROMORPHONE HYDROCHLORIDE 2 MG/ML
1 INJECTION INTRAMUSCULAR; INTRAVENOUS; SUBCUTANEOUS ONCE
Refills: 0 | Status: DISCONTINUED | OUTPATIENT
Start: 2024-02-22 | End: 2024-02-22

## 2024-02-22 RX ORDER — LACTULOSE 10 G/15ML
10 SOLUTION ORAL DAILY
Refills: 0 | Status: DISCONTINUED | OUTPATIENT
Start: 2024-02-22 | End: 2024-02-28

## 2024-02-22 RX ORDER — ZOLPIDEM TARTRATE 10 MG/1
5 TABLET ORAL AT BEDTIME
Refills: 0 | Status: DISCONTINUED | OUTPATIENT
Start: 2024-02-22 | End: 2024-02-28

## 2024-02-22 RX ORDER — ACETAMINOPHEN 500 MG
1000 TABLET ORAL ONCE
Refills: 0 | Status: COMPLETED | OUTPATIENT
Start: 2024-02-22 | End: 2024-02-22

## 2024-02-22 RX ORDER — BICTEGRAVIR SODIUM, EMTRICITABINE, AND TENOFOVIR ALAFENAMIDE FUMARATE 30; 120; 15 MG/1; MG/1; MG/1
1 TABLET ORAL DAILY
Refills: 0 | Status: DISCONTINUED | OUTPATIENT
Start: 2024-02-23 | End: 2024-02-28

## 2024-02-22 RX ADMIN — ZOLPIDEM TARTRATE 5 MILLIGRAM(S): 10 TABLET ORAL at 21:09

## 2024-02-22 RX ADMIN — HYDROMORPHONE HYDROCHLORIDE 1 MILLIGRAM(S): 2 INJECTION INTRAMUSCULAR; INTRAVENOUS; SUBCUTANEOUS at 11:17

## 2024-02-22 RX ADMIN — SODIUM CHLORIDE 2300 MILLILITER(S): 9 INJECTION INTRAMUSCULAR; INTRAVENOUS; SUBCUTANEOUS at 11:17

## 2024-02-22 RX ADMIN — SENNA PLUS 2 TABLET(S): 8.6 TABLET ORAL at 21:23

## 2024-02-22 RX ADMIN — Medication 1000 MILLIGRAM(S): at 13:35

## 2024-02-22 RX ADMIN — SODIUM CHLORIDE 2300 MILLILITER(S): 9 INJECTION INTRAMUSCULAR; INTRAVENOUS; SUBCUTANEOUS at 09:27

## 2024-02-22 RX ADMIN — PIPERACILLIN AND TAZOBACTAM 25 GRAM(S): 4; .5 INJECTION, POWDER, LYOPHILIZED, FOR SOLUTION INTRAVENOUS at 15:15

## 2024-02-22 RX ADMIN — Medication 400 MILLIGRAM(S): at 12:11

## 2024-02-22 RX ADMIN — PIPERACILLIN AND TAZOBACTAM 200 GRAM(S): 4; .5 INJECTION, POWDER, LYOPHILIZED, FOR SOLUTION INTRAVENOUS at 11:04

## 2024-02-22 RX ADMIN — ENOXAPARIN SODIUM 40 MILLIGRAM(S): 100 INJECTION SUBCUTANEOUS at 21:09

## 2024-02-22 RX ADMIN — HYDROMORPHONE HYDROCHLORIDE 1 MILLIGRAM(S): 2 INJECTION INTRAMUSCULAR; INTRAVENOUS; SUBCUTANEOUS at 09:30

## 2024-02-22 RX ADMIN — HYDROMORPHONE HYDROCHLORIDE 1 MILLIGRAM(S): 2 INJECTION INTRAMUSCULAR; INTRAVENOUS; SUBCUTANEOUS at 18:14

## 2024-02-22 NOTE — H&P ADULT - NSHPADDITIONALINFOADULT_GEN_ALL_CORE
Recent PRP positive but [facility] only [facility]:1 since back in 2022. Patient reported h/o treated syphilis. Also received IM Bicillin L-A in ID clinic on 2/12/2014 ( [facility] ID visit note)

## 2024-02-22 NOTE — ED ADULT NURSE NOTE - NSFALLUNIVINTERV_ED_ALL_ED
Bed/Stretcher in lowest position, wheels locked, appropriate side rails in place/Call bell, personal items and telephone in reach/Instruct patient to call for assistance before getting out of bed/chair/stretcher/Non-slip footwear applied when patient is off stretcher/[place] to call system/Physically safe environment - no spills, clutter or unnecessary equipment/Purposeful proactive rounding/Room/bathroom lighting operational, light cord in reach

## 2024-02-22 NOTE — PHARMACOTHERAPY INTERVENTION NOTE - COMMENTS
Per pharmacy policy for dose optimization on antimicrobials, adjusted administration time of piperacillin/tazobactam from 4 hours to 30 minutes for initial loading dose.

## 2024-02-22 NOTE — ED PROVIDER NOTE - OBJECTIVE STATEMENT
58 year old with dysuria and back pain     pt was discharged about a month ago for similar presentaion.  pt has stents in ureturs to address radiation induced stricures.  pt had growth and trated and again regrew organisms.      pt most recently failed outpatient treatment after 10 days of cipro

## 2024-02-22 NOTE — H&P ADULT - PROBLEM SELECTOR PLAN 1
h/o recurrent UTI, come in with dysuria  slightly elevated lactate, s/p IV fluid, repeat lactate--> ordered  CT abd/pelvis  ( I personally review) with bilateral ureteral stents. Stable bilateral moderate to severe hydronephrosis without obstructing focus. Bilateral periureteral and perivesicular stranding may be reactive and/or reflect UTI. Lingular consolidation  Recent urine culture with pseudomonas  Continue with zosyn  Follow up blood and urine culture   Pain control--> IV dilaudid prn for severe pain  ? will benefit from stent exchange,?  recurrent UTI due to seeding in stent  Urology consulted  ID consulted  CT with ? lingular consolidation. No respiratory symptoms. Check CXR PA/LAT. On Zosyn, will cover pneumonia if there is any

## 2024-02-22 NOTE — H&P ADULT - NSHPPHYSICALEXAM_GEN_ALL_CORE
CONSTITUTIONAL: alert and cooperative, no acute distress  EYES: PERRL, no scleral icterus  ENT: Mucosa moist, tongue normal  NECK: Neck supple, trachea midline, non-tender  CARDIAC: Normal S1 and S2. Regular rate and rhythms. No Pedal edema. Peripheral pulses intact  LUNGS: Equal air entry both lungs. No rales, rhonchi, wheezing. Normal respiratory effort.   ABDOMEN: Suprapubic tenderness+. No hepatomegaly or splenomegaly. Bowel sound normal. Colostomy +  MUSCULOSKELETAL: Normocephalic, atraumatic. No CVA tenderness. No significant deformity or joint abnormality.   SKIN: no lesions or eruptions. Normal turgor  PSYCHIATRIC: A&O x 3, appropriate mood and affect

## 2024-02-22 NOTE — ED PROVIDER NOTE - CLINICAL SUMMARY MEDICAL DECISION MAKING FREE TEXT BOX
protracted history of similar presentation without resolution of symptoms and hx of instrumentation    failed out patient treatment and needs new consideration for stent management

## 2024-02-22 NOTE — H&P ADULT - HISTORY OF PRESENT ILLNESS
58 years old male with h/o HTN, HLD, HIV on HAART ( CD 4 608, VL undetectable on 2/12/24), h/o jared Ca (s/p chemo, radio therapy, surgery, colostomy 03/2022), prostate Ca s/p radiotherapy, ureteral stricture s/p stent ( most recent exchange with Dr Mathew on 12/13/2023), recent admission with UTI ( urine culture with pseudomonas)  present to ED with complain of dysuria, suprapubic pain. Patient was admitted in 01/2024 for pseudomonas UTI, treated with IV cefepime x 7 days with improvement. 5 days after discharge, patient noted to have dysuria again. Seen by urology, intially started on Augmentin which was later switched to ciprofloxacin x 7 days on 2/12/24 after seeing ID in clinic. Patient also received Bicillin L-A IM in ID clinic as well. No fever or chills.   Hemodynamically stable, afebrile, sat well at RA. No leukocytosis, plt 355, K 4.1, Cr 1.15, lactate 2.3. UA dirty. CT abd/pelvis with bilateral ureteral stents. Stable bilateral moderate to severe hydronephrosis without obstructing focus. Bilateral periureteral and perivesicular stranding may be reactive and/or reflect UTI. Lingular consolidation      SH: no toxic habits  FH: HTN

## 2024-02-22 NOTE — CONSULT NOTE ADULT - ASSESSMENT
58M with h/o prostate CA treated with Radiation 4 years ago, subsequently had urethral strictures, requiring stents, last ones placed 12/13/23, this time metal stents placed.  Has had frequent UTI's Ucx showing pseudomanas, here with dysuria, UTI.  No need for urologic surgical intervention, metal stents should not need to be replaced or be the nidus of infection  Needs abx therapy, rec ID consult  Can follow up OP with urology, Dr Mathew  Case discussed with Dr Mathew
58 years old male with h/o HTN, HLD, HIV on HAART ( CD 4 608, VL undetectable on 2/12/24), h/o jared Ca (s/p chemo, radio therapy, surgery, colostomy 03/2022), prostate Ca s/p radiotherapy, ureteral stricture s/p stent ( most recent exchange with Dr Mathew on 12/13/2023), recent admission with UTI ( urine culture with pseudomonas)  present to ED with complain of dysuria, suprapubic pain. Patient was admitted in 01/2024 for pseudomonas UTI, treated with IV cefepime x 7 days with improvement. 5 days after discharge, patient noted to have dysuria again. Seen by urology, intially started on Augmentin which was later switched to ciprofloxacin x 7 days on 2/12/24 after seeing ID in clinic  CT abd/pelvis with bilateral ureteral stents. Stable bilateral moderate to severe hydronephrosis without obstructing focus. 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 He reports getting better last admission when he received antibiotics.   Would treat with either cefepime or Zosyn. and since Zosyn was started already can continue  follow repeat cultures   although the metal stent he has may have a lower risk of infection any foreign material can become infected and we need to be cognizant of this. Chronic suppressive therapy would not work in this case as the quinolones are already resistant which would be the only oral option.   continue his ARVs, he is well controlled     Discussed with Dr. Brendan Jones, DO  Chief, Infectious Disease at [facility]  Reachable via [employer] Teams or ID office: [phone]  Weekdays: After 5pm, please call [phone] for all inquiries and new consults  Weekends: Message on-call infectious disease physician via teams (see Sandy)

## 2024-02-22 NOTE — ED ADULT TRIAGE NOTE - CHIEF COMPLAINT QUOTE
Patient presents to ED c/o penal and lower abdominal pain x 3 weeks. Patient was recently discharged from hospital and completed ABT for UTI. Urinates brown colored urine. Also c/o urgency and frequency. Took oxycodone 10mg with no relief.   hx HTN, prostate Ca, Anal Ca, HIV, stents in kidneys

## 2024-02-22 NOTE — H&P ADULT - ASSESSMENT
58 years old male with h/o HTN, HLD, HIV on HAART ( CD 4 608, VL undetectable on 2/12/24), h/o jared Ca (s/p chemo, radio therapy, surgery, colostomy 03/2022), prostate Ca s/p radiotherapy, ureteral stricture s/p stent ( most recent exchange with Dr Mathew on 12/13/2023), recent admission with UTI ( urine culture with pseudomonas)  present to ED with complain of dysuria, suprapubic pain. Patient was admitted in 01/2024 for pseudomonas UTI, treated with IV cefepime x 7 days with improvement. 5 days after discharge, patient noted to have dysuria again. Seen by urology, intially started on Augmentin which was later switched to ciprofloxacin x 7 days on 2/12/24 after seeing ID in clinic. Patient also received Bicillin L-A IM in ID clinic as well. No fever or chills.   Hemodynamically stable, afebrile, sat well at RA. No leukocytosis, plt 355, K 4.1, Cr 1.15, lactate 2.3. UA dirty. CT abd/pelvis with bilateral ureteral stents. Stable bilateral moderate to severe hydronephrosis without obstructing focus. Bilateral periureteral and perivesicular stranding may be reactive and/or reflect UTI. Lingular consolidation

## 2024-02-23 ENCOUNTER — APPOINTMENT (OUTPATIENT)
Dept: HEART AND VASCULAR | Facility: CLINIC | Age: 59
End: 2024-02-23

## 2024-02-23 LAB
ALBUMIN SERPL ELPH-MCNC: 2.9 G/DL — LOW (ref 3.3–5)
ALP SERPL-CCNC: 90 U/L — SIGNIFICANT CHANGE UP (ref 40–120)
ALT FLD-CCNC: 13 U/L — SIGNIFICANT CHANGE UP (ref 12–78)
ANION GAP SERPL CALC-SCNC: 3 MMOL/L — LOW (ref 5–17)
AST SERPL-CCNC: 10 U/L — LOW (ref 15–37)
BILIRUB SERPL-MCNC: 0.4 MG/DL — SIGNIFICANT CHANGE UP (ref 0.2–1.2)
BUN SERPL-MCNC: 12 MG/DL — SIGNIFICANT CHANGE UP (ref 7–23)
CALCIUM SERPL-MCNC: 9.1 MG/DL — SIGNIFICANT CHANGE UP (ref 8.5–10.1)
CHLORIDE SERPL-SCNC: 110 MMOL/L — HIGH (ref 96–108)
CO2 SERPL-SCNC: 29 MMOL/L — SIGNIFICANT CHANGE UP (ref 22–31)
CREAT SERPL-MCNC: 1.06 MG/DL — SIGNIFICANT CHANGE UP (ref 0.5–1.3)
EGFR: 82 ML/MIN/1.73M2 — SIGNIFICANT CHANGE UP
GLUCOSE SERPL-MCNC: 102 MG/DL — HIGH (ref 70–99)
HCT VFR BLD CALC: 33.7 % — LOW (ref 39–50)
HGB BLD-MCNC: 10.9 G/DL — LOW (ref 13–17)
MAGNESIUM SERPL-MCNC: 1.8 MG/DL — SIGNIFICANT CHANGE UP (ref 1.6–2.6)
MCHC RBC-ENTMCNC: 27.8 PG — SIGNIFICANT CHANGE UP (ref 27–34)
MCHC RBC-ENTMCNC: 32.3 G/DL — SIGNIFICANT CHANGE UP (ref 32–36)
MCV RBC AUTO: 86 FL — SIGNIFICANT CHANGE UP (ref 80–100)
NRBC # BLD: 0 /100 WBCS — SIGNIFICANT CHANGE UP (ref 0–0)
PHOSPHATE SERPL-MCNC: 3.1 MG/DL — SIGNIFICANT CHANGE UP (ref 2.5–4.5)
PLATELET # BLD AUTO: 347 K/UL — SIGNIFICANT CHANGE UP (ref 150–400)
POTASSIUM SERPL-MCNC: 3.3 MMOL/L — LOW (ref 3.5–5.3)
POTASSIUM SERPL-SCNC: 3.3 MMOL/L — LOW (ref 3.5–5.3)
PROT SERPL-MCNC: 7 GM/DL — SIGNIFICANT CHANGE UP (ref 6–8.3)
RBC # BLD: 3.92 M/UL — LOW (ref 4.2–5.8)
RBC # FLD: 13.9 % — SIGNIFICANT CHANGE UP (ref 10.3–14.5)
SODIUM SERPL-SCNC: 142 MMOL/L — SIGNIFICANT CHANGE UP (ref 135–145)
WBC # BLD: 5.55 K/UL — SIGNIFICANT CHANGE UP (ref 3.8–10.5)
WBC # FLD AUTO: 5.55 K/UL — SIGNIFICANT CHANGE UP (ref 3.8–10.5)

## 2024-02-23 PROCEDURE — 99232 SBSQ HOSP IP/OBS MODERATE 35: CPT

## 2024-02-23 RX ORDER — POTASSIUM CHLORIDE 20 MEQ
20 PACKET (EA) ORAL ONCE
Refills: 0 | Status: COMPLETED | OUTPATIENT
Start: 2024-02-23 | End: 2024-02-23

## 2024-02-23 RX ADMIN — Medication 240 MILLIGRAM(S): at 05:45

## 2024-02-23 RX ADMIN — ZOLPIDEM TARTRATE 5 MILLIGRAM(S): 10 TABLET ORAL at 21:42

## 2024-02-23 RX ADMIN — HYDROMORPHONE HYDROCHLORIDE 1 MILLIGRAM(S): 2 INJECTION INTRAMUSCULAR; INTRAVENOUS; SUBCUTANEOUS at 20:52

## 2024-02-23 RX ADMIN — PIPERACILLIN AND TAZOBACTAM 25 GRAM(S): 4; .5 INJECTION, POWDER, LYOPHILIZED, FOR SOLUTION INTRAVENOUS at 01:40

## 2024-02-23 RX ADMIN — HYDROMORPHONE HYDROCHLORIDE 1 MILLIGRAM(S): 2 INJECTION INTRAMUSCULAR; INTRAVENOUS; SUBCUTANEOUS at 15:12

## 2024-02-23 RX ADMIN — SENNA PLUS 2 TABLET(S): 8.6 TABLET ORAL at 21:42

## 2024-02-23 RX ADMIN — HYDROMORPHONE HYDROCHLORIDE 1 MILLIGRAM(S): 2 INJECTION INTRAMUSCULAR; INTRAVENOUS; SUBCUTANEOUS at 16:10

## 2024-02-23 RX ADMIN — LACTULOSE 10 GRAM(S): 10 SOLUTION ORAL at 13:22

## 2024-02-23 RX ADMIN — PIPERACILLIN AND TAZOBACTAM 25 GRAM(S): 4; .5 INJECTION, POWDER, LYOPHILIZED, FOR SOLUTION INTRAVENOUS at 09:39

## 2024-02-23 RX ADMIN — Medication 100 MILLIGRAM(S): at 05:45

## 2024-02-23 RX ADMIN — HYDROMORPHONE HYDROCHLORIDE 1 MILLIGRAM(S): 2 INJECTION INTRAMUSCULAR; INTRAVENOUS; SUBCUTANEOUS at 09:39

## 2024-02-23 RX ADMIN — BICTEGRAVIR SODIUM, EMTRICITABINE, AND TENOFOVIR ALAFENAMIDE FUMARATE 1 TABLET(S): 30; 120; 15 TABLET ORAL at 13:21

## 2024-02-23 RX ADMIN — Medication 20 MILLIEQUIVALENT(S): at 09:39

## 2024-02-23 RX ADMIN — HYDROMORPHONE HYDROCHLORIDE 1 MILLIGRAM(S): 2 INJECTION INTRAMUSCULAR; INTRAVENOUS; SUBCUTANEOUS at 20:22

## 2024-02-23 RX ADMIN — ENOXAPARIN SODIUM 40 MILLIGRAM(S): 100 INJECTION SUBCUTANEOUS at 21:42

## 2024-02-23 RX ADMIN — PIPERACILLIN AND TAZOBACTAM 25 GRAM(S): 4; .5 INJECTION, POWDER, LYOPHILIZED, FOR SOLUTION INTRAVENOUS at 17:02

## 2024-02-23 RX ADMIN — HYDROMORPHONE HYDROCHLORIDE 1 MILLIGRAM(S): 2 INJECTION INTRAMUSCULAR; INTRAVENOUS; SUBCUTANEOUS at 10:35

## 2024-02-23 NOTE — PROGRESS NOTE ADULT - ASSESSMENT
A/P     Problem/Plan - 1:  ·  Problem: Complicated UTI (urinary tract infection).   ·  Plan: For recent urine culture with pseudomonas, continue with zosyn  Follow up blood and urine culture   Pain control--> IV dilaudid prn for severe pain  Urology consulted - stent removal will be done on Monday   ID following     Problem/Plan - 2:  ·  Problem: Benign essential HTN.   ·  Plan: monitor BP and titrate BP meds.     Problem/Plan - 3:  ·  Problem: Hyperlipidemia, unspecified.   ·  Plan: On Zetia at home.     Problem/Plan - 4:  ·  Problem: HIV disease.   ·  Plan: HIV on HAART ( CD 4 608, VL undetectable on 2/12/24)  Continue Biktarvy.     Problem/Plan - 5:  ·  Problem: Constipation due to opioid therapy.   ·  Plan: continue lactulose and senna.    Dispo: Discharge early next week - likely Tuesday.     Billy Ornelas MD

## 2024-02-23 NOTE — PROGRESS NOTE ADULT - SUBJECTIVE AND OBJECTIVE BOX
SURGERY PROGRESS HPI:  Pt seen and examined at bedside. Pt says pain is improving. No acute events overnight. Pt tolerating diet. Pt denies nausea and vomiting.       Vital Signs Last 24 Hrs  T(C): 37.2 (23 Feb 2024 04:41), Max: 37.3 (22 Feb 2024 15:24)  T(F): 98.9 (23 Feb 2024 04:41), Max: 99.1 (22 Feb 2024 15:24)  HR: 68 (23 Feb 2024 07:01) (68 - 85)  BP: 125/87 (23 Feb 2024 04:41) (105/63 - 153/82)  BP(mean): 81 (22 Feb 2024 11:21) (81 - 81)  RR: 16 (23 Feb 2024 04:41) (16 - 18)  SpO2: 96% (23 Feb 2024 04:41) (96% - 98%)    Parameters below as of 22 Feb 2024 23:29  Patient On (Oxygen Delivery Method): room air          PHYSICAL EXAM:    GENERAL: NAD  HEAD:  Atraumatic, Normocephalic  CHEST/LUNG: Normal work of breathing  HEART: RRR   ABDOMEN: non distended, soft, non tender, no guarding, ostomy viable with stool in bag  EXTREMITIES:  calf soft, non tender b/l

## 2024-02-23 NOTE — PROGRESS NOTE ADULT - ASSESSMENT
58M with h/o prostate CA treated with Radiation 4 years ago, subsequently had urethral strictures, requiring stents, last ones placed 12/13/23, this time metal stents placed.  Has had frequent UTI's Ucx showing pseudomanas, here with dysuria, UTI.  Had long discussion bedside last night with Dr Bautista and pt, pt's wife.  As ID note states, though low incidence of metal stents causing infection it is possible this is the case with this pt.  Pt has repeatedly presented with pseudamonas infection, might benefit from removal of stents, as severe b/l hydro is persisting as well.  Would also recommend b/l PCN's.   This morning pt said he is agreeable to stent removal and PCN placement.  Will book for monday, to allow for continued zosyn over the weekend   Case discussed with Dr Bautista

## 2024-02-23 NOTE — PROGRESS NOTE ADULT - SUBJECTIVE AND OBJECTIVE BOX
Patient is a 58y old  Male who presents with a chief complaint of complicated UTI due to pseudomonas (23 Feb 2024 10:28)      INTERVAL HPI/OVERNIGHT EVENTS: No acute events overnight.     MEDICATIONS  (STANDING):  bictegravir 50 mG/emtricitabine 200 mG/tenofovir alafenamide 25 mG (BIKTARVY) 1 Tablet(s) Oral daily  diltiazem    milliGRAM(s) Oral daily  enoxaparin Injectable 40 milliGRAM(s) SubCutaneous every 24 hours  lactulose Syrup 10 Gram(s) Oral daily  metoprolol succinate  milliGRAM(s) Oral daily  piperacillin/tazobactam IVPB.. 3.375 Gram(s) IV Intermittent every 8 hours  senna 2 Tablet(s) Oral at bedtime    MEDICATIONS  (PRN):  acetaminophen     Tablet .. 650 milliGRAM(s) Oral every 6 hours PRN Temp greater or equal to 38C (100.4F), Mild Pain (1 - 3)  ALPRAZolam 1 milliGRAM(s) Oral every 6 hours PRN for anxiety  HYDROmorphone  Injectable 1 milliGRAM(s) IV Push every 4 hours PRN Severe Pain (7 - 10)  ondansetron Injectable 4 milliGRAM(s) IV Push every 8 hours PRN Nausea and/or Vomiting  oxyCODONE    IR 10 milliGRAM(s) Oral every 6 hours PRN Moderate Pain (4 - 6)  zolpidem 5 milliGRAM(s) Oral at bedtime PRN Insomnia  zolpidem 5 milliGRAM(s) Oral at bedtime PRN Insomnia      Allergies    No Known Allergies    Intolerances        REVIEW OF SYSTEMS:  CONSTITUTIONAL: +ve for fatigue  EYES: No eye pain, visual disturbances, or discharge  ENMT:  No difficulty hearing, tinnitus, vertigo; No sinus or throat pain  NECK: No pain or stiffness      Vital Signs Last 24 Hrs  T(C): 37.2 (23 Feb 2024 04:41), Max: 37.3 (22 Feb 2024 15:24)  T(F): 98.9 (23 Feb 2024 04:41), Max: 99.1 (22 Feb 2024 15:24)  HR: 68 (23 Feb 2024 07:01) (68 - 85)  BP: 125/87 (23 Feb 2024 04:41) (105/63 - 153/82)  BP(mean): 81 (22 Feb 2024 11:21) (81 - 81)  RR: 16 (23 Feb 2024 04:41) (16 - 18)  SpO2: 96% (23 Feb 2024 04:41) (96% - 98%)    Parameters below as of 22 Feb 2024 23:29  Patient On (Oxygen Delivery Method): room air        PHYSICAL EXAM:    HEAD:  Atraumatic, Normocephalic  EYES: EOMI, PERRLA, conjunctiva and sclera clear  ENMT: No tonsillar erythema, exudates, or enlargement; Moist mucous membranes, Good dentition, No lesions  NECK: Supple, No JVD, Normal thyroid  NERVOUS SYSTEM:  Alert & Oriented X3    LABS:                        10.9   5.55  )-----------( 347      ( 23 Feb 2024 06:34 )             33.7     02-23    142  |  110<H>  |  12  ----------------------------<  102<H>  3.3<L>   |  29  |  1.06    Ca    9.1      23 Feb 2024 06:34  Phos  3.1     02-23  Mg     1.8     02-23    TPro  7.0  /  Alb  2.9<L>  /  TBili  0.4  /  DBili  x   /  AST  10<L>  /  ALT  13  /  AlkPhos  90  02-23    PT/INR - ( 22 Feb 2024 09:20 )   PT: 12.2 sec;   INR: 1.03 ratio         PTT - ( 22 Feb 2024 09:20 )  PTT:33.8 sec  Urinalysis Basic - ( 23 Feb 2024 06:34 )    Color: x / Appearance: x / SG: x / pH: x  Gluc: 102 mg/dL / Ketone: x  / Bili: x / Urobili: x   Blood: x / Protein: x / Nitrite: x   Leuk Esterase: x / RBC: x / WBC x   Sq Epi: x / Non Sq Epi: x / Bacteria: x

## 2024-02-24 PROCEDURE — 99232 SBSQ HOSP IP/OBS MODERATE 35: CPT

## 2024-02-24 RX ORDER — POTASSIUM CHLORIDE 20 MEQ
40 PACKET (EA) ORAL ONCE
Refills: 0 | Status: COMPLETED | OUTPATIENT
Start: 2024-02-24 | End: 2024-02-24

## 2024-02-24 RX ADMIN — HYDROMORPHONE HYDROCHLORIDE 1 MILLIGRAM(S): 2 INJECTION INTRAMUSCULAR; INTRAVENOUS; SUBCUTANEOUS at 09:40

## 2024-02-24 RX ADMIN — PIPERACILLIN AND TAZOBACTAM 25 GRAM(S): 4; .5 INJECTION, POWDER, LYOPHILIZED, FOR SOLUTION INTRAVENOUS at 17:03

## 2024-02-24 RX ADMIN — PIPERACILLIN AND TAZOBACTAM 25 GRAM(S): 4; .5 INJECTION, POWDER, LYOPHILIZED, FOR SOLUTION INTRAVENOUS at 11:23

## 2024-02-24 RX ADMIN — PIPERACILLIN AND TAZOBACTAM 25 GRAM(S): 4; .5 INJECTION, POWDER, LYOPHILIZED, FOR SOLUTION INTRAVENOUS at 02:18

## 2024-02-24 RX ADMIN — Medication 40 MILLIEQUIVALENT(S): at 09:24

## 2024-02-24 RX ADMIN — HYDROMORPHONE HYDROCHLORIDE 1 MILLIGRAM(S): 2 INJECTION INTRAMUSCULAR; INTRAVENOUS; SUBCUTANEOUS at 21:34

## 2024-02-24 RX ADMIN — HYDROMORPHONE HYDROCHLORIDE 1 MILLIGRAM(S): 2 INJECTION INTRAMUSCULAR; INTRAVENOUS; SUBCUTANEOUS at 09:24

## 2024-02-24 RX ADMIN — HYDROMORPHONE HYDROCHLORIDE 1 MILLIGRAM(S): 2 INJECTION INTRAMUSCULAR; INTRAVENOUS; SUBCUTANEOUS at 15:32

## 2024-02-24 RX ADMIN — SENNA PLUS 2 TABLET(S): 8.6 TABLET ORAL at 21:33

## 2024-02-24 RX ADMIN — BICTEGRAVIR SODIUM, EMTRICITABINE, AND TENOFOVIR ALAFENAMIDE FUMARATE 1 TABLET(S): 30; 120; 15 TABLET ORAL at 11:24

## 2024-02-24 RX ADMIN — ENOXAPARIN SODIUM 40 MILLIGRAM(S): 100 INJECTION SUBCUTANEOUS at 21:34

## 2024-02-24 RX ADMIN — Medication 100 MILLIGRAM(S): at 05:28

## 2024-02-24 RX ADMIN — LACTULOSE 10 GRAM(S): 10 SOLUTION ORAL at 11:23

## 2024-02-24 RX ADMIN — HYDROMORPHONE HYDROCHLORIDE 1 MILLIGRAM(S): 2 INJECTION INTRAMUSCULAR; INTRAVENOUS; SUBCUTANEOUS at 15:55

## 2024-02-24 RX ADMIN — HYDROMORPHONE HYDROCHLORIDE 1 MILLIGRAM(S): 2 INJECTION INTRAMUSCULAR; INTRAVENOUS; SUBCUTANEOUS at 22:57

## 2024-02-24 NOTE — PROGRESS NOTE ADULT - SUBJECTIVE AND OBJECTIVE BOX
Patient seen and examined bedside resting comfortably.  C/o constant lower abdominal pain  No dysuria or issues voiding  Afebrile. No n/v    T(F): 98.5 (02-24-24 @ 16:16), Max: 98.9 (02-23-24 @ 20:16)  HR: 69 (02-24-24 @ 16:16) (63 - 78)  BP: 132/90 (02-24-24 @ 16:16) (112/73 - 132/90)  RR: 18 (02-24-24 @ 16:16) (18 - 18)  SpO2: 95% (02-24-24 @ 16:16) (95% - 97%)  Wt(kg): --    PHYSICAL EXAM:  General: NAD, alert and awake  HEENT: NCAT, EOMI  Chest: nonlabored respirations  Abdomen: soft, NT. Colostomy  Extremities: no pedal edema or calf tenderness   : mild SP tenderness    LABS:                        10.9   5.55  )-----------( 347      ( 23 Feb 2024 06:34 )             33.7   02-23    142  |  110<H>  |  12  ----------------------------<  102<H>  3.3<L>   |  29  |  1.06    Ca    9.1      23 Feb 2024 06:34  Phos  3.1     02-23  Mg     1.8     02-23    TPro  7.0  /  Alb  2.9<L>  /  TBili  0.4  /  DBili  x   /  AST  10<L>  /  ALT  13  /  AlkPhos  90  02-23    I&O's Detail    23 Feb 2024 07:01  -  24 Feb 2024 07:00  --------------------------------------------------------  IN:    IV PiggyBack: 300 mL    Oral Fluid: 670 mL  Total IN: 970 mL    OUT:    Voided (mL): 500 mL  Total OUT: 500 mL    Total NET: 470 mL      24 Feb 2024 07:01  -  24 Feb 2024 19:48  --------------------------------------------------------  IN:    IV PiggyBack: 200 mL    Oral Fluid: 840 mL  Total IN: 1040 mL    OUT:    Colostomy (mL): 300 mL  Total OUT: 300 mL    Total NET: 740 mL    A/P: 58M with h/o prostate CA treated with Radiation 4 years ago, subsequently had ureteral strictures requiring stents. Presently seen with b/l hydronephrosis despite current indwelling metal stents as well as recurrent pseudomonas UTI.  as discussed with Dr Bautista, plan for B/l PCN placement by IR on Monday  Pt and wife in agreement. Preop.  Continue IV zosyn.  Will also plan for metal stent removal this admission likely Wed or Thurs per Dr Bautista  Continue medical management per primary team

## 2024-02-24 NOTE — PROGRESS NOTE ADULT - SUBJECTIVE AND OBJECTIVE BOX
Patient is a 58y old  Male who presents with a chief complaint of complicated UTI due to pseudomonas (23 Feb 2024 10:52)    INTERVAL HPI/OVERNIGHT EVENTS: Patients seen and examined at bedside this morning. No acute events overnight.     MEDICATIONS  (STANDING):  bictegravir 50 mG/emtricitabine 200 mG/tenofovir alafenamide 25 mG (BIKTARVY) 1 Tablet(s) Oral daily  diltiazem    milliGRAM(s) Oral daily  enoxaparin Injectable 40 milliGRAM(s) SubCutaneous every 24 hours  lactulose Syrup 10 Gram(s) Oral daily  metoprolol succinate  milliGRAM(s) Oral daily  piperacillin/tazobactam IVPB.. 3.375 Gram(s) IV Intermittent every 8 hours  potassium chloride    Tablet ER 40 milliEquivalent(s) Oral once  senna 2 Tablet(s) Oral at bedtime    MEDICATIONS  (PRN):  acetaminophen     Tablet .. 650 milliGRAM(s) Oral every 6 hours PRN Temp greater or equal to 38C (100.4F), Mild Pain (1 - 3)  ALPRAZolam 1 milliGRAM(s) Oral every 6 hours PRN for anxiety  HYDROmorphone  Injectable 1 milliGRAM(s) IV Push every 4 hours PRN Severe Pain (7 - 10)  ondansetron Injectable 4 milliGRAM(s) IV Push every 8 hours PRN Nausea and/or Vomiting  oxyCODONE    IR 10 milliGRAM(s) Oral every 6 hours PRN Moderate Pain (4 - 6)  zolpidem 5 milliGRAM(s) Oral at bedtime PRN Insomnia  zolpidem 5 milliGRAM(s) Oral at bedtime PRN Insomnia    Allergies    No Known Allergies    Intolerances      REVIEW OF SYSTEMS:  All other systems reviewed and are negative    Vital Signs Last 24 Hrs  T(C): 36.6 (24 Feb 2024 05:05), Max: 37.2 (23 Feb 2024 16:43)  T(F): 97.9 (24 Feb 2024 05:05), Max: 98.9 (23 Feb 2024 16:43)  HR: 78 (24 Feb 2024 05:05) (63 - 81)  BP: 112/73 (24 Feb 2024 05:05) (112/73 - 151/75)  BP(mean): --  RR: 18 (24 Feb 2024 05:05) (16 - 18)  SpO2: 97% (24 Feb 2024 05:05) (96% - 98%)    Parameters below as of 24 Feb 2024 05:05  Patient On (Oxygen Delivery Method): room air      Daily     Daily   I&O's Summary    23 Feb 2024 07:01  -  24 Feb 2024 07:00  --------------------------------------------------------  IN: 970 mL / OUT: 500 mL / NET: 470 mL      CAPILLARY BLOOD GLUCOSE        PHYSICAL EXAM:  HEAD:  Atraumatic, Normocephalic  EYES: EOMI, PERRLA, conjunctiva and sclera clear  ENMT: No tonsillar erythema, exudates, or enlargement; Moist mucous membranes, Good dentition, No lesions  NECK: Supple, No JVD, Normal thyroid  NERVOUS SYSTEM:  Alert & Oriented X3      Labs                          10.9   5.55  )-----------( 347      ( 23 Feb 2024 06:34 )             33.7     02-23    142  |  110<H>  |  12  ----------------------------<  102<H>  3.3<L>   |  29  |  1.06    Ca    9.1      23 Feb 2024 06:34  Phos  3.1     02-23  Mg     1.8     02-23    TPro  7.0  /  Alb  2.9<L>  /  TBili  0.4  /  DBili  x   /  AST  10<L>  /  ALT  13  /  AlkPhos  90  02-23    PT/INR - ( 22 Feb 2024 09:20 )   PT: 12.2 sec;   INR: 1.03 ratio         PTT - ( 22 Feb 2024 09:20 )  PTT:33.8 sec      Urinalysis Basic - ( 23 Feb 2024 06:34 )    Color: x / Appearance: x / SG: x / pH: x  Gluc: 102 mg/dL / Ketone: x  / Bili: x / Urobili: x   Blood: x / Protein: x / Nitrite: x   Leuk Esterase: x / RBC: x / WBC x   Sq Epi: x / Non Sq Epi: x / Bacteria: x        Culture - Urine (collected 22 Feb 2024 09:20)  Source: Clean Catch Clean Catch (Midstream)  Preliminary Report (23 Feb 2024 18:57):    >100,000 CFU/ml Gram Negative Rods    Culture - Blood (collected 22 Feb 2024 09:20)  Source: .Blood Blood-Peripheral  Preliminary Report (23 Feb 2024 15:02):    No growth at 24 hours    Culture - Blood (collected 22 Feb 2024 09:20)  Source: .Blood Blood-Peripheral  Preliminary Report (23 Feb 2024 15:02):    No growth at 24 hours                    Radiology and Imaging reviewed.

## 2024-02-24 NOTE — PROGRESS NOTE ADULT - ASSESSMENT
Problem/Plan - 1:  ·  Problem: Complicated UTI (urinary tract infection).   ·  Plan: For recent urine culture with pseudomonas, continue with zosyn  Follow up blood and urine culture   Pain control--> IV dilaudid prn for severe pain  Urology consulted - stent removal will be done on Monday   ID following     Problem/Plan - 2:  ·  Problem: Benign essential HTN.   ·  Plan: monitor BP and titrate BP meds.     Problem/Plan - 3:  ·  Problem: Hyperlipidemia, unspecified.   ·  Plan: On Zetia at home.     Problem/Plan - 4:  ·  Problem: HIV disease.   ·  Plan: HIV on HAART ( CD 4 608, VL undetectable on 2/12/24)  Continue Biktarvy.     Problem/Plan - 5:  ·  Problem: Constipation due to opioid therapy.   ·  Plan: continue lactulose and senna.    Dispo: Discharge early next week - likely Tuesday.

## 2024-02-25 LAB
ANION GAP SERPL CALC-SCNC: 3 MMOL/L — LOW (ref 5–17)
BUN SERPL-MCNC: 16 MG/DL — SIGNIFICANT CHANGE UP (ref 7–23)
CALCIUM SERPL-MCNC: 9.2 MG/DL — SIGNIFICANT CHANGE UP (ref 8.5–10.1)
CHLORIDE SERPL-SCNC: 110 MMOL/L — HIGH (ref 96–108)
CO2 SERPL-SCNC: 29 MMOL/L — SIGNIFICANT CHANGE UP (ref 22–31)
CREAT SERPL-MCNC: 1.08 MG/DL — SIGNIFICANT CHANGE UP (ref 0.5–1.3)
EGFR: 80 ML/MIN/1.73M2 — SIGNIFICANT CHANGE UP
GLUCOSE SERPL-MCNC: 90 MG/DL — SIGNIFICANT CHANGE UP (ref 70–99)
MAGNESIUM SERPL-MCNC: 1.8 MG/DL — SIGNIFICANT CHANGE UP (ref 1.6–2.6)
POTASSIUM SERPL-MCNC: 3.3 MMOL/L — LOW (ref 3.5–5.3)
POTASSIUM SERPL-SCNC: 3.3 MMOL/L — LOW (ref 3.5–5.3)
SODIUM SERPL-SCNC: 142 MMOL/L — SIGNIFICANT CHANGE UP (ref 135–145)

## 2024-02-25 PROCEDURE — 99232 SBSQ HOSP IP/OBS MODERATE 35: CPT

## 2024-02-25 RX ORDER — TAMSULOSIN HYDROCHLORIDE 0.4 MG/1
1 CAPSULE ORAL
Refills: 0 | DISCHARGE

## 2024-02-25 RX ORDER — METOPROLOL TARTRATE 50 MG
1 TABLET ORAL
Qty: 0 | Refills: 0 | DISCHARGE

## 2024-02-25 RX ORDER — SENNA PLUS 8.6 MG/1
2 TABLET ORAL
Refills: 0 | DISCHARGE

## 2024-02-25 RX ORDER — DILTIAZEM HCL 120 MG
1 CAPSULE, EXT RELEASE 24 HR ORAL
Refills: 0 | DISCHARGE

## 2024-02-25 RX ORDER — OLANZAPINE 15 MG/1
1 TABLET, FILM COATED ORAL
Refills: 0 | DISCHARGE

## 2024-02-25 RX ORDER — OMEPRAZOLE 10 MG/1
1 CAPSULE, DELAYED RELEASE ORAL
Refills: 0 | DISCHARGE

## 2024-02-25 RX ORDER — MORPHINE SULFATE 50 MG/1
1 CAPSULE, EXTENDED RELEASE ORAL
Refills: 0 | DISCHARGE

## 2024-02-25 RX ORDER — FUROSEMIDE 40 MG
1 TABLET ORAL
Refills: 0 | DISCHARGE

## 2024-02-25 RX ORDER — LACTULOSE 10 G/15ML
15 SOLUTION ORAL
Refills: 0 | DISCHARGE

## 2024-02-25 RX ORDER — EZETIMIBE 10 MG/1
1 TABLET ORAL
Refills: 0 | DISCHARGE

## 2024-02-25 RX ORDER — POTASSIUM CHLORIDE 20 MEQ
40 PACKET (EA) ORAL ONCE
Refills: 0 | Status: COMPLETED | OUTPATIENT
Start: 2024-02-25 | End: 2024-02-25

## 2024-02-25 RX ORDER — ZOLPIDEM TARTRATE 10 MG/1
1 TABLET ORAL
Refills: 0 | DISCHARGE

## 2024-02-25 RX ORDER — CICLOPIROX OLAMINE 7.7 MG/G
1 CREAM TOPICAL
Refills: 0 | DISCHARGE

## 2024-02-25 RX ORDER — CHOLECALCIFEROL (VITAMIN D3) 125 MCG
1 CAPSULE ORAL
Refills: 0 | DISCHARGE

## 2024-02-25 RX ADMIN — ZOLPIDEM TARTRATE 5 MILLIGRAM(S): 10 TABLET ORAL at 21:12

## 2024-02-25 RX ADMIN — Medication 100 MILLIGRAM(S): at 05:32

## 2024-02-25 RX ADMIN — HYDROMORPHONE HYDROCHLORIDE 1 MILLIGRAM(S): 2 INJECTION INTRAMUSCULAR; INTRAVENOUS; SUBCUTANEOUS at 11:19

## 2024-02-25 RX ADMIN — HYDROMORPHONE HYDROCHLORIDE 1 MILLIGRAM(S): 2 INJECTION INTRAMUSCULAR; INTRAVENOUS; SUBCUTANEOUS at 07:58

## 2024-02-25 RX ADMIN — HYDROMORPHONE HYDROCHLORIDE 1 MILLIGRAM(S): 2 INJECTION INTRAMUSCULAR; INTRAVENOUS; SUBCUTANEOUS at 07:06

## 2024-02-25 RX ADMIN — HYDROMORPHONE HYDROCHLORIDE 1 MILLIGRAM(S): 2 INJECTION INTRAMUSCULAR; INTRAVENOUS; SUBCUTANEOUS at 16:50

## 2024-02-25 RX ADMIN — PIPERACILLIN AND TAZOBACTAM 25 GRAM(S): 4; .5 INJECTION, POWDER, LYOPHILIZED, FOR SOLUTION INTRAVENOUS at 05:32

## 2024-02-25 RX ADMIN — HYDROMORPHONE HYDROCHLORIDE 1 MILLIGRAM(S): 2 INJECTION INTRAMUSCULAR; INTRAVENOUS; SUBCUTANEOUS at 16:31

## 2024-02-25 RX ADMIN — HYDROMORPHONE HYDROCHLORIDE 1 MILLIGRAM(S): 2 INJECTION INTRAMUSCULAR; INTRAVENOUS; SUBCUTANEOUS at 11:35

## 2024-02-25 RX ADMIN — Medication 40 MILLIEQUIVALENT(S): at 11:02

## 2024-02-25 RX ADMIN — Medication 240 MILLIGRAM(S): at 05:32

## 2024-02-25 RX ADMIN — PIPERACILLIN AND TAZOBACTAM 25 GRAM(S): 4; .5 INJECTION, POWDER, LYOPHILIZED, FOR SOLUTION INTRAVENOUS at 17:06

## 2024-02-25 RX ADMIN — BICTEGRAVIR SODIUM, EMTRICITABINE, AND TENOFOVIR ALAFENAMIDE FUMARATE 1 TABLET(S): 30; 120; 15 TABLET ORAL at 11:21

## 2024-02-25 RX ADMIN — PIPERACILLIN AND TAZOBACTAM 25 GRAM(S): 4; .5 INJECTION, POWDER, LYOPHILIZED, FOR SOLUTION INTRAVENOUS at 11:02

## 2024-02-25 NOTE — ASU PATIENT PROFILE, ADULT - NSSTREETDRUGTY_GEN_ALL_CORE_SD
[de-identified] : Constitutional:  72 year old female, alert and oriented, cooperative, in no acute distress.  HEENT  NC/AT.  Appearance: symmetric  Neck/Back Straight without deformity or instability.  Good ROM.  Chest/Respiratory  Respiratory effort: no intercostal retractions or use of accessory muscles. Nonlabored Breathing  Skin  On inspection, warm and dry without rashes or lesions.  Mental Status:  Judgment, insight: intact Orientation: oriented to time, place, and person  Neurological: Sensory and Motor are grossly intact throughout  Right Knee  Inspection:     Incision well healed. No erythema or drainage     No Effusion     Non-tender to palpation over tibial tubercle, patella, medial and lateral joint line, and pes insertion.  Range of Motion: 	Extension - 0 degrees 	Flexion - 105 degrees 	Alignment - Valgus 3 degrees 	Extensor lag: None  Stability:      Demonstrates no Varus or Valgus instability      Negative Anterior or Posterior drawer.      No mid flexion instability  Patella: stable, tracks well.   Neurologic Exam     Motor intact including 5/5 Extensor Hallucis Longus, 5/5 Flexor Hallucis Longus, 5/5 Tibialis Anterior and 5/5 Gastrocnemius     Sensation Intact to Light Touch including Saphenous, Sural, Superficial Peroneal, Deep Peroneal, Tibial nerve distributions  Vascular Exam     Foot is warm and well perfused with 2+ Dorsalis Pedis Pulse   No pain with range of motion of the bilateral hips or left knee. No lumbar paraspinal muscle tenderness. [de-identified] : XRay:  XRays of the Right Knee (3 Views) taken in the office today and reviewed with the patient. XRays demonstrate a Right Total Knee Arthroplasty in good position and alignment. There is no obvious evidence of fracture, dislocation, osteolysis or loosening. (my personal interpretation) Components: [employer] CS Cemented marijuana

## 2024-02-25 NOTE — PROGRESS NOTE ADULT - SUBJECTIVE AND OBJECTIVE BOX
Patient is a 58y old  Male who presents with a chief complaint of complicated UTI due to pseudomonas (24 Feb 2024 19:47)    INTERVAL HPI/OVERNIGHT EVENTS: Patients seen and examined at bedside this morning. No acute events overnight.     MEDICATIONS  (STANDING):  bictegravir 50 mG/emtricitabine 200 mG/tenofovir alafenamide 25 mG (BIKTARVY) 1 Tablet(s) Oral daily  diltiazem    milliGRAM(s) Oral daily  enoxaparin Injectable 40 milliGRAM(s) SubCutaneous every 24 hours  lactulose Syrup 10 Gram(s) Oral daily  metoprolol succinate  milliGRAM(s) Oral daily  piperacillin/tazobactam IVPB.. 3.375 Gram(s) IV Intermittent every 8 hours  potassium chloride    Tablet ER 40 milliEquivalent(s) Oral once  senna 2 Tablet(s) Oral at bedtime    MEDICATIONS  (PRN):  acetaminophen     Tablet .. 650 milliGRAM(s) Oral every 6 hours PRN Temp greater or equal to 38C (100.4F), Mild Pain (1 - 3)  ALPRAZolam 1 milliGRAM(s) Oral every 6 hours PRN for anxiety  HYDROmorphone  Injectable 1 milliGRAM(s) IV Push every 4 hours PRN Severe Pain (7 - 10)  ondansetron Injectable 4 milliGRAM(s) IV Push every 8 hours PRN Nausea and/or Vomiting  oxyCODONE    IR 10 milliGRAM(s) Oral every 6 hours PRN Moderate Pain (4 - 6)  zolpidem 5 milliGRAM(s) Oral at bedtime PRN Insomnia  zolpidem 5 milliGRAM(s) Oral at bedtime PRN Insomnia    Allergies    No Known Allergies    Intolerances      REVIEW OF SYSTEMS:  All other systems reviewed and are negative    Vital Signs Last 24 Hrs  T(C): 36.8 (25 Feb 2024 04:50), Max: 36.9 (24 Feb 2024 10:39)  T(F): 98.3 (25 Feb 2024 04:50), Max: 98.5 (24 Feb 2024 10:39)  HR: 77 (25 Feb 2024 04:50) (65 - 77)  BP: 144/91 (25 Feb 2024 04:50) (131/81 - 144/91)  BP(mean): --  RR: 18 (25 Feb 2024 04:50) (18 - 18)  SpO2: 96% (25 Feb 2024 04:50) (95% - 97%)    Parameters below as of 24 Feb 2024 23:39  Patient On (Oxygen Delivery Method): room air      Daily     Daily   I&O's Summary    24 Feb 2024 07:01  -  25 Feb 2024 07:00  --------------------------------------------------------  IN: 1040 mL / OUT: 500 mL / NET: 540 mL      CAPILLARY BLOOD GLUCOSE        PHYSICAL EXAM:  HEAD:  Atraumatic, Normocephalic  EYES: EOMI, PERRLA, conjunctiva and sclera clear  ENMT: No tonsillar erythema, exudates, or enlargement; Moist mucous membranes, Good dentition, No lesions  NECK: Supple, No JVD, Normal thyroid  NERVOUS SYSTEM:  Alert & Oriented X3      Labs      02-25    142  |  110<H>  |  16  ----------------------------<  90  3.3<L>   |  29  |  1.08    Ca    9.2      25 Feb 2024 05:26  Mg     1.8     02-25            Urinalysis Basic - ( 25 Feb 2024 05:26 )    Color: x / Appearance: x / SG: x / pH: x  Gluc: 90 mg/dL / Ketone: x  / Bili: x / Urobili: x   Blood: x / Protein: x / Nitrite: x   Leuk Esterase: x / RBC: x / WBC x   Sq Epi: x / Non Sq Epi: x / Bacteria: x        Culture - Blood (collected 22 Feb 2024 09:20)  Source: .Blood Blood-Peripheral  Preliminary Report (24 Feb 2024 15:12):    No growth at 48 Hours    Culture - Urine (collected 22 Feb 2024 09:20)  Source: Clean Catch Clean Catch (Midstream)  Preliminary Report (24 Feb 2024 11:12):    >100,000 CFU/ml Pseudomonas aeruginosa    Culture - Blood (collected 22 Feb 2024 09:20)  Source: .Blood Blood-Peripheral  Preliminary Report (24 Feb 2024 15:12):    No growth at 48 Hours                    Radiology and Imaging reviewed.

## 2024-02-25 NOTE — PROGRESS NOTE ADULT - ASSESSMENT
58M with h/o prostate CA treated with Radiation 4 years ago, subsequently had ureteral strictures requiring stents. Presently seen with b/l hydronephrosis despite current indwelling metal stents as well as recurrent pseudomonas UTI.  as discussed with Dr Bautista, plan for B/l PCN placement by IR on Monday  Pt and wife in agreement. Preop.  Continue IV zosyn.  Will also plan for metal stent removal this admission per Dr Bautista  Continue medical management per primary team  Discussed with Dr. Bautista

## 2024-02-25 NOTE — PROGRESS NOTE ADULT - SUBJECTIVE AND OBJECTIVE BOX
Patient seen and examined bedside resting comfortably.  C/o constant lower abdominal pain  No dysuria or issues voiding  Afebrile. No n/v      MEDICATIONS  (STANDING):  bictegravir 50 mG/emtricitabine 200 mG/tenofovir alafenamide 25 mG (BIKTARVY) 1 Tablet(s) Oral daily  diltiazem    milliGRAM(s) Oral daily  enoxaparin Injectable 40 milliGRAM(s) SubCutaneous every 24 hours  lactulose Syrup 10 Gram(s) Oral daily  metoprolol succinate  milliGRAM(s) Oral daily  piperacillin/tazobactam IVPB.. 3.375 Gram(s) IV Intermittent every 8 hours  senna 2 Tablet(s) Oral at bedtime    MEDICATIONS  (PRN):  acetaminophen     Tablet .. 650 milliGRAM(s) Oral every 6 hours PRN Temp greater or equal to 38C (100.4F), Mild Pain (1 - 3)  ALPRAZolam 1 milliGRAM(s) Oral every 6 hours PRN for anxiety  HYDROmorphone  Injectable 1 milliGRAM(s) IV Push every 4 hours PRN Severe Pain (7 - 10)  ondansetron Injectable 4 milliGRAM(s) IV Push every 8 hours PRN Nausea and/or Vomiting  oxyCODONE    IR 10 milliGRAM(s) Oral every 6 hours PRN Moderate Pain (4 - 6)  zolpidem 5 milliGRAM(s) Oral at bedtime PRN Insomnia  zolpidem 5 milliGRAM(s) Oral at bedtime PRN Insomnia      Vital Signs Last 24 Hrs  T(C): 36.7 (25 Feb 2024 10:36), Max: 36.9 (24 Feb 2024 16:16)  T(F): 98.1 (25 Feb 2024 10:36), Max: 98.5 (24 Feb 2024 16:16)  HR: 87 (25 Feb 2024 10:36) (65 - 87)  BP: 148/109 (25 Feb 2024 10:36) (131/81 - 148/109)  RR: 18 (25 Feb 2024 10:36) (18 - 18)  SpO2: 98% (25 Feb 2024 10:36) (95% - 98%)    Parameters below as of 24 Feb 2024 23:39  Patient On (Oxygen Delivery Method): room air      PHYSICAL EXAM:  General: NAD, alert and awake  HEENT: NCAT, EOMI  Chest: nonlabored respirations  Abdomen: soft, NT. Colostomy  Extremities: no pedal edema or calf tenderness   : mild SP tenderness      I&O's Detail    24 Feb 2024 07:01  -  25 Feb 2024 07:00  --------------------------------------------------------  IN:    IV PiggyBack: 200 mL    Oral Fluid: 840 mL  Total IN: 1040 mL    OUT:    Colostomy (mL): 500 mL  Total OUT: 500 mL    Total NET: 540 mL          LABS:    02-25    142  |  110<H>  |  16  ----------------------------<  90  3.3<L>   |  29  |  1.08    Ca    9.2      25 Feb 2024 05:26  Mg     1.8     02-25        Urinalysis Basic - ( 25 Feb 2024 05:26 )    Color: x / Appearance: x / SG: x / pH: x  Gluc: 90 mg/dL / Ketone: x  / Bili: x / Urobili: x   Blood: x / Protein: x / Nitrite: x   Leuk Esterase: x / RBC: x / WBC x   Sq Epi: x / Non Sq Epi: x / Bacteria: x

## 2024-02-25 NOTE — PHARMACOTHERAPY INTERVENTION NOTE - COMMENTS
Performed medication reconciliation and home medication list updated in outpatient medication review.     Medication list per pharmacy:   ·	Alprazolam 1mg tablet: Take 1 tablet by mouth every 6 hours as needed for anxiety. #30. 12/20/23  ·	Ambien 10mg Tablet. Take 1 tablet by mouth at bedtime. #30. 02/08/2024  ·	Augmentin 875/125 Tablet 1 Tablet twice a day. #14. 02/07/24  ·	Biktarvy 50/200/25 Tablet. Take 1 Tablet by mouth once daily. #30 02/05/24  ·	Ciclopirox 8% Solution. Apply to Affected nail area. Remove with alcohol every 7 days. 02/16/24  ·	Ciprofloxacin 500mg Tablet. Take 1 Tablet by mouth twice daily for 7 days. #14. 02/10/24  ·	Diltiazem  mg capsule: Take 1 capsule by mouth every day in the morning. #90. 12/12/23  ·	Ezetimibe 10mg tablet Take 1 tablet by mouth at bedtime. #90 02/20/24  ·	Furosemide 40mg Tablet. Take 1 Tablet by mouth daily. #90 02/02/24.  ·	Lactulose 10 g/15 mL solution. Take 15 mL by mouth daily. #30. 1/15/24  ·	Metoprolol succinate 100mg Tablet. Take 1 Tablet by mouth daily. #90 02/23/24  ·	Morphine sulf ER 15 mg tablet: Take 1 tablet in the morning and afternoon, and 2 tablets at bedtime. #30. 12/20/23.  ·	Olanzapine 10mg Tablet. Take 1 Tablet by mouth daily at bedtime. #30 02/07/24  ·	Omeprazole 40mg Capsule. Take 1 Capsule by mouth daily. #30 02/18/24  ·	Senna 8.6 mg Tablet. Take 2 tablets by mouth at bedtime. #180 02/17/2024  ·	Tamsulosin 0.4mg capsule. Take 1 Capsule by mouth daily. #90 02/17/24   ·	Vitamin D2 66332 Units. Take 1 Capsule weekly. #4. 02/05/24   Performed medication reconciliation and home medication list updated in outpatient medication review.     Medication list per pharmacy:   ·	Alprazolam 1mg tablet: Take 1 tablet by mouth every 6 hours as needed for anxiety. #30. 12/20/23  ·	Ambien 10mg tablet. Take 1 tablet by mouth at bedtime. #30. 02/08/2024  ·	Augmentin 875/125 mg tablet 1 Tablet twice a day. #14. 02/07/24  ·	Biktarvy 50/200/25 mg tablet. Take 1 Tablet by mouth once daily. #30 02/05/24  ·	Ciclopirox 8% Solution. Apply to Affected nail area. Remove with alcohol every 7 days. 02/16/24  ·	Ciprofloxacin 500mg tablet. Take 1 Tablet by mouth twice daily for 7 days. #14. 02/10/24  ·	Diltiazem  mg capsule. Take 1 capsule by mouth every day in the morning. #90. 12/12/23  ·	Ezetimibe 10mg tablet. Take 1 tablet by mouth at bedtime. #90 02/20/24  ·	Furosemide 40mg tablet. Take 1 Tablet by mouth daily. #90 02/02/24.  ·	Lactulose 10 g/15 mL solution. Take 15 mL by mouth daily. #30. 1/15/24  ·	Metoprolol succinate 100mg tablet. Take 1 Tablet by mouth daily. #90 02/23/24  ·	Morphine sulf ER 15 mg tablet: Take 1 tablet in the morning and afternoon, and 2 tablets at bedtime. #30. 12/20/23.  ·	Olanzapine 10mg Tablet. Take 1 Tablet by mouth daily at bedtime. #30 02/07/24  ·	Omeprazole 40mg Capsule. Take 1 Capsule by mouth daily. #30 02/18/24  ·	Senna 8.6 mg Tablet. Take 2 tablets by mouth at bedtime. #180 02/17/2024  ·	Tamsulosin 0.4mg capsule. Take 1 Capsule by mouth daily. #90 02/17/24   ·	Vitamin D2 33641 Units. Take 1 Capsule weekly. #4. 02/05/24

## 2024-02-25 NOTE — PROGRESS NOTE ADULT - ASSESSMENT
58M with h/o prostate CA treated with Radiation 4 years ago, subsequently had ureteral strictures requiring stents. Presently seen with b/l hydronephrosis despite current indwelling metal stents as well as recurrent pseudomonas UTI.     Problem/Plan - 1:  ·  Problem: Complicated UTI (urinary tract infection).   ·  Plan: For recent urine culture with pseudomonas, continue with zosyn  Pain control--> IV dilaudid prn for severe pain  B/l PCN placement by IR on Monday  Continue IV zosyn.  Urology to plan for metal stent removal this admission likely Wed or Thurs per Dr Bautista  ID consult     Problem/Plan - 2:  ·  Problem: Benign essential HTN.   ·  Plan: monitor BP and titrate BP meds.     Problem/Plan - 3:  ·  Problem: Hyperlipidemia, unspecified.   ·  Plan: On Zetia at home.     Problem/Plan - 4:  ·  Problem: HIV disease.   ·  Plan: HIV on HAART ( CD 4 608, VL undetectable on 2/12/24)  Continue Biktarvy.     Problem/Plan - 5:  ·  Problem: Constipation due to opioid therapy.   ·  Plan: continue lactulose and senna.

## 2024-02-26 ENCOUNTER — APPOINTMENT (OUTPATIENT)
Dept: MRI IMAGING | Facility: CLINIC | Age: 59
End: 2024-02-26

## 2024-02-26 LAB
-  AMIKACIN: SIGNIFICANT CHANGE UP
-  AZTREONAM: SIGNIFICANT CHANGE UP
-  CEFEPIME: SIGNIFICANT CHANGE UP
-  CEFTAZIDIME: SIGNIFICANT CHANGE UP
-  CIPROFLOXACIN: SIGNIFICANT CHANGE UP
-  IMIPENEM: SIGNIFICANT CHANGE UP
-  LEVOFLOXACIN: SIGNIFICANT CHANGE UP
-  MEROPENEM: SIGNIFICANT CHANGE UP
-  PIPERACILLIN/TAZOBACTAM: SIGNIFICANT CHANGE UP
ALBUMIN SERPL ELPH-MCNC: 3 G/DL — LOW (ref 3.3–5)
ALP SERPL-CCNC: 87 U/L — SIGNIFICANT CHANGE UP (ref 40–120)
ALT FLD-CCNC: 17 U/L — SIGNIFICANT CHANGE UP (ref 12–78)
ANION GAP SERPL CALC-SCNC: 1 MMOL/L — LOW (ref 5–17)
AST SERPL-CCNC: 11 U/L — LOW (ref 15–37)
BILIRUB SERPL-MCNC: 0.3 MG/DL — SIGNIFICANT CHANGE UP (ref 0.2–1.2)
BUN SERPL-MCNC: 16 MG/DL — SIGNIFICANT CHANGE UP (ref 7–23)
CALCIUM SERPL-MCNC: 9.2 MG/DL — SIGNIFICANT CHANGE UP (ref 8.5–10.1)
CHLORIDE SERPL-SCNC: 111 MMOL/L — HIGH (ref 96–108)
CO2 SERPL-SCNC: 30 MMOL/L — SIGNIFICANT CHANGE UP (ref 22–31)
CREAT SERPL-MCNC: 1.06 MG/DL — SIGNIFICANT CHANGE UP (ref 0.5–1.3)
CULTURE RESULTS: ABNORMAL
EGFR: 82 ML/MIN/1.73M2 — SIGNIFICANT CHANGE UP
GLUCOSE SERPL-MCNC: 88 MG/DL — SIGNIFICANT CHANGE UP (ref 70–99)
HCT VFR BLD CALC: 38 % — LOW (ref 39–50)
HGB BLD-MCNC: 12.2 G/DL — LOW (ref 13–17)
MAGNESIUM SERPL-MCNC: 1.9 MG/DL — SIGNIFICANT CHANGE UP (ref 1.6–2.6)
MCHC RBC-ENTMCNC: 27.9 PG — SIGNIFICANT CHANGE UP (ref 27–34)
MCHC RBC-ENTMCNC: 32.1 G/DL — SIGNIFICANT CHANGE UP (ref 32–36)
MCV RBC AUTO: 86.8 FL — SIGNIFICANT CHANGE UP (ref 80–100)
METHOD TYPE: SIGNIFICANT CHANGE UP
NRBC # BLD: 0 /100 WBCS — SIGNIFICANT CHANGE UP (ref 0–0)
ORGANISM # SPEC MICROSCOPIC CNT: ABNORMAL
ORGANISM # SPEC MICROSCOPIC CNT: SIGNIFICANT CHANGE UP
PHOSPHATE SERPL-MCNC: 3 MG/DL — SIGNIFICANT CHANGE UP (ref 2.5–4.5)
PLATELET # BLD AUTO: 337 K/UL — SIGNIFICANT CHANGE UP (ref 150–400)
POTASSIUM SERPL-MCNC: 3.6 MMOL/L — SIGNIFICANT CHANGE UP (ref 3.5–5.3)
POTASSIUM SERPL-SCNC: 3.6 MMOL/L — SIGNIFICANT CHANGE UP (ref 3.5–5.3)
PROT SERPL-MCNC: 7.2 GM/DL — SIGNIFICANT CHANGE UP (ref 6–8.3)
RBC # BLD: 4.38 M/UL — SIGNIFICANT CHANGE UP (ref 4.2–5.8)
RBC # FLD: 14.1 % — SIGNIFICANT CHANGE UP (ref 10.3–14.5)
SODIUM SERPL-SCNC: 142 MMOL/L — SIGNIFICANT CHANGE UP (ref 135–145)
SPECIMEN SOURCE: SIGNIFICANT CHANGE UP
WBC # BLD: 5.52 K/UL — SIGNIFICANT CHANGE UP (ref 3.8–10.5)
WBC # FLD AUTO: 5.52 K/UL — SIGNIFICANT CHANGE UP (ref 3.8–10.5)

## 2024-02-26 PROCEDURE — 99231 SBSQ HOSP IP/OBS SF/LOW 25: CPT

## 2024-02-26 PROCEDURE — 99232 SBSQ HOSP IP/OBS MODERATE 35: CPT

## 2024-02-26 PROCEDURE — 36573 INSJ PICC RS&I 5 YR+: CPT

## 2024-02-26 PROCEDURE — 76937 US GUIDE VASCULAR ACCESS: CPT | Mod: 26

## 2024-02-26 PROCEDURE — 36556 INSERT NON-TUNNEL CV CATH: CPT

## 2024-02-26 RX ADMIN — HYDROMORPHONE HYDROCHLORIDE 1 MILLIGRAM(S): 2 INJECTION INTRAMUSCULAR; INTRAVENOUS; SUBCUTANEOUS at 13:25

## 2024-02-26 RX ADMIN — Medication 240 MILLIGRAM(S): at 10:45

## 2024-02-26 RX ADMIN — HYDROMORPHONE HYDROCHLORIDE 1 MILLIGRAM(S): 2 INJECTION INTRAMUSCULAR; INTRAVENOUS; SUBCUTANEOUS at 22:26

## 2024-02-26 RX ADMIN — BICTEGRAVIR SODIUM, EMTRICITABINE, AND TENOFOVIR ALAFENAMIDE FUMARATE 1 TABLET(S): 30; 120; 15 TABLET ORAL at 10:45

## 2024-02-26 RX ADMIN — PIPERACILLIN AND TAZOBACTAM 25 GRAM(S): 4; .5 INJECTION, POWDER, LYOPHILIZED, FOR SOLUTION INTRAVENOUS at 01:03

## 2024-02-26 RX ADMIN — PIPERACILLIN AND TAZOBACTAM 25 GRAM(S): 4; .5 INJECTION, POWDER, LYOPHILIZED, FOR SOLUTION INTRAVENOUS at 18:25

## 2024-02-26 RX ADMIN — HYDROMORPHONE HYDROCHLORIDE 1 MILLIGRAM(S): 2 INJECTION INTRAMUSCULAR; INTRAVENOUS; SUBCUTANEOUS at 21:26

## 2024-02-26 RX ADMIN — HYDROMORPHONE HYDROCHLORIDE 1 MILLIGRAM(S): 2 INJECTION INTRAMUSCULAR; INTRAVENOUS; SUBCUTANEOUS at 08:40

## 2024-02-26 RX ADMIN — HYDROMORPHONE HYDROCHLORIDE 1 MILLIGRAM(S): 2 INJECTION INTRAMUSCULAR; INTRAVENOUS; SUBCUTANEOUS at 12:26

## 2024-02-26 RX ADMIN — HYDROMORPHONE HYDROCHLORIDE 1 MILLIGRAM(S): 2 INJECTION INTRAMUSCULAR; INTRAVENOUS; SUBCUTANEOUS at 07:44

## 2024-02-26 RX ADMIN — PIPERACILLIN AND TAZOBACTAM 25 GRAM(S): 4; .5 INJECTION, POWDER, LYOPHILIZED, FOR SOLUTION INTRAVENOUS at 10:42

## 2024-02-26 RX ADMIN — HYDROMORPHONE HYDROCHLORIDE 1 MILLIGRAM(S): 2 INJECTION INTRAMUSCULAR; INTRAVENOUS; SUBCUTANEOUS at 17:18

## 2024-02-26 RX ADMIN — ZOLPIDEM TARTRATE 5 MILLIGRAM(S): 10 TABLET ORAL at 21:49

## 2024-02-26 NOTE — PROGRESS NOTE ADULT - SUBJECTIVE AND OBJECTIVE BOX
HALLEYCEZARCARL  MRN-[id number]    Follow Up:  HIV, recurrent UTI    Interval History: the pt was seen and examined earlier, not in acute distress, awaiting for his urological procedure, pt's family is present. Pt is afebrile, RA, no wbc, complains of having loose stool in his colostomy bag, complains of dysuria - somewhat better.     PAST MEDICAL & SURGICAL HISTORY:  HTN (hypertension)      HLD (hyperlipidemia)      HIV infection      Anxiety      BPH (benign prostatic hyperplasia)      History of anal cancer      Anal lesion      S/P colostomy      History of gastroscopy      Ureteral stent present          ROS:    [ ] Unobtainable because:  [x ] All other systems negative    Constitutional: no fever, no chills  Head: no trauma  Eyes: no vision changes, no eye pain  ENT:  no sore throat, no rhinorrhea  Cardiovascular:  no chest pain, no palpitation  Respiratory:  no SOB, no cough  GI:  no abd pain, no vomiting, loose stool   urinary: + dysuria, no hematuria, no flank pain  musculoskeletal:  no joint pain, no joint swelling  skin:  no rash  neurology:  no headache, no seizure, no change in mental status  psych: no anxiety, no depression         Allergies  No Known Allergies        ANTIMICROBIALS:  bictegravir 50 mG/emtricitabine 200 mG/tenofovir alafenamide 25 mG (BIKTARVY) 1 daily  piperacillin/tazobactam IVPB.. 3.375 every 8 hours      OTHER MEDS:  acetaminophen     Tablet .. 650 milliGRAM(s) Oral every 6 hours PRN  ALPRAZolam 1 milliGRAM(s) Oral every 6 hours PRN  diltiazem    milliGRAM(s) Oral daily  HYDROmorphone  Injectable 1 milliGRAM(s) IV Push every 4 hours PRN  lactulose Syrup 10 Gram(s) Oral daily  metoprolol succinate  milliGRAM(s) Oral daily  ondansetron Injectable 4 milliGRAM(s) IV Push every 8 hours PRN  oxyCODONE    IR 10 milliGRAM(s) Oral every 6 hours PRN  senna 2 Tablet(s) Oral at bedtime  zolpidem 5 milliGRAM(s) Oral at bedtime PRN  zolpidem 5 milliGRAM(s) Oral at bedtime PRN      Vital Signs Last 24 Hrs  T(C): 36.7 (26 Feb 2024 10:23), Max: 36.9 (26 Feb 2024 04:55)  T(F): 98 (26 Feb 2024 10:23), Max: 98.4 (26 Feb 2024 04:55)  HR: 62 (26 Feb 2024 10:23) (57 - 62)  BP: 159/97 (26 Feb 2024 10:23) (129/90 - 159/97)  BP(mean): --  RR: 18 (26 Feb 2024 10:23) (18 - 18)  SpO2: 98% (26 Feb 2024 10:23) (96% - 99%)    Parameters below as of 25 Feb 2024 23:06  Patient On (Oxygen Delivery Method): room air        Physical Exam:  Constitutional: non-toxic, no distress, RA  HEAD/EYES: anicteric, no conjunctival injection  ENT:  supple, no thrush  Cardiovascular:   normal S1, S2, no murmur, no edema  Respiratory:  clear BS bilaterally, no wheezes, no rales  GI:  soft, non-tender, normal bowel sounds, + colostomy with small amount of stool in the bag   :  no davis, no CVA tenderness  Musculoskeletal:  no synovitis, normal ROM  Neurologic: awake and alert, normal strength, no focal findings  Skin:  no rash, no erythema, no phlebitis  Heme/Onc: no lymphadenopathy   Psychiatric:  awake, alert, appropriate mood    WBC Count: 5.52 K/uL (02-26 @ 06:40)  WBC Count: 5.55 K/uL (02-23 @ 06:34)  WBC Count: 9.07 K/uL (02-22 @ 09:20)                            12.2   5.52  )-----------( 337      ( 26 Feb 2024 06:40 )             38.0       02-26    142  |  111<H>  |  16  ----------------------------<  88  3.6   |  30  |  1.06    Ca    9.2      26 Feb 2024 06:40  Phos  3.0     02-26  Mg     1.9     02-26    TPro  7.2  /  Alb  3.0<L>  /  TBili  0.3  /  DBili  x   /  AST  11<L>  /  ALT  17  /  AlkPhos  87  02-26      Urinalysis Basic - ( 26 Feb 2024 06:40 )    Color: x / Appearance: x / SG: x / pH: x  Gluc: 88 mg/dL / Ketone: x  / Bili: x / Urobili: x   Blood: x / Protein: x / Nitrite: x   Leuk Esterase: x / RBC: x / WBC x   Sq Epi: x / Non Sq Epi: x / Bacteria: x        Creatinine Trend: 1.06<--, 1.08<--, 1.06<--, 1.15<--, 1.13<--      MICROBIOLOGY:  v  .Blood Blood-Peripheral  02-22-24   No growth at 72 Hours  --  Pseudomonas aeruginosa      .Urine  02-12-24   >100,000 CFU/ml Pseudomonas aeruginosa  --  Pseudomonas aeruginosa        HIV-1 RNA Quantitative, Viral Load Log: NOT DET. lg /mL (02-12-24 @ 09:46)  HIV-1 RNA Quantitative, Viral Load Log: NOT DET. lg /mL (12-11-23 @ 21:15)  HIV-1 RNA Quantitative, Viral Load Log: NOT DET. lg /mL (08-10-23 @ 14:55)  HIV-1 RNA Quantitative, Viral Load Log: NOT DET. lg /mL (07-23-23 @ 07:36)  HIV-1 RNA Quantitative, Viral Load Log: NOT DET. lg /mL (05-11-23 @ 15:18)      SARS-CoV-2: NotDetec (06 Oct 2023 02:40)  SARS-CoV-2: NotDetec (13 Sep 2023 21:20)    RADIOLOGY:

## 2024-02-26 NOTE — PROCEDURE NOTE - PROCEDURE FINDINGS AND DETAILS
Successful placement of bilateral 8.5 Fr nephrostomy tubes. Yellow, cloudy/murky fluid was obtained from the left kidney and clear yellow fluid from the right.

## 2024-02-26 NOTE — PROGRESS NOTE ADULT - ASSESSMENT
58 years old male with h/o HTN, HLD, HIV on HAART ( CD 4 608, VL undetectable on 2/12/24), h/o jared Ca (s/p chemo, radio therapy, surgery, colostomy 03/2022), prostate Ca s/p radiotherapy, ureteral stricture s/p stent ( most recent exchange with Dr Mathew on 12/13/2023), recent admission with UTI ( urine culture with pseudomonas)  present to ED with complain of dysuria, suprapubic pain. Patient was admitted in 01/2024 for pseudomonas UTI, treated with IV cefepime x 7 days with improvement. 5 days after discharge, patient noted to have dysuria again. Seen by urology, intially started on Augmentin which was later switched to ciprofloxacin x 7 days on 2/12/24 after seeing ID in clinic  CT abd/pelvis with bilateral ureteral stents. Stable bilateral moderate to severe hydronephrosis without obstructing focus. 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 He reports getting better last admission when he received antibiotics.   Would treat with either cefepime or Zosyn. and since Zosyn was started already can continue  follow repeat cultures   although the metal stent he has may have a lower risk of infection any foreign material can become infected and we need to be cognizant of this. Chronic suppressive therapy would not work in this case as the quinolones are already resistant which would be the only oral option.   continue his ARVs, he is well controlled     2/26: no fever, RA, no wbc, Cr and LFTs ok, BCs NGTD, UC grew pseudomonas, Zosyn IV continued, pt is for b/l PCN placement today. HIV medication continued.     Suggestions:  - continue Zosyn IV  - b/l PCN placement possibly today  - follow all culture data  - pain control  - monitor pt's stools  - continue Biktarvy       Discussed with the pt and his family at the bedside

## 2024-02-26 NOTE — CONSULT NOTE ADULT - SUBJECTIVE AND OBJECTIVE BOX
Interventional Radiology    Evaluate for Procedure: b/l PCN placement    HPI: 58y Male with h/o prostate CA treated with Radiation 4 years ago, subsequently had ureteral strictures requiring stents. Presently seen with b/l hydronephrosis despite current indwelling metal stents as well as recurrent pseudomonas UTI. IR consulted for b/l PCN placement. Patient also planned for stent removal this admission.     Allergies: No Known Allergies    Medications (Abx/Cardiac/Anticoagulation/Blood Products)    bictegravir 50 mG/emtricitabine 200 mG/tenofovir alafenamide 25 mG (BIKTARVY): 1 Tablet(s) Oral (02-26 @ 10:45)  diltiazem   CD: 240 milliGRAM(s) Oral (02-26 @ 10:45)  enoxaparin Injectable: 40 milliGRAM(s) SubCutaneous (02-24 @ 21:34)  metoprolol succinate ER: 100 milliGRAM(s) Oral (02-25 @ 05:32)  piperacillin/tazobactam IVPB..: 25 mL/Hr IV Intermittent (02-26 @ 10:42)    Data:    T(C): 36.7  HR: 62  BP: 159/97  RR: 18  SpO2: 98%    -WBC 5.52 / HgB 12.2 / Hct 38.0 / Plt 337  -Na 142 / Cl 111 / BUN 16 / Glucose 88  -K 3.6 / CO2 30 / Cr 1.06  -ALT 17 / Alk Phos 87 / T.Bili 0.3  -INR 1.03 / PTT 33.8    Radiology: Reviewed    Assessment/Plan:   -58y Male with h/o prostate CA treated with Radiation 4 years ago, subsequently had ureteral strictures requiring stents. Presently seen with b/l hydronephrosis despite current indwelling metal stents as well as recurrent pseudomonas UTI. IR consulted for b/l PCN placement. Patient also planned for stent removal this admission.       - case and imaging reviewed with Dr. Brand  - Plan for bilateral percutaneous nephrostomy tube placement today  - please place IR procedure order under Cathie  - STAT labs in AM (cbc,coags, bmp, T&S)  - lovenox held x 2 days.   - NPO  - d/w primary team
  Chief Complaint:  Patient is a 58y old  Male who presents with a chief complaint of dysuria    HPI: 57 Y/O male c/o pain with urination felt at penis tip.  This has been chronic issue, pt says pain is worsening, not helped by morphine rx.  Pt with a PMHx of Anal ca, prostate ca s/p radiation therapy, HTN, HLD, with a PSHx of Suarez's, stent placement and most recent stent exchange with Dr. Mathew on 2023, had recent admission here for UCx showing pseudomonas.  UTI and renal function improved, per ID and urology nothing to do for UTI as metal stents pt has are rarely nidus for infection.  Pt'd dc'd 24 with plan for urology OP f/u.  Did not see urology but saw ID on , Ucx then showed pseudomonas, ID placed pt on cipro which he just completed 3 days ago.  Pt denies fevers, chills, nausea or vomiting.  He wears diaper for urinary incontinence, says he urinates frequently with small dribbling output.  Bedside bladder scan 35ml.        PMH/PSH:PAST MEDICAL & SURGICAL HISTORY:  HTN (hypertension)      HLD (hyperlipidemia)      HIV infection      Anxiety      BPH (benign prostatic hyperplasia)      History of anal cancer      Anal lesion      S/P colostomy      History of gastroscopy      Ureteral stent present          Allergies:  No Known Allergies      Medications:      REVIEW OF SYSTEMS:  All other review of systems is negative unless indicated above.    Relevant Family History:   FAMILY HISTORY:  FH: prostate cancer    FH: breast cancer        Relevant Social History:  Denies ETOH or tobacco history    Physical Exam:    Vital Signs:  Vital Signs Last 24 Hrs  T(C): 37.1 (2024 11:21), Max: 37.3 (2024 08:33)  T(F): 98.8 (2024 11:21), Max: 99.1 (2024 08:33)  HR: 74 (2024 11:21) (74 - 98)  BP: 107/69 (2024 11:21) (107/69 - 108/78)  BP(mean): 81 (2024 11:21) (81 - 81)  RR: 18 (2024 11:21) (18 - 20)  SpO2: 96% (2024 11:21) (96% - 96%)    Parameters below as of 2024 11:21  Patient On (Oxygen Delivery Method): room air      Daily Height in cm: 180.34 (2024 08:33)    Daily     Constitutional: WDWN resting comfortably in bed; NAD  Respiratory: Normal work of breathing  Cardiac: RRR  Gastrointestinal: soft, NT/ND; no rebound or guarding; ostomy pink and viable with stool in bag  : uncircumcised penis, normal exam, no trauma, sign of infection  Skin: warm, dry and intact; no rashes, wounds, or scars  Neurologic: AAOx3;     Laboratory:                          12.1   9.07  )-----------( 355      ( 2024 09:20 )             37.7         Urinalysis Basic - ( 2024 09:20 )    Color: Yellow / Appearance: Turbid / S.017 / pH: x  Gluc: 117 mg/dL / Ketone: Negative mg/dL  / Bili: Negative / Urobili: 0.2 mg/dL   Blood: x / Protein: 300 mg/dL / Nitrite: Positive   Leuk Esterase: Large / RBC: Too Numerous to count /HPF / WBC Too Numerous to count /HPF   Sq Epi: x / Non Sq Epi: x / Bacteria: Many /HPF          Imaging:    < from: CT Abdomen and Pelvis No Cont (24 @ 11:30) >      INTERPRETATION:  CLINICAL INFORMATION: Renal colic    COMPARISON: CT abdomen and pelvis 2024    CONTRAST/COMPLICATIONS:  IV Contrast: NONE  Oral Contrast: NONE  Complications: None reported at time of study completion    PROCEDURE:  CT of the Abdomen and Pelvis was performed.  Sagittal and coronal reformats were performed.    FINDINGS:  LOWER CHEST: Pleural-based lingular consolidation. Correlate for infection    LIVER: Within normal limits.  BILE DUCTS: Normal caliber.  GALLBLADDER: Within normal limits.  SPLEEN: Within normal limits.  PANCREAS: Within normal limits.  ADRENALS: Within normal limits.  KIDNEYS/URETERS: Bilateral ureteral stents reidentified in situ. No   change in moderate to severe bilateral hydronephrosis. No obstructing   calculi. Bilateral periureteral stranding similar to prior May BE   reactive or rectum present ureter rightest    BLADDER: Mild perivesicular stranding may be reactive or reflect cystitis.  REPRODUCTIVE ORGANS: Normal prostate    BOWEL: No bowel obstruction. Left lower quadrant ostomy. Appendix no   appendicitis  PERITONEUM: No ascites.  VESSELS: Nonaneurysmal. IVC filter in situ  RETROPERITONEUM/LYMPH NODES: Mildly prominent retroperitoneal lymph nodes   similar to prior.  ABDOMINAL WALL: Postoperative changes.  BONES: No acute or aggressive pathology.    IMPRESSION:  Bilateral ureteral stents.  Stable bilateral moderate to severe hydronephrosis without obstructing   focus.  Bilateral periureteral and perivesicular stranding may be reactive and/or   reflect UTI. Correlate with urinalysis  Lingular consolidation. Correlate for infection. Follow with serial chest   radiographs        --- End of Report ---          < end of copied text >  
[facility]  INFECTIOUS DISEASES   [address] [place] [address]  Tel: [phone]     Fax: [phone]  ==============================================================================  DO Olman Fernandez MD Alexandra Gutman, NP   ==============================================================================    HALLEYCEZARCARL  MRN-[id number]  Male  58y ([birth year])        Patient is a 58y old  Male who presents with a chief complaint of complicated UTI due to pseudomonas (22 Feb 2024 13:51)      HPI:  58 years old male with h/o HTN, HLD, HIV on HAART ( CD 4 608, VL undetectable on 2/12/24), h/o jared Ca (s/p chemo, radio therapy, surgery, colostomy 03/2022), prostate Ca s/p radiotherapy, ureteral stricture s/p stent ( most recent exchange with Dr Mathew on 12/13/2023), recent admission with UTI ( urine culture with pseudomonas)  present to ED with complain of dysuria, suprapubic pain. Patient was admitted in 01/2024 for pseudomonas UTI, treated with IV cefepime x 7 days with improvement. 5 days after discharge, patient noted to have dysuria again. Seen by urology, intially started on Augmentin which was later switched to ciprofloxacin x 7 days on 2/12/24 after seeing ID in clinic. Patient also received Bicillin L-A IM in ID clinic as well. No fever or chills.   Hemodynamically stable, afebrile, sat well at RA. No leukocytosis, plt 355, K 4.1, Cr 1.15, lactate 2.3. UA dirty. CT abd/pelvis with bilateral ureteral stents. Stable bilateral moderate to severe hydronephrosis without obstructing focus. Bilateral periureteral and perivesicular stranding may be reactive and/or reflect UTI. Lingular consolidation      SH: no toxic habits  FH: HTN (22 Feb 2024 13:51)      ID consulted for workup and antibiotic management     PAST MEDICAL & SURGICAL HISTORY:  HTN (hypertension)      HLD (hyperlipidemia)      HIV infection      Anxiety      BPH (benign prostatic hyperplasia)      History of anal cancer      Anal lesion      S/P colostomy      History of gastroscopy      Ureteral stent present    Allergies  No Known Allergies        ANTIMICROBIALS:  piperacillin/tazobactam IVPB.. 3.375 every 8 hours      MEDICATIONS  (STANDING):    piperacillin/tazobactam IVPB..   25 mL/Hr IV Intermittent (02-22-24 @ 15:15)    piperacillin/tazobactam IVPB...   200 mL/Hr IV Intermittent (02-22-24 @ 11:04)        OTHER MEDS: MEDICATIONS  (STANDING):  acetaminophen     Tablet .. 650 every 6 hours PRN  ALPRAZolam 1 every 6 hours PRN  diltiazem    daily  enoxaparin Injectable 40 every 24 hours  HYDROmorphone  Injectable 1 every 4 hours PRN  lactulose Syrup 10 daily  metoprolol succinate  daily  ondansetron Injectable 4 every 8 hours PRN  oxyCODONE    IR 10 every 6 hours PRN  senna 2 at bedtime  zolpidem 5 at bedtime PRN  zolpidem 5 at bedtime PRN      SOCIAL HISTORY:     Smoking Cigarettes [ ]Active [ ] Former [x ]Denies   ETOH [ x]denies [ ]Former [ ]Current Use denies   Drug Use [x ]Never [ ] Former [ ] Active     FAMILY HISTORY:  FH: prostate cancer    FH: breast cancer      REVIEW OF SYSTEMS  [  ] ROS unobtainable because:    [ x ] All other systems negative except as noted below:	    Constitutional:  [ ] fever [ ] chills  [ ] weight loss  [ ] weakness  Skin:  [ ] rash [ ] phlebitis	  Eyes: [ ] icterus [ ] pain  [ ] discharge	  ENMT: [ ] sore throat  [ ] thrush [ ] ulcers [ ] exudates  Respiratory: [ ] dyspnea [ ] hemoptysis [ ] cough [ ] sputum	  Cardiovascular:  [ ] chest pain [ ] palpitations [ ] edema	  Gastrointestinal:  [ ] nausea [ ] vomiting [ ] diarrhea [ ] constipation [ ] pain	  Genitourinary:  [ x] dysuria [ ] frequency [ ] hematuria [ ] discharge [ ] flank pain  [ ] incontinence  Musculoskeletal:  [ ] myalgias [ ] arthralgias [ ] arthritis  [ ] back pain  Neurological:  [ ] headache [ ] seizures  [ ] confusion/altered mental status  Psychiatric:  [ ] anxiety [ ] depression	  Hematology/Lymphatics:  [ ] lymphadenopathy  Endocrine:  [ ] adrenal [ ] thyroid  Allergic/Immunologic:	 [ ] transplant [ ] seasonal    Vital Signs Last 24 Hrs  T(F): 98 (02-22-24 @ 18:05), Max: 99.1 (02-22-24 @ 08:33)    Vital Signs Last 24 Hrs  HR: 68 (02-22-24 @ 18:05) (68 - 98)  BP: 153/82 (02-22-24 @ 18:05) (105/63 - 153/82)  RR: 18 (02-22-24 @ 18:05)  SpO2: 98% (02-22-24 @ 18:05) (96% - 98%)  Wt(kg): --    PHYSICAL EXAM:  Constitutional: non-toxic, no distress  HEAD/EYES: anicteric, no conjunctival injection  ENT:  supple, no thrush  Cardiovascular:   normal S1, S2, no murmur, no edema  Respiratory:  clear BS bilaterally, no wheezes, no rales  GI:  soft, non-tender, normal bowel sounds, + colostomy with work in bag   :  no davis, no CVA tenderness  Musculoskeletal:  no synovitis, normal ROM  Neurologic: awake and alert, normal strength, no focal findings  Skin:  no rash, no erythema, no phlebitis  Heme/Onc: no lymphadenopathy   Psychiatric:  awake, alert, appropriate mood    WBC Count: 9.07 K/uL (02-22 @ 09:20)      Auto Neutrophil %: 75.1 % (02-22-24 @ 09:20)  Auto Neutrophil #: 6.80 K/uL (02-22-24 @ 09:20)                            12.1   9.07  )-----------( 355      ( 22 Feb 2024 09:20 )             37.7       02-22    143  |  112<H>  |  13  ----------------------------<  117<H>  4.1   |  27  |  1.15    Ca    9.1      22 Feb 2024 09:20    TPro  7.4  /  Alb  3.2<L>  /  TBili  0.3  /  DBili  x   /  AST  17  /  ALT  13  /  AlkPhos  96  02-22      Creatinine Trend: 1.15<--, 1.13<--, 1.04<--    Lactate, Blood: 1.7 mmol/L (02-22-24 @ 15:15)  Lactate, Blood: 2.3 mmol/L (02-22-24 @ 09:20)      MICROBIOLOGY:  .Urine  02-12-24   >100,000 CFU/ml Pseudomonas aeruginosa  --  Pseudomonas aeruginosa      SARS-CoV-2: NotDetec (06 Oct 2023 02:40)  SARS-CoV-2: NotDetec (13 Sep 2023 21:20)    RADIOLOGY:    I have personally reviewed the above imaging

## 2024-02-26 NOTE — PROGRESS NOTE ADULT - SUBJECTIVE AND OBJECTIVE BOX
Patient is a 58y old  Male who presents with a chief complaint of complicated UTI due to pseudomonas      INTERVAL HPI/OVERNIGHT EVENTS:   Patient seen and examined at bedside,   anxious about procedure,   notes some dysuria and abdominal pain.  No other complaints.    For bilateral PCN with urology this afternoon   Plan to have metal stent removal later this week.       MEDICATIONS  (STANDING):  bictegravir 50 mG/emtricitabine 200 mG/tenofovir alafenamide 25 mG (BIKTARVY) 1 Tablet(s) Oral daily  diltiazem    milliGRAM(s) Oral daily  enoxaparin Injectable 40 milliGRAM(s) SubCutaneous every 24 hours  lactulose Syrup 10 Gram(s) Oral daily  metoprolol succinate  milliGRAM(s) Oral daily  piperacillin/tazobactam IVPB.. 3.375 Gram(s) IV Intermittent every 8 hours  potassium chloride    Tablet ER 40 milliEquivalent(s) Oral once  senna 2 Tablet(s) Oral at bedtime    MEDICATIONS  (PRN):  acetaminophen     Tablet .. 650 milliGRAM(s) Oral every 6 hours PRN Temp greater or equal to 38C (100.4F), Mild Pain (1 - 3)  ALPRAZolam 1 milliGRAM(s) Oral every 6 hours PRN for anxiety  HYDROmorphone  Injectable 1 milliGRAM(s) IV Push every 4 hours PRN Severe Pain (7 - 10)  ondansetron Injectable 4 milliGRAM(s) IV Push every 8 hours PRN Nausea and/or Vomiting  oxyCODONE    IR 10 milliGRAM(s) Oral every 6 hours PRN Moderate Pain (4 - 6)  zolpidem 5 milliGRAM(s) Oral at bedtime PRN Insomnia  zolpidem 5 milliGRAM(s) Oral at bedtime PRN Insomnia    Allergies    No Known Allergies    Intolerances      REVIEW OF SYSTEMS:  All other systems reviewed and are negative    Physical exam:  General: patient in no acute distress, resting comfortably  Head:  Atraumatic, Normocephalic  Eyes: EOMI, PERRLA, clear sclera  Neck: Supple, thyroid nontender, non enlarged  Cardio: S1/S2 +ve, regular rate and rhythm, no M/G/R  Resp: clear to ausculation bilaterally, no rales or wheezes    GI: abdomen soft, nontender, non distended, no guarding, BS +ve x 4  : no CVA tenderness  Ext: no significant pedal edema  Neuro: CN 2-12 intact, no significant motor or sensory deficits.  Skin: No rashes or lesions     Recent Vitals  T(C): 36.7 (02-26-24 @ 10:23), Max: 36.9 (02-26-24 @ 04:55)  HR: 62 (02-26-24 @ 10:23) (57 - 62)  BP: 159/97 (02-26-24 @ 10:23) (129/90 - 159/97)  RR: 18 (02-26-24 @ 10:23) (18 - 18)  SpO2: 98% (02-26-24 @ 10:23) (96% - 99%)                        12.2   5.52  )-----------( 337      ( 26 Feb 2024 06:40 )             38.0     02-26    142  |  111<H>  |  16  ----------------------------<  88  3.6   |  30  |  1.06    Ca    9.2      26 Feb 2024 06:40  Phos  3.0     02-26  Mg     1.9     02-26    TPro  7.2  /  Alb  3.0<L>  /  TBili  0.3  /  DBili  x   /  AST  11<L>  /  ALT  17  /  AlkPhos  87  02-26      LIVER FUNCTIONS - ( 26 Feb 2024 06:40 )  Alb: 3.0 g/dL / Pro: 7.2 gm/dL / ALK PHOS: 87 U/L / ALT: 17 U/L / AST: 11 U/L / GGT: x           Urinalysis Basic - ( 26 Feb 2024 06:40 )    Color: x / Appearance: x / SG: x / pH: x  Gluc: 88 mg/dL / Ketone: x  / Bili: x / Urobili: x   Blood: x / Protein: x / Nitrite: x   Leuk Esterase: x / RBC: x / WBC x   Sq Epi: x / Non Sq Epi: x / Bacteria: x          acetaminophen     Tablet .. 650 milliGRAM(s) Oral every 6 hours PRN  ALPRAZolam 1 milliGRAM(s) Oral every 6 hours PRN  bictegravir 50 mG/emtricitabine 200 mG/tenofovir alafenamide 25 mG (BIKTARVY) 1 Tablet(s) Oral daily  diltiazem    milliGRAM(s) Oral daily  HYDROmorphone  Injectable 1 milliGRAM(s) IV Push every 4 hours PRN  lactulose Syrup 10 Gram(s) Oral daily  metoprolol succinate  milliGRAM(s) Oral daily  ondansetron Injectable 4 milliGRAM(s) IV Push every 8 hours PRN  oxyCODONE    IR 10 milliGRAM(s) Oral every 6 hours PRN  piperacillin/tazobactam IVPB.. 3.375 Gram(s) IV Intermittent every 8 hours  senna 2 Tablet(s) Oral at bedtime  zolpidem 5 milliGRAM(s) Oral at bedtime PRN  zolpidem 5 milliGRAM(s) Oral at bedtime PRN    Home Medications:  ALPRAZolam 1 mg oral tablet: 1 tab(s) orally every 6 hours as needed for  anxiety (25 Feb 2024 16:29)  Ambien 10 mg oral tablet: 1 tab(s) orally once a day (at bedtime) (25 Feb 2024 16:29)  bictegravir/emtricitabine/tenofovir 50 mg-200 mg-25 mg oral tablet: 1 tab(s) orally once a day (25 Feb 2024 16:29)  cholecalciferol 1250 mcg (50,000 intl units) oral capsule: 1 cap(s) orally once a week on Sunday (25 Feb 2024 16:29)  ciclopirox 8% topical solution: Apply topically to affected area . Remove with alcohol every 7 days. (25 Feb 2024 16:28)  DilTIAZem (Eqv-Cardizem CD) 240 mg/24 hours oral capsule, extended release: 1 cap(s) orally once a day (25 Feb 2024 16:29)  ezetimibe 10 mg oral tablet: 1 tab(s) orally once a day (25 Feb 2024 16:29)  furosemide 40 mg oral tablet: 1 tab(s) orally once a day (25 Feb 2024 16:27)  lactulose 10 g/15 mL oral syrup: 15 milliliter(s) orally once a day (25 Feb 2024 16:29)  metoprolol succinate 100 mg oral tablet, extended release: 1 tab(s) orally once a day (25 Feb 2024 16:29)  morphine 15 mg oral tablet: 1 tab(s) orally every 6 hours as needed for  severe pain (25 Feb 2024 16:29)  OLANZapine 10 mg oral tablet: 1 tab(s) orally once a day (at bedtime) (25 Feb 2024 16:27)  omeprazole 40 mg oral delayed release capsule: 1 cap(s) orally once a day (25 Feb 2024 16:27)  senna (sennosides) 8.6 mg oral tablet: 2 tab(s) orally once a day (at bedtime) (25 Feb 2024 16:29)  tamsulosin 0.4 mg oral capsule: 1 cap(s) orally once a day (25 Feb 2024 16:27)

## 2024-02-26 NOTE — PROCEDURE NOTE - PLAN
Please forward flush once a day with 5cc Please forward flush once a day with 5cc.  Can resume blood thinners after 48hours

## 2024-02-26 NOTE — PROGRESS NOTE ADULT - ASSESSMENT
58M with h/o prostate CA treated with Radiation 4 years ago, subsequently had ureteral strictures requiring stents. Presently seen with b/l hydronephrosis despite current indwelling metal stents as well as recurrent pseudomonas UTI.     Problem/Plan - 1:  ·  Problem: Complicated UTI (urinary tract infection).   Plan: For recent urine culture with pseudomonas, continue with zosyn  Pain control--> IV dilaudid prn for severe pain  B/l PCN placement by IR today  Continue IV zosyn.  Urology to plan for metal stent removal this admission likely Wed or Thurs per Dr Bautista  ID on board     Problem/Plan - 2:  ·  Problem: Benign essential HTN.   ·  Plan: monitor BP and titrate BP meds.     Problem/Plan - 3:  ·  Problem: Hyperlipidemia, unspecified.   ·  Plan: On Zetia at home.     Problem/Plan - 4:  ·  Problem: HIV disease.   ·  Plan: HIV on HAART ( CD 4 608, VL undetectable on 2/12/24)  Continue Biktarvy.     Problem/Plan - 5:  ·  Problem: Constipation due to opioid therapy.   ·  Plan: continue lactulose and senna.

## 2024-02-26 NOTE — PROGRESS NOTE ADULT - SUBJECTIVE AND OBJECTIVE BOX
Patient seen and examined bedside  S/p IR procedure  feels well, no complaints offered    T(F): 98 (02-26-24 @ 18:23), Max: 98.4 (02-26-24 @ 04:55)  HR: 83 (02-26-24 @ 18:23) (57 - 83)  BP: 153/99 (02-26-24 @ 18:23) (129/90 - 159/97)  RR: 18 (02-26-24 @ 18:23) (18 - 18)  SpO2: 98% (02-26-24 @ 18:23) (96% - 99%)  Wt(kg): --    PHYSICAL EXAM:  General: NAD, alert and awake  HEENT: NCAT, EOMI  Chest: nonlabored respirations  Abdomen: soft, NT. Colostomy  Extremities: no pedal edema or calf tenderness   : b/l PCNs intact, scant yellow drainage in bags b/l    LABS:                        12.2   5.52  )-----------( 337      ( 26 Feb 2024 06:40 )             38.0   02-26    142  |  111<H>  |  16  ----------------------------<  88  3.6   |  30  |  1.06    Ca    9.2      26 Feb 2024 06:40  Phos  3.0     02-26  Mg     1.9     02-26    TPro  7.2  /  Alb  3.0<L>  /  TBili  0.3  /  DBili  x   /  AST  11<L>  /  ALT  17  /  AlkPhos  87  02-26    I&O's Detail    25 Feb 2024 07:01  -  26 Feb 2024 07:00  --------------------------------------------------------  IN:    IV PiggyBack: 100 mL    Oral Fluid: 600 mL  Total IN: 700 mL    OUT:    Colostomy (mL): 500 mL  Total OUT: 500 mL    Total NET: 200 mL      26 Feb 2024 07:01  -  26 Feb 2024 19:14  --------------------------------------------------------  IN:    IV PiggyBack: 200 mL    Oral Fluid: 480 mL  Total IN: 680 mL    OUT:    Nephrostomy Tube (mL): 200 mL    Nephrostomy Tube (mL): 175 mL  Total OUT: 375 mL    Total NET: 305 mL      A/P: 58M with h/o prostate CA treated with Radiation 4 years ago, subsequently had ureteral strictures requiring stents.   With b/l hydronephrosis despite indwelling metal stents as well as recurrent pseudomonas UTI.  s/p B/l PCN placement by IR today  Follow up am labs  patient needs b/l ureteral metal stent removal - will attempt to add on to OR schedule tomorrow  Continue medical management per primary team  Discussed with Dr. Bautista   Patient seen and examined bedside  S/p IR procedure  feels well, no complaints offered    T(F): 98 (02-26-24 @ 18:23), Max: 98.4 (02-26-24 @ 04:55)  HR: 83 (02-26-24 @ 18:23) (57 - 83)  BP: 153/99 (02-26-24 @ 18:23) (129/90 - 159/97)  RR: 18 (02-26-24 @ 18:23) (18 - 18)  SpO2: 98% (02-26-24 @ 18:23) (96% - 99%)  Wt(kg): --    PHYSICAL EXAM:  General: NAD, alert and awake  HEENT: NCAT, EOMI  Chest: nonlabored respirations  Abdomen: soft, NT. Colostomy  Extremities: no pedal edema or calf tenderness   : b/l PCNs intact, scant yellow drainage in bags b/l    LABS:                        12.2   5.52  )-----------( 337      ( 26 Feb 2024 06:40 )             38.0   02-26    142  |  111<H>  |  16  ----------------------------<  88  3.6   |  30  |  1.06    Ca    9.2      26 Feb 2024 06:40  Phos  3.0     02-26  Mg     1.9     02-26    TPro  7.2  /  Alb  3.0<L>  /  TBili  0.3  /  DBili  x   /  AST  11<L>  /  ALT  17  /  AlkPhos  87  02-26    I&O's Detail    25 Feb 2024 07:01  -  26 Feb 2024 07:00  --------------------------------------------------------  IN:    IV PiggyBack: 100 mL    Oral Fluid: 600 mL  Total IN: 700 mL    OUT:    Colostomy (mL): 500 mL  Total OUT: 500 mL    Total NET: 200 mL      26 Feb 2024 07:01  -  26 Feb 2024 19:14  --------------------------------------------------------  IN:    IV PiggyBack: 200 mL    Oral Fluid: 480 mL  Total IN: 680 mL    OUT:    Nephrostomy Tube (mL): 200 mL    Nephrostomy Tube (mL): 175 mL  Total OUT: 375 mL    Total NET: 305 mL      A/P: 58M with h/o prostate CA treated with Radiation 4 years ago, subsequently had ureteral strictures requiring stents.   With b/l hydronephrosis despite indwelling metal stents as well as recurrent pseudomonas UTI.  s/p B/l PCN placement by IR today  Follow up am labs  continue IV antibiotics  patient needs b/l ureteral metal stent removal - will add on to OR schedule for Wednesday per Dr Bautista.  Continue medical management per primary team  Discussed with Dr. Bautista

## 2024-02-27 ENCOUNTER — TRANSCRIPTION ENCOUNTER (OUTPATIENT)
Age: 59
End: 2024-02-27

## 2024-02-27 LAB
ANION GAP SERPL CALC-SCNC: 3 MMOL/L — LOW (ref 5–17)
APTT BLD: 31.9 SEC — SIGNIFICANT CHANGE UP (ref 24.5–35.6)
BLD GP AB SCN SERPL QL: SIGNIFICANT CHANGE UP
BUN SERPL-MCNC: 18 MG/DL — SIGNIFICANT CHANGE UP (ref 7–23)
CALCIUM SERPL-MCNC: 9 MG/DL — SIGNIFICANT CHANGE UP (ref 8.5–10.1)
CHLORIDE SERPL-SCNC: 110 MMOL/L — HIGH (ref 96–108)
CO2 SERPL-SCNC: 29 MMOL/L — SIGNIFICANT CHANGE UP (ref 22–31)
CREAT SERPL-MCNC: 1.13 MG/DL — SIGNIFICANT CHANGE UP (ref 0.5–1.3)
CULTURE RESULTS: SIGNIFICANT CHANGE UP
CULTURE RESULTS: SIGNIFICANT CHANGE UP
EGFR: 76 ML/MIN/1.73M2 — SIGNIFICANT CHANGE UP
GLUCOSE SERPL-MCNC: 88 MG/DL — SIGNIFICANT CHANGE UP (ref 70–99)
HCT VFR BLD CALC: 36.5 % — LOW (ref 39–50)
HGB BLD-MCNC: 11.6 G/DL — LOW (ref 13–17)
INR BLD: 1.07 RATIO — SIGNIFICANT CHANGE UP (ref 0.85–1.18)
MCHC RBC-ENTMCNC: 27.8 PG — SIGNIFICANT CHANGE UP (ref 27–34)
MCHC RBC-ENTMCNC: 31.8 G/DL — LOW (ref 32–36)
MCV RBC AUTO: 87.5 FL — SIGNIFICANT CHANGE UP (ref 80–100)
NRBC # BLD: 0 /100 WBCS — SIGNIFICANT CHANGE UP (ref 0–0)
PLATELET # BLD AUTO: 331 K/UL — SIGNIFICANT CHANGE UP (ref 150–400)
POTASSIUM SERPL-MCNC: 3.4 MMOL/L — LOW (ref 3.5–5.3)
POTASSIUM SERPL-SCNC: 3.4 MMOL/L — LOW (ref 3.5–5.3)
PROTHROM AB SERPL-ACNC: 12.8 SEC — SIGNIFICANT CHANGE UP (ref 9.5–13)
RBC # BLD: 4.17 M/UL — LOW (ref 4.2–5.8)
RBC # FLD: 14.3 % — SIGNIFICANT CHANGE UP (ref 10.3–14.5)
SODIUM SERPL-SCNC: 142 MMOL/L — SIGNIFICANT CHANGE UP (ref 135–145)
SPECIMEN SOURCE: SIGNIFICANT CHANGE UP
SPECIMEN SOURCE: SIGNIFICANT CHANGE UP
WBC # BLD: 8.86 K/UL — SIGNIFICANT CHANGE UP (ref 3.8–10.5)
WBC # FLD AUTO: 8.86 K/UL — SIGNIFICANT CHANGE UP (ref 3.8–10.5)

## 2024-02-27 PROCEDURE — 99232 SBSQ HOSP IP/OBS MODERATE 35: CPT

## 2024-02-27 PROCEDURE — 99233 SBSQ HOSP IP/OBS HIGH 50: CPT

## 2024-02-27 RX ORDER — TAMSULOSIN HYDROCHLORIDE 0.4 MG/1
0.4 CAPSULE ORAL AT BEDTIME
Refills: 0 | Status: DISCONTINUED | OUTPATIENT
Start: 2024-02-27 | End: 2024-02-28

## 2024-02-27 RX ORDER — PANTOPRAZOLE SODIUM 20 MG/1
40 TABLET, DELAYED RELEASE ORAL
Refills: 0 | Status: DISCONTINUED | OUTPATIENT
Start: 2024-02-27 | End: 2024-02-28

## 2024-02-27 RX ORDER — POTASSIUM CHLORIDE 20 MEQ
40 PACKET (EA) ORAL ONCE
Refills: 0 | Status: COMPLETED | OUTPATIENT
Start: 2024-02-27 | End: 2024-02-27

## 2024-02-27 RX ADMIN — Medication 100 MILLIGRAM(S): at 05:18

## 2024-02-27 RX ADMIN — ZOLPIDEM TARTRATE 5 MILLIGRAM(S): 10 TABLET ORAL at 21:46

## 2024-02-27 RX ADMIN — Medication 240 MILLIGRAM(S): at 05:17

## 2024-02-27 RX ADMIN — PIPERACILLIN AND TAZOBACTAM 25 GRAM(S): 4; .5 INJECTION, POWDER, LYOPHILIZED, FOR SOLUTION INTRAVENOUS at 09:56

## 2024-02-27 RX ADMIN — HYDROMORPHONE HYDROCHLORIDE 1 MILLIGRAM(S): 2 INJECTION INTRAMUSCULAR; INTRAVENOUS; SUBCUTANEOUS at 22:46

## 2024-02-27 RX ADMIN — PIPERACILLIN AND TAZOBACTAM 25 GRAM(S): 4; .5 INJECTION, POWDER, LYOPHILIZED, FOR SOLUTION INTRAVENOUS at 02:27

## 2024-02-27 RX ADMIN — OXYCODONE HYDROCHLORIDE 10 MILLIGRAM(S): 5 TABLET ORAL at 18:42

## 2024-02-27 RX ADMIN — OXYCODONE HYDROCHLORIDE 10 MILLIGRAM(S): 5 TABLET ORAL at 09:55

## 2024-02-27 RX ADMIN — PIPERACILLIN AND TAZOBACTAM 25 GRAM(S): 4; .5 INJECTION, POWDER, LYOPHILIZED, FOR SOLUTION INTRAVENOUS at 18:42

## 2024-02-27 RX ADMIN — Medication 40 MILLIEQUIVALENT(S): at 10:00

## 2024-02-27 RX ADMIN — HYDROMORPHONE HYDROCHLORIDE 1 MILLIGRAM(S): 2 INJECTION INTRAMUSCULAR; INTRAVENOUS; SUBCUTANEOUS at 04:31

## 2024-02-27 RX ADMIN — HYDROMORPHONE HYDROCHLORIDE 1 MILLIGRAM(S): 2 INJECTION INTRAMUSCULAR; INTRAVENOUS; SUBCUTANEOUS at 21:46

## 2024-02-27 RX ADMIN — HYDROMORPHONE HYDROCHLORIDE 1 MILLIGRAM(S): 2 INJECTION INTRAMUSCULAR; INTRAVENOUS; SUBCUTANEOUS at 12:19

## 2024-02-27 RX ADMIN — TAMSULOSIN HYDROCHLORIDE 0.4 MILLIGRAM(S): 0.4 CAPSULE ORAL at 21:46

## 2024-02-27 RX ADMIN — BICTEGRAVIR SODIUM, EMTRICITABINE, AND TENOFOVIR ALAFENAMIDE FUMARATE 1 TABLET(S): 30; 120; 15 TABLET ORAL at 12:19

## 2024-02-27 NOTE — PROGRESS NOTE ADULT - SUBJECTIVE AND OBJECTIVE BOX
CARL ROWLEY  MRN-[id number]    Follow Up:  recurrent uti, hiv     Interval History: the pt was seen and examined earlier, not in acute distress, complains of mild discomfort at new nephrostomy tubes sites, no longer has symptoms of dysuria. Pt is afebrile, RA, no wbc.     PAST MEDICAL & SURGICAL HISTORY:  HTN (hypertension)      HLD (hyperlipidemia)      HIV infection      Anxiety      BPH (benign prostatic hyperplasia)      History of anal cancer      Anal lesion      S/P colostomy      History of gastroscopy      Ureteral stent present          ROS:    [ ] Unobtainable because:  [x ] All other systems negative    Constitutional: no fever, no chills  Head: no trauma  Eyes: no vision changes, no eye pain  ENT:  no sore throat, no rhinorrhea  Cardiovascular:  no chest pain, no palpitation  Respiratory:  no SOB, no cough  GI:  no abd pain, no vomiting, no diarrhea  urinary: no dysuria, no hematuria, mild discomfort at the sites of new nephrostomy tubes   musculoskeletal:  no joint pain, no joint swelling  skin:  no rash  neurology:  no headache, no seizure, no change in mental status  psych: no anxiety, no depression         Allergies  No Known Allergies        ANTIMICROBIALS:  bictegravir 50 mG/emtricitabine 200 mG/tenofovir alafenamide 25 mG (BIKTARVY) 1 daily  piperacillin/tazobactam IVPB.. 3.375 every 8 hours      OTHER MEDS:  acetaminophen     Tablet .. 650 milliGRAM(s) Oral every 6 hours PRN  ALPRAZolam 1 milliGRAM(s) Oral every 6 hours PRN  diltiazem    milliGRAM(s) Oral daily  HYDROmorphone  Injectable 1 milliGRAM(s) IV Push every 4 hours PRN  lactulose Syrup 10 Gram(s) Oral daily  metoprolol succinate  milliGRAM(s) Oral daily  ondansetron Injectable 4 milliGRAM(s) IV Push every 8 hours PRN  oxyCODONE    IR 10 milliGRAM(s) Oral every 6 hours PRN  pantoprazole    Tablet 40 milliGRAM(s) Oral before breakfast  senna 2 Tablet(s) Oral at bedtime  tamsulosin 0.4 milliGRAM(s) Oral at bedtime  zolpidem 5 milliGRAM(s) Oral at bedtime PRN  zolpidem 5 milliGRAM(s) Oral at bedtime PRN      Vital Signs Last 24 Hrs  T(C): 37.4 (27 Feb 2024 16:30), Max: 37.4 (27 Feb 2024 16:30)  T(F): 99.4 (27 Feb 2024 16:30), Max: 99.4 (27 Feb 2024 16:30)  HR: 84 (27 Feb 2024 16:30) (80 - 86)  BP: 122/82 (27 Feb 2024 16:30) (122/82 - 160/99)  BP(mean): 95 (27 Feb 2024 16:30) (95 - 95)  RR: 18 (27 Feb 2024 16:30) (18 - 19)  SpO2: 97% (27 Feb 2024 16:30) (97% - 100%)        Physical Exam:  Constitutional: non-toxic, no distress, RA  HEAD/EYES: anicteric, no conjunctival injection  ENT:  supple, no thrush  Cardiovascular:   normal S1, S2, no murmur, no edema  Respiratory:  clear BS bilaterally, no wheezes, no rales  GI:  soft, non-tender, normal bowel sounds, + colostomy   :  b/l new nephrostomy tubes with mostly clear yellow urine, small amount of blood in present   Musculoskeletal:  no synovitis, normal ROM  Neurologic: awake and alert, normal strength, no focal findings  Skin:  no rash, no erythema, no phlebitis  Heme/Onc: no lymphadenopathy   Psychiatric:  awake, alert, appropriate mood    WBC Count: 8.86 K/uL (02-27 @ 05:30)  WBC Count: 5.52 K/uL (02-26 @ 06:40)  WBC Count: 5.55 K/uL (02-23 @ 06:34)  WBC Count: 9.07 K/uL (02-22 @ 09:20)                            11.6   8.86  )-----------( 331      ( 27 Feb 2024 05:30 )             36.5       02-27    142  |  110<H>  |  18  ----------------------------<  88  3.4<L>   |  29  |  1.13    Ca    9.0      27 Feb 2024 05:30  Phos  3.0     02-26  Mg     1.9     02-26    TPro  7.2  /  Alb  3.0<L>  /  TBili  0.3  /  DBili  x   /  AST  11<L>  /  ALT  17  /  AlkPhos  87  02-26      Urinalysis Basic - ( 27 Feb 2024 05:30 )    Color: x / Appearance: x / SG: x / pH: x  Gluc: 88 mg/dL / Ketone: x  / Bili: x / Urobili: x   Blood: x / Protein: x / Nitrite: x   Leuk Esterase: x / RBC: x / WBC x   Sq Epi: x / Non Sq Epi: x / Bacteria: x        Creatinine Trend: 1.13<--, 1.06<--, 1.08<--, 1.06<--, 1.15<--, 1.13<--      MICROBIOLOGY:  v  .Blood Blood-Peripheral  02-22-24   No growth at 5 days  --  Pseudomonas aeruginosa      .Urine  02-12-24   >100,000 CFU/ml Pseudomonas aeruginosa  --  Pseudomonas aeruginosa        HIV-1 RNA Quantitative, Viral Load Log: NOT DET. lg /mL (02-12-24 @ 09:46)  HIV-1 RNA Quantitative, Viral Load Log: NOT DET. lg /mL (12-11-23 @ 21:15)  HIV-1 RNA Quantitative, Viral Load Log: NOT DET. lg /mL (08-10-23 @ 14:55)  HIV-1 RNA Quantitative, Viral Load Log: NOT DET. lg /mL (07-23-23 @ 07:36)  HIV-1 RNA Quantitative, Viral Load Log: NOT DET. lg /mL (05-11-23 @ 15:18)    SARS-CoV-2: NotDetec (06 Oct 2023 02:40)  SARS-CoV-2: NotDetec (13 Sep 2023 21:20)    RADIOLOGY:

## 2024-02-27 NOTE — PROGRESS NOTE ADULT - SUBJECTIVE AND OBJECTIVE BOX
SURGERY PROGRESS HPI:  Pt seen and examined at bedside. Pt says he feels pain in both kidneys after bilateral PCN's.  Otherwise no complaints.  R PCN 300cc, L PCN 40 overnight.  Today both bags have ~400cc, yellow urine output.      Vital Signs Last 24 Hrs  T(C): 37.3 (27 Feb 2024 10:12), Max: 37.3 (27 Feb 2024 10:12)  T(F): 99.2 (27 Feb 2024 10:12), Max: 99.2 (27 Feb 2024 10:12)  HR: 80 (27 Feb 2024 10:12) (80 - 86)  BP: 132/85 (27 Feb 2024 10:12) (128/91 - 160/99)  BP(mean): --  RR: 18 (27 Feb 2024 10:12) (18 - 19)  SpO2: 100% (27 Feb 2024 10:12) (97% - 100%)          PHYSICAL EXAM:    GENERAL: NAD  HEAD:  Atraumatic, Normocephalic  CHEST/LUNG: Normal work of breathing  HEART: RRR  ABDOMEN: non distended, soft, non tender, no guarding, b/l PCN's in place      I&O's Detail    26 Feb 2024 07:01  -  27 Feb 2024 07:00  --------------------------------------------------------  IN:    IV PiggyBack: 300 mL    Oral Fluid: 780 mL  Total IN: 1080 mL    OUT:    Nephrostomy Tube (mL): 500 mL    Nephrostomy Tube (mL): 215 mL    Voided (mL): 600 mL  Total OUT: 1315 mL    Total NET: -235 mL      27 Feb 2024 07:01  -  27 Feb 2024 13:29  --------------------------------------------------------  IN:    Oral Fluid: 400 mL  Total IN: 400 mL    OUT:    Nephrostomy Tube (mL): 350 mL    Nephrostomy Tube (mL): 420 mL  Total OUT: 770 mL    Total NET: -370 mL

## 2024-02-27 NOTE — PROGRESS NOTE ADULT - ASSESSMENT
58M with h/o prostate CA treated with Radiation 4 years ago, subsequently had ureteral strictures requiring stents. Presently seen with b/l hydronephrosis despite current indwelling metal stents as well as recurrent pseudomonas UTI.    ##Complicated UTI (urinary tract infection).   ##Bilateral Hydronephrosis   For recent urine culture with pseudomonas, continue with zosyn. Bilateral PCN placed on 02/27.  - Pain control--> IV dilaudid prn for severe pain  - Continue IV zosyn.  - Urology to plan for metal stent removal this admission. On OR schedule for Wed 02/28. NPO@[place]  - ID on board    #Benign essential HTN.   ·  monitor BP and titrate BP meds.     #Hyperlipidemia, unspecified.   ·   On Zetia at home.    #HIV disease   · Compliant on HAART (CD4 608, VL undetectable on 2/12/24)  - Continue Biktarvy.    #Constipation due to opioid therapy.   · Continue lactulose and senna.    Diet: DASH NPO@[place]  DVT prophylaxis: Hold for procedure   Dispo: Admit Tele   Code Status:

## 2024-02-27 NOTE — PROGRESS NOTE ADULT - ASSESSMENT
58 years old male with h/o HTN, HLD, HIV on HAART ( CD 4 608, VL undetectable on 2/12/24), h/o jared Ca (s/p chemo, radio therapy, surgery, colostomy 03/2022), prostate Ca s/p radiotherapy, ureteral stricture s/p stent ( most recent exchange with Dr Mathew on 12/13/2023), recent admission with UTI ( urine culture with pseudomonas)  present to ED with complain of dysuria, suprapubic pain. Patient was admitted in 01/2024 for pseudomonas UTI, treated with IV cefepime x 7 days with improvement. 5 days after discharge, patient noted to have dysuria again. Seen by urology, intially started on Augmentin which was later switched to ciprofloxacin x 7 days on 2/12/24 after seeing ID in clinic  CT abd/pelvis with bilateral ureteral stents. Stable bilateral moderate to severe hydronephrosis without obstructing focus. 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 He reports getting better last admission when he received antibiotics.   Would treat with either cefepime or Zosyn. and since Zosyn was started already can continue  follow repeat cultures   although the metal stent he has may have a lower risk of infection any foreign material can become infected and we need to be cognizant of this. Chronic suppressive therapy would not work in this case as the quinolones are already resistant which would be the only oral option.   continue his ARVs, he is well controlled     2/26: no fever, RA, no wbc, Cr and LFTs ok, BCs NGTD, UC grew pseudomonas, Zosyn IV continued, pt is for b/l PCN placement today. HIV medication continued.   Attending addendum:  patient seen prior to his procedures   PCNT placement   Stent removal   continue antibiotics   hopefully these procedures will help alleviate his symptoms though there is a possibility that he will still experience discomfort   pain control   Continue ARVs    2/27: afebrile, RA, no wbc, Cr ok, BCs with no growth to date, UCs were collected yesterday during the procedure R and L, pending, pt with new b/l nephrostomy tubes now, Zosyn IV continued. Pt is scheduled for b/l stents removal possibly tomorrow. HIV medications continued.       Suggestions:  - continue Zosyn IV  - awaiting for UCs sent intra op yesterday   - for b/l stents removal possibly tomorrow  - follow all culture data  - pain control  - monitor pt's stools  - continue Biktarvy   - pain control     Discussed with Dr. Jones   Discussed with the pt

## 2024-02-27 NOTE — PROGRESS NOTE ADULT - ASSESSMENT
58M with h/o prostate CA treated with Radiation 4 years ago, subsequently had ureteral strictures requiring stents.   With b/l hydronephrosis despite indwelling metal stents as well as recurrent pseudomonas UTI.  s/p B/l PCN placement by IR yesterday    continue zosyn per ID  b/l ureteral metal stent removal booked for wed  Continue medical management per primary team     58M with h/o prostate CA and Anal ca,  treated with Radiation 4 years ago, subsequently had ureteral strictures requiring stents.   With b/l hydronephrosis despite indwelling metal stents as well as recurrent pseudomonas UTI.  s/p B/l PCN placement by IR yesterday    continue zosyn per ID  b/l ureteral metal stent removal booked for wed  Continue medical management per primary team

## 2024-02-27 NOTE — PROGRESS NOTE ADULT - SUBJECTIVE AND OBJECTIVE BOX
Patient is a 58y old  Male who presents with a chief complaint of complicated UTI due to pseudomonas (2024 19:10)    INTERVAL HPI/OVERNIGHT EVENTS: NAEON      MEDICATIONS  (STANDING):  bictegravir 50 mG/emtricitabine 200 mG/tenofovir alafenamide 25 mG (BIKTARVY) 1 Tablet(s) Oral daily  diltiazem    milliGRAM(s) Oral daily  lactulose Syrup 10 Gram(s) Oral daily  metoprolol succinate  milliGRAM(s) Oral daily  piperacillin/tazobactam IVPB.. 3.375 Gram(s) IV Intermittent every 8 hours  potassium chloride    Tablet ER 40 milliEquivalent(s) Oral once  senna 2 Tablet(s) Oral at bedtime    MEDICATIONS  (PRN):  acetaminophen     Tablet .. 650 milliGRAM(s) Oral every 6 hours PRN Temp greater or equal to 38C (100.4F), Mild Pain (1 - 3)  ALPRAZolam 1 milliGRAM(s) Oral every 6 hours PRN for anxiety  HYDROmorphone  Injectable 1 milliGRAM(s) IV Push every 4 hours PRN Severe Pain (7 - 10)  ondansetron Injectable 4 milliGRAM(s) IV Push every 8 hours PRN Nausea and/or Vomiting  oxyCODONE    IR 10 milliGRAM(s) Oral every 6 hours PRN Moderate Pain (4 - 6)  zolpidem 5 milliGRAM(s) Oral at bedtime PRN Insomnia  zolpidem 5 milliGRAM(s) Oral at bedtime PRN Insomnia    Allergies    No Known Allergies    Intolerances      REVIEW OF SYSTEMS:  All other systems reviewed and are negative    Vital Signs Last 24 Hrs  T(C): 37 (2024 04:30), Max: 37.1 (2024 23:34)  T(F): 98.6 (2024 04:30), Max: 98.8 (2024 23:34)  HR: 86 (2024 04:30) (62 - 86)  BP: 128/91 (2024 04:30) (128/91 - 160/99)  BP(mean): --  RR: 19 (2024 04:30) (18 - 19)  SpO2: 98% (2024 04:30) (97% - 98%)      Daily     Daily Weight in k (2024 04:30)  I&O's Summary    2024 07:01  -  2024 07:00  --------------------------------------------------------  IN: 1080 mL / OUT: 1315 mL / NET: -235 mL      CAPILLARY BLOOD GLUCOSE        PHYSICAL EXAM:  Constitutional: non-toxic, no distress, RA  HEAD/EYES: anicteric, no conjunctival injection  ENT:  supple, no thrush  Cardiovascular:   normal S1, S2, no murmur, no edema  Respiratory:  clear BS bilaterally, no wheezes, no rales  GI:  soft, non-tender, normal bowel sounds, + colostomy with small amount of stool in the bag   :  no davis, no CVA tenderness. Nephrostomy tubes noted draining***  Musculoskeletal:  no synovitis, normal ROM  Neurologic: awake and alert, normal strength, no focal findings  Skin:  no rash, no erythema, no phlebitis  Heme/Onc: no lymphadenopathy   Psychiatric:  awake, alert, appropriate mood  Labs                          11.6   8.86  )-----------( 331      ( 2024 05:30 )             36.5     02    142  |  110<H>  |  18  ----------------------------<  88  3.4<L>   |  29  |  1.13    Ca    9.0      2024 05:30  Phos  3.0       Mg     1.9         TPro  7.2  /  Alb  3.0<L>  /  TBili  0.3  /  DBili  x   /  AST  11<L>  /  ALT  17  /  AlkPhos  87      PT/INR - ( 2024 05:30 )   PT: 12.8 sec;   INR: 1.07 ratio         PTT - ( 2024 05:30 )  PTT:31.9 sec      Urinalysis Basic - ( 2024 05:30 )    Color: x / Appearance: x / SG: x / pH: x  Gluc: 88 mg/dL / Ketone: x  / Bili: x / Urobili: x   Blood: x / Protein: x / Nitrite: x   Leuk Esterase: x / RBC: x / WBC x   Sq Epi: x / Non Sq Epi: x / Bacteria: x                  DVT prophylaxis: > Lovenox 40mg SQ daily  > Heparin   > SCD's Patient is a 58y old  Male who presents with a chief complaint of complicated UTI due to pseudomonas (2024 19:10)    INTERVAL HPI/OVERNIGHT EVENTS: NAEON, complaining of pain       MEDICATIONS  (STANDING):  bictegravir 50 mG/emtricitabine 200 mG/tenofovir alafenamide 25 mG (BIKTARVY) 1 Tablet(s) Oral daily  diltiazem    milliGRAM(s) Oral daily  lactulose Syrup 10 Gram(s) Oral daily  metoprolol succinate  milliGRAM(s) Oral daily  piperacillin/tazobactam IVPB.. 3.375 Gram(s) IV Intermittent every 8 hours  potassium chloride    Tablet ER 40 milliEquivalent(s) Oral once  senna 2 Tablet(s) Oral at bedtime    MEDICATIONS  (PRN):  acetaminophen     Tablet .. 650 milliGRAM(s) Oral every 6 hours PRN Temp greater or equal to 38C (100.4F), Mild Pain (1 - 3)  ALPRAZolam 1 milliGRAM(s) Oral every 6 hours PRN for anxiety  HYDROmorphone  Injectable 1 milliGRAM(s) IV Push every 4 hours PRN Severe Pain (7 - 10)  ondansetron Injectable 4 milliGRAM(s) IV Push every 8 hours PRN Nausea and/or Vomiting  oxyCODONE    IR 10 milliGRAM(s) Oral every 6 hours PRN Moderate Pain (4 - 6)  zolpidem 5 milliGRAM(s) Oral at bedtime PRN Insomnia  zolpidem 5 milliGRAM(s) Oral at bedtime PRN Insomnia    Allergies    No Known Allergies    Intolerances      REVIEW OF SYSTEMS:  All other systems reviewed and are negative    Vital Signs Last 24 Hrs  T(C): 37 (2024 04:30), Max: 37.1 (2024 23:34)  T(F): 98.6 (2024 04:30), Max: 98.8 (2024 23:34)  HR: 86 (2024 04:30) (62 - 86)  BP: 128/91 (2024 04:30) (128/91 - 160/99)  BP(mean): --  RR: 19 (2024 04:30) (18 - 19)  SpO2: 98% (2024 04:30) (97% - 98%)      Daily     Daily Weight in k (2024 04:30)  I&O's Summary    2024 07:01  -  2024 07:00  --------------------------------------------------------  IN: 1080 mL / OUT: 1315 mL / NET: -235 mL      CAPILLARY BLOOD GLUCOSE        PHYSICAL EXAM:  Constitutional: non-toxic, no distress, RA  HEAD/EYES: anicteric, no conjunctival injection  ENT:  supple, no thrush  Cardiovascular:   normal S1, S2, no murmur, no edema  Respiratory:  clear BS bilaterally, no wheezes, no rales  GI:  soft, non-tender, normal bowel sounds, + colostomy with small amount of stool in the bag   :  no davis, no CVA tenderness. Nephrostomy tubes noted draining  Musculoskeletal:  no synovitis, normal ROM  Neurologic: awake and alert, normal strength, no focal findings  Skin:  no rash, no erythema, no phlebitis  Heme/Onc: no lymphadenopathy   Psychiatric:  awake, alert, appropriate mood  Labs                          11.6   8.86  )-----------( 331      ( 2024 05:30 )             36.5     02    142  |  110<H>  |  18  ----------------------------<  88  3.4<L>   |  29  |  1.13    Ca    9.0      2024 05:30  Phos  3.0       Mg     1.9         TPro  7.2  /  Alb  3.0<L>  /  TBili  0.3  /  DBili  x   /  AST  11<L>  /  ALT  17  /  AlkPhos  87      PT/INR - ( 2024 05:30 )   PT: 12.8 sec;   INR: 1.07 ratio         PTT - ( 2024 05:30 )  PTT:31.9 sec      Urinalysis Basic - ( 2024 05:30 )    Color: x / Appearance: x / SG: x / pH: x  Gluc: 88 mg/dL / Ketone: x  / Bili: x / Urobili: x   Blood: x / Protein: x / Nitrite: x   Leuk Esterase: x / RBC: x / WBC x   Sq Epi: x / Non Sq Epi: x / Bacteria: x                  DVT prophylaxis: > Lovenox 40mg SQ daily  > Heparin   > SCD's

## 2024-02-28 LAB
ANION GAP SERPL CALC-SCNC: 4 MMOL/L — LOW (ref 5–17)
BASOPHILS # BLD AUTO: 0.05 K/UL — SIGNIFICANT CHANGE UP (ref 0–0.2)
BASOPHILS NFR BLD AUTO: 0.7 % — SIGNIFICANT CHANGE UP (ref 0–2)
BUN SERPL-MCNC: 19 MG/DL — SIGNIFICANT CHANGE UP (ref 7–23)
CALCIUM SERPL-MCNC: 8.9 MG/DL — SIGNIFICANT CHANGE UP (ref 8.5–10.1)
CHLORIDE SERPL-SCNC: 112 MMOL/L — HIGH (ref 96–108)
CO2 SERPL-SCNC: 27 MMOL/L — SIGNIFICANT CHANGE UP (ref 22–31)
CREAT SERPL-MCNC: 0.78 MG/DL — SIGNIFICANT CHANGE UP (ref 0.5–1.3)
EGFR: 104 ML/MIN/1.73M2 — SIGNIFICANT CHANGE UP
EOSINOPHIL # BLD AUTO: 0.05 K/UL — SIGNIFICANT CHANGE UP (ref 0–0.5)
EOSINOPHIL NFR BLD AUTO: 0.7 % — SIGNIFICANT CHANGE UP (ref 0–6)
GLUCOSE SERPL-MCNC: 92 MG/DL — SIGNIFICANT CHANGE UP (ref 70–99)
HCT VFR BLD CALC: 34.9 % — LOW (ref 39–50)
HGB BLD-MCNC: 11.1 G/DL — LOW (ref 13–17)
IMM GRANULOCYTES NFR BLD AUTO: 0.4 % — SIGNIFICANT CHANGE UP (ref 0–0.9)
LYMPHOCYTES # BLD AUTO: 2.49 K/UL — SIGNIFICANT CHANGE UP (ref 1–3.3)
LYMPHOCYTES # BLD AUTO: 33.8 % — SIGNIFICANT CHANGE UP (ref 13–44)
MAGNESIUM SERPL-MCNC: 1.9 MG/DL — SIGNIFICANT CHANGE UP (ref 1.6–2.6)
MCHC RBC-ENTMCNC: 27.8 PG — SIGNIFICANT CHANGE UP (ref 27–34)
MCHC RBC-ENTMCNC: 31.8 G/DL — LOW (ref 32–36)
MCV RBC AUTO: 87.5 FL — SIGNIFICANT CHANGE UP (ref 80–100)
MONOCYTES # BLD AUTO: 1.08 K/UL — HIGH (ref 0–0.9)
MONOCYTES NFR BLD AUTO: 14.7 % — HIGH (ref 2–14)
NEUTROPHILS # BLD AUTO: 3.66 K/UL — SIGNIFICANT CHANGE UP (ref 1.8–7.4)
NEUTROPHILS NFR BLD AUTO: 49.7 % — SIGNIFICANT CHANGE UP (ref 43–77)
NRBC # BLD: 0 /100 WBCS — SIGNIFICANT CHANGE UP (ref 0–0)
PLATELET # BLD AUTO: 320 K/UL — SIGNIFICANT CHANGE UP (ref 150–400)
POTASSIUM SERPL-MCNC: 3.5 MMOL/L — SIGNIFICANT CHANGE UP (ref 3.5–5.3)
POTASSIUM SERPL-SCNC: 3.5 MMOL/L — SIGNIFICANT CHANGE UP (ref 3.5–5.3)
RBC # BLD: 3.99 M/UL — LOW (ref 4.2–5.8)
RBC # FLD: 14.4 % — SIGNIFICANT CHANGE UP (ref 10.3–14.5)
SODIUM SERPL-SCNC: 143 MMOL/L — SIGNIFICANT CHANGE UP (ref 135–145)
WBC # BLD: 7.36 K/UL — SIGNIFICANT CHANGE UP (ref 3.8–10.5)
WBC # FLD AUTO: 7.36 K/UL — SIGNIFICANT CHANGE UP (ref 3.8–10.5)

## 2024-02-28 PROCEDURE — 99232 SBSQ HOSP IP/OBS MODERATE 35: CPT

## 2024-02-28 PROCEDURE — 52310 CYSTOSCOPY AND TREATMENT: CPT

## 2024-02-28 PROCEDURE — 99233 SBSQ HOSP IP/OBS HIGH 50: CPT

## 2024-02-28 RX ORDER — ZOLPIDEM TARTRATE 10 MG/1
5 TABLET ORAL AT BEDTIME
Refills: 0 | Status: DISCONTINUED | OUTPATIENT
Start: 2024-02-28 | End: 2024-02-29

## 2024-02-28 RX ORDER — LACTULOSE 10 G/15ML
10 SOLUTION ORAL DAILY
Refills: 0 | Status: DISCONTINUED | OUTPATIENT
Start: 2024-02-28 | End: 2024-02-29

## 2024-02-28 RX ORDER — ALPRAZOLAM 0.25 MG
1 TABLET ORAL EVERY 6 HOURS
Refills: 0 | Status: DISCONTINUED | OUTPATIENT
Start: 2024-02-28 | End: 2024-02-29

## 2024-02-28 RX ORDER — FENTANYL CITRATE 50 UG/ML
25 INJECTION INTRAVENOUS
Refills: 0 | Status: DISCONTINUED | OUTPATIENT
Start: 2024-02-28 | End: 2024-02-28

## 2024-02-28 RX ORDER — PIPERACILLIN AND TAZOBACTAM 4; .5 G/20ML; G/20ML
3.38 INJECTION, POWDER, LYOPHILIZED, FOR SOLUTION INTRAVENOUS EVERY 8 HOURS
Refills: 0 | Status: DISCONTINUED | OUTPATIENT
Start: 2024-02-28 | End: 2024-02-29

## 2024-02-28 RX ORDER — ONDANSETRON 8 MG/1
4 TABLET, FILM COATED ORAL EVERY 8 HOURS
Refills: 0 | Status: DISCONTINUED | OUTPATIENT
Start: 2024-02-28 | End: 2024-02-29

## 2024-02-28 RX ORDER — ACETAMINOPHEN 500 MG
650 TABLET ORAL EVERY 6 HOURS
Refills: 0 | Status: DISCONTINUED | OUTPATIENT
Start: 2024-02-28 | End: 2024-02-29

## 2024-02-28 RX ORDER — DILTIAZEM HCL 120 MG
240 CAPSULE, EXT RELEASE 24 HR ORAL DAILY
Refills: 0 | Status: DISCONTINUED | OUTPATIENT
Start: 2024-02-28 | End: 2024-02-29

## 2024-02-28 RX ORDER — PANTOPRAZOLE SODIUM 20 MG/1
40 TABLET, DELAYED RELEASE ORAL
Refills: 0 | Status: DISCONTINUED | OUTPATIENT
Start: 2024-02-28 | End: 2024-02-29

## 2024-02-28 RX ORDER — SENNA PLUS 8.6 MG/1
2 TABLET ORAL AT BEDTIME
Refills: 0 | Status: DISCONTINUED | OUTPATIENT
Start: 2024-02-28 | End: 2024-02-29

## 2024-02-28 RX ORDER — HYDROMORPHONE HYDROCHLORIDE 2 MG/ML
1 INJECTION INTRAMUSCULAR; INTRAVENOUS; SUBCUTANEOUS EVERY 4 HOURS
Refills: 0 | Status: DISCONTINUED | OUTPATIENT
Start: 2024-02-28 | End: 2024-02-29

## 2024-02-28 RX ORDER — SODIUM CHLORIDE 9 MG/ML
1000 INJECTION, SOLUTION INTRAVENOUS
Refills: 0 | Status: DISCONTINUED | OUTPATIENT
Start: 2024-02-28 | End: 2024-02-28

## 2024-02-28 RX ORDER — HYDROMORPHONE HYDROCHLORIDE 2 MG/ML
0.5 INJECTION INTRAMUSCULAR; INTRAVENOUS; SUBCUTANEOUS
Refills: 0 | Status: DISCONTINUED | OUTPATIENT
Start: 2024-02-28 | End: 2024-02-28

## 2024-02-28 RX ORDER — TAMSULOSIN HYDROCHLORIDE 0.4 MG/1
0.4 CAPSULE ORAL AT BEDTIME
Refills: 0 | Status: DISCONTINUED | OUTPATIENT
Start: 2024-02-28 | End: 2024-02-29

## 2024-02-28 RX ORDER — OXYCODONE HYDROCHLORIDE 5 MG/1
10 TABLET ORAL EVERY 6 HOURS
Refills: 0 | Status: DISCONTINUED | OUTPATIENT
Start: 2024-02-28 | End: 2024-02-29

## 2024-02-28 RX ORDER — BICTEGRAVIR SODIUM, EMTRICITABINE, AND TENOFOVIR ALAFENAMIDE FUMARATE 30; 120; 15 MG/1; MG/1; MG/1
1 TABLET ORAL DAILY
Refills: 0 | Status: DISCONTINUED | OUTPATIENT
Start: 2024-02-28 | End: 2024-02-29

## 2024-02-28 RX ORDER — METOPROLOL TARTRATE 50 MG
100 TABLET ORAL DAILY
Refills: 0 | Status: DISCONTINUED | OUTPATIENT
Start: 2024-02-28 | End: 2024-02-29

## 2024-02-28 RX ADMIN — PIPERACILLIN AND TAZOBACTAM 25 GRAM(S): 4; .5 INJECTION, POWDER, LYOPHILIZED, FOR SOLUTION INTRAVENOUS at 21:31

## 2024-02-28 RX ADMIN — HYDROMORPHONE HYDROCHLORIDE 1 MILLIGRAM(S): 2 INJECTION INTRAMUSCULAR; INTRAVENOUS; SUBCUTANEOUS at 14:43

## 2024-02-28 RX ADMIN — PIPERACILLIN AND TAZOBACTAM 25 GRAM(S): 4; .5 INJECTION, POWDER, LYOPHILIZED, FOR SOLUTION INTRAVENOUS at 02:38

## 2024-02-28 RX ADMIN — HYDROMORPHONE HYDROCHLORIDE 1 MILLIGRAM(S): 2 INJECTION INTRAMUSCULAR; INTRAVENOUS; SUBCUTANEOUS at 09:47

## 2024-02-28 RX ADMIN — HYDROMORPHONE HYDROCHLORIDE 1 MILLIGRAM(S): 2 INJECTION INTRAMUSCULAR; INTRAVENOUS; SUBCUTANEOUS at 22:53

## 2024-02-28 RX ADMIN — TAMSULOSIN HYDROCHLORIDE 0.4 MILLIGRAM(S): 0.4 CAPSULE ORAL at 21:29

## 2024-02-28 RX ADMIN — HYDROMORPHONE HYDROCHLORIDE 1 MILLIGRAM(S): 2 INJECTION INTRAMUSCULAR; INTRAVENOUS; SUBCUTANEOUS at 04:49

## 2024-02-28 RX ADMIN — HYDROMORPHONE HYDROCHLORIDE 1 MILLIGRAM(S): 2 INJECTION INTRAMUSCULAR; INTRAVENOUS; SUBCUTANEOUS at 10:45

## 2024-02-28 RX ADMIN — HYDROMORPHONE HYDROCHLORIDE 1 MILLIGRAM(S): 2 INJECTION INTRAMUSCULAR; INTRAVENOUS; SUBCUTANEOUS at 20:16

## 2024-02-28 RX ADMIN — HYDROMORPHONE HYDROCHLORIDE 1 MILLIGRAM(S): 2 INJECTION INTRAMUSCULAR; INTRAVENOUS; SUBCUTANEOUS at 05:49

## 2024-02-28 RX ADMIN — HYDROMORPHONE HYDROCHLORIDE 1 MILLIGRAM(S): 2 INJECTION INTRAMUSCULAR; INTRAVENOUS; SUBCUTANEOUS at 15:45

## 2024-02-28 RX ADMIN — PIPERACILLIN AND TAZOBACTAM 25 GRAM(S): 4; .5 INJECTION, POWDER, LYOPHILIZED, FOR SOLUTION INTRAVENOUS at 09:46

## 2024-02-28 RX ADMIN — ZOLPIDEM TARTRATE 5 MILLIGRAM(S): 10 TABLET ORAL at 21:30

## 2024-02-28 NOTE — PROGRESS NOTE ADULT - SUBJECTIVE AND OBJECTIVE BOX
CARL ROWLEY  MRN-[id number]    Follow Up:  HIV, recurrent uti     Interval History: the pt was seen and examined earlier, not in acute distress, awake and alert, appropriate, continues to complain of pain at nephrostomy tubes insertion site. Pt is afebrile, RA.     PAST MEDICAL & SURGICAL HISTORY:  HTN (hypertension)      HLD (hyperlipidemia)      HIV infection      Anxiety      BPH (benign prostatic hyperplasia)      History of anal cancer      Anal lesion      S/P colostomy      History of gastroscopy      Ureteral stent present          ROS:    [ ] Unobtainable because:  [ x] All other systems negative    Constitutional: no fever, no chills  Head: no trauma  Eyes: no vision changes, no eye pain  ENT:  no sore throat, no rhinorrhea  Cardiovascular:  no chest pain, no palpitation  Respiratory:  no SOB, no cough  GI:  no abd pain, no vomiting, no diarrhea  urinary: no dysuria, no hematuria, + pain at the nephrostomy insertion site b/l  musculoskeletal:  no joint pain, no joint swelling  skin:  no rash  neurology:  no headache, no seizure, no change in mental status  psych: no anxiety, no depression         Allergies  No Known Allergies        ANTIMICROBIALS:  bictegravir 50 mG/emtricitabine 200 mG/tenofovir alafenamide 25 mG (BIKTARVY) 1 daily  piperacillin/tazobactam IVPB.. 3.375 every 8 hours      OTHER MEDS:  acetaminophen     Tablet .. 650 milliGRAM(s) Oral every 6 hours PRN  ALPRAZolam 1 milliGRAM(s) Oral every 6 hours PRN  diltiazem    milliGRAM(s) Oral daily  HYDROmorphone  Injectable 1 milliGRAM(s) IV Push every 4 hours PRN  lactulose Syrup 10 Gram(s) Oral daily  metoprolol succinate  milliGRAM(s) Oral daily  ondansetron Injectable 4 milliGRAM(s) IV Push every 8 hours PRN  oxyCODONE    IR 10 milliGRAM(s) Oral every 6 hours PRN  pantoprazole    Tablet 40 milliGRAM(s) Oral before breakfast  senna 2 Tablet(s) Oral at bedtime  tamsulosin 0.4 milliGRAM(s) Oral at bedtime  zolpidem 5 milliGRAM(s) Oral at bedtime PRN  zolpidem 5 milliGRAM(s) Oral at bedtime PRN      Vital Signs Last 24 Hrs  T(C): 37.1 (28 Feb 2024 10:25), Max: 37.4 (27 Feb 2024 16:30)  T(F): 98.8 (28 Feb 2024 10:25), Max: 99.4 (27 Feb 2024 16:30)  HR: 89 (28 Feb 2024 10:25) (76 - 89)  BP: 125/88 (28 Feb 2024 10:25) (108/77 - 125/88)  BP(mean): 83 (28 Feb 2024 04:57) (83 - 95)  RR: 18 (28 Feb 2024 10:25) (17 - 19)  SpO2: 96% (28 Feb 2024 10:25) (96% - 97%)    Parameters below as of 28 Feb 2024 04:57  Patient On (Oxygen Delivery Method): room air        Physical Exam:  Constitutional: non-toxic, no distress, RA  HEAD/EYES: anicteric, no conjunctival injection  ENT:  supple, no thrush  Cardiovascular:   normal S1, S2, no murmur, no edema  Respiratory:  clear BS bilaterally, no wheezes, no rales  GI:  soft, non-tender, normal bowel sounds, + colostomy with small amount of brown stool in the bag   :  b/l new nephrostomy tubes with somewhat dark urine  Musculoskeletal:  no synovitis, normal ROM  Neurologic: awake and alert, normal strength, no focal findings  Skin:  no rash, no erythema, no phlebitis  Heme/Onc: no lymphadenopathy   Psychiatric:  awake, alert, appropriate mood    WBC Count: 7.36 K/uL (02-28 @ 05:20)  WBC Count: 8.86 K/uL (02-27 @ 05:30)  WBC Count: 5.52 K/uL (02-26 @ 06:40)  WBC Count: 5.55 K/uL (02-23 @ 06:34)  WBC Count: 9.07 K/uL (02-22 @ 09:20)                            11.1   7.36  )-----------( 320      ( 28 Feb 2024 05:20 )             34.9       02-28    143  |  112<H>  |  19  ----------------------------<  92  3.5   |  27  |  0.78    Ca    8.9      28 Feb 2024 05:20  Mg     1.9     02-28        Urinalysis Basic - ( 28 Feb 2024 05:20 )    Color: x / Appearance: x / SG: x / pH: x  Gluc: 92 mg/dL / Ketone: x  / Bili: x / Urobili: x   Blood: x / Protein: x / Nitrite: x   Leuk Esterase: x / RBC: x / WBC x   Sq Epi: x / Non Sq Epi: x / Bacteria: x        Creatinine Trend: 0.78<--, 1.13<--, 1.06<--, 1.08<--, 1.06<--, 1.15<--      MICROBIOLOGY:  v  Kidney Nephrostomy - Right  02-26-24   No growth to date  --  --      Kidney Nephrostomy - Left  02-26-24   >100,000 CFU/ml Gram Negative Rods  --  --      .Blood Blood-Peripheral  02-22-24   No growth at 5 days  --  Pseudomonas aeruginosa      .Urine  02-12-24   >100,000 CFU/ml Pseudomonas aeruginosa  --  Pseudomonas aeruginosa        HIV-1 RNA Quantitative, Viral Load Log: NOT DET. lg /mL (02-12-24 @ 09:46)  HIV-1 RNA Quantitative, Viral Load Log: NOT DET. lg /mL (12-11-23 @ 21:15)  HIV-1 RNA Quantitative, Viral Load Log: NOT DET. lg /mL (08-10-23 @ 14:55)  HIV-1 RNA Quantitative, Viral Load Log: NOT DET. lg /mL (07-23-23 @ 07:36)  HIV-1 RNA Quantitative, Viral Load Log: NOT DET. lg /mL (05-11-23 @ 15:18)      SARS-CoV-2: NotDetec (06 Oct 2023 02:40)  SARS-CoV-2: NotDetec (13 Sep 2023 21:20)    RADIOLOGY:

## 2024-02-28 NOTE — PROGRESS NOTE ADULT - SUBJECTIVE AND OBJECTIVE BOX
HPI:  58 years old male with h/o HTN, HLD, HIV on HAART ( CD 4 608, VL undetectable on 2/12/24), h/o jared Ca (s/p chemo, radio therapy, surgery, colostomy 03/2022), prostate Ca s/p radiotherapy, ureteral stricture s/p stent ( most recent exchange with Dr Mathew on 12/13/2023), recent admission with UTI ( urine culture with pseudomonas)  present to ED with complain of dysuria, suprapubic pain. Patient was admitted in 01/2024 for pseudomonas UTI, treated with IV cefepime x 7 days with improvement. 5 days after discharge, patient noted to have dysuria again. Seen by urology, intially started on Augmentin which was later switched to ciprofloxacin x 7 days on 2/12/24 after seeing ID in clinic. Patient also received Bicillin L-A IM in ID clinic as well. No fever or chills.   Hemodynamically stable, afebrile, sat well at RA. No leukocytosis, plt 355, K 4.1, Cr 1.15, lactate 2.3. UA dirty. CT abd/pelvis with bilateral ureteral stents. Stable bilateral moderate to severe hydronephrosis without obstructing focus. Bilateral periureteral and perivesicular stranding may be reactive and/or reflect UTI. Lingular consolidation      SH: no toxic habits  FH: HTN (22 Feb 2024 13:51)  Patient is a 58y old  Male who presents with a chief complaint of complicated UTI due to pseudomonas (28 Feb 2024 14:23)      INTERVAL HPI/OVERNIGHT EVENTS: in OR     MEDICATIONS  (STANDING):  bictegravir 50 mG/emtricitabine 200 mG/tenofovir alafenamide 25 mG (BIKTARVY) 1 Tablet(s) Oral daily  diltiazem    milliGRAM(s) Oral daily  lactulose Syrup 10 Gram(s) Oral daily  metoprolol succinate  milliGRAM(s) Oral daily  pantoprazole    Tablet 40 milliGRAM(s) Oral before breakfast  piperacillin/tazobactam IVPB.. 3.375 Gram(s) IV Intermittent every 8 hours  senna 2 Tablet(s) Oral at bedtime  tamsulosin 0.4 milliGRAM(s) Oral at bedtime    MEDICATIONS  (PRN):  acetaminophen     Tablet .. 650 milliGRAM(s) Oral every 6 hours PRN Temp greater or equal to 38C (100.4F), Mild Pain (1 - 3)  ALPRAZolam 1 milliGRAM(s) Oral every 6 hours PRN for anxiety  HYDROmorphone  Injectable 1 milliGRAM(s) IV Push every 4 hours PRN Severe Pain (7 - 10)  ondansetron Injectable 4 milliGRAM(s) IV Push every 8 hours PRN Nausea and/or Vomiting  oxyCODONE    IR 10 milliGRAM(s) Oral every 6 hours PRN Moderate Pain (4 - 6)  zolpidem 5 milliGRAM(s) Oral at bedtime PRN Insomnia  zolpidem 5 milliGRAM(s) Oral at bedtime PRN Insomnia      Allergies    No Known Allergies    Intolerances        REVIEW OF SYSTEMS:  CONSTITUTIONAL: No fever, weight loss, or fatigue  EYES: No eye pain, visual disturbances, or discharge  ENMT:  No difficulty hearing, tinnitus, vertigo; No sinus or throat pain  NECK: No pain or stiffness  BREASTS: No pain, masses, or nipple discharge  RESPIRATORY: No cough, wheezing, chills or hemoptysis; No shortness of breath  CARDIOVASCULAR: No chest pain, palpitations, dizziness, or leg swelling  GASTROINTESTINAL: No abdominal or epigastric pain. No nausea, vomiting, or hematemesis; No diarrhea or constipation. No melena or hematochezia.  GENITOURINARY: No dysuria, frequency, hematuria, or incontinence  NEUROLOGICAL: No headaches, memory loss, loss of strength, numbness, or tremors  SKIN: No itching, burning, rashes, or lesions   LYMPH NODES: No enlarged glands  ENDOCRINE: No heat or cold intolerance; No hair loss  MUSCULOSKELETAL: No joint pain or swelling; No muscle, back, or extremity pain  PSYCHIATRIC: No depression, anxiety, mood swings, or difficulty sleeping  HEME/LYMPH: No easy bruising, or bleeding gums  ALLERGY AND IMMUNOLOGIC: No hives or eczema    Vital Signs Last 24 Hrs  T(C): 37.1 (28 Feb 2024 10:25), Max: 37.4 (27 Feb 2024 16:30)  T(F): 98.8 (28 Feb 2024 10:25), Max: 99.4 (27 Feb 2024 16:30)  HR: 89 (28 Feb 2024 10:25) (76 - 89)  BP: 125/88 (28 Feb 2024 10:25) (108/77 - 125/88)  BP(mean): 83 (28 Feb 2024 04:57) (83 - 95)  RR: 18 (28 Feb 2024 10:25) (17 - 19)  SpO2: 96% (28 Feb 2024 10:25) (96% - 97%)    Parameters below as of 28 Feb 2024 04:57  Patient On (Oxygen Delivery Method): room air        PHYSICAL EXAM:  GENERAL: NAD, well-groomed, well-developed  HEAD:  Atraumatic, Normocephalic  EYES: EOMI, PERRLA, conjunctiva and sclera clear  ENMT: No tonsillar erythema, exudates, or enlargement; Moist mucous membranes, Good dentition, No lesions  NECK: Supple, No JVD, Normal thyroid  NERVOUS SYSTEM:  Alert & Oriented X3, Good concentration; Motor Strength 5/5 B/L upper and lower extremities; DTRs 2+ intact and symmetric  CHEST/LUNG: Clear to ascultation  bilaterally; No rales, rhonchi, wheezing, or rubs  HEART: Regular rate and rhythm; No murmurs, rubs, or gallops  ABDOMEN: Soft, Nontender, Nondistended; Bowel sounds present  EXTREMITIES:  2+ Peripheral Pulses, No clubbing, cyanosis, or edema  LYMPH: No lymphadenopathy noted  SKIN: No rashes or lesions    LABS:                        11.1   7.36  )-----------( 320      ( 28 Feb 2024 05:20 )             34.9     02-28    143  |  112<H>  |  19  ----------------------------<  92  3.5   |  27  |  0.78    Ca    8.9      28 Feb 2024 05:20  Mg     1.9     02-28      PT/INR - ( 27 Feb 2024 05:30 )   PT: 12.8 sec;   INR: 1.07 ratio         PTT - ( 27 Feb 2024 05:30 )  PTT:31.9 sec  Urinalysis Basic - ( 28 Feb 2024 05:20 )    Color: x / Appearance: x / SG: x / pH: x  Gluc: 92 mg/dL / Ketone: x  / Bili: x / Urobili: x   Blood: x / Protein: x / Nitrite: x   Leuk Esterase: x / RBC: x / WBC x   Sq Epi: x / Non Sq Epi: x / Bacteria: x      CAPILLARY BLOOD GLUCOSE          RADIOLOGY & ADDITIONAL TESTS:    Imaging Personally Reviewed:  [ ] YES  [ ] NO    Consultant(s) Notes Reviewed:  [ ] YES  [ ] NO    Care Discussed with Consultants/Other Providers [ ] YES  [ ] NO

## 2024-02-28 NOTE — PROGRESS NOTE ADULT - SUBJECTIVE AND OBJECTIVE BOX
Pre-operative Note    - Pre-operative Diagnosis: recurrent pseudomonal urinary tract infection     - Procedure: ureteral stent removal     - Surgeon: Dr. Mathew     - Labs:                        11.1   7.36  )-----------( 320      ( 28 Feb 2024 05:20 )             34.9     02-28    143  |  112<H>  |  19  ----------------------------<  92  3.5   |  27  |  0.78    Ca    8.9      28 Feb 2024 05:20  Mg     1.9     02-28      PT/INR - ( 27 Feb 2024 05:30 )   PT: 12.8 sec;   INR: 1.07 ratio         PTT - ( 27 Feb 2024 05:30 )  PTT:31.9 sec      Orders:     [X] Type & Screen  [X] CBC  [X] BMP  [X] PT/PTT/INR  [X] Urinalysis  [X] NPO/IVF  [X] Anti-coagulation held      > Blood: Not needed.     - Permits:  > Consent in chart.  > Case scheduled with OR.

## 2024-02-28 NOTE — PROGRESS NOTE ADULT - ASSESSMENT
58 years old male with h/o HTN, HLD, HIV on HAART ( CD 4 608, VL undetectable on 2/12/24), h/o jared Ca (s/p chemo, radio therapy, surgery, colostomy 03/2022), prostate Ca s/p radiotherapy, ureteral stricture s/p stent ( most recent exchange with Dr Mathew on 12/13/2023), recent admission with UTI ( urine culture with pseudomonas)  present to ED with complain of dysuria, suprapubic pain. Patient was admitted in 01/2024 for pseudomonas UTI, treated with IV cefepime x 7 days with improvement. 5 days after discharge, patient noted to have dysuria again. Seen by urology, intially started on Augmentin which was later switched to ciprofloxacin x 7 days on 2/12/24 after seeing ID in clinic  CT abd/pelvis with bilateral ureteral stents. Stable bilateral moderate to severe hydronephrosis without obstructing focus. 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 He reports getting better last admission when he received antibiotics.   Would treat with either cefepime or Zosyn. and since Zosyn was started already can continue  follow repeat cultures   although the metal stent he has may have a lower risk of infection any foreign material can become infected and we need to be cognizant of this. Chronic suppressive therapy would not work in this case as the quinolones are already resistant which would be the only oral option.   continue his ARVs, he is well controlled     2/26: no fever, RA, no wbc, Cr and LFTs ok, BCs NGTD, UC grew pseudomonas, Zosyn IV continued, pt is for b/l PCN placement today. HIV medication continued.   Attending addendum:  patient seen prior to his procedures   PCNT placement   Stent removal   continue antibiotics   hopefully these procedures will help alleviate his symptoms though there is a possibility that he will still experience discomfort   pain control   Continue ARVs    2/27: afebrile, RA, no wbc, Cr ok, BCs with no growth to date, UCs were collected yesterday during the procedure R and L, pending, pt with new b/l nephrostomy tubes now, Zosyn IV continued. Pt is scheduled for b/l stents removal possibly tomorrow. HIV medications continued.   2/28: no fever, RA, no leukocytosis, Cr ok, BCs remain with no growth to date, Left UC with gram negative rods, right UC - no growth, pt is pending urological procedure scheduled for today, b/l stents removal, Zosyn IV continued. HIV medication continued.     Suggestions:  - continue Zosyn IV  - for b/l stents removal possibly today  - follow all culture data  - pain control  - monitor pt's stools  - continue Biktarvy   - pain control     Discussed with the pt

## 2024-02-28 NOTE — PROGRESS NOTE ADULT - TIME BILLING
I have spent a total of 50 minutes to prepare to see the patient, obtaining and reviewing history, physical examination, explaining the diagnosis, prognosis and treatment plan with the patient/family/caregiver. I also have spent the time ordering studies and testing, interpreting results, medicine reconciliation, subspecialty consultation and documentation as above.
38 minutes spent on total encounter. The necessity of the time spent during the encounter on this date of service was due to:     The necessity of the time spent during the encounter on this date of service was due to:   - Ordering, reviewing, and interpreting labs, testing, and imaging.  - Independently obtaining a review of systems and performing a physical exam  - Reviewing prior hospitalization and where necessary, outpatient records.  - Reviewing consultant recommendations/communicating with consultants  - Counselling and educating patient and family regarding interpretation of aforementioned items and plan of care.    Time-based billing (NON-critical care). Total minutes spent: 38.
I have spent a total of 50 minutes to prepare to see the patient, obtaining and reviewing history, physical examination, explaining the diagnosis, prognosis and treatment plan with the patient/family/caregiver. I also have spent the time ordering studies and testing, interpreting results, medicine reconciliation, subspecialty consultation and documentation as above.

## 2024-02-28 NOTE — PROGRESS NOTE ADULT - ASSESSMENT
58M with h/o prostate CA treated with Radiation 4 years ago, subsequently had ureteral strictures requiring stents. Presently seen with b/l hydronephrosis despite current indwelling metal stents as well as recurrent pseudomonas UTI.        will need to follow up with ID concerning antibiotics plan and possible DC Nusrat   ##Complicated UTI (urinary tract infection).   ##Bilateral Hydronephrosis   For recent urine culture with pseudomonas, continue with zosyn. Bilateral PCN placed on 02/27.  - Pain control--> IV dilaudid prn for severe pain  - Continue IV zosyn.  - Urology in or tooday - ID on board    #Benign essential HTN.   ·  monitor BP and titrate BP meds.     #Hyperlipidemia, unspecified.   ·   On Zetia at home.    #HIV disease   · Compliant on HAART (CD4 608, VL undetectable on 2/12/24)  - Continue Biktarvy.    #Constipation due to opioid therapy.   · Continue lactulose and senna.    Diet: DASH NPO@MN  DVT prophylaxis: Hold for procedure   Dispo: Admit Tele   Code Status:

## 2024-02-28 NOTE — PROGRESS NOTE ADULT - SUBJECTIVE AND OBJECTIVE BOX
Patient seen and examined bedside resting comfortably.  complaints of discomfort at meatus   Denies nausea vomitin, diarrhea, fevers, chills, abd pain.        T(F): 97.6 (02-28-24 @ 19:39), Max: 98.8 (02-28-24 @ 10:25)  HR: 100 (02-28-24 @ 19:39) (76 - 101)  BP: 148/99 (02-28-24 @ 19:39) (108/77 - 152/92)  RR: 19 (02-28-24 @ 19:39) (13 - 19)  SpO2: 97% (02-28-24 @ 19:39) (95% - 98%)  Wt(kg): --  CAPILLARY BLOOD GLUCOSE          PHYSICAL EXAM:  General: NAD, alert and awake  HEENT: NCAT, EOMI, conjunctiva clear  Chest: nonlabored respirations, good inspiratory effort  Abdomen: soft, NTND.   Extremities: no pedal edema or calf tenderness noted   : bilateral PCN draining blood tinged urine, R PCN 690cc/24 hrs, LPCN 1180cc/24hr. no suprapubic tenderness or distension    LABS:                        11.1   7.36  )-----------( 320      ( 28 Feb 2024 05:20 )             34.9   02-28    143  |  112<H>  |  19  ----------------------------<  92  3.5   |  27  |  0.78    Ca    8.9      28 Feb 2024 05:20  Mg     1.9     02-28    PT/INR - ( 27 Feb 2024 05:30 )   PT: 12.8 sec;   INR: 1.07 ratio         PTT - ( 27 Feb 2024 05:30 )  PTT:31.9 sec  I&O's Detail    27 Feb 2024 07:01  -  28 Feb 2024 07:00  --------------------------------------------------------  IN:    IV PiggyBack: 100 mL    Oral Fluid: 600 mL  Total IN: 700 mL    OUT:    Nephrostomy Tube (mL): 690 mL    Nephrostomy Tube (mL): 1180 mL  Total OUT: 1870 mL    Total NET: -1170 mL          A/P: 58M with h/o prostate CA and Anal ca,  treated with Radiation 4 years ago, subsequently had ureteral strictures requiring stents.   With b/l hydronephrosis despite indwelling metal stents as well as recurrent pseudomonas UTI.  s/p B/l PCN placement by IR 2/26 now POD0 s/p cystoscopy with bilateral ureteral stent removal   -c/w zosyn per ID  -continue care per primary team  -cleared for d/c thursday from a  standpoint

## 2024-02-29 ENCOUNTER — TRANSCRIPTION ENCOUNTER (OUTPATIENT)
Age: 59
End: 2024-02-29

## 2024-02-29 VITALS
DIASTOLIC BLOOD PRESSURE: 85 MMHG | RESPIRATION RATE: 18 BRPM | OXYGEN SATURATION: 98 % | HEART RATE: 99 BPM | SYSTOLIC BLOOD PRESSURE: 120 MMHG | TEMPERATURE: 98 F

## 2024-02-29 LAB
ANION GAP SERPL CALC-SCNC: 4 MMOL/L — LOW (ref 5–17)
BUN SERPL-MCNC: 23 MG/DL — SIGNIFICANT CHANGE UP (ref 7–23)
CALCIUM SERPL-MCNC: 9 MG/DL — SIGNIFICANT CHANGE UP (ref 8.5–10.1)
CHLORIDE SERPL-SCNC: 112 MMOL/L — HIGH (ref 96–108)
CO2 SERPL-SCNC: 27 MMOL/L — SIGNIFICANT CHANGE UP (ref 22–31)
CREAT SERPL-MCNC: 0.84 MG/DL — SIGNIFICANT CHANGE UP (ref 0.5–1.3)
CULTURE RESULTS: NO GROWTH — SIGNIFICANT CHANGE UP
EGFR: 102 ML/MIN/1.73M2 — SIGNIFICANT CHANGE UP
GLUCOSE SERPL-MCNC: 107 MG/DL — HIGH (ref 70–99)
HCT VFR BLD CALC: 34.3 % — LOW (ref 39–50)
HGB BLD-MCNC: 11.2 G/DL — LOW (ref 13–17)
MAGNESIUM SERPL-MCNC: 1.9 MG/DL — SIGNIFICANT CHANGE UP (ref 1.6–2.6)
MCHC RBC-ENTMCNC: 28.6 PG — SIGNIFICANT CHANGE UP (ref 27–34)
MCHC RBC-ENTMCNC: 32.7 G/DL — SIGNIFICANT CHANGE UP (ref 32–36)
MCV RBC AUTO: 87.5 FL — SIGNIFICANT CHANGE UP (ref 80–100)
NRBC # BLD: 0 /100 WBCS — SIGNIFICANT CHANGE UP (ref 0–0)
PHOSPHATE SERPL-MCNC: 3.1 MG/DL — SIGNIFICANT CHANGE UP (ref 2.5–4.5)
PLATELET # BLD AUTO: 303 K/UL — SIGNIFICANT CHANGE UP (ref 150–400)
POTASSIUM SERPL-MCNC: 4 MMOL/L — SIGNIFICANT CHANGE UP (ref 3.5–5.3)
POTASSIUM SERPL-SCNC: 4 MMOL/L — SIGNIFICANT CHANGE UP (ref 3.5–5.3)
RBC # BLD: 3.92 M/UL — LOW (ref 4.2–5.8)
RBC # FLD: 14.2 % — SIGNIFICANT CHANGE UP (ref 10.3–14.5)
SODIUM SERPL-SCNC: 143 MMOL/L — SIGNIFICANT CHANGE UP (ref 135–145)
SPECIMEN SOURCE: SIGNIFICANT CHANGE UP
WBC # BLD: 6.19 K/UL — SIGNIFICANT CHANGE UP (ref 3.8–10.5)
WBC # FLD AUTO: 6.19 K/UL — SIGNIFICANT CHANGE UP (ref 3.8–10.5)

## 2024-02-29 PROCEDURE — 99239 HOSP IP/OBS DSCHRG MGMT >30: CPT

## 2024-02-29 PROCEDURE — 99232 SBSQ HOSP IP/OBS MODERATE 35: CPT

## 2024-02-29 RX ORDER — OXYCODONE AND ACETAMINOPHEN 5; 325 MG/1; MG/1
2 TABLET ORAL
Qty: 30 | Refills: 0
Start: 2024-02-29 | End: 2024-03-04

## 2024-02-29 RX ORDER — CIPROFLOXACIN LACTATE 400MG/40ML
1 VIAL (ML) INTRAVENOUS
Qty: 10 | Refills: 0
Start: 2024-02-29 | End: 2024-03-09

## 2024-02-29 RX ORDER — PHENAZOPYRIDINE HCL 100 MG
1 TABLET ORAL
Qty: 60 | Refills: 0
Start: 2024-02-29 | End: 2024-03-29

## 2024-02-29 RX ADMIN — Medication 240 MILLIGRAM(S): at 05:14

## 2024-02-29 RX ADMIN — PANTOPRAZOLE SODIUM 40 MILLIGRAM(S): 20 TABLET, DELAYED RELEASE ORAL at 05:14

## 2024-02-29 RX ADMIN — PIPERACILLIN AND TAZOBACTAM 25 GRAM(S): 4; .5 INJECTION, POWDER, LYOPHILIZED, FOR SOLUTION INTRAVENOUS at 05:14

## 2024-02-29 RX ADMIN — Medication 100 MILLIGRAM(S): at 05:13

## 2024-02-29 RX ADMIN — BICTEGRAVIR SODIUM, EMTRICITABINE, AND TENOFOVIR ALAFENAMIDE FUMARATE 1 TABLET(S): 30; 120; 15 TABLET ORAL at 11:19

## 2024-02-29 RX ADMIN — HYDROMORPHONE HYDROCHLORIDE 1 MILLIGRAM(S): 2 INJECTION INTRAMUSCULAR; INTRAVENOUS; SUBCUTANEOUS at 09:50

## 2024-02-29 RX ADMIN — HYDROMORPHONE HYDROCHLORIDE 1 MILLIGRAM(S): 2 INJECTION INTRAMUSCULAR; INTRAVENOUS; SUBCUTANEOUS at 05:26

## 2024-02-29 RX ADMIN — HYDROMORPHONE HYDROCHLORIDE 1 MILLIGRAM(S): 2 INJECTION INTRAMUSCULAR; INTRAVENOUS; SUBCUTANEOUS at 10:51

## 2024-02-29 NOTE — PROGRESS NOTE ADULT - NS ATTEND AMEND GEN_ALL_CORE FT
I have reviewed all pertinent clinical information and agree with the NP's note.  Labs, new imaging (if applicable) and vitals reviewed.  Agree with the above assessment and plan.  ok to d/c home off antibiotics   follow up with Dr Gordon for his HIV and she can help manage his urinary issues as well if a new infection arises     Discussed with Dr Mayco Jones, DO  Chief, Infectious Disease at [facility]  Reachable via [employer] Teams or ID office: [phone]  Weekdays: After 5pm, please call [phone] for all inquiries and new consults  Weekends: Message on-call infectious disease physician via teams (see Sandy)
I have reviewed all pertinent clinical information and agree with the NP's note.  Labs, new imaging (if applicable) and vitals reviewed.  Agree with the above assessment and plan.    continue antibiotics   urologic procedire this week    El Jones DO  Chief, Infectious Disease at [facility]  Reachable via Microsoft Teams or ID office: [phone]  Weekdays: After 5pm, please call [phone] for all inquiries and new consults  Weekends: Message on-call infectious disease physician via teams (josephine Delgado)
I have reviewed all pertinent clinical information and agree with the NP's note.  Labs, new imaging (if applicable) and vitals reviewed.  Agree with the above assessment and plan.    patient seen prior to his procedures   PCNT placement   Stent removal   continue antibiotics   hopefully these procedures will help alleviate his symptoms though there is a possibility that he will still experience discomfort   pain control   Continue ARVs      El Jones, DO  Chief, Infectious Disease at [facility]  Reachable via Microsoft Teams or ID office: [phone]  Weekdays: After 5pm, please call [phone] for all inquiries and new consults  Weekends: Message on-call infectious disease physician via teams (see Sandy)
I have reviewed all pertinent clinical information and agree with the NP's note.  Labs, new imaging (if applicable) and vitals reviewed.  Agree with the above assessment and plan.    continue antibiotics   pending stent removal   pain control     Discussed with Dr. Mayco Jones, DO  Chief, Infectious Disease at [facility]  Reachable via Microsoft Teams or ID office: [phone]  Weekdays: After 5pm, please call [phone] for all inquiries and new consults  Weekends: Message on-call infectious disease physician via teams (see Sandy)
Spoke w pt who stated pcn's hurt this time.  Both are draining well now  Placed for persistent pseudomonas infection, symptomatic, and w persistent hydro despite well positioned ureteral stents    To be removed tomorrow  He is aware    Site of PCN's looked fine  Many reasons he might have pain: infection, new PCN's hydro etc.    He rec'd a dose of pain meds.    continue to monitor  Abx per ID

## 2024-02-29 NOTE — DISCHARGE NOTE PROVIDER - NSDCCPCAREPLAN_GEN_ALL_CORE_FT
PRINCIPAL DISCHARGE DIAGNOSIS  Diagnosis: Pyelonephritis  Assessment and Plan of Treatment:       SECONDARY DISCHARGE DIAGNOSES  Diagnosis: Complicated UTI (urinary tract infection)  Assessment and Plan of Treatment:     Diagnosis: Hyperlipidemia, unspecified  Assessment and Plan of Treatment:     Diagnosis: Constipation due to opioid therapy  Assessment and Plan of Treatment:     Diagnosis: Ureteral stent present  Assessment and Plan of Treatment:      PRINCIPAL DISCHARGE DIAGNOSIS  Diagnosis: Pyelonephritis  Assessment and Plan of Treatment:       SECONDARY DISCHARGE DIAGNOSES  Diagnosis: Sepsis due to Pseudomonas  Assessment and Plan of Treatment:     Diagnosis: Complicated UTI (urinary tract infection)  Assessment and Plan of Treatment:     Diagnosis: Hyperlipidemia, unspecified  Assessment and Plan of Treatment:     Diagnosis: Constipation due to opioid therapy  Assessment and Plan of Treatment:     Diagnosis: Ureteral stent present  Assessment and Plan of Treatment:

## 2024-02-29 NOTE — PROGRESS NOTE ADULT - ASSESSMENT
58M with h/o prostate CA and Anal ca, treated with Radiation 4 years ago, subsequently had ureteral strictures requiring stents.   With b/l hydronephrosis despite indwelling metal stents as well as recurrent pseudomonas UTI.  s/p B/l PCN placement by IR 2/26 now POD1 s/p cystoscopy with bilateral ureteral stent removal     -c/w zosyn per ID  -continue care per primary team  - Follow up Stent Cx  -cleared for D/C from a  standpoint.

## 2024-02-29 NOTE — DISCHARGE NOTE NURSING/CASE MANAGEMENT/SOCIAL WORK - PATIENT PORTAL LINK FT
You can access the FollowMyHealth Patient Portal offered by [facility] by registering at the following website: [email or web address]. By joining [facility]’s FollowMyHealth portal, you will also be able to view your health information using other applications (apps) compatible with our system.

## 2024-02-29 NOTE — DISCHARGE NOTE PROVIDER - CARE PROVIDER_API CALL
Ravi Mathew  Urology  [address]  [place] [id number]  Phone: [phone]  Fax: [phone]  Follow Up Time:

## 2024-02-29 NOTE — PROGRESS NOTE ADULT - SUBJECTIVE AND OBJECTIVE BOX
Patient seen and examined bedside   No complaints offered.   Denies nausea and vomiting. Tolerating diet.  Denies chest pain, dyspnea, cough.    T(F): 97.7 (02-29-24 @ 09:54), Max: 99.3 (02-28-24 @ 21:35)  HR: 99 (02-29-24 @ 09:54) (74 - 101)  BP: 120/85 (02-29-24 @ 09:54) (117/95 - 152/92)  RR: 18 (02-29-24 @ 09:54) (13 - 19)  SpO2: 98% (02-29-24 @ 09:54) (94% - 100%)  Wt(kg): --  CAPILLARY BLOOD GLUCOSE          PHYSICAL EXAM:  General: NAD, alert and awake  HEENT: NCAT, EOMI, conjunctiva clear  Chest: nonlabored respirations, good inspiratory effort  Abdomen: soft, NTND.   Extremities: no pedal edema or calf tenderness noted   : bilateral PCN draining blood tinged urine, No suprapubic tenderness or distension    LABS:                        11.2   6.19  )-----------( 303      ( 29 Feb 2024 06:06 )             34.3   02-29    143  |  112<H>  |  23  ----------------------------<  107<H>  4.0   |  27  |  0.84    Ca    9.0      29 Feb 2024 06:06  Phos  3.1     02-29  Mg     1.9     02-29      I&O's Detail    28 Feb 2024 07:01  -  29 Feb 2024 07:00  --------------------------------------------------------  IN:  Total IN: 0 mL    OUT:    Nephrostomy Tube (mL): 300 mL    Nephrostomy Tube (mL): 400 mL  Total OUT: 700 mL    Total NET: -700 mL     Patient seen and examined bedside   No complaints offered.   Denies nausea and vomiting. Tolerating diet.  Denies chest pain, dyspnea, cough.    T(F): 97.7 (02-29-24 @ 09:54), Max: 99.3 (02-28-24 @ 21:35)  HR: 99 (02-29-24 @ 09:54) (74 - 101)  BP: 120/85 (02-29-24 @ 09:54) (117/95 - 152/92)  RR: 18 (02-29-24 @ 09:54) (13 - 19)  SpO2: 98% (02-29-24 @ 09:54) (94% - 100%)  Wt(kg): --  CAPILLARY BLOOD GLUCOSE          PHYSICAL EXAM:  General: NAD, alert and awake  HEENT: NCAT, EOMI, conjunctiva clear  Chest: nonlabored respirations, good inspiratory effort  Abdomen: soft, NTND. Ostomy  Extremities: no pedal edema or calf tenderness noted   : bilateral PCN draining blood tinged urine, No suprapubic tenderness or distension    LABS:                        11.2   6.19  )-----------( 303      ( 29 Feb 2024 06:06 )             34.3   02-29    143  |  112<H>  |  23  ----------------------------<  107<H>  4.0   |  27  |  0.84    Ca    9.0      29 Feb 2024 06:06  Phos  3.1     02-29  Mg     1.9     02-29      I&O's Detail    28 Feb 2024 07:01  -  29 Feb 2024 07:00  --------------------------------------------------------  IN:  Total IN: 0 mL    OUT:    Nephrostomy Tube (mL): 300 mL    Nephrostomy Tube (mL): 400 mL  Total OUT: 700 mL    Total NET: -700 mL

## 2024-02-29 NOTE — PROGRESS NOTE ADULT - SUBJECTIVE AND OBJECTIVE BOX
CARL ROWLEY  MRN-[id number]    Follow Up:  recurrent uti, hiv    Interval History: the pt was seen and examined earlier, not in acute distress, awake and alert, eager to go home.     PAST MEDICAL & SURGICAL HISTORY:  HTN (hypertension)      HLD (hyperlipidemia)      HIV infection      Anxiety      BPH (benign prostatic hyperplasia)      History of anal cancer      Anal lesion      S/P colostomy      History of gastroscopy      Ureteral stent present          ROS:    [ ] Unobtainable because:  [x ] All other systems negative    Constitutional: no fever, no chills  Head: no trauma  Eyes: no vision changes, no eye pain  ENT:  no sore throat, no rhinorrhea  Cardiovascular:  no chest pain, no palpitation  Respiratory:  no SOB, no cough  GI:  no abd pain, no vomiting, no diarrhea  urinary: mild pain at nephrostomy tubes present   musculoskeletal:  no joint pain, no joint swelling  skin:  no rash  neurology:  no headache, no seizure, no change in mental status  psych: no anxiety, no depression         Allergies  No Known Allergies        ANTIMICROBIALS:  bictegravir 50 mG/emtricitabine 200 mG/tenofovir alafenamide 25 mG (BIKTARVY) 1 daily  piperacillin/tazobactam IVPB.. 3.375 every 8 hours      OTHER MEDS:  acetaminophen     Tablet .. 650 milliGRAM(s) Oral every 6 hours PRN  ALPRAZolam 1 milliGRAM(s) Oral every 6 hours PRN  diltiazem    milliGRAM(s) Oral daily  HYDROmorphone  Injectable 1 milliGRAM(s) IV Push every 4 hours PRN  lactulose Syrup 10 Gram(s) Oral daily  metoprolol succinate  milliGRAM(s) Oral daily  ondansetron Injectable 4 milliGRAM(s) IV Push every 8 hours PRN  oxyCODONE    IR 10 milliGRAM(s) Oral every 6 hours PRN  pantoprazole    Tablet 40 milliGRAM(s) Oral before breakfast  senna 2 Tablet(s) Oral at bedtime  tamsulosin 0.4 milliGRAM(s) Oral at bedtime  zolpidem 5 milliGRAM(s) Oral at bedtime PRN  zolpidem 5 milliGRAM(s) Oral at bedtime PRN      Vital Signs Last 24 Hrs  T(C): 36.5 (29 Feb 2024 09:54), Max: 37.4 (28 Feb 2024 21:35)  T(F): 97.7 (29 Feb 2024 09:54), Max: 99.3 (28 Feb 2024 21:35)  HR: 99 (29 Feb 2024 09:54) (74 - 101)  BP: 120/85 (29 Feb 2024 09:54) (117/95 - 152/92)  BP(mean): 115 (28 Feb 2024 19:39) (115 - 115)  RR: 18 (29 Feb 2024 09:54) (13 - 19)  SpO2: 98% (29 Feb 2024 09:54) (94% - 100%)    Parameters below as of 29 Feb 2024 04:49  Patient On (Oxygen Delivery Method): room air        Physical Exam:  Constitutional: non-toxic, no distress, RA  HEAD/EYES: anicteric, no conjunctival injection  ENT:  supple, no thrush  Cardiovascular:   normal S1, S2, no murmur, no edema  Respiratory:  clear BS bilaterally, no wheezes, no rales  GI:  soft, non-tender, normal bowel sounds, + colostomy with small amount of brown stool in the bag   :  b/l nephrostomy tubes: left bag with bloody urine, right bag with yellow urine   Musculoskeletal:  no synovitis, normal ROM  Neurologic: awake and alert, normal strength, no focal findings  Skin:  no rash, no erythema, no phlebitis  Heme/Onc: no lymphadenopathy   Psychiatric:  awake, alert, appropriate mood    WBC Count: 6.19 K/uL (02-29 @ 06:06)  WBC Count: 7.36 K/uL (02-28 @ 05:20)  WBC Count: 8.86 K/uL (02-27 @ 05:30)  WBC Count: 5.52 K/uL (02-26 @ 06:40)  WBC Count: 5.55 K/uL (02-23 @ 06:34)                            11.2   6.19  )-----------( 303      ( 29 Feb 2024 06:06 )             34.3       02-29    143  |  112<H>  |  23  ----------------------------<  107<H>  4.0   |  27  |  0.84    Ca    9.0      29 Feb 2024 06:06  Phos  3.1     02-29  Mg     1.9     02-29        Urinalysis Basic - ( 29 Feb 2024 06:06 )    Color: x / Appearance: x / SG: x / pH: x  Gluc: 107 mg/dL / Ketone: x  / Bili: x / Urobili: x   Blood: x / Protein: x / Nitrite: x   Leuk Esterase: x / RBC: x / WBC x   Sq Epi: x / Non Sq Epi: x / Bacteria: x        Creatinine Trend: 0.84<--, 0.78<--, 1.13<--, 1.06<--, 1.08<--, 1.06<--      MICROBIOLOGY:  v  Kidney Nephrostomy - Right  02-26-24   No growth  --  --      Kidney Nephrostomy - Left  02-26-24   >100,000 CFU/ml Pseudomonas aeruginosa Susceptibility to follow.  --  --      .Blood Blood-Peripheral  02-22-24   No growth at 5 days  --  Pseudomonas aeruginosa      .Urine  02-12-24   >100,000 CFU/ml Pseudomonas aeruginosa  --  Pseudomonas aeruginosa        HIV-1 RNA Quantitative, Viral Load Log: NOT DET. lg /mL (02-12-24 @ 09:46)  HIV-1 RNA Quantitative, Viral Load Log: NOT DET. lg /mL (12-11-23 @ 21:15)  HIV-1 RNA Quantitative, Viral Load Log: NOT DET. lg /mL (08-10-23 @ 14:55)  HIV-1 RNA Quantitative, Viral Load Log: NOT DET. lg /mL (07-23-23 @ 07:36)  HIV-1 RNA Quantitative, Viral Load Log: NOT DET. lg /mL (05-11-23 @ 15:18)      SARS-CoV-2: NotDetec (06 Oct 2023 02:40)  SARS-CoV-2: NotDetec (13 Sep 2023 21:20)    RADIOLOGY:

## 2024-02-29 NOTE — PROGRESS NOTE ADULT - REASON FOR ADMISSION
complicated UTI due to pseudomonas

## 2024-02-29 NOTE — PROGRESS NOTE ADULT - ASSESSMENT
58 years old male with h/o HTN, HLD, HIV on HAART ( CD 4 608, VL undetectable on 2/12/24), h/o jared Ca (s/p chemo, radio therapy, surgery, colostomy 03/2022), prostate Ca s/p radiotherapy, ureteral stricture s/p stent ( most recent exchange with Dr Mathew on 12/13/2023), recent admission with UTI ( urine culture with pseudomonas)  present to ED with complain of dysuria, suprapubic pain. Patient was admitted in 01/2024 for pseudomonas UTI, treated with IV cefepime x 7 days with improvement. 5 days after discharge, patient noted to have dysuria again. Seen by urology, intially started on Augmentin which was later switched to ciprofloxacin x 7 days on 2/12/24 after seeing ID in clinic  CT abd/pelvis with bilateral ureteral stents. Stable bilateral moderate to severe hydronephrosis without obstructing focus. 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 He reports getting better last admission when he received antibiotics.   Would treat with either cefepime or Zosyn. and since Zosyn was started already can continue  follow repeat cultures   although the metal stent he has may have a lower risk of infection any foreign material can become infected and we need to be cognizant of this. Chronic suppressive therapy would not work in this case as the quinolones are already resistant which would be the only oral option.   continue his ARVs, he is well controlled     2/26: no fever, RA, no wbc, Cr and LFTs ok, BCs NGTD, UC grew pseudomonas, Zosyn IV continued, pt is for b/l PCN placement today. HIV medication continued.   Attending addendum:  patient seen prior to his procedures   PCNT placement   Stent removal   continue antibiotics   hopefully these procedures will help alleviate his symptoms though there is a possibility that he will still experience discomfort   pain control   Continue ARVs    2/27: afebrile, RA, no wbc, Cr ok, BCs with no growth to date, UCs were collected yesterday during the procedure R and L, pending, pt with new b/l nephrostomy tubes now, Zosyn IV continued. Pt is scheduled for b/l stents removal possibly tomorrow. HIV medications continued.   2/28: no fever, RA, no leukocytosis, Cr ok, BCs remain with no growth to date, Left UC with gram negative rods, right UC - no growth, pt is pending urological procedure scheduled for today, b/l stents removal, Zosyn IV continued. HIV medication continued.   Attending addendum:  continue antibiotics   pending stent removal   pain control     2/29: afebrile, RA, no wbc, Cr ok, Lt kidney UC grew pseudomonas, Rt Kidney UC with no growth to date, pt is s/p cysto and adjustment of ureteric stent yesterday, no objections to stopping abx and discharge off abx if medically stable with urology follow up.     Suggestions:  - stop abx   - follow all culture data  - pain control  - continue Biktarvy   - pain control     Discussed with Dr. Jones

## 2024-02-29 NOTE — CHART NOTE - NSCHARTNOTEFT_GEN_A_CORE
Patient seen for length of stay nutrition risk rescreening. Patient has been screened for and presents negative for    -Pressure ulcers stage 2 or greater  -Enteral or Parenteral Nutrition  -BMI <18  -SoxxhsyxdrF7A >8  -Chewing/Swallowing Difficulty  -Decreased appetite  -Unintentional Weight Loss  -Inadequate diet (i.e. NPO/Clear Liquids)    No intervention required at this time. RD remains available. Pt seen on medical floor, adm w/ complicated UTI due to pseudomonas , prostate Ca / Anal Ca treated w/ RT 4 years ago. POD # 0. Cystoscopy w/ bilateral ureteral stent removed. Pt appears WDWN, consuming 75 - 100 % meals. No c/o at this time. Denies N/V/D/C/Chewing/Swallowing issues/ weight loss, No food allergies.
Kwesi Cardona | Reference #: [id number]    Practitioner Count: 3  Pharmacy Count: 2  Current Opioid Prescriptions: 0  Current Benzodiazepine Prescriptions: 0  Current Stimulant Prescriptions: 0      Patient Demographic Information (PDI)       PDI	First Name	Last Name	Birth Date	Gender	Street Address	[facility]	Zip Code  A	David Turcios	1965	Unknown	[address] [facility]	[address]  B	David Turcios	1965	Male	[address] [facility] [address]	[facility]	[address]  C	David Turcios	1965	Male	[address] [facility]	[id number]    Prescription Information      PDI Filter:    PDI	My Rx	Current Rx	Drug Type	Rx Written	Rx Dispensed	Drug	Quantity	Days Supply	Prescriber Name	Prescriber NEFTALY #	Payment Method	Dispenser  C	N	Y		09/26/2023	02/08/2024	zolpidem tartrate 10 mg tablet	30	30	Annamaria Hanh	[id number]	Medicaid	[id number]  A	N	N		02/01/2023	01/27/2024	vireo whole flower 0.0 mg - 1mg thc:<0.5mg cbd/inh	2	6	Naeem Tucker (MSN)	[id number]	[facility]  A	N	N		02/01/2023	01/27/2024	vireo whole flower 0.0 mg - 1mg thc:<0.5mg cbd/inh	2	6	Naeem Tucker (MSN)	[id number]	[facility]  A	N	N		02/01/2023	01/27/2024	vireo whole flower 0.0 mg - 1mg thc:<0.5mg cbd/inh	1	3	Naeem Tucker (MSN)	[id number]	[facility]  A	N	N		02/01/2023	01/27/2024	vireo whole flower 0.0 mg - 1mg thc:<0.5mg cbd/inh	1	3	Naeem Tucker (MSN)	[id number]	[facility]  A	N	N		02/01/2023	01/27/2024	vireo whole flower 0.0 mg - 1mg thc:<0.5mg cbd/inh	1	3	Naeem Tucker (MSN)	[id number]	[facility]  A	N	N		02/01/2023	01/27/2024	vireo yellow (6:1) 5.1 thc and 0.9mg cbd/0.25ml oral sol	1	7	Naeem Tucker (MSN)	[id number]	[facility]  C	N	N		09/26/2023	01/04/2024	zolpidem tartrate 10 mg tablet	30	30	Hanh Yin	[id number]	Medicaid	[id number]  C	N	N	O	12/20/2023	12/23/2023	morphine sulf er 15 mg tablet	120	30	Tasha Perez	[id number]	Medicaid	[id number]  C	N	N	O	12/20/2023	12/20/2023	oxycodone hcl (ir) 10 mg tab	90	15	Tasha Perez	[id number]	Medicaid	[id number]  C	N	N	B	12/20/2023	12/20/2023	alprazolam 1 mg tablet	120	30	Hanh Yin	[id number]	Medicaid	[id number]  C	N	N		09/26/2023	12/04/2023	zolpidem tartrate 10 mg tablet	30	30	AnnamariaHanh medina	[id number]	Medicaid	[id number]  C	N	N	O	11/06/2023	11/15/2023	morphine sulf er 15 mg tablet	90	30	Patricia Colon)	[id number]	Medicaid	[id number]  C	N	N	B	11/14/2023	11/14/2023	alprazolam 1 mg tablet	120	30	Hanh Yin	[id number]	Medicaid	[id number]  C	N	N	O	11/06/2023	11/09/2023	oxycodone hcl (ir) 10 mg tab	90	15	Patricia Colon)	[id number]	Medicaid	[id number]  C	N	N	O	10/26/2023	10/27/2023	oxycodone hcl (ir) 10 mg tab	60	10	Patricia Colon)	[id number]	Medicaid	[id number]  C	N	N		09/26/2023	10/24/2023	zolpidem tartrate 10 mg tablet	30	30	Hanh Yin	[id number]	Medicaid	[id number]  C	N	N	O	10/17/2023	10/17/2023	oxycodone hcl (ir) 5 mg tablet	42	7	Jarad Schwartz	[id number]	Medicaid	[id number]  C	N	N		09/28/2023	10/01/2023	pregabalin 150 mg capsule	60	30	Nick Reyes MD	[id number]	Medicaid	[id number]  C	N	N		09/26/2023	09/27/2023	zolpidem tartrate 10 mg tablet	30	30	Hanh Yin	[id number]	Medicaid	[id number]  C	N	N	B	09/26/2023	09/26/2023	alprazolam 1 mg tablet	120	30	Hanh Yin	[id number]	Medicaid	[id number]  C	N	N	O	09/14/2023	09/14/2023	oxycodone hcl (ir) 5 mg tablet	6	2	Nick Mendes (MD)	[id number]	Medicaid	[id number]  C	N	N		03/22/2023	08/24/2023	zolpidem tartrate 10 mg tablet	30	30	Hanh Yin	[id number]	Medicaid	[id number]  C	N	N	B	08/17/2023	08/18/2023	alprazolam 1 mg tablet	120	30	Hanh Yin	[id number]	Medicaid	[id number]  B	N	N		08/15/2023	08/15/2023	pregabalin 75 mg capsule	60	30	Nick Reyes MD	[id number]	Medicaid	[facility]  B	N	N	O	08/07/2023	08/07/2023	oxycodone-acetaminophen 5-325 mg tablet	42	14	Andres Smith	[id number]	Medicaid	[facility]  C	N	N		02/16/2023	07/24/2023	zolpidem tartrate 10 mg tablet	30	30	Hanh Yin	[id number]	Medicaid	[id number]  C	N	N		12/28/2022	06/15/2023	zolpidem tartrate 10 mg tablet	30	30	Hanh Yin	[id number]	Medicaid	[id number]  C	N	N	B	06/13/2023	06/13/2023	alprazolam 1 mg tablet	120	30	AnnamariaHanh	[id number]	Medicaid	[id number]  C	N	N	O	06/09/2023	06/09/2023	tramadol hcl 50 mg tablet	30	30	Jarad Schwartz	[id number]	Medicaid	[id number]  C	N	N	O	05/19/2023	05/19/2023	oxycodone-acetaminophen 5-325 mg tablet	9	3	Jarad Schwartz	[id number]	Medicaid	[id number]  C	N	N		03/22/2023	05/17/2023	zolpidem tartrate 10 mg tablet	30	30	AnnamariaHanh	[id number]	Medicaid	[id number]  C	N	N	B	05/05/2023	05/05/2023	alprazolam 1 mg tablet	120	30	Annamaria Hanh	[id number]	Medicaid	[id number]  C	N	N		03/22/2023	04/19/2023	zolpidem tartrate 10 mg tablet	30	30	Annamaria Hanh	[id number]	Medicaid	[id number]  C	N	N		03/22/2023	03/22/2023	zolpidem tartrate 10 mg tablet	30	30	Annamaria Hanh	[id number]	Medicaid	[id number]  C	N	N	B	02/16/2023	03/05/2023	alprazolam 1 mg tablet	120	30	Annamaria Hanh	[id number]	Medicaid	[id number]  A	N	N		02/01/2023	03/03/2023	vireo whole flower 0.0 mg - 1mg thc:<0.5mg cbd/inh	1	3	Naeem Tucker (MSN)	[id number]	[facility]  A	N	N		02/01/2023	03/03/2023	vireo whole flower 0.0 mg - 1mg thc:<0.5mg cbd/inh	1	3	Naeem Tucker (MSN)	[id number]	[facility]  A	N	N		02/01/2023	03/03/2023	vireo whole flower 0.0 mg - 1mg thc:<0.5mg cbd/inh	1	3	Naeem Tucker (MSN)	[id number]	[facility]  A	N	N		02/01/2023	03/03/2023	vireo whole flower 0.0 mg - 1mg thc:<0.5mg cbd/inh	1	3	Naeem Tucker (MSN)	[id number]	[place]	[facility]

## 2024-02-29 NOTE — DISCHARGE NOTE PROVIDER - NSDCFUSCHEDAPPT_GEN_ALL_CORE_FT
Patricia Colon  [facility] Physician [facility]  GERIATRICS [address]   Scheduled Appointment: 03/04/2024    Denise Gordon  [facility] Physician [facility]  INFDISEASE [address]  Scheduled Appointment: 03/14/2024    Jarad Schwartz  [facility] Physician [facility]  INTMED [address]  Scheduled Appointment: 04/18/2024    Noreen Sapp  [facility] Physician [facility]  PODIATRY [address] [facility]  Scheduled Appointment: 05/17/2024     Denise Gordon  [facility] Physician [facility]  INFDISEASE [address]  Scheduled Appointment: 03/14/2024    Ravi Mathew  [facility] Physician [facility]  UROLOGY [address]  Scheduled Appointment: 03/15/2024    Kenia Turner  [facility] Physician [facility]  NEUROLOGY [address] [facility]  Scheduled Appointment: 03/19/2024    Noreen Sapp  [facility] Physician [facility]  PODIATRY [address] [facility]  Scheduled Appointment: 05/17/2024    Robb Gonzalez  [facility] Physician [facility]  HEARTVASC [address] [facility]  Scheduled Appointment: 06/05/2024

## 2024-02-29 NOTE — DISCHARGE NOTE NURSING/CASE MANAGEMENT/SOCIAL WORK - NSDCPEFALRISK_GEN_ALL_CORE
For information on Fall & Injury Prevention, visit: [email or web address] OR  [email or web address] OR  https://www.cdc.gov/steadi/patient.html

## 2024-02-29 NOTE — DISCHARGE NOTE PROVIDER - NSDCMRMEDTOKEN_GEN_ALL_CORE_FT
ALPRAZolam 1 mg oral tablet: 1 tab(s) orally every 6 hours as needed for  anxiety  Ambien 10 mg oral tablet: 1 tab(s) orally once a day (at bedtime)  bictegravir/emtricitabine/tenofovir 50 mg-200 mg-25 mg oral tablet: 1 tab(s) orally once a day  cholecalciferol 1250 mcg (50,000 intl units) oral capsule: 1 cap(s) orally once a week on Sunday  ciclopirox 8% topical solution: Apply topically to affected area . Remove with alcohol every 7 days.  DilTIAZem (Eqv-Cardizem CD) 240 mg/24 hours oral capsule, extended release: 1 cap(s) orally once a day  ezetimibe 10 mg oral tablet: 1 tab(s) orally once a day  furosemide 40 mg oral tablet: 1 tab(s) orally once a day  lactulose 10 g/15 mL oral syrup: 15 milliliter(s) orally once a day  metoprolol succinate 100 mg oral tablet, extended release: 1 tab(s) orally once a day  morphine 15 mg oral tablet: 1 tab(s) orally every 6 hours as needed for  severe pain  OLANZapine 10 mg oral tablet: 1 tab(s) orally once a day (at bedtime)  omeprazole 40 mg oral delayed release capsule: 1 cap(s) orally once a day  senna (sennosides) 8.6 mg oral tablet: 2 tab(s) orally once a day (at bedtime)  tamsulosin 0.4 mg oral capsule: 1 cap(s) orally once a day   ALPRAZolam 1 mg oral tablet: 1 tab(s) orally every 6 hours as needed for  anxiety  Ambien 10 mg oral tablet: 1 tab(s) orally once a day (at bedtime)  bictegravir/emtricitabine/tenofovir 50 mg-200 mg-25 mg oral tablet: 1 tab(s) orally once a day  cholecalciferol 1250 mcg (50,000 intl units) oral capsule: 1 cap(s) orally once a week on Sunday  ciclopirox 8% topical solution: Apply topically to affected area . Remove with alcohol every 7 days.  Cipro 500 mg oral tablet: 1 tab(s) orally once a day  DilTIAZem (Eqv-Cardizem CD) 240 mg/24 hours oral capsule, extended release: 1 cap(s) orally once a day  ezetimibe 10 mg oral tablet: 1 tab(s) orally once a day  furosemide 40 mg oral tablet: 1 tab(s) orally once a day  lactulose 10 g/15 mL oral syrup: 15 milliliter(s) orally once a day  metoprolol succinate 100 mg oral tablet, extended release: 1 tab(s) orally once a day  morphine 15 mg oral tablet: 1 tab(s) orally every 6 hours as needed for  severe pain  OLANZapine 10 mg oral tablet: 1 tab(s) orally once a day (at bedtime)  omeprazole 40 mg oral delayed release capsule: 1 cap(s) orally once a day  Percocet 5 mg-325 mg oral tablet: 2 tab(s) orally every 8 hours MDD: 6 tabs  Pyridium 200 mg oral tablet: 1 tab(s) orally 2 times a day  senna (sennosides) 8.6 mg oral tablet: 2 tab(s) orally once a day (at bedtime)  tamsulosin 0.4 mg oral capsule: 1 cap(s) orally once a day

## 2024-02-29 NOTE — DISCHARGE NOTE PROVIDER - HOSPITAL COURSE
HPI:  58 years old male with h/o HTN, HLD, HIV on HAART ( CD 4 608, VL undetectable on 2/12/24), h/o jared Ca (s/p chemo, radio therapy, surgery, colostomy 03/2022), prostate Ca s/p radiotherapy, ureteral stricture s/p stent ( most recent exchange with Dr Mathew on 12/13/2023), recent admission with UTI ( urine culture with pseudomonas)  present to ED with complain of dysuria, suprapubic pain. Patient was admitted in 01/2024 for pseudomonas UTI, treated with IV cefepime x 7 days with improvement. 5 days after discharge, patient noted to have dysuria again. Seen by urology, intially started on Augmentin which was later switched to ciprofloxacin x 7 days on 2/12/24 after seeing ID in clinic. Patient also received Bicillin L-A IM in ID clinic as well. No fever or chills.   Hemodynamically stable, afebrile, sat well at RA. No leukocytosis, plt 355, K 4.1, Cr 1.15, lactate 2.3. UA dirty. CT abd/pelvis with bilateral ureteral stents. Stable bilateral moderate to severe hydronephrosis without obstructing focus. Bilateral periureteral and perivesicular stranding may be reactive and/or reflect UTI. Lingular consolidation      SH: no toxic habits  FH: HTN (22 Feb 2024 13:51)  58M with h/o prostate CA and Anal ca, treated with Radiation 4 years ago, subsequently had ureteral strictures requiring stents.   With b/l hydronephrosis despite indwelling metal stents as well as recurrent pseudomonas UTI.  s/p B/l PCN placement by IR 2/26 now POD1 s/p cystoscopy with bilateral ureteral stent removal     -c/w zosyn per ID  -continue care per primary team  - Follow up Stent Cx  -cleared for D/C from a  standpoint.    Patient will dc to follow up with urology and ID taniya continue treatment with PO cipro      HPI:  58 years old male with h/o HTN, HLD, HIV on HAART ( CD 4 608, VL undetectable on 2/12/24), h/o jared Ca (s/p chemo, radio therapy, surgery, colostomy 03/2022), prostate Ca s/p radiotherapy, ureteral stricture s/p stent ( most recent exchange with Dr Mathew on 12/13/2023), recent admission with UTI ( urine culture with pseudomonas)  present to ED with complain of dysuria, suprapubic pain. Patient was admitted in 01/2024 for pseudomonas UTI, treated with IV cefepime x 7 days with improvement. 5 days after discharge, patient noted to have dysuria again. Seen by urology, intially started on Augmentin which was later switched to ciprofloxacin x 7 days on 2/12/24 after seeing ID in clinic. Patient also received Bicillin L-A IM in ID clinic as well. No fever or chills.   Hemodynamically stable, afebrile, sat well at RA. No leukocytosis, plt 355, K 4.1, Cr 1.15, lactate 2.3. UA dirty. CT abd/pelvis with bilateral ureteral stents. Stable bilateral moderate to severe hydronephrosis without obstructing focus. Bilateral periureteral and perivesicular stranding may be reactive and/or reflect UTI. Lingular consolidation      SH: no toxic habits  FH: HTN (22 Feb 2024 13:51)  58M with h/o prostate CA and Anal ca, treated with Radiation 4 years ago, subsequently had ureteral strictures requiring stents.   With b/l hydronephrosis despite indwelling metal stents as well as recurrent pseudomonas UTI.  s/p B/l PCN placement by IR 2/26 now POD1 s/p cystoscopy with bilateral ureteral stent removal     -c/w zosyn per ID  -continue care per primary team  - Follow up Stent Cx  -cleared for D/C from a  standpoint.    Patient will dc to follow up with urology and ID taniya continue treatment with PO cipro     Patient with sepsis present on admission secondary to pseudomunus in urine and indwelling stent which was removed on this admission

## 2024-02-29 NOTE — PROGRESS NOTE ADULT - PROVIDER SPECIALTY LIST ADULT
Infectious Disease
Hospitalist
Infectious Disease
Urology
Hospitalist
Infectious Disease
Urology
Hospitalist
Infectious Disease
Hospitalist

## 2024-03-01 ENCOUNTER — TRANSCRIPTION ENCOUNTER (OUTPATIENT)
Age: 59
End: 2024-03-01

## 2024-03-01 LAB
-  AMIKACIN: SIGNIFICANT CHANGE UP
-  AZTREONAM: SIGNIFICANT CHANGE UP
-  CEFEPIME: SIGNIFICANT CHANGE UP
-  CEFTAZIDIME/AVIBACTAM: SIGNIFICANT CHANGE UP
-  CEFTAZIDIME: SIGNIFICANT CHANGE UP
-  CEFTOLOZANE/TAZOBACTAM: SIGNIFICANT CHANGE UP
-  CIPROFLOXACIN: SIGNIFICANT CHANGE UP
-  IMIPENEM: SIGNIFICANT CHANGE UP
-  LEVOFLOXACIN: SIGNIFICANT CHANGE UP
-  MEROPENEM: SIGNIFICANT CHANGE UP
-  PIPERACILLIN/TAZOBACTAM: SIGNIFICANT CHANGE UP
BLANDM BLD POS QL PROBE: SIGNIFICANT CHANGE UP
CULTURE RESULTS: ABNORMAL
CULTURE RESULTS: ABNORMAL
METHOD TYPE: SIGNIFICANT CHANGE UP
METHOD TYPE: SIGNIFICANT CHANGE UP
ORGANISM # SPEC MICROSCOPIC CNT: ABNORMAL
ORGANISM # SPEC MICROSCOPIC CNT: ABNORMAL
ORGANISM # SPEC MICROSCOPIC CNT: SIGNIFICANT CHANGE UP
SPECIMEN SOURCE: SIGNIFICANT CHANGE UP

## 2024-03-04 ENCOUNTER — APPOINTMENT (OUTPATIENT)
Dept: GERIATRICS | Facility: CLINIC | Age: 59
End: 2024-03-04
Payer: MEDICAID

## 2024-03-04 VITALS
DIASTOLIC BLOOD PRESSURE: 88 MMHG | RESPIRATION RATE: 16 BRPM | WEIGHT: 205.18 LBS | TEMPERATURE: 98.1 F | HEIGHT: 71 IN | SYSTOLIC BLOOD PRESSURE: 127 MMHG | HEART RATE: 78 BPM | OXYGEN SATURATION: 98 % | BODY MASS INDEX: 28.73 KG/M2

## 2024-03-04 DIAGNOSIS — R10.9 UNSPECIFIED ABDOMINAL PAIN: ICD-10-CM

## 2024-03-04 PROCEDURE — 99213 OFFICE O/P EST LOW 20 MIN: CPT

## 2024-03-04 NOTE — PHYSICAL EXAM
[General Appearance - Alert] : alert [General Appearance - In No Acute Distress] : in no acute distress [General Appearance - Well Developed] : well developed [Oriented To Time, Place, And Person] : oriented to person, place, and time [Affect] : the affect was normal [Sclera] : the sclera and conjunctiva were normal [Normal Oral Mucosa] : normal oral mucosa [Neck Appearance] : the appearance of the neck was normal [] : no respiratory distress [Auscultation Breath Sounds / Voice Sounds] : lungs were clear to auscultation bilaterally [Heart Sounds] : normal S1 and S2 [Heart Rate And Rhythm] : heart rate was normal and rhythm regular [Edema] : there was no peripheral edema [Bowel Sounds] : normal bowel sounds [Abdomen Tenderness] : non-tender [Abdomen Soft] : soft [Skin Color & Pigmentation] : normal skin color and pigmentation [Abnormal Walk] : normal gait [FreeTextEntry1] : flat affect

## 2024-03-04 NOTE — HISTORY OF PRESENT ILLNESS
[FreeTextEntry1] : 58yoM with anal cancer presents for follow-up palliative care visit, referred by Oncology.   PMH significant for prostate cancer s/p RT in 2016 (on Lupron), HIV on Biktarvy.  In 2020 at age of 55 patient had his screening colonoscopy performed by Dr. Lawrence in June 2020 that as per patient was informed to be within normal limits. Subsequently patient had felt a bump in the perirectal area and at the time was told by GI that this could be a hemorrhoid and was treated as such. His symptoms did improve and was eventually referred to Dr. Avilez for further evaluation and in March 2021 patient was seen by Dr. Avilez and underwent an exam and biopsy in April that revealed evidence of anal squamous cell carcinoma. Patient was subsequently referred for evaluation and treatment. Patient did not have full excision of the tumor rather a excisional biopsy.  Main reason for which patient is referred is pain which has been an issue for him for the last 3 months. The pain is localized to the anus/rectum. It is severe, not well controlled. Worse with defecation, sitting. At its worst the pain is 10/10. Rates it as 8/10 presently.   He is currently using Oxycodone IR 20mg PRN, takes this every 6 hours. He was previously tried on transdermal fentanyl 25mcg/hr and oxycontin but he states that neither helped his pain. He no longer uses either.  Has tried using anusol suppository without relief.   Underwent EUA by Dr. Avilez on 11/26/21 for evaluation of persistent kandy-anal pain and a 4 x 4 cm ulcer was found but no fissure or fistula. A biopsy was performed and was negative for dysplasia or malignancy.     He was admitted [facility] (3/17-4/2/2022) for acute on chronic rectal pain associated w/ hematochezia and constipation.  MRI c/f perianal abscess.  He went to the OR on 3/25; severe induration seen circumferentially with ulcerated mucosa and recessed cavity on left side. No definite abscess seen therefore no drainage or sphincterotomy performed. He is s/p diverting colostomy 3/30.   Interval History (3/4/24): Patient seen in office for follow up visit.   He was admitted to [facility] from 2/22 - 2/29/24 during which time (2/26/24) he underwent placement of b/l nephrostomy tubes and removal of ureteral stent with Dr. Mathew. He is now off of ER opioids.  He was sent home on Oxycodone/Acetaminophen 5/325 which he has used on a couple of occasions since arriving home due to pain at the NT site. tubes are draining well, yellow urine. He came off of Zyprexa because they found it was exacerbating his constipation.    ROS: + anxiety d/o - uses alprazolam 1mg PRN + insomnia - is on long-standing ambien 10mg QHS  Denies nausea/vomiting, diarrhea. All other ROS as outlined or noncontributory.   Patient is , lives with his wife.  He is not presently driving, uses a car service to get to medical appointments.  Quit smoking tobacco about two years ago. No EtOH.  Passes time by watching TV.   PCP: Dr. Jarad Schwartz Psychiatrist: Dr. Eric Yin ([facility]) Urologist: Dr. Mathew ([facility]) Dr. Manuel Mtahews ([phone])  [employer] Ref#: [id number]

## 2024-03-04 NOTE — ASSESSMENT
[FreeTextEntry1] : 58yoM with:  # Anal Cancer - s/p 5400cGy/ 15 fractions completed on 8/16/21 with Mitomycin Day #1. Xeloda discontinued 2/2 GI issues. Follow up with Med Onc, Rad Onc,   # Penile/abdominal pain - Patient's pain almost entirely resolved now that he underwent placement of b/l nephrostomy tubes and removal of ureteral stent.   # Anxiety d/o - On PRN alprazolam 1mg. Follows closely with psychiatry.  # Encounter for Palliative Care - Emotional support provided.  Follow up as needed, call with questions/issues

## 2024-03-04 NOTE — REASON FOR VISIT
[Follow-Up] : a follow-up visit [Home] : at home, [unfilled] , at the time of the visit. [Medical Office: ([facility])___] : at the medical office located in  [Verbal consent obtained from patient] : the patient, [unfilled]

## 2024-03-05 ENCOUNTER — RX RENEWAL (OUTPATIENT)
Age: 59
End: 2024-03-05

## 2024-03-05 RX ORDER — KETOCONAZOLE 20 MG/G
2 CREAM TOPICAL
Qty: 120 | Refills: 6 | Status: ACTIVE | COMMUNITY
Start: 2023-12-05 | End: 1900-01-01

## 2024-03-06 ENCOUNTER — APPOINTMENT (OUTPATIENT)
Dept: INTERNAL MEDICINE | Facility: CLINIC | Age: 59
End: 2024-03-06
Payer: MEDICAID

## 2024-03-06 VITALS
DIASTOLIC BLOOD PRESSURE: 85 MMHG | TEMPERATURE: 97.3 F | HEIGHT: 71 IN | HEART RATE: 82 BPM | BODY MASS INDEX: 28.7 KG/M2 | SYSTOLIC BLOOD PRESSURE: 124 MMHG | WEIGHT: 205 LBS | OXYGEN SATURATION: 98 %

## 2024-03-06 DIAGNOSIS — Z09 ENCOUNTER FOR FOLLOW-UP EXAMINATION AFTER COMPLETED TREATMENT FOR CONDITIONS OTHER THAN MALIGNANT NEOPLASM: ICD-10-CM

## 2024-03-06 DIAGNOSIS — F41.9 ANXIETY DISORDER, UNSPECIFIED: ICD-10-CM

## 2024-03-06 DIAGNOSIS — K65.1 PERITONEAL ABSCESS: ICD-10-CM

## 2024-03-06 PROCEDURE — G2211 COMPLEX E/M VISIT ADD ON: CPT | Mod: NC,1L

## 2024-03-06 PROCEDURE — 99495 TRANSJ CARE MGMT MOD F2F 14D: CPT

## 2024-03-06 RX ORDER — CIPROFLOXACIN HYDROCHLORIDE 500 MG/1
500 TABLET, FILM COATED ORAL DAILY
Refills: 0 | Status: ACTIVE | COMMUNITY

## 2024-03-06 RX ORDER — METRONIDAZOLE 500 MG/1
500 TABLET ORAL TWICE DAILY
Qty: 14 | Refills: 0 | Status: DISCONTINUED | COMMUNITY
Start: 2022-09-23 | End: 2024-03-06

## 2024-03-06 RX ORDER — CEPHALEXIN 500 MG/1
500 CAPSULE ORAL
Qty: 28 | Refills: 0 | Status: ACTIVE | COMMUNITY
Start: 2023-09-14

## 2024-03-06 RX ORDER — PHENAZOPYRIDINE HYDROCHLORIDE 200 MG/1
200 TABLET ORAL
Qty: 60 | Refills: 0 | Status: ACTIVE | COMMUNITY
Start: 2024-02-29

## 2024-03-06 RX ORDER — METHYLNALTREXONE BROMIDE 150 MG/1
150 TABLET ORAL
Qty: 90 | Refills: 5 | Status: DISCONTINUED | COMMUNITY
Start: 2022-02-10 | End: 2024-03-06

## 2024-03-06 RX ORDER — FUROSEMIDE 40 MG/1
40 TABLET ORAL DAILY
Qty: 90 | Refills: 3 | Status: DISCONTINUED | COMMUNITY
Start: 2023-11-02 | End: 2024-03-06

## 2024-03-06 RX ORDER — METRONIDAZOLE 500 MG/1
500 TABLET ORAL
Qty: 3 | Refills: 0 | Status: DISCONTINUED | COMMUNITY
Start: 2022-08-15 | End: 2024-03-06

## 2024-03-06 RX ORDER — OLANZAPINE 20 MG/1
TABLET, FILM COATED ORAL
Refills: 0 | Status: DISCONTINUED | COMMUNITY
End: 2024-03-06

## 2024-03-06 RX ORDER — TAMSULOSIN HYDROCHLORIDE 0.4 MG/1
0.4 CAPSULE ORAL
Qty: 90 | Refills: 0 | Status: ACTIVE | COMMUNITY
Start: 2023-09-19

## 2024-03-06 NOTE — HISTORY OF PRESENT ILLNESS
[Post-hospitalization from ___ Hospital] : Post-hospitalization from [unfilled] Hospital [Admitted on: ___] : The patient was admitted on [unfilled] [Discharged on ___] : discharged on [unfilled] [Discharge Summary] : discharge summary [Discharge Med List] : discharge medication list [Pertinent Labs] : pertinent labs [Med Reconciliation] : medication reconciliation has been completed [Patient Contacted By: ____] : and contacted by [unfilled] [FreeTextEntry2] : patient was admitted to hospital for severe UTI with pylonephritis treated with IV abx and is feeling much better now no more pain- is not taking pain meds and vitals are stable is taking cipro until sees urology now urine draining from urethral stents into a urine bag  htn- well controlled

## 2024-03-06 NOTE — PHYSICAL EXAM
[Well Nourished] : well nourished [No Acute Distress] : no acute distress [Well Developed] : well developed [Well-Appearing] : well-appearing [Normal Sclera/Conjunctiva] : normal sclera/conjunctiva [PERRL] : pupils equal round and reactive to light [EOMI] : extraocular movements intact [Normal Outer Ear/Nose] : the outer ears and nose were normal in appearance [Normal Oropharynx] : the oropharynx was normal [No JVD] : no jugular venous distention [No Lymphadenopathy] : no lymphadenopathy [Thyroid Normal, No Nodules] : the thyroid was normal and there were no nodules present [Supple] : supple [No Respiratory Distress] : no respiratory distress  [No Accessory Muscle Use] : no accessory muscle use [Clear to Auscultation] : lungs were clear to auscultation bilaterally [Normal Rate] : normal rate  [Normal S1, S2] : normal S1 and S2 [Regular Rhythm] : with a regular rhythm [No Murmur] : no murmur heard [No Carotid Bruits] : no carotid bruits [No Varicosities] : no varicosities [No Abdominal Bruit] : a ~M bruit was not heard ~T in the abdomen [Pedal Pulses Present] : the pedal pulses are present [No Edema] : there was no peripheral edema [No Palpable Aorta] : no palpable aorta [Soft] : abdomen soft [No Extremity Clubbing/Cyanosis] : no extremity clubbing/cyanosis [Non-distended] : non-distended [Non Tender] : non-tender [No Masses] : no abdominal mass palpated [No HSM] : no HSM [Normal Bowel Sounds] : normal bowel sounds [Normal Posterior Cervical Nodes] : no posterior cervical lymphadenopathy [Normal Anterior Cervical Nodes] : no anterior cervical lymphadenopathy [No CVA Tenderness] : no CVA  tenderness [No Spinal Tenderness] : no spinal tenderness [No Joint Swelling] : no joint swelling [Grossly Normal Strength/Tone] : grossly normal strength/tone [Coordination Grossly Intact] : coordination grossly intact [No Rash] : no rash [No Focal Deficits] : no focal deficits [Normal Gait] : normal gait [Deep Tendon Reflexes (DTR)] : deep tendon reflexes were 2+ and symmetric [Normal Insight/Judgement] : insight and judgment were intact [Normal Affect] : the affect was normal

## 2024-03-06 NOTE — ASSESSMENT
[FreeTextEntry1] : patient was admitted to hospital for severe UTI with pylonephritis treated with IV abx and is feeling much better now no more pain- is not taking pain meds and vitals are stable is taking cipro until sees urology now urine draining from urethral stents into a urine bag is following with with urology next week   htn- well controlled

## 2024-03-14 ENCOUNTER — APPOINTMENT (OUTPATIENT)
Dept: INFECTIOUS DISEASE | Facility: CLINIC | Age: 59
End: 2024-03-14
Payer: MEDICAID

## 2024-03-14 ENCOUNTER — OUTPATIENT (OUTPATIENT)
Dept: OUTPATIENT SERVICES | Facility: HOSPITAL | Age: 59
LOS: 1 days | End: 2024-03-14
Payer: MEDICAID

## 2024-03-14 VITALS
TEMPERATURE: 98 F | SYSTOLIC BLOOD PRESSURE: 126 MMHG | DIASTOLIC BLOOD PRESSURE: 81 MMHG | WEIGHT: 207 LBS | HEART RATE: 85 BPM | BODY MASS INDEX: 28.87 KG/M2

## 2024-03-14 DIAGNOSIS — T83.593A INFECTION AND INFLAMMATORY REACTION DUE TO OTHER URINARY STENTS, INITIAL ENCOUNTER: ICD-10-CM

## 2024-03-14 DIAGNOSIS — N39.0 URINARY TRACT INFECTION, SITE NOT SPECIFIED: ICD-10-CM

## 2024-03-14 DIAGNOSIS — Z93.3 COLOSTOMY STATUS: Chronic | ICD-10-CM

## 2024-03-14 DIAGNOSIS — Z85.46 PERSONAL HISTORY OF MALIGNANT NEOPLASM OF PROSTATE: ICD-10-CM

## 2024-03-14 DIAGNOSIS — N12 TUBULO-INTERSTITIAL NEPHRITIS, NOT SPECIFIED AS ACUTE OR CHRONIC: ICD-10-CM
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
## 2024-03-14 DIAGNOSIS — A53.9 SYPHILIS, UNSPECIFIED: ICD-10-CM

## 2024-03-14 DIAGNOSIS — T40.2X5A ADVERSE EFFECT OF OTHER OPIOIDS, INITIAL ENCOUNTER: ICD-10-CM

## 2024-03-14 DIAGNOSIS — Z85.048 PERSONAL HISTORY OF OTHER MALIGNANT NEOPLASM OF RECTUM, RECTOSIGMOID JUNCTION, AND ANUS: ICD-10-CM

## 2024-03-14 DIAGNOSIS — Z21 ASYMPTOMATIC HUMAN IMMUNODEFICIENCY VIRUS [HIV] INFECTION STATUS: ICD-10-CM

## 2024-03-14 DIAGNOSIS — R21 RASH AND OTHER NONSPECIFIC SKIN ERUPTION: ICD-10-CM

## 2024-03-14 DIAGNOSIS — A41.52 SEPSIS DUE TO PSEUDOMONAS: ICD-10-CM

## 2024-03-14 DIAGNOSIS — K59.03 DRUG INDUCED CONSTIPATION: ICD-10-CM
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
## 2024-03-14 DIAGNOSIS — I10 ESSENTIAL (PRIMARY) HYPERTENSION: ICD-10-CM

## 2024-03-14 DIAGNOSIS — Z92.21 PERSONAL HISTORY OF ANTINEOPLASTIC CHEMOTHERAPY: ICD-10-CM

## 2024-03-14 DIAGNOSIS — Z92.3 PERSONAL HISTORY OF IRRADIATION: ICD-10-CM

## 2024-03-14 DIAGNOSIS — B20 HUMAN IMMUNODEFICIENCY VIRUS [HIV] DISEASE: ICD-10-CM

## 2024-03-14 DIAGNOSIS — E78.5 HYPERLIPIDEMIA, UNSPECIFIED: ICD-10-CM

## 2024-03-14 PROCEDURE — 99215 OFFICE O/P EST HI 40 MIN: CPT

## 2024-03-14 PROCEDURE — G0463: CPT

## 2024-03-14 NOTE — ASSESSMENT
[FreeTextEntry1] : 58 m with HTN, HIV diagnosed 1992 on Biktarvy, prostate ca 2016 s/p radiation, anal cancer (dx 4/2021) s/p radiation and chemotherapy (last 8/2021) complicated by an ulcer, abscess and rectal bleeding s/p loop colostomy 3/30/22 and then s/p APR with end colostomy creation, VRAM flap closure, 8/24 found to have a large abscess as well, abscess cx with clostridium, bacteroides again was admitted for GIB and pelvic/groin abscess s/p drain placement and CT also showed L ischial tuberosity osteo so pt received zosyn in the hospital and was discharged with PO levo and flagyl to finish a 6 week course he was admitted 11/2022 with hematuria and hydro, urine cx and AFB were negative, s/p b/l nephrostomy, and then had laser lithotripsy and stent placement 1/25/23, the nephrostomies were clamped and removed now with b/l stents, he still had severe pain while sitting down at the flap site, dysuria and penile/scrotal numbness, pt had multiple ER visits for pain but urine cx negative, he c/o penile numbness 8/2023 so LS spine and pelvic MRI was done 9/22 which showed 1.5 cm fat rim-enhancing collection at the left posterolateral aspect of the perineal flap most likely representing a small abscess, then pt developed fever, abd pain, back pain, dysuria and was admitted with sepsis, proteus UTI CT: redemonstrated moderate hydronephrosis with bilateral urothelial thickening and adjacent inflammatory stranding, which may be due to reactive inflammation or ascending UTI s/p 2 weeks of zosym => augmentin now has a rash that was being followed by derm s/p biopsy and showed syphilis, pt was treated for syphilis multiple times before and this does not appear to be a new infection either (as pt has not been sexually active, in and out of hospital for surgeries with RPR 1:1) but still treated with a bicillin and now rash improved was also seen by urology the urine cx last week showed a pseudomonas which was I to cipro and levo  but pt was given augmentin with no improvment was given cipro but then was admitted in view of lower abd pain and s/p stent removal and b/l nephrostomy, urine cx again with pseudomonas but R to cipro/levo s/p zosyn now pt here for f/u, doing well and asymptomatic   HIV on Biktarvy, VL:UD, CD4 608 c/w biktarvy and repeat VL, CD4   rash s/p biopsy by derm which showed syphilis 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, will repeat RPR later  anal Ca s/p chemo and RT with rectal ulcer, abscess and bleed s/p loop colostomy 3/30/22 and then s/p APR with end colostomy creation, VRAM flap closure, 8/24 found to have a large abscess as well, abscess cx with clostridium, bacteroides again was admitted for GIB and pelvic/groin abscess s/p drain placement and CT also showed L ischial tuberosity osteo so pt received zosyn in the hospital and was discharged with PO levo and flagyl to finish a 6 week course pelvic MRI 9/2023 showed a small abscess in the flap, again s/p a 2 week course of zosyn, augmentin  penile and scrotal numbness with incontinency pt stated this started after surgery so likely a surgical complication, LS spine MRI and pelvic MRI 9/2023 did not show anything to explain this problem, was also seen by urology and was sent to neuro  hematuria, hydro, nephrolithiasis admitted 11/2022 with hematuria and hydro, urine cx and AFB were negative, s/p b/l nephrostomy, and then had laser lithotripsy and stent placement 1/25/23, the nephrostomies were clamped and removed now with b/l stents multiple UTIs after, last urine cx negative and going for cysoscopy and stent exchange in 2 days but has some dysuria, and last urine showed pseudomonas I to cipro/levo and urology gave him augmentin then was admitted for abd pain s/p cytoscopy and stent remova/adjustment and b/l nephrostomy, urine cx again with pseudomonas but R to cipro/levo, s/p a course of zosyn  HTN, follows with medicine and medications were just adjusted but still has elevated BP, will follow with medicine  HCM: s/p flu and COVID vaccine s/p prevnar 2022, pneumovax 2023 f/u with dental and ophthalmology.

## 2024-03-14 NOTE — PHYSICAL EXAM
[General Appearance - In No Acute Distress] : in no acute distress [Sclera] : the sclera and conjunctiva were normal [Outer Ear] : the ears and nose were normal in appearance [Neck Appearance] : the appearance of the neck was normal [] : no respiratory distress [Auscultation Breath Sounds / Voice Sounds] : lungs were clear to auscultation bilaterally [Heart Sounds] : normal S1 and S2 [Edema] : there was no peripheral edema [Bowel Sounds] : normal bowel sounds [Abdomen Soft] : soft [Costovertebral Angle Tenderness] : no CVA tenderness [No Palpable Adenopathy] : no palpable adenopathy [Musculoskeletal - Swelling] : no joint swelling [FreeTextEntry1] : resolved erthemtaous patches on dorsal feet and macular lesions on torso [No Focal Deficits] : no focal deficits [Affect] : the affect was normal

## 2024-03-14 NOTE — REVIEW OF SYSTEMS
[Fever] : no fever [Chills] : no chills [Eye Pain] : no eye pain [Red Eyes] : eyes not red [Earache] : no earache [Loss Of Hearing] : no hearing loss [Palpitations] : no palpitations [Chest Pain] : no chest pain [Wheezing] : no wheezing [Shortness Of Breath] : no shortness of breath [Vomiting] : no vomiting [Dysuria] : no dysuria [Joint Swelling] : no joint swelling [Suicidal] : not suicidal [Easy Bleeding] : no tendency for easy bleeding [Easy Bruising] : no tendency for easy bruising [Negative] : Heme/Lymph [FreeTextEntry8] : resolved penile pain  [FreeTextEntry9] : back pain [de-identified] : rash on feet and also macular on torso [de-identified] : forgetful and sleepy

## 2024-03-15 ENCOUNTER — APPOINTMENT (OUTPATIENT)
Dept: UROLOGY | Facility: CLINIC | Age: 59
End: 2024-03-15
Payer: MEDICAID

## 2024-03-15 VITALS
SYSTOLIC BLOOD PRESSURE: 132 MMHG | DIASTOLIC BLOOD PRESSURE: 91 MMHG | TEMPERATURE: 97.8 F | OXYGEN SATURATION: 99 % | HEART RATE: 81 BPM

## 2024-03-15 DIAGNOSIS — Z43.6 ENCOUNTER FOR ATTENTION TO OTHER ARTIFICIAL OPENINGS OF URINARY TRACT: ICD-10-CM

## 2024-03-15 PROCEDURE — 99213 OFFICE O/P EST LOW 20 MIN: CPT

## 2024-03-15 NOTE — PHYSICAL EXAM
[General Appearance - Well Developed] : well developed [General Appearance - Well Nourished] : well nourished [Normal Appearance] : normal appearance [General Appearance - In No Acute Distress] : no acute distress [Well Groomed] : well groomed [Abdomen Tenderness] : non-tender [Abdomen Soft] : soft [Urethral Meatus] : meatus normal [Costovertebral Angle Tenderness] : no ~M costovertebral angle tenderness [Urinary Bladder Findings] : the bladder was normal on palpation [Scrotum] : the scrotum was normal [No Prostate Nodules] : no prostate nodules [Testes Mass (___cm)] : there were no testicular masses [Edema] : no peripheral edema [] : no respiratory distress [Respiration, Rhythm And Depth] : normal respiratory rhythm and effort [Exaggerated Use Of Accessory Muscles For Inspiration] : no accessory muscle use [Affect] : the affect was normal [Oriented To Time, Place, And Person] : oriented to person, place, and time [Mood] : the mood was normal [Not Anxious] : not anxious [Normal Station and Gait] : the gait and station were normal for the patient's age [No Focal Deficits] : no focal deficits [No Palpable Adenopathy] : no palpable adenopathy

## 2024-03-15 NOTE — PHYSICAL EXAM
[General Appearance - Well Developed] : well developed [General Appearance - Well Nourished] : well nourished [Normal Appearance] : normal appearance [Well Groomed] : well groomed [General Appearance - In No Acute Distress] : no acute distress [Abdomen Tenderness] : non-tender [Abdomen Soft] : soft [Urethral Meatus] : meatus normal [Costovertebral Angle Tenderness] : no ~M costovertebral angle tenderness [Urinary Bladder Findings] : the bladder was normal on palpation [Scrotum] : the scrotum was normal [Testes Mass (___cm)] : there were no testicular masses [No Prostate Nodules] : no prostate nodules [Edema] : no peripheral edema [] : no respiratory distress [Respiration, Rhythm And Depth] : normal respiratory rhythm and effort [Exaggerated Use Of Accessory Muscles For Inspiration] : no accessory muscle use [Oriented To Time, Place, And Person] : oriented to person, place, and time [Affect] : the affect was normal [Mood] : the mood was normal [Not Anxious] : not anxious [Normal Station and Gait] : the gait and station were normal for the patient's age [No Focal Deficits] : no focal deficits [No Palpable Adenopathy] : no palpable adenopathy

## 2024-03-15 NOTE — HISTORY OF PRESENT ILLNESS
[FreeTextEntry1] : 57y/o male, recent bilateral hydronephrosis mostly on the left side possibly related to colostomy, underwent bilateral metal stent placement on 12/12/2023. Stents were removed on february 2024 and replaced with percutaneous bilateral nephrostomies.  The patient denies pain DARIEN. Occasional burning of the terminal urethra. The patient states that he is still urinating few drops from the penis.   Nephrostomies working regularly. Suggesting to the patient to communicate with IR and plan for periodical replacement of the nephrostomy tubes. F/U with oncologist in case of no urinary specific symptoms.

## 2024-03-19 ENCOUNTER — APPOINTMENT (OUTPATIENT)
Dept: NEUROLOGY | Facility: CLINIC | Age: 59
End: 2024-03-19
Payer: MEDICAID

## 2024-03-19 DIAGNOSIS — C21.0 MALIGNANT NEOPLASM OF ANUS, UNSPECIFIED: ICD-10-CM

## 2024-03-19 PROCEDURE — 99443: CPT | Mod: 95

## 2024-03-19 NOTE — ASSESSMENT
[FreeTextEntry1] : The patient is here for evaluation of penile pain as well as testicular pain as well as penile numbness. He denies back pain. Patient's symptoms are likely due to neurological etiology. Will however obtain an MRI of the lumbosacral plexus to evaluate for any abnormalities that may be affecting that area. I am unable to do a nerve conduction EMG to evaluate for neuropathies affecting the genital area.  MRI of the lumbar spine shows s multilevel chronic Schmorl's node formation. here is trace retrolisthesis of L2 on L3, L3 on L4, and L5 on S1 which is unchanged. L2-L3: There is bilateral facet arthrosis. There is minimal disc bulge. There is minimal bilateral neural foraminal narrowing. There is no spinal canal narrowing. Findings are unchanged. L3-L4: There is minimal disc bulge. There is mild bilateral facet arthrosis. There is mild bilateral neural foraminal narrowing. There is slight flattening of the ventral thecal sac. Findings are grossly unchanged. L4-L5: There is prominence of the posterior epidural fat. There is a diffuse disc bulge. There is advanced bilateral facet arthrosis and ligamentum flavum hypertrophy. Findings result in marked effacement of the thecal sac which is grossly similar compared to the prior examination. There is moderate right and mild left neural foraminal narrowing. Findings are grossly unchanged. L5-S1: There is a mild diffuse disc bulge posterior osseous ridging. There is bilateral facet arthrosis. There is moderate left and mild right neural foraminal narrowing. There is no spinal canal narrowing. Findings are grossly unchanged.  Pain improved after kidney stent was removed, will get MRI LS plexus to r/o plexus etiology of back and penile pain  I spent the time noted on the day of this patient encounter preparing for, providing and documenting the above E/M service and counseling and educate patient on differential, workup, disease course, and treatment/management. Education was provided to the patient during this encounter. All questions and concerns were answered and addressed in detail. The patient verbalized understanding and agreed to plan. Patient was advised to continue to monitor for neurologic symptoms and to notify my office or go to the nearest emergency room if there are any changes. Any orders/referrals and communications were provided as well.  Side effects of the above medications were discussed in detail including but not limited to applicable black box warning and teratogenicity as appropriate. For patients with seizures, I discussed risk of SUDEP with patient and advised that SUDEP risk can be minimized with good seizure control through medication compliance and other measures. Patient was advised to bring previous records to my office, including CD of imaging, when applicable.

## 2024-03-19 NOTE — HISTORY OF PRESENT ILLNESS
[FreeTextEntry1] : 58 m with HTN, HIV diagnosed 1992 on Biktarvy, prostate ca 2016 s/p radiation, anal cancer (dx 4/2021) s/p radiation and chemotherapy (last 8/2021) complicated by an ulcer, abscess and rectal bleedings/p loop colostomy 3/30/22 and then s/p APR with end colostomy creation, VRAM flap closure, 8/24 found to have a large abscess as well, abscess cx with clostridium, bacteroides again was admitted for GIB and pelvic/groin abscess s/p drain placement and CT also showed L ischial tuberosity osteo so pt received zosyn in the hospital and was discharged with PO levo and flagyl patient is referred here for penile numbness. The patient denies any numbness in his testicle or penis. He defecated pain chest testicles and penis. He is unable to describe. The pain is not present when he does not pass his genital area. He denies back pain. No weakness in the leg numbness and tingling in the legs. No radicular pain from the back to the legs, no groins.  The patient complains of abdominal pain is incisional site. Patient was hospitalized and had the kidney stent removed, back pain improved by 80%.

## 2024-03-23 NOTE — REVIEW OF SYSTEMS
[Abdominal Pain] : abdominal pain [Skin Lesions] : skin lesion [Anxiety] : anxiety [Negative] : Endocrine [Chills] : no chills [Fever] : no fever [Eye Pain] : no eye pain [Red Eyes] : eyes not red [Nosebleeds] : no nosebleeds [Earache] : no earache [Shortness Of Breath] : no shortness of breath [Chest Pain] : no chest pain [Cough] : no cough [Vomiting] : no vomiting [Diarrhea] : no diarrhea [Joint Swelling] : no joint swelling [Skin Wound] : no skin wound [Limb Pain] : no limb pain [FreeTextEntry5] : Abnormal EKG, HTN [FreeTextEntry8] : Hx of anal and prostate cancer [de-identified] : Discolored, thickened, elongated nails 3 and 5 of the right foot, nails 1 and 5 of the left foot; Dermatitis [FreeTextEntry9] : Back pain, b/l leg edema [de-identified] : HIV

## 2024-03-23 NOTE — PHYSICAL EXAM
[General Appearance - Alert] : alert [General Appearance - In No Acute Distress] : in no acute distress [2+] : left foot dorsalis pedis 2+ [Sensation] : the sensory exam was normal to light touch and pinprick [No Focal Deficits] : no focal deficits [Oriented To Time, Place, And Person] : oriented to person, place, and time [Affect] : the affect was normal [Ankle Swelling (On Exam)] : not present [Varicose Veins Of Lower Extremities] : not present [] : not present [de-identified] :  5/5 muscle strength for all muscles and tendons crossing the foot and ankle joints, ankle joint and STJ ROM intact b/l. No pain with calf compression b/l. [FreeTextEntry1] :  Light touch sensation intact to the level of the digits b/l.

## 2024-03-23 NOTE — ASSESSMENT
[FreeTextEntry1] : Patient seen and evaluated. Discussed etiology and treatment plan. Patient verbalized understanding.  Discussed various treatment options for management of mycotic nails, including topical vs. oral vs. laser intervention. The offending nail margins were mechanically and electrically debrided to the level of normal underlying nail bed with good relief obtained as evidenced by pain-free ambulation. The patient was instructed to attempt to maintain the length and thickness of their nails as best as possible. Patient is not a candidate for oral treatment at this time due to history of prostate and anal cancer. Advised patient to start with topical treatment and patient agrees at this time.  Rx for Ciclopirox has been sent to the patient's pharmacy; patient has been instructed on application instructions.  Spent 30 minutes for patient care and medical decision making. RTC in 3 months for follow up.

## 2024-03-23 NOTE — REASON FOR VISIT
[Initial Visit] : an initial visit for [FreeTextEntry2] : Patient presents with concerns for discolored, painful, thickened, elongated nails 3 and 5 of the right foot, nails 1 and 5 of the left foot.

## 2024-03-23 NOTE — HISTORY OF PRESENT ILLNESS
[FreeTextEntry1] : 58 year old male presents with concerns for discolored, painful, thickened, elongated nails 3 and 5 of the right foot, nails 1 and 5 of the left foot. Patient conveys that he has been experiencing these symptoms since the past 4-5 years. Patient states his nails have been causing pain when walking and wearing shoes. Patient denies any numbness, tingling, burning sensation to his feet. Patient also denies any calf pain at this time. Patient relates that he has history of prostate and anal cancer. Patient mentions that he has history of surgery, chemotherapy, and radiation for cancer treatment.

## 2024-03-26 ENCOUNTER — APPOINTMENT (OUTPATIENT)
Dept: MRI IMAGING | Facility: CLINIC | Age: 59
End: 2024-03-26
Payer: MEDICAID

## 2024-03-26 PROCEDURE — A9585: CPT

## 2024-03-26 PROCEDURE — 72197 MRI PELVIS W/O & W/DYE: CPT

## 2024-04-03 RX ORDER — CIPROFLOXACIN HYDROCHLORIDE 500 MG/1
500 TABLET, FILM COATED ORAL
Qty: 14 | Refills: 0 | Status: ACTIVE | COMMUNITY
Start: 2024-01-20 | End: 1900-01-01

## 2024-04-08 LAB
APPEARANCE: ABNORMAL
BACTERIA UR CULT: NORMAL
BACTERIA: ABNORMAL /HPF
BILIRUBIN URINE: NEGATIVE
BLOOD URINE: ABNORMAL
CAST: 0 /LPF
COLOR: NORMAL
EPITHELIAL CELLS: 1 /HPF
GLUCOSE QUALITATIVE U: NEGATIVE MG/DL
KETONES URINE: NEGATIVE MG/DL
LEUKOCYTE ESTERASE URINE: ABNORMAL
MICROSCOPIC-UA: NORMAL
NITRITE URINE: NEGATIVE
PH URINE: 7
PROTEIN URINE: 100 MG/DL
RED BLOOD CELLS URINE: 22 /HPF
REVIEW: NORMAL
SPECIFIC GRAVITY URINE: 1.01
UROBILINOGEN URINE: 0.2 MG/DL
WHITE BLOOD CELLS URINE: 176 /HPF
YEAST-LIKE CELLS: PRESENT

## 2024-04-11 ENCOUNTER — APPOINTMENT (OUTPATIENT)
Dept: NEUROLOGY | Facility: CLINIC | Age: 59
End: 2024-04-11
Payer: MEDICAID

## 2024-04-11 DIAGNOSIS — M54.9 DORSALGIA, UNSPECIFIED: ICD-10-CM

## 2024-04-11 DIAGNOSIS — N48.89 OTHER SPECIFIED DISORDERS OF PENIS: ICD-10-CM

## 2024-04-11 PROCEDURE — G2211 COMPLEX E/M VISIT ADD ON: CPT | Mod: NC,1L,95

## 2024-04-11 PROCEDURE — 99215 OFFICE O/P EST HI 40 MIN: CPT | Mod: 95

## 2024-04-11 NOTE — ASSESSMENT
[FreeTextEntry1] : The patient is here for evaluation of penile pain as well as testicular pain as well as penile numbness. He denies back pain. Patient's symptoms are likely due to neurological etiology. Will however obtain an MRI of the lumbosacral plexus to evaluate for any abnormalities that may be affecting that area. I am unable to do a nerve conduction EMG to evaluate for neuropathies affecting the genital area.  MRI of the lumbar spine shows s multilevel chronic Schmorl's node formation. here is trace retrolisthesis of L2 on L3, L3 on L4, and L5 on S1 which is unchanged. L2-L3: There is bilateral facet arthrosis. There is minimal disc bulge. There is minimal bilateral neural foraminal narrowing. There is no spinal canal narrowing. Findings are unchanged. L3-L4: There is minimal disc bulge. There is mild bilateral facet arthrosis. There is mild bilateral neural foraminal narrowing. There is slight flattening of the ventral thecal sac. Findings are grossly unchanged. L4-L5: There is prominence of the posterior epidural fat. There is a diffuse disc bulge. There is advanced bilateral facet arthrosis and ligamentum flavum hypertrophy. Findings result in marked effacement of the thecal sac which is grossly similar compared to the prior examination. There is moderate right and mild left neural foraminal narrowing. Findings are grossly unchanged. L5-S1: There is a mild diffuse disc bulge posterior osseous ridging. There is bilateral facet arthrosis. There is moderate left and mild right neural foraminal narrowing. There is no spinal canal narrowing. Findings are grossly unchanged.  Pain improved after kidney stent was removed MRI LS plexus to r/o plexus etiology dw pt, he will fu with PMD for osteonecrosis  I spent the time noted on the day of this patient encounter preparing for, providing and documenting the above E/M service and counseling and educate patient on differential, workup, disease course, and treatment/management. Education was provided to the patient during this encounter. All questions and concerns were answered and addressed in detail. The patient verbalized understanding and agreed to plan. Patient was advised to continue to monitor for neurologic symptoms and to notify my office or go to the nearest emergency room if there are any changes. Any orders/referrals and communications were provided as well.  Side effects of the above medications were discussed in detail including but not limited to applicable black box warning and teratogenicity as appropriate. For patients with seizures, I discussed risk of SUDEP with patient and advised that SUDEP risk can be minimized with good seizure control through medication compliance and other measures. Patient was advised to bring previous records to my office, including CD of imaging, when applicable.

## 2024-04-11 NOTE — HISTORY OF PRESENT ILLNESS
[FreeTextEntry1] : 58 m with HTN, HIV diagnosed 1992 on Biktarvy, prostate ca 2016 s/p radiation, anal cancer (dx 4/2021) s/p radiation and chemotherapy (last 8/2021) complicated by an ulcer, abscess and rectal bleedings/p loop colostomy 3/30/22 and then s/p APR with end colostomy creation, VRAM flap closure, 8/24 found to have a large abscess as well, abscess cx with clostridium, bacteroides again was admitted for GIB and pelvic/groin abscess s/p drain placement and CT also showed L ischial tuberosity osteo so pt received zosyn in the hospital and was discharged with PO levo and flagyl patient is referred here for penile numbness. The patient denies any numbness in his testicle or penis. He defecated pain chest testicles and penis. He is unable to describe. The pain is not present when he does not pass his genital area. He denies back pain. No weakness in the leg numbness and tingling in the legs. No radicular pain from the back to the legs, no groins.  The patient complains of abdominal pain is incisional site. Patient was hospitalized and had the kidney stent removed, back pain improved by 80%. No clinical change since last vist.

## 2024-04-16 ENCOUNTER — RX RENEWAL (OUTPATIENT)
Age: 59
End: 2024-04-16

## 2024-04-16 RX ORDER — OMEPRAZOLE 40 MG/1
40 CAPSULE, DELAYED RELEASE ORAL
Qty: 30 | Refills: 5 | Status: ACTIVE | COMMUNITY
Start: 2023-10-30 | End: 1900-01-01

## 2024-04-16 NOTE — PATIENT PROFILE ADULT - FALL HARM RISK - PATIENT NEEDS ASSISTANCE
Subjective: 64 y.o. male presents to the office 6 weeks s/p left medial unicompartmental knee arthroplasty performed on 03/04/2024. Pain controlled with naproxen or ibuprofen. Progressing well in physical therapy. Incision without drainage. Denies fevers or chills    Physical Exam:  Incision: well healed. Small suture abscess present to proximal aspect of incision without drainage  ROM: 0-120 without pain  5/5 IP/Q/HS/TA/GS, 2+ DP/PT, SILT DP/SP/S/S/TN    No new imaging obtained today    Assessment/Plan:  6 weeks s/p left medial unicompartmental knee arthroplasty performed on 03/04/2024 doing excellent     - continue multi-modal pain control   - Weight bearing status: as tolerated  - DVT ppx: completed  - Continue PT/OT  - F/U in 6 weeks    Scribe Attestation      I,:  Karen Preston am acting as a scribe while in the presence of the attending physician.:       I,:  Kyle Farrar DO personally performed the services described in this documentation    as scribed in my presence.:             
No assistance needed

## 2024-04-18 ENCOUNTER — LABORATORY RESULT (OUTPATIENT)
Age: 59
End: 2024-04-18

## 2024-04-18 ENCOUNTER — OUTPATIENT (OUTPATIENT)
Dept: OUTPATIENT SERVICES | Facility: HOSPITAL | Age: 59
LOS: 1 days | End: 2024-04-18

## 2024-04-18 ENCOUNTER — APPOINTMENT (OUTPATIENT)
Dept: INTERNAL MEDICINE | Facility: CLINIC | Age: 59
End: 2024-04-18

## 2024-04-18 ENCOUNTER — NON-APPOINTMENT (OUTPATIENT)
Age: 59
End: 2024-04-18

## 2024-04-18 ENCOUNTER — APPOINTMENT (OUTPATIENT)
Dept: INFECTIOUS DISEASE | Facility: CLINIC | Age: 59
End: 2024-04-18
Payer: MEDICAID

## 2024-04-18 VITALS
SYSTOLIC BLOOD PRESSURE: 145 MMHG | HEART RATE: 80 BPM | HEIGHT: 71 IN | BODY MASS INDEX: 29.82 KG/M2 | RESPIRATION RATE: 16 BRPM | WEIGHT: 213 LBS | OXYGEN SATURATION: 98 % | TEMPERATURE: 98.2 F | DIASTOLIC BLOOD PRESSURE: 94 MMHG

## 2024-04-18 DIAGNOSIS — Z96.0 PRESENCE OF UROGENITAL IMPLANTS: Chronic | ICD-10-CM

## 2024-04-18 DIAGNOSIS — N39.0 URINARY TRACT INFECTION, SITE NOT SPECIFIED: ICD-10-CM

## 2024-04-18 DIAGNOSIS — N20.0 CALCULUS OF KIDNEY: ICD-10-CM

## 2024-04-18 DIAGNOSIS — B20 HUMAN IMMUNODEFICIENCY VIRUS [HIV] DISEASE: ICD-10-CM

## 2024-04-18 DIAGNOSIS — K62.9 DISEASE OF ANUS AND RECTUM, UNSPECIFIED: Chronic | ICD-10-CM

## 2024-04-18 DIAGNOSIS — Z92.89 PERSONAL HISTORY OF OTHER MEDICAL TREATMENT: ICD-10-CM

## 2024-04-18 DIAGNOSIS — Z93.3 COLOSTOMY STATUS: Chronic | ICD-10-CM

## 2024-04-18 DIAGNOSIS — B96.5 URINARY TRACT INFECTION, SITE NOT SPECIFIED: ICD-10-CM

## 2024-04-18 DIAGNOSIS — Z98.890 OTHER SPECIFIED POSTPROCEDURAL STATES: Chronic | ICD-10-CM

## 2024-04-18 LAB
24R-OH-CALCIDIOL SERPL-MCNC: 39 NG/ML — SIGNIFICANT CHANGE UP (ref 30–80)
ALBUMIN SERPL ELPH-MCNC: 4.5 G/DL — SIGNIFICANT CHANGE UP (ref 3.3–5)
ALP SERPL-CCNC: 109 U/L — SIGNIFICANT CHANGE UP (ref 40–120)
ALT FLD-CCNC: 7 U/L — LOW (ref 10–45)
ANION GAP SERPL CALC-SCNC: 13 MMOL/L — SIGNIFICANT CHANGE UP (ref 5–17)
AST SERPL-CCNC: 13 U/L — SIGNIFICANT CHANGE UP (ref 10–40)
BILIRUB SERPL-MCNC: 0.2 MG/DL — SIGNIFICANT CHANGE UP (ref 0.2–1.2)
BUN SERPL-MCNC: 18 MG/DL — SIGNIFICANT CHANGE UP (ref 7–23)
CALCIUM SERPL-MCNC: 9.8 MG/DL — SIGNIFICANT CHANGE UP (ref 8.4–10.5)
CHLORIDE SERPL-SCNC: 104 MMOL/L — SIGNIFICANT CHANGE UP (ref 96–108)
CO2 SERPL-SCNC: 27 MMOL/L — SIGNIFICANT CHANGE UP (ref 22–31)
CREAT SERPL-MCNC: 0.93 MG/DL — SIGNIFICANT CHANGE UP (ref 0.5–1.3)
EGFR: 96 ML/MIN/1.73M2 — SIGNIFICANT CHANGE UP
GLUCOSE SERPL-MCNC: 99 MG/DL — SIGNIFICANT CHANGE UP (ref 70–99)
HCT VFR BLD CALC: 41.2 % — SIGNIFICANT CHANGE UP (ref 39–50)
HGB BLD-MCNC: 12.8 G/DL — LOW (ref 13–17)
MCHC RBC-ENTMCNC: 28.3 PG — SIGNIFICANT CHANGE UP (ref 27–34)
MCHC RBC-ENTMCNC: 31.1 GM/DL — LOW (ref 32–36)
MCV RBC AUTO: 90.9 FL — SIGNIFICANT CHANGE UP (ref 80–100)
PLATELET # BLD AUTO: 306 K/UL — SIGNIFICANT CHANGE UP (ref 150–400)
POTASSIUM SERPL-MCNC: 4.9 MMOL/L — SIGNIFICANT CHANGE UP (ref 3.5–5.3)
POTASSIUM SERPL-SCNC: 4.9 MMOL/L — SIGNIFICANT CHANGE UP (ref 3.5–5.3)
PROT SERPL-MCNC: 7.6 G/DL — SIGNIFICANT CHANGE UP (ref 6–8.3)
RBC # BLD: 4.53 M/UL — SIGNIFICANT CHANGE UP (ref 4.2–5.8)
RBC # FLD: 14.6 % — HIGH (ref 10.3–14.5)
SODIUM SERPL-SCNC: 143 MMOL/L — SIGNIFICANT CHANGE UP (ref 135–145)
WBC # BLD: 7.76 K/UL — SIGNIFICANT CHANGE UP (ref 3.8–10.5)
WBC # FLD AUTO: 7.76 K/UL — SIGNIFICANT CHANGE UP (ref 3.8–10.5)

## 2024-04-18 PROCEDURE — 86359 T CELLS TOTAL COUNT: CPT

## 2024-04-18 PROCEDURE — 80053 COMPREHEN METABOLIC PANEL: CPT

## 2024-04-18 PROCEDURE — 82306 VITAMIN D 25 HYDROXY: CPT

## 2024-04-18 PROCEDURE — 85027 COMPLETE CBC AUTOMATED: CPT

## 2024-04-18 PROCEDURE — 86360 T CELL ABSOLUTE COUNT/RATIO: CPT

## 2024-04-18 PROCEDURE — 99215 OFFICE O/P EST HI 40 MIN: CPT

## 2024-04-18 PROCEDURE — 87536 HIV-1 QUANT&REVRSE TRNSCRPJ: CPT

## 2024-04-18 PROCEDURE — G0463: CPT

## 2024-04-18 NOTE — REVIEW OF SYSTEMS
[Fever] : no fever [Chills] : no chills [Eye Pain] : no eye pain [Red Eyes] : eyes not red [Earache] : no earache [Loss Of Hearing] : no hearing loss [Chest Pain] : no chest pain [Palpitations] : no palpitations [Shortness Of Breath] : no shortness of breath [Wheezing] : no wheezing [Vomiting] : no vomiting [Joint Swelling] : no joint swelling [Suicidal] : not suicidal [Easy Bleeding] : no tendency for easy bleeding [Easy Bruising] : no tendency for easy bruising [Negative] : Heme/Lymph [FreeTextEntry8] : has irritation at both nephrostomies L>R  [FreeTextEntry9] : back pain [de-identified] : rash on feet and also macular on torso [de-identified] : forgetful and sleepy

## 2024-04-18 NOTE — ASSESSMENT
[FreeTextEntry1] : 58 m with HTN, HIV diagnosed 1992 on Biktarvy, prostate ca 2016 s/p radiation, anal cancer (dx 4/2021) s/p radiation and chemotherapy (last 8/2021) complicated by an ulcer, abscess and rectal bleeding s/p loop colostomy 3/30/22 and then s/p APR with end colostomy creation, VRAM flap closure, 8/24 found to have a large abscess as well, abscess cx with clostridium, bacteroides again was admitted for GIB and pelvic/groin abscess s/p drain placement and CT also showed L ischial tuberosity osteo so pt received zosyn in the hospital and was discharged with PO levo and flagyl to finish a 6 week course he was admitted 11/2022 with hematuria and hydro, urine cx and AFB were negative, s/p b/l nephrostomy, and then had laser lithotripsy and stent placement 1/25/23, the nephrostomies were clamped and removed now with b/l stents, he still had severe pain while sitting down at the flap site, dysuria and penile/scrotal numbness, pt had multiple ER visits for pain but urine cx negative, he c/o penile numbness 8/2023 so LS spine and pelvic MRI was done 9/22 which showed 1.5 cm fat rim-enhancing collection at the left posterolateral aspect of the perineal flap most likely representing a small abscess, then pt developed fever, abd pain, back pain, dysuria and was admitted with sepsis, proteus UTI CT: redemonstrated moderate hydronephrosis with bilateral urothelial thickening and adjacent inflammatory stranding, which may be due to reactive inflammation or ascending UTI s/p 2 weeks of zosym => augmentin then had a rash that was being followed by derm s/p biopsy and showed syphilis, pt was treated for syphilis multiple times before and this does not appear to be a new infection either (as pt has not been sexually active, in and out of hospital for surgeries with RPR 1:1) but still treated with a bicillin and now rash improved also hospitalized for pseudomonas UTI R to cipro/levo s/p zosyn had a recent urine cx from nephrostomy bag at PMD and pt was told he needs to go the hospital for antibiotics but now here for f/u, doing well, no fever, vomiting, just has irritation at the nephrostomy exit site   HIV on Biktarvy, VL:UD, CD4 608 c/w biktarvy and repeat VL, CD4   rash s/p biopsy by derm which showed syphilis 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, will repeat RPR later  anal Ca s/p chemo and RT with rectal ulcer, abscess and bleed s/p loop colostomy 3/30/22 and then s/p APR with end colostomy creation, VRAM flap closure, 8/24 found to have a large abscess as well, abscess cx with clostridium, bacteroides again was admitted for GIB and pelvic/groin abscess s/p drain placement and CT also showed L ischial tuberosity osteo so pt received zosyn in the hospital and was discharged with PO levo and flagyl to finish a 6 week course pelvic MRI 9/2023 showed a small abscess in the flap, again s/p a 2 week course of zosyn, augmentin also had  penile and scrotal numbness with incontinency but MRI unremarkable and now better  hematuria, hydro, nephrolithiasis admitted 11/2022 with hematuria and hydro, urine cx and AFB were negative, s/p b/l nephrostomy, and then had laser lithotripsy and stent placement 1/25/23, the nephrostomies were clamped and removed now with b/l stents multiple UTIs after, last urine cx negative and going for cysoscopy and stent exchange in 2 days but has some dysuria, and last urine showed pseudomonas I to cipro/levo and urology gave him augmentin then was admitted for abd pain s/p cytoscopy and stent remova/adjustment and b/l nephrostomy, urine cx again with pseudomonas but R to cipro/levo, s/p a course of zosyn had urine cx from nephrostomy bag which showed >3 organisms and means contamination/colonization, pt does not have clinical evidence of pyleo, no treatment necessary now  HTN, follows with medicine and medications were just adjusted but still has elevated BP, will follow with medicine  HCM: s/p flu and COVID vaccine s/p prevnar 2022, pneumovax 2023 f/u with dental and ophthalmology.

## 2024-04-19 ENCOUNTER — EMERGENCY (EMERGENCY)
Facility: HOSPITAL | Age: 59
LOS: 0 days | Discharge: HOME | End: 2024-04-19
Attending: STUDENT IN AN ORGANIZED HEALTH CARE EDUCATION/TRAINING PROGRAM
Payer: MEDICAID

## 2024-04-19 VITALS
WEIGHT: 212.97 LBS | RESPIRATION RATE: 18 BRPM | DIASTOLIC BLOOD PRESSURE: 85 MMHG | TEMPERATURE: 99 F | SYSTOLIC BLOOD PRESSURE: 128 MMHG | OXYGEN SATURATION: 96 % | HEIGHT: 71 IN | HEART RATE: 87 BPM

## 2024-04-19 VITALS
SYSTOLIC BLOOD PRESSURE: 121 MMHG | RESPIRATION RATE: 18 BRPM | HEART RATE: 87 BPM | OXYGEN SATURATION: 96 % | TEMPERATURE: 98 F | DIASTOLIC BLOOD PRESSURE: 85 MMHG

## 2024-04-19 DIAGNOSIS — R10.9 UNSPECIFIED ABDOMINAL PAIN: ICD-10-CM

## 2024-04-19 DIAGNOSIS — Z87.440 PERSONAL HISTORY OF URINARY (TRACT) INFECTIONS: ICD-10-CM

## 2024-04-19 DIAGNOSIS — E78.00 PURE HYPERCHOLESTEROLEMIA, UNSPECIFIED: ICD-10-CM

## 2024-04-19 DIAGNOSIS — K62.9 DISEASE OF ANUS AND RECTUM, UNSPECIFIED: Chronic | ICD-10-CM

## 2024-04-19 DIAGNOSIS — C21.0 MALIGNANT NEOPLASM OF ANUS, UNSPECIFIED: ICD-10-CM

## 2024-04-19 DIAGNOSIS — Z93.3 COLOSTOMY STATUS: Chronic | ICD-10-CM

## 2024-04-19 DIAGNOSIS — Z96.0 PRESENCE OF UROGENITAL IMPLANTS: Chronic | ICD-10-CM

## 2024-04-19 DIAGNOSIS — Z98.890 OTHER SPECIFIED POSTPROCEDURAL STATES: Chronic | ICD-10-CM

## 2024-04-19 DIAGNOSIS — B20 HUMAN IMMUNODEFICIENCY VIRUS [HIV] DISEASE: ICD-10-CM

## 2024-04-19 DIAGNOSIS — R31.9 HEMATURIA, UNSPECIFIED: ICD-10-CM

## 2024-04-19 DIAGNOSIS — I10 ESSENTIAL (PRIMARY) HYPERTENSION: ICD-10-CM

## 2024-04-19 DIAGNOSIS — N39.0 URINARY TRACT INFECTION, SITE NOT SPECIFIED: ICD-10-CM

## 2024-04-19 DIAGNOSIS — Z93.6 OTHER ARTIFICIAL OPENINGS OF URINARY TRACT STATUS: ICD-10-CM

## 2024-04-19 LAB
4/8 RATIO: 0.32 RATIO — LOW (ref 0.9–3.6)
ABS CD8: 1727 CELLS/UL — HIGH (ref 142–740)
ALBUMIN SERPL ELPH-MCNC: 3.1 G/DL — LOW (ref 3.3–5)
ALP SERPL-CCNC: 92 U/L — SIGNIFICANT CHANGE UP (ref 40–120)
ALT FLD-CCNC: 16 U/L — SIGNIFICANT CHANGE UP (ref 12–78)
ANION GAP SERPL CALC-SCNC: 2 MMOL/L — LOW (ref 5–17)
APPEARANCE UR: ABNORMAL
APTT BLD: 34.6 SEC — SIGNIFICANT CHANGE UP (ref 24.5–35.6)
AST SERPL-CCNC: 16 U/L — SIGNIFICANT CHANGE UP (ref 15–37)
BACTERIA # UR AUTO: ABNORMAL /HPF
BASOPHILS # BLD AUTO: 0.04 K/UL — SIGNIFICANT CHANGE UP (ref 0–0.2)
BASOPHILS NFR BLD AUTO: 0.6 % — SIGNIFICANT CHANGE UP (ref 0–2)
BILIRUB SERPL-MCNC: 0.2 MG/DL — SIGNIFICANT CHANGE UP (ref 0.2–1.2)
BILIRUB UR-MCNC: NEGATIVE — SIGNIFICANT CHANGE UP
BUN SERPL-MCNC: 17 MG/DL — SIGNIFICANT CHANGE UP (ref 7–23)
CALCIUM SERPL-MCNC: 9.1 MG/DL — SIGNIFICANT CHANGE UP (ref 8.5–10.1)
CD3 BLASTS SPEC-ACNC: 2270 CELLS/UL — HIGH (ref 672–1870)
CD3 BLASTS SPEC-ACNC: 84 % — HIGH (ref 59–83)
CD4 %: 20 % — LOW (ref 30–62)
CD8 %: 64 % — HIGH (ref 12–36)
CHLORIDE SERPL-SCNC: 111 MMOL/L — HIGH (ref 96–108)
CO2 SERPL-SCNC: 30 MMOL/L — SIGNIFICANT CHANGE UP (ref 22–31)
COD CRY URNS QL: PRESENT
COLOR SPEC: YELLOW — SIGNIFICANT CHANGE UP
CREAT SERPL-MCNC: 0.83 MG/DL — SIGNIFICANT CHANGE UP (ref 0.5–1.3)
DIFF PNL FLD: ABNORMAL
EGFR: 102 ML/MIN/1.73M2 — SIGNIFICANT CHANGE UP
EOSINOPHIL # BLD AUTO: 0.03 K/UL — SIGNIFICANT CHANGE UP (ref 0–0.5)
EOSINOPHIL NFR BLD AUTO: 0.5 % — SIGNIFICANT CHANGE UP (ref 0–6)
EPI CELLS # UR: PRESENT
GLUCOSE SERPL-MCNC: 96 MG/DL — SIGNIFICANT CHANGE UP (ref 70–99)
GLUCOSE UR QL: NEGATIVE MG/DL — SIGNIFICANT CHANGE UP
HCT VFR BLD CALC: 37.5 % — LOW (ref 39–50)
HGB BLD-MCNC: 12.5 G/DL — LOW (ref 13–17)
IMM GRANULOCYTES NFR BLD AUTO: 0.3 % — SIGNIFICANT CHANGE UP (ref 0–0.9)
INR BLD: 0.96 RATIO — SIGNIFICANT CHANGE UP (ref 0.85–1.18)
KETONES UR-MCNC: NEGATIVE MG/DL — SIGNIFICANT CHANGE UP
LEUKOCYTE ESTERASE UR-ACNC: ABNORMAL
LYMPHOCYTES # BLD AUTO: 2.58 K/UL — SIGNIFICANT CHANGE UP (ref 1–3.3)
LYMPHOCYTES # BLD AUTO: 40.1 % — SIGNIFICANT CHANGE UP (ref 13–44)
MCHC RBC-ENTMCNC: 29.3 PG — SIGNIFICANT CHANGE UP (ref 27–34)
MCHC RBC-ENTMCNC: 33.3 G/DL — SIGNIFICANT CHANGE UP (ref 32–36)
MCV RBC AUTO: 88 FL — SIGNIFICANT CHANGE UP (ref 80–100)
MONOCYTES # BLD AUTO: 0.82 K/UL — SIGNIFICANT CHANGE UP (ref 0–0.9)
MONOCYTES NFR BLD AUTO: 12.8 % — SIGNIFICANT CHANGE UP (ref 2–14)
NEUTROPHILS # BLD AUTO: 2.94 K/UL — SIGNIFICANT CHANGE UP (ref 1.8–7.4)
NEUTROPHILS NFR BLD AUTO: 45.7 % — SIGNIFICANT CHANGE UP (ref 43–77)
NITRITE UR-MCNC: NEGATIVE — SIGNIFICANT CHANGE UP
NRBC # BLD: 0 /100 WBCS — SIGNIFICANT CHANGE UP (ref 0–0)
PH UR: 6.5 — SIGNIFICANT CHANGE UP (ref 5–8)
PLATELET # BLD AUTO: 251 K/UL — SIGNIFICANT CHANGE UP (ref 150–400)
POTASSIUM SERPL-MCNC: 3.8 MMOL/L — SIGNIFICANT CHANGE UP (ref 3.5–5.3)
POTASSIUM SERPL-SCNC: 3.8 MMOL/L — SIGNIFICANT CHANGE UP (ref 3.5–5.3)
PROT SERPL-MCNC: 7.2 GM/DL — SIGNIFICANT CHANGE UP (ref 6–8.3)
PROT UR-MCNC: 300 MG/DL
PROTHROM AB SERPL-ACNC: 11.5 SEC — SIGNIFICANT CHANGE UP (ref 9.5–13)
RBC # BLD: 4.26 M/UL — SIGNIFICANT CHANGE UP (ref 4.2–5.8)
RBC # FLD: 14.4 % — SIGNIFICANT CHANGE UP (ref 10.3–14.5)
RBC CASTS # UR COMP ASSIST: 25 /HPF — HIGH (ref 0–4)
SODIUM SERPL-SCNC: 143 MMOL/L — SIGNIFICANT CHANGE UP (ref 135–145)
SP GR SPEC: 1.02 — SIGNIFICANT CHANGE UP (ref 1–1.03)
T-CELL CD4 SUBSET PNL BLD: 548 CELLS/UL — SIGNIFICANT CHANGE UP (ref 489–1457)
UROBILINOGEN FLD QL: 0.2 MG/DL — SIGNIFICANT CHANGE UP (ref 0.2–1)
WBC # BLD: 6.43 K/UL — SIGNIFICANT CHANGE UP (ref 3.8–10.5)
WBC # FLD AUTO: 6.43 K/UL — SIGNIFICANT CHANGE UP (ref 3.8–10.5)
WBC UR QL: 10 /HPF — HIGH (ref 0–5)

## 2024-04-19 PROCEDURE — 99285 EMERGENCY DEPT VISIT HI MDM: CPT

## 2024-04-19 PROCEDURE — 50435 EXCHANGE NEPHROSTOMY CATH: CPT | Mod: 50

## 2024-04-19 PROCEDURE — 74176 CT ABD & PELVIS W/O CONTRAST: CPT | Mod: 26,MC

## 2024-04-19 RX ORDER — SODIUM CHLORIDE 9 MG/ML
1000 INJECTION INTRAMUSCULAR; INTRAVENOUS; SUBCUTANEOUS ONCE
Refills: 0 | Status: COMPLETED | OUTPATIENT
Start: 2024-04-19 | End: 2024-04-19

## 2024-04-19 RX ORDER — ACETAMINOPHEN 500 MG
1000 TABLET ORAL ONCE
Refills: 0 | Status: COMPLETED | OUTPATIENT
Start: 2024-04-19 | End: 2024-04-19

## 2024-04-19 RX ORDER — MORPHINE SULFATE 50 MG/1
4 CAPSULE, EXTENDED RELEASE ORAL ONCE
Refills: 0 | Status: DISCONTINUED | OUTPATIENT
Start: 2024-04-19 | End: 2024-04-19

## 2024-04-19 RX ADMIN — Medication 400 MILLIGRAM(S): at 10:56

## 2024-04-19 RX ADMIN — SODIUM CHLORIDE 1000 MILLILITER(S): 9 INJECTION INTRAMUSCULAR; INTRAVENOUS; SUBCUTANEOUS at 10:56

## 2024-04-19 RX ADMIN — MORPHINE SULFATE 4 MILLIGRAM(S): 50 CAPSULE, EXTENDED RELEASE ORAL at 11:49

## 2024-04-19 RX ADMIN — SODIUM CHLORIDE 1000 MILLILITER(S): 9 INJECTION INTRAMUSCULAR; INTRAVENOUS; SUBCUTANEOUS at 11:49

## 2024-04-19 RX ADMIN — MORPHINE SULFATE 4 MILLIGRAM(S): 50 CAPSULE, EXTENDED RELEASE ORAL at 11:27

## 2024-04-19 RX ADMIN — Medication 1000 MILLIGRAM(S): at 11:49

## 2024-04-19 RX ADMIN — Medication 1000 MILLIGRAM(S): at 11:23

## 2024-04-19 NOTE — ED ADULT NURSE NOTE - ISOLATION TYPE:
Patient arrives from home reporting chest pain that come and go. Patient states \"my father is having heart problems so I thought I should get mine checked with my age. \" Also reports urinary frequency and leaking.
None

## 2024-04-19 NOTE — ED ADULT NURSE NOTE - OBJECTIVE STATEMENT
pt is AOX4 present to the ED c/o left flank and hematuria in left nephrostomy since yesterday. pt noted with b/o nephrostomy and left side colostomy. history htn, HIv and high cholesterol.

## 2024-04-19 NOTE — ED PROVIDER NOTE - PATIENT PORTAL LINK FT
You can access the FollowMyHealth Patient Portal offered by [facility] by registering at the following website: [email or web address]. By joining [facility]’s FollowMyHealth portal, you will also be able to view your health information using other applications (apps) compatible with our system.

## 2024-04-19 NOTE — ED PROVIDER NOTE - NSFOLLOWUPINSTRUCTIONS_ED_ALL_ED_FT
Rest, drink plenty of fluids.  Advance activity as tolerated.  Continue all previously prescribed medications as directed.  Follow up with urologist this week- bring copies of your results.  Return to the ER for worsening or persistent symptoms, and/or ANY NEW OR CONCERNING SYMPTOMS. If you have issues obtaining follow up, please call: [phone] to obtain a doctor or specialist who takes your insurance in your area.  You may call [phone] to make an appointment with the internal medicine clinic.

## 2024-04-19 NOTE — CONSULT NOTE ADULT - SUBJECTIVE AND OBJECTIVE BOX
Patient is a 58y old  Male who presents with a chief complaint of     HPI: 58 years old male with h/o HTN, HLD, HIV on HAART ( CD 4 608, VL undetectable on 24), h/o jared Ca (s/p chemo, radio therapy, surgery, colostomy 2022), prostate Ca s/p radiotherapy, ureteral stricture s/p stent ( most recent exchange with Dr Mathew on 2023), multiple psuedomonas UTI, s/p B/l PCN placement by IR  b/l hydronephrosis despite indwelling metal stents as well as recurrent pseudomonas UTI, s/p cysto with stent removal 24 presented to er with left flank pain and hematuria from PCN. denies fevers, chills, n/v, abd pain. flushing the PCN's daily. IR aware of pt, and performed bilateral PCN exchange today. now pain has resolved and without hematuria     PAST MEDICAL & SURGICAL HISTORY:  HTN (hypertension)  HLD (hyperlipidemia)  HIV infection  Anxiety  BPH (benign prostatic hyperplasia)  History of anal cancer  Anal lesion  S/P colostomy  History of gastroscopy  Ureteral stent present          Review of Systems:  Negative except  as above in HPI    MEDICATIONS  (STANDING):    MEDICATIONS  (PRN):      Allergies    No Known Allergies                  FAMILY HISTORY:  FH: prostate cancer    FH: breast cancer        Vital Signs Last 24 Hrs  T(C): 37.1 (2024 09:33), Max: 37.1 (2024 09:33)  T(F): 98.7 (2024 09:33), Max: 98.7 (2024 09:33)  HR: 87 (2024 09:33) (87 - 87)  BP: 128/85 (2024 09:33) (128/85 - 128/85)  BP(mean): --  RR: 18 (2024 09:33) (18 - 18)  SpO2: 96% (2024 09:33) (96% - 96%)    Parameters below as of 2024 09:33  Patient On (Oxygen Delivery Method): room air        Physical Exam:  General:  Appears stated age, well-groomed, well-nourished, no distress  Eyes: EOMI, conjunctiva clear  HENT:  WNL, no JVD  Chest: no respiratory distress, no accessory muscle use  : bilateral PCN's inplace draining clear yellow urine  Extremities:  no pedal edema or calf tenderness noted  Skin:  No rash  Neuro/Psych:  Alert, oriented to time, place and person     LABS:                        12.5   6.43  )-----------( 251      ( 2024 10:37 )             37.5     -    143  |  111<H>  |  17  ----------------------------<  96  3.8   |  30  |  0.83    Ca    9.1      2024 10:37    TPro  7.2  /  Alb  3.1<L>  /  TBili  0.2  /  DBili  x   /  AST  16  /  ALT  16  /  AlkPhos  92  -    PT/INR - ( 2024 10:57 )   PT: 11.5 sec;   INR: 0.96 ratio         PTT - ( 2024 10:57 )  PTT:34.6 sec  Urinalysis Basic - ( 2024 10:37 )    Color: Yellow / Appearance: Turbid / S.022 / pH: x  Gluc: 96 mg/dL / Ketone: Negative mg/dL  / Bili: Negative / Urobili: 0.2 mg/dL   Blood: x / Protein: 300 mg/dL / Nitrite: Negative   Leuk Esterase: Large / RBC: 25 /HPF / WBC 10 /HPF   Sq Epi: x / Non Sq Epi: x / Bacteria: Many /HPF        A/P:58 years old male with h/o HTN, HLD, HIV on HAART ( CD 4 608, VL undetectable on 24) h/o jared Ca (s/p chemo, radio therapy, surgery, colostomy 2022), prostate Ca s/p radiotherapy, ureteral stricture s/p stent c/b persistent hydro and recurrent psuedomonas UTI's, s/p IR bilateral PCN on  and cysto with stent removal on  presented to er with left flank pain and hematuria.  now s/p IR bilateral PCN exchange. pain and hematuria has resolved.   pt given appropriate supplies for bilateral PCN care while he waits for new shipment of supplies from homecare service  -d/c home with urology Follow up and IR follow up for routine PCN exchanges (q 3 months)
Interventional Radiology    Evaluate for Procedure: b/l nephrostomy exchange    HPI: 58y Male with h/o HTN, HLD, HIV on HAART ( CD 4 608, VL undetectable on 2/12/24), h/o jared Ca (s/p chemo, radio therapy, surgery, colostomy 03/2022), prostate Ca s/p radiotherapy, ureteral stricture s/p stent ( most recent exchange with Dr Mathew on 12/13/2023), recent admission with UTI ( urine culture with pseudomonas) s/p bilateral nehprostomy tube placement on 2/24 here with left flank pain since placement of nephrostomy and hematuria since yesterday.     Allergies: No Known Allergies    Medications (Abx/Cardiac/Anticoagulation/Blood Products)      Data:  180.3  96.57  T(C): 37.1  HR: 87  BP: 128/85  RR: 18  SpO2: 96%    -WBC 6.43 / HgB 12.5 / Hct 37.5 / Plt 251  -Na 143 / Cl 111 / BUN 17 / Glucose 96  -K 3.8 / CO2 30 / Cr 0.83  -ALT 16 / Alk Phos 92 / T.Bili 0.2  -INR 0.96 / PTT 34.6    Radiology: Reviewed with Dr. Brand    Assessment/Plan:   -58y Male with h/o HTN, HLD, HIV on HAART ( CD 4 608, VL undetectable on 2/12/24), h/o jared Ca (s/p chemo, radio therapy, surgery, colostomy 03/2022), prostate Ca s/p radiotherapy, ureteral stricture s/p stent ( most recent exchange with Dr Mathew on 12/13/2023), recent admission with UTI ( urine culture with pseudomonas) s/p bilateral nehprostomy tube placement on 2/24 here with left flank pain since placement of nephrostomy and hematuria since yesterday.       - case and imaging reviewed with Dr. Brand  - Patient seen and examined bedside  - Will plan for bilateral nephrostomy tube exchange today  - IR procedure order under Dr. Brand  - Labs from yesterday reviewed and appropriate.   - does not need to be NPO  - d/w ED

## 2024-04-19 NOTE — ED PROVIDER NOTE - ATTENDING APP SHARED VISIT CONTRIBUTION OF CARE
58M pmhx HLD. HTN, HIV on HAART, anal ca sp colostomy/chemo/RT, prostate ca sp RT, ureteral stricture sp stent, b/l nephrostomy tubes placed feb 2024, pw dysuria and left flank pain - reports b/l nephrostomy tubes are draining adequately but noted scant hematuria yday. Denies fever, abd pain, chest pain, sob.   exam - pt aox3, nad, heart rrr, lungs cta b/l, abd soft ntnd, b/l nephrostomy tubes in place with yellow urine b/l, no neuro deficits.   plan - IR c/s for nephrostomy tube exchange Left, urology c/s, labs rule out renal dysfcn, ct a/p eval nephrostomy tube location   - L nephrostomy exchanged by IR, UA was from contaminated sample from old nephrostomy tubes - will not treat UA, outpt follow up, return precautions disusesed 58M pmhx HLD. HTN, HIV on HAART, anal ca sp colostomy/chemo/RT, prostate ca sp RT, ureteral stricture sp stent, b/l nephrostomy tubes placed feb 2024, pw dysuria and left flank pain - reports b/l nephrostomy tubes are draining adequately but noted scant hematuria yday. Denies fever, abd pain, chest pain, sob.   exam - pt aox3, nad, heart rrr, lungs cta b/l, abd soft ntnd, b/l nephrostomy tubes in place with yellow urine b/l, no neuro deficits.   plan - IR c/s for nephrostomy tube exchange b/l. urology c/s, labs rule out renal dysfcn, ct a/p eval nephrostomy tube location   - L nephrostomy exchanged by IR, UA was from contaminated sample from old nephrostomy tubes - will not treat UA, outpt follow up, return precautions disusesed

## 2024-04-19 NOTE — ED PROVIDER NOTE - OBJECTIVE STATEMENT
57 y/o male with  h/o HTN, HLD, HIV on HAART ( CD 4 608, VL undetectable on 2/12/24), h/o jared Ca (s/p chemo, radio therapy, surgery, colostomy 03/2022), prostate Ca s/p radiotherapy, ureteral stricture s/p stent ( most recent exchange with Dr Mathew on 12/13/2023), recent admission with UTI ( urine culture with pseudomonas) s/p bilateral nehprostomy tube placement on 2/24 here with left flank pain since placement of nephrostomy and hematuria since yesterday. Pt noted red urine yesterday and increase pain. Denies fever, vomiting, abdominal pain. Pt spoke with urologist and told to come to the ED. Pt denies diarrhea, changes in stool. Pt was recently treated with uti 2 weeks ago on cipro 59 y/o male with  h/o HTN, HLD, HIV on HAART ( CD 4 608, VL undetectable on 2/12/24), h/o jared Ca (s/p chemo, radio therapy, surgery, colostomy 03/2022), prostate Ca s/p radiotherapy, ureteral stricture s/p stent ( most recent exchange with Dr Mathew on 12/13/2023), recent admission with UTI ( urine culture with pseudomonas) s/p bilateral nephrostomy tube placement on 2/24 here with left flank pain since placement of nephrostomy and hematuria since yesterday. Pt noted red urine yesterday and increase pain. Denies fever, vomiting, abdominal pain. Pt spoke with urologist and told to come to the ED. Pt denies diarrhea, changes in stool. Pt was recently treated with uti 2 weeks ago on cipro

## 2024-04-19 NOTE — CONSULT NOTE ADULT - TIME-BASE BILLING FOR NON-FACE-TO-FACE CONSULT (MINUTES)
He was on losartan 50 mg in the past– do you know why this was stopped or changed  Please call patient and ask him and if he can tolerate the losartan 50 mg we can restart that 21-30

## 2024-04-19 NOTE — PROCEDURE NOTE - PROCEDURE FINDINGS AND DETAILS
CT reviewed, no surrounding hematoma, both drains slightly retracted from prior, likely explanation of pain. Bilateral PCN exchange performed using sterile technique.   Sample sent from each side. Clear urine at end of procedure.

## 2024-04-19 NOTE — ED PROVIDER NOTE - CLINICAL SUMMARY MEDICAL DECISION MAKING FREE TEXT BOX
59 y/o male with history anal ca s/p chemo/rad, prostate ca, htn, high chol, hiv on haart s/p bilateral nephrostomy tube placement 2/24 here with left flank pain and hematuria   Vs stable.   Will obtain labs, UA r/o UTI. IR team at bedside and already aware pt is in the ED and will exchange nephrostomy tube. Recommend ct a/p noncontrast. 59 y/o male with history anal ca s/p chemo/rad, prostate ca, htn, high chol, hiv on haart s/p bilateral nephrostomy tube placement 2/24 here with left flank pain and hematuria   Vs stable.   Will obtain labs, UA r/o UTI. IR team at bedside and already aware pt is in the ED and will exchange nephrostomy tube. Recommend ct a/p noncontrast.    labs reviewed and unremarkable. UA negative for UTI. 57 y/o male with history anal ca s/p chemo/rad, prostate ca, htn, high chol, hiv on haart s/p bilateral nephrostomy tube placement 2/24 here with left flank pain and hematuria   Vs stable.   Will obtain labs, UA r/o UTI. IR team at bedside and already aware pt is in the ED and will exchange nephrostomy tube. Recommend ct a/p noncontrast.    labs reviewed and unremarkable. UA  shows uti however given from old nephrostomy tubes like contaminated.   Tubes exchanged by IR and new UC sent.   Urology was also consulted and urology saw pt,  pt can be discharged home and follow up with urologist in office. Strict return precautions given.

## 2024-04-19 NOTE — ED PROVIDER NOTE - PHYSICAL EXAMINATION
GEN: Awake, alert, interactive, NAD.  HEAD AND NECK: NC/AT. Airway patent. Neck supple.   EYES:  Clear b/l. EOMI. PERRL.   ENT: Moist mucus membranes.   CARDIAC: Regular rate, regular rhythm. No evident pedal edema.    RESP/CHEST: Normal respiratory effort with no use of accessory muscles or retractions. Clear throughout on auscultation.  ABD: soft, non-distended, (-) no tenderness top palpation. No rebound, no guarding.   BACK: No midline spinal TTP. Mild left CVA   EXTREMITIES: Moving all extremities with no apparent deformities.   SKIN: Warm, dry, intact normal color. No rash.   NEURO: AOx3, CN II-XII grossly intact, no focal deficits.   PSYCH: Appropriate mood and affect. GEN: Awake, alert, interactive, NAD.  HEAD AND NECK: NC/AT. Airway patent. Neck supple.   EYES:  Clear b/l. EOMI. PERRL.   ENT: Moist mucus membranes.   CARDIAC: Regular rate, regular rhythm. No evident pedal edema.    RESP/CHEST: Normal respiratory effort with no use of accessory muscles or retractions. Clear throughout on auscultation.  ABD: soft, non-distended, (-) no tenderness top palpation. (+) ostomy in place. No rebound, no guarding.   BACK: No midline spinal TTP. Mild left CVA   EXTREMITIES: Moving all extremities with no apparent deformities.   SKIN: Warm, dry, intact normal color. No rash.   NEURO: AOx3, CN II-XII grossly intact, no focal deficits.   PSYCH: Appropriate mood and affect.

## 2024-04-19 NOTE — ED PROVIDER NOTE - NS ED ROS FT
CONSTITUTIONAL: No fever, no chills, no fatigue  EYES: No visual changes  ENT: No ear pain, no sore throat  CARDIOVASCULAR: No chest pain, no palpitations  RESPIRATORY: No cough, no SOB  GI: No abdominal pain, no nausea, no vomiting, no constipation, no diarrhea  GENITOURINARY: No dysuria, no frequency.   MUSKULOSKELETAL: No backpain, no joint pain, no myalgias  SKIN: No rash  NEURO: No headache    ALL OTHER SYSTEMS NEGATIVE.

## 2024-04-19 NOTE — ED ADULT TRIAGE NOTE - CHIEF COMPLAINT QUOTE
patient had bilateral Nephrostomy tube started Feb 2024, now c/o pain in the left flank area started worse yesterday. Also c/o hematuria yesterday in the drainage bag

## 2024-04-20 LAB
CULTURE RESULTS: SIGNIFICANT CHANGE UP
HIV-1 VIRAL LOAD RESULT: SIGNIFICANT CHANGE UP
HIV1 RNA # SERPL NAA+PROBE: SIGNIFICANT CHANGE UP COPIES/ML
HIV1 RNA SER-IMP: SIGNIFICANT CHANGE UP
HIV1 RNA SERPL NAA+PROBE-ACNC: SIGNIFICANT CHANGE UP
HIV1 RNA SERPL NAA+PROBE-LOG#: SIGNIFICANT CHANGE UP LG COP/ML
SPECIMEN SOURCE: SIGNIFICANT CHANGE UP

## 2024-04-21 LAB
-  AMOXICILLIN/CLAVULANIC ACID: SIGNIFICANT CHANGE UP
-  AMPICILLIN/SULBACTAM: SIGNIFICANT CHANGE UP
-  AMPICILLIN: SIGNIFICANT CHANGE UP
-  AZTREONAM: SIGNIFICANT CHANGE UP
-  CEFAZOLIN: SIGNIFICANT CHANGE UP
-  CEFEPIME: SIGNIFICANT CHANGE UP
-  CEFOXITIN: SIGNIFICANT CHANGE UP
-  CEFTRIAXONE: SIGNIFICANT CHANGE UP
-  CIPROFLOXACIN: SIGNIFICANT CHANGE UP
-  ERTAPENEM: SIGNIFICANT CHANGE UP
-  GENTAMICIN: SIGNIFICANT CHANGE UP
-  IMIPENEM: SIGNIFICANT CHANGE UP
-  LEVOFLOXACIN: SIGNIFICANT CHANGE UP
-  MEROPENEM: SIGNIFICANT CHANGE UP
-  PIPERACILLIN/TAZOBACTAM: SIGNIFICANT CHANGE UP
-  TOBRAMYCIN: SIGNIFICANT CHANGE UP
-  TRIMETHOPRIM/SULFAMETHOXAZOLE: SIGNIFICANT CHANGE UP
METHOD TYPE: SIGNIFICANT CHANGE UP

## 2024-04-22 LAB
-  AMPICILLIN: SIGNIFICANT CHANGE UP
-  CIPROFLOXACIN: SIGNIFICANT CHANGE UP
-  LEVOFLOXACIN: SIGNIFICANT CHANGE UP
-  TETRACYCLINE: SIGNIFICANT CHANGE UP
-  VANCOMYCIN: SIGNIFICANT CHANGE UP
METHOD TYPE: SIGNIFICANT CHANGE UP

## 2024-04-23 LAB
-  AMIKACIN: SIGNIFICANT CHANGE UP
-  AMIKACIN: SIGNIFICANT CHANGE UP
-  AMOXICILLIN/CLAVULANIC ACID: SIGNIFICANT CHANGE UP
-  AMPICILLIN/SULBACTAM: SIGNIFICANT CHANGE UP
-  AMPICILLIN: SIGNIFICANT CHANGE UP
-  AZTREONAM: SIGNIFICANT CHANGE UP
-  CEFAZOLIN: SIGNIFICANT CHANGE UP
-  CEFEPIME: SIGNIFICANT CHANGE UP
-  CEFOXITIN: SIGNIFICANT CHANGE UP
-  CEFTAZIDIME/AVIBACTAM: SIGNIFICANT CHANGE UP
-  CEFTAZIDIME: SIGNIFICANT CHANGE UP
-  CEFTAZIDIME: SIGNIFICANT CHANGE UP
-  CEFTOLOZANE/TAZOBACTAM: SIGNIFICANT CHANGE UP
-  CEFTRIAXONE: SIGNIFICANT CHANGE UP
-  CIPROFLOXACIN: SIGNIFICANT CHANGE UP
-  ERTAPENEM: SIGNIFICANT CHANGE UP
-  GENTAMICIN: SIGNIFICANT CHANGE UP
-  IMIPENEM: SIGNIFICANT CHANGE UP
-  LEVOFLOXACIN: SIGNIFICANT CHANGE UP
-  MEROPENEM: SIGNIFICANT CHANGE UP
-  NITROFURANTOIN: SIGNIFICANT CHANGE UP
-  PIPERACILLIN/TAZOBACTAM: SIGNIFICANT CHANGE UP
-  TOBRAMYCIN: SIGNIFICANT CHANGE UP
-  TRIMETHOPRIM/SULFAMETHOXAZOLE: SIGNIFICANT CHANGE UP
BLANDM BLD POS QL PROBE: SIGNIFICANT CHANGE UP
CULTURE RESULTS: ABNORMAL
CULTURE RESULTS: ABNORMAL
METHOD TYPE: SIGNIFICANT CHANGE UP
ORGANISM # SPEC MICROSCOPIC CNT: ABNORMAL
ORGANISM # SPEC MICROSCOPIC CNT: SIGNIFICANT CHANGE UP
ORGANISM # SPEC MICROSCOPIC CNT: SIGNIFICANT CHANGE UP
SPECIMEN SOURCE: SIGNIFICANT CHANGE UP
SPECIMEN SOURCE: SIGNIFICANT CHANGE UP

## 2024-04-25 RX ORDER — LEVOFLOXACIN 5 MG/ML
1 INJECTION, SOLUTION INTRAVENOUS
Qty: 7 | Refills: 0
Start: 2024-04-25 | End: 2024-05-01

## 2024-04-26 ENCOUNTER — APPOINTMENT (OUTPATIENT)
Dept: UROLOGY | Facility: CLINIC | Age: 59
End: 2024-04-26
Payer: MEDICAID

## 2024-04-26 VITALS
TEMPERATURE: 97.7 F | OXYGEN SATURATION: 98 % | SYSTOLIC BLOOD PRESSURE: 123 MMHG | DIASTOLIC BLOOD PRESSURE: 77 MMHG | HEART RATE: 82 BPM

## 2024-04-26 PROCEDURE — 99213 OFFICE O/P EST LOW 20 MIN: CPT

## 2024-04-26 NOTE — HISTORY OF PRESENT ILLNESS
[FreeTextEntry1] : 59y/o male, recent bilateral hydronephrosis mostly on the left side possibly related to colostomy, underwent bilateral metal stent placement on 12/12/2023. Stents were removed on february 2024 and replaced with percutaneous bilateral nephrostomies.  The patient denies pain DARIEN. Occasional burning of the terminal urethra. The patient states that he is still urinating few drops from the penis.   Nephrostomies working regularly. Suggesting to the patient to communicate with IR and plan for periodical replacement of the nephrostomy tubes. Comes today for recent UTI

## 2024-04-26 NOTE — ASSESSMENT
[FreeTextEntry1] : 59y/o male, recent bilateral hydronephrosis mostly on the left side possibly related to colostomy, underwent bilateral metal stent placement on 12/12/2023. Stents were removed on february 2024 and replaced with percutaneous bilateral nephrostomies.  The patient denies pain DARIEN. Occasional burning of the terminal urethra. The patient states that he is still urinating few drops from the penis.   Nephrostomies working regularly. Suggesting to the patient to communicate with IR and plan for periodical replacement of the nephrostomy tubes. Comes today for recent UTI  DARIEN on ABX. Ordering new UCX to be performed 1 week after the end of the current ABX treatment.

## 2024-05-02 ENCOUNTER — RX RENEWAL (OUTPATIENT)
Age: 59
End: 2024-05-02

## 2024-05-02 RX ORDER — ERGOCALCIFEROL 1.25 MG/1
1.25 MG CAPSULE, LIQUID FILLED ORAL
Qty: 4 | Refills: 5 | Status: ACTIVE | COMMUNITY
Start: 2023-12-12 | End: 1900-01-01

## 2024-05-17 ENCOUNTER — APPOINTMENT (OUTPATIENT)
Dept: UROLOGY | Facility: CLINIC | Age: 59
End: 2024-05-17
Payer: MEDICAID

## 2024-05-17 VITALS
SYSTOLIC BLOOD PRESSURE: 146 MMHG | OXYGEN SATURATION: 97 % | DIASTOLIC BLOOD PRESSURE: 99 MMHG | TEMPERATURE: 97.5 F | HEART RATE: 89 BPM

## 2024-05-17 PROCEDURE — 99213 OFFICE O/P EST LOW 20 MIN: CPT

## 2024-05-17 NOTE — HISTORY OF PRESENT ILLNESS
[FreeTextEntry1] : 59y/o male, recent bilateral hydronephrosis mostly on the left side possibly related to colostomy, underwent bilateral metal stent placement on 12/12/2023. Stents were removed on february 2024 and replaced with percutaneous bilateral nephrostomies.  The patient denies pain DARIEN. Occasional burning of the terminal urethra. The patient states that he is still urinating few drops from the penis.   Nephrostomies working regularly. Suggesting to the patient to communicate with IR and plan for periodical replacement of the nephrostomy tubes. Comes today for recent UTI  discharge planning/medical equipment

## 2024-05-17 NOTE — ASSESSMENT
[FreeTextEntry1] : 57y/o male, recent bilateral hydronephrosis mostly on the left side possibly related to colostomy, underwent bilateral metal stent placement on 12/12/2023. Stents were removed on february 2024 and replaced with percutaneous bilateral nephrostomies.  The patient denies pain DARIEN. Occasional burning of the terminal urethra. The patient states that he is still urinating few drops from the penis.   Nephrostomies working regularly. Suggesting to the patient to communicate with IR and plan for periodical replacement of the nephrostomy tubes. Comes today for recent UTI  Collecting urine for UA and Culture   The patient is asking for possible removal of the nephrostomy tubes. DETOUR positioning is explained as a possible treatment. The patient will evaluate this possibility.

## 2024-05-20 LAB — BACTERIA UR CULT: NORMAL

## 2024-05-21 NOTE — ASU PREOP CHECKLIST - PATIENT'S PERSONAL PROPERTY GIVEN TO
>>once daily  Continue Isolation for entire hospitalization secondary to multiple drug resistant organisms  Urology consulted, 5/1: [facility] --> see renal calculus issue   on unit

## 2024-05-28 ENCOUNTER — APPOINTMENT (OUTPATIENT)
Dept: UROLOGY | Facility: CLINIC | Age: 59
End: 2024-05-28
Payer: MEDICAID

## 2024-05-28 VITALS
TEMPERATURE: 97.7 F | BODY MASS INDEX: 29.82 KG/M2 | WEIGHT: 213 LBS | HEIGHT: 71 IN | HEART RATE: 84 BPM | DIASTOLIC BLOOD PRESSURE: 87 MMHG | RESPIRATION RATE: 16 BRPM | OXYGEN SATURATION: 98 % | SYSTOLIC BLOOD PRESSURE: 131 MMHG

## 2024-05-28 DIAGNOSIS — N39.0 URINARY TRACT INFECTION, SITE NOT SPECIFIED: ICD-10-CM

## 2024-05-28 PROCEDURE — 99213 OFFICE O/P EST LOW 20 MIN: CPT

## 2024-05-28 NOTE — ASSESSMENT
[FreeTextEntry1] : 59y/o male, recent bilateral hydronephrosis mostly on the left side possibly related to colostomy, underwent bilateral metal stent placement on 12/12/2023. Stents were removed on february 2024 and replaced with percutaneous bilateral nephrostomies.  The patient denies pain DARIEN. Occasional burning of the terminal urethra. The patient states that he is still urinating few drops from the penis.   Nephrostomies working regularly. Suggesting to the patient to communicate with IR and plan for periodical replacement of the nephrostomy tubes. Comes today to repeat urine culture due to contaminated specimen.  The patient is asking for possible removal of the nephrostomy tubes. DETOUR positioning is explained as a possible treatment. The patient will evaluate this possibility.  Collecting urine for UA and Culture. Will F/U in case of positive results

## 2024-05-28 NOTE — HISTORY OF PRESENT ILLNESS
[FreeTextEntry1] : 59y/o male, recent bilateral hydronephrosis mostly on the left side possibly related to colostomy, underwent bilateral metal stent placement on 12/12/2023. Stents were removed on february 2024 and replaced with percutaneous bilateral nephrostomies.  The patient denies pain DARIEN. Occasional burning of the terminal urethra. The patient states that he is still urinating few drops from the penis.   Nephrostomies working regularly. Suggesting to the patient to communicate with IR and plan for periodical replacement of the nephrostomy tubes. Comes today to repeat urine culture due to contaminated specimen.

## 2024-05-31 ENCOUNTER — APPOINTMENT (OUTPATIENT)
Dept: PODIATRY | Facility: CLINIC | Age: 59
End: 2024-05-31

## 2024-05-31 VITALS
DIASTOLIC BLOOD PRESSURE: 94 MMHG | OXYGEN SATURATION: 99 % | HEIGHT: 71 IN | WEIGHT: 213 LBS | BODY MASS INDEX: 29.82 KG/M2 | SYSTOLIC BLOOD PRESSURE: 153 MMHG | HEART RATE: 88 BPM | TEMPERATURE: 97.5 F

## 2024-05-31 PROCEDURE — 99213 OFFICE O/P EST LOW 20 MIN: CPT

## 2024-05-31 RX ORDER — CICLOPIROX 80 MG/ML
8 SOLUTION TOPICAL
Qty: 1 | Refills: 2 | Status: ACTIVE | COMMUNITY
Start: 2024-05-31 | End: 1900-01-01

## 2024-05-31 NOTE — REASON FOR VISIT
[Follow-Up Visit] : a follow-up visit for [Onychomycosis] : onychomycosis [Spouse] : spouse [FreeTextEntry2] : Patient is being seen today for B/L nail fungus.

## 2024-06-01 ENCOUNTER — NON-APPOINTMENT (OUTPATIENT)
Age: 59
End: 2024-06-01

## 2024-06-02 ENCOUNTER — NON-APPOINTMENT (OUTPATIENT)
Age: 59
End: 2024-06-02

## 2024-06-02 LAB — BACTERIA UR CULT: ABNORMAL

## 2024-06-03 ENCOUNTER — APPOINTMENT (OUTPATIENT)
Dept: INFECTIOUS DISEASE | Facility: CLINIC | Age: 59
End: 2024-06-03

## 2024-06-05 ENCOUNTER — APPOINTMENT (OUTPATIENT)
Dept: HEART AND VASCULAR | Facility: CLINIC | Age: 59
End: 2024-06-05

## 2024-06-11 RX ORDER — SULFAMETHOXAZOLE AND TRIMETHOPRIM 800; 160 MG/1; MG/1
800-160 TABLET ORAL TWICE DAILY
Qty: 20 | Refills: 0 | Status: ACTIVE | COMMUNITY
Start: 2024-06-11 | End: 1900-01-01

## 2024-06-28 ENCOUNTER — APPOINTMENT (OUTPATIENT)
Dept: INTERNAL MEDICINE | Facility: CLINIC | Age: 59
End: 2024-06-28
Payer: MEDICAID

## 2024-06-28 VITALS
SYSTOLIC BLOOD PRESSURE: 129 MMHG | HEART RATE: 88 BPM | BODY MASS INDEX: 29.26 KG/M2 | TEMPERATURE: 97.6 F | OXYGEN SATURATION: 97 % | DIASTOLIC BLOOD PRESSURE: 87 MMHG | WEIGHT: 209 LBS | HEIGHT: 71 IN

## 2024-06-28 VITALS
OXYGEN SATURATION: 99 % | BODY MASS INDEX: 29.15 KG/M2 | SYSTOLIC BLOOD PRESSURE: 119 MMHG | TEMPERATURE: 97.7 F | HEART RATE: 99 BPM | DIASTOLIC BLOOD PRESSURE: 80 MMHG | HEIGHT: 71 IN

## 2024-06-28 DIAGNOSIS — I10 ESSENTIAL (PRIMARY) HYPERTENSION: ICD-10-CM

## 2024-06-28 DIAGNOSIS — Z96.0 PRESENCE OF UROGENITAL IMPLANTS: ICD-10-CM

## 2024-06-28 DIAGNOSIS — C61 MALIGNANT NEOPLASM OF PROSTATE: ICD-10-CM

## 2024-06-28 DIAGNOSIS — N20.0 CALCULUS OF KIDNEY: ICD-10-CM

## 2024-06-28 PROCEDURE — 99214 OFFICE O/P EST MOD 30 MIN: CPT

## 2024-06-28 PROCEDURE — G2211 COMPLEX E/M VISIT ADD ON: CPT | Mod: NC

## 2024-07-02 ENCOUNTER — LABORATORY RESULT (OUTPATIENT)
Age: 59
End: 2024-07-02

## 2024-07-02 ENCOUNTER — APPOINTMENT (OUTPATIENT)
Dept: UROLOGY | Facility: CLINIC | Age: 59
End: 2024-07-02
Payer: MEDICAID

## 2024-07-02 VITALS
HEIGHT: 71 IN | BODY MASS INDEX: 29.26 KG/M2 | DIASTOLIC BLOOD PRESSURE: 90 MMHG | HEART RATE: 89 BPM | OXYGEN SATURATION: 99 % | SYSTOLIC BLOOD PRESSURE: 143 MMHG | WEIGHT: 209 LBS | TEMPERATURE: 97.5 F | RESPIRATION RATE: 16 BRPM

## 2024-07-02 DIAGNOSIS — Z93.6 OTHER ARTIFICIAL OPENINGS OF URINARY TRACT STATUS: ICD-10-CM

## 2024-07-02 PROCEDURE — 99213 OFFICE O/P EST LOW 20 MIN: CPT

## 2024-07-05 NOTE — PATIENT PROFILE ADULT - PUBLIC BENEFITS
Assessment & Plan     Rib pain on right side  Cervical radicular pain  Very likely MSK in natures. Discussed options. PHYSICAL THERAPY may be helpful. She has primary care appt to establish early next week. Use of OTC  meds. discussed   - hydrOXYzine HCl (ATARAX) 25 MG tablet  Dispense: 20 tablet; Refill: 0  - cyclobenzaprine (FLEXERIL) 5 MG tablet  Dispense: 30 tablet; Refill: 0  - methylPREDNISolone (MEDROL DOSEPAK) 4 MG tablet therapy pack  Dispense: 21 tablet; Refill: 0         [id number]}    No follow-ups on file.    Juan Siu MD  [facility]    ------------------------------------------------------------------------  Subjective     Marine Andrews presents to clinic today for the following health issues:  chief complaint  HPI  She has had right sided radicular pain and more recently right sided ribs cage pain evaluated in the ER. There she was given medrol and hydroxysine which was helpful but the symptoms have been ongoing for about 6 weeks and worse again lately. No new injury. Worse when twisting, turning and breathing/lifting.       Review of Systems        Objective    BP (!) 129/91   Pulse 66   Temp 98.2  F (36.8  C)   Resp 18   Wt 77.6 kg (171 lb)   SpO2 100%   Breastfeeding No   BMI 27.60 kg/m    Physical Exam   GENERAL: alert and no distress  NECK: no adenopathy, no asymmetry, masses, or scars  NECK: Neck exam: tenderness over trapezial muscles, reduced painful C-spine range of motion, normal neurological exam of arms; normal DTR's, motor, sensory exam.   RESP: lungs clear to auscultation - no rales, rhonchi or wheezes  SKIN: no suspicious lesions or rashes  NEURO: Normal strength and tone, mentation intact and speech normal  Msk: right sided rib cage painful at the costochondral junsction. No stepoffs nor crepitus.   
no

## 2024-07-07 PROBLEM — N39.0 UTI (URINARY TRACT INFECTION): Status: ACTIVE | Noted: 2024-07-07 | Resolved: 2024-08-06

## 2024-07-15 ENCOUNTER — APPOINTMENT (OUTPATIENT)
Dept: CARDIOLOGY | Facility: CLINIC | Age: 59
End: 2024-07-15
Payer: MEDICAID

## 2024-07-15 ENCOUNTER — NON-APPOINTMENT (OUTPATIENT)
Age: 59
End: 2024-07-15

## 2024-07-15 VITALS
HEART RATE: 93 BPM | DIASTOLIC BLOOD PRESSURE: 86 MMHG | HEIGHT: 71 IN | BODY MASS INDEX: 29.26 KG/M2 | OXYGEN SATURATION: 96 % | WEIGHT: 209 LBS | SYSTOLIC BLOOD PRESSURE: 120 MMHG

## 2024-07-15 DIAGNOSIS — R94.31 ABNORMAL ELECTROCARDIOGRAM [ECG] [EKG]: ICD-10-CM

## 2024-07-15 DIAGNOSIS — R60.0 LOCALIZED EDEMA: ICD-10-CM

## 2024-07-15 DIAGNOSIS — E78.49 OTHER HYPERLIPIDEMIA: ICD-10-CM

## 2024-07-15 DIAGNOSIS — I10 ESSENTIAL (PRIMARY) HYPERTENSION: ICD-10-CM

## 2024-07-15 PROCEDURE — G2211 COMPLEX E/M VISIT ADD ON: CPT | Mod: NC

## 2024-07-15 PROCEDURE — 93000 ELECTROCARDIOGRAM COMPLETE: CPT

## 2024-07-15 PROCEDURE — 99214 OFFICE O/P EST MOD 30 MIN: CPT | Mod: 25

## 2024-07-16 ENCOUNTER — APPOINTMENT (OUTPATIENT)
Dept: COLORECTAL SURGERY | Facility: CLINIC | Age: 59
End: 2024-07-16
Payer: MEDICAID

## 2024-07-16 DIAGNOSIS — K59.09 OTHER CONSTIPATION: ICD-10-CM

## 2024-07-16 PROCEDURE — 99213 OFFICE O/P EST LOW 20 MIN: CPT

## 2024-07-17 ENCOUNTER — APPOINTMENT (OUTPATIENT)
Dept: DERMATOLOGY | Facility: CLINIC | Age: 59
End: 2024-07-17
Payer: MEDICAID

## 2024-07-17 DIAGNOSIS — L65.9 NONSCARRING HAIR LOSS, UNSPECIFIED: ICD-10-CM

## 2024-07-17 PROCEDURE — 99214 OFFICE O/P EST MOD 30 MIN: CPT

## 2024-07-18 ENCOUNTER — LABORATORY RESULT (OUTPATIENT)
Age: 59
End: 2024-07-18

## 2024-07-18 ENCOUNTER — OUTPATIENT (OUTPATIENT)
Dept: OUTPATIENT SERVICES | Facility: HOSPITAL | Age: 59
LOS: 1 days | End: 2024-07-18
Payer: MEDICAID

## 2024-07-18 ENCOUNTER — APPOINTMENT (OUTPATIENT)
Dept: INFECTIOUS DISEASE | Facility: CLINIC | Age: 59
End: 2024-07-18
Payer: MEDICAID

## 2024-07-18 VITALS
DIASTOLIC BLOOD PRESSURE: 90 MMHG | WEIGHT: 205 LBS | HEART RATE: 102 BPM | TEMPERATURE: 97.7 F | HEIGHT: 71 IN | BODY MASS INDEX: 28.7 KG/M2 | SYSTOLIC BLOOD PRESSURE: 130 MMHG | OXYGEN SATURATION: 95 %

## 2024-07-18 DIAGNOSIS — B20 HUMAN IMMUNODEFICIENCY VIRUS [HIV] DISEASE: ICD-10-CM

## 2024-07-18 DIAGNOSIS — Z92.89 PERSONAL HISTORY OF OTHER MEDICAL TREATMENT: ICD-10-CM

## 2024-07-18 DIAGNOSIS — Z96.0 PRESENCE OF UROGENITAL IMPLANTS: Chronic | ICD-10-CM

## 2024-07-18 DIAGNOSIS — R21 RASH AND OTHER NONSPECIFIC SKIN ERUPTION: ICD-10-CM

## 2024-07-18 DIAGNOSIS — A53.9 SYPHILIS, UNSPECIFIED: ICD-10-CM

## 2024-07-18 DIAGNOSIS — C21.0 MALIGNANT NEOPLASM OF ANUS, UNSPECIFIED: ICD-10-CM

## 2024-07-18 LAB
HCT VFR BLD CALC: 42.5 % — SIGNIFICANT CHANGE UP (ref 39–50)
HGB BLD-MCNC: 14 G/DL — SIGNIFICANT CHANGE UP (ref 13–17)
MCHC RBC-ENTMCNC: 29.3 PG — SIGNIFICANT CHANGE UP (ref 27–34)
MCHC RBC-ENTMCNC: 32.9 GM/DL — SIGNIFICANT CHANGE UP (ref 32–36)
MCV RBC AUTO: 88.9 FL — SIGNIFICANT CHANGE UP (ref 80–100)
PLATELET # BLD AUTO: 295 K/UL — SIGNIFICANT CHANGE UP (ref 150–400)
RBC # BLD: 4.78 M/UL — SIGNIFICANT CHANGE UP (ref 4.2–5.8)
RBC # FLD: 14.2 % — SIGNIFICANT CHANGE UP (ref 10.3–14.5)
WBC # BLD: 7.05 K/UL — SIGNIFICANT CHANGE UP (ref 3.8–10.5)
WBC # FLD AUTO: 7.05 K/UL — SIGNIFICANT CHANGE UP (ref 3.8–10.5)

## 2024-07-18 PROCEDURE — 99215 OFFICE O/P EST HI 40 MIN: CPT | Mod: 25

## 2024-07-18 PROCEDURE — 86359 T CELLS TOTAL COUNT: CPT

## 2024-07-18 PROCEDURE — 96372 THER/PROPH/DIAG INJ SC/IM: CPT

## 2024-07-18 PROCEDURE — 85027 COMPLETE CBC AUTOMATED: CPT

## 2024-07-18 PROCEDURE — G0463: CPT | Mod: 25

## 2024-07-18 PROCEDURE — 86780 TREPONEMA PALLIDUM: CPT

## 2024-07-18 PROCEDURE — 86592 SYPHILIS TEST NON-TREP QUAL: CPT

## 2024-07-18 PROCEDURE — 87536 HIV-1 QUANT&REVRSE TRNSCRPJ: CPT

## 2024-07-18 PROCEDURE — 86360 T CELL ABSOLUTE COUNT/RATIO: CPT

## 2024-07-18 PROCEDURE — 86593 SYPHILIS TEST NON-TREP QUANT: CPT

## 2024-07-18 RX ORDER — PENICILLIN G BENZATHINE 2400000 [IU]/4ML
2400000 INJECTION, SUSPENSION INTRAMUSCULAR
Qty: 0 | Refills: 0 | Status: COMPLETED | OUTPATIENT
Start: 2024-07-18

## 2024-07-18 RX ADMIN — PENICILLIN G BENZATHINE 0 UNIT/4ML: 2400000 INJECTION, SUSPENSION INTRAMUSCULAR at 00:00

## 2024-07-19 ENCOUNTER — OUTPATIENT (OUTPATIENT)
Dept: OUTPATIENT SERVICES | Facility: HOSPITAL | Age: 59
LOS: 1 days | Discharge: HOME | End: 2024-07-19
Payer: MEDICAID

## 2024-07-19 ENCOUNTER — TRANSCRIPTION ENCOUNTER (OUTPATIENT)
Age: 59
End: 2024-07-19

## 2024-07-19 ENCOUNTER — APPOINTMENT (OUTPATIENT)
Dept: INTERVENTIONAL RADIOLOGY/VASCULAR | Facility: HOSPITAL | Age: 59
End: 2024-07-19

## 2024-07-19 DIAGNOSIS — Z98.890 OTHER SPECIFIED POSTPROCEDURAL STATES: Chronic | ICD-10-CM

## 2024-07-19 DIAGNOSIS — Z96.0 PRESENCE OF UROGENITAL IMPLANTS: Chronic | ICD-10-CM

## 2024-07-19 DIAGNOSIS — Z93.3 COLOSTOMY STATUS: Chronic | ICD-10-CM

## 2024-07-19 DIAGNOSIS — Z43.6 ENCOUNTER FOR ATTENTION TO OTHER ARTIFICIAL OPENINGS OF URINARY TRACT: ICD-10-CM

## 2024-07-19 DIAGNOSIS — K62.9 DISEASE OF ANUS AND RECTUM, UNSPECIFIED: Chronic | ICD-10-CM

## 2024-07-19 LAB
4/8 RATIO: 0.37 RATIO — LOW (ref 0.9–3.6)
ABS CD8: 2220 CELLS/UL — HIGH (ref 142–740)
CD3 BLASTS SPEC-ACNC: 3039 CELLS/UL — HIGH (ref 672–1870)
CD3 BLASTS SPEC-ACNC: 87 % — HIGH (ref 59–83)
CD4 %: 23 % — LOW (ref 30–62)
CD8 %: 63 % — HIGH (ref 12–36)
HIV-1 VIRAL LOAD RESULT: ABNORMAL
HIV1 RNA # SERPL NAA+PROBE: 32 — SIGNIFICANT CHANGE UP
HIV1 RNA SER-IMP: SIGNIFICANT CHANGE UP
HIV1 RNA SERPL NAA+PROBE-ACNC: ABNORMAL
HIV1 RNA SERPL NAA+PROBE-LOG#: 1.5 — SIGNIFICANT CHANGE UP
RPR SER-TITR: (no result)
RPR SERPL-ACNC: REACTIVE
T PALLIDUM AB TITR SER: POSITIVE
T-CELL CD4 SUBSET PNL BLD: 812 CELLS/UL — SIGNIFICANT CHANGE UP (ref 489–1457)

## 2024-07-19 PROCEDURE — 50435 EXCHANGE NEPHROSTOMY CATH: CPT | Mod: 50

## 2024-07-19 PROCEDURE — ZZZZZ: CPT

## 2024-07-19 RX ADMIN — PENICILLIN G BENZATHINE 0 UNIT/4ML: 2400000 INJECTION, SUSPENSION INTRAMUSCULAR at 00:00

## 2024-07-19 NOTE — ASU DISCHARGE PLAN (ADULT/PEDIATRIC) - ASU DC SPECIAL INSTRUCTIONSFT
Nephrostomy tube exchange    Discharge Instructions  - You have had your nephrostomy tube exchanged.  - Keep the area clean and dry.  - Do not soak in a tub or pool with the drain, however you may shower with the drain and dressing covered in plastic wrap.  - Do not put traction on the drain and be careful that the drain does not get accidentally dislodged or kinked.  - Record output daily from the drain. Empty the bag as needed.  - You may resume your normal diet.  - You may resume your normal medications.  - It is normal to experience some pain over the site for the next few days. You may take apply ice to the area (20 minutes on, 20 minutes off) and take Tylenol for that pain. Do not take more frequently than every 6 hours and do not exceed more than 3000mg of Tylenol in a 24 hour period.    Notify your primary physician and/or Interventional Radiology IMMEDIATELY if you experience any of the following       - Fever of 100.4F  or 38C       - Chills or Rigors/ Shakes       - Swelling and/or Redness in the area of the puncture site       - Worsening Pain       - Blood soaked bandages or worsening bleeding       - Lightheadedness and/or dizziness upon standing       - Chest Pain/ Tightness       - Shortness of Breath       - Difficulty walking    If you have a problem that you believe requires IMMEDIATE attention, please go to your NEAREST Emergency Room. If you believe your problem can safely wait until you speak to a physician, please call Interventional Radiology for any concerns.    During Normal Weekday Business Hours- You can contact the Interventional Radiology department during normal business hours via telephone.  During Evenings and Weekends- If you need to contact Interventional Radiology during off hours, do so by calling the hospital and requesting to be connected to the Interventional Radiologist on call.

## 2024-07-19 NOTE — PROCEDURE NOTE - PROCEDURE FINDINGS AND DETAILS
Contrast injections demonstrated existing nephrostomy tubes in position. Successful exchange of 8.5F bilateral nephrostomy tubes over wire, using fluoroscopy guidance. No complications. Sutured in place and attached to gravity drainage bags. Full report to follow.

## 2024-07-19 NOTE — PRE PROCEDURE NOTE - PRE PROCEDURE EVALUATION
Interventional Radiology    HPI: 59y Male with 58y Male with h/o HTN, HLD, HIV on HAART ( CD 4 608, VL undetectable on 2/12/24), h/o jared Ca (s/p chemo, radio therapy, surgery, colostomy 03/2022), prostate Ca s/p radiotherapy, ureteral stricture s/p stent ( most recent exchange with Dr Mathew on 12/13/2023), recent admission with UTI ( urine culture with pseudomonas) s/p bilateral nehprostomy tube placement on 2/24, last exchanged 4/19/2024, presents for routine exchange.     Allergies: No Known Allergies    Medications (Abx/Cardiac/Anticoagulation/Blood Products)      Data:    T(C): --  HR: --  BP: --  RR: --  SpO2: --    Exam  General: No acute distress  Chest: Non labored breathing  Abdomen: Non-distended  Extremities: No swelling, warm    -WBC 7.05 / HgB 14.0 / Hct 42.5 / Plt 295      Imaging: Reviewed    Plan: 59y Male presents for bilateral nephrostomy tube exchange  -Risks/Benefits/alternatives explained with the patient and/or healthcare proxy and witnessed informed consent obtained.

## 2024-07-25 ENCOUNTER — LABORATORY RESULT (OUTPATIENT)
Age: 59
End: 2024-07-25

## 2024-07-25 ENCOUNTER — APPOINTMENT (OUTPATIENT)
Dept: INFECTIOUS DISEASE | Facility: CLINIC | Age: 59
End: 2024-07-25

## 2024-07-25 ENCOUNTER — MED ADMIN CHARGE (OUTPATIENT)
Age: 59
End: 2024-07-25

## 2024-07-25 PROCEDURE — ZZZZZ: CPT

## 2024-07-25 RX ORDER — PENICILLIN G BENZATHINE 2400000 [IU]/4ML
2400000 INJECTION, SUSPENSION INTRAMUSCULAR
Qty: 1 | Refills: 0 | Status: COMPLETED | OUTPATIENT
Start: 2024-07-19

## 2024-07-25 NOTE — PROGRESS NOTE ADULT - SUBJECTIVE AND OBJECTIVE BOX
Reason for admission     Hello, I see that Michael (Michael) is here with us for: hyponatremia      Plan of Care Discussed    Medications: Yes  Activity:         Yes  Diet:              No  Pain control: Yes  Testing/Procedures: Yes  Results: Yes    Discharge Expectations    Estimated Discharge Date: 7/26/2024  Expected Home Needs: TBD.    Update given to: brother Abby    Plastic Surgery Daily Progress Note  =====================================================    SUBJECTIVE: Patient seen and examined at bedside on AM rounds. Patient reports that they're feeling well. Pt febrile to 100.5 overnight and tachycardic to 120s.    PMH:   APR w/ flap closure, VRAM flap    ALLERGIES:  Allergy Status Unknown  No Known Allergies  --------------------------------------------------------------------------------------    MEDICATIONS:    Neurologic Medications  acetaminophen     Tablet .. 650 milliGRAM(s) Oral every 6 hours PRN Temp greater or equal to 38C (100.4F)  gabapentin 600 milliGRAM(s) Oral <User Schedule>  gabapentin 300 milliGRAM(s) Oral <User Schedule>  HYDROmorphone   Tablet 8 milliGRAM(s) Oral every 6 hours PRN Moderate- Severe Pain (4 - 10)  HYDROmorphone  Injectable 1 milliGRAM(s) IV Push every 6 hours PRN Severe Breakthrough Pain (7 - 10)  morphine ER Tablet 100 milliGRAM(s) Oral every 12 hours  ondansetron Injectable 4 milliGRAM(s) IV Push every 6 hours PRN Nausea  zolpidem 5 milliGRAM(s) Oral at bedtime PRN Insomnia  zolpidem 5 milliGRAM(s) Oral at bedtime PRN Insomnia    Respiratory Medications    Cardiovascular Medications  amLODIPine   Tablet 5 milliGRAM(s) Oral daily  metoprolol succinate ER 50 milliGRAM(s) Oral daily    Gastrointestinal Medications    Genitourinary Medications    Hematologic/Oncologic Medications  heparin   Injectable 5000 Unit(s) SubCutaneous every 12 hours    Antimicrobial/Immunologic Medications  bictegravir 50 mG/emtricitabine 200 mG/tenofovir alafenamide 25 mG (BIKTARVY) 1 Tablet(s) Oral daily  piperacillin/tazobactam IVPB.. 3.375 Gram(s) IV Intermittent every 8 hours    Endocrine/Metabolic Medications    Topical/Other Medications  chlorhexidine 2% Cloths 1 Application(s) Topical daily  lidocaine   4% Patch 1 Patch Transdermal every 24 hours  naloxone Injectable 0.1 milliGRAM(s) IV Push every 3 minutes PRN For ANY of the following changes in patient status:  A. RR LESS THAN 10 breaths per minute, B. Oxygen saturation LESS THAN 90%, C. Sedation score of 6    --------------------------------------------------------------------------------------    VITAL SIGNS:  ICU Vital Signs Last 24 Hrs  T(C): 37 (29 Aug 2022 06:16), Max: 38.1 (29 Aug 2022 01:50)  T(F): 98.6 (29 Aug 2022 06:16), Max: 100.5 (29 Aug 2022 01:50)  HR: 117 (29 Aug 2022 06:16) (114 - 125)  BP: 129/81 (29 Aug 2022 06:16) (114/90 - 140/91)  BP(mean): --  ABP: --  ABP(mean): --  RR: 19 (29 Aug 2022 06:16) (18 - 19)  SpO2: 99% (29 Aug 2022 06:16) (98% - 99%)    O2 Parameters below as of 29 Aug 2022 06:16  Patient On (Oxygen Delivery Method): room air    --------------------------------------------------------------------------------------    EXAM    -- CONSTITUTIONAL: NAD, lying in bed  -- NEURO: Awake, alert  -- PULM: Non-labored respirations  -- ABDOMEN: incision is c/d/i, no erythema, JPx2 to suction s/s output,   -- PERINEUM: flap warm, incisions intact, stable good color and cap refill, healthy appearing, area of darker skin on posterior surface from prior tattoo, dressing c/d/i  --: susan in      --------------------------------------------------------------------------------------    LABS                        9.4    16.59 )-----------( 421      ( 28 Aug 2022 08:27 )             29.8   08-29    136  |  100  |  8   ----------------------------<  108<H>  4.0   |  27  |  0.52    Ca    8.5      29 Aug 2022 05:30  Phos  3.1     08-29  Mg     2.00     08-29    TPro  6.1  /  Alb  2.6<L>  /  TBili  0.2  /  DBili  x   /  AST  17  /  ALT  12  /  AlkPhos  80  08-29      --------------------------------------------------------------------------------------    INS AND OUTS:  I&O's Detail    28 Aug 2022 07:01  -  29 Aug 2022 07:00  --------------------------------------------------------  IN:  Total IN: 0 mL    OUT:    Bulb (mL): 25 mL    Bulb (mL): 25 mL    Colostomy (mL): 150 mL    Indwelling Catheter - Urethral (mL): 4100 mL  Total OUT: 4300 mL    Total NET: -4300 mL        --------------------------------------------------------------------------------------

## 2024-07-26 ENCOUNTER — APPOINTMENT (OUTPATIENT)
Dept: UROLOGY | Facility: CLINIC | Age: 59
End: 2024-07-26
Payer: MEDICAID

## 2024-07-26 VITALS
DIASTOLIC BLOOD PRESSURE: 78 MMHG | WEIGHT: 205 LBS | RESPIRATION RATE: 16 BRPM | OXYGEN SATURATION: 98 % | HEART RATE: 71 BPM | SYSTOLIC BLOOD PRESSURE: 114 MMHG | TEMPERATURE: 98 F | HEIGHT: 71 IN | BODY MASS INDEX: 28.7 KG/M2

## 2024-07-26 DIAGNOSIS — Z93.6 OTHER ARTIFICIAL OPENINGS OF URINARY TRACT STATUS: ICD-10-CM

## 2024-07-26 DIAGNOSIS — N39.0 URINARY TRACT INFECTION, SITE NOT SPECIFIED: ICD-10-CM

## 2024-07-26 PROCEDURE — 99213 OFFICE O/P EST LOW 20 MIN: CPT

## 2024-07-26 NOTE — ASSESSMENT
[FreeTextEntry1] : 57y/o male, recent bilateral hydronephrosis mostly on the left side possibly related to colostomy, underwent bilateral metal stent placement on 12/12/2023. Stents were removed on february 2024 and replaced with percutaneous bilateral nephrostomies.  The patient denies pain DARIEN. Occasional burning of the terminal urethra. The patient states that he is still urinating few drops from the penis.   Comes today to repeat urine culture from nephrostomy.  Nephrostomies working regularly. The patient is asking for possible removal of the nephrostomy tubes. DETOUR positioning is explained as a possible treatment. The patient will evaluate this possibility.  Collecting urine for UA and Culture. Will F/U in case of positive results

## 2024-07-26 NOTE — PROCEDURE NOTE - IR COMPLICATIONS
No complications [As Noted in HPI] : as noted in HPI [Negative] : Heme/Lymph [Tension Headache] : no tension-type headache

## 2024-07-26 NOTE — HISTORY OF PRESENT ILLNESS
[FreeTextEntry1] : 57y/o male, recent bilateral hydronephrosis mostly on the left side possibly related to colostomy, underwent bilateral metal stent placement on 12/12/2023. Stents were removed on february 2024 and replaced with percutaneous bilateral nephrostomies.  The patient denies pain DARIEN. Occasional burning of the terminal urethra. The patient states that he is still urinating few drops from the penis.   Comes today to repeat urine culture from nephrostomy.

## 2024-07-27 LAB
APPEARANCE: CLEAR
BILIRUBIN URINE: NEGATIVE
BLOOD URINE: ABNORMAL
COLOR: YELLOW
GLUCOSE QUALITATIVE U: NEGATIVE MG/DL
KETONES URINE: NEGATIVE MG/DL
LEUKOCYTE ESTERASE URINE: ABNORMAL
NITRITE URINE: POSITIVE
PH URINE: 7
PROTEIN URINE: NEGATIVE MG/DL
SPECIFIC GRAVITY URINE: <1.005
UROBILINOGEN URINE: 0.2 MG/DL

## 2024-07-29 ENCOUNTER — RESULT REVIEW (OUTPATIENT)
Age: 59
End: 2024-07-29

## 2024-07-31 ENCOUNTER — APPOINTMENT (OUTPATIENT)
Dept: CT IMAGING | Facility: IMAGING CENTER | Age: 59
End: 2024-07-31
Payer: MEDICAID

## 2024-07-31 ENCOUNTER — OUTPATIENT (OUTPATIENT)
Dept: OUTPATIENT SERVICES | Facility: HOSPITAL | Age: 59
LOS: 1 days | End: 2024-07-31
Payer: MEDICAID

## 2024-07-31 DIAGNOSIS — K62.9 DISEASE OF ANUS AND RECTUM, UNSPECIFIED: Chronic | ICD-10-CM

## 2024-07-31 DIAGNOSIS — Z98.890 OTHER SPECIFIED POSTPROCEDURAL STATES: Chronic | ICD-10-CM

## 2024-07-31 DIAGNOSIS — C21.0 MALIGNANT NEOPLASM OF ANUS, UNSPECIFIED: ICD-10-CM

## 2024-07-31 DIAGNOSIS — Z96.0 PRESENCE OF UROGENITAL IMPLANTS: Chronic | ICD-10-CM

## 2024-07-31 DIAGNOSIS — Z93.3 COLOSTOMY STATUS: Chronic | ICD-10-CM

## 2024-07-31 PROCEDURE — 74177 CT ABD & PELVIS W/CONTRAST: CPT

## 2024-07-31 PROCEDURE — 71260 CT THORAX DX C+: CPT | Mod: 26

## 2024-07-31 PROCEDURE — 71260 CT THORAX DX C+: CPT

## 2024-07-31 PROCEDURE — 74177 CT ABD & PELVIS W/CONTRAST: CPT | Mod: 26

## 2024-07-31 NOTE — DIETITIAN NUTRITION RISK NOTIFICATION - LOSS OF SUBCUTANEOUS FAT
-- DO NOT REPLY / DO NOT REPLY ALL --  -- This inbox is not monitored. If this was sent to the wrong provider or department, reroute message to P [facility] Reroute pool. --  -- Message is from Engagement Center Operations ([facility]) --      Message Type:  Refill Medication   Refill request for Pended medication named: lorazepam 2 mg tramadol 50 mg , trazodone 50 mg   Preferred pharmacy verified, and selected.   [id number] [place] [address] AT [facility] [address]    Is the patient OUT of Medication?  Yes and Medication Refills handled by [facility] Clinical        Message: none                     Orbital.../Buccal...

## 2024-08-01 ENCOUNTER — APPOINTMENT (OUTPATIENT)
Dept: INFECTIOUS DISEASE | Facility: CLINIC | Age: 59
End: 2024-08-01

## 2024-08-01 ENCOUNTER — OUTPATIENT (OUTPATIENT)
Dept: OUTPATIENT SERVICES | Facility: HOSPITAL | Age: 59
LOS: 1 days | Discharge: HOME | End: 2024-08-01

## 2024-08-01 DIAGNOSIS — C18.9 MALIGNANT NEOPLASM OF COLON, UNSPECIFIED: ICD-10-CM

## 2024-08-01 DIAGNOSIS — Z98.890 OTHER SPECIFIED POSTPROCEDURAL STATES: Chronic | ICD-10-CM

## 2024-08-01 DIAGNOSIS — K62.9 DISEASE OF ANUS AND RECTUM, UNSPECIFIED: Chronic | ICD-10-CM

## 2024-08-01 DIAGNOSIS — Z96.0 PRESENCE OF UROGENITAL IMPLANTS: Chronic | ICD-10-CM

## 2024-08-01 DIAGNOSIS — Z93.3 COLOSTOMY STATUS: Chronic | ICD-10-CM

## 2024-08-01 PROCEDURE — ZZZZZ: CPT

## 2024-08-05 ENCOUNTER — APPOINTMENT (OUTPATIENT)
Dept: HEMATOLOGY ONCOLOGY | Facility: CLINIC | Age: 59
End: 2024-08-05

## 2024-08-05 ENCOUNTER — NON-APPOINTMENT (OUTPATIENT)
Age: 59
End: 2024-08-05

## 2024-08-05 PROCEDURE — 99214 OFFICE O/P EST MOD 30 MIN: CPT

## 2024-08-05 NOTE — HISTORY OF PRESENT ILLNESS
[Disease: _____________________] : Disease: [unfilled] [T: ___] : T[unfilled] [N: ___] : N[unfilled] [M: ___] : M[unfilled] [AJCC Stage: ____] : AJCC Stage: [unfilled] [Treatment Protocol] : Treatment Protocol [de-identified] : In 2020 at age of 55 patient had his screening colonoscopy performed by Dr. Lawrence in June 2020 that as per patient was informed to be within normal limits.  Subsequently patient had felt a bump in the perirectal area and at the time was told by GI that this could be a hemorrhoid and was treated as such.\par  His symptoms did improve and was eventually referred to Dr. Avilez for further evaluation and in March 2021 patient was seen by Dr. Avilez and underwent an exam and biopsy in April that revealed evidence of anal squamous cell carcinoma.\par  Patient was subsequently referred for evaluation and treatment.  Patient did not have full excision of the tumor rather a excisional biopsy.\par  \par  \par  Of note patient has a history of prostate cancer treated with radiation in 2016 at the [facility] patient received 45 treatments at that time and did not go undergo any surgery and or chemotherapy.\par   [de-identified] : Invasive basaloid squamous carcinoma [FreeTextEntry1] : Mitomycin Day#1, modified schedule RT with Xeloda completed 8/16/21 (condensed RT course) [de-identified] : Patient presents for follow up while on surveillance to review recent scans.  Since his last office visit he underwent B/L nephrostomy tube placement for ureter strictures and hydronephrosis related to prior radiation treatment.  His previously reported rectal and pelvic pain has resolved and he is no longer on pain medication.  He reports new B/L foot and ankle swelling for the last 3 weeks.  He reports that his cardiologist previously gave him Lasix for similar symptoms earlier this year.  He self restarted the Lasix every other day with some relief.  He denies pain, redness or skin breaks on the B/L LE.  He also reports progressive issues with memory that has been ongoing since prior to his treatment but has worsened in the last year.  He denies anorexia, weight loss, abdominal pain, change in bowel habits or blood in the stool.

## 2024-08-05 NOTE — HISTORY OF PRESENT ILLNESS
[Disease: _____________________] : Disease: [unfilled] [T: ___] : T[unfilled] [N: ___] : N[unfilled] [M: ___] : M[unfilled] [AJCC Stage: ____] : AJCC Stage: [unfilled] [Treatment Protocol] : Treatment Protocol [de-identified] : In 2020 at age of 55 patient had his screening colonoscopy performed by Dr. Lawrence in June 2020 that as per patient was informed to be within normal limits.  Subsequently patient had felt a bump in the perirectal area and at the time was told by GI that this could be a hemorrhoid and was treated as such.\par  His symptoms did improve and was eventually referred to Dr. Avilez for further evaluation and in March 2021 patient was seen by Dr. Avilez and underwent an exam and biopsy in April that revealed evidence of anal squamous cell carcinoma.\par  Patient was subsequently referred for evaluation and treatment.  Patient did not have full excision of the tumor rather a excisional biopsy.\par  \par  \par  Of note patient has a history of prostate cancer treated with radiation in 2016 at the [facility] patient received 45 treatments at that time and did not go undergo any surgery and or chemotherapy.\par   [de-identified] : Invasive basaloid squamous carcinoma [FreeTextEntry1] : Mitomycin Day#1, modified schedule RT with Xeloda completed 8/16/21 (condensed RT course) [de-identified] : Patient presents for follow up while on surveillance to review recent scans.  Since his last office visit he underwent B/L nephrostomy tube placement for ureter strictures and hydronephrosis related to prior radiation treatment.  His previously reported rectal and pelvic pain has resolved and he is no longer on pain medication.  He reports new B/L foot and ankle swelling for the last 3 weeks.  He reports that his cardiologist previously gave him Lasix for similar symptoms earlier this year.  He self restarted the Lasix every other day with some relief.  He denies pain, redness or skin breaks on the B/L LE.  He also reports progressive issues with memory that has been ongoing since prior to his treatment but has worsened in the last year.  He denies anorexia, weight loss, abdominal pain, change in bowel habits or blood in the stool.

## 2024-08-05 NOTE — PHYSICAL EXAM
[Restricted in physically strenuous activity but ambulatory and able to carry out work of a light or sedentary nature] : Status 1- Restricted in physically strenuous activity but ambulatory and able to carry out work of a light or sedentary nature, e.g., light house work, office work [Obese] : obese [Normal] : grossly intact [de-identified] : anicteric  [de-identified] : normal respiratory effort, no audible wheeze [de-identified] : reg rate  [de-identified] : mild non-pitting B/L LE edema [de-identified] : non-distended

## 2024-08-05 NOTE — PHYSICAL EXAM
[Restricted in physically strenuous activity but ambulatory and able to carry out work of a light or sedentary nature] : Status 1- Restricted in physically strenuous activity but ambulatory and able to carry out work of a light or sedentary nature, e.g., light house work, office work [Obese] : obese [Normal] : grossly intact [de-identified] : anicteric  [de-identified] : normal respiratory effort, no audible wheeze [de-identified] : reg rate  [de-identified] : mild non-pitting B/L LE edema [de-identified] : non-distended

## 2024-08-05 NOTE — REVIEW OF SYSTEMS
[Fatigue] : fatigue [Anxiety] : anxiety [Negative] : Allergic/Immunologic [FreeTextEntry7] : pelvic pain [FreeTextEntry8] : penile numbness  [Diarrhea: Grade 0] : Diarrhea: Grade 0 [Abdominal Pain] : no abdominal pain [Vomiting] : no vomiting [Constipation] : no constipation

## 2024-08-05 NOTE — PHYSICAL EXAM
[Restricted in physically strenuous activity but ambulatory and able to carry out work of a light or sedentary nature] : Status 1- Restricted in physically strenuous activity but ambulatory and able to carry out work of a light or sedentary nature, e.g., light house work, office work [Obese] : obese [Normal] : grossly intact [de-identified] : anicteric  [de-identified] : normal respiratory effort, no audible wheeze [de-identified] : reg rate  [de-identified] : mild non-pitting B/L LE edema [de-identified] : non-distended

## 2024-08-05 NOTE — ASU PREOP CHECKLIST - TYPE OF SOLUTION
LVM for patient to call me back. Wanted to remind patient to get labs for ID appointment for 8/9.   lr

## 2024-08-05 NOTE — HISTORY OF PRESENT ILLNESS
[Disease: _____________________] : Disease: [unfilled] [T: ___] : T[unfilled] [N: ___] : N[unfilled] [M: ___] : M[unfilled] [AJCC Stage: ____] : AJCC Stage: [unfilled] [Treatment Protocol] : Treatment Protocol [de-identified] : In 2020 at age of 55 patient had his screening colonoscopy performed by Dr. Lawrence in June 2020 that as per patient was informed to be within normal limits.  Subsequently patient had felt a bump in the perirectal area and at the time was told by GI that this could be a hemorrhoid and was treated as such.\par  His symptoms did improve and was eventually referred to Dr. Avilez for further evaluation and in March 2021 patient was seen by Dr. Avilez and underwent an exam and biopsy in April that revealed evidence of anal squamous cell carcinoma.\par  Patient was subsequently referred for evaluation and treatment.  Patient did not have full excision of the tumor rather a excisional biopsy.\par  \par  \par  Of note patient has a history of prostate cancer treated with radiation in 2016 at the [facility] patient received 45 treatments at that time and did not go undergo any surgery and or chemotherapy.\par   [de-identified] : Invasive basaloid squamous carcinoma [FreeTextEntry1] : Mitomycin Day#1, modified schedule RT with Xeloda completed 8/16/21 (condensed RT course) [de-identified] : Patient presents for follow up while on surveillance to review recent scans.  Since his last office visit he underwent B/L nephrostomy tube placement for ureter strictures and hydronephrosis related to prior radiation treatment.  His previously reported rectal and pelvic pain has resolved and he is no longer on pain medication.  He reports new B/L foot and ankle swelling for the last 3 weeks.  He reports that his cardiologist previously gave him Lasix for similar symptoms earlier this year.  He self restarted the Lasix every other day with some relief.  He denies pain, redness or skin breaks on the B/L LE.  He also reports progressive issues with memory that has been ongoing since prior to his treatment but has worsened in the last year.  He denies anorexia, weight loss, abdominal pain, change in bowel habits or blood in the stool.

## 2024-08-05 NOTE — BEGINNING OF VISIT
[0] : 2) Feeling down, depressed, or hopeless: Not at all (0) [PHQ-2 Negative] : PHQ-2 Negative [PHQ-9 Deferred] : PHQ-9 Deferred [[id number]] : 0 [Pain Scale: ___] : On a scale of 1-10, today the patient's pain is a(n) [unfilled]. [Former] : Former [< 15 Years] : < 15 Years [Patient/Caregiver not ready to engage] : Patient/Caregiver not ready to engage [Abdominal Pain] : no abdominal pain [Vomiting] : no vomiting [Constipation] : no constipation [Diarrhea Character] : Diarrhea: Grade 0

## 2024-08-16 NOTE — PROGRESS NOTE ADULT - PROBLEM/PLAN-7
as per HIE, h/o migraine. pt not reporting any migraine symptoms currently  - on home topiramate 25mg daily    - Held home med given no symptoms recently
DISPLAY PLAN FREE TEXT

## 2024-08-20 ENCOUNTER — APPOINTMENT (OUTPATIENT)
Dept: INTERNAL MEDICINE | Facility: CLINIC | Age: 59
End: 2024-08-20
Payer: MEDICAID

## 2024-08-20 VITALS
DIASTOLIC BLOOD PRESSURE: 90 MMHG | SYSTOLIC BLOOD PRESSURE: 129 MMHG | WEIGHT: 200 LBS | TEMPERATURE: 98.1 F | OXYGEN SATURATION: 99 % | HEIGHT: 71 IN | BODY MASS INDEX: 28 KG/M2 | HEART RATE: 80 BPM

## 2024-08-20 DIAGNOSIS — Z93.6 OTHER ARTIFICIAL OPENINGS OF URINARY TRACT STATUS: ICD-10-CM

## 2024-08-20 DIAGNOSIS — I10 ESSENTIAL (PRIMARY) HYPERTENSION: ICD-10-CM

## 2024-08-20 DIAGNOSIS — C21.0 MALIGNANT NEOPLASM OF ANUS, UNSPECIFIED: ICD-10-CM

## 2024-08-20 DIAGNOSIS — E78.49 OTHER HYPERLIPIDEMIA: ICD-10-CM

## 2024-08-20 DIAGNOSIS — Z43.6 ENCOUNTER FOR ATTENTION TO OTHER ARTIFICIAL OPENINGS OF URINARY TRACT: ICD-10-CM

## 2024-08-20 PROCEDURE — G2211 COMPLEX E/M VISIT ADD ON: CPT | Mod: NC

## 2024-08-20 PROCEDURE — 99214 OFFICE O/P EST MOD 30 MIN: CPT

## 2024-08-20 NOTE — HEALTH RISK ASSESSMENT
[No] : No [Yes] : In the past 12 months have you used drugs other than those required for medical reasons? Yes [No falls in past year] : Patient reported no falls in the past year [0] : 2) Feeling down, depressed, or hopeless: Not at all (0) [de-identified] : No [de-identified] : Oncologist Dr. Guan [de-identified] : Medical Marijuana  [de-identified] : Healthier  [de-identified] : Little walks  [de-identified] : No

## 2024-08-20 NOTE — HISTORY OF PRESENT ILLNESS
[FreeTextEntry1] : follow up [de-identified] : follow up  Patient with multiple medical issues HIV, anal cancer, pain, urinary issues s/p stents radiation caused a lot of problems -  has a colostomy and nephrostomy bag  would benefit from home nursing care for urostomy changes and supplies  with colostomy and nephrostomy- the wounds tend to get infected and would benefit from the home care to team to change the dressing and help prevent further infections   needs [facility] to help with supplies with help manage these issues  followed up with urology and now has seen neurology

## 2024-08-20 NOTE — ASSESSMENT
[FreeTextEntry1] : follow up  Patient with multiple medical issues HIV, anal cancer, pain, urinary issues s/p stents radiation caused a lot of problems -  has a colostomy and nephrostomy bag  would benefit from home nursing care for urostomy changes and supplies  with colostomy and nephrostomy- the wounds tend to get infected and would benefit from the home care to team to change the dressing and help prevent further infections   needs [place] home care to help with supplies with help manage these issues  followed up with urology and now has seen neurology

## 2024-08-20 NOTE — ED ADULT NURSE NOTE - IN THE PAST 12 MONTHS HAVE YOU USED DRUGS OTHER THAN THOSE REQUIRED FOR MEDICAL REASON?
Goal Outcome Evaluation:    Initial meeting note:    Therapist introduced self to patient and discussed psychotherapy service available to patient.     Pt response: Pt expressed interest in meeting with therapist    Plan: Made plan to meet with pt again; began identifying goals     Safety plan answers were collected, writer to enter in epic on Wednesday.                          No

## 2024-08-22 LAB
ALBUMIN SERPL ELPH-MCNC: 4.8 G/DL
ALP BLD-CCNC: 122 U/L
ALT SERPL-CCNC: 13 U/L
ANION GAP SERPL CALC-SCNC: 23 MMOL/L
AST SERPL-CCNC: 20 U/L
BASOPHILS # BLD AUTO: 0.05 K/UL
BASOPHILS NFR BLD AUTO: 0.6 %
BILIRUB SERPL-MCNC: <0.2 MG/DL
BUN SERPL-MCNC: 20 MG/DL
CALCIUM SERPL-MCNC: 9.7 MG/DL
CHLORIDE SERPL-SCNC: 102 MMOL/L
CHOLEST SERPL-MCNC: 171 MG/DL
CO2 SERPL-SCNC: 16 MMOL/L
CREAT SERPL-MCNC: 0.85 MG/DL
EGFR: 101 ML/MIN/1.73M2
EOSINOPHIL # BLD AUTO: 0.05 K/UL
EOSINOPHIL NFR BLD AUTO: 0.6 %
ESTIMATED AVERAGE GLUCOSE: 131 MG/DL
FERRITIN SERPL-MCNC: 230 NG/ML
FOLATE SERPL-MCNC: 12.9 NG/ML
GLUCOSE SERPL-MCNC: 97 MG/DL
HBA1C MFR BLD HPLC: 6.2 %
HCT VFR BLD CALC: 40.8 %
HDLC SERPL-MCNC: 40 MG/DL
HGB BLD-MCNC: 13.4 G/DL
IMM GRANULOCYTES NFR BLD AUTO: 0.3 %
IRON SATN MFR SERPL: 17 %
IRON SERPL-MCNC: 57 UG/DL
LDLC SERPL CALC-MCNC: 109 MG/DL
LYMPHOCYTES # BLD AUTO: 3.18 K/UL
LYMPHOCYTES NFR BLD AUTO: 35.9 %
MAN DIFF?: NORMAL
MCHC RBC-ENTMCNC: 29.1 PG
MCHC RBC-ENTMCNC: 32.8 GM/DL
MCV RBC AUTO: 88.7 FL
MONOCYTES # BLD AUTO: 1.01 K/UL
MONOCYTES NFR BLD AUTO: 11.4 %
NEUTROPHILS # BLD AUTO: 4.54 K/UL
NEUTROPHILS NFR BLD AUTO: 51.2 %
NONHDLC SERPL-MCNC: 131 MG/DL
PLATELET # BLD AUTO: 333 K/UL
POTASSIUM SERPL-SCNC: 4.2 MMOL/L
PROT SERPL-MCNC: 7.8 G/DL
RBC # BLD: 4.6 M/UL
RBC # FLD: 14.6 %
SODIUM SERPL-SCNC: 141 MMOL/L
TIBC SERPL-MCNC: 331 UG/DL
TRIGL SERPL-MCNC: 124 MG/DL
TSH SERPL-ACNC: 3.19 UIU/ML
UIBC SERPL-MCNC: 274 UG/DL
VIT B12 SERPL-MCNC: 492 PG/ML
WBC # FLD AUTO: 8.86 K/UL

## 2024-08-30 ENCOUNTER — APPOINTMENT (OUTPATIENT)
Dept: PODIATRY | Facility: CLINIC | Age: 59
End: 2024-08-30

## 2024-08-30 VITALS
HEIGHT: 71 IN | WEIGHT: 204 LBS | HEART RATE: 68 BPM | TEMPERATURE: 97.9 F | DIASTOLIC BLOOD PRESSURE: 89 MMHG | BODY MASS INDEX: 28.56 KG/M2 | SYSTOLIC BLOOD PRESSURE: 126 MMHG | OXYGEN SATURATION: 99 %

## 2024-08-30 PROCEDURE — 99213 OFFICE O/P EST LOW 20 MIN: CPT

## 2024-08-30 RX ORDER — CICLOPIROX 71.3 MG/ML
8 SOLUTION TOPICAL
Qty: 1 | Refills: 2 | Status: ACTIVE | COMMUNITY
Start: 2024-08-30 | End: 1900-01-01

## 2024-08-31 ENCOUNTER — INPATIENT (INPATIENT)
Facility: HOSPITAL | Age: 59
LOS: 3 days | Discharge: HOME | End: 2024-09-04
Attending: HOSPITALIST | Admitting: HOSPITALIST
Payer: MEDICAID

## 2024-08-31 VITALS
HEIGHT: 71 IN | RESPIRATION RATE: 19 BRPM | TEMPERATURE: 99 F | SYSTOLIC BLOOD PRESSURE: 128 MMHG | OXYGEN SATURATION: 99 % | WEIGHT: 203.93 LBS | DIASTOLIC BLOOD PRESSURE: 90 MMHG | HEART RATE: 78 BPM

## 2024-08-31 DIAGNOSIS — R22.32 LOCALIZED SWELLING, MASS AND LUMP, LEFT UPPER LIMB: ICD-10-CM

## 2024-08-31 DIAGNOSIS — B20 HUMAN IMMUNODEFICIENCY VIRUS [HIV] DISEASE: ICD-10-CM

## 2024-08-31 DIAGNOSIS — K62.9 DISEASE OF ANUS AND RECTUM, UNSPECIFIED: Chronic | ICD-10-CM

## 2024-08-31 DIAGNOSIS — I10 ESSENTIAL (PRIMARY) HYPERTENSION: ICD-10-CM

## 2024-08-31 DIAGNOSIS — N13.30 UNSPECIFIED HYDRONEPHROSIS: ICD-10-CM

## 2024-08-31 DIAGNOSIS — Z85.46 PERSONAL HISTORY OF MALIGNANT NEOPLASM OF PROSTATE: ICD-10-CM

## 2024-08-31 DIAGNOSIS — Z93.3 COLOSTOMY STATUS: Chronic | ICD-10-CM

## 2024-08-31 DIAGNOSIS — K59.00 CONSTIPATION, UNSPECIFIED: ICD-10-CM

## 2024-08-31 DIAGNOSIS — N39.0 URINARY TRACT INFECTION, SITE NOT SPECIFIED: ICD-10-CM

## 2024-08-31 DIAGNOSIS — Z96.0 PRESENCE OF UROGENITAL IMPLANTS: Chronic | ICD-10-CM

## 2024-08-31 DIAGNOSIS — Z98.890 OTHER SPECIFIED POSTPROCEDURAL STATES: Chronic | ICD-10-CM

## 2024-08-31 DIAGNOSIS — Z85.048 PERSONAL HISTORY OF OTHER MALIGNANT NEOPLASM OF RECTUM, RECTOSIGMOID JUNCTION, AND ANUS: ICD-10-CM

## 2024-08-31 DIAGNOSIS — Z29.9 ENCOUNTER FOR PROPHYLACTIC MEASURES, UNSPECIFIED: ICD-10-CM

## 2024-08-31 LAB
ALBUMIN SERPL ELPH-MCNC: 3.7 G/DL — SIGNIFICANT CHANGE UP (ref 3.3–5)
ALP SERPL-CCNC: 122 U/L — HIGH (ref 40–120)
ALT FLD-CCNC: 17 U/L — SIGNIFICANT CHANGE UP (ref 12–78)
ANION GAP SERPL CALC-SCNC: 5 MMOL/L — SIGNIFICANT CHANGE UP (ref 5–17)
APPEARANCE UR: ABNORMAL
AST SERPL-CCNC: 15 U/L — SIGNIFICANT CHANGE UP (ref 15–37)
BACTERIA # UR AUTO: ABNORMAL /HPF
BILIRUB SERPL-MCNC: 0.2 MG/DL — SIGNIFICANT CHANGE UP (ref 0.2–1.2)
BILIRUB UR-MCNC: NEGATIVE — SIGNIFICANT CHANGE UP
BUN SERPL-MCNC: 16 MG/DL — SIGNIFICANT CHANGE UP (ref 7–23)
CALCIUM SERPL-MCNC: 9.7 MG/DL — SIGNIFICANT CHANGE UP (ref 8.5–10.1)
CHLORIDE SERPL-SCNC: 105 MMOL/L — SIGNIFICANT CHANGE UP (ref 96–108)
CO2 SERPL-SCNC: 30 MMOL/L — SIGNIFICANT CHANGE UP (ref 22–31)
COLOR SPEC: YELLOW — SIGNIFICANT CHANGE UP
CREAT SERPL-MCNC: 0.96 MG/DL — SIGNIFICANT CHANGE UP (ref 0.5–1.3)
DIFF PNL FLD: ABNORMAL
EGFR: 92 ML/MIN/1.73M2 — SIGNIFICANT CHANGE UP
EPI CELLS # UR: PRESENT
GLUCOSE SERPL-MCNC: 102 MG/DL — HIGH (ref 70–99)
GLUCOSE UR QL: NEGATIVE MG/DL — SIGNIFICANT CHANGE UP
HCT VFR BLD CALC: 39.3 % — SIGNIFICANT CHANGE UP (ref 39–50)
HGB BLD-MCNC: 13.3 G/DL — SIGNIFICANT CHANGE UP (ref 13–17)
KETONES UR-MCNC: NEGATIVE MG/DL — SIGNIFICANT CHANGE UP
LACTATE SERPL-SCNC: 1.2 MMOL/L — SIGNIFICANT CHANGE UP (ref 0.7–2)
LEUKOCYTE ESTERASE UR-ACNC: ABNORMAL
MCHC RBC-ENTMCNC: 29.6 PG — SIGNIFICANT CHANGE UP (ref 27–34)
MCHC RBC-ENTMCNC: 33.8 G/DL — SIGNIFICANT CHANGE UP (ref 32–36)
MCV RBC AUTO: 87.3 FL — SIGNIFICANT CHANGE UP (ref 80–100)
NITRITE UR-MCNC: POSITIVE
NRBC # BLD: 0 /100 WBCS — SIGNIFICANT CHANGE UP (ref 0–0)
PH UR: 7 — SIGNIFICANT CHANGE UP (ref 5–8)
PLATELET # BLD AUTO: 263 K/UL — SIGNIFICANT CHANGE UP (ref 150–400)
POTASSIUM SERPL-MCNC: 4 MMOL/L — SIGNIFICANT CHANGE UP (ref 3.5–5.3)
POTASSIUM SERPL-SCNC: 4 MMOL/L — SIGNIFICANT CHANGE UP (ref 3.5–5.3)
PROT SERPL-MCNC: 7.9 GM/DL — SIGNIFICANT CHANGE UP (ref 6–8.3)
PROT UR-MCNC: 30 MG/DL
RBC # BLD: 4.5 M/UL — SIGNIFICANT CHANGE UP (ref 4.2–5.8)
RBC # FLD: 14.1 % — SIGNIFICANT CHANGE UP (ref 10.3–14.5)
RBC CASTS # UR COMP ASSIST: 10 /HPF — HIGH (ref 0–4)
SODIUM SERPL-SCNC: 140 MMOL/L — SIGNIFICANT CHANGE UP (ref 135–145)
SP GR SPEC: 1.01 — SIGNIFICANT CHANGE UP (ref 1–1.03)
UROBILINOGEN FLD QL: 0.2 MG/DL — SIGNIFICANT CHANGE UP (ref 0.2–1)
WBC # BLD: 8.6 K/UL — SIGNIFICANT CHANGE UP (ref 3.8–10.5)
WBC # FLD AUTO: 8.6 K/UL — SIGNIFICANT CHANGE UP (ref 3.8–10.5)
WBC UR QL: 50 /HPF — HIGH (ref 0–5)

## 2024-08-31 PROCEDURE — 99223 1ST HOSP IP/OBS HIGH 75: CPT

## 2024-08-31 PROCEDURE — 76775 US EXAM ABDO BACK WALL LIM: CPT | Mod: 26

## 2024-08-31 PROCEDURE — 99285 EMERGENCY DEPT VISIT HI MDM: CPT

## 2024-08-31 PROCEDURE — 99232 SBSQ HOSP IP/OBS MODERATE 35: CPT

## 2024-08-31 RX ORDER — ACETAMINOPHEN 325 MG/1
650 TABLET ORAL EVERY 6 HOURS
Refills: 0 | Status: DISCONTINUED | OUTPATIENT
Start: 2024-08-31 | End: 2024-09-04

## 2024-08-31 RX ORDER — ZOLPIDEM TARTRATE 5 MG/1
5 TABLET, FILM COATED ORAL AT BEDTIME
Refills: 0 | Status: DISCONTINUED | OUTPATIENT
Start: 2024-08-31 | End: 2024-09-04

## 2024-08-31 RX ORDER — MAGNESIUM, ALUMINUM HYDROXIDE 200-225/5
30 SUSPENSION, ORAL (FINAL DOSE FORM) ORAL EVERY 4 HOURS
Refills: 0 | Status: DISCONTINUED | OUTPATIENT
Start: 2024-08-31 | End: 2024-09-04

## 2024-08-31 RX ORDER — EZETIMIBE 10 MG/1
10 TABLET ORAL DAILY
Refills: 0 | Status: DISCONTINUED | OUTPATIENT
Start: 2024-08-31 | End: 2024-09-04

## 2024-08-31 RX ORDER — DILTIAZEM HYDROCHLORIDE 5 MG/ML
240 INJECTION INTRAVENOUS DAILY
Refills: 0 | Status: DISCONTINUED | OUTPATIENT
Start: 2024-08-31 | End: 2024-09-04

## 2024-08-31 RX ORDER — METOPROLOL TARTRATE 100 MG/1
100 TABLET ORAL DAILY
Refills: 0 | Status: DISCONTINUED | OUTPATIENT
Start: 2024-08-31 | End: 2024-09-04

## 2024-08-31 RX ORDER — ONDANSETRON 2 MG/ML
4 INJECTION, SOLUTION INTRAMUSCULAR; INTRAVENOUS EVERY 8 HOURS
Refills: 0 | Status: DISCONTINUED | OUTPATIENT
Start: 2024-08-31 | End: 2024-09-04

## 2024-08-31 RX ORDER — TAMSULOSIN HYDROCHLORIDE 0.4 MG/1
0.4 CAPSULE ORAL AT BEDTIME
Refills: 0 | Status: DISCONTINUED | OUTPATIENT
Start: 2024-08-31 | End: 2024-09-04

## 2024-08-31 RX ORDER — BICTEGRAVIR SODIUM, EMTRICITABINE, AND TENOFOVIR ALAFENAMIDE FUMARATE 50; 200; 25 MG/1; MG/1; MG/1
1 TABLET ORAL DAILY
Refills: 0 | Status: DISCONTINUED | OUTPATIENT
Start: 2024-08-31 | End: 2024-09-04

## 2024-08-31 RX ORDER — ALPRAZOLAM 0.25 MG
1 TABLET ORAL EVERY 6 HOURS
Refills: 0 | Status: DISCONTINUED | OUTPATIENT
Start: 2024-08-31 | End: 2024-09-04

## 2024-08-31 RX ORDER — LACTULOSE 10 G
10 PACKET (EA) ORAL DAILY
Refills: 0 | Status: DISCONTINUED | OUTPATIENT
Start: 2024-08-31 | End: 2024-09-04

## 2024-08-31 RX ORDER — SENNA 187 MG
1 TABLET ORAL DAILY
Refills: 0 | Status: DISCONTINUED | OUTPATIENT
Start: 2024-08-31 | End: 2024-09-04

## 2024-08-31 RX ORDER — HEPARIN SODIUM,BOVINE 1000/ML
5000 VIAL (ML) INJECTION EVERY 8 HOURS
Refills: 0 | Status: DISCONTINUED | OUTPATIENT
Start: 2024-08-31 | End: 2024-09-04

## 2024-08-31 RX ADMIN — ACETAMINOPHEN 650 MILLIGRAM(S): 325 TABLET ORAL at 23:50

## 2024-08-31 RX ADMIN — Medication 5000 UNIT(S): at 22:59

## 2024-08-31 RX ADMIN — ZOLPIDEM TARTRATE 5 MILLIGRAM(S): 5 TABLET, FILM COATED ORAL at 22:59

## 2024-08-31 RX ADMIN — ACETAMINOPHEN 650 MILLIGRAM(S): 325 TABLET ORAL at 22:59

## 2024-08-31 NOTE — H&P ADULT - NSHPLABSRESULTS_GEN_ALL_CORE
LABS:  cret                        13.3   8.60  )-----------( 263      ( 31 Aug 2024 16:25 )             39.3     08-31    140  |  105  |  16  ----------------------------<  102<H>  4.0   |  30  |  0.96    Ca    9.7      31 Aug 2024 16:25    TPro  7.9  /  Alb  3.7  /  TBili  0.2  /  DBili  x   /  AST  15  /  ALT  17  /  AlkPhos  122<H>  08-31    < from: US Renal (08.31.24 @ 18:37) >    IMPRESSION:  Moderate left hydronephrosis and proximal hydroureter.      < end of copied text >

## 2024-08-31 NOTE — CONSULT NOTE ADULT - SUBJECTIVE AND OBJECTIVE BOX
HPI:        Subjective/Observations: Pt. seen and examined and evaluated. Pt. resting comfortably in bed in NAD, with no respiratory distress, no chest pain, dyspnea, palpitations, PND, or orthopnea.    REVIEW OF SYSTEMS: All other review of systems is negative unless indicated above    PAST MEDICAL & SURGICAL HISTORY:  HTN (hypertension)  HLD (hyperlipidemia)  HIV infection  Anxiety  BPH (benign prostatic hyperplasia)  History of anal cancer  Anal lesion  S/P colostomy  History of gastroscopy  Ureteral stent present    MEDICATIONS  (STANDING):      Allergies:    No Known Allergies    Intolerances          Vital Signs Last 24 Hrs  T(C): 37.1 (31 Aug 2024 14:04), Max: 37.1 (31 Aug 2024 14:04)  T(F): 98.7 (31 Aug 2024 14:04), Max: 98.7 (31 Aug 2024 14:04)  HR: 78 (31 Aug 2024 14:04) (78 - 78)  BP: 128/90 (31 Aug 2024 14:04) (128/90 - 128/90)  BP(mean): --  RR: 19 (31 Aug 2024 14:04) (19 - 19)  SpO2: 99% (31 Aug 2024 14:04) (99% - 99%)    Parameters below as of 31 Aug 2024 14:04  Patient On (Oxygen Delivery Method): room air        I&O's Summary    Weight (kg): 92.5 (08-31 @ 14:04)        LABS: All Labs Reviewed:                        13.3   8.60  )-----------( 263      ( 31 Aug 2024 16:25 )             39.3     31 Aug 2024 16:25    140    |  105    |  16     ----------------------------<  102    4.0     |  30     |  0.96     Ca    9.7        31 Aug 2024 16:25    TPro  7.9    /  Alb  3.7    /  TBili  0.2    /  DBili  x      /  AST  15     /  ALT  17     /  AlkPhos  122    31 Aug 2024 16:25               Echo:    Cath:    EKG:     Interpretation of Telemetry:      Physical Exam:  Appearance: [ ] Normal  [ ] abnormal [ ] NAD   Eyes: [ ] PERRL [ ] EOMI  HEENT: [ ] Normal [ ] Abnormal oral mucosa [ ]NC/AT  Cardiovascular: [ ] S1 [ ] S2 [ ] RRR [ ] m/r/g [ ]edema [ ] JVP  Procedural Access Site: [ ]  hematoma [ ] tender to palpation [ ] 2+ pulse [ ] bruit [ ] Ecchymosis  Respiratory: [ ] Clear to auscultation bilaterally  Gastrointestinal: [ ] Soft [ ] tenderness[ ] distension [ ] BS  Musculoskeletal: [ ] clubbing [ ] joint deformity   Neurologic: [ ] Non-focal  Lymphatic: [ ] lymphadenopathy  Psychiatry: [ ] AAOx3  [ ] confused [ ] disoriented [ ] Mood & affect appropriate  Skin: [ ]  rashes [ ] ecchymoses [ ] cyanosis   HPI:   56-year-old male PMH HLD, anal cancer, colon cancer, (for which he he received radiation therapy) HIV  presents with issues related to his left nephrostomy bag. Pt. states that it has not been draining since last night and has been causing pain in his back. Pt. states that he and he wife attempted to flush drains and they were unsuccessful.  Pt. recently 7/19 had B/L nephrostomy tubes successfully exchanged and is due to routine exchange every 3 months He also reports that he developed a painful lump under his right armpit. The patient is primarily cared for by Dr. Schwartz, his primary care physician, and Dr. Mathew  urologist. He recently entered remission last month, after a three-year treatment period.    Subjective/Observations: Pt. seen and examined and evaluated. Pt. resting comfortably in bed in NAD, with no respiratory distress, no chest pain, dyspnea, palpitations, PND, or orthopnea.    REVIEW OF SYSTEMS: All other review of systems is negative unless indicated above    PAST MEDICAL & SURGICAL HISTORY:  HTN (hypertension)  HLD (hyperlipidemia)  HIV infection  Anxiety  BPH (benign prostatic hyperplasia)  History of anal cancer  Anal lesion  S/P colostomy  History of gastroscopy  Ureteral stent present    MEDICATIONS  (STANDING):      Allergies: No Known Allergies    Vital Signs Last 24 Hrs  T(C): 37.1 (31 Aug 2024 14:04), Max: 37.1 (31 Aug 2024 14:04)  T(F): 98.7 (31 Aug 2024 14:04), Max: 98.7 (31 Aug 2024 14:04)  HR: 78 (31 Aug 2024 14:04) (78 - 78)  BP: 128/90 (31 Aug 2024 14:04) (128/90 - 128/90)  BP(mean): --  RR: 19 (31 Aug 2024 14:04) (19 - 19)  SpO2: 99% (31 Aug 2024 14:04) (99% - 99%)    Parameters below as of 31 Aug 2024 14:04  Patient On (Oxygen Delivery Method): room air        I&O'sSummary    Weight (kg): 92.5 (08-31 @ 14:04)        LABS: All Labs Reviewed:                        13.3   8.60  )-----------( 263      ( 31 Aug 2024 16:25 )             39.3     31 Aug 2024 16:25    140    |  105    |  16     ----------------------------<  102    4.0     |  30     |  0.96     Ca    9.7        31 Aug 2024 16:25    TPro  7.9    /  Alb  3.7    /  TBili  0.2    /  DBili  x      /  AST  15     /  ALT  17     /  AlkPhos  122    31 Aug 2024 16:25        Physical Exam:  Appearance: [ ] Normal  [ ] abnormal [X ] NAD   Eyes: [ ] PERRL [ ] EOMI  HEENT: [ ] Normal [ ] Abnormal oral mucosa [ ]NC/AT  Cardiovascular: [X ] S1 [X ] S2 [ ] RRR [ ] m/r/g [ ]edema [ ] JVP  Procedural Access Site: [X ] left nephrostomy drain site benign no redness no swelling noted minimal urine noted in drainage bag   Respiratory: [X ] Clear to auscultation bilaterally  Gastrointestinal: [ ] Soft [ ] tenderness[ ] distension [ ] BS  Musculoskeletal: [ ] clubbing [ ] joint deformity   Neurologic: [ ] Non-focal  Lymphatic: [ ] lymphadenopathy  Psychiatry: [X ] AAOx3  [ ] confused [ ] disoriented [ ] Mood & affect appropriate  Skin: small painful abscess noted under left armpit     HPI:   56-year-old male PMH HLD, anal cancer, colon cancer, (for which he he received radiation therapy) HIV  presents with issues related to his left nephrostomy bag. Pt. states that it has not been draining since last night and has been causing pain in his back. Pt. states that he and he wife attempted to flush drains and they were unsuccessful.  Pt. recently 7/19 had B/L nephrostomy tubes successfully exchanged and is due to routine exchange every 3 months He also reports that he developed a painful lump under his right armpit. The patient is primarily cared for by Dr. Schwartz, his primary care physician, and Dr. Mathew  urologist. He recently entered remission last month, after a three-year treatment period.    Subjective/Observations: Pt. seen and examined and evaluated. Pt. resting comfortably in bed in NAD, with no respiratory distress, no chest pain, dyspnea, palpitations, PND, or orthopnea.    REVIEW OF SYSTEMS: All other review of systems is negative unless indicated above    PAST MEDICAL & SURGICAL HISTORY:  HTN (hypertension)  HLD (hyperlipidemia)  HIV infection  Anxiety  BPH (benign prostatic hyperplasia)  History of anal cancer  Anal lesion  S/P colostomy  History of gastroscopy  Ureteral stent present    MEDICATIONS  (STANDING):      Allergies: No Known Allergies    Vital Signs Last 24 Hrs  T(C): 37.1 (31 Aug 2024 14:04), Max: 37.1 (31 Aug 2024 14:04)  T(F): 98.7 (31 Aug 2024 14:04), Max: 98.7 (31 Aug 2024 14:04)  HR: 78 (31 Aug 2024 14:04) (78 - 78)  BP: 128/90 (31 Aug 2024 14:04) (128/90 - 128/90)  BP(mean): --  RR: 19 (31 Aug 2024 14:04) (19 - 19)  SpO2: 99% (31 Aug 2024 14:04) (99% - 99%)    Parameters below as of 31 Aug 2024 14:04  Patient On (Oxygen Delivery Method): room air        I&O'sSummary    Weight (kg): 92.5 (08-31 @ 14:04)        LABS: All Labs Reviewed:                        13.3   8.60  )-----------( 263      ( 31 Aug 2024 16:25 )             39.3     31 Aug 2024 16:25    140    |  105    |  16     ----------------------------<  102    4.0     |  30     |  0.96     Ca    9.7        31 Aug 2024 16:25    TPro  7.9    /  Alb  3.7    /  TBili  0.2    /  DBili  x      /  AST  15     /  ALT  17     /  AlkPhos  122    31 Aug 2024 16:25        Physical Exam:  Appearance: [ ] Normal  [ ] abnormal [X ] NAD   Eyes: [ ] PERRL [ ] EOMI  HEENT: [ ] Normal [ ] Abnormal oral mucosa [ ]NC/AT  Cardiovascular: [X ] S1 [X ] S2 [ ] RRR [ ] m/r/g [ ]edema [ ] JVP  Procedural Access Site: [X ] left nephrostomy drain site benign no redness no swelling noted minimal  urine noted in drainage bag right nephrostomy drain site benign no redness no swelling   Respiratory: [X ] Clear to auscultation bilaterally  Gastrointestinal: [ ] Soft [ ] tenderness[ ] distension [ ] BS  Musculoskeletal: [ ] clubbing [ ] joint deformity   Neurologic: [ ] Non-focal  Lymphatic: [ ] lymphadenopathy  Psychiatry: [X ] AAOx3  [ ] confused [ ] disoriented [ ] Mood & affect appropriate  Skin: small painful abscess noted under left armpit

## 2024-08-31 NOTE — ED PROVIDER NOTE - CLINICAL SUMMARY MEDICAL DECISION MAKING FREE TEXT BOX
Goodman DO: pt hx nephrostomy tubes, L nephrostomy tube not draining, renal fcn wnl, urology consulted and attempted to manipulate tube but still not draining, renal sono with moderate left hydronephrosis - d/w urology and recommended medicine admission for monitoring of renal function, no intervention planned for now, discussed w/ hospitalist. pt and family updated.

## 2024-08-31 NOTE — H&P ADULT - NSHPPHYSICALEXAM_GEN_ALL_CORE
GENERAL: NAD, lying in bed comfortably  HEAD:  Atraumatic, normocephalic  EYES: EOMI, PERRL  NECK: Supple, trachea midline, no JVD  HEART: Regular rate and rhythm  LUNGS: Unlabored respirations.  Clear to auscultation bilaterally, no crackles, wheezing, or rhonchi  ABDOMEN: Soft, nontender, nondistended, +BS, no CVA tenderness  EXTREMITIES: 2+ peripheral pulses bilaterally. No clubbing, cyanosis, or edema  SKIN: axillary raised area of skin with surrounding erythema  NERVOUS SYSTEM:  A&Ox3, moving all extremities, no focal deficits

## 2024-08-31 NOTE — PATIENT PROFILE ADULT - NSPRESCRALCFREQ_GEN_A_NUR
Subjective:       Patient ID: Yoana Thomas is a 28 y.o. female.    Oncology History   Mucoepidermoid carcinoma of salivary gland   3/21/2022 Surgery    EXCISION, PAROTID GLAND-nerve monitoring (Left)  CREATION, FLAP, MUSCLE ROTATION-SCM (Left)     4/4/2022 Initial Diagnosis    Mucoepidermoid carcinoma of salivary gland     4/4/2022 Cancer Staged    Staging form: Major Salivary Glands, AJCC 8th Edition  - Pathologic stage from 4/4/2022: Stage III (pT2, pN1, cM0)         Chief Complaint: post op  HPI     Yoana Thomas returns for a post op visit. She has been doing well since surgery. No complaints.    Past Medical History:   Diagnosis Date    Anxiety     Attention deficit hyperactivity disorder (ADHD), predominantly inattentive type 6/14/2021       Past Surgical History:   Procedure Laterality Date    CREATION OF MUSCLE ROTATIONAL FLAP Left 3/21/2022    Procedure: CREATION, FLAP, MUSCLE ROTATION-SCM;  Surgeon: Edel Hall MD;  Location: [facility] OR [address] [place] [address];  Service: ENT;  Laterality: Left;    EXCISION OF PAROTID GLAND Left 3/21/2022    Procedure: EXCISION, PAROTID GLAND-nerve monitoring;  Surgeon: Edel Hall MD;  Location: [facility] OR [address] [place] [address];  Service: ENT;  Laterality: Left;    KELOID EXCISION      Left ear and she also had radiation Tx    WISDOM TOOTH EXTRACTION           Current Outpatient Medications:     HYDROcodone-acetaminophen (NORCO) 5-325 mg per tablet, Take 1 tablet by mouth every 4 (four) hours as needed for Pain., Disp: 12 tablet, Rfl: 0    paroxetine (PAXIL) 40 MG tablet, TAKE 1 TABLET BY MOUTH EVERY DAY IN THE MORNING, Disp: 90 tablet, Rfl: 3    Review of patient's allergies indicates:  No Known Allergies    Social History     Socioeconomic History    Marital status: Single   Tobacco Use    Smoking status: Never Smoker    Smokeless tobacco: Never Used   Substance and Sexual Activity    Alcohol use: Yes     Comment: once every couple mos.     Drug use: Never     Sexual activity: Not Currently     Partners: Male     Birth control/protection: Condom       Family History   Problem Relation Age of Onset    Skin cancer Father     Hypertension Father     Infertility Sister     Diabetes type I Maternal Grandmother     Heart disease Maternal Grandmother     Kidney failure Maternal Grandmother         ESRD    Diabetes type II Maternal Grandfather     Lung cancer Paternal Grandfather        Review of Systems   Constitutional: Negative.    HENT: Negative.    Eyes: Negative.    Respiratory: Negative.    Cardiovascular: Negative.    Gastrointestinal: Negative.    Endocrine: Negative.    Genitourinary: Negative.    Musculoskeletal: Negative.    Integumentary:  Negative.   Allergic/Immunologic: Negative.    Neurological: Negative.    Hematological: Negative.    Psychiatric/Behavioral: Negative.          Objective:      Physical Exam  Constitutional:       Appearance: Normal appearance.   HENT:      Head: Normocephalic and atraumatic.      Right Ear: External ear normal.      Left Ear: External ear normal.   Neck:      Comments: Incision CDI.  No redness, drainage, or fluid collection noted.  Edges well approximated. 200 mg of Kenalog injected into incision by Dr. Hall with 27g needle.  Pulmonary:      Effort: Pulmonary effort is normal. No respiratory distress.   Neurological:      General: No focal deficit present.      Mental Status: She is alert.   Psychiatric:         Mood and Affect: Mood normal.         Behavior: Behavior normal.         Thought Content: Thought content normal.         Assessment:       Problem List Items Addressed This Visit        Oncology    Mucoepidermoid carcinoma of salivary gland     Incision injected with kenalog by Dr. Hall. Path report discussed. Questions answered. PET scan ordered. Will present her case at tumor board this week.           Relevant Orders    NM PET CT Routine FDG          Plan:       Problem List Items Addressed This Visit         Oncology    Mucoepidermoid carcinoma of salivary gland     Incision injected with kenalog by Dr. Hall. Path report discussed. Questions answered. PET scan ordered. Will present her case at tumor board this week.           Relevant Orders    NM PET CT Routine FDG                   Never

## 2024-08-31 NOTE — ED PROVIDER NOTE - OBJECTIVE STATEMENT
- History of Present Illness : David Turcios, a 56-year-old male with a complex medical history, presents with issues related to his left nephrostomy bag. He states that it has not been draining and has been causing pain in his back. He also reports that he developed a painful lump under his right armpit. Mr. Turcios has a history of prostate and anal cancer for which he had received radiation therapy. The treatments led to bladder damage and the need for a nephrostomy and colosotmy . The patient is primarily cared for by Dr. Schwartz, his primary care physician, and Dr. Mathew  urologist. He recently entered remission last month, after a three-year treatment period.

## 2024-08-31 NOTE — H&P ADULT - PROBLEM SELECTOR PLAN 1
-+ proximal hydroureter  - renal u/s reviewed as above  - urology consulted: monitor renal function; pt to be reassessed in am   - stable renal function, no fevers, rigors  - UTI, f/u urine culture. hx of recurring pseudomonas UTI  - start zosyn empirically

## 2024-08-31 NOTE — ED PROVIDER NOTE - CARE PLAN
1 Principal Discharge DX:	Malfunction of nephrostomy tube  Secondary Diagnosis:	Hydronephrosis, left

## 2024-08-31 NOTE — CONSULT NOTE ADULT - ASSESSMENT
56-year-old male PMH HLD, anal cancer, colon cancer, (for which he received radiation therapy) HIV  presents with issues related to his left nephrostomy bag. Pt. states that it has not been draining since last night and has been causing pain in his back. Pt. states that he and he wife attempted to flush drains and they were unsuccessful.  Pt. recently 7/19 had B/L nephrostomy tubes successfully exchanged and is due to routine exchange every 3 months He also reports that he developed a painful lump under his right armpit. The patient is primarily cared for by Dr. Schwartz, his primary care physician, and Dr. Mathew  urologist. He recently entered remission last month, after a three-year treatment period.    -Labs   -Renal ultrasound to r/o hydronephrosis   -Plan of care D/W Quin[facility] BARGER

## 2024-08-31 NOTE — ED ADULT TRIAGE NOTE - CHIEF COMPLAINT QUOTE
Patient presents to ED c/o left nephrostomy tube pain and less drainage then usually since yesterday even after irrigating tube. Took Tylenol with partial relief. Patient also c/o generalized abdominal pain  x2 days and has not had any contents either. Patient also c/o lump to left armpit since x 1 week.  hx anal cancer, HIV, colon cancer HLD

## 2024-08-31 NOTE — H&P ADULT - HISTORY OF PRESENT ILLNESS
58 years old male with h/o HTN, HLD, HIV on HAART ( CD 4 608, VL undetectable on 2/12/24), h/o jared Ca (s/p chemo, radio therapy, surgery, colostomy 03/2022), prostate Ca s/p radiotherapy, ureteral stricture s/p stent and b/l nephrostomy tubes, multiple admissions with UTI ( urine culture with pseudomonas) presented  to ED with complain of L marvin pain and l nephrostomy tube not drainng as usual. Pt denies any fevers, rigors, n/v/d, or hematuria;  does c/o constipation and no output in colostomy bag in the last 2 days. Pt also complains of L painful axillary lump for the last 2 days.   In ED, VS stable. Labs unremarkable; normal renal function. UA with UTI. Renal u/s  revealed Moderate left hydronephrosis and proximal hydroureter. urology was consulted who recommends monitoring kidney function at this time; to be reassess in am.

## 2024-08-31 NOTE — PATIENT PROFILE ADULT - NSPROPASSIVESMOKEEXPOSURE_GEN_A_NUR
Virtual or Telephone Consent    An interactive audio and video telecommunication system which permits real time communications between the patient (at the originating site) and provider (at the distant site) was utilized to provide this telehealth service.   Verbal consent was requested and obtained from Nelson Osborn on this date, 07/15/24 for a telehealth visit.       Assessment/Plan     Impression:  Nelson Osborn is a 25 y.o. female who presents via Telehealth for a follow up visit with CC of Anxiety, Depression, and Med Management.     Patient consents to treatment.  Chief Complaint   Patient presents with    Anxiety    Depression    Med Management      Problem List Items Addressed This Visit             ICD-10-CM    Bipolar 1 disorder (Multi) F31.9    Relevant Medications    sertraline (Zoloft) 50 mg tablet (Start on 8/19/2024)    ARIPiprazole (Abilify) 15 mg tablet (Start on 8/19/2024)    DOMO (generalized anxiety disorder) F41.1    Relevant Medications    busPIRone (Buspar) 5 mg tablet (Start on 7/25/2024)    hydrOXYzine pamoate (Vistaril) 25 mg capsule (Start on 8/19/2024)    melatonin 10 mg tablet (Start on 8/19/2024)    sertraline (Zoloft) 50 mg tablet (Start on 8/19/2024)    PTSD (post-traumatic stress disorder) F43.10    Relevant Medications    prazosin (Minipress) 1 mg capsule (Start on 8/19/2024)       Patient is reminded that if there is SI to call [phone] and get themselves to the closest ED for evaluation, otherwise contact me for other questions/concerns.    Patient presenting to outpatient treatment for a scheduled psych follow up visit.      HPI   Background:     Patient was seen a couple of weeks ago for management of depressive, anxiety and johann. Patient reports she has been having high anxiety at night out of nowhere. Patient explains that she is no longer pregnant.   Patient reports the medication has not helped with anxiety symptoms.  Patient hopes to feel less  anxious.    Characteristics/Recent psychiatric symptoms (pertinent positives and negatives):      Patient reports experiencing anxiety characterized by uncontrolled worry, mind racing, restlessness, paranoid. Denies wishes for death or consideration for suicide.  CSSRS negative. Reports not sleeping well at night and waking up every hours due to high anxiety.   Patient ranks the severity of anxiety using the DOMO 7 scale with a rating of 21 for anxiety symptoms. Patient denies depressive symptoms. Patient does explain that current dose of Buspar has not been successful in the management of anxiety symptoms.     NOTE: Symptom scale is rated where 0 = no symptoms at all, and 10 = symptoms so severe that pt is an imminent danger to themselves or others and requires hospitalization.    Anxiety remain(s) present more days than not, which has worsened  over the past few weeks. Nelson Anurag rates the severity of psych symptoms as a 7/10, noting symptom improvement with taking medications and worsening of symptoms with recent events.    -SI/HI : Denies        Psychiatric Review Of Systems:  Depressive Symptoms: negative  Manic Symptoms: negative  Anxiety Symptoms: General Anxiety Disorder (DOMO)DOMO Behaviors: difficult to control worry, excessive anxiety/worry, difficulty concentrating, irritability, and sleep disturbance  Psychotic Symptoms: negative      REVIEW OF SYSTEMS  Review of Systems   Constitutional: Negative.    Psychiatric/Behavioral:  The patient is nervous/anxious.      All other ROS negative    Current Medications:    Current Outpatient Medications:     rOPINIRole (Requip) 0.5 mg tablet, , Disp: , Rfl:     [START ON 8/19/2024] ARIPiprazole (Abilify) 15 mg tablet, Take 1 tablet (15 mg) by mouth once daily. Take half a dose ( 7.5 mg) in the morning Do not fill before August 19, 2024., Disp: 90 tablet, Rfl: 0    [START ON 7/25/2024] busPIRone (Buspar) 5 mg tablet, Take 1 tablet (5 mg) by mouth 3 times a day. Take  two pill in the morning and one pill at night Do not fill before July 25, 2024., Disp: 270 tablet, Rfl: 0    [START ON 8/19/2024] hydrOXYzine pamoate (Vistaril) 25 mg capsule, Take 1 capsule (25 mg) by mouth 3 times a day as needed for anxiety. Take 1 pill every 8 hours as needed for anxiety Do not fill before August 19, 2024., Disp: 90 capsule, Rfl: 0    [START ON 8/19/2024] melatonin 10 mg tablet, Take 1 tablet (10 mg) by mouth once daily. Take 1 pill before bed Do not fill before August 19, 2024., Disp: 90 tablet, Rfl: 0    [START ON 8/19/2024] prazosin (Minipress) 1 mg capsule, Take 1 capsule (1 mg) by mouth once daily at bedtime. Take 1 pill at bed time Do not fill before August 19, 2024., Disp: 90 capsule, Rfl: 0    [START ON 8/19/2024] sertraline (Zoloft) 50 mg tablet, Take 1 tablet (50 mg) by mouth once daily. Take 1 pill in the morning Do not fill before August 19, 2024., Disp: 90 tablet, Rfl: 0     Medical History:  Past Medical History:   Diagnosis Date    Addiction to drug (Multi)     ADHD (attention deficit hyperactivity disorder)     Alcohol abuse     Alcoholism (Multi)     Anxiety     Bipolar 1 disorder (Multi)     Depression     Hallucination     Memory loss     Panic attack     PTSD (post-traumatic stress disorder)     Schizoaffective disorder (Multi)     Sleep difficulties     Substance abuse (Multi)     Suicide attempt (Multi)      Surgical History:  Past Surgical History:   Procedure Laterality Date    APPENDECTOMY      HERNIA REPAIR      WISDOM TOOTH EXTRACTION       Family History:  Family History   Problem Relation Name Age of Onset    Hypertension Mother      Colon polyps Mother      Sleep apnea Father      Other (dm 2) Maternal Grandmother      Colon cancer Maternal Grandfather      Sleep apnea Paternal Grandmother       Social History:  Social History     Socioeconomic History    Marital status: Single     Spouse name: Not on file    Number of children: Not on file    Years of education:  "Not on file    Highest education level: Not on file   Occupational History    Not on file   Tobacco Use    Smoking status: Every Day     Current packs/day: 1.00     Average packs/day: 1 pack/day for 5.0 years (5.0 ttl pk-yrs)     Types: Cigarettes     Passive exposure: Current    Smokeless tobacco: Never    Tobacco comments:     Vapes daily   Vaping Use    Vaping status: Every Day    Substances: Nicotine   Substance and Sexual Activity    Alcohol use: Not Currently    Drug use: Not Currently     Types: Cocaine, MDMA (ecstacy)     Comment: history of coacaine, ecstacy and marijuana use    Sexual activity: Yes     Partners: Female, Male     Birth control/protection: Condom Male   Other Topics Concern    Not on file   Social History Narrative    Not on file     Social Determinants of Health     Financial Resource Strain: Not on file   Food Insecurity: Not on file   Transportation Needs: Not on file   Physical Activity: Not on file   Stress: Not on file   Social Connections: Not on file   Intimate Partner Violence: Not on file     Vitals:  There were no vitals filed for this visit.  Allergies:  No Known Allergies    -PCP: Romina Cloud MD  -Pt reports currently is not pregnant, and currently is sexually active, does not use birth control.  -TBI/head trauma/LOC/seizure hx: Denies    Objective   Appearance: Well groomed    Attitude: Cooperative, and engaged in conversation.    Behavior: Appropriate eye contact.     Motor Activity: No psychomotor agitation or retardation. No abnormal movements, tremors or tics. No evidence of extrapyramidal symptoms or tardive dyskinesia.    Speech: Regular rate, rhythm, volume. Spontaneous, no pressured speech.    Mood:  \" I am okay \"    Affect:  full range, mood congruent.    Thought Process: Linear, logical, and goal-directed. No loose associations or gross thought disorganization.    Thought Content: Denied current suicidal ideation or thoughts of harm to self, denied homicidal " ideation or thoughts of harm to others. No delusional thinking elicited. No perseverations or obsessions identified.     Perception: Did not endorse auditory or visual hallucinations, did not appear to be responding to hallucinatory stimuli.     Cognition: Alert, oriented x3. Preserved attention span and concentration, recent and remote memory. Adequate fund of knowledge. No deficits in language.     Insight: Fair, in regards to understanding mental health condition    Judgement: Fair        Physical Exam    IMPRESSION  -Partial  Benefit  for anxiety symptoms with Buspar, recommend to optimize the dose a total of 15 mg a day, taken 10 mg in the mornings and 5 mg at night.      Nelson was seen today for anxiety, depression and med management.  Diagnoses and all orders for this visit:  DOMO (generalized anxiety disorder)  -     busPIRone (Buspar) 5 mg tablet; Take 1 tablet (5 mg) by mouth 3 times a day. Take two pill in the morning and one pill at night Do not fill before July 25, 2024.  -     hydrOXYzine pamoate (Vistaril) 25 mg capsule; Take 1 capsule (25 mg) by mouth 3 times a day as needed for anxiety. Take 1 pill every 8 hours as needed for anxiety Do not fill before August 19, 2024.  -     melatonin 10 mg tablet; Take 1 tablet (10 mg) by mouth once daily. Take 1 pill before bed Do not fill before August 19, 2024.  -     sertraline (Zoloft) 50 mg tablet; Take 1 tablet (50 mg) by mouth once daily. Take 1 pill in the morning Do not fill before August 19, 2024.  PTSD (post-traumatic stress disorder)  -     prazosin (Minipress) 1 mg capsule; Take 1 capsule (1 mg) by mouth once daily at bedtime. Take 1 pill at bed time Do not fill before August 19, 2024.  Bipolar 1 disorder (Multi)  -     sertraline (Zoloft) 50 mg tablet; Take 1 tablet (50 mg) by mouth once daily. Take 1 pill in the morning Do not fill before August 19, 2024.  -     ARIPiprazole (Abilify) 15 mg tablet; Take 1 tablet (15 mg) by mouth once daily. Take  half a dose ( 7.5 mg) in the morning Do not fill before August 19, 2024.         MEDICAL-DECISION MAKING  - Partial benefit for anxiety on current dose of Buspar. Dose will be increased to 10 mg in the mornings and 5 mg at night  - Patient educated and verbalized understanding on benefits, risks, and side effects of current medications. . Follow-up with psychiatry in 6 weeks for medication evaluation and effectiveness.        Psych med regimen as follows:   1. Increase Buspar to 10 mg in the mornings and then 5 mg at night.      SI/HI ASSESSMENT  -Risk Assessment: The pt is currently a low acute risk of suicide and self-harm despite past suicide attempt(s) and not currently endorsing thoughts of suicide. The pt is currently a low acute risk of violence and harm to others due to no past history of violence and not currently threatening others.  -Suicidal Risk Factors:  and current psychiatric illness  -Violence Risk Factors: Current psychiatric illness  -Protective Factors: fear of suicide  -Plan to Reduce Risk: Establish medication regimen and outpatient follow-up care  Denied current suicidal ideation or thoughts of harm to self, denied homicidal ideation or thoughts of harm to others. No delusional thinking elicited. No perseverations or obsessions identified.       PLAN  -Continue Medications as directed.  -Follow-up with this provider in 6 weeks.  -Risks/benefits/assessment of medication interventions discussed with pt; pt agreeable to plan. Will continue to monitor for symptoms mgmt and SEs and adjust plan as needed.  -MI to increase coping skills/behavior regulation.  -Safety plan reviewed.  -Call  Psychiatry at [phone] with issues.  -For [facility] residents, [facility] is a 24/7 hotline you can call for assistance at [phone]. Please call 911 or go to your closest Emergency Room if you feel worse. This includes thoughts of hurting yourself or anyone else, or having other  troubles such as hearing voices, seeing visions, or having new and scary thoughts about the people around you.    OARRS:  No data recorded  I have personally reviewed the OARRS report for Nelson Osborn. I have considered the risks of abuse, dependence, addiction and diversion  Medication is felt to be clinically appropriate based on documented diagnosis.    Recent Results (from the past 8760 hour(s))   Drug Screen, Urine    Collection Time: 05/04/24 12:29 AM   Result Value Ref Range    Amphetamine Screen, Urine Presumptive Negative Presumptive Negative    Barbiturate Screen, Urine Presumptive Negative Presumptive Negative    Benzodiazepines Screen, Urine Presumptive Negative Presumptive Negative    Cannabinoid Screen, Urine Presumptive Negative Presumptive Negative    Cocaine Metabolite Screen, Urine Presumptive Positive (A) Presumptive Negative    Fentanyl Screen, Urine Presumptive Negative Presumptive Negative    Opiate Screen, Urine Presumptive Negative Presumptive Negative    Oxycodone Screen, Urine Presumptive Negative Presumptive Negative    PCP Screen, Urine Presumptive Negative Presumptive Negative    Methadone Screen, Urine Presumptive Negative Presumptive Negative   Drug Screen, Urine With Reflex to Confirmation    Collection Time: 05/03/24 12:59 PM   Result Value Ref Range    Amphetamine Screen, Urine Presumptive Negative Presumptive Negative    Barbiturate Screen, Urine Presumptive Negative Presumptive Negative    Benzodiazepines Screen, Urine Presumptive Negative Presumptive Negative    Cannabinoid Screen, Urine Presumptive Negative Presumptive Negative    Cocaine Metabolite Screen, Urine Presumptive Negative Presumptive Negative    Fentanyl Screen, Urine Presumptive Negative Presumptive Negative    Opiate Screen, Urine Presumptive Negative Presumptive Negative    Oxycodone Screen, Urine Presumptive Negative Presumptive Negative    PCP Screen, Urine Presumptive Negative Presumptive Negative    Methadone  Screen, Urine Presumptive Negative Presumptive Negative   Confirmation Opiate/Opioid/Benzo Prescription Compliance    Collection Time: 04/29/24 12:01 PM   Result Value Ref Range    Clonazepam <25 <25 ng/mL    7-Aminoclonazepam <25 <25 ng/mL    Alprazolam <25 <25 ng/mL    Alpha-Hydroxyalprazolam <25 <25 ng/mL    Midazolam <25 <25 ng/mL    Alpha-Hydroxymidazolam <25 <25 ng/mL    Chlordiazepoxide <25 <25 ng/mL    Diazepam <25 <25 ng/mL    Nordiazepam <25 <25 ng/mL    Temazepam <25 <25 ng/mL    Oxazepam <25 <25 ng/mL    Lorazepam <25 <25 ng/mL    Methadone <25 <25 ng/mL    EDDP <25 <25 ng/mL    6-Acetylmorphine <25 <25 ng/mL    Codeine <50 <50 ng/mL    Hydrocodone <25 <25 ng/mL    Hydromorphone <25 <25 ng/mL    Morphine  <50 <50 ng/mL    Norhydrocodone <25 <25 ng/mL    Noroxycodone <25 <25 ng/mL    Oxycodone <25 <25 ng/mL    Oxymorphone <25 <25 ng/mL    Fentanyl <2.5 <2.5 ng/mL    Norfentanyl <2.5 <2.5 ng/mL    Tramadol <50 <50 ng/mL    O-Desmethyltramadol <50 <50 ng/mL    Zolpidem <25 <25 ng/mL    Zolpidem Metabolite (ZCA) <25 <25 ng/mL   Screen Opiate/Opioid/Benzo Prescription Compliance    Collection Time: 04/29/24 12:01 PM   Result Value Ref Range    Creatinine, Urine Random 43.1 20.0 - 320.0 mg/dL    Amphetamine Screen, Urine Presumptive Negative Presumptive Negative    Barbiturate Screen, Urine Presumptive Negative Presumptive Negative    Cannabinoid Screen, Urine Presumptive Negative Presumptive Negative    Cocaine Metabolite Screen, Urine Presumptive Positive (A) Presumptive Negative    PCP Screen, Urine Presumptive Negative Presumptive Negative         CHAPINCITO Curtis-CNP  Record Review: extensive    Follow up:   August 26th at 230 virtually    Time Spent:  Prep:   Direct time: 30 minutes  Documentation: 5 minutes  Total: 35 minutes   Unknown

## 2024-08-31 NOTE — H&P ADULT - ASSESSMENT
58 years old male with h/o HTN, HLD, HIV on HAART ( CD 4 608, VL undetectable on 2/12/24), h/o jared Ca (s/p chemo, radio therapy, surgery, colostomy 03/2022), prostate Ca s/p radiotherapy, ureteral stricture s/p stent and b/l nephrostomy tubes, multiple admissions with UTI ( urine culture with pseudomonas) presented  to ED with complain of L marvin pain and l nephrostomy tube not drainng as usual. Pt found with UTI and L moderate hydronephrosis. Urology recommends medicine admission for Cr monitoring pending definitive plan.

## 2024-08-31 NOTE — ED ADULT TRIAGE NOTE - HEIGHT IN FEET
PAST SURGICAL HISTORY:  H/O chest tube placement Left side 2014    H/O circumcision     S/P thoracostomy tube placement     
5

## 2024-08-31 NOTE — PATIENT PROFILE ADULT - HAVE YOU EXPERIENCED VIOLENCE OR A TRAUMATIC EVENT?
no Implemented All Universal Safety Interventions:  [place] to call system. Call bell, personal items and telephone within reach. Instruct patient to call for assistance. Room bathroom lighting operational. Non-slip footwear when patient is off stretcher. Physically safe environment: no spills, clutter or unnecessary equipment. Stretcher in lowest position, wheels locked, appropriate side rails in place.

## 2024-08-31 NOTE — ED ADULT NURSE NOTE - ED STAT RN HANDOFF DETAILS
Assisting primary Rn Adriane Report given to RN Severe, pts history, current condition and reason for admission discussed, safety concerns addressed and reviewed, pt currently in stable condition, IV flushes for patency and site shows no signs or symptoms of infiltrate, dressing is clean dry and intact, pt is aware of plan of care. Pt education deemed successful at time of report after patient demonstrates successful teach back for proficiency. No acute distress noted. respirations even and unlabored.

## 2024-09-01 LAB
ALBUMIN SERPL ELPH-MCNC: 3.5 G/DL — SIGNIFICANT CHANGE UP (ref 3.3–5)
ALP SERPL-CCNC: 113 U/L — SIGNIFICANT CHANGE UP (ref 40–120)
ALT FLD-CCNC: 20 U/L — SIGNIFICANT CHANGE UP (ref 12–78)
ANION GAP SERPL CALC-SCNC: 6 MMOL/L — SIGNIFICANT CHANGE UP (ref 5–17)
AST SERPL-CCNC: 8 U/L — LOW (ref 15–37)
BASOPHILS # BLD AUTO: 0.03 K/UL — SIGNIFICANT CHANGE UP (ref 0–0.2)
BASOPHILS NFR BLD AUTO: 0.3 % — SIGNIFICANT CHANGE UP (ref 0–2)
BILIRUB SERPL-MCNC: 0.8 MG/DL — SIGNIFICANT CHANGE UP (ref 0.2–1.2)
BUN SERPL-MCNC: 13 MG/DL — SIGNIFICANT CHANGE UP (ref 7–23)
CALCIUM SERPL-MCNC: 9.3 MG/DL — SIGNIFICANT CHANGE UP (ref 8.5–10.1)
CHLORIDE SERPL-SCNC: 108 MMOL/L — SIGNIFICANT CHANGE UP (ref 96–108)
CO2 SERPL-SCNC: 25 MMOL/L — SIGNIFICANT CHANGE UP (ref 22–31)
CREAT SERPL-MCNC: 0.9 MG/DL — SIGNIFICANT CHANGE UP (ref 0.5–1.3)
EGFR: 99 ML/MIN/1.73M2 — SIGNIFICANT CHANGE UP
EOSINOPHIL # BLD AUTO: 0.03 K/UL — SIGNIFICANT CHANGE UP (ref 0–0.5)
EOSINOPHIL NFR BLD AUTO: 0.3 % — SIGNIFICANT CHANGE UP (ref 0–6)
GLUCOSE SERPL-MCNC: 112 MG/DL — HIGH (ref 70–99)
HCT VFR BLD CALC: 39 % — SIGNIFICANT CHANGE UP (ref 39–50)
HGB BLD-MCNC: 13.1 G/DL — SIGNIFICANT CHANGE UP (ref 13–17)
IMM GRANULOCYTES NFR BLD AUTO: 0.3 % — SIGNIFICANT CHANGE UP (ref 0–0.9)
LYMPHOCYTES # BLD AUTO: 2.71 K/UL — SIGNIFICANT CHANGE UP (ref 1–3.3)
LYMPHOCYTES # BLD AUTO: 26.5 % — SIGNIFICANT CHANGE UP (ref 13–44)
MCHC RBC-ENTMCNC: 28.9 PG — SIGNIFICANT CHANGE UP (ref 27–34)
MCHC RBC-ENTMCNC: 33.6 G/DL — SIGNIFICANT CHANGE UP (ref 32–36)
MCV RBC AUTO: 85.9 FL — SIGNIFICANT CHANGE UP (ref 80–100)
MONOCYTES # BLD AUTO: 1.17 K/UL — HIGH (ref 0–0.9)
MONOCYTES NFR BLD AUTO: 11.5 % — SIGNIFICANT CHANGE UP (ref 2–14)
NEUTROPHILS # BLD AUTO: 6.24 K/UL — SIGNIFICANT CHANGE UP (ref 1.8–7.4)
NEUTROPHILS NFR BLD AUTO: 61.1 % — SIGNIFICANT CHANGE UP (ref 43–77)
NRBC # BLD: 0 /100 WBCS — SIGNIFICANT CHANGE UP (ref 0–0)
PLATELET # BLD AUTO: 260 K/UL — SIGNIFICANT CHANGE UP (ref 150–400)
POTASSIUM SERPL-MCNC: 3.5 MMOL/L — SIGNIFICANT CHANGE UP (ref 3.5–5.3)
POTASSIUM SERPL-SCNC: 3.5 MMOL/L — SIGNIFICANT CHANGE UP (ref 3.5–5.3)
PROT SERPL-MCNC: 7.6 GM/DL — SIGNIFICANT CHANGE UP (ref 6–8.3)
RBC # BLD: 4.54 M/UL — SIGNIFICANT CHANGE UP (ref 4.2–5.8)
RBC # FLD: 14 % — SIGNIFICANT CHANGE UP (ref 10.3–14.5)
SODIUM SERPL-SCNC: 139 MMOL/L — SIGNIFICANT CHANGE UP (ref 135–145)
WBC # BLD: 10.21 K/UL — SIGNIFICANT CHANGE UP (ref 3.8–10.5)
WBC # FLD AUTO: 10.21 K/UL — SIGNIFICANT CHANGE UP (ref 3.8–10.5)

## 2024-09-01 PROCEDURE — 99232 SBSQ HOSP IP/OBS MODERATE 35: CPT

## 2024-09-01 PROCEDURE — 76882 US LMTD JT/FCL EVL NVASC XTR: CPT | Mod: 26,LT

## 2024-09-01 RX ORDER — ERGOCALCIFEROL (VITAMIN D2) 200 MCG/ML
50000 DROPS ORAL
Refills: 0 | Status: DISCONTINUED | OUTPATIENT
Start: 2024-09-01 | End: 2024-09-04

## 2024-09-01 RX ORDER — PIPERACILLIN SODIUM AND TAZOBACTAM SODIUM 3; .375 G/15ML; G/15ML
3.38 INJECTION, POWDER, FOR SOLUTION INTRAVENOUS ONCE
Refills: 0 | Status: COMPLETED | OUTPATIENT
Start: 2024-09-01 | End: 2024-09-01

## 2024-09-01 RX ORDER — PIPERACILLIN SODIUM AND TAZOBACTAM SODIUM 3; .375 G/15ML; G/15ML
3.38 INJECTION, POWDER, FOR SOLUTION INTRAVENOUS EVERY 8 HOURS
Refills: 0 | Status: DISCONTINUED | OUTPATIENT
Start: 2024-09-01 | End: 2024-09-04

## 2024-09-01 RX ADMIN — EZETIMIBE 10 MILLIGRAM(S): 10 TABLET ORAL at 13:04

## 2024-09-01 RX ADMIN — PIPERACILLIN SODIUM AND TAZOBACTAM SODIUM 25 GRAM(S): 3; .375 INJECTION, POWDER, FOR SOLUTION INTRAVENOUS at 21:18

## 2024-09-01 RX ADMIN — Medication 1 TABLET(S): at 12:52

## 2024-09-01 RX ADMIN — Medication 3 MILLIGRAM(S): at 21:18

## 2024-09-01 RX ADMIN — Medication 5000 UNIT(S): at 14:58

## 2024-09-01 RX ADMIN — Medication 5000 UNIT(S): at 05:16

## 2024-09-01 RX ADMIN — PIPERACILLIN SODIUM AND TAZOBACTAM SODIUM 200 GRAM(S): 3; .375 INJECTION, POWDER, FOR SOLUTION INTRAVENOUS at 05:16

## 2024-09-01 RX ADMIN — ZOLPIDEM TARTRATE 5 MILLIGRAM(S): 5 TABLET, FILM COATED ORAL at 21:17

## 2024-09-01 RX ADMIN — BICTEGRAVIR SODIUM, EMTRICITABINE, AND TENOFOVIR ALAFENAMIDE FUMARATE 1 TABLET(S): 50; 200; 25 TABLET ORAL at 12:52

## 2024-09-01 RX ADMIN — PIPERACILLIN SODIUM AND TAZOBACTAM SODIUM 25 GRAM(S): 3; .375 INJECTION, POWDER, FOR SOLUTION INTRAVENOUS at 09:32

## 2024-09-01 RX ADMIN — DILTIAZEM HYDROCHLORIDE 240 MILLIGRAM(S): 5 INJECTION INTRAVENOUS at 05:16

## 2024-09-01 RX ADMIN — PIPERACILLIN SODIUM AND TAZOBACTAM SODIUM 25 GRAM(S): 3; .375 INJECTION, POWDER, FOR SOLUTION INTRAVENOUS at 14:57

## 2024-09-01 RX ADMIN — METOPROLOL TARTRATE 100 MILLIGRAM(S): 100 TABLET ORAL at 05:16

## 2024-09-01 RX ADMIN — ZOLPIDEM TARTRATE 5 MILLIGRAM(S): 5 TABLET, FILM COATED ORAL at 00:40

## 2024-09-01 RX ADMIN — Medication 5000 UNIT(S): at 21:18

## 2024-09-01 RX ADMIN — Medication 10 GRAM(S): at 12:51

## 2024-09-01 RX ADMIN — TAMSULOSIN HYDROCHLORIDE 0.4 MILLIGRAM(S): 0.4 CAPSULE ORAL at 21:17

## 2024-09-02 LAB
ANION GAP SERPL CALC-SCNC: 5 MMOL/L — SIGNIFICANT CHANGE UP (ref 5–17)
BUN SERPL-MCNC: 16 MG/DL — SIGNIFICANT CHANGE UP (ref 7–23)
CALCIUM SERPL-MCNC: 9.3 MG/DL — SIGNIFICANT CHANGE UP (ref 8.5–10.1)
CHLORIDE SERPL-SCNC: 108 MMOL/L — SIGNIFICANT CHANGE UP (ref 96–108)
CO2 SERPL-SCNC: 28 MMOL/L — SIGNIFICANT CHANGE UP (ref 22–31)
CREAT SERPL-MCNC: 0.99 MG/DL — SIGNIFICANT CHANGE UP (ref 0.5–1.3)
EGFR: 88 ML/MIN/1.73M2 — SIGNIFICANT CHANGE UP
GLUCOSE SERPL-MCNC: 101 MG/DL — HIGH (ref 70–99)
HCT VFR BLD CALC: 37.9 % — LOW (ref 39–50)
HGB BLD-MCNC: 12.5 G/DL — LOW (ref 13–17)
MCHC RBC-ENTMCNC: 28.9 PG — SIGNIFICANT CHANGE UP (ref 27–34)
MCHC RBC-ENTMCNC: 33 G/DL — SIGNIFICANT CHANGE UP (ref 32–36)
MCV RBC AUTO: 87.7 FL — SIGNIFICANT CHANGE UP (ref 80–100)
NRBC # BLD: 0 /100 WBCS — SIGNIFICANT CHANGE UP (ref 0–0)
PLATELET # BLD AUTO: 256 K/UL — SIGNIFICANT CHANGE UP (ref 150–400)
POTASSIUM SERPL-MCNC: 3.8 MMOL/L — SIGNIFICANT CHANGE UP (ref 3.5–5.3)
POTASSIUM SERPL-SCNC: 3.8 MMOL/L — SIGNIFICANT CHANGE UP (ref 3.5–5.3)
RBC # BLD: 4.32 M/UL — SIGNIFICANT CHANGE UP (ref 4.2–5.8)
RBC # FLD: 14 % — SIGNIFICANT CHANGE UP (ref 10.3–14.5)
SODIUM SERPL-SCNC: 141 MMOL/L — SIGNIFICANT CHANGE UP (ref 135–145)
WBC # BLD: 6 K/UL — SIGNIFICANT CHANGE UP (ref 3.8–10.5)
WBC # FLD AUTO: 6 K/UL — SIGNIFICANT CHANGE UP (ref 3.8–10.5)

## 2024-09-02 PROCEDURE — 99232 SBSQ HOSP IP/OBS MODERATE 35: CPT

## 2024-09-02 RX ADMIN — Medication 5000 UNIT(S): at 14:33

## 2024-09-02 RX ADMIN — Medication 5000 UNIT(S): at 06:06

## 2024-09-02 RX ADMIN — METOPROLOL TARTRATE 100 MILLIGRAM(S): 100 TABLET ORAL at 06:04

## 2024-09-02 RX ADMIN — PIPERACILLIN SODIUM AND TAZOBACTAM SODIUM 25 GRAM(S): 3; .375 INJECTION, POWDER, FOR SOLUTION INTRAVENOUS at 05:57

## 2024-09-02 RX ADMIN — Medication 1 TABLET(S): at 11:55

## 2024-09-02 RX ADMIN — DILTIAZEM HYDROCHLORIDE 240 MILLIGRAM(S): 5 INJECTION INTRAVENOUS at 06:03

## 2024-09-02 RX ADMIN — PIPERACILLIN SODIUM AND TAZOBACTAM SODIUM 25 GRAM(S): 3; .375 INJECTION, POWDER, FOR SOLUTION INTRAVENOUS at 21:22

## 2024-09-02 RX ADMIN — TAMSULOSIN HYDROCHLORIDE 0.4 MILLIGRAM(S): 0.4 CAPSULE ORAL at 21:18

## 2024-09-02 RX ADMIN — Medication 50000 UNIT(S): at 11:55

## 2024-09-02 RX ADMIN — Medication 1 MILLIGRAM(S): at 21:18

## 2024-09-02 RX ADMIN — Medication 10 GRAM(S): at 11:54

## 2024-09-02 RX ADMIN — Medication 5000 UNIT(S): at 21:22

## 2024-09-02 RX ADMIN — PIPERACILLIN SODIUM AND TAZOBACTAM SODIUM 25 GRAM(S): 3; .375 INJECTION, POWDER, FOR SOLUTION INTRAVENOUS at 14:33

## 2024-09-02 RX ADMIN — BICTEGRAVIR SODIUM, EMTRICITABINE, AND TENOFOVIR ALAFENAMIDE FUMARATE 1 TABLET(S): 50; 200; 25 TABLET ORAL at 11:55

## 2024-09-02 RX ADMIN — EZETIMIBE 10 MILLIGRAM(S): 10 TABLET ORAL at 11:58

## 2024-09-03 ENCOUNTER — APPOINTMENT (OUTPATIENT)
Dept: INTERNAL MEDICINE | Facility: CLINIC | Age: 59
End: 2024-09-03

## 2024-09-03 PROCEDURE — 74176 CT ABD & PELVIS W/O CONTRAST: CPT | Mod: 26

## 2024-09-03 PROCEDURE — 99232 SBSQ HOSP IP/OBS MODERATE 35: CPT

## 2024-09-03 PROCEDURE — 76937 US GUIDE VASCULAR ACCESS: CPT | Mod: 26

## 2024-09-03 PROCEDURE — 36000 PLACE NEEDLE IN VEIN: CPT

## 2024-09-03 RX ADMIN — Medication 5000 UNIT(S): at 13:34

## 2024-09-03 RX ADMIN — BICTEGRAVIR SODIUM, EMTRICITABINE, AND TENOFOVIR ALAFENAMIDE FUMARATE 1 TABLET(S): 50; 200; 25 TABLET ORAL at 13:28

## 2024-09-03 RX ADMIN — Medication 5000 UNIT(S): at 21:22

## 2024-09-03 RX ADMIN — EZETIMIBE 10 MILLIGRAM(S): 10 TABLET ORAL at 13:33

## 2024-09-03 RX ADMIN — Medication 3 MILLIGRAM(S): at 21:22

## 2024-09-03 RX ADMIN — PIPERACILLIN SODIUM AND TAZOBACTAM SODIUM 25 GRAM(S): 3; .375 INJECTION, POWDER, FOR SOLUTION INTRAVENOUS at 13:47

## 2024-09-03 RX ADMIN — DILTIAZEM HYDROCHLORIDE 240 MILLIGRAM(S): 5 INJECTION INTRAVENOUS at 05:13

## 2024-09-03 RX ADMIN — METOPROLOL TARTRATE 100 MILLIGRAM(S): 100 TABLET ORAL at 05:13

## 2024-09-03 RX ADMIN — ZOLPIDEM TARTRATE 5 MILLIGRAM(S): 5 TABLET, FILM COATED ORAL at 21:24

## 2024-09-03 RX ADMIN — PIPERACILLIN SODIUM AND TAZOBACTAM SODIUM 25 GRAM(S): 3; .375 INJECTION, POWDER, FOR SOLUTION INTRAVENOUS at 21:22

## 2024-09-03 RX ADMIN — Medication 5000 UNIT(S): at 05:20

## 2024-09-03 RX ADMIN — PIPERACILLIN SODIUM AND TAZOBACTAM SODIUM 25 GRAM(S): 3; .375 INJECTION, POWDER, FOR SOLUTION INTRAVENOUS at 05:13

## 2024-09-03 NOTE — PROGRESS NOTE ADULT - PROBLEM SELECTOR PLAN 8
- Bowel regimen with senna, laculose  - f/u AXR
- Bowel regimen with senna, laculose  - f/u AXR
- Bowel regimen with senna, laculose

## 2024-09-03 NOTE — ED PROVIDER NOTE - NSICDXPASTMEDICALHX_GEN_ALL_CORE_FT
No fractures on x-ray.  Continue giving ibuprofen or Tylenol as needed for pain.  May try and apply some ice packs to her thumb which should help with the swelling.    Follow-up with your doctor as needed.  Return to urgent care emergency room for any worsening or concerning symptoms.   PAST MEDICAL HISTORY:  Anxiety     BPH (benign prostatic hyperplasia)     History of anal cancer     HIV infection     HLD (hyperlipidemia)     HTN (hypertension)

## 2024-09-03 NOTE — PROGRESS NOTE ADULT - PROBLEM SELECTOR PLAN 6
- s/p radiotherapy which led to ureteral stricture s/p b/l nephrostomy tubes  - resume tamsulosin

## 2024-09-03 NOTE — PROGRESS NOTE ADULT - PROBLEM SELECTOR PLAN 1
-+ proximal hydroureter  - renal u/s reviewed as above  - urology consulted: monitor renal function; pt to be reassessed in am   - stable renal function, no fevers, rigors  - UTI, f/u urine culture. hx of recurring pseudomonas UTI  - start zosyn empirically  f/u with IR as left nephrostomy as minimal output

## 2024-09-03 NOTE — PROGRESS NOTE ADULT - PROBLEM SELECTOR PLAN 5
- s/p chemo, radio therapy, surgery, colostomy 03/2022

## 2024-09-03 NOTE — PROGRESS NOTE ADULT - PROBLEM SELECTOR PLAN 3
Resume home meds: ? metoprolol and diltiazem (confirmed with patient)

## 2024-09-04 ENCOUNTER — TRANSCRIPTION ENCOUNTER (OUTPATIENT)
Age: 59
End: 2024-09-04

## 2024-09-04 VITALS
RESPIRATION RATE: 16 BRPM | HEART RATE: 111 BPM | OXYGEN SATURATION: 98 % | TEMPERATURE: 99 F | DIASTOLIC BLOOD PRESSURE: 90 MMHG | SYSTOLIC BLOOD PRESSURE: 144 MMHG

## 2024-09-04 LAB
-  AMPICILLIN/SULBACTAM: SIGNIFICANT CHANGE UP
-  AMPICILLIN: SIGNIFICANT CHANGE UP
-  AZTREONAM: SIGNIFICANT CHANGE UP
-  CEFAZOLIN: SIGNIFICANT CHANGE UP
-  CEFEPIME: SIGNIFICANT CHANGE UP
-  CEFTRIAXONE: SIGNIFICANT CHANGE UP
-  CEFUROXIME: SIGNIFICANT CHANGE UP
-  CIPROFLOXACIN: SIGNIFICANT CHANGE UP
-  ERTAPENEM: SIGNIFICANT CHANGE UP
-  GENTAMICIN: SIGNIFICANT CHANGE UP
-  IMIPENEM: SIGNIFICANT CHANGE UP
-  LEVOFLOXACIN: SIGNIFICANT CHANGE UP
-  MEROPENEM: SIGNIFICANT CHANGE UP
-  NITROFURANTOIN: SIGNIFICANT CHANGE UP
-  PIPERACILLIN/TAZOBACTAM: SIGNIFICANT CHANGE UP
-  TOBRAMYCIN: SIGNIFICANT CHANGE UP
-  TRIMETHOPRIM/SULFAMETHOXAZOLE: SIGNIFICANT CHANGE UP
ALBUMIN SERPL ELPH-MCNC: 3.7 G/DL — SIGNIFICANT CHANGE UP (ref 3.3–5)
ALP SERPL-CCNC: 112 U/L — SIGNIFICANT CHANGE UP (ref 40–120)
ALT FLD-CCNC: 25 U/L — SIGNIFICANT CHANGE UP (ref 12–78)
ANION GAP SERPL CALC-SCNC: 5 MMOL/L — SIGNIFICANT CHANGE UP (ref 5–17)
AST SERPL-CCNC: 14 U/L — LOW (ref 15–37)
BILIRUB SERPL-MCNC: 0.5 MG/DL — SIGNIFICANT CHANGE UP (ref 0.2–1.2)
BUN SERPL-MCNC: 15 MG/DL — SIGNIFICANT CHANGE UP (ref 7–23)
CALCIUM SERPL-MCNC: 9.8 MG/DL — SIGNIFICANT CHANGE UP (ref 8.5–10.1)
CHLORIDE SERPL-SCNC: 107 MMOL/L — SIGNIFICANT CHANGE UP (ref 96–108)
CO2 SERPL-SCNC: 28 MMOL/L — SIGNIFICANT CHANGE UP (ref 22–31)
CREAT SERPL-MCNC: 0.95 MG/DL — SIGNIFICANT CHANGE UP (ref 0.5–1.3)
CULTURE RESULTS: SIGNIFICANT CHANGE UP
EGFR: 93 ML/MIN/1.73M2 — SIGNIFICANT CHANGE UP
GLUCOSE SERPL-MCNC: 97 MG/DL — SIGNIFICANT CHANGE UP (ref 70–99)
HCT VFR BLD CALC: 41 % — SIGNIFICANT CHANGE UP (ref 39–50)
HGB BLD-MCNC: 13.7 G/DL — SIGNIFICANT CHANGE UP (ref 13–17)
MCHC RBC-ENTMCNC: 29.4 PG — SIGNIFICANT CHANGE UP (ref 27–34)
MCHC RBC-ENTMCNC: 33.4 G/DL — SIGNIFICANT CHANGE UP (ref 32–36)
MCV RBC AUTO: 88 FL — SIGNIFICANT CHANGE UP (ref 80–100)
METHOD TYPE: SIGNIFICANT CHANGE UP
NRBC # BLD: 0 /100 WBCS — SIGNIFICANT CHANGE UP (ref 0–0)
ORGANISM # SPEC MICROSCOPIC CNT: SIGNIFICANT CHANGE UP
PLATELET # BLD AUTO: 281 K/UL — SIGNIFICANT CHANGE UP (ref 150–400)
POTASSIUM SERPL-MCNC: 4.5 MMOL/L — SIGNIFICANT CHANGE UP (ref 3.5–5.3)
POTASSIUM SERPL-SCNC: 4.5 MMOL/L — SIGNIFICANT CHANGE UP (ref 3.5–5.3)
PROT SERPL-MCNC: 8.1 GM/DL — SIGNIFICANT CHANGE UP (ref 6–8.3)
RBC # BLD: 4.66 M/UL — SIGNIFICANT CHANGE UP (ref 4.2–5.8)
RBC # FLD: 14.3 % — SIGNIFICANT CHANGE UP (ref 10.3–14.5)
SODIUM SERPL-SCNC: 140 MMOL/L — SIGNIFICANT CHANGE UP (ref 135–145)
SPECIMEN SOURCE: SIGNIFICANT CHANGE UP
WBC # BLD: 5.47 K/UL — SIGNIFICANT CHANGE UP (ref 3.8–10.5)
WBC # FLD AUTO: 5.47 K/UL — SIGNIFICANT CHANGE UP (ref 3.8–10.5)

## 2024-09-04 PROCEDURE — 50435 EXCHANGE NEPHROSTOMY CATH: CPT | Mod: 50

## 2024-09-04 PROCEDURE — 99232 SBSQ HOSP IP/OBS MODERATE 35: CPT

## 2024-09-04 PROCEDURE — 99239 HOSP IP/OBS DSCHRG MGMT >30: CPT

## 2024-09-04 RX ORDER — EZETIMIBE 10 MG/1
1 TABLET ORAL
Qty: 0 | Refills: 0 | DISCHARGE
Start: 2024-09-04

## 2024-09-04 RX ORDER — BICTEGRAVIR SODIUM, EMTRICITABINE, AND TENOFOVIR ALAFENAMIDE FUMARATE 50; 200; 25 MG/1; MG/1; MG/1
1 TABLET ORAL
Qty: 0 | Refills: 0 | DISCHARGE
Start: 2024-09-04

## 2024-09-04 RX ORDER — TAMSULOSIN HYDROCHLORIDE 0.4 MG/1
1 CAPSULE ORAL
Qty: 0 | Refills: 0 | DISCHARGE
Start: 2024-09-04

## 2024-09-04 RX ORDER — ALPRAZOLAM 0.25 MG
1 TABLET ORAL
Qty: 0 | Refills: 0 | DISCHARGE
Start: 2024-09-04

## 2024-09-04 RX ORDER — LACTULOSE 10 G
15 PACKET (EA) ORAL
Qty: 0 | Refills: 0 | DISCHARGE
Start: 2024-09-04

## 2024-09-04 RX ORDER — SENNA 187 MG
1 TABLET ORAL
Qty: 0 | Refills: 0 | DISCHARGE
Start: 2024-09-04

## 2024-09-04 RX ORDER — DILTIAZEM HYDROCHLORIDE 5 MG/ML
1 INJECTION INTRAVENOUS
Qty: 0 | Refills: 0 | DISCHARGE
Start: 2024-09-04

## 2024-09-04 RX ORDER — ACETAMINOPHEN 325 MG/1
2 TABLET ORAL
Qty: 0 | Refills: 0 | DISCHARGE
Start: 2024-09-04

## 2024-09-04 RX ORDER — METOPROLOL TARTRATE 100 MG/1
1 TABLET ORAL
Qty: 0 | Refills: 0 | DISCHARGE
Start: 2024-09-04

## 2024-09-04 RX ADMIN — BICTEGRAVIR SODIUM, EMTRICITABINE, AND TENOFOVIR ALAFENAMIDE FUMARATE 1 TABLET(S): 50; 200; 25 TABLET ORAL at 14:33

## 2024-09-04 RX ADMIN — PIPERACILLIN SODIUM AND TAZOBACTAM SODIUM 25 GRAM(S): 3; .375 INJECTION, POWDER, FOR SOLUTION INTRAVENOUS at 05:28

## 2024-09-04 RX ADMIN — DILTIAZEM HYDROCHLORIDE 240 MILLIGRAM(S): 5 INJECTION INTRAVENOUS at 05:28

## 2024-09-04 NOTE — PROGRESS NOTE ADULT - ASSESSMENT
58M with h/o prostate CA treated with Radiation, subsequently had ureteral strictures requiring stents, with h/o b/l hydronephrosis despite indwelling metal stents as well as recurrent pseudomonas UTI, with indwelling bilateral nephrostomy tubes inserted 2/26/24. most recent IR b/l PCN exchange was 7/19.  Presently seen with dislodged left PCN (remains intact without output; US Renal 8/31 reveals Moderate left hydronephrosis and proximal   hydroureter. Portion of nephrostomy tube seen,although not well visualized.)    L PCN / tube check by IR Today  no acute surgical intervention at this time  monitor renal function. c/w abx  medical management per primary team
58M with h/o prostate CA treated with Radiation, subsequently had ureteral strictures requiring stents, with h/o b/l hydronephrosis despite indwelling metal stents as well as recurrent pseudomonas UTI, with indwelling bilateral nephrostomy tubes inserted 2/26/24. most recent IR b/l PCN exchange was 7/19.  Presently seen with dislodged left PCN (remains intact without output; US Renal 8/31 reveals Moderate left hydronephrosis and proximal   hydroureter. Portion of nephrostomy tube seen,although not well visualized.)    L PCN / tube check by IR Wednesday  no acute surgical intervention at this time  monitor renal function. c/w abx  medical management per primary team
58 years old male with h/o HTN, HLD, HIV on HAART ( CD 4 608, VL undetectable on 2/12/24), h/o jared Ca (s/p chemo, radio therapy, surgery, colostomy 03/2022), prostate Ca s/p radiotherapy, ureteral stricture s/p stent and b/l nephrostomy tubes, multiple admissions with UTI ( urine culture with pseudomonas) presented  to ED with complain of L marvin pain and l nephrostomy tube not drainng as usual. Pt found with UTI and L moderate hydronephrosis. Urology recommends medicine admission for Cr monitoring pending definitive plan.

## 2024-09-04 NOTE — DISCHARGE NOTE PROVIDER - NSDCCPCAREPLAN_GEN_ALL_CORE_FT
PRINCIPAL DISCHARGE DIAGNOSIS  Diagnosis: Malfunction of nephrostomy tube  Assessment and Plan of Treatment:       SECONDARY DISCHARGE DIAGNOSES  Diagnosis: Hydronephrosis, left  Assessment and Plan of Treatment:     Diagnosis: Acute UTI  Assessment and Plan of Treatment:     Diagnosis: HIV infection  Assessment and Plan of Treatment:     Diagnosis: History of anal cancer  Assessment and Plan of Treatment:     Diagnosis: H/O prostate cancer  Assessment and Plan of Treatment:     Diagnosis: Constipation  Assessment and Plan of Treatment:

## 2024-09-04 NOTE — PROGRESS NOTE ADULT - REASON FOR ADMISSION
Moderate L hydronephrosis

## 2024-09-04 NOTE — DISCHARGE NOTE PROVIDER - NSDCCPTREATMENT_GEN_ALL_CORE_FT
PRINCIPAL PROCEDURE  Procedure: Replacement, nephrostomy tube, bilateral  Findings and Treatment: You had bilateral nephrostomy tubes replaced by Dr. Brand on 9/4/24 in Interventional Radiology (IR).   Monitor site for any sign or symptoms of infection (painful red skin, green/ yellow foul smelling discharge from the insertion site, fever, chills, leakage around drain site).   Flush drain with 10mL normal saline daily. If you meet resistance upon flushing, STOP and contact IR.   Empty drainage bag or bulb daily and record output. Once output is <10mL in a 24hr period or if there is a sudden decrease in output please contact IR to schedule  an appointment.  Drain care:  -Disconnect tubing (tube attached to bag/ bulb)  from the catheter (catheter going into skin)  -Clean catheter with alcohol swab  -Twist on the flush syringe to the catheter (be sure to push the air out of syringe)  -Flush catheter with 10mL normal saline (DO NOT pull back on syringe). If you meet resistance upon flushing, STOP and contact IR.   -Disconnect syringe from catheter and reconnect drainage bag or bulb.  Dressing care:  -Wash hands thoroughly with water and soap for at least 20 seconds.   -take off old dressing and clean around drain site with gauze soaked with warm water  -After cleaning the site, dry and replace dressing with a new gauze and tegaderm.   -Place one piece of gauze underneath the drain and another piece of gauze on top of drain.   -Apply tegaderm (clear dressing) on top.  -Change dressing every 3 days or when soiled  Follow up every 2-3 months for routine exchange. Call to make appt at [phone].       rt calf pain/PAIN

## 2024-09-04 NOTE — CONSULT NOTE ADULT - SUBJECTIVE AND OBJECTIVE BOX
Interventional Radiology    Evaluate for Procedure: Left nephrostomy tube replacement    HPI: 59y Male with h/o prostate CA treated with Radiation, subsequently had ureteral strictures requiring stents, with h/o b/l hydronephrosis despite indwelling metal stents as well as recurrent pseudomonas UTI, with indwelling bilateral nephrostomy tubes inserted 2/26/24. most recent IR b/l PCN exchange was 7/19. Presently seen with difficulty flushing left PNC at home, suspected dislodged. Tube remains intact without output. CT abd/pelv shows Left-sided nephrostomy tube with pigtail in the periphery of the mid polar calyx and moderate left hydroureter and hydronephrosis. Correct position in the right nephrostomy tube without hydronephrosis. IR consulted for left nephrostomy tube replacement.       Allergies: No Known Allergies    Medications (Abx/Cardiac/Anticoagulation/Blood Products)    bictegravir 50 mG/emtricitabine 200 mG/tenofovir alafenamide 25 mG (BIKTARVY): 1 Tablet(s) Oral (09-03 @ 13:28)  diltiazem   CD: 240 milliGRAM(s) Oral (09-04 @ 05:28)  heparin   Injectable: 5000 Unit(s) SubCutaneous (09-03 @ 21:22)  metoprolol succinate ER: 100 milliGRAM(s) Oral (09-03 @ 05:13)  piperacillin/tazobactam IVPB..: 25 mL/Hr IV Intermittent (09-04 @ 05:28)    Data:    T(C): 36.1  HR: 54  BP: 131/75  RR: 18  SpO2: 99%    -WBC 6.00 / HgB 12.5 / Hct 37.9 / Plt 256  -Na 141 / Cl 108 / BUN 16 / Glucose 101  -K 3.8 / CO2 28 / Cr 0.99  -ALT -- / Alk Phos -- / T.Bili --  -INR 0.96 / PTT 34.6    Radiology: Reviewed    Assessment/Plan:   -59y Male with h/o prostate CA treated with Radiation, subsequently had ureteral strictures requiring stents, with h/o b/l hydronephrosis despite indwelling metal stents as well as recurrent pseudomonas UTI, with indwelling bilateral nephrostomy tubes inserted 2/26/24. most recent IR b/l PCN exchange was 7/19. Presently seen with difficulty flushing left PNC at home, suspected dislodged. Tube remains intact without output. CT abd/pelv shows Left-sided nephrostomy tube with pigtail in the periphery of the mid polar calyx and moderate left hydroureter and hydronephrosis. Correct position in the right nephrostomy tube without hydronephrosis. IR consulted for left nephrostomy tube replacement.       - case and imaging reviewed with Dr Brand  - Plan for bilateral nephrostomy tube exchange today  - IR procedure ordered  - Labs reviewed, Cr stable  - hold heparin subq today  - does not need to be NPO  - d/w primary team      Interventional Radiology  ------------------------------------  For emergent consults, please call [phone], or call or message on TEAMs.   For non-emergent consults, consults after hours or over the weekend, please place IR Consult order.

## 2024-09-04 NOTE — DISCHARGE NOTE NURSING/CASE MANAGEMENT/SOCIAL WORK - PATIENT PORTAL LINK FT
You can access the FollowMyHealth Patient Portal offered by [facility] by registering at the following website: [email or web address]. By joining [employer]’s FollowMyHealth portal, you will also be able to view your health information using other applications (apps) compatible with our system.

## 2024-09-04 NOTE — DISCHARGE NOTE PROVIDER - CARE PROVIDER_API CALL
Adonis Layne  Cardiovascular Disease  [address] [place] [address]  Phone: [phone]  Fax: [phone]  Scheduled Appointment: 10/01/2024    Jarad Schwartz (DO)  Family Medicine  [address] [place] [address], [address]  [place] [address]  Phone: [phone]  Fax: [phone]  Scheduled Appointment: 10/07/2024    Noreen Sapp [facility]  Podiatric Medicine  [address] [place] [id number]  Phone: [phone]  Fax: [phone]  Scheduled Appointment: 11/15/2024

## 2024-09-04 NOTE — DISCHARGE NOTE PROVIDER - NSDCMRMEDTOKEN_GEN_ALL_CORE_FT
acetaminophen 325 mg oral tablet: 2 tab(s) orally every 6 hours As needed Temp greater or equal to 38C (100.4F), Mild Pain (1 - 3)  ALPRAZolam 1 mg oral tablet: 1 tab(s) orally every 6 hours As needed for anxiety  Ambien 10 mg oral tablet: 1 tab(s) orally once a day (at bedtime)  bictegravir/emtricitabine/tenofovir 50 mg-200 mg-25 mg oral tablet: 1 tab(s) orally once a day  dilTIAZem 240 mg/24 hours oral capsule, extended release: 1 cap(s) orally once a day  ezetimibe 10 mg oral tablet: 1 tab(s) orally once a day  lactulose 10 g/15 mL oral syrup: 15 milliliter(s) orally once a day  metoprolol succinate 100 mg oral tablet, extended release: 1 tab(s) orally once a day  omeprazole 40 mg oral delayed release capsule: 1 cap(s) orally once a day  senna leaf extract oral tablet: 1 tab(s) orally once a day  tamsulosin 0.4 mg oral capsule: 1 cap(s) orally once a day (at bedtime)

## 2024-09-04 NOTE — DISCHARGE NOTE PROVIDER - PROVIDER TOKENS
PROVIDER:[TOKEN:[[id number]],SCHEDULEDAPPT:[10/01/2024]],PROVIDER:[TOKEN:[[id number]],SCHEDULEDAPPT:[10/07/2024]],PROVIDER:[TOKEN:[[id number]],SCHEDULEDAPPT:[11/15/2024]]

## 2024-09-04 NOTE — DISCHARGE NOTE PROVIDER - HOSPITAL COURSE
58 year old male with h/o HTN, HLD, HIV on HAART (CD 4 608, VL undetectable on 2/12/24), h/o anal Ca (s/p chemo, radio therapy, surgery, colostomy 03/2022), prostate Ca s/p radiotherapy, ureteral stricture s/p stent and b/l nephrostomy tubes, multiple admissions with UTI (urine culture with pseudomonas) presented to ED with complain of L marvin pain and L nephrostomy tube not draining as usual. Pt found with UTI and L moderate hydronephrosis. Urology recommends medicine admission for Cr monitoring pending definitive plan.      Problem/Plan - 1: Hydronephrosis, left.   - Stable renal function, no fevers, rigors  - Most recent IR b/l PCN exchange was 7/19. Presently seen with difficulty flushing left PCN at home, suspected dislodged. Tube remains intact without output. CT abd/pelv shows Left-sided nephrostomy tube with pigtail in the periphery of the mid polar calyx and moderate left hydroureter and hydronephrosis. Correct position in the right nephrostomy tube without hydronephrosis.  - Replaced L nephrostomy tube 9/4/24    Problem/Plan - 2: Acute UTI.   - S/p Zosyn    Problem/Plan - 3: HTN (hypertension).   - Resume home meds: Metoprolol and Diltiazem (confirmed with patient)    Problem/Plan - 4: HIV infection.   - Resume Biktarvy    Problem/Plan - 5: History of anal cancer.   - S/p chemo, radio therapy, surgery, colostomy 03/2022    Problem/Plan - 6: H/O prostate cancer.   - S/p radiotherapy which led to ureteral strictures s/p b/l nephrostomy tubes  - Resume Tamsulosin    Problem/Plan - 7: Axillary lump, left.   - Neg ultrasound    Problem/Plan - 8: Constipation.   - Bowel regimen with Senna and Lactulose    Pt is medically cleared for discharge, plan discussed with Dr. Gibbs, agrees with plan.

## 2024-09-04 NOTE — PROCEDURE NOTE - PROCEDURE FINDINGS AND DETAILS
Fluoroscopy guided replacement of left 8.5F MPDC PCN and exchange of right 8.5F MPDC PCN. 12cc contrast administered. No complications. Full report to follow.

## 2024-09-04 NOTE — DISCHARGE NOTE PROVIDER - NSDCFUSCHEDAPPT_GEN_ALL_CORE_FT
Adonis Layne  [facility] Physician [facility]  CARDIOLOGY [address]  Scheduled Appointment: 10/01/2024    Jarad Schwartz  [facility] Physician [facility]  INTMED [id number]  Scheduled Appointment: 10/07/2024    Noreen Sapp  [facility] Physician [facility]  PODIATRY [address] [place] [address]  Scheduled Appointment: 11/15/2024

## 2024-09-04 NOTE — DISCHARGE NOTE PROVIDER - CARE PROVIDERS DIRECT ADDRESSES
[email or web address]

## 2024-09-04 NOTE — PROGRESS NOTE ADULT - SUBJECTIVE AND OBJECTIVE BOX
Patient seen and examined bedside resting comfortably.  Denies nausea and vomiting. Tolerating diet.  Denies chest pain, dyspnea, cough.    T(F): 98.4 (09-03-24 @ 05:59), Max: 98.9 (09-02-24 @ 17:02)  HR: 75 (09-03-24 @ 05:59) (75 - 89)  BP: 115/75 (09-03-24 @ 05:59) (115/75 - 142/93)  RR: 18 (09-03-24 @ 05:59) (18 - 18)  SpO2: 98% (09-03-24 @ 05:59) (97% - 98%)  Wt(kg): --  CAPILLARY BLOOD GLUCOSE      PHYSICAL EXAM:  General: NAD, alert and awake  HEENT: NCAT, EOMI, conjunctiva clear  Chest: nonlabored respirations, good inspiratory effort  Abdomen: soft, NTND.   Extremities: no pedal edema or calf tenderness noted   : No CVA or SP tenderness. right nephrostomy draining well. left blocked    LABS:                        12.5   6.00  )-----------( 256      ( 02 Sep 2024 06:22 )             37.9   09-02    141  |  108  |  16  ----------------------------<  101<H>  3.8   |  28  |  0.99    Ca    9.3      02 Sep 2024 06:22      I&O's Detail    02 Sep 2024 07:01  -  03 Sep 2024 07:00  --------------------------------------------------------  IN:    IV PiggyBack: 200 mL  Total IN: 200 mL    OUT:    Nephrostomy Tube (mL): 550 mL    Nephrostomy Tube (mL): 0 mL    Voided (mL): 400 mL  Total OUT: 950 mL    Total NET: -750 mL          Impression: 59y Male admitted with  PMH   DM (diabetes mellitus)    HTN (hypertension)    HLD (hyperlipidemia)    HIV infection    Depressive disorder    Anxiety    Prostate cancer    Anal cancer    Diverticulosis    Lung cancer    Unspecified abdominal pain    Abdominal pain    BPH (benign prostatic hyperplasia)    Insomnia    History of anal cancer        Plan:    
Patient seen and examined bedside resting comfortably.  No complaints offered.   Feels well overall.  Voiding     T(F): 98.1 (09-02-24 @ 11:47), Max: 99.5 (09-01-24 @ 17:22)  HR: 89 (09-02-24 @ 11:47) (83 - 97)  BP: 142/93 (09-02-24 @ 11:47) (109/73 - 142/93)  RR: 18 (09-02-24 @ 11:47) (17 - 18)  SpO2: 98% (09-02-24 @ 11:47) (95% - 98%)      PHYSICAL EXAM:  General: NAD, alert and awake  HEENT: NCAT, EOMI, conjunctiva clear  Chest: nonlabored respirations, good inspiratory effort  Abdomen: soft, NTND.   Extremities: no pedal edema or calf tenderness noted   : b/l PCNs, Left side without output, unable to flush. R output 1720cc/24h. Light yellow urine in bedside urinals.    LABS:                        12.5   6.00  )-----------( 256      ( 02 Sep 2024 06:22 )             37.9   09-02    141  |  108  |  16  ----------------------------<  101<H>  3.8   |  28  |  0.99    Ca    9.3      02 Sep 2024 06:22    TPro  7.6  /  Alb  3.5  /  TBili  0.8  /  DBili  x   /  AST  8<L>  /  ALT  20  /  AlkPhos  113  09-01    I&O's Detail    01 Sep 2024 07:01  -  02 Sep 2024 07:00  --------------------------------------------------------  IN:  Total IN: 0 mL    OUT:    Nephrostomy Tube (mL): 0 mL    Nephrostomy Tube (mL): 1720 mL    Voided (mL): 450 mL  Total OUT: 2170 mL    Total NET: -2170 mL        A/P: 58M with h/o prostate CA treated with Radiation, subsequently had ureteral strictures requiring stents, with h/o b/l hydronephrosis despite indwelling metal stents as well as recurrent pseudomonas UTI, with indwelling bilateral nephrostomy tubes inserted 2/26/24. most recent IR b/l PCN exchange was 7/19.  Presently seen with dislodged left PCN (remains intact without output; US Renal 8/31 reveals Moderate left hydronephrosis and proximal   hydroureter. Portion of nephrostomy tube seen,although not well visualized.)  as discussed with Dr Mathew, recommend replace L PCN by IR   no acute surgical intervention at this time  monitor renal function. c/w abx  medical management per primary team
Patient seen and examined bedside resting comfortably.  Denies nausea and vomiting. Tolerating diet.  Denies chest pain, dyspnea, cough.    T(F): 97 (09-04-24 @ 05:00), Max: 98.9 (09-03-24 @ 11:12)  HR: 54 (09-04-24 @ 05:00) (54 - 89)  BP: 131/75 (09-04-24 @ 05:00) (120/79 - 143/95)  RR: 18 (09-04-24 @ 05:00) (17 - 18)  SpO2: 99% (09-04-24 @ 05:00) (96% - 99%)  Wt(kg): --  CAPILLARY BLOOD GLUCOSE          PHYSICAL EXAM:  General: NAD, alert and awake  HEENT: NCAT, EOMI, conjunctiva clear  Chest: nonlabored respirations, good inspiratory effort  Abdomen: soft, NTND. Colostomy with Stool in bag  Extremities: no pedal edema or calf tenderness noted   : No CVA or SP tenderness. right nephrostomy draining well. left blocked     LABS:      I&O's Detail      
Patient is a 59y old  Male who presents with a chief complaint of Moderate L hydronephrosis (31 Aug 2024 21:51)      INTERVAL HPI/OVERNIGHT EVENTS: none     MEDICATIONS  (STANDING):  bictegravir 50 mG/emtricitabine 200 mG/tenofovir alafenamide 25 mG (BIKTARVY) 1 Tablet(s) Oral daily  diltiazem    milliGRAM(s) Oral daily  ergocalciferol 32425 Unit(s) Oral every week  ezetimibe 10 milliGRAM(s) Oral daily  heparin   Injectable 5000 Unit(s) SubCutaneous every 8 hours  lactulose Syrup 10 Gram(s) Oral daily  metoprolol succinate  milliGRAM(s) Oral daily  piperacillin/tazobactam IVPB.. 3.375 Gram(s) IV Intermittent every 8 hours  senna 1 Tablet(s) Oral daily  tamsulosin 0.4 milliGRAM(s) Oral at bedtime    MEDICATIONS  (PRN):  acetaminophen     Tablet .. 650 milliGRAM(s) Oral every 6 hours PRN Temp greater or equal to 38C (100.4F), Mild Pain (1 - 3)  ALPRAZolam 1 milliGRAM(s) Oral every 6 hours PRN for anxiety  aluminum hydroxide/magnesium hydroxide/simethicone Suspension 30 milliLiter(s) Oral every 4 hours PRN Dyspepsia  melatonin 3 milliGRAM(s) Oral at bedtime PRN Insomnia  ondansetron Injectable 4 milliGRAM(s) IV Push every 8 hours PRN Nausea and/or Vomiting  zolpidem 5 milliGRAM(s) Oral at bedtime PRN Insomnia  zolpidem 5 milliGRAM(s) Oral at bedtime PRN Insomnia    Allergies    No Known Allergies    Intolerances        REVIEW OF SYSTEMS:  CONSTITUTIONAL: No fever, weight loss  EYES: No eye pain, visual disturbances, or discharge  ENMT:  No difficulty hearing, tinnitus, vertigo; No sinus or throat pain  RESPIRATORY: No cough, wheezing, chills or hemoptysis; No shortness of breath  CARDIOVASCULAR: No chest pain, palpitations, dizziness, or leg swelling  GASTROINTESTINAL: No abdominal or epigastric pain. No nausea, vomiting, or hematemesis; No diarrhea or constipation. No melena or hematochezia.  GENITOURINARY: No dysuria, frequency, hematuria, or incontinence  NEUROLOGICAL: No headaches, memory loss, loss of strength, numbness, or tremors  SKIN: No itching, burning, rashes, or lesions   MUSCULOSKELETAL: No joint pain or swelling; No muscle, back, or extremity pain  PSYCHIATRIC: No depression, anxiety, mood swings, or difficulty sleeping  HEME/LYMPH: No easy bruising, or bleeding gums      Vital Signs Last 24 Hrs  T(C): 37.5 (01 Sep 2024 11:13), Max: 37.5 (01 Sep 2024 11:13)  T(F): 99.5 (01 Sep 2024 11:13), Max: 99.5 (01 Sep 2024 11:13)  HR: 84 (01 Sep 2024 11:13) (70 - 87)  BP: 122/84 (01 Sep 2024 11:13) (115/74 - 150/81)  BP(mean): 88 (31 Aug 2024 22:25) (88 - 88)  RR: 18 (01 Sep 2024 11:13) (18 - 18)  SpO2: 96% (01 Sep 2024 11:13) (95% - 99%)    Parameters below as of 01 Sep 2024 11:13  Patient On (Oxygen Delivery Method): room air        PHYSICAL EXAM:  GENERAL: NAD  HEAD:  Atraumatic, Normocephalic  EYES: EOMI, PERRLA, conjunctiva and sclera clear  ENMT: No tonsillar erythema, exudates, or enlargement;   NECK: Supple, Normal thyroid  NERVOUS SYSTEM:  Alert & Oriented X3, Good concentration; Motor Strength 5/5 B/L upper and lower extremities; DTRs 2+ intact and symmetric  CHEST/LUNG: CTABL; No rales, rhonchi, wheezing, or rubs  HEART: Regular rate and rhythm; No murmurs, rubs, or gallops  ABDOMEN: Soft, Nontender, Nondistended; Bowel sounds present  EXTREMITIES:  2+ Peripheral Pulses, No clubbing, cyanosis, or edema  LYMPH: No lymphadenopathy noted  SKIN: No rashes or lesions    LABS:                                               13.1   10.21 )-----------( 260      ( 01 Sep 2024 06:10 )             39.0     09-01    139  |  108  |  13  ----------------------------<  112<H>  3.5   |  25  |  0.90    Ca    9.3      01 Sep 2024 06:10    TPro  7.6  /  Alb  3.5  /  TBili  0.8  /  DBili  x   /  AST  8<L>  /  ALT  20  /  AlkPhos  113  09-01      Urinalysis Basic - ( 01 Sep 2024 06:10 )    Color: x / Appearance: x / SG: x / pH: x  Gluc: 112 mg/dL / Ketone: x  / Bili: x / Urobili: x   Blood: x / Protein: x / Nitrite: x   Leuk Esterase: x / RBC: x / WBC x   Sq Epi: x / Non Sq Epi: x / Bacteria: x          RADIOLOGY & ADDITIONAL TESTS:    Imaging Personally Reviewed:  [ ] YES  [ ] NO    Consultant(s) Notes Reviewed:  [ ] YES  [ ] NO    Care Discussed with Consultants/Other Providers [ ] YES  [ ] NO
Patient is a 59y old  Male who presents with a chief complaint of Moderate L hydronephrosis (31 Aug 2024 21:51)      INTERVAL HPI/OVERNIGHT EVENTS: none     MEDICATIONS  (STANDING):  bictegravir 50 mG/emtricitabine 200 mG/tenofovir alafenamide 25 mG (BIKTARVY) 1 Tablet(s) Oral daily  diltiazem    milliGRAM(s) Oral daily  ergocalciferol 30853 Unit(s) Oral every week  ezetimibe 10 milliGRAM(s) Oral daily  heparin   Injectable 5000 Unit(s) SubCutaneous every 8 hours  lactulose Syrup 10 Gram(s) Oral daily  metoprolol succinate  milliGRAM(s) Oral daily  piperacillin/tazobactam IVPB.. 3.375 Gram(s) IV Intermittent every 8 hours  senna 1 Tablet(s) Oral daily  tamsulosin 0.4 milliGRAM(s) Oral at bedtime    MEDICATIONS  (PRN):  acetaminophen     Tablet .. 650 milliGRAM(s) Oral every 6 hours PRN Temp greater or equal to 38C (100.4F), Mild Pain (1 - 3)  ALPRAZolam 1 milliGRAM(s) Oral every 6 hours PRN for anxiety  aluminum hydroxide/magnesium hydroxide/simethicone Suspension 30 milliLiter(s) Oral every 4 hours PRN Dyspepsia  melatonin 3 milliGRAM(s) Oral at bedtime PRN Insomnia  ondansetron Injectable 4 milliGRAM(s) IV Push every 8 hours PRN Nausea and/or Vomiting  zolpidem 5 milliGRAM(s) Oral at bedtime PRN Insomnia  zolpidem 5 milliGRAM(s) Oral at bedtime PRN Insomnia      No Known Allergies    Intolerances        REVIEW OF SYSTEMS:  CONSTITUTIONAL: No fever, weight loss  EYES: No eye pain, visual disturbances, or discharge  ENMT:  No difficulty hearing, tinnitus, vertigo; No sinus or throat pain  RESPIRATORY: No cough, wheezing, chills or hemoptysis; No shortness of breath  CARDIOVASCULAR: No chest pain, palpitations, dizziness, or leg swelling  GASTROINTESTINAL: No abdominal or epigastric pain. No nausea, vomiting, or hematemesis; No diarrhea or constipation. No melena or hematochezia.  GENITOURINARY: No dysuria, frequency, hematuria, or incontinence  NEUROLOGICAL: No headaches, memory loss, loss of strength, numbness, or tremors  SKIN: No itching, burning, rashes, or lesions   MUSCULOSKELETAL: No joint pain or swelling; No muscle, back, or extremity pain  PSYCHIATRIC: No depression, anxiety, mood swings, or difficulty sleeping  HEME/LYMPH: No easy bruising, or bleeding gums    Vital Signs Last 24 Hrs  T(C): 37.2 (03 Sep 2024 11:12), Max: 37.2 (02 Sep 2024 17:02)  T(F): 98.9 (03 Sep 2024 11:12), Max: 98.9 (02 Sep 2024 17:02)  HR: 89 (03 Sep 2024 11:12) (75 - 89)  BP: 130/93 (03 Sep 2024 11:12) (115/75 - 130/93)  BP(mean): --  RR: 17 (03 Sep 2024 11:12) (17 - 18)  SpO2: 98% (03 Sep 2024 11:12) (97% - 98%)    Parameters below as of 03 Sep 2024 11:12  Patient On (Oxygen Delivery Method): room air            PHYSICAL EXAM:  GENERAL: NAD  HEAD:  Atraumatic, Normocephalic  EYES: EOMI, PERRLA, conjunctiva and sclera clear  ENMT: No tonsillar erythema, exudates, or enlargement;   NECK: Supple, Normal thyroid  NERVOUS SYSTEM:  Alert & Oriented X3, Good concentration; Motor Strength 5/5 B/L upper and lower extremities; DTRs 2+ intact and symmetric  CHEST/LUNG: CTABL; No rales, rhonchi, wheezing, or rubs  HEART: Regular rate and rhythm; No murmurs, rubs, or gallops  ABDOMEN: Soft, Nontender, Nondistended; Bowel sounds present  EXTREMITIES:  2+ Peripheral Pulses, No clubbing, cyanosis, or edema  LYMPH: No lymphadenopathy noted  SKIN: No rashes or lesions    LABS:                                   12.5   6.00  )-----------( 256      ( 02 Sep 2024 06:22 )             37.9     09-02    141  |  108  |  16  ----------------------------<  101<H>  3.8   |  28  |  0.99    Ca    9.3      02 Sep 2024 06:22        Urinalysis Basic - ( 02 Sep 2024 06:22 )    Color: x / Appearance: x / SG: x / pH: x  Gluc: 101 mg/dL / Ketone: x  / Bili: x / Urobili: x   Blood: x / Protein: x / Nitrite: x   Leuk Esterase: x / RBC: x / WBC x   Sq Epi: x / Non Sq Epi: x / Bacteria: x      
Patient is a 59y old  Male who presents with a chief complaint of Moderate L hydronephrosis (31 Aug 2024 21:51)      INTERVAL HPI/OVERNIGHT EVENTS: none     MEDICATIONS  (STANDING):  bictegravir 50 mG/emtricitabine 200 mG/tenofovir alafenamide 25 mG (BIKTARVY) 1 Tablet(s) Oral daily  diltiazem    milliGRAM(s) Oral daily  ergocalciferol 51866 Unit(s) Oral every week  ezetimibe 10 milliGRAM(s) Oral daily  heparin   Injectable 5000 Unit(s) SubCutaneous every 8 hours  lactulose Syrup 10 Gram(s) Oral daily  metoprolol succinate  milliGRAM(s) Oral daily  piperacillin/tazobactam IVPB.. 3.375 Gram(s) IV Intermittent every 8 hours  senna 1 Tablet(s) Oral daily  tamsulosin 0.4 milliGRAM(s) Oral at bedtime    MEDICATIONS  (PRN):  acetaminophen     Tablet .. 650 milliGRAM(s) Oral every 6 hours PRN Temp greater or equal to 38C (100.4F), Mild Pain (1 - 3)  ALPRAZolam 1 milliGRAM(s) Oral every 6 hours PRN for anxiety  aluminum hydroxide/magnesium hydroxide/simethicone Suspension 30 milliLiter(s) Oral every 4 hours PRN Dyspepsia  melatonin 3 milliGRAM(s) Oral at bedtime PRN Insomnia  ondansetron Injectable 4 milliGRAM(s) IV Push every 8 hours PRN Nausea and/or Vomiting  zolpidem 5 milliGRAM(s) Oral at bedtime PRN Insomnia  zolpidem 5 milliGRAM(s) Oral at bedtime PRN Insomnia    Allergies    No Known Allergies    Intolerances        REVIEW OF SYSTEMS:  CONSTITUTIONAL: No fever, weight loss  EYES: No eye pain, visual disturbances, or discharge  ENMT:  No difficulty hearing, tinnitus, vertigo; No sinus or throat pain  RESPIRATORY: No cough, wheezing, chills or hemoptysis; No shortness of breath  CARDIOVASCULAR: No chest pain, palpitations, dizziness, or leg swelling  GASTROINTESTINAL: No abdominal or epigastric pain. No nausea, vomiting, or hematemesis; No diarrhea or constipation. No melena or hematochezia.  GENITOURINARY: No dysuria, frequency, hematuria, or incontinence  NEUROLOGICAL: No headaches, memory loss, loss of strength, numbness, or tremors  SKIN: No itching, burning, rashes, or lesions   MUSCULOSKELETAL: No joint pain or swelling; No muscle, back, or extremity pain  PSYCHIATRIC: No depression, anxiety, mood swings, or difficulty sleeping  HEME/LYMPH: No easy bruising, or bleeding gums    Vital Signs Last 24 Hrs  T(C): 36.7 (02 Sep 2024 11:47), Max: 37.5 (01 Sep 2024 17:22)  T(F): 98.1 (02 Sep 2024 11:47), Max: 99.5 (01 Sep 2024 17:22)  HR: 89 (02 Sep 2024 11:47) (83 - 97)  BP: 142/93 (02 Sep 2024 11:47) (109/73 - 142/93)  BP(mean): --  RR: 18 (02 Sep 2024 11:47) (17 - 18)  SpO2: 98% (02 Sep 2024 11:47) (95% - 98%)    Parameters below as of 02 Sep 2024 11:47  Patient On (Oxygen Delivery Method): room air          PHYSICAL EXAM:  GENERAL: NAD  HEAD:  Atraumatic, Normocephalic  EYES: EOMI, PERRLA, conjunctiva and sclera clear  ENMT: No tonsillar erythema, exudates, or enlargement;   NECK: Supple, Normal thyroid  NERVOUS SYSTEM:  Alert & Oriented X3, Good concentration; Motor Strength 5/5 B/L upper and lower extremities; DTRs 2+ intact and symmetric  CHEST/LUNG: CTABL; No rales, rhonchi, wheezing, or rubs  HEART: Regular rate and rhythm; No murmurs, rubs, or gallops  ABDOMEN: Soft, Nontender, Nondistended; Bowel sounds present  EXTREMITIES:  2+ Peripheral Pulses, No clubbing, cyanosis, or edema  LYMPH: No lymphadenopathy noted  SKIN: No rashes or lesions    LABS:                                               12.5   6.00  )-----------( 256      ( 02 Sep 2024 06:22 )             37.9     09-02    141  |  108  |  16  ----------------------------<  101<H>  3.8   |  28  |  0.99    Ca    9.3      02 Sep 2024 06:22    TPro  7.6  /  Alb  3.5  /  TBili  0.8  /  DBili  x   /  AST  8<L>  /  ALT  20  /  AlkPhos  113  09-01      Urinalysis Basic - ( 02 Sep 2024 06:22 )    Color: x / Appearance: x / SG: x / pH: x  Gluc: 101 mg/dL / Ketone: x  / Bili: x / Urobili: x   Blood: x / Protein: x / Nitrite: x   Leuk Esterase: x / RBC: x / WBC x   Sq Epi: x / Non Sq Epi: x / Bacteria: x       [ ] NO

## 2024-09-10 ENCOUNTER — NON-APPOINTMENT (OUTPATIENT)
Age: 59
End: 2024-09-10

## 2024-09-10 DIAGNOSIS — R22.9 LOCALIZED SWELLING, MASS AND LUMP, UNSPECIFIED: ICD-10-CM

## 2024-09-10 DIAGNOSIS — Z85.46 PERSONAL HISTORY OF MALIGNANT NEOPLASM OF PROSTATE: ICD-10-CM

## 2024-09-10 DIAGNOSIS — T83.092A OTHER MECHANICAL COMPLICATION OF NEPHROSTOMY CATHETER, INITIAL ENCOUNTER: ICD-10-CM

## 2024-09-10 DIAGNOSIS — N40.0 BENIGN PROSTATIC HYPERPLASIA WITHOUT LOWER URINARY TRACT SYMPTOMS: ICD-10-CM

## 2024-09-10 DIAGNOSIS — I10 ESSENTIAL (PRIMARY) HYPERTENSION: ICD-10-CM

## 2024-09-10 DIAGNOSIS — F41.9 ANXIETY DISORDER, UNSPECIFIED: ICD-10-CM

## 2024-09-10 DIAGNOSIS — N13.6 PYONEPHROSIS: ICD-10-CM

## 2024-09-10 DIAGNOSIS — K59.00 CONSTIPATION, UNSPECIFIED: ICD-10-CM

## 2024-09-10 DIAGNOSIS — Z21 ASYMPTOMATIC HUMAN IMMUNODEFICIENCY VIRUS [HIV] INFECTION STATUS: ICD-10-CM

## 2024-09-10 DIAGNOSIS — Z79.899 OTHER LONG TERM (CURRENT) DRUG THERAPY: ICD-10-CM

## 2024-09-10 DIAGNOSIS — Z85.048 PERSONAL HISTORY OF OTHER MALIGNANT NEOPLASM OF RECTUM, RECTOSIGMOID JUNCTION, AND ANUS: ICD-10-CM

## 2024-09-24 DIAGNOSIS — Z21 ASYMPTOMATIC HUMAN IMMUNODEFICIENCY VIRUS [HIV] INFECTION STATUS: ICD-10-CM

## 2024-09-26 ENCOUNTER — OUTPATIENT (OUTPATIENT)
Dept: OUTPATIENT SERVICES | Facility: HOSPITAL | Age: 59
LOS: 1 days | End: 2024-09-26
Payer: MEDICAID

## 2024-09-26 ENCOUNTER — APPOINTMENT (OUTPATIENT)
Dept: INFECTIOUS DISEASE | Facility: CLINIC | Age: 59
End: 2024-09-26

## 2024-09-26 DIAGNOSIS — F41.9 ANXIETY DISORDER, UNSPECIFIED: ICD-10-CM

## 2024-09-26 DIAGNOSIS — B20 HUMAN IMMUNODEFICIENCY VIRUS [HIV] DISEASE: ICD-10-CM

## 2024-09-26 DIAGNOSIS — G47.00 INSOMNIA, UNSPECIFIED: ICD-10-CM

## 2024-09-26 DIAGNOSIS — Z98.890 OTHER SPECIFIED POSTPROCEDURAL STATES: Chronic | ICD-10-CM

## 2024-09-26 DIAGNOSIS — Z96.0 PRESENCE OF UROGENITAL IMPLANTS: Chronic | ICD-10-CM

## 2024-09-26 DIAGNOSIS — K62.9 DISEASE OF ANUS AND RECTUM, UNSPECIFIED: Chronic | ICD-10-CM

## 2024-09-26 LAB
ALBUMIN SERPL ELPH-MCNC: 4.7 G/DL — SIGNIFICANT CHANGE UP (ref 3.3–5)
ALP SERPL-CCNC: 125 U/L — HIGH (ref 40–120)
ALT FLD-CCNC: 8 U/L — LOW (ref 10–45)
ANION GAP SERPL CALC-SCNC: 12 MMOL/L — SIGNIFICANT CHANGE UP (ref 5–17)
AST SERPL-CCNC: 12 U/L — SIGNIFICANT CHANGE UP (ref 10–40)
BILIRUB SERPL-MCNC: <0.2 MG/DL — SIGNIFICANT CHANGE UP (ref 0.2–1.2)
BUN SERPL-MCNC: 18 MG/DL — SIGNIFICANT CHANGE UP (ref 7–23)
CALCIUM SERPL-MCNC: 10.3 MG/DL — SIGNIFICANT CHANGE UP (ref 8.4–10.5)
CHLORIDE SERPL-SCNC: 101 MMOL/L — SIGNIFICANT CHANGE UP (ref 96–108)
CO2 SERPL-SCNC: 30 MMOL/L — SIGNIFICANT CHANGE UP (ref 22–31)
CREAT SERPL-MCNC: 1.01 MG/DL — SIGNIFICANT CHANGE UP (ref 0.5–1.3)
EGFR: 86 ML/MIN/1.73M2 — SIGNIFICANT CHANGE UP
GLUCOSE SERPL-MCNC: 116 MG/DL — HIGH (ref 70–99)
HCT VFR BLD CALC: 41.7 % — SIGNIFICANT CHANGE UP (ref 39–50)
HGB BLD-MCNC: 13.4 G/DL — SIGNIFICANT CHANGE UP (ref 13–17)
MCHC RBC-ENTMCNC: 29 PG — SIGNIFICANT CHANGE UP (ref 27–34)
MCHC RBC-ENTMCNC: 32.1 GM/DL — SIGNIFICANT CHANGE UP (ref 32–36)
MCV RBC AUTO: 90.3 FL — SIGNIFICANT CHANGE UP (ref 80–100)
PLATELET # BLD AUTO: 300 K/UL — SIGNIFICANT CHANGE UP (ref 150–400)
POTASSIUM SERPL-MCNC: 4.7 MMOL/L — SIGNIFICANT CHANGE UP (ref 3.5–5.3)
POTASSIUM SERPL-SCNC: 4.7 MMOL/L — SIGNIFICANT CHANGE UP (ref 3.5–5.3)
PROT SERPL-MCNC: 7.7 G/DL — SIGNIFICANT CHANGE UP (ref 6–8.3)
RBC # BLD: 4.62 M/UL — SIGNIFICANT CHANGE UP (ref 4.2–5.8)
RBC # FLD: 14.5 % — SIGNIFICANT CHANGE UP (ref 10.3–14.5)
SODIUM SERPL-SCNC: 143 MMOL/L — SIGNIFICANT CHANGE UP (ref 135–145)
WBC # BLD: 6.72 K/UL — SIGNIFICANT CHANGE UP (ref 3.8–10.5)
WBC # FLD AUTO: 6.72 K/UL — SIGNIFICANT CHANGE UP (ref 3.8–10.5)

## 2024-09-26 PROCEDURE — 85027 COMPLETE CBC AUTOMATED: CPT

## 2024-09-26 PROCEDURE — 99214 OFFICE O/P EST MOD 30 MIN: CPT

## 2024-09-26 PROCEDURE — 86360 T CELL ABSOLUTE COUNT/RATIO: CPT

## 2024-09-26 PROCEDURE — 80053 COMPREHEN METABOLIC PANEL: CPT

## 2024-09-26 PROCEDURE — 87536 HIV-1 QUANT&REVRSE TRNSCRPJ: CPT

## 2024-09-26 PROCEDURE — G0463: CPT

## 2024-09-26 PROCEDURE — 36415 COLL VENOUS BLD VENIPUNCTURE: CPT

## 2024-09-26 PROCEDURE — 86359 T CELLS TOTAL COUNT: CPT

## 2024-09-26 RX ORDER — TRAZODONE HYDROCHLORIDE 50 MG/1
50 TABLET ORAL
Qty: 60 | Refills: 3 | Status: ACTIVE | COMMUNITY
Start: 2024-09-26 | End: 1900-01-01

## 2024-09-26 RX ORDER — CLONAZEPAM 1 MG/1
1 TABLET ORAL
Qty: 90 | Refills: 0 | Status: ACTIVE | COMMUNITY
Start: 2024-09-26 | End: 1900-01-01

## 2024-09-27 LAB
4/8 RATIO: 0.42 RATIO — LOW (ref 0.9–3.6)
ABS CD8: 1769 CELLS/UL — HIGH (ref 142–740)
CD3 BLASTS SPEC-ACNC: 2558 CELLS/UL — HIGH (ref 672–1870)
CD3 BLASTS SPEC-ACNC: 83 % — SIGNIFICANT CHANGE UP (ref 59–83)
CD4 %: 24 % — LOW (ref 30–62)
CD8 %: 57 % — HIGH (ref 12–36)
HIV-1 VIRAL LOAD RESULT: SIGNIFICANT CHANGE UP
HIV1 RNA # SERPL NAA+PROBE: SIGNIFICANT CHANGE UP COPIES/ML
HIV1 RNA SER-IMP: SIGNIFICANT CHANGE UP
HIV1 RNA SERPL NAA+PROBE-ACNC: SIGNIFICANT CHANGE UP
HIV1 RNA SERPL NAA+PROBE-LOG#: SIGNIFICANT CHANGE UP LG COP/ML
T-CELL CD4 SUBSET PNL BLD: 745 CELLS/UL — SIGNIFICANT CHANGE UP (ref 489–1457)

## 2024-09-30 ENCOUNTER — NON-APPOINTMENT (OUTPATIENT)
Age: 59
End: 2024-09-30

## 2024-09-30 NOTE — RISK ASSESSMENT
[Clinical Interview] : Clinical Interview [No] : No [Yes, more than three months ago] : Yes, more than three months ago [Mood disorder] : mood disorder [Alcohol/Substance Use disorders] : alcohol/substance use disorders [Depressed mood/Anhedonia] : depressed mood/anhedonia [Family Hx of suicide/suicidal behavior] : family history of suicide/suicidal behavior [None known] : None known [Identifies reasons for living] : identifies reasons for living [Supportive social network of family or friends] : supportive social network of family or friends [Cultural, spiritual and/or moral attitudes against suicide] : cultural, spiritual and/or moral attitudes against suicide [Positive therapeutic relationships] : positive therapeutic relationships [Fear of death/actual act of killing self] : fear of death or the actual act of killing self [TextBox_32] : as per hpi [None in the patient's lifetime] : None in the patient's lifetime [No known risk factors] : No known risk factors [Residential stability] : residential stability [Relationship stability] : relationship stability [Affective stability] : affective stability [Sobriety] : sobriety [Insight into violence risk and need for management/treatment] : insight into violence risk and need for management/treatment [Engagement in treatment] : engagement in treatment [Good treatment response/compliance] : good treatment response/compliance

## 2024-09-30 NOTE — DISCUSSION/SUMMARY
[FreeTextEntry1] : 59 yr old man with a hx of HIV/AIDS diagnosed in 1991 (tested because of needle use) last used opioids 40 yrs ago, now here with anxiety and insomnia. 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    He admits to a past history of opioid use and was in a methadone program years ago.  He says that he currently only smokes cannabis for medical reasons.  He has also had multiple surgeries and now has an ostomy bag after colon surgery, and says this has also been a great source of anxiety.  He has been seeing a psychiatrist, Dr Yin  [phone] ext [phone], for many years but is worried that she may be soon retiring.  He sees her virtually and she prescribes Xanax twice daily which has not been helping with his anxiety.  Discussed the risk of taking a short acting benzodiazepine as Xanax and recommended switching to Clonazepam.  Pt has no thoughts of harming self or others and no thoughs of suicide.  He feels that his wife is a great support for him and says that he is able to speak to her.  [Low acute suicide risk] : Low acute suicide risk [No] : No [Yes] : Safety Plan completed/updated (for individuals at risk): Yes

## 2024-09-30 NOTE — REASON FOR VISIT
[FreeTextEntry1] : 59 yr old man with a hx of HIV/AIDS diagnosed in 1991 (tested because of needle use) last used opioids 40 yrs ago, now here with anxiety and insomnia.

## 2024-09-30 NOTE — HISTORY OF PRESENT ILLNESS
[FreeTextEntry1] : 59 yr old man with a hx of HIV/AIDS diagnosed in 1991 (tested because of needle use) last used opioids 40 yrs ago, now here with anxiety and insomnia. 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  He admits to a past history of opioid use and was in a methadone program years ago.  He says that he currently only smokes cannabis for medical reasons.  He has also had multiple surgeries and now has an ostomy bag after colon surgery, and says this has also been a great source of anxiety.  He has been seeing a psychiatrist, Dr Yin  [phone], for many years but is worried that she may be soon retiring.  He sees her virtually and she prescribes Xanax twice daily which has not been helping with his anxiety.  Discussed the risk of taking a short acting benzodiazepine as Xanax and recommended switching to Clonazepam.  Pt has no thoughts of harming self or others and no thoughs of suicide.  He feels that his wife is a great support for him and says that he is able to speak to her.

## 2024-09-30 NOTE — DISCUSSION/SUMMARY
[FreeTextEntry1] : 59 yr old man with a hx of HIV/AIDS diagnosed in 1991 (tested because of needle use) last used opioids 40 yrs ago, now here with anxiety and insomnia. 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    He admits to a past history of opioid use and was in a methadone program years ago.  He says that he currently only smokes cannabis for medical reasons.  He has also had multiple surgeries and now has an ostomy bag after colon surgery, and says this has also been a great source of anxiety.  He has been seeing a psychiatrist, Dr Yin  [phone] ext [phone], for many years but is worried that she may be soon retiring.  He sees her virtually and she prescribes Xanax twice daily which has not been helping with his anxiety.  Discussed the risk of taking a short acting benzodiazepine as Xanax and recommended switching to Clonazepam.  Pt has no thoughts of harming self or others and no thoughs of suicide.  He feels that his wife is a great support for him and says that he is able to speak to her.  [Low acute suicide risk] : Low acute suicide risk [No] : No [Yes] : Safety Plan completed/updated (for individuals at risk): Yes

## 2024-10-01 ENCOUNTER — APPOINTMENT (OUTPATIENT)
Dept: CARDIOLOGY | Facility: CLINIC | Age: 59
End: 2024-10-01
Payer: MEDICAID

## 2024-10-01 VITALS
TEMPERATURE: 98.8 F | BODY MASS INDEX: 28.56 KG/M2 | SYSTOLIC BLOOD PRESSURE: 110 MMHG | HEIGHT: 71 IN | HEART RATE: 85 BPM | OXYGEN SATURATION: 98 % | DIASTOLIC BLOOD PRESSURE: 70 MMHG | WEIGHT: 204 LBS

## 2024-10-01 DIAGNOSIS — E78.49 OTHER HYPERLIPIDEMIA: ICD-10-CM

## 2024-10-01 DIAGNOSIS — R60.0 LOCALIZED EDEMA: ICD-10-CM

## 2024-10-01 PROCEDURE — G2211 COMPLEX E/M VISIT ADD ON: CPT | Mod: NC

## 2024-10-01 PROCEDURE — 99214 OFFICE O/P EST MOD 30 MIN: CPT

## 2024-10-01 NOTE — HISTORY OF PRESENT ILLNESS
[FreeTextEntry1] : 59M HIV+ with h/o of anal/prostate cancer s/p chemo/RT, HLD, HTN who presents for cardiac eval   Feeling well generally Continues to note LE edema, had stopped Lasix, now taking again, no real change  Recent hospitalization for fractured nephrostomy tube, s/p exchange Patient denies chest pain, shortness of breath, palpitations, dizziness, lightheadedness, syncope. No formal exercise, limited walking abilities due to pain  Multiple medical issues related to radiation to pelvic region Urinary stents, has a colostomy, bilateral nephrostomy tubes  Former smoker, quit 4yrs ago. Mother - HLD. Currently disabled.   ECG: SR, SHYANN, RVH, tall T V1 (unchanged from prior) NST 10/2023: normal  TTE 8/2022: normal LV fxn, no significant valvular disease, thickened pericardium, ?residual pericarditis  Zetia 10mg   Diltiazem 240mg Furosemide 40mg daily Toprol 100mg  Yes

## 2024-10-01 NOTE — DISCUSSION/SUMMARY
[FreeTextEntry1] : LE edema: Suspect venous insufficiency. Would continue lasix 40mg daily at this point. Leg elevation, compression socks. Repeat TTE. Last normal 2022. Trend llabs  HTN: BP great, continue meds. Last CMP normal  HLD: Lipids decent on Zetia only. Off statin due to myalgias  Repeat TTE  RV 3M

## 2024-10-03 NOTE — ED ADULT NURSE NOTE - NS ED PATIENT SAFETY CONCERN
How Severe Are Your Spot(S)?: moderate What Type Of Note Output Would You Prefer (Optional)?: Standard Output What Is The Reason For Today's Visit?: Full Body Skin Examination What Is The Reason For Today's Visit? (Being Monitored For X): concerning skin lesions on an annual basis No

## 2024-10-07 ENCOUNTER — APPOINTMENT (OUTPATIENT)
Dept: INTERNAL MEDICINE | Facility: CLINIC | Age: 59
End: 2024-10-07
Payer: MEDICAID

## 2024-10-07 VITALS
BODY MASS INDEX: 28.42 KG/M2 | TEMPERATURE: 96.6 F | WEIGHT: 203 LBS | SYSTOLIC BLOOD PRESSURE: 113 MMHG | DIASTOLIC BLOOD PRESSURE: 78 MMHG | OXYGEN SATURATION: 99 % | HEART RATE: 86 BPM | HEIGHT: 71 IN

## 2024-10-07 DIAGNOSIS — C21.0 MALIGNANT NEOPLASM OF ANUS, UNSPECIFIED: ICD-10-CM

## 2024-10-07 DIAGNOSIS — Z93.6 OTHER ARTIFICIAL OPENINGS OF URINARY TRACT STATUS: ICD-10-CM

## 2024-10-07 DIAGNOSIS — I10 ESSENTIAL (PRIMARY) HYPERTENSION: ICD-10-CM

## 2024-10-07 DIAGNOSIS — R10.9 UNSPECIFIED ABDOMINAL PAIN: ICD-10-CM

## 2024-10-07 DIAGNOSIS — Z93.9 ARTIFICIAL OPENING STATUS, UNSPECIFIED: ICD-10-CM

## 2024-10-07 DIAGNOSIS — Z43.6 ENCOUNTER FOR ATTENTION TO OTHER ARTIFICIAL OPENINGS OF URINARY TRACT: ICD-10-CM

## 2024-10-07 PROCEDURE — 99214 OFFICE O/P EST MOD 30 MIN: CPT

## 2024-10-07 PROCEDURE — G2211 COMPLEX E/M VISIT ADD ON: CPT | Mod: NC

## 2024-10-15 NOTE — ED PROVIDER NOTE - NS ED MD TWO NIGHTS YN
Social Work Case Note    10/15/24    Clinic Name: [facility]  [facility]  [address] [place] [id number]    Duration: 15 minutes     Encounter Type: Telephone Encounter      Data: Spoke with daughter Ekaterina.     Assessment: Ekaterina shared that she reached out to  to assist with Medicaid process. [facility] is living at [facility], and can stay there once on Medicaid. They have discussed with facility administration. Family is also considering signing [facility] onto hospice pending MRI results. Ekaterina was calling to confirm she is on the right track and inquire about any additional resources.     Plan:  Reassured Ekaterina that she is already working with the appropriate agencies and resources. She was encouraged to reach out for additional support at anytime.         LIN Del Cid       Yes

## 2024-10-22 NOTE — ED BEHAVIORAL HEALTH ASSESSMENT NOTE - REASON FOR REFERRAL
Medical Necessity Information: It is in your best interest to select a reason for this procedure from the list below. All of these items fulfill various CMS LCD requirements except lesion extends to a margin. Include Z78.9 (Other Specified Conditions Influencing Health Status) As An Associated Diagnosis?: No Medical Necessity Clause: This procedure was medically necessary because the lesion that was treated was: Lab: [id number] Lab Facility: 0 Referring Physician (Optional): Dr. Morrell Size Of Lesion In Cm: 1.7 X Size Of Lesion In Cm (Optional): 1.3 Size Of Margin In Cm: 0.2 Anesthesia Volume In Cc: 6 Eye Clamp Note Details: An eye clamp was used during the procedure. Excision Method: Elliptical Saucerization Depth: dermis and superficial adipose tissue Repair Type: Complex Intermediate / Complex Repair - Final Wound Length In Cm: 2.6 Suturegard Retention Suture: 2-0 Nylon Retention Suture Bite Size: 3 mm Length To Time In Minutes Device Was In Place: 10 Number Of Hemigard Strips Per Side: 1 Undermining Type: Entire Wound Debridement Text: The wound edges were debrided prior to proceeding with the closure to facilitate wound healing. Helical Rim Text: The closure involved the helical rim. Vermilion Border Text: The closure involved the vermilion border. Nostril Rim Text: The closure involved the nostril rim. Complex Requirements: Retention Sutures?: Yes Retention Suture Text: Retention sutures were placed to support the closure and prevent dehiscence. Suture Removal: 14 days Epidermal Closure Graft Donor Site (Optional): simple interrupted Graft Donor Site Bandage (Optional-Leave Blank If You Don't Want In Note): Steri-strips and a pressure bandage were applied to the donor site. Detail Level: Detailed Excision Depth: adipose tissue Scalpel Size: 15 blade Anesthesia Type: 1% lidocaine with epinephrine Hemostasis: Electrocautery Estimated Blood Loss (Cc): minimal Repair Depth: use same depth as excision depth Deep Sutures: 4-0 Monocryl Epidermal Sutures: 5-0 Ethilon Epidermal Closure: running Wound Care: Mupirocin Dressing: pressure dressing with telfa Suturegard Intro: Intraoperative tissue expansion was performed, utilizing the SUTUREGARD device, in order to reduce wound tension. Suturegard Body: The suture ends were repeatedly re-tightened and re-clamped to achieve the desired tissue expansion. Hemigard Intro: Due to skin fragility and wound tension, it was decided to use HEMIGARD adhesive retention suture devices to permit a linear closure. The skin was cleaned and dried for a 6cm distance away from the wound. Excessive hair, if present, was removed to allow for adhesion. Hemigard Postcare Instructions: The HEMIGARD strips are to remain completely dry for at least 5-7 days. Positioning (Leave Blank If You Do Not Want): The patient was placed in a comfortable position exposing the surgical site. Complex Repair Preamble Text (Leave Blank If You Do Not Want): Extensive wide undermining was performed. Intermediate Repair Preamble Text (Leave Blank If You Do Not Want): Undermining was performed with blunt dissection. Fusiform Excision Additional Text (Leave Blank If You Do Not Want): The margin was drawn around the clinically apparent lesion.  A fusiform shape was then drawn on the skin incorporating the lesion and margins.  Incisions were then made along these lines to the appropriate tissue plane and the lesion was extirpated. Eliptical Excision Additional Text (Leave Blank If You Do Not Want): The margin was drawn around the clinically apparent lesion.  An elliptical shape was then drawn on the skin incorporating the lesion and margins.  Incisions were then made along these lines to the appropriate tissue plane and the lesion was extirpated. Saucerization Excision Additional Text (Leave Blank If You Do Not Want): The margin was drawn around the clinically apparent lesion.  Incisions were then made along these lines, in a tangential fashion, to the appropriate tissue plane and the lesion was extirpated. Slit Excision Additional Text (Leave Blank If You Do Not Want): A linear line was drawn on the skin overlying the lesion. An incision was made slowly until the lesion was visualized.  Once visualized, the lesion was removed with blunt dissection. Excisional Biopsy Additional Text (Leave Blank If You Do Not Want): The margin was drawn around the clinically apparent lesion. An elliptical shape was then drawn on the skin incorporating the lesion and margins.  Incisions were then made along these lines to the appropriate tissue plane and the lesion was extirpated. Perilesional Excision Additional Text (Leave Blank If You Do Not Want): The margin was drawn around the clinically apparent lesion. Incisions were then made along these lines to the appropriate tissue plane and the lesion was extirpated. Repair Performed By Another Provider Text (Leave Blank If You Do Not Want): After the tissue was excised the defect was repaired by another provider. No Repair - Repaired With Adjacent Surgical Defect Text (Leave Blank If You Do Not Want): After the excision the defect was repaired concurrently with another surgical defect which was in close approximation. Adjacent Tissue Transfer Text: The defect edges were debeveled with a #15 scalpel blade.  Given the location of the defect and the proximity to free margins an adjacent tissue transfer was deemed most appropriate.  Using a sterile surgical marker, an appropriate flap was drawn incorporating the defect and placing the expected incisions within the relaxed skin tension lines where possible.    The area thus outlined was incised deep to adipose tissue with a #15 scalpel blade.  The skin margins were undermined to an appropriate distance in all directions utilizing iris scissors. Advancement Flap (Single) Text: The defect edges were debeveled with a #15 scalpel blade.  Given the location of the defect and the proximity to free margins a single advancement flap was deemed most appropriate.  Using a sterile surgical marker, an appropriate advancement flap was drawn incorporating the defect and placing the expected incisions within the relaxed skin tension lines where possible.    The area thus outlined was incised deep to adipose tissue with a #15 scalpel blade.  The skin margins were undermined to an appropriate distance in all directions utilizing iris scissors. Advancement Flap (Double) Text: The defect edges were debeveled with a #15 scalpel blade.  Given the location of the defect and the proximity to free margins a double advancement flap was deemed most appropriate.  Using a sterile surgical marker, the appropriate advancement flaps were drawn incorporating the defect and placing the expected incisions within the relaxed skin tension lines where possible.    The area thus outlined was incised deep to adipose tissue with a #15 scalpel blade.  The skin margins were undermined to an appropriate distance in all directions utilizing iris scissors. Burow's Advancement Flap Text: The defect edges were debeveled with a #15 scalpel blade.  Given the location of the defect and the proximity to free margins a Burow's advancement flap was deemed most appropriate.  Using a sterile surgical marker, the appropriate advancement flap was drawn incorporating the defect and placing the expected incisions within the relaxed skin tension lines where possible.    The area thus outlined was incised deep to adipose tissue with a #15 scalpel blade.  The skin margins were undermined to an appropriate distance in all directions utilizing iris scissors. Chonodrocutaneous Helical Advancement Flap Text: The defect edges were debeveled with a #15 scalpel blade.  Given the location of the defect and the proximity to free margins a chondrocutaneous helical advancement flap was deemed most appropriate.  Using a sterile surgical marker, the appropriate advancement flap was drawn incorporating the defect and placing the expected incisions within the relaxed skin tension lines where possible.    The area thus outlined was incised deep to adipose tissue with a #15 scalpel blade.  The skin margins were undermined to an appropriate distance in all directions utilizing iris scissors. Crescentic Advancement Flap Text: The defect edges were debeveled with a #15 scalpel blade.  Given the location of the defect and the proximity to free margins a crescentic advancement flap was deemed most appropriate.  Using a sterile surgical marker, the appropriate advancement flap was drawn incorporating the defect and placing the expected incisions within the relaxed skin tension lines where possible.    The area thus outlined was incised deep to adipose tissue with a #15 scalpel blade.  The skin margins were undermined to an appropriate distance in all directions utilizing iris scissors. A-T Advancement Flap Text: The defect edges were debeveled with a #15 scalpel blade.  Given the location of the defect, shape of the defect and the proximity to free margins an A-T advancement flap was deemed most appropriate.  Using a sterile surgical marker, an appropriate advancement flap was drawn incorporating the defect and placing the expected incisions within the relaxed skin tension lines where possible.    The area thus outlined was incised deep to adipose tissue with a #15 scalpel blade.  The skin margins were undermined to an appropriate distance in all directions utilizing iris scissors. O-T Advancement Flap Text: The defect edges were debeveled with a #15 scalpel blade.  Given the location of the defect, shape of the defect and the proximity to free margins an O-T advancement flap was deemed most appropriate.  Using a sterile surgical marker, an appropriate advancement flap was drawn incorporating the defect and placing the expected incisions within the relaxed skin tension lines where possible.    The area thus outlined was incised deep to adipose tissue with a #15 scalpel blade.  The skin margins were undermined to an appropriate distance in all directions utilizing iris scissors. O-L Flap Text: The defect edges were debeveled with a #15 scalpel blade.  Given the location of the defect, shape of the defect and the proximity to free margins an O-L flap was deemed most appropriate.  Using a sterile surgical marker, an appropriate advancement flap was drawn incorporating the defect and placing the expected incisions within the relaxed skin tension lines where possible.    The area thus outlined was incised deep to adipose tissue with a #15 scalpel blade.  The skin margins were undermined to an appropriate distance in all directions utilizing iris scissors. O-Z Flap Text: The defect edges were debeveled with a #15 scalpel blade.  Given the location of the defect, shape of the defect and the proximity to free margins an O-Z flap was deemed most appropriate.  Using a sterile surgical marker, an appropriate transposition flap was drawn incorporating the defect and placing the expected incisions within the relaxed skin tension lines where possible. The area thus outlined was incised deep to adipose tissue with a #15 scalpel blade.  The skin margins were undermined to an appropriate distance in all directions utilizing iris scissors. Double O-Z Flap Text: The defect edges were debeveled with a #15 scalpel blade.  Given the location of the defect, shape of the defect and the proximity to free margins a Double O-Z flap was deemed most appropriate.  Using a sterile surgical marker, an appropriate transposition flap was drawn incorporating the defect and placing the expected incisions within the relaxed skin tension lines where possible. The area thus outlined was incised deep to adipose tissue with a #15 scalpel blade.  The skin margins were undermined to an appropriate distance in all directions utilizing iris scissors. V-Y Flap Text: The defect edges were debeveled with a #15 scalpel blade.  Given the location of the defect, shape of the defect and the proximity to free margins a V-Y flap was deemed most appropriate.  Using a sterile surgical marker, an appropriate advancement flap was drawn incorporating the defect and placing the expected incisions within the relaxed skin tension lines where possible.    The area thus outlined was incised deep to adipose tissue with a #15 scalpel blade.  The skin margins were undermined to an appropriate distance in all directions utilizing iris scissors. Advancement-Rotation Flap Text: The defect edges were debeveled with a #15 scalpel blade.  Given the location of the defect, shape of the defect and the proximity to free margins an advancement-rotation flap was deemed most appropriate.  Using a sterile surgical marker, an appropriate flap was drawn incorporating the defect and placing the expected incisions within the relaxed skin tension lines where possible. The area thus outlined was incised deep to adipose tissue with a #15 scalpel blade.  The skin margins were undermined to an appropriate distance in all directions utilizing iris scissors. Mercedes Flap Text: The defect edges were debeveled with a #15 scalpel blade.  Given the location of the defect, shape of the defect and the proximity to free margins a Mercedes flap was deemed most appropriate.  Using a sterile surgical marker, an appropriate advancement flap was drawn incorporating the defect and placing the expected incisions within the relaxed skin tension lines where possible. The area thus outlined was incised deep to adipose tissue with a #15 scalpel blade.  The skin margins were undermined to an appropriate distance in all directions utilizing iris scissors. Modified Advancement Flap Text: The defect edges were debeveled with a #15 scalpel blade.  Given the location of the defect, shape of the defect and the proximity to free margins a modified advancement flap was deemed most appropriate.  Using a sterile surgical marker, an appropriate advancement flap was drawn incorporating the defect and placing the expected incisions within the relaxed skin tension lines where possible.    The area thus outlined was incised deep to adipose tissue with a #15 scalpel blade.  The skin margins were undermined to an appropriate distance in all directions utilizing iris scissors. Mucosal Advancement Flap Text: Given the location of the defect, shape of the defect and the proximity to free margins a mucosal advancement flap was deemed most appropriate. Incisions were made with a 15 blade scalpel in the appropriate fashion along the cutaneous vermilion border and the mucosal lip. The remaining actinically damaged mucosal tissue was excised.  The mucosal advancement flap was then elevated to the gingival sulcus with care taken to preserve the neurovascular structures and advanced into the primary defect. Care was taken to ensure that precise realignment of the vermilion border was achieved. Peng Advancement Flap Text: The defect edges were debeveled with a #15 scalpel blade.  Given the location of the defect, shape of the defect and the proximity to free margins a Peng advancement flap was deemed most appropriate.  Using a sterile surgical marker, an appropriate advancement flap was drawn incorporating the defect and placing the expected incisions within the relaxed skin tension lines where possible. The area thus outlined was incised deep to adipose tissue with a #15 scalpel blade.  The skin margins were undermined to an appropriate distance in all directions utilizing iris scissors. Hatchet Flap Text: The defect edges were debeveled with a #15 scalpel blade.  Given the location of the defect, shape of the defect and the proximity to free margins a hatchet flap was deemed most appropriate.  Using a sterile surgical marker, an appropriate hatchet flap was drawn incorporating the defect and placing the expected incisions within the relaxed skin tension lines where possible.    The area thus outlined was incised deep to adipose tissue with a #15 scalpel blade.  The skin margins were undermined to an appropriate distance in all directions utilizing iris scissors. Rotation Flap Text: The defect edges were debeveled with a #15 scalpel blade.  Given the location of the defect, shape of the defect and the proximity to free margins a rotation flap was deemed most appropriate.  Using a sterile surgical marker, an appropriate rotation flap was drawn incorporating the defect and placing the expected incisions within the relaxed skin tension lines where possible.    The area thus outlined was incised deep to adipose tissue with a #15 scalpel blade.  The skin margins were undermined to an appropriate distance in all directions utilizing iris scissors. Bilateral Rotation Flap Text: The defect edges were debeveled with a #15 scalpel blade. Given the location of the defect, shape of the defect and the proximity to free margins a bilateral rotation flap was deemed most appropriate. Using a sterile surgical marker, an appropriate rotation flap was drawn incorporating the defect and placing the expected incisions within the relaxed skin tension lines where possible. The area thus outlined was incised deep to adipose tissue with a #15 scalpel blade. The skin margins were undermined to an appropriate distance in all directions utilizing iris scissors. Following this, the designed flap was carried over into the primary defect and sutured into place. Spiral Flap Text: The defect edges were debeveled with a #15 scalpel blade.  Given the location of the defect, shape of the defect and the proximity to free margins a spiral flap was deemed most appropriate.  Using a sterile surgical marker, an appropriate rotation flap was drawn incorporating the defect and placing the expected incisions within the relaxed skin tension lines where possible. The area thus outlined was incised deep to adipose tissue with a #15 scalpel blade.  The skin margins were undermined to an appropriate distance in all directions utilizing iris scissors. Staged Advancement Flap Text: The defect edges were debeveled with a #15 scalpel blade.  Given the location of the defect, shape of the defect and the proximity to free margins a staged advancement flap was deemed most appropriate.  Using a sterile surgical marker, an appropriate advancement flap was drawn incorporating the defect and placing the expected incisions within the relaxed skin tension lines where possible. The area thus outlined was incised deep to adipose tissue with a #15 scalpel blade.  The skin margins were undermined to an appropriate distance in all directions utilizing iris scissors. Star Wedge Flap Text: The defect edges were debeveled with a #15 scalpel blade.  Given the location of the defect, shape of the defect and the proximity to free margins a star wedge flap was deemed most appropriate.  Using a sterile surgical marker, an appropriate rotation flap was drawn incorporating the defect and placing the expected incisions within the relaxed skin tension lines where possible. The area thus outlined was incised deep to adipose tissue with a #15 scalpel blade.  The skin margins were undermined to an appropriate distance in all directions utilizing iris scissors. depression, SI? Transposition Flap Text: The defect edges were debeveled with a #15 scalpel blade.  Given the location of the defect and the proximity to free margins a transposition flap was deemed most appropriate.  Using a sterile surgical marker, an appropriate transposition flap was drawn incorporating the defect.    The area thus outlined was incised deep to adipose tissue with a #15 scalpel blade.  The skin margins were undermined to an appropriate distance in all directions utilizing iris scissors. Muscle Hinge Flap Text: The defect edges were debeveled with a #15 scalpel blade.  Given the size, depth and location of the defect and the proximity to free margins a muscle hinge flap was deemed most appropriate.  Using a sterile surgical marker, an appropriate hinge flap was drawn incorporating the defect. The area thus outlined was incised with a #15 scalpel blade.  The skin margins were undermined to an appropriate distance in all directions utilizing iris scissors. Mustarde Flap Text: The defect edges were debeveled with a #15 scalpel blade.  Given the size, depth and location of the defect and the proximity to free margins a Mustarde flap was deemed most appropriate.  Using a sterile surgical marker, an appropriate flap was drawn incorporating the defect. The area thus outlined was incised with a #15 scalpel blade.  The skin margins were undermined to an appropriate distance in all directions utilizing iris scissors. Nasal Turnover Hinge Flap Text: The defect edges were debeveled with a #15 scalpel blade.  Given the size, depth, location of the defect and the defect being full thickness a nasal turnover hinge flap was deemed most appropriate.  Using a sterile surgical marker, an appropriate hinge flap was drawn incorporating the defect. The area thus outlined was incised with a #15 scalpel blade. The flap was designed to recreate the nasal mucosal lining and the alar rim. The skin margins were undermined to an appropriate distance in all directions utilizing iris scissors. Nasalis-Muscle-Based Myocutaneous Island Pedicle Flap Text: Using a #15 blade, an incision was made around the donor flap to the level of the nasalis muscle. Wide lateral undermining was then performed in both the subcutaneous plane above the nasalis muscle, and in a submuscular plane just above periosteum. This allowed the formation of a free nasalis muscle axial pedicle (based on the angular artery) which was still attached to the actual cutaneous flap, increasing its mobility and vascular viability. Hemostasis was obtained with pinpoint electrocoagulation. The flap was mobilized into position and the pivotal anchor points positioned and stabilized with buried interrupted sutures. Subcutaneous and dermal tissues were closed in a multilayered fashion with sutures. Tissue redundancies were excised, and the epidermal edges were apposed without significant tension and sutured with sutures. Nasalis Myocutaneous Flap Text: Using a #15 blade, an incision was made around the donor flap to the level of the nasalis muscle. Wide lateral undermining was then performed in both the subcutaneous plane above the nasalis muscle, and in a submuscular plane just above periosteum. This allowed the formation of a free nasalis muscle axial pedicle which was still attached to the actual cutaneous flap, increasing its mobility and vascular viability. Hemostasis was obtained with pinpoint electrocoagulation. The flap was mobilized into position and the pivotal anchor points positioned and stabilized with buried interrupted sutures. Subcutaneous and dermal tissues were closed in a multilayered fashion with sutures. Tissue redundancies were excised, and the epidermal edges were apposed without significant tension and sutured with sutures. Nasolabial Transposition Flap Text: The defect edges were debeveled with a #15 scalpel blade.  Given the size, depth and location of the defect and the proximity to free margins a nasolabial transposition flap was deemed most appropriate. Using a sterile surgical marker, an appropriate flap was drawn incorporating the defect. The area thus outlined was incised with a #15 scalpel blade. The skin margins were undermined to an appropriate distance in all directions utilizing iris scissors. Following this, the designed flap was carried into the primary defect and sutured into place. Orbicularis Oris Muscle Flap Text: The defect edges were debeveled with a #15 scalpel blade.  Given that the defect affected the competency of the oral sphincter an orbicularis oris muscle flap was deemed most appropriate to restore this competency and normal muscle function.  Using a sterile surgical marker, an appropriate flap was drawn incorporating the defect. The area thus outlined was incised with a #15 scalpel blade. Melolabial Transposition Flap Text: The defect edges were debeveled with a #15 scalpel blade.  Given the location of the defect and the proximity to free margins a melolabial flap was deemed most appropriate.  Using a sterile surgical marker, an appropriate melolabial transposition flap was drawn incorporating the defect.    The area thus outlined was incised deep to adipose tissue with a #15 scalpel blade.  The skin margins were undermined to an appropriate distance in all directions utilizing iris scissors. Rectangular Flap Text: The defect edges were debeveled with a #15 scalpel blade. Given the location of the defect and the proximity to free margins a rectangular flap was deemed most appropriate. Using a sterile surgical marker, an appropriate rectangular flap was drawn incorporating the defect. The area thus outlined was incised deep to adipose tissue with a #15 scalpel blade. The skin margins were undermined to an appropriate distance in all directions utilizing iris scissors. Following this, the designed flap was carried over into the primary defect and sutured into place. Rhombic Flap Text: The defect edges were debeveled with a #15 scalpel blade.  Given the location of the defect and the proximity to free margins a rhombic flap was deemed most appropriate.  Using a sterile surgical marker, an appropriate rhombic flap was drawn incorporating the defect.    The area thus outlined was incised deep to adipose tissue with a #15 scalpel blade.  The skin margins were undermined to an appropriate distance in all directions utilizing iris scissors. Rhomboid Transposition Flap Text: The defect edges were debeveled with a #15 scalpel blade.  Given the location of the defect and the proximity to free margins a rhomboid transposition flap was deemed most appropriate.  Using a sterile surgical marker, an appropriate rhomboid flap was drawn incorporating the defect.    The area thus outlined was incised deep to adipose tissue with a #15 scalpel blade.  The skin margins were undermined to an appropriate distance in all directions utilizing iris scissors. Bi-Rhombic Flap Text: The defect edges were debeveled with a #15 scalpel blade.  Given the location of the defect and the proximity to free margins a bi-rhombic flap was deemed most appropriate.  Using a sterile surgical marker, an appropriate rhombic flap was drawn incorporating the defect. The area thus outlined was incised deep to adipose tissue with a #15 scalpel blade.  The skin margins were undermined to an appropriate distance in all directions utilizing iris scissors. Helical Rim Advancement Flap Text: The defect edges were debeveled with a #15 blade scalpel.  Given the location of the defect and the proximity to free margins (helical rim) a double helical rim advancement flap was deemed most appropriate.  Using a sterile surgical marker, the appropriate advancement flaps were drawn incorporating the defect and placing the expected incisions between the helical rim and antihelix where possible.  The area thus outlined was incised through and through with a #15 scalpel blade.  With a skin hook and iris scissors, the flaps were gently and sharply undermined and freed up. Bilateral Helical Rim Advancement Flap Text: The defect edges were debeveled with a #15 blade scalpel.  Given the location of the defect and the proximity to free margins (helical rim) a bilateral helical rim advancement flap was deemed most appropriate.  Using a sterile surgical marker, the appropriate advancement flaps were drawn incorporating the defect and placing the expected incisions between the helical rim and antihelix where possible.  The area thus outlined was incised through and through with a #15 scalpel blade.  With a skin hook and iris scissors, the flaps were gently and sharply undermined and freed up. Ear Star Wedge Flap Text: The defect edges were debeveled with a #15 blade scalpel.  Given the location of the defect and the proximity to free margins (helical rim) an ear star wedge flap was deemed most appropriate.  Using a sterile surgical marker, the appropriate flap was drawn incorporating the defect and placing the expected incisions between the helical rim and antihelix where possible.  The area thus outlined was incised through and through with a #15 scalpel blade. Flip-Flop Flap Text: The defect edges were debeveled with a #15 blade scalpel.  Given the location of the defect and the proximity to free margins a flip-flop flap was deemed most appropriate. Using a sterile surgical marker, the appropriate flap was drawn incorporating the defect and placing the expected incisions between the helical rim and antihelix where possible.  The area thus outlined was incised through and through with a #15 scalpel blade. Following this, the designed flap was carried over into the primary defect and sutured into place. Banner Transposition Flap Text: The defect edges were debeveled with a #15 scalpel blade.  Given the location of the defect and the proximity to free margins a Banner transposition flap was deemed most appropriate.  Using a sterile surgical marker, an appropriate flap drawn around the defect. The area thus outlined was incised deep to adipose tissue with a #15 scalpel blade.  The skin margins were undermined to an appropriate distance in all directions utilizing iris scissors. Bilobed Flap Text: The defect edges were debeveled with a #15 scalpel blade.  Given the location of the defect and the proximity to free margins a bilobe flap was deemed most appropriate.  Using a sterile surgical marker, an appropriate bilobe flap drawn around the defect.    The area thus outlined was incised deep to adipose tissue with a #15 scalpel blade.  The skin margins were undermined to an appropriate distance in all directions utilizing iris scissors. Bilobed Transposition Flap Text: The defect edges were debeveled with a #15 scalpel blade.  Given the location of the defect and the proximity to free margins a bilobed transposition flap was deemed most appropriate.  Using a sterile surgical marker, an appropriate bilobe flap drawn around the defect.    The area thus outlined was incised deep to adipose tissue with a #15 scalpel blade.  The skin margins were undermined to an appropriate distance in all directions utilizing iris scissors. Trilobed Flap Text: The defect edges were debeveled with a #15 scalpel blade.  Given the location of the defect and the proximity to free margins a trilobed flap was deemed most appropriate.  Using a sterile surgical marker, an appropriate trilobed flap drawn around the defect.    The area thus outlined was incised deep to adipose tissue with a #15 scalpel blade.  The skin margins were undermined to an appropriate distance in all directions utilizing iris scissors. Dorsal Nasal Flap Text: The defect edges were debeveled with a #15 scalpel blade.  Given the location of the defect and the proximity to free margins a dorsal nasal flap was deemed most appropriate.  Using a sterile surgical marker, an appropriate dorsal nasal flap was drawn around the defect.    The area thus outlined was incised deep to adipose tissue with a #15 scalpel blade.  The skin margins were undermined to an appropriate distance in all directions utilizing iris scissors. Island Pedicle Flap Text: The defect edges were debeveled with a #15 scalpel blade.  Given the location of the defect, shape of the defect and the proximity to free margins an island pedicle advancement flap was deemed most appropriate.  Using a sterile surgical marker, an appropriate advancement flap was drawn incorporating the defect, outlining the appropriate donor tissue and placing the expected incisions within the relaxed skin tension lines where possible.    The area thus outlined was incised deep to adipose tissue with a #15 scalpel blade.  The skin margins were undermined to an appropriate distance in all directions around the primary defect and laterally outward around the island pedicle utilizing iris scissors.  There was minimal undermining beneath the pedicle flap. Island Pedicle Flap With Canthal Suspension Text: The defect edges were debeveled with a #15 scalpel blade.  Given the location of the defect, shape of the defect and the proximity to free margins an island pedicle advancement flap was deemed most appropriate.  Using a sterile surgical marker, an appropriate advancement flap was drawn incorporating the defect, outlining the appropriate donor tissue and placing the expected incisions within the relaxed skin tension lines where possible. The area thus outlined was incised deep to adipose tissue with a #15 scalpel blade.  The skin margins were undermined to an appropriate distance in all directions around the primary defect and laterally outward around the island pedicle utilizing iris scissors.  There was minimal undermining beneath the pedicle flap. A suspension suture was placed in the canthal tendon to prevent tension and prevent ectropion. Alar Island Pedicle Flap Text: The defect edges were debeveled with a #15 scalpel blade.  Given the location of the defect, shape of the defect and the proximity to the alar rim an island pedicle advancement flap was deemed most appropriate.  Using a sterile surgical marker, an appropriate advancement flap was drawn incorporating the defect, outlining the appropriate donor tissue and placing the expected incisions within the nasal ala running parallel to the alar rim. The area thus outlined was incised with a #15 scalpel blade.  The skin margins were undermined minimally to an appropriate distance in all directions around the primary defect and laterally outward around the island pedicle utilizing iris scissors.  There was minimal undermining beneath the pedicle flap. Double Island Pedicle Flap Text: The defect edges were debeveled with a #15 scalpel blade.  Given the location of the defect, shape of the defect and the proximity to free margins a double island pedicle advancement flap was deemed most appropriate.  Using a sterile surgical marker, an appropriate advancement flap was drawn incorporating the defect, outlining the appropriate donor tissue and placing the expected incisions within the relaxed skin tension lines where possible.    The area thus outlined was incised deep to adipose tissue with a #15 scalpel blade.  The skin margins were undermined to an appropriate distance in all directions around the primary defect and laterally outward around the island pedicle utilizing iris scissors.  There was minimal undermining beneath the pedicle flap. Island Pedicle Flap-Requiring Vessel Identification Text: The defect edges were debeveled with a #15 scalpel blade.  Given the location of the defect, shape of the defect and the proximity to free margins an island pedicle advancement flap was deemed most appropriate.  Using a sterile surgical marker, an appropriate advancement flap was drawn, based on the axial vessel mentioned above, incorporating the defect, outlining the appropriate donor tissue and placing the expected incisions within the relaxed skin tension lines where possible.    The area thus outlined was incised deep to adipose tissue with a #15 scalpel blade.  The skin margins were undermined to an appropriate distance in all directions around the primary defect and laterally outward around the island pedicle utilizing iris scissors.  There was minimal undermining beneath the pedicle flap. Keystone Flap Text: The defect edges were debeveled with a #15 scalpel blade.  Given the location of the defect, shape of the defect a keystone flap was deemed most appropriate.  Using a sterile surgical marker, an appropriate keystone flap was drawn incorporating the defect, outlining the appropriate donor tissue and placing the expected incisions within the relaxed skin tension lines where possible. The area thus outlined was incised deep to adipose tissue with a #15 scalpel blade.  The skin margins were undermined to an appropriate distance in all directions around the primary defect and laterally outward around the flap utilizing iris scissors. O-T Plasty Text: The defect edges were debeveled with a #15 scalpel blade.  Given the location of the defect, shape of the defect and the proximity to free margins an O-T plasty was deemed most appropriate.  Using a sterile surgical marker, an appropriate O-T plasty was drawn incorporating the defect and placing the expected incisions within the relaxed skin tension lines where possible.    The area thus outlined was incised deep to adipose tissue with a #15 scalpel blade.  The skin margins were undermined to an appropriate distance in all directions utilizing iris scissors. O-Z Plasty Text: The defect edges were debeveled with a #15 scalpel blade.  Given the location of the defect, shape of the defect and the proximity to free margins an O-Z plasty (double transposition flap) was deemed most appropriate.  Using a sterile surgical marker, the appropriate transposition flaps were drawn incorporating the defect and placing the expected incisions within the relaxed skin tension lines where possible.    The area thus outlined was incised deep to adipose tissue with a #15 scalpel blade.  The skin margins were undermined to an appropriate distance in all directions utilizing iris scissors.  Hemostasis was achieved with electrocautery.  The flaps were then transposed into place, one clockwise and the other counterclockwise, and anchored with interrupted buried subcutaneous sutures. Double O-Z Plasty Text: The defect edges were debeveled with a #15 scalpel blade.  Given the location of the defect, shape of the defect and the proximity to free margins a Double O-Z plasty (double transposition flap) was deemed most appropriate.  Using a sterile surgical marker, the appropriate transposition flaps were drawn incorporating the defect and placing the expected incisions within the relaxed skin tension lines where possible. The area thus outlined was incised deep to adipose tissue with a #15 scalpel blade.  The skin margins were undermined to an appropriate distance in all directions utilizing iris scissors.  Hemostasis was achieved with electrocautery.  The flaps were then transposed into place, one clockwise and the other counterclockwise, and anchored with interrupted buried subcutaneous sutures. V-Y Plasty Text: The defect edges were debeveled with a #15 scalpel blade.  Given the location of the defect, shape of the defect and the proximity to free margins an V-Y advancement flap was deemed most appropriate.  Using a sterile surgical marker, an appropriate advancement flap was drawn incorporating the defect and placing the expected incisions within the relaxed skin tension lines where possible.    The area thus outlined was incised deep to adipose tissue with a #15 scalpel blade.  The skin margins were undermined to an appropriate distance in all directions utilizing iris scissors. H Plasty Text: Given the location of the defect, shape of the defect and the proximity to free margins a H-plasty was deemed most appropriate for repair.  Using a sterile surgical marker, the appropriate advancement arms of the H-plasty were drawn incorporating the defect and placing the expected incisions within the relaxed skin tension lines where possible. The area thus outlined was incised deep to adipose tissue with a #15 scalpel blade. The skin margins were undermined to an appropriate distance in all directions utilizing iris scissors.  The opposing advancement arms were then advanced into place in opposite direction and anchored with interrupted buried subcutaneous sutures. W Plasty Text: The lesion was extirpated to the level of the fat with a #15 scalpel blade.  Given the location of the defect, shape of the defect and the proximity to free margins a W-plasty was deemed most appropriate for repair.  Using a sterile surgical marker, the appropriate transposition arms of the W-plasty were drawn incorporating the defect and placing the expected incisions within the relaxed skin tension lines where possible.    The area thus outlined was incised deep to adipose tissue with a #15 scalpel blade.  The skin margins were undermined to an appropriate distance in all directions utilizing iris scissors.  The opposing transposition arms were then transposed into place in opposite direction and anchored with interrupted buried subcutaneous sutures. Z Plasty Text: The lesion was extirpated to the level of the fat with a #15 scalpel blade.  Given the location of the defect, shape of the defect and the proximity to free margins a Z-plasty was deemed most appropriate for repair.  Using a sterile surgical marker, the appropriate transposition arms of the Z-plasty were drawn incorporating the defect and placing the expected incisions within the relaxed skin tension lines where possible.    The area thus outlined was incised deep to adipose tissue with a #15 scalpel blade.  The skin margins were undermined to an appropriate distance in all directions utilizing iris scissors.  The opposing transposition arms were then transposed into place in opposite direction and anchored with interrupted buried subcutaneous sutures. Double Z Plasty Text: The lesion was extirpated to the level of the fat with a #15 scalpel blade. Given the location of the defect, shape of the defect and the proximity to free margins a double Z-plasty was deemed most appropriate for repair. Using a sterile surgical marker, the appropriate transposition arms of the double Z-plasty were drawn incorporating the defect and placing the expected incisions within the relaxed skin tension lines where possible. The area thus outlined was incised deep to adipose tissue with a #15 scalpel blade. The skin margins were undermined to an appropriate distance in all directions utilizing iris scissors. The opposing transposition arms were then transposed and carried over into place in opposite direction and anchored with interrupted buried subcutaneous sutures. Zygomaticofacial Flap Text: Given the location of the defect, shape of the defect and the proximity to free margins a zygomaticofacial flap was deemed most appropriate for repair.  Using a sterile surgical marker, the appropriate flap was drawn incorporating the defect and placing the expected incisions within the relaxed skin tension lines where possible. The area thus outlined was incised deep to adipose tissue with a #15 scalpel blade with preservation of a vascular pedicle.  The skin margins were undermined to an appropriate distance in all directions utilizing iris scissors.  The flap was then placed into the defect and anchored with interrupted buried subcutaneous sutures. Cheek Interpolation Flap Text: A decision was made to reconstruct the defect utilizing an interpolation axial flap and a staged reconstruction.  A telfa template was made of the defect.  This telfa template was then used to outline the Cheek Interpolation flap.  The donor area for the pedicle flap was then injected with anesthesia.  The flap was excised through the skin and subcutaneous tissue down to the layer of the underlying musculature.  The interpolation flap was carefully excised within this deep plane to maintain its blood supply.  The edges of the donor site were undermined.   The donor site was closed in a primary fashion.  The pedicle was then rotated into position and sutured.  Once the tube was sutured into place, adequate blood supply was confirmed with blanching and refill.  The pedicle was then wrapped with xeroform gauze and dressed appropriately with a telfa and gauze bandage to ensure continued blood supply and protect the attached pedicle. Cheek-To-Nose Interpolation Flap Text: A decision was made to reconstruct the defect utilizing an interpolation axial flap and a staged reconstruction.  A telfa template was made of the defect.  This telfa template was then used to outline the Cheek-To-Nose Interpolation flap.  The donor area for the pedicle flap was then injected with anesthesia.  The flap was excised through the skin and subcutaneous tissue down to the layer of the underlying musculature.  The interpolation flap was carefully excised within this deep plane to maintain its blood supply.  The edges of the donor site were undermined.   The donor site was closed in a primary fashion.  The pedicle was then rotated into position and sutured.  Once the tube was sutured into place, adequate blood supply was confirmed with blanching and refill.  The pedicle was then wrapped with xeroform gauze and dressed appropriately with a telfa and gauze bandage to ensure continued blood supply and protect the attached pedicle. Interpolation Flap Text: A decision was made to reconstruct the defect utilizing an interpolation axial flap and a staged reconstruction.  A telfa template was made of the defect.  This telfa template was then used to outline the interpolation flap.  The donor area for the pedicle flap was then injected with anesthesia.  The flap was excised through the skin and subcutaneous tissue down to the layer of the underlying musculature.  The interpolation flap was carefully excised within this deep plane to maintain its blood supply.  The edges of the donor site were undermined.   The donor site was closed in a primary fashion.  The pedicle was then rotated into position and sutured.  Once the tube was sutured into place, adequate blood supply was confirmed with blanching and refill.  The pedicle was then wrapped with xeroform gauze and dressed appropriately with a telfa and gauze bandage to ensure continued blood supply and protect the attached pedicle. Melolabial Interpolation Flap Text: A decision was made to reconstruct the defect utilizing an interpolation axial flap and a staged reconstruction.  A telfa template was made of the defect.  This telfa template was then used to outline the melolabial interpolation flap.  The donor area for the pedicle flap was then injected with anesthesia.  The flap was excised through the skin and subcutaneous tissue down to the layer of the underlying musculature.  The pedicle flap was carefully excised within this deep plane to maintain its blood supply.  The edges of the donor site were undermined.   The donor site was closed in a primary fashion.  The pedicle was then rotated into position and sutured.  Once the tube was sutured into place, adequate blood supply was confirmed with blanching and refill.  The pedicle was then wrapped with xeroform gauze and dressed appropriately with a telfa and gauze bandage to ensure continued blood supply and protect the attached pedicle. Mastoid Interpolation Flap Text: A decision was made to reconstruct the defect utilizing an interpolation axial flap and a staged reconstruction.  A telfa template was made of the defect.  This telfa template was then used to outline the mastoid interpolation flap.  The donor area for the pedicle flap was then injected with anesthesia.  The flap was excised through the skin and subcutaneous tissue down to the layer of the underlying musculature.  The pedicle flap was carefully excised within this deep plane to maintain its blood supply.  The edges of the donor site were undermined.   The donor site was closed in a primary fashion.  The pedicle was then rotated into position and sutured.  Once the tube was sutured into place, adequate blood supply was confirmed with blanching and refill.  The pedicle was then wrapped with xeroform gauze and dressed appropriately with a telfa and gauze bandage to ensure continued blood supply and protect the attached pedicle. Posterior Auricular Interpolation Flap Text: A decision was made to reconstruct the defect utilizing an interpolation axial flap and a staged reconstruction.  A telfa template was made of the defect.  This telfa template was then used to outline the posterior auricular interpolation flap.  The donor area for the pedicle flap was then injected with anesthesia.  The flap was excised through the skin and subcutaneous tissue down to the layer of the underlying musculature.  The pedicle flap was carefully excised within this deep plane to maintain its blood supply.  The edges of the donor site were undermined.   The donor site was closed in a primary fashion.  The pedicle was then rotated into position and sutured.  Once the tube was sutured into place, adequate blood supply was confirmed with blanching and refill.  The pedicle was then wrapped with xeroform gauze and dressed appropriately with a telfa and gauze bandage to ensure continued blood supply and protect the attached pedicle. Paramedian Forehead Flap Text: A decision was made to reconstruct the defect utilizing an interpolation axial flap and a staged reconstruction.  A telfa template was made of the defect.  This telfa template was then used to outline the paramedian forehead pedicle flap.  The donor area for the pedicle flap was then injected with anesthesia.  The flap was excised through the skin and subcutaneous tissue down to the layer of the underlying musculature.  The pedicle flap was carefully excised within this deep plane to maintain its blood supply.  The edges of the donor site were undermined.   The donor site was closed in a primary fashion.  The pedicle was then rotated into position and sutured.  Once the tube was sutured into place, adequate blood supply was confirmed with blanching and refill.  The pedicle was then wrapped with xeroform gauze and dressed appropriately with a telfa and gauze bandage to ensure continued blood supply and protect the attached pedicle. Abbe Flap (Upper To Lower Lip) Text: The defect of the lower lip was assessed and measured.  Given the location and size of the defect, an Abbe flap was deemed most appropriate. Using a sterile surgical marker, an appropriate Abbe flap was measured and drawn on the upper lip. Local anesthesia was then infiltrated.  A scalpel was then used to incise the upper lip through and through the skin, vermilion, muscle and mucosa, leaving the flap pedicled on the opposite side.  The flap was then rotated and transferred to the lower lip defect.  The flap was then sutured into place with a three layer technique, closing the orbicularis oris muscle layer with subcutaneous buried sutures, followed by a mucosal layer and an epidermal layer. Abbe Flap (Lower To Upper Lip) Text: The defect of the upper lip was assessed and measured.  Given the location and size of the defect, an Abbe flap was deemed most appropriate. Using a sterile surgical marker, an appropriate Abbe flap was measured and drawn on the lower lip. Local anesthesia was then infiltrated. A scalpel was then used to incise the upper lip through and through the skin, vermilion, muscle and mucosa, leaving the flap pedicled on the opposite side.  The flap was then rotated and transferred to the lower lip defect.  The flap was then sutured into place with a three layer technique, closing the orbicularis oris muscle layer with subcutaneous buried sutures, followed by a mucosal layer and an epidermal layer. Estlander Flap (Upper To Lower Lip) Text: The defect of the lower lip was assessed and measured.  Given the location and size of the defect, an Estlander flap was deemed most appropriate. Using a sterile surgical marker, an appropriate Estlander flap was measured and drawn on the upper lip. Local anesthesia was then infiltrated. A scalpel was then used to incise the lateral aspect of the flap, through skin, muscle and mucosa, leaving the flap pedicled medially.  The flap was then rotated and positioned to fill the lower lip defect.  The flap was then sutured into place with a three layer technique, closing the orbicularis oris muscle layer with subcutaneous buried sutures, followed by a mucosal layer and an epidermal layer. Lip Wedge Excision Repair Text: Given the location of the defect and the proximity to free margins a full thickness wedge repair was deemed most appropriate.  Using a sterile surgical marker, the appropriate repair was drawn incorporating the defect and placing the expected incisions perpendicular to the vermilion border.  The vermilion border was also meticulously outlined to ensure appropriate reapproximation during the repair.  The area thus outlined was incised through and through with a #15 scalpel blade.  The muscularis and dermis were reaproximated with deep sutures following hemostasis. Care was taken to realign the vermilion border before proceeding with the superficial closure.  Once the vermilion was realigned the superfical and mucosal closure was finished. Ftsg Text: The defect edges were debeveled with a #15 scalpel blade.  Given the location of the defect, shape of the defect and the proximity to free margins a full thickness skin graft was deemed most appropriate.  Using a sterile surgical marker, the primary defect shape was transferred to the donor site. The area thus outlined was incised deep to adipose tissue with a #15 scalpel blade.  The harvested graft was then trimmed of adipose tissue until only dermis and epidermis was left.  The skin margins of the secondary defect were undermined to an appropriate distance in all directions utilizing iris scissors.  The secondary defect was closed with interrupted buried subcutaneous sutures.  The skin edges were then re-apposed with running  sutures.  The skin graft was then placed in the primary defect and oriented appropriately. Split-Thickness Skin Graft Text: The defect edges were debeveled with a #15 scalpel blade.  Given the location of the defect, shape of the defect and the proximity to free margins a split thickness skin graft was deemed most appropriate.  Using a sterile surgical marker, the primary defect shape was transferred to the donor site. The split thickness graft was then harvested.  The skin graft was then placed in the primary defect and oriented appropriately. Pinch Graft Text: The defect edges were debeveled with a #15 scalpel blade. Given the location of the defect, shape of the defect and the proximity to free margins a pinch graft was deemed most appropriate. Using a sterile surgical marker, the primary defect shape was transferred to the donor site. The area thus outlined was incised deep to adipose tissue with a #15 scalpel blade.  The harvested graft was then trimmed of adipose tissue until only dermis and epidermis was left. The skin graft was then placed in the primary defect and oriented appropriately. Burow's Graft Text: The defect edges were debeveled with a #15 scalpel blade.  Given the location of the defect, shape of the defect, the proximity to free margins and the presence of a standing cone deformity a Burow's skin graft was deemed most appropriate. The standing cone was removed and this tissue was then trimmed to the shape of the primary defect. The adipose tissue was also removed until only dermis and epidermis were left.  The skin margins of the secondary defect were undermined to an appropriate distance in all directions utilizing iris scissors.  The secondary defect was closed with interrupted buried subcutaneous sutures.  The skin edges were then re-apposed with running  sutures.  The skin graft was then placed in the primary defect and oriented appropriately. Cartilage Graft Text: The defect edges were debeveled with a #15 scalpel blade.  Given the location of the defect, shape of the defect, the fact the defect involved a full thickness cartilage defect a cartilage graft was deemed most appropriate.  An appropriate donor site was identified, cleansed, and anesthetized. The cartilage graft was then harvested and transferred to the recipient site, oriented appropriately and then sutured into place.  The secondary defect was then repaired using a primary closure. Composite Graft Text: The defect edges were debeveled with a #15 scalpel blade.  Given the location of the defect, shape of the defect, the proximity to free margins and the fact the defect was full thickness a composite graft was deemed most appropriate.  The defect was outline and then transferred to the donor site.  A full thickness graft was then excised from the donor site. The graft was then placed in the primary defect, oriented appropriately and then sutured into place.  The secondary defect was then repaired using a primary closure. Epidermal Autograft Text: The defect edges were debeveled with a #15 scalpel blade.  Given the location of the defect, shape of the defect and the proximity to free margins an epidermal autograft was deemed most appropriate.  Using a sterile surgical marker, the primary defect shape was transferred to the donor site. The epidermal graft was then harvested.  The skin graft was then placed in the primary defect and oriented appropriately. Dermal Autograft Text: The defect edges were debeveled with a #15 scalpel blade.  Given the location of the defect, shape of the defect and the proximity to free margins a dermal autograft was deemed most appropriate.  Using a sterile surgical marker, the primary defect shape was transferred to the donor site. The area thus outlined was incised deep to adipose tissue with a #15 scalpel blade.  The harvested graft was then trimmed of adipose and epidermal tissue until only dermis was left.  The skin graft was then placed in the primary defect and oriented appropriately. Skin Substitute Text: The defect edges were debeveled with a #15 scalpel blade.  Given the location of the defect, shape of the defect and the proximity to free margins a skin substitute graft was deemed most appropriate.  The graft material was trimmed to fit the size of the defect. The graft was then placed in the primary defect and oriented appropriately. Tissue Cultured Epidermal Autograft Text: The defect edges were debeveled with a #15 scalpel blade.  Given the location of the defect, shape of the defect and the proximity to free margins a tissue cultured epidermal autograft was deemed most appropriate.  The graft was then trimmed to fit the size of the defect.  The graft was then placed in the primary defect and oriented appropriately. Xenograft Text: The defect edges were debeveled with a #15 scalpel blade.  Given the location of the defect, shape of the defect and the proximity to free margins a xenograft was deemed most appropriate.  The graft was then trimmed to fit the size of the defect.  The graft was then placed in the primary defect and oriented appropriately. Purse String (Intermediate) Text: Given the location of the defect and the characteristics of the surrounding skin a purse string intermediate closure was deemed most appropriate.  Undermining was performed circumferentially around the surgical defect.  A purse string suture was then placed and tightened. Purse String (Simple) Text: Given the location of the defect and the characteristics of the surrounding skin a purse string simple closure was deemed most appropriate.  Undermining was performed circumferentially around the surgical defect.  A purse string suture was then placed and tightened. Complex Repair And Single Advancement Flap Text: The defect edges were debeveled with a #15 scalpel blade.  The primary defect was closed partially with a complex linear closure.  Given the location of the remaining defect, shape of the defect and the proximity to free margins a single advancement flap was deemed most appropriate for complete closure of the defect.  Using a sterile surgical marker, an appropriate advancement flap was drawn incorporating the defect and placing the expected incisions within the relaxed skin tension lines where possible.    The area thus outlined was incised deep to adipose tissue with a #15 scalpel blade.  The skin margins were undermined to an appropriate distance in all directions utilizing iris scissors. Complex Repair And Double Advancement Flap Text: The defect edges were debeveled with a #15 scalpel blade.  The primary defect was closed partially with a complex linear closure.  Given the location of the remaining defect, shape of the defect and the proximity to free margins a double advancement flap was deemed most appropriate for complete closure of the defect.  Using a sterile surgical marker, an appropriate advancement flap was drawn incorporating the defect and placing the expected incisions within the relaxed skin tension lines where possible.    The area thus outlined was incised deep to adipose tissue with a #15 scalpel blade.  The skin margins were undermined to an appropriate distance in all directions utilizing iris scissors. Complex Repair And Modified Advancement Flap Text: The defect edges were debeveled with a #15 scalpel blade.  The primary defect was closed partially with a complex linear closure.  Given the location of the remaining defect, shape of the defect and the proximity to free margins a modified advancement flap was deemed most appropriate for complete closure of the defect.  Using a sterile surgical marker, an appropriate advancement flap was drawn incorporating the defect and placing the expected incisions within the relaxed skin tension lines where possible.    The area thus outlined was incised deep to adipose tissue with a #15 scalpel blade.  The skin margins were undermined to an appropriate distance in all directions utilizing iris scissors. Complex Repair And A-T Advancement Flap Text: The defect edges were debeveled with a #15 scalpel blade.  The primary defect was closed partially with a complex linear closure.  Given the location of the remaining defect, shape of the defect and the proximity to free margins an A-T advancement flap was deemed most appropriate for complete closure of the defect.  Using a sterile surgical marker, an appropriate advancement flap was drawn incorporating the defect and placing the expected incisions within the relaxed skin tension lines where possible.    The area thus outlined was incised deep to adipose tissue with a #15 scalpel blade.  The skin margins were undermined to an appropriate distance in all directions utilizing iris scissors. Complex Repair And O-T Advancement Flap Text: The defect edges were debeveled with a #15 scalpel blade.  The primary defect was closed partially with a complex linear closure.  Given the location of the remaining defect, shape of the defect and the proximity to free margins an O-T advancement flap was deemed most appropriate for complete closure of the defect.  Using a sterile surgical marker, an appropriate advancement flap was drawn incorporating the defect and placing the expected incisions within the relaxed skin tension lines where possible.    The area thus outlined was incised deep to adipose tissue with a #15 scalpel blade.  The skin margins were undermined to an appropriate distance in all directions utilizing iris scissors. Complex Repair And O-L Flap Text: The defect edges were debeveled with a #15 scalpel blade.  The primary defect was closed partially with a complex linear closure.  Given the location of the remaining defect, shape of the defect and the proximity to free margins an O-L flap was deemed most appropriate for complete closure of the defect.  Using a sterile surgical marker, an appropriate flap was drawn incorporating the defect and placing the expected incisions within the relaxed skin tension lines where possible.    The area thus outlined was incised deep to adipose tissue with a #15 scalpel blade.  The skin margins were undermined to an appropriate distance in all directions utilizing iris scissors. Complex Repair And Bilobe Flap Text: The defect edges were debeveled with a #15 scalpel blade.  The primary defect was closed partially with a complex linear closure.  Given the location of the remaining defect, shape of the defect and the proximity to free margins a bilobe flap was deemed most appropriate for complete closure of the defect.  Using a sterile surgical marker, an appropriate advancement flap was drawn incorporating the defect and placing the expected incisions within the relaxed skin tension lines where possible.    The area thus outlined was incised deep to adipose tissue with a #15 scalpel blade.  The skin margins were undermined to an appropriate distance in all directions utilizing iris scissors. Complex Repair And Melolabial Flap Text: The defect edges were debeveled with a #15 scalpel blade.  The primary defect was closed partially with a complex linear closure.  Given the location of the remaining defect, shape of the defect and the proximity to free margins a melolabial flap was deemed most appropriate for complete closure of the defect.  Using a sterile surgical marker, an appropriate advancement flap was drawn incorporating the defect and placing the expected incisions within the relaxed skin tension lines where possible.    The area thus outlined was incised deep to adipose tissue with a #15 scalpel blade.  The skin margins were undermined to an appropriate distance in all directions utilizing iris scissors. Complex Repair And Rotation Flap Text: The defect edges were debeveled with a #15 scalpel blade.  The primary defect was closed partially with a complex linear closure.  Given the location of the remaining defect, shape of the defect and the proximity to free margins a rotation flap was deemed most appropriate for complete closure of the defect.  Using a sterile surgical marker, an appropriate advancement flap was drawn incorporating the defect and placing the expected incisions within the relaxed skin tension lines where possible.    The area thus outlined was incised deep to adipose tissue with a #15 scalpel blade.  The skin margins were undermined to an appropriate distance in all directions utilizing iris scissors. Complex Repair And Rhombic Flap Text: The defect edges were debeveled with a #15 scalpel blade.  The primary defect was closed partially with a complex linear closure.  Given the location of the remaining defect, shape of the defect and the proximity to free margins a rhombic flap was deemed most appropriate for complete closure of the defect.  Using a sterile surgical marker, an appropriate advancement flap was drawn incorporating the defect and placing the expected incisions within the relaxed skin tension lines where possible.    The area thus outlined was incised deep to adipose tissue with a #15 scalpel blade.  The skin margins were undermined to an appropriate distance in all directions utilizing iris scissors. Complex Repair And Transposition Flap Text: The defect edges were debeveled with a #15 scalpel blade.  The primary defect was closed partially with a complex linear closure.  Given the location of the remaining defect, shape of the defect and the proximity to free margins a transposition flap was deemed most appropriate for complete closure of the defect.  Using a sterile surgical marker, an appropriate advancement flap was drawn incorporating the defect and placing the expected incisions within the relaxed skin tension lines where possible.    The area thus outlined was incised deep to adipose tissue with a #15 scalpel blade.  The skin margins were undermined to an appropriate distance in all directions utilizing iris scissors. Complex Repair And V-Y Plasty Text: The defect edges were debeveled with a #15 scalpel blade.  The primary defect was closed partially with a complex linear closure.  Given the location of the remaining defect, shape of the defect and the proximity to free margins a V-Y plasty was deemed most appropriate for complete closure of the defect.  Using a sterile surgical marker, an appropriate advancement flap was drawn incorporating the defect and placing the expected incisions within the relaxed skin tension lines where possible.    The area thus outlined was incised deep to adipose tissue with a #15 scalpel blade.  The skin margins were undermined to an appropriate distance in all directions utilizing iris scissors. Complex Repair And M Plasty Text: The defect edges were debeveled with a #15 scalpel blade.  The primary defect was closed partially with a complex linear closure.  Given the location of the remaining defect, shape of the defect and the proximity to free margins an M plasty was deemed most appropriate for complete closure of the defect.  Using a sterile surgical marker, an appropriate advancement flap was drawn incorporating the defect and placing the expected incisions within the relaxed skin tension lines where possible.    The area thus outlined was incised deep to adipose tissue with a #15 scalpel blade.  The skin margins were undermined to an appropriate distance in all directions utilizing iris scissors. Complex Repair And Double M Plasty Text: The defect edges were debeveled with a #15 scalpel blade.  The primary defect was closed partially with a complex linear closure.  Given the location of the remaining defect, shape of the defect and the proximity to free margins a double M plasty was deemed most appropriate for complete closure of the defect.  Using a sterile surgical marker, an appropriate advancement flap was drawn incorporating the defect and placing the expected incisions within the relaxed skin tension lines where possible.    The area thus outlined was incised deep to adipose tissue with a #15 scalpel blade.  The skin margins were undermined to an appropriate distance in all directions utilizing iris scissors. Complex Repair And W Plasty Text: The defect edges were debeveled with a #15 scalpel blade.  The primary defect was closed partially with a complex linear closure.  Given the location of the remaining defect, shape of the defect and the proximity to free margins a W plasty was deemed most appropriate for complete closure of the defect.  Using a sterile surgical marker, an appropriate advancement flap was drawn incorporating the defect and placing the expected incisions within the relaxed skin tension lines where possible.    The area thus outlined was incised deep to adipose tissue with a #15 scalpel blade.  The skin margins were undermined to an appropriate distance in all directions utilizing iris scissors. Complex Repair And Z Plasty Text: The defect edges were debeveled with a #15 scalpel blade.  The primary defect was closed partially with a complex linear closure.  Given the location of the remaining defect, shape of the defect and the proximity to free margins a Z plasty was deemed most appropriate for complete closure of the defect.  Using a sterile surgical marker, an appropriate advancement flap was drawn incorporating the defect and placing the expected incisions within the relaxed skin tension lines where possible.    The area thus outlined was incised deep to adipose tissue with a #15 scalpel blade.  The skin margins were undermined to an appropriate distance in all directions utilizing iris scissors. Complex Repair And Dorsal Nasal Flap Text: The defect edges were debeveled with a #15 scalpel blade.  The primary defect was closed partially with a complex linear closure.  Given the location of the remaining defect, shape of the defect and the proximity to free margins a dorsal nasal flap was deemed most appropriate for complete closure of the defect.  Using a sterile surgical marker, an appropriate flap was drawn incorporating the defect and placing the expected incisions within the relaxed skin tension lines where possible.    The area thus outlined was incised deep to adipose tissue with a #15 scalpel blade.  The skin margins were undermined to an appropriate distance in all directions utilizing iris scissors. Complex Repair And Ftsg Text: The defect edges were debeveled with a #15 scalpel blade.  The primary defect was closed partially with a complex linear closure.  Given the location of the defect, shape of the defect and the proximity to free margins a full thickness skin graft was deemed most appropriate to repair the remaining defect.  The graft was trimmed to fit the size of the remaining defect.  The graft was then placed in the primary defect, oriented appropriately, and sutured into place. Complex Repair And Burow's Graft Text: The defect edges were debeveled with a #15 scalpel blade.  The primary defect was closed partially with a complex linear closure.  Given the location of the defect, shape of the defect, the proximity to free margins and the presence of a standing cone deformity a Burow's graft was deemed most appropriate to repair the remaining defect.  The graft was trimmed to fit the size of the remaining defect.  The graft was then placed in the primary defect, oriented appropriately, and sutured into place. Complex Repair And Split-Thickness Skin Graft Text: The defect edges were debeveled with a #15 scalpel blade.  The primary defect was closed partially with a complex linear closure.  Given the location of the defect, shape of the defect and the proximity to free margins a split thickness skin graft was deemed most appropriate to repair the remaining defect.  The graft was trimmed to fit the size of the remaining defect.  The graft was then placed in the primary defect, oriented appropriately, and sutured into place. Complex Repair And Epidermal Autograft Text: The defect edges were debeveled with a #15 scalpel blade.  The primary defect was closed partially with a complex linear closure.  Given the location of the defect, shape of the defect and the proximity to free margins an epidermal autograft was deemed most appropriate to repair the remaining defect.  The graft was trimmed to fit the size of the remaining defect.  The graft was then placed in the primary defect, oriented appropriately, and sutured into place. Complex Repair And Dermal Autograft Text: The defect edges were debeveled with a #15 scalpel blade.  The primary defect was closed partially with a complex linear closure.  Given the location of the defect, shape of the defect and the proximity to free margins an dermal autograft was deemed most appropriate to repair the remaining defect.  The graft was trimmed to fit the size of the remaining defect.  The graft was then placed in the primary defect, oriented appropriately, and sutured into place. Complex Repair And Tissue Cultured Epidermal Autograft Text: The defect edges were debeveled with a #15 scalpel blade.  The primary defect was closed partially with a complex linear closure.  Given the location of the defect, shape of the defect and the proximity to free margins an tissue cultured epidermal autograft was deemed most appropriate to repair the remaining defect.  The graft was trimmed to fit the size of the remaining defect.  The graft was then placed in the primary defect, oriented appropriately, and sutured into place. Complex Repair And Xenograft Text: The defect edges were debeveled with a #15 scalpel blade.  The primary defect was closed partially with a complex linear closure.  Given the location of the defect, shape of the defect and the proximity to free margins a xenograft was deemed most appropriate to repair the remaining defect.  The graft was trimmed to fit the size of the remaining defect.  The graft was then placed in the primary defect, oriented appropriately, and sutured into place. Complex Repair And Skin Substitute Graft Text: The defect edges were debeveled with a #15 scalpel blade.  The primary defect was closed partially with a complex linear closure.  Given the location of the remaining defect, shape of the defect and the proximity to free margins a skin substitute graft was deemed most appropriate to repair the remaining defect.  The graft was trimmed to fit the size of the remaining defect.  The graft was then placed in the primary defect, oriented appropriately, and sutured into place. Path Notes (To The Dermatopathologist): Please check margins. Consent was obtained from the patient. The risks and benefits to therapy were discussed in detail. Specifically, the risks of infection, scarring, bleeding, prolonged wound healing, incomplete removal, allergy to anesthesia, nerve injury and recurrence were addressed. Prior to the procedure, the treatment site was clearly identified and confirmed by the patient. All components of Universal Protocol/PAUSE Rule completed. Post-Care Instructions: I reviewed with the patient in detail post-care instructions. Patient is not to engage in any heavy lifting, exercise, or swimming for the next 14 days. Should the patient develop any fevers, chills, bleeding, severe pain patient will contact the office immediately. Home Suture Removal Text: Patient was provided a home suture removal kit and will remove their sutures at home.  If they have any questions or difficulties they will call the office. Where Do You Want The Question To Include Opioid Counseling Located?: Case Summary Tab Billing Type: Third-Party Bill Information: Selecting Yes will display possible errors in your note based on the variables you have selected. This validation is only offered as a suggestion for you. PLEASE NOTE THAT THE VALIDATION TEXT WILL BE REMOVED WHEN YOU FINALIZE YOUR NOTE. IF YOU WANT TO FAX A PRELIMINARY NOTE YOU WILL NEED TO TOGGLE THIS TO 'NO' IF YOU DO NOT WANT IT IN YOUR FAXED NOTE.

## 2024-10-24 ENCOUNTER — OUTPATIENT (OUTPATIENT)
Dept: OUTPATIENT SERVICES | Facility: HOSPITAL | Age: 59
LOS: 1 days | End: 2024-10-24

## 2024-10-24 ENCOUNTER — APPOINTMENT (OUTPATIENT)
Dept: INFECTIOUS DISEASE | Facility: CLINIC | Age: 59
End: 2024-10-24

## 2024-10-24 DIAGNOSIS — Z98.890 OTHER SPECIFIED POSTPROCEDURAL STATES: Chronic | ICD-10-CM

## 2024-10-24 DIAGNOSIS — Z96.0 PRESENCE OF UROGENITAL IMPLANTS: Chronic | ICD-10-CM

## 2024-10-24 DIAGNOSIS — Z93.3 COLOSTOMY STATUS: Chronic | ICD-10-CM

## 2024-10-24 PROCEDURE — 99215 OFFICE O/P EST HI 40 MIN: CPT | Mod: 95

## 2024-11-08 ENCOUNTER — RESULT REVIEW (OUTPATIENT)
Age: 59
End: 2024-11-08

## 2024-11-08 ENCOUNTER — OUTPATIENT (OUTPATIENT)
Dept: OUTPATIENT SERVICES | Facility: HOSPITAL | Age: 59
LOS: 1 days | Discharge: HOME | End: 2024-11-08
Payer: MEDICAID

## 2024-11-08 ENCOUNTER — TRANSCRIPTION ENCOUNTER (OUTPATIENT)
Age: 59
End: 2024-11-08

## 2024-11-08 DIAGNOSIS — K62.9 DISEASE OF ANUS AND RECTUM, UNSPECIFIED: Chronic | ICD-10-CM

## 2024-11-08 DIAGNOSIS — Z34.90 ENCOUNTER FOR SUPERVISION OF NORMAL PREGNANCY, UNSPECIFIED, UNSPECIFIED TRIMESTER: ICD-10-CM

## 2024-11-08 DIAGNOSIS — Z98.890 OTHER SPECIFIED POSTPROCEDURAL STATES: Chronic | ICD-10-CM

## 2024-11-08 DIAGNOSIS — Z96.0 PRESENCE OF UROGENITAL IMPLANTS: Chronic | ICD-10-CM

## 2024-11-08 DIAGNOSIS — Z93.3 COLOSTOMY STATUS: Chronic | ICD-10-CM

## 2024-11-08 PROCEDURE — 50435 EXCHANGE NEPHROSTOMY CATH: CPT | Mod: 50

## 2024-11-08 NOTE — ASU DISCHARGE PLAN (ADULT/PEDIATRIC) - FINANCIAL ASSISTANCE
[facility] provides services at a reduced cost to those who are determined to be eligible through [facility]’s financial assistance program. Information regarding [facility]’s financial assistance program can be found by going to [email or web address] or by calling [phone].

## 2024-11-08 NOTE — ASU DISCHARGE PLAN (ADULT/PEDIATRIC) - ASU DC SPECIAL INSTRUCTIONSFT
Nephrostomy Exchange    Discharge Instructions  - You have had your nephrostomy tubes exchanged.  - Keep the area clean and dry.  - Do not soak in a tub or pool with the drain, however you may shower with the drain and dressing covered in plastic wrap.  - Do not put traction on the drain and be careful that the drain does not get accidentally dislodged or kinked.  - Record output daily from the drain. Empty the bag as needed.  - You may resume your normal diet.  - You may resume your normal medications.  - It is normal to experience some pain over the site for the next few days. You may take apply ice to the area (20 minutes on, 20 minutes off) and take Tylenol for that pain. Do not take more frequently than every 6 hours and do not exceed more than 3000mg of Tylenol in a 24 hour period.    Notify your primary physician and/or Interventional Radiology IMMEDIATELY if you experience any of the following       - Fever of 100.4F  or 38C       - Chills or Rigors/ Shakes       - Swelling and/or Redness in the area of the puncture site       - Worsening Pain       - Blood soaked bandages or worsening bleeding       - Lightheadedness and/or dizziness upon standing       - Chest Pain/ Tightness       - Shortness of Breath       - Difficulty walking    If you have a problem that you believe requires IMMEDIATE attention, please go to your NEAREST Emergency Room. If you believe your problem can safely wait until you speak to a physician, please call Interventional Radiology for any concerns.    During Normal Weekday Business Hours- You can contact the Interventional Radiology department during normal business hours via telephone.  During Evenings and Weekends- If you need to contact Interventional Radiology during off hours, do so by calling the hospital and requesting to be connected to the Interventional Radiologist on call.

## 2024-11-08 NOTE — PRE PROCEDURE NOTE - PRE PROCEDURE EVALUATION
Interventional Radiology    HPI: 59y Male with chronic PCNs presents for routine exchange.     Allergies: No Known Allergies    Medications (Abx/Cardiac/Anticoagulation/Blood Products)      Data:    T(C): --  HR: --  BP: --  RR: --  SpO2: --    Exam  General: No acute distress  Chest: Non labored breathing  Abdomen: Non-distended  Extremities: No swelling, warm          Imaging:     Plan: 59y Male presents for bilateral PCN exchange  -Risks/Benefits/alternatives explained with the patient and/or healthcare proxy and witnessed informed consent obtained.

## 2024-11-08 NOTE — ASU DISCHARGE PLAN (ADULT/PEDIATRIC) - FREQUENT HAND WASHING PREVENTS THE SPREAD OF INFECTION.
Detail Level: Detailed Depth Of Biopsy: dermis Was A Bandage Applied: Yes Size Of Lesion In Cm: 0 Biopsy Type: H and E Biopsy Method: double edge Personna blade Anesthesia Type: 1% lidocaine with epinephrine Anesthesia Volume In Cc (Will Not Render If 0): 0.5 Hemostasis: Aluminum Chloride and Electrocautery Statement Selected Wound Care: Vaseline Dressing: bandage Destruction After The Procedure: No Type Of Destruction Used: Curettage Curettage Text: The wound bed was treated with curettage after the biopsy was performed. Cryotherapy Text: The wound bed was treated with cryotherapy after the biopsy was performed. Electrodesiccation Text: The wound bed was treated with electrodesiccation after the biopsy was performed. Electrodesiccation And Curettage Text: The wound bed was treated with electrodesiccation and curettage after the biopsy was performed. Silver Nitrate Text: The wound bed was treated with silver nitrate after the biopsy was performed. Lab: 6 Consent: Written consent was obtained and risks were reviewed including but not limited to scarring, infection, bleeding, scabbing, incomplete removal, nerve damage and allergy to anesthesia. Post-Care Instructions: I reviewed with the patient in detail post-care instructions. Patient is to keep the biopsy site dry overnight, and then apply bacitracin twice daily until healed. Patient may apply hydrogen peroxide soaks to remove any crusting. Notification Instructions: Patient will be notified of biopsy results. However, patient instructed to call the office if not contacted within 2 weeks. Billing Type: Third-Party Bill Information: Selecting Yes will display possible errors in your note based on the variables you have selected. This validation is only offered as a suggestion for you. PLEASE NOTE THAT THE VALIDATION TEXT WILL BE REMOVED WHEN YOU FINALIZE YOUR NOTE. IF YOU WANT TO FAX A PRELIMINARY NOTE YOU WILL NEED TO TOGGLE THIS TO 'NO' IF YOU DO NOT WANT IT IN YOUR FAXED NOTE.

## 2024-11-15 ENCOUNTER — APPOINTMENT (OUTPATIENT)
Dept: PODIATRY | Facility: CLINIC | Age: 59
End: 2024-11-15

## 2024-11-21 ENCOUNTER — APPOINTMENT (OUTPATIENT)
Dept: INFECTIOUS DISEASE | Facility: CLINIC | Age: 59
End: 2024-11-21
Payer: MEDICAID

## 2024-11-21 ENCOUNTER — OUTPATIENT (OUTPATIENT)
Dept: OUTPATIENT SERVICES | Facility: HOSPITAL | Age: 59
LOS: 1 days | End: 2024-11-21
Payer: MEDICAID

## 2024-11-21 DIAGNOSIS — F41.9 ANXIETY DISORDER, UNSPECIFIED: ICD-10-CM

## 2024-11-21 DIAGNOSIS — Z93.3 COLOSTOMY STATUS: Chronic | ICD-10-CM

## 2024-11-21 DIAGNOSIS — F33.1 MAJOR DEPRESSIVE DISORDER, RECURRENT, MODERATE: ICD-10-CM

## 2024-11-21 DIAGNOSIS — Z96.0 PRESENCE OF UROGENITAL IMPLANTS: Chronic | ICD-10-CM

## 2024-11-21 DIAGNOSIS — B20 HUMAN IMMUNODEFICIENCY VIRUS [HIV] DISEASE: ICD-10-CM

## 2024-11-21 PROCEDURE — G0463: CPT

## 2024-11-21 PROCEDURE — 99212 OFFICE O/P EST SF 10 MIN: CPT | Mod: 95

## 2024-11-21 RX ORDER — BUPROPION HYDROCHLORIDE 150 MG/1
150 TABLET, EXTENDED RELEASE ORAL DAILY
Qty: 30 | Refills: 3 | Status: ACTIVE | COMMUNITY
Start: 2024-11-21 | End: 1900-01-01

## 2024-11-25 DIAGNOSIS — F33.1 MAJOR DEPRESSIVE DISORDER, RECURRENT, MODERATE: ICD-10-CM

## 2024-11-27 NOTE — DISCHARGE NOTE NURSING/CASE MANAGEMENT/SOCIAL WORK - NSCORESITESY/N_GEN_A_CORE_RD
Chief Complaint   Patient presents with    Office Visit     Pt will have LEFT TOTAL HIP ARTHROPLASTY ANTERIOR APPROACH  on 12/02/2024 with Dr. Hinds.    Pre-Op Exam    Refill Request       HPI  History of Present Illness  The patient presents for evaluation of multiple medical concerns.    She is scheduled for hip surgery on 12/02/2024, to be performed by Dr. Hinds. She has been engaging in stretching exercises in preparation for the procedure. She has been cleared for surgery by Dr. George. She plans to discontinue all medications one week prior to her surgery, except for her blood pressure medication. She has been advised to stop all vitamins and supplements, including vitamin D, one week before surgery. She does not take any anti-inflammatories such as ibuprofen, Motrin, or Aleve, but uses Tylenol for pain relief when she anticipates increased activity.    She reports some swelling in her ankles, more pronounced on the side of her previous hip surgery, but does not experience any calf pain. She is currently taking furosemide 20 mg, which she finds helpful. She also takes Synthroid 37.5 mcg, half a tablet daily, and requests a refill.    She received her influenza vaccine on Monday and has since experienced a scratchy throat, which she suspects may be related to the vaccine. She reports no difficulty swallowing and no cough or wheezing.    She has been experiencing discomfort in her ear and requests an examination. She admits to occasionally using a clean kee pin to scratch her ears due to persistent itching.    She is unable to sleep on her side due to hip soreness and has been sleeping on her back. Occasionally, she wakes up with stiffness in her hand, which resolves quickly. She does not experience any numbness or difficulty making a fist. She also experiences leg cramps at night and has started taking magnesium supplements, suspecting that her omeprazole medication may be depleting her magnesium  No levels.    ALLERGIES  She is allergic to SOYBEAN and CODEINE.    Past Medical History:   Diagnosis Date    Acquired hypothyroidism 2019    Anxiety disorder     Autonomic dysfunction     Essential (primary) hypertension     GERD (gastroesophageal reflux disease) 2019    Hematuria, microscopic 2019    Hyperlipidemia 2020    Hypertension 2019    Hypokalemia     Laryngopharyngeal reflux     Macular degeneration 2019    Motion sickness     Osteoarthritis     Osteomyelitis (CMD)     Osteoporosis 2019    Palpitations     PONV (postoperative nausea and vomiting)     Pre-syncope     Thyroid disease     Trochanteric bursitis     Vaginal atrophy     Vitamin D deficiency        Past Surgical History:   Procedure Laterality Date    Cataract extrac w/ intraocular lens imp&ant vit,bilaterl Bilateral     Colonoscopy  2017    DR Lopez no repeat needed    Tonsillectomy      Total hip replacement Right 2024    Upper gastrointestinal endoscopy  2018    DR Spicer       Social History     Socioeconomic History    Marital status: /Civil Union     Spouse name: Not on file    Number of children: 0    Years of education: Not on file    Highest education level: Not on file   Occupational History    Occupation: Retired teacher /   Tobacco Use    Smoking status: Former     Current packs/day: 0.00     Types: Cigarettes     Quit date: 1965     Years since quittin.9    Smokeless tobacco: Never   Vaping Use    Vaping status: never used   Substance and Sexual Activity    Alcohol use: No    Drug use: Never    Sexual activity: Yes   Other Topics Concern    Not on file   Social History Narrative    Full code ingris Ricky is her decision maker if pt unable     x 42 yr    Just restarted walking with sps for exercise     Social Determinants of Health     Financial Resource Strain: Not on file   Food Insecurity: Not on file   Transportation Needs: Not on file    Physical Activity: Insufficiently Active (5/19/2020)    Exercise Vital Sign     Days of Exercise per Week: 2 days     Minutes of Exercise per Session: 50 min   Stress: Medium Risk (3/30/2021)    Stress     How Stressed: Not on file   Social Connections: Unknown (3/9/2021)    [facility]    Social Connections     Conversations with friends/family/neighbors per week: Not on file   Interpersonal Safety: Not on file (9/11/2023)       ALLERGIES:   Allergen Reactions    Codeine Other (See Comments)     Passed out once after taking Codeine following tooth extraction     Soy Allergy   (Food Or Med) Runny Nose    Soybean Allergy   (Food Or Med) Other (See Comments)     Unknown       Current Outpatient Medications   Medication Sig Dispense Refill    ofloxacin (FLOXIN) 0.3 % otic solution Place 5 drops into both ears daily. 5 mL 0    levothyroxine 75 MCG tablet Take 0.5 tablets by mouth daily. 45 tablet 3    carvedilol (COREG) 6.25 MG tablet Take 1 tablet by mouth in the morning and 1 tablet in the evening. Take with meals. 60 tablet 11    potassium chloride (KLOR-CON) 10 MEQ ER tablet Take 1 tablet by mouth in the morning and 1 tablet in the evening. 180 tablet 0    metoPROLOL succinate (TOPROL-XL) 50 MG 24 hr tablet Take 1 tablet by mouth in the morning and 1 tablet in the evening. (Patient not taking: Reported on 11/21/2024) 180 tablet 0    irbesartan (AVAPRO) 300 MG tablet Take 1 tablet by mouth daily. 90 tablet 0    atorvastatin (LIPITOR) 80 MG tablet Take 1 tablet by mouth daily. 90 tablet 3    furosemide (LASIX) 20 MG tablet Take 1 tablet by mouth daily. 90 tablet 0    ezetimibe (ZETIA) 10 MG tablet Take 1 tablet by mouth daily. 90 tablet 3    CYANOCOBALAMIN PO Take by mouth daily.      MAGNESIUM OXIDE PO Take by mouth daily.      NON FORMULARY daily. Healthy Hair, Skin and Nails      NON FORMULARY daily. Eye vitamin      NON FORMULARY every 7 days.  Allergy injection      acetaminophen (TYLENOL) 325 MG tablet Take 650 mg by mouth every 4 hours as needed for Pain.      minoxidil (LONITEN) 2.5 MG tablet Take 1 tablet by mouth every evening. 90 tablet 1    Triamcinolone Acetonide (NASACORT ALLERGY 24HR NA) Spray in each nostril daily.      cetirizine (ZyrTEC) 10 MG tablet Take 10 mg by mouth every evening.      fluticasone-umeclidin-vilanterol (TRELEGY ELLIPTA) 100-62.5-25 MCG/ACT inhaler Inhale 1 puff into the lungs daily.      omeprazole (PrilOSEC) 20 MG capsule Take 20 mg by mouth daily.      aflibercept (EYLEA) 2 MG/0.05ML Solution injection 2 mg by Intravitreal route every 12 weeks.      Omega-3 Fatty Acids (OMEGA 3 PO) Take by mouth daily.      finasteride (PROSCAR) 5 MG tablet Take 5 mg by mouth nightly.      B Complex Vitamins (B COMPLEX PO) Take 1 tablet by mouth daily. On/off      Cholecalciferol (VITAMIN D) 2000 units tablet Take 1 tablet by mouth daily.       No current facility-administered medications for this visit.       Review of Systems   HENT:  Positive for ear discharge and ear pain.    Musculoskeletal:  Positive for arthralgias.   All other systems reviewed and are negative.      Physical Exam  Vitals and nursing note reviewed.   Constitutional:       Appearance: She is well-developed.   HENT:      Head: Normocephalic and atraumatic.      Right Ear: External ear normal.      Left Ear: External ear normal.      Ears:      Comments: Otitis externa bilat.     Nose: Nose normal.   Eyes:      Conjunctiva/sclera: Conjunctivae normal.      Pupils: Pupils are equal, round, and reactive to light.   Cardiovascular:      Rate and Rhythm: Normal rate and regular rhythm.      Heart sounds: Normal heart sounds.   Pulmonary:      Effort: Pulmonary effort is normal.      Breath sounds: Normal breath sounds.   Abdominal:      General: Bowel sounds are normal.      Palpations: Abdomen is soft.   Musculoskeletal:      Cervical back: Normal range of motion and  neck supple.      Comments: LROM left hip and knee    Skin:     General: Skin is warm and dry.   Neurological:      Mental Status: She is alert and oriented to person, place, and time.      Deep Tendon Reflexes: Reflexes are normal and symmetric.   Psychiatric:         Behavior: Behavior normal.         Thought Content: Thought content normal.         Judgment: Judgment normal.       Physical Exam      Visit Vitals  BP (!) 145/73   Pulse 83   Temp 96.9 °F (36.1 °C) (Temporal)   Ht 5' 1.5\" (1.562 m)   Wt 71.7 kg (158 lb)   SpO2 97%   BMI 29.37 kg/m²     Results        Assessment & Plan  1. Preoperative clearance. Chronic left hip pain.  Cardiology clearance completed by Dr. George.  Preop labs reviewed (CBC, CMP, MRSA swab, UA), all normal.  She was advised to discontinue all vitamins and supplements one week prior to her surgery.  Medically stable for hip surgery.    2. Ear discomfort.  A prescription for ofloxacin ear drops, to be used as 5 drops once daily for 5 days, was provided.    3. Hand stiffness.  The stiffness is likely due to pressure on the cervical spine from sleeping in a fixed position. Gentle stretching exercises. Consider PT/OT    4. Leg cramps.  She was advised to take magnesium supplements to help with leg cramps. PT postop pending.      Total time spent with the patient 30 minutes, more than half used for counseling and disease management    Kristine Mcrae MD

## 2024-12-02 ENCOUNTER — APPOINTMENT (OUTPATIENT)
Dept: INTERNAL MEDICINE | Facility: CLINIC | Age: 59
End: 2024-12-02
Payer: MEDICAID

## 2024-12-02 VITALS
DIASTOLIC BLOOD PRESSURE: 79 MMHG | WEIGHT: 205.5 LBS | BODY MASS INDEX: 28.77 KG/M2 | SYSTOLIC BLOOD PRESSURE: 116 MMHG | OXYGEN SATURATION: 96 % | HEIGHT: 71 IN | TEMPERATURE: 95.6 F | HEART RATE: 79 BPM

## 2024-12-02 DIAGNOSIS — N50.89 OTHER SPECIFIED DISORDERS OF THE MALE GENITAL ORGANS: ICD-10-CM

## 2024-12-02 PROCEDURE — 99213 OFFICE O/P EST LOW 20 MIN: CPT

## 2024-12-02 PROCEDURE — G2211 COMPLEX E/M VISIT ADD ON: CPT | Mod: NC

## 2024-12-05 ENCOUNTER — APPOINTMENT (OUTPATIENT)
Dept: NEUROLOGY | Facility: CLINIC | Age: 59
End: 2024-12-05
Payer: MEDICAID

## 2024-12-05 VITALS
OXYGEN SATURATION: 97 % | BODY MASS INDEX: 28.7 KG/M2 | DIASTOLIC BLOOD PRESSURE: 84 MMHG | HEIGHT: 71 IN | WEIGHT: 205 LBS | TEMPERATURE: 97.3 F | SYSTOLIC BLOOD PRESSURE: 117 MMHG | HEART RATE: 75 BPM

## 2024-12-05 DIAGNOSIS — M54.9 DORSALGIA, UNSPECIFIED: ICD-10-CM

## 2024-12-05 DIAGNOSIS — K62.89 OTHER SPECIFIED DISEASES OF ANUS AND RECTUM: ICD-10-CM

## 2024-12-05 DIAGNOSIS — N48.89 OTHER SPECIFIED DISORDERS OF PENIS: ICD-10-CM

## 2024-12-05 DIAGNOSIS — Z21 ASYMPTOMATIC HUMAN IMMUNODEFICIENCY VIRUS [HIV] INFECTION STATUS: ICD-10-CM

## 2024-12-05 DIAGNOSIS — R41.3 OTHER AMNESIA: ICD-10-CM

## 2024-12-05 DIAGNOSIS — C61 MALIGNANT NEOPLASM OF PROSTATE: ICD-10-CM

## 2024-12-05 PROCEDURE — 99215 OFFICE O/P EST HI 40 MIN: CPT

## 2024-12-12 ENCOUNTER — OUTPATIENT (OUTPATIENT)
Dept: OUTPATIENT SERVICES | Facility: HOSPITAL | Age: 59
LOS: 1 days | End: 2024-12-12

## 2024-12-12 ENCOUNTER — APPOINTMENT (OUTPATIENT)
Dept: INFECTIOUS DISEASE | Facility: CLINIC | Age: 59
End: 2024-12-12

## 2024-12-12 DIAGNOSIS — K62.9 DISEASE OF ANUS AND RECTUM, UNSPECIFIED: Chronic | ICD-10-CM

## 2024-12-12 DIAGNOSIS — Z98.890 OTHER SPECIFIED POSTPROCEDURAL STATES: Chronic | ICD-10-CM

## 2024-12-12 DIAGNOSIS — B20 HUMAN IMMUNODEFICIENCY VIRUS [HIV] DISEASE: ICD-10-CM

## 2024-12-12 DIAGNOSIS — Z93.3 COLOSTOMY STATUS: Chronic | ICD-10-CM

## 2024-12-12 DIAGNOSIS — Z96.0 PRESENCE OF UROGENITAL IMPLANTS: Chronic | ICD-10-CM

## 2024-12-12 PROCEDURE — G0463: CPT

## 2024-12-12 PROCEDURE — 99215 OFFICE O/P EST HI 40 MIN: CPT | Mod: 95

## 2024-12-13 ENCOUNTER — APPOINTMENT (OUTPATIENT)
Dept: UROLOGY | Facility: CLINIC | Age: 59
End: 2024-12-13

## 2024-12-13 ENCOUNTER — APPOINTMENT (OUTPATIENT)
Dept: MRI IMAGING | Facility: CLINIC | Age: 59
End: 2024-12-13
Payer: MEDICAID

## 2024-12-13 PROCEDURE — 70553 MRI BRAIN STEM W/O & W/DYE: CPT

## 2024-12-13 PROCEDURE — A9585: CPT

## 2024-12-18 ENCOUNTER — LABORATORY RESULT (OUTPATIENT)
Age: 59
End: 2024-12-18

## 2024-12-23 ENCOUNTER — NON-APPOINTMENT (OUTPATIENT)
Age: 59
End: 2024-12-23

## 2024-12-23 DIAGNOSIS — B20 HUMAN IMMUNODEFICIENCY VIRUS [HIV] DISEASE: ICD-10-CM

## 2024-12-26 ENCOUNTER — RESULT REVIEW (OUTPATIENT)
Age: 59
End: 2024-12-26

## 2024-12-26 ENCOUNTER — APPOINTMENT (OUTPATIENT)
Dept: INTERVENTIONAL RADIOLOGY/VASCULAR | Facility: HOSPITAL | Age: 59
End: 2024-12-26
Payer: MEDICAID

## 2024-12-26 ENCOUNTER — OUTPATIENT (OUTPATIENT)
Dept: OUTPATIENT SERVICES | Facility: HOSPITAL | Age: 59
LOS: 1 days | End: 2024-12-26
Payer: MEDICAID

## 2024-12-26 DIAGNOSIS — Z93.3 COLOSTOMY STATUS: Chronic | ICD-10-CM

## 2024-12-26 DIAGNOSIS — R41.3 OTHER AMNESIA: ICD-10-CM

## 2024-12-26 DIAGNOSIS — K62.9 DISEASE OF ANUS AND RECTUM, UNSPECIFIED: Chronic | ICD-10-CM

## 2024-12-26 DIAGNOSIS — Z96.0 PRESENCE OF UROGENITAL IMPLANTS: Chronic | ICD-10-CM

## 2024-12-26 LAB
APPEARANCE CSF: CLEAR — SIGNIFICANT CHANGE UP
APPEARANCE SPUN FLD: COLORLESS — SIGNIFICANT CHANGE UP
COLOR CSF: SIGNIFICANT CHANGE UP
GLUCOSE CSF-MCNC: 50 MG/DL — SIGNIFICANT CHANGE UP (ref 40–70)
GRAM STN FLD: SIGNIFICANT CHANGE UP
LYMPHOCYTES # CSF: 74 % — SIGNIFICANT CHANGE UP (ref 40–80)
MONOS+MACROS NFR CSF: 26 % — SIGNIFICANT CHANGE UP (ref 15–45)
NEUTROPHILS # CSF: 0 % — SIGNIFICANT CHANGE UP (ref 0–6)
NRBC NFR CSF: 4 /UL — SIGNIFICANT CHANGE UP (ref 0–5)
PROT CSF-MCNC: 44 MG/DL — SIGNIFICANT CHANGE UP (ref 15–45)
RBC # CSF: 4 /UL — HIGH (ref 0–0)
SPECIMEN SOURCE: SIGNIFICANT CHANGE UP
TUBE TYPE: SIGNIFICANT CHANGE UP

## 2024-12-26 PROCEDURE — 62328 DX LMBR SPI PNXR W/FLUOR/CT: CPT

## 2024-12-26 PROCEDURE — 89051 BODY FLUID CELL COUNT: CPT

## 2024-12-26 PROCEDURE — 86592 SYPHILIS TEST NON-TREP QUAL: CPT

## 2024-12-26 PROCEDURE — 86403 PARTICLE AGGLUT ANTBDY SCRN: CPT

## 2024-12-26 PROCEDURE — 87015 SPECIMEN INFECT AGNT CONCNTJ: CPT

## 2024-12-26 PROCEDURE — 87102 FUNGUS ISOLATION CULTURE: CPT

## 2024-12-26 PROCEDURE — 87070 CULTURE OTHR SPECIMN AEROBIC: CPT

## 2024-12-26 PROCEDURE — 83615 LACTATE (LD) (LDH) ENZYME: CPT

## 2024-12-26 PROCEDURE — 87205 SMEAR GRAM STAIN: CPT

## 2024-12-26 PROCEDURE — 84157 ASSAY OF PROTEIN OTHER: CPT

## 2024-12-26 PROCEDURE — 86778 TOXOPLASMA ANTIBODY IGM: CPT

## 2024-12-26 PROCEDURE — 87799 DETECT AGENT NOS DNA QUANT: CPT

## 2024-12-26 PROCEDURE — 82945 GLUCOSE OTHER FLUID: CPT

## 2024-12-26 PROCEDURE — 86777 TOXOPLASMA ANTIBODY: CPT

## 2024-12-27 LAB
CRYPTOC AG CSF-ACNC: NEGATIVE — SIGNIFICANT CHANGE UP
LDH CSF L TO P-CCNC: 12 U/L — SIGNIFICANT CHANGE UP
LDH FLD-CCNC: 12 U/L — SIGNIFICANT CHANGE UP

## 2024-12-28 LAB
EBV PCR: SIGNIFICANT CHANGE UP IU/ML
JCPYV DNA # CSF NAA+PROBE: SIGNIFICANT CHANGE UP COPIES/ML

## 2024-12-30 ENCOUNTER — EMERGENCY (EMERGENCY)
Facility: HOSPITAL | Age: 59
LOS: 0 days | Discharge: HOME | End: 2024-12-30
Attending: STUDENT IN AN ORGANIZED HEALTH CARE EDUCATION/TRAINING PROGRAM
Payer: MEDICAID

## 2024-12-30 VITALS
DIASTOLIC BLOOD PRESSURE: 84 MMHG | TEMPERATURE: 99 F | RESPIRATION RATE: 18 BRPM | OXYGEN SATURATION: 96 % | HEART RATE: 85 BPM | SYSTOLIC BLOOD PRESSURE: 120 MMHG

## 2024-12-30 VITALS
DIASTOLIC BLOOD PRESSURE: 80 MMHG | OXYGEN SATURATION: 99 % | WEIGHT: 205.03 LBS | SYSTOLIC BLOOD PRESSURE: 105 MMHG | HEART RATE: 88 BPM | RESPIRATION RATE: 18 BRPM | TEMPERATURE: 98 F | HEIGHT: 71 IN

## 2024-12-30 DIAGNOSIS — M79.604 PAIN IN RIGHT LEG: ICD-10-CM

## 2024-12-30 DIAGNOSIS — Z96.0 PRESENCE OF UROGENITAL IMPLANTS: Chronic | ICD-10-CM

## 2024-12-30 DIAGNOSIS — N50.89 OTHER SPECIFIED DISORDERS OF THE MALE GENITAL ORGANS: ICD-10-CM

## 2024-12-30 DIAGNOSIS — E78.5 HYPERLIPIDEMIA, UNSPECIFIED: ICD-10-CM

## 2024-12-30 DIAGNOSIS — I10 ESSENTIAL (PRIMARY) HYPERTENSION: ICD-10-CM

## 2024-12-30 DIAGNOSIS — Z85.46 PERSONAL HISTORY OF MALIGNANT NEOPLASM OF PROSTATE: ICD-10-CM

## 2024-12-30 DIAGNOSIS — Z93.6 OTHER ARTIFICIAL OPENINGS OF URINARY TRACT STATUS: ICD-10-CM

## 2024-12-30 DIAGNOSIS — K62.9 DISEASE OF ANUS AND RECTUM, UNSPECIFIED: Chronic | ICD-10-CM

## 2024-12-30 DIAGNOSIS — K59.00 CONSTIPATION, UNSPECIFIED: ICD-10-CM

## 2024-12-30 DIAGNOSIS — Z98.890 OTHER SPECIFIED POSTPROCEDURAL STATES: Chronic | ICD-10-CM

## 2024-12-30 DIAGNOSIS — R59.0 LOCALIZED ENLARGED LYMPH NODES: ICD-10-CM

## 2024-12-30 DIAGNOSIS — Z85.048 PERSONAL HISTORY OF OTHER MALIGNANT NEOPLASM OF RECTUM, RECTOSIGMOID JUNCTION, AND ANUS: ICD-10-CM

## 2024-12-30 DIAGNOSIS — R10.84 GENERALIZED ABDOMINAL PAIN: ICD-10-CM

## 2024-12-30 DIAGNOSIS — Z21 ASYMPTOMATIC HUMAN IMMUNODEFICIENCY VIRUS [HIV] INFECTION STATUS: ICD-10-CM

## 2024-12-30 DIAGNOSIS — Z93.3 COLOSTOMY STATUS: ICD-10-CM

## 2024-12-30 DIAGNOSIS — M54.9 DORSALGIA, UNSPECIFIED: ICD-10-CM

## 2024-12-30 DIAGNOSIS — Z93.3 COLOSTOMY STATUS: Chronic | ICD-10-CM

## 2024-12-30 LAB
ALBUMIN SERPL ELPH-MCNC: 3.1 G/DL — LOW (ref 3.3–5)
ALP SERPL-CCNC: 92 U/L — SIGNIFICANT CHANGE UP (ref 40–120)
ALT FLD-CCNC: 15 U/L — SIGNIFICANT CHANGE UP (ref 12–78)
ANION GAP SERPL CALC-SCNC: 2 MMOL/L — LOW (ref 5–17)
APPEARANCE UR: ABNORMAL
AST SERPL-CCNC: 10 U/L — LOW (ref 15–37)
BACTERIA # UR AUTO: NEGATIVE /HPF — SIGNIFICANT CHANGE UP
BASOPHILS # BLD AUTO: 0.03 K/UL — SIGNIFICANT CHANGE UP (ref 0–0.2)
BASOPHILS NFR BLD AUTO: 0.3 % — SIGNIFICANT CHANGE UP (ref 0–2)
BILIRUB SERPL-MCNC: 0.4 MG/DL — SIGNIFICANT CHANGE UP (ref 0.2–1.2)
BILIRUB UR-MCNC: NEGATIVE — SIGNIFICANT CHANGE UP
BUN SERPL-MCNC: 17 MG/DL — SIGNIFICANT CHANGE UP (ref 7–23)
CALCIUM SERPL-MCNC: 9.3 MG/DL — SIGNIFICANT CHANGE UP (ref 8.5–10.1)
CHLORIDE SERPL-SCNC: 108 MMOL/L — SIGNIFICANT CHANGE UP (ref 96–108)
CO2 SERPL-SCNC: 27 MMOL/L — SIGNIFICANT CHANGE UP (ref 22–31)
COLOR SPEC: YELLOW — SIGNIFICANT CHANGE UP
CREAT SERPL-MCNC: 0.98 MG/DL — SIGNIFICANT CHANGE UP (ref 0.5–1.3)
DIFF PNL FLD: ABNORMAL
EGFR: 89 ML/MIN/1.73M2 — SIGNIFICANT CHANGE UP
EOSINOPHIL # BLD AUTO: 0.01 K/UL — SIGNIFICANT CHANGE UP (ref 0–0.5)
EOSINOPHIL NFR BLD AUTO: 0.1 % — SIGNIFICANT CHANGE UP (ref 0–6)
GLUCOSE SERPL-MCNC: 114 MG/DL — HIGH (ref 70–99)
GLUCOSE UR QL: NEGATIVE MG/DL — SIGNIFICANT CHANGE UP
GRAN CASTS # UR COMP ASSIST: SIGNIFICANT CHANGE UP
HCT VFR BLD CALC: 39.2 % — SIGNIFICANT CHANGE UP (ref 39–50)
HGB BLD-MCNC: 12.8 G/DL — LOW (ref 13–17)
IMM GRANULOCYTES NFR BLD AUTO: 0.6 % — SIGNIFICANT CHANGE UP (ref 0–0.9)
KETONES UR-MCNC: NEGATIVE MG/DL — SIGNIFICANT CHANGE UP
LACTATE SERPL-SCNC: 1.2 MMOL/L — SIGNIFICANT CHANGE UP (ref 0.7–2)
LEUKOCYTE ESTERASE UR-ACNC: ABNORMAL
LIDOCAIN IGE QN: 15 U/L — SIGNIFICANT CHANGE UP (ref 13–75)
LYMPHOCYTES # BLD AUTO: 1.82 K/UL — SIGNIFICANT CHANGE UP (ref 1–3.3)
LYMPHOCYTES # BLD AUTO: 20.8 % — SIGNIFICANT CHANGE UP (ref 13–44)
MAGNESIUM SERPL-MCNC: 2.3 MG/DL — SIGNIFICANT CHANGE UP (ref 1.6–2.6)
MCHC RBC-ENTMCNC: 28.9 PG — SIGNIFICANT CHANGE UP (ref 27–34)
MCHC RBC-ENTMCNC: 32.7 G/DL — SIGNIFICANT CHANGE UP (ref 32–36)
MCV RBC AUTO: 88.5 FL — SIGNIFICANT CHANGE UP (ref 80–100)
MONOCYTES # BLD AUTO: 0.99 K/UL — HIGH (ref 0–0.9)
MONOCYTES NFR BLD AUTO: 11.3 % — SIGNIFICANT CHANGE UP (ref 2–14)
NEUTROPHILS # BLD AUTO: 5.87 K/UL — SIGNIFICANT CHANGE UP (ref 1.8–7.4)
NEUTROPHILS NFR BLD AUTO: 66.9 % — SIGNIFICANT CHANGE UP (ref 43–77)
NITRITE UR-MCNC: NEGATIVE — SIGNIFICANT CHANGE UP
NRBC # BLD: 0 /100 WBCS — SIGNIFICANT CHANGE UP (ref 0–0)
PH UR: 7.5 — SIGNIFICANT CHANGE UP (ref 5–8)
PLATELET # BLD AUTO: 462 K/UL — HIGH (ref 150–400)
POTASSIUM SERPL-MCNC: 4.1 MMOL/L — SIGNIFICANT CHANGE UP (ref 3.5–5.3)
POTASSIUM SERPL-SCNC: 4.1 MMOL/L — SIGNIFICANT CHANGE UP (ref 3.5–5.3)
PROT SERPL-MCNC: 7.8 GM/DL — SIGNIFICANT CHANGE UP (ref 6–8.3)
PROT UR-MCNC: 300 MG/DL
RBC # BLD: 4.43 M/UL — SIGNIFICANT CHANGE UP (ref 4.2–5.8)
RBC # FLD: 14 % — SIGNIFICANT CHANGE UP (ref 10.3–14.5)
RBC CASTS # UR COMP ASSIST: SIGNIFICANT CHANGE UP /HPF (ref 0–4)
SODIUM SERPL-SCNC: 137 MMOL/L — SIGNIFICANT CHANGE UP (ref 135–145)
SP GR SPEC: 1.02 — SIGNIFICANT CHANGE UP (ref 1–1.03)
UROBILINOGEN FLD QL: 0.2 MG/DL — SIGNIFICANT CHANGE UP (ref 0.2–1)
WBC # BLD: 8.77 K/UL — SIGNIFICANT CHANGE UP (ref 3.8–10.5)
WBC # FLD AUTO: 8.77 K/UL — SIGNIFICANT CHANGE UP (ref 3.8–10.5)
WBC UR QL: SIGNIFICANT CHANGE UP /HPF (ref 0–5)

## 2024-12-30 PROCEDURE — 74177 CT ABD & PELVIS W/CONTRAST: CPT | Mod: 26,MC

## 2024-12-30 PROCEDURE — 99285 EMERGENCY DEPT VISIT HI MDM: CPT

## 2024-12-30 PROCEDURE — 72132 CT LUMBAR SPINE W/DYE: CPT | Mod: 26,MC

## 2024-12-30 PROCEDURE — 99283 EMERGENCY DEPT VISIT LOW MDM: CPT

## 2024-12-30 PROCEDURE — 50435 EXCHANGE NEPHROSTOMY CATH: CPT | Mod: 50

## 2024-12-30 RX ORDER — SODIUM CHLORIDE 9 MG/ML
1000 INJECTION, SOLUTION INTRAMUSCULAR; INTRAVENOUS; SUBCUTANEOUS ONCE
Refills: 0 | Status: COMPLETED | OUTPATIENT
Start: 2024-12-30 | End: 2024-12-30

## 2024-12-30 RX ORDER — ACETAMINOPHEN 500MG 500 MG/1
1000 TABLET, COATED ORAL ONCE
Refills: 0 | Status: COMPLETED | OUTPATIENT
Start: 2024-12-30 | End: 2024-12-30

## 2024-12-30 RX ORDER — CIPROFLOXACIN HCL 750 MG
1 TABLET ORAL
Qty: 6 | Refills: 0
Start: 2024-12-30 | End: 2025-01-01

## 2024-12-30 RX ADMIN — SODIUM CHLORIDE 1000 MILLILITER(S): 9 INJECTION, SOLUTION INTRAMUSCULAR; INTRAVENOUS; SUBCUTANEOUS at 12:52

## 2024-12-30 RX ADMIN — ACETAMINOPHEN 500MG 400 MILLIGRAM(S): 500 TABLET, COATED ORAL at 12:52

## 2024-12-30 NOTE — ED ADULT NURSE NOTE - OBJECTIVE STATEMENT
Patient is alert and oriented x4. Came in for abdominal pain, n/v and diarrhea x3 weeks. Has bilateral nephrostomy tubes, states left nephrostomy stopped draining 3 days ago. Wife also reports intermittent low grade fever x3 days. Urine on nephrostomy drains appears cloudy. No n/v at this time. Endorsed to primary RN.

## 2024-12-30 NOTE — ED PROVIDER NOTE - CLINICAL SUMMARY MEDICAL DECISION MAKING FREE TEXT BOX
58 y/o male with htn, hld, hiv on haart, history anal ca s/p coloscomy and prostate ca with nephrostomy tubes here with abdominal pain, constipation and decrease urine output in bilateral nephrostomy and numbness to the right leg. Able to ambulate. Last nephrostomy tubes exchange 11.2024   Vs stable.   Ddx include obstruction, pyelonephritis,  history back infection given recent spinal tap.   Will obtain basic labs, UA, pain meds and ct a/p and ct lumbar ordered. 60 y/o male with htn, hld, hiv on haart, history anal ca s/p coloscomy and prostate ca with nephrostomy tubes here with abdominal pain, constipation and decrease urine output in bilateral nephrostomy and numbness to the right leg. Able to ambulate. Last nephrostomy tubes exchange 11.2024   Vs stable.   Ddx include obstruction, pyelonephritis,  history back infection given recent spinal tap. Low suspicion for torsion given pt without pain.   Will obtain basic labs, UA, pain meds and ct a/p and ct lumbar ordered. 60 y/o male with htn, hld, hiv on haart, history anal ca s/p colostomy and prostate ca with nephrostomy tubes here with abdominal pain, constipation and decrease urine output in bilateral nephrostomy and numbness to the right leg. Able to ambulate. Last nephrostomy tubes exchange 11.2024   Vs stable.   Ddx include obstruction, pyelonephritis,  history back infection given recent spinal tap. Low suspicion for torsion given pt without pain.   Will obtain basic labs, UA, pain meds and ct a/p and ct lumbar ordered.    labs reviewed and unremarkable.    UA shows mild UTI?  Ct a/p:   Left ureteritis and right pyelitis/ureteritis, nonspecific and   possibly inflammatory in the setting of chronic instrumentation.   Correlate for active urinary tract infection.  2.  Stable moderate left hydroureteronephrosis with nephrostomy tube in   place.  3.  Mild retroperitoneal lymphadenopathy, worsened since 09/03/2024.   Uncertain whether lymphadenopathy is reactive or metastatic in nature.  4.  Moderate colonic stool retention.   Ct lumbar : No vertebral fracture is recognized.    2. Mild to moderate lumbar degenerative disc disease    Urology consulted given pt without urine output from the left nephrostomy tube. 60 y/o male with htn, hld, hiv on haart, history anal ca s/p colostomy and prostate ca with nephrostomy tubes here with abdominal pain, constipation and decrease urine output in bilateral nephrostomy and numbness to the right leg. Able to ambulate. Last nephrostomy tubes exchange 11.2024   Vs stable.   Ddx include obstruction, pyelonephritis,  history back infection given recent spinal tap. Low suspicion for torsion given pt without pain.   Will obtain basic labs, UA, pain meds and ct a/p and ct lumbar ordered.    labs reviewed and unremarkable.    UA shows mild UTI?  Ct a/p:   Left ureteritis and right pyelitis/ureteritis, nonspecific and   possibly inflammatory in the setting of chronic instrumentation.   Correlate for active urinary tract infection.  2.  Stable moderate left hydroureteronephrosis with nephrostomy tube in   place.  3.  Mild retroperitoneal lymphadenopathy, worsened since 09/03/2024.   Uncertain whether lymphadenopathy is reactive or metastatic in nature.  4.  Moderate colonic stool retention.   Ct lumbar : No vertebral fracture is recognized.    2. Mild to moderate lumbar degenerative disc disease    Urology consulted given pt without urine output from the left nephrostomy tube.  Pt was seen by urology PA and IR was arranged from nephrostomy tube exchange.   Nephrostomy tubes were exchanged by IR. Discussed with urology and pt stable to be discharged home and follow up urology. Recommend cipro abx for 3 days.   Pt reassessed and reports feeling much better. Pt stable to be discharged home and follow up with PMD and urology.   Rx cipro sent to pharmacy.

## 2024-12-30 NOTE — ED ADULT TRIAGE NOTE - CHIEF COMPLAINT QUOTE
pt c/o diffuse abdominal pain and constipation for 10 days. also c/o b/l testicular swelling for 3 weeks and back pain and right leg pain and numbness from the knee down for 3 weeks as well.  also states his nephrostomy tubes are not filling for 3 weeks. also  c/o generalize weakness. history of HIV, HTN, high cholesterol. depression and anal cancer.

## 2024-12-30 NOTE — PROCEDURE NOTE - PLAN
- back to floor/ED  - remainder of care per primary team  - to follow up with IR ever 2 months for routine catheter exchanges.   - he can call [phone] for scheduling. Please include in discharge summary.   A card with this number was also provided to him

## 2024-12-30 NOTE — ED ADULT TRIAGE NOTE - HEIGHT IN FEET
Echo 11/10/12: EF 70%, min MR, grossly nl LV sys fx , mild diastolic dysfx     a/p  77 year old male, w/ PMH of with recurrent warm autoimmune hemolytic anemia, PNET, seizures after [place], who p/w SOB, fever, new onset AFib with RVR    1. New onset Afib with RVR   -reactive to the underlying lung process/ infection, sepsis vs delayed transfusion reaction.  -s/p RRT 12/9, events noted, s/pamio gtt   -transitioned amio to 400 mg PO TID   -currently in NSR  -cont BB  -a/c on hold for gluteal bx   -ChadsVac score of 3  -ecg with no acs  -HS trops elevated, likely demand ischemia in the setting new onset afib rvr, hypotension, sepsis, nirmal   -echo w normal LVEF    2.  Shortness of Breath   -multifactorial in the setting of new onset afib rvr and underling lung process/ infection  -Ct chest with Right upper lobe 4.8 x 3.2 cm masslike consolidation which may represent neoplasm or pneumonia, pulm nodules. ?mets disease  -pulm f/u noted : not likely to be malignant, prob pulm septic emboli      3 .New pulmonary mass and nodule  -CT chest noted: pulm f/u noted  -CT a/p noted: heme f/u noted  -management/work up per pulm, hem/onc    4. autoimmune hemolytic anemia  -management per hem/onc     5. Sepsis  -blood cultures positive for Nocardia farcinia,  -IR F/u for R gluteal soft tissue mass sampling and possible lung biopsy  -Echo with no evidence of vegetation   -iv abx per ID-renal dosed   -pending JAZIEL ordered to r/o infective endocarditis   -management per ID     dvt ppx 5

## 2024-12-30 NOTE — ED PROVIDER NOTE - NSFOLLOWUPINSTRUCTIONS_ED_ALL_ED_FT
Rest, drink plenty of fluids.  Advance activity as tolerated.  Continue all previously prescribed medications as directed.  Follow up with your primary care physician and your urologist  this week- bring copies of your results.  Return to the ER for worsening or persistent symptoms, and/or ANY NEW OR CONCERNING SYMPTOMS. If you have issues obtaining follow up, please call: [phone] to obtain a doctor or specialist who takes your insurance in your area.  You may call [phone] to make an appointment with the internal medicine clinic.

## 2024-12-30 NOTE — ED PROVIDER NOTE - PATIENT PORTAL LINK FT
You can access the FollowMyHealth Patient Portal offered by [facility] by registering at the following website: [email or web address]. By joining [facility]’s FollowMyHealth portal, you will also be able to view your health information using other applications (apps) compatible with our system.

## 2024-12-30 NOTE — ED PROVIDER NOTE - ATTENDING APP SHARED VISIT CONTRIBUTION OF CARE
Patient seen predominantly by ACP    60 y/o M with complex PMH including HTN, HLD, HIV on HAART, anal and prostate ca/ s/p chemo/radiation/colostomy, bilateral nephrostomy tubes, now w/ abd pain. Patient reports feeling constipated. States having decreased UOP from L nephrostomy tube. Subjective fever. No vomiting. Also endorsing RLE "numbness" without weakness.  In the ED, vitals stable.  Patient in NAD.  Plan for labs and CT AP to eval nephrostomy tubes.  Patient also had a recent LP on his back, will obtain CT L-spine as he now endorses leg numbness and is having vague abd pain.  Will reassess following labs/imaging

## 2024-12-30 NOTE — CONSULT NOTE ADULT - ASSESSMENT
59y Male with PMHx HLD, anal cancer s/p chemo/radiotherapy, colon cancer s/p coloctomy, prostate cancer received radiation therapy, HIV on HAART, presents with issues related to his left nephrostomy bag. Urology consulted for Left NT malfunction. Pt also reports having bilateral testicular swelling x 3 weeks. In ED, pt AVSS, CT showed Left ureteritis and right pyelitis/ureteritis, stable moderate left hydro with NT in place. Mild retroperitoneal lymphadenopathy, and moderate colonic stool retention    Plan:   - No acute  intervention at this time  - Recommend Left NT exchange, discussed with IR and they will replaced today  - Recommend GI consult for bowel regiment given hx of constipation  - Case discussed with attending Dr. Mack 59y Male with PMHx HLD, anal cancer s/p chemo/radiotherapy, colon cancer s/p coloctomy, prostate cancer received radiation therapy, HIV on HAART, presents with issues related to his left nephrostomy bag. Urology consulted for Left NT malfunction. Pt also reports having bilateral testicular swelling x 3 weeks. In ED, pt AVSS, CT showed Left ureteritis and right pyelitis/ureteritis, stable moderate left hydro with NT in place. Mild retroperitoneal lymphadenopathy, and moderate colonic stool retention    Plan:   - No acute  intervention at this time  - Recommend Left NT exchange, discussed with IR and they will replaced today  - Recommend GI consult for bowel regiment given hx of constipation  - Discharge home with oral antibiotic  - Case discussed with attending Dr. Mack 59y Male with PMHx HLD, anal cancer s/p chemo/radiotherapy, colon cancer s/p coloctomy, prostate cancer received radiation therapy, HIV on HAART, presents with issues related to his left nephrostomy bag. Urology consulted for Left NT malfunction. Pt also reports having bilateral testicular swelling x 3 weeks. In ED, pt AVSS, CT showed Left ureteritis and right pyelitis/ureteritis, stable moderate left hydro with NT in place. Mild retroperitoneal lymphadenopathy, and moderate colonic stool retention  Clinical examination of scrotum-no evidence of tenderness or signs suggesting orchitis    Plan:   - No acute  intervention at this time  - Recommend Left NT exchange, discussed with IR and they will replaced today  - Recommend GI consult for bowel regiment given hx of constipation  - Discharge home with oral antibiotic  - Case discussed with attending Dr. Mack

## 2024-12-30 NOTE — ED PROVIDER NOTE - PHYSICAL EXAMINATION
GEN: Awake, alert, interactive, NAD.  HEAD AND NECK: NC/AT. Airway patent. Neck supple.   EYES:  Clear b/l. EOMI. PERRL.   ENT: Moist mucus membranes.   CARDIAC: Regular rate, regular rhythm. No evident pedal edema.    RESP/CHEST: Normal respiratory effort with no use of accessory muscles or retractions. Clear throughout on auscultation.  ABD: soft, non-distended, (+) mild generalized tenderness. No rebound, no guarding. (+) colostomy   BACK: No midline spinal TTP. No CVAT. Bilateral nephrostomy tubes in place with scant cloudy yellow urine in bag.   EXTREMITIES: Moving all extremities with no apparent deformities.   SKIN: Warm, dry, intact normal color. No rash.   NEURO: AOx3, CN II-XII grossly intact, no focal deficits.   PSYCH: Appropriate mood and affect. GEN: Awake, alert, interactive, NAD.  HEAD AND NECK: NC/AT. Airway patent. Neck supple.   EYES:  Clear b/l. EOMI. PERRL.   ENT: Moist mucus membranes.   CARDIAC: Regular rate, regular rhythm. No evident pedal edema.    RESP/CHEST: Normal respiratory effort with no use of accessory muscles or retractions. Clear throughout on auscultation.  ABD: soft, non-distended, (+) mild generalized tenderness. No rebound, no guarding. (+) colostomy   : Chaperoned by nurse (-) testicular swelling, (-) testicular tenderness. (-) erythema, (-) lesions, (-) abnormal penile discharge.   BACK: No midline spinal TTP. No CVAT. Bilateral nephrostomy tubes in place with scant cloudy yellow urine in bag.   EXTREMITIES: Moving all extremities with no apparent deformities.   SKIN: Warm, dry, intact normal color. No rash.   NEURO: AOx3, CN II-XII grossly intact, no focal deficits.   PSYCH: Appropriate mood and affect.

## 2024-12-30 NOTE — ED ADULT NURSE NOTE - PAIN: PRESENCE, MLM
December 10, 2018      [facility]  [address] [place] [address]  Phone: [phone]  Fax: [phone]       Patient: Yuri Garland   YOB: 2003  Date of Visit: 12/10/2018    To Whom It May Concern:    Sangeetha Garland  was at [facility] on 12/10/2018. He may return to school on 12/11/2018 with no restrictions. If you have any questions or concerns, or if I can be of further assistance, please do not hesitate to contact me.    Sincerely,          Frances Cheema PA-C     
abdomen/complains of pain/discomfort

## 2024-12-30 NOTE — ED PROVIDER NOTE - OBJECTIVE STATEMENT
60 y/o male with htn, hld, hiv on haart, history anal ca s/p chemo/radiotherapy , colostomy , prostate ca s/p radiotherapy, bilateral nephrostomy tubes here with abdominal pain x 3 weeks. Pt reports being constipated with no BM > 5 days. Pt also reports having right lower back pain and noted no urine output in his bilateral nephrostomy tubes for 3 days. Reports having subjective fever. Denies vomiting. Pt also reports having bilateral testicular swelling x 3 weeks , saw PMD and had US done awaiting results. Pt also has numbness to the right leg radiating to the calf x 3 weeks. Denies weakness. Pt reports having spinal fluid drawn from his back to test for memory lost last week. 58 y/o male with htn, hld, hiv on haart, history anal ca s/p chemo/radiotherapy , colostomy , prostate ca s/p radiotherapy, bilateral nephrostomy tubes here with abdominal pain x 3 weeks. Pt reports being constipated with no BM > 5 days. Pt also reports having right lower back pain and noted no urine output in his bilateral nephrostomy tubes for 3 days. Reports having subjective fever. Denies vomiting. Pt also reports having bilateral testicular swelling x 3 weeks , saw PMD and had US done awaiting results. Denies testicular pain.  Pt also has numbness to the right leg radiating to the calf x 3 weeks. Denies weakness. Pt reports having spinal fluid drawn from his back to test for memory lost last week.

## 2024-12-30 NOTE — CONSULT NOTE ADULT - SUBJECTIVE AND OBJECTIVE BOX
HPI:  59y Male with PMHx    PAST MEDICAL & SURGICAL HISTORY:  HTN (hypertension)      HLD (hyperlipidemia)      HIV infection      Anxiety      BPH (benign prostatic hyperplasia)      History of anal cancer      Anal lesion      S/P colostomy      History of gastroscopy      Ureteral stent present          FAMILY HISTORY:  FH: prostate cancer    FH: breast cancer    No known  malignancy     Denies alcohol and drug abuse, nonsmoker     MEDICATIONS  (STANDING):    MEDICATIONS  (PRN):    Allergies    No Known Allergies    Intolerances      REVIEW OF SYSTEMS: Pertinent positives and negatives as stated in HPI, otherwise negative    Vital signs  T(C): 36.9 (12-30-24 @ 16:44), Max: 36.9 (12-30-24 @ 16:44)  HR: 75 (12-30-24 @ 16:44)  BP: 132/89 (12-30-24 @ 16:44)  SpO2: 98% (12-30-24 @ 16:44)  Wt(kg): --    Physical Exam  Gen: NAD  HEENT: normocephalic, atraumatic, no scleral icterus  Pulm: CTA b/l, No respiratory distress, no subcostal retractions, no accessory muscle use   CV: S1 S2, RRR,  no JVD  Abd: Soft, NT, ND, no rebound tenderness or guarding  : Uncircumcised/Circumcised, no lesions.  No discharge or blood at urethral meatus.  Testes descended bilaterally.  Testes and epididymis nontender bilaterally.  Cremasteric reflex present bilaterally.  MSK:  No CVAT, Moving all extremities, full ROM in all extremities, No edema present  NEURO: A&Ox3, no focal neurological deficits, CN 2-12 grossly intact  SKIN: warm, dry, no rash.    LABS:  CBC                       12.8   8.77  )-----------( 462      ( 30 Dec 2024 10:15 )             39.2     BMP   12-30    137  |  108  |  17  ----------------------------<  114[H]  4.1   |  27  |  0.98    Ca    9.3      30 Dec 2024 10:15  Mg     2.3     12-30    TPro  7.8  /  Alb  3.1[L]  /  TBili  0.4  /  DBili  x   /  AST  10[L]  /  ALT  15  /  AlkPhos  92  12-30        Urinalysis Basic - ( 30 Dec 2024 10:15 )    Color: x / Appearance: x / SG: x / pH: x  Gluc: 114 mg/dL / Ketone: x  / Bili: x / Urobili: x   Blood: x / Protein: x / Nitrite: x   Leuk Esterase: x / RBC: x / WBC x   Sq Epi: x / Non Sq Epi: x / Bacteria: x      Urine Cx:   Blood Cx:    Radiology: HPI:  59y Male with PMHx HLD, anal cancer s/p chemo/radiotherapy, colon cancer s/p coloctomy, prostate cancer received radiation therapy, HIV on HAART, presents with issues related to his left nephrostomy bag. Pt. states that it has not been draining since last night and has been causing pain in his back. Pt. states that he and he wife attempted to flush drains and they were painful. Pt last B/L nephrostomy tubes exchange on 11/08/24 and flushed by his wife once per day. He also reports that being constipated with no BM > 5 days, even taking Miralax, dulcolax daily. Pt also reports having bilateral testicular swelling x 3 weeks , saw PMD and had US done awaiting results. He reports numbness to the right leg x 3 wks.  Patient reports subjective fever at home.  The patient is primarily cared for by Dr. Schwartz, his primary care physician, and Dr. Mathew urologist.    PAST MEDICAL & SURGICAL HISTORY:  HTN (hypertension)      HLD (hyperlipidemia)      HIV infection      Anxiety      BPH (benign prostatic hyperplasia)      History of anal cancer      Anal lesion      S/P colostomy      History of gastroscopy      Ureteral stent present          FAMILY HISTORY:  FH: prostate cancer    FH: breast cancer    No known  malignancy     Denies alcohol and drug abuse, nonsmoker     MEDICATIONS  (STANDING):    MEDICATIONS  (PRN):    Allergies    No Known Allergies    Intolerances      REVIEW OF SYSTEMS: Pertinent positives and negatives as stated in HPI, otherwise negative    Vital signs  T(C): 36.9 (12-30-24 @ 16:44), Max: 36.9 (12-30-24 @ 16:44)  HR: 75 (12-30-24 @ 16:44)  BP: 132/89 (12-30-24 @ 16:44)  SpO2: 98% (12-30-24 @ 16:44)  Wt(kg): --    Physical Exam  Gen: NAD  HEENT: normocephalic, atraumatic, no scleral icterus  Pulm: CTA b/l, No respiratory distress, no subcostal retractions, no accessory muscle use   CV: S1 S2, RRR,  no JVD  Abd: Soft, NT, ND, no rebound tenderness or guarding  : Uncircumcised, no lesions. No blood at urethral meatus.  Testes descended bilaterally. Cremasteric reflex present bilaterally.  MSK:  No CVAT bilaterally    LABS:  CBC                       12.8   8.77  )-----------( 462      ( 30 Dec 2024 10:15 )             39.2     BMP   12-30    137  |  108  |  17  ----------------------------<  114[H]  4.1   |  27  |  0.98    Ca    9.3      30 Dec 2024 10:15  Mg     2.3     12-30    TPro  7.8  /  Alb  3.1[L]  /  TBili  0.4  /  DBili  x   /  AST  10[L]  /  ALT  15  /  AlkPhos  92  12-30        Urinalysis Basic - ( 30 Dec 2024 10:15 )    Color: x / Appearance: x / SG: x / pH: x  Gluc: 114 mg/dL / Ketone: x  / Bili: x / Urobili: x   Blood: x / Protein: x / Nitrite: x   Leuk Esterase: x / RBC: x / WBC x   Sq Epi: x / Non Sq Epi: x / Bacteria: x      Urine Cx: Pending    Radiology:  ACC: [id number] EXAM:  CT ABDOMEN AND PELVIS IC   ORDERED BY: JOSÉ LUIS BYNUM     PROCEDURE DATE:  12/30/2024          INTERPRETATION:  CLINICAL INFORMATION: Abdominal pain, constipation,   bilateral nephrostomy tubes, colostomy    COMPARISON: CT ofthe abdomen and pelvis dated 09/03/2024    CONTRAST/COMPLICATIONS:  IV Contrast: Omnipaque 350  95 cc administered   5 cc discarded  Oral Contrast: NONE  .    PROCEDURE:  CT of the Abdomen and Pelvis was performed.  Sagittal and coronal reformats were performed.    FINDINGS:  LOWER CHEST: Within normal limits.    LIVER: Within normal limits.  BILE DUCTS: Normal caliber.  GALLBLADDER: Within normal limits.  SPLEEN: Within normal limits.  PANCREAS: Within normal limits.  ADRENALS: Within normal limits.  KIDNEYS/URETERS: Bilateral renal cysts. Bilateral nephrostomy tubes   present. Stable moderate left hydroureteronephrosis, ureter gradually   tapering distally. Mild fullness of the right renal pelvis and droplet of   gas presumably related to instrumentation. Diffuse bilateral urothelial   thickening and hyperenhancement throughout the left ureter and right   urinary tract.    BLADDER: Partially underdistended. Nonspecific thickening.  REPRODUCTIVE ORGANS: Mildly enlarged prostate.    BOWEL: Status post APR with left lower quadrant end colostomy and   lipomatous flap in the pelvic floor. No obstruction. Moderate colonic   stool retention. Appendix is normal.  PERITONEUM/RETROPERITONEUM: Within normal limits.  VESSELS: Infrarenal IVC filter. Mild atherosclerotic changes.  LYMPH NODES: Increased size of several small retroperitoneal nodes   measuring 1 cm in short axis or less. For reference, a 1.0 x 1.2 cm   retrocaval node previously measured 1.1 x 0.7 cm (2, 74).  ABDOMINAL WALL: Midline postsurgical changes. Small parastomal hernia   containing a knuckle of nonobstructed small bowel.  BONES: No aggressive osseous lesion.    IMPRESSION:  1.  Left ureteritis and right pyelitis/ureteritis, nonspecific and   possibly inflammatory in the setting of chronic instrumentation.   Correlate for active urinary tract infection.  2.  Stable moderate left hydroureteronephrosis with nephrostomy tube in   place.  3.  Mild retroperitoneal lymphadenopathy, worsened since 09/03/2024.   Uncertain whether lymphadenopathy is reactive or metastatic in nature.  4.  Moderate colonic stool retention.          --- End of Report ---

## 2024-12-30 NOTE — PROCEDURE NOTE - PROCEDURE FINDINGS AND DETAILS
Right and left PCN cut and replaced with 8.5F pigtail catheters. Catheters formed in the renal pelvis bilaterally.

## 2024-12-30 NOTE — ED PROVIDER NOTE - NS ED ROS FT
CONSTITUTIONAL: no chills, no fatigue  EYES: No visual changes  ENT: No ear pain, no sore throat  CARDIOVASCULAR: No chest pain, no palpitations  RESPIRATORY: No cough, no SOB  GI: no nausea, no vomiting, no diarrhea  GENITOURINARY: No dysuria, no frequency, no hematuria  MUSKULOSKELETAL:no joint pain, no myalgias  SKIN: No rash  NEURO: No headache    ALL OTHER SYSTEMS NEGATIVE.

## 2024-12-31 LAB
CULTURE RESULTS: SIGNIFICANT CHANGE UP
CULTURE RESULTS: SIGNIFICANT CHANGE UP
SPECIMEN SOURCE: SIGNIFICANT CHANGE UP
SPECIMEN SOURCE: SIGNIFICANT CHANGE UP
T GONDII IGG CSF QL IA: SIGNIFICANT CHANGE UP
T GONDII IGG CSF-ACNC: <0.9 — SIGNIFICANT CHANGE UP
T GONDII IGM CSF-ACNC: <0.8 — SIGNIFICANT CHANGE UP
TOXOPLASMA GONDII IGM AB [PRESENCE] IN CEREBRAL SPINAL FLUID BY IMMUNOASSAY: SIGNIFICANT CHANGE UP
VDRL CSF-TITR: SIGNIFICANT CHANGE UP

## 2025-01-03 ENCOUNTER — APPOINTMENT (OUTPATIENT)
Dept: NEUROLOGY | Facility: CLINIC | Age: 60
End: 2025-01-03
Payer: MEDICAID

## 2025-01-03 DIAGNOSIS — R41.3 OTHER AMNESIA: ICD-10-CM

## 2025-01-03 PROCEDURE — ZZZZZ: CPT

## 2025-01-07 ENCOUNTER — APPOINTMENT (OUTPATIENT)
Dept: INTERNAL MEDICINE | Facility: CLINIC | Age: 60
End: 2025-01-07
Payer: MEDICAID

## 2025-01-07 ENCOUNTER — APPOINTMENT (OUTPATIENT)
Dept: CARDIOLOGY | Facility: CLINIC | Age: 60
End: 2025-01-07
Payer: MEDICAID

## 2025-01-07 VITALS
OXYGEN SATURATION: 99 % | WEIGHT: 205 LBS | BODY MASS INDEX: 28.7 KG/M2 | HEART RATE: 69 BPM | DIASTOLIC BLOOD PRESSURE: 80 MMHG | HEIGHT: 71 IN | SYSTOLIC BLOOD PRESSURE: 118 MMHG

## 2025-01-07 DIAGNOSIS — E78.49 OTHER HYPERLIPIDEMIA: ICD-10-CM

## 2025-01-07 DIAGNOSIS — I10 ESSENTIAL (PRIMARY) HYPERTENSION: ICD-10-CM

## 2025-01-07 DIAGNOSIS — R60.0 LOCALIZED EDEMA: ICD-10-CM

## 2025-01-07 PROCEDURE — 99213 OFFICE O/P EST LOW 20 MIN: CPT

## 2025-01-07 PROCEDURE — G2211 COMPLEX E/M VISIT ADD ON: CPT | Mod: NC

## 2025-01-07 PROCEDURE — 93306 TTE W/DOPPLER COMPLETE: CPT

## 2025-01-09 ENCOUNTER — APPOINTMENT (OUTPATIENT)
Dept: INFECTIOUS DISEASE | Facility: CLINIC | Age: 60
End: 2025-01-09

## 2025-01-09 ENCOUNTER — OUTPATIENT (OUTPATIENT)
Dept: OUTPATIENT SERVICES | Facility: HOSPITAL | Age: 60
LOS: 1 days | End: 2025-01-09
Payer: MEDICAID

## 2025-01-09 DIAGNOSIS — K62.9 DISEASE OF ANUS AND RECTUM, UNSPECIFIED: Chronic | ICD-10-CM

## 2025-01-09 DIAGNOSIS — Z93.3 COLOSTOMY STATUS: Chronic | ICD-10-CM

## 2025-01-09 PROCEDURE — G0463: CPT

## 2025-01-09 PROCEDURE — 99212 OFFICE O/P EST SF 10 MIN: CPT | Mod: 95

## 2025-01-10 ENCOUNTER — APPOINTMENT (OUTPATIENT)
Dept: UROLOGY | Facility: CLINIC | Age: 60
End: 2025-01-10
Payer: MEDICAID

## 2025-01-10 ENCOUNTER — NON-APPOINTMENT (OUTPATIENT)
Age: 60
End: 2025-01-10

## 2025-01-10 VITALS
DIASTOLIC BLOOD PRESSURE: 80 MMHG | SYSTOLIC BLOOD PRESSURE: 113 MMHG | BODY MASS INDEX: 27.58 KG/M2 | RESPIRATION RATE: 16 BRPM | TEMPERATURE: 97.2 F | OXYGEN SATURATION: 95 % | HEART RATE: 72 BPM | HEIGHT: 71 IN | WEIGHT: 197 LBS

## 2025-01-10 DIAGNOSIS — B20 HUMAN IMMUNODEFICIENCY VIRUS [HIV] DISEASE: ICD-10-CM

## 2025-01-10 DIAGNOSIS — Z93.6 OTHER ARTIFICIAL OPENINGS OF URINARY TRACT STATUS: ICD-10-CM

## 2025-01-10 DIAGNOSIS — N20.0 CALCULUS OF KIDNEY: ICD-10-CM

## 2025-01-10 PROCEDURE — 99213 OFFICE O/P EST LOW 20 MIN: CPT

## 2025-01-14 ENCOUNTER — APPOINTMENT (OUTPATIENT)
Dept: COLORECTAL SURGERY | Facility: CLINIC | Age: 60
End: 2025-01-14
Payer: MEDICAID

## 2025-01-14 PROCEDURE — 99213 OFFICE O/P EST LOW 20 MIN: CPT

## 2025-01-15 NOTE — PROCEDURE NOTE - PLAN
IR recovery 2 hours  -f/u culture  -forward flush only 5cc NS qd  -care per primary team

## 2025-01-17 DIAGNOSIS — F41.9 ANXIETY DISORDER, UNSPECIFIED: ICD-10-CM

## 2025-01-17 DIAGNOSIS — F33.1 MAJOR DEPRESSIVE DISORDER, RECURRENT, MODERATE: ICD-10-CM

## 2025-01-19 NOTE — PHYSICAL THERAPY INITIAL EVALUATION ADULT - THERAPY FREQUENCY, PT EVAL
Problem: Potential for Falls  Goal: Patient will remain free of falls  Description: INTERVENTIONS:  - Educate patient/family on patient safety including physical limitations  - Instruct patient to call for assistance with activity   - Consult OT/PT to assist with strengthening/mobility   - Keep Call bell within reach  - Keep bed low and locked with side rails adjusted as appropriate  - Keep care items and personal belongings within reach  - Initiate and maintain comfort rounds  - Make Fall Risk Sign visible to staff  - Offer Toileting every 2 Hours, in advance of need  - Initiate/Maintain bedalarm  - Obtain necessary fall risk management equipment:   - Apply yellow socks and bracelet for high fall risk patients  - Consider moving patient to room near nurses station  Outcome: Progressing     Problem: PAIN - ADULT  Goal: Verbalizes/displays adequate comfort level or baseline comfort level  Description: Interventions:  - Encourage patient to monitor pain and request assistance  - Assess pain using appropriate pain scale  - Administer analgesics based on type and severity of pain and evaluate response  - Implement non-pharmacological measures as appropriate and evaluate response  - Consider cultural and social influences on pain and pain management  - Notify physician/advanced practitioner if interventions unsuccessful or patient reports new pain  Outcome: Progressing     Problem: INFECTION - ADULT  Goal: Absence or prevention of progression during hospitalization  Description: INTERVENTIONS:  - Assess and monitor for signs and symptoms of infection  - Monitor lab/diagnostic results  - Monitor all insertion sites, i.e. indwelling lines, tubes, and drains  - Monitor endotracheal if appropriate and nasal secretions for changes in amount and color  - [place] appropriate cooling/warming therapies per order  - Administer medications as ordered  - Instruct and encourage patient and family to use good hand hygiene  technique  - Identify and instruct in appropriate isolation precautions for identified infection/condition  Outcome: Progressing  Goal: Absence of fever/infection during neutropenic period  Description: INTERVENTIONS:  - Monitor WBC    Outcome: Progressing     Problem: SAFETY ADULT  Goal: Patient will remain free of falls  Description: INTERVENTIONS:  - Educate patient/family on patient safety including physical limitations  - Instruct patient to call for assistance with activity   - Consult OT/PT to assist with strengthening/mobility   - Keep Call bell within reach  - Keep bed low and locked with side rails adjusted as appropriate  - Keep care items and personal belongings within reach  - Initiate and maintain comfort rounds  - Make Fall Risk Sign visible to staff  - Offer Toileting every 2 Hours, in advance of need  - Initiate/Maintain bedalarm  - Obtain necessary fall risk management equipment:   - Apply yellow socks and bracelet for high fall risk patients  - Consider moving patient to room near nurses station  Outcome: Progressing  Goal: Maintain or return to baseline ADL function  Description: INTERVENTIONS:  -  Assess patient's ability to carry out ADLs; assess patient's baseline for ADL function and identify physical deficits which impact ability to perform ADLs (bathing, care of mouth/teeth, toileting, grooming, dressing, etc.)  - Assess/evaluate cause of self-care deficits   - Assess range of motion  - Assess patient's mobility; develop plan if impaired  - Assess patient's need for assistive devices and provide as appropriate  - Encourage maximum independence but intervene and supervise when necessary  - Involve family in performance of ADLs  - Assess for home care needs following discharge   - Consider OT consult to assist with ADL evaluation and planning for discharge  - Provide patient education as appropriate  Outcome: Progressing  Goal: Maintains/Returns to pre admission functional level  Description:  INTERVENTIONS:  - Perform AM-PAC 6 Click Basic Mobility/ Daily Activity assessment daily.  - Set and communicate daily mobility goal to care team and patient/family/caregiver.   - Collaborate with rehabilitation services on mobility goals if consulted  - Perform Range of Motion 3 times a day.  - Reposition patient every 3 hours.  - Dangle patient 3 times a day  - Stand patient 3 times a day  - Ambulate patient 3 times a day  - Out of bed to chair 3 times a day   - Out of bed for meals 3 times a day  - Out of bed for toileting  - Record patient progress and toleration of activity level   Outcome: Progressing     Problem: DISCHARGE PLANNING  Goal: Discharge to home or other facility with appropriate resources  Description: INTERVENTIONS:  - Identify barriers to discharge w/patient and caregiver  - Arrange for needed discharge resources and transportation as appropriate  - Identify discharge learning needs (meds, wound care, etc.)  - Arrange for interpretive services to assist at discharge as needed  - Refer to Case Management Department for coordinating discharge planning if the patient needs post-hospital services based on physician/advanced practitioner order or complex needs related to functional status, cognitive ability, or social support system  Outcome: Progressing     Problem: Knowledge Deficit  Goal: Patient/family/caregiver demonstrates understanding of disease process, treatment plan, medications, and discharge instructions  Description: Complete learning assessment and assess knowledge base.  Interventions:  - Provide teaching at level of understanding  - Provide teaching via preferred learning methods  Outcome: Progressing     Problem: Prexisting or High Potential for Compromised Skin Integrity  Goal: Skin integrity is maintained or improved  Description: INTERVENTIONS:  - Identify patients at risk for skin breakdown  - Assess and monitor skin integrity  - Assess and monitor nutrition and hydration  status  - Monitor labs   - Assess for incontinence   - Turn and reposition patient  - Assist with mobility/ambulation  - Relieve pressure over bony prominences  - Avoid friction and shearing  - Provide appropriate hygiene as needed including keeping skin clean and dry  - Evaluate need for skin moisturizer/barrier cream  - Collaborate with interdisciplinary team   - Patient/family teaching  - Consider wound care consult   Outcome: Progressing      3-5x/week

## 2025-01-23 ENCOUNTER — NON-APPOINTMENT (OUTPATIENT)
Age: 60
End: 2025-01-23

## 2025-01-24 ENCOUNTER — NON-APPOINTMENT (OUTPATIENT)
Age: 60
End: 2025-01-24

## 2025-01-24 ENCOUNTER — APPOINTMENT (OUTPATIENT)
Dept: INFECTIOUS DISEASE | Facility: CLINIC | Age: 60
End: 2025-01-24
Payer: MEDICAID

## 2025-01-24 ENCOUNTER — OUTPATIENT (OUTPATIENT)
Dept: OUTPATIENT SERVICES | Facility: HOSPITAL | Age: 60
LOS: 1 days | End: 2025-01-24
Payer: MEDICAID

## 2025-01-24 ENCOUNTER — APPOINTMENT (OUTPATIENT)
Dept: INFECTIOUS DISEASE | Facility: CLINIC | Age: 60
End: 2025-01-24

## 2025-01-24 VITALS
OXYGEN SATURATION: 98 % | HEIGHT: 71 IN | SYSTOLIC BLOOD PRESSURE: 132 MMHG | HEART RATE: 74 BPM | TEMPERATURE: 97.7 F | WEIGHT: 198 LBS | DIASTOLIC BLOOD PRESSURE: 87 MMHG | BODY MASS INDEX: 27.72 KG/M2

## 2025-01-24 DIAGNOSIS — R21 RASH AND OTHER NONSPECIFIC SKIN ERUPTION: ICD-10-CM

## 2025-01-24 DIAGNOSIS — Z21 ASYMPTOMATIC HUMAN IMMUNODEFICIENCY VIRUS [HIV] INFECTION STATUS: ICD-10-CM

## 2025-01-24 DIAGNOSIS — C21.0 MALIGNANT NEOPLASM OF ANUS, UNSPECIFIED: ICD-10-CM

## 2025-01-24 DIAGNOSIS — A53.9 SYPHILIS, UNSPECIFIED: ICD-10-CM

## 2025-01-24 DIAGNOSIS — Z93.3 COLOSTOMY STATUS: Chronic | ICD-10-CM

## 2025-01-24 DIAGNOSIS — Z98.890 OTHER SPECIFIED POSTPROCEDURAL STATES: Chronic | ICD-10-CM

## 2025-01-24 DIAGNOSIS — B20 HUMAN IMMUNODEFICIENCY VIRUS [HIV] DISEASE: ICD-10-CM

## 2025-01-24 DIAGNOSIS — Z92.89 PERSONAL HISTORY OF OTHER MEDICAL TREATMENT: ICD-10-CM

## 2025-01-24 PROCEDURE — G0463: CPT

## 2025-01-24 PROCEDURE — 99215 OFFICE O/P EST HI 40 MIN: CPT

## 2025-01-27 ENCOUNTER — APPOINTMENT (OUTPATIENT)
Dept: INFECTIOUS DISEASE | Facility: CLINIC | Age: 60
End: 2025-01-27
Payer: MEDICAID

## 2025-01-27 PROCEDURE — 97802 MEDICAL NUTRITION INDIV IN: CPT | Mod: 95

## 2025-01-28 NOTE — PROCEDURE NOTE - PROCEDURE FINDINGS AND DETAILS
technically successful CT guided L buttock/pelvic abscess drainage yielding 70cc purulent fluid with placement of a 12Fr locking pigtail catheter. .

## 2025-02-06 ENCOUNTER — OUTPATIENT (OUTPATIENT)
Dept: OUTPATIENT SERVICES | Facility: HOSPITAL | Age: 60
LOS: 1 days | End: 2025-02-06

## 2025-02-06 ENCOUNTER — APPOINTMENT (OUTPATIENT)
Dept: INFECTIOUS DISEASE | Facility: CLINIC | Age: 60
End: 2025-02-06

## 2025-02-06 DIAGNOSIS — Z93.3 COLOSTOMY STATUS: Chronic | ICD-10-CM

## 2025-02-06 DIAGNOSIS — F41.9 ANXIETY DISORDER, UNSPECIFIED: ICD-10-CM

## 2025-02-06 DIAGNOSIS — F33.1 MAJOR DEPRESSIVE DISORDER, RECURRENT, MODERATE: ICD-10-CM

## 2025-02-06 DIAGNOSIS — B20 HUMAN IMMUNODEFICIENCY VIRUS [HIV] DISEASE: ICD-10-CM

## 2025-02-06 PROCEDURE — 99213 OFFICE O/P EST LOW 20 MIN: CPT | Mod: 95

## 2025-02-06 PROCEDURE — G0463: CPT

## 2025-02-06 RX ORDER — ZOLPIDEM TARTRATE 10 MG/1
10 TABLET ORAL
Qty: 30 | Refills: 0 | Status: ACTIVE | COMMUNITY
Start: 2025-02-06 | End: 1900-01-01

## 2025-02-11 NOTE — PROGRESS NOTE ADULT - ASSESSMENT
SNF PROGRESS NOTE      Cc- liver/lung nodules/mass       Patient is a Juan [place] 84 y.o. male  who is being seen at [facility] who presented to hospital with increased weakness and abdominal pain. He was noted to have multiple liver and lung nodules. EGD was performed and showed duodenal ulcer and colonoscopy showed polyps which were biopsied. He was noted to have Pseudomonas UTI for which she was treated in the hospital.     Patient is sitting up in his room and eating breakfast. He denies any pain.         Past Medical History:   Diagnosis Date    Aneurysm artery, pulmonary (HCC) 2022    not surgical    Benign prostatic hyperplasia with lower urinary tract symptoms     Chronic sinusitis     History of left knee replacement 03/15/2022    at [facility]    History of total left knee replacement (TKR) 03/15/2022    HTN (hypertension)     Hx of carpal tunnel syndrome     Obesity     Other fracture of left femur, initial encounter for closed fracture (HCC) 3/14/2022    Primary osteoarthritis of both knees      Aspirin    VS reviewed      Gen- Alert and oriented x 3  Heart- RRR no murmur no LE edema   Lungs- CTA b/l no resp distress RA oxygen   Abd- bs x 4      + payne      Assessment and Plan    Liver and lung masses -- suspect metastatic disease  Liver bx done on 1/17  Another liver bx  done on 2/5  PRN pain meds   Follow up heme onc in 2-3 weeks.   Bronchoscopy to be rescheduled 2/7/25  Pseudomonas UTI   Treated in hospital   Obstructive uropathy   F/u urology   Finasteride   Duodenal Ulcer   Protonix      FELIPE Catalan DO     Electronically signed by: Freda Petit DO on 2/11/2025   56M rectal cancer s/p radiation, prostate cancer s/p radiation, HIV on biktarvy, diverticulosis w/ h/o diverticulitis, anxiety, depression, HTN, chronic constipation presents with uncontrollable rectal pain, bleeding, subjective fevers, chills, diaphoresis, and difficulty ambulating.

## 2025-02-12 NOTE — ASU PREOP CHECKLIST - HEART RATE (BEATS/MIN)
[FreeTextEntry1] : Patient was chronic quiescent ulcerative colitis.  He does not require any treatment at this time.  The 2 polyps that were removed with small hyperplastic polyps.  Patient was reassured.  He will need his surveillance colonoscopy in 2 years.  He will be seen in 1 year in follow-up for his chronic ulcerative colitis.
88

## 2025-02-19 ENCOUNTER — RESULT REVIEW (OUTPATIENT)
Age: 60
End: 2025-02-19

## 2025-02-19 ENCOUNTER — TRANSCRIPTION ENCOUNTER (OUTPATIENT)
Age: 60
End: 2025-02-19

## 2025-02-19 ENCOUNTER — OUTPATIENT (OUTPATIENT)
Dept: OUTPATIENT SERVICES | Facility: HOSPITAL | Age: 60
LOS: 1 days | Discharge: HOME | End: 2025-02-19
Payer: MEDICAID

## 2025-02-19 ENCOUNTER — APPOINTMENT (OUTPATIENT)
Dept: INTERVENTIONAL RADIOLOGY/VASCULAR | Facility: HOSPITAL | Age: 60
End: 2025-02-19

## 2025-02-19 DIAGNOSIS — Z98.890 OTHER SPECIFIED POSTPROCEDURAL STATES: Chronic | ICD-10-CM

## 2025-02-19 DIAGNOSIS — Z96.0 PRESENCE OF UROGENITAL IMPLANTS: Chronic | ICD-10-CM

## 2025-02-19 DIAGNOSIS — Z93.3 COLOSTOMY STATUS: Chronic | ICD-10-CM

## 2025-02-19 DIAGNOSIS — K62.9 DISEASE OF ANUS AND RECTUM, UNSPECIFIED: Chronic | ICD-10-CM

## 2025-02-19 DIAGNOSIS — N13.9 OBSTRUCTIVE AND REFLUX UROPATHY, UNSPECIFIED: ICD-10-CM

## 2025-02-19 PROCEDURE — 50435 EXCHANGE NEPHROSTOMY CATH: CPT | Mod: 50

## 2025-02-19 NOTE — PROCEDURE NOTE - PROCEDURE FINDINGS AND DETAILS
Right and left nephrostomy tube evaluated. Successfully exchanged over a wire for new 8.5F drainage catheters .

## 2025-02-19 NOTE — ASU DISCHARGE PLAN (ADULT/PEDIATRIC) - ASU DC SPECIAL INSTRUCTIONSFT
Nephrostomy Tube Exchange    Discharge Instructions  - You have had nephrostomy tubes exchanged.  - Keep the area clean and dry.  - Do not soak in a tub or pool with the drain, however you may shower with the drain(s) and dressing covered in plastic wrap.  - Do not put traction on the drain(s) and be careful that the drain(s) does not get accidentally dislodged or kinked.  - Record output daily from the drain. Empty the bag as needed.  - You may resume your normal diet.  - You may resume your normal medications.  - It is normal to experience some pain over the site for the next few days. You may take apply ice to the area (20 minutes on, 20 minutes off) and take Tylenol for that pain. Do not take more frequently than every 6 hours and do not exceed more than 3000mg of Tylenol in a 24 hour period.    Notify your primary physician and/or Interventional Radiology IMMEDIATELY if you experience any of the following       - Fever of 101F or 38C       - Chills or Rigors/ Shakes       - Swelling and/or Redness in the area of the puncture site       - Worsening Pain       - Blood soaked bandages or worsening bleeding       - Lightheadedness and/or dizziness upon standing       - Chest Pain/ Tightness       - Shortness of Breath       - Difficulty walking    If you have a problem that you believe requires IMMEDIATE attention, please go to your NEAREST Emergency Room. If you believe your problem can safely wait until you speak to a physician, please call Interventional Radiology for any concerns.    During Normal Weekday Business Hours- You can contact the Interventional Radiology department during normal business hours via telephone.  During Evenings and Weekends- If you need to contact Interventional Radiology during off hours, do so by calling the hospital and requesting to be connected to the Interventional Radiologist on call.

## 2025-02-19 NOTE — ASU DISCHARGE PLAN (ADULT/PEDIATRIC) - FINANCIAL ASSISTANCE
[facility] provides services at a reduced cost to those who are determined to be eligible through [facility]’s financial assistance program. Information regarding [facility]’s financial assistance program can be found by going to [email or web address] or by calling [phone].

## 2025-02-25 ENCOUNTER — APPOINTMENT (OUTPATIENT)
Dept: INFECTIOUS DISEASE | Facility: CLINIC | Age: 60
End: 2025-02-25
Payer: MEDICAID

## 2025-02-25 PROCEDURE — 97803 MED NUTRITION INDIV SUBSEQ: CPT | Mod: 95

## 2025-02-28 ENCOUNTER — RESULT REVIEW (OUTPATIENT)
Age: 60
End: 2025-02-28

## 2025-02-28 ENCOUNTER — OUTPATIENT (OUTPATIENT)
Dept: OUTPATIENT SERVICES | Facility: HOSPITAL | Age: 60
LOS: 1 days | Discharge: HOME | End: 2025-02-28
Payer: MEDICAID

## 2025-02-28 ENCOUNTER — APPOINTMENT (OUTPATIENT)
Dept: INTERVENTIONAL RADIOLOGY/VASCULAR | Facility: HOSPITAL | Age: 60
End: 2025-02-28

## 2025-02-28 ENCOUNTER — TRANSCRIPTION ENCOUNTER (OUTPATIENT)
Age: 60
End: 2025-02-28

## 2025-02-28 DIAGNOSIS — K62.9 DISEASE OF ANUS AND RECTUM, UNSPECIFIED: Chronic | ICD-10-CM

## 2025-02-28 DIAGNOSIS — Z43.6 ENCOUNTER FOR ATTENTION TO OTHER ARTIFICIAL OPENINGS OF URINARY TRACT: ICD-10-CM

## 2025-02-28 DIAGNOSIS — Z96.0 PRESENCE OF UROGENITAL IMPLANTS: Chronic | ICD-10-CM

## 2025-02-28 DIAGNOSIS — Z93.3 COLOSTOMY STATUS: Chronic | ICD-10-CM

## 2025-02-28 DIAGNOSIS — Z98.890 OTHER SPECIFIED POSTPROCEDURAL STATES: Chronic | ICD-10-CM

## 2025-02-28 PROCEDURE — 50435 EXCHANGE NEPHROSTOMY CATH: CPT | Mod: 50

## 2025-02-28 NOTE — ASU DISCHARGE PLAN (ADULT/PEDIATRIC) - NS MD DC FALL RISK RISK
For information on Fall & Injury Prevention, visit: [email or web address] OR  [email or web address] OR  https://www.cdc.gov/steadi/patient.html

## 2025-02-28 NOTE — ASU DISCHARGE PLAN (ADULT/PEDIATRIC) - FINANCIAL ASSISTANCE
[facility] provides services at a reduced cost to those who are determined to be eligible through [facility]’s financial assistance program. Information regarding [facility]’s financial assistance program can be found by going to [email or web address] or by calling [phone].

## 2025-02-28 NOTE — ASU DISCHARGE PLAN (ADULT/PEDIATRIC) - ASU DC SPECIAL INSTRUCTIONSFT
Nephrostomy Check    Discharge Instructions  - You have had a nephrostomy tube exchanged  - Keep the area clean and dry.  - Do not soak in a tub or pool with the drain, however you may shower with the drain and dressing covered in plastic wrap.  - Do not put traction on the drain and be careful that the drain does not get accidentally dislodged or kinked.  - Record output daily from the drain. Empty the bag as needed.  - You may resume your normal diet.  - You may resume your normal medications.  - It is normal to experience some pain over the site for the next few days. You may take apply ice to the area (20 minutes on, 20 minutes off) and take Tylenol for that pain. Do not take more frequently than every 6 hours and do not exceed more than 3000mg of Tylenol in a 24 hour period.    Notify your primary physician and/or Interventional Radiology IMMEDIATELY if you experience any of the following       - Fever of 100.4F  or 38C       - Chills or Rigors/ Shakes       - Swelling and/or Redness in the area of the puncture site       - Worsening Pain       - Blood soaked bandages or worsening bleeding       - Lightheadedness and/or dizziness upon standing       - Chest Pain/ Tightness       - Shortness of Breath       - Difficulty walking    If you have a problem that you believe requires IMMEDIATE attention, please go to your NEAREST Emergency Room. If you believe your problem can safely wait until you speak to a physician, please call Interventional Radiology for any concerns.    During Normal Weekday Business Hours- You can contact the Interventional Radiology department during normal business hours via telephone.  During Evenings and Weekends- If you need to contact Interventional Radiology during off hours, do so by calling the hospital and requesting to be connected to the Interventional Radiologist on call. range of motion is not limited and there is no muscle tenderness.

## 2025-02-28 NOTE — PRE PROCEDURE NOTE - PRE PROCEDURE EVALUATION
Interventional Radiology    HPI: 59y Male with PMHx HLD, anal cancer s/p chemo/radiotherapy, colon cancer s/p coloctomy, prostate cancer received radiation therapy, HIV on HAART. Bilateral nephrostomy tubes last exchanged 9 days ago, now presents for no urine draining from left tube, and decreased drain from right tube.     Allergies: No Known Allergies    Medications (Abx/Cardiac/Anticoagulation/Blood Products)      Data:    T(C): --  HR: --  BP: --  RR: --  SpO2: --    Exam  General: No acute distress  Chest: Non labored breathing  Abdomen: Non-distended  Extremities: No swelling, warm          Imaging: Reviewed with Dr. Donahue    Plan: 59y Male presents for nephrostomy tube evaluation  -Risks/Benefits/alternatives explained with the patient and/or healthcare proxy and witnessed informed consent obtained.

## 2025-02-28 NOTE — PROCEDURE NOTE - PLAN
imaging reviewed with attending. Plan for upsize per Dr. Donahue.   Reinforced drain care and flushing instructions to patient.   Routine tube exchange in 2-3 months, with possible conversion to PCNUs if persistent issues with tube being pulled back.

## 2025-02-28 NOTE — PROCEDURE NOTE - PROCEDURE FINDINGS AND DETAILS
Initial evaluation shows left nephrostomy tube clogged with sediment. Preliminary fluoroscopy guidance shows left tube, right tube pulled back into renal calyx in appropriate position. Successful over wire exchange and upsize of bilateral tubes to 10F MPDC nephrostomy tubes. Positioning confirmed with contrast injection. Tubes sutured in place and attached to gravity drainage bag. No complications. Full report to follow.

## 2025-03-06 ENCOUNTER — APPOINTMENT (OUTPATIENT)
Dept: INFECTIOUS DISEASE | Facility: CLINIC | Age: 60
End: 2025-03-06
Payer: MEDICAID

## 2025-03-06 ENCOUNTER — OUTPATIENT (OUTPATIENT)
Dept: OUTPATIENT SERVICES | Facility: HOSPITAL | Age: 60
LOS: 1 days | End: 2025-03-06
Payer: MEDICAID

## 2025-03-06 DIAGNOSIS — B20 HUMAN IMMUNODEFICIENCY VIRUS [HIV] DISEASE: ICD-10-CM

## 2025-03-06 DIAGNOSIS — Z96.0 PRESENCE OF UROGENITAL IMPLANTS: Chronic | ICD-10-CM

## 2025-03-06 DIAGNOSIS — K62.9 DISEASE OF ANUS AND RECTUM, UNSPECIFIED: Chronic | ICD-10-CM

## 2025-03-06 DIAGNOSIS — Z93.3 COLOSTOMY STATUS: Chronic | ICD-10-CM

## 2025-03-06 DIAGNOSIS — Z98.890 OTHER SPECIFIED POSTPROCEDURAL STATES: Chronic | ICD-10-CM

## 2025-03-06 PROCEDURE — G0463: CPT

## 2025-03-06 PROCEDURE — 99214 OFFICE O/P EST MOD 30 MIN: CPT | Mod: 25

## 2025-03-10 DIAGNOSIS — F33.1 MAJOR DEPRESSIVE DISORDER, RECURRENT, MODERATE: ICD-10-CM

## 2025-03-11 ENCOUNTER — INPATIENT (INPATIENT)
Facility: HOSPITAL | Age: 60
LOS: 3 days | Discharge: HOME | End: 2025-03-15
Attending: INTERNAL MEDICINE | Admitting: INTERNAL MEDICINE
Payer: MEDICAID

## 2025-03-11 ENCOUNTER — APPOINTMENT (OUTPATIENT)
Dept: DERMATOLOGY | Facility: CLINIC | Age: 60
End: 2025-03-11

## 2025-03-11 VITALS
HEIGHT: 71 IN | WEIGHT: 199.96 LBS | HEART RATE: 75 BPM | RESPIRATION RATE: 20 BRPM | DIASTOLIC BLOOD PRESSURE: 76 MMHG | TEMPERATURE: 98 F | SYSTOLIC BLOOD PRESSURE: 107 MMHG | OXYGEN SATURATION: 97 %

## 2025-03-11 DIAGNOSIS — I10 ESSENTIAL (PRIMARY) HYPERTENSION: ICD-10-CM

## 2025-03-11 DIAGNOSIS — B20 HUMAN IMMUNODEFICIENCY VIRUS [HIV] DISEASE: ICD-10-CM

## 2025-03-11 DIAGNOSIS — Z96.0 PRESENCE OF UROGENITAL IMPLANTS: Chronic | ICD-10-CM

## 2025-03-11 DIAGNOSIS — E78.5 HYPERLIPIDEMIA, UNSPECIFIED: ICD-10-CM

## 2025-03-11 DIAGNOSIS — Z93.3 COLOSTOMY STATUS: Chronic | ICD-10-CM

## 2025-03-11 DIAGNOSIS — N39.0 URINARY TRACT INFECTION, SITE NOT SPECIFIED: ICD-10-CM

## 2025-03-11 DIAGNOSIS — K62.9 DISEASE OF ANUS AND RECTUM, UNSPECIFIED: Chronic | ICD-10-CM

## 2025-03-11 DIAGNOSIS — T83.098A OTHER MECHANICAL COMPLICATION OF OTHER URINARY CATHETER, INITIAL ENCOUNTER: ICD-10-CM

## 2025-03-11 DIAGNOSIS — Z93.6 OTHER ARTIFICIAL OPENINGS OF URINARY TRACT STATUS: Chronic | ICD-10-CM

## 2025-03-11 DIAGNOSIS — Z98.890 OTHER SPECIFIED POSTPROCEDURAL STATES: Chronic | ICD-10-CM

## 2025-03-11 LAB
ALBUMIN SERPL ELPH-MCNC: 3.3 G/DL — SIGNIFICANT CHANGE UP (ref 3.3–5)
ALP SERPL-CCNC: 98 U/L — SIGNIFICANT CHANGE UP (ref 40–120)
ALT FLD-CCNC: 17 U/L — SIGNIFICANT CHANGE UP (ref 12–78)
ANION GAP SERPL CALC-SCNC: 3 MMOL/L — LOW (ref 5–17)
APPEARANCE UR: ABNORMAL
APTT BLD: 36 SEC — HIGH (ref 24.5–35.6)
AST SERPL-CCNC: 12 U/L — LOW (ref 15–37)
BACTERIA # UR AUTO: ABNORMAL /HPF
BASOPHILS # BLD AUTO: 0.06 K/UL — SIGNIFICANT CHANGE UP (ref 0–0.2)
BASOPHILS NFR BLD AUTO: 0.7 % — SIGNIFICANT CHANGE UP (ref 0–2)
BILIRUB SERPL-MCNC: 0.1 MG/DL — LOW (ref 0.2–1.2)
BILIRUB UR-MCNC: NEGATIVE — SIGNIFICANT CHANGE UP
BLD GP AB SCN SERPL QL: SIGNIFICANT CHANGE UP
BUN SERPL-MCNC: 18 MG/DL — SIGNIFICANT CHANGE UP (ref 7–23)
CALCIUM SERPL-MCNC: 9.6 MG/DL — SIGNIFICANT CHANGE UP (ref 8.5–10.1)
CHLORIDE SERPL-SCNC: 104 MMOL/L — SIGNIFICANT CHANGE UP (ref 96–108)
CO2 SERPL-SCNC: 30 MMOL/L — SIGNIFICANT CHANGE UP (ref 22–31)
COLOR SPEC: YELLOW — SIGNIFICANT CHANGE UP
COMMENT - URINE: SIGNIFICANT CHANGE UP
CREAT SERPL-MCNC: 1.07 MG/DL — SIGNIFICANT CHANGE UP (ref 0.5–1.3)
DIFF PNL FLD: ABNORMAL
EGFR: 80 ML/MIN/1.73M2 — SIGNIFICANT CHANGE UP
EGFR: 80 ML/MIN/1.73M2 — SIGNIFICANT CHANGE UP
EOSINOPHIL # BLD AUTO: 0.05 K/UL — SIGNIFICANT CHANGE UP (ref 0–0.5)
EOSINOPHIL NFR BLD AUTO: 0.6 % — SIGNIFICANT CHANGE UP (ref 0–6)
GLUCOSE SERPL-MCNC: 112 MG/DL — HIGH (ref 70–99)
GLUCOSE UR QL: NEGATIVE MG/DL — SIGNIFICANT CHANGE UP
HCT VFR BLD CALC: 38.1 % — LOW (ref 39–50)
HGB BLD-MCNC: 12.9 G/DL — LOW (ref 13–17)
IMM GRANULOCYTES NFR BLD AUTO: 0.5 % — SIGNIFICANT CHANGE UP (ref 0–0.9)
INR BLD: 1.05 RATIO — SIGNIFICANT CHANGE UP (ref 0.85–1.16)
KETONES UR-MCNC: NEGATIVE MG/DL — SIGNIFICANT CHANGE UP
LEUKOCYTE ESTERASE UR-ACNC: ABNORMAL
LIDOCAIN IGE QN: 23 U/L — SIGNIFICANT CHANGE UP (ref 13–75)
LYMPHOCYTES # BLD AUTO: 2.55 K/UL — SIGNIFICANT CHANGE UP (ref 1–3.3)
LYMPHOCYTES # BLD AUTO: 29.5 % — SIGNIFICANT CHANGE UP (ref 13–44)
MCHC RBC-ENTMCNC: 30.1 PG — SIGNIFICANT CHANGE UP (ref 27–34)
MCHC RBC-ENTMCNC: 33.9 G/DL — SIGNIFICANT CHANGE UP (ref 32–36)
MCV RBC AUTO: 88.8 FL — SIGNIFICANT CHANGE UP (ref 80–100)
MONOCYTES # BLD AUTO: 1.04 K/UL — HIGH (ref 0–0.9)
MONOCYTES NFR BLD AUTO: 12 % — SIGNIFICANT CHANGE UP (ref 2–14)
NEUTROPHILS # BLD AUTO: 4.91 K/UL — SIGNIFICANT CHANGE UP (ref 1.8–7.4)
NEUTROPHILS NFR BLD AUTO: 56.7 % — SIGNIFICANT CHANGE UP (ref 43–77)
NITRITE UR-MCNC: NEGATIVE — SIGNIFICANT CHANGE UP
NRBC BLD AUTO-RTO: 0 /100 WBCS — SIGNIFICANT CHANGE UP (ref 0–0)
PH UR: 7 — SIGNIFICANT CHANGE UP (ref 5–8)
PLATELET # BLD AUTO: 366 K/UL — SIGNIFICANT CHANGE UP (ref 150–400)
POTASSIUM SERPL-MCNC: 4.4 MMOL/L — SIGNIFICANT CHANGE UP (ref 3.5–5.3)
POTASSIUM SERPL-SCNC: 4.4 MMOL/L — SIGNIFICANT CHANGE UP (ref 3.5–5.3)
PROT SERPL-MCNC: 7.4 GM/DL — SIGNIFICANT CHANGE UP (ref 6–8.3)
PROT UR-MCNC: 30 MG/DL
PROTHROM AB SERPL-ACNC: 12.2 SEC — SIGNIFICANT CHANGE UP (ref 9.9–13.4)
RBC # BLD: 4.29 M/UL — SIGNIFICANT CHANGE UP (ref 4.2–5.8)
RBC # FLD: 15 % — HIGH (ref 10.3–14.5)
RBC CASTS # UR COMP ASSIST: SIGNIFICANT CHANGE UP /HPF (ref 0–4)
SODIUM SERPL-SCNC: 137 MMOL/L — SIGNIFICANT CHANGE UP (ref 135–145)
SP GR SPEC: 1.01 — SIGNIFICANT CHANGE UP (ref 1–1.03)
UROBILINOGEN FLD QL: 0.2 MG/DL — SIGNIFICANT CHANGE UP (ref 0.2–1)
WBC # BLD: 8.65 K/UL — SIGNIFICANT CHANGE UP (ref 3.8–10.5)
WBC # FLD AUTO: 8.65 K/UL — SIGNIFICANT CHANGE UP (ref 3.8–10.5)
WBC UR QL: ABNORMAL /HPF (ref 0–5)

## 2025-03-11 PROCEDURE — 99222 1ST HOSP IP/OBS MODERATE 55: CPT

## 2025-03-11 PROCEDURE — 99285 EMERGENCY DEPT VISIT HI MDM: CPT

## 2025-03-11 PROCEDURE — 74176 CT ABD & PELVIS W/O CONTRAST: CPT | Mod: 26

## 2025-03-11 RX ORDER — MELATONIN 5 MG
3 TABLET ORAL AT BEDTIME
Refills: 0 | Status: DISCONTINUED | OUTPATIENT
Start: 2025-03-11 | End: 2025-03-15

## 2025-03-11 RX ORDER — BICTEGRAVIR SODIUM, EMTRICITABINE, AND TENOFOVIR ALAFENAMIDE FUMARATE 50; 200; 25 MG/1; MG/1; MG/1
1 TABLET ORAL DAILY
Refills: 0 | Status: DISCONTINUED | OUTPATIENT
Start: 2025-03-12 | End: 2025-03-15

## 2025-03-11 RX ORDER — METOPROLOL SUCCINATE 50 MG/1
100 TABLET, EXTENDED RELEASE ORAL DAILY
Refills: 0 | Status: DISCONTINUED | OUTPATIENT
Start: 2025-03-12 | End: 2025-03-15

## 2025-03-11 RX ORDER — ACETAMINOPHEN 500 MG/5ML
650 LIQUID (ML) ORAL EVERY 6 HOURS
Refills: 0 | Status: DISCONTINUED | OUTPATIENT
Start: 2025-03-11 | End: 2025-03-15

## 2025-03-11 RX ORDER — EZETIMIBE 10 MG/1
10 TABLET ORAL
Refills: 0 | Status: DISCONTINUED | OUTPATIENT
Start: 2025-03-11 | End: 2025-03-15

## 2025-03-11 RX ORDER — DILTIAZEM HYDROCHLORIDE 240 MG/1
240 TABLET, EXTENDED RELEASE ORAL DAILY
Refills: 0 | Status: DISCONTINUED | OUTPATIENT
Start: 2025-03-12 | End: 2025-03-15

## 2025-03-11 RX ORDER — MAGNESIUM, ALUMINUM HYDROXIDE 200-200 MG
30 TABLET,CHEWABLE ORAL EVERY 4 HOURS
Refills: 0 | Status: DISCONTINUED | OUTPATIENT
Start: 2025-03-11 | End: 2025-03-12

## 2025-03-11 RX ORDER — ONDANSETRON HCL/PF 4 MG/2 ML
4 VIAL (ML) INJECTION EVERY 8 HOURS
Refills: 0 | Status: DISCONTINUED | OUTPATIENT
Start: 2025-03-11 | End: 2025-03-15

## 2025-03-11 RX ORDER — PIPERACILLIN-TAZO-DEXTROSE,ISO 3.375G/5
3.38 IV SOLUTION, PIGGYBACK PREMIX FROZEN(ML) INTRAVENOUS EVERY 8 HOURS
Refills: 0 | Status: DISCONTINUED | OUTPATIENT
Start: 2025-03-11 | End: 2025-03-12

## 2025-03-11 RX ORDER — TRAZODONE HCL 100 MG
50 TABLET ORAL
Refills: 0 | Status: DISCONTINUED | OUTPATIENT
Start: 2025-03-11 | End: 2025-03-15

## 2025-03-11 RX ORDER — BUPROPION HYDROBROMIDE 522 MG/1
150 TABLET, EXTENDED RELEASE ORAL DAILY
Refills: 0 | Status: DISCONTINUED | OUTPATIENT
Start: 2025-03-12 | End: 2025-03-15

## 2025-03-11 RX ORDER — CLONAZEPAM 0.5 MG/1
1 TABLET ORAL
Refills: 0 | Status: DISCONTINUED | OUTPATIENT
Start: 2025-03-11 | End: 2025-03-15

## 2025-03-11 RX ORDER — PIPERACILLIN-TAZO-DEXTROSE,ISO 3.375G/5
3.38 IV SOLUTION, PIGGYBACK PREMIX FROZEN(ML) INTRAVENOUS ONCE
Refills: 0 | Status: COMPLETED | OUTPATIENT
Start: 2025-03-11 | End: 2025-03-11

## 2025-03-11 RX ADMIN — Medication 12.5 GRAM(S): at 21:29

## 2025-03-11 RX ADMIN — EZETIMIBE 10 MILLIGRAM(S): 10 TABLET ORAL at 21:29

## 2025-03-11 RX ADMIN — Medication 50 MILLIGRAM(S): at 21:28

## 2025-03-11 RX ADMIN — CLONAZEPAM 1 MILLIGRAM(S): 0.5 TABLET ORAL at 21:28

## 2025-03-11 RX ADMIN — Medication 100 GRAM(S): at 16:37

## 2025-03-12 PROBLEM — E78.5 HYPERLIPIDEMIA, UNSPECIFIED: Chronic | Status: INACTIVE | Noted: 2021-03-30 | Resolved: 2025-03-11

## 2025-03-12 PROBLEM — N40.0 BENIGN PROSTATIC HYPERPLASIA WITHOUT LOWER URINARY TRACT SYMPTOMS: Chronic | Status: INACTIVE | Noted: 2023-12-01 | Resolved: 2025-03-11

## 2025-03-12 LAB
ALBUMIN SERPL ELPH-MCNC: 3.1 G/DL — LOW (ref 3.3–5)
ALP SERPL-CCNC: 93 U/L — SIGNIFICANT CHANGE UP (ref 40–120)
ALT FLD-CCNC: 13 U/L — SIGNIFICANT CHANGE UP (ref 12–78)
ANION GAP SERPL CALC-SCNC: 5 MMOL/L — SIGNIFICANT CHANGE UP (ref 5–17)
AST SERPL-CCNC: 9 U/L — LOW (ref 15–37)
BILIRUB SERPL-MCNC: 0.3 MG/DL — SIGNIFICANT CHANGE UP (ref 0.2–1.2)
BUN SERPL-MCNC: 15 MG/DL — SIGNIFICANT CHANGE UP (ref 7–23)
CALCIUM SERPL-MCNC: 9.2 MG/DL — SIGNIFICANT CHANGE UP (ref 8.5–10.1)
CHLORIDE SERPL-SCNC: 108 MMOL/L — SIGNIFICANT CHANGE UP (ref 96–108)
CO2 SERPL-SCNC: 28 MMOL/L — SIGNIFICANT CHANGE UP (ref 22–31)
CREAT SERPL-MCNC: 0.96 MG/DL — SIGNIFICANT CHANGE UP (ref 0.5–1.3)
EGFR: 92 ML/MIN/1.73M2 — SIGNIFICANT CHANGE UP
EGFR: 92 ML/MIN/1.73M2 — SIGNIFICANT CHANGE UP
GLUCOSE SERPL-MCNC: 95 MG/DL — SIGNIFICANT CHANGE UP (ref 70–99)
HCT VFR BLD CALC: 38.9 % — LOW (ref 39–50)
HGB BLD-MCNC: 12.9 G/DL — LOW (ref 13–17)
MAGNESIUM SERPL-MCNC: 2.1 MG/DL — SIGNIFICANT CHANGE UP (ref 1.6–2.6)
MCHC RBC-ENTMCNC: 29.5 PG — SIGNIFICANT CHANGE UP (ref 27–34)
MCHC RBC-ENTMCNC: 33.2 G/DL — SIGNIFICANT CHANGE UP (ref 32–36)
MCV RBC AUTO: 88.8 FL — SIGNIFICANT CHANGE UP (ref 80–100)
NRBC BLD AUTO-RTO: 0 /100 WBCS — SIGNIFICANT CHANGE UP (ref 0–0)
PHOSPHATE SERPL-MCNC: 3.1 MG/DL — SIGNIFICANT CHANGE UP (ref 2.5–4.5)
PLATELET # BLD AUTO: 335 K/UL — SIGNIFICANT CHANGE UP (ref 150–400)
POTASSIUM SERPL-MCNC: 3.9 MMOL/L — SIGNIFICANT CHANGE UP (ref 3.5–5.3)
POTASSIUM SERPL-SCNC: 3.9 MMOL/L — SIGNIFICANT CHANGE UP (ref 3.5–5.3)
PROT SERPL-MCNC: 7.3 GM/DL — SIGNIFICANT CHANGE UP (ref 6–8.3)
RBC # BLD: 4.38 M/UL — SIGNIFICANT CHANGE UP (ref 4.2–5.8)
RBC # FLD: 14.8 % — HIGH (ref 10.3–14.5)
SODIUM SERPL-SCNC: 141 MMOL/L — SIGNIFICANT CHANGE UP (ref 135–145)
WBC # BLD: 7.04 K/UL — SIGNIFICANT CHANGE UP (ref 3.8–10.5)
WBC # FLD AUTO: 7.04 K/UL — SIGNIFICANT CHANGE UP (ref 3.8–10.5)

## 2025-03-12 PROCEDURE — 99232 SBSQ HOSP IP/OBS MODERATE 35: CPT

## 2025-03-12 PROCEDURE — 50434 CONVERT NEPHROSTOMY CATHETER: CPT | Mod: 50

## 2025-03-12 RX ORDER — ACETAMINOPHEN 500 MG/5ML
1000 LIQUID (ML) ORAL ONCE
Refills: 0 | Status: COMPLETED | OUTPATIENT
Start: 2025-03-12 | End: 2025-03-12

## 2025-03-12 RX ORDER — CEFEPIME 2 G/20ML
2000 INJECTION, POWDER, FOR SOLUTION INTRAVENOUS EVERY 8 HOURS
Refills: 0 | Status: COMPLETED | OUTPATIENT
Start: 2025-03-12 | End: 2025-03-15

## 2025-03-12 RX ORDER — SODIUM CHLORIDE 9 G/1000ML
1000 INJECTION, SOLUTION INTRAVENOUS
Refills: 0 | Status: DISCONTINUED | OUTPATIENT
Start: 2025-03-12 | End: 2025-03-12

## 2025-03-12 RX ORDER — FENTANYL CITRATE-0.9 % NACL/PF 100MCG/2ML
25 SYRINGE (ML) INTRAVENOUS
Refills: 0 | Status: DISCONTINUED | OUTPATIENT
Start: 2025-03-12 | End: 2025-03-12

## 2025-03-12 RX ORDER — ACETAMINOPHEN 500 MG/5ML
1000 LIQUID (ML) ORAL ONCE
Refills: 0 | Status: DISCONTINUED | OUTPATIENT
Start: 2025-03-12 | End: 2025-03-12

## 2025-03-12 RX ADMIN — CLONAZEPAM 1 MILLIGRAM(S): 0.5 TABLET ORAL at 21:06

## 2025-03-12 RX ADMIN — CEFEPIME 100 MILLIGRAM(S): 2 INJECTION, POWDER, FOR SOLUTION INTRAVENOUS at 21:11

## 2025-03-12 RX ADMIN — Medication 1000 MILLIGRAM(S): at 19:35

## 2025-03-12 RX ADMIN — Medication 4 MILLIGRAM(S): at 22:20

## 2025-03-12 RX ADMIN — Medication 400 MILLIGRAM(S): at 19:26

## 2025-03-12 RX ADMIN — Medication 1000 MILLIGRAM(S): at 20:21

## 2025-03-12 RX ADMIN — Medication 25 MICROGRAM(S): at 18:56

## 2025-03-12 RX ADMIN — Medication 25 MICROGRAM(S): at 18:36

## 2025-03-12 RX ADMIN — Medication 4 MILLIGRAM(S): at 21:20

## 2025-03-12 RX ADMIN — EZETIMIBE 10 MILLIGRAM(S): 10 TABLET ORAL at 21:07

## 2025-03-12 RX ADMIN — Medication 25 MICROGRAM(S): at 18:55

## 2025-03-12 RX ADMIN — Medication 12.5 GRAM(S): at 13:02

## 2025-03-12 RX ADMIN — Medication 400 MILLIGRAM(S): at 19:21

## 2025-03-12 RX ADMIN — Medication 25 MICROGRAM(S): at 19:25

## 2025-03-12 RX ADMIN — SODIUM CHLORIDE 75 MILLILITER(S): 9 INJECTION, SOLUTION INTRAVENOUS at 18:35

## 2025-03-12 RX ADMIN — BICTEGRAVIR SODIUM, EMTRICITABINE, AND TENOFOVIR ALAFENAMIDE FUMARATE 1 TABLET(S): 50; 200; 25 TABLET ORAL at 11:55

## 2025-03-12 RX ADMIN — Medication 3 MILLIGRAM(S): at 21:06

## 2025-03-12 RX ADMIN — Medication 50 MILLIGRAM(S): at 21:06

## 2025-03-12 RX ADMIN — BUPROPION HYDROBROMIDE 150 MILLIGRAM(S): 522 TABLET, EXTENDED RELEASE ORAL at 11:55

## 2025-03-13 LAB
-  AMOXICILLIN/CLAVULANIC ACID: SIGNIFICANT CHANGE UP
-  AMPICILLIN/SULBACTAM: SIGNIFICANT CHANGE UP
-  AMPICILLIN: SIGNIFICANT CHANGE UP
-  AZTREONAM: SIGNIFICANT CHANGE UP
-  CEFAZOLIN: SIGNIFICANT CHANGE UP
-  CEFEPIME: SIGNIFICANT CHANGE UP
-  CEFOXITIN: SIGNIFICANT CHANGE UP
-  CEFTRIAXONE: SIGNIFICANT CHANGE UP
-  CEFUROXIME: SIGNIFICANT CHANGE UP
-  CIPROFLOXACIN: SIGNIFICANT CHANGE UP
-  ERTAPENEM: SIGNIFICANT CHANGE UP
-  GENTAMICIN: SIGNIFICANT CHANGE UP
-  LEVOFLOXACIN: SIGNIFICANT CHANGE UP
-  MEROPENEM: SIGNIFICANT CHANGE UP
-  NITROFURANTOIN: SIGNIFICANT CHANGE UP
-  PIPERACILLIN/TAZOBACTAM: SIGNIFICANT CHANGE UP
-  TOBRAMYCIN: SIGNIFICANT CHANGE UP
-  TRIMETHOPRIM/SULFAMETHOXAZOLE: SIGNIFICANT CHANGE UP
CULTURE RESULTS: ABNORMAL
METHOD TYPE: SIGNIFICANT CHANGE UP
ORGANISM # SPEC MICROSCOPIC CNT: ABNORMAL
ORGANISM # SPEC MICROSCOPIC CNT: SIGNIFICANT CHANGE UP
SPECIMEN SOURCE: SIGNIFICANT CHANGE UP

## 2025-03-13 PROCEDURE — 99232 SBSQ HOSP IP/OBS MODERATE 35: CPT

## 2025-03-13 RX ADMIN — CEFEPIME 100 MILLIGRAM(S): 2 INJECTION, POWDER, FOR SOLUTION INTRAVENOUS at 06:00

## 2025-03-13 RX ADMIN — EZETIMIBE 10 MILLIGRAM(S): 10 TABLET ORAL at 21:43

## 2025-03-13 RX ADMIN — Medication 2 MILLIGRAM(S): at 20:55

## 2025-03-13 RX ADMIN — Medication 3 MILLIGRAM(S): at 21:44

## 2025-03-13 RX ADMIN — DILTIAZEM HYDROCHLORIDE 240 MILLIGRAM(S): 240 TABLET, EXTENDED RELEASE ORAL at 06:00

## 2025-03-13 RX ADMIN — CLONAZEPAM 1 MILLIGRAM(S): 0.5 TABLET ORAL at 21:43

## 2025-03-13 RX ADMIN — Medication 2 MILLIGRAM(S): at 19:55

## 2025-03-13 RX ADMIN — Medication 2 MILLIGRAM(S): at 15:42

## 2025-03-13 RX ADMIN — Medication 4 MILLIGRAM(S): at 10:15

## 2025-03-13 RX ADMIN — Medication 4 MILLIGRAM(S): at 11:15

## 2025-03-13 RX ADMIN — Medication 4 MILLIGRAM(S): at 06:01

## 2025-03-13 RX ADMIN — CEFEPIME 100 MILLIGRAM(S): 2 INJECTION, POWDER, FOR SOLUTION INTRAVENOUS at 21:44

## 2025-03-13 RX ADMIN — METOPROLOL SUCCINATE 100 MILLIGRAM(S): 50 TABLET, EXTENDED RELEASE ORAL at 05:59

## 2025-03-13 RX ADMIN — Medication 50 MILLIGRAM(S): at 21:43

## 2025-03-13 RX ADMIN — Medication 4 MILLIGRAM(S): at 00:41

## 2025-03-13 RX ADMIN — BICTEGRAVIR SODIUM, EMTRICITABINE, AND TENOFOVIR ALAFENAMIDE FUMARATE 1 TABLET(S): 50; 200; 25 TABLET ORAL at 13:30

## 2025-03-13 RX ADMIN — Medication 4 MILLIGRAM(S): at 01:41

## 2025-03-13 RX ADMIN — Medication 2 MILLIGRAM(S): at 14:52

## 2025-03-13 RX ADMIN — BUPROPION HYDROBROMIDE 150 MILLIGRAM(S): 522 TABLET, EXTENDED RELEASE ORAL at 13:30

## 2025-03-13 RX ADMIN — CEFEPIME 100 MILLIGRAM(S): 2 INJECTION, POWDER, FOR SOLUTION INTRAVENOUS at 14:51

## 2025-03-13 RX ADMIN — Medication 4 MILLIGRAM(S): at 07:01

## 2025-03-13 NOTE — ASU PATIENT PROFILE, ADULT - TEACHING/LEARNING CULTURAL CONSIDERATIONS
PT initial evaluation received and chart review completed. Pt agreeable to participate in PT evaluation./yes none

## 2025-03-14 LAB
HCT VFR BLD CALC: 40.3 % — SIGNIFICANT CHANGE UP (ref 39–50)
HGB BLD-MCNC: 13.5 G/DL — SIGNIFICANT CHANGE UP (ref 13–17)
MCHC RBC-ENTMCNC: 29.2 PG — SIGNIFICANT CHANGE UP (ref 27–34)
MCHC RBC-ENTMCNC: 33.5 G/DL — SIGNIFICANT CHANGE UP (ref 32–36)
MCV RBC AUTO: 87 FL — SIGNIFICANT CHANGE UP (ref 80–100)
NRBC BLD AUTO-RTO: 0 /100 WBCS — SIGNIFICANT CHANGE UP (ref 0–0)
PLATELET # BLD AUTO: 338 K/UL — SIGNIFICANT CHANGE UP (ref 150–400)
RBC # BLD: 4.63 M/UL — SIGNIFICANT CHANGE UP (ref 4.2–5.8)
RBC # FLD: 14.4 % — SIGNIFICANT CHANGE UP (ref 10.3–14.5)
WBC # BLD: 11.59 K/UL — HIGH (ref 3.8–10.5)
WBC # FLD AUTO: 11.59 K/UL — HIGH (ref 3.8–10.5)

## 2025-03-14 PROCEDURE — 99232 SBSQ HOSP IP/OBS MODERATE 35: CPT

## 2025-03-14 PROCEDURE — 74018 RADEX ABDOMEN 1 VIEW: CPT | Mod: 26

## 2025-03-14 RX ADMIN — CEFEPIME 100 MILLIGRAM(S): 2 INJECTION, POWDER, FOR SOLUTION INTRAVENOUS at 05:29

## 2025-03-14 RX ADMIN — Medication 2 MILLIGRAM(S): at 05:28

## 2025-03-14 RX ADMIN — CLONAZEPAM 1 MILLIGRAM(S): 0.5 TABLET ORAL at 21:55

## 2025-03-14 RX ADMIN — METOPROLOL SUCCINATE 100 MILLIGRAM(S): 50 TABLET, EXTENDED RELEASE ORAL at 05:28

## 2025-03-14 RX ADMIN — DILTIAZEM HYDROCHLORIDE 240 MILLIGRAM(S): 240 TABLET, EXTENDED RELEASE ORAL at 05:28

## 2025-03-14 RX ADMIN — Medication 2 MILLIGRAM(S): at 09:43

## 2025-03-14 RX ADMIN — Medication 50 MILLIGRAM(S): at 21:55

## 2025-03-14 RX ADMIN — Medication 2 MILLIGRAM(S): at 11:19

## 2025-03-14 RX ADMIN — CEFEPIME 100 MILLIGRAM(S): 2 INJECTION, POWDER, FOR SOLUTION INTRAVENOUS at 14:59

## 2025-03-14 RX ADMIN — Medication 2 MILLIGRAM(S): at 06:28

## 2025-03-14 RX ADMIN — Medication 2 MILLIGRAM(S): at 22:37

## 2025-03-14 RX ADMIN — EZETIMIBE 10 MILLIGRAM(S): 10 TABLET ORAL at 21:55

## 2025-03-14 RX ADMIN — Medication 2 MILLIGRAM(S): at 23:27

## 2025-03-14 RX ADMIN — BUPROPION HYDROBROMIDE 150 MILLIGRAM(S): 522 TABLET, EXTENDED RELEASE ORAL at 11:19

## 2025-03-14 RX ADMIN — CEFEPIME 100 MILLIGRAM(S): 2 INJECTION, POWDER, FOR SOLUTION INTRAVENOUS at 21:55

## 2025-03-14 RX ADMIN — BICTEGRAVIR SODIUM, EMTRICITABINE, AND TENOFOVIR ALAFENAMIDE FUMARATE 1 TABLET(S): 50; 200; 25 TABLET ORAL at 11:19

## 2025-03-15 ENCOUNTER — TRANSCRIPTION ENCOUNTER (OUTPATIENT)
Age: 60
End: 2025-03-15

## 2025-03-15 VITALS
HEART RATE: 95 BPM | SYSTOLIC BLOOD PRESSURE: 122 MMHG | DIASTOLIC BLOOD PRESSURE: 87 MMHG | RESPIRATION RATE: 17 BRPM | OXYGEN SATURATION: 96 % | TEMPERATURE: 99 F

## 2025-03-15 LAB
ANION GAP SERPL CALC-SCNC: 2 MMOL/L — LOW (ref 5–17)
BUN SERPL-MCNC: 19 MG/DL — SIGNIFICANT CHANGE UP (ref 7–23)
CALCIUM SERPL-MCNC: 9 MG/DL — SIGNIFICANT CHANGE UP (ref 8.5–10.1)
CHLORIDE SERPL-SCNC: 108 MMOL/L — SIGNIFICANT CHANGE UP (ref 96–108)
CO2 SERPL-SCNC: 28 MMOL/L — SIGNIFICANT CHANGE UP (ref 22–31)
CREAT SERPL-MCNC: 0.83 MG/DL — SIGNIFICANT CHANGE UP (ref 0.5–1.3)
EGFR: 101 ML/MIN/1.73M2 — SIGNIFICANT CHANGE UP
EGFR: 101 ML/MIN/1.73M2 — SIGNIFICANT CHANGE UP
GLUCOSE SERPL-MCNC: 130 MG/DL — HIGH (ref 70–99)
HCT VFR BLD CALC: 39.1 % — SIGNIFICANT CHANGE UP (ref 39–50)
HGB BLD-MCNC: 13.4 G/DL — SIGNIFICANT CHANGE UP (ref 13–17)
MCHC RBC-ENTMCNC: 29.6 PG — SIGNIFICANT CHANGE UP (ref 27–34)
MCHC RBC-ENTMCNC: 34.3 G/DL — SIGNIFICANT CHANGE UP (ref 32–36)
MCV RBC AUTO: 86.3 FL — SIGNIFICANT CHANGE UP (ref 80–100)
NRBC BLD AUTO-RTO: 0 /100 WBCS — SIGNIFICANT CHANGE UP (ref 0–0)
PLATELET # BLD AUTO: 311 K/UL — SIGNIFICANT CHANGE UP (ref 150–400)
POTASSIUM SERPL-MCNC: 3.6 MMOL/L — SIGNIFICANT CHANGE UP (ref 3.5–5.3)
POTASSIUM SERPL-SCNC: 3.6 MMOL/L — SIGNIFICANT CHANGE UP (ref 3.5–5.3)
RBC # BLD: 4.53 M/UL — SIGNIFICANT CHANGE UP (ref 4.2–5.8)
RBC # FLD: 14.5 % — SIGNIFICANT CHANGE UP (ref 10.3–14.5)
SODIUM SERPL-SCNC: 138 MMOL/L — SIGNIFICANT CHANGE UP (ref 135–145)
WBC # BLD: 9.33 K/UL — SIGNIFICANT CHANGE UP (ref 3.8–10.5)
WBC # FLD AUTO: 9.33 K/UL — SIGNIFICANT CHANGE UP (ref 3.8–10.5)

## 2025-03-15 PROCEDURE — 99239 HOSP IP/OBS DSCHRG MGMT >30: CPT

## 2025-03-15 RX ADMIN — METOPROLOL SUCCINATE 100 MILLIGRAM(S): 50 TABLET, EXTENDED RELEASE ORAL at 05:36

## 2025-03-15 RX ADMIN — BUPROPION HYDROBROMIDE 150 MILLIGRAM(S): 522 TABLET, EXTENDED RELEASE ORAL at 12:03

## 2025-03-15 RX ADMIN — CEFEPIME 100 MILLIGRAM(S): 2 INJECTION, POWDER, FOR SOLUTION INTRAVENOUS at 15:34

## 2025-03-15 RX ADMIN — BICTEGRAVIR SODIUM, EMTRICITABINE, AND TENOFOVIR ALAFENAMIDE FUMARATE 1 TABLET(S): 50; 200; 25 TABLET ORAL at 12:03

## 2025-03-15 RX ADMIN — CEFEPIME 100 MILLIGRAM(S): 2 INJECTION, POWDER, FOR SOLUTION INTRAVENOUS at 05:36

## 2025-03-15 RX ADMIN — DILTIAZEM HYDROCHLORIDE 240 MILLIGRAM(S): 240 TABLET, EXTENDED RELEASE ORAL at 05:36

## 2025-03-20 PROBLEM — E78.5 HYPERLIPIDEMIA, UNSPECIFIED: Chronic | Status: ACTIVE | Noted: 2025-03-11

## 2025-03-20 PROBLEM — Z85.46 PERSONAL HISTORY OF MALIGNANT NEOPLASM OF PROSTATE: Chronic | Status: ACTIVE | Noted: 2025-03-11

## 2025-03-21 DIAGNOSIS — Z85.048 PERSONAL HISTORY OF OTHER MALIGNANT NEOPLASM OF RECTUM, RECTOSIGMOID JUNCTION, AND ANUS: ICD-10-CM

## 2025-03-21 DIAGNOSIS — B20 HUMAN IMMUNODEFICIENCY VIRUS [HIV] DISEASE: ICD-10-CM

## 2025-03-21 DIAGNOSIS — N13.5 CROSSING VESSEL AND STRICTURE OF URETER WITHOUT HYDRONEPHROSIS: ICD-10-CM

## 2025-03-21 DIAGNOSIS — B96.5 PSEUDOMONAS (AERUGINOSA) (MALLEI) (PSEUDOMALLEI) AS THE CAUSE OF DISEASES CLASSIFIED ELSEWHERE: ICD-10-CM

## 2025-03-21 DIAGNOSIS — Z85.46 PERSONAL HISTORY OF MALIGNANT NEOPLASM OF PROSTATE: ICD-10-CM

## 2025-03-21 DIAGNOSIS — E78.5 HYPERLIPIDEMIA, UNSPECIFIED: ICD-10-CM
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
## 2025-03-21 DIAGNOSIS — T83.092A OTHER MECHANICAL COMPLICATION OF NEPHROSTOMY CATHETER, INITIAL ENCOUNTER: ICD-10-CM

## 2025-03-21 DIAGNOSIS — I10 ESSENTIAL (PRIMARY) HYPERTENSION: ICD-10-CM

## 2025-03-21 DIAGNOSIS — N13.1 HYDRONEPHROSIS WITH URETERAL STRICTURE, NOT ELSEWHERE CLASSIFIED: ICD-10-CM

## 2025-03-21 DIAGNOSIS — N39.0 URINARY TRACT INFECTION, SITE NOT SPECIFIED: ICD-10-CM

## 2025-03-21 DIAGNOSIS — R33.9 RETENTION OF URINE, UNSPECIFIED: ICD-10-CM

## 2025-03-21 DIAGNOSIS — Z92.3 PERSONAL HISTORY OF IRRADIATION: ICD-10-CM
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
## 2025-03-25 ENCOUNTER — APPOINTMENT (OUTPATIENT)
Dept: INFECTIOUS DISEASE | Facility: CLINIC | Age: 60
End: 2025-03-25

## 2025-03-25 ENCOUNTER — APPOINTMENT (OUTPATIENT)
Dept: INFECTIOUS DISEASE | Facility: CLINIC | Age: 60
End: 2025-03-25
Payer: COMMERCIAL

## 2025-03-25 PROCEDURE — 97803 MED NUTRITION INDIV SUBSEQ: CPT | Mod: 95

## 2025-03-27 ENCOUNTER — APPOINTMENT (OUTPATIENT)
Dept: COLORECTAL SURGERY | Facility: CLINIC | Age: 60
End: 2025-03-27
Payer: MEDICAID

## 2025-03-27 DIAGNOSIS — R10.9 UNSPECIFIED ABDOMINAL PAIN: ICD-10-CM

## 2025-03-27 DIAGNOSIS — C21.0 MALIGNANT NEOPLASM OF ANUS, UNSPECIFIED: ICD-10-CM

## 2025-03-27 PROCEDURE — 99213 OFFICE O/P EST LOW 20 MIN: CPT

## 2025-04-03 ENCOUNTER — APPOINTMENT (OUTPATIENT)
Dept: TRAUMA SURGERY | Facility: CLINIC | Age: 60
End: 2025-04-03
Payer: MEDICAID

## 2025-04-03 VITALS
HEIGHT: 71 IN | HEART RATE: 60 BPM | BODY MASS INDEX: 27.16 KG/M2 | SYSTOLIC BLOOD PRESSURE: 116 MMHG | DIASTOLIC BLOOD PRESSURE: 84 MMHG | OXYGEN SATURATION: 99 % | WEIGHT: 194 LBS

## 2025-04-03 PROCEDURE — 99204 OFFICE O/P NEW MOD 45 MIN: CPT

## 2025-04-03 RX ORDER — MORPHINE SULFATE 15 MG/1
15 TABLET, FILM COATED, EXTENDED RELEASE ORAL 3 TIMES DAILY
Qty: 21 | Refills: 0 | Status: ACTIVE | COMMUNITY
Start: 2025-04-03 | End: 1900-01-01

## 2025-04-07 ENCOUNTER — EMERGENCY (EMERGENCY)
Facility: HOSPITAL | Age: 60
LOS: 1 days | End: 2025-04-07
Attending: EMERGENCY MEDICINE | Admitting: EMERGENCY MEDICINE
Payer: MEDICAID

## 2025-04-07 ENCOUNTER — OUTPATIENT (OUTPATIENT)
Dept: OUTPATIENT SERVICES | Facility: HOSPITAL | Age: 60
LOS: 1 days | End: 2025-04-07

## 2025-04-07 VITALS
TEMPERATURE: 97 F | HEART RATE: 81 BPM | DIASTOLIC BLOOD PRESSURE: 74 MMHG | HEIGHT: 71 IN | OXYGEN SATURATION: 98 % | WEIGHT: 184.09 LBS | SYSTOLIC BLOOD PRESSURE: 98 MMHG | RESPIRATION RATE: 17 BRPM

## 2025-04-07 VITALS
RESPIRATION RATE: 16 BRPM | DIASTOLIC BLOOD PRESSURE: 75 MMHG | HEART RATE: 83 BPM | OXYGEN SATURATION: 96 % | SYSTOLIC BLOOD PRESSURE: 121 MMHG | TEMPERATURE: 98 F

## 2025-04-07 VITALS
HEIGHT: 71 IN | SYSTOLIC BLOOD PRESSURE: 110 MMHG | WEIGHT: 184.09 LBS | RESPIRATION RATE: 16 BRPM | HEART RATE: 78 BPM | DIASTOLIC BLOOD PRESSURE: 78 MMHG | OXYGEN SATURATION: 97 % | TEMPERATURE: 98 F

## 2025-04-07 DIAGNOSIS — T83.098A OTHER MECHANICAL COMPLICATION OF OTHER URINARY CATHETER, INITIAL ENCOUNTER: ICD-10-CM

## 2025-04-07 DIAGNOSIS — K43.3 PARASTOMAL HERNIA WITH OBSTRUCTION, WITHOUT GANGRENE: ICD-10-CM

## 2025-04-07 DIAGNOSIS — Z91.89 OTHER SPECIFIED PERSONAL RISK FACTORS, NOT ELSEWHERE CLASSIFIED: ICD-10-CM

## 2025-04-07 DIAGNOSIS — Z93.3 COLOSTOMY STATUS: Chronic | ICD-10-CM

## 2025-04-07 DIAGNOSIS — Z93.6 OTHER ARTIFICIAL OPENINGS OF URINARY TRACT STATUS: Chronic | ICD-10-CM

## 2025-04-07 DIAGNOSIS — B20 HUMAN IMMUNODEFICIENCY VIRUS [HIV] DISEASE: ICD-10-CM

## 2025-04-07 DIAGNOSIS — I10 ESSENTIAL (PRIMARY) HYPERTENSION: ICD-10-CM

## 2025-04-07 LAB
ADD ON TEST-SPECIMEN IN LAB: SIGNIFICANT CHANGE UP
ALBUMIN SERPL ELPH-MCNC: 4.2 G/DL — SIGNIFICANT CHANGE UP (ref 3.3–5)
ALP SERPL-CCNC: 104 U/L — SIGNIFICANT CHANGE UP (ref 40–120)
ALT FLD-CCNC: 11 U/L — SIGNIFICANT CHANGE UP (ref 4–41)
ANION GAP SERPL CALC-SCNC: 10 MMOL/L — SIGNIFICANT CHANGE UP (ref 7–14)
APPEARANCE UR: ABNORMAL
APTT BLD: 33.8 SEC — SIGNIFICANT CHANGE UP (ref 24.5–35.6)
AST SERPL-CCNC: 12 U/L — SIGNIFICANT CHANGE UP (ref 4–40)
BACTERIA # UR AUTO: ABNORMAL /HPF
BASOPHILS # BLD AUTO: 0.04 K/UL — SIGNIFICANT CHANGE UP (ref 0–0.2)
BASOPHILS # BLD AUTO: 0.05 K/UL — SIGNIFICANT CHANGE UP (ref 0–0.2)
BASOPHILS NFR BLD AUTO: 0.6 % — SIGNIFICANT CHANGE UP (ref 0–2)
BASOPHILS NFR BLD AUTO: 0.7 % — SIGNIFICANT CHANGE UP (ref 0–2)
BILIRUB SERPL-MCNC: 0.2 MG/DL — SIGNIFICANT CHANGE UP (ref 0.2–1.2)
BILIRUB UR-MCNC: NEGATIVE — SIGNIFICANT CHANGE UP
BLD GP AB SCN SERPL QL: NEGATIVE — SIGNIFICANT CHANGE UP
BLD GP AB SCN SERPL QL: NEGATIVE — SIGNIFICANT CHANGE UP
BUN SERPL-MCNC: 18 MG/DL — SIGNIFICANT CHANGE UP (ref 7–23)
CALCIUM SERPL-MCNC: 9.7 MG/DL — SIGNIFICANT CHANGE UP (ref 8.4–10.5)
CAST: 2 /LPF — SIGNIFICANT CHANGE UP (ref 0–4)
CHLORIDE SERPL-SCNC: 102 MMOL/L — SIGNIFICANT CHANGE UP (ref 98–107)
CO2 SERPL-SCNC: 28 MMOL/L — SIGNIFICANT CHANGE UP (ref 22–31)
COLOR SPEC: SIGNIFICANT CHANGE UP
CREAT SERPL-MCNC: 0.96 MG/DL — SIGNIFICANT CHANGE UP (ref 0.5–1.3)
DIFF PNL FLD: ABNORMAL
EGFR: 92 ML/MIN/1.73M2 — SIGNIFICANT CHANGE UP
EGFR: 92 ML/MIN/1.73M2 — SIGNIFICANT CHANGE UP
EOSINOPHIL # BLD AUTO: 0.16 K/UL — SIGNIFICANT CHANGE UP (ref 0–0.5)
EOSINOPHIL # BLD AUTO: 0.16 K/UL — SIGNIFICANT CHANGE UP (ref 0–0.5)
EOSINOPHIL NFR BLD AUTO: 2.2 % — SIGNIFICANT CHANGE UP (ref 0–6)
EOSINOPHIL NFR BLD AUTO: 2.3 % — SIGNIFICANT CHANGE UP (ref 0–6)
GLUCOSE SERPL-MCNC: 96 MG/DL — SIGNIFICANT CHANGE UP (ref 70–99)
GLUCOSE UR QL: NEGATIVE MG/DL — SIGNIFICANT CHANGE UP
HCT VFR BLD CALC: 40.3 % — SIGNIFICANT CHANGE UP (ref 39–50)
HCT VFR BLD CALC: 41 % — SIGNIFICANT CHANGE UP (ref 39–50)
HGB BLD-MCNC: 13.5 G/DL — SIGNIFICANT CHANGE UP (ref 13–17)
HGB BLD-MCNC: 13.6 G/DL — SIGNIFICANT CHANGE UP (ref 13–17)
IANC: 3.91 K/UL — SIGNIFICANT CHANGE UP (ref 1.8–7.4)
IMM GRANULOCYTES NFR BLD AUTO: 0.4 % — SIGNIFICANT CHANGE UP (ref 0–0.9)
IMM GRANULOCYTES NFR BLD AUTO: 0.6 % — SIGNIFICANT CHANGE UP (ref 0–0.9)
INR BLD: 1.11 RATIO — SIGNIFICANT CHANGE UP (ref 0.85–1.16)
KETONES UR-MCNC: NEGATIVE MG/DL — SIGNIFICANT CHANGE UP
LEUKOCYTE ESTERASE UR-ACNC: ABNORMAL
LYMPHOCYTES # BLD AUTO: 2.2 K/UL — SIGNIFICANT CHANGE UP (ref 1–3.3)
LYMPHOCYTES # BLD AUTO: 2.53 K/UL — SIGNIFICANT CHANGE UP (ref 1–3.3)
LYMPHOCYTES # BLD AUTO: 31.7 % — SIGNIFICANT CHANGE UP (ref 13–44)
LYMPHOCYTES # BLD AUTO: 34.8 % — SIGNIFICANT CHANGE UP (ref 13–44)
MCHC RBC-ENTMCNC: 29.9 PG — SIGNIFICANT CHANGE UP (ref 27–34)
MCHC RBC-ENTMCNC: 30.1 PG — SIGNIFICANT CHANGE UP (ref 27–34)
MCHC RBC-ENTMCNC: 33.2 G/DL — SIGNIFICANT CHANGE UP (ref 32–36)
MCHC RBC-ENTMCNC: 33.5 G/DL — SIGNIFICANT CHANGE UP (ref 32–36)
MCV RBC AUTO: 90 FL — SIGNIFICANT CHANGE UP (ref 80–100)
MCV RBC AUTO: 90.1 FL — SIGNIFICANT CHANGE UP (ref 80–100)
MONOCYTES # BLD AUTO: 0.59 K/UL — SIGNIFICANT CHANGE UP (ref 0–0.9)
MONOCYTES # BLD AUTO: 0.85 K/UL — SIGNIFICANT CHANGE UP (ref 0–0.9)
MONOCYTES NFR BLD AUTO: 11.7 % — SIGNIFICANT CHANGE UP (ref 2–14)
MONOCYTES NFR BLD AUTO: 8.5 % — SIGNIFICANT CHANGE UP (ref 2–14)
NEUTROPHILS # BLD AUTO: 3.65 K/UL — SIGNIFICANT CHANGE UP (ref 1.8–7.4)
NEUTROPHILS # BLD AUTO: 3.91 K/UL — SIGNIFICANT CHANGE UP (ref 1.8–7.4)
NEUTROPHILS NFR BLD AUTO: 50.2 % — SIGNIFICANT CHANGE UP (ref 43–77)
NEUTROPHILS NFR BLD AUTO: 56.3 % — SIGNIFICANT CHANGE UP (ref 43–77)
NITRITE UR-MCNC: POSITIVE
NRBC # BLD AUTO: 0 K/UL — SIGNIFICANT CHANGE UP (ref 0–0)
NRBC # FLD: 0 K/UL — SIGNIFICANT CHANGE UP (ref 0–0)
NRBC BLD AUTO-RTO: 0 /100 WBCS — SIGNIFICANT CHANGE UP (ref 0–0)
PH UR: 7 — SIGNIFICANT CHANGE UP (ref 5–8)
PLATELET # BLD AUTO: 330 K/UL — SIGNIFICANT CHANGE UP (ref 150–400)
PLATELET # BLD AUTO: 338 K/UL — SIGNIFICANT CHANGE UP (ref 150–400)
POTASSIUM SERPL-MCNC: 4.2 MMOL/L — SIGNIFICANT CHANGE UP (ref 3.5–5.3)
POTASSIUM SERPL-SCNC: 4.2 MMOL/L — SIGNIFICANT CHANGE UP (ref 3.5–5.3)
PROT SERPL-MCNC: 7.7 G/DL — SIGNIFICANT CHANGE UP (ref 6–8.3)
PROT UR-MCNC: 300 MG/DL
PROTHROM AB SERPL-ACNC: 12.9 SEC — SIGNIFICANT CHANGE UP (ref 9.9–13.4)
RBC # BLD: 4.48 M/UL — SIGNIFICANT CHANGE UP (ref 4.2–5.8)
RBC # BLD: 4.55 M/UL — SIGNIFICANT CHANGE UP (ref 4.2–5.8)
RBC # FLD: 14.6 % — HIGH (ref 10.3–14.5)
RBC # FLD: 14.6 % — HIGH (ref 10.3–14.5)
RBC CASTS # UR COMP ASSIST: 74 /HPF — HIGH (ref 0–4)
REVIEW: SIGNIFICANT CHANGE UP
RH IG SCN BLD-IMP: NEGATIVE — SIGNIFICANT CHANGE UP
RH IG SCN BLD-IMP: NEGATIVE — SIGNIFICANT CHANGE UP
SODIUM SERPL-SCNC: 140 MMOL/L — SIGNIFICANT CHANGE UP (ref 135–145)
SP GR SPEC: 1.02 — SIGNIFICANT CHANGE UP (ref 1–1.03)
SQUAMOUS # UR AUTO: 0 /HPF — SIGNIFICANT CHANGE UP (ref 0–5)
UROBILINOGEN FLD QL: 1 MG/DL — SIGNIFICANT CHANGE UP (ref 0.2–1)
WBC # BLD: 6.94 K/UL — SIGNIFICANT CHANGE UP (ref 3.8–10.5)
WBC # BLD: 7.27 K/UL — SIGNIFICANT CHANGE UP (ref 3.8–10.5)
WBC # FLD AUTO: 6.94 K/UL — SIGNIFICANT CHANGE UP (ref 3.8–10.5)
WBC # FLD AUTO: 7.27 K/UL — SIGNIFICANT CHANGE UP (ref 3.8–10.5)
WBC UR QL: 2429 /HPF — HIGH (ref 0–5)

## 2025-04-07 PROCEDURE — 99223 1ST HOSP IP/OBS HIGH 75: CPT

## 2025-04-07 PROCEDURE — 50387 CHANGE NEPHROURETERAL CATH: CPT | Mod: RT

## 2025-04-07 RX ORDER — TRAZODONE HCL 100 MG
50 TABLET ORAL DAILY
Refills: 0 | Status: ACTIVE | OUTPATIENT
Start: 2025-04-07 | End: 2026-03-06

## 2025-04-07 RX ORDER — CEFTRIAXONE 500 MG/1
1000 INJECTION, POWDER, FOR SOLUTION INTRAMUSCULAR; INTRAVENOUS ONCE
Refills: 0 | Status: COMPLETED | OUTPATIENT
Start: 2025-04-07 | End: 2025-04-07

## 2025-04-07 RX ORDER — TRAZODONE HCL 100 MG
1 TABLET ORAL
Refills: 0 | DISCHARGE

## 2025-04-07 RX ORDER — BUPROPION HYDROBROMIDE 522 MG/1
1 TABLET, EXTENDED RELEASE ORAL
Refills: 0 | DISCHARGE

## 2025-04-07 RX ORDER — CEFTRIAXONE 500 MG/1
1000 INJECTION, POWDER, FOR SOLUTION INTRAMUSCULAR; INTRAVENOUS EVERY 24 HOURS
Refills: 0 | Status: DISCONTINUED | OUTPATIENT
Start: 2025-04-07 | End: 2025-04-07

## 2025-04-07 RX ORDER — BICTEGRAVIR SODIUM, EMTRICITABINE, AND TENOFOVIR ALAFENAMIDE FUMARATE 30; 120; 15 MG/1; MG/1; MG/1
1 TABLET ORAL
Refills: 0 | DISCHARGE

## 2025-04-07 RX ORDER — CEFDINIR 250 MG/5ML
1 POWDER, FOR SUSPENSION ORAL
Qty: 14 | Refills: 0
Start: 2025-04-07 | End: 2025-04-13

## 2025-04-07 RX ORDER — METOPROLOL SUCCINATE 50 MG/1
50 TABLET, EXTENDED RELEASE ORAL DAILY
Refills: 0 | Status: ACTIVE | OUTPATIENT
Start: 2025-04-07 | End: 2026-03-06

## 2025-04-07 RX ORDER — EZETIMIBE 10 MG/1
1 TABLET ORAL
Refills: 0 | DISCHARGE

## 2025-04-07 RX ORDER — BUPROPION HYDROBROMIDE 522 MG/1
150 TABLET, EXTENDED RELEASE ORAL DAILY
Refills: 0 | Status: ACTIVE | OUTPATIENT
Start: 2025-04-07 | End: 2026-03-06

## 2025-04-07 RX ORDER — OXYCODONE HYDROCHLORIDE 30 MG/1
1 TABLET ORAL
Qty: 5 | Refills: 0
Start: 2025-04-07

## 2025-04-07 RX ORDER — BICTEGRAVIR SODIUM, EMTRICITABINE, AND TENOFOVIR ALAFENAMIDE FUMARATE 30; 120; 15 MG/1; MG/1; MG/1
1 TABLET ORAL DAILY
Refills: 0 | Status: ACTIVE | OUTPATIENT
Start: 2025-04-07 | End: 2026-03-06

## 2025-04-07 RX ORDER — CLONAZEPAM 0.5 MG/1
1 TABLET ORAL
Refills: 0 | DISCHARGE

## 2025-04-07 RX ORDER — DILTIAZEM HYDROCHLORIDE 240 MG/1
1 TABLET, EXTENDED RELEASE ORAL
Refills: 0 | DISCHARGE

## 2025-04-07 RX ORDER — EZETIMIBE 10 MG/1
10 TABLET ORAL DAILY
Refills: 0 | Status: ACTIVE | OUTPATIENT
Start: 2025-04-07 | End: 2026-03-06

## 2025-04-07 RX ADMIN — Medication 4 MILLIGRAM(S): at 13:15

## 2025-04-07 RX ADMIN — Medication 4 MILLIGRAM(S): at 19:04

## 2025-04-07 RX ADMIN — CEFTRIAXONE 100 MILLIGRAM(S): 500 INJECTION, POWDER, FOR SOLUTION INTRAMUSCULAR; INTRAVENOUS at 15:33

## 2025-04-07 RX ADMIN — Medication 4 MILLIGRAM(S): at 17:56

## 2025-04-07 NOTE — ED CDU PROVIDER DISPOSITION NOTE - ATTENDING CONTRIBUTION TO CARE
Dr. Ojeda:  I performed a face to face bedside interview with patient regarding history of present illness, review of symptoms and past medical history. I completed an independent physical exam.  I have discussed patient's plan of care with PA.   I agree with note as stated above, having amended the EMR as needed to reflect my findings.   This includes HISTORY OF PRESENT ILLNESS, HIV, PAST MEDICAL/SURGICAL/FAMILY/SOCIAL HISTORY, ALLERGIES AND HOME MEDICATIONS, REVIEW OF SYSTEMS, PHYSICAL EXAM, and any PROGRESS NOTES during the time I functioned as the attending physician for this patient.

## 2025-04-07 NOTE — H&P PST ADULT - NEGATIVE GENERAL GENITOURINARY SYMPTOMS
no renal colic/no flank pain L/no flank pain R/no bladder infections/no incontinence/no dysuria/no urinary hesitancy/normal urinary frequency no hematuria

## 2025-04-07 NOTE — ED ADULT NURSE NOTE - OBJECTIVE STATEMENT
Pt arrives to room 17. Pt is A and Ox4 and ambulatory. Hx: bilateral nephrostomy tubes. LLQ colostomy, prostate and anal ca . Pt arrives to the ED complaining of clogged nephrostomy tubes. Pt endorses he noticed that there was decrease in drainage bilaterally. Pt endorsing abdominal pain that started this am. Airway is patent, respirations are even and unlabored. Pt denies chest pain, shortness of breath, headaches, dizziness, numbness and tingling, fever and chills, nausea/vomitting/diarrhea. Skin is clean, dry, intact, and appropriate for race.  20 G IV in RAC. Labs sent per provider orders. Pt medicated per MAR. Plan of care ongoing, safety maintained.

## 2025-04-07 NOTE — ED PROVIDER NOTE - ATTENDING CONTRIBUTION TO CARE
Agree with resident note  59-year-old male with history of hypertension, HIV, history of prostate anal cancer, history of bilateral percutaneous nephrostomy tubes presents for tube malfunction.  States right was not draining and left was minimally draining.  Was at preop today for hernia repair.  Endorsed decreased drainage to no drainage nephrostomy tubes and was instructed to come to the emergency room surgery was scheduled for this Thursday with Dr. Bailey.  Patient denies vomiting or fevers.  Physical exam  Well-appearing male in no respiratory distress  Vital signs stable  Clear to auscultation bilaterally  S1-S2 no murmurs rubs or gallops  Abdomen colostomy bag in place minimal output   bilateral nephrostomy tubes right back no urine left bag minimal cloudy urine  Impression  Patient will need tube exchange of bilateral nephrostomy tubes will contact IR  Will likely have to be placed in CDU versus admitted will sign out to incoming attending  Have instructed resident to contact surgery given scheduled for surgery on Thursday and possibly can have surgery as an inpatient.

## 2025-04-07 NOTE — ED CDU PROVIDER DISPOSITION NOTE - PATIENT PORTAL LINK FT
You can access the FollowMyHealth Patient Portal offered by [facility] by registering at the following website: [email or web address]. By joining [facility] [employer]’s FollowMyHealth portal, you will also be able to view your health information using other applications (apps) compatible with our system.

## 2025-04-07 NOTE — ED CDU PROVIDER INITIAL DAY NOTE - OBJECTIVE STATEMENT
58 y/o male with pmhx of HTN, HIV on Biktarvy, prostate and anal cancer s/p colostomy and radiation treatment, s/p bilateral nephrostomies, anxiety, depression, presents to ED c/o right nephrostomy tube malfunction. States it stopped draining for the past 4 days. Last exchanged in March at [facility] by IR. Pt also c/o burning with urination. Scheduled to have hernia repair in 3 days outpatient. Pt took morphine left over at home for pain from 2022. Denies fever, chills, nausea, vomiting, hematuria.

## 2025-04-07 NOTE — H&P PST ADULT - PROBLEM SELECTOR PLAN 3
Patient instructed to take clonazepam, bupropion, , with a sip of water on the morning of procedure. Patient instructed to take clonazepam, bupropion with a sip of water on the morning of procedure.

## 2025-04-07 NOTE — H&P PST ADULT - FUNCTIONAL STATUS
mets dasi score 4.07/4-10 METS mets dasi score 5.07/4-10 METS mets dasi score 5.07 due to current abdominal pain/4-10 METS

## 2025-04-07 NOTE — ED PROVIDER NOTE - PROGRESS NOTE DETAILS
Aurea Mendoza,  (PGY-2): UA + infection. based on prior cultures, sensitive for cephalosporins. Will give CTX and admit to medicine for IR eval. Aurea Mendoza DO (PGY-2): IR will take patient for nephrostomy exchange today. Will observe patient in CDU after to ensure functioning drains.

## 2025-04-07 NOTE — ED ADULT NURSE NOTE - NS ED NOTE ABUSE RESPONSE YN
Respiratory Care Protocol Assessment    Respiratory assessment completed, pathway(s) are indicated per the following triggers:        Volume Expansion: Yes  Breath Sounds: Diminished  CXR: Consolidation/pulmonary edema  Activity Level: Non-ambulatory                  Recommendations: Will order Incentive Spirometry and PAP therapy per  Acuity Score: 10   they will be scheduled   Acuity Frequency: 3x daily and prn.    Additional comments:      Goal: Return to current home regimen        Yes

## 2025-04-07 NOTE — H&P PST ADULT - RESPIRATORY
clear to auscultation bilaterally/no wheezes/no rales/no respiratory distress/airway patent/respirations non-labored

## 2025-04-07 NOTE — H&P PST ADULT - MUSCULOSKELETAL
ROM intact/no calf tenderness/normal gait/strength 5/5 bilateral upper extremities/strength 5/5 bilateral lower extremities/extremities exam negative details…

## 2025-04-07 NOTE — ED ADULT TRIAGE NOTE - CHIEF COMPLAINT QUOTE
pt with b/l nephrostomy tubes 2/2 Radiation, c/o clogged R side. pt with Prostate and Anal Ca. currently not on chemo. pt with Colostomy.

## 2025-04-07 NOTE — H&P PST ADULT - OTHER CARE PROVIDERS
-Cardiologist ,  [phone]  Pain management , Dr. Ravi Barnes [phone] 1500Urologist   -Cardiologist ,  [phone]  Pain management , Dr. Ravi Barnes  Urologist

## 2025-04-07 NOTE — CHART NOTE - NSCHARTNOTEFT_GEN_A_CORE
IR consulted to evaluate R nephroureteral tube due to no output in bag. Last exchange was performed on 3/12. Patient reports this started on Thursday with inability to flush the tube.     Patient seen and assessed at bedside. Resistance was felt while attempting to flush with normal saline likely due to occlusion. Patient will require exchange of tube. The left nephroureteral tube flushes easily and with good output.     Plan:  - IR will plan for right nephroureteral tube exchange today  - Please place IR procedure order under Dr. Olmedo  - write pre-IR note  - No need for NPO, procedure will be performed under local anesthesia    [id number] TANIA GARCIA
PRE-INTERVENTIONAL RADIOLOGY PROCEDURE NOTE      Patient Age: 59    Patient Gender: Male     Procedure: Right Nephroureteral tube exchange     Diagnosis/Indication: Non-draining R nephrostomy tube     Interventional Radiology Attending Physician: Dr. Olmedo    Ordering Attending Physician: Dr. Ojeda     Pertinent Medical History:  HTN  HIV  prostate and anal CA (s/p XRT c/b perforated anus/rectum, s/p colostomy)  hx of b/l metal stent [id number]  s/p stent removal and b/l percutaneous nephrostomies 2/19/25  anxiety    Pertinent labs:                      13.6   6.94  )-----------( 338      ( 07 Apr 2025 11:45 )             41.0       04-07    140  |  102  |  18  ----------------------------<  96  4.2   |  28  |  0.96    Ca    9.7      07 Apr 2025 11:45    TPro  7.7  /  Alb  4.2  /  TBili  0.2  /  DBili  x   /  AST  12  /  ALT  11  /  AlkPhos  104  04-07      PT/INR - ( 07 Apr 2025 11:45 )   PT: 12.9 sec;   INR: 1.11 ratio         PTT - ( 07 Apr 2025 11:45 )  PTT:33.8 sec        Patient and Family Aware ? Yes

## 2025-04-07 NOTE — PROCEDURE NOTE - PROCEDURE FINDINGS AND DETAILS
Successful fluoroscopic guided exchange for a new 10Fr x 24cm nephroureteral tube.  Attached to gravity drainage.

## 2025-04-07 NOTE — ED ADULT NURSE REASSESSMENT NOTE - NS ED NURSE REASSESS COMMENT FT1
rt side 10.2 fr drain inserted in IR. pt back from IR and is being transferred to CDU rm 3. report given to HA Aguirre.

## 2025-04-07 NOTE — H&P PST ADULT - LAST ECHOCARDIOGRAM
2+years - reports nad 01/07/2025 Left ventricular cavity is normal in size. Left ventricular wall thickness is normal. Left ventricular systolic function is normal with an ejection fraction of 66 %,  No significant valvular disease.

## 2025-04-07 NOTE — ED PROVIDER NOTE - CLINICAL SUMMARY MEDICAL DECISION MAKING FREE TEXT BOX
59M hx of HTN, HIV, hx of prostate and anal CA (s/p XRT c/b perforated anus/rectum, s/p colostomy), hx of b/l metal stent placement (on 12/12/23, s/p stent removal and b/l percutaneous nephrostomies on 2/19/25), and anxiety presents today for R nephrostomy tube malfunction - not draining since Thursday, with dysuria from penis. Last exchanged last month by IR. Afebrile, vitals stable. Exam notable for bilateral nephrostomy tubes, no urine in R bag. Colostomy noted to abdomen with surrounding tenderness. Plan for hernia repair on Thursday. Concern for UTI/pyelo, will send urine, pre-op labs in anticipation for IR procedure, and likely admit.

## 2025-04-07 NOTE — ED PROVIDER NOTE - PHYSICAL EXAMINATION
GEN: NAD, awake, eyes open spontaneously  EYES: normal conjunctiva  ENT: NCAT, MMM, Trachea midline  CHEST/LUNGS: Non-tachypneic, CTAB, bilateral breath sounds  CARDIAC: Non-tachycardic, normal perfusion  ABDOMEN: Soft. Colostomy bag in place. No stool in bag. Tenderness to palpation around colostomy site. No rebound/guarding  : Bilateral nephrostomy tubes. Left tube with cloudy yellow urine. Right bag with no urine.   MSK: No edema, no gross deformity of extremities  NEURO: CN grossly intact, no focal motor or sensory deficits  PSYCH: Alert, appropriate, cooperative, with capacity and insight

## 2025-04-07 NOTE — H&P PST ADULT - CARDIOVASCULAR
details… regular rate and rhythm/S1 S2 present/no JVD/no pedal edema regular rate and rhythm/S1 S2 present/no JVD/no pedal edema/vascular

## 2025-04-07 NOTE — H&P PST ADULT - PROBLEM SELECTOR PLAN 6
Pt c/o right nephrostomy tube malfunction since today morning. Pt 's wife said , she tried to flush the tube but was unable . Pt reports, he typically urinates through urethra only when nephrostomy is clogged . Currently he is c/o burning and pain sensation while urinating .  Urine c/s sent -results awaiting   Pt went to ER after PST appointment  for right side nephrostomy tube malfunction.  Surgeon made aware via TEAMS. Pt c/o right nephrostomy tube malfunction since today morning. Pt 's wife said , she tried to flush the tube but was unable . Pt reports, he typically urinates through urethra only when nephrostomy is clogged -currently  c/o burning and pain sensation while urinating .  Urine c/s sent -results awaiting   Pt went to ER after PST appointment  for right side nephrostomy tube malfunction.  Surgeon made aware via TEAMS. Pt c/o right nephrostomy tube malfunction since today morning. Pt 's wife said , she tried to flush the tube but was unable . Pt reports, he typically urinates through urethra only when nephrostomy is clogged -currently  c/o burning and pain sensation while urinating .  Urine c/s sent -results awaiting   Pt went to ER after PST appointment  for right side nephrostomy tube malfunction.  Surgeon made aware via email. Pt has bilateral nephrostomy tubes  2/2 radiation (prostate - anal CA (s/p XRT),   Pt c/o right nephrostomy tube malfunction since today morning. Pt 's wife said , she tried to flush the tube but was unable . Pt reports, he typically urinates through urethra only when nephrostomy is clogged -currently  c/o burning and pain sensation while urinating .  Urine c/s sent -results awaiting   Pt went to ER after PST appointment  for right side nephrostomy tube malfunction.  Surgeon made aware via email. Pt has bilateral nephrostomy tubes  2/2 radiation (prostate - anal CA (s/p XRT),   Pt c/o right nephrostomy tube malfunction since today morning. Pt 's wife said , she tried to flush the tube but was unable . Pt reports, he typically urinates through urethra only when nephrostomy is clogged -currently  c/o burning and pain sensation while urinating .  Urine c/s sent -results awaiting   Pt sent to ER after PST appointment  for right side nephrostomy tube malfunction and dysuria.  Surgeon made aware via email.

## 2025-04-07 NOTE — ED ADULT NURSE NOTE - NSFALLUNIVINTERV_ED_ALL_ED
Bed/Stretcher in lowest position, wheels locked, appropriate side rails in place/Call bell, personal items and telephone in reach/Instruct patient to call for assistance before getting out of bed/chair/stretcher/Non-slip footwear applied when patient is off stretcher/[place] to call system/Physically safe environment - no spills, clutter or unnecessary equipment/Purposeful proactive rounding/Room/bathroom lighting operational, light cord in reach

## 2025-04-07 NOTE — ED CDU PROVIDER INITIAL DAY NOTE - CHIEF COMPLAINT
NUTRITION SERVICES: BMI - Pt with BMI >40 (=Body mass index is 45.69 kg/m².), Class III obesity. Weight loss counseling not appropriate in acute care setting. RECOMMEND - If appropriate at DC please refer to outpatient nutrition services for weight management.        The patient is a 59y Male complaining of

## 2025-04-07 NOTE — ED CDU PROVIDER DISPOSITION NOTE - CLINICAL COURSE
60 y/o male with pmhx of HTN, HIV on Biktarvy, prostate and anal cancer s/p colostomy and radiation treatment, s/p bilateral nephrostomies, anxiety, depression, presents to ED c/o right nephrostomy tube malfunction. States it stopped draining for the past 4 days. Last exchanged in March at [facility] by IR. Pt also c/o burning with urination. Scheduled to have hernia repair in 3 days outpatient. Pt took morphine left over at home for pain from 2022. Denies fever, chills, nausea, vomiting, hematuria  Pt had nephrostomy tube exchange by IR, monitored in CDU with appropriate urine output, pt reassessed and would like to go home, will d/c with pain control, abx, strict return precautions

## 2025-04-07 NOTE — H&P PST ADULT - NSICDXPASTMEDICALHX_GEN_ALL_CORE_FT
PAST MEDICAL HISTORY:  Abdominal pain     Anal cancer     Anxiety     BPH (benign prostatic hyperplasia)     Depressive disorder     Diverticulosis     HIV infection     HLD (hyperlipidemia)     HTN (hypertension)     Insomnia     Lung cancer     Prostate cancer     Unspecified abdominal pain      PAST MEDICAL HISTORY:  Anxiety     History of anal cancer     History of prostate cancer     HIV infection     HLD (hyperlipidemia)     HTN (hypertension)     Parastomal hernia with obstruction and without gangrene

## 2025-04-07 NOTE — ED CDU PROVIDER INITIAL DAY NOTE - CLINICAL SUMMARY MEDICAL DECISION MAKING FREE TEXT BOX
60 y/o male with pmhx of HTN, HIV on Biktarvy, prostate and anal cancer s/p colostomy and radiation treatment, s/p bilateral nephrostomies, anxiety, depression, presents to ED c/o right nephrostomy tube malfunction. States it stopped draining for the past 4 days. Last exchanged in March at [facility] by IR. Pt also c/o burning with urination. Scheduled to have hernia repair in 3 days outpatient. Sent to CDU for IR procedure and monitoring, pain control.

## 2025-04-07 NOTE — H&P PST ADULT - HISTORY OF PRESENT ILLNESS
59 year old male with hx of  PMHx of HTN, HIV, hx of prostate and anal CA (s/p XRT c/b perforated anus/rectum, s/p colostomy), hx of b/l metal stent placement (on 12/12/23, s/p stent removal and b/l percutaneous nephrostomies on 2/19/25), and anxiety who presented to the ED on 3/11/25 for complaints of clogged left nephrostomy tube     58yo M with h/o APR with R VRAM and pelvic radiation ('21) for anal cancer, presents with a few months of abdominal pain, started after an episode of constipation. Now taking morphine ER 15mg TID. CT with parastomal hernia containing loop of unobstructed bowel 59 year old male with hx of  PMHx of HTN, HIV, Anxiety, hx of prostate and anal CA (s/p XRT c/b perforated anus/rectum, s/p colostomy), hx of b/l metal stent placement (on 12/12/23, s/p stent removal and b/l percutaneous nephrostomies on 2/19/25)  presented to the ED on 3/11/25 for complaints of clogged left nephrostomy tube malfunction , IR consulted , s/p Bilateral nephrostogram shows long segment narrowing in the distal 1/3rd of the ureter.KUB checked , tube in position. Also noted to be in acute UTI -completed IV cefepime, pt was d/c'd home on 03/15/2025 on Vantin X 4 days. Currently pt c/o few months of abdominal pain, started after an episode of constipation. Now taking morphine ER 15mg TID. CT with parastomal hernia containing loop of unobstructed bowel presents to PST with pre op dx of parastomal hernia with obstruction and without gangrene is scheduled for robotic assisted parastomal hernia repair with mesh/ possible open .      58yo M with h/o APR with R VRAM and pelvic radiation ('21) for anal cancer, presents with a few months o 59 year old male with hx of  PMHx of HTN, HIV, Anxiety, prostate - anal CA (s/p XRT c/b perforated anus/rectum, s/p colostomy), hx of b/l metal stent placement (on 12/12/23, s/p stent removal and b/l percutaneous nephrostomies on 2/19/25)  presented to the ED on 3/11/25 for complaints of clogged left nephrostomy tube malfunction , IR consulted , s/p Bilateral nephrostogram shows long segment narrowing in the distal 1/3rd of the ureter.KUB checked , tube in position. Also noted to be in acute UTI -completed IV cefepime, pt was d/c'd home on 03/15/2025 on Vantin X 4 days. Currently pt c/o few months of abdominal pain, started after an episode of constipation. Now taking morphine ER 15mg TID. CT with parastomal hernia containing loop of unobstructed bowel presents to PST with pre op dx of parastomal hernia with obstruction and without gangrene is scheduled for robotic assisted parastomal hernia repair with mesh/ possible open .  *** Pt c/o right nephrostomy tube malfunction since today morning. Pt 's wife said , she tried to flush the tube but was unable . Pt reports, he typically urinates through urethra only when nephrostomy is clogged , currently  c/o burning and pain sensation while urinating .  Pt went to ER after PST appointment  for right side nephrostomy tube malfunction.     59 year old male with hx of  PMHx of HTN, HIV, Anxiety, prostate - anal CA (s/p XRT c/b perforated anus/rectum, s/p colostomy), hx of b/l metal stent placement (on 12/12/23, s/p stent removal and b/l percutaneous nephrostomies on 2/19/25) 2/2 radiation,  presented to the ED on 3/11/25 for complaints of clogged left nephrostomy tube malfunction , IR consulted , s/p Bilateral nephrostogram shows long segment narrowing in the distal 1/3rd of the ureter.KUB checked , tube in position. Also noted to be in acute UTI -completed IV cefepime, pt was d/c'd home on 03/15/2025 on Vantin X 4 days. Currently pt c/o few months of abdominal pain, started after an episode of constipation. Now taking morphine ER 15mg TID. CT with parastomal hernia containing loop of unobstructed bowel presents to PST with pre op dx of parastomal hernia with obstruction and without gangrene is scheduled for robotic assisted parastomal hernia repair with mesh/ possible open .  *** Pt c/o right nephrostomy tube malfunction since today morning. Pt 's wife said , she tried to flush the tube but was unable . Pt reports, he typically urinates through urethra only when nephrostomy is clogged , currently  c/o burning and pain sensation while urinating .  Pt went to ER after PST appointment  for right side nephrostomy tube malfunction.     59 year old male with hx  of HTN, HIV , Anxiety, prostate ca- anal CA (s/p XRT c/b perforated anus/rectum, s/p colostomy), hx of b/l metal stent placement (on 12/12/23, s/p stent removal and b/l percutaneous nephrostomies on 2/19/25) 2/2 radiation,  presented to the ED on 3/11/25 for complaints of clogged left nephrostomy tube malfunction , IR consulted , s/p Bilateral nephrostogram shows long segment narrowing in the distal 1/3rd of the ureter. KUB checked, tube in position. Also noted to be in acute UTI -completed IV cefepime, pt was d/c'd home on 03/15/2025 on Vantin X 4 days. Currently pt c/o few months of abdominal pain, started after an episode of constipation, now taking morphine ER 15mg TID, had CT with parastomal hernia containing loop of unobstructed bowel presents to PST with pre op dx of parastomal hernia with obstruction and without gangrene is scheduled for robotic assisted parastomal hernia repair with mesh/ possible open .  *** Pt c/o right nephrostomy tube malfunction since today morning. Pt 's wife said , she tried to flush the tube but was unable . Pt reports, he typically urinates through urethra only when nephrostomy is clogged , currently c/o burning and pain sensation while urinating .  Pt went to ER after PST appointment  for right side nephrostomy tube malfunction.

## 2025-04-07 NOTE — ED CDU PROVIDER DISPOSITION NOTE - NSFOLLOWUPINSTRUCTIONS_ED_ALL_ED_FT
Take Oxycodone one tablet by mouth every 6 hours as needed for severe pain and do not drink drive or operate machinery while taking this medication as it can cause drowsiness    take tylenol over the counter as directed as needed for pain    take cefdinir one tablet by mouth twice daily with food for 7 days    return to ER if any new, worsening or persistent fevers

## 2025-04-07 NOTE — H&P PST ADULT - PROBLEM SELECTOR PLAN 1
Patient tentatively scheduled for robotic assisted parastomal hernia repair with mesh/ possible open on 04/10/2025  Pre-op instructions provided. Pt given verbal and written instructions with teach back on chlorhexidine wash . Pt verbalized understanding with return demonstration.  Labs done. Patient tentatively scheduled for robotic assisted parastomal hernia repair with mesh/ possible open on 04/10/2025  Pre-op instructions provided. Pt given verbal and written instructions with teach back on chlorhexidine wash . Pt verbalized understanding with return demonstration.  Labs done.  Urine c/s results pending.  ABO done 08/17/2023

## 2025-04-07 NOTE — ED PROVIDER NOTE - NSICDXPASTMEDICALHX_GEN_ALL_CORE_FT
PAST MEDICAL HISTORY:  Anxiety     History of anal cancer     History of prostate cancer     HIV infection     HLD (hyperlipidemia)     HTN (hypertension)     Parastomal hernia with obstruction and without gangrene

## 2025-04-07 NOTE — ED CDU PROVIDER INITIAL DAY NOTE - GASTROINTESTINAL, MLM
Abdomen soft, non-tender, no rebound, no guarding. Colostomy bag intact. Bilateral nephrostomy tubes draining yellow urine.

## 2025-04-07 NOTE — ED ADULT NURSE NOTE - NS ED NOTE ABUSE SUSPICION NEGLECT YN
Remote Patient Kit Ordering Note      Date/Time:  5/18/2023 11:16 AM  Spoke to the daughter Lavinia Powers    [x] CCSS confirmed patient shipping address- daughter's address- [address] [place] [address]. [x] Patient will receive package over the next 2-4 business days. Someone 21 years or older must be present to sign for UPS delivery. [x] Patient to contact virtual installation-specific phone number listed in the patient instructions. [x] If the patient does not contact HRS within 24 hours, an [employer] [facility] will call the patient directly: If the patient does not answer, HRS will follow up with the clinical team notifying them about the unsuccessful attempt to contact the patient. HRS will make three call attempts to the patient. [x] ACM will contact patient once equipment is active to welcome them to the program.                                                         [x] Hours of RPM monitoring - Monday-Friday [id number]                     All questions answered at this time. ACM made aware the RPM kit has been ordered. CCSS notified patient of RPM equipment order.
No

## 2025-04-07 NOTE — H&P PST ADULT - CARDIOVASCULAR COMMENTS
see hpi hx of   anal CA (s/p XRT c/b perforated anus/rectum, s/p colostomy), hx of b/l metal stent placement (on 12/12/23, s/p stent removal and b/l percutaneous nephrostomies on 2/19/25)  .Currently pt c/o few months of abdominal pain, started after an episode of constipation. Now taking morphine ER 15mg TID. CT with parastomal hernia containing loop of unobstructed bowel hx of   anal CA (s/p XRT c/b perforated anus/rectum, s/p colostomy), hx of b/l metal stent placement   2/2 radiation (on 12/12/23, s/p stent removal and b/l percutaneous nephrostomies on 2/19/25)  .Currently pt c/o few months of abdominal pain, started after an episode of constipation. Now taking morphine ER 15mg TID. CT with parastomal hernia containing loop of unobstructed bowel

## 2025-04-07 NOTE — H&P PST ADULT - GENITOURINARY COMMENTS
see hpi hx of   b/l percutaneous nephrostomies .Pt c/o right nephrostomy tube malfunction since today morning. Pt 's wife said , she tried to flush the tube but was unable . Pt reports, he typically urinates through urethra only when nephrostomy is clogged -currently  c/o burning and pain sensation while urinating . hx of   b/l percutaneous nephrostomies  2/2 radiation,  .Pt c/o right nephrostomy tube malfunction since today morning. Pt 's wife said , she tried to flush the tube but was unable . Pt reports, he typically urinates through urethra only when nephrostomy is clogged -currently  c/o burning and pain sensation while urinating .

## 2025-04-07 NOTE — ED CDU PROVIDER INITIAL DAY NOTE - ATTENDING APP SHARED VISIT CONTRIBUTION OF CARE
agree with PA note    my initial ED note  "Agree with resident note  59-year-old male with history of hypertension, HIV, history of prostate anal cancer, history of bilateral percutaneous nephrostomy tubes presents for tube malfunction.  States right was not draining and left was minimally draining.  Was at preop today for hernia repair.  Endorsed decreased drainage to no drainage nephrostomy tubes and was instructed to come to the emergency room surgery was scheduled for this Thursday with Dr. Bailey.  Patient denies vomiting or fevers.  Physical exam  Well-appearing male in no respiratory distress  Vital signs stable  Clear to auscultation bilaterally  S1-S2 no murmurs rubs or gallops  Abdomen colostomy bag in place minimal output   bilateral nephrostomy tubes right back no urine left bag minimal cloudy urine  Impression  Patient will need tube exchange of bilateral nephrostomy tubes will contact IR  Will likely have to be placed in CDU versus admitted will sign out to incoming attending  Have instructed resident to contact surgery given scheduled for surgery on Thursday and possibly can have surgery as an inpatient."    IR replaced tubes, to be observed in CDU agree with PA note    my initial ED note  "Agree with resident note  59-year-old male with history of hypertension, HIV, history of prostate anal cancer, history of bilateral percutaneous nephrostomy tubes presents for tube malfunction.  States right was not draining and left was minimally draining.  Was at preop today for hernia repair.  Endorsed decreased drainage to no drainage nephrostomy tubes and was instructed to come to the emergency room surgery was scheduled for this Thursday with Dr. Bailey.  Patient denies vomiting or fevers.  Physical exam  Well-appearing male in no respiratory distress  Vital signs stable  Clear to auscultation bilaterally  S1-S2 no murmurs rubs or gallops  Abdomen colostomy bag in place minimal output   bilateral nephrostomy tubes right back no urine left bag minimal cloudy urine  Impression  Patient will need tube exchange of bilateral nephrostomy tubes will contact IR  Will likely have to be placed in CDU versus admitted will sign out to incoming attending  Have instructed resident to contact surgery given scheduled for surgery on Thursday and possibly can have surgery as an inpatient."    IR replaced tubes, to be observed in CDU.

## 2025-04-07 NOTE — H&P PST ADULT - NEGATIVE GASTROINTESTINAL SYMPTOMS
no nausea/no vomiting/no diarrhea/no constipation/no melena/no hematochezia/no steatorrhea/no jaundice/no hiccoughs no nausea/no vomiting/no diarrhea/no constipation/no melena

## 2025-04-07 NOTE — ED ADULT NURSE NOTE - HOW PATIENT ADDRESSED, PROFILE
PROCEDURES:  Hysteroscopy with dilation and curettage of uterus using MyoSure device 03-Sep-2020 08:23:58  Philippe Rose David

## 2025-04-07 NOTE — H&P PST ADULT - NSICDXPASTSURGICALHX_GEN_ALL_CORE_FT
PAST SURGICAL HISTORY:  Anal lesion     History of gastroscopy     S/P colostomy     Ureteral stent present      PAST SURGICAL HISTORY:  Nephrostomy present     S/P colostomy

## 2025-04-07 NOTE — ED PROVIDER NOTE - OBJECTIVE STATEMENT
59M hx of HTN, HIV, hx of prostate and anal CA (s/p XRT c/b perforated anus/rectum, s/p colostomy), hx of b/l metal stent placement (on 12/12/23, s/p stent removal and b/l percutaneous nephrostomies on 2/19/25), and anxiety presents today for R nephrostomy tube malfunction. Reports it has not been draining since Thursday. States they were last exchanged in March at [facility] by IR. Also notes he is urinating from his penis which is burning. States he is also having abdominal pain, located around his colostomy bag. Reports he has had this pain for the past few months, is being followed by surgery outpatient with plan for hernia repair on Thursday. States he has also been having decreased output into his colostomy bag. He states this may be due to Morphine - taking 30mg daily for pain. Recently started stool softener. Denies fever, chills, nausea, vomiting.

## 2025-04-07 NOTE — ED CDU PROVIDER INITIAL DAY NOTE - PROGRESS NOTE DETAILS
Spoke with TANIA GARCIA, recommending nephrostomy tube outpt monitoring every 4-6 hours, and to flush with 5cc twice a day. RN provider order placed.

## 2025-04-09 LAB
-  AMOXICILLIN/CLAVULANIC ACID: SIGNIFICANT CHANGE UP
-  AMOXICILLIN/CLAVULANIC ACID: SIGNIFICANT CHANGE UP
-  AMPICILLIN/SULBACTAM: SIGNIFICANT CHANGE UP
-  AMPICILLIN/SULBACTAM: SIGNIFICANT CHANGE UP
-  AMPICILLIN: SIGNIFICANT CHANGE UP
-  AMPICILLIN: SIGNIFICANT CHANGE UP
-  AZTREONAM: SIGNIFICANT CHANGE UP
-  AZTREONAM: SIGNIFICANT CHANGE UP
-  CEFAZOLIN: SIGNIFICANT CHANGE UP
-  CEFAZOLIN: SIGNIFICANT CHANGE UP
-  CEFEPIME: SIGNIFICANT CHANGE UP
-  CEFEPIME: SIGNIFICANT CHANGE UP
-  CEFOXITIN: SIGNIFICANT CHANGE UP
-  CEFOXITIN: SIGNIFICANT CHANGE UP
-  CEFTRIAXONE: SIGNIFICANT CHANGE UP
-  CEFTRIAXONE: SIGNIFICANT CHANGE UP
-  CIPROFLOXACIN: SIGNIFICANT CHANGE UP
-  CIPROFLOXACIN: SIGNIFICANT CHANGE UP
-  ERTAPENEM: SIGNIFICANT CHANGE UP
-  ERTAPENEM: SIGNIFICANT CHANGE UP
-  GENTAMICIN: SIGNIFICANT CHANGE UP
-  GENTAMICIN: SIGNIFICANT CHANGE UP
-  IMIPENEM: SIGNIFICANT CHANGE UP
-  IMIPENEM: SIGNIFICANT CHANGE UP
-  LEVOFLOXACIN: SIGNIFICANT CHANGE UP
-  LEVOFLOXACIN: SIGNIFICANT CHANGE UP
-  MEROPENEM: SIGNIFICANT CHANGE UP
-  MEROPENEM: SIGNIFICANT CHANGE UP
-  NITROFURANTOIN: SIGNIFICANT CHANGE UP
-  NITROFURANTOIN: SIGNIFICANT CHANGE UP
-  PIPERACILLIN/TAZOBACTAM: SIGNIFICANT CHANGE UP
-  PIPERACILLIN/TAZOBACTAM: SIGNIFICANT CHANGE UP
-  TOBRAMYCIN: SIGNIFICANT CHANGE UP
-  TOBRAMYCIN: SIGNIFICANT CHANGE UP
-  TRIMETHOPRIM/SULFAMETHOXAZOLE: SIGNIFICANT CHANGE UP
-  TRIMETHOPRIM/SULFAMETHOXAZOLE: SIGNIFICANT CHANGE UP
CULTURE RESULTS: ABNORMAL
CULTURE RESULTS: ABNORMAL
METHOD TYPE: SIGNIFICANT CHANGE UP
METHOD TYPE: SIGNIFICANT CHANGE UP
ORGANISM # SPEC MICROSCOPIC CNT: ABNORMAL
SPECIMEN SOURCE: SIGNIFICANT CHANGE UP
SPECIMEN SOURCE: SIGNIFICANT CHANGE UP

## 2025-04-10 ENCOUNTER — APPOINTMENT (OUTPATIENT)
Dept: TRAUMA SURGERY | Facility: HOSPITAL | Age: 60
End: 2025-04-10

## 2025-04-10 ENCOUNTER — INPATIENT (INPATIENT)
Facility: HOSPITAL | Age: 60
LOS: 1 days | Discharge: HOME | End: 2025-04-12
Attending: STUDENT IN AN ORGANIZED HEALTH CARE EDUCATION/TRAINING PROGRAM | Admitting: STUDENT IN AN ORGANIZED HEALTH CARE EDUCATION/TRAINING PROGRAM
Payer: MEDICAID

## 2025-04-10 VITALS
HEART RATE: 85 BPM | SYSTOLIC BLOOD PRESSURE: 115 MMHG | HEIGHT: 71 IN | OXYGEN SATURATION: 100 % | DIASTOLIC BLOOD PRESSURE: 88 MMHG | RESPIRATION RATE: 16 BRPM | TEMPERATURE: 98 F | WEIGHT: 184.09 LBS

## 2025-04-10 DIAGNOSIS — Z93.3 COLOSTOMY STATUS: Chronic | ICD-10-CM

## 2025-04-10 DIAGNOSIS — K43.3 PARASTOMAL HERNIA WITH OBSTRUCTION, WITHOUT GANGRENE: ICD-10-CM

## 2025-04-10 DIAGNOSIS — Z93.6 OTHER ARTIFICIAL OPENINGS OF URINARY TRACT STATUS: Chronic | ICD-10-CM

## 2025-04-10 LAB — GLUCOSE BLDC GLUCOMTR-MCNC: 122 MG/DL — HIGH (ref 70–99)

## 2025-04-10 PROCEDURE — 49621 RPR PARASTOMAL HERNIA RDC: CPT | Mod: GC

## 2025-04-10 PROCEDURE — S2900 ROBOTIC SURGICAL SYSTEM: CPT | Mod: NC

## 2025-04-10 DEVICE — IMPLANTABLE DEVICE: Type: IMPLANTABLE DEVICE | Status: FUNCTIONAL

## 2025-04-10 RX ORDER — TRAZODONE HCL 100 MG
1 TABLET ORAL
Refills: 0 | DISCHARGE

## 2025-04-10 RX ORDER — OXYCODONE HYDROCHLORIDE 30 MG/1
5 TABLET ORAL EVERY 4 HOURS
Refills: 0 | Status: DISCONTINUED | OUTPATIENT
Start: 2025-04-10 | End: 2025-04-11

## 2025-04-10 RX ORDER — BICTEGRAVIR SODIUM, EMTRICITABINE, AND TENOFOVIR ALAFENAMIDE FUMARATE 50; 200; 25 MG/1; MG/1; MG/1
1 TABLET ORAL DAILY
Refills: 0 | Status: DISCONTINUED | OUTPATIENT
Start: 2025-04-10 | End: 2025-04-12

## 2025-04-10 RX ORDER — DILTIAZEM HYDROCHLORIDE 240 MG/1
240 TABLET, EXTENDED RELEASE ORAL DAILY
Refills: 0 | Status: DISCONTINUED | OUTPATIENT
Start: 2025-04-10 | End: 2025-04-12

## 2025-04-10 RX ORDER — BUPROPION HYDROBROMIDE 522 MG/1
2 TABLET, EXTENDED RELEASE ORAL
Refills: 0 | DISCHARGE

## 2025-04-10 RX ORDER — SODIUM CHLORIDE 9 G/1000ML
1000 INJECTION, SOLUTION INTRAVENOUS
Refills: 0 | Status: DISCONTINUED | OUTPATIENT
Start: 2025-04-10 | End: 2025-04-11

## 2025-04-10 RX ORDER — METOPROLOL SUCCINATE 50 MG/1
1 TABLET, EXTENDED RELEASE ORAL
Refills: 0 | DISCHARGE

## 2025-04-10 RX ORDER — CLONAZEPAM 0.5 MG/1
1 TABLET ORAL
Refills: 0 | DISCHARGE

## 2025-04-10 RX ORDER — CYCLOBENZAPRINE HYDROCHLORIDE 15 MG/1
10 CAPSULE, EXTENDED RELEASE ORAL EVERY 8 HOURS
Refills: 0 | Status: DISCONTINUED | OUTPATIENT
Start: 2025-04-10 | End: 2025-04-12

## 2025-04-10 RX ORDER — METOPROLOL SUCCINATE 50 MG/1
100 TABLET, EXTENDED RELEASE ORAL DAILY
Refills: 0 | Status: DISCONTINUED | OUTPATIENT
Start: 2025-04-10 | End: 2025-04-12

## 2025-04-10 RX ORDER — POLYETHYLENE GLYCOL 3350 17 G/17G
17 POWDER, FOR SOLUTION ORAL
Refills: 0 | Status: DISCONTINUED | OUTPATIENT
Start: 2025-04-10 | End: 2025-04-12

## 2025-04-10 RX ORDER — EZETIMIBE 10 MG/1
1 TABLET ORAL
Refills: 0 | DISCHARGE

## 2025-04-10 RX ORDER — HEPARIN SODIUM 1000 [USP'U]/ML
5000 INJECTION INTRAVENOUS; SUBCUTANEOUS EVERY 8 HOURS
Refills: 0 | Status: DISCONTINUED | OUTPATIENT
Start: 2025-04-10 | End: 2025-04-12

## 2025-04-10 RX ORDER — HYDROMORPHONE/SOD CHLOR,ISO/PF 2 MG/10 ML
0.2 SYRINGE (ML) INJECTION EVERY 4 HOURS
Refills: 0 | Status: DISCONTINUED | OUTPATIENT
Start: 2025-04-10 | End: 2025-04-11

## 2025-04-10 RX ORDER — HYDROMORPHONE/SOD CHLOR,ISO/PF 2 MG/10 ML
0.5 SYRINGE (ML) INJECTION
Refills: 0 | Status: DISCONTINUED | OUTPATIENT
Start: 2025-04-10 | End: 2025-04-10

## 2025-04-10 RX ORDER — EZETIMIBE 10 MG/1
10 TABLET ORAL AT BEDTIME
Refills: 0 | Status: DISCONTINUED | OUTPATIENT
Start: 2025-04-10 | End: 2025-04-12

## 2025-04-10 RX ORDER — BUPROPION HYDROBROMIDE 522 MG/1
150 TABLET, EXTENDED RELEASE ORAL DAILY
Refills: 0 | Status: DISCONTINUED | OUTPATIENT
Start: 2025-04-10 | End: 2025-04-12

## 2025-04-10 RX ORDER — DILTIAZEM HYDROCHLORIDE 240 MG/1
1 TABLET, EXTENDED RELEASE ORAL
Refills: 0 | DISCHARGE

## 2025-04-10 RX ORDER — SENNA 187 MG
2 TABLET ORAL AT BEDTIME
Refills: 0 | Status: DISCONTINUED | OUTPATIENT
Start: 2025-04-10 | End: 2025-04-10

## 2025-04-10 RX ORDER — ACETAMINOPHEN 500 MG/5ML
1000 LIQUID (ML) ORAL EVERY 6 HOURS
Refills: 0 | Status: COMPLETED | OUTPATIENT
Start: 2025-04-10 | End: 2025-04-11

## 2025-04-10 RX ORDER — OXYCODONE HYDROCHLORIDE 30 MG/1
10 TABLET ORAL EVERY 4 HOURS
Refills: 0 | Status: DISCONTINUED | OUTPATIENT
Start: 2025-04-10 | End: 2025-04-11

## 2025-04-10 RX ORDER — DILTIAZEM HYDROCHLORIDE 240 MG/1
240 TABLET, EXTENDED RELEASE ORAL DAILY
Refills: 0 | Status: DISCONTINUED | OUTPATIENT
Start: 2025-04-10 | End: 2025-04-10

## 2025-04-10 RX ORDER — METOPROLOL SUCCINATE 50 MG/1
100 TABLET, EXTENDED RELEASE ORAL DAILY
Refills: 0 | Status: DISCONTINUED | OUTPATIENT
Start: 2025-04-10 | End: 2025-04-10

## 2025-04-10 RX ORDER — SENNA 187 MG
2 TABLET ORAL AT BEDTIME
Refills: 0 | Status: DISCONTINUED | OUTPATIENT
Start: 2025-04-10 | End: 2025-04-12

## 2025-04-10 RX ORDER — TRAZODONE HCL 100 MG
50 TABLET ORAL AT BEDTIME
Refills: 0 | Status: DISCONTINUED | OUTPATIENT
Start: 2025-04-10 | End: 2025-04-12

## 2025-04-10 RX ORDER — ACETAMINOPHEN 500 MG/5ML
650 LIQUID (ML) ORAL EVERY 6 HOURS
Refills: 0 | Status: DISCONTINUED | OUTPATIENT
Start: 2025-04-10 | End: 2025-04-10

## 2025-04-10 RX ADMIN — Medication 0.5 MILLIGRAM(S): at 20:19

## 2025-04-10 RX ADMIN — CYCLOBENZAPRINE HYDROCHLORIDE 10 MILLIGRAM(S): 15 CAPSULE, EXTENDED RELEASE ORAL at 21:11

## 2025-04-10 RX ADMIN — Medication 0.5 MILLIGRAM(S): at 20:42

## 2025-04-10 RX ADMIN — Medication 0.5 MILLIGRAM(S): at 19:37

## 2025-04-10 RX ADMIN — Medication 0.5 MILLIGRAM(S): at 20:27

## 2025-04-10 RX ADMIN — EZETIMIBE 10 MILLIGRAM(S): 10 TABLET ORAL at 21:21

## 2025-04-10 RX ADMIN — HEPARIN SODIUM 5000 UNIT(S): 1000 INJECTION INTRAVENOUS; SUBCUTANEOUS at 21:11

## 2025-04-10 RX ADMIN — Medication 2 TABLET(S): at 21:11

## 2025-04-10 RX ADMIN — Medication 0.5 MILLIGRAM(S): at 20:04

## 2025-04-10 RX ADMIN — Medication 400 MILLIGRAM(S): at 23:28

## 2025-04-10 RX ADMIN — Medication 50 MILLIGRAM(S): at 21:11

## 2025-04-10 RX ADMIN — SODIUM CHLORIDE 30 MILLILITER(S): 9 INJECTION, SOLUTION INTRAVENOUS at 21:46

## 2025-04-10 RX ADMIN — Medication 0.5 MILLIGRAM(S): at 20:00

## 2025-04-11 ENCOUNTER — TRANSCRIPTION ENCOUNTER (OUTPATIENT)
Age: 60
End: 2025-04-11

## 2025-04-11 LAB
ANION GAP SERPL CALC-SCNC: 10 MMOL/L — SIGNIFICANT CHANGE UP (ref 7–14)
BUN SERPL-MCNC: 14 MG/DL — SIGNIFICANT CHANGE UP (ref 7–23)
CALCIUM SERPL-MCNC: 9 MG/DL — SIGNIFICANT CHANGE UP (ref 8.4–10.5)
CHLORIDE SERPL-SCNC: 103 MMOL/L — SIGNIFICANT CHANGE UP (ref 98–107)
CO2 SERPL-SCNC: 23 MMOL/L — SIGNIFICANT CHANGE UP (ref 22–31)
CREAT SERPL-MCNC: 0.83 MG/DL — SIGNIFICANT CHANGE UP (ref 0.5–1.3)
EGFR: 101 ML/MIN/1.73M2 — SIGNIFICANT CHANGE UP
EGFR: 101 ML/MIN/1.73M2 — SIGNIFICANT CHANGE UP
GLUCOSE SERPL-MCNC: 128 MG/DL — HIGH (ref 70–99)
HCT VFR BLD CALC: 38.2 % — LOW (ref 39–50)
HGB BLD-MCNC: 12.8 G/DL — LOW (ref 13–17)
MAGNESIUM SERPL-MCNC: 2.1 MG/DL — SIGNIFICANT CHANGE UP (ref 1.6–2.6)
MCHC RBC-ENTMCNC: 29.9 PG — SIGNIFICANT CHANGE UP (ref 27–34)
MCHC RBC-ENTMCNC: 33.5 G/DL — SIGNIFICANT CHANGE UP (ref 32–36)
MCV RBC AUTO: 89.3 FL — SIGNIFICANT CHANGE UP (ref 80–100)
NRBC # BLD AUTO: 0 K/UL — SIGNIFICANT CHANGE UP (ref 0–0)
NRBC # FLD: 0 K/UL — SIGNIFICANT CHANGE UP (ref 0–0)
NRBC BLD AUTO-RTO: 0 /100 WBCS — SIGNIFICANT CHANGE UP (ref 0–0)
PHOSPHATE SERPL-MCNC: 1.9 MG/DL — LOW (ref 2.5–4.5)
PLATELET # BLD AUTO: 303 K/UL — SIGNIFICANT CHANGE UP (ref 150–400)
POTASSIUM SERPL-MCNC: 4.2 MMOL/L — SIGNIFICANT CHANGE UP (ref 3.5–5.3)
POTASSIUM SERPL-SCNC: 4.2 MMOL/L — SIGNIFICANT CHANGE UP (ref 3.5–5.3)
RBC # BLD: 4.28 M/UL — SIGNIFICANT CHANGE UP (ref 4.2–5.8)
RBC # FLD: 14.1 % — SIGNIFICANT CHANGE UP (ref 10.3–14.5)
SODIUM SERPL-SCNC: 136 MMOL/L — SIGNIFICANT CHANGE UP (ref 135–145)
WBC # BLD: 10.95 K/UL — HIGH (ref 3.8–10.5)
WBC # FLD AUTO: 10.95 K/UL — HIGH (ref 3.8–10.5)

## 2025-04-11 RX ORDER — HYDROMORPHONE/SOD CHLOR,ISO/PF 2 MG/10 ML
0.5 SYRINGE (ML) INJECTION
Refills: 0 | Status: DISCONTINUED | OUTPATIENT
Start: 2025-04-11 | End: 2025-04-12

## 2025-04-11 RX ORDER — HYDROMORPHONE/SOD CHLOR,ISO/PF 2 MG/10 ML
4 SYRINGE (ML) INJECTION EVERY 4 HOURS
Refills: 0 | Status: DISCONTINUED | OUTPATIENT
Start: 2025-04-11 | End: 2025-04-12

## 2025-04-11 RX ORDER — HYDROMORPHONE/SOD CHLOR,ISO/PF 2 MG/10 ML
6 SYRINGE (ML) INJECTION EVERY 4 HOURS
Refills: 0 | Status: DISCONTINUED | OUTPATIENT
Start: 2025-04-11 | End: 2025-04-12

## 2025-04-11 RX ORDER — HYDROMORPHONE/SOD CHLOR,ISO/PF 2 MG/10 ML
0.5 SYRINGE (ML) INJECTION ONCE
Refills: 0 | Status: DISCONTINUED | OUTPATIENT
Start: 2025-04-11 | End: 2025-04-11

## 2025-04-11 RX ORDER — LACTULOSE 10 G/15ML
10 SOLUTION ORAL THREE TIMES A DAY
Refills: 0 | Status: DISCONTINUED | OUTPATIENT
Start: 2025-04-11 | End: 2025-04-12

## 2025-04-11 RX ORDER — SOD PHOS DI, MONO/K PHOS MONO 250 MG
2 TABLET ORAL EVERY 4 HOURS
Refills: 0 | Status: COMPLETED | OUTPATIENT
Start: 2025-04-11 | End: 2025-04-11

## 2025-04-11 RX ORDER — LIDOCAINE HYDROCHLORIDE 20 MG/ML
2 JELLY TOPICAL EVERY 24 HOURS
Refills: 0 | Status: DISCONTINUED | OUTPATIENT
Start: 2025-04-11 | End: 2025-04-12

## 2025-04-11 RX ORDER — GABAPENTIN 400 MG/1
300 CAPSULE ORAL THREE TIMES A DAY
Refills: 0 | Status: DISCONTINUED | OUTPATIENT
Start: 2025-04-11 | End: 2025-04-12

## 2025-04-11 RX ADMIN — CYCLOBENZAPRINE HYDROCHLORIDE 10 MILLIGRAM(S): 15 CAPSULE, EXTENDED RELEASE ORAL at 22:30

## 2025-04-11 RX ADMIN — HEPARIN SODIUM 5000 UNIT(S): 1000 INJECTION INTRAVENOUS; SUBCUTANEOUS at 21:38

## 2025-04-11 RX ADMIN — LACTULOSE 10 GRAM(S): 10 SOLUTION ORAL at 14:30

## 2025-04-11 RX ADMIN — LIDOCAINE HYDROCHLORIDE 2 PATCH: 20 JELLY TOPICAL at 10:44

## 2025-04-11 RX ADMIN — OXYCODONE HYDROCHLORIDE 10 MILLIGRAM(S): 30 TABLET ORAL at 08:33

## 2025-04-11 RX ADMIN — Medication 1000 MILLIGRAM(S): at 14:10

## 2025-04-11 RX ADMIN — Medication 50 MILLIGRAM(S): at 21:38

## 2025-04-11 RX ADMIN — Medication 0.2 MILLIGRAM(S): at 05:46

## 2025-04-11 RX ADMIN — METOPROLOL SUCCINATE 100 MILLIGRAM(S): 50 TABLET, EXTENDED RELEASE ORAL at 05:21

## 2025-04-11 RX ADMIN — LIDOCAINE HYDROCHLORIDE 2 PATCH: 20 JELLY TOPICAL at 19:17

## 2025-04-11 RX ADMIN — HEPARIN SODIUM 5000 UNIT(S): 1000 INJECTION INTRAVENOUS; SUBCUTANEOUS at 05:21

## 2025-04-11 RX ADMIN — CYCLOBENZAPRINE HYDROCHLORIDE 10 MILLIGRAM(S): 15 CAPSULE, EXTENDED RELEASE ORAL at 05:22

## 2025-04-11 RX ADMIN — Medication 0.5 MILLIGRAM(S): at 11:13

## 2025-04-11 RX ADMIN — DILTIAZEM HYDROCHLORIDE 240 MILLIGRAM(S): 240 TABLET, EXTENDED RELEASE ORAL at 05:22

## 2025-04-11 RX ADMIN — Medication 6 MILLIGRAM(S): at 18:09

## 2025-04-11 RX ADMIN — LACTULOSE 10 GRAM(S): 10 SOLUTION ORAL at 21:37

## 2025-04-11 RX ADMIN — Medication 400 MILLIGRAM(S): at 13:40

## 2025-04-11 RX ADMIN — Medication 2 TABLET(S): at 09:26

## 2025-04-11 RX ADMIN — BUPROPION HYDROBROMIDE 150 MILLIGRAM(S): 522 TABLET, EXTENDED RELEASE ORAL at 12:29

## 2025-04-11 RX ADMIN — LIDOCAINE HYDROCHLORIDE 2 PATCH: 20 JELLY TOPICAL at 23:45

## 2025-04-11 RX ADMIN — Medication 6 MILLIGRAM(S): at 18:39

## 2025-04-11 RX ADMIN — Medication 0.2 MILLIGRAM(S): at 05:16

## 2025-04-11 RX ADMIN — Medication 400 MILLIGRAM(S): at 04:00

## 2025-04-11 RX ADMIN — GABAPENTIN 300 MILLIGRAM(S): 400 CAPSULE ORAL at 14:29

## 2025-04-11 RX ADMIN — BICTEGRAVIR SODIUM, EMTRICITABINE, AND TENOFOVIR ALAFENAMIDE FUMARATE 1 TABLET(S): 50; 200; 25 TABLET ORAL at 12:40

## 2025-04-11 RX ADMIN — GABAPENTIN 300 MILLIGRAM(S): 400 CAPSULE ORAL at 21:38

## 2025-04-11 RX ADMIN — OXYCODONE HYDROCHLORIDE 10 MILLIGRAM(S): 30 TABLET ORAL at 09:03

## 2025-04-11 RX ADMIN — POLYETHYLENE GLYCOL 3350 17 GRAM(S): 17 POWDER, FOR SOLUTION ORAL at 17:43

## 2025-04-11 RX ADMIN — Medication 1000 MILLIGRAM(S): at 18:13

## 2025-04-11 RX ADMIN — Medication 400 MILLIGRAM(S): at 17:43

## 2025-04-11 RX ADMIN — Medication 0.5 MILLIGRAM(S): at 10:43

## 2025-04-11 RX ADMIN — EZETIMIBE 10 MILLIGRAM(S): 10 TABLET ORAL at 21:38

## 2025-04-11 RX ADMIN — Medication 6 MILLIGRAM(S): at 23:10

## 2025-04-11 RX ADMIN — Medication 6 MILLIGRAM(S): at 22:30

## 2025-04-11 RX ADMIN — Medication 2 TABLET(S): at 14:29

## 2025-04-11 RX ADMIN — Medication 2 TABLET(S): at 21:38

## 2025-04-11 RX ADMIN — CYCLOBENZAPRINE HYDROCHLORIDE 10 MILLIGRAM(S): 15 CAPSULE, EXTENDED RELEASE ORAL at 14:08

## 2025-04-11 RX ADMIN — POLYETHYLENE GLYCOL 3350 17 GRAM(S): 17 POWDER, FOR SOLUTION ORAL at 05:22

## 2025-04-11 RX ADMIN — HEPARIN SODIUM 5000 UNIT(S): 1000 INJECTION INTRAVENOUS; SUBCUTANEOUS at 14:30

## 2025-04-12 ENCOUNTER — TRANSCRIPTION ENCOUNTER (OUTPATIENT)
Age: 60
End: 2025-04-12

## 2025-04-12 VITALS
TEMPERATURE: 98 F | RESPIRATION RATE: 17 BRPM | OXYGEN SATURATION: 96 % | HEART RATE: 85 BPM | DIASTOLIC BLOOD PRESSURE: 73 MMHG | SYSTOLIC BLOOD PRESSURE: 135 MMHG

## 2025-04-12 LAB
ANION GAP SERPL CALC-SCNC: 13 MMOL/L — SIGNIFICANT CHANGE UP (ref 7–14)
BUN SERPL-MCNC: 15 MG/DL — SIGNIFICANT CHANGE UP (ref 7–23)
CALCIUM SERPL-MCNC: 9.3 MG/DL — SIGNIFICANT CHANGE UP (ref 8.4–10.5)
CHLORIDE SERPL-SCNC: 103 MMOL/L — SIGNIFICANT CHANGE UP (ref 98–107)
CO2 SERPL-SCNC: 24 MMOL/L — SIGNIFICANT CHANGE UP (ref 22–31)
CREAT SERPL-MCNC: 0.9 MG/DL — SIGNIFICANT CHANGE UP (ref 0.5–1.3)
EGFR: 99 ML/MIN/1.73M2 — SIGNIFICANT CHANGE UP
EGFR: 99 ML/MIN/1.73M2 — SIGNIFICANT CHANGE UP
GLUCOSE SERPL-MCNC: 112 MG/DL — HIGH (ref 70–99)
HCT VFR BLD CALC: 39.4 % — SIGNIFICANT CHANGE UP (ref 39–50)
HGB BLD-MCNC: 13.6 G/DL — SIGNIFICANT CHANGE UP (ref 13–17)
MAGNESIUM SERPL-MCNC: 2.1 MG/DL — SIGNIFICANT CHANGE UP (ref 1.6–2.6)
MCHC RBC-ENTMCNC: 30 PG — SIGNIFICANT CHANGE UP (ref 27–34)
MCHC RBC-ENTMCNC: 34.5 G/DL — SIGNIFICANT CHANGE UP (ref 32–36)
MCV RBC AUTO: 86.8 FL — SIGNIFICANT CHANGE UP (ref 80–100)
NRBC # BLD AUTO: 0 K/UL — SIGNIFICANT CHANGE UP (ref 0–0)
NRBC # FLD: 0 K/UL — SIGNIFICANT CHANGE UP (ref 0–0)
NRBC BLD AUTO-RTO: 0 /100 WBCS — SIGNIFICANT CHANGE UP (ref 0–0)
PHOSPHATE SERPL-MCNC: 3.3 MG/DL — SIGNIFICANT CHANGE UP (ref 2.5–4.5)
PLATELET # BLD AUTO: 315 K/UL — SIGNIFICANT CHANGE UP (ref 150–400)
POTASSIUM SERPL-MCNC: 4.3 MMOL/L — SIGNIFICANT CHANGE UP (ref 3.5–5.3)
POTASSIUM SERPL-SCNC: 4.3 MMOL/L — SIGNIFICANT CHANGE UP (ref 3.5–5.3)
RBC # BLD: 4.54 M/UL — SIGNIFICANT CHANGE UP (ref 4.2–5.8)
RBC # FLD: 14.2 % — SIGNIFICANT CHANGE UP (ref 10.3–14.5)
SODIUM SERPL-SCNC: 140 MMOL/L — SIGNIFICANT CHANGE UP (ref 135–145)
WBC # BLD: 9.67 K/UL — SIGNIFICANT CHANGE UP (ref 3.8–10.5)
WBC # FLD AUTO: 9.67 K/UL — SIGNIFICANT CHANGE UP (ref 3.8–10.5)

## 2025-04-12 RX ORDER — HYDROMORPHONE/SOD CHLOR,ISO/PF 2 MG/10 ML
1 SYRINGE (ML) INJECTION
Qty: 28 | Refills: 0
Start: 2025-04-12 | End: 2025-04-18

## 2025-04-12 RX ORDER — CYCLOBENZAPRINE HYDROCHLORIDE 15 MG/1
1 CAPSULE, EXTENDED RELEASE ORAL
Qty: 42 | Refills: 0
Start: 2025-04-12 | End: 2025-04-25

## 2025-04-12 RX ORDER — CYCLOBENZAPRINE HYDROCHLORIDE 15 MG/1
1 CAPSULE, EXTENDED RELEASE ORAL
Refills: 0
Start: 2025-04-12

## 2025-04-12 RX ORDER — GABAPENTIN 400 MG/1
1 CAPSULE ORAL
Refills: 0
Start: 2025-04-12

## 2025-04-12 RX ORDER — GABAPENTIN 400 MG/1
1 CAPSULE ORAL
Qty: 90 | Refills: 0
Start: 2025-04-12 | End: 2025-05-11

## 2025-04-12 RX ADMIN — HEPARIN SODIUM 5000 UNIT(S): 1000 INJECTION INTRAVENOUS; SUBCUTANEOUS at 13:50

## 2025-04-12 RX ADMIN — Medication 6 MILLIGRAM(S): at 06:45

## 2025-04-12 RX ADMIN — DILTIAZEM HYDROCHLORIDE 240 MILLIGRAM(S): 240 TABLET, EXTENDED RELEASE ORAL at 06:05

## 2025-04-12 RX ADMIN — METOPROLOL SUCCINATE 100 MILLIGRAM(S): 50 TABLET, EXTENDED RELEASE ORAL at 06:05

## 2025-04-12 RX ADMIN — CYCLOBENZAPRINE HYDROCHLORIDE 10 MILLIGRAM(S): 15 CAPSULE, EXTENDED RELEASE ORAL at 13:50

## 2025-04-12 RX ADMIN — LIDOCAINE HYDROCHLORIDE 2 PATCH: 20 JELLY TOPICAL at 11:58

## 2025-04-12 RX ADMIN — LACTULOSE 10 GRAM(S): 10 SOLUTION ORAL at 06:04

## 2025-04-12 RX ADMIN — Medication 6 MILLIGRAM(S): at 06:04

## 2025-04-12 RX ADMIN — HEPARIN SODIUM 5000 UNIT(S): 1000 INJECTION INTRAVENOUS; SUBCUTANEOUS at 06:05

## 2025-04-12 RX ADMIN — BUPROPION HYDROBROMIDE 150 MILLIGRAM(S): 522 TABLET, EXTENDED RELEASE ORAL at 11:58

## 2025-04-12 RX ADMIN — Medication 6 MILLIGRAM(S): at 14:48

## 2025-04-12 RX ADMIN — CYCLOBENZAPRINE HYDROCHLORIDE 10 MILLIGRAM(S): 15 CAPSULE, EXTENDED RELEASE ORAL at 06:05

## 2025-04-12 RX ADMIN — GABAPENTIN 300 MILLIGRAM(S): 400 CAPSULE ORAL at 13:49

## 2025-04-12 RX ADMIN — GABAPENTIN 300 MILLIGRAM(S): 400 CAPSULE ORAL at 06:05

## 2025-04-12 RX ADMIN — BICTEGRAVIR SODIUM, EMTRICITABINE, AND TENOFOVIR ALAFENAMIDE FUMARATE 1 TABLET(S): 50; 200; 25 TABLET ORAL at 11:58

## 2025-04-12 RX ADMIN — LACTULOSE 10 GRAM(S): 10 SOLUTION ORAL at 13:50

## 2025-04-12 RX ADMIN — Medication 6 MILLIGRAM(S): at 14:18

## 2025-04-12 RX ADMIN — POLYETHYLENE GLYCOL 3350 17 GRAM(S): 17 POWDER, FOR SOLUTION ORAL at 06:05

## 2025-04-14 ENCOUNTER — NON-APPOINTMENT (OUTPATIENT)
Age: 60
End: 2025-04-14

## 2025-04-15 ENCOUNTER — NON-APPOINTMENT (OUTPATIENT)
Age: 60
End: 2025-04-15

## 2025-04-15 PROBLEM — K43.3 PARASTOMAL HERNIA WITH OBSTRUCTION, WITHOUT GANGRENE: Chronic | Status: ACTIVE | Noted: 2025-04-07

## 2025-04-16 ENCOUNTER — NON-APPOINTMENT (OUTPATIENT)
Age: 60
End: 2025-04-16

## 2025-04-24 ENCOUNTER — OUTPATIENT (OUTPATIENT)
Dept: OUTPATIENT SERVICES | Facility: HOSPITAL | Age: 60
LOS: 1 days | End: 2025-04-24

## 2025-04-24 ENCOUNTER — APPOINTMENT (OUTPATIENT)
Dept: INFECTIOUS DISEASE | Facility: CLINIC | Age: 60
End: 2025-04-24
Payer: MEDICAID

## 2025-04-24 DIAGNOSIS — Z93.6 OTHER ARTIFICIAL OPENINGS OF URINARY TRACT STATUS: Chronic | ICD-10-CM

## 2025-04-24 DIAGNOSIS — B20 HUMAN IMMUNODEFICIENCY VIRUS [HIV] DISEASE: ICD-10-CM

## 2025-04-24 PROCEDURE — 99214 OFFICE O/P EST MOD 30 MIN: CPT | Mod: 95

## 2025-04-24 PROCEDURE — G0463: CPT

## 2025-04-25 DIAGNOSIS — F33.1 MAJOR DEPRESSIVE DISORDER, RECURRENT, MODERATE: ICD-10-CM

## 2025-04-25 DIAGNOSIS — F41.9 ANXIETY DISORDER, UNSPECIFIED: ICD-10-CM

## 2025-05-08 ENCOUNTER — APPOINTMENT (OUTPATIENT)
Dept: TRAUMA SURGERY | Facility: CLINIC | Age: 60
End: 2025-05-08

## 2025-05-09 ENCOUNTER — EMERGENCY (EMERGENCY)
Facility: HOSPITAL | Age: 60
LOS: 0 days | Discharge: ANOTHER HOSPITAL | End: 2025-05-09
Attending: STUDENT IN AN ORGANIZED HEALTH CARE EDUCATION/TRAINING PROGRAM
Payer: MEDICAID

## 2025-05-09 ENCOUNTER — INPATIENT (INPATIENT)
Facility: HOSPITAL | Age: 60
LOS: 3 days | Discharge: HOME HEALTH | End: 2025-05-13
Attending: STUDENT IN AN ORGANIZED HEALTH CARE EDUCATION/TRAINING PROGRAM | Admitting: STUDENT IN AN ORGANIZED HEALTH CARE EDUCATION/TRAINING PROGRAM
Payer: MEDICAID

## 2025-05-09 VITALS
HEIGHT: 71 IN | RESPIRATION RATE: 18 BRPM | DIASTOLIC BLOOD PRESSURE: 89 MMHG | OXYGEN SATURATION: 99 % | TEMPERATURE: 98 F | SYSTOLIC BLOOD PRESSURE: 149 MMHG | HEART RATE: 92 BPM | WEIGHT: 184.09 LBS

## 2025-05-09 VITALS
RESPIRATION RATE: 16 BRPM | TEMPERATURE: 99 F | SYSTOLIC BLOOD PRESSURE: 157 MMHG | WEIGHT: 184.97 LBS | HEIGHT: 71 IN | OXYGEN SATURATION: 99 % | HEART RATE: 95 BPM | DIASTOLIC BLOOD PRESSURE: 92 MMHG

## 2025-05-09 VITALS
OXYGEN SATURATION: 99 % | DIASTOLIC BLOOD PRESSURE: 100 MMHG | HEART RATE: 87 BPM | SYSTOLIC BLOOD PRESSURE: 145 MMHG | RESPIRATION RATE: 18 BRPM | TEMPERATURE: 98 F

## 2025-05-09 DIAGNOSIS — Z93.6 OTHER ARTIFICIAL OPENINGS OF URINARY TRACT STATUS: Chronic | ICD-10-CM

## 2025-05-09 DIAGNOSIS — Z93.3 COLOSTOMY STATUS: Chronic | ICD-10-CM

## 2025-05-09 DIAGNOSIS — T83.098A OTHER MECHANICAL COMPLICATION OF OTHER URINARY CATHETER, INITIAL ENCOUNTER: ICD-10-CM

## 2025-05-09 LAB
ALBUMIN SERPL ELPH-MCNC: 3.6 G/DL — SIGNIFICANT CHANGE UP (ref 3.3–5)
ALP SERPL-CCNC: 106 U/L — SIGNIFICANT CHANGE UP (ref 40–120)
ALT FLD-CCNC: 16 U/L — SIGNIFICANT CHANGE UP (ref 12–78)
ANION GAP SERPL CALC-SCNC: 6 MMOL/L — SIGNIFICANT CHANGE UP (ref 5–17)
APPEARANCE UR: ABNORMAL
APTT BLD: 29.7 SEC — SIGNIFICANT CHANGE UP (ref 26.1–36.8)
AST SERPL-CCNC: 17 U/L — SIGNIFICANT CHANGE UP (ref 15–37)
BACTERIA # UR AUTO: ABNORMAL /HPF
BASOPHILS # BLD AUTO: 0.04 K/UL — SIGNIFICANT CHANGE UP (ref 0–0.2)
BASOPHILS NFR BLD AUTO: 0.3 % — SIGNIFICANT CHANGE UP (ref 0–2)
BILIRUB SERPL-MCNC: 0.8 MG/DL — SIGNIFICANT CHANGE UP (ref 0.2–1.2)
BILIRUB UR-MCNC: NEGATIVE — SIGNIFICANT CHANGE UP
BLD GP AB SCN SERPL QL: NEGATIVE — SIGNIFICANT CHANGE UP
BUN SERPL-MCNC: 19 MG/DL — SIGNIFICANT CHANGE UP (ref 7–23)
CALCIUM SERPL-MCNC: 10 MG/DL — SIGNIFICANT CHANGE UP (ref 8.5–10.1)
CHLORIDE SERPL-SCNC: 105 MMOL/L — SIGNIFICANT CHANGE UP (ref 96–108)
CO2 SERPL-SCNC: 27 MMOL/L — SIGNIFICANT CHANGE UP (ref 22–31)
COLOR SPEC: SIGNIFICANT CHANGE UP
CREAT SERPL-MCNC: 1.19 MG/DL — SIGNIFICANT CHANGE UP (ref 0.5–1.3)
DIFF PNL FLD: ABNORMAL
EGFR: 71 ML/MIN/1.73M2 — SIGNIFICANT CHANGE UP
EGFR: 71 ML/MIN/1.73M2 — SIGNIFICANT CHANGE UP
EOSINOPHIL # BLD AUTO: 0.02 K/UL — SIGNIFICANT CHANGE UP (ref 0–0.5)
EOSINOPHIL NFR BLD AUTO: 0.1 % — SIGNIFICANT CHANGE UP (ref 0–6)
EPI CELLS # UR: PRESENT
GLUCOSE SERPL-MCNC: 128 MG/DL — HIGH (ref 70–99)
GLUCOSE UR QL: NEGATIVE MG/DL — SIGNIFICANT CHANGE UP
HCT VFR BLD CALC: 42.7 % — SIGNIFICANT CHANGE UP (ref 39–50)
HGB BLD-MCNC: 14.3 G/DL — SIGNIFICANT CHANGE UP (ref 13–17)
HYALINE CASTS # UR AUTO: PRESENT
IMM GRANULOCYTES NFR BLD AUTO: 0.7 % — SIGNIFICANT CHANGE UP (ref 0–0.9)
INR BLD: 1.19 RATIO — HIGH (ref 0.85–1.16)
KETONES UR-MCNC: NEGATIVE MG/DL — SIGNIFICANT CHANGE UP
LACTATE SERPL-SCNC: 3.5 MMOL/L — HIGH (ref 0.7–2)
LEUKOCYTE ESTERASE UR-ACNC: ABNORMAL
LIDOCAIN IGE QN: 14 U/L — SIGNIFICANT CHANGE UP (ref 13–75)
LYMPHOCYTES # BLD AUTO: 17 % — SIGNIFICANT CHANGE UP (ref 13–44)
LYMPHOCYTES # BLD AUTO: 2.44 K/UL — SIGNIFICANT CHANGE UP (ref 1–3.3)
MCHC RBC-ENTMCNC: 29.5 PG — SIGNIFICANT CHANGE UP (ref 27–34)
MCHC RBC-ENTMCNC: 33.5 G/DL — SIGNIFICANT CHANGE UP (ref 32–36)
MCV RBC AUTO: 88.2 FL — SIGNIFICANT CHANGE UP (ref 80–100)
MONOCYTES # BLD AUTO: 1.74 K/UL — HIGH (ref 0–0.9)
MONOCYTES NFR BLD AUTO: 12.1 % — SIGNIFICANT CHANGE UP (ref 2–14)
NEUTROPHILS # BLD AUTO: 9.99 K/UL — HIGH (ref 1.8–7.4)
NEUTROPHILS NFR BLD AUTO: 69.8 % — SIGNIFICANT CHANGE UP (ref 43–77)
NITRITE UR-MCNC: NEGATIVE — SIGNIFICANT CHANGE UP
NRBC BLD AUTO-RTO: 0 /100 WBCS — SIGNIFICANT CHANGE UP (ref 0–0)
PH UR: 7.5 — SIGNIFICANT CHANGE UP (ref 5–8)
PLATELET # BLD AUTO: 308 K/UL — SIGNIFICANT CHANGE UP (ref 150–400)
POTASSIUM SERPL-MCNC: 4.1 MMOL/L — SIGNIFICANT CHANGE UP (ref 3.5–5.3)
POTASSIUM SERPL-SCNC: 4.1 MMOL/L — SIGNIFICANT CHANGE UP (ref 3.5–5.3)
PROT SERPL-MCNC: 8.3 GM/DL — SIGNIFICANT CHANGE UP (ref 6–8.3)
PROT UR-MCNC: 300 MG/DL
PROTHROM AB SERPL-ACNC: 13.8 SEC — HIGH (ref 9.9–13.4)
RBC # BLD: 4.84 M/UL — SIGNIFICANT CHANGE UP (ref 4.2–5.8)
RBC # FLD: 13.9 % — SIGNIFICANT CHANGE UP (ref 10.3–14.5)
RBC CASTS # UR COMP ASSIST: ABNORMAL /HPF
RH IG SCN BLD-IMP: NEGATIVE — SIGNIFICANT CHANGE UP
SODIUM SERPL-SCNC: 138 MMOL/L — SIGNIFICANT CHANGE UP (ref 135–145)
SP GR SPEC: 1.02 — SIGNIFICANT CHANGE UP (ref 1–1.03)
UROBILINOGEN FLD QL: 0.2 MG/DL — SIGNIFICANT CHANGE UP (ref 0.2–1)
WBC # BLD: 14.33 K/UL — HIGH (ref 3.8–10.5)
WBC # FLD AUTO: 14.33 K/UL — HIGH (ref 3.8–10.5)
WBC UR QL: ABNORMAL /HPF (ref 0–5)

## 2025-05-09 PROCEDURE — 74176 CT ABD & PELVIS W/O CONTRAST: CPT | Mod: 26

## 2025-05-09 PROCEDURE — 99285 EMERGENCY DEPT VISIT HI MDM: CPT

## 2025-05-09 RX ORDER — ACETAMINOPHEN 500 MG/5ML
650 LIQUID (ML) ORAL EVERY 6 HOURS
Refills: 0 | Status: DISCONTINUED | OUTPATIENT
Start: 2025-05-09 | End: 2025-05-13

## 2025-05-09 RX ORDER — MELATONIN 5 MG
3 TABLET ORAL AT BEDTIME
Refills: 0 | Status: DISCONTINUED | OUTPATIENT
Start: 2025-05-09 | End: 2025-05-13

## 2025-05-09 RX ORDER — PIPERACILLIN-TAZO-DEXTROSE,ISO 2.25G/50ML
3.38 IV SOLUTION, PIGGYBACK PREMIX FROZEN(ML) INTRAVENOUS ONCE
Refills: 0 | Status: DISCONTINUED | OUTPATIENT
Start: 2025-05-09 | End: 2025-05-09

## 2025-05-09 RX ORDER — ONDANSETRON HCL/PF 4 MG/2 ML
4 VIAL (ML) INJECTION ONCE
Refills: 0 | Status: COMPLETED | OUTPATIENT
Start: 2025-05-09 | End: 2025-05-09

## 2025-05-09 RX ORDER — SODIUM CHLORIDE 9 G/1000ML
1000 INJECTION, SOLUTION INTRAVENOUS
Refills: 0 | Status: DISCONTINUED | OUTPATIENT
Start: 2025-05-09 | End: 2025-05-13

## 2025-05-09 RX ORDER — CEFTRIAXONE 500 MG/1
1000 INJECTION, POWDER, FOR SOLUTION INTRAMUSCULAR; INTRAVENOUS ONCE
Refills: 0 | Status: COMPLETED | OUTPATIENT
Start: 2025-05-09 | End: 2025-05-09

## 2025-05-09 RX ORDER — PIPERACILLIN-TAZO-DEXTROSE,ISO 2.25G/50ML
3.38 IV SOLUTION, PIGGYBACK PREMIX FROZEN(ML) INTRAVENOUS ONCE
Refills: 0 | Status: COMPLETED | OUTPATIENT
Start: 2025-05-09 | End: 2025-05-10

## 2025-05-09 RX ORDER — HYDROMORPHONE/SOD CHLOR,ISO/PF 2 MG/10 ML
1 SYRINGE (ML) INJECTION ONCE
Refills: 0 | Status: DISCONTINUED | OUTPATIENT
Start: 2025-05-09 | End: 2025-05-09

## 2025-05-09 RX ORDER — ACETAMINOPHEN 500 MG/5ML
1000 LIQUID (ML) ORAL ONCE
Refills: 0 | Status: DISCONTINUED | OUTPATIENT
Start: 2025-05-09 | End: 2025-05-10

## 2025-05-09 RX ORDER — OXYCODONE HYDROCHLORIDE 30 MG/1
2.5 TABLET ORAL ONCE
Refills: 0 | Status: DISCONTINUED | OUTPATIENT
Start: 2025-05-09 | End: 2025-05-09

## 2025-05-09 RX ORDER — ACETAMINOPHEN 500 MG/5ML
1000 LIQUID (ML) ORAL ONCE
Refills: 0 | Status: COMPLETED | OUTPATIENT
Start: 2025-05-09 | End: 2025-05-09

## 2025-05-09 RX ADMIN — Medication 4 MILLIGRAM(S): at 15:39

## 2025-05-09 RX ADMIN — Medication 1000 MILLIGRAM(S): at 14:25

## 2025-05-09 RX ADMIN — CEFTRIAXONE 100 MILLIGRAM(S): 500 INJECTION, POWDER, FOR SOLUTION INTRAMUSCULAR; INTRAVENOUS at 17:25

## 2025-05-09 RX ADMIN — Medication 1 MILLIGRAM(S): at 15:40

## 2025-05-09 RX ADMIN — Medication 1 MILLIGRAM(S): at 20:58

## 2025-05-09 RX ADMIN — Medication 400 MILLIGRAM(S): at 14:10

## 2025-05-09 RX ADMIN — SODIUM CHLORIDE 175 MILLILITER(S): 9 INJECTION, SOLUTION INTRAVENOUS at 20:58

## 2025-05-09 NOTE — ED ADULT TRIAGE NOTE - CHIEF COMPLAINT QUOTE
pt presents to the ED c/o  fever and nausea/ vomiting, LLQ pain, lower back pain and penile pain, dysuria started friday.  has 2 nephrostomy  tubes x 1 year and unable to empty for 2 days.   had a hernia repair 3 weeks ago. hx: colostomy bag. hx: htn, HIV, HLD, depression, anxiety, prostate/ rectal cancer

## 2025-05-09 NOTE — ED ADULT NURSE NOTE - SUICIDE SCREENING DEPRESSION
Status of standing treadmill order:     6/20/2019 Status: Can   Appt Time: 10:45 AM Length: 60   Visit Type: TREADMILL [[id number]] Copay: $0.00        Negative

## 2025-05-09 NOTE — CONSULT NOTE ADULT - SUBJECTIVE AND OBJECTIVE BOX
UROLOGY CONSULT     Called by the ED to see this 58yo male known both to Urology and [facility]. Pt with h/o prostate ca s/p radiation resulting in rectal fistula and placement of SP tube   Chief Complaint:  Patient is a 59y old  Male who presents with a chief complaint of     HPI:      PMH/PSH:PAST MEDICAL & SURGICAL HISTORY:  HTN (hypertension)      HIV infection      Anxiety      History of anal cancer      History of prostate cancer      HLD (hyperlipidemia)      Parastomal hernia with obstruction and without gangrene      S/P colostomy      Nephrostomy present          Allergies:  No Known Allergies      Medications:      REVIEW OF SYSTEMS:  All other review of systems is negative unless indicated above.    Relevant Family History:   FAMILY HISTORY:  FH: prostate cancer    FH: breast cancer        Relevant Social History:  Denies ETOH or tobacco history    Physical Exam:    Vital Signs:  Vital Signs Last 24 Hrs  T(C): 36.8 (09 May 2025 15:31), Max: 36.8 (09 May 2025 15:31)  T(F): 98.2 (09 May 2025 15:31), Max: 98.2 (09 May 2025 15:31)  HR: 87 (09 May 2025 15:31) (87 - 92)  BP: 145/100 (09 May 2025 15:31) (145/100 - 149/89)  BP(mean): --  RR: 18 (09 May 2025 15:31) (18 - 18)  SpO2: 99% (09 May 2025 15:31) (99% - 99%)    Parameters below as of 09 May 2025 12:52  Patient On (Oxygen Delivery Method): room air      Daily Height in cm: 180.34 (09 May 2025 12:52)    Daily     Constitutional: WDWN resting comfortably in bed; NAD  HEENT: NC/AT, PERRL, EOMI, anicteric sclera, no nasal discharge; uvula midline, no oropharyngeal erythema or exudates  Neck: supple; no JVD or thyromegaly  Respiratory: CTA B/L; no W/R/R, no retractions  Cardiac: +S1/S2; RRR; no M/R/G; PMI non-displaced  Gastrointestinal: soft, NT/ND; no rebound or guarding; +BS   Extremities: , no clubbing or cyanosis; no peripheral edema  Musculoskeletal:  no joint swelling, tenderness or erythema  Vascular: 2+ radial, femoral, DP/PT pulses B/L  Skin: warm, dry and intact; no rashes, wounds, or scars  Neurologic: AAOx3; CNS grossly intact; no focal deficits no asterixis, no tremor, no encephalopathy    Laboratory:                          14.3   14.33 )-----------( 308      ( 09 May 2025 14:02 )             42.7     05-09    138  |  105  |  19  ----------------------------<  128[H]  4.1   |  27  |  1.19    Ca    10.0      09 May 2025 14:02    TPro  8.3  /  Alb  3.6  /  TBili  0.8  /  DBili  x   /  AST  17  /  ALT  16  /  AlkPhos  106  05-09    LIVER FUNCTIONS - ( 09 May 2025 14:02 )  Alb: 3.6 g/dL / Pro: 8.3 gm/dL / ALK PHOS: 106 U/L / ALT: 16 U/L / AST: 17 U/L / GGT: x             Urinalysis Basic - ( 09 May 2025 14:02 )    Color: x / Appearance: x / SG: x / pH: x  Gluc: 128 mg/dL / Ketone: x  / Bili: x / Urobili: x   Blood: x / Protein: x / Nitrite: x   Leuk Esterase: x / RBC: x / WBC x   Sq Epi: x / Non Sq Epi: x / Bacteria: x      Amylase Serum--      Lipase serum14 U/L       Ammonia--      Intake and Output      Imaging:     UROLOGY CONSULT     Called by the ED to see this 60yo male known both to Urology and [facility]. 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 Now with bilateral hydronephrosis and left sided pain. Last exchange 1 month ago.  Severe structure noted in distal ureters during conversion from PCN to PNCU.   Pt denies fever or chills but does c/o LEFT back pain       PMH/PSH:PAST MEDICAL & SURGICAL HISTORY:  HTN (hypertension), HIV infection, Anxiety, History of anal cancer, History of prostate cancer, HLD (hyperlipidemia), Parastomal hernia with obstruction and without gangrene  S/P colostomy,  Nephrostomy present    Allergies: No Known Allergies      Medications:               cyclobenzaprine 10 mg oral tablet: 1 tab(s) orally every 8 hours MDD: 3  · 	gabapentin 300 mg oral capsule: 1 cap(s) orally every 8 hours MDD: 3  · 	Dilaudid 4 mg oral tablet: 1 tab(s) orally every 6 hours as needed for  severe pain MDD: 4  · 	bictegravir/emtricitabine/tenofovir 50 mg-200 mg-25 mg oral tablet: 1 tab(s) orally once a day  · 	traZODone 50 mg oral tablet: 1 tab(s) orally once a day (at bedtime)  · 	clonazePAM 2 mg oral tablet: 1 tab(s) orally 2 times a day  · 	buPROPion 150 mg/24 hours (XL) oral tablet, extended release: 2 tab(s) orally once a day  · 	metoprolol succinate 100 mg oral capsule, extended release: 1 cap(s) orally once a day am  · 	Medical Marjuana 4- 5 times as needed per day ( smoking) : as needed  · 	Vitamin D2 1.25 mg once a week on sundays:   · 	metoprolol succinate 100 mg oral capsule, extended release: 1 cap(s) orally once a day am  · 	vitamin D 2, 1.25 mg, PO, Weekly, Sunday:   · 	traZODone 50 mg oral tablet: 1 tab(s) orally once a day (at bedtime)  · 	zolpidem 10 mg oral tablet: 1 tab(s) orally once a day (at bedtime)  · 	buPROPion 150 mg/12 hours (SR) oral tablet, extended release: 2 tab(s) orally once a day am  · 	ezetimibe 10 mg oral tablet: 1 tab(s) orally once a day (at bedtime)  · 	Tiadylt  mg/24 hours oral capsule, extended release: 1 cap(s) orally once a day am        REVIEW OF SYSTEMS: All other review of systems is negative unless indicated above.    Relevant Family History:   FAMILY HISTORY:  FH: prostate cancer    FH: breast cancer      Relevant Social History:  Denies ETOH or tobacco history    Physical Exam:    Vital Signs:  Vital Signs Last 24 Hrs  T(C): 36.8 (09 May 2025 15:31), Max: 36.8 (09 May 2025 15:31)  T(F): 98.2 (09 May 2025 15:31), Max: 98.2 (09 May 2025 15:31)  HR: 87 (09 May 2025 15:31) (87 - 92)  BP: 145/100 (09 May 2025 15:31) (145/100 - 149/89)  BP(mean): --  RR: 18 (09 May 2025 15:31) (18 - 18)  SpO2: 99% (09 May 2025 15:31) (99% - 99%)    Parameters below as of 09 May 2025 12:52  Patient On (Oxygen Delivery Method): room air      Daily Height in cm: 180.34 (09 May 2025 12:52)    Daily     Constitutional: WDWN resting comfortably in bed; NAD  HEENT: NC/AT, PERRL, EOMI, anicteric sclera, no nasal discharge; uvula midline, no oropharyngeal erythema or exudates  Neck: supple; no JVD or thyromegaly  Respiratory: CTA B/L; no W/R/R, no retractions  Cardiac: +S1/S2; RRR; no M/R/G; PMI non-displaced  Gastrointestinal: Ostomy pink, soft  NT/ND;      Mc PCN in place with no output either side- Notable left flank tenderness to palpation   Extremities: , no clubbing or cyanosis; no peripheral edema  Musculoskeletal:  no joint swelling, tenderness or erythema  Vascular: 2+ radial, femoral, DP/PT pulses B/L  Skin: warm, dry and intact; no rashes, wounds, or scars  Neurologic: AAOx3; CNS grossly intact; no focal deficits no asterixis, no tremor, no encephalopathy    Laboratory:                          14.3   14.33 )-----------( 308      ( 09 May 2025 14:02 )             42.7     05-09    138  |  105  |  19  ----------------------------<  128[H]  4.1   |  27  |  1.19    Ca    10.0      09 May 2025 14:02    TPro  8.3  /  Alb  3.6  /  TBili  0.8  /  DBili  x   /  AST  17  /  ALT  16  /  AlkPhos  106  05-09    LIVER FUNCTIONS - ( 09 May 2025 14:02 )  Alb: 3.6 g/dL / Pro: 8.3 gm/dL / ALK PHOS: 106 U/L / ALT: 16 U/L / AST: 17 U/L / GGT: x             Urinalysis Basic - ( 09 May 2025 14:02 )    Color: x / Appearance: x / SG: x / pH: x  Gluc: 128 mg/dL / Ketone: x  / Bili: x / Urobili: x   Blood: x / Protein: x / Nitrite: x   Leuk Esterase: x / RBC: x / WBC x   Sq Epi: x / Non Sq Epi: x / Bacteria: x      Amylase Serum--      Lipase serum14 U/L       < from: CT Abdomen and Pelvis No Cont (05.09.25 @ 15:51) >  ACC: [id number] EXAM:  CT ABDOMEN AND PELVIS   ORDERED BY: AYALA BARRAGAN     PROCEDURE DATE:  05/09/2025          INTERPRETATION:  CLINICAL INFORMATION: Bilateral nephrostomy tubes not   draining. Low-grade fever.    COMPARISON: CT abdomen pelvis 3/11/2025    CONTRAST/COMPLICATIONS:  IV Contrast: NONE  Oral Contrast: NONE  .    PROCEDURE:  CT of the Abdomen and Pelvis was performed.  Sagittal and coronal reformats were performed.    FINDINGS:  LOWER CHEST: Within normal limits.    LIVER: Within normal limits.  BILE DUCTS: Normal caliber.  GALLBLADDER: Within normal limits.  SPLEEN: Within normal limits.  PANCREAS: Within normal limits.  ADRENALS: Within normal limits.  KIDNEYS/URETERS: Bilateral percutaneous nephroureteral catheters   terminating in the bladder. Moderate to severe bilateral hydronephrosis,   new on the right. Bilateral renal cysts.    BLADDER: Within normal limits.  REPRODUCTIVE ORGANS: Prostate within normal limits. Bilateral hydroceles.    BOWEL: No bowel obstruction. Appendix is normal. Status post APR with   lipomatous flap in the pelvic floor and left lower quadrant colostomy.  PERITONEUM/RETROPERITONEUM: Within normal limits.  VESSELS: Atherosclerotic calcification. IVC filter.  LYMPH NODES: No lymphadenopathy.  ABDOMINAL WALL: Postsurgical changes. Status post repair of parastomal   hernia with mesh.  BONES: Within normal limits.    IMPRESSION:  Moderate to severe bilateral hydronephrosis, new on the right. Bilateral   percutaneous nephroureteral catheters inplace.        --- End of Report ---            RAMÓN VOGEL MD  This document has been electronically signed. May  9 2025  4:16PM    < end of copied text >

## 2025-05-09 NOTE — ED PROVIDER NOTE - OBJECTIVE STATEMENT
The patient is a 59y Male who has a past medical and surgery history of HTN HLD HIV Anxiety Anal Cancer/prostate cancer colostomy c/b Parastomal hernia obstruction Bilateral Nephrostomy tubes PTED as a transfer from [facility] for IR ( ) for [place] on CT and no drainage for 48Hours

## 2025-05-09 NOTE — ED PROVIDER NOTE - PROGRESS NOTE DETAILS
Patient signed out by Dr. Valladares, CT w/ b/l moderate-severe hydro. Discussed with IR at [facility], will transfer for consult and likely replacement of nephrostomy tubes. UA+, will dose ceftriaxone. Yeison Huitron DO

## 2025-05-09 NOTE — ED PROVIDER NOTE - GENITOURINARY, MLM
Nephrostomy tube inspected with IR debris seen in tubes flushed with saline w/o incidence some resistance noted

## 2025-05-09 NOTE — ED ADULT NURSE REASSESSMENT NOTE - NS ED NURSE REASSESS COMMENT FT1
second IV placed 20 gauge left hand, transport at the bedside taking pt upstairs. RN Huy made aware.

## 2025-05-09 NOTE — ED ADULT TRIAGE NOTE - AS HEIGHT TYPE
Patient: Josy Matthews Date: 2020   : 1933 Attending: Juan A Batista MD   87 year old female      GI reconsult note     Re-Consult Diagnosis:  1. Profound anemia     Subjective:    Resting in bed, denies abdominal pain, nausea, or vomiting.   Had 2 stools yesterday, no melena or hematochezia.    Reconsult HPI: This is a 87 year old female who we signed off care back on  now asked to see again for profound anemia but no further GIB. Hgb today is down to 6.8 & yesterday was 7.6.  She notes vomiting yesterday but no hematemesis. No fevers/chills. No abdominal pain. Stools green in color overnight. She will get a unit of cell mass given today. She prefers to defer endoscopies at this time.     HPI: This is a 87 year old female known to us with a history of CAD s/p stents most recent NOEMI to RCA on 10/10/2019 on ASA/Plavix, CVA, diastolic heart failure, moderate pulmonary hypertension, chronic anemia, stage IV CKD, hx of breast cancer s/p partial mastectomy, GAVE s/p EGD with APC in 2018 and recurrent right pleural effusion s/p right chest pleurx drain placement in 10/2017. She was just recently diagnosed with pneumonia back on 2020 on abx therapy. Now admitted for persistent productive cough x 5 days with possible hemoptysis and we're asked to see for hematemesis x 1 this am. She notes nausea today. Notes chills but no fevers. She is SOB & has chest pain. She had heartburn on Tuesday but no dysphagia. On protonix & carafate at home. No abdominal pain. Appetite is poor with ?weight loss. Last BM yesterday was dark brown.     Last colonoscopy on 18 with polyps-tubular and tubulovillous adenoma, no overt GI bleeding.   Last EGD on 2018 revealed:   1. Small hiatal hernia  2. Multiple bleeding angiodysplasias in body and fundus of stomach  3. Few small polyps in body and fundus of stomach  4. Moderate gastritis vs angiodysplasias in antrum ( watermelon stomach ) s/p argon plasma  coagulation      PMH/FH/SH/allergies/meds reviewed/unchanged.      Scheduled Inpatient Meds  • ipratropium-albuterol  3 mL Nebulization TID Resp   • predniSONE  40 mg Oral Daily with breakfast   • aspirin  81 mg Oral Daily   • clopidogrel  75 mg Oral Daily   • pantoprazole  40 mg Oral 2 times per day   • melatonin  3 mg Oral Nightly   • guaiFENesin  1,200 mg Oral 2 times per day   • guaiFENesin-DM)  10 mL Oral BID   • benzonatate  100 mg Oral QHS   • ferrous sulfate  325 mg Oral Daily at noon   • ALPRAZolam  0.25 mg Oral Daily   • ALPRAZolam  0.5 mg Oral QHS   • atorvastatin  80 mg Oral Nightly   • calcium carbonate-vitamin D  1 tablet Oral BID   • cloNIDine  0.1 mg Oral 2 times per day   • fluticasone  2 spray Each Nare Daily   • fluticasone-vilanterol  1 puff Inhalation Daily   • folic acid  1 mg Oral Daily   • [Held by provider] furosemide  20 mg Oral BID   • gabapentin  100 mg Oral Daily   • gabapentin  400 mg Oral QHS   • hydrALAZINE  100 mg Oral 3 times per day   • isosorbide mononitrate  60 mg Oral Daily   • latanoprost  1 drop Both Eyes Nightly   • lidocaine  2 patch Transdermal Nightly   • metoPROLOL tartrate  25 mg Oral 2 times per day   • vitamin - therapeutic multivitamins w/minerals  1 tablet Oral Daily   • NIFEdipine XL  90 mg Oral Daily   • sertraline  200 mg Oral Daily   • polyethylene glycol  17 g Oral Daily   • senna  1 tablet Oral Nightly   • sodium chloride (PF)  2 mL Intracatheter 2 times per day     Medications Prior to Admission   Medication Sig Dispense Refill   • ALPRAZolam (XANAX) 0.5 MG tablet Take 0.5 mg by mouth daily.     • ALPRAZolam (XANAX) 0.25 MG tablet Take 0.25 mg by mouth nightly as needed for Anxiety.     • furosemide (LASIX) 20 MG tablet Take 20 mg by mouth 2 times daily.     • levoFLOXacin (LEVAQUIN) 500 MG tablet Take 500 mg by mouth daily.     • Probiotic Product (PROBIOTIC DAILY PO) Take 1 capsule by mouth every morning.     • Dextromethorphan-guaiFENesin (MUCINEX DM)   MG TABLET SR 12 HR      • ipratropium-albuterol (DUONEB) 0.5-2.5 (3) MG/3ML nebulizer solution Take 3 mLs by nebulization every 6 hours as needed for Wheezing or Shortness of Breath (for cough.).     • magnesium hydroxide (MILK OF MAGNESIA) 400 MG/5ML suspension Take 30 mLs by mouth daily as needed for Constipation.     • docusate sodium-sennosides (SENOKOT S) 50-8.6 MG per tablet Take 1 tablet by mouth nightly.     • lidocaine (LIDOCARE) 4 % patch Place 2 patches onto the skin daily. 30 patch 0   • oxyCODONE-acetaminophen (PERCOCET) 5-325 MG per tablet Take 1 tablet by mouth every 8 hours as needed for Pain (chronic bilateral shoulder pain). 14 tablet 0   • hydrALAZINE (APRESOLINE) 100 MG tablet Take 1 tablet by mouth 3 times daily. 90 tablet 0   • fluticasone-vilanterol (BREO ELLIPTA) 100-25 MCG/INH inhaler Inhale 1 puff into the lungs daily.     • ferrous sulfate 325 (65 FE) MG tablet Take 1 tablet by mouth daily (with breakfast). Do not start before December 6, 2019. 90 tablet 3   • senna (SENOKOT) 8.6 MG tablet Take 1 tablet by mouth daily. Do not start before December 2, 2019.     • atorvastatin (LIPITOR) 80 MG tablet Take 1 tablet by mouth nightly. 30 tablet 0   • aspirin 81 MG chewable tablet Chew 1 tablet by mouth daily. Do not start before October 19, 2019. 30 tablet 0   • cloNIDine (CATAPRES) 0.1 MG tablet Take 1 tablet by mouth every 12 hours. 60 tablet 0   • clopidogrel (PLAVIX) 75 MG tablet Take 1 tablet by mouth daily. Do not start before October 19, 2019. 30 tablet 0   • gabapentin (NEURONTIN) 100 MG capsule Take 100 mg by mouth every morning.     • bisacodyl (DULCOLAX) 10 MG suppository Place 10 mg rectally daily as needed for Constipation.     • metoPROLOL tartrate (LOPRESSOR) 25 MG tablet Take 25 mg by mouth every 12 hours.     • magnesium lactate (MAG-TAB SR) 84 MG (7MEQ) Tab CR Take 1 tablet by mouth daily (with breakfast). 90 tablet 3   • NIFEdipine XL (PROCARDIA XL) 90 MG 24 hr tablet  Take 1 tablet by mouth daily. 90 tablet 11   • nitroGLYcerin (NITROSTAT) 0.4 MG sublingual tablet Place 1 tablet under the tongue every 5 minutes as needed for Chest pain. 90 tablet 12   • fluticasone (FLONASE) 50 MCG/ACT nasal spray Spray 2 sprays in each nostril 2 times daily.     • gabapentin (NEURONTIN) 400 MG capsule Take 400 mg by mouth at bedtime.     • bisacodyl (DULCOLAX) 5 MG EC tablet Take 5 mg by mouth daily as needed for Constipation.     • isosorbide mononitrate (IMDUR) 60 MG 24 hr tablet Take 1 tablet by mouth daily. 30 tablet 0   • sertraline (ZOLOFT) 100 MG tablet Take 2 tablets by mouth daily. 60 tablet 0   • sucralfate (CARAFATE) 1 GM/10ML suspension Take 10 mLs by mouth every 6 hours. 420 mL 0   • Multiple Vitamins-Minerals (THEREMS-M) Tab Take 1 tablet by mouth daily.     • acetaminophen (TYLENOL) 325 MG tablet Take 650 mg by mouth every 4 hours as needed for Pain or Fever. Dose: 2 tabs (= 650 mg)  Do not exceed 3,000 mg of Acetaminophen in 24 hours     • latanoprost (XALATAN) 0.005 % ophthalmic solution Place 1 drop into both eyes nightly.      • folic acid (FOLATE) 1 MG tablet Take 800 mcg by mouth daily. Dose: 2 tablets (=800 mg)      • pantoprazole (PROTONIX) 40 MG tablet Take 40 mg by mouth nightly.      • albuterol 108 (90 BASE) MCG/ACT inhaler Inhale 2 puffs into the lungs Every 4 hours as needed for Shortness of Breath or Wheezing. 1 Inhaler 3   • calcium carbonate-vitamin D (CALTRATE+D) 600-400 MG-UNIT per tablet Take 1 tablet by mouth 2 times daily.     • furosemide (LASIX) 40 MG tablet Take 40 mg by mouth daily. For 2 days.           Vital 24 Hour Range Last Value   Temperature Temp  Min: 97.4 °F (36.3 °C)  Max: 99 °F (37.2 °C) 99 °F (37.2 °C) (01/31/20 0520)   Pulse Pulse  Min: 63  Max: 72 71 (01/31/20 0520)   Respiratory Resp  Min: 14  Max: 18 18 (01/31/20 0520)   Non-Invasive  Blood Pressure BP  Min: 124/76  Max: 164/48 138/80 (01/31/20 0520)   Arterial   Blood Pressure No data  recorded       Vital First Value Last Value   Weight Weight: 69.9 kg (01/23/20 0945) 68.9 kg (01/31/20 0520)   Height N/A 5' 4\" (162.6 cm) (01/30/20 0814)   BMI N/A 26.07 (01/31/20 0520)      Intake/Output:  Last stool occurrence: 1 (01/30/20 1729)    No intake/output data recorded.    I/O last 3 completed shifts:  In: 406 [Blood:406]  Out: -       Intake/Output Summary (Last 24 hours) at 1/31/2020 0813  Last data filed at 1/30/2020 1545  Gross per 24 hour   Intake 406 ml   Output --   Net 406 ml       Review of Systems:  General-  Denies fevers/chills  HEENT-  Denies headache  Respiratory- DeniesSOB  Cardiac- Denies CP  GI- as per HPI      Physical Exam:   General appearance: awake, alert, NAD.  Lungs: Decreased BS bilaterally   Heart: regular rate and rhythm, S1, S2 normal, no murmur  Abdomen: Soft, non-tender, no guarding, quiet BS, non-distended, no masses or organomegaly      Labs:  Recent Labs   Lab 01/31/20  0431 01/30/20  1653 01/30/20  0730 01/30/20  0439  01/29/20  0450   WBC 10.3  --   --  9.0  --  9.6   HCT 24.3* 26.2* 21.3* 20.8*   < > 21.1*   HGB 8.1* 8.7* 6.8* 6.7*   < > 6.8*   MCV 90.3  --   --  93.3  --  93.8     --   --  289  --  291   SODIUM 137  --   --  133*  --  132*   POTASSIUM 4.4  --   --  4.3  --  4.2   GLUCOSE 100*  --   --  98  --  94   BUN 87*  --   --  79*  --  72*   CREATININE 2.85*  --   --  3.11*  --  2.97*    < > = values in this interval not displayed.       Radiology Findings: N/A     Pathology Findings: N/A     Assessment:  1. 87 year old female known to us with extensive PMH with recent persistent cough/probable pneumonia diagnosed on 1/17 and having apparent hematemesis or hemoptysis but stable H&H with no further GIB therefore we signed off of care back on 1/27.  Now we're asked to see again for profound normocytic anemia with Hgb down to 6.8 from 7.6 yesterday. No further GIB.    2. Chronic normocytic anemia--last colonoscopy in 2/2018    Recent iron studies revealed  high Ferritin level, normal iron level and iron sat, and low TIBC at 159   Current Hgb 8.1     3. Hx of GAVE s/p EGD with APC in 2/2018     4. Hx of NSTEMI s/p PCI x1 NOEMI to RCA on 10/10/19. DAPT with ASA/ Plavix.     5. Recurrent large right sided pleural effusion 2/2 diastolic heart failure and pulmonary HTN. S/p Pleurx catheter placement in 10/2019.     6. Hx of CVA, diastolic heart failure, moderate pulmonary hypertension, chronic anemia, stage IV CKD, hx of breast cancer s/p partial mastectomy     Plans/Recommendations:  -- Monitor H&H and stools. Transfuse prn.  --Could consider FOBT if Hgb continues to drop   --Continue protonix 40mg BID  -- No need for endoscopic interventions at this time as there are no signs of overt GI bleed. Will reconsider if there are signs of GI bleed.   --Diet as tolerated   --Antiemetics prn   --Will follow     Thank you,  Deana Fan, DNP with Dr. Dewey     GI Staff    Patient seen and examined. PMHx/SHx/SocHx/FHx/Meds/Allergies reviewed. Agree with above assessment and participated in the development of the treatment plan.      Hgb remains stable. Diet as tolerated    Enrique CARTAGENA                 stated

## 2025-05-09 NOTE — ED ADULT NURSE REASSESSMENT NOTE - NS ED NURSE REASSESS COMMENT FT1
patient laying in semi fowlers position on the stretcher. patient alert and oriented times four. patient denies shortness of breath, chest pain, nausea, vomiting, chill, fever. Patient normal sinus on the monitor. Respirations equal and adequate. Patients IV patent, no signs of infiltration. Safety measures in place, call bell within reach. Patient stable upon assessment.

## 2025-05-09 NOTE — CHART NOTE - NSCHARTNOTEFT_GEN_A_CORE
Patient known to me from prior exchanges and hospitalizations at [facility].    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 Now with bilateral hydronephrosis and left sided pain. Last exchange 1 month ago.     Severe structure noted in distal ureters during conversion from PCN to PNCU. Will assess if patient would require sedation for exchange.     Please keep NPO.   Consider initiation of antibiotics as wbc is elevated.

## 2025-05-09 NOTE — ED ADULT NURSE NOTE - HOW PATIENT ADDRESSED, PROFILE
Spoke with pt and her daughter in law regarding message below per Md. Letter was read to pt and will be faxed to number provided per pt.     Alta Royalty  [phone]   David

## 2025-05-09 NOTE — ED ADULT NURSE NOTE - CHIEF COMPLAINT QUOTE
Pt presents as transfer from [facility]. Pt has bilateral nephrostomy tubes, currently not draining properly sent for IR consult. Pt treated for UTI at [place], given Rocephin.

## 2025-05-09 NOTE — CONSULT NOTE ADULT - ASSESSMENT
59M with h/o recent conversion of prior bilateral ureteral stents to bilat PCN by IR .   [facility] of no output from PCN and flank pain    CT with severe bilat hydroureteronephrosis new on rt     DX bilat obstructed PCN  NO IR available at [facility]- transfer to [facility] for PCN exchange     DANTE Mack

## 2025-05-09 NOTE — ED ADULT NURSE NOTE - OBJECTIVE STATEMENT
Pt presents to the ED c/o  fever n/v, LLQ pain, lower back pain and penile pain, dysuria starting Friday. Pt has 2 nephrostomy  tubes x 1 year that have been unable to drain x 2 days as per wife.  Pt had a hernia repair 3 weeks ago. PMH of colostomy bag,  HTN, HIV, HLD, depression, anxiety, prostate/ rectal cancer. Pt is awake and alert, a&ox4. Wife at bedside.

## 2025-05-09 NOTE — ED ADULT NURSE NOTE - OBJECTIVE STATEMENT
pt received to [facility] and ambulatory at baseline, sent from [facility] for IR consult. pt with bilateral nephrostomy tubes, states decreased drainage and pain, more pain to L side. pt also endorses "thick and white" urine from tubes. IR at bedside and flushed tubes with pain to patient. received rocephin PTA for UTI. 20G IV in R hand, labs sent. no acute distress noted at this time. respirations even and nonlabored on room air. comfort and safety maintained.

## 2025-05-09 NOTE — ED ADULT NURSE REASSESSMENT NOTE - NS ED NURSE REASSESS COMMENT FT1
report given to HA Solares. patient laying in semi fowlers position on the stretcher. patient alert and oriented times four. patient denies shortness of breath, chest pain, nausea, vomiting, chill, fever. Respirations equal and adequate. Patients IV patent, no signs of infiltration. Safety measures in place, call bell within reach. Patient stable upon leaving the room.

## 2025-05-09 NOTE — ED ADULT TRIAGE NOTE - CHIEF COMPLAINT QUOTE
Pt presents as transfer from [facility] for urology. Pt has bilateral nephrostomy tubes, currently not draining properly. Pt treated for UTI at [place], given Rocephin. Pt presents as transfer from [facility]. Pt has bilateral nephrostomy tubes, currently not draining properly sent for IR consult. Pt treated for UTI at [place], given Rocephin.

## 2025-05-09 NOTE — ED PROVIDER NOTE - CLINICAL SUMMARY MEDICAL DECISION MAKING FREE TEXT BOX
59-year-old male with history of rectal cancer, asymptomatic HIV infection, syphilis, hypertension, anxiety, kidney stones, and prostate cancer, bilateral nephrostomy tubes and colostomy bag presents today accompanied with his wife for evaluation.  Patient reports that for the last 2 days he has had no output from both urostomy tubes and experiencing pain to the left side.  Per wife she is unable to flush the area, last night had low-grade temp of 100.1 associated with nausea and vomiting, patient also complaining of penile pain and burning.  He denies chest pain, shortness of breath, palpitations, above differential diagnosis includes not limited to hydro, UTI,.  Plan-, pain control, urology consult .  Will reassess and dispo     Labs reviewed,  elevated wbc-14, lactate 3.5  urology consulted, recommend ct, plan for transfer if blockage for IR

## 2025-05-10 DIAGNOSIS — B20 HUMAN IMMUNODEFICIENCY VIRUS [HIV] DISEASE: ICD-10-CM

## 2025-05-10 DIAGNOSIS — N13.30 UNSPECIFIED HYDRONEPHROSIS: ICD-10-CM

## 2025-05-10 DIAGNOSIS — C61 MALIGNANT NEOPLASM OF PROSTATE: ICD-10-CM

## 2025-05-10 DIAGNOSIS — C21.1 MALIGNANT NEOPLASM OF ANAL CANAL: ICD-10-CM

## 2025-05-10 DIAGNOSIS — R94.31 ABNORMAL ELECTROCARDIOGRAM [ECG] [EKG]: ICD-10-CM

## 2025-05-10 DIAGNOSIS — A41.9 SEPSIS, UNSPECIFIED ORGANISM: ICD-10-CM

## 2025-05-10 DIAGNOSIS — Z98.890 OTHER SPECIFIED POSTPROCEDURAL STATES: ICD-10-CM

## 2025-05-10 DIAGNOSIS — Z29.9 ENCOUNTER FOR PROPHYLACTIC MEASURES, UNSPECIFIED: ICD-10-CM

## 2025-05-10 DIAGNOSIS — F41.9 ANXIETY DISORDER, UNSPECIFIED: ICD-10-CM

## 2025-05-10 DIAGNOSIS — I10 ESSENTIAL (PRIMARY) HYPERTENSION: ICD-10-CM

## 2025-05-10 DIAGNOSIS — E78.5 HYPERLIPIDEMIA, UNSPECIFIED: ICD-10-CM

## 2025-05-10 LAB
ALBUMIN SERPL ELPH-MCNC: 3.5 G/DL — SIGNIFICANT CHANGE UP (ref 3.3–5)
ALP SERPL-CCNC: 89 U/L — SIGNIFICANT CHANGE UP (ref 40–120)
ALT FLD-CCNC: 7 U/L — SIGNIFICANT CHANGE UP (ref 4–41)
ANION GAP SERPL CALC-SCNC: 12 MMOL/L — SIGNIFICANT CHANGE UP (ref 7–14)
APPEARANCE UR: ABNORMAL
APTT BLD: 28.8 SEC — SIGNIFICANT CHANGE UP (ref 26.1–36.8)
AST SERPL-CCNC: 12 U/L — SIGNIFICANT CHANGE UP (ref 4–40)
BACTERIA # UR AUTO: ABNORMAL /HPF
BASOPHILS # BLD AUTO: 0.04 K/UL — SIGNIFICANT CHANGE UP (ref 0–0.2)
BASOPHILS # BLD AUTO: 0.04 K/UL — SIGNIFICANT CHANGE UP (ref 0–0.2)
BASOPHILS NFR BLD AUTO: 0.4 % — SIGNIFICANT CHANGE UP (ref 0–2)
BASOPHILS NFR BLD AUTO: 0.5 % — SIGNIFICANT CHANGE UP (ref 0–2)
BILIRUB SERPL-MCNC: 0.3 MG/DL — SIGNIFICANT CHANGE UP (ref 0.2–1.2)
BILIRUB UR-MCNC: NEGATIVE — SIGNIFICANT CHANGE UP
BUN SERPL-MCNC: 15 MG/DL — SIGNIFICANT CHANGE UP (ref 7–23)
CALCIUM SERPL-MCNC: 9 MG/DL — SIGNIFICANT CHANGE UP (ref 8.4–10.5)
CAST: 8 /LPF — HIGH (ref 0–4)
CHLORIDE SERPL-SCNC: 101 MMOL/L — SIGNIFICANT CHANGE UP (ref 98–107)
CO2 SERPL-SCNC: 23 MMOL/L — SIGNIFICANT CHANGE UP (ref 22–31)
COLOR SPEC: YELLOW — SIGNIFICANT CHANGE UP
CREAT SERPL-MCNC: 0.85 MG/DL — SIGNIFICANT CHANGE UP (ref 0.5–1.3)
CULTURE RESULTS: SIGNIFICANT CHANGE UP
DIFF PNL FLD: ABNORMAL
EGFR: 101 ML/MIN/1.73M2 — SIGNIFICANT CHANGE UP
EGFR: 101 ML/MIN/1.73M2 — SIGNIFICANT CHANGE UP
EOSINOPHIL # BLD AUTO: 0.07 K/UL — SIGNIFICANT CHANGE UP (ref 0–0.5)
EOSINOPHIL # BLD AUTO: 0.1 K/UL — SIGNIFICANT CHANGE UP (ref 0–0.5)
EOSINOPHIL NFR BLD AUTO: 0.8 % — SIGNIFICANT CHANGE UP (ref 0–6)
EOSINOPHIL NFR BLD AUTO: 1.2 % — SIGNIFICANT CHANGE UP (ref 0–6)
GLUCOSE SERPL-MCNC: 128 MG/DL — HIGH (ref 70–99)
GLUCOSE UR QL: NEGATIVE MG/DL — SIGNIFICANT CHANGE UP
HCT VFR BLD CALC: 35.5 % — LOW (ref 39–50)
HCT VFR BLD CALC: 40.2 % — SIGNIFICANT CHANGE UP (ref 39–50)
HGB BLD-MCNC: 12.1 G/DL — LOW (ref 13–17)
HGB BLD-MCNC: 13.1 G/DL — SIGNIFICANT CHANGE UP (ref 13–17)
IANC: 4.43 K/UL — SIGNIFICANT CHANGE UP (ref 1.8–7.4)
IANC: 4.64 K/UL — SIGNIFICANT CHANGE UP (ref 1.8–7.4)
IMM GRANULOCYTES NFR BLD AUTO: 0.3 % — SIGNIFICANT CHANGE UP (ref 0–0.9)
IMM GRANULOCYTES NFR BLD AUTO: 0.6 % — SIGNIFICANT CHANGE UP (ref 0–0.9)
INR BLD: 1.18 RATIO — HIGH (ref 0.85–1.16)
KETONES UR-MCNC: ABNORMAL MG/DL
LEUKOCYTE ESTERASE UR-ACNC: ABNORMAL
LYMPHOCYTES # BLD AUTO: 2.3 K/UL — SIGNIFICANT CHANGE UP (ref 1–3.3)
LYMPHOCYTES # BLD AUTO: 2.72 K/UL — SIGNIFICANT CHANGE UP (ref 1–3.3)
LYMPHOCYTES # BLD AUTO: 28.5 % — SIGNIFICANT CHANGE UP (ref 13–44)
LYMPHOCYTES # BLD AUTO: 30.6 % — SIGNIFICANT CHANGE UP (ref 13–44)
MAGNESIUM SERPL-MCNC: 2 MG/DL — SIGNIFICANT CHANGE UP (ref 1.6–2.6)
MCHC RBC-ENTMCNC: 29.4 PG — SIGNIFICANT CHANGE UP (ref 27–34)
MCHC RBC-ENTMCNC: 29.8 PG — SIGNIFICANT CHANGE UP (ref 27–34)
MCHC RBC-ENTMCNC: 32.6 G/DL — SIGNIFICANT CHANGE UP (ref 32–36)
MCHC RBC-ENTMCNC: 34.1 G/DL — SIGNIFICANT CHANGE UP (ref 32–36)
MCV RBC AUTO: 87.4 FL — SIGNIFICANT CHANGE UP (ref 80–100)
MCV RBC AUTO: 90.1 FL — SIGNIFICANT CHANGE UP (ref 80–100)
MONOCYTES # BLD AUTO: 1.15 K/UL — HIGH (ref 0–0.9)
MONOCYTES # BLD AUTO: 1.39 K/UL — HIGH (ref 0–0.9)
MONOCYTES NFR BLD AUTO: 14.3 % — HIGH (ref 2–14)
MONOCYTES NFR BLD AUTO: 15.6 % — HIGH (ref 2–14)
NEUTROPHILS # BLD AUTO: 4.43 K/UL — SIGNIFICANT CHANGE UP (ref 1.8–7.4)
NEUTROPHILS # BLD AUTO: 4.64 K/UL — SIGNIFICANT CHANGE UP (ref 1.8–7.4)
NEUTROPHILS NFR BLD AUTO: 52.3 % — SIGNIFICANT CHANGE UP (ref 43–77)
NEUTROPHILS NFR BLD AUTO: 54.9 % — SIGNIFICANT CHANGE UP (ref 43–77)
NITRITE UR-MCNC: NEGATIVE — SIGNIFICANT CHANGE UP
NRBC # BLD AUTO: 0 K/UL — SIGNIFICANT CHANGE UP (ref 0–0)
NRBC # BLD AUTO: 0 K/UL — SIGNIFICANT CHANGE UP (ref 0–0)
NRBC # FLD: 0 K/UL — SIGNIFICANT CHANGE UP (ref 0–0)
NRBC # FLD: 0 K/UL — SIGNIFICANT CHANGE UP (ref 0–0)
NRBC BLD AUTO-RTO: 0 /100 WBCS — SIGNIFICANT CHANGE UP (ref 0–0)
NRBC BLD AUTO-RTO: 0 /100 WBCS — SIGNIFICANT CHANGE UP (ref 0–0)
PH UR: 7 — SIGNIFICANT CHANGE UP (ref 5–8)
PHOSPHATE SERPL-MCNC: 2.8 MG/DL — SIGNIFICANT CHANGE UP (ref 2.5–4.5)
PLATELET # BLD AUTO: 260 K/UL — SIGNIFICANT CHANGE UP (ref 150–400)
PLATELET # BLD AUTO: 262 K/UL — SIGNIFICANT CHANGE UP (ref 150–400)
POTASSIUM SERPL-MCNC: 3.6 MMOL/L — SIGNIFICANT CHANGE UP (ref 3.5–5.3)
POTASSIUM SERPL-SCNC: 3.6 MMOL/L — SIGNIFICANT CHANGE UP (ref 3.5–5.3)
PROT SERPL-MCNC: 6.7 G/DL — SIGNIFICANT CHANGE UP (ref 6–8.3)
PROT UR-MCNC: 300 MG/DL
PROTHROM AB SERPL-ACNC: 13.7 SEC — HIGH (ref 9.9–13.4)
RBC # BLD: 4.06 M/UL — LOW (ref 4.2–5.8)
RBC # BLD: 4.46 M/UL — SIGNIFICANT CHANGE UP (ref 4.2–5.8)
RBC # FLD: 13.7 % — SIGNIFICANT CHANGE UP (ref 10.3–14.5)
RBC # FLD: 13.8 % — SIGNIFICANT CHANGE UP (ref 10.3–14.5)
RBC CASTS # UR COMP ASSIST: 117 /HPF — HIGH (ref 0–4)
REVIEW: SIGNIFICANT CHANGE UP
SODIUM SERPL-SCNC: 136 MMOL/L — SIGNIFICANT CHANGE UP (ref 135–145)
SP GR SPEC: 1.02 — SIGNIFICANT CHANGE UP (ref 1–1.03)
SPECIMEN SOURCE: SIGNIFICANT CHANGE UP
SQUAMOUS # UR AUTO: 0 /HPF — SIGNIFICANT CHANGE UP (ref 0–5)
UROBILINOGEN FLD QL: 0.2 MG/DL — SIGNIFICANT CHANGE UP (ref 0.2–1)
WBC # BLD: 8.07 K/UL — SIGNIFICANT CHANGE UP (ref 3.8–10.5)
WBC # BLD: 8.89 K/UL — SIGNIFICANT CHANGE UP (ref 3.8–10.5)
WBC # FLD AUTO: 8.07 K/UL — SIGNIFICANT CHANGE UP (ref 3.8–10.5)
WBC # FLD AUTO: 8.89 K/UL — SIGNIFICANT CHANGE UP (ref 3.8–10.5)
WBC UR QL: 950 /HPF — HIGH (ref 0–5)

## 2025-05-10 PROCEDURE — 50387 CHANGE NEPHROURETERAL CATH: CPT | Mod: 50

## 2025-05-10 PROCEDURE — 99222 1ST HOSP IP/OBS MODERATE 55: CPT | Mod: GC

## 2025-05-10 PROCEDURE — 99223 1ST HOSP IP/OBS HIGH 75: CPT

## 2025-05-10 RX ORDER — PIPERACILLIN-TAZO-DEXTROSE,ISO 2.25G/50ML
3.38 IV SOLUTION, PIGGYBACK PREMIX FROZEN(ML) INTRAVENOUS EVERY 8 HOURS
Refills: 0 | Status: DISCONTINUED | OUTPATIENT
Start: 2025-05-10 | End: 2025-05-13

## 2025-05-10 RX ORDER — ONDANSETRON HCL/PF 4 MG/2 ML
4 VIAL (ML) INJECTION ONCE
Refills: 0 | Status: DISCONTINUED | OUTPATIENT
Start: 2025-05-10 | End: 2025-05-10

## 2025-05-10 RX ORDER — TRAZODONE HCL 100 MG
50 TABLET ORAL AT BEDTIME
Refills: 0 | Status: DISCONTINUED | OUTPATIENT
Start: 2025-05-10 | End: 2025-05-13

## 2025-05-10 RX ORDER — CLONAZEPAM 0.5 MG/1
1 TABLET ORAL
Refills: 0 | Status: DISCONTINUED | OUTPATIENT
Start: 2025-05-10 | End: 2025-05-13

## 2025-05-10 RX ORDER — B1/B2/B3/B5/B6/B12/VIT C/FOLIC 500-0.5 MG
1 TABLET ORAL DAILY
Refills: 0 | Status: DISCONTINUED | OUTPATIENT
Start: 2025-05-10 | End: 2025-05-13

## 2025-05-10 RX ORDER — POLYETHYLENE GLYCOL 3350 17 G/17G
17 POWDER, FOR SOLUTION ORAL DAILY
Refills: 0 | Status: DISCONTINUED | OUTPATIENT
Start: 2025-05-10 | End: 2025-05-13

## 2025-05-10 RX ORDER — METOPROLOL SUCCINATE 50 MG/1
100 TABLET, EXTENDED RELEASE ORAL DAILY
Refills: 0 | Status: DISCONTINUED | OUTPATIENT
Start: 2025-05-10 | End: 2025-05-13

## 2025-05-10 RX ORDER — HYDROMORPHONE/SOD CHLOR,ISO/PF 2 MG/10 ML
0.5 SYRINGE (ML) INJECTION
Refills: 0 | Status: DISCONTINUED | OUTPATIENT
Start: 2025-05-10 | End: 2025-05-10

## 2025-05-10 RX ORDER — LABETALOL HYDROCHLORIDE 200 MG/1
10 TABLET, FILM COATED ORAL ONCE
Refills: 0 | Status: COMPLETED | OUTPATIENT
Start: 2025-05-10 | End: 2025-05-10

## 2025-05-10 RX ORDER — EZETIMIBE 10 MG/1
10 TABLET ORAL AT BEDTIME
Refills: 0 | Status: DISCONTINUED | OUTPATIENT
Start: 2025-05-10 | End: 2025-05-13

## 2025-05-10 RX ORDER — OXYCODONE HYDROCHLORIDE 30 MG/1
2.5 TABLET ORAL EVERY 4 HOURS
Refills: 0 | Status: DISCONTINUED | OUTPATIENT
Start: 2025-05-10 | End: 2025-05-13

## 2025-05-10 RX ORDER — PIPERACILLIN-TAZO-DEXTROSE,ISO 2.25G/50ML
3.38 IV SOLUTION, PIGGYBACK PREMIX FROZEN(ML) INTRAVENOUS EVERY 6 HOURS
Refills: 0 | Status: DISCONTINUED | OUTPATIENT
Start: 2025-05-10 | End: 2025-05-10

## 2025-05-10 RX ORDER — CLONAZEPAM 0.5 MG/1
1 TABLET ORAL
Refills: 0 | DISCHARGE

## 2025-05-10 RX ORDER — ERGOCALCIFEROL 1.25 MG/1
CAPSULE ORAL
Refills: 0 | DISCHARGE

## 2025-05-10 RX ORDER — OXYCODONE HYDROCHLORIDE 30 MG/1
5 TABLET ORAL EVERY 4 HOURS
Refills: 0 | Status: DISCONTINUED | OUTPATIENT
Start: 2025-05-10 | End: 2025-05-13

## 2025-05-10 RX ORDER — BUPROPION HYDROBROMIDE 522 MG/1
150 TABLET, EXTENDED RELEASE ORAL DAILY
Refills: 0 | Status: DISCONTINUED | OUTPATIENT
Start: 2025-05-10 | End: 2025-05-13

## 2025-05-10 RX ORDER — DILTIAZEM HYDROCHLORIDE 120 MG/1
240 CAPSULE, EXTENDED RELEASE ORAL DAILY
Refills: 0 | Status: DISCONTINUED | OUTPATIENT
Start: 2025-05-10 | End: 2025-05-13

## 2025-05-10 RX ORDER — GABAPENTIN 400 MG/1
300 CAPSULE ORAL EVERY 8 HOURS
Refills: 0 | Status: DISCONTINUED | OUTPATIENT
Start: 2025-05-10 | End: 2025-05-13

## 2025-05-10 RX ORDER — OXYCODONE HYDROCHLORIDE 30 MG/1
5 TABLET ORAL ONCE
Refills: 0 | Status: DISCONTINUED | OUTPATIENT
Start: 2025-05-10 | End: 2025-05-10

## 2025-05-10 RX ORDER — ACETAMINOPHEN 500 MG/5ML
1000 LIQUID (ML) ORAL ONCE
Refills: 0 | Status: DISCONTINUED | OUTPATIENT
Start: 2025-05-10 | End: 2025-05-10

## 2025-05-10 RX ORDER — SENNA 187 MG
2 TABLET ORAL DAILY
Refills: 0 | Status: DISCONTINUED | OUTPATIENT
Start: 2025-05-10 | End: 2025-05-13

## 2025-05-10 RX ORDER — BICTEGRAVIR SODIUM, EMTRICITABINE, AND TENOFOVIR ALAFENAMIDE FUMARATE 50; 200; 25 MG/1; MG/1; MG/1
1 TABLET ORAL DAILY
Refills: 0 | Status: DISCONTINUED | OUTPATIENT
Start: 2025-05-10 | End: 2025-05-13

## 2025-05-10 RX ADMIN — Medication 5 MILLIGRAM(S): at 12:35

## 2025-05-10 RX ADMIN — OXYCODONE HYDROCHLORIDE 5 MILLIGRAM(S): 30 TABLET ORAL at 21:35

## 2025-05-10 RX ADMIN — SODIUM CHLORIDE 100 MILLILITER(S): 9 INJECTION, SOLUTION INTRAVENOUS at 05:00

## 2025-05-10 RX ADMIN — EZETIMIBE 10 MILLIGRAM(S): 10 TABLET ORAL at 22:38

## 2025-05-10 RX ADMIN — LABETALOL HYDROCHLORIDE 10 MILLIGRAM(S): 200 TABLET, FILM COATED ORAL at 13:21

## 2025-05-10 RX ADMIN — Medication 50 MILLIGRAM(S): at 21:35

## 2025-05-10 RX ADMIN — Medication 50 MILLIGRAM(S): at 01:26

## 2025-05-10 RX ADMIN — OXYCODONE HYDROCHLORIDE 5 MILLIGRAM(S): 30 TABLET ORAL at 08:32

## 2025-05-10 RX ADMIN — BICTEGRAVIR SODIUM, EMTRICITABINE, AND TENOFOVIR ALAFENAMIDE FUMARATE 1 TABLET(S): 50; 200; 25 TABLET ORAL at 15:02

## 2025-05-10 RX ADMIN — Medication 0.5 MILLIGRAM(S): at 12:44

## 2025-05-10 RX ADMIN — OXYCODONE HYDROCHLORIDE 2.5 MILLIGRAM(S): 30 TABLET ORAL at 00:45

## 2025-05-10 RX ADMIN — POLYETHYLENE GLYCOL 3350 17 GRAM(S): 17 POWDER, FOR SOLUTION ORAL at 15:01

## 2025-05-10 RX ADMIN — BUPROPION HYDROBROMIDE 150 MILLIGRAM(S): 522 TABLET, EXTENDED RELEASE ORAL at 15:55

## 2025-05-10 RX ADMIN — OXYCODONE HYDROCHLORIDE 2.5 MILLIGRAM(S): 30 TABLET ORAL at 00:00

## 2025-05-10 RX ADMIN — SODIUM CHLORIDE 100 MILLILITER(S): 9 INJECTION, SOLUTION INTRAVENOUS at 15:59

## 2025-05-10 RX ADMIN — Medication 1 TABLET(S): at 15:04

## 2025-05-10 RX ADMIN — EZETIMIBE 10 MILLIGRAM(S): 10 TABLET ORAL at 01:26

## 2025-05-10 RX ADMIN — METOPROLOL SUCCINATE 100 MILLIGRAM(S): 50 TABLET, EXTENDED RELEASE ORAL at 05:32

## 2025-05-10 RX ADMIN — Medication 25 GRAM(S): at 21:34

## 2025-05-10 RX ADMIN — OXYCODONE HYDROCHLORIDE 5 MILLIGRAM(S): 30 TABLET ORAL at 08:02

## 2025-05-10 RX ADMIN — Medication 3 MILLIGRAM(S): at 01:28

## 2025-05-10 RX ADMIN — Medication 200 GRAM(S): at 00:00

## 2025-05-10 RX ADMIN — DILTIAZEM HYDROCHLORIDE 240 MILLIGRAM(S): 120 CAPSULE, EXTENDED RELEASE ORAL at 05:32

## 2025-05-10 RX ADMIN — GABAPENTIN 300 MILLIGRAM(S): 400 CAPSULE ORAL at 22:38

## 2025-05-10 RX ADMIN — GABAPENTIN 300 MILLIGRAM(S): 400 CAPSULE ORAL at 15:01

## 2025-05-10 RX ADMIN — OXYCODONE HYDROCHLORIDE 5 MILLIGRAM(S): 30 TABLET ORAL at 22:30

## 2025-05-10 RX ADMIN — Medication 2 TABLET(S): at 15:01

## 2025-05-10 RX ADMIN — SODIUM CHLORIDE 175 MILLILITER(S): 9 INJECTION, SOLUTION INTRAVENOUS at 03:13

## 2025-05-10 RX ADMIN — OXYCODONE HYDROCHLORIDE 2.5 MILLIGRAM(S): 30 TABLET ORAL at 21:00

## 2025-05-10 RX ADMIN — OXYCODONE HYDROCHLORIDE 2.5 MILLIGRAM(S): 30 TABLET ORAL at 20:04

## 2025-05-10 RX ADMIN — Medication 25 GRAM(S): at 04:59

## 2025-05-10 RX ADMIN — GABAPENTIN 300 MILLIGRAM(S): 400 CAPSULE ORAL at 05:31

## 2025-05-10 NOTE — H&P ADULT - PROBLEM SELECTOR PLAN 7
Home meds: Metoprolol succinate 100 mg qd, Diltiazem  mg qd    Plan:  - c/w home metoprolol succinate and diltiazem

## 2025-05-10 NOTE — H&P ADULT - ATTENDING COMMENTS
Pt is a 59-year-old male with a past medical history of HIV, prostate cancer, anal cancer (s/p chemo/radiation c/b ostomy creation and bilateral nephrostomy tubes), anxiety/depression, HTN, HLD, BPH presenting with decreased nephrostomy tube output, flank pain. Pt found to be septic likely due to UTI and w/ evidence of mod-severe b/l hydronephrosis new on right side. Pt evaluated by IR with plans for nephrostomy tube exchange. Plan to continue with IV zosyn, f/u cultures. Pt also with decreased output from ostomy, no evidence of obstruction on CT a/p however w/o contrast. Start bowel  regimen, may need surgery eval if not improved.

## 2025-05-10 NOTE — H&P ADULT - NSHPPHYSICALEXAM_GEN_ALL_CORE
Vital Signs Last 24 Hrs  T(C): 37.2 (09 May 2025 19:38), Max: 37.2 (09 May 2025 19:38)  T(F): 98.9 (09 May 2025 19:38), Max: 98.9 (09 May 2025 19:38)  HR: 95 (09 May 2025 19:38) (87 - 95)  BP: 157/92 (09 May 2025 19:38) (145/100 - 157/92)  BP(mean): --  RR: 16 (09 May 2025 19:38) (16 - 18)  SpO2: 99% (09 May 2025 19:38) (99% - 99%)    Parameters below as of 09 May 2025 19:38  Patient On (Oxygen Delivery Method): room air        PHYSICAL EXAM:  GENERAL: NAD, well-groomed, well-developed  HEAD:  Atraumatic, Normocephalic  EYES: EOMI, conjunctiva and sclera clear  ENMT:  Moist mucous membranes  NECK: Supple  NERVOUS SYSTEM: AOX3, motor and sensation grossly intact in b/l UE and b/l LE  PSYCHIATRIC: Appropriate affect and mood  CHEST/LUNG: Clear to auscultation bilaterally; No rales, rhonchi, wheezing, or rubs  HEART: Regular rate and rhythm; No murmurs, rubs, or gallops. No LE edema  ABDOMEN: Soft, tender to palpation diffusely, mildly distended, no rebound tenderness, no tenderness over ostomy site. Nephrostomy tubes draining small amounts of yellow-mohan liquid and purulence bilaterally. +CVA tenderness.  EXTREMITIES: No clubbing, cyanosis  SKIN: small areas of pale pink, blanching, scaled rash - across body and back.

## 2025-05-10 NOTE — PRE PROCEDURE NOTE - PRE PROCEDURE EVALUATION
Interventional Radiology    HPI: 59y Male w/ past medical and surgery history of HTN, HLD, HIV, Anxiety, Anal Cancer/prostate cancer colostomy c/b Parastomal hernia obstruction, previous chronic indwelling b/l PCN converted to PCNU's presents with flank pain and decreased output from tubes. Pt transferred for IR eval. Patient presents to IR for PCNU eval/possible exchange.    Allergies: No Known Allergies    Medications (Abx/Cardiac/Anticoagulation/Blood Products)    diltiazem   CD: 240 milliGRAM(s) Oral (05-10 @ 05:32)  metoprolol succinate ER: 100 milliGRAM(s) Oral (05-10 @ 05:32)  piperacillin/tazobactam IVPB..: 25 mL/Hr IV Intermittent (05-10 @ 04:59)  piperacillin/tazobactam IVPB...: 200 mL/Hr IV Intermittent (05-10 @ 00:00)    Data:  180.3, 180.3  83.9, 83.5  T(C): 36.9  HR: 88  BP: 130/89  RR: 17  SpO2: 98%    Exam  General: No acute distress  Chest: Non labored breathing  Abdomen: Non-distended  Extremities: No swelling, warm    -WBC 8.89 / HgB 12.1 / Hct 35.5 / Plt 260  -Na 136 / Cl 101 / BUN 15 / Glucose 128  -K 3.6 / CO2 23 / Cr 0.85  -ALT 7 / Alk Phos 89 / T.Bili 0.3  -INR1.18    Imaging:     Plan:   59y Male w/ past medical and surgery history of HTN, HLD, HIV, Anxiety, Anal Cancer/prostate cancer colostomy c/b Parastomal hernia obstruction, previous chronic indwelling b/l PCN converted to PCNU's presents with flank pain and decreased output from tubes. Pt transferred for IR eval. Patient presents to IR for PCNU eval/possible exchange.    -- Relevant imaging and labs were reviewed.   -- Risks, benefits, and alternatives were explained to the patient and informed consent was obtained. Patient's DNR/DNI rescinded for procedure.    Han Haney M.D.  PGY5/R4, Interventional Radiology Senior Resident    -Available on [employer] TEAMS for all non-urgent questions  -Emergent issues: [facility]-p.[phone]; [facility]-p.[phone] ([phone])  -Non-emergent consults: Please place a [place] order "Consult-Interventional Radiology" with an appropriate callback number  -Scheduling questions: [facility]: [phone]; [facility]: [phone]  -Clinic/Outpatient booking: [facility]: [phone]; [facility]: [phone]

## 2025-05-10 NOTE — CONSULT NOTE ADULT - ATTENDING COMMENTS
59M PMH HIV, prostate cancer, anal cancer (s/p chemo/radiation and APR and bilateral nephrostomy tubes), anxiety/depression, HTN, HLD, BPH, PSH APR and recent parastomal hernia repair w mesh) presenting with hydronephrosis and nephrostomy tube not draining/flushing. Patient admitted with UTI and plans for IR exchange of nephrostomy tube. General surgery consulted for constipation in setting of recent parastomal hernia surgery.     # 4/10/25 - Robot-assisted Parastomal Hernia Sugarbaker Repair  # Constipation  - No obstruction.   - Continue aggressive bowel regimen.  - Can do GG challenge for therapeutic purposes.

## 2025-05-10 NOTE — H&P ADULT - PROBLEM SELECTOR PLAN 3
Recent hernia repair, ~3 weeks ago with Dr. Boston. Was prescribed flexeril, gabapentin, and dilaudid for pain.    Plan:  - outpatient follow up  - pain: Tylenol prn for mild, oxycodone 2.5 mg for moderate, and 5 mg for severe pain.  - c/w gabapentin Recent hernia repair, ~3 weeks ago with Dr. Boston. Was prescribed flexeril, gabapentin, and dilaudid for pain. Has been having decreased ostomy output.     Plan:  - outpatient follow up  - pain: Tylenol prn for mild, oxycodone 2.5 mg for moderate, and 5 mg for severe pain.  - c/w gabapentin  - starting miralax/senna  - if patient continues having low output and nausea/vomiting, consider surgery consult in AM

## 2025-05-10 NOTE — H&P ADULT - PROBLEM SELECTOR PROBLEM 7
Spoke with spouse and she stated that the patient's INR was 4.6 on 1/27/20. We received a fax from [facility] that confirmed this number.   Hyperlipidemia Hypertension

## 2025-05-10 NOTE — H&P ADULT - NSHPLABSRESULTS_GEN_ALL_CORE
Labs:                        14.3   14.33 )-----------( 308      ( 09 May 2025 14:02 )             42.7     05-09    138  |  105  |  19  ----------------------------<  128[H]  4.1   |  27  |  1.19    Ca    10.0      09 May 2025 14:02    TPro  8.3  /  Alb  3.6  /  TBili  0.8  /  DBili  x   /  AST  17  /  ALT  16  /  AlkPhos  106  05-09    PT/INR - ( 09 May 2025 20:08 )   PT: 13.8 sec;   INR: 1.19 ratio         PTT - ( 09 May 2025 20:08 )  PTT:29.7 sec < from: CT Abdomen and Pelvis No Cont (05.09.25 @ 15:51) >    FINDINGS:  LOWER CHEST: Within normal limits.    LIVER: Within normal limits.  BILE DUCTS: Normal caliber.  GALLBLADDER: Within normal limits.  SPLEEN: Within normal limits.  PANCREAS: Within normal limits.  ADRENALS: Within normal limits.  KIDNEYS/URETERS: Bilateral percutaneous nephroureteral catheters   terminating in the bladder. Moderate to severe bilateral hydronephrosis,   new on the right. Bilateral renal cysts.    BLADDER: Within normal limits.  REPRODUCTIVE ORGANS: Prostate within normal limits. Bilateral hydroceles.    BOWEL: No bowel obstruction. Appendix is normal. Status post APR with   lipomatous flap in the pelvic floor and left lower quadrant colostomy.  PERITONEUM/RETROPERITONEUM: Within normal limits.  VESSELS: Atherosclerotic calcification. IVC filter.  LYMPH NODES: No lymphadenopathy.  ABDOMINAL WALL: Postsurgical changes. Status post repair of parastomal   hernia with mesh.  BONES: Within normal limits.    IMPRESSION:  Moderate to severe bilateral hydronephrosis, new on the right. Bilateral   percutaneous nephroureteral catheters inplace.    < end of copied text >

## 2025-05-10 NOTE — H&P ADULT - PROBLEM SELECTOR PLAN 5
Hx of anal cancer in 2020 s/p chemo, radiation, and post-radiation complications requiring ostomy. Follows with Dr. Vanegas in [place]. In remission.  - CT A/P with IVC filter    Plan:  - outpatient follow up for cancer and to discuss IVC filter

## 2025-05-10 NOTE — H&P ADULT - PROBLEM SELECTOR PLAN 1
Presenting with leukocytosis, HR>90, and reported fever at home, with likely urinary source given UA purulent with bacteria, leuk esterase, and WBC. Also with dysuria and increased frequency.  - s/p Zosyn in ED    Plan:  - C/w zosyn  - obtain urine cultures  - obtain blood cultures Presenting with leukocytosis, HR>90, and reported fever at home, with likely urinary source given UA purulent with bacteria, leuk esterase, and WBC. Also with dysuria and increased frequency.  - Past urine cultures with Providencia rettgeri (resistant to ampicillins), and Proteus mirabilis    Plan:  - C/w zosyn (05/10 - )  - obtain urine cultures  - obtain blood cultures

## 2025-05-10 NOTE — CONSULT NOTE ADULT - SUBJECTIVE AND OBJECTIVE BOX
GENERAL SURGERY CONSULT NOTE  --------------------------------------------------------------------------------------------    HPI:  59M PMH HIV, prostate cancer, anal cancer (s/p chemo/radiation and APR and bilateral nephrostomy tubes), anxiety/depression, HTN, HLD, BPH, PSH APR and recent parastomal hernia repair w mesh) presenting with hydronephrosis and nephrostomy tube not draining/flushing. Patient admitted with UTI and plans for IR exchange of nephrostomy tube.     At home had fever, measured to 100-101, nausea/vomiting, poor PO intake, and constipation (last BM 3-4 days ago).   Patient was supposed to see Dr. Boston on Thursday for f/u but was in the ED. States everything has been going well at home w recovery post hernia repair. When constipated, tried miralax and lactulose but did not try anything consistently. Endorses passing flatus. No abd pain, current nausea or emesis.       PMH/PSH:  PAST MEDICAL & SURGICAL HISTORY:  HTN (hypertension)    HIV infection    Anxiety    History of anal cancer    History of prostate cancer    HLD (hyperlipidemia)    Parastomal hernia with obstruction and without gangrene    S/P colostomy    Nephrostomy present        FAMILY HISTORY:  FH: prostate cancer    FH: breast cancer    [] Family history not pertinent as reviewed with the patient and family    SOCIAL HISTORY:        ALLERGIES:   No Known Allergies      HOME MEDICATIONS:       CURRENT MEDICATIONS  MEDICATIONS (STANDING): bictegravir 50 mG/emtricitabine 200 mG/tenofovir alafenamide 25 mG (BIKTARVY) 1 Tablet(s) Oral daily  buPROPion XL (24-Hour) . 150 milliGRAM(s) Oral daily  dextrose 5% + lactated ringers. 1000 milliLiter(s) IV Continuous <Continuous>  diltiazem    milliGRAM(s) Oral daily  ezetimibe 10 milliGRAM(s) Oral at bedtime  gabapentin 300 milliGRAM(s) Oral every 8 hours  labetalol Injectable 10 milliGRAM(s) IV Push once  metoprolol succinate  milliGRAM(s) Oral daily  multivitamin 1 Tablet(s) Oral daily  piperacillin/tazobactam IVPB.. 3.375 Gram(s) IV Intermittent every 8 hours  polyethylene glycol 3350 17 Gram(s) Oral daily  senna 2 Tablet(s) Oral daily  traZODone 50 milliGRAM(s) Oral at bedtime    MEDICATIONS (PRN):acetaminophen     Tablet .. 650 milliGRAM(s) Oral every 6 hours PRN Temp greater or equal to 38C (100.4F), Mild Pain (1 - 3)  acetaminophen   IVPB .. 1000 milliGRAM(s) IV Intermittent once PRN Mild Pain (1 - 3)  clonazePAM  Tablet 1 milliGRAM(s) Oral two times a day PRN for anxiety  HYDROmorphone  Injectable 0.5 milliGRAM(s) IV Push every 10 minutes PRN Severe Pain (7 - 10)  melatonin 3 milliGRAM(s) Oral at bedtime PRN Insomnia  ondansetron Injectable 4 milliGRAM(s) IV Push once PRN Nausea and/or Vomiting  oxyCODONE    IR 5 milliGRAM(s) Oral once PRN Moderate Pain (4 - 6)  oxyCODONE    IR 2.5 milliGRAM(s) Oral every 4 hours PRN Moderate Pain (4 - 6)  oxyCODONE    IR 5 milliGRAM(s) Oral every 4 hours PRN Severe Pain (7 - 10)  zolpidem 5 milliGRAM(s) Oral at bedtime PRN Insomnia  zolpidem 5 milliGRAM(s) Oral at bedtime PRN Insomnia    --------------------------------------------------------------------------------------------    Vitals:   T(C): 36.5 (05-10-25 @ 12:20), Max: 37.2 (25 @ 19:38)  HR: 75 (05-10-25 @ 13:00) (75 - 96)  BP: 150/110 (05-10-25 @ 13:00) (130/89 - 158/106)  RR: 13 (05-10-25 @ 13:00) (13 - 21)  SpO2: 97% (05-10-25 @ 13:00) (96% - 99%)  CAPILLARY BLOOD GLUCOSE          Height (cm): 180.3 ( 19:38)  Weight (kg): 83.9 ( 19:38)  BMI (kg/m2): 25.8 (:38)  BSA (m2): 2.04 (:38)      PHYSICAL EXAM:  General: NAD, Lying in bed comfortably  Neuro: A+Ox3  Cardio: RRR  Resp: Good effort, nonlabored breathing on room air  GI/Abd: Soft, NT/ND, no rebound/guarding, no masses palpated, stoma patent  --------------------------------------------------------------------------------------------    LABS  CBC (05-10 @ 02:15)                              12.1[L]                         8.89    )----------------(  260        --    % Neutrophils, --    % Lymphocytes, ANC: --                                  35.5[L]  CBC ( @ 14:02)                              14.3                           14.33[H]  )----------------(  308        --    % Neutrophils, --    % Lymphocytes, ANC: --                                  42.7      BMP (05-10 @ 02:15)             136     |  101     |  15    		Ca++ --      Ca 9.0                ---------------------------------( 128[H]		Mg 2.00               3.6     |  23      |  0.85  			Ph 2.8     BMP ( @ 14:02)             138     |  105     |  19    		Ca++ --      Ca 10.0               ---------------------------------( 128[H]		Mg --                 4.1     |  27      |  1.19  			Ph --        LFTs (05-10 @ 02:15)      TPro 6.7 / Alb 3.5 / TBili 0.3 / DBili -- / AST 12 / ALT 7 / AlkPhos 89  LFTs ( 14:02)      TPro 8.3 / Alb 3.6 / TBili 0.8 / DBili -- / AST 17 / ALT 16 / AlkPhos 106    Coags (05-10 @ 02:15)  aPTT 28.8 / INR 1.18[H] / PT 13.7[H]  Coags ( @ 20:08)  aPTT 29.7 / INR 1.19[H] / PT 13.8[H]      ABG ( @ 14:02)      /  /  /  /  / %     Lactate:  3.5[H]      --------------------------------------------------------------------------------------------    MICROBIOLOGY  Urinalysis (05-10 @ 07:09):     Color: Yellow / Appearance: Turbid[!] / S.019 / pH: 7.0 / Gluc: Negative / Ketones: Trace[!] / Bili: Negative / Urobili: 0.2 / Protein :300[!] / Nitrites: Negative / Leuk.Est: Large[!] / RBC:  / WBC:  / Sq Epi:  / Non Sq Epi:  / Bacteria          --------------------------------------------------------------------------------------------    IMAGING  < from: CT Abdomen and Pelvis No Cont (25 @ 15:51) >  FINDINGS:  LOWER CHEST: Within normal limits.    LIVER: Within normal limits.  BILE DUCTS: Normal caliber.  GALLBLADDER: Within normal limits.  SPLEEN: Within normal limits.  PANCREAS: Within normal limits.  ADRENALS: Within normal limits.  KIDNEYS/URETERS: Bilateral percutaneous nephroureteral catheters   terminating in the bladder. Moderate to severe bilateral hydronephrosis,   new on the right. Bilateral renal cysts.    BLADDER: Within normal limits.  REPRODUCTIVE ORGANS: Prostate within normal limits. Bilateral hydroceles.    BOWEL: No bowel obstruction. Appendix is normal. Status post APR with   lipomatous flap in the pelvic floor and left lower quadrant colostomy.  PERITONEUM/RETROPERITONEUM: Within normal limits.  VESSELS: Atherosclerotic calcification. IVC filter.  LYMPH NODES: No lymphadenopathy.  ABDOMINAL WALL: Postsurgical changes. Status post repair of parastomal   hernia with mesh.  BONES: Within normal limits.    IMPRESSION:  Moderate to severe bilateral hydronephrosis, new on the right. Bilateral   percutaneous nephroureteral catheters inplace.    < end of copied text >      --------------------------------------------------------------------------------------------

## 2025-05-10 NOTE — H&P ADULT - HISTORY OF PRESENT ILLNESS
David Turcios is a 59-year-old male with a past medical history of HIV, prostate cancer, anal cancer (s/p chemo/radiation c/b ostomy creation and bilateral nephrostomy tubes), anxiety/depression, HTN, HLD, BPH presenting with hydronephrosis and nephrostomy tube not draining/flushing.    Patient notes that on 05/03-05/04 the right nephrostomy tube stopped draining, but then slow started draining again. On 05/07, the left nephrostomy tube stopped draining and would no longer flush. The right nephrostomy tube then stopped draining again. Still urinates through penis as well and endorses burning with urination, increased frequency, and purulent urine for around the same time. Also, had fever at home, measured to 100-101, nausea/vomiting, poor  PO intake, and constipation (last BM 3-4 days ago).     Recent operation, ~3 weeks ago, for hernia repair around ostomy and mesh placement with Dr. Boston. Patient was due for follow up 05/10, but presented to the ED for nephrostomy tubes.     ED course: presented with T 98.9, HR 95, /92, 16, 99%.  David Turcios is a 59-year-old male with a past medical history of HIV, prostate cancer, anal cancer (s/p chemo/radiation c/b ostomy creation and bilateral nephrostomy tubes), anxiety/depression, HTN, HLD, BPH presenting with hydronephrosis and nephrostomy tube not draining/flushing.    Patient notes that on 05/03-05/04 the right nephrostomy tube stopped draining, but then slow started draining again. On 05/07, the left nephrostomy tube stopped draining and would no longer flush. The right nephrostomy tube then stopped draining again. Still urinates through penis as well and endorses burning with urination, increased frequency, and purulent urine for around the same time. Also, had fever at home, measured to 100-101, nausea/vomiting, poor  PO intake, and constipation (last BM 3-4 days ago).     Recent operation, ~3 weeks ago, for hernia repair around ostomy and mesh placement with Dr. Boston. Patient was due for follow up 05/10, but presented to the ED for nephrostomy tubes. Oncologist was Dr. Vanegas at [place], had chemotherapy, radiation, and treatment in 2020. Now in remission. Also was taking Lupron for prostate cancer, but currently not taking as patient being  monitored.     ED course: presented with T 98.9, HR 95, /92, 16, 99%.  David Turcios is a 59-year-old male with a past medical history of HIV, prostate cancer, anal cancer (s/p chemo/radiation c/b ostomy creation and bilateral nephrostomy tubes), anxiety/depression, HTN, HLD, BPH presenting with hydronephrosis and nephrostomy tube not draining/flushing.    Patient notes that on 05/03-05/04 the right nephrostomy tube stopped draining, but then slow started draining again. On 05/07, the left nephrostomy tube stopped draining and would no longer flush. The right nephrostomy tube then stopped draining again. Still urinates through penis as well and endorses burning with urination, increased frequency, and purulent urine for around the same time. Also, had fever at home, measured to 100-101, nausea/vomiting, poor  PO intake, and constipation (last BM 3-4 days ago).     Recent operation, ~3 weeks ago, for hernia repair around ostomy and mesh placement with Dr. Boston. Patient was due for follow up 05/10, but presented to the ED for nephrostomy tubes. Oncologist was Dr. Vanegas at [place], had chemotherapy, radiation, and treatment in 2020. Now in remission. Also was taking Lupron for prostate cancer, but currently not taking as patient being  monitored.     ED course: presented with T 98.9, HR 95, /92, 16, 99%. Given Zosyn x1

## 2025-05-10 NOTE — CONSULT NOTE ADULT - ASSESSMENT
ASSESSMENT: 59M PMH HIV, prostate cancer, anal cancer (s/p chemo/radiation and APR and bilateral nephrostomy tubes), anxiety/depression, HTN, HLD, BPH, PSH APR and recent parastomal hernia repair w mesh) presenting with hydronephrosis and nephrostomy tube not draining/flushing. Patient admitted with UTI and plans for IR exchange of nephrostomy tube. General surgery consulted for constipation in setting of recent parastomal hernia surgery.     Recs:  - no acute surgical intervention  - c/w bowel regimen  - can perform a gastrographin challenge - both therapeutic and diagnostic  - rest of care per primary  - general surgery to follow    Discussed with attending Dr. Rito LOPEZ Team  [id number]  
Interventional Radiology    Evaluate for Procedure:     HPI: 59y Male w/ past medical and surgery history of HTN, HLD, HIV, Anxiety, Anal Cancer/prostate cancer colostomy c/b Parastomal hernia obstruction, previous chronic indwelling b/l PCN converted to PCNU's presents with flank pain and decreased output from tubes. Pt transferred for IR eval.     Allergies: No Known Allergies    Medications (Abx/Cardiac/Anticoagulation/Blood Products)    cefTRIAXone   IVPB: 100 mL/Hr IV Intermittent (05-09 @ 17:25)    Data:  180.3, 180.3  83.9, 83.5  T(C): 37.2  HR: 95  BP: 157/92  RR: 16  SpO2: 99%    -WBC 14.33 / HgB 14.3 / Hct 42.7 / Plt 308  -Na 138 / Cl 105 / BUN 19 / Glucose 128  -K 4.1 / CO2 27 / Cr 1.19  -ALT 16 / Alk Phos 106 / T.Bili 0.8  -INR 1.19 / PTT 29.7      Radiology:     Assessment/Plan:   59y Male w/ past medical and surgery history of HTN, HLD, HIV, Anxiety, Anal Cancer/prostate cancer colostomy c/b Parastomal hernia obstruction, previous chronic indwelling b/l PCN converted to PCNU's presents with flank pain and decreased output from tubes. Pt transferred for IR eval.     -- IR will plan to perform 5/10  -- NPO after midnight 5/10  -- Please hold anticoagulation  -- AM CBC, BMP, Coags 5/10  -- Please place IR procedure order under Dr. Gonzales  -- Please write IR pre-procedure note    Han Haney M.D.  PGY5/R4, Interventional Radiology Senior Resident    -Available on [employer] TEAMS for all non-urgent questions  -Emergent issues: [facility]-p.[phone]; [facility]-p.[phone] ([phone])  -Non-emergent consults: Please place a [place] order "Consult-Interventional Radiology" with an appropriate callback number  -Scheduling questions: [facility]: [phone]; [facility]: [phone]  -Clinic/Outpatient booking: [facility]: [phone]; [facility]: [phone]

## 2025-05-10 NOTE — H&P ADULT - TIME BILLING
I have spent a total of 75 minutes time spent to prepare to see the patient, obtaining and reviewing history, physical examination, explaining the diagnosis, prognosis and treatment plan with the patient/family/caregiver. I also have spent the time interpreting results, medicine reconciliation, and documentation as above.

## 2025-05-10 NOTE — H&P ADULT - PROBLEM SELECTOR PLAN 8
DVT Prophylaxis: Lovenox after procedure  Diet: NPO pending procedure, DASH/TLC after procedure  Code: DNR/DNI TNIV  PT consulted, recs appreciated  Dispo: anticipate to home pending procedure and improvement of UTI/sepsis

## 2025-05-10 NOTE — CHART NOTE - NSCHARTNOTEFT_GEN_A_CORE
PRE-INTERVENTIONAL RADIOLOGY PROCEDURE NOTE    Patient Name: CARL ROWLEY    Patient Age: 59y    Patient Gender: Male    Procedure: Nephrostomy tubes    Diagnosis/Indication: Bilateral hydronephrosis    Interventional Radiology Attending Physician: Dr. Gonzales    Ordering Attending Physician:     Pertinent Medical History:    Carl Rowley is a 59-year-old male with a past medical history of HIV, prostate cancer, anal cancer (s/p chemo/radiation c/b ostomy creation and bilateral nephrostomy tubes), anxiety/depression, HTN, HLD, BPH presenting with hydronephrosis, poor nephrostomy tube drainage, and urosepsis.      Pertinent labs:                      14.3   14.33 )-----------( 308      ( 09 May 2025 14:02 )             42.7       05-09    138  |  105  |  19  ----------------------------<  128[H]  4.1   |  27  |  1.19    Ca    10.0      09 May 2025 14:02    TPro  8.3  /  Alb  3.6  /  TBili  0.8  /  DBili  x   /  AST  17  /  ALT  16  /  AlkPhos  106  05-09      PT/INR - ( 09 May 2025 20:08 )   PT: 13.8 sec;   INR: 1.19 ratio         PTT - ( 09 May 2025 20:08 )  PTT:29.7 sec        Patient and Family Aware ? Yes PRE-INTERVENTIONAL RADIOLOGY PROCEDURE NOTE    Patient Name: CARL ROWLEY    Patient Age: 59y    Patient Gender: Male    Procedure: Nephrostomy tubes    Diagnosis/Indication: Bilateral hydronephrosis    Interventional Radiology Attending Physician: Dr. Gonzales    Ordering Attending Physician: Fahad Adams    Pertinent Medical History:    Carl Rowley is a 59-year-old male with a past medical history of HIV, prostate cancer, anal cancer (s/p chemo/radiation c/b ostomy creation and bilateral nephrostomy tubes), anxiety/depression, HTN, HLD, BPH presenting with hydronephrosis, poor nephrostomy tube drainage, and urosepsis.      Pertinent labs:                      14.3   14.33 )-----------( 308      ( 09 May 2025 14:02 )             42.7       05-09    138  |  105  |  19  ----------------------------<  128[H]  4.1   |  27  |  1.19    Ca    10.0      09 May 2025 14:02    TPro  8.3  /  Alb  3.6  /  TBili  0.8  /  DBili  x   /  AST  17  /  ALT  16  /  AlkPhos  106  05-09      PT/INR - ( 09 May 2025 20:08 )   PT: 13.8 sec;   INR: 1.19 ratio         PTT - ( 09 May 2025 20:08 )  PTT:29.7 sec        Patient and Family Aware ? Yes

## 2025-05-10 NOTE — H&P ADULT - PROBLEM SELECTOR PLAN 2
CT A/P with moderate to severe bilateral hydronephrosis in the setting of bilateral poorly draining nephrostomy tubes.    Plan:  - pending IR procedure on 05/10

## 2025-05-10 NOTE — PATIENT PROFILE ADULT - FALL HARM RISK - HARM RISK INTERVENTIONS

## 2025-05-10 NOTE — H&P ADULT - ASSESSMENT
David Turcios is a 59-year-old male with a past medical history of HIV, prostate cancer, anal cancer (s/p chemo/radiation c/b ostomy creation and bilateral nephrostomy tubes), anxiety/depression, HTN, HLD, BPH presenting with hydronephrosis, poor nephrostomy tube drainage, and urosepsis.

## 2025-05-10 NOTE — H&P ADULT - CONVERSATION DETAILS
Discussed code status with patient. Patient would not like chest compressions, intubation, or to be on life support at any time. DNR/DNI trial NIV order placed and MOLST placed in chart.

## 2025-05-10 NOTE — H&P ADULT - PROBLEM SELECTOR PLAN 6
34.9 Hx of anxiety and depression.  - Home meds: Buproprion  mg, Clonazepam 1 mg    Plan:  - c/w home Buproprion  - hold home clonazepam Hx of anxiety and depression.  - Home meds: Buproprion  mg, Clonazepam 1 mg    Plan:  - c/w home Buproprion  - c/w home clonazepam

## 2025-05-10 NOTE — H&P ADULT - NSHPREVIEWOFSYSTEMS_GEN_ALL_CORE
CONSTITUTIONAL: +fever  ENMT:   No sinus or throat pain  RESPIRATORY: No cough, No shortness of breath  CARDIOVASCULAR: No chest pain, palpitations, or leg swelling  GASTROINTESTINAL: +abdominal pain, +nausea/vomiting, +constipation  GENITOURINARY: +frequency, +dysuria, +pyuria  SKIN: +rash spots without itching  LYMPH NODES: No enlarged glands  ENDOCRINE: +polyuria  MUSCULOSKELETAL: No joint pain or swelling;   PSYCHIATRIC: +depression  HEME/LYMPH: No easy bruising, or bleeding gums  ALLERGY AND IMMUNOLOGIC: No hives

## 2025-05-10 NOTE — PROCEDURE NOTE - PROCEDURE FINDINGS AND DETAILS
Bilateral ureteral stents exchanged. New 10F x24 cm PCNU on left, 23Ia35va PCNU on left. Purulent urine from right and left tubes. Sample sent for culture

## 2025-05-11 LAB
ALBUMIN SERPL ELPH-MCNC: 3.3 G/DL — SIGNIFICANT CHANGE UP (ref 3.3–5)
ALP SERPL-CCNC: 79 U/L — SIGNIFICANT CHANGE UP (ref 40–120)
ALT FLD-CCNC: 6 U/L — SIGNIFICANT CHANGE UP (ref 4–41)
ANION GAP SERPL CALC-SCNC: 9 MMOL/L — SIGNIFICANT CHANGE UP (ref 7–14)
AST SERPL-CCNC: 8 U/L — SIGNIFICANT CHANGE UP (ref 4–40)
BASOPHILS # BLD AUTO: 0.04 K/UL — SIGNIFICANT CHANGE UP (ref 0–0.2)
BASOPHILS NFR BLD AUTO: 0.6 % — SIGNIFICANT CHANGE UP (ref 0–2)
BILIRUB DIRECT SERPL-MCNC: <0.2 MG/DL — SIGNIFICANT CHANGE UP (ref 0–0.3)
BILIRUB INDIRECT FLD-MCNC: >0.1 MG/DL — SIGNIFICANT CHANGE UP (ref 0–1)
BILIRUB SERPL-MCNC: 0.3 MG/DL — SIGNIFICANT CHANGE UP (ref 0.2–1.2)
BUN SERPL-MCNC: 8 MG/DL — SIGNIFICANT CHANGE UP (ref 7–23)
CALCIUM SERPL-MCNC: 9 MG/DL — SIGNIFICANT CHANGE UP (ref 8.4–10.5)
CHLORIDE SERPL-SCNC: 104 MMOL/L — SIGNIFICANT CHANGE UP (ref 98–107)
CO2 SERPL-SCNC: 25 MMOL/L — SIGNIFICANT CHANGE UP (ref 22–31)
CREAT SERPL-MCNC: 0.85 MG/DL — SIGNIFICANT CHANGE UP (ref 0.5–1.3)
CULTURE RESULTS: NO GROWTH — SIGNIFICANT CHANGE UP
CULTURE RESULTS: NO GROWTH — SIGNIFICANT CHANGE UP
EGFR: 101 ML/MIN/1.73M2 — SIGNIFICANT CHANGE UP
EGFR: 101 ML/MIN/1.73M2 — SIGNIFICANT CHANGE UP
EOSINOPHIL # BLD AUTO: 0.18 K/UL — SIGNIFICANT CHANGE UP (ref 0–0.5)
EOSINOPHIL NFR BLD AUTO: 2.7 % — SIGNIFICANT CHANGE UP (ref 0–6)
GLUCOSE SERPL-MCNC: 98 MG/DL — SIGNIFICANT CHANGE UP (ref 70–99)
HCT VFR BLD CALC: 35.7 % — LOW (ref 39–50)
HGB BLD-MCNC: 11.9 G/DL — LOW (ref 13–17)
IANC: 3.27 K/UL — SIGNIFICANT CHANGE UP (ref 1.8–7.4)
IMM GRANULOCYTES NFR BLD AUTO: 0.3 % — SIGNIFICANT CHANGE UP (ref 0–0.9)
LYMPHOCYTES # BLD AUTO: 2.26 K/UL — SIGNIFICANT CHANGE UP (ref 1–3.3)
LYMPHOCYTES # BLD AUTO: 33.7 % — SIGNIFICANT CHANGE UP (ref 13–44)
MAGNESIUM SERPL-MCNC: 1.8 MG/DL — SIGNIFICANT CHANGE UP (ref 1.6–2.6)
MCHC RBC-ENTMCNC: 29.8 PG — SIGNIFICANT CHANGE UP (ref 27–34)
MCHC RBC-ENTMCNC: 33.3 G/DL — SIGNIFICANT CHANGE UP (ref 32–36)
MCV RBC AUTO: 89.5 FL — SIGNIFICANT CHANGE UP (ref 80–100)
MONOCYTES # BLD AUTO: 0.94 K/UL — HIGH (ref 0–0.9)
MONOCYTES NFR BLD AUTO: 14 % — SIGNIFICANT CHANGE UP (ref 2–14)
NEUTROPHILS # BLD AUTO: 3.27 K/UL — SIGNIFICANT CHANGE UP (ref 1.8–7.4)
NEUTROPHILS NFR BLD AUTO: 48.7 % — SIGNIFICANT CHANGE UP (ref 43–77)
NRBC # BLD AUTO: 0 K/UL — SIGNIFICANT CHANGE UP (ref 0–0)
NRBC # FLD: 0 K/UL — SIGNIFICANT CHANGE UP (ref 0–0)
NRBC BLD AUTO-RTO: 0 /100 WBCS — SIGNIFICANT CHANGE UP (ref 0–0)
PHOSPHATE SERPL-MCNC: 3.2 MG/DL — SIGNIFICANT CHANGE UP (ref 2.5–4.5)
PLATELET # BLD AUTO: 249 K/UL — SIGNIFICANT CHANGE UP (ref 150–400)
POTASSIUM SERPL-MCNC: 3.4 MMOL/L — LOW (ref 3.5–5.3)
POTASSIUM SERPL-SCNC: 3.4 MMOL/L — LOW (ref 3.5–5.3)
PROT SERPL-MCNC: 6.1 G/DL — SIGNIFICANT CHANGE UP (ref 6–8.3)
RBC # BLD: 3.99 M/UL — LOW (ref 4.2–5.8)
RBC # FLD: 13.9 % — SIGNIFICANT CHANGE UP (ref 10.3–14.5)
SODIUM SERPL-SCNC: 138 MMOL/L — SIGNIFICANT CHANGE UP (ref 135–145)
SPECIMEN SOURCE: SIGNIFICANT CHANGE UP
SPECIMEN SOURCE: SIGNIFICANT CHANGE UP
WBC # BLD: 6.71 K/UL — SIGNIFICANT CHANGE UP (ref 3.8–10.5)
WBC # FLD AUTO: 6.71 K/UL — SIGNIFICANT CHANGE UP (ref 3.8–10.5)

## 2025-05-11 PROCEDURE — 74018 RADEX ABDOMEN 1 VIEW: CPT | Mod: 26

## 2025-05-11 PROCEDURE — 99233 SBSQ HOSP IP/OBS HIGH 50: CPT

## 2025-05-11 RX ORDER — DIATRIZOATE MEGLUMINE, SODIUM 66 %-10 %
90 VIAL (ML) INJECTION ONCE
Refills: 0 | Status: COMPLETED | OUTPATIENT
Start: 2025-05-11 | End: 2025-05-11

## 2025-05-11 RX ADMIN — POLYETHYLENE GLYCOL 3350 17 GRAM(S): 17 POWDER, FOR SOLUTION ORAL at 11:38

## 2025-05-11 RX ADMIN — Medication 5 MILLIGRAM(S): at 23:23

## 2025-05-11 RX ADMIN — Medication 1 TABLET(S): at 11:39

## 2025-05-11 RX ADMIN — Medication 2 TABLET(S): at 11:39

## 2025-05-11 RX ADMIN — METOPROLOL SUCCINATE 100 MILLIGRAM(S): 50 TABLET, EXTENDED RELEASE ORAL at 05:58

## 2025-05-11 RX ADMIN — Medication 25 GRAM(S): at 05:57

## 2025-05-11 RX ADMIN — Medication 90 MILLILITER(S): at 17:29

## 2025-05-11 RX ADMIN — SODIUM CHLORIDE 100 MILLILITER(S): 9 INJECTION, SOLUTION INTRAVENOUS at 11:37

## 2025-05-11 RX ADMIN — Medication 25 GRAM(S): at 14:39

## 2025-05-11 RX ADMIN — BICTEGRAVIR SODIUM, EMTRICITABINE, AND TENOFOVIR ALAFENAMIDE FUMARATE 1 TABLET(S): 50; 200; 25 TABLET ORAL at 11:40

## 2025-05-11 RX ADMIN — SODIUM CHLORIDE 100 MILLILITER(S): 9 INJECTION, SOLUTION INTRAVENOUS at 06:03

## 2025-05-11 RX ADMIN — Medication 50 MILLIGRAM(S): at 22:11

## 2025-05-11 RX ADMIN — EZETIMIBE 10 MILLIGRAM(S): 10 TABLET ORAL at 22:12

## 2025-05-11 RX ADMIN — Medication 25 GRAM(S): at 22:12

## 2025-05-11 RX ADMIN — Medication 40 MILLIEQUIVALENT(S): at 09:37

## 2025-05-11 RX ADMIN — GABAPENTIN 300 MILLIGRAM(S): 400 CAPSULE ORAL at 22:12

## 2025-05-11 RX ADMIN — DILTIAZEM HYDROCHLORIDE 240 MILLIGRAM(S): 120 CAPSULE, EXTENDED RELEASE ORAL at 05:58

## 2025-05-11 RX ADMIN — GABAPENTIN 300 MILLIGRAM(S): 400 CAPSULE ORAL at 14:36

## 2025-05-11 RX ADMIN — BUPROPION HYDROBROMIDE 150 MILLIGRAM(S): 522 TABLET, EXTENDED RELEASE ORAL at 11:39

## 2025-05-11 RX ADMIN — SODIUM CHLORIDE 100 MILLILITER(S): 9 INJECTION, SOLUTION INTRAVENOUS at 22:12

## 2025-05-11 RX ADMIN — GABAPENTIN 300 MILLIGRAM(S): 400 CAPSULE ORAL at 05:57

## 2025-05-11 NOTE — PHYSICAL THERAPY INITIAL EVALUATION ADULT - ADDITIONAL COMMENTS
Pt. reports he lives in a 1st floor apartment with wife which has 1 step to enter.  Prior to admission Pt was independent with all mobility and ambulated without an assistive device but owns a cane and rolling walker at home. Pt denies any recent falls.     Pt. left comfortable laying in bed post PT Evaluation, no apparent distress, call bell in reach, all lines intact. HA Dillon made aware of pt. status and participation in PT.

## 2025-05-11 NOTE — PROVIDER CONTACT NOTE (OTHER) - REASON
pt is in 7/10 pain was given half dose of oxy and it did not work
pt /94
pt is saying his left flank bothers him more and that nephrostomy tube urine is blood tinged

## 2025-05-11 NOTE — CHART NOTE - NSCHARTNOTEFT_GEN_A_CORE
60 y/o male with a hx of anal Cancer/prostate cancer colostomy c/b Parastomal hernia obstruction with bilateral hydronephrosis managed with bilateral PCNUs s/p exchange yesterday. Patient with c/o soreness at left PCNU insertion site.     Abd: Bilateral PCNU dressings c/d/i. No drainage noted.    Right PCNU leg bag with clear yellow urine.    Left PCNU leg bag with blood tinged drainage.     Left PCNU flushed with 10cc normal saline - drainage noted s/p flush.    Will continue to monitor

## 2025-05-11 NOTE — PROVIDER CONTACT NOTE (OTHER) - BACKGROUND
pt came in for complications with his nephrostomy tubes
pt came in for complications with his nephrostomy tubes
other mechanical complications of other urinary catheter, initial encounter.

## 2025-05-11 NOTE — PROVIDER CONTACT NOTE (OTHER) - SITUATION
pt is in 7/10 pain was given half dose of oxy and it did not work
pt received from PACU. Patient received HTN medication and pain medication in PACU to help with blood pressure.
pt is saying his left flank bothers him more and that nephrostomy tube urine is blood tinged

## 2025-05-11 NOTE — PHYSICAL THERAPY INITIAL EVALUATION ADULT - GENERAL OBSERVATIONS, REHAB EVAL
Consult received, chart reviewed. Patient received in bed semi-supine, no apparent distress, +nephrostomy tubes, + colostomy bag, HR 82. Pt. agreeable to participate in PT.

## 2025-05-11 NOTE — PROVIDER CONTACT NOTE (OTHER) - ASSESSMENT
pt is A&ox4 says his left flank is more uncomfortable, sit is clean dry and intact, tender to touch and urine draining is blood tinged
pt awake, alert, mental status at baseline. No acute s.s of distress noted. pt received from PACU after nephrostomy exchange in IR. No bleeding seen or pain reported by patient.
pt is A&ox4 c/o of pain in abdomen and flanks saying pain is throbbing and the 2.5 of oxy did not work

## 2025-05-11 NOTE — PROVIDER CONTACT NOTE (OTHER) - RECOMMENDATIONS
he is not asking for pain medications at this time
he is asking for another dose can you put in a one time dose ?
notify provider

## 2025-05-12 LAB
ALBUMIN SERPL ELPH-MCNC: 3.2 G/DL — LOW (ref 3.3–5)
ALP SERPL-CCNC: 75 U/L — SIGNIFICANT CHANGE UP (ref 40–120)
ALT FLD-CCNC: 19 U/L — SIGNIFICANT CHANGE UP (ref 4–41)
ANION GAP SERPL CALC-SCNC: 7 MMOL/L — SIGNIFICANT CHANGE UP (ref 7–14)
AST SERPL-CCNC: 19 U/L — SIGNIFICANT CHANGE UP (ref 4–40)
BASOPHILS # BLD AUTO: 0.04 K/UL — SIGNIFICANT CHANGE UP (ref 0–0.2)
BASOPHILS NFR BLD AUTO: 0.7 % — SIGNIFICANT CHANGE UP (ref 0–2)
BILIRUB DIRECT SERPL-MCNC: <0.2 MG/DL — SIGNIFICANT CHANGE UP (ref 0–0.3)
BILIRUB INDIRECT FLD-MCNC: >0 MG/DL — SIGNIFICANT CHANGE UP (ref 0–1)
BILIRUB SERPL-MCNC: 0.2 MG/DL — SIGNIFICANT CHANGE UP (ref 0.2–1.2)
BUN SERPL-MCNC: 10 MG/DL — SIGNIFICANT CHANGE UP (ref 7–23)
CALCIUM SERPL-MCNC: 9 MG/DL — SIGNIFICANT CHANGE UP (ref 8.4–10.5)
CHLORIDE SERPL-SCNC: 105 MMOL/L — SIGNIFICANT CHANGE UP (ref 98–107)
CO2 SERPL-SCNC: 27 MMOL/L — SIGNIFICANT CHANGE UP (ref 22–31)
CREAT SERPL-MCNC: 0.85 MG/DL — SIGNIFICANT CHANGE UP (ref 0.5–1.3)
EGFR: 101 ML/MIN/1.73M2 — SIGNIFICANT CHANGE UP
EGFR: 101 ML/MIN/1.73M2 — SIGNIFICANT CHANGE UP
EOSINOPHIL # BLD AUTO: 0.24 K/UL — SIGNIFICANT CHANGE UP (ref 0–0.5)
EOSINOPHIL NFR BLD AUTO: 4.3 % — SIGNIFICANT CHANGE UP (ref 0–6)
GLUCOSE SERPL-MCNC: 111 MG/DL — HIGH (ref 70–99)
HCT VFR BLD CALC: 36 % — LOW (ref 39–50)
HGB BLD-MCNC: 11.8 G/DL — LOW (ref 13–17)
IANC: 2.52 K/UL — SIGNIFICANT CHANGE UP (ref 1.8–7.4)
IMM GRANULOCYTES NFR BLD AUTO: 0.5 % — SIGNIFICANT CHANGE UP (ref 0–0.9)
LYMPHOCYTES # BLD AUTO: 2.04 K/UL — SIGNIFICANT CHANGE UP (ref 1–3.3)
LYMPHOCYTES # BLD AUTO: 36.8 % — SIGNIFICANT CHANGE UP (ref 13–44)
MAGNESIUM SERPL-MCNC: 1.9 MG/DL — SIGNIFICANT CHANGE UP (ref 1.6–2.6)
MCHC RBC-ENTMCNC: 29.1 PG — SIGNIFICANT CHANGE UP (ref 27–34)
MCHC RBC-ENTMCNC: 32.8 G/DL — SIGNIFICANT CHANGE UP (ref 32–36)
MCV RBC AUTO: 88.7 FL — SIGNIFICANT CHANGE UP (ref 80–100)
MONOCYTES # BLD AUTO: 0.68 K/UL — SIGNIFICANT CHANGE UP (ref 0–0.9)
MONOCYTES NFR BLD AUTO: 12.3 % — SIGNIFICANT CHANGE UP (ref 2–14)
NEUTROPHILS # BLD AUTO: 2.52 K/UL — SIGNIFICANT CHANGE UP (ref 1.8–7.4)
NEUTROPHILS NFR BLD AUTO: 45.4 % — SIGNIFICANT CHANGE UP (ref 43–77)
NRBC # BLD AUTO: 0 K/UL — SIGNIFICANT CHANGE UP (ref 0–0)
NRBC # FLD: 0 K/UL — SIGNIFICANT CHANGE UP (ref 0–0)
NRBC BLD AUTO-RTO: 0 /100 WBCS — SIGNIFICANT CHANGE UP (ref 0–0)
PHOSPHATE SERPL-MCNC: 3.1 MG/DL — SIGNIFICANT CHANGE UP (ref 2.5–4.5)
PLATELET # BLD AUTO: 252 K/UL — SIGNIFICANT CHANGE UP (ref 150–400)
POTASSIUM SERPL-MCNC: 3.6 MMOL/L — SIGNIFICANT CHANGE UP (ref 3.5–5.3)
POTASSIUM SERPL-SCNC: 3.6 MMOL/L — SIGNIFICANT CHANGE UP (ref 3.5–5.3)
PROT SERPL-MCNC: 5.9 G/DL — LOW (ref 6–8.3)
RBC # BLD: 4.06 M/UL — LOW (ref 4.2–5.8)
RBC # FLD: 13.5 % — SIGNIFICANT CHANGE UP (ref 10.3–14.5)
SODIUM SERPL-SCNC: 139 MMOL/L — SIGNIFICANT CHANGE UP (ref 135–145)
WBC # BLD: 5.55 K/UL — SIGNIFICANT CHANGE UP (ref 3.8–10.5)
WBC # FLD AUTO: 5.55 K/UL — SIGNIFICANT CHANGE UP (ref 3.8–10.5)

## 2025-05-12 PROCEDURE — 99232 SBSQ HOSP IP/OBS MODERATE 35: CPT

## 2025-05-12 RX ADMIN — GABAPENTIN 300 MILLIGRAM(S): 400 CAPSULE ORAL at 05:17

## 2025-05-12 RX ADMIN — GABAPENTIN 300 MILLIGRAM(S): 400 CAPSULE ORAL at 14:52

## 2025-05-12 RX ADMIN — Medication 1 TABLET(S): at 12:31

## 2025-05-12 RX ADMIN — Medication 25 GRAM(S): at 21:56

## 2025-05-12 RX ADMIN — METOPROLOL SUCCINATE 100 MILLIGRAM(S): 50 TABLET, EXTENDED RELEASE ORAL at 05:17

## 2025-05-12 RX ADMIN — Medication 50 MILLIGRAM(S): at 21:56

## 2025-05-12 RX ADMIN — SODIUM CHLORIDE 100 MILLILITER(S): 9 INJECTION, SOLUTION INTRAVENOUS at 16:58

## 2025-05-12 RX ADMIN — GABAPENTIN 300 MILLIGRAM(S): 400 CAPSULE ORAL at 21:56

## 2025-05-12 RX ADMIN — EZETIMIBE 10 MILLIGRAM(S): 10 TABLET ORAL at 21:56

## 2025-05-12 RX ADMIN — BICTEGRAVIR SODIUM, EMTRICITABINE, AND TENOFOVIR ALAFENAMIDE FUMARATE 1 TABLET(S): 50; 200; 25 TABLET ORAL at 12:31

## 2025-05-12 RX ADMIN — Medication 5 MILLIGRAM(S): at 23:14

## 2025-05-12 RX ADMIN — Medication 40 MILLIEQUIVALENT(S): at 16:58

## 2025-05-12 RX ADMIN — Medication 25 GRAM(S): at 14:53

## 2025-05-12 RX ADMIN — DILTIAZEM HYDROCHLORIDE 240 MILLIGRAM(S): 120 CAPSULE, EXTENDED RELEASE ORAL at 05:17

## 2025-05-12 RX ADMIN — SODIUM CHLORIDE 100 MILLILITER(S): 9 INJECTION, SOLUTION INTRAVENOUS at 07:25

## 2025-05-12 RX ADMIN — BUPROPION HYDROBROMIDE 150 MILLIGRAM(S): 522 TABLET, EXTENDED RELEASE ORAL at 13:08

## 2025-05-12 RX ADMIN — Medication 40 MILLIEQUIVALENT(S): at 12:30

## 2025-05-12 RX ADMIN — Medication 25 GRAM(S): at 05:16

## 2025-05-12 NOTE — PROGRESS NOTE ADULT - TIME BILLING
Time-based billing (NON-critical care).     52 minutes spent on total encounter; more than 50% of the visit was spent counseling and / or coordinating care by the attending physician.  The necessity of the time spent during the encounter on this date of service was due to:     review of laboratory data, radiology results, consultants' recommendations, documentation in [place], discussion with patient and interdisciplinary staff (such as , social workers, etc). Interventions were performed as documented above.

## 2025-05-13 ENCOUNTER — TRANSCRIPTION ENCOUNTER (OUTPATIENT)
Age: 60
End: 2025-05-13

## 2025-05-13 VITALS
TEMPERATURE: 99 F | SYSTOLIC BLOOD PRESSURE: 150 MMHG | OXYGEN SATURATION: 98 % | RESPIRATION RATE: 18 BRPM | HEART RATE: 90 BPM | DIASTOLIC BLOOD PRESSURE: 97 MMHG

## 2025-05-13 LAB
ANION GAP SERPL CALC-SCNC: 11 MMOL/L — SIGNIFICANT CHANGE UP (ref 7–14)
BUN SERPL-MCNC: 10 MG/DL — SIGNIFICANT CHANGE UP (ref 7–23)
CALCIUM SERPL-MCNC: 9 MG/DL — SIGNIFICANT CHANGE UP (ref 8.4–10.5)
CHLORIDE SERPL-SCNC: 107 MMOL/L — SIGNIFICANT CHANGE UP (ref 98–107)
CO2 SERPL-SCNC: 23 MMOL/L — SIGNIFICANT CHANGE UP (ref 22–31)
CREAT SERPL-MCNC: 0.87 MG/DL — SIGNIFICANT CHANGE UP (ref 0.5–1.3)
CULTURE RESULTS: NO GROWTH — SIGNIFICANT CHANGE UP
EGFR: 100 ML/MIN/1.73M2 — SIGNIFICANT CHANGE UP
EGFR: 100 ML/MIN/1.73M2 — SIGNIFICANT CHANGE UP
GLUCOSE SERPL-MCNC: 115 MG/DL — HIGH (ref 70–99)
HCT VFR BLD CALC: 36.7 % — LOW (ref 39–50)
HGB BLD-MCNC: 12.1 G/DL — LOW (ref 13–17)
HIV-1 VIRAL LOAD RESULT: SIGNIFICANT CHANGE UP
HIV1 RNA # SERPL NAA+PROBE: SIGNIFICANT CHANGE UP COPIES/ML
HIV1 RNA SER-IMP: SIGNIFICANT CHANGE UP
HIV1 RNA SERPL NAA+PROBE-ACNC: SIGNIFICANT CHANGE UP
HIV1 RNA SERPL NAA+PROBE-LOG#: SIGNIFICANT CHANGE UP LG COP/ML
MAGNESIUM SERPL-MCNC: 2 MG/DL — SIGNIFICANT CHANGE UP (ref 1.6–2.6)
MCHC RBC-ENTMCNC: 29.3 PG — SIGNIFICANT CHANGE UP (ref 27–34)
MCHC RBC-ENTMCNC: 33 G/DL — SIGNIFICANT CHANGE UP (ref 32–36)
MCV RBC AUTO: 88.9 FL — SIGNIFICANT CHANGE UP (ref 80–100)
NRBC # BLD AUTO: 0 K/UL — SIGNIFICANT CHANGE UP (ref 0–0)
NRBC # FLD: 0 K/UL — SIGNIFICANT CHANGE UP (ref 0–0)
NRBC BLD AUTO-RTO: 0 /100 WBCS — SIGNIFICANT CHANGE UP (ref 0–0)
PHOSPHATE SERPL-MCNC: 2.6 MG/DL — SIGNIFICANT CHANGE UP (ref 2.5–4.5)
PLATELET # BLD AUTO: 288 K/UL — SIGNIFICANT CHANGE UP (ref 150–400)
POTASSIUM SERPL-MCNC: 3.9 MMOL/L — SIGNIFICANT CHANGE UP (ref 3.5–5.3)
POTASSIUM SERPL-SCNC: 3.9 MMOL/L — SIGNIFICANT CHANGE UP (ref 3.5–5.3)
RBC # BLD: 4.13 M/UL — LOW (ref 4.2–5.8)
RBC # FLD: 13.4 % — SIGNIFICANT CHANGE UP (ref 10.3–14.5)
SODIUM SERPL-SCNC: 141 MMOL/L — SIGNIFICANT CHANGE UP (ref 135–145)
SPECIMEN SOURCE: SIGNIFICANT CHANGE UP
WBC # BLD: 5.49 K/UL — SIGNIFICANT CHANGE UP (ref 3.8–10.5)
WBC # FLD AUTO: 5.49 K/UL — SIGNIFICANT CHANGE UP (ref 3.8–10.5)

## 2025-05-13 PROCEDURE — 99239 HOSP IP/OBS DSCHRG MGMT >30: CPT

## 2025-05-13 RX ORDER — LACTULOSE 10 G/15ML
1 SOLUTION ORAL
Qty: 1 | Refills: 0
Start: 2025-05-13 | End: 2025-06-11

## 2025-05-13 RX ORDER — POLYETHYLENE GLYCOL 3350 17 G/17G
17 POWDER, FOR SOLUTION ORAL
Qty: 510 | Refills: 0
Start: 2025-05-13 | End: 2025-06-11

## 2025-05-13 RX ORDER — B1/B2/B3/B5/B6/B12/VIT C/FOLIC 500-0.5 MG
1 TABLET ORAL
Qty: 30 | Refills: 0
Start: 2025-05-13 | End: 2025-06-11

## 2025-05-13 RX ORDER — SENNA 187 MG
2 TABLET ORAL
Qty: 60 | Refills: 0
Start: 2025-05-13 | End: 2025-06-11

## 2025-05-13 RX ORDER — CEFPODOXIME PROXETIL 200 MG/1
1 TABLET, FILM COATED ORAL
Qty: 14 | Refills: 0
Start: 2025-05-13 | End: 2025-05-19

## 2025-05-13 RX ADMIN — METOPROLOL SUCCINATE 100 MILLIGRAM(S): 50 TABLET, EXTENDED RELEASE ORAL at 05:12

## 2025-05-13 RX ADMIN — BICTEGRAVIR SODIUM, EMTRICITABINE, AND TENOFOVIR ALAFENAMIDE FUMARATE 1 TABLET(S): 50; 200; 25 TABLET ORAL at 12:35

## 2025-05-13 RX ADMIN — Medication 25 GRAM(S): at 13:20

## 2025-05-13 RX ADMIN — Medication 1 TABLET(S): at 12:35

## 2025-05-13 RX ADMIN — Medication 25 GRAM(S): at 05:11

## 2025-05-13 RX ADMIN — BUPROPION HYDROBROMIDE 150 MILLIGRAM(S): 522 TABLET, EXTENDED RELEASE ORAL at 12:35

## 2025-05-13 RX ADMIN — DILTIAZEM HYDROCHLORIDE 240 MILLIGRAM(S): 120 CAPSULE, EXTENDED RELEASE ORAL at 05:11

## 2025-05-13 RX ADMIN — GABAPENTIN 300 MILLIGRAM(S): 400 CAPSULE ORAL at 13:20

## 2025-05-13 RX ADMIN — GABAPENTIN 300 MILLIGRAM(S): 400 CAPSULE ORAL at 05:12

## 2025-05-13 NOTE — DISCHARGE NOTE PROVIDER - CARE PROVIDERS DIRECT ADDRESSES
[email or web address]

## 2025-05-13 NOTE — DISCHARGE NOTE PROVIDER - NSDCFUSCHEDAPPT_GEN_ALL_CORE_FT
Neena Boston  [facility] Physician [facility]  TRAUMASURG OP [id number]  Scheduled Appointment: 05/22/2025    Marii Becerra  [facility]  INFDISEASE [address]  Scheduled Appointment: 05/23/2025    Adonis Layne  [facility] Physician [facility]  CARDIOLOGY [address]  Scheduled Appointment: 07/07/2025    Denise Gordon  [facility]  INFDISEASE [address]  Scheduled Appointment: 07/24/2025

## 2025-05-13 NOTE — DISCHARGE NOTE PROVIDER - NSDCCPCAREPLAN_GEN_ALL_CORE_FT
PRINCIPAL DISCHARGE DIAGNOSIS  Diagnosis: Malfunction of nephrostomy tube  Assessment and Plan of Treatment: You were admitted due to a urinary infection related to a malfunction of your nephrostomy tubes. They were replaced by interventional radiology. Please continue to flush your tubes daily.  You will complete antibiotics by mouth with cefpodoxime until May 20th 2025.   Please follow up with your oncologist and primary care physician.

## 2025-05-13 NOTE — PROGRESS NOTE ADULT - PROBLEM SELECTOR PLAN 5
Hx of anal cancer in 2020 s/p chemo, radiation, and post-radiation complications requiring ostomy.   Follows with Dr. Vanegas in [place].   In remission.  - CT A/P with IVC filter  Should discuss IVC filter removal as outpatient if feasible.
Hx of anal cancer in 2020 s/p chemo, radiation, and post-radiation complications requiring ostomy.   Follows with Dr. Vanegas in [place].   In remission.  - CT A/P with IVC filter
Hx of anal cancer in 2020 s/p chemo, radiation, and post-radiation complications requiring ostomy.   Follows with Dr. Vanegas in [place].   In remission.  - CT A/P with IVC filter    Plan:  - outpatient follow up for cancer and to discuss IVC filter
Hx of anal cancer in 2020 s/p chemo, radiation, and post-radiation complications requiring ostomy.   Follows with Dr. Vanegas in [place].   In remission.  - CT A/P with IVC filter  Should discuss IVC filter removal as outpatient if feasible.

## 2025-05-13 NOTE — DISCHARGE NOTE PROVIDER - HOSPITAL COURSE
Patient is a 58yo M with PMH of HIV, prostate cancer, anal cancer (s/p chemotherapy, radiation, c/b ostomy creation and b/l nephrostomy tubes), anxiety/depression, HTN, HLD, and BPH who presented with inability to flush b/l nephrostomy tubes. They were replaced by interventional radiology. He was also noted to have fever/sepsis due to complicated UTI in the setting of the b/l nephrostomy tubes. He was kept on IV antibiotics and demonstrated improvement. Urine cultures demonstrated no growth. He will complete treatment with oral 3rd generation cephalosporins based on sensitivities from prior cultures from April (Providencia, Proteus) to complete 10 days of IV antibiotics post-tube exchange.     There was some concern for obstruction due to lack of stool output from his colostomy. This resolved spontaneously. No evidence of obstruction was seen on imaging. General surgery evaluated him.     He is hemodynamically stable and medically ready for discharge to home.

## 2025-05-13 NOTE — DISCHARGE NOTE NURSING/CASE MANAGEMENT/SOCIAL WORK - FINANCIAL ASSISTANCE
[facility] provides services at a reduced cost to those who are determined to be eligible through [facility]’s financial assistance program. Information regarding [facility]’s financial assistance program can be found by going to [email or web address] or by calling [phone].

## 2025-05-13 NOTE — PROGRESS NOTE ADULT - PROBLEM SELECTOR PLAN 6
Hx of anxiety and depression.  - Home meds: Buproprion  mg, Clonazepam 1 mg    Plan:  - c/w home Buproprion  - c/w home clonazepam

## 2025-05-13 NOTE — DISCHARGE NOTE NURSING/CASE MANAGEMENT/SOCIAL WORK - NSDCPNINST_GEN_ALL_CORE
Bilateral nephrostomy tube.  Keep sites clean dry and intact. Monitor for drainage, redness and pain.

## 2025-05-13 NOTE — PROGRESS NOTE ADULT - GASTROINTESTINAL
soft/nontender/nondistended/normal active bowel sounds
soft/nontender/nondistended/normal active bowel sounds

## 2025-05-13 NOTE — PROGRESS NOTE ADULT - PROBLEM SELECTOR PLAN 3
Recent hernia repair, ~3 weeks ago with Dr. Boston.   Was prescribed flexeril, gabapentin, and dilaudid for pain.   Has been having decreased ostomy output.     Plan:  surgery to see as still no stool in bag, hypoactive BS  no obstruction noted on CT  - pain: Tylenol prn for mild, oxycodone 2.5 mg for moderate, and 5 mg for severe pain.  - c/w gabapentin  - starting miralax/senna
Recent hernia repair, ~3 weeks ago with Dr. Boston.   Was prescribed flexeril, gabapentin, and dilaudid for pain.   Has been having decreased ostomy output.     Plan:  surgery to see as still no stool in bag, hypoactive BS  no obstruction noted on CT  - pain: Tylenol prn for mild, oxycodone 2.5 mg for moderate, and 5 mg for severe pain.  - c/w gabapentin  - starting miralax/senna  surg recommend gastrografin study for diagnostic and therapeutic treatment
Recent hernia repair, ~3 weeks ago with Dr. Boston.   Was prescribed flexeril, gabapentin, and dilaudid for pain.   Has been having decreased ostomy output, now resolved.    Plan:  no obstruction noted on CT  - pain: Tylenol prn for mild, oxycodone 2.5 mg for moderate, and 5 mg for severe pain.  - c/w gabapentin  - starting miralax/senna  Now with normal stool output. Surgery signed off.
Recent hernia repair, ~3 weeks ago with Dr. Boston.   Was prescribed flexeril, gabapentin, and dilaudid for pain.   Has been having decreased ostomy output, now resolved.    Plan:  no obstruction noted on CT  - pain: Tylenol prn for mild, oxycodone 2.5 mg for moderate, and 5 mg for severe pain.  - c/w gabapentin  - starting miralax/senna  Now with normal stool output. Surgery signed off.

## 2025-05-13 NOTE — PROGRESS NOTE ADULT - ASSESSMENT
59M PMH HIV, prostate cancer, anal cancer (s/p chemo/radiation and APR and bilateral nephrostomy tubes), anxiety/depression, HTN, HLD, BPH, PSH APR and recent parastomal hernia repair w mesh) presenting with hydronephrosis and nephrostomy tube not draining/flushing. S/p IR nephro tube exchange. General surgery consulted for constipation in setting of recent parastomal hernia surgery. Pt now with significant stoma output.     Recs:  -Continue bowel regimen   -Surgery to s/o  -Reconsult PRN  -Care per primary team     B Team  [id number]
David Turcios is a 59-year-old male with a past medical history of HIV, prostate cancer, anal cancer (s/p chemo/radiation c/b ostomy creation and bilateral nephrostomy tubes), anxiety/depression, HTN, HLD, BPH presenting with hydronephrosis, poor nephrostomy tube drainage, and sepsis due to UTI

## 2025-05-13 NOTE — DISCHARGE NOTE NURSING/CASE MANAGEMENT/SOCIAL WORK - PATIENT PORTAL LINK FT
You can access the FollowMyHealth Patient Portal offered by [facility] by registering at the following website: [email or web address]. By joining [employer]’s FollowMyHealth portal, you will also be able to view your health information using other applications (apps) compatible with our system.

## 2025-05-13 NOTE — PROGRESS NOTE ADULT - PROBLEM SELECTOR PLAN 2
CT A/P with moderate to severe bilateral hydronephrosis in the setting of bilateral poorly draining nephrostomy tubes.    Plan:  - IR procedure performed on 05/10, nephrostomy tubes replaced
CT A/P with moderate to severe bilateral hydronephrosis in the setting of bilateral poorly draining nephrostomy tubes.    Plan:  - IR procedure on 05/10
CT A/P with moderate to severe bilateral hydronephrosis in the setting of bilateral poorly draining nephrostomy tubes.    Plan:  - IR procedure on 05/10
CT A/P with moderate to severe bilateral hydronephrosis in the setting of bilateral poorly draining nephrostomy tubes.    Plan:  - IR procedure performed on 05/10, nephrostomy tubes replaced

## 2025-05-13 NOTE — DISCHARGE NOTE NURSING/CASE MANAGEMENT/SOCIAL WORK - NSDCPEFALRISK_GEN_ALL_CORE
For information on Fall & Injury Prevention, visit: [email or web address] OR  [email or web address] OR  https://www.cdc.gov/steadi/patient.html

## 2025-05-13 NOTE — DISCHARGE NOTE PROVIDER - CARE PROVIDER_API CALL
Jarad Schwartz (DO)  Family Medicine  [address], [address]  [place] [address]  Phone: [phone]  Fax: [phone]  Follow Up Time:     Andres Smith  Medical Oncology  [address] [place] [id number]  Phone: [phone]  Fax: [phone]  Follow Up Time:

## 2025-05-13 NOTE — PROGRESS NOTE ADULT - SUBJECTIVE AND OBJECTIVE BOX
24h Events:  No acute events overnight.    Subjective:   Patient seen at bedside this AM. Reports he had two large "BMs" from the stoma last night and feels much better. Denies abdominal pain, nausea, vomiting, fevers, chills.     Objective:  Vital Signs  T(C): 36.8 (05-12 @ 05:10), Max: 37.2 (05-11 @ 13:20)  HR: 81 (05-12 @ 05:10) (81 - 92)  BP: 125/90 (05-12 @ 05:10) (103/69 - 125/90)  RR: 19 (05-12 @ 05:10) (18 - 19)  SpO2: 96% (05-12 @ 05:10) (95% - 96%)  05-11-25 @ 07:01  -  05-12-25 @ 07:00  --------------------------------------------------------  IN:  Total IN: 0 mL    OUT:    Nephrostomy Tube (mL): 1700 mL    Nephrostomy Tube (mL): 1520 mL  Total OUT: 3220 mL    Total NET: -3220 mL          Physical Exam:  GEN: NAD  RESP: RA  ABD: s, nd, nttp, stool/air in ostomy bag  EXTR: WWP  NEURO: Awake/alert     Labs:                        11.8   5.55  )-----------( 252      ( 12 May 2025 06:30 )             36.0   05-12    139  |  105  |  10  ----------------------------<  111[H]  3.6   |  27  |  0.85    Ca    9.0      12 May 2025 06:30  Phos  3.1     05-12  Mg     1.90     05-12    TPro  5.9[L]  /  Alb  3.2[L]  /  TBili  0.2  /  DBili  <0.2  /  AST  19  /  ALT  19  /  AlkPhos  75  05-12    CAPILLARY BLOOD GLUCOSE          Imaging:    
[facility] Division of Hospital Medicine  Edna Dennison MD  Pager [phone]    Patient is a 59y old  Male who presents with a chief complaint of Hydronephrosis       SUBJECTIVE / OVERNIGHT EVENTS: s/p NT change yesterday; PM events noted; now b/l NT draining clear yellow urine; pt reports improved pain in abd; ate after procedure; still no stool      MEDICATIONS  (STANDING):  bictegravir 50 mG/emtricitabine 200 mG/tenofovir alafenamide 25 mG (BIKTARVY) 1 Tablet(s) Oral daily  buPROPion XL (24-Hour) . 150 milliGRAM(s) Oral daily  dextrose 5% + lactated ringers. 1000 milliLiter(s) (100 mL/Hr) IV Continuous <Continuous>  diltiazem    milliGRAM(s) Oral daily  ezetimibe 10 milliGRAM(s) Oral at bedtime  gabapentin 300 milliGRAM(s) Oral every 8 hours  metoprolol succinate  milliGRAM(s) Oral daily  multivitamin 1 Tablet(s) Oral daily  piperacillin/tazobactam IVPB.. 3.375 Gram(s) IV Intermittent every 8 hours  polyethylene glycol 3350 17 Gram(s) Oral daily  senna 2 Tablet(s) Oral daily  traZODone 50 milliGRAM(s) Oral at bedtime    MEDICATIONS  (PRN):  acetaminophen     Tablet .. 650 milliGRAM(s) Oral every 6 hours PRN Temp greater or equal to 38C (100.4F), Mild Pain (1 - 3)  clonazePAM  Tablet 1 milliGRAM(s) Oral two times a day PRN for anxiety  melatonin 3 milliGRAM(s) Oral at bedtime PRN Insomnia  oxyCODONE    IR 2.5 milliGRAM(s) Oral every 4 hours PRN Moderate Pain (4 - 6)  oxyCODONE    IR 5 milliGRAM(s) Oral every 4 hours PRN Severe Pain (7 - 10)  zolpidem 5 milliGRAM(s) Oral at bedtime PRN Insomnia  zolpidem 5 milliGRAM(s) Oral at bedtime PRN Insomnia      PHYSICAL EXAM:  Vital Signs Last 24 Hrs  T(F): 98.9 (11 May 2025 05:09), Max: 98.9 (11 May 2025 05:09)  HR: 82 (11 May 2025 05:09) (75 - 97)  BP: 118/82 (11 May 2025 05:09) (118/82 - 158/106)  RR: 18 (11 May 2025 05:09) (13 - 23)  SpO2: 97% (11 May 2025 05:09) (95% - 100%)    Parameters below as of 11 May 2025 05:09  Patient On (Oxygen Delivery Method): room air        CONSTITUTIONAL: NAD, appears comfortable  EYES: PERRLA; conjunctiva and sclera clear  ENMT: Moist oral mucosa; normal dentition  RESPIRATORY: Normal respiratory effort; grossly b/l AE  CARDIOVASCULAR: Regular rate and rhythm; No lower extremity edema  ABDOMEN: Nontender to palpation, normoactive bowel sounds; soft; + ostomy; bag empty  MUSCULOSKELETAL:  no clubbing or cyanosis of digits; no joint swelling or tenderness to palpation  PSYCH: A+O to person, place, and time; affect appropriate  NEUROLOGY: CN 2-12 are intact and symmetric; no gross sensory deficits   SKIN: No rashes; no palpable lesions; b/l NT draining clear yellow urine    LABS:                        11.9   6.71  )-----------( 249      ( 11 May 2025 05:40 )             35.7     05-11    138  |  104  |  8   ----------------------------<  98  3.4[L]   |  25  |  0.85    Ca    9.0      11 May 2025 05:40  Phos  3.2     05-11  Mg     1.80     05-11    TPro  6.1  /  Alb  3.3  /  TBili  0.3  /  DBili  <0.2  /  AST  8   /  ALT  6   /  AlkPhos  79  05-11          Culture - Fungal, Other (collected 10 May 2025 11:41)  Source: Other  Preliminary Report (11 May 2025 11:38):    Testing in progress    Culture - Fungal, Other (collected 10 May 2025 11:23)  Source: Other  Preliminary Report (11 May 2025 11:16):    Testing in progress    Urinalysis with Rflx Culture (collected 10 May 2025 07:09)    Culture - Urine (collected 10 May 2025 05:30)  Source: Clean Catch  Final Report (11 May 2025 11:39):    No growth    Culture - Blood (collected 10 May 2025 02:30)  Source: Blood Blood  Preliminary Report (11 May 2025 09:01):    No growth at 24 hours    Culture - Blood (collected 10 May 2025 02:15)  Source: Blood Blood  Preliminary Report (11 May 2025 09:01):    No growth at 24 hours    Urinalysis with Rflx Culture (collected 09 May 2025 16:26)    Culture - Urine (collected 09 May 2025 16:26)  Source: Clean Catch  Final Report (10 May 2025 23:28):    <10,000 CFU/mL Normal Urogenital Genet  
[facility] Division of Hospital Medicine  Edna Dennison MD  Pager [phone]    Patient is a 59y old  Male who presents with a chief complaint of Hydronephrosis     SUBJECTIVE / OVERNIGHT EVENTS: pt seen this AM; denies pain; plan for IR today with tube change; pt also reports no BM for 3-4 days, minimal gas. lower abd remains tender from surgery approx 3 weeks ago; no n/v; pain meds helping      MEDICATIONS  (STANDING):  bictegravir 50 mG/emtricitabine 200 mG/tenofovir alafenamide 25 mG (BIKTARVY) 1 Tablet(s) Oral daily  buPROPion XL (24-Hour) . 150 milliGRAM(s) Oral daily  dextrose 5% + lactated ringers. 1000 milliLiter(s) (100 mL/Hr) IV Continuous <Continuous>  diltiazem    milliGRAM(s) Oral daily  ezetimibe 10 milliGRAM(s) Oral at bedtime  gabapentin 300 milliGRAM(s) Oral every 8 hours  metoprolol succinate  milliGRAM(s) Oral daily  multivitamin 1 Tablet(s) Oral daily  piperacillin/tazobactam IVPB.. 3.375 Gram(s) IV Intermittent every 8 hours  polyethylene glycol 3350 17 Gram(s) Oral daily  senna 2 Tablet(s) Oral daily  traZODone 50 milliGRAM(s) Oral at bedtime    MEDICATIONS  (PRN):  acetaminophen     Tablet .. 650 milliGRAM(s) Oral every 6 hours PRN Temp greater or equal to 38C (100.4F), Mild Pain (1 - 3)  clonazePAM  Tablet 1 milliGRAM(s) Oral two times a day PRN for anxiety  melatonin 3 milliGRAM(s) Oral at bedtime PRN Insomnia  oxyCODONE    IR 2.5 milliGRAM(s) Oral every 4 hours PRN Moderate Pain (4 - 6)  oxyCODONE    IR 5 milliGRAM(s) Oral every 4 hours PRN Severe Pain (7 - 10)  zolpidem 5 milliGRAM(s) Oral at bedtime PRN Insomnia  zolpidem 5 milliGRAM(s) Oral at bedtime PRN Insomnia      PHYSICAL EXAM:  Vital Signs Last 24 Hrs  T(F): 98.4 (10 May 2025 05:19), Max: 98.9 (09 May 2025 19:38)  HR: 88 (10 May 2025 05:19) (87 - 96)  BP: 130/89 (10 May 2025 05:19) (130/89 - 157/92)  RR: 17 (10 May 2025 05:19) (16 - 18)  SpO2: 98% (10 May 2025 05:19) (98% - 99%)    Parameters below as of 09 May 2025 23:45  Patient On (Oxygen Delivery Method): room air        CONSTITUTIONAL: NAD, appears comfortable  EYES: PERRLA; conjunctiva and sclera clear  ENMT: Moist oral mucosa; normal dentition  RESPIRATORY: Normal respiratory effort; grossly b/l AE  CARDIOVASCULAR: Regular rate and rhythm; No lower extremity edema  ABDOMEN: Nontender to palpation, normoactive bowel sounds, soft, hypoactive BS, tender in area of stoma; empty bag  MUSCULOSKELETAL:  no clubbing or cyanosis of digits; no joint swelling or tenderness to palpation  PSYCH: A+O to person, place, and time; affect appropriate  NEUROLOGY: CN 2-12 are intact and symmetric; no gross sensory deficits   SKIN: No rashes; no palpable lesions    LABS:                        12.1   8.89  )-----------( 260      ( 10 May 2025 02:15 )             35.5     05-10    136  |  101  |  15  ----------------------------<  128[H]  3.6   |  23  |  0.85    Ca    9.0      10 May 2025 02:15  Phos  2.8     05-10  Mg     2.00     05-10    TPro  6.7  /  Alb  3.5  /  TBili  0.3  /  DBili  x   /  AST  12  /  ALT  7   /  AlkPhos  89  05-10    PT/INR - ( 10 May 2025 02:15 )   PT: 13.7 sec;   INR: 1.18 ratio         PTT - ( 10 May 2025 02:15 )  PTT:28.8 sec    
[facility] Division of Hospital Medicine  Luis DO Arelis  Available via MS Teams    SUBJECTIVE / OVERNIGHT EVENTS:  Overnight with colostomy output noted. Again this AM.  Patient states he feels much improved since the nephrostomy tubes were placed and his colostomy starting putting out again.    ADDITIONAL REVIEW OF SYSTEMS:    MEDICATIONS  (STANDING):  bictegravir 50 mG/emtricitabine 200 mG/tenofovir alafenamide 25 mG (BIKTARVY) 1 Tablet(s) Oral daily  buPROPion XL (24-Hour) . 150 milliGRAM(s) Oral daily  dextrose 5% + lactated ringers. 1000 milliLiter(s) (100 mL/Hr) IV Continuous <Continuous>  diltiazem    milliGRAM(s) Oral daily  ezetimibe 10 milliGRAM(s) Oral at bedtime  gabapentin 300 milliGRAM(s) Oral every 8 hours  metoprolol succinate  milliGRAM(s) Oral daily  multivitamin 1 Tablet(s) Oral daily  piperacillin/tazobactam IVPB.. 3.375 Gram(s) IV Intermittent every 8 hours  polyethylene glycol 3350 17 Gram(s) Oral daily  potassium chloride   Powder 40 milliEquivalent(s) Oral every 4 hours  senna 2 Tablet(s) Oral daily  traZODone 50 milliGRAM(s) Oral at bedtime    MEDICATIONS  (PRN):  acetaminophen     Tablet .. 650 milliGRAM(s) Oral every 6 hours PRN Temp greater or equal to 38C (100.4F), Mild Pain (1 - 3)  clonazePAM  Tablet 1 milliGRAM(s) Oral two times a day PRN for anxiety  melatonin 3 milliGRAM(s) Oral at bedtime PRN Insomnia  oxyCODONE    IR 2.5 milliGRAM(s) Oral every 4 hours PRN Moderate Pain (4 - 6)  oxyCODONE    IR 5 milliGRAM(s) Oral every 4 hours PRN Severe Pain (7 - 10)  zolpidem 5 milliGRAM(s) Oral at bedtime PRN Insomnia  zolpidem 5 milliGRAM(s) Oral at bedtime PRN Insomnia      I&O's Summary    11 May 2025 07:01  -  12 May 2025 07:00  --------------------------------------------------------  IN: 150 mL / OUT: 3220 mL / NET: -3070 mL    12 May 2025 07:01  -  12 May 2025 14:08  --------------------------------------------------------  IN: 0 mL / OUT: 1290 mL / NET: -1290 mL        PHYSICAL EXAM:  Vital Signs Last 24 Hrs  T(C): 36.8 (12 May 2025 05:10), Max: 37.2 (11 May 2025 21:35)  T(F): 98.3 (12 May 2025 05:10), Max: 99 (11 May 2025 21:35)  HR: 81 (12 May 2025 05:10) (81 - 92)  BP: 125/90 (12 May 2025 05:10) (117/81 - 125/90)  BP(mean): --  RR: 19 (12 May 2025 05:10) (19 - 19)  SpO2: 96% (12 May 2025 05:10) (96% - 96%)    Parameters below as of 12 May 2025 05:10  Patient On (Oxygen Delivery Method): room air    LABS:                        11.8   5.55  )-----------( 252      ( 12 May 2025 06:30 )             36.0     05-12    139  |  105  |  10  ----------------------------<  111[H]  3.6   |  27  |  0.85    Ca    9.0      12 May 2025 06:30  Phos  3.1     05-12  Mg     1.90     05-12    TPro  5.9[L]  /  Alb  3.2[L]  /  TBili  0.2  /  DBili  <0.2  /  AST  19  /  ALT  19  /  AlkPhos  75  05-12          Urinalysis Basic - ( 12 May 2025 06:30 )    Color: x / Appearance: x / SG: x / pH: x  Gluc: 111 mg/dL / Ketone: x  / Bili: x / Urobili: x   Blood: x / Protein: x / Nitrite: x   Leuk Esterase: x / RBC: x / WBC x   Sq Epi: x / Non Sq Epi: x / Bacteria: x        Culture - Urine (collected 10 May 2025 11:41)  Source: Urine  Final Report (11 May 2025 18:53):    No growth    Culture - Fungal, Other (collected 10 May 2025 11:41)  Source: Other  Preliminary Report (11 May 2025 11:38):    Testing in progress    Culture - Fungal, Other (collected 10 May 2025 11:23)  Source: Other  Preliminary Report (11 May 2025 11:16):    Testing in progress    Urinalysis with Rflx Culture (collected 10 May 2025 07:09)    Culture - Urine (collected 10 May 2025 05:30)  Source: Clean Catch  Final Report (11 May 2025 11:39):    No growth    Culture - Blood (collected 10 May 2025 02:30)  Source: Blood Blood  Preliminary Report (12 May 2025 09:02):    No growth at 48 Hours    Culture - Blood (collected 10 May 2025 02:15)  Source: Blood Blood  Preliminary Report (12 May 2025 09:02):    No growth at 48 Hours    Urinalysis with Rflx Culture (collected 09 May 2025 16:26)    Culture - Urine (collected 09 May 2025 16:26)  Source: Clean Catch  Final Report (10 May 2025 23:28):    <10,000 CFU/mL Normal Urogenital Genet  
[facility] Division of Hospital Medicine  Luis InfanteDO  Available via MS Teams    SUBJECTIVE / OVERNIGHT EVENTS:  No acute events overnight. Patient seen and examined at bedside this morning.  Denies acute complaints.   We discussed urine culture results.     ADDITIONAL REVIEW OF SYSTEMS:    MEDICATIONS  (STANDING):  bictegravir 50 mG/emtricitabine 200 mG/tenofovir alafenamide 25 mG (BIKTARVY) 1 Tablet(s) Oral daily  buPROPion XL (24-Hour) . 150 milliGRAM(s) Oral daily  dextrose 5% + lactated ringers. 1000 milliLiter(s) (100 mL/Hr) IV Continuous <Continuous>  diltiazem    milliGRAM(s) Oral daily  ezetimibe 10 milliGRAM(s) Oral at bedtime  gabapentin 300 milliGRAM(s) Oral every 8 hours  metoprolol succinate  milliGRAM(s) Oral daily  multivitamin 1 Tablet(s) Oral daily  piperacillin/tazobactam IVPB.. 3.375 Gram(s) IV Intermittent every 8 hours  polyethylene glycol 3350 17 Gram(s) Oral daily  senna 2 Tablet(s) Oral daily  traZODone 50 milliGRAM(s) Oral at bedtime    MEDICATIONS  (PRN):  acetaminophen     Tablet .. 650 milliGRAM(s) Oral every 6 hours PRN Temp greater or equal to 38C (100.4F), Mild Pain (1 - 3)  clonazePAM  Tablet 1 milliGRAM(s) Oral two times a day PRN for anxiety  melatonin 3 milliGRAM(s) Oral at bedtime PRN Insomnia  oxyCODONE    IR 2.5 milliGRAM(s) Oral every 4 hours PRN Moderate Pain (4 - 6)  oxyCODONE    IR 5 milliGRAM(s) Oral every 4 hours PRN Severe Pain (7 - 10)  zolpidem 5 milliGRAM(s) Oral at bedtime PRN Insomnia  zolpidem 5 milliGRAM(s) Oral at bedtime PRN Insomnia      I&O's Summary    12 May 2025 07:01  -  13 May 2025 07:00  --------------------------------------------------------  IN: 350 mL / OUT: 4280 mL / NET: -3930 mL    13 May 2025 07:01  -  13 May 2025 13:09  --------------------------------------------------------  IN: 0 mL / OUT: 600 mL / NET: -600 mL        PHYSICAL EXAM:  Vital Signs Last 24 Hrs  T(C): 37 (13 May 2025 05:15), Max: 37 (13 May 2025 05:15)  T(F): 98.6 (13 May 2025 05:15), Max: 98.6 (13 May 2025 05:15)  HR: 87 (13 May 2025 05:15) (72 - 89)  BP: 135/94 (13 May 2025 05:15) (120/85 - 135/94)  BP(mean): --  RR: 18 (13 May 2025 05:15) (18 - 18)  SpO2: 98% (13 May 2025 05:15) (95% - 98%)    Parameters below as of 13 May 2025 05:15  Patient On (Oxygen Delivery Method): room air    LABS:                        12.1   5.49  )-----------( 288      ( 13 May 2025 06:40 )             36.7     05-13    141  |  107  |  10  ----------------------------<  115[H]  3.9   |  23  |  0.87    Ca    9.0      13 May 2025 06:40  Phos  2.6     05-13  Mg     2.00     05-13    TPro  5.9[L]  /  Alb  3.2[L]  /  TBili  0.2  /  DBili  <0.2  /  AST  19  /  ALT  19  /  AlkPhos  75  05-12          Urinalysis Basic - ( 13 May 2025 06:40 )    Color: x / Appearance: x / SG: x / pH: x  Gluc: 115 mg/dL / Ketone: x  / Bili: x / Urobili: x   Blood: x / Protein: x / Nitrite: x   Leuk Esterase: x / RBC: x / WBC x   Sq Epi: x / Non Sq Epi: x / Bacteria: x

## 2025-05-13 NOTE — PROGRESS NOTE ADULT - PROBLEM SELECTOR PLAN 1
Presenting with leukocytosis, HR>90, and reported fever at home, with likely urinary source given UA purulent with bacteria, leuk esterase, and WBC. Also with dysuria and increased frequency.  - Past urine cultures with Providencia rettgeri (resistant to ampicillins), and Proteus mirabilis    Plan:  - C/w zosyn (05/10 - )  - f/u urine cultures  - f/u blood cultures
Presenting with leukocytosis, HR>90, and reported fever at home, with likely urinary source given UA purulent with bacteria, leuk esterase, and WBC. Also with dysuria and increased frequency.  - Past urine cultures with Providencia rettgeri (resistant to ampicillins), and Proteus mirabilis    Plan:  - C/w zosyn (05/10 - )  - f/u urine cultures, so far neg  - f/u blood cultures, so far neg    Cx data from 5/10 pending.
Presenting with leukocytosis, HR>90, and reported fever at home, with likely urinary source given UA purulent with bacteria, leuk esterase, and WBC. Also with dysuria and increased frequency.  - Past urine cultures with Providencia rettgeri (resistant to ampicillins), and Proteus mirabilis    Plan:  - C/w zosyn (05/10 - )  - f/u urine cultures, so far neg  - f/u blood cultures, so far neg
Presenting with leukocytosis, HR>90, and reported fever at home, with likely urinary source given UA purulent with bacteria, leuk esterase, and WBC. Also with dysuria and increased frequency.  - Past urine cultures with Providencia rettgeri (resistant to ampicillins), and Proteus mirabilis    Plan:  - C/w zosyn (05/10 - )  - f/u urine cultures, so far neg  - f/u blood cultures, so far neg    Cx data from 5/10 without growth.  Will treat with sensitivities from April cultures. Providencia and Proteus at the time sensitive to ceftriaxone.  Will treat with 7 additional days of cefpodoxime.

## 2025-05-13 NOTE — DISCHARGE NOTE PROVIDER - NSDCMRMEDTOKEN_GEN_ALL_CORE_FT
bictegravir/emtricitabine/tenofovir 50 mg-200 mg-25 mg oral tablet: 1 tab(s) orally once a day  buPROPion 150 mg/24 hours (XL) oral tablet, extended release: 1 tab(s) orally once a day  clonazePAM 1 mg oral tablet: 1 tab(s) orally 2 times a day as needed for  anxiety  cyclobenzaprine 10 mg oral tablet: 1 tab(s) orally every 8 hours MDD: 3  Dilaudid 4 mg oral tablet: 1 tab(s) orally every 6 hours as needed for  severe pain MDD: 4  ezetimibe 10 mg oral tablet: 1 tab(s) orally once a day (at bedtime)  gabapentin 300 mg oral capsule: 1 cap(s) orally every 8 hours MDD: 3  Medical Marjuana 4- 5 times as needed per day ( smoking) : as needed  metoprolol succinate 100 mg oral capsule, extended release: 1 cap(s) orally once a day am  Tiadylt  mg/24 hours oral capsule, extended release: 1 cap(s) orally once a day am  traZODone 50 mg oral tablet: 1 tab(s) orally once a day (at bedtime)  Vitamin D: 50,000 units  zolpidem 10 mg oral tablet: 1 tab(s) orally once a day (at bedtime)   bictegravir/emtricitabine/tenofovir 50 mg-200 mg-25 mg oral tablet: 1 tab(s) orally once a day  buPROPion 150 mg/24 hours (XL) oral tablet, extended release: 1 tab(s) orally once a day  cefpodoxime 200 mg oral tablet: 1 tab(s) orally 2 times a day  clonazePAM 1 mg oral tablet: 1 tab(s) orally 2 times a day as needed for  anxiety  ezetimibe 10 mg oral tablet: 1 tab(s) orally once a day (at bedtime)  gabapentin 300 mg oral capsule: 1 cap(s) orally every 8 hours MDD: 3  metoprolol succinate 100 mg oral capsule, extended release: 1 cap(s) orally once a day am  Multiple Vitamins oral tablet: 1 tab(s) orally once a day  polyethylene glycol 3350 oral powder for reconstitution: 17 gram(s) orally once a day as needed for  constipation  senna leaf extract oral tablet: 2 tab(s) orally once a day as needed for  constipation  Tiadylt  mg/24 hours oral capsule, extended release: 1 cap(s) orally once a day am  traZODone 50 mg oral tablet: 1 tab(s) orally once a day (at bedtime)  Vitamin D: 50,000 units  zolpidem 10 mg oral tablet: 1 tab(s) orally once a day (at bedtime)   bictegravir/emtricitabine/tenofovir 50 mg-200 mg-25 mg oral tablet: 1 tab(s) orally once a day  buPROPion 150 mg/24 hours (XL) oral tablet, extended release: 1 tab(s) orally once a day  cefpodoxime 200 mg oral tablet: 1 tab(s) orally 2 times a day  clonazePAM 1 mg oral tablet: 1 tab(s) orally 2 times a day as needed for  anxiety  ezetimibe 10 mg oral tablet: 1 tab(s) orally once a day (at bedtime)  gabapentin 300 mg oral capsule: 1 cap(s) orally every 8 hours MDD: 3  lactulose 10 g oral powder for reconstitution: 1 packet(s) orally once a day as needed for  constipation  metoprolol succinate 100 mg oral capsule, extended release: 1 cap(s) orally once a day am  Multiple Vitamins oral tablet: 1 tab(s) orally once a day  polyethylene glycol 3350 oral powder for reconstitution: 17 gram(s) orally once a day as needed for  constipation  senna leaf extract oral tablet: 2 tab(s) orally once a day as needed for  constipation  Tiadylt  mg/24 hours oral capsule, extended release: 1 cap(s) orally once a day am  traZODone 50 mg oral tablet: 1 tab(s) orally once a day (at bedtime)  Vitamin D: 50,000 units  zolpidem 10 mg oral tablet: 1 tab(s) orally once a day (at bedtime)

## 2025-05-13 NOTE — PROGRESS NOTE ADULT - PROBLEM SELECTOR PLAN 4
On Biktarvy -25    Plan:  -  viral load P  - c/w home Biktarvy
On Biktarvy -25    Plan:  -  viral load P  - c/w home Biktarvy
On Biktarvy -25    Plan:  - c/w home Biktarvy  - outpatient ID f/u
On Biktarvy -25    Plan:  -  viral load P  - c/w home Biktarvy

## 2025-05-13 NOTE — PROGRESS NOTE ADULT - PROBLEM SELECTOR PLAN 8
DVT Prophylaxis: Lovenox after procedure  Diet: NPO pending procedure, DASH/TLC after procedure  Code: DNR/DNI TNIV  PT consulted, recs appreciated  Dispo: anticipate to home pending procedure and improvement of UTI/sepsis
DVT Prophylaxis: Lovenox after procedure  Diet: DASH/TLC  Code: DNR/DNI TNIV  PT consulted, recs appreciated  Dispo: dc to home today

## 2025-05-13 NOTE — DISCHARGE NOTE PROVIDER - ATTENDING DISCHARGE PHYSICAL EXAMINATION:
PHYSICAL EXAM:  Vital Signs Last 24 Hrs  T(C): 37 (13 May 2025 13:16), Max: 37 (13 May 2025 05:15)  T(F): 98.6 (13 May 2025 13:16), Max: 98.6 (13 May 2025 05:15)  HR: 90 (13 May 2025 13:16) (87 - 90)  BP: 150/97 (13 May 2025 13:16) (123/94 - 150/97)  BP(mean): --  RR: 18 (13 May 2025 13:16) (18 - 18)  SpO2: 98% (13 May 2025 13:16) (96% - 98%)    Parameters below as of 13 May 2025 13:16  Patient On (Oxygen Delivery Method): room air      CONSTITUTIONAL: NAD, well-groomed  EYES: PERRLA; conjunctiva and sclera clear  ENMT: Moist oral mucosa, no pharyngeal injection or exudates  NECK: Supple, no palpable masses; no thyromegaly  RESPIRATORY: Normal respiratory effort; lungs are clear to auscultation bilaterally  CARDIOVASCULAR: Regular rate and rhythm, normal S1 and S2, no murmur/rub/gallop; No lower extremity edema; Peripheral pulses are 2+ bilaterally  ABDOMEN: Nontender to palpation, normoactive bowel sounds, no rebound/guarding; No hepatosplenomegaly  MUSCULOSKELETAL: no clubbing or cyanosis of digits; no joint swelling or tenderness to palpation  PSYCH: A+O to person, place, and time; affect appropriate  NEUROLOGY: CN 2-12 are intact and symmetric; no gross sensory deficits   SKIN: No rashes; no palpable lesions  b/l nephrostomy tubes draining clear yellow urine

## 2025-05-14 ENCOUNTER — TRANSCRIPTION ENCOUNTER (OUTPATIENT)
Age: 60
End: 2025-05-14

## 2025-05-16 NOTE — ED PROVIDER NOTE - CONSTITUTIONAL, MLM
Well appearing, awake, alert, oriented to person, place, time/situation and in no apparent distress. normal... phone bill

## 2025-05-20 NOTE — PATIENT PROFILE ADULT - FALL HARM RISK - TYPE OF ASSESSMENT
Patient was calling because he feels like he has swimmers ear in his left ear. He said it just started yesterday and got worse over 5 hours. He started using some ear drops. Its not gotten worse or better. He was wondering if we can call something in. He will be here on Friday with Dr Colon.    [facility]   Phone number     Can we give him a call    Admission

## 2025-05-22 ENCOUNTER — APPOINTMENT (OUTPATIENT)
Dept: TRAUMA SURGERY | Facility: CLINIC | Age: 60
End: 2025-05-22
Payer: MEDICAID

## 2025-05-22 ENCOUNTER — APPOINTMENT (OUTPATIENT)
Dept: INFECTIOUS DISEASE | Facility: CLINIC | Age: 60
End: 2025-05-22

## 2025-05-22 ENCOUNTER — OUTPATIENT (OUTPATIENT)
Dept: OUTPATIENT SERVICES | Facility: HOSPITAL | Age: 60
LOS: 1 days | End: 2025-05-22
Payer: MEDICAID

## 2025-05-22 VITALS
SYSTOLIC BLOOD PRESSURE: 120 MMHG | BODY MASS INDEX: 27.02 KG/M2 | HEART RATE: 70 BPM | WEIGHT: 193 LBS | HEIGHT: 71 IN | DIASTOLIC BLOOD PRESSURE: 86 MMHG | OXYGEN SATURATION: 97 %

## 2025-05-22 DIAGNOSIS — B20 HUMAN IMMUNODEFICIENCY VIRUS [HIV] DISEASE: ICD-10-CM

## 2025-05-22 DIAGNOSIS — Z93.6 OTHER ARTIFICIAL OPENINGS OF URINARY TRACT STATUS: Chronic | ICD-10-CM

## 2025-05-22 DIAGNOSIS — Z93.3 COLOSTOMY STATUS: Chronic | ICD-10-CM

## 2025-05-22 PROCEDURE — 99024 POSTOP FOLLOW-UP VISIT: CPT

## 2025-05-22 PROCEDURE — 99213 OFFICE O/P EST LOW 20 MIN: CPT | Mod: 95

## 2025-05-22 PROCEDURE — G0463: CPT

## 2025-05-27 ENCOUNTER — APPOINTMENT (OUTPATIENT)
Dept: INTERNAL MEDICINE | Facility: CLINIC | Age: 60
End: 2025-05-27
Payer: MEDICAID

## 2025-05-27 ENCOUNTER — LABORATORY RESULT (OUTPATIENT)
Age: 60
End: 2025-05-27

## 2025-05-27 ENCOUNTER — APPOINTMENT (OUTPATIENT)
Dept: DERMATOLOGY | Facility: CLINIC | Age: 60
End: 2025-05-27
Payer: MEDICAID

## 2025-05-27 VITALS
SYSTOLIC BLOOD PRESSURE: 110 MMHG | HEIGHT: 71 IN | TEMPERATURE: 97.8 F | BODY MASS INDEX: 27.3 KG/M2 | WEIGHT: 195 LBS | OXYGEN SATURATION: 96 % | DIASTOLIC BLOOD PRESSURE: 78 MMHG | HEART RATE: 82 BPM

## 2025-05-27 DIAGNOSIS — L70.0 OTHER SKIN CHANGES DUE TO CHRONIC EXPOSURE TO NONIONIZING RADIATION: ICD-10-CM

## 2025-05-27 DIAGNOSIS — L30.9 DERMATITIS, UNSPECIFIED: ICD-10-CM

## 2025-05-27 DIAGNOSIS — Z21 ASYMPTOMATIC HUMAN IMMUNODEFICIENCY VIRUS [HIV] INFECTION STATUS: ICD-10-CM

## 2025-05-27 DIAGNOSIS — C61 MALIGNANT NEOPLASM OF PROSTATE: ICD-10-CM

## 2025-05-27 DIAGNOSIS — Z43.6 ENCOUNTER FOR ATTENTION TO OTHER ARTIFICIAL OPENINGS OF URINARY TRACT: ICD-10-CM

## 2025-05-27 DIAGNOSIS — I10 ESSENTIAL (PRIMARY) HYPERTENSION: ICD-10-CM

## 2025-05-27 DIAGNOSIS — L65.9 NONSCARRING HAIR LOSS, UNSPECIFIED: ICD-10-CM

## 2025-05-27 DIAGNOSIS — C21.0 MALIGNANT NEOPLASM OF ANUS, UNSPECIFIED: ICD-10-CM

## 2025-05-27 DIAGNOSIS — Z93.6 OTHER ARTIFICIAL OPENINGS OF URINARY TRACT STATUS: ICD-10-CM

## 2025-05-27 DIAGNOSIS — D48.5 NEOPLASM OF UNCERTAIN BEHAVIOR OF SKIN: ICD-10-CM

## 2025-05-27 DIAGNOSIS — R21 RASH AND OTHER NONSPECIFIC SKIN ERUPTION: ICD-10-CM

## 2025-05-27 DIAGNOSIS — R73.03 PREDIABETES.: ICD-10-CM

## 2025-05-27 DIAGNOSIS — L57.8 OTHER SKIN CHANGES DUE TO CHRONIC EXPOSURE TO NONIONIZING RADIATION: ICD-10-CM

## 2025-05-27 PROCEDURE — 99214 OFFICE O/P EST MOD 30 MIN: CPT | Mod: 25

## 2025-05-27 PROCEDURE — 11104 PUNCH BX SKIN SINGLE LESION: CPT

## 2025-05-29 LAB
ALBUMIN SERPL ELPH-MCNC: 4.2 G/DL
ALP BLD-CCNC: 102 U/L
ALT SERPL-CCNC: 17 U/L
ANION GAP SERPL CALC-SCNC: 11 MMOL/L
AST SERPL-CCNC: 15 U/L
BASOPHILS # BLD AUTO: 0.05 K/UL
BASOPHILS NFR BLD AUTO: 0.7 %
BILIRUB SERPL-MCNC: 0.2 MG/DL
BUN SERPL-MCNC: 16 MG/DL
CALCIUM SERPL-MCNC: 10.1 MG/DL
CHLORIDE SERPL-SCNC: 101 MMOL/L
CHOLEST SERPL-MCNC: 166 MG/DL
CO2 SERPL-SCNC: 28 MMOL/L
CREAT SERPL-MCNC: 1.02 MG/DL
EGFRCR SERPLBLD CKD-EPI 2021: 85 ML/MIN/1.73M2
EOSINOPHIL # BLD AUTO: 0.17 K/UL
EOSINOPHIL NFR BLD AUTO: 2.4 %
ESTIMATED AVERAGE GLUCOSE: 120 MG/DL
FERRITIN SERPL-MCNC: 194 NG/ML
FOLATE SERPL-MCNC: 15.1 NG/ML
GLUCOSE SERPL-MCNC: 121 MG/DL
HBA1C MFR BLD HPLC: 5.8 %
HCT VFR BLD CALC: 41.3 %
HDLC SERPL-MCNC: 34 MG/DL
HGB BLD-MCNC: 13.2 G/DL
IMM GRANULOCYTES NFR BLD AUTO: 0.3 %
IRON SATN MFR SERPL: 26 %
IRON SERPL-MCNC: 74 UG/DL
LDLC SERPL-MCNC: 104 MG/DL
LYMPHOCYTES # BLD AUTO: 2.59 K/UL
LYMPHOCYTES NFR BLD AUTO: 36 %
MAGNESIUM SERPL-MCNC: 2.2 MG/DL
MAN DIFF?: NORMAL
MCHC RBC-ENTMCNC: 29.7 PG
MCHC RBC-ENTMCNC: 32 G/DL
MCV RBC AUTO: 93 FL
MONOCYTES # BLD AUTO: 0.8 K/UL
MONOCYTES NFR BLD AUTO: 11.1 %
NEUTROPHILS # BLD AUTO: 3.57 K/UL
NEUTROPHILS NFR BLD AUTO: 49.5 %
NONHDLC SERPL-MCNC: 132 MG/DL
PLATELET # BLD AUTO: 355 K/UL
POTASSIUM SERPL-SCNC: 4.8 MMOL/L
PROT SERPL-MCNC: 7 G/DL
RBC # BLD: 4.44 M/UL
RBC # FLD: 14.9 %
SODIUM SERPL-SCNC: 141 MMOL/L
TIBC SERPL-MCNC: 291 UG/DL
TRIGL SERPL-MCNC: 160 MG/DL
TSH SERPL-ACNC: 3.2 UIU/ML
UIBC SERPL-MCNC: 217 UG/DL
VIT B12 SERPL-MCNC: 461 PG/ML
WBC # FLD AUTO: 7.2 K/UL

## 2025-05-29 RX ORDER — TRETINOIN 0.5 MG/G
0.05 CREAM TOPICAL
Qty: 1 | Refills: 6 | Status: ACTIVE | COMMUNITY
Start: 2025-05-27 | End: 1900-01-01

## 2025-05-30 ENCOUNTER — OUTPATIENT (OUTPATIENT)
Dept: OUTPATIENT SERVICES | Facility: HOSPITAL | Age: 60
LOS: 1 days | End: 2025-05-30
Payer: MEDICAID

## 2025-05-30 ENCOUNTER — RESULT REVIEW (OUTPATIENT)
Age: 60
End: 2025-05-30

## 2025-05-30 VITALS
OXYGEN SATURATION: 99 % | DIASTOLIC BLOOD PRESSURE: 82 MMHG | RESPIRATION RATE: 18 BRPM | TEMPERATURE: 98 F | HEART RATE: 88 BPM | SYSTOLIC BLOOD PRESSURE: 123 MMHG

## 2025-05-30 DIAGNOSIS — Z93.6 OTHER ARTIFICIAL OPENINGS OF URINARY TRACT STATUS: Chronic | ICD-10-CM

## 2025-05-30 DIAGNOSIS — Z93.3 COLOSTOMY STATUS: Chronic | ICD-10-CM

## 2025-05-30 DIAGNOSIS — N13.30 UNSPECIFIED HYDRONEPHROSIS: ICD-10-CM

## 2025-05-30 PROCEDURE — 50387 CHANGE NEPHROURETERAL CATH: CPT | Mod: LT

## 2025-06-03 DIAGNOSIS — F33.1 MAJOR DEPRESSIVE DISORDER, RECURRENT, MODERATE: ICD-10-CM

## 2025-06-05 ENCOUNTER — APPOINTMENT (OUTPATIENT)
Dept: INFECTIOUS DISEASE | Facility: CLINIC | Age: 60
End: 2025-06-05

## 2025-06-05 PROCEDURE — 99214 OFFICE O/P EST MOD 30 MIN: CPT | Mod: 95

## 2025-06-09 ENCOUNTER — INPATIENT (INPATIENT)
Facility: HOSPITAL | Age: 60
LOS: 14 days | Discharge: HOME | End: 2025-06-24
Attending: HOSPITALIST | Admitting: HOSPITALIST
Payer: MEDICAID

## 2025-06-09 VITALS
SYSTOLIC BLOOD PRESSURE: 107 MMHG | DIASTOLIC BLOOD PRESSURE: 77 MMHG | HEART RATE: 90 BPM | RESPIRATION RATE: 19 BRPM | TEMPERATURE: 98 F | OXYGEN SATURATION: 99 % | HEIGHT: 71 IN

## 2025-06-09 DIAGNOSIS — Z93.6 OTHER ARTIFICIAL OPENINGS OF URINARY TRACT STATUS: Chronic | ICD-10-CM

## 2025-06-09 DIAGNOSIS — Z93.3 COLOSTOMY STATUS: Chronic | ICD-10-CM

## 2025-06-09 PROCEDURE — 99285 EMERGENCY DEPT VISIT HI MDM: CPT | Mod: 25

## 2025-06-09 NOTE — ED CLERICAL - NS ED CLERK NOTE PRE-ARRIVAL INFORMATION; ADDITIONAL PRE-ARRIVAL INFORMATION
This patient is eligible for (or currently enrolled in) an outpatient care management program available through "[employer]. This program can coordinate outpatient follow up and assist the patient in accessing a variety of outpatient resources.  If discharged from the ED, the patient will be contacted to see if any additional resources are needed.                                                                                    Please call the Nurse Clinical Call Center at [phone] with any questions or for assistance in discharge planning.

## 2025-06-09 NOTE — ED ADULT TRIAGE NOTE - CHIEF COMPLAINT QUOTE
Pt c/o bleeding and pain to b/l nephrostomy tubes. Hx HTN, Prostate Anal Ca, HIV, had tubes changed on may 9th at [facility]. Denies fever, chills, blood thinners, Dark blood noted to b/l tubes.

## 2025-06-09 NOTE — ED CLERICAL - NS ED CLERK NOTE PRE-ARRIVAL INFORMATION; PCP CONTACT INFORMATION
Spoke to patient. She did increase to 100 units and she did that that last 2 days. This morning she was still high so she took 120 units. Her sugar is 197 right now and going down. She said her sugar is better today.    [phone]

## 2025-06-10 DIAGNOSIS — C21.0 MALIGNANT NEOPLASM OF ANUS, UNSPECIFIED: ICD-10-CM

## 2025-06-10 DIAGNOSIS — N12 TUBULO-INTERSTITIAL NEPHRITIS, NOT SPECIFIED AS ACUTE OR CHRONIC: ICD-10-CM

## 2025-06-10 DIAGNOSIS — N39.0 URINARY TRACT INFECTION, SITE NOT SPECIFIED: ICD-10-CM

## 2025-06-10 DIAGNOSIS — I10 ESSENTIAL (PRIMARY) HYPERTENSION: ICD-10-CM

## 2025-06-10 DIAGNOSIS — R31.9 HEMATURIA, UNSPECIFIED: ICD-10-CM

## 2025-06-10 DIAGNOSIS — N20.0 CALCULUS OF KIDNEY: ICD-10-CM

## 2025-06-10 DIAGNOSIS — C61 MALIGNANT NEOPLASM OF PROSTATE: ICD-10-CM

## 2025-06-10 DIAGNOSIS — Z29.9 ENCOUNTER FOR PROPHYLACTIC MEASURES, UNSPECIFIED: ICD-10-CM

## 2025-06-10 DIAGNOSIS — E78.5 HYPERLIPIDEMIA, UNSPECIFIED: ICD-10-CM

## 2025-06-10 LAB
ALBUMIN SERPL ELPH-MCNC: 3.6 G/DL — SIGNIFICANT CHANGE UP (ref 3.3–5)
ALP SERPL-CCNC: 92 U/L — SIGNIFICANT CHANGE UP (ref 40–120)
ALT FLD-CCNC: 9 U/L — SIGNIFICANT CHANGE UP (ref 4–41)
ANION GAP SERPL CALC-SCNC: 10 MMOL/L — SIGNIFICANT CHANGE UP (ref 7–14)
APPEARANCE UR: ABNORMAL
APPEARANCE UR: ABNORMAL
APTT BLD: 27.2 SEC — SIGNIFICANT CHANGE UP (ref 26.1–36.8)
AST SERPL-CCNC: 10 U/L — SIGNIFICANT CHANGE UP (ref 4–40)
BASOPHILS # BLD AUTO: 0.05 K/UL — SIGNIFICANT CHANGE UP (ref 0–0.2)
BASOPHILS NFR BLD AUTO: 0.6 % — SIGNIFICANT CHANGE UP (ref 0–2)
BILIRUB SERPL-MCNC: 0.2 MG/DL — SIGNIFICANT CHANGE UP (ref 0.2–1.2)
BILIRUB UR-MCNC: NEGATIVE — SIGNIFICANT CHANGE UP
BILIRUB UR-MCNC: NEGATIVE — SIGNIFICANT CHANGE UP
BLD GP AB SCN SERPL QL: NEGATIVE — SIGNIFICANT CHANGE UP
BUN SERPL-MCNC: 18 MG/DL — SIGNIFICANT CHANGE UP (ref 7–23)
CALCIUM SERPL-MCNC: 9 MG/DL — SIGNIFICANT CHANGE UP (ref 8.4–10.5)
CHLORIDE SERPL-SCNC: 106 MMOL/L — SIGNIFICANT CHANGE UP (ref 98–107)
CO2 SERPL-SCNC: 23 MMOL/L — SIGNIFICANT CHANGE UP (ref 22–31)
COLOR SPEC: ABNORMAL
COLOR SPEC: ABNORMAL
CREAT SERPL-MCNC: 0.8 MG/DL — SIGNIFICANT CHANGE UP (ref 0.5–1.3)
DIFF PNL FLD: ABNORMAL
DIFF PNL FLD: ABNORMAL
EGFR: 103 ML/MIN/1.73M2 — SIGNIFICANT CHANGE UP
EGFR: 103 ML/MIN/1.73M2 — SIGNIFICANT CHANGE UP
EOSINOPHIL # BLD AUTO: 0.23 K/UL — SIGNIFICANT CHANGE UP (ref 0–0.5)
EOSINOPHIL NFR BLD AUTO: 2.8 % — SIGNIFICANT CHANGE UP (ref 0–6)
GLUCOSE SERPL-MCNC: 96 MG/DL — SIGNIFICANT CHANGE UP (ref 70–99)
GLUCOSE UR QL: NEGATIVE MG/DL — SIGNIFICANT CHANGE UP
GLUCOSE UR QL: NEGATIVE MG/DL — SIGNIFICANT CHANGE UP
HCT VFR BLD CALC: 36.4 % — LOW (ref 39–50)
HCT VFR BLD CALC: 36.6 % — LOW (ref 39–50)
HGB BLD-MCNC: 12.1 G/DL — LOW (ref 13–17)
HGB BLD-MCNC: 12.5 G/DL — LOW (ref 13–17)
IANC: 4.37 K/UL — SIGNIFICANT CHANGE UP (ref 1.8–7.4)
IMM GRANULOCYTES NFR BLD AUTO: 0.5 % — SIGNIFICANT CHANGE UP (ref 0–0.9)
INR BLD: 0.97 RATIO — SIGNIFICANT CHANGE UP (ref 0.85–1.16)
KETONES UR QL: NEGATIVE MG/DL — SIGNIFICANT CHANGE UP
KETONES UR QL: NEGATIVE MG/DL — SIGNIFICANT CHANGE UP
LEUKOCYTE ESTERASE UR-ACNC: ABNORMAL
LEUKOCYTE ESTERASE UR-ACNC: ABNORMAL
LYMPHOCYTES # BLD AUTO: 2.42 K/UL — SIGNIFICANT CHANGE UP (ref 1–3.3)
LYMPHOCYTES # BLD AUTO: 29.1 % — SIGNIFICANT CHANGE UP (ref 13–44)
MAGNESIUM SERPL-MCNC: 2 MG/DL — SIGNIFICANT CHANGE UP (ref 1.6–2.6)
MCHC RBC-ENTMCNC: 29.6 PG — SIGNIFICANT CHANGE UP (ref 27–34)
MCHC RBC-ENTMCNC: 29.8 PG — SIGNIFICANT CHANGE UP (ref 27–34)
MCHC RBC-ENTMCNC: 33.2 G/DL — SIGNIFICANT CHANGE UP (ref 32–36)
MCHC RBC-ENTMCNC: 34.2 G/DL — SIGNIFICANT CHANGE UP (ref 32–36)
MCV RBC AUTO: 87.4 FL — SIGNIFICANT CHANGE UP (ref 80–100)
MCV RBC AUTO: 89 FL — SIGNIFICANT CHANGE UP (ref 80–100)
MONOCYTES # BLD AUTO: 1.2 K/UL — HIGH (ref 0–0.9)
MONOCYTES NFR BLD AUTO: 14.4 % — HIGH (ref 2–14)
NEUTROPHILS # BLD AUTO: 4.37 K/UL — SIGNIFICANT CHANGE UP (ref 1.8–7.4)
NEUTROPHILS NFR BLD AUTO: 52.6 % — SIGNIFICANT CHANGE UP (ref 43–77)
NITRITE UR-MCNC: POSITIVE
NITRITE UR-MCNC: POSITIVE
NRBC # BLD AUTO: 0 K/UL — SIGNIFICANT CHANGE UP (ref 0–0)
NRBC # BLD AUTO: 0 K/UL — SIGNIFICANT CHANGE UP (ref 0–0)
NRBC # FLD: 0 K/UL — SIGNIFICANT CHANGE UP (ref 0–0)
NRBC # FLD: 0 K/UL — SIGNIFICANT CHANGE UP (ref 0–0)
NRBC BLD AUTO-RTO: 0 /100 WBCS — SIGNIFICANT CHANGE UP (ref 0–0)
NRBC BLD AUTO-RTO: 0 /100 WBCS — SIGNIFICANT CHANGE UP (ref 0–0)
PH UR: 5.5 — SIGNIFICANT CHANGE UP (ref 5–8)
PH UR: 7 — SIGNIFICANT CHANGE UP (ref 5–8)
PHOSPHATE SERPL-MCNC: 2.6 MG/DL — SIGNIFICANT CHANGE UP (ref 2.5–4.5)
PLATELET # BLD AUTO: 285 K/UL — SIGNIFICANT CHANGE UP (ref 150–400)
PLATELET # BLD AUTO: 300 K/UL — SIGNIFICANT CHANGE UP (ref 150–400)
POTASSIUM SERPL-MCNC: 4 MMOL/L — SIGNIFICANT CHANGE UP (ref 3.5–5.3)
POTASSIUM SERPL-SCNC: 4 MMOL/L — SIGNIFICANT CHANGE UP (ref 3.5–5.3)
PROT SERPL-MCNC: 7 G/DL — SIGNIFICANT CHANGE UP (ref 6–8.3)
PROT UR-MCNC: 100 MG/DL
PROT UR-MCNC: SIGNIFICANT CHANGE UP MG/DL
PROTHROM AB SERPL-ACNC: 11.5 SEC — SIGNIFICANT CHANGE UP (ref 9.9–13.4)
RBC # BLD: 4.09 M/UL — LOW (ref 4.2–5.8)
RBC # BLD: 4.19 M/UL — LOW (ref 4.2–5.8)
RBC # FLD: 14.1 % — SIGNIFICANT CHANGE UP (ref 10.3–14.5)
RBC # FLD: 14.2 % — SIGNIFICANT CHANGE UP (ref 10.3–14.5)
RH IG SCN BLD-IMP: NEGATIVE — SIGNIFICANT CHANGE UP
SODIUM SERPL-SCNC: 139 MMOL/L — SIGNIFICANT CHANGE UP (ref 135–145)
SP GR SPEC: 1.04 — HIGH (ref 1–1.03)
SP GR SPEC: 1.06 — HIGH (ref 1–1.03)
UROBILINOGEN FLD QL: 1 MG/DL — SIGNIFICANT CHANGE UP (ref 0.2–1)
UROBILINOGEN FLD QL: 1 MG/DL — SIGNIFICANT CHANGE UP (ref 0.2–1)
WBC # BLD: 8.31 K/UL — SIGNIFICANT CHANGE UP (ref 3.8–10.5)
WBC # BLD: 8.47 K/UL — SIGNIFICANT CHANGE UP (ref 3.8–10.5)
WBC # FLD AUTO: 8.31 K/UL — SIGNIFICANT CHANGE UP (ref 3.8–10.5)
WBC # FLD AUTO: 8.47 K/UL — SIGNIFICANT CHANGE UP (ref 3.8–10.5)

## 2025-06-10 PROCEDURE — 74177 CT ABD & PELVIS W/CONTRAST: CPT | Mod: 26

## 2025-06-10 PROCEDURE — 93010 ELECTROCARDIOGRAM REPORT: CPT

## 2025-06-10 PROCEDURE — 99223 1ST HOSP IP/OBS HIGH 75: CPT | Mod: GC

## 2025-06-10 RX ORDER — ACETAMINOPHEN 500 MG/5ML
1000 LIQUID (ML) ORAL ONCE
Refills: 0 | Status: COMPLETED | OUTPATIENT
Start: 2025-06-10 | End: 2025-06-10

## 2025-06-10 RX ORDER — PIPERACILLIN-TAZO-DEXTROSE,ISO 3.375G/5
3.38 IV SOLUTION, PIGGYBACK PREMIX FROZEN(ML) INTRAVENOUS ONCE
Refills: 0 | Status: COMPLETED | OUTPATIENT
Start: 2025-06-10 | End: 2025-06-10

## 2025-06-10 RX ORDER — ACETAMINOPHEN 500 MG/5ML
650 LIQUID (ML) ORAL EVERY 6 HOURS
Refills: 0 | Status: DISCONTINUED | OUTPATIENT
Start: 2025-06-10 | End: 2025-06-16

## 2025-06-10 RX ORDER — OXYCODONE HYDROCHLORIDE 30 MG/1
5 TABLET ORAL EVERY 4 HOURS
Refills: 0 | Status: DISCONTINUED | OUTPATIENT
Start: 2025-06-10 | End: 2025-06-11

## 2025-06-10 RX ORDER — CLONAZEPAM 0.5 MG/1
1 TABLET ORAL
Refills: 0 | Status: DISCONTINUED | OUTPATIENT
Start: 2025-06-10 | End: 2025-06-12

## 2025-06-10 RX ORDER — TRAZODONE HCL 100 MG
50 TABLET ORAL AT BEDTIME
Refills: 0 | Status: DISCONTINUED | OUTPATIENT
Start: 2025-06-10 | End: 2025-06-24

## 2025-06-10 RX ORDER — EZETIMIBE 10 MG/1
10 TABLET ORAL AT BEDTIME
Refills: 0 | Status: DISCONTINUED | OUTPATIENT
Start: 2025-06-10 | End: 2025-06-24

## 2025-06-10 RX ORDER — DILTIAZEM HYDROCHLORIDE 240 MG/1
240 TABLET, EXTENDED RELEASE ORAL DAILY
Refills: 0 | Status: DISCONTINUED | OUTPATIENT
Start: 2025-06-10 | End: 2025-06-10

## 2025-06-10 RX ORDER — ACETAMINOPHEN 500 MG/5ML
1000 LIQUID (ML) ORAL ONCE
Refills: 0 | Status: DISCONTINUED | OUTPATIENT
Start: 2025-06-10 | End: 2025-06-10

## 2025-06-10 RX ORDER — B1/B2/B3/B5/B6/B12/VIT C/FOLIC 500-0.5 MG
1 TABLET ORAL DAILY
Refills: 0 | Status: DISCONTINUED | OUTPATIENT
Start: 2025-06-10 | End: 2025-06-24

## 2025-06-10 RX ORDER — MELATONIN 5 MG
3 TABLET ORAL AT BEDTIME
Refills: 0 | Status: DISCONTINUED | OUTPATIENT
Start: 2025-06-10 | End: 2025-06-16

## 2025-06-10 RX ORDER — BICTEGRAVIR SODIUM, EMTRICITABINE, AND TENOFOVIR ALAFENAMIDE FUMARATE 50; 200; 25 MG/1; MG/1; MG/1
1 TABLET ORAL DAILY
Refills: 0 | Status: DISCONTINUED | OUTPATIENT
Start: 2025-06-10 | End: 2025-06-24

## 2025-06-10 RX ORDER — BUPROPION HYDROBROMIDE 522 MG/1
150 TABLET, EXTENDED RELEASE ORAL DAILY
Refills: 0 | Status: DISCONTINUED | OUTPATIENT
Start: 2025-06-10 | End: 2025-06-24

## 2025-06-10 RX ORDER — POLYETHYLENE GLYCOL 3350 17 G/17G
17 POWDER, FOR SOLUTION ORAL DAILY
Refills: 0 | Status: DISCONTINUED | OUTPATIENT
Start: 2025-06-10 | End: 2025-06-12

## 2025-06-10 RX ORDER — CEFTRIAXONE 500 MG/1
1000 INJECTION, POWDER, FOR SOLUTION INTRAMUSCULAR; INTRAVENOUS ONCE
Refills: 0 | Status: COMPLETED | OUTPATIENT
Start: 2025-06-10 | End: 2025-06-10

## 2025-06-10 RX ORDER — PIPERACILLIN-TAZO-DEXTROSE,ISO 3.375G/5
3.38 IV SOLUTION, PIGGYBACK PREMIX FROZEN(ML) INTRAVENOUS EVERY 8 HOURS
Refills: 0 | Status: DISCONTINUED | OUTPATIENT
Start: 2025-06-11 | End: 2025-06-12

## 2025-06-10 RX ORDER — SENNA 187 MG
2 TABLET ORAL AT BEDTIME
Refills: 0 | Status: DISCONTINUED | OUTPATIENT
Start: 2025-06-10 | End: 2025-06-14

## 2025-06-10 RX ORDER — DILTIAZEM HYDROCHLORIDE 240 MG/1
240 TABLET, EXTENDED RELEASE ORAL DAILY
Refills: 0 | Status: DISCONTINUED | OUTPATIENT
Start: 2025-06-10 | End: 2025-06-16

## 2025-06-10 RX ORDER — OXYCODONE HYDROCHLORIDE 30 MG/1
2.5 TABLET ORAL EVERY 4 HOURS
Refills: 0 | Status: DISCONTINUED | OUTPATIENT
Start: 2025-06-10 | End: 2025-06-11

## 2025-06-10 RX ORDER — PIPERACILLIN-TAZO-DEXTROSE,ISO 3.375G/5
3.38 IV SOLUTION, PIGGYBACK PREMIX FROZEN(ML) INTRAVENOUS ONCE
Refills: 0 | Status: COMPLETED | OUTPATIENT
Start: 2025-06-11 | End: 2025-06-11

## 2025-06-10 RX ORDER — METOPROLOL SUCCINATE 50 MG/1
100 TABLET, EXTENDED RELEASE ORAL DAILY
Refills: 0 | Status: DISCONTINUED | OUTPATIENT
Start: 2025-06-10 | End: 2025-06-10

## 2025-06-10 RX ADMIN — EZETIMIBE 10 MILLIGRAM(S): 10 TABLET ORAL at 21:29

## 2025-06-10 RX ADMIN — DILTIAZEM HYDROCHLORIDE 240 MILLIGRAM(S): 240 TABLET, EXTENDED RELEASE ORAL at 18:39

## 2025-06-10 RX ADMIN — BICTEGRAVIR SODIUM, EMTRICITABINE, AND TENOFOVIR ALAFENAMIDE FUMARATE 1 TABLET(S): 50; 200; 25 TABLET ORAL at 18:39

## 2025-06-10 RX ADMIN — Medication 4 MILLIGRAM(S): at 10:59

## 2025-06-10 RX ADMIN — OXYCODONE HYDROCHLORIDE 5 MILLIGRAM(S): 30 TABLET ORAL at 20:10

## 2025-06-10 RX ADMIN — Medication 1 TABLET(S): at 18:39

## 2025-06-10 RX ADMIN — Medication 5 MILLIGRAM(S): at 21:36

## 2025-06-10 RX ADMIN — CEFTRIAXONE 100 MILLIGRAM(S): 500 INJECTION, POWDER, FOR SOLUTION INTRAMUSCULAR; INTRAVENOUS at 12:15

## 2025-06-10 RX ADMIN — Medication 25 GRAM(S): at 18:39

## 2025-06-10 RX ADMIN — Medication 4 MILLIGRAM(S): at 02:39

## 2025-06-10 RX ADMIN — Medication 200 GRAM(S): at 16:25

## 2025-06-10 RX ADMIN — Medication 400 MILLIGRAM(S): at 02:04

## 2025-06-10 RX ADMIN — OXYCODONE HYDROCHLORIDE 5 MILLIGRAM(S): 30 TABLET ORAL at 19:10

## 2025-06-10 RX ADMIN — BUPROPION HYDROBROMIDE 150 MILLIGRAM(S): 522 TABLET, EXTENDED RELEASE ORAL at 18:40

## 2025-06-10 RX ADMIN — Medication 2 MILLIGRAM(S): at 22:29

## 2025-06-10 RX ADMIN — Medication 2 MILLIGRAM(S): at 21:29

## 2025-06-10 NOTE — ED PROVIDER NOTE - ATTENDING CONTRIBUTION TO CARE
59-year-old male past medical history of HIV, prostate cancer, anal cancer anxiety depression hypertension hyperlipidemia BPH now status post bilateral nephrostomy tubes presenting with bilateral flank pain as well as bloody urine.  Patient states noticed bright red blood coming out of his nephrostomy tubes since earlier this evening.  Patient states that he has had prior blockage to his nephrostomy tubes requiring clearance earlier in May.  Patient's urologist is Dr. Mathew.     Will provide analgesia will attempt to send urinalysis/urine culture from nephrostomy tubes.  Will obtain CT abdomen pelvis.  Of note patient was here in May with a urinary tract infection may be hemorrhagic cystitis.  There is blood draining from bilateral nephrostomy tubes no clots noted at this time.

## 2025-06-10 NOTE — CONSULT NOTE ADULT - SUBJECTIVE AND OBJECTIVE BOX
HPI  60 yo M w/ PMHx of HIV on Biktarvy, HTN, prostate cancer and anal cancer s/p chemotherapy/radiation c/b b/l nephrostomy tubes (urologic damage) and ostomy presents for a few days of bilateral lower back/flank pain and bloody urine/passage of clots since 6 PM today evening.  He endorses burning with urination to the penis in addition to the symptoms.  He also has 1 day of diarrhea through his ostomy after being constipated and taking laxative 2 days ago.  He denies any blood through his ostomy or any emesis, fever/chills or cough/sore throat, falls/trauma, CP, SOB, abdominal pain.  He denies being sick over the past few weeks.  On chart review, bilateral nephrostomy tubes were exchanged on  by interventional radiology.    Urology consulted for hematuria and bilateral flank pain  Patient is AVSS, WBC 8.3, Hct 36.6, Cr 0.8. CTAP demonstrates bilateral nephroureteral tubes in appropriate position with no hydronephrosis bilaterally. Bladder is decompressed and prostate is unremarkable.     PAST MEDICAL & SURGICAL HISTORY:  HTN (hypertension)      HIV infection      Anxiety      History of anal cancer      History of prostate cancer      HLD (hyperlipidemia)      Parastomal hernia with obstruction and without gangrene      S/P colostomy      Nephrostomy present          MEDICATIONS  (STANDING):  acetaminophen   IVPB .. 1000 milliGRAM(s) IV Intermittent once    MEDICATIONS  (PRN):      FAMILY HISTORY:  FH: prostate cancer    FH: breast cancer        Allergies    No Known Allergies    Intolerances        SOCIAL HISTORY:    REVIEW OF SYSTEMS: Otherwise negative as stated in HPI    Physical Exam  Vital signs  T(C): 37.1 (06-10-25 @ 09:22), Max: 37.1 (06-10-25 @ 09:22)  HR: 98 (06-10-25 @ 09:22)  BP: 122/85 (06-10-25 @ 09:22)  SpO2: 100% (06-10-25 @ 09:22)  Wt(kg): --    Output      Gen: NAD  Pulm: No respiratory distress	  CV: RRR  GI: S/ND/NT  :   MSK: moves all 4 limbs spontaneously    LABS:      06-10 @ 09:49    WBC --    / Hct --    / SCr 0.80     06-10 @ 03:22    WBC 8.31  / Hct 36.6  / SCr --       06-10    139  |  106  |  18  ----------------------------<  96  4.0   |  23  |  0.80    Ca    9.0      10 Al 2025 09:49  Phos  2.6     06-10  Mg     2.00     06-10    TPro  7.0  /  Alb  3.6  /  TBili  0.2  /  DBili  x   /  AST  10  /  ALT  9   /  AlkPhos  92  10    PT/INR - ( 10 Al 2025 03:22 )   PT: 11.5 sec;   INR: 0.97 ratio         PTT - ( 10 Al 2025 03:22 )  PTT:27.2 sec  Urinalysis Basic - ( 10 Al 2025 09:50 )    Color: Red / Appearance: Turbid / S.065 / pH: x  Gluc: x / Ketone: x  / Bili: Negative / Urobili: 1.0 mg/dL   Blood: x / Protein: 100 mg/dL / Nitrite: Positive   Leuk Esterase: Large / RBC: >50 /HPF / WBC >50 /HPF   Sq Epi: x / Non Sq Epi: x / Bacteria: Few /HPF          Urinalysis with Rflx Culture (collected 06-10-25 @ 09:50)    Urinalysis with Rflx Culture (collected 06-10-25 @ 08:00)        Urine Cx:    Blood Cx:    RADIOLOGY:     HPI  60 yo M w/ PMHx of HIV on Biktarvy, HTN, prostate cancer and anal cancer s/p chemotherapy/radiation c/b b/l nephrostomy tubes (urologic damage) and ostomy presents for a few days of bilateral lower back/flank pain and bloody urine/passage of clots since 6 PM today evening.  He endorses burning with urination to the penis in addition to the symptoms.  He also has 1 day of diarrhea through his ostomy after being constipated and taking laxative 2 days ago.  He denies any blood through his ostomy or any emesis, fever/chills or cough/sore throat, falls/trauma, CP, SOB, abdominal pain.  He denies being sick over the past few weeks.  On chart review, bilateral nephrostomy tubes were exchanged on  by interventional radiology.    Urology consulted for hematuria and bilateral flank pain  Patient is AVSS, WBC 8.3, Hct 36.6, Cr 0.8. CTAP demonstrates bilateral nephroureteral tubes in appropriate position with no hydronephrosis bilaterally. Bladder is decompressed and prostate is unremarkable.     PAST MEDICAL & SURGICAL HISTORY:  HTN (hypertension)      HIV infection      Anxiety      History of anal cancer      History of prostate cancer      HLD (hyperlipidemia)      Parastomal hernia with obstruction and without gangrene      S/P colostomy      Nephrostomy present          MEDICATIONS  (STANDING):  acetaminophen   IVPB .. 1000 milliGRAM(s) IV Intermittent once    MEDICATIONS  (PRN):      FAMILY HISTORY:  FH: prostate cancer    FH: breast cancer        Allergies    No Known Allergies    Intolerances        SOCIAL HISTORY:    REVIEW OF SYSTEMS: Otherwise negative as stated in HPI    Physical Exam  Vital signs  T(C): 37.1 (06-10-25 @ 09:22), Max: 37.1 (06-10-25 @ 09:22)  HR: 98 (06-10-25 @ 09:22)  BP: 122/85 (06-10-25 @ 09:22)  SpO2: 100% (06-10-25 @ 09:22)  Wt(kg): --    Output      Gen: NAD  Pulm: No respiratory distress	  CV: RRR  GI: soft, nondistended, mildly tender  : bilateral nephrostomy tubes draining clear thin cola colored urine, dressings clean and dry, bilaterally flank pain at tube sites, bladder nonpalpable  MSK: moves all 4 limbs spontaneously    LABS:      06-10 @ 09:49    WBC --    / Hct --    / SCr 0.80     06-10 @ 03:22    WBC 8.31  / Hct 36.6  / SCr --       06-10    139  |  106  |  18  ----------------------------<  96  4.0   |  23  |  0.80    Ca    9.0      10 Al 2025 09:49  Phos  2.6     06-10  Mg     2.00     10    TPro  7.0  /  Alb  3.6  /  TBili  0.2  /  DBili  x   /  AST  10  /  ALT  9   /  AlkPhos  92  -10    PT/INR - ( 10 Al 2025 03:22 )   PT: 11.5 sec;   INR: 0.97 ratio         PTT - ( 10 Al 2025 03:22 )  PTT:27.2 sec  Urinalysis Basic - ( 10 Al 2025 09:50 )    Color: Red / Appearance: Turbid / S.065 / pH: x  Gluc: x / Ketone: x  / Bili: Negative / Urobili: 1.0 mg/dL   Blood: x / Protein: 100 mg/dL / Nitrite: Positive   Leuk Esterase: Large / RBC: >50 /HPF / WBC >50 /HPF   Sq Epi: x / Non Sq Epi: x / Bacteria: Few /HPF          Urinalysis with Rflx Culture (collected 06-10-25 @ 09:50)    Urinalysis with Rflx Culture (collected 06-10-25 @ 08:00)        Urine Cx:    Blood Cx:    RADIOLOGY:  PROCEDURE DATE: 06/10/2025        INTERPRETATION: CLINICAL INFORMATION: Assess nephrostomy tube placement.    COMPARISON: X-ray abdomen 2025.    CONTRAST/COMPLICATIONS:  IV Contrast: Omnipaque [id number] 95 cc administered 5 cc discarded  Oral Contrast: NONE.    PROCEDURE:  CT of the Abdomen and Pelvis was performed.  Sagittal and coronal reformats were performed.    FINDINGS:  LOWER CHEST: Mild bibasilar linear atelectasis versus scarring.    LIVER: Within normal limits.  BILE DUCTS: Normal caliber.  GALLBLADDER: Within normal limits.  SPLEEN: Within normal limits.  PANCREAS: Within normal limits.  ADRENALS: Nonspecific left adrenal thickening  KIDNEYS/URETERS: Bilateral percutaneous nephroureteral tubes with distal tips coiled in the bladder. No hydronephrosis. Bilateral renal cysts the largest of which measuring up to 6.5 cm in the right upper pole and 5.9 cm    BLADDER: Decompressed  REPRODUCTIVE ORGANS: Prostate within normal limits.    BOWEL: Status post partial left hemicolectomy with ostomy/stoma site in the left periumbilical region. Appendix is normal.  PERITONEUM/RETROPERITONEUM: Within normal limits.  VESSELS: IVC filter. Mild atherosclerotic plaques.  LYMPH NODES: No lymphadenopathy.  ABDOMINAL WALL: Left periumbilical stoma site, as stated above. Postsurgical changes. Lipomatous flap overlying the pelvic/gluteal cleft..  BONES: Mild degenerative changes.    IMPRESSION:  Bilateral percutaneous nephroureteral tubes with distal tips coiled in the bladder. No hydronephrosis.        --- End of Report ---

## 2025-06-10 NOTE — H&P ADULT - NSHPSOCIALHISTORY_GEN_ALL_CORE
Retired . Can ambulate slowly sometimes needing assistance from walker/cane. Lives with wife who helps with his medical care. Smokes medical marijuana occasionally. Otherwise no other EtOH or substance use,

## 2025-06-10 NOTE — H&P ADULT - PROBLEM SELECTOR PLAN 2
S/p chemotherapy + radiation in 2020 c/b bilateral nephrostomy tubes and ostomy. In remission. Follows with Dr. Andres Funez (oncology)    - Follow up outpatient with oncology given no active concerns Dark colored in bilateral nephrostomy tubes. 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    - Urology following, appreciate recs  - Maintain active T&S  - trend hgb, transfuse PRN hgb <7  - BP regulation

## 2025-06-10 NOTE — PATIENT PROFILE ADULT - FALL HARM RISK - HARM RISK INTERVENTIONS

## 2025-06-10 NOTE — ED ADULT NURSE NOTE - OBJECTIVE STATEMENT
Pt  seen in ER for evaluation of gross Hematuria  from bilateral nephrostomy tubes s/p tube change, starting 5/9/25, along with lower back pain and abdominal pain.  Pt received alert and oriented x4, able  to make needs known.  Pt noted with color good with respirations even  and non-labored on room air.  Pt noted with abdomen soft and non-distended,  Pt noted colostomy Left lower quadrant and bilateral nephrostomy tube with dark red urine .  Pt c/o pain level 10 out 10.  20G saline lock applied to right hand, labs drawn and pt medicated as per provider order.

## 2025-06-10 NOTE — ED PROVIDER NOTE - PROGRESS NOTE DETAILS
I received this patient in signout.  Order for right nephrostomy tube urine culture was canceled by lab stating "duplicate order" with left nephrostomy tube.  I contacted lab who stated they have the sample for the right nephrostomy urine culture.  I explained to them that we need cultures for both samples, and they stated to place a new order and they will run that.    Moe Velázquez MD (PGY 2) I spoke with the hospitalist who agreed to admit the patient.     Moe Velázquez MD (PGY 2)

## 2025-06-10 NOTE — H&P ADULT - PROBLEM SELECTOR PLAN 7
S/p chemotherapy + radiation in 2020 c/b bilateral nephrostomy tubes and ostomy. In remission. Follows with Dr. Andres Funez (oncology)    - Follow up outpatient with oncology given no active concerns

## 2025-06-10 NOTE — GOALS OF CARE CONVERSATION - ADVANCED CARE PLANNING - CONVERSATION DETAILS
Discussed w/ Patient at bedside in presence of primary RN. Patient stated that he would want all measures for cardiopulmonary resuscitation including CPR and intubation if necessary. He would also want invasive measures including Central line and pressors if needed for stabilization of clinical status.   He states that he would not want to be kept on life support if prognosis is guarded w/o significant chance of meaningful recovery.   If he is unable to make decisions for himself, he would want his wife Trinity Turcios [phone] to make decisions on his behalf.  At this time, he states that he is to be FULL CODE.

## 2025-06-10 NOTE — ED ADULT NURSE REASSESSMENT NOTE - NS ED NURSE REASSESS COMMENT FT1
Patient A&Ox4, respirations even and unlabored. Vitals stable. Patient states improvement in condition, offers no complaints at this time. IV line intact and patent. Patient pending transport to floor bed assignment.
Pt alert and awake able to make needs known.  Pt noted with color good with respirations roxy jorge l non-labored on room air.  Pt denies SOB. Hematuria persist to bilateral nephrostomy tubes.  Pt denies lower back pain or discomfort . Pt reports feeling of need to urinate but being unable to void .  CT results pending , no s/s of distress noted.
[facility] Patient A&Ox4, comfortable on stretcher, respirations even and unlabored. Vitals as noted-stable. Patient offers no complaints at this time. Patient pending urology consult. IV line observed, intact and patent. Stretcher in lowest position, wheels locked, appropriate side rails in place, call bell in reach.

## 2025-06-10 NOTE — H&P ADULT - PROBLEM/PLAN-7
Reason for Disposition   [1] Symptoms of COVID-19 (e.g., cough, fever, SOB, or others) AND [2] within 14 days of EXPOSURE (close contact) with diagnosed or suspected COVID-19 patient   MODERATE difficulty breathing (e.g., speaks in phrases, SOB even at rest, pulse 100-120)    Answer Assessment - Initial Assessment Questions  1. CLOSE CONTACT: \"Who is the person with the confirmed or suspected COVID-19 infection that you were exposed to?\"      Family friend     2. PLACE of CONTACT: \"Where were you when you were exposed to COVID-19? \" (e.g., home, school, medical waiting room; which city?)        Hanging out together     3. TYPE of CONTACT: \"How much contact was there? \" (e.g., sitting next to, live in same house, work in same office, same building)       4. DURATION of CONTACT: \"How long were you in contact with the COVID-19 patient? \" (e.g., a few seconds, passed by person, a few minutes, live with the patient)        2-3 hrs     5. DATE of CONTACT: \"When did you have contact with a COVID-19 patient? \" (e.g., how many days ago)         7/31/20    6. TRAVEL: \"Have you traveled out of the country recently? \" If so, \"When and where? \"      * Also ask about out-of-state travel, since the [facility] has identified some high-risk cities for community spread in the [address] [place] [address]. * Note: Travel becomes less relevant if there is widespread community transmission where the patient lives. Denies    7. COMMUNITY SPREAD: \"Are there lots of cases of COVID-19 (community spread) where you live? \" (See public health department website, if unsure)          8. SYMPTOMS: \"Do you have any symptoms? \" (e.g., fever, cough, breathing difficulty)       Dry Cough, MORENO 8/10- took tylenol and now 2/10, Sore throat, nausea, BA, fatigue, possible fever    9. PREGNANCY OR POSTPARTUM: \"Is there any chance you are pregnant? \" \"When was your last menstrual period? \" \"Did you deliver in the last 2 weeks? \"      NA     10.  HIGH RISK: \"Do you have any heart or lung problems? Do you have a weak immune system? \" (e.g., CHF, COPD, asthma, HIV positive, chemotherapy, renal failure, diabetes mellitus, sickle cell anemia)         Asthma    Protocols used: CORONAVIRUS (COVID-19) EXPOSURE-ADULT-AH, CORONAVIRUS (COVID-19) DIAGNOSED OR SUSPECTED-ADULT-AH    Pt called in on the COVID line    Caller reports symptoms as documented above. Caller informed of disposition. Care advice as documented. Pt verbalized understanding and is agreeable to plan. Please do not respond to the triage nurse through this encounter. Any subsequent communication should be directly with the patient. DISPLAY PLAN FREE TEXT

## 2025-06-10 NOTE — H&P ADULT - PROBLEM SELECTOR PLAN 5
- Continue Ezetimibe  - Consider outpatient Lipid panel and Lp(a) assessment - Continue home Ezetimibe  - Consider outpatient Lipid panel and Lp(a) assessment with cardiology

## 2025-06-10 NOTE — H&P ADULT - PROBLEM SELECTOR PLAN 4
On diltiazem 240mg and metoprolol [id number] at home. Follows with cardiology but has no documented tachyarrhythmia history. Not septic during this admission.    - Reduce diltiazem to 120 mg ER for now  - Reduce home metoprolol succinate to 50mg for now  - inpatient BP goals:  - 160( given hematuria) - Continue home Buproprion  - Continue home PRN clonazepam  - Continue home Ambien as PRN

## 2025-06-10 NOTE — H&P ADULT - ASSESSMENT
59M w/ hx of prostate + anal cancer (s/p chemotherapy + radiation in 2020 c/b bilateral nephrostomy tubes and ostomy), HIV on Biktarvy, HTN, HLD, anxiety/depression presenting for bilateral flank pain and hematuria found to have positive UA and normal functioning nephrostomy tubes per urology suspicious for pyelonephritis. Treating with Zosyn for now given history of pseudomonas.   59M w/ hx of prostate + anal cancer (s/p chemotherapy + radiation in 2020 c/b bilateral nephrostomy tubes and ostomy), HIV on Biktarvy, HTN, HLD, anxiety/depression presenting for bilateral flank pain and hematuria found to have positive UA and normal functioning nephrostomy tubes per urology suggestive of UTI. Treating with Zosyn for now given history of pseudomonas.

## 2025-06-10 NOTE — ED PROVIDER NOTE - PRINCIPAL DIAGNOSIS
DATE OF PROCEDURE:  02/21/2019    INDICATIONS:  Exertional angina and abnormal stress test.    BRIEF CLINICAL SUMMARY:  This is a 52-year-old with history of diabetes, hypertension as well as current smoker and strong family history of premature coronary artery disease as well as hyperlipidemia.  She presents with typical exertional chest discomfort for months if not longer.  She had a stress test which was abnormal showing reversible ischemia in the distribution of the left anterior descending coronary artery.    PROCEDURE IN DETAIL:  The right radial approach was used.  A 6-[place] introducer sheath was placed.     A TIG catheter was advanced.  Left coronary angiography was done.  For the right, a JR4 3.5 five-[place] catheter was used.  A pigtail catheter was used to cross the aortic valve.  Left heart pressures were recorded.  Left ventriculography was done in the ALVAREZ projection.     At the end of the procedure, a TR band was applied.  There were no complications.    RESULTS:      RHYTHM:  Sinus.    HEMODYNAMICS:  The left ventricular pressure was 143/19.    COMMANDS ON HEMODYNAMICS:  The left ventricular end-diastolic pressure is mildly elevated.    LEFT VENTRICULAR ANGIOGRAM:    1. LV angiogram was done in the ALVAREZ projection.  Left ventricular end-diastolic volume was normal.  2. Left ventricular end systolic volume is normal.  3. The ejection fraction is normal.  4. There is no regional wall motion abnormality.  5. The aortic root is normal.    DESCRIPTION OF CORONARY ARTERIES:    1. Coronary anatomy is right dominant with moderate coronary calcification in all 3 vessels.  2. Left main coronary artery has mild plaquing distally.  3. Left anterior descending coronary artery is diffusely diseased.  Beyond the very proximal part of the proximal LAD, there is a long segment of diffuse stenosis in the LAD from proximal to the distal.  In the proximal LAD, there is a long segment of stenosis before the origin of  the diagonal branch, which lesion is the most significant about 80% to 90%.  This is over about 20 mm of the vessel.  The mid LAD has diffuse stenosis in 1 area of significant stenosis about 60% to 70%.  The distal LAD also has diffuse disease with about 50% to 60% stenosis at worst.  The diagonal branch is 1 large proximal artery, which is unremarkable.  4. Circumflex coronary artery has diffuse plaquing.  In addition to plaquing in the proximal and mid circumflex, there is an obtuse marginal branch, which is the largest and essentially the only branch of the circumflex, which has diffuse disease in the mid section of about 70% to 80% as well as distal stenosis at the bifurcation of about 70%.  5. The right coronary artery is large dominant vessel.  Diffuse disease noted in the vessel as well as like in the RCA and circumflex.  In the mid RCA, there is a focal lesion of 70% to 80%.  Distally diffuse plaquing is noted.    CONCLUSION:    1. Normal systolic left ventricular function with mildly elevated left ventricular end-diastolic pressure.  2. Diffuse coronary artery disease including calcification, diffuse plaquing.  3. Three-vessel severe coronary artery disease involving the proximal LAD, the obtuse marginal branch, and the mid RCA.    RECOMMENDATIONS:  Given diabetes and diffuse nature of the disease, the patient will be counseled to consider coronary artery bypass surgery.  She remains on medical treatment at this point with aspirin as well as high-intensity statin.  She has been counseled about smoking cessation.  Beta-blockers will be added if tolerated.        ______________________________  Calin Fong MD  [id number]      D:  02/21/2019 09:02:05  T:  02/21/2019 13:15:27   TIANA/modl   Job:  [id number]     [id number]         Electronically Signed On 03.08.2019 08:55  ___________________________________________________   Calin Fong MD     Pyelonephritis

## 2025-06-10 NOTE — H&P ADULT - PROBLEM SELECTOR PLAN 6
DVT ppx: SCD given hematuria  Diet: Regular  Dispo: Pending PT and Hospital course  Code Status: Need to review (was DNR/DNI during last hospitalization) S/p chemotherapy + radiation in 2020 c/b bilateral nephrostomy tubes and ostomy. In remission. Follows with Dr. Andres Funez (oncology)    - Follow up outpatient with oncology given no active concerns

## 2025-06-10 NOTE — H&P ADULT - PROBLEM SELECTOR PLAN 1
UA positive with bilateral nephrostomy site pain consistent with complex UTI without clinical or radiographic signs of pyelonephritis. Has a history of both sensitive and carbapenem-resistant pseudomonas.    - Start Zosyn (6/10PM - ) plan for 10days as of now  - S/p ceftriaxone 1g 6/10  - Monitor infection signs  - F/u UCx  from both nephrostomy tubes

## 2025-06-10 NOTE — H&P ADULT - PROBLEM SELECTOR PLAN 3
S/p chemotherapy + radiation in 2020 c/b bilateral nephrostomy tubes and ostomy. In remission. Follows with Dr. Andres Funez (oncology)    - Follow up outpatient with oncology given no active concerns On diltiazem 240mg and metoprolol [id number] at home. Follows with cardiology but has no documented tachyarrhythmia history. Not septic during this admission.    - Continue home diltiazem 240 mg  - Hold home metoprolol succinate for now ISO infection  - inpatient BP goals:  - 160 (given hematuria)

## 2025-06-10 NOTE — H&P ADULT - ATTENDING COMMENTS
Patient seen and examined with team.  Agree with above a/p by Dr Wong  59M w/ hx of prostate + anal cancer (s/p chemotherapy + radiation in 2020 c/b bilateral nephrostomy tubes and ostomy), HIV on Biktarvy, HTN, HLD, anxiety/depression presenting for bilateral flank pain and hematuria found to have positive UA and normal functioning nephrostomy tubes per urology suspicious for pyelonephritis. Treating with Zosyn for now given history of pseudomonas.  Patient seen with wife at bedside    PE nad  Vital Signs Last 24 Hrs  T(F): 98.6 HR: 99 , BP: 139/90 , RR: 16 , SpO2: 100% : room air  Lungs cta, cor rrr, abd soft n/t POSITIVE B/L neph tubes with blood and Positive colostomy bag                          12.5   8.31  )-----------( 300      ( 10 Al 2025 03:22 )             36.6   06-10    139  |  106  |  18  ----------------------------<  96  4.0   |  23  |  0.80    < from: CT Abdomen and Pelvis w/ IV Cont (06.10.25 @ 06:27) >  IMPRESSION:  Bilateral percutaneous nephroureteral tubes with distal tips coiled in   the bladder. No hydronephrosis.    UA 6/11 and 6/10 with Pos blood and pos leuk/nitrate    A/P  # ID UTI with ?? Pyelo?? doubt , complicated UTI.  start Iv Zosyn , f/u urine cultures  # B/l Neph tubes. Urology greatly appreciated. No intervention planned. Has Hematuria.   t/s, keep hgb> 7  # HTN c/w Cardizem, plan to restart metoprolol 6/11 P bp is good.  #Pain. Tylenol  for mild pain. Start Oxy 5 mg q6 prm mod/severe. morphine 2 mg iv q6 prn breakthrough.   Start miralax/ senna.

## 2025-06-10 NOTE — H&P ADULT - NSHPLABSRESULTS_GEN_ALL_CORE
12.5   8.31  )-----------( 300      ( 10 Al 2025 03:22 )             36.6       06-10    139  |  106  |  18  ----------------------------<  96  4.0   |  23  |  0.80    Ca    9.0      10 Al 2025 09:49  Phos  2.6     06-10  Mg     2.00     06-10    TPro  7.0  /  Alb  3.6  /  TBili  0.2  /  DBili  x   /  AST  10  /  ALT  9   /  AlkPhos  92  06-10            Urinalysis Basic - ( 10 Al 2025 09:50 )    Color: Red / Appearance: Turbid / S.065 / pH: x  Gluc: x / Ketone: x  / Bili: Negative / Urobili: 1.0 mg/dL   Blood: x / Protein: 100 mg/dL / Nitrite: Positive   Leuk Esterase: Large / RBC: >50 /HPF / WBC >50 /HPF   Sq Epi: x / Non Sq Epi: x / Bacteria: Few /HPF        PT/INR - ( 10 Al 2025 03:22 )   PT: 11.5 sec;   INR: 0.97 ratio    PTT - ( 10 Al 2025 03:22 )  PTT:27.2 sec      Personal interpretation EKG: None on file

## 2025-06-10 NOTE — H&P ADULT - NSHPREVIEWOFSYSTEMS_GEN_ALL_CORE
Prior cultures in 03/2025 - 04/2025  Proteus (nearly pan-sensitive) and Providencia resistant to Augmentin, Ancef, and aminoglycosides. Review of Systems:     CONSTITUTIONAL: No fever, weight loss  EYES: No eye pain, visual disturbances, or discharge  ENMT:  No difficulty hearing; No sinus or throat pain  RESPIRATORY: No SOB. No cough, wheezing, chills  CARDIOVASCULAR: No chest pain, palpitations, dizziness, or leg swelling  GASTROINTESTINAL: +abdominal stoma pain. No nausea, vomiting; No diarrhea or constipation.   GENITOURINARY: +dysuria, +hematuria through nephrostomy, no incontinence  NEUROLOGICAL: No headaches, loss of strength, numbness, or tremors  MUSCULOSKELETAL: No joint pain or swelling; No muscle pain

## 2025-06-10 NOTE — H&P ADULT - HISTORY OF PRESENT ILLNESS
59M w/ hx of prostate + anal cancer (s/p chemotherapy + radiation in 2020 c/b bilateral nephrostomy tubes and ostomy), HIV on Biktarvy, HTN, HLD, anxiety/depression presenting for bilateral flank pain and hematuria.    Of note, patient was admitted  one month priror for poor nephrostomy drainage and urosepsis.  Patient follows with urologist Dr. Mathew.       ED Course:  Afebrile and vs unremarkable on RA. Seen by urology who felt flow was appropriate and recommended medical management. CBC showing hgb 12.5. CMP wnl. UA sent from both Left (first sample per ED noted) and Right nephrostomy tubes with high SG, moderate-Large blood, +leuk esterase, >50 WBC, and few bacteria. CT A/p showing bilateral nephroureteral tubes in place with decompressed bladder. Was given 1g ceftriaxone, Ofirmev, and morphine. 59M w/ hx of prostate + anal cancer (s/p chemotherapy + radiation in 2020 c/b bilateral nephrostomy tubes and ostomy), HIV on Biktarvy, HTN, HLD, anxiety/depression presenting for bilateral flank pain and hematuria.  Patient reports that he had a abdominal hernia mesh last month in May around his ostomy site. For the last two weeks he has had pain that started at the stoma which then progressed to Right then left bilateral flank pain at his nephrostomy tube insertion sites. Patient came to the ED when he developed bloody output into both nephrostomy tubes (at baseline is normal yellow urine color) and pain was no longer managable with Tylenol. Patient denies any fevers chills, weight loss, SOB, URI symptoms, leg swelling.  Patient follows with urologist Dr. Mathew, oncologist Dr. Andres Funez, and a surgeon.   Of note, patient was admitted  one month prior for poor nephrostomy drainage and urosepsis. At baseline he has no urine output through the penis but he will urinate through the penis if one or more tubes become blocked.    ED Course:  Afebrile and vs unremarkable on RA. Seen by urology who felt flow was appropriate and recommended medical management. CBC showing hgb 12.5. CMP wnl. UA sent from both Left (first sample per ED noted) and Right nephrostomy tubes with high SG, moderate-Large blood, +leuk esterase, >50 WBC, and few bacteria. CT A/p showing bilateral nephroureteral tubes in place with decompressed bladder. Was given 1g ceftriaxone, Ofirmev, and morphine.

## 2025-06-10 NOTE — H&P ADULT - PROBLEM SELECTOR PLAN 8
DVT ppx: SCD given hematuria  Diet: Regular  Dispo: Pending PT and Hospital course  Code Status: Need to review (was DNR/DNI during last hospitalization)

## 2025-06-10 NOTE — CONSULT NOTE ADULT - ASSESSMENT
58 yo M w/ PMHx of HIV on Biktarvy, HTN, prostate cancer and anal cancer s/p chemotherapy/radiation c/b b/l nephrostomy tubes (exchanged on 5/9 by IR) and ostomy presents for a few days of bilateral lower back/flank pain and bloody urine/passage of clots since 6 PM 6/9/25. Urology consulted for hematuria and bilateral flank pain. Patient is AVSS, WBC 8.3, Hct 36.6, Cr 0.8. UA positive. CTAP demonstrates bilateral nephroureteral tubes in appropriate position with no hydronephrosis bilaterally. Bladder is decompressed and prostate is unremarkable.     Hematuria can sometimes occur in setting of UTI and can be intermittent for as long as nephroureteral stents are in place. No intervention currently indicated as patient continues to void appropriately with decompressed bladder, normal H/H, normal Cr, and no hydronephrosis bilaterally    Recommendations:  - No urologic intervention  - Continue nephroureteral tubes > managed by IR  - Hydration  - F/u cultures    ***INCOMPLETE*** 58 yo M w/ PMHx of HIV on Biktarvy, HTN, prostate cancer and anal cancer s/p chemotherapy/radiation c/b b/l nephrostomy tubes (exchanged on 5/9 by IR) and ostomy presents for a few days of bilateral lower back/flank pain and bloody urine/passage of clots since 6 PM 6/9/25. Urology consulted for hematuria and bilateral flank pain. Patient is AVSS, WBC 8.3, Hct 36.6, Cr 0.8. UA positive. CTAP demonstrates bilateral nephroureteral tubes in appropriate position with no hydronephrosis bilaterally. Bladder is decompressed and prostate is unremarkable.     Hematuria can sometimes occur in setting of UTI and can be intermittent for as long as nephroureteral stents are in place. Cola colored urine will persist as old clot is lysed in the urine. Bilateral flank pain and burning with urination may be 2/2 cystitis/ ascending UTI and may improve w antibiotics. No intervention currently indicated as patient continues to void appropriately with decompressed bladder, normal H/H, normal Cr, and no hydronephrosis bilaterally    Recommendations:  - No urologic intervention  - Continue nephroureteral tubes > managed by IR  - Hydration  - Antibiotics for suspected UTI  - F/u cultures  - F/u w urologist Dr. Mathew outpatient    D/w Dr. Jose Francisco Kiran [facility]  [address] [place] [phone]

## 2025-06-10 NOTE — ED ADULT NURSE NOTE - CHIEF COMPLAINT QUOTE
Pt c/o bleeding and pain to b/l nephrostomy tubes. Hx HTN, Prostate Anal Ca, HIV, had tubes changed on may 9th at [facility]. Denies fever, chills, blood thinners, Dark blood noted to b/l tubes.

## 2025-06-10 NOTE — CHART NOTE - NSCHARTNOTEFT_GEN_A_CORE
Confidential Drug Utilization Report  Search Terms: David Turcios, 1965Search Date: 06/10/2025 16:30:41 PM  Searching on behalf of: [id number] - [facility]  The Drug Utilization Report below displays all of the controlled substance prescriptions, if any, that your patient has filled in the last twelve months. The information displayed on this report is compiled from pharmacy submissions to the Department, and accurately reflects the information as submitted by the pharmacies.    This report was requested by: Maximo Wong | Reference #: [id number]    Practitioner Count: 4  Pharmacy Count: 3  Current Opioid Prescriptions: 0  Current Benzodiazepine Prescriptions: 1  Current Stimulant Prescriptions: 0      Patient Demographic Information (PDI)       PDI	First Name	Last Name	Birth Date	Gender	Street Address	[facility]  A	David Turcios	1965	Unknown	[address] [place] [id number]  B	David Turcios	1965	Male	[address] [place] [id number]  C	David Turcios	1965	Male	[address] [place] [phone]    Prescription Information      PDI Filter:    PDI	Current Rx	Drug Type	Rx Written	Rx Dispensed	Drug	Quantity	Days Supply	Prescriber Name	Prescriber NEFTALY #	Payment Method	Dispenser  A	Y		04/01/2025	05/31/2025	vireo whole flower 0.0 mg - 1mg thc:<0.5mg cbd/inh	1	24	OsMarty gutierrez	[id number]	[facility]  A	Y		04/01/2025	05/31/2025	vireo whole flower 0.0 mg - 1mg thc:<0.5mg cbd/inh	1	24	OsMarty gutierrez	[id number]	[facility]  A	Y		04/01/2025	04/23/2025	vireo whole flower 0.0 mg - 1mg thc:<0.5mg cbd/inh	2	48	OsMarty gutierrez	[id number]	[facility]  A	N		03/05/2024	02/28/2025	vireo whole flower 0.0 mg - 1mg thc:<0.5mg cbd/inh	1	24	Jeffrey Logan	[id number]	[facility]  A	N		03/05/2024	02/28/2025	vireo whole flower 0.0 mg - 1mg thc:<0.5mg cbd/inh	1	24	Logan, Jeffrey	[id number]	[facility]  A	N		03/05/2024	01/30/2025	vireo whole flower 0.0 mg - 1mg thc:<0.5mg cbd/inh	2	48	Logan, Jeffrey	[id number]	[facility]  A	N		03/05/2024	12/28/2024	vireo whole flower 0.0 mg - 1mg thc:<0.5mg cbd/inh	4	48	Logan, Jeffrey	[id number]	[facility]  A	N		03/05/2024	08/01/2024	vireo whole flower 0.0 mg - 1mg thc:<0.5mg cbd/inh	1	24	Logan, Jeffrey	[id number]	[facility]  A	N		03/05/2024	07/29/2024	vireo whole flower 0.0 mg - 1mg thc:<0.5mg cbd/inh	8	24	Logan, Jeffrey	[id number]	[facility]  A	N		03/05/2024	07/03/2024	vireo whole flower 0.0 mg - 1mg thc:<0.5mg cbd/inh	4	24	Logan, Jeffrey	[id number]	[facility]  B	Y		04/24/2025	05/26/2025	zolpidem tartrate 10 mg tablet	30	30	Marii Becerra	[id number]	Medicaid	[id number]  B	Y	B	05/22/2025	05/26/2025	clonazepam 1 mg tablet	120	30	Marii Becerra	[id number]	Medicaid	[id number]  B	N	B	04/24/2025	04/28/2025	clonazepam 1 mg tablet	120	30	Marii Becerra	[id number]	Medicaid	[id number]  B	N		04/24/2025	04/25/2025	zolpidem tartrate 10 mg tablet	30	30	Marii Becerra	[id number]	Medicaid	[id number]  B	N	O	04/12/2025	04/13/2025	hydromorphone 4 mg tablet	28	7	[facility]	[id number]	Medicaid	[id number]  B	N	O	04/07/2025	04/08/2025	oxycodone hcl (ir) 5 mg tablet	5	2	Kathrine Recio	[id number]	Medicaid	[id number]  B	N	B	03/06/2025	03/31/2025	clonazepam 1 mg tablet	120	30	Marii Becerra	[id number]	Medicaid	[id number]  B	N		03/06/2025	03/10/2025	zolpidem tartrate 10 mg tablet	30	30	Marii Becerra	[id number]	Medicaid	[id number]  B	N	B	02/06/2025	02/08/2025	clonazepam 1 mg tablet	120	30	Marii Becerra	[id number]	Medicaid	[id number]  B	N		09/03/2024	02/05/2025	zolpidem tartrate 10 mg tablet	30	30	Hanh Yin	[id number]	Medicaid	[id number]  B	N		09/03/2024	01/06/2025	zolpidem tartrate 10 mg tablet	30	30	Hanh Yin	[id number]	Medicaid	[id number]  B	N	B	11/21/2024	12/16/2024	clonazepam 1 mg tablet	120	30	Marii Becerra	[id number]	Medicaid	[id number]  B	N		09/03/2024	12/09/2024	zolpidem tartrate 10 mg tablet	30	30	Hanh Yin	[id number]	Medicaid	[id number]  B	N		09/03/2024	11/07/2024	zolpidem tartrate 10 mg tablet	30	30	Hanh Yin	[id number]	Medicaid	[id number]  B	N	B	10/24/2024	10/24/2024	clonazepam 1 mg tablet	120	30	Marii Becerra	[id number]	Medicaid	[id number]  C	N		04/01/2025	04/01/2025	matter whole flower 7.1mg thc – 8mg thc:<0.5mg cbd/3 sec inh	1	7	Marty Garcia	[id number]	[facility]	N		09/03/2024	10/09/2024	zolpidem tartrate 10 mg tablet	30	30	AnnamariaHanh	[id number]	Medicaid	[id number]  C	N		03/05/2024	10/07/2024	curaleaf whole flower 3.1mg ? 4.0mg thc:<0.5mg cbd/inh	2	8	Jeffrey Logan	[id number]	[facility]	N		03/05/2024	10/07/2024	pharmacann whole flower 5.7mg ? 7.1mg thc:<0.5mg cbd/inh	7	14	Jeffrey Logan	[id number]	[facility]	N	B	09/26/2024	09/26/2024	clonazepam 1 mg tablet	90	30	Marii Becerra	[id number]	Medicaid	[id number]  C	N		09/03/2024	09/11/2024	zolpidem tartrate 10 mg tablet	30	30	AnnamariaHanh medina	[id number]	Medicaid	[id number]  C	N		03/05/2024	09/11/2024	matter whole flower 3.1mg thc – 4mg thc:<0.5mg cbd/3 sec inh	2	14	Jeffrey Logan	[id number]	[facility]	N		03/05/2024	09/11/2024	curaleaf (1:20) 0.24mgthc and 5 mgcbd/0.5 ml tct unflavored	1	5	Jeffrey Logan	[id number]	[facility]  C	N	B	09/03/2024	09/05/2024	alprazolam 2 mg tablet	60	30	AnnamariaHanh medina	[id number]	Medicaid	[id number]  C	N		04/16/2024	08/12/2024	zolpidem tartrate 10 mg tablet	30	30	AnnamariaHanh medina	[id number]	Medicaid	[id number]  C	N	B	08/06/2024	08/07/2024	alprazolam 2 mg tablet	60	30	AnnamariaHanh medina	[id number]	Medicaid	[id number]  C	N		04/16/2024	07/12/2024	zolpidem tartrate 10 mg tablet	30	30	Hanh Yin	[id number]	Medicaid	[id number]  C	N		04/16/2024	06/12/2024	zolpidem tartrate 10 mg tablet	30	30	AnnamariaHanh medina	[id number]	Medicaid	[id number]  C	N	B	06/03/2024	06/12/2024	alprazolam 1 mg tablet	120	30	Annamaria, [id number]	Medicaid	[id number]    * - Details of Drug Type : O = Opioid, B = Benzodiazepine, S = Stimulant    * - Drugs marked with an asterisk are compound drugs. If the compound drug is made up of more than one controlled substance, then each controlled substance will be a separate row in the table.

## 2025-06-10 NOTE — H&P ADULT - NSHPPHYSICALEXAM_GEN_ALL_CORE
VITALS:   T(C): 37 (06-10-25 @ 14:17), Max: 37.1 (06-10-25 @ 09:22)  HR: 99 (06-10-25 @ 14:17) (90 - 99)  BP: 134/105 (06-10-25 @ 14:17) (102/70 - 134/105)  RR: 16 (06-10-25 @ 14:17) (16 - 19)  SpO2: 100% (06-10-25 @ 14:17) (93% - 100%)    GENERAL: NAD, lying in bed comfortably  HEAD:  Atraumatic, Normocephalic  EYES: EOMI, conjunctiva and sclera clear  ENT: Moist mucous membranes  CHEST/LUNG: Clear to auscultation bilaterally; No rales, rhonchi, wheezing, or rubs. Unlabored respirations  HEART: Regular rate and rhythm; No murmurs, rubs, or gallops  ABDOMEN: Soft, ostomy present near umbilicus, tender to palpation near stoma, nondistended  EXTREMITIES:  No clubbing, cyanosis, or edema  NERVOUS SYSTEM:  A&Ox3, no focal deficits   SKIN: No rashes or lesions

## 2025-06-10 NOTE — ED PROVIDER NOTE - CLINICAL SUMMARY MEDICAL DECISION MAKING FREE TEXT BOX
60 yo M w/ PMHx of HTN, prostate cancer and anal cancer s/p chemotherapy/radiation c/b b/l nephrostomy tubes (urologic damage) and ostomy presents for a few days of bilateral lower back/flank pain and bloody urine/passage of clots since 6 PM today evening.  He endorses burning with urination to the penis in addition to the symptoms.  He also has 1 day of diarrhea through his ostomy after being constipated and taking laxative 2 days ago.  He denies any blood through his ostomy or any emesis, fever/chills or cough/sore throat, falls/trauma, CP, SOB, abdominal pain.  He denies being sick over the past few weeks.  On chart review, bilateral nephrostomy tubes were exchanged on 5/9 by interventional radiology.    Urologist: Dr. Ravi Mathew    Vital signs unremarkable.    GENERAL: mild acute distress, endomorphic body habitus  HEENT: atraumatic, normocephalic, vision grossly intact, EOMI, no conjunctivitis or discharge, hearing grossly intact, no nasal discharge or epistaxis, clear pharynx  CV: regular rate, normal rhythm, normal S1/S2, no murmurs/rubs, no cyanosis  PULM: normal work of breathing, normal O2 saturation on RA, clear breath sounds in b/l upper/lower lung fields, no crackles/rales/rhonchi/wheezing  GI: Infraumbilical ostomy with no surrounding erythema/purulent discharge, soft/non-tender/nondistended abdomen, no guarding or rebound tenderness, no palpable masses  : b/l CVA TTP with nephrostomy tubes in place  NEURO: A&Ox4, follows commands, normal speech, no focal motor or sensory deficits  MSK: no joint tenderness/swelling/erythema, ranging all extremities with no appreciable loss of ROM  EXT: no peripheral edema, no calf tenderness, no redness or swelling  SKIN: warm, dry, and intact, no rashes  PSYCH: appropriate mood and affect    Concern for blood in bilateral nephrostomy tubes with passage of close secondary to infectious/traumatic etiology.  Plan for CT abdomen/pelvis, labs, and urologic consult. 58 yo M w/ PMHx of HIV on Biktarvy, HTN, prostate cancer and anal cancer s/p chemotherapy/radiation c/b b/l nephrostomy tubes (urologic damage) and ostomy presents for a few days of bilateral lower back/flank pain and bloody urine/passage of clots since 6 PM today evening.  He endorses burning with urination to the penis in addition to the symptoms.  He also has 1 day of diarrhea through his ostomy after being constipated and taking laxative 2 days ago.  He denies any blood through his ostomy or any emesis, fever/chills or cough/sore throat, falls/trauma, CP, SOB, abdominal pain.  He denies being sick over the past few weeks.  On chart review, bilateral nephrostomy tubes were exchanged on 5/9 by interventional radiology.    Urologist: Dr. Ravi Mathew    Vital signs unremarkable.    GENERAL: mild acute distress, endomorphic body habitus  HEENT: atraumatic, normocephalic, vision grossly intact, EOMI, no conjunctivitis or discharge, hearing grossly intact, no nasal discharge or epistaxis, clear pharynx  CV: regular rate, normal rhythm, normal S1/S2, no murmurs/rubs, no cyanosis  PULM: normal work of breathing, normal O2 saturation on RA, clear breath sounds in b/l upper/lower lung fields, no crackles/rales/rhonchi/wheezing  GI: Infraumbilical ostomy with no surrounding erythema/purulent discharge, soft/non-tender/nondistended abdomen, no guarding or rebound tenderness, no palpable masses  : b/l CVA TTP with nephrostomy tubes in place  NEURO: A&Ox4, follows commands, normal speech, no focal motor or sensory deficits  MSK: no joint tenderness/swelling/erythema, ranging all extremities with no appreciable loss of ROM  EXT: no peripheral edema, no calf tenderness, no redness or swelling  SKIN: warm, dry, and intact, no rashes  PSYCH: appropriate mood and affect    Concern for blood in bilateral nephrostomy tubes with passage of close secondary to infectious/traumatic etiology.  Plan for CT abdomen/pelvis, labs, and urologic consult.

## 2025-06-10 NOTE — PATIENT PROFILE ADULT - STATED REASON FOR ADMISSION
Take the dose of Metolazone tomorrow 30 minutes prior to Bumex.     Follow up closely with cardiology.     Seek immediate medical attention if you have new or worsening symptoms, or for any other concern.  
flank pain, blood in urine

## 2025-06-11 LAB
ALBUMIN SERPL ELPH-MCNC: 3.8 G/DL — SIGNIFICANT CHANGE UP (ref 3.3–5)
ALP SERPL-CCNC: 91 U/L — SIGNIFICANT CHANGE UP (ref 40–120)
ALT FLD-CCNC: 11 U/L — SIGNIFICANT CHANGE UP (ref 4–41)
ANION GAP SERPL CALC-SCNC: 12 MMOL/L — SIGNIFICANT CHANGE UP (ref 7–14)
APTT BLD: 30.8 SEC — SIGNIFICANT CHANGE UP (ref 26.1–36.8)
AST SERPL-CCNC: 13 U/L — SIGNIFICANT CHANGE UP (ref 4–40)
BASOPHILS # BLD AUTO: 0.04 K/UL — SIGNIFICANT CHANGE UP (ref 0–0.2)
BASOPHILS NFR BLD AUTO: 0.6 % — SIGNIFICANT CHANGE UP (ref 0–2)
BILIRUB SERPL-MCNC: 0.3 MG/DL — SIGNIFICANT CHANGE UP (ref 0.2–1.2)
BUN SERPL-MCNC: 15 MG/DL — SIGNIFICANT CHANGE UP (ref 7–23)
CALCIUM SERPL-MCNC: 9 MG/DL — SIGNIFICANT CHANGE UP (ref 8.4–10.5)
CHLORIDE SERPL-SCNC: 104 MMOL/L — SIGNIFICANT CHANGE UP (ref 98–107)
CO2 SERPL-SCNC: 24 MMOL/L — SIGNIFICANT CHANGE UP (ref 22–31)
CREAT SERPL-MCNC: 0.93 MG/DL — SIGNIFICANT CHANGE UP (ref 0.5–1.3)
EGFR: 95 ML/MIN/1.73M2 — SIGNIFICANT CHANGE UP
EGFR: 95 ML/MIN/1.73M2 — SIGNIFICANT CHANGE UP
EOSINOPHIL # BLD AUTO: 0.16 K/UL — SIGNIFICANT CHANGE UP (ref 0–0.5)
EOSINOPHIL NFR BLD AUTO: 2.2 % — SIGNIFICANT CHANGE UP (ref 0–6)
GLUCOSE SERPL-MCNC: 90 MG/DL — SIGNIFICANT CHANGE UP (ref 70–99)
HCT VFR BLD CALC: 37.7 % — LOW (ref 39–50)
HGB BLD-MCNC: 12.5 G/DL — LOW (ref 13–17)
IANC: 3.63 K/UL — SIGNIFICANT CHANGE UP (ref 1.8–7.4)
IMM GRANULOCYTES NFR BLD AUTO: 0.6 % — SIGNIFICANT CHANGE UP (ref 0–0.9)
INR BLD: 1.07 RATIO — SIGNIFICANT CHANGE UP (ref 0.85–1.16)
LYMPHOCYTES # BLD AUTO: 2.56 K/UL — SIGNIFICANT CHANGE UP (ref 1–3.3)
LYMPHOCYTES # BLD AUTO: 35.5 % — SIGNIFICANT CHANGE UP (ref 13–44)
MAGNESIUM SERPL-MCNC: 2.1 MG/DL — SIGNIFICANT CHANGE UP (ref 1.6–2.6)
MCHC RBC-ENTMCNC: 29.8 PG — SIGNIFICANT CHANGE UP (ref 27–34)
MCHC RBC-ENTMCNC: 33.2 G/DL — SIGNIFICANT CHANGE UP (ref 32–36)
MCV RBC AUTO: 89.8 FL — SIGNIFICANT CHANGE UP (ref 80–100)
MONOCYTES # BLD AUTO: 0.79 K/UL — SIGNIFICANT CHANGE UP (ref 0–0.9)
MONOCYTES NFR BLD AUTO: 10.9 % — SIGNIFICANT CHANGE UP (ref 2–14)
NEUTROPHILS # BLD AUTO: 3.63 K/UL — SIGNIFICANT CHANGE UP (ref 1.8–7.4)
NEUTROPHILS NFR BLD AUTO: 50.2 % — SIGNIFICANT CHANGE UP (ref 43–77)
NRBC # BLD AUTO: 0 K/UL — SIGNIFICANT CHANGE UP (ref 0–0)
NRBC # FLD: 0 K/UL — SIGNIFICANT CHANGE UP (ref 0–0)
NRBC BLD AUTO-RTO: 0 /100 WBCS — SIGNIFICANT CHANGE UP (ref 0–0)
PHOSPHATE SERPL-MCNC: 3.4 MG/DL — SIGNIFICANT CHANGE UP (ref 2.5–4.5)
PLATELET # BLD AUTO: 323 K/UL — SIGNIFICANT CHANGE UP (ref 150–400)
POTASSIUM SERPL-MCNC: 4.1 MMOL/L — SIGNIFICANT CHANGE UP (ref 3.5–5.3)
POTASSIUM SERPL-SCNC: 4.1 MMOL/L — SIGNIFICANT CHANGE UP (ref 3.5–5.3)
PROT SERPL-MCNC: 6.8 G/DL — SIGNIFICANT CHANGE UP (ref 6–8.3)
PROTHROM AB SERPL-ACNC: 12.4 SEC — SIGNIFICANT CHANGE UP (ref 9.9–13.4)
RBC # BLD: 4.2 M/UL — SIGNIFICANT CHANGE UP (ref 4.2–5.8)
RBC # FLD: 14.2 % — SIGNIFICANT CHANGE UP (ref 10.3–14.5)
SODIUM SERPL-SCNC: 140 MMOL/L — SIGNIFICANT CHANGE UP (ref 135–145)
WBC # BLD: 7.22 K/UL — SIGNIFICANT CHANGE UP (ref 3.8–10.5)
WBC # FLD AUTO: 7.22 K/UL — SIGNIFICANT CHANGE UP (ref 3.8–10.5)

## 2025-06-11 PROCEDURE — 99233 SBSQ HOSP IP/OBS HIGH 50: CPT | Mod: GC

## 2025-06-11 RX ORDER — OXYCODONE HYDROCHLORIDE 30 MG/1
5 TABLET ORAL EVERY 4 HOURS
Refills: 0 | Status: DISCONTINUED | OUTPATIENT
Start: 2025-06-11 | End: 2025-06-12

## 2025-06-11 RX ORDER — OXYCODONE HYDROCHLORIDE 30 MG/1
7.5 TABLET ORAL EVERY 4 HOURS
Refills: 0 | Status: DISCONTINUED | OUTPATIENT
Start: 2025-06-11 | End: 2025-06-12

## 2025-06-11 RX ORDER — ONDANSETRON HCL/PF 4 MG/2 ML
4 VIAL (ML) INJECTION ONCE
Refills: 0 | Status: COMPLETED | OUTPATIENT
Start: 2025-06-11 | End: 2025-06-11

## 2025-06-11 RX ADMIN — DILTIAZEM HYDROCHLORIDE 240 MILLIGRAM(S): 240 TABLET, EXTENDED RELEASE ORAL at 05:45

## 2025-06-11 RX ADMIN — OXYCODONE HYDROCHLORIDE 5 MILLIGRAM(S): 30 TABLET ORAL at 15:19

## 2025-06-11 RX ADMIN — Medication 2 MILLIGRAM(S): at 07:12

## 2025-06-11 RX ADMIN — Medication 50 MILLIGRAM(S): at 23:28

## 2025-06-11 RX ADMIN — OXYCODONE HYDROCHLORIDE 7.5 MILLIGRAM(S): 30 TABLET ORAL at 18:20

## 2025-06-11 RX ADMIN — Medication 2 MILLIGRAM(S): at 17:25

## 2025-06-11 RX ADMIN — BICTEGRAVIR SODIUM, EMTRICITABINE, AND TENOFOVIR ALAFENAMIDE FUMARATE 1 TABLET(S): 50; 200; 25 TABLET ORAL at 12:12

## 2025-06-11 RX ADMIN — Medication 2 MILLIGRAM(S): at 19:06

## 2025-06-11 RX ADMIN — EZETIMIBE 10 MILLIGRAM(S): 10 TABLET ORAL at 23:28

## 2025-06-11 RX ADMIN — OXYCODONE HYDROCHLORIDE 7.5 MILLIGRAM(S): 30 TABLET ORAL at 19:20

## 2025-06-11 RX ADMIN — Medication 2 MILLIGRAM(S): at 16:25

## 2025-06-11 RX ADMIN — Medication 1 TABLET(S): at 12:12

## 2025-06-11 RX ADMIN — Medication 25 GRAM(S): at 15:20

## 2025-06-11 RX ADMIN — BUPROPION HYDROBROMIDE 150 MILLIGRAM(S): 522 TABLET, EXTENDED RELEASE ORAL at 12:12

## 2025-06-11 RX ADMIN — OXYCODONE HYDROCHLORIDE 5 MILLIGRAM(S): 30 TABLET ORAL at 16:19

## 2025-06-11 RX ADMIN — Medication 25 GRAM(S): at 07:16

## 2025-06-11 RX ADMIN — OXYCODONE HYDROCHLORIDE 5 MILLIGRAM(S): 30 TABLET ORAL at 06:45

## 2025-06-11 RX ADMIN — OXYCODONE HYDROCHLORIDE 5 MILLIGRAM(S): 30 TABLET ORAL at 23:31

## 2025-06-11 RX ADMIN — Medication 3.38 GRAM(S): at 06:30

## 2025-06-11 RX ADMIN — Medication 2 TABLET(S): at 23:32

## 2025-06-11 RX ADMIN — Medication 25 GRAM(S): at 23:05

## 2025-06-11 RX ADMIN — Medication 5 MILLIGRAM(S): at 23:27

## 2025-06-11 RX ADMIN — Medication 25 GRAM(S): at 02:31

## 2025-06-11 RX ADMIN — OXYCODONE HYDROCHLORIDE 5 MILLIGRAM(S): 30 TABLET ORAL at 05:45

## 2025-06-11 NOTE — PHYSICAL THERAPY INITIAL EVALUATION ADULT - GENERAL OBSERVATIONS, REHAB EVAL
Chart reviewed and cleared for PT by HA Cruz. Pt reiceved semi supine in bed in NAD, all lines intact + bilateral nephrostomy tube,

## 2025-06-11 NOTE — PROGRESS NOTE ADULT - PROBLEM SELECTOR PLAN 8
DVT ppx: SCD given hematuria  Diet: Regular  Dispo: Pending PT and Hospital course  Code Status: Need to review (was DNR/DNI during last hospitalization) DVT ppx: SCD given hematuria  Diet: Regular  Dispo: Pending PT and Hospital course  Code Status: Full Code

## 2025-06-11 NOTE — CHART NOTE - NSCHARTNOTEFT_GEN_A_CORE
IR consulted to evaluate right nephroureteral tube for broken suture. Patient seen and assessed at bedside. The suture of the right tube was noted to be torn. The catheter was resecured with a -lock dressing. The catheter was then flushed and noted to have appropriate output. No concern for malpositioning of tube. A new drainage bag was connected to the right neph-u tube. Now draining clear, mohan colored urine. The left neph-u tube is with appropriate output as well.     Plan:  - Continue to monitor urine output  - Continue to flush with 5ccs normal saline daily  - Reconsult prn    IR PA [id number]

## 2025-06-11 NOTE — PROGRESS NOTE ADULT - PROBLEM SELECTOR PLAN 5
- Continue home Ezetimibe  - Consider outpatient Lipid panel and Lp(a) assessment with cardiology I have fully discussed the medical management and delivery of care with the patient. I have discussed any available labs, imaging and treatment options with the patient.  Pt admitted for further care & management.

## 2025-06-11 NOTE — PROGRESS NOTE ADULT - SUBJECTIVE AND OBJECTIVE BOX
PROGRESS NOTE  Patient: CARL ROWLEY   Authored by Maximo Wong, PGY-1 (Internal Medicine)    INTERVAL HX:  - NAEON  -     Review of Systems:  Unchanged from prior unless noted above.    Allergies:  No Known Allergies      Physical Exam:  ********      Medications:  acetaminophen     Tablet .. 650 milliGRAM(s) Oral every 6 hours PRN  bictegravir 50 mG/emtricitabine 200 mG/tenofovir alafenamide 25 mG (BIKTARVY) 1 Tablet(s) Oral daily  buPROPion XL (24-Hour) . 150 milliGRAM(s) Oral daily  cholecalciferol 2000 Unit(s) Oral daily  clonazePAM  Tablet 1 milliGRAM(s) Oral two times a day PRN  diltiazem    milliGRAM(s) Oral daily  ezetimibe 10 milliGRAM(s) Oral at bedtime  melatonin 3 milliGRAM(s) Oral at bedtime PRN  morphine  - Injectable 2 milliGRAM(s) IV Push every 6 hours PRN  multivitamin 1 Tablet(s) Oral daily  oxyCODONE    IR 2.5 milliGRAM(s) Oral every 4 hours PRN  oxyCODONE    IR 5 milliGRAM(s) Oral every 4 hours PRN  piperacillin/tazobactam IVPB.. 3.375 Gram(s) IV Intermittent every 8 hours  polyethylene glycol 3350 17 Gram(s) Oral daily PRN  senna 2 Tablet(s) Oral at bedtime PRN  traZODone 50 milliGRAM(s) Oral at bedtime  zolpidem 5 milliGRAM(s) Oral at bedtime PRN  zolpidem 5 milliGRAM(s) Oral at bedtime PRN    Vitals:  T(C): 37 (06-11-25 @ 05:45), Max: 37.1 (06-10-25 @ 09:22)  HR: 99 (06-11-25 @ 05:45) (97 - 99)  BP: 100/70 (06-11-25 @ 05:45) (100/70 - 139/90)  RR: 17 (06-11-25 @ 05:45) (16 - 17)  SpO2: 99% (06-11-25 @ 05:45) (95% - 100%)  I/O's:    06-10-25 @ 07:01  -  06-11-25 @ 07:00  --------------------------------------------------------  IN: 500 mL / OUT: 1800 mL / NET: -1300 mL        Labs:                        12.5   7.22  )-----------( 323      ( 11 Jun 2025 05:32 )             37.7     06-10    139  |  106  |  18  ----------------------------<  96  4.0   |  23  |  0.80    Ca    9.0      10 Al 2025 09:49  Phos  2.6     06-10  Mg     2.00     06-10    TPro  7.0  /  Alb  3.6  /  TBili  0.2  /  DBili  x   /  AST  10  /  ALT  9   /  AlkPhos  92  06-10    PT/INR - ( 11 Jun 2025 05:32 )   PT: 12.4 sec;   INR: 1.07 ratio         PTT - ( 11 Jun 2025 05:32 )  PTT:30.8 sec    Urinalysis with Rflx Culture (collected 06-10-25 @ 09:50)        Radiology/Procedures: Reviewed.   PROGRESS NOTE  Patient: CARL ROWLEY   Authored by Maximo Wong, PGY-1 (Internal Medicine)    INTERVAL HX:  - NAEON  - Pain well controlled  - Decrased output in left tube  - Right tube with increased hematuria with clots in the bag, still flushing    Review of Systems:  Unchanged from prior unless noted above.    Allergies:  No Known Allergies      Physical Exam:  GENERAL: NAD, lying in bed comfortably  HEAD:  Atraumatic, Normocephalic  EYES: EOMI, conjunctiva and sclera clear  ENT: Moist mucous membranes  CHEST/LUNG: Clear to auscultation bilaterally; No rales, rhonchi, wheezing, or rubs. Unlabored respirations  HEART: Regular rate and rhythm; No murmurs, rubs, or gallops  ABDOMEN: Soft, ostomy present near umbilicus, tender to palpation near stoma, nondistended, L nephroueterostomy bag with decreased yellow output, R bag with bloody output and clots in the bag  EXTREMITIES:  No clubbing, cyanosis, or edema  NERVOUS SYSTEM:  A&Ox3, no focal deficits   SKIN: No rashes or lesions      Medications:  acetaminophen     Tablet .. 650 milliGRAM(s) Oral every 6 hours PRN  bictegravir 50 mG/emtricitabine 200 mG/tenofovir alafenamide 25 mG (BIKTARVY) 1 Tablet(s) Oral daily  buPROPion XL (24-Hour) . 150 milliGRAM(s) Oral daily  cholecalciferol 2000 Unit(s) Oral daily  clonazePAM  Tablet 1 milliGRAM(s) Oral two times a day PRN  diltiazem    milliGRAM(s) Oral daily  ezetimibe 10 milliGRAM(s) Oral at bedtime  melatonin 3 milliGRAM(s) Oral at bedtime PRN  morphine  - Injectable 2 milliGRAM(s) IV Push every 6 hours PRN  multivitamin 1 Tablet(s) Oral daily  oxyCODONE    IR 2.5 milliGRAM(s) Oral every 4 hours PRN  oxyCODONE    IR 5 milliGRAM(s) Oral every 4 hours PRN  piperacillin/tazobactam IVPB.. 3.375 Gram(s) IV Intermittent every 8 hours  polyethylene glycol 3350 17 Gram(s) Oral daily PRN  senna 2 Tablet(s) Oral at bedtime PRN  traZODone 50 milliGRAM(s) Oral at bedtime  zolpidem 5 milliGRAM(s) Oral at bedtime PRN  zolpidem 5 milliGRAM(s) Oral at bedtime PRN    Vitals:  T(C): 37 (06-11-25 @ 05:45), Max: 37.1 (06-10-25 @ 09:22)  HR: 99 (06-11-25 @ 05:45) (97 - 99)  BP: 100/70 (06-11-25 @ 05:45) (100/70 - 139/90)  RR: 17 (06-11-25 @ 05:45) (16 - 17)  SpO2: 99% (06-11-25 @ 05:45) (95% - 100%)  I/O's:    06-10-25 @ 07:01  -  06-11-25 @ 07:00  --------------------------------------------------------  IN: 500 mL / OUT: 1800 mL / NET: -1300 mL        Labs:                        12.5   7.22  )-----------( 323      ( 11 Jun 2025 05:32 )             37.7     06-10    139  |  106  |  18  ----------------------------<  96  4.0   |  23  |  0.80    Ca    9.0      10 Al 2025 09:49  Phos  2.6     06-10  Mg     2.00     06-10    TPro  7.0  /  Alb  3.6  /  TBili  0.2  /  DBili  x   /  AST  10  /  ALT  9   /  AlkPhos  92  06-10    PT/INR - ( 11 Jun 2025 05:32 )   PT: 12.4 sec;   INR: 1.07 ratio         PTT - ( 11 Jun 2025 05:32 )  PTT:30.8 sec    Urinalysis with Rflx Culture (collected 06-10-25 @ 09:50)        Radiology/Procedures: Reviewed.

## 2025-06-11 NOTE — CHART NOTE - NSCHARTNOTEFT_GEN_A_CORE
Called to reevaluate the blood in pt's Nephroureteral tubes.  L side is light red / brown drains spontaneously and flushes easily with 5-10 cc NS  R side is darker red but still draining spontaneously and flushed well with 10 cc of NS  R side neph-u tube the suture is broken and the tube protrudes from the skin a little more than the left.  Recommend IR consultation to evaluate R Neph-u tube placement

## 2025-06-11 NOTE — PROGRESS NOTE ADULT - PROBLEM SELECTOR PLAN 2
Dark colored in bilateral nephrostomy tubes. 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    - Urology following, appreciate recs  - Maintain active T&S  - trend hgb, transfuse PRN hgb <7  - BP regulation Dark colored in bilateral nephrostomy tubes. 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 Increased hematuria on 6/11 in R bag with clots.    - IR consulted for displaced site of R nephrostomy but is flushing so just monitor for now per IR team  - Urology following, states bloody output is okay  - Maintain active T&S  - trend hgb, transfuse PRN hgb <7  - BP regulation (SBP < 140)

## 2025-06-11 NOTE — PROGRESS NOTE ADULT - ATTENDING COMMENTS
Patient seen and examined with team.  Agree with above a/p by Dr Wong  59M w/ hx of prostate + anal cancer (s/p chemotherapy + radiation in 2020 c/b bilateral nephrostomy tubes and ostomy), HIV on Biktarvy, HTN, HLD, anxiety/depression presenting for bilateral flank pain and hematuria found to have positive UA and normal functioning nephrostomy tubes per urology suspicious for pyelonephritis. Treating with Zosyn for now given history of pseudomonas.  PE nad  Vital Signs Last 24 Hrs  T(F): 98.6 HR: 99 , BP: 100/70, RR: 17 , SpO2: 99 %room air  Lungs cta, cor rrr, abd soft n/t POSITIVE B/L neph tubes with blood and Positive colostomy bag    < from: CT Abdomen and Pelvis w/ IV Cont (06.10.25 @ 06:27) >  IMPRESSION:  Bilateral percutaneous nephroureteral tubes with distal tips coiled in   the bladder. No hydronephrosis.    UA 6/11 and 6/10 with Pos blood and pos leuk/nitrate    A/P  # ID UTI with ?? Pyelo?? doubt , complicated UTI.  c/w  Iv Zosyn , f/u urine cultures  # B/l Neph tubes. Urology greatly appreciated. No intervention planned. Has Hematuria.   t/s, keep hgb> 7  # HTN c/w Cardizem, plan to restart metoprolol 6/11 P bp is good.  #Pain. Tylenol  for mild pain. Start Oxy 5 mg q6 prm mod/severe. morphine 2 mg iv q6 prn breakthrough.   c/w  miralax/ senna.   plan d/w patient and team Patient seen and examined with team.  Agree with above a/p by Dr Wong  59M w/ hx of prostate + anal cancer (s/p chemotherapy + radiation in 2020 c/b bilateral nephrostomy tubes and ostomy), HIV on Biktarvy, HTN, HLD, anxiety/depression presenting for bilateral flank pain and hematuria found to have positive UA and normal functioning nephrostomy tubes per urology suspicious for pyelonephritis. Treating with Zosyn for now given history of pseudomonas.  PE nad  Vital Signs Last 24 Hrs  T(F): 98.6 HR: 99 , BP: 100/70, RR: 17 , SpO2: 99 %room air  Lungs cta, cor rrr, abd soft n/t POSITIVE B/L neph tubes with blood and Positive colostomy bag    < from: CT Abdomen and Pelvis w/ IV Cont (06.10.25 @ 06:27) >  IMPRESSION:  Bilateral percutaneous nephroureteral tubes with distal tips coiled in   the bladder. No hydronephrosis.    UA 6/11 and 6/10 with Pos blood and pos leuk/nitrate    A/P  # ID UTI with ?? Pyelo?? doubt , complicated UTI.  c/w  Iv Zosyn , f/u urine cultures  # B/l Neph tubes. Urology greatly appreciated. No intervention planned. Has Hematuria.   t/s, keep hgb> 7  # HTN c/w Cardizem, plan to restart metoprolol 6/11 P bp is good.  #Pain. Tylenol  for mild pain. Start Oxy 5 mg q6 prm mod/severe. morphine 2 mg iv q6 prn breakthrough.   c/w  miralax/ senna.   Will get neph tube check with IR   plan d/w patient and team Patient seen and examined with team.  Agree with above a/p by Dr Wong  59M w/ hx of prostate + anal cancer (s/p chemotherapy + radiation in 2020 c/b bilateral nephrostomy tubes and ostomy), HIV on Biktarvy, HTN, HLD, anxiety/depression presenting for bilateral flank pain and hematuria found to have positive UA and normal functioning nephrostomy tubes per urology suspicious for pyelonephritis. Treating with Zosyn for now given history of pseudomonas.    PE nad patient notes pain is better controlled with iv morphine. po oxy ir not helping  Patient noted IR already assesed his R perc Neph tube  Vital Signs Last 24 Hrs  T(F): 98.6 HR: 99 , BP: 100/70, RR: 17 , SpO2: 99 %room air  Lungs cta, cor rrr, abd soft n/t POSITIVE B/L neph tubes with blood and Positive colostomy bag    < from: CT Abdomen and Pelvis w/ IV Cont (06.10.25 @ 06:27) >  IMPRESSION:  Bilateral percutaneous nephroureteral tubes with distal tips coiled in   the bladder. No hydronephrosis.    UA 6/11 and 6/10 with Pos blood and pos leuk/nitrate    A/P  # ID UTI with ?? Pyelo?? doubt , complicated UTI.  c/w  Iv Zosyn , f/u urine cultures  # B/l Neph tubes. Urology greatly appreciated. No intervention planned. Has Hematuria.   t/s, keep hgb> 7  # HTN c/w Cardizem, plan to restart metoprolol 6/11 P bp is good.  #Pain. Tylenol  for mild pain.   increase Oxy 10 mg q6 prn mod/severe. morphine 2 mg iv q6 prn breakthrough.   c/w  MiraLax/ senna.   neph tube check with IR ---> already done 6/11  plan d/w patient and team

## 2025-06-11 NOTE — PROGRESS NOTE ADULT - PROBLEM SELECTOR PLAN 3
On diltiazem 240mg and metoprolol [id number] at home. Follows with cardiology but has no documented tachyarrhythmia history. Not septic during this admission.    - Continue home diltiazem 240 mg  - Hold home metoprolol succinate for now ISO infection  - inpatient BP goals:  - 160 (given hematuria)

## 2025-06-11 NOTE — PHYSICAL THERAPY INITIAL EVALUATION ADULT - ADDITIONAL COMMENTS
Pt lives with his wife in an apartment with no steps to enter. Pt did not use an assistive device and was independent with ADLs prior. Pt reports owning a rolling walker and a cane but does not use. Pt reports no falls in the past 6 months.  Pt left sitting in bedside chair in NAD, all lines intact, call hahn in reach and HA Cruz made aware.

## 2025-06-11 NOTE — PROGRESS NOTE ADULT - ASSESSMENT
59M w/ hx of prostate + anal cancer (s/p chemotherapy + radiation in 2020 c/b bilateral nephrostomy tubes and ostomy), HIV on Biktarvy, HTN, HLD, anxiety/depression presenting for bilateral flank pain and hematuria found to have positive UA and normal functioning nephrostomy tubes per urology suggestive of UTI. Treating with Zosyn for now given history of pseudomonas.   59M w/ hx of prostate + anal cancer (s/p chemotherapy + radiation in 2020 c/b bilateral nephrostomy tubes and ostomy), HIV on Biktarvy, HTN, HLD, anxiety/depression presenting for bilateral flank pain and hematuria found to have positive UA and normal functioning nephrostomy tubes per urology suggestive of UTI. Treating with Zosyn for now given history of pseudomonas. Course complicated by hematuria into R nephrourterostomy bag.

## 2025-06-11 NOTE — PHYSICAL THERAPY INITIAL EVALUATION ADULT - PERTINENT HX OF CURRENT PROBLEM, REHAB EVAL
Pt is a 59 year old male w/ a past medical history of prostate + anal cancer (s/p chemotherapy + radiation in 2020 c/b bilateral nephrostomy tubes and ostomy), HIV on Biktarvy, HTN, HLD, anxiety/depression presenting for bilateral flank pain and hematuria found to have positive UA and normal functioning nephrostomy tubes per urology suggestive of UTI. Treating with Zosyn for now given history of pseudomonas.

## 2025-06-12 ENCOUNTER — NON-APPOINTMENT (OUTPATIENT)
Age: 60
End: 2025-06-12

## 2025-06-12 DIAGNOSIS — T83.193A OTHER MECHANICAL COMPLICATION OF OTHER URINARY STENT, INITIAL ENCOUNTER: ICD-10-CM

## 2025-06-12 DIAGNOSIS — R00.0 TACHYCARDIA, UNSPECIFIED: ICD-10-CM

## 2025-06-12 LAB
ALBUMIN SERPL ELPH-MCNC: 3.8 G/DL — SIGNIFICANT CHANGE UP (ref 3.3–5)
ALP SERPL-CCNC: 91 U/L — SIGNIFICANT CHANGE UP (ref 40–120)
ALT FLD-CCNC: 14 U/L — SIGNIFICANT CHANGE UP (ref 4–41)
ANION GAP SERPL CALC-SCNC: 12 MMOL/L — SIGNIFICANT CHANGE UP (ref 7–14)
AST SERPL-CCNC: 14 U/L — SIGNIFICANT CHANGE UP (ref 4–40)
BASOPHILS # BLD AUTO: 0.05 K/UL — SIGNIFICANT CHANGE UP (ref 0–0.2)
BASOPHILS NFR BLD AUTO: 0.4 % — SIGNIFICANT CHANGE UP (ref 0–2)
BILIRUB SERPL-MCNC: 0.4 MG/DL — SIGNIFICANT CHANGE UP (ref 0.2–1.2)
BLOOD GAS VENOUS COMPREHENSIVE RESULT: SIGNIFICANT CHANGE UP
BUN SERPL-MCNC: 18 MG/DL — SIGNIFICANT CHANGE UP (ref 7–23)
CALCIUM SERPL-MCNC: 9.4 MG/DL — SIGNIFICANT CHANGE UP (ref 8.4–10.5)
CHLORIDE SERPL-SCNC: 101 MMOL/L — SIGNIFICANT CHANGE UP (ref 98–107)
CO2 SERPL-SCNC: 24 MMOL/L — SIGNIFICANT CHANGE UP (ref 22–31)
CREAT SERPL-MCNC: 1 MG/DL — SIGNIFICANT CHANGE UP (ref 0.5–1.3)
EGFR: 87 ML/MIN/1.73M2 — SIGNIFICANT CHANGE UP
EGFR: 87 ML/MIN/1.73M2 — SIGNIFICANT CHANGE UP
EOSINOPHIL # BLD AUTO: 0.02 K/UL — SIGNIFICANT CHANGE UP (ref 0–0.5)
EOSINOPHIL NFR BLD AUTO: 0.1 % — SIGNIFICANT CHANGE UP (ref 0–6)
GLUCOSE SERPL-MCNC: 137 MG/DL — HIGH (ref 70–99)
HCT VFR BLD CALC: 32.8 % — LOW (ref 39–50)
HCT VFR BLD CALC: 35.7 % — LOW (ref 39–50)
HGB BLD-MCNC: 11.5 G/DL — LOW (ref 13–17)
HGB BLD-MCNC: 12.2 G/DL — LOW (ref 13–17)
IANC: 8.66 K/UL — HIGH (ref 1.8–7.4)
IMM GRANULOCYTES NFR BLD AUTO: 0.8 % — SIGNIFICANT CHANGE UP (ref 0–0.9)
LYMPHOCYTES # BLD AUTO: 24.6 % — SIGNIFICANT CHANGE UP (ref 13–44)
LYMPHOCYTES # BLD AUTO: 3.34 K/UL — HIGH (ref 1–3.3)
MAGNESIUM SERPL-MCNC: 2.1 MG/DL — SIGNIFICANT CHANGE UP (ref 1.6–2.6)
MCHC RBC-ENTMCNC: 29.8 PG — SIGNIFICANT CHANGE UP (ref 27–34)
MCHC RBC-ENTMCNC: 30.2 PG — SIGNIFICANT CHANGE UP (ref 27–34)
MCHC RBC-ENTMCNC: 34.2 G/DL — SIGNIFICANT CHANGE UP (ref 32–36)
MCHC RBC-ENTMCNC: 35.1 G/DL — SIGNIFICANT CHANGE UP (ref 32–36)
MCV RBC AUTO: 85 FL — SIGNIFICANT CHANGE UP (ref 80–100)
MCV RBC AUTO: 88.4 FL — SIGNIFICANT CHANGE UP (ref 80–100)
MONOCYTES # BLD AUTO: 1.37 K/UL — HIGH (ref 0–0.9)
MONOCYTES NFR BLD AUTO: 10.1 % — SIGNIFICANT CHANGE UP (ref 2–14)
NEUTROPHILS # BLD AUTO: 8.66 K/UL — HIGH (ref 1.8–7.4)
NEUTROPHILS NFR BLD AUTO: 64 % — SIGNIFICANT CHANGE UP (ref 43–77)
NRBC # BLD AUTO: 0 K/UL — SIGNIFICANT CHANGE UP (ref 0–0)
NRBC # BLD AUTO: 0.02 K/UL — HIGH (ref 0–0)
NRBC # FLD: 0 K/UL — SIGNIFICANT CHANGE UP (ref 0–0)
NRBC # FLD: 0.02 K/UL — HIGH (ref 0–0)
NRBC BLD AUTO-RTO: 0 /100 WBCS — SIGNIFICANT CHANGE UP (ref 0–0)
NRBC BLD AUTO-RTO: 0 /100 WBCS — SIGNIFICANT CHANGE UP (ref 0–0)
PHOSPHATE SERPL-MCNC: 3.4 MG/DL — SIGNIFICANT CHANGE UP (ref 2.5–4.5)
PLATELET # BLD AUTO: 351 K/UL — SIGNIFICANT CHANGE UP (ref 150–400)
PLATELET # BLD AUTO: 397 K/UL — SIGNIFICANT CHANGE UP (ref 150–400)
POTASSIUM SERPL-MCNC: 3.7 MMOL/L — SIGNIFICANT CHANGE UP (ref 3.5–5.3)
POTASSIUM SERPL-SCNC: 3.7 MMOL/L — SIGNIFICANT CHANGE UP (ref 3.5–5.3)
PROT SERPL-MCNC: 7.1 G/DL — SIGNIFICANT CHANGE UP (ref 6–8.3)
RBC # BLD: 3.86 M/UL — LOW (ref 4.2–5.8)
RBC # BLD: 4.04 M/UL — LOW (ref 4.2–5.8)
RBC # FLD: 14 % — SIGNIFICANT CHANGE UP (ref 10.3–14.5)
RBC # FLD: 14.2 % — SIGNIFICANT CHANGE UP (ref 10.3–14.5)
SODIUM SERPL-SCNC: 137 MMOL/L — SIGNIFICANT CHANGE UP (ref 135–145)
WBC # BLD: 13.3 K/UL — HIGH (ref 3.8–10.5)
WBC # BLD: 15.37 K/UL — HIGH (ref 3.8–10.5)
WBC # FLD AUTO: 13.3 K/UL — HIGH (ref 3.8–10.5)
WBC # FLD AUTO: 15.37 K/UL — HIGH (ref 3.8–10.5)

## 2025-06-12 PROCEDURE — 99233 SBSQ HOSP IP/OBS HIGH 50: CPT | Mod: GC

## 2025-06-12 PROCEDURE — 99222 1ST HOSP IP/OBS MODERATE 55: CPT | Mod: GC

## 2025-06-12 PROCEDURE — G0545: CPT

## 2025-06-12 PROCEDURE — 93010 ELECTROCARDIOGRAM REPORT: CPT

## 2025-06-12 RX ORDER — CEFEPIME 2 G/20ML
INJECTION, POWDER, FOR SOLUTION INTRAVENOUS
Refills: 0 | Status: DISCONTINUED | OUTPATIENT
Start: 2025-06-12 | End: 2025-06-24

## 2025-06-12 RX ORDER — HYDROMORPHONE/SOD CHLOR,ISO/PF 2 MG/10 ML
0.5 SYRINGE (ML) INJECTION EVERY 4 HOURS
Refills: 0 | Status: DISCONTINUED | OUTPATIENT
Start: 2025-06-12 | End: 2025-06-12

## 2025-06-12 RX ORDER — CEFEPIME 2 G/20ML
2000 INJECTION, POWDER, FOR SOLUTION INTRAVENOUS EVERY 12 HOURS
Refills: 0 | Status: DISCONTINUED | OUTPATIENT
Start: 2025-06-13 | End: 2025-06-24

## 2025-06-12 RX ORDER — CEFEPIME 2 G/20ML
2000 INJECTION, POWDER, FOR SOLUTION INTRAVENOUS ONCE
Refills: 0 | Status: COMPLETED | OUTPATIENT
Start: 2025-06-12 | End: 2025-06-12

## 2025-06-12 RX ORDER — OXYCODONE HYDROCHLORIDE 30 MG/1
5 TABLET ORAL EVERY 4 HOURS
Refills: 0 | Status: DISCONTINUED | OUTPATIENT
Start: 2025-06-12 | End: 2025-06-17

## 2025-06-12 RX ORDER — SODIUM CHLORIDE 9 G/1000ML
1000 INJECTION, SOLUTION INTRAVENOUS ONCE
Refills: 0 | Status: COMPLETED | OUTPATIENT
Start: 2025-06-12 | End: 2025-06-12

## 2025-06-12 RX ORDER — HYDROMORPHONE/SOD CHLOR,ISO/PF 2 MG/10 ML
0.5 SYRINGE (ML) INJECTION ONCE
Refills: 0 | Status: DISCONTINUED | OUTPATIENT
Start: 2025-06-12 | End: 2025-06-12

## 2025-06-12 RX ORDER — BISACODYL 5 MG
5 TABLET, DELAYED RELEASE (ENTERIC COATED) ORAL EVERY 12 HOURS
Refills: 0 | Status: DISCONTINUED | OUTPATIENT
Start: 2025-06-12 | End: 2025-06-24

## 2025-06-12 RX ORDER — CLONAZEPAM 0.5 MG/1
1 TABLET ORAL
Refills: 0 | Status: DISCONTINUED | OUTPATIENT
Start: 2025-06-12 | End: 2025-06-17

## 2025-06-12 RX ORDER — POLYETHYLENE GLYCOL 3350 17 G/17G
17 POWDER, FOR SOLUTION ORAL
Refills: 0 | Status: DISCONTINUED | OUTPATIENT
Start: 2025-06-12 | End: 2025-06-14

## 2025-06-12 RX ORDER — METOPROLOL SUCCINATE 50 MG/1
25 TABLET, EXTENDED RELEASE ORAL
Refills: 0 | Status: DISCONTINUED | OUTPATIENT
Start: 2025-06-12 | End: 2025-06-13

## 2025-06-12 RX ADMIN — EZETIMIBE 10 MILLIGRAM(S): 10 TABLET ORAL at 21:37

## 2025-06-12 RX ADMIN — Medication 2000 UNIT(S): at 11:45

## 2025-06-12 RX ADMIN — OXYCODONE HYDROCHLORIDE 5 MILLIGRAM(S): 30 TABLET ORAL at 10:51

## 2025-06-12 RX ADMIN — Medication 25 GRAM(S): at 06:16

## 2025-06-12 RX ADMIN — Medication 2 MILLIGRAM(S): at 12:14

## 2025-06-12 RX ADMIN — DILTIAZEM HYDROCHLORIDE 240 MILLIGRAM(S): 240 TABLET, EXTENDED RELEASE ORAL at 06:16

## 2025-06-12 RX ADMIN — Medication 25 GRAM(S): at 14:46

## 2025-06-12 RX ADMIN — Medication 2 MILLIGRAM(S): at 17:24

## 2025-06-12 RX ADMIN — SODIUM CHLORIDE 1000 MILLILITER(S): 9 INJECTION, SOLUTION INTRAVENOUS at 11:45

## 2025-06-12 RX ADMIN — Medication 2 MILLIGRAM(S): at 11:44

## 2025-06-12 RX ADMIN — Medication 50 MILLIGRAM(S): at 21:37

## 2025-06-12 RX ADMIN — Medication 2 MILLIGRAM(S): at 19:46

## 2025-06-12 RX ADMIN — OXYCODONE HYDROCHLORIDE 5 MILLIGRAM(S): 30 TABLET ORAL at 00:31

## 2025-06-12 RX ADMIN — Medication 2 MILLIGRAM(S): at 17:54

## 2025-06-12 RX ADMIN — METOPROLOL SUCCINATE 25 MILLIGRAM(S): 50 TABLET, EXTENDED RELEASE ORAL at 17:25

## 2025-06-12 RX ADMIN — OXYCODONE HYDROCHLORIDE 7.5 MILLIGRAM(S): 30 TABLET ORAL at 04:13

## 2025-06-12 RX ADMIN — BUPROPION HYDROBROMIDE 150 MILLIGRAM(S): 522 TABLET, EXTENDED RELEASE ORAL at 11:44

## 2025-06-12 RX ADMIN — Medication 1 TABLET(S): at 11:45

## 2025-06-12 RX ADMIN — Medication 2 MILLIGRAM(S): at 20:16

## 2025-06-12 RX ADMIN — BICTEGRAVIR SODIUM, EMTRICITABINE, AND TENOFOVIR ALAFENAMIDE FUMARATE 1 TABLET(S): 50; 200; 25 TABLET ORAL at 11:44

## 2025-06-12 RX ADMIN — OXYCODONE HYDROCHLORIDE 7.5 MILLIGRAM(S): 30 TABLET ORAL at 03:13

## 2025-06-12 RX ADMIN — OXYCODONE HYDROCHLORIDE 5 MILLIGRAM(S): 30 TABLET ORAL at 09:51

## 2025-06-12 RX ADMIN — Medication 5 MILLIGRAM(S): at 17:38

## 2025-06-12 RX ADMIN — Medication 5 MILLIGRAM(S): at 21:36

## 2025-06-12 RX ADMIN — Medication 2 TABLET(S): at 21:36

## 2025-06-12 RX ADMIN — CEFEPIME 100 MILLIGRAM(S): 2 INJECTION, POWDER, FOR SOLUTION INTRAVENOUS at 17:24

## 2025-06-12 NOTE — CONSULT NOTE ADULT - ATTENDING COMMENTS
This is a 58 y/o M w/ PMhx of HTN, HLD, anxiety/depression, HIV on Biktarvy (CD4 624 in 12/2024 and VL UD 5/25),  h/o anal and prostate Ca (s/p chemotherapy + radiation in 2020 c/b bilateral nephrostomy tubes and ostomy),  ureteral stricture s/p stenting and bow with b/l nephroureteral tubes (last exchanged 5/9), recent parastomal hernia repair with mesh in 4/2025 admitted to [facility] on 6/10 for b/l flank pain, hematuria.   Pt afebrile, initially w/o leukocytosis, now 13, UCx w/ GNR.  CT A/P with b/l PCT with distal tips coiled in the bladder.    #B/l flank pain in the setting of b/l PCN  #Mild leukocytosis   #UCx w/ GNR  #HIV, on Biktarvy     Overall, 8 y/o M w/ PMhx of HTN, HLD, anxiety/depression, HIV on Biktarvy (CD4 624 in 12/2024 and VL UD 5/25),  h/o anal and prostate Ca (s/p chemotherapy + radiation in 2020 c/b bilateral nephrostomy tubes and ostomy),  ureteral stricture s/p stenting and bow with b/l nephroureteral tubes (last exchanged 5/9), recent parastomal hernia repair with mesh in 4/2025 admitted to [facility] on 6/10 for b/l flank pain, hematuria.   Pt afebrile, initially w/o leukocytosis, now w/ mild leukocytosis to 13. IR consulted for b/l drain, noted to be flushing, and not needed to be changed.   Unclear if patient has an infection, U/A, UCx will be positive regardless from a PCN. No fever, CT A/P w/o perinephric stranding.   On exam, pt with some tenderness at site of PCNs, gross blood in both nephrostomy bags.     Recommend:   1. Cefepime 2 g q12 to cover organisms grown in past (enterobacter, pseudomonas)   2. C/w Biktarvy, HIV well controlled   3. F/u UCx, GNR  4. UTI would not cause gross blood to be in nephrostomy, would defer to urology/IR re: any intervention   5. Obtain BCx x 2, however yield would likely be low given pt already on abx     Thank you for consulting us and involving us in the management of this patient's case. In addition to reviewing history, imaging, documents, labs, microbiology, and infection control strategies and potential issues.     ID will continue to follow    Sarwat Chan M.D.  Attending Physician  Division of Infectious Diseases  Department of Medicine    Please contact through MS Teams message.  Office: [phone] (after 5 PM or weekend) This is a 58 y/o M w/ PMhx of HTN, HLD, anxiety/depression, HIV on Biktarvy (CD4 624 in 12/2024 and VL UD 5/25),  h/o anal and prostate Ca (s/p chemotherapy + radiation in 2020 c/b bilateral nephrostomy tubes and ostomy),  ureteral stricture s/p stenting and bow with b/l nephroureteral tubes (last exchanged 5/9), recent parastomal hernia repair with mesh in 4/2025 admitted to [facility] on 6/10 for b/l flank pain, hematuria.   Pt afebrile, initially w/o leukocytosis, now 13, UCx w/ GNR.  CT A/P with b/l PCT with distal tips coiled in the bladder.    #B/l flank pain in the setting of b/l PCN  #Mild leukocytosis   #Syphilis   #UCx w/ GNR  #HIV, on Biktarvy     Overall, 58 y/o M w/ PMhx of HTN, HLD, anxiety/depression, HIV on Biktarvy (CD4 624 in 12/2024 and VL UD 5/25),  h/o anal and prostate Ca (s/p chemotherapy + radiation in 2020 c/b bilateral nephrostomy tubes and ostomy),  ureteral stricture s/p stenting and bow with b/l nephroureteral tubes (last exchanged 5/9), recent parastomal hernia repair with mesh in 4/2025 admitted to [facility] on 6/10 for b/l flank pain, hematuria.   Pt afebrile, initially w/o leukocytosis, now w/ mild leukocytosis to 13. IR consulted for b/l drain, noted to be flushing, and not needed to be changed.   Unclear if patient has an infection, U/A, UCx will be positive regardless from a PCN. No fever, CT A/P w/o perinephric stranding.   On exam, pt with some tenderness at site of PCNs, gross blood in both nephrostomy bags.     D/w outpatient ID doctor Dr. 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 Will repeat RPR in the AM, consider Doxycyline?     Recommend:   1. Cefepime 2 g q12 to cover organisms grown in past (enterobacter, pseudomonas)   2. C/w Biktarvy, HIV well controlled   3. F/u UCx, GNR  4. UTI would not cause gross blood to be in nephrostomy, would defer to urology/IR re: any intervention   5. Obtain BCx x 2, however yield would likely be low given pt already on abx   6. RPR sent in the AM, consider repeat IM Bicillin vs Doxycycline     Thank you for consulting us and involving us in the management of this patient's case. In addition to reviewing history, imaging, documents, labs, microbiology, and infection control strategies and potential issues.     ID will continue to follow    Sarwat Chan M.D.  Attending Physician  Division of Infectious Diseases  Department of Medicine    Please contact through MS Teams message.  Office: [phone] (after 5 PM or weekend)

## 2025-06-12 NOTE — CHART NOTE - NSCHARTNOTEFT_GEN_A_CORE
Discussed w/ Urology and IR teams regarding persistent dark hematuria in BULMARO nephrostomy tube. Given patient's radiation therapy hx for prostate cancer w/ known damage to ureters, it is not unexpected from their perspective for patient to have large amount of blood from nephrostomy tube. At this time, concern for tube malfunction or displacement is low given flushing well and positioning checked w/ CT.   Will plan to monitor CBC Q12 while hematuria persists. If H&H dropping, will order CTA abdomen arterial/venous phase and consult IR on call team.

## 2025-06-12 NOTE — PROGRESS NOTE ADULT - ASSESSMENT
59M w/ hx of prostate + anal cancer (s/p chemotherapy + radiation in 2020 c/b bilateral nephrostomy tubes and ostomy), HIV on Biktarvy, HTN, HLD, anxiety/depression presenting for bilateral flank pain and hematuria found to have positive UA and normal functioning nephrostomy tubes per urology suggestive of UTI. Treating with Zosyn for now given history of pseudomonas. Course complicated by hematuria into R nephrourterostomy bag.

## 2025-06-12 NOTE — CONSULT NOTE ADULT - SUBJECTIVE AND OBJECTIVE BOX
Patient is a 59 year old male with PMH of HTN, HLD, anxiety/depression, HIV on Biktarvy( CD4 624 in 12/2024 and rey load undetectable in 5/2025),  h/o anal and prostate Ca (s/p chemotherapy + radiation in 2020 c/b bilateral nephrostomy tubes tubes and ostomy),  ureteral stricture s/p stenting and bow with b/l nephroureteral tubes (last exchanged 5/9), recent parastomal hernia repair with mesh in 4/2025  presenting for bilateral flank pain and hematuria.Patient reports that he had a abdominal hernia mesh last month in May around his ostomy site. For the last two weeks he has had pain that started at the stoma which then progressed to Right then left bilateral flank pain at his nephrostomy tube insertion sites. Patient came to the ED when he developed bloody output into both nephrostomy tubes (at baseline is normal yellow urine color) and pain was no longer managable with Tylenol. Patient denies any fevers chills, weight loss, SOB, URI symptoms, leg swelling.  Patient follows with urologist Dr. Mathew, oncologist Dr. Andres Funez, and a surgeon.   Of note, patient was admitted  one month prior for poor nephrostomy drainage and urosepsis. At baseline he has no urine output through the penis but he will urinate through the penis if one or more tubes become blocked.    In thr ER:   VSS  Labs: CBC without leukocytosis. CMP with creatinne 0.80 ( baseline)> U/A x 2 with large LE, positive nitrites and >100 wbcs. Urine culture with >100,000 gram negative rods. Prior urine culturres with providencia rettgeri (4/2025), klebseilla pneumoniae ( 8/2024), pseudomonas in 7/2024, enterobacter cloacae ( 7/2024), carbapenem resistant pseudomonas in 4/2024,   CT abdomen pelvis: Bilateral percutaneous nephroureteral tubes with distal tips coiled in   the bladder. No hydronephrosis.    Urology consulted; no acute intervention.     ID called for rising leukocytosis today to 13 and tachycardia. Of note, IR was consutled yesterday to evaluate right nephroureteral tube for broken suture; the catheter was rescecured and flushed with no concern for malpositioning and a new drainage bad was conncted.     Abx:   Zosyn ( 6/10-)   -s/p Ceftriaxone 6/10     REVIEW OF SYSTEMS  pending full examination    prior hospital charts reviewed [V]  primary team notes reviewed [V]  other consultant notes reviewed [V]    PAST MEDICAL & SURGICAL HISTORY:  HTN (hypertension)      HIV infection      Anxiety      History of anal cancer      History of prostate cancer      HLD (hyperlipidemia)      Parastomal hernia with obstruction and without gangrene      S/P colostomy      Nephrostomy present          SOCIAL HISTORY:  Denied smoking/vaping/alcohol/recreational drug use    FAMILY HISTORY:  FH: prostate cancer    FH: breast cancer        Allergies  No Known Allergies        ANTIMICROBIALS:  bictegravir 50 mG/emtricitabine 200 mG/tenofovir alafenamide 25 mG (BIKTARVY) 1 daily  piperacillin/tazobactam IVPB.. 3.375 every 8 hours      ANTIMICROBIALS (past 90 days):  MEDICATIONS  (STANDING):  bictegravir 50 mG/emtricitabine 200 mG/tenofovir alafenamide 25 mG (BIKTARVY)   1 Tablet(s) Oral (06-12-25 @ 11:44)   1 Tablet(s) Oral (06-11-25 @ 12:12)   1 Tablet(s) Oral (06-10-25 @ 18:39)    cefTRIAXone   IVPB   100 mL/Hr IV Intermittent (06-10-25 @ 12:15)    piperacillin/tazobactam IVPB.   200 mL/Hr IV Intermittent (06-10-25 @ 16:25)    piperacillin/tazobactam IVPB.-   25 mL/Hr IV Intermittent (06-10-25 @ 18:39)    piperacillin/tazobactam IVPB.-   25 mL/Hr IV Intermittent (06-11-25 @ 02:31)    piperacillin/tazobactam IVPB.-   25 mL/Hr IV Intermittent (06-11-25 @ 07:16)    piperacillin/tazobactam IVPB..   25 mL/Hr IV Intermittent (06-12-25 @ 14:46)   25 mL/Hr IV Intermittent (06-12-25 @ 06:16)   25 mL/Hr IV Intermittent (06-11-25 @ 23:05)   25 mL/Hr IV Intermittent (06-11-25 @ 15:20)        OTHER MEDS:   MEDICATIONS  (STANDING):  acetaminophen     Tablet .. 650 every 6 hours PRN  bisacodyl 5 every 12 hours  buPROPion XL (24-Hour) . 150 daily  clonazePAM  Tablet 1 two times a day PRN  diltiazem    daily  ezetimibe 10 at bedtime  melatonin 3 at bedtime PRN  metoprolol tartrate 25 two times a day  morphine  - Injectable 2 every 4 hours PRN  oxyCODONE    IR 5 every 4 hours PRN  polyethylene glycol 3350 17 two times a day PRN  senna 2 at bedtime PRN  traZODone 50 at bedtime  zolpidem 5 at bedtime PRN  zolpidem 5 at bedtime PRN      VITALS:  Vital Signs Last 24 Hrs  T(F): 97.9 (06-12-25 @ 12:30), Max: 99.9 (06-12-25 @ 05:45)    Vital Signs Last 24 Hrs  HR: 117 (06-12-25 @ 12:30) (117 - 136)  BP: 125/88 (06-12-25 @ 12:30) (109/79 - 135/94)  RR: 18 (06-12-25 @ 12:30)  SpO2: 100% (06-12-25 @ 12:30) (99% - 100%)  Wt(kg): --    EXAM:  pending full examination      Labs:                        12.2   13.30 )-----------( 351      ( 12 Jun 2025 07:05 )             35.7     06-12    137  |  101  |  18  ----------------------------<  137[H]  3.7   |  24  |  1.00    Ca    9.4      12 Jun 2025 07:05  Phos  3.4     06-12  Mg     2.10     06-12    TPro  7.1  /  Alb  3.8  /  TBili  0.4  /  DBili  x   /  AST  14  /  ALT  14  /  AlkPhos  91  06-12      WBC Trend:  WBC Count: 13.30 (06-12-25 @ 07:05)  WBC Count: 7.22 (06-11-25 @ 05:32)  WBC Count: 8.47 (06-10-25 @ 23:30)  WBC Count: 8.31 (06-10-25 @ 03:22)      Auto Neutrophil #: 5.87 K/uL (12-30-24 @ 10:15)  Auto Neutrophil #: 6.24 K/uL (09-01-24 @ 06:10)      Creatine Trend:  Creatinine: 1.00 (06-12)  Creatinine: 0.93 (06-11)  Creatinine: 0.80 (06-10)      Liver Biochemical Testing Trend:  Alanine Aminotransferase (ALT/SGPT): 14 (06-12)  Alanine Aminotransferase (ALT/SGPT): 11 (06-11)  Alanine Aminotransferase (ALT/SGPT): 9 (06-10)  Alanine Aminotransferase (ALT/SGPT): 19 (05-12)  Alanine Aminotransferase (ALT/SGPT): 6 (05-11)  Aspartate Aminotransferase (AST/SGOT): 14 (06-12-25 @ 07:05)  Aspartate Aminotransferase (AST/SGOT): 13 (06-11-25 @ 05:32)  Aspartate Aminotransferase (AST/SGOT): 10 (06-10-25 @ 09:49)  Aspartate Aminotransferase (AST/SGOT): 19 (05-12-25 @ 06:30)  Aspartate Aminotransferase (AST/SGOT): 8 (05-11-25 @ 05:40)  Bilirubin Total: 0.4 (06-12)  Bilirubin Total: 0.3 (06-11)  Bilirubin Total: 0.2 (06-10)  Bilirubin Direct: <0.2 (05-12)  Bilirubin Total: 0.2 (05-12)      Trend LDH          MICROBIOLOGY:        Urinalysis with Rflx Culture (collected 10 Al 2025 09:50)    Urinalysis with Rflx Culture (collected 10 Al 2025 08:00)    Culture - Urine (collected 10 Al 2025 08:00)  Source: Urine  Preliminary Report:    >100,000 CFU/ml Gram Negative Rods    Culture - Urine (collected 10 May 2025 11:41)  Source: Urine  Final Report:    No growth    Culture - Fungal, Other (collected 10 May 2025 11:41)  Source: Other  Final Report:    No fungus isolated at 2 weeks.    Culture - Urine (collected 10 May 2025 11:23)  Source: Urine  Final Report:    No growth    Culture - Fungal, Other (collected 10 May 2025 11:23)  Source: Other  Final Report:    No fungus isolated at 2 weeks.    Urinalysis with Rflx Culture (collected 10 May 2025 07:09)    Culture - Urine (collected 10 May 2025 05:30)  Source: Clean Catch  Final Report:    No growth    Culture - Blood (collected 10 May 2025 02:30)  Source: Blood Blood  Final Report:    No growth at 5 days        ABS CD4: 745 cells/uL (09-26-24 @ 19:52)  ABS CD4: 812 cells/uL (07-18-24 @ 11:25)  ABS CD4: 548 cells/uL (04-18-24 @ 16:17)  ABS CD4: 608 cells/uL (02-12-24 @ 09:46)  ABS CD4: 579 cells/uL (12-11-23 @ 21:15)    HIV-1 RNA Quantitative, Viral Load: NOT DET. copies/mL (05-10-25 @ 02:15)  HIV-1 Viral Load Result: NOT DET. (05-10-25 @ 02:15)  HIV-1 RNA Quantitative, Viral Load: NOT DET. copies/mL (09-26-24 @ 19:52)  HIV-1 Viral Load Result: NOT DET. (09-26-24 @ 19:52)  HIV-1 RNA Quantitative, Viral Load: 32 (07-18-24 @ 11:25)                                      Blood Gas Venous - Lactate: 1.8 (06-12 @ 11:55)    A1C with Estimated Average Glucose Result: 5.9 % (04-07-25 @ 11:45)      RADIOLOGY:  imaging below personally reviewed   Patient is a 59 year old male with PMH of HTN, HLD, anxiety/depression, HIV on Biktarvy( CD4 624 in 12/2024 and rey load undetectable in 5/2025), h/o anal and prostate Ca (s/p chemotherapy + radiation in 2020 c/b bilateral nephrostomy tubes tubes and ostomy),  ureteral stricture s/p stenting and bow with b/l nephroureteral tubes (last exchanged 5/9), recent parastomal hernia repair with mesh in 4/2025  presenting for bilateral flank pain and hematuria. Patient states he started having pain around the stoma site and radiating to the b/l flanks 2 weeks ago and then 1 week ago he noted hematuria in b/l nephrostomy tubes with worsenign pain so he came to the hospital. No fevers, chills, nausea, vomiting, prior to hospitalization, States no change in output from ostomy bag.     In thr ER:   VSS  Labs: CBC without leukocytosis. CMP with creatinine 0.80 ( baseline), U/A x 2 with large LE, positive nitrites and >100 wbcs. Urine culture with >100,000 gram negative rods. Prior urine culturess with providencia rettgeri (4/2025), klebsiella pneumoniae ( 8/2024), pseudomonas in 7/2024, enterobacter cloacae ( 7/2024), pseudomonas in 4/2024 (resistant only to imipenem of carbapenems).   CT abdomen pelvis: Bilateral percutaneous nephroureteral tubes with distal tips coiled in the bladder. No hydronephrosis.    Urology consulted; no acute intervention.     ID called for rising leukocytosis today to 13 and tachycardia. Of note, IR was consutled yesterday to evaluate right nephroureteral tube for broken suture; the catheter was rescecured and flushed with no concern for malpositioning and a new drainage bad was conncted.     Abx:   Zosyn ( 6/10-)   -s/p Ceftriaxone 6/10     REVIEW OF SYSTEMS  Constitutional: No fevers, No chills  Respiratory: No cough, no SOB  Cardiovascular:  No chest pain, No palpitations   Gastrointestinal: + pain, + nausea, No vomiting, No diarrhea, No constipation	  Genitourinary: No dysuria, No frequency, No hesitancy, + flank pain  MSK: No Joint pain, No back pain, No edema  Neurological: No HA, no weakness, no seizures, no AMS     prior hospital charts reviewed [V]  primary team notes reviewed [V]  other consultant notes reviewed [V]    PAST MEDICAL & SURGICAL HISTORY:  HTN (hypertension)      HIV infection      Anxiety      History of anal cancer      History of prostate cancer      HLD (hyperlipidemia)      Parastomal hernia with obstruction and without gangrene      S/P colostomy      Nephrostomy present          SOCIAL HISTORY:  +former smoker   -denies etoh use anddrug use   Denied smoking/vaping/alcohol/recreational drug use    FAMILY HISTORY:  FH: prostate cancer    FH: breast cancer        Allergies  No Known Allergies        ANTIMICROBIALS:  bictegravir 50 mG/emtricitabine 200 mG/tenofovir alafenamide 25 mG (BIKTARVY) 1 daily  piperacillin/tazobactam IVPB.. 3.375 every 8 hours      ANTIMICROBIALS (past 90 days):  MEDICATIONS  (STANDING):  bictegravir 50 mG/emtricitabine 200 mG/tenofovir alafenamide 25 mG (BIKTARVY)   1 Tablet(s) Oral (06-12-25 @ 11:44)   1 Tablet(s) Oral (06-11-25 @ 12:12)   1 Tablet(s) Oral (06-10-25 @ 18:39)    cefTRIAXone   IVPB   100 mL/Hr IV Intermittent (06-10-25 @ 12:15)    piperacillin/tazobactam IVPB.   200 mL/Hr IV Intermittent (06-10-25 @ 16:25)    piperacillin/tazobactam IVPB.-   25 mL/Hr IV Intermittent (06-10-25 @ 18:39)    piperacillin/tazobactam IVPB.-   25 mL/Hr IV Intermittent (06-11-25 @ 02:31)    piperacillin/tazobactam IVPB.-   25 mL/Hr IV Intermittent (06-11-25 @ 07:16)    piperacillin/tazobactam IVPB..   25 mL/Hr IV Intermittent (06-12-25 @ 14:46)   25 mL/Hr IV Intermittent (06-12-25 @ 06:16)   25 mL/Hr IV Intermittent (06-11-25 @ 23:05)   25 mL/Hr IV Intermittent (06-11-25 @ 15:20)        OTHER MEDS:   MEDICATIONS  (STANDING):  acetaminophen     Tablet .. 650 every 6 hours PRN  bisacodyl 5 every 12 hours  buPROPion XL (24-Hour) . 150 daily  clonazePAM  Tablet 1 two times a day PRN  diltiazem    daily  ezetimibe 10 at bedtime  melatonin 3 at bedtime PRN  metoprolol tartrate 25 two times a day  morphine  - Injectable 2 every 4 hours PRN  oxyCODONE    IR 5 every 4 hours PRN  polyethylene glycol 3350 17 two times a day PRN  senna 2 at bedtime PRN  traZODone 50 at bedtime  zolpidem 5 at bedtime PRN  zolpidem 5 at bedtime PRN      VITALS:  Vital Signs Last 24 Hrs  T(F): 97.9 (06-12-25 @ 12:30), Max: 99.9 (06-12-25 @ 05:45)    Vital Signs Last 24 Hrs  HR: 117 (06-12-25 @ 12:30) (117 - 136)  BP: 125/88 (06-12-25 @ 12:30) (109/79 - 135/94)  RR: 18 (06-12-25 @ 12:30)  SpO2: 100% (06-12-25 @ 12:30) (99% - 100%)  Wt(kg): --    EXAM:  General: Patient appears comfortable, no acute distress  HEENT: NCAT  CV: +S1/S2, tachycardic   Lungs: No respiratory distress, CTA b/l  Abd:  +tenderness to palpation around stoma and b/l CVA tenderness. BS4+, Soft,, no guarding. +nephroureteral tube sites c/d/i with bloody output bilaterally.   Neuro: AAOx3. No focal deficits noted.   Ext: No cyanosis, no edema  Msk: freely moving upper and lower extremities  Skin: No rash, no phlebitis, No erythema       Labs:                        12.2   13.30 )-----------( 351      ( 12 Jun 2025 07:05 )             35.7     06-12    137  |  101  |  18  ----------------------------<  137[H]  3.7   |  24  |  1.00    Ca    9.4      12 Jun 2025 07:05  Phos  3.4     06-12  Mg     2.10     06-12    TPro  7.1  /  Alb  3.8  /  TBili  0.4  /  DBili  x   /  AST  14  /  ALT  14  /  AlkPhos  91  06-12      WBC Trend:  WBC Count: 13.30 (06-12-25 @ 07:05)  WBC Count: 7.22 (06-11-25 @ 05:32)  WBC Count: 8.47 (06-10-25 @ 23:30)  WBC Count: 8.31 (06-10-25 @ 03:22)      Auto Neutrophil #: 5.87 K/uL (12-30-24 @ 10:15)  Auto Neutrophil #: 6.24 K/uL (09-01-24 @ 06:10)      Creatine Trend:  Creatinine: 1.00 (06-12)  Creatinine: 0.93 (06-11)  Creatinine: 0.80 (06-10)      Liver Biochemical Testing Trend:  Alanine Aminotransferase (ALT/SGPT): 14 (06-12)  Alanine Aminotransferase (ALT/SGPT): 11 (06-11)  Alanine Aminotransferase (ALT/SGPT): 9 (06-10)  Alanine Aminotransferase (ALT/SGPT): 19 (05-12)  Alanine Aminotransferase (ALT/SGPT): 6 (05-11)  Aspartate Aminotransferase (AST/SGOT): 14 (06-12-25 @ 07:05)  Aspartate Aminotransferase (AST/SGOT): 13 (06-11-25 @ 05:32)  Aspartate Aminotransferase (AST/SGOT): 10 (06-10-25 @ 09:49)  Aspartate Aminotransferase (AST/SGOT): 19 (05-12-25 @ 06:30)  Aspartate Aminotransferase (AST/SGOT): 8 (05-11-25 @ 05:40)  Bilirubin Total: 0.4 (06-12)  Bilirubin Total: 0.3 (06-11)  Bilirubin Total: 0.2 (06-10)  Bilirubin Direct: <0.2 (05-12)  Bilirubin Total: 0.2 (05-12)      Trend LDH          MICROBIOLOGY:        Urinalysis with Rflx Culture (collected 10 Al 2025 09:50)    Urinalysis with Rflx Culture (collected 10 Al 2025 08:00)    Culture - Urine (collected 10 Al 2025 08:00)  Source: Urine  Preliminary Report:    >100,000 CFU/ml Gram Negative Rods    Culture - Urine (collected 10 May 2025 11:41)  Source: Urine  Final Report:    No growth    Culture - Fungal, Other (collected 10 May 2025 11:41)  Source: Other  Final Report:    No fungus isolated at 2 weeks.    Culture - Urine (collected 10 May 2025 11:23)  Source: Urine  Final Report:    No growth    Culture - Fungal, Other (collected 10 May 2025 11:23)  Source: Other  Final Report:    No fungus isolated at 2 weeks.    Urinalysis with Rflx Culture (collected 10 May 2025 07:09)    Culture - Urine (collected 10 May 2025 05:30)  Source: Clean Catch  Final Report:    No growth    Culture - Blood (collected 10 May 2025 02:30)  Source: Blood Blood  Final Report:    No growth at 5 days        ABS CD4: 745 cells/uL (09-26-24 @ 19:52)  ABS CD4: 812 cells/uL (07-18-24 @ 11:25)  ABS CD4: 548 cells/uL (04-18-24 @ 16:17)  ABS CD4: 608 cells/uL (02-12-24 @ 09:46)  ABS CD4: 579 cells/uL (12-11-23 @ 21:15)    HIV-1 RNA Quantitative, Viral Load: NOT DET. copies/mL (05-10-25 @ 02:15)  HIV-1 Viral Load Result: NOT DET. (05-10-25 @ 02:15)  HIV-1 RNA Quantitative, Viral Load: NOT DET. copies/mL (09-26-24 @ 19:52)  HIV-1 Viral Load Result: NOT DET. (09-26-24 @ 19:52)  HIV-1 RNA Quantitative, Viral Load: 32 (07-18-24 @ 11:25)                                      Blood Gas Venous - Lactate: 1.8 (06-12 @ 11:55)    A1C with Estimated Average Glucose Result: 5.9 % (04-07-25 @ 11:45)      RADIOLOGY:    ACC: [id number] EXAM:  CT ABDOMEN AND PELVIS IC   ORDERED BY: RICH GARZA     PROCEDURE DATE:  06/10/2025          INTERPRETATION:  CLINICAL INFORMATION: Assess nephrostomy tube placement.    COMPARISON: X-ray abdomen 5/11/2025.    CONTRAST/COMPLICATIONS:  IV Contrast: Omnipaque 350  95 cc administered   5 cc discarded  Oral Contrast: NONE.    PROCEDURE:  CT of the Abdomen and Pelvis was performed.  Sagittal and coronal reformats were performed.    FINDINGS:  LOWER CHEST: Mild bibasilar linear atelectasis versus scarring.    LIVER: Within normal limits.  BILE DUCTS: Normal caliber.  GALLBLADDER: Within normal limits.  SPLEEN: Within normal limits.  PANCREAS: Within normal limits.  ADRENALS: Nonspecific left adrenal thickening  KIDNEYS/URETERS: Bilateral percutaneous nephroureteral tubes with distal   tips coiled in the bladder. No hydronephrosis. Bilateral renal cysts the   largest of which measuring up to 6.5 cm in the right upper pole and 5.9 cm    BLADDER: Decompressed  REPRODUCTIVE ORGANS: Prostate within normal limits.    BOWEL: Status post partial left hemicolectomy with ostomy/stoma site in   the left periumbilical region. Appendix is normal.  PERITONEUM/RETROPERITONEUM: Within normal limits.  VESSELS: IVC filter. Mild atherosclerotic plaques.  LYMPH NODES: No lymphadenopathy.  ABDOMINAL WALL: Left periumbilical stoma site, as stated above.   Postsurgical changes. Lipomatous flap overlying the pelvic/gluteal cleft..  BONES: Mild degenerative changes.    IMPRESSION:  Bilateral percutaneous nephroureteral tubes with distal tips coiled in   the bladder. No hydronephrosis.        --- End of Report ---          GENIE CALDERÓN MD; Resident Radiologist  This document has been electronically signed.  ELI PATTERSON MD; Attending Radiologist  This document has been electronically signed. Al 10 2025  7:11AM     Patient is a 59 year old male with PMH of HTN, HLD, anxiety/depression, HIV on Biktarvy( CD4 624 in 12/2024 and rey load undetectable in 5/2025), h/o anal and prostate Ca (s/p chemotherapy + radiation in 2020 c/b bilateral nephrostomy tubes tubes and ostomy),  ureteral stricture s/p stenting and bow with b/l nephroureteral tubes (last exchanged 5/9), recent parastomal hernia repair with mesh in 4/2025  presenting for bilateral flank pain and hematuria. Patient states he started having pain around the stoma site and radiating to the b/l flanks 2 weeks ago and then 1 week ago he noted hematuria in b/l nephrostomy tubes with worsenign pain so he came to the hospital. No fevers, chills, nausea, vomiting, prior to hospitalization.     In the ER:   VSS  Labs: CBC without leukocytosis. CMP with creatinine 0.80 ( baseline), U/A x 2 with large LE, positive nitrites and >100 wbcs. Urine culture with >100,000 gram negative rods. Prior urine culturess with providencia rettgeri (4/2025), klebsiella pneumoniae ( 8/2024), pseudomonas in 7/2024, enterobacter cloacae ( 7/2024), pseudomonas in 4/2024 (resistant only to imipenem of carbapenems).   CT abdomen pelvis: Bilateral percutaneous nephroureteral tubes with distal tips coiled in the bladder. No hydronephrosis.    Urology consulted; no acute intervention.     ID called for rising leukocytosis today to 13 and tachycardia. Of note, IR was consulted yesterday to evaluate right nephroureteral tube for broken suture; the catheter was rescecured and flushed with no concern for malpositioning and a new drainage bag was connected.     Abx:   Zosyn ( 6/10-)   -s/p Ceftriaxone 6/10     REVIEW OF SYSTEMS  Constitutional: No fevers, No chills  Respiratory: No cough, no SOB  Cardiovascular:  No chest pain, No palpitations   Gastrointestinal: + pain, + nausea, No vomiting, No diarrhea, No constipation	  Genitourinary: No dysuria, No frequency, No hesitancy, + flank pain  MSK: No Joint pain, No back pain, No edema  Neurological: No HA, no weakness, no seizures, no AMS     prior hospital charts reviewed [V]  primary team notes reviewed [V]  other consultant notes reviewed [V]    PAST MEDICAL & SURGICAL HISTORY:  HTN (hypertension)      HIV infection      Anxiety      History of anal cancer      History of prostate cancer      HLD (hyperlipidemia)      Parastomal hernia with obstruction and without gangrene      S/P colostomy      Nephrostomy present          SOCIAL HISTORY:  +former smoker   -denies etoh use anddrug use   Denied smoking/vaping/alcohol/recreational drug use    FAMILY HISTORY:  FH: prostate cancer    FH: breast cancer        Allergies  No Known Allergies        ANTIMICROBIALS:  bictegravir 50 mG/emtricitabine 200 mG/tenofovir alafenamide 25 mG (BIKTARVY) 1 daily  piperacillin/tazobactam IVPB.. 3.375 every 8 hours      ANTIMICROBIALS (past 90 days):  MEDICATIONS  (STANDING):  bictegravir 50 mG/emtricitabine 200 mG/tenofovir alafenamide 25 mG (BIKTARVY)   1 Tablet(s) Oral (06-12-25 @ 11:44)   1 Tablet(s) Oral (06-11-25 @ 12:12)   1 Tablet(s) Oral (06-10-25 @ 18:39)    cefTRIAXone   IVPB   100 mL/Hr IV Intermittent (06-10-25 @ 12:15)    piperacillin/tazobactam IVPB.   200 mL/Hr IV Intermittent (06-10-25 @ 16:25)    piperacillin/tazobactam IVPB.-   25 mL/Hr IV Intermittent (06-10-25 @ 18:39)    piperacillin/tazobactam IVPB.-   25 mL/Hr IV Intermittent (06-11-25 @ 02:31)    piperacillin/tazobactam IVPB.-   25 mL/Hr IV Intermittent (06-11-25 @ 07:16)    piperacillin/tazobactam IVPB..   25 mL/Hr IV Intermittent (06-12-25 @ 14:46)   25 mL/Hr IV Intermittent (06-12-25 @ 06:16)   25 mL/Hr IV Intermittent (06-11-25 @ 23:05)   25 mL/Hr IV Intermittent (06-11-25 @ 15:20)        OTHER MEDS:   MEDICATIONS  (STANDING):  acetaminophen     Tablet .. 650 every 6 hours PRN  bisacodyl 5 every 12 hours  buPROPion XL (24-Hour) . 150 daily  clonazePAM  Tablet 1 two times a day PRN  diltiazem    daily  ezetimibe 10 at bedtime  melatonin 3 at bedtime PRN  metoprolol tartrate 25 two times a day  morphine  - Injectable 2 every 4 hours PRN  oxyCODONE    IR 5 every 4 hours PRN  polyethylene glycol 3350 17 two times a day PRN  senna 2 at bedtime PRN  traZODone 50 at bedtime  zolpidem 5 at bedtime PRN  zolpidem 5 at bedtime PRN      VITALS:  Vital Signs Last 24 Hrs  T(F): 97.9 (06-12-25 @ 12:30), Max: 99.9 (06-12-25 @ 05:45)    Vital Signs Last 24 Hrs  HR: 117 (06-12-25 @ 12:30) (117 - 136)  BP: 125/88 (06-12-25 @ 12:30) (109/79 - 135/94)  RR: 18 (06-12-25 @ 12:30)  SpO2: 100% (06-12-25 @ 12:30) (99% - 100%)  Wt(kg): --    EXAM:  General: Patient appears comfortable, no acute distress  HEENT: NCAT  CV: +S1/S2, tachycardic   Lungs: No respiratory distress, CTA b/l  Abd:  +tenderness to palpation around stoma and b/l CVA tenderness. BS4+, Soft,, no guarding. +nephroureteral tube sites c/d/i with bloody output bilaterally.   Neuro: AAOx3. No focal deficits noted.   Ext: No cyanosis, no edema  Msk: freely moving upper and lower extremities  Skin: No rash, no phlebitis, No erythema       Labs:                        12.2   13.30 )-----------( 351      ( 12 Jun 2025 07:05 )             35.7     06-12    137  |  101  |  18  ----------------------------<  137[H]  3.7   |  24  |  1.00    Ca    9.4      12 Jun 2025 07:05  Phos  3.4     06-12  Mg     2.10     06-12    TPro  7.1  /  Alb  3.8  /  TBili  0.4  /  DBili  x   /  AST  14  /  ALT  14  /  AlkPhos  91  06-12      WBC Trend:  WBC Count: 13.30 (06-12-25 @ 07:05)  WBC Count: 7.22 (06-11-25 @ 05:32)  WBC Count: 8.47 (06-10-25 @ 23:30)  WBC Count: 8.31 (06-10-25 @ 03:22)      Auto Neutrophil #: 5.87 K/uL (12-30-24 @ 10:15)  Auto Neutrophil #: 6.24 K/uL (09-01-24 @ 06:10)      Creatine Trend:  Creatinine: 1.00 (06-12)  Creatinine: 0.93 (06-11)  Creatinine: 0.80 (06-10)      Liver Biochemical Testing Trend:  Alanine Aminotransferase (ALT/SGPT): 14 (06-12)  Alanine Aminotransferase (ALT/SGPT): 11 (06-11)  Alanine Aminotransferase (ALT/SGPT): 9 (06-10)  Alanine Aminotransferase (ALT/SGPT): 19 (05-12)  Alanine Aminotransferase (ALT/SGPT): 6 (05-11)  Aspartate Aminotransferase (AST/SGOT): 14 (06-12-25 @ 07:05)  Aspartate Aminotransferase (AST/SGOT): 13 (06-11-25 @ 05:32)  Aspartate Aminotransferase (AST/SGOT): 10 (06-10-25 @ 09:49)  Aspartate Aminotransferase (AST/SGOT): 19 (05-12-25 @ 06:30)  Aspartate Aminotransferase (AST/SGOT): 8 (05-11-25 @ 05:40)  Bilirubin Total: 0.4 (06-12)  Bilirubin Total: 0.3 (06-11)  Bilirubin Total: 0.2 (06-10)  Bilirubin Direct: <0.2 (05-12)  Bilirubin Total: 0.2 (05-12)      Trend LDH          MICROBIOLOGY:        Urinalysis with Rflx Culture (collected 10 Al 2025 09:50)    Urinalysis with Rflx Culture (collected 10 Al 2025 08:00)    Culture - Urine (collected 10 Al 2025 08:00)  Source: Urine  Preliminary Report:    >100,000 CFU/ml Gram Negative Rods    Culture - Urine (collected 10 May 2025 11:41)  Source: Urine  Final Report:    No growth    Culture - Fungal, Other (collected 10 May 2025 11:41)  Source: Other  Final Report:    No fungus isolated at 2 weeks.    Culture - Urine (collected 10 May 2025 11:23)  Source: Urine  Final Report:    No growth    Culture - Fungal, Other (collected 10 May 2025 11:23)  Source: Other  Final Report:    No fungus isolated at 2 weeks.    Urinalysis with Rflx Culture (collected 10 May 2025 07:09)    Culture - Urine (collected 10 May 2025 05:30)  Source: Clean Catch  Final Report:    No growth    Culture - Blood (collected 10 May 2025 02:30)  Source: Blood Blood  Final Report:    No growth at 5 days        ABS CD4: 745 cells/uL (09-26-24 @ 19:52)  ABS CD4: 812 cells/uL (07-18-24 @ 11:25)  ABS CD4: 548 cells/uL (04-18-24 @ 16:17)  ABS CD4: 608 cells/uL (02-12-24 @ 09:46)  ABS CD4: 579 cells/uL (12-11-23 @ 21:15)    HIV-1 RNA Quantitative, Viral Load: NOT DET. copies/mL (05-10-25 @ 02:15)  HIV-1 Viral Load Result: NOT DET. (05-10-25 @ 02:15)  HIV-1 RNA Quantitative, Viral Load: NOT DET. copies/mL (09-26-24 @ 19:52)  HIV-1 Viral Load Result: NOT DET. (09-26-24 @ 19:52)  HIV-1 RNA Quantitative, Viral Load: 32 (07-18-24 @ 11:25)                                      Blood Gas Venous - Lactate: 1.8 (06-12 @ 11:55)    A1C with Estimated Average Glucose Result: 5.9 % (04-07-25 @ 11:45)      RADIOLOGY:    ACC: [id number] EXAM:  CT ABDOMEN AND PELVIS IC   ORDERED BY: RICH GARZA     PROCEDURE DATE:  06/10/2025          INTERPRETATION:  CLINICAL INFORMATION: Assess nephrostomy tube placement.    COMPARISON: X-ray abdomen 5/11/2025.    CONTRAST/COMPLICATIONS:  IV Contrast: Omnipaque 350  95 cc administered   5 cc discarded  Oral Contrast: NONE.    PROCEDURE:  CT of the Abdomen and Pelvis was performed.  Sagittal and coronal reformats were performed.    FINDINGS:  LOWER CHEST: Mild bibasilar linear atelectasis versus scarring.    LIVER: Within normal limits.  BILE DUCTS: Normal caliber.  GALLBLADDER: Within normal limits.  SPLEEN: Within normal limits.  PANCREAS: Within normal limits.  ADRENALS: Nonspecific left adrenal thickening  KIDNEYS/URETERS: Bilateral percutaneous nephroureteral tubes with distal   tips coiled in the bladder. No hydronephrosis. Bilateral renal cysts the   largest of which measuring up to 6.5 cm in the right upper pole and 5.9 cm    BLADDER: Decompressed  REPRODUCTIVE ORGANS: Prostate within normal limits.    BOWEL: Status post partial left hemicolectomy with ostomy/stoma site in   the left periumbilical region. Appendix is normal.  PERITONEUM/RETROPERITONEUM: Within normal limits.  VESSELS: IVC filter. Mild atherosclerotic plaques.  LYMPH NODES: No lymphadenopathy.  ABDOMINAL WALL: Left periumbilical stoma site, as stated above.   Postsurgical changes. Lipomatous flap overlying the pelvic/gluteal cleft..  BONES: Mild degenerative changes.    IMPRESSION:  Bilateral percutaneous nephroureteral tubes with distal tips coiled in   the bladder. No hydronephrosis.        --- End of Report ---          GENIE CALDERÓN MD; Resident Radiologist  This document has been electronically signed.  ELI PATTERSON MD; Attending Radiologist  This document has been electronically signed. Al 10 2025  7:11AM

## 2025-06-12 NOTE — PROGRESS NOTE ADULT - PROBLEM SELECTOR PLAN 3
On diltiazem 240mg and metoprolol [id number] at home. Follows with cardiology but has no documented tachyarrhythmia history. Not septic during this admission.    - Continue home diltiazem 240 mg  - Hold home metoprolol succinate for now ISO infection  - inpatient BP goals:  - 160 (given hematuria) On diltiazem 240mg and metoprolol [id number] at home. Follows with cardiology but has no documented tachyarrhythmia history. Not septic during this admission.    - Continue home diltiazem 240 mg  - On Toprol 100 QD at home; reintroduced Lopressor 25 BID  - inpatient BP goals:  - 160 (given hematuria)

## 2025-06-12 NOTE — PROGRESS NOTE ADULT - PROBLEM SELECTOR PLAN 6
S/p chemotherapy + radiation in 2020 c/b bilateral nephrostomy tubes and ostomy. In remission. Follows with Dr. Andres Funez (oncology)    - Follow up outpatient with oncology given no active concerns - Continue home Ezetimibe  - Consider outpatient Lipid panel and Lp(a) assessment with cardiology

## 2025-06-12 NOTE — CONSULT NOTE ADULT - ASSESSMENT
Patient is a 59 year old male with PMH of HTN, HLD, anxiety/depression, HIV on Biktarvy( CD4 624 in 12/2024 and rey load undetectable in 5/2025), h/o anal and prostate Ca (s/p chemotherapy + radiation in 2020 c/b bilateral nephrostomy tubes tubes and ostomy),  ureteral stricture s/p stenting and bow with b/l nephroureteral tubes (last exchanged 5/9), recent parastomal hernia repair with mesh in 4/2025  presenting for abdominal pain/flank pain for 2 weeks associated with marvin blood from b/l nephroureteral tubes for the past week. No fevers, chills, nausea, vomiting.     On presentation, VSS  Labs without leukocytosis.     Infectious workup/imaging:   -U/A x 2 with large LE, positive nitrites and >100 wbcs. Urine culture with >100,000 gram negative rods. Prior urine culturess with providencia rettgeri (4/2025), klebsiella pneumoniae ( 8/2024), pseudomonas in 7/2024, enterobacter cloacae ( 7/2024), pseudomonas in 4/2024 (resistant only to imipenem of carbapenems).   -CT abdomen pelvis: Bilateral percutaneous nephroureteral tubes with distal tips coiled in the bladder. No hydronephrosis.    ID called for rising leukocytosis today to 13 and tachycardia today. On exam, patient with marvin blood from b/l nephroureteral tubes and tenderness to palpation in b/l flanks and surrounding stoma sites.       #b/l flank pain   #b/l nephroureteral tubes with marvin blood  #leukocytosis   #tachycardia   #HIV   #hx of syphilis   -unclear if presentation is infectious. Concern that tachycardia and leukocytosis may be secondary to bleeding. 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 obtain RPR with AM labs and will consider further treatment.   -f/u all culture data  -monitor WBC and fever curve     Case seen and discussed with Dr. Chan who agrees with assessment and plan. Note not final until attending addendum.

## 2025-06-12 NOTE — PROGRESS NOTE ADULT - ATTENDING COMMENTS
EKG  plan to restart BB with hold parameters Patient seen and examined with team.  Agree with above a/p by Dr Danielle  59M w/ hx of prostate + anal cancer (s/p chemotherapy + radiation in 2020 c/b bilateral nephrostomy tubes and ostomy), HIV on Biktarvy, HTN, HLD, anxiety/depression presenting for bilateral flank pain and hematuria found to have positive UA and normal functioning nephrostomy tubes per urology suspicious for pyelonephritis. Treating with Zosyn for now given history of pseudomonas.    PE nad patient notes pain 9/10 tody to R Lower back area    Vital Signs Last 24 Hrs  T(F): 99.9 (12 Jun 2025 05:45), Max: 99.9 (12 Jun 2025 05:45)  HR: 128 BP: 109/79 , RR: 18 , SpO2: 100% room air  Lungs cta, cor Tachycardia , abd soft n/t POSITIVE B/L neph tubes with blood and Positive colostomy bag    6/12/25 EKG --> Sinus tachy at 135 bpm                        12.2   13.30 )-----------( 351      ( 12 Jun 2025 07:05 )             35.7     < from: CT Abdomen and Pelvis w/ IV Cont (06.10.25 @ 06:27) >  IMPRESSION:  Bilateral percutaneous nephroureteral tubes with distal tips coiled in   the bladder. No hydronephrosis.    A/P  #ID  Sepsis -sec to UTI vs Pyelo ->Tachycardia with increased wbc  13.3 IVF, monitor lactate. c/w iv Zosyn.  ID consult to asses antibiotics. Urine cultures alredy --> Pos  GNR  #Tachycardia hr 135,    Sepsis vs pain, IVF hydration, iv Dilaudid for 9/10 pain.  # B/l Neph tubes. Urology greatly appreciated. No intervention planned. Has Hematuria.   t/s, keep hgb> 7  # HTN c/w Cardizem, restart BB hold sbp <100  #Pain. c/w oxy ir prn -->  Oxy 10 mg q6 prn mod/severe. morphine 2 mg iv q6 prn breakthrough.   c/w  MiraLax/ senna. and Iv dilauid o.5 q 4 prn   Stat Iv Dilaudid now  # Dvt prop-- Seq teds.  plan d/w patient/ team/ rn Patient seen and examined with team.  Agree with above a/p by Dr Danielle  59M w/ hx of prostate + anal cancer (s/p chemotherapy + radiation in 2020 c/b bilateral nephrostomy tubes and ostomy), HIV on Biktarvy, HTN, HLD, anxiety/depression presenting for bilateral flank pain and hematuria found to have positive UA and normal functioning nephrostomy tubes per urology suspicious for pyelonephritis. Treating with Zosyn for now given history of pseudomonas.    PE nad patient notes pain 9/10 tody to R Lower back area    Vital Signs Last 24 Hrs  T(F): 99.9 (12 Jun 2025 05:45), Max: 99.9 (12 Jun 2025 05:45)  HR: 128 BP: 109/79 , RR: 18 , SpO2: 100% room air  Lungs cta, cor Tachycardia , abd soft n/t POSITIVE B/L neph tubes with blood and Positive colostomy bag    6/12/25 EKG --> Sinus tachy at 135 bpm                        12.2   13.30 )-----------( 351      ( 12 Jun 2025 07:05 )             35.7     < from: CT Abdomen and Pelvis w/ IV Cont (06.10.25 @ 06:27) >  IMPRESSION:  Bilateral percutaneous nephroureteral tubes with distal tips coiled in   the bladder. No hydronephrosis.    A/P  #ID  Sepsis -sec to UTI vs Pyelo ->Tachycardia with increased wbc  13.3 IVF, monitor lactate. c/w iv Zosyn.  ID consult to asses antibiotics. Urine cultures alredy --> Pos  GNR  #Tachycardia hr 135,    Sepsis vs pain, IVF hydration, iv Dilaudid for 9/10 pain.  # B/l Neph tubes. Urology greatly appreciated. No intervention planned. Has Hematuria.   t/s, keep hgb> 7  # HTN c/w Cardizem, restart BB hold sbp <100  #Pain. c/w oxy ir prn -->  Oxy 10 mg q6 prn mod/severe. morphine 2 mg iv q6 prn breakthrough.--> change to iv dilaudid    c/w  MiraLax/ senna. and Iv Dilaudid o.5 q 4 prn severe pain   Stat Iv Dilaudid now  # Dvt prop-- Seq teds.  plan d/w patient/ team/ rn    f/u vitals, f/u id, f/u pain/ f/u lactate Patient seen and examined with team.  Agree with above a/p by Dr Danielle  59M w/ hx of prostate + anal cancer (s/p chemotherapy + radiation in 2020 c/b bilateral nephrostomy tubes and ostomy), HIV on Biktarvy, HTN, HLD, anxiety/depression presenting for bilateral flank pain and hematuria found to have positive UA and normal functioning nephrostomy tubes per urology suspicious for pyelonephritis. Treating with Zosyn for now given history of pseudomonas.    PE nad patient notes pain 9/10 tody to R Lower back area    Vital Signs Last 24 Hrs  T(F): 99.9 (12 Jun 2025 05:45), Max: 99.9 (12 Jun 2025 05:45)  HR: 128 BP: 109/79 , RR: 18 , SpO2: 100% room air  Lungs cta, cor Tachycardia , abd soft n/t POSITIVE B/L neph tubes with blood and Positive colostomy bag    6/12/25 EKG --> Sinus tachy at 135 bpm                        12.2   13.30 )-----------( 351      ( 12 Jun 2025 07:05 )             35.7     < from: CT Abdomen and Pelvis w/ IV Cont (06.10.25 @ 06:27) >  IMPRESSION:  Bilateral percutaneous nephroureteral tubes with distal tips coiled in   the bladder. No hydronephrosis.    A/P  #ID  Sepsis -sec to UTI vs Pyelo ->Tachycardia with increased wbc  13.3 IVF, monitor lactate. c/w iv Zosyn.  ID consult to asses antibiotics. Urine cultures alredy --> Pos  GNR  #Tachycardia hr 135,    Sepsis vs pain, IVF hydration, iv Dilaudid for 9/10 pain.  # B/l Neph tubes. Urology greatly appreciated. No intervention planned. Has Hematuria.   t/s, keep hgb> 7  # HTN c/w Cardizem, restart BB hold sbp <100  #Pain. c/w oxy ir prn -->  Oxy 10 mg q6 prn mod/severe. morphine 2 mg iv q6 prn breakthrough.  c/w  MiraLax/ senna.   RN just gave iv morphine. f/u pain  # Dvt prop-- Seq teds.  plan d/w patient/ team/ rn    f/u vitals, f/u id, f/u pain/ f/u lactate  f/u pain, may need iv morphine 2 mg q4 hrs prn

## 2025-06-12 NOTE — PROGRESS NOTE ADULT - PROBLEM SELECTOR PLAN 1
UA positive with bilateral nephrostomy site pain consistent with complex UTI without clinical or radiographic signs of pyelonephritis. Has a history of both sensitive and carbapenem-resistant pseudomonas.    - Start Zosyn (6/10PM - ) plan for 10days as of now  - S/p ceftriaxone 1g 6/10  - Monitor infection signs  - F/u UCx  from both nephrostomy tubes UA positive with bilateral nephrostomy site pain consistent with complex UTI without clinical or radiographic signs of pyelonephritis. Has a history of both sensitive and carbapenem-resistant pseudomonas.    - Zosyn (6/10PM -6/12), Cefepime 2g Q12 (6/12-  - UCx w/ >100k GNR; drawn from Nephrostomy tube   - Worsening tachycardia and pain; white count elevating  - ID consulted, appreciate recs   - F/u UCx  from both nephrostomy tubes

## 2025-06-12 NOTE — PROGRESS NOTE ADULT - SUBJECTIVE AND OBJECTIVE BOX
Progress Note  Authored by:   Gary Danielle MD   PGY-2 Internal Medicine    Patient is a 59y old  Male who presents with a chief complaint of UTI w/ bilateral nephrostomy tubes (11 Jun 2025 08:15)      Subjective / Overnight Events :  - Tachycardia to 130s overnight while 6/10 pain; overall HR persistently elevated. No tele or EKG available for review.   - Pt seen and examined at bedside.     Additional ROS (if any):        acetaminophen     Tablet .. 650 milliGRAM(s) Oral every 6 hours PRN  bictegravir 50 mG/emtricitabine 200 mG/tenofovir alafenamide 25 mG (BIKTARVY) 1 Tablet(s) Oral daily  buPROPion XL (24-Hour) . 150 milliGRAM(s) Oral daily  cholecalciferol 2000 Unit(s) Oral daily  clonazePAM  Tablet 1 milliGRAM(s) Oral two times a day PRN  diltiazem    milliGRAM(s) Oral daily  ezetimibe 10 milliGRAM(s) Oral at bedtime  melatonin 3 milliGRAM(s) Oral at bedtime PRN  morphine  - Injectable 2 milliGRAM(s) IV Push every 6 hours PRN  multivitamin 1 Tablet(s) Oral daily  oxyCODONE    IR 5 milliGRAM(s) Oral every 4 hours PRN  oxyCODONE    IR 7.5 milliGRAM(s) Oral every 4 hours PRN  piperacillin/tazobactam IVPB.. 3.375 Gram(s) IV Intermittent every 8 hours  polyethylene glycol 3350 17 Gram(s) Oral daily PRN  senna 2 Tablet(s) Oral at bedtime PRN  traZODone 50 milliGRAM(s) Oral at bedtime  zolpidem 5 milliGRAM(s) Oral at bedtime PRN  zolpidem 5 milliGRAM(s) Oral at bedtime PRN          Physical Exam     T(C): 37.7 (06-12-25 @ 05:45), Max: 37.7 (06-12-25 @ 05:45)  HR: 128 (06-12-25 @ 05:45) (105 - 136)  BP: 109/79 (06-12-25 @ 05:45) (109/79 - 135/94)  RR: 18 (06-12-25 @ 05:45) (18 - 18)  SpO2: 100% (06-12-25 @ 05:45) (96% - 100%)      06-11-25 @ 07:01  -  06-12-25 @ 07:00  --------------------------------------------------------  IN: 0 mL / OUT: 1680 mL / NET: -1680 mL        Physical Exam:  GENERAL: NAD, lying in bed comfortably  HEAD:  Atraumatic, Normocephalic  EYES: EOMI, conjunctiva and sclera clear  ENT: Moist mucous membranes  CHEST/LUNG: Clear to auscultation bilaterally; No rales, rhonchi, wheezing, or rubs. Unlabored respirations  HEART: Regular rate and rhythm; No murmurs, rubs, or gallops  ABDOMEN: Soft, ostomy present near umbilicus, tender to palpation near stoma, nondistended, L nephroueterostomy bag with decreased yellow output, R bag with bloody output and clots in the bag  EXTREMITIES:  No clubbing, cyanosis, or edema  NERVOUS SYSTEM:  A&Ox3, no focal deficits   SKIN: No rashes or lesions      Labs:                        12.5   7.22  )-----------( 323      ( 11 Jun 2025 05:32 )             37.7     06-10    139  |  106  |  18  ----------------------------<  96  4.0   |  23  |  0.80    Ca    9.0      10 Al 2025 09:49  Phos  2.6     06-10  Mg     2.00     06-10    TPro  7.0  /  Alb  3.6  /  TBili  0.2  /  DBili  x   /  AST  10  /  ALT  9   /  AlkPhos  92  06-10    PT/INR - ( 11 Jun 2025 05:32 )   PT: 12.4 sec;   INR: 1.07 ratio         PTT - ( 11 Jun 2025 05:32 )  PTT:30.8 sec    Urinalysis with Rflx Culture (collected 06-10-25 @ 09:50)        Radiology/Procedures: Reviewed.

## 2025-06-12 NOTE — PROGRESS NOTE ADULT - PROBLEM SELECTOR PLAN 2
Dark colored in bilateral nephrostomy tubes. 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 Increased hematuria on 6/11 in R bag with clots.    - IR consulted for displaced site of R nephrostomy but is flushing so just monitor for now per IR team  - Urology following, states bloody output is okay  - Maintain active T&S  - trend hgb, transfuse PRN hgb <7  - BP regulation (SBP < 140) Dark colored in bilateral nephrostomy tubes. 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 Increased hematuria on 6/11 in R bag with clots.    - IR consulted for displaced site of R nephrostomy but is flushing so just monitor for now per IR team  - Urology following, states bloody output is okay iso radiation -> friable  tract  - Trend CBC BID; drops in H&H -> urgent CTA A&P arterial/venous phase eval for bleed and call IR   - Maintain active T&S  - trend hgb, transfuse PRN hgb <7  - BP regulation (SBP < 140)

## 2025-06-12 NOTE — PROGRESS NOTE ADULT - PROBLEM SELECTOR PLAN 8
DVT ppx: SCD given hematuria  Diet: Regular  Dispo: Pending PT and Hospital course  Code Status: Full Code S/p chemotherapy + radiation in 2020 c/b bilateral nephrostomy tubes and ostomy. In remission. Follows with Dr. Andres Funez (oncology)    - Follow up outpatient with oncology given no active concerns

## 2025-06-12 NOTE — PROGRESS NOTE ADULT - PROBLEM SELECTOR PLAN 9
DVT ppx: SCD given hematuria  Diet: Regular  Dispo: Pending Hospital course; no PT needs  Code Status: Full Code

## 2025-06-12 NOTE — PROGRESS NOTE ADULT - PROBLEM SELECTOR PLAN 4
- Continue home Buproprion  - Continue home PRN clonazepam  - Continue home Ambien as PRN Sinus tach to 130s on 6/12 ekg w/ sinus tach  Likely iso pain vs. bleed vs. worsening sepsis vs. reflex from holding Metoprolol  Address pain, infection, and monitor H&H while active hematuria.   Restarted Lopressor at 25mg BID; hold off on further increases to minimize AV candace blockade

## 2025-06-13 LAB
ALBUMIN SERPL ELPH-MCNC: 3.7 G/DL — SIGNIFICANT CHANGE UP (ref 3.3–5)
ALP SERPL-CCNC: 81 U/L — SIGNIFICANT CHANGE UP (ref 40–120)
ALT FLD-CCNC: 16 U/L — SIGNIFICANT CHANGE UP (ref 4–41)
ANION GAP SERPL CALC-SCNC: 14 MMOL/L — SIGNIFICANT CHANGE UP (ref 7–14)
AST SERPL-CCNC: 14 U/L — SIGNIFICANT CHANGE UP (ref 4–40)
BASOPHILS # BLD AUTO: 0.03 K/UL — SIGNIFICANT CHANGE UP (ref 0–0.2)
BASOPHILS NFR BLD AUTO: 0.3 % — SIGNIFICANT CHANGE UP (ref 0–2)
BILIRUB SERPL-MCNC: 0.2 MG/DL — SIGNIFICANT CHANGE UP (ref 0.2–1.2)
BLD GP AB SCN SERPL QL: NEGATIVE — SIGNIFICANT CHANGE UP
BUN SERPL-MCNC: 23 MG/DL — SIGNIFICANT CHANGE UP (ref 7–23)
CALCIUM SERPL-MCNC: 9.5 MG/DL — SIGNIFICANT CHANGE UP (ref 8.4–10.5)
CHLORIDE SERPL-SCNC: 103 MMOL/L — SIGNIFICANT CHANGE UP (ref 98–107)
CO2 SERPL-SCNC: 22 MMOL/L — SIGNIFICANT CHANGE UP (ref 22–31)
CREAT SERPL-MCNC: 1.11 MG/DL — SIGNIFICANT CHANGE UP (ref 0.5–1.3)
EGFR: 77 ML/MIN/1.73M2 — SIGNIFICANT CHANGE UP
EGFR: 77 ML/MIN/1.73M2 — SIGNIFICANT CHANGE UP
EOSINOPHIL # BLD AUTO: 0 K/UL — SIGNIFICANT CHANGE UP (ref 0–0.5)
EOSINOPHIL NFR BLD AUTO: 0 % — SIGNIFICANT CHANGE UP (ref 0–6)
GLUCOSE SERPL-MCNC: 139 MG/DL — HIGH (ref 70–99)
HCT VFR BLD CALC: 30.1 % — LOW (ref 39–50)
HCT VFR BLD CALC: 30.2 % — LOW (ref 39–50)
HGB BLD-MCNC: 10.2 G/DL — LOW (ref 13–17)
HGB BLD-MCNC: 10.2 G/DL — LOW (ref 13–17)
IANC: 8.19 K/UL — HIGH (ref 1.8–7.4)
IMM GRANULOCYTES NFR BLD AUTO: 0.8 % — SIGNIFICANT CHANGE UP (ref 0–0.9)
LYMPHOCYTES # BLD AUTO: 17.2 % — SIGNIFICANT CHANGE UP (ref 13–44)
LYMPHOCYTES # BLD AUTO: 2.05 K/UL — SIGNIFICANT CHANGE UP (ref 1–3.3)
MAGNESIUM SERPL-MCNC: 2.2 MG/DL — SIGNIFICANT CHANGE UP (ref 1.6–2.6)
MCHC RBC-ENTMCNC: 29.4 PG — SIGNIFICANT CHANGE UP (ref 27–34)
MCHC RBC-ENTMCNC: 30.1 PG — SIGNIFICANT CHANGE UP (ref 27–34)
MCHC RBC-ENTMCNC: 33.8 G/DL — SIGNIFICANT CHANGE UP (ref 32–36)
MCHC RBC-ENTMCNC: 33.9 G/DL — SIGNIFICANT CHANGE UP (ref 32–36)
MCV RBC AUTO: 87 FL — SIGNIFICANT CHANGE UP (ref 80–100)
MCV RBC AUTO: 88.8 FL — SIGNIFICANT CHANGE UP (ref 80–100)
MONOCYTES # BLD AUTO: 1.56 K/UL — HIGH (ref 0–0.9)
MONOCYTES NFR BLD AUTO: 13.1 % — SIGNIFICANT CHANGE UP (ref 2–14)
NEUTROPHILS # BLD AUTO: 8.19 K/UL — HIGH (ref 1.8–7.4)
NEUTROPHILS NFR BLD AUTO: 68.6 % — SIGNIFICANT CHANGE UP (ref 43–77)
NRBC # BLD AUTO: 0.02 K/UL — HIGH (ref 0–0)
NRBC # BLD AUTO: 0.02 K/UL — HIGH (ref 0–0)
NRBC # FLD: 0.02 K/UL — HIGH (ref 0–0)
NRBC # FLD: 0.02 K/UL — HIGH (ref 0–0)
NRBC BLD AUTO-RTO: 0 /100 WBCS — SIGNIFICANT CHANGE UP (ref 0–0)
NRBC BLD AUTO-RTO: 0 /100 WBCS — SIGNIFICANT CHANGE UP (ref 0–0)
PHOSPHATE SERPL-MCNC: 2.9 MG/DL — SIGNIFICANT CHANGE UP (ref 2.5–4.5)
PLATELET # BLD AUTO: 344 K/UL — SIGNIFICANT CHANGE UP (ref 150–400)
PLATELET # BLD AUTO: 355 K/UL — SIGNIFICANT CHANGE UP (ref 150–400)
POTASSIUM SERPL-MCNC: 4.2 MMOL/L — SIGNIFICANT CHANGE UP (ref 3.5–5.3)
POTASSIUM SERPL-SCNC: 4.2 MMOL/L — SIGNIFICANT CHANGE UP (ref 3.5–5.3)
PROT SERPL-MCNC: 6.8 G/DL — SIGNIFICANT CHANGE UP (ref 6–8.3)
RBC # BLD: 3.39 M/UL — LOW (ref 4.2–5.8)
RBC # BLD: 3.47 M/UL — LOW (ref 4.2–5.8)
RBC # FLD: 14.2 % — SIGNIFICANT CHANGE UP (ref 10.3–14.5)
RBC # FLD: 14.3 % — SIGNIFICANT CHANGE UP (ref 10.3–14.5)
RH IG SCN BLD-IMP: NEGATIVE — SIGNIFICANT CHANGE UP
SODIUM SERPL-SCNC: 139 MMOL/L — SIGNIFICANT CHANGE UP (ref 135–145)
WBC # BLD: 11.93 K/UL — HIGH (ref 3.8–10.5)
WBC # BLD: 12.47 K/UL — HIGH (ref 3.8–10.5)
WBC # FLD AUTO: 11.93 K/UL — HIGH (ref 3.8–10.5)
WBC # FLD AUTO: 12.47 K/UL — HIGH (ref 3.8–10.5)

## 2025-06-13 PROCEDURE — 99232 SBSQ HOSP IP/OBS MODERATE 35: CPT

## 2025-06-13 PROCEDURE — G0545: CPT

## 2025-06-13 PROCEDURE — 76770 US EXAM ABDO BACK WALL COMP: CPT | Mod: 26

## 2025-06-13 PROCEDURE — 99233 SBSQ HOSP IP/OBS HIGH 50: CPT | Mod: GC

## 2025-06-13 RX ORDER — HYDROMORPHONE/SOD CHLOR,ISO/PF 2 MG/10 ML
0.5 SYRINGE (ML) INJECTION EVERY 4 HOURS
Refills: 0 | Status: DISCONTINUED | OUTPATIENT
Start: 2025-06-13 | End: 2025-06-17

## 2025-06-13 RX ORDER — OXYBUTYNIN CHLORIDE 5 MG/1
5 TABLET, FILM COATED, EXTENDED RELEASE ORAL THREE TIMES A DAY
Refills: 0 | Status: DISCONTINUED | OUTPATIENT
Start: 2025-06-13 | End: 2025-06-16

## 2025-06-13 RX ORDER — METOPROLOL SUCCINATE 50 MG/1
25 TABLET, EXTENDED RELEASE ORAL ONCE
Refills: 0 | Status: COMPLETED | OUTPATIENT
Start: 2025-06-13 | End: 2025-06-13

## 2025-06-13 RX ORDER — METOPROLOL SUCCINATE 50 MG/1
50 TABLET, EXTENDED RELEASE ORAL EVERY 12 HOURS
Refills: 0 | Status: DISCONTINUED | OUTPATIENT
Start: 2025-06-13 | End: 2025-06-13

## 2025-06-13 RX ORDER — METOPROLOL SUCCINATE 50 MG/1
50 TABLET, EXTENDED RELEASE ORAL EVERY 12 HOURS
Refills: 0 | Status: DISCONTINUED | OUTPATIENT
Start: 2025-06-13 | End: 2025-06-15

## 2025-06-13 RX ORDER — PHENAZOPYRIDINE HCL 100 MG
100 TABLET ORAL EVERY 8 HOURS
Refills: 0 | Status: COMPLETED | OUTPATIENT
Start: 2025-06-13 | End: 2025-06-14

## 2025-06-13 RX ADMIN — BUPROPION HYDROBROMIDE 150 MILLIGRAM(S): 522 TABLET, EXTENDED RELEASE ORAL at 12:48

## 2025-06-13 RX ADMIN — Medication 0.5 MILLIGRAM(S): at 21:34

## 2025-06-13 RX ADMIN — Medication 5 MILLIGRAM(S): at 17:21

## 2025-06-13 RX ADMIN — DILTIAZEM HYDROCHLORIDE 240 MILLIGRAM(S): 240 TABLET, EXTENDED RELEASE ORAL at 05:28

## 2025-06-13 RX ADMIN — Medication 0.5 MILLIGRAM(S): at 09:51

## 2025-06-13 RX ADMIN — Medication 100 MILLIGRAM(S): at 17:20

## 2025-06-13 RX ADMIN — METOPROLOL SUCCINATE 50 MILLIGRAM(S): 50 TABLET, EXTENDED RELEASE ORAL at 17:20

## 2025-06-13 RX ADMIN — Medication 0.5 MILLIGRAM(S): at 14:34

## 2025-06-13 RX ADMIN — BICTEGRAVIR SODIUM, EMTRICITABINE, AND TENOFOVIR ALAFENAMIDE FUMARATE 1 TABLET(S): 50; 200; 25 TABLET ORAL at 12:48

## 2025-06-13 RX ADMIN — CEFEPIME 100 MILLIGRAM(S): 2 INJECTION, POWDER, FOR SOLUTION INTRAVENOUS at 05:30

## 2025-06-13 RX ADMIN — EZETIMIBE 10 MILLIGRAM(S): 10 TABLET ORAL at 21:28

## 2025-06-13 RX ADMIN — Medication 2 MILLIGRAM(S): at 00:00

## 2025-06-13 RX ADMIN — Medication 2 MILLIGRAM(S): at 08:50

## 2025-06-13 RX ADMIN — METOPROLOL SUCCINATE 25 MILLIGRAM(S): 50 TABLET, EXTENDED RELEASE ORAL at 09:51

## 2025-06-13 RX ADMIN — CEFEPIME 100 MILLIGRAM(S): 2 INJECTION, POWDER, FOR SOLUTION INTRAVENOUS at 17:17

## 2025-06-13 RX ADMIN — Medication 0.5 MILLIGRAM(S): at 10:19

## 2025-06-13 RX ADMIN — Medication 2000 UNIT(S): at 12:48

## 2025-06-13 RX ADMIN — Medication 5 MILLIGRAM(S): at 05:28

## 2025-06-13 RX ADMIN — Medication 0.5 MILLIGRAM(S): at 22:34

## 2025-06-13 RX ADMIN — METOPROLOL SUCCINATE 25 MILLIGRAM(S): 50 TABLET, EXTENDED RELEASE ORAL at 05:28

## 2025-06-13 RX ADMIN — Medication 50 MILLIGRAM(S): at 21:28

## 2025-06-13 RX ADMIN — Medication 0.5 MILLIGRAM(S): at 15:00

## 2025-06-13 RX ADMIN — Medication 100 MILLILITER(S): at 12:47

## 2025-06-13 RX ADMIN — Medication 100 MILLIGRAM(S): at 21:28

## 2025-06-13 RX ADMIN — Medication 2 MILLIGRAM(S): at 05:20

## 2025-06-13 RX ADMIN — Medication 2 MILLIGRAM(S): at 04:50

## 2025-06-13 RX ADMIN — Medication 1 TABLET(S): at 12:48

## 2025-06-13 RX ADMIN — Medication 100 MILLIGRAM(S): at 09:52

## 2025-06-13 RX ADMIN — Medication 5 MILLIGRAM(S): at 21:27

## 2025-06-13 NOTE — PROGRESS NOTE ADULT - PROBLEM SELECTOR PLAN 2
Dark colored in bilateral nephrostomy tubes. 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 Increased hematuria on 6/11 in R bag with clots.    - IR consulted for displaced site of R nephrostomy but is flushing so just monitor for now per IR team  - Urology following, states bloody output is okay iso radiation -> friable  tract  - Trend CBC BID; drops in H&H -> urgent CTA A&P arterial/venous phase eval for bleed and call IR   - Maintain active T&S  - trend hgb, transfuse PRN hgb <7  - BP regulation (SBP < 140) Dark colored in bilateral nephrostomy tubes. 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 Increased hematuria on 6/11 in R bag with clots. Per Urology nikia due to friable  tissue.    - IR consulted for displaced site of R nephrostomy but is flushing so just monitor for now per IR team  - Trend CBC BID;   - if H&H drops -> urgent CTA A&P arterial/venous phase eval for bleed and call IR   - Maintain active T&S  - transfuse PRN hgb <7 or for large Hgb drop  - BP regulation (SBP < 140)

## 2025-06-13 NOTE — PROGRESS NOTE ADULT - ATTENDING COMMENTS
Agree with above   Bladder filled with clot  Patient with hx of prostate and anal cancer s/p RT  Refluxing bloody urine from bladder into kidneys  Rec conversion to NT

## 2025-06-13 NOTE — PROGRESS NOTE ADULT - PROBLEM SELECTOR PLAN 3
On diltiazem 240mg and metoprolol [id number] at home. Follows with cardiology but has no documented tachyarrhythmia history. Not septic during this admission.    - Continue home diltiazem 240 mg  - On Toprol 100 QD at home; reintroduced Lopressor 25 BID  - inpatient BP goals:  - 160 (given hematuria) On diltiazem 240mg and metoprolol [id number] at home. Follows with cardiology but has no documented tachyarrhythmia history. Not septic during this admission.    - Continue home diltiazem 240 mg  - Continue home BB as Lopressor 50mg BID  - inpatient BP goals: SBP <140 (given hematuria)

## 2025-06-13 NOTE — PROGRESS NOTE ADULT - PROBLEM SELECTOR PLAN 4
Sinus tach to 130s on 6/12 ekg w/ sinus tach  Likely iso pain vs. bleed vs. worsening sepsis vs. reflex from holding Metoprolol  Address pain, infection, and monitor H&H while active hematuria.   Restarted Lopressor at 25mg BID; hold off on further increases to minimize AV candace blockade Sinus tach to 130s on 6/12 ekg w/ sinus tach. Likely iso pain vs. bleed vs. worsening sepsis +/- reflex from holding Metoprolol.     - Assess if patient may be withdrawing from home Klonopin  - Pain management and infection control   - monitor H&H while active hematuria.   - Restarted BB as Lopressor 50mg BID

## 2025-06-13 NOTE — PROGRESS NOTE ADULT - ATTENDING COMMENTS
hr 140  nsr  increase lopressor 50 mg q12 hold sbp <100  now on cefepime  f/u urine culture  f/u pain Patient seen and examined with team.  Agree with above a/p by Dr Wong  59M w/ hx of prostate + anal cancer (s/p chemotherapy + radiation in 2020 c/b bilateral nephrostomy tubes and ostomy), HIV on Biktarvy, HTN, HLD, anxiety/depression presenting for bilateral flank pain and hematuria found to have positive UA and normal functioning nephrostomy tubes per urology suspicious for pyelonephritis. Treating with Zosyn for now given history of pseudomonas.    PE nad patient notes pain 9/10 tody to R Lower back area  Vital Signs Last 24 Hrs  T(C): 37.2 HR: 120 , BP: 120/94 , RR: 17 , SpO2: 100%  room air  Lungs cta, cor Tachycardia , abd soft n/t POSITIVE B/L neph tubes with blood and Positive colostomy bag    6/12/25 EKG --> Sinus tachy at 135 bpm                        12.2   13.30 )-----------( 351      ( 12 Jun 2025 07:05 )----->                       10.2   11.93 )-----------( 344      ( 13 Jun 2025 06:38 )             30.2                35.7       A/P  #ID  Sepsis -sec to UTI vs Pyelo ->Tachycardia with increased wbc  13.3 IVF, monitor lactate.   6/10 Urine cultures  --> Pos  GNR  ID consult appreciated.  Patient now on Cefepime IV.  F/u RPR for h/o secondary syphylis  #Tachycardia hr 135. Multifactorial. Drop in H/h, Pain and sepsis--. better with dec WBC.  IVF hydration/ Increase pain meds.   # B/l Neph tubes. Urology greatly appreciated. No intervention planned. Has Hematuria.   t/s, keep hgb> 7. Cbc q12, F/u Urology . may need CBI  # HTN c/w Cardizem, increase lopressor 50 mgq12 hold sbp <110  #Pain. start iv dilaudid 0.5 mg q6 prn severe pain.  c/w  MiraLax/ senna.   # Dvt prop-- Seq teds.    f/u Id/ Urology/ urine culture. cbc q12 and transfuse if Hgb drop <8  plan d/w patient and team

## 2025-06-13 NOTE — PROGRESS NOTE ADULT - SUBJECTIVE AND OBJECTIVE BOX
CC: F/U for UTI    Saw/spoke to patient. No fevers, no chills. No new complaints.    Allergies  No Known Allergies    ANTIMICROBIALS:  bictegravir 50 mG/emtricitabine 200 mG/tenofovir alafenamide 25 mG (BIKTARVY) 1 daily  cefepime   IVPB    cefepime   IVPB 2000 every 12 hours    PE:    Vital Signs Last 24 Hrs  T(C): 37.2 (2025 04:50), Max: 37.2 (2025 04:50)  T(F): 99 (2025 04:50), Max: 99 (2025 04:50)  HR: 120 (2025 09:50) (115 - 140)  BP: 120/94 (2025 09:50) (120/94 - 131/94)  RR: 17 (2025 04:50) (17 - 18)  SpO2: 100% (2025 04:50) (99% - 100%)    Gen: AOx3, NAD, non-toxic  CV: Nontachycardic  Resp: Breathing comfortably, RA  Abd: Soft, nontender  IV/Skin: No thrombophlebitis    LABS:                        10.2   11.93 )-----------( 344      ( 2025 06:38 )             30.2     06-13    139  |  103  |  23  ----------------------------<  139[H]  4.2   |  22  |  1.11    Ca    9.5      2025 06:38  Phos  2.9     06-13  Mg     2.20     06-13    TPro  6.8  /  Alb  3.7  /  TBili  0.2  /  DBili  x   /  AST  14  /  ALT  16  /  AlkPhos  81  06-13    Urinalysis Basic - ( 2025 06:38 )    Color: x / Appearance: x / SG: x / pH: x  Gluc: 139 mg/dL / Ketone: x  / Bili: x / Urobili: x   Blood: x / Protein: x / Nitrite: x   Leuk Esterase: x / RBC: x / WBC x   Sq Epi: x / Non Sq Epi: x / Bacteria: x    MICROBIOLOGY:    Urine  06-10-25   >100,000 CFU/ml Gram Negative Rods  --  --      HIV-1 RNA Quantitative, Viral Load Log: NOT DET. lg /mL (05-10-25 @ 02:15)  HIV-1 RNA Quantitative, Viral Load Log: NOT DET. lg /mL (24 @ 19:52)  HIV-1 RNA Quantitative, Viral Load Lo.5 (24 @ 11:25)    RADIOLOGY:    6/10 CT    IMPRESSION:  Bilateral percutaneous nephroureteral tubes with distal tips coiled in   the bladder. No hydronephrosis.

## 2025-06-13 NOTE — PROVIDER CONTACT NOTE (OTHER) - ACTION/TREATMENT ORDERED:
Notify MD, pain medication administered
Notify MD, stat 2mg morphine ordered
Notify MD, pain medication administered

## 2025-06-13 NOTE — PROGRESS NOTE ADULT - ASSESSMENT
60 yo M w/ PMHx of HIV on Biktarvy, HTN, prostate cancer and anal cancer s/p chemotherapy/radiation c/b b/l nephrostomy tubes (exchanged on 5/9 by IR) and ostomy presents for a few days of bilateral lower back/flank pain and bloody urine/passage of clots since 6 PM 6/9/25. Urology consulted for hematuria and bilateral flank pain. Patient is AVSS, WBC 8.3, Hct 36.6, Cr 0.8. UA positive. CTAP demonstrates bilateral nephroureteral tubes in appropriate position with no hydronephrosis bilaterally. Bladder is decompressed and prostate is unremarkable.     Hematuria can sometimes occur in setting of UTI and can be intermittent for as long as nephroureteral stents are in place. Cola colored urine will persist as old clot is lysed in the urine. Bilateral flank pain and burning with urination may be 2/2 cystitis/ ascending UTI and may improve w antibiotics. No intervention currently indicated as patient continues to void appropriately with decompressed bladder, normal H/H, normal Cr, and no hydronephrosis bilaterally    Recommendations:  - Recommend repeat CT scan or RBUS to evaluate for clot in bladder  - suspect that etiology of hematuria from PCNU's is from radiation cystitis and hematuria refluxing up the PCNU's.   - Attempted to place davis catheter but unsuccessful likely from h/x of radiation and stricture disease  - Pending findings on US vs CT but will likely recommend conversion of PCNU's to PCN's to prevent refluxing up the PCNU's and to allow bladder to clot off. Discussed with patient as probable next step  - Ditropan for PRN bladder spasms    D/w Dr. Magno Kiran [facility]  [address] [place] [phone]

## 2025-06-13 NOTE — PROGRESS NOTE ADULT - PROBLEM SELECTOR PLAN 1
UA positive with bilateral nephrostomy site pain consistent with complex UTI without clinical or radiographic signs of pyelonephritis. Has a history of both sensitive and carbapenem-resistant pseudomonas.    - Zosyn (6/10PM -6/12), Cefepime 2g Q12 (6/12-  - UCx w/ >100k GNR; drawn from Nephrostomy tube   - Worsening tachycardia and pain; white count elevating  - ID consulted, appreciate recs   - F/u UCx  from both nephrostomy tubes UA positive with bilateral nephrostomy site pain consistent with complex UTI without clinical or radiographic signs of pyelonephritis. Has a history of both sensitive and carbapenem-resistant pseudomonas. During hospital course, worsening tachycardia and pain with white count elevating.    - Zosyn (6/10PM -6/12), Cefepime 2g Q12 (6/12-  - UCx w/ >100k GNR; drawn from Nephrostomy tube   - ID following, appreciate recs UA positive with bilateral nephrostomy site pain consistent with complex UTI without clinical or radiographic signs of pyelonephritis. Has a history of both sensitive and carbapenem-resistant pseudomonas. During hospital course, worsening tachycardia and pain with white count elevating. Also has history of secondary syphillis which was treated ~1 year ago.    - Zosyn (6/10PM -6/12), Cefepime 2g Q12 (6/12-  - UCx w/ >100k GNR; drawn from Nephrostomy tube   - ID following, appreciate recs  - Check RPR level per ID

## 2025-06-13 NOTE — PROGRESS NOTE ADULT - ASSESSMENT
58 y/o M w/ PMhx of HTN, HLD, anxiety/depression, HIV on Biktarvy (CD4 624 in 12/2024 and VL UD 5/25),  h/o anal and prostate Ca (s/p chemotherapy + radiation in 2020 c/b bilateral nephrostomy tubes and ostomy),  ureteral stricture s/p stenting and bow with b/l nephroureteral tubes (last exchanged 5/9), recent parastomal hernia repair with mesh in 4/2025 admitted to [facility] on 6/10 for b/l flank pain, hematuria  Pt afebrile, initially w/o leukocytosis, now 13, UCx w/ GNR  CT A/P with b/l PCT with distal tips coiled in the bladder  D/w outpatient ID doctor Dr. 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   On exam, pt with some tenderness at site of PCNs, gross blood in both nephrostomy bags  Overall, UTI, Positive culture finding  - Cefepime 2 g q 12 to cover organisms grown in past (enterobacter, pseudomonas)   - C/w Biktarvy, HIV well controlled   - F/u UCx, GNR  - F/U IR/Urology  - F/U BCXs  - F/U screen    Nick Albarran MD  Contact on TEAMS messaging from 9am - 5pm  From 5pm-9am, on weekends, or if no response call [phone]    Antibiotic decision making based on local antibiogram and available recent culture results 58 y/o M w/ PMhx of HTN, HLD, anxiety/depression, HIV on Biktarvy (CD4 624 in 12/2024 and VL UD 5/25),  h/o anal and prostate Ca (s/p chemotherapy + radiation in 2020 c/b bilateral nephrostomy tubes and ostomy),  ureteral stricture s/p stenting and bow with b/l nephroureteral tubes (last exchanged 5/9), recent parastomal hernia repair with mesh in 4/2025 admitted to [facility] on 6/10 for b/l flank pain, hematuria  Pt afebrile, initially w/o leukocytosis, now 13, UCx w/ GNR  CT A/P with b/l PCT with distal tips coiled in the bladder  D/w outpatient ID doctor Dr. 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   On exam, pt with some tenderness at site of PCNs, gross blood in both nephrostomy bags  Overall, UTI, Positive culture finding  - Cefepime 2 g q 12 to cover organisms grown in past (enterobacter, pseudomonas)   - C/w Biktarvy, HIV well controlled   - F/u UCx, GNR  - F/U IR/Urology  - F/U BCXs  - F/U syphilis screen--please note patient will most likely be sent out on IV course of PCN on discharge    Nick Albarran MD  Contact on TEAMS messaging from 9am - 5pm  From 5pm-9am, on weekends, or if no response call [phone]    Antibiotic decision making based on local antibiogram and available recent culture results

## 2025-06-13 NOTE — PROGRESS NOTE ADULT - SUBJECTIVE AND OBJECTIVE BOX
PROGRESS NOTE  Patient: CARL ROWLEY   Authored by Maximo Wong, PGY-1 (Internal Medicine)    INTERVAL HX:  - NAEON  -     Review of Systems:  Unchanged from prior unless noted above.    Allergies:  No Known Allergies      Physical Exam:  ********      Medications:  acetaminophen     Tablet .. 650 milliGRAM(s) Oral every 6 hours PRN  bictegravir 50 mG/emtricitabine 200 mG/tenofovir alafenamide 25 mG (BIKTARVY) 1 Tablet(s) Oral daily  bisacodyl 5 milliGRAM(s) Oral every 12 hours  buPROPion XL (24-Hour) . 150 milliGRAM(s) Oral daily  cefepime   IVPB      cefepime   IVPB 2000 milliGRAM(s) IV Intermittent every 12 hours  cholecalciferol 2000 Unit(s) Oral daily  clonazePAM  Tablet 1 milliGRAM(s) Oral two times a day PRN  diltiazem    milliGRAM(s) Oral daily  ezetimibe 10 milliGRAM(s) Oral at bedtime  melatonin 3 milliGRAM(s) Oral at bedtime PRN  metoprolol tartrate 25 milliGRAM(s) Oral two times a day  morphine  - Injectable 2 milliGRAM(s) IV Push every 4 hours PRN  multivitamin 1 Tablet(s) Oral daily  oxyCODONE    IR 5 milliGRAM(s) Oral every 4 hours PRN  polyethylene glycol 3350 17 Gram(s) Oral two times a day PRN  senna 2 Tablet(s) Oral at bedtime PRN  traZODone 50 milliGRAM(s) Oral at bedtime  zolpidem 5 milliGRAM(s) Oral at bedtime PRN  zolpidem 5 milliGRAM(s) Oral at bedtime PRN    Vitals:  T(C): 37.2 (06-13-25 @ 04:50), Max: 37.2 (06-13-25 @ 04:50)  HR: 140 (06-13-25 @ 04:50) (112 - 140)  BP: 129/90 (06-13-25 @ 04:50) (123/69 - 131/94)  RR: 17 (06-13-25 @ 04:50) (17 - 18)  SpO2: 100% (06-13-25 @ 04:50) (99% - 100%)  I/O's:    06-12-25 @ 07:01  -  06-13-25 @ 07:00  --------------------------------------------------------  IN: 0 mL / OUT: 1065 mL / NET: -1065 mL        Labs:                        11.5   15.37 )-----------( 397      ( 12 Jun 2025 18:05 )             32.8     06-12    137  |  101  |  18  ----------------------------<  137[H]  3.7   |  24  |  1.00    Ca    9.4      12 Jun 2025 07:05  Phos  3.4     06-12  Mg     2.10     06-12    TPro  7.1  /  Alb  3.8  /  TBili  0.4  /  DBili  x   /  AST  14  /  ALT  14  /  AlkPhos  91  06-12          Radiology/Procedures: Reviewed.   PROGRESS NOTE  Patient: CARL ROWLEY   Authored by Maximo Wong, PGY-1 (Internal Medicine)    INTERVAL HX:  - NAEON  - In distress and pain, bloody output from R>L nephroureterostomy tubes  - Urology  attempted to place davis without success for CBI    Review of Systems:  Unchanged from prior unless noted above.    Allergies:  No Known Allergies      Physical Exam:  GENERAL: NAD, lying in bed comfortably  HEAD:  Atraumatic, Normocephalic  EYES: EOMI, conjunctiva and sclera clear  ENT: Moist mucous membranes  CHEST/LUNG: Clear to auscultation bilaterally; No rales, rhonchi, wheezing, or rubs. Unlabored respirations  HEART: Regular rate and rhythm; No murmurs, rubs, or gallops  ABDOMEN: Soft, ostomy present near umbilicus, tender to moderate palpation near stoma, nondistended, L >R port wine colored nephroureterostomy tube output with clots in line  EXTREMITIES:  No clubbing, cyanosis, or edema  NERVOUS SYSTEM:  A&Ox3, no focal deficits   SKIN: No rashes or lesions      VS  T(C): 37.2 (06-13-25 @ 04:50), Max: 37.2 (06-13-25 @ 04:50)  HR: 120 (06-13-25 @ 09:50) (115 - 140)  BP: 120/94 (06-13-25 @ 09:50) (120/94 - 131/94)  RR: 17 (06-13-25 @ 04:50) (17 - 18)  SpO2: 100% (06-13-25 @ 04:50) (99% - 100%)    LABS (available at time of writing 06-13-25 @ 14:57):                         10.2   11.93 )-----------( 344      ( 13 Jun 2025 06:38 )             30.2     06-13    139  |  103  |  23  ----------------------------<  139[H]  4.2   |  22  |  1.11    Ca    9.5      13 Jun 2025 06:38  Phos  2.9     06-13  Mg     2.20     06-13    TPro  6.8  /  Alb  3.7  /  TBili  0.2  /  DBili  x   /  AST  14  /  ALT  16  /  AlkPhos  81  06-13      Urinalysis Basic - ( 13 Jun 2025 06:38 )    Color: x / Appearance: x / SG: x / pH: x  Gluc: 139 mg/dL / Ketone: x  / Bili: x / Urobili: x   Blood: x / Protein: x / Nitrite: x   Leuk Esterase: x / RBC: x / WBC x   Sq Epi: x / Non Sq Epi: x / Bacteria: x          Urinalysis with Rflx Culture (collected 06-10-25 @ 09:50)    Urinalysis with Rflx Culture (collected 06-10-25 @ 08:00)    Culture - Urine (collected 06-10-25 @ 08:00)  Source: Urine  Preliminary Report (06-11-25 @ 16:53):    >100,000 CFU/ml Gram Negative Rods        Medications:   acetaminophen     Tablet .. 650 milliGRAM(s) Oral every 6 hours PRN  bictegravir 50 mG/emtricitabine 200 mG/tenofovir alafenamide 25 mG (BIKTARVY) 1 Tablet(s) Oral daily  bisacodyl 5 milliGRAM(s) Oral every 12 hours  buPROPion XL (24-Hour) . 150 milliGRAM(s) Oral daily  cefepime   IVPB      cefepime   IVPB 2000 milliGRAM(s) IV Intermittent every 12 hours  cholecalciferol 2000 Unit(s) Oral daily  clonazePAM  Tablet 1 milliGRAM(s) Oral two times a day PRN  diltiazem    milliGRAM(s) Oral daily  ezetimibe 10 milliGRAM(s) Oral at bedtime  HYDROmorphone  Injectable 0.5 milliGRAM(s) IV Push every 4 hours PRN  melatonin 3 milliGRAM(s) Oral at bedtime PRN  metoprolol tartrate 50 milliGRAM(s) Oral every 12 hours  multivitamin 1 Tablet(s) Oral daily  oxybutynin 5 milliGRAM(s) Oral three times a day PRN  oxyCODONE    IR 5 milliGRAM(s) Oral every 4 hours PRN  phenazopyridine 100 milliGRAM(s) Oral every 8 hours  polyethylene glycol 3350 17 Gram(s) Oral two times a day PRN  senna 2 Tablet(s) Oral at bedtime PRN  sodium chloride 0.9%. 1000 milliLiter(s) IV Continuous <Continuous>  traZODone 50 milliGRAM(s) Oral at bedtime  zolpidem 5 milliGRAM(s) Oral at bedtime PRN  zolpidem 5 milliGRAM(s) Oral at bedtime PRN      RADIOLOGY, EKG & ADDITIONAL TESTS: Reviewed.

## 2025-06-13 NOTE — PROVIDER CONTACT NOTE (OTHER) - ASSESSMENT
, /90, T 99, patient reporting 7/10 pain
Pt reporting 10/10 pain, shivering, crying, 
No acute distress. Pt reports 6/10 abdominal pain

## 2025-06-13 NOTE — CHART NOTE - NSCHARTNOTEFT_GEN_A_CORE
US reviewed which shows clot in the bladder and hemorrhagic material in the collecting system likely from reflux up the PCNU's. Recommend IR consult for exchange of PCNU's for PCN's and allow the bladder to clot off.    [facility]  [address] [place] [address], [place] [address]  [phone]

## 2025-06-13 NOTE — PROGRESS NOTE ADULT - PROBLEM SELECTOR PLAN 9
DVT ppx: SCD given hematuria  Diet: Regular  Dispo: Pending Hospital course; no PT needs  Code Status: Full Code DVT ppx: SCD given hematuria  Diet: Regular  Dispo: Pending Hospital course; no skilled PT needs  Code Status: Full Code

## 2025-06-13 NOTE — PROGRESS NOTE ADULT - SUBJECTIVE AND OBJECTIVE BOX
Subjective  Denies fevers, chills, nausea, vomiting, SOB, CP. Patient continuing to have significant hematuria from B/l PCNU's and now starting to have bladder spasms with passage of clot from below.     Objective    Vital signs  T(F): , Max: 99 (06-13-25 @ 04:50)  HR: 120 (06-13-25 @ 09:50)  BP: 120/94 (06-13-25 @ 09:50)  SpO2: 100% (06-13-25 @ 04:50)  Wt(kg): --    Output     OUT:    Nephrostomy Tube (mL): 405 mL    Nephrostomy Tube (mL): 660 mL  Total OUT: 1065 mL    Total NET: -1065 mL          Gen: NAD  Abd: soft, nontender, nondistended  : b/l PCNU's in place draining maroon colored urine, has dark output around diaper from below    Labs      06-13 @ 06:38    WBC 11.93 / Hct 30.2  / SCr 1.11     06-12 @ 18:05    WBC 15.37 / Hct 32.8  / SCr --           Urinalysis with Rflx Culture (collected 06-10-25 @ 09:50)    Urinalysis with Rflx Culture (collected 06-10-25 @ 08:00)    Culture - Urine (collected 06-10-25 @ 08:00)  Source: Urine  Preliminary Report (06-11-25 @ 16:53):    >100,000 CFU/ml Gram Negative Rods        Urine Cx: ?  Blood Cx: ?    Imaging

## 2025-06-13 NOTE — PROVIDER CONTACT NOTE (OTHER) - BACKGROUND
Admitted for tubulointerstitial nephritis

## 2025-06-13 NOTE — PROGRESS NOTE ADULT - ASSESSMENT
59M w/ hx of prostate + anal cancer (s/p chemotherapy + radiation in 2020 c/b bilateral nephrostomy tubes and ostomy), HIV on Biktarvy, HTN, HLD, anxiety/depression presenting for bilateral flank pain and hematuria found to have positive UA and normal functioning nephrostomy tubes per urology suggestive of UTI. Treating with Zosyn for now given history of pseudomonas. Course complicated by hematuria into R nephrourterostomy bag.   59M w/ hx of prostate + anal cancer (s/p chemotherapy + radiation in 2020 c/b bilateral nephrostomy tubes and ostomy), HIV on Biktarvy, HTN, HLD, anxiety/depression presenting for bilateral flank pain and hematuria found to have positive UA and normal functioning nephrostomy tubes per urology suggestive of UTI. Transitioned from Zosyn to cefepime per ID recommendations given history of pseudomonas. Course complicated by port wine colored hematuria into bilateral L>R nephrourterostomy bags. Originally planned for CBI by urology but unable to place davis due to strictures. Pending repeat imaging for assessment of KUB clots.

## 2025-06-14 LAB
ANION GAP SERPL CALC-SCNC: 10 MMOL/L — SIGNIFICANT CHANGE UP (ref 7–14)
APTT BLD: 25.7 SEC — LOW (ref 26.1–36.8)
BUN SERPL-MCNC: 26 MG/DL — HIGH (ref 7–23)
CALCIUM SERPL-MCNC: 9.2 MG/DL — SIGNIFICANT CHANGE UP (ref 8.4–10.5)
CHLORIDE SERPL-SCNC: 104 MMOL/L — SIGNIFICANT CHANGE UP (ref 98–107)
CO2 SERPL-SCNC: 25 MMOL/L — SIGNIFICANT CHANGE UP (ref 22–31)
CREAT SERPL-MCNC: 1 MG/DL — SIGNIFICANT CHANGE UP (ref 0.5–1.3)
EGFR: 87 ML/MIN/1.73M2 — SIGNIFICANT CHANGE UP
EGFR: 87 ML/MIN/1.73M2 — SIGNIFICANT CHANGE UP
GLUCOSE SERPL-MCNC: 122 MG/DL — HIGH (ref 70–99)
HCT VFR BLD CALC: 26.7 % — LOW (ref 39–50)
HCT VFR BLD CALC: 27.2 % — LOW (ref 39–50)
HGB BLD-MCNC: 9 G/DL — LOW (ref 13–17)
HGB BLD-MCNC: 9 G/DL — LOW (ref 13–17)
INR BLD: 1.13 RATIO — SIGNIFICANT CHANGE UP (ref 0.85–1.16)
MAGNESIUM SERPL-MCNC: 2.3 MG/DL — SIGNIFICANT CHANGE UP (ref 1.6–2.6)
MCHC RBC-ENTMCNC: 29.7 PG — SIGNIFICANT CHANGE UP (ref 27–34)
MCHC RBC-ENTMCNC: 30.1 PG — SIGNIFICANT CHANGE UP (ref 27–34)
MCHC RBC-ENTMCNC: 33.1 G/DL — SIGNIFICANT CHANGE UP (ref 32–36)
MCHC RBC-ENTMCNC: 33.7 G/DL — SIGNIFICANT CHANGE UP (ref 32–36)
MCV RBC AUTO: 88.1 FL — SIGNIFICANT CHANGE UP (ref 80–100)
MCV RBC AUTO: 91 FL — SIGNIFICANT CHANGE UP (ref 80–100)
NRBC # BLD AUTO: 0 K/UL — SIGNIFICANT CHANGE UP (ref 0–0)
NRBC # BLD AUTO: 0.02 K/UL — HIGH (ref 0–0)
NRBC # FLD: 0 K/UL — SIGNIFICANT CHANGE UP (ref 0–0)
NRBC # FLD: 0.02 K/UL — HIGH (ref 0–0)
NRBC BLD AUTO-RTO: 0 /100 WBCS — SIGNIFICANT CHANGE UP (ref 0–0)
NRBC BLD AUTO-RTO: 0 /100 WBCS — SIGNIFICANT CHANGE UP (ref 0–0)
PHOSPHATE SERPL-MCNC: 2.3 MG/DL — LOW (ref 2.5–4.5)
PLATELET # BLD AUTO: 307 K/UL — SIGNIFICANT CHANGE UP (ref 150–400)
PLATELET # BLD AUTO: 322 K/UL — SIGNIFICANT CHANGE UP (ref 150–400)
POTASSIUM SERPL-MCNC: 3.9 MMOL/L — SIGNIFICANT CHANGE UP (ref 3.5–5.3)
POTASSIUM SERPL-SCNC: 3.9 MMOL/L — SIGNIFICANT CHANGE UP (ref 3.5–5.3)
PROTHROM AB SERPL-ACNC: 13.4 SEC — SIGNIFICANT CHANGE UP (ref 9.9–13.4)
RBC # BLD: 2.99 M/UL — LOW (ref 4.2–5.8)
RBC # BLD: 3.03 M/UL — LOW (ref 4.2–5.8)
RBC # FLD: 14.3 % — SIGNIFICANT CHANGE UP (ref 10.3–14.5)
RBC # FLD: 14.3 % — SIGNIFICANT CHANGE UP (ref 10.3–14.5)
RPR SERPL-ACNC: SIGNIFICANT CHANGE UP
SODIUM SERPL-SCNC: 139 MMOL/L — SIGNIFICANT CHANGE UP (ref 135–145)
T PALLIDUM AB TITR SER: POSITIVE
T PALLIDUM IGG SER QL IF: SIGNIFICANT CHANGE UP
WBC # BLD: 7.84 K/UL — SIGNIFICANT CHANGE UP (ref 3.8–10.5)
WBC # BLD: 8.78 K/UL — SIGNIFICANT CHANGE UP (ref 3.8–10.5)
WBC # FLD AUTO: 7.84 K/UL — SIGNIFICANT CHANGE UP (ref 3.8–10.5)
WBC # FLD AUTO: 8.78 K/UL — SIGNIFICANT CHANGE UP (ref 3.8–10.5)

## 2025-06-14 PROCEDURE — 99232 SBSQ HOSP IP/OBS MODERATE 35: CPT

## 2025-06-14 PROCEDURE — 99232 SBSQ HOSP IP/OBS MODERATE 35: CPT | Mod: GC

## 2025-06-14 RX ORDER — SENNA 187 MG
2 TABLET ORAL AT BEDTIME
Refills: 0 | Status: DISCONTINUED | OUTPATIENT
Start: 2025-06-14 | End: 2025-06-14

## 2025-06-14 RX ORDER — POLYETHYLENE GLYCOL 3350 17 G/17G
17 POWDER, FOR SOLUTION ORAL
Refills: 0 | Status: DISCONTINUED | OUTPATIENT
Start: 2025-06-14 | End: 2025-06-24

## 2025-06-14 RX ORDER — SOD PHOS DI, MONO/K PHOS MONO 250 MG
2 TABLET ORAL EVERY 6 HOURS
Refills: 0 | Status: COMPLETED | OUTPATIENT
Start: 2025-06-14 | End: 2025-06-15

## 2025-06-14 RX ORDER — SODIUM CHLORIDE 9 G/1000ML
1000 INJECTION, SOLUTION INTRAVENOUS ONCE
Refills: 0 | Status: COMPLETED | OUTPATIENT
Start: 2025-06-14 | End: 2025-06-14

## 2025-06-14 RX ADMIN — METOPROLOL SUCCINATE 50 MILLIGRAM(S): 50 TABLET, EXTENDED RELEASE ORAL at 05:44

## 2025-06-14 RX ADMIN — BUPROPION HYDROBROMIDE 150 MILLIGRAM(S): 522 TABLET, EXTENDED RELEASE ORAL at 11:40

## 2025-06-14 RX ADMIN — Medication 50 MILLIGRAM(S): at 21:10

## 2025-06-14 RX ADMIN — Medication 100 MILLIGRAM(S): at 05:45

## 2025-06-14 RX ADMIN — Medication 5 MILLIGRAM(S): at 21:10

## 2025-06-14 RX ADMIN — Medication 2 TABLET(S): at 09:56

## 2025-06-14 RX ADMIN — SODIUM CHLORIDE 500 MILLILITER(S): 9 INJECTION, SOLUTION INTRAVENOUS at 09:56

## 2025-06-14 RX ADMIN — POLYETHYLENE GLYCOL 3350 17 GRAM(S): 17 POWDER, FOR SOLUTION ORAL at 09:56

## 2025-06-14 RX ADMIN — EZETIMIBE 10 MILLIGRAM(S): 10 TABLET ORAL at 21:10

## 2025-06-14 RX ADMIN — Medication 2 TABLET(S): at 18:01

## 2025-06-14 RX ADMIN — Medication 1 TABLET(S): at 11:38

## 2025-06-14 RX ADMIN — BICTEGRAVIR SODIUM, EMTRICITABINE, AND TENOFOVIR ALAFENAMIDE FUMARATE 1 TABLET(S): 50; 200; 25 TABLET ORAL at 11:38

## 2025-06-14 RX ADMIN — Medication 2000 UNIT(S): at 11:38

## 2025-06-14 RX ADMIN — CEFEPIME 100 MILLIGRAM(S): 2 INJECTION, POWDER, FOR SOLUTION INTRAVENOUS at 18:00

## 2025-06-14 RX ADMIN — Medication 0.5 MILLIGRAM(S): at 20:37

## 2025-06-14 RX ADMIN — Medication 0.5 MILLIGRAM(S): at 13:07

## 2025-06-14 RX ADMIN — Medication 0.5 MILLIGRAM(S): at 07:43

## 2025-06-14 RX ADMIN — Medication 0.5 MILLIGRAM(S): at 21:37

## 2025-06-14 RX ADMIN — Medication 5 MILLIGRAM(S): at 05:44

## 2025-06-14 RX ADMIN — Medication 0.5 MILLIGRAM(S): at 07:28

## 2025-06-14 RX ADMIN — CEFEPIME 100 MILLIGRAM(S): 2 INJECTION, POWDER, FOR SOLUTION INTRAVENOUS at 06:19

## 2025-06-14 RX ADMIN — Medication 0.5 MILLIGRAM(S): at 12:52

## 2025-06-14 RX ADMIN — DILTIAZEM HYDROCHLORIDE 240 MILLIGRAM(S): 240 TABLET, EXTENDED RELEASE ORAL at 05:44

## 2025-06-14 NOTE — PROGRESS NOTE ADULT - PROBLEM SELECTOR PLAN 9
DVT ppx: SCD given hematuria  Diet: Regular  Dispo: Pending Hospital course; no skilled PT needs  Code Status: Full Code

## 2025-06-14 NOTE — PROGRESS NOTE ADULT - SUBJECTIVE AND OBJECTIVE BOX
Subjective:  Resting comfortably, mild abdominal discomfort is unchanged from yesterday    Objective:    Vital signs  T(C): , Max: 37.4 (06-13-25 @ 16:30)  HR: 88 (06-14-25 @ 07:28)  BP: 110/80 (06-14-25 @ 07:28)  SpO2: 100% (06-14-25 @ 07:28)  Wt(kg): --    Output     06-13 @ 07:01  -  06-14 @ 07:00  --------------------------------------------------------  IN: 0 mL / OUT: 1040 mL / NET: -1040 mL        Gen: NAD  Abd: soft, nondistended  : bilateral NU's draining marroon fluid    Labs                        9.0    7.84  )-----------( 322      ( 14 Jun 2025 06:58 )             26.7     14 Jun 2025 06:58    139    |  104    |  26     ----------------------------<  122    3.9     |  25     |  1.00     Ca    9.2        14 Jun 2025 06:58  Phos  2.3       14 Jun 2025 06:58  Mg     2.30      14 Jun 2025 06:58        Urine Cx: ?  Blood Cx: ?      Imaging

## 2025-06-14 NOTE — CONSULT NOTE ADULT - ASSESSMENT
Interventional Radiology    Evaluate for Procedure:     HPI: 58 yo M w/ PMHx of HIV on Biktarvy, HTN, prostate cancer and anal cancer s/p chemotherapy/radiation c/b b/l nephrostomy tubes (placed in Feb '24, converted March '25, R last exchanged on 5/10, L exchanged and upsize 5/30)  presents w/ B/L flank pain and hematuria. IR c/s for conversion of PCNU back to PCN.    Allergies: No Known Allergies    Medications (Abx/Cardiac/Anticoagulation/Blood Products)    bictegravir 50 mG/emtricitabine 200 mG/tenofovir alafenamide 25 mG (BIKTARVY): 1 Tablet(s) Oral (06-14 @ 11:38)  cefepime   IVPB: 100 mL/Hr IV Intermittent (06-12 @ 17:24)  cefepime   IVPB: 100 mL/Hr IV Intermittent (06-14 @ 06:19)  diltiazem   CD: 240 milliGRAM(s) Oral (06-14 @ 05:44)  metoprolol tartrate: 25 milliGRAM(s) Oral (06-13 @ 09:51)  metoprolol tartrate: 50 milliGRAM(s) Oral (06-14 @ 05:44)  metoprolol tartrate: 25 milliGRAM(s) Oral (06-13 @ 05:28)  piperacillin/tazobactam IVPB..: 25 mL/Hr IV Intermittent (06-12 @ 14:46)    Data:    T(C): 36.9  HR: 88  BP: 110/80  RR: 18  SpO2: 100%    -WBC 7.84 / HgB 9.0 / Hct 26.7 / Plt 322  -Na 139 / Cl 104 / BUN 26 / Glucose 122  -K 3.9 / CO2 25 / Cr 1.00  -ALT -- / Alk Phos -- / T.Bili --  -INR 1.13 / PTT 25.7      Radiology:     Assessment/Plan:    58 yo M w/ PMHx of HIV on Biktarvy, HTN, prostate cancer and anal cancer s/p chemotherapy/radiation c/b b/l nephrostomy tubes (placed in Feb '24, converted March '25, R last exchanged on 5/10, L exchanged and upsize 5/30)  presents w/ B/L flank pain and hematuria. IR c/s for conversion of PCNU back to PCN.    -- IR will plan to perform 6/16 under local  -- Patient does not need to be NPO for procedure  -- AM CBC, BMP, Coags.  -- Patient not currently on AC. Please contact IR prior to initiation for proper hold parameters.  -- Please place IR procedure request order under Dr. Pagan  -- Please write IR pre-procedure note    Han Haney M.D.  PGY5/R4, Interventional Radiology Senior Resident    -Available on [employer] TEAMS for all non-urgent questions  -Emergent issues: [facility]-p.[phone]; [facility]-p.[phone] ([phone])  -Non-emergent consults: Please place a [facility] order "Consult-Interventional Radiology" with an appropriate callback number  -Scheduling questions: [facility]: [phone]; [facility]: [phone]  -Clinic/Outpatient booking: [facility]: [phone]; [facility]: [phone]

## 2025-06-14 NOTE — PROGRESS NOTE ADULT - PROBLEM SELECTOR PLAN 1
UA positive with bilateral nephrostomy site pain consistent with complex UTI without clinical or radiographic signs of pyelonephritis. Has a history of both sensitive and carbapenem-resistant pseudomonas. During hospital course, worsening tachycardia and pain with white count elevating. Also has history of secondary syphillis which was treated ~1 year ago.    - Zosyn (6/10PM -6/12), Cefepime 2g Q12 (6/12-  - UCx w/ >100k GNR; drawn from Nephrostomy tube   - ID following, appreciate recs  - Check RPR level per ID UA positive with bilateral nephrostomy site pain consistent with complex UTI without clinical or radiographic signs of pyelonephritis. Has a history of both sensitive and carbapenem-resistant pseudomonas. During hospital course, worsening tachycardia and pain with white count elevating. Also has history of secondary syphillis which was treated ~1 year ago.    - Zosyn (6/10PM -6/12), Cefepime 2g Q12 (6/12-  - UCx w/ >100k GNR; drawn from Nephrostomy tube   - ID following, appreciate recs  - f/u RPR level per ID

## 2025-06-14 NOTE — PROGRESS NOTE ADULT - ATTENDING COMMENTS
59M w/ hx of prostate + anal cancer (s/p chemotherapy + radiation in 2020 c/b bilateral nephrostomy tubes and ostomy), HIV on Biktarvy, HTN, HLD, anxiety/depression presenting for bilateral flank pain and hematuria found to have positive UA and hematuria on PCUN.       #Sepsis 2/2 pyelonephritis. ID cs, cont cefepime. fu urine culture >100K GNR. BCX Neg.  #Hematuria: urology consulted, recommends pcun to pcn conversion. IR consulted, plans for 6/16.  #hx of secondary syphilis, fu RPR.   # HTN c/w Cardizem, lopressor  # Dvt ppx: scds    rest as above

## 2025-06-14 NOTE — PROGRESS NOTE ADULT - ASSESSMENT
59M w/ hx of prostate + anal cancer (s/p chemotherapy + radiation in 2020 c/b bilateral nephrostomy tubes and ostomy), HIV on Biktarvy, HTN, HLD, anxiety/depression presenting for bilateral flank pain and hematuria found to have positive UA and normal functioning nephrostomy tubes per urology suggestive of UTI. Transitioned from Zosyn to cefepime per ID recommendations given history of pseudomonas. Course complicated by port wine colored hematuria into bilateral L>R nephrourterostomy bags. Originally planned for CBI by urology but unable to place davis due to strictures. Pending repeat imaging for assessment of KUB clots.   59M w/ hx of prostate + anal cancer (s/p chemotherapy + radiation in 2020 c/b bilateral nephrostomy tubes and ostomy), HIV on Biktarvy, HTN, HLD, anxiety/depression presenting for bilateral flank pain and hematuria found to have positive UA and normal functioning nephrostomy tubes per urology suggestive of UTI. Transitioned from Zosyn to cefepime per ID recommendations given history of pseudomonas. Course complicated by port wine colored hematuria into bilateral L>R nephrourterostomy bags. Originally planned for CBI by urology but unable to place davis due to strictures. KUB ultrasound showing clots in bladder so urology recommending IR exchange nephroureterostomy tubes with standard nephrostomy tubes to allow likely bleed to tamponade.

## 2025-06-14 NOTE — PROGRESS NOTE ADULT - PROBLEM SELECTOR PLAN 4
Sinus tach to 130s on 6/12 ekg w/ sinus tach. Likely iso pain vs. bleed vs. worsening sepsis +/- reflex from holding Metoprolol.     - Assess if patient may be withdrawing from home Klonopin  - Pain management and infection control   - monitor H&H while active hematuria.   - Restarted BB as Lopressor 50mg BID Sinus tach to 130s on 6/12 ekg w/ sinus tach. Likely iso pain vs. bleed vs. worsening sepsis +/- reflex from holding Metoprolol.     - Encourage home Klonopin use per patient usual schedule to avoid mild withdrawal symptoms  - Pain management and infection control   - plan per hematuria.   - Restarted BB as Lopressor 50mg BID  - Cont home diltiazem 240mg daily

## 2025-06-14 NOTE — PROGRESS NOTE ADULT - SUBJECTIVE AND OBJECTIVE BOX
PROGRESS NOTE  Patient: CARL ROWLEY   Authored by Maximo Wong, PGY-1 (Internal Medicine)    INTERVAL HX:  - NAEON  - In distress and pain, bloody output from R>L nephroureterostomy tubes  - Urology  attempted to place davis without success for CBI    Review of Systems:  Unchanged from prior unless noted above.    Allergies:  No Known Allergies      Physical Exam:  GENERAL: NAD, lying in bed comfortably  HEAD:  Atraumatic, Normocephalic  EYES: EOMI, conjunctiva and sclera clear  ENT: Moist mucous membranes  CHEST/LUNG: Clear to auscultation bilaterally; No rales, rhonchi, wheezing, or rubs. Unlabored respirations  HEART: Regular rate and rhythm; No murmurs, rubs, or gallops  ABDOMEN: Soft, ostomy present near umbilicus, tender to moderate palpation near stoma, nondistended, L >R port wine colored nephroureterostomy tube output with clots in line  EXTREMITIES:  No clubbing, cyanosis, or edema  NERVOUS SYSTEM:  A&Ox3, no focal deficits   SKIN: No rashes or lesions      VS  T(C): 37.2 (06-13-25 @ 04:50), Max: 37.2 (06-13-25 @ 04:50)  HR: 120 (06-13-25 @ 09:50) (115 - 140)  BP: 120/94 (06-13-25 @ 09:50) (120/94 - 131/94)  RR: 17 (06-13-25 @ 04:50) (17 - 18)  SpO2: 100% (06-13-25 @ 04:50) (99% - 100%)    LABS (available at time of writing 06-13-25 @ 14:57):                         10.2   11.93 )-----------( 344      ( 13 Jun 2025 06:38 )             30.2     06-13    139  |  103  |  23  ----------------------------<  139[H]  4.2   |  22  |  1.11    Ca    9.5      13 Jun 2025 06:38  Phos  2.9     06-13  Mg     2.20     06-13    TPro  6.8  /  Alb  3.7  /  TBili  0.2  /  DBili  x   /  AST  14  /  ALT  16  /  AlkPhos  81  06-13      Urinalysis Basic - ( 13 Jun 2025 06:38 )    Color: x / Appearance: x / SG: x / pH: x  Gluc: 139 mg/dL / Ketone: x  / Bili: x / Urobili: x   Blood: x / Protein: x / Nitrite: x   Leuk Esterase: x / RBC: x / WBC x   Sq Epi: x / Non Sq Epi: x / Bacteria: x          Urinalysis with Rflx Culture (collected 06-10-25 @ 09:50)    Urinalysis with Rflx Culture (collected 06-10-25 @ 08:00)    Culture - Urine (collected 06-10-25 @ 08:00)  Source: Urine  Preliminary Report (06-11-25 @ 16:53):    >100,000 CFU/ml Gram Negative Rods        Medications:   acetaminophen     Tablet .. 650 milliGRAM(s) Oral every 6 hours PRN  bictegravir 50 mG/emtricitabine 200 mG/tenofovir alafenamide 25 mG (BIKTARVY) 1 Tablet(s) Oral daily  bisacodyl 5 milliGRAM(s) Oral every 12 hours  buPROPion XL (24-Hour) . 150 milliGRAM(s) Oral daily  cefepime   IVPB      cefepime   IVPB 2000 milliGRAM(s) IV Intermittent every 12 hours  cholecalciferol 2000 Unit(s) Oral daily  clonazePAM  Tablet 1 milliGRAM(s) Oral two times a day PRN  diltiazem    milliGRAM(s) Oral daily  ezetimibe 10 milliGRAM(s) Oral at bedtime  HYDROmorphone  Injectable 0.5 milliGRAM(s) IV Push every 4 hours PRN  melatonin 3 milliGRAM(s) Oral at bedtime PRN  metoprolol tartrate 50 milliGRAM(s) Oral every 12 hours  multivitamin 1 Tablet(s) Oral daily  oxybutynin 5 milliGRAM(s) Oral three times a day PRN  oxyCODONE    IR 5 milliGRAM(s) Oral every 4 hours PRN  phenazopyridine 100 milliGRAM(s) Oral every 8 hours  polyethylene glycol 3350 17 Gram(s) Oral two times a day PRN  senna 2 Tablet(s) Oral at bedtime PRN  sodium chloride 0.9%. 1000 milliLiter(s) IV Continuous <Continuous>  traZODone 50 milliGRAM(s) Oral at bedtime  zolpidem 5 milliGRAM(s) Oral at bedtime PRN  zolpidem 5 milliGRAM(s) Oral at bedtime PRN      RADIOLOGY, EKG & ADDITIONAL TESTS: Reviewed.    PROGRESS NOTE  Patient: CARL ROWLEY   Authored by Maximo Wong, PGY-1 (Internal Medicine)    INTERVAL HX:  - NAEON  - Not in distress anymore, bloody output from R>L nephroureterostomy tubes    Review of Systems:  Unchanged from prior unless noted above.    Allergies:  No Known Allergies      Physical Exam:  GENERAL: NAD, lying in bed comfortably  HEAD:  Atraumatic, Normocephalic  EYES: EOMI, conjunctiva and sclera clear  ENT: Moist mucous membranes  CHEST/LUNG: Clear to auscultation bilaterally; No rales, rhonchi, wheezing, or rubs. Unlabored respirations  HEART: Regular rate and rhythm; No murmurs, rubs, or gallops  ABDOMEN: Soft, ostomy present near umbilicus, tender to moderate palpation near stoma, nondistended, L >R port wine colored nephroureterostomy tube output with clots in line  EXTREMITIES:  No clubbing, cyanosis, or edema  NERVOUS SYSTEM:  A&Ox3, no focal deficits   SKIN: No rashes or lesions      VS  T(C): 37.2 (06-13-25 @ 04:50), Max: 37.2 (06-13-25 @ 04:50)  HR: 120 (06-13-25 @ 09:50) (115 - 140)  BP: 120/94 (06-13-25 @ 09:50) (120/94 - 131/94)  RR: 17 (06-13-25 @ 04:50) (17 - 18)  SpO2: 100% (06-13-25 @ 04:50) (99% - 100%)    LABS (available at time of writing 06-13-25 @ 14:57):                         10.2   11.93 )-----------( 344      ( 13 Jun 2025 06:38 )             30.2     06-13    139  |  103  |  23  ----------------------------<  139[H]  4.2   |  22  |  1.11    Ca    9.5      13 Jun 2025 06:38  Phos  2.9     06-13  Mg     2.20     06-13    TPro  6.8  /  Alb  3.7  /  TBili  0.2  /  DBili  x   /  AST  14  /  ALT  16  /  AlkPhos  81  06-13      Urinalysis Basic - ( 13 Jun 2025 06:38 )    Color: x / Appearance: x / SG: x / pH: x  Gluc: 139 mg/dL / Ketone: x  / Bili: x / Urobili: x   Blood: x / Protein: x / Nitrite: x   Leuk Esterase: x / RBC: x / WBC x   Sq Epi: x / Non Sq Epi: x / Bacteria: x          Urinalysis with Rflx Culture (collected 06-10-25 @ 09:50)    Urinalysis with Rflx Culture (collected 06-10-25 @ 08:00)    Culture - Urine (collected 06-10-25 @ 08:00)  Source: Urine  Preliminary Report (06-11-25 @ 16:53):    >100,000 CFU/ml Gram Negative Rods        Medications:   acetaminophen     Tablet .. 650 milliGRAM(s) Oral every 6 hours PRN  bictegravir 50 mG/emtricitabine 200 mG/tenofovir alafenamide 25 mG (BIKTARVY) 1 Tablet(s) Oral daily  bisacodyl 5 milliGRAM(s) Oral every 12 hours  buPROPion XL (24-Hour) . 150 milliGRAM(s) Oral daily  cefepime   IVPB      cefepime   IVPB 2000 milliGRAM(s) IV Intermittent every 12 hours  cholecalciferol 2000 Unit(s) Oral daily  clonazePAM  Tablet 1 milliGRAM(s) Oral two times a day PRN  diltiazem    milliGRAM(s) Oral daily  ezetimibe 10 milliGRAM(s) Oral at bedtime  HYDROmorphone  Injectable 0.5 milliGRAM(s) IV Push every 4 hours PRN  melatonin 3 milliGRAM(s) Oral at bedtime PRN  metoprolol tartrate 50 milliGRAM(s) Oral every 12 hours  multivitamin 1 Tablet(s) Oral daily  oxybutynin 5 milliGRAM(s) Oral three times a day PRN  oxyCODONE    IR 5 milliGRAM(s) Oral every 4 hours PRN  phenazopyridine 100 milliGRAM(s) Oral every 8 hours  polyethylene glycol 3350 17 Gram(s) Oral two times a day PRN  senna 2 Tablet(s) Oral at bedtime PRN  sodium chloride 0.9%. 1000 milliLiter(s) IV Continuous <Continuous>  traZODone 50 milliGRAM(s) Oral at bedtime  zolpidem 5 milliGRAM(s) Oral at bedtime PRN  zolpidem 5 milliGRAM(s) Oral at bedtime PRN      RADIOLOGY, EKG & ADDITIONAL TESTS: Reviewed.    PROGRESS NOTE  Patient: CARL ROWLEY   Authored by Maximo Wong, PGY-1 (Internal Medicine)    INTERVAL HX:  - NAEON  - Not in distress anymore, bloody output from R>L nephroureterostomy tubes    Review of Systems:  Unchanged from prior unless noted above.    Allergies:  No Known Allergies      Physical Exam:  GENERAL: NAD, lying in bed comfortably  HEAD:  Atraumatic, Normocephalic  EYES: EOMI, conjunctiva and sclera clear  ENT: Moist mucous membranes  CHEST/LUNG: Clear to auscultation bilaterally; No rales, rhonchi, wheezing, or rubs. Unlabored respirations  HEART: Regular rate and rhythm; No murmurs, rubs, or gallops  ABDOMEN: Soft, ostomy present near umbilicus, tender to moderate palpation near stoma, nondistended, L >R port wine colored nephroureterostomy tube output with clots in line  EXTREMITIES:  No clubbing, cyanosis, or edema  NERVOUS SYSTEM:  A&Ox3, no focal deficits   SKIN: No rashes or lesions    VS  T(C): 36.9 (06-14-25 @ 05:40), Max: 37.4 (06-13-25 @ 16:30)  HR: 88 (06-14-25 @ 07:28) (88 - 120)  BP: 110/80 (06-14-25 @ 07:28) (103/74 - 131/96)  RR: 18 (06-14-25 @ 07:28) (18 - 18)  SpO2: 100% (06-14-25 @ 07:28) (97% - 100%)    LABS (available at time of writing 06-14-25 @ 11:50):                         9.0    7.84  )-----------( 322      ( 14 Jun 2025 06:58 )             26.7     06-14    139  |  104  |  26[H]  ----------------------------<  122[H]  3.9   |  25  |  1.00    Ca    9.2      14 Jun 2025 06:58  Phos  2.3     06-14  Mg     2.30     06-14    TPro  6.8  /  Alb  3.7  /  TBili  0.2  /  DBili  x   /  AST  14  /  ALT  16  /  AlkPhos  81  06-13    PT/INR - ( 14 Jun 2025 06:58 )   PT: 13.4 sec;   INR: 1.13 ratio         PTT - ( 14 Jun 2025 06:58 )  PTT:25.7 sec  Urinalysis Basic - ( 14 Jun 2025 06:58 )    Color: x / Appearance: x / SG: x / pH: x  Gluc: 122 mg/dL / Ketone: x  / Bili: x / Urobili: x   Blood: x / Protein: x / Nitrite: x   Leuk Esterase: x / RBC: x / WBC x   Sq Epi: x / Non Sq Epi: x / Bacteria: x          Culture - Blood (collected 06-12-25 @ 18:07)  Source: Blood Blood  Preliminary Report (06-13-25 @ 22:02):    No growth at 24 hours    Culture - Blood (collected 06-12-25 @ 17:52)  Source: Blood Blood  Preliminary Report (06-13-25 @ 22:02):    No growth at 24 hours    Urinalysis with Rflx Culture (collected 06-10-25 @ 09:50)    Urinalysis with Rflx Culture (collected 06-10-25 @ 08:00)    Culture - Urine (collected 06-10-25 @ 08:00)  Source: Urine  Preliminary Report (06-11-25 @ 16:53):    >100,000 CFU/ml Gram Negative Rods        Medications:   acetaminophen     Tablet .. 650 milliGRAM(s) Oral every 6 hours PRN  bictegravir 50 mG/emtricitabine 200 mG/tenofovir alafenamide 25 mG (BIKTARVY) 1 Tablet(s) Oral daily  bisacodyl 5 milliGRAM(s) Oral every 12 hours  buPROPion XL (24-Hour) . 150 milliGRAM(s) Oral daily  cefepime   IVPB      cefepime   IVPB 2000 milliGRAM(s) IV Intermittent every 12 hours  cholecalciferol 2000 Unit(s) Oral daily  clonazePAM  Tablet 1 milliGRAM(s) Oral two times a day PRN  diltiazem    milliGRAM(s) Oral daily  ezetimibe 10 milliGRAM(s) Oral at bedtime  HYDROmorphone  Injectable 0.5 milliGRAM(s) IV Push every 4 hours PRN  melatonin 3 milliGRAM(s) Oral at bedtime PRN  metoprolol tartrate 50 milliGRAM(s) Oral every 12 hours  multivitamin 1 Tablet(s) Oral daily  oxybutynin 5 milliGRAM(s) Oral three times a day PRN  oxyCODONE    IR 5 milliGRAM(s) Oral every 4 hours PRN  polyethylene glycol 3350 17 Gram(s) Oral two times a day  potassium phosphate / sodium phosphate Tablet (K-PHOS No. 2) 2 Tablet(s) Oral every 6 hours  senna 2 Tablet(s) Oral at bedtime  sodium chloride 0.9%. 1000 milliLiter(s) IV Continuous <Continuous>  traZODone 50 milliGRAM(s) Oral at bedtime  zolpidem 5 milliGRAM(s) Oral at bedtime PRN  zolpidem 5 milliGRAM(s) Oral at bedtime PRN      RADIOLOGY, EKG & ADDITIONAL TESTS: Reviewed.

## 2025-06-14 NOTE — PROGRESS NOTE ADULT - PROBLEM SELECTOR PLAN 3
On diltiazem 240mg and metoprolol [id number] at home. Follows with cardiology but has no documented tachyarrhythmia history. Not septic during this admission.    - Continue home diltiazem 240 mg  - Continue home BB as Lopressor 50mg BID  - inpatient BP goals: SBP <140 (given hematuria)

## 2025-06-14 NOTE — PROGRESS NOTE ADULT - ASSESSMENT
58 yo M w/ PMHx of HIV on Biktarvy, HTN, prostate cancer and anal cancer s/p chemotherapy/radiation c/b b/l nephrostomy tubes (exchanged on 5/9 by IR) and ostomy presents for a few days of bilateral lower back/flank pain and bloody urine/passage of clots since 6 PM 6/9/25. Urology consulted for hematuria and bilateral flank pain. Patient is AVSS, WBC 8.3, Hct 36.6, Cr 0.8. UA positive. CTAP demonstrates bilateral nephroureteral tubes in appropriate position with no hydronephrosis bilaterally. Bladder is decompressed and prostate is unremarkable.     Hematuria can sometimes occur in setting of UTI and can be intermittent for as long as nephroureteral stents are in place. Cola colored urine will persist as old clot is lysed in the urine. Bilateral flank pain and burning with urination may be 2/2 cystitis/ ascending UTI and may improve w antibiotics. No intervention currently indicated as patient continues to void appropriately with decompressed bladder, normal H/H, normal Cr, and no hydronephrosis bilaterally    6/13: RBUS shows bladder with clot  6/14: Hct stable 27 from 30, Cr 1 from 1.1, comfortable, NU's draining maroon    Recommendations:  - Suspect that etiology of hematuria from PCNU's is from radiation cystitis and hematuria refluxing up the PCNU's.   - Attempted to place davis catheter but unsuccessful likely from h/x of radiation and stricture disease  - Recommend conversion of PCNU's to PCN's to prevent refluxing up the PCNU's and to allow bladder to clot off. Discussed with patient as probable next step, patient in agreement  - Ditropan for PRN bladder spasms    D/w Dr. Magno Kiran [facility]  [address] [place] [id number] [phone]

## 2025-06-15 LAB
ANION GAP SERPL CALC-SCNC: 12 MMOL/L — SIGNIFICANT CHANGE UP (ref 7–14)
BLD GP AB SCN SERPL QL: NEGATIVE — SIGNIFICANT CHANGE UP
BUN SERPL-MCNC: 24 MG/DL — HIGH (ref 7–23)
CALCIUM SERPL-MCNC: 8.8 MG/DL — SIGNIFICANT CHANGE UP (ref 8.4–10.5)
CHLORIDE SERPL-SCNC: 104 MMOL/L — SIGNIFICANT CHANGE UP (ref 98–107)
CO2 SERPL-SCNC: 23 MMOL/L — SIGNIFICANT CHANGE UP (ref 22–31)
CREAT SERPL-MCNC: 0.98 MG/DL — SIGNIFICANT CHANGE UP (ref 0.5–1.3)
EGFR: 89 ML/MIN/1.73M2 — SIGNIFICANT CHANGE UP
EGFR: 89 ML/MIN/1.73M2 — SIGNIFICANT CHANGE UP
GLUCOSE SERPL-MCNC: 111 MG/DL — HIGH (ref 70–99)
HCT VFR BLD CALC: 24.5 % — LOW (ref 39–50)
HGB BLD-MCNC: 8.1 G/DL — LOW (ref 13–17)
MAGNESIUM SERPL-MCNC: 2.1 MG/DL — SIGNIFICANT CHANGE UP (ref 1.6–2.6)
MCHC RBC-ENTMCNC: 29.7 PG — SIGNIFICANT CHANGE UP (ref 27–34)
MCHC RBC-ENTMCNC: 33.1 G/DL — SIGNIFICANT CHANGE UP (ref 32–36)
MCV RBC AUTO: 89.7 FL — SIGNIFICANT CHANGE UP (ref 80–100)
NRBC # BLD AUTO: 0.02 K/UL — HIGH (ref 0–0)
NRBC # FLD: 0.02 K/UL — HIGH (ref 0–0)
NRBC BLD AUTO-RTO: 0 /100 WBCS — SIGNIFICANT CHANGE UP (ref 0–0)
PHOSPHATE SERPL-MCNC: 2.8 MG/DL — SIGNIFICANT CHANGE UP (ref 2.5–4.5)
PLATELET # BLD AUTO: 310 K/UL — SIGNIFICANT CHANGE UP (ref 150–400)
POTASSIUM SERPL-MCNC: 3.6 MMOL/L — SIGNIFICANT CHANGE UP (ref 3.5–5.3)
POTASSIUM SERPL-SCNC: 3.6 MMOL/L — SIGNIFICANT CHANGE UP (ref 3.5–5.3)
RBC # BLD: 2.73 M/UL — LOW (ref 4.2–5.8)
RBC # FLD: 14.4 % — SIGNIFICANT CHANGE UP (ref 10.3–14.5)
RH IG SCN BLD-IMP: NEGATIVE — SIGNIFICANT CHANGE UP
SODIUM SERPL-SCNC: 139 MMOL/L — SIGNIFICANT CHANGE UP (ref 135–145)
WBC # BLD: 6.63 K/UL — SIGNIFICANT CHANGE UP (ref 3.8–10.5)
WBC # FLD AUTO: 6.63 K/UL — SIGNIFICANT CHANGE UP (ref 3.8–10.5)

## 2025-06-15 PROCEDURE — 99232 SBSQ HOSP IP/OBS MODERATE 35: CPT | Mod: GC

## 2025-06-15 PROCEDURE — 99232 SBSQ HOSP IP/OBS MODERATE 35: CPT

## 2025-06-15 RX ORDER — METOPROLOL SUCCINATE 50 MG/1
50 TABLET, EXTENDED RELEASE ORAL EVERY 12 HOURS
Refills: 0 | Status: DISCONTINUED | OUTPATIENT
Start: 2025-06-15 | End: 2025-06-16

## 2025-06-15 RX ORDER — HYDROMORPHONE/SOD CHLOR,ISO/PF 2 MG/10 ML
0.5 SYRINGE (ML) INJECTION ONCE
Refills: 0 | Status: DISCONTINUED | OUTPATIENT
Start: 2025-06-15 | End: 2025-06-15

## 2025-06-15 RX ADMIN — Medication 0.5 MILLIGRAM(S): at 21:54

## 2025-06-15 RX ADMIN — Medication 50 MILLIGRAM(S): at 21:47

## 2025-06-15 RX ADMIN — BICTEGRAVIR SODIUM, EMTRICITABINE, AND TENOFOVIR ALAFENAMIDE FUMARATE 1 TABLET(S): 50; 200; 25 TABLET ORAL at 11:38

## 2025-06-15 RX ADMIN — Medication 0.5 MILLIGRAM(S): at 22:54

## 2025-06-15 RX ADMIN — CEFEPIME 100 MILLIGRAM(S): 2 INJECTION, POWDER, FOR SOLUTION INTRAVENOUS at 05:41

## 2025-06-15 RX ADMIN — Medication 5 MILLIGRAM(S): at 21:47

## 2025-06-15 RX ADMIN — Medication 0.5 MILLIGRAM(S): at 16:38

## 2025-06-15 RX ADMIN — BUPROPION HYDROBROMIDE 150 MILLIGRAM(S): 522 TABLET, EXTENDED RELEASE ORAL at 11:38

## 2025-06-15 RX ADMIN — Medication 0.5 MILLIGRAM(S): at 11:45

## 2025-06-15 RX ADMIN — Medication 2 TABLET(S): at 05:42

## 2025-06-15 RX ADMIN — DILTIAZEM HYDROCHLORIDE 240 MILLIGRAM(S): 240 TABLET, EXTENDED RELEASE ORAL at 05:41

## 2025-06-15 RX ADMIN — Medication 0.5 MILLIGRAM(S): at 09:14

## 2025-06-15 RX ADMIN — Medication 1 TABLET(S): at 11:37

## 2025-06-15 RX ADMIN — Medication 2000 UNIT(S): at 11:38

## 2025-06-15 RX ADMIN — Medication 2 TABLET(S): at 00:10

## 2025-06-15 RX ADMIN — Medication 0.5 MILLIGRAM(S): at 08:59

## 2025-06-15 RX ADMIN — Medication 5 MILLIGRAM(S): at 18:23

## 2025-06-15 RX ADMIN — EZETIMIBE 10 MILLIGRAM(S): 10 TABLET ORAL at 21:47

## 2025-06-15 RX ADMIN — Medication 0.5 MILLIGRAM(S): at 12:00

## 2025-06-15 RX ADMIN — METOPROLOL SUCCINATE 50 MILLIGRAM(S): 50 TABLET, EXTENDED RELEASE ORAL at 18:23

## 2025-06-15 RX ADMIN — Medication 0.5 MILLIGRAM(S): at 05:40

## 2025-06-15 RX ADMIN — POLYETHYLENE GLYCOL 3350 17 GRAM(S): 17 POWDER, FOR SOLUTION ORAL at 18:23

## 2025-06-15 RX ADMIN — Medication 5 MILLIGRAM(S): at 05:42

## 2025-06-15 RX ADMIN — Medication 0.5 MILLIGRAM(S): at 06:38

## 2025-06-15 RX ADMIN — CEFEPIME 100 MILLIGRAM(S): 2 INJECTION, POWDER, FOR SOLUTION INTRAVENOUS at 18:23

## 2025-06-15 RX ADMIN — Medication 0.5 MILLIGRAM(S): at 16:23

## 2025-06-15 NOTE — CHART NOTE - NSCHARTNOTEFT_GEN_A_CORE
PRE-INTERVENTIONAL RADIOLOGY PROCEDURE NOTE      Patient Age: 59y    Patient Gender: Male    Procedure: Bilateral PCNU to PCN tube Conversion    Diagnosis/Indication: Bladder clots and hematuria    Interventional Radiology Attending Physician: Dr. Pagan    Ordering Attending Physician: Maximo Wong    Pertinent labs:                      9.0    8.78  )-----------( 307      ( 14 Jun 2025 17:57 )             27.2       06-14    139  |  104  |  26[H]  ----------------------------<  122[H]  3.9   |  25  |  1.00    Ca    9.2      14 Jun 2025 06:58  Phos  2.3     06-14  Mg     2.30     06-14        PT/INR - ( 14 Jun 2025 06:58 )   PT: 13.4 sec;   INR: 1.13 ratio         PTT - ( 14 Jun 2025 06:58 )  PTT:25.7 sec        Patient and Family Aware ? Yes

## 2025-06-15 NOTE — DISCHARGE NOTE PROVIDER - NSDCMRMEDTOKEN_GEN_ALL_CORE_FT
bictegravir/emtricitabine/tenofovir 50 mg-200 mg-25 mg oral tablet: 1 tab(s) orally once a day  buPROPion 150 mg/24 hours (XL) oral tablet, extended release: 1 tab(s) orally once a day  cholecalciferol 50 mcg (2000 intl units) oral capsule: 1 cap(s) orally once a day  clonazePAM 1 mg oral tablet: 1 tab(s) orally 2 times a day as needed for  anxiety  ezetimibe 10 mg oral tablet: 1 tab(s) orally once a day (at bedtime)  metoprolol succinate 100 mg oral capsule, extended release: 1 cap(s) orally once a day am  Multiple Vitamins oral tablet: 1 tab(s) orally once a day  polyethylene glycol 3350 oral powder for reconstitution: 17 gram(s) orally once a day as needed for  constipation  senna leaf extract oral tablet: 2 tab(s) orally once a day as needed for  constipation  Tiadylt  mg/24 hours oral capsule, extended release: 1 cap(s) orally once a day am  traZODone 50 mg oral tablet: 1 tab(s) orally once a day (at bedtime)  zolpidem 10 mg oral tablet: 1 tab(s) orally once a day (at bedtime)   bictegravir/emtricitabine/tenofovir 50 mg-200 mg-25 mg oral tablet: 1 tab(s) orally once a day  buPROPion 150 mg/24 hours (XL) oral tablet, extended release: 1 tab(s) orally once a day  Check CBC, BUN, Cr, LFTs, weekly while on IV antibiotics: Please send results to [email or web address]  cholecalciferol 50 mcg (2000 intl units) oral capsule: 1 cap(s) orally once a day  clonazePAM 1 mg oral tablet: 1 tab(s) orally 2 times a day as needed for  anxiety  Dilaudid 2 mg oral tablet: 1 tab(s) orally every 6 hours As needed Severe Pain (7 - 10)  ezetimibe 10 mg oral tablet: 1 tab(s) orally once a day (at bedtime)  metoprolol succinate 100 mg oral capsule, extended release: 1 cap(s) orally once a day am  Multiple Vitamins oral tablet: 1 tab(s) orally once a day  oxyBUTYnin 5 mg oral tablet: 1 tab(s) orally every 8 hours  penicillin G potassium 1,000,000 units/50 mL intravenous solution: 4 million unit(s) intravenously every 4 hours  polyethylene glycol 3350 oral powder for reconstitution: 17 gram(s) orally once a day as needed for  constipation  senna leaf extract oral tablet: 2 tab(s) orally once a day as needed for  constipation  Tiadylt  mg/24 hours oral capsule, extended release: 1 cap(s) orally once a day am  traZODone 50 mg oral tablet: 1 tab(s) orally once a day (at bedtime)  zolpidem 10 mg oral tablet: 1 tab(s) orally once a day (at bedtime)

## 2025-06-15 NOTE — DISCHARGE NOTE PROVIDER - CARE PROVIDERS DIRECT ADDRESSES
[email or web address],[email or web address],[email or web address]

## 2025-06-15 NOTE — DISCHARGE NOTE PROVIDER - NSDCFUADDAPPT_GEN_ALL_CORE_FT
APPTS ARE READY TO BE MADE: [ ] YES    Best Family or Patient Contact (if needed):    Additional Information about above appointments (if needed):    1: PCP  2: Urology  3: Infectious disease  4: Oncology    Other comments or requests:    APPTS ARE READY TO BE MADE: [x] YES    Best Family or Patient Contact (if needed):    Additional Information about above appointments (if needed):    1: PCP  2: Urology  3: Infectious disease  4: Oncology    Other comments or requests:    APPTS ARE READY TO BE MADE: [x] YES    Best Family or Patient Contact (if needed):    Additional Information about above appointments (if needed):    1: PCP  2: Urology  3: Infectious disease  4: Oncology  5: interventional radiology in 3 months for tube exchange    Other comments or requests:    APPTS ARE READY TO BE MADE: [x] YES    Best Family or Patient Contact (if needed):    Additional Information about above appointments (if needed):    1: PCP  2: Urology  3: Infectious disease  4: Oncology  5: interventional radiology in 3 months for tube exchange    Other comments or requests:     Patient advised they did not want to proceed with scheduling appointments with the providers on their referrals. They will coordinate care on their own since he is established with all specialties already.

## 2025-06-15 NOTE — PROGRESS NOTE ADULT - PROBLEM SELECTOR PLAN 2
Dark colored in bilateral nephrostomy tubes. 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 Increased hematuria on 6/11 in R bag with clots. Per Urology nikia due to friable  tissue. Confirmed clot in bladder on 6/13 KUB US.    - IR consulted to switch tubes to nephrostomy tubes to reduce bladder irritation and allow bladder bleeding to tamponade off  - Trend CBC BID  - if H&H drops -> urgent CTA A&P arterial/venous phase eval for bleed and call IR   - Maintain active T&S  - transfuse PRN hgb <7 or for large Hgb drop  - BP regulation (SBP < 140) Dark colored in bilateral nephrostomy tubes. 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 Increased hematuria on 6/11 in R bag with clots. Per Urology nikia due to friable  tissue. Confirmed clot in bladder on 6/13 KUB US.    - IR plan to convert nephroureterostomy tubes to nephrostomy tubes on 6/16 to reduce bladder irritation and allow bladder bleeding to tamponade off  - transfuse 1 unit pRBC on 6/15 given slow active bleed  - if H&H drops suddenly -> urgent CTA A&P arterial/venous phase eval for bleed and call IR   - Maintain active T&S  - transfuse PRN hgb <7 or for active bleeding  - BP regulation (SBP < 140)

## 2025-06-15 NOTE — DISCHARGE NOTE PROVIDER - DETAILS OF MALNUTRITION DIAGNOSIS/DIAGNOSES
This patient has been assessed with a concern for Malnutrition and was treated during this hospitalization for the following Nutrition diagnosis/diagnoses:     -  06/18/2025: Moderate protein-calorie malnutrition

## 2025-06-15 NOTE — DISCHARGE NOTE PROVIDER - NSDCCPCAREPLAN_GEN_ALL_CORE_FT
PRINCIPAL DISCHARGE DIAGNOSIS  Diagnosis: Acute UTI  Assessment and Plan of Treatment: You were experiencing a urinary tract infection (UTI) that caused some blood in your urine (hematuria). You've received intravenous antibiotics to treat the infection as advised by the infectious disease specialist.  You should be feeling better soon, but it's important to finish all your prescribed medication.  Return to the hospital if you experience a high fever (over 101°F or 38.3°C), severe pain in your lower back or abdomen, chills, vomiting, or if the blood in your urine increases or doesn't go away.  Also, come back if your symptoms don't improve within a few days of finishing your antibiotics or if they worsen at any point.        SECONDARY DISCHARGE DIAGNOSES  Diagnosis: Hematuria  Assessment and Plan of Treatment: You experienced bleeding in your urine (hematuria) due to a urinary tract infection (UTI) and the effects of previous radiation, which made the tissues in your urinary system more fragile. To help stop the bleeding and allow clots to form in your bladder, the interventional radiology team switched your ureteral stents (small tubes) to nephrostomy tubes.  These new tubes drain urine directly from your kidneys into a bag outside your body, bypassing your bladder entirely. You also received a blood transfusion. It's very important to schedule a follow-up appointment with your urologist soon to discuss your recovery and next steps.  Return to the hospital immediately if you experience heavy bleeding, severe pain, fever, chills, or if your urine output decreases significantly or stops.     PRINCIPAL DISCHARGE DIAGNOSIS  Diagnosis: Acute UTI  Assessment and Plan of Treatment: You were experiencing a urinary tract infection (UTI) that caused some blood in your urine (hematuria). You've received intravenous antibiotics to treat the infection as advised by the infectious disease specialist.  You should be feeling better soon, but it's important to finish all your prescribed medication.  Return to the hospital if you experience a high fever (over 101°F or 38.3°C), severe pain in your lower back or abdomen, chills, vomiting, or if the blood in your urine increases or doesn't go away.  Also, come back if your symptoms don't improve within a few days of finishing your antibiotics or if they worsen at any point.  Please follow up with Interventional Radiology in 2 months to get the tube exchange once more. Additionally flush the tubes with 10 ml normal saline once daily to prevent clotting.         SECONDARY DISCHARGE DIAGNOSES  Diagnosis: Hematuria  Assessment and Plan of Treatment: You experienced bleeding in your urine (hematuria) due to a urinary tract infection (UTI) and the effects of previous radiation, which made the tissues in your urinary system more fragile. To help stop the bleeding and allow clots to form in your bladder, the interventional radiology team switched your ureteral stents (small tubes) to nephrostomy tubes.  These new tubes drain urine directly from your kidneys into a bag outside your body, bypassing your bladder entirely. You also received a blood transfusion. It's very important to schedule a follow-up appointment with your urologist soon to discuss your recovery and next steps.  Return to the hospital immediately if you experience heavy bleeding, severe pain, fever, chills, or if your urine output decreases significantly or stops.  Please follow up with Interventional Radiology in 2 months to get the tube exchange once more. Additionally flush the tubes with 10 ml normal saline once daily to prevent clotting.     PRINCIPAL DISCHARGE DIAGNOSIS  Diagnosis: Acute UTI  Assessment and Plan of Treatment: You were experiencing a urinary tract infection (UTI) that caused some blood in your urine (hematuria). You've received intravenous antibiotics to treat the infection as advised by the infectious disease specialist.  You should be feeling better soon, but it's important to finish all your prescribed medication.  Return to the hospital if you experience a high fever (over 101°F or 38.3°C), severe pain in your lower back or abdomen, chills, vomiting, or if the blood in your urine increases or doesn't go away.  Also, come back if your symptoms don't improve within a few days of finishing your antibiotics or if they worsen at any point.  Please follow up with Interventional Radiology in 2 months to get the tube exchange once more. Additionally flush the tubes with 10 ml normal saline once daily to prevent clotting.         SECONDARY DISCHARGE DIAGNOSES  Diagnosis: Hematuria  Assessment and Plan of Treatment: You experienced bleeding in your urine (hematuria) due to a urinary tract infection (UTI) and the effects of previous radiation, which made the tissues in your urinary system more fragile. To help stop the bleeding and allow clots to form in your bladder, the interventional radiology team switched your ureteral stents (small tubes) to nephrostomy tubes.  These new tubes drain urine directly from your kidneys into a bag outside your body, bypassing your bladder entirely. You also received a blood transfusion. It's very important to schedule a follow-up appointment with your urologist soon to discuss your recovery and next steps.  Return to the hospital immediately if you experience heavy bleeding, severe pain, fever, chills, or if your urine output decreases significantly or stops.  Please follow up with Interventional Radiology in 2 months to get the tube exchange once more. Additionally flush the tubes with 10 ml normal saline once daily to prevent clotting.    Diagnosis: Secondary syphilis  Assessment and Plan of Treatment: You will need to complete the IV penicillin through your PICC line which was placed 6/24/25 every 4 hours until 7/4/25. Home antibiotic infusion was approved. Please followup with infectious disease doctors.

## 2025-06-15 NOTE — DISCHARGE NOTE PROVIDER - NSDCCPTREATMENT_GEN_ALL_CORE_FT
PRINCIPAL PROCEDURE  Procedure: CT abdomen w or w/o contrast  Findings and Treatment:   < end of copied text >  FINDINGS:  LOWER CHEST: Mild bibasilar linear atelectasis versus scarring.  LIVER: Within normal limits.  BILE DUCTS: Normal caliber.  GALLBLADDER: Within normal limits.  SPLEEN: Within normal limits.  PANCREAS: Within normal limits.  ADRENALS: Nonspecific left adrenal thickening  KIDNEYS/URETERS: Bilateral percutaneous nephroureteral tubes with distal   tips coiled in the bladder. No hydronephrosis. Bilateral renal cysts the   largest of which measuring up to 6.5 cm in the right upper pole and 5.9 cm  BLADDER: Decompressed  REPRODUCTIVE ORGANS: Prostate within normal limits.  BOWEL: Status post partial left hemicolectomy with ostomy/stoma site in   the left periumbilical region. Appendix is normal.  PERITONEUM/RETROPERITONEUM: Within normal limits.  VESSELS: IVC filter. Mild atherosclerotic plaques.  LYMPH NODES: No lymphadenopathy.  ABDOMINAL WALL: Left periumbilical stoma site, as stated above.   Postsurgical changes. Lipomatous flap overlying the pelvic/gluteal cleft..  BONES: Mild degenerative changes.  IMPRESSION:  Bilateral percutaneous nephroureteral tubes with distal tips coiled in   the bladder. No hydronephrosis.< from: CT Abdomen and Pelvis w/ IV Cont (06.10.25 @ 06:27) >        SECONDARY PROCEDURE  Procedure: US kidney and bladder  Findings and Treatment:   < end of copied text >  FINDINGS:  Right kidney: 13.6 cm. No renal mass or calculi. Upper pole cyst 6-7 cm.   Mild hydronephrosis similar to 06/10/2025 with possible hemorrhagic fluid  Left kidney: 14.5 cm. No renal mass, hydronephrosis or calculi. Several   cysts, largest 5.5 and 6 cm  Urinary bladder: Likely filled with clot  IMPRESSION:  Mild right hydronephrosis with possible hemorrhagic fluid.  Urinary bladder likely filled with clot.< from: US Kidney and Bladder (06.13.25 @ 16:17) >

## 2025-06-15 NOTE — PROGRESS NOTE ADULT - ASSESSMENT
60 yo M w/ PMHx of HIV on Biktarvy, HTN, prostate cancer and anal cancer s/p chemotherapy/radiation c/b b/l nephrostomy tubes (exchanged on 5/9 by IR) and ostomy presents for a few days of bilateral lower back/flank pain and bloody urine/passage of clots since 6 PM 6/9/25. Urology consulted for hematuria and bilateral flank pain. Patient is AVSS, WBC 8.3, Hct 36.6, Cr 0.8. UA positive. CTAP demonstrates bilateral nephroureteral tubes in appropriate position with no hydronephrosis bilaterally. Bladder is decompressed and prostate is unremarkable.     Hematuria can sometimes occur in setting of UTI and can be intermittent for as long as nephroureteral stents are in place. Cola colored urine will persist as old clot is lysed in the urine. Bilateral flank pain and burning with urination may be 2/2 cystitis/ ascending UTI and may improve w antibiotics. No intervention currently indicated as patient continues to void appropriately with decompressed bladder, normal H/H, normal Cr, and no hydronephrosis bilaterally    6/13: RBUS shows bladder with clot  6/14: Hct stable 27 from 30, Cr 1 from 1.1, comfortable, NU's draining maroon  6/15:     Recommendations:  - Suspect that etiology of hematuria from PCNU's is from radiation cystitis and hematuria refluxing up the PCNU's   - Sahu placement unsuccessful likely from h/x of radiation and stricture disease  - Recommend conversion of PCNU's to PCN's to prevent refluxing up the PCNU's and to allow bladder to clot off - pt amenable  - Ditropan for PRN bladder spasms    D/w Dr. Kiran   60 yo M w/ PMHx of HIV on Biktarvy, HTN, prostate cancer and anal cancer s/p chemotherapy/radiation c/b b/l nephrostomy tubes (exchanged on 5/9 by IR) and ostomy presents for a few days of bilateral lower back/flank pain and bloody urine/passage of clots since 6 PM 6/9/25. Urology consulted for hematuria and bilateral flank pain. Patient is AVSS, WBC 8.3, Hct 36.6, Cr 0.8. UA positive. CTAP demonstrates bilateral nephroureteral tubes in appropriate position with no hydronephrosis bilaterally. Bladder is decompressed and prostate is unremarkable.     Hematuria can sometimes occur in setting of UTI and can be intermittent for as long as nephroureteral stents are in place. Cola colored urine will persist as old clot is lysed in the urine. Bilateral flank pain and burning with urination may be 2/2 cystitis/ ascending UTI and may improve w antibiotics. No intervention currently indicated as patient continues to void appropriately with decompressed bladder, normal H/H, normal Cr, and no hydronephrosis bilaterally    6/13: RBUS shows bladder with clot  6/14: Hct stable 27 from 30, Cr 1 from 1.1, comfortable, NU's draining maroon  6/15: comfortably, NU's draining maroon/brown. 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 IR plans to convert PCNU's to PCNs under local on Monday 6/16  - Ditropan for PRN bladder spasms    D/w Dr. Kiran   60 yo M w/ PMHx of HIV on Biktarvy, HTN, prostate cancer and anal cancer s/p chemotherapy/radiation c/b b/l nephrostomy tubes (exchanged on 5/9 by IR) and ostomy presents for a few days of bilateral lower back/flank pain and bloody urine/passage of clots since 6 PM 6/9/25. Urology consulted for hematuria and bilateral flank pain. Patient is AVSS, WBC 8.3, Hct 36.6, Cr 0.8. UA positive. CTAP demonstrates bilateral nephroureteral tubes in appropriate position with no hydronephrosis bilaterally. Bladder is decompressed and prostate is unremarkable.     Hematuria can sometimes occur in setting of UTI and can be intermittent for as long as nephroureteral stents are in place. Cola colored urine will persist as old clot is lysed in the urine. Bilateral flank pain and burning with urination may be 2/2 cystitis/ ascending UTI and may improve w antibiotics. No intervention currently indicated as patient continues to void appropriately with decompressed bladder, normal H/H, normal Cr, and no hydronephrosis bilaterally    6/13: RBUS shows bladder with clot  6/14: Hct stable 27 from 30, Cr 1 from 1.1, comfortable, NU's draining maroon  6/15: comfortably, NU's draining maroon/brown. 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 IR plans to convert PCNU's to PCNs under local on Monday 6/16  - Ditropan for PRN bladder spasms    D/w Dr. Wise  Urology  [id number]

## 2025-06-15 NOTE — PROGRESS NOTE ADULT - ASSESSMENT
59M w/ hx of prostate + anal cancer (s/p chemotherapy + radiation in 2020 c/b bilateral nephrostomy tubes and ostomy), HIV on Biktarvy, HTN, HLD, anxiety/depression presenting for bilateral flank pain and hematuria found to have positive UA and normal functioning nephrostomy tubes per urology suggestive of UTI. Transitioned from Zosyn to cefepime per ID recommendations given history of pseudomonas. Course complicated by port wine colored hematuria into bilateral L>R nephrourterostomy bags. Originally planned for CBI by urology but unable to place davis due to strictures. KUB ultrasound showing clots in bladder so urology recommending IR exchange nephroureterostomy tubes with standard nephrostomy tubes to allow likely bleed to tamponade.   59M w/ hx of prostate + anal cancer (s/p chemotherapy + radiation in 2020 c/b bilateral nephrostomy tubes and ostomy), HIV on Biktarvy, HTN, HLD, anxiety/depression presenting for bilateral flank pain and hematuria found to have positive UA and normal functioning nephrostomy tubes per urology suggestive of UTI. Transitioned from Zosyn to cefepime per ID recommendations given history of pseudomonas. Course complicated by port wine colored hematuria into bilateral L>R nephrourterostomy bags. Originally planned for CBI by urology but unable to place davis due to strictures. KUB ultrasound showing clots in bladder so urology recommending IR exchange nephroureterostomy tubes on  with standard nephrostomy tubes to allow likely bleed to tamponade.

## 2025-06-15 NOTE — DISCHARGE NOTE PROVIDER - NSDCFUSCHEDAPPT_GEN_ALL_CORE_FT
[facility]  DERM [address]  Scheduled Appointment: 06/18/2025    Adonis Layne  [facility]  CARDIOLOGY [address]  Scheduled Appointment: 07/07/2025    Marii Becerra  [facility]  INFDISEASE [address]  Scheduled Appointment: 07/10/2025     Adonis Layne  [facility]  CARDIOLOGY [address]  Scheduled Appointment: 07/07/2025    Marii Becerra  [facility]  [address]ISEASE [address]  Scheduled Appointment: 07/10/2025

## 2025-06-15 NOTE — DISCHARGE NOTE PROVIDER - PROVIDER TOKENS
PROVIDER:[TOKEN:[[id number]],FOLLOWUP:[1 week],ESTABLISHEDPATIENT:[T]],PROVIDER:[[id number]],FOLLOWUP:[Routine],ESTABLISHEDPATIENT:[T]],PROVIDER:[TOKEN:[[id number]],FOLLOWUP:[2 weeks],ESTABLISHEDPATIENT:[T]] PROVIDER:[TOKEN:[[id number]],FOLLOWUP:[1 week],ESTABLISHEDPATIENT:[T]],PROVIDER:[TOKEN:[[id number]],FOLLOWUP:[Routine],ESTABLISHEDPATIENT:[T]],PROVIDER:[TOKEN:[[id number]],FOLLOWUP:[2 weeks],ESTABLISHEDPATIENT:[T]],PROVIDER:[TOKEN:[[id number]]]

## 2025-06-15 NOTE — PROGRESS NOTE ADULT - ATTENDING COMMENTS
59M w/ hx of prostate + anal cancer (s/p chemotherapy + radiation in 2020 c/b bilateral nephrostomy tubes and ostomy), HIV on Biktarvy, HTN, HLD, anxiety/depression presenting for bilateral flank pain and hematuria found to have positive UA and hematuria on PCUN.     #Sepsis 2/2 pyelonephritis. ID cs, cont cefepime. fu urine culture >100K GNR. BCX Neg.  #Hematuria: urology consulted, recommends pcun to pcn conversion. IR consulted, plans for 6/16.  #hx of secondary syphilis, prior treatment with IM Bicillin (multiple treatment), fu with ID re RPR +. plans for likely IV PCN  # HTN c/w Cardizem, lopressor  # Dvt ppx: scds    rest as above

## 2025-06-15 NOTE — PROGRESS NOTE ADULT - ATTENDING COMMENTS
Agree with above  Hgb 8.1. Cr 1  Urine still dark red/maroon  IR to exchange bilateral tubes tomorrow

## 2025-06-15 NOTE — PROGRESS NOTE ADULT - SUBJECTIVE AND OBJECTIVE BOX
UROLOGY PROGRESS NOTE    24 HOUR/OVERNIGHT EVENTS:    SUBJECTIVE:  Patient seen and examined at bedside. Tolerating diet without n/v. Denies fevers, chills, SOB, CP.      OBJECTIVE:    Vital Signs:  T(F): , Max: 98.1 (06-14-25 @ 20:37)  HR: 112 (06-15-25 @ 05:18)  BP: 109/84 (06-15-25 @ 05:18)  SpO2: 98% (06-15-25 @ 05:18)  Wt(kg): --    Output:     OUT:    Nephrostomy Tube (mL): 750 mL    Nephrostomy Tube (mL): 290 mL  Total OUT: 1040 mL    Total NET: -1040 mL      OUT:    Nephrostomy Tube (mL): 250 mL    Nephrostomy Tube (mL): 200 mL  Total OUT: 450 mL    Total NET: -450 mL          Physical Exam:  Gen: NAD  Abd: Soft, nondistended  : Bilateral NUs draining maroon-colored urine    Medications  MEDICATIONS  (STANDING):  bictegravir 50 mG/emtricitabine 200 mG/tenofovir alafenamide 25 mG (BIKTARVY) 1 Tablet(s) Oral daily  bisacodyl 5 milliGRAM(s) Oral every 12 hours  buPROPion XL (24-Hour) . 150 milliGRAM(s) Oral daily  cefepime   IVPB      cefepime   IVPB 2000 milliGRAM(s) IV Intermittent every 12 hours  cholecalciferol 2000 Unit(s) Oral daily  diltiazem    milliGRAM(s) Oral daily  ezetimibe 10 milliGRAM(s) Oral at bedtime  metoprolol tartrate 50 milliGRAM(s) Oral every 12 hours  multivitamin 1 Tablet(s) Oral daily  polyethylene glycol 3350 17 Gram(s) Oral two times a day  sodium chloride 0.9%. 1000 milliLiter(s) (100 mL/Hr) IV Continuous <Continuous>  traZODone 50 milliGRAM(s) Oral at bedtime    MEDICATIONS  (PRN):  acetaminophen     Tablet .. 650 milliGRAM(s) Oral every 6 hours PRN Temp greater or equal to 38C (100.4F), Mild Pain (1 - 3)  clonazePAM  Tablet 1 milliGRAM(s) Oral two times a day PRN for anxiety  HYDROmorphone  Injectable 0.5 milliGRAM(s) IV Push every 4 hours PRN Severe Pain (7 - 10)  melatonin 3 milliGRAM(s) Oral at bedtime PRN Insomnia  oxybutynin 5 milliGRAM(s) Oral three times a day PRN Bladder spasms  oxyCODONE    IR 5 milliGRAM(s) Oral every 4 hours PRN Moderate Pain (4 - 6)  zolpidem 5 milliGRAM(s) Oral at bedtime PRN Insomnia  zolpidem 5 milliGRAM(s) Oral at bedtime PRN Insomnia      LABS:      06-14 @ 17:57    WBC 8.78  / Hct 27.2  / SCr --       06-14 @ 06:58    WBC 7.84  / Hct 26.7  / SCr 1.00         Culture - Blood (collected 06-12-25 @ 18:07)  Source: Blood Blood  Preliminary Report (06-14-25 @ 22:02):    No growth at 48 Hours    Culture - Blood (collected 06-12-25 @ 17:52)  Source: Blood Blood  Preliminary Report (06-14-25 @ 22:02):    No growth at 48 Hours    Urinalysis with Rflx Culture (collected 06-10-25 @ 09:50)    Urinalysis with Rflx Culture (collected 06-10-25 @ 08:00)    Culture - Urine (collected 06-10-25 @ 08:00)  Source: Urine  Preliminary Report (06-11-25 @ 16:53):    >100,000 CFU/ml Gram Negative Rods          RADIOLOGY:                   UROLOGY PROGRESS NOTE    24 HOUR/OVERNIGHT EVENTS:    SUBJECTIVE:  Patient seen and examined at bedside. Tolerating diet without n/v, endorses mild bladder discomfort still but overall feels better than yesterday      OBJECTIVE:    Vital Signs:  T(F): , Max: 98.1 (06-14-25 @ 20:37)  HR: 112 (06-15-25 @ 05:18)  BP: 109/84 (06-15-25 @ 05:18)  SpO2: 98% (06-15-25 @ 05:18)  Wt(kg): --    Output:     OUT:    Nephrostomy Tube (mL): 750 mL    Nephrostomy Tube (mL): 290 mL  Total OUT: 1040 mL    Total NET: -1040 mL      OUT:    Nephrostomy Tube (mL): 250 mL    Nephrostomy Tube (mL): 200 mL  Total OUT: 450 mL    Total NET: -450 mL          Physical Exam:  Gen: NAD  Abd: Soft, nondistended  : Bilateral NUs draining maroon-colored urine (left more brown than right)    Medications  MEDICATIONS  (STANDING):  bictegravir 50 mG/emtricitabine 200 mG/tenofovir alafenamide 25 mG (BIKTARVY) 1 Tablet(s) Oral daily  bisacodyl 5 milliGRAM(s) Oral every 12 hours  buPROPion XL (24-Hour) . 150 milliGRAM(s) Oral daily  cefepime   IVPB      cefepime   IVPB 2000 milliGRAM(s) IV Intermittent every 12 hours  cholecalciferol 2000 Unit(s) Oral daily  diltiazem    milliGRAM(s) Oral daily  ezetimibe 10 milliGRAM(s) Oral at bedtime  metoprolol tartrate 50 milliGRAM(s) Oral every 12 hours  multivitamin 1 Tablet(s) Oral daily  polyethylene glycol 3350 17 Gram(s) Oral two times a day  sodium chloride 0.9%. 1000 milliLiter(s) (100 mL/Hr) IV Continuous <Continuous>  traZODone 50 milliGRAM(s) Oral at bedtime    MEDICATIONS  (PRN):  acetaminophen     Tablet .. 650 milliGRAM(s) Oral every 6 hours PRN Temp greater or equal to 38C (100.4F), Mild Pain (1 - 3)  clonazePAM  Tablet 1 milliGRAM(s) Oral two times a day PRN for anxiety  HYDROmorphone  Injectable 0.5 milliGRAM(s) IV Push every 4 hours PRN Severe Pain (7 - 10)  melatonin 3 milliGRAM(s) Oral at bedtime PRN Insomnia  oxybutynin 5 milliGRAM(s) Oral three times a day PRN Bladder spasms  oxyCODONE    IR 5 milliGRAM(s) Oral every 4 hours PRN Moderate Pain (4 - 6)  zolpidem 5 milliGRAM(s) Oral at bedtime PRN Insomnia  zolpidem 5 milliGRAM(s) Oral at bedtime PRN Insomnia      LABS:      06-14 @ 17:57    WBC 8.78  / Hct 27.2  / SCr --       06-14 @ 06:58    WBC 7.84  / Hct 26.7  / SCr 1.00         Culture - Blood (collected 06-12-25 @ 18:07)  Source: Blood Blood  Preliminary Report (06-14-25 @ 22:02):    No growth at 48 Hours    Culture - Blood (collected 06-12-25 @ 17:52)  Source: Blood Blood  Preliminary Report (06-14-25 @ 22:02):    No growth at 48 Hours    Urinalysis with Rflx Culture (collected 06-10-25 @ 09:50)    Urinalysis with Rflx Culture (collected 06-10-25 @ 08:00)    Culture - Urine (collected 06-10-25 @ 08:00)  Source: Urine  Preliminary Report (06-11-25 @ 16:53):    >100,000 CFU/ml Gram Negative Rods          RADIOLOGY:

## 2025-06-15 NOTE — DISCHARGE NOTE PROVIDER - HOSPITAL COURSE
For full details, please see H&P, progress notes, consult notes and ancillary notes.    Primary Admission Diagnosis: UTI + Hematuria    Primary Discharge Diagnosis:     Hospital Course:  59M w/ hx of prostate + anal cancer (s/p chemotherapy + radiation in 2020 c/b bilateral nephrostomy tubes and ostomy), HIV on Biktarvy, HTN, HLD, anxiety/depression presenting for bilateral flank pain and hematuria found to have positive UA and normal functioning nephrostomy tubes per urology suggestive of UTI. Transitioned from Zosyn to cefepime per ID recommendations given history of pseudomonas. Given history of prior treated secondary syphillis he was tested again anf RPR was negative **********. Course complicated by port wine colored hematuria into bilateral L>R nephrourterostomy bags. Originally planned for CBI by urology but unable to place davis due to strictures. KUB ultrasound showing clots in bladder so urology recommending IR exchange nephroureterostomy tubes with standard nephrostomy tubes to allow bladder to tamponade. Was transfused************      #     #    On day of discharge, patient is clinically stable with no new exam findings or acute symptoms compared to prior. The patient was seen by the attending physician on the date of discharge and deemed stable and acceptable for discharge. The patient's chronic medical conditions were treated accordingly per the patient's home medication regimen. The patient's medication reconciliation (with changes made to chronic medications), follow up appointments, discharge orders, instructions, and significant lab and diagnostic studies are as noted.     Discharge follow up action items:     1. Follow up with: PCP, Urology, Oncology, ID  2. Follow up tasks: ******  3. Medication changes: *******  4. On hold medications:*************    Patient's ordered code status: Home  Patient disposition: Full Code    Patient will be discharged to Home with close follow up.    For full details, please see H&P, progress notes, consult notes and ancillary notes.    Primary Admission Diagnosis: UTI + Hematuria    Primary Discharge Diagnosis:     Hospital Course:  59M w/ hx of prostate + anal cancer (s/p chemotherapy + radiation in 2020 c/b bilateral nephrostomy tubes and ostomy), HIV on Biktarvy, HTN, HLD, anxiety/depression presenting for bilateral flank pain and hematuria found to have positive UA and normal functioning nephrostomy tubes per urology suggestive of UTI. Transitioned from Zosyn to cefepime per ID recommendations given history of pseudomonas. Given history of prior treated secondary syphillis he was tested again, AB+ but confirmatory test negative, though he had a recent outpt skin biopsy so decision made to treat with ***course of penicillin.     Course complicated by port wine colored hematuria into bilateral L>R nephrourterostomy bags. Originally planned for CBI by urology but unable to place davis due to strictures. KUB ultrasound showing clots in bladder so urology recommended IR exchange nephroureterostomy tubes with standard nephrostomy tubes to allow bladder to tamponade. At IR procedure for conversion of PCNUs to PCNs pt became hypotensive and tachycardic with c/f pseudoaneurysm so transferred to MICU for further monitoring. While in MICU, did not require pressors and underwent cystoscopy, dilation, clot evacuation in the OR 6/17 with urology. Given stability transferred to floors with plan for IR nephrostomy exchange.   Underwent Aortogram, right renal angiogram, and right nephrostomy tube exchange though no evidence of pseudoaneurysm so hematuria attributed to infection vs irritation from tubes. Davis catheter removed. Rest of hospital course uncomplicated. Pt discharged to home and instructed follow-up with PCP, Urology, Oncology, ID.   For full details, please see H&P, progress notes, consult notes and ancillary notes.    Primary Admission Diagnosis: UTI + Hematuria    Primary Discharge Diagnosis:     Hospital Course:  59M w/ hx of prostate + anal cancer (s/p chemotherapy + radiation in 2020 c/b bilateral nephrostomy tubes and ostomy), HIV on Biktarvy, HTN, HLD, anxiety/depression presenting for bilateral flank pain and hematuria found to have positive UA and normal functioning nephrostomy tubes per urology suggestive of UTI. Transitioned from Zosyn to cefepime per ID recommendations given history of pseudomonas. Given history of prior treated secondary syphillis he was tested again, AB+ but confirmatory test negative, though he had a recent outpt skin biopsy so decision made to treat with 10 day course of penicillin via infusion.     Course complicated by port wine colored hematuria into bilateral L>R nephrourterostomy bags. Originally planned for CBI by urology but unable to place davis due to strictures. KUB ultrasound showing clots in bladder so urology recommended IR exchange nephroureterostomy tubes with standard nephrostomy tubes to allow bladder to tamponade. At IR procedure for conversion of PCNUs to PCNs pt became hypotensive and tachycardic with c/f pseudoaneurysm so transferred to MICU for further monitoring. While in MICU, did not require pressors and underwent cystoscopy, dilation, clot evacuation in the OR 6/17 with urology. Given stability transferred to floors with plan for IR nephrostomy exchange on 6/19. Underwent Aortogram, right renal angiogram, and right nephrostomy tube exchange though no evidence of pseudoaneurysm so hematuria attributed to infection vs irritation from tubes. Davis catheter removed. Rest of hospital course uncomplicated. Pt discharged to home and instructed follow-up with PCP, Urology, Oncology, ID.    CHANGES  > PCN via IV, 4 Million Units q4 hrs x 10 days (7/4/25)  > Flush both PCNs 10cc x qday   > f/u IR OP in 3 months time for exchange For full details, please see H&P, progress notes, consult notes and ancillary notes.    Primary Admission Diagnosis: UTI + Hematuria    Primary Discharge Diagnosis:     Hospital Course:  59M w/ hx of prostate + anal cancer (s/p chemotherapy + radiation in 2020 c/b bilateral nephrostomy tubes and ostomy), HIV on Biktarvy, HTN, HLD, anxiety/depression presenting for bilateral flank pain and hematuria found to have positive UA and normal functioning nephrostomy tubes per urology suggestive of UTI. Transitioned from Zosyn to cefepime per ID recommendations given history of pseudomonas. Given history of prior treated secondary syphillis he was tested again, AB+ but confirmatory test negative, though he had a recent outpt skin biopsy so decision made to treat with 10 day course of penicillin via infusion until 7/4/25.     Course complicated by port wine colored hematuria into bilateral L>R nephrourterostomy bags. Originally planned for CBI by urology but unable to place davis due to strictures. KUB ultrasound showing clots in bladder so urology recommended IR exchange nephroureterostomy tubes with standard nephrostomy tubes to allow bladder to tamponade. At IR procedure for conversion of PCNUs to PCNs pt became hypotensive and tachycardic with c/f pseudoaneurysm so transferred to MICU for further monitoring. While in MICU, did not require pressors and underwent cystoscopy, dilation, clot evacuation in the OR 6/17 with urology. Given stability transferred to floors with plan for IR nephrostomy exchange on 6/19. Underwent Aortogram, right renal angiogram, and right nephrostomy tube exchange though no evidence of pseudoaneurysm so hematuria attributed to infection vs irritation from tubes. Davis catheter removed. Rest of hospital course uncomplicated. Pt discharged to home and instructed follow-up with PCP, Urology, Oncology, ID.    CHANGES  > PCN via IV, 4 Million Units q4 hrs x 10 days (7/4/25)  > Flush both PCNs 5-10cc x qday   > f/u IR OP in 3 months time for exchange

## 2025-06-15 NOTE — PROGRESS NOTE ADULT - SUBJECTIVE AND OBJECTIVE BOX
PROGRESS NOTE  Patient: CARL ROWLEY   Authored by Maximo Wong, PGY-1 (Internal Medicine)    INTERVAL HX:  - NAEON  - Not in distress anymore, bloody output from R>L nephroureterostomy tubes    Review of Systems:  Unchanged from prior unless noted above.    Allergies:  No Known Allergies      Physical Exam:  GENERAL: NAD, lying in bed comfortably  HEAD:  Atraumatic, Normocephalic  EYES: EOMI, conjunctiva and sclera clear  ENT: Moist mucous membranes  CHEST/LUNG: Clear to auscultation bilaterally; No rales, rhonchi, wheezing, or rubs. Unlabored respirations  HEART: Regular rate and rhythm; No murmurs, rubs, or gallops  ABDOMEN: Soft, ostomy present near umbilicus, tender to moderate palpation near stoma, nondistended, L >R port wine colored nephroureterostomy tube output with clots in line  EXTREMITIES:  No clubbing, cyanosis, or edema  NERVOUS SYSTEM:  A&Ox3, no focal deficits   SKIN: No rashes or lesions    VS  T(C): 36.9 (06-14-25 @ 05:40), Max: 37.4 (06-13-25 @ 16:30)  HR: 88 (06-14-25 @ 07:28) (88 - 120)  BP: 110/80 (06-14-25 @ 07:28) (103/74 - 131/96)  RR: 18 (06-14-25 @ 07:28) (18 - 18)  SpO2: 100% (06-14-25 @ 07:28) (97% - 100%)    LABS (available at time of writing 06-14-25 @ 11:50):                         9.0    7.84  )-----------( 322      ( 14 Jun 2025 06:58 )             26.7     06-14    139  |  104  |  26[H]  ----------------------------<  122[H]  3.9   |  25  |  1.00    Ca    9.2      14 Jun 2025 06:58  Phos  2.3     06-14  Mg     2.30     06-14    TPro  6.8  /  Alb  3.7  /  TBili  0.2  /  DBili  x   /  AST  14  /  ALT  16  /  AlkPhos  81  06-13    PT/INR - ( 14 Jun 2025 06:58 )   PT: 13.4 sec;   INR: 1.13 ratio         PTT - ( 14 Jun 2025 06:58 )  PTT:25.7 sec  Urinalysis Basic - ( 14 Jun 2025 06:58 )    Color: x / Appearance: x / SG: x / pH: x  Gluc: 122 mg/dL / Ketone: x  / Bili: x / Urobili: x   Blood: x / Protein: x / Nitrite: x   Leuk Esterase: x / RBC: x / WBC x   Sq Epi: x / Non Sq Epi: x / Bacteria: x          Culture - Blood (collected 06-12-25 @ 18:07)  Source: Blood Blood  Preliminary Report (06-13-25 @ 22:02):    No growth at 24 hours    Culture - Blood (collected 06-12-25 @ 17:52)  Source: Blood Blood  Preliminary Report (06-13-25 @ 22:02):    No growth at 24 hours    Urinalysis with Rflx Culture (collected 06-10-25 @ 09:50)    Urinalysis with Rflx Culture (collected 06-10-25 @ 08:00)    Culture - Urine (collected 06-10-25 @ 08:00)  Source: Urine  Preliminary Report (06-11-25 @ 16:53):    >100,000 CFU/ml Gram Negative Rods        Medications:   acetaminophen     Tablet .. 650 milliGRAM(s) Oral every 6 hours PRN  bictegravir 50 mG/emtricitabine 200 mG/tenofovir alafenamide 25 mG (BIKTARVY) 1 Tablet(s) Oral daily  bisacodyl 5 milliGRAM(s) Oral every 12 hours  buPROPion XL (24-Hour) . 150 milliGRAM(s) Oral daily  cefepime   IVPB      cefepime   IVPB 2000 milliGRAM(s) IV Intermittent every 12 hours  cholecalciferol 2000 Unit(s) Oral daily  clonazePAM  Tablet 1 milliGRAM(s) Oral two times a day PRN  diltiazem    milliGRAM(s) Oral daily  ezetimibe 10 milliGRAM(s) Oral at bedtime  HYDROmorphone  Injectable 0.5 milliGRAM(s) IV Push every 4 hours PRN  melatonin 3 milliGRAM(s) Oral at bedtime PRN  metoprolol tartrate 50 milliGRAM(s) Oral every 12 hours  multivitamin 1 Tablet(s) Oral daily  oxybutynin 5 milliGRAM(s) Oral three times a day PRN  oxyCODONE    IR 5 milliGRAM(s) Oral every 4 hours PRN  polyethylene glycol 3350 17 Gram(s) Oral two times a day  potassium phosphate / sodium phosphate Tablet (K-PHOS No. 2) 2 Tablet(s) Oral every 6 hours  senna 2 Tablet(s) Oral at bedtime  sodium chloride 0.9%. 1000 milliLiter(s) IV Continuous <Continuous>  traZODone 50 milliGRAM(s) Oral at bedtime  zolpidem 5 milliGRAM(s) Oral at bedtime PRN  zolpidem 5 milliGRAM(s) Oral at bedtime PRN      RADIOLOGY, EKG & ADDITIONAL TESTS: Reviewed.    PROGRESS NOTE  Patient: CARL ROWLEY   Authored by Maximo Wong, PGY-1 (Internal Medicine)    INTERVAL HX:  - NAEON  - Still having pain from passing clots through the urethra    Review of Systems:  Unchanged from prior unless noted above.    Allergies:  No Known Allergies      Physical Exam:  GENERAL: NAD, lying in bed comfortably  HEAD:  Atraumatic, Normocephalic  EYES: EOMI, conjunctiva and sclera clear  ENT: Moist mucous membranes  CHEST/LUNG: Clear to auscultation bilaterally; No rales, rhonchi, wheezing, or rubs. Unlabored respirations  HEART: Regular rate and rhythm; No murmurs, rubs, or gallops  ABDOMEN: Soft, ostomy present near umbilicus, tender to moderate palpation near stoma, nondistended, L >R port wine colored nephroureterostomy tube output with clots in line  EXTREMITIES:  No clubbing, cyanosis, or edema  NERVOUS SYSTEM:  A&Ox3, no focal deficits   SKIN: No rashes or lesions    VS  T(C): 36.7 (06-15-25 @ 05:18), Max: 36.7 (06-14-25 @ 20:37)  HR: 110 (06-15-25 @ 08:59) (63 - 112)  BP: 114/83 (06-15-25 @ 08:59) (107/76 - 117/79)  RR: 18 (06-15-25 @ 08:59) (18 - 19)  SpO2: 100% (06-15-25 @ 08:59) (98% - 100%)    LABS (available at time of writing 06-15-25 @ 11:45):                         8.1    6.63  )-----------( 310      ( 15 Al 2025 06:53 )             24.5     06-15    139  |  104  |  24[H]  ----------------------------<  111[H]  3.6   |  23  |  0.98    Ca    8.8      15 Al 2025 06:53  Phos  2.8     06-15  Mg     2.10     06-15      PT/INR - ( 14 Jun 2025 06:58 )   PT: 13.4 sec;   INR: 1.13 ratio         PTT - ( 14 Jun 2025 06:58 )  PTT:25.7 sec  Urinalysis Basic - ( 15 Al 2025 06:53 )    Color: x / Appearance: x / SG: x / pH: x  Gluc: 111 mg/dL / Ketone: x  / Bili: x / Urobili: x   Blood: x / Protein: x / Nitrite: x   Leuk Esterase: x / RBC: x / WBC x   Sq Epi: x / Non Sq Epi: x / Bacteria: x          Culture - Blood (collected 06-12-25 @ 18:07)  Source: Blood Blood  Preliminary Report (06-14-25 @ 22:02):    No growth at 48 Hours    Culture - Blood (collected 06-12-25 @ 17:52)  Source: Blood Blood  Preliminary Report (06-14-25 @ 22:02):    No growth at 48 Hours        Medications:   acetaminophen     Tablet .. 650 milliGRAM(s) Oral every 6 hours PRN  bictegravir 50 mG/emtricitabine 200 mG/tenofovir alafenamide 25 mG (BIKTARVY) 1 Tablet(s) Oral daily  bisacodyl 5 milliGRAM(s) Oral every 12 hours  buPROPion XL (24-Hour) . 150 milliGRAM(s) Oral daily  cefepime   IVPB      cefepime   IVPB 2000 milliGRAM(s) IV Intermittent every 12 hours  cholecalciferol 2000 Unit(s) Oral daily  clonazePAM  Tablet 1 milliGRAM(s) Oral two times a day PRN  diltiazem    milliGRAM(s) Oral daily  ezetimibe 10 milliGRAM(s) Oral at bedtime  HYDROmorphone  Injectable 0.5 milliGRAM(s) IV Push every 4 hours PRN  melatonin 3 milliGRAM(s) Oral at bedtime PRN  metoprolol tartrate 50 milliGRAM(s) Oral every 12 hours  multivitamin 1 Tablet(s) Oral daily  oxybutynin 5 milliGRAM(s) Oral three times a day PRN  oxyCODONE    IR 5 milliGRAM(s) Oral every 4 hours PRN  polyethylene glycol 3350 17 Gram(s) Oral two times a day  sodium chloride 0.9%. 1000 milliLiter(s) IV Continuous <Continuous>  traZODone 50 milliGRAM(s) Oral at bedtime  zolpidem 5 milliGRAM(s) Oral at bedtime PRN  zolpidem 5 milliGRAM(s) Oral at bedtime PRN      RADIOLOGY, EKG & ADDITIONAL TESTS: Reviewed.

## 2025-06-15 NOTE — DISCHARGE NOTE PROVIDER - CARE PROVIDER_API CALL
Jarad Schwartz (DO)  Family Medicine  [address], [address]  [place] [address]  Phone: [phone]  Fax: [phone]  Established Patient  Follow Up Time: 1 week    Andres Smith  Medical Oncology  [address] [place] [address]  Phone: [phone]  Fax: [phone]  Established Patient  Follow Up Time: Routine    Ravi Mathew  Urology  [address] [facility] [facility], [address]  [place] [address]  Phone: [phone]  Fax: [phone]  Established Patient  Follow Up Time: 2 weeks   Jarad Schwartz (DO)  Family Medicine  [address] [facility], [address]  [place] [address]  Phone: [phone]  Fax: [phone]  Established Patient  Follow Up Time: 1 week    Andres Smith  Hematology  [address] [place] [address]  Phone: [phone]  Fax: [phone]  Established Patient  Follow Up Time: Routine    Ravi Mathew  Urology  [address] [facility]  [place] [address]  Phone: [phone]  Fax: [phone]  Established Patient  Follow Up Time: 2 weeks    Kai Pagan  Radiology Vascular & Interventional Radiology  [address] [place] [address]  Phone: [phone]  Fax: [phone]  Follow Up Time:

## 2025-06-15 NOTE — PROGRESS NOTE ADULT - PROBLEM SELECTOR PLAN 1
UA positive with bilateral nephrostomy site pain consistent with complex UTI without clinical or radiographic signs of pyelonephritis. Has a history of both sensitive and carbapenem-resistant pseudomonas. During hospital course, worsening tachycardia and pain with white count elevating. Also has history of secondary syphillis which was treated ~1 year ago.    - Zosyn (6/10PM -6/12), Cefepime 2g Q12 (6/12-  - UCx w/ >100k GNR; drawn from Nephrostomy tube   - ID following, appreciate recs  - f/u RPR level per ID UA positive with bilateral nephrostomy site pain consistent with complex UTI without clinical or radiographic signs of pyelonephritis. Has a history of both sensitive and carbapenem-resistant pseudomonas. During hospital course, worsening tachycardia and pain with white count elevating. Also has history of secondary syphillis which was treated ~1 year ago. RPR negative.    - Zosyn (6/10PM -6/12), Cefepime 2g Q12 (6/12-  - UCx w/ >100k GNR; drawn from Nephrostomy tube  - Call micro lab if not speciated yet by 6/16  - ID following, appreciate recs  - f/u negative RPR with ID

## 2025-06-15 NOTE — PROGRESS NOTE ADULT - PROBLEM SELECTOR PLAN 4
Sinus tach to 130s on 6/12 ekg w/ sinus tach. Likely iso pain vs. bleed vs. worsening sepsis +/- reflex from holding Metoprolol.     - Encourage home Klonopin use per patient usual schedule to avoid mild withdrawal symptoms  - Pain management and infection control   - plan per hematuria.   - Restarted BB as Lopressor 50mg BID  - Cont home diltiazem 240mg daily

## 2025-06-15 NOTE — PROGRESS NOTE ADULT - PROBLEM SELECTOR PLAN 3
On diltiazem 240mg and metoprolol [id number] at home. Follows with cardiology but has no documented tachyarrhythmia history. Not septic during this admission.    - Continue home diltiazem 240 mg  - Continue home BB as Lopressor 50mg BID  - inpatient BP goals: SBP <140 (given hematuria)

## 2025-06-16 DIAGNOSIS — A51.49 OTHER SECONDARY SYPHILITIC CONDITIONS: ICD-10-CM

## 2025-06-16 PROBLEM — R31.0 GROSS HEMATURIA: Status: ACTIVE | Noted: 2025-06-16

## 2025-06-16 LAB
ALBUMIN SERPL ELPH-MCNC: 1.2 G/DL — LOW (ref 3.3–5)
ALBUMIN SERPL ELPH-MCNC: 3.1 G/DL — LOW (ref 3.3–5)
ALBUMIN SERPL ELPH-MCNC: 3.4 G/DL — SIGNIFICANT CHANGE UP (ref 3.3–5)
ALP SERPL-CCNC: 26 U/L — LOW (ref 40–120)
ALP SERPL-CCNC: 71 U/L — SIGNIFICANT CHANGE UP (ref 40–120)
ALP SERPL-CCNC: 75 U/L — SIGNIFICANT CHANGE UP (ref 40–120)
ALT FLD-CCNC: 30 U/L — SIGNIFICANT CHANGE UP (ref 4–41)
ALT FLD-CCNC: 33 U/L — SIGNIFICANT CHANGE UP (ref 4–41)
ALT FLD-CCNC: <5 U/L — SIGNIFICANT CHANGE UP (ref 4–41)
ANION GAP SERPL CALC-SCNC: 10 MMOL/L — SIGNIFICANT CHANGE UP (ref 7–14)
ANION GAP SERPL CALC-SCNC: 12 MMOL/L — SIGNIFICANT CHANGE UP (ref 7–14)
ANION GAP SERPL CALC-SCNC: 9 MMOL/L — SIGNIFICANT CHANGE UP (ref 7–14)
APTT BLD: 26.6 SEC — SIGNIFICANT CHANGE UP (ref 26.1–36.8)
AST SERPL-CCNC: 12 U/L — SIGNIFICANT CHANGE UP (ref 4–40)
AST SERPL-CCNC: 19 U/L — SIGNIFICANT CHANGE UP (ref 4–40)
AST SERPL-CCNC: 19 U/L — SIGNIFICANT CHANGE UP (ref 4–40)
BASOPHILS # BLD AUTO: 0.04 K/UL — SIGNIFICANT CHANGE UP (ref 0–0.2)
BASOPHILS # BLD AUTO: 0.05 K/UL — SIGNIFICANT CHANGE UP (ref 0–0.2)
BASOPHILS # BLD AUTO: 0.07 K/UL — SIGNIFICANT CHANGE UP (ref 0–0.2)
BASOPHILS NFR BLD AUTO: 0.3 % — SIGNIFICANT CHANGE UP (ref 0–2)
BASOPHILS NFR BLD AUTO: 0.4 % — SIGNIFICANT CHANGE UP (ref 0–2)
BASOPHILS NFR BLD AUTO: 0.7 % — SIGNIFICANT CHANGE UP (ref 0–2)
BILIRUB SERPL-MCNC: 0.2 MG/DL — SIGNIFICANT CHANGE UP (ref 0.2–1.2)
BILIRUB SERPL-MCNC: <0.2 MG/DL — SIGNIFICANT CHANGE UP (ref 0.2–1.2)
BILIRUB SERPL-MCNC: <0.2 MG/DL — SIGNIFICANT CHANGE UP (ref 0.2–1.2)
BLD GP AB SCN SERPL QL: NEGATIVE — SIGNIFICANT CHANGE UP
BLD GP AB SCN SERPL QL: NEGATIVE — SIGNIFICANT CHANGE UP
BLOOD GAS ARTERIAL - LYTES,HGB,ICA,LACT RESULT: SIGNIFICANT CHANGE UP
BLOOD GAS ARTERIAL COMPREHENSIVE RESULT: SIGNIFICANT CHANGE UP
BLOOD GAS VENOUS COMPREHENSIVE RESULT: SIGNIFICANT CHANGE UP
BUN SERPL-MCNC: 18 MG/DL — SIGNIFICANT CHANGE UP (ref 7–23)
BUN SERPL-MCNC: 19 MG/DL — SIGNIFICANT CHANGE UP (ref 7–23)
BUN SERPL-MCNC: 8 MG/DL — SIGNIFICANT CHANGE UP (ref 7–23)
CALCIUM SERPL-MCNC: 3.1 MG/DL — CRITICAL LOW (ref 8.4–10.5)
CALCIUM SERPL-MCNC: 8.1 MG/DL — LOW (ref 8.4–10.5)
CALCIUM SERPL-MCNC: 8.5 MG/DL — SIGNIFICANT CHANGE UP (ref 8.4–10.5)
CHLORIDE SERPL-SCNC: 104 MMOL/L — SIGNIFICANT CHANGE UP (ref 98–107)
CHLORIDE SERPL-SCNC: 106 MMOL/L — SIGNIFICANT CHANGE UP (ref 98–107)
CHLORIDE SERPL-SCNC: 128 MMOL/L — HIGH (ref 98–107)
CO2 SERPL-SCNC: 23 MMOL/L — SIGNIFICANT CHANGE UP (ref 22–31)
CO2 SERPL-SCNC: 23 MMOL/L — SIGNIFICANT CHANGE UP (ref 22–31)
CO2 SERPL-SCNC: 8 MMOL/L — CRITICAL LOW (ref 22–31)
CREAT SERPL-MCNC: 0.44 MG/DL — LOW (ref 0.5–1.3)
CREAT SERPL-MCNC: 0.88 MG/DL — SIGNIFICANT CHANGE UP (ref 0.5–1.3)
CREAT SERPL-MCNC: 1.18 MG/DL — SIGNIFICANT CHANGE UP (ref 0.5–1.3)
EGFR: 100 ML/MIN/1.73M2 — SIGNIFICANT CHANGE UP
EGFR: 100 ML/MIN/1.73M2 — SIGNIFICANT CHANGE UP
EGFR: 123 ML/MIN/1.73M2 — SIGNIFICANT CHANGE UP
EGFR: 123 ML/MIN/1.73M2 — SIGNIFICANT CHANGE UP
EGFR: 72 ML/MIN/1.73M2 — SIGNIFICANT CHANGE UP
EGFR: 72 ML/MIN/1.73M2 — SIGNIFICANT CHANGE UP
EOSINOPHIL # BLD AUTO: 0.02 K/UL — SIGNIFICANT CHANGE UP (ref 0–0.5)
EOSINOPHIL # BLD AUTO: 0.09 K/UL — SIGNIFICANT CHANGE UP (ref 0–0.5)
EOSINOPHIL # BLD AUTO: 0.14 K/UL — SIGNIFICANT CHANGE UP (ref 0–0.5)
EOSINOPHIL NFR BLD AUTO: 0.1 % — SIGNIFICANT CHANGE UP (ref 0–6)
EOSINOPHIL NFR BLD AUTO: 0.9 % — SIGNIFICANT CHANGE UP (ref 0–6)
EOSINOPHIL NFR BLD AUTO: 1.2 % — SIGNIFICANT CHANGE UP (ref 0–6)
GAS PNL BLDV: SIGNIFICANT CHANGE UP
GLUCOSE SERPL-MCNC: 109 MG/DL — HIGH (ref 70–99)
GLUCOSE SERPL-MCNC: 110 MG/DL — HIGH (ref 70–99)
GLUCOSE SERPL-MCNC: 99 MG/DL — SIGNIFICANT CHANGE UP (ref 70–99)
HCT VFR BLD CALC: 24.5 % — LOW (ref 39–50)
HCT VFR BLD CALC: 26.3 % — LOW (ref 39–50)
HCT VFR BLD CALC: 27.1 % — LOW (ref 39–50)
HCT VFR BLD CALC: 28 % — LOW (ref 39–50)
HGB BLD-MCNC: 8.3 G/DL — LOW (ref 13–17)
HGB BLD-MCNC: 8.9 G/DL — LOW (ref 13–17)
HGB BLD-MCNC: 9.2 G/DL — LOW (ref 13–17)
HGB BLD-MCNC: 9.2 G/DL — LOW (ref 13–17)
IANC: 11.44 K/UL — HIGH (ref 1.8–7.4)
IANC: 12.63 K/UL — HIGH (ref 1.8–7.4)
IANC: 3.89 K/UL — SIGNIFICANT CHANGE UP (ref 1.8–7.4)
IMM GRANULOCYTES NFR BLD AUTO: 1.2 % — HIGH (ref 0–0.9)
IMM GRANULOCYTES NFR BLD AUTO: 2.1 % — HIGH (ref 0–0.9)
IMM GRANULOCYTES NFR BLD AUTO: 3.2 % — HIGH (ref 0–0.9)
INR BLD: 1.05 RATIO — SIGNIFICANT CHANGE UP (ref 0.85–1.16)
LYMPHOCYTES # BLD AUTO: 1.77 K/UL — SIGNIFICANT CHANGE UP (ref 1–3.3)
LYMPHOCYTES # BLD AUTO: 11.2 % — LOW (ref 13–44)
LYMPHOCYTES # BLD AUTO: 15.4 % — SIGNIFICANT CHANGE UP (ref 13–44)
LYMPHOCYTES # BLD AUTO: 2.43 K/UL — SIGNIFICANT CHANGE UP (ref 1–3.3)
LYMPHOCYTES # BLD AUTO: 2.44 K/UL — SIGNIFICANT CHANGE UP (ref 1–3.3)
LYMPHOCYTES # BLD AUTO: 32.7 % — SIGNIFICANT CHANGE UP (ref 13–44)
MAGNESIUM SERPL-MCNC: 2 MG/DL — SIGNIFICANT CHANGE UP (ref 1.6–2.6)
MAGNESIUM SERPL-MCNC: 2 MG/DL — SIGNIFICANT CHANGE UP (ref 1.6–2.6)
MCHC RBC-ENTMCNC: 30 PG — SIGNIFICANT CHANGE UP (ref 27–34)
MCHC RBC-ENTMCNC: 30.1 PG — SIGNIFICANT CHANGE UP (ref 27–34)
MCHC RBC-ENTMCNC: 30.4 PG — SIGNIFICANT CHANGE UP (ref 27–34)
MCHC RBC-ENTMCNC: 30.5 PG — SIGNIFICANT CHANGE UP (ref 27–34)
MCHC RBC-ENTMCNC: 32.9 G/DL — SIGNIFICANT CHANGE UP (ref 32–36)
MCHC RBC-ENTMCNC: 33.8 G/DL — SIGNIFICANT CHANGE UP (ref 32–36)
MCHC RBC-ENTMCNC: 33.9 G/DL — SIGNIFICANT CHANGE UP (ref 32–36)
MCHC RBC-ENTMCNC: 33.9 G/DL — SIGNIFICANT CHANGE UP (ref 32–36)
MCV RBC AUTO: 88.4 FL — SIGNIFICANT CHANGE UP (ref 80–100)
MCV RBC AUTO: 89.7 FL — SIGNIFICANT CHANGE UP (ref 80–100)
MCV RBC AUTO: 89.8 FL — SIGNIFICANT CHANGE UP (ref 80–100)
MCV RBC AUTO: 91.5 FL — SIGNIFICANT CHANGE UP (ref 80–100)
MONOCYTES # BLD AUTO: 0.87 K/UL — SIGNIFICANT CHANGE UP (ref 0–0.9)
MONOCYTES # BLD AUTO: 0.96 K/UL — HIGH (ref 0–0.9)
MONOCYTES # BLD AUTO: 1.26 K/UL — HIGH (ref 0–0.9)
MONOCYTES NFR BLD AUTO: 11.7 % — SIGNIFICANT CHANGE UP (ref 2–14)
MONOCYTES NFR BLD AUTO: 6.1 % — SIGNIFICANT CHANGE UP (ref 2–14)
MONOCYTES NFR BLD AUTO: 7.9 % — SIGNIFICANT CHANGE UP (ref 2–14)
NEUTROPHILS # BLD AUTO: 11.44 K/UL — HIGH (ref 1.8–7.4)
NEUTROPHILS # BLD AUTO: 12.63 K/UL — HIGH (ref 1.8–7.4)
NEUTROPHILS # BLD AUTO: 3.89 K/UL — SIGNIFICANT CHANGE UP (ref 1.8–7.4)
NEUTROPHILS NFR BLD AUTO: 52.5 % — SIGNIFICANT CHANGE UP (ref 43–77)
NEUTROPHILS NFR BLD AUTO: 72.2 % — SIGNIFICANT CHANGE UP (ref 43–77)
NEUTROPHILS NFR BLD AUTO: 80.2 % — HIGH (ref 43–77)
NRBC # BLD AUTO: 0 K/UL — SIGNIFICANT CHANGE UP (ref 0–0)
NRBC # BLD AUTO: 0.03 K/UL — HIGH (ref 0–0)
NRBC # BLD AUTO: 0.04 K/UL — HIGH (ref 0–0)
NRBC # BLD AUTO: 0.07 K/UL — HIGH (ref 0–0)
NRBC # FLD: 0 K/UL — SIGNIFICANT CHANGE UP (ref 0–0)
NRBC # FLD: 0.03 K/UL — HIGH (ref 0–0)
NRBC # FLD: 0.04 K/UL — HIGH (ref 0–0)
NRBC # FLD: 0.07 K/UL — HIGH (ref 0–0)
NRBC BLD AUTO-RTO: 0 /100 WBCS — SIGNIFICANT CHANGE UP (ref 0–0)
PHOSPHATE SERPL-MCNC: 2.5 MG/DL — SIGNIFICANT CHANGE UP (ref 2.5–4.5)
PHOSPHATE SERPL-MCNC: 2.7 MG/DL — SIGNIFICANT CHANGE UP (ref 2.5–4.5)
PLATELET # BLD AUTO: 269 K/UL — SIGNIFICANT CHANGE UP (ref 150–400)
PLATELET # BLD AUTO: 276 K/UL — SIGNIFICANT CHANGE UP (ref 150–400)
PLATELET # BLD AUTO: 312 K/UL — SIGNIFICANT CHANGE UP (ref 150–400)
PLATELET # BLD AUTO: 324 K/UL — SIGNIFICANT CHANGE UP (ref 150–400)
POTASSIUM SERPL-MCNC: 1.7 MMOL/L — CRITICAL LOW (ref 3.5–5.3)
POTASSIUM SERPL-MCNC: 3.8 MMOL/L — SIGNIFICANT CHANGE UP (ref 3.5–5.3)
POTASSIUM SERPL-MCNC: 4.2 MMOL/L — SIGNIFICANT CHANGE UP (ref 3.5–5.3)
POTASSIUM SERPL-SCNC: 1.7 MMOL/L — CRITICAL LOW (ref 3.5–5.3)
POTASSIUM SERPL-SCNC: 3.8 MMOL/L — SIGNIFICANT CHANGE UP (ref 3.5–5.3)
POTASSIUM SERPL-SCNC: 4.2 MMOL/L — SIGNIFICANT CHANGE UP (ref 3.5–5.3)
PROT SERPL-MCNC: 2.3 G/DL — LOW (ref 6–8.3)
PROT SERPL-MCNC: 5.7 G/DL — LOW (ref 6–8.3)
PROT SERPL-MCNC: 5.9 G/DL — LOW (ref 6–8.3)
PROTHROM AB SERPL-ACNC: 12.5 SEC — SIGNIFICANT CHANGE UP (ref 9.9–13.4)
RBC # BLD: 2.77 M/UL — LOW (ref 4.2–5.8)
RBC # BLD: 2.93 M/UL — LOW (ref 4.2–5.8)
RBC # BLD: 3.02 M/UL — LOW (ref 4.2–5.8)
RBC # BLD: 3.06 M/UL — LOW (ref 4.2–5.8)
RBC # FLD: 14.1 % — SIGNIFICANT CHANGE UP (ref 10.3–14.5)
RBC # FLD: 14.2 % — SIGNIFICANT CHANGE UP (ref 10.3–14.5)
RBC # FLD: 14.3 % — SIGNIFICANT CHANGE UP (ref 10.3–14.5)
RBC # FLD: 14.3 % — SIGNIFICANT CHANGE UP (ref 10.3–14.5)
RH IG SCN BLD-IMP: NEGATIVE — SIGNIFICANT CHANGE UP
RH IG SCN BLD-IMP: NEGATIVE — SIGNIFICANT CHANGE UP
SODIUM SERPL-SCNC: 138 MMOL/L — SIGNIFICANT CHANGE UP (ref 135–145)
SODIUM SERPL-SCNC: 139 MMOL/L — SIGNIFICANT CHANGE UP (ref 135–145)
SODIUM SERPL-SCNC: 146 MMOL/L — HIGH (ref 135–145)
WBC # BLD: 13.38 K/UL — HIGH (ref 3.8–10.5)
WBC # BLD: 15.75 K/UL — HIGH (ref 3.8–10.5)
WBC # BLD: 15.86 K/UL — HIGH (ref 3.8–10.5)
WBC # BLD: 7.42 K/UL — SIGNIFICANT CHANGE UP (ref 3.8–10.5)
WBC # FLD AUTO: 13.38 K/UL — HIGH (ref 3.8–10.5)
WBC # FLD AUTO: 15.75 K/UL — HIGH (ref 3.8–10.5)
WBC # FLD AUTO: 15.86 K/UL — HIGH (ref 3.8–10.5)
WBC # FLD AUTO: 7.42 K/UL — SIGNIFICANT CHANGE UP (ref 3.8–10.5)

## 2025-06-16 PROCEDURE — 99232 SBSQ HOSP IP/OBS MODERATE 35: CPT

## 2025-06-16 PROCEDURE — 74174 CTA ABD&PLVS W/CONTRAST: CPT | Mod: 26

## 2025-06-16 PROCEDURE — G0545: CPT

## 2025-06-16 PROCEDURE — 50435 EXCHANGE NEPHROSTOMY CATH: CPT | Mod: RT

## 2025-06-16 PROCEDURE — 93010 ELECTROCARDIOGRAM REPORT: CPT

## 2025-06-16 PROCEDURE — 99291 CRITICAL CARE FIRST HOUR: CPT | Mod: GC

## 2025-06-16 PROCEDURE — 99233 SBSQ HOSP IP/OBS HIGH 50: CPT | Mod: GC

## 2025-06-16 RX ORDER — HYDROMORPHONE/SOD CHLOR,ISO/PF 2 MG/10 ML
0.5 SYRINGE (ML) INJECTION ONCE
Refills: 0 | Status: DISCONTINUED | OUTPATIENT
Start: 2025-06-16 | End: 2025-06-16

## 2025-06-16 RX ORDER — ACETAMINOPHEN 500 MG/5ML
975 LIQUID (ML) ORAL EVERY 8 HOURS
Refills: 0 | Status: DISCONTINUED | OUTPATIENT
Start: 2025-06-16 | End: 2025-06-24

## 2025-06-16 RX ORDER — SODIUM CHLORIDE 9 G/1000ML
1000 INJECTION, SOLUTION INTRAVENOUS ONCE
Refills: 0 | Status: DISCONTINUED | OUTPATIENT
Start: 2025-06-16 | End: 2025-06-16

## 2025-06-16 RX ORDER — OXYBUTYNIN CHLORIDE 5 MG/1
5 TABLET, FILM COATED, EXTENDED RELEASE ORAL EVERY 8 HOURS
Refills: 0 | Status: DISCONTINUED | OUTPATIENT
Start: 2025-06-16 | End: 2025-06-24

## 2025-06-16 RX ORDER — DOXYCYCLINE HYCLATE 100 MG
100 TABLET ORAL EVERY 12 HOURS
Refills: 0 | Status: DISCONTINUED | OUTPATIENT
Start: 2025-06-16 | End: 2025-06-18

## 2025-06-16 RX ADMIN — Medication 1 APPLICATION(S): at 21:31

## 2025-06-16 RX ADMIN — CEFEPIME 100 MILLIGRAM(S): 2 INJECTION, POWDER, FOR SOLUTION INTRAVENOUS at 06:04

## 2025-06-16 RX ADMIN — Medication 0.5 MILLIGRAM(S): at 16:17

## 2025-06-16 RX ADMIN — Medication 0.5 MILLIGRAM(S): at 06:17

## 2025-06-16 RX ADMIN — Medication 0.5 MILLIGRAM(S): at 16:35

## 2025-06-16 RX ADMIN — OXYBUTYNIN CHLORIDE 5 MILLIGRAM(S): 5 TABLET, FILM COATED, EXTENDED RELEASE ORAL at 22:01

## 2025-06-16 RX ADMIN — Medication 1 TABLET(S): at 12:09

## 2025-06-16 RX ADMIN — Medication 2000 UNIT(S): at 12:09

## 2025-06-16 RX ADMIN — Medication 50 MILLIGRAM(S): at 22:01

## 2025-06-16 RX ADMIN — Medication 0.5 MILLIGRAM(S): at 10:33

## 2025-06-16 RX ADMIN — Medication 975 MILLIGRAM(S): at 22:02

## 2025-06-16 RX ADMIN — Medication 5 MILLIGRAM(S): at 05:18

## 2025-06-16 RX ADMIN — Medication 975 MILLIGRAM(S): at 22:50

## 2025-06-16 RX ADMIN — Medication 0.5 MILLIGRAM(S): at 21:31

## 2025-06-16 RX ADMIN — EZETIMIBE 10 MILLIGRAM(S): 10 TABLET ORAL at 22:00

## 2025-06-16 RX ADMIN — CEFEPIME 100 MILLIGRAM(S): 2 INJECTION, POWDER, FOR SOLUTION INTRAVENOUS at 19:01

## 2025-06-16 RX ADMIN — Medication 0.5 MILLIGRAM(S): at 05:17

## 2025-06-16 RX ADMIN — DILTIAZEM HYDROCHLORIDE 240 MILLIGRAM(S): 240 TABLET, EXTENDED RELEASE ORAL at 05:18

## 2025-06-16 RX ADMIN — BICTEGRAVIR SODIUM, EMTRICITABINE, AND TENOFOVIR ALAFENAMIDE FUMARATE 1 TABLET(S): 50; 200; 25 TABLET ORAL at 12:08

## 2025-06-16 RX ADMIN — CLONAZEPAM 1 MILLIGRAM(S): 0.5 TABLET ORAL at 21:34

## 2025-06-16 RX ADMIN — METOPROLOL SUCCINATE 50 MILLIGRAM(S): 50 TABLET, EXTENDED RELEASE ORAL at 05:18

## 2025-06-16 RX ADMIN — Medication 0.5 MILLIGRAM(S): at 22:30

## 2025-06-16 RX ADMIN — BUPROPION HYDROBROMIDE 150 MILLIGRAM(S): 522 TABLET, EXTENDED RELEASE ORAL at 12:09

## 2025-06-16 RX ADMIN — Medication 100 MILLIGRAM(S): at 21:31

## 2025-06-16 RX ADMIN — Medication 0.5 MILLIGRAM(S): at 09:54

## 2025-06-16 NOTE — PROVIDER CONTACT NOTE (CRITICAL VALUE NOTIFICATION) - TEST AND RESULT REPORTED:
K 1.3, Chloride 124, Calcium 0.59, unable to obtain glucose or hct, hgb less than 4, co2 9
K 1.7, Calcium 3.1, co2 8
Lactate 4.8
syphilis screen +

## 2025-06-16 NOTE — PROGRESS NOTE ADULT - ASSESSMENT
58 y/o M w/ PMhx of HTN, HLD, anxiety/depression, HIV on Biktarvy (CD4 624 in 12/2024 and VL UD 5/25),  h/o anal and prostate Ca (s/p chemotherapy + radiation in 2020 c/b bilateral nephrostomy tubes and ostomy),  ureteral stricture s/p stenting and bow with b/l nephroureteral tubes (last exchanged 5/9), recent parastomal hernia repair with mesh in 4/2025 admitted to [facility] on 6/10 for b/l flank pain, hematuria  Pt afebrile, initially w/o leukocytosis, now 13, UCx w/ GNR  CT A/P with b/l PCT with distal tips coiled in the bladder  D/w outpatient ID doctor Dr. 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   On exam, pt with some tenderness at site of PCNs, gross blood in both nephrostomy bags  Trep+, RPR neg, MHA neg noted; discussed with outpatient provider  Overall, UTI, Positive culture finding  - Cefepime 2 g q 12 to cover organisms grown in past (enterobacter, pseudomonas), anticipate plan for 7 days (depending on pending S pattern)  - Start Doxycycline 100mg q 12, plan for 14 days (for outpatient finding of spirochetes on skin BX)  - C/w Biktarvy, HIV well controlled   - F/u UCx  - F/U IR/Urology  - F/U BCXs    Nick Albarran MD  Contact on TEAMS messaging from 9am - 5pm  From 5pm-9am, on weekends, or if no response call [phone]    Antibiotic decision making based on local antibiogram and available recent culture results

## 2025-06-16 NOTE — PROCEDURE NOTE - PLAN
- IR will hold off on angiogram at this time  - Recommend close ICU monitoring  - IR will follow. Can reconsult or page [phone] for emergency. - IR will hold off on angiogram at this time  - Recommend close ICU monitoring  - IR will follow. If patient rebleeds can reconsult or page [phone] for emergency. - IR will hold off on angiogram at this time - bleeding stopped after nephrostomy reinsertion and site of bleeding on angiography may be obscured by contrast within the collecting system from drain exchange  - Recommend close ICU monitoring  - IR will follow. If patient rebleeds can reconsult or page [phone] for emergency.

## 2025-06-16 NOTE — PROGRESS NOTE ADULT - PROBLEM SELECTOR PLAN 9
DVT ppx: SCD given hematuria  Diet: Regular  Dispo: Pending Hospital course; no skilled PT needs  Code Status: Full Code S/p chemotherapy + radiation in 2020 c/b bilateral nephrostomy tubes and ostomy. In remission. Follows with Dr. Andres Funez (oncology)    - Follow up outpatient with oncology given no active concerns

## 2025-06-16 NOTE — RAPID RESPONSE TEAM SUMMARY - NSSITUATIONBACKGROUNDRRT_GEN_ALL_CORE
60 yo M w/ PMHx of HIV on Biktarvy, HTN, prostate cancer and anal cancer s/p chemotherapy/radiation c/b b/l nephrostomy tubes (exchanged on 5/9 by IR) and ostomy presents for a few days of bilateral lower back/flank pain and bloody urine/passage of clots since 6 PM 6/9/25. CTAP demonstrates bilateral nephroureteral tubes in appropriate position with no hydronephrosis bilaterally. 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 Was undergoing conversion of PCNU's to PCN's to prevent refluxing up the PCNU's and able to have successful conversion of the R side when patient became tachycardiac and tachypneic and grossly appearing unwell.    RRT called for tachycardia HR > 130 and tachypnea RR 40 with BP 90s/70s. Patient given 1L IVF via pressure bag with 1u pRBC was called with minimal improvement in BP. Incontinent brief on with significant distention and evidence of blood leaking. Clots noted within man. Bilateral tubes with red output but not marvin blood. Noted to have improvement in both HR and BP after 1u pRBC transfusion. CTA AP performed given temporal nature to PCN exchange however, without clinical suspicion for active fistula or pseudoaneurysm that would benefit from angiogram intervention. Seen by urology at bedside with initial plan for bladder evacuation in OR. Also developing R flank and suprapubic pain with persistent clot passage via urethra.     Transfer to MICU for ongoing care.

## 2025-06-16 NOTE — PROGRESS NOTE ADULT - SUBJECTIVE AND OBJECTIVE BOX
Subjective  Denies fevers, chills, nausea, vomiting, SOB, CP. Patient endorses less spasms but still states that he is passing clots via urethra.   Tolerating diet.    Objective    Vital signs  T(F): , Max: 99.3 (06-15-25 @ 21:32)  HR: 82 (06-16-25 @ 05:17)  BP: 108/83 (06-16-25 @ 05:17)  SpO2: 97% (06-16-25 @ 05:17)  Wt(kg): --    Output     OUT:    Nephrostomy Tube (mL): 720 mL    Nephrostomy Tube (mL): 570 mL  Total OUT: 1290 mL    Total NET: -1290 mL      OUT:    Nephrostomy Tube (mL): 150 mL  Total OUT: 150 mL    Total NET: -150 mL          Gen: NAD  Abd: soft, nontender, nondistended  : b/l PCN's in place draining dark maroon urine    Labs      06-16 @ 03:10    WBC 7.42  / Hct 26.3  / SCr 0.88     06-15 @ 06:53    WBC 6.63  / Hct 24.5  / SCr 0.98         Culture - Blood (collected 06-12-25 @ 18:07)  Source: Blood Blood  Preliminary Report (06-15-25 @ 22:01):    No growth at 72 Hours    Culture - Blood (collected 06-12-25 @ 17:52)  Source: Blood Blood  Preliminary Report (06-15-25 @ 22:01):    No growth at 72 Hours    Urinalysis with Rflx Culture (collected 06-10-25 @ 09:50)    Urinalysis with Rflx Culture (collected 06-10-25 @ 08:00)    Culture - Urine (collected 06-10-25 @ 08:00)  Source: Urine  Preliminary Report (06-15-25 @ 20:28):    >100,000 CFU/ml Enterobacter cloacae complex    50,000 - 99,000 CFU/mL Pseudomonas aeruginosa        Urine Cx: ?  Blood Cx: ?    Imaging

## 2025-06-16 NOTE — PROVIDER CONTACT NOTE (CRITICAL VALUE NOTIFICATION) - BACKGROUND
Pt admitted for nephritis
Pt admitted with nephritis. Dark red output from nephrostomy tubes with clots.
Patient admitted with tubulointerstitial nephritis.  PMH of HIV, HLD, prostate cancer, HTN, anal cancer
Pt admitted with nephritis. Dark red output from nephrostomy tubes with clots.

## 2025-06-16 NOTE — PROGRESS NOTE ADULT - SUBJECTIVE AND OBJECTIVE BOX
Amos Pollock MD  PGY 2 Department of Internal Medicine        Patient is a 59y old  Male who presents with a chief complaint of UTI w/ bilateral nephrostomy tubes (15 Al 2025 12:08)      SUBJECTIVE / OVERNIGHT EVENTS: Pt seen and examined. No acute overnight events. Denies fevers, chills, CP, SOB, Abdominal pain, N/V, Constipation, Diarrhea        MEDICATIONS  (STANDING):  bictegravir 50 mG/emtricitabine 200 mG/tenofovir alafenamide 25 mG (BIKTARVY) 1 Tablet(s) Oral daily  bisacodyl 5 milliGRAM(s) Oral every 12 hours  buPROPion XL (24-Hour) . 150 milliGRAM(s) Oral daily  cefepime   IVPB      cefepime   IVPB 2000 milliGRAM(s) IV Intermittent every 12 hours  cholecalciferol 2000 Unit(s) Oral daily  diltiazem    milliGRAM(s) Oral daily  ezetimibe 10 milliGRAM(s) Oral at bedtime  metoprolol tartrate 50 milliGRAM(s) Oral every 12 hours  multivitamin 1 Tablet(s) Oral daily  polyethylene glycol 3350 17 Gram(s) Oral two times a day  sodium chloride 0.9%. 1000 milliLiter(s) (100 mL/Hr) IV Continuous <Continuous>  traZODone 50 milliGRAM(s) Oral at bedtime    MEDICATIONS  (PRN):  acetaminophen     Tablet .. 650 milliGRAM(s) Oral every 6 hours PRN Temp greater or equal to 38C (100.4F), Mild Pain (1 - 3)  clonazePAM  Tablet 1 milliGRAM(s) Oral two times a day PRN for anxiety  HYDROmorphone  Injectable 0.5 milliGRAM(s) IV Push every 4 hours PRN Severe Pain (7 - 10)  melatonin 3 milliGRAM(s) Oral at bedtime PRN Insomnia  oxybutynin 5 milliGRAM(s) Oral three times a day PRN Bladder spasms  oxyCODONE    IR 5 milliGRAM(s) Oral every 4 hours PRN Moderate Pain (4 - 6)  zolpidem 5 milliGRAM(s) Oral at bedtime PRN Insomnia  zolpidem 5 milliGRAM(s) Oral at bedtime PRN Insomnia      I&O's Summary    15 Al 2025 07:01  -  16 Jun 2025 07:00  --------------------------------------------------------  IN: 1020 mL / OUT: 1290 mL / NET: -270 mL        Vital Signs Last 24 Hrs  T(C): 37.1 (16 Jun 2025 05:17), Max: 37.4 (15 Al 2025 21:32)  T(F): 98.8 (16 Jun 2025 05:17), Max: 99.3 (15 Al 2025 21:32)  HR: 82 (16 Jun 2025 05:17) (82 - 113)  BP: 108/83 (16 Jun 2025 05:17) (101/70 - 137/87)  BP(mean): --  RR: 18 (16 Jun 2025 05:17) (17 - 18)  SpO2: 97% (16 Jun 2025 05:17) (97% - 100%)    Parameters below as of 16 Jun 2025 05:17  Patient On (Oxygen Delivery Method): room air        CAPILLARY BLOOD GLUCOSE          PHYSICAL EXAM:  GENERAL: NAD,   HEAD:  Atraumatic, Normocephalic  EYES: EOMI, PERRL, conjunctiva and sclera clear  NECK: No JVD  CHEST/LUNG: Clear to auscultation bilaterally; No wheeze  HEART: Regular rate and rhythm; No murmurs, rubs, or gallops  ABDOMEN: Soft, Nontender, Nondistended; Bowel sounds present  EXTREMITIES:  2+ Peripheral Pulses, No clubbing, cyanosis, or edema  PSYCH: AAOx3  NEUROLOGY: non-focal  SKIN: No rashes or lesions       LABS:                        8.9    7.42  )-----------( 312      ( 16 Jun 2025 03:10 )             26.3     Auto Eosinophil # x     / Auto Eosinophil % x     / Auto Neutrophil # x     / Auto Neutrophil % x     / BANDS % x                            8.1    6.63  )-----------( 310      ( 15 Al 2025 06:53 )             24.5     Auto Eosinophil # x     / Auto Eosinophil % x     / Auto Neutrophil # x     / Auto Neutrophil % x     / BANDS % x                            9.0    8.78  )-----------( 307      ( 14 Jun 2025 17:57 )             27.2     Auto Eosinophil # x     / Auto Eosinophil % x     / Auto Neutrophil # x     / Auto Neutrophil % x     / BANDS % x        06-16    139  |  104  |  18  ----------------------------<  110[H]  3.8   |  23  |  0.88  06-15    139  |  104  |  24[H]  ----------------------------<  111[H]  3.6   |  23  |  0.98    Ca    8.5      16 Jun 2025 03:10  Mg     2.00     06-16  Phos  2.7     06-16  TPro  5.9[L]  /  Alb  3.4  /  TBili  0.2  /  DBili  x   /  AST  19  /  ALT  33  /  AlkPhos  71  06-16    PT/INR - ( 16 Jun 2025 03:10 )   PT: 12.5 sec;   INR: 1.05 ratio         PTT - ( 16 Jun 2025 03:10 )  PTT:26.6 sec      Urinalysis Basic - ( 16 Jun 2025 03:10 )    Color: x / Appearance: x / SG: x / pH: x  Gluc: 110 mg/dL / Ketone: x  / Bili: x / Urobili: x   Blood: x / Protein: x / Nitrite: x   Leuk Esterase: x / RBC: x / WBC x   Sq Epi: x / Non Sq Epi: x / Bacteria: x            RADIOLOGY & ADDITIONAL TESTS:    Imaging Personally Reviewed:    Consultant(s) Notes Reviewed:      Care Discussed with Consultants/Other Providers:   Amos Pollock MD  PGY 2 Department of Internal Medicine        Patient is a 59y old  Male who presents with a chief complaint of UTI w/ bilateral nephrostomy tubes (15 Al 2025 12:08)      SUBJECTIVE / OVERNIGHT EVENTS: Pt seen and examined. Yesterday received 1u prbc. No overnight events. This morning continues to have diffuse lower abd pain radiating toward bilateral flanks, 7/10 and similar to prior. Continues to have bloody output from PCNU. No other complaints.       MEDICATIONS  (STANDING):  bictegravir 50 mG/emtricitabine 200 mG/tenofovir alafenamide 25 mG (BIKTARVY) 1 Tablet(s) Oral daily  bisacodyl 5 milliGRAM(s) Oral every 12 hours  buPROPion XL (24-Hour) . 150 milliGRAM(s) Oral daily  cefepime   IVPB      cefepime   IVPB 2000 milliGRAM(s) IV Intermittent every 12 hours  cholecalciferol 2000 Unit(s) Oral daily  diltiazem    milliGRAM(s) Oral daily  ezetimibe 10 milliGRAM(s) Oral at bedtime  metoprolol tartrate 50 milliGRAM(s) Oral every 12 hours  multivitamin 1 Tablet(s) Oral daily  polyethylene glycol 3350 17 Gram(s) Oral two times a day  sodium chloride 0.9%. 1000 milliLiter(s) (100 mL/Hr) IV Continuous <Continuous>  traZODone 50 milliGRAM(s) Oral at bedtime    MEDICATIONS  (PRN):  acetaminophen     Tablet .. 650 milliGRAM(s) Oral every 6 hours PRN Temp greater or equal to 38C (100.4F), Mild Pain (1 - 3)  clonazePAM  Tablet 1 milliGRAM(s) Oral two times a day PRN for anxiety  HYDROmorphone  Injectable 0.5 milliGRAM(s) IV Push every 4 hours PRN Severe Pain (7 - 10)  melatonin 3 milliGRAM(s) Oral at bedtime PRN Insomnia  oxybutynin 5 milliGRAM(s) Oral three times a day PRN Bladder spasms  oxyCODONE    IR 5 milliGRAM(s) Oral every 4 hours PRN Moderate Pain (4 - 6)  zolpidem 5 milliGRAM(s) Oral at bedtime PRN Insomnia  zolpidem 5 milliGRAM(s) Oral at bedtime PRN Insomnia      I&O's Summary    15 Al 2025 07:01  -  16 Jun 2025 07:00  --------------------------------------------------------  IN: 1020 mL / OUT: 1290 mL / NET: -270 mL        Vital Signs Last 24 Hrs  T(C): 37.1 (16 Jun 2025 05:17), Max: 37.4 (15 Al 2025 21:32)  T(F): 98.8 (16 Jun 2025 05:17), Max: 99.3 (15 Al 2025 21:32)  HR: 82 (16 Jun 2025 05:17) (82 - 113)  BP: 108/83 (16 Jun 2025 05:17) (101/70 - 137/87)  BP(mean): --  RR: 18 (16 Jun 2025 05:17) (17 - 18)  SpO2: 97% (16 Jun 2025 05:17) (97% - 100%)    Parameters below as of 16 Jun 2025 05:17  Patient On (Oxygen Delivery Method): room air        CAPILLARY BLOOD GLUCOSE          PHYSICAL EXAM:  GENERAL: NAD, lying in bed comfortably  HEAD:  Atraumatic, Normocephalic  EYES: EOMI, conjunctiva and sclera clear  ENT: Moist mucous membranes  CHEST/LUNG: Clear to auscultation bilaterally; No rales, rhonchi, wheezing, or rubs. Unlabored respirations  HEART: Regular rate and rhythm; No murmurs, rubs, or gallops  ABDOMEN: Soft, ostomy present near umbilicus, tender to moderate palpation near stoma, nondistended, bilateral port wine colored nephroureterostomy tube output with clots in line  EXTREMITIES:  No clubbing, cyanosis, or edema  NERVOUS SYSTEM:  A&Ox3, no focal deficits   SKIN: No rashes or lesions       LABS:                        8.9    7.42  )-----------( 312      ( 16 Jun 2025 03:10 )             26.3     Auto Eosinophil # x     / Auto Eosinophil % x     / Auto Neutrophil # x     / Auto Neutrophil % x     / BANDS % x                            8.1    6.63  )-----------( 310      ( 15 Al 2025 06:53 )             24.5     Auto Eosinophil # x     / Auto Eosinophil % x     / Auto Neutrophil # x     / Auto Neutrophil % x     / BANDS % x                            9.0    8.78  )-----------( 307      ( 14 Jun 2025 17:57 )             27.2     Auto Eosinophil # x     / Auto Eosinophil % x     / Auto Neutrophil # x     / Auto Neutrophil % x     / BANDS % x        06-16    139  |  104  |  18  ----------------------------<  110[H]  3.8   |  23  |  0.88  06-15    139  |  104  |  24[H]  ----------------------------<  111[H]  3.6   |  23  |  0.98    Ca    8.5      16 Jun 2025 03:10  Mg     2.00     06-16  Phos  2.7     06-16  TPro  5.9[L]  /  Alb  3.4  /  TBili  0.2  /  DBili  x   /  AST  19  /  ALT  33  /  AlkPhos  71  06-16    PT/INR - ( 16 Jun 2025 03:10 )   PT: 12.5 sec;   INR: 1.05 ratio         PTT - ( 16 Jun 2025 03:10 )  PTT:26.6 sec      Urinalysis Basic - ( 16 Jun 2025 03:10 )    Color: x / Appearance: x / SG: x / pH: x  Gluc: 110 mg/dL / Ketone: x  / Bili: x / Urobili: x   Blood: x / Protein: x / Nitrite: x   Leuk Esterase: x / RBC: x / WBC x   Sq Epi: x / Non Sq Epi: x / Bacteria: x            RADIOLOGY & ADDITIONAL TESTS:    Imaging Personally Reviewed:    Consultant(s) Notes Reviewed:      Care Discussed with Consultants/Other Providers:

## 2025-06-16 NOTE — PROGRESS NOTE ADULT - SUBJECTIVE AND OBJECTIVE BOX
CC: F/U for UTI    Saw/spoke to patient. No new complaints.    Allergies  No Known Allergies    ANTIMICROBIALS:  bictegravir 50 mG/emtricitabine 200 mG/tenofovir alafenamide 25 mG (BIKTARVY) 1 daily  cefepime   IVPB    cefepime   IVPB 2000 every 12 hours    PE:    Vital Signs Last 24 Hrs  T(C): 36.4 (2025 13:25), Max: 37.4 (15 Al 2025 21:32)  T(F): 97.5 (2025 13:25), Max: 99.3 (15 Al 2025 21:32)  HR: 95 (2025 13:25) (82 - 102)  BP: 124/91 (2025 13:25) (108/83 - 137/87)  RR: 17 (2025 13:25) (17 - 18)  SpO2: 95% (2025 13:25) (95% - 100%)    Gen: AOx3, NAD, non-toxic  CV: Nontachycardic  Resp: Breathing comfortably, RA  Abd: Soft, nontender  IV/Skin: No thrombophlebitis    LABS:                        8.9    7.42  )-----------( 312      ( 2025 03:10 )             26.3     06-16    139  |  104  |  18  ----------------------------<  110[H]  3.8   |  23  |  0.88    Ca    8.5      2025 03:10  Phos  2.7     06-16  Mg     2.00     06-16    TPro  5.9[L]  /  Alb  3.4  /  TBili  0.2  /  DBili  x   /  AST  19  /  ALT  33  /  AlkPhos  71  06-16    Urinalysis Basic - ( 2025 03:10 )    Color: x / Appearance: x / SG: x / pH: x  Gluc: 110 mg/dL / Ketone: x  / Bili: x / Urobili: x   Blood: x / Protein: x / Nitrite: x   Leuk Esterase: x / RBC: x / WBC x   Sq Epi: x / Non Sq Epi: x / Bacteria: x    MICROBIOLOGY:    Blood Blood  25   No growth at 72 Hours  --  --    Blood Blood  25   No growth at 72 Hours  --  --    Urine  06-10-25   >100,000 CFU/ml Enterobacter cloacae complex  50,000 - 99,000 CFU/mL Pseudomonas aeruginosa  --  --    HIV-1 RNA Quantitative, Viral Load Log: NOT DET. lg /mL (05-10-25 @ 02:15)  HIV-1 RNA Quantitative, Viral Load Log: NOT DET. lg /mL (24 @ 19:52)  HIV-1 RNA Quantitative, Viral Load Lo.5 (24 @ 11:25)    RADIOLOGY:    6/10 CT:    IMPRESSION:  Bilateral percutaneous nephroureteral tubes with distal tips coiled in   the bladder. No hydronephrosis.

## 2025-06-16 NOTE — PROGRESS NOTE ADULT - PROBLEM SELECTOR PLAN 4
Sinus tach to 130s on 6/12 ekg w/ sinus tach. Likely iso pain vs. bleed vs. worsening sepsis +/- reflex from holding Metoprolol.     - Encourage home Klonopin use per patient usual schedule to avoid mild withdrawal symptoms  - Pain management and infection control   - plan per hematuria.   - Restarted BB as Lopressor 50mg BID  - Cont home diltiazem 240mg daily On diltiazem 240mg and metoprolol [id number] at home. Follows with cardiology but has no documented tachyarrhythmia history. Not septic during this admission.    - Continue home diltiazem 240 mg  - Continue home BB as Lopressor 50mg BID  - inpatient BP goals: SBP <140 (given hematuria)

## 2025-06-16 NOTE — PROGRESS NOTE ADULT - ATTENDING COMMENTS
59M w/ hx of prostate + anal cancer (s/p chemotherapy + radiation in 2020 c/b bilateral nephrostomy tubes and ostomy), HIV on Biktarvy, HTN, HLD, anxiety/depression presenting for bilateral flank pain and hematuria found to have positive UA and hematuria on PCUN.     #Sepsis 2/2 pyelonephritis. ID cs, cont cefepime. fu urine culture >100K GNR. BCX Neg.    #Hematuria: urology consulted, recommends pcun to pcn conversion. IR consulted, plans for 6/16.    #hx of secondary syphilis, prior treatment with IM Bicillin (multiple treatment), fu with ID re RPR +, but confirmatory test neg, will f/u ID given +rare spirochetes in Derm Punch biopsy done outpt 6/3 per chart review.     # HTN c/w BP meds and trend     # HIV: c/w ART    # Dvt ppx: scds

## 2025-06-16 NOTE — PROVIDER CONTACT NOTE (CRITICAL VALUE NOTIFICATION) - ASSESSMENT
Patient assessed. No signs or symptoms of acute distress noted.
Pt currently in IR but whilst on 9N Pt AOx4, VSS. Dark bloody output from BL nephrostomy tubes. Team already aware.
Pt currently in IR. While on 9N, pt AOx4. VSS. Pain managed with PRN medications.
Pt currently in IR. While on 9N, pt AOx4. VSS. Pain managed with PRN medications.

## 2025-06-16 NOTE — PROGRESS NOTE ADULT - PROBLEM SELECTOR PLAN 6
- Continue home Ezetimibe  - Consider outpatient Lipid panel and Lp(a) assessment with cardiology - Continue home Buproprion  - Continue home PRN clonazepam  - Continue home Ambien as PRN

## 2025-06-16 NOTE — CHART NOTE - NSCHARTNOTEFT_GEN_A_CORE
PRE-INTERVENTIONAL RADIOLOGY PROCEDURE NOTE  ============================  Lizbeth Ly MD  Internal Medicine, PGY-1  ============================  Patient Name: CARL ROWLEY    Patient Age: 59y    Patient Gender: Male    Procedure: Pelvic angiogram    Diagnosis/Indication: possible pseudoaneurysm     Interventional Radiology Attending Physician: Dr Pagan    Ordering Attending Physician: Colette Singh    Pertinent Medical History:  PAST MEDICAL & SURGICAL HISTORY:  HTN (hypertension)      HIV infection      Anxiety      History of anal cancer      History of prostate cancer      HLD (hyperlipidemia)      Parastomal hernia with obstruction and without gangrene      S/P colostomy      Nephrostomy present           NPO: Ate at noon   Antibiotics: cefepime, doxy  Anticoagulation: no  Adverse events to anesthesia: no  Objection to blood products:  no      Allergies: Allergies    No Known Allergies    Intolerances        Physical Exam:     Vitals:Vital Signs Last 24 Hrs  T(C): 36.4 (16 Jun 2025 13:25), Max: 37.4 (15 Al 2025 21:32)  T(F): 97.5 (16 Jun 2025 13:25), Max: 99.3 (15 Al 2025 21:32)  HR: 123 (16 Jun 2025 13:55) (82 - 123)  BP: 124/91 (16 Jun 2025 13:25) (108/83 - 137/87)  BP(mean): --  RR: 17 (16 Jun 2025 13:25) (17 - 18)  SpO2: 95% (16 Jun 2025 13:25) (95% - 100%)    Parameters below as of 16 Jun 2025 13:25  Patient On (Oxygen Delivery Method): room air      Pertinent Labs:                         8.9    7.42  )-----------( 312      ( 16 Jun 2025 03:10 )             26.3     06-16    139  |  104  |  18  ----------------------------<  110[H]  3.8   |  23  |  0.88    Ca    8.5      16 Jun 2025 03:10  Phos  2.7     06-16  Mg     2.00     06-16    TPro  5.9[L]  /  Alb  3.4  /  TBili  0.2  /  DBili  x   /  AST  19  /  ALT  33  /  AlkPhos  71  06-16    PT/INR - ( 16 Jun 2025 03:10 )   PT: 12.5 sec;   INR: 1.05 ratio         PTT - ( 16 Jun 2025 03:10 )  PTT:26.6 sec    Patient and Family Aware ? Yes  Informed consent obtained. All questions and concerns have been addressed at this time.

## 2025-06-16 NOTE — PROGRESS NOTE ADULT - PROBLEM SELECTOR PLAN 1
UA positive with bilateral nephrostomy site pain consistent with complex UTI without clinical or radiographic signs of pyelonephritis. Has a history of both sensitive and carbapenem-resistant pseudomonas. During hospital course, worsening tachycardia and pain with white count elevating. Also has history of secondary syphillis which was treated ~1 year ago. RPR negative.    - Zosyn (6/10PM -6/12), Cefepime 2g Q12 (6/12-  - UCx w/ >100k GNR; drawn from Nephrostomy tube  - Call micro lab if not speciated yet by 6/16  - ID following, appreciate recs  - f/u negative RPR with ID UA positive with bilateral nephrostomy site pain consistent with complex UTI without clinical or radiographic signs of pyelonephritis. Has a history of both sensitive and carbapenem-resistant pseudomonas. During hospital course, worsening tachycardia and pain with white count elevating.     - Zosyn (6/10PM -6/12), Cefepime 2g Q12 (6/12-  - UCx w/ >100k GNR; pending speciation, drawn from Nephrostomy tube  - ID following, appreciate recs  - standing tylenol for pain, oxycodone and IV dilaudid prn for moderate/severe pain

## 2025-06-16 NOTE — PROGRESS NOTE ADULT - PROBLEM SELECTOR PLAN 2
Dark colored in bilateral nephrostomy tubes. 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 Increased hematuria on 6/11 in R bag with clots. Per Urology nikia due to friable  tissue. Confirmed clot in bladder on 6/13 KUB US.    - IR plan to convert nephroureterostomy tubes to nephrostomy tubes on 6/16 to reduce bladder irritation and allow bladder bleeding to tamponade off  - transfuse 1 unit pRBC on 6/15 given slow active bleed  - if H&H drops suddenly -> urgent CTA A&P arterial/venous phase eval for bleed and call IR   - Maintain active T&S  - transfuse PRN hgb <7 or for active bleeding  - BP regulation (SBP < 140) Dark colored in bilateral nephrostomy tubes. 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 Increased hematuria on 6/11 in R bag with clots. Per Urology nikia due to friable  tissue. Confirmed clot in bladder on 6/13 KUB US.    - IR plan to convert nephroureterostomy tubes to nephrostomy tubes on 6/16 to reduce bladder irritation and allow bladder bleeding to tamponade off  - if H&H drops suddenly -> urgent CTA A&P arterial/venous phase eval for bleed and call IR   - Maintain active T&S  - transfuse PRN hgb <7 or for active bleeding  - BP regulation (SBP < 140)

## 2025-06-16 NOTE — PROGRESS NOTE ADULT - PROBLEM SELECTOR PLAN 5
- Continue home Buproprion  - Continue home PRN clonazepam  - Continue home Ambien as PRN Sinus tach to 130s on 6/12 ekg w/ sinus tach. Likely iso pain vs. bleed vs. worsening sepsis +/- reflex from holding Metoprolol.     - Encourage home Klonopin use per patient usual schedule to avoid mild withdrawal symptoms  - Pain management and infection control   - plan per hematuria.   - Restarted BB as Lopressor 50mg BID  - Cont home diltiazem 240mg daily

## 2025-06-16 NOTE — PROGRESS NOTE ADULT - ASSESSMENT
59M w/ hx of prostate + anal cancer (s/p chemotherapy + radiation in 2020 c/b bilateral nephrostomy tubes and ostomy), HIV on Biktarvy, HTN, HLD, anxiety/depression presenting for bilateral flank pain and hematuria found to have positive UA and normal functioning nephrostomy tubes per urology suggestive of UTI. Transitioned from Zosyn to cefepime per ID recommendations given history of pseudomonas. Course complicated by port wine colored hematuria into bilateral L>R nephrourterostomy bags. Originally planned for CBI by urology but unable to place davis due to strictures. KUB ultrasound showing clots in bladder so urology recommending IR exchange nephroureterostomy tubes on  with standard nephrostomy tubes to allow likely bleed to tamponade.

## 2025-06-16 NOTE — PROGRESS NOTE ADULT - PROBLEM SELECTOR PLAN 3
On diltiazem 240mg and metoprolol [id number] at home. Follows with cardiology but has no documented tachyarrhythmia history. Not septic during this admission.    - Continue home diltiazem 240 mg  - Continue home BB as Lopressor 50mg BID  - inpatient BP goals: SBP <140 (given hematuria) has history of secondary syphillis which was dx ~5 years ago and treated, also treated ~1 year ago. Syphilis AB+ but confirmatory negative  - syphilis AB+ but negative confirmatory. Of note pt reports having skin biopsy several weeks ago with derm -> showed rare spirochete   -ID following appreciate recs

## 2025-06-16 NOTE — CHART NOTE - NSCHARTNOTEFT_GEN_A_CORE
MICU ACCEPT NOTE    CHIEF COMPLAINT: Patient is a 59y old  Male who presents with a chief complaint of UTI w/ bilateral nephrostomy tubes (16 Jun 2025 08:26)      HPI:  59M w/ hx of prostate + anal cancer (s/p chemotherapy + radiation in 2020 c/b bilateral nephrostomy tubes and ostomy), HIV on Biktarvy, HTN, HLD, anxiety/depression presenting for bilateral flank pain and hematuria.  Patient reports that he had a abdominal hernia mesh last month in May around his ostomy site. For the last two weeks he has had pain that started at the stoma which then progressed to Right then left bilateral flank pain at his nephrostomy tube insertion sites. Patient came to the ED when he developed bloody output into both nephrostomy tubes (at baseline is normal yellow urine color) and pain was no longer managable with Tylenol. Patient denies any fevers chills, weight loss, SOB, URI symptoms, leg swelling.  Patient follows with urologist Dr. Mathew, oncologist Dr. Andres Funez, and a surgeon.   Of note, patient was admitted  one month prior for poor nephrostomy drainage and urosepsis. At baseline he has no urine output through the penis but he will urinate through the penis if one or more tubes become blocked.    ED Course:  Afebrile and vs unremarkable on RA. Seen by urology who felt flow was appropriate and recommended medical management. CBC showing hgb 12.5. CMP wnl. UA sent from both Left (first sample per ED noted) and Right nephrostomy tubes with high SG, moderate-Large blood, +leuk esterase, >50 WBC, and few bacteria. CT A/p showing bilateral nephroureteral tubes in place with decompressed bladder. Was given 1g ceftriaxone, Ofirmev, and morphine. (10 Al 2025 14:10)      PAST MEDICAL & SURGICAL HISTORY:  HTN (hypertension)      HIV infection      Anxiety      History of anal cancer      History of prostate cancer      HLD (hyperlipidemia)      Parastomal hernia with obstruction and without gangrene      S/P colostomy      Nephrostomy present          FAMILY HISTORY:  FH: prostate cancer    FH: breast cancer        SOCIAL HISTORY:  Smoking:   Substance Use:   EtOH Use:   Advance Directives:     MEDICATIONS  (STANDING):  acetaminophen     Tablet .. 975 milliGRAM(s) Oral every 8 hours  bictegravir 50 mG/emtricitabine 200 mG/tenofovir alafenamide 25 mG (BIKTARVY) 1 Tablet(s) Oral daily  bisacodyl 5 milliGRAM(s) Oral every 12 hours  buPROPion XL (24-Hour) . 150 milliGRAM(s) Oral daily  cefepime   IVPB      cefepime   IVPB 2000 milliGRAM(s) IV Intermittent every 12 hours  cholecalciferol 2000 Unit(s) Oral daily  diltiazem    milliGRAM(s) Oral daily  doxycycline monohydrate Capsule 100 milliGRAM(s) Oral every 12 hours  ezetimibe 10 milliGRAM(s) Oral at bedtime  lactated ringers Bolus 1000 milliLiter(s) IV Bolus once  metoprolol tartrate 50 milliGRAM(s) Oral every 12 hours  multivitamin 1 Tablet(s) Oral daily  polyethylene glycol 3350 17 Gram(s) Oral two times a day  sodium chloride 0.9%. 1000 milliLiter(s) (100 mL/Hr) IV Continuous <Continuous>  traZODone 50 milliGRAM(s) Oral at bedtime    MEDICATIONS  (PRN):  clonazePAM  Tablet 1 milliGRAM(s) Oral two times a day PRN for anxiety  HYDROmorphone  Injectable 0.5 milliGRAM(s) IV Push every 4 hours PRN Severe Pain (7 - 10)  melatonin 3 milliGRAM(s) Oral at bedtime PRN Insomnia  oxybutynin 5 milliGRAM(s) Oral three times a day PRN Bladder spasms  oxyCODONE    IR 5 milliGRAM(s) Oral every 4 hours PRN Moderate Pain (4 - 6)  zolpidem 5 milliGRAM(s) Oral at bedtime PRN Insomnia  zolpidem 5 milliGRAM(s) Oral at bedtime PRN Insomnia      Allergies    No Known Allergies    Intolerances        REVIEW OF SYSTEMS:  CONSTITUTIONAL: No weakness, fevers or chills  EYES/ENT: No visual changes;  No vertigo or throat pain   NECK: No pain or stiffness  RESPIRATORY: No cough, wheezing, hemoptysis; No shortness of breath  CARDIOVASCULAR: No chest pain or palpitations  GASTROINTESTINAL: No abdominal or epigastric pain. No nausea, vomiting, or hematemesis; No diarrhea or constipation. No melena or hematochezia.  GENITOURINARY: No dysuria, frequency or hematuria  NEUROLOGICAL: No numbness or weakness  SKIN: No itching, rashes  [ ] All other systems negative  [ ] Unable to assess ROS because ________    OBJECTIVE:  ICU Vital Signs Last 24 Hrs  T(C): 37 (16 Jun 2025 17:10), Max: 37.4 (15 Al 2025 21:32)  T(F): 98.6 (16 Jun 2025 17:10), Max: 99.3 (15 Al 2025 21:32)  HR: 109 (16 Jun 2025 17:45) (82 - 123)  BP: 125/82 (16 Jun 2025 17:45) (108/83 - 137/87)  BP(mean): --  ABP: --  ABP(mean): --  RR: 18 (16 Jun 2025 17:45) (17 - 20)  SpO2: 99% (16 Jun 2025 17:45) (95% - 100%)    O2 Parameters below as of 16 Jun 2025 17:45  Patient On (Oxygen Delivery Method): room air              06-15 @ 07:01  -  06-16 @ 07:00  --------------------------------------------------------  IN: 1020 mL / OUT: 1290 mL / NET: -270 mL    06-16 @ 07:01  -  06-16 @ 18:03  --------------------------------------------------------  IN: 0 mL / OUT: 670 mL / NET: -670 mL      CAPILLARY BLOOD GLUCOSE      POCT Blood Glucose.: 273 mg/dL (16 Jun 2025 13:53)      PHYSICAL EXAM:  GENERAL: NAD, lying in bed comfortably  HEAD:  Atraumatic, normocephalic  EYES: EOMI, PERRLA, conjunctiva and sclera clear  ENT: Moist mucous membranes  NECK: Supple, no JVD  HEART: S1, S2, regular rate and rhythm, no murmurs, rubs, or gallops  LUNGS: Unlabored respirations.  Clear to auscultation bilaterally, no crackles, wheezing, or rhonchi  ABDOMEN: Soft, nontender, nondistended, +BS  EXTREMITIES: 2+ peripheral pulses bilaterally. No clubbing, cyanosis, or edema  NERVOUS SYSTEM:  A&Ox3, no focal deficits   SKIN: No rashes or lesions  LINES:     LABS:                        9.2    15.75 )-----------( 324      ( 16 Jun 2025 17:04 )             27.1     Hgb Trend: 9.2<--, 9.2<--, 8.9<--, 8.1<--, 9.0<--  06-16    146[H]  |  128[H]  |  8   ----------------------------<  99  1.7[LL]   |  8[LL]  |  0.44[L]    Ca    3.1[LL]      16 Jun 2025 14:13  Phos  2.5     06-16  Mg     2.00     06-16    TPro  2.3[L]  /  Alb  1.2[L]  /  TBili  <0.2  /  DBili  x   /  AST  12  /  ALT  <5  /  AlkPhos  26[L]  06-16    Creatinine Trend: 0.44<--, 0.88<--, 0.98<--, 1.00<--, 1.11<--, 1.00<--  PT/INR - ( 16 Jun 2025 03:10 )   PT: 12.5 sec;   INR: 1.05 ratio         PTT - ( 16 Jun 2025 03:10 )  PTT:26.6 sec  Urinalysis Basic - ( 16 Jun 2025 14:13 )    Color: x / Appearance: x / SG: x / pH: x  Gluc: 99 mg/dL / Ketone: x  / Bili: x / Urobili: x   Blood: x / Protein: x / Nitrite: x   Leuk Esterase: x / RBC: x / WBC x   Sq Epi: x / Non Sq Epi: x / Bacteria: x      Arterial Blood Gas:  06-16 @ 17:04  7.36/45/<20/25/26.4/-0.2  ABG lactate: --    Venous Blood Gas:  06-16 @ 15:11  7.24/55/<20/24/27.0  VBG Lactate: 4.8  Venous Blood Gas:  06-16 @ 14:13  7.25/19/<20/8/54.2  VBG Lactate: 2.6      MICROBIOLOGY:     RADIOLOGY & ADDITIONAL TESTS:    ASSESSMENT  JW ROWLEY is a 59y male with PMH prostate + anal cancer (s/p chemotherapy + radiation in 2020 c/b bilateral nephrostomy tubes and ostomy), HIV on Biktarvy, HTN, HLD, anxiety/depression in the hospital for 6d transferred to the MICU after IR exhange of nephroureterostomy tubes to nephrostomy tubes on 6/16 for monitoring.    PLAN  =====Neurologic=====  Patient is AOx4  Anxiety and depression.   - Continue home Buproprion  - Continue home PRN clonazepam  - Continue home Ambien as PRN.    =====Pulmonary=====  Patient breathing comfortably on room air    =====Cardiovascular=====  Patient does not currently require vasopressors and is normotensive    #HTN (hypertension).   - On diltiazem 240mg and metoprolol [id number] at home. Follows with cardiology but has no documented tachyarrhythmia history. Not septic during this admission.  - Continue home diltiazem 240 mg  - Continue home BB as Lopressor 50mg BID  - inpatient BP goals: SBP <140 (given hematuria).    #Sinus tachycardia.   - Sinus tach to 130s on 6/12 ekg w/ sinus tach. Likely iso pain vs. bleed vs. worsening sepsis +/- reflex from holding Metoprolol.   - Encourage home Klonopin use per patient usual schedule to avoid mild withdrawal symptoms  - Pain management and infection control   - plan per hematuria.   - Restarted BB as Lopressor 50mg BID  - Cont home diltiazem 240mg daily.    #HLD (hyperlipidemia).   - Continue home Ezetimibe  - Consider outpatient Lipid panel and Lp(a) assessment with cardiology.    =====GI=====  #Diet  - Full diet    =====Renal/=====  #Acute UTI.   - UA positive with bilateral nephrostomy site pain consistent with complex UTI without clinical or radiographic signs of pyelonephritis. Has a history of both sensitive and carbapenem-resistant pseudomonas. During hospital course, worsening tachycardia and pain with white count elevating.   - Zosyn (6/10PM -6/12), Cefepime 2g Q12 (6/12-  - UCx w/ >100k GNR; pending speciation, drawn from Nephrostomy tube  - ID following, appreciate recs  - standing tylenol for pain, oxycodone and IV dilaudid prn for moderate/severe pain.    #Hematuria.   - Dark colored in bilateral nephrostomy tubes. 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 Increased hematuria on 6/11 in R bag with clots. Per Urology nikia due to friable  tissue. Confirmed clot in bladder on 6/13 KUB US.  - IR converted nephroureterostomy tubes to nephrostomy tubes on 6/16 to reduce bladder irritation and allow bladder bleeding to tamponade off  - if H&H drops suddenly -> urgent CTA A&P arterial/venous phase eval for bleed and call IR   - Maintain active T&S  - transfuse PRN hgb <7 or for active bleeding  - BP regulation (SBP < 140).    #PCN Placement  - Monitor CBC q6h  - Transfuse PRN    #Electrolytes  - Maintain K>4, Phos>3, Mag>2, iCal>1    =====Endocrine=====  NA    =====Infectious Disease=====  #Syphilis, secondary.   - history of secondary syphillis which was dx ~5 years ago and treated, also treated ~1 year ago. Syphilis AB+ but confirmatory negative  - syphilis AB+ but negative confirmatory. Of note pt reports having skin biopsy several weeks ago with derm -> showed rare spirochete   - ID following appreciate recs.    #HIV on Biktarvy (CD4 624 in 12/2024 and VL UD 5/25)  - Start Doxycycline 100mg q 12, plan for 14 days (for outpatient finding of spirochetes on skin BX)  - C/w Biktarvy, HIV well controlled   - F/u UCx  - F/U BCXs      =====Heme/Onc=====  #History of Prostate and Anal cancer.   - S/p chemotherapy + radiation in 2020 c/b bilateral nephrostomy tubes and ostomy. In remission. Follows with Dr. Andres Funez (oncology)  - Follow up outpatient with oncology given no active concerns.    #DVT ppx  - SCD given hematuria    =====Ethics=====  FULL CODE MICU ACCEPT NOTE    CHIEF COMPLAINT: Patient is a 59y old  Male who presents with a chief complaint of UTI w/ bilateral nephrostomy tubes (16 Jun 2025 08:26)      HPI:  59M w/ hx of prostate + anal cancer (s/p chemotherapy + radiation in 2020 c/b bilateral nephrostomy tubes and ostomy), HIV on Biktarvy, HTN, HLD, anxiety/depression presenting for bilateral flank pain and hematuria.  Patient reports that he had a abdominal hernia mesh last month in May around his ostomy site. For the last two weeks he has had pain that started at the stoma which then progressed to Right then left bilateral flank pain at his nephrostomy tube insertion sites. Patient came to the ED when he developed bloody output into both nephrostomy tubes (at baseline is normal yellow urine color) and pain was no longer managable with Tylenol. Patient denies any fevers chills, weight loss, SOB, URI symptoms, leg swelling.  Patient follows with urologist Dr. Mathew, oncologist Dr. Andres Funez, and a surgeon.   Of note, patient was admitted  one month prior for poor nephrostomy drainage and urosepsis. At baseline he has no urine output through the penis but he will urinate through the penis if one or more tubes become blocked.    ED Course:  Afebrile and vs unremarkable on RA. Seen by urology who felt flow was appropriate and recommended medical management. CBC showing hgb 12.5. CMP wnl. UA sent from both Left (first sample per ED noted) and Right nephrostomy tubes with high SG, moderate-Large blood, +leuk esterase, >50 WBC, and few bacteria. CT A/p showing bilateral nephroureteral tubes in place with decompressed bladder. Was given 1g ceftriaxone, Ofirmev, and morphine. (10 Al 2025 14:10)    MICU course:   Did not require pressors. Underwent cystoscopy, dilation, clot evacuation in the OR 6/17 with Urology. Plan for IR nephrostomy exchange on 6/19 with IR    [ ] Confirm with IR nephrostomy exchange on 6/19   [ ] Monitor Hgb     PAST MEDICAL & SURGICAL HISTORY:  HTN (hypertension)      HIV infection      Anxiety      History of anal cancer      History of prostate cancer      HLD (hyperlipidemia)      Parastomal hernia with obstruction and without gangrene      S/P colostomy      Nephrostomy present          FAMILY HISTORY:  FH: prostate cancer    FH: breast cancer        SOCIAL HISTORY:  Smoking:   Substance Use:   EtOH Use:   Advance Directives:     MEDICATIONS  (STANDING):  acetaminophen     Tablet .. 975 milliGRAM(s) Oral every 8 hours  bictegravir 50 mG/emtricitabine 200 mG/tenofovir alafenamide 25 mG (BIKTARVY) 1 Tablet(s) Oral daily  bisacodyl 5 milliGRAM(s) Oral every 12 hours  buPROPion XL (24-Hour) . 150 milliGRAM(s) Oral daily  cefepime   IVPB      cefepime   IVPB 2000 milliGRAM(s) IV Intermittent every 12 hours  cholecalciferol 2000 Unit(s) Oral daily  diltiazem    milliGRAM(s) Oral daily  doxycycline monohydrate Capsule 100 milliGRAM(s) Oral every 12 hours  ezetimibe 10 milliGRAM(s) Oral at bedtime  lactated ringers Bolus 1000 milliLiter(s) IV Bolus once  metoprolol tartrate 50 milliGRAM(s) Oral every 12 hours  multivitamin 1 Tablet(s) Oral daily  polyethylene glycol 3350 17 Gram(s) Oral two times a day  sodium chloride 0.9%. 1000 milliLiter(s) (100 mL/Hr) IV Continuous <Continuous>  traZODone 50 milliGRAM(s) Oral at bedtime    MEDICATIONS  (PRN):  clonazePAM  Tablet 1 milliGRAM(s) Oral two times a day PRN for anxiety  HYDROmorphone  Injectable 0.5 milliGRAM(s) IV Push every 4 hours PRN Severe Pain (7 - 10)  melatonin 3 milliGRAM(s) Oral at bedtime PRN Insomnia  oxybutynin 5 milliGRAM(s) Oral three times a day PRN Bladder spasms  oxyCODONE    IR 5 milliGRAM(s) Oral every 4 hours PRN Moderate Pain (4 - 6)  zolpidem 5 milliGRAM(s) Oral at bedtime PRN Insomnia  zolpidem 5 milliGRAM(s) Oral at bedtime PRN Insomnia      Allergies    No Known Allergies    Intolerances        REVIEW OF SYSTEMS:  CONSTITUTIONAL: No weakness, fevers or chills  EYES/ENT: No visual changes;  No vertigo or throat pain   NECK: No pain or stiffness  RESPIRATORY: No cough, wheezing, hemoptysis; No shortness of breath  CARDIOVASCULAR: No chest pain or palpitations  GASTROINTESTINAL: No abdominal or epigastric pain. No nausea, vomiting, or hematemesis; No diarrhea or constipation. No melena or hematochezia.  GENITOURINARY: No dysuria, frequency or hematuria  NEUROLOGICAL: No numbness or weakness  SKIN: No itching, rashes  [ ] All other systems negative  [ ] Unable to assess ROS because ________    OBJECTIVE:  ICU Vital Signs Last 24 Hrs  T(C): 37 (16 Jun 2025 17:10), Max: 37.4 (15 Al 2025 21:32)  T(F): 98.6 (16 Jun 2025 17:10), Max: 99.3 (15 Al 2025 21:32)  HR: 109 (16 Jun 2025 17:45) (82 - 123)  BP: 125/82 (16 Jun 2025 17:45) (108/83 - 137/87)  BP(mean): --  ABP: --  ABP(mean): --  RR: 18 (16 Jun 2025 17:45) (17 - 20)  SpO2: 99% (16 Jun 2025 17:45) (95% - 100%)    O2 Parameters below as of 16 Jun 2025 17:45  Patient On (Oxygen Delivery Method): room air              06-15 @ 07:01  -  06-16 @ 07:00  --------------------------------------------------------  IN: 1020 mL / OUT: 1290 mL / NET: -270 mL    06-16 @ 07:01  -  06-16 @ 18:03  --------------------------------------------------------  IN: 0 mL / OUT: 670 mL / NET: -670 mL      CAPILLARY BLOOD GLUCOSE      POCT Blood Glucose.: 273 mg/dL (16 Jun 2025 13:53)      PHYSICAL EXAM:  GENERAL: NAD, lying in bed comfortably  HEAD:  Atraumatic, normocephalic  EYES: EOMI, PERRLA, conjunctiva and sclera clear  ENT: Moist mucous membranes  NECK: Supple, no JVD  HEART: S1, S2, regular rate and rhythm, no murmurs, rubs, or gallops  LUNGS: Unlabored respirations.  Clear to auscultation bilaterally, no crackles, wheezing, or rhonchi  ABDOMEN: Soft, nontender, nondistended, +BS  EXTREMITIES: 2+ peripheral pulses bilaterally. No clubbing, cyanosis, or edema  NERVOUS SYSTEM:  A&Ox3, no focal deficits   SKIN: No rashes or lesions  LINES:     LABS:                        9.2    15.75 )-----------( 324      ( 16 Jun 2025 17:04 )             27.1     Hgb Trend: 9.2<--, 9.2<--, 8.9<--, 8.1<--, 9.0<--  06-16    146[H]  |  128[H]  |  8   ----------------------------<  99  1.7[LL]   |  8[LL]  |  0.44[L]    Ca    3.1[LL]      16 Jun 2025 14:13  Phos  2.5     06-16  Mg     2.00     06-16    TPro  2.3[L]  /  Alb  1.2[L]  /  TBili  <0.2  /  DBili  x   /  AST  12  /  ALT  <5  /  AlkPhos  26[L]  06-16    Creatinine Trend: 0.44<--, 0.88<--, 0.98<--, 1.00<--, 1.11<--, 1.00<--  PT/INR - ( 16 Jun 2025 03:10 )   PT: 12.5 sec;   INR: 1.05 ratio         PTT - ( 16 Jun 2025 03:10 )  PTT:26.6 sec  Urinalysis Basic - ( 16 Jun 2025 14:13 )    Color: x / Appearance: x / SG: x / pH: x  Gluc: 99 mg/dL / Ketone: x  / Bili: x / Urobili: x   Blood: x / Protein: x / Nitrite: x   Leuk Esterase: x / RBC: x / WBC x   Sq Epi: x / Non Sq Epi: x / Bacteria: x      Arterial Blood Gas:  06-16 @ 17:04  7.36/45/<20/25/26.4/-0.2  ABG lactate: --    Venous Blood Gas:  06-16 @ 15:11  7.24/55/<20/24/27.0  VBG Lactate: 4.8  Venous Blood Gas:  06-16 @ 14:13  7.25/19/<20/8/54.2  VBG Lactate: 2.6      MICROBIOLOGY:     RADIOLOGY & ADDITIONAL TESTS:    ASSESSMENT  JW ROWLEY is a 59y male with PMH prostate + anal cancer (s/p chemotherapy + radiation in 2020 c/b bilateral nephrostomy tubes and ostomy), HIV on Biktarvy, HTN, HLD, anxiety/depression in the hospital for 6d transferred to the MICU after IR exhange of nephroureterostomy tubes to nephrostomy tubes on 6/16 for monitoring.    PLAN  =====Neurologic=====  Patient is AOx4  Anxiety and depression.   - Continue home Buproprion  - Continue home PRN clonazepam  - Continue home Ambien as PRN.    =====Pulmonary=====  Patient breathing comfortably on room air    =====Cardiovascular=====  Patient does not currently require vasopressors and is normotensive    #HTN (hypertension).   - On diltiazem 240mg and metoprolol [id number] at home. Follows with cardiology but has no documented tachyarrhythmia history. Not septic during this admission.  - Continue home diltiazem 240 mg  - Continue home BB as Lopressor 50mg BID  - inpatient BP goals: SBP <140 (given hematuria).    #Sinus tachycardia.   - Sinus tach to 130s on 6/12 ekg w/ sinus tach. Likely iso pain vs. bleed vs. worsening sepsis +/- reflex from holding Metoprolol.   - Encourage home Klonopin use per patient usual schedule to avoid mild withdrawal symptoms  - Pain management and infection control   - plan per hematuria.   - Restarted BB as Lopressor 50mg BID  - Cont home diltiazem 240mg daily.    #HLD (hyperlipidemia).   - Continue home Ezetimibe  - Consider outpatient Lipid panel and Lp(a) assessment with cardiology.    =====GI=====  #Diet  - Full diet    =====Renal/=====  #Acute UTI.   - UA positive with bilateral nephrostomy site pain consistent with complex UTI without clinical or radiographic signs of pyelonephritis. Has a history of both sensitive and carbapenem-resistant pseudomonas. During hospital course, worsening tachycardia and pain with white count elevating.   - Zosyn (6/10PM -6/12), Cefepime 2g Q12 (6/12-  - UCx w/ >100k GNR; pending speciation, drawn from Nephrostomy tube  - ID following, appreciate recs  - standing tylenol for pain, oxycodone and IV dilaudid prn for moderate/severe pain.    #Hematuria.   - Dark colored in bilateral nephrostomy tubes. 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 Increased hematuria on 6/11 in R bag with clots. Per Urology nikia due to friable  tissue. Confirmed clot in bladder on 6/13 KUB US.  - IR converted nephroureterostomy tubes to nephrostomy tubes on 6/16 to reduce bladder irritation and allow bladder bleeding to tamponade off  - if H&H drops suddenly -> urgent CTA A&P arterial/venous phase eval for bleed and call IR   - Maintain active T&S  - transfuse PRN hgb <7 or for active bleeding  - BP regulation (SBP < 140).    #PCN Placement  - Monitor CBC q6h  - Transfuse PRN    #Electrolytes  - Maintain K>4, Phos>3, Mag>2, iCal>1    =====Endocrine=====  NA    =====Infectious Disease=====  #Syphilis, secondary.   - history of secondary syphillis which was dx ~5 years ago and treated, also treated ~1 year ago. Syphilis AB+ but confirmatory negative  - syphilis AB+ but negative confirmatory. Of note pt reports having skin biopsy several weeks ago with derm -> showed rare spirochete   - ID following appreciate recs.    #HIV on Biktarvy (CD4 624 in 12/2024 and VL UD 5/25)  - Start Doxycycline 100mg q 12, plan for 14 days (for outpatient finding of spirochetes on skin BX)  - C/w Biktarvy, HIV well controlled   - F/u UCx  - F/U BCXs      =====Heme/Onc=====  #History of Prostate and Anal cancer.   - S/p chemotherapy + radiation in 2020 c/b bilateral nephrostomy tubes and ostomy. In remission. Follows with Dr. Andres Funez (oncology)  - Follow up outpatient with oncology given no active concerns.    #DVT ppx  - SCD given hematuria    =====Ethics=====  FULL CODE MICU ACCEPT NOTE    CHIEF COMPLAINT: Patient is a 59y old  Male who presents with a chief complaint of UTI w/ bilateral nephrostomy tubes (16 Jun 2025 08:26)      HPI:  59M w/ hx of prostate + anal cancer (s/p chemotherapy + radiation in 2020 c/b bilateral nephrostomy tubes and ostomy), HIV on Biktarvy, HTN, HLD, anxiety/depression presenting for bilateral flank pain and hematuria.  Patient reports that he had a abdominal hernia mesh last month in May around his ostomy site. For the last two weeks he has had pain that started at the stoma which then progressed to Right then left bilateral flank pain at his nephrostomy tube insertion sites. Patient came to the ED when he developed bloody output into both nephrostomy tubes (at baseline is normal yellow urine color) and pain was no longer managable with Tylenol. Patient denies any fevers chills, weight loss, SOB, URI symptoms, leg swelling.  Patient follows with urologist Dr. Mathew, oncologist Dr. Andres Funez, and a surgeon.   Of note, patient was admitted  one month prior for poor nephrostomy drainage and urosepsis. At baseline he has no urine output through the penis but he will urinate through the penis if one or more tubes become blocked.    ED Course:  Afebrile and vs unremarkable on RA. Seen by urology who felt flow was appropriate and recommended medical management. CBC showing hgb 12.5. CMP wnl. UA sent from both Left (first sample per ED noted) and Right nephrostomy tubes with high SG, moderate-Large blood, +leuk esterase, >50 WBC, and few bacteria. CT A/p showing bilateral nephroureteral tubes in place with decompressed bladder. Was given 1g ceftriaxone, Ofirmev, and morphine. (10 Al 2025 14:10)    MICU course:   Did not require pressors. Underwent cystoscopy, dilation, clot evacuation in the OR 6/17 with Urology. Plan for IR nephrostomy exchange on 6/19 with IR    [ ] Confirm with IR nephrostomy exchange on 6/19   [ ] Monitor Hgb     PAST MEDICAL & SURGICAL HISTORY:  HTN (hypertension)      HIV infection      Anxiety      History of anal cancer      History of prostate cancer      HLD (hyperlipidemia)      Parastomal hernia with obstruction and without gangrene      S/P colostomy      Nephrostomy present          FAMILY HISTORY:  FH: prostate cancer    FH: breast cancer        SOCIAL HISTORY:  Smoking:   Substance Use:   EtOH Use:   Advance Directives:     MEDICATIONS  (STANDING):  acetaminophen     Tablet .. 975 milliGRAM(s) Oral every 8 hours  bictegravir 50 mG/emtricitabine 200 mG/tenofovir alafenamide 25 mG (BIKTARVY) 1 Tablet(s) Oral daily  bisacodyl 5 milliGRAM(s) Oral every 12 hours  buPROPion XL (24-Hour) . 150 milliGRAM(s) Oral daily  cefepime   IVPB      cefepime   IVPB 2000 milliGRAM(s) IV Intermittent every 12 hours  cholecalciferol 2000 Unit(s) Oral daily  diltiazem    milliGRAM(s) Oral daily  doxycycline monohydrate Capsule 100 milliGRAM(s) Oral every 12 hours  ezetimibe 10 milliGRAM(s) Oral at bedtime  lactated ringers Bolus 1000 milliLiter(s) IV Bolus once  metoprolol tartrate 50 milliGRAM(s) Oral every 12 hours  multivitamin 1 Tablet(s) Oral daily  polyethylene glycol 3350 17 Gram(s) Oral two times a day  sodium chloride 0.9%. 1000 milliLiter(s) (100 mL/Hr) IV Continuous <Continuous>  traZODone 50 milliGRAM(s) Oral at bedtime    MEDICATIONS  (PRN):  clonazePAM  Tablet 1 milliGRAM(s) Oral two times a day PRN for anxiety  HYDROmorphone  Injectable 0.5 milliGRAM(s) IV Push every 4 hours PRN Severe Pain (7 - 10)  melatonin 3 milliGRAM(s) Oral at bedtime PRN Insomnia  oxybutynin 5 milliGRAM(s) Oral three times a day PRN Bladder spasms  oxyCODONE    IR 5 milliGRAM(s) Oral every 4 hours PRN Moderate Pain (4 - 6)  zolpidem 5 milliGRAM(s) Oral at bedtime PRN Insomnia  zolpidem 5 milliGRAM(s) Oral at bedtime PRN Insomnia      Allergies    No Known Allergies    Intolerances        REVIEW OF SYSTEMS:  CONSTITUTIONAL: No weakness, fevers or chills  EYES/ENT: No visual changes;  No vertigo or throat pain   NECK: No pain or stiffness  RESPIRATORY: No cough, wheezing, hemoptysis; No shortness of breath  CARDIOVASCULAR: No chest pain or palpitations  GASTROINTESTINAL: No abdominal or epigastric pain. No nausea, vomiting, or hematemesis; No diarrhea or constipation. No melena or hematochezia.  GENITOURINARY: No dysuria, frequency or hematuria  NEUROLOGICAL: No numbness or weakness  SKIN: No itching, rashes  [ ] All other systems negative  [ ] Unable to assess ROS because ________    OBJECTIVE:  ICU Vital Signs Last 24 Hrs  T(C): 37 (16 Jun 2025 17:10), Max: 37.4 (15 Al 2025 21:32)  T(F): 98.6 (16 Jun 2025 17:10), Max: 99.3 (15 Al 2025 21:32)  HR: 109 (16 Jun 2025 17:45) (82 - 123)  BP: 125/82 (16 Jun 2025 17:45) (108/83 - 137/87)  BP(mean): --  ABP: --  ABP(mean): --  RR: 18 (16 Jun 2025 17:45) (17 - 20)  SpO2: 99% (16 Jun 2025 17:45) (95% - 100%)    O2 Parameters below as of 16 Jun 2025 17:45  Patient On (Oxygen Delivery Method): room air              06-15 @ 07:01  -  06-16 @ 07:00  --------------------------------------------------------  IN: 1020 mL / OUT: 1290 mL / NET: -270 mL    06-16 @ 07:01  -  06-16 @ 18:03  --------------------------------------------------------  IN: 0 mL / OUT: 670 mL / NET: -670 mL      CAPILLARY BLOOD GLUCOSE      POCT Blood Glucose.: 273 mg/dL (16 Jun 2025 13:53)      PHYSICAL EXAM:  GENERAL: NAD, lying in bed comfortably  HEAD:  Atraumatic, normocephalic  EYES: EOMI, PERRLA, conjunctiva and sclera clear  ENT: Moist mucous membranes  NECK: Supple, no JVD  HEART: S1, S2, regular rate and rhythm, no murmurs, rubs, or gallops  LUNGS: Unlabored respirations.  Clear to auscultation bilaterally, no crackles, wheezing, or rhonchi  ABDOMEN: Soft, nontender, nondistended, +BS  EXTREMITIES: 2+ peripheral pulses bilaterally. No clubbing, cyanosis, or edema  NERVOUS SYSTEM:  A&Ox3, no focal deficits   SKIN: No rashes or lesions  LINES:     LABS:                        9.2    15.75 )-----------( 324      ( 16 Jun 2025 17:04 )             27.1     Hgb Trend: 9.2<--, 9.2<--, 8.9<--, 8.1<--, 9.0<--  06-16    146[H]  |  128[H]  |  8   ----------------------------<  99  1.7[LL]   |  8[LL]  |  0.44[L]    Ca    3.1[LL]      16 Jun 2025 14:13  Phos  2.5     06-16  Mg     2.00     06-16    TPro  2.3[L]  /  Alb  1.2[L]  /  TBili  <0.2  /  DBili  x   /  AST  12  /  ALT  <5  /  AlkPhos  26[L]  06-16    Creatinine Trend: 0.44<--, 0.88<--, 0.98<--, 1.00<--, 1.11<--, 1.00<--  PT/INR - ( 16 Jun 2025 03:10 )   PT: 12.5 sec;   INR: 1.05 ratio         PTT - ( 16 Jun 2025 03:10 )  PTT:26.6 sec  Urinalysis Basic - ( 16 Jun 2025 14:13 )    Color: x / Appearance: x / SG: x / pH: x  Gluc: 99 mg/dL / Ketone: x  / Bili: x / Urobili: x   Blood: x / Protein: x / Nitrite: x   Leuk Esterase: x / RBC: x / WBC x   Sq Epi: x / Non Sq Epi: x / Bacteria: x      Arterial Blood Gas:  06-16 @ 17:04  7.36/45/<20/25/26.4/-0.2  ABG lactate: --    Venous Blood Gas:  06-16 @ 15:11  7.24/55/<20/24/27.0  VBG Lactate: 4.8  Venous Blood Gas:  06-16 @ 14:13  7.25/19/<20/8/54.2  VBG Lactate: 2.6      MICROBIOLOGY:     RADIOLOGY & ADDITIONAL TESTS:    ASSESSMENT  JW ROWLEY is a 59y male with PMH prostate + anal cancer (s/p chemotherapy + radiation in 2020 c/b bilateral nephrostomy tubes and ostomy), HIV on Biktarvy, HTN, HLD, anxiety/depression in the hospital for 6d transferred to the MICU after IR exhange of nephroureterostomy tubes to nephrostomy tubes on 6/16 for monitoring.    PLAN  =====Neurologic=====  Patient is AOx4  Anxiety and depression.   - Continue home Buproprion  - Continue home PRN clonazepam  - Continue home Ambien as PRN.    =====Pulmonary=====  Patient breathing comfortably on room air    =====Cardiovascular=====  Patient does not currently require vasopressors and is normotensive    #HTN (hypertension).   - On diltiazem 240mg and metoprolol [id number] at home. Follows with cardiology but has no documented tachyarrhythmia history. Not septic during this admission.  - Continue home diltiazem 240 mg  - Continue home BB as Lopressor 50mg BID  - inpatient BP goals: SBP <140 (given hematuria).    #Sinus tachycardia.   - Sinus tach to 130s on 6/12 ekg w/ sinus tach. Likely iso pain vs. bleed vs. worsening sepsis +/- reflex from holding Metoprolol.   - Encourage home Klonopin use per patient usual schedule to avoid mild withdrawal symptoms  - Pain management and infection control   - plan per hematuria.   - Restarted BB as Lopressor 50mg BID  - Cont home diltiazem 240mg daily.    #HLD (hyperlipidemia).   - Continue home Ezetimibe  - Consider outpatient Lipid panel and Lp(a) assessment with cardiology.    =====GI=====  #Diet  - Full diet    =====Renal/=====  #Acute UTI.   - UA positive with bilateral nephrostomy site pain consistent with complex UTI without clinical or radiographic signs of pyelonephritis. Has a history of both sensitive and carbapenem-resistant pseudomonas. During hospital course, worsening tachycardia and pain with white count elevating.   - Zosyn (6/10PM -6/12), Cefepime 2g Q12 (6/12-  - UCx w/ >100k GNR; pending speciation, drawn from Nephrostomy tube  - ID following, appreciate recs  - standing tylenol for pain, oxycodone and IV dilaudid prn for moderate/severe pain.    #Hematuria.   - Dark colored in bilateral nephrostomy tubes. 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 Increased hematuria on 6/11 in R bag with clots. Per Urology nikia due to friable  tissue. Confirmed clot in bladder on 6/13 KUB US.  - IR converted nephroureterostomy tubes to nephrostomy tubes on 6/16 to reduce bladder irritation and allow bladder bleeding to tamponade off  - if H&H drops suddenly -> urgent CTA A&P arterial/venous phase eval for bleed and call IR   - Maintain active T&S  - transfuse PRN hgb <7 or for active bleeding  - BP regulation (SBP < 140).    #PCN Placement  - Monitor CBC q6h  - Transfuse PRN    #Electrolytes  - Maintain K>4, Phos>3, Mag>2, iCal>1    =====Endocrine=====  NA    =====Infectious Disease=====  #Syphilis, secondary.   - history of secondary syphillis which was dx ~5 years ago and treated, also treated ~1 year ago. Syphilis AB+ but confirmatory negative  - syphilis AB+ but negative confirmatory. Of note pt reports having skin biopsy several weeks ago with derm -> showed rare spirochete   - ID following appreciate recs.    #HIV on Biktarvy (CD4 624 in 12/2024 and VL UD 5/25)  - Start Doxycycline 100mg q 12, plan for 14 days (for outpatient finding of spirochetes on skin BX)  - C/w Biktarvy, HIV well controlled   - F/u UCx  - F/U BCXs      =====Heme/Onc=====  #History of Prostate and Anal cancer.   - S/p chemotherapy + radiation in 2020 c/b bilateral nephrostomy tubes and ostomy. In remission. Follows with Dr. Andres Funez (oncology)  - Follow up outpatient with oncology given no active concerns.    #DVT ppx  - SCD given hematuria    =====Ethics=====  FULL CODE    Attending Attestation:    Patient seen and examined with resident/fellow.  Agree with above except as noted.  58 yo male, HIV+, h/o prostate and rectal carcinoma s/p bilateral uretal strictures requiring bilateral nephrostomy tubes. Pt had R nephrostomy tube exchange. After exchange   Pt had hematuria from R nephrostomy tube. RRt called. No hypotension but pt has lactic acid 4.8. No evidence shock or end organ damage.   Pt also c/o severe r back pain abd pain an penile pain.    PE: Pt lying in bed in NAD  HEENT Sclera anicteric:  Neck.; No JVD  Lungs clear to A  Cor s1 S2  Abd. Mild epigastric and RLL, RUQ pain. soft. No rebound.  Ext: cce    A/P  1. Hematuria from R nephrostomy tube. Increase lactic acidosis unclear etiology. No evidence of hypotension.     Admit to MICU     Trend Lactic acid q 4- 6 hrs.     Follow HB q 4 -6 hrs. Transfuse for HB < 7    2. ID  HIV +. Continue HARRT medication          Syphilis: Continue doxycycline.           Continue cefepime for possible UTI from R nephrostomy tube  before today's exchange.    3. DVT prophylaxis: SCD    4. PAIN Dilaudid 0.5 -1mg IV prn q 4 hrs.    5. GOC; Full code     This patient is critically ill and required frequent bedside visits with therapy change.  Total critical care time spent [id number]. Excludes teaching and procedures.     Jackson Wisdom MD

## 2025-06-16 NOTE — PROVIDER CONTACT NOTE (CRITICAL VALUE NOTIFICATION) - SITUATION
K 1.7, Calcium 3.1, co2 8
K 1.3, Chloride 124, Calcium 0.59, unable to obtain glucose or hct, hgb less than 4, co2 9
syphilis screen +
Pt with lactate 4.8

## 2025-06-16 NOTE — PROGRESS NOTE ADULT - ASSESSMENT
60 yo M w/ PMHx of HIV on Biktarvy, HTN, prostate cancer and anal cancer s/p chemotherapy/radiation c/b b/l nephrostomy tubes (exchanged on 5/9 by IR) and ostomy presents for a few days of bilateral lower back/flank pain and bloody urine/passage of clots since 6 PM 6/9/25. Urology consulted for hematuria and bilateral flank pain. Patient is AVSS, WBC 8.3, Hct 36.6, Cr 0.8. UA positive. CTAP demonstrates bilateral nephroureteral tubes in appropriate position with no hydronephrosis bilaterally. Bladder is decompressed and prostate is unremarkable.     Hematuria can sometimes occur in setting of UTI and can be intermittent for as long as nephroureteral stents are in place. Cola colored urine will persist as old clot is lysed in the urine. Bilateral flank pain and burning with urination may be 2/2 cystitis/ ascending UTI and may improve w antibiotics. No intervention currently indicated as patient continues to void appropriately with decompressed bladder, normal H/H, normal Cr, and no hydronephrosis bilaterally    6/13: RBUS shows bladder with clot  6/14: Hct stable 27 from 30, Cr 1 from 1.1, comfortable, NU's draining maroon  6/15: comfortably, NU's draining maroon/brown. 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 IR plans to convert PCNU's to PCNs under local on Monday 6/16  - Ditropan for PRN bladder spasms    D/w Dr. Kiran  Urology  [id number]

## 2025-06-16 NOTE — CHART NOTE - NSCHARTNOTEFT_GEN_A_CORE
POST-OPERATIVE NOTE    Subjective:  Patient is s/p Laparoscopic colostomy. Recovering well. Denies SOB, chest pain/palpitations, nausea or vomting. Abdomen is soft and appropriately tender around the surgical incisions. Wound dressings are clean and dry. Colostomy has no air or stool.   .  Vital Signs Last 24 Hrs  T(C): 36.5 (30 Mar 2022 20:40), Max: 37.3 (30 Mar 2022 06:02)  T(F): 97.7 (30 Mar 2022 20:40), Max: 99.2 (30 Mar 2022 06:02)  HR: 111 (30 Mar 2022 20:40) (95 - 113)  BP: 156/118 (30 Mar 2022 20:40) (122/97 - 186/91)  BP(mean): 121 (30 Mar 2022 19:00) (104 - 129)  RR: 18 (30 Mar 2022 20:40) (12 - 39)  SpO2: 98% (30 Mar 2022 20:40) (94% - 100%)  I&O's Detail    29 Mar 2022 07:01  -  30 Mar 2022 07:00  --------------------------------------------------------  IN:    Oral Fluid: 920 mL  Total IN: 920 mL    OUT:    Voided (mL): 625 mL  Total OUT: 625 mL    Total NET: 295 mL      30 Mar 2022 07:01  -  30 Mar 2022 23:05  --------------------------------------------------------  IN:  Total IN: 0 mL    OUT:    Voided (mL): 420 mL  Total OUT: 420 mL    Total NET: -420 mL        bictegravir 50 mG/emtricitabine 200 mG/tenofovir alafenamide 25 mG (BIKTARVY) 1  piperacillin/tazobactam IVPB.. 3.375  bictegravir 50 mG/emtricitabine 200 mG/tenofovir alafenamide 25 mG (BIKTARVY) 1  metoprolol succinate ER 50  piperacillin/tazobactam IVPB.. 3.375    PAST MEDICAL & SURGICAL HISTORY:  HTN (hypertension)    HLD (hyperlipidemia)    HIV infection    Depressive disorder    Anxiety    Prostate cancer    Anal cancer    Diverticulosis    Anal lesion          Physical Exam:  General: NAD, resting comfortably in bed  Pulmonary: Nonlabored breathing, no respiratory distress  Cardiovascular: NSR  Abdominal: soft, NT/ND  Extremities: WWP      LABS:                        11.0   9.23  )-----------( 387      ( 30 Mar 2022 03:52 )             34.3     03-30    139  |  102  |  10  ----------------------------<  117<H>  3.9   |  26  |  0.78    Ca    9.2      30 Mar 2022 03:52  Phos  3.7     03-30  Mg     2.10     03-30      PT/INR - ( 30 Mar 2022 03:52 )   PT: 15.0 sec;   INR: 1.29 ratio         PTT - ( 30 Mar 2022 03:52 )  PTT:31.9 sec  CAPILLARY BLOOD GLUCOSE          Radiology and Additional Studies:    Assessment:  The patient is a 56y Male who is now several hours post-op from a Laparoscopic colostomy:    Plan:  - Pain control as needed  - DVT ppx  - OOB and ambulating as tolerated  - F/u AM labs Refill approved as requested.

## 2025-06-16 NOTE — PROVIDER CONTACT NOTE (CRITICAL VALUE NOTIFICATION) - ACTION/TREATMENT ORDERED:
No new orders at this time.
MD made aware. No new interventions ordered at this time.
No new orders at this time.
No new orders at this time

## 2025-06-16 NOTE — PROGRESS NOTE ADULT - ATTENDING COMMENTS
Agree with above  Pt seen after IR procedure. R NU exchanged to NT. Before being able to do left, patient diaphoretic and hypotensive with blood per urethra  Hgb 9.2.   Pt still tachycardic with suprapubic and right flank pain. MICU consulted  Discussed with IR. No role for angio at present given contrast still within vasculature  Likely renal bleed given clot within renal collecting system  IR - plan for likely angio tomorrow. Will do overnight if unstable

## 2025-06-17 ENCOUNTER — TRANSCRIPTION ENCOUNTER (OUTPATIENT)
Age: 60
End: 2025-06-17

## 2025-06-17 LAB
ALBUMIN SERPL ELPH-MCNC: 3.1 G/DL — LOW (ref 3.3–5)
ALP SERPL-CCNC: 67 U/L — SIGNIFICANT CHANGE UP (ref 40–120)
ALT FLD-CCNC: 24 U/L — SIGNIFICANT CHANGE UP (ref 4–41)
ANION GAP SERPL CALC-SCNC: 10 MMOL/L — SIGNIFICANT CHANGE UP (ref 7–14)
APTT BLD: 26.3 SEC — SIGNIFICANT CHANGE UP (ref 26.1–36.8)
AST SERPL-CCNC: 17 U/L — SIGNIFICANT CHANGE UP (ref 4–40)
BASOPHILS # BLD AUTO: 0.04 K/UL — SIGNIFICANT CHANGE UP (ref 0–0.2)
BASOPHILS # BLD AUTO: 0.04 K/UL — SIGNIFICANT CHANGE UP (ref 0–0.2)
BASOPHILS # BLD AUTO: 0.06 K/UL — SIGNIFICANT CHANGE UP (ref 0–0.2)
BASOPHILS NFR BLD AUTO: 0.5 % — SIGNIFICANT CHANGE UP (ref 0–2)
BASOPHILS NFR BLD AUTO: 0.5 % — SIGNIFICANT CHANGE UP (ref 0–2)
BASOPHILS NFR BLD AUTO: 0.6 % — SIGNIFICANT CHANGE UP (ref 0–2)
BILIRUB SERPL-MCNC: <0.2 MG/DL — SIGNIFICANT CHANGE UP (ref 0.2–1.2)
BUN SERPL-MCNC: 19 MG/DL — SIGNIFICANT CHANGE UP (ref 7–23)
CALCIUM SERPL-MCNC: 8.2 MG/DL — LOW (ref 8.4–10.5)
CHLORIDE SERPL-SCNC: 108 MMOL/L — HIGH (ref 98–107)
CO2 SERPL-SCNC: 19 MMOL/L — LOW (ref 22–31)
CREAT SERPL-MCNC: 1.07 MG/DL — SIGNIFICANT CHANGE UP (ref 0.5–1.3)
CULTURE RESULTS: SIGNIFICANT CHANGE UP
CULTURE RESULTS: SIGNIFICANT CHANGE UP
EGFR: 80 ML/MIN/1.73M2 — SIGNIFICANT CHANGE UP
EGFR: 80 ML/MIN/1.73M2 — SIGNIFICANT CHANGE UP
EOSINOPHIL # BLD AUTO: 0.04 K/UL — SIGNIFICANT CHANGE UP (ref 0–0.5)
EOSINOPHIL # BLD AUTO: 0.04 K/UL — SIGNIFICANT CHANGE UP (ref 0–0.5)
EOSINOPHIL # BLD AUTO: 0.08 K/UL — SIGNIFICANT CHANGE UP (ref 0–0.5)
EOSINOPHIL NFR BLD AUTO: 0.4 % — SIGNIFICANT CHANGE UP (ref 0–6)
EOSINOPHIL NFR BLD AUTO: 0.5 % — SIGNIFICANT CHANGE UP (ref 0–6)
EOSINOPHIL NFR BLD AUTO: 1 % — SIGNIFICANT CHANGE UP (ref 0–6)
GAS PNL BLDV: SIGNIFICANT CHANGE UP
GLUCOSE SERPL-MCNC: 119 MG/DL — HIGH (ref 70–99)
HCT VFR BLD CALC: 22.6 % — LOW (ref 39–50)
HCT VFR BLD CALC: 23 % — LOW (ref 39–50)
HCT VFR BLD CALC: 25.7 % — LOW (ref 39–50)
HGB BLD-MCNC: 7.5 G/DL — LOW (ref 13–17)
HGB BLD-MCNC: 7.7 G/DL — LOW (ref 13–17)
HGB BLD-MCNC: 8.7 G/DL — LOW (ref 13–17)
IANC: 6.59 K/UL — SIGNIFICANT CHANGE UP (ref 1.8–7.4)
IMM GRANULOCYTES # BLD AUTO: 0.18 K/UL — HIGH (ref 0–0.07)
IMM GRANULOCYTES # BLD AUTO: 0.18 K/UL — HIGH (ref 0–0.07)
IMM GRANULOCYTES NFR BLD AUTO: 2.3 % — HIGH (ref 0–0.9)
IMM GRANULOCYTES NFR BLD AUTO: 2.3 % — HIGH (ref 0–0.9)
IMM GRANULOCYTES NFR BLD AUTO: 2.6 % — HIGH (ref 0–0.9)
INR BLD: 1.17 RATIO — HIGH (ref 0.85–1.16)
LYMPHOCYTES # BLD AUTO: 2.31 K/UL — SIGNIFICANT CHANGE UP (ref 1–3.3)
LYMPHOCYTES # BLD AUTO: 2.56 K/UL — SIGNIFICANT CHANGE UP (ref 1–3.3)
LYMPHOCYTES # BLD AUTO: 2.73 K/UL — SIGNIFICANT CHANGE UP (ref 1–3.3)
LYMPHOCYTES # BLD AUTO: 25.2 % — SIGNIFICANT CHANGE UP (ref 13–44)
LYMPHOCYTES NFR BLD AUTO: 30.1 % — SIGNIFICANT CHANGE UP (ref 13–44)
LYMPHOCYTES NFR BLD AUTO: 33.3 % — SIGNIFICANT CHANGE UP (ref 13–44)
MAGNESIUM SERPL-MCNC: 2 MG/DL — SIGNIFICANT CHANGE UP (ref 1.6–2.6)
MCHC RBC-ENTMCNC: 29.9 PG — SIGNIFICANT CHANGE UP (ref 27–34)
MCHC RBC-ENTMCNC: 30 PG — SIGNIFICANT CHANGE UP (ref 27–34)
MCHC RBC-ENTMCNC: 30.1 PG — SIGNIFICANT CHANGE UP (ref 27–34)
MCHC RBC-ENTMCNC: 33.2 G/DL — SIGNIFICANT CHANGE UP (ref 32–36)
MCHC RBC-ENTMCNC: 33.5 G/DL — SIGNIFICANT CHANGE UP (ref 32–36)
MCHC RBC-ENTMCNC: 33.9 G/DL — SIGNIFICANT CHANGE UP (ref 32–36)
MCV RBC AUTO: 88.3 FL — SIGNIFICANT CHANGE UP (ref 80–100)
MCV RBC AUTO: 89.8 FL — SIGNIFICANT CHANGE UP (ref 80–100)
MCV RBC AUTO: 90.4 FL — SIGNIFICANT CHANGE UP (ref 80–100)
MONOCYTES # BLD AUTO: 0.86 K/UL — SIGNIFICANT CHANGE UP (ref 0–0.9)
MONOCYTES # BLD AUTO: 0.92 K/UL — HIGH (ref 0–0.9)
MONOCYTES # BLD AUTO: 1.15 K/UL — HIGH (ref 0–0.9)
MONOCYTES NFR BLD AUTO: 10.6 % — SIGNIFICANT CHANGE UP (ref 2–14)
MONOCYTES NFR BLD AUTO: 11.2 % — SIGNIFICANT CHANGE UP (ref 2–14)
MONOCYTES NFR BLD AUTO: 12 % — SIGNIFICANT CHANGE UP (ref 2–14)
NEUTROPHILS # BLD AUTO: 3.97 K/UL — SIGNIFICANT CHANGE UP (ref 1.8–7.4)
NEUTROPHILS # BLD AUTO: 4.18 K/UL — SIGNIFICANT CHANGE UP (ref 1.8–7.4)
NEUTROPHILS # BLD AUTO: 6.59 K/UL — SIGNIFICANT CHANGE UP (ref 1.8–7.4)
NEUTROPHILS NFR BLD AUTO: 51.7 % — SIGNIFICANT CHANGE UP (ref 43–77)
NEUTROPHILS NFR BLD AUTO: 54.6 % — SIGNIFICANT CHANGE UP (ref 43–77)
NEUTROPHILS NFR BLD AUTO: 60.6 % — SIGNIFICANT CHANGE UP (ref 43–77)
NRBC # BLD AUTO: 0.02 K/UL — HIGH (ref 0–0)
NRBC # BLD AUTO: 0.02 K/UL — HIGH (ref 0–0)
NRBC # BLD AUTO: 0.04 K/UL — HIGH (ref 0–0)
NRBC # FLD: 0.02 K/UL — HIGH (ref 0–0)
NRBC # FLD: 0.02 K/UL — HIGH (ref 0–0)
NRBC # FLD: 0.04 K/UL — HIGH (ref 0–0)
NRBC BLD AUTO-RTO: 0 /100 WBCS — SIGNIFICANT CHANGE UP (ref 0–0)
PHOSPHATE SERPL-MCNC: 2.6 MG/DL — SIGNIFICANT CHANGE UP (ref 2.5–4.5)
PLATELET # BLD AUTO: 285 K/UL — SIGNIFICANT CHANGE UP (ref 150–400)
PLATELET # BLD AUTO: 289 K/UL — SIGNIFICANT CHANGE UP (ref 150–400)
PLATELET # BLD AUTO: 294 K/UL — SIGNIFICANT CHANGE UP (ref 150–400)
PMV BLD: 9.2 FL — SIGNIFICANT CHANGE UP (ref 7–13)
PMV BLD: 9.3 FL — SIGNIFICANT CHANGE UP (ref 7–13)
POTASSIUM SERPL-MCNC: 4.1 MMOL/L — SIGNIFICANT CHANGE UP (ref 3.5–5.3)
POTASSIUM SERPL-SCNC: 4.1 MMOL/L — SIGNIFICANT CHANGE UP (ref 3.5–5.3)
PROT SERPL-MCNC: 5.6 G/DL — LOW (ref 6–8.3)
PROTHROM AB SERPL-ACNC: 13.5 SEC — HIGH (ref 9.9–13.4)
RBC # BLD: 2.5 M/UL — LOW (ref 4.2–5.8)
RBC # BLD: 2.56 M/UL — LOW (ref 4.2–5.8)
RBC # BLD: 2.91 M/UL — LOW (ref 4.2–5.8)
RBC # FLD: 14.6 % — HIGH (ref 10.3–14.5)
RBC # FLD: 15.2 % — HIGH (ref 10.3–14.5)
RBC # FLD: 15.4 % — HIGH (ref 10.3–14.5)
SODIUM SERPL-SCNC: 137 MMOL/L — SIGNIFICANT CHANGE UP (ref 135–145)
SPECIMEN SOURCE: SIGNIFICANT CHANGE UP
SPECIMEN SOURCE: SIGNIFICANT CHANGE UP
WBC # BLD: 10.85 K/UL — HIGH (ref 3.8–10.5)
WBC # BLD: 7.67 K/UL — SIGNIFICANT CHANGE UP (ref 3.8–10.5)
WBC # BLD: 7.69 K/UL — SIGNIFICANT CHANGE UP (ref 3.8–10.5)
WBC # FLD AUTO: 10.85 K/UL — HIGH (ref 3.8–10.5)
WBC # FLD AUTO: 7.67 K/UL — SIGNIFICANT CHANGE UP (ref 3.8–10.5)
WBC # FLD AUTO: 7.69 K/UL — SIGNIFICANT CHANGE UP (ref 3.8–10.5)

## 2025-06-17 PROCEDURE — 52214 CYSTOSCOPY AND TREATMENT: CPT | Mod: 59

## 2025-06-17 PROCEDURE — 99232 SBSQ HOSP IP/OBS MODERATE 35: CPT | Mod: 25,57

## 2025-06-17 PROCEDURE — 99232 SBSQ HOSP IP/OBS MODERATE 35: CPT

## 2025-06-17 PROCEDURE — 99233 SBSQ HOSP IP/OBS HIGH 50: CPT | Mod: GC

## 2025-06-17 PROCEDURE — G0545: CPT

## 2025-06-17 PROCEDURE — 52001 CYSTO W/IRRG&EVAC MLT CLOTS: CPT | Mod: 59

## 2025-06-17 RX ORDER — HYDROMORPHONE/SOD CHLOR,ISO/PF 2 MG/10 ML
1 SYRINGE (ML) INJECTION ONCE
Refills: 0 | Status: DISCONTINUED | OUTPATIENT
Start: 2025-06-17 | End: 2025-06-17

## 2025-06-17 RX ORDER — METOPROLOL SUCCINATE 50 MG/1
2.5 TABLET, EXTENDED RELEASE ORAL EVERY 6 HOURS
Refills: 0 | Status: DISCONTINUED | OUTPATIENT
Start: 2025-06-17 | End: 2025-06-18

## 2025-06-17 RX ORDER — HYDROMORPHONE/SOD CHLOR,ISO/PF 2 MG/10 ML
1 SYRINGE (ML) INJECTION EVERY 4 HOURS
Refills: 0 | Status: DISCONTINUED | OUTPATIENT
Start: 2025-06-17 | End: 2025-06-18

## 2025-06-17 RX ORDER — HYDROMORPHONE/SOD CHLOR,ISO/PF 2 MG/10 ML
0.5 SYRINGE (ML) INJECTION ONCE
Refills: 0 | Status: DISCONTINUED | OUTPATIENT
Start: 2025-06-17 | End: 2025-06-17

## 2025-06-17 RX ADMIN — Medication 0.5 MILLIGRAM(S): at 11:32

## 2025-06-17 RX ADMIN — METOPROLOL SUCCINATE 2.5 MILLIGRAM(S): 50 TABLET, EXTENDED RELEASE ORAL at 23:35

## 2025-06-17 RX ADMIN — OXYBUTYNIN CHLORIDE 5 MILLIGRAM(S): 5 TABLET, FILM COATED, EXTENDED RELEASE ORAL at 22:22

## 2025-06-17 RX ADMIN — Medication 975 MILLIGRAM(S): at 06:38

## 2025-06-17 RX ADMIN — Medication 1 APPLICATION(S): at 11:06

## 2025-06-17 RX ADMIN — METOPROLOL SUCCINATE 2.5 MILLIGRAM(S): 50 TABLET, EXTENDED RELEASE ORAL at 10:46

## 2025-06-17 RX ADMIN — Medication 1 TABLET(S): at 11:07

## 2025-06-17 RX ADMIN — Medication 1 MILLIGRAM(S): at 16:45

## 2025-06-17 RX ADMIN — Medication 1 MILLIGRAM(S): at 23:30

## 2025-06-17 RX ADMIN — Medication 5 MILLIGRAM(S): at 23:38

## 2025-06-17 RX ADMIN — CEFEPIME 100 MILLIGRAM(S): 2 INJECTION, POWDER, FOR SOLUTION INTRAVENOUS at 06:00

## 2025-06-17 RX ADMIN — Medication 50 MILLIGRAM(S): at 22:22

## 2025-06-17 RX ADMIN — Medication 0.5 MILLIGRAM(S): at 08:08

## 2025-06-17 RX ADMIN — Medication 0.5 MILLIGRAM(S): at 10:48

## 2025-06-17 RX ADMIN — OXYBUTYNIN CHLORIDE 5 MILLIGRAM(S): 5 TABLET, FILM COATED, EXTENDED RELEASE ORAL at 05:59

## 2025-06-17 RX ADMIN — CLONAZEPAM 1 MILLIGRAM(S): 0.5 TABLET ORAL at 22:23

## 2025-06-17 RX ADMIN — Medication 100 MILLIGRAM(S): at 17:06

## 2025-06-17 RX ADMIN — Medication 0.5 MILLIGRAM(S): at 10:32

## 2025-06-17 RX ADMIN — Medication 5 MILLIGRAM(S): at 06:00

## 2025-06-17 RX ADMIN — Medication 0.5 MILLIGRAM(S): at 02:42

## 2025-06-17 RX ADMIN — BICTEGRAVIR SODIUM, EMTRICITABINE, AND TENOFOVIR ALAFENAMIDE FUMARATE 1 TABLET(S): 50; 200; 25 TABLET ORAL at 11:06

## 2025-06-17 RX ADMIN — Medication 975 MILLIGRAM(S): at 23:30

## 2025-06-17 RX ADMIN — Medication 1 MILLIGRAM(S): at 17:36

## 2025-06-17 RX ADMIN — Medication 0.5 MILLIGRAM(S): at 11:18

## 2025-06-17 RX ADMIN — Medication 975 MILLIGRAM(S): at 22:22

## 2025-06-17 RX ADMIN — Medication 1 MILLIGRAM(S): at 17:20

## 2025-06-17 RX ADMIN — Medication 1 MILLIGRAM(S): at 16:29

## 2025-06-17 RX ADMIN — Medication 100 MILLIGRAM(S): at 06:00

## 2025-06-17 RX ADMIN — Medication 0.5 MILLIGRAM(S): at 08:31

## 2025-06-17 RX ADMIN — POLYETHYLENE GLYCOL 3350 17 GRAM(S): 17 POWDER, FOR SOLUTION ORAL at 06:00

## 2025-06-17 RX ADMIN — BUPROPION HYDROBROMIDE 150 MILLIGRAM(S): 522 TABLET, EXTENDED RELEASE ORAL at 11:07

## 2025-06-17 RX ADMIN — Medication 0.5 MILLIGRAM(S): at 03:30

## 2025-06-17 RX ADMIN — Medication 1 MILLIGRAM(S): at 22:23

## 2025-06-17 RX ADMIN — EZETIMIBE 10 MILLIGRAM(S): 10 TABLET ORAL at 22:22

## 2025-06-17 RX ADMIN — Medication 975 MILLIGRAM(S): at 06:00

## 2025-06-17 RX ADMIN — OXYCODONE HYDROCHLORIDE 5 MILLIGRAM(S): 30 TABLET ORAL at 05:59

## 2025-06-17 RX ADMIN — Medication 2000 UNIT(S): at 11:07

## 2025-06-17 RX ADMIN — OXYCODONE HYDROCHLORIDE 5 MILLIGRAM(S): 30 TABLET ORAL at 06:38

## 2025-06-17 RX ADMIN — METOPROLOL SUCCINATE 2.5 MILLIGRAM(S): 50 TABLET, EXTENDED RELEASE ORAL at 17:06

## 2025-06-17 NOTE — PROGRESS NOTE ADULT - ASSESSMENT
58 yo M w/ PMHx of HIV on Biktarvy, HTN, prostate cancer and anal cancer s/p chemotherapy/radiation c/b b/l nephrostomy tubes (exchanged on 5/9 by IR) and ostomy presents for a few days of bilateral lower back/flank pain and bloody urine/passage of clots since 6 PM 6/9/25. Urology consulted for hematuria and bilateral flank pain. Patient is AVSS, WBC 8.3, Hct 36.6, Cr 0.8. UA positive. CTAP demonstrates bilateral nephroureteral tubes in appropriate position with no hydronephrosis bilaterally. Bladder is decompressed and prostate is unremarkable.     Hematuria can sometimes occur in setting of UTI and can be intermittent for as long as nephroureteral stents are in place. Cola colored urine will persist as old clot is lysed in the urine. Bilateral flank pain and burning with urination may be 2/2 cystitis/ ascending UTI and may improve w antibiotics. No intervention currently indicated as patient continues to void appropriately with decompressed bladder, normal H/H, normal Cr, and no hydronephrosis bilaterally    6/13: RBUS shows bladder with clot  6/14: Hct stable 27 from 30, Cr 1 from 1.1, comfortable, NU's draining maroon  6/15: comfortably, NU's draining maroon/brown. 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 IR plans to convert PCNU's to PCNs under local on Monday 6/16 -> Patient had exchange of R PCNU to PCN and afterwards patient became diaphoretic and hypotensive with increased bleeding from below. Patient had CTA which did not show any obvious source of bleed but differential included renal bleed from PCN exchange, uretero-ileal fistula, and irritation from exchange. Patient stable after bolus and 1u pRBC and H/H has largely been stable since. Do not suspect a ureteroileal fistula as presentation would be more severe but could still be a renal PSA which is being tamponaded by the PCN. Given that patient still complaining of bladder pain with passage of clots and PCNU on L side unable to be exchanged for PCN making it difficult for bladder to clot off will plan for cysto, dilation, clot evacuation in the OR today. Patient may still benefit from angio to ensure no bleed along the PCN tract  - Please pre-op patient for OR today, #1 on addon list and likely to go early this afternoon  - Will consent patient  - Ditropan for PRN bladder spasms    D/w Dr. Kiran  Urology  [id number]

## 2025-06-17 NOTE — PROGRESS NOTE ADULT - ASSESSMENT
58 y/o M w/ PMhx of HTN, HLD, anxiety/depression, HIV on Biktarvy (CD4 624 in 12/2024 and VL UD 5/25),  h/o anal and prostate Ca (s/p chemotherapy + radiation in 2020 c/b bilateral nephrostomy tubes and ostomy),  ureteral stricture s/p stenting and bow with b/l nephroureteral tubes (last exchanged 5/9), recent parastomal hernia repair with mesh in 4/2025 admitted to [facility] on 6/10 for b/l flank pain, hematuria  Pt afebrile, initially w/o leukocytosis, now 13, UCx w/ GNR  CT A/P with b/l PCT with distal tips coiled in the bladder  D/w outpatient ID doctor Dr. 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

## 2025-06-17 NOTE — PROGRESS NOTE ADULT - ASSESSMENT
Interventional Radiology    HPI: 59y male with PMH prostate + anal cancer (s/p chemotherapy + radiation in 2020 c/b bilateral nephrostomy tubes and ostomy), HIV on Biktarvy, HTN, HLD, anxiety/depression requiring upgrade to MICU after bleeding noted during most recent NephU to Neph exchange 6/16.     Allergies: No Known Allergies    Medications (Abx/Cardiac/Anticoagulation/Blood Products)    bictegravir 50 mG/emtricitabine 200 mG/tenofovir alafenamide 25 mG (BIKTARVY): 1 Tablet(s) Oral (06-17 @ 11:06)  cefepime   IVPB: 100 mL/Hr IV Intermittent (06-17 @ 06:00)  diltiazem   CD: 240 milliGRAM(s) Oral (06-16 @ 05:18)  doxycycline monohydrate Capsule: 100 milliGRAM(s) Oral (06-17 @ 06:00)  metoprolol tartrate: 50 milliGRAM(s) Oral (06-16 @ 05:18)  metoprolol tartrate Injectable: 2.5 milliGRAM(s) IV Push (06-17 @ 10:46)    Data:  152  86.2  T(C): 36.7  HR: 111  BP: 108/89  RR: 15  SpO2: 100%    -WBC 10.85 / HgB 8.7 / Hct 25.7 / Plt 289  -Na 137 / Cl 108 / BUN 19 / Glucose 119  -K 4.1 / CO2 19 / Cr 1.07  -ALT 24 / Alk Phos 67 / T.Bili <0.2  -INR 1.17 / PTT 26.3      Radiology: Reviewed    Assessment/Plan:   59y male with PMH prostate + anal cancer (s/p chemotherapy + radiation in 2020 c/b bilateral nephrostomy tubes and ostomy), HIV on Biktarvy, HTN, HLD, anxiety/depression requiring upgrade to MICU after bleeding noted during most recent NephU to Neph exchange 6/16.     -- IR will plan to perform renal angiogram, possible embolization, nephrostomy tube exchange 6/19.   -- NPO at midnight on 6/18.  -- hold a.m. anticoagulation and obtain AM labs on 6/19.  -- please complete IR pre-procedure note  -- please place IR procedure request order under Dr. Pagan.    --  Dank Laureano, PGY-5  Vascular and Interventional Radiology   Available on [employer] Teams    - Non-emergent consults: Place IR consult order in [place]  - Emergent issues (pager): [facility] [phone]; [facility] [phone]; [phone]  - Scheduling questions: [facility] [phone]; [facility] [phone]  - Clinic/outpatient booking: [facility] [phone]; [facility] [phone]

## 2025-06-17 NOTE — PRE-OP CHECKLIST - ANTIBIOTIC
Patients mother wanted to let us know her EKG was done at [facility] yesterday. She also wanted to try to move apt time up and I let her know MD is booked the rest of the day.   n/a

## 2025-06-17 NOTE — PROGRESS NOTE ADULT - ATTENDING COMMENTS
1. Hematuria R nephrostomy tube, Pt s/p OR with wash out of bladder clots. Increasing serosanguineous fluid bilateral nephrostomy tubes likely from reflux from bladder from wash out. Follow HB s/P procedure.    2. Lactic acidosis resolves. Pt remains normotensive.  3. HIV+. Continue HARRT therapy  4. ?UTI. Continue cefepime.   5.Syphilis. Stop Doxcycline. IV PCN as per ID.  6.DVT prophylaxis: SCD  7. GOC; Full code

## 2025-06-17 NOTE — PROGRESS NOTE ADULT - SUBJECTIVE AND OBJECTIVE BOX
*******************************  Fahad Rivera MD (PGY-1)  Internal Medicine  Contact via [employer] TEAMS    *******************************    INTERVAL HPI/OVERNIGHT EVENTS: No acute events overnight.    SUBJECTIVE: Patient seen and examined at bedside.     MEDICATIONS  (STANDING):  acetaminophen     Tablet .. 975 milliGRAM(s) Oral every 8 hours  bictegravir 50 mG/emtricitabine 200 mG/tenofovir alafenamide 25 mG (BIKTARVY) 1 Tablet(s) Oral daily  bisacodyl 5 milliGRAM(s) Oral every 12 hours  buPROPion XL (24-Hour) . 150 milliGRAM(s) Oral daily  cefepime   IVPB      cefepime   IVPB 2000 milliGRAM(s) IV Intermittent every 12 hours  chlorhexidine 2% Cloths 1 Application(s) Topical daily  cholecalciferol 2000 Unit(s) Oral daily  doxycycline monohydrate Capsule 100 milliGRAM(s) Oral every 12 hours  ezetimibe 10 milliGRAM(s) Oral at bedtime  multivitamin 1 Tablet(s) Oral daily  oxybutynin 5 milliGRAM(s) Oral every 8 hours  polyethylene glycol 3350 17 Gram(s) Oral two times a day  traZODone 50 milliGRAM(s) Oral at bedtime    MEDICATIONS  (PRN):  clonazePAM  Tablet 1 milliGRAM(s) Oral two times a day PRN for anxiety  HYDROmorphone  Injectable 0.5 milliGRAM(s) IV Push every 4 hours PRN Severe Pain (7 - 10)  oxyCODONE    IR 5 milliGRAM(s) Oral every 4 hours PRN Moderate Pain (4 - 6)  zolpidem 5 milliGRAM(s) Oral at bedtime PRN Insomnia      ALLERGIES:  Allergies    No Known Allergies    Intolerances        VITAL SIGNS:  ICU Vital Signs Last 24 Hrs  T(C): 36.8 (17 Jun 2025 00:00), Max: 37 (16 Jun 2025 17:10)  T(F): 98.2 (17 Jun 2025 00:00), Max: 98.6 (16 Jun 2025 17:10)  HR: 112 (17 Jun 2025 04:00) (87 - 123)  BP: 102/77 (17 Jun 2025 04:00) (101/66 - 127/94)  BP(mean): 83 (17 Jun 2025 04:00) (74 - 92)  ABP: --  ABP(mean): --  RR: 21 (17 Jun 2025 04:00) (17 - 22)  SpO2: 97% (17 Jun 2025 04:00) (95% - 115%)    O2 Parameters below as of 17 Jun 2025 04:00  Patient On (Oxygen Delivery Method): room air            Plateau pressure:   P/F ratio:     06-16 @ 07:01  -  06-17 @ 07:00  --------------------------------------------------------  IN: 480 mL / OUT: 1470 mL / NET: -990 mL      CAPILLARY BLOOD GLUCOSE      POCT Blood Glucose.: 273 mg/dL (16 Jun 2025 13:53)    ECG:    PHYSICAL EXAM:  GENERAL: NAD, lying in bed comfortably  HEAD:  Atraumatic, normocephalic  EYES: EOMI, PERRLA, conjunctiva and sclera clear  ENT: Moist mucous membranes  NECK: Supple, no JVD  HEART: S1, S2, regular rate and rhythm, no murmurs, rubs, or gallops  LUNGS: Unlabored respirations.  Clear to auscultation bilaterally, no crackles, wheezing, or rhonchi  ABDOMEN: Soft, nontender, nondistended, +BS, bilateral PCN/PCNU in place, suprapubic tenderness  EXTREMITIES: 2+ peripheral pulses bilaterally. No clubbing, cyanosis, or edema  NERVOUS SYSTEM:  A&Ox3, no focal deficits   SKIN: No rashes or lesions  LINES:     LABS:                        8.7    10.85 )-----------( 289      ( 17 Jun 2025 02:53 )             25.7     06-17    137  |  108[H]  |  19  ----------------------------<  119[H]  4.1   |  19[L]  |  1.07    Ca    8.2[L]      17 Jun 2025 02:53  Phos  2.6     06-17  Mg     2.00     06-17    TPro  5.6[L]  /  Alb  3.1[L]  /  TBili  <0.2  /  DBili  x   /  AST  17  /  ALT  24  /  AlkPhos  67  06-17    PT/INR - ( 17 Jun 2025 02:53 )   PT: 13.5 sec;   INR: 1.17 ratio         PTT - ( 17 Jun 2025 02:53 )  PTT:26.3 sec  Urinalysis Basic - ( 17 Jun 2025 02:53 )    Color: x / Appearance: x / SG: x / pH: x  Gluc: 119 mg/dL / Ketone: x  / Bili: x / Urobili: x   Blood: x / Protein: x / Nitrite: x   Leuk Esterase: x / RBC: x / WBC x   Sq Epi: x / Non Sq Epi: x / Bacteria: x        RADIOLOGY & ADDITIONAL TESTS:

## 2025-06-17 NOTE — CHART NOTE - NSCHARTNOTEFT_GEN_A_CORE
MICU Downgrade Note  ---------------------------    Transfer from: MICU  Transfer to:  (  ) Medicine    (  ) Telemetry    (  ) RCU    (  ) Palliative    (  ) Stroke Unit    (  ) _______________  Accepting Physician:      MICU COURSE    Patient is a 59y old  Male who presents with a chief complaint of UTI w/ bilateral nephrostomy tubes (17 Jun 2025 14:58)      HPI:  59M w/ hx of prostate + anal cancer (s/p chemotherapy + radiation in 2020 c/b bilateral nephrostomy tubes and ostomy), HIV on Biktarvy, HTN, HLD, anxiety/depression presenting for bilateral flank pain and hematuria.  Patient reports that he had a abdominal hernia mesh last month in May around his ostomy site. For the last two weeks he has had pain that started at the stoma which then progressed to Right then left bilateral flank pain at his nephrostomy tube insertion sites. Patient came to the ED when he developed bloody output into both nephrostomy tubes (at baseline is normal yellow urine color) and pain was no longer managable with Tylenol. Patient denies any fevers chills, weight loss, SOB, URI symptoms, leg swelling.  Patient follows with urologist Dr. Mathew, oncologist Dr. Andres Funez, and a surgeon.   Of note, patient was admitted  one month prior for poor nephrostomy drainage and urosepsis. At baseline he has no urine output through the penis but he will urinate through the penis if one or more tubes become blocked.    ED Course:  Afebrile and vs unremarkable on RA. Seen by urology who felt flow was appropriate and recommended medical management. CBC showing hgb 12.5. CMP wnl. UA sent from both Left (first sample per ED noted) and Right nephrostomy tubes with high SG, moderate-Large blood, +leuk esterase, >50 WBC, and few bacteria. CT A/p showing bilateral nephroureteral tubes in place with decompressed bladder. Was given 1g ceftriaxone, Ofirmev, and morphine. (10 Al 2025 14:10)    In the MICUid not require pressors. Underwent cystoscopy, dilation, clot evacuation in the OR 6/17 with Urology. Plan for IR nephrostomy exchange on 6/19 with IR    [ ] Confirm with IR nephrostomy exchange on 6/19   [ ] Monitor Hgb BID  [ ] fu ID recs for UTI, on cefepime      MEDICATIONS:  acetaminophen     Tablet .. 975 milliGRAM(s) Oral every 8 hours  bictegravir 50 mG/emtricitabine 200 mG/tenofovir alafenamide 25 mG (BIKTARVY) 1 Tablet(s) Oral daily  bisacodyl 5 milliGRAM(s) Oral every 12 hours  buPROPion XL (24-Hour) . 150 milliGRAM(s) Oral daily  cefepime   IVPB      cefepime   IVPB 2000 milliGRAM(s) IV Intermittent every 12 hours  chlorhexidine 2% Cloths 1 Application(s) Topical daily  cholecalciferol 2000 Unit(s) Oral daily  clonazePAM  Tablet 1 milliGRAM(s) Oral two times a day PRN  doxycycline monohydrate Capsule 100 milliGRAM(s) Oral every 12 hours  ezetimibe 10 milliGRAM(s) Oral at bedtime  HYDROmorphone  Injectable 1 milliGRAM(s) IV Push every 4 hours PRN  metoprolol tartrate Injectable 2.5 milliGRAM(s) IV Push every 6 hours  multivitamin 1 Tablet(s) Oral daily  oxybutynin 5 milliGRAM(s) Oral every 8 hours  oxyCODONE    IR 5 milliGRAM(s) Oral every 4 hours PRN  polyethylene glycol 3350 17 Gram(s) Oral two times a day  traZODone 50 milliGRAM(s) Oral at bedtime  zolpidem 5 milliGRAM(s) Oral at bedtime PRN      T(C): 36.8 (06-17-25 @ 20:00), Max: 36.9 (06-17-25 @ 08:00)  HR: 113 (06-17-25 @ 20:00) (104 - 118)  BP: 135/89 (06-17-25 @ 20:00) (101/66 - 135/89)  RR: 20 (06-17-25 @ 20:00) (15 - 28)  SpO2: 98% (06-17-25 @ 20:00) (97% - 100%)  Wt(kg): --Vital Signs Last 24 Hrs  T(C): 36.8 (17 Jun 2025 20:00), Max: 36.9 (17 Jun 2025 08:00)  T(F): 98.2 (17 Jun 2025 20:00), Max: 98.5 (17 Jun 2025 08:00)  HR: 113 (17 Jun 2025 20:00) (104 - 118)  BP: 135/89 (17 Jun 2025 20:00) (101/66 - 135/89)  BP(mean): 98 (17 Jun 2025 20:00) (74 - 110)  RR: 20 (17 Jun 2025 20:00) (15 - 28)  SpO2: 98% (17 Jun 2025 20:00) (97% - 100%)    Parameters below as of 17 Jun 2025 20:00  Patient On (Oxygen Delivery Method): room air        PHYSICAL EXAM:  GENERAL: NAD, well-groomed, well-developed  HEAD:  Atraumatic, Normocephalic  EYES: EOMI, PERRLA, conjunctiva and sclera clear  ENMT:  Moist mucous membranes  NECK: Supple, No JVD,  CHEST/LUNG: Clear to auscultation bilaterally; No rales, rhonchi, wheezing, or rubs  HEART: Regular rate and rhythm; No murmurs, rubs, or gallops  ABDOMEN: Soft, Nontender, Nondistended; Bowel sounds present  NEURO: Alert & Oriented X3  EXTREMITIES: No LE edema, no calf tenderness  LYMPH: No lymphadenopathy noted  SKIN: No rashes or lesions    Consultant(s) Notes Reviewed:  [x ] YES  [ ] NO  Care Discussed with Consultants/Other Providers [ x] YES  [ ] NO    LABS:                        7.7    7.69  )-----------( 294      ( 17 Jun 2025 20:07 )             23.0     06-17    137  |  108[H]  |  19  ----------------------------<  119[H]  4.1   |  19[L]  |  1.07    Ca    8.2[L]      17 Jun 2025 02:53  Phos  2.6     06-17  Mg     2.00     06-17    TPro  5.6[L]  /  Alb  3.1[L]  /  TBili  <0.2  /  DBili  x   /  AST  17  /  ALT  24  /  AlkPhos  67  06-17    PT/INR - ( 17 Jun 2025 02:53 )   PT: 13.5 sec;   INR: 1.17 ratio         PTT - ( 17 Jun 2025 02:53 )  PTT:26.3 sec  Urinalysis Basic - ( 17 Jun 2025 02:53 )    Color: x / Appearance: x / SG: x / pH: x  Gluc: 119 mg/dL / Ketone: x  / Bili: x / Urobili: x   Blood: x / Protein: x / Nitrite: x   Leuk Esterase: x / RBC: x / WBC x   Sq Epi: x / Non Sq Epi: x / Bacteria: x      CAPILLARY BLOOD GLUCOSE          ABG - ( 16 Jun 2025 20:48 )  pH, Arterial: 7.41  pH, Blood: x     /  pCO2: 36    /  pO2: 100   / HCO3: 23    / Base Excess: -1.6  /  SaO2: 98.7              Urinalysis Basic - ( 17 Jun 2025 02:53 )    Color: x / Appearance: x / SG: x / pH: x  Gluc: 119 mg/dL / Ketone: x  / Bili: x / Urobili: x   Blood: x / Protein: x / Nitrite: x   Leuk Esterase: x / RBC: x / WBC x   Sq Epi: x / Non Sq Epi: x / Bacteria: x        RADIOLOGY & ADDITIONAL TESTS:    Imaging Personally Reviewed:  [x ] YES  [ ] NO    For Follow-Up:  [ ]   [ ] MICU Downgrade Note  ---------------------------    Transfer from: MICU  Transfer to:  (X) Medicine    (  ) Telemetry    (  ) RCU    (  ) Palliative    (  ) Stroke Unit    (  ) _______________  Accepting Physician:      MICU COURSE    Patient is a 59y old  Male who presents with a chief complaint of UTI w/ bilateral nephrostomy tubes (17 Jun 2025 14:58)      HPI:  59M w/ hx of prostate + anal cancer (s/p chemotherapy + radiation in 2020 c/b bilateral nephrostomy tubes and ostomy), HIV on Biktarvy, HTN, HLD, anxiety/depression presenting for bilateral flank pain and hematuria.  Patient reports that he had a abdominal hernia mesh last month in May around his ostomy site. For the last two weeks he has had pain that started at the stoma which then progressed to Right then left bilateral flank pain at his nephrostomy tube insertion sites. Patient came to the ED when he developed bloody output into both nephrostomy tubes (at baseline is normal yellow urine color) and pain was no longer managable with Tylenol. Patient denies any fevers chills, weight loss, SOB, URI symptoms, leg swelling.  Patient follows with urologist Dr. Mathew, oncologist Dr. Andres Funez, and a surgeon.   Of note, patient was admitted  one month prior for poor nephrostomy drainage and urosepsis. At baseline he has no urine output through the penis but he will urinate through the penis if one or more tubes become blocked.    ED Course:  Afebrile and vs unremarkable on RA. Seen by urology who felt flow was appropriate and recommended medical management. CBC showing hgb 12.5. CMP wnl. UA sent from both Left (first sample per ED noted) and Right nephrostomy tubes with high SG, moderate-Large blood, +leuk esterase, >50 WBC, and few bacteria. CT A/p showing bilateral nephroureteral tubes in place with decompressed bladder. Was given 1g ceftriaxone, Ofirmev, and morphine. (10 Al 2025 14:10)    In the MICUid not require pressors. Underwent cystoscopy, dilation, clot evacuation in the OR 6/17 with Urology. Plan for IR nephrostomy exchange on 6/19 with IR    [ ] Confirm with IR nephrostomy exchange on 6/19   [ ] Monitor Hgb BID  [ ] fu ID recs for UTI, on cefepime      MEDICATIONS:  acetaminophen     Tablet .. 975 milliGRAM(s) Oral every 8 hours  bictegravir 50 mG/emtricitabine 200 mG/tenofovir alafenamide 25 mG (BIKTARVY) 1 Tablet(s) Oral daily  bisacodyl 5 milliGRAM(s) Oral every 12 hours  buPROPion XL (24-Hour) . 150 milliGRAM(s) Oral daily  cefepime   IVPB      cefepime   IVPB 2000 milliGRAM(s) IV Intermittent every 12 hours  chlorhexidine 2% Cloths 1 Application(s) Topical daily  cholecalciferol 2000 Unit(s) Oral daily  clonazePAM  Tablet 1 milliGRAM(s) Oral two times a day PRN  doxycycline monohydrate Capsule 100 milliGRAM(s) Oral every 12 hours  ezetimibe 10 milliGRAM(s) Oral at bedtime  HYDROmorphone  Injectable 1 milliGRAM(s) IV Push every 4 hours PRN  metoprolol tartrate Injectable 2.5 milliGRAM(s) IV Push every 6 hours  multivitamin 1 Tablet(s) Oral daily  oxybutynin 5 milliGRAM(s) Oral every 8 hours  oxyCODONE    IR 5 milliGRAM(s) Oral every 4 hours PRN  polyethylene glycol 3350 17 Gram(s) Oral two times a day  traZODone 50 milliGRAM(s) Oral at bedtime  zolpidem 5 milliGRAM(s) Oral at bedtime PRN      T(C): 36.8 (06-17-25 @ 20:00), Max: 36.9 (06-17-25 @ 08:00)  HR: 113 (06-17-25 @ 20:00) (104 - 118)  BP: 135/89 (06-17-25 @ 20:00) (101/66 - 135/89)  RR: 20 (06-17-25 @ 20:00) (15 - 28)  SpO2: 98% (06-17-25 @ 20:00) (97% - 100%)  Wt(kg): --Vital Signs Last 24 Hrs  T(C): 36.8 (17 Jun 2025 20:00), Max: 36.9 (17 Jun 2025 08:00)  T(F): 98.2 (17 Jun 2025 20:00), Max: 98.5 (17 Jun 2025 08:00)  HR: 113 (17 Jun 2025 20:00) (104 - 118)  BP: 135/89 (17 Jun 2025 20:00) (101/66 - 135/89)  BP(mean): 98 (17 Jun 2025 20:00) (74 - 110)  RR: 20 (17 Jun 2025 20:00) (15 - 28)  SpO2: 98% (17 Jun 2025 20:00) (97% - 100%)    Parameters below as of 17 Jun 2025 20:00  Patient On (Oxygen Delivery Method): room air        PHYSICAL EXAM:  GENERAL: NAD, well-groomed, well-developed  HEAD:  Atraumatic, Normocephalic  EYES: EOMI, PERRLA, conjunctiva and sclera clear  ENMT:  Moist mucous membranes  NECK: Supple, No JVD,  CHEST/LUNG: Clear to auscultation bilaterally; No rales, rhonchi, wheezing, or rubs  HEART: Regular rate and rhythm; No murmurs, rubs, or gallops  ABDOMEN: Soft, Nontender, Nondistended; Bowel sounds present  NEURO: Alert & Oriented X3  EXTREMITIES: No LE edema, no calf tenderness  LYMPH: No lymphadenopathy noted  SKIN: No rashes or lesions    Consultant(s) Notes Reviewed:  [x ] YES  [ ] NO  Care Discussed with Consultants/Other Providers [ x] YES  [ ] NO    LABS:                        7.7    7.69  )-----------( 294      ( 17 Jun 2025 20:07 )             23.0     06-17    137  |  108[H]  |  19  ----------------------------<  119[H]  4.1   |  19[L]  |  1.07    Ca    8.2[L]      17 Jun 2025 02:53  Phos  2.6     06-17  Mg     2.00     06-17    TPro  5.6[L]  /  Alb  3.1[L]  /  TBili  <0.2  /  DBili  x   /  AST  17  /  ALT  24  /  AlkPhos  67  06-17    PT/INR - ( 17 Jun 2025 02:53 )   PT: 13.5 sec;   INR: 1.17 ratio         PTT - ( 17 Jun 2025 02:53 )  PTT:26.3 sec  Urinalysis Basic - ( 17 Jun 2025 02:53 )    Color: x / Appearance: x / SG: x / pH: x  Gluc: 119 mg/dL / Ketone: x  / Bili: x / Urobili: x   Blood: x / Protein: x / Nitrite: x   Leuk Esterase: x / RBC: x / WBC x   Sq Epi: x / Non Sq Epi: x / Bacteria: x      CAPILLARY BLOOD GLUCOSE          ABG - ( 16 Jun 2025 20:48 )  pH, Arterial: 7.41  pH, Blood: x     /  pCO2: 36    /  pO2: 100   / HCO3: 23    / Base Excess: -1.6  /  SaO2: 98.7              Urinalysis Basic - ( 17 Jun 2025 02:53 )    Color: x / Appearance: x / SG: x / pH: x  Gluc: 119 mg/dL / Ketone: x  / Bili: x / Urobili: x   Blood: x / Protein: x / Nitrite: x   Leuk Esterase: x / RBC: x / WBC x   Sq Epi: x / Non Sq Epi: x / Bacteria: x        RADIOLOGY & ADDITIONAL TESTS:    Imaging Personally Reviewed:  [x ] YES  [ ] NO

## 2025-06-17 NOTE — PROGRESS NOTE ADULT - ASSESSMENT
ASSESSMENT  JW ROWLEY is a 59y male with PMH prostate + anal cancer (s/p chemotherapy + radiation in 2020 c/b bilateral nephrostomy tubes and ostomy), HIV on Biktarvy, HTN, HLD, anxiety/depression in the hospital for 6d transferred to the MICU after IR exhange of nephroureterostomy tubes to nephrostomy tubes on 6/16 for monitoring.    PLAN  =====Neurologic=====  Patient is AOx4  Anxiety and depression.   - Continue home Buproprion  - Continue home PRN clonazepam  - Continue home Ambien as PRN.    =====Pulmonary=====  Patient breathing comfortably on room air    =====Cardiovascular=====  Patient does not currently require vasopressors and is normotensive    #HTN (hypertension).   - On diltiazem 240mg and metoprolol [id number] at home. Follows with cardiology but has no documented tachyarrhythmia history. Not septic during this admission.  - Continue home diltiazem 240 mg  - Continue home BB as Lopressor 50mg BID  - inpatient BP goals: SBP <140 (given hematuria).    #Sinus tachycardia.   - Sinus tach to 130s on 6/12 ekg w/ sinus tach. Likely iso pain vs. bleed vs. worsening sepsis +/- reflex from holding Metoprolol.   - Encourage home Klonopin use per patient usual schedule to avoid mild withdrawal symptoms  - Pain management and infection control   - plan per hematuria.   - Restarted BB as Lopressor 50mg BID  - Cont home diltiazem 240mg daily.    #HLD (hyperlipidemia).   - Continue home Ezetimibe  - Consider outpatient Lipid panel and Lp(a) assessment with cardiology.    =====GI=====  #Diet  - Full diet    =====Renal/=====  #Acute UTI.   - UA positive with bilateral nephrostomy site pain consistent with complex UTI without clinical or radiographic signs of pyelonephritis. Has a history of both sensitive and carbapenem-resistant pseudomonas. During hospital course, worsening tachycardia and pain with white count elevating.   - Zosyn (6/10PM -6/12), Cefepime 2g Q12 (6/12-  - UCx w/ >100k GNR; pending speciation, drawn from Nephrostomy tube  - ID following, appreciate recs  - standing tylenol for pain, oxycodone and IV dilaudid prn for moderate/severe pain.    #Hematuria.   - Dark colored in bilateral nephrostomy tubes. 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 Increased hematuria on 6/11 in R bag with clots. Per Urology nikia due to friable  tissue. Confirmed clot in bladder on 6/13 KUB US.  - IR converted nephroureterostomy tubes to nephrostomy tubes on 6/16 to reduce bladder irritation and allow bladder bleeding to tamponade off  - if H&H drops suddenly -> urgent CTA A&P arterial/venous phase eval for bleed and call IR   - Maintain active T&S  - transfuse PRN hgb <7 or for active bleeding  - BP regulation (SBP < 140).    #PCN Placement  - Monitor CBC q6h  - Transfuse PRN  - Patient complaining of bladder pain with passage of clots and PCNU on L side unable to be exchanged for PCN   - Urology plan for cysto, dilation, clot evacuation in the OR 6/17     #Electrolytes  - Maintain K>4, Phos>3, Mag>2, iCal>1    =====Endocrine=====  NA    =====Infectious Disease=====  #Syphilis, secondary.   - history of secondary syphillis which was dx ~5 years ago and treated, also treated ~1 year ago. Syphilis AB+ but confirmatory negative  - syphilis AB+ but negative confirmatory. Of note pt reports having skin biopsy several weeks ago with derm -> showed rare spirochete   - ID following appreciate recs.    #HIV on Biktarvy (CD4 624 in 12/2024 and VL UD 5/25)  - Start Doxycycline 100mg q 12, plan for 14 days (for outpatient finding of spirochetes on skin BX)  - C/w Biktarvy, HIV well controlled   - F/u UCx  - F/U BCXs      =====Heme/Onc=====  #History of Prostate and Anal cancer.   - S/p chemotherapy + radiation in 2020 c/b bilateral nephrostomy tubes and ostomy. In remission. Follows with Dr. Andres Funez (oncology)  - Follow up outpatient with oncology given no active concerns.    #DVT ppx  - SCD given hematuria    =====Ethics=====  FULL CODE.

## 2025-06-17 NOTE — CHART NOTE - NSCHARTNOTEFT_GEN_A_CORE
: Davion Hunter    INDICATION: anemia    PROCEDURE:  [x] LIMITED ECHO  [x] LIMITED CHEST  [ ] LIMITED RETROPERITONEAL  [ ] LIMITED ABDOMINAL  [ ] LIMITED DVT  [ ] NEEDLE GUIDANCE VASCULAR  [ ] NEEDLE GUIDANCE THORACENTESIS  [ ] NEEDLE GUIDANCE PARACENTESIS  [ ] NEEDLE GUIDANCE PERICARDIOCENTESIS  [ ] OTHER    FINDINGS:  Bilateral anterior a-line pattern. No pleural effusion. Bilateral posterior air bronchogram. Normal LVSF. LV>RV. Large right renal cyst and left renal cyst. Bilateral hydronephrosis.    INTERPRETATION:  Air bronchogram likely atelectasis. Bilateral hydronephrosis s/p nephrostomy tube.    Images uploaded on [employer] : Davion Hunter    INDICATION: anemia, hematuria    PROCEDURE:  [x] LIMITED ECHO  [x] LIMITED CHEST  [ ] LIMITED RETROPERITONEAL  [ ] LIMITED ABDOMINAL  [ ] LIMITED DVT  [ ] NEEDLE GUIDANCE VASCULAR  [ ] NEEDLE GUIDANCE THORACENTESIS  [ ] NEEDLE GUIDANCE PARACENTESIS  [ ] NEEDLE GUIDANCE PERICARDIOCENTESIS  [ ] OTHER    FINDINGS:  Bilateral anterior a-line pattern. No pleural effusion. Bilateral posterior air bronchogram. Normal LVSF. LV>RV. Large right renal cyst and left renal cyst. Bilateral hydronephrosis.    INTERPRETATION:  Air bronchogram likely atelectasis. Bilateral hydronephrosis s/p nephrostomy tube.    Images uploaded on [employer]    I have assisted and supervised entire procedure.      Jackson Wisdom MD

## 2025-06-17 NOTE — CHART NOTE - NSCHARTNOTEFT_GEN_A_CORE
PRE-INTERVENTIONAL RADIOLOGY PROCEDURE NOTE      Patient Age: 59y    Patient Gender: Male    Procedure: PCNU to PCN exchange    Diagnosis/Indication:     Interventional Radiology Attending Physician:     Ordering Attending Physician: Jackson Wisdom    Pertinent Medical History:   HTN (hypertension)      HIV infection      Anxiety      History of anal cancer      History of prostate cancer      HLD (hyperlipidemia)      Parastomal hernia with obstruction and without gangrene      S/P colostomy      Nephrostomy present    Pertinent labs:                      8.7    10.85 )-----------( 289      ( 17 Jun 2025 02:53 )             25.7       06-17    137  |  108[H]  |  19  ----------------------------<  119[H]  4.1   |  19[L]  |  1.07    Ca    8.2[L]      17 Jun 2025 02:53  Phos  2.6     06-17  Mg     2.00     06-17    TPro  5.6[L]  /  Alb  3.1[L]  /  TBili  <0.2  /  DBili  x   /  AST  17  /  ALT  24  /  AlkPhos  67  06-17      PT/INR - ( 17 Jun 2025 02:53 )   PT: 13.5 sec;   INR: 1.17 ratio         PTT - ( 17 Jun 2025 02:53 )  PTT:26.3 sec        Patient and Family Aware ? Yes

## 2025-06-17 NOTE — PROGRESS NOTE ADULT - SUBJECTIVE AND OBJECTIVE BOX
CC: F/U for UTI    Saw/spoke to patient. No fevers, no chills. No new complaints.    Allergies  No Known Allergies    ANTIMICROBIALS:  bictegravir 50 mG/emtricitabine 200 mG/tenofovir alafenamide 25 mG (BIKTARVY) 1 daily  cefepime   IVPB    cefepime   IVPB 2000 every 12 hours  doxycycline monohydrate Capsule 100 every 12 hours    PE:    Vital Signs Last 24 Hrs  T(C): 36.9 (2025 08:00), Max: 37 (2025 17:10)  T(F): 98.5 (2025 08:00), Max: 98.6 (2025 17:10)  HR: 109 (2025 12:00) (95 - 123)  BP: 114/75 (2025 12:00) (101/66 - 127/94)  BP(mean): 84 (2025 12:00) (74 - 102)  RR: 17 (2025 12:00) (17 - 28)  SpO2: 99% (2025 12:00) (95% - 115%)    Gen: AOx3, NAD, non-toxic  CV: Nontachycardic  Resp: Breathing comfortably, RA  Abd: Soft, nontender  IV/Skin: No thrombophlebitis    LABS:                        8.7    10.85 )-----------( 289      ( 2025 02:53 )             25.7     06-17    137  |  108[H]  |  19  ----------------------------<  119[H]  4.1   |  19[L]  |  1.07    Ca    8.2[L]      2025 02:53  Phos  2.6     06-17  Mg     2.00     06-17    TPro  5.6[L]  /  Alb  3.1[L]  /  TBili  <0.2  /  DBili  x   /  AST  17  /  ALT  24  /  AlkPhos  67  06-17    Urinalysis Basic - ( 2025 02:53 )    Color: x / Appearance: x / SG: x / pH: x  Gluc: 119 mg/dL / Ketone: x  / Bili: x / Urobili: x   Blood: x / Protein: x / Nitrite: x   Leuk Esterase: x / RBC: x / WBC x   Sq Epi: x / Non Sq Epi: x / Bacteria: x    MICROBIOLOGY:    Blood Blood  25   No growth at 4 days  --  --    Blood Blood  25   No growth at 4 days  --  --    Urine  06-10-25   >100,000 CFU/ml Enterobacter cloacae complex  50,000 - 99,000 CFU/mL Pseudomonas aeruginosa  --  Enterobacter cloacae complex  Pseudomonas aeruginosa    HIV-1 RNA Quantitative, Viral Load Log: NOT DET. lg /mL (05-10-25 @ 02:15)  HIV-1 RNA Quantitative, Viral Load Log: NOT DET. lg /mL (24 @ 19:52)  HIV-1 RNA Quantitative, Viral Load Lo.5 (24 @ 11:25)    RADIOLOGY:    CT    IMPRESSION:  1.  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  2.  Right distal ureter appears to be obstructed with blood clot.   Additional hematoma in the right proximal urinary collecting system and   urinary bladder. No evidence of active extravasation.

## 2025-06-17 NOTE — CHART NOTE - NSCHARTNOTEFT_GEN_A_CORE
Urology performed a cystoscopy, clot evacuation, fulguration of bladder trigone and prostatic urethra.  ~100cc soft gelatinous clot was evacuated from patient's bladder, right ureteral orifice in orthotopic position and patent, left ureteral orifice observed with L NU distal curl eminating from it.  Bladder mucosa was inspected and did not show any active bleeding, radiation cystitis changes, or bladder tumor that would be responsible for this quantity of clot. so we suspect source of clot is from right collecting system.   [id number] catheter was placed prophylactically in case patient needs to be irrigated or started on CBI (eg: prophylactically prior to L NU to NT exchange). It is draining to gravity clear straw colored urine.    Recommendations:  - Continue to monitor urine color, trend H/H  - Irrigate catheter PRN if old clot from R collecting system passes down into the bladder  - Continue antibiotics  - Continue R NT and L NU to drainage  - Will discuss exchange of L NU to L NT with IR    Urology  [id number]

## 2025-06-17 NOTE — PROGRESS NOTE ADULT - SUBJECTIVE AND OBJECTIVE BOX
Subjective  Denies fevers, chills, nausea, vomiting, SOB, CP. Patient denies any flank pain. Continues to endorse bladder spasms, bladder pain and passage of clots from below.     Objective    Vital signs  T(F): , Max: 98.6 (06-16-25 @ 17:10)  HR: 112 (06-17-25 @ 04:00)  BP: 102/77 (06-17-25 @ 04:00)  SpO2: 97% (06-17-25 @ 04:00)  Wt(kg): --    Output     OUT:    Nephrostomy Tube (mL): 720 mL    Nephrostomy Tube (mL): 570 mL  Total OUT: 1290 mL    Total NET: -1290 mL      OUT:    Nephrostomy Tube (mL): 920 mL    Nephrostomy Tube (mL): 350 mL  Total OUT: 1270 mL    Total NET: -1270 mL          Gen: NAD  Abd: soft, nontender, nondistended  : R PCN site secured in place, no R flank hematoma, R PCN with light pink output. L PCNU with maroon output. Doyle below covered with maroon output.     Labs      06-17 @ 02:53    WBC 10.85 / Hct 25.7  / SCr 1.07     06-16 @ 20:48    WBC 13.38 / Hct 24.5  / SCr --           Culture - Blood (collected 06-12-25 @ 18:07)  Source: Blood Blood  Preliminary Report (06-16-25 @ 22:01):    No growth at 4 days    Culture - Blood (collected 06-12-25 @ 17:52)  Source: Blood Blood  Preliminary Report (06-16-25 @ 22:01):    No growth at 4 days    Urinalysis with Rflx Culture (collected 06-10-25 @ 09:50)    Urinalysis with Rflx Culture (collected 06-10-25 @ 08:00)    Culture - Urine (collected 06-10-25 @ 08:00)  Source: Urine  Final Report (06-16-25 @ 21:02):    >100,000 CFU/ml Enterobacter cloacae complex    50,000 - 99,000 CFU/mL Pseudomonas aeruginosa  Organism: Enterobacter cloacae complex  Pseudomonas aeruginosa (06-16-25 @ 21:02)  Organism: Pseudomonas aeruginosa (06-16-25 @ 21:02)      -  Levofloxacin: I 2      -  Aztreonam: I 16      -  Cefepime: S <=2      -  Piperacillin/Tazobactam: S 16      -  Ciprofloxacin: S 0.5      -  Imipenem: S <=1      Method Type: SADE      -  Meropenem: S <=1      -  Ceftazidime: R >16      -  Amikacin: S <=16  Organism: Enterobacter cloacae complex (06-16-25 @ 21:02)      -  Levofloxacin: S <=0.5      -  Tobramycin: S <=2      -  Nitrofurantoin: R >64 Should not be used to treat pyelonephritis      -  Aztreonam: I 16      -  Gentamicin: S <=2      -  Cefazolin: R >16      -  Cefepime: S <=2      -  Piperacillin/Tazobactam: S <=8 Treatment with Pipercillin/Tazobactam is not recommended in severe infections casued by Klebsiella aerogenes, Enterobacter cloacae complex, and Citrobacter freundii complex.      -  Ciprofloxacin: S <=0.25      -  Imipenem: S <=1      -  Ceftriaxone: S <=1 Enterobacter cloacae, Klebsiella aerogenes, and Citrobacter freundii may develop resistance during prolonged therapy.      -  Ampicillin: R >16 These ampicillin results predict results for amoxicillin      Method Type: SADE      -  Meropenem: S <=1      -  Ampicillin/Sulbactam: R >16/8      -  Cefoxitin: R >16      -  Amoxicillin/Clavulanic Acid: R >16/8      -  Trimethoprim/Sulfamethoxazole: S <=0.5/9.5      -  Tigecycline: S <=2 Interpretations based on FDA breakpoints      -  Ertapenem: S <=0.5        Urine Cx: ?  Blood Cx: ?    Imaging

## 2025-06-17 NOTE — PROGRESS NOTE ADULT - ATTENDING COMMENTS
Agree with above  Persistent hematuria into nephrostomies  Suprapubic discomfort  Hgb stable  To OR for cysto clot evac fulguration

## 2025-06-18 ENCOUNTER — APPOINTMENT (OUTPATIENT)
Dept: DERMATOLOGY | Facility: CLINIC | Age: 60
End: 2025-06-18

## 2025-06-18 LAB
ALBUMIN SERPL ELPH-MCNC: 3.1 G/DL — LOW (ref 3.3–5)
ALP SERPL-CCNC: 63 U/L — SIGNIFICANT CHANGE UP (ref 40–120)
ALT FLD-CCNC: 16 U/L — SIGNIFICANT CHANGE UP (ref 4–41)
ANION GAP SERPL CALC-SCNC: 9 MMOL/L — SIGNIFICANT CHANGE UP (ref 7–14)
APTT BLD: 26 SEC — LOW (ref 26.1–36.8)
AST SERPL-CCNC: 13 U/L — SIGNIFICANT CHANGE UP (ref 4–40)
BASOPHILS # BLD AUTO: 0.05 K/UL — SIGNIFICANT CHANGE UP (ref 0–0.2)
BASOPHILS NFR BLD AUTO: 0.6 % — SIGNIFICANT CHANGE UP (ref 0–2)
BILIRUB SERPL-MCNC: <0.2 MG/DL — SIGNIFICANT CHANGE UP (ref 0.2–1.2)
BLD GP AB SCN SERPL QL: NEGATIVE — SIGNIFICANT CHANGE UP
BUN SERPL-MCNC: 17 MG/DL — SIGNIFICANT CHANGE UP (ref 7–23)
CALCIUM SERPL-MCNC: 8.2 MG/DL — LOW (ref 8.4–10.5)
CHLORIDE SERPL-SCNC: 107 MMOL/L — SIGNIFICANT CHANGE UP (ref 98–107)
CO2 SERPL-SCNC: 23 MMOL/L — SIGNIFICANT CHANGE UP (ref 22–31)
CREAT SERPL-MCNC: 0.94 MG/DL — SIGNIFICANT CHANGE UP (ref 0.5–1.3)
EGFR: 94 ML/MIN/1.73M2 — SIGNIFICANT CHANGE UP
EGFR: 94 ML/MIN/1.73M2 — SIGNIFICANT CHANGE UP
EOSINOPHIL # BLD AUTO: 0.12 K/UL — SIGNIFICANT CHANGE UP (ref 0–0.5)
EOSINOPHIL NFR BLD AUTO: 1.4 % — SIGNIFICANT CHANGE UP (ref 0–6)
GLUCOSE SERPL-MCNC: 103 MG/DL — HIGH (ref 70–99)
HCT VFR BLD CALC: 20.6 % — CRITICAL LOW (ref 39–50)
HCT VFR BLD CALC: 23 % — LOW (ref 39–50)
HGB BLD-MCNC: 7 G/DL — CRITICAL LOW (ref 13–17)
HGB BLD-MCNC: 7.8 G/DL — LOW (ref 13–17)
IMM GRANULOCYTES # BLD AUTO: 0.14 K/UL — HIGH (ref 0–0.07)
IMM GRANULOCYTES NFR BLD AUTO: 1.6 % — HIGH (ref 0–0.9)
INR BLD: 1.13 RATIO — SIGNIFICANT CHANGE UP (ref 0.85–1.16)
LYMPHOCYTES # BLD AUTO: 1.59 K/UL — SIGNIFICANT CHANGE UP (ref 1–3.3)
LYMPHOCYTES NFR BLD AUTO: 18.7 % — SIGNIFICANT CHANGE UP (ref 13–44)
MAGNESIUM SERPL-MCNC: 2 MG/DL — SIGNIFICANT CHANGE UP (ref 1.6–2.6)
MCHC RBC-ENTMCNC: 29.7 PG — SIGNIFICANT CHANGE UP (ref 27–34)
MCHC RBC-ENTMCNC: 30.6 PG — SIGNIFICANT CHANGE UP (ref 27–34)
MCHC RBC-ENTMCNC: 33.9 G/DL — SIGNIFICANT CHANGE UP (ref 32–36)
MCHC RBC-ENTMCNC: 34 G/DL — SIGNIFICANT CHANGE UP (ref 32–36)
MCV RBC AUTO: 87.5 FL — SIGNIFICANT CHANGE UP (ref 80–100)
MCV RBC AUTO: 90 FL — SIGNIFICANT CHANGE UP (ref 80–100)
MONOCYTES # BLD AUTO: 0.95 K/UL — HIGH (ref 0–0.9)
MONOCYTES NFR BLD AUTO: 11.2 % — SIGNIFICANT CHANGE UP (ref 2–14)
NEUTROPHILS # BLD AUTO: 5.64 K/UL — SIGNIFICANT CHANGE UP (ref 1.8–7.4)
NEUTROPHILS NFR BLD AUTO: 66.5 % — SIGNIFICANT CHANGE UP (ref 43–77)
NRBC # BLD AUTO: 0.03 K/UL — HIGH (ref 0–0)
NRBC # BLD AUTO: 0.03 K/UL — HIGH (ref 0–0)
NRBC # FLD: 0.03 K/UL — HIGH (ref 0–0)
NRBC # FLD: 0.03 K/UL — HIGH (ref 0–0)
NRBC BLD AUTO-RTO: 0 /100 WBCS — SIGNIFICANT CHANGE UP (ref 0–0)
NRBC BLD AUTO-RTO: 0 /100 WBCS — SIGNIFICANT CHANGE UP (ref 0–0)
PHOSPHATE SERPL-MCNC: 2.4 MG/DL — LOW (ref 2.5–4.5)
PLATELET # BLD AUTO: 287 K/UL — SIGNIFICANT CHANGE UP (ref 150–400)
PLATELET # BLD AUTO: 297 K/UL — SIGNIFICANT CHANGE UP (ref 150–400)
PMV BLD: 8.9 FL — SIGNIFICANT CHANGE UP (ref 7–13)
PMV BLD: 9.2 FL — SIGNIFICANT CHANGE UP (ref 7–13)
POTASSIUM SERPL-MCNC: 3.9 MMOL/L — SIGNIFICANT CHANGE UP (ref 3.5–5.3)
POTASSIUM SERPL-SCNC: 3.9 MMOL/L — SIGNIFICANT CHANGE UP (ref 3.5–5.3)
PROT SERPL-MCNC: 5.4 G/DL — LOW (ref 6–8.3)
PROTHROM AB SERPL-ACNC: 13.4 SEC — SIGNIFICANT CHANGE UP (ref 9.9–13.4)
RBC # BLD: 2.29 M/UL — LOW (ref 4.2–5.8)
RBC # BLD: 2.63 M/UL — LOW (ref 4.2–5.8)
RBC # FLD: 15.8 % — HIGH (ref 10.3–14.5)
RBC # FLD: 16.4 % — HIGH (ref 10.3–14.5)
RH IG SCN BLD-IMP: NEGATIVE — SIGNIFICANT CHANGE UP
SODIUM SERPL-SCNC: 139 MMOL/L — SIGNIFICANT CHANGE UP (ref 135–145)
WBC # BLD: 10.06 K/UL — SIGNIFICANT CHANGE UP (ref 3.8–10.5)
WBC # BLD: 8.49 K/UL — SIGNIFICANT CHANGE UP (ref 3.8–10.5)
WBC # FLD AUTO: 10.06 K/UL — SIGNIFICANT CHANGE UP (ref 3.8–10.5)
WBC # FLD AUTO: 8.49 K/UL — SIGNIFICANT CHANGE UP (ref 3.8–10.5)

## 2025-06-18 PROCEDURE — 99232 SBSQ HOSP IP/OBS MODERATE 35: CPT

## 2025-06-18 PROCEDURE — 99233 SBSQ HOSP IP/OBS HIGH 50: CPT | Mod: GC

## 2025-06-18 PROCEDURE — G0545: CPT

## 2025-06-18 RX ORDER — POTASSIUM PHOSPHATE, MONOBASIC POTASSIUM PHOSPHATE, DIBASIC INJECTION, 236; 224 MG/ML; MG/ML
15 SOLUTION, CONCENTRATE INTRAVENOUS ONCE
Refills: 0 | Status: COMPLETED | OUTPATIENT
Start: 2025-06-18 | End: 2025-06-18

## 2025-06-18 RX ORDER — HYDROMORPHONE/SOD CHLOR,ISO/PF 2 MG/10 ML
1.25 SYRINGE (ML) INJECTION EVERY 4 HOURS
Refills: 0 | Status: DISCONTINUED | OUTPATIENT
Start: 2025-06-18 | End: 2025-06-20

## 2025-06-18 RX ADMIN — OXYBUTYNIN CHLORIDE 5 MILLIGRAM(S): 5 TABLET, FILM COATED, EXTENDED RELEASE ORAL at 05:24

## 2025-06-18 RX ADMIN — Medication 1.25 MILLIGRAM(S): at 09:54

## 2025-06-18 RX ADMIN — BUPROPION HYDROBROMIDE 150 MILLIGRAM(S): 522 TABLET, EXTENDED RELEASE ORAL at 13:39

## 2025-06-18 RX ADMIN — Medication 1.25 MILLIGRAM(S): at 22:20

## 2025-06-18 RX ADMIN — Medication 1.25 MILLIGRAM(S): at 18:09

## 2025-06-18 RX ADMIN — Medication 1.25 MILLIGRAM(S): at 13:54

## 2025-06-18 RX ADMIN — Medication 975 MILLIGRAM(S): at 21:35

## 2025-06-18 RX ADMIN — OXYBUTYNIN CHLORIDE 5 MILLIGRAM(S): 5 TABLET, FILM COATED, EXTENDED RELEASE ORAL at 13:41

## 2025-06-18 RX ADMIN — Medication 1.25 MILLIGRAM(S): at 22:05

## 2025-06-18 RX ADMIN — Medication 1.25 MILLIGRAM(S): at 17:54

## 2025-06-18 RX ADMIN — EZETIMIBE 10 MILLIGRAM(S): 10 TABLET ORAL at 21:52

## 2025-06-18 RX ADMIN — POTASSIUM PHOSPHATE, MONOBASIC POTASSIUM PHOSPHATE, DIBASIC INJECTION, 62.5 MILLIMOLE(S): 236; 224 SOLUTION, CONCENTRATE INTRAVENOUS at 18:38

## 2025-06-18 RX ADMIN — Medication 1.25 MILLIGRAM(S): at 13:39

## 2025-06-18 RX ADMIN — Medication 1.25 MILLIGRAM(S): at 09:39

## 2025-06-18 RX ADMIN — Medication 975 MILLIGRAM(S): at 07:30

## 2025-06-18 RX ADMIN — Medication 1 MILLIGRAM(S): at 05:24

## 2025-06-18 RX ADMIN — Medication 1 TABLET(S): at 13:40

## 2025-06-18 RX ADMIN — OXYBUTYNIN CHLORIDE 5 MILLIGRAM(S): 5 TABLET, FILM COATED, EXTENDED RELEASE ORAL at 21:53

## 2025-06-18 RX ADMIN — Medication 1 MILLIGRAM(S): at 05:39

## 2025-06-18 RX ADMIN — CEFEPIME 100 MILLIGRAM(S): 2 INJECTION, POWDER, FOR SOLUTION INTRAVENOUS at 17:54

## 2025-06-18 RX ADMIN — METOPROLOL SUCCINATE 2.5 MILLIGRAM(S): 50 TABLET, EXTENDED RELEASE ORAL at 05:23

## 2025-06-18 RX ADMIN — Medication 2000 UNIT(S): at 13:40

## 2025-06-18 RX ADMIN — Medication 975 MILLIGRAM(S): at 06:48

## 2025-06-18 RX ADMIN — Medication 975 MILLIGRAM(S): at 13:40

## 2025-06-18 RX ADMIN — Medication 100 MILLIGRAM(S): at 05:19

## 2025-06-18 RX ADMIN — Medication 50 MILLIGRAM(S): at 21:53

## 2025-06-18 RX ADMIN — CEFEPIME 100 MILLIGRAM(S): 2 INJECTION, POWDER, FOR SOLUTION INTRAVENOUS at 05:19

## 2025-06-18 RX ADMIN — BICTEGRAVIR SODIUM, EMTRICITABINE, AND TENOFOVIR ALAFENAMIDE FUMARATE 1 TABLET(S): 50; 200; 25 TABLET ORAL at 13:40

## 2025-06-18 RX ADMIN — Medication 1 APPLICATION(S): at 17:59

## 2025-06-18 NOTE — DIETITIAN INITIAL EVALUATION ADULT - PERTINENT MEDS FT
MEDICATIONS  (STANDING):  acetaminophen     Tablet .. 975 milliGRAM(s) Oral every 8 hours  bictegravir 50 mG/emtricitabine 200 mG/tenofovir alafenamide 25 mG (BIKTARVY) 1 Tablet(s) Oral daily  bisacodyl 5 milliGRAM(s) Oral every 12 hours  buPROPion XL (24-Hour) . 150 milliGRAM(s) Oral daily  cefepime   IVPB 2000 milliGRAM(s) IV Intermittent every 12 hours  cefepime   IVPB      chlorhexidine 2% Cloths 1 Application(s) Topical daily  cholecalciferol 2000 Unit(s) Oral daily  ezetimibe 10 milliGRAM(s) Oral at bedtime  metoprolol tartrate Injectable 2.5 milliGRAM(s) IV Push every 6 hours  multivitamin 1 Tablet(s) Oral daily  oxybutynin 5 milliGRAM(s) Oral every 8 hours  polyethylene glycol 3350 17 Gram(s) Oral two times a day  traZODone 50 milliGRAM(s) Oral at bedtime    MEDICATIONS  (PRN):  clonazePAM  Tablet 1 milliGRAM(s) Oral two times a day PRN for anxiety  HYDROmorphone  Injectable 1.25 milliGRAM(s) IV Push every 4 hours PRN Severe Pain (7 - 10)  oxyCODONE    IR 5 milliGRAM(s) Oral every 4 hours PRN Moderate Pain (4 - 6)  zolpidem 5 milliGRAM(s) Oral at bedtime PRN Insomnia

## 2025-06-18 NOTE — DIETITIAN INITIAL EVALUATION ADULT - OTHER INFO
59 year old male w/ a PMH of prostate + anal cancer, HIV, HTN, HLD, anxiety/depression presenting for bilateral flank pain and hematuria found to have positive UA and normal functioning nephrostomy tubes per urology suggestive of UTI. Transferred to MICU, underwent cystoscopy, dilation, clot evacuation in the OR 6/17 with urology, not requiring pressors now transferred to floors for plan for IR nephrostomy exchange on 6/19 per chart.    Patient reporting fair appetite due to pain, consumed all of breakfast this morning. Noted w/ some intakes 0-100% per RN flow sheet. Food preferences reviewed. Reports diarrhea, likely from antibiotics and also on Biktarvy. Has no food allergies. Reports losing weight. Per HIE, noted w/ weights 83.5 kg (5/9), 90.7 kg (3/11), 93 kg (12/30/24) and 94.8 kg (6/28/24). ABW is 86.2 kg or 88.3 kg (6/17) and 86.2 kg (6/10)-*stated per chart, weight accuracy questionable at this time. No edema or pressure injuries noted per RN flow sheet.    Patient w/ no questions regarding nutrition at this time.

## 2025-06-18 NOTE — DIETITIAN INITIAL EVALUATION ADULT - PROBLEM SELECTOR PLAN 3
On diltiazem 240mg and metoprolol [id number] at home. Follows with cardiology but has no documented tachyarrhythmia history. Not septic during this admission.    - Continue home diltiazem 240 mg  - Hold home metoprolol succinate for now ISO infection  - inpatient BP goals:  - 160 (given hematuria)

## 2025-06-18 NOTE — PROGRESS NOTE ADULT - SUBJECTIVE AND OBJECTIVE BOX
CC: F/U for UTI    Saw/spoke to patient. No fevers, no chills. No new complaints.    Allergies  No Known Allergies    ANTIMICROBIALS:  bictegravir 50 mG/emtricitabine 200 mG/tenofovir alafenamide 25 mG (BIKTARVY) 1 daily  cefepime   IVPB    cefepime   IVPB 2000 every 12 hours    PE:    Vital Signs Last 24 Hrs  T(C): 36.6 (2025 16:20), Max: 36.8 (2025 20:00)  T(F): 97.9 (2025 16:20), Max: 98.2 (2025 20:00)  HR: 111 (2025 16:20) (104 - 116)  BP: 109/80 (2025 16:20) (101/60 - 135/89)  BP(mean): 98 (2025 20:00) (92 - 99)  RR: 17 (2025 16:20) (17 - 27)  SpO2: 99% (2025 16:20) (95% - 100%)    Gen: AOx3, NAD, non-toxic  CV: Nontachycardic  Resp: Breathing comfortably, RA  Abd: Soft, nontender  IV/Skin: No thrombophlebitis    LABS:                        7.0    8.49  )-----------( 297      ( 2025 06:50 )             20.6     06-18    139  |  107  |  17  ----------------------------<  103[H]  3.9   |  23  |  0.94    Ca    8.2[L]      2025 06:50  Phos  2.4     06-18  Mg     2.00     06-18    TPro  5.4[L]  /  Alb  3.1[L]  /  TBili  <0.2  /  DBili  x   /  AST  13  /  ALT  16  /  AlkPhos  63  06-18    Urinalysis Basic - ( 2025 06:50 )    Color: x / Appearance: x / SG: x / pH: x  Gluc: 103 mg/dL / Ketone: x  / Bili: x / Urobili: x   Blood: x / Protein: x / Nitrite: x   Leuk Esterase: x / RBC: x / WBC x   Sq Epi: x / Non Sq Epi: x / Bacteria: x    MICROBIOLOGY:    Blood Blood  25   No growth at 5 days  --  --    Blood Blood  25   No growth at 5 days  --  --    Urine  06-10-25   >100,000 CFU/ml Enterobacter cloacae complex  50,000 - 99,000 CFU/mL Pseudomonas aeruginosa  --  Enterobacter cloacae complex  Pseudomonas aeruginosa    HIV-1 RNA Quantitative, Viral Load Log: NOT DET. lg /mL (05-10-25 @ 02:15)  HIV-1 RNA Quantitative, Viral Load Log: NOT DET. lg /mL (24 @ 19:52)  HIV-1 RNA Quantitative, Viral Load Lo.5 (24 @ 11:25)    RADIOLOGY:         IMPRESSION:  1.  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  2.  Right distal ureter appears to be obstructed with blood clot.   Additional hematoma in the right proximal urinary collecting system and   urinary bladder. No evidence of active extravasation.

## 2025-06-18 NOTE — DIETITIAN NUTRITION RISK NOTIFICATION - TREATMENT: THE FOLLOWING DIET HAS BEEN RECOMMENDED
Diet, NPO after Midnight:      NPO Start Date: 18-Jun-2025,   NPO Start Time: 23:59 (06-18-25 @ 07:31) [Active]  Diet, Regular (06-18-25 @ 07:30) [Active]

## 2025-06-18 NOTE — PROGRESS NOTE ADULT - SUBJECTIVE AND OBJECTIVE BOX
Amos Pollock MD  PGY 2 Department of Internal Medicine        Patient is a 59y old  Male who presents with a chief complaint of UTI w/ bilateral nephrostomy tubes (18 Jun 2025 06:59)      SUBJECTIVE / OVERNIGHT EVENTS: Pt seen and examined. No acute overnight events. Denies fevers, chills, CP, SOB, Abdominal pain, N/V, Constipation, Diarrhea        MEDICATIONS  (STANDING):  acetaminophen     Tablet .. 975 milliGRAM(s) Oral every 8 hours  bictegravir 50 mG/emtricitabine 200 mG/tenofovir alafenamide 25 mG (BIKTARVY) 1 Tablet(s) Oral daily  bisacodyl 5 milliGRAM(s) Oral every 12 hours  buPROPion XL (24-Hour) . 150 milliGRAM(s) Oral daily  cefepime   IVPB      cefepime   IVPB 2000 milliGRAM(s) IV Intermittent every 12 hours  chlorhexidine 2% Cloths 1 Application(s) Topical daily  cholecalciferol 2000 Unit(s) Oral daily  doxycycline monohydrate Capsule 100 milliGRAM(s) Oral every 12 hours  ezetimibe 10 milliGRAM(s) Oral at bedtime  metoprolol tartrate Injectable 2.5 milliGRAM(s) IV Push every 6 hours  multivitamin 1 Tablet(s) Oral daily  oxybutynin 5 milliGRAM(s) Oral every 8 hours  polyethylene glycol 3350 17 Gram(s) Oral two times a day  traZODone 50 milliGRAM(s) Oral at bedtime    MEDICATIONS  (PRN):  clonazePAM  Tablet 1 milliGRAM(s) Oral two times a day PRN for anxiety  HYDROmorphone  Injectable 1 milliGRAM(s) IV Push every 4 hours PRN Severe Pain (7 - 10)  oxyCODONE    IR 5 milliGRAM(s) Oral every 4 hours PRN Moderate Pain (4 - 6)  zolpidem 5 milliGRAM(s) Oral at bedtime PRN Insomnia      I&O's Summary    17 Jun 2025 07:01  -  18 Jun 2025 07:00  --------------------------------------------------------  IN: 0 mL / OUT: 580 mL / NET: -580 mL        Vital Signs Last 24 Hrs  T(C): 36.6 (18 Jun 2025 05:19), Max: 36.9 (17 Jun 2025 08:00)  T(F): 97.8 (18 Jun 2025 05:19), Max: 98.5 (17 Jun 2025 08:00)  HR: 113 (18 Jun 2025 05:19) (104 - 118)  BP: 123/82 (18 Jun 2025 05:19) (108/89 - 135/89)  BP(mean): 98 (17 Jun 2025 20:00) (84 - 110)  RR: 20 (18 Jun 2025 05:19) (15 - 28)  SpO2: 98% (18 Jun 2025 05:19) (95% - 100%)    Parameters below as of 18 Jun 2025 05:19  Patient On (Oxygen Delivery Method): room air        CAPILLARY BLOOD GLUCOSE          PHYSICAL EXAM:  GENERAL: NAD,   HEAD:  Atraumatic, Normocephalic  EYES: EOMI, PERRL, conjunctiva and sclera clear  NECK: No JVD  CHEST/LUNG: Clear to auscultation bilaterally; No wheeze  HEART: Regular rate and rhythm; No murmurs, rubs, or gallops  ABDOMEN: Soft, Nontender, Nondistended; Bowel sounds present  EXTREMITIES:  2+ Peripheral Pulses, No clubbing, cyanosis, or edema  PSYCH: AAOx3  NEUROLOGY: non-focal  SKIN: No rashes or lesions       LABS:                        7.7    7.69  )-----------( 294      ( 17 Jun 2025 20:07 )             23.0     Auto Eosinophil # 0.08  / Auto Eosinophil % 1.0   / Auto Neutrophil # 3.97  / Auto Neutrophil % 51.7  / BANDS % x                            7.5    7.67  )-----------( 285      ( 17 Jun 2025 17:00 )             22.6     Auto Eosinophil # 0.04  / Auto Eosinophil % 0.5   / Auto Neutrophil # 4.18  / Auto Neutrophil % 54.6  / BANDS % x                            8.7    10.85 )-----------( 289      ( 17 Jun 2025 02:53 )             25.7     Auto Eosinophil # x     / Auto Eosinophil % x     / Auto Neutrophil # x     / Auto Neutrophil % x     / BANDS % x        06-17    137  |  108[H]  |  19  ----------------------------<  119[H]  4.1   |  19[L]  |  1.07  06-16    138  |  106  |  19  ----------------------------<  109[H]  4.2   |  23  |  1.18  06-16    146[H]  |  128[H]  |  8   ----------------------------<  99  1.7[LL]   |  8[LL]  |  0.44[L]    Ca    8.2[L]      17 Jun 2025 02:53  Mg     2.00     06-17  Phos  2.6     06-17  TPro  5.6[L]  /  Alb  3.1[L]  /  TBili  <0.2  /  DBili  x   /  AST  17  /  ALT  24  /  AlkPhos  67  06-17  TPro  5.7[L]  /  Alb  3.1[L]  /  TBili  <0.2  /  DBili  x   /  AST  19  /  ALT  30  /  AlkPhos  75  06-16  TPro  2.3[L]  /  Alb  1.2[L]  /  TBili  <0.2  /  DBili  x   /  AST  12  /  ALT  <5  /  AlkPhos  26[L]  06-16    PT/INR - ( 17 Jun 2025 02:53 )   PT: 13.5 sec;   INR: 1.17 ratio         PTT - ( 17 Jun 2025 02:53 )  PTT:26.3 sec      Urinalysis Basic - ( 17 Jun 2025 02:53 )    Color: x / Appearance: x / SG: x / pH: x  Gluc: 119 mg/dL / Ketone: x  / Bili: x / Urobili: x   Blood: x / Protein: x / Nitrite: x   Leuk Esterase: x / RBC: x / WBC x   Sq Epi: x / Non Sq Epi: x / Bacteria: x            RADIOLOGY & ADDITIONAL TESTS:    Imaging Personally Reviewed:    Consultant(s) Notes Reviewed:      Care Discussed with Consultants/Other Providers:   Amos Pollock MD  PGY 2 Department of Internal Medicine        Patient is a 59y old  Male who presents with a chief complaint of UTI w/ bilateral nephrostomy tubes (18 Jun 2025 06:59)      SUBJECTIVE / OVERNIGHT EVENTS: Pt seen and examined. Transferred from MICU to floors overnight, no events. This morning complains of 8/10 diffuse lower abd pain similar to prior. No other complaints.       MEDICATIONS  (STANDING):  acetaminophen     Tablet .. 975 milliGRAM(s) Oral every 8 hours  bictegravir 50 mG/emtricitabine 200 mG/tenofovir alafenamide 25 mG (BIKTARVY) 1 Tablet(s) Oral daily  bisacodyl 5 milliGRAM(s) Oral every 12 hours  buPROPion XL (24-Hour) . 150 milliGRAM(s) Oral daily  cefepime   IVPB      cefepime   IVPB 2000 milliGRAM(s) IV Intermittent every 12 hours  chlorhexidine 2% Cloths 1 Application(s) Topical daily  cholecalciferol 2000 Unit(s) Oral daily  doxycycline monohydrate Capsule 100 milliGRAM(s) Oral every 12 hours  ezetimibe 10 milliGRAM(s) Oral at bedtime  metoprolol tartrate Injectable 2.5 milliGRAM(s) IV Push every 6 hours  multivitamin 1 Tablet(s) Oral daily  oxybutynin 5 milliGRAM(s) Oral every 8 hours  polyethylene glycol 3350 17 Gram(s) Oral two times a day  traZODone 50 milliGRAM(s) Oral at bedtime    MEDICATIONS  (PRN):  clonazePAM  Tablet 1 milliGRAM(s) Oral two times a day PRN for anxiety  HYDROmorphone  Injectable 1 milliGRAM(s) IV Push every 4 hours PRN Severe Pain (7 - 10)  oxyCODONE    IR 5 milliGRAM(s) Oral every 4 hours PRN Moderate Pain (4 - 6)  zolpidem 5 milliGRAM(s) Oral at bedtime PRN Insomnia      I&O's Summary    17 Jun 2025 07:01  -  18 Jun 2025 07:00  --------------------------------------------------------  IN: 0 mL / OUT: 580 mL / NET: -580 mL        Vital Signs Last 24 Hrs  T(C): 36.6 (18 Jun 2025 05:19), Max: 36.9 (17 Jun 2025 08:00)  T(F): 97.8 (18 Jun 2025 05:19), Max: 98.5 (17 Jun 2025 08:00)  HR: 113 (18 Jun 2025 05:19) (104 - 118)  BP: 123/82 (18 Jun 2025 05:19) (108/89 - 135/89)  BP(mean): 98 (17 Jun 2025 20:00) (84 - 110)  RR: 20 (18 Jun 2025 05:19) (15 - 28)  SpO2: 98% (18 Jun 2025 05:19) (95% - 100%)    Parameters below as of 18 Jun 2025 05:19  Patient On (Oxygen Delivery Method): room air        CAPILLARY BLOOD GLUCOSE          PHYSICAL EXAM:  GENERAL: NAD, lying in bed comfortably  HEAD:  Atraumatic, Normocephalic  EYES: EOMI, conjunctiva and sclera clear  ENT: Moist mucous membranes  CHEST/LUNG: Clear to auscultation bilaterally; No rales, rhonchi, wheezing, or rubs. Unlabored respirations  HEART: tachycardia and regular rhythm; No murmurs, rubs, or gallops  ABDOMEN: ostomy present near umbilicus CDI, diffuse lower abd tenderness nondistended+soft. bilateral nephrostomy tubes CDI and nontender, left bag with yellow urine + brown sediment. right bag with port wine colored output  EXTREMITIES:  No clubbing, cyanosis, or edema  NERVOUS SYSTEM:  A&Ox3, no focal deficits   SKIN: No rashes or lesions       LABS:                        7.7    7.69  )-----------( 294      ( 17 Jun 2025 20:07 )             23.0     Auto Eosinophil # 0.08  / Auto Eosinophil % 1.0   / Auto Neutrophil # 3.97  / Auto Neutrophil % 51.7  / BANDS % x                            7.5    7.67  )-----------( 285      ( 17 Jun 2025 17:00 )             22.6     Auto Eosinophil # 0.04  / Auto Eosinophil % 0.5   / Auto Neutrophil # 4.18  / Auto Neutrophil % 54.6  / BANDS % x                            8.7    10.85 )-----------( 289      ( 17 Jun 2025 02:53 )             25.7     Auto Eosinophil # x     / Auto Eosinophil % x     / Auto Neutrophil # x     / Auto Neutrophil % x     / BANDS % x        06-17    137  |  108[H]  |  19  ----------------------------<  119[H]  4.1   |  19[L]  |  1.07  06-16    138  |  106  |  19  ----------------------------<  109[H]  4.2   |  23  |  1.18  06-16    146[H]  |  128[H]  |  8   ----------------------------<  99  1.7[LL]   |  8[LL]  |  0.44[L]    Ca    8.2[L]      17 Jun 2025 02:53  Mg     2.00     06-17  Phos  2.6     06-17  TPro  5.6[L]  /  Alb  3.1[L]  /  TBili  <0.2  /  DBili  x   /  AST  17  /  ALT  24  /  AlkPhos  67  06-17  TPro  5.7[L]  /  Alb  3.1[L]  /  TBili  <0.2  /  DBili  x   /  AST  19  /  ALT  30  /  AlkPhos  75  06-16  TPro  2.3[L]  /  Alb  1.2[L]  /  TBili  <0.2  /  DBili  x   /  AST  12  /  ALT  <5  /  AlkPhos  26[L]  06-16    PT/INR - ( 17 Jun 2025 02:53 )   PT: 13.5 sec;   INR: 1.17 ratio         PTT - ( 17 Jun 2025 02:53 )  PTT:26.3 sec      Urinalysis Basic - ( 17 Jun 2025 02:53 )    Color: x / Appearance: x / SG: x / pH: x  Gluc: 119 mg/dL / Ketone: x  / Bili: x / Urobili: x   Blood: x / Protein: x / Nitrite: x   Leuk Esterase: x / RBC: x / WBC x   Sq Epi: x / Non Sq Epi: x / Bacteria: x            RADIOLOGY & ADDITIONAL TESTS:    Imaging Personally Reviewed:    Consultant(s) Notes Reviewed:      Care Discussed with Consultants/Other Providers:

## 2025-06-18 NOTE — DIETITIAN INITIAL EVALUATION ADULT - ORAL INTAKE PTA/DIET HISTORY
Patient seen for assessment. Reports poor appetite due to pain, which was going over >1 week prior to admission. No special diets or supplements taken at home. Noted w/ h/o malnutrition per prior RD notes.

## 2025-06-18 NOTE — DIETITIAN INITIAL EVALUATION ADULT - CALCULATED TO (CAL/KG)
1359 Ketoconazole Counseling:   Patient counseled regarding improving absorption with orange juice.  Adverse effects include but are not limited to breast enlargement, headache, diarrhea, nausea, upset stomach, liver function test abnormalities, taste disturbance, and stomach pain.  There is a rare possibility of liver failure that can occur when taking ketoconazole. The patient understands that monitoring of LFTs may be required, especially at baseline. The patient verbalized understanding of the proper use and possible adverse effects of ketoconazole.  All of the patient's questions and concerns were addressed.

## 2025-06-18 NOTE — PROGRESS NOTE ADULT - ATTENDING COMMENTS
59M w/ hx of prostate + anal cancer (s/p chemotherapy + radiation in 2020 c/b bilateral nephrostomy tubes and ostomy), HIV on Biktarvy, HTN, HLD, anxiety/depression presenting for bilateral flank pain and hematuria found to have positive UA and hematuria on PCUN.     #Sepsis 2/2 pyelonephritis. ID cs, cont cefepime. fu urine culture >100K GNR. BCX Neg.    #Hematuria: urology consulted, recommends pcun to pcn conversion be IR.  He was undergoing conversion of PCNU's to PCN's to prevent refluxing up the PCNU's and able to have successful conversion of the R side when patient became hypotensive, hemorrhage with clots,  s/p RRT 6/16 given hemorrhagic shock, s/p PRBC and IVF and MICU stay. 617: s/p OR with cystoscopy, clot evacuation, fulguration. Per Urology, After clot evacuation no obvious bleeding noted in the bladder and no suspicion of radiation cystitis. Patients hematuria likely coming from the upper tracts. Plan for IR renal angiogram, possible embolization, nephrostomy tube exchange 6/19. NPO after MN    #hx of secondary syphilis, prior treatment with IM Bicillin (multiple treatment), fu with ID re RPR +, but confirmatory test neg, No plan for treatment for Syphilis given +rare spirochetes in Derm Punch biopsy done outpt 6/3 per chart review.     #Acute Blood Loss Anemia: due t hematuria, s/p PRBC, s/w monitoring, hematuria still ongoing, Trend HB, PRBC if HB < 7    # HTN c/w BP meds and trend     # HIV: c/w ART    # Dvt ppx: scds .

## 2025-06-18 NOTE — DIETITIAN INITIAL EVALUATION ADULT - PROBLEM SELECTOR PLAN 2
Dark colored in bilateral nephrostomy tubes. 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    - Urology following, appreciate recs  - Maintain active T&S  - trend hgb, transfuse PRN hgb <7  - BP regulation

## 2025-06-18 NOTE — PROGRESS NOTE ADULT - SUBJECTIVE AND OBJECTIVE BOX
Post op Check    Pt seen and examined without complaints. Pain is controlled. Denies SOB/CP/N/V.     Vital Signs Last 24 Hrs  T(C): 36.6 (18 Jun 2025 05:19), Max: 36.9 (17 Jun 2025 08:00)  T(F): 97.8 (18 Jun 2025 05:19), Max: 98.5 (17 Jun 2025 08:00)  HR: 113 (18 Jun 2025 05:19) (104 - 118)  BP: 123/82 (18 Jun 2025 05:19) (108/89 - 135/89)  BP(mean): 98 (17 Jun 2025 20:00) (84 - 110)  RR: 20 (18 Jun 2025 05:19) (15 - 28)  SpO2: 98% (18 Jun 2025 05:19) (95% - 100%)    Parameters below as of 18 Jun 2025 05:19  Patient On (Oxygen Delivery Method): room air        I&O's Summary    16 Jun 2025 07:01  -  17 Jun 2025 07:00  --------------------------------------------------------  IN: 480 mL / OUT: 1470 mL / NET: -990 mL    17 Jun 2025 07:01  -  18 Jun 2025 06:02  --------------------------------------------------------  IN: 0 mL / OUT: 580 mL / NET: -580 mL        Physical Exam  Gen: NAD  Pulm: No respiratory distress  CV: RRR  Abd: Soft, NT  Back: + left nephrostomy tube with mohan urine, + right NT with blody urine  : + davis with minimal output but clear urine                          7.7    7.69  )-----------( 294      ( 17 Jun 2025 20:07 )             23.0       06-17    137  |  108[H]  |  19  ----------------------------<  119[H]  4.1   |  19[L]  |  1.07    Ca    8.2[L]      17 Jun 2025 02:53  Phos  2.6     06-17  Mg     2.00     06-17    TPro  5.6[L]  /  Alb  3.1[L]  /  TBili  <0.2  /  DBili  x   /  AST  17  /  ALT  24  /  AlkPhos  67  06-17      A/P: 59y Male s/p Cysto, clot evacuation/fulguration  DVT prophylaxis/OOB  Incentive spirometry  Strict I&O's  Analgesia and antiemetics as needed  Diet  AM labs

## 2025-06-18 NOTE — PROGRESS NOTE ADULT - PROBLEM SELECTOR PLAN 1
UA positive with bilateral nephrostomy site pain consistent with complex UTI without clinical or radiographic signs of pyelonephritis. Has a history of both sensitive and carbapenem-resistant pseudomonas. During hospital course, worsening tachycardia and pain with white count elevating.     - Zosyn (6/10PM -6/12), Cefepime 2g Q12 (6/12-  - UCx w/ >100k GNR; pending speciation, drawn from Nephrostomy tube  - ID following, appreciate recs  - standing tylenol for pain, oxycodone and IV dilaudid prn for moderate/severe pain Dark colored in bilateral nephrostomy tubes. Increased hematuria on 6/11 in R bag with clots. Confirmed clot in bladder on 6/13 KUB US.  IR converted nephroureterostomy tubes to nephrostomy tubes on 6/16 to reduce bladder irritation and allow bladder bleeding to tamponade off however c/b RRT for hypotension -> went to MICU did not require pressors   Underwent Cysto, clot evacuation, fulguration 6/18, hematuria likely coming from R kidney  - cbc q12h  - Maintain active T&S  - transfuse PRN hgb <7 or for active bleeding  -  IR will plan to perform renal angiogram, possible embolization, nephrostomy tube exchange 6/19.

## 2025-06-18 NOTE — PROGRESS NOTE ADULT - ASSESSMENT
59M w/ hx of prostate + anal cancer (s/p chemotherapy + radiation in 2020 c/b bilateral nephrostomy tubes and ostomy), HIV on Biktarvy, HTN, HLD, anxiety/depression presenting for bilateral flank pain and hematuria found to have positive UA and normal functioning nephrostomy tubes per urology suggestive of UTI. Transitioned from Zosyn to cefepime per ID recommendations given history of pseudomonas. Course complicated by port wine colored hematuria into bilateral L>R nephrourterostomy bags, further c/b RRT for hypotension, tachycardia at IR procedure for conversion of PCNUs to PCNs with c/f pseudoaneurysm so transferred to MICU, underwent cystoscopy, dilation, clot evacuation in the OR 6/17 with urology, not requiring pressors now transferred to floors for plan for IR nephrostomy exchange on 6/19 with IR

## 2025-06-18 NOTE — PROGRESS NOTE ADULT - PROBLEM SELECTOR PLAN 3
has history of secondary syphillis which was dx ~5 years ago and treated, also treated ~1 year ago. Syphilis AB+ but confirmatory negative  - syphilis AB+ but negative confirmatory. Of note pt reports having skin biopsy several weeks ago with derm -> showed rare spirochete   - s/p doxy 6/16 - 6/18, (outpatient ID provider is requesting for IV PCN course at time of discharge)  -ID following appreciate recs has history of secondary syphillis which was dx ~5 years ago and treated, also treated ~1 year ago. Syphilis AB+ but confirmatory negative  - syphilis AB+ but negative confirmatory. Had outpt skin biopsy with derm -> showed rare spirochete   - s/p doxy 6/16 - 6/18, (outpatient ID provider is requesting for IV PCN course at time of discharge)  -ID following appreciate recs

## 2025-06-18 NOTE — PROGRESS NOTE ADULT - ASSESSMENT
58 y/o M w/ PMhx of HTN, HLD, anxiety/depression, HIV on Biktarvy (CD4 624 in 12/2024 and VL UD 5/25),  h/o anal and prostate Ca (s/p chemotherapy + radiation in 2020 c/b bilateral nephrostomy tubes and ostomy),  ureteral stricture s/p stenting and bow with b/l nephroureteral tubes (last exchanged 5/9), recent parastomal hernia repair with mesh in 4/2025 admitted to [facility] on 6/10 for b/l flank pain, hematuria  Pt afebrile, initially w/o leukocytosis, now 13, UCx w/ GNR  CT A/P with b/l PCT with distal tips coiled in the bladder  D/w outpatient ID doctor Dr. 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

## 2025-06-18 NOTE — PROGRESS NOTE ADULT - PROBLEM SELECTOR PLAN 2
Dark colored in bilateral nephrostomy tubes. 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 Increased hematuria on 6/11 in R bag with clots. Per Urology nikia due to friable  tissue. Confirmed clot in bladder on 6/13 KUB US.    - IR plan to convert nephroureterostomy tubes to nephrostomy tubes on 6/16 to reduce bladder irritation and allow bladder bleeding to tamponade off  - if H&H drops suddenly -> urgent CTA A&P arterial/venous phase eval for bleed and call IR   - Maintain active T&S  - transfuse PRN hgb <7 or for active bleeding  - BP regulation (SBP < 140) UA positive with bilateral nephrostomy site pain consistent with complex UTI without clinical or radiographic signs of pyelonephritis. Has a history of both sensitive and carbapenem-resistant pseudomonas. During hospital course, worsening tachycardia and pain with white count elevating.     - Zosyn (6/10PM -6/12), Cefepime 2g Q12 (6/12-  - UCx w/ >100k GNR; pending speciation, drawn from Nephrostomy tube  - ID following, appreciate recs  - standing tylenol for pain, oxycodone and IV dilaudid prn for moderate/severe pain UA positive with bilateral nephrostomy site pain consistent with complex UTI without clinical or radiographic signs of pyelonephritis. Has a history of both sensitive and carbapenem-resistant pseudomonas. During hospital course, worsening tachycardia and pain with white count elevating.     - Zosyn (6/10PM -6/12), Cefepime 2g Q12 (6/12-  - UCx w/ >100k GNR; pending speciation, drawn from Nephrostomy tube  - ID following, appreciate recs  - standing tylenol for pain, oxycodone and IV dilaudid prn for moderate/severe pain  -palliative consulted for assistance with pain management given increasing IV dilaudid needs

## 2025-06-18 NOTE — DIETITIAN INITIAL EVALUATION ADULT - PERTINENT LABORATORY DATA
06-18    139  |  107  |  17  ----------------------------<  103[H]  3.9   |  23  |  0.94    Ca    8.2[L]      18 Jun 2025 06:50  Phos  2.4     06-18  Mg     2.00     06-18    TPro  5.4[L]  /  Alb  3.1[L]  /  TBili  <0.2  /  DBili  x   /  AST  13  /  ALT  16  /  AlkPhos  63  06-18  A1C with Estimated Average Glucose Result: 5.9 % (04-07-25 @ 11:45)

## 2025-06-18 NOTE — PROGRESS NOTE ADULT - PROBLEM SELECTOR PLAN 4
On diltiazem 240mg and metoprolol [id number] at home. Follows with cardiology but has no documented tachyarrhythmia history. Not septic during this admission.    - Continue home diltiazem 240 mg  - Continue home BB as Lopressor 50mg BID  - inpatient BP goals: SBP <140 (given hematuria) On diltiazem 240mg and metoprolol [id number] at home. Follows with cardiology but has no documented tachyarrhythmia history. Not septic during this admission.    - Continue home diltiazem 240 mg  - Continue home BB as IV lopressor 2.5mg q6h   - inpatient BP goals: SBP <140 (given hematuria)

## 2025-06-18 NOTE — DIETITIAN INITIAL EVALUATION ADULT - NS FNS DIET ORDER
Diet, NPO after Midnight:      NPO Start Date: 18-Jun-2025,   NPO Start Time: 23:59 (06-18-25 @ 07:31)  Diet, Regular (06-18-25 @ 07:30)

## 2025-06-18 NOTE — DIETITIAN INITIAL EVALUATION ADULT - ADD RECOMMEND
- Continue diet as ordered  - Continue w/ multivitamin  - Monitor PO intake, tolerance to diet/supplement, nutrition related lab values, weight trends, BMs/GI distress, hydration status, skin integrity  - Continue diet as ordered  - Continue w/ multivitamin  - Consider d/c bowel regimen due to diarrhea  - Monitor PO intake, tolerance to diet/supplement, nutrition related lab values, weight trends, BMs/GI distress, hydration status, skin integrity

## 2025-06-18 NOTE — PROGRESS NOTE ADULT - ASSESSMENT
58 yo M w/ PMHx of HIV on Biktarvy, HTN, prostate cancer and anal cancer s/p chemotherapy/radiation c/b b/l nephrostomy tubes (exchanged on 5/9 by IR) and ostomy presents for a few days of bilateral lower back/flank pain and bloody urine/passage of clots since 6 PM 6/9/25. Urology consulted for hematuria and bilateral flank pain. Patient is AVSS, WBC 8.3, Hct 36.6, Cr 0.8. UA positive. CTAP demonstrates bilateral nephroureteral tubes in appropriate position with no hydronephrosis bilaterally. Bladder is decompressed and prostate is unremarkable.     Hematuria can sometimes occur in setting of UTI and can be intermittent for as long as nephroureteral stents are in place. Cola colored urine will persist as old clot is lysed in the urine. Bilateral flank pain and burning with urination may be 2/2 cystitis/ ascending UTI and may improve w antibiotics. No intervention currently indicated as patient continues to void appropriately with decompressed bladder, normal H/H, normal Cr, and no hydronephrosis bilaterally    6/13: RBUS shows bladder with clot  6/14: Hct stable 27 from 30, Cr 1 from 1.1, comfortable, NU's draining maroon  6/15: comfortably, NU's draining maroon/brown. IR plans to convert PCNU's to PCNs under local on Monday 6/16    Recommendations:  - s/p OR with cystoscopy, clot evacuation, fulguration. After clot evacuation no obvious bleeding noted in the bladder and no suspisicion of radiation cystitis. Patients hematuria likely coming from the upper tracts.  - Leave catheter in place  - Today davis is draining clear, R PCN is maroon, and L PCNU is clear. Bleeding is coming from R kidney, patient is clinically stable at the moment but would likely benefit from R renal angio possible embo to evaluate source of R renal bleed  - Trend H/H    D/w Dr. Kiran  Urology  [id number]

## 2025-06-18 NOTE — PROGRESS NOTE ADULT - PROBLEM SELECTOR PLAN 5
Sinus tach to 130s on 6/12 ekg w/ sinus tach. Likely iso pain vs. bleed vs. worsening sepsis +/- reflex from holding Metoprolol.     - Encourage home Klonopin use per patient usual schedule to avoid mild withdrawal symptoms  - Pain management and infection control   - plan per hematuria.   - Restarted BB as Lopressor 50mg BID  - Cont home diltiazem 240mg daily Sinus tach to 130s on 6/12 ekg w/ sinus tach. Likely iso pain vs. bleed vs. worsening sepsis +/- reflex from holding Metoprolol.     - Encourage home Klonopin use per patient usual schedule to avoid mild withdrawal symptoms  - Pain management and infection control   - plan per hematuria.   - Restarted BB as IV lopressor 2.5mg q6h   - Cont home diltiazem 240mg daily

## 2025-06-18 NOTE — PROGRESS NOTE ADULT - SUBJECTIVE AND OBJECTIVE BOX
Subjective  Denies fevers, chills, nausea, vomiting, SOB, CP. Patients davis catheter draining clear yellow, R PCN draining maroon, and L PCNU draining clear  Objective    Vital signs  T(F): , Max: 98.5 (06-17-25 @ 08:00)  HR: 113 (06-18-25 @ 05:19)  BP: 123/82 (06-18-25 @ 05:19)  SpO2: 98% (06-18-25 @ 05:19)  Wt(kg): --    Output     OUT:    Nephrostomy Tube (mL): 920 mL    Nephrostomy Tube (mL): 350 mL  Total OUT: 1270 mL    Total NET: -1270 mL      OUT:    Nephrostomy Tube (mL): 150 mL    Nephrostomy Tube (mL): 230 mL    Voided (mL): 200 mL  Total OUT: 580 mL    Total NET: -580 mL          Gen: NAD  Abd: soft, nontender, nondistended  : davis secured in place, draining CYU, R PCN draining maroon, L PCNU draining clear    Labs      06-17 @ 20:07    WBC 7.69  / Hct 23.0  / SCr --       06-17 @ 17:00    WBC 7.67  / Hct 22.6  / SCr --           Culture - Blood (collected 06-12-25 @ 18:07)  Source: Blood Blood  Final Report (06-17-25 @ 22:00):    No growth at 5 days    Culture - Blood (collected 06-12-25 @ 17:52)  Source: Blood Blood  Final Report (06-17-25 @ 22:00):    No growth at 5 days        Urine Cx: ?  Blood Cx: ?    Imaging

## 2025-06-19 LAB
ANION GAP SERPL CALC-SCNC: 10 MMOL/L — SIGNIFICANT CHANGE UP (ref 7–14)
APTT BLD: 28.2 SEC — SIGNIFICANT CHANGE UP (ref 26.1–36.8)
BASOPHILS # BLD AUTO: 0.06 K/UL — SIGNIFICANT CHANGE UP (ref 0–0.2)
BASOPHILS NFR BLD AUTO: 0.7 % — SIGNIFICANT CHANGE UP (ref 0–2)
BLD GP AB SCN SERPL QL: NEGATIVE — SIGNIFICANT CHANGE UP
BUN SERPL-MCNC: 13 MG/DL — SIGNIFICANT CHANGE UP (ref 7–23)
CALCIUM SERPL-MCNC: 8.4 MG/DL — SIGNIFICANT CHANGE UP (ref 8.4–10.5)
CHLORIDE SERPL-SCNC: 107 MMOL/L — SIGNIFICANT CHANGE UP (ref 98–107)
CO2 SERPL-SCNC: 22 MMOL/L — SIGNIFICANT CHANGE UP (ref 22–31)
CREAT SERPL-MCNC: 0.89 MG/DL — SIGNIFICANT CHANGE UP (ref 0.5–1.3)
EGFR: 99 ML/MIN/1.73M2 — SIGNIFICANT CHANGE UP
EGFR: 99 ML/MIN/1.73M2 — SIGNIFICANT CHANGE UP
EOSINOPHIL # BLD AUTO: 0.12 K/UL — SIGNIFICANT CHANGE UP (ref 0–0.5)
EOSINOPHIL NFR BLD AUTO: 1.4 % — SIGNIFICANT CHANGE UP (ref 0–6)
GLUCOSE SERPL-MCNC: 91 MG/DL — SIGNIFICANT CHANGE UP (ref 70–99)
HCT VFR BLD CALC: 24 % — LOW (ref 39–50)
HGB BLD-MCNC: 7.9 G/DL — LOW (ref 13–17)
IMM GRANULOCYTES # BLD AUTO: 0.16 K/UL — HIGH (ref 0–0.07)
IMM GRANULOCYTES NFR BLD AUTO: 1.8 % — HIGH (ref 0–0.9)
INR BLD: 1.16 RATIO — SIGNIFICANT CHANGE UP (ref 0.85–1.16)
LYMPHOCYTES # BLD AUTO: 2.01 K/UL — SIGNIFICANT CHANGE UP (ref 1–3.3)
LYMPHOCYTES NFR BLD AUTO: 23.1 % — SIGNIFICANT CHANGE UP (ref 13–44)
MAGNESIUM SERPL-MCNC: 1.9 MG/DL — SIGNIFICANT CHANGE UP (ref 1.6–2.6)
MCHC RBC-ENTMCNC: 29.4 PG — SIGNIFICANT CHANGE UP (ref 27–34)
MCHC RBC-ENTMCNC: 32.9 G/DL — SIGNIFICANT CHANGE UP (ref 32–36)
MCV RBC AUTO: 89.2 FL — SIGNIFICANT CHANGE UP (ref 80–100)
MONOCYTES # BLD AUTO: 0.96 K/UL — HIGH (ref 0–0.9)
MONOCYTES NFR BLD AUTO: 11 % — SIGNIFICANT CHANGE UP (ref 2–14)
MRSA PCR RESULT.: SIGNIFICANT CHANGE UP
NEUTROPHILS # BLD AUTO: 5.4 K/UL — SIGNIFICANT CHANGE UP (ref 1.8–7.4)
NEUTROPHILS NFR BLD AUTO: 62 % — SIGNIFICANT CHANGE UP (ref 43–77)
NRBC # BLD AUTO: 0.03 K/UL — HIGH (ref 0–0)
NRBC # FLD: 0.03 K/UL — HIGH (ref 0–0)
NRBC BLD AUTO-RTO: 0 /100 WBCS — SIGNIFICANT CHANGE UP (ref 0–0)
PHOSPHATE SERPL-MCNC: 2.9 MG/DL — SIGNIFICANT CHANGE UP (ref 2.5–4.5)
PLATELET # BLD AUTO: 314 K/UL — SIGNIFICANT CHANGE UP (ref 150–400)
PMV BLD: 8.7 FL — SIGNIFICANT CHANGE UP (ref 7–13)
POTASSIUM SERPL-MCNC: 4 MMOL/L — SIGNIFICANT CHANGE UP (ref 3.5–5.3)
POTASSIUM SERPL-SCNC: 4 MMOL/L — SIGNIFICANT CHANGE UP (ref 3.5–5.3)
PROTHROM AB SERPL-ACNC: 13.4 SEC — SIGNIFICANT CHANGE UP (ref 9.9–13.4)
RBC # BLD: 2.69 M/UL — LOW (ref 4.2–5.8)
RBC # FLD: 17.4 % — HIGH (ref 10.3–14.5)
RH IG SCN BLD-IMP: NEGATIVE — SIGNIFICANT CHANGE UP
S AUREUS DNA NOSE QL NAA+PROBE: SIGNIFICANT CHANGE UP
SODIUM SERPL-SCNC: 139 MMOL/L — SIGNIFICANT CHANGE UP (ref 135–145)
WBC # BLD: 8.71 K/UL — SIGNIFICANT CHANGE UP (ref 3.8–10.5)
WBC # FLD AUTO: 8.71 K/UL — SIGNIFICANT CHANGE UP (ref 3.8–10.5)

## 2025-06-19 PROCEDURE — 36253 INS CATH REN ART 2ND+ UNILAT: CPT | Mod: RT

## 2025-06-19 PROCEDURE — 99232 SBSQ HOSP IP/OBS MODERATE 35: CPT

## 2025-06-19 PROCEDURE — 50435 EXCHANGE NEPHROSTOMY CATH: CPT | Mod: RT

## 2025-06-19 PROCEDURE — 76937 US GUIDE VASCULAR ACCESS: CPT | Mod: 26

## 2025-06-19 PROCEDURE — 99233 SBSQ HOSP IP/OBS HIGH 50: CPT | Mod: GC

## 2025-06-19 RX ADMIN — Medication 50 MILLIGRAM(S): at 22:18

## 2025-06-19 RX ADMIN — Medication 1 TABLET(S): at 16:56

## 2025-06-19 RX ADMIN — CEFEPIME 100 MILLIGRAM(S): 2 INJECTION, POWDER, FOR SOLUTION INTRAVENOUS at 17:48

## 2025-06-19 RX ADMIN — Medication 1.25 MILLIGRAM(S): at 17:46

## 2025-06-19 RX ADMIN — Medication 1.25 MILLIGRAM(S): at 08:15

## 2025-06-19 RX ADMIN — Medication 1.25 MILLIGRAM(S): at 00:00

## 2025-06-19 RX ADMIN — Medication 1.25 MILLIGRAM(S): at 12:27

## 2025-06-19 RX ADMIN — Medication 5 MILLIGRAM(S): at 17:46

## 2025-06-19 RX ADMIN — Medication 1.25 MILLIGRAM(S): at 22:15

## 2025-06-19 RX ADMIN — Medication 1.25 MILLIGRAM(S): at 02:08

## 2025-06-19 RX ADMIN — OXYBUTYNIN CHLORIDE 5 MILLIGRAM(S): 5 TABLET, FILM COATED, EXTENDED RELEASE ORAL at 16:56

## 2025-06-19 RX ADMIN — EZETIMIBE 10 MILLIGRAM(S): 10 TABLET ORAL at 21:37

## 2025-06-19 RX ADMIN — BICTEGRAVIR SODIUM, EMTRICITABINE, AND TENOFOVIR ALAFENAMIDE FUMARATE 1 TABLET(S): 50; 200; 25 TABLET ORAL at 17:47

## 2025-06-19 RX ADMIN — BUPROPION HYDROBROMIDE 150 MILLIGRAM(S): 522 TABLET, EXTENDED RELEASE ORAL at 17:49

## 2025-06-19 RX ADMIN — Medication 1.25 MILLIGRAM(S): at 02:25

## 2025-06-19 RX ADMIN — CEFEPIME 100 MILLIGRAM(S): 2 INJECTION, POWDER, FOR SOLUTION INTRAVENOUS at 05:11

## 2025-06-19 RX ADMIN — OXYBUTYNIN CHLORIDE 5 MILLIGRAM(S): 5 TABLET, FILM COATED, EXTENDED RELEASE ORAL at 22:18

## 2025-06-19 RX ADMIN — Medication 1.25 MILLIGRAM(S): at 18:01

## 2025-06-19 RX ADMIN — Medication 1.25 MILLIGRAM(S): at 08:30

## 2025-06-19 RX ADMIN — Medication 2000 UNIT(S): at 16:56

## 2025-06-19 RX ADMIN — Medication 975 MILLIGRAM(S): at 14:23

## 2025-06-19 RX ADMIN — Medication 1 APPLICATION(S): at 17:03

## 2025-06-19 RX ADMIN — Medication 975 MILLIGRAM(S): at 13:49

## 2025-06-19 NOTE — CHART NOTE - NSCHARTNOTEFT_GEN_A_CORE
PRE-INTERVENTIONAL RADIOLOGY PROCEDURE NOTE      Patient Age: 59y    Patient Gender: Male    Procedure: nephrostomy exchange    Interventional Radiology Attending Physician: Latasha    Ordering Attending Physician: Francisco    Pertinent Medical History: prostate ca, hematuria    Pertinent labs:                      7.9    8.71  )-----------( 314      ( 19 Jun 2025 04:20 )             24.0       06-19    139  |  107  |  13  ----------------------------<  91  4.0   |  22  |  0.89    Ca    8.4      19 Jun 2025 04:20  Phos  2.9     06-19  Mg     1.90     06-19    TPro  5.4[L]  /  Alb  3.1[L]  /  TBili  <0.2  /  DBili  x   /  AST  13  /  ALT  16  /  AlkPhos  63  06-18      PT/INR - ( 19 Jun 2025 04:20 )   PT: 13.4 sec;   INR: 1.16 ratio         PTT - ( 19 Jun 2025 04:20 )  PTT:28.2 sec        Patient and Family Aware ? Yes PRE-INTERVENTIONAL RADIOLOGY PROCEDURE NOTE      Patient Age: 59y    Patient Gender: Male    Procedure: nephrostomy exchange, angiogram and possible embolization    Interventional Radiology Attending Physician: Latasha    Ordering Attending Physician: Francisco    Pertinent Medical History: prostate ca, hematuria    Pertinent labs:                      7.9    8.71  )-----------( 314      ( 19 Jun 2025 04:20 )             24.0       06-19    139  |  107  |  13  ----------------------------<  91  4.0   |  22  |  0.89    Ca    8.4      19 Jun 2025 04:20  Phos  2.9     06-19  Mg     1.90     06-19    TPro  5.4[L]  /  Alb  3.1[L]  /  TBili  <0.2  /  DBili  x   /  AST  13  /  ALT  16  /  AlkPhos  63  06-18      PT/INR - ( 19 Jun 2025 04:20 )   PT: 13.4 sec;   INR: 1.16 ratio         PTT - ( 19 Jun 2025 04:20 )  PTT:28.2 sec        Patient and Family Aware ? Yes

## 2025-06-19 NOTE — CONSULT NOTE ADULT - SUBJECTIVE AND OBJECTIVE BOX
HPI:  59M w/ hx of prostate + anal cancer (s/p chemotherapy + radiation in 2020 c/b bilateral nephrostomy tubes and ostomy), HIV on Biktarvy, HTN, HLD, anxiety/depression presenting for bilateral flank pain and hematuria.  Patient reports that he had a abdominal hernia mesh last month in May around his ostomy site. For the last two weeks he has had pain that started at the stoma which then progressed to Right then left bilateral flank pain at his nephrostomy tube insertion sites. Patient came to the ED when he developed bloody output into both nephrostomy tubes (at baseline is normal yellow urine color) and pain was no longer managable with Tylenol. Patient denies any fevers chills, weight loss, SOB, URI symptoms, leg swelling.  Patient follows with urologist Dr. Mathew, oncologist Dr. Andres Funez, and a surgeon.   Of note, patient was admitted  one month prior for poor nephrostomy drainage and urosepsis. At baseline he has no urine output through the penis but he will urinate through the penis if one or more tubes become blocked.    ED Course:  Afebrile and vs unremarkable on RA. Seen by urology who felt flow was appropriate and recommended medical management. CBC showing hgb 12.5. CMP wnl. UA sent from both Left (first sample per ED noted) and Right nephrostomy tubes with high SG, moderate-Large blood, +leuk esterase, >50 WBC, and few bacteria. CT A/p showing bilateral nephroureteral tubes in place with decompressed bladder. Was given 1g ceftriaxone, Ofirmev, and morphine. (10 Al 2025 14:10)    PERTINENT PM/SXH:   DM (diabetes mellitus)    HTN (hypertension)    HLD (hyperlipidemia)    HIV infection    Depressive disorder    Anxiety    Prostate cancer    Anal cancer    Diverticulosis    Lung cancer    Unspecified abdominal pain    Abdominal pain    BPH (benign prostatic hyperplasia)    Insomnia    History of anal cancer    History of prostate cancer    HLD (hyperlipidemia)    Parastomal hernia with obstruction and without gangrene      No significant past surgical history    Anal lesion    S/P colostomy    History of gastroscopy    Ureteral stent present    Nephrostomy present      FAMILY HISTORY:  FH: prostate cancer    FH: breast cancer      Family Hx substance abuse [ ]yes [ ]no  ITEMS NOT CHECKED ARE NOT PRESENT    SOCIAL HISTORY:   Significant other/partner[ ]  Children[ ]  Sabianist/Spirituality:  Substance hx:  [ ]   Tobacco hx:  [ ]   Alcohol hx: [ ]   Home Opioid hx:  [x ] I-Stop Reference No: [id number]  Living Situation: [ ]Home  [ ]Long term care  [ ]Rehab [ ]Other      PDI	Current Rx	Drug Type	Rx Written	Rx Dispensed	Drug	Quantity	Days Supply	Prescriber Name	Prescriber NEFTALY #	Payment Method	Dispenser  A	Y		04/01/2025	05/31/2025	vireo whole flower 0.0 mg - 1mg thc:<0.5mg cbd/inh	1	24	OsewaBruceatokunbo	[id number]	[facility]  A	Y		04/01/2025	05/31/2025	vireo whole flower 0.0 mg - 1mg thc:<0.5mg cbd/inh	1	24	OsewaMarty	[id number]	[facility]  C	Y		04/24/2025	05/26/2025	zolpidem tartrate 10 mg tablet	30	30	Marii Becerra	[id number]	Medicaid	[id number]  C	Y	B	05/22/2025	05/26/2025	clonazepam 1 mg tablet	120	30	Marii Becerra	[id number]	Medicaid	[id number]  C	N	B	04/24/2025	04/28/2025	clonazepam 1 mg tablet	120	30	Marii Becerra	[id number]	Medicaid	[id number]  C	N		04/24/2025	04/25/2025	zolpidem tartrate 10 mg tablet	30	30	Marii Becerra	[id number]	Medicaid	[id number]  A	N		04/01/2025	04/23/2025	vireo whole flower 0.0 mg - 1mg thc:<0.5mg cbd/inh	2	48	OsgalloaMarty	[id number]	[facility]  C	N	O	04/12/2025	04/13/2025	hydromorphone 4 mg tablet	28	7	[facility]	[id number]	Medicaid	[id number]  C	N	O	04/07/2025	04/08/2025	oxycodone hcl (ir) 5 mg tablet	5	2	Kathrine Recio	[id number]	Medicaid	[id number]  B	N		04/01/2025	04/01/2025	matter whole flower 7.1mg thc – 8mg thc:<0.5mg cbd/3 sec inh	1	7	RadhaMarty	[id number]	[facility]  C	N	B	03/06/2025	03/31/2025	clonazepam 1 mg tablet	120	30	Marii Becerra	[id number]	Medicaid	[id number]  C	N		03/06/2025	03/10/2025	zolpidem tartrate 10 mg tablet	30	30	Marii Becerra	[id number]	Medicaid	[id number]  A	N		03/05/2024	02/28/2025	vireo whole flower 0.0 mg - 1mg thc:<0.5mg cbd/inh	1	24	LoganJeffrey	[id number]	[facility]  A	N		03/05/2024	02/28/2025	vireo whole flower 0.0 mg - 1mg thc:<0.5mg cbd/inh	1	24	Logan, Jeffrey	[id number]	[facility]  C	N	B	02/06/2025	02/08/2025	clonazepam 1 mg tablet	120	30	Marii Becerra	[id number]	Medicaid	[id number]  C	N		09/03/2024	02/05/2025	zolpidem tartrate 10 mg tablet	30	30	Hanh Yin	[id number]	Medicaid	[id number]  A	N		03/05/2024	01/30/2025	vireo whole flower 0.0 mg - 1mg thc:<0.5mg cbd/inh	2	48	LoganJovitaJeffrey	[id number]	[facility]  C	N		09/03/2024	01/06/2025	zolpidem tartrate 10 mg tablet	30	30	Hanh Yin	[id number]	Medicaid	[id number]  A	N		03/05/2024	12/28/2024	vireo whole flower 0.0 mg - 1mg thc:<0.5mg cbd/inh	4	48	LoganJovitaJeffrey	[id number]	[facility]  C	N	B	11/21/2024	12/16/2024	clonazepam 1 mg tablet	120	30	Aida Becerrad	[id number]	Medicaid	[id number]  C	N		09/03/2024	12/09/2024	zolpidem tartrate 10 mg tablet	30	30	Hanh Yin	[id number]	Medicaid	[id number]  C	N		09/03/2024	11/07/2024	zolpidem tartrate 10 mg tablet	30	30	Hanh Yin	[id number]	Medicaid	[id number]  C	N	B	10/24/2024	10/24/2024	clonazepam 1 mg tablet	120	30	Aida Becerrad	[id number]	Medicaid	[id number]  B	N		09/03/2024	10/09/2024	zolpidem tartrate 10 mg tablet	30	30	Hanh Yin	[id number]	Medicaid	[id number]  B	N		03/05/2024	10/07/2024	curaleaf whole flower 3.1mg ? 4.0mg thc:<0.5mg cbd/inh	2	8	Jeffrey Logan	[id number]	[facility]  B	N		03/05/2024	10/07/2024	pharmacann whole flower 5.7mg ? 7.1mg thc:<0.5mg cbd/inh	7	14	Jeffrey Logan	[id number]	[facility]  B	N	B	09/26/2024	09/26/2024	clonazepam 1 mg tablet	90	30	Aida Becerrad	[id number]	Medicaid	[id number]  B	N		09/03/2024	09/11/2024	zolpidem tartrate 10 mg tablet	30	30	Hanh Yin	[id number]	Medicaid	[id number]  B	N		03/05/2024	09/11/2024	matter whole flower 3.1mg thc – 4mg thc:<0.5mg cbd/3 sec inh	2	14	Jeffrey Logan	[id number]	[facility]  B	N		03/05/2024	09/11/2024	curaleaf (1:20) 0.24mgthc and 5 mgcbd/0.5 ml tct unflavored	1	5	Jeffrey Logan	[id number]	[facility]  B	N	B	09/03/2024	09/05/2024	alprazolam 2 mg tablet	60	30	Hanh Yin	[id number]	Medicaid	[id number]  B	N		04/16/2024	08/12/2024	zolpidem tartrate 10 mg tablet	30	30	Hanh Yin	[id number]	Medicaid	[id number]  B	N	B	08/06/2024	08/07/2024	alprazolam 2 mg tablet	60	30	Hanh Yin	[id number]	Medicaid	[id number]  A	N		03/05/2024	08/01/2024	vireo whole flower 0.0 mg - 1mg thc:<0.5mg cbd/inh	1	24	Logan, Jeffrey	[id number]	[facility]  A	N		03/05/2024	07/29/2024	vireo whole flower 0.0 mg - 1mg thc:<0.5mg cbd/inh	8	24	LoganJeffrey	[id number]	[facility]  B	N		04/16/2024	07/12/2024	zolpidem tartrate 10 mg tablet	30	30	Hanh Yin	[id number]	Medicaid	[id number]  A	N		03/05/2024	07/03/2024	vireo whole flower 0.0 mg - 1mg thc:<0.5mg cbd/inh	4	24	LoganJeffrey	[id number]	[facility]    ADVANCE DIRECTIVES:    DNR/MOLST  [ ]  Living Will  [ ]   DECISION MAKER(s):  [ ] Health Care Proxy(s)  [ ] Surrogate(s)  [ ] Guardian           Name(s): Phone Number(s):    BASELINE (I)ADL(s) (prior to admission):  [place]: [ ]Total  [ ] Moderate [ ]Dependent    Allergies    No Known Allergies    Intolerances    MEDICATIONS  (STANDING):  acetaminophen     Tablet .. 975 milliGRAM(s) Oral every 8 hours  bictegravir 50 mG/emtricitabine 200 mG/tenofovir alafenamide 25 mG (BIKTARVY) 1 Tablet(s) Oral daily  bisacodyl 5 milliGRAM(s) Oral every 12 hours  buPROPion XL (24-Hour) . 150 milliGRAM(s) Oral daily  cefepime   IVPB      cefepime   IVPB 2000 milliGRAM(s) IV Intermittent every 12 hours  chlorhexidine 2% Cloths 1 Application(s) Topical daily  cholecalciferol 2000 Unit(s) Oral daily  ezetimibe 10 milliGRAM(s) Oral at bedtime  multivitamin 1 Tablet(s) Oral daily  oxybutynin 5 milliGRAM(s) Oral every 8 hours  polyethylene glycol 3350 17 Gram(s) Oral two times a day  traZODone 50 milliGRAM(s) Oral at bedtime    MEDICATIONS  (PRN):  clonazePAM  Tablet 1 milliGRAM(s) Oral two times a day PRN for anxiety  HYDROmorphone  Injectable 1.25 milliGRAM(s) IV Push every 4 hours PRN Severe Pain (7 - 10)  oxyCODONE    IR 5 milliGRAM(s) Oral every 4 hours PRN Moderate Pain (4 - 6)  zolpidem 5 milliGRAM(s) Oral at bedtime PRN Insomnia    PRESENT SYMPTOMS: [ ]Unable to self-report  [ ] CPOT [ ] PAINADs [ ] RDOS  Source if other than patient:  [ ]Family   [ ]Team     Pain: [ ]yes [ ]no  QOL impact -   Location -                    Aggravating factors -  Quality -  Radiation -  Timing-  Severity (0-10 scale):  Minimal acceptable level (0-10 scale):     CPOT:    https://www.[facility].org/getattachment/[email or web address]-5y2g-3153g8726z4c/Critical-Care-Pain-Observation-Tool-(CPOT)    PAIN AD Score:   http://geriatrictoolkit.[place].[facility]/cog/painad.pdf (press ctrl +  left click to view)    Dyspnea:                           [ ]Mild [ ]Moderate [ ]Severe      RDOS:  0 to 2  minimal or no respiratory distress   3  mild distress  4 to 6 moderate distress  >7 severe distress  https://homecareinformation.net/handouts/hen/Respiratory_Distress_Observation_Scale.pdf (Ctrl +  left click to view)     Anxiety:                             [ ]Mild [ ]Moderate [ ]Severe  Fatigue:                             [ ]Mild [ ]Moderate [ ]Severe  Nausea:                             [ ]Mild [ ]Moderate [ ]Severe  Loss of appetite:              [ ]Mild [ ]Moderate [ ]Severe  Constipation:                    [ ]Mild [ ]Moderate [ ]Severe    PCSSQ[Palliative Care Spiritual Screening Question]   Severity (0-10):  Score of 4 or > indicate consideration of Chaplaincy referral.  Chaplaincy Referral: [ ] yes [ ] refused [ ] following [ ] Deferred     Caregiver [place]? : [ ] yes [ ] no [ ] Deferred [ ] Declined             Social work referral [ ] Patient & Family Centered Care Referral [ ]     Anticipatory Grief present?:  [ ] yes [ ] no  [ ] Deferred                  Social work referral [ ] Chaplaincy Referral[ ]      Other Symptoms:  [ ]All other review of systems negative     Palliative Performance Status Version 2:         %    http://npcrc.org/files/news/palliative_performance_scale_ppsv2.pdf  PHYSICAL EXAM:  Vital Signs Last 24 Hrs  T(C): 36.6 (19 Jun 2025 09:29), Max: 37.3 (18 Jun 2025 21:37)  T(F): 97.8 (19 Jun 2025 09:29), Max: 99.1 (18 Jun 2025 21:37)  HR: 111 (19 Jun 2025 09:29) (101 - 116)  BP: 118/82 (19 Jun 2025 09:29) (101/62 - 128/72)  BP(mean): --  RR: 18 (19 Jun 2025 09:29) (17 - 18)  SpO2: 97% (19 Jun 2025 09:29) (97% - 99%)    Parameters below as of 19 Jun 2025 09:29  Patient On (Oxygen Delivery Method): room air     I&O's Summary    18 Jun 2025 07:01  -  19 Jun 2025 07:00  --------------------------------------------------------  IN: 120 mL / OUT: 1770 mL / NET: -1650 mL    19 Jun 2025 07:01  -  19 Jun 2025 10:20  --------------------------------------------------------  IN: 0 mL / OUT: 545 mL / NET: -545 mL      GENERAL: [ ]Cachexia    [ ]Alert  [ ]Oriented x   [ ]Lethargic  [ ]Unarousable  [ ]Verbal  [ ]Non-Verbal  Behavioral:   [ ] Anxiety  [ ] Delirium [ ] Agitation [ ] Other  HEENT:  [ ]Normal   [ ]Dry mouth   [ ]ET Tube/Trach  [ ]Oral lesions  PULMONARY:   [ ]Clear [ ]Tachypnea  [ ]Audible excessive secretions   [ ]Rhonchi        [ ]Right [ ]Left [ ]Bilateral  [ ]Crackles        [ ]Right [ ]Left [ ]Bilateral  [ ]Wheezing     [ ]Right [ ]Left [ ]Bilateral  [ ]Diminished breath sounds [ ]right [ ]left [ ]bilateral  CARDIOVASCULAR:    [ ]Regular [ ]Irregular [ ]Tachy  [ ]Vladimir [ ]Murmur [ ]Other  GASTROINTESTINAL:  [ ]Soft  [ ]Distended   [ ]+BS  [ ]Non tender [ ]Tender  [ ]Other [ ]PEG [ ]OGT/ NGT  Last BM:  GENITOURINARY:  [ ]Normal [ ] Incontinent   [ ]Oliguria/Anuria   [ ]Sahu  MUSCULOSKELETAL:   [ ]Normal   [ ]Weakness  [ ]Bed/Wheelchair bound [ ]Edema  NEUROLOGIC:   [ ]No focal deficits  [ ]Cognitive impairment  [ ]Dysphagia [ ]Dysarthria [ ]Paresis [ ]Other   SKIN:   [ ]Normal  [ ]Rash  [ ]Other  [ ]Pressure ulcer(s)       Present on admission [ ]y [ ]n    CRITICAL CARE:  [ ] Shock Present  [ ]Septic [ ]Cardiogenic [ ]Neurologic [ ]Hypovolemic  [ ]  Vasopressors [ ]  Inotropes   [ ]Respiratory failure present [ ]Mechanical ventilation [ ]Non-invasive ventilatory support [ ]High flow    [ ]Acute  [ ]Chronic [ ]Hypoxic  [ ]Hypercarbic [ ]Other  [ ]Other organ failure     LABS:                        7.9    8.71  )-----------( 314      ( 19 Jun 2025 04:20 )             24.0   06-19    139  |  107  |  13  ----------------------------<  91  4.0   |  22  |  0.89    Ca    8.4      19 Jun 2025 04:20  Phos  2.9     06-19  Mg     1.90     06-19    TPro  5.4[L]  /  Alb  3.1[L]  /  TBili  <0.2  /  DBili  x   /  AST  13  /  ALT  16  /  AlkPhos  63  06-18  PT/INR - ( 19 Jun 2025 04:20 )   PT: 13.4 sec;   INR: 1.16 ratio         PTT - ( 19 Jun 2025 04:20 )  PTT:28.2 sec    Urinalysis Basic - ( 19 Jun 2025 04:20 )    Color: x / Appearance: x / SG: x / pH: x  Gluc: 91 mg/dL / Ketone: x  / Bili: x / Urobili: x   Blood: x / Protein: x / Nitrite: x   Leuk Esterase: x / RBC: x / WBC x   Sq Epi: x / Non Sq Epi: x / Bacteria: x      RADIOLOGY & ADDITIONAL STUDIES:  US kidney and bladder   IMPRESSION:  Mild right hydronephrosis with possible hemorrhagic fluid.    Urinary bladder likely filled with clot.    CTA a/p  IMPRESSION:  1.  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  2.  Right distal ureter appears to be obstructed with blood clot.   Additional hematoma in the right proximal urinary collecting system and   urinary bladder. No evidence of active extravasation.        PROTEIN CALORIE MALNUTRITION PRESENT: [ ]mild [ ]moderate [ ]severe [ ]underweight [ ]morbid obesity  https://www.andeal.org/vault/2440/web/files/ONC/Table_Clinical%20Characteristics%20to%20Document%20Malnutrition-White%20JV%20et%20al%202012.pdf    Height (cm): 180.3 (06-17-25 @ 23:30), 180.3 (05-09-25 @ 19:38), 180.3 (05-09-25 @ 12:52)  Weight (kg): 88.3 (06-17-25 @ 23:30), 83.9 (05-09-25 @ 19:38), 83.5 (05-09-25 @ 12:52)  BMI (kg/m2): 27.2 (06-17-25 @ 23:30), 25.8 (05-09-25 @ 19:38), 25.7 (05-09-25 @ 12:52)    [ ]PPSV2 < or = to 30% [ ]significant weight loss  [ ]poor nutritional intake  [ ]anasarca[ ]Artificial Nutrition      Other REFERRALS:  [ ]Hospice  [ ]Child Life  [ ]Social Work  [ ]Case management [ ]Holistic Therapy     Goals of Care Document:

## 2025-06-19 NOTE — PROGRESS NOTE ADULT - PROBLEM SELECTOR PLAN 4
On diltiazem 240mg and metoprolol [id number] at home. Follows with cardiology but has no documented tachyarrhythmia history. Not septic during this admission.    - Continue home diltiazem 240 mg  - Continue home BB as IV lopressor 2.5mg q6h   - inpatient BP goals: SBP <140 (given hematuria)

## 2025-06-19 NOTE — PRE PROCEDURE NOTE - GENERAL PROCEDURE NAME
selective RT renal arteriogram and possible embolization. Nephrostomt tube pull back and replacement

## 2025-06-19 NOTE — CHART NOTE - NSCHARTNOTEFT_GEN_A_CORE
Patient s/p Aortogram, right renal angiogram, and right nephrostomy tube exchange     Pt went for above procedure today with Left femoral artery accessed. Pt denies SOB, CP, swelling, edema, or pain at site. Site checked. No ecchymosis, hematoma, or swelling within access point. Active ROM of toes without deficits. Motor and Sensory intact throughout extremity. 2+ peripheral pulses intact. Pt educated to look out for swelling, tingling, numbness of site and lower extremity and notify RN.     Patient's right nephrostomy tube evaluated. Dressing c/d/i. Drainage bag with fruit punch hematuria, ~300-400cc at this time. Patient c/o slight tenderness surrounding dressing. Pt s/p IVP PRN Dilaudid at 12:27 today. PO Tylenol to be given to patient.       Zohreh GARCIA Patient s/p Aortogram, right renal angiogram, and right nephrostomy tube exchange     Pt went for above procedure today with Left femoral artery accessed. Pt denies SOB, CP, swelling, edema, or pain at site. Site checked. No ecchymosis, hematoma, or swelling within access point. Active ROM of toes without deficits. Motor and Sensory intact throughout extremity. 2+ peripheral pulses intact. Pt educated to look out for swelling, tingling, numbness of site and lower extremity and notify RN.     Patient's right nephrostomy tube evaluated. Dressing c/d/i. Drainage bag with fruit punch hematuria, ~300-400cc at this time. Patient c/o slight tenderness surrounding dressing. Pt s/p IVP PRN Dilaudid at 12:27 today.        Zohreh GARCIA Patient s/p Aortogram, right renal angiogram, and right nephrostomy tube exchange     Pt went for above procedure today with Left femoral artery accessed. Pt denies SOB, CP, swelling, edema, or pain at site. Site checked. No ecchymosis, hematoma, or swelling within access point. Active ROM of toes without deficits. Motor and Sensory intact throughout extremity. 2+ peripheral pulses intact. Pt educated to look out for swelling, tingling, numbness of site and lower extremity and notify RN.     Patient's right nephrostomy tube evaluated. Dressing c/d/i. Drainage bag with fruit punch hematuria, ~300-400cc at this time. Patient c/o slight tenderness surrounding dressing. Pt s/p IVP PRN Dilaudid at 12:27 today.  PO Tylenol to be given prior to transport back to floor.       Zohreh GARCIA

## 2025-06-19 NOTE — PROGRESS NOTE ADULT - SUBJECTIVE AND OBJECTIVE BOX
Subjective  Denies fevers, chills, nausea, vomiting, SOB, CP. NPO for possible procedure.     Objective    Vital signs  T(F): , Max: 99.1 (06-18-25 @ 21:37)  HR: 107 (06-19-25 @ 08:15)  BP: 128/72 (06-19-25 @ 08:15)  SpO2: 99% (06-19-25 @ 08:15)  Wt(kg): --    Output     OUT:    Indwelling Catheter - Urethral (mL): 295 mL    Nephrostomy Tube (mL): 715 mL    Nephrostomy Tube (mL): 760 mL  Total OUT: 1770 mL    Total NET: -1770 mL          Gen: NAD  Abd: soft, nontender, nondistended  : davis secured in place, draining clear yellow urine; L PCNU draining clear yellow; R PCN with maroon color    Labs      06-19 @ 04:20    WBC 8.71  / Hct 24.0  / SCr 0.89     06-18 @ 18:14    WBC 10.06 / Hct 23.0  / SCr --           Culture - Blood (collected 06-12-25 @ 18:07)  Source: Blood Blood  Final Report (06-17-25 @ 22:00):    No growth at 5 days    Culture - Blood (collected 06-12-25 @ 17:52)  Source: Blood Blood  Final Report (06-17-25 @ 22:00):    No growth at 5 days        Urine Cx: ?  Blood Cx: ?    Imaging

## 2025-06-19 NOTE — PROGRESS NOTE ADULT - ATTENDING COMMENTS
59M w/ hx of prostate + anal cancer (s/p chemotherapy + radiation in 2020 c/b bilateral nephrostomy tubes and ostomy), HIV on Biktarvy, HTN, HLD, anxiety/depression presenting for bilateral flank pain and hematuria found to have positive UA and hematuria on PCUN.     #Hematuria: urology consulted, recommends pcun to pcn conversion be IR.  He was undergoing conversion of PCNU's to PCN's to prevent refluxing up the PCNU's and able to have successful conversion of the R side when patient became hypotensive, hemorrhage with clots,  s/p RRT 6/16 given hemorrhagic shock, s/p PRBC and IVF and MICU stay. 617: s/p OR with cystoscopy, clot evacuation, fulguration. Per Urology, After clot evacuation no obvious bleeding noted in the bladder and no suspicion of radiation cystitis. Patients hematuria likely coming from the upper tracts. Plan for IR renal angiogram, possible embolization, nephrostomy tube exchange 6/19. f/u IR post procedure    #Sepsis 2/2 pyelonephritis. ID cs, cont cefepime. fu urine culture >100K GNR. BCX Neg.    #hx of secondary syphilis, prior treatment with IM Bicillin (multiple treatment), fu with ID re RPR +, but confirmatory test neg, Outpt ID doc requesting treatment for Syphilis given +rare spirochetes in Derm Punch biopsy done outpt 6/3 per chart review. will f/u ID recs    #Acute Blood Loss Anemia: due t hematuria, s/p PRBC, s/w monitoring, hematuria still ongoing, Trend HB, PRBC if HB < 7    # HTN c/w BP meds and trend     # HIV: c/w ART    # Dvt ppx: scds .

## 2025-06-19 NOTE — PROGRESS NOTE ADULT - ASSESSMENT
58 yo M w/ PMHx of HIV on Biktarvy, HTN, prostate cancer and anal cancer s/p chemotherapy/radiation c/b b/l nephrostomy tubes (exchanged on 5/9 by IR) and ostomy presents for a few days of bilateral lower back/flank pain and bloody urine/passage of clots since 6 PM 6/9/25. Urology consulted for hematuria and bilateral flank pain. Patient is AVSS, WBC 8.3, Hct 36.6, Cr 0.8. UA positive. CTAP demonstrates bilateral nephroureteral tubes in appropriate position with no hydronephrosis bilaterally. Bladder is decompressed and prostate is unremarkable.     Hematuria can sometimes occur in setting of UTI and can be intermittent for as long as nephroureteral stents are in place. Cola colored urine will persist as old clot is lysed in the urine. Bilateral flank pain and burning with urination may be 2/2 cystitis/ ascending UTI and may improve w antibiotics. No intervention currently indicated as patient continues to void appropriately with decompressed bladder, normal H/H, normal Cr, and no hydronephrosis bilaterally    6/13: RBUS shows bladder with clot  6/14: Hct stable 27 from 30, Cr 1 from 1.1, comfortable, NU's draining maroon  6/15: comfortably, NU's draining maroon/brown. IR plans to convert PCNU's to PCNs under local on Monday 6/16    Recommendations:  - s/p OR with cystoscopy, clot evacuation, fulguration. After clot evacuation no obvious bleeding noted in the bladder and no suspisicion of radiation cystitis. Patients hematuria likely coming from the upper tracts.  - Leave catheter in place  - Today davis is draining clear, R PCN is maroon, and L PCNU is clear. Bleeding is coming from R kidney, patient is clinically stable at the moment but would likely benefit from R renal angio possible embo to evaluate source of R renal bleed  - Patient planned for possible angio/embo with IR today  - Trend H/H    D/w Dr. Kiran  Urology  [id number]

## 2025-06-19 NOTE — PROGRESS NOTE ADULT - SUBJECTIVE AND OBJECTIVE BOX
Internal Medicine Progress Note    Patient is a 59y old  Male who presents with a chief complaint of UTI w/ bilateral nephrostomy tubes (18 Jun 2025 11:06)    OVERNIGHT EVENTS/SUBJECTIVE:    OBJECTIVE:  Vital Signs Last 24 Hrs  T(C): 37 (19 Jun 2025 05:05), Max: 37.3 (18 Jun 2025 21:37)  T(F): 98.6 (19 Jun 2025 05:05), Max: 99.1 (18 Jun 2025 21:37)  HR: 101 (19 Jun 2025 05:05) (101 - 116)  BP: 111/74 (19 Jun 2025 05:05) (101/60 - 122/74)  BP(mean): --  RR: 17 (19 Jun 2025 05:05) (17 - 18)  SpO2: 99% (19 Jun 2025 05:05) (98% - 99%)    Parameters below as of 19 Jun 2025 05:05  Patient On (Oxygen Delivery Method): room air      I&O's Detail    18 Jun 2025 07:01  -  19 Jun 2025 07:00  --------------------------------------------------------  IN:    Oral Fluid: 120 mL  Total IN: 120 mL    OUT:    Indwelling Catheter - Urethral (mL): 295 mL    Nephrostomy Tube (mL): 715 mL    Nephrostomy Tube (mL): 760 mL  Total OUT: 1770 mL    Total NET: -1650 mL        Daily     Daily   Physical Exam:  General: NAD, resting comfortably in bed  Neuro: A&Ox4, 5/5 strength in all ext  HEENT: NC/AT, EOMI, normal hearing, oral mucosa moist, no oral lesions noted, no pharyngeal erythema, uvula midline  Neck: supple, thyroid not enlarged, no LAD  Resp: Breathing comfortably on RA, LCTA b/l  CV: Normal sinus rhythm, S1 and S2, no r/m/g  Abd: soft, non-distended, non-tender. No HSM.  Ext: ROMIx4, no edema, +2 pulses bilaterally  Skin: Warm and dry, no rashes or discolorations  Psych: Appropriate affect    Medications:  MEDICATIONS  (STANDING):  acetaminophen     Tablet .. 975 milliGRAM(s) Oral every 8 hours  bictegravir 50 mG/emtricitabine 200 mG/tenofovir alafenamide 25 mG (BIKTARVY) 1 Tablet(s) Oral daily  bisacodyl 5 milliGRAM(s) Oral every 12 hours  buPROPion XL (24-Hour) . 150 milliGRAM(s) Oral daily  cefepime   IVPB      cefepime   IVPB 2000 milliGRAM(s) IV Intermittent every 12 hours  chlorhexidine 2% Cloths 1 Application(s) Topical daily  cholecalciferol 2000 Unit(s) Oral daily  ezetimibe 10 milliGRAM(s) Oral at bedtime  multivitamin 1 Tablet(s) Oral daily  oxybutynin 5 milliGRAM(s) Oral every 8 hours  polyethylene glycol 3350 17 Gram(s) Oral two times a day  traZODone 50 milliGRAM(s) Oral at bedtime    MEDICATIONS  (PRN):  clonazePAM  Tablet 1 milliGRAM(s) Oral two times a day PRN for anxiety  HYDROmorphone  Injectable 1.25 milliGRAM(s) IV Push every 4 hours PRN Severe Pain (7 - 10)  oxyCODONE    IR 5 milliGRAM(s) Oral every 4 hours PRN Moderate Pain (4 - 6)  zolpidem 5 milliGRAM(s) Oral at bedtime PRN Insomnia      Labs:                        7.9    8.71  )-----------( 314      ( 19 Jun 2025 04:20 )             24.0     06-19    139  |  107  |  13  ----------------------------<  91  4.0   |  22  |  0.89    Ca    8.4      19 Jun 2025 04:20  Phos  2.9     06-19  Mg     1.90     06-19    TPro  5.4[L]  /  Alb  3.1[L]  /  TBili  <0.2  /  DBili  x   /  AST  13  /  ALT  16  /  AlkPhos  63  06-18    PT/INR - ( 19 Jun 2025 04:20 )   PT: 13.4 sec;   INR: 1.16 ratio         PTT - ( 19 Jun 2025 04:20 )  PTT:28.2 sec  Urinalysis Basic - ( 19 Jun 2025 04:20 )    Color: x / Appearance: x / SG: x / pH: x  Gluc: 91 mg/dL / Ketone: x  / Bili: x / Urobili: x   Blood: x / Protein: x / Nitrite: x   Leuk Esterase: x / RBC: x / WBC x   Sq Epi: x / Non Sq Epi: x / Bacteria: x          Radiology: Internal Medicine Progress Note    Patient is a 59y old  Male who presents with a chief complaint of UTI w/ bilateral nephrostomy tubes (18 Jun 2025 11:06)    OVERNIGHT EVENTS/SUBJECTIVE: No overnight events. Pt underwent R renal angiogram and nephrostomy tube exchange without issue with IR today. No bleeding.     OBJECTIVE:  Vital Signs Last 24 Hrs  T(C): 37 (19 Jun 2025 05:05), Max: 37.3 (18 Jun 2025 21:37)  T(F): 98.6 (19 Jun 2025 05:05), Max: 99.1 (18 Jun 2025 21:37)  HR: 101 (19 Jun 2025 05:05) (101 - 116)  BP: 111/74 (19 Jun 2025 05:05) (101/60 - 122/74)  BP(mean): --  RR: 17 (19 Jun 2025 05:05) (17 - 18)  SpO2: 99% (19 Jun 2025 05:05) (98% - 99%)    Parameters below as of 19 Jun 2025 05:05  Patient On (Oxygen Delivery Method): room air      I&O's Detail    18 Jun 2025 07:01  -  19 Jun 2025 07:00  --------------------------------------------------------  IN:    Oral Fluid: 120 mL  Total IN: 120 mL    OUT:    Indwelling Catheter - Urethral (mL): 295 mL    Nephrostomy Tube (mL): 715 mL    Nephrostomy Tube (mL): 760 mL  Total OUT: 1770 mL    Total NET: -1650 mL        Daily     Daily   Physical Exam:  General: NAD, resting comfortably in bed  Neuro: A&Ox4  HEENT: NC/AT, EOMI, normal hearing, oral mucosa moist, no oral lesions noted, no pharyngeal erythema, uvula midline  Neck: supple, thyroid not enlarged, no LAD  Resp: Breathing comfortably on RA, LCTA b/l  CV: Normal sinus rhythm, S1 and S2, no r/m/g  Abd: soft, non-distended, non-tender. No HSM.  Ext: no edema, +2 pulses bilaterally  Skin: Warm and dry, no rashes or discolorations  Psych: Appropriate affect    Medications:  MEDICATIONS  (STANDING):  acetaminophen     Tablet .. 975 milliGRAM(s) Oral every 8 hours  bictegravir 50 mG/emtricitabine 200 mG/tenofovir alafenamide 25 mG (BIKTARVY) 1 Tablet(s) Oral daily  bisacodyl 5 milliGRAM(s) Oral every 12 hours  buPROPion XL (24-Hour) . 150 milliGRAM(s) Oral daily  cefepime   IVPB      cefepime   IVPB 2000 milliGRAM(s) IV Intermittent every 12 hours  chlorhexidine 2% Cloths 1 Application(s) Topical daily  cholecalciferol 2000 Unit(s) Oral daily  ezetimibe 10 milliGRAM(s) Oral at bedtime  multivitamin 1 Tablet(s) Oral daily  oxybutynin 5 milliGRAM(s) Oral every 8 hours  polyethylene glycol 3350 17 Gram(s) Oral two times a day  traZODone 50 milliGRAM(s) Oral at bedtime    MEDICATIONS  (PRN):  clonazePAM  Tablet 1 milliGRAM(s) Oral two times a day PRN for anxiety  HYDROmorphone  Injectable 1.25 milliGRAM(s) IV Push every 4 hours PRN Severe Pain (7 - 10)  oxyCODONE    IR 5 milliGRAM(s) Oral every 4 hours PRN Moderate Pain (4 - 6)  zolpidem 5 milliGRAM(s) Oral at bedtime PRN Insomnia      Labs:                        7.9    8.71  )-----------( 314      ( 19 Jun 2025 04:20 )             24.0     06-19    139  |  107  |  13  ----------------------------<  91  4.0   |  22  |  0.89    Ca    8.4      19 Jun 2025 04:20  Phos  2.9     06-19  Mg     1.90     06-19    TPro  5.4[L]  /  Alb  3.1[L]  /  TBili  <0.2  /  DBili  x   /  AST  13  /  ALT  16  /  AlkPhos  63  06-18    PT/INR - ( 19 Jun 2025 04:20 )   PT: 13.4 sec;   INR: 1.16 ratio         PTT - ( 19 Jun 2025 04:20 )  PTT:28.2 sec  Urinalysis Basic - ( 19 Jun 2025 04:20 )    Color: x / Appearance: x / SG: x / pH: x  Gluc: 91 mg/dL / Ketone: x  / Bili: x / Urobili: x   Blood: x / Protein: x / Nitrite: x   Leuk Esterase: x / RBC: x / WBC x   Sq Epi: x / Non Sq Epi: x / Bacteria: x          Radiology:

## 2025-06-19 NOTE — PROGRESS NOTE ADULT - ATTENDING COMMENTS
Agree with above  Urine in davis and L NT clear  R NT with blood tinged urine  Went for IR angio today with no bleeding noted  Urine color improving  Conservative management

## 2025-06-19 NOTE — PROGRESS NOTE ADULT - PROBLEM SELECTOR PLAN 2
UA positive with bilateral nephrostomy site pain consistent with complex UTI without clinical or radiographic signs of pyelonephritis. Has a history of both sensitive and carbapenem-resistant pseudomonas. During hospital course, worsening tachycardia and pain with white count elevating.     - Zosyn (6/10PM -6/12), Cefepime 2g Q12 (6/12-  - UCx w/ >100k GNR; pending speciation, drawn from Nephrostomy tube  - ID following, appreciate recs  - standing tylenol for pain, oxycodone and IV dilaudid prn for moderate/severe pain  -palliative consulted for assistance with pain management given increasing IV dilaudid needs

## 2025-06-19 NOTE — PROGRESS NOTE ADULT - PROBLEM SELECTOR PLAN 5
Sinus tach to 130s on 6/12 ekg w/ sinus tach. Likely iso pain vs. bleed vs. worsening sepsis +/- reflex from holding Metoprolol.     - Encourage home Klonopin use per patient usual schedule to avoid mild withdrawal symptoms  - Pain management and infection control   - plan per hematuria.   - Restarted BB as IV lopressor 2.5mg q6h   - Cont home diltiazem 240mg daily

## 2025-06-19 NOTE — CHART NOTE - NSCHARTNOTEFT_GEN_A_CORE
Palliative Care consulted for pain management.     Chart Reviewed:   59M w/ hx of prostate + anal cancer (s/p chemotherapy + radiation in 2020 c/b bilateral nephrostomy tubes and ostomy), HIV on Biktarvy, HTN, HLD, anxiety/depression presenting for bilateral flank pain and hematuria found to have positive UA and hematuria on PCUN.     Outpatient notes reviewed. Patient had been following with Dr. Colon and last seen in office 1/2024. Per Dr. Colon patient no longer being followed by outpatient palliative team as his cancer is in remission.   Spoke with primary team Dr. Pardo, who confirmed patient does not have active cancer at this time.     As patient with no active cancer at this time, please consult acute or chronic pain for pain management.   Can consider outreach to offices of Dr. Audelia Fuller or Dr Chatman to determine if appropriate for pain management at their practice in the event patient needs outpatient followup.     Thank you for allowing us to participate in your patient's care.  Patient to be discharged from [facility] team consult service today.   Discussed with primary team.  Please re-consult if we can be of further assistance, page [phone].

## 2025-06-19 NOTE — PROGRESS NOTE ADULT - PROBLEM SELECTOR PLAN 3
has history of secondary syphillis which was dx ~5 years ago and treated, also treated ~1 year ago. Syphilis AB+ but confirmatory negative  - syphilis AB+ but negative confirmatory. Had outpt skin biopsy with derm -> showed rare spirochete   - s/p doxy 6/16 - 6/18, (outpatient ID provider is requesting for IV PCN course at time of discharge)  -ID following appreciate recs

## 2025-06-19 NOTE — PROGRESS NOTE ADULT - PROBLEM SELECTOR PLAN 1
Dark colored in bilateral nephrostomy tubes. Increased hematuria on 6/11 in R bag with clots. Confirmed clot in bladder on 6/13 KUB US.  IR converted nephroureterostomy tubes to nephrostomy tubes on 6/16 to reduce bladder irritation and allow bladder bleeding to tamponade off however c/b RRT for hypotension -> went to MICU did not require pressors   Underwent Cysto, clot evacuation, fulguration 6/18, hematuria likely coming from R kidney  - cbc q12h  - Maintain active T&S  - transfuse PRN hgb <7 or for active bleeding  -  IR will plan to perform renal angiogram, possible embolization, nephrostomy tube exchange 6/19.

## 2025-06-19 NOTE — CONSULT NOTE ADULT - CONSULT REASON
hematuria, bl flank pain with bl nephroureteral tubes in place
uti
pain management
Exchange of B/L PCNU for PCN i/s/o diversion 2/2 hematuria

## 2025-06-20 LAB
ANION GAP SERPL CALC-SCNC: 10 MMOL/L — SIGNIFICANT CHANGE UP (ref 7–14)
BUN SERPL-MCNC: 11 MG/DL — SIGNIFICANT CHANGE UP (ref 7–23)
CALCIUM SERPL-MCNC: 9 MG/DL — SIGNIFICANT CHANGE UP (ref 8.4–10.5)
CHLORIDE SERPL-SCNC: 102 MMOL/L — SIGNIFICANT CHANGE UP (ref 98–107)
CO2 SERPL-SCNC: 25 MMOL/L — SIGNIFICANT CHANGE UP (ref 22–31)
CREAT SERPL-MCNC: 0.83 MG/DL — SIGNIFICANT CHANGE UP (ref 0.5–1.3)
EGFR: 101 ML/MIN/1.73M2 — SIGNIFICANT CHANGE UP
EGFR: 101 ML/MIN/1.73M2 — SIGNIFICANT CHANGE UP
GLUCOSE SERPL-MCNC: 96 MG/DL — SIGNIFICANT CHANGE UP (ref 70–99)
HCT VFR BLD CALC: 25.2 % — LOW (ref 39–50)
HGB BLD-MCNC: 8.6 G/DL — LOW (ref 13–17)
MAGNESIUM SERPL-MCNC: 2 MG/DL — SIGNIFICANT CHANGE UP (ref 1.6–2.6)
MCHC RBC-ENTMCNC: 30.5 PG — SIGNIFICANT CHANGE UP (ref 27–34)
MCHC RBC-ENTMCNC: 34.1 G/DL — SIGNIFICANT CHANGE UP (ref 32–36)
MCV RBC AUTO: 89.4 FL — SIGNIFICANT CHANGE UP (ref 80–100)
NRBC # BLD AUTO: 0 K/UL — SIGNIFICANT CHANGE UP (ref 0–0)
NRBC # FLD: 0 K/UL — SIGNIFICANT CHANGE UP (ref 0–0)
NRBC BLD AUTO-RTO: 0 /100 WBCS — SIGNIFICANT CHANGE UP (ref 0–0)
PHOSPHATE SERPL-MCNC: 2.3 MG/DL — LOW (ref 2.5–4.5)
PLATELET # BLD AUTO: 348 K/UL — SIGNIFICANT CHANGE UP (ref 150–400)
PMV BLD: 8.9 FL — SIGNIFICANT CHANGE UP (ref 7–13)
POTASSIUM SERPL-MCNC: 4.2 MMOL/L — SIGNIFICANT CHANGE UP (ref 3.5–5.3)
POTASSIUM SERPL-SCNC: 4.2 MMOL/L — SIGNIFICANT CHANGE UP (ref 3.5–5.3)
RBC # BLD: 2.82 M/UL — LOW (ref 4.2–5.8)
RBC # FLD: 17 % — HIGH (ref 10.3–14.5)
SODIUM SERPL-SCNC: 137 MMOL/L — SIGNIFICANT CHANGE UP (ref 135–145)
WBC # BLD: 7.62 K/UL — SIGNIFICANT CHANGE UP (ref 3.8–10.5)
WBC # FLD AUTO: 7.62 K/UL — SIGNIFICANT CHANGE UP (ref 3.8–10.5)

## 2025-06-20 PROCEDURE — G0545: CPT

## 2025-06-20 PROCEDURE — 99232 SBSQ HOSP IP/OBS MODERATE 35: CPT | Mod: GC

## 2025-06-20 PROCEDURE — 99232 SBSQ HOSP IP/OBS MODERATE 35: CPT

## 2025-06-20 RX ORDER — OXYCODONE HYDROCHLORIDE 30 MG/1
5 TABLET ORAL EVERY 4 HOURS
Refills: 0 | Status: DISCONTINUED | OUTPATIENT
Start: 2025-06-20 | End: 2025-06-22

## 2025-06-20 RX ORDER — OXYCODONE HYDROCHLORIDE 30 MG/1
10 TABLET ORAL EVERY 4 HOURS
Refills: 0 | Status: DISCONTINUED | OUTPATIENT
Start: 2025-06-20 | End: 2025-06-22

## 2025-06-20 RX ORDER — POTASSIUM PHOSPHATE, MONOBASIC POTASSIUM PHOSPHATE, DIBASIC INJECTION, 236; 224 MG/ML; MG/ML
15 SOLUTION, CONCENTRATE INTRAVENOUS ONCE
Refills: 0 | Status: COMPLETED | OUTPATIENT
Start: 2025-06-20 | End: 2025-06-20

## 2025-06-20 RX ORDER — HYDROMORPHONE/SOD CHLOR,ISO/PF 2 MG/10 ML
0.5 SYRINGE (ML) INJECTION ONCE
Refills: 0 | Status: DISCONTINUED | OUTPATIENT
Start: 2025-06-20 | End: 2025-06-20

## 2025-06-20 RX ADMIN — Medication 975 MILLIGRAM(S): at 22:48

## 2025-06-20 RX ADMIN — POTASSIUM PHOSPHATE, MONOBASIC POTASSIUM PHOSPHATE, DIBASIC INJECTION, 62.5 MILLIMOLE(S): 236; 224 SOLUTION, CONCENTRATE INTRAVENOUS at 14:47

## 2025-06-20 RX ADMIN — EZETIMIBE 10 MILLIGRAM(S): 10 TABLET ORAL at 22:47

## 2025-06-20 RX ADMIN — CEFEPIME 100 MILLIGRAM(S): 2 INJECTION, POWDER, FOR SOLUTION INTRAVENOUS at 05:35

## 2025-06-20 RX ADMIN — OXYBUTYNIN CHLORIDE 5 MILLIGRAM(S): 5 TABLET, FILM COATED, EXTENDED RELEASE ORAL at 05:36

## 2025-06-20 RX ADMIN — OXYBUTYNIN CHLORIDE 5 MILLIGRAM(S): 5 TABLET, FILM COATED, EXTENDED RELEASE ORAL at 22:48

## 2025-06-20 RX ADMIN — OXYCODONE HYDROCHLORIDE 10 MILLIGRAM(S): 30 TABLET ORAL at 10:54

## 2025-06-20 RX ADMIN — Medication 1 TABLET(S): at 11:39

## 2025-06-20 RX ADMIN — Medication 5 MILLIGRAM(S): at 05:36

## 2025-06-20 RX ADMIN — POLYETHYLENE GLYCOL 3350 17 GRAM(S): 17 POWDER, FOR SOLUTION ORAL at 17:43

## 2025-06-20 RX ADMIN — Medication 1 APPLICATION(S): at 11:38

## 2025-06-20 RX ADMIN — Medication 1.25 MILLIGRAM(S): at 05:45

## 2025-06-20 RX ADMIN — Medication 0.5 MILLIGRAM(S): at 15:30

## 2025-06-20 RX ADMIN — BICTEGRAVIR SODIUM, EMTRICITABINE, AND TENOFOVIR ALAFENAMIDE FUMARATE 1 TABLET(S): 50; 200; 25 TABLET ORAL at 11:39

## 2025-06-20 RX ADMIN — BUPROPION HYDROBROMIDE 150 MILLIGRAM(S): 522 TABLET, EXTENDED RELEASE ORAL at 11:39

## 2025-06-20 RX ADMIN — POLYETHYLENE GLYCOL 3350 17 GRAM(S): 17 POWDER, FOR SOLUTION ORAL at 05:37

## 2025-06-20 RX ADMIN — Medication 2000 UNIT(S): at 11:39

## 2025-06-20 RX ADMIN — OXYCODONE HYDROCHLORIDE 10 MILLIGRAM(S): 30 TABLET ORAL at 11:54

## 2025-06-20 RX ADMIN — Medication 5 MILLIGRAM(S): at 17:43

## 2025-06-20 RX ADMIN — Medication 50 MILLIGRAM(S): at 22:48

## 2025-06-20 RX ADMIN — OXYBUTYNIN CHLORIDE 5 MILLIGRAM(S): 5 TABLET, FILM COATED, EXTENDED RELEASE ORAL at 15:33

## 2025-06-20 RX ADMIN — Medication 0.5 MILLIGRAM(S): at 15:45

## 2025-06-20 RX ADMIN — Medication 1.25 MILLIGRAM(S): at 05:30

## 2025-06-20 RX ADMIN — CEFEPIME 100 MILLIGRAM(S): 2 INJECTION, POWDER, FOR SOLUTION INTRAVENOUS at 17:43

## 2025-06-20 NOTE — PROGRESS NOTE ADULT - SUBJECTIVE AND OBJECTIVE BOX
Subjective  Denies fevers, chills, nausea, vomiting, SOB, CP. Tolerating diet.    Objective    Vital signs  T(F): , Max: 98.9 (06-20-25 @ 05:21)  HR: 107 (06-20-25 @ 05:21)  BP: 121/85 (06-20-25 @ 05:21)  SpO2: 99% (06-20-25 @ 05:21)  Wt(kg): --    Output     OUT:    Indwelling Catheter - Urethral (mL): 45 mL    Nephrostomy Tube (mL): 975 mL    Nephrostomy Tube (mL): 1275 mL  Total OUT: 2295 mL    Total NET: -2295 mL          Gen: NAD  Abd: soft, nontender, nondistended  : davis secured in place, draining CYU. L PCNU draining clear yellow. R PCN with very light pink drainage.     Labs      06-19 @ 04:20    WBC 8.71  / Hct 24.0  / SCr 0.89     06-18 @ 18:14    WBC 10.06 / Hct 23.0  / SCr --             Urine Cx: ?  Blood Cx: ?    Imaging

## 2025-06-20 NOTE — PROGRESS NOTE ADULT - SUBJECTIVE AND OBJECTIVE BOX
Amos Pollock MD  PGY 2 Department of Internal Medicine        Patient is a 59y old  Male who presents with a chief complaint of UTI w/ bilateral nephrostomy tubes (19 Jun 2025 09:13)      SUBJECTIVE / OVERNIGHT EVENTS: Pt seen and examined. No acute overnight events. Denies fevers, chills, CP, SOB, Abdominal pain, N/V, Constipation, Diarrhea        MEDICATIONS  (STANDING):  acetaminophen     Tablet .. 975 milliGRAM(s) Oral every 8 hours  bictegravir 50 mG/emtricitabine 200 mG/tenofovir alafenamide 25 mG (BIKTARVY) 1 Tablet(s) Oral daily  bisacodyl 5 milliGRAM(s) Oral every 12 hours  buPROPion XL (24-Hour) . 150 milliGRAM(s) Oral daily  cefepime   IVPB      cefepime   IVPB 2000 milliGRAM(s) IV Intermittent every 12 hours  chlorhexidine 2% Cloths 1 Application(s) Topical daily  cholecalciferol 2000 Unit(s) Oral daily  ezetimibe 10 milliGRAM(s) Oral at bedtime  multivitamin 1 Tablet(s) Oral daily  oxybutynin 5 milliGRAM(s) Oral every 8 hours  polyethylene glycol 3350 17 Gram(s) Oral two times a day  traZODone 50 milliGRAM(s) Oral at bedtime    MEDICATIONS  (PRN):  HYDROmorphone  Injectable 1.25 milliGRAM(s) IV Push every 4 hours PRN Severe Pain (7 - 10)      I&O's Summary    19 Jun 2025 07:01  -  20 Jun 2025 07:00  --------------------------------------------------------  IN: 220 mL / OUT: 2295 mL / NET: -2075 mL        Vital Signs Last 24 Hrs  T(C): 37.2 (20 Jun 2025 05:21), Max: 37.2 (20 Jun 2025 05:21)  T(F): 98.9 (20 Jun 2025 05:21), Max: 98.9 (20 Jun 2025 05:21)  HR: 107 (20 Jun 2025 05:21) (89 - 111)  BP: 121/85 (20 Jun 2025 05:21) (103/73 - 138/85)  BP(mean): --  RR: 17 (20 Jun 2025 05:21) (16 - 20)  SpO2: 99% (20 Jun 2025 05:21) (95% - 100%)    Parameters below as of 20 Jun 2025 05:21  Patient On (Oxygen Delivery Method): room air        CAPILLARY BLOOD GLUCOSE          PHYSICAL EXAM:  GENERAL: NAD, lying in bed comfortably  HEAD:  Atraumatic, Normocephalic  EYES: EOMI, conjunctiva and sclera clear  ENT: Moist mucous membranes  CHEST/LUNG: Clear to auscultation bilaterally; No rales, rhonchi, wheezing, or rubs. Unlabored respirations  HEART: tachycardia and regular rhythm; No murmurs, rubs, or gallops  ABDOMEN: ostomy present near umbilicus CDI, diffuse lower abd tenderness nondistended+soft. bilateral nephrostomy tubes CDI and nontender, left bag with yellow urine + brown sediment. right bag with port wine colored output  EXTREMITIES:  No clubbing, cyanosis, or edema  NERVOUS SYSTEM:  A&Ox3, no focal deficits   SKIN: No rashes or lesions       LABS:                        7.9    8.71  )-----------( 314      ( 19 Jun 2025 04:20 )             24.0     Auto Eosinophil # 0.12  / Auto Eosinophil % 1.4   / Auto Neutrophil # 5.40  / Auto Neutrophil % 62.0  / BANDS % x                            7.8    10.06 )-----------( 287      ( 18 Jun 2025 18:14 )             23.0     Auto Eosinophil # x     / Auto Eosinophil % x     / Auto Neutrophil # x     / Auto Neutrophil % x     / BANDS % x        06-19    139  |  107  |  13  ----------------------------<  91  4.0   |  22  |  0.89    Ca    8.4      19 Jun 2025 04:20  Mg     1.90     06-19  Phos  2.9     06-19    PT/INR - ( 19 Jun 2025 04:20 )   PT: 13.4 sec;   INR: 1.16 ratio         PTT - ( 19 Jun 2025 04:20 )  PTT:28.2 sec      Urinalysis Basic - ( 19 Jun 2025 04:20 )    Color: x / Appearance: x / SG: x / pH: x  Gluc: 91 mg/dL / Ketone: x  / Bili: x / Urobili: x   Blood: x / Protein: x / Nitrite: x   Leuk Esterase: x / RBC: x / WBC x   Sq Epi: x / Non Sq Epi: x / Bacteria: x            RADIOLOGY & ADDITIONAL TESTS:    Imaging Personally Reviewed:    Consultant(s) Notes Reviewed:      Care Discussed with Consultants/Other Providers:   Amos Pollock MD  PGY 2 Department of Internal Medicine        Patient is a 59y old  Male who presents with a chief complaint of UTI w/ bilateral nephrostomy tubes (19 Jun 2025 09:13)      SUBJECTIVE / OVERNIGHT EVENTS: Pt seen and examined. No acute overnight events. Continues to have lower abd pain (R>L) overall improved from prior. No other complaints at this time.       MEDICATIONS  (STANDING):  acetaminophen     Tablet .. 975 milliGRAM(s) Oral every 8 hours  bictegravir 50 mG/emtricitabine 200 mG/tenofovir alafenamide 25 mG (BIKTARVY) 1 Tablet(s) Oral daily  bisacodyl 5 milliGRAM(s) Oral every 12 hours  buPROPion XL (24-Hour) . 150 milliGRAM(s) Oral daily  cefepime   IVPB      cefepime   IVPB 2000 milliGRAM(s) IV Intermittent every 12 hours  chlorhexidine 2% Cloths 1 Application(s) Topical daily  cholecalciferol 2000 Unit(s) Oral daily  ezetimibe 10 milliGRAM(s) Oral at bedtime  multivitamin 1 Tablet(s) Oral daily  oxybutynin 5 milliGRAM(s) Oral every 8 hours  polyethylene glycol 3350 17 Gram(s) Oral two times a day  traZODone 50 milliGRAM(s) Oral at bedtime    MEDICATIONS  (PRN):  HYDROmorphone  Injectable 1.25 milliGRAM(s) IV Push every 4 hours PRN Severe Pain (7 - 10)      I&O's Summary    19 Jun 2025 07:01  -  20 Jun 2025 07:00  --------------------------------------------------------  IN: 220 mL / OUT: 2295 mL / NET: -2075 mL        Vital Signs Last 24 Hrs  T(C): 37.2 (20 Jun 2025 05:21), Max: 37.2 (20 Jun 2025 05:21)  T(F): 98.9 (20 Jun 2025 05:21), Max: 98.9 (20 Jun 2025 05:21)  HR: 107 (20 Jun 2025 05:21) (89 - 111)  BP: 121/85 (20 Jun 2025 05:21) (103/73 - 138/85)  BP(mean): --  RR: 17 (20 Jun 2025 05:21) (16 - 20)  SpO2: 99% (20 Jun 2025 05:21) (95% - 100%)    Parameters below as of 20 Jun 2025 05:21  Patient On (Oxygen Delivery Method): room air        CAPILLARY BLOOD GLUCOSE          PHYSICAL EXAM:  GENERAL: NAD, lying in bed comfortably  HEAD:  Atraumatic, Normocephalic  EYES: EOMI, conjunctiva and sclera clear  ENT: Moist mucous membranes  CHEST/LUNG: Clear to auscultation bilaterally; No rales, rhonchi, wheezing, or rubs. Unlabored respirations  HEART: tachycardia and regular rhythm; No murmurs, rubs, or gallops  ABDOMEN: ostomy present near umbilicus CDI, diffuse lower abd tenderness nondistended+soft. bilateral nephrostomy tubes CDI and nontender, left bag with yellow urine. right bag with brownish output  EXTREMITIES:  No clubbing, cyanosis, or edema  NERVOUS SYSTEM:  A&Ox3, no focal deficits   SKIN: No rashes or lesions       LABS:                        7.9    8.71  )-----------( 314      ( 19 Jun 2025 04:20 )             24.0     Auto Eosinophil # 0.12  / Auto Eosinophil % 1.4   / Auto Neutrophil # 5.40  / Auto Neutrophil % 62.0  / BANDS % x                            7.8    10.06 )-----------( 287      ( 18 Jun 2025 18:14 )             23.0     Auto Eosinophil # x     / Auto Eosinophil % x     / Auto Neutrophil # x     / Auto Neutrophil % x     / BANDS % x        06-19    139  |  107  |  13  ----------------------------<  91  4.0   |  22  |  0.89    Ca    8.4      19 Jun 2025 04:20  Mg     1.90     06-19  Phos  2.9     06-19    PT/INR - ( 19 Jun 2025 04:20 )   PT: 13.4 sec;   INR: 1.16 ratio         PTT - ( 19 Jun 2025 04:20 )  PTT:28.2 sec      Urinalysis Basic - ( 19 Jun 2025 04:20 )    Color: x / Appearance: x / SG: x / pH: x  Gluc: 91 mg/dL / Ketone: x  / Bili: x / Urobili: x   Blood: x / Protein: x / Nitrite: x   Leuk Esterase: x / RBC: x / WBC x   Sq Epi: x / Non Sq Epi: x / Bacteria: x            RADIOLOGY & ADDITIONAL TESTS:    Imaging Personally Reviewed:    Consultant(s) Notes Reviewed:      Care Discussed with Consultants/Other Providers:

## 2025-06-20 NOTE — PROGRESS NOTE ADULT - ATTENDING COMMENTS
59M w/ hx of prostate + anal cancer (s/p chemotherapy + radiation in 2020 c/b bilateral nephrostomy tubes and ostomy), HIV on Biktarvy, HTN, HLD, anxiety/depression presenting for bilateral flank pain and hematuria found to have positive UA and hematuria on PCUN.     #Hematuria: urology consulted, recommends pcun to pcn conversion be IR.  He was undergoing conversion of PCNU's to PCN's to prevent refluxing up the PCNU's and able to have successful conversion of the R side when patient became hypotensive, hemorrhage with clots,  s/p RRT 6/16 given hemorrhagic shock, s/p PRBC and IVF and MICU stay. 617: s/p OR with cystoscopy, clot evacuation, fulguration. Per Urology, After clot evacuation no obvious bleeding noted in the bladder and no suspicion of radiation cystitis. Patients hematuria likely coming from the upper tracts. s/p IR Aortogram, Right renal angiogram, Right nephrostomy tube exchange 6/19. Aortography demonstrates patent iliac vessels with no pseudoaneurysm or active extravasation. Right renal angiogram with and without nephrostomy demonstrates no pseudoaneurysm or active extravasation.    Will f/u IR when to dc    #Sepsis 2/2 pyelonephritis. ID cs, cont cefepime, f/u ID. fu urine culture >100K GNR. BCX Neg.    #hx of secondary syphilis, prior treatment with IM Bicillin (multiple treatment), fu with ID re RPR +, but confirmatory test neg, Outpt ID doc requesting treatment for Syphilis given +rare spirochetes in Derm Punch biopsy done outpt 6/3 per chart review. Plan for penicillin treatment per ID    #Acute Blood Loss Anemia: due t hematuria, s/p PRBC, s/w monitoring, hematuria improving, Trend HB, PRBC if HB < 7    # HTN c/w BP meds and trend     # HIV: c/w ART    # Dvt ppx: scds .

## 2025-06-20 NOTE — PROGRESS NOTE ADULT - ASSESSMENT
58 y/o M w/ PMhx of HTN, HLD, anxiety/depression, HIV on Biktarvy (CD4 624 in 12/2024 and VL UD 5/25),  h/o anal and prostate Ca (s/p chemotherapy + radiation in 2020 c/b bilateral nephrostomy tubes and ostomy),  ureteral stricture s/p stenting and bow with b/l nephroureteral tubes (last exchanged 5/9), recent parastomal hernia repair with mesh in 4/2025 admitted to [facility] on 6/10 for b/l flank pain, hematuria  Pt afebrile, initially w/o leukocytosis, now 13, UCx w/ GNR  CT A/P with b/l PCT with distal tips coiled in the bladder  D/w outpatient ID doctor Dr. 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 Denise Gordon ([phone])  - C/w Biktarvy, HIV well controlled   - F/U IR/Urology  - F/U BCXs    Signing off. Please call [phone] or on call fellow with further questions or change in status.     Nick Albarran MD  Contact on TEAMS messaging from 9am - 5pm  From 5pm-9am, on weekends, or if no response call [phone]    Antibiotic decision making based on local antibiogram and available recent culture results

## 2025-06-20 NOTE — PROGRESS NOTE ADULT - ASSESSMENT
58 yo M w/ PMHx of HIV on Biktarvy, HTN, prostate cancer and anal cancer s/p chemotherapy/radiation c/b b/l nephrostomy tubes (exchanged on 5/9 by IR) and ostomy presents for a few days of bilateral lower back/flank pain and bloody urine/passage of clots since 6 PM 6/9/25. Urology consulted for hematuria and bilateral flank pain. Patient is AVSS, WBC 8.3, Hct 36.6, Cr 0.8. UA positive. CTAP demonstrates bilateral nephroureteral tubes in appropriate position with no hydronephrosis bilaterally. Bladder is decompressed and prostate is unremarkable.     Hematuria can sometimes occur in setting of UTI and can be intermittent for as long as nephroureteral stents are in place. Cola colored urine will persist as old clot is lysed in the urine. Bilateral flank pain and burning with urination may be 2/2 cystitis/ ascending UTI and may improve w antibiotics. No intervention currently indicated as patient continues to void appropriately with decompressed bladder, normal H/H, normal Cr, and no hydronephrosis bilaterally    6/13: RBUS shows bladder with clot  6/14: Hct stable 27 from 30, Cr 1 from 1.1, comfortable, NU's draining maroon  6/15: comfortably, NU's draining maroon/brown. IR plans to convert PCNU's to PCNs under local on Monday 6/16    Recommendations:  - s/p OR with cystoscopy, clot evacuation, fulguration. After clot evacuation no obvious bleeding noted in the bladder and no suspicion of radiation cystitis. Patients hematuria likely coming from the upper tracts.  - s/p IR angio with no evidence of extravasation from R kidney  - Patients davis catheter is clear yellow this morning, can be removed  - Unclear etiology of hematuria but not 2/2 radiation cystitis from the bladder and not a psuedoanuerysym. May be 2/2 to infection and irritation from tubes but at this time no evidence of significant hematuria with H/H stable. Can remove davis catheter and patient can follow with urology/IR outpatient for continued management of PCNU/PCN  - Trend H/H    No further recommendations from urologic perspective, urology to sign off please reconsult as needed. For reconsults page [phone].     D/w Dr. Kiran  Urology  [id number]    [facility]  [address] [place] [address], [address]  [place], [place] [id number] [phone]

## 2025-06-20 NOTE — PROGRESS NOTE ADULT - ASSESSMENT
Patient is a 59 year old Male w/ PMHx of prostate + anal cancer (s/p chemotherapy + radiation in 2020 c/b bilateral nephrostomy tubes and ostomy), HIV on Biktarvy, HTN, HLD, anxiety/depression presenting for bilateral flank pain and hematuria found to have positive UA and normal functioning nephrostomy tubes per urology suggestive of UTI. Course complicated by port wine colored hematuria into bilateral L>R nephrourterostomy bags, further c/b RRT for hypotension, tachycardia at IR procedure for conversion of PCNUs to PCNs with c/f pseudoaneurysm so transferred to MICU, underwent cystoscopy, dilation, clot evacuation in the OR 6/17 with urology. Patient s/p Aortogram, Right renal angiogram, Right nephrostomy tube exchange on 6/19 with Interventional Radiology.     Plan:  - Continue drainage, monitor output  - Forward flush nephrostomy tube 5cc NS once a day.  - Patient will need routine nephrostomy tube exchange every 3 months. Can call Outpatient IR office [phone] or email at [email or web address] to schedule.     [id number]

## 2025-06-20 NOTE — PROGRESS NOTE ADULT - SUBJECTIVE AND OBJECTIVE BOX
CC: F/U for UTI    Saw/spoke to patient. No fevers, no chills. No new complaints.    Allergies  No Known Allergies    ANTIMICROBIALS:  bictegravir 50 mG/emtricitabine 200 mG/tenofovir alafenamide 25 mG (BIKTARVY) 1 daily  cefepime   IVPB 2000 every 12 hours  cefepime   IVPB      PE:    Vital Signs Last 24 Hrs  T(C): 37.2 (2025 12:37), Max: 37.2 (2025 05:21)  T(F): 99 (2025 12:37), Max: 99 (2025 12:37)  HR: 104 (2025 12:37) (99 - 111)  BP: 109/75 (2025 12:37) (103/73 - 138/85)  RR: 19 (2025 12:37) (17 - 19)  SpO2: 99% (2025 12:37) (95% - 100%)    Gen: AOx3, NAD, non-toxic  CV: Nontachycardic  Resp: Breathing comfortably, RA  Abd: Soft, nontender  IV/Skin: No thrombophlebitis    LABS:                        8.6    7.62  )-----------( 348      ( 2025 11:31 )             25.2     06-20    137  |  102  |  11  ----------------------------<  96  4.2   |  25  |  0.83    Ca    9.0      2025 11:31  Phos  2.3     06-20  Mg     2.00     06-20    Urinalysis Basic - ( 2025 11:31 )    Color: x / Appearance: x / SG: x / pH: x  Gluc: 96 mg/dL / Ketone: x  / Bili: x / Urobili: x   Blood: x / Protein: x / Nitrite: x   Leuk Esterase: x / RBC: x / WBC x   Sq Epi: x / Non Sq Epi: x / Bacteria: x    MICROBIOLOGY:    Blood Blood  25   No growth at 5 days  --  --    Blood Blood  25   No growth at 5 days  --  --    Urine  06-10-25   >100,000 CFU/ml Enterobacter cloacae complex  50,000 - 99,000 CFU/mL Pseudomonas aeruginosa  --  Enterobacter cloacae complex  Pseudomonas aeruginosa    HIV-1 RNA Quantitative, Viral Load Log: NOT DET. lg /mL (05-10-25 @ 02:15)  HIV-1 RNA Quantitative, Viral Load Log: NOT DET. lg /mL (24 @ 19:52)  HIV-1 RNA Quantitative, Viral Load Lo.5 (24 @ 11:25)    RADIOLOGY:        IMPRESSION:  1.  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  2.  Right distal ureter appears to be obstructed with blood clot.   Additional hematoma in the right proximal urinary collecting system and   urinary bladder. No evidence of active extravasation.

## 2025-06-20 NOTE — PROGRESS NOTE ADULT - PROBLEM SELECTOR PLAN 1
Dark colored in bilateral nephrostomy tubes. Increased hematuria on 6/11 in R bag with clots. Confirmed clot in bladder on 6/13 KUB US.  IR converted nephroureterostomy tubes to nephrostomy tubes on 6/16 to reduce bladder irritation and allow bladder bleeding to tamponade off however c/b RRT for hypotension -> went to MICU did not require pressors   Underwent Cysto, clot evacuation, fulguration 6/18, hematuria likely coming from R kidney  s/p IR 6/19 for Right renal angiogram with and without nephrostomy in situ demonstrates no pseudoaneurysm or active extravasation, Right 12Fr nephrostomy exchange.  - trend cbc  - Maintain active T&S  - transfuse PRN hgb <7 or for active bleeding Dark colored in bilateral nephrostomy tubes. Increased hematuria on 6/11 in R bag with clots. Confirmed clot in bladder on 6/13 KUB US.  IR converted nephroureterostomy tubes to nephrostomy tubes on 6/16 to reduce bladder irritation and allow bladder bleeding to tamponade off however c/b RRT for hypotension -> went to MICU did not require pressors   Underwent Cysto, clot evacuation, fulguration 6/18, hematuria likely coming from R kidney  s/p IR 6/19 for Right renal angiogram with and without nephrostomy in situ demonstrates no pseudoaneurysm or active extravasation, Right 12Fr nephrostomy exchange.  - trend cbc  - Maintain active T&S  - transfuse PRN hgb <7 or for active bleeding  - per urology, unclear etiology of hematuria but not 2/2 radiation cystitis from the bladder and not a psuedoanuerysym, May be 2/2 to infection and irritation from tubes. Can remove davis catheter and patient can follow with urology/IR outpatient for continued management of PCNU/PCN. Urology signed off  -TOV, bladder scans q8h Dark colored in bilateral nephrostomy tubes. Increased hematuria on 6/11 in R bag with clots. Confirmed clot in bladder on 6/13 KUB US.  IR converted nephroureterostomy tubes to nephrostomy tubes on 6/16 to reduce bladder irritation and allow bladder bleeding to tamponade off however c/b RRT for hypotension -> went to MICU did not require pressors   Underwent Cysto, clot evacuation, fulguration 6/18, hematuria likely coming from R kidney  s/p IR 6/19 for Right renal angiogram with and without nephrostomy in situ demonstrates no pseudoaneurysm or active extravasation, Right 12Fr nephrostomy exchange.  - trend cbc  - Maintain active T&S  - transfuse PRN hgb <7 or for active bleeding  - per urology, unclear etiology of hematuria but not 2/2 radiation cystitis from the bladder and not a psuedoanuerysym, May be 2/2 to infection and irritation from tubes. Can remove davis catheter and patient can follow with urology/IR outpatient for continued management of PCNU/PCN. Urology signed off

## 2025-06-20 NOTE — PROGRESS NOTE ADULT - PROBLEM SELECTOR PLAN 4
On diltiazem 240mg and metoprolol [id number] at home. Follows with cardiology but has no documented tachyarrhythmia history. Not septic during this admission.    - Continue home diltiazem 240 mg  - holding home bblocker iso soft BPs  - inpatient BP goals: SBP <140 (given hematuria)

## 2025-06-20 NOTE — PROGRESS NOTE ADULT - PROBLEM SELECTOR PLAN 2
UA positive with bilateral nephrostomy site pain consistent with complex UTI without clinical or radiographic signs of pyelonephritis. Has a history of both sensitive and carbapenem-resistant pseudomonas. During hospital course, worsening tachycardia and pain with white count elevating.     - Zosyn (6/10PM -6/12), Cefepime 2g Q12 (6/12-  - UCx w/ >100k GNR; pending speciation, drawn from Nephrostomy tube  - ID following, appreciate recs  - standing tylenol for pain, oxycodone and IV dilaudid prn for moderate/severe pain Strong peripheral pulses UA positive with bilateral nephrostomy site pain consistent with complex UTI without clinical or radiographic signs of pyelonephritis. Has a history of both sensitive and carbapenem-resistant pseudomonas. During hospital course, worsening tachycardia and pain with white count elevating.     - Zosyn (6/10PM -6/12), Cefepime 2g Q12 (6/12-21) for 10 day course  - UCx w/ >100k GNR; pending speciation, drawn from Nephrostomy tube  - ID following, appreciate recs  - standing tylenol for pain, oxycodone prn for moderate/severe pain

## 2025-06-20 NOTE — PROGRESS NOTE ADULT - SUBJECTIVE AND OBJECTIVE BOX
59y Male s/p Aortogram, Right renal angiogram, Right nephrostomy tube exchange on 6/19 with Interventional Radiology.     Patient seen and examined at bedside, resting comfortably. Patient reports slight TTP around insertion site.   Right NT w/ dressing c/d/i. Drainage bag with clear yellow urine (improved from yesterday's fruit punch colored hematuria). Nephrostomy tube forward flushed with 5cc NS without resistance or active leaking noted.     T(F): 99 (06-20-25 @ 12:37), Max: 99 (06-20-25 @ 12:37)  HR: 104 (06-20-25 @ 12:37) (99 - 111)  BP: 109/75 (06-20-25 @ 12:37) (103/73 - 138/85)  RR: 19 (06-20-25 @ 12:37) (17 - 19)  SpO2: 99% (06-20-25 @ 12:37) (95% - 100%)    LABS:                        8.6    7.62  )-----------( 348      ( 20 Jun 2025 11:31 )             25.2     06-20    137  |  102  |  11  ----------------------------<  96  4.2   |  25  |  0.83    Ca    9.0      20 Jun 2025 11:31  Phos  2.3     06-20  Mg     2.00     06-20      PT/INR - ( 19 Jun 2025 04:20 )   PT: 13.4 sec;   INR: 1.16 ratio    PTT - ( 19 Jun 2025 04:20 )  PTT:28.2 sec    I&O's Detail    19 Jun 2025 07:01  -  20 Jun 2025 07:00  --------------------------------------------------------  IN:    Oral Fluid: 220 mL  Total IN: 220 mL    OUT:    Colostomy (mL): 0 mL    Indwelling Catheter - Urethral (mL): 45 mL    Nephrostomy Tube (mL): 975 mL    Nephrostomy Tube (mL): 1275 mL  Total OUT: 2295 mL    Total NET: -2075 mL      20 Jun 2025 07:01  -  20 Jun 2025 15:03  --------------------------------------------------------  IN:  Total IN: 0 mL    OUT:    Nephrostomy Tube (mL): 400 mL    Nephrostomy Tube (mL): 250 mL  Total OUT: 650 mL    Total NET: -650 mL      PHYSICAL EXAM:  General: Nontoxic, resting comfortably in NAD  Right Nephrostomy tube: Dressing c/d/i. Surrounding area without ecchymosis, palpation to skin soft. Drainage bag with clear yellow urine (improved from yesterday's fruit punch colored hematuria post procedure). Nephrostomy tube forward flushed with 5cc NS without resistance or active leaking noted.

## 2025-06-20 NOTE — PROGRESS NOTE ADULT - PROBLEM SELECTOR PLAN 5
Sinus tach to 130s on 6/12 ekg w/ sinus tach. Likely iso pain vs. bleed vs. worsening sepsis +/- reflex from holding Metoprolol.     - Encourage home Klonopin use per patient usual schedule to avoid mild withdrawal symptoms  - Pain management and infection control   - plan per hematuria.   - holding home bblocker iso soft BPs  - Cont home diltiazem 240mg daily

## 2025-06-20 NOTE — PROGRESS NOTE ADULT - PROBLEM SELECTOR PLAN 3
has history of secondary syphillis which was dx ~5 years ago and treated, also treated ~1 year ago. Syphilis AB+ but confirmatory negative  - syphilis AB+ but negative confirmatory. Had outpt skin biopsy with derm -> showed rare spirochete   - s/p doxy 6/16 - 6/18, (outpatient ID provider is requesting for IV PCN course at time of discharge)  -ID following appreciate recs has history of secondary syphillis which was dx ~5 years ago and treated, also treated ~1 year ago. Syphilis AB+ but confirmatory negative  - syphilis AB+ but negative confirmatory. Had outpt skin biopsy with derm -> showed rare spirochete   - s/p doxy 6/16 - 6/18, (outpatient ID provider is requesting for IV PCN course at time of discharge)  -ID following appreciate recs, will likely require outpt course IV PCN

## 2025-06-21 LAB
ANION GAP SERPL CALC-SCNC: 11 MMOL/L — SIGNIFICANT CHANGE UP (ref 7–14)
BUN SERPL-MCNC: 10 MG/DL — SIGNIFICANT CHANGE UP (ref 7–23)
CALCIUM SERPL-MCNC: 9.2 MG/DL — SIGNIFICANT CHANGE UP (ref 8.4–10.5)
CHLORIDE SERPL-SCNC: 103 MMOL/L — SIGNIFICANT CHANGE UP (ref 98–107)
CO2 SERPL-SCNC: 23 MMOL/L — SIGNIFICANT CHANGE UP (ref 22–31)
CREAT SERPL-MCNC: 0.79 MG/DL — SIGNIFICANT CHANGE UP (ref 0.5–1.3)
EGFR: 103 ML/MIN/1.73M2 — SIGNIFICANT CHANGE UP
EGFR: 103 ML/MIN/1.73M2 — SIGNIFICANT CHANGE UP
GLUCOSE SERPL-MCNC: 102 MG/DL — HIGH (ref 70–99)
HCT VFR BLD CALC: 26.9 % — LOW (ref 39–50)
HGB BLD-MCNC: 8.5 G/DL — LOW (ref 13–17)
MAGNESIUM SERPL-MCNC: 2.1 MG/DL — SIGNIFICANT CHANGE UP (ref 1.6–2.6)
MCHC RBC-ENTMCNC: 28.9 PG — SIGNIFICANT CHANGE UP (ref 27–34)
MCHC RBC-ENTMCNC: 31.6 G/DL — LOW (ref 32–36)
MCV RBC AUTO: 91.5 FL — SIGNIFICANT CHANGE UP (ref 80–100)
NRBC # BLD AUTO: 0 K/UL — SIGNIFICANT CHANGE UP (ref 0–0)
NRBC # FLD: 0 K/UL — SIGNIFICANT CHANGE UP (ref 0–0)
NRBC BLD AUTO-RTO: 0 /100 WBCS — SIGNIFICANT CHANGE UP (ref 0–0)
PHOSPHATE SERPL-MCNC: 3.3 MG/DL — SIGNIFICANT CHANGE UP (ref 2.5–4.5)
PLATELET # BLD AUTO: 439 K/UL — HIGH (ref 150–400)
PMV BLD: 9.1 FL — SIGNIFICANT CHANGE UP (ref 7–13)
POTASSIUM SERPL-MCNC: 5.1 MMOL/L — SIGNIFICANT CHANGE UP (ref 3.5–5.3)
POTASSIUM SERPL-SCNC: 5.1 MMOL/L — SIGNIFICANT CHANGE UP (ref 3.5–5.3)
RBC # BLD: 2.94 M/UL — LOW (ref 4.2–5.8)
RBC # FLD: 16.5 % — HIGH (ref 10.3–14.5)
SODIUM SERPL-SCNC: 137 MMOL/L — SIGNIFICANT CHANGE UP (ref 135–145)
WBC # BLD: 6.5 K/UL — SIGNIFICANT CHANGE UP (ref 3.8–10.5)
WBC # FLD AUTO: 6.5 K/UL — SIGNIFICANT CHANGE UP (ref 3.8–10.5)

## 2025-06-21 PROCEDURE — 99233 SBSQ HOSP IP/OBS HIGH 50: CPT

## 2025-06-21 RX ORDER — HYDROMORPHONE/SOD CHLOR,ISO/PF 2 MG/10 ML
0.5 SYRINGE (ML) INJECTION EVERY 6 HOURS
Refills: 0 | Status: DISCONTINUED | OUTPATIENT
Start: 2025-06-21 | End: 2025-06-22

## 2025-06-21 RX ADMIN — OXYCODONE HYDROCHLORIDE 10 MILLIGRAM(S): 30 TABLET ORAL at 11:47

## 2025-06-21 RX ADMIN — BICTEGRAVIR SODIUM, EMTRICITABINE, AND TENOFOVIR ALAFENAMIDE FUMARATE 1 TABLET(S): 50; 200; 25 TABLET ORAL at 17:44

## 2025-06-21 RX ADMIN — Medication 5 MILLIGRAM(S): at 00:01

## 2025-06-21 RX ADMIN — POLYETHYLENE GLYCOL 3350 17 GRAM(S): 17 POWDER, FOR SOLUTION ORAL at 17:44

## 2025-06-21 RX ADMIN — CEFEPIME 100 MILLIGRAM(S): 2 INJECTION, POWDER, FOR SOLUTION INTRAVENOUS at 05:55

## 2025-06-21 RX ADMIN — POLYETHYLENE GLYCOL 3350 17 GRAM(S): 17 POWDER, FOR SOLUTION ORAL at 05:56

## 2025-06-21 RX ADMIN — Medication 5 MILLIGRAM(S): at 17:43

## 2025-06-21 RX ADMIN — OXYCODONE HYDROCHLORIDE 10 MILLIGRAM(S): 30 TABLET ORAL at 22:13

## 2025-06-21 RX ADMIN — OXYCODONE HYDROCHLORIDE 10 MILLIGRAM(S): 30 TABLET ORAL at 06:16

## 2025-06-21 RX ADMIN — OXYCODONE HYDROCHLORIDE 10 MILLIGRAM(S): 30 TABLET ORAL at 12:47

## 2025-06-21 RX ADMIN — CEFEPIME 100 MILLIGRAM(S): 2 INJECTION, POWDER, FOR SOLUTION INTRAVENOUS at 17:45

## 2025-06-21 RX ADMIN — OXYBUTYNIN CHLORIDE 5 MILLIGRAM(S): 5 TABLET, FILM COATED, EXTENDED RELEASE ORAL at 17:43

## 2025-06-21 RX ADMIN — Medication 5 MILLIGRAM(S): at 05:56

## 2025-06-21 RX ADMIN — Medication 0.5 MILLIGRAM(S): at 13:14

## 2025-06-21 RX ADMIN — Medication 50 MILLIGRAM(S): at 21:14

## 2025-06-21 RX ADMIN — Medication 0.5 MILLIGRAM(S): at 12:59

## 2025-06-21 RX ADMIN — Medication 1 APPLICATION(S): at 12:38

## 2025-06-21 RX ADMIN — Medication 975 MILLIGRAM(S): at 06:53

## 2025-06-21 RX ADMIN — OXYCODONE HYDROCHLORIDE 10 MILLIGRAM(S): 30 TABLET ORAL at 21:13

## 2025-06-21 RX ADMIN — Medication 2000 UNIT(S): at 12:36

## 2025-06-21 RX ADMIN — EZETIMIBE 10 MILLIGRAM(S): 10 TABLET ORAL at 21:15

## 2025-06-21 RX ADMIN — OXYCODONE HYDROCHLORIDE 10 MILLIGRAM(S): 30 TABLET ORAL at 07:50

## 2025-06-21 RX ADMIN — BUPROPION HYDROBROMIDE 150 MILLIGRAM(S): 522 TABLET, EXTENDED RELEASE ORAL at 12:36

## 2025-06-21 RX ADMIN — Medication 1 TABLET(S): at 12:36

## 2025-06-21 RX ADMIN — OXYBUTYNIN CHLORIDE 5 MILLIGRAM(S): 5 TABLET, FILM COATED, EXTENDED RELEASE ORAL at 05:56

## 2025-06-21 RX ADMIN — Medication 975 MILLIGRAM(S): at 05:56

## 2025-06-21 NOTE — PROGRESS NOTE ADULT - ATTENDING COMMENTS
Pt seen and examined, agree with the note done by HS, briefly this is a 59M w/ hx of prostate + anal cancer (s/p chemotherapy + radiation in 2020 c/b bilateral nephrostomy tubes and ostomy), HIV on Biktarvy, HTN, HLD, anxiety/depression presenting for bilateral flank pain and hematuria found to have positive UA and hematuria on PCUN.     #Hematuria: urology consulted, recommends pcun to pcn conversion be IR.  He was undergoing conversion of PCNU's to PCN's to prevent refluxing up the PCNU's and able to have successful conversion of the R side when patient became hypotensive, hemorrhage with clots,  s/p RRT 6/16 given hemorrhagic shock, s/p PRBC and IVF and MICU stay. 617: s/p OR with cystoscopy, clot evacuation, fulguration. Per Urology, After clot evacuation no obvious bleeding noted in the bladder and no suspicion of radiation cystitis. Patients hematuria likely coming from the upper tracts. s/p IR Aortogram, Right renal angiogram, Right nephrostomy tube exchange 6/19. Aortography demonstrates patent iliac vessels with no pseudoaneurysm or active extravasation. Right renal angiogram with and without nephrostomy demonstrates no pseudoaneurysm or active extravasation.    Will f/u IR when to dc    #Sepsis 2/2 pyelonephritis. ID cs, cont cefepime, f/u ID. fu urine culture >100K Enterobacter and pseudomonas, cefepime sens, BCX Neg.  pain control    #hx of secondary syphilis, prior treatment with IM Bicillin (multiple treatment), fu with ID re RPR +, but confirmatory test neg, Outpt ID doc requesting treatment for Syphilis given +rare spirochetes in Derm Punch biopsy done outpt 6/3 per chart review. Plan for penicillin treatment per ID    #Acute Blood Loss Anemia: due t hematuria, s/p PRBC, s/w monitoring, hematuria improving, Trend HB, PRBC if HB < 7    # HTN c/w BP meds and trend     # HIV: c/w ART    # Dvt ppx: scds .   Plan discussed with HS

## 2025-06-21 NOTE — PROGRESS NOTE ADULT - PROBLEM SELECTOR PLAN 2
UA positive with bilateral nephrostomy site pain consistent with complex UTI without clinical or radiographic signs of pyelonephritis. Has a history of both sensitive and carbapenem-resistant pseudomonas. During hospital course, worsening tachycardia and pain with white count elevating.     - Zosyn (6/10PM -6/12), Cefepime 2g Q12 (6/12-21) for 10 day course  - UCx w/ >100k GNR; pending speciation, drawn from Nephrostomy tube  - ID following, appreciate recs  - standing tylenol for pain, oxycodone prn for moderate/severe pain

## 2025-06-21 NOTE — PROGRESS NOTE ADULT - PROBLEM SELECTOR PLAN 1
Dark colored in bilateral nephrostomy tubes. Increased hematuria on 6/11 in R bag with clots. Confirmed clot in bladder on 6/13 KUB US.  IR converted nephroureterostomy tubes to nephrostomy tubes on 6/16 to reduce bladder irritation and allow bladder bleeding to tamponade off however c/b RRT for hypotension -> went to MICU did not require pressors   Underwent Cysto, clot evacuation, fulguration 6/18, hematuria likely coming from R kidney  s/p IR 6/19 for Right renal angiogram with and without nephrostomy in situ demonstrates no pseudoaneurysm or active extravasation, Right 12Fr nephrostomy exchange.  - trend cbc  - Maintain active T&S  - transfuse PRN hgb <7 or for active bleeding  - per urology, unclear etiology of hematuria but not 2/2 radiation cystitis from the bladder and not a psuedoanuerysym, May be 2/2 to infection and irritation from tubes. Can remove davis catheter and patient can follow with urology/IR outpatient for continued management of PCNU/PCN. Urology signed off

## 2025-06-21 NOTE — PROGRESS NOTE ADULT - PROBLEM SELECTOR PLAN 3
has history of secondary syphillis which was dx ~5 years ago and treated, also treated ~1 year ago. Syphilis AB+ but confirmatory negative  - syphilis AB+ but negative confirmatory. Had outpt skin biopsy with derm -> showed rare spirochete   - s/p doxy 6/16 - 6/18, (outpatient ID provider is requesting for IV PCN course at time of discharge)  -ID following appreciate recs, will likely require outpt course IV PCN

## 2025-06-21 NOTE — PROGRESS NOTE ADULT - PROBLEM SELECTOR PLAN 4
On diltiazem 240mg and metoprolol [id number] at home. Follows with cardiology but has no documented tachyarrhythmia history. Not septic during this admission.    - Continue home diltiazem 240 mg  - holding home bblocker iso soft BPs  - inpatient BP goals: SBP <140 (given hematuria)

## 2025-06-21 NOTE — PROGRESS NOTE ADULT - SUBJECTIVE AND OBJECTIVE BOX
***********************************************  Eric Romero MD  Internal Medicine   PGY2  ***********************************************      PROGRESS NOTE:     Patient is a 59y old  Male who presents with a chief complaint of UTI w/ bilateral nephrostomy tubes (20 Jun 2025 15:03)      SUBJECTIVE / OVERNIGHT EVENTS:    OVERNIGHT:       Pt seen in AM at bedside, resting comfortably in bed, and does not appear to be in any acute distress. When asked, pt denies any recent or active fever, chills, nausea, vomiting, headache, acute sob, chest pain, abdominal pain, genitourinary sx, extremity pain or swelling.          MEDICATIONS  (STANDING):  acetaminophen     Tablet .. 975 milliGRAM(s) Oral every 8 hours  bictegravir 50 mG/emtricitabine 200 mG/tenofovir alafenamide 25 mG (BIKTARVY) 1 Tablet(s) Oral daily  bisacodyl 5 milliGRAM(s) Oral every 12 hours  buPROPion XL (24-Hour) . 150 milliGRAM(s) Oral daily  cefepime   IVPB      cefepime   IVPB 2000 milliGRAM(s) IV Intermittent every 12 hours  chlorhexidine 2% Cloths 1 Application(s) Topical daily  cholecalciferol 2000 Unit(s) Oral daily  ezetimibe 10 milliGRAM(s) Oral at bedtime  multivitamin 1 Tablet(s) Oral daily  oxybutynin 5 milliGRAM(s) Oral every 8 hours  polyethylene glycol 3350 17 Gram(s) Oral two times a day  traZODone 50 milliGRAM(s) Oral at bedtime    MEDICATIONS  (PRN):  oxyCODONE    IR 10 milliGRAM(s) Oral every 4 hours PRN Severe Pain (7 - 10)  oxyCODONE    IR 5 milliGRAM(s) Oral every 4 hours PRN Moderate Pain (4 - 6)  zolpidem 5 milliGRAM(s) Oral at bedtime PRN Insomnia      CAPILLARY BLOOD GLUCOSE        I&O's Summary    20 Jun 2025 07:01  -  21 Jun 2025 07:00  --------------------------------------------------------  IN: 410 mL / OUT: 2550 mL / NET: -2140 mL        PHYSICAL EXAM:  Vital Signs Last 24 Hrs  T(C): 36.6 (21 Jun 2025 05:22), Max: 37.2 (20 Jun 2025 12:37)  T(F): 97.8 (21 Jun 2025 05:22), Max: 99 (20 Jun 2025 12:37)  HR: 96 (21 Jun 2025 05:22) (96 - 114)  BP: 123/79 (21 Jun 2025 05:22) (109/75 - 132/82)  BP(mean): --  RR: 18 (21 Jun 2025 05:22) (18 - 19)  SpO2: 100% (21 Jun 2025 05:22) (98% - 100%)    Parameters below as of 21 Jun 2025 05:22  Patient On (Oxygen Delivery Method): room air        CONSTITUTIONAL: NAD; well-developed  HEENT: PERRL, clear conjunctiva  RESPIRATORY: Normal respiratory effort; lungs are clear to auscultation bilaterally; No Crackles/Rhonchi/Wheezing  CARDIOVASCULAR: Regular rate and rhythm, normal S1 and S2, no murmur/rub/gallop; No lower extremity edema; Peripheral pulses are 2+ bilaterally  ABDOMEN: Nontender to palpation, normoactive bowel sounds, no rebound/guarding; No hepatosplenomegaly  MUSCULOSKELETAL: no clubbing or cyanosis of digits; no joint swelling or tenderness to palpation  EXTREMITY: Lower extremities Non-tender to palpation; non-erythematous B/L  NEURO: A&Ox3; no focal deficits   PSYCH: normal mood; Affect appropirate    LABS:                        8.5    6.50  )-----------( 439      ( 21 Jun 2025 07:20 )             26.9     06-21    137  |  103  |  10  ----------------------------<  102[H]  5.1   |  23  |  0.79    Ca    9.2      21 Jun 2025 07:20  Phos  3.3     06-21  Mg     2.10     06-21            Urinalysis Basic - ( 21 Jun 2025 07:20 )    Color: x / Appearance: x / SG: x / pH: x  Gluc: 102 mg/dL / Ketone: x  / Bili: x / Urobili: x   Blood: x / Protein: x / Nitrite: x   Leuk Esterase: x / RBC: x / WBC x   Sq Epi: x / Non Sq Epi: x / Bacteria: x          RADIOLOGY & ADDITIONAL TESTS:  Results Reviewed:   Imaging Personally Reviewed:  Electrocardiogram Personally Reviewed:    COORDINATION OF CARE:  Care Discussed with Consultants/Other Providers [Y/N]:  Prior or Outpatient Records Reviewed [Y/N]:   ***********************************************  Eric Romero MD  Internal Medicine   PGY2  ***********************************************      PROGRESS NOTE:     Patient is a 59y old  Male who presents with a chief complaint of UTI w/ bilateral nephrostomy tubes (20 Jun 2025 15:03)      SUBJECTIVE / OVERNIGHT EVENTS:    OVERNIGHT: No overnight events       Pt seen in AM at bedside, resting comfortably in bed, and does not appear to be in any acute distress. Endorsing flank pain, but unchanged from prior. Says Oxycodone only slightly helps, wants to have IV pain medication again. Rest of ROS is negative.           MEDICATIONS  (STANDING):  acetaminophen     Tablet .. 975 milliGRAM(s) Oral every 8 hours  bictegravir 50 mG/emtricitabine 200 mG/tenofovir alafenamide 25 mG (BIKTARVY) 1 Tablet(s) Oral daily  bisacodyl 5 milliGRAM(s) Oral every 12 hours  buPROPion XL (24-Hour) . 150 milliGRAM(s) Oral daily  cefepime   IVPB      cefepime   IVPB 2000 milliGRAM(s) IV Intermittent every 12 hours  chlorhexidine 2% Cloths 1 Application(s) Topical daily  cholecalciferol 2000 Unit(s) Oral daily  ezetimibe 10 milliGRAM(s) Oral at bedtime  multivitamin 1 Tablet(s) Oral daily  oxybutynin 5 milliGRAM(s) Oral every 8 hours  polyethylene glycol 3350 17 Gram(s) Oral two times a day  traZODone 50 milliGRAM(s) Oral at bedtime    MEDICATIONS  (PRN):  oxyCODONE    IR 10 milliGRAM(s) Oral every 4 hours PRN Severe Pain (7 - 10)  oxyCODONE    IR 5 milliGRAM(s) Oral every 4 hours PRN Moderate Pain (4 - 6)  zolpidem 5 milliGRAM(s) Oral at bedtime PRN Insomnia      CAPILLARY BLOOD GLUCOSE        I&O's Summary    20 Jun 2025 07:01  -  21 Jun 2025 07:00  --------------------------------------------------------  IN: 410 mL / OUT: 2550 mL / NET: -2140 mL        PHYSICAL EXAM:  Vital Signs Last 24 Hrs  T(C): 36.6 (21 Jun 2025 05:22), Max: 37.2 (20 Jun 2025 12:37)  T(F): 97.8 (21 Jun 2025 05:22), Max: 99 (20 Jun 2025 12:37)  HR: 96 (21 Jun 2025 05:22) (96 - 114)  BP: 123/79 (21 Jun 2025 05:22) (109/75 - 132/82)  BP(mean): --  RR: 18 (21 Jun 2025 05:22) (18 - 19)  SpO2: 100% (21 Jun 2025 05:22) (98% - 100%)    Parameters below as of 21 Jun 2025 05:22  Patient On (Oxygen Delivery Method): room air        CONSTITUTIONAL: NAD; well-developed  HEENT: PERRL, clear conjunctiva  RESPIRATORY: Normal respiratory effort; lungs are clear to auscultation bilaterally; No Crackles/Rhonchi/Wheezing  CARDIOVASCULAR: Regular rate and rhythm, normal S1 and S2, no murmur/rub/gallop; No lower extremity edema; Peripheral pulses are 2+ bilaterally  ABDOMEN: Nontender to palpation, normoactive bowel sounds, no rebound/guarding; No hepatosplenomegaly  MUSCULOSKELETAL: no clubbing or cyanosis of digits; no joint swelling or tenderness to palpation  EXTREMITY: Lower extremities Non-tender to palpation; non-erythematous B/L  NEURO: A&Ox3; no focal deficits   PSYCH: normal mood; Affect appropirate    LABS:                        8.5    6.50  )-----------( 439      ( 21 Jun 2025 07:20 )             26.9     06-21    137  |  103  |  10  ----------------------------<  102[H]  5.1   |  23  |  0.79    Ca    9.2      21 Jun 2025 07:20  Phos  3.3     06-21  Mg     2.10     06-21            Urinalysis Basic - ( 21 Jun 2025 07:20 )    Color: x / Appearance: x / SG: x / pH: x  Gluc: 102 mg/dL / Ketone: x  / Bili: x / Urobili: x   Blood: x / Protein: x / Nitrite: x   Leuk Esterase: x / RBC: x / WBC x   Sq Epi: x / Non Sq Epi: x / Bacteria: x          RADIOLOGY & ADDITIONAL TESTS:  Results Reviewed:   Imaging Personally Reviewed:  Electrocardiogram Personally Reviewed:    COORDINATION OF CARE:  Care Discussed with Consultants/Other Providers [Y/N]:  Prior or Outpatient Records Reviewed [Y/N]:   ***********************************************  Eric Romero MD  Internal Medicine   PGY2  ***********************************************      PROGRESS NOTE:     Patient is a 59y old  Male who presents with a chief complaint of UTI w/ bilateral nephrostomy tubes (20 Jun 2025 15:03)      SUBJECTIVE / OVERNIGHT EVENTS:    OVERNIGHT: No overnight events       Pt seen in AM at bedside, resting comfortably in bed, and does not appear to be in any acute distress. Endorsing flank pain, but unchanged from prior. Says Oxycodone only slightly helps, wants to have IV pain medication again. Rest of ROS is negative.           MEDICATIONS  (STANDING):  acetaminophen     Tablet .. 975 milliGRAM(s) Oral every 8 hours  bictegravir 50 mG/emtricitabine 200 mG/tenofovir alafenamide 25 mG (BIKTARVY) 1 Tablet(s) Oral daily  bisacodyl 5 milliGRAM(s) Oral every 12 hours  buPROPion XL (24-Hour) . 150 milliGRAM(s) Oral daily  cefepime   IVPB      cefepime   IVPB 2000 milliGRAM(s) IV Intermittent every 12 hours  chlorhexidine 2% Cloths 1 Application(s) Topical daily  cholecalciferol 2000 Unit(s) Oral daily  ezetimibe 10 milliGRAM(s) Oral at bedtime  multivitamin 1 Tablet(s) Oral daily  oxybutynin 5 milliGRAM(s) Oral every 8 hours  polyethylene glycol 3350 17 Gram(s) Oral two times a day  traZODone 50 milliGRAM(s) Oral at bedtime    MEDICATIONS  (PRN):  oxyCODONE    IR 10 milliGRAM(s) Oral every 4 hours PRN Severe Pain (7 - 10)  oxyCODONE    IR 5 milliGRAM(s) Oral every 4 hours PRN Moderate Pain (4 - 6)  zolpidem 5 milliGRAM(s) Oral at bedtime PRN Insomnia      CAPILLARY BLOOD GLUCOSE        I&O's Summary    20 Jun 2025 07:01  -  21 Jun 2025 07:00  --------------------------------------------------------  IN: 410 mL / OUT: 2550 mL / NET: -2140 mL        PHYSICAL EXAM:  Vital Signs Last 24 Hrs  T(C): 36.6 (21 Jun 2025 05:22), Max: 37.2 (20 Jun 2025 12:37)  T(F): 97.8 (21 Jun 2025 05:22), Max: 99 (20 Jun 2025 12:37)  HR: 96 (21 Jun 2025 05:22) (96 - 114)  BP: 123/79 (21 Jun 2025 05:22) (109/75 - 132/82)  BP(mean): --  RR: 18 (21 Jun 2025 05:22) (18 - 19)  SpO2: 100% (21 Jun 2025 05:22) (98% - 100%)    Parameters below as of 21 Jun 2025 05:22  Patient On (Oxygen Delivery Method): room air        CONSTITUTIONAL: NAD; well-developed  HEENT: PERRL, clear conjunctiva  RESPIRATORY: Normal respiratory effort; lungs are clear to auscultation bilaterally; No Crackles/Rhonchi/Wheezing  CARDIOVASCULAR: Regular rate and rhythm, normal S1 and S2, no murmur/rub/gallop; No lower extremity edema; Peripheral pulses are 2+ bilaterally  ABDOMEN: Nontender to palpation, normoactive bowel sounds, no rebound/guarding; No hepatosplenomegaly, + ostomy  MUSCULOSKELETAL: no clubbing or cyanosis of digits; no joint swelling or tenderness to palpation  EXTREMITY: Lower extremities Non-tender to palpation; non-erythematous B/L, b/l Nephrostomy tubes+  NEURO: A&Ox3; no focal deficits   PSYCH: normal mood; Affect appropirate    LABS:                        8.5    6.50  )-----------( 439      ( 21 Jun 2025 07:20 )             26.9     06-21    137  |  103  |  10  ----------------------------<  102[H]  5.1   |  23  |  0.79    Ca    9.2      21 Jun 2025 07:20  Phos  3.3     06-21  Mg     2.10     06-21            Urinalysis Basic - ( 21 Jun 2025 07:20 )    Color: x / Appearance: x / SG: x / pH: x  Gluc: 102 mg/dL / Ketone: x  / Bili: x / Urobili: x   Blood: x / Protein: x / Nitrite: x   Leuk Esterase: x / RBC: x / WBC x   Sq Epi: x / Non Sq Epi: x / Bacteria: x          RADIOLOGY & ADDITIONAL TESTS:  Results Reviewed:   Imaging Personally Reviewed:  Electrocardiogram Personally Reviewed:    COORDINATION OF CARE:  Care Discussed with Consultants/Other Providers [Y/N]:  Prior or Outpatient Records Reviewed [Y/N]:

## 2025-06-22 LAB
ANION GAP SERPL CALC-SCNC: 13 MMOL/L — SIGNIFICANT CHANGE UP (ref 7–14)
BUN SERPL-MCNC: 12 MG/DL — SIGNIFICANT CHANGE UP (ref 7–23)
CALCIUM SERPL-MCNC: 8.7 MG/DL — SIGNIFICANT CHANGE UP (ref 8.4–10.5)
CHLORIDE SERPL-SCNC: 105 MMOL/L — SIGNIFICANT CHANGE UP (ref 98–107)
CO2 SERPL-SCNC: 22 MMOL/L — SIGNIFICANT CHANGE UP (ref 22–31)
CREAT SERPL-MCNC: 0.9 MG/DL — SIGNIFICANT CHANGE UP (ref 0.5–1.3)
EGFR: 99 ML/MIN/1.73M2 — SIGNIFICANT CHANGE UP
EGFR: 99 ML/MIN/1.73M2 — SIGNIFICANT CHANGE UP
GLUCOSE SERPL-MCNC: 90 MG/DL — SIGNIFICANT CHANGE UP (ref 70–99)
HCT VFR BLD CALC: 26.2 % — LOW (ref 39–50)
HGB BLD-MCNC: 8.6 G/DL — LOW (ref 13–17)
MAGNESIUM SERPL-MCNC: 2.1 MG/DL — SIGNIFICANT CHANGE UP (ref 1.6–2.6)
MCHC RBC-ENTMCNC: 29.9 PG — SIGNIFICANT CHANGE UP (ref 27–34)
MCHC RBC-ENTMCNC: 32.8 G/DL — SIGNIFICANT CHANGE UP (ref 32–36)
MCV RBC AUTO: 91 FL — SIGNIFICANT CHANGE UP (ref 80–100)
NRBC # BLD AUTO: 0 K/UL — SIGNIFICANT CHANGE UP (ref 0–0)
NRBC # FLD: 0 K/UL — SIGNIFICANT CHANGE UP (ref 0–0)
NRBC BLD AUTO-RTO: 0 /100 WBCS — SIGNIFICANT CHANGE UP (ref 0–0)
PHOSPHATE SERPL-MCNC: 2.7 MG/DL — SIGNIFICANT CHANGE UP (ref 2.5–4.5)
PLATELET # BLD AUTO: 424 K/UL — HIGH (ref 150–400)
PMV BLD: 8.6 FL — SIGNIFICANT CHANGE UP (ref 7–13)
POTASSIUM SERPL-MCNC: 4.1 MMOL/L — SIGNIFICANT CHANGE UP (ref 3.5–5.3)
POTASSIUM SERPL-SCNC: 4.1 MMOL/L — SIGNIFICANT CHANGE UP (ref 3.5–5.3)
RBC # BLD: 2.88 M/UL — LOW (ref 4.2–5.8)
RBC # FLD: 16.3 % — HIGH (ref 10.3–14.5)
SODIUM SERPL-SCNC: 140 MMOL/L — SIGNIFICANT CHANGE UP (ref 135–145)
WBC # BLD: 5.5 K/UL — SIGNIFICANT CHANGE UP (ref 3.8–10.5)
WBC # FLD AUTO: 5.5 K/UL — SIGNIFICANT CHANGE UP (ref 3.8–10.5)

## 2025-06-22 PROCEDURE — 99232 SBSQ HOSP IP/OBS MODERATE 35: CPT | Mod: GC

## 2025-06-22 RX ORDER — HYDROMORPHONE/SOD CHLOR,ISO/PF 2 MG/10 ML
2 SYRINGE (ML) INJECTION EVERY 6 HOURS
Refills: 0 | Status: DISCONTINUED | OUTPATIENT
Start: 2025-06-22 | End: 2025-06-24

## 2025-06-22 RX ADMIN — Medication 1 APPLICATION(S): at 11:38

## 2025-06-22 RX ADMIN — OXYBUTYNIN CHLORIDE 5 MILLIGRAM(S): 5 TABLET, FILM COATED, EXTENDED RELEASE ORAL at 17:48

## 2025-06-22 RX ADMIN — OXYCODONE HYDROCHLORIDE 10 MILLIGRAM(S): 30 TABLET ORAL at 08:48

## 2025-06-22 RX ADMIN — BUPROPION HYDROBROMIDE 150 MILLIGRAM(S): 522 TABLET, EXTENDED RELEASE ORAL at 11:35

## 2025-06-22 RX ADMIN — Medication 2 MILLIGRAM(S): at 21:31

## 2025-06-22 RX ADMIN — CEFEPIME 100 MILLIGRAM(S): 2 INJECTION, POWDER, FOR SOLUTION INTRAVENOUS at 05:18

## 2025-06-22 RX ADMIN — POLYETHYLENE GLYCOL 3350 17 GRAM(S): 17 POWDER, FOR SOLUTION ORAL at 05:19

## 2025-06-22 RX ADMIN — OXYBUTYNIN CHLORIDE 5 MILLIGRAM(S): 5 TABLET, FILM COATED, EXTENDED RELEASE ORAL at 08:48

## 2025-06-22 RX ADMIN — Medication 2000 UNIT(S): at 11:36

## 2025-06-22 RX ADMIN — OXYBUTYNIN CHLORIDE 5 MILLIGRAM(S): 5 TABLET, FILM COATED, EXTENDED RELEASE ORAL at 00:29

## 2025-06-22 RX ADMIN — Medication 50 MILLIGRAM(S): at 21:30

## 2025-06-22 RX ADMIN — POLYETHYLENE GLYCOL 3350 17 GRAM(S): 17 POWDER, FOR SOLUTION ORAL at 17:47

## 2025-06-22 RX ADMIN — Medication 5 MILLIGRAM(S): at 17:47

## 2025-06-22 RX ADMIN — OXYCODONE HYDROCHLORIDE 10 MILLIGRAM(S): 30 TABLET ORAL at 09:48

## 2025-06-22 RX ADMIN — Medication 5 MILLIGRAM(S): at 00:29

## 2025-06-22 RX ADMIN — BICTEGRAVIR SODIUM, EMTRICITABINE, AND TENOFOVIR ALAFENAMIDE FUMARATE 1 TABLET(S): 50; 200; 25 TABLET ORAL at 11:36

## 2025-06-22 RX ADMIN — Medication 2 MILLIGRAM(S): at 12:35

## 2025-06-22 RX ADMIN — CEFEPIME 100 MILLIGRAM(S): 2 INJECTION, POWDER, FOR SOLUTION INTRAVENOUS at 17:47

## 2025-06-22 RX ADMIN — Medication 1 TABLET(S): at 11:36

## 2025-06-22 RX ADMIN — Medication 5 MILLIGRAM(S): at 05:19

## 2025-06-22 RX ADMIN — Medication 2 MILLIGRAM(S): at 11:35

## 2025-06-22 RX ADMIN — Medication 2 MILLIGRAM(S): at 22:31

## 2025-06-22 RX ADMIN — EZETIMIBE 10 MILLIGRAM(S): 10 TABLET ORAL at 21:31

## 2025-06-22 NOTE — PROGRESS NOTE ADULT - ATTENDING COMMENTS
Pt seen and examined, agree with the note done by HS, briefly this is a 59M w/ hx of prostate + anal cancer (s/p chemotherapy + radiation in 2020 c/b bilateral nephrostomy tubes and ostomy), HIV on Biktarvy, HTN, HLD, anxiety/depression presenting for bilateral flank pain and hematuria found to have positive UA and hematuria on PCUN.     #Hematuria: urology consulted, recommends pcun to pcn conversion be IR.  He was undergoing conversion of PCNU's to PCN's to prevent refluxing up the PCNU's and able to have successful conversion of the R side when patient became hypotensive, hemorrhage with clots,  s/p RRT 6/16 given hemorrhagic shock, s/p PRBC and IVF and MICU stay. 617: s/p OR with cystoscopy, clot evacuation, fulguration. Per Urology, After clot evacuation no obvious bleeding noted in the bladder and no suspicion of radiation cystitis. Patients hematuria likely coming from the upper tracts. s/p IR Aortogram, Right renal angiogram, Right nephrostomy tube exchange 6/19. Aortography demonstrates patent iliac vessels with no pseudoaneurysm or active extravasation. Right renal angiogram with and without nephrostomy demonstrates no pseudoaneurysm or active extravasation.    Will f/u IR when to dc    #Sepsis 2/2 pyelonephritis. ID cs, cont cefepime, f/u ID. fu urine culture >100K Enterobacter and pseudomonas, cefepime sens, BCX Neg.  pain control, oxy changed to dilaudid po for better pain control    #hx of secondary syphilis, prior treatment with IM Bicillin (multiple treatment), fu with ID re RPR +, but confirmatory test neg, Outpt ID doc requesting treatment for Syphilis given +rare spirochetes in Derm Punch biopsy done outpt 6/3 per chart review. Plan for penicillin treatment per ID    #Acute Blood Loss Anemia: due t hematuria, s/p PRBC, s/w monitoring, hematuria improving, Trend HB, PRBC if HB < 7    # HTN c/w BP meds and trend     # HIV: c/w ART    # Dvt ppx: scds .   Plan discussed with HS .

## 2025-06-22 NOTE — PROGRESS NOTE ADULT - SUBJECTIVE AND OBJECTIVE BOX
Roman Franz  PGY-2 Resident Physician     Patient is a 59y old  Male who presents with a chief complaint of UTI w/ bilateral nephrostomy tubes (21 Jun 2025 10:04)    SUBJECTIVE / OVERNIGHT EVENTS:  -NAEON  -no blood in urine  -continued w/ pain    MEDICATIONS  (STANDING):  acetaminophen     Tablet .. 975 milliGRAM(s) Oral every 8 hours  bictegravir 50 mG/emtricitabine 200 mG/tenofovir alafenamide 25 mG (BIKTARVY) 1 Tablet(s) Oral daily  bisacodyl 5 milliGRAM(s) Oral every 12 hours  buPROPion XL (24-Hour) . 150 milliGRAM(s) Oral daily  cefepime   IVPB      cefepime   IVPB 2000 milliGRAM(s) IV Intermittent every 12 hours  chlorhexidine 2% Cloths 1 Application(s) Topical daily  cholecalciferol 2000 Unit(s) Oral daily  ezetimibe 10 milliGRAM(s) Oral at bedtime  multivitamin 1 Tablet(s) Oral daily  oxybutynin 5 milliGRAM(s) Oral every 8 hours  polyethylene glycol 3350 17 Gram(s) Oral two times a day  traZODone 50 milliGRAM(s) Oral at bedtime    MEDICATIONS  (PRN):  HYDROmorphone  Injectable 0.5 milliGRAM(s) IV Push every 6 hours PRN breakthrough pain  oxyCODONE    IR 10 milliGRAM(s) Oral every 4 hours PRN Severe Pain (7 - 10)  oxyCODONE    IR 5 milliGRAM(s) Oral every 4 hours PRN Moderate Pain (4 - 6)  zolpidem 5 milliGRAM(s) Oral at bedtime PRN Insomnia    Allergies    No Known Allergies    Intolerances        Vital Signs Last 24 Hrs  T(C): 36.9 (22 Jun 2025 05:09), Max: 37.3 (21 Jun 2025 21:15)  T(F): 98.4 (22 Jun 2025 05:09), Max: 99.1 (21 Jun 2025 21:15)  HR: 94 (22 Jun 2025 05:09) (94 - 109)  BP: 133/89 (22 Jun 2025 05:09) (114/84 - 133/89)  BP(mean): --  RR: 18 (22 Jun 2025 05:09) (18 - 19)  SpO2: 100% (22 Jun 2025 05:09) (97% - 100%)    Parameters below as of 22 Jun 2025 05:09  Patient On (Oxygen Delivery Method): room air      Daily     Daily   I&O's Summary    21 Jun 2025 07:01  -  22 Jun 2025 07:00  --------------------------------------------------------  IN: 630 mL / OUT: 1300 mL / NET: -670 mL        Physical Exam  CONSTITUTIONAL: NAD; well-developed  HEENT: PERRL, clear conjunctiva  RESPIRATORY: Normal respiratory effort; lungs are clear to auscultation bilaterally; No Crackles/Rhonchi/Wheezing  CARDIOVASCULAR: Regular rate and rhythm, normal S1 and S2, no murmur/rub/gallop; No lower extremity edema; Peripheral pulses are 2+ bilaterally  ABDOMEN: Nontender to palpation, normoactive bowel sounds, no rebound/guarding; No hepatosplenomegaly, + ostomy  MUSCULOSKELETAL: no clubbing or cyanosis of digits; no joint swelling or tenderness to palpation  EXTREMITY: Lower extremities Non-tender to palpation; non-erythematous B/L, b/l Nephrostomy tubes+  NEURO: A&Ox3; no focal deficits   PSYCH: normal mood; Affect appropirate    DIAGNOSTICS:                         8.6    5.50  )-----------( 424      ( 22 Jun 2025 06:30 )             26.2     Hgb Trend: 8.6<--, 8.5<--, 8.6<--, 7.9<--, 7.8<--  06-22    140  |  105  |  12  ----------------------------<  90  4.1   |  22  |  0.90    Ca    8.7      22 Jun 2025 06:30  Phos  2.7     06-22  Mg     2.10     06-22      CAPILLARY BLOOD GLUCOSE        Creatinine Trend: 0.90<--, 0.79<--, 0.83<--, 0.89<--, 0.94<--, 1.07<--          Urinalysis Basic - ( 22 Jun 2025 06:30 )    Color: x / Appearance: x / SG: x / pH: x  Gluc: 90 mg/dL / Ketone: x  / Bili: x / Urobili: x   Blood: x / Protein: x / Nitrite: x   Leuk Esterase: x / RBC: x / WBC x   Sq Epi: x / Non Sq Epi: x / Bacteria: x

## 2025-06-22 NOTE — PROGRESS NOTE ADULT - PROBLEM SELECTOR PLAN 4
On diltiazem 240mg and metoprolol [id number] at home. Follows with cardiology but has no documented tachyarrhythmia history. Not septic during this admission.    - Continue home diltiazem 240 mg  - holding home bblocker iso soft BPs  - inpatient BP goals: SBP <140 (given hematuria)

## 2025-06-23 ENCOUNTER — TRANSCRIPTION ENCOUNTER (OUTPATIENT)
Age: 60
End: 2025-06-23

## 2025-06-23 LAB
ALBUMIN SERPL ELPH-MCNC: 3.1 G/DL — LOW (ref 3.3–5)
ALP SERPL-CCNC: 66 U/L — SIGNIFICANT CHANGE UP (ref 40–120)
ALT FLD-CCNC: 10 U/L — SIGNIFICANT CHANGE UP (ref 4–41)
ANION GAP SERPL CALC-SCNC: 6 MMOL/L — LOW (ref 7–14)
AST SERPL-CCNC: 13 U/L — SIGNIFICANT CHANGE UP (ref 4–40)
BILIRUB SERPL-MCNC: <0.2 MG/DL — SIGNIFICANT CHANGE UP (ref 0.2–1.2)
BUN SERPL-MCNC: 13 MG/DL — SIGNIFICANT CHANGE UP (ref 7–23)
CALCIUM SERPL-MCNC: 8.3 MG/DL — LOW (ref 8.4–10.5)
CHLORIDE SERPL-SCNC: 104 MMOL/L — SIGNIFICANT CHANGE UP (ref 98–107)
CO2 SERPL-SCNC: 21 MMOL/L — LOW (ref 22–31)
CREAT SERPL-MCNC: 0.86 MG/DL — SIGNIFICANT CHANGE UP (ref 0.5–1.3)
EGFR: 100 ML/MIN/1.73M2 — SIGNIFICANT CHANGE UP
EGFR: 100 ML/MIN/1.73M2 — SIGNIFICANT CHANGE UP
GLUCOSE SERPL-MCNC: 264 MG/DL — HIGH (ref 70–99)
HCT VFR BLD CALC: 24.6 % — LOW (ref 39–50)
HGB BLD-MCNC: 7.9 G/DL — LOW (ref 13–17)
MAGNESIUM SERPL-MCNC: 2.1 MG/DL — SIGNIFICANT CHANGE UP (ref 1.6–2.6)
MCHC RBC-ENTMCNC: 30 PG — SIGNIFICANT CHANGE UP (ref 27–34)
MCHC RBC-ENTMCNC: 32.1 G/DL — SIGNIFICANT CHANGE UP (ref 32–36)
MCV RBC AUTO: 93.5 FL — SIGNIFICANT CHANGE UP (ref 80–100)
NRBC # BLD AUTO: 0 K/UL — SIGNIFICANT CHANGE UP (ref 0–0)
NRBC # FLD: 0 K/UL — SIGNIFICANT CHANGE UP (ref 0–0)
NRBC BLD AUTO-RTO: 0 /100 WBCS — SIGNIFICANT CHANGE UP (ref 0–0)
PHOSPHATE SERPL-MCNC: 2.6 MG/DL — SIGNIFICANT CHANGE UP (ref 2.5–4.5)
PLATELET # BLD AUTO: 412 K/UL — HIGH (ref 150–400)
PMV BLD: 8.8 FL — SIGNIFICANT CHANGE UP (ref 7–13)
POTASSIUM SERPL-MCNC: 3.7 MMOL/L — SIGNIFICANT CHANGE UP (ref 3.5–5.3)
POTASSIUM SERPL-SCNC: 3.7 MMOL/L — SIGNIFICANT CHANGE UP (ref 3.5–5.3)
PROT SERPL-MCNC: 6.1 G/DL — SIGNIFICANT CHANGE UP (ref 6–8.3)
RBC # BLD: 2.63 M/UL — LOW (ref 4.2–5.8)
RBC # FLD: 16.3 % — HIGH (ref 10.3–14.5)
SODIUM SERPL-SCNC: 131 MMOL/L — LOW (ref 135–145)
WBC # BLD: 4.96 K/UL — SIGNIFICANT CHANGE UP (ref 3.8–10.5)
WBC # FLD AUTO: 4.96 K/UL — SIGNIFICANT CHANGE UP (ref 3.8–10.5)

## 2025-06-23 PROCEDURE — 99233 SBSQ HOSP IP/OBS HIGH 50: CPT | Mod: GC

## 2025-06-23 RX ORDER — OXYBUTYNIN CHLORIDE 5 MG/1
1 TABLET, FILM COATED, EXTENDED RELEASE ORAL
Qty: 0 | Refills: 0 | DISCHARGE
Start: 2025-06-23

## 2025-06-23 RX ORDER — HYDROMORPHONE/SOD CHLOR,ISO/PF 2 MG/10 ML
1 SYRINGE (ML) INJECTION
Qty: 0 | Refills: 0 | DISCHARGE
Start: 2025-06-23

## 2025-06-23 RX ORDER — PENICILLIN G POTASSIUM 5000000 [IU]/1
4 INJECTION, POWDER, FOR SOLUTION INTRAMUSCULAR; INTRAVENOUS
Qty: 25 | Refills: 10
Start: 2025-06-23 | End: 2025-10-10

## 2025-06-23 RX ADMIN — OXYBUTYNIN CHLORIDE 5 MILLIGRAM(S): 5 TABLET, FILM COATED, EXTENDED RELEASE ORAL at 08:14

## 2025-06-23 RX ADMIN — CEFEPIME 100 MILLIGRAM(S): 2 INJECTION, POWDER, FOR SOLUTION INTRAVENOUS at 17:35

## 2025-06-23 RX ADMIN — Medication 50 MILLIGRAM(S): at 21:35

## 2025-06-23 RX ADMIN — Medication 2 MILLIGRAM(S): at 10:29

## 2025-06-23 RX ADMIN — CEFEPIME 100 MILLIGRAM(S): 2 INJECTION, POWDER, FOR SOLUTION INTRAVENOUS at 05:27

## 2025-06-23 RX ADMIN — Medication 2000 UNIT(S): at 11:47

## 2025-06-23 RX ADMIN — Medication 1 TABLET(S): at 11:46

## 2025-06-23 RX ADMIN — OXYBUTYNIN CHLORIDE 5 MILLIGRAM(S): 5 TABLET, FILM COATED, EXTENDED RELEASE ORAL at 17:15

## 2025-06-23 RX ADMIN — BICTEGRAVIR SODIUM, EMTRICITABINE, AND TENOFOVIR ALAFENAMIDE FUMARATE 1 TABLET(S): 50; 200; 25 TABLET ORAL at 11:47

## 2025-06-23 RX ADMIN — Medication 5 MILLIGRAM(S): at 05:27

## 2025-06-23 RX ADMIN — Medication 2 MILLIGRAM(S): at 09:29

## 2025-06-23 RX ADMIN — Medication 2 MILLIGRAM(S): at 22:16

## 2025-06-23 RX ADMIN — Medication 5 MILLIGRAM(S): at 00:39

## 2025-06-23 RX ADMIN — Medication 2 MILLIGRAM(S): at 23:10

## 2025-06-23 RX ADMIN — Medication 1 APPLICATION(S): at 11:49

## 2025-06-23 RX ADMIN — OXYBUTYNIN CHLORIDE 5 MILLIGRAM(S): 5 TABLET, FILM COATED, EXTENDED RELEASE ORAL at 00:39

## 2025-06-23 RX ADMIN — EZETIMIBE 10 MILLIGRAM(S): 10 TABLET ORAL at 21:35

## 2025-06-23 RX ADMIN — POLYETHYLENE GLYCOL 3350 17 GRAM(S): 17 POWDER, FOR SOLUTION ORAL at 17:15

## 2025-06-23 RX ADMIN — POLYETHYLENE GLYCOL 3350 17 GRAM(S): 17 POWDER, FOR SOLUTION ORAL at 05:27

## 2025-06-23 RX ADMIN — BUPROPION HYDROBROMIDE 150 MILLIGRAM(S): 522 TABLET, EXTENDED RELEASE ORAL at 11:47

## 2025-06-23 RX ADMIN — OXYBUTYNIN CHLORIDE 5 MILLIGRAM(S): 5 TABLET, FILM COATED, EXTENDED RELEASE ORAL at 21:35

## 2025-06-23 NOTE — DISCHARGE NOTE NURSING/CASE MANAGEMENT/SOCIAL WORK - NSDCCRTYPESERV_GEN_ALL_CORE_FT
IV antibiotics at home. Nurse to visit on day of discharge.  IV antibiotics at home. Nurse to visit on day of discharge 6/24/25 between 5-7pm

## 2025-06-23 NOTE — PROGRESS NOTE ADULT - PROBLEM SELECTOR PLAN 4
On diltiazem 240mg and metoprolol [id number] at home. Follows with cardiology but has no documented tachyarrhythmia history. Not septic during this admission.    - Continue home diltiazem 240 mg  - holding home bblocker iso soft BPs  - inpatient BP goals: SBP <140 (given hematuria)

## 2025-06-23 NOTE — DISCHARGE NOTE NURSING/CASE MANAGEMENT/SOCIAL WORK - FINANCIAL ASSISTANCE
[facility] provides services at a reduced cost to those who are determined to be eligible through [facility]’s financial assistance program. Information regarding [facility]’s financial assistance program can be found by going to [email or web address] or by calling [phone].

## 2025-06-23 NOTE — PROGRESS NOTE ADULT - SUBJECTIVE AND OBJECTIVE BOX
Roman Franz  PGY-2 Resident Physician     Patient is a 59y old  Male who presents with a chief complaint of UTI w/ bilateral nephrostomy tubes (22 Jun 2025 08:33)      SUBJECTIVE / OVERNIGHT EVENTS:  -NAEON    MEDICATIONS  (STANDING):  acetaminophen     Tablet .. 975 milliGRAM(s) Oral every 8 hours  bictegravir 50 mG/emtricitabine 200 mG/tenofovir alafenamide 25 mG (BIKTARVY) 1 Tablet(s) Oral daily  bisacodyl 5 milliGRAM(s) Oral every 12 hours  buPROPion XL (24-Hour) . 150 milliGRAM(s) Oral daily  cefepime   IVPB      cefepime   IVPB 2000 milliGRAM(s) IV Intermittent every 12 hours  chlorhexidine 2% Cloths 1 Application(s) Topical daily  cholecalciferol 2000 Unit(s) Oral daily  ezetimibe 10 milliGRAM(s) Oral at bedtime  multivitamin 1 Tablet(s) Oral daily  oxybutynin 5 milliGRAM(s) Oral every 8 hours  polyethylene glycol 3350 17 Gram(s) Oral two times a day  traZODone 50 milliGRAM(s) Oral at bedtime    MEDICATIONS  (PRN):  HYDROmorphone   Tablet 2 milliGRAM(s) Oral every 6 hours PRN Severe Pain (7 - 10)  zolpidem 5 milliGRAM(s) Oral at bedtime PRN Insomnia    Allergies    No Known Allergies    Intolerances        Vital Signs Last 24 Hrs  T(C): 36.7 (23 Jun 2025 05:16), Max: 36.9 (22 Jun 2025 12:20)  T(F): 98 (23 Jun 2025 05:16), Max: 98.4 (22 Jun 2025 12:20)  HR: 89 (23 Jun 2025 05:16) (89 - 103)  BP: 113/74 (23 Jun 2025 05:16) (113/74 - 116/81)  BP(mean): --  RR: 17 (23 Jun 2025 05:16) (17 - 18)  SpO2: 98% (23 Jun 2025 05:16) (96% - 98%)    Parameters below as of 23 Jun 2025 05:16  Patient On (Oxygen Delivery Method): room air      Daily     Daily   I&O's Summary    22 Jun 2025 07:01  -  23 Jun 2025 07:00  --------------------------------------------------------  IN: 1620 mL / OUT: 1375 mL / NET: 245 mL        Physical Exam  CONSTITUTIONAL: NAD; well-developed  HEENT: PERRL, clear conjunctiva  RESPIRATORY: Normal respiratory effort; lungs are clear to auscultation bilaterally; No Crackles/Rhonchi/Wheezing  CARDIOVASCULAR: Regular rate and rhythm, normal S1 and S2, no murmur/rub/gallop; No lower extremity edema; Peripheral pulses are 2+ bilaterally  ABDOMEN: Nontender to palpation, normoactive bowel sounds, no rebound/guarding; No hepatosplenomegaly, + ostomy  MUSCULOSKELETAL: no clubbing or cyanosis of digits; no joint swelling or tenderness to palpation  EXTREMITY: Lower extremities Non-tender to palpation; non-erythematous B/L, b/l Nephrostomy tubes+  NEURO: A&Ox3; no focal deficits   PSYCH: normal mood; Affect appropirate    DIAGNOSTICS:                         8.6    5.50  )-----------( 424      ( 22 Jun 2025 06:30 )             26.2     Hgb Trend: 8.6<--, 8.5<--, 8.6<--, 7.9<--, 7.8<--  06-22    140  |  105  |  12  ----------------------------<  90  4.1   |  22  |  0.90    Ca    8.7      22 Jun 2025 06:30  Phos  2.7     06-22  Mg     2.10     06-22      CAPILLARY BLOOD GLUCOSE        Creatinine Trend: 0.90<--, 0.79<--, 0.83<--, 0.89<--, 0.94<--, 1.07<--          Urinalysis Basic - ( 22 Jun 2025 06:30 )    Color: x / Appearance: x / SG: x / pH: x  Gluc: 90 mg/dL / Ketone: x  / Bili: x / Urobili: x   Blood: x / Protein: x / Nitrite: x   Leuk Esterase: x / RBC: x / WBC x   Sq Epi: x / Non Sq Epi: x / Bacteria: x           Roman Franz  PGY-2 Resident Physician     Patient is a 59y old  Male who presents with a chief complaint of UTI w/ bilateral nephrostomy tubes (22 Jun 2025 08:33)      SUBJECTIVE / OVERNIGHT EVENTS:  -NAEON. Denies chest pain, SOB, N/V. Has abdominal pain around ostomy improved with PO dilaudid    MEDICATIONS  (STANDING):  acetaminophen     Tablet .. 975 milliGRAM(s) Oral every 8 hours  bictegravir 50 mG/emtricitabine 200 mG/tenofovir alafenamide 25 mG (BIKTARVY) 1 Tablet(s) Oral daily  bisacodyl 5 milliGRAM(s) Oral every 12 hours  buPROPion XL (24-Hour) . 150 milliGRAM(s) Oral daily  cefepime   IVPB      cefepime   IVPB 2000 milliGRAM(s) IV Intermittent every 12 hours  chlorhexidine 2% Cloths 1 Application(s) Topical daily  cholecalciferol 2000 Unit(s) Oral daily  ezetimibe 10 milliGRAM(s) Oral at bedtime  multivitamin 1 Tablet(s) Oral daily  oxybutynin 5 milliGRAM(s) Oral every 8 hours  polyethylene glycol 3350 17 Gram(s) Oral two times a day  traZODone 50 milliGRAM(s) Oral at bedtime    MEDICATIONS  (PRN):  HYDROmorphone   Tablet 2 milliGRAM(s) Oral every 6 hours PRN Severe Pain (7 - 10)  zolpidem 5 milliGRAM(s) Oral at bedtime PRN Insomnia    Allergies    No Known Allergies    Intolerances        Vital Signs Last 24 Hrs  T(C): 36.7 (23 Jun 2025 05:16), Max: 36.9 (22 Jun 2025 12:20)  T(F): 98 (23 Jun 2025 05:16), Max: 98.4 (22 Jun 2025 12:20)  HR: 89 (23 Jun 2025 05:16) (89 - 103)  BP: 113/74 (23 Jun 2025 05:16) (113/74 - 116/81)  BP(mean): --  RR: 17 (23 Jun 2025 05:16) (17 - 18)  SpO2: 98% (23 Jun 2025 05:16) (96% - 98%)    Parameters below as of 23 Jun 2025 05:16  Patient On (Oxygen Delivery Method): room air      Daily     Daily   I&O's Summary    22 Jun 2025 07:01  -  23 Jun 2025 07:00  --------------------------------------------------------  IN: 1620 mL / OUT: 1375 mL / NET: 245 mL        Physical Exam  CONSTITUTIONAL: NAD; well-developed  HEENT: PERRL, clear conjunctiva  RESPIRATORY: Normal respiratory effort; lungs are clear to auscultation bilaterally; No Crackles/Rhonchi/Wheezing  CARDIOVASCULAR: Regular rate and rhythm, normal S1 and S2, no murmur/rub/gallop; No lower extremity edema; Peripheral pulses are 2+ bilaterally  ABDOMEN: Nontender to palpation, normoactive bowel sounds, no rebound/guarding; No hepatosplenomegaly, + ostomy  MUSCULOSKELETAL: no clubbing or cyanosis of digits; no joint swelling or tenderness to palpation  EXTREMITY: Lower extremities Non-tender to palpation; non-erythematous B/L, b/l Nephrostomy tubes+  NEURO: A&Ox3; no focal deficits   PSYCH: normal mood; Affect appropirate    DIAGNOSTICS:                         8.6    5.50  )-----------( 424      ( 22 Jun 2025 06:30 )             26.2     Hgb Trend: 8.6<--, 8.5<--, 8.6<--, 7.9<--, 7.8<--  06-22    140  |  105  |  12  ----------------------------<  90  4.1   |  22  |  0.90    Ca    8.7      22 Jun 2025 06:30  Phos  2.7     06-22  Mg     2.10     06-22      CAPILLARY BLOOD GLUCOSE        Creatinine Trend: 0.90<--, 0.79<--, 0.83<--, 0.89<--, 0.94<--, 1.07<--          Urinalysis Basic - ( 22 Jun 2025 06:30 )    Color: x / Appearance: x / SG: x / pH: x  Gluc: 90 mg/dL / Ketone: x  / Bili: x / Urobili: x   Blood: x / Protein: x / Nitrite: x   Leuk Esterase: x / RBC: x / WBC x   Sq Epi: x / Non Sq Epi: x / Bacteria: x

## 2025-06-23 NOTE — PROGRESS NOTE ADULT - ATTENDING COMMENTS
59 yr old man PMH prostate + anal cancer (s/p chemotherapy + radiation in 2020 c/b bilateral nephrostomy tubes and ostomy), HIV on Biktarvy, HTN, HLD, anxiety/depression p/w bilateral flank pain and hematuria. Course c/b RRT 6/16 due to hemorrhagic shock, s/p PRBC and IVF and MICU course. S/p conversion of PCNU to PCN, cystoscopy, IR angiogram and right NT exchanged 6/19.    # sepsis 2/2 pyelonephritis: urine culture >100K Enterobacter and pseudomonas, Bcx NGTD  # secondary syphilis: prior treatment with IM Bicillin (multiple treatment), recent biopsy last month by dermatology had findings of spirochetes still  # HIV on Biktarvy    Cefepime 2 g q 12 continue through 6/24, then switch to IV PCN 4 million units q 4 through 7/4/25 (to complete 10 days of IV PCN) per ID recs  Pending PICC placement  Flush NT with NS 5cc qd. Will need routine nephrostomy tube exchange every 3 months    DC planning once home ABx set up and PICC placed

## 2025-06-23 NOTE — DISCHARGE NOTE NURSING/CASE MANAGEMENT/SOCIAL WORK - PATIENT PORTAL LINK FT
You can access the FollowMyHealth Patient Portal offered by [facility] by registering at the following website: [email or web address]. By joining [facility]’s FollowMyHealth portal, you will also be able to view your health information using other applications (apps) compatible with our system.

## 2025-06-23 NOTE — DISCHARGE NOTE NURSING/CASE MANAGEMENT/SOCIAL WORK - NSDCFUADDAPPT_GEN_ALL_CORE_FT
APPTS ARE READY TO BE MADE: [x] YES    Best Family or Patient Contact (if needed):    Additional Information about above appointments (if needed):    1: PCP  2: Urology  3: Infectious disease  4: Oncology    Other comments or requests:

## 2025-06-23 NOTE — CHART NOTE - NSCHARTNOTEFT_GEN_A_CORE
PRE-INTERVENTIONAL RADIOLOGY PROCEDURE NOTE  ============================  Roman Franz MD   Internal Medicine, PGY-2  ============================  Patient Name: CARL ROWLEY    Patient Age: 59y    Patient Gender: Male    Procedure: PICC Line    Diagnosis/Indication: Abx    Interventional Radiology Attending Physician:    Ordering Attending Physician:    Pertinent Medical History:    Pertinent labs:                      7.9    4.96  )-----------( 412      ( 23 Jun 2025 05:57 )             24.6       06-23    131[L]  |  104  |  13  ----------------------------<  264[H]  3.7   |  21[L]  |  0.86    Ca    8.3[L]      23 Jun 2025 05:57  Phos  2.6     06-23  Mg     2.10     06-23    TPro  6.1  /  Alb  3.1[L]  /  TBili  <0.2  /  DBili  x   /  AST  13  /  ALT  10  /  AlkPhos  66  06-23              Patient and Family Aware ? Yes

## 2025-06-24 VITALS
TEMPERATURE: 98 F | DIASTOLIC BLOOD PRESSURE: 86 MMHG | RESPIRATION RATE: 17 BRPM | HEART RATE: 91 BPM | OXYGEN SATURATION: 100 % | SYSTOLIC BLOOD PRESSURE: 129 MMHG

## 2025-06-24 LAB
ALBUMIN SERPL ELPH-MCNC: 3.6 G/DL — SIGNIFICANT CHANGE UP (ref 3.3–5)
ALP SERPL-CCNC: 79 U/L — SIGNIFICANT CHANGE UP (ref 40–120)
ALT FLD-CCNC: 12 U/L — SIGNIFICANT CHANGE UP (ref 4–41)
ANION GAP SERPL CALC-SCNC: 10 MMOL/L — SIGNIFICANT CHANGE UP (ref 7–14)
AST SERPL-CCNC: 12 U/L — SIGNIFICANT CHANGE UP (ref 4–40)
BASOPHILS # BLD AUTO: 0.03 K/UL — SIGNIFICANT CHANGE UP (ref 0–0.2)
BASOPHILS NFR BLD AUTO: 0.6 % — SIGNIFICANT CHANGE UP (ref 0–2)
BILIRUB SERPL-MCNC: <0.2 MG/DL — SIGNIFICANT CHANGE UP (ref 0.2–1.2)
BUN SERPL-MCNC: 13 MG/DL — SIGNIFICANT CHANGE UP (ref 7–23)
CALCIUM SERPL-MCNC: 9.5 MG/DL — SIGNIFICANT CHANGE UP (ref 8.4–10.5)
CHLORIDE SERPL-SCNC: 104 MMOL/L — SIGNIFICANT CHANGE UP (ref 98–107)
CO2 SERPL-SCNC: 24 MMOL/L — SIGNIFICANT CHANGE UP (ref 22–31)
CREAT SERPL-MCNC: 0.95 MG/DL — SIGNIFICANT CHANGE UP (ref 0.5–1.3)
EGFR: 93 ML/MIN/1.73M2 — SIGNIFICANT CHANGE UP
EGFR: 93 ML/MIN/1.73M2 — SIGNIFICANT CHANGE UP
EOSINOPHIL # BLD AUTO: 0.04 K/UL — SIGNIFICANT CHANGE UP (ref 0–0.5)
EOSINOPHIL NFR BLD AUTO: 0.7 % — SIGNIFICANT CHANGE UP (ref 0–6)
GLUCOSE SERPL-MCNC: 94 MG/DL — SIGNIFICANT CHANGE UP (ref 70–99)
HCT VFR BLD CALC: 28.5 % — LOW (ref 39–50)
HGB BLD-MCNC: 9.1 G/DL — LOW (ref 13–17)
IMM GRANULOCYTES # BLD AUTO: 0.04 K/UL — SIGNIFICANT CHANGE UP (ref 0–0.07)
IMM GRANULOCYTES NFR BLD AUTO: 0.7 % — SIGNIFICANT CHANGE UP (ref 0–0.9)
LYMPHOCYTES # BLD AUTO: 1.67 K/UL — SIGNIFICANT CHANGE UP (ref 1–3.3)
LYMPHOCYTES NFR BLD AUTO: 30.8 % — SIGNIFICANT CHANGE UP (ref 13–44)
MAGNESIUM SERPL-MCNC: 2 MG/DL — SIGNIFICANT CHANGE UP (ref 1.6–2.6)
MCHC RBC-ENTMCNC: 29.5 PG — SIGNIFICANT CHANGE UP (ref 27–34)
MCHC RBC-ENTMCNC: 31.9 G/DL — LOW (ref 32–36)
MCV RBC AUTO: 92.5 FL — SIGNIFICANT CHANGE UP (ref 80–100)
MONOCYTES # BLD AUTO: 0.68 K/UL — SIGNIFICANT CHANGE UP (ref 0–0.9)
MONOCYTES NFR BLD AUTO: 12.5 % — SIGNIFICANT CHANGE UP (ref 2–14)
NEUTROPHILS # BLD AUTO: 2.97 K/UL — SIGNIFICANT CHANGE UP (ref 1.8–7.4)
NEUTROPHILS NFR BLD AUTO: 54.7 % — SIGNIFICANT CHANGE UP (ref 43–77)
NRBC # BLD AUTO: 0 K/UL — SIGNIFICANT CHANGE UP (ref 0–0)
NRBC # FLD: 0 K/UL — SIGNIFICANT CHANGE UP (ref 0–0)
NRBC BLD AUTO-RTO: 0 /100 WBCS — SIGNIFICANT CHANGE UP (ref 0–0)
PHOSPHATE SERPL-MCNC: 2.9 MG/DL — SIGNIFICANT CHANGE UP (ref 2.5–4.5)
PLATELET # BLD AUTO: 490 K/UL — HIGH (ref 150–400)
PMV BLD: 8.7 FL — SIGNIFICANT CHANGE UP (ref 7–13)
POTASSIUM SERPL-MCNC: 3.9 MMOL/L — SIGNIFICANT CHANGE UP (ref 3.5–5.3)
POTASSIUM SERPL-SCNC: 3.9 MMOL/L — SIGNIFICANT CHANGE UP (ref 3.5–5.3)
PROT SERPL-MCNC: 6.6 G/DL — SIGNIFICANT CHANGE UP (ref 6–8.3)
RBC # BLD: 3.08 M/UL — LOW (ref 4.2–5.8)
RBC # FLD: 15.9 % — HIGH (ref 10.3–14.5)
SODIUM SERPL-SCNC: 138 MMOL/L — SIGNIFICANT CHANGE UP (ref 135–145)
WBC # BLD: 5.43 K/UL — SIGNIFICANT CHANGE UP (ref 3.8–10.5)
WBC # FLD AUTO: 5.43 K/UL — SIGNIFICANT CHANGE UP (ref 3.8–10.5)

## 2025-06-24 PROCEDURE — 36573 INSJ PICC RS&I 5 YR+: CPT

## 2025-06-24 PROCEDURE — 99239 HOSP IP/OBS DSCHRG MGMT >30: CPT | Mod: GC

## 2025-06-24 RX ORDER — PENICILLIN G POTASSIUM 5000000 [IU]/1
4 INJECTION, POWDER, FOR SOLUTION INTRAMUSCULAR; INTRAVENOUS ONCE
Refills: 0 | Status: DISCONTINUED | OUTPATIENT
Start: 2025-06-24 | End: 2025-06-24

## 2025-06-24 RX ORDER — PENICILLIN G POTASSIUM 5000000 [IU]/1
4 INJECTION, POWDER, FOR SOLUTION INTRAMUSCULAR; INTRAVENOUS ONCE
Refills: 0 | Status: COMPLETED | OUTPATIENT
Start: 2025-06-24 | End: 2025-06-24

## 2025-06-24 RX ADMIN — OXYBUTYNIN CHLORIDE 5 MILLIGRAM(S): 5 TABLET, FILM COATED, EXTENDED RELEASE ORAL at 05:47

## 2025-06-24 RX ADMIN — Medication 1 APPLICATION(S): at 13:06

## 2025-06-24 RX ADMIN — PENICILLIN G POTASSIUM 100 MILLION UNIT(S): 5000000 INJECTION, POWDER, FOR SOLUTION INTRAMUSCULAR; INTRAVENOUS at 13:00

## 2025-06-24 RX ADMIN — BICTEGRAVIR SODIUM, EMTRICITABINE, AND TENOFOVIR ALAFENAMIDE FUMARATE 1 TABLET(S): 50; 200; 25 TABLET ORAL at 13:03

## 2025-06-24 RX ADMIN — BUPROPION HYDROBROMIDE 150 MILLIGRAM(S): 522 TABLET, EXTENDED RELEASE ORAL at 13:02

## 2025-06-24 RX ADMIN — CEFEPIME 100 MILLIGRAM(S): 2 INJECTION, POWDER, FOR SOLUTION INTRAVENOUS at 05:51

## 2025-06-24 RX ADMIN — Medication 2000 UNIT(S): at 13:03

## 2025-06-24 RX ADMIN — Medication 1 TABLET(S): at 13:01

## 2025-06-24 RX ADMIN — OXYBUTYNIN CHLORIDE 5 MILLIGRAM(S): 5 TABLET, FILM COATED, EXTENDED RELEASE ORAL at 13:03

## 2025-06-24 NOTE — PROCEDURE NOTE - PROCEDURE FINDINGS AND DETAILS
Left common femoral artery access.  Aortography demonstrates patent iliac vessels with no pseudoaneurysm or active extravasation.   Right renal angiogram with and without nephrostomy in situ demonstrates no pseudoaneurysm or active extravasation.   Right 12Fr nephrostomy exchange.  Mynx closure device.
Successful conversion of nephroureteral tube to a nephrostomy tube. A 12 [place] multipurpose drainage catheter was placed.   Patient developed significant bleeding from tube insertion site while performing exchange. A rapid response was called due to diaphoresis and hypotension to 90/50.   IV fluids and prbc was administered with improvement in BP and HR.   CTA was obtained and reviewed.
Successful placement of 4Fr single-lumen PICC in LUE. Tip of PICC in SVC. Length 46cm.

## 2025-06-24 NOTE — PROGRESS NOTE ADULT - REASON FOR ADMISSION
UTI w/ bilateral nephrostomy tubes

## 2025-06-24 NOTE — PROGRESS NOTE ADULT - TIME BILLING
chart and data review, clinical assessment, and coordination of care. This excludes any time spent on separate procedures or teaching.
Preparing to see the patient including review of tests and other providers' notes, confirming history with patient/family member, performing medical examination and evaluation, counseling and educating the patient/family/caregiver, ordering medications, tests and procedures, communicating with other health care professionals, documenting clinical information in the EMR, independently interpreting results and communicating results to the patient/family/caregiver, care coordination
Time spent includes direct patient care  (interview and examination of patient), discussion with other providers, support staff and/or patient's family members, review of medical records, ordering diagnostic tests and analyzing results, and documentation. Time spent was exclusive of teaching residents.
Time spent includes direct patient care  (interview and examination of patient), discussion with other providers, support staff and/or patient's family members, review of medical records, ordering diagnostic tests and analyzing results, and documentation. Time spent was exclusive of teaching residents.

## 2025-06-24 NOTE — PROCEDURE NOTE - GENERAL PROCEDURE NAME
Aortogram, Right renal angiogram, Right nephrostomy tube exchange
Peripherally Inserted Central Catheter Placement
Right nephroureteral tube to nephrostomy conversion

## 2025-06-24 NOTE — PROGRESS NOTE ADULT - NUTRITIONAL ASSESSMENT
This patient has been assessed with a concern for Malnutrition and has been determined to have a diagnosis/diagnoses of Moderate protein-calorie malnutrition.    This patient is being managed with:   Diet Regular-  Entered: Jun 18 2025  7:30AM  
This patient has been assessed with a concern for Malnutrition and has been determined to have a diagnosis/diagnoses of Moderate protein-calorie malnutrition.    This patient is being managed with:   Diet Regular-  Entered: Jun 18 2025  7:30AM    Diet NPO after Midnight-     NPO Start Date: 18-Jun-2025   NPO Start Time: 23:59  Entered: Jun 18 2025  7:30AM  
This patient has been assessed with a concern for Malnutrition and has been determined to have a diagnosis/diagnoses of Moderate protein-calorie malnutrition.    This patient is being managed with:   Diet Regular-  Entered: Jun 18 2025  7:30AM  
This patient has been assessed with a concern for Malnutrition and has been determined to have a diagnosis/diagnoses of Moderate protein-calorie malnutrition.    This patient is being managed with:   Diet Regular-  Entered: Jun 18 2025  7:30AM    Diet NPO after Midnight-     NPO Start Date: 18-Jun-2025   NPO Start Time: 23:59  Entered: Jun 18 2025  7:30AM  
This patient has been assessed with a concern for Malnutrition and has been determined to have a diagnosis/diagnoses of Moderate protein-calorie malnutrition.    This patient is being managed with:   Diet Regular-  Entered: Jun 18 2025  7:30AM  
This patient has been assessed with a concern for Malnutrition and has been determined to have a diagnosis/diagnoses of Moderate protein-calorie malnutrition.    This patient is being managed with:   Diet Regular-  Entered: Jun 18 2025  7:30AM

## 2025-06-24 NOTE — PROGRESS NOTE ADULT - PROVIDER SPECIALTY LIST ADULT
Infectious Disease
Internal Medicine
Internal Medicine
Intervent Radiology
Intervent Radiology
Urology
Urology
Infectious Disease
Urology
MICU
Urology
Internal Medicine

## 2025-06-24 NOTE — PROGRESS NOTE ADULT - ATTENDING COMMENTS
59 yr old man PMH prostate + anal cancer (s/p chemotherapy + radiation in 2020 c/b bilateral nephrostomy tubes and ostomy), HIV on Biktarvy, HTN, HLD, anxiety/depression p/w bilateral flank pain and hematuria. Course c/b RRT 6/16 due to hemorrhagic shock, s/p PRBC and IVF and MICU course. S/p conversion of PCNU to PCN, cystoscopy, IR angiogram and right NT exchanged 6/19.    # sepsis 2/2 pyelonephritis: urine culture >100K Enterobacter and pseudomonas, Bcx NGTD  # secondary syphilis: prior treatment with IM Bicillin (multiple treatment), recent biopsy last month by dermatology had findings of spirochetes still  # HIV on Biktarvy    Cefepime 2 g q 12 continue through 6/24, then switch to IV PCN 4 million units q 4 through 7/4/25 (to complete 10 days of IV PCN) per ID recs  Pending PICC placement 6/24  Flush NT with NS 5cc qd (patient aware on how to perform this). Will need routine nephrostomy tube exchange every 3 months    DC planning home today after PICC placement and first dose of PCN today.

## 2025-06-24 NOTE — PROGRESS NOTE ADULT - PROBLEM SELECTOR PLAN 4
On diltiazem 240mg and metoprolol [id number] at home. Follows with cardiology but has no documented tachyarrhythmia history. Not septic during this admission.    - Continue home diltiazem 240 mg  - holding home bblocker iso soft BPs  - inpatient BP goals: SBP <140 (given hematuria)

## 2025-06-24 NOTE — PROGRESS NOTE ADULT - SUBJECTIVE AND OBJECTIVE BOX
Roman Franz  PGY-2 Resident Physician     Patient is a 59y old  Male who presents with a chief complaint of UTI w/ bilateral nephrostomy tubes (23 Jun 2025 07:46)      SUBJECTIVE / OVERNIGHT EVENTS:  -NAEON    MEDICATIONS  (STANDING):  acetaminophen     Tablet .. 975 milliGRAM(s) Oral every 8 hours  bictegravir 50 mG/emtricitabine 200 mG/tenofovir alafenamide 25 mG (BIKTARVY) 1 Tablet(s) Oral daily  bisacodyl 5 milliGRAM(s) Oral every 12 hours  buPROPion XL (24-Hour) . 150 milliGRAM(s) Oral daily  cefepime   IVPB      cefepime   IVPB 2000 milliGRAM(s) IV Intermittent every 12 hours  chlorhexidine 2% Cloths 1 Application(s) Topical daily  cholecalciferol 2000 Unit(s) Oral daily  ezetimibe 10 milliGRAM(s) Oral at bedtime  multivitamin 1 Tablet(s) Oral daily  oxybutynin 5 milliGRAM(s) Oral every 8 hours  polyethylene glycol 3350 17 Gram(s) Oral two times a day  traZODone 50 milliGRAM(s) Oral at bedtime    MEDICATIONS  (PRN):  HYDROmorphone   Tablet 2 milliGRAM(s) Oral every 6 hours PRN Severe Pain (7 - 10)  zolpidem 5 milliGRAM(s) Oral at bedtime PRN Insomnia    Allergies    No Known Allergies    Intolerances        Vital Signs Last 24 Hrs  T(C): 37.2 (24 Jun 2025 05:11), Max: 37.2 (24 Jun 2025 05:11)  T(F): 99 (24 Jun 2025 05:11), Max: 99 (24 Jun 2025 05:11)  HR: 91 (24 Jun 2025 05:11) (91 - 96)  BP: 122/89 (24 Jun 2025 05:11) (122/89 - 133/91)  BP(mean): --  RR: 18 (24 Jun 2025 05:11) (16 - 18)  SpO2: 99% (24 Jun 2025 05:11) (99% - 100%)    Parameters below as of 24 Jun 2025 05:11  Patient On (Oxygen Delivery Method): room air      Daily     Daily   I&O's Summary    23 Jun 2025 07:01  -  24 Jun 2025 07:00  --------------------------------------------------------  IN: 1110 mL / OUT: 3250 mL / NET: -2140 mL    24 Jun 2025 07:01  -  24 Jun 2025 09:28  --------------------------------------------------------  IN: 660 mL / OUT: 250 mL / NET: 410 mL        Physical Exam  CONSTITUTIONAL: NAD; well-developed  HEENT: PERRL, clear conjunctiva  RESPIRATORY: Normal respiratory effort; lungs are clear to auscultation bilaterally; No Crackles/Rhonchi/Wheezing  CARDIOVASCULAR: Regular rate and rhythm, normal S1 and S2, no murmur/rub/gallop; No lower extremity edema; Peripheral pulses are 2+ bilaterally  ABDOMEN: Nontender to palpation, normoactive bowel sounds, no rebound/guarding; No hepatosplenomegaly, + ostomy, PCN x 2 (flushed)  MUSCULOSKELETAL: no clubbing or cyanosis of digits; no joint swelling or tenderness to palpation  EXTREMITY: Lower extremities Non-tender to palpation; non-erythematous B/L, b/l Nephrostomy tubes+  NEURO: A&Ox3; no focal deficits   PSYCH: normal mood; Affect appropirate    DIAGNOSTICS:                         9.1    5.43  )-----------( 490      ( 24 Jun 2025 05:20 )             28.5     Hgb Trend: 9.1<--, 7.9<--, 8.6<--, 8.5<--, 8.6<--  06-24    138  |  104  |  13  ----------------------------<  94  3.9   |  24  |  0.95    Ca    9.5      24 Jun 2025 05:20  Phos  2.9     06-24  Mg     2.00     06-24    TPro  6.6  /  Alb  3.6  /  TBili  <0.2  /  DBili  x   /  AST  12  /  ALT  12  /  AlkPhos  79  06-24    CAPILLARY BLOOD GLUCOSE        Creatinine Trend: 0.95<--, 0.86<--, 0.90<--, 0.79<--, 0.83<--, 0.89<--  LIVER FUNCTIONS - ( 24 Jun 2025 05:20 )  Alb: 3.6 g/dL / Pro: 6.6 g/dL / ALK PHOS: 79 U/L / ALT: 12 U/L / AST: 12 U/L / GGT: x                 Urinalysis Basic - ( 24 Jun 2025 05:20 )    Color: x / Appearance: x / SG: x / pH: x  Gluc: 94 mg/dL / Ketone: x  / Bili: x / Urobili: x   Blood: x / Protein: x / Nitrite: x   Leuk Esterase: x / RBC: x / WBC x   Sq Epi: x / Non Sq Epi: x / Bacteria: x           Roman Franz  PGY-2 Resident Physician     Patient is a 59y old  Male who presents with a chief complaint of UTI w/ bilateral nephrostomy tubes (23 Jun 2025 07:46)      SUBJECTIVE / OVERNIGHT EVENTS:  -NAEON, no complaints. Wants to go home today    MEDICATIONS  (STANDING):  acetaminophen     Tablet .. 975 milliGRAM(s) Oral every 8 hours  bictegravir 50 mG/emtricitabine 200 mG/tenofovir alafenamide 25 mG (BIKTARVY) 1 Tablet(s) Oral daily  bisacodyl 5 milliGRAM(s) Oral every 12 hours  buPROPion XL (24-Hour) . 150 milliGRAM(s) Oral daily  cefepime   IVPB      cefepime   IVPB 2000 milliGRAM(s) IV Intermittent every 12 hours  chlorhexidine 2% Cloths 1 Application(s) Topical daily  cholecalciferol 2000 Unit(s) Oral daily  ezetimibe 10 milliGRAM(s) Oral at bedtime  multivitamin 1 Tablet(s) Oral daily  oxybutynin 5 milliGRAM(s) Oral every 8 hours  polyethylene glycol 3350 17 Gram(s) Oral two times a day  traZODone 50 milliGRAM(s) Oral at bedtime    MEDICATIONS  (PRN):  HYDROmorphone   Tablet 2 milliGRAM(s) Oral every 6 hours PRN Severe Pain (7 - 10)  zolpidem 5 milliGRAM(s) Oral at bedtime PRN Insomnia    Allergies    No Known Allergies    Intolerances        Vital Signs Last 24 Hrs  T(C): 37.2 (24 Jun 2025 05:11), Max: 37.2 (24 Jun 2025 05:11)  T(F): 99 (24 Jun 2025 05:11), Max: 99 (24 Jun 2025 05:11)  HR: 91 (24 Jun 2025 05:11) (91 - 96)  BP: 122/89 (24 Jun 2025 05:11) (122/89 - 133/91)  BP(mean): --  RR: 18 (24 Jun 2025 05:11) (16 - 18)  SpO2: 99% (24 Jun 2025 05:11) (99% - 100%)    Parameters below as of 24 Jun 2025 05:11  Patient On (Oxygen Delivery Method): room air      Daily     Daily   I&O's Summary    23 Jun 2025 07:01  -  24 Jun 2025 07:00  --------------------------------------------------------  IN: 1110 mL / OUT: 3250 mL / NET: -2140 mL    24 Jun 2025 07:01  -  24 Jun 2025 09:28  --------------------------------------------------------  IN: 660 mL / OUT: 250 mL / NET: 410 mL        Physical Exam  CONSTITUTIONAL: NAD; well-developed  HEENT: PERRL, clear conjunctiva  RESPIRATORY: Normal respiratory effort; lungs are clear to auscultation bilaterally; No Crackles/Rhonchi/Wheezing  CARDIOVASCULAR: Regular rate and rhythm, normal S1 and S2, no murmur/rub/gallop; No lower extremity edema; Peripheral pulses are 2+ bilaterally  ABDOMEN: Nontender to palpation, normoactive bowel sounds, no rebound/guarding; No hepatosplenomegaly, + ostomy, PCN x 2 (flushed)  MUSCULOSKELETAL: no clubbing or cyanosis of digits; no joint swelling or tenderness to palpation  EXTREMITY: Lower extremities Non-tender to palpation; non-erythematous B/L, b/l Nephrostomy tubes+  NEURO: A&Ox3; no focal deficits   PSYCH: normal mood; Affect appropirate    DIAGNOSTICS:                         9.1    5.43  )-----------( 490      ( 24 Jun 2025 05:20 )             28.5     Hgb Trend: 9.1<--, 7.9<--, 8.6<--, 8.5<--, 8.6<--  06-24    138  |  104  |  13  ----------------------------<  94  3.9   |  24  |  0.95    Ca    9.5      24 Jun 2025 05:20  Phos  2.9     06-24  Mg     2.00     06-24    TPro  6.6  /  Alb  3.6  /  TBili  <0.2  /  DBili  x   /  AST  12  /  ALT  12  /  AlkPhos  79  06-24    CAPILLARY BLOOD GLUCOSE        Creatinine Trend: 0.95<--, 0.86<--, 0.90<--, 0.79<--, 0.83<--, 0.89<--  LIVER FUNCTIONS - ( 24 Jun 2025 05:20 )  Alb: 3.6 g/dL / Pro: 6.6 g/dL / ALK PHOS: 79 U/L / ALT: 12 U/L / AST: 12 U/L / GGT: x                 Urinalysis Basic - ( 24 Jun 2025 05:20 )    Color: x / Appearance: x / SG: x / pH: x  Gluc: 94 mg/dL / Ketone: x  / Bili: x / Urobili: x   Blood: x / Protein: x / Nitrite: x   Leuk Esterase: x / RBC: x / WBC x   Sq Epi: x / Non Sq Epi: x / Bacteria: x

## 2025-06-25 RX ORDER — HYDROMORPHONE/SOD CHLOR,ISO/PF 2 MG/10 ML
1 SYRINGE (ML) INJECTION
Qty: 20 | Refills: 0
Start: 2025-06-25 | End: 2025-06-29

## 2025-06-25 RX ORDER — OXYBUTYNIN CHLORIDE 5 MG/1
1 TABLET, FILM COATED, EXTENDED RELEASE ORAL
Qty: 15 | Refills: 0
Start: 2025-06-25 | End: 2025-06-29

## 2025-06-25 NOTE — CHART NOTE - NSCHARTNOTESELECT_GEN_ALL_CORE
/Event Note
Event Note
IR
IR Preprocedure Note
IR pre procedure note
IRS Site Check
Preprocedure Note
Urology/Off Service Note
iSTOP
Hematuria
IR Pre-Procedure Note/Event Note
IR pre-procedure
MICU Accept Note/Transfer Note
MICU Downgrade/Event Note
POCUS
Palliative Care/Off Service Note
post DC followup/Event Note

## 2025-06-25 NOTE — CHART NOTE - NSCHARTNOTEFT_GEN_A_CORE
Called and spoke to patient and wife. Missing medications oxybutynin and dilaudid PO x 5 days prescribed to preferred pharmacy. Patient also mentioned leaking from nephrostomy tube and reports he has a followup appointment with IR this Fri 6/27 for tube check. He is also advised to followup with PCP, Dr. Schwartz next week. All questions and concerns answered.

## 2025-06-26 ENCOUNTER — RX RENEWAL (OUTPATIENT)
Age: 60
End: 2025-06-26

## 2025-06-26 RX ORDER — POLYETHYLENE GLYCOL 3350 17 G/17G
17 POWDER, FOR SOLUTION ORAL
Qty: 3 | Refills: 0 | Status: ACTIVE | COMMUNITY
Start: 2025-06-26 | End: 1900-01-01

## 2025-06-26 RX ORDER — OXYBUTYNIN CHLORIDE 5 MG/1
5 TABLET ORAL EVERY 8 HOURS
Qty: 90 | Refills: 0 | Status: ACTIVE | COMMUNITY
Start: 2025-06-26 | End: 1900-01-01

## 2025-06-26 RX ORDER — CHOLECALCIFEROL (VITAMIN D3) 50 MCG
50 MCG TABLET ORAL DAILY
Qty: 90 | Refills: 0 | Status: ACTIVE | COMMUNITY
Start: 2025-06-26 | End: 1900-01-01

## 2025-06-26 RX ORDER — SENNOSIDES 8.6 MG/1
8.6 CAPSULE, GELATIN COATED ORAL AT BEDTIME
Qty: 180 | Refills: 0 | Status: ACTIVE | COMMUNITY
Start: 2025-06-26 | End: 1900-01-01

## 2025-06-27 ENCOUNTER — RESULT REVIEW (OUTPATIENT)
Age: 60
End: 2025-06-27

## 2025-06-27 ENCOUNTER — APPOINTMENT (OUTPATIENT)
Dept: INTERVENTIONAL RADIOLOGY/VASCULAR | Facility: HOSPITAL | Age: 60
End: 2025-06-27

## 2025-06-27 ENCOUNTER — TRANSCRIPTION ENCOUNTER (OUTPATIENT)
Age: 60
End: 2025-06-27

## 2025-06-27 ENCOUNTER — OUTPATIENT (OUTPATIENT)
Dept: OUTPATIENT SERVICES | Facility: HOSPITAL | Age: 60
LOS: 1 days | End: 2025-06-27
Payer: MEDICAID

## 2025-06-27 DIAGNOSIS — Z93.3 COLOSTOMY STATUS: Chronic | ICD-10-CM

## 2025-06-27 DIAGNOSIS — Z93.6 OTHER ARTIFICIAL OPENINGS OF URINARY TRACT STATUS: Chronic | ICD-10-CM

## 2025-06-27 DIAGNOSIS — N13.9 OBSTRUCTIVE AND REFLUX UROPATHY, UNSPECIFIED: ICD-10-CM

## 2025-06-27 PROCEDURE — 50387 CHANGE NEPHROURETERAL CATH: CPT | Mod: LT

## 2025-06-27 NOTE — BRIEF OPERATIVE NOTE - NSICDXBRIEFPREOP_GEN_ALL_CORE_FT
PRE-OP DIAGNOSIS:  Anal cancer 27-Jun-2025 10:50:19  Rick Patton  Prostate cancer 27-Jun-2025 10:50:28  Rick Patton

## 2025-06-27 NOTE — ASU DISCHARGE PLAN (ADULT/PEDIATRIC) - ASU DC SPECIAL INSTRUCTIONSFT
Left nephroureterostomy tube exchange    Discharge Instructions  - You have had a drain exchanged.  - Keep the area clean and dry.  - Do not soak in a tub or pool with the drain, however you may shower with the drain and dressing covered in plastic wrap.  - Do not put traction on the drain and be careful that the drain does not get accidentally dislodged or kinked.  - Record output daily from the drain. Empty the bag as needed.  - You may resume your normal diet.  - You may resume your normal medications.  - It is normal to experience some pain over the site for the next few days. You may take apply ice to the area (20 minutes on, 20 minutes off) and take Tylenol for that pain. Do not take more frequently than every 6 hours and do not exceed more than 3000mg of Tylenol in a 24 hour period.    Notify your primary physician and/or Interventional Radiology IMMEDIATELY if you experience any of the following       - Fever of 100.4F  or 38C       - Chills or Rigors/ Shakes       - Swelling and/or Redness in the area of the puncture site       - Worsening Pain       - Blood soaked bandages or worsening bleeding       - Lightheadedness and/or dizziness upon standing       - Chest Pain/ Tightness       - Shortness of Breath       - Difficulty walking    If you have a problem that you believe requires IMMEDIATE attention, please go to your NEAREST Emergency Room. If you believe your problem can safely wait until you speak to a physician, please call Interventional Radiology for any concerns.    During Normal Weekday Business Hours- You can contact the Interventional Radiology department during normal business hours via telephone.

## 2025-06-27 NOTE — ASU DISCHARGE PLAN (ADULT/PEDIATRIC) - FINANCIAL ASSISTANCE
[facility] provides services at a reduced cost to those who are determined to be eligible through [facility]’s financial assistance program. Information regarding [facility]’s financial assistance program can be found by going to [email or web address] or by calling [phone].

## 2025-06-27 NOTE — BRIEF OPERATIVE NOTE - OPERATION/FINDINGS
left nephroureteral catheter exchanged over a wire for a new [employer] 12 fr x 24 cm nephroureteral catheter.

## 2025-06-27 NOTE — BRIEF OPERATIVE NOTE - NSICDXBRIEFPROCEDURE_GEN_ALL_CORE_FT
PROCEDURES:  Percutaneous replacement of nephroureteral catheter with imaging guidance 27-Jun-2025 10:48:01  Rick Patton

## 2025-06-27 NOTE — BRIEF OPERATIVE NOTE - NSICDXBRIEFPOSTOP_GEN_ALL_CORE_FT
POST-OP DIAGNOSIS:  Anal cancer 27-Jun-2025 10:50:40  Rick Patton  Prostate cancer 27-Jun-2025 10:50:49  Rick Patton

## 2025-07-02 NOTE — PROCEDURE
Hospitalist Progress Note      Date of Admission: 6/27/2025  Chief Complaint:    Chief Complaint   Patient presents with    Knee Pain    Urinary Frequency     Subjective:  No new complaints.  No nausea, vomiting, chest pain, or headache      Medications:    Infusion Medications    sodium chloride       Scheduled Medications    oxyBUTYnin  5 mg Oral TID    enoxaparin  30 mg SubCUTAneous BID    ammonium lactate   Topical Daily    furosemide  40 mg Oral Daily    pantoprazole  40 mg Oral QAM AC    diclofenac sodium  2 g Topical BID    sodium chloride flush  5-40 mL IntraVENous 2 times per day    amitriptyline  25 mg Oral Nightly    aspirin  81 mg Oral Daily    carvedilol  12.5 mg Oral BID    LORazepam  0.5 mg Oral Nightly    LORazepam  0.25 mg Oral QAM    magnesium oxide  400 mg Oral Daily    metOLazone  2.5 mg Oral Daily    therapeutic multivitamin-minerals  1 tablet Oral Daily    PARoxetine  20 mg Oral Daily    potassium chloride  100 mEq Oral Daily    tiZANidine  4 mg Oral BID     PRN Meds: sodium chloride flush, sodium chloride, potassium chloride **OR** potassium alternative oral replacement **OR** potassium chloride, magnesium sulfate, ondansetron **OR** ondansetron, melatonin, acetaminophen **OR** acetaminophen, sodium phosphate, analgesic ointment    Intake/Output Summary (Last 24 hours) at 7/2/2025 1854  Last data filed at 7/2/2025 1832  Gross per 24 hour   Intake 1200 ml   Output 3050 ml   Net -1850 ml     Exam:  BP (!) 142/66   Pulse 59   Temp 98.2 °F (36.8 °C) (Oral)   Resp 18   Ht 1.651 m (5' 5\")   Wt 109.6 kg (241 lb 11.2 oz)   SpO2 99%   BMI 40.22 kg/m²   Head: Normocephalic, atraumatic  Sclera clear  Neck JVD flat  Lungs: normal effort of breathing    Labs:   Recent Labs     06/30/25  0558 07/01/25  0446 07/02/25  0604   WBC 10.2 10.4 9.2   HGB 11.2* 10.9* 10.8*   HCT 33.5* 32.8* 34.0*   * 407* 426*     Recent Labs     06/30/25  0558 07/01/25  0446 07/02/25  0604    143 145*   K 3.9   [Outpatient] : Outpatient [Ambulatory] : Patient is ambulatory. [THIS CHAMBER HAS BEEN CLEANED / DISINFECTED] : This chamber has been cleaned / disinfected according to local and hospital policy and procedure prior to this treatment. [____] : Post-Dive: Time - [unfilled] [___] : Post-Dive: Value - [unfilled] mg/dL [I have examined and evaluated this patient today, including ears and lungs and have cleared the patient] : I have examined and evaluated this patient today, including ears and lungs and have cleared the patient to continue HBOT as prescribed.  [Time MD/Provider assessed Patient: _____] : Time MD/Provider assessed Patient: [unfilled] [I have ordered 1 HBOT session at the indicated protocol as seen below] : I have ordered 1 HBOT session at the indicated protocol as seen below [Patient demonstrated and verbalized proper technique for using air break mask] : Patient demonstrated and verbalized proper technique for using air break mask [Patient educated on the risks of SMOKING prior to HBOT with understanding] : Patient educated on the risks of SMOKING prior to HBOT with understanding [Patient educated on the risks of CONSUMING ALCOHOL prior to HBOT with understanding] : Patient educated on the risks of CONSUMING ALCOHOL prior to HBOT with understanding [100% Cotton] : 100% cotton [Empty all pockets] : empty all pockets [No hair oils, wigs, hairpieces, pins] : no hair oils, wigs, hairpieces, pins  [Pre tx medications] : pre tx medications  [No make-up, creams] : no make-up, creams  [No jewelry] : no jewelry  [Hearing aid removed] : hearing aid removed [No matches, cigarettes, lighters] : no matches, cigarettes, lighters  [Dentures removed] : dentures removed [Ground bracelet on pt's wrist] : ground bracelet on pt's wrist  [Contacts removed] : contacts removed  [Remove nail polish] : remove nail polish  [No reading material] : no reading material  [Bra, undergarments removed] : bra, undergarments removed  [No contraindicated dressings] : no contraindicated dressings [Ground Wire - VISUAL Verification - Intact/Free of Obstruction] : Ground Wire - VISUAL Verification - Intact/Free of Obstruction  [Ground Continuity - Verified < 1 ohm w/ Wrist Strap Ernesto] : Ground Continuity - Verified < 1 ohm w/ Wrist Strap Ernesto [Diagnosis: ___] : Diagnosis: [unfilled] [Number: ___] : Number: [unfilled] [] : No [Clear all fields] : clear all fields [FreeTextEntry1] : 2.0 [FreeTextEntry3] : 90 mins [[facility] CentertEntry5] : 4282 [[facility]] : 6909 [FreeTextEntry9] : 3457 [de-identified] : 110  Mins/4 units [de-identified] : 5043

## 2025-07-03 ENCOUNTER — APPOINTMENT (OUTPATIENT)
Dept: INTERNAL MEDICINE | Facility: CLINIC | Age: 60
End: 2025-07-03
Payer: MEDICAID

## 2025-07-03 VITALS
SYSTOLIC BLOOD PRESSURE: 120 MMHG | BODY MASS INDEX: 25.06 KG/M2 | OXYGEN SATURATION: 95 % | WEIGHT: 179 LBS | HEIGHT: 71 IN | TEMPERATURE: 97.9 F | HEART RATE: 87 BPM | DIASTOLIC BLOOD PRESSURE: 82 MMHG

## 2025-07-03 PROBLEM — R11.0 NAUSEA: Status: ACTIVE | Noted: 2025-07-03

## 2025-07-03 PROBLEM — R26.81 UNSTEADY GAIT: Status: ACTIVE | Noted: 2025-07-03

## 2025-07-03 PROBLEM — Z09 HOSPITAL DISCHARGE FOLLOW-UP: Status: RESOLVED | Noted: 2023-06-07 | Resolved: 2025-07-03

## 2025-07-03 PROCEDURE — 99213 OFFICE O/P EST LOW 20 MIN: CPT

## 2025-07-03 PROCEDURE — 99496 TRANSJ CARE MGMT HIGH F2F 7D: CPT

## 2025-07-03 RX ORDER — ONDANSETRON 4 MG/1
4 TABLET ORAL EVERY 8 HOURS
Qty: 30 | Refills: 0 | Status: ACTIVE | COMMUNITY
Start: 2025-07-03 | End: 1900-01-01

## 2025-07-03 RX ORDER — HYDROMORPHONE HYDROCHLORIDE 4 MG/1
4 TABLET ORAL TWICE DAILY
Qty: 20 | Refills: 0 | Status: ACTIVE | COMMUNITY
Start: 2025-07-03 | End: 1900-01-01

## 2025-07-07 ENCOUNTER — APPOINTMENT (OUTPATIENT)
Dept: CARDIOLOGY | Facility: CLINIC | Age: 60
End: 2025-07-07
Payer: MEDICAID

## 2025-07-07 ENCOUNTER — RX RENEWAL (OUTPATIENT)
Age: 60
End: 2025-07-07

## 2025-07-07 VITALS
OXYGEN SATURATION: 96 % | HEIGHT: 71 IN | DIASTOLIC BLOOD PRESSURE: 60 MMHG | HEART RATE: 77 BPM | SYSTOLIC BLOOD PRESSURE: 90 MMHG | WEIGHT: 179 LBS | BODY MASS INDEX: 25.06 KG/M2

## 2025-07-07 PROBLEM — R07.89 ATYPICAL CHEST PAIN: Status: ACTIVE | Noted: 2025-07-07

## 2025-07-07 PROCEDURE — 99214 OFFICE O/P EST MOD 30 MIN: CPT | Mod: 25

## 2025-07-07 PROCEDURE — 93000 ELECTROCARDIOGRAM COMPLETE: CPT

## 2025-07-08 ENCOUNTER — INPATIENT (INPATIENT)
Facility: HOSPITAL | Age: 60
LOS: 2 days | Discharge: HOME | End: 2025-07-11
Attending: HOSPITALIST | Admitting: HOSPITALIST
Payer: MEDICAID

## 2025-07-08 VITALS
OXYGEN SATURATION: 96 % | HEART RATE: 75 BPM | WEIGHT: 179.02 LBS | TEMPERATURE: 98 F | HEIGHT: 71 IN | RESPIRATION RATE: 18 BRPM | SYSTOLIC BLOOD PRESSURE: 116 MMHG | DIASTOLIC BLOOD PRESSURE: 76 MMHG

## 2025-07-08 DIAGNOSIS — Z93.6 OTHER ARTIFICIAL OPENINGS OF URINARY TRACT STATUS: Chronic | ICD-10-CM

## 2025-07-08 DIAGNOSIS — R31.9 HEMATURIA, UNSPECIFIED: ICD-10-CM

## 2025-07-08 DIAGNOSIS — E78.5 HYPERLIPIDEMIA, UNSPECIFIED: ICD-10-CM

## 2025-07-08 DIAGNOSIS — I10 ESSENTIAL (PRIMARY) HYPERTENSION: ICD-10-CM

## 2025-07-08 DIAGNOSIS — R13.10 DYSPHAGIA, UNSPECIFIED: ICD-10-CM

## 2025-07-08 DIAGNOSIS — N39.0 URINARY TRACT INFECTION, SITE NOT SPECIFIED: ICD-10-CM

## 2025-07-08 DIAGNOSIS — Z93.3 COLOSTOMY STATUS: Chronic | ICD-10-CM

## 2025-07-08 DIAGNOSIS — B20 HUMAN IMMUNODEFICIENCY VIRUS [HIV] DISEASE: ICD-10-CM

## 2025-07-08 LAB
ADD ON TEST-SPECIMEN IN LAB: SIGNIFICANT CHANGE UP
ALBUMIN SERPL ELPH-MCNC: 3.9 G/DL — SIGNIFICANT CHANGE UP (ref 3.3–5)
ALP SERPL-CCNC: 91 U/L — SIGNIFICANT CHANGE UP (ref 40–120)
ALT FLD-CCNC: 13 U/L — SIGNIFICANT CHANGE UP (ref 4–41)
ANION GAP SERPL CALC-SCNC: 10 MMOL/L — SIGNIFICANT CHANGE UP (ref 7–14)
APPEARANCE UR: ABNORMAL
APTT BLD: 28.7 SEC — SIGNIFICANT CHANGE UP (ref 26.1–36.8)
AST SERPL-CCNC: 32 U/L — SIGNIFICANT CHANGE UP (ref 4–40)
BASOPHILS # BLD AUTO: 0.03 K/UL — SIGNIFICANT CHANGE UP (ref 0–0.2)
BASOPHILS NFR BLD AUTO: 0.4 % — SIGNIFICANT CHANGE UP (ref 0–2)
BILIRUB SERPL-MCNC: 0.2 MG/DL — SIGNIFICANT CHANGE UP (ref 0.2–1.2)
BILIRUB UR-MCNC: ABNORMAL
BLOOD GAS VENOUS COMPREHENSIVE RESULT: SIGNIFICANT CHANGE UP
BUN SERPL-MCNC: 11 MG/DL — SIGNIFICANT CHANGE UP (ref 7–23)
CALCIUM SERPL-MCNC: 9.3 MG/DL — SIGNIFICANT CHANGE UP (ref 8.4–10.5)
CHLORIDE SERPL-SCNC: 102 MMOL/L — SIGNIFICANT CHANGE UP (ref 98–107)
CO2 SERPL-SCNC: 25 MMOL/L — SIGNIFICANT CHANGE UP (ref 22–31)
COLOR SPEC: ABNORMAL
CREAT SERPL-MCNC: 0.89 MG/DL — SIGNIFICANT CHANGE UP (ref 0.5–1.3)
DIFF PNL FLD: ABNORMAL
EGFR: 99 ML/MIN/1.73M2 — SIGNIFICANT CHANGE UP
EGFR: 99 ML/MIN/1.73M2 — SIGNIFICANT CHANGE UP
EOSINOPHIL # BLD AUTO: 0.06 K/UL — SIGNIFICANT CHANGE UP (ref 0–0.5)
EOSINOPHIL NFR BLD AUTO: 0.9 % — SIGNIFICANT CHANGE UP (ref 0–6)
GLUCOSE SERPL-MCNC: 91 MG/DL — SIGNIFICANT CHANGE UP (ref 70–99)
GLUCOSE UR QL: NEGATIVE MG/DL — SIGNIFICANT CHANGE UP
HCT VFR BLD CALC: 32.5 % — LOW (ref 39–50)
HGB BLD-MCNC: 10 G/DL — LOW (ref 13–17)
IMM GRANULOCYTES # BLD AUTO: 0.04 K/UL — SIGNIFICANT CHANGE UP (ref 0–0.07)
IMM GRANULOCYTES NFR BLD AUTO: 0.6 % — SIGNIFICANT CHANGE UP (ref 0–0.9)
INR BLD: 1.08 RATIO — SIGNIFICANT CHANGE UP (ref 0.85–1.16)
KETONES UR QL: ABNORMAL MG/DL
LACTATE SERPL-SCNC: 1.3 MMOL/L — SIGNIFICANT CHANGE UP (ref 0.5–2)
LEUKOCYTE ESTERASE UR-ACNC: ABNORMAL
LYMPHOCYTES # BLD AUTO: 2.24 K/UL — SIGNIFICANT CHANGE UP (ref 1–3.3)
LYMPHOCYTES NFR BLD AUTO: 32 % — SIGNIFICANT CHANGE UP (ref 13–44)
MCHC RBC-ENTMCNC: 29 PG — SIGNIFICANT CHANGE UP (ref 27–34)
MCHC RBC-ENTMCNC: 30.8 G/DL — LOW (ref 32–36)
MCV RBC AUTO: 94.2 FL — SIGNIFICANT CHANGE UP (ref 80–100)
MONOCYTES # BLD AUTO: 0.67 K/UL — SIGNIFICANT CHANGE UP (ref 0–0.9)
MONOCYTES NFR BLD AUTO: 9.6 % — SIGNIFICANT CHANGE UP (ref 2–14)
NEUTROPHILS # BLD AUTO: 3.95 K/UL — SIGNIFICANT CHANGE UP (ref 1.8–7.4)
NEUTROPHILS NFR BLD AUTO: 56.5 % — SIGNIFICANT CHANGE UP (ref 43–77)
NITRITE UR-MCNC: POSITIVE
NRBC # BLD AUTO: 0 K/UL — SIGNIFICANT CHANGE UP (ref 0–0)
NRBC # FLD: 0 K/UL — SIGNIFICANT CHANGE UP (ref 0–0)
NRBC BLD AUTO-RTO: 0 /100 WBCS — SIGNIFICANT CHANGE UP (ref 0–0)
PH UR: 8 — SIGNIFICANT CHANGE UP (ref 5–8)
PLATELET # BLD AUTO: 329 K/UL — SIGNIFICANT CHANGE UP (ref 150–400)
PMV BLD: 9.5 FL — SIGNIFICANT CHANGE UP (ref 7–13)
POTASSIUM SERPL-MCNC: 5 MMOL/L — SIGNIFICANT CHANGE UP (ref 3.5–5.3)
POTASSIUM SERPL-SCNC: 5 MMOL/L — SIGNIFICANT CHANGE UP (ref 3.5–5.3)
PROT SERPL-MCNC: 7.1 G/DL — SIGNIFICANT CHANGE UP (ref 6–8.3)
PROT UR-MCNC: 100 MG/DL
PROTHROM AB SERPL-ACNC: 12.5 SEC — SIGNIFICANT CHANGE UP (ref 9.9–13.4)
RBC # BLD: 3.45 M/UL — LOW (ref 4.2–5.8)
RBC # FLD: 16.4 % — HIGH (ref 10.3–14.5)
SODIUM SERPL-SCNC: 137 MMOL/L — SIGNIFICANT CHANGE UP (ref 135–145)
SP GR SPEC: 1.01 — SIGNIFICANT CHANGE UP (ref 1–1.03)
TROPONIN T, HIGH SENSITIVITY RESULT: 9 NG/L — SIGNIFICANT CHANGE UP
UROBILINOGEN FLD QL: 0.2 MG/DL — SIGNIFICANT CHANGE UP (ref 0.2–1)
WBC # BLD: 6.99 K/UL — SIGNIFICANT CHANGE UP (ref 3.8–10.5)
WBC # FLD AUTO: 6.99 K/UL — SIGNIFICANT CHANGE UP (ref 3.8–10.5)

## 2025-07-08 PROCEDURE — 99232 SBSQ HOSP IP/OBS MODERATE 35: CPT

## 2025-07-08 PROCEDURE — 99223 1ST HOSP IP/OBS HIGH 75: CPT | Mod: 25

## 2025-07-08 PROCEDURE — 71045 X-RAY EXAM CHEST 1 VIEW: CPT | Mod: 26

## 2025-07-08 PROCEDURE — 74174 CTA ABD&PLVS W/CONTRAST: CPT | Mod: 26

## 2025-07-08 PROCEDURE — 99497 ADVNCD CARE PLAN 30 MIN: CPT | Mod: 25

## 2025-07-08 PROCEDURE — 99285 EMERGENCY DEPT VISIT HI MDM: CPT

## 2025-07-08 RX ORDER — ONDANSETRON HCL/PF 4 MG/2 ML
4 VIAL (ML) INJECTION EVERY 8 HOURS
Refills: 0 | Status: DISCONTINUED | OUTPATIENT
Start: 2025-07-08 | End: 2025-07-11

## 2025-07-08 RX ORDER — SENNA 187 MG
2 TABLET ORAL AT BEDTIME
Refills: 0 | Status: DISCONTINUED | OUTPATIENT
Start: 2025-07-08 | End: 2025-07-11

## 2025-07-08 RX ORDER — BICTEGRAVIR SODIUM, EMTRICITABINE, AND TENOFOVIR ALAFENAMIDE FUMARATE 50; 200; 25 MG/1; MG/1; MG/1
1 TABLET ORAL DAILY
Refills: 0 | Status: DISCONTINUED | OUTPATIENT
Start: 2025-07-08 | End: 2025-07-11

## 2025-07-08 RX ORDER — MAGNESIUM, ALUMINUM HYDROXIDE 200-200 MG
30 TABLET,CHEWABLE ORAL EVERY 4 HOURS
Refills: 0 | Status: DISCONTINUED | OUTPATIENT
Start: 2025-07-08 | End: 2025-07-09

## 2025-07-08 RX ORDER — TRAZODONE HCL 100 MG
50 TABLET ORAL AT BEDTIME
Refills: 0 | Status: DISCONTINUED | OUTPATIENT
Start: 2025-07-08 | End: 2025-07-11

## 2025-07-08 RX ORDER — OXYBUTYNIN CHLORIDE 5 MG/1
5 TABLET, FILM COATED, EXTENDED RELEASE ORAL EVERY 8 HOURS
Refills: 0 | Status: DISCONTINUED | OUTPATIENT
Start: 2025-07-08 | End: 2025-07-11

## 2025-07-08 RX ORDER — CLONAZEPAM 0.5 MG/1
1 TABLET ORAL
Refills: 0 | Status: DISCONTINUED | OUTPATIENT
Start: 2025-07-08 | End: 2025-07-11

## 2025-07-08 RX ORDER — BUPROPION HYDROBROMIDE 522 MG/1
150 TABLET, EXTENDED RELEASE ORAL DAILY
Refills: 0 | Status: DISCONTINUED | OUTPATIENT
Start: 2025-07-08 | End: 2025-07-11

## 2025-07-08 RX ORDER — POLYETHYLENE GLYCOL 3350 17 G/17G
17 POWDER, FOR SOLUTION ORAL DAILY
Refills: 0 | Status: DISCONTINUED | OUTPATIENT
Start: 2025-07-08 | End: 2025-07-11

## 2025-07-08 RX ORDER — ACETAMINOPHEN 500 MG/5ML
650 LIQUID (ML) ORAL EVERY 6 HOURS
Refills: 0 | Status: DISCONTINUED | OUTPATIENT
Start: 2025-07-08 | End: 2025-07-11

## 2025-07-08 RX ORDER — EZETIMIBE 10 MG/1
10 TABLET ORAL AT BEDTIME
Refills: 0 | Status: DISCONTINUED | OUTPATIENT
Start: 2025-07-08 | End: 2025-07-11

## 2025-07-08 RX ORDER — PIPERACILLIN-TAZO-DEXTROSE,ISO 3.375G/5
3.38 IV SOLUTION, PIGGYBACK PREMIX FROZEN(ML) INTRAVENOUS EVERY 8 HOURS
Refills: 0 | Status: DISCONTINUED | OUTPATIENT
Start: 2025-07-09 | End: 2025-07-10

## 2025-07-08 RX ORDER — PIPERACILLIN-TAZO-DEXTROSE,ISO 3.375G/5
3.38 IV SOLUTION, PIGGYBACK PREMIX FROZEN(ML) INTRAVENOUS ONCE
Refills: 0 | Status: COMPLETED | OUTPATIENT
Start: 2025-07-08 | End: 2025-07-08

## 2025-07-08 RX ORDER — MELATONIN 5 MG
3 TABLET ORAL AT BEDTIME
Refills: 0 | Status: DISCONTINUED | OUTPATIENT
Start: 2025-07-08 | End: 2025-07-11

## 2025-07-08 RX ADMIN — Medication 200 GRAM(S): at 20:09

## 2025-07-08 RX ADMIN — Medication 4 MILLIGRAM(S): at 11:33

## 2025-07-08 RX ADMIN — Medication 5 MILLIGRAM(S): at 23:02

## 2025-07-08 RX ADMIN — Medication 4 MILLIGRAM(S): at 12:30

## 2025-07-08 RX ADMIN — Medication 50 MILLIGRAM(S): at 23:02

## 2025-07-08 RX ADMIN — Medication 1000 MILLILITER(S): at 10:38

## 2025-07-08 RX ADMIN — Medication 650 MILLIGRAM(S): at 20:11

## 2025-07-08 RX ADMIN — OXYBUTYNIN CHLORIDE 5 MILLIGRAM(S): 5 TABLET, FILM COATED, EXTENDED RELEASE ORAL at 23:02

## 2025-07-08 RX ADMIN — Medication 4 MILLIGRAM(S): at 15:39

## 2025-07-08 RX ADMIN — Medication 75 MILLILITER(S): at 20:09

## 2025-07-08 RX ADMIN — Medication 25 GRAM(S): at 23:59

## 2025-07-08 RX ADMIN — Medication 1000 MILLILITER(S): at 10:25

## 2025-07-08 RX ADMIN — Medication 4 MILLIGRAM(S): at 14:28

## 2025-07-08 NOTE — H&P ADULT - CONVERSATION DETAILS
Pt would like to remain FULL CODE.  If required, the pt would like the following interventions: CPR and/or intubation with mechanical ventilation.        If he is unable to make his own medical decisions he would like his wife  to do so.
1.52

## 2025-07-08 NOTE — CONSULT NOTE ADULT - ASSESSMENT
59 year old Male w/ PMHx of prostate + anal cancer (s/p chemotherapy + radiation in 2020 c/b bilateral nephrostomy tubes and ostomy), HIV on Biktarvy, HTN, HLD, anxiety/depression presenting with bloody output from right nephrostomy tube. Patient reports the bloody output started 2 days ago. Patient underwent recent renal angiogram, aortogram on 6/19 which was negative. CT angio was obtained today and reviewed demonstrating no demonstrated no pseudoaneurysm or active extravasation.     Plan:  - Case reviewed with Dr. Pagan  - CT angio from today demonstrating no active extravasation or pseudoaneurysm  - Would monitor output for now. The blood tinged output should clear within a few days  - Continue to flush with 5ccs of normal saline BID  - Can reconsult IR prn    [id number] IR PA
59M with PMHx of HIV on Biktarvy, HTN, prostate CA, anal cancer s/p chemo/radiation c/b radiation cystitis and b/l ureteral strictures initially managed with b/l PCNs, followed by b/l ureteral stents, replaced with b/l PCNs, which were then converted to b/l PCNUs. Had recent admission during which R PCNU was converted to R PCN, but had hemorrhage and hypotension during conversion c/f pseudoaneurysm vs. uretero-iliac fistula, although no active bleed seen on CTA. S/p cysto, clot evac that also did not show any obvious bleeding from bladder. Pt presents to ED with hematuria from R PCN and pain at R PCN site.     Recommendations:   - No acute urologic surgical intervention indicated at this time   - On exam, R PCN appears to be dislodged; CTA with likely blood clot in R renal pelvis   - Recommend IR consult for tube check/repositioning of R PCN   - F/u Ucx and blood cx     Discussed w/ attending on-call, Dr. Crawford.  [facility]  [address] [place] [address], [address]  [place] [address] [phone]

## 2025-07-08 NOTE — ED PROVIDER NOTE - PROGRESS NOTE DETAILS
CT shows: IMPRESSION:  *  Right nephrostomy catheter and left nephroureteral catheter in place.  *  Lobulated hyperdensity within and expanding the right renal collecting   system extending to the ureteropelvic junction without clear evidence for   enhancement, suggestive of blood clot. Advise continued follow-up to   ensure resolution and to exclude underlying neoplasm.  *  Mild diffuse urothelial thickening of the right ureter, which can be   seen with inflammation or infection. Correlate with urinalysis.      IR was paged and did see the patient.

## 2025-07-08 NOTE — H&P ADULT - NSHPPHYSICALEXAM_GEN_ALL_CORE
PHYSICAL EXAM:  GENERAL: NAD, comfortable at bedside   HEAD:  Atraumatic, Normocephalic  EYES: EOMI, PERRL, conjunctiva and sclera clear  NECK: Supple, No JVD  CHEST/LUNG: Clear to auscultation bilaterally; No wheezes, rales or rhonchi  HEART: Regular rate and rhythm; No murmurs, rubs, or gallops, (+)S1, S2  ABDOMEN: Soft, mid/right sided abdominal tenderness, right flank tenderness, no rebound tenderness, Nondistended; Normal Bowel sounds; stomy in place   : hematuria from right nephrostomy   EXTREMITIES:  2+ Peripheral Pulses, No clubbing, cyanosis, or edema  PSYCH: normal mood and affect  NEUROLOGY: AAOx3, moving all extremities spontaneously  SKIN: No rashes or lesions

## 2025-07-08 NOTE — ED ADULT NURSE REASSESSMENT NOTE - NS ED NURSE REASSESS COMMENT FT1
Dr Jones informed by documenting RN regarding SEPSIS work up for this patient, fluids started, antibiotics not yet started, Dr Jones wanted to wait till the blood test results comes back before administration of abx, documenting RN reminded provider of the SEPSIS bundle.

## 2025-07-08 NOTE — ED PROVIDER NOTE - OBJECTIVE STATEMENT
This is a 59-year-old male with a past medical history of HIV on Biktarvy, hypertension, prostate cancer, anal cancer status post chemotherapy and radiation complicated by bilateral nephrostomy tubes secondary to urological damage and an ostomy tube also recent history of a hernia repair with mesh placement in May recently discharged from the hospital on June 25.  Patient states that he had a left nephrostomy tube exchange when he was admitted here he was also discharged with a PICC line and IV antibiotics for syphilis.  Patient states that his urine was pink turning to yellow when he was discharged and was yellow for a period of time while he was at home however for the past 2 days he started having dark red urine in the right nephrostomy tube.  He denies having any nausea or vomiting admits to having a fever a few days prior of 101.  He admits to having some mild chills as well.  He denies having any shortness of breath however admits to having some discomfort in his chest and abdomen especially when swallowing.  He also admits to having pain along the ostomy placement and his right flank area.  He states that the right flank area is new however the ostomy discomfort has been since he had the surgery for the mesh.  He denies having any headaches dizziness nausea vomiting diarrhea or constipation.  Patient states that he recently had the PICC line taken out to days prior.

## 2025-07-08 NOTE — ED PROVIDER NOTE - CLINICAL SUMMARY MEDICAL DECISION MAKING FREE TEXT BOX
This is a 59-year-old male with a past medical history of HIV on Biktarvy, hypertension, prostate cancer, anal cancer status post chemotherapy and radiation complicated by bilateral nephrostomy tubes secondary to urological damage and an ostomy tube also recent history of a hernia repair with mesh placement in May recently discharged from the hospital on June 25.  Patient states that he had a left nephrostomy tube exchange when he was admitted here he was also discharged with a PICC line and IV antibiotics for syphilis. Patient has gross hematuria, will do labs, urinalysis, cta abdomen.

## 2025-07-08 NOTE — H&P ADULT - NSHPLABSRESULTS_GEN_ALL_CORE
10.0   6.99  )-----------( 329      ( 2025 09:33 )             32.5     137  |  102  |  11  ----------------------------<  91     07-08  5.0   |  25  |  0.89    Ca    9.3      2025 09:33    TPro  7.1  /  Alb  3.9  /  TBili  0.2  /  DBili  x   /  AST  32  /  ALT  13  /  AlkPhos  91  07-08    PT/INR: 12.5/1.08 (25 @ 09:33)  PTT: 28.7 (25 @ 09:33)      09:33 - VBG - pH: 7.35  | pCO2: 53    | pO2: 33    | Lactate: 1.4        Procalcitonin - 0.02 (25 @ 09:33)      hs Troponin, T - 9 ng/L (25 @ 09:33)              Urinalysis Basic - ( 2025 10:41 )  Color: Red / Appearance: Turbid / S.010 / pH: 8.0  Gluc: Negative mg/dL / Ketone: x  / Bili: Small / Urobili: 0.2 mg/dL   Blood: Moderate / Protein: 100 mg/dL / Nitrite: Positive   Leuk Esterase: Large / RBC: 5424 /HPF /  /HPF   Sq Epi: 3 /HPF / Bacteria: Few /HPF  Hyaline Casts: None Seen/WBC Casts: x          Urinalysis with Rflx Culture (collected 2025 10:41)    EKG interpreted by myself:, grossly nonischemic   CXR interpreted by myself: no focal consolidation       CT abdo/pelvis interpreted by radiology: * Right nephrostomy catheter and left nephroureteral catheter in place.  * Lobulated hyperdensity within and expanding the right renal collecting system extending to the ureteropelvic junction without clear evidence for enhancement, suggestive of blood clot. Advise continued follow-up to ensure resolution and to exclude underlying neoplasm.  * Mild diffuse urothelial thickening of the right ureter, which can be seen with inflammation or infection. Correlate with urinalysis.

## 2025-07-08 NOTE — ED PROVIDER NOTE - ABDOMINAL EXAM
tenderness around the flank area, no eecchymosis noted, dark hematuria noted in the right nephrostomy tube.  tenderness around the ostomy bag , no guarding or rigidity, abdomen mildly distended./soft/tender...

## 2025-07-08 NOTE — H&P ADULT - PROBLEM SELECTOR PLAN 1
New  UA: LE: large, Nitrite +, , RBC 5424, Bacteria: few  Per wife sample taken from nephrostomy bag not tube-   CT abdo/pelvis: as above   Give pt hx of Ucx with start/continue with Zosyn   Consider ID consult pending cultures   Urology consulted: "No acute urologic surgical intervention indicated at this time "

## 2025-07-08 NOTE — H&P ADULT - PROBLEM SELECTOR PLAN 3
Unstable as presented with BP 78/60  s/p NS 2L-> continue with NS 100ml/hr for 1L  Hold home metoprolol XL 100mg daily and diltiazem 240mg daily   Monitor and restart as appropriate New  Pt reports intermittent dysphagia  NPO pending speech evaluation

## 2025-07-08 NOTE — ED ADULT NURSE REASSESSMENT NOTE - NS ED NURSE REASSESS COMMENT FT1
Right nephrostomy tube drained , hematuria noted, sample sent to the lab, left nephrostomy tube draining to mohan urine. Blood pressure on the right arm is 78/60, and on the left arm its 107/79, Dr Jones informed IVf infusing well, patient on semi fowlers position, complaining of dizziness. vital sign monitored. .

## 2025-07-08 NOTE — H&P ADULT - PROBLEM SELECTOR PLAN 5
Chronic stable  Continue Biktarvy Chronic stable  Continue zetia 10mg nightly   A1c and lipid panel in AM

## 2025-07-08 NOTE — H&P ADULT - NSHPADDITIONALINFOADULT_GEN_ALL_CORE
medication list obtained from wife at bedside medication list obtained from wife at bedside    istop Reference #: [id number]    C	Y	O	07/03/2025	07/03/2025	hydromorphone 4 mg tablet	20	10	Jarad Schwartz	[id number]	Medicaid	[id number]  C	Y		04/24/2025	06/25/2025	zolpidem tartrate 10 mg tablet	30	30	Marii Becerra	[id number]	Medicaid	[id number]  C	Y	B	06/05/2025	06/25/2025	clonazepam 1 mg tablet	120	30	Marii Becerra	[id number]	Medicaid	[id number]  A	N		04/01/2025	05/31/2025	vireo whole flower 0.0 mg - 1mg thc:<0.5mg cbd/inh	1	24	Marty Garcia	[id number]	[facility]  A	N		04/01/2025	05/31/2025	vireo whole flower 0.0 mg - 1mg thc:<0.5mg cbd/inh	1	24	Marty Garcia	[id number]	[facility]  C	N		04/24/2025	05/26/2025	zolpidem tartrate 10 mg tablet	30	30	Marii Becerra	[id number]	Medicaid	[id number]  C	N	B	05/22/2025	05/26/2025	clonazepam 1 mg tablet	120	30	Marii Becerra	[id number]	Medicaid	[id number]

## 2025-07-08 NOTE — ED ADULT TRIAGE NOTE - CHIEF COMPLAINT QUOTE
Pt c/o bleeding/clots noted in the right nephrostomy drainage bag/tube since Saturday with diffuse lower abdominal pain. Pt wife states she had drained 2 full nephrostomy collection bags of blood. pt states yesterday he had an episode of hypotension at the cardiologist office.  Pt endorses chest pain x yesterday. Pt had replacement of bilateral nephrostomy tube on June 10th. Pt hx HTN, HIV, HLD, bilateral nephrostomy tube, and colostomy bag.

## 2025-07-08 NOTE — ED PROVIDER NOTE - GENITOURINARY BLADDER
right flank pain noted. right nephrostomy draining with hematuria, left nephrostomy tube with clear yellow urine,/non-tender

## 2025-07-08 NOTE — H&P ADULT - PROBLEM SELECTOR PLAN 2
New  H/H stable   Ct abdo/pelvis as above  Urology consulted: "No acute urologic surgical intervention indicated at this time - On exam, R PCN appears to be dislodged; CTA with likely blood clot in R renal pelvis - Recommend IR consult for tube check/repositioning of R PCN - F/u Ucx and blood cx "  IR consulted: "- CT angio from today demonstrating no active extravasation or pseudoaneurysm- Would monitor output for now. The blood tinged output should clear within a few days- Continue to flush with 5ccs of normal saline BID- Can reconsult IR prn"  Continue oxybutynin 5mg Q8 hrs

## 2025-07-08 NOTE — ED ADULT TRIAGE NOTE - BMI (KG/M2)
25 Area H Indication Text: Tumors in this location are included in Area H (eyelids, eyebrows, nose, lips, chin, ear, pre-auricular, post-auricular, temple, genitalia, hands, feet, ankles and areola).  Tissue conservation is critical in these anatomic locations.

## 2025-07-08 NOTE — ED ADULT NURSE REASSESSMENT NOTE - NS ED NURSE REASSESS COMMENT FT1
IV on right hand removed, due to infiltration.  Pain reassessed from 9/10 now its 2/10, patient appears more comfortable.

## 2025-07-08 NOTE — ED PROVIDER NOTE - ATTENDING CONTRIBUTION TO CARE
Patient is a 59-year-old male with a history of HIV on Biktarvy, hypertension, prostate CA, anal CA s/p chemo and radiation c/b  bilateral nephrostomy tubes secondary to urological damage and an ostomy tube also recent history of a hernia repair with mesh placement in May, s/p recent admission 6/10 - 6/24 for hematuria, now here for blood from right nephrostomy tube. During recent admission, patient had an IR exchange nephroureterostomy tubes with standard nephrostomy tubes to allow bladder to tamponade. At IR procedure for conversion of PCNUs to PCNs pt became hypotensive and tachycardic with c/f pseudoaneurysm so transferred to MICU for further monitoring. While in MICU, did not require pressors and underwent cystoscopy, dilation, clot evacuation in the OR 6/17 with urology. Patient underwent Aortogram, right renal angiogram, and right nephrostomy tube exchange though no evidence of pseudoaneurysm so hematuria attributed to infection vs irritation from tubes. Sahu catheter removed.  Of note, patient recently had PICC line and IV antibiotics for secondary syphilis.  Patient reports that hematuria from right nephrostomy tube started 3 days ago.  He reports pain at the same time.  Patient also notes a fever of 100-101,  3 days ago.  He denies any nausea or vomiting.  Patient reports he has chronic pain in his abdomen at the area of the ostomy site.      VS noted  Gen. no acute distress, Non toxic   HEENT: EOMI, mmm  Lungs: CTAB/L no C/ W /R   CVS: RRR   Abd; Soft non tender, non distended, bilateral nephrostomy tubes in place, right nephrostomy bag with dark red urine, CVA tenderness present on right  Ext: no edema  Skin: no rash  Neuro AAOx3 non focal clear speech  a/p:  hematuria–patient had CTA during last ED visit in June that was concerning for a ureteral arterial fistula.  Plan for labs, CT imaging, urology consult.      6/16/25  CTA IMPRESSION:  1.  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  2.  Right distal ureter appears to be obstructed with blood clot.   Additional hematoma in the right proximal urinary collecting system and   urinary bladder. No evidence of active extravasation.    - Clovis BARGER

## 2025-07-08 NOTE — H&P ADULT - PROBLEM SELECTOR PLAN 4
Chronic stable  Continue zetia 10mg nightly   A1c and lipid panel in AM Unstable as presented with BP 78/60  s/p NS 2L-> continue with NS 100ml/hr for 1L  Hold home metoprolol XL 100mg daily and diltiazem 240mg daily   Monitor and restart as appropriate

## 2025-07-08 NOTE — ED ADULT NURSE NOTE - OBJECTIVE STATEMENT
This is a 58 y/o male alert and oriented x 4, with a medical history of HTN, HLD, HIV. Presented today with pain on his abdominal area 5/10, constant, bleeding on his nephrostomy tube on the right flank area. Colostomy tube noted on LLQ, pain around this area 5/10, abdomen is soft but tender on palpation. Pallor noted, air entry is clear bilaterally. Breathing is even and unlabored. IV initiated on left hand g 20 blood work sent to the lab. Waiting for test results. Bed in lowest position, side rails up for safety. Family at bedside. This is a 58 y/o male alert and oriented x 4, with a medical history of HTN, HLD, HIV. Presented today with pain on his abdominal area 5/10, constant, bleeding on his nephrostomy tube on the right flank area. Colostomy tube noted on LLQ, pain around this area 5/10, abdomen is soft but tender on palpation. Pallor noted, air entry is clear bilaterally. Breathing is even and unlabored Complaining of intermittent nausea and vomiting.  IV initiated on left hand g 20 blood work sent to the lab. Waiting for test results. Bed in lowest position, side rails up for safety. Family at bedside.

## 2025-07-08 NOTE — H&P ADULT - PARTICIPANTS
Patient Drysol Counseling:  I discussed with the patient the risks of drysol/aluminum chloride including but not limited to skin rash, itching, irritation, burning.

## 2025-07-08 NOTE — ED ADULT NURSE REASSESSMENT NOTE - NS ED NURSE REASSESS COMMENT FT1
Patient complaining of pain on his right flank area, 10/10, due meds given. Inserted a second line on the right hand g20, patient reports discomfort after the CT Scan. Redraw blood typing done, lab phoned and reports blood was clotted. Redraw done and sent to lab.

## 2025-07-08 NOTE — H&P ADULT - NSHPREVIEWOFSYSTEMS_GEN_ALL_CORE
REVIEW OF SYSTEMS:    CONSTITUTIONAL: No weakness, fevers or chills + lightheadedness   EYES/ENT: No visual changes;  No dysphagia; No sore throat; No rhinorrhea; No sinus pain/pressure  NECK: No pain or stiffness  RESPIRATORY: No cough, wheezing, hemoptysis; No shortness of breath  CARDIOVASCULAR: No chest pain or palpitations; No lower extremity edema  GASTROINTESTINAL: + abdominal  pain. No nausea, vomiting, or hematemesis; No diarrhea or constipation. No melena or hematochezia.  GENITOURINARY: No dysuria, frequency + hematuria  NEUROLOGICAL: No numbness or weakness  MSK: ambulates without assistance   SKIN: No itching, burning, rashes, or lesions   All other review of systems is negative unless indicated above.

## 2025-07-08 NOTE — H&P ADULT - NSICDXPASTMEDICALHX_GEN_ALL_CORE_FT
PAST MEDICAL HISTORY:  Anxiety     History of anal cancer     History of prostate cancer     HIV infection     HLD (hyperlipidemia)     HTN (hypertension)     Parastomal hernia with obstruction and without gangrene     Secondary syphilis

## 2025-07-08 NOTE — ED ADULT NURSE NOTE - DOES PATIENT HAVE ADVANCE DIRECTIVE
3/11/2025    Linwood Morales        [address] [facility] [address]            Dear Linwood Morales,      Our records indicate that you are due for an appointment for a Colonoscopy with Miguel Vázquez MD. Our doctors are booking out about 3-6 months in advance for procedures.     Please call our office to schedule this appointment.  Your medical well-being is important to us.    If your insurance requires a referral, please call your primary care office to request one.      Thank you,      The Physicians and Staff at [facility]        
No

## 2025-07-08 NOTE — ED ADULT NURSE NOTE - NSFALLRISKINTERV_ED_ALL_ED

## 2025-07-08 NOTE — ED ADULT NURSE NOTE - CAS EDN DISCHARGE ASSESSMENT
Patient interviewed and examined.  .  HPI:     ED Course as of 06/23/25 1111   Mon Jun 23, 2025   0602 Pt is a 66 y/o F w/ PMH of HTN, HLD, CADY, DM, hypothyroid, colon CA, Metastatic non-small cell lung cancer s/o Lobectomy on Chemo (monthly, last infusion 2 weeks ago) follows w/ Dr Campbell, COPD, prior intra-abdominal surgical history including gastric bypass, tubal ligation presenting to ED via triage for evaluation of low back pain, abdominal pain, nausea and 2 episodes of NBNB emesis since this morning.  Patient reports similar symptoms approximately a month ago that required IV pain medication in the ED with negative workup.  States that she has been doing well for the last few weeks until last night when she started having symptoms.  Patient attempted her dose of Oxy and Xanax last night with minimal improvement in her symptoms.  Reports 9 out of 10 pain.  Denies any dysuria, urgency or frequency.  Denies any diarrhea.  Reports normal bowel movement yesterday morning.  Denies any fevers, chills, cough, sputum production, chest pain, dyspnea, recent travel, sick contacts.  - Vitals in the ED with bradycardia without fever, tachypnea, hypoxia.  Stable blood pressure.  - Exam showing a WN, WD F, well-appearing, conversational, pleasant in NAD. Awake, Alert and oriented x3. No facial droop. No drift. Strength and sensation intact and equal bilat. Atraumatic head/neck/trunk. PERRL. EOMI. MMM. No meningismus. No midline TTP of cervical,thoracic,lumbar spine. No resp distress. Clear lungs. Bradycardic. Abdomen soft, ND, with mild diffuse TTP without rebound or guarding, no mass.  Back appears grossly normal without any abrasion, contusion, rash.  No CVA TTP. Stable pelvis. Moving all 4 extremities. No deformities, TTP. No leg swelling or calf TTP. Radial/DP/Pt pulses 2+ Bilat.   - Ddx - cholecystitis, cholelithiasis, hepatitis, appendicitis, perforated viscous, diverticulitis, pancreatitis, nephrolithiasis,  pyleonephritis, metabolic imbalance, toxic ingestion, AAA etc.  - Cont pulse ox/tele  - EKG, labs, CT  - Zofran/fluids/Morphine [AP]   0622 EKG-sinus bradycardia 52 bpm without AKIL, TWI.  Abnormal rate, normal rhythm, no ectopy [AP]   0627 No loss of bladder or bowel control.  No red flag findings.  No saddle anesthesia.  Not consistent with cord compression [AP]   0652 Repeat exam reassuring. States morphine isn't helping quite as fast as last time she was in the ED a month ago. Tolerated PO contrast.   Vitals + exam remains reassuring.  [AP]   0712 CBC reassuring with normal WBC, hemoglobin within normal platelet count  CMP with reassuring renal function, slight hyponatremia 134 otherwise reassuring electrolytes and LFTs.  Troponin negative  Magnesium normal  Lipase negative [AP]   0712 Lactic + CT pending [AP]   0829 Repeat exam reassuring.   CT pending.  [AP]   0832 CT A/P  IMPRESSION:  1.   Dilated gallbladder with multiple stones, including a 1 cm stone at  the cystic duct. No fat stranding. If there is clinical concern for acute  cholecystitis, consider ultrasound or HIDA scan.  2.   Chronic dilation of the common bile duct up to 11 mm, probably  physiologic. However, if there is clinical evidence of biliary obstruction  consider MRCP.  3.   Stable 8 mm cystic lesion in the pancreatic tail. Recommend follow-up  MRI/MRCP in 2 years.  4.   Resolved right lower lobe pneumonia. Stable 1 cm right middle lobe  nodule. As discussed on prior, consider tissue sampling or FDG PET/CT to  rule out malignancy.  5.   Nonobstructing left renal calculi.      [AP]   0833 Repeat exam is reassuring.  Patient with improvement in her pain on reassessment on exam there is some focal right upper quadrant tenderness to palpation the patient is mildly tender diffusely.  Agreeable with an ultrasound for further evaluation [AP]   0957 RUQ US  IMPRESSION:  1.   Cholelithiasis without sonographic evidence of acute cholecystitis.  2.    Dilated extrahepatic biliary tree. Correlate with bilirubin and  alkaline phosphatase levels and consider MRCP if indicated given the  possibility of nonvisualized distal common duct stone.  3.   Mild hepatomegaly with diffuse fatty infiltration of the liver.   [AP]   0959 On reassessment patient continues to report nausea, mild diffuse abdominal pain.  Patient is seen Dr Talley for GI, Dr Fernando on call, on page for consult.  [AP]   1017 Dicussed w/ GI MD - will see as consult. Will need MRCP.  [AP]   1101 Discussed w/ BP NP for hosptailzation [AP]      ED Course User Index  [AP] Griffin Thomas MD        PAST MEDICAL HX:    Hypothyroidism                                                HLD (hyperlipidemia)                                          HTN (hypertension)                                            Allergic rhinitis due to pollen                               Major depressive disorder, recurrent, in full *               Colon polyps                                                  COPD (chronic obstructive pulmonary disease)  *               Malignant neoplasm of upper lobe, left bronchu*                 Comment: NSCLC    Malignant neoplasm  (CMD)                                       Comment: colon    CADY (obstructive sleep apnea)                                   Comment: does not use c pap    DM2 (diabetes mellitus, type 2)  (CMD)                          Comment: controlled with diet    Colon cancer  (CMD)                                           Requires supplemental oxygen                                    Comment: 2 L on NOC    Bronchitis, acute                                             High cholesterol                                              Pneumonia                                                     Alcohol abuse                                                 Patient Active Problem List   Diagnosis    Non-small cell lung cancer metastatic to lymph node of head and neck region (CMD)    COPD  (chronic obstructive pulmonary disease)  (CMD)    Seasonal allergic rhinitis due to pollen    Type 2 diabetes mellitus without complication, without long-term current use of insulin (CMD)    Shortness of breath    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)    Hypercholesterolemia    Pain due to neoplasm    Hypothyroid    Insomnia    History of partial surgical removal of colon    Hypertension    Episodic mood disorder (CMD)    Overlapping malignant neoplasm of colon  (CMD)    Malignant neoplasm of lung  (CMD)    Bariatric surgery status    Immunodeficiency, unspecified (CMD)    Type 2 diabetes mellitus with morbid obesity (CMD)    Axillary adenopathy    Choledocholithiasis       PAST SURGICAL HX:    GASTRIC BYPASS                                  2008    LUNG LOBECTOMY                                  2009      Comment: LULI    CARPAL TUNNEL RELEASE                                         HB DELIVERY C SECTION                                         COLONOSCOPY                                     2010          TUBAL LIGATION                                                COLONOSCOPY W/ POLYPECTOMY                                    BRONCHOSCOPY                                                  PAST SURGICAL HISTORY                           2024      Comment: Right axillary lymphadenectomy    Family History   Problem Relation Age of Onset    Cancer Mother     Dementia/Alzheimers Father     Cancer Other         sibling    Cancer Maternal Aunt      Review of patient's family status indicates:    Mother                                           Father                                           Other                                                    Maternal Aunt                                      Social History     Tobacco Use    Smoking status: Former     Current packs/day: 0.00     Average packs/day: 2.0 packs/day for 25.0 years (50.0 ttl pk-yrs)     Types:  Cigarettes     Start date:      Quit date:      Years since quittin.4    Smokeless tobacco: Never   Vaping Use    Vaping status: never used   Substance Use Topics    Alcohol use: Never     Comment: alcoholic sober 20+years    Drug use: Never        PE    Vitals:    25 0800 25 0900 25 0910 25 1045   BP: (!) 157/67 (!) 154/74 (!) 156/73 (!) 150/67   BP Location:    RUE - Right upper extremity   Patient Position:    Semi-Cope's   Pulse: 64 62 62 65   Resp: 17   17   Temp:       TempSrc:       SpO2: 95% 95% 98% 94%   Weight:       Height:           General: Pt is a WN, WD F, well-appearing, conversational, pleasant in NAD.  Skin: Warm and dry, no rash  HEENT: NC/AT, PERRL, EOMI, nares patent, oropharynx clear with moist oral mucosa  Neck: Supple, no JVD, no meningismus, No midline step-offs or TTP of cervical spine  Lungs: CTA bilaterally with normal effort  Heart: Bradycardic, pulses intact and equal throughout  Abd: Abdomen soft, ND, with mild diffuse TTP without rebound or guarding, no mass.  Back appears grossly normal without any abrasion, contusion, rash.  No CVA TTP.   Ext:  Moves all extremities equally, full ROM all joints.  No clubbing, cyanosis, edema, tenderness or deformity.  Neuro:  Awake and alert, GCS 15.  CN II-XII grossly intact.  No gross deficits in strength or sensation throughout.      The case was reviewed with the patient. Nursing notes reviewed.    Results for orders placed or performed during the hospital encounter of 25   Comprehensive Metabolic Panel    Specimen: Blood, Venous   Result Value    Fasting Status     Sodium 134 (L)    Potassium 5.0     Comment: Slight hemolysis, result may be falsely increased.      Chloride 100    Carbon Dioxide 25    Anion Gap 14    Glucose 236 (H)    BUN 13    Creatinine 0.80    Glomerular Filtration Rate 81     Comment: eGFR results = or >60 mL/min/1.73m2 = Normal kidney function. Estimated GFR calculated using the  CKD-EPI-R (2021) equation that does not include race in the creatinine calculation.    BUN/Cr 16    Calcium 8.7    Bilirubin, Total 0.6    GOT/AST 28    GPT/ALT 41    Alkaline Phosphatase 151 (H)    Albumin 3.8    Protein, Total 7.1    Globulin 3.3    A/G Ratio 1.2   Lactic Acid Venous With Reflex    Specimen: Blood, Venous   Result Value    Lactate, Venous 2.4 (HH)   Lipase    Specimen: Blood, Venous   Result Value    Lipase 55   TROPONIN I, HIGH SENSITIVITY    Specimen: Blood, Venous   Result Value    Troponin I, High Sensitivity 6   CBC with Automated Differential (performable only)    Specimen: Blood, Venous   Result Value    WBC 7.2    RBC 4.61    HGB 13.0    HCT 41.9    MCV 90.9    MCH 28.2    MCHC 31.0 (L)    RDW-CV 14.0    RDW-SD 46.3        NRBC 0    Neutrophil, Percent 79    Lymphocytes, Percent 12    Mono, Percent 6    Eosinophils, Percent 1    Basophils, Percent 1    Immature Granulocytes 1    Absolute Neutrophils 5.8    Absolute Lymphocytes 0.8 (L)    Absolute Monocytes 0.5    Absolute Eosinophils  0.1    Absolute Basophils 0.0    Absolute Immature Granulocytes 0.1   Magnesium    Specimen: Blood, Venous   Result Value    Magnesium 1.8   Lactic Acid, Venous    Specimen: Blood, Venous   Result Value    Lactate, Venous 2.5 (HH)        Imaging Results              US LIVER GALLBLADDER PANCREAS (RUQ) (Final result)  Result time 06/23/25 09:47:35      Final result                   Impression:    1.   Cholelithiasis without sonographic evidence of acute cholecystitis.  2.   Dilated extrahepatic biliary tree. Correlate with bilirubin and  alkaline phosphatase levels and consider MRCP if indicated given the  possibility of nonvisualized distal common duct stone.  3.   Mild hepatomegaly with diffuse fatty infiltration of the liver.        Electronically Signed by: MAGAN VASQUEZ M.D.   Signed on: 6/23/2025 9:47 AM   Workstation ID: [id number]               Narrative:    EXAM:   US LIVER GALLBLADDER  PANCREAS (RUQ)    CLINICAL INDICATION: Abdominal pain with abnormal CT of the gallbladder    COMPARISON: CT abdomen 6/23/2025    TECHNIQUE: Transabdominal sonography of the upper abdomen with attention of  the right upper quadrant.    FINDINGS: The pancreatic body and tail are obscured by bowel gas but the  imaged portion of the gland is normal in morphology and echotexture. The  main portal vein is patent with hepatopetal flow.    Liver size is slightly increased. Echotexture is normal but echogenicity is  diffusely increased. There is dilation of the extrahepatic common bile duct  in the kanika hepatis although the distalmost duct is obscured by bowel gas  and cannot be well evaluated. Multiple gallstones layer in the gallbladder  lumen. No gallbladder wall thickening or pericholecystic fluid. Absent  sonographic Craft sign per ultrasound technologist.    Survey images of the right kidney demonstrate normal renal position without  hydronephrosis.                                       CT ABDOMEN PELVIS W CONTRAST (Final result)  Result time 06/23/25 08:27:38      Final result                   Impression:    1.   Dilated gallbladder with multiple stones, including a 1 cm stone at  the cystic duct. No fat stranding. If there is clinical concern for acute  cholecystitis, consider ultrasound or HIDA scan.  2.   Chronic dilation of the common bile duct up to 11 mm, probably  physiologic. However, if there is clinical evidence of biliary obstruction  consider MRCP.  3.   Stable 8 mm cystic lesion in the pancreatic tail. Recommend follow-up  MRI/MRCP in 2 years.  4.   Resolved right lower lobe pneumonia. Stable 1 cm right middle lobe  nodule. As discussed on prior, consider tissue sampling or FDG PET/CT to  rule out malignancy.  5.   Nonobstructing left renal calculi.        Electronically Signed by: ALINE GARCIA M.D.   Signed on: 6/23/2025 8:27 AM   Workstation ID: [id number]               Narrative:    EXAM:  CT ABDOMEN PELVIS W CONTRAST    CLINICAL INDICATION: Abdominal pain/nausea/vomiting    COMPARISON: CT chest/abdomen/pelvis 5/30/2025    TECHNIQUE: Helical CT was performed of the abdomen and pelvis with IV  contrast, with axial, coronal, and sagittal reconstructions performed and  provided for review. Automated exposure control was utilized.    FINDINGS:    LOWER CHEST: Heart is normal in size. Lung bases are overall clear, with  stable 1 cm nodule in the periphery of the right middle lobe with adjacent  atelectasis (series 2 image 5) and resolved right lower lobe pneumonia.    LIVER: Normal.    BILIARY TREE AND GALLBLADDER: Dilated gallbladder to 4.5 cm without fat  stranding. Multiple noncalcified stones in the gallbladder, including 1  measuring 1 cm at the cystic duct (series 2 image 59). Dilated common bile  duct up to 11 mm is chronic.    PANCREAS: 8 mm cystic appearing lesion in the pancreatic tail is stable  (series 2 image 53).    SPLEEN: Normal.    ADRENAL GLANDS: Normal.    KIDNEYS AND URETERS: Couple of punctate left renal calculi, no  hydronephrosis or other acute finding.    BLADDER: Normal.    REPRODUCTIVE ORGANS: Uterus is present with at least one fibroid. Simple  appearing left adnexal cyst less than 3 cm, not requiring follow-up.    GI TRACT: No bowel obstruction or acute inflammation. Normal appendix.  Changes of Rosa M-en-Y bariatric surgery.     PERITONEUM AND RETROPERITONEUM: No free air or pathologic free fluid.    VESSELS: Moderate atherosclerosis.    LYMPH NODES: No lymphadenopathy.    BODY WALL: No concerning finding.    BONES: Normal.                                          Medications   sodium chloride 0.9 % injection 10 mL (has no administration in time range)   sodium chloride 0.9 % injection 2 mL (2 mLs Intracatheter Given 6/23/25 0932)   morphine injection 2 mg (has no administration in time range)   iohexol ORAL (OMNIPAQUE 350) oral contrast solution 12.5 mL (12.5 mLs Oral Given  6/23/25 0636)   ondansetron (ZOFRAN) injection 4 mg (4 mg Intravenous Given 6/23/25 0631)   morphine injection 4 mg (4 mg Intravenous Given 6/23/25 0631)   sodium chloride (NORMAL SALINE) 0.9 % bolus 500 mL (0 mLs Intravenous Completed 6/23/25 0712)   fentaNYL (SUBLIMAZE) injection 50 mcg (50 mcg Intravenous Given 6/23/25 0741)   iohexol (OMNIPAQUE 350 INJECT) contrast solution 80 mL (80 mLs Intravenous Given 6/23/25 0733)           I reviewed prior records and care everywhere - See ED course  Independent history obtained from   I have independently reviewed: EKG, Labs    Procedures     ED Diagnoses       Diagnosis Comment Associated Orders       Final diagnoses    Generalized abdominal pain -- --    Gallstones -- --    Choledocholithiasis -- --             Disposition: Admitted to Griffin Cerna MD  06/23/25 1111     Alert and oriented to person, place and time

## 2025-07-08 NOTE — PATIENT PROFILE ADULT - FALL HARM RISK - HARM RISK INTERVENTIONS

## 2025-07-08 NOTE — H&P ADULT - HISTORY OF PRESENT ILLNESS
59 yr old male with a pmh of HTN, HLD, HIV on Biktarvy, prostate cancer, anal cancer status post chemotherapy and radiation complicated by bilateral nephrostomy tubes secondary to urological damage and an ostomy, hernia repair with mesh placement in May recently discharged from the hospital on June 25 and completed penicillin G for secondary syphilis this past week.. Detailed Urological interventions/complications as per Urology note. Pt presents with sudden onset abdominal pain and right flank pain that is sharp, constant and 10/10. He also reports hematuria from the right nephrostomy that started around the same time. He is endorsing a feeling as if something is stuck in his esophagus when he eats and "feels it going down". Reports lightheadedness with ambulation.   Wife also mentions that his blood pressure has been on the lower end lately.   Denies  headache, dizziness, chest pain, palpitations, SOB,  joint pain, diarrhea/constipation/  Vitals: T 98.7, HR 67, BP 78/60-> 113/73, RR 16 satting 96% RA

## 2025-07-09 LAB
ANION GAP SERPL CALC-SCNC: 11 MMOL/L — SIGNIFICANT CHANGE UP (ref 7–14)
BASOPHILS # BLD AUTO: 0.03 K/UL — SIGNIFICANT CHANGE UP (ref 0–0.2)
BASOPHILS NFR BLD AUTO: 0.8 % — SIGNIFICANT CHANGE UP (ref 0–2)
BUN SERPL-MCNC: 9 MG/DL — SIGNIFICANT CHANGE UP (ref 7–23)
CALCIUM SERPL-MCNC: 8.1 MG/DL — LOW (ref 8.4–10.5)
CHLORIDE SERPL-SCNC: 109 MMOL/L — HIGH (ref 98–107)
CHOLEST SERPL-MCNC: 115 MG/DL — SIGNIFICANT CHANGE UP
CO2 SERPL-SCNC: 22 MMOL/L — SIGNIFICANT CHANGE UP (ref 22–31)
CREAT SERPL-MCNC: 0.85 MG/DL — SIGNIFICANT CHANGE UP (ref 0.5–1.3)
EGFR: 101 ML/MIN/1.73M2 — SIGNIFICANT CHANGE UP
EGFR: 101 ML/MIN/1.73M2 — SIGNIFICANT CHANGE UP
EOSINOPHIL # BLD AUTO: 0.05 K/UL — SIGNIFICANT CHANGE UP (ref 0–0.5)
EOSINOPHIL NFR BLD AUTO: 1.3 % — SIGNIFICANT CHANGE UP (ref 0–6)
GLUCOSE SERPL-MCNC: 107 MG/DL — HIGH (ref 70–99)
HCT VFR BLD CALC: 23.3 % — LOW (ref 39–50)
HCT VFR BLD CALC: 28.9 % — LOW (ref 39–50)
HDLC SERPL-MCNC: 22 MG/DL — LOW
HGB BLD-MCNC: 7.4 G/DL — LOW (ref 13–17)
HGB BLD-MCNC: 9.2 G/DL — LOW (ref 13–17)
IMM GRANULOCYTES # BLD AUTO: 0.03 K/UL — SIGNIFICANT CHANGE UP (ref 0–0.07)
IMM GRANULOCYTES NFR BLD AUTO: 0.8 % — SIGNIFICANT CHANGE UP (ref 0–0.9)
LDLC SERPL-MCNC: 72 MG/DL — SIGNIFICANT CHANGE UP
LIPID PNL WITH DIRECT LDL SERPL: 72 MG/DL — SIGNIFICANT CHANGE UP
LYMPHOCYTES # BLD AUTO: 1.43 K/UL — SIGNIFICANT CHANGE UP (ref 1–3.3)
LYMPHOCYTES NFR BLD AUTO: 36.2 % — SIGNIFICANT CHANGE UP (ref 13–44)
MAGNESIUM SERPL-MCNC: 2 MG/DL — SIGNIFICANT CHANGE UP (ref 1.6–2.6)
MCHC RBC-ENTMCNC: 29.5 PG — SIGNIFICANT CHANGE UP (ref 27–34)
MCHC RBC-ENTMCNC: 29.6 PG — SIGNIFICANT CHANGE UP (ref 27–34)
MCHC RBC-ENTMCNC: 31.8 G/DL — LOW (ref 32–36)
MCHC RBC-ENTMCNC: 31.8 G/DL — LOW (ref 32–36)
MCV RBC AUTO: 92.6 FL — SIGNIFICANT CHANGE UP (ref 80–100)
MCV RBC AUTO: 93.2 FL — SIGNIFICANT CHANGE UP (ref 80–100)
MONOCYTES # BLD AUTO: 0.45 K/UL — SIGNIFICANT CHANGE UP (ref 0–0.9)
MONOCYTES NFR BLD AUTO: 11.4 % — SIGNIFICANT CHANGE UP (ref 2–14)
NEUTROPHILS # BLD AUTO: 1.96 K/UL — SIGNIFICANT CHANGE UP (ref 1.8–7.4)
NEUTROPHILS NFR BLD AUTO: 49.5 % — SIGNIFICANT CHANGE UP (ref 43–77)
NONHDLC SERPL-MCNC: 93 MG/DL — SIGNIFICANT CHANGE UP
NRBC # BLD AUTO: 0 K/UL — SIGNIFICANT CHANGE UP (ref 0–0)
NRBC # BLD AUTO: 0 K/UL — SIGNIFICANT CHANGE UP (ref 0–0)
NRBC # FLD: 0 K/UL — SIGNIFICANT CHANGE UP (ref 0–0)
NRBC # FLD: 0 K/UL — SIGNIFICANT CHANGE UP (ref 0–0)
NRBC BLD AUTO-RTO: 0 /100 WBCS — SIGNIFICANT CHANGE UP (ref 0–0)
NRBC BLD AUTO-RTO: 0 /100 WBCS — SIGNIFICANT CHANGE UP (ref 0–0)
PHOSPHATE SERPL-MCNC: 3.2 MG/DL — SIGNIFICANT CHANGE UP (ref 2.5–4.5)
PLATELET # BLD AUTO: 247 K/UL — SIGNIFICANT CHANGE UP (ref 150–400)
PLATELET # BLD AUTO: 276 K/UL — SIGNIFICANT CHANGE UP (ref 150–400)
PMV BLD: 9.1 FL — SIGNIFICANT CHANGE UP (ref 7–13)
PMV BLD: 9.6 FL — SIGNIFICANT CHANGE UP (ref 7–13)
POTASSIUM SERPL-MCNC: 3.3 MMOL/L — LOW (ref 3.5–5.3)
POTASSIUM SERPL-SCNC: 3.3 MMOL/L — LOW (ref 3.5–5.3)
RBC # BLD: 2.5 M/UL — LOW (ref 4.2–5.8)
RBC # BLD: 3.12 M/UL — LOW (ref 4.2–5.8)
RBC # FLD: 16.4 % — HIGH (ref 10.3–14.5)
RBC # FLD: 17.4 % — HIGH (ref 10.3–14.5)
SODIUM SERPL-SCNC: 142 MMOL/L — SIGNIFICANT CHANGE UP (ref 135–145)
TRIGL SERPL-MCNC: 113 MG/DL — SIGNIFICANT CHANGE UP
WBC # BLD: 3.95 K/UL — SIGNIFICANT CHANGE UP (ref 3.8–10.5)
WBC # BLD: 6.45 K/UL — SIGNIFICANT CHANGE UP (ref 3.8–10.5)
WBC # FLD AUTO: 3.95 K/UL — SIGNIFICANT CHANGE UP (ref 3.8–10.5)
WBC # FLD AUTO: 6.45 K/UL — SIGNIFICANT CHANGE UP (ref 3.8–10.5)

## 2025-07-09 PROCEDURE — 99233 SBSQ HOSP IP/OBS HIGH 50: CPT | Mod: GC

## 2025-07-09 RX ORDER — OXYCODONE HYDROCHLORIDE 30 MG/1
2.5 TABLET ORAL EVERY 4 HOURS
Refills: 0 | Status: DISCONTINUED | OUTPATIENT
Start: 2025-07-09 | End: 2025-07-11

## 2025-07-09 RX ORDER — OXYCODONE HYDROCHLORIDE 30 MG/1
5 TABLET ORAL EVERY 4 HOURS
Refills: 0 | Status: DISCONTINUED | OUTPATIENT
Start: 2025-07-09 | End: 2025-07-11

## 2025-07-09 RX ORDER — ALBUMIN (HUMAN) 12.5 G/50ML
50 INJECTION, SOLUTION INTRAVENOUS ONCE
Refills: 0 | Status: COMPLETED | OUTPATIENT
Start: 2025-07-09 | End: 2025-07-09

## 2025-07-09 RX ORDER — HYDROMORPHONE/SOD CHLOR,ISO/PF 2 MG/10 ML
2 SYRINGE (ML) INJECTION EVERY 4 HOURS
Refills: 0 | Status: DISCONTINUED | OUTPATIENT
Start: 2025-07-09 | End: 2025-07-11

## 2025-07-09 RX ORDER — POLYETHYLENE GLYCOL 3350 17 G/17G
17 POWDER, FOR SOLUTION ORAL DAILY
Refills: 0 | Status: DISCONTINUED | OUTPATIENT
Start: 2025-07-09 | End: 2025-07-11

## 2025-07-09 RX ORDER — ALBUMIN (HUMAN) 12.5 G/50ML
100 INJECTION, SOLUTION INTRAVENOUS
Refills: 0 | Status: DISCONTINUED | OUTPATIENT
Start: 2025-07-09 | End: 2025-07-09

## 2025-07-09 RX ORDER — ALBUMIN (HUMAN) 12.5 G/50ML
100 INJECTION, SOLUTION INTRAVENOUS ONCE
Refills: 0 | Status: COMPLETED | OUTPATIENT
Start: 2025-07-09 | End: 2025-07-09

## 2025-07-09 RX ORDER — HYDROMORPHONE/SOD CHLOR,ISO/PF 2 MG/10 ML
4 SYRINGE (ML) INJECTION EVERY 4 HOURS
Refills: 0 | Status: DISCONTINUED | OUTPATIENT
Start: 2025-07-09 | End: 2025-07-11

## 2025-07-09 RX ADMIN — Medication 5 MILLIGRAM(S): at 21:55

## 2025-07-09 RX ADMIN — Medication 4 MILLIGRAM(S): at 18:26

## 2025-07-09 RX ADMIN — Medication 25 GRAM(S): at 14:36

## 2025-07-09 RX ADMIN — ALBUMIN (HUMAN) 50 MILLILITER(S): 12.5 INJECTION, SOLUTION INTRAVENOUS at 18:42

## 2025-07-09 RX ADMIN — Medication 40 MILLIEQUIVALENT(S): at 14:35

## 2025-07-09 RX ADMIN — Medication 1 APPLICATION(S): at 17:38

## 2025-07-09 RX ADMIN — ALBUMIN (HUMAN) 50 MILLILITER(S): 12.5 INJECTION, SOLUTION INTRAVENOUS at 21:05

## 2025-07-09 RX ADMIN — BUPROPION HYDROBROMIDE 150 MILLIGRAM(S): 522 TABLET, EXTENDED RELEASE ORAL at 17:37

## 2025-07-09 RX ADMIN — Medication 2 TABLET(S): at 21:55

## 2025-07-09 RX ADMIN — BICTEGRAVIR SODIUM, EMTRICITABINE, AND TENOFOVIR ALAFENAMIDE FUMARATE 1 TABLET(S): 50; 200; 25 TABLET ORAL at 17:37

## 2025-07-09 RX ADMIN — OXYBUTYNIN CHLORIDE 5 MILLIGRAM(S): 5 TABLET, FILM COATED, EXTENDED RELEASE ORAL at 17:37

## 2025-07-09 RX ADMIN — EZETIMIBE 10 MILLIGRAM(S): 10 TABLET ORAL at 21:55

## 2025-07-09 RX ADMIN — Medication 50 MILLIGRAM(S): at 21:56

## 2025-07-09 RX ADMIN — OXYCODONE HYDROCHLORIDE 5 MILLIGRAM(S): 30 TABLET ORAL at 15:22

## 2025-07-09 RX ADMIN — Medication 4 MILLIGRAM(S): at 17:36

## 2025-07-09 RX ADMIN — OXYBUTYNIN CHLORIDE 5 MILLIGRAM(S): 5 TABLET, FILM COATED, EXTENDED RELEASE ORAL at 06:25

## 2025-07-09 RX ADMIN — ALBUMIN (HUMAN) 50 MILLILITER(S): 12.5 INJECTION, SOLUTION INTRAVENOUS at 23:20

## 2025-07-09 RX ADMIN — Medication 25 GRAM(S): at 06:23

## 2025-07-09 RX ADMIN — OXYCODONE HYDROCHLORIDE 5 MILLIGRAM(S): 30 TABLET ORAL at 14:35

## 2025-07-09 NOTE — PROGRESS NOTE ADULT - PROBLEM SELECTOR PLAN 4
Presented with BP 78/60    Plan:  - Hold home metoprolol XL 100mg daily and diltiazem 240mg daily Presented with BP 78/60  Repeat BP readings improved    Plan:  - Hold home metoprolol XL 100mg daily and diltiazem 240mg daily

## 2025-07-09 NOTE — PROGRESS NOTE ADULT - PROBLEM SELECTOR PLAN 5
Chronic stable  Continue zetia 10mg nightly   A1c and lipid panel in AM Chronic stable  Continue zetia 10mg nightly   lipid profile noted chol 115, , HDL 22, LDL 72.

## 2025-07-09 NOTE — SWALLOW BEDSIDE ASSESSMENT ADULT - SWALLOW EVAL: DIAGNOSIS
Functional oral stage for puree, regular solids, and thin liquids characterized by adequate retrieval, functional mastication of solid, adequate anterior posterior transfer, and oral clearance. Functional pharyngeal stage for the above noted consistencies characterized by swallow initiation & hyolaryngeal excursion upon digital palpation and no overt clinical s/s airway penetration/aspiration.

## 2025-07-09 NOTE — PROGRESS NOTE ADULT - PROBLEM SELECTOR PLAN 2
Hemoglobin today: 7.4  CT angio from today demonstrating no active extravasation or pseudoaneurysm.  No acute intervention indicated per urology.  No acute intervention per IR. recommend monitor output for now. The blood tinged output should clear within a few days.     Plan:  - Continue to flush with 5ccs of normal saline BID  - Continue oxybutynin 5mg Q8 hrs  - Per IR [id number] output for now Hemoglobin today: 7.4  CT angio from today demonstrating no active extravasation or pseudoaneurysm.  No acute intervention indicated per urology.  No acute intervention per IR. recommend monitor output for now. The blood tinged output should clear within a few days.     Plan:  - Continue to flush with 5ccs of normal saline BID  - Continue oxybutynin 5mg Q8 hrs  - Per IR [id number] output for now  - transfuse 1u PRBC for symptomatic anemia.

## 2025-07-09 NOTE — PROGRESS NOTE ADULT - PROBLEM SELECTOR PLAN 1
UA: LE: large, Nitrite +, , RBC 5424, Bacteria: few  Per wife sample taken from nephrostomy bag not tube-    Urology consulted: "No acute urologic surgical intervention indicated at this time "    Plan:  - Continue Zosyn. tailor antibiotic therapy based on sensitivities  - Consider ID consult pending cultures UA: LE: large, Nitrite +, , RBC 5424, Bacteria: few  Per wife sample taken from nephrostomy bag not tube-    Urology consulted: "No acute urologic surgical intervention indicated at this time "    Plan:  - Continue Zosyn. tailor antibiotic therapy based on sensitivities  - not septic, continue to reassess  - if MDR organism noted (or if clinically decompensates), will obtain ID assistance.[email or web address]

## 2025-07-09 NOTE — SWALLOW BEDSIDE ASSESSMENT ADULT - ASR SWALLOW RECOMMEND DIAG
Objective testing not warranted given no overt clinical s/s impaired airway protection at bedside and chest imaging without concern for pneumonia.

## 2025-07-09 NOTE — PROGRESS NOTE ADULT - TIME BILLING
55 minutes spent on total encounter. The necessity of the time spent during the encounter on this date of service was due to:     Reviewing labs/vitals/imaging/prior hospitalization and records/consultant recommendations, interviewing/examining patient, discussing findings/plan, risks/benefits re: blood transfusion, discussing on IDRs, coordinating care, documentation    This time excludes teaching and separately reported services.

## 2025-07-09 NOTE — SWALLOW BEDSIDE ASSESSMENT ADULT - ASR SWALLOW REFERRAL
Consider GI at physician discretion to rule out esophageal dysphagia given patient's complaints detailed above

## 2025-07-09 NOTE — SWALLOW BEDSIDE ASSESSMENT ADULT - ASPIRATION PRECAUTIONS
& Reflux precautions ; counseled patient with good understanding in return/yes Lab: -9 Billing Type: Third-Party Bill Detail Level: Detailed Post-Care Instructions: I reviewed with the patient in detail post-care instructions. Patient is to keep the biopsy site dry overnight, and then apply bacitracin twice daily until healed. Patient may apply hydrogen peroxide soaks to remove any crusting. Destruction After The Procedure: No Anesthesia Type: 1% lidocaine with epinephrine Cryotherapy Text: The wound bed was treated with cryotherapy after the biopsy was performed. Additional Anesthesia Volume In Cc (Will Not Render If 0): 0 Lab Facility: 3 Notification Instructions: Patient will be notified of biopsy results. However, patient instructed to call the office if not contacted within 2 weeks. Wound Care: Petrolatum Electrodesiccation Text: The wound bed was treated with electrodesiccation after the biopsy was performed. Depth Of Biopsy: dermis Consent: Written consent was obtained and risks were reviewed including but not limited to scarring, infection, bleeding, scabbing, incomplete removal, nerve damage and allergy to anesthesia. Biopsy Type: H and E Type Of Destruction Used: Curettage Anesthesia Volume In Cc (Will Not Render If 0): 0.5 Electrodesiccation And Curettage Text: The wound bed was treated with electrodesiccation and curettage after the biopsy was performed. Dressing: bandage Silver Nitrate Text: The wound bed was treated with silver nitrate after the biopsy was performed. Was A Bandage Applied: Yes Biopsy Method: Dermablade Hemostasis: Drysol Curettage Text: The wound bed was treated with curettage after the biopsy was performed.

## 2025-07-09 NOTE — SWALLOW BEDSIDE ASSESSMENT ADULT - COMMENTS
7/8 H&P: "59 yr old male with a pmh of HTN, HLD, HIV on Biktarvy, prostate cancer, anal cancer status post chemotherapy and radiation complicated by bilateral nephrostomy tubes secondary to urological damage and an ostomy, hernia repair with mesh placement in May recently discharged from the hospital on June 25 and completed penicillin G for secondary syphilis this past week.. Detailed Urological interventions/complications as per Urology note. Pt presents with sudden onset abdominal pain and right flank pain that is sharp, constant and 10/10. He also reports hematuria from the right nephrostomy that started around the same time. He is endorsing a feeling as if something is stuck in his esophagus when he eats and "feels it going down". Reports lightheadedness with ambulation."    CXR 7/8: IMPRESSION: Clear lungs.    Patient received AOx4, positioned self upright in bed, pleasant and conversant. Patient reports "this past weekend, I had 2 instances of 10/10 pain down my esophagus when I was eating dinner," describing meals such as chicken/rice, and thin liquids. Patient specified "it went down eventually, but it was so painful, now it's completely gone though." Patient agreeable to clinical swallow evaluation.

## 2025-07-09 NOTE — PROGRESS NOTE ADULT - SUBJECTIVE AND OBJECTIVE BOX
SUBJECTIVE:   LENGTH OF HOSPITAL STAY: 1d    CHIEF COMPLAINT:  Patient is a 59y old  Male who presents with a chief complaint of UTI, hematuria from right nephrostomy, hypotension (09 Jul 2025 07:51)      Events over the past 24 hours: No acute events overnight. Patient endorses continued bleeding from R nephrostomy tube. Patient endorses 8/10 abdominal pain around the area of his hernia repair. Patient endorses dizziness and weakness when standing. Patient denies fever, chest pain, palpitations, extremity swelling, or shortness of breath.       REVIEW OF SYSTEMS  Negative Except as mentioned above.    ALLERGIES:  No Known Allergies    MEDICATIONS:  STANDING MEDICATIONS  bictegravir 50 mG/emtricitabine 200 mG/tenofovir alafenamide 25 mG (BIKTARVY) 1 Tablet(s) Oral daily  buPROPion XL (24-Hour) . 150 milliGRAM(s) Oral daily  chlorhexidine 2% Cloths 1 Application(s) Topical <User Schedule>  ezetimibe 10 milliGRAM(s) Oral at bedtime  oxybutynin 5 milliGRAM(s) Oral every 8 hours  piperacillin/tazobactam IVPB.. 3.375 Gram(s) IV Intermittent every 8 hours  potassium chloride   Powder 40 milliEquivalent(s) Oral once  sodium chloride 0.9%. 1000 milliLiter(s) IV Continuous <Continuous>  traZODone 50 milliGRAM(s) Oral at bedtime    PRN MEDICATIONS  acetaminophen     Tablet .. 650 milliGRAM(s) Oral every 6 hours PRN  clonazePAM  Tablet 1 milliGRAM(s) Oral two times a day PRN  melatonin 3 milliGRAM(s) Oral at bedtime PRN  ondansetron Injectable 4 milliGRAM(s) IV Push every 8 hours PRN  oxyCODONE    IR 2.5 milliGRAM(s) Oral every 4 hours PRN  oxyCODONE    IR 5 milliGRAM(s) Oral every 4 hours PRN  polyethylene glycol 3350 17 Gram(s) Oral daily PRN  polyethylene glycol 3350 17 Gram(s) Oral daily PRN  senna 2 Tablet(s) Oral at bedtime PRN  zolpidem 5 milliGRAM(s) Oral at bedtime PRN  zolpidem 5 milliGRAM(s) Oral at bedtime PRN    OBJECTIVE:  VITALS:   T(F): 98.5 (07-09 @ 12:53), Max: 98.7 (07-08 @ 14:16)  HR: 76 (07-09 @ 12:53) (64 - 76)  BP: 122/72 (07-09 @ 12:53) (107/74 - 122/84)  RR: 18 (07-09 @ 12:53) (16 - 18)  SpO2: 98% (07-09 @ 12:53) (96% - 99%)    I&O's:     07-08 @ 07:01  -  07-09 @ 07:00  --------------------------------------------------------  IN: 440 mL / OUT: 420 mL / NET: 20 mL    PHYSICAL EXAM:  T(C): 36.9 (07-09-25 @ 12:53), Max: 37.1 (07-08-25 @ 14:16)  HR: 76 (07-09-25 @ 12:53) (64 - 76)  BP: 122/72 (07-09-25 @ 12:53) (107/74 - 122/84)  RR: 18 (07-09-25 @ 12:53) (16 - 18)  SpO2: 98% (07-09-25 @ 12:53) (96% - 99%)    CONSTITUTIONAL: Well groomed, no apparent distress, patient has pallor.   EYES: PERRLA and symmetric, EOMI, No conjunctival or scleral injection, non-icteric  ENMT: Oral mucosa with moist membranes.   NECK: Supple, symmetric and without tracheal deviation   RESP: No respiratory distress, no use of accessory muscles; CTA b/l, no WRR  CV: RRR, +S1S2, no MRG; no JVD; no peripheral edema  GI: Ostomy bag present. Soft, NT, ND, no rebound, no guarding; no palpable masses; no hepatosplenomegaly; no hernia palpated  : B/l nephrostomy tubes and bags present. R bag filled with serosanguinous fluid.  MSK: Gait exam deferred   SKIN: No rashes or ulcers noted; no subcutaneous nodules or induration palpable  NEURO: CN II-XII intact; sensation intact in upper and lower extremities b/l to light touch   PSYCH: Appropriate insight/judgment; A+O x 3, mood and affect appropriate, recent/remote memory intact    LABS:                        7.4    3.95  )-----------( 247      ( 09 Jul 2025 05:05 )             23.3             07-09    142  |  109[H]  |  9   ----------------------------<  107[H]  3.3[L]   |  22  |  0.85    Ca    8.1[L]      09 Jul 2025 05:05  Phos  3.2     07-09  Mg     2.00     07-09    TPro  7.1  /  Alb  3.9  /  TBili  0.2  /  DBili  x   /  AST  32  /  ALT  13  /  AlkPhos  91  07-08    LIVER FUNCTIONS - ( 08 Jul 2025 09:33 )  Alb: 3.9 g/dL / Pro: 7.1 g/dL / ALK PHOS: 91 U/L / ALT: 13 U/L / AST: 32 U/L / GGT: x         PT/INR - ( 08 Jul 2025 09:33 )   PT: 12.5 sec;   INR: 1.08 ratio         PTT - ( 08 Jul 2025 09:33 )  PTT:28.7 sec    Urinalysis Basic - ( 09 Jul 2025 05:05 )    Color: x / Appearance: x / SG: x / pH: x  Gluc: 107 mg/dL / Ketone: x  / Bili: x / Urobili: x   Blood: x / Protein: x / Nitrite: x   Leuk Esterase: x / RBC: x / WBC x   Sq Epi: x / Non Sq Epi: x / Bacteria: x    Urinalysis with Rflx Culture (collected 08 Jul 2025 10:41)    Culture - Blood (collected 08 Jul 2025 09:33)  Source: Blood Blood-Peripheral  Preliminary Report (09 Jul 2025 13:01):    No growth at 24 hours    Blood Glucose:    RADIOLOGY & ADDITIONAL TESTS:  Xray Chest 1 View- PORTABLE-Urgent:   ACC: [id number] EXAM:  XR CHEST PORTABLE URGENT 1V   ORDERED BY: MIGUEL FUNES     PROCEDURE DATE:  07/08/2025          INTERPRETATION:  CLINICAL INFORMATION: Sepsis    Time of Exam:  July 8, 2025 at 9:42 AM    EXAM:  Frontal Chest    FINDINGS:  Both lungs are equally aerated and free of any focal abnormalities.  The heart is not enlarged and there is no effusion or pneumothorax.  The bones show no acute findings.      COMPARISON: February 22, 2024        IMPRESSION: Clear lungs.    --- End of Report ---    < from: CT Angio Abdomen and Pelvis w/ IV Cont (07.08.25 @ 11:20) >  ACC: [id number] EXAM:  CT ANGIO ABD PELV (W)AW IC   ORDERED BY: MIGUEL FUNES     PROCEDURE DATE:  07/08/2025          INTERPRETATION:  CLINICAL INFORMATION: Abdominal pain, blood in   nephrostomy tube.    COMPARISON: CT abdomen and pelvis 6/16/2025.    CONTRAST/COMPLICATIONS:  IV Contrast: Omnipaque 350  90 cc administered   10 cc discarded  Oral Contrast: NONE.    PROCEDURE:  CT of the Abdomen and Pelvis was performed.  Precontrast, Arterial and Delayed phases were performed.  Sagittal and coronal reformats were performed.    FINDINGS:  LOWER CHEST: Bibasilar subsegmental atelectasis.    LIVER: Within normal limits.  BILE DUCTS: Normal caliber.  GALLBLADDER: Within normal limits.  SPLEEN: Within normal limits.  PANCREAS: Within normal limits.  ADRENALS: Within normal limits.  KIDNEYS/URETERS: Right percutaneous nephrostomy catheter terminating in   the right renal collecting system. Lobulated hyperdensity without clear   evidence for enhancement located within and expanding the right renal   pelvis that extends into an upper renal calyx and into the ureteropelvic   junction. Trace gas in the right renal pelvis, potentially due to recent   catheter manipulation. Mild diffuse urothelial thickening of the right   ureter. Left percutaneous nephroureteral catheter that terminates in the   urinary bladder. No left hydronephrosis. Bilateral renal cysts and   subcentimeter hypodense foci that are too small to characterize.    BLADDER: Underdistended.  REPRODUCTIVE ORGANS: The prostate is notenlarged.    BOWEL: Status post abdominoperineal resection and left lower abdominal   colostomy. No bowel obstruction.  PERITONEUM/RETROPERITONEUM: Within normal limits.  VESSELS: IVC filter. Atherosclerotic changes.  LYMPH NODES: No lymphadenopathy.  ABDOMINAL WALL: Postsurgical changes. Ventral abdominal mesh.  BONES: Degenerative changes.    IMPRESSION:  *  Right nephrostomy catheter and left nephroureteral catheter in place.  *  Lobulated hyperdensity within and expanding the right renal collecting   system extending to the ureteropelvic junction without clear evidence for   enhancement, suggestive of blood clot. Advise continued follow-up to   ensure resolution and to exclude underlying neoplasm.  *  Mild diffuse urothelial thickening ofthe right ureter, which can be   seen with inflammation or infection. Correlate with urinalysis.    < end of copied text >    CAITLYN YAO MD; Attending Radiologist  This document has been electronically signed. Jul 8 2025 10:54AM (07-08-25 @ 09:54)    Consults reviewed: Urology, IR, Speech

## 2025-07-09 NOTE — PROGRESS NOTE ADULT - ASSESSMENT
59 yr old male with a pmh of HTN, HLD, HIV on Biktarvy, prostate cancer, anal cancer s/p chemotherapy and radiation complicated by b/l nephrostomy tubes secondary to urological damage and an ostomy, hernia repair with mesh placement in May recently discharged from the hospital on June 25 and completed penicillin G for secondary syphilis this past week.. Pt presents with sudden onset abdominal pain and right flank pain that is sharp, constant and 10/10. Patient history is notable for hematuria, dizziness and weakness when ambulating since June. PE is notable for pallor, and serosanguinous fluid in R nephrostomy pouch. Urinalysis is positive for leuk esterase and nitrates. Hematuria differential includes UTI vs nephrostomy tube displacement.

## 2025-07-09 NOTE — CHART NOTE - NSCHARTNOTEFT_GEN_A_CORE
The Drug Utilization Report below displays all of the controlled substance prescriptions, if any, that your patient has filled in the last twelve months. The information displayed on this report is compiled from pharmacy submissions to the Department, and accurately reflects the information as submitted by the pharmacies.    This report was requested by: Amos Romero | Reference #: [id number]    There are no results for the search terms that you entered.

## 2025-07-10 ENCOUNTER — NON-APPOINTMENT (OUTPATIENT)
Age: 60
End: 2025-07-10

## 2025-07-10 ENCOUNTER — APPOINTMENT (OUTPATIENT)
Dept: INFECTIOUS DISEASE | Facility: CLINIC | Age: 60
End: 2025-07-10

## 2025-07-10 LAB
ALBUMIN SERPL ELPH-MCNC: 4.1 G/DL — SIGNIFICANT CHANGE UP (ref 3.3–5)
ALP SERPL-CCNC: 74 U/L — SIGNIFICANT CHANGE UP (ref 40–120)
ALT FLD-CCNC: 8 U/L — SIGNIFICANT CHANGE UP (ref 4–41)
ANION GAP SERPL CALC-SCNC: 13 MMOL/L — SIGNIFICANT CHANGE UP (ref 7–14)
AST SERPL-CCNC: 11 U/L — SIGNIFICANT CHANGE UP (ref 4–40)
BASOPHILS # BLD AUTO: 0.02 K/UL — SIGNIFICANT CHANGE UP (ref 0–0.2)
BASOPHILS NFR BLD AUTO: 0.3 % — SIGNIFICANT CHANGE UP (ref 0–2)
BILIRUB SERPL-MCNC: 0.4 MG/DL — SIGNIFICANT CHANGE UP (ref 0.2–1.2)
BUN SERPL-MCNC: 8 MG/DL — SIGNIFICANT CHANGE UP (ref 7–23)
CALCIUM SERPL-MCNC: 9.3 MG/DL — SIGNIFICANT CHANGE UP (ref 8.4–10.5)
CHLORIDE SERPL-SCNC: 106 MMOL/L — SIGNIFICANT CHANGE UP (ref 98–107)
CO2 SERPL-SCNC: 22 MMOL/L — SIGNIFICANT CHANGE UP (ref 22–31)
CREAT SERPL-MCNC: 0.91 MG/DL — SIGNIFICANT CHANGE UP (ref 0.5–1.3)
EGFR: 98 ML/MIN/1.73M2 — SIGNIFICANT CHANGE UP
EGFR: 98 ML/MIN/1.73M2 — SIGNIFICANT CHANGE UP
EOSINOPHIL # BLD AUTO: 0.06 K/UL — SIGNIFICANT CHANGE UP (ref 0–0.5)
EOSINOPHIL NFR BLD AUTO: 1 % — SIGNIFICANT CHANGE UP (ref 0–6)
GLUCOSE SERPL-MCNC: 103 MG/DL — HIGH (ref 70–99)
HCT VFR BLD CALC: 28.7 % — LOW (ref 39–50)
HGB BLD-MCNC: 9.6 G/DL — LOW (ref 13–17)
IMM GRANULOCYTES # BLD AUTO: 0.05 K/UL — SIGNIFICANT CHANGE UP (ref 0–0.07)
IMM GRANULOCYTES NFR BLD AUTO: 0.8 % — SIGNIFICANT CHANGE UP (ref 0–0.9)
LYMPHOCYTES # BLD AUTO: 2.09 K/UL — SIGNIFICANT CHANGE UP (ref 1–3.3)
LYMPHOCYTES NFR BLD AUTO: 33.6 % — SIGNIFICANT CHANGE UP (ref 13–44)
MAGNESIUM SERPL-MCNC: 2.1 MG/DL — SIGNIFICANT CHANGE UP (ref 1.6–2.6)
MCHC RBC-ENTMCNC: 30.3 PG — SIGNIFICANT CHANGE UP (ref 27–34)
MCHC RBC-ENTMCNC: 33.4 G/DL — SIGNIFICANT CHANGE UP (ref 32–36)
MCV RBC AUTO: 90.5 FL — SIGNIFICANT CHANGE UP (ref 80–100)
MONOCYTES # BLD AUTO: 0.74 K/UL — SIGNIFICANT CHANGE UP (ref 0–0.9)
MONOCYTES NFR BLD AUTO: 11.9 % — SIGNIFICANT CHANGE UP (ref 2–14)
NEUTROPHILS # BLD AUTO: 3.26 K/UL — SIGNIFICANT CHANGE UP (ref 1.8–7.4)
NEUTROPHILS NFR BLD AUTO: 52.4 % — SIGNIFICANT CHANGE UP (ref 43–77)
NRBC # BLD AUTO: 0 K/UL — SIGNIFICANT CHANGE UP (ref 0–0)
NRBC # FLD: 0 K/UL — SIGNIFICANT CHANGE UP (ref 0–0)
NRBC BLD AUTO-RTO: 0 /100 WBCS — SIGNIFICANT CHANGE UP (ref 0–0)
PHOSPHATE SERPL-MCNC: 2.9 MG/DL — SIGNIFICANT CHANGE UP (ref 2.5–4.5)
PLATELET # BLD AUTO: 275 K/UL — SIGNIFICANT CHANGE UP (ref 150–400)
PMV BLD: 9.4 FL — SIGNIFICANT CHANGE UP (ref 7–13)
POTASSIUM SERPL-MCNC: 3.7 MMOL/L — SIGNIFICANT CHANGE UP (ref 3.5–5.3)
POTASSIUM SERPL-SCNC: 3.7 MMOL/L — SIGNIFICANT CHANGE UP (ref 3.5–5.3)
PROT SERPL-MCNC: 6.8 G/DL — SIGNIFICANT CHANGE UP (ref 6–8.3)
RBC # BLD: 3.17 M/UL — LOW (ref 4.2–5.8)
RBC # FLD: 17.2 % — HIGH (ref 10.3–14.5)
SODIUM SERPL-SCNC: 141 MMOL/L — SIGNIFICANT CHANGE UP (ref 135–145)
WBC # BLD: 6.22 K/UL — SIGNIFICANT CHANGE UP (ref 3.8–10.5)
WBC # FLD AUTO: 6.22 K/UL — SIGNIFICANT CHANGE UP (ref 3.8–10.5)

## 2025-07-10 PROCEDURE — 99213 OFFICE O/P EST LOW 20 MIN: CPT | Mod: 95

## 2025-07-10 PROCEDURE — 99232 SBSQ HOSP IP/OBS MODERATE 35: CPT | Mod: GC

## 2025-07-10 RX ORDER — CEFEPIME 2 G/20ML
1000 INJECTION, POWDER, FOR SOLUTION INTRAVENOUS EVERY 12 HOURS
Refills: 0 | Status: DISCONTINUED | OUTPATIENT
Start: 2025-07-10 | End: 2025-07-11

## 2025-07-10 RX ORDER — CEFEPIME 2 G/20ML
INJECTION, POWDER, FOR SOLUTION INTRAVENOUS
Refills: 0 | Status: DISCONTINUED | OUTPATIENT
Start: 2025-07-10 | End: 2025-07-11

## 2025-07-10 RX ORDER — CEFEPIME 2 G/20ML
1000 INJECTION, POWDER, FOR SOLUTION INTRAVENOUS ONCE
Refills: 0 | Status: COMPLETED | OUTPATIENT
Start: 2025-07-10 | End: 2025-07-10

## 2025-07-10 RX ADMIN — BICTEGRAVIR SODIUM, EMTRICITABINE, AND TENOFOVIR ALAFENAMIDE FUMARATE 1 TABLET(S): 50; 200; 25 TABLET ORAL at 13:34

## 2025-07-10 RX ADMIN — Medication 4 MILLIGRAM(S): at 10:10

## 2025-07-10 RX ADMIN — CEFEPIME 100 MILLIGRAM(S): 2 INJECTION, POWDER, FOR SOLUTION INTRAVENOUS at 18:13

## 2025-07-10 RX ADMIN — Medication 5 MILLIGRAM(S): at 21:50

## 2025-07-10 RX ADMIN — OXYBUTYNIN CHLORIDE 5 MILLIGRAM(S): 5 TABLET, FILM COATED, EXTENDED RELEASE ORAL at 01:09

## 2025-07-10 RX ADMIN — Medication 4 MILLIGRAM(S): at 18:13

## 2025-07-10 RX ADMIN — CEFEPIME 100 MILLIGRAM(S): 2 INJECTION, POWDER, FOR SOLUTION INTRAVENOUS at 09:13

## 2025-07-10 RX ADMIN — Medication 40 MILLIEQUIVALENT(S): at 09:12

## 2025-07-10 RX ADMIN — EZETIMIBE 10 MILLIGRAM(S): 10 TABLET ORAL at 21:50

## 2025-07-10 RX ADMIN — BUPROPION HYDROBROMIDE 150 MILLIGRAM(S): 522 TABLET, EXTENDED RELEASE ORAL at 13:34

## 2025-07-10 RX ADMIN — Medication 1 APPLICATION(S): at 06:17

## 2025-07-10 RX ADMIN — OXYBUTYNIN CHLORIDE 5 MILLIGRAM(S): 5 TABLET, FILM COATED, EXTENDED RELEASE ORAL at 09:13

## 2025-07-10 RX ADMIN — OXYBUTYNIN CHLORIDE 5 MILLIGRAM(S): 5 TABLET, FILM COATED, EXTENDED RELEASE ORAL at 13:34

## 2025-07-10 RX ADMIN — Medication 25 GRAM(S): at 01:09

## 2025-07-10 RX ADMIN — Medication 4 MILLIGRAM(S): at 09:13

## 2025-07-10 RX ADMIN — OXYBUTYNIN CHLORIDE 5 MILLIGRAM(S): 5 TABLET, FILM COATED, EXTENDED RELEASE ORAL at 21:50

## 2025-07-10 RX ADMIN — Medication 2 TABLET(S): at 21:50

## 2025-07-10 RX ADMIN — Medication 50 MILLIGRAM(S): at 21:50

## 2025-07-10 NOTE — DISCHARGE NOTE PROVIDER - NSDCFUADDAPPT_GEN_ALL_CORE_FT
APPTS ARE READY TO BE MADE: [ ] YES    Best Family or Patient Contact (if needed):    Additional Information about above appointments (if needed):    1: Urology  2: PCP  3:     Other comments or requests:    APPTS ARE READY TO BE MADE: [X] YES    Best Family or Patient Contact (if needed):    Additional Information about above appointments (if needed):    1: Urology  2: PCP  3:     Other comments or requests:    APPTS ARE READY TO BE MADE: [X] YES    Best Family or Patient Contact (if needed):    Additional Information about above appointments (if needed):    1: Urology  2: PCP  3:     Other comments or requests:   PCP - Prior to outreaching the patient, it was visible that the patient has secured a follow up appointment which was not scheduled by our team. Patient is scheduled on 8/6 at 2:20pm with Dr Schwartz at [id number] [facility]  URO - Prior to outreaching the patient, it was visible that the patient has secured a follow up appointment which was not scheduled by our team. Patient is scheduled on 7/17 at 11:30am with Dr Don at [id number]

## 2025-07-10 NOTE — DISCHARGE NOTE PROVIDER - NSDCCPCAREPLAN_GEN_ALL_CORE_FT
PRINCIPAL DISCHARGE DIAGNOSIS  Diagnosis: Hematuria  Assessment and Plan of Treatment: You were diagnosed with blood in your urine, which lowered your blood hemoglobin levels. It can happen for different reasons like an infection or kidney stone. We believe an infection caused the blood in your urine as your urine sample was positive for bacteria. For this we treated you with antibiotics in the hospital.  If you have reoccurance of bloody urine please come back to the hospital.

## 2025-07-10 NOTE — DISCHARGE NOTE PROVIDER - NSDCMRMEDTOKEN_GEN_ALL_CORE_FT
bictegravir/emtricitabine/tenofovir 50 mg-200 mg-25 mg oral tablet: 1 tab(s) orally once a day  buPROPion 150 mg/24 hours (XL) oral tablet, extended release: 1 tab(s) orally once a day  cholecalciferol 50 mcg (2000 intl units) oral capsule: 1 cap(s) orally once a day  clonazePAM 1 mg oral tablet: 1 tab(s) orally 2 times a day as needed for  anxiety  Dilaudid 2 mg oral tablet: 1 tab(s) orally every 6 hours As needed Severe Pain (7 - 10)  ezetimibe 10 mg oral tablet: 1 tab(s) orally once a day (at bedtime)  metoprolol succinate 100 mg oral capsule, extended release: 1 cap(s) orally once a day am  Multiple Vitamins oral tablet: 1 tab(s) orally once a day  oxyBUTYnin 5 mg oral tablet: 1 tab(s) orally every 8 hours  polyethylene glycol 3350 oral powder for reconstitution: 17 gram(s) orally once a day as needed for  constipation  senna leaf extract oral tablet: 2 tab(s) orally once a day as needed for  constipation  Tiadylt  mg/24 hours oral capsule, extended release: 1 cap(s) orally once a day am  traZODone 50 mg oral tablet: 1 tab(s) orally once a day (at bedtime)  zolpidem 10 mg oral tablet: 1 tab(s) orally once a day (at bedtime)   bictegravir/emtricitabine/tenofovir 50 mg-200 mg-25 mg oral tablet: 1 tab(s) orally once a day  buPROPion 150 mg/24 hours (XL) oral tablet, extended release: 1 tab(s) orally once a day  cholecalciferol 50 mcg (2000 intl units) oral capsule: 1 cap(s) orally once a day  clonazePAM 1 mg oral tablet: 1 tab(s) orally 2 times a day as needed for  anxiety  Dilaudid 2 mg oral tablet: 1 tab(s) orally every 6 hours As needed Severe Pain (7 - 10)  ezetimibe 10 mg oral tablet: 1 tab(s) orally once a day (at bedtime)  KlonoPIN 1 mg oral tablet: 1 tab(s) orally 2 times a day As needed for anxiety  metoprolol succinate 100 mg oral capsule, extended release: 1 cap(s) orally once a day am  Multiple Vitamins oral tablet: 1 tab(s) orally once a day  oxyBUTYnin 5 mg oral tablet: 1 tab(s) orally every 8 hours  polyethylene glycol 3350 oral powder for reconstitution: 17 gram(s) orally once a day As needed for constipation  polyethylene glycol 3350 oral powder for reconstitution: 17 gram(s) orally once a day as needed for  constipation  senna leaf extract oral tablet: 2 tab(s) orally once a day as needed for  constipation  senna leaf extract oral tablet: 2 tab(s) orally once a day (at bedtime) As needed for constipation  Tiadylt  mg/24 hours oral capsule, extended release: 1 cap(s) orally once a day am  traZODone 50 mg oral tablet: 1 tab(s) orally once a day (at bedtime)  zolpidem 10 mg oral tablet: 1 tab(s) orally once a day (at bedtime)   bictegravir/emtricitabine/tenofovir 50 mg-200 mg-25 mg oral tablet: 1 tab(s) orally once a day  buPROPion 150 mg/24 hours (XL) oral tablet, extended release: 1 tab(s) orally once a day  cholecalciferol 50 mcg (2000 intl units) oral capsule: 1 cap(s) orally once a day  Dilaudid 2 mg oral tablet: 1 tab(s) orally every 4 hours as needed for  severe pain MDD: 12mg  Dilaudid 2 mg oral tablet: 1 tab(s) orally every 4 hours As needed Moderate Pain (4 - 6)  ezetimibe 10 mg oral tablet: 1 tab(s) orally once a day (at bedtime)  KlonoPIN 1 mg oral tablet: 1 tab(s) orally 2 times a day As needed for anxiety  metoprolol succinate 100 mg oral capsule, extended release: 1 cap(s) orally once a day am  Multiple Vitamins oral tablet: 1 tab(s) orally once a day  oxyBUTYnin 5 mg oral tablet: 1 tab(s) orally every 8 hours  polyethylene glycol 3350 oral powder for reconstitution: 17 gram(s) orally once a day As needed for constipation  senna leaf extract oral tablet: 2 tab(s) orally once a day (at bedtime) As needed for constipation  Tiadylt  mg/24 hours oral capsule, extended release: 1 cap(s) orally once a day am  traZODone 50 mg oral tablet: 1 tab(s) orally once a day (at bedtime)   bictegravir/emtricitabine/tenofovir 50 mg-200 mg-25 mg oral tablet: 1 tab(s) orally once a day  buPROPion 150 mg/24 hours (XL) oral tablet, extended release: 1 tab(s) orally once a day  cholecalciferol 50 mcg (2000 intl units) oral capsule: 1 cap(s) orally once a day  Dilaudid 2 mg oral tablet: 1 tab(s) orally every 4 hours As needed Moderate Pain (4 - 6)  ezetimibe 10 mg oral tablet: 1 tab(s) orally once a day (at bedtime)  KlonoPIN 1 mg oral tablet: 1 tab(s) orally 2 times a day As needed for anxiety  metoprolol succinate 100 mg oral capsule, extended release: 1 cap(s) orally once a day am  Multiple Vitamins oral tablet: 1 tab(s) orally once a day  oxyBUTYnin 5 mg oral tablet: 1 tab(s) orally every 8 hours  polyethylene glycol 3350 oral powder for reconstitution: 17 gram(s) orally once a day As needed for constipation  senna leaf extract oral tablet: 2 tab(s) orally once a day (at bedtime) As needed for constipation  Tiadylt  mg/24 hours oral capsule, extended release: 1 cap(s) orally once a day am  traZODone 50 mg oral tablet: 1 tab(s) orally once a day (at bedtime)   bictegravir/emtricitabine/tenofovir 50 mg-200 mg-25 mg oral tablet: 1 tab(s) orally once a day  buPROPion 150 mg/24 hours (XL) oral tablet, extended release: 1 tab(s) orally once a day  cholecalciferol 50 mcg (2000 intl units) oral capsule: 1 cap(s) orally once a day  Dilaudid 2 mg oral tablet: 1 tab(s) orally every 6 hours as needed for  severe pain MDD: 8mg  ezetimibe 10 mg oral tablet: 1 tab(s) orally once a day (at bedtime)  KlonoPIN 1 mg oral tablet: 1 tab(s) orally 2 times a day As needed for anxiety  metoprolol succinate 100 mg oral capsule, extended release: 1 cap(s) orally once a day am  Multiple Vitamins oral tablet: 1 tab(s) orally once a day  Narcan 4 mg/0.1 mL nasal spray: 1 spray(s) intranasally  oxyBUTYnin 5 mg oral tablet: 1 tab(s) orally every 8 hours  polyethylene glycol 3350 oral powder for reconstitution: 17 gram(s) orally once a day As needed for constipation  senna leaf extract oral tablet: 2 tab(s) orally once a day (at bedtime) As needed for constipation  Tiadylt  mg/24 hours oral capsule, extended release: 1 cap(s) orally once a day am  traZODone 50 mg oral tablet: 1 tab(s) orally once a day (at bedtime)   acetaminophen 500 mg oral tablet: 2 tab(s) orally every 8 hours  amoxicillin 500 mg oral tablet: 1 tab(s) orally 3 times a day  bictegravir/emtricitabine/tenofovir 50 mg-200 mg-25 mg oral tablet: 1 tab(s) orally once a day  buPROPion 150 mg/24 hours (XL) oral tablet, extended release: 1 tab(s) orally once a day  ciprofloxacin 750 mg oral tablet: 1 tab(s) orally 2 times a day  clonazePAM 1 mg oral tablet: 1 tab(s) orally 2 times a day As needed for anxiety  Dilaudid 4 mg oral tablet: 1 tab(s) orally every 4 hours as needed for Severe Pain (7 - 10) MDD: 6  ezetimibe 10 mg oral tablet: 1 tab(s) orally once a day (at bedtime)  lidocaine 4% topical film: Apply topically to affected area once a day  metoprolol succinate 100 mg oral tablet, extended release: 1 tab(s) orally once a day  naloxone 4 mg/0.1 mL nasal spray: 1 spray(s) intranasally 4 times a day Instill 1 spray into nostril &amp; repeat in 2-3 minutes in alternate nostril for opiod overdose  polyethylene glycol 3350 oral powder for reconstitution: 17 gram(s) orally once a day As needed for constipation  senna leaf extract oral tablet: 2 tab(s) orally once a day (at bedtime) As needed for constipation  Tiadylt  mg/24 hours oral capsule, extended release: 1 cap(s) orally once a day  traZODone 50 mg oral tablet: 50 milligram(s) orally once a day (at bedtime)  zolpidem 10 mg oral tablet: 1 tab(s) orally once a day (at bedtime) as needed for  insomnia

## 2025-07-10 NOTE — DISCHARGE NOTE PROVIDER - CARE PROVIDER_API CALL
Jarad Schwartz (DO)  Family Medicine  [address] [place] [address], [address]  [place] [address]  Phone: [phone]  Fax: [phone]  Follow Up Time: 1 month

## 2025-07-10 NOTE — PROGRESS NOTE ADULT - SUBJECTIVE AND OBJECTIVE BOX
SUBJECTIVE:   LENGTH OF HOSPITAL STAY: 2d    CHIEF COMPLAINT:  Patient is a 59y old  Male who presents with a chief complaint of UTI, hematuria from right nephrostomy, hypotension (10 Jul 2025 11:24)    Events over the past 24 hours: No acute events overnight. Yesterday received 1u PRBC. Patient endorses improved abdominal pain control today and improvement in R nephrostomy urine color. Patient also reports less fatigue today. Patient reports concern over having repeat bleeding and is looking for a cause. Patient denies any fever, chills, lightheadedness dizziness, chest pain, palpitations. shortness of breath, or extremity edema.     REVIEW OF SYSTEMS  Negative Except as mentioned above.    ALLERGIES:  No Known Allergies    MEDICATIONS:  STANDING MEDICATIONS  bictegravir 50 mG/emtricitabine 200 mG/tenofovir alafenamide 25 mG (BIKTARVY) 1 Tablet(s) Oral daily  buPROPion XL (24-Hour) . 150 milliGRAM(s) Oral daily  cefepime   IVPB      cefepime   IVPB 1000 milliGRAM(s) IV Intermittent every 12 hours  chlorhexidine 2% Cloths 1 Application(s) Topical <User Schedule>  ezetimibe 10 milliGRAM(s) Oral at bedtime  oxybutynin 5 milliGRAM(s) Oral every 8 hours  sodium chloride 0.9%. 1000 milliLiter(s) IV Continuous <Continuous>  traZODone 50 milliGRAM(s) Oral at bedtime    PRN MEDICATIONS  acetaminophen     Tablet .. 650 milliGRAM(s) Oral every 6 hours PRN  clonazePAM  Tablet 1 milliGRAM(s) Oral two times a day PRN  HYDROmorphone   Tablet 2 milliGRAM(s) Oral every 4 hours PRN  HYDROmorphone   Tablet 4 milliGRAM(s) Oral every 4 hours PRN  melatonin 3 milliGRAM(s) Oral at bedtime PRN  ondansetron Injectable 4 milliGRAM(s) IV Push every 8 hours PRN  oxyCODONE    IR 2.5 milliGRAM(s) Oral every 4 hours PRN  oxyCODONE    IR 5 milliGRAM(s) Oral every 4 hours PRN  polyethylene glycol 3350 17 Gram(s) Oral daily PRN  polyethylene glycol 3350 17 Gram(s) Oral daily PRN  senna 2 Tablet(s) Oral at bedtime PRN  zolpidem 5 milliGRAM(s) Oral at bedtime PRN  zolpidem 5 milliGRAM(s) Oral at bedtime PRN    OBJECTIVE:  VITALS:   T(F): 98.2 (07-10 @ 04:54), Max: 98.5 (07-09 @ 12:53)  HR: 79 (07-10 @ 04:54) (75 - 84)  BP: 139/86 (07-10 @ 04:54) (108/74 - 139/86)  RR: 16 (07-10 @ 04:54) (16 - 18)  SpO2: 97% (07-10 @ 04:54) (95% - 99%)    I&O's:     07-09 @ 07:01  -  07-10 @ 07:00  --------------------------------------------------------  IN: 640 mL / OUT: 3360 mL / NET: -2720 mL    PHYSICAL EXAM:  T(C): 36.8 (07-10-25 @ 04:54), Max: 36.9 (07-09-25 @ 12:53)  HR: 79 (07-10-25 @ 04:54) (75 - 84)  BP: 139/86 (07-10-25 @ 04:54) (108/74 - 139/86)  RR: 16 (07-10-25 @ 04:54) (16 - 18)  SpO2: 97% (07-10-25 @ 04:54) (95% - 99%)    CONSTITUTIONAL: Well groomed, no apparent distress  EYES: PERRLA and symmetric, No conjunctival or scleral injection, non-icteric  ENMT: Oral mucosa with moist membranes. Normal dentition; no pharyngeal injection or exudates  NECK: Supple, symmetric and without tracheal deviation   RESP: No respiratory distress, no use of accessory muscles; CTA b/l, no WRR  CV: RRR, +S1S2, no MRG; no JVD; no peripheral edema  GI: Ostomy bag present. Soft, NT, ND, no rebound, no guarding; no palpable masses; no hepatosplenomegaly; no hernia palpated  : B/l nephrostomy bags and pouches present. Urine on R side mildly dark.   MSK: Gait exam deferred  SKIN: No rashes or ulcers noted; no subcutaneous nodules or induration palpable  NEURO: CN II-XII intact, sensation intact in upper and lower extremities b/l to light touch   PSYCH: Appropriate insight/judgment; A+O x 3, mood and affect appropriate, recent/remote memory intact    LABS:                        9.6    6.22  )-----------( 275      ( 10 Jul 2025 06:00 )             28.7             07-10    141  |  106  |  8   ----------------------------<  103[H]  3.7   |  22  |  0.91    Ca    9.3      10 Jul 2025 06:00  Phos  2.9     07-10  Mg     2.10     07-10    TPro  6.8  /  Alb  4.1  /  TBili  0.4  /  DBili  x   /  AST  11  /  ALT  8   /  AlkPhos  74  07-10    LIVER FUNCTIONS - ( 10 Jul 2025 06:00 )  Alb: 4.1 g/dL / Pro: 6.8 g/dL / ALK PHOS: 74 U/L / ALT: 8 U/L / AST: 11 U/L / GGT: x                   Urinalysis Basic - ( 10 Jul 2025 06:00 )    Color: x / Appearance: x / SG: x / pH: x  Gluc: 103 mg/dL / Ketone: x  / Bili: x / Urobili: x   Blood: x / Protein: x / Nitrite: x   Leuk Esterase: x / RBC: x / WBC x   Sq Epi: x / Non Sq Epi: x / Bacteria: x    Urinalysis with Rflx Culture (collected 08 Jul 2025 10:41)    Culture - Urine (collected 08 Jul 2025 10:41)  Source: Clean Catch  Final Report (09 Jul 2025 14:54):    <10,000 CFU/mL Normal Urogenital Genet    Culture - Blood (collected 08 Jul 2025 09:50)  Source: Blood Blood-Peripheral  Preliminary Report (09 Jul 2025 17:01):    No growth at 24 hours    Culture - Blood (collected 08 Jul 2025 09:33)  Source: Blood Blood-Peripheral  Preliminary Report (09 Jul 2025 13:01):    No growth at 24 hours    Blood Glucose:    RADIOLOGY & ADDITIONAL TESTS:    Consult recommendations reviewed: Urology          CAITLYN YAO MD; Attending Radiologist  This document has been electronically signed. Jul 8 2025 10:54AM (07-08-25 @ 09:54)                   SUBJECTIVE:   LENGTH OF HOSPITAL STAY: 2d    CHIEF COMPLAINT:  Patient is a 59y old  Male who presents with a chief complaint of UTI, hematuria from right nephrostomy, hypotension (10 Jul 2025 11:24)    Events over the past 24 hours: No acute events overnight. Yesterday received 1u PRBC. Patient endorses improved abdominal pain control today and improvement in R nephrostomy urine color. Patient also reports less fatigue today. Patient reports concern over having repeat bleeding and is looking for a cause. Patient denies any fever, chills, lightheadedness dizziness, chest pain, palpitations. shortness of breath, or extremity edema.     REVIEW OF SYSTEMS  Negative Except as mentioned above.    ALLERGIES:  No Known Allergies    MEDICATIONS:  STANDING MEDICATIONS  bictegravir 50 mG/emtricitabine 200 mG/tenofovir alafenamide 25 mG (BIKTARVY) 1 Tablet(s) Oral daily  buPROPion XL (24-Hour) . 150 milliGRAM(s) Oral daily  cefepime   IVPB      cefepime   IVPB 1000 milliGRAM(s) IV Intermittent every 12 hours  chlorhexidine 2% Cloths 1 Application(s) Topical <User Schedule>  ezetimibe 10 milliGRAM(s) Oral at bedtime  oxybutynin 5 milliGRAM(s) Oral every 8 hours  sodium chloride 0.9%. 1000 milliLiter(s) IV Continuous <Continuous>  traZODone 50 milliGRAM(s) Oral at bedtime    PRN MEDICATIONS  acetaminophen     Tablet .. 650 milliGRAM(s) Oral every 6 hours PRN  clonazePAM  Tablet 1 milliGRAM(s) Oral two times a day PRN  HYDROmorphone   Tablet 2 milliGRAM(s) Oral every 4 hours PRN  HYDROmorphone   Tablet 4 milliGRAM(s) Oral every 4 hours PRN  melatonin 3 milliGRAM(s) Oral at bedtime PRN  ondansetron Injectable 4 milliGRAM(s) IV Push every 8 hours PRN  oxyCODONE    IR 2.5 milliGRAM(s) Oral every 4 hours PRN  oxyCODONE    IR 5 milliGRAM(s) Oral every 4 hours PRN  polyethylene glycol 3350 17 Gram(s) Oral daily PRN  polyethylene glycol 3350 17 Gram(s) Oral daily PRN  senna 2 Tablet(s) Oral at bedtime PRN  zolpidem 5 milliGRAM(s) Oral at bedtime PRN  zolpidem 5 milliGRAM(s) Oral at bedtime PRN    OBJECTIVE:  VITALS:   T(F): 98.2 (07-10 @ 04:54), Max: 98.5 (07-09 @ 12:53)  HR: 79 (07-10 @ 04:54) (75 - 84)  BP: 139/86 (07-10 @ 04:54) (108/74 - 139/86)  RR: 16 (07-10 @ 04:54) (16 - 18)  SpO2: 97% (07-10 @ 04:54) (95% - 99%)    I&O's:     07-09 @ 07:01  -  07-10 @ 07:00  --------------------------------------------------------  IN: 640 mL / OUT: 3360 mL / NET: -2720 mL    PHYSICAL EXAM:  T(C): 36.8 (07-10-25 @ 04:54), Max: 36.9 (07-09-25 @ 12:53)  HR: 79 (07-10-25 @ 04:54) (75 - 84)  BP: 139/86 (07-10-25 @ 04:54) (108/74 - 139/86)  RR: 16 (07-10-25 @ 04:54) (16 - 18)  SpO2: 97% (07-10-25 @ 04:54) (95% - 99%)    CONSTITUTIONAL: Well groomed, no apparent distress  EYES: PERRLA and symmetric, No conjunctival or scleral injection, non-icteric  ENMT: Oral mucosa with moist membranes. Normal dentition; no pharyngeal injection or exudates  NECK: Supple, symmetric and without tracheal deviation   RESP: No respiratory distress, no use of accessory muscles; CTA b/l, no WRR  CV: RRR, +S1S2, no MRG; no JVD; no peripheral edema  GI: Ostomy bag present. Soft, NT, ND, no rebound, no guarding; no palpable masses; no hepatosplenomegaly; no hernia palpated  : B/l nephrostomy bags and pouches present. Urine on R side mildly dark.   MSK: Gait exam deferred  SKIN: No rashes or ulcers noted; no subcutaneous nodules or induration palpable  NEURO: CN II-XII intact, sensation intact in upper and lower extremities b/l to light touch   PSYCH: Appropriate insight/judgment; A+O x 3, mood and affect appropriate, recent/remote memory intact    LABS:                        9.6    6.22  )-----------( 275      ( 10 Jul 2025 06:00 )             28.7               07-10    141  |  106  |  8   ----------------------------<  103[H]  3.7   |  22  |  0.91    Ca    9.3      10 Jul 2025 06:00  Phos  2.9     07-10  Mg     2.10     07-10    TPro  6.8  /  Alb  4.1  /  TBili  0.4  /  DBili  x   /  AST  11  /  ALT  8   /  AlkPhos  74  07-10    LIVER FUNCTIONS - ( 10 Jul 2025 06:00 )  Alb: 4.1 g/dL / Pro: 6.8 g/dL / ALK PHOS: 74 U/L / ALT: 8 U/L / AST: 11 U/L / GGT: x                   Urinalysis Basic - ( 10 Jul 2025 06:00 )    Color: x / Appearance: x / SG: x / pH: x  Gluc: 103 mg/dL / Ketone: x  / Bili: x / Urobili: x   Blood: x / Protein: x / Nitrite: x   Leuk Esterase: x / RBC: x / WBC x   Sq Epi: x / Non Sq Epi: x / Bacteria: x    Urinalysis with Rflx Culture (collected 08 Jul 2025 10:41)    Culture - Urine (collected 08 Jul 2025 10:41)  Source: Clean Catch  Final Report (09 Jul 2025 14:54):    <10,000 CFU/mL Normal Urogenital Genet    Culture - Blood (collected 08 Jul 2025 09:50)  Source: Blood Blood-Peripheral  Preliminary Report (09 Jul 2025 17:01):    No growth at 24 hours    Culture - Blood (collected 08 Jul 2025 09:33)  Source: Blood Blood-Peripheral  Preliminary Report (09 Jul 2025 13:01):    No growth at 24 hours    Blood Glucose:    RADIOLOGY & ADDITIONAL TESTS:    Consult recommendations reviewed: Urology          CAITLYN YAO MD; Attending Radiologist  This document has been electronically signed. Jul 8 2025 10:54AM (07-08-25 @ 09:54)

## 2025-07-10 NOTE — PROGRESS NOTE ADULT - PROBLEM SELECTOR PLAN 4
Presented with BP 78/60  Repeat BP readings improved    Plan:  - Hold home metoprolol XL 100mg daily and diltiazem 240mg daily Presented with BP 78/60  Repeat BP readings improved    Plan:  - Hold home metoprolol XL 100mg daily and diltiazem 240mg daily  - monitor BP and determine if/when should resume (pending stability)

## 2025-07-10 NOTE — DISCHARGE NOTE PROVIDER - NSDCFUSCHEDAPPT_GEN_ALL_CORE_FT
Marii Becerra  [facility]  INFDISEASE [address]  Scheduled Appointment: 07/10/2025    Jarad Schwartz  [facility]  INTMED [id number]  Scheduled Appointment: 10/03/2025     Marii Becerra  [facility]  IN[address]ISEASE [address]  Scheduled Appointment: 08/07/2025    Jarad Schwartz  [facility]  INTMED [id number]  Scheduled Appointment: 10/03/2025     Jarad Schwartz  [facility]  INTMED [id number]  Scheduled Appointment: 08/06/2025    Marii Becerra  [facility]  IN[address]ISEASE [address]  Scheduled Appointment: 08/07/2025    [facility]  GERIATRICS [address]   Scheduled Appointment: 08/12/2025    Santi Don  [facility]  UROLOGY [address]  Scheduled Appointment: 08/14/2025    Jarad Schwartz  [facility]  INT[id number]  Scheduled Appointment: 10/03/2025

## 2025-07-10 NOTE — PROGRESS NOTE ADULT - ASSESSMENT
59 yr old male with a pmh of HTN, HLD, HIV on Biktarvy, prostate cancer, anal cancer s/p chemotherapy and radiation complicated by b/l nephrostomy tubes secondary to urological damage and an ostomy, hernia repair with mesh placement in May recently discharged from the hospital on June 25 and completed penicillin G for secondary syphilis this past week.. Pt presents with sudden onset abdominal pain and right flank pain that is sharp, constant and 10/10. Patient history is notable for hematuria, dizziness and weakness when ambulating since June. PE is notable for pallor, and serosanguinous fluid in R nephrostomy pouch now resolving. Urinalysis is positive for leuk esterase and nitrates. Hematuria differential includes lysed clot vs UTI vs nephrostomy tube displacement.

## 2025-07-10 NOTE — DISCHARGE NOTE PROVIDER - NSDCCPTREATMENT_GEN_ALL_CORE_FT
PRINCIPAL PROCEDURE  Procedure: Abdomen CT  Findings and Treatment: IMPRESSION:  *  Right nephrostomy catheter and left nephroureteral catheter in place.  *  Lobulated hyperdensity within and expanding the right renal collecting   system extending to the ureteropelvic junction without clear evidence for   enhancement, suggestive of blood clot. Advise continued follow-up to   ensure resolution and to exclude underlying neoplasm.  *  Mild diffuse urothelial thickening of the right ureter, which can be   seen with inflammation or infection. Correlate with urinalysis.

## 2025-07-10 NOTE — PROGRESS NOTE ADULT - PROBLEM SELECTOR PLAN 2
Hemoglobin today: 7.4  CT angio from today demonstrating no active extravasation or pseudoaneurysm.  No acute intervention indicated per urology, patient can follow up outpatient.  No acute intervention per IR. recommend monitor output for now. The blood tinged output should clear within a few days.   S/p transfusion 1u PRBC (7/9)    Plan:  - Continue to flush with 5ccs of normal saline BID  - Continue oxybutynin 5mg Q8 hrs  - Per IR [id number] output for now Hemoglobin  7.4 on 7/9  CT angio from today demonstrating no active extravasation or pseudoaneurysm.  No acute intervention indicated per urology, patient can follow up outpatient.  No acute intervention per IR. recommend monitor output for now. The blood tinged output should clear within a few days.   S/p transfusion 1u PRBC (7/9) with subsequent improvement in hgb     Plan:  - Continue to flush with 5ccs of normal saline BID  - Continue oxybutynin 5mg Q8 hrs  - Per IR monitor output for now

## 2025-07-10 NOTE — PROGRESS NOTE ADULT - PROBLEM SELECTOR PLAN 1
UA: LE: large, Nitrite +, , RBC 5424, Bacteria: few  Per wife sample taken from nephrostomy bag not tube-    Urology consulted: "No acute urologic surgical intervention indicated at this time "  Clean catch returned normal jonny  Will continue with short course of IV antibiotics while patients impatient due to patient's previously positive UTI    Plan:  - Zosyn --> cefepime (7/10) per emperic guidelines   - not septic, continue to reassess  - if MDR organism noted (or if clinically decompensates), will obtain ID assistance.mayito UA: LE: large, Nitrite +, , RBC 5424, Bacteria: few  Per wife sample taken from nephrostomy bag not tube-    Urology consulted: "No acute urologic surgical intervention indicated at this time "  Clean catch returned normal jonny  Will continue with short course of IV antibiotics while patients impatient due to patient's previously positive UTI    Plan:  - Zosyn --> cefepime (7/10) per prior culture data  - anticipate short course   - patient not septic, continue to reassess  - if MDR organism noted (or if clinically decompensates), will obtain ID assistance

## 2025-07-10 NOTE — DISCHARGE NOTE PROVIDER - HOSPITAL COURSE
HPI:  59 yr old male with a pmh of HTN, HLD, HIV on Biktarvy, prostate cancer, anal cancer status post chemotherapy and radiation complicated by bilateral nephrostomy tubes secondary to urological damage, presenting with sudden onset abdominal pain and right flank pain that is sharp, constant and 10/10. He was recently discharged from the hospital on June 25 and completed penicillin G for secondary syphilis this past week. He also reports hematuria from the right nephrostomy that started around the same time. He is endorsing a feeling as if something is stuck in his esophagus when he eats and "feels it going down". Reports lightheadedness with ambulation. Denies  headache, dizziness, chest pain, palpitations, SOB,  joint pain, diarrhea/constipation.    Hospital course:  In ED Vitals: T 98.7, HR 67, BP 78/60-> 113/73, RR 16 saturating at 96% RA. Patient received NS 2L with NS 100ml/hr for 1L. Home metoprolol XL 100mg daily and diltiazem 240mg daily were held in setting of hypotension. Urinalysis was positive for leukocyte esterase and nitrates, started on Zosyn, later switched to cefepime per culture data. Patient also started on oxybutynin 5mg Q8 hrs for bladder spasms and urine output was monitored. Patient is s/p 1u PRBC for symptomatic anemia with subsequent improvement in hgb levels. Patient also had improvement in nephrostomy bag urine output with no serosanguinous fluid day of discharge.     Important Medication Changes and Reason:  Hydromorphone 2mg every 4 hours for 3 days (pain)    Active or Pending Issues Requiring Follow-up  Hematuria (urology)    Advanced Directives:   [X] Full code  [ ] DNR  [ ] Hospice    Discharge Diagnoses:  Hematuria     On day of discharge, patient is clinically stable with no new exam findings or acute symptoms compared to prior. The patient was seen by the attending physician on the date of discharge and deemed stable and acceptable for discharge. The patient's chronic medical conditions were treated accordingly per the patient's home medication regimen. The patient's medication reconciliation (with changes made to chronic medications), follow up appointments, discharge orders, instructions, and significant lab and diagnostic studies are as noted.    Patient will be discharged to home with close follow up.   HPI:  59 yr old male with a pmh of HTN, HLD, HIV on Biktarvy, prostate cancer, anal cancer status post chemotherapy and radiation complicated by bilateral nephrostomy tubes secondary to urological damage, presenting with sudden onset abdominal pain and right flank pain that is sharp, constant and 10/10. He was recently discharged from the hospital on June 25 and completed penicillin G for secondary syphilis this past week. He also reports hematuria from the right nephrostomy that started around the same time. He is endorsing a feeling as if something is stuck in his esophagus when he eats and "feels it going down". Reports lightheadedness with ambulation. Denies  headache, dizziness, chest pain, palpitations, SOB,  joint pain, diarrhea/constipation.    Hospital course:  In ED Vitals: T 98.7, HR 67, BP 78/60-> 113/73, RR 16 saturating at 96% RA. Patient received NS 2L with NS 100ml/hr for 1L. Home metoprolol XL 100mg daily and diltiazem 240mg daily were held in setting of hypotension. Urinalysis was positive for leukocyte esterase and nitrates (though patient not septic, without signs of systemic infection), started on Zosyn, later switched to cefepime per prior culture data, completed short course of empiric coverage, cultures this admission were negative. Patient also started on oxybutynin 5mg Q8 hrs for bladder spasms and urine output was monitored. Patient is s/p 1u PRBC for symptomatic anemia with subsequent improvement in hgb levels. Patient also had improvement in nephrostomy bag urine output with no serosanguinous fluid day of discharge.     Important Medication Changes and Reason:  Hydromorphone 2mg every 4 hours for 3 days (pain)    Active or Pending Issues Requiring Follow-up  Hematuria (urology)    Advanced Directives:   [X] Full code  [ ] DNR  [ ] Hospice    Discharge Diagnoses:  Acute blood loss anemia secondary to Hematuria   B/l nephrostomy tubes present  Chronic pain  HIV     On day of discharge, patient is clinically stable with no new exam findings or acute symptoms compared to prior. The patient was seen by the attending physician on the date of discharge and deemed stable and acceptable for discharge. The patient's chronic medical conditions were treated accordingly per the patient's home medication regimen. The patient's medication reconciliation (with changes made to chronic medications), follow up appointments, discharge orders, instructions, and significant lab and diagnostic studies are as noted.    Patient will be discharged to home with close follow up.

## 2025-07-10 NOTE — DISCHARGE NOTE PROVIDER - NSFOLLOWUPCLINICS_GEN_ALL_ED_FT
[facility]  Urology  [address] [place] [address]  Phone: [phone]  Fax:   Follow Up Time: 1 month

## 2025-07-11 ENCOUNTER — TRANSCRIPTION ENCOUNTER (OUTPATIENT)
Age: 60
End: 2025-07-11

## 2025-07-11 ENCOUNTER — NON-APPOINTMENT (OUTPATIENT)
Age: 60
End: 2025-07-11

## 2025-07-11 VITALS
DIASTOLIC BLOOD PRESSURE: 90 MMHG | SYSTOLIC BLOOD PRESSURE: 124 MMHG | HEART RATE: 79 BPM | RESPIRATION RATE: 18 BRPM | TEMPERATURE: 99 F | OXYGEN SATURATION: 95 %

## 2025-07-11 PROBLEM — A51.49: Chronic | Status: ACTIVE | Noted: 2025-07-08

## 2025-07-11 LAB
ALBUMIN SERPL ELPH-MCNC: 4.2 G/DL — SIGNIFICANT CHANGE UP (ref 3.3–5)
ALP SERPL-CCNC: 85 U/L — SIGNIFICANT CHANGE UP (ref 40–120)
ALT FLD-CCNC: 9 U/L — SIGNIFICANT CHANGE UP (ref 4–41)
ANION GAP SERPL CALC-SCNC: 14 MMOL/L — SIGNIFICANT CHANGE UP (ref 7–14)
AST SERPL-CCNC: 12 U/L — SIGNIFICANT CHANGE UP (ref 4–40)
BASOPHILS # BLD AUTO: 0.03 K/UL — SIGNIFICANT CHANGE UP (ref 0–0.2)
BASOPHILS NFR BLD AUTO: 0.5 % — SIGNIFICANT CHANGE UP (ref 0–2)
BILIRUB SERPL-MCNC: 0.3 MG/DL — SIGNIFICANT CHANGE UP (ref 0.2–1.2)
BLD GP AB SCN SERPL QL: NEGATIVE — SIGNIFICANT CHANGE UP
BUN SERPL-MCNC: 8 MG/DL — SIGNIFICANT CHANGE UP (ref 7–23)
CALCIUM SERPL-MCNC: 9.6 MG/DL — SIGNIFICANT CHANGE UP (ref 8.4–10.5)
CHLORIDE SERPL-SCNC: 104 MMOL/L — SIGNIFICANT CHANGE UP (ref 98–107)
CO2 SERPL-SCNC: 21 MMOL/L — LOW (ref 22–31)
CREAT SERPL-MCNC: 0.84 MG/DL — SIGNIFICANT CHANGE UP (ref 0.5–1.3)
EGFR: 100 ML/MIN/1.73M2 — SIGNIFICANT CHANGE UP
EGFR: 100 ML/MIN/1.73M2 — SIGNIFICANT CHANGE UP
EOSINOPHIL # BLD AUTO: 0.11 K/UL — SIGNIFICANT CHANGE UP (ref 0–0.5)
EOSINOPHIL NFR BLD AUTO: 1.7 % — SIGNIFICANT CHANGE UP (ref 0–6)
GLUCOSE SERPL-MCNC: 91 MG/DL — SIGNIFICANT CHANGE UP (ref 70–99)
HCT VFR BLD CALC: 34.4 % — LOW (ref 39–50)
HGB BLD-MCNC: 10.7 G/DL — LOW (ref 13–17)
IMM GRANULOCYTES # BLD AUTO: 0.05 K/UL — SIGNIFICANT CHANGE UP (ref 0–0.07)
IMM GRANULOCYTES NFR BLD AUTO: 0.8 % — SIGNIFICANT CHANGE UP (ref 0–0.9)
LYMPHOCYTES # BLD AUTO: 1.99 K/UL — SIGNIFICANT CHANGE UP (ref 1–3.3)
LYMPHOCYTES NFR BLD AUTO: 29.9 % — SIGNIFICANT CHANGE UP (ref 13–44)
MAGNESIUM SERPL-MCNC: 2.2 MG/DL — SIGNIFICANT CHANGE UP (ref 1.6–2.6)
MCHC RBC-ENTMCNC: 28.5 PG — SIGNIFICANT CHANGE UP (ref 27–34)
MCHC RBC-ENTMCNC: 31.1 G/DL — LOW (ref 32–36)
MCV RBC AUTO: 91.5 FL — SIGNIFICANT CHANGE UP (ref 80–100)
MONOCYTES # BLD AUTO: 0.77 K/UL — SIGNIFICANT CHANGE UP (ref 0–0.9)
MONOCYTES NFR BLD AUTO: 11.6 % — SIGNIFICANT CHANGE UP (ref 2–14)
NEUTROPHILS # BLD AUTO: 3.71 K/UL — SIGNIFICANT CHANGE UP (ref 1.8–7.4)
NEUTROPHILS NFR BLD AUTO: 55.5 % — SIGNIFICANT CHANGE UP (ref 43–77)
NRBC # BLD AUTO: 0 K/UL — SIGNIFICANT CHANGE UP (ref 0–0)
NRBC # FLD: 0 K/UL — SIGNIFICANT CHANGE UP (ref 0–0)
NRBC BLD AUTO-RTO: 0 /100 WBCS — SIGNIFICANT CHANGE UP (ref 0–0)
PHOSPHATE SERPL-MCNC: 3.5 MG/DL — SIGNIFICANT CHANGE UP (ref 2.5–4.5)
PLATELET # BLD AUTO: 305 K/UL — SIGNIFICANT CHANGE UP (ref 150–400)
PMV BLD: 9.8 FL — SIGNIFICANT CHANGE UP (ref 7–13)
POTASSIUM SERPL-MCNC: 4 MMOL/L — SIGNIFICANT CHANGE UP (ref 3.5–5.3)
POTASSIUM SERPL-SCNC: 4 MMOL/L — SIGNIFICANT CHANGE UP (ref 3.5–5.3)
PROT SERPL-MCNC: 7.3 G/DL — SIGNIFICANT CHANGE UP (ref 6–8.3)
RBC # BLD: 3.76 M/UL — LOW (ref 4.2–5.8)
RBC # FLD: 17 % — HIGH (ref 10.3–14.5)
RH IG SCN BLD-IMP: NEGATIVE — SIGNIFICANT CHANGE UP
SODIUM SERPL-SCNC: 139 MMOL/L — SIGNIFICANT CHANGE UP (ref 135–145)
WBC # BLD: 6.66 K/UL — SIGNIFICANT CHANGE UP (ref 3.8–10.5)
WBC # FLD AUTO: 6.66 K/UL — SIGNIFICANT CHANGE UP (ref 3.8–10.5)

## 2025-07-11 PROCEDURE — 99239 HOSP IP/OBS DSCHRG MGMT >30: CPT | Mod: GC

## 2025-07-11 RX ORDER — HYDROMORPHONE/SOD CHLOR,ISO/PF 2 MG/10 ML
1 SYRINGE (ML) INJECTION
Qty: 0 | Refills: 0 | DISCHARGE
Start: 2025-07-11

## 2025-07-11 RX ORDER — OXYBUTYNIN CHLORIDE 5 MG/1
1 TABLET, FILM COATED, EXTENDED RELEASE ORAL
Qty: 15 | Refills: 0
Start: 2025-07-11 | End: 2025-07-15

## 2025-07-11 RX ORDER — HYDROMORPHONE/SOD CHLOR,ISO/PF 2 MG/10 ML
1 SYRINGE (ML) INJECTION
Qty: 18 | Refills: 0
Start: 2025-07-11 | End: 2025-07-13

## 2025-07-11 RX ORDER — NALOXONE HYDROCHLORIDE 0.4 MG/ML
1 INJECTION, SOLUTION INTRAMUSCULAR; INTRAVENOUS; SUBCUTANEOUS
Qty: 1 | Refills: 0
Start: 2025-07-11

## 2025-07-11 RX ORDER — SENNA 187 MG
2 TABLET ORAL
Qty: 0 | Refills: 0 | DISCHARGE
Start: 2025-07-11

## 2025-07-11 RX ORDER — POLYETHYLENE GLYCOL 3350 17 G/17G
17 POWDER, FOR SOLUTION ORAL
Qty: 0 | Refills: 0 | DISCHARGE
Start: 2025-07-11

## 2025-07-11 RX ORDER — CLONAZEPAM 0.5 MG/1
1 TABLET ORAL
Qty: 0 | Refills: 0 | DISCHARGE
Start: 2025-07-11

## 2025-07-11 RX ORDER — HYDROMORPHONE/SOD CHLOR,ISO/PF 2 MG/10 ML
1 SYRINGE (ML) INJECTION
Qty: 12 | Refills: 0
Start: 2025-07-11 | End: 2025-07-13

## 2025-07-11 RX ADMIN — BICTEGRAVIR SODIUM, EMTRICITABINE, AND TENOFOVIR ALAFENAMIDE FUMARATE 1 TABLET(S): 50; 200; 25 TABLET ORAL at 12:24

## 2025-07-11 RX ADMIN — BUPROPION HYDROBROMIDE 150 MILLIGRAM(S): 522 TABLET, EXTENDED RELEASE ORAL at 12:24

## 2025-07-11 RX ADMIN — CEFEPIME 100 MILLIGRAM(S): 2 INJECTION, POWDER, FOR SOLUTION INTRAVENOUS at 06:02

## 2025-07-11 RX ADMIN — Medication 1 APPLICATION(S): at 06:02

## 2025-07-11 RX ADMIN — OXYBUTYNIN CHLORIDE 5 MILLIGRAM(S): 5 TABLET, FILM COATED, EXTENDED RELEASE ORAL at 13:08

## 2025-07-11 RX ADMIN — OXYBUTYNIN CHLORIDE 5 MILLIGRAM(S): 5 TABLET, FILM COATED, EXTENDED RELEASE ORAL at 06:02

## 2025-07-11 RX ADMIN — Medication 4 MILLIGRAM(S): at 07:59

## 2025-07-11 NOTE — PROGRESS NOTE ADULT - PROBLEM SELECTOR PLAN 3
Pt reports intermittent dysphagia 2 days ago.  Speech eval preformed    Plan:  Regular diet and clear liquids per speech.

## 2025-07-11 NOTE — DISCHARGE NOTE NURSING/CASE MANAGEMENT/SOCIAL WORK - NSDCPEFALRISK_GEN_ALL_CORE
For information on Fall & Injury Prevention, visit: [email or web address] OR  [email or web address] OR  https://www.cdc.gov/steadi/patient.html

## 2025-07-11 NOTE — PROGRESS NOTE ADULT - PROBLEM SELECTOR PLAN 1
UA: LE: large, Nitrite +, , RBC 5424, Bacteria: few  Per wife sample taken from nephrostomy bag not tube-    Urology consulted: "No acute urologic surgical intervention indicated at this time "  Clean catch returned normal jonny  Will continue with short course of IV antibiotics while patients impatient due to patient's previously positive UTI    Plan:  - Zosyn --> cefepime (7/10) per prior culture data  - anticipate short course   - patient not septic, continue to reassess  - if MDR organism noted (or if clinically decompensates), will obtain ID assistance UA: LE: large, Nitrite +, , RBC 5424, Bacteria: few  Per wife sample taken from nephrostomy bag not tube-    Urology consulted: "No acute urologic surgical intervention indicated at this time "  Clean catch returned normal jonny  Will continue with short course of IV antibiotics while patients impatient due to patient's previously positive UTI    Plan:  - Zosyn --> cefepime (7/10) per prior culture data  - stop IV antibiotics prior to discharge  - patient not septic, continue to reassess  - if MDR organism noted (or if clinically decompensates), will obtain ID assistance UA: LE: large, Nitrite +, , RBC 5424, Bacteria: few  Per wife sample taken from nephrostomy bag not tube-    Urology consulted: "No acute urologic surgical intervention indicated at this time "  Clean catch returned normal jonny  Will continue with short course of IV antibiotics while patients impatient due to patient's previously positive UTI    Plan:  - Zosyn --> cefepime (7/11) per prior culture data  - stop IV antibiotics prior to discharge  - patient not septic, completed a short course of antibiotics, no need to continue antibiotics upon discharge

## 2025-07-11 NOTE — PROGRESS NOTE ADULT - ASSESSMENT
59 yr old male with a pmh of HTN, HLD, HIV on Biktarvy, prostate cancer, anal cancer s/p chemotherapy and radiation complicated by b/l nephrostomy tubes secondary to urological damage and an ostomy, hernia repair with mesh placement in May recently discharged from the hospital on June 25 and completed penicillin G for secondary syphilis this past week.. Pt presents with sudden onset abdominal pain and right flank pain that is sharp, constant and 10/10. Patient history is notable for hematuria, dizziness and weakness when ambulating since June. PE is notable for pallor, and serosanguinous fluid in R nephrostomy pouch now resolving. Urinalysis is positive for leuk esterase and nitrates. Hematuria differential includes lysed clot vs UTI vs nephrostomy tube displacement.   60 yr old male with a pmh of HTN, HLD, HIV on Biktarvy, prostate cancer, anal cancer s/p chemotherapy and radiation complicated by b/l nephrostomy tubes secondary to urological damage and an ostomy, hernia repair with mesh placement in May recently discharged from the hospital on June 25 and completed penicillin G for secondary syphilis this past week.. Pt presents with sudden onset abdominal pain and right flank pain that is sharp, constant and 10/10. Patient history is notable for hematuria, dizziness and weakness when ambulating since June. PE is notable for pallor, and serosanguinous fluid in R nephrostomy pouch now resolving. Urinalysis is positive for leuk esterase and nitrates. Hematuria differential includes lysed clot vs UTI vs nephrostomy tube displacement.  R ouput color lightening, hgb stable, desiring to go home today.

## 2025-07-11 NOTE — PROGRESS NOTE ADULT - PROBLEM SELECTOR PLAN 4
Presented with BP 78/60  Repeat BP readings improved    Plan:  - Hold home metoprolol XL 100mg daily and diltiazem 240mg daily  - monitor BP and determine if/when should resume (pending stability) Presented with BP 78/60  Repeat BP readings improved    Plan:  - Hold home metoprolol XL 100mg daily and diltiazem 240mg daily  - monitor BP and determine if/when should resume  - SBP 120s currently, if restarting meds would favor slow reintroduction and outpatient following for continued titration

## 2025-07-11 NOTE — DISCHARGE NOTE NURSING/CASE MANAGEMENT/SOCIAL WORK - FINANCIAL ASSISTANCE
[facility] provides services at a reduced cost to those who are determined to be eligible through [facility]’s financial assistance program. Information regarding [facility]’s financial assistance program can be found by going to [email or web address] or by calling [phone].

## 2025-07-11 NOTE — DISCHARGE NOTE NURSING/CASE MANAGEMENT/SOCIAL WORK - PATIENT PORTAL LINK FT
You can access the FollowMyHealth Patient Portal offered by [facility] by registering at the following website: [email or web address]. By joining [employer]’s FollowMyHealth portal, you will also be able to view your health information using other applications (apps) compatible with our system.

## 2025-07-11 NOTE — DISCHARGE NOTE NURSING/CASE MANAGEMENT/SOCIAL WORK - NSDCFUADDAPPT_GEN_ALL_CORE_FT
APPTS ARE READY TO BE MADE: [X] YES    Best Family or Patient Contact (if needed):    Additional Information about above appointments (if needed):    1: Urology  2: PCP  3:     Other comments or requests:

## 2025-07-11 NOTE — DISCHARGE NOTE NURSING/CASE MANAGEMENT/SOCIAL WORK - NSDPACMPNY_GEN_ALL_CORE
Physical Therapy  Visit Type: initial evaluation  Co-treat with: Occupational therapist  Precautions:  Medical precautions:  fall risk; standard precautions.    Lines:     Basic: IV and O2 (2.5L )      Lines in chart and on patient reviewed, cautions maintained throughout session.  Hearing: hard of hearing, wears hearing aids left and wears hearing aids right  Vision:     Current vision: wears glasses only for reading  Safety Measures: bed alarm, chair alarm and bed rails      SUBJECTIVE                                                                                                            Patient agreed to participate in therapy this date.  \"my nose is running\"  Patient / Family Goal: unable to state      OBJECTIVE                                                                                                              Level of consciousness: alert    Oriented to person and place     Arousal alertness: appropriate responses to stimuli    Affect/Behavior: alert and pleasant  Patient activity tolerance: 1 to 1 activity to rest  Bed Mobility:      Supine to sit: supervision (needs significant extra time and use of rails)  Training completed:    Tasks: supine to sit    Education details: body mechanics and patient requires additional training  Transfers:    Assistive devices: 2-wheeled walker and gait belt    Sit to stand: minimal assist    Stand to sit: minimal assist    Stand pivot: minimal assist  Training completed:    Tasks: sit to stand, stand to sit and stand pivot    Education details: body mechanics, patient safety and patient requires additional training    Min assist for anterior weight shift due to weakness  Gait/Ambulation:     Assistance: minimal assist   Assistive device: 2-wheeled walker and gait belt    Distance (ft): 20    Pattern: shuffle  Training Completed:    Tasks: gait training on level surfaces    Education details: patient safety, body mechanics and patient requires additional training     Min assist for safety, has slight lean to the left  Stair Mobility:    Training completed:      Continue to assess, has stairs to enter home        Interventions                                                                                                       Verbal Consent: Writer verbally educated and received verbal consent for hand placement, positioning of patient, and techniques to be performed today from patient for clothing adjustments for techniques, hand placement and palpation for techniques and therapist position for techniques as described above and how they are pertinent to the patient's plan of care.        ASSESSMENT                                                                                                                Impairments: range of motion, strength, balance deficits, safety awareness, pain, activity tolerance, shortness of breath and endurance  Functional Limitations: all functional mobility  Patient seen on med--surg nursing unit.  Patient presents below baseline which was modified independent with mobility.  Pt lives with his spouse in a ranch home with steps to enter.  He uses a WW for mobility. He has a history of Parkinsons.  He is admitted with weakness.  He is currently on 2.5L O2 and does not typically require O2.  For safe return to prior living situation the patient needs to be at a modified independent  level for mobility.      The patient now presents with impairments in activity tolerance, balance, cognitive changes, limited knowledge of compensatory strategies, postural control, ROM, safety awareness and strength which impact safe and effective performance in bed mobility, transfers, ambulation and stair negotiation.  Patient is current functioning at minimal assist  for mobility.  Pt required min assist for anterior weight shift to stand.  He demonstrates left lean and significant shuffled gait.  He moves very slowly.  He is currently unsafe to return home and  recommend RAFA.       Discharge Recommendations  Recommendation for Discharge: PT WI: Sub-acute nursing home             PT/OT Mobility Equipment for Discharge: has a 2ww   PT/OT ADL Equipment for Discharge: continue to assess  PT Identified Barriers to Discharge: fall risk, weakness     Skilled therapy is required to address these limitations in attempt to maximize the patient's independence.  Predicted patient presentation: Moderate (evolving) - Patient comorbidities and complexities, as defined above, may have varying impact on steady progress for prescribed plan of care.      End of Session:   Location: in bed  Safety measures: call light within reach, alarm system in place/re-engaged and lines intact  Handoff to: nurse            PLAN                                                                                                                            Suggestions for next session as indicated: Pending pt presentation and ability next session will work on gait training and strengthening    PT Frequency: Once a day, 5 days/week     Frequency Comments: 0/5 by 9/1     Interventions: balance, bed mobility, gait training, neuromuscular re-education, ROM, strengthening, safety education, patient/family training, HEP train/position, functional transfer training, stairs retraining, body mechanics, compensatory technique education, energy conservation, equipment eval/education, continued evaluation and endurance training  Agreement to plan and goals: patient agrees with goals and treatment plan        GOALS:  Long Term Goals: (to be met by time of discharge from hospital)  Sit to supine: Patient will complete sit to supine modified independent.  Supine to sit: Patient will complete supine to sit modified independent.  Sit to stand: Patient will complete sit to stand transfer with 2-wheeled walker, supervision.   Stand to sit: Patient will complete stand to sit transfer with 2-wheeled walker, supervision.   Stand  pivot: Patient will complete stand pivot transfer with 2-wheeled walker, supervision.   Ambulation (even): Patient will ambulate on even surface for 50 feet with 2-wheeled walker, supervision.   3-4 steps: Patient will ambulate 3-4 steps with using 2 rails, supervision.       Documented in the chart in the following areas: Prior Level of Function. Pain. Assessment. Patient Education.           Other (Specify)

## 2025-07-11 NOTE — PROGRESS NOTE ADULT - PROBLEM SELECTOR PLAN 6
Chronic HIV infection   December CD4 624    Plan:  - Continue Biktarvy

## 2025-07-11 NOTE — PROGRESS NOTE ADULT - REASON FOR ADMISSION
UTI, hematuria from right nephrostomy, hypotension

## 2025-07-11 NOTE — PROGRESS NOTE ADULT - SUBJECTIVE AND OBJECTIVE BOX
Progress Note  Authored by:   Lydia Good MD   Internal medicine PGY 1  07-08-25 (3d)    Patient is a 60y old  Male who presents with a chief complaint of UTI, hematuria from right nephrostomy, hypotension (10 Jul 2025 12:57)      Subjective / Overnight Events :  - No acute events overnight.  - Pt seen and examined at bedside.     Additional ROS (if any):        MEDICATIONS  (STANDING):  bictegravir 50 mG/emtricitabine 200 mG/tenofovir alafenamide 25 mG (BIKTARVY) 1 Tablet(s) Oral daily  buPROPion XL (24-Hour) . 150 milliGRAM(s) Oral daily  cefepime   IVPB      cefepime   IVPB 1000 milliGRAM(s) IV Intermittent every 12 hours  chlorhexidine 2% Cloths 1 Application(s) Topical <User Schedule>  ezetimibe 10 milliGRAM(s) Oral at bedtime  oxybutynin 5 milliGRAM(s) Oral every 8 hours  sodium chloride 0.9%. 1000 milliLiter(s) (75 mL/Hr) IV Continuous <Continuous>  traZODone 50 milliGRAM(s) Oral at bedtime    MEDICATIONS  (PRN):  acetaminophen     Tablet .. 650 milliGRAM(s) Oral every 6 hours PRN Temp greater or equal to 38C (100.4F), Mild Pain (1 - 3)  clonazePAM  Tablet 1 milliGRAM(s) Oral two times a day PRN for anxiety  HYDROmorphone   Tablet 2 milliGRAM(s) Oral every 4 hours PRN Moderate Pain (4 - 6)  HYDROmorphone   Tablet 4 milliGRAM(s) Oral every 4 hours PRN Severe Pain (7 - 10)  melatonin 3 milliGRAM(s) Oral at bedtime PRN Insomnia  ondansetron Injectable 4 milliGRAM(s) IV Push every 8 hours PRN Nausea and/or Vomiting  oxyCODONE    IR 2.5 milliGRAM(s) Oral every 4 hours PRN Moderate Pain (4 - 6)  oxyCODONE    IR 5 milliGRAM(s) Oral every 4 hours PRN Severe Pain (7 - 10)  polyethylene glycol 3350 17 Gram(s) Oral daily PRN for constipation  polyethylene glycol 3350 17 Gram(s) Oral daily PRN Constipation  senna 2 Tablet(s) Oral at bedtime PRN for constipation  zolpidem 5 milliGRAM(s) Oral at bedtime PRN Insomnia  zolpidem 5 milliGRAM(s) Oral at bedtime PRN Insomnia          PHYSICAL EXAM:  Vital Signs Last 24 Hrs  T(C): 36.3 (11 Jul 2025 05:09), Max: 36.7 (10 Jul 2025 13:01)  T(F): 97.4 (11 Jul 2025 05:09), Max: 98.1 (10 Jul 2025 13:01)  HR: 74 (11 Jul 2025 05:09) (68 - 75)  BP: 118/87 (11 Jul 2025 05:09) (118/87 - 144/90)  BP(mean): --  RR: 18 (11 Jul 2025 05:09) (17 - 18)  SpO2: 97% (11 Jul 2025 05:09) (96% - 98%)    Parameters below as of 11 Jul 2025 05:09  Patient On (Oxygen Delivery Method): room air        I&O's Summary    10 Jul 2025 07:01  -  11 Jul 2025 07:00  --------------------------------------------------------  IN: 440 mL / OUT: 2000 mL / NET: -1560 mL          General: NAD, non-toxic appearing   HEENT: EOMi, no scleral icterus  CV: RRR, normal S1 and S2, no m/r/g  Lungs: normal respiratory effort. CTAB, no wheezes, rales, or rhonchi  Abd: soft, nontender, nondistended  Ext: no edema, 2+ peripheral pulses   Pysch: AOx4, appropriate affect   Neuro: grossly non-focal, moving all extremities spontaneously   Skin: no rashes or lesions     LABS:  CAPILLARY BLOOD GLUCOSE                                  9.6    6.22  )-----------( 275      ( 10 Jul 2025 06:00 )             28.7       WBC Trend: 6.22<--, 6.45<--, 3.95<--  Hb Trend: 9.6<--, 9.2<--, 7.4<--, 10.0<--    07-10    141  |  106  |  8   ----------------------------<  103[H]  3.7   |  22  |  0.91    Ca    9.3      10 Jul 2025 06:00  Phos  2.9     07-10  Mg     2.10     07-10    TPro  6.8  /  Alb  4.1  /  TBili  0.4  /  DBili  x   /  AST  11  /  ALT  8   /  AlkPhos  74  07-10          Urinalysis Basic - ( 10 Jul 2025 06:00 )    Color: x / Appearance: x / SG: x / pH: x  Gluc: 103 mg/dL / Ketone: x  / Bili: x / Urobili: x   Blood: x / Protein: x / Nitrite: x   Leuk Esterase: x / RBC: x / WBC x   Sq Epi: x / Non Sq Epi: x / Bacteria: x        Urinalysis with Rflx Culture (collected 08 Jul 2025 10:41)    Culture - Urine (collected 08 Jul 2025 10:41)  Source: Clean Catch  Final Report (09 Jul 2025 14:54):    <10,000 CFU/mL Normal Urogenital Genet    Culture - Blood (collected 08 Jul 2025 09:50)  Source: Blood Blood-Peripheral  Preliminary Report (10 Jul 2025 17:02):    No growth at 48 Hours    Culture - Blood (collected 08 Jul 2025 09:33)  Source: Blood Blood-Peripheral  Preliminary Report (10 Jul 2025 13:01):    No growth at 48 Hours          RADIOLOGY & ADDITIONAL TESTS: Reviewed   Progress Note  Authored by:   Lydia Good MD   Internal medicine PGY 1  07-08-25 (3d)    Patient is a 60y old  Male who presents with a chief complaint of UTI, hematuria from right nephrostomy, hypotension (10 Jul 2025 12:57)      Subjective / Overnight Events :  - No acute events overnight.  - Pt seen and examined at bedside. He reports no abdominal pain, no chest pain, no flank/back pain, no blood in nephrostomy bag.       MEDICATIONS  (STANDING):  bictegravir 50 mG/emtricitabine 200 mG/tenofovir alafenamide 25 mG (BIKTARVY) 1 Tablet(s) Oral daily  buPROPion XL (24-Hour) . 150 milliGRAM(s) Oral daily  cefepime   IVPB      cefepime   IVPB 1000 milliGRAM(s) IV Intermittent every 12 hours  chlorhexidine 2% Cloths 1 Application(s) Topical <User Schedule>  ezetimibe 10 milliGRAM(s) Oral at bedtime  oxybutynin 5 milliGRAM(s) Oral every 8 hours  sodium chloride 0.9%. 1000 milliLiter(s) (75 mL/Hr) IV Continuous <Continuous>  traZODone 50 milliGRAM(s) Oral at bedtime    MEDICATIONS  (PRN):  acetaminophen     Tablet .. 650 milliGRAM(s) Oral every 6 hours PRN Temp greater or equal to 38C (100.4F), Mild Pain (1 - 3)  clonazePAM  Tablet 1 milliGRAM(s) Oral two times a day PRN for anxiety  HYDROmorphone   Tablet 2 milliGRAM(s) Oral every 4 hours PRN Moderate Pain (4 - 6)  HYDROmorphone   Tablet 4 milliGRAM(s) Oral every 4 hours PRN Severe Pain (7 - 10)  melatonin 3 milliGRAM(s) Oral at bedtime PRN Insomnia  ondansetron Injectable 4 milliGRAM(s) IV Push every 8 hours PRN Nausea and/or Vomiting  oxyCODONE    IR 2.5 milliGRAM(s) Oral every 4 hours PRN Moderate Pain (4 - 6)  oxyCODONE    IR 5 milliGRAM(s) Oral every 4 hours PRN Severe Pain (7 - 10)  polyethylene glycol 3350 17 Gram(s) Oral daily PRN for constipation  polyethylene glycol 3350 17 Gram(s) Oral daily PRN Constipation  senna 2 Tablet(s) Oral at bedtime PRN for constipation  zolpidem 5 milliGRAM(s) Oral at bedtime PRN Insomnia  zolpidem 5 milliGRAM(s) Oral at bedtime PRN Insomnia          PHYSICAL EXAM:  Vital Signs Last 24 Hrs  T(C): 36.3 (11 Jul 2025 05:09), Max: 36.7 (10 Jul 2025 13:01)  T(F): 97.4 (11 Jul 2025 05:09), Max: 98.1 (10 Jul 2025 13:01)  HR: 74 (11 Jul 2025 05:09) (68 - 75)  BP: 118/87 (11 Jul 2025 05:09) (118/87 - 144/90)  BP(mean): --  RR: 18 (11 Jul 2025 05:09) (17 - 18)  SpO2: 97% (11 Jul 2025 05:09) (96% - 98%)    Parameters below as of 11 Jul 2025 05:09  Patient On (Oxygen Delivery Method): room air        I&O's Summary    10 Jul 2025 07:01  -  11 Jul 2025 07:00  --------------------------------------------------------  IN: 440 mL / OUT: 2000 mL / NET: -1560 mL          General: NAD, non-toxic appearing   HEENT: EOMi, no scleral icterus  CV: RRR, normal S1 and S2, no m/r/g  Lungs: normal respiratory effort. CTAB, no wheezes, rales, or rhonchi  Abd: soft, nontender, nondistended  Ext: no edema, 2+ peripheral pulses   Neuro: grossly non-focal, moving all extremities spontaneously     LABS:  CAPILLARY BLOOD GLUCOSE                                  9.6    6.22  )-----------( 275      ( 10 Jul 2025 06:00 )             28.7       WBC Trend: 6.22<--, 6.45<--, 3.95<--  Hb Trend: 9.6<--, 9.2<--, 7.4<--, 10.0<--    07-10    141  |  106  |  8   ----------------------------<  103[H]  3.7   |  22  |  0.91    Ca    9.3      10 Jul 2025 06:00  Phos  2.9     07-10  Mg     2.10     07-10    TPro  6.8  /  Alb  4.1  /  TBili  0.4  /  DBili  x   /  AST  11  /  ALT  8   /  AlkPhos  74  07-10          Urinalysis Basic - ( 10 Jul 2025 06:00 )    Color: x / Appearance: x / SG: x / pH: x  Gluc: 103 mg/dL / Ketone: x  / Bili: x / Urobili: x   Blood: x / Protein: x / Nitrite: x   Leuk Esterase: x / RBC: x / WBC x   Sq Epi: x / Non Sq Epi: x / Bacteria: x        Urinalysis with Rflx Culture (collected 08 Jul 2025 10:41)    Culture - Urine (collected 08 Jul 2025 10:41)  Source: Clean Catch  Final Report (09 Jul 2025 14:54):    <10,000 CFU/mL Normal Urogenital Genet    Culture - Blood (collected 08 Jul 2025 09:50)  Source: Blood Blood-Peripheral  Preliminary Report (10 Jul 2025 17:02):    No growth at 48 Hours    Culture - Blood (collected 08 Jul 2025 09:33)  Source: Blood Blood-Peripheral  Preliminary Report (10 Jul 2025 13:01):    No growth at 48 Hours          RADIOLOGY & ADDITIONAL TESTS: Reviewed   Progress Note  Authored by:   Lydia Good MD   Internal medicine PGY 1  07-08-25 (3d)    Patient is a 60y old  Male who presents with a chief complaint of UTI, hematuria from right nephrostomy, hypotension (10 Jul 2025 12:57)      Subjective / Overnight Events :  - No acute events overnight.  - Pt seen and examined at bedside. He reports no abdominal pain, no chest pain, no flank/back pain, no blood in nephrostomy bag.       MEDICATIONS  (STANDING):  bictegravir 50 mG/emtricitabine 200 mG/tenofovir alafenamide 25 mG (BIKTARVY) 1 Tablet(s) Oral daily  buPROPion XL (24-Hour) . 150 milliGRAM(s) Oral daily  cefepime   IVPB      cefepime   IVPB 1000 milliGRAM(s) IV Intermittent every 12 hours  chlorhexidine 2% Cloths 1 Application(s) Topical <User Schedule>  ezetimibe 10 milliGRAM(s) Oral at bedtime  oxybutynin 5 milliGRAM(s) Oral every 8 hours  sodium chloride 0.9%. 1000 milliLiter(s) (75 mL/Hr) IV Continuous <Continuous>  traZODone 50 milliGRAM(s) Oral at bedtime    MEDICATIONS  (PRN):  acetaminophen     Tablet .. 650 milliGRAM(s) Oral every 6 hours PRN Temp greater or equal to 38C (100.4F), Mild Pain (1 - 3)  clonazePAM  Tablet 1 milliGRAM(s) Oral two times a day PRN for anxiety  HYDROmorphone   Tablet 2 milliGRAM(s) Oral every 4 hours PRN Moderate Pain (4 - 6)  HYDROmorphone   Tablet 4 milliGRAM(s) Oral every 4 hours PRN Severe Pain (7 - 10)  melatonin 3 milliGRAM(s) Oral at bedtime PRN Insomnia  ondansetron Injectable 4 milliGRAM(s) IV Push every 8 hours PRN Nausea and/or Vomiting  oxyCODONE    IR 2.5 milliGRAM(s) Oral every 4 hours PRN Moderate Pain (4 - 6)  oxyCODONE    IR 5 milliGRAM(s) Oral every 4 hours PRN Severe Pain (7 - 10)  polyethylene glycol 3350 17 Gram(s) Oral daily PRN for constipation  polyethylene glycol 3350 17 Gram(s) Oral daily PRN Constipation  senna 2 Tablet(s) Oral at bedtime PRN for constipation  zolpidem 5 milliGRAM(s) Oral at bedtime PRN Insomnia  zolpidem 5 milliGRAM(s) Oral at bedtime PRN Insomnia          PHYSICAL EXAM:  Vital Signs Last 24 Hrs  T(C): 36.3 (11 Jul 2025 05:09), Max: 36.7 (10 Jul 2025 13:01)  T(F): 97.4 (11 Jul 2025 05:09), Max: 98.1 (10 Jul 2025 13:01)  HR: 74 (11 Jul 2025 05:09) (68 - 75)  BP: 118/87 (11 Jul 2025 05:09) (118/87 - 144/90)  BP(mean): --  RR: 18 (11 Jul 2025 05:09) (17 - 18)  SpO2: 97% (11 Jul 2025 05:09) (96% - 98%)    Parameters below as of 11 Jul 2025 05:09  Patient On (Oxygen Delivery Method): room air        I&O's Summary    10 Jul 2025 07:01  -  11 Jul 2025 07:00  --------------------------------------------------------  IN: 440 mL / OUT: 2000 mL / NET: -1560 mL          General: NAD, non-toxic appearing   HEENT: EOMi, no scleral icterus  CV: RRR, normal S1 and S2, no m/r/g  Lungs: normal respiratory effort. CTAB, no wheezes, rales, or rhonchi  Abd: soft, nontender, nondistended  Ext: no edema, 2+ peripheral pulses   Neuro: grossly non-focal, moving all extremities spontaneously   Skin: +ostomy, +b/l nephrostomy tube, R output lighter/clearer today, L with clear yellow output    LABS:  CAPILLARY BLOOD GLUCOSE                            10.7   6.66  )-----------( 305      ( 11 Jul 2025 06:15 )             34.4                               9.6    6.22  )-----------( 275      ( 10 Jul 2025 06:00 )             28.7       WBC Trend: 6.22<--, 6.45<--, 3.95<--  Hb Trend: 9.6<--, 9.2<--, 7.4<--, 10.0<--    07-11    139  |  104  |  8   ----------------------------<  91  4.0   |  21[L]  |  0.84    Ca    9.6      11 Jul 2025 06:15  Phos  3.5     07-11  Mg     2.20     07-11    TPro  7.3  /  Alb  4.2  /  TBili  0.3  /  DBili  x   /  AST  12  /  ALT  9   /  AlkPhos  85  07-11      07-10    141  |  106  |  8   ----------------------------<  103[H]  3.7   |  22  |  0.91    Ca    9.3      10 Jul 2025 06:00  Phos  2.9     07-10  Mg     2.10     07-10    TPro  6.8  /  Alb  4.1  /  TBili  0.4  /  DBili  x   /  AST  11  /  ALT  8   /  AlkPhos  74  07-10          Urinalysis Basic - ( 10 Jul 2025 06:00 )    Color: x / Appearance: x / SG: x / pH: x  Gluc: 103 mg/dL / Ketone: x  / Bili: x / Urobili: x   Blood: x / Protein: x / Nitrite: x   Leuk Esterase: x / RBC: x / WBC x   Sq Epi: x / Non Sq Epi: x / Bacteria: x        Urinalysis with Rflx Culture (collected 08 Jul 2025 10:41)    Culture - Urine (collected 08 Jul 2025 10:41)  Source: Clean Catch  Final Report (09 Jul 2025 14:54):    <10,000 CFU/mL Normal Urogenital Genet    Culture - Blood (collected 08 Jul 2025 09:50)  Source: Blood Blood-Peripheral  Preliminary Report (10 Jul 2025 17:02):    No growth at 48 Hours    Culture - Blood (collected 08 Jul 2025 09:33)  Source: Blood Blood-Peripheral  Preliminary Report (10 Jul 2025 13:01):    No growth at 48 Hours          RADIOLOGY & ADDITIONAL TESTS: Reviewed

## 2025-07-11 NOTE — PROGRESS NOTE ADULT - PROBLEM SELECTOR PLAN 2
Hemoglobin  7.4 on 7/9  CT angio from today demonstrating no active extravasation or pseudoaneurysm.  No acute intervention indicated per urology, patient can follow up outpatient.  No acute intervention per IR. recommend monitor output for now. The blood tinged output should clear within a few days.   S/p transfusion 1u PRBC (7/9) with subsequent improvement in hgb     Plan:  - Continue to flush with 5ccs of normal saline BID  - Continue oxybutynin 5mg Q8 hrs  - Per IR monitor output for now Hemoglobin  7.4 on 7/9  CT angio from today demonstrating no active extravasation or pseudoaneurysm.  No acute intervention indicated per urology, patient can follow up outpatient.  No acute intervention per IR. recommend monitor output for now. The blood tinged output should clear within a few days.   S/p transfusion 1u PRBC (7/9) with subsequent improvement in hgb     Plan:  - Continue to flush with 5ccs of normal saline BID  - Continue oxybutynin 5mg Q8 hrs  - Per IR monitor output for now (color is lighter)  - otpt urology followup  - hgb stable currently

## 2025-07-11 NOTE — DISCHARGE NOTE NURSING/CASE MANAGEMENT/SOCIAL WORK - NSDCPNINST_GEN_ALL_CORE
Maintain dry sterile dressing in place to flank nephrostomy site.  Maintain incision clean and dry, call MD with any signs of infection such as fever, redness or drainage from site.  Continue to drink plenty of fluids and avoid heavy lifting strenuous activity as well as constipation which may be caused by taking narcotic pain. Follow up with PMD for continuity of care.

## 2025-07-11 NOTE — PROGRESS NOTE ADULT - ATTENDING COMMENTS
Patient seen and examined, d/w Dr. Haney and MS4 Fidencio, agree w/ above.     58 yo M w/ HTN, HLD, HIV (on HAART), prostate and anal cancer, s/p ostomy and b/l nephrostomy tubes, chronic pain, recent hospitalization for flank pain/hematuria c/b hemorrhagica shock s/p IR angiogram and right NT exchange, now admitted with acute blood loss anemia secondary to hematuria (bloody R nephrostomy output), s/p evaluation by IR and urology, on empiric antibiotics pending cultures.     Patient reports feeling weak/tired, noting continued red output from R nephrostomy. Discussed potential role for PRBC transfusion for symptomatic anemia (hgb 7.4) and risks/benefits of transfusion (patient amenable). Discussed plan to continue monitoring hgb, monitoring nephrostomy output, pending infectious workup. Discussed continued symptomatic management of pain. Patient in agreement with plan.     Acute blood loss anemia secondary to hematuria  - hgb downtrending from 10 -> 7.4  - Red R nephrostomy output noted  - CT imaging noted, concern for lobulated hyperdensity within and expanding the right renal collecting system extending to the ureteropelvic junction suggestive of blood clot  -  per urology no acute urological intervention, per IR no active extravasation or pseudoaneurysm noted, no need for repositioning of tube, they expect blood tinged output to clear  - continue to flush NT as per IR  - transfusing pRBC for symptomatic anemia, continue to trend hgb and transfuse prn  - +urinalysis, on empiric antibiotics to cover urinary tract infection, f/u urine cultures and tailor abx as per speciation/sensitivities  - c/w oxybutynin for bladder spasms    HIV   - c/w HAART  - prior CD4 and VL noted    Dysphagia  - denies currently, swallow eval appreciated  - c/w diet     HTN  - hold BP meds fo rnoe    HLD  - on zetia  - lipid profile noted    Chronic pain  - multimodal pain management strategies  - otpt f/u for continued management  - bowel regimen to prevent opioid induced constipation    hx anxiety and depression  - on wellbutrin, trazodone and klonopin prn  - on ambien prn insomnia .    Hypokalemia  - replete K
Patient seen and examined, d/w Dr. Haney and MS4 Fidencio, agree w/ above.     60 yo M w/ HTN, HLD, HIV (on HAART), prostate and anal cancer, s/p ostomy and b/l nephrostomy tubes, chronic pain, recent hospitalization for flank pain/hematuria c/b hemorrhagica shock s/p IR angiogram and right NT exchange, now admitted with acute blood loss anemia secondary to hematuria (bloody R nephrostomy output), s/p evaluation by IR and urology, received pRBC transfusion with subsequent improvement in hgb levels.     Patient reports feeling better today, less fatigued. Likewise pain is also improved. Feels like the output from the R nephrostomy is much lighter in color (If the urine output from the L tube which is yellow in color could be described as lemonade, then the R output could be peach lemonade). Denies any fever or chills. Discussed plan to continue to monitor hgb and nephrostomy output. If remains stable and no further bleeding noted, then perhaps can consider d/c home (over next day or so) with close outpatient urology followup. Patient verbalized understanding, in agreement with plan.     Team d/w urology re: potential etiologies to hematuria, potentially a lysed clot per urology?    Acute blood loss anemia secondary to hematuria  - hgb downtrended from 10 -> 7.4 on 7/9  - received pRBC transfusion with improvement in hgb, now stable (9.6)  - continue to trend hgb and transfuse prn  - CT imaging noted, concern for lobulated hyperdensity within and expanding the right renal collecting system extending to the ureteropelvic junction suggestive of blood clot  -  per urology no acute urological intervention, per IR no active extravasation or pseudoaneurysm noted, no need for repositioning of tube, they expect blood tinged output to clear  - continue to flush NT as per IR  - R nephrostomy output appears to be lightening up on assessment today, will continue to monitor  - ? hematuria due to lysed clot per urology?  - +urinalysis, UTI could be a cause of hematuria, on empiric antibiotics to cover urinary tract infection, however no signs of systemic infection and cultures (Bcx and Ucx) negative thus far, thus would favor short course of antibiotics (and stopping soon unless urology feels longer course necessary)...   - c/w oxybutynin for bladder spasms    HIV   - c/w HAART  - prior CD4 and VL noted    Dysphagia  - denies currently, swallow eval appreciated  - c/w diet     HTN  - hold BP meds for now, monitor BP and if no further bleeding noted and BP above goal, then can consider slow reintroduction of BP meds    HLD  - on zetia  - lipid profile noted    Chronic pain  - multimodal pain management strategies  - otpt f/u for continued management  - bowel regimen to prevent opioid induced constipation    hx anxiety and depression  - on wellbutrin, trazodone and klonopin prn  - on ambien prn insomnia .
Patient seen and examined, d/w Dr. Good, agree w/ above.     59 yo M w/ HTN, HLD, HIV (on HAART), prostate and anal cancer, s/p ostomy and b/l nephrostomy tubes, chronic pain, recent hospitalization for flank pain/hematuria c/b hemorrhagica shock s/p IR angiogram and right NT exchange, admitted with acute blood loss anemia secondary to hematuria (bloody R nephrostomy output), s/p evaluation by IR and urology, received pRBC transfusion with subsequent improvement in hgb levels. Now R nephrostomy output lightening (ie not bloody), and hgb stable.     Wished patient a happy birthday. He is happy to report no further bloody output noted in R nephrostomy tube, output color lighter today compared to prior exam. Happy to hear that hgb is stable. He would like to go home today (appreciate CM/SW with supplies/services). Anticipatory guidance provided, reviewed signs/symptoms that would warrant seeking medical attention/return to hospital. Discussed need for outpatient urology followup (patient reports need new provider, can refer to [facility]/urology office). Pain controlled on current regimen, patient would like some medications on discharge, discussed short course of prn medications, ok with sending to [facility]. Patient is in agreement with discharge plan, no other questions/concerns at this time.      Discharge time 35 minutes      Acute blood loss anemia secondary to hematuria  - hgb downtrended from 10 -> 7.4 on 7/9  - received pRBC transfusion with improvement in hgb, now stable (10.7)  - continue to trend hgb and transfuse prn  - CT imaging noted, concern for lobulated hyperdensity within and expanding the right renal collecting system extending to the ureteropelvic junction suggestive of blood clot  -  per urology no acute urological intervention, per IR no active extravasation or pseudoaneurysm noted, no need for repositioning of tube, they expect blood tinged output to clear  - continue to flush NT as per IR  - R nephrostomy output appears to be even lighter on assessment today, will continue to monitor  - ? hematuria due to lysed clot per urology?  - +urinalysis, UTI could be a cause of hematuria, however no signs of systemic infection and cultures (Bcx and Ucx) negative thus far, completed a short course of empiric antibiotics in case  - c/w oxybutynin for bladder spasms    HIV   - c/w HAART  - prior CD4 and VL noted    Dysphagia  - denies currently, swallow eval appreciated  - c/w diet     HTN  - BP meds initially held in setting of hematuria and hypotension  - patient now stable, BP improved (currently in acceptable range) can consider slow reintroduction of BP meds as needed, otpt f/u for continued monitoring and titration of medications    HLD  - on zetia  - lipid profile noted    Chronic pain  - multimodal pain management strategies  - otpt f/u for continued management  - bowel regimen to prevent opioid induced constipation    hx anxiety and depression  - on wellbutrin, trazodone and klonopin prn  - on ambien prn insomnia .

## 2025-07-16 ENCOUNTER — TRANSCRIPTION ENCOUNTER (OUTPATIENT)
Age: 60
End: 2025-07-16

## 2025-07-17 ENCOUNTER — APPOINTMENT (OUTPATIENT)
Dept: UROLOGY | Facility: CLINIC | Age: 60
End: 2025-07-17
Payer: MEDICAID

## 2025-07-17 VITALS
HEART RATE: 73 BPM | HEIGHT: 71 IN | TEMPERATURE: 97.3 F | DIASTOLIC BLOOD PRESSURE: 83 MMHG | OXYGEN SATURATION: 98 % | SYSTOLIC BLOOD PRESSURE: 118 MMHG | WEIGHT: 179 LBS | BODY MASS INDEX: 25.06 KG/M2

## 2025-07-17 PROBLEM — N50.89 SCROTAL SWELLING: Status: ACTIVE | Noted: 2025-07-17

## 2025-07-17 PROCEDURE — 99215 OFFICE O/P EST HI 40 MIN: CPT

## 2025-07-17 PROCEDURE — G2211 COMPLEX E/M VISIT ADD ON: CPT | Mod: NC

## 2025-07-17 RX ORDER — MIRABEGRON 50 MG/1
50 TABLET, EXTENDED RELEASE ORAL
Qty: 30 | Refills: 0 | Status: ACTIVE | COMMUNITY
Start: 2025-07-17 | End: 1900-01-01

## 2025-07-18 ENCOUNTER — TRANSCRIPTION ENCOUNTER (OUTPATIENT)
Age: 60
End: 2025-07-18

## 2025-07-23 NOTE — ED ADULT NURSE NOTE - PAIN RATING/NUMBER SCALE (0-10): ACTIVITY
Quality 226: Preventive Care And Screening: Tobacco Use: Screening And Cessation Intervention: Patient screened for tobacco use and is an ex/non-smoker
Quality 130: Documentation Of Current Medications In The Medical Record: Current Medications Documented
Detail Level: Detailed
9 (severe pain)

## 2025-07-24 ENCOUNTER — INPATIENT (INPATIENT)
Facility: HOSPITAL | Age: 60
LOS: 2 days | Discharge: ANOTHER HOSPITAL | DRG: 690 | End: 2025-07-27
Attending: STUDENT IN AN ORGANIZED HEALTH CARE EDUCATION/TRAINING PROGRAM | Admitting: STUDENT IN AN ORGANIZED HEALTH CARE EDUCATION/TRAINING PROGRAM
Payer: MEDICAID

## 2025-07-24 ENCOUNTER — APPOINTMENT (OUTPATIENT)
Dept: INFECTIOUS DISEASE | Facility: CLINIC | Age: 60
End: 2025-07-24

## 2025-07-24 VITALS
TEMPERATURE: 98 F | SYSTOLIC BLOOD PRESSURE: 123 MMHG | DIASTOLIC BLOOD PRESSURE: 89 MMHG | HEART RATE: 78 BPM | OXYGEN SATURATION: 99 % | WEIGHT: 185.19 LBS | HEIGHT: 71 IN | RESPIRATION RATE: 17 BRPM

## 2025-07-24 DIAGNOSIS — N39.0 URINARY TRACT INFECTION, SITE NOT SPECIFIED: ICD-10-CM

## 2025-07-24 DIAGNOSIS — Z93.6 OTHER ARTIFICIAL OPENINGS OF URINARY TRACT STATUS: Chronic | ICD-10-CM

## 2025-07-24 DIAGNOSIS — Z93.3 COLOSTOMY STATUS: Chronic | ICD-10-CM

## 2025-07-24 LAB
ALBUMIN SERPL ELPH-MCNC: 3.3 G/DL — LOW (ref 3.5–5)
ALP SERPL-CCNC: 95 U/L — SIGNIFICANT CHANGE UP (ref 40–120)
ALT FLD-CCNC: 17 U/L DA — SIGNIFICANT CHANGE UP (ref 10–60)
ANION GAP SERPL CALC-SCNC: 0 MMOL/L — LOW (ref 5–17)
APPEARANCE UR: ABNORMAL
APPEARANCE UR: ABNORMAL
APTT BLD: 32.8 SEC — SIGNIFICANT CHANGE UP (ref 26.1–36.8)
AST SERPL-CCNC: 24 U/L — SIGNIFICANT CHANGE UP (ref 10–40)
BACTERIA # UR AUTO: ABNORMAL /HPF
BASOPHILS # BLD AUTO: 0.06 K/UL — SIGNIFICANT CHANGE UP (ref 0–0.2)
BASOPHILS NFR BLD AUTO: 0.9 % — SIGNIFICANT CHANGE UP (ref 0–2)
BILIRUB SERPL-MCNC: 0.2 MG/DL — SIGNIFICANT CHANGE UP (ref 0.2–1.2)
BILIRUB UR-MCNC: NEGATIVE — SIGNIFICANT CHANGE UP
BILIRUB UR-MCNC: NEGATIVE — SIGNIFICANT CHANGE UP
BUN SERPL-MCNC: 22 MG/DL — HIGH (ref 7–18)
CALCIUM SERPL-MCNC: 9.2 MG/DL — SIGNIFICANT CHANGE UP (ref 8.4–10.5)
CHLORIDE SERPL-SCNC: 109 MMOL/L — HIGH (ref 96–108)
CO2 SERPL-SCNC: 29 MMOL/L — SIGNIFICANT CHANGE UP (ref 22–31)
COLOR SPEC: YELLOW — SIGNIFICANT CHANGE UP
COLOR SPEC: YELLOW — SIGNIFICANT CHANGE UP
CREAT SERPL-MCNC: 1.02 MG/DL — SIGNIFICANT CHANGE UP (ref 0.5–1.3)
DIFF PNL FLD: ABNORMAL
DIFF PNL FLD: ABNORMAL
EGFR: 85 ML/MIN/1.73M2 — SIGNIFICANT CHANGE UP
EGFR: 85 ML/MIN/1.73M2 — SIGNIFICANT CHANGE UP
EOSINOPHIL # BLD AUTO: 0.1 K/UL — SIGNIFICANT CHANGE UP (ref 0–0.5)
EOSINOPHIL NFR BLD AUTO: 1.5 % — SIGNIFICANT CHANGE UP (ref 0–6)
EPI CELLS # UR: PRESENT
GLUCOSE SERPL-MCNC: 82 MG/DL — SIGNIFICANT CHANGE UP (ref 70–99)
GLUCOSE UR QL: NEGATIVE MG/DL — SIGNIFICANT CHANGE UP
GLUCOSE UR QL: NEGATIVE MG/DL — SIGNIFICANT CHANGE UP
GRAN CASTS # UR COMP ASSIST: PRESENT
HCT VFR BLD CALC: 37.2 % — LOW (ref 39–50)
HGB BLD-MCNC: 11.9 G/DL — LOW (ref 13–17)
IMM GRANULOCYTES NFR BLD AUTO: 0.2 % — SIGNIFICANT CHANGE UP (ref 0–0.9)
INR BLD: 1.03 RATIO — SIGNIFICANT CHANGE UP (ref 0.85–1.16)
KETONES UR QL: NEGATIVE MG/DL — SIGNIFICANT CHANGE UP
KETONES UR QL: NEGATIVE MG/DL — SIGNIFICANT CHANGE UP
LEUKOCYTE ESTERASE UR-ACNC: ABNORMAL
LEUKOCYTE ESTERASE UR-ACNC: ABNORMAL
LIDOCAIN IGE QN: 31 U/L — SIGNIFICANT CHANGE UP (ref 13–75)
LYMPHOCYTES # BLD AUTO: 2.54 K/UL — SIGNIFICANT CHANGE UP (ref 1–3.3)
LYMPHOCYTES # BLD AUTO: 38.4 % — SIGNIFICANT CHANGE UP (ref 13–44)
MCHC RBC-ENTMCNC: 30.1 PG — SIGNIFICANT CHANGE UP (ref 27–34)
MCHC RBC-ENTMCNC: 32 G/DL — SIGNIFICANT CHANGE UP (ref 32–36)
MCV RBC AUTO: 93.9 FL — SIGNIFICANT CHANGE UP (ref 80–100)
MONOCYTES # BLD AUTO: 0.77 K/UL — SIGNIFICANT CHANGE UP (ref 0–0.9)
MONOCYTES NFR BLD AUTO: 11.6 % — SIGNIFICANT CHANGE UP (ref 2–14)
NEUTROPHILS # BLD AUTO: 3.13 K/UL — SIGNIFICANT CHANGE UP (ref 1.8–7.4)
NEUTROPHILS NFR BLD AUTO: 47.4 % — SIGNIFICANT CHANGE UP (ref 43–77)
NITRITE UR-MCNC: POSITIVE
NITRITE UR-MCNC: POSITIVE
NRBC BLD AUTO-RTO: 0 /100 WBCS — SIGNIFICANT CHANGE UP (ref 0–0)
PH UR: 7 — SIGNIFICANT CHANGE UP (ref 5–8)
PH UR: 7.5 — SIGNIFICANT CHANGE UP (ref 5–8)
PLATELET # BLD AUTO: 314 K/UL — SIGNIFICANT CHANGE UP (ref 150–400)
POTASSIUM SERPL-MCNC: 4.5 MMOL/L — SIGNIFICANT CHANGE UP (ref 3.5–5.3)
POTASSIUM SERPL-SCNC: 4.5 MMOL/L — SIGNIFICANT CHANGE UP (ref 3.5–5.3)
PROT SERPL-MCNC: 7.7 G/DL — SIGNIFICANT CHANGE UP (ref 6–8.3)
PROT UR-MCNC: 30 MG/DL
PROT UR-MCNC: 30 MG/DL
PROTHROM AB SERPL-ACNC: 12 SEC — SIGNIFICANT CHANGE UP (ref 9.9–13.4)
RBC # BLD: 3.96 M/UL — LOW (ref 4.2–5.8)
RBC # FLD: 15.8 % — HIGH (ref 10.3–14.5)
RBC CASTS # UR COMP ASSIST: >50 /HPF — HIGH (ref 0–4)
RBC CASTS # UR COMP ASSIST: SIGNIFICANT CHANGE UP /HPF (ref 0–4)
SODIUM SERPL-SCNC: 138 MMOL/L — SIGNIFICANT CHANGE UP (ref 135–145)
SP GR SPEC: 1.01 — SIGNIFICANT CHANGE UP (ref 1–1.03)
SP GR SPEC: 1.02 — SIGNIFICANT CHANGE UP (ref 1–1.03)
UROBILINOGEN FLD QL: 0.2 MG/DL — SIGNIFICANT CHANGE UP (ref 0.2–1)
UROBILINOGEN FLD QL: 0.2 MG/DL — SIGNIFICANT CHANGE UP (ref 0.2–1)
WBC # BLD: 6.61 K/UL — SIGNIFICANT CHANGE UP (ref 3.8–10.5)
WBC # FLD AUTO: 6.61 K/UL — SIGNIFICANT CHANGE UP (ref 3.8–10.5)
WBC UR QL: 25 /HPF — HIGH (ref 0–5)
WBC UR QL: SIGNIFICANT CHANGE UP /HPF (ref 0–5)

## 2025-07-24 PROCEDURE — 99285 EMERGENCY DEPT VISIT HI MDM: CPT

## 2025-07-24 PROCEDURE — 85025 COMPLETE CBC W/AUTO DIFF WBC: CPT

## 2025-07-24 PROCEDURE — 80053 COMPREHEN METABOLIC PANEL: CPT

## 2025-07-24 PROCEDURE — 99223 1ST HOSP IP/OBS HIGH 75: CPT | Mod: GC

## 2025-07-24 PROCEDURE — 74177 CT ABD & PELVIS W/CONTRAST: CPT

## 2025-07-24 PROCEDURE — 74177 CT ABD & PELVIS W/CONTRAST: CPT | Mod: 26

## 2025-07-24 PROCEDURE — 36415 COLL VENOUS BLD VENIPUNCTURE: CPT

## 2025-07-24 PROCEDURE — 87086 URINE CULTURE/COLONY COUNT: CPT

## 2025-07-24 PROCEDURE — 85730 THROMBOPLASTIN TIME PARTIAL: CPT

## 2025-07-24 PROCEDURE — 81001 URINALYSIS AUTO W/SCOPE: CPT

## 2025-07-24 PROCEDURE — 85610 PROTHROMBIN TIME: CPT

## 2025-07-24 PROCEDURE — 83690 ASSAY OF LIPASE: CPT

## 2025-07-24 RX ORDER — TRAZODONE HCL 100 MG
1 TABLET ORAL
Refills: 0 | DISCHARGE

## 2025-07-24 RX ORDER — ENOXAPARIN SODIUM 100 MG/ML
40 INJECTION SUBCUTANEOUS EVERY 24 HOURS
Refills: 0 | Status: DISCONTINUED | OUTPATIENT
Start: 2025-07-24 | End: 2025-07-26

## 2025-07-24 RX ORDER — CEFEPIME 2 G/20ML
2000 INJECTION, POWDER, FOR SOLUTION INTRAVENOUS EVERY 12 HOURS
Refills: 0 | Status: DISCONTINUED | OUTPATIENT
Start: 2025-07-24 | End: 2025-07-27

## 2025-07-24 RX ORDER — LACTULOSE 10 G/15ML
10 SOLUTION ORAL
Refills: 0 | Status: DISCONTINUED | OUTPATIENT
Start: 2025-07-24 | End: 2025-07-25

## 2025-07-24 RX ORDER — BICTEGRAVIR SODIUM, EMTRICITABINE, AND TENOFOVIR ALAFENAMIDE FUMARATE 50; 200; 25 MG/1; MG/1; MG/1
1 TABLET ORAL DAILY
Refills: 0 | Status: DISCONTINUED | OUTPATIENT
Start: 2025-07-24 | End: 2025-07-27

## 2025-07-24 RX ORDER — MELATONIN 5 MG
3 TABLET ORAL AT BEDTIME
Refills: 0 | Status: DISCONTINUED | OUTPATIENT
Start: 2025-07-24 | End: 2025-07-27

## 2025-07-24 RX ORDER — EZETIMIBE 10 MG/1
10 TABLET ORAL AT BEDTIME
Refills: 0 | Status: DISCONTINUED | OUTPATIENT
Start: 2025-07-24 | End: 2025-07-27

## 2025-07-24 RX ORDER — KETOROLAC TROMETHAMINE 30 MG/ML
15 INJECTION, SOLUTION INTRAMUSCULAR; INTRAVENOUS ONCE
Refills: 0 | Status: DISCONTINUED | OUTPATIENT
Start: 2025-07-24 | End: 2025-07-24

## 2025-07-24 RX ORDER — ACETAMINOPHEN 500 MG/5ML
650 LIQUID (ML) ORAL EVERY 6 HOURS
Refills: 0 | Status: DISCONTINUED | OUTPATIENT
Start: 2025-07-24 | End: 2025-07-25

## 2025-07-24 RX ORDER — POLYETHYLENE GLYCOL 3350 17 G/17G
17 POWDER, FOR SOLUTION ORAL
Refills: 0 | Status: DISCONTINUED | OUTPATIENT
Start: 2025-07-24 | End: 2025-07-27

## 2025-07-24 RX ORDER — CEFTRIAXONE 500 MG/1
1000 INJECTION, POWDER, FOR SOLUTION INTRAMUSCULAR; INTRAVENOUS ONCE
Refills: 0 | Status: COMPLETED | OUTPATIENT
Start: 2025-07-24 | End: 2025-07-24

## 2025-07-24 RX ORDER — CLONAZEPAM 0.5 MG/1
1 TABLET ORAL
Refills: 0 | Status: DISCONTINUED | OUTPATIENT
Start: 2025-07-24 | End: 2025-07-27

## 2025-07-24 RX ORDER — SODIUM CHLORIDE 9 G/1000ML
500 INJECTION, SOLUTION INTRAVENOUS
Refills: 0 | Status: DISCONTINUED | OUTPATIENT
Start: 2025-07-24 | End: 2025-07-25

## 2025-07-24 RX ORDER — SENNA 187 MG
2 TABLET ORAL AT BEDTIME
Refills: 0 | Status: DISCONTINUED | OUTPATIENT
Start: 2025-07-24 | End: 2025-07-27

## 2025-07-24 RX ORDER — BUPROPION HYDROBROMIDE 522 MG/1
150 TABLET, EXTENDED RELEASE ORAL DAILY
Refills: 0 | Status: DISCONTINUED | OUTPATIENT
Start: 2025-07-24 | End: 2025-07-27

## 2025-07-24 RX ORDER — DILTIAZEM HYDROCHLORIDE 240 MG/1
240 TABLET, EXTENDED RELEASE ORAL DAILY
Refills: 0 | Status: DISCONTINUED | OUTPATIENT
Start: 2025-07-24 | End: 2025-07-27

## 2025-07-24 RX ADMIN — Medication 3 MILLIGRAM(S): at 22:26

## 2025-07-24 RX ADMIN — CEFEPIME 100 MILLIGRAM(S): 2 INJECTION, POWDER, FOR SOLUTION INTRAVENOUS at 18:48

## 2025-07-24 RX ADMIN — POLYETHYLENE GLYCOL 3350 17 GRAM(S): 17 POWDER, FOR SOLUTION ORAL at 18:47

## 2025-07-24 RX ADMIN — KETOROLAC TROMETHAMINE 15 MILLIGRAM(S): 30 INJECTION, SOLUTION INTRAMUSCULAR; INTRAVENOUS at 22:35

## 2025-07-24 RX ADMIN — EZETIMIBE 10 MILLIGRAM(S): 10 TABLET ORAL at 22:25

## 2025-07-24 RX ADMIN — KETOROLAC TROMETHAMINE 15 MILLIGRAM(S): 30 INJECTION, SOLUTION INTRAMUSCULAR; INTRAVENOUS at 21:35

## 2025-07-24 RX ADMIN — LACTULOSE 10 GRAM(S): 10 SOLUTION ORAL at 22:25

## 2025-07-24 RX ADMIN — SODIUM CHLORIDE 999 MILLILITER(S): 9 INJECTION, SOLUTION INTRAVENOUS at 21:35

## 2025-07-24 RX ADMIN — Medication 2 MILLIGRAM(S): at 11:10

## 2025-07-24 RX ADMIN — CEFTRIAXONE 100 MILLIGRAM(S): 500 INJECTION, POWDER, FOR SOLUTION INTRAMUSCULAR; INTRAVENOUS at 13:53

## 2025-07-25 DIAGNOSIS — K59.00 CONSTIPATION, UNSPECIFIED: ICD-10-CM

## 2025-07-25 DIAGNOSIS — I10 ESSENTIAL (PRIMARY) HYPERTENSION: ICD-10-CM

## 2025-07-25 DIAGNOSIS — Z93.9 ARTIFICIAL OPENING STATUS, UNSPECIFIED: ICD-10-CM

## 2025-07-25 DIAGNOSIS — Z87.438 PERSONAL HISTORY OF OTHER DISEASES OF MALE GENITAL ORGANS: ICD-10-CM

## 2025-07-25 DIAGNOSIS — Z85.46 PERSONAL HISTORY OF MALIGNANT NEOPLASM OF PROSTATE: ICD-10-CM

## 2025-07-25 DIAGNOSIS — B20 HUMAN IMMUNODEFICIENCY VIRUS [HIV] DISEASE: ICD-10-CM

## 2025-07-25 DIAGNOSIS — N39.0 URINARY TRACT INFECTION, SITE NOT SPECIFIED: ICD-10-CM

## 2025-07-25 DIAGNOSIS — E78.5 HYPERLIPIDEMIA, UNSPECIFIED: ICD-10-CM

## 2025-07-25 DIAGNOSIS — Z85.048 PERSONAL HISTORY OF OTHER MALIGNANT NEOPLASM OF RECTUM, RECTOSIGMOID JUNCTION, AND ANUS: ICD-10-CM

## 2025-07-25 DIAGNOSIS — D64.9 ANEMIA, UNSPECIFIED: ICD-10-CM

## 2025-07-25 DIAGNOSIS — Z79.899 OTHER LONG TERM (CURRENT) DRUG THERAPY: ICD-10-CM

## 2025-07-25 DIAGNOSIS — Z98.890 OTHER SPECIFIED POSTPROCEDURAL STATES: ICD-10-CM

## 2025-07-25 LAB
ALBUMIN SERPL ELPH-MCNC: 3.2 G/DL — LOW (ref 3.5–5)
ALBUMIN SERPL ELPH-MCNC: 3.2 G/DL — LOW (ref 3.5–5)
ALP SERPL-CCNC: 81 U/L — SIGNIFICANT CHANGE UP (ref 40–120)
ALP SERPL-CCNC: 85 U/L — SIGNIFICANT CHANGE UP (ref 40–120)
ALT FLD-CCNC: 13 U/L DA — SIGNIFICANT CHANGE UP (ref 10–60)
ALT FLD-CCNC: 17 U/L DA — SIGNIFICANT CHANGE UP (ref 10–60)
ANION GAP SERPL CALC-SCNC: 4 MMOL/L — LOW (ref 5–17)
ANION GAP SERPL CALC-SCNC: 6 MMOL/L — SIGNIFICANT CHANGE UP (ref 5–17)
APTT BLD: 31.2 SEC — SIGNIFICANT CHANGE UP (ref 26.1–36.8)
AST SERPL-CCNC: 12 U/L — SIGNIFICANT CHANGE UP (ref 10–40)
AST SERPL-CCNC: 12 U/L — SIGNIFICANT CHANGE UP (ref 10–40)
BASOPHILS # BLD AUTO: 0.04 K/UL — SIGNIFICANT CHANGE UP (ref 0–0.2)
BASOPHILS # BLD AUTO: 0.06 K/UL — SIGNIFICANT CHANGE UP (ref 0–0.2)
BASOPHILS NFR BLD AUTO: 0.8 % — SIGNIFICANT CHANGE UP (ref 0–2)
BASOPHILS NFR BLD AUTO: 0.9 % — SIGNIFICANT CHANGE UP (ref 0–2)
BILIRUB SERPL-MCNC: 0.3 MG/DL — SIGNIFICANT CHANGE UP (ref 0.2–1.2)
BILIRUB SERPL-MCNC: 0.3 MG/DL — SIGNIFICANT CHANGE UP (ref 0.2–1.2)
BLD GP AB SCN SERPL QL: SIGNIFICANT CHANGE UP
BUN SERPL-MCNC: 16 MG/DL — SIGNIFICANT CHANGE UP (ref 7–18)
BUN SERPL-MCNC: 19 MG/DL — HIGH (ref 7–18)
CALCIUM SERPL-MCNC: 8.8 MG/DL — SIGNIFICANT CHANGE UP (ref 8.4–10.5)
CALCIUM SERPL-MCNC: 8.9 MG/DL — SIGNIFICANT CHANGE UP (ref 8.4–10.5)
CHLORIDE SERPL-SCNC: 107 MMOL/L — SIGNIFICANT CHANGE UP (ref 96–108)
CHLORIDE SERPL-SCNC: 108 MMOL/L — SIGNIFICANT CHANGE UP (ref 96–108)
CO2 SERPL-SCNC: 24 MMOL/L — SIGNIFICANT CHANGE UP (ref 22–31)
CO2 SERPL-SCNC: 26 MMOL/L — SIGNIFICANT CHANGE UP (ref 22–31)
CREAT SERPL-MCNC: 0.88 MG/DL — SIGNIFICANT CHANGE UP (ref 0.5–1.3)
CREAT SERPL-MCNC: 1.05 MG/DL — SIGNIFICANT CHANGE UP (ref 0.5–1.3)
EGFR: 82 ML/MIN/1.73M2 — SIGNIFICANT CHANGE UP
EGFR: 82 ML/MIN/1.73M2 — SIGNIFICANT CHANGE UP
EGFR: 99 ML/MIN/1.73M2 — SIGNIFICANT CHANGE UP
EGFR: 99 ML/MIN/1.73M2 — SIGNIFICANT CHANGE UP
EOSINOPHIL # BLD AUTO: 0.07 K/UL — SIGNIFICANT CHANGE UP (ref 0–0.5)
EOSINOPHIL # BLD AUTO: 0.09 K/UL — SIGNIFICANT CHANGE UP (ref 0–0.5)
EOSINOPHIL NFR BLD AUTO: 1 % — SIGNIFICANT CHANGE UP (ref 0–6)
EOSINOPHIL NFR BLD AUTO: 1.8 % — SIGNIFICANT CHANGE UP (ref 0–6)
GLUCOSE BLDC GLUCOMTR-MCNC: 128 MG/DL — HIGH (ref 70–99)
GLUCOSE SERPL-MCNC: 105 MG/DL — HIGH (ref 70–99)
GLUCOSE SERPL-MCNC: 118 MG/DL — HIGH (ref 70–99)
HCT VFR BLD CALC: 35.1 % — LOW (ref 39–50)
HCT VFR BLD CALC: 35.2 % — LOW (ref 39–50)
HGB BLD-MCNC: 11.2 G/DL — LOW (ref 13–17)
HGB BLD-MCNC: 11.6 G/DL — LOW (ref 13–17)
IMM GRANULOCYTES NFR BLD AUTO: 0.2 % — SIGNIFICANT CHANGE UP (ref 0–0.9)
IMM GRANULOCYTES NFR BLD AUTO: 0.3 % — SIGNIFICANT CHANGE UP (ref 0–0.9)
INR BLD: 1.08 RATIO — SIGNIFICANT CHANGE UP (ref 0.85–1.16)
LACTATE SERPL-SCNC: 3.7 MMOL/L — HIGH (ref 0.7–2)
LYMPHOCYTES # BLD AUTO: 2.03 K/UL — SIGNIFICANT CHANGE UP (ref 1–3.3)
LYMPHOCYTES # BLD AUTO: 3.34 K/UL — HIGH (ref 1–3.3)
LYMPHOCYTES # BLD AUTO: 40.4 % — SIGNIFICANT CHANGE UP (ref 13–44)
LYMPHOCYTES # BLD AUTO: 49 % — HIGH (ref 13–44)
MAGNESIUM SERPL-MCNC: 2 MG/DL — SIGNIFICANT CHANGE UP (ref 1.6–2.6)
MAGNESIUM SERPL-MCNC: 2.2 MG/DL — SIGNIFICANT CHANGE UP (ref 1.6–2.6)
MCHC RBC-ENTMCNC: 29.3 PG — SIGNIFICANT CHANGE UP (ref 27–34)
MCHC RBC-ENTMCNC: 29.5 PG — SIGNIFICANT CHANGE UP (ref 27–34)
MCHC RBC-ENTMCNC: 31.8 G/DL — LOW (ref 32–36)
MCHC RBC-ENTMCNC: 33 G/DL — SIGNIFICANT CHANGE UP (ref 32–36)
MCV RBC AUTO: 89.3 FL — SIGNIFICANT CHANGE UP (ref 80–100)
MCV RBC AUTO: 92.1 FL — SIGNIFICANT CHANGE UP (ref 80–100)
MONOCYTES # BLD AUTO: 0.61 K/UL — SIGNIFICANT CHANGE UP (ref 0–0.9)
MONOCYTES # BLD AUTO: 0.8 K/UL — SIGNIFICANT CHANGE UP (ref 0–0.9)
MONOCYTES NFR BLD AUTO: 11.7 % — SIGNIFICANT CHANGE UP (ref 2–14)
MONOCYTES NFR BLD AUTO: 12.1 % — SIGNIFICANT CHANGE UP (ref 2–14)
NEUTROPHILS # BLD AUTO: 2.25 K/UL — SIGNIFICANT CHANGE UP (ref 1.8–7.4)
NEUTROPHILS # BLD AUTO: 2.52 K/UL — SIGNIFICANT CHANGE UP (ref 1.8–7.4)
NEUTROPHILS NFR BLD AUTO: 37.1 % — LOW (ref 43–77)
NEUTROPHILS NFR BLD AUTO: 44.7 % — SIGNIFICANT CHANGE UP (ref 43–77)
NRBC BLD AUTO-RTO: 0 /100 WBCS — SIGNIFICANT CHANGE UP (ref 0–0)
NRBC BLD AUTO-RTO: 0 /100 WBCS — SIGNIFICANT CHANGE UP (ref 0–0)
PHOSPHATE SERPL-MCNC: 2.3 MG/DL — LOW (ref 2.5–4.5)
PHOSPHATE SERPL-MCNC: 2.7 MG/DL — SIGNIFICANT CHANGE UP (ref 2.5–4.5)
PLATELET # BLD AUTO: 302 K/UL — SIGNIFICANT CHANGE UP (ref 150–400)
PLATELET # BLD AUTO: 335 K/UL — SIGNIFICANT CHANGE UP (ref 150–400)
POTASSIUM SERPL-MCNC: 3.8 MMOL/L — SIGNIFICANT CHANGE UP (ref 3.5–5.3)
POTASSIUM SERPL-MCNC: 3.8 MMOL/L — SIGNIFICANT CHANGE UP (ref 3.5–5.3)
POTASSIUM SERPL-SCNC: 3.8 MMOL/L — SIGNIFICANT CHANGE UP (ref 3.5–5.3)
POTASSIUM SERPL-SCNC: 3.8 MMOL/L — SIGNIFICANT CHANGE UP (ref 3.5–5.3)
PROT SERPL-MCNC: 6.8 G/DL — SIGNIFICANT CHANGE UP (ref 6–8.3)
PROT SERPL-MCNC: 7.3 G/DL — SIGNIFICANT CHANGE UP (ref 6–8.3)
PROTHROM AB SERPL-ACNC: 12.5 SEC — SIGNIFICANT CHANGE UP (ref 9.9–13.4)
RBC # BLD: 3.82 M/UL — LOW (ref 4.2–5.8)
RBC # BLD: 3.93 M/UL — LOW (ref 4.2–5.8)
RBC # FLD: 15 % — HIGH (ref 10.3–14.5)
RBC # FLD: 15.4 % — HIGH (ref 10.3–14.5)
SODIUM SERPL-SCNC: 137 MMOL/L — SIGNIFICANT CHANGE UP (ref 135–145)
SODIUM SERPL-SCNC: 138 MMOL/L — SIGNIFICANT CHANGE UP (ref 135–145)
TROPONIN I, HIGH SENSITIVITY RESULT: 3.8 NG/L — SIGNIFICANT CHANGE UP
WBC # BLD: 5.03 K/UL — SIGNIFICANT CHANGE UP (ref 3.8–10.5)
WBC # BLD: 6.81 K/UL — SIGNIFICANT CHANGE UP (ref 3.8–10.5)
WBC # FLD AUTO: 5.03 K/UL — SIGNIFICANT CHANGE UP (ref 3.8–10.5)
WBC # FLD AUTO: 6.81 K/UL — SIGNIFICANT CHANGE UP (ref 3.8–10.5)

## 2025-07-25 PROCEDURE — 99232 SBSQ HOSP IP/OBS MODERATE 35: CPT | Mod: GC

## 2025-07-25 PROCEDURE — 86850 RBC ANTIBODY SCREEN: CPT

## 2025-07-25 PROCEDURE — 85730 THROMBOPLASTIN TIME PARTIAL: CPT

## 2025-07-25 PROCEDURE — 71045 X-RAY EXAM CHEST 1 VIEW: CPT

## 2025-07-25 PROCEDURE — 71045 X-RAY EXAM CHEST 1 VIEW: CPT | Mod: 26

## 2025-07-25 PROCEDURE — 84100 ASSAY OF PHOSPHORUS: CPT

## 2025-07-25 PROCEDURE — 82962 GLUCOSE BLOOD TEST: CPT

## 2025-07-25 PROCEDURE — 74174 CTA ABD&PLVS W/CONTRAST: CPT | Mod: 26

## 2025-07-25 PROCEDURE — 86901 BLOOD TYPING SEROLOGIC RH(D): CPT

## 2025-07-25 PROCEDURE — 83690 ASSAY OF LIPASE: CPT

## 2025-07-25 PROCEDURE — 83605 ASSAY OF LACTIC ACID: CPT

## 2025-07-25 PROCEDURE — 74177 CT ABD & PELVIS W/CONTRAST: CPT

## 2025-07-25 PROCEDURE — 83735 ASSAY OF MAGNESIUM: CPT

## 2025-07-25 PROCEDURE — 85610 PROTHROMBIN TIME: CPT

## 2025-07-25 PROCEDURE — 74174 CTA ABD&PLVS W/CONTRAST: CPT

## 2025-07-25 PROCEDURE — 81001 URINALYSIS AUTO W/SCOPE: CPT

## 2025-07-25 PROCEDURE — 84484 ASSAY OF TROPONIN QUANT: CPT

## 2025-07-25 PROCEDURE — 85025 COMPLETE CBC W/AUTO DIFF WBC: CPT

## 2025-07-25 PROCEDURE — 86900 BLOOD TYPING SEROLOGIC ABO: CPT

## 2025-07-25 PROCEDURE — 87086 URINE CULTURE/COLONY COUNT: CPT

## 2025-07-25 PROCEDURE — 36415 COLL VENOUS BLD VENIPUNCTURE: CPT

## 2025-07-25 PROCEDURE — 80053 COMPREHEN METABOLIC PANEL: CPT

## 2025-07-25 RX ORDER — LACTULOSE 10 G/15ML
10 SOLUTION ORAL
Refills: 0 | Status: DISCONTINUED | OUTPATIENT
Start: 2025-07-25 | End: 2025-07-27

## 2025-07-25 RX ORDER — ACETAMINOPHEN 500 MG/5ML
1000 LIQUID (ML) ORAL EVERY 8 HOURS
Refills: 0 | Status: DISCONTINUED | OUTPATIENT
Start: 2025-07-25 | End: 2025-07-27

## 2025-07-25 RX ORDER — SODIUM CHLORIDE 9 G/1000ML
1000 INJECTION, SOLUTION INTRAVENOUS
Refills: 0 | Status: DISCONTINUED | OUTPATIENT
Start: 2025-07-25 | End: 2025-07-27

## 2025-07-25 RX ORDER — HYDROMORPHONE/SOD CHLOR,ISO/PF 2 MG/10 ML
1 SYRINGE (ML) INJECTION ONCE
Refills: 0 | Status: DISCONTINUED | OUTPATIENT
Start: 2025-07-25 | End: 2025-07-25

## 2025-07-25 RX ORDER — SODIUM CHLORIDE 9 G/1000ML
1000 INJECTION, SOLUTION INTRAVENOUS ONCE
Refills: 0 | Status: COMPLETED | OUTPATIENT
Start: 2025-07-25 | End: 2025-07-25

## 2025-07-25 RX ORDER — KETOROLAC TROMETHAMINE 30 MG/ML
15 INJECTION, SOLUTION INTRAMUSCULAR; INTRAVENOUS ONCE
Refills: 0 | Status: DISCONTINUED | OUTPATIENT
Start: 2025-07-25 | End: 2025-07-25

## 2025-07-25 RX ADMIN — Medication 1000 MILLIGRAM(S): at 22:29

## 2025-07-25 RX ADMIN — SODIUM CHLORIDE 1000 MILLILITER(S): 9 INJECTION, SOLUTION INTRAVENOUS at 22:30

## 2025-07-25 RX ADMIN — KETOROLAC TROMETHAMINE 15 MILLIGRAM(S): 30 INJECTION, SOLUTION INTRAMUSCULAR; INTRAVENOUS at 12:08

## 2025-07-25 RX ADMIN — LACTULOSE 10 GRAM(S): 10 SOLUTION ORAL at 17:26

## 2025-07-25 RX ADMIN — Medication 1 MILLIGRAM(S): at 21:11

## 2025-07-25 RX ADMIN — ENOXAPARIN SODIUM 40 MILLIGRAM(S): 100 INJECTION SUBCUTANEOUS at 05:23

## 2025-07-25 RX ADMIN — Medication 1 MILLIGRAM(S): at 22:11

## 2025-07-25 RX ADMIN — LACTULOSE 10 GRAM(S): 10 SOLUTION ORAL at 05:22

## 2025-07-25 RX ADMIN — POLYETHYLENE GLYCOL 3350 17 GRAM(S): 17 POWDER, FOR SOLUTION ORAL at 17:26

## 2025-07-25 RX ADMIN — Medication 1000 MILLIGRAM(S): at 23:29

## 2025-07-25 RX ADMIN — CEFEPIME 100 MILLIGRAM(S): 2 INJECTION, POWDER, FOR SOLUTION INTRAVENOUS at 17:27

## 2025-07-25 RX ADMIN — Medication 3 MILLIGRAM(S): at 22:29

## 2025-07-25 RX ADMIN — EZETIMIBE 10 MILLIGRAM(S): 10 TABLET ORAL at 22:28

## 2025-07-25 RX ADMIN — Medication 1000 MILLIGRAM(S): at 11:50

## 2025-07-25 RX ADMIN — BICTEGRAVIR SODIUM, EMTRICITABINE, AND TENOFOVIR ALAFENAMIDE FUMARATE 1 TABLET(S): 50; 200; 25 TABLET ORAL at 12:08

## 2025-07-25 RX ADMIN — CLONAZEPAM 1 MILLIGRAM(S): 0.5 TABLET ORAL at 21:11

## 2025-07-25 RX ADMIN — CEFEPIME 100 MILLIGRAM(S): 2 INJECTION, POWDER, FOR SOLUTION INTRAVENOUS at 05:23

## 2025-07-25 RX ADMIN — POLYETHYLENE GLYCOL 3350 17 GRAM(S): 17 POWDER, FOR SOLUTION ORAL at 05:23

## 2025-07-25 RX ADMIN — SODIUM CHLORIDE 70 MILLILITER(S): 9 INJECTION, SOLUTION INTRAVENOUS at 12:08

## 2025-07-25 RX ADMIN — KETOROLAC TROMETHAMINE 15 MILLIGRAM(S): 30 INJECTION, SOLUTION INTRAMUSCULAR; INTRAVENOUS at 13:00

## 2025-07-25 RX ADMIN — Medication 1000 MILLIGRAM(S): at 10:55

## 2025-07-25 RX ADMIN — DILTIAZEM HYDROCHLORIDE 240 MILLIGRAM(S): 240 TABLET, EXTENDED RELEASE ORAL at 06:01

## 2025-07-25 RX ADMIN — BUPROPION HYDROBROMIDE 150 MILLIGRAM(S): 522 TABLET, EXTENDED RELEASE ORAL at 12:08

## 2025-07-25 NOTE — PROGRESS NOTE ADULT - PROBLEM SELECTOR PLAN 9
"Time: 8:15 AM EDT  Date of encounter:  7/25/2025  Independent Historian/Clinical History and Information was obtained by:   Patient    History is limited by: N/A    Chief Complaint: migraine headache      History of Present Illness:  Patient is a 23 y.o. year old female who presents to the emergency department for evaluation of migraine headache for the past 12 hours with light sensitivity.  Patient has a history of migraine headaches.  Taking ibuprofen at home without relief of symptoms.      Patient Care Team  Primary Care Provider: System, Provider Not In    Past Medical History:     Allergies   Allergen Reactions    Iodinated Contrast Media Nausea And Vomiting     Past Medical History:   Diagnosis Date    Atrial fibrillation     Cardiac disorder     \"enhancement of left and right ventricle\"    Ischemic heart disease     Mitral valve regurgitation     SVT (supraventricular tachycardia)     TIA (transient ischemic attack)      Past Surgical History:   Procedure Laterality Date    CARDIAC ABLATION      CYST REMOVAL Left     lower cheek    DENTAL PROCEDURE      FRACTURE SURGERY Left     ulna and radius    TONSILLECTOMY       History reviewed. No pertinent family history.    Home Medications:  Prior to Admission medications    Medication Sig Start Date End Date Taking? Authorizing Provider   Diclofenac Sodium (VOLTAREN) 1 % gel gel Apply 4 g topically to the appropriate area as directed 4 (Four) Times a Day As Needed (Pain). 11/6/23   Taylor Hickey PA-C   dicyclomine (BENTYL) 20 MG tablet Take 2 tablets by mouth 4 (Four) Times a Day As Needed for Abdominal Cramping. 10/22/24   Beatrice Wallace APRN   dilTIAZem SR (CARDIZEM SR) 120 MG 12 hr capsule Take 1 capsule by mouth Daily.    Provider, MD Yemi   ivabradine HCl (Corlanor) 5 MG tablet tablet Take 1 tablet by mouth 2 (Two) Times a Day With Meals.    Provider, MD Yemi   ondansetron ODT (ZOFRAN-ODT) 4 MG disintegrating tablet Place 1 tablet on the " "tongue 4 (Four) Times a Day As Needed for Nausea or Vomiting for up to 20 doses. 10/22/24   Beatrice Wallace APRN        Social History:   Social History     Tobacco Use    Smoking status: Former     Types: Cigarettes    Smokeless tobacco: Never   Vaping Use    Vaping status: Every Day    Substances: Nicotine, Flavoring    Devices: Refillable tank   Substance Use Topics    Alcohol use: Yes     Comment: approx a bottle of liquor on the weekend    Drug use: Not Currently         Review of Systems:  Review of Systems   Neurological:  Positive for headaches.   All other systems reviewed and are negative.       Physical Exam:  /60 (BP Location: Left arm, Patient Position: Sitting)   Pulse 71   Temp 98 °F (36.7 °C) (Oral)   Resp 17   Ht 160 cm (63\")   Wt 78.2 kg (172 lb 6.4 oz)   SpO2 100%   BMI 30.54 kg/m²     Physical Exam  Vitals and nursing note reviewed.        Vital Signs reviewed, nursing note reviewed  Constitutional: Alert, normal weight, normal appearance, no acute distress, sunglasses on to avoid light sensitivity  HEENT: Normocephalic, atraumatic,  Eyes: PERRL, conjunctivae normal, extraocular movements intact  Cardiovascular: Normal rate, regular rhythm, heart sounds normal\  Pulmonary: Effort normal, breath sounds normal  Musculoskeletal: Range of motion normal  Skin: Warm, dry, normal for ethnicity  Neurological: Oriented x 3  Psychiatric/behavioral: Mood normal, thought content normal, judgment normal, behavior normal            Medical Decision Making:      Comorbidities that affect care:    None    External Notes reviewed:          The following orders were placed and all results were independently analyzed by me:  No orders of the defined types were placed in this encounter.      Medications Given in the Emergency Department:  Medications   sodium chloride 0.9 % bolus 1,000 mL (1,000 mL Intravenous New Bag 7/25/25 0926)   dexAMETHasone sodium phosphate injection 10 mg (10 mg Intravenous " Given 7/25/25 0927)   acetaminophen (OFIRMEV) injection 1,000 mg (1,000 mg Intravenous Given 7/25/25 0941)   metoclopramide (REGLAN) injection 10 mg (10 mg Intravenous Given 7/25/25 0927)        ED Course:         Labs:    Lab Results (last 24 hours)       ** No results found for the last 24 hours. **             Imaging:    No Radiology Exams Resulted Within Past 24 Hours      Differential Diagnosis and Discussion:    Headache: Differential diagnosis includes but is not limited to migraine, cluster headache, hypertension, tumor, subarachnoid bleeding, pseudotumor cerebri, temporal arteritis, infections, tension headache, and TMJ syndrome.    PROCEDURES:        No orders to display       Procedures    MDM       I ordered a typical migraine cocktail to include 30 of IV Toradol, 10 of IV Compazine, 10 of IV Decadron and IV fluids.  Patient told nurse that she did not want the Toradol or the Compazine because they make her anxious.  Instead these meds were discontinued, and she was given IV Tylenol and Reglan.  She is also given IV Decadron and IV fluids.  Patient is feeling better with meds given.              Patient Care Considerations:          Consultants/Shared Management Plan:        Social Determinants of Health:          Disposition and Care Coordination:    Discharged: The patient is suitable and stable for discharge with no need for consideration of admission.        Final diagnoses:   Other migraine without status migrainosus, not intractable        ED Disposition       ED Disposition   Discharge    Condition   Stable    Comment   --               This medical record created using voice recognition software.             Kirill Leblanc PA-C  07/25/25 1024     S/p chemotherapy + radiation in 2020 c/b bilateral nephrostomy tubes and ostomy. In remission. Follows with Dr. Andres Funez (oncology)    - Follow up outpatient with oncology given no active concerns

## 2025-07-26 LAB
ANION GAP SERPL CALC-SCNC: 5 MMOL/L — SIGNIFICANT CHANGE UP (ref 5–17)
APPEARANCE UR: CLEAR — SIGNIFICANT CHANGE UP
BACTERIA # UR AUTO: ABNORMAL /HPF
BASOPHILS # BLD AUTO: 0.04 K/UL — SIGNIFICANT CHANGE UP (ref 0–0.2)
BASOPHILS NFR BLD AUTO: 0.6 % — SIGNIFICANT CHANGE UP (ref 0–2)
BILIRUB UR-MCNC: NEGATIVE — SIGNIFICANT CHANGE UP
BLD GP AB SCN SERPL QL: SIGNIFICANT CHANGE UP
BUN SERPL-MCNC: 14 MG/DL — SIGNIFICANT CHANGE UP (ref 7–18)
CALCIUM SERPL-MCNC: 8.6 MG/DL — SIGNIFICANT CHANGE UP (ref 8.4–10.5)
CHLORIDE SERPL-SCNC: 106 MMOL/L — SIGNIFICANT CHANGE UP (ref 96–108)
CO2 SERPL-SCNC: 27 MMOL/L — SIGNIFICANT CHANGE UP (ref 22–31)
COLOR SPEC: YELLOW — SIGNIFICANT CHANGE UP
CREAT SERPL-MCNC: 0.81 MG/DL — SIGNIFICANT CHANGE UP (ref 0.5–1.3)
DIFF PNL FLD: ABNORMAL
EGFR: 102 ML/MIN/1.73M2 — SIGNIFICANT CHANGE UP
EGFR: 102 ML/MIN/1.73M2 — SIGNIFICANT CHANGE UP
EOSINOPHIL # BLD AUTO: 0.07 K/UL — SIGNIFICANT CHANGE UP (ref 0–0.5)
EOSINOPHIL NFR BLD AUTO: 1 % — SIGNIFICANT CHANGE UP (ref 0–6)
EPI CELLS # UR: PRESENT
FERRITIN SERPL-MCNC: 31 NG/ML — SIGNIFICANT CHANGE UP (ref 30–400)
FOLATE SERPL-MCNC: >20 NG/ML — SIGNIFICANT CHANGE UP
GLUCOSE SERPL-MCNC: 118 MG/DL — HIGH (ref 70–99)
GLUCOSE UR QL: NEGATIVE MG/DL — SIGNIFICANT CHANGE UP
HAPTOGLOB SERPL-MCNC: 160 MG/DL — SIGNIFICANT CHANGE UP (ref 34–200)
HCT VFR BLD CALC: 32.2 % — LOW (ref 39–50)
HCT VFR BLD CALC: 32.6 % — LOW (ref 39–50)
HCT VFR BLD CALC: 32.8 % — LOW (ref 39–50)
HGB BLD-MCNC: 10.6 G/DL — LOW (ref 13–17)
HGB BLD-MCNC: 10.6 G/DL — LOW (ref 13–17)
HGB BLD-MCNC: 10.7 G/DL — LOW (ref 13–17)
IMM GRANULOCYTES NFR BLD AUTO: 0.4 % — SIGNIFICANT CHANGE UP (ref 0–0.9)
IRON SATN MFR SERPL: 18 % — LOW (ref 20–55)
IRON SATN MFR SERPL: 59 UG/DL — LOW (ref 65–170)
KETONES UR QL: ABNORMAL MG/DL
LACTATE SERPL-SCNC: 0.9 MMOL/L — SIGNIFICANT CHANGE UP (ref 0.7–2)
LACTATE SERPL-SCNC: 1.2 MMOL/L — SIGNIFICANT CHANGE UP (ref 0.7–2)
LDH SERPL L TO P-CCNC: 105 U/L — LOW (ref 120–225)
LEUKOCYTE ESTERASE UR-ACNC: ABNORMAL
LYMPHOCYTES # BLD AUTO: 1.85 K/UL — SIGNIFICANT CHANGE UP (ref 1–3.3)
LYMPHOCYTES # BLD AUTO: 26.2 % — SIGNIFICANT CHANGE UP (ref 13–44)
MAGNESIUM SERPL-MCNC: 1.9 MG/DL — SIGNIFICANT CHANGE UP (ref 1.6–2.6)
MCHC RBC-ENTMCNC: 29.3 PG — SIGNIFICANT CHANGE UP (ref 27–34)
MCHC RBC-ENTMCNC: 29.4 PG — SIGNIFICANT CHANGE UP (ref 27–34)
MCHC RBC-ENTMCNC: 29.5 PG — SIGNIFICANT CHANGE UP (ref 27–34)
MCHC RBC-ENTMCNC: 32.3 G/DL — SIGNIFICANT CHANGE UP (ref 32–36)
MCHC RBC-ENTMCNC: 32.8 G/DL — SIGNIFICANT CHANGE UP (ref 32–36)
MCHC RBC-ENTMCNC: 32.9 G/DL — SIGNIFICANT CHANGE UP (ref 32–36)
MCV RBC AUTO: 89.4 FL — SIGNIFICANT CHANGE UP (ref 80–100)
MCV RBC AUTO: 89.8 FL — SIGNIFICANT CHANGE UP (ref 80–100)
MCV RBC AUTO: 90.6 FL — SIGNIFICANT CHANGE UP (ref 80–100)
MONOCYTES # BLD AUTO: 0.83 K/UL — SIGNIFICANT CHANGE UP (ref 0–0.9)
MONOCYTES NFR BLD AUTO: 11.8 % — SIGNIFICANT CHANGE UP (ref 2–14)
NEUTROPHILS # BLD AUTO: 4.24 K/UL — SIGNIFICANT CHANGE UP (ref 1.8–7.4)
NEUTROPHILS NFR BLD AUTO: 60 % — SIGNIFICANT CHANGE UP (ref 43–77)
NITRITE UR-MCNC: POSITIVE
NRBC BLD AUTO-RTO: 0 /100 WBCS — SIGNIFICANT CHANGE UP (ref 0–0)
PH UR: 6 — SIGNIFICANT CHANGE UP (ref 5–8)
PHOSPHATE SERPL-MCNC: 2.5 MG/DL — SIGNIFICANT CHANGE UP (ref 2.5–4.5)
PLATELET # BLD AUTO: 291 K/UL — SIGNIFICANT CHANGE UP (ref 150–400)
PLATELET # BLD AUTO: 301 K/UL — SIGNIFICANT CHANGE UP (ref 150–400)
PLATELET # BLD AUTO: 333 K/UL — SIGNIFICANT CHANGE UP (ref 150–400)
POTASSIUM SERPL-MCNC: 3.4 MMOL/L — LOW (ref 3.5–5.3)
POTASSIUM SERPL-SCNC: 3.4 MMOL/L — LOW (ref 3.5–5.3)
PROT UR-MCNC: 100 MG/DL
RBC # BLD: 3.6 M/UL — LOW (ref 4.2–5.8)
RBC # BLD: 3.6 M/UL — LOW (ref 4.2–5.8)
RBC # BLD: 3.62 M/UL — LOW (ref 4.2–5.8)
RBC # BLD: 3.63 M/UL — LOW (ref 4.2–5.8)
RBC # FLD: 15 % — HIGH (ref 10.3–14.5)
RBC # FLD: 15.1 % — HIGH (ref 10.3–14.5)
RBC # FLD: 15.2 % — HIGH (ref 10.3–14.5)
RBC CASTS # UR COMP ASSIST: 5 /HPF — HIGH (ref 0–4)
RETICS #: 48.3 K/UL — SIGNIFICANT CHANGE UP (ref 25–125)
RETICS/RBC NFR: 1.4 % — SIGNIFICANT CHANGE UP (ref 0.5–2.5)
SODIUM SERPL-SCNC: 138 MMOL/L — SIGNIFICANT CHANGE UP (ref 135–145)
SP GR SPEC: 1.06 — HIGH (ref 1–1.03)
TIBC SERPL-MCNC: 321 UG/DL — SIGNIFICANT CHANGE UP (ref 250–450)
TRANSFERRIN SERPL-MCNC: 269 MG/DL — SIGNIFICANT CHANGE UP (ref 200–360)
TSH SERPL-MCNC: 4.08 UU/ML — SIGNIFICANT CHANGE UP (ref 0.34–4.82)
UIBC SERPL-MCNC: 262 UG/DL — SIGNIFICANT CHANGE UP (ref 110–370)
UROBILINOGEN FLD QL: 0.2 MG/DL — SIGNIFICANT CHANGE UP (ref 0.2–1)
VIT B12 SERPL-MCNC: 485 PG/ML — SIGNIFICANT CHANGE UP (ref 232–1245)
WBC # BLD: 10.13 K/UL — SIGNIFICANT CHANGE UP (ref 3.8–10.5)
WBC # BLD: 6.5 K/UL — SIGNIFICANT CHANGE UP (ref 3.8–10.5)
WBC # BLD: 7.06 K/UL — SIGNIFICANT CHANGE UP (ref 3.8–10.5)
WBC # FLD AUTO: 10.13 K/UL — SIGNIFICANT CHANGE UP (ref 3.8–10.5)
WBC # FLD AUTO: 6.5 K/UL — SIGNIFICANT CHANGE UP (ref 3.8–10.5)
WBC # FLD AUTO: 7.06 K/UL — SIGNIFICANT CHANGE UP (ref 3.8–10.5)
WBC UR QL: ABNORMAL /HPF (ref 0–5)

## 2025-07-26 PROCEDURE — 99239 HOSP IP/OBS DSCHRG MGMT >30: CPT | Mod: GC

## 2025-07-26 RX ORDER — HYDROMORPHONE/SOD CHLOR,ISO/PF 2 MG/10 ML
1 SYRINGE (ML) INJECTION ONCE
Refills: 0 | Status: DISCONTINUED | OUTPATIENT
Start: 2025-07-26 | End: 2025-07-26

## 2025-07-26 RX ORDER — BICTEGRAVIR SODIUM, EMTRICITABINE, AND TENOFOVIR ALAFENAMIDE FUMARATE 50; 200; 25 MG/1; MG/1; MG/1
1 TABLET ORAL
Qty: 0 | Refills: 0 | DISCHARGE
Start: 2025-07-26

## 2025-07-26 RX ORDER — CLONAZEPAM 0.5 MG/1
1 TABLET ORAL
Qty: 0 | Refills: 0 | DISCHARGE
Start: 2025-07-26

## 2025-07-26 RX ORDER — CEFEPIME 2 G/20ML
2 INJECTION, POWDER, FOR SOLUTION INTRAVENOUS
Qty: 0 | Refills: 0 | DISCHARGE
Start: 2025-07-26

## 2025-07-26 RX ORDER — LACTULOSE 10 G/15ML
15 SOLUTION ORAL
Qty: 0 | Refills: 0 | DISCHARGE
Start: 2025-07-26

## 2025-07-26 RX ORDER — MIRABEGRON 50 MG/1
1 TABLET, FILM COATED, EXTENDED RELEASE ORAL
Refills: 0 | DISCHARGE

## 2025-07-26 RX ADMIN — Medication 1 MILLIGRAM(S): at 11:13

## 2025-07-26 RX ADMIN — BUPROPION HYDROBROMIDE 150 MILLIGRAM(S): 522 TABLET, EXTENDED RELEASE ORAL at 12:01

## 2025-07-26 RX ADMIN — BICTEGRAVIR SODIUM, EMTRICITABINE, AND TENOFOVIR ALAFENAMIDE FUMARATE 1 TABLET(S): 50; 200; 25 TABLET ORAL at 12:01

## 2025-07-26 RX ADMIN — Medication 1 MILLIGRAM(S): at 04:50

## 2025-07-26 RX ADMIN — EZETIMIBE 10 MILLIGRAM(S): 10 TABLET ORAL at 21:09

## 2025-07-26 RX ADMIN — Medication 1 MILLIGRAM(S): at 22:34

## 2025-07-26 RX ADMIN — CEFEPIME 100 MILLIGRAM(S): 2 INJECTION, POWDER, FOR SOLUTION INTRAVENOUS at 17:38

## 2025-07-26 RX ADMIN — POLYETHYLENE GLYCOL 3350 17 GRAM(S): 17 POWDER, FOR SOLUTION ORAL at 06:13

## 2025-07-26 RX ADMIN — DILTIAZEM HYDROCHLORIDE 240 MILLIGRAM(S): 240 TABLET, EXTENDED RELEASE ORAL at 06:13

## 2025-07-26 RX ADMIN — Medication 1000 MILLIGRAM(S): at 07:09

## 2025-07-26 RX ADMIN — Medication 1000 MILLIGRAM(S): at 22:12

## 2025-07-26 RX ADMIN — Medication 1 MILLIGRAM(S): at 16:21

## 2025-07-26 RX ADMIN — Medication 1 MILLIGRAM(S): at 10:42

## 2025-07-26 RX ADMIN — Medication 1000 MILLIGRAM(S): at 21:09

## 2025-07-26 RX ADMIN — Medication 1000 MILLIGRAM(S): at 06:13

## 2025-07-26 RX ADMIN — Medication 1 MILLIGRAM(S): at 03:50

## 2025-07-26 RX ADMIN — Medication 1 MILLIGRAM(S): at 17:12

## 2025-07-26 RX ADMIN — CEFEPIME 100 MILLIGRAM(S): 2 INJECTION, POWDER, FOR SOLUTION INTRAVENOUS at 06:12

## 2025-07-26 RX ADMIN — ENOXAPARIN SODIUM 40 MILLIGRAM(S): 100 INJECTION SUBCUTANEOUS at 06:13

## 2025-07-26 RX ADMIN — LACTULOSE 10 GRAM(S): 10 SOLUTION ORAL at 06:13

## 2025-07-26 RX ADMIN — Medication 1 MILLIGRAM(S): at 23:30

## 2025-07-26 RX ADMIN — Medication 3 MILLIGRAM(S): at 21:09

## 2025-07-27 ENCOUNTER — INPATIENT (INPATIENT)
Facility: HOSPITAL | Age: 60
LOS: 3 days | Discharge: HOME | End: 2025-07-31
Attending: STUDENT IN AN ORGANIZED HEALTH CARE EDUCATION/TRAINING PROGRAM | Admitting: STUDENT IN AN ORGANIZED HEALTH CARE EDUCATION/TRAINING PROGRAM
Payer: MEDICAID

## 2025-07-27 VITALS
HEART RATE: 90 BPM | SYSTOLIC BLOOD PRESSURE: 137 MMHG | TEMPERATURE: 99 F | OXYGEN SATURATION: 99 % | DIASTOLIC BLOOD PRESSURE: 89 MMHG | RESPIRATION RATE: 17 BRPM

## 2025-07-27 VITALS
RESPIRATION RATE: 18 BRPM | DIASTOLIC BLOOD PRESSURE: 85 MMHG | SYSTOLIC BLOOD PRESSURE: 126 MMHG | HEART RATE: 108 BPM | OXYGEN SATURATION: 96 % | TEMPERATURE: 98 F

## 2025-07-27 DIAGNOSIS — N50.89 OTHER SPECIFIED DISORDERS OF THE MALE GENITAL ORGANS: ICD-10-CM

## 2025-07-27 DIAGNOSIS — B20 HUMAN IMMUNODEFICIENCY VIRUS [HIV] DISEASE: ICD-10-CM

## 2025-07-27 DIAGNOSIS — Z29.9 ENCOUNTER FOR PROPHYLACTIC MEASURES, UNSPECIFIED: ICD-10-CM

## 2025-07-27 DIAGNOSIS — F41.1 GENERALIZED ANXIETY DISORDER: ICD-10-CM

## 2025-07-27 DIAGNOSIS — Z93.6 OTHER ARTIFICIAL OPENINGS OF URINARY TRACT STATUS: Chronic | ICD-10-CM

## 2025-07-27 DIAGNOSIS — Z85.048 PERSONAL HISTORY OF OTHER MALIGNANT NEOPLASM OF RECTUM, RECTOSIGMOID JUNCTION, AND ANUS: ICD-10-CM

## 2025-07-27 DIAGNOSIS — T83.092A OTHER MECHANICAL COMPLICATION OF NEPHROSTOMY CATHETER, INITIAL ENCOUNTER: ICD-10-CM

## 2025-07-27 DIAGNOSIS — N28.9 DISORDER OF KIDNEY AND URETER, UNSPECIFIED: ICD-10-CM

## 2025-07-27 DIAGNOSIS — I10 ESSENTIAL (PRIMARY) HYPERTENSION: ICD-10-CM

## 2025-07-27 DIAGNOSIS — D50.0 IRON DEFICIENCY ANEMIA SECONDARY TO BLOOD LOSS (CHRONIC): ICD-10-CM

## 2025-07-27 DIAGNOSIS — N39.0 URINARY TRACT INFECTION, SITE NOT SPECIFIED: ICD-10-CM

## 2025-07-27 DIAGNOSIS — Z93.3 COLOSTOMY STATUS: Chronic | ICD-10-CM

## 2025-07-27 DIAGNOSIS — R10.9 UNSPECIFIED ABDOMINAL PAIN: ICD-10-CM

## 2025-07-27 LAB
-  AMIKACIN: SIGNIFICANT CHANGE UP
-  AMIKACIN: SIGNIFICANT CHANGE UP
-  AMPICILLIN: SIGNIFICANT CHANGE UP
-  AZTREONAM: SIGNIFICANT CHANGE UP
-  AZTREONAM: SIGNIFICANT CHANGE UP
-  CEFEPIME: SIGNIFICANT CHANGE UP
-  CEFEPIME: SIGNIFICANT CHANGE UP
-  CEFTAZIDIME: SIGNIFICANT CHANGE UP
-  CEFTAZIDIME: SIGNIFICANT CHANGE UP
-  CIPROFLOXACIN: SIGNIFICANT CHANGE UP
-  IMIPENEM: SIGNIFICANT CHANGE UP
-  IMIPENEM: SIGNIFICANT CHANGE UP
-  LEVOFLOXACIN: SIGNIFICANT CHANGE UP
-  MEROPENEM: SIGNIFICANT CHANGE UP
-  MEROPENEM: SIGNIFICANT CHANGE UP
-  NITROFURANTOIN: SIGNIFICANT CHANGE UP
-  PIPERACILLIN/TAZOBACTAM: SIGNIFICANT CHANGE UP
-  PIPERACILLIN/TAZOBACTAM: SIGNIFICANT CHANGE UP
-  TETRACYCLINE: SIGNIFICANT CHANGE UP
-  VANCOMYCIN: SIGNIFICANT CHANGE UP
ANION GAP SERPL CALC-SCNC: 4 MMOL/L — LOW (ref 5–17)
BASOPHILS # BLD AUTO: 0.06 K/UL — SIGNIFICANT CHANGE UP (ref 0–0.2)
BASOPHILS NFR BLD AUTO: 1 % — SIGNIFICANT CHANGE UP (ref 0–2)
BUN SERPL-MCNC: 9 MG/DL — SIGNIFICANT CHANGE UP (ref 7–18)
CALCIUM SERPL-MCNC: 8.6 MG/DL — SIGNIFICANT CHANGE UP (ref 8.4–10.5)
CHLORIDE SERPL-SCNC: 107 MMOL/L — SIGNIFICANT CHANGE UP (ref 96–108)
CO2 SERPL-SCNC: 25 MMOL/L — SIGNIFICANT CHANGE UP (ref 22–31)
CREAT SERPL-MCNC: 0.72 MG/DL — SIGNIFICANT CHANGE UP (ref 0.5–1.3)
CULTURE RESULTS: ABNORMAL
EGFR: 105 ML/MIN/1.73M2 — SIGNIFICANT CHANGE UP
EGFR: 105 ML/MIN/1.73M2 — SIGNIFICANT CHANGE UP
EOSINOPHIL # BLD AUTO: 0.07 K/UL — SIGNIFICANT CHANGE UP (ref 0–0.5)
EOSINOPHIL NFR BLD AUTO: 1.1 % — SIGNIFICANT CHANGE UP (ref 0–6)
GLUCOSE SERPL-MCNC: 106 MG/DL — HIGH (ref 70–99)
HCT VFR BLD CALC: 33.7 % — LOW (ref 39–50)
HGB BLD-MCNC: 10.9 G/DL — LOW (ref 13–17)
IMM GRANULOCYTES NFR BLD AUTO: 0.5 % — SIGNIFICANT CHANGE UP (ref 0–0.9)
LYMPHOCYTES # BLD AUTO: 1.36 K/UL — SIGNIFICANT CHANGE UP (ref 1–3.3)
LYMPHOCYTES # BLD AUTO: 22.2 % — SIGNIFICANT CHANGE UP (ref 13–44)
MAGNESIUM SERPL-MCNC: 2 MG/DL — SIGNIFICANT CHANGE UP (ref 1.6–2.6)
MCHC RBC-ENTMCNC: 29.1 PG — SIGNIFICANT CHANGE UP (ref 27–34)
MCHC RBC-ENTMCNC: 32.3 G/DL — SIGNIFICANT CHANGE UP (ref 32–36)
MCV RBC AUTO: 89.9 FL — SIGNIFICANT CHANGE UP (ref 80–100)
METHOD TYPE: SIGNIFICANT CHANGE UP
MONOCYTES # BLD AUTO: 0.89 K/UL — SIGNIFICANT CHANGE UP (ref 0–0.9)
MONOCYTES NFR BLD AUTO: 14.5 % — HIGH (ref 2–14)
NEUTROPHILS # BLD AUTO: 3.72 K/UL — SIGNIFICANT CHANGE UP (ref 1.8–7.4)
NEUTROPHILS NFR BLD AUTO: 60.7 % — SIGNIFICANT CHANGE UP (ref 43–77)
NRBC BLD AUTO-RTO: 0 /100 WBCS — SIGNIFICANT CHANGE UP (ref 0–0)
ORGANISM # SPEC MICROSCOPIC CNT: ABNORMAL
PHOSPHATE SERPL-MCNC: 2.3 MG/DL — LOW (ref 2.5–4.5)
PLATELET # BLD AUTO: 316 K/UL — SIGNIFICANT CHANGE UP (ref 150–400)
POTASSIUM SERPL-MCNC: 3.6 MMOL/L — SIGNIFICANT CHANGE UP (ref 3.5–5.3)
POTASSIUM SERPL-SCNC: 3.6 MMOL/L — SIGNIFICANT CHANGE UP (ref 3.5–5.3)
RBC # BLD: 3.75 M/UL — LOW (ref 4.2–5.8)
RBC # FLD: 15.2 % — HIGH (ref 10.3–14.5)
SODIUM SERPL-SCNC: 136 MMOL/L — SIGNIFICANT CHANGE UP (ref 135–145)
SPECIMEN SOURCE: SIGNIFICANT CHANGE UP
WBC # BLD: 6.13 K/UL — SIGNIFICANT CHANGE UP (ref 3.8–10.5)
WBC # FLD AUTO: 6.13 K/UL — SIGNIFICANT CHANGE UP (ref 3.8–10.5)

## 2025-07-27 PROCEDURE — 85045 AUTOMATED RETICULOCYTE COUNT: CPT

## 2025-07-27 PROCEDURE — 87086 URINE CULTURE/COLONY COUNT: CPT

## 2025-07-27 PROCEDURE — 36415 COLL VENOUS BLD VENIPUNCTURE: CPT

## 2025-07-27 PROCEDURE — 84443 ASSAY THYROID STIM HORMONE: CPT

## 2025-07-27 PROCEDURE — 85025 COMPLETE CBC W/AUTO DIFF WBC: CPT

## 2025-07-27 PROCEDURE — 85610 PROTHROMBIN TIME: CPT

## 2025-07-27 PROCEDURE — 83550 IRON BINDING TEST: CPT

## 2025-07-27 PROCEDURE — 86850 RBC ANTIBODY SCREEN: CPT

## 2025-07-27 PROCEDURE — 99285 EMERGENCY DEPT VISIT HI MDM: CPT

## 2025-07-27 PROCEDURE — 82607 VITAMIN B-12: CPT

## 2025-07-27 PROCEDURE — 85730 THROMBOPLASTIN TIME PARTIAL: CPT

## 2025-07-27 PROCEDURE — 83010 ASSAY OF HAPTOGLOBIN QUANT: CPT

## 2025-07-27 PROCEDURE — 71045 X-RAY EXAM CHEST 1 VIEW: CPT

## 2025-07-27 PROCEDURE — 82962 GLUCOSE BLOOD TEST: CPT

## 2025-07-27 PROCEDURE — 96374 THER/PROPH/DIAG INJ IV PUSH: CPT

## 2025-07-27 PROCEDURE — 80048 BASIC METABOLIC PNL TOTAL CA: CPT

## 2025-07-27 PROCEDURE — 82728 ASSAY OF FERRITIN: CPT

## 2025-07-27 PROCEDURE — 86900 BLOOD TYPING SEROLOGIC ABO: CPT

## 2025-07-27 PROCEDURE — 86901 BLOOD TYPING SEROLOGIC RH(D): CPT

## 2025-07-27 PROCEDURE — 74177 CT ABD & PELVIS W/CONTRAST: CPT

## 2025-07-27 PROCEDURE — 99223 1ST HOSP IP/OBS HIGH 75: CPT | Mod: GC

## 2025-07-27 PROCEDURE — 84484 ASSAY OF TROPONIN QUANT: CPT

## 2025-07-27 PROCEDURE — 81001 URINALYSIS AUTO W/SCOPE: CPT

## 2025-07-27 PROCEDURE — 83690 ASSAY OF LIPASE: CPT

## 2025-07-27 PROCEDURE — 74174 CTA ABD&PLVS W/CONTRAST: CPT

## 2025-07-27 PROCEDURE — 83605 ASSAY OF LACTIC ACID: CPT

## 2025-07-27 PROCEDURE — 85027 COMPLETE CBC AUTOMATED: CPT

## 2025-07-27 PROCEDURE — 83540 ASSAY OF IRON: CPT

## 2025-07-27 PROCEDURE — 83615 LACTATE (LD) (LDH) ENZYME: CPT

## 2025-07-27 PROCEDURE — 84100 ASSAY OF PHOSPHORUS: CPT

## 2025-07-27 PROCEDURE — 80053 COMPREHEN METABOLIC PANEL: CPT

## 2025-07-27 PROCEDURE — 87077 CULTURE AEROBIC IDENTIFY: CPT

## 2025-07-27 PROCEDURE — 83735 ASSAY OF MAGNESIUM: CPT

## 2025-07-27 PROCEDURE — 84466 ASSAY OF TRANSFERRIN: CPT

## 2025-07-27 PROCEDURE — 96375 TX/PRO/DX INJ NEW DRUG ADDON: CPT

## 2025-07-27 PROCEDURE — 87186 SC STD MICRODIL/AGAR DIL: CPT

## 2025-07-27 PROCEDURE — 99222 1ST HOSP IP/OBS MODERATE 55: CPT

## 2025-07-27 PROCEDURE — 82746 ASSAY OF FOLIC ACID SERUM: CPT

## 2025-07-27 RX ORDER — PIPERACILLIN-TAZO-DEXTROSE,ISO 3.375G/5
3.38 IV SOLUTION, PIGGYBACK PREMIX FROZEN(ML) INTRAVENOUS ONCE
Refills: 0 | Status: COMPLETED | OUTPATIENT
Start: 2025-07-27 | End: 2025-07-27

## 2025-07-27 RX ORDER — BICTEGRAVIR SODIUM, EMTRICITABINE, AND TENOFOVIR ALAFENAMIDE FUMARATE 50; 200; 25 MG/1; MG/1; MG/1
1 TABLET ORAL DAILY
Refills: 0 | Status: DISCONTINUED | OUTPATIENT
Start: 2025-07-27 | End: 2025-07-31

## 2025-07-27 RX ORDER — MELATONIN 5 MG
3 TABLET ORAL AT BEDTIME
Refills: 0 | Status: DISCONTINUED | OUTPATIENT
Start: 2025-07-27 | End: 2025-07-31

## 2025-07-27 RX ORDER — HYDROMORPHONE/SOD CHLOR,ISO/PF 2 MG/10 ML
1 SYRINGE (ML) INJECTION ONCE
Refills: 0 | Status: DISCONTINUED | OUTPATIENT
Start: 2025-07-27 | End: 2025-07-27

## 2025-07-27 RX ORDER — CEFEPIME 2 G/20ML
2000 INJECTION, POWDER, FOR SOLUTION INTRAVENOUS EVERY 12 HOURS
Refills: 0 | Status: DISCONTINUED | OUTPATIENT
Start: 2025-07-27 | End: 2025-07-27

## 2025-07-27 RX ORDER — PIPERACILLIN-TAZO-DEXTROSE,ISO 3.375G/5
3.38 IV SOLUTION, PIGGYBACK PREMIX FROZEN(ML) INTRAVENOUS EVERY 8 HOURS
Refills: 0 | Status: DISCONTINUED | OUTPATIENT
Start: 2025-07-28 | End: 2025-07-31

## 2025-07-27 RX ORDER — TRAZODONE HCL 100 MG
50 TABLET ORAL AT BEDTIME
Refills: 0 | Status: DISCONTINUED | OUTPATIENT
Start: 2025-07-27 | End: 2025-07-31

## 2025-07-27 RX ORDER — HYDROMORPHONE/SOD CHLOR,ISO/PF 2 MG/10 ML
2 SYRINGE (ML) INJECTION EVERY 4 HOURS
Refills: 0 | Status: DISCONTINUED | OUTPATIENT
Start: 2025-07-27 | End: 2025-07-27

## 2025-07-27 RX ORDER — HYDROMORPHONE/SOD CHLOR,ISO/PF 2 MG/10 ML
4 SYRINGE (ML) INJECTION EVERY 4 HOURS
Refills: 0 | Status: DISCONTINUED | OUTPATIENT
Start: 2025-07-27 | End: 2025-07-27

## 2025-07-27 RX ORDER — EZETIMIBE 10 MG/1
10 TABLET ORAL AT BEDTIME
Refills: 0 | Status: DISCONTINUED | OUTPATIENT
Start: 2025-07-27 | End: 2025-07-31

## 2025-07-27 RX ORDER — HYDROMORPHONE/SOD CHLOR,ISO/PF 2 MG/10 ML
2 SYRINGE (ML) INJECTION EVERY 4 HOURS
Refills: 0 | Status: DISCONTINUED | OUTPATIENT
Start: 2025-07-27 | End: 2025-07-31

## 2025-07-27 RX ORDER — MAGNESIUM HYDROXIDE 400 MG/5ML
30 SUSPENSION ORAL
Refills: 0 | Status: DISCONTINUED | OUTPATIENT
Start: 2025-07-27 | End: 2025-07-31

## 2025-07-27 RX ORDER — CLONAZEPAM 0.5 MG/1
1 TABLET ORAL
Refills: 0 | Status: DISCONTINUED | OUTPATIENT
Start: 2025-07-27 | End: 2025-07-27

## 2025-07-27 RX ORDER — ACETAMINOPHEN 500 MG/5ML
1000 LIQUID (ML) ORAL EVERY 8 HOURS
Refills: 0 | Status: DISCONTINUED | OUTPATIENT
Start: 2025-07-27 | End: 2025-07-31

## 2025-07-27 RX ORDER — CLONAZEPAM 0.5 MG/1
1 TABLET ORAL
Refills: 0 | Status: DISCONTINUED | OUTPATIENT
Start: 2025-07-27 | End: 2025-07-31

## 2025-07-27 RX ORDER — POLYETHYLENE GLYCOL 3350 17 G/17G
17 POWDER, FOR SOLUTION ORAL
Refills: 0 | Status: DISCONTINUED | OUTPATIENT
Start: 2025-07-27 | End: 2025-07-30

## 2025-07-27 RX ORDER — HYDROMORPHONE/SOD CHLOR,ISO/PF 2 MG/10 ML
4 SYRINGE (ML) INJECTION EVERY 4 HOURS
Refills: 0 | Status: DISCONTINUED | OUTPATIENT
Start: 2025-07-27 | End: 2025-07-31

## 2025-07-27 RX ORDER — MAGNESIUM, ALUMINUM HYDROXIDE 200-200 MG
30 TABLET,CHEWABLE ORAL EVERY 4 HOURS
Refills: 0 | Status: DISCONTINUED | OUTPATIENT
Start: 2025-07-27 | End: 2025-07-28

## 2025-07-27 RX ORDER — ONDANSETRON HCL/PF 4 MG/2 ML
4 VIAL (ML) INJECTION EVERY 8 HOURS
Refills: 0 | Status: DISCONTINUED | OUTPATIENT
Start: 2025-07-27 | End: 2025-07-31

## 2025-07-27 RX ORDER — LIDOCAINE HYDROCHLORIDE 20 MG/ML
1 JELLY TOPICAL DAILY
Refills: 0 | Status: DISCONTINUED | OUTPATIENT
Start: 2025-07-27 | End: 2025-07-31

## 2025-07-27 RX ORDER — PIPERACILLIN-TAZO-DEXTROSE,ISO 3.375G/5
3.38 IV SOLUTION, PIGGYBACK PREMIX FROZEN(ML) INTRAVENOUS EVERY 6 HOURS
Refills: 0 | Status: DISCONTINUED | OUTPATIENT
Start: 2025-07-27 | End: 2025-07-27

## 2025-07-27 RX ORDER — BUPROPION HYDROBROMIDE 522 MG/1
150 TABLET, EXTENDED RELEASE ORAL DAILY
Refills: 0 | Status: DISCONTINUED | OUTPATIENT
Start: 2025-07-27 | End: 2025-07-31

## 2025-07-27 RX ADMIN — DILTIAZEM HYDROCHLORIDE 240 MILLIGRAM(S): 240 TABLET, EXTENDED RELEASE ORAL at 05:56

## 2025-07-27 RX ADMIN — BICTEGRAVIR SODIUM, EMTRICITABINE, AND TENOFOVIR ALAFENAMIDE FUMARATE 1 TABLET(S): 50; 200; 25 TABLET ORAL at 11:21

## 2025-07-27 RX ADMIN — Medication 3 MILLIGRAM(S): at 21:12

## 2025-07-27 RX ADMIN — EZETIMIBE 10 MILLIGRAM(S): 10 TABLET ORAL at 21:12

## 2025-07-27 RX ADMIN — Medication 1000 MILLIGRAM(S): at 22:12

## 2025-07-27 RX ADMIN — Medication 25 GRAM(S): at 22:28

## 2025-07-27 RX ADMIN — LIDOCAINE HYDROCHLORIDE 1 PATCH: 20 JELLY TOPICAL at 15:42

## 2025-07-27 RX ADMIN — Medication 4 MILLIGRAM(S): at 16:25

## 2025-07-27 RX ADMIN — Medication 4 MILLIGRAM(S): at 15:29

## 2025-07-27 RX ADMIN — Medication 50 MILLIGRAM(S): at 21:12

## 2025-07-27 RX ADMIN — Medication 200 GRAM(S): at 18:29

## 2025-07-27 RX ADMIN — Medication 1 MILLIGRAM(S): at 06:41

## 2025-07-27 RX ADMIN — Medication 1 MILLIGRAM(S): at 07:09

## 2025-07-27 RX ADMIN — BUPROPION HYDROBROMIDE 150 MILLIGRAM(S): 522 TABLET, EXTENDED RELEASE ORAL at 11:21

## 2025-07-27 RX ADMIN — MAGNESIUM HYDROXIDE 30 MILLILITER(S): 400 SUSPENSION ORAL at 18:29

## 2025-07-27 RX ADMIN — POLYETHYLENE GLYCOL 3350 17 GRAM(S): 17 POWDER, FOR SOLUTION ORAL at 18:29

## 2025-07-27 RX ADMIN — Medication 1000 MILLIGRAM(S): at 21:12

## 2025-07-27 RX ADMIN — CEFEPIME 100 MILLIGRAM(S): 2 INJECTION, POWDER, FOR SOLUTION INTRAVENOUS at 05:57

## 2025-07-28 ENCOUNTER — APPOINTMENT (OUTPATIENT)
Dept: ULTRASOUND IMAGING | Facility: CLINIC | Age: 60
End: 2025-07-28

## 2025-07-28 LAB
-  AMIKACIN: SIGNIFICANT CHANGE UP
-  AMPICILLIN: SIGNIFICANT CHANGE UP
-  AMPICILLIN: SIGNIFICANT CHANGE UP
-  AZTREONAM: SIGNIFICANT CHANGE UP
-  CEFEPIME: SIGNIFICANT CHANGE UP
-  CEFTAZIDIME: SIGNIFICANT CHANGE UP
-  CIPROFLOXACIN: SIGNIFICANT CHANGE UP
-  IMIPENEM: SIGNIFICANT CHANGE UP
-  LEVOFLOXACIN: SIGNIFICANT CHANGE UP
-  MEROPENEM: SIGNIFICANT CHANGE UP
-  NITROFURANTOIN: SIGNIFICANT CHANGE UP
-  NITROFURANTOIN: SIGNIFICANT CHANGE UP
-  PIPERACILLIN/TAZOBACTAM: SIGNIFICANT CHANGE UP
-  TETRACYCLINE: SIGNIFICANT CHANGE UP
-  TETRACYCLINE: SIGNIFICANT CHANGE UP
-  VANCOMYCIN: SIGNIFICANT CHANGE UP
-  VANCOMYCIN: SIGNIFICANT CHANGE UP
ALBUMIN SERPL ELPH-MCNC: 3.9 G/DL — SIGNIFICANT CHANGE UP (ref 3.3–5)
ALP SERPL-CCNC: 89 U/L — SIGNIFICANT CHANGE UP (ref 40–120)
ALT FLD-CCNC: 10 U/L — SIGNIFICANT CHANGE UP (ref 4–41)
ANION GAP SERPL CALC-SCNC: 12 MMOL/L — SIGNIFICANT CHANGE UP (ref 7–14)
APTT BLD: 30.4 SEC — SIGNIFICANT CHANGE UP (ref 26.1–36.8)
AST SERPL-CCNC: 14 U/L — SIGNIFICANT CHANGE UP (ref 4–40)
BILIRUB SERPL-MCNC: 0.2 MG/DL — SIGNIFICANT CHANGE UP (ref 0.2–1.2)
BUN SERPL-MCNC: 10 MG/DL — SIGNIFICANT CHANGE UP (ref 7–23)
CALCIUM SERPL-MCNC: 9.3 MG/DL — SIGNIFICANT CHANGE UP (ref 8.4–10.5)
CHLORIDE SERPL-SCNC: 103 MMOL/L — SIGNIFICANT CHANGE UP (ref 98–107)
CO2 SERPL-SCNC: 23 MMOL/L — SIGNIFICANT CHANGE UP (ref 22–31)
CREAT SERPL-MCNC: 0.78 MG/DL — SIGNIFICANT CHANGE UP (ref 0.5–1.3)
CULTURE RESULTS: ABNORMAL
CULTURE RESULTS: ABNORMAL
EGFR: 103 ML/MIN/1.73M2 — SIGNIFICANT CHANGE UP
EGFR: 103 ML/MIN/1.73M2 — SIGNIFICANT CHANGE UP
GLUCOSE SERPL-MCNC: 109 MG/DL — HIGH (ref 70–99)
HCT VFR BLD CALC: 35.5 % — LOW (ref 39–50)
HCT VFR BLD CALC: 35.7 % — LOW (ref 39–50)
HGB BLD-MCNC: 11.5 G/DL — LOW (ref 13–17)
HGB BLD-MCNC: 11.7 G/DL — LOW (ref 13–17)
INR BLD: 1.01 RATIO — SIGNIFICANT CHANGE UP (ref 0.85–1.16)
MAGNESIUM SERPL-MCNC: 2.4 MG/DL — SIGNIFICANT CHANGE UP (ref 1.6–2.6)
MCHC RBC-ENTMCNC: 29.1 PG — SIGNIFICANT CHANGE UP (ref 27–34)
MCHC RBC-ENTMCNC: 29.6 PG — SIGNIFICANT CHANGE UP (ref 27–34)
MCHC RBC-ENTMCNC: 32.2 G/DL — SIGNIFICANT CHANGE UP (ref 32–36)
MCHC RBC-ENTMCNC: 33 G/DL — SIGNIFICANT CHANGE UP (ref 32–36)
MCV RBC AUTO: 89.9 FL — SIGNIFICANT CHANGE UP (ref 80–100)
MCV RBC AUTO: 90.4 FL — SIGNIFICANT CHANGE UP (ref 80–100)
METHOD TYPE: SIGNIFICANT CHANGE UP
NRBC # BLD AUTO: 0 K/UL — SIGNIFICANT CHANGE UP (ref 0–0)
NRBC # BLD AUTO: 0 K/UL — SIGNIFICANT CHANGE UP (ref 0–0)
NRBC # FLD: 0 K/UL — SIGNIFICANT CHANGE UP (ref 0–0)
NRBC # FLD: 0 K/UL — SIGNIFICANT CHANGE UP (ref 0–0)
NRBC BLD AUTO-RTO: 0 /100 WBCS — SIGNIFICANT CHANGE UP (ref 0–0)
NRBC BLD AUTO-RTO: 0 /100 WBCS — SIGNIFICANT CHANGE UP (ref 0–0)
ORGANISM # SPEC MICROSCOPIC CNT: ABNORMAL
PHOSPHATE SERPL-MCNC: 3.1 MG/DL — SIGNIFICANT CHANGE UP (ref 2.5–4.5)
PLATELET # BLD AUTO: 339 K/UL — SIGNIFICANT CHANGE UP (ref 150–400)
PLATELET # BLD AUTO: 365 K/UL — SIGNIFICANT CHANGE UP (ref 150–400)
PMV BLD: 9.4 FL — SIGNIFICANT CHANGE UP (ref 7–13)
PMV BLD: 9.9 FL — SIGNIFICANT CHANGE UP (ref 7–13)
POTASSIUM SERPL-MCNC: 3.9 MMOL/L — SIGNIFICANT CHANGE UP (ref 3.5–5.3)
POTASSIUM SERPL-SCNC: 3.9 MMOL/L — SIGNIFICANT CHANGE UP (ref 3.5–5.3)
PROT SERPL-MCNC: 7.2 G/DL — SIGNIFICANT CHANGE UP (ref 6–8.3)
PROTHROM AB SERPL-ACNC: 12 SEC — SIGNIFICANT CHANGE UP (ref 9.9–13.4)
RBC # BLD: 3.95 M/UL — LOW (ref 4.2–5.8)
RBC # BLD: 3.95 M/UL — LOW (ref 4.2–5.8)
RBC # FLD: 14.7 % — HIGH (ref 10.3–14.5)
RBC # FLD: 14.9 % — HIGH (ref 10.3–14.5)
SODIUM SERPL-SCNC: 138 MMOL/L — SIGNIFICANT CHANGE UP (ref 135–145)
SPECIMEN SOURCE: SIGNIFICANT CHANGE UP
SPECIMEN SOURCE: SIGNIFICANT CHANGE UP
WBC # BLD: 6.55 K/UL — SIGNIFICANT CHANGE UP (ref 3.8–10.5)
WBC # BLD: 8.02 K/UL — SIGNIFICANT CHANGE UP (ref 3.8–10.5)
WBC # FLD AUTO: 6.55 K/UL — SIGNIFICANT CHANGE UP (ref 3.8–10.5)
WBC # FLD AUTO: 8.02 K/UL — SIGNIFICANT CHANGE UP (ref 3.8–10.5)

## 2025-07-28 PROCEDURE — G0545: CPT

## 2025-07-28 PROCEDURE — 99232 SBSQ HOSP IP/OBS MODERATE 35: CPT

## 2025-07-28 PROCEDURE — 99222 1ST HOSP IP/OBS MODERATE 55: CPT

## 2025-07-28 PROCEDURE — 76870 US EXAM SCROTUM: CPT | Mod: 26

## 2025-07-28 PROCEDURE — 50435 EXCHANGE NEPHROSTOMY CATH: CPT | Mod: RT

## 2025-07-28 RX ORDER — HYDROMORPHONE/SOD CHLOR,ISO/PF 2 MG/10 ML
2 SYRINGE (ML) INJECTION ONCE
Refills: 0 | Status: DISCONTINUED | OUTPATIENT
Start: 2025-07-28 | End: 2025-07-28

## 2025-07-28 RX ORDER — HYDROMORPHONE/SOD CHLOR,ISO/PF 2 MG/10 ML
1 SYRINGE (ML) INJECTION ONCE
Refills: 0 | Status: DISCONTINUED | OUTPATIENT
Start: 2025-07-28 | End: 2025-07-28

## 2025-07-28 RX ORDER — ACETAMINOPHEN 500 MG/5ML
1000 LIQUID (ML) ORAL ONCE
Refills: 0 | Status: DISCONTINUED | OUTPATIENT
Start: 2025-07-28 | End: 2025-07-28

## 2025-07-28 RX ADMIN — MAGNESIUM HYDROXIDE 30 MILLILITER(S): 400 SUSPENSION ORAL at 18:03

## 2025-07-28 RX ADMIN — Medication 4 MILLIGRAM(S): at 02:25

## 2025-07-28 RX ADMIN — Medication 1 MILLIGRAM(S): at 15:20

## 2025-07-28 RX ADMIN — EZETIMIBE 10 MILLIGRAM(S): 10 TABLET ORAL at 21:51

## 2025-07-28 RX ADMIN — Medication 25 GRAM(S): at 06:13

## 2025-07-28 RX ADMIN — Medication 25 GRAM(S): at 15:27

## 2025-07-28 RX ADMIN — BUPROPION HYDROBROMIDE 150 MILLIGRAM(S): 522 TABLET, EXTENDED RELEASE ORAL at 12:16

## 2025-07-28 RX ADMIN — Medication 25 GRAM(S): at 22:34

## 2025-07-28 RX ADMIN — Medication 4 MILLIGRAM(S): at 03:25

## 2025-07-28 RX ADMIN — Medication 2 MILLIGRAM(S): at 04:15

## 2025-07-28 RX ADMIN — Medication 5 MILLIGRAM(S): at 22:33

## 2025-07-28 RX ADMIN — Medication 50 MILLIGRAM(S): at 21:51

## 2025-07-28 RX ADMIN — Medication 2 MILLIGRAM(S): at 03:45

## 2025-07-28 RX ADMIN — Medication 1000 MILLIGRAM(S): at 15:27

## 2025-07-28 RX ADMIN — BICTEGRAVIR SODIUM, EMTRICITABINE, AND TENOFOVIR ALAFENAMIDE FUMARATE 1 TABLET(S): 50; 200; 25 TABLET ORAL at 12:16

## 2025-07-28 RX ADMIN — Medication 3 MILLIGRAM(S): at 21:51

## 2025-07-28 RX ADMIN — LIDOCAINE HYDROCHLORIDE 1 PATCH: 20 JELLY TOPICAL at 05:00

## 2025-07-28 RX ADMIN — Medication 1 MILLIGRAM(S): at 14:02

## 2025-07-28 RX ADMIN — Medication 1000 MILLIGRAM(S): at 07:13

## 2025-07-28 RX ADMIN — POLYETHYLENE GLYCOL 3350 17 GRAM(S): 17 POWDER, FOR SOLUTION ORAL at 18:03

## 2025-07-28 RX ADMIN — POLYETHYLENE GLYCOL 3350 17 GRAM(S): 17 POWDER, FOR SOLUTION ORAL at 06:15

## 2025-07-28 RX ADMIN — Medication 1000 MILLIGRAM(S): at 06:13

## 2025-07-28 RX ADMIN — MAGNESIUM HYDROXIDE 30 MILLILITER(S): 400 SUSPENSION ORAL at 06:15

## 2025-07-29 LAB
ALBUMIN SERPL ELPH-MCNC: 3.8 G/DL — SIGNIFICANT CHANGE UP (ref 3.3–5)
ALP SERPL-CCNC: 85 U/L — SIGNIFICANT CHANGE UP (ref 40–120)
ALT FLD-CCNC: 11 U/L — SIGNIFICANT CHANGE UP (ref 4–41)
ANION GAP SERPL CALC-SCNC: 11 MMOL/L — SIGNIFICANT CHANGE UP (ref 7–14)
AST SERPL-CCNC: 12 U/L — SIGNIFICANT CHANGE UP (ref 4–40)
BILIRUB SERPL-MCNC: <0.2 MG/DL — SIGNIFICANT CHANGE UP (ref 0.2–1.2)
BUN SERPL-MCNC: 11 MG/DL — SIGNIFICANT CHANGE UP (ref 7–23)
CALCIUM SERPL-MCNC: 8.9 MG/DL — SIGNIFICANT CHANGE UP (ref 8.4–10.5)
CHLORIDE SERPL-SCNC: 100 MMOL/L — SIGNIFICANT CHANGE UP (ref 98–107)
CO2 SERPL-SCNC: 24 MMOL/L — SIGNIFICANT CHANGE UP (ref 22–31)
CREAT SERPL-MCNC: 0.84 MG/DL — SIGNIFICANT CHANGE UP (ref 0.5–1.3)
EGFR: 100 ML/MIN/1.73M2 — SIGNIFICANT CHANGE UP
EGFR: 100 ML/MIN/1.73M2 — SIGNIFICANT CHANGE UP
GLUCOSE SERPL-MCNC: 114 MG/DL — HIGH (ref 70–99)
HCT VFR BLD CALC: 31.9 % — LOW (ref 39–50)
HGB BLD-MCNC: 10.5 G/DL — LOW (ref 13–17)
MCHC RBC-ENTMCNC: 29.6 PG — SIGNIFICANT CHANGE UP (ref 27–34)
MCHC RBC-ENTMCNC: 32.9 G/DL — SIGNIFICANT CHANGE UP (ref 32–36)
MCV RBC AUTO: 89.9 FL — SIGNIFICANT CHANGE UP (ref 80–100)
NRBC # BLD AUTO: 0.02 K/UL — HIGH (ref 0–0)
NRBC # FLD: 0.02 K/UL — HIGH (ref 0–0)
NRBC BLD AUTO-RTO: 0 /100 WBCS — SIGNIFICANT CHANGE UP (ref 0–0)
PLATELET # BLD AUTO: 328 K/UL — SIGNIFICANT CHANGE UP (ref 150–400)
PMV BLD: 9.4 FL — SIGNIFICANT CHANGE UP (ref 7–13)
POTASSIUM SERPL-MCNC: 4.1 MMOL/L — SIGNIFICANT CHANGE UP (ref 3.5–5.3)
POTASSIUM SERPL-SCNC: 4.1 MMOL/L — SIGNIFICANT CHANGE UP (ref 3.5–5.3)
PROT SERPL-MCNC: 6.9 G/DL — SIGNIFICANT CHANGE UP (ref 6–8.3)
RBC # BLD: 3.55 M/UL — LOW (ref 4.2–5.8)
RBC # FLD: 14.7 % — HIGH (ref 10.3–14.5)
SODIUM SERPL-SCNC: 135 MMOL/L — SIGNIFICANT CHANGE UP (ref 135–145)
WBC # BLD: 7.03 K/UL — SIGNIFICANT CHANGE UP (ref 3.8–10.5)
WBC # FLD AUTO: 7.03 K/UL — SIGNIFICANT CHANGE UP (ref 3.8–10.5)

## 2025-07-29 PROCEDURE — 99232 SBSQ HOSP IP/OBS MODERATE 35: CPT

## 2025-07-29 PROCEDURE — 93010 ELECTROCARDIOGRAM REPORT: CPT

## 2025-07-29 PROCEDURE — G0545: CPT

## 2025-07-29 RX ORDER — HYDROMORPHONE/SOD CHLOR,ISO/PF 2 MG/10 ML
1 SYRINGE (ML) INJECTION EVERY 4 HOURS
Refills: 0 | Status: DISCONTINUED | OUTPATIENT
Start: 2025-07-29 | End: 2025-07-31

## 2025-07-29 RX ORDER — HYDROMORPHONE/SOD CHLOR,ISO/PF 2 MG/10 ML
2 SYRINGE (ML) INJECTION ONCE
Refills: 0 | Status: DISCONTINUED | OUTPATIENT
Start: 2025-07-29 | End: 2025-07-29

## 2025-07-29 RX ADMIN — Medication 25 GRAM(S): at 14:22

## 2025-07-29 RX ADMIN — Medication 4 MILLIGRAM(S): at 15:47

## 2025-07-29 RX ADMIN — Medication 1000 MILLIGRAM(S): at 06:18

## 2025-07-29 RX ADMIN — Medication 4 MILLIGRAM(S): at 14:22

## 2025-07-29 RX ADMIN — BUPROPION HYDROBROMIDE 150 MILLIGRAM(S): 522 TABLET, EXTENDED RELEASE ORAL at 13:26

## 2025-07-29 RX ADMIN — Medication 25 GRAM(S): at 06:18

## 2025-07-29 RX ADMIN — Medication 1000 MILLIGRAM(S): at 13:26

## 2025-07-29 RX ADMIN — Medication 5 MILLIGRAM(S): at 21:49

## 2025-07-29 RX ADMIN — BICTEGRAVIR SODIUM, EMTRICITABINE, AND TENOFOVIR ALAFENAMIDE FUMARATE 1 TABLET(S): 50; 200; 25 TABLET ORAL at 14:21

## 2025-07-29 RX ADMIN — Medication 1 MILLIGRAM(S): at 17:47

## 2025-07-29 RX ADMIN — Medication 1000 MILLIGRAM(S): at 14:00

## 2025-07-29 RX ADMIN — Medication 2 MILLIGRAM(S): at 03:53

## 2025-07-29 RX ADMIN — Medication 1 MILLIGRAM(S): at 17:15

## 2025-07-29 RX ADMIN — Medication 50 MILLIGRAM(S): at 21:50

## 2025-07-29 RX ADMIN — Medication 1000 MILLIGRAM(S): at 07:18

## 2025-07-29 RX ADMIN — EZETIMIBE 10 MILLIGRAM(S): 10 TABLET ORAL at 21:49

## 2025-07-29 RX ADMIN — CLONAZEPAM 1 MILLIGRAM(S): 0.5 TABLET ORAL at 13:26

## 2025-07-29 RX ADMIN — Medication 25 GRAM(S): at 21:50

## 2025-07-29 RX ADMIN — Medication 2 MILLIGRAM(S): at 04:23

## 2025-07-29 RX ADMIN — Medication 1000 MILLIGRAM(S): at 21:49

## 2025-07-30 ENCOUNTER — TRANSCRIPTION ENCOUNTER (OUTPATIENT)
Age: 60
End: 2025-07-30

## 2025-07-30 LAB
ALBUMIN SERPL ELPH-MCNC: 3.8 G/DL — SIGNIFICANT CHANGE UP (ref 3.3–5)
ALP SERPL-CCNC: 78 U/L — SIGNIFICANT CHANGE UP (ref 40–120)
ALT FLD-CCNC: 10 U/L — SIGNIFICANT CHANGE UP (ref 4–41)
ANION GAP SERPL CALC-SCNC: 11 MMOL/L — SIGNIFICANT CHANGE UP (ref 7–14)
AST SERPL-CCNC: 13 U/L — SIGNIFICANT CHANGE UP (ref 4–40)
BILIRUB SERPL-MCNC: <0.2 MG/DL — SIGNIFICANT CHANGE UP (ref 0.2–1.2)
BUN SERPL-MCNC: 12 MG/DL — SIGNIFICANT CHANGE UP (ref 7–23)
CALCIUM SERPL-MCNC: 8.9 MG/DL — SIGNIFICANT CHANGE UP (ref 8.4–10.5)
CHLORIDE SERPL-SCNC: 103 MMOL/L — SIGNIFICANT CHANGE UP (ref 98–107)
CO2 SERPL-SCNC: 22 MMOL/L — SIGNIFICANT CHANGE UP (ref 22–31)
CREAT SERPL-MCNC: 0.9 MG/DL — SIGNIFICANT CHANGE UP (ref 0.5–1.3)
EGFR: 98 ML/MIN/1.73M2 — SIGNIFICANT CHANGE UP
EGFR: 98 ML/MIN/1.73M2 — SIGNIFICANT CHANGE UP
GLUCOSE SERPL-MCNC: 89 MG/DL — SIGNIFICANT CHANGE UP (ref 70–99)
HCT VFR BLD CALC: 30.8 % — LOW (ref 39–50)
HGB BLD-MCNC: 9.9 G/DL — LOW (ref 13–17)
MCHC RBC-ENTMCNC: 29 PG — SIGNIFICANT CHANGE UP (ref 27–34)
MCHC RBC-ENTMCNC: 32.1 G/DL — SIGNIFICANT CHANGE UP (ref 32–36)
MCV RBC AUTO: 90.3 FL — SIGNIFICANT CHANGE UP (ref 80–100)
MRSA PCR RESULT.: SIGNIFICANT CHANGE UP
NRBC # BLD AUTO: 0 K/UL — SIGNIFICANT CHANGE UP (ref 0–0)
NRBC # FLD: 0 K/UL — SIGNIFICANT CHANGE UP (ref 0–0)
NRBC BLD AUTO-RTO: 0 /100 WBCS — SIGNIFICANT CHANGE UP (ref 0–0)
PLATELET # BLD AUTO: 339 K/UL — SIGNIFICANT CHANGE UP (ref 150–400)
PMV BLD: 10.1 FL — SIGNIFICANT CHANGE UP (ref 7–13)
POTASSIUM SERPL-MCNC: 4.1 MMOL/L — SIGNIFICANT CHANGE UP (ref 3.5–5.3)
POTASSIUM SERPL-SCNC: 4.1 MMOL/L — SIGNIFICANT CHANGE UP (ref 3.5–5.3)
PROT SERPL-MCNC: 6.9 G/DL — SIGNIFICANT CHANGE UP (ref 6–8.3)
RBC # BLD: 3.41 M/UL — LOW (ref 4.2–5.8)
RBC # FLD: 14.9 % — HIGH (ref 10.3–14.5)
S AUREUS DNA NOSE QL NAA+PROBE: SIGNIFICANT CHANGE UP
SODIUM SERPL-SCNC: 136 MMOL/L — SIGNIFICANT CHANGE UP (ref 135–145)
WBC # BLD: 5.56 K/UL — SIGNIFICANT CHANGE UP (ref 3.8–10.5)
WBC # FLD AUTO: 5.56 K/UL — SIGNIFICANT CHANGE UP (ref 3.8–10.5)

## 2025-07-30 PROCEDURE — 99232 SBSQ HOSP IP/OBS MODERATE 35: CPT

## 2025-07-30 RX ORDER — POLYETHYLENE GLYCOL 3350 17 G/17G
17 POWDER, FOR SOLUTION ORAL DAILY
Refills: 0 | Status: DISCONTINUED | OUTPATIENT
Start: 2025-07-31 | End: 2025-07-31

## 2025-07-30 RX ORDER — HYDROMORPHONE/SOD CHLOR,ISO/PF 2 MG/10 ML
1 SYRINGE (ML) INJECTION ONCE
Refills: 0 | Status: DISCONTINUED | OUTPATIENT
Start: 2025-07-30 | End: 2025-07-30

## 2025-07-30 RX ORDER — DILTIAZEM HYDROCHLORIDE 240 MG/1
240 TABLET, EXTENDED RELEASE ORAL DAILY
Refills: 0 | Status: DISCONTINUED | OUTPATIENT
Start: 2025-07-30 | End: 2025-07-31

## 2025-07-30 RX ORDER — METOPROLOL SUCCINATE 50 MG/1
100 TABLET, EXTENDED RELEASE ORAL DAILY
Refills: 0 | Status: DISCONTINUED | OUTPATIENT
Start: 2025-07-30 | End: 2025-07-31

## 2025-07-30 RX ADMIN — Medication 25 GRAM(S): at 05:16

## 2025-07-30 RX ADMIN — Medication 1000 MILLIGRAM(S): at 14:06

## 2025-07-30 RX ADMIN — BUPROPION HYDROBROMIDE 150 MILLIGRAM(S): 522 TABLET, EXTENDED RELEASE ORAL at 11:23

## 2025-07-30 RX ADMIN — LIDOCAINE HYDROCHLORIDE 1 PATCH: 20 JELLY TOPICAL at 11:23

## 2025-07-30 RX ADMIN — Medication 4 MILLIGRAM(S): at 18:20

## 2025-07-30 RX ADMIN — Medication 25 GRAM(S): at 21:58

## 2025-07-30 RX ADMIN — Medication 1 MILLIGRAM(S): at 03:47

## 2025-07-30 RX ADMIN — Medication 4 MILLIGRAM(S): at 02:39

## 2025-07-30 RX ADMIN — Medication 4 MILLIGRAM(S): at 22:52

## 2025-07-30 RX ADMIN — LIDOCAINE HYDROCHLORIDE 1 PATCH: 20 JELLY TOPICAL at 23:28

## 2025-07-30 RX ADMIN — Medication 4 MILLIGRAM(S): at 21:52

## 2025-07-30 RX ADMIN — EZETIMIBE 10 MILLIGRAM(S): 10 TABLET ORAL at 23:28

## 2025-07-30 RX ADMIN — BICTEGRAVIR SODIUM, EMTRICITABINE, AND TENOFOVIR ALAFENAMIDE FUMARATE 1 TABLET(S): 50; 200; 25 TABLET ORAL at 11:23

## 2025-07-30 RX ADMIN — Medication 4 MILLIGRAM(S): at 19:20

## 2025-07-30 RX ADMIN — Medication 1000 MILLIGRAM(S): at 06:16

## 2025-07-30 RX ADMIN — Medication 50 MILLIGRAM(S): at 21:54

## 2025-07-30 RX ADMIN — Medication 5 MILLIGRAM(S): at 21:54

## 2025-07-30 RX ADMIN — Medication 4 MILLIGRAM(S): at 03:39

## 2025-07-30 RX ADMIN — Medication 1000 MILLIGRAM(S): at 21:52

## 2025-07-30 RX ADMIN — Medication 1 MILLIGRAM(S): at 11:24

## 2025-07-30 RX ADMIN — Medication 1000 MILLIGRAM(S): at 13:06

## 2025-07-30 RX ADMIN — Medication 1000 MILLIGRAM(S): at 22:52

## 2025-07-30 RX ADMIN — Medication 1000 MILLIGRAM(S): at 05:16

## 2025-07-30 RX ADMIN — Medication 1 MILLIGRAM(S): at 04:47

## 2025-07-30 RX ADMIN — LIDOCAINE HYDROCHLORIDE 1 PATCH: 20 JELLY TOPICAL at 18:40

## 2025-07-30 RX ADMIN — Medication 1 MILLIGRAM(S): at 20:42

## 2025-07-30 RX ADMIN — Medication 25 GRAM(S): at 13:06

## 2025-07-30 RX ADMIN — Medication 1 MILLIGRAM(S): at 21:42

## 2025-07-30 RX ADMIN — Medication 1 MILLIGRAM(S): at 12:24

## 2025-07-30 RX ADMIN — Medication 1 APPLICATION(S): at 11:28

## 2025-07-31 ENCOUNTER — TRANSCRIPTION ENCOUNTER (OUTPATIENT)
Age: 60
End: 2025-07-31

## 2025-07-31 VITALS
HEART RATE: 86 BPM | SYSTOLIC BLOOD PRESSURE: 130 MMHG | DIASTOLIC BLOOD PRESSURE: 80 MMHG | RESPIRATION RATE: 17 BRPM | TEMPERATURE: 98 F | OXYGEN SATURATION: 100 %

## 2025-07-31 LAB
ALBUMIN SERPL ELPH-MCNC: 3.8 G/DL — SIGNIFICANT CHANGE UP (ref 3.3–5)
ALP SERPL-CCNC: 76 U/L — SIGNIFICANT CHANGE UP (ref 40–120)
ALT FLD-CCNC: 10 U/L — SIGNIFICANT CHANGE UP (ref 4–41)
ANION GAP SERPL CALC-SCNC: 11 MMOL/L — SIGNIFICANT CHANGE UP (ref 7–14)
AST SERPL-CCNC: 13 U/L — SIGNIFICANT CHANGE UP (ref 4–40)
BILIRUB SERPL-MCNC: <0.2 MG/DL — SIGNIFICANT CHANGE UP (ref 0.2–1.2)
BUN SERPL-MCNC: 15 MG/DL — SIGNIFICANT CHANGE UP (ref 7–23)
CALCIUM SERPL-MCNC: 8.8 MG/DL — SIGNIFICANT CHANGE UP (ref 8.4–10.5)
CHLORIDE SERPL-SCNC: 104 MMOL/L — SIGNIFICANT CHANGE UP (ref 98–107)
CO2 SERPL-SCNC: 23 MMOL/L — SIGNIFICANT CHANGE UP (ref 22–31)
CREAT SERPL-MCNC: 0.86 MG/DL — SIGNIFICANT CHANGE UP (ref 0.5–1.3)
EGFR: 100 ML/MIN/1.73M2 — SIGNIFICANT CHANGE UP
EGFR: 100 ML/MIN/1.73M2 — SIGNIFICANT CHANGE UP
GLUCOSE SERPL-MCNC: 78 MG/DL — SIGNIFICANT CHANGE UP (ref 70–99)
HCT VFR BLD CALC: 30.7 % — LOW (ref 39–50)
HGB BLD-MCNC: 10 G/DL — LOW (ref 13–17)
MAGNESIUM SERPL-MCNC: 2.2 MG/DL — SIGNIFICANT CHANGE UP (ref 1.6–2.6)
MCHC RBC-ENTMCNC: 29.6 PG — SIGNIFICANT CHANGE UP (ref 27–34)
MCHC RBC-ENTMCNC: 32.6 G/DL — SIGNIFICANT CHANGE UP (ref 32–36)
MCV RBC AUTO: 90.8 FL — SIGNIFICANT CHANGE UP (ref 80–100)
NRBC # BLD AUTO: 0 K/UL — SIGNIFICANT CHANGE UP (ref 0–0)
NRBC # FLD: 0 K/UL — SIGNIFICANT CHANGE UP (ref 0–0)
NRBC BLD AUTO-RTO: 0 /100 WBCS — SIGNIFICANT CHANGE UP (ref 0–0)
PHOSPHATE SERPL-MCNC: 2.9 MG/DL — SIGNIFICANT CHANGE UP (ref 2.5–4.5)
PLATELET # BLD AUTO: 346 K/UL — SIGNIFICANT CHANGE UP (ref 150–400)
PMV BLD: 9.7 FL — SIGNIFICANT CHANGE UP (ref 7–13)
POTASSIUM SERPL-MCNC: 4.2 MMOL/L — SIGNIFICANT CHANGE UP (ref 3.5–5.3)
POTASSIUM SERPL-SCNC: 4.2 MMOL/L — SIGNIFICANT CHANGE UP (ref 3.5–5.3)
PROT SERPL-MCNC: 6.7 G/DL — SIGNIFICANT CHANGE UP (ref 6–8.3)
RBC # BLD: 3.38 M/UL — LOW (ref 4.2–5.8)
RBC # FLD: 14.7 % — HIGH (ref 10.3–14.5)
SODIUM SERPL-SCNC: 138 MMOL/L — SIGNIFICANT CHANGE UP (ref 135–145)
WBC # BLD: 5.65 K/UL — SIGNIFICANT CHANGE UP (ref 3.8–10.5)
WBC # FLD AUTO: 5.65 K/UL — SIGNIFICANT CHANGE UP (ref 3.8–10.5)

## 2025-07-31 PROCEDURE — 99239 HOSP IP/OBS DSCHRG MGMT >30: CPT

## 2025-07-31 RX ORDER — HYDROMORPHONE/SOD CHLOR,ISO/PF 2 MG/10 ML
1 SYRINGE (ML) INJECTION
Qty: 18 | Refills: 0
Start: 2025-07-31 | End: 2025-08-02

## 2025-07-31 RX ORDER — NALOXONE HYDROCHLORIDE 0.4 MG/ML
1 INJECTION, SOLUTION INTRAMUSCULAR; INTRAVENOUS; SUBCUTANEOUS
Qty: 2 | Refills: 0
Start: 2025-07-31 | End: 2025-08-29

## 2025-07-31 RX ORDER — AMOXICILLIN 500 MG/1
1 CAPSULE ORAL
Qty: 6 | Refills: 0
Start: 2025-07-31 | End: 2025-08-01

## 2025-07-31 RX ORDER — NALOXONE HYDROCHLORIDE 0.4 MG/ML
1 INJECTION, SOLUTION INTRAMUSCULAR; INTRAVENOUS; SUBCUTANEOUS
Qty: 1 | Refills: 0
Start: 2025-07-31 | End: 2025-08-06

## 2025-07-31 RX ORDER — CIPROFLOXACIN HCL 250 MG
1 TABLET ORAL
Qty: 4 | Refills: 0
Start: 2025-07-31 | End: 2025-08-01

## 2025-07-31 RX ORDER — ACETAMINOPHEN 500 MG/5ML
2 LIQUID (ML) ORAL
Qty: 0 | Refills: 0 | DISCHARGE
Start: 2025-07-31

## 2025-07-31 RX ORDER — HYDROMORPHONE/SOD CHLOR,ISO/PF 2 MG/10 ML
1 SYRINGE (ML) INJECTION
Refills: 0 | DISCHARGE

## 2025-07-31 RX ORDER — LIDOCAINE HYDROCHLORIDE 20 MG/ML
1 JELLY TOPICAL
Qty: 7 | Refills: 0
Start: 2025-07-31 | End: 2025-08-06

## 2025-07-31 RX ADMIN — BICTEGRAVIR SODIUM, EMTRICITABINE, AND TENOFOVIR ALAFENAMIDE FUMARATE 1 TABLET(S): 50; 200; 25 TABLET ORAL at 11:17

## 2025-07-31 RX ADMIN — Medication 4 MILLIGRAM(S): at 10:02

## 2025-07-31 RX ADMIN — Medication 4 MILLIGRAM(S): at 06:29

## 2025-07-31 RX ADMIN — Medication 4 MILLIGRAM(S): at 05:29

## 2025-07-31 RX ADMIN — BUPROPION HYDROBROMIDE 150 MILLIGRAM(S): 522 TABLET, EXTENDED RELEASE ORAL at 11:17

## 2025-07-31 RX ADMIN — Medication 1000 MILLIGRAM(S): at 06:25

## 2025-07-31 RX ADMIN — Medication 1 MILLIGRAM(S): at 11:16

## 2025-07-31 RX ADMIN — DILTIAZEM HYDROCHLORIDE 240 MILLIGRAM(S): 240 TABLET, EXTENDED RELEASE ORAL at 06:15

## 2025-07-31 RX ADMIN — Medication 25 GRAM(S): at 13:05

## 2025-07-31 RX ADMIN — MAGNESIUM HYDROXIDE 30 MILLILITER(S): 400 SUSPENSION ORAL at 06:20

## 2025-07-31 RX ADMIN — Medication 1000 MILLIGRAM(S): at 07:25

## 2025-07-31 RX ADMIN — METOPROLOL SUCCINATE 100 MILLIGRAM(S): 50 TABLET, EXTENDED RELEASE ORAL at 06:18

## 2025-07-31 RX ADMIN — Medication 25 GRAM(S): at 06:26

## 2025-07-31 RX ADMIN — Medication 4 MILLIGRAM(S): at 11:02

## 2025-07-31 RX ADMIN — Medication 1 MILLIGRAM(S): at 12:16

## 2025-07-31 RX ADMIN — Medication 1 MILLIGRAM(S): at 06:45

## 2025-07-31 RX ADMIN — Medication 1 APPLICATION(S): at 11:19

## 2025-07-31 RX ADMIN — Medication 1 MILLIGRAM(S): at 07:45

## 2025-08-06 ENCOUNTER — APPOINTMENT (OUTPATIENT)
Dept: INTERNAL MEDICINE | Facility: CLINIC | Age: 60
End: 2025-08-06
Payer: MEDICAID

## 2025-08-06 VITALS
BODY MASS INDEX: 25.06 KG/M2 | DIASTOLIC BLOOD PRESSURE: 71 MMHG | SYSTOLIC BLOOD PRESSURE: 104 MMHG | HEART RATE: 69 BPM | WEIGHT: 179 LBS | HEIGHT: 71 IN | TEMPERATURE: 97.8 F | OXYGEN SATURATION: 99 %

## 2025-08-06 DIAGNOSIS — Z09 ENCOUNTER FOR FOLLOW-UP EXAMINATION AFTER COMPLETED TREATMENT FOR CONDITIONS OTHER THAN MALIGNANT NEOPLASM: ICD-10-CM

## 2025-08-06 DIAGNOSIS — R10.13 EPIGASTRIC PAIN: ICD-10-CM

## 2025-08-06 DIAGNOSIS — Z96.0 PRESENCE OF UROGENITAL IMPLANTS: ICD-10-CM

## 2025-08-06 DIAGNOSIS — N39.0 URINARY TRACT INFECTION, SITE NOT SPECIFIED: ICD-10-CM

## 2025-08-06 PROCEDURE — 99496 TRANSJ CARE MGMT HIGH F2F 7D: CPT

## 2025-08-06 RX ORDER — SULFAMETHOXAZOLE AND TRIMETHOPRIM 800; 160 MG/1; MG/1
800-160 TABLET ORAL TWICE DAILY
Qty: 10 | Refills: 0 | Status: ACTIVE | COMMUNITY
Start: 2025-08-06 | End: 1900-01-01

## 2025-08-06 RX ORDER — ISOPROPYL ALCOHOL 0.7 ML/ML
SWAB TOPICAL
Qty: 3 | Refills: 3 | Status: ACTIVE | COMMUNITY
Start: 2025-08-06 | End: 1900-01-01

## 2025-08-07 ENCOUNTER — APPOINTMENT (OUTPATIENT)
Dept: INFECTIOUS DISEASE | Facility: CLINIC | Age: 60
End: 2025-08-07

## 2025-08-07 PROCEDURE — 99213 OFFICE O/P EST LOW 20 MIN: CPT | Mod: 95

## 2025-08-07 RX ORDER — MIRTAZAPINE 7.5 MG/1
7.5 TABLET, FILM COATED ORAL
Qty: 30 | Refills: 3 | Status: ACTIVE | COMMUNITY
Start: 2025-08-07 | End: 1900-01-01

## 2025-08-12 ENCOUNTER — APPOINTMENT (OUTPATIENT)
Dept: GERIATRICS | Facility: CLINIC | Age: 60
End: 2025-08-12

## 2025-08-12 DIAGNOSIS — C21.0 MALIGNANT NEOPLASM OF ANUS, UNSPECIFIED: ICD-10-CM

## 2025-08-12 DIAGNOSIS — N48.89 OTHER SPECIFIED DISORDERS OF PENIS: ICD-10-CM

## 2025-08-12 DIAGNOSIS — F41.9 ANXIETY DISORDER, UNSPECIFIED: ICD-10-CM

## 2025-08-12 DIAGNOSIS — C61 MALIGNANT NEOPLASM OF PROSTATE: ICD-10-CM

## 2025-08-12 DIAGNOSIS — Z51.5 ENCOUNTER FOR PALLIATIVE CARE: ICD-10-CM

## 2025-08-12 DIAGNOSIS — G47.00 INSOMNIA, UNSPECIFIED: ICD-10-CM

## 2025-08-12 PROCEDURE — 99215 OFFICE O/P EST HI 40 MIN: CPT | Mod: 95

## 2025-08-14 ENCOUNTER — RX RENEWAL (OUTPATIENT)
Age: 60
End: 2025-08-14

## 2025-08-14 ENCOUNTER — APPOINTMENT (OUTPATIENT)
Dept: UROLOGY | Facility: CLINIC | Age: 60
End: 2025-08-14
Payer: MEDICAID

## 2025-08-14 VITALS
SYSTOLIC BLOOD PRESSURE: 104 MMHG | HEART RATE: 64 BPM | DIASTOLIC BLOOD PRESSURE: 73 MMHG | OXYGEN SATURATION: 96 % | TEMPERATURE: 97.3 F | BODY MASS INDEX: 25.11 KG/M2 | WEIGHT: 180 LBS

## 2025-08-14 DIAGNOSIS — N32.81 OVERACTIVE BLADDER: ICD-10-CM

## 2025-08-14 DIAGNOSIS — N13.30 UNSPECIFIED HYDRONEPHROSIS: ICD-10-CM

## 2025-08-14 DIAGNOSIS — Z93.6 OTHER ARTIFICIAL OPENINGS OF URINARY TRACT STATUS: ICD-10-CM

## 2025-08-14 PROCEDURE — 99214 OFFICE O/P EST MOD 30 MIN: CPT

## 2025-08-14 PROCEDURE — G2211 COMPLEX E/M VISIT ADD ON: CPT | Mod: NC

## 2025-08-15 ENCOUNTER — APPOINTMENT (OUTPATIENT)
Dept: GASTROENTEROLOGY | Facility: CLINIC | Age: 60
End: 2025-08-15
Payer: MEDICAID

## 2025-08-15 VITALS
HEART RATE: 59 BPM | OXYGEN SATURATION: 96 % | SYSTOLIC BLOOD PRESSURE: 105 MMHG | DIASTOLIC BLOOD PRESSURE: 74 MMHG | TEMPERATURE: 97.8 F | HEIGHT: 71 IN

## 2025-08-15 DIAGNOSIS — K59.03 DRUG INDUCED CONSTIPATION: ICD-10-CM

## 2025-08-15 DIAGNOSIS — T40.2X5A DRUG INDUCED CONSTIPATION: ICD-10-CM

## 2025-08-15 DIAGNOSIS — K59.09 OTHER CONSTIPATION: ICD-10-CM

## 2025-08-15 PROCEDURE — 99214 OFFICE O/P EST MOD 30 MIN: CPT

## 2025-08-15 RX ORDER — NALOXEGOL OXALATE 25 MG/1
25 TABLET, FILM COATED ORAL
Qty: 1 | Refills: 0 | Status: ACTIVE | COMMUNITY
Start: 2025-08-15 | End: 1900-01-01

## 2025-08-18 ENCOUNTER — NON-APPOINTMENT (OUTPATIENT)
Age: 60
End: 2025-08-18

## 2025-08-18 ENCOUNTER — INPATIENT (INPATIENT)
Facility: HOSPITAL | Age: 60
LOS: 0 days | Discharge: ANOTHER HOSPITAL | DRG: 696 | End: 2025-08-19
Attending: STUDENT IN AN ORGANIZED HEALTH CARE EDUCATION/TRAINING PROGRAM | Admitting: STUDENT IN AN ORGANIZED HEALTH CARE EDUCATION/TRAINING PROGRAM
Payer: MEDICAID

## 2025-08-18 VITALS
HEIGHT: 71 IN | HEART RATE: 75 BPM | SYSTOLIC BLOOD PRESSURE: 104 MMHG | RESPIRATION RATE: 18 BRPM | OXYGEN SATURATION: 99 % | WEIGHT: 190.04 LBS | TEMPERATURE: 98 F | DIASTOLIC BLOOD PRESSURE: 72 MMHG

## 2025-08-18 DIAGNOSIS — R31.9 HEMATURIA, UNSPECIFIED: ICD-10-CM

## 2025-08-18 DIAGNOSIS — Z93.6 OTHER ARTIFICIAL OPENINGS OF URINARY TRACT STATUS: Chronic | ICD-10-CM

## 2025-08-18 DIAGNOSIS — Z93.3 COLOSTOMY STATUS: Chronic | ICD-10-CM

## 2025-08-18 LAB
ALBUMIN SERPL ELPH-MCNC: 3 G/DL — LOW (ref 3.5–5)
ALP SERPL-CCNC: 74 U/L — SIGNIFICANT CHANGE UP (ref 40–120)
ALT FLD-CCNC: 17 U/L DA — SIGNIFICANT CHANGE UP (ref 10–60)
ANION GAP SERPL CALC-SCNC: 2 MMOL/L — LOW (ref 5–17)
APPEARANCE UR: ABNORMAL
AST SERPL-CCNC: 40 U/L — SIGNIFICANT CHANGE UP (ref 10–40)
BASOPHILS # BLD AUTO: 0.04 K/UL — SIGNIFICANT CHANGE UP (ref 0–0.2)
BASOPHILS NFR BLD AUTO: 0.6 % — SIGNIFICANT CHANGE UP (ref 0–2)
BILIRUB SERPL-MCNC: 0.2 MG/DL — SIGNIFICANT CHANGE UP (ref 0.2–1.2)
BILIRUB UR-MCNC: NEGATIVE — SIGNIFICANT CHANGE UP
BUN SERPL-MCNC: 22 MG/DL — HIGH (ref 7–18)
CALCIUM SERPL-MCNC: 9.3 MG/DL — SIGNIFICANT CHANGE UP (ref 8.4–10.5)
CHLORIDE SERPL-SCNC: 104 MMOL/L — SIGNIFICANT CHANGE UP (ref 96–108)
CO2 SERPL-SCNC: 32 MMOL/L — HIGH (ref 22–31)
COLOR SPEC: YELLOW — SIGNIFICANT CHANGE UP
CREAT SERPL-MCNC: 1.13 MG/DL — SIGNIFICANT CHANGE UP (ref 0.5–1.3)
DIFF PNL FLD: ABNORMAL
EGFR: 75 ML/MIN/1.73M2 — SIGNIFICANT CHANGE UP
EGFR: 75 ML/MIN/1.73M2 — SIGNIFICANT CHANGE UP
EOSINOPHIL # BLD AUTO: 0.05 K/UL — SIGNIFICANT CHANGE UP (ref 0–0.5)
EOSINOPHIL NFR BLD AUTO: 0.7 % — SIGNIFICANT CHANGE UP (ref 0–6)
GLUCOSE SERPL-MCNC: 99 MG/DL — SIGNIFICANT CHANGE UP (ref 70–99)
GLUCOSE UR QL: NEGATIVE MG/DL — SIGNIFICANT CHANGE UP
HCT VFR BLD CALC: 30.4 % — LOW (ref 39–50)
HGB BLD-MCNC: 9.6 G/DL — LOW (ref 13–17)
IMM GRANULOCYTES NFR BLD AUTO: 0.4 % — SIGNIFICANT CHANGE UP (ref 0–0.9)
KETONES UR QL: NEGATIVE MG/DL — SIGNIFICANT CHANGE UP
LACTATE SERPL-SCNC: 5 MMOL/L — CRITICAL HIGH (ref 0.7–2)
LEUKOCYTE ESTERASE UR-ACNC: ABNORMAL
LIDOCAIN IGE QN: 16 U/L — SIGNIFICANT CHANGE UP (ref 13–75)
LYMPHOCYTES # BLD AUTO: 2.17 K/UL — SIGNIFICANT CHANGE UP (ref 1–3.3)
LYMPHOCYTES # BLD AUTO: 30.4 % — SIGNIFICANT CHANGE UP (ref 13–44)
MCHC RBC-ENTMCNC: 28.7 PG — SIGNIFICANT CHANGE UP (ref 27–34)
MCHC RBC-ENTMCNC: 31.6 G/DL — LOW (ref 32–36)
MCV RBC AUTO: 91 FL — SIGNIFICANT CHANGE UP (ref 80–100)
MONOCYTES # BLD AUTO: 0.85 K/UL — SIGNIFICANT CHANGE UP (ref 0–0.9)
MONOCYTES NFR BLD AUTO: 11.9 % — SIGNIFICANT CHANGE UP (ref 2–14)
NEUTROPHILS # BLD AUTO: 4 K/UL — SIGNIFICANT CHANGE UP (ref 1.8–7.4)
NEUTROPHILS NFR BLD AUTO: 56 % — SIGNIFICANT CHANGE UP (ref 43–77)
NITRITE UR-MCNC: POSITIVE
NRBC BLD AUTO-RTO: 0 /100 WBCS — SIGNIFICANT CHANGE UP (ref 0–0)
PH UR: 6.5 — SIGNIFICANT CHANGE UP (ref 5–8)
PLATELET # BLD AUTO: 287 K/UL — SIGNIFICANT CHANGE UP (ref 150–400)
POTASSIUM SERPL-MCNC: 4.9 MMOL/L — SIGNIFICANT CHANGE UP (ref 3.5–5.3)
POTASSIUM SERPL-SCNC: 4.9 MMOL/L — SIGNIFICANT CHANGE UP (ref 3.5–5.3)
PROT SERPL-MCNC: 6.9 G/DL — SIGNIFICANT CHANGE UP (ref 6–8.3)
PROT UR-MCNC: 100 MG/DL
RBC # BLD: 3.34 M/UL — LOW (ref 4.2–5.8)
RBC # FLD: 14.6 % — HIGH (ref 10.3–14.5)
SODIUM SERPL-SCNC: 138 MMOL/L — SIGNIFICANT CHANGE UP (ref 135–145)
SP GR SPEC: 1.02 — SIGNIFICANT CHANGE UP (ref 1–1.03)
UROBILINOGEN FLD QL: 0.2 MG/DL — SIGNIFICANT CHANGE UP (ref 0.2–1)
WBC # BLD: 7.14 K/UL — SIGNIFICANT CHANGE UP (ref 3.8–10.5)
WBC # FLD AUTO: 7.14 K/UL — SIGNIFICANT CHANGE UP (ref 3.8–10.5)

## 2025-08-18 PROCEDURE — 36415 COLL VENOUS BLD VENIPUNCTURE: CPT

## 2025-08-18 PROCEDURE — 99223 1ST HOSP IP/OBS HIGH 75: CPT | Mod: GC

## 2025-08-18 PROCEDURE — 74177 CT ABD & PELVIS W/CONTRAST: CPT

## 2025-08-18 PROCEDURE — 99285 EMERGENCY DEPT VISIT HI MDM: CPT

## 2025-08-18 PROCEDURE — 83605 ASSAY OF LACTIC ACID: CPT

## 2025-08-18 PROCEDURE — 85025 COMPLETE CBC W/AUTO DIFF WBC: CPT

## 2025-08-18 PROCEDURE — 74177 CT ABD & PELVIS W/CONTRAST: CPT | Mod: 26

## 2025-08-18 PROCEDURE — 80053 COMPREHEN METABOLIC PANEL: CPT

## 2025-08-18 PROCEDURE — 87086 URINE CULTURE/COLONY COUNT: CPT

## 2025-08-18 PROCEDURE — 81001 URINALYSIS AUTO W/SCOPE: CPT

## 2025-08-18 PROCEDURE — 83690 ASSAY OF LIPASE: CPT

## 2025-08-18 RX ORDER — ONDANSETRON HCL/PF 4 MG/2 ML
4 VIAL (ML) INJECTION ONCE
Refills: 0 | Status: COMPLETED | OUTPATIENT
Start: 2025-08-18 | End: 2025-08-18

## 2025-08-18 RX ORDER — ACETAMINOPHEN 500 MG/5ML
650 LIQUID (ML) ORAL EVERY 6 HOURS
Refills: 0 | Status: DISCONTINUED | OUTPATIENT
Start: 2025-08-18 | End: 2025-08-19

## 2025-08-18 RX ORDER — CEFTRIAXONE 500 MG/1
1000 INJECTION, POWDER, FOR SOLUTION INTRAMUSCULAR; INTRAVENOUS ONCE
Refills: 0 | Status: COMPLETED | OUTPATIENT
Start: 2025-08-18 | End: 2025-08-18

## 2025-08-18 RX ORDER — ACETAMINOPHEN 500 MG/5ML
1000 LIQUID (ML) ORAL ONCE
Refills: 0 | Status: COMPLETED | OUTPATIENT
Start: 2025-08-18 | End: 2025-08-18

## 2025-08-18 RX ORDER — ONDANSETRON HCL/PF 4 MG/2 ML
4 VIAL (ML) INJECTION EVERY 8 HOURS
Refills: 0 | Status: DISCONTINUED | OUTPATIENT
Start: 2025-08-18 | End: 2025-08-19

## 2025-08-18 RX ORDER — SODIUM CHLORIDE 9 G/1000ML
1000 INJECTION, SOLUTION INTRAVENOUS ONCE
Refills: 0 | Status: COMPLETED | OUTPATIENT
Start: 2025-08-18 | End: 2025-08-18

## 2025-08-18 RX ADMIN — Medication 4 MILLIGRAM(S): at 18:45

## 2025-08-18 RX ADMIN — SODIUM CHLORIDE 1000 MILLILITER(S): 9 INJECTION, SOLUTION INTRAVENOUS at 23:28

## 2025-08-18 RX ADMIN — Medication 400 MILLIGRAM(S): at 23:26

## 2025-08-18 RX ADMIN — CEFTRIAXONE 100 MILLIGRAM(S): 500 INJECTION, POWDER, FOR SOLUTION INTRAMUSCULAR; INTRAVENOUS at 18:45

## 2025-08-18 RX ADMIN — Medication 1000 MILLILITER(S): at 21:12

## 2025-08-19 ENCOUNTER — INPATIENT (INPATIENT)
Facility: HOSPITAL | Age: 60
LOS: 9 days | Discharge: HOME HEALTH | End: 2025-08-29
Attending: INTERNAL MEDICINE | Admitting: INTERNAL MEDICINE
Payer: MEDICAID

## 2025-08-19 VITALS
WEIGHT: 189.38 LBS | DIASTOLIC BLOOD PRESSURE: 88 MMHG | OXYGEN SATURATION: 100 % | SYSTOLIC BLOOD PRESSURE: 129 MMHG | HEART RATE: 94 BPM | HEIGHT: 71 IN | TEMPERATURE: 100 F | RESPIRATION RATE: 18 BRPM

## 2025-08-19 VITALS
SYSTOLIC BLOOD PRESSURE: 110 MMHG | HEART RATE: 86 BPM | TEMPERATURE: 98 F | RESPIRATION RATE: 18 BRPM | DIASTOLIC BLOOD PRESSURE: 74 MMHG | OXYGEN SATURATION: 96 %

## 2025-08-19 DIAGNOSIS — F41.9 ANXIETY DISORDER, UNSPECIFIED: ICD-10-CM

## 2025-08-19 DIAGNOSIS — C21.0 MALIGNANT NEOPLASM OF ANUS, UNSPECIFIED: ICD-10-CM

## 2025-08-19 DIAGNOSIS — I10 ESSENTIAL (PRIMARY) HYPERTENSION: ICD-10-CM

## 2025-08-19 DIAGNOSIS — R31.9 HEMATURIA, UNSPECIFIED: ICD-10-CM

## 2025-08-19 DIAGNOSIS — Z29.9 ENCOUNTER FOR PROPHYLACTIC MEASURES, UNSPECIFIED: ICD-10-CM

## 2025-08-19 DIAGNOSIS — E78.5 HYPERLIPIDEMIA, UNSPECIFIED: ICD-10-CM

## 2025-08-19 DIAGNOSIS — Z93.3 COLOSTOMY STATUS: Chronic | ICD-10-CM

## 2025-08-19 DIAGNOSIS — Z85.46 PERSONAL HISTORY OF MALIGNANT NEOPLASM OF PROSTATE: ICD-10-CM

## 2025-08-19 DIAGNOSIS — C61 MALIGNANT NEOPLASM OF PROSTATE: ICD-10-CM

## 2025-08-19 DIAGNOSIS — N39.0 URINARY TRACT INFECTION, SITE NOT SPECIFIED: ICD-10-CM

## 2025-08-19 DIAGNOSIS — N41.2 ABSCESS OF PROSTATE: ICD-10-CM

## 2025-08-19 DIAGNOSIS — B20 HUMAN IMMUNODEFICIENCY VIRUS [HIV] DISEASE: ICD-10-CM

## 2025-08-19 DIAGNOSIS — Z85.048 PERSONAL HISTORY OF OTHER MALIGNANT NEOPLASM OF RECTUM, RECTOSIGMOID JUNCTION, AND ANUS: ICD-10-CM

## 2025-08-19 DIAGNOSIS — I72.3 ANEURYSM OF ILIAC ARTERY: ICD-10-CM

## 2025-08-19 DIAGNOSIS — R31.0 GROSS HEMATURIA: ICD-10-CM

## 2025-08-19 DIAGNOSIS — Z93.6 OTHER ARTIFICIAL OPENINGS OF URINARY TRACT STATUS: Chronic | ICD-10-CM

## 2025-08-19 DIAGNOSIS — D64.9 ANEMIA, UNSPECIFIED: ICD-10-CM

## 2025-08-19 DIAGNOSIS — D62 ACUTE POSTHEMORRHAGIC ANEMIA: ICD-10-CM

## 2025-08-19 LAB
4/8 RATIO: 0.43 RATIO — LOW (ref 0.9–3.6)
ABS CD8: 1175 CELLS/UL — HIGH (ref 142–740)
ALBUMIN SERPL ELPH-MCNC: 2.8 G/DL — LOW (ref 3.5–5)
ALBUMIN SERPL ELPH-MCNC: 3.5 G/DL — SIGNIFICANT CHANGE UP (ref 3.3–5)
ALP SERPL-CCNC: 68 U/L — SIGNIFICANT CHANGE UP (ref 40–120)
ALP SERPL-CCNC: 79 U/L — SIGNIFICANT CHANGE UP (ref 40–120)
ALT FLD-CCNC: 13 U/L DA — SIGNIFICANT CHANGE UP (ref 10–60)
ALT FLD-CCNC: 7 U/L — SIGNIFICANT CHANGE UP (ref 4–41)
ANION GAP SERPL CALC-SCNC: 1 MMOL/L — LOW (ref 5–17)
ANION GAP SERPL CALC-SCNC: 11 MMOL/L — SIGNIFICANT CHANGE UP (ref 7–14)
APTT BLD: 28.1 SEC — SIGNIFICANT CHANGE UP (ref 26.1–36.8)
AST SERPL-CCNC: 11 U/L — SIGNIFICANT CHANGE UP (ref 10–40)
AST SERPL-CCNC: 12 U/L — SIGNIFICANT CHANGE UP (ref 4–40)
BASOPHILS # BLD AUTO: 0.04 K/UL — SIGNIFICANT CHANGE UP (ref 0–0.2)
BASOPHILS NFR BLD AUTO: 0.4 % — SIGNIFICANT CHANGE UP (ref 0–2)
BILIRUB SERPL-MCNC: 0.3 MG/DL — SIGNIFICANT CHANGE UP (ref 0.2–1.2)
BILIRUB SERPL-MCNC: <0.2 MG/DL — SIGNIFICANT CHANGE UP (ref 0.2–1.2)
BLD GP AB SCN SERPL QL: NEGATIVE — SIGNIFICANT CHANGE UP
BLD GP AB SCN SERPL QL: SIGNIFICANT CHANGE UP
BUN SERPL-MCNC: 13 MG/DL — SIGNIFICANT CHANGE UP (ref 7–23)
BUN SERPL-MCNC: 15 MG/DL — SIGNIFICANT CHANGE UP (ref 7–18)
CALCIUM SERPL-MCNC: 8.5 MG/DL — SIGNIFICANT CHANGE UP (ref 8.4–10.5)
CALCIUM SERPL-MCNC: 8.6 MG/DL — SIGNIFICANT CHANGE UP (ref 8.4–10.5)
CD3 BLASTS SPEC-ACNC: 1658 CELLS/UL — SIGNIFICANT CHANGE UP (ref 672–1870)
CD3 BLASTS SPEC-ACNC: 82 % — SIGNIFICANT CHANGE UP (ref 59–83)
CD4 %: 25 % — LOW (ref 30–62)
CD8 %: 58 % — HIGH (ref 12–36)
CHLORIDE SERPL-SCNC: 104 MMOL/L — SIGNIFICANT CHANGE UP (ref 98–107)
CHLORIDE SERPL-SCNC: 107 MMOL/L — SIGNIFICANT CHANGE UP (ref 96–108)
CO2 SERPL-SCNC: 23 MMOL/L — SIGNIFICANT CHANGE UP (ref 22–31)
CO2 SERPL-SCNC: 31 MMOL/L — SIGNIFICANT CHANGE UP (ref 22–31)
CREAT SERPL-MCNC: 0.86 MG/DL — SIGNIFICANT CHANGE UP (ref 0.5–1.3)
CREAT SERPL-MCNC: 0.93 MG/DL — SIGNIFICANT CHANGE UP (ref 0.5–1.3)
CRP SERPL-MCNC: 11.6 MG/L — HIGH (ref 0–5)
EGFR: 100 ML/MIN/1.73M2 — SIGNIFICANT CHANGE UP
EGFR: 100 ML/MIN/1.73M2 — SIGNIFICANT CHANGE UP
EGFR: 95 ML/MIN/1.73M2 — SIGNIFICANT CHANGE UP
EGFR: 95 ML/MIN/1.73M2 — SIGNIFICANT CHANGE UP
EOSINOPHIL # BLD AUTO: 0.06 K/UL — SIGNIFICANT CHANGE UP (ref 0–0.5)
EOSINOPHIL NFR BLD AUTO: 0.6 % — SIGNIFICANT CHANGE UP (ref 0–6)
ERYTHROCYTE [SEDIMENTATION RATE] IN BLOOD: 20 MM/HR — HIGH (ref 0–15)
GAS PNL BLDV: SIGNIFICANT CHANGE UP
GLUCOSE SERPL-MCNC: 106 MG/DL — HIGH (ref 70–99)
GLUCOSE SERPL-MCNC: 98 MG/DL — SIGNIFICANT CHANGE UP (ref 70–99)
HCT VFR BLD CALC: 26.4 % — LOW (ref 39–50)
HCT VFR BLD CALC: 26.5 % — LOW (ref 39–50)
HCT VFR BLD CALC: 26.9 % — LOW (ref 39–50)
HGB BLD-MCNC: 8.3 G/DL — LOW (ref 13–17)
HGB BLD-MCNC: 8.4 G/DL — LOW (ref 13–17)
HGB BLD-MCNC: 8.5 G/DL — LOW (ref 13–17)
HOROWITZ INDEX BLDV+IHG-RTO: SIGNIFICANT CHANGE UP
IMM GRANULOCYTES # BLD AUTO: 0.04 K/UL — SIGNIFICANT CHANGE UP (ref 0–0.07)
IMM GRANULOCYTES NFR BLD AUTO: 0.4 % — SIGNIFICANT CHANGE UP (ref 0–0.9)
INR BLD: 1.13 RATIO — SIGNIFICANT CHANGE UP (ref 0.85–1.16)
LACTATE SERPL-SCNC: 1.1 MMOL/L — SIGNIFICANT CHANGE UP (ref 0.7–2)
LYMPHOCYTES # BLD AUTO: 1.93 K/UL — SIGNIFICANT CHANGE UP (ref 1–3.3)
LYMPHOCYTES NFR BLD AUTO: 20.4 % — SIGNIFICANT CHANGE UP (ref 13–44)
MAGNESIUM SERPL-MCNC: 1.8 MG/DL — SIGNIFICANT CHANGE UP (ref 1.6–2.6)
MAGNESIUM SERPL-MCNC: 2 MG/DL — SIGNIFICANT CHANGE UP (ref 1.6–2.6)
MCHC RBC-ENTMCNC: 27.8 PG — SIGNIFICANT CHANGE UP (ref 27–34)
MCHC RBC-ENTMCNC: 27.9 PG — SIGNIFICANT CHANGE UP (ref 27–34)
MCHC RBC-ENTMCNC: 28.1 PG — SIGNIFICANT CHANGE UP (ref 27–34)
MCHC RBC-ENTMCNC: 31.4 G/DL — LOW (ref 32–36)
MCHC RBC-ENTMCNC: 31.6 G/DL — LOW (ref 32–36)
MCHC RBC-ENTMCNC: 31.7 G/DL — LOW (ref 32–36)
MCV RBC AUTO: 88.2 FL — SIGNIFICANT CHANGE UP (ref 80–100)
MCV RBC AUTO: 88.3 FL — SIGNIFICANT CHANGE UP (ref 80–100)
MCV RBC AUTO: 88.6 FL — SIGNIFICANT CHANGE UP (ref 80–100)
MONOCYTES # BLD AUTO: 1.03 K/UL — HIGH (ref 0–0.9)
MONOCYTES NFR BLD AUTO: 10.9 % — SIGNIFICANT CHANGE UP (ref 2–14)
NEUTROPHILS # BLD AUTO: 6.37 K/UL — SIGNIFICANT CHANGE UP (ref 1.8–7.4)
NEUTROPHILS NFR BLD AUTO: 67.3 % — SIGNIFICANT CHANGE UP (ref 43–77)
NRBC # BLD AUTO: 0 K/UL — SIGNIFICANT CHANGE UP (ref 0–0)
NRBC # FLD: 0 K/UL — SIGNIFICANT CHANGE UP (ref 0–0)
NRBC BLD AUTO-RTO: 0 /100 WBCS — SIGNIFICANT CHANGE UP (ref 0–0)
PHOSPHATE SERPL-MCNC: 2.7 MG/DL — SIGNIFICANT CHANGE UP (ref 2.5–4.5)
PHOSPHATE SERPL-MCNC: 3.3 MG/DL — SIGNIFICANT CHANGE UP (ref 2.5–4.5)
PLATELET # BLD AUTO: 242 K/UL — SIGNIFICANT CHANGE UP (ref 150–400)
PLATELET # BLD AUTO: 244 K/UL — SIGNIFICANT CHANGE UP (ref 150–400)
PLATELET # BLD AUTO: 261 K/UL — SIGNIFICANT CHANGE UP (ref 150–400)
PMV BLD: 9.4 FL — SIGNIFICANT CHANGE UP (ref 7–13)
POTASSIUM SERPL-MCNC: 3.7 MMOL/L — SIGNIFICANT CHANGE UP (ref 3.5–5.3)
POTASSIUM SERPL-MCNC: 3.8 MMOL/L — SIGNIFICANT CHANGE UP (ref 3.5–5.3)
POTASSIUM SERPL-SCNC: 3.7 MMOL/L — SIGNIFICANT CHANGE UP (ref 3.5–5.3)
POTASSIUM SERPL-SCNC: 3.8 MMOL/L — SIGNIFICANT CHANGE UP (ref 3.5–5.3)
PROT SERPL-MCNC: 6.1 G/DL — SIGNIFICANT CHANGE UP (ref 6–8.3)
PROT SERPL-MCNC: 6.5 G/DL — SIGNIFICANT CHANGE UP (ref 6–8.3)
PROTHROM AB SERPL-ACNC: 13.1 SEC — SIGNIFICANT CHANGE UP (ref 9.9–13.4)
RBC # BLD: 2.99 M/UL — LOW (ref 4.2–5.8)
RBC # BLD: 2.99 M/UL — LOW (ref 4.2–5.8)
RBC # BLD: 3.05 M/UL — LOW (ref 4.2–5.8)
RBC # FLD: 14.6 % — HIGH (ref 10.3–14.5)
RBC # FLD: 14.6 % — HIGH (ref 10.3–14.5)
RBC # FLD: 14.8 % — HIGH (ref 10.3–14.5)
RH IG SCN BLD-IMP: NEGATIVE — SIGNIFICANT CHANGE UP
SODIUM SERPL-SCNC: 138 MMOL/L — SIGNIFICANT CHANGE UP (ref 135–145)
SODIUM SERPL-SCNC: 139 MMOL/L — SIGNIFICANT CHANGE UP (ref 135–145)
T-CELL CD4 SUBSET PNL BLD: 507 CELLS/UL — SIGNIFICANT CHANGE UP (ref 489–1457)
VIABLE CELLS NFR SPEC: SIGNIFICANT CHANGE UP
WBC # BLD: 6.21 K/UL — SIGNIFICANT CHANGE UP (ref 3.8–10.5)
WBC # BLD: 6.51 K/UL — SIGNIFICANT CHANGE UP (ref 3.8–10.5)
WBC # BLD: 9.47 K/UL — SIGNIFICANT CHANGE UP (ref 3.8–10.5)
WBC # FLD AUTO: 6.21 K/UL — SIGNIFICANT CHANGE UP (ref 3.8–10.5)
WBC # FLD AUTO: 6.51 K/UL — SIGNIFICANT CHANGE UP (ref 3.8–10.5)
WBC # FLD AUTO: 9.47 K/UL — SIGNIFICANT CHANGE UP (ref 3.8–10.5)

## 2025-08-19 PROCEDURE — 86901 BLOOD TYPING SEROLOGIC RH(D): CPT

## 2025-08-19 PROCEDURE — 87186 SC STD MICRODIL/AGAR DIL: CPT

## 2025-08-19 PROCEDURE — 86360 T CELL ABSOLUTE COUNT/RATIO: CPT

## 2025-08-19 PROCEDURE — 86900 BLOOD TYPING SEROLOGIC ABO: CPT

## 2025-08-19 PROCEDURE — 99239 HOSP IP/OBS DSCHRG MGMT >30: CPT | Mod: GC

## 2025-08-19 PROCEDURE — 74177 CT ABD & PELVIS W/CONTRAST: CPT

## 2025-08-19 PROCEDURE — 86140 C-REACTIVE PROTEIN: CPT

## 2025-08-19 PROCEDURE — 93010 ELECTROCARDIOGRAM REPORT: CPT

## 2025-08-19 PROCEDURE — 99285 EMERGENCY DEPT VISIT HI MDM: CPT

## 2025-08-19 PROCEDURE — 96375 TX/PRO/DX INJ NEW DRUG ADDON: CPT

## 2025-08-19 PROCEDURE — 83735 ASSAY OF MAGNESIUM: CPT

## 2025-08-19 PROCEDURE — 87086 URINE CULTURE/COLONY COUNT: CPT

## 2025-08-19 PROCEDURE — 83690 ASSAY OF LIPASE: CPT

## 2025-08-19 PROCEDURE — 85652 RBC SED RATE AUTOMATED: CPT

## 2025-08-19 PROCEDURE — 80053 COMPREHEN METABOLIC PANEL: CPT

## 2025-08-19 PROCEDURE — 81001 URINALYSIS AUTO W/SCOPE: CPT

## 2025-08-19 PROCEDURE — 86359 T CELLS TOTAL COUNT: CPT

## 2025-08-19 PROCEDURE — 36415 COLL VENOUS BLD VENIPUNCTURE: CPT

## 2025-08-19 PROCEDURE — 83605 ASSAY OF LACTIC ACID: CPT

## 2025-08-19 PROCEDURE — 96374 THER/PROPH/DIAG INJ IV PUSH: CPT

## 2025-08-19 PROCEDURE — 87077 CULTURE AEROBIC IDENTIFY: CPT

## 2025-08-19 PROCEDURE — 86850 RBC ANTIBODY SCREEN: CPT

## 2025-08-19 PROCEDURE — 99223 1ST HOSP IP/OBS HIGH 75: CPT

## 2025-08-19 PROCEDURE — 84100 ASSAY OF PHOSPHORUS: CPT

## 2025-08-19 PROCEDURE — 85025 COMPLETE CBC W/AUTO DIFF WBC: CPT

## 2025-08-19 PROCEDURE — 85027 COMPLETE CBC AUTOMATED: CPT

## 2025-08-19 RX ORDER — HYDROMORPHONE/SOD CHLOR,ISO/PF 2 MG/10 ML
1 SYRINGE (ML) INJECTION ONCE
Refills: 0 | Status: DISCONTINUED | OUTPATIENT
Start: 2025-08-19 | End: 2025-08-19

## 2025-08-19 RX ORDER — PIPERACILLIN-TAZO-DEXTROSE,ISO 3.375G/5
3.38 IV SOLUTION, PIGGYBACK PREMIX FROZEN(ML) INTRAVENOUS ONCE
Refills: 0 | Status: COMPLETED | OUTPATIENT
Start: 2025-08-19 | End: 2025-08-19

## 2025-08-19 RX ORDER — TAMSULOSIN HYDROCHLORIDE 0.4 MG/1
0.4 CAPSULE ORAL AT BEDTIME
Refills: 0 | Status: DISCONTINUED | OUTPATIENT
Start: 2025-08-19 | End: 2025-08-29

## 2025-08-19 RX ORDER — HYDROMORPHONE/SOD CHLOR,ISO/PF 2 MG/10 ML
2 SYRINGE (ML) INJECTION EVERY 6 HOURS
Refills: 0 | Status: DISCONTINUED | OUTPATIENT
Start: 2025-08-19 | End: 2025-08-19

## 2025-08-19 RX ORDER — MIRTAZAPINE 30 MG/1
7.5 TABLET, FILM COATED ORAL AT BEDTIME
Refills: 0 | Status: DISCONTINUED | OUTPATIENT
Start: 2025-08-19 | End: 2025-08-19

## 2025-08-19 RX ORDER — EZETIMIBE 10 MG/1
10 TABLET ORAL AT BEDTIME
Refills: 0 | Status: DISCONTINUED | OUTPATIENT
Start: 2025-08-19 | End: 2025-08-19

## 2025-08-19 RX ORDER — DILTIAZEM HYDROCHLORIDE 240 MG/1
240 TABLET, EXTENDED RELEASE ORAL DAILY
Refills: 0 | Status: DISCONTINUED | OUTPATIENT
Start: 2025-08-19 | End: 2025-08-19

## 2025-08-19 RX ORDER — BUPROPION HYDROBROMIDE 522 MG/1
1 TABLET, EXTENDED RELEASE ORAL
Refills: 0 | DISCHARGE

## 2025-08-19 RX ORDER — CLONAZEPAM 0.5 MG/1
1 TABLET ORAL
Refills: 0 | Status: DISCONTINUED | OUTPATIENT
Start: 2025-08-19 | End: 2025-08-19

## 2025-08-19 RX ORDER — ACETAMINOPHEN 500 MG/5ML
1000 LIQUID (ML) ORAL ONCE
Refills: 0 | Status: COMPLETED | OUTPATIENT
Start: 2025-08-19 | End: 2025-08-19

## 2025-08-19 RX ORDER — POLYETHYLENE GLYCOL 3350 17 G/17G
17 POWDER, FOR SOLUTION ORAL
Refills: 0 | Status: DISCONTINUED | OUTPATIENT
Start: 2025-08-19 | End: 2025-08-19

## 2025-08-19 RX ORDER — HYDROMORPHONE/SOD CHLOR,ISO/PF 2 MG/10 ML
4 SYRINGE (ML) INJECTION
Refills: 0 | Status: DISCONTINUED | OUTPATIENT
Start: 2025-08-19 | End: 2025-08-20

## 2025-08-19 RX ORDER — BICTEGRAVIR SODIUM, EMTRICITABINE, AND TENOFOVIR ALAFENAMIDE FUMARATE 50; 200; 25 MG/1; MG/1; MG/1
1 TABLET ORAL DAILY
Refills: 0 | Status: DISCONTINUED | OUTPATIENT
Start: 2025-08-19 | End: 2025-08-19

## 2025-08-19 RX ORDER — PIPERACILLIN-TAZO-DEXTROSE,ISO 3.375G/5
3.38 IV SOLUTION, PIGGYBACK PREMIX FROZEN(ML) INTRAVENOUS EVERY 8 HOURS
Refills: 0 | Status: DISCONTINUED | OUTPATIENT
Start: 2025-08-19 | End: 2025-08-19

## 2025-08-19 RX ORDER — BUPROPION HYDROBROMIDE 522 MG/1
300 TABLET, EXTENDED RELEASE ORAL DAILY
Refills: 0 | Status: DISCONTINUED | OUTPATIENT
Start: 2025-08-19 | End: 2025-08-29

## 2025-08-19 RX ORDER — HYDROMORPHONE/SOD CHLOR,ISO/PF 2 MG/10 ML
4 SYRINGE (ML) INJECTION EVERY 4 HOURS
Refills: 0 | Status: DISCONTINUED | OUTPATIENT
Start: 2025-08-19 | End: 2025-08-19

## 2025-08-19 RX ORDER — TAMSULOSIN HYDROCHLORIDE 0.4 MG/1
0.4 CAPSULE ORAL AT BEDTIME
Refills: 0 | Status: DISCONTINUED | OUTPATIENT
Start: 2025-08-19 | End: 2025-08-19

## 2025-08-19 RX ORDER — MIRABEGRON 50 MG/1
50 TABLET, FILM COATED, EXTENDED RELEASE ORAL DAILY
Refills: 0 | Status: DISCONTINUED | OUTPATIENT
Start: 2025-08-19 | End: 2025-08-29

## 2025-08-19 RX ORDER — MIRTAZAPINE 30 MG/1
1 TABLET, FILM COATED ORAL
Refills: 0 | DISCHARGE

## 2025-08-19 RX ORDER — PIPERACILLIN-TAZO-DEXTROSE,ISO 3.375G/5
3.38 IV SOLUTION, PIGGYBACK PREMIX FROZEN(ML) INTRAVENOUS ONCE
Refills: 0 | Status: COMPLETED | OUTPATIENT
Start: 2025-08-20 | End: 2025-08-20

## 2025-08-19 RX ORDER — TRAZODONE HCL 100 MG
50 TABLET ORAL AT BEDTIME
Refills: 0 | Status: DISCONTINUED | OUTPATIENT
Start: 2025-08-19 | End: 2025-08-19

## 2025-08-19 RX ORDER — METOPROLOL SUCCINATE 50 MG/1
100 TABLET, EXTENDED RELEASE ORAL DAILY
Refills: 0 | Status: DISCONTINUED | OUTPATIENT
Start: 2025-08-19 | End: 2025-08-19

## 2025-08-19 RX ORDER — EZETIMIBE 10 MG/1
10 TABLET ORAL AT BEDTIME
Refills: 0 | Status: DISCONTINUED | OUTPATIENT
Start: 2025-08-19 | End: 2025-08-29

## 2025-08-19 RX ORDER — SODIUM CHLORIDE 9 G/1000ML
1000 INJECTION, SOLUTION INTRAVENOUS
Refills: 0 | Status: DISCONTINUED | OUTPATIENT
Start: 2025-08-19 | End: 2025-08-19

## 2025-08-19 RX ORDER — PIPERACILLIN-TAZO-DEXTROSE,ISO 3.375G/5
3.38 IV SOLUTION, PIGGYBACK PREMIX FROZEN(ML) INTRAVENOUS EVERY 8 HOURS
Refills: 0 | Status: DISCONTINUED | OUTPATIENT
Start: 2025-08-20 | End: 2025-08-29

## 2025-08-19 RX ORDER — SENNA 187 MG
2 TABLET ORAL AT BEDTIME
Refills: 0 | Status: DISCONTINUED | OUTPATIENT
Start: 2025-08-19 | End: 2025-08-19

## 2025-08-19 RX ORDER — TRAZODONE HCL 100 MG
50 TABLET ORAL
Refills: 0 | DISCHARGE

## 2025-08-19 RX ORDER — BICTEGRAVIR SODIUM, EMTRICITABINE, AND TENOFOVIR ALAFENAMIDE FUMARATE 50; 200; 25 MG/1; MG/1; MG/1
1 TABLET ORAL DAILY
Refills: 0 | Status: DISCONTINUED | OUTPATIENT
Start: 2025-08-19 | End: 2025-08-29

## 2025-08-19 RX ORDER — TRAZODONE HCL 100 MG
50 TABLET ORAL AT BEDTIME
Refills: 0 | Status: DISCONTINUED | OUTPATIENT
Start: 2025-08-19 | End: 2025-08-29

## 2025-08-19 RX ORDER — HYDROMORPHONE/SOD CHLOR,ISO/PF 2 MG/10 ML
1 SYRINGE (ML) INJECTION
Refills: 0 | DISCHARGE

## 2025-08-19 RX ORDER — CLONAZEPAM 0.5 MG/1
1 TABLET ORAL
Refills: 0 | DISCHARGE

## 2025-08-19 RX ORDER — MIRABEGRON 50 MG/1
1 TABLET, FILM COATED, EXTENDED RELEASE ORAL
Refills: 0 | DISCHARGE

## 2025-08-19 RX ORDER — POLYETHYLENE GLYCOL 3350 17 G/17G
17 POWDER, FOR SOLUTION ORAL DAILY
Refills: 0 | Status: DISCONTINUED | OUTPATIENT
Start: 2025-08-19 | End: 2025-08-19

## 2025-08-19 RX ORDER — SENNA 187 MG
2 TABLET ORAL AT BEDTIME
Refills: 0 | Status: DISCONTINUED | OUTPATIENT
Start: 2025-08-19 | End: 2025-08-29

## 2025-08-19 RX ORDER — ACETAMINOPHEN 500 MG/5ML
650 LIQUID (ML) ORAL EVERY 6 HOURS
Refills: 0 | Status: DISCONTINUED | OUTPATIENT
Start: 2025-08-19 | End: 2025-08-29

## 2025-08-19 RX ORDER — MELATONIN 5 MG
3 TABLET ORAL AT BEDTIME
Refills: 0 | Status: DISCONTINUED | OUTPATIENT
Start: 2025-08-19 | End: 2025-08-29

## 2025-08-19 RX ORDER — BUPROPION HYDROBROMIDE 522 MG/1
300 TABLET, EXTENDED RELEASE ORAL DAILY
Refills: 0 | Status: DISCONTINUED | OUTPATIENT
Start: 2025-08-19 | End: 2025-08-19

## 2025-08-19 RX ORDER — CLONAZEPAM 0.5 MG/1
1 TABLET ORAL
Refills: 0 | Status: DISCONTINUED | OUTPATIENT
Start: 2025-08-19 | End: 2025-08-21

## 2025-08-19 RX ORDER — MAGNESIUM, ALUMINUM HYDROXIDE 200-200 MG
30 TABLET,CHEWABLE ORAL EVERY 4 HOURS
Refills: 0 | Status: DISCONTINUED | OUTPATIENT
Start: 2025-08-19 | End: 2025-08-20

## 2025-08-19 RX ORDER — PIPERACILLIN-TAZO-DEXTROSE,ISO 3.375G/5
3.38 IV SOLUTION, PIGGYBACK PREMIX FROZEN(ML) INTRAVENOUS ONCE
Refills: 0 | Status: DISCONTINUED | OUTPATIENT
Start: 2025-08-19 | End: 2025-08-19

## 2025-08-19 RX ORDER — POLYETHYLENE GLYCOL 3350 17 G/17G
17 POWDER, FOR SOLUTION ORAL DAILY
Refills: 0 | Status: DISCONTINUED | OUTPATIENT
Start: 2025-08-19 | End: 2025-08-29

## 2025-08-19 RX ORDER — LACTULOSE 10 G/15ML
10 SOLUTION ORAL
Refills: 0 | Status: DISCONTINUED | OUTPATIENT
Start: 2025-08-19 | End: 2025-08-19

## 2025-08-19 RX ORDER — TAMSULOSIN HYDROCHLORIDE 0.4 MG/1
1 CAPSULE ORAL
Refills: 0 | DISCHARGE

## 2025-08-19 RX ORDER — DILTIAZEM HYDROCHLORIDE 240 MG/1
1 TABLET, EXTENDED RELEASE ORAL
Refills: 0 | DISCHARGE

## 2025-08-19 RX ORDER — BISACODYL 5 MG
10 TABLET, DELAYED RELEASE (ENTERIC COATED) ORAL DAILY
Refills: 0 | Status: DISCONTINUED | OUTPATIENT
Start: 2025-08-19 | End: 2025-08-29

## 2025-08-19 RX ORDER — MIRTAZAPINE 30 MG/1
7.5 TABLET, FILM COATED ORAL AT BEDTIME
Refills: 0 | Status: DISCONTINUED | OUTPATIENT
Start: 2025-08-19 | End: 2025-08-29

## 2025-08-19 RX ORDER — METOPROLOL SUCCINATE 50 MG/1
1 TABLET, EXTENDED RELEASE ORAL
Refills: 0 | DISCHARGE

## 2025-08-19 RX ADMIN — TAMSULOSIN HYDROCHLORIDE 0.4 MILLIGRAM(S): 0.4 CAPSULE ORAL at 01:39

## 2025-08-19 RX ADMIN — Medication 4 MILLIGRAM(S): at 21:56

## 2025-08-19 RX ADMIN — Medication 4 MILLIGRAM(S): at 01:41

## 2025-08-19 RX ADMIN — Medication 4 MILLIGRAM(S): at 02:36

## 2025-08-19 RX ADMIN — Medication 1 MILLIGRAM(S): at 13:15

## 2025-08-19 RX ADMIN — Medication 2 TABLET(S): at 21:56

## 2025-08-19 RX ADMIN — Medication 4 MILLIGRAM(S): at 22:56

## 2025-08-19 RX ADMIN — Medication 50 MILLIGRAM(S): at 21:56

## 2025-08-19 RX ADMIN — MIRTAZAPINE 7.5 MILLIGRAM(S): 30 TABLET, FILM COATED ORAL at 21:56

## 2025-08-19 RX ADMIN — Medication 25 GRAM(S): at 13:37

## 2025-08-19 RX ADMIN — TAMSULOSIN HYDROCHLORIDE 0.4 MILLIGRAM(S): 0.4 CAPSULE ORAL at 21:57

## 2025-08-19 RX ADMIN — Medication 10 MILLIGRAM(S): at 01:39

## 2025-08-19 RX ADMIN — Medication 200 GRAM(S): at 23:47

## 2025-08-19 RX ADMIN — EZETIMIBE 10 MILLIGRAM(S): 10 TABLET ORAL at 01:39

## 2025-08-19 RX ADMIN — CLONAZEPAM 1 MILLIGRAM(S): 0.5 TABLET ORAL at 01:55

## 2025-08-19 RX ADMIN — Medication 4 MILLIGRAM(S): at 10:50

## 2025-08-19 RX ADMIN — Medication 25 GRAM(S): at 06:08

## 2025-08-19 RX ADMIN — EZETIMIBE 10 MILLIGRAM(S): 10 TABLET ORAL at 21:56

## 2025-08-19 RX ADMIN — SODIUM CHLORIDE 70 MILLILITER(S): 9 INJECTION, SOLUTION INTRAVENOUS at 01:22

## 2025-08-19 RX ADMIN — Medication 200 GRAM(S): at 01:22

## 2025-08-19 RX ADMIN — Medication 4 MILLIGRAM(S): at 09:48

## 2025-08-19 RX ADMIN — BICTEGRAVIR SODIUM, EMTRICITABINE, AND TENOFOVIR ALAFENAMIDE FUMARATE 1 TABLET(S): 50; 200; 25 TABLET ORAL at 12:11

## 2025-08-19 RX ADMIN — Medication 1 MILLIGRAM(S): at 12:11

## 2025-08-19 RX ADMIN — BUPROPION HYDROBROMIDE 300 MILLIGRAM(S): 522 TABLET, EXTENDED RELEASE ORAL at 12:12

## 2025-08-19 RX ADMIN — Medication 400 MILLIGRAM(S): at 23:23

## 2025-08-19 RX ADMIN — Medication 50 MILLIGRAM(S): at 01:39

## 2025-08-20 DIAGNOSIS — N39.0 URINARY TRACT INFECTION, SITE NOT SPECIFIED: ICD-10-CM

## 2025-08-20 LAB
ALBUMIN SERPL ELPH-MCNC: 3.5 G/DL — SIGNIFICANT CHANGE UP (ref 3.3–5)
ALP SERPL-CCNC: 77 U/L — SIGNIFICANT CHANGE UP (ref 40–120)
ALT FLD-CCNC: <5 U/L — SIGNIFICANT CHANGE UP (ref 4–41)
ANION GAP SERPL CALC-SCNC: 11 MMOL/L — SIGNIFICANT CHANGE UP (ref 7–14)
APTT BLD: 28.5 SEC — SIGNIFICANT CHANGE UP (ref 26.1–36.8)
AST SERPL-CCNC: 11 U/L — SIGNIFICANT CHANGE UP (ref 4–40)
BASOPHILS # BLD AUTO: 0.05 K/UL — SIGNIFICANT CHANGE UP (ref 0–0.2)
BASOPHILS NFR BLD AUTO: 0.7 % — SIGNIFICANT CHANGE UP (ref 0–2)
BILIRUB SERPL-MCNC: 0.3 MG/DL — SIGNIFICANT CHANGE UP (ref 0.2–1.2)
BUN SERPL-MCNC: 10 MG/DL — SIGNIFICANT CHANGE UP (ref 7–23)
CALCIUM SERPL-MCNC: 8.7 MG/DL — SIGNIFICANT CHANGE UP (ref 8.4–10.5)
CHLORIDE SERPL-SCNC: 102 MMOL/L — SIGNIFICANT CHANGE UP (ref 98–107)
CO2 SERPL-SCNC: 25 MMOL/L — SIGNIFICANT CHANGE UP (ref 22–31)
CREAT SERPL-MCNC: 0.89 MG/DL — SIGNIFICANT CHANGE UP (ref 0.5–1.3)
EGFR: 99 ML/MIN/1.73M2 — SIGNIFICANT CHANGE UP
EGFR: 99 ML/MIN/1.73M2 — SIGNIFICANT CHANGE UP
EOSINOPHIL # BLD AUTO: 0.04 K/UL — SIGNIFICANT CHANGE UP (ref 0–0.5)
EOSINOPHIL NFR BLD AUTO: 0.6 % — SIGNIFICANT CHANGE UP (ref 0–6)
GLUCOSE SERPL-MCNC: 94 MG/DL — SIGNIFICANT CHANGE UP (ref 70–99)
HCT VFR BLD CALC: 27 % — LOW (ref 39–50)
HGB BLD-MCNC: 8.5 G/DL — LOW (ref 13–17)
IMM GRANULOCYTES # BLD AUTO: 0.02 K/UL — SIGNIFICANT CHANGE UP (ref 0–0.07)
IMM GRANULOCYTES NFR BLD AUTO: 0.3 % — SIGNIFICANT CHANGE UP (ref 0–0.9)
INR BLD: 1.18 RATIO — HIGH (ref 0.85–1.16)
LYMPHOCYTES # BLD AUTO: 1.79 K/UL — SIGNIFICANT CHANGE UP (ref 1–3.3)
LYMPHOCYTES NFR BLD AUTO: 25.1 % — SIGNIFICANT CHANGE UP (ref 13–44)
MAGNESIUM SERPL-MCNC: 2 MG/DL — SIGNIFICANT CHANGE UP (ref 1.6–2.6)
MCHC RBC-ENTMCNC: 27.9 PG — SIGNIFICANT CHANGE UP (ref 27–34)
MCHC RBC-ENTMCNC: 31.5 G/DL — LOW (ref 32–36)
MCV RBC AUTO: 88.5 FL — SIGNIFICANT CHANGE UP (ref 80–100)
MONOCYTES # BLD AUTO: 1.05 K/UL — HIGH (ref 0–0.9)
MONOCYTES NFR BLD AUTO: 14.7 % — HIGH (ref 2–14)
MRSA PCR RESULT.: SIGNIFICANT CHANGE UP
NEUTROPHILS # BLD AUTO: 4.19 K/UL — SIGNIFICANT CHANGE UP (ref 1.8–7.4)
NEUTROPHILS NFR BLD AUTO: 58.6 % — SIGNIFICANT CHANGE UP (ref 43–77)
NRBC # BLD AUTO: 0 K/UL — SIGNIFICANT CHANGE UP (ref 0–0)
NRBC # FLD: 0 K/UL — SIGNIFICANT CHANGE UP (ref 0–0)
NRBC BLD AUTO-RTO: 0 /100 WBCS — SIGNIFICANT CHANGE UP (ref 0–0)
PHOSPHATE SERPL-MCNC: 3.3 MG/DL — SIGNIFICANT CHANGE UP (ref 2.5–4.5)
PLATELET # BLD AUTO: 263 K/UL — SIGNIFICANT CHANGE UP (ref 150–400)
PMV BLD: 9.9 FL — SIGNIFICANT CHANGE UP (ref 7–13)
POTASSIUM SERPL-MCNC: 3.4 MMOL/L — LOW (ref 3.5–5.3)
POTASSIUM SERPL-SCNC: 3.4 MMOL/L — LOW (ref 3.5–5.3)
PROT SERPL-MCNC: 6.5 G/DL — SIGNIFICANT CHANGE UP (ref 6–8.3)
PROTHROM AB SERPL-ACNC: 13.7 SEC — HIGH (ref 9.9–13.4)
RBC # BLD: 3.05 M/UL — LOW (ref 4.2–5.8)
RBC # FLD: 14.6 % — HIGH (ref 10.3–14.5)
S AUREUS DNA NOSE QL NAA+PROBE: SIGNIFICANT CHANGE UP
SODIUM SERPL-SCNC: 138 MMOL/L — SIGNIFICANT CHANGE UP (ref 135–145)
WBC # BLD: 7.14 K/UL — SIGNIFICANT CHANGE UP (ref 3.8–10.5)
WBC # FLD AUTO: 7.14 K/UL — SIGNIFICANT CHANGE UP (ref 3.8–10.5)

## 2025-08-20 PROCEDURE — 99233 SBSQ HOSP IP/OBS HIGH 50: CPT | Mod: 25

## 2025-08-20 PROCEDURE — G0545: CPT

## 2025-08-20 PROCEDURE — 99222 1ST HOSP IP/OBS MODERATE 55: CPT | Mod: GC

## 2025-08-20 RX ORDER — HYDROMORPHONE/SOD CHLOR,ISO/PF 2 MG/10 ML
4 SYRINGE (ML) INJECTION ONCE
Refills: 0 | Status: DISCONTINUED | OUTPATIENT
Start: 2025-08-20 | End: 2025-08-20

## 2025-08-20 RX ORDER — HYDROMORPHONE/SOD CHLOR,ISO/PF 2 MG/10 ML
4 SYRINGE (ML) INJECTION EVERY 12 HOURS
Refills: 0 | Status: DISCONTINUED | OUTPATIENT
Start: 2025-08-20 | End: 2025-08-20

## 2025-08-20 RX ORDER — B1/B2/B3/B5/B6/B12/VIT C/FOLIC 500-0.5 MG
1 TABLET ORAL DAILY
Refills: 0 | Status: DISCONTINUED | OUTPATIENT
Start: 2025-08-20 | End: 2025-08-21

## 2025-08-20 RX ORDER — HYDROMORPHONE/SOD CHLOR,ISO/PF 2 MG/10 ML
4 SYRINGE (ML) INJECTION EVERY 8 HOURS
Refills: 0 | Status: DISCONTINUED | OUTPATIENT
Start: 2025-08-20 | End: 2025-08-21

## 2025-08-20 RX ADMIN — EZETIMIBE 10 MILLIGRAM(S): 10 TABLET ORAL at 21:41

## 2025-08-20 RX ADMIN — Medication 3 MILLIGRAM(S): at 21:47

## 2025-08-20 RX ADMIN — Medication 4 MILLIGRAM(S): at 09:51

## 2025-08-20 RX ADMIN — Medication 25 GRAM(S): at 03:43

## 2025-08-20 RX ADMIN — BUPROPION HYDROBROMIDE 300 MILLIGRAM(S): 522 TABLET, EXTENDED RELEASE ORAL at 11:06

## 2025-08-20 RX ADMIN — Medication 40 MILLIEQUIVALENT(S): at 08:25

## 2025-08-20 RX ADMIN — MIRABEGRON 50 MILLIGRAM(S): 50 TABLET, FILM COATED, EXTENDED RELEASE ORAL at 11:06

## 2025-08-20 RX ADMIN — BICTEGRAVIR SODIUM, EMTRICITABINE, AND TENOFOVIR ALAFENAMIDE FUMARATE 1 TABLET(S): 50; 200; 25 TABLET ORAL at 11:06

## 2025-08-20 RX ADMIN — POLYETHYLENE GLYCOL 3350 17 GRAM(S): 17 POWDER, FOR SOLUTION ORAL at 11:05

## 2025-08-20 RX ADMIN — Medication 2 TABLET(S): at 21:41

## 2025-08-20 RX ADMIN — Medication 4 MILLIGRAM(S): at 08:51

## 2025-08-20 RX ADMIN — Medication 1 APPLICATION(S): at 08:26

## 2025-08-20 RX ADMIN — Medication 25 GRAM(S): at 21:47

## 2025-08-20 RX ADMIN — Medication 25 GRAM(S): at 10:09

## 2025-08-20 RX ADMIN — Medication 1000 MILLIGRAM(S): at 01:23

## 2025-08-20 RX ADMIN — TAMSULOSIN HYDROCHLORIDE 0.4 MILLIGRAM(S): 0.4 CAPSULE ORAL at 21:41

## 2025-08-20 RX ADMIN — Medication 50 MILLIGRAM(S): at 21:41

## 2025-08-20 RX ADMIN — Medication 5 MILLIGRAM(S): at 00:19

## 2025-08-20 RX ADMIN — Medication 25 GRAM(S): at 16:01

## 2025-08-20 RX ADMIN — MIRTAZAPINE 7.5 MILLIGRAM(S): 30 TABLET, FILM COATED ORAL at 21:41

## 2025-08-21 DIAGNOSIS — R52 PAIN, UNSPECIFIED: ICD-10-CM

## 2025-08-21 DIAGNOSIS — K59.00 CONSTIPATION, UNSPECIFIED: ICD-10-CM

## 2025-08-21 DIAGNOSIS — Z51.5 ENCOUNTER FOR PALLIATIVE CARE: ICD-10-CM

## 2025-08-21 DIAGNOSIS — R11.2 NAUSEA WITH VOMITING, UNSPECIFIED: ICD-10-CM

## 2025-08-21 DIAGNOSIS — Z71.89 OTHER SPECIFIED COUNSELING: ICD-10-CM

## 2025-08-21 LAB
ANION GAP SERPL CALC-SCNC: 12 MMOL/L — SIGNIFICANT CHANGE UP (ref 7–14)
APTT BLD: 28.8 SEC — SIGNIFICANT CHANGE UP (ref 26.1–36.8)
BLD GP AB SCN SERPL QL: NEGATIVE — SIGNIFICANT CHANGE UP
BUN SERPL-MCNC: 7 MG/DL — SIGNIFICANT CHANGE UP (ref 7–23)
CALCIUM SERPL-MCNC: 9 MG/DL — SIGNIFICANT CHANGE UP (ref 8.4–10.5)
CHLORIDE SERPL-SCNC: 104 MMOL/L — SIGNIFICANT CHANGE UP (ref 98–107)
CO2 SERPL-SCNC: 22 MMOL/L — SIGNIFICANT CHANGE UP (ref 22–31)
CREAT SERPL-MCNC: 0.82 MG/DL — SIGNIFICANT CHANGE UP (ref 0.5–1.3)
EGFR: 101 ML/MIN/1.73M2 — SIGNIFICANT CHANGE UP
EGFR: 101 ML/MIN/1.73M2 — SIGNIFICANT CHANGE UP
GLUCOSE SERPL-MCNC: 105 MG/DL — HIGH (ref 70–99)
HCT VFR BLD CALC: 27 % — LOW (ref 39–50)
HGB BLD-MCNC: 8.6 G/DL — LOW (ref 13–17)
INR BLD: 1.13 RATIO — SIGNIFICANT CHANGE UP (ref 0.85–1.16)
MAGNESIUM SERPL-MCNC: 2.1 MG/DL — SIGNIFICANT CHANGE UP (ref 1.6–2.6)
MCHC RBC-ENTMCNC: 28.1 PG — SIGNIFICANT CHANGE UP (ref 27–34)
MCHC RBC-ENTMCNC: 31.9 G/DL — LOW (ref 32–36)
MCV RBC AUTO: 88.2 FL — SIGNIFICANT CHANGE UP (ref 80–100)
NRBC # BLD AUTO: 0 K/UL — SIGNIFICANT CHANGE UP (ref 0–0)
NRBC # FLD: 0 K/UL — SIGNIFICANT CHANGE UP (ref 0–0)
NRBC BLD AUTO-RTO: 0 /100 WBCS — SIGNIFICANT CHANGE UP (ref 0–0)
PHOSPHATE SERPL-MCNC: 3.5 MG/DL — SIGNIFICANT CHANGE UP (ref 2.5–4.5)
PLATELET # BLD AUTO: 268 K/UL — SIGNIFICANT CHANGE UP (ref 150–400)
PMV BLD: 9.3 FL — SIGNIFICANT CHANGE UP (ref 7–13)
POTASSIUM SERPL-MCNC: 4 MMOL/L — SIGNIFICANT CHANGE UP (ref 3.5–5.3)
POTASSIUM SERPL-SCNC: 4 MMOL/L — SIGNIFICANT CHANGE UP (ref 3.5–5.3)
PROTHROM AB SERPL-ACNC: 13.1 SEC — SIGNIFICANT CHANGE UP (ref 9.9–13.4)
RBC # BLD: 3.06 M/UL — LOW (ref 4.2–5.8)
RBC # FLD: 14.6 % — HIGH (ref 10.3–14.5)
RH IG SCN BLD-IMP: NEGATIVE — SIGNIFICANT CHANGE UP
SODIUM SERPL-SCNC: 138 MMOL/L — SIGNIFICANT CHANGE UP (ref 135–145)
WBC # BLD: 5.06 K/UL — SIGNIFICANT CHANGE UP (ref 3.8–10.5)
WBC # FLD AUTO: 5.06 K/UL — SIGNIFICANT CHANGE UP (ref 3.8–10.5)

## 2025-08-21 PROCEDURE — 99223 1ST HOSP IP/OBS HIGH 75: CPT | Mod: 25

## 2025-08-21 PROCEDURE — G0545: CPT

## 2025-08-21 PROCEDURE — 99497 ADVNCD CARE PLAN 30 MIN: CPT | Mod: 25

## 2025-08-21 PROCEDURE — 99232 SBSQ HOSP IP/OBS MODERATE 35: CPT

## 2025-08-21 PROCEDURE — 99233 SBSQ HOSP IP/OBS HIGH 50: CPT | Mod: 25

## 2025-08-21 RX ORDER — B1/B2/B3/B5/B6/B12/VIT C/FOLIC 500-0.5 MG
1 TABLET ORAL
Refills: 0 | Status: DISCONTINUED | OUTPATIENT
Start: 2025-08-22 | End: 2025-08-29

## 2025-08-21 RX ORDER — HYDROMORPHONE/SOD CHLOR,ISO/PF 2 MG/10 ML
8 SYRINGE (ML) INJECTION EVERY 4 HOURS
Refills: 0 | Status: DISCONTINUED | OUTPATIENT
Start: 2025-08-21 | End: 2025-08-25

## 2025-08-21 RX ORDER — ONDANSETRON HCL/PF 4 MG/2 ML
4 VIAL (ML) INJECTION EVERY 8 HOURS
Refills: 0 | Status: DISCONTINUED | OUTPATIENT
Start: 2025-08-21 | End: 2025-08-29

## 2025-08-21 RX ORDER — LACTULOSE 10 G/15ML
10 SOLUTION ORAL DAILY
Refills: 0 | Status: DISCONTINUED | OUTPATIENT
Start: 2025-08-21 | End: 2025-08-22

## 2025-08-21 RX ORDER — HYDROMORPHONE/SOD CHLOR,ISO/PF 2 MG/10 ML
4 SYRINGE (ML) INJECTION EVERY 4 HOURS
Refills: 0 | Status: DISCONTINUED | OUTPATIENT
Start: 2025-08-21 | End: 2025-08-25

## 2025-08-21 RX ORDER — HYDROMORPHONE/SOD CHLOR,ISO/PF 2 MG/10 ML
1 SYRINGE (ML) INJECTION
Refills: 0 | Status: DISCONTINUED | OUTPATIENT
Start: 2025-08-21 | End: 2025-08-25

## 2025-08-21 RX ADMIN — Medication 5 MILLIGRAM(S): at 21:57

## 2025-08-21 RX ADMIN — Medication 2 TABLET(S): at 21:52

## 2025-08-21 RX ADMIN — Medication 20 MILLIGRAM(S): at 17:07

## 2025-08-21 RX ADMIN — EZETIMIBE 10 MILLIGRAM(S): 10 TABLET ORAL at 21:52

## 2025-08-21 RX ADMIN — TAMSULOSIN HYDROCHLORIDE 0.4 MILLIGRAM(S): 0.4 CAPSULE ORAL at 21:52

## 2025-08-21 RX ADMIN — Medication 4 MILLIGRAM(S): at 09:57

## 2025-08-21 RX ADMIN — POLYETHYLENE GLYCOL 3350 17 GRAM(S): 17 POWDER, FOR SOLUTION ORAL at 11:32

## 2025-08-21 RX ADMIN — BUPROPION HYDROBROMIDE 300 MILLIGRAM(S): 522 TABLET, EXTENDED RELEASE ORAL at 11:32

## 2025-08-21 RX ADMIN — Medication 25 GRAM(S): at 13:20

## 2025-08-21 RX ADMIN — CLONAZEPAM 1 MILLIGRAM(S): 0.5 TABLET ORAL at 21:56

## 2025-08-21 RX ADMIN — BICTEGRAVIR SODIUM, EMTRICITABINE, AND TENOFOVIR ALAFENAMIDE FUMARATE 1 TABLET(S): 50; 200; 25 TABLET ORAL at 11:32

## 2025-08-21 RX ADMIN — Medication 1 MILLIGRAM(S): at 17:28

## 2025-08-21 RX ADMIN — Medication 20 MILLIGRAM(S): at 05:19

## 2025-08-21 RX ADMIN — Medication 1 APPLICATION(S): at 05:19

## 2025-08-21 RX ADMIN — MIRTAZAPINE 7.5 MILLIGRAM(S): 30 TABLET, FILM COATED ORAL at 21:52

## 2025-08-21 RX ADMIN — Medication 8 MILLIGRAM(S): at 14:07

## 2025-08-21 RX ADMIN — Medication 8 MILLIGRAM(S): at 15:07

## 2025-08-21 RX ADMIN — Medication 1 MILLIGRAM(S): at 16:28

## 2025-08-21 RX ADMIN — Medication 4 MILLIGRAM(S): at 10:57

## 2025-08-21 RX ADMIN — MIRABEGRON 50 MILLIGRAM(S): 50 TABLET, FILM COATED, EXTENDED RELEASE ORAL at 11:32

## 2025-08-21 RX ADMIN — Medication 25 GRAM(S): at 21:57

## 2025-08-21 RX ADMIN — Medication 25 GRAM(S): at 05:20

## 2025-08-21 RX ADMIN — Medication 50 MILLIGRAM(S): at 21:52

## 2025-08-22 ENCOUNTER — TRANSCRIPTION ENCOUNTER (OUTPATIENT)
Age: 60
End: 2025-08-22

## 2025-08-22 LAB
ANION GAP SERPL CALC-SCNC: 11 MMOL/L — SIGNIFICANT CHANGE UP (ref 7–14)
APTT BLD: 25.4 SEC — LOW (ref 26.1–36.8)
BUN SERPL-MCNC: 11 MG/DL — SIGNIFICANT CHANGE UP (ref 7–23)
CALCIUM SERPL-MCNC: 8.9 MG/DL — SIGNIFICANT CHANGE UP (ref 8.4–10.5)
CHLORIDE SERPL-SCNC: 102 MMOL/L — SIGNIFICANT CHANGE UP (ref 98–107)
CO2 SERPL-SCNC: 23 MMOL/L — SIGNIFICANT CHANGE UP (ref 22–31)
CREAT SERPL-MCNC: 0.9 MG/DL — SIGNIFICANT CHANGE UP (ref 0.5–1.3)
EGFR: 98 ML/MIN/1.73M2 — SIGNIFICANT CHANGE UP
EGFR: 98 ML/MIN/1.73M2 — SIGNIFICANT CHANGE UP
GLUCOSE SERPL-MCNC: 91 MG/DL — SIGNIFICANT CHANGE UP (ref 70–99)
HCT VFR BLD CALC: 29.2 % — LOW (ref 39–50)
HGB BLD-MCNC: 9.2 G/DL — LOW (ref 13–17)
HIV-1 VIRAL LOAD RESULT: SIGNIFICANT CHANGE UP
HIV1 RNA # SERPL NAA+PROBE: SIGNIFICANT CHANGE UP COPIES/ML
HIV1 RNA SER-IMP: SIGNIFICANT CHANGE UP
HIV1 RNA SERPL NAA+PROBE-ACNC: SIGNIFICANT CHANGE UP
HIV1 RNA SERPL NAA+PROBE-LOG#: SIGNIFICANT CHANGE UP LG COP/ML
INR BLD: 1.12 RATIO — SIGNIFICANT CHANGE UP (ref 0.85–1.16)
MAGNESIUM SERPL-MCNC: 2.2 MG/DL — SIGNIFICANT CHANGE UP (ref 1.6–2.6)
MCHC RBC-ENTMCNC: 28.1 PG — SIGNIFICANT CHANGE UP (ref 27–34)
MCHC RBC-ENTMCNC: 31.5 G/DL — LOW (ref 32–36)
MCV RBC AUTO: 89.3 FL — SIGNIFICANT CHANGE UP (ref 80–100)
NRBC # BLD AUTO: 0 K/UL — SIGNIFICANT CHANGE UP (ref 0–0)
NRBC # FLD: 0 K/UL — SIGNIFICANT CHANGE UP (ref 0–0)
NRBC BLD AUTO-RTO: 0 /100 WBCS — SIGNIFICANT CHANGE UP (ref 0–0)
PHOSPHATE SERPL-MCNC: 4.1 MG/DL — SIGNIFICANT CHANGE UP (ref 2.5–4.5)
PLATELET # BLD AUTO: 266 K/UL — SIGNIFICANT CHANGE UP (ref 150–400)
PMV BLD: 9.2 FL — SIGNIFICANT CHANGE UP (ref 7–13)
POTASSIUM SERPL-MCNC: 4 MMOL/L — SIGNIFICANT CHANGE UP (ref 3.5–5.3)
POTASSIUM SERPL-SCNC: 4 MMOL/L — SIGNIFICANT CHANGE UP (ref 3.5–5.3)
PROTHROM AB SERPL-ACNC: 13 SEC — SIGNIFICANT CHANGE UP (ref 9.9–13.4)
RBC # BLD: 3.27 M/UL — LOW (ref 4.2–5.8)
RBC # FLD: 14.6 % — HIGH (ref 10.3–14.5)
SODIUM SERPL-SCNC: 136 MMOL/L — SIGNIFICANT CHANGE UP (ref 135–145)
WBC # BLD: 4 K/UL — SIGNIFICANT CHANGE UP (ref 3.8–10.5)
WBC # FLD AUTO: 4 K/UL — SIGNIFICANT CHANGE UP (ref 3.8–10.5)

## 2025-08-22 PROCEDURE — 37242 VASC EMBOLIZE/OCCLUDE ARTERY: CPT

## 2025-08-22 PROCEDURE — 76937 US GUIDE VASCULAR ACCESS: CPT | Mod: 26

## 2025-08-22 PROCEDURE — 99233 SBSQ HOSP IP/OBS HIGH 50: CPT | Mod: 25

## 2025-08-22 PROCEDURE — G0545: CPT

## 2025-08-22 PROCEDURE — 36245 INS CATH ABD/L-EXT ART 1ST: CPT | Mod: RT

## 2025-08-22 PROCEDURE — 99233 SBSQ HOSP IP/OBS HIGH 50: CPT

## 2025-08-22 PROCEDURE — 99232 SBSQ HOSP IP/OBS MODERATE 35: CPT

## 2025-08-22 RX ORDER — ONDANSETRON HCL/PF 4 MG/2 ML
4 VIAL (ML) INJECTION ONCE
Refills: 0 | Status: DISCONTINUED | OUTPATIENT
Start: 2025-08-22 | End: 2025-08-23

## 2025-08-22 RX ORDER — FENTANYL CITRATE-0.9 % NACL/PF 100MCG/2ML
25 SYRINGE (ML) INTRAVENOUS
Refills: 0 | Status: DISCONTINUED | OUTPATIENT
Start: 2025-08-22 | End: 2025-08-23

## 2025-08-22 RX ORDER — NALOXEGOL OXALATE 12.5 MG/1
25 TABLET, FILM COATED ORAL DAILY
Refills: 0 | Status: DISCONTINUED | OUTPATIENT
Start: 2025-08-22 | End: 2025-08-29

## 2025-08-22 RX ADMIN — MIRTAZAPINE 7.5 MILLIGRAM(S): 30 TABLET, FILM COATED ORAL at 21:34

## 2025-08-22 RX ADMIN — Medication 1 APPLICATION(S): at 06:00

## 2025-08-22 RX ADMIN — NALOXEGOL OXALATE 25 MILLIGRAM(S): 12.5 TABLET, FILM COATED ORAL at 14:35

## 2025-08-22 RX ADMIN — EZETIMIBE 10 MILLIGRAM(S): 10 TABLET ORAL at 21:34

## 2025-08-22 RX ADMIN — Medication 1 MILLIGRAM(S): at 10:09

## 2025-08-22 RX ADMIN — Medication 50 MILLIGRAM(S): at 21:33

## 2025-08-22 RX ADMIN — Medication 20 MILLIGRAM(S): at 05:59

## 2025-08-22 RX ADMIN — Medication 25 MICROGRAM(S): at 19:29

## 2025-08-22 RX ADMIN — MIRABEGRON 50 MILLIGRAM(S): 50 TABLET, FILM COATED, EXTENDED RELEASE ORAL at 12:00

## 2025-08-22 RX ADMIN — TAMSULOSIN HYDROCHLORIDE 0.4 MILLIGRAM(S): 0.4 CAPSULE ORAL at 21:34

## 2025-08-22 RX ADMIN — Medication 25 GRAM(S): at 06:00

## 2025-08-22 RX ADMIN — Medication 1 MILLIGRAM(S): at 23:08

## 2025-08-22 RX ADMIN — Medication 1 MILLIGRAM(S): at 09:39

## 2025-08-22 RX ADMIN — Medication 8 MILLIGRAM(S): at 07:06

## 2025-08-22 RX ADMIN — Medication 8 MILLIGRAM(S): at 06:06

## 2025-08-22 RX ADMIN — BICTEGRAVIR SODIUM, EMTRICITABINE, AND TENOFOVIR ALAFENAMIDE FUMARATE 1 TABLET(S): 50; 200; 25 TABLET ORAL at 12:00

## 2025-08-22 RX ADMIN — BUPROPION HYDROBROMIDE 300 MILLIGRAM(S): 522 TABLET, EXTENDED RELEASE ORAL at 12:00

## 2025-08-22 RX ADMIN — Medication 8 MILLIGRAM(S): at 21:33

## 2025-08-22 RX ADMIN — Medication 1 TABLET(S): at 06:00

## 2025-08-22 RX ADMIN — Medication 25 GRAM(S): at 14:38

## 2025-08-22 RX ADMIN — POLYETHYLENE GLYCOL 3350 17 GRAM(S): 17 POWDER, FOR SOLUTION ORAL at 12:00

## 2025-08-22 RX ADMIN — Medication 25 GRAM(S): at 21:38

## 2025-08-22 RX ADMIN — Medication 25 MICROGRAM(S): at 19:59

## 2025-08-22 RX ADMIN — Medication 8 MILLIGRAM(S): at 22:33

## 2025-08-23 LAB
ALBUMIN SERPL ELPH-MCNC: 3.8 G/DL — SIGNIFICANT CHANGE UP (ref 3.3–5)
ALP SERPL-CCNC: 78 U/L — SIGNIFICANT CHANGE UP (ref 40–120)
ALT FLD-CCNC: 5 U/L — SIGNIFICANT CHANGE UP (ref 4–41)
ANION GAP SERPL CALC-SCNC: 11 MMOL/L — SIGNIFICANT CHANGE UP (ref 7–14)
AST SERPL-CCNC: 10 U/L — SIGNIFICANT CHANGE UP (ref 4–40)
BASOPHILS # BLD AUTO: 0.04 K/UL — SIGNIFICANT CHANGE UP (ref 0–0.2)
BASOPHILS NFR BLD AUTO: 0.6 % — SIGNIFICANT CHANGE UP (ref 0–2)
BILIRUB SERPL-MCNC: 0.2 MG/DL — SIGNIFICANT CHANGE UP (ref 0.2–1.2)
BLD GP AB SCN SERPL QL: NEGATIVE — SIGNIFICANT CHANGE UP
BUN SERPL-MCNC: 12 MG/DL — SIGNIFICANT CHANGE UP (ref 7–23)
CALCIUM SERPL-MCNC: 9.1 MG/DL — SIGNIFICANT CHANGE UP (ref 8.4–10.5)
CHLORIDE SERPL-SCNC: 104 MMOL/L — SIGNIFICANT CHANGE UP (ref 98–107)
CO2 SERPL-SCNC: 24 MMOL/L — SIGNIFICANT CHANGE UP (ref 22–31)
CREAT SERPL-MCNC: 1 MG/DL — SIGNIFICANT CHANGE UP (ref 0.5–1.3)
EGFR: 87 ML/MIN/1.73M2 — SIGNIFICANT CHANGE UP
EGFR: 87 ML/MIN/1.73M2 — SIGNIFICANT CHANGE UP
EOSINOPHIL # BLD AUTO: 0.05 K/UL — SIGNIFICANT CHANGE UP (ref 0–0.5)
EOSINOPHIL NFR BLD AUTO: 0.7 % — SIGNIFICANT CHANGE UP (ref 0–6)
GLUCOSE SERPL-MCNC: 84 MG/DL — SIGNIFICANT CHANGE UP (ref 70–99)
HCT VFR BLD CALC: 28.3 % — LOW (ref 39–50)
HGB BLD-MCNC: 8.9 G/DL — LOW (ref 13–17)
IMM GRANULOCYTES # BLD AUTO: 0.02 K/UL — SIGNIFICANT CHANGE UP (ref 0–0.07)
IMM GRANULOCYTES NFR BLD AUTO: 0.3 % — SIGNIFICANT CHANGE UP (ref 0–0.9)
LYMPHOCYTES # BLD AUTO: 1.93 K/UL — SIGNIFICANT CHANGE UP (ref 1–3.3)
LYMPHOCYTES NFR BLD AUTO: 28.6 % — SIGNIFICANT CHANGE UP (ref 13–44)
MAGNESIUM SERPL-MCNC: 2.2 MG/DL — SIGNIFICANT CHANGE UP (ref 1.6–2.6)
MCHC RBC-ENTMCNC: 27.8 PG — SIGNIFICANT CHANGE UP (ref 27–34)
MCHC RBC-ENTMCNC: 31.4 G/DL — LOW (ref 32–36)
MCV RBC AUTO: 88.4 FL — SIGNIFICANT CHANGE UP (ref 80–100)
MONOCYTES # BLD AUTO: 0.79 K/UL — SIGNIFICANT CHANGE UP (ref 0–0.9)
MONOCYTES NFR BLD AUTO: 11.7 % — SIGNIFICANT CHANGE UP (ref 2–14)
NEUTROPHILS # BLD AUTO: 3.92 K/UL — SIGNIFICANT CHANGE UP (ref 1.8–7.4)
NEUTROPHILS NFR BLD AUTO: 58.1 % — SIGNIFICANT CHANGE UP (ref 43–77)
NRBC # BLD AUTO: 0 K/UL — SIGNIFICANT CHANGE UP (ref 0–0)
NRBC # FLD: 0 K/UL — SIGNIFICANT CHANGE UP (ref 0–0)
NRBC BLD AUTO-RTO: 0 /100 WBCS — SIGNIFICANT CHANGE UP (ref 0–0)
PHOSPHATE SERPL-MCNC: 3.6 MG/DL — SIGNIFICANT CHANGE UP (ref 2.5–4.5)
PLATELET # BLD AUTO: 321 K/UL — SIGNIFICANT CHANGE UP (ref 150–400)
PMV BLD: 9.7 FL — SIGNIFICANT CHANGE UP (ref 7–13)
POTASSIUM SERPL-MCNC: 3.8 MMOL/L — SIGNIFICANT CHANGE UP (ref 3.5–5.3)
POTASSIUM SERPL-SCNC: 3.8 MMOL/L — SIGNIFICANT CHANGE UP (ref 3.5–5.3)
PROT SERPL-MCNC: 7.1 G/DL — SIGNIFICANT CHANGE UP (ref 6–8.3)
RBC # BLD: 3.2 M/UL — LOW (ref 4.2–5.8)
RBC # FLD: 14.7 % — HIGH (ref 10.3–14.5)
RH IG SCN BLD-IMP: NEGATIVE — SIGNIFICANT CHANGE UP
SODIUM SERPL-SCNC: 139 MMOL/L — SIGNIFICANT CHANGE UP (ref 135–145)
WBC # BLD: 6.75 K/UL — SIGNIFICANT CHANGE UP (ref 3.8–10.5)
WBC # FLD AUTO: 6.75 K/UL — SIGNIFICANT CHANGE UP (ref 3.8–10.5)

## 2025-08-23 PROCEDURE — 99232 SBSQ HOSP IP/OBS MODERATE 35: CPT | Mod: GC

## 2025-08-23 RX ORDER — PIPERACILLIN-TAZO-DEXTROSE,ISO 3.375G/5
3.38 IV SOLUTION, PIGGYBACK PREMIX FROZEN(ML) INTRAVENOUS EVERY 8 HOURS
Refills: 0 | Status: CANCELLED | OUTPATIENT
Start: 2025-08-31 | End: 2025-08-29

## 2025-08-23 RX ORDER — HYDROMORPHONE/SOD CHLOR,ISO/PF 2 MG/10 ML
0.5 SYRINGE (ML) INJECTION ONCE
Refills: 0 | Status: DISCONTINUED | OUTPATIENT
Start: 2025-08-23 | End: 2025-08-23

## 2025-08-23 RX ADMIN — Medication 8 MILLIGRAM(S): at 23:07

## 2025-08-23 RX ADMIN — Medication 20 MILLIGRAM(S): at 17:05

## 2025-08-23 RX ADMIN — Medication 1 MILLIGRAM(S): at 18:00

## 2025-08-23 RX ADMIN — MIRTAZAPINE 7.5 MILLIGRAM(S): 30 TABLET, FILM COATED ORAL at 22:09

## 2025-08-23 RX ADMIN — Medication 1 MILLIGRAM(S): at 17:05

## 2025-08-23 RX ADMIN — Medication 20 MILLIGRAM(S): at 06:17

## 2025-08-23 RX ADMIN — Medication 8 MILLIGRAM(S): at 16:13

## 2025-08-23 RX ADMIN — Medication 25 GRAM(S): at 14:12

## 2025-08-23 RX ADMIN — Medication 1 APPLICATION(S): at 06:24

## 2025-08-23 RX ADMIN — Medication 25 GRAM(S): at 06:16

## 2025-08-23 RX ADMIN — Medication 0.5 MILLIGRAM(S): at 10:30

## 2025-08-23 RX ADMIN — BICTEGRAVIR SODIUM, EMTRICITABINE, AND TENOFOVIR ALAFENAMIDE FUMARATE 1 TABLET(S): 50; 200; 25 TABLET ORAL at 11:42

## 2025-08-23 RX ADMIN — Medication 1 TABLET(S): at 06:17

## 2025-08-23 RX ADMIN — Medication 8 MILLIGRAM(S): at 07:16

## 2025-08-23 RX ADMIN — Medication 50 MILLIGRAM(S): at 22:08

## 2025-08-23 RX ADMIN — MIRABEGRON 50 MILLIGRAM(S): 50 TABLET, FILM COATED, EXTENDED RELEASE ORAL at 15:11

## 2025-08-23 RX ADMIN — EZETIMIBE 10 MILLIGRAM(S): 10 TABLET ORAL at 22:09

## 2025-08-23 RX ADMIN — Medication 8 MILLIGRAM(S): at 06:16

## 2025-08-23 RX ADMIN — Medication 0.5 MILLIGRAM(S): at 09:44

## 2025-08-23 RX ADMIN — NALOXEGOL OXALATE 25 MILLIGRAM(S): 12.5 TABLET, FILM COATED ORAL at 11:42

## 2025-08-23 RX ADMIN — Medication 1 MILLIGRAM(S): at 00:08

## 2025-08-23 RX ADMIN — Medication 8 MILLIGRAM(S): at 15:35

## 2025-08-23 RX ADMIN — BUPROPION HYDROBROMIDE 300 MILLIGRAM(S): 522 TABLET, EXTENDED RELEASE ORAL at 11:42

## 2025-08-23 RX ADMIN — Medication 1 MILLIGRAM(S): at 07:41

## 2025-08-23 RX ADMIN — Medication 8 MILLIGRAM(S): at 22:07

## 2025-08-23 RX ADMIN — Medication 1 MILLIGRAM(S): at 23:58

## 2025-08-23 RX ADMIN — TAMSULOSIN HYDROCHLORIDE 0.4 MILLIGRAM(S): 0.4 CAPSULE ORAL at 22:08

## 2025-08-23 RX ADMIN — Medication 5 MILLIGRAM(S): at 23:59

## 2025-08-23 RX ADMIN — Medication 25 GRAM(S): at 22:06

## 2025-08-24 DIAGNOSIS — Z01.818 ENCOUNTER FOR OTHER PREPROCEDURAL EXAMINATION: ICD-10-CM

## 2025-08-24 LAB
ALBUMIN SERPL ELPH-MCNC: 3.6 G/DL — SIGNIFICANT CHANGE UP (ref 3.3–5)
ALP SERPL-CCNC: 79 U/L — SIGNIFICANT CHANGE UP (ref 40–120)
ALT FLD-CCNC: <5 U/L — SIGNIFICANT CHANGE UP (ref 4–41)
ANION GAP SERPL CALC-SCNC: 11 MMOL/L — SIGNIFICANT CHANGE UP (ref 7–14)
AST SERPL-CCNC: 8 U/L — SIGNIFICANT CHANGE UP (ref 4–40)
BASOPHILS # BLD AUTO: 0.04 K/UL — SIGNIFICANT CHANGE UP (ref 0–0.2)
BASOPHILS NFR BLD AUTO: 0.6 % — SIGNIFICANT CHANGE UP (ref 0–2)
BILIRUB SERPL-MCNC: <0.2 MG/DL — SIGNIFICANT CHANGE UP (ref 0.2–1.2)
BUN SERPL-MCNC: 16 MG/DL — SIGNIFICANT CHANGE UP (ref 7–23)
CALCIUM SERPL-MCNC: 8.8 MG/DL — SIGNIFICANT CHANGE UP (ref 8.4–10.5)
CHLORIDE SERPL-SCNC: 101 MMOL/L — SIGNIFICANT CHANGE UP (ref 98–107)
CO2 SERPL-SCNC: 25 MMOL/L — SIGNIFICANT CHANGE UP (ref 22–31)
CREAT SERPL-MCNC: 1.05 MG/DL — SIGNIFICANT CHANGE UP (ref 0.5–1.3)
EGFR: 82 ML/MIN/1.73M2 — SIGNIFICANT CHANGE UP
EGFR: 82 ML/MIN/1.73M2 — SIGNIFICANT CHANGE UP
EOSINOPHIL # BLD AUTO: 0.12 K/UL — SIGNIFICANT CHANGE UP (ref 0–0.5)
EOSINOPHIL NFR BLD AUTO: 1.8 % — SIGNIFICANT CHANGE UP (ref 0–6)
GLUCOSE SERPL-MCNC: 105 MG/DL — HIGH (ref 70–99)
HCT VFR BLD CALC: 27.3 % — LOW (ref 39–50)
HGB BLD-MCNC: 8.7 G/DL — LOW (ref 13–17)
IMM GRANULOCYTES # BLD AUTO: 0.02 K/UL — SIGNIFICANT CHANGE UP (ref 0–0.07)
IMM GRANULOCYTES NFR BLD AUTO: 0.3 % — SIGNIFICANT CHANGE UP (ref 0–0.9)
LYMPHOCYTES # BLD AUTO: 2.19 K/UL — SIGNIFICANT CHANGE UP (ref 1–3.3)
LYMPHOCYTES NFR BLD AUTO: 32.8 % — SIGNIFICANT CHANGE UP (ref 13–44)
MAGNESIUM SERPL-MCNC: 2.2 MG/DL — SIGNIFICANT CHANGE UP (ref 1.6–2.6)
MCHC RBC-ENTMCNC: 28.1 PG — SIGNIFICANT CHANGE UP (ref 27–34)
MCHC RBC-ENTMCNC: 31.9 G/DL — LOW (ref 32–36)
MCV RBC AUTO: 88.1 FL — SIGNIFICANT CHANGE UP (ref 80–100)
MONOCYTES # BLD AUTO: 0.97 K/UL — HIGH (ref 0–0.9)
MONOCYTES NFR BLD AUTO: 14.5 % — HIGH (ref 2–14)
NEUTROPHILS # BLD AUTO: 3.34 K/UL — SIGNIFICANT CHANGE UP (ref 1.8–7.4)
NEUTROPHILS NFR BLD AUTO: 50 % — SIGNIFICANT CHANGE UP (ref 43–77)
NRBC # BLD AUTO: 0 K/UL — SIGNIFICANT CHANGE UP (ref 0–0)
NRBC # FLD: 0 K/UL — SIGNIFICANT CHANGE UP (ref 0–0)
NRBC BLD AUTO-RTO: 0 /100 WBCS — SIGNIFICANT CHANGE UP (ref 0–0)
PHOSPHATE SERPL-MCNC: 3.5 MG/DL — SIGNIFICANT CHANGE UP (ref 2.5–4.5)
PLATELET # BLD AUTO: 325 K/UL — SIGNIFICANT CHANGE UP (ref 150–400)
PMV BLD: 9.6 FL — SIGNIFICANT CHANGE UP (ref 7–13)
POTASSIUM SERPL-MCNC: 4 MMOL/L — SIGNIFICANT CHANGE UP (ref 3.5–5.3)
POTASSIUM SERPL-SCNC: 4 MMOL/L — SIGNIFICANT CHANGE UP (ref 3.5–5.3)
PROT SERPL-MCNC: 7 G/DL — SIGNIFICANT CHANGE UP (ref 6–8.3)
RBC # BLD: 3.1 M/UL — LOW (ref 4.2–5.8)
RBC # FLD: 14.6 % — HIGH (ref 10.3–14.5)
SODIUM SERPL-SCNC: 137 MMOL/L — SIGNIFICANT CHANGE UP (ref 135–145)
WBC # BLD: 6.68 K/UL — SIGNIFICANT CHANGE UP (ref 3.8–10.5)
WBC # FLD AUTO: 6.68 K/UL — SIGNIFICANT CHANGE UP (ref 3.8–10.5)

## 2025-08-24 PROCEDURE — 93926 LOWER EXTREMITY STUDY: CPT | Mod: 26,50,LT

## 2025-08-24 PROCEDURE — 99232 SBSQ HOSP IP/OBS MODERATE 35: CPT | Mod: GC

## 2025-08-24 RX ADMIN — Medication 8 MILLIGRAM(S): at 18:39

## 2025-08-24 RX ADMIN — BICTEGRAVIR SODIUM, EMTRICITABINE, AND TENOFOVIR ALAFENAMIDE FUMARATE 1 TABLET(S): 50; 200; 25 TABLET ORAL at 11:58

## 2025-08-24 RX ADMIN — EZETIMIBE 10 MILLIGRAM(S): 10 TABLET ORAL at 21:10

## 2025-08-24 RX ADMIN — Medication 1 APPLICATION(S): at 05:50

## 2025-08-24 RX ADMIN — Medication 1 MILLIGRAM(S): at 12:05

## 2025-08-24 RX ADMIN — Medication 1 MILLIGRAM(S): at 23:04

## 2025-08-24 RX ADMIN — NALOXEGOL OXALATE 25 MILLIGRAM(S): 12.5 TABLET, FILM COATED ORAL at 11:58

## 2025-08-24 RX ADMIN — Medication 1 MILLIGRAM(S): at 00:58

## 2025-08-24 RX ADMIN — Medication 1 MILLIGRAM(S): at 19:49

## 2025-08-24 RX ADMIN — Medication 8 MILLIGRAM(S): at 10:10

## 2025-08-24 RX ADMIN — MIRTAZAPINE 7.5 MILLIGRAM(S): 30 TABLET, FILM COATED ORAL at 21:20

## 2025-08-24 RX ADMIN — Medication 8 MILLIGRAM(S): at 11:10

## 2025-08-24 RX ADMIN — Medication 20 MILLIGRAM(S): at 17:39

## 2025-08-24 RX ADMIN — Medication 25 GRAM(S): at 05:51

## 2025-08-24 RX ADMIN — Medication 5 MILLIGRAM(S): at 23:05

## 2025-08-24 RX ADMIN — Medication 20 MILLIGRAM(S): at 05:47

## 2025-08-24 RX ADMIN — Medication 25 GRAM(S): at 14:39

## 2025-08-24 RX ADMIN — Medication 1 MILLIGRAM(S): at 18:49

## 2025-08-24 RX ADMIN — Medication 1 MILLIGRAM(S): at 13:05

## 2025-08-24 RX ADMIN — Medication 50 MILLIGRAM(S): at 21:24

## 2025-08-24 RX ADMIN — TAMSULOSIN HYDROCHLORIDE 0.4 MILLIGRAM(S): 0.4 CAPSULE ORAL at 21:15

## 2025-08-24 RX ADMIN — MIRABEGRON 50 MILLIGRAM(S): 50 TABLET, FILM COATED, EXTENDED RELEASE ORAL at 11:57

## 2025-08-24 RX ADMIN — Medication 2 TABLET(S): at 21:22

## 2025-08-24 RX ADMIN — Medication 8 MILLIGRAM(S): at 17:39

## 2025-08-24 RX ADMIN — BUPROPION HYDROBROMIDE 300 MILLIGRAM(S): 522 TABLET, EXTENDED RELEASE ORAL at 11:58

## 2025-08-24 RX ADMIN — Medication 25 GRAM(S): at 21:29

## 2025-08-24 RX ADMIN — Medication 1 TABLET(S): at 05:48

## 2025-08-25 ENCOUNTER — RX RENEWAL (OUTPATIENT)
Age: 60
End: 2025-08-25

## 2025-08-25 LAB
ALBUMIN SERPL ELPH-MCNC: 3.6 G/DL — SIGNIFICANT CHANGE UP (ref 3.3–5)
ALP SERPL-CCNC: 76 U/L — SIGNIFICANT CHANGE UP (ref 40–120)
ALT FLD-CCNC: 5 U/L — SIGNIFICANT CHANGE UP (ref 4–41)
ANION GAP SERPL CALC-SCNC: 10 MMOL/L — SIGNIFICANT CHANGE UP (ref 7–14)
APTT BLD: 29.6 SEC — SIGNIFICANT CHANGE UP (ref 26.1–36.8)
AST SERPL-CCNC: 12 U/L — SIGNIFICANT CHANGE UP (ref 4–40)
BASOPHILS # BLD AUTO: 0.04 K/UL — SIGNIFICANT CHANGE UP (ref 0–0.2)
BASOPHILS NFR BLD AUTO: 0.5 % — SIGNIFICANT CHANGE UP (ref 0–2)
BILIRUB SERPL-MCNC: <0.2 MG/DL — SIGNIFICANT CHANGE UP (ref 0.2–1.2)
BUN SERPL-MCNC: 14 MG/DL — SIGNIFICANT CHANGE UP (ref 7–23)
CALCIUM SERPL-MCNC: 9 MG/DL — SIGNIFICANT CHANGE UP (ref 8.4–10.5)
CHLORIDE SERPL-SCNC: 100 MMOL/L — SIGNIFICANT CHANGE UP (ref 98–107)
CO2 SERPL-SCNC: 24 MMOL/L — SIGNIFICANT CHANGE UP (ref 22–31)
CREAT SERPL-MCNC: 0.82 MG/DL — SIGNIFICANT CHANGE UP (ref 0.5–1.3)
CULTURE RESULTS: SIGNIFICANT CHANGE UP
CULTURE RESULTS: SIGNIFICANT CHANGE UP
EGFR: 101 ML/MIN/1.73M2 — SIGNIFICANT CHANGE UP
EGFR: 101 ML/MIN/1.73M2 — SIGNIFICANT CHANGE UP
EOSINOPHIL # BLD AUTO: 0.13 K/UL — SIGNIFICANT CHANGE UP (ref 0–0.5)
EOSINOPHIL NFR BLD AUTO: 1.6 % — SIGNIFICANT CHANGE UP (ref 0–6)
GLUCOSE SERPL-MCNC: 89 MG/DL — SIGNIFICANT CHANGE UP (ref 70–99)
HCT VFR BLD CALC: 27.9 % — LOW (ref 39–50)
HGB BLD-MCNC: 8.8 G/DL — LOW (ref 13–17)
IMM GRANULOCYTES # BLD AUTO: 0.03 K/UL — SIGNIFICANT CHANGE UP (ref 0–0.07)
IMM GRANULOCYTES NFR BLD AUTO: 0.4 % — SIGNIFICANT CHANGE UP (ref 0–0.9)
INR BLD: 1.16 RATIO — SIGNIFICANT CHANGE UP (ref 0.85–1.16)
LYMPHOCYTES # BLD AUTO: 1.73 K/UL — SIGNIFICANT CHANGE UP (ref 1–3.3)
LYMPHOCYTES NFR BLD AUTO: 21.8 % — SIGNIFICANT CHANGE UP (ref 13–44)
MAGNESIUM SERPL-MCNC: 2.1 MG/DL — SIGNIFICANT CHANGE UP (ref 1.6–2.6)
MCHC RBC-ENTMCNC: 28.2 PG — SIGNIFICANT CHANGE UP (ref 27–34)
MCHC RBC-ENTMCNC: 31.5 G/DL — LOW (ref 32–36)
MCV RBC AUTO: 89.4 FL — SIGNIFICANT CHANGE UP (ref 80–100)
MONOCYTES # BLD AUTO: 1.31 K/UL — HIGH (ref 0–0.9)
MONOCYTES NFR BLD AUTO: 16.5 % — HIGH (ref 2–14)
NEUTROPHILS # BLD AUTO: 4.7 K/UL — SIGNIFICANT CHANGE UP (ref 1.8–7.4)
NEUTROPHILS NFR BLD AUTO: 59.2 % — SIGNIFICANT CHANGE UP (ref 43–77)
NRBC # BLD AUTO: 0 K/UL — SIGNIFICANT CHANGE UP (ref 0–0)
NRBC # FLD: 0 K/UL — SIGNIFICANT CHANGE UP (ref 0–0)
NRBC BLD AUTO-RTO: 0 /100 WBCS — SIGNIFICANT CHANGE UP (ref 0–0)
PHOSPHATE SERPL-MCNC: 3.2 MG/DL — SIGNIFICANT CHANGE UP (ref 2.5–4.5)
PLATELET # BLD AUTO: 358 K/UL — SIGNIFICANT CHANGE UP (ref 150–400)
PMV BLD: 9.5 FL — SIGNIFICANT CHANGE UP (ref 7–13)
POTASSIUM SERPL-MCNC: 3.7 MMOL/L — SIGNIFICANT CHANGE UP (ref 3.5–5.3)
POTASSIUM SERPL-SCNC: 3.7 MMOL/L — SIGNIFICANT CHANGE UP (ref 3.5–5.3)
PROT SERPL-MCNC: 7 G/DL — SIGNIFICANT CHANGE UP (ref 6–8.3)
PROTHROM AB SERPL-ACNC: 13.4 SEC — SIGNIFICANT CHANGE UP (ref 9.9–13.4)
RBC # BLD: 3.12 M/UL — LOW (ref 4.2–5.8)
RBC # FLD: 14.6 % — HIGH (ref 10.3–14.5)
SODIUM SERPL-SCNC: 134 MMOL/L — LOW (ref 135–145)
SPECIMEN SOURCE: SIGNIFICANT CHANGE UP
SPECIMEN SOURCE: SIGNIFICANT CHANGE UP
WBC # BLD: 7.94 K/UL — SIGNIFICANT CHANGE UP (ref 3.8–10.5)
WBC # FLD AUTO: 7.94 K/UL — SIGNIFICANT CHANGE UP (ref 3.8–10.5)

## 2025-08-25 PROCEDURE — 99233 SBSQ HOSP IP/OBS HIGH 50: CPT | Mod: GC

## 2025-08-25 PROCEDURE — 99233 SBSQ HOSP IP/OBS HIGH 50: CPT

## 2025-08-25 PROCEDURE — 99223 1ST HOSP IP/OBS HIGH 75: CPT | Mod: GC

## 2025-08-25 PROCEDURE — 74174 CTA ABD&PLVS W/CONTRAST: CPT | Mod: 26

## 2025-08-25 PROCEDURE — 99024 POSTOP FOLLOW-UP VISIT: CPT

## 2025-08-25 PROCEDURE — G0545: CPT

## 2025-08-25 RX ORDER — METOPROLOL SUCCINATE 50 MG/1
100 TABLET, EXTENDED RELEASE ORAL DAILY
Refills: 0 | Status: DISCONTINUED | OUTPATIENT
Start: 2025-08-25 | End: 2025-08-29

## 2025-08-25 RX ORDER — PIPERACILLIN-TAZO-DEXTROSE,ISO 3.375G/5
4.5 IV SOLUTION, PIGGYBACK PREMIX FROZEN(ML) INTRAVENOUS
Qty: 66 | Refills: 0
Start: 2025-08-25 | End: 2025-09-15

## 2025-08-25 RX ORDER — PIPERACILLIN-TAZO-DEXTROSE,ISO 3.375G/5
4.5 IV SOLUTION, PIGGYBACK PREMIX FROZEN(ML) INTRAVENOUS
Qty: 66 | Refills: 0
Start: 2025-08-25 | End: 2025-09-14

## 2025-08-25 RX ORDER — HYDROMORPHONE/SOD CHLOR,ISO/PF 2 MG/10 ML
1 SYRINGE (ML) INJECTION
Refills: 0 | Status: DISCONTINUED | OUTPATIENT
Start: 2025-08-25 | End: 2025-08-26

## 2025-08-25 RX ORDER — GABAPENTIN 400 MG/1
100 CAPSULE ORAL EVERY 8 HOURS
Refills: 0 | Status: DISCONTINUED | OUTPATIENT
Start: 2025-08-25 | End: 2025-08-26

## 2025-08-25 RX ORDER — HYDROMORPHONE/SOD CHLOR,ISO/PF 2 MG/10 ML
8 SYRINGE (ML) INJECTION EVERY 4 HOURS
Refills: 0 | Status: DISCONTINUED | OUTPATIENT
Start: 2025-08-25 | End: 2025-08-29

## 2025-08-25 RX ADMIN — Medication 1 MILLIGRAM(S): at 18:27

## 2025-08-25 RX ADMIN — BUPROPION HYDROBROMIDE 300 MILLIGRAM(S): 522 TABLET, EXTENDED RELEASE ORAL at 11:42

## 2025-08-25 RX ADMIN — EZETIMIBE 10 MILLIGRAM(S): 10 TABLET ORAL at 21:37

## 2025-08-25 RX ADMIN — Medication 8 MILLIGRAM(S): at 11:59

## 2025-08-25 RX ADMIN — Medication 25 GRAM(S): at 13:39

## 2025-08-25 RX ADMIN — GABAPENTIN 100 MILLIGRAM(S): 400 CAPSULE ORAL at 21:35

## 2025-08-25 RX ADMIN — MIRABEGRON 50 MILLIGRAM(S): 50 TABLET, FILM COATED, EXTENDED RELEASE ORAL at 11:42

## 2025-08-25 RX ADMIN — Medication 1 MILLIGRAM(S): at 21:30

## 2025-08-25 RX ADMIN — Medication 40 MILLIEQUIVALENT(S): at 11:41

## 2025-08-25 RX ADMIN — Medication 8 MILLIGRAM(S): at 10:59

## 2025-08-25 RX ADMIN — Medication 50 MILLIGRAM(S): at 21:36

## 2025-08-25 RX ADMIN — Medication 20 MILLIGRAM(S): at 17:27

## 2025-08-25 RX ADMIN — Medication 1 MILLIGRAM(S): at 06:15

## 2025-08-25 RX ADMIN — GABAPENTIN 100 MILLIGRAM(S): 400 CAPSULE ORAL at 13:39

## 2025-08-25 RX ADMIN — Medication 1 MILLIGRAM(S): at 20:33

## 2025-08-25 RX ADMIN — TAMSULOSIN HYDROCHLORIDE 0.4 MILLIGRAM(S): 0.4 CAPSULE ORAL at 22:16

## 2025-08-25 RX ADMIN — Medication 1 TABLET(S): at 07:01

## 2025-08-25 RX ADMIN — Medication 1 APPLICATION(S): at 06:08

## 2025-08-25 RX ADMIN — MIRTAZAPINE 7.5 MILLIGRAM(S): 30 TABLET, FILM COATED ORAL at 21:35

## 2025-08-25 RX ADMIN — Medication 20 MILLIGRAM(S): at 06:59

## 2025-08-25 RX ADMIN — Medication 1 MILLIGRAM(S): at 07:05

## 2025-08-25 RX ADMIN — NALOXEGOL OXALATE 25 MILLIGRAM(S): 12.5 TABLET, FILM COATED ORAL at 11:42

## 2025-08-25 RX ADMIN — Medication 25 GRAM(S): at 05:58

## 2025-08-25 RX ADMIN — Medication 1 MILLIGRAM(S): at 14:21

## 2025-08-25 RX ADMIN — Medication 1 MILLIGRAM(S): at 00:04

## 2025-08-25 RX ADMIN — Medication 1 MILLIGRAM(S): at 17:27

## 2025-08-25 RX ADMIN — BICTEGRAVIR SODIUM, EMTRICITABINE, AND TENOFOVIR ALAFENAMIDE FUMARATE 1 TABLET(S): 50; 200; 25 TABLET ORAL at 11:43

## 2025-08-25 RX ADMIN — Medication 5 MILLIGRAM(S): at 22:16

## 2025-08-25 RX ADMIN — Medication 25 GRAM(S): at 21:35

## 2025-08-25 RX ADMIN — Medication 1 MILLIGRAM(S): at 15:21

## 2025-08-26 LAB
ALBUMIN SERPL ELPH-MCNC: 3.8 G/DL — SIGNIFICANT CHANGE UP (ref 3.3–5)
ALP SERPL-CCNC: 76 U/L — SIGNIFICANT CHANGE UP (ref 40–120)
ALT FLD-CCNC: <5 U/L — SIGNIFICANT CHANGE UP (ref 4–41)
ANION GAP SERPL CALC-SCNC: 12 MMOL/L — SIGNIFICANT CHANGE UP (ref 7–14)
APTT BLD: 29.1 SEC — SIGNIFICANT CHANGE UP (ref 26.1–36.8)
AST SERPL-CCNC: 9 U/L — SIGNIFICANT CHANGE UP (ref 4–40)
BASOPHILS # BLD AUTO: 0.05 K/UL — SIGNIFICANT CHANGE UP (ref 0–0.2)
BASOPHILS NFR BLD AUTO: 0.7 % — SIGNIFICANT CHANGE UP (ref 0–2)
BILIRUB SERPL-MCNC: 0.2 MG/DL — SIGNIFICANT CHANGE UP (ref 0.2–1.2)
BLD GP AB SCN SERPL QL: NEGATIVE — SIGNIFICANT CHANGE UP
BUN SERPL-MCNC: 10 MG/DL — SIGNIFICANT CHANGE UP (ref 7–23)
CALCIUM SERPL-MCNC: 9.1 MG/DL — SIGNIFICANT CHANGE UP (ref 8.4–10.5)
CHLORIDE SERPL-SCNC: 100 MMOL/L — SIGNIFICANT CHANGE UP (ref 98–107)
CO2 SERPL-SCNC: 23 MMOL/L — SIGNIFICANT CHANGE UP (ref 22–31)
CREAT SERPL-MCNC: 0.87 MG/DL — SIGNIFICANT CHANGE UP (ref 0.5–1.3)
EGFR: 99 ML/MIN/1.73M2 — SIGNIFICANT CHANGE UP
EGFR: 99 ML/MIN/1.73M2 — SIGNIFICANT CHANGE UP
EOSINOPHIL # BLD AUTO: 0.08 K/UL — SIGNIFICANT CHANGE UP (ref 0–0.5)
EOSINOPHIL NFR BLD AUTO: 1 % — SIGNIFICANT CHANGE UP (ref 0–6)
GLUCOSE SERPL-MCNC: 95 MG/DL — SIGNIFICANT CHANGE UP (ref 70–99)
HCT VFR BLD CALC: 27 % — LOW (ref 39–50)
HGB BLD-MCNC: 8.6 G/DL — LOW (ref 13–17)
IMM GRANULOCYTES # BLD AUTO: 0.03 K/UL — SIGNIFICANT CHANGE UP (ref 0–0.07)
IMM GRANULOCYTES NFR BLD AUTO: 0.4 % — SIGNIFICANT CHANGE UP (ref 0–0.9)
INR BLD: 1.23 RATIO — HIGH (ref 0.85–1.16)
LYMPHOCYTES # BLD AUTO: 1.57 K/UL — SIGNIFICANT CHANGE UP (ref 1–3.3)
LYMPHOCYTES NFR BLD AUTO: 20.6 % — SIGNIFICANT CHANGE UP (ref 13–44)
MAGNESIUM SERPL-MCNC: 2.1 MG/DL — SIGNIFICANT CHANGE UP (ref 1.6–2.6)
MCHC RBC-ENTMCNC: 27.8 PG — SIGNIFICANT CHANGE UP (ref 27–34)
MCHC RBC-ENTMCNC: 31.9 G/DL — LOW (ref 32–36)
MCV RBC AUTO: 87.4 FL — SIGNIFICANT CHANGE UP (ref 80–100)
MONOCYTES # BLD AUTO: 1.15 K/UL — HIGH (ref 0–0.9)
MONOCYTES NFR BLD AUTO: 15.1 % — HIGH (ref 2–14)
NEUTROPHILS # BLD AUTO: 4.74 K/UL — SIGNIFICANT CHANGE UP (ref 1.8–7.4)
NEUTROPHILS NFR BLD AUTO: 62.2 % — SIGNIFICANT CHANGE UP (ref 43–77)
NRBC # BLD AUTO: 0 K/UL — SIGNIFICANT CHANGE UP (ref 0–0)
NRBC # FLD: 0 K/UL — SIGNIFICANT CHANGE UP (ref 0–0)
NRBC BLD AUTO-RTO: 0 /100 WBCS — SIGNIFICANT CHANGE UP (ref 0–0)
PHOSPHATE SERPL-MCNC: 2.9 MG/DL — SIGNIFICANT CHANGE UP (ref 2.5–4.5)
PLATELET # BLD AUTO: 380 K/UL — SIGNIFICANT CHANGE UP (ref 150–400)
PMV BLD: 9.6 FL — SIGNIFICANT CHANGE UP (ref 7–13)
POTASSIUM SERPL-MCNC: 3.7 MMOL/L — SIGNIFICANT CHANGE UP (ref 3.5–5.3)
POTASSIUM SERPL-SCNC: 3.7 MMOL/L — SIGNIFICANT CHANGE UP (ref 3.5–5.3)
PROT SERPL-MCNC: 7.3 G/DL — SIGNIFICANT CHANGE UP (ref 6–8.3)
PROTHROM AB SERPL-ACNC: 14.2 SEC — HIGH (ref 9.9–13.4)
RBC # BLD: 3.09 M/UL — LOW (ref 4.2–5.8)
RBC # FLD: 14.5 % — SIGNIFICANT CHANGE UP (ref 10.3–14.5)
RH IG SCN BLD-IMP: NEGATIVE — SIGNIFICANT CHANGE UP
SODIUM SERPL-SCNC: 135 MMOL/L — SIGNIFICANT CHANGE UP (ref 135–145)
WBC # BLD: 7.62 K/UL — SIGNIFICANT CHANGE UP (ref 3.8–10.5)
WBC # FLD AUTO: 7.62 K/UL — SIGNIFICANT CHANGE UP (ref 3.8–10.5)

## 2025-08-26 PROCEDURE — 99233 SBSQ HOSP IP/OBS HIGH 50: CPT

## 2025-08-26 PROCEDURE — 99232 SBSQ HOSP IP/OBS MODERATE 35: CPT

## 2025-08-26 PROCEDURE — G0545: CPT

## 2025-08-26 PROCEDURE — 99233 SBSQ HOSP IP/OBS HIGH 50: CPT | Mod: GC

## 2025-08-26 RX ORDER — SODIUM CHLORIDE 9 G/1000ML
1000 INJECTION, SOLUTION INTRAVENOUS
Refills: 0 | Status: DISCONTINUED | OUTPATIENT
Start: 2025-08-27 | End: 2025-08-27

## 2025-08-26 RX ORDER — HYDROMORPHONE/SOD CHLOR,ISO/PF 2 MG/10 ML
2 SYRINGE (ML) INJECTION
Refills: 0 | Status: DISCONTINUED | OUTPATIENT
Start: 2025-08-26 | End: 2025-08-28

## 2025-08-26 RX ORDER — PIPERACILLIN-TAZO-DEXTROSE,ISO 3.375G/5
4.5 IV SOLUTION, PIGGYBACK PREMIX FROZEN(ML) INTRAVENOUS
Qty: 60 | Refills: 0
Start: 2025-08-26 | End: 2025-09-14

## 2025-08-26 RX ORDER — GABAPENTIN 400 MG/1
300 CAPSULE ORAL EVERY 12 HOURS
Refills: 0 | Status: DISCONTINUED | OUTPATIENT
Start: 2025-08-26 | End: 2025-08-28

## 2025-08-26 RX ADMIN — EZETIMIBE 10 MILLIGRAM(S): 10 TABLET ORAL at 21:26

## 2025-08-26 RX ADMIN — METOPROLOL SUCCINATE 100 MILLIGRAM(S): 50 TABLET, EXTENDED RELEASE ORAL at 05:07

## 2025-08-26 RX ADMIN — Medication 40 MILLIEQUIVALENT(S): at 11:36

## 2025-08-26 RX ADMIN — Medication 2 MILLIGRAM(S): at 12:28

## 2025-08-26 RX ADMIN — GABAPENTIN 100 MILLIGRAM(S): 400 CAPSULE ORAL at 05:08

## 2025-08-26 RX ADMIN — Medication 2 MILLIGRAM(S): at 16:14

## 2025-08-26 RX ADMIN — Medication 1 APPLICATION(S): at 05:10

## 2025-08-26 RX ADMIN — Medication 1 MILLIGRAM(S): at 05:09

## 2025-08-26 RX ADMIN — Medication 1 MILLIGRAM(S): at 06:00

## 2025-08-26 RX ADMIN — BICTEGRAVIR SODIUM, EMTRICITABINE, AND TENOFOVIR ALAFENAMIDE FUMARATE 1 TABLET(S): 50; 200; 25 TABLET ORAL at 11:39

## 2025-08-26 RX ADMIN — Medication 50 MILLIGRAM(S): at 21:22

## 2025-08-26 RX ADMIN — MIRABEGRON 50 MILLIGRAM(S): 50 TABLET, FILM COATED, EXTENDED RELEASE ORAL at 11:36

## 2025-08-26 RX ADMIN — Medication 20 MILLIGRAM(S): at 17:06

## 2025-08-26 RX ADMIN — Medication 25 GRAM(S): at 05:08

## 2025-08-26 RX ADMIN — Medication 25 GRAM(S): at 21:18

## 2025-08-26 RX ADMIN — Medication 2 MILLIGRAM(S): at 15:50

## 2025-08-26 RX ADMIN — MIRTAZAPINE 7.5 MILLIGRAM(S): 30 TABLET, FILM COATED ORAL at 21:22

## 2025-08-26 RX ADMIN — BUPROPION HYDROBROMIDE 300 MILLIGRAM(S): 522 TABLET, EXTENDED RELEASE ORAL at 11:36

## 2025-08-26 RX ADMIN — Medication 2 MILLIGRAM(S): at 21:18

## 2025-08-26 RX ADMIN — Medication 1 MILLIGRAM(S): at 09:00

## 2025-08-26 RX ADMIN — TAMSULOSIN HYDROCHLORIDE 0.4 MILLIGRAM(S): 0.4 CAPSULE ORAL at 21:22

## 2025-08-26 RX ADMIN — Medication 1 MILLIGRAM(S): at 08:21

## 2025-08-26 RX ADMIN — Medication 25 GRAM(S): at 13:47

## 2025-08-26 RX ADMIN — Medication 2 MILLIGRAM(S): at 11:48

## 2025-08-26 RX ADMIN — NALOXEGOL OXALATE 25 MILLIGRAM(S): 12.5 TABLET, FILM COATED ORAL at 11:36

## 2025-08-26 RX ADMIN — GABAPENTIN 300 MILLIGRAM(S): 400 CAPSULE ORAL at 17:07

## 2025-08-26 RX ADMIN — Medication 2 MILLIGRAM(S): at 22:18

## 2025-08-26 RX ADMIN — Medication 20 MILLIGRAM(S): at 05:07

## 2025-08-26 RX ADMIN — Medication 5 MILLIGRAM(S): at 23:05

## 2025-08-26 RX ADMIN — Medication 1 TABLET(S): at 05:07

## 2025-08-27 ENCOUNTER — TRANSCRIPTION ENCOUNTER (OUTPATIENT)
Age: 60
End: 2025-08-27

## 2025-08-27 ENCOUNTER — APPOINTMENT (OUTPATIENT)
Dept: VASCULAR SURGERY | Facility: HOSPITAL | Age: 60
End: 2025-08-27

## 2025-08-27 DIAGNOSIS — M79.604 PAIN IN RIGHT LEG: ICD-10-CM

## 2025-08-27 LAB
ANION GAP SERPL CALC-SCNC: 13 MMOL/L — SIGNIFICANT CHANGE UP (ref 7–14)
APTT BLD: 30 SEC — SIGNIFICANT CHANGE UP (ref 26.1–36.8)
BLD GP AB SCN SERPL QL: NEGATIVE — SIGNIFICANT CHANGE UP
BUN SERPL-MCNC: 14 MG/DL — SIGNIFICANT CHANGE UP (ref 7–23)
CALCIUM SERPL-MCNC: 9.4 MG/DL — SIGNIFICANT CHANGE UP (ref 8.4–10.5)
CHLORIDE SERPL-SCNC: 102 MMOL/L — SIGNIFICANT CHANGE UP (ref 98–107)
CO2 SERPL-SCNC: 22 MMOL/L — SIGNIFICANT CHANGE UP (ref 22–31)
CREAT SERPL-MCNC: 0.85 MG/DL — SIGNIFICANT CHANGE UP (ref 0.5–1.3)
EGFR: 100 ML/MIN/1.73M2 — SIGNIFICANT CHANGE UP
EGFR: 100 ML/MIN/1.73M2 — SIGNIFICANT CHANGE UP
GAS PNL BLDA: SIGNIFICANT CHANGE UP
GLUCOSE SERPL-MCNC: 85 MG/DL — SIGNIFICANT CHANGE UP (ref 70–99)
HCT VFR BLD CALC: 26.9 % — LOW (ref 39–50)
HGB BLD-MCNC: 8.7 G/DL — LOW (ref 13–17)
INR BLD: 1.2 RATIO — HIGH (ref 0.85–1.16)
MAGNESIUM SERPL-MCNC: 2.2 MG/DL — SIGNIFICANT CHANGE UP (ref 1.6–2.6)
MCHC RBC-ENTMCNC: 27.6 PG — SIGNIFICANT CHANGE UP (ref 27–34)
MCHC RBC-ENTMCNC: 32.3 G/DL — SIGNIFICANT CHANGE UP (ref 32–36)
MCV RBC AUTO: 85.4 FL — SIGNIFICANT CHANGE UP (ref 80–100)
NRBC # BLD AUTO: 0 K/UL — SIGNIFICANT CHANGE UP (ref 0–0)
NRBC # FLD: 0 K/UL — SIGNIFICANT CHANGE UP (ref 0–0)
NRBC BLD AUTO-RTO: 0 /100 WBCS — SIGNIFICANT CHANGE UP (ref 0–0)
PHOSPHATE SERPL-MCNC: 2.8 MG/DL — SIGNIFICANT CHANGE UP (ref 2.5–4.5)
PLATELET # BLD AUTO: 390 K/UL — SIGNIFICANT CHANGE UP (ref 150–400)
PMV BLD: 9.1 FL — SIGNIFICANT CHANGE UP (ref 7–13)
POTASSIUM SERPL-MCNC: 4 MMOL/L — SIGNIFICANT CHANGE UP (ref 3.5–5.3)
POTASSIUM SERPL-SCNC: 4 MMOL/L — SIGNIFICANT CHANGE UP (ref 3.5–5.3)
PROTHROM AB SERPL-ACNC: 13.9 SEC — HIGH (ref 9.9–13.4)
RBC # BLD: 3.15 M/UL — LOW (ref 4.2–5.8)
RBC # FLD: 14.3 % — SIGNIFICANT CHANGE UP (ref 10.3–14.5)
RH IG SCN BLD-IMP: NEGATIVE — SIGNIFICANT CHANGE UP
SODIUM SERPL-SCNC: 137 MMOL/L — SIGNIFICANT CHANGE UP (ref 135–145)
WBC # BLD: 7.51 K/UL — SIGNIFICANT CHANGE UP (ref 3.8–10.5)
WBC # FLD AUTO: 7.51 K/UL — SIGNIFICANT CHANGE UP (ref 3.8–10.5)

## 2025-08-27 PROCEDURE — 99233 SBSQ HOSP IP/OBS HIGH 50: CPT | Mod: GC

## 2025-08-27 PROCEDURE — G0545: CPT

## 2025-08-27 PROCEDURE — 99232 SBSQ HOSP IP/OBS MODERATE 35: CPT

## 2025-08-27 PROCEDURE — 99233 SBSQ HOSP IP/OBS HIGH 50: CPT

## 2025-08-27 PROCEDURE — 99223 1ST HOSP IP/OBS HIGH 75: CPT | Mod: GC

## 2025-08-27 DEVICE — ANGIO CATH GLIDECATH ANGLE TAPER 5FR X 65CM: Type: IMPLANTABLE DEVICE | Site: RIGHT | Status: FUNCTIONAL

## 2025-08-27 DEVICE — SHEATH INTRODUCER CORDIS BRITE TIP 7FR X 23CM (ORANGE): Type: IMPLANTABLE DEVICE | Site: RIGHT | Status: FUNCTIONAL

## 2025-08-27 DEVICE — GWIRE VASC ENTRY MINISTICK MAX 4FRX10CM: Type: IMPLANTABLE DEVICE | Site: RIGHT | Status: FUNCTIONAL

## 2025-08-27 DEVICE — GUIDEWIRE GLIDEWIRE TERUMO 0.035" X 180 CM, 3CM ANGLE TIP: Type: IMPLANTABLE DEVICE | Site: RIGHT | Status: FUNCTIONAL

## 2025-08-27 DEVICE — CLIP APPLIER COVIDIEN SURGICLIP 11.5" MEDIUM: Type: IMPLANTABLE DEVICE | Site: RIGHT | Status: FUNCTIONAL

## 2025-08-27 DEVICE — IMPLANTABLE DEVICE: Type: IMPLANTABLE DEVICE | Site: RIGHT | Status: FUNCTIONAL

## 2025-08-27 DEVICE — GUIDEWIRE BENTSON 0.035 X 180CM: Type: IMPLANTABLE DEVICE | Site: RIGHT | Status: FUNCTIONAL

## 2025-08-27 DEVICE — BLLN MUSTANG 12X40MMX75CM: Type: IMPLANTABLE DEVICE | Site: RIGHT | Status: FUNCTIONAL

## 2025-08-27 DEVICE — GUIDEWIRE AMPLATZ SUPER-STIFF STRAIGHT .035" X 180CM: Type: IMPLANTABLE DEVICE | Site: RIGHT | Status: FUNCTIONAL

## 2025-08-27 DEVICE — GUIDEWIRE AMPLATZ SUPER-STIFF SHORT TAPER .035" X 260CM: Type: IMPLANTABLE DEVICE | Site: RIGHT | Status: FUNCTIONAL

## 2025-08-27 DEVICE — CATH OMNI FLUSH 4FR: Type: IMPLANTABLE DEVICE | Site: RIGHT | Status: FUNCTIONAL

## 2025-08-27 DEVICE — SHEATH INTRODUCER TERUMO PINNACLE PERIPHERAL 5FR X 10CM X 0.035" MINI WIRE: Type: IMPLANTABLE DEVICE | Site: RIGHT | Status: FUNCTIONAL

## 2025-08-27 DEVICE — CLIP APPLIER COVIDIEN SURGICLIP III 9" SM: Type: IMPLANTABLE DEVICE | Site: RIGHT | Status: FUNCTIONAL

## 2025-08-27 DEVICE — SURGIFOAM 8 X 12.5CM X 10MM (100): Type: IMPLANTABLE DEVICE | Site: RIGHT | Status: FUNCTIONAL

## 2025-08-27 RX ORDER — ONDANSETRON HCL/PF 4 MG/2 ML
4 VIAL (ML) INJECTION ONCE
Refills: 0 | Status: DISCONTINUED | OUTPATIENT
Start: 2025-08-27 | End: 2025-08-27

## 2025-08-27 RX ORDER — HYDROMORPHONE/SOD CHLOR,ISO/PF 2 MG/10 ML
0.5 SYRINGE (ML) INJECTION
Refills: 0 | Status: DISCONTINUED | OUTPATIENT
Start: 2025-08-27 | End: 2025-08-27

## 2025-08-27 RX ORDER — SODIUM PHOSPHATE,DIBASIC DIHYD
15 POWDER (GRAM) MISCELLANEOUS ONCE
Refills: 0 | Status: COMPLETED | OUTPATIENT
Start: 2025-08-27 | End: 2025-08-27

## 2025-08-27 RX ORDER — ASPIRIN 325 MG
81 TABLET ORAL DAILY
Refills: 0 | Status: DISCONTINUED | OUTPATIENT
Start: 2025-08-27 | End: 2025-08-29

## 2025-08-27 RX ADMIN — Medication 1 TABLET(S): at 05:09

## 2025-08-27 RX ADMIN — Medication 0.5 MILLIGRAM(S): at 11:30

## 2025-08-27 RX ADMIN — Medication 2 MILLIGRAM(S): at 20:11

## 2025-08-27 RX ADMIN — Medication 25 GRAM(S): at 21:27

## 2025-08-27 RX ADMIN — Medication 0.5 MILLIGRAM(S): at 11:55

## 2025-08-27 RX ADMIN — Medication 2 MILLIGRAM(S): at 04:18

## 2025-08-27 RX ADMIN — Medication 1 APPLICATION(S): at 05:14

## 2025-08-27 RX ADMIN — Medication 20 MILLIGRAM(S): at 17:14

## 2025-08-27 RX ADMIN — Medication 2 MILLIGRAM(S): at 15:50

## 2025-08-27 RX ADMIN — Medication 25 GRAM(S): at 05:08

## 2025-08-27 RX ADMIN — EZETIMIBE 10 MILLIGRAM(S): 10 TABLET ORAL at 21:26

## 2025-08-27 RX ADMIN — Medication 2 MILLIGRAM(S): at 15:07

## 2025-08-27 RX ADMIN — NALOXEGOL OXALATE 25 MILLIGRAM(S): 12.5 TABLET, FILM COATED ORAL at 11:42

## 2025-08-27 RX ADMIN — METOPROLOL SUCCINATE 100 MILLIGRAM(S): 50 TABLET, EXTENDED RELEASE ORAL at 05:08

## 2025-08-27 RX ADMIN — Medication 81 MILLIGRAM(S): at 11:42

## 2025-08-27 RX ADMIN — Medication 25 GRAM(S): at 13:52

## 2025-08-27 RX ADMIN — Medication 2 MILLIGRAM(S): at 21:10

## 2025-08-27 RX ADMIN — Medication 0.5 MILLIGRAM(S): at 11:11

## 2025-08-27 RX ADMIN — Medication 63.75 MILLIMOLE(S): at 14:50

## 2025-08-27 RX ADMIN — MIRTAZAPINE 7.5 MILLIGRAM(S): 30 TABLET, FILM COATED ORAL at 21:26

## 2025-08-27 RX ADMIN — GABAPENTIN 300 MILLIGRAM(S): 400 CAPSULE ORAL at 05:14

## 2025-08-27 RX ADMIN — BICTEGRAVIR SODIUM, EMTRICITABINE, AND TENOFOVIR ALAFENAMIDE FUMARATE 1 TABLET(S): 50; 200; 25 TABLET ORAL at 11:43

## 2025-08-27 RX ADMIN — MIRABEGRON 50 MILLIGRAM(S): 50 TABLET, FILM COATED, EXTENDED RELEASE ORAL at 11:42

## 2025-08-27 RX ADMIN — SODIUM CHLORIDE 75 MILLILITER(S): 9 INJECTION, SOLUTION INTRAVENOUS at 00:10

## 2025-08-27 RX ADMIN — BUPROPION HYDROBROMIDE 300 MILLIGRAM(S): 522 TABLET, EXTENDED RELEASE ORAL at 11:42

## 2025-08-27 RX ADMIN — GABAPENTIN 300 MILLIGRAM(S): 400 CAPSULE ORAL at 17:14

## 2025-08-27 RX ADMIN — TAMSULOSIN HYDROCHLORIDE 0.4 MILLIGRAM(S): 0.4 CAPSULE ORAL at 21:25

## 2025-08-27 RX ADMIN — Medication 0.5 MILLIGRAM(S): at 11:41

## 2025-08-27 RX ADMIN — Medication 50 MILLIGRAM(S): at 21:26

## 2025-08-27 RX ADMIN — Medication 20 MILLIGRAM(S): at 05:09

## 2025-08-27 RX ADMIN — Medication 2 MILLIGRAM(S): at 05:18

## 2025-08-28 DIAGNOSIS — R53.81 OTHER MALAISE: ICD-10-CM

## 2025-08-28 LAB
ALBUMIN SERPL ELPH-MCNC: 3.5 G/DL — SIGNIFICANT CHANGE UP (ref 3.3–5)
ALP SERPL-CCNC: 71 U/L — SIGNIFICANT CHANGE UP (ref 40–120)
ALT FLD-CCNC: <5 U/L — SIGNIFICANT CHANGE UP (ref 4–41)
ANION GAP SERPL CALC-SCNC: 11 MMOL/L — SIGNIFICANT CHANGE UP (ref 7–14)
AST SERPL-CCNC: 8 U/L — SIGNIFICANT CHANGE UP (ref 4–40)
BASOPHILS # BLD AUTO: 0.03 K/UL — SIGNIFICANT CHANGE UP (ref 0–0.2)
BASOPHILS NFR BLD AUTO: 0.4 % — SIGNIFICANT CHANGE UP (ref 0–2)
BILIRUB SERPL-MCNC: <0.2 MG/DL — SIGNIFICANT CHANGE UP (ref 0.2–1.2)
BUN SERPL-MCNC: 11 MG/DL — SIGNIFICANT CHANGE UP (ref 7–23)
CALCIUM SERPL-MCNC: 8.9 MG/DL — SIGNIFICANT CHANGE UP (ref 8.4–10.5)
CHLORIDE SERPL-SCNC: 98 MMOL/L — SIGNIFICANT CHANGE UP (ref 98–107)
CO2 SERPL-SCNC: 24 MMOL/L — SIGNIFICANT CHANGE UP (ref 22–31)
CREAT SERPL-MCNC: 0.84 MG/DL — SIGNIFICANT CHANGE UP (ref 0.5–1.3)
EGFR: 100 ML/MIN/1.73M2 — SIGNIFICANT CHANGE UP
EGFR: 100 ML/MIN/1.73M2 — SIGNIFICANT CHANGE UP
EOSINOPHIL # BLD AUTO: 0.03 K/UL — SIGNIFICANT CHANGE UP (ref 0–0.5)
EOSINOPHIL NFR BLD AUTO: 0.4 % — SIGNIFICANT CHANGE UP (ref 0–6)
GLUCOSE SERPL-MCNC: 90 MG/DL — SIGNIFICANT CHANGE UP (ref 70–99)
HCT VFR BLD CALC: 25.3 % — LOW (ref 39–50)
HGB BLD-MCNC: 8 G/DL — LOW (ref 13–17)
IMM GRANULOCYTES # BLD AUTO: 0.03 K/UL — SIGNIFICANT CHANGE UP (ref 0–0.07)
IMM GRANULOCYTES NFR BLD AUTO: 0.4 % — SIGNIFICANT CHANGE UP (ref 0–0.9)
LYMPHOCYTES # BLD AUTO: 1.92 K/UL — SIGNIFICANT CHANGE UP (ref 1–3.3)
LYMPHOCYTES NFR BLD AUTO: 26.3 % — SIGNIFICANT CHANGE UP (ref 13–44)
MAGNESIUM SERPL-MCNC: 2 MG/DL — SIGNIFICANT CHANGE UP (ref 1.6–2.6)
MCHC RBC-ENTMCNC: 27.4 PG — SIGNIFICANT CHANGE UP (ref 27–34)
MCHC RBC-ENTMCNC: 31.6 G/DL — LOW (ref 32–36)
MCV RBC AUTO: 86.6 FL — SIGNIFICANT CHANGE UP (ref 80–100)
MONOCYTES # BLD AUTO: 1.26 K/UL — HIGH (ref 0–0.9)
MONOCYTES NFR BLD AUTO: 17.3 % — HIGH (ref 2–14)
NEUTROPHILS # BLD AUTO: 4.03 K/UL — SIGNIFICANT CHANGE UP (ref 1.8–7.4)
NEUTROPHILS NFR BLD AUTO: 55.2 % — SIGNIFICANT CHANGE UP (ref 43–77)
NRBC # BLD AUTO: 0 K/UL — SIGNIFICANT CHANGE UP (ref 0–0)
NRBC # FLD: 0 K/UL — SIGNIFICANT CHANGE UP (ref 0–0)
NRBC BLD AUTO-RTO: 0 /100 WBCS — SIGNIFICANT CHANGE UP (ref 0–0)
PHOSPHATE SERPL-MCNC: 2.6 MG/DL — SIGNIFICANT CHANGE UP (ref 2.5–4.5)
PLATELET # BLD AUTO: 410 K/UL — HIGH (ref 150–400)
PMV BLD: 9.5 FL — SIGNIFICANT CHANGE UP (ref 7–13)
POTASSIUM SERPL-MCNC: 3.7 MMOL/L — SIGNIFICANT CHANGE UP (ref 3.5–5.3)
POTASSIUM SERPL-SCNC: 3.7 MMOL/L — SIGNIFICANT CHANGE UP (ref 3.5–5.3)
PROT SERPL-MCNC: 7.2 G/DL — SIGNIFICANT CHANGE UP (ref 6–8.3)
RBC # BLD: 2.92 M/UL — LOW (ref 4.2–5.8)
RBC # FLD: 14.5 % — SIGNIFICANT CHANGE UP (ref 10.3–14.5)
SODIUM SERPL-SCNC: 133 MMOL/L — LOW (ref 135–145)
WBC # BLD: 7.3 K/UL — SIGNIFICANT CHANGE UP (ref 3.8–10.5)
WBC # FLD AUTO: 7.3 K/UL — SIGNIFICANT CHANGE UP (ref 3.8–10.5)

## 2025-08-28 PROCEDURE — 99232 SBSQ HOSP IP/OBS MODERATE 35: CPT

## 2025-08-28 PROCEDURE — 99233 SBSQ HOSP IP/OBS HIGH 50: CPT

## 2025-08-28 PROCEDURE — G0545: CPT

## 2025-08-28 PROCEDURE — 99233 SBSQ HOSP IP/OBS HIGH 50: CPT | Mod: GC

## 2025-08-28 PROCEDURE — 70553 MRI BRAIN STEM W/O & W/DYE: CPT | Mod: 26

## 2025-08-28 PROCEDURE — 72158 MRI LUMBAR SPINE W/O & W/DYE: CPT | Mod: 26

## 2025-08-28 RX ORDER — HYDROMORPHONE/SOD CHLOR,ISO/PF 2 MG/10 ML
3 SYRINGE (ML) INJECTION
Refills: 0 | Status: DISCONTINUED | OUTPATIENT
Start: 2025-08-28 | End: 2025-08-29

## 2025-08-28 RX ORDER — HYDROMORPHONE/SOD CHLOR,ISO/PF 2 MG/10 ML
0.5 SYRINGE (ML) INJECTION ONCE
Refills: 0 | Status: DISCONTINUED | OUTPATIENT
Start: 2025-08-28 | End: 2025-08-28

## 2025-08-28 RX ORDER — GABAPENTIN 400 MG/1
300 CAPSULE ORAL EVERY 8 HOURS
Refills: 0 | Status: DISCONTINUED | OUTPATIENT
Start: 2025-08-28 | End: 2025-08-29

## 2025-08-28 RX ORDER — ENOXAPARIN SODIUM 100 MG/ML
40 INJECTION SUBCUTANEOUS EVERY 24 HOURS
Refills: 0 | Status: DISCONTINUED | OUTPATIENT
Start: 2025-08-28 | End: 2025-08-29

## 2025-08-28 RX ORDER — SODIUM CHLORIDE 9 G/1000ML
1000 INJECTION, SOLUTION INTRAVENOUS
Refills: 0 | Status: DISCONTINUED | OUTPATIENT
Start: 2025-08-28 | End: 2025-08-28

## 2025-08-28 RX ADMIN — MIRTAZAPINE 7.5 MILLIGRAM(S): 30 TABLET, FILM COATED ORAL at 21:31

## 2025-08-28 RX ADMIN — MIRABEGRON 50 MILLIGRAM(S): 50 TABLET, FILM COATED, EXTENDED RELEASE ORAL at 12:11

## 2025-08-28 RX ADMIN — Medication 3 MILLIGRAM(S): at 18:22

## 2025-08-28 RX ADMIN — BICTEGRAVIR SODIUM, EMTRICITABINE, AND TENOFOVIR ALAFENAMIDE FUMARATE 1 TABLET(S): 50; 200; 25 TABLET ORAL at 13:01

## 2025-08-28 RX ADMIN — GABAPENTIN 300 MILLIGRAM(S): 400 CAPSULE ORAL at 14:18

## 2025-08-28 RX ADMIN — Medication 25 GRAM(S): at 21:29

## 2025-08-28 RX ADMIN — Medication 25 GRAM(S): at 14:18

## 2025-08-28 RX ADMIN — Medication 25 GRAM(S): at 05:24

## 2025-08-28 RX ADMIN — Medication 3 MILLIGRAM(S): at 14:45

## 2025-08-28 RX ADMIN — Medication 2 MILLIGRAM(S): at 06:10

## 2025-08-28 RX ADMIN — Medication 81 MILLIGRAM(S): at 12:11

## 2025-08-28 RX ADMIN — BUPROPION HYDROBROMIDE 300 MILLIGRAM(S): 522 TABLET, EXTENDED RELEASE ORAL at 12:11

## 2025-08-28 RX ADMIN — EZETIMIBE 10 MILLIGRAM(S): 10 TABLET ORAL at 21:35

## 2025-08-28 RX ADMIN — Medication 1 TABLET(S): at 05:21

## 2025-08-28 RX ADMIN — Medication 2 MILLIGRAM(S): at 00:32

## 2025-08-28 RX ADMIN — Medication 1 APPLICATION(S): at 05:21

## 2025-08-28 RX ADMIN — Medication 50 MILLIGRAM(S): at 21:35

## 2025-08-28 RX ADMIN — METOPROLOL SUCCINATE 100 MILLIGRAM(S): 50 TABLET, EXTENDED RELEASE ORAL at 05:21

## 2025-08-28 RX ADMIN — Medication 3 MILLIGRAM(S): at 19:22

## 2025-08-28 RX ADMIN — Medication 2 MILLIGRAM(S): at 01:32

## 2025-08-28 RX ADMIN — TAMSULOSIN HYDROCHLORIDE 0.4 MILLIGRAM(S): 0.4 CAPSULE ORAL at 21:31

## 2025-08-28 RX ADMIN — Medication 3 MILLIGRAM(S): at 21:30

## 2025-08-28 RX ADMIN — Medication 3 MILLIGRAM(S): at 22:30

## 2025-08-28 RX ADMIN — NALOXEGOL OXALATE 25 MILLIGRAM(S): 12.5 TABLET, FILM COATED ORAL at 12:12

## 2025-08-28 RX ADMIN — GABAPENTIN 300 MILLIGRAM(S): 400 CAPSULE ORAL at 05:21

## 2025-08-28 RX ADMIN — Medication 0.5 MILLIGRAM(S): at 02:03

## 2025-08-28 RX ADMIN — Medication 2 MILLIGRAM(S): at 05:10

## 2025-08-28 RX ADMIN — Medication 2 MILLIGRAM(S): at 10:43

## 2025-08-28 RX ADMIN — GABAPENTIN 300 MILLIGRAM(S): 400 CAPSULE ORAL at 21:32

## 2025-08-28 RX ADMIN — Medication 20 MILLIGRAM(S): at 17:22

## 2025-08-28 RX ADMIN — Medication 0.5 MILLIGRAM(S): at 03:03

## 2025-08-28 RX ADMIN — Medication 2 MILLIGRAM(S): at 11:43

## 2025-08-28 RX ADMIN — Medication 5 MILLIGRAM(S): at 00:41

## 2025-08-28 RX ADMIN — Medication 20 MILLIGRAM(S): at 05:20

## 2025-08-29 ENCOUNTER — TRANSCRIPTION ENCOUNTER (OUTPATIENT)
Age: 60
End: 2025-08-29

## 2025-08-29 VITALS
HEART RATE: 99 BPM | TEMPERATURE: 98 F | SYSTOLIC BLOOD PRESSURE: 119 MMHG | OXYGEN SATURATION: 96 % | RESPIRATION RATE: 18 BRPM | DIASTOLIC BLOOD PRESSURE: 81 MMHG

## 2025-08-29 LAB
ALBUMIN SERPL ELPH-MCNC: 3.5 G/DL — SIGNIFICANT CHANGE UP (ref 3.3–5)
ALP SERPL-CCNC: 68 U/L — SIGNIFICANT CHANGE UP (ref 40–120)
ALT FLD-CCNC: <5 U/L — SIGNIFICANT CHANGE UP (ref 4–41)
ANION GAP SERPL CALC-SCNC: 14 MMOL/L — SIGNIFICANT CHANGE UP (ref 7–14)
APTT BLD: 31.8 SEC — SIGNIFICANT CHANGE UP (ref 26.1–36.8)
AST SERPL-CCNC: 13 U/L — SIGNIFICANT CHANGE UP (ref 4–40)
BASOPHILS # BLD AUTO: 0.04 K/UL — SIGNIFICANT CHANGE UP (ref 0–0.2)
BASOPHILS NFR BLD AUTO: 0.6 % — SIGNIFICANT CHANGE UP (ref 0–2)
BILIRUB SERPL-MCNC: <0.2 MG/DL — SIGNIFICANT CHANGE UP (ref 0.2–1.2)
BUN SERPL-MCNC: 12 MG/DL — SIGNIFICANT CHANGE UP (ref 7–23)
CALCIUM SERPL-MCNC: 9.1 MG/DL — SIGNIFICANT CHANGE UP (ref 8.4–10.5)
CHLORIDE SERPL-SCNC: 101 MMOL/L — SIGNIFICANT CHANGE UP (ref 98–107)
CO2 SERPL-SCNC: 22 MMOL/L — SIGNIFICANT CHANGE UP (ref 22–31)
CREAT SERPL-MCNC: 0.86 MG/DL — SIGNIFICANT CHANGE UP (ref 0.5–1.3)
EGFR: 100 ML/MIN/1.73M2 — SIGNIFICANT CHANGE UP
EGFR: 100 ML/MIN/1.73M2 — SIGNIFICANT CHANGE UP
EOSINOPHIL # BLD AUTO: 0.1 K/UL — SIGNIFICANT CHANGE UP (ref 0–0.5)
EOSINOPHIL NFR BLD AUTO: 1.4 % — SIGNIFICANT CHANGE UP (ref 0–6)
GLUCOSE SERPL-MCNC: 84 MG/DL — SIGNIFICANT CHANGE UP (ref 70–99)
HCT VFR BLD CALC: 25.3 % — LOW (ref 39–50)
HGB BLD-MCNC: 8 G/DL — LOW (ref 13–17)
IMM GRANULOCYTES # BLD AUTO: 0.04 K/UL — SIGNIFICANT CHANGE UP (ref 0–0.07)
IMM GRANULOCYTES NFR BLD AUTO: 0.6 % — SIGNIFICANT CHANGE UP (ref 0–0.9)
INR BLD: 1.27 RATIO — HIGH (ref 0.85–1.16)
LYMPHOCYTES # BLD AUTO: 1.95 K/UL — SIGNIFICANT CHANGE UP (ref 1–3.3)
LYMPHOCYTES NFR BLD AUTO: 27 % — SIGNIFICANT CHANGE UP (ref 13–44)
MAGNESIUM SERPL-MCNC: 2.1 MG/DL — SIGNIFICANT CHANGE UP (ref 1.6–2.6)
MCHC RBC-ENTMCNC: 27.1 PG — SIGNIFICANT CHANGE UP (ref 27–34)
MCHC RBC-ENTMCNC: 31.6 G/DL — LOW (ref 32–36)
MCV RBC AUTO: 85.8 FL — SIGNIFICANT CHANGE UP (ref 80–100)
MONOCYTES # BLD AUTO: 1.32 K/UL — HIGH (ref 0–0.9)
MONOCYTES NFR BLD AUTO: 18.3 % — HIGH (ref 2–14)
NEUTROPHILS # BLD AUTO: 3.78 K/UL — SIGNIFICANT CHANGE UP (ref 1.8–7.4)
NEUTROPHILS NFR BLD AUTO: 52.1 % — SIGNIFICANT CHANGE UP (ref 43–77)
NRBC # BLD AUTO: 0 K/UL — SIGNIFICANT CHANGE UP (ref 0–0)
NRBC # FLD: 0 K/UL — SIGNIFICANT CHANGE UP (ref 0–0)
NRBC BLD AUTO-RTO: 0 /100 WBCS — SIGNIFICANT CHANGE UP (ref 0–0)
PHOSPHATE SERPL-MCNC: 2.6 MG/DL — SIGNIFICANT CHANGE UP (ref 2.5–4.5)
PLATELET # BLD AUTO: 420 K/UL — HIGH (ref 150–400)
PMV BLD: 9.4 FL — SIGNIFICANT CHANGE UP (ref 7–13)
POTASSIUM SERPL-MCNC: 3.9 MMOL/L — SIGNIFICANT CHANGE UP (ref 3.5–5.3)
POTASSIUM SERPL-SCNC: 3.9 MMOL/L — SIGNIFICANT CHANGE UP (ref 3.5–5.3)
PROT SERPL-MCNC: 7.2 G/DL — SIGNIFICANT CHANGE UP (ref 6–8.3)
PROTHROM AB SERPL-ACNC: 14.7 SEC — HIGH (ref 9.9–13.4)
RBC # BLD: 2.95 M/UL — LOW (ref 4.2–5.8)
RBC # FLD: 14.5 % — SIGNIFICANT CHANGE UP (ref 10.3–14.5)
SODIUM SERPL-SCNC: 137 MMOL/L — SIGNIFICANT CHANGE UP (ref 135–145)
WBC # BLD: 7.23 K/UL — SIGNIFICANT CHANGE UP (ref 3.8–10.5)
WBC # FLD AUTO: 7.23 K/UL — SIGNIFICANT CHANGE UP (ref 3.8–10.5)

## 2025-08-29 PROCEDURE — 99239 HOSP IP/OBS DSCHRG MGMT >30: CPT | Mod: GC

## 2025-08-29 PROCEDURE — 71045 X-RAY EXAM CHEST 1 VIEW: CPT | Mod: 26

## 2025-08-29 PROCEDURE — G0545: CPT

## 2025-08-29 PROCEDURE — 99232 SBSQ HOSP IP/OBS MODERATE 35: CPT

## 2025-08-29 PROCEDURE — 99233 SBSQ HOSP IP/OBS HIGH 50: CPT

## 2025-08-29 RX ORDER — GABAPENTIN 400 MG/1
1 CAPSULE ORAL
Qty: 90 | Refills: 0
Start: 2025-08-29 | End: 2025-09-27

## 2025-08-29 RX ORDER — NALOXONE HYDROCHLORIDE 0.4 MG/ML
1 INJECTION, SOLUTION INTRAMUSCULAR; INTRAVENOUS; SUBCUTANEOUS
Qty: 1 | Refills: 0
Start: 2025-08-29 | End: 2025-09-27

## 2025-08-29 RX ORDER — NALOXEGOL OXALATE 12.5 MG/1
1 TABLET, FILM COATED ORAL
Qty: 30 | Refills: 0
Start: 2025-08-29 | End: 2025-09-27

## 2025-08-29 RX ORDER — HYDROMORPHONE/SOD CHLOR,ISO/PF 2 MG/10 ML
1 SYRINGE (ML) INJECTION
Refills: 0 | DISCHARGE

## 2025-08-29 RX ORDER — HYDROMORPHONE/SOD CHLOR,ISO/PF 2 MG/10 ML
1 SYRINGE (ML) INJECTION
Qty: 84 | Refills: 0
Start: 2025-08-29 | End: 2025-09-11

## 2025-08-29 RX ORDER — ASPIRIN 325 MG
1 TABLET ORAL
Qty: 30 | Refills: 0
Start: 2025-08-29 | End: 2025-09-27

## 2025-08-29 RX ADMIN — Medication 3 MILLIGRAM(S): at 08:51

## 2025-08-29 RX ADMIN — Medication 5 MILLIGRAM(S): at 01:37

## 2025-08-29 RX ADMIN — Medication 3 MILLIGRAM(S): at 06:21

## 2025-08-29 RX ADMIN — Medication 3 MILLIGRAM(S): at 01:37

## 2025-08-29 RX ADMIN — METOPROLOL SUCCINATE 100 MILLIGRAM(S): 50 TABLET, EXTENDED RELEASE ORAL at 05:21

## 2025-08-29 RX ADMIN — Medication 1 TABLET(S): at 05:22

## 2025-08-29 RX ADMIN — BICTEGRAVIR SODIUM, EMTRICITABINE, AND TENOFOVIR ALAFENAMIDE FUMARATE 1 TABLET(S): 50; 200; 25 TABLET ORAL at 11:47

## 2025-08-29 RX ADMIN — Medication 81 MILLIGRAM(S): at 11:48

## 2025-08-29 RX ADMIN — Medication 3 MILLIGRAM(S): at 09:51

## 2025-08-29 RX ADMIN — MIRABEGRON 50 MILLIGRAM(S): 50 TABLET, FILM COATED, EXTENDED RELEASE ORAL at 11:48

## 2025-08-29 RX ADMIN — ENOXAPARIN SODIUM 40 MILLIGRAM(S): 100 INJECTION SUBCUTANEOUS at 05:19

## 2025-08-29 RX ADMIN — Medication 3 MILLIGRAM(S): at 02:37

## 2025-08-29 RX ADMIN — GABAPENTIN 300 MILLIGRAM(S): 400 CAPSULE ORAL at 05:21

## 2025-08-29 RX ADMIN — Medication 3 MILLIGRAM(S): at 05:21

## 2025-08-29 RX ADMIN — Medication 20 MILLIGRAM(S): at 05:20

## 2025-08-29 RX ADMIN — BUPROPION HYDROBROMIDE 300 MILLIGRAM(S): 522 TABLET, EXTENDED RELEASE ORAL at 11:47

## 2025-08-29 RX ADMIN — Medication 1 APPLICATION(S): at 05:29

## 2025-08-29 RX ADMIN — POLYETHYLENE GLYCOL 3350 17 GRAM(S): 17 POWDER, FOR SOLUTION ORAL at 11:51

## 2025-08-29 RX ADMIN — NALOXEGOL OXALATE 25 MILLIGRAM(S): 12.5 TABLET, FILM COATED ORAL at 11:48

## 2025-08-29 RX ADMIN — Medication 25 GRAM(S): at 05:22

## 2025-09-02 ENCOUNTER — NON-APPOINTMENT (OUTPATIENT)
Age: 60
End: 2025-09-02

## 2025-09-03 ENCOUNTER — APPOINTMENT (OUTPATIENT)
Dept: PAIN MANAGEMENT | Facility: CLINIC | Age: 60
End: 2025-09-03
Payer: MEDICAID

## 2025-09-03 PROCEDURE — 99205 OFFICE O/P NEW HI 60 MIN: CPT

## 2025-09-04 ENCOUNTER — APPOINTMENT (OUTPATIENT)
Dept: UROLOGY | Facility: CLINIC | Age: 60
End: 2025-09-04
Payer: MEDICAID

## 2025-09-04 VITALS
TEMPERATURE: 97.2 F | BODY MASS INDEX: 25.11 KG/M2 | OXYGEN SATURATION: 97 % | SYSTOLIC BLOOD PRESSURE: 121 MMHG | HEART RATE: 91 BPM | DIASTOLIC BLOOD PRESSURE: 81 MMHG | WEIGHT: 180 LBS

## 2025-09-04 DIAGNOSIS — N13.30 UNSPECIFIED HYDRONEPHROSIS: ICD-10-CM

## 2025-09-04 DIAGNOSIS — N32.81 OVERACTIVE BLADDER: ICD-10-CM

## 2025-09-04 PROCEDURE — 99214 OFFICE O/P EST MOD 30 MIN: CPT

## 2025-09-04 PROCEDURE — G2211 COMPLEX E/M VISIT ADD ON: CPT | Mod: NC

## 2025-09-04 RX ORDER — MIRABEGRON 25 MG/1
25 TABLET, EXTENDED RELEASE ORAL
Qty: 90 | Refills: 1 | Status: ACTIVE | COMMUNITY
Start: 2025-09-04 | End: 1900-01-01

## 2025-09-08 ENCOUNTER — APPOINTMENT (OUTPATIENT)
Dept: NEUROLOGY | Facility: CLINIC | Age: 60
End: 2025-09-08
Payer: MEDICAID

## 2025-09-08 VITALS
SYSTOLIC BLOOD PRESSURE: 110 MMHG | BODY MASS INDEX: 26.6 KG/M2 | HEART RATE: 80 BPM | DIASTOLIC BLOOD PRESSURE: 72 MMHG | HEIGHT: 71 IN | WEIGHT: 190 LBS

## 2025-09-08 DIAGNOSIS — G54.1 LUMBOSACRAL PLEXUS DISORDERS: ICD-10-CM

## 2025-09-08 PROCEDURE — 99205 OFFICE O/P NEW HI 60 MIN: CPT

## 2025-09-09 ENCOUNTER — APPOINTMENT (OUTPATIENT)
Dept: GERIATRICS | Facility: CLINIC | Age: 60
End: 2025-09-09
Payer: MEDICAID

## 2025-09-09 ENCOUNTER — TRANSCRIPTION ENCOUNTER (OUTPATIENT)
Age: 60
End: 2025-09-09

## 2025-09-09 DIAGNOSIS — F41.9 ANXIETY DISORDER, UNSPECIFIED: ICD-10-CM

## 2025-09-09 DIAGNOSIS — Z51.5 ENCOUNTER FOR PALLIATIVE CARE: ICD-10-CM

## 2025-09-09 DIAGNOSIS — C61 MALIGNANT NEOPLASM OF PROSTATE: ICD-10-CM

## 2025-09-09 DIAGNOSIS — M79.2 NEURALGIA AND NEURITIS, UNSPECIFIED: ICD-10-CM

## 2025-09-09 DIAGNOSIS — C21.0 MALIGNANT NEOPLASM OF ANUS, UNSPECIFIED: ICD-10-CM

## 2025-09-09 PROCEDURE — 99215 OFFICE O/P EST HI 40 MIN: CPT | Mod: 95

## 2025-09-09 RX ORDER — METHADONE HYDROCHLORIDE 5 MG/1
5 TABLET ORAL TWICE DAILY
Qty: 30 | Refills: 0 | Status: ACTIVE | COMMUNITY
Start: 2025-09-09 | End: 1900-01-01

## 2025-09-10 ENCOUNTER — APPOINTMENT (OUTPATIENT)
Dept: GASTROENTEROLOGY | Facility: CLINIC | Age: 60
End: 2025-09-10
Payer: MEDICAID

## 2025-09-10 VITALS
BODY MASS INDEX: 26.6 KG/M2 | DIASTOLIC BLOOD PRESSURE: 79 MMHG | WEIGHT: 190 LBS | HEART RATE: 78 BPM | TEMPERATURE: 97.4 F | HEIGHT: 71 IN | OXYGEN SATURATION: 98 % | SYSTOLIC BLOOD PRESSURE: 111 MMHG

## 2025-09-10 DIAGNOSIS — R10.9 UNSPECIFIED ABDOMINAL PAIN: ICD-10-CM

## 2025-09-10 DIAGNOSIS — T40.2X5A DRUG INDUCED CONSTIPATION: ICD-10-CM

## 2025-09-10 DIAGNOSIS — K59.03 DRUG INDUCED CONSTIPATION: ICD-10-CM

## 2025-09-10 DIAGNOSIS — K59.09 OTHER CONSTIPATION: ICD-10-CM

## 2025-09-10 DIAGNOSIS — K62.5 HEMORRHAGE OF ANUS AND RECTUM: ICD-10-CM

## 2025-09-10 DIAGNOSIS — R10.13 EPIGASTRIC PAIN: ICD-10-CM

## 2025-09-10 PROCEDURE — G2211 COMPLEX E/M VISIT ADD ON: CPT | Mod: NC

## 2025-09-10 PROCEDURE — 99214 OFFICE O/P EST MOD 30 MIN: CPT

## 2025-09-11 ENCOUNTER — APPOINTMENT (OUTPATIENT)
Dept: VASCULAR SURGERY | Facility: CLINIC | Age: 60
End: 2025-09-11

## 2025-09-12 ENCOUNTER — APPOINTMENT (OUTPATIENT)
Dept: VASCULAR SURGERY | Facility: CLINIC | Age: 60
End: 2025-09-12
Payer: MEDICAID

## 2025-09-12 ENCOUNTER — APPOINTMENT (OUTPATIENT)
Dept: VASCULAR SURGERY | Facility: CLINIC | Age: 60
End: 2025-09-12

## 2025-09-12 VITALS
WEIGHT: 190 LBS | HEART RATE: 69 BPM | BODY MASS INDEX: 26.6 KG/M2 | HEIGHT: 71 IN | SYSTOLIC BLOOD PRESSURE: 133 MMHG | DIASTOLIC BLOOD PRESSURE: 89 MMHG | TEMPERATURE: 98.1 F

## 2025-09-12 PROCEDURE — 93971 EXTREMITY STUDY: CPT

## 2025-09-12 PROCEDURE — 93926 LOWER EXTREMITY STUDY: CPT

## 2025-09-12 PROCEDURE — 99024 POSTOP FOLLOW-UP VISIT: CPT

## 2025-09-12 RX ORDER — HYDROMORPHONE HYDROCHLORIDE 8 MG/1
TABLET ORAL
Refills: 0 | Status: ACTIVE | COMMUNITY

## 2025-09-15 ENCOUNTER — RESULT REVIEW (OUTPATIENT)
Age: 60
End: 2025-09-15

## 2025-09-15 ENCOUNTER — APPOINTMENT (OUTPATIENT)
Dept: INTERVENTIONAL RADIOLOGY/VASCULAR | Facility: HOSPITAL | Age: 60
End: 2025-09-15
Payer: MEDICAID

## 2025-09-15 ENCOUNTER — TRANSCRIPTION ENCOUNTER (OUTPATIENT)
Age: 60
End: 2025-09-15

## 2025-09-15 ENCOUNTER — NON-APPOINTMENT (OUTPATIENT)
Age: 60
End: 2025-09-15

## 2025-09-15 PROCEDURE — 50431 NJX PX NFROSGRM &/URTRGRM: CPT | Mod: 59,LT

## 2025-09-15 PROCEDURE — 50432 PLMT NEPHROSTOMY CATHETER: CPT | Mod: LT

## 2025-09-16 ENCOUNTER — RX RENEWAL (OUTPATIENT)
Age: 60
End: 2025-09-16

## 2025-09-17 ENCOUNTER — APPOINTMENT (OUTPATIENT)
Dept: MRI IMAGING | Facility: CLINIC | Age: 60
End: 2025-09-17

## 2025-09-18 ENCOUNTER — APPOINTMENT (OUTPATIENT)
Dept: INFECTIOUS DISEASE | Facility: CLINIC | Age: 60
End: 2025-09-18
Payer: MEDICAID

## 2025-09-18 PROCEDURE — 99213 OFFICE O/P EST LOW 20 MIN: CPT | Mod: 95

## (undated) DEVICE — SOL IRR POUR H2O 500ML

## (undated) DEVICE — DRSG KLING 4"

## (undated) DEVICE — KIT ENDO PROCEDURE CUST W/VLV

## (undated) DEVICE — POSITIONER FOAM EGG CRATE ULNAR 2PCS (PINK)

## (undated) DEVICE — GEL ULTRASOUND 0.25L

## (undated) DEVICE — XI VESSEL SEALER

## (undated) DEVICE — VENODYNE/SCD SLEEVE FOOT

## (undated) DEVICE — WARMING BLANKET FULL ADULT

## (undated) DEVICE — BASIN SET DOUBLE

## (undated) DEVICE — SUT NYLON 8-0 5" DRM6

## (undated) DEVICE — SUT POLYSORB 3-0 30" V-20 UNDYED

## (undated) DEVICE — GLV 7.5 PROTEXIS (WHITE)

## (undated) DEVICE — BLADE SCALPEL SAFETYLOCK #15

## (undated) DEVICE — BLADE SCALPEL SAFETYLOCK #11

## (undated) DEVICE — MARKING PEN W RULER

## (undated) DEVICE — PACK CYSTO

## (undated) DEVICE — PREP SCRUB BRUSH W CHG 4%

## (undated) DEVICE — DRAPE MAGNETIC INSTRUMENT MEDIUM

## (undated) DEVICE — TUBING SUCTION NONCONDUCTIVE 6MM X 12FT

## (undated) DEVICE — BIPOLAR FORCEP CORD WECK STANDARD 12FT

## (undated) DEVICE — Device

## (undated) DEVICE — TOURNIQUET CUFF 34" DUAL PORT W PLC

## (undated) DEVICE — SOL IRR POUR H2O 250ML

## (undated) DEVICE — DURABLE MEDICAL EQUIPMENT: Type: DURABLE MEDICAL EQUIPMENT

## (undated) DEVICE — POOLE SUCTION TIP

## (undated) DEVICE — BLADE SCALPEL SAFETYLOCK #10

## (undated) DEVICE — PACK PERI GYN

## (undated) DEVICE — SUT SOFSILK 0 30" V-20

## (undated) DEVICE — DRAPE WARMING SOLUTION 44 X 44"

## (undated) DEVICE — SUT VICRYL 0 27" UR-6

## (undated) DEVICE — POSITIONER FOAM ABDUCTION PILLOW MED (PINK)

## (undated) DEVICE — POSITIONER STRAP ARMBOARD VELCRO TS-30

## (undated) DEVICE — INSUFFLATION NDL COVIDIEN SURGINEEDLE VERESS 120MM

## (undated) DEVICE — SUCTION YANKAUER NO CONTROL VENT

## (undated) DEVICE — TOURNIQUET ESMARK 6"

## (undated) DEVICE — DRAPE SPLIT SHEET 77" X 120"

## (undated) DEVICE — DRSG DERMABOND PRINEO 60CM

## (undated) DEVICE — WARMING BLANKET UPPER ADULT

## (undated) DEVICE — DRAPE FLUID WARMER 44 X 44"

## (undated) DEVICE — TROCAR COVIDIEN BLUNT TIP HASSAN 10MM

## (undated) DEVICE — XI SCISSOR TIP COVER

## (undated) DEVICE — TOURNIQUET CUFF 24" DUAL PORT SINGLE BLADDER LUER LOCK  (BLACK)

## (undated) DEVICE — DRAPE 3/4 SHEET 52X76"

## (undated) DEVICE — SOL IRR POUR NS 0.9% 500ML

## (undated) DEVICE — CANISTER DISPOSABLE THIN WALL 3000CC

## (undated) DEVICE — DRAIN RESERVOIR FOR JACKSON PRATT 100CC CARDINAL

## (undated) DEVICE — XI ARM NEEDLE DRIVER SUTURECUT MEGA 8MM

## (undated) DEVICE — SUT VICRYL 3-0 27" SH UNDYED

## (undated) DEVICE — PREP BETADINE KIT

## (undated) DEVICE — TOURNIQUET ESMARK 4"

## (undated) DEVICE — WARMING BLANKET LOWER ADULT

## (undated) DEVICE — PROTECTOR HEEL / ELBOW FLUFFY

## (undated) DEVICE — SUT SOFSILK 3-0 30" TIES

## (undated) DEVICE — DRAPE LAPAROTOMY TRANSVERSE

## (undated) DEVICE — PREP BETADINE SPONGE STICKS

## (undated) DEVICE — SYR LUER LOK 10CC

## (undated) DEVICE — BLADE SURGICAL #10 CARBON

## (undated) DEVICE — DRAPE COVER SNAP 36X30"

## (undated) DEVICE — PACK GENERAL LAPAROSCOPY

## (undated) DEVICE — SUT SOFSILK 4-0 18" TIES

## (undated) DEVICE — BITE BLOCK ADULT 20 X 27MM (GREEN)

## (undated) DEVICE — TUBING INSUFFLATION LAP FILTER 10FT

## (undated) DEVICE — ADAPTER CHECK FLO 9FR STERILE

## (undated) DEVICE — LIGASURE MARYLAND 37CM

## (undated) DEVICE — PACK MINOR WITH LAP

## (undated) DEVICE — DRAPE THYROID 77" X 123"

## (undated) DEVICE — BIOPSY FORCEP COLD DISP

## (undated) DEVICE — BLADE SURGICAL #15 CARBON

## (undated) DEVICE — TUBING STRYKEFLOW II SUCTION / IRRIGATOR

## (undated) DEVICE — GOWN TRIMAX LG

## (undated) DEVICE — SUT MONOCRYL 3-0 27" PS-2 UNDYED

## (undated) DEVICE — TROCAR COVIDIEN VERSAONE BLADED FIXATION 12MM STANDARD

## (undated) DEVICE — DRAPE TOWEL BLUE 17" X 24"

## (undated) DEVICE — SUT VICRYL 2-0 27" SH UNDYED

## (undated) DEVICE — SUT MAXON 1 36" GS-24

## (undated) DEVICE — LABELS BLANK W PEN

## (undated) DEVICE — TUBING OLYMPUS INSUFFLATION

## (undated) DEVICE — TUBING HYDRO-SURG PLUS IRRIGATOR W SMOKEVAC & PROBE

## (undated) DEVICE — TUBING LEVEL ONE NORMOFLO SET

## (undated) DEVICE — GOWN LG

## (undated) DEVICE — SHEARS HARMONIC ACE 5MM X 36CM CURVED TIP

## (undated) DEVICE — TROCAR COVIDIEN VERSAPORT BLADELESS OPTICAL 5MM STANDARD

## (undated) DEVICE — DOPPLER PROBE  CABLE

## (undated) DEVICE — NDL HYPO SAFE 25G X 5/8" (ORANGE)

## (undated) DEVICE — NDL COUNTER FOAM AND MAGNET 20-40

## (undated) DEVICE — POSITIONER PURPLE ARM ONE STEP (LARGE)

## (undated) DEVICE — IRR BULB PATHFINDER + 10"

## (undated) DEVICE — SPEAR SURG EYE WECK-CELL CELOS

## (undated) DEVICE — DRAPE INSTRUMENT POUCH 6.75" X 11"

## (undated) DEVICE — ELCTR BOVIE TIP BLADE INSULATED 2.75" EDGE

## (undated) DEVICE — TUBING HYBRID CO2

## (undated) DEVICE — FOLEY CATH 3-WAY 22FR 30CC LATEX LUBRICATH

## (undated) DEVICE — DRSG WEBRIL 6"

## (undated) DEVICE — FORCEP RADIAL JAW 4 W NDL 2.4MM 2.8MM 240CM ORANGE DISP

## (undated) DEVICE — SUT PROLENE 5-0 36" RB-1

## (undated) DEVICE — VENODYNE/SCD SLEEVE CALF MEDIUM

## (undated) DEVICE — SOL IRR POUR NS 0.9% 1500ML

## (undated) DEVICE — BOSTON SCIENTIFC ENCORE 26 INFLATOR

## (undated) DEVICE — BASIN SET SINGLE

## (undated) DEVICE — SOL IRR POUR H2O 1500ML

## (undated) DEVICE — XI ARM FORCEP FENESTRATED BIPOLAR 8MM

## (undated) DEVICE — LONE STAR ELASTIC STAY HOOK 5MM SHARP

## (undated) DEVICE — CLAMP BX HOT RAD JAW 3

## (undated) DEVICE — SOL IRR BAG NS 0.9% 3000ML

## (undated) DEVICE — DRSG STERISTRIPS 0.5 X 4"

## (undated) DEVICE — WARMING BLANKET FULL UNDERBODY

## (undated) DEVICE — PRESSURE INFUSOR BAG 3000ML

## (undated) DEVICE — ELCTR BOVIE BLADE 3/4" EXTENDED LENGTH 6"

## (undated) DEVICE — DRSG XEROFORM 5 X 9"

## (undated) DEVICE — TUBING MEDI-VAC W MAXIGRIP CONNECTORS 1/4"X6'

## (undated) DEVICE — DRSG STOCKINETTE IMPERVIOUS LG

## (undated) DEVICE — CONTAINER FORMALIN 80ML YELLOW

## (undated) DEVICE — XI OBTURATOR OPTICAL BLADELESS 8MM

## (undated) DEVICE — FOLEY TRAY 16FR 5CC LF UMETER CLOSED

## (undated) DEVICE — SUT BIOSYN 4-0 18" P-12

## (undated) DEVICE — ELCTR GROUNDING PAD ADULT COVIDIEN

## (undated) DEVICE — POSITIONER BLUE BOLSTER

## (undated) DEVICE — PACK ROBOTIC

## (undated) DEVICE — DRAPE FLUID WARMER 44 X 66"

## (undated) DEVICE — GOWN IMPERV BREATHABLE XL

## (undated) DEVICE — DRSG TELFA 3 X 8

## (undated) DEVICE — BLADE SURGICAL #10 STAINLESS

## (undated) DEVICE — SHEARS COVIDIEN ENDO SHEAR 5MM X 31CM W UNIPOLAR CAUTERY

## (undated) DEVICE — LUBRICATING JELLY HR ONE SHOT 3G

## (undated) DEVICE — DRAPE C ARM 41X140"

## (undated) DEVICE — SYR LUER LOK 20CC

## (undated) DEVICE — MEDICATION LABELS W MARKER

## (undated) DEVICE — PACK MAJOR ABDOMINAL WITH LAP

## (undated) DEVICE — ELCTR PLASMA ROLLER 24FR 12-30 DEG

## (undated) DEVICE — LONE STAR RETRACTOR RING 12MM BLUNT DISP

## (undated) DEVICE — SUT VLOC 180 0 12" GS-21 GREEN

## (undated) DEVICE — CANNULA ANT CHMBR 27GX22MM

## (undated) DEVICE — ELCTR BOVIE PENCIL SMOKE EVACUATION

## (undated) DEVICE — SOL INJ NS 0.9% 500ML 1-PORT

## (undated) DEVICE — DRAPE MAYO STAND 23"

## (undated) DEVICE — D HELP - CLEARVIEW CLEARIFY SYSTEM

## (undated) DEVICE — OSTOMY KIT CERAPLUS 1.75" NS (GREEN)

## (undated) DEVICE — FOLEY HOLDER STATLOCK 2 WAY ADULT

## (undated) DEVICE — GLV 8 PROTEXIS (WHITE)

## (undated) DEVICE — TUBING THERMADX UROLOGY

## (undated) DEVICE — STAPLER COVIDIEN ENDO GIA STANDARD HANDLE

## (undated) DEVICE — DRSG CURITY GAUZE SPONGE 4 X 4" 12-PLY

## (undated) DEVICE — TOURNIQUET CUFF 18" DUAL PORT SINGLE BLADDER LUER LOCK (BLACK)

## (undated) DEVICE — SUT VICRYL 2-0 27" CT-1 UNDYED

## (undated) DEVICE — LIGASURE IMPACT

## (undated) DEVICE — DRAPE IOBAN 23" X 23"

## (undated) DEVICE — DRAIN JACKSON PRATT 10MM FLAT 3/4 NO TROCAR

## (undated) DEVICE — DRAPE TOWEL WHITE 17" X 24"

## (undated) DEVICE — XI ARM SCISSOR MONO CURVED

## (undated) DEVICE — TUBING IV SET GRAVITY 3Y 100" MACRO

## (undated) DEVICE — DRSG TEGADERM 6 X 8"

## (undated) DEVICE — DRAPE MAYO STAND 30"

## (undated) DEVICE — STAPLER SKIN VISI-STAT 35 WIDE

## (undated) DEVICE — LONE STAR RETRACTOR SQUARE 14.1CMX14.1CM DISP

## (undated) DEVICE — DRSG CURITY GAUZE SPONGE 4 X 4" 12-PLY NON-STERILE

## (undated) DEVICE — BIOSEL SIGMOIDOSCOPE KIT DISP

## (undated) DEVICE — DRSG 2X2

## (undated) DEVICE — DRSG OPSITE 13.75 X 4"

## (undated) DEVICE — SYR LUER LOK 3CC

## (undated) DEVICE — SUT POLYSORB 1 60" TIES

## (undated) DEVICE — MERCIAN VISABILITY BACKROUND YELLOW

## (undated) DEVICE — SUT SILK 3-0 18" SH (POP-OFF)

## (undated) DEVICE — PACK FREE FLAP

## (undated) DEVICE — SUT MONOCRYL 4-0 27" PS-2 UNDYED

## (undated) DEVICE — CATH URET RED RUB 16FR 2779

## (undated) DEVICE — LIJ-ZIMMER MESHGRAFTER: Type: DURABLE MEDICAL EQUIPMENT

## (undated) DEVICE — BIPOLAR FORCEP KIRWAN JEWELERS STR 4" X 0.4MM W 12FT CORD (GREEN)

## (undated) DEVICE — DRSG TEGADERM 2.5X3"

## (undated) DEVICE — DRSG COBAN 4"

## (undated) DEVICE — XI SEAL UNIVERSIAL 5-12MM

## (undated) DEVICE — XI ARM DISSECTOR CURVED BIPOLAR 8MM

## (undated) DEVICE — GLV 7.5 PROTEXIS (CREAM) MICRO

## (undated) DEVICE — TROCAR APPLIED MEDICAL KII BALLOON BLUNT TIP 12MM X 100MM

## (undated) DEVICE — GLV 8 PROTEXIS (CREAM) MICRO

## (undated) DEVICE — DRAPE VARI-LENS2 MICROSCPOPE 68MM

## (undated) DEVICE — DRAPE LIGHT HANDLE COVER (BLUE)